# Patient Record
Sex: FEMALE | Race: WHITE | Employment: OTHER
[De-identification: names, ages, dates, MRNs, and addresses within clinical notes are randomized per-mention and may not be internally consistent; named-entity substitution may affect disease eponyms.]

---

## 2017-01-04 ENCOUNTER — OFFICE VISIT (OUTPATIENT)
Dept: FAMILY MEDICINE CLINIC | Facility: CLINIC | Age: 67
End: 2017-01-04

## 2017-01-04 VITALS
HEIGHT: 61 IN | HEART RATE: 63 BPM | RESPIRATION RATE: 18 BRPM | WEIGHT: 244.4 LBS | SYSTOLIC BLOOD PRESSURE: 127 MMHG | DIASTOLIC BLOOD PRESSURE: 69 MMHG | BODY MASS INDEX: 46.14 KG/M2

## 2017-01-04 DIAGNOSIS — R07.89 OTHER CHEST PAIN: ICD-10-CM

## 2017-01-04 DIAGNOSIS — Z00.00 PREVENTATIVE HEALTH CARE: ICD-10-CM

## 2017-01-04 DIAGNOSIS — K21.00 GASTROESOPHAGEAL REFLUX DISEASE WITH ESOPHAGITIS: Primary | ICD-10-CM

## 2017-01-04 DIAGNOSIS — M89.9 DISORDER OF BONE: ICD-10-CM

## 2017-01-04 DIAGNOSIS — E78.5 HYPERLIPIDEMIA WITH TARGET LDL LESS THAN 100: Chronic | ICD-10-CM

## 2017-01-04 PROCEDURE — 99214 OFFICE O/P EST MOD 30 MIN: CPT | Performed by: FAMILY MEDICINE

## 2017-01-04 RX ORDER — RANITIDINE 150 MG/1
150 TABLET ORAL 2 TIMES DAILY
Qty: 180 TABLET | Refills: 3 | Status: ON HOLD | OUTPATIENT
Start: 2017-01-04 | End: 2017-07-10 | Stop reason: HOSPADM

## 2017-01-04 RX ORDER — PANTOPRAZOLE SODIUM 40 MG/1
40 TABLET, DELAYED RELEASE ORAL DAILY
Qty: 90 TABLET | Refills: 3 | Status: SHIPPED | OUTPATIENT
Start: 2017-01-04 | End: 2017-03-07 | Stop reason: SDUPTHER

## 2017-01-04 RX ORDER — PHENOL 1.4 %
10 AEROSOL, SPRAY (ML) MUCOUS MEMBRANE NIGHTLY
Qty: 90 TABLET | Refills: 3 | Status: SHIPPED | OUTPATIENT
Start: 2017-01-04 | End: 2017-10-19 | Stop reason: SDUPTHER

## 2017-02-13 ENCOUNTER — HOSPITAL ENCOUNTER (OUTPATIENT)
Dept: WOMENS IMAGING | Age: 67
Discharge: HOME | End: 2017-02-13
Payer: MEDICARE

## 2017-02-17 ENCOUNTER — HOSPITAL ENCOUNTER (OUTPATIENT)
Dept: PAIN MANAGEMENT | Age: 67
Discharge: HOME | End: 2017-02-17
Payer: MEDICARE

## 2017-02-17 VITALS
SYSTOLIC BLOOD PRESSURE: 111 MMHG | OXYGEN SATURATION: 98 % | BODY MASS INDEX: 48.1 KG/M2 | TEMPERATURE: 98.1 F | HEART RATE: 69 BPM | RESPIRATION RATE: 16 BRPM | WEIGHT: 245 LBS | HEIGHT: 60 IN | DIASTOLIC BLOOD PRESSURE: 75 MMHG

## 2017-02-17 DIAGNOSIS — M47.812 OSTEOARTHRITIS OF CERVICAL SPINE, UNSPECIFIED SPINAL OSTEOARTHRITIS COMPLICATION STATUS: ICD-10-CM

## 2017-02-17 DIAGNOSIS — M54.81 BILATERAL OCCIPITAL NEURALGIA: ICD-10-CM

## 2017-02-17 DIAGNOSIS — M54.16 LUMBAR RADICULOPATHY, CHRONIC: ICD-10-CM

## 2017-02-17 DIAGNOSIS — G43.119 INTRACTABLE MIGRAINE WITH AURA WITHOUT STATUS MIGRAINOSUS: ICD-10-CM

## 2017-02-17 DIAGNOSIS — M51.37 DEGENERATION OF LUMBAR OR LUMBOSACRAL INTERVERTEBRAL DISC: ICD-10-CM

## 2017-02-17 DIAGNOSIS — M50.30 DEGENERATIVE CERVICAL DISC: ICD-10-CM

## 2017-02-17 DIAGNOSIS — Z51.81 ENCOUNTER FOR MEDICATION MONITORING: ICD-10-CM

## 2017-02-17 DIAGNOSIS — M46.1 SACROILIITIS, NOT ELSEWHERE CLASSIFIED (HCC): ICD-10-CM

## 2017-02-17 DIAGNOSIS — M47.817 LUMBOSACRAL SPONDYLOSIS WITHOUT MYELOPATHY: ICD-10-CM

## 2017-02-17 DIAGNOSIS — G89.29 ENCOUNTER FOR CHRONIC PAIN MANAGEMENT: Primary | ICD-10-CM

## 2017-02-17 DIAGNOSIS — M47.892 OTHER OSTEOARTHRITIS OF SPINE, CERVICAL REGION: ICD-10-CM

## 2017-02-17 PROCEDURE — 99214 OFFICE O/P EST MOD 30 MIN: CPT | Performed by: PAIN MEDICINE

## 2017-02-17 PROCEDURE — 99213 OFFICE O/P EST LOW 20 MIN: CPT

## 2017-02-17 RX ORDER — OXYCODONE HYDROCHLORIDE AND ACETAMINOPHEN 5; 325 MG/1; MG/1
1 TABLET ORAL EVERY 6 HOURS PRN
Qty: 100 TABLET | Refills: 0 | Status: SHIPPED | OUTPATIENT
Start: 2017-02-18 | End: 2017-03-20 | Stop reason: SDUPTHER

## 2017-02-17 ASSESSMENT — PAIN DESCRIPTION - ONSET: ONSET: ON-GOING

## 2017-02-17 ASSESSMENT — ENCOUNTER SYMPTOMS
GASTROINTESTINAL NEGATIVE: 1
SORE THROAT: 0
WHEEZING: 1
EYES NEGATIVE: 1
COUGH: 1
BACK PAIN: 1
SCALP TENDERNESS: 1
SHORTNESS OF BREATH: 1
VOMITING: 0

## 2017-02-17 ASSESSMENT — PAIN DESCRIPTION - DESCRIPTORS: DESCRIPTORS: ACHING;CONSTANT;DULL;JABBING;NAGGING;SHARP

## 2017-02-17 ASSESSMENT — PAIN DESCRIPTION - LOCATION: LOCATION: BACK;HIP;LEG

## 2017-02-17 ASSESSMENT — PAIN DESCRIPTION - ORIENTATION: ORIENTATION: RIGHT;LEFT;LOWER

## 2017-02-17 ASSESSMENT — PAIN DESCRIPTION - PROGRESSION: CLINICAL_PROGRESSION: GRADUALLY WORSENING

## 2017-02-17 ASSESSMENT — PAIN SCALES - GENERAL: PAINLEVEL_OUTOF10: 7

## 2017-02-17 ASSESSMENT — PAIN DESCRIPTION - PAIN TYPE: TYPE: CHRONIC PAIN

## 2017-02-17 ASSESSMENT — PAIN DESCRIPTION - FREQUENCY: FREQUENCY: CONTINUOUS

## 2017-02-20 ENCOUNTER — HOSPITAL ENCOUNTER (OUTPATIENT)
Dept: WOMENS IMAGING | Age: 67
Discharge: HOME | End: 2017-02-20
Payer: MEDICARE

## 2017-02-20 DIAGNOSIS — Z00.00 PREVENTATIVE HEALTH CARE: ICD-10-CM

## 2017-02-20 DIAGNOSIS — M89.9 DISORDER OF BONE: ICD-10-CM

## 2017-02-20 PROCEDURE — 77080 DXA BONE DENSITY AXIAL: CPT | Performed by: RADIOLOGY

## 2017-02-20 PROCEDURE — 77080 DXA BONE DENSITY AXIAL: CPT

## 2017-03-06 RX ORDER — UBIDECARENONE 75 MG
CAPSULE ORAL
Qty: 90 TABLET | Refills: 0 | Status: SHIPPED | OUTPATIENT
Start: 2017-03-06 | End: 2017-06-06 | Stop reason: SDUPTHER

## 2017-03-06 RX ORDER — AMLODIPINE BESYLATE 5 MG/1
TABLET ORAL
Qty: 90 TABLET | Refills: 0 | Status: SHIPPED | OUTPATIENT
Start: 2017-03-06 | End: 2017-06-19 | Stop reason: SDUPTHER

## 2017-03-06 RX ORDER — PEN NEEDLE, DIABETIC 29 G X1/2"
NEEDLE, DISPOSABLE MISCELLANEOUS
Qty: 100 EACH | Refills: 0 | OUTPATIENT
Start: 2017-03-06

## 2017-03-07 ENCOUNTER — OFFICE VISIT (OUTPATIENT)
Dept: FAMILY MEDICINE CLINIC | Facility: CLINIC | Age: 67
End: 2017-03-07

## 2017-03-07 ENCOUNTER — TELEPHONE (OUTPATIENT)
Dept: FAMILY MEDICINE CLINIC | Facility: CLINIC | Age: 67
End: 2017-03-07

## 2017-03-07 VITALS
WEIGHT: 237 LBS | HEART RATE: 69 BPM | DIASTOLIC BLOOD PRESSURE: 77 MMHG | BODY MASS INDEX: 44.75 KG/M2 | HEIGHT: 61 IN | SYSTOLIC BLOOD PRESSURE: 136 MMHG

## 2017-03-07 DIAGNOSIS — Z79.4 TYPE 2 DIABETES MELLITUS WITHOUT COMPLICATION, WITH LONG-TERM CURRENT USE OF INSULIN (HCC): Primary | ICD-10-CM

## 2017-03-07 DIAGNOSIS — I10 HTN (HYPERTENSION), BENIGN: Chronic | ICD-10-CM

## 2017-03-07 DIAGNOSIS — J44.9 CHRONIC OBSTRUCTIVE PULMONARY DISEASE, UNSPECIFIED COPD TYPE (HCC): ICD-10-CM

## 2017-03-07 DIAGNOSIS — K21.00 GASTROESOPHAGEAL REFLUX DISEASE WITH ESOPHAGITIS: ICD-10-CM

## 2017-03-07 DIAGNOSIS — E11.9 TYPE 2 DIABETES MELLITUS WITHOUT COMPLICATION, WITH LONG-TERM CURRENT USE OF INSULIN (HCC): Primary | ICD-10-CM

## 2017-03-07 PROCEDURE — 99214 OFFICE O/P EST MOD 30 MIN: CPT | Performed by: FAMILY MEDICINE

## 2017-03-07 RX ORDER — PANTOPRAZOLE SODIUM 40 MG/1
40 TABLET, DELAYED RELEASE ORAL 2 TIMES DAILY
Qty: 180 TABLET | Refills: 3 | Status: ON HOLD | OUTPATIENT
Start: 2017-03-07 | End: 2018-02-06

## 2017-03-07 ASSESSMENT — ENCOUNTER SYMPTOMS
NAUSEA: 0
SHORTNESS OF BREATH: 0
VOMITING: 0
COUGH: 0
ABDOMINAL PAIN: 0

## 2017-03-16 RX ORDER — DIPHENHYDRAMINE HCL 25 MG
CAPSULE ORAL
Qty: 30 CAPSULE | Refills: 0 | Status: ON HOLD | OUTPATIENT
Start: 2017-03-16 | End: 2018-02-01

## 2017-03-20 ENCOUNTER — HOSPITAL ENCOUNTER (OUTPATIENT)
Dept: PAIN MANAGEMENT | Age: 67
Discharge: HOME | End: 2017-03-20
Payer: MEDICARE

## 2017-03-20 DIAGNOSIS — G43.709 CHRONIC MIGRAINE WITHOUT AURA WITHOUT STATUS MIGRAINOSUS, NOT INTRACTABLE: ICD-10-CM

## 2017-03-20 DIAGNOSIS — M47.817 LUMBOSACRAL SPONDYLOSIS WITHOUT MYELOPATHY: ICD-10-CM

## 2017-03-20 DIAGNOSIS — M54.81 BILATERAL OCCIPITAL NEURALGIA: ICD-10-CM

## 2017-03-20 DIAGNOSIS — M25.562 CHRONIC PAIN OF LEFT KNEE: ICD-10-CM

## 2017-03-20 DIAGNOSIS — M51.37 DEGENERATION OF LUMBAR OR LUMBOSACRAL INTERVERTEBRAL DISC: ICD-10-CM

## 2017-03-20 DIAGNOSIS — M46.1 SACROILIITIS, NOT ELSEWHERE CLASSIFIED (HCC): ICD-10-CM

## 2017-03-20 DIAGNOSIS — G89.29 ENCOUNTER FOR CHRONIC PAIN MANAGEMENT: Primary | ICD-10-CM

## 2017-03-20 DIAGNOSIS — G89.29 CHRONIC PAIN OF LEFT KNEE: ICD-10-CM

## 2017-03-20 DIAGNOSIS — M50.30 DEGENERATIVE CERVICAL DISC: ICD-10-CM

## 2017-03-20 DIAGNOSIS — M54.16 LUMBAR RADICULOPATHY, CHRONIC: ICD-10-CM

## 2017-03-20 DIAGNOSIS — Z51.81 ENCOUNTER FOR MEDICATION MONITORING: ICD-10-CM

## 2017-03-20 DIAGNOSIS — J44.9 CHRONIC OBSTRUCTIVE PULMONARY DISEASE, UNSPECIFIED COPD TYPE (HCC): ICD-10-CM

## 2017-03-20 PROCEDURE — 99213 OFFICE O/P EST LOW 20 MIN: CPT | Performed by: NURSE PRACTITIONER

## 2017-03-20 PROCEDURE — 99213 OFFICE O/P EST LOW 20 MIN: CPT

## 2017-03-20 RX ORDER — AMOXICILLIN 500 MG/1
TABLET, FILM COATED ORAL EVERY 8 HOURS
Status: ON HOLD | COMMUNITY
End: 2017-04-10 | Stop reason: HOSPADM

## 2017-03-20 RX ORDER — OXYCODONE HYDROCHLORIDE AND ACETAMINOPHEN 5; 325 MG/1; MG/1
1 TABLET ORAL EVERY 6 HOURS PRN
Qty: 100 TABLET | Refills: 0 | Status: SHIPPED | OUTPATIENT
Start: 2017-03-20 | End: 2017-04-19 | Stop reason: SDUPTHER

## 2017-03-20 RX ORDER — TOPIRAMATE 100 MG/1
100 TABLET, FILM COATED ORAL NIGHTLY
Qty: 90 TABLET | Refills: 1 | Status: SHIPPED | OUTPATIENT
Start: 2017-03-20 | End: 2017-06-21 | Stop reason: SDUPTHER

## 2017-03-21 ENCOUNTER — OFFICE VISIT (OUTPATIENT)
Dept: PODIATRY | Age: 67
End: 2017-03-21
Payer: MEDICARE

## 2017-03-21 VITALS
DIASTOLIC BLOOD PRESSURE: 57 MMHG | HEART RATE: 77 BPM | BODY MASS INDEX: 42.33 KG/M2 | HEIGHT: 62 IN | SYSTOLIC BLOOD PRESSURE: 105 MMHG | WEIGHT: 230 LBS

## 2017-03-21 DIAGNOSIS — B35.1 ONYCHOMYCOSIS OF TOENAIL: Primary | ICD-10-CM

## 2017-03-21 DIAGNOSIS — M79.675 PAIN OF TOES OF BOTH FEET: ICD-10-CM

## 2017-03-21 DIAGNOSIS — M79.674 PAIN OF TOES OF BOTH FEET: ICD-10-CM

## 2017-03-21 DIAGNOSIS — E11.51 TYPE 2 DIABETES MELLITUS WITH PERIPHERAL VASCULAR DISEASE (HCC): ICD-10-CM

## 2017-03-21 PROCEDURE — 11721 DEBRIDE NAIL 6 OR MORE: CPT | Performed by: PODIATRIST

## 2017-03-21 PROCEDURE — 99203 OFFICE O/P NEW LOW 30 MIN: CPT | Performed by: PODIATRIST

## 2017-03-22 ASSESSMENT — ENCOUNTER SYMPTOMS
SHORTNESS OF BREATH: 0
BACK PAIN: 0
NAUSEA: 0
COLOR CHANGE: 0
DIARRHEA: 0

## 2017-04-06 ENCOUNTER — APPOINTMENT (OUTPATIENT)
Dept: GENERAL RADIOLOGY | Age: 67
DRG: 392 | End: 2017-04-06
Payer: MEDICARE

## 2017-04-06 ENCOUNTER — HOSPITAL ENCOUNTER (OUTPATIENT)
Age: 67
Setting detail: OBSERVATION
Discharge: HOME | DRG: 392 | End: 2017-04-10
Attending: EMERGENCY MEDICINE | Admitting: INTERNAL MEDICINE
Payer: MEDICARE

## 2017-04-06 ENCOUNTER — APPOINTMENT (OUTPATIENT)
Dept: CT IMAGING | Age: 67
DRG: 392 | End: 2017-04-06
Payer: MEDICARE

## 2017-04-06 DIAGNOSIS — R91.1 NODULE OF RIGHT LUNG: Primary | ICD-10-CM

## 2017-04-06 DIAGNOSIS — R10.12 LEFT UPPER QUADRANT PAIN: ICD-10-CM

## 2017-04-06 DIAGNOSIS — R19.7 DIARRHEA, UNSPECIFIED TYPE: ICD-10-CM

## 2017-04-06 DIAGNOSIS — R11.2 NAUSEA AND VOMITING, INTRACTABILITY OF VOMITING NOT SPECIFIED, UNSPECIFIED VOMITING TYPE: ICD-10-CM

## 2017-04-06 LAB
-: ABNORMAL
ABSOLUTE BANDS #: 0.54 K/UL (ref 0–1)
ABSOLUTE EOS #: 0 K/UL (ref 0–0.4)
ABSOLUTE LYMPH #: 0.97 K/UL (ref 1–4.8)
ABSOLUTE MONO #: 0.54 K/UL (ref 0.1–1.3)
ALBUMIN SERPL-MCNC: 4.7 G/DL (ref 3.5–5.2)
ALBUMIN/GLOBULIN RATIO: NORMAL (ref 1–2.5)
ALP BLD-CCNC: 69 U/L (ref 35–104)
ALT SERPL-CCNC: 17 U/L (ref 5–33)
AMORPHOUS: ABNORMAL
ANION GAP SERPL CALCULATED.3IONS-SCNC: 16 MMOL/L (ref 9–17)
AST SERPL-CCNC: 16 U/L
BACTERIA: ABNORMAL
BANDS: 5 % (ref 0–10)
BASOPHILS # BLD: 0 % (ref 0–2)
BASOPHILS ABSOLUTE: 0 K/UL (ref 0–0.2)
BILIRUB SERPL-MCNC: 0.33 MG/DL (ref 0.3–1.2)
BILIRUBIN DIRECT: 0.1 MG/DL
BILIRUBIN URINE: ABNORMAL
BILIRUBIN, INDIRECT: 0.23 MG/DL (ref 0–1)
BUN BLDV-MCNC: 17 MG/DL (ref 8–23)
BUN/CREAT BLD: ABNORMAL (ref 9–20)
CALCIUM SERPL-MCNC: 9.4 MG/DL (ref 8.6–10.4)
CASTS UA: ABNORMAL /LPF (ref 0–2)
CHLORIDE BLD-SCNC: 102 MMOL/L (ref 98–107)
CO2: 23 MMOL/L (ref 20–31)
COLOR: YELLOW
COMMENT UA: ABNORMAL
CREAT SERPL-MCNC: 0.97 MG/DL (ref 0.5–0.9)
CRYSTALS, UA: ABNORMAL /HPF
DIFFERENTIAL TYPE: ABNORMAL
EOSINOPHILS RELATIVE PERCENT: 0 % (ref 0–4)
EPITHELIAL CELLS UA: ABNORMAL /HPF (ref 0–5)
GFR AFRICAN AMERICAN: >60 ML/MIN
GFR NON-AFRICAN AMERICAN: 57 ML/MIN
GFR SERPL CREATININE-BSD FRML MDRD: ABNORMAL ML/MIN/{1.73_M2}
GFR SERPL CREATININE-BSD FRML MDRD: ABNORMAL ML/MIN/{1.73_M2}
GLOBULIN: NORMAL G/DL (ref 1.5–3.8)
GLUCOSE BLD-MCNC: 170 MG/DL (ref 70–99)
GLUCOSE URINE: NEGATIVE
HCT VFR BLD CALC: 42.7 % (ref 36–46)
HEMOGLOBIN: 13.9 G/DL (ref 12–16)
KETONES, URINE: 15
LEUKOCYTE ESTERASE, URINE: ABNORMAL
LIPASE: 39 U/L (ref 13–60)
LYMPHOCYTES # BLD: 9 % (ref 24–44)
MCH RBC QN AUTO: 29.9 PG (ref 26–34)
MCHC RBC AUTO-ENTMCNC: 32.5 G/DL (ref 31–37)
MCV RBC AUTO: 91.9 FL (ref 80–100)
MONOCYTES # BLD: 5 % (ref 1–7)
MORPHOLOGY: NORMAL
MUCUS: ABNORMAL
NITRITE, URINE: NEGATIVE
OTHER OBSERVATIONS UA: ABNORMAL
PDW BLD-RTO: 14.3 % (ref 11.5–14.9)
PH UA: 5 (ref 5–8)
PLATELET # BLD: 170 K/UL (ref 150–450)
PLATELET ESTIMATE: ABNORMAL
PMV BLD AUTO: 9.8 FL (ref 6–12)
POTASSIUM SERPL-SCNC: 3.9 MMOL/L (ref 3.7–5.3)
PROTEIN UA: 100
RBC # BLD: 4.65 M/UL (ref 4–5.2)
RBC # BLD: ABNORMAL 10*6/UL
RBC UA: ABNORMAL /HPF
RENAL EPITHELIAL, UA: ABNORMAL /HPF
SEG NEUTROPHILS: 81 % (ref 36–66)
SEGMENTED NEUTROPHILS ABSOLUTE COUNT: 8.75 K/UL (ref 1.3–9.1)
SODIUM BLD-SCNC: 141 MMOL/L (ref 135–144)
SPECIFIC GRAVITY UA: 1.03 (ref 1–1.03)
TOTAL PROTEIN: 8 G/DL (ref 6.4–8.3)
TRICHOMONAS: ABNORMAL
TROPONIN INTERP: NORMAL
TROPONIN T: <0.03 NG/ML
TURBIDITY: ABNORMAL
URINE HGB: ABNORMAL
UROBILINOGEN, URINE: NORMAL
WBC # BLD: 10.8 K/UL (ref 3.5–11)
WBC # BLD: ABNORMAL 10*3/UL
WBC UA: ABNORMAL /HPF
YEAST: ABNORMAL

## 2017-04-06 PROCEDURE — 87493 C DIFF AMPLIFIED PROBE: CPT

## 2017-04-06 PROCEDURE — 2580000003 HC RX 258: Performed by: EMERGENCY MEDICINE

## 2017-04-06 PROCEDURE — 80048 BASIC METABOLIC PNL TOTAL CA: CPT

## 2017-04-06 PROCEDURE — 99223 1ST HOSP IP/OBS HIGH 75: CPT | Performed by: INTERNAL MEDICINE

## 2017-04-06 PROCEDURE — 81001 URINALYSIS AUTO W/SCOPE: CPT

## 2017-04-06 PROCEDURE — 84484 ASSAY OF TROPONIN QUANT: CPT

## 2017-04-06 PROCEDURE — 83690 ASSAY OF LIPASE: CPT

## 2017-04-06 PROCEDURE — G0378 HOSPITAL OBSERVATION PER HR: HCPCS

## 2017-04-06 PROCEDURE — 71020 XR CHEST STANDARD TWO VW: CPT

## 2017-04-06 PROCEDURE — 85025 COMPLETE CBC W/AUTO DIFF WBC: CPT

## 2017-04-06 PROCEDURE — 6360000002 HC RX W HCPCS: Performed by: EMERGENCY MEDICINE

## 2017-04-06 PROCEDURE — 93005 ELECTROCARDIOGRAM TRACING: CPT

## 2017-04-06 PROCEDURE — 6360000002 HC RX W HCPCS: Performed by: INTERNAL MEDICINE

## 2017-04-06 PROCEDURE — 87077 CULTURE AEROBIC IDENTIFY: CPT

## 2017-04-06 PROCEDURE — 99285 EMERGENCY DEPT VISIT HI MDM: CPT

## 2017-04-06 PROCEDURE — 96374 THER/PROPH/DIAG INJ IV PUSH: CPT

## 2017-04-06 PROCEDURE — 87086 URINE CULTURE/COLONY COUNT: CPT

## 2017-04-06 PROCEDURE — 74176 CT ABD & PELVIS W/O CONTRAST: CPT

## 2017-04-06 PROCEDURE — 36415 COLL VENOUS BLD VENIPUNCTURE: CPT

## 2017-04-06 PROCEDURE — 96375 TX/PRO/DX INJ NEW DRUG ADDON: CPT

## 2017-04-06 PROCEDURE — 87186 SC STD MICRODIL/AGAR DIL: CPT

## 2017-04-06 PROCEDURE — 80076 HEPATIC FUNCTION PANEL: CPT

## 2017-04-06 RX ORDER — CLOPIDOGREL BISULFATE 75 MG/1
75 TABLET ORAL DAILY
Status: DISCONTINUED | OUTPATIENT
Start: 2017-04-07 | End: 2017-04-10 | Stop reason: HOSPADM

## 2017-04-06 RX ORDER — FERROUS SULFATE 325(65) MG
325 TABLET ORAL
Status: DISCONTINUED | OUTPATIENT
Start: 2017-04-07 | End: 2017-04-10 | Stop reason: HOSPADM

## 2017-04-06 RX ORDER — SODIUM CHLORIDE AND POTASSIUM CHLORIDE .9; .15 G/100ML; G/100ML
SOLUTION INTRAVENOUS CONTINUOUS
Status: DISCONTINUED | OUTPATIENT
Start: 2017-04-06 | End: 2017-04-10

## 2017-04-06 RX ORDER — CARVEDILOL 3.12 MG/1
3.12 TABLET ORAL 2 TIMES DAILY
Status: DISCONTINUED | OUTPATIENT
Start: 2017-04-07 | End: 2017-04-10 | Stop reason: HOSPADM

## 2017-04-06 RX ORDER — PANTOPRAZOLE SODIUM 40 MG/1
40 TABLET, DELAYED RELEASE ORAL 2 TIMES DAILY
Status: DISCONTINUED | OUTPATIENT
Start: 2017-04-07 | End: 2017-04-10 | Stop reason: HOSPADM

## 2017-04-06 RX ORDER — ATORVASTATIN CALCIUM 80 MG/1
80 TABLET, FILM COATED ORAL NIGHTLY
Status: DISCONTINUED | OUTPATIENT
Start: 2017-04-07 | End: 2017-04-10 | Stop reason: HOSPADM

## 2017-04-06 RX ORDER — TOPIRAMATE 100 MG/1
100 TABLET, FILM COATED ORAL NIGHTLY
Status: DISCONTINUED | OUTPATIENT
Start: 2017-04-07 | End: 2017-04-10 | Stop reason: HOSPADM

## 2017-04-06 RX ORDER — MECOBALAMIN 5000 MCG
10 TABLET,DISINTEGRATING ORAL NIGHTLY
Status: DISCONTINUED | OUTPATIENT
Start: 2017-04-07 | End: 2017-04-10 | Stop reason: HOSPADM

## 2017-04-06 RX ORDER — UBIDECARENONE 75 MG
100 CAPSULE ORAL DAILY
Status: DISCONTINUED | OUTPATIENT
Start: 2017-04-07 | End: 2017-04-10 | Stop reason: HOSPADM

## 2017-04-06 RX ORDER — SODIUM CHLORIDE 0.9 % (FLUSH) 0.9 %
10 SYRINGE (ML) INJECTION PRN
Status: DISCONTINUED | OUTPATIENT
Start: 2017-04-06 | End: 2017-04-10 | Stop reason: HOSPADM

## 2017-04-06 RX ORDER — ISOSORBIDE MONONITRATE 30 MG/1
30 TABLET, EXTENDED RELEASE ORAL DAILY
Status: DISCONTINUED | OUTPATIENT
Start: 2017-04-07 | End: 2017-04-10 | Stop reason: HOSPADM

## 2017-04-06 RX ORDER — AMLODIPINE BESYLATE 5 MG/1
5 TABLET ORAL DAILY
Status: DISCONTINUED | OUTPATIENT
Start: 2017-04-07 | End: 2017-04-10 | Stop reason: HOSPADM

## 2017-04-06 RX ORDER — SODIUM CHLORIDE 0.9 % (FLUSH) 0.9 %
10 SYRINGE (ML) INJECTION EVERY 12 HOURS SCHEDULED
Status: DISCONTINUED | OUTPATIENT
Start: 2017-04-06 | End: 2017-04-10 | Stop reason: HOSPADM

## 2017-04-06 RX ORDER — MAGNESIUM OXIDE 400 MG/1
400 TABLET ORAL 2 TIMES DAILY
Status: DISCONTINUED | OUTPATIENT
Start: 2017-04-07 | End: 2017-04-07 | Stop reason: CLARIF

## 2017-04-06 RX ORDER — OXYCODONE HYDROCHLORIDE AND ACETAMINOPHEN 5; 325 MG/1; MG/1
1 TABLET ORAL EVERY 6 HOURS PRN
Status: DISCONTINUED | OUTPATIENT
Start: 2017-04-06 | End: 2017-04-10 | Stop reason: HOSPADM

## 2017-04-06 RX ORDER — FUROSEMIDE 20 MG/1
20 TABLET ORAL PRN
Status: DISCONTINUED | OUTPATIENT
Start: 2017-04-06 | End: 2017-04-10 | Stop reason: HOSPADM

## 2017-04-06 RX ORDER — 0.9 % SODIUM CHLORIDE 0.9 %
1000 INTRAVENOUS SOLUTION INTRAVENOUS ONCE
Status: COMPLETED | OUTPATIENT
Start: 2017-04-06 | End: 2017-04-06

## 2017-04-06 RX ORDER — CITALOPRAM 20 MG/1
20 TABLET ORAL 2 TIMES DAILY
Status: DISCONTINUED | OUTPATIENT
Start: 2017-04-07 | End: 2017-04-10 | Stop reason: HOSPADM

## 2017-04-06 RX ORDER — FAMOTIDINE 20 MG/1
20 TABLET, FILM COATED ORAL DAILY
Status: DISCONTINUED | OUTPATIENT
Start: 2017-04-07 | End: 2017-04-10 | Stop reason: HOSPADM

## 2017-04-06 RX ORDER — LOSARTAN POTASSIUM 25 MG/1
25 TABLET ORAL DAILY
Status: DISCONTINUED | OUTPATIENT
Start: 2017-04-07 | End: 2017-04-10 | Stop reason: HOSPADM

## 2017-04-06 RX ORDER — DIPHENHYDRAMINE HCL 25 MG
25 TABLET ORAL EVERY 6 HOURS PRN
Status: DISCONTINUED | OUTPATIENT
Start: 2017-04-06 | End: 2017-04-10 | Stop reason: HOSPADM

## 2017-04-06 RX ORDER — FENTANYL CITRATE 50 UG/ML
100 INJECTION, SOLUTION INTRAMUSCULAR; INTRAVENOUS ONCE
Status: COMPLETED | OUTPATIENT
Start: 2017-04-06 | End: 2017-04-06

## 2017-04-06 RX ORDER — ALBUTEROL SULFATE 2.5 MG/3ML
2.5 SOLUTION RESPIRATORY (INHALATION) EVERY 6 HOURS PRN
Status: DISCONTINUED | OUTPATIENT
Start: 2017-04-06 | End: 2017-04-10 | Stop reason: HOSPADM

## 2017-04-06 RX ORDER — ONDANSETRON 2 MG/ML
4 INJECTION INTRAMUSCULAR; INTRAVENOUS ONCE
Status: COMPLETED | OUTPATIENT
Start: 2017-04-06 | End: 2017-04-06

## 2017-04-06 RX ORDER — DOCUSATE SODIUM 100 MG/1
100 CAPSULE, LIQUID FILLED ORAL 2 TIMES DAILY
Status: DISCONTINUED | OUTPATIENT
Start: 2017-04-07 | End: 2017-04-10 | Stop reason: HOSPADM

## 2017-04-06 RX ADMIN — PIPERACILLIN SODIUM,TAZOBACTAM SODIUM 3.38 G: 3; .375 INJECTION, POWDER, FOR SOLUTION INTRAVENOUS at 21:52

## 2017-04-06 RX ADMIN — ONDANSETRON 4 MG: 2 INJECTION INTRAMUSCULAR; INTRAVENOUS at 20:36

## 2017-04-06 RX ADMIN — FENTANYL CITRATE 100 MCG: 50 INJECTION INTRAMUSCULAR; INTRAVENOUS at 20:36

## 2017-04-06 RX ADMIN — SODIUM CHLORIDE 1000 ML: 9 INJECTION, SOLUTION INTRAVENOUS at 20:24

## 2017-04-06 RX ADMIN — SODIUM CHLORIDE AND POTASSIUM CHLORIDE: 9; 1.49 INJECTION, SOLUTION INTRAVENOUS at 23:33

## 2017-04-06 ASSESSMENT — PAIN SCALES - GENERAL
PAINLEVEL_OUTOF10: 8
PAINLEVEL_OUTOF10: 10
PAINLEVEL_OUTOF10: 8
PAINLEVEL_OUTOF10: 0

## 2017-04-06 ASSESSMENT — ENCOUNTER SYMPTOMS
NAUSEA: 1
SHORTNESS OF BREATH: 0
VOMITING: 1
ABDOMINAL PAIN: 1
PHOTOPHOBIA: 0
COUGH: 0
SORE THROAT: 0
DIARRHEA: 1
RHINORRHEA: 0
EYE PAIN: 0
SINUS PRESSURE: 1
BLOOD IN STOOL: 0

## 2017-04-06 ASSESSMENT — PAIN DESCRIPTION - LOCATION: LOCATION: ABDOMEN;CHEST

## 2017-04-06 ASSESSMENT — PAIN DESCRIPTION - PAIN TYPE: TYPE: ACUTE PAIN

## 2017-04-07 PROBLEM — K52.9 COLITIS: Status: ACTIVE | Noted: 2017-04-07

## 2017-04-07 PROBLEM — R11.2 NAUSEA & VOMITING: Status: ACTIVE | Noted: 2017-04-07

## 2017-04-07 PROBLEM — N30.00 ACUTE CYSTITIS: Status: ACTIVE | Noted: 2017-04-07

## 2017-04-07 PROBLEM — R91.1 PULMONARY NODULE: Status: ACTIVE | Noted: 2017-04-07

## 2017-04-07 PROBLEM — K57.90 DIVERTICULOSIS: Status: ACTIVE | Noted: 2017-04-07

## 2017-04-07 LAB
ABSOLUTE EOS #: 0.1 K/UL (ref 0–0.4)
ABSOLUTE LYMPH #: 1.7 K/UL (ref 1–4.8)
ABSOLUTE MONO #: 0.6 K/UL (ref 0.1–1.3)
ALBUMIN SERPL-MCNC: 3.7 G/DL (ref 3.5–5.2)
ALBUMIN/GLOBULIN RATIO: ABNORMAL (ref 1–2.5)
ALP BLD-CCNC: 49 U/L (ref 35–104)
ALT SERPL-CCNC: 12 U/L (ref 5–33)
ANION GAP SERPL CALCULATED.3IONS-SCNC: 10 MMOL/L (ref 9–17)
AST SERPL-CCNC: 13 U/L
BASOPHILS # BLD: 0 % (ref 0–2)
BASOPHILS ABSOLUTE: 0 K/UL (ref 0–0.2)
BILIRUB SERPL-MCNC: 0.25 MG/DL (ref 0.3–1.2)
BUN BLDV-MCNC: 13 MG/DL (ref 8–23)
BUN/CREAT BLD: ABNORMAL (ref 9–20)
C DIFFICILE TOXINS, PCR: NORMAL
CALCIUM SERPL-MCNC: 8.1 MG/DL (ref 8.6–10.4)
CAMPYLOBACTER PCR: NORMAL
CHLORIDE BLD-SCNC: 103 MMOL/L (ref 98–107)
CO2: 24 MMOL/L (ref 20–31)
CREAT SERPL-MCNC: 0.99 MG/DL (ref 0.5–0.9)
DIFFERENTIAL TYPE: ABNORMAL
EOSINOPHILS RELATIVE PERCENT: 2 % (ref 0–4)
GFR AFRICAN AMERICAN: >60 ML/MIN
GFR NON-AFRICAN AMERICAN: 56 ML/MIN
GFR SERPL CREATININE-BSD FRML MDRD: ABNORMAL ML/MIN/{1.73_M2}
GFR SERPL CREATININE-BSD FRML MDRD: ABNORMAL ML/MIN/{1.73_M2}
GLUCOSE BLD-MCNC: 105 MG/DL (ref 65–105)
GLUCOSE BLD-MCNC: 137 MG/DL (ref 65–105)
GLUCOSE BLD-MCNC: 72 MG/DL (ref 65–105)
GLUCOSE BLD-MCNC: 91 MG/DL (ref 70–99)
HCT VFR BLD CALC: 34.2 % (ref 36–46)
HEMOGLOBIN: 11.1 G/DL (ref 12–16)
LYMPHOCYTES # BLD: 22 % (ref 24–44)
MAGNESIUM: 1.4 MG/DL (ref 1.6–2.6)
MCH RBC QN AUTO: 30.2 PG (ref 26–34)
MCHC RBC AUTO-ENTMCNC: 32.6 G/DL (ref 31–37)
MCV RBC AUTO: 92.6 FL (ref 80–100)
MONOCYTES # BLD: 7 % (ref 1–7)
PDW BLD-RTO: 14.7 % (ref 11.5–14.9)
PLATELET # BLD: 157 K/UL (ref 150–450)
PLATELET ESTIMATE: ABNORMAL
PMV BLD AUTO: 9.6 FL (ref 6–12)
POTASSIUM SERPL-SCNC: 4.3 MMOL/L (ref 3.7–5.3)
RBC # BLD: 3.7 M/UL (ref 4–5.2)
RBC # BLD: ABNORMAL 10*6/UL
SALMONELLA PCR: NORMAL
SEG NEUTROPHILS: 69 % (ref 36–66)
SEGMENTED NEUTROPHILS ABSOLUTE COUNT: 5.1 K/UL (ref 1.3–9.1)
SHIGATOXIN GENE PCR: NORMAL
SHIGELLA SP PCR: NORMAL
SODIUM BLD-SCNC: 137 MMOL/L (ref 135–144)
SPECIMEN DESCRIPTION: NORMAL
SPECIMEN: NORMAL
TOTAL PROTEIN: 6.4 G/DL (ref 6.4–8.3)
WBC # BLD: 7.5 K/UL (ref 3.5–11)
WBC # BLD: ABNORMAL 10*3/UL

## 2017-04-07 PROCEDURE — 99222 1ST HOSP IP/OBS MODERATE 55: CPT | Performed by: INTERNAL MEDICINE

## 2017-04-07 PROCEDURE — 6370000000 HC RX 637 (ALT 250 FOR IP): Performed by: NURSE PRACTITIONER

## 2017-04-07 PROCEDURE — 94760 N-INVAS EAR/PLS OXIMETRY 1: CPT

## 2017-04-07 PROCEDURE — 99232 SBSQ HOSP IP/OBS MODERATE 35: CPT | Performed by: INTERNAL MEDICINE

## 2017-04-07 PROCEDURE — 83735 ASSAY OF MAGNESIUM: CPT

## 2017-04-07 PROCEDURE — 80053 COMPREHEN METABOLIC PANEL: CPT

## 2017-04-07 PROCEDURE — 82947 ASSAY GLUCOSE BLOOD QUANT: CPT

## 2017-04-07 PROCEDURE — 2500000003 HC RX 250 WO HCPCS: Performed by: NURSE PRACTITIONER

## 2017-04-07 PROCEDURE — 6360000002 HC RX W HCPCS: Performed by: INTERNAL MEDICINE

## 2017-04-07 PROCEDURE — 83630 LACTOFERRIN FECAL (QUAL): CPT

## 2017-04-07 PROCEDURE — 6360000002 HC RX W HCPCS: Performed by: NURSE PRACTITIONER

## 2017-04-07 PROCEDURE — 2580000003 HC RX 258: Performed by: INTERNAL MEDICINE

## 2017-04-07 PROCEDURE — 87205 SMEAR GRAM STAIN: CPT

## 2017-04-07 PROCEDURE — G0378 HOSPITAL OBSERVATION PER HR: HCPCS

## 2017-04-07 PROCEDURE — 85025 COMPLETE CBC W/AUTO DIFF WBC: CPT

## 2017-04-07 PROCEDURE — 1200000000 HC SEMI PRIVATE

## 2017-04-07 PROCEDURE — 87329 GIARDIA AG IA: CPT

## 2017-04-07 PROCEDURE — 87505 NFCT AGENT DETECTION GI: CPT

## 2017-04-07 PROCEDURE — 36415 COLL VENOUS BLD VENIPUNCTURE: CPT

## 2017-04-07 PROCEDURE — 82272 OCCULT BLD FECES 1-3 TESTS: CPT

## 2017-04-07 RX ORDER — INSULIN GLARGINE 100 [IU]/ML
50 INJECTION, SOLUTION SUBCUTANEOUS EVERY MORNING
Status: DISCONTINUED | OUTPATIENT
Start: 2017-04-07 | End: 2017-04-10 | Stop reason: HOSPADM

## 2017-04-07 RX ORDER — DEXTROSE MONOHYDRATE 25 G/50ML
12.5 INJECTION, SOLUTION INTRAVENOUS PRN
Status: DISCONTINUED | OUTPATIENT
Start: 2017-04-07 | End: 2017-04-10 | Stop reason: HOSPADM

## 2017-04-07 RX ORDER — BISACODYL 10 MG
10 SUPPOSITORY, RECTAL RECTAL DAILY PRN
Status: DISCONTINUED | OUTPATIENT
Start: 2017-04-07 | End: 2017-04-10 | Stop reason: HOSPADM

## 2017-04-07 RX ORDER — POTASSIUM CHLORIDE 7.45 MG/ML
10 INJECTION INTRAVENOUS PRN
Status: DISCONTINUED | OUTPATIENT
Start: 2017-04-07 | End: 2017-04-10 | Stop reason: HOSPADM

## 2017-04-07 RX ORDER — INSULIN GLARGINE 100 [IU]/ML
40 INJECTION, SOLUTION SUBCUTANEOUS NIGHTLY
Status: DISCONTINUED | OUTPATIENT
Start: 2017-04-07 | End: 2017-04-10 | Stop reason: HOSPADM

## 2017-04-07 RX ORDER — POTASSIUM CHLORIDE 20 MEQ/1
40 TABLET, EXTENDED RELEASE ORAL PRN
Status: DISCONTINUED | OUTPATIENT
Start: 2017-04-07 | End: 2017-04-10 | Stop reason: HOSPADM

## 2017-04-07 RX ORDER — DEXTROSE MONOHYDRATE 50 MG/ML
100 INJECTION, SOLUTION INTRAVENOUS PRN
Status: DISCONTINUED | OUTPATIENT
Start: 2017-04-07 | End: 2017-04-10 | Stop reason: HOSPADM

## 2017-04-07 RX ORDER — NICOTINE POLACRILEX 4 MG
15 LOZENGE BUCCAL PRN
Status: DISCONTINUED | OUTPATIENT
Start: 2017-04-07 | End: 2017-04-10 | Stop reason: HOSPADM

## 2017-04-07 RX ORDER — POTASSIUM CHLORIDE 20MEQ/15ML
40 LIQUID (ML) ORAL PRN
Status: DISCONTINUED | OUTPATIENT
Start: 2017-04-07 | End: 2017-04-10 | Stop reason: HOSPADM

## 2017-04-07 RX ADMIN — OXYCODONE HYDROCHLORIDE AND ACETAMINOPHEN 1 TABLET: 5; 325 TABLET ORAL at 14:31

## 2017-04-07 RX ADMIN — Medication 400 MG: at 22:11

## 2017-04-07 RX ADMIN — PANTOPRAZOLE SODIUM 40 MG: 40 TABLET, DELAYED RELEASE ORAL at 09:05

## 2017-04-07 RX ADMIN — VITAMIN B12 0.1 MG ORAL TABLET 100 MCG: 0.1 TABLET ORAL at 09:05

## 2017-04-07 RX ADMIN — Medication 325 MG: at 09:05

## 2017-04-07 RX ADMIN — MOMETASONE FUROATE AND FORMOTEROL FUMARATE DIHYDRATE 2 PUFF: 200; 5 AEROSOL RESPIRATORY (INHALATION) at 01:38

## 2017-04-07 RX ADMIN — PANTOPRAZOLE SODIUM 40 MG: 40 TABLET, DELAYED RELEASE ORAL at 17:19

## 2017-04-07 RX ADMIN — PIPERACILLIN SODIUM,TAZOBACTAM SODIUM 3.38 G: 3; .375 INJECTION, POWDER, FOR SOLUTION INTRAVENOUS at 22:10

## 2017-04-07 RX ADMIN — Medication 400 MG: at 09:05

## 2017-04-07 RX ADMIN — CLOPIDOGREL BISULFATE 75 MG: 75 TABLET ORAL at 09:05

## 2017-04-07 RX ADMIN — OXYCODONE HYDROCHLORIDE AND ACETAMINOPHEN 1 TABLET: 5; 325 TABLET ORAL at 04:10

## 2017-04-07 RX ADMIN — Medication 50 UNITS: at 09:06

## 2017-04-07 RX ADMIN — MOMETASONE FUROATE AND FORMOTEROL FUMARATE DIHYDRATE 2 PUFF: 200; 5 AEROSOL RESPIRATORY (INHALATION) at 09:05

## 2017-04-07 RX ADMIN — MOMETASONE FUROATE AND FORMOTEROL FUMARATE DIHYDRATE 2 PUFF: 200; 5 AEROSOL RESPIRATORY (INHALATION) at 22:12

## 2017-04-07 RX ADMIN — CARVEDILOL 3.12 MG: 3.12 TABLET, FILM COATED ORAL at 22:11

## 2017-04-07 RX ADMIN — ISOSORBIDE MONONITRATE 30 MG: 30 TABLET, EXTENDED RELEASE ORAL at 09:05

## 2017-04-07 RX ADMIN — Medication 10 MG: at 22:10

## 2017-04-07 RX ADMIN — CARVEDILOL 3.12 MG: 3.12 TABLET, FILM COATED ORAL at 09:05

## 2017-04-07 RX ADMIN — TOPIRAMATE 100 MG: 100 TABLET, FILM COATED ORAL at 22:10

## 2017-04-07 RX ADMIN — ENOXAPARIN SODIUM 40 MG: 40 INJECTION SUBCUTANEOUS at 09:06

## 2017-04-07 RX ADMIN — DOCUSATE SODIUM 100 MG: 100 CAPSULE, LIQUID FILLED ORAL at 00:21

## 2017-04-07 RX ADMIN — PIPERACILLIN SODIUM,TAZOBACTAM SODIUM 3.38 G: 3; .375 INJECTION, POWDER, FOR SOLUTION INTRAVENOUS at 03:59

## 2017-04-07 RX ADMIN — Medication 10 MG: at 01:17

## 2017-04-07 RX ADMIN — CITALOPRAM HYDROBROMIDE 20 MG: 20 TABLET ORAL at 00:21

## 2017-04-07 RX ADMIN — MICONAZOLE NITRATE: 1.42 POWDER TOPICAL at 00:16

## 2017-04-07 RX ADMIN — LOSARTAN POTASSIUM 25 MG: 25 TABLET ORAL at 14:36

## 2017-04-07 RX ADMIN — FAMOTIDINE 20 MG: 20 TABLET, FILM COATED ORAL at 09:05

## 2017-04-07 RX ADMIN — CITALOPRAM HYDROBROMIDE 20 MG: 20 TABLET ORAL at 22:11

## 2017-04-07 RX ADMIN — OXYCODONE HYDROCHLORIDE AND ACETAMINOPHEN 1 TABLET: 5; 325 TABLET ORAL at 22:11

## 2017-04-07 RX ADMIN — METFORMIN HYDROCHLORIDE 1000 MG: 1000 TABLET, FILM COATED ORAL at 09:05

## 2017-04-07 RX ADMIN — PIPERACILLIN SODIUM,TAZOBACTAM SODIUM 3.38 G: 3; .375 INJECTION, POWDER, FOR SOLUTION INTRAVENOUS at 14:35

## 2017-04-07 RX ADMIN — TOPIRAMATE 100 MG: 100 TABLET, FILM COATED ORAL at 01:16

## 2017-04-07 RX ADMIN — CITALOPRAM HYDROBROMIDE 20 MG: 20 TABLET ORAL at 11:26

## 2017-04-07 RX ADMIN — ATORVASTATIN CALCIUM 80 MG: 80 TABLET, FILM COATED ORAL at 22:11

## 2017-04-07 RX ADMIN — DIPHENHYDRAMINE HCL 25 MG: 25 TABLET ORAL at 00:22

## 2017-04-07 RX ADMIN — MICONAZOLE NITRATE: 1.42 POWDER TOPICAL at 09:05

## 2017-04-07 RX ADMIN — METFORMIN HYDROCHLORIDE 1000 MG: 1000 TABLET, FILM COATED ORAL at 17:19

## 2017-04-07 RX ADMIN — SODIUM CHLORIDE AND POTASSIUM CHLORIDE: 9; 1.49 INJECTION, SOLUTION INTRAVENOUS at 08:12

## 2017-04-07 RX ADMIN — AMLODIPINE BESYLATE 5 MG: 5 TABLET ORAL at 09:05

## 2017-04-07 ASSESSMENT — PAIN SCALES - GENERAL
PAINLEVEL_OUTOF10: 0
PAINLEVEL_OUTOF10: 6
PAINLEVEL_OUTOF10: 3
PAINLEVEL_OUTOF10: 9
PAINLEVEL_OUTOF10: 6
PAINLEVEL_OUTOF10: 8

## 2017-04-07 ASSESSMENT — ENCOUNTER SYMPTOMS
DIARRHEA: 1
SPUTUM PRODUCTION: 0
VOMITING: 1
DOUBLE VISION: 0
COUGH: 0
CONSTIPATION: 0
SORE THROAT: 0
ABDOMINAL PAIN: 1
BLURRED VISION: 0
BACK PAIN: 0
NAUSEA: 1
SHORTNESS OF BREATH: 0
WHEEZING: 0

## 2017-04-08 ENCOUNTER — APPOINTMENT (OUTPATIENT)
Dept: GENERAL RADIOLOGY | Age: 67
DRG: 392 | End: 2017-04-08
Payer: MEDICARE

## 2017-04-08 LAB
ABSOLUTE EOS #: 0.2 K/UL (ref 0–0.4)
ABSOLUTE LYMPH #: 2.5 K/UL (ref 1–4.8)
ABSOLUTE MONO #: 0.8 K/UL (ref 0.1–1.3)
ALBUMIN SERPL-MCNC: 3.5 G/DL (ref 3.5–5.2)
ALBUMIN/GLOBULIN RATIO: ABNORMAL (ref 1–2.5)
ALP BLD-CCNC: 52 U/L (ref 35–104)
ALT SERPL-CCNC: 12 U/L (ref 5–33)
ANION GAP SERPL CALCULATED.3IONS-SCNC: 12 MMOL/L (ref 9–17)
AST SERPL-CCNC: 12 U/L
BASOPHILS # BLD: 1 % (ref 0–2)
BASOPHILS ABSOLUTE: 0 K/UL (ref 0–0.2)
BILIRUB SERPL-MCNC: 0.26 MG/DL (ref 0.3–1.2)
BUN BLDV-MCNC: 11 MG/DL (ref 8–23)
BUN/CREAT BLD: ABNORMAL (ref 9–20)
CALCIUM SERPL-MCNC: 8.4 MG/DL (ref 8.6–10.4)
CHLORIDE BLD-SCNC: 105 MMOL/L (ref 98–107)
CO2: 22 MMOL/L (ref 20–31)
CREAT SERPL-MCNC: 0.93 MG/DL (ref 0.5–0.9)
CULTURE: ABNORMAL
CULTURE: ABNORMAL
DIFFERENTIAL TYPE: ABNORMAL
EOSINOPHILS RELATIVE PERCENT: 3 % (ref 0–4)
GFR AFRICAN AMERICAN: >60 ML/MIN
GFR NON-AFRICAN AMERICAN: >60 ML/MIN
GFR SERPL CREATININE-BSD FRML MDRD: ABNORMAL ML/MIN/{1.73_M2}
GFR SERPL CREATININE-BSD FRML MDRD: ABNORMAL ML/MIN/{1.73_M2}
GLUCOSE BLD-MCNC: 136 MG/DL (ref 65–105)
GLUCOSE BLD-MCNC: 148 MG/DL (ref 65–105)
GLUCOSE BLD-MCNC: 177 MG/DL (ref 70–99)
GLUCOSE BLD-MCNC: 188 MG/DL (ref 65–105)
HCT VFR BLD CALC: 35.1 % (ref 36–46)
HEMOGLOBIN: 11.3 G/DL (ref 12–16)
LYMPHOCYTES # BLD: 33 % (ref 24–44)
Lab: ABNORMAL
MCH RBC QN AUTO: 30.2 PG (ref 26–34)
MCHC RBC AUTO-ENTMCNC: 32.2 G/DL (ref 31–37)
MCV RBC AUTO: 93.8 FL (ref 80–100)
MONOCYTES # BLD: 10 % (ref 1–7)
ORGANISM: ABNORMAL
PDW BLD-RTO: 14.3 % (ref 11.5–14.9)
PLATELET # BLD: 153 K/UL (ref 150–450)
PLATELET ESTIMATE: ABNORMAL
PMV BLD AUTO: 9.9 FL (ref 6–12)
POTASSIUM SERPL-SCNC: 4.5 MMOL/L (ref 3.7–5.3)
RBC # BLD: 3.75 M/UL (ref 4–5.2)
RBC # BLD: ABNORMAL 10*6/UL
SEG NEUTROPHILS: 53 % (ref 36–66)
SEGMENTED NEUTROPHILS ABSOLUTE COUNT: 4.1 K/UL (ref 1.3–9.1)
SODIUM BLD-SCNC: 139 MMOL/L (ref 135–144)
SPECIMEN DESCRIPTION: ABNORMAL
SPECIMEN DESCRIPTION: ABNORMAL
STATUS: ABNORMAL
TOTAL PROTEIN: 6.3 G/DL (ref 6.4–8.3)
WBC # BLD: 7.8 K/UL (ref 3.5–11)
WBC # BLD: ABNORMAL 10*3/UL

## 2017-04-08 PROCEDURE — 2580000003 HC RX 258: Performed by: INTERNAL MEDICINE

## 2017-04-08 PROCEDURE — 74000 XR ABDOMEN LIMITED (KUB): CPT

## 2017-04-08 PROCEDURE — 6360000002 HC RX W HCPCS: Performed by: NURSE PRACTITIONER

## 2017-04-08 PROCEDURE — 80053 COMPREHEN METABOLIC PANEL: CPT

## 2017-04-08 PROCEDURE — 85025 COMPLETE CBC W/AUTO DIFF WBC: CPT

## 2017-04-08 PROCEDURE — 6360000002 HC RX W HCPCS: Performed by: INTERNAL MEDICINE

## 2017-04-08 PROCEDURE — 6370000000 HC RX 637 (ALT 250 FOR IP): Performed by: NURSE PRACTITIONER

## 2017-04-08 PROCEDURE — 36415 COLL VENOUS BLD VENIPUNCTURE: CPT

## 2017-04-08 PROCEDURE — 82947 ASSAY GLUCOSE BLOOD QUANT: CPT

## 2017-04-08 PROCEDURE — 1200000000 HC SEMI PRIVATE

## 2017-04-08 PROCEDURE — 99232 SBSQ HOSP IP/OBS MODERATE 35: CPT | Performed by: INTERNAL MEDICINE

## 2017-04-08 PROCEDURE — G0378 HOSPITAL OBSERVATION PER HR: HCPCS

## 2017-04-08 RX ORDER — ONDANSETRON 2 MG/ML
4 INJECTION INTRAMUSCULAR; INTRAVENOUS EVERY 6 HOURS PRN
Status: DISCONTINUED | OUTPATIENT
Start: 2017-04-08 | End: 2017-04-10 | Stop reason: HOSPADM

## 2017-04-08 RX ADMIN — ISOSORBIDE MONONITRATE 30 MG: 30 TABLET, EXTENDED RELEASE ORAL at 07:44

## 2017-04-08 RX ADMIN — SODIUM CHLORIDE AND POTASSIUM CHLORIDE: 9; 1.49 INJECTION, SOLUTION INTRAVENOUS at 22:35

## 2017-04-08 RX ADMIN — ATORVASTATIN CALCIUM 80 MG: 80 TABLET, FILM COATED ORAL at 20:31

## 2017-04-08 RX ADMIN — Medication 50 UNITS: at 12:47

## 2017-04-08 RX ADMIN — Medication 10 MG: at 20:30

## 2017-04-08 RX ADMIN — Medication 400 MG: at 07:44

## 2017-04-08 RX ADMIN — OXYCODONE HYDROCHLORIDE AND ACETAMINOPHEN 1 TABLET: 5; 325 TABLET ORAL at 12:47

## 2017-04-08 RX ADMIN — INSULIN LISPRO 2 UNITS: 100 INJECTION, SOLUTION INTRAVENOUS; SUBCUTANEOUS at 17:06

## 2017-04-08 RX ADMIN — MICONAZOLE NITRATE: 1.42 POWDER TOPICAL at 12:52

## 2017-04-08 RX ADMIN — INSULIN GLARGINE 40 UNITS: 100 INJECTION, SOLUTION SUBCUTANEOUS at 20:39

## 2017-04-08 RX ADMIN — MOMETASONE FUROATE AND FORMOTEROL FUMARATE DIHYDRATE 2 PUFF: 200; 5 AEROSOL RESPIRATORY (INHALATION) at 07:43

## 2017-04-08 RX ADMIN — MOMETASONE FUROATE AND FORMOTEROL FUMARATE DIHYDRATE 2 PUFF: 200; 5 AEROSOL RESPIRATORY (INHALATION) at 20:28

## 2017-04-08 RX ADMIN — ONDANSETRON 4 MG: 2 INJECTION INTRAMUSCULAR; INTRAVENOUS at 17:03

## 2017-04-08 RX ADMIN — PANTOPRAZOLE SODIUM 40 MG: 40 TABLET, DELAYED RELEASE ORAL at 07:44

## 2017-04-08 RX ADMIN — Medication 325 MG: at 07:44

## 2017-04-08 RX ADMIN — ENOXAPARIN SODIUM 40 MG: 40 INJECTION SUBCUTANEOUS at 07:43

## 2017-04-08 RX ADMIN — FAMOTIDINE 20 MG: 20 TABLET, FILM COATED ORAL at 07:44

## 2017-04-08 RX ADMIN — INSULIN LISPRO 2 UNITS: 100 INJECTION, SOLUTION INTRAVENOUS; SUBCUTANEOUS at 12:46

## 2017-04-08 RX ADMIN — PANTOPRAZOLE SODIUM 40 MG: 40 TABLET, DELAYED RELEASE ORAL at 17:03

## 2017-04-08 RX ADMIN — MICONAZOLE NITRATE: 1.42 POWDER TOPICAL at 20:45

## 2017-04-08 RX ADMIN — METFORMIN HYDROCHLORIDE 1000 MG: 1000 TABLET, FILM COATED ORAL at 17:03

## 2017-04-08 RX ADMIN — PIPERACILLIN SODIUM,TAZOBACTAM SODIUM 3.38 G: 3; .375 INJECTION, POWDER, FOR SOLUTION INTRAVENOUS at 22:37

## 2017-04-08 RX ADMIN — VITAMIN B12 0.1 MG ORAL TABLET 100 MCG: 0.1 TABLET ORAL at 11:56

## 2017-04-08 RX ADMIN — METFORMIN HYDROCHLORIDE 1000 MG: 1000 TABLET, FILM COATED ORAL at 07:44

## 2017-04-08 RX ADMIN — LOSARTAN POTASSIUM 25 MG: 25 TABLET ORAL at 08:41

## 2017-04-08 RX ADMIN — AMLODIPINE BESYLATE 5 MG: 5 TABLET ORAL at 07:44

## 2017-04-08 RX ADMIN — Medication 400 MG: at 20:31

## 2017-04-08 RX ADMIN — PIPERACILLIN SODIUM,TAZOBACTAM SODIUM 3.38 G: 3; .375 INJECTION, POWDER, FOR SOLUTION INTRAVENOUS at 15:15

## 2017-04-08 RX ADMIN — DOCUSATE SODIUM 100 MG: 100 CAPSULE, LIQUID FILLED ORAL at 07:44

## 2017-04-08 RX ADMIN — CITALOPRAM HYDROBROMIDE 20 MG: 20 TABLET ORAL at 07:44

## 2017-04-08 RX ADMIN — CLOPIDOGREL BISULFATE 75 MG: 75 TABLET ORAL at 07:44

## 2017-04-08 RX ADMIN — TOPIRAMATE 100 MG: 100 TABLET, FILM COATED ORAL at 20:32

## 2017-04-08 RX ADMIN — CARVEDILOL 3.12 MG: 3.12 TABLET, FILM COATED ORAL at 07:44

## 2017-04-08 RX ADMIN — CITALOPRAM HYDROBROMIDE 20 MG: 20 TABLET ORAL at 20:32

## 2017-04-08 RX ADMIN — PIPERACILLIN SODIUM,TAZOBACTAM SODIUM 3.38 G: 3; .375 INJECTION, POWDER, FOR SOLUTION INTRAVENOUS at 06:19

## 2017-04-08 ASSESSMENT — PAIN SCALES - GENERAL
PAINLEVEL_OUTOF10: 0
PAINLEVEL_OUTOF10: 0
PAINLEVEL_OUTOF10: 7
PAINLEVEL_OUTOF10: 8

## 2017-04-08 ASSESSMENT — PAIN DESCRIPTION - ONSET: ONSET: ON-GOING

## 2017-04-08 ASSESSMENT — PAIN DESCRIPTION - PAIN TYPE: TYPE: ACUTE PAIN

## 2017-04-08 ASSESSMENT — PAIN DESCRIPTION - ORIENTATION: ORIENTATION: UPPER

## 2017-04-08 ASSESSMENT — PAIN DESCRIPTION - DESCRIPTORS: DESCRIPTORS: ACHING;SHARP

## 2017-04-08 ASSESSMENT — PAIN DESCRIPTION - LOCATION: LOCATION: ABDOMEN;BACK

## 2017-04-09 LAB
GLUCOSE BLD-MCNC: 110 MG/DL (ref 65–105)
GLUCOSE BLD-MCNC: 129 MG/DL (ref 65–105)
GLUCOSE BLD-MCNC: 86 MG/DL (ref 65–105)
GLUCOSE BLD-MCNC: 90 MG/DL (ref 65–105)
GLUCOSE BLD-MCNC: 95 MG/DL (ref 65–105)

## 2017-04-09 PROCEDURE — G0378 HOSPITAL OBSERVATION PER HR: HCPCS

## 2017-04-09 PROCEDURE — 6360000002 HC RX W HCPCS: Performed by: INTERNAL MEDICINE

## 2017-04-09 PROCEDURE — 99232 SBSQ HOSP IP/OBS MODERATE 35: CPT | Performed by: INTERNAL MEDICINE

## 2017-04-09 PROCEDURE — 82947 ASSAY GLUCOSE BLOOD QUANT: CPT

## 2017-04-09 PROCEDURE — 6360000002 HC RX W HCPCS: Performed by: NURSE PRACTITIONER

## 2017-04-09 PROCEDURE — 6370000000 HC RX 637 (ALT 250 FOR IP): Performed by: NURSE PRACTITIONER

## 2017-04-09 PROCEDURE — 1200000000 HC SEMI PRIVATE

## 2017-04-09 PROCEDURE — 2580000003 HC RX 258: Performed by: INTERNAL MEDICINE

## 2017-04-09 PROCEDURE — 6370000000 HC RX 637 (ALT 250 FOR IP): Performed by: INTERNAL MEDICINE

## 2017-04-09 RX ORDER — POLYETHYLENE GLYCOL 3350 17 G/17G
250 POWDER, FOR SOLUTION ORAL ONCE
Status: COMPLETED | OUTPATIENT
Start: 2017-04-09 | End: 2017-04-09

## 2017-04-09 RX ADMIN — POLYETHYLENE GLYCOL 3350 250 G: 17 POWDER, FOR SOLUTION ORAL at 15:33

## 2017-04-09 RX ADMIN — Medication 400 MG: at 07:42

## 2017-04-09 RX ADMIN — CARVEDILOL 3.12 MG: 3.12 TABLET, FILM COATED ORAL at 21:23

## 2017-04-09 RX ADMIN — METFORMIN HYDROCHLORIDE 1000 MG: 1000 TABLET, FILM COATED ORAL at 07:43

## 2017-04-09 RX ADMIN — DOCUSATE SODIUM 100 MG: 100 CAPSULE, LIQUID FILLED ORAL at 07:42

## 2017-04-09 RX ADMIN — PANTOPRAZOLE SODIUM 40 MG: 40 TABLET, DELAYED RELEASE ORAL at 07:42

## 2017-04-09 RX ADMIN — POLYETHYLENE GLYCOL 3350 250 G: 17 POWDER, FOR SOLUTION ORAL at 17:37

## 2017-04-09 RX ADMIN — DOCUSATE SODIUM 100 MG: 100 CAPSULE, LIQUID FILLED ORAL at 21:23

## 2017-04-09 RX ADMIN — MOMETASONE FUROATE AND FORMOTEROL FUMARATE DIHYDRATE 2 PUFF: 200; 5 AEROSOL RESPIRATORY (INHALATION) at 07:43

## 2017-04-09 RX ADMIN — FAMOTIDINE 20 MG: 20 TABLET, FILM COATED ORAL at 07:43

## 2017-04-09 RX ADMIN — CITALOPRAM HYDROBROMIDE 20 MG: 20 TABLET ORAL at 21:23

## 2017-04-09 RX ADMIN — Medication 400 MG: at 21:23

## 2017-04-09 RX ADMIN — Medication 50 UNITS: at 08:30

## 2017-04-09 RX ADMIN — PANTOPRAZOLE SODIUM 40 MG: 40 TABLET, DELAYED RELEASE ORAL at 17:41

## 2017-04-09 RX ADMIN — MICONAZOLE NITRATE: 1.42 POWDER TOPICAL at 21:24

## 2017-04-09 RX ADMIN — CARVEDILOL 3.12 MG: 3.12 TABLET, FILM COATED ORAL at 07:42

## 2017-04-09 RX ADMIN — ONDANSETRON 4 MG: 2 INJECTION INTRAMUSCULAR; INTRAVENOUS at 21:22

## 2017-04-09 RX ADMIN — VITAMIN B12 0.1 MG ORAL TABLET 100 MCG: 0.1 TABLET ORAL at 07:41

## 2017-04-09 RX ADMIN — ENOXAPARIN SODIUM 40 MG: 40 INJECTION SUBCUTANEOUS at 07:41

## 2017-04-09 RX ADMIN — MAGESIUM CITRATE 296 ML: 1.75 LIQUID ORAL at 15:58

## 2017-04-09 RX ADMIN — CEFTRIAXONE SODIUM 1 G: 1 INJECTION, POWDER, FOR SOLUTION INTRAMUSCULAR; INTRAVENOUS at 16:03

## 2017-04-09 RX ADMIN — MICONAZOLE NITRATE: 1.42 POWDER TOPICAL at 07:43

## 2017-04-09 RX ADMIN — ISOSORBIDE MONONITRATE 30 MG: 30 TABLET, EXTENDED RELEASE ORAL at 07:43

## 2017-04-09 RX ADMIN — CITALOPRAM HYDROBROMIDE 20 MG: 20 TABLET ORAL at 07:42

## 2017-04-09 RX ADMIN — AMLODIPINE BESYLATE 5 MG: 5 TABLET ORAL at 07:43

## 2017-04-09 RX ADMIN — BISACODYL 20 MG: 5 TABLET, COATED ORAL at 15:33

## 2017-04-09 RX ADMIN — TOPIRAMATE 100 MG: 100 TABLET, FILM COATED ORAL at 21:23

## 2017-04-09 RX ADMIN — ATORVASTATIN CALCIUM 80 MG: 80 TABLET, FILM COATED ORAL at 21:23

## 2017-04-09 RX ADMIN — LOSARTAN POTASSIUM 25 MG: 25 TABLET ORAL at 07:42

## 2017-04-09 RX ADMIN — CLOPIDOGREL BISULFATE 75 MG: 75 TABLET ORAL at 07:43

## 2017-04-09 RX ADMIN — Medication 325 MG: at 07:43

## 2017-04-09 RX ADMIN — SODIUM CHLORIDE AND POTASSIUM CHLORIDE: 9; 1.49 INJECTION, SOLUTION INTRAVENOUS at 21:37

## 2017-04-09 RX ADMIN — OXYCODONE HYDROCHLORIDE AND ACETAMINOPHEN 1 TABLET: 5; 325 TABLET ORAL at 21:23

## 2017-04-09 RX ADMIN — PIPERACILLIN SODIUM,TAZOBACTAM SODIUM 3.38 G: 3; .375 INJECTION, POWDER, FOR SOLUTION INTRAVENOUS at 06:24

## 2017-04-09 RX ADMIN — Medication 10 MG: at 21:23

## 2017-04-09 RX ADMIN — MOMETASONE FUROATE AND FORMOTEROL FUMARATE DIHYDRATE 2 PUFF: 200; 5 AEROSOL RESPIRATORY (INHALATION) at 21:22

## 2017-04-09 RX ADMIN — DIPHENHYDRAMINE HCL 25 MG: 25 TABLET ORAL at 21:29

## 2017-04-09 ASSESSMENT — PAIN SCALES - GENERAL
PAINLEVEL_OUTOF10: 3
PAINLEVEL_OUTOF10: 6
PAINLEVEL_OUTOF10: 5
PAINLEVEL_OUTOF10: 6

## 2017-04-09 ASSESSMENT — PAIN DESCRIPTION - PAIN TYPE
TYPE: ACUTE PAIN

## 2017-04-09 ASSESSMENT — PAIN DESCRIPTION - LOCATION
LOCATION: ABDOMEN

## 2017-04-09 ASSESSMENT — PAIN DESCRIPTION - FREQUENCY
FREQUENCY: CONTINUOUS
FREQUENCY: CONTINUOUS

## 2017-04-09 ASSESSMENT — PAIN DESCRIPTION - ORIENTATION
ORIENTATION: LEFT;UPPER
ORIENTATION: LEFT;UPPER
ORIENTATION: LOWER

## 2017-04-09 ASSESSMENT — PAIN DESCRIPTION - DESCRIPTORS
DESCRIPTORS: ACHING

## 2017-04-09 ASSESSMENT — PAIN DESCRIPTION - ONSET
ONSET: ON-GOING
ONSET: ON-GOING

## 2017-04-10 VITALS
WEIGHT: 249.12 LBS | BODY MASS INDEX: 45.84 KG/M2 | DIASTOLIC BLOOD PRESSURE: 64 MMHG | TEMPERATURE: 97.7 F | RESPIRATION RATE: 16 BRPM | SYSTOLIC BLOOD PRESSURE: 110 MMHG | HEIGHT: 62 IN | HEART RATE: 62 BPM | OXYGEN SATURATION: 98 %

## 2017-04-10 LAB
DATE, STOOL #1: ABNORMAL
DATE, STOOL #2: ABNORMAL
DATE, STOOL #3: ABNORMAL
DIRECT EXAM: NORMAL
GLUCOSE BLD-MCNC: 107 MG/DL (ref 65–105)
GLUCOSE BLD-MCNC: 182 MG/DL (ref 65–105)
GLUCOSE BLD-MCNC: 200 MG/DL (ref 65–105)
GLUCOSE BLD-MCNC: 40 MG/DL (ref 65–105)
GLUCOSE BLD-MCNC: 81 MG/DL (ref 65–105)
GLUCOSE BLD-MCNC: 86 MG/DL (ref 65–105)
GLUCOSE BLD-MCNC: 87 MG/DL (ref 65–105)
HEMOCCULT SP1 STL QL: POSITIVE
HEMOCCULT SP2 STL QL: ABNORMAL
HEMOCCULT SP3 STL QL: ABNORMAL
LACTOFERRIN, QUAL: ABNORMAL
Lab: NORMAL
Lab: NORMAL
SPECIMEN DESCRIPTION: NORMAL
STATUS: NORMAL
STATUS: NORMAL
TIME, STOOL #1: 1815
TIME, STOOL #2: ABNORMAL
TIME, STOOL #3: ABNORMAL

## 2017-04-10 PROCEDURE — 1200000000 HC SEMI PRIVATE

## 2017-04-10 PROCEDURE — 99239 HOSP IP/OBS DSCHRG MGMT >30: CPT | Performed by: INTERNAL MEDICINE

## 2017-04-10 PROCEDURE — G0378 HOSPITAL OBSERVATION PER HR: HCPCS

## 2017-04-10 PROCEDURE — 6370000000 HC RX 637 (ALT 250 FOR IP): Performed by: NURSE PRACTITIONER

## 2017-04-10 PROCEDURE — 45385 COLONOSCOPY W/LESION REMOVAL: CPT | Performed by: INTERNAL MEDICINE

## 2017-04-10 PROCEDURE — 99152 MOD SED SAME PHYS/QHP 5/>YRS: CPT | Performed by: INTERNAL MEDICINE

## 2017-04-10 PROCEDURE — 99153 MOD SED SAME PHYS/QHP EA: CPT | Performed by: INTERNAL MEDICINE

## 2017-04-10 PROCEDURE — 3609010400 HC COLONOSCOPY POLYPECTOMY HOT BIOPSY: Performed by: INTERNAL MEDICINE

## 2017-04-10 PROCEDURE — 7100000010 HC PHASE II RECOVERY - FIRST 15 MIN: Performed by: INTERNAL MEDICINE

## 2017-04-10 PROCEDURE — 88305 TISSUE EXAM BY PATHOLOGIST: CPT

## 2017-04-10 PROCEDURE — 82947 ASSAY GLUCOSE BLOOD QUANT: CPT

## 2017-04-10 PROCEDURE — 7100000011 HC PHASE II RECOVERY - ADDTL 15 MIN: Performed by: INTERNAL MEDICINE

## 2017-04-10 PROCEDURE — 6360000002 HC RX W HCPCS: Performed by: INTERNAL MEDICINE

## 2017-04-10 PROCEDURE — 2580000003 HC RX 258: Performed by: NURSE PRACTITIONER

## 2017-04-10 RX ORDER — MEPERIDINE HYDROCHLORIDE 50 MG/ML
INJECTION INTRAMUSCULAR; INTRAVENOUS; SUBCUTANEOUS PRN
Status: DISCONTINUED | OUTPATIENT
Start: 2017-04-10 | End: 2017-04-10 | Stop reason: HOSPADM

## 2017-04-10 RX ORDER — SODIUM CHLORIDE AND POTASSIUM CHLORIDE .9; .15 G/100ML; G/100ML
SOLUTION INTRAVENOUS CONTINUOUS
Status: DISCONTINUED | OUTPATIENT
Start: 2017-04-10 | End: 2017-04-10 | Stop reason: HOSPADM

## 2017-04-10 RX ORDER — MIDAZOLAM HYDROCHLORIDE 1 MG/ML
INJECTION INTRAMUSCULAR; INTRAVENOUS PRN
Status: DISCONTINUED | OUTPATIENT
Start: 2017-04-10 | End: 2017-04-10 | Stop reason: HOSPADM

## 2017-04-10 RX ORDER — CEPHALEXIN 500 MG/1
500 CAPSULE ORAL 3 TIMES DAILY
Qty: 15 CAPSULE | Refills: 0 | Status: SHIPPED | OUTPATIENT
Start: 2017-04-10 | End: 2017-04-19 | Stop reason: ALTCHOICE

## 2017-04-10 RX ADMIN — VITAMIN B12 0.1 MG ORAL TABLET 100 MCG: 0.1 TABLET ORAL at 13:44

## 2017-04-10 RX ADMIN — CITALOPRAM HYDROBROMIDE 20 MG: 20 TABLET ORAL at 13:43

## 2017-04-10 RX ADMIN — METFORMIN HYDROCHLORIDE 1000 MG: 1000 TABLET, FILM COATED ORAL at 18:19

## 2017-04-10 RX ADMIN — INSULIN LISPRO 4 UNITS: 100 INJECTION, SOLUTION INTRAVENOUS; SUBCUTANEOUS at 17:16

## 2017-04-10 RX ADMIN — MOMETASONE FUROATE AND FORMOTEROL FUMARATE DIHYDRATE 2 PUFF: 200; 5 AEROSOL RESPIRATORY (INHALATION) at 08:36

## 2017-04-10 RX ADMIN — DEXTROSE MONOHYDRATE 12.5 G: 25 INJECTION, SOLUTION INTRAVENOUS at 07:40

## 2017-04-10 RX ADMIN — DEXTROSE MONOHYDRATE 100 ML/HR: 50 INJECTION, SOLUTION INTRAVENOUS at 07:53

## 2017-04-10 RX ADMIN — Medication 325 MG: at 13:43

## 2017-04-10 RX ADMIN — Medication 400 MG: at 13:43

## 2017-04-10 RX ADMIN — MICONAZOLE NITRATE: 1.42 POWDER TOPICAL at 13:47

## 2017-04-10 RX ADMIN — FAMOTIDINE 20 MG: 20 TABLET, FILM COATED ORAL at 13:44

## 2017-04-10 RX ADMIN — PANTOPRAZOLE SODIUM 40 MG: 40 TABLET, DELAYED RELEASE ORAL at 14:00

## 2017-04-10 RX ADMIN — ISOSORBIDE MONONITRATE 30 MG: 30 TABLET, EXTENDED RELEASE ORAL at 13:44

## 2017-04-10 ASSESSMENT — PAIN SCALES - GENERAL
PAINLEVEL_OUTOF10: 2
PAINLEVEL_OUTOF10: 3

## 2017-04-10 ASSESSMENT — PAIN DESCRIPTION - PAIN TYPE: TYPE: SURGICAL PAIN;ACUTE PAIN

## 2017-04-10 ASSESSMENT — PAIN DESCRIPTION - LOCATION: LOCATION: ABDOMEN

## 2017-04-10 ASSESSMENT — PAIN DESCRIPTION - DESCRIPTORS: DESCRIPTORS: CRAMPING

## 2017-04-10 ASSESSMENT — PAIN - FUNCTIONAL ASSESSMENT: PAIN_FUNCTIONAL_ASSESSMENT: 0-10

## 2017-04-11 ENCOUNTER — CARE COORDINATION (OUTPATIENT)
Dept: CARE COORDINATION | Age: 67
End: 2017-04-11

## 2017-04-11 LAB
EKG ATRIAL RATE: 83 BPM
EKG P AXIS: 70 DEGREES
EKG P-R INTERVAL: 136 MS
EKG Q-T INTERVAL: 298 MS
EKG QRS DURATION: 68 MS
EKG QTC CALCULATION (BAZETT): 350 MS
EKG R AXIS: -5 DEGREES
EKG T AXIS: 84 DEGREES
EKG VENTRICULAR RATE: 83 BPM
SURGICAL PATHOLOGY REPORT: NORMAL

## 2017-04-19 ENCOUNTER — OFFICE VISIT (OUTPATIENT)
Dept: FAMILY MEDICINE CLINIC | Age: 67
End: 2017-04-19
Payer: MEDICARE

## 2017-04-19 ENCOUNTER — HOSPITAL ENCOUNTER (OUTPATIENT)
Dept: PAIN MANAGEMENT | Age: 67
Discharge: HOME | End: 2017-04-19
Payer: MEDICARE

## 2017-04-19 VITALS
DIASTOLIC BLOOD PRESSURE: 67 MMHG | WEIGHT: 238 LBS | SYSTOLIC BLOOD PRESSURE: 118 MMHG | HEIGHT: 61 IN | HEART RATE: 73 BPM | BODY MASS INDEX: 44.93 KG/M2 | RESPIRATION RATE: 20 BRPM | TEMPERATURE: 97 F

## 2017-04-19 DIAGNOSIS — M54.81 BILATERAL OCCIPITAL NEURALGIA: ICD-10-CM

## 2017-04-19 DIAGNOSIS — G43.709 CHRONIC MIGRAINE WITHOUT AURA WITHOUT STATUS MIGRAINOSUS, NOT INTRACTABLE: ICD-10-CM

## 2017-04-19 DIAGNOSIS — M47.817 LUMBOSACRAL SPONDYLOSIS WITHOUT MYELOPATHY: ICD-10-CM

## 2017-04-19 DIAGNOSIS — F32.89 OTHER DEPRESSION: ICD-10-CM

## 2017-04-19 DIAGNOSIS — G89.29 ENCOUNTER FOR CHRONIC PAIN MANAGEMENT: Primary | ICD-10-CM

## 2017-04-19 DIAGNOSIS — M50.30 DEGENERATIVE CERVICAL DISC: ICD-10-CM

## 2017-04-19 DIAGNOSIS — M25.562 CHRONIC PAIN OF LEFT KNEE: ICD-10-CM

## 2017-04-19 DIAGNOSIS — M51.37 DEGENERATION OF LUMBAR OR LUMBOSACRAL INTERVERTEBRAL DISC: ICD-10-CM

## 2017-04-19 DIAGNOSIS — N39.0 URINARY TRACT INFECTION WITHOUT HEMATURIA, SITE UNSPECIFIED: ICD-10-CM

## 2017-04-19 DIAGNOSIS — M46.1 SACROILIITIS, NOT ELSEWHERE CLASSIFIED (HCC): ICD-10-CM

## 2017-04-19 DIAGNOSIS — G89.29 CHRONIC PAIN OF LEFT KNEE: ICD-10-CM

## 2017-04-19 DIAGNOSIS — M47.812 OSTEOARTHRITIS OF CERVICAL SPINE, UNSPECIFIED SPINAL OSTEOARTHRITIS COMPLICATION STATUS: ICD-10-CM

## 2017-04-19 DIAGNOSIS — E66.01 OBESITY, MORBID, BMI 40.0-49.9 (HCC): ICD-10-CM

## 2017-04-19 DIAGNOSIS — Z51.81 ENCOUNTER FOR MEDICATION MONITORING: ICD-10-CM

## 2017-04-19 DIAGNOSIS — M54.16 LUMBAR RADICULOPATHY, CHRONIC: ICD-10-CM

## 2017-04-19 DIAGNOSIS — G43.009 NONINTRACTABLE MIGRAINE, UNSPECIFIED MIGRAINE TYPE: ICD-10-CM

## 2017-04-19 DIAGNOSIS — K57.31 DIVERTICULOSIS OF LARGE INTESTINE WITH HEMORRHAGE: Primary | ICD-10-CM

## 2017-04-19 PROCEDURE — 99213 OFFICE O/P EST LOW 20 MIN: CPT | Performed by: NURSE PRACTITIONER

## 2017-04-19 PROCEDURE — 99213 OFFICE O/P EST LOW 20 MIN: CPT

## 2017-04-19 PROCEDURE — 99495 TRANSJ CARE MGMT MOD F2F 14D: CPT | Performed by: FAMILY MEDICINE

## 2017-04-19 RX ORDER — OXYCODONE HYDROCHLORIDE AND ACETAMINOPHEN 5; 325 MG/1; MG/1
1 TABLET ORAL EVERY 6 HOURS PRN
Qty: 100 TABLET | Refills: 0 | Status: SHIPPED | OUTPATIENT
Start: 2017-04-22 | End: 2017-05-22 | Stop reason: SDUPTHER

## 2017-05-22 ENCOUNTER — HOSPITAL ENCOUNTER (OUTPATIENT)
Dept: PAIN MANAGEMENT | Age: 67
Discharge: HOME | End: 2017-05-22
Payer: MEDICARE

## 2017-05-22 DIAGNOSIS — Z51.81 ENCOUNTER FOR MEDICATION MONITORING: ICD-10-CM

## 2017-05-22 DIAGNOSIS — M54.16 LUMBAR RADICULOPATHY, CHRONIC: ICD-10-CM

## 2017-05-22 DIAGNOSIS — G43.709 CHRONIC MIGRAINE WITHOUT AURA WITHOUT STATUS MIGRAINOSUS, NOT INTRACTABLE: ICD-10-CM

## 2017-05-22 DIAGNOSIS — M54.81 BILATERAL OCCIPITAL NEURALGIA: ICD-10-CM

## 2017-05-22 DIAGNOSIS — M47.817 LUMBOSACRAL SPONDYLOSIS WITHOUT MYELOPATHY: ICD-10-CM

## 2017-05-22 DIAGNOSIS — F32.89 OTHER DEPRESSION: ICD-10-CM

## 2017-05-22 DIAGNOSIS — G89.29 ENCOUNTER FOR CHRONIC PAIN MANAGEMENT: Primary | ICD-10-CM

## 2017-05-22 DIAGNOSIS — M25.562 CHRONIC PAIN OF LEFT KNEE: ICD-10-CM

## 2017-05-22 DIAGNOSIS — G89.29 CHRONIC PAIN OF LEFT KNEE: ICD-10-CM

## 2017-05-22 DIAGNOSIS — M50.30 DEGENERATIVE CERVICAL DISC: ICD-10-CM

## 2017-05-22 PROCEDURE — 99213 OFFICE O/P EST LOW 20 MIN: CPT

## 2017-05-22 PROCEDURE — G0463 HOSPITAL OUTPT CLINIC VISIT: HCPCS

## 2017-05-22 PROCEDURE — 99213 OFFICE O/P EST LOW 20 MIN: CPT | Performed by: NURSE PRACTITIONER

## 2017-05-22 RX ORDER — OXYCODONE HYDROCHLORIDE AND ACETAMINOPHEN 5; 325 MG/1; MG/1
1 TABLET ORAL EVERY 6 HOURS PRN
Qty: 100 TABLET | Refills: 0 | Status: SHIPPED | OUTPATIENT
Start: 2017-05-22 | End: 2017-06-21 | Stop reason: SDUPTHER

## 2017-05-23 ENCOUNTER — TELEPHONE (OUTPATIENT)
Dept: FAMILY MEDICINE CLINIC | Age: 67
End: 2017-05-23

## 2017-05-23 ENCOUNTER — PROCEDURE VISIT (OUTPATIENT)
Dept: PODIATRY | Age: 67
End: 2017-05-23
Payer: MEDICARE

## 2017-05-23 VITALS
HEART RATE: 66 BPM | HEIGHT: 62 IN | DIASTOLIC BLOOD PRESSURE: 71 MMHG | BODY MASS INDEX: 42.33 KG/M2 | SYSTOLIC BLOOD PRESSURE: 128 MMHG | WEIGHT: 230 LBS

## 2017-05-23 DIAGNOSIS — M79.674 PAIN OF TOES OF BOTH FEET: ICD-10-CM

## 2017-05-23 DIAGNOSIS — B35.1 ONYCHOMYCOSIS OF TOENAIL: Primary | ICD-10-CM

## 2017-05-23 DIAGNOSIS — E11.51 TYPE 2 DIABETES MELLITUS WITH PERIPHERAL VASCULAR DISEASE (HCC): ICD-10-CM

## 2017-05-23 DIAGNOSIS — M79.675 PAIN OF TOES OF BOTH FEET: ICD-10-CM

## 2017-05-23 PROCEDURE — 11721 DEBRIDE NAIL 6 OR MORE: CPT | Performed by: PODIATRIST

## 2017-05-28 ASSESSMENT — ENCOUNTER SYMPTOMS
SHORTNESS OF BREATH: 0
DIARRHEA: 0
NAUSEA: 0
BACK PAIN: 0
COLOR CHANGE: 0

## 2017-06-01 ENCOUNTER — HOSPITAL ENCOUNTER (OUTPATIENT)
Age: 67
Discharge: HOME | End: 2017-06-01
Payer: MEDICARE

## 2017-06-01 DIAGNOSIS — E78.5 HYPERLIPIDEMIA, UNSPECIFIED HYPERLIPIDEMIA TYPE: ICD-10-CM

## 2017-06-01 DIAGNOSIS — E11.42 TYPE 2 DIABETES MELLITUS WITH DIABETIC POLYNEUROPATHY, WITH LONG-TERM CURRENT USE OF INSULIN (HCC): ICD-10-CM

## 2017-06-01 DIAGNOSIS — Z79.4 TYPE 2 DIABETES MELLITUS WITHOUT COMPLICATION, WITH LONG-TERM CURRENT USE OF INSULIN (HCC): ICD-10-CM

## 2017-06-01 DIAGNOSIS — Z79.4 TYPE 2 DIABETES MELLITUS WITH DIABETIC POLYNEUROPATHY, WITH LONG-TERM CURRENT USE OF INSULIN (HCC): ICD-10-CM

## 2017-06-01 DIAGNOSIS — E11.9 TYPE 2 DIABETES MELLITUS WITHOUT COMPLICATION, WITH LONG-TERM CURRENT USE OF INSULIN (HCC): ICD-10-CM

## 2017-06-01 DIAGNOSIS — Z00.00 PREVENTATIVE HEALTH CARE: ICD-10-CM

## 2017-06-01 LAB
-: NORMAL
ALBUMIN SERPL-MCNC: 3.9 G/DL (ref 3.5–5.2)
ALBUMIN/GLOBULIN RATIO: ABNORMAL (ref 1–2.5)
ALP BLD-CCNC: 62 U/L (ref 35–104)
ALT SERPL-CCNC: 14 U/L (ref 5–33)
ANION GAP SERPL CALCULATED.3IONS-SCNC: 14 MMOL/L (ref 9–17)
AST SERPL-CCNC: 15 U/L
BILIRUB SERPL-MCNC: 0.32 MG/DL (ref 0.3–1.2)
BUN BLDV-MCNC: 10 MG/DL (ref 8–23)
BUN/CREAT BLD: ABNORMAL (ref 9–20)
CALCIUM SERPL-MCNC: 8.9 MG/DL (ref 8.6–10.4)
CHLORIDE BLD-SCNC: 105 MMOL/L (ref 98–107)
CHOLESTEROL/HDL RATIO: 4.3
CHOLESTEROL: 163 MG/DL
CO2: 21 MMOL/L (ref 20–31)
CREAT SERPL-MCNC: 0.84 MG/DL (ref 0.5–0.9)
CREATININE URINE: 149.3 MG/DL (ref 28–217)
ESTIMATED AVERAGE GLUCOSE: 137 MG/DL
FOLATE: 7.5 NG/ML
GFR AFRICAN AMERICAN: >60 ML/MIN
GFR NON-AFRICAN AMERICAN: >60 ML/MIN
GFR SERPL CREATININE-BSD FRML MDRD: ABNORMAL ML/MIN/{1.73_M2}
GFR SERPL CREATININE-BSD FRML MDRD: ABNORMAL ML/MIN/{1.73_M2}
GLUCOSE BLD-MCNC: 171 MG/DL (ref 70–99)
HBA1C MFR BLD: 6.4 % (ref 4–6)
HDLC SERPL-MCNC: 38 MG/DL
HEPATITIS C ANTIBODY: NONREACTIVE
LDL CHOLESTEROL: 81 MG/DL (ref 0–130)
MICROALBUMIN/CREAT 24H UR: 53 MG/L
MICROALBUMIN/CREAT UR-RTO: 35 MCG/MG CREAT
POTASSIUM SERPL-SCNC: 4 MMOL/L (ref 3.7–5.3)
REASON FOR REJECTION: NORMAL
SODIUM BLD-SCNC: 140 MMOL/L (ref 135–144)
TOTAL PROTEIN: 7.1 G/DL (ref 6.4–8.3)
TRIGL SERPL-MCNC: 222 MG/DL
TSH SERPL DL<=0.05 MIU/L-ACNC: 3.85 MIU/L (ref 0.3–5)
VITAMIN B-12: 357 PG/ML (ref 211–946)
VITAMIN D 25-HYDROXY: 12.2 NG/ML (ref 30–100)
VLDLC SERPL CALC-MCNC: ABNORMAL MG/DL (ref 1–30)
ZZ NTE CLEAN UP: ORDERED TEST: NORMAL
ZZ NTE WITH NAME CLEAN UP: SPECIMEN SOURCE: NORMAL

## 2017-06-01 PROCEDURE — 80053 COMPREHEN METABOLIC PANEL: CPT

## 2017-06-01 PROCEDURE — 83036 HEMOGLOBIN GLYCOSYLATED A1C: CPT

## 2017-06-01 PROCEDURE — 82043 UR ALBUMIN QUANTITATIVE: CPT

## 2017-06-01 PROCEDURE — 82607 VITAMIN B-12: CPT

## 2017-06-01 PROCEDURE — 84443 ASSAY THYROID STIM HORMONE: CPT

## 2017-06-01 PROCEDURE — 36415 COLL VENOUS BLD VENIPUNCTURE: CPT

## 2017-06-01 PROCEDURE — 80061 LIPID PANEL: CPT

## 2017-06-01 PROCEDURE — 82306 VITAMIN D 25 HYDROXY: CPT

## 2017-06-01 PROCEDURE — 82570 ASSAY OF URINE CREATININE: CPT

## 2017-06-01 PROCEDURE — 82746 ASSAY OF FOLIC ACID SERUM: CPT

## 2017-06-01 PROCEDURE — 86803 HEPATITIS C AB TEST: CPT

## 2017-06-06 DIAGNOSIS — E55.9 VITAMIN D DEFICIENCY: Primary | ICD-10-CM

## 2017-06-06 RX ORDER — ERGOCALCIFEROL (VITAMIN D2) 1250 MCG
50000 CAPSULE ORAL WEEKLY
Qty: 4 CAPSULE | Refills: 3 | Status: SHIPPED | OUTPATIENT
Start: 2017-06-06 | End: 2017-09-11 | Stop reason: ALTCHOICE

## 2017-06-06 RX ORDER — UBIDECARENONE 75 MG
CAPSULE ORAL
Qty: 90 TABLET | Refills: 0 | Status: SHIPPED | OUTPATIENT
Start: 2017-06-06 | End: 2017-06-07

## 2017-06-07 ENCOUNTER — OFFICE VISIT (OUTPATIENT)
Dept: FAMILY MEDICINE CLINIC | Age: 67
End: 2017-06-07
Payer: MEDICARE

## 2017-06-07 VITALS
SYSTOLIC BLOOD PRESSURE: 115 MMHG | DIASTOLIC BLOOD PRESSURE: 72 MMHG | HEART RATE: 61 BPM | OXYGEN SATURATION: 98 % | TEMPERATURE: 97.1 F | BODY MASS INDEX: 43.43 KG/M2 | RESPIRATION RATE: 17 BRPM | WEIGHT: 236 LBS | HEIGHT: 62 IN

## 2017-06-07 DIAGNOSIS — I10 ESSENTIAL HYPERTENSION: Primary | ICD-10-CM

## 2017-06-07 DIAGNOSIS — Z79.4 TYPE 2 DIABETES MELLITUS WITHOUT COMPLICATION, WITH LONG-TERM CURRENT USE OF INSULIN (HCC): ICD-10-CM

## 2017-06-07 DIAGNOSIS — E11.9 TYPE 2 DIABETES MELLITUS WITHOUT COMPLICATION, WITH LONG-TERM CURRENT USE OF INSULIN (HCC): ICD-10-CM

## 2017-06-07 PROCEDURE — 99214 OFFICE O/P EST MOD 30 MIN: CPT | Performed by: FAMILY MEDICINE

## 2017-06-07 ASSESSMENT — ENCOUNTER SYMPTOMS
COUGH: 0
NAUSEA: 0
VOMITING: 0
ABDOMINAL PAIN: 0
SHORTNESS OF BREATH: 0

## 2017-06-21 ENCOUNTER — HOSPITAL ENCOUNTER (OUTPATIENT)
Dept: PAIN MANAGEMENT | Age: 67
Discharge: HOME | End: 2017-06-21
Payer: COMMERCIAL

## 2017-06-21 VITALS
SYSTOLIC BLOOD PRESSURE: 121 MMHG | RESPIRATION RATE: 20 BRPM | BODY MASS INDEX: 43.43 KG/M2 | DIASTOLIC BLOOD PRESSURE: 62 MMHG | WEIGHT: 236 LBS | TEMPERATURE: 98.6 F | HEIGHT: 62 IN | HEART RATE: 68 BPM | OXYGEN SATURATION: 98 %

## 2017-06-21 DIAGNOSIS — J44.9 CHRONIC OBSTRUCTIVE PULMONARY DISEASE, UNSPECIFIED COPD TYPE (HCC): ICD-10-CM

## 2017-06-21 DIAGNOSIS — G89.29 CHRONIC PAIN OF LEFT KNEE: ICD-10-CM

## 2017-06-21 DIAGNOSIS — M54.81 BILATERAL OCCIPITAL NEURALGIA: ICD-10-CM

## 2017-06-21 DIAGNOSIS — M54.16 LUMBAR RADICULOPATHY, CHRONIC: ICD-10-CM

## 2017-06-21 DIAGNOSIS — E66.01 OBESITY, MORBID, BMI 40.0-49.9 (HCC): ICD-10-CM

## 2017-06-21 DIAGNOSIS — M47.817 LUMBOSACRAL SPONDYLOSIS WITHOUT MYELOPATHY: ICD-10-CM

## 2017-06-21 DIAGNOSIS — M25.562 CHRONIC PAIN OF LEFT KNEE: ICD-10-CM

## 2017-06-21 DIAGNOSIS — G43.709 CHRONIC MIGRAINE WITHOUT AURA WITHOUT STATUS MIGRAINOSUS, NOT INTRACTABLE: ICD-10-CM

## 2017-06-21 DIAGNOSIS — M50.30 DEGENERATIVE CERVICAL DISC: ICD-10-CM

## 2017-06-21 DIAGNOSIS — Z51.81 ENCOUNTER FOR MEDICATION MONITORING: ICD-10-CM

## 2017-06-21 DIAGNOSIS — G89.29 ENCOUNTER FOR CHRONIC PAIN MANAGEMENT: Primary | ICD-10-CM

## 2017-06-21 DIAGNOSIS — M47.812 OSTEOARTHRITIS OF CERVICAL SPINE, UNSPECIFIED SPINAL OSTEOARTHRITIS COMPLICATION STATUS: ICD-10-CM

## 2017-06-21 PROCEDURE — G0463 HOSPITAL OUTPT CLINIC VISIT: HCPCS

## 2017-06-21 PROCEDURE — 99214 OFFICE O/P EST MOD 30 MIN: CPT | Performed by: PAIN MEDICINE

## 2017-06-21 PROCEDURE — 99213 OFFICE O/P EST LOW 20 MIN: CPT

## 2017-06-21 RX ORDER — OXYCODONE HYDROCHLORIDE AND ACETAMINOPHEN 5; 325 MG/1; MG/1
1 TABLET ORAL EVERY 6 HOURS PRN
Qty: 100 TABLET | Refills: 0 | Status: SHIPPED | OUTPATIENT
Start: 2017-06-21 | End: 2017-07-26 | Stop reason: SDUPTHER

## 2017-06-21 RX ORDER — TOPIRAMATE 100 MG/1
100 TABLET, FILM COATED ORAL NIGHTLY
Qty: 90 TABLET | Refills: 1 | Status: SHIPPED | OUTPATIENT
Start: 2017-06-21 | End: 2017-11-29 | Stop reason: SDUPTHER

## 2017-06-21 ASSESSMENT — ENCOUNTER SYMPTOMS
BLURRED VISION: 0
VOMITING: 0
PHOTOPHOBIA: 0
HEARTBURN: 0
NAUSEA: 0
CONSTIPATION: 0
BACK PAIN: 1
SCALP TENDERNESS: 1
SORE THROAT: 0
WHEEZING: 1
EYES NEGATIVE: 1
COUGH: 1
GASTROINTESTINAL NEGATIVE: 1
SHORTNESS OF BREATH: 1

## 2017-06-21 ASSESSMENT — PAIN DESCRIPTION - DIRECTION: RADIATING_TOWARDS: LOW BACK AREA

## 2017-06-21 ASSESSMENT — PAIN SCALES - GENERAL: PAINLEVEL_OUTOF10: 5

## 2017-06-21 ASSESSMENT — PAIN DESCRIPTION - PROGRESSION: CLINICAL_PROGRESSION: GRADUALLY WORSENING

## 2017-06-21 ASSESSMENT — PAIN DESCRIPTION - DESCRIPTORS: DESCRIPTORS: ACHING;CONSTANT;DULL;NAGGING;SHARP

## 2017-06-21 ASSESSMENT — PAIN DESCRIPTION - PAIN TYPE: TYPE: CHRONIC PAIN

## 2017-06-21 ASSESSMENT — PAIN DESCRIPTION - FREQUENCY: FREQUENCY: CONTINUOUS

## 2017-06-21 ASSESSMENT — PAIN DESCRIPTION - ORIENTATION: ORIENTATION: RIGHT;LEFT;LOWER

## 2017-06-21 ASSESSMENT — PAIN DESCRIPTION - ONSET: ONSET: ON-GOING

## 2017-06-21 ASSESSMENT — PAIN DESCRIPTION - LOCATION: LOCATION: BACK;HIP;LEG

## 2017-06-29 RX ORDER — AMLODIPINE BESYLATE 5 MG/1
TABLET ORAL
Qty: 90 TABLET | Refills: 0 | Status: SHIPPED | OUTPATIENT
Start: 2017-06-29 | End: 2019-04-15

## 2017-07-07 ENCOUNTER — APPOINTMENT (OUTPATIENT)
Dept: GENERAL RADIOLOGY | Age: 67
DRG: 191 | End: 2017-07-07
Payer: MEDICARE

## 2017-07-07 ENCOUNTER — HOSPITAL ENCOUNTER (INPATIENT)
Age: 67
LOS: 3 days | Discharge: HOME HEALTH | DRG: 191 | End: 2017-07-10
Attending: EMERGENCY MEDICINE | Admitting: INTERNAL MEDICINE
Payer: MEDICARE

## 2017-07-07 DIAGNOSIS — J44.1 COPD EXACERBATION (HCC): Primary | ICD-10-CM

## 2017-07-07 DIAGNOSIS — R07.9 CHEST PAIN, UNSPECIFIED TYPE: ICD-10-CM

## 2017-07-07 LAB
ABSOLUTE EOS #: 0.2 K/UL (ref 0–0.4)
ABSOLUTE LYMPH #: 2 K/UL (ref 1–4.8)
ABSOLUTE MONO #: 0.6 K/UL (ref 0.1–1.2)
ANION GAP SERPL CALCULATED.3IONS-SCNC: 14 MMOL/L (ref 9–17)
BASOPHILS # BLD: 1 %
BASOPHILS ABSOLUTE: 0.1 K/UL (ref 0–0.2)
BNP INTERPRETATION: NORMAL
BUN BLDV-MCNC: 14 MG/DL (ref 8–23)
BUN/CREAT BLD: ABNORMAL (ref 9–20)
CALCIUM SERPL-MCNC: 9 MG/DL (ref 8.6–10.4)
CHLORIDE BLD-SCNC: 100 MMOL/L (ref 98–107)
CHP ED QC CHECK: YES
CO2: 23 MMOL/L (ref 20–31)
CREAT SERPL-MCNC: 0.88 MG/DL (ref 0.5–0.9)
D-DIMER QUANTITATIVE: 0.35 MG/L FEU
DIFFERENTIAL TYPE: NORMAL
EOSINOPHILS RELATIVE PERCENT: 2 %
GFR AFRICAN AMERICAN: >60 ML/MIN
GFR NON-AFRICAN AMERICAN: >60 ML/MIN
GFR SERPL CREATININE-BSD FRML MDRD: ABNORMAL ML/MIN/{1.73_M2}
GFR SERPL CREATININE-BSD FRML MDRD: ABNORMAL ML/MIN/{1.73_M2}
GLUCOSE BLD-MCNC: 151 MG/DL
GLUCOSE BLD-MCNC: 151 MG/DL (ref 65–105)
GLUCOSE BLD-MCNC: 169 MG/DL (ref 70–99)
GLUCOSE BLD-MCNC: 360 MG/DL (ref 65–105)
GLUCOSE BLD-MCNC: 464 MG/DL (ref 65–105)
GLUCOSE BLD-MCNC: 484 MG/DL (ref 65–105)
HCT VFR BLD CALC: 37.5 % (ref 36–46)
HEMOGLOBIN: 12.4 G/DL (ref 12–16)
LYMPHOCYTES # BLD: 25 %
MCH RBC QN AUTO: 30.1 PG (ref 26–34)
MCHC RBC AUTO-ENTMCNC: 33 G/DL (ref 31–37)
MCV RBC AUTO: 91.2 FL (ref 80–100)
MONOCYTES # BLD: 8 %
PDW BLD-RTO: 14.6 % (ref 12.5–15.4)
PLATELET # BLD: 193 K/UL (ref 140–450)
PLATELET ESTIMATE: NORMAL
PMV BLD AUTO: 9.7 FL (ref 6–12)
POC TROPONIN I: 0 NG/ML (ref 0–0.1)
POC TROPONIN I: 0 NG/ML (ref 0–0.1)
POC TROPONIN INTERP: NORMAL
POC TROPONIN INTERP: NORMAL
POTASSIUM SERPL-SCNC: 4.8 MMOL/L (ref 3.7–5.3)
PRO-BNP: 268 PG/ML
RBC # BLD: 4.11 M/UL (ref 4–5.2)
RBC # BLD: NORMAL 10*6/UL
SEG NEUTROPHILS: 64 %
SEGMENTED NEUTROPHILS ABSOLUTE COUNT: 5.2 K/UL (ref 1.8–7.7)
SODIUM BLD-SCNC: 137 MMOL/L (ref 135–144)
TROPONIN INTERP: NORMAL
TROPONIN T: <0.03 NG/ML
WBC # BLD: 8.1 K/UL (ref 3.5–11)
WBC # BLD: NORMAL 10*3/UL

## 2017-07-07 PROCEDURE — 85379 FIBRIN DEGRADATION QUANT: CPT

## 2017-07-07 PROCEDURE — 93005 ELECTROCARDIOGRAM TRACING: CPT

## 2017-07-07 PROCEDURE — 96375 TX/PRO/DX INJ NEW DRUG ADDON: CPT

## 2017-07-07 PROCEDURE — 83036 HEMOGLOBIN GLYCOSYLATED A1C: CPT

## 2017-07-07 PROCEDURE — 1200000000 HC SEMI PRIVATE

## 2017-07-07 PROCEDURE — G0378 HOSPITAL OBSERVATION PER HR: HCPCS

## 2017-07-07 PROCEDURE — 83880 ASSAY OF NATRIURETIC PEPTIDE: CPT

## 2017-07-07 PROCEDURE — 94640 AIRWAY INHALATION TREATMENT: CPT

## 2017-07-07 PROCEDURE — 71010 XR CHEST PORTABLE: CPT

## 2017-07-07 PROCEDURE — 6370000000 HC RX 637 (ALT 250 FOR IP): Performed by: EMERGENCY MEDICINE

## 2017-07-07 PROCEDURE — 84484 ASSAY OF TROPONIN QUANT: CPT

## 2017-07-07 PROCEDURE — 96374 THER/PROPH/DIAG INJ IV PUSH: CPT

## 2017-07-07 PROCEDURE — 85025 COMPLETE CBC W/AUTO DIFF WBC: CPT

## 2017-07-07 PROCEDURE — 6370000000 HC RX 637 (ALT 250 FOR IP): Performed by: NURSE PRACTITIONER

## 2017-07-07 PROCEDURE — 36415 COLL VENOUS BLD VENIPUNCTURE: CPT

## 2017-07-07 PROCEDURE — 80048 BASIC METABOLIC PNL TOTAL CA: CPT

## 2017-07-07 PROCEDURE — 6360000002 HC RX W HCPCS: Performed by: EMERGENCY MEDICINE

## 2017-07-07 PROCEDURE — 82947 ASSAY GLUCOSE BLOOD QUANT: CPT

## 2017-07-07 PROCEDURE — 99285 EMERGENCY DEPT VISIT HI MDM: CPT

## 2017-07-07 PROCEDURE — 83735 ASSAY OF MAGNESIUM: CPT

## 2017-07-07 RX ORDER — METHYLPREDNISOLONE SODIUM SUCCINATE 125 MG/2ML
125 INJECTION, POWDER, LYOPHILIZED, FOR SOLUTION INTRAMUSCULAR; INTRAVENOUS ONCE
Status: COMPLETED | OUTPATIENT
Start: 2017-07-07 | End: 2017-07-07

## 2017-07-07 RX ORDER — ASPIRIN 81 MG/1
324 TABLET, CHEWABLE ORAL ONCE
Status: DISCONTINUED | OUTPATIENT
Start: 2017-07-07 | End: 2017-07-10 | Stop reason: HOSPADM

## 2017-07-07 RX ORDER — INSULIN GLARGINE 100 [IU]/ML
40 INJECTION, SOLUTION SUBCUTANEOUS NIGHTLY
Status: DISCONTINUED | OUTPATIENT
Start: 2017-07-07 | End: 2017-07-10 | Stop reason: HOSPADM

## 2017-07-07 RX ORDER — DOCUSATE SODIUM 100 MG/1
100 CAPSULE, LIQUID FILLED ORAL 2 TIMES DAILY
Status: DISCONTINUED | OUTPATIENT
Start: 2017-07-07 | End: 2017-07-10 | Stop reason: HOSPADM

## 2017-07-07 RX ORDER — FUROSEMIDE 20 MG/1
20 TABLET ORAL DAILY PRN
Status: DISCONTINUED | OUTPATIENT
Start: 2017-07-07 | End: 2017-07-10 | Stop reason: HOSPADM

## 2017-07-07 RX ORDER — INSULIN GLARGINE 100 [IU]/ML
50 INJECTION, SOLUTION SUBCUTANEOUS EVERY MORNING
Status: DISCONTINUED | OUTPATIENT
Start: 2017-07-08 | End: 2017-07-10 | Stop reason: HOSPADM

## 2017-07-07 RX ORDER — CLOPIDOGREL BISULFATE 75 MG/1
75 TABLET ORAL DAILY
Status: DISCONTINUED | OUTPATIENT
Start: 2017-07-07 | End: 2017-07-10 | Stop reason: HOSPADM

## 2017-07-07 RX ORDER — ONDANSETRON 2 MG/ML
4 INJECTION INTRAMUSCULAR; INTRAVENOUS ONCE
Status: COMPLETED | OUTPATIENT
Start: 2017-07-07 | End: 2017-07-07

## 2017-07-07 RX ORDER — SODIUM CHLORIDE 0.9 % (FLUSH) 0.9 %
10 SYRINGE (ML) INJECTION PRN
Status: DISCONTINUED | OUTPATIENT
Start: 2017-07-07 | End: 2017-07-10 | Stop reason: HOSPADM

## 2017-07-07 RX ORDER — DEXTROSE MONOHYDRATE 50 MG/ML
100 INJECTION, SOLUTION INTRAVENOUS PRN
Status: DISCONTINUED | OUTPATIENT
Start: 2017-07-07 | End: 2017-07-10 | Stop reason: HOSPADM

## 2017-07-07 RX ORDER — PANTOPRAZOLE SODIUM 40 MG/1
40 TABLET, DELAYED RELEASE ORAL 2 TIMES DAILY
Status: DISCONTINUED | OUTPATIENT
Start: 2017-07-07 | End: 2017-07-10 | Stop reason: HOSPADM

## 2017-07-07 RX ORDER — IPRATROPIUM BROMIDE AND ALBUTEROL SULFATE 2.5; .5 MG/3ML; MG/3ML
1 SOLUTION RESPIRATORY (INHALATION) EVERY 4 HOURS PRN
Status: DISCONTINUED | OUTPATIENT
Start: 2017-07-07 | End: 2017-07-07

## 2017-07-07 RX ORDER — ISOSORBIDE MONONITRATE 30 MG/1
30 TABLET, EXTENDED RELEASE ORAL DAILY
Status: DISCONTINUED | OUTPATIENT
Start: 2017-07-07 | End: 2017-07-10 | Stop reason: HOSPADM

## 2017-07-07 RX ORDER — DIPHENHYDRAMINE HCL 25 MG
25 TABLET ORAL NIGHTLY PRN
Status: DISCONTINUED | OUTPATIENT
Start: 2017-07-07 | End: 2017-07-10 | Stop reason: HOSPADM

## 2017-07-07 RX ORDER — LANOLIN ALCOHOL/MO/W.PET/CERES
325 CREAM (GRAM) TOPICAL
Status: DISCONTINUED | OUTPATIENT
Start: 2017-07-08 | End: 2017-07-10 | Stop reason: HOSPADM

## 2017-07-07 RX ORDER — OXYCODONE HYDROCHLORIDE AND ACETAMINOPHEN 5; 325 MG/1; MG/1
1 TABLET ORAL EVERY 6 HOURS PRN
Status: DISCONTINUED | OUTPATIENT
Start: 2017-07-07 | End: 2017-07-08

## 2017-07-07 RX ORDER — CITALOPRAM 20 MG/1
40 TABLET ORAL DAILY
Status: DISCONTINUED | OUTPATIENT
Start: 2017-07-07 | End: 2017-07-10 | Stop reason: HOSPADM

## 2017-07-07 RX ORDER — SODIUM CHLORIDE 0.9 % (FLUSH) 0.9 %
10 SYRINGE (ML) INJECTION EVERY 12 HOURS SCHEDULED
Status: DISCONTINUED | OUTPATIENT
Start: 2017-07-07 | End: 2017-07-10 | Stop reason: HOSPADM

## 2017-07-07 RX ORDER — ALBUTEROL SULFATE 2.5 MG/3ML
2.5 SOLUTION RESPIRATORY (INHALATION)
Status: DISCONTINUED | OUTPATIENT
Start: 2017-07-07 | End: 2017-07-10 | Stop reason: HOSPADM

## 2017-07-07 RX ORDER — UREA 10 %
10 LOTION (ML) TOPICAL NIGHTLY
Status: DISCONTINUED | OUTPATIENT
Start: 2017-07-07 | End: 2017-07-10 | Stop reason: HOSPADM

## 2017-07-07 RX ORDER — LOSARTAN POTASSIUM 25 MG/1
25 TABLET ORAL DAILY
Status: DISCONTINUED | OUTPATIENT
Start: 2017-07-07 | End: 2017-07-10 | Stop reason: HOSPADM

## 2017-07-07 RX ORDER — MAGNESIUM OXIDE 400 MG/1
400 TABLET ORAL 2 TIMES DAILY
Status: DISCONTINUED | OUTPATIENT
Start: 2017-07-07 | End: 2017-07-10 | Stop reason: HOSPADM

## 2017-07-07 RX ORDER — NICOTINE POLACRILEX 4 MG
15 LOZENGE BUCCAL PRN
Status: DISCONTINUED | OUTPATIENT
Start: 2017-07-07 | End: 2017-07-10 | Stop reason: HOSPADM

## 2017-07-07 RX ORDER — DIPHENHYDRAMINE HYDROCHLORIDE 50 MG/ML
25 INJECTION INTRAMUSCULAR; INTRAVENOUS ONCE
Status: COMPLETED | OUTPATIENT
Start: 2017-07-07 | End: 2017-07-07

## 2017-07-07 RX ORDER — ATORVASTATIN CALCIUM 80 MG/1
80 TABLET, FILM COATED ORAL NIGHTLY
Status: DISCONTINUED | OUTPATIENT
Start: 2017-07-07 | End: 2017-07-10 | Stop reason: HOSPADM

## 2017-07-07 RX ORDER — AMLODIPINE BESYLATE 5 MG/1
5 TABLET ORAL DAILY
Status: DISCONTINUED | OUTPATIENT
Start: 2017-07-07 | End: 2017-07-10 | Stop reason: HOSPADM

## 2017-07-07 RX ORDER — DEXTROSE MONOHYDRATE 25 G/50ML
12.5 INJECTION, SOLUTION INTRAVENOUS PRN
Status: DISCONTINUED | OUTPATIENT
Start: 2017-07-07 | End: 2017-07-10 | Stop reason: HOSPADM

## 2017-07-07 RX ORDER — TOPIRAMATE 100 MG/1
100 TABLET, FILM COATED ORAL NIGHTLY
Status: DISCONTINUED | OUTPATIENT
Start: 2017-07-07 | End: 2017-07-10 | Stop reason: HOSPADM

## 2017-07-07 RX ORDER — FENTANYL CITRATE 50 UG/ML
50 INJECTION, SOLUTION INTRAMUSCULAR; INTRAVENOUS ONCE
Status: COMPLETED | OUTPATIENT
Start: 2017-07-07 | End: 2017-07-07

## 2017-07-07 RX ORDER — CARVEDILOL 3.12 MG/1
3.12 TABLET ORAL 2 TIMES DAILY WITH MEALS
Status: DISCONTINUED | OUTPATIENT
Start: 2017-07-07 | End: 2017-07-10 | Stop reason: HOSPADM

## 2017-07-07 RX ADMIN — PANTOPRAZOLE SODIUM 40 MG: 40 TABLET, DELAYED RELEASE ORAL at 20:09

## 2017-07-07 RX ADMIN — MAGNESIUM OXIDE 400 MG: 400 TABLET ORAL at 21:34

## 2017-07-07 RX ADMIN — INSULIN GLARGINE 40 UNITS: 100 INJECTION, SOLUTION SUBCUTANEOUS at 22:34

## 2017-07-07 RX ADMIN — INSULIN LISPRO 5 UNITS: 100 INJECTION, SOLUTION INTRAVENOUS; SUBCUTANEOUS at 20:12

## 2017-07-07 RX ADMIN — FENTANYL CITRATE 50 MCG: 50 INJECTION, SOLUTION INTRAMUSCULAR; INTRAVENOUS at 15:45

## 2017-07-07 RX ADMIN — INSULIN LISPRO 10 UNITS: 100 INJECTION, SOLUTION INTRAVENOUS; SUBCUTANEOUS at 22:34

## 2017-07-07 RX ADMIN — Medication 10 MG: at 21:34

## 2017-07-07 RX ADMIN — DIPHENHYDRAMINE HYDROCHLORIDE 25 MG: 50 INJECTION, SOLUTION INTRAMUSCULAR; INTRAVENOUS at 15:45

## 2017-07-07 RX ADMIN — MOMETASONE FUROATE AND FORMOTEROL FUMARATE DIHYDRATE 2 PUFF: 200; 5 AEROSOL RESPIRATORY (INHALATION) at 20:27

## 2017-07-07 RX ADMIN — ATORVASTATIN CALCIUM 80 MG: 80 TABLET, FILM COATED ORAL at 20:09

## 2017-07-07 RX ADMIN — TOPIRAMATE 100 MG: 100 TABLET, FILM COATED ORAL at 21:34

## 2017-07-07 RX ADMIN — OXYCODONE HYDROCHLORIDE AND ACETAMINOPHEN 1 TABLET: 5; 325 TABLET ORAL at 20:09

## 2017-07-07 RX ADMIN — ONDANSETRON 4 MG: 2 INJECTION, SOLUTION INTRAMUSCULAR; INTRAVENOUS at 15:45

## 2017-07-07 RX ADMIN — IPRATROPIUM BROMIDE AND ALBUTEROL SULFATE 1 AMPULE: .5; 3 SOLUTION RESPIRATORY (INHALATION) at 16:19

## 2017-07-07 RX ADMIN — METHYLPREDNISOLONE SODIUM SUCCINATE 125 MG: 125 INJECTION, POWDER, FOR SOLUTION INTRAMUSCULAR; INTRAVENOUS at 15:17

## 2017-07-07 RX ADMIN — DOCUSATE SODIUM 100 MG: 100 CAPSULE ORAL at 20:09

## 2017-07-07 ASSESSMENT — PAIN DESCRIPTION - LOCATION
LOCATION: CHEST
LOCATION: CHEST

## 2017-07-07 ASSESSMENT — PAIN SCALES - GENERAL
PAINLEVEL_OUTOF10: 7

## 2017-07-07 ASSESSMENT — PAIN DESCRIPTION - PROGRESSION
CLINICAL_PROGRESSION: NOT CHANGED
CLINICAL_PROGRESSION: NOT CHANGED

## 2017-07-07 ASSESSMENT — ENCOUNTER SYMPTOMS
VOMITING: 0
NAUSEA: 1
ABDOMINAL PAIN: 0
SHORTNESS OF BREATH: 1
CHEST TIGHTNESS: 1
WHEEZING: 1
COLOR CHANGE: 0

## 2017-07-07 ASSESSMENT — PAIN DESCRIPTION - PAIN TYPE
TYPE: ACUTE PAIN
TYPE: ACUTE PAIN

## 2017-07-07 ASSESSMENT — PAIN DESCRIPTION - DESCRIPTORS: DESCRIPTORS: CONSTANT

## 2017-07-07 ASSESSMENT — PAIN DESCRIPTION - ORIENTATION: ORIENTATION: MID

## 2017-07-07 ASSESSMENT — PAIN DESCRIPTION - FREQUENCY: FREQUENCY: CONTINUOUS

## 2017-07-08 LAB
-: NORMAL
AMORPHOUS: NORMAL
ANION GAP SERPL CALCULATED.3IONS-SCNC: 13 MMOL/L (ref 9–17)
BACTERIA: NORMAL
BILIRUBIN URINE: NEGATIVE
BUN BLDV-MCNC: 18 MG/DL (ref 8–23)
BUN/CREAT BLD: ABNORMAL (ref 9–20)
CALCIUM SERPL-MCNC: 8.6 MG/DL (ref 8.6–10.4)
CASTS UA: NORMAL /LPF (ref 0–8)
CHLORIDE BLD-SCNC: 100 MMOL/L (ref 98–107)
CO2: 23 MMOL/L (ref 20–31)
COLOR: YELLOW
COMMENT UA: ABNORMAL
CREAT SERPL-MCNC: 1.07 MG/DL (ref 0.5–0.9)
CRYSTALS, UA: NORMAL /HPF
EPITHELIAL CELLS UA: NORMAL /HPF (ref 0–5)
ESTIMATED AVERAGE GLUCOSE: 151 MG/DL
GFR AFRICAN AMERICAN: >60 ML/MIN
GFR NON-AFRICAN AMERICAN: 51 ML/MIN
GFR SERPL CREATININE-BSD FRML MDRD: ABNORMAL ML/MIN/{1.73_M2}
GFR SERPL CREATININE-BSD FRML MDRD: ABNORMAL ML/MIN/{1.73_M2}
GLUCOSE BLD-MCNC: 210 MG/DL (ref 65–105)
GLUCOSE BLD-MCNC: 245 MG/DL (ref 65–105)
GLUCOSE BLD-MCNC: 261 MG/DL (ref 65–105)
GLUCOSE BLD-MCNC: 263 MG/DL (ref 65–105)
GLUCOSE BLD-MCNC: 273 MG/DL (ref 70–99)
GLUCOSE BLD-MCNC: 279 MG/DL (ref 65–105)
GLUCOSE BLD-MCNC: 317 MG/DL (ref 65–105)
GLUCOSE BLD-MCNC: 334 MG/DL (ref 65–105)
GLUCOSE URINE: ABNORMAL
HBA1C MFR BLD: 6.9 % (ref 4–6)
HCT VFR BLD CALC: 34.9 % (ref 36–46)
HEMOGLOBIN: 11.7 G/DL (ref 12–16)
KETONES, URINE: NEGATIVE
LEUKOCYTE ESTERASE, URINE: ABNORMAL
MAGNESIUM: 1.8 MG/DL (ref 1.6–2.6)
MCH RBC QN AUTO: 30.6 PG (ref 26–34)
MCHC RBC AUTO-ENTMCNC: 33.5 G/DL (ref 31–37)
MCV RBC AUTO: 91.6 FL (ref 80–100)
MUCUS: NORMAL
NITRITE, URINE: NEGATIVE
OTHER OBSERVATIONS UA: NORMAL
PDW BLD-RTO: 14.6 % (ref 12.5–15.4)
PH UA: 5.5 (ref 5–8)
PLATELET # BLD: 153 K/UL (ref 140–450)
PMV BLD AUTO: 9.7 FL (ref 6–12)
POTASSIUM SERPL-SCNC: 4.8 MMOL/L (ref 3.7–5.3)
PROTEIN UA: NEGATIVE
RBC # BLD: 3.81 M/UL (ref 4–5.2)
RBC UA: NORMAL /HPF (ref 0–4)
RENAL EPITHELIAL, UA: NORMAL /HPF
SODIUM BLD-SCNC: 136 MMOL/L (ref 135–144)
SPECIFIC GRAVITY UA: 1.01 (ref 1–1.03)
TRICHOMONAS: NORMAL
TROPONIN INTERP: NORMAL
TROPONIN T: <0.03 NG/ML
TURBIDITY: CLEAR
URINE HGB: NEGATIVE
UROBILINOGEN, URINE: NORMAL
WBC # BLD: 7.4 K/UL (ref 3.5–11)
WBC UA: NORMAL /HPF (ref 0–5)
YEAST: NORMAL

## 2017-07-08 PROCEDURE — 2580000003 HC RX 258: Performed by: INTERNAL MEDICINE

## 2017-07-08 PROCEDURE — 81001 URINALYSIS AUTO W/SCOPE: CPT

## 2017-07-08 PROCEDURE — 87086 URINE CULTURE/COLONY COUNT: CPT

## 2017-07-08 PROCEDURE — 6370000000 HC RX 637 (ALT 250 FOR IP): Performed by: INTERNAL MEDICINE

## 2017-07-08 PROCEDURE — G0378 HOSPITAL OBSERVATION PER HR: HCPCS

## 2017-07-08 PROCEDURE — 6370000000 HC RX 637 (ALT 250 FOR IP): Performed by: NURSE PRACTITIONER

## 2017-07-08 PROCEDURE — 82947 ASSAY GLUCOSE BLOOD QUANT: CPT

## 2017-07-08 PROCEDURE — 94660 CPAP INITIATION&MGMT: CPT

## 2017-07-08 PROCEDURE — 6360000002 HC RX W HCPCS: Performed by: INTERNAL MEDICINE

## 2017-07-08 PROCEDURE — 2060000000 HC ICU INTERMEDIATE R&B

## 2017-07-08 PROCEDURE — 99222 1ST HOSP IP/OBS MODERATE 55: CPT | Performed by: INTERNAL MEDICINE

## 2017-07-08 PROCEDURE — 36415 COLL VENOUS BLD VENIPUNCTURE: CPT

## 2017-07-08 PROCEDURE — 94640 AIRWAY INHALATION TREATMENT: CPT

## 2017-07-08 PROCEDURE — 80048 BASIC METABOLIC PNL TOTAL CA: CPT

## 2017-07-08 PROCEDURE — 2580000003 HC RX 258: Performed by: NURSE PRACTITIONER

## 2017-07-08 PROCEDURE — 84484 ASSAY OF TROPONIN QUANT: CPT

## 2017-07-08 PROCEDURE — 85027 COMPLETE CBC AUTOMATED: CPT

## 2017-07-08 RX ORDER — METHYLPREDNISOLONE SODIUM SUCCINATE 40 MG/ML
40 INJECTION, POWDER, LYOPHILIZED, FOR SOLUTION INTRAMUSCULAR; INTRAVENOUS EVERY 12 HOURS
Status: DISCONTINUED | OUTPATIENT
Start: 2017-07-08 | End: 2017-07-10 | Stop reason: HOSPADM

## 2017-07-08 RX ORDER — POTASSIUM CHLORIDE 7.45 MG/ML
10 INJECTION INTRAVENOUS PRN
Status: DISCONTINUED | OUTPATIENT
Start: 2017-07-08 | End: 2017-07-10 | Stop reason: HOSPADM

## 2017-07-08 RX ORDER — POTASSIUM CHLORIDE 20 MEQ/1
40 TABLET, EXTENDED RELEASE ORAL PRN
Status: DISCONTINUED | OUTPATIENT
Start: 2017-07-08 | End: 2017-07-10 | Stop reason: HOSPADM

## 2017-07-08 RX ORDER — AMOXICILLIN AND CLAVULANATE POTASSIUM 875; 125 MG/1; MG/1
1 TABLET, FILM COATED ORAL EVERY 12 HOURS SCHEDULED
Status: DISCONTINUED | OUTPATIENT
Start: 2017-07-08 | End: 2017-07-10 | Stop reason: HOSPADM

## 2017-07-08 RX ORDER — DEXTROSE AND SODIUM CHLORIDE 5; .45 G/100ML; G/100ML
INJECTION, SOLUTION INTRAVENOUS CONTINUOUS
Status: DISCONTINUED | OUTPATIENT
Start: 2017-07-08 | End: 2017-07-10 | Stop reason: HOSPADM

## 2017-07-08 RX ORDER — POTASSIUM CHLORIDE 20MEQ/15ML
40 LIQUID (ML) ORAL PRN
Status: DISCONTINUED | OUTPATIENT
Start: 2017-07-08 | End: 2017-07-10 | Stop reason: HOSPADM

## 2017-07-08 RX ORDER — MAGNESIUM SULFATE 1 G/100ML
1 INJECTION INTRAVENOUS PRN
Status: DISCONTINUED | OUTPATIENT
Start: 2017-07-08 | End: 2017-07-10 | Stop reason: HOSPADM

## 2017-07-08 RX ORDER — 0.9 % SODIUM CHLORIDE 0.9 %
500 INTRAVENOUS SOLUTION INTRAVENOUS ONCE
Status: COMPLETED | OUTPATIENT
Start: 2017-07-08 | End: 2017-07-08

## 2017-07-08 RX ORDER — OXYCODONE HYDROCHLORIDE AND ACETAMINOPHEN 5; 325 MG/1; MG/1
2 TABLET ORAL EVERY 6 HOURS PRN
Status: DISCONTINUED | OUTPATIENT
Start: 2017-07-08 | End: 2017-07-10 | Stop reason: HOSPADM

## 2017-07-08 RX ADMIN — MAGNESIUM OXIDE 400 MG: 400 TABLET ORAL at 21:38

## 2017-07-08 RX ADMIN — OXYCODONE HYDROCHLORIDE AND ACETAMINOPHEN 1 TABLET: 5; 325 TABLET ORAL at 03:39

## 2017-07-08 RX ADMIN — CITALOPRAM 40 MG: 20 TABLET, FILM COATED ORAL at 10:17

## 2017-07-08 RX ADMIN — INSULIN LISPRO 6 UNITS: 100 INJECTION, SOLUTION INTRAVENOUS; SUBCUTANEOUS at 14:35

## 2017-07-08 RX ADMIN — ATORVASTATIN CALCIUM 80 MG: 80 TABLET, FILM COATED ORAL at 20:15

## 2017-07-08 RX ADMIN — MAGNESIUM OXIDE 400 MG: 400 TABLET ORAL at 10:22

## 2017-07-08 RX ADMIN — PANTOPRAZOLE SODIUM 40 MG: 40 TABLET, DELAYED RELEASE ORAL at 10:17

## 2017-07-08 RX ADMIN — METHYLPREDNISOLONE SODIUM SUCCINATE 40 MG: 40 INJECTION, POWDER, FOR SOLUTION INTRAMUSCULAR; INTRAVENOUS at 23:43

## 2017-07-08 RX ADMIN — AMLODIPINE BESYLATE 5 MG: 5 TABLET ORAL at 10:17

## 2017-07-08 RX ADMIN — CARVEDILOL 3.12 MG: 3.12 TABLET, FILM COATED ORAL at 18:19

## 2017-07-08 RX ADMIN — Medication 10 MG: at 21:33

## 2017-07-08 RX ADMIN — INSULIN LISPRO 4 UNITS: 100 INJECTION, SOLUTION INTRAVENOUS; SUBCUTANEOUS at 10:49

## 2017-07-08 RX ADMIN — OXYCODONE HYDROCHLORIDE AND ACETAMINOPHEN 2 TABLET: 5; 325 TABLET ORAL at 23:43

## 2017-07-08 RX ADMIN — Medication 10 ML: at 21:34

## 2017-07-08 RX ADMIN — TOPIRAMATE 100 MG: 100 TABLET, FILM COATED ORAL at 20:15

## 2017-07-08 RX ADMIN — OXYCODONE HYDROCHLORIDE AND ACETAMINOPHEN 1 TABLET: 5; 325 TABLET ORAL at 10:18

## 2017-07-08 RX ADMIN — DOCUSATE SODIUM 100 MG: 100 CAPSULE ORAL at 20:15

## 2017-07-08 RX ADMIN — INSULIN LISPRO 4 UNITS: 100 INJECTION, SOLUTION INTRAVENOUS; SUBCUTANEOUS at 18:21

## 2017-07-08 RX ADMIN — MOMETASONE FUROATE AND FORMOTEROL FUMARATE DIHYDRATE 2 PUFF: 200; 5 AEROSOL RESPIRATORY (INHALATION) at 09:28

## 2017-07-08 RX ADMIN — AMOXICILLIN AND CLAVULANATE POTASSIUM 1 TABLET: 875; 125 TABLET, FILM COATED ORAL at 21:32

## 2017-07-08 RX ADMIN — INSULIN GLARGINE 50 UNITS: 100 INJECTION, SOLUTION SUBCUTANEOUS at 10:50

## 2017-07-08 RX ADMIN — DOCUSATE SODIUM 100 MG: 100 CAPSULE ORAL at 10:17

## 2017-07-08 RX ADMIN — PANTOPRAZOLE SODIUM 40 MG: 40 TABLET, DELAYED RELEASE ORAL at 18:19

## 2017-07-08 RX ADMIN — ISOSORBIDE MONONITRATE 30 MG: 30 TABLET ORAL at 10:17

## 2017-07-08 RX ADMIN — Medication 10 ML: at 10:23

## 2017-07-08 RX ADMIN — INSULIN LISPRO 10 UNITS: 100 INJECTION, SOLUTION INTRAVENOUS; SUBCUTANEOUS at 18:24

## 2017-07-08 RX ADMIN — CLOPIDOGREL 75 MG: 75 TABLET, FILM COATED ORAL at 10:17

## 2017-07-08 RX ADMIN — INSULIN LISPRO 6 UNITS: 100 INJECTION, SUSPENSION SUBCUTANEOUS at 04:18

## 2017-07-08 RX ADMIN — OXYCODONE HYDROCHLORIDE AND ACETAMINOPHEN 2 TABLET: 5; 325 TABLET ORAL at 17:26

## 2017-07-08 RX ADMIN — FERROUS SULFATE TAB EC 325 MG (65 MG FE EQUIVALENT) 325 MG: 325 (65 FE) TABLET DELAYED RESPONSE at 10:17

## 2017-07-08 RX ADMIN — MOMETASONE FUROATE AND FORMOTEROL FUMARATE DIHYDRATE 2 PUFF: 200; 5 AEROSOL RESPIRATORY (INHALATION) at 20:58

## 2017-07-08 RX ADMIN — INSULIN GLARGINE 40 UNITS: 100 INJECTION, SOLUTION SUBCUTANEOUS at 20:16

## 2017-07-08 RX ADMIN — SODIUM CHLORIDE 500 ML: 9 INJECTION, SOLUTION INTRAVENOUS at 01:27

## 2017-07-08 RX ADMIN — LOSARTAN POTASSIUM 25 MG: 25 TABLET, FILM COATED ORAL at 10:17

## 2017-07-08 RX ADMIN — METHYLPREDNISOLONE SODIUM SUCCINATE 40 MG: 40 INJECTION, POWDER, FOR SOLUTION INTRAMUSCULAR; INTRAVENOUS at 13:16

## 2017-07-08 RX ADMIN — CARVEDILOL 3.12 MG: 3.12 TABLET, FILM COATED ORAL at 10:16

## 2017-07-08 RX ADMIN — INSULIN LISPRO 4 UNITS: 100 INJECTION, SOLUTION INTRAVENOUS; SUBCUTANEOUS at 20:16

## 2017-07-08 ASSESSMENT — PAIN SCALES - GENERAL
PAINLEVEL_OUTOF10: 7
PAINLEVEL_OUTOF10: 4
PAINLEVEL_OUTOF10: 7
PAINLEVEL_OUTOF10: 7
PAINLEVEL_OUTOF10: 6
PAINLEVEL_OUTOF10: 7
PAINLEVEL_OUTOF10: 7
PAINLEVEL_OUTOF10: 6

## 2017-07-08 ASSESSMENT — PAIN DESCRIPTION - ONSET
ONSET: ON-GOING
ONSET: ON-GOING

## 2017-07-08 ASSESSMENT — PAIN DESCRIPTION - DESCRIPTORS
DESCRIPTORS: ACHING
DESCRIPTORS: ACHING

## 2017-07-08 ASSESSMENT — PAIN DESCRIPTION - ORIENTATION
ORIENTATION: LOWER;LEFT
ORIENTATION: LOWER;LEFT

## 2017-07-08 ASSESSMENT — PAIN DESCRIPTION - LOCATION
LOCATION: CHEST
LOCATION: CHEST

## 2017-07-08 ASSESSMENT — PAIN DESCRIPTION - FREQUENCY
FREQUENCY: CONTINUOUS
FREQUENCY: CONTINUOUS

## 2017-07-09 LAB
ANION GAP SERPL CALCULATED.3IONS-SCNC: 13 MMOL/L (ref 9–17)
BUN BLDV-MCNC: 30 MG/DL (ref 8–23)
BUN/CREAT BLD: ABNORMAL (ref 9–20)
CALCIUM SERPL-MCNC: 8.4 MG/DL (ref 8.6–10.4)
CHLORIDE BLD-SCNC: 99 MMOL/L (ref 98–107)
CO2: 23 MMOL/L (ref 20–31)
CREAT SERPL-MCNC: 1.03 MG/DL (ref 0.5–0.9)
CULTURE: NORMAL
CULTURE: NORMAL
GFR AFRICAN AMERICAN: >60 ML/MIN
GFR NON-AFRICAN AMERICAN: 53 ML/MIN
GFR SERPL CREATININE-BSD FRML MDRD: ABNORMAL ML/MIN/{1.73_M2}
GFR SERPL CREATININE-BSD FRML MDRD: ABNORMAL ML/MIN/{1.73_M2}
GLUCOSE BLD-MCNC: 283 MG/DL (ref 65–105)
GLUCOSE BLD-MCNC: 293 MG/DL (ref 70–99)
GLUCOSE BLD-MCNC: 312 MG/DL (ref 65–105)
GLUCOSE BLD-MCNC: 330 MG/DL (ref 65–105)
GLUCOSE BLD-MCNC: 377 MG/DL (ref 65–105)
Lab: NORMAL
POTASSIUM SERPL-SCNC: 5.1 MMOL/L (ref 3.7–5.3)
SODIUM BLD-SCNC: 135 MMOL/L (ref 135–144)
SPECIMEN DESCRIPTION: NORMAL
STATUS: NORMAL

## 2017-07-09 PROCEDURE — 99232 SBSQ HOSP IP/OBS MODERATE 35: CPT | Performed by: INTERNAL MEDICINE

## 2017-07-09 PROCEDURE — G0378 HOSPITAL OBSERVATION PER HR: HCPCS

## 2017-07-09 PROCEDURE — 94660 CPAP INITIATION&MGMT: CPT

## 2017-07-09 PROCEDURE — 36415 COLL VENOUS BLD VENIPUNCTURE: CPT

## 2017-07-09 PROCEDURE — 2060000000 HC ICU INTERMEDIATE R&B

## 2017-07-09 PROCEDURE — 80048 BASIC METABOLIC PNL TOTAL CA: CPT

## 2017-07-09 PROCEDURE — 6370000000 HC RX 637 (ALT 250 FOR IP): Performed by: NURSE PRACTITIONER

## 2017-07-09 PROCEDURE — 2580000003 HC RX 258: Performed by: INTERNAL MEDICINE

## 2017-07-09 PROCEDURE — 6370000000 HC RX 637 (ALT 250 FOR IP): Performed by: INTERNAL MEDICINE

## 2017-07-09 PROCEDURE — 82947 ASSAY GLUCOSE BLOOD QUANT: CPT

## 2017-07-09 PROCEDURE — 94640 AIRWAY INHALATION TREATMENT: CPT

## 2017-07-09 PROCEDURE — 6360000002 HC RX W HCPCS: Performed by: INTERNAL MEDICINE

## 2017-07-09 RX ADMIN — INSULIN LISPRO 10 UNITS: 100 INJECTION, SOLUTION INTRAVENOUS; SUBCUTANEOUS at 17:59

## 2017-07-09 RX ADMIN — AMOXICILLIN AND CLAVULANATE POTASSIUM 1 TABLET: 875; 125 TABLET, FILM COATED ORAL at 20:41

## 2017-07-09 RX ADMIN — MOMETASONE FUROATE AND FORMOTEROL FUMARATE DIHYDRATE 2 PUFF: 200; 5 AEROSOL RESPIRATORY (INHALATION) at 21:13

## 2017-07-09 RX ADMIN — CITALOPRAM 40 MG: 20 TABLET, FILM COATED ORAL at 09:54

## 2017-07-09 RX ADMIN — OXYCODONE HYDROCHLORIDE AND ACETAMINOPHEN 2 TABLET: 5; 325 TABLET ORAL at 05:38

## 2017-07-09 RX ADMIN — METHYLPREDNISOLONE SODIUM SUCCINATE 40 MG: 40 INJECTION, POWDER, FOR SOLUTION INTRAMUSCULAR; INTRAVENOUS at 12:11

## 2017-07-09 RX ADMIN — OXYCODONE HYDROCHLORIDE AND ACETAMINOPHEN 2 TABLET: 5; 325 TABLET ORAL at 17:58

## 2017-07-09 RX ADMIN — TOPIRAMATE 100 MG: 100 TABLET, FILM COATED ORAL at 21:59

## 2017-07-09 RX ADMIN — INSULIN GLARGINE 50 UNITS: 100 INJECTION, SOLUTION SUBCUTANEOUS at 09:55

## 2017-07-09 RX ADMIN — AMLODIPINE BESYLATE 5 MG: 5 TABLET ORAL at 09:53

## 2017-07-09 RX ADMIN — LOSARTAN POTASSIUM 25 MG: 25 TABLET, FILM COATED ORAL at 09:54

## 2017-07-09 RX ADMIN — ATORVASTATIN CALCIUM 80 MG: 80 TABLET, FILM COATED ORAL at 20:41

## 2017-07-09 RX ADMIN — INSULIN LISPRO 5 UNITS: 100 INJECTION, SOLUTION INTRAVENOUS; SUBCUTANEOUS at 20:40

## 2017-07-09 RX ADMIN — Medication 10 MG: at 20:41

## 2017-07-09 RX ADMIN — AMOXICILLIN AND CLAVULANATE POTASSIUM 1 TABLET: 875; 125 TABLET, FILM COATED ORAL at 09:53

## 2017-07-09 RX ADMIN — CARVEDILOL 3.12 MG: 3.12 TABLET, FILM COATED ORAL at 09:52

## 2017-07-09 RX ADMIN — DOCUSATE SODIUM 100 MG: 100 CAPSULE ORAL at 20:41

## 2017-07-09 RX ADMIN — MAGNESIUM OXIDE 400 MG: 400 TABLET ORAL at 09:55

## 2017-07-09 RX ADMIN — FERROUS SULFATE TAB EC 325 MG (65 MG FE EQUIVALENT) 325 MG: 325 (65 FE) TABLET DELAYED RESPONSE at 09:53

## 2017-07-09 RX ADMIN — MAGNESIUM OXIDE 400 MG: 400 TABLET ORAL at 20:49

## 2017-07-09 RX ADMIN — DOCUSATE SODIUM 100 MG: 100 CAPSULE ORAL at 09:53

## 2017-07-09 RX ADMIN — Medication 10 ML: at 20:47

## 2017-07-09 RX ADMIN — INSULIN GLARGINE 40 UNITS: 100 INJECTION, SOLUTION SUBCUTANEOUS at 20:41

## 2017-07-09 RX ADMIN — INSULIN LISPRO 8 UNITS: 100 INJECTION, SOLUTION INTRAVENOUS; SUBCUTANEOUS at 17:58

## 2017-07-09 RX ADMIN — INSULIN LISPRO 6 UNITS: 100 INJECTION, SOLUTION INTRAVENOUS; SUBCUTANEOUS at 09:55

## 2017-07-09 RX ADMIN — OXYCODONE HYDROCHLORIDE AND ACETAMINOPHEN 2 TABLET: 5; 325 TABLET ORAL at 12:11

## 2017-07-09 RX ADMIN — INSULIN LISPRO 8 UNITS: 100 INJECTION, SOLUTION INTRAVENOUS; SUBCUTANEOUS at 12:54

## 2017-07-09 RX ADMIN — CARVEDILOL 3.12 MG: 3.12 TABLET, FILM COATED ORAL at 17:28

## 2017-07-09 RX ADMIN — PANTOPRAZOLE SODIUM 40 MG: 40 TABLET, DELAYED RELEASE ORAL at 17:58

## 2017-07-09 RX ADMIN — Medication 10 ML: at 09:54

## 2017-07-09 RX ADMIN — MOMETASONE FUROATE AND FORMOTEROL FUMARATE DIHYDRATE 2 PUFF: 200; 5 AEROSOL RESPIRATORY (INHALATION) at 09:17

## 2017-07-09 RX ADMIN — CLOPIDOGREL 75 MG: 75 TABLET, FILM COATED ORAL at 09:53

## 2017-07-09 RX ADMIN — PANTOPRAZOLE SODIUM 40 MG: 40 TABLET, DELAYED RELEASE ORAL at 09:53

## 2017-07-09 RX ADMIN — ISOSORBIDE MONONITRATE 30 MG: 30 TABLET ORAL at 09:53

## 2017-07-09 ASSESSMENT — PAIN DESCRIPTION - DESCRIPTORS: DESCRIPTORS: ACHING

## 2017-07-09 ASSESSMENT — PAIN SCALES - GENERAL
PAINLEVEL_OUTOF10: 6
PAINLEVEL_OUTOF10: 6
PAINLEVEL_OUTOF10: 0
PAINLEVEL_OUTOF10: 7
PAINLEVEL_OUTOF10: 6
PAINLEVEL_OUTOF10: 5
PAINLEVEL_OUTOF10: 7

## 2017-07-09 ASSESSMENT — PAIN DESCRIPTION - ORIENTATION: ORIENTATION: LOWER;LEFT

## 2017-07-09 ASSESSMENT — PAIN DESCRIPTION - PAIN TYPE: TYPE: ACUTE PAIN

## 2017-07-09 ASSESSMENT — PAIN DESCRIPTION - LOCATION: LOCATION: CHEST

## 2017-07-10 VITALS
HEIGHT: 62 IN | RESPIRATION RATE: 17 BRPM | SYSTOLIC BLOOD PRESSURE: 141 MMHG | DIASTOLIC BLOOD PRESSURE: 63 MMHG | TEMPERATURE: 98.3 F | BODY MASS INDEX: 45.64 KG/M2 | OXYGEN SATURATION: 97 % | HEART RATE: 65 BPM | WEIGHT: 248 LBS

## 2017-07-10 LAB
ANION GAP SERPL CALCULATED.3IONS-SCNC: 11 MMOL/L (ref 9–17)
BUN BLDV-MCNC: 30 MG/DL (ref 8–23)
BUN/CREAT BLD: ABNORMAL (ref 9–20)
CALCIUM SERPL-MCNC: 8.6 MG/DL (ref 8.6–10.4)
CHLORIDE BLD-SCNC: 103 MMOL/L (ref 98–107)
CO2: 24 MMOL/L (ref 20–31)
CREAT SERPL-MCNC: 0.88 MG/DL (ref 0.5–0.9)
GFR AFRICAN AMERICAN: >60 ML/MIN
GFR NON-AFRICAN AMERICAN: >60 ML/MIN
GFR SERPL CREATININE-BSD FRML MDRD: ABNORMAL ML/MIN/{1.73_M2}
GFR SERPL CREATININE-BSD FRML MDRD: ABNORMAL ML/MIN/{1.73_M2}
GLUCOSE BLD-MCNC: 198 MG/DL (ref 65–105)
GLUCOSE BLD-MCNC: 204 MG/DL (ref 70–99)
POTASSIUM SERPL-SCNC: 4.9 MMOL/L (ref 3.7–5.3)
SODIUM BLD-SCNC: 138 MMOL/L (ref 135–144)

## 2017-07-10 PROCEDURE — 94640 AIRWAY INHALATION TREATMENT: CPT

## 2017-07-10 PROCEDURE — 6360000002 HC RX W HCPCS: Performed by: INTERNAL MEDICINE

## 2017-07-10 PROCEDURE — 2580000003 HC RX 258: Performed by: INTERNAL MEDICINE

## 2017-07-10 PROCEDURE — 99239 HOSP IP/OBS DSCHRG MGMT >30: CPT | Performed by: INTERNAL MEDICINE

## 2017-07-10 PROCEDURE — 6370000000 HC RX 637 (ALT 250 FOR IP): Performed by: NURSE PRACTITIONER

## 2017-07-10 PROCEDURE — 6370000000 HC RX 637 (ALT 250 FOR IP): Performed by: INTERNAL MEDICINE

## 2017-07-10 PROCEDURE — 36415 COLL VENOUS BLD VENIPUNCTURE: CPT

## 2017-07-10 PROCEDURE — G0378 HOSPITAL OBSERVATION PER HR: HCPCS

## 2017-07-10 PROCEDURE — 80048 BASIC METABOLIC PNL TOTAL CA: CPT

## 2017-07-10 PROCEDURE — 82947 ASSAY GLUCOSE BLOOD QUANT: CPT

## 2017-07-10 RX ORDER — AMOXICILLIN AND CLAVULANATE POTASSIUM 875; 125 MG/1; MG/1
1 TABLET, FILM COATED ORAL EVERY 12 HOURS SCHEDULED
Qty: 8 TABLET | Refills: 0 | Status: SHIPPED | OUTPATIENT
Start: 2017-07-10 | End: 2017-07-14

## 2017-07-10 RX ORDER — PREDNISONE 20 MG/1
TABLET ORAL
Qty: 6 TABLET | Refills: 0 | Status: SHIPPED | OUTPATIENT
Start: 2017-07-10 | End: 2017-07-26 | Stop reason: ALTCHOICE

## 2017-07-10 RX ADMIN — AMLODIPINE BESYLATE 5 MG: 5 TABLET ORAL at 08:30

## 2017-07-10 RX ADMIN — INSULIN GLARGINE 50 UNITS: 100 INJECTION, SOLUTION SUBCUTANEOUS at 10:18

## 2017-07-10 RX ADMIN — METHYLPREDNISOLONE SODIUM SUCCINATE 40 MG: 40 INJECTION, POWDER, FOR SOLUTION INTRAMUSCULAR; INTRAVENOUS at 00:21

## 2017-07-10 RX ADMIN — DOCUSATE SODIUM 100 MG: 100 CAPSULE ORAL at 08:30

## 2017-07-10 RX ADMIN — ISOSORBIDE MONONITRATE 30 MG: 30 TABLET ORAL at 08:30

## 2017-07-10 RX ADMIN — CITALOPRAM 40 MG: 20 TABLET, FILM COATED ORAL at 08:30

## 2017-07-10 RX ADMIN — AMOXICILLIN AND CLAVULANATE POTASSIUM 1 TABLET: 875; 125 TABLET, FILM COATED ORAL at 08:30

## 2017-07-10 RX ADMIN — OXYCODONE HYDROCHLORIDE AND ACETAMINOPHEN 2 TABLET: 5; 325 TABLET ORAL at 07:44

## 2017-07-10 RX ADMIN — OXYCODONE HYDROCHLORIDE AND ACETAMINOPHEN 2 TABLET: 5; 325 TABLET ORAL at 00:21

## 2017-07-10 RX ADMIN — PANTOPRAZOLE SODIUM 40 MG: 40 TABLET, DELAYED RELEASE ORAL at 06:28

## 2017-07-10 RX ADMIN — MOMETASONE FUROATE AND FORMOTEROL FUMARATE DIHYDRATE 2 PUFF: 200; 5 AEROSOL RESPIRATORY (INHALATION) at 09:11

## 2017-07-10 RX ADMIN — CLOPIDOGREL 75 MG: 75 TABLET, FILM COATED ORAL at 08:30

## 2017-07-10 RX ADMIN — MAGNESIUM OXIDE 400 MG: 400 TABLET ORAL at 08:31

## 2017-07-10 RX ADMIN — FERROUS SULFATE TAB EC 325 MG (65 MG FE EQUIVALENT) 325 MG: 325 (65 FE) TABLET DELAYED RESPONSE at 08:30

## 2017-07-10 RX ADMIN — Medication 10 ML: at 08:31

## 2017-07-10 RX ADMIN — CARVEDILOL 3.12 MG: 3.12 TABLET, FILM COATED ORAL at 08:30

## 2017-07-10 RX ADMIN — INSULIN LISPRO 2 UNITS: 100 INJECTION, SOLUTION INTRAVENOUS; SUBCUTANEOUS at 10:18

## 2017-07-10 RX ADMIN — LOSARTAN POTASSIUM 25 MG: 25 TABLET, FILM COATED ORAL at 08:30

## 2017-07-10 RX ADMIN — OXYCODONE HYDROCHLORIDE AND ACETAMINOPHEN 2 TABLET: 5; 325 TABLET ORAL at 14:20

## 2017-07-10 ASSESSMENT — PAIN SCALES - GENERAL
PAINLEVEL_OUTOF10: 5
PAINLEVEL_OUTOF10: 7
PAINLEVEL_OUTOF10: 6
PAINLEVEL_OUTOF10: 7
PAINLEVEL_OUTOF10: 0

## 2017-07-11 LAB
EKG ATRIAL RATE: 68 BPM
EKG P AXIS: 55 DEGREES
EKG P-R INTERVAL: 128 MS
EKG Q-T INTERVAL: 408 MS
EKG QRS DURATION: 66 MS
EKG QTC CALCULATION (BAZETT): 433 MS
EKG R AXIS: -3 DEGREES
EKG T AXIS: 70 DEGREES
EKG VENTRICULAR RATE: 68 BPM

## 2017-07-12 ENCOUNTER — HOSPITAL ENCOUNTER (INPATIENT)
Age: 67
LOS: 1 days | Discharge: HOME HEALTH | DRG: 638 | End: 2017-07-13
Attending: EMERGENCY MEDICINE | Admitting: FAMILY MEDICINE
Payer: COMMERCIAL

## 2017-07-12 ENCOUNTER — APPOINTMENT (OUTPATIENT)
Dept: MRI IMAGING | Age: 67
DRG: 638 | End: 2017-07-12
Payer: COMMERCIAL

## 2017-07-12 ENCOUNTER — APPOINTMENT (OUTPATIENT)
Dept: GENERAL RADIOLOGY | Age: 67
DRG: 638 | End: 2017-07-12
Payer: COMMERCIAL

## 2017-07-12 ENCOUNTER — APPOINTMENT (OUTPATIENT)
Dept: CT IMAGING | Age: 67
DRG: 638 | End: 2017-07-12
Payer: COMMERCIAL

## 2017-07-12 DIAGNOSIS — R53.1 LEFT-SIDED WEAKNESS: ICD-10-CM

## 2017-07-12 DIAGNOSIS — R07.89 OTHER CHEST PAIN: Primary | ICD-10-CM

## 2017-07-12 DIAGNOSIS — R06.02 SOB (SHORTNESS OF BREATH): ICD-10-CM

## 2017-07-12 LAB
ABSOLUTE EOS #: 0.1 K/UL (ref 0–0.4)
ABSOLUTE LYMPH #: 1.6 K/UL (ref 1–4.8)
ABSOLUTE MONO #: 0.8 K/UL (ref 0.1–1.2)
ANION GAP SERPL CALCULATED.3IONS-SCNC: 13 MMOL/L (ref 9–17)
BASOPHILS # BLD: 1 %
BASOPHILS ABSOLUTE: 0.1 K/UL (ref 0–0.2)
BUN BLDV-MCNC: 27 MG/DL (ref 8–23)
BUN/CREAT BLD: ABNORMAL (ref 9–20)
CALCIUM SERPL-MCNC: 8.7 MG/DL (ref 8.6–10.4)
CHLORIDE BLD-SCNC: 102 MMOL/L (ref 98–107)
CO2: 24 MMOL/L (ref 20–31)
CREAT SERPL-MCNC: 0.85 MG/DL (ref 0.5–0.9)
D-DIMER QUANTITATIVE: 0.44 MG/L FEU
DIFFERENTIAL TYPE: ABNORMAL
EOSINOPHILS RELATIVE PERCENT: 1 %
GFR AFRICAN AMERICAN: >60 ML/MIN
GFR NON-AFRICAN AMERICAN: >60 ML/MIN
GFR SERPL CREATININE-BSD FRML MDRD: ABNORMAL ML/MIN/{1.73_M2}
GFR SERPL CREATININE-BSD FRML MDRD: ABNORMAL ML/MIN/{1.73_M2}
GLUCOSE BLD-MCNC: 104 MG/DL (ref 65–105)
GLUCOSE BLD-MCNC: 167 MG/DL (ref 65–105)
GLUCOSE BLD-MCNC: 37 MG/DL (ref 65–105)
GLUCOSE BLD-MCNC: 95 MG/DL (ref 70–99)
HCT VFR BLD CALC: 38.6 % (ref 36–46)
HEMOGLOBIN: 12.5 G/DL (ref 12–16)
LYMPHOCYTES # BLD: 15 %
MCH RBC QN AUTO: 29.9 PG (ref 26–34)
MCHC RBC AUTO-ENTMCNC: 32.3 G/DL (ref 31–37)
MCV RBC AUTO: 92.5 FL (ref 80–100)
MONOCYTES # BLD: 7 %
PDW BLD-RTO: 15 % (ref 12.5–15.4)
PLATELET # BLD: 202 K/UL (ref 140–450)
PLATELET ESTIMATE: ABNORMAL
PMV BLD AUTO: 9.4 FL (ref 6–12)
POC TROPONIN I: 0 NG/ML (ref 0–0.1)
POC TROPONIN I: 0 NG/ML (ref 0–0.1)
POC TROPONIN INTERP: NORMAL
POC TROPONIN INTERP: NORMAL
POTASSIUM SERPL-SCNC: 3.8 MMOL/L (ref 3.7–5.3)
RBC # BLD: 4.17 M/UL (ref 4–5.2)
RBC # BLD: ABNORMAL 10*6/UL
SEG NEUTROPHILS: 76 %
SEGMENTED NEUTROPHILS ABSOLUTE COUNT: 8 K/UL (ref 1.8–7.7)
SODIUM BLD-SCNC: 139 MMOL/L (ref 135–144)
WBC # BLD: 10.6 K/UL (ref 3.5–11)
WBC # BLD: ABNORMAL 10*3/UL

## 2017-07-12 PROCEDURE — 6370000000 HC RX 637 (ALT 250 FOR IP): Performed by: EMERGENCY MEDICINE

## 2017-07-12 PROCEDURE — 6370000000 HC RX 637 (ALT 250 FOR IP): Performed by: FAMILY MEDICINE

## 2017-07-12 PROCEDURE — 99285 EMERGENCY DEPT VISIT HI MDM: CPT

## 2017-07-12 PROCEDURE — 71020 XR CHEST STANDARD TWO VW: CPT

## 2017-07-12 PROCEDURE — 84484 ASSAY OF TROPONIN QUANT: CPT

## 2017-07-12 PROCEDURE — 70551 MRI BRAIN STEM W/O DYE: CPT

## 2017-07-12 PROCEDURE — 1200000000 HC SEMI PRIVATE

## 2017-07-12 PROCEDURE — 70450 CT HEAD/BRAIN W/O DYE: CPT

## 2017-07-12 PROCEDURE — 2580000003 HC RX 258: Performed by: FAMILY MEDICINE

## 2017-07-12 PROCEDURE — 80048 BASIC METABOLIC PNL TOTAL CA: CPT

## 2017-07-12 PROCEDURE — 82947 ASSAY GLUCOSE BLOOD QUANT: CPT

## 2017-07-12 PROCEDURE — 85025 COMPLETE CBC W/AUTO DIFF WBC: CPT

## 2017-07-12 PROCEDURE — 85379 FIBRIN DEGRADATION QUANT: CPT

## 2017-07-12 PROCEDURE — 6360000002 HC RX W HCPCS: Performed by: FAMILY MEDICINE

## 2017-07-12 PROCEDURE — 94640 AIRWAY INHALATION TREATMENT: CPT

## 2017-07-12 PROCEDURE — 99222 1ST HOSP IP/OBS MODERATE 55: CPT | Performed by: NURSE PRACTITIONER

## 2017-07-12 PROCEDURE — 93005 ELECTROCARDIOGRAM TRACING: CPT

## 2017-07-12 RX ORDER — DEXTROSE MONOHYDRATE 25 G/50ML
12.5 INJECTION, SOLUTION INTRAVENOUS PRN
Status: DISCONTINUED | OUTPATIENT
Start: 2017-07-12 | End: 2017-07-13 | Stop reason: HOSPADM

## 2017-07-12 RX ORDER — PREDNISONE 20 MG/1
40 TABLET ORAL DAILY
Status: DISCONTINUED | OUTPATIENT
Start: 2017-07-12 | End: 2017-07-13 | Stop reason: HOSPADM

## 2017-07-12 RX ORDER — TOPIRAMATE 100 MG/1
100 TABLET, FILM COATED ORAL NIGHTLY
Status: DISCONTINUED | OUTPATIENT
Start: 2017-07-12 | End: 2017-07-13 | Stop reason: HOSPADM

## 2017-07-12 RX ORDER — MAGNESIUM OXIDE 400 MG/1
400 TABLET ORAL 2 TIMES DAILY
Status: DISCONTINUED | OUTPATIENT
Start: 2017-07-12 | End: 2017-07-12

## 2017-07-12 RX ORDER — DOCUSATE SODIUM 100 MG/1
100 CAPSULE, LIQUID FILLED ORAL 2 TIMES DAILY
Status: DISCONTINUED | OUTPATIENT
Start: 2017-07-12 | End: 2017-07-13 | Stop reason: HOSPADM

## 2017-07-12 RX ORDER — POTASSIUM CHLORIDE 20MEQ/15ML
40 LIQUID (ML) ORAL PRN
Status: DISCONTINUED | OUTPATIENT
Start: 2017-07-12 | End: 2017-07-13 | Stop reason: HOSPADM

## 2017-07-12 RX ORDER — MAGNESIUM SULFATE 1 G/100ML
1 INJECTION INTRAVENOUS PRN
Status: DISCONTINUED | OUTPATIENT
Start: 2017-07-12 | End: 2017-07-13 | Stop reason: HOSPADM

## 2017-07-12 RX ORDER — AMOXICILLIN AND CLAVULANATE POTASSIUM 875; 125 MG/1; MG/1
1 TABLET, FILM COATED ORAL EVERY 12 HOURS SCHEDULED
Status: DISCONTINUED | OUTPATIENT
Start: 2017-07-12 | End: 2017-07-13 | Stop reason: HOSPADM

## 2017-07-12 RX ORDER — DEXTROSE MONOHYDRATE 50 MG/ML
100 INJECTION, SOLUTION INTRAVENOUS PRN
Status: DISCONTINUED | OUTPATIENT
Start: 2017-07-12 | End: 2017-07-13 | Stop reason: HOSPADM

## 2017-07-12 RX ORDER — SODIUM CHLORIDE 9 MG/ML
INJECTION, SOLUTION INTRAVENOUS CONTINUOUS
Status: DISCONTINUED | OUTPATIENT
Start: 2017-07-12 | End: 2017-07-13

## 2017-07-12 RX ORDER — OXYCODONE HYDROCHLORIDE AND ACETAMINOPHEN 5; 325 MG/1; MG/1
1 TABLET ORAL EVERY 6 HOURS PRN
Status: DISCONTINUED | OUTPATIENT
Start: 2017-07-12 | End: 2017-07-13 | Stop reason: HOSPADM

## 2017-07-12 RX ORDER — CARVEDILOL 3.12 MG/1
3.12 TABLET ORAL 2 TIMES DAILY WITH MEALS
Status: DISCONTINUED | OUTPATIENT
Start: 2017-07-12 | End: 2017-07-13 | Stop reason: HOSPADM

## 2017-07-12 RX ORDER — NICOTINE POLACRILEX 4 MG
15 LOZENGE BUCCAL PRN
Status: DISCONTINUED | OUTPATIENT
Start: 2017-07-12 | End: 2017-07-13 | Stop reason: HOSPADM

## 2017-07-12 RX ORDER — INSULIN GLARGINE 100 [IU]/ML
45 INJECTION, SOLUTION SUBCUTANEOUS 2 TIMES DAILY
Status: DISCONTINUED | OUTPATIENT
Start: 2017-07-12 | End: 2017-07-13

## 2017-07-12 RX ORDER — ONDANSETRON 2 MG/ML
4 INJECTION INTRAMUSCULAR; INTRAVENOUS EVERY 6 HOURS PRN
Status: DISCONTINUED | OUTPATIENT
Start: 2017-07-12 | End: 2017-07-13 | Stop reason: HOSPADM

## 2017-07-12 RX ORDER — LANOLIN ALCOHOL/MO/W.PET/CERES
325 CREAM (GRAM) TOPICAL
Status: DISCONTINUED | OUTPATIENT
Start: 2017-07-13 | End: 2017-07-13 | Stop reason: HOSPADM

## 2017-07-12 RX ORDER — FUROSEMIDE 20 MG/1
20 TABLET ORAL PRN
Status: DISCONTINUED | OUTPATIENT
Start: 2017-07-12 | End: 2017-07-13

## 2017-07-12 RX ORDER — SODIUM CHLORIDE 0.9 % (FLUSH) 0.9 %
10 SYRINGE (ML) INJECTION PRN
Status: DISCONTINUED | OUTPATIENT
Start: 2017-07-12 | End: 2017-07-13 | Stop reason: HOSPADM

## 2017-07-12 RX ORDER — ISOSORBIDE MONONITRATE 30 MG/1
30 TABLET, EXTENDED RELEASE ORAL DAILY
Status: DISCONTINUED | OUTPATIENT
Start: 2017-07-12 | End: 2017-07-13 | Stop reason: HOSPADM

## 2017-07-12 RX ORDER — POTASSIUM CHLORIDE 20 MEQ/1
40 TABLET, EXTENDED RELEASE ORAL PRN
Status: DISCONTINUED | OUTPATIENT
Start: 2017-07-12 | End: 2017-07-13 | Stop reason: HOSPADM

## 2017-07-12 RX ORDER — ALBUTEROL SULFATE 2.5 MG/3ML
2.5 SOLUTION RESPIRATORY (INHALATION) EVERY 6 HOURS PRN
Status: DISCONTINUED | OUTPATIENT
Start: 2017-07-12 | End: 2017-07-13 | Stop reason: HOSPADM

## 2017-07-12 RX ORDER — ASPIRIN 81 MG/1
324 TABLET, CHEWABLE ORAL ONCE
Status: COMPLETED | OUTPATIENT
Start: 2017-07-12 | End: 2017-07-12

## 2017-07-12 RX ORDER — ERGOCALCIFEROL 1.25 MG/1
50000 CAPSULE ORAL WEEKLY
Status: DISCONTINUED | OUTPATIENT
Start: 2017-07-12 | End: 2017-07-13 | Stop reason: HOSPADM

## 2017-07-12 RX ORDER — CLOPIDOGREL BISULFATE 75 MG/1
75 TABLET ORAL DAILY
Status: DISCONTINUED | OUTPATIENT
Start: 2017-07-12 | End: 2017-07-13 | Stop reason: HOSPADM

## 2017-07-12 RX ORDER — POTASSIUM CHLORIDE 7.45 MG/ML
10 INJECTION INTRAVENOUS PRN
Status: DISCONTINUED | OUTPATIENT
Start: 2017-07-12 | End: 2017-07-13 | Stop reason: HOSPADM

## 2017-07-12 RX ORDER — ATORVASTATIN CALCIUM 80 MG/1
80 TABLET, FILM COATED ORAL NIGHTLY
Status: DISCONTINUED | OUTPATIENT
Start: 2017-07-12 | End: 2017-07-13 | Stop reason: HOSPADM

## 2017-07-12 RX ORDER — UREA 10 %
10 LOTION (ML) TOPICAL NIGHTLY
Status: DISCONTINUED | OUTPATIENT
Start: 2017-07-12 | End: 2017-07-13 | Stop reason: HOSPADM

## 2017-07-12 RX ORDER — AMLODIPINE BESYLATE 5 MG/1
5 TABLET ORAL DAILY
Status: DISCONTINUED | OUTPATIENT
Start: 2017-07-12 | End: 2017-07-13 | Stop reason: HOSPADM

## 2017-07-12 RX ORDER — IPRATROPIUM BROMIDE AND ALBUTEROL SULFATE 2.5; .5 MG/3ML; MG/3ML
1 SOLUTION RESPIRATORY (INHALATION) EVERY 4 HOURS PRN
Status: DISCONTINUED | OUTPATIENT
Start: 2017-07-12 | End: 2017-07-13 | Stop reason: HOSPADM

## 2017-07-12 RX ORDER — CITALOPRAM 20 MG/1
20 TABLET ORAL DAILY
Status: DISCONTINUED | OUTPATIENT
Start: 2017-07-12 | End: 2017-07-13 | Stop reason: HOSPADM

## 2017-07-12 RX ORDER — LOSARTAN POTASSIUM 25 MG/1
25 TABLET ORAL DAILY
Status: DISCONTINUED | OUTPATIENT
Start: 2017-07-12 | End: 2017-07-13 | Stop reason: HOSPADM

## 2017-07-12 RX ORDER — PANTOPRAZOLE SODIUM 40 MG/1
40 TABLET, DELAYED RELEASE ORAL 2 TIMES DAILY
Status: DISCONTINUED | OUTPATIENT
Start: 2017-07-12 | End: 2017-07-13 | Stop reason: HOSPADM

## 2017-07-12 RX ORDER — SODIUM CHLORIDE 0.9 % (FLUSH) 0.9 %
10 SYRINGE (ML) INJECTION EVERY 12 HOURS SCHEDULED
Status: DISCONTINUED | OUTPATIENT
Start: 2017-07-12 | End: 2017-07-13 | Stop reason: HOSPADM

## 2017-07-12 RX ORDER — DOCUSATE SODIUM 100 MG/1
100 CAPSULE, LIQUID FILLED ORAL 2 TIMES DAILY PRN
Status: DISCONTINUED | OUTPATIENT
Start: 2017-07-12 | End: 2017-07-13 | Stop reason: HOSPADM

## 2017-07-12 RX ORDER — ACETAMINOPHEN 325 MG/1
650 TABLET ORAL EVERY 4 HOURS PRN
Status: DISCONTINUED | OUTPATIENT
Start: 2017-07-12 | End: 2017-07-13 | Stop reason: HOSPADM

## 2017-07-12 RX ADMIN — AMOXICILLIN AND CLAVULANATE POTASSIUM 1 TABLET: 875; 125 TABLET, FILM COATED ORAL at 20:02

## 2017-07-12 RX ADMIN — DOCUSATE SODIUM 100 MG: 100 CAPSULE ORAL at 20:02

## 2017-07-12 RX ADMIN — INSULIN GLARGINE 45 UNITS: 100 INJECTION, SOLUTION SUBCUTANEOUS at 21:31

## 2017-07-12 RX ADMIN — SODIUM CHLORIDE: 9 INJECTION, SOLUTION INTRAVENOUS at 19:11

## 2017-07-12 RX ADMIN — MOMETASONE FUROATE AND FORMOTEROL FUMARATE DIHYDRATE 2 PUFF: 200; 5 AEROSOL RESPIRATORY (INHALATION) at 20:41

## 2017-07-12 RX ADMIN — Medication 10 MG: at 21:31

## 2017-07-12 RX ADMIN — TOPIRAMATE 100 MG: 100 TABLET, FILM COATED ORAL at 20:02

## 2017-07-12 RX ADMIN — AMLODIPINE BESYLATE 5 MG: 5 TABLET ORAL at 19:06

## 2017-07-12 RX ADMIN — PREDNISONE 40 MG: 20 TABLET ORAL at 19:06

## 2017-07-12 RX ADMIN — CITALOPRAM 20 MG: 20 TABLET, FILM COATED ORAL at 19:06

## 2017-07-12 RX ADMIN — INSULIN LISPRO 2 UNITS: 100 INJECTION, SOLUTION INTRAVENOUS; SUBCUTANEOUS at 20:07

## 2017-07-12 RX ADMIN — ISOSORBIDE MONONITRATE 30 MG: 30 TABLET ORAL at 19:06

## 2017-07-12 RX ADMIN — METFORMIN HYDROCHLORIDE 1000 MG: 500 TABLET ORAL at 19:06

## 2017-07-12 RX ADMIN — MAGNESIUM GLUCONATE 500 MG ORAL TABLET 400 MG: 500 TABLET ORAL at 20:02

## 2017-07-12 RX ADMIN — PANTOPRAZOLE SODIUM 40 MG: 40 TABLET, DELAYED RELEASE ORAL at 19:06

## 2017-07-12 RX ADMIN — ATORVASTATIN CALCIUM 80 MG: 80 TABLET, FILM COATED ORAL at 20:02

## 2017-07-12 RX ADMIN — ENOXAPARIN SODIUM 40 MG: 40 INJECTION SUBCUTANEOUS at 19:06

## 2017-07-12 RX ADMIN — ASPIRIN 81 MG 324 MG: 81 TABLET ORAL at 12:03

## 2017-07-12 ASSESSMENT — ENCOUNTER SYMPTOMS
EYES NEGATIVE: 1
RHINORRHEA: 0
SORE THROAT: 0
DIARRHEA: 0
ABDOMINAL DISTENTION: 0
ALLERGIC/IMMUNOLOGIC NEGATIVE: 1
SHORTNESS OF BREATH: 1
NAUSEA: 0
RECTAL PAIN: 0

## 2017-07-13 VITALS
OXYGEN SATURATION: 96 % | DIASTOLIC BLOOD PRESSURE: 52 MMHG | RESPIRATION RATE: 16 BRPM | TEMPERATURE: 98.1 F | SYSTOLIC BLOOD PRESSURE: 111 MMHG | BODY MASS INDEX: 43.32 KG/M2 | WEIGHT: 235.44 LBS | HEART RATE: 62 BPM | HEIGHT: 62 IN

## 2017-07-13 PROBLEM — F41.0 PANIC ATTACK: Status: ACTIVE | Noted: 2017-07-13

## 2017-07-13 PROBLEM — R20.0 LEFT SIDED NUMBNESS: Status: ACTIVE | Noted: 2017-07-13

## 2017-07-13 LAB
ANION GAP SERPL CALCULATED.3IONS-SCNC: 12 MMOL/L (ref 9–17)
BUN BLDV-MCNC: 24 MG/DL (ref 8–23)
BUN/CREAT BLD: ABNORMAL (ref 9–20)
CALCIUM SERPL-MCNC: 8.4 MG/DL (ref 8.6–10.4)
CHLORIDE BLD-SCNC: 100 MMOL/L (ref 98–107)
CO2: 23 MMOL/L (ref 20–31)
CREAT SERPL-MCNC: 0.82 MG/DL (ref 0.5–0.9)
GFR AFRICAN AMERICAN: >60 ML/MIN
GFR NON-AFRICAN AMERICAN: >60 ML/MIN
GFR SERPL CREATININE-BSD FRML MDRD: ABNORMAL ML/MIN/{1.73_M2}
GFR SERPL CREATININE-BSD FRML MDRD: ABNORMAL ML/MIN/{1.73_M2}
GLUCOSE BLD-MCNC: 171 MG/DL (ref 65–105)
GLUCOSE BLD-MCNC: 183 MG/DL (ref 65–105)
GLUCOSE BLD-MCNC: 196 MG/DL (ref 65–105)
GLUCOSE BLD-MCNC: 230 MG/DL (ref 65–105)
GLUCOSE BLD-MCNC: 301 MG/DL (ref 65–105)
GLUCOSE BLD-MCNC: 302 MG/DL (ref 65–105)
GLUCOSE BLD-MCNC: 56 MG/DL (ref 65–105)
GLUCOSE BLD-MCNC: 71 MG/DL (ref 70–99)
GLUCOSE BLD-MCNC: 83 MG/DL (ref 65–105)
HCT VFR BLD CALC: 35.1 % (ref 36–46)
HEMOGLOBIN: 11.8 G/DL (ref 12–16)
MCH RBC QN AUTO: 30.4 PG (ref 26–34)
MCHC RBC AUTO-ENTMCNC: 33.7 G/DL (ref 31–37)
MCV RBC AUTO: 90 FL (ref 80–100)
PDW BLD-RTO: 14.5 % (ref 12.5–15.4)
PLATELET # BLD: ABNORMAL K/UL (ref 140–450)
PMV BLD AUTO: ABNORMAL FL (ref 6–12)
POTASSIUM SERPL-SCNC: 4.9 MMOL/L (ref 3.7–5.3)
RBC # BLD: 3.9 M/UL (ref 4–5.2)
SODIUM BLD-SCNC: 135 MMOL/L (ref 135–144)
WBC # BLD: 9.7 K/UL (ref 3.5–11)

## 2017-07-13 PROCEDURE — 99223 1ST HOSP IP/OBS HIGH 75: CPT | Performed by: FAMILY MEDICINE

## 2017-07-13 PROCEDURE — G8978 MOBILITY CURRENT STATUS: HCPCS

## 2017-07-13 PROCEDURE — 2580000003 HC RX 258: Performed by: FAMILY MEDICINE

## 2017-07-13 PROCEDURE — 80048 BASIC METABOLIC PNL TOTAL CA: CPT

## 2017-07-13 PROCEDURE — 97530 THERAPEUTIC ACTIVITIES: CPT

## 2017-07-13 PROCEDURE — 36415 COLL VENOUS BLD VENIPUNCTURE: CPT

## 2017-07-13 PROCEDURE — G8979 MOBILITY GOAL STATUS: HCPCS

## 2017-07-13 PROCEDURE — 85027 COMPLETE CBC AUTOMATED: CPT

## 2017-07-13 PROCEDURE — 97162 PT EVAL MOD COMPLEX 30 MIN: CPT

## 2017-07-13 PROCEDURE — 6360000002 HC RX W HCPCS: Performed by: FAMILY MEDICINE

## 2017-07-13 PROCEDURE — 82947 ASSAY GLUCOSE BLOOD QUANT: CPT

## 2017-07-13 PROCEDURE — 94640 AIRWAY INHALATION TREATMENT: CPT

## 2017-07-13 PROCEDURE — 6370000000 HC RX 637 (ALT 250 FOR IP): Performed by: FAMILY MEDICINE

## 2017-07-13 PROCEDURE — 95819 EEG AWAKE AND ASLEEP: CPT

## 2017-07-13 RX ORDER — FUROSEMIDE 20 MG/1
20 TABLET ORAL DAILY
Status: DISCONTINUED | OUTPATIENT
Start: 2017-07-14 | End: 2017-07-13 | Stop reason: HOSPADM

## 2017-07-13 RX ORDER — INSULIN GLARGINE 100 [IU]/ML
35 INJECTION, SOLUTION SUBCUTANEOUS NIGHTLY
Status: DISCONTINUED | OUTPATIENT
Start: 2017-07-13 | End: 2017-07-13 | Stop reason: HOSPADM

## 2017-07-13 RX ORDER — INSULIN GLARGINE 100 [IU]/ML
40 INJECTION, SOLUTION SUBCUTANEOUS EVERY MORNING
Status: DISCONTINUED | OUTPATIENT
Start: 2017-07-14 | End: 2017-07-13 | Stop reason: HOSPADM

## 2017-07-13 RX ADMIN — CITALOPRAM 20 MG: 20 TABLET, FILM COATED ORAL at 08:59

## 2017-07-13 RX ADMIN — LOSARTAN POTASSIUM 25 MG: 25 TABLET, FILM COATED ORAL at 08:59

## 2017-07-13 RX ADMIN — SODIUM CHLORIDE: 9 INJECTION, SOLUTION INTRAVENOUS at 05:48

## 2017-07-13 RX ADMIN — FUROSEMIDE 20 MG: 20 TABLET ORAL at 09:00

## 2017-07-13 RX ADMIN — METFORMIN HYDROCHLORIDE 1000 MG: 500 TABLET ORAL at 16:38

## 2017-07-13 RX ADMIN — MOMETASONE FUROATE AND FORMOTEROL FUMARATE DIHYDRATE 2 PUFF: 200; 5 AEROSOL RESPIRATORY (INHALATION) at 08:43

## 2017-07-13 RX ADMIN — MAGNESIUM GLUCONATE 500 MG ORAL TABLET 400 MG: 500 TABLET ORAL at 08:59

## 2017-07-13 RX ADMIN — CARVEDILOL 3.12 MG: 3.12 TABLET, FILM COATED ORAL at 16:38

## 2017-07-13 RX ADMIN — ISOSORBIDE MONONITRATE 30 MG: 30 TABLET ORAL at 08:59

## 2017-07-13 RX ADMIN — AMOXICILLIN AND CLAVULANATE POTASSIUM 1 TABLET: 875; 125 TABLET, FILM COATED ORAL at 08:59

## 2017-07-13 RX ADMIN — AMLODIPINE BESYLATE 5 MG: 5 TABLET ORAL at 08:59

## 2017-07-13 RX ADMIN — FERROUS SULFATE TAB EC 325 MG (65 MG FE EQUIVALENT) 325 MG: 325 (65 FE) TABLET DELAYED RESPONSE at 09:00

## 2017-07-13 RX ADMIN — CLOPIDOGREL 75 MG: 75 TABLET, FILM COATED ORAL at 08:59

## 2017-07-13 RX ADMIN — CARVEDILOL 3.12 MG: 3.12 TABLET, FILM COATED ORAL at 08:59

## 2017-07-13 RX ADMIN — INSULIN LISPRO 12 UNITS: 100 INJECTION, SOLUTION INTRAVENOUS; SUBCUTANEOUS at 11:06

## 2017-07-13 RX ADMIN — PANTOPRAZOLE SODIUM 40 MG: 40 TABLET, DELAYED RELEASE ORAL at 08:59

## 2017-07-13 RX ADMIN — INSULIN LISPRO 3 UNITS: 100 INJECTION, SOLUTION INTRAVENOUS; SUBCUTANEOUS at 16:38

## 2017-07-13 RX ADMIN — DOCUSATE SODIUM 100 MG: 100 CAPSULE ORAL at 08:59

## 2017-07-13 RX ADMIN — PANTOPRAZOLE SODIUM 40 MG: 40 TABLET, DELAYED RELEASE ORAL at 16:38

## 2017-07-13 RX ADMIN — METFORMIN HYDROCHLORIDE 1000 MG: 500 TABLET ORAL at 08:59

## 2017-07-13 RX ADMIN — ENOXAPARIN SODIUM 40 MG: 40 INJECTION SUBCUTANEOUS at 09:00

## 2017-07-13 RX ADMIN — INSULIN GLARGINE 45 UNITS: 100 INJECTION, SOLUTION SUBCUTANEOUS at 09:44

## 2017-07-13 RX ADMIN — PREDNISONE 40 MG: 20 TABLET ORAL at 08:59

## 2017-07-13 ASSESSMENT — ENCOUNTER SYMPTOMS
ABDOMINAL PAIN: 0
NAUSEA: 0
DIARRHEA: 0
WHEEZING: 0
VOICE CHANGE: 0
CONSTIPATION: 0
VOMITING: 0
SHORTNESS OF BREATH: 0
BLOOD IN STOOL: 0
COUGH: 0
SORE THROAT: 0
SINUS PRESSURE: 0

## 2017-07-13 ASSESSMENT — PAIN DESCRIPTION - PAIN TYPE
TYPE: ACUTE PAIN

## 2017-07-13 ASSESSMENT — PAIN DESCRIPTION - DESCRIPTORS: DESCRIPTORS: ACHING

## 2017-07-13 ASSESSMENT — PAIN DESCRIPTION - LOCATION
LOCATION: CHEST
LOCATION: ARM
LOCATION: LEG

## 2017-07-13 ASSESSMENT — PAIN SCALES - GENERAL
PAINLEVEL_OUTOF10: 3
PAINLEVEL_OUTOF10: 6
PAINLEVEL_OUTOF10: 4

## 2017-07-13 ASSESSMENT — PAIN DESCRIPTION - ORIENTATION: ORIENTATION: LEFT;UPPER

## 2017-07-14 LAB
EKG ATRIAL RATE: 69 BPM
EKG P AXIS: 18 DEGREES
EKG P-R INTERVAL: 130 MS
EKG Q-T INTERVAL: 376 MS
EKG QRS DURATION: 68 MS
EKG QTC CALCULATION (BAZETT): 402 MS
EKG R AXIS: -16 DEGREES
EKG T AXIS: 64 DEGREES
EKG VENTRICULAR RATE: 69 BPM

## 2017-07-19 ENCOUNTER — HOSPITAL ENCOUNTER (OUTPATIENT)
Dept: PAIN MANAGEMENT | Age: 67
Discharge: HOME | End: 2017-07-19
Payer: MEDICARE

## 2017-07-26 ENCOUNTER — HOSPITAL ENCOUNTER (OUTPATIENT)
Dept: PAIN MANAGEMENT | Age: 67
Discharge: HOME | End: 2017-07-26
Payer: MEDICARE

## 2017-07-26 VITALS
BODY MASS INDEX: 45.64 KG/M2 | TEMPERATURE: 97.6 F | RESPIRATION RATE: 20 BRPM | SYSTOLIC BLOOD PRESSURE: 107 MMHG | OXYGEN SATURATION: 97 % | WEIGHT: 248 LBS | HEIGHT: 62 IN | HEART RATE: 60 BPM | DIASTOLIC BLOOD PRESSURE: 51 MMHG

## 2017-07-26 DIAGNOSIS — M54.16 LUMBAR RADICULOPATHY, CHRONIC: ICD-10-CM

## 2017-07-26 DIAGNOSIS — M54.81 BILATERAL OCCIPITAL NEURALGIA: ICD-10-CM

## 2017-07-26 DIAGNOSIS — F32.89 OTHER DEPRESSION: ICD-10-CM

## 2017-07-26 DIAGNOSIS — G89.29 CHRONIC PAIN OF LEFT KNEE: ICD-10-CM

## 2017-07-26 DIAGNOSIS — G43.009 NONINTRACTABLE MIGRAINE, UNSPECIFIED MIGRAINE TYPE: ICD-10-CM

## 2017-07-26 DIAGNOSIS — M25.562 CHRONIC PAIN OF LEFT KNEE: ICD-10-CM

## 2017-07-26 DIAGNOSIS — M47.892 OTHER OSTEOARTHRITIS OF SPINE, CERVICAL REGION: ICD-10-CM

## 2017-07-26 DIAGNOSIS — G89.29 ENCOUNTER FOR CHRONIC PAIN MANAGEMENT: Primary | ICD-10-CM

## 2017-07-26 DIAGNOSIS — M50.30 DEGENERATIVE CERVICAL DISC: ICD-10-CM

## 2017-07-26 DIAGNOSIS — Z51.81 ENCOUNTER FOR MEDICATION MONITORING: ICD-10-CM

## 2017-07-26 DIAGNOSIS — M51.37 DEGENERATION OF LUMBAR OR LUMBOSACRAL INTERVERTEBRAL DISC: ICD-10-CM

## 2017-07-26 DIAGNOSIS — G43.709 CHRONIC MIGRAINE WITHOUT AURA WITHOUT STATUS MIGRAINOSUS, NOT INTRACTABLE: ICD-10-CM

## 2017-07-26 DIAGNOSIS — G43.119 INTRACTABLE MIGRAINE WITH AURA WITHOUT STATUS MIGRAINOSUS: ICD-10-CM

## 2017-07-26 DIAGNOSIS — M47.817 LUMBOSACRAL SPONDYLOSIS WITHOUT MYELOPATHY: ICD-10-CM

## 2017-07-26 PROCEDURE — 99213 OFFICE O/P EST LOW 20 MIN: CPT | Performed by: NURSE PRACTITIONER

## 2017-07-26 PROCEDURE — 99213 OFFICE O/P EST LOW 20 MIN: CPT

## 2017-07-26 RX ORDER — OXYCODONE HYDROCHLORIDE AND ACETAMINOPHEN 5; 325 MG/1; MG/1
1 TABLET ORAL EVERY 6 HOURS PRN
Qty: 100 TABLET | Refills: 0 | Status: SHIPPED | OUTPATIENT
Start: 2017-07-26 | End: 2017-08-24 | Stop reason: SDUPTHER

## 2017-07-26 ASSESSMENT — ENCOUNTER SYMPTOMS
BACK PAIN: 1
SHORTNESS OF BREATH: 1
NAUSEA: 1

## 2017-08-24 ENCOUNTER — HOSPITAL ENCOUNTER (OUTPATIENT)
Dept: PAIN MANAGEMENT | Age: 67
Discharge: HOME | End: 2017-08-24
Payer: MEDICARE

## 2017-08-24 VITALS
DIASTOLIC BLOOD PRESSURE: 59 MMHG | BODY MASS INDEX: 45.64 KG/M2 | SYSTOLIC BLOOD PRESSURE: 138 MMHG | HEIGHT: 62 IN | OXYGEN SATURATION: 96 % | RESPIRATION RATE: 20 BRPM | TEMPERATURE: 97.8 F | HEART RATE: 65 BPM | WEIGHT: 248 LBS

## 2017-08-24 DIAGNOSIS — J44.9 CHRONIC OBSTRUCTIVE PULMONARY DISEASE, UNSPECIFIED COPD TYPE (HCC): ICD-10-CM

## 2017-08-24 DIAGNOSIS — F11.90 CHRONIC, CONTINUOUS USE OF OPIOIDS: ICD-10-CM

## 2017-08-24 DIAGNOSIS — G43.009 NONINTRACTABLE MIGRAINE, UNSPECIFIED MIGRAINE TYPE: ICD-10-CM

## 2017-08-24 DIAGNOSIS — G89.29 ENCOUNTER FOR CHRONIC PAIN MANAGEMENT: Primary | ICD-10-CM

## 2017-08-24 DIAGNOSIS — M47.817 LUMBOSACRAL SPONDYLOSIS WITHOUT MYELOPATHY: ICD-10-CM

## 2017-08-24 DIAGNOSIS — G89.29 CHRONIC PAIN OF LEFT KNEE: ICD-10-CM

## 2017-08-24 DIAGNOSIS — E66.01 OBESITY, MORBID, BMI 40.0-49.9 (HCC): ICD-10-CM

## 2017-08-24 DIAGNOSIS — G43.119 INTRACTABLE MIGRAINE WITH AURA WITHOUT STATUS MIGRAINOSUS: ICD-10-CM

## 2017-08-24 DIAGNOSIS — M25.562 CHRONIC PAIN OF LEFT KNEE: ICD-10-CM

## 2017-08-24 DIAGNOSIS — M51.37 DEGENERATION OF LUMBAR OR LUMBOSACRAL INTERVERTEBRAL DISC: ICD-10-CM

## 2017-08-24 DIAGNOSIS — Z51.81 ENCOUNTER FOR MEDICATION MONITORING: ICD-10-CM

## 2017-08-24 DIAGNOSIS — G43.709 CHRONIC MIGRAINE WITHOUT AURA WITHOUT STATUS MIGRAINOSUS, NOT INTRACTABLE: ICD-10-CM

## 2017-08-24 DIAGNOSIS — M50.30 DEGENERATIVE CERVICAL DISC: ICD-10-CM

## 2017-08-24 DIAGNOSIS — M54.81 BILATERAL OCCIPITAL NEURALGIA: ICD-10-CM

## 2017-08-24 DIAGNOSIS — M54.16 LUMBAR RADICULOPATHY, CHRONIC: ICD-10-CM

## 2017-08-24 PROCEDURE — 99213 OFFICE O/P EST LOW 20 MIN: CPT

## 2017-08-24 PROCEDURE — 99213 OFFICE O/P EST LOW 20 MIN: CPT | Performed by: NURSE PRACTITIONER

## 2017-08-24 RX ORDER — OXYCODONE HYDROCHLORIDE AND ACETAMINOPHEN 5; 325 MG/1; MG/1
1 TABLET ORAL EVERY 6 HOURS PRN
Qty: 100 TABLET | Refills: 0 | Status: SHIPPED | OUTPATIENT
Start: 2017-08-25 | End: 2017-09-26 | Stop reason: SDUPTHER

## 2017-08-24 RX ORDER — RANITIDINE 150 MG/1
TABLET ORAL
Status: ON HOLD | COMMUNITY
Start: 2017-07-19 | End: 2018-02-06 | Stop reason: HOSPADM

## 2017-08-24 ASSESSMENT — ENCOUNTER SYMPTOMS
GASTROINTESTINAL NEGATIVE: 1
BACK PAIN: 1

## 2017-08-28 RX ORDER — LOSARTAN POTASSIUM 25 MG/1
TABLET ORAL
Qty: 90 TABLET | Refills: 0 | Status: SHIPPED | OUTPATIENT
Start: 2017-08-28 | End: 2017-12-01 | Stop reason: SDUPTHER

## 2017-09-05 ENCOUNTER — TELEPHONE (OUTPATIENT)
Dept: FAMILY MEDICINE CLINIC | Age: 67
End: 2017-09-05

## 2017-09-11 ENCOUNTER — OFFICE VISIT (OUTPATIENT)
Dept: FAMILY MEDICINE CLINIC | Age: 67
End: 2017-09-11
Payer: MEDICARE

## 2017-09-11 VITALS
BODY MASS INDEX: 45.8 KG/M2 | WEIGHT: 242.6 LBS | OXYGEN SATURATION: 93 % | HEART RATE: 61 BPM | SYSTOLIC BLOOD PRESSURE: 120 MMHG | HEIGHT: 61 IN | RESPIRATION RATE: 20 BRPM | DIASTOLIC BLOOD PRESSURE: 76 MMHG | TEMPERATURE: 97.5 F

## 2017-09-11 DIAGNOSIS — Z79.4 TYPE 2 DIABETES MELLITUS WITH DIABETIC POLYNEUROPATHY, WITH LONG-TERM CURRENT USE OF INSULIN (HCC): Chronic | ICD-10-CM

## 2017-09-11 DIAGNOSIS — M54.2 NECK PAIN: ICD-10-CM

## 2017-09-11 DIAGNOSIS — E11.42 TYPE 2 DIABETES MELLITUS WITH DIABETIC POLYNEUROPATHY, WITH LONG-TERM CURRENT USE OF INSULIN (HCC): Chronic | ICD-10-CM

## 2017-09-11 DIAGNOSIS — J44.9 CHRONIC OBSTRUCTIVE PULMONARY DISEASE, UNSPECIFIED COPD TYPE (HCC): ICD-10-CM

## 2017-09-11 DIAGNOSIS — G62.9 NEUROPATHY: Primary | ICD-10-CM

## 2017-09-11 DIAGNOSIS — Z23 ENCOUNTER FOR IMMUNIZATION: ICD-10-CM

## 2017-09-11 PROCEDURE — 99214 OFFICE O/P EST MOD 30 MIN: CPT | Performed by: FAMILY MEDICINE

## 2017-09-11 PROCEDURE — G0009 ADMIN PNEUMOCOCCAL VACCINE: HCPCS | Performed by: FAMILY MEDICINE

## 2017-09-11 PROCEDURE — 90732 PPSV23 VACC 2 YRS+ SUBQ/IM: CPT | Performed by: FAMILY MEDICINE

## 2017-09-11 RX ORDER — GABAPENTIN 100 MG/1
100 CAPSULE ORAL 3 TIMES DAILY
Qty: 90 CAPSULE | Refills: 3 | Status: SHIPPED | OUTPATIENT
Start: 2017-09-11 | End: 2017-10-19 | Stop reason: SINTOL

## 2017-09-26 ENCOUNTER — HOSPITAL ENCOUNTER (OUTPATIENT)
Dept: PAIN MANAGEMENT | Age: 67
Discharge: HOME | End: 2017-09-26
Payer: MEDICARE

## 2017-09-26 VITALS
OXYGEN SATURATION: 94 % | TEMPERATURE: 97.4 F | HEIGHT: 61 IN | DIASTOLIC BLOOD PRESSURE: 43 MMHG | BODY MASS INDEX: 45.69 KG/M2 | WEIGHT: 242 LBS | RESPIRATION RATE: 20 BRPM | HEART RATE: 58 BPM | SYSTOLIC BLOOD PRESSURE: 98 MMHG

## 2017-09-26 DIAGNOSIS — M47.817 LUMBOSACRAL SPONDYLOSIS WITHOUT MYELOPATHY: ICD-10-CM

## 2017-09-26 DIAGNOSIS — G43.009 NONINTRACTABLE MIGRAINE, UNSPECIFIED MIGRAINE TYPE: ICD-10-CM

## 2017-09-26 DIAGNOSIS — G89.29 CHRONIC PAIN OF LEFT KNEE: ICD-10-CM

## 2017-09-26 DIAGNOSIS — M47.812 OSTEOARTHRITIS OF CERVICAL SPINE, UNSPECIFIED SPINAL OSTEOARTHRITIS COMPLICATION STATUS: ICD-10-CM

## 2017-09-26 DIAGNOSIS — M51.37 DEGENERATION OF LUMBAR OR LUMBOSACRAL INTERVERTEBRAL DISC: ICD-10-CM

## 2017-09-26 DIAGNOSIS — M25.562 CHRONIC PAIN OF LEFT KNEE: ICD-10-CM

## 2017-09-26 DIAGNOSIS — M50.30 DEGENERATIVE CERVICAL DISC: ICD-10-CM

## 2017-09-26 DIAGNOSIS — E66.01 OBESITY, MORBID, BMI 40.0-49.9 (HCC): ICD-10-CM

## 2017-09-26 DIAGNOSIS — G43.709 CHRONIC MIGRAINE WITHOUT AURA WITHOUT STATUS MIGRAINOSUS, NOT INTRACTABLE: ICD-10-CM

## 2017-09-26 DIAGNOSIS — G89.29 ENCOUNTER FOR CHRONIC PAIN MANAGEMENT: Primary | ICD-10-CM

## 2017-09-26 DIAGNOSIS — M54.16 LUMBAR RADICULOPATHY, CHRONIC: ICD-10-CM

## 2017-09-26 DIAGNOSIS — Z51.81 ENCOUNTER FOR MEDICATION MONITORING: ICD-10-CM

## 2017-09-26 PROCEDURE — 99213 OFFICE O/P EST LOW 20 MIN: CPT

## 2017-09-26 PROCEDURE — 99213 OFFICE O/P EST LOW 20 MIN: CPT | Performed by: NURSE PRACTITIONER

## 2017-09-26 PROCEDURE — 80307 DRUG TEST PRSMV CHEM ANLYZR: CPT

## 2017-09-26 RX ORDER — OXYCODONE HYDROCHLORIDE AND ACETAMINOPHEN 5; 325 MG/1; MG/1
1 TABLET ORAL EVERY 6 HOURS PRN
Qty: 100 TABLET | Refills: 0 | Status: SHIPPED | OUTPATIENT
Start: 2017-09-26 | End: 2017-10-17 | Stop reason: SDUPTHER

## 2017-09-26 ASSESSMENT — ENCOUNTER SYMPTOMS
BACK PAIN: 1
SHORTNESS OF BREATH: 1
GASTROINTESTINAL NEGATIVE: 1
EYES NEGATIVE: 1

## 2017-10-01 LAB
6-ACETYLMORPHINE, UR: NOT DETECTED
7-AMINOCLONAZEPAM, URINE: NOT DETECTED
ALPHA-OH-ALPRAZ, URINE: NOT DETECTED
ALPRAZOLAM, URINE: NOT DETECTED
AMPHETAMINES, URINE: NOT DETECTED
BARBITURATES, URINE: NOT DETECTED
BENZOYLECGONINE, UR: NOT DETECTED
BUPRENORPHINE URINE: NOT DETECTED
CARISOPRODOL, UR: NOT DETECTED
CLONAZEPAM, URINE: NOT DETECTED
CODEINE, URINE: NOT DETECTED
CREATININE URINE: 257.8 MG/DL (ref 20–400)
DIAZEPAM, URINE: NOT DETECTED
DRUGS EXPECTED, UR: NORMAL
EER HI RES INTERP UR: NORMAL
ETHYL GLUCURONIDE UR: NOT DETECTED
FENTANYL URINE: NOT DETECTED
HYDROCODONE, URINE: NOT DETECTED
HYDROMORPHONE, URINE: NOT DETECTED
LORAZEPAM, URINE: NOT DETECTED
MARIJUANA METAB, UR: NOT DETECTED
MDA, UR: NOT DETECTED
MDEA, EVE, UR: NOT DETECTED
MDMA URINE: NOT DETECTED
MEPERIDINE METAB, UR: NOT DETECTED
METHADONE, URINE: NOT DETECTED
METHAMPHETAMINE, URINE: NOT DETECTED
METHYLPHENIDATE: NOT DETECTED
MIDAZOLAM, URINE: NOT DETECTED
MORPHINE URINE: NOT DETECTED
NORBUPRENORPHINE, URINE: NOT DETECTED
NORDIAZEPAM, URINE: NOT DETECTED
NORFENTANYL, URINE: NOT DETECTED
NORHYDROCODONE, URINE: NOT DETECTED
NOROXYCODONE, URINE: PRESENT
NOROXYMORPHONE, URINE: NOT DETECTED
OXAZEPAM, URINE: NOT DETECTED
OXYCODONE URINE: PRESENT
OXYMORPHONE, URINE: NOT DETECTED
PAIN MANAGEMENT DRUG PANEL INTERP, URINE: NORMAL
PAIN MGT DRUG PANEL, HI RES, UR: NORMAL
PCP,URINE: NOT DETECTED
PHENTERMINE, UR: NOT DETECTED
PROPOXYPHENE, URINE: NOT DETECTED
TAPENTADOL, URINE: NOT DETECTED
TAPENTADOL-O-SULFATE, URINE: NOT DETECTED
TEMAZEPAM, URINE: NOT DETECTED
TRAMADOL, URINE: NOT DETECTED
ZOLPIDEM, URINE: NOT DETECTED

## 2017-10-13 ENCOUNTER — APPOINTMENT (OUTPATIENT)
Dept: GENERAL RADIOLOGY | Age: 67
End: 2017-10-13
Payer: MEDICARE

## 2017-10-13 ENCOUNTER — APPOINTMENT (OUTPATIENT)
Dept: CT IMAGING | Age: 67
End: 2017-10-13
Payer: MEDICARE

## 2017-10-13 ENCOUNTER — HOSPITAL ENCOUNTER (OUTPATIENT)
Age: 67
Setting detail: OBSERVATION
Discharge: HOME | End: 2017-10-14
Attending: EMERGENCY MEDICINE | Admitting: EMERGENCY MEDICINE
Payer: MEDICARE

## 2017-10-13 DIAGNOSIS — R07.9 CHEST PAIN, UNSPECIFIED TYPE: Primary | ICD-10-CM

## 2017-10-13 LAB
ABSOLUTE EOS #: 0.2 K/UL (ref 0–0.4)
ABSOLUTE LYMPH #: 2.3 K/UL (ref 1–4.8)
ABSOLUTE MONO #: 0.7 K/UL (ref 0.1–1.2)
ANION GAP SERPL CALCULATED.3IONS-SCNC: 14 MMOL/L (ref 9–17)
BASOPHILS # BLD: 1 %
BASOPHILS ABSOLUTE: 0 K/UL (ref 0–0.2)
BNP INTERPRETATION: NORMAL
BUN BLDV-MCNC: 10 MG/DL (ref 8–23)
BUN/CREAT BLD: ABNORMAL (ref 9–20)
CALCIUM SERPL-MCNC: 8.7 MG/DL (ref 8.6–10.4)
CHLORIDE BLD-SCNC: 99 MMOL/L (ref 98–107)
CO2: 24 MMOL/L (ref 20–31)
CREAT SERPL-MCNC: 0.87 MG/DL (ref 0.5–0.9)
DIFFERENTIAL TYPE: NORMAL
EOSINOPHILS RELATIVE PERCENT: 2 %
GFR AFRICAN AMERICAN: >60 ML/MIN
GFR NON-AFRICAN AMERICAN: >60 ML/MIN
GFR SERPL CREATININE-BSD FRML MDRD: ABNORMAL ML/MIN/{1.73_M2}
GFR SERPL CREATININE-BSD FRML MDRD: ABNORMAL ML/MIN/{1.73_M2}
GLUCOSE BLD-MCNC: 133 MG/DL (ref 70–99)
GLUCOSE BLD-MCNC: 90 MG/DL (ref 65–105)
HCT VFR BLD CALC: 39 % (ref 36–46)
HEMOGLOBIN: 12.6 G/DL (ref 12–16)
LYMPHOCYTES # BLD: 27 %
MCH RBC QN AUTO: 29.7 PG (ref 26–34)
MCHC RBC AUTO-ENTMCNC: 32.3 G/DL (ref 31–37)
MCV RBC AUTO: 91.8 FL (ref 80–100)
MONOCYTES # BLD: 8 %
PDW BLD-RTO: 14.3 % (ref 12.5–15.4)
PLATELET # BLD: 204 K/UL (ref 140–450)
PLATELET ESTIMATE: NORMAL
PMV BLD AUTO: 10.3 FL (ref 6–12)
POC TROPONIN I: 0 NG/ML (ref 0–0.1)
POC TROPONIN I: 0 NG/ML (ref 0–0.1)
POC TROPONIN INTERP: NORMAL
POC TROPONIN INTERP: NORMAL
POTASSIUM SERPL-SCNC: 4.4 MMOL/L (ref 3.7–5.3)
PRO-BNP: 236 PG/ML
RBC # BLD: 4.25 M/UL (ref 4–5.2)
RBC # BLD: NORMAL 10*6/UL
SEG NEUTROPHILS: 62 %
SEGMENTED NEUTROPHILS ABSOLUTE COUNT: 5.4 K/UL (ref 1.8–7.7)
SODIUM BLD-SCNC: 137 MMOL/L (ref 135–144)
TSH SERPL DL<=0.05 MIU/L-ACNC: 3.27 MIU/L (ref 0.3–5)
WBC # BLD: 8.7 K/UL (ref 3.5–11)
WBC # BLD: NORMAL 10*3/UL

## 2017-10-13 PROCEDURE — 6370000000 HC RX 637 (ALT 250 FOR IP): Performed by: EMERGENCY MEDICINE

## 2017-10-13 PROCEDURE — 70450 CT HEAD/BRAIN W/O DYE: CPT

## 2017-10-13 PROCEDURE — 2580000003 HC RX 258: Performed by: EMERGENCY MEDICINE

## 2017-10-13 PROCEDURE — G0378 HOSPITAL OBSERVATION PER HR: HCPCS

## 2017-10-13 PROCEDURE — 96375 TX/PRO/DX INJ NEW DRUG ADDON: CPT

## 2017-10-13 PROCEDURE — 85025 COMPLETE CBC W/AUTO DIFF WBC: CPT

## 2017-10-13 PROCEDURE — 6360000002 HC RX W HCPCS: Performed by: EMERGENCY MEDICINE

## 2017-10-13 PROCEDURE — 96374 THER/PROPH/DIAG INJ IV PUSH: CPT

## 2017-10-13 PROCEDURE — 83880 ASSAY OF NATRIURETIC PEPTIDE: CPT

## 2017-10-13 PROCEDURE — 82947 ASSAY GLUCOSE BLOOD QUANT: CPT

## 2017-10-13 PROCEDURE — 84443 ASSAY THYROID STIM HORMONE: CPT

## 2017-10-13 PROCEDURE — 2500000003 HC RX 250 WO HCPCS: Performed by: EMERGENCY MEDICINE

## 2017-10-13 PROCEDURE — 93005 ELECTROCARDIOGRAM TRACING: CPT

## 2017-10-13 PROCEDURE — 80048 BASIC METABOLIC PNL TOTAL CA: CPT

## 2017-10-13 PROCEDURE — 84484 ASSAY OF TROPONIN QUANT: CPT

## 2017-10-13 PROCEDURE — 94664 DEMO&/EVAL PT USE INHALER: CPT

## 2017-10-13 PROCEDURE — 94640 AIRWAY INHALATION TREATMENT: CPT

## 2017-10-13 PROCEDURE — 71020 XR CHEST STANDARD TWO VW: CPT

## 2017-10-13 PROCEDURE — S0028 INJECTION, FAMOTIDINE, 20 MG: HCPCS | Performed by: EMERGENCY MEDICINE

## 2017-10-13 PROCEDURE — 99285 EMERGENCY DEPT VISIT HI MDM: CPT

## 2017-10-13 RX ORDER — PANTOPRAZOLE SODIUM 40 MG/1
40 TABLET, DELAYED RELEASE ORAL 2 TIMES DAILY
Status: DISCONTINUED | OUTPATIENT
Start: 2017-10-13 | End: 2017-10-14 | Stop reason: HOSPADM

## 2017-10-13 RX ORDER — DOCUSATE SODIUM 100 MG/1
100 CAPSULE, LIQUID FILLED ORAL 2 TIMES DAILY
Status: DISCONTINUED | OUTPATIENT
Start: 2017-10-13 | End: 2017-10-14 | Stop reason: HOSPADM

## 2017-10-13 RX ORDER — ALBUTEROL SULFATE 2.5 MG/3ML
2.5 SOLUTION RESPIRATORY (INHALATION) EVERY 6 HOURS PRN
Status: DISCONTINUED | OUTPATIENT
Start: 2017-10-13 | End: 2017-10-14 | Stop reason: HOSPADM

## 2017-10-13 RX ORDER — ATORVASTATIN CALCIUM 80 MG/1
80 TABLET, FILM COATED ORAL NIGHTLY
Status: DISCONTINUED | OUTPATIENT
Start: 2017-10-13 | End: 2017-10-14 | Stop reason: HOSPADM

## 2017-10-13 RX ORDER — FUROSEMIDE 20 MG/1
20 TABLET ORAL PRN
Status: DISCONTINUED | OUTPATIENT
Start: 2017-10-13 | End: 2017-10-14 | Stop reason: HOSPADM

## 2017-10-13 RX ORDER — SODIUM CHLORIDE 9 MG/ML
INJECTION, SOLUTION INTRAVENOUS CONTINUOUS
Status: DISCONTINUED | OUTPATIENT
Start: 2017-10-13 | End: 2017-10-13

## 2017-10-13 RX ORDER — FAMOTIDINE 20 MG/1
20 TABLET, FILM COATED ORAL DAILY
Status: DISCONTINUED | OUTPATIENT
Start: 2017-10-13 | End: 2017-10-13

## 2017-10-13 RX ORDER — DIPHENHYDRAMINE HYDROCHLORIDE 50 MG/ML
25 INJECTION INTRAMUSCULAR; INTRAVENOUS ONCE
Status: COMPLETED | OUTPATIENT
Start: 2017-10-13 | End: 2017-10-13

## 2017-10-13 RX ORDER — DIPHENHYDRAMINE HCL 25 MG
25 TABLET ORAL NIGHTLY PRN
Status: DISCONTINUED | OUTPATIENT
Start: 2017-10-13 | End: 2017-10-14 | Stop reason: HOSPADM

## 2017-10-13 RX ORDER — SODIUM CHLORIDE 0.9 % (FLUSH) 0.9 %
10 SYRINGE (ML) INJECTION EVERY 12 HOURS SCHEDULED
Status: DISCONTINUED | OUTPATIENT
Start: 2017-10-13 | End: 2017-10-14 | Stop reason: HOSPADM

## 2017-10-13 RX ORDER — LOSARTAN POTASSIUM 25 MG/1
25 TABLET ORAL DAILY
Status: DISCONTINUED | OUTPATIENT
Start: 2017-10-13 | End: 2017-10-14 | Stop reason: HOSPADM

## 2017-10-13 RX ORDER — CLOPIDOGREL BISULFATE 75 MG/1
75 TABLET ORAL ONCE
Status: DISCONTINUED | OUTPATIENT
Start: 2017-10-13 | End: 2017-10-14 | Stop reason: HOSPADM

## 2017-10-13 RX ORDER — ISOSORBIDE MONONITRATE 30 MG/1
30 TABLET, EXTENDED RELEASE ORAL DAILY
Status: DISCONTINUED | OUTPATIENT
Start: 2017-10-13 | End: 2017-10-14 | Stop reason: HOSPADM

## 2017-10-13 RX ORDER — ONDANSETRON 2 MG/ML
4 INJECTION INTRAMUSCULAR; INTRAVENOUS EVERY 8 HOURS PRN
Status: DISCONTINUED | OUTPATIENT
Start: 2017-10-13 | End: 2017-10-14 | Stop reason: HOSPADM

## 2017-10-13 RX ORDER — SODIUM CHLORIDE 0.9 % (FLUSH) 0.9 %
10 SYRINGE (ML) INJECTION PRN
Status: DISCONTINUED | OUTPATIENT
Start: 2017-10-13 | End: 2017-10-14 | Stop reason: HOSPADM

## 2017-10-13 RX ORDER — CARVEDILOL 3.12 MG/1
3.12 TABLET ORAL 2 TIMES DAILY
Status: DISCONTINUED | OUTPATIENT
Start: 2017-10-13 | End: 2017-10-14 | Stop reason: HOSPADM

## 2017-10-13 RX ORDER — GABAPENTIN 100 MG/1
100 CAPSULE ORAL 3 TIMES DAILY
Status: DISCONTINUED | OUTPATIENT
Start: 2017-10-13 | End: 2017-10-14 | Stop reason: HOSPADM

## 2017-10-13 RX ORDER — CITALOPRAM 20 MG/1
20 TABLET ORAL 2 TIMES DAILY
Status: DISCONTINUED | OUTPATIENT
Start: 2017-10-13 | End: 2017-10-14 | Stop reason: HOSPADM

## 2017-10-13 RX ORDER — TOPIRAMATE 100 MG/1
100 TABLET, FILM COATED ORAL NIGHTLY
Status: DISCONTINUED | OUTPATIENT
Start: 2017-10-13 | End: 2017-10-14 | Stop reason: HOSPADM

## 2017-10-13 RX ORDER — LANOLIN ALCOHOL/MO/W.PET/CERES
325 CREAM (GRAM) TOPICAL
Status: DISCONTINUED | OUTPATIENT
Start: 2017-10-14 | End: 2017-10-14 | Stop reason: HOSPADM

## 2017-10-13 RX ORDER — AMLODIPINE BESYLATE 5 MG/1
5 TABLET ORAL DAILY
Status: DISCONTINUED | OUTPATIENT
Start: 2017-10-13 | End: 2017-10-14 | Stop reason: HOSPADM

## 2017-10-13 RX ORDER — ONDANSETRON 2 MG/ML
4 INJECTION INTRAMUSCULAR; INTRAVENOUS ONCE
Status: COMPLETED | OUTPATIENT
Start: 2017-10-13 | End: 2017-10-13

## 2017-10-13 RX ORDER — FENTANYL CITRATE 50 UG/ML
25 INJECTION, SOLUTION INTRAMUSCULAR; INTRAVENOUS ONCE
Status: COMPLETED | OUTPATIENT
Start: 2017-10-13 | End: 2017-10-13

## 2017-10-13 RX ORDER — ACETAMINOPHEN 325 MG/1
650 TABLET ORAL EVERY 4 HOURS PRN
Status: DISCONTINUED | OUTPATIENT
Start: 2017-10-13 | End: 2017-10-14 | Stop reason: HOSPADM

## 2017-10-13 RX ORDER — UREA 10 %
10 LOTION (ML) TOPICAL NIGHTLY
Status: DISCONTINUED | OUTPATIENT
Start: 2017-10-13 | End: 2017-10-14 | Stop reason: HOSPADM

## 2017-10-13 RX ORDER — 0.9 % SODIUM CHLORIDE 0.9 %
500 INTRAVENOUS SOLUTION INTRAVENOUS ONCE
Status: COMPLETED | OUTPATIENT
Start: 2017-10-13 | End: 2017-10-13

## 2017-10-13 RX ADMIN — MOMETASONE FUROATE AND FORMOTEROL FUMARATE DIHYDRATE 2 PUFF: 100; 5 AEROSOL RESPIRATORY (INHALATION) at 20:23

## 2017-10-13 RX ADMIN — PROCHLORPERAZINE EDISYLATE 10 MG: 5 INJECTION INTRAMUSCULAR; INTRAVENOUS at 16:40

## 2017-10-13 RX ADMIN — Medication 10 MG: at 20:45

## 2017-10-13 RX ADMIN — ONDANSETRON 4 MG: 2 INJECTION, SOLUTION INTRAMUSCULAR; INTRAVENOUS at 15:51

## 2017-10-13 RX ADMIN — DIPHENHYDRAMINE HYDROCHLORIDE 25 MG: 50 INJECTION, SOLUTION INTRAMUSCULAR; INTRAVENOUS at 16:58

## 2017-10-13 RX ADMIN — DIPHENHYDRAMINE HYDROCHLORIDE 25 MG: 50 INJECTION, SOLUTION INTRAMUSCULAR; INTRAVENOUS at 16:40

## 2017-10-13 RX ADMIN — DOCUSATE SODIUM 100 MG: 100 CAPSULE, LIQUID FILLED ORAL at 20:44

## 2017-10-13 RX ADMIN — ATORVASTATIN CALCIUM 80 MG: 80 TABLET, FILM COATED ORAL at 20:44

## 2017-10-13 RX ADMIN — FAMOTIDINE 20 MG: 10 INJECTION, SOLUTION INTRAVENOUS at 16:57

## 2017-10-13 RX ADMIN — SODIUM CHLORIDE 500 ML: 9 INJECTION, SOLUTION INTRAVENOUS at 15:51

## 2017-10-13 RX ADMIN — FENTANYL CITRATE 25 MCG: 50 INJECTION, SOLUTION INTRAMUSCULAR; INTRAVENOUS at 15:51

## 2017-10-13 RX ADMIN — GABAPENTIN 100 MG: 100 CAPSULE ORAL at 20:44

## 2017-10-13 RX ADMIN — PANTOPRAZOLE SODIUM 40 MG: 40 TABLET, DELAYED RELEASE ORAL at 20:43

## 2017-10-13 RX ADMIN — SODIUM CHLORIDE, PRESERVATIVE FREE 10 ML: 5 INJECTION INTRAVENOUS at 20:47

## 2017-10-13 RX ADMIN — CITALOPRAM 20 MG: 20 TABLET, FILM COATED ORAL at 20:43

## 2017-10-13 RX ADMIN — CARVEDILOL 3.12 MG: 3.12 TABLET, FILM COATED ORAL at 20:44

## 2017-10-13 RX ADMIN — IPRATROPIUM BROMIDE 0.5 MG: 0.5 SOLUTION RESPIRATORY (INHALATION) at 20:22

## 2017-10-13 RX ADMIN — TOPIRAMATE 100 MG: 100 TABLET, FILM COATED ORAL at 20:43

## 2017-10-13 RX ADMIN — METFORMIN HYDROCHLORIDE 1000 MG: 500 TABLET ORAL at 20:43

## 2017-10-13 ASSESSMENT — PAIN DESCRIPTION - PAIN TYPE: TYPE: ACUTE PAIN

## 2017-10-13 ASSESSMENT — ENCOUNTER SYMPTOMS
TROUBLE SWALLOWING: 0
ABDOMINAL PAIN: 0
COUGH: 1
CHEST TIGHTNESS: 1
DIARRHEA: 0
CONSTIPATION: 0
SHORTNESS OF BREATH: 0
NAUSEA: 1
VOMITING: 0
BACK PAIN: 0

## 2017-10-13 ASSESSMENT — HEART SCORE
ECG: 0
ECG: 0

## 2017-10-13 ASSESSMENT — PAIN DESCRIPTION - LOCATION: LOCATION: CHEST

## 2017-10-13 ASSESSMENT — PAIN SCALES - GENERAL
PAINLEVEL_OUTOF10: 7
PAINLEVEL_OUTOF10: 8

## 2017-10-13 NOTE — ED NOTES
Bed: 09  Expected date:   Expected time:   Means of arrival:   Comments:  LS Via Tigre Weber, RN  10/13/17 [id number]

## 2017-10-13 NOTE — ED PROVIDER NOTES
[facility]     Emergency Department     Faculty Attestation    I performed a history and physical examination of the patient and discussed management with the resident. I have reviewed and agree with the residents findings including all diagnostic interpretations, and treatment plans as written. Any areas of disagreement are noted on the chart. I was personally present for the key portions of any procedures. I have documented in the chart those procedures where I was not present during the key portions. I have reviewed the emergency nurses triage note. I agree with the chief complaint, past medical history, past surgical history, allergies, medications, social and family history as documented unless otherwise noted below. Documentation of the HPI, Physical Exam and Medical Decision Making performed by bob is based on my personal performance of the HPI, PE and MDM. For Physician Assistant/ Nurse Practitioner cases/documentation I have personally evaluated this patient and have completed at least one if not all key elements of the E/M (history, physical exam, and MDM). Additional findings are as noted. Primary Care Physician: Isidra Dover MD    History: This is a 78 y.o. female who presents to the Emergency Department with complaint of Headache. The patient presents to emergency complaining of a headache as well as chest pain. Headache started earlier and is not the worst headache of her life. Patient has not taken any analgesics for her headaches. Patient also complains of chest pain was scribes is a dull pressure with some associated nausea but denies any vomiting. Patient has some shortness of breath and diaphoresis. Physical:   height is 5' 2\" (1.575 m) and weight is 248 lb (112.5 kg). Her oral temperature is 96.8 °F (36 °C). Her pulse is 70. Her respiration is 18 and oxygen saturation is 97%.   Lungs are clear auscultation bilaterally, heart

## 2017-10-13 NOTE — ED PROVIDER NOTES
Compazine [prochlorperazine maleate]; Iodides; Morphine; Reglan [metoclopramide]; Avelox [moxifloxacin hcl in nacl]; Cipro xr; and Sulfa antibiotics    Home Medications:  Prior to Admission medications    Medication Sig Start Date End Date Taking?  Authorizing Provider   oxyCODONE-acetaminophen (PERCOCET) 5-325 MG per tablet Take 1 tablet by mouth every 6 hours as needed for Pain (take one tablet three to four times a day as needed for lower back pain.) . 9/26/17   VERONIKA Yuen   gabapentin (NEURONTIN) 100 MG capsule Take 1 capsule by mouth 3 times daily 9/11/17   Tayla Lee MD   Compression Stockings MISC by Does not apply route Pressure between 20 - 25 . 9/11/17   Tayla Lee MD   losartan (COZAAR) 25 MG tablet TAKE 1 TAB BY MOUTH ONCE A DAY 8/28/17   Casey Townsend MD   ranitidine (ZANTAC) 150 MG tablet  7/19/17   Historical Provider, MD   insulin glargine (LANTUS SOLOSTAR) 100 UNIT/ML injection pen Inject 35 Units into the skin nightly 7/13/17   Alayna Reddy MD   insulin glargine (LANTUS SOLOSTAR) 100 UNIT/ML injection pen Inject 45 Units into the skin every morning 7/13/17   Alayna Reddy MD   amLODIPine (NORVASC) 5 MG tablet TAKE 1 TAB BY MOUTH ONCE A DAY 6/29/17   Casey Townsend MD   topiramate (TOPAMAX) 100 MG tablet Take 1 tablet by mouth nightly 6/21/17   Lloyd Rae MD   carvedilol (COREG) 3.125 MG tablet TAKE 1 TAB BY MOUTH TWICE A DAY 5/2/17   Casey Townsend MD   atorvastatin (LIPITOR) 80 MG tablet TAKE 1 TAB BY MOUTH ONCE A DAY 5/2/17   Casey Townsend MD   diphenhydrAMINE (BENADRYL) 25 MG capsule TAKE 1 TABLET BY MOUTH NIGHTLY AS NEEDED (INSOMNIA) 3/16/17   Casey Townsend MD   pantoprazole (PROTONIX) 40 MG tablet Take 1 tablet by mouth 2 times daily 3/7/17   Casey Townsend MD   Insulin Pen Needle 31G X 6 MM MISC 1 each by Does not apply route daily 3/6/17   Casey Townsend MD   clopidogrel (PLAVIX) 75 MG tablet TAKE 1 TAB BY MOUTH ONCE A DAY 2/1/17   Mary Gwen Baarhona MD   Melatonin 10 MG TABS Take 10 mg by mouth nightly 1/4/17   Carroll Marcus MD   isosorbide mononitrate (IMDUR) 30 MG extended release tablet TAKE 1 TABLET BY MOUTH DAILY 9/30/16   Carroll Marcus MD   citalopram (CELEXA) 40 MG tablet TAKE 1 TAB BY MOUTH ONCE A DAY 9/7/16   Carroll Marcus MD   furosemide (LASIX) 20 MG tablet Take 1 tablet by mouth as needed (leg swelling) 9/6/16   Mary Barahona MD   Elastic Bandages & Supports (ANKLE BRACE/HIGH PERFORMANCE L) [facility] [place] brace. Diagnosis: right ankle injury 6/15/16   Carroll Marcus MD   ferrous sulfate 325 (65 FE) MG tablet Take 325 mg by mouth daily (with breakfast)    Historical Provider, MD   metFORMIN (GLUCOPHAGE) 1000 MG tablet Take 1 tablet by mouth 2 times daily (with meals) 3/16/16   Carroll Marcus MD   [employer] SHORT PEN NEEDLES 31G X 8 MM MISC AS DIRECTED 2/5/16   Elizabeth Sánchez CNP   docusate sodium (COLACE) 100 MG capsule Take 1 capsule by mouth 2 times daily Please use while taking Percocet 9/10/15   Mat Felix MD   SYMBICORT 160-4.5 MCG/ACT AERO   2 puffs As needed for sob 5/28/15   Historical Provider, MD   ipratropium (ATROVENT) 0.02 % nebulizer solution  9/15/14   Historical Provider, MD   magnesium oxide (MAG-OX) 400 MG tablet Take 400 mg by mouth 2 times daily. Historical Provider, MD   insulin regular (HUMULIN R;NOVOLIN R) 100 UNIT/ML injection   Inject 10 Units into the skin See Admin Instructions Indications: 10 units daily @ 6 pm Per sliding scale    Historical Provider, MD   OXYGEN Inhale 2 L into the lungs. Historical Provider, MD   nystatin (MYCOSTATIN) powder Apply 3 times daily. 7/30/13   Haydee Blank MD   albuterol (PROVENTIL) (2.5 MG/3ML) 0.083% nebulizer solution Take 2.5 mg by nebulization every 6 hours as needed.       Historical Provider, MD       REVIEW OF SYSTEMS    (2-9 systems for level 4, 10 or more for level 5)      Review of Systems   Constitutional: Positive for activity change, fatigue and fever. Negative for chills and unexpected weight change. HENT: Negative for trouble swallowing. Eyes: Negative for visual disturbance. Respiratory: Positive for cough and chest tightness. Negative for shortness of breath. Cardiovascular: Positive for chest pain and palpitations. Negative for leg swelling. Gastrointestinal: Positive for nausea. Negative for abdominal pain, constipation, diarrhea and vomiting. Genitourinary: Negative for dysuria, flank pain and hematuria. Musculoskeletal: Positive for neck pain and neck stiffness. Negative for back pain. Neurological: Positive for light-headedness, numbness and headaches. Negative for dizziness and weakness. Psychiatric/Behavioral: Negative for confusion. PHYSICAL EXAM   (up to 7 for level 4, 8 or more for level 5)      INITIAL VITALS:   BP (!) 146/69   Pulse 64   Temp 96.8 °F (36 °C) (Oral)   Resp 19   Ht 5' 2\" (1.575 m)   Wt 248 lb (112.5 kg)   SpO2 99%   BMI 45.36 kg/m²     Physical Exam   Constitutional: She is oriented to person, place, and time. She appears well-developed and well-nourished. No distress. HENT:   Head: Normocephalic and atraumatic. Mouth/Throat: Oropharynx is clear and moist. No oropharyngeal exudate. Eyes: EOM are normal. Pupils are equal, round, and reactive to light. Neck: Normal range of motion. Neck supple. No JVD present. Tender to palpation left paraspinal cervical region and left occipital region, negative spurling test   Cardiovascular: Normal rate, regular rhythm, normal heart sounds and intact distal pulses. Exam reveals no gallop and no friction rub. No murmur heard. Pulmonary/Chest: Effort normal and breath sounds normal. No respiratory distress. She has no wheezes. She has no rales. She exhibits no tenderness. Abdominal: Soft. There is no tenderness. There is no rebound and no guarding. Musculoskeletal: Normal range of motion. She exhibits no edema.    No calf mg/dL    Sodium 137 135 - 144 mmol/L    Potassium 4.4 3.7 - 5.3 mmol/L    Chloride 99 98 - 107 mmol/L    CO2 24 20 - 31 mmol/L    Anion Gap 14 9 - 17 mmol/L    GFR Non-African American >60 >60 mL/min    GFR African American >60 >60 mL/min    GFR Comment          GFR Staging NOT REPORTED    CBC Auto Differential   Result Value Ref Range    WBC 8.7 3.5 - 11.0 k/uL    RBC 4.25 4.0 - 5.2 m/uL    Hemoglobin 12.6 12.0 - 16.0 g/dL    Hematocrit 39.0 36 - 46 %    MCV 91.8 80 - 100 fL    MCH 29.7 26 - 34 pg    MCHC 32.3 31 - 37 g/dL    RDW 14.3 12.5 - 15.4 %    Platelets 572 054 - 768 k/uL    MPV 10.3 6.0 - 12.0 fL    Differential Type NOT REPORTED     Seg Neutrophils 62 %    Lymphocytes 27 %    Monocytes 8 %    Eosinophils % 2 %    Basophils 1 %    Segs Absolute 5.40 1.8 - 7.7 k/uL    Absolute Lymph # 2.30 1.0 - 4.8 k/uL    Absolute Mono # 0.70 0.1 - 1.2 k/uL    Absolute Eos # 0.20 0.0 - 0.4 k/uL    Basophils # 0.00 0.0 - 0.2 k/uL    WBC Morphology NOT REPORTED     RBC Morphology NOT REPORTED     Platelet Estimate NOT REPORTED    TSH without Reflex   Result Value Ref Range    TSH 3.27 0.30 - 5.00 mIU/L   POCT troponin   Result Value Ref Range    POC Troponin I 0.00 0.00 - 0.10 ng/mL    POC Troponin Interp       The Troponin-I (POC) results cannot be compared to the Troponin-T results. RADIOLOGY:  XR CHEST STANDARD (2 VW)   Final Result   No evidence for acute cardiopulmonary pathology. CT Head WO Contrast   Final Result   Stable noncontrast examination of the brain without CT evidence of acute   intracranial abnormality and chronic findings as described.                EKG  EKG Interpretation    Interpreted by emergency department physician    Rhythm: normal sinus   Rate: normal  Axis: normal  Ectopy: none  Conduction: normal  ST Segments: no acute change  T Waves: no acute change  Q Waves: none    Clinical Impression: no acute changes    [place]    All EKG's are interpreted by the Emergency score 5. CT head negative. Pt still complaining of headache and lightheadedness. Will treat with compazine and benadryl. 4:46 PM  Spoke with Dr. Yazan Jensen for [facility] who states they will see the patient in ETU. Patient will be admitted for cardiology consult and possible stress test.    4:52 PM  Patient with allergic skin reaction redness to compazine. Will give benadryl and pepcid. No airway issues. 5:22 PM  Cardiac workup unremarkable. Will admit to ETU for cardiac workup. Dr. Yazan Jensen stated he will evaluate her prior to ordering lexiscan. PROCEDURES:  None    CONSULTS:  IP CONSULT TO CARDIOLOGY  IP CONSULT TO CARDIOLOGY    CRITICAL CARE:  None    FINAL IMPRESSION      1. Chest pain, unspecified type          DISPOSITION / PLAN     DISPOSITION Decision to admit    PATIENT REFERRED TO:  Cheryl Pascal MD  [address] [address]  [phone]            DISCHARGE MEDICATIONS:  New Prescriptions    No medications on file       Gricelda Aviles MD  Emergency Medicine Resident    (Please note that portions of this note were completed with a voice recognition program.  Efforts were made to edit the dictations but occasionally words are mis-transcribed. )       Gricelda Aviles MD  Resident  10/13/17 [id number]       Gricelda Aviles MD  Resident  10/13/17 0605

## 2017-10-14 VITALS
BODY MASS INDEX: 45.64 KG/M2 | OXYGEN SATURATION: 94 % | HEIGHT: 62 IN | DIASTOLIC BLOOD PRESSURE: 44 MMHG | SYSTOLIC BLOOD PRESSURE: 103 MMHG | TEMPERATURE: 98.3 F | WEIGHT: 248 LBS | HEART RATE: 70 BPM | RESPIRATION RATE: 17 BRPM

## 2017-10-14 LAB
EKG ATRIAL RATE: 59 BPM
EKG ATRIAL RATE: 63 BPM
EKG P AXIS: 63 DEGREES
EKG P AXIS: 77 DEGREES
EKG P-R INTERVAL: 112 MS
EKG P-R INTERVAL: 122 MS
EKG Q-T INTERVAL: 378 MS
EKG Q-T INTERVAL: 418 MS
EKG QRS DURATION: 68 MS
EKG QRS DURATION: 68 MS
EKG QTC CALCULATION (BAZETT): 386 MS
EKG QTC CALCULATION (BAZETT): 413 MS
EKG R AXIS: -15 DEGREES
EKG R AXIS: 7 DEGREES
EKG T AXIS: 71 DEGREES
EKG T AXIS: 78 DEGREES
EKG VENTRICULAR RATE: 59 BPM
EKG VENTRICULAR RATE: 63 BPM
GLUCOSE BLD-MCNC: 121 MG/DL (ref 65–105)

## 2017-10-14 PROCEDURE — 82947 ASSAY GLUCOSE BLOOD QUANT: CPT

## 2017-10-14 PROCEDURE — 6360000002 HC RX W HCPCS: Performed by: EMERGENCY MEDICINE

## 2017-10-14 PROCEDURE — 94640 AIRWAY INHALATION TREATMENT: CPT

## 2017-10-14 PROCEDURE — 6370000000 HC RX 637 (ALT 250 FOR IP): Performed by: EMERGENCY MEDICINE

## 2017-10-14 PROCEDURE — 2500000003 HC RX 250 WO HCPCS: Performed by: EMERGENCY MEDICINE

## 2017-10-14 PROCEDURE — G0378 HOSPITAL OBSERVATION PER HR: HCPCS

## 2017-10-14 RX ORDER — DEXTROSE MONOHYDRATE 25 G/50ML
12.5 INJECTION, SOLUTION INTRAVENOUS PRN
Status: CANCELLED | OUTPATIENT
Start: 2017-10-14

## 2017-10-14 RX ORDER — DEXTROSE MONOHYDRATE 50 MG/ML
100 INJECTION, SOLUTION INTRAVENOUS PRN
Status: CANCELLED | OUTPATIENT
Start: 2017-10-14

## 2017-10-14 RX ORDER — NICOTINE POLACRILEX 4 MG
15 LOZENGE BUCCAL PRN
Status: CANCELLED | OUTPATIENT
Start: 2017-10-14

## 2017-10-14 RX ADMIN — LOSARTAN POTASSIUM 25 MG: 25 TABLET, FILM COATED ORAL at 09:53

## 2017-10-14 RX ADMIN — MOMETASONE FUROATE AND FORMOTEROL FUMARATE DIHYDRATE 2 PUFF: 100; 5 AEROSOL RESPIRATORY (INHALATION) at 07:42

## 2017-10-14 RX ADMIN — IPRATROPIUM BROMIDE 0.5 MG: 0.5 SOLUTION RESPIRATORY (INHALATION) at 07:42

## 2017-10-14 RX ADMIN — GABAPENTIN 100 MG: 100 CAPSULE ORAL at 09:53

## 2017-10-14 RX ADMIN — ACETAMINOPHEN 650 MG: 325 TABLET ORAL at 10:01

## 2017-10-14 RX ADMIN — ISOSORBIDE MONONITRATE 30 MG: 30 TABLET ORAL at 09:53

## 2017-10-14 RX ADMIN — FERROUS SULFATE TAB EC 325 MG (65 MG FE EQUIVALENT) 325 MG: 325 (65 FE) TABLET DELAYED RESPONSE at 09:53

## 2017-10-14 RX ADMIN — CARVEDILOL 3.12 MG: 3.12 TABLET, FILM COATED ORAL at 09:53

## 2017-10-14 RX ADMIN — IPRATROPIUM BROMIDE 0.5 MG: 0.5 SOLUTION RESPIRATORY (INHALATION) at 11:20

## 2017-10-14 RX ADMIN — METFORMIN HYDROCHLORIDE 1000 MG: 500 TABLET ORAL at 09:52

## 2017-10-14 RX ADMIN — MICONAZORB AF ANTIFUNGAL POWDER: 1.42 POWDER TOPICAL at 09:52

## 2017-10-14 RX ADMIN — DOCUSATE SODIUM 100 MG: 100 CAPSULE, LIQUID FILLED ORAL at 09:53

## 2017-10-14 RX ADMIN — PANTOPRAZOLE SODIUM 40 MG: 40 TABLET, DELAYED RELEASE ORAL at 09:52

## 2017-10-14 RX ADMIN — CITALOPRAM 20 MG: 20 TABLET, FILM COATED ORAL at 09:53

## 2017-10-14 RX ADMIN — AMLODIPINE BESYLATE 5 MG: 5 TABLET ORAL at 09:54

## 2017-10-14 ASSESSMENT — PAIN SCALES - GENERAL: PAINLEVEL_OUTOF10: 7

## 2017-10-14 NOTE — SIGNIFICANT EVENT
Progress Notes  Encounter Date: 10/13/2017  Lyndon Rogers MD   Cardiology   Expand All Collapse All    []Hide copied text  Katlin Cano     Date of visit: 10/13/2017                                           YOB: 1954  Age: 58 y.o.                                                                 MRN: [id number]  _______________________      Patient Active Problem List   Diagnosis    Acute erosive gastritis    Headache    Anxiety    Chest pain    Chronic obstructive pulmonary disease (HCC)    Dizziness    Weakness    Gastroesophageal reflux disease    Hiatal hernia    Hyperlipidemia    Essential hypertension    Iron deficiency anemia    Neuropathy    Numbness    Obstructive sleep apnea syndrome in adult    Paresthesia    Stricture of esophagus    Presence of coronary angioplasty implant and graft    Rectal bleeding    Atherosclerotic heart disease of native coronary artery with angina pectoris (HCC)    SOB (shortness of breath) on exertion    Pre-op exam      _______________________       Allergies   Allergen Reactions    Hay Fever And Allergy Relief Itching      _______________________  Current Medications          Current Outpatient Prescriptions   Medication Sig Dispense Refill    albuterol (PROVENTIL HFA;VENTOLIN HFA) 90 mcg/actuation inhaler Inhale.        ALPRAZOLAM ORAL Take 0.5 mg by mouth nightly.         aspirin 81 mg Take 81 mg by mouth daily.        ATORVASTATIN CALCIUM (LIPITOR ORAL) Take 40 mg by mouth daily.         brimonidine-timolol (COMBIGAN) 0.2-0.5 % ophthalmic solution daily.        CARVEDILOL ORAL Take 25 mg by mouth 2 (two) times a day.         CLOPIDOGREL BISULFATE (PLAVIX ORAL) Take 75 mg by mouth daily.         ISOSORBIDE MONONITRATE ORAL Take 60 mg by mouth daily.         LISINOPRIL ORAL Take 10 mg by mouth daily.         nitroglycerin (NITROSTAT) 0.4 MG SL tablet 0.4 mg.        ranitidine (ZANTAC) 150 mg tablet Take 150 mg by mouth.         Asthma      CAD (coronary artery disease)      Clotting disorder (HCC)       taking plavix and aspirin     COPD (chronic obstructive pulmonary disease) (HCC)      Diverticulitis of colon      Dizziness      Fibromyalgia, primary      Glaucoma      Hemorrhoids      Hyperlipidemia      Hypertension      Kidney calculi      Myocardial infarction (HCC)      Sleep apnea      Syncope           No Data Recorded  No Data Recorded  No Data Recorded  _______________________  Surgical History         Past Surgical History:   Procedure Laterality Date    ANKLE SURGERY        CARDIAC CATHETERIZATION        CORONARY STENT PLACEMENT        TONSILLECTOMY        TOTAL HIP ARTHROPLASTY             _______________________        Family History   Problem Relation Age of Onset    Heart attack Brother 61      _______________________  Social History   Social History            Social History    Marital status:        Spouse name: N/A    Number of children: N/A    Years of education: N/A          Occupational History    Not on file.             Social History Main Topics    Smoking status: Former Smoker    Smokeless tobacco: Never Used    Alcohol use Yes         Comment: socially     Drug use: No    Sexual activity: Not on file           Other Topics Concern    Caffeine Use Yes          Social History Narrative    No narrative on file         _______________________  Review of Systems  Review of Systems   Cardiovascular: Positive for chest pain and dyspnea on exertion. Respiratory: Positive for shortness of breath. Hematologic/Lymphatic: Negative for bleeding problem. Skin: Negative for itching and rash. Musculoskeletal: Positive for arthritis, joint pain and stiffness. Gastrointestinal: Negative for bloating, constipation and diarrhea.    Neurological: Positive for dizziness and light-headedness.      CARDIOVASCULAR: Please review HPI.  _______________________  Physical ABNORMAL EKG.     Advised to go to ER, if any worsening CP - not relieved with SL NTG per pathway & he understands.     Will check pre op Nuclear stress test & 2 D ECHO as part of further pre op cardiac work up - given c/o worsening CP & SOB, significant ASCAD.     If no large areas of stress-induced myocardial ischemia on preoperative cardiac testing, patient will be considered INTERMEDIATE to HIGH cardiac risk for any perioperative cardiac complications and no absolute cardiac contraindications. On the other hand, if patient has significant large areas of stress-induced myocardial ischemia, patient will require preoperative cardiac catheterization potentially and may need to delay the procedure.   Discussed these issues with patient today who voices understanding.     Suggest continuation of ALL current cardiac meds and continued aggressive cardiac risk factor modification.     Advised to follow up closely up in the office - requesting to see Dr Olena Johnson - will arrange follow up in 2-4 weeks with Dr Awilda Weaver.     Thank you.  _______________________  [address] This Encounter   Procedures    Nuc stress Lexiscan    POCT EKG    Echo complete W/O contrast      _______________________  FOLLOW UP  Return in about 2 weeks (around 10/27/2017) for [address] Eneida Webster.     PCP: Brent Wilson MD  Referring Physician: Antoine Freire MD  [facility], [address] [place] -  [address]     [facility], [facility]  10/13/17 [address] [facility],[address] MD, [facility] [place] [facility]

## 2017-10-14 NOTE — H&P
[facility]  CDU / OBSERVATION eNCOUnter  Resident Note     Pt Name: Senait Reyes  MRN: [id number]  [place] 1950  Date of evaluation: 10/14/17  Patient's PCP is :  Giancarlo Mccracken MD    [address] [place] [address]       Chief Complaint   Patient presents with    Chest Pain    Headache     HISTORY OF PRESENT ILLNESS    Senait Reyes is a 78 y.o. female who presented to the ED with acute 7/10 midsternal intermittent sharp chest pain x 2 days that radiates to the L arm with associated diaphoresis and nausea; no SOB, no modifying factors. She reports hx of cardiac ablation secondary to irregular heart rhythm, and has a loop recorder. Has hx of HTN, hyperlipidemia, COPD on 2.5L NC at home. HS 5. Patient also c/o L posterior occipital headache, L sided neck pain, and lightheadedness, with a syncopal episode; no fall, no head injury. ED work up was unremarkable.    Dr. Glen Trevino from [facility] was consulted in the ED, states he will see the patient today for possible stress test.     Location/Symptom: midsternal chest pain  Timing/Onset: 2 days  Provocation: none  Quality: sharp  Radiation: L arm  Severity: 7/10  Timing/Duration: intermittent  Modifying Factors: none    REVIEW OF SYSTEMS       General ROS - No fevers, No malaise, +diaphoresis  Ophthalmic ROS - No discharge, No changes in vision  ENT ROS -  No sore throat, No rhinorrhea,   Respiratory ROS - no shortness of breath, no cough, no  wheezing  Cardiovascular ROS - + chest pain, no dyspnea on exertion  Gastrointestinal ROS - No abdominal pain, + nausea, no vomiting, no change in bowel habits, no black or bloody stools  Genito-Urinary ROS - No dysuria, trouble voiding, or hematuria  Musculoskeletal ROS - No myalgias, No arthalgias, +neck pain  Neurological ROS - +headache, +lightheaded, +syncopal episode, No focal weakness, no loss of sensation  Dermatological ROS - No lesions, No rash     (PQRS) Advance directives on face sheet per hospital policy. No change unless specifically mentioned in chart    PAST MEDICAL HISTORY    has a past medical history of Allergic rhinitis; Anxiety; Arthritis; Asthma; Atrial fibrillation ([facility] [address] Back pain; CAD (coronary artery disease); Caffeine use; CHF (congestive heart failure) ([address] COPD (chronic obstructive pulmonary disease) ([facility] [address] Degeneration of lumbar or lumbosacral intervertebral disc; Depression; DM (diabetes mellitus) ([facility] [address] Emphysema of lung ([facility] [address] Gastritis; GERD (gastroesophageal reflux disease); Hearing loss; Hematuria; Hiatal hernia; HTN (hypertension), benign; Hypertriglyceridemia; Incontinence of urine; Knee arthropathy; Migraine headache; Mumps; On home oxygen therapy; HIGINIO on CPAP; Otitis media; Stroke ([facility] [address] Tinnitus; Type II or unspecified type diabetes mellitus without mention of complication, not stated as uncontrolled; UTI (lower urinary tract infection); and Varicose vein. I have reviewed the past medical history with the patient and it is pertinent to this complaint. SURGICAL HISTORY      has a past surgical history that includes Cholecystectomy (2007); Carpal tunnel release (Right); Tympanoplasty; Dilation & curettage (1979); Cystocopy (9/25/2013); Cataract removal with implant (Bilateral); Tonsillectomy; Incontinence surgery; ablation of dysrhythmic focus (2000); Upper gastrointestinal endoscopy; eye surgery; Tubal ligation; other surgical history (09/10/15); Insertable Cardiac Monitor (12/2015); Tympanoplasty; Colonoscopy; Colonoscopy (04/10/2017); colsc flx w/removal lesion by hot bx forceps (N/A, 4/10/2017); and Tympanostomy tube placement (08/21/2017).   I have reviewed and agree with Surgical History entered    CURRENT MEDICATIONS       albuterol (PROVENTIL) nebulizer solution 2.5 mg Q6H PRN   amLODIPine (NORVASC) tablet 5 mg Daily   atorvastatin (LIPITOR) tablet 80 mg Nightly   carvedilol (COREG) tablet 3.125 mg BID   citalopram (CELEXA) tablet 20 mg BID   clopidogrel (PLAVIX) tablet 75 mg Once   diphenhydrAMINE (BENADRYL) tablet 25 mg Nightly PRN   docusate sodium (COLACE) capsule 100 mg BID   ferrous sulfate EC tablet 325 mg Daily with breakfast   furosemide (LASIX) tablet 20 mg PRN   gabapentin (NEURONTIN) capsule 100 mg TID   ipratropium (ATROVENT) 0.02 % nebulizer solution 0.5 mg 4x daily   isosorbide mononitrate (IMDUR) extended release tablet 30 mg Daily   losartan (COZAAR) tablet 25 mg Daily   melatonin tablet 10 mg Nightly   metFORMIN (GLUCOPHAGE) tablet 1,000 mg BID WC   miconazole (MICOTIN) 2 % powder BID   pantoprazole (PROTONIX) tablet 40 mg BID   mometasone-formoterol (DULERA) 100-5 MCG/ACT inhaler 2 puff BID   topiramate (TOPAMAX) tablet 100 mg Nightly   sodium chloride flush 0.9 % injection 10 mL 2 times per day   sodium chloride flush 0.9 % injection 10 mL PRN   acetaminophen (TYLENOL) tablet 650 mg Q4H PRN   ondansetron (ZOFRAN) injection 4 mg Q8H PRN   insulin lispro (HUMALOG) injection vial 0-12 Units TID WC   insulin lispro (HUMALOG) injection vial 0-6 Units Nightly     All medication charted and reviewed. ALLERGIES     is allergic to robitussin [guaifenesin]; codeine; compazine [prochlorperazine maleate]; iodides; morphine; reglan [metoclopramide]; avelox [moxifloxacin hcl in nacl]; cipro xr; and sulfa antibiotics. FAMILY HISTORY     indicated that her mother is . She indicated that her father is . She indicated that the status of her maternal grandmother is unknown.      family history includes Diabetes in her maternal grandmother and mother; Heart Disease in her mother; Stomach Cancer in her father. The patient denies any pertinent family history. I have reviewed and agree with the family history entered. I have reviewed the Family History and it is not significant to the case    SOCIAL HISTORY      reports that she quit smoking about 17 years ago. Her smoking use included Cigarettes. She has a 123.00 pack-year smoking history.  She has abnormality. SOFT TISSUES/SKULL: No acute abnormality of the visualized skull or soft tissues. Stable noncontrast examination of the brain without CT evidence of acute intracranial abnormality and chronic findings as described. LABS:  I have reviewed and interpreted all available lab results. Labs Reviewed   BASIC METABOLIC PANEL - Abnormal; Notable for the following:        Result Value    Glucose 133 (*)     All other components within normal limits   BRAIN NATRIURETIC PEPTIDE   CBC WITH AUTO DIFFERENTIAL   TSH WITHOUT REFLEX   POCT TROPONIN   POCT TROPONIN   POCT TROPONIN   POC GLUCOSE FINGERSTICK   POCT TROPONIN     SCREENING TOOLS:    HEART Risk Score for Chest Pain Patients   History and Physical Exam Suspicion Level  (Nausea, Vomiting, Diaphoresis, Radiation, Exertion)   Slightly Suspicious (0 pts)   Moderately Suspicious (1 pt)   Highly Suspicious (2 pts)   EKG Interpretation   Normal (0 pts)   Non-Specific Repolarization Disturbance (1 pt)   Significant ST-Depression (2 pts)   Age of Patient (in years)   = 39 (0 pts)   46-64 (1 pt)   = 65 (2 pts)   Risk Factors   No Risk Factors (0 pts)   1-2 Risk Factors (1 pt)   = 3 Risk Factors (2 pts)   Risk Factors Include:   Hypercholesterolemia   Hypertension   Diabetes Mellitus   Cigarette smoking   Positive family history   Obesity   CAD   (SLE, CKDz, HIV, Cocaine abuse)   Troponin Levels   = Normal Limit (0 pts)   1-3 Times Normal Limit (1 pt)   > 3 Times Normal Limit (2 pts)  TOTAL:5    Percent Risk for Major Adverse Cardiac Event (MACE)  0-3 pts indicates low risk for MACE   2.5% (DISCHARGE)   4-7 pts indicates moderate risk for MACE  20.3% (OBS)  8-10 pts indicates high risk for MACE  72.7% (EARLY INVASIVE TX)    CDU IMPRESSION / Lila Solano is a 78 y.o. female who presents with     1.   Acute 7/10 midsternal intermittent sharp chest pain that radiates to the L arm with associated diaphoresis and nausea   -cardiac consultation: will follow recommendations    2. Acute L posterior occipital headache, L sided neck pain likely secondary to musculoskeletal pain   -pain resolved, will follow up with PCP    3. Acute Lightheadedness, with a syncopal episode secondary to unknown etiology   -cardiology consultation: will follow recommendations    · IP CONSULT TO CARDIOLOGY  · IP CONSULT TO CARDIOLOGY  · Further workup and evaluation   · Follow up recommendations     · Continue home meds, pain control   · Monitor vitals, labs, imaging     DISPO: pending consults and clinical improvement     CONSULTS:    IP CONSULT TO CARDIOLOGY  IP CONSULT TO CARDIOLOGY    PROCEDURES:  Not indicated     PATIENT REFERRED TO:    Lynnette Weller MD  [address]  [id number] [facility] [address]  [phone]          --  Kecia Chan DO   Emergency Medicine Resident     This dictation was generated by voice recognition computer software. Although all attempts are made to edit the dictation for accuracy, there may be errors in the transcription that are not intended.

## 2017-10-14 NOTE — SIGNIFICANT EVENT
[place] Jud    Date of visit:  01/09/2017  YOB: 1950  Age:  78years old   MRN:  [id number]  ____________________________  CURRENT DIAGNOSES  1. Atherosclerotic Heart Disease Of Native Coronary Artery Without Angina Pectoris  2. Shortness of breath  3. Presence of other cardiac implants and grafts  4. Emphysema, unspecified  5. Morbid (severe) obesity due to excess calories  6. Other hyperlipidemia  7. Other transient cerebral ischemic attacks and related syndromes  8. Essential (primary) hypertension  9. Tachycardia, unspecified  10. Supraventricular tachycardia  ____________________________  ALLERGIES   Avelox, Intolerance-unknown   Ciprofloxacin, Intolerance-unknown   Codeine, Intolerance-unknown   Guaifenesin, Intolerance-unknown   Iodine, Intolerance-unknown   Morphine, Intolerance-unknown   Moxifloxacin, Intolerance-unknown   Reglan, Intolerance-unknown   Sulfa (Sulfonamide Antibiotics), Intolerance-unknown  ____________________________  MEDICATIONS  1. Glucophage 500 mg Tablet, 2 by mouth twice daily  2. Lasix 20 mg Tablet, as needed  3. Celexa 20 mg tablet, 1 by mouth twice daily  4. magnesium oxide 400 mg tablet, 1 every 12 hours  5. Coreg 3.125 mg tablet, 1 every 12 hours  6. ferrous sulfate 325 mg (65 mg iron) tablet, 1 by mouth daily  7. Vitamin B-12 1,000 mcg tablet, 1 by mouth daily  8. Cozaar 25 mg tablet, 1 by mouth daily  9. oxycodone-acetaminophen 5-325 mg tablet, as needed  10. Symbicort 160-4.5 mcg/actuation HFA aerosol inhaler, as directed  11. Lantus 100 unit/mL solution, as directed  12. albuterol sulfate HFA 90 mcg/actuation aerosol inhaler, as needed  13. clopidogrel 75 mg tablet, 1 by mouth daily  14. ipratropium bromide 0.02 % solution for inhalation, Take as Directed  15. nystatin 150 million unit oral powder, Take as Directed  16. atorvastatin 80 mg tablet, 1 by mouth daily  17. Protonix 40 mg tablet,delayed release, 1 by mouth daily  18.  topiramate 100 mg tablet, 1 by mouth daily  19. Humulin R 100 unit/mL injection solution, Take as Directed  20. Oxygen -, Take as Directed  ____________________________  CHIEF COMPLAINTS  Followup of Essential (primary) hypertension  ____________________________  HISTORY OF PRESENT ILLNESS  Dominga Butcher presented to our office on 01/09/2017 for follow-up visit regarding chest pain and recent hospitalization discharge. Since hospital discharge she is doing well with no more recurrent left breast pain and left arm pain. She stated that she is doing a lot better today. No palpitations, pedal edema, CHF, or syncopal symptoms. She has COPD/asthma/emphysema with associated exertional dyspnea.     She lost 10 pounds since last office visit.  ____________________________  PAST HISTORY    Past Medical Illnesses:  hyperlipidemia, hypertension, HIGINIO cpap Julio, obesity, COPD/Emphysema/Asthma, -- uses Home O2 -- sees Dr Dominic Desai, DM, arthritis (osteo), dyslipidemia, GERD, TIA, Seizures, SOB, Anxiety Depression, obesity, TIA;    Past Cardiac Illnesses:  CAD on cath 7/07 (small diag br disease), chest pain, Arrythmia - Palp, Rare PAC/PVC, PSVT, Repeat cath 4/08 -- normal coronaries, LVEF >55%, 60% small Diag br disease on CATH Dec 2012, palpitations, chest pain    Surgical Procedures:  tonsillectomy, tubal ligation, cholecystectomy, bladder surgery 2013    NYHA Classification:  II    Luquillo Angina Classification:  Class 0: Asymptomatic  Cardiology Procedures-Invasive:  cardiac cath (left) 2/03, EP study 9/01, cardiac cath (left) 7/07, 4/08, Loop recorder implant 12/15   Cardiology Procedures-Noninvasive:  persantine/dipyridamole cardiolite 8/04, dobutamine stress echocardiogram 1/03, persantine/dipyridamole cardiolite 9/01, echo 6/16,  9/01, myocardial perfusion imaging (Nuclear) 3/07, 3/08, venous Doppler lower extremities 2/14, 3/08, holter monitor 4/13 4/08, event monitor 8/15, carotid doppler 8/15   Cardiac Cath Results:  normal coronaries, LVEF >55% -- 2/6/03, Repeat cath 7/18/07 -- 80% first diagonal branch of LAD, mild LAD disease, LVEF 60%. Repeat CATH Dec 2012 - no change    Left Ventricular Ejection Fraction:  LVEF of 55% documented via echocardiogram on 06/06/2016  ___  FAMILY HISTORY  Father -- Stomach cancer  Mother -- Diabetes mellitus  _______________________  CARDIAC RISK FACTORS   Tobacco Abuse: used to smoke, but quit; Family History of Heart Disease: POSITIVE;  Hyperlipidemia: POSITIVE;  Hypertension: POSITIVE;  Diabetes Mellitus: POSITIVE;  Prior History of Heart Disease: POSITIVE;  Obesity:POSITIVE, BMI > 25 Out of range, BMI>30 (Obesity); Sedentary Life Style:POSITIVE;  Age:POSITIVE;  Menopausal:positive   ____________________________   SOCIAL HISTORY     Alcohol Use - denies drinking;  Smoking - used to smoke but quit and 2001 -- after smoking 31-3 PPD X 41 yrs; Diet - low cholesterol, low sodium (less than 2 grams), low fat Diet and diabetic diet; Lifestyle - single and active lifestyle; Exercise - exercise is limited due to physical disability, walking and on 24 Hr O2; Occupation - disabled; Illicit Drug Use - denies the abuse of prescription or nonprescription drugs; Job Description - disability;    _______________________  REVIEW OF SYSTEMS  GENERAL: POSITIVE for, weight loss of 10 lbs  INTEGUMENTARY: denies any change in hair or nails, rashes, or skin lesions. RESPIRATORY: POSITIVE for, history of asthma/COPD/emphysema, dyspnea with exertion,   CARDIOVASCULAR: negative for, chest pain, palpitations, edema, syncopal episodes, states \"feeling a lot better today\", was in the hsop in Dec for lt lat CP & numb lt arm - negative work up & hosp for 2 days - improved since  GASTROINTESTINAL: denies ulcer disease, hematochezia or melena. GENITOURINARY-FEMALE: negative for, hematuria  MUSCULOSKELETAL: denies any history of venous insufficiency, arthritic symptoms or back problems.   NEUROLOGICAL: no recent

## 2017-10-14 NOTE — CONSULTS
TAKE 1 TAB BY MOUTH TWICE A DAY 5/2/17   Yissel Palacios MD   atorvastatin (LIPITOR) 80 MG tablet TAKE 1 TAB BY MOUTH ONCE A DAY 5/2/17   Yissel Palacios MD   diphenhydrAMINE (BENADRYL) 25 MG capsule TAKE 1 TABLET BY MOUTH NIGHTLY AS NEEDED (INSOMNIA) 3/16/17   Yissel Palacios MD   pantoprazole (PROTONIX) 40 MG tablet Take 1 tablet by mouth 2 times daily 3/7/17   Yissel Palacios MD   Insulin Pen Needle 31G X 6 MM MISC 1 each by Does not apply route daily 3/6/17   Yissel Palacios MD   clopidogrel (PLAVIX) 75 MG tablet TAKE 1 TAB BY MOUTH ONCE A DAY 2/1/17   Yissel Palacios MD   Melatonin 10 MG TABS Take 10 mg by mouth nightly 1/4/17   Yissel Palacios MD   isosorbide mononitrate (IMDUR) 30 MG extended release tablet TAKE 1 TABLET BY MOUTH DAILY 9/30/16   Yissel Palacios MD   citalopram (CELEXA) 40 MG tablet TAKE 1 TAB BY MOUTH ONCE A DAY 9/7/16   Yissel Palacios MD   furosemide (LASIX) 20 MG tablet Take 1 tablet by mouth as needed (leg swelling) 9/6/16   Mary Ba MD   Elastic Bandages & Supports (ANKLE BRACE/HIGH PERFORMANCE L) [facility] Needs right ankle brace. Diagnosis: right ankle injury 6/15/16   Yissel Palacios MD   ferrous sulfate 325 (65 FE) MG tablet Take 325 mg by mouth daily (with breakfast)    Historical Provider, MD   metFORMIN (GLUCOPHAGE) 1000 MG tablet Take 1 tablet by mouth 2 times daily (with meals) 3/16/16   Yissel Palacios MD   [employer] SHORT PEN NEEDLES 31G X 8 MM MISC AS DIRECTED 2/5/16   Alfonzoalan Gao CNP   docusate sodium (COLACE) 100 MG capsule Take 1 capsule by mouth 2 times daily Please use while taking Percocet 9/10/15   Hector Calero MD   SYMBICORT 160-4.5 MCG/ACT AERO   2 puffs As needed for sob 5/28/15   Historical Provider, MD   ipratropium (ATROVENT) 0.02 % nebulizer solution  9/15/14   Historical Provider, MD   magnesium oxide (MAG-OX) 400 MG tablet Take 400 mg by mouth 2 times daily.     Historical Provider, MD   insulin regular (HUMULIN R;NOVOLIN R) 100 UNIT/ML injection   Inject 10 Units into the skin See Admin Instructions Indications: 10 units daily @ 6 pm Per sliding scale    Historical Provider, MD   OXYGEN Inhale 2 L into the lungs. Historical Provider, MD   nystatin (MYCOSTATIN) powder Apply 3 times daily. 7/30/13   Rambo Shaw MD   albuterol (PROVENTIL) (2.5 MG/3ML) 0.083% nebulizer solution Take 2.5 mg by nebulization every 6 hours as needed.       Historical Provider, MD        [facility] Meds:    Current Facility-Administered Medications   Medication Dose Route Frequency Provider Last Rate Last Dose    albuterol (PROVENTIL) nebulizer solution 2.5 mg  2.5 mg Nebulization Q6H PRN Yonathan Collins MD        amLODIPine (NORVASC) tablet 5 mg  5 mg Oral Daily Yonathan Collins MD        atorvastatin (LIPITOR) tablet 80 mg  80 mg Oral Nightly Yonathan Collins MD   80 mg at 10/13/17 2044    carvedilol (COREG) tablet 3.125 mg  3.125 mg Oral BID Yonathan Collins MD   3.125 mg at 10/13/17 2044    citalopram (CELEXA) tablet 20 mg  20 mg Oral BID Yonathan Collins MD   20 mg at 10/13/17 2043    clopidogrel (PLAVIX) tablet 75 mg  75 mg Oral Once Yonathan Collins MD        diphenhydrAMINE (BENADRYL) tablet 25 mg  25 mg Oral Nightly PRN Yonathan Collins MD        docusate sodium (COLACE) capsule 100 mg  100 mg Oral BID Yonathan Collins MD   100 mg at 10/13/17 2044    ferrous sulfate EC tablet 325 mg  325 mg Oral Daily with breakfast Yonathan Collins MD        furosemide (LASIX) tablet 20 mg  20 mg Oral PRN Yonathan Collins MD        gabapentin (NEURONTIN) capsule 100 mg  100 mg Oral TID Yonathan Collins MD   100 mg at 10/13/17 2044    ipratropium (ATROVENT) 0.02 % nebulizer solution 0.5 mg  0.5 mg Nebulization 4x daily Yonathan Collins MD   0.5 mg at 10/14/17 2183    isosorbide mononitrate (IMDUR) extended release tablet 30 mg  30 mg Oral Daily Yonathan Collins MD        losartan (COZAAR) tablet 25 mg  25 mg Oral Daily Yonathan Collins MD       [facility]

## 2017-10-14 NOTE — PROGRESS NOTES
[facility]  CDU / OBSERVATION eNCOUnter  Attending NOte       I performed a history and physical examination of the patient and discussed management with the resident. I reviewed the residents note and agree with the documented findings and plan of care. Any areas of disagreement are noted on the chart. I was personally present for the key portions of any procedures. I have documented in the chart those procedures where I was not present during the key portions. I have reviewed the nurses notes. I agree with the chief complaint, past medical history, past surgical history, allergies, medications, social and family history as documented unless otherwise noted below. The Family history, social history, and ROS are effectively unchanged since admission unless noted elsewhere in the chart. Plan made in conjunction with cardiology. Patient had cardiology evaluation today. No further changes in terms of management. Patient comfortable with discharge plan.     Olivia Ramos MD  Attending Emergency  Physician

## 2017-10-14 NOTE — PLAN OF CARE
Problem: RESPIRATORY  Intervention: RESPIRATORY THERAPY  BRONCHOSPASM/BRONCHOCONSTRICTION     [x]         IMPROVE AERATION/BREATH SOUNDS  [x]   ADMINISTER BRONCHODILATOR THERAPY AS APPROPRIATE  [x]   ASSESS BREATH SOUNDS  [x]   IMPLEMENT AEROSOL/MDI PROTOCOL  []   PATIENT EDUCATION AS NEEDED

## 2017-10-15 ENCOUNTER — HOSPITAL ENCOUNTER (EMERGENCY)
Age: 67
Discharge: HOME | End: 2017-10-15
Attending: EMERGENCY MEDICINE
Payer: MEDICARE

## 2017-10-15 VITALS
TEMPERATURE: 97.7 F | OXYGEN SATURATION: 97 % | SYSTOLIC BLOOD PRESSURE: 132 MMHG | DIASTOLIC BLOOD PRESSURE: 66 MMHG | RESPIRATION RATE: 16 BRPM | HEART RATE: 83 BPM

## 2017-10-15 DIAGNOSIS — I80.9 PHLEBITIS: Primary | ICD-10-CM

## 2017-10-15 PROCEDURE — 93971 EXTREMITY STUDY: CPT

## 2017-10-15 PROCEDURE — 99284 EMERGENCY DEPT VISIT MOD MDM: CPT

## 2017-10-15 RX ORDER — CEPHALEXIN 500 MG/1
500 CAPSULE ORAL 4 TIMES DAILY
Qty: 28 CAPSULE | Refills: 0 | Status: SHIPPED | OUTPATIENT
Start: 2017-10-15 | End: 2017-10-22

## 2017-10-15 ASSESSMENT — ENCOUNTER SYMPTOMS
COUGH: 0
SHORTNESS OF BREATH: 0
SORE THROAT: 0
CONSTIPATION: 0
NAUSEA: 0
ABDOMINAL PAIN: 0
DIARRHEA: 0
VOMITING: 0
CHEST TIGHTNESS: 0

## 2017-10-15 ASSESSMENT — PAIN DESCRIPTION - DESCRIPTORS: DESCRIPTORS: ACHING;THROBBING

## 2017-10-15 ASSESSMENT — PAIN DESCRIPTION - ORIENTATION: ORIENTATION: LEFT

## 2017-10-15 ASSESSMENT — PAIN DESCRIPTION - LOCATION: LOCATION: HAND

## 2017-10-15 ASSESSMENT — PAIN DESCRIPTION - PAIN TYPE: TYPE: ACUTE PAIN

## 2017-10-15 ASSESSMENT — PAIN SCALES - GENERAL: PAINLEVEL_OUTOF10: 10

## 2017-10-15 NOTE — DISCHARGE SUMMARY
CDU Discharge Summary        Patient:  Kendall Covert  YOB: 1950    MRN: [id number]   Acct: [de-identified]    Primary Care Physician: Sil Gonzalez MD    Admit date:  10/13/2017  2:50 PM  Discharge date: 10/14/2017 12:00 PM    Discharge Diagnoses:     1. Acute 7/10 midsternal intermittent sharp chest pain that radiates to the L arm with associated diaphoresis and nausea secondary to unknown etiology; resolved spontaneously  -patient evaluated by Cardiology, Dr. Nora Jj, no need for stress test at this time, patient to follow up with him in office in 4-6 weeks    2. Acute L posterior occipital headache, L sided neck pain likely secondary to musculoskeletal pain  -pain resolved, will follow up with PCP     3. Acute Lightheadedness, with a syncopal episode secondary to unknown etiology; symptoms resolved  -will follow up with cardiology outpatient    Follow-up:  Call today/tomorrow for a follow up appointment with Sil Gonzalez MD , or return to the Emergency Room with worsening symptoms    Stressed to patient the importance of following up with primary care doctor for further workup/management of symptoms. Pt verbalizes understanding and agrees with plan. Discharge Medication Changes:       Medication List      CONTINUE taking these medications    albuterol (2.5 MG/3ML) 0.083% nebulizer solution  Commonly known as:  PROVENTIL     amLODIPine 5 MG tablet  Commonly known as:  NORVASC  TAKE 1 TAB BY MOUTH ONCE A DAY     Ankle Brace/[employer] L Misc  Needs right ankle brace.  Diagnosis: right ankle injury     atorvastatin 80 MG tablet  Commonly known as:  LIPITOR  TAKE 1 TAB BY MOUTH ONCE A DAY     carvedilol 3.125 MG tablet  Commonly known as:  COREG  TAKE 1 TAB BY MOUTH TWICE A DAY     citalopram 40 MG tablet  Commonly known as:  CELEXA  TAKE 1 TAB BY MOUTH ONCE A DAY     clopidogrel 75 MG tablet  Commonly known as:  PLAVIX  TAKE 1 TAB BY MOUTH ONCE A DAY     Compression Stockings Misc  by Does not apply route Pressure between 20 - 25 . diphenhydrAMINE 25 MG capsule  Commonly known as:  BENADRYL  TAKE 1 TABLET BY MOUTH NIGHTLY AS NEEDED (INSOMNIA)     docusate sodium 100 MG capsule  Commonly known as:  COLACE  Take 1 capsule by mouth 2 times daily Please use while taking Percocet     ferrous sulfate 325 (65 Fe) MG tablet     furosemide 20 MG tablet  Commonly known as:  LASIX  Take 1 tablet by mouth as needed (leg swelling)     gabapentin 100 MG capsule  Commonly known as:  NEURONTIN  Take 1 capsule by mouth 3 times daily     * insulin glargine 100 UNIT/ML injection pen  Commonly known as:  LANTUS SOLOSTAR  Inject 35 Units into the skin nightly     * insulin glargine 100 UNIT/ML injection pen  Commonly known as:  LANTUS SOLOSTAR  Inject 45 Units into the skin every morning     insulin regular 100 UNIT/ML injection  Commonly known as:  HUMULIN R;NOVOLIN R     ipratropium 0.02 % nebulizer solution  Commonly known as:  ATROVENT     isosorbide mononitrate 30 MG extended release tablet  Commonly known as:  IMDUR  TAKE 1 TABLET BY MOUTH DAILY     losartan 25 MG tablet  Commonly known as:  COZAAR  TAKE 1 TAB BY MOUTH ONCE A DAY     magnesium oxide 400 MG tablet  Commonly known as:  MAG-OX     Melatonin 10 MG Tabs  Take 10 mg by mouth nightly     metFORMIN 1000 MG tablet  Commonly known as:  GLUCOPHAGE  Take 1 tablet by mouth 2 times daily (with meals)     nystatin 824682 UNIT/GM powder  Commonly known as:  MYCOSTATIN  Apply 3 times daily. oxyCODONE-acetaminophen 5-325 MG per tablet  Commonly known as:  PERCOCET  Take 1 tablet by mouth every 6 hours as needed for Pain (take one tablet three to four times a day as needed for lower back pain.) .      OXYGEN     pantoprazole 40 MG tablet  Commonly known as:  PROTONIX  Take 1 tablet by mouth 2 times daily     ranitidine 150 MG tablet  Commonly known as:  ZANTAC     SYMBICORT 160-4.5 MCG/ACT Aero  Generic drug:  budesonide-formoterol     topiramate 100 MG 0.0 - 0.2 k/uL    WBC Morphology NOT REPORTED     RBC Morphology NOT REPORTED     Platelet Estimate NOT REPORTED    TSH without Reflex   Result Value Ref Range    TSH 3.27 0.30 - 5.00 mIU/L   POCT troponin   Result Value Ref Range    POC Troponin I 0.00 0.00 - 0.10 ng/mL    POC Troponin Interp       The Troponin-I (POC) results cannot be compared to the Troponin-T results. POCT troponin   Result Value Ref Range    POC Troponin I 0.00 0.00 - 0.10 ng/mL    POC Troponin Interp       The Troponin-I (POC) results cannot be compared to the Troponin-T results. POC Glucose Fingerstick   Result Value Ref Range    POC Glucose 90 65 - 105 mg/dL   POC Glucose Fingerstick   Result Value Ref Range    POC Glucose 121 (H) 65 - 105 mg/dL   EKG 12 Lead   Result Value Ref Range    Ventricular Rate 63 BPM    Atrial Rate 63 BPM    P-R Interval 112 ms    QRS Duration 68 ms    Q-T Interval 378 ms    QTc Calculation (Bazett) 386 ms    P Axis 77 degrees    R Axis 7 degrees    T Axis 78 degrees   EKG 12 Lead   Result Value Ref Range    Ventricular Rate 59 BPM    Atrial Rate 59 BPM    P-R Interval 122 ms    QRS Duration 68 ms    Q-T Interval 418 ms    QTc Calculation (Bazett) 413 ms    P Axis 63 degrees    R Axis -15 degrees    T Axis 71 degrees     Xr Chest Standard (2 Vw)    Result Date: 10/13/2017  EXAMINATION: TWO VIEWS OF THE CHEST 10/13/2017 4:27 pm COMPARISON: 07/12/2017 HISTORY: ORDERING SYSTEM PROVIDED HISTORY: chest pain TECHNOLOGIST PROVIDED HISTORY: Reason for exam:->chest pain FINDINGS: Frontal and lateral views of the chest are submitted for review. The cardiac silhouette is normal in size. Lung parenchyma is clear without focal airspace consolidation, sizeable pleural effusion, or pneumothorax. Trachea is midline. Osseous structures and soft tissues are grossly intact. A loop recorder is present overlying the left anterior chest wall. No evidence for acute cardiopulmonary pathology.      Ct Head Wo

## 2017-10-15 NOTE — ED NOTES
Bed: 07  Expected date:   Expected time:   Means of arrival:   Comments:  ems     Reyes Bent, RN  10/15/17 0776

## 2017-10-15 NOTE — ED NOTES
Vascular called in, waiting for vascular, pt resting on cot respirations even and unlabored     [address] [place] [address], REUBEN  10/15/17 [id number]

## 2017-10-15 NOTE — ED NOTES
Pt arrives to the ED via [facility]  Pt was seen here on Friday and had IV start in her left hand  Pt states that she was given a medication hat caused her itch and her skin be swell  Pt states that the swelling has increased, and has been taking benadryl at home  Pt rates pain a 10/10 at this time  Pt has little movement to the  Left hand  Pt has redden [place] from the area of IV start  Pt on NC at 2 L per home O2  RR is even and on non-labored, NAD noticed   Resident at bedside for examination       [place] REUBEN Ramsey  10/15/17 0248

## 2017-10-15 NOTE — ED PROVIDER NOTES
not stated as uncontrolled; UTI (lower urinary tract infection); and Varicose vein. has a past surgical history that includes Cholecystectomy (2007); Carpal tunnel release (Right); Tympanoplasty; Dilation & curettage (1979); Cystocopy (9/25/2013); Cataract removal with implant (Bilateral); Tonsillectomy; Incontinence surgery; ablation of dysrhythmic focus (2000); Upper gastrointestinal endoscopy; eye surgery; Tubal ligation; other surgical history (09/10/15); Insertable Cardiac Monitor (12/2015); Tympanoplasty; Colonoscopy; Colonoscopy (04/10/2017); colsc flx w/removal lesion by hot bx forceps (N/A, 4/10/2017); and Tympanostomy tube placement (08/21/2017). Social History     Social History    Marital status: Single     Spouse name: N/A    Number of children: N/A    Years of education: N/A     Occupational History    disabled N/A     Social History Main Topics    Smoking status: Former Smoker     Packs/day: 3.00     Years: 41.00     Types: Cigarettes     Quit date: 1/1/2000    Smokeless tobacco: Never Used      Comment: quit in 2000    Alcohol use No    Drug use: No    Sexual activity: Not Currently     Other Topics Concern    Not on file     Social History Narrative    No narrative on file       Patient advised to stop smoking or to avoid tobacco use. Family History   Problem Relation Age of Onset    Diabetes Mother     Heart Disease Mother     Stomach Cancer Father     Diabetes Maternal Grandmother      also aunts and uncles (maternal)        Allergies:  Robitussin [guaifenesin]; Codeine; Compazine [prochlorperazine maleate]; Iodides; Morphine; Reglan [metoclopramide]; Avelox [moxifloxacin hcl in nacl]; Cipro xr; and Sulfa antibiotics    Home Medications:  Prior to Admission medications    Medication Sig Start Date End Date Taking?  Authorizing Provider   cephALEXin (KEFLEX) 500 MG capsule Take 1 capsule by mouth 4 times daily for 7 days 10/15/17 10/22/17 Yes Junito Castañeda DO noted. She is not diaphoretic. No erythema. Psychiatric: Judgment normal.   Nursing note and vitals reviewed. DIFFERENTIAL  DIAGNOSIS     PLAN (LABS / IMAGING / EKG):  Orders Placed This Encounter   Procedures    VL UPPER EXTREMITY VENOUS DUPLEX LEFT       MEDICATIONS ORDERED:  Orders Placed This Encounter   Medications    cephALEXin (KEFLEX) 500 MG capsule     Sig: Take 1 capsule by mouth 4 times daily for 7 days     Dispense:  28 capsule     Refill:  0       DDX: DVT, superficial thrombophlebitis, phlebitis      DIAGNOSTIC RESULTS / EMERGENCY DEPARTMENT COURSE / MDM     LABS:  No results found for this visit on 10/15/17. IMPRESSION: Patient is a 48-year-old female presented for complaint of left hand swelling and problems. Patient was presented to the hospital last Friday and Saturday. She received a Compazine injection on Saturday prior to discharge, IV was removed and she noticed that she had localized hand swelling shortly after that has worsened until today. Physical exam reveals a well-appearing 48-year-old female with normal stable vital signs. There is nonpitting edema to the left upper extremity extending into the wrist.  There is no erythema, warmth or discharge. Plan is to obtain a Doppler of the left upper extremity and reevaluate. RADIOLOGY:  Xr Chest Standard (2 Vw)    Result Date: 10/13/2017  EXAMINATION: TWO VIEWS OF THE CHEST 10/13/2017 4:27 pm COMPARISON: 07/12/2017 HISTORY: ORDERING SYSTEM PROVIDED HISTORY: chest pain TECHNOLOGIST PROVIDED HISTORY: Reason for exam:->chest pain FINDINGS: Frontal and lateral views of the chest are submitted for review. The cardiac silhouette is normal in size. Lung parenchyma is clear without focal airspace consolidation, sizeable pleural effusion, or pneumothorax. Trachea is midline. Osseous structures and soft tissues are grossly intact. A loop recorder is present overlying the left anterior chest wall.      No evidence for acute cardiopulmonary pathology. Ct Head Wo Contrast    Result Date: 10/13/2017  EXAMINATION: CT OF THE HEAD WITHOUT CONTRAST  10/13/2017 3:40 pm TECHNIQUE: CT of the head was performed without the administration of intravenous contrast. Dose modulation, iterative reconstruction, and/or weight based adjustment of the mA/kV was utilized to reduce the radiation dose to as low as reasonably achievable. COMPARISON: July 12, 2017 HISTORY: ORDERING SYSTEM PROVIDED HISTORY: headache, lightheaded FINDINGS: BRAIN/VENTRICLES: There is no acute intracranial hemorrhage, mass effect or midline shift. No abnormal extra-axial fluid collection. The gray-white differentiation is maintained without evidence of an acute infarct. There is mild parenchymal volume loss. Stable nonspecific areas of hypoattenuation within the periventricular and subcortical white matter, which likely represent chronic microvascular ischemic change. ORBITS: The visualized portion of the orbits demonstrate no acute abnormality. SINUSES: The visualized paranasal sinuses and mastoid air cells demonstrate no acute abnormality. SOFT TISSUES/SKULL: No acute abnormality of the visualized skull or soft tissues. Stable noncontrast examination of the brain without CT evidence of acute intracranial abnormality and chronic findings as described. EKG  None    All EKG's are interpreted by the Emergency Department Physician who either signs or Co-signs this chart in the absence of a cardiologist.    EMERGENCY DEPARTMENT COURSE:  Patient seen and evaluated by myself and attending. Patient is in no acute distress. Venous Doppler of the left upper extremity is negative we'll send the patient home with a short course of Keflex and have her follow-up with her regular doctor. Discussed results and plan with patient and patient is agreeable with plan. Patient is requesting discharge. Patient was given written and verbal instructions prior to discharge.  Did ask

## 2017-10-16 ENCOUNTER — TELEPHONE (OUTPATIENT)
Dept: FAMILY MEDICINE CLINIC | Age: 67
End: 2017-10-16

## 2017-10-17 ENCOUNTER — HOSPITAL ENCOUNTER (OUTPATIENT)
Dept: PAIN MANAGEMENT | Age: 67
Discharge: HOME | End: 2017-10-17
Payer: MEDICARE

## 2017-10-17 VITALS
OXYGEN SATURATION: 94 % | SYSTOLIC BLOOD PRESSURE: 140 MMHG | TEMPERATURE: 97.6 F | DIASTOLIC BLOOD PRESSURE: 71 MMHG | WEIGHT: 248 LBS | HEART RATE: 65 BPM | BODY MASS INDEX: 45.64 KG/M2 | HEIGHT: 62 IN | RESPIRATION RATE: 16 BRPM

## 2017-10-17 DIAGNOSIS — M47.892 OTHER OSTEOARTHRITIS OF SPINE, CERVICAL REGION: ICD-10-CM

## 2017-10-17 DIAGNOSIS — G43.709 CHRONIC MIGRAINE WITHOUT AURA WITHOUT STATUS MIGRAINOSUS, NOT INTRACTABLE: ICD-10-CM

## 2017-10-17 DIAGNOSIS — E66.01 OBESITY, MORBID, BMI 40.0-49.9 (HCC): ICD-10-CM

## 2017-10-17 DIAGNOSIS — M25.562 CHRONIC PAIN OF LEFT KNEE: ICD-10-CM

## 2017-10-17 DIAGNOSIS — G43.009 NONINTRACTABLE MIGRAINE, UNSPECIFIED MIGRAINE TYPE: ICD-10-CM

## 2017-10-17 DIAGNOSIS — M54.16 LUMBAR RADICULOPATHY, CHRONIC: ICD-10-CM

## 2017-10-17 DIAGNOSIS — M54.81 BILATERAL OCCIPITAL NEURALGIA: ICD-10-CM

## 2017-10-17 DIAGNOSIS — M46.1 SACROILIITIS, NOT ELSEWHERE CLASSIFIED (HCC): ICD-10-CM

## 2017-10-17 DIAGNOSIS — M47.817 LUMBOSACRAL SPONDYLOSIS WITHOUT MYELOPATHY: ICD-10-CM

## 2017-10-17 DIAGNOSIS — M51.37 DEGENERATION OF LUMBAR OR LUMBOSACRAL INTERVERTEBRAL DISC: ICD-10-CM

## 2017-10-17 DIAGNOSIS — M50.30 DEGENERATIVE CERVICAL DISC: ICD-10-CM

## 2017-10-17 DIAGNOSIS — G89.29 CHRONIC PAIN OF LEFT KNEE: ICD-10-CM

## 2017-10-17 DIAGNOSIS — G89.29 ENCOUNTER FOR CHRONIC PAIN MANAGEMENT: Primary | ICD-10-CM

## 2017-10-17 DIAGNOSIS — Z51.81 ENCOUNTER FOR MEDICATION MONITORING: ICD-10-CM

## 2017-10-17 DIAGNOSIS — M47.812 OSTEOARTHRITIS OF CERVICAL SPINE, UNSPECIFIED SPINAL OSTEOARTHRITIS COMPLICATION STATUS: ICD-10-CM

## 2017-10-17 PROCEDURE — 99213 OFFICE O/P EST LOW 20 MIN: CPT

## 2017-10-17 PROCEDURE — 99213 OFFICE O/P EST LOW 20 MIN: CPT | Performed by: NURSE PRACTITIONER

## 2017-10-17 RX ORDER — OXYCODONE HYDROCHLORIDE AND ACETAMINOPHEN 5; 325 MG/1; MG/1
1 TABLET ORAL EVERY 6 HOURS PRN
Qty: 100 TABLET | Refills: 0 | Status: SHIPPED | OUTPATIENT
Start: 2017-10-26 | End: 2017-11-29 | Stop reason: SDUPTHER

## 2017-10-17 ASSESSMENT — ENCOUNTER SYMPTOMS
RESPIRATORY NEGATIVE: 1
BACK PAIN: 1
NAUSEA: 1
BLURRED VISION: 1
VISUAL CHANGE: 1

## 2017-10-17 NOTE — PROGRESS NOTES
obesity. Possible side effects, risk of tolerance and or dependence and alternative treatments discussed    Obtaining appropriate analgesic effect of treatment   No signs of potential drug abuse or diversion identified    Treatment goals:  Functional status: get rid of left hand pain      Aberrancy none  Analgesia  Pain 6-8  Adverse  Effects none, BM daily:  ADL;s : home exercises, has an aide,     Patient denies any new neurological symptoms. No bowel or bladder incontinence, no weakness, and no falling. Pill count: appropriate      Review of OARRS does not show any aberrant prescription behavior. Medication is helping the patient stay active. Patient denies any side effects and reports adequate analgesia. No sign of misuse/abuse. Past Medical History:   Diagnosis Date    Allergic rhinitis     Anxiety 7/17/2013    Arthritis     Asthma     Atrial fibrillation (HCC)     Back pain     NERVE/DR. GRACIA    CAD (coronary artery disease) 3/21/2013    Caffeine use     2 coffee/day    CHF [facility]congestive heart failure) ([facility]     COPD (chronic obstructive pulmonary disease) ([facility])     emphysema    Degeneration of lumbar or lumbosacral intervertebral disc     Depression     DM (diabetes mellitus) ([facility]     Emphysema of lung ([facility])     Gastritis     GERD (gastroesophageal reflux disease)     Hearing loss     Hematuria     Hiatal hernia     HTN (hypertension), benign 6/28/2014    Hypertriglyceridemia     Incontinence of urine 9/25/2013    Knee arthropathy     Migraine headache     DR. GRACIA    Mumps     On home oxygen therapy     2 L PER [place]  HS AND PRN    HIGINIO on CPAP     Otitis media 1-26-15    Stroke ([facility])     QUESTIONABLE    Tinnitus     Type II or unspecified type diabetes mellitus without mention of complication, not stated as uncontrolled     UTI (lower urinary tract infection)     Varicose vein        Past Surgical History:   Procedure Laterality Date    ABLATION OF DYSRHYTHMIC FOCUS [address] Right     CATARACT REMOVAL WITH IMPLANT Bilateral     CHOLECYSTECTOMY  2007    COLONOSCOPY      COLONOSCOPY  04/10/2017    tubular adenomo polyp , mod. sigmoid diverticulosis, min. int. hemorrhoids    CYSTOSCOPY  9/25/2013    DILATION AND CURETTAGE  1979    EYE SURGERY      laser    INCONTINENCE SURGERY      Percutaneous nerve stimulation Dr Lokesh Amaya 2013     INSERTABLE CARDIAC MONITOR  12/2015    OTHER SURGICAL HISTORY  09/10/15    removal of interstim    WA COLSC FLX W/REMOVAL LESION BY HOT BX FORCEPS N/A 4/10/2017    COLONOSCOPY POLYPECTOMY HOT BIOPSY performed by Vicky Guillen MD at [address] [place] [address]      TYMPANOSTOMY TUBE PLACEMENT  08/21/2017    UPPER GASTROINTESTINAL ENDOSCOPY         Allergies   Allergen Reactions    Robitussin [Guaifenesin] Anaphylaxis    Codeine Swelling    Compazine [Prochlorperazine Maleate] Hives and Swelling    Iodides Itching    Morphine Other (See Comments)     hallucinations    Reglan [Metoclopramide] Nausea Only    Avelox [Moxifloxacin Hcl In Nacl] Rash     Dermatitis      Cipro Xr Rash     dermatitis    Sulfa Antibiotics Rash         Current Outpatient Prescriptions:     [START ON 10/26/2017] oxyCODONE-acetaminophen (PERCOCET) 5-325 MG per tablet, Take 1 tablet by mouth every 6 hours as needed for Pain (take one tablet three to four times a day as needed for lower back pain.) .  Earliest Fill Date: 10/26/17, Disp: 100 tablet, Rfl: 0    cephALEXin (KEFLEX) 500 MG capsule, Take 1 capsule by mouth 4 times daily for 7 days, Disp: 28 capsule, Rfl: 0    gabapentin (NEURONTIN) 100 MG capsule, Take 1 capsule by mouth 3 times daily, Disp: 90 capsule, Rfl: 3    losartan (COZAAR) 25 MG tablet, TAKE 1 TAB BY MOUTH ONCE A DAY, Disp: 90 tablet, Rfl: 0    ranitidine (ZANTAC) 150 MG tablet, , Disp: , Rfl:     insulin glargine (LANTUS SOLOSTAR) 100 UNIT/ML injection pen, Rfl: 3    Elastic Bandages & Supports (ANKLE BRACE/HIGH PERFORMANCE L) [facility], Needs right ankle brace. Diagnosis: right ankle injury, Disp: 1 each, Rfl: 0    ULTICARE SHORT PEN NEEDLES 31G X 8 MM MISC, AS DIRECTED, Disp: 100 each, Rfl: 5    docusate sodium (COLACE) 100 MG capsule, Take 1 capsule by mouth 2 times daily Please use while taking Percocet, Disp: 30 capsule, Rfl: 0    SYMBICORT 160-4.5 MCG/ACT AERO,  2 puffs As needed for sob, Disp: , Rfl:     ipratropium (ATROVENT) 0.02 % nebulizer solution, , Disp: , Rfl:     insulin regular (HUMULIN R;NOVOLIN R) 100 UNIT/ML injection,  Inject 10 Units into the skin See Admin Instructions Indications: 10 units daily @ 6 pm Per sliding scale, Disp: , Rfl:     albuterol (PROVENTIL) (2.5 MG/3ML) 0.083% nebulizer solution, Take 2.5 mg by nebulization every 6 hours as needed. , Disp: , Rfl:     Family History   Problem Relation Age of Onset    Diabetes Mother     Heart Disease Mother     Stomach Cancer Father     Diabetes Maternal Grandmother      also aunts and uncles (maternal)       Social History     Social History    Marital status: Single     Spouse name: N/A    Number of children: N/A    Years of education: N/A     Occupational History    disabled N/A     Social History Main Topics    Smoking status: Former Smoker     Packs/day: 3.00     Years: 41.00     Types: Cigarettes     Quit date: 1/1/2000    Smokeless tobacco: Never Used      Comment: quit in 2000    Alcohol use No    Drug use: No    Sexual activity: Not Currently     Other Topics Concern    Not on file     Social History Narrative    No narrative on file       Review of Systems:  Review of Systems   Constitution: Positive for weakness. HENT: Positive for hearing loss. Eyes: Positive for blurred vision. Cardiovascular: Negative. Respiratory: Negative. Oxygen    Skin: Negative. Musculoskeletal: Positive for back pain. Gastrointestinal: Positive for nausea. medication information submitted by client. 6-Acetylmorphine, Ur Not Detected   Final 09/26/2017 11:30  [address] [facility]   7-Aminoclonazepam, Urine Not Detected   Final 09/26/2017 11:30 AM [facility], Urine Not Detected   Final 09/26/2017 11:30  [address] [facility]   Alprazolam, Urine Not Detected   Final 09/26/2017 11:30 AM [facility] [address] [facility]   Amphetamines, urine Not Detected   Final 09/26/2017 11:30  [address] [facility]   Barbiturates, Ur Not Detected   Final 09/26/2017 11:30 AM [facility] [address] [facility]   [place], Ur Not Detected   Final 09/26/2017 11:30 AM [facility] [address] [facility]   Buprenorphine Urine Not Detected   Final 09/26/2017 11:30  [address] [facility]   Carisoprodol, Ur Not Detected   Final 09/26/2017 11:30 AM [facility] [address] [facility]   (NOTE)   The carisoprodol immunoassay has cross-reactivity to carisoprodol   and meprobamate.     Clonazepam, Urine Not Detected   Final 09/26/2017 11:30 AM [facility] [address] [facility]   Codeine, Urine Not Detected   Final 09/26/2017 11:30 AM [facility] [address] [facility]   MDA, Ur Not Detected   Final 09/26/2017 11:30 AM [facility] [address] [facility]   Diazepam, Urine Not Detected   Final 09/26/2017 11:30 AM [facility] [address] [facility]   Ethyl Glucuronide Ur Not Detected   Final 09/26/2017 11:30  [address] [facility]   Fentanyl, Ur Not Detected   Final 09/26/2017 11:30 AM [facility] [address] [facility]   Hydrocodone, Urine Not Detected   Final 09/26/2017 11:30  [address] [facility]   Hydromorphone, Urine Not Detected   Final 09/26/2017 11:30  [address] [facility]   Lorazepam, Urine Not Detected   Final 09/26/2017 11:30  [address] [facility]   Marijuana Metab, Ur Not Detected   Final 09/26/2017 11:30  [address] [facility]   MDEA, ALMA, Ur Not Detected   Final 09/26/2017 11:30 AM [facility] [address] [facility] as the sole means for   clinical diagnosis or patient management decisions. EER Hi Res Interp Ur See Note   Final 09/26/2017 11:30 AM [facility]   (NOTE)   Access Gaming Live TV Enhanced Report using either link below:   -Direct access: [email or web address]   -Enter Username, Password: [email or web address]   Username: [email or web address]   Password: [email or web address]   Performed by [place] [address], [address] [facility] [phone]   www. Yuliet Agudelo MD, Lab. Director   Performed at [facility]: ANA DUTTA [address] [place] [address], [address] [facility] [phone]    Testing Performed By     PrafulRobin Duncan Name Director Address Valid Date Range   [address] LAB Tigre Pagan MD [address] [facility] [address] 08/30/17 1158-Present   Lab and Collection       EXAMINATION:   CT OF THE HEAD WITHOUT CONTRAST  10/13/2017 3:40 pm       TECHNIQUE:   CT of the head was performed without the administration of intravenous   contrast. Dose modulation, iterative reconstruction, and/or weight based   adjustment of the mA/kV was utilized to reduce the radiation dose to as low   as reasonably achievable.       COMPARISON:   July 12, 2017       HISTORY:   ORDERING SYSTEM PROVIDED HISTORY: headache, lightheaded           FINDINGS:   BRAIN/VENTRICLES: There is no acute intracranial hemorrhage, mass effect or   midline shift. No abnormal extra-axial fluid collection.  The gray-white   differentiation is maintained without evidence of an acute infarct.  There is   mild parenchymal volume loss.  Stable nonspecific areas of hypoattenuation   within the periventricular and subcortical white matter, which likely   represent chronic microvascular ischemic change.       ORBITS: The visualized portion of the orbits demonstrate no acute abnormality.       SINUSES: The visualized paranasal sinuses and mastoid air cells demonstrate   no acute +---------------------+----------+-----------------------------------------+         Left     Left internal jugular, subclavian, axillary, brachial, ulnar, radial,     cephalic and basilic veins are compressible with normal doppler     responses.       Allergies     - Allergy:Iodine(Drug).       - Allergy:Sulfa(Drug).       - Allergy:Codeine(Drug).       - Allergy:Other(Drug).       Comments:Drug Allergies - CIPRO, AVELOX       Patient Status:ER. Technical Quality:Adequate visualization.       Velocities are measured in cm/s ; Diameters are measured in mm       Left UE Vein Measurements   2D Measurements   +------------------------------------+----------+---------------+----------+   ! Location                            ! Visualized! Compressibility! Thrombosis! +------------------------------------+----------+---------------+----------+   ! Prox IJV                            ! Yes       ! Yes            !None      !   +------------------------------------+----------+---------------+----------+   ! Dist IJV                            ! Yes       ! Yes            !None      !   +------------------------------------+----------+---------------+----------+   ! Prox SCV                            ! Yes       ! Yes            !None      !   +------------------------------------+----------+---------------+----------+   ! Dist SCV                            ! Yes       ! Yes            !None      !   +------------------------------------+----------+---------------+----------+   ! Prox Axillary                       ! Yes       ! Yes            !None      !   +------------------------------------+----------+---------------+----------+   ! Dist Axillary                       ! Yes       ! Yes            !None      !   +------------------------------------+----------+---------------+----------+   ! Prox Brachial                       ! Yes       ! Yes            !None      ! +------------------------------------+----------+---------------+----------+   ! Dist Brachial                       ! Yes       ! Yes            !None      !   +------------------------------------+----------+---------------+----------+   ! Prox Radial                         ! Yes       ! Yes            !None      !   +------------------------------------+----------+---------------+----------+   ! Dist Radial                         ! Yes       ! Yes            !None      !   +------------------------------------+----------+---------------+----------+   ! Prox Ulnar                          ! Yes       ! Yes            !None      !   +------------------------------------+----------+---------------+----------+   ! Dist Ulnar                          ! Yes       ! Yes            !None      !   +------------------------------------+----------+---------------+----------+   ! Basilic at UA                       ! Yes       ! Yes            !None      !   +------------------------------------+----------+---------------+----------+   ! Basilic at AF                       ! Yes       ! Yes            !None      !   +------------------------------------+----------+---------------+----------+   ! Basilic at [facility]                       ! Yes       ! Yes            !None      !   +------------------------------------+----------+---------------+----------+   ! Cephalic at UA                      ! Yes       ! Yes            !None      !   +------------------------------------+----------+---------------+----------+   ! Cephalic at AF                      ! Yes       ! Yes            !None      !   +------------------------------------+----------+---------------+----------+   ! Cephalic at LA                      ! Yes       ! Yes            !None      !   +------------------------------------+----------+---------------+----------+       Doppler Measurements   +-------------------------+-----------------------+------------------------+   ! Location                 !Signal                 ! Reflux                  !   +-------------------------+-----------------------+------------------------+   ! IJV                      !Phasic                 !                        !   +-------------------------+-----------------------+------------------------+   ! SCV                      !Phasic                 !                        !   +-------------------------+-----------------------+------------------------+   ! Axillary                 !Phasic                 !                        !   +-------------------------+-----------------------+------------------------+   ! Brachial                 !Phasic                 !                        !   +-------------------------+-----------------------+------------------------+        Conclusions        Summary        Simultaneous real time imaging utilizing B-Mode, color doppler and    spectral waveform analysis was performed on the left upper extremity.    Findings demonstrated:        Normal venous duplex with no thrombosis of the deep or superficial    systems.        Signature        ----------------------------------------------------------------    Electronically signed by Salvador Giraldo(Sonographer) on    10/15/2017 07:15 PM    ----------------------------------------------------------------        ----------------------------------------------------------------    Electronically signed by Reji Leavitt(Interpreting physician)    on 10/16/2017 12:01 AM    ----------------------------------------------------------------           Order History       Assessment:      Problem List Items Addressed This Visit     Degenerative cervical disc (Chronic)    Relevant Medications    oxyCODONE-acetaminophen (PERCOCET) 5-325 MG per tablet (Start on 10/26/2017)    Cervical spondylosis (Chronic)    Relevant Medications    oxyCODONE-acetaminophen (PERCOCET) 5-325 MG per tablet (Start on 10/26/2017)    Encounter for medication monitoring (Chronic) Lumbosacral spondylosis without myelopathy (Chronic)    Relevant Medications    oxyCODONE-acetaminophen (PERCOCET) 5-325 MG per tablet (Start on 10/26/2017)    Sacroiliitis, not elsewhere classified ([facility] [address] (Chronic)    Relevant Medications    oxyCODONE-acetaminophen (PERCOCET) 5-325 MG per tablet (Start on 10/26/2017)    Obesity, morbid, BMI 40.0-49.9 ([facility]) (Chronic)    Chronic pain of left knee    Relevant Medications    oxyCODONE-acetaminophen (PERCOCET) 5-325 MG per tablet (Start on 10/26/2017)    Degeneration of lumbar or lumbosacral intervertebral disc    Relevant Medications    oxyCODONE-acetaminophen (PERCOCET) 5-325 MG per tablet (Start on 10/26/2017)    Bilateral occipital neuralgia    Relevant Medications    oxyCODONE-acetaminophen (PERCOCET) 5-325 MG per tablet (Start on 10/26/2017)    Other Relevant Orders    [place] INJECT NERV BLCK,GREAT OCCIPTL    Encounter for chronic pain management - Primary    Osteoarthritis of cervical spine    Relevant Medications    oxyCODONE-acetaminophen (PERCOCET) 5-325 MG per tablet (Start on 10/26/2017)    Chronic migraine without aura without status migrainosus, not intractable    Relevant Medications    oxyCODONE-acetaminophen (PERCOCET) 5-325 MG per tablet (Start on 10/26/2017)    Lumbar radiculopathy, chronic      Other Visit Diagnoses     Nonintractable migraine, unspecified migraine type        Relevant Medications    oxyCODONE-acetaminophen (PERCOCET) 5-325 MG per tablet (Start on 10/26/2017)    Chronic migraine        Relevant Medications    oxyCODONE-acetaminophen (PERCOCET) 5-325 MG per tablet (Start on 10/26/2017)        Treatment Plan:  DISCUSSION: Treatment options discussed with patient and all questions answered to patient's satisfaction. Will schedule for bilateral occipital nerve block, last done 8/2016 with 75% relief. She has tenderness over left and right occipital area.     TREATMENT OPTIONS:   Return ASAP  Bilateral occipital nerve block  Continue opioid

## 2017-10-19 ENCOUNTER — HOSPITAL ENCOUNTER (OUTPATIENT)
Age: 67
Discharge: HOME | End: 2017-10-19
Payer: MEDICARE

## 2017-10-19 ENCOUNTER — OFFICE VISIT (OUTPATIENT)
Dept: FAMILY MEDICINE CLINIC | Age: 67
End: 2017-10-19
Payer: MEDICARE

## 2017-10-19 VITALS
HEIGHT: 62 IN | DIASTOLIC BLOOD PRESSURE: 72 MMHG | OXYGEN SATURATION: 95 % | HEART RATE: 75 BPM | BODY MASS INDEX: 44.57 KG/M2 | SYSTOLIC BLOOD PRESSURE: 124 MMHG | WEIGHT: 242.2 LBS

## 2017-10-19 DIAGNOSIS — M79.89 ARM SWELLING: ICD-10-CM

## 2017-10-19 DIAGNOSIS — E11.42 TYPE 2 DIABETES MELLITUS WITH DIABETIC POLYNEUROPATHY, WITH LONG-TERM CURRENT USE OF INSULIN (HCC): Chronic | ICD-10-CM

## 2017-10-19 DIAGNOSIS — Z23 NEEDS FLU SHOT: ICD-10-CM

## 2017-10-19 DIAGNOSIS — G62.9 NEUROPATHY: ICD-10-CM

## 2017-10-19 DIAGNOSIS — M47.812 OSTEOARTHRITIS OF CERVICAL SPINE, UNSPECIFIED SPINAL OSTEOARTHRITIS COMPLICATION STATUS: Chronic | ICD-10-CM

## 2017-10-19 DIAGNOSIS — G43.709 CHRONIC MIGRAINE WITHOUT AURA WITHOUT STATUS MIGRAINOSUS, NOT INTRACTABLE: ICD-10-CM

## 2017-10-19 DIAGNOSIS — E78.5 HYPERLIPIDEMIA WITH TARGET LDL LESS THAN 100: Chronic | ICD-10-CM

## 2017-10-19 DIAGNOSIS — Z79.4 TYPE 2 DIABETES MELLITUS WITH DIABETIC POLYNEUROPATHY, WITH LONG-TERM CURRENT USE OF INSULIN (HCC): Chronic | ICD-10-CM

## 2017-10-19 DIAGNOSIS — R29.898 LEFT ARM WEAKNESS: ICD-10-CM

## 2017-10-19 DIAGNOSIS — R07.89 OTHER CHEST PAIN: Primary | ICD-10-CM

## 2017-10-19 LAB
ALBUMIN SERPL-MCNC: 4.3 G/DL (ref 3.5–5.2)
ALBUMIN/GLOBULIN RATIO: NORMAL (ref 1–2.5)
ALP BLD-CCNC: 54 U/L (ref 35–104)
ALT SERPL-CCNC: 15 U/L (ref 5–33)
AST SERPL-CCNC: 14 U/L
BILIRUB SERPL-MCNC: 0.34 MG/DL (ref 0.3–1.2)
BILIRUBIN DIRECT: 0.09 MG/DL
BILIRUBIN, INDIRECT: 0.25 MG/DL (ref 0–1)
FOLATE: 8.3 NG/ML
GLOBULIN: NORMAL G/DL (ref 1.5–3.8)
TOTAL PROTEIN: 7.5 G/DL (ref 6.4–8.3)
VITAMIN B-12: 389 PG/ML (ref 211–946)
VITAMIN D 25-HYDROXY: 14.5 NG/ML (ref 30–100)

## 2017-10-19 PROCEDURE — 82306 VITAMIN D 25 HYDROXY: CPT

## 2017-10-19 PROCEDURE — 36415 COLL VENOUS BLD VENIPUNCTURE: CPT

## 2017-10-19 PROCEDURE — G0008 ADMIN INFLUENZA VIRUS VAC: HCPCS | Performed by: FAMILY MEDICINE

## 2017-10-19 PROCEDURE — 82607 VITAMIN B-12: CPT

## 2017-10-19 PROCEDURE — 82746 ASSAY OF FOLIC ACID SERUM: CPT

## 2017-10-19 PROCEDURE — 80076 HEPATIC FUNCTION PANEL: CPT

## 2017-10-19 PROCEDURE — 90662 IIV NO PRSV INCREASED AG IM: CPT | Performed by: FAMILY MEDICINE

## 2017-10-19 PROCEDURE — 99214 OFFICE O/P EST MOD 30 MIN: CPT | Performed by: FAMILY MEDICINE

## 2017-10-19 RX ORDER — PHENOL 1.4 %
10 AEROSOL, SPRAY (ML) MUCOUS MEMBRANE NIGHTLY
Qty: 90 TABLET | Refills: 3 | Status: SHIPPED | OUTPATIENT
Start: 2017-10-19 | End: 2022-07-25

## 2017-10-19 NOTE — PROGRESS NOTES
Chief Complaint   Patient presents with    Hand Pain     Left. Had an IV placed and the medication that was given she was allergic to it. Patient had a doppler completed on it 10/15         Michaelgianfranco Kauffman  here today for follow up on chronic medical problems, go over labs and/or diagnostic studies, and medication refills. Hand Pain (Left. Had an IV placed and the medication that was given she was allergic to it. Patient had a doppler completed on it 10/15)      HPI: Patient is here for hospital follow-up was admitted in [facility]. [facility] obs  unit with history of chest pain. Patient reports the pain was in her neck and then radiated to the chest, it was 8 out of 10 not getting better with anything. She has history of migraine headaches and reports it was due to that. She was treated with pain medications and her pain has resolved. She was also seen by cardiologist, no stress test was done, and appointment was made with cardiologist as an outpatient. Patient reports she has appointment in next 2 weeks. Her chest but has been resolved. For migraine headaches she was treated with Compazine, and then she had allergic reaction to that in the form of swelling of left hand and redness. She again went to the ER was treated with Benadryl and antibiotics and also  Doppler was done to rule out any clots. Patient reports she went to see pain management and they tried steroid injection which did help her. Now she is complaining of swelling of left arm near her wrist and also pain which has improved mild. She has chronic cervical spondylosis and also weakness and numbness of left arm was started on Neurontin on previous appointment reports she could not tolerate that and she was feeling some side effects. She stopped taking that. /72   Pulse 75   Ht 5' 2\" (1.575 m)   Wt 242 lb 3.2 oz (109.9 kg)   LMP  (LMP Unknown)   SpO2 95% Comment: 2.5L O2  Breastfeeding?  No   BMI 44.30 kg/m²    Body mass index is 44.3 kg/m². Wt Readings from Last 3 Encounters:   10/19/17 242 lb 3.2 oz (109.9 kg)   10/17/17 248 lb (112.5 kg)   10/13/17 248 lb (112.5 kg)        []Negative depression screening. PHQ Scores 7/15/2015   PHQ2 Score 0   PHQ9 Score 0      []1-4 = Minimal depression   []5-9 = Mild depression   []10-14 = Moderate depression   []15-19 = Moderately severe depression   []20-27 = Severe depression    Discussed testing with the patient and all questions fully answered. Admission on 10/13/2017, Discharged on 10/14/2017   Component Date Value Ref Range Status    POC Troponin I 10/13/2017 0.00  0.00 - 0.10 ng/mL Final    POC Troponin Interp 10/13/2017 The Troponin-I (POC) results cannot be compared to the Troponin-T results. Final    Comment:       According to the Food and Drug Administration, due to the lack of a standard   troponin assay, there is variability across platforms and results obtained from   different assay platforms cannot be compared to track or evaluate the same   patient.  Pro-BNP 10/13/2017 236  <300 pg/mL Final    BNP Interpretation 10/13/2017        Final    Comment: Pro-BNP Reference Range:        Rule Out:    <300        [place]:   Age <50     300-450   Age 54-65   300-900   Age >75     300-1800  Usually represents mild to moderate HF but other cardiopulmonary causes cannot   be ruled out.         Rule In:   Age <50     >65   Age 54-65   >900   Age >76    [address] [phone]      Glucose 10/13/2017 133* 70 - 99 mg/dL Final    BUN 10/13/2017 10  8 - 23 mg/dL Final    CREATININE 10/13/2017 0.87  0.50 - 0.90 mg/dL Final    Bun/Cre Ratio 10/13/2017 NOT REPORTED  9 - 20 Final    Calcium 10/13/2017 8.7  8.6 - 10.4 mg/dL Final    Sodium 10/13/2017 137  135 - 144 mmol/L Final    Potassium 10/13/2017 4.4  3.7 - 5.3 mmol/L Final    Chloride 10/13/2017 99  98 - 107 mmol/L Final    CO2 10/13/2017 24  20 - 31 mmol/L Final TAKE 1 TAB BY MOUTH ONCE A DAY 90 tablet 0    ranitidine (ZANTAC) 150 MG tablet       insulin glargine (LANTUS SOLOSTAR) 100 UNIT/ML injection pen Inject 35 Units into the skin nightly 5 Pen 3    insulin glargine (LANTUS SOLOSTAR) 100 UNIT/ML injection pen Inject 45 Units into the skin every morning 5 Pen 3    amLODIPine (NORVASC) 5 MG tablet TAKE 1 TAB BY MOUTH ONCE A DAY 90 tablet 0    topiramate (TOPAMAX) 100 MG tablet Take 1 tablet by mouth nightly 90 tablet 1    carvedilol (COREG) 3.125 MG tablet TAKE 1 TAB BY MOUTH TWICE A DAY 60 tablet 3    atorvastatin (LIPITOR) 80 MG tablet TAKE 1 TAB BY MOUTH ONCE A DAY 90 tablet 1    diphenhydrAMINE (BENADRYL) 25 MG capsule TAKE 1 TABLET BY MOUTH NIGHTLY AS NEEDED (INSOMNIA) 30 capsule 0    pantoprazole (PROTONIX) 40 MG tablet Take 1 tablet by mouth 2 times daily 180 tablet 3    Insulin Pen Needle 31G X 6 MM MISC 1 each by Does not apply route daily 100 each 3    isosorbide mononitrate (IMDUR) 30 MG extended release tablet TAKE 1 TABLET BY MOUTH DAILY 30 tablet 5    Elastic Bandages & Supports (ANKLE BRACE/HIGH PERFORMANCE L) MISC Needs right ankle brace. Diagnosis: right ankle injury 1 each 0    ferrous sulfate 325 (65 FE) MG tablet Take 325 mg by mouth daily (with breakfast)      metFORMIN (GLUCOPHAGE) 1000 MG tablet Take 1 tablet by mouth 2 times daily (with meals) 180 tablet 3    ULTICARE SHORT PEN NEEDLES 31G X 8 MM MISC AS DIRECTED 100 each 5    docusate sodium (COLACE) 100 MG capsule Take 1 capsule by mouth 2 times daily Please use while taking Percocet 30 capsule 0    SYMBICORT 160-4.5 MCG/ACT AERO   2 puffs As needed for sob      ipratropium (ATROVENT) 0.02 % nebulizer solution       magnesium oxide (MAG-OX) 400 MG tablet Take 400 mg by mouth 2 times daily.       insulin regular (HUMULIN R;NOVOLIN R) 100 UNIT/ML injection   Inject 10 Units into the skin See Admin Instructions Indications: 10 units daily @ 6 pm Per sliding scale      OXYGEN irregular heartbeat or orthopnea  Gastrointestinal ROS: no abdominal pain, change in bowel habits, or black or bloody stools  Genito-Urinary ROS: no dysuria, trouble voiding, or hematuria  Musculoskeletal ROS: positive for - joint pain, joint stiffness, joint swelling, muscle pain and pain in left arm, And swelling  negative for - gait disturbance  Neurological ROS: positive for - headaches and numbness/tingling  negative for - confusion or gait disturbance  Dermatological ROS: positive for - rash and swelling   negative for - dry skin or eczema        Physical Exam    PHYSICAL EXAM:   VITALS:   Vitals:    10/19/17 0839   BP: 124/72   Pulse: 75   SpO2: 95%     GENERAL:  Patient is a well-developed, well-nourished female, on wheelchair with oxygen tank  in no acute distress, alert and oriented x3, appropriate and pleasant conversation. HEAD: Normocephalic, atraumatic. EYES: Pupils equal, round and reactive to light and accommodation, extraocular   movements intact. ENT: Moist mucous membranes. No erythema is noted. NECK: Supple. No masses. No lymphadenopathy. CARDIOVASCULAR: Regular rate and rhythm. PULMONARY: Lungs are clear to auscultation bilaterally. ABDOMEN: Soft, nontender, nondistended. Positive bowel sounds. MUSCULOSKELETAL: Swelling of left wrist, mild tenderness present. Weakness of left arm about 4 out of 5, sensations normal  NEUROLOGIC: Cranial nerves II through XII grossly intact. No focal deficits are noted. ASSESSMENT AND PLAN      1. Other chest pain  -Chest pain has resolved, advised to keep appointment with cardiologist.  Continue same medications  - Melatonin 10 MG TABS; Take 10 mg by mouth nightly  Dispense: 90 tablet; Refill: 3    2. Arm swelling  -Encouraged patient the swelling will improve within the time. Discontinue Benadryl and continue antibiotics    4.  Left arm weakness  -She has chronic cervical spondylosis, and follows with pain management, we will do an EMG for for falls in-office gait and balance testing performed using The Timed Up and Go Test was negative for increased falls risk- no further intervention is currently indicated, No Treatment plan indicated    Lab Results   Component Value Date    LDLCHOLESTEROL 81 06/01/2017    (goal LDL reduction with dx if diabetes is 50% LDL reduction)    PHQ Scores 7/15/2015   PHQ2 Score 0   PHQ9 Score 0     Interpretation of Total Score Depression Severity: 1-4 = Minimal depression, 5-9 = Mild depression, 10-14 = Moderate depression, 15-19 = Moderately severe depression, 20-27 = Severe depression        The patient's past medical, surgical, social, and family history as well as her   current medications and allergies were reviewed as documented in today's encounter. Medications, labs, diagnostic studies, consultations and follow-up as documented in this encounter. Return in about 2 months (around 12/19/2017) for for emg, left weakness, DM. Jeanna Mendoza Patient was seen with total face to face time of 25 minutes. More than 50% of this visit was counseling and education. Future Appointments  Date Time Provider Clarence Mas   11/10/2017 9:40 AM MD VERA Flood PAINMGT None   11/21/2017 1:00 PM MD VERA Flood PAINMGT None   12/21/2017 10:00 AM [facility]     This note was completed by using the assistance of a speech-recognition program. However, inadvertent computerized transcription errors may be present. Although every effort was made to ensure accuracy, no guarantees can be provided that every mistake has been identified and corrected by editing.   Electronically signed by Julio C To MD on 10/19/2017  9:20 AM

## 2017-10-19 NOTE — PROGRESS NOTES
Visit Information    Have you changed or started any medications since your last visit including any over-the-counter medicines, vitamins, or herbal medicines? no   Have you stopped taking any of your medications? Is so, why? -  no  Are you having any side effects from any of your medications? - no    Have you seen any other physician or provider since your last visit? yes - pain man   Have you had any other diagnostic tests since your last visit? yes -    Have you been seen in the emergency room and/or had an admission in a hospital since we last saw you?  yes - [facility] admit   Have you had your routine dental cleaning in the past 6 months?  no     Do you have an active MyChart account? If no, what is the barrier?   Yes    Patient Care Team:  Martha Sorto MD as PCP - General (Family Medicine)  Celine Robins DO as Consulting Physician (Obstetrics & Gynecology)  Tay Rubio MD as Consulting Physician (Urology)  Svetlana Castillo MD as Consulting Physician (Pulmonology)  Juliet Millan MD as Consulting Physician (Internal Medicine)  Dorothy Bronson (Optometry)  Aminah Hoffmann NP as Nurse Practitioner (Certified Nurse Practitioner)  Aminah Hoffmann NP as Nurse Practitioner (Certified Nurse Practitioner)    Medical History Review  Past Medical, Family, and Social History reviewed and does contribute to the patient presenting condition    Health Maintenance   Topic Date Due    DTaP/Tdap/Td vaccine (1 - Tdap) 12/24/1969    Diabetic retinal exam  07/13/2017    Flu vaccine (1) 09/01/2017    Diabetic foot exam  05/28/2018    Diabetic microalbuminuria test  06/01/2018    Lipid screen  06/01/2018    Diabetic hemoglobin A1C test  07/07/2018    Breast cancer screen  11/18/2018    Colon cancer screen colonoscopy  04/10/2020    Zostavax vaccine  Addressed    DEXA (modify frequency per FRAX score)  Completed    Pneumococcal low/med risk  Completed    Hepatitis C screen  Completed

## 2017-11-10 ENCOUNTER — HOSPITAL ENCOUNTER (OUTPATIENT)
Dept: PAIN MANAGEMENT | Age: 67
Discharge: HOME | End: 2017-11-10
Payer: COMMERCIAL

## 2017-11-10 DIAGNOSIS — G43.709 CHRONIC MIGRAINE WITHOUT AURA WITHOUT STATUS MIGRAINOSUS, NOT INTRACTABLE: ICD-10-CM

## 2017-11-10 DIAGNOSIS — M54.81 BILATERAL OCCIPITAL NEURALGIA: Primary | ICD-10-CM

## 2017-11-17 DIAGNOSIS — E78.5 HYPERLIPIDEMIA WITH TARGET LDL LESS THAN 100: Primary | ICD-10-CM

## 2017-11-17 RX ORDER — ATORVASTATIN CALCIUM 80 MG/1
TABLET, FILM COATED ORAL
Qty: 90 TABLET | Refills: 3 | Status: SHIPPED | OUTPATIENT
Start: 2017-11-17 | End: 2018-08-29 | Stop reason: SINTOL

## 2017-11-17 NOTE — TELEPHONE ENCOUNTER
Please Approve or Refuse. Next Visit Date:  Visit date not found    Hemoglobin A1C (%)   Date Value   07/07/2017 6.9 (H)   06/01/2017 6.4 (H)   12/06/2016 6.9 (H)             ( goal A1C is < 7)   Microalb/Crt.  Ratio (mcg/mg creat)   Date Value   06/01/2017 35 (H)     LDL Cholesterol (mg/dL)   Date Value   06/01/2017 81       (goal LDL is <100)   AST (U/L)   Date Value   10/19/2017 14     ALT (U/L)   Date Value   10/19/2017 15     BUN (mg/dL)   Date Value   10/13/2017 10     BP Readings from Last 3 Encounters:   10/19/17 124/72   10/17/17 (!) 140/71   10/15/17 132/66          (goal 120/80)        Patient Active Problem List:     Chronic pain of left knee     Type 2 diabetes mellitus with diabetic polyneuropathy, with long-term current use of insulin (HCC)     Stroke (HCC)     Back pain     Gastritis     COPD (chronic obstructive pulmonary disease)     Asthma     Depression     Arthritis     Chronic migraine     Pulmonary emphysema (HCC)     PCB (post coital bleeding)     CAD (coronary artery disease)     Anxiety     Incontinence of urine     Degeneration of lumbar or lumbosacral intervertebral disc     Bilateral leg pain     Chronic, continuous use of opioids     Degenerative cervical disc     Cervical spondylosis     Occipital neuralgia     Encounter for medication monitoring     COPD exacerbation ([facility] [address]     GERD (gastroesophageal reflux disease)     HTN (hypertension), benign     CAP (community acquired pneumonia)     Hyperlipidemia with target LDL less than 100     Insomnia     Lumbosacral spondylosis without myelopathy     Sacroiliitis, not elsewhere classified ([address]     Asthma exacerbation     Diarrhea     Bronchitis     TIA (transient ischemic attack)     Left arm weakness     Left arm pain     Carpal tunnel syndrome     Stroke-like symptoms     Chest pain     Mixed stress and urge urinary incontinence     Frequency of urination     Urge incontinence of urine     Bilateral low back pain with sciatica Bilateral occipital neuralgia     Intractable migraine with aura without status migrainosus     Encounter for chronic pain management     Spondylarthrosis     Pre-procedure lab exam     Chronic obstructive pulmonary disease (HCC)     Anxiety and depression     Sleep disturbance     Chronic migraine without aura without status migrainosus, not intractable     Pulmonary embolism (HCC)     Obesity, morbid, BMI 40.0-49.9 (HCC)     Lumbar radiculopathy, chronic     Colitis     Nausea & vomiting     Acute cystitis     Diverticulosis     Pulmonary nodule     Nodule of right lung     Left upper quadrant pain     Panic attack     Left sided numbness     Hypoglycemia associated with type 2 diabetes mellitus ([address]     Neuropathy ([facility] [address]

## 2017-11-28 ENCOUNTER — TELEPHONE (OUTPATIENT)
Dept: PAIN MANAGEMENT | Age: 67
End: 2017-11-28

## 2017-11-29 ENCOUNTER — HOSPITAL ENCOUNTER (OUTPATIENT)
Dept: PAIN MANAGEMENT | Age: 67
Discharge: HOME | End: 2017-11-29
Payer: MEDICARE

## 2017-11-29 VITALS
HEIGHT: 62 IN | HEART RATE: 68 BPM | DIASTOLIC BLOOD PRESSURE: 64 MMHG | TEMPERATURE: 98 F | SYSTOLIC BLOOD PRESSURE: 123 MMHG | BODY MASS INDEX: 45.64 KG/M2 | OXYGEN SATURATION: 96 % | WEIGHT: 248 LBS | RESPIRATION RATE: 18 BRPM

## 2017-11-29 DIAGNOSIS — E66.01 OBESITY, MORBID, BMI 40.0-49.9 (HCC): ICD-10-CM

## 2017-11-29 DIAGNOSIS — Z51.81 ENCOUNTER FOR MEDICATION MONITORING: ICD-10-CM

## 2017-11-29 DIAGNOSIS — M50.30 DEGENERATIVE CERVICAL DISC: ICD-10-CM

## 2017-11-29 DIAGNOSIS — G89.29 ENCOUNTER FOR CHRONIC PAIN MANAGEMENT: ICD-10-CM

## 2017-11-29 DIAGNOSIS — M54.81 BILATERAL OCCIPITAL NEURALGIA: ICD-10-CM

## 2017-11-29 DIAGNOSIS — G43.709 CHRONIC MIGRAINE WITHOUT AURA WITHOUT STATUS MIGRAINOSUS, NOT INTRACTABLE: ICD-10-CM

## 2017-11-29 DIAGNOSIS — M54.16 LUMBAR RADICULOPATHY, CHRONIC: ICD-10-CM

## 2017-11-29 DIAGNOSIS — G89.29 CHRONIC PAIN OF LEFT KNEE: ICD-10-CM

## 2017-11-29 DIAGNOSIS — M47.817 LUMBOSACRAL SPONDYLOSIS WITHOUT MYELOPATHY: ICD-10-CM

## 2017-11-29 DIAGNOSIS — G43.119 INTRACTABLE MIGRAINE WITH AURA WITHOUT STATUS MIGRAINOSUS: ICD-10-CM

## 2017-11-29 DIAGNOSIS — M47.812 OSTEOARTHRITIS OF CERVICAL SPINE, UNSPECIFIED SPINAL OSTEOARTHRITIS COMPLICATION STATUS: ICD-10-CM

## 2017-11-29 DIAGNOSIS — M25.562 CHRONIC PAIN OF LEFT KNEE: ICD-10-CM

## 2017-11-29 DIAGNOSIS — M51.37 DEGENERATION OF LUMBAR OR LUMBOSACRAL INTERVERTEBRAL DISC: Primary | ICD-10-CM

## 2017-11-29 PROCEDURE — 99213 OFFICE O/P EST LOW 20 MIN: CPT

## 2017-11-29 PROCEDURE — 99214 OFFICE O/P EST MOD 30 MIN: CPT | Performed by: PAIN MEDICINE

## 2017-11-29 RX ORDER — OXYCODONE HYDROCHLORIDE AND ACETAMINOPHEN 5; 325 MG/1; MG/1
1 TABLET ORAL EVERY 6 HOURS PRN
Qty: 100 TABLET | Refills: 0 | Status: SHIPPED | OUTPATIENT
Start: 2017-11-29 | End: 2017-12-18 | Stop reason: SDUPTHER

## 2017-11-29 RX ORDER — TOPIRAMATE 100 MG/1
100 TABLET, FILM COATED ORAL NIGHTLY
Qty: 90 TABLET | Refills: 1 | Status: SHIPPED | OUTPATIENT
Start: 2017-11-29 | End: 2018-12-21 | Stop reason: SDUPTHER

## 2017-11-29 ASSESSMENT — ENCOUNTER SYMPTOMS
CONSTIPATION: 0
SINUS PAIN: 0
SORE THROAT: 0
BACK PAIN: 1
COUGH: 0
VOMITING: 0
SHORTNESS OF BREATH: 1
PHOTOPHOBIA: 0
BLURRED VISION: 0
SCALP TENDERNESS: 1
NAUSEA: 0
EYES NEGATIVE: 1
HEARTBURN: 0

## 2017-11-29 ASSESSMENT — PAIN DESCRIPTION - PROGRESSION: CLINICAL_PROGRESSION: GRADUALLY WORSENING

## 2017-11-29 ASSESSMENT — PAIN DESCRIPTION - LOCATION: LOCATION: BACK;KNEE;HIP

## 2017-11-29 ASSESSMENT — PAIN DESCRIPTION - ONSET: ONSET: ON-GOING

## 2017-11-29 ASSESSMENT — PAIN DESCRIPTION - ORIENTATION: ORIENTATION: RIGHT;LEFT;LOWER

## 2017-11-29 ASSESSMENT — PAIN DESCRIPTION - PAIN TYPE: TYPE: CHRONIC PAIN

## 2017-11-29 ASSESSMENT — PAIN DESCRIPTION - FREQUENCY: FREQUENCY: CONTINUOUS

## 2017-11-29 ASSESSMENT — PAIN DESCRIPTION - DIRECTION: RADIATING_TOWARDS: LEFT LEG

## 2017-11-29 ASSESSMENT — PAIN SCALES - GENERAL: PAINLEVEL_OUTOF10: 7

## 2017-11-29 NOTE — PROGRESS NOTES
yes  Lost rx/pills: no  Taking more medication than prescribed:  no  Are you receiving PAIN medications from  other doctors: no  Last Urine/Serum Drug Screen :9/26/17  Was Serum/UDS as anticipated? yes  Brought pill bottles in :yes   Was Pill count appropriate? :yes   Are currently pregnant? not applicable  Recent ER visits: No             Past Medical History      Diagnosis Date    Allergic rhinitis     Anxiety 7/17/2013    Arthritis     Asthma     Atrial fibrillation (HCC)     Back pain     NERVE/DR. GRACIA    CAD (coronary artery disease) 3/21/2013    Caffeine use     2 coffee/day    CHF (congestive heart failure) (HCC)     COPD (chronic obstructive pulmonary disease) (HCC)     emphysema    Degeneration of lumbar or lumbosacral intervertebral disc     Depression     DM (diabetes mellitus) (HCC)     Emphysema of lung (HCC)     Gastritis     GERD (gastroesophageal reflux disease)     Hearing loss     Hematuria     Hiatal hernia     HTN (hypertension), benign 6/28/2014    Hypertriglyceridemia     Incontinence of urine 9/25/2013    Knee arthropathy     Migraine headache     DR. GRACIA    Mumps     On home oxygen therapy     2 L PER NC  HS AND PRN    HIGINIO on CPAP     Otitis media 1-26-15    Stroke ([facility])     QUESTIONABLE    Tinnitus     Type II or unspecified type diabetes mellitus without mention of complication, not stated as uncontrolled     UTI (lower urinary tract infection)     Varicose vein        Surgical History  Past Surgical History:   Procedure Laterality Date    ABLATION OF DYSRHYTHMIC FOCUS  2000    CARPAL TUNNEL RELEASE Right     CATARACT REMOVAL WITH IMPLANT Bilateral     CHOLECYSTECTOMY  2007    COLONOSCOPY      COLONOSCOPY  04/10/2017    tubular adenomo polyp , mod. sigmoid diverticulosis, min. int. hemorrhoids    CYSTOSCOPY  9/25/2013    DILATION AND CURETTAGE  1979    EYE SURGERY      laser    INCONTINENCE SURGERY      Percutaneous nerve stimulation  painful. Back rotation left: Limited and painful. Tests   Straight leg raise right: negative  Straight leg raise left: negative    Other   Sensation: normal  Gait: antalgic (Uses wheelchair)   Erythema: no back redness  Scars: absent    Comments:  Kyphosis absent and scoliosis absent  Alignment of her shoulders, scapulae and iliac crests--symmetric  Paraspinal tenderness:Present  Lumbar lordosis-----------Decreased  Movements of the lumbar spine indicated above are diminished range with pain   Facet loading---  : Right side--    Pain-Moderate                                   Left side----   Pain  Moderate  Yonathan's test -------------- Right side---positive                                           Left side-----positive  Examination of her cervical spine revealed tenderness over the facet joints bilaterally. There is also tenderness over the occipital nerves bilaterally. Facet loading is positive bilaterally. Nurses Notes and Vital Signs reviewed. DATA  Labs:  Benzodiazepines   Date Value Ref Range Status   06/28/2013 NEGATIVE NEG Final     Comment:           (Positive cutoff 200 ng/mL)                 Benzodiazepine Screen, Urine   Date Value Ref Range Status   06/05/2016 NEGATIVE NEG Final     Comment:           (Positive cutoff 200 ng/mL)                      Imaging:  Radiology Images and Reports reviewed where indicated and necessary       COMPARISON: 2/17/11       FINDINGS:    Postop clips RUQ. Nonspecific bowel gas pattern. Multilevel endplate osteophyte formation. Unchanged mild anterior wedging of the T12-L2 vertebral bodies. Chronic endplate osteophyte formation and L1 inferior endplate Schmorl's node. Aortic calcification. Facet joint sclerosis at L4-5 and L5-S1. No acute fracture or dislocation.           Impression   IMPRESSION:   Unchanged study without evidence of acute fracture or dislocation.  Chronic spondylosis including mild lower lumbar spine facet joint vomiting    Acute cystitis    Diverticulosis    Pulmonary nodule    Nodule of right lung    Left upper quadrant pain    Panic attack    Left sided numbness    Hypoglycemia associated with type 2 diabetes mellitus (HCC)    Neuropathy ([facility] [address]        ASSESSMENT    Haylee Stanley is a 78 y.o. female with     1. Degeneration of lumbar or lumbosacral intervertebral disc    2. Bilateral occipital neuralgia    3. Chronic migraine without aura without status migrainosus, not intractable    4. Encounter for chronic pain management    5. Lumbosacral spondylosis without myelopathy    6. Chronic pain of left knee    7. Degenerative cervical disc    8. Lumbar radiculopathy, chronic    9. Encounter for medication monitoring    10. Osteoarthritis of cervical spine, unspecified spinal osteoarthritis complication status    11. Obesity, morbid, BMI 40.0-49.9 ([facility] [address]    12. Intractable migraine with aura without status migrainosus         PLAN    We will continue current pain medications  Current medications are being tolerated without any Adverse side effects. Orders Placed This Encounter   Medications    oxyCODONE-acetaminophen (PERCOCET) 5-325 MG per tablet     Sig: Take 1 tablet by mouth every 6 hours as needed for Pain (take one tablet three to four times a day as needed for lower back pain.) . Earliest Fill Date: 11/29/17     Dispense:  100 tablet     Refill:  0    topiramate (TOPAMAX) 100 MG tablet     Sig: Take 1 tablet by mouth nightly     Dispense:  90 tablet     Refill:  1     90 day supply     Urine drug screens have been appropriate. No aberrant activity noted. Analgesia is achieved. Activities of daily living are possible because of medications. Safe use of medications explained to patient. Controlled Substances Monitoring:     Attestation: The Prescription Monitoring Report for this patient was reviewed today.  (Ginger Oliver MD)  Documentation: Obtaining appropriate analgesic effect of treatment., No signs of potential drug abuse or diversion identified. , Possible medication side effects, risk of tolerance and/or dependence, and alternative treatments discussed. , Existing medication contract. (Hector Dacosta MD)  Counselling/Preventive measures for pain  Control:    [x]  Spine strengthening exercises are discussed with patient in detail. [x] Ill effects of being on chronic pain medications such as sleep disturbances, hormonal changes, withdrawal symptoms,  chronic opioid dependence and tolerance were discussed with patient. I had asked the patient to minimize medication use and utilize pain medications only for uncontrolled rest pain or pain with exertional activities. I advised patient not to self escalate pain medications without consulting with us. At each of patient's future visits we will try to taper pain medications, while adjusting the adjunct medications, and re-evaluating for Physical Therapy to improve spinal and joint strength. We will continue to have discussions to decrease pain medications as tolerated. We discussed the same at today's visit and have not been to implement it, as the patient's pain is not under control with current medications. We will try to treat her conservatively for the time being. Patient is encouraged to exercise. If she still continues to have symptoms and then we will plan on interventions both in cervical as well as in the lumbar region as needed. Patient is encouraged to exercise and work on weight loss. Decision Making Process : Patient's health history and referral records thoroughly reviewed before focused physical examination and discussion with patient. Over 50% of today's visit is spent on examining the patient and counseling. Level of complexity of date to be reviewed is Moderate. The chart date reviewed include the following: Imaging Reports. Summary of Care.      Time spent reviewing with patient the below reports:

## 2017-12-18 RX ORDER — OXYCODONE HYDROCHLORIDE AND ACETAMINOPHEN 5; 325 MG/1; MG/1
1 TABLET ORAL EVERY 6 HOURS PRN
Qty: 100 TABLET | Refills: 0 | Status: ON HOLD | OUTPATIENT
Start: 2017-12-29 | End: 2018-01-22 | Stop reason: HOSPADM

## 2018-01-20 ENCOUNTER — APPOINTMENT (OUTPATIENT)
Dept: GENERAL RADIOLOGY | Age: 68
DRG: 206 | End: 2018-01-20
Payer: COMMERCIAL

## 2018-01-20 ENCOUNTER — HOSPITAL ENCOUNTER (OUTPATIENT)
Age: 68
Setting detail: OBSERVATION
Discharge: HOME HEALTH | DRG: 206 | End: 2018-01-23
Attending: EMERGENCY MEDICINE | Admitting: INTERNAL MEDICINE
Payer: COMMERCIAL

## 2018-01-20 DIAGNOSIS — E11.42 TYPE 2 DIABETES MELLITUS WITH DIABETIC POLYNEUROPATHY, WITH LONG-TERM CURRENT USE OF INSULIN (HCC): ICD-10-CM

## 2018-01-20 DIAGNOSIS — Z79.4 TYPE 2 DIABETES MELLITUS WITH DIABETIC POLYNEUROPATHY, WITH LONG-TERM CURRENT USE OF INSULIN (HCC): ICD-10-CM

## 2018-01-20 DIAGNOSIS — R07.9 CHEST PAIN, UNSPECIFIED TYPE: Primary | ICD-10-CM

## 2018-01-20 LAB
ABSOLUTE EOS #: 0.05 K/UL (ref 0–0.44)
ABSOLUTE IMMATURE GRANULOCYTE: <0.03 K/UL (ref 0–0.3)
ABSOLUTE LYMPH #: 1.65 K/UL (ref 1.1–3.7)
ABSOLUTE MONO #: 0.78 K/UL (ref 0.1–1.2)
ANION GAP SERPL CALCULATED.3IONS-SCNC: 13 MMOL/L (ref 9–17)
BASOPHILS # BLD: 1 % (ref 0–2)
BASOPHILS ABSOLUTE: 0.06 K/UL (ref 0–0.2)
BUN BLDV-MCNC: 14 MG/DL (ref 8–23)
BUN/CREAT BLD: ABNORMAL (ref 9–20)
CALCIUM SERPL-MCNC: 8.6 MG/DL (ref 8.6–10.4)
CHLORIDE BLD-SCNC: 97 MMOL/L (ref 98–107)
CO2: 24 MMOL/L (ref 20–31)
CREAT SERPL-MCNC: 1.04 MG/DL (ref 0.5–0.9)
DIFFERENTIAL TYPE: ABNORMAL
EOSINOPHILS RELATIVE PERCENT: 1 % (ref 1–4)
GFR AFRICAN AMERICAN: >60 ML/MIN
GFR NON-AFRICAN AMERICAN: 53 ML/MIN
GFR SERPL CREATININE-BSD FRML MDRD: ABNORMAL ML/MIN/{1.73_M2}
GFR SERPL CREATININE-BSD FRML MDRD: ABNORMAL ML/MIN/{1.73_M2}
GLUCOSE BLD-MCNC: 150 MG/DL (ref 65–105)
GLUCOSE BLD-MCNC: 154 MG/DL (ref 70–99)
GLUCOSE BLD-MCNC: 202 MG/DL (ref 65–105)
HCT VFR BLD CALC: 39.7 % (ref 36.3–47.1)
HEMOGLOBIN: 12.6 G/DL (ref 11.9–15.1)
IMMATURE GRANULOCYTES: 0 %
LYMPHOCYTES # BLD: 28 % (ref 24–43)
MCH RBC QN AUTO: 29.9 PG (ref 25.2–33.5)
MCHC RBC AUTO-ENTMCNC: 31.7 G/DL (ref 28.4–34.8)
MCV RBC AUTO: 94.1 FL (ref 82.6–102.9)
MONOCYTES # BLD: 13 % (ref 3–12)
NRBC AUTOMATED: 0 PER 100 WBC
PDW BLD-RTO: 13.1 % (ref 11.8–14.4)
PLATELET # BLD: 166 K/UL (ref 138–453)
PLATELET ESTIMATE: ABNORMAL
PMV BLD AUTO: 12 FL (ref 8.1–13.5)
POC TROPONIN I: 0.01 NG/ML (ref 0–0.1)
POC TROPONIN INTERP: NORMAL
POTASSIUM SERPL-SCNC: 3.9 MMOL/L (ref 3.7–5.3)
RBC # BLD: 4.22 M/UL (ref 3.95–5.11)
RBC # BLD: ABNORMAL 10*6/UL
SEG NEUTROPHILS: 57 % (ref 36–65)
SEGMENTED NEUTROPHILS ABSOLUTE COUNT: 3.31 K/UL (ref 1.5–8.1)
SODIUM BLD-SCNC: 134 MMOL/L (ref 135–144)
TROPONIN INTERP: NORMAL
TROPONIN INTERP: NORMAL
TROPONIN T: <0.03 NG/ML
TROPONIN T: <0.03 NG/ML
WBC # BLD: 5.9 K/UL (ref 3.5–11.3)
WBC # BLD: ABNORMAL 10*3/UL

## 2018-01-20 PROCEDURE — G0378 HOSPITAL OBSERVATION PER HR: HCPCS

## 2018-01-20 PROCEDURE — 6370000000 HC RX 637 (ALT 250 FOR IP): Performed by: STUDENT IN AN ORGANIZED HEALTH CARE EDUCATION/TRAINING PROGRAM

## 2018-01-20 PROCEDURE — 82947 ASSAY GLUCOSE BLOOD QUANT: CPT

## 2018-01-20 PROCEDURE — 96375 TX/PRO/DX INJ NEW DRUG ADDON: CPT

## 2018-01-20 PROCEDURE — 96374 THER/PROPH/DIAG INJ IV PUSH: CPT

## 2018-01-20 PROCEDURE — 93005 ELECTROCARDIOGRAM TRACING: CPT

## 2018-01-20 PROCEDURE — 71046 X-RAY EXAM CHEST 2 VIEWS: CPT

## 2018-01-20 PROCEDURE — 6370000000 HC RX 637 (ALT 250 FOR IP): Performed by: EMERGENCY MEDICINE

## 2018-01-20 PROCEDURE — 2580000003 HC RX 258: Performed by: EMERGENCY MEDICINE

## 2018-01-20 PROCEDURE — 85025 COMPLETE CBC W/AUTO DIFF WBC: CPT

## 2018-01-20 PROCEDURE — 99285 EMERGENCY DEPT VISIT HI MDM: CPT

## 2018-01-20 PROCEDURE — 80048 BASIC METABOLIC PNL TOTAL CA: CPT

## 2018-01-20 PROCEDURE — 84484 ASSAY OF TROPONIN QUANT: CPT

## 2018-01-20 PROCEDURE — 36415 COLL VENOUS BLD VENIPUNCTURE: CPT

## 2018-01-20 PROCEDURE — 6360000002 HC RX W HCPCS: Performed by: EMERGENCY MEDICINE

## 2018-01-20 RX ORDER — AMLODIPINE BESYLATE 5 MG/1
5 TABLET ORAL DAILY
Status: DISCONTINUED | OUTPATIENT
Start: 2018-01-20 | End: 2018-01-23 | Stop reason: HOSPADM

## 2018-01-20 RX ORDER — CLOPIDOGREL BISULFATE 75 MG/1
75 TABLET ORAL DAILY
Status: DISCONTINUED | OUTPATIENT
Start: 2018-01-20 | End: 2018-01-23 | Stop reason: HOSPADM

## 2018-01-20 RX ORDER — ONDANSETRON 2 MG/ML
4 INJECTION INTRAMUSCULAR; INTRAVENOUS ONCE
Status: COMPLETED | OUTPATIENT
Start: 2018-01-20 | End: 2018-01-20

## 2018-01-20 RX ORDER — SODIUM CHLORIDE 9 MG/ML
INJECTION, SOLUTION INTRAVENOUS CONTINUOUS
Status: DISCONTINUED | OUTPATIENT
Start: 2018-01-20 | End: 2018-01-23 | Stop reason: HOSPADM

## 2018-01-20 RX ORDER — ISOSORBIDE MONONITRATE 30 MG/1
30 TABLET, EXTENDED RELEASE ORAL DAILY
Status: DISCONTINUED | OUTPATIENT
Start: 2018-01-20 | End: 2018-01-23 | Stop reason: HOSPADM

## 2018-01-20 RX ORDER — ASPIRIN 81 MG/1
324 TABLET, CHEWABLE ORAL ONCE
Status: COMPLETED | OUTPATIENT
Start: 2018-01-20 | End: 2018-01-20

## 2018-01-20 RX ORDER — ATORVASTATIN CALCIUM 80 MG/1
80 TABLET, FILM COATED ORAL DAILY
Status: DISCONTINUED | OUTPATIENT
Start: 2018-01-20 | End: 2018-01-23 | Stop reason: HOSPADM

## 2018-01-20 RX ORDER — CITALOPRAM 20 MG/1
40 TABLET ORAL DAILY
Status: DISCONTINUED | OUTPATIENT
Start: 2018-01-20 | End: 2018-01-23 | Stop reason: HOSPADM

## 2018-01-20 RX ORDER — LOSARTAN POTASSIUM 25 MG/1
25 TABLET ORAL DAILY
Status: DISCONTINUED | OUTPATIENT
Start: 2018-01-20 | End: 2018-01-23 | Stop reason: HOSPADM

## 2018-01-20 RX ORDER — FENTANYL CITRATE 50 UG/ML
25 INJECTION, SOLUTION INTRAMUSCULAR; INTRAVENOUS ONCE
Status: COMPLETED | OUTPATIENT
Start: 2018-01-20 | End: 2018-01-20

## 2018-01-20 RX ORDER — CARVEDILOL 3.12 MG/1
3.12 TABLET ORAL 2 TIMES DAILY
Status: DISCONTINUED | OUTPATIENT
Start: 2018-01-20 | End: 2018-01-23 | Stop reason: HOSPADM

## 2018-01-20 RX ORDER — DIPHENHYDRAMINE HCL 25 MG
25 TABLET ORAL ONCE
Status: COMPLETED | OUTPATIENT
Start: 2018-01-20 | End: 2018-01-20

## 2018-01-20 RX ADMIN — SODIUM CHLORIDE: 9 INJECTION, SOLUTION INTRAVENOUS at 21:48

## 2018-01-20 RX ADMIN — ASPIRIN 81 MG 324 MG: 81 TABLET ORAL at 18:44

## 2018-01-20 RX ADMIN — DIPHENHYDRAMINE HCL 25 MG: 25 TABLET ORAL at 22:40

## 2018-01-20 RX ADMIN — INSULIN LISPRO 2 UNITS: 100 INJECTION, SOLUTION INTRAVENOUS; SUBCUTANEOUS at 21:32

## 2018-01-20 RX ADMIN — CARVEDILOL 3.12 MG: 3.12 TABLET, FILM COATED ORAL at 21:48

## 2018-01-20 RX ADMIN — AMLODIPINE BESYLATE 5 MG: 5 TABLET ORAL at 21:48

## 2018-01-20 RX ADMIN — ATORVASTATIN CALCIUM 80 MG: 80 TABLET, FILM COATED ORAL at 21:46

## 2018-01-20 RX ADMIN — CITALOPRAM 20 MG: 20 TABLET, FILM COATED ORAL at 21:46

## 2018-01-20 RX ADMIN — ONDANSETRON 4 MG: 2 INJECTION INTRAMUSCULAR; INTRAVENOUS at 19:10

## 2018-01-20 RX ADMIN — METFORMIN HYDROCHLORIDE 1000 MG: 500 TABLET ORAL at 21:46

## 2018-01-20 RX ADMIN — FENTANYL CITRATE 25 MCG: 50 INJECTION, SOLUTION INTRAMUSCULAR; INTRAVENOUS at 20:00

## 2018-01-20 ASSESSMENT — ENCOUNTER SYMPTOMS
ABDOMINAL PAIN: 0
RHINORRHEA: 0
SHORTNESS OF BREATH: 0

## 2018-01-20 ASSESSMENT — PAIN SCALES - GENERAL
PAINLEVEL_OUTOF10: 6
PAINLEVEL_OUTOF10: 8

## 2018-01-20 NOTE — ED PROVIDER NOTES
Degeneration of lumbar or lumbosacral intervertebral disc; Depression; DM (diabetes mellitus) ([facility] [address] Emphysema of lung ([facility] [address] Gastritis; GERD (gastroesophageal reflux disease); Hearing loss; Hematuria; Hiatal hernia; HTN (hypertension), benign; Hypertriglyceridemia; Incontinence of urine; Knee arthropathy; Migraine headache; Mumps; On home oxygen therapy; HIGINIO on CPAP; Otitis media; Stroke ([facility] [address] Tinnitus; Type II or unspecified type diabetes mellitus without mention of complication, not stated as uncontrolled; UTI (lower urinary tract infection); and Varicose vein. has a past surgical history that includes Cholecystectomy (2007); Carpal tunnel release (Right); Tympanoplasty; Dilation & curettage (1979); Cystocopy (9/25/2013); Cataract removal with implant (Bilateral); Tonsillectomy; Incontinence surgery; ablation of dysrhythmic focus (2000); Upper gastrointestinal endoscopy; eye surgery; Tubal ligation; other surgical history (09/10/15); Insertable Cardiac Monitor (12/2015); Tympanoplasty; Colonoscopy; Colonoscopy (04/10/2017); colsc flx w/removal lesion by hot bx forceps (N/A, 4/10/2017); and Tympanostomy tube placement (08/21/2017).     Social History     Social History    Marital status: Single     Spouse name: N/A    Number of children: N/A    Years of education: N/A     Occupational History    disabled N/A     Social History Main Topics    Smoking status: Former Smoker     Packs/day: 3.00     Years: 41.00     Types: Cigarettes     Quit date: 1/1/2000    Smokeless tobacco: Never Used      Comment: quit in 2000    Alcohol use No    Drug use: No    Sexual activity: Not Currently     Other Topics Concern    Not on file     Social History Narrative    No narrative on file       Family History   Problem Relation Age of Onset    Diabetes Mother     Heart Disease Mother     Stomach Cancer Father     Diabetes Maternal Grandmother      also aunts and uncles (maternal)    Emphysema Sister orders placed or performed during the hospital encounter of 23/31/87   Basic Metabolic Panel   Result Value Ref Range    Glucose 154 (H) 70 - 99 mg/dL    BUN 14 8 - 23 mg/dL    CREATININE 1.04 (H) 0.50 - 0.90 mg/dL    Bun/Cre Ratio NOT REPORTED 9 - 20    Calcium 8.6 8.6 - 10.4 mg/dL    Sodium 134 (L) 135 - 144 mmol/L    Potassium 3.9 3.7 - 5.3 mmol/L    Chloride 97 (L) 98 - 107 mmol/L    CO2 24 20 - 31 mmol/L    Anion Gap 13 9 - 17 mmol/L    GFR Non-African American 53 (L) >60 mL/min    GFR African American >60 >60 mL/min    GFR Comment          GFR Staging NOT REPORTED    CBC Auto Differential   Result Value Ref Range    WBC 5.9 3.5 - 11.3 k/uL    RBC 4.22 3.95 - 5.11 m/uL    Hemoglobin 12.6 11.9 - 15.1 g/dL    Hematocrit 39.7 36.3 - 47.1 %    MCV 94.1 82.6 - 102.9 fL    MCH 29.9 25.2 - 33.5 pg    MCHC 31.7 28.4 - 34.8 g/dL    RDW 13.1 11.8 - 14.4 %    Platelets 405 517 - 501 k/uL    MPV 12.0 8.1 - 13.5 fL    NRBC Automated 0.0 0.0 per 100 WBC    Differential Type NOT REPORTED     Seg Neutrophils 57 36 - 65 %    Lymphocytes 28 24 - 43 %    Monocytes 13 (H) 3 - 12 %    Eosinophils % 1 1 - 4 %    Basophils 1 0 - 2 %    Immature Granulocytes 0 0 %    Segs Absolute 3.31 1.50 - 8.10 k/uL    Absolute Lymph # 1.65 1.10 - 3.70 k/uL    Absolute Mono # 0.78 0.10 - 1.20 k/uL    Absolute Eos # 0.05 0.00 - 0.44 k/uL    Basophils # 0.06 0.00 - 0.20 k/uL    Absolute Immature Granulocyte <0.03 0.00 - 0.30 k/uL    WBC Morphology NOT REPORTED     RBC Morphology NOT REPORTED     Platelet Estimate NOT REPORTED    POCT troponin   Result Value Ref Range    POC Troponin I 0.01 0.00 - 0.10 ng/mL    POC Troponin Interp       The Troponin-I (POC) results cannot be compared to the Troponin-T results.        RADIOLOGY:  Xr Chest Standard (2 Vw)    Result Date: 1/20/2018  EXAMINATION: TWO VIEWS OF THE CHEST 1/20/2018 6:28 pm COMPARISON: 10/13/2017 HISTORY: ORDERING SYSTEM PROVIDED HISTORY: chest pain TECHNOLOGIST PROVIDED HISTORY: Reason for

## 2018-01-20 NOTE — ED PROVIDER NOTES
[facility] ED     Emergency Department     Faculty Attestation    I performed a history and physical examination of the patient and discussed management with the resident. I reviewed the residents note and agree with the documented findings and plan of care. Any areas of disagreement are noted on the chart. I was personally present for the key portions of any procedures. I have documented in the chart those procedures where I was not present during the key portions. I have reviewed the emergency nurses triage note. I agree with the chief complaint, past medical history, past surgical history, allergies, medications, social and family history as documented unless otherwise noted below. For Physician Assistant/ Nurse Practitioner cases/documentation I have personally evaluated this patient and have completed at least one if not all key elements of the E/M (history, physical exam, and MDM). Additional findings are as noted. Patient brought in by EMS for low blood sugar. Patient says she started to get clammy and nauseous similar to how she felt with low blood sugar in the past.  When EMS arrived, they found her to have a blood sugar in the 30s. Patient did have several snacks and was then given a half amp of dextrose and blood sugar on arrival here is up in the 150s. Patient says she has been well and denies any recent illness. She says she did eat breakfast this morning but skipped lunch which she says she sometimes does. She says she did not have her normal sugary snacks as she usually does at night last night however. Patient takes insolent and metformin for her diabetes. On exam, patient is resting comfortably in the bed. She is alert and oriented and answering my questions appropriately. Patient does have a nasal cannula on which she says she uses 24 hours a day for her COPD. She does not report any increased shortness of breath today.   Lungs are clear to auscultation bilaterally heart sounds are normal.  We will observe patient and recheck blood sugar. We'll attempt to speak with patient's primary care physician. Patient now complaining of chest pain. EKG, chest x-ray, and labs and reassess.     EKG Interpretation    Interpreted by emergency department physician    Rhythm: normal sinus   Rate: normal  Axis: normal  Ectopy: none  Conduction: normal  ST Segments: normal  T Waves: non specific changes  Q Waves: none    Clinical Impression: non-specific EKG    Juan España MD  Attending Emergency  Physician              Moriah Ceballos MD  01/20/18 [address] Tamara Limon MD  01/20/18 2607       Moriah Ceballos MD  01/20/18 2504

## 2018-01-21 PROBLEM — M94.0 COSTOCHONDRITIS: Status: ACTIVE | Noted: 2018-01-21

## 2018-01-21 PROBLEM — N30.00 ACUTE CYSTITIS: Status: RESOLVED | Noted: 2017-04-07 | Resolved: 2018-01-21

## 2018-01-21 PROBLEM — F41.0 PANIC ATTACK: Status: RESOLVED | Noted: 2017-07-13 | Resolved: 2018-01-21

## 2018-01-21 PROBLEM — R20.0 LEFT SIDED NUMBNESS: Status: RESOLVED | Noted: 2017-07-13 | Resolved: 2018-01-21

## 2018-01-21 PROBLEM — R91.1 PULMONARY NODULE: Status: RESOLVED | Noted: 2017-04-07 | Resolved: 2018-01-21

## 2018-01-21 PROBLEM — K52.9 COLITIS: Status: RESOLVED | Noted: 2017-04-07 | Resolved: 2018-01-21

## 2018-01-21 PROBLEM — K57.90 DIVERTICULOSIS: Status: RESOLVED | Noted: 2017-04-07 | Resolved: 2018-01-21

## 2018-01-21 PROBLEM — R11.2 NAUSEA & VOMITING: Status: RESOLVED | Noted: 2017-04-07 | Resolved: 2018-01-21

## 2018-01-21 PROBLEM — R10.13 DYSPEPSIA: Status: ACTIVE | Noted: 2018-01-21

## 2018-01-21 LAB
-: ABNORMAL
AMORPHOUS: ABNORMAL
ANION GAP SERPL CALCULATED.3IONS-SCNC: 12 MMOL/L (ref 9–17)
BACTERIA: ABNORMAL
BILIRUBIN URINE: NEGATIVE
BUN BLDV-MCNC: 17 MG/DL (ref 8–23)
BUN/CREAT BLD: ABNORMAL (ref 9–20)
CALCIUM SERPL-MCNC: 8.4 MG/DL (ref 8.6–10.4)
CASTS UA: ABNORMAL /LPF (ref 0–2)
CHLORIDE BLD-SCNC: 100 MMOL/L (ref 98–107)
CHOLESTEROL/HDL RATIO: 3.1
CHOLESTEROL: 112 MG/DL
CO2: 26 MMOL/L (ref 20–31)
COLOR: ABNORMAL
COMMENT UA: ABNORMAL
CREAT SERPL-MCNC: 1.02 MG/DL (ref 0.5–0.9)
CRYSTALS, UA: ABNORMAL /HPF
D-DIMER QUANTITATIVE: 0.93 MG/L FEU
EPITHELIAL CELLS UA: ABNORMAL /HPF (ref 0–5)
GFR AFRICAN AMERICAN: >60 ML/MIN
GFR NON-AFRICAN AMERICAN: 54 ML/MIN
GFR SERPL CREATININE-BSD FRML MDRD: ABNORMAL ML/MIN/{1.73_M2}
GFR SERPL CREATININE-BSD FRML MDRD: ABNORMAL ML/MIN/{1.73_M2}
GLUCOSE BLD-MCNC: 130 MG/DL (ref 65–105)
GLUCOSE BLD-MCNC: 137 MG/DL (ref 65–105)
GLUCOSE BLD-MCNC: 141 MG/DL (ref 65–105)
GLUCOSE BLD-MCNC: 69 MG/DL (ref 70–99)
GLUCOSE BLD-MCNC: 82 MG/DL (ref 65–105)
GLUCOSE URINE: NEGATIVE
HDLC SERPL-MCNC: 36 MG/DL
KETONES, URINE: ABNORMAL
LDL CHOLESTEROL: 46 MG/DL (ref 0–130)
LEUKOCYTE ESTERASE, URINE: ABNORMAL
MUCUS: ABNORMAL
NITRITE, URINE: NEGATIVE
OTHER OBSERVATIONS UA: ABNORMAL
PH UA: 5 (ref 5–8)
POTASSIUM SERPL-SCNC: 3.7 MMOL/L (ref 3.7–5.3)
PROTEIN UA: NEGATIVE
RBC UA: ABNORMAL /HPF (ref 0–2)
RENAL EPITHELIAL, UA: ABNORMAL /HPF
SODIUM BLD-SCNC: 138 MMOL/L (ref 135–144)
SPECIFIC GRAVITY UA: 1.02 (ref 1–1.03)
TRICHOMONAS: ABNORMAL
TRIGL SERPL-MCNC: 150 MG/DL
TURBIDITY: ABNORMAL
URINE HGB: NEGATIVE
UROBILINOGEN, URINE: NORMAL
VLDLC SERPL CALC-MCNC: ABNORMAL MG/DL (ref 1–30)
WBC UA: ABNORMAL /HPF (ref 0–5)
YEAST: ABNORMAL

## 2018-01-21 PROCEDURE — 6360000002 HC RX W HCPCS: Performed by: STUDENT IN AN ORGANIZED HEALTH CARE EDUCATION/TRAINING PROGRAM

## 2018-01-21 PROCEDURE — 2060000000 HC ICU INTERMEDIATE R&B

## 2018-01-21 PROCEDURE — 80048 BASIC METABOLIC PNL TOTAL CA: CPT

## 2018-01-21 PROCEDURE — G0378 HOSPITAL OBSERVATION PER HR: HCPCS

## 2018-01-21 PROCEDURE — 93005 ELECTROCARDIOGRAM TRACING: CPT

## 2018-01-21 PROCEDURE — 36415 COLL VENOUS BLD VENIPUNCTURE: CPT

## 2018-01-21 PROCEDURE — 85379 FIBRIN DEGRADATION QUANT: CPT

## 2018-01-21 PROCEDURE — 2500000003 HC RX 250 WO HCPCS: Performed by: EMERGENCY MEDICINE

## 2018-01-21 PROCEDURE — 99223 1ST HOSP IP/OBS HIGH 75: CPT | Performed by: INTERNAL MEDICINE

## 2018-01-21 PROCEDURE — 82947 ASSAY GLUCOSE BLOOD QUANT: CPT

## 2018-01-21 PROCEDURE — 87086 URINE CULTURE/COLONY COUNT: CPT

## 2018-01-21 PROCEDURE — 94660 CPAP INITIATION&MGMT: CPT

## 2018-01-21 PROCEDURE — 6370000000 HC RX 637 (ALT 250 FOR IP): Performed by: STUDENT IN AN ORGANIZED HEALTH CARE EDUCATION/TRAINING PROGRAM

## 2018-01-21 PROCEDURE — 96365 THER/PROPH/DIAG IV INF INIT: CPT

## 2018-01-21 PROCEDURE — 83036 HEMOGLOBIN GLYCOSYLATED A1C: CPT

## 2018-01-21 PROCEDURE — 81001 URINALYSIS AUTO W/SCOPE: CPT

## 2018-01-21 PROCEDURE — 6370000000 HC RX 637 (ALT 250 FOR IP): Performed by: EMERGENCY MEDICINE

## 2018-01-21 PROCEDURE — 80061 LIPID PANEL: CPT

## 2018-01-21 PROCEDURE — 96366 THER/PROPH/DIAG IV INF ADDON: CPT

## 2018-01-21 PROCEDURE — 2580000003 HC RX 258: Performed by: EMERGENCY MEDICINE

## 2018-01-21 RX ORDER — OXYCODONE HYDROCHLORIDE AND ACETAMINOPHEN 5; 325 MG/1; MG/1
1 TABLET ORAL EVERY 4 HOURS PRN
Status: DISCONTINUED | OUTPATIENT
Start: 2018-01-21 | End: 2018-01-23 | Stop reason: HOSPADM

## 2018-01-21 RX ORDER — NITROGLYCERIN 0.4 MG/1
0.4 TABLET SUBLINGUAL EVERY 5 MIN PRN
Status: DISCONTINUED | OUTPATIENT
Start: 2018-01-21 | End: 2018-01-23 | Stop reason: HOSPADM

## 2018-01-21 RX ORDER — NITROGLYCERIN 20 MG/100ML
5 INJECTION INTRAVENOUS CONTINUOUS
Status: DISCONTINUED | OUTPATIENT
Start: 2018-01-21 | End: 2018-01-21

## 2018-01-21 RX ORDER — INSULIN GLARGINE 100 [IU]/ML
35 INJECTION, SOLUTION SUBCUTANEOUS NIGHTLY
Status: DISCONTINUED | OUTPATIENT
Start: 2018-01-21 | End: 2018-01-23 | Stop reason: HOSPADM

## 2018-01-21 RX ORDER — NICOTINE POLACRILEX 4 MG
15 LOZENGE BUCCAL PRN
Status: DISCONTINUED | OUTPATIENT
Start: 2018-01-21 | End: 2018-01-23 | Stop reason: HOSPADM

## 2018-01-21 RX ORDER — UREA 10 %
1 LOTION (ML) TOPICAL NIGHTLY PRN
Status: DISCONTINUED | OUTPATIENT
Start: 2018-01-22 | End: 2018-01-21

## 2018-01-21 RX ORDER — DEXTROSE MONOHYDRATE 25 G/50ML
12.5 INJECTION, SOLUTION INTRAVENOUS PRN
Status: DISCONTINUED | OUTPATIENT
Start: 2018-01-21 | End: 2018-01-23 | Stop reason: HOSPADM

## 2018-01-21 RX ORDER — DEXTROSE MONOHYDRATE 50 MG/ML
100 INJECTION, SOLUTION INTRAVENOUS PRN
Status: DISCONTINUED | OUTPATIENT
Start: 2018-01-21 | End: 2018-01-23 | Stop reason: HOSPADM

## 2018-01-21 RX ORDER — PROMETHAZINE HYDROCHLORIDE 25 MG/ML
6.25 INJECTION, SOLUTION INTRAMUSCULAR; INTRAVENOUS EVERY 6 HOURS PRN
Status: DISCONTINUED | OUTPATIENT
Start: 2018-01-21 | End: 2018-01-23 | Stop reason: HOSPADM

## 2018-01-21 RX ORDER — ACETAMINOPHEN 325 MG/1
650 TABLET ORAL EVERY 4 HOURS PRN
Status: DISCONTINUED | OUTPATIENT
Start: 2018-01-21 | End: 2018-01-23 | Stop reason: HOSPADM

## 2018-01-21 RX ORDER — UREA 10 %
1 LOTION (ML) TOPICAL NIGHTLY PRN
Status: DISCONTINUED | OUTPATIENT
Start: 2018-01-21 | End: 2018-01-23 | Stop reason: HOSPADM

## 2018-01-21 RX ORDER — ONDANSETRON 2 MG/ML
4 INJECTION INTRAMUSCULAR; INTRAVENOUS EVERY 6 HOURS PRN
Status: DISCONTINUED | OUTPATIENT
Start: 2018-01-21 | End: 2018-01-23 | Stop reason: HOSPADM

## 2018-01-21 RX ORDER — PANTOPRAZOLE SODIUM 40 MG/1
40 TABLET, DELAYED RELEASE ORAL
Status: DISCONTINUED | OUTPATIENT
Start: 2018-01-22 | End: 2018-01-23 | Stop reason: HOSPADM

## 2018-01-21 RX ORDER — ASPIRIN 81 MG/1
324 TABLET, CHEWABLE ORAL ONCE
Status: COMPLETED | OUTPATIENT
Start: 2018-01-21 | End: 2018-01-21

## 2018-01-21 RX ORDER — PANTOPRAZOLE SODIUM 40 MG/10ML
40 INJECTION, POWDER, LYOPHILIZED, FOR SOLUTION INTRAVENOUS DAILY
Status: DISCONTINUED | OUTPATIENT
Start: 2018-01-21 | End: 2018-01-21 | Stop reason: CLARIF

## 2018-01-21 RX ORDER — 0.9 % SODIUM CHLORIDE 0.9 %
10 VIAL (ML) INJECTION DAILY
Status: DISCONTINUED | OUTPATIENT
Start: 2018-01-21 | End: 2018-01-21 | Stop reason: CLARIF

## 2018-01-21 RX ADMIN — ATORVASTATIN CALCIUM 80 MG: 80 TABLET, FILM COATED ORAL at 09:36

## 2018-01-21 RX ADMIN — ONDANSETRON 4 MG: 2 INJECTION INTRAMUSCULAR; INTRAVENOUS at 14:42

## 2018-01-21 RX ADMIN — CLOPIDOGREL 75 MG: 75 TABLET, FILM COATED ORAL at 09:36

## 2018-01-21 RX ADMIN — Medication 1 MG: at 22:50

## 2018-01-21 RX ADMIN — CITALOPRAM 40 MG: 20 TABLET, FILM COATED ORAL at 09:36

## 2018-01-21 RX ADMIN — ASPIRIN 81 MG 324 MG: 81 TABLET ORAL at 11:55

## 2018-01-21 RX ADMIN — OXYCODONE HYDROCHLORIDE AND ACETAMINOPHEN 1 TABLET: 5; 325 TABLET ORAL at 22:48

## 2018-01-21 RX ADMIN — SODIUM CHLORIDE: 9 INJECTION, SOLUTION INTRAVENOUS at 13:14

## 2018-01-21 RX ADMIN — METFORMIN HYDROCHLORIDE 1000 MG: 500 TABLET ORAL at 09:36

## 2018-01-21 RX ADMIN — CARVEDILOL 3.12 MG: 3.12 TABLET, FILM COATED ORAL at 09:36

## 2018-01-21 RX ADMIN — INSULIN GLARGINE 35 UNITS: 100 INJECTION, SOLUTION SUBCUTANEOUS at 22:17

## 2018-01-21 RX ADMIN — ISOSORBIDE MONONITRATE 30 MG: 30 TABLET ORAL at 09:36

## 2018-01-21 RX ADMIN — PARICALCITOL 4 G: 1 CAPSULE ORAL at 22:16

## 2018-01-21 RX ADMIN — NITROGLYCERIN 5 MCG/MIN: 20 INJECTION INTRAVENOUS at 13:14

## 2018-01-21 RX ADMIN — AMLODIPINE BESYLATE 5 MG: 5 TABLET ORAL at 09:36

## 2018-01-21 RX ADMIN — OXYCODONE HYDROCHLORIDE AND ACETAMINOPHEN 1 TABLET: 5; 325 TABLET ORAL at 11:53

## 2018-01-21 RX ADMIN — LOSARTAN POTASSIUM 25 MG: 25 TABLET, FILM COATED ORAL at 09:36

## 2018-01-21 ASSESSMENT — PAIN SCALES - GENERAL
PAINLEVEL_OUTOF10: 5
PAINLEVEL_OUTOF10: 6
PAINLEVEL_OUTOF10: 7
PAINLEVEL_OUTOF10: 4
PAINLEVEL_OUTOF10: 0
PAINLEVEL_OUTOF10: 6

## 2018-01-21 NOTE — H&P
Tympanostomy tube placement (08/21/2017). Home Medications:    Prior to Admission medications    Medication Sig Start Date End Date Taking? Authorizing Provider   oxyCODONE-acetaminophen (PERCOCET) 5-325 MG per tablet Take 1 tablet by mouth every 6 hours as needed for Pain (take one tablet three to four times a day as needed for lower back pain.) .  Earliest Fill Date: 12/29/17 12/29/17   VERONIKA Baldwin   losartan (COZAAR) 25 MG tablet TAKE 1 TAB BY MOUTH ONCE A DAY 12/4/17   Stacia Mancera MD   topiramate (TOPAMAX) 100 MG tablet Take 1 tablet by mouth nightly 11/29/17   Kanika Ortiz MD   atorvastatin (LIPITOR) 80 MG tablet TAKE 1 TAB BY MOUTH ONCE A DAY 11/17/17   Kyaw Rosen MD   carvedilol (COREG) 3.125 MG tablet TAKE 1 TAB BY MOUTH TWICE A DAY 11/13/17   Stacia Mancera MD   Melatonin 10 MG TABS Take 10 mg by mouth nightly 10/19/17   Stacia Mancera MD   furosemide (LASIX) 20 MG tablet TAKE 1 TABLET BY MOUTH AS NEEDED (LEG SWELLING) 10/18/17   Stacia Mancera MD   citalopram (CELEXA) 40 MG tablet TAKE 1 TAB BY MOUTH ONCE A DAY 10/18/17   Stacia Mancera MD   clopidogrel (PLAVIX) 75 MG tablet TAKE 1 TAB BY MOUTH ONCE A DAY 10/18/17   Stacia Mancera MD   Compression Stockings MISC by Does not apply route Pressure between 20 - 25 . 9/11/17   Stacia Mancera MD   ranitidine (ZANTAC) 150 MG tablet  7/19/17   Historical Provider, MD   insulin glargine (LANTUS SOLOSTAR) 100 UNIT/ML injection pen Inject 35 Units into the skin nightly 7/13/17   Venkat Fajardo MD   insulin glargine (LANTUS SOLOSTAR) 100 UNIT/ML injection pen Inject 45 Units into the skin every morning 7/13/17   Venkat Fajardo MD   amLODIPine (NORVASC) 5 MG tablet TAKE 1 TAB BY MOUTH ONCE A DAY 6/29/17   Ginger Nettles MD   diphenhydrAMINE (BENADRYL) 25 MG capsule TAKE 1 TABLET BY MOUTH NIGHTLY AS NEEDED (INSOMNIA) 3/16/17   Ginger Nettles MD   pantoprazole (PROTONIX) 40 MG tablet Take 1 tablet by mouth 2 times daily 3/7/17   Mary She has a 123.00 pack-year smoking history. She has never used smokeless tobacco. She reports that she does not drink alcohol or use drugs. Family History: family history includes Diabetes in her maternal grandmother and mother; Emphysema in her sister; Heart Disease in her mother; Stomach Cancer in her father. REVIEW OF SYSTEMS:    · Constitutional: there has been no unanticipated weight loss. There's been No change in energy level, No change in activity level. · Eyes: No visual changes or diplopia. No scleral icterus. · ENT: No Headaches  · Cardiovascular:chest pain  · Respiratory: dyspnea  · Gastrointestinal: dyspepsia  · Genitourinary: No dysuria, trouble voiding, or hematuria. · Musculoskeletal:  No gait disturbance, No weakness or joint complaints. · Integumentary: No rash or pruritis. · Neurological: No headache, diplopia, change in muscle strength, numbness or tingling. No change in gait, balance, coordination, mood, affect, memory, mentation, behavior. · Psychiatric: No anxiety, or depression. · Endocrine: No temperature intolerance. No excessive thirst, fluid intake, or urination. No tremor. · Hematologic/Lymphatic: No abnormal bruising or bleeding, blood clots or swollen lymph nodes. · Allergic/Immunologic: No nasal congestion or hives. PHYSICAL EXAM:      /75   Pulse 70   Temp 97.7 °F (36.5 °C) (Oral)   Resp 22   Ht 5' 2\" (1.575 m)   Wt 230 lb 9.6 oz (104.6 kg)   LMP  (LMP Unknown)   SpO2 97%   BMI 42.18 kg/m²    Constitutional and General Appearance:   · On alert, cooperative, no distress and appears stated age  HEENT:  · PERRL, no cervical lymphadenopathy. No masses palpable. Normal oral mucosa  Respiratory:  · Normal excursion and expansion without use of accessory muscles  · Resp Auscultation: Good respiratory effort.  No for increased work of breathing  · On auscultation: clear to auscultation bilaterally  Cardiovascular:  · The apical impulse is not is prolonged, add Prometazine     Costochondritis   Topical Diclofenac plus tylenol 650 q6  PRN  Ddimer to rule out VTE, low risk Wells' Criteria for Pulmonary Embolism  Normal saline 100ml/hr  DVT prophylaxis with Lovenox     Type 2 DM  35 unit of Lantus and low dose sliding scale  Hypoglycemia protocol     Jose Maravilla MD PGY 2  Internal Medicine Resident    Charles   1/21/2018 at 3:57 PM    Attending Physician Statement  I have discussed the care of Sanna French, including pertinent history and exam findings with the resident. I have reviewed the key elements of all parts of the encounter with the resident. I have seen and examined the patient with the resident and the key elements of all parts of the encounter have been performed by me. Patient is a 50-year-old lady who presented with chest pain  PMH, vitals, labs, medications reviewed  She was initially admitted under observation and was placed on a nitro drip  Now she is off the nitro drip still complains of some residual pain  Chest wall is tender on exam  Cardiology has been consulted, await further recommendations  She is currently on Plavix, losartan, carvedilol, atorvastatin. Also on amlodipine. History of diabetes-currently on Lantus 35 units.   A1c is 6.6  Elevated d-dimer-we will get a CTA  UTI-start Keflex  Continue to monitor QT

## 2018-01-21 NOTE — CARE COORDINATION
Case Management Initial Discharge [address] \[facility] [place] [facility]\"",         Readmission Risk              Readmission Risk:        26.75       Age 72 or Greater:  1    Admitted from SNF or Requires Paid or Family Care:  2    Currently has CHF,COPD,ARF,CRI,or is on dialysis:  4    Takes more than 5 Prescription Medications:  4    Takes Digoxin,Insulin,Anticoagulants,Narcotics or ASA/Plavix:  [address] in Past 12 Months:  10    On Disability:  0    Patient Considers own Health:  3.75            Met with:patient to discuss discharge plans. Information verified: address, contacts, phone number, , insurance Yes  PCP: Bonny Eason MD  Date of last visit: 1-2 months ago    Insurance Provider: [place] Medicare, Medicaid    Discharge Planning  Current Residence:  Private Residence  Living Arrangements:  Alone   Home has 1 stories/no stairs to climb  Support Systems:  Children  Current Services PTA:  Durable Medical Equipment, Home Care, Oxygen Therapy Supplier: [facility]  Patient able to perform ADL's:Assisted  DME used to aid ambulation prior to admission: walker scooter/during admission same    Potential Assistance Needed:       Pharmacy: [facility]   Potential Assistance Purchasing Medications:  No  Does patient want to participate in local refill/ meds to beds program?  No    Patient agreeable to home care: No  [place] of choice provided:  n/a      Type of Home Care Services:  [address]  Patient expects to be discharged to:  Home    Prior SNF/Rehab Placement and Facility: [facility] several years ago  Agreeable to SNF/Rehab: No  [place] of choice provided: n/a   Evaluation: n/a    Expected Discharge date: Follow Up Appointment: Best Day/ Time:      Transportation provider: medical cab  Transportation arrangements needed for discharge: No    Discharge Plan: Pt lives alone in a one level apartment. Pt has home O2 supplied by [facility].  Pt has a portable tank available for transport

## 2018-01-21 NOTE — DISCHARGE SUMMARY
ext.  Skin -warm, dry      Hospital Course:   Jez Guerra originally presented to the hospital on 1/20/2018  5:06 PM with acute midsternal pressure-like chest pain most likely etiology and occasional chest pain versus musculoskeletal chest pain.     Patient presents with acute midsternal pressure-like chest pain, nonradiating, associated with shortness of breath and nausea and lightheadedness, not made worse with movement, not made better by anything, nonradiating, intermittent. Patient originally presented to Hospital due to hypoglycemia but this is since resolved. Patient has history of CAD, diabetes mellitus, coronary artery disease on aspirin and Plavix. Patient does not smoke.     Patient had a workup in emergency department consisting of EKG, troponins and chest x-ray and was admitted to observation unit for cardiology consultation. Patient concerning for unstable angina, transferred to medicine service. Labs and imaging were followed daily. At time of discharge, Jez Guerra was tolerating a PO intake well, passing flatus, urinating adequately, ambulating and had adequate analgesia on oral pain medications. She is medically stable to be discharged. Clinical course has improved. I feel the patient can be safely discharged to home with outpatient follow up. Instructions have been given for the patient to return to the ED for any worsening of the symptoms, including but not limited to increased pain, shortness of breath, weakness, or any deterioration of their current condition. Disposition: Pending    Condition: Good      Patient stable and ready for discharge home. I have discussed plan of care with patient and they are in understanding. They were instructed to read discharge paperwork. All of their questions and concerns were addressed. Patient states that they understand the plan and agree with the plan.     Time Spent: 10        Sameer Dugan MD  Emergency Medicine Resident

## 2018-01-21 NOTE — H&P
used smokeless tobacco. She reports that she does not drink alcohol or use drugs. I have reviewed and agree with all Social.  There are no concerns for substance abuse/use. PHYSICAL EXAM     INITIAL VITALS:  height is 5' 2\" (1.575 m) and weight is 230 lb 9.6 oz (104.6 kg). Her oral temperature is 97.7 °F (36.5 °C). Her blood pressure is 108/75 and her pulse is 70. Her respiration is 22 and oxygen saturation is 97%.       CONSTITUTIONAL: AOx4, no apparent distress, appears stated age    HEAD: normocephalic, atraumatic   EYES: PERRLA, EOMI    ENT: moist mucous membranes, uvula midline   NECK: supple, symmetric   BACK: symmetric   LUNGS: clear to auscultation bilaterally   CARDIOVASCULAR: regular rate and rhythm, no murmurs, rubs or gallops   ABDOMEN: soft, non-tender, non-distended with normal active bowel sounds   NEUROLOGIC:  MAEx4, no focal sensory or motor deficits   MUSCULOSKELETAL: no clubbing, cyanosis or edema   SKIN: no rash or wounds       DIFFERENTIAL DIAGNOSIS/MDM:     Emergent: ACS/NSTEMI/STEMI/angina, arrhythmia, trauma, aortic dissection,  PE, PNA, pneumothroax, esophageal rupture, tamponade, Cocaine use  Nonemergent: pneumonia, pericarditis, GERD, MSK, Endocarditis, anxiety     Evaluate for: diaphoresis, present chest pain, tachypnea, BP both arms, heart sounds, JVD, tender chest wall, wheezing      DIAGNOSTIC RESULTS     EKG: All EKG's are interpreted by the Observation Physician who either signs or Co-signs this chart in the absence of a cardiologist.    EKG Interpretation    Interpreted by observation physician    Rhythm: normal sinus   Rate: normal  Axis: normal  Ectopy: none  Conduction: normal  ST Segments: normal  T Waves: flattening in precordial leads  Q Waves: none     Clinical Impression: non-specific EKG    Luke Kerr MD        RADIOLOGY:   I directly visualized the following  images and reviewed the radiologist interpretations:    Xr Chest Standard (2 Vw)    Result Date: 1/20/2018  EXAMINATION: TWO VIEWS OF THE CHEST 1/20/2018 6:28 pm COMPARISON: 10/13/2017 HISTORY: ORDERING SYSTEM PROVIDED HISTORY: chest pain TECHNOLOGIST PROVIDED HISTORY: Reason for exam:->chest pain Left-sided chest pain and nausea FINDINGS: The lungs are without acute focal process. There is no effusion or pneumothorax. The cardiomediastinal silhouette is stable. The osseous structures are stable. No acute process. LABS:  I have reviewed and interpreted all available lab results.   Labs Reviewed   BASIC METABOLIC PANEL - Abnormal; Notable for the following:        Result Value    Glucose 154 (*)     CREATININE 1.04 (*)     Sodium 134 (*)     Chloride 97 (*)     GFR Non- 53 (*)     All other components within normal limits   CBC WITH AUTO DIFFERENTIAL - Abnormal; Notable for the following:     Monocytes 13 (*)     All other components within normal limits   BASIC METABOLIC PANEL - Abnormal; Notable for the following:     Glucose 69 (*)     CREATININE 1.02 (*)     Calcium 8.4 (*)     GFR Non- 54 (*)     All other components within normal limits   POC GLUCOSE FINGERSTICK - Abnormal; Notable for the following:     POC Glucose 150 (*)     All other components within normal limits   POC GLUCOSE FINGERSTICK - Abnormal; Notable for the following:     POC Glucose 202 (*)     All other components within normal limits   POC GLUCOSE FINGERSTICK - Abnormal; Notable for the following:     POC Glucose 141 (*)     All other components within normal limits   TROPONIN   TROPONIN   UA W/REFLEX CULTURE   HEMOGLOBIN A1C   LIPID PANEL   D-DIMER, QUANTITATIVE   POCT TROPONIN   POCT TROPONIN   POC GLUCOSE FINGERSTICK   POCT GLUCOSE   POCT GLUCOSE   POCT GLUCOSE           SCREENING TOOLS:    HEART Risk Score for Chest Pain Patients   History and Physical Exam Suspicion Level  (Nausea, Vomiting, Diaphoresis, Radiation, Exertion)   Slightly Suspicious (0 pts)   Moderately Suspicious (1 pt)   Highly Suspicious (2 pts)   EKG Interpretation   Normal (0 pts)   Non-Specific Repolarization Disturbance (1 pt)   Significant ST-Depression (2 pts)   Age of Patient (in years)   = 39 (0 pts)   46-64 (1 pt)   = 65 (2 pts)   Risk Factors   No Risk Factors (0 pts)   1-2 Risk Factors (1 pt)   = 3 Risk Factors (2 pts)   Risk Factors Include:   Hypercholesterolemia   Hypertension   Diabetes Mellitus   Cigarette smoking   Positive family history   Obesity   CAD   (SLE, CKDz, HIV, Cocaine abuse)   Troponin Levels   = Normal Limit (0 pts)   1-3 Times Normal Limit (1 pt)   > 3 Times Normal Limit (2 pts)  TOTAL: 6    Percent Risk for Major Adverse Cardiac Event (MACE)  0-3 pts indicates low risk for MACE   2.5% (DISCHARGE)   4-7 pts indicates moderate risk for MACE  20.3% (OBS)  8-10 pts indicates high risk for MACE  72.7% (EARLY INVASIVE [place])    CDU IMPRESSION       Braulio Amezquita is a 75 y.o. female who presents with    1. Acute midsternal pressure-like chest pain most likely etiology and occasional chest pain versus musculoskeletal chest pain         CDU PLAN     · Acute midsternal pressure-like chest pain: cardiology consultation, does appear to be anginal chest pain, will start nitro drip and aspirin and transfer to medicine service for further cardiology workup  · Transfer today to med/surg  IP CONSULT TO CARDIOLOGY  · Further workup and evaluation   · Follow up recommendations     · Continue home meds, pain control   · Monitor vitals, labs, imaging         CONSULTS:    IP CONSULT TO CARDIOLOGY    PROCEDURES:  Not indicated       PATIENT REFERRED TO:    Krystle Bhadrwaj MD  [address] [place] [address] [id number] [phone]            --  Kathryn Luis MD   Emergency Medicine Resident     This dictation was generated by voice recognition computer software. Although all attempts are made to edit the dictation for accuracy, there may be errors in the transcription that are not intended.

## 2018-01-22 ENCOUNTER — APPOINTMENT (OUTPATIENT)
Dept: CT IMAGING | Age: 68
DRG: 206 | End: 2018-01-22
Payer: COMMERCIAL

## 2018-01-22 LAB
ANION GAP SERPL CALCULATED.3IONS-SCNC: 15 MMOL/L (ref 9–17)
BUN BLDV-MCNC: 20 MG/DL (ref 8–23)
BUN/CREAT BLD: ABNORMAL (ref 9–20)
CALCIUM SERPL-MCNC: 8.4 MG/DL (ref 8.6–10.4)
CHLORIDE BLD-SCNC: 100 MMOL/L (ref 98–107)
CO2: 23 MMOL/L (ref 20–31)
CREAT SERPL-MCNC: 1.03 MG/DL (ref 0.5–0.9)
CULTURE: NORMAL
CULTURE: NORMAL
EKG ATRIAL RATE: 70 BPM
EKG ATRIAL RATE: 73 BPM
EKG ATRIAL RATE: 74 BPM
EKG P AXIS: 45 DEGREES
EKG P AXIS: 48 DEGREES
EKG P AXIS: 78 DEGREES
EKG P-R INTERVAL: 128 MS
EKG P-R INTERVAL: 134 MS
EKG P-R INTERVAL: 136 MS
EKG Q-T INTERVAL: 352 MS
EKG Q-T INTERVAL: 352 MS
EKG Q-T INTERVAL: 474 MS
EKG QRS DURATION: 64 MS
EKG QRS DURATION: 68 MS
EKG QRS DURATION: 68 MS
EKG QTC CALCULATION (BAZETT): 387 MS
EKG QTC CALCULATION (BAZETT): 390 MS
EKG QTC CALCULATION (BAZETT): 511 MS
EKG R AXIS: -27 DEGREES
EKG R AXIS: -5 DEGREES
EKG R AXIS: -7 DEGREES
EKG T AXIS: 66 DEGREES
EKG T AXIS: 80 DEGREES
EKG T AXIS: 86 DEGREES
EKG VENTRICULAR RATE: 70 BPM
EKG VENTRICULAR RATE: 73 BPM
EKG VENTRICULAR RATE: 74 BPM
ESTIMATED AVERAGE GLUCOSE: 143 MG/DL
GFR AFRICAN AMERICAN: >60 ML/MIN
GFR NON-AFRICAN AMERICAN: 53 ML/MIN
GFR SERPL CREATININE-BSD FRML MDRD: ABNORMAL ML/MIN/{1.73_M2}
GFR SERPL CREATININE-BSD FRML MDRD: ABNORMAL ML/MIN/{1.73_M2}
GLUCOSE BLD-MCNC: 131 MG/DL (ref 65–105)
GLUCOSE BLD-MCNC: 176 MG/DL (ref 65–105)
GLUCOSE BLD-MCNC: 178 MG/DL (ref 65–105)
GLUCOSE BLD-MCNC: 193 MG/DL (ref 70–99)
GLUCOSE BLD-MCNC: 227 MG/DL (ref 65–105)
GLUCOSE BLD-MCNC: 258 MG/DL (ref 65–105)
HBA1C MFR BLD: 6.6 % (ref 4–6)
Lab: NORMAL
POTASSIUM SERPL-SCNC: 4.3 MMOL/L (ref 3.7–5.3)
SODIUM BLD-SCNC: 138 MMOL/L (ref 135–144)
SPECIMEN DESCRIPTION: NORMAL
STATUS: NORMAL
TROPONIN INTERP: NORMAL
TROPONIN INTERP: NORMAL
TROPONIN T: <0.03 NG/ML
TROPONIN T: <0.03 NG/ML

## 2018-01-22 PROCEDURE — 96361 HYDRATE IV INFUSION ADD-ON: CPT

## 2018-01-22 PROCEDURE — G0378 HOSPITAL OBSERVATION PER HR: HCPCS

## 2018-01-22 PROCEDURE — 84484 ASSAY OF TROPONIN QUANT: CPT

## 2018-01-22 PROCEDURE — 6370000000 HC RX 637 (ALT 250 FOR IP): Performed by: EMERGENCY MEDICINE

## 2018-01-22 PROCEDURE — 93005 ELECTROCARDIOGRAM TRACING: CPT

## 2018-01-22 PROCEDURE — 2580000003 HC RX 258: Performed by: STUDENT IN AN ORGANIZED HEALTH CARE EDUCATION/TRAINING PROGRAM

## 2018-01-22 PROCEDURE — 82947 ASSAY GLUCOSE BLOOD QUANT: CPT

## 2018-01-22 PROCEDURE — 6360000002 HC RX W HCPCS: Performed by: STUDENT IN AN ORGANIZED HEALTH CARE EDUCATION/TRAINING PROGRAM

## 2018-01-22 PROCEDURE — 94762 N-INVAS EAR/PLS OXIMTRY CONT: CPT

## 2018-01-22 PROCEDURE — 36415 COLL VENOUS BLD VENIPUNCTURE: CPT

## 2018-01-22 PROCEDURE — 6370000000 HC RX 637 (ALT 250 FOR IP): Performed by: STUDENT IN AN ORGANIZED HEALTH CARE EDUCATION/TRAINING PROGRAM

## 2018-01-22 PROCEDURE — 96375 TX/PRO/DX INJ NEW DRUG ADDON: CPT

## 2018-01-22 PROCEDURE — 80048 BASIC METABOLIC PNL TOTAL CA: CPT

## 2018-01-22 PROCEDURE — 1200000000 HC SEMI PRIVATE

## 2018-01-22 PROCEDURE — 96376 TX/PRO/DX INJ SAME DRUG ADON: CPT

## 2018-01-22 PROCEDURE — 96372 THER/PROPH/DIAG INJ SC/IM: CPT

## 2018-01-22 PROCEDURE — 2060000000 HC ICU INTERMEDIATE R&B

## 2018-01-22 PROCEDURE — 94660 CPAP INITIATION&MGMT: CPT

## 2018-01-22 RX ORDER — CEPHALEXIN 500 MG/1
500 CAPSULE ORAL EVERY 8 HOURS SCHEDULED
Qty: 12 CAPSULE | Refills: 0 | Status: SHIPPED | OUTPATIENT
Start: 2018-01-22 | End: 2018-01-26

## 2018-01-22 RX ORDER — CEPHALEXIN 500 MG/1
500 CAPSULE ORAL EVERY 8 HOURS SCHEDULED
Status: DISCONTINUED | OUTPATIENT
Start: 2018-01-22 | End: 2018-01-23 | Stop reason: HOSPADM

## 2018-01-22 RX ORDER — 0.9 % SODIUM CHLORIDE 0.9 %
2000 INTRAVENOUS SOLUTION INTRAVENOUS ONCE
Status: COMPLETED | OUTPATIENT
Start: 2018-01-22 | End: 2018-01-22

## 2018-01-22 RX ORDER — DIPHENHYDRAMINE HYDROCHLORIDE 50 MG/ML
50 INJECTION INTRAMUSCULAR; INTRAVENOUS ONCE
Status: COMPLETED | OUTPATIENT
Start: 2018-01-22 | End: 2018-01-22

## 2018-01-22 RX ORDER — METHYLPREDNISOLONE SODIUM SUCCINATE 40 MG/ML
40 INJECTION, POWDER, LYOPHILIZED, FOR SOLUTION INTRAMUSCULAR; INTRAVENOUS ONCE
Status: COMPLETED | OUTPATIENT
Start: 2018-01-22 | End: 2018-01-22

## 2018-01-22 RX ADMIN — OXYCODONE HYDROCHLORIDE AND ACETAMINOPHEN 1 TABLET: 5; 325 TABLET ORAL at 08:07

## 2018-01-22 RX ADMIN — INSULIN GLARGINE 35 UNITS: 100 INJECTION, SOLUTION SUBCUTANEOUS at 20:07

## 2018-01-22 RX ADMIN — PARICALCITOL 4 G: 1 CAPSULE ORAL at 08:14

## 2018-01-22 RX ADMIN — METHYLPREDNISOLONE SODIUM SUCCINATE 40 MG: 40 INJECTION, POWDER, FOR SOLUTION INTRAMUSCULAR; INTRAVENOUS at 17:08

## 2018-01-22 RX ADMIN — PANTOPRAZOLE SODIUM 40 MG: 40 TABLET, DELAYED RELEASE ORAL at 07:04

## 2018-01-22 RX ADMIN — LOSARTAN POTASSIUM 25 MG: 25 TABLET, FILM COATED ORAL at 08:13

## 2018-01-22 RX ADMIN — ATORVASTATIN CALCIUM 80 MG: 80 TABLET, FILM COATED ORAL at 20:06

## 2018-01-22 RX ADMIN — CLOPIDOGREL 75 MG: 75 TABLET, FILM COATED ORAL at 08:13

## 2018-01-22 RX ADMIN — AMLODIPINE BESYLATE 5 MG: 5 TABLET ORAL at 08:07

## 2018-01-22 RX ADMIN — CEPHALEXIN 500 MG: 500 CAPSULE ORAL at 14:30

## 2018-01-22 RX ADMIN — INSULIN LISPRO 1 UNITS: 100 INJECTION, SOLUTION INTRAVENOUS; SUBCUTANEOUS at 20:07

## 2018-01-22 RX ADMIN — CITALOPRAM 40 MG: 20 TABLET, FILM COATED ORAL at 08:07

## 2018-01-22 RX ADMIN — NITROGLYCERIN 0.4 MG: 0.4 TABLET SUBLINGUAL at 11:05

## 2018-01-22 RX ADMIN — ISOSORBIDE MONONITRATE 30 MG: 30 TABLET ORAL at 08:13

## 2018-01-22 RX ADMIN — PROMETHAZINE HYDROCHLORIDE 6.25 MG: 25 INJECTION INTRAMUSCULAR; INTRAVENOUS at 11:22

## 2018-01-22 RX ADMIN — PARICALCITOL 4 G: 1 CAPSULE ORAL at 20:08

## 2018-01-22 RX ADMIN — SODIUM CHLORIDE 2000 ML: 9 INJECTION, SOLUTION INTRAVENOUS at 16:07

## 2018-01-22 RX ADMIN — CARVEDILOL 3.12 MG: 3.12 TABLET, FILM COATED ORAL at 20:06

## 2018-01-22 RX ADMIN — INSULIN LISPRO 1 UNITS: 100 INJECTION, SOLUTION INTRAVENOUS; SUBCUTANEOUS at 16:51

## 2018-01-22 RX ADMIN — DIPHENHYDRAMINE HYDROCHLORIDE 50 MG: 50 INJECTION, SOLUTION INTRAMUSCULAR; INTRAVENOUS at 20:01

## 2018-01-22 RX ADMIN — OXYCODONE HYDROCHLORIDE AND ACETAMINOPHEN 1 TABLET: 5; 325 TABLET ORAL at 03:10

## 2018-01-22 RX ADMIN — NITROGLYCERIN 0.4 MG: 0.4 TABLET SUBLINGUAL at 11:10

## 2018-01-22 RX ADMIN — METHYLPREDNISOLONE SODIUM SUCCINATE 40 MG: 40 INJECTION, POWDER, FOR SOLUTION INTRAMUSCULAR; INTRAVENOUS at 20:54

## 2018-01-22 RX ADMIN — INSULIN LISPRO 1 UNITS: 100 INJECTION, SOLUTION INTRAVENOUS; SUBCUTANEOUS at 12:40

## 2018-01-22 RX ADMIN — OXYCODONE HYDROCHLORIDE AND ACETAMINOPHEN 1 TABLET: 5; 325 TABLET ORAL at 12:29

## 2018-01-22 RX ADMIN — CEPHALEXIN 500 MG: 500 CAPSULE ORAL at 23:39

## 2018-01-22 RX ADMIN — NITROGLYCERIN 0.4 MG: 0.4 TABLET SUBLINGUAL at 10:56

## 2018-01-22 ASSESSMENT — PAIN SCALES - GENERAL
PAINLEVEL_OUTOF10: 6

## 2018-01-22 NOTE — CONSULTS
[facility])             [address] [facility] Consult        Date of Admission:  1/20/2018  Date of Consultation:  1/22/2018      PCP:  Allison Griffith MD      Chief Complaint:non cardiac chest pain    History of Present Illness:  Jane Hobbs is a 75 y.o. female    Who has non cardiac chest pain. Her chest is exquisitely tender to touch. She has had these symptoms for quite some time. Please see my partner Dr. Amy Daniel note from 10/2017    Mild SOB, chronic COPD on home o2    No palpitations    ekg normal and enzymes negative    PMH:   has a past medical history of Allergic rhinitis; Anxiety; Arthritis; Asthma; Atrial fibrillation ([address] Back pain; CAD (coronary artery disease); Caffeine use; CHF (congestive heart failure) ([address] COPD (chronic obstructive pulmonary disease) ([address] Degeneration of lumbar or lumbosacral intervertebral disc; Depression; DM (diabetes mellitus) ([address] Emphysema of lung ([address] Gastritis; GERD (gastroesophageal reflux disease); Hearing loss; Hematuria; Hiatal hernia; HTN (hypertension), benign; Hypertriglyceridemia; Incontinence of urine; Knee arthropathy; Migraine headache; Mumps; On home oxygen therapy; HIGINIO on CPAP; Otitis media; Stroke ([address] Tinnitus; Type II or unspecified type diabetes mellitus without mention of complication, not stated as uncontrolled; UTI (lower urinary tract infection); and Varicose vein. PSH:   has a past surgical history that includes Cholecystectomy (2007); Carpal tunnel release (Right); Tympanoplasty; Dilation & curettage (1979); Cystocopy (9/25/2013); Cataract removal with implant (Bilateral); Tonsillectomy; Incontinence surgery; ablation of dysrhythmic focus (2000); Upper gastrointestinal endoscopy; eye surgery; Tubal ligation; other surgical history (09/10/15); Insertable Cardiac Monitor (12/2015); Tympanoplasty; Colonoscopy;  Colonoscopy (04/10/2017); colsc flx w/removal lesion by hot bx forceps (N/A, 4/10/2017); and Tympanostomy tube placement (08/21/2017). Allergies: Allergies   Allergen Reactions    Robitussin [Guaifenesin] Anaphylaxis    Codeine Swelling    Compazine [Prochlorperazine Maleate] Hives and Swelling    Iodides Itching    Morphine Other (See Comments)     hallucinations    Moxifloxacin      Other reaction(s): Intolerance-unknown  Other reaction(s): Intolerance-unknown    Reglan [Metoclopramide] Nausea Only    Avelox [Moxifloxacin Hcl In Nacl] Rash     Dermatitis      Cipro Xr Rash     dermatitis    Sulfa Antibiotics Rash        Home Meds:    Prior to Admission medications    Medication Sig Start Date End Date Taking? Authorizing Provider   oxyCODONE-acetaminophen (PERCOCET) 5-325 MG per tablet Take 1 tablet by mouth every 6 hours as needed for Pain (take one tablet three to four times a day as needed for lower back pain.) .  Earliest Fill Date: 12/29/17 12/29/17   VERONIKA Miles   losartan (COZAAR) 25 MG tablet TAKE 1 TAB BY MOUTH ONCE A DAY 12/4/17   Obdulio Quiroz MD   topiramate (TOPAMAX) 100 MG tablet Take 1 tablet by mouth nightly 11/29/17   Jamel Gutierrez MD   atorvastatin (LIPITOR) 80 MG tablet TAKE 1 TAB BY MOUTH ONCE A DAY 11/17/17   Anaya Hill MD   carvedilol (COREG) 3.125 MG tablet TAKE 1 TAB BY MOUTH TWICE A DAY 11/13/17   Obdulio Quiroz MD   Melatonin 10 MG TABS Take 10 mg by mouth nightly 10/19/17   Obdulio Quiroz MD   furosemide (LASIX) 20 MG tablet TAKE 1 TABLET BY MOUTH AS NEEDED (LEG SWELLING) 10/18/17   Obdulio Quiroz MD   citalopram (CELEXA) 40 MG tablet TAKE 1 TAB BY MOUTH ONCE A DAY 10/18/17   Obdulio Quiroz MD   clopidogrel (PLAVIX) 75 MG tablet TAKE 1 TAB BY MOUTH ONCE A DAY 10/18/17   Obdulio Quiroz MD   Compression Stockings MISC by Does not apply route Pressure between 20 - 25 . 9/11/17   Obdulio Quiroz MD   ranitidine (ZANTAC) 150 MG tablet  7/19/17   Historical Provider, MD   insulin glargine (LANTUS SOLOSTAR) 100 UNIT/ML injection pen Inject 35 Units into the 01/21/18 2250    amLODIPine (NORVASC) tablet 5 mg  5 mg Oral Daily Kennedy Jacobs MD   5 mg at 01/22/18 3998    atorvastatin (LIPITOR) tablet 80 mg  80 mg Oral Daily Kennedy Jacobs MD   80 mg at 01/21/18 4467    carvedilol (COREG) tablet 3.125 mg  3.125 mg Oral BID Kennedy Jacobs MD   3.125 mg at 01/21/18 8583    citalopram (CELEXA) tablet 40 mg  40 mg Oral Daily Kennedy Jacobs MD   40 mg at 01/22/18 5794    clopidogrel (PLAVIX) tablet 75 mg  75 mg Oral Daily Kennedy Jacobs MD   75 mg at 01/22/18 0813    isosorbide mononitrate (IMDUR) extended release tablet 30 mg  30 mg Oral Daily Kennedy Jacobs MD   30 mg at 01/22/18 0813    losartan (COZAAR) tablet 25 mg  25 mg Oral Daily Kennedy Jacobs MD   25 mg at 01/22/18 0813    0.9 % sodium chloride infusion   Intravenous Continuous Kennedy Jacobs  mL/hr at 01/21/18 1314         Social History:       Social History     Social History    Marital status: Single     Spouse name: N/A    Number of children: N/A    Years of education: N/A     Occupational History    disabled N/A     Social History Main Topics    Smoking status: Former Smoker     Packs/day: 3.00     Years: 41.00     Types: Cigarettes     Quit date: 1/1/2000    Smokeless tobacco: Never Used      Comment: quit in 2000    Alcohol use No    Drug use: No    Sexual activity: Not Currently     Other Topics Concern    None     Social History Narrative    None         Family Histroy:                Family History   Problem Relation Age of Onset    Diabetes Mother     Heart Disease Mother     Stomach Cancer Father     Diabetes Maternal Grandmother      also aunts and uncles (maternal)    Emphysema Sister        Review of Systems:   · Constitutional: No Fevers/Chills, No recent weight gain weight loss, No fatigue. · Eyes: No visual changes or diplopia. · ENT: No Headaches, hearing loss or vertigo.    · Cardiovascular: Per HPI  · Respiratory: chronic sob  ·   · Gastrointestinal: No Nausea, Vomiting, Diarrhea, or Constipation  · Genitourinary: No dysuria,hematuria. · Musculoskeletal:  No gait disturbance, weakness or joint complaints. · Integumentary: No rash or pruritis. · Neurological: No headache, No Hx CVA/TIA  · Psychiatric: No anxiety, or depression. · Endocrine: No temperature intolerance. · Hematologic/Lymphatic: No abnormal bruising or bleeding     Physical Exam   Vital Signs: BP (!) 109/51   Pulse 75   Temp 98 °F (36.7 °C) (Oral)   Resp 18   Ht 5' 2\" (1.575 m)   Wt 230 lb 9.6 oz (104.6 kg)   LMP  (LMP Unknown)   SpO2 98%   BMI 42.18 kg/m²  O2 Flow Rate (L/min): 2 L/min     Admission Weight: 230 lb 9.6 oz (104.6 kg)     General appearance: Alert oriented and cooperative. In no acute distress    Skin:  Warm and Dry to touch    Head: Normocephalic, without obvious abnormality, atraumatic    Eyes: Conjunctivae unremarkable, EOM's intact. Neck: No JVD, No carotid bruit. Neck supple, trachea midline    Lungs: Clear to ausculation bilaterally, no use of accessory muscles. Chest tender to touch    Heart: s1, s2,     Abdomen: Soft, non-tender. Bowel sounds normal.    Extremities: No edema    Neurologic: Oriented to time, person and place, affect appropriate. No focal/major motor defects noted. Psychiatric:  Appropriate mood, memory and judgment      Pertinent Testing:         EKG:  normal          Labs:      CBC:   Recent Labs      01/20/18 1843   WBC  5.9   HGB  12.6   HCT  39.7   MCV  94.1   PLT  166     BMP:   Recent Labs      01/20/18 1843 01/21/18   0534   NA  134*  138   K  3.9  3.7   CL  97*  100   CO2  24  26   BUN  14  17   CREATININE  1.04*  1.02*     PT/INR: No results for input(s): PROTIME, INR in the last 72 hours. APTT: No results for input(s): APTT in the last 72 hours. MAG: No results for input(s): MG in the last 72 hours.   D Dimer:   Recent Labs      01/21/18   1559   DDIMER  0.93     Troponin T   Recent Labs      01/20/18   1843 01/20/18   2240   TROPONINT  <0.03  <0.03     ProBNP No results for input(s): BNP in the last 72 hours. Diagnosis:  Principal Problem:    Atypical chest pain  Active Problems:    Type 2 diabetes mellitus with diabetic polyneuropathy, with long-term current use of insulin (HCC)    COPD (chronic obstructive pulmonary disease)    Hypoglycemia associated with type 2 diabetes mellitus (HCC)    Costochondritis    Dyspepsia      Plan:    1. Non cardiac chest pain - possible costochondritis. This is not an ACS event. From a cardiac standpoint no further work up needs to be done, she can have routine outpatient follow up. Please call as needed.      Electronically signed by Wesley Linder MD on 1/22/2018 at 12:46 PM

## 2018-01-22 NOTE — PLAN OF CARE
Problem: Pain:  Goal: Pain level will decrease  Pain level will decrease   Outcome: Ongoing  Pt able to communicate pain as needed, uses call light appropriately. Pt satisfied with pain interventions. Problem: Falls - Risk of  Goal: Absence of falls  Outcome: Ongoing  Pt remains free of falls at this time. Bed locked in lowest position, siderails x2, call light in reach. Non-skid footwear applied. Encouraged pt to call for assistance as needed for safety. Falling star posted outside of room.

## 2018-01-22 NOTE — PROGRESS NOTES
[address] [place] [address]   Department of Internal Medicine -   Internal Medicine Residency Program  ______________________________________________________________________    Patient: Mary Glover  YOB: 1950   MRN: [id number]    Acct: [de-identified]     Admit date: 2018  Today's date: 18    Admitting Diagnosis: Atypical chest pain    Subjective:   Pt seen and Chart reviewed. No acute events overnight. Off nitro drip. States she does have chest pain. States relief with topical gel. Fever:-  Temp (24hrs), Av.9 °F (36.6 °C), Min:97.5 °F (36.4 °C), Max:98.4 °F (36.9 °C)      Blood pressure:   Systolic (60KKD), JKB:382 , Min:101 , VLT:962     Diastolic (47ANW), QPF:01, Min:50, Max:95      Urine output in the last 24 hours: In: 450 [P.O.:450]  Out: 650 [Urine:650]      Review of systems. · Constitutional: Negative for fever  · Cardiovascular: Positive for chest pain. Negative for lightheadedness/orthostatic symptoms , dyspnea on exertion, palpitations or loss of consciousness. · Respiratory: Negative for cough or wheezing, sputum production, hemoptysis,   · Gastrointestinal: Negative for nausea/vomiting, change in bowel habits, abdominal pain  · Genitourinary:Negative dysuria, trouble voiding, hematuria.   · Neurological: Negative for headache, dizziness,numbness/tingling    Objective:   Vital Sign:  BP (!) 109/51   Pulse 75   Temp 98 °F (36.7 °C) (Oral)   Resp 18   Ht 5' 2\" (1.575 m)   Wt 230 lb 9.6 oz (104.6 kg)   LMP  (LMP Unknown)   SpO2 98%   BMI 42.18 kg/m²       Physical Exam:  General appearance:   alert, well appearing, and in no distress  Mental Status: alert, oriented to person, place, and time  Neurologic:  alert, oriented, normal speech, no focal findings or movement disorder noted  Lungs:  clear to auscultation, no wheezes, rales or rhonchi, symmetric air entry  Heart[de-identified] normal rate, regular rhythm, normal S1, S2, Abdomen:  soft, nontender, have pain. Trops negative. Tenderness present. 2. Costochondritis   3. D Dimer - 0.93. Will order a CT if cardiology is not planning any active intervention. 4. HTN - Coreg 3.125 mg daily. Lisinopril 2.5 mg daily. 5. Hyperlipidemia - Lipitor 40 mg.  6. Dyspepsia - Protonix 40 mg daily. 7. DM type 2 - 35 units Lantus and ISS. HbA1C - 6.6  8.  DVT prophylaxis - Heparin gtt  9. GI prophylaxis - Protonix 40 mg daily      Virginia Glez, PGY-1, Internal Medicine Resident  [facility]

## 2018-01-22 NOTE — PLAN OF CARE
Problem: Nutrition  Goal: Optimal nutrition therapy  Outcome: Ongoing  Nutrition Problem: Inadequate oral intake  Intervention: Food and/or Nutrient Delivery: Continue current diet - Provide ONS as/if needed. Nutritional Goals: Oral intakes to meet % of estimated nutrient needs.

## 2018-01-22 NOTE — PLAN OF CARE
Problem: Falls - Risk of  Goal: Absence of falls  Outcome: Met This Shift  Pt free of falls this shift. Writer assessed and educated pt on falling precautions. Pt verbalized understanding. Pt bed locked and in lowest position with non-skid footware on. Pt room kept free of clutter with adequate lighting. Pt call light within reach and calls out appropriately. Will continue to monitor with hourly rounding.

## 2018-01-23 ENCOUNTER — APPOINTMENT (OUTPATIENT)
Dept: NUCLEAR MEDICINE | Age: 68
DRG: 206 | End: 2018-01-23
Payer: COMMERCIAL

## 2018-01-23 ENCOUNTER — TELEPHONE (OUTPATIENT)
Dept: DIABETES SERVICES | Age: 68
End: 2018-01-23

## 2018-01-23 VITALS
HEART RATE: 67 BPM | SYSTOLIC BLOOD PRESSURE: 130 MMHG | BODY MASS INDEX: 42.44 KG/M2 | TEMPERATURE: 97.6 F | DIASTOLIC BLOOD PRESSURE: 63 MMHG | OXYGEN SATURATION: 98 % | RESPIRATION RATE: 17 BRPM | HEIGHT: 62 IN | WEIGHT: 230.6 LBS

## 2018-01-23 DIAGNOSIS — Z79.4 TYPE 2 DIABETES MELLITUS WITH DIABETIC POLYNEUROPATHY, WITH LONG-TERM CURRENT USE OF INSULIN (HCC): Primary | Chronic | ICD-10-CM

## 2018-01-23 DIAGNOSIS — E11.42 TYPE 2 DIABETES MELLITUS WITH DIABETIC POLYNEUROPATHY, WITH LONG-TERM CURRENT USE OF INSULIN (HCC): Primary | Chronic | ICD-10-CM

## 2018-01-23 LAB
EKG ATRIAL RATE: 75 BPM
EKG P AXIS: 70 DEGREES
EKG P-R INTERVAL: 134 MS
EKG Q-T INTERVAL: 430 MS
EKG QRS DURATION: 70 MS
EKG QTC CALCULATION (BAZETT): 480 MS
EKG R AXIS: -6 DEGREES
EKG T AXIS: 62 DEGREES
EKG VENTRICULAR RATE: 75 BPM
GLUCOSE BLD-MCNC: 192 MG/DL (ref 65–105)
GLUCOSE BLD-MCNC: 228 MG/DL (ref 65–105)
GLUCOSE BLD-MCNC: 316 MG/DL (ref 65–105)

## 2018-01-23 PROCEDURE — 99239 HOSP IP/OBS DSCHRG MGMT >30: CPT | Performed by: INTERNAL MEDICINE

## 2018-01-23 PROCEDURE — 78582 LUNG VENTILAT&PERFUS IMAGING: CPT

## 2018-01-23 PROCEDURE — 6370000000 HC RX 637 (ALT 250 FOR IP): Performed by: STUDENT IN AN ORGANIZED HEALTH CARE EDUCATION/TRAINING PROGRAM

## 2018-01-23 PROCEDURE — 3430000000 HC RX DIAGNOSTIC RADIOPHARMACEUTICAL: Performed by: STUDENT IN AN ORGANIZED HEALTH CARE EDUCATION/TRAINING PROGRAM

## 2018-01-23 PROCEDURE — G0378 HOSPITAL OBSERVATION PER HR: HCPCS

## 2018-01-23 PROCEDURE — A9538 TC99M PYROPHOSPHATE: HCPCS | Performed by: STUDENT IN AN ORGANIZED HEALTH CARE EDUCATION/TRAINING PROGRAM

## 2018-01-23 PROCEDURE — 6370000000 HC RX 637 (ALT 250 FOR IP): Performed by: EMERGENCY MEDICINE

## 2018-01-23 PROCEDURE — 82947 ASSAY GLUCOSE BLOOD QUANT: CPT

## 2018-01-23 PROCEDURE — 94762 N-INVAS EAR/PLS OXIMTRY CONT: CPT

## 2018-01-23 PROCEDURE — A9540 TC99M MAA: HCPCS | Performed by: STUDENT IN AN ORGANIZED HEALTH CARE EDUCATION/TRAINING PROGRAM

## 2018-01-23 PROCEDURE — G0108 DIAB MANAGE TRN  PER INDIV: HCPCS

## 2018-01-23 RX ORDER — SENNA PLUS 8.6 MG/1
1 TABLET ORAL NIGHTLY
Status: DISCONTINUED | OUTPATIENT
Start: 2018-01-23 | End: 2018-01-23 | Stop reason: HOSPADM

## 2018-01-23 RX ADMIN — Medication 40 MILLICURIE: at 11:00

## 2018-01-23 RX ADMIN — CEPHALEXIN 500 MG: 500 CAPSULE ORAL at 15:45

## 2018-01-23 RX ADMIN — CITALOPRAM 40 MG: 20 TABLET, FILM COATED ORAL at 08:24

## 2018-01-23 RX ADMIN — CEPHALEXIN 500 MG: 500 CAPSULE ORAL at 06:28

## 2018-01-23 RX ADMIN — PARICALCITOL 4 G: 1 CAPSULE ORAL at 09:00

## 2018-01-23 RX ADMIN — ATORVASTATIN CALCIUM 80 MG: 80 TABLET, FILM COATED ORAL at 08:24

## 2018-01-23 RX ADMIN — Medication 6 MILLICURIE: at 11:20

## 2018-01-23 RX ADMIN — ISOSORBIDE MONONITRATE 30 MG: 30 TABLET ORAL at 08:24

## 2018-01-23 RX ADMIN — PANTOPRAZOLE SODIUM 40 MG: 40 TABLET, DELAYED RELEASE ORAL at 06:28

## 2018-01-23 RX ADMIN — AMLODIPINE BESYLATE 5 MG: 5 TABLET ORAL at 08:24

## 2018-01-23 RX ADMIN — OXYCODONE HYDROCHLORIDE AND ACETAMINOPHEN 1 TABLET: 5; 325 TABLET ORAL at 08:23

## 2018-01-23 RX ADMIN — INSULIN LISPRO 1 UNITS: 100 INJECTION, SOLUTION INTRAVENOUS; SUBCUTANEOUS at 12:25

## 2018-01-23 RX ADMIN — LOSARTAN POTASSIUM 25 MG: 25 TABLET, FILM COATED ORAL at 08:24

## 2018-01-23 RX ADMIN — INSULIN LISPRO 2 UNITS: 100 INJECTION, SOLUTION INTRAVENOUS; SUBCUTANEOUS at 08:24

## 2018-01-23 RX ADMIN — CLOPIDOGREL 75 MG: 75 TABLET, FILM COATED ORAL at 08:24

## 2018-01-23 ASSESSMENT — PAIN SCALES - GENERAL
PAINLEVEL_OUTOF10: 5
PAINLEVEL_OUTOF10: 5

## 2018-01-23 NOTE — PROGRESS NOTES
Fu with pt and discussed her returning for op diabetes education. Sent referral to PCP and will schedule pt once referral received.  Naomy Perez

## 2018-01-23 NOTE — PROGRESS NOTES
Writer gave pt discharge instructions and answered any questions she had.   Electronically signed by Randolph Burton RN on 1/23/2018 at 4:54 PM

## 2018-01-23 NOTE — PROGRESS NOTES
Patient had elevated d-dimer and there was  suspicion for pulmonary embolism by primary team. STAT  CTA was ordered by primary team but patient did not have any IV access. V/Q scan ordered to rule out any pulmonary embolism. If  V/Q scan is suggestive of pulmonary embolism we will start patient on anticoagulation.         Jeanna Peralta MD  Internal Medicine [address], [facility]  PGY-1

## 2018-01-23 NOTE — CARE COORDINATION
Discharge [facility] Case Management Department  Written by: Keke Aguayo RN    Patient Name: Gregorio Weir  Attending Provider: Stephanie Lockett MD  Admit Date: 2018  5:06 PM  MRN: [id number]  Account: [de-identified]                     : 1950  Discharge Date:  18       Disposition: home with [address]  Met with patient regarding discharge. States she will need cab ride home and brought portable oxygen from home.    Notified [facility] of discharge, will process referral.     Keke Aguayo RN

## 2018-01-23 NOTE — DISCHARGE SUMMARY
[address] [place] [address]     Department of Internal Medicine - Staff Internal Medicine Service    INPATIENT DISCHARGE SUMMARY        Patient Identification:  Marty Marx is a 75 y.o. female. :  1950  MRN: [id number]     Acct: [de-identified]   Admit Date:  2018  Discharge date and time: 2018  5:00 PM   Attending Provider: Dr Sarmad Acre                                  Admission Diagnoses:   Chest pain [R07.9]  Chest pain [R07.9]    Discharge Diagnoses: Active Problems:    Type 2 diabetes mellitus with diabetic polyneuropathy, with long-term current use of insulin (HCC)    COPD (chronic obstructive pulmonary disease)    Hypoglycemia    Costochondritis    Dyspepsia       Consults:   cardiology    Brief Inpatient course:  75year old female past medical history of CKD, DM, CAD on ASA and plavix presented to ED with chest pain. Patient states chest pain was sternal and tender with palpation and reproducible. Patient was seen by cardiology and was ruled as musculoskeletal in nature. Patient underwent V/Q scan for slightly elevated D-Dimer and was found to be low-intermediate risk. Patient continued to have sinus rhythm with no respiratory depression. Patient was discharged and recommended to follow up with PCP as outpatient and to continue over the counter medication for Costochondritis and if it doesn't improve to see PCP. Procedures:    V-Q scan:  Low - intermediate probability for acute PE.     Any Hospital Acquired Infections: none    Discharge Functional Status:  stable    Disposition: home    Patient Instructions:   Current Discharge Medication List      START taking these medications    Details   cephALEXin (KEFLEX) 500 MG capsule Take 1 capsule by mouth every 8 hours for 4 days  Qty: 12 capsule, Refills: 0         CONTINUE these medications which have NOT CHANGED    Details   losartan (COZAAR) 25 MG tablet TAKE 1 TAB BY MOUTH ONCE A DAY  Qty: 90 tablet, Refills: 5      topiramate MG extended release tablet TAKE 1 TABLET BY MOUTH DAILY  Qty: 30 tablet, Refills: 5      Elastic Bandages & Supports (ANKLE BRACE/HIGH PERFORMANCE L) MISC Needs right ankle brace. Diagnosis: right ankle injury  Qty: 1 each, Refills: 0      ferrous sulfate 325 (65 FE) MG tablet Take 325 mg by mouth daily (with breakfast)      !! ULTICARE SHORT PEN NEEDLES 31G X 8 MM MISC AS DIRECTED  Qty: 100 each, Refills: 5      docusate sodium (COLACE) 100 MG capsule Take 1 capsule by mouth 2 times daily Please use while taking Percocet  Qty: 30 capsule, Refills: 0      SYMBICORT 160-4.5 MCG/ACT AERO   2 puffs As needed for sob      ipratropium (ATROVENT) 0.02 % nebulizer solution       magnesium oxide (MAG-OX) 400 MG tablet Take 400 mg by mouth 2 times daily. insulin regular (HUMULIN R;NOVOLIN R) 100 UNIT/ML injection   Inject 10 Units into the skin See Admin Instructions Indications: 10 units daily @ 6 pm Per sliding scale      OXYGEN Inhale 2 L into the lungs. nystatin (MYCOSTATIN) powder Apply 3 times daily. Qty: 3 Bottle, Refills: 3    Associated Diagnoses: Perineal irritation in female      albuterol (PROVENTIL) (2.5 MG/3ML) 0.083% nebulizer solution Take 2.5 mg by nebulization every 6 hours as needed. !! - Potential duplicate medications found. Please discuss with provider.       STOP taking these medications       oxyCODONE-acetaminophen (PERCOCET) 5-325 MG per tablet Comments:   Reason for Stopping:         metFORMIN (GLUCOPHAGE) 1000 MG tablet Comments:   Reason for Stopping:               Activity: activity as tolerated    Diet: cardiac diet    Follow-up:    Renetta Graham MD  [address] [address]  [place] [address] [phone]    In 1 week  Follow up    [facility] [address]  [address] [place] [address] [phone]          Follow up labs: N/A    Follow up imaging: N/A    Note that over 30 minutes was spent in preparing discharge papers, discussing discharge with patient, medication review, etc.      Pierre Ospina MD         Department of Internal Medicine  [facility] [place]         2/15/2018, 4:21 PM

## 2018-01-23 NOTE — PROGRESS NOTES
[address] [place] [address]   Department of Internal Medicine -   Internal Medicine Residency Program  ______________________________________________________________________    Patient: Erick Jimenez  YOB: 1950   MRN: [id number]    Acct: [de-identified]     Admit date: 2018  Today's date: 18    Admitting Diagnosis: Atypical chest pain    Subjective:   Pt seen and Chart reviewed. No acute events overnight. Patient reports her chest pain has improved quite a bit. States that the topical gel has been helping her. Cardiology saw patient yesterday and believes this to be non cardiac chest pain. No further workup necessary from the cardiac standpoint. Fever:-  Temp (24hrs), Av °F (36.7 °C), Min:97.6 °F (36.4 °C), Max:98.2 °F (36.8 °C)    Blood pressure:   Systolic (64TAR), ASE:061 , Min:110 , CHK:306     Diastolic (60ZSM), FSP:29, Min:50, Max:62    Urine output in the last 24 hours: In: 2400 [P.O.:1100; I.V.:1300]  Out: 2250 [Urine:2250]      Review of systems. · Constitutional: Negative for fever  · Cardiovascular: Negative for lightheadedness/orthostatic symptoms, dyspnea on exertion, palpitations or loss of consciousness  · Respiratory: Negative for cough or wheezing, sputum production, hemoptysis   · Gastrointestinal: Negative for nausea/vomiting, change in bowel habits, abdominal pain  · Genitourinary:Negative dysuria, trouble voiding, hematuria.   · Neurological: Negative for headache, dizziness,numbness/tingling    Objective:   Vital Sign:  /62   Pulse 67   Temp 97.6 °F (36.4 °C) (Axillary)   Resp 15   Ht 5' 2\" (1.575 m)   Wt 230 lb 9.6 oz (104.6 kg)   LMP  (LMP Unknown)   SpO2 97%   BMI 42.18 kg/m²       Physical Exam:  General appearance:   alert, well appearing, and in no distress  Mental Status: alert, oriented to person, place, and time  Neurologic:  alert, oriented, normal speech, no focal findings or movement disorder noted  Lungs:  clear to chest pain  Active Problems:    Type 2 diabetes mellitus with diabetic polyneuropathy, with long-term current use of insulin (HCC)    COPD (chronic obstructive pulmonary disease)    Hypoglycemia associated with type 2 diabetes mellitus (HCC)    Costochondritis    Dyspepsia    1. Atypical chest pain - Cardiology does not feel this is cardiac in nature. No further cardiac workup necessary. OK to follow up with Cardiology outpatient. 2. Costochondritis - Tenderness improves with use of topical diclofenac gel. Continue PRN. 3. D dimer - 0.93. Poor IV access, VQ scan to be completed this afternoon. 4. HTN - Coreg 3.125mg daily. Lisinopril 2.5mg daily. 5. Hyperlipidemia - Lipitor 40mg daily. 6. Dyspepsia - Protinix 40mg daily. 7. DM2 - 35u Lantus and ISS. Last HbA1C 6.6.  8. DVT ppx - Heparin gtt. 9. GI ppx - Protonix 40mg daily. Caryle Loa, PGY-1, Internal Medicine Resident  [facility]    Attending Physician Statement  I have discussed the care of Braulio Amezquita, including pertinent history and exam findings with the resident. I have reviewed the key elements of all parts of the encounter with the resident. I have seen and examined the patient with the resident and the key elements of all parts of the encounter have been performed by me.

## 2018-01-24 ENCOUNTER — TELEPHONE (OUTPATIENT)
Dept: FAMILY MEDICINE CLINIC | Age: 68
End: 2018-01-24

## 2018-01-25 ENCOUNTER — TELEPHONE (OUTPATIENT)
Dept: FAMILY MEDICINE CLINIC | Age: 68
End: 2018-01-25

## 2018-01-25 ENCOUNTER — HOSPITAL ENCOUNTER (OUTPATIENT)
Dept: PAIN MANAGEMENT | Age: 68
Discharge: HOME | End: 2018-01-25
Payer: MEDICARE

## 2018-01-25 VITALS
RESPIRATION RATE: 16 BRPM | SYSTOLIC BLOOD PRESSURE: 125 MMHG | TEMPERATURE: 97.8 F | HEIGHT: 62 IN | HEART RATE: 66 BPM | DIASTOLIC BLOOD PRESSURE: 62 MMHG | BODY MASS INDEX: 42.33 KG/M2 | OXYGEN SATURATION: 94 % | WEIGHT: 230 LBS

## 2018-01-25 DIAGNOSIS — G89.29 ENCOUNTER FOR CHRONIC PAIN MANAGEMENT: ICD-10-CM

## 2018-01-25 DIAGNOSIS — M50.30 DDD (DEGENERATIVE DISC DISEASE), CERVICAL: Primary | ICD-10-CM

## 2018-01-25 DIAGNOSIS — M54.81 BILATERAL OCCIPITAL NEURALGIA: ICD-10-CM

## 2018-01-25 DIAGNOSIS — M54.16 LUMBAR RADICULOPATHY, CHRONIC: ICD-10-CM

## 2018-01-25 DIAGNOSIS — G62.9 NEUROPATHY: ICD-10-CM

## 2018-01-25 DIAGNOSIS — M46.1 SACROILIITIS, NOT ELSEWHERE CLASSIFIED (HCC): ICD-10-CM

## 2018-01-25 DIAGNOSIS — M47.812 OSTEOARTHRITIS OF CERVICAL SPINE, UNSPECIFIED SPINAL OSTEOARTHRITIS COMPLICATION STATUS: ICD-10-CM

## 2018-01-25 DIAGNOSIS — G43.709 CHRONIC MIGRAINE WITHOUT AURA WITHOUT STATUS MIGRAINOSUS, NOT INTRACTABLE: ICD-10-CM

## 2018-01-25 DIAGNOSIS — F11.90 CHRONIC, CONTINUOUS USE OF OPIOIDS: ICD-10-CM

## 2018-01-25 DIAGNOSIS — M51.36 DDD (DEGENERATIVE DISC DISEASE), LUMBAR: ICD-10-CM

## 2018-01-25 DIAGNOSIS — M50.30 DEGENERATIVE CERVICAL DISC: ICD-10-CM

## 2018-01-25 DIAGNOSIS — E66.01 OBESITY, MORBID, BMI 40.0-49.9 (HCC): ICD-10-CM

## 2018-01-25 DIAGNOSIS — R07.89 ATYPICAL CHEST PAIN: ICD-10-CM

## 2018-01-25 DIAGNOSIS — Z79.4 TYPE 2 DIABETES MELLITUS WITH DIABETIC POLYNEUROPATHY, WITH LONG-TERM CURRENT USE OF INSULIN (HCC): Primary | Chronic | ICD-10-CM

## 2018-01-25 DIAGNOSIS — Z51.81 ENCOUNTER FOR MEDICATION MONITORING: ICD-10-CM

## 2018-01-25 DIAGNOSIS — G43.009 NONINTRACTABLE MIGRAINE, UNSPECIFIED MIGRAINE TYPE: ICD-10-CM

## 2018-01-25 DIAGNOSIS — M47.817 LUMBOSACRAL SPONDYLOSIS WITHOUT MYELOPATHY: ICD-10-CM

## 2018-01-25 DIAGNOSIS — J44.9 CHRONIC OBSTRUCTIVE PULMONARY DISEASE, UNSPECIFIED COPD TYPE (HCC): ICD-10-CM

## 2018-01-25 DIAGNOSIS — G43.119 INTRACTABLE MIGRAINE WITH AURA WITHOUT STATUS MIGRAINOSUS: ICD-10-CM

## 2018-01-25 DIAGNOSIS — E11.42 TYPE 2 DIABETES MELLITUS WITH DIABETIC POLYNEUROPATHY, WITH LONG-TERM CURRENT USE OF INSULIN (HCC): Primary | Chronic | ICD-10-CM

## 2018-01-25 DIAGNOSIS — G89.29 CHRONIC PAIN OF LEFT KNEE: ICD-10-CM

## 2018-01-25 DIAGNOSIS — E16.2 HYPOGLYCEMIA: ICD-10-CM

## 2018-01-25 DIAGNOSIS — M25.562 CHRONIC PAIN OF LEFT KNEE: ICD-10-CM

## 2018-01-25 DIAGNOSIS — M51.37 DEGENERATION OF LUMBAR OR LUMBOSACRAL INTERVERTEBRAL DISC: ICD-10-CM

## 2018-01-25 PROCEDURE — 99999 PR MD CERTIFICATION HHA PATIENT: CPT | Performed by: FAMILY MEDICINE

## 2018-01-25 PROCEDURE — 99213 OFFICE O/P EST LOW 20 MIN: CPT | Performed by: NURSE PRACTITIONER

## 2018-01-25 PROCEDURE — 99213 OFFICE O/P EST LOW 20 MIN: CPT

## 2018-01-25 RX ORDER — OXYCODONE HYDROCHLORIDE AND ACETAMINOPHEN 5; 325 MG/1; MG/1
1 TABLET ORAL EVERY 6 HOURS PRN
Qty: 100 TABLET | Refills: 0 | Status: SHIPPED | OUTPATIENT
Start: 2018-01-28 | End: 2018-03-05 | Stop reason: SDUPTHER

## 2018-01-25 ASSESSMENT — ENCOUNTER SYMPTOMS
BLURRED VISION: 1
NAUSEA: 1
SHORTNESS OF BREATH: 1
DIARRHEA: 1
BACK PAIN: 1

## 2018-01-25 NOTE — PROGRESS NOTES
IMPLANT Bilateral     CHOLECYSTECTOMY  2007    COLONOSCOPY      COLONOSCOPY  04/10/2017    tubular adenomo polyp , mod. sigmoid diverticulosis, min. int. hemorrhoids    CYSTOSCOPY  9/25/2013    DILATION AND CURETTAGE  1979    EYE SURGERY      laser    INCONTINENCE SURGERY      Percutaneous nerve stimulation Dr Briana Hudson Nov 2013     INSERTABLE CARDIAC MONITOR  12/2015    OTHER SURGICAL HISTORY  09/10/15    removal of interstim    NE COLSC FLX W/REMOVAL LESION BY HOT BX FORCEPS N/A 4/10/2017    COLONOSCOPY POLYPECTOMY HOT BIOPSY performed by Lois Cordero MD at [facility]      TYMPANOPLASTY      TYMPANOPLASTY      TYMPANOSTOMY TUBE PLACEMENT  08/21/2017    UPPER GASTROINTESTINAL ENDOSCOPY         Allergies   Allergen Reactions    Robitussin [Guaifenesin] Anaphylaxis    Codeine Swelling    Compazine [Prochlorperazine Maleate] Hives and Swelling    Iodides Itching    Morphine Other (See Comments)     hallucinations    Moxifloxacin      Other reaction(s): Intolerance-unknown  Other reaction(s): Intolerance-unknown    Reglan [Metoclopramide] Nausea Only    Avelox [Moxifloxacin Hcl In Nacl] Rash     Dermatitis      Cipro Xr Rash     dermatitis    Sulfa Antibiotics Rash         Current Outpatient Prescriptions:     [START ON 1/28/2018] oxyCODONE-acetaminophen (PERCOCET) 5-325 MG per tablet, Take 1 tablet by mouth every 6 hours as needed for Pain (take one tablet three to four times a day as needed for lower back pain.) for up to 30 days.  Earliest Fill Date: 1/28/18, Disp: 100 tablet, Rfl: 0    cephALEXin (KEFLEX) 500 MG capsule, Take 1 capsule by mouth every 8 hours for 4 days, Disp: 12 capsule, Rfl: 0    losartan (COZAAR) 25 MG tablet, TAKE 1 TAB BY MOUTH ONCE A DAY, Disp: 90 tablet, Rfl: 5    topiramate (TOPAMAX) 100 MG tablet, Take 1 tablet by mouth nightly, Disp: 90 tablet, Rfl: 1    atorvastatin (LIPITOR) 80 MG tablet, TAKE 1 TAB BY MOUTH ONCE A DAY, Disp: 90 tablet, Rfl: 3    carvedilol (COREG) 3.125 MG tablet, TAKE 1 TAB BY MOUTH TWICE A DAY, Disp: 60 tablet, Rfl: 3    citalopram (CELEXA) 40 MG tablet, TAKE 1 TAB BY MOUTH ONCE A DAY, Disp: 90 tablet, Rfl: 3    clopidogrel (PLAVIX) 75 MG tablet, TAKE 1 TAB BY MOUTH ONCE A DAY, Disp: 90 tablet, Rfl: 3    ranitidine (ZANTAC) 150 MG tablet, , Disp: , Rfl:     insulin glargine (LANTUS SOLOSTAR) 100 UNIT/ML injection pen, Inject 35 Units into the skin nightly, Disp: 5 Pen, Rfl: 3    insulin glargine (LANTUS SOLOSTAR) 100 UNIT/ML injection pen, Inject 45 Units into the skin every morning, Disp: 5 Pen, Rfl: 3    amLODIPine (NORVASC) 5 MG tablet, TAKE 1 TAB BY MOUTH ONCE A DAY, Disp: 90 tablet, Rfl: 0    pantoprazole (PROTONIX) 40 MG tablet, Take 1 tablet by mouth 2 times daily, Disp: 180 tablet, Rfl: 3    isosorbide mononitrate (IMDUR) 30 MG extended release tablet, TAKE 1 TABLET BY MOUTH DAILY, Disp: 30 tablet, Rfl: 5    ferrous sulfate 325 (65 FE) MG tablet, Take 325 mg by mouth daily (with breakfast), Disp: , Rfl:     docusate sodium (COLACE) 100 MG capsule, Take 1 capsule by mouth 2 times daily Please use while taking Percocet, Disp: 30 capsule, Rfl: 0    SYMBICORT 160-4.5 MCG/ACT AERO,  2 puffs As needed for sob, Disp: , Rfl:     magnesium oxide (MAG-OX) 400 MG tablet, Take 400 mg by mouth 2 times daily. , Disp: , Rfl:     OXYGEN, Inhale 2 L into the lungs. , Disp: , Rfl:     Melatonin 10 MG TABS, Take 10 mg by mouth nightly, Disp: 90 tablet, Rfl: 3    furosemide (LASIX) 20 MG tablet, TAKE 1 TABLET BY MOUTH AS NEEDED (LEG SWELLING), Disp: 30 tablet, Rfl: 3    Compression Stockings MISC, by Does not apply route Pressure between 20 - 25 ., Disp: 1 each, Rfl: 0    diphenhydrAMINE (BENADRYL) 25 MG capsule, TAKE 1 TABLET BY MOUTH NIGHTLY AS NEEDED (INSOMNIA), Disp: 30 capsule, Rfl: 0    Insulin Pen Needle 31G X 6 MM MISC, 1 each by Does not apply route daily, Disp: 100 each, Rfl: 3    Elastic Bandages & Psychiatric/Behavioral: Positive for depression. Managing         Physical Exam:  /62   Pulse 66   Temp 97.8 °F (36.6 °C) (Oral)   Resp 16   Ht 5' 2\" (1.575 m)   Wt 230 lb (104.3 kg)   LMP  (LMP Unknown)   SpO2 94%   BMI 42.07 kg/m²     Physical Exam   Constitutional: She is well-developed, well-nourished, and in no distress. obese   HENT:   Head: Normocephalic. Neck: Normal range of motion. Neck supple. Cardiovascular:   Lower leg edeam 1+   Pulmonary/Chest: Effort normal.   Portable oxygen   Musculoskeletal:        Lumbar back: She exhibits decreased range of motion. Arms:  Neurological:   Reflex Scores:       Patellar reflexes are 1+ on the right side and 1+ on the left side. Achilles reflexes are 1+ on the right side and 1+ on the left side. Skin: Skin is warm, dry and intact. Psychiatric: Affect and judgment normal.       Record/Diagnostics Review:    As above, I did review the imaging  10/1/2017  4:02 PM - Justino Rhodes Incoming Lab Results From [facility]     Component Results     Component Value Ref Range & Units Status Collected Lab   Pain Management Drug Panel Interp, Urine Consistent   Final 09/26/2017 11:30 AM [facility]   (NOTE)   ________________________________________________________________   DRUGS EXPECTED:   OXYCODONE [9/25/17]   ________________________________________________________________   CONSISTENT with medications provided:   OXYCODONE : based on oxycodone, noroxycodone   ________________________________________________________________   INTERPRETIVE INFORMATION: Pain Mgt [place], [place] Spec/EMIT, Ur,                            Interp   Interpretation depends on accuracy and completeness of patient   medication information submitted by client.     6-Acetylmorphine, Ur Not Detected   Final 09/26/2017 11:30 AM [address] [place], [place] [address] Rd Lab   7-Aminoclonazepam, Urine Not Detected   Final 09/26/2017 11:30 AM [facility] Alpha-OH-Alpraz, Urine Not Detected   Final 09/26/2017 11:30  [facility]   Alprazolam, Urine Not Detected   Final 09/26/2017 11:30 AM [facility]   Amphetamines, urine Not Detected   Final 09/26/2017 11:30  [address] [facility]   Barbiturates, Ur Not Detected   Final 09/26/2017 11:30 AM [facility]   [place], Ur Not Detected   Final 09/26/2017 11:30 AM [facility]   Buprenorphine Urine Not Detected   Final 09/26/2017 11:30 AM [facility]   Carisoprodol, Ur Not Detected   Final 09/26/2017 11:30 AM [facility]   (NOTE)   The carisoprodol immunoassay has cross-reactivity to carisoprodol   and meprobamate.     Clonazepam, Urine Not Detected   Final 09/26/2017 11:30  [facility] Lab   Codeine, Urine Not Detected   Final 09/26/2017 11:30 AM [facility]   MDA, Ur Not Detected   Final 09/26/2017 11:30  [facility] Lab   Diazepam, Urine Not Detected   Final 09/26/2017 11:30 AM [facility]   Ethyl Glucuronide Ur Not Detected   Final 09/26/2017 11:30  [facility] Lab   Fentanyl, Ur Not Detected   Final 09/26/2017 11:30 AM [facility]   Hydrocodone, Urine Not Detected   Final 09/26/2017 11:30  [facility] Lab   Hydromorphone, Urine Not Detected   Final 09/26/2017 11:30  [facility]   Lorazepam, Urine Not Detected   Final 09/26/2017 11:30  [address] [facility]   Marijuana Metab, Ur Not Detected   Final 09/26/2017 11:30  [address] [facility]   MDEA, ALMA, Ur Not Detected   Final 09/26/2017 11:30  [address] [facility]   MDMA URINE Not Detected   Final 09/26/2017 11:30  [address] [facility]   Meperidine Metab, Ur Not Detected   Final 09/26/2017 11:30  [address] [facility]   Methadone, Urine Not Detected   Final 09/26/2017 11:30 AM [facility]

## 2018-01-31 ENCOUNTER — APPOINTMENT (OUTPATIENT)
Dept: GENERAL RADIOLOGY | Age: 68
DRG: 189 | End: 2018-01-31
Payer: COMMERCIAL

## 2018-01-31 ENCOUNTER — HOSPITAL ENCOUNTER (INPATIENT)
Age: 68
LOS: 6 days | Discharge: HOME HEALTH | DRG: 189 | End: 2018-02-06
Attending: EMERGENCY MEDICINE | Admitting: INTERNAL MEDICINE
Payer: COMMERCIAL

## 2018-01-31 DIAGNOSIS — M50.30 DDD (DEGENERATIVE DISC DISEASE), CERVICAL: ICD-10-CM

## 2018-01-31 DIAGNOSIS — K21.00 GASTROESOPHAGEAL REFLUX DISEASE WITH ESOPHAGITIS: ICD-10-CM

## 2018-01-31 DIAGNOSIS — E11.42 TYPE 2 DIABETES MELLITUS WITH DIABETIC POLYNEUROPATHY, WITH LONG-TERM CURRENT USE OF INSULIN (HCC): Chronic | ICD-10-CM

## 2018-01-31 DIAGNOSIS — M51.36 DDD (DEGENERATIVE DISC DISEASE), LUMBAR: ICD-10-CM

## 2018-01-31 DIAGNOSIS — M54.81 BILATERAL OCCIPITAL NEURALGIA: ICD-10-CM

## 2018-01-31 DIAGNOSIS — Z79.4 TYPE 2 DIABETES MELLITUS WITH DIABETIC POLYNEUROPATHY, WITH LONG-TERM CURRENT USE OF INSULIN (HCC): Chronic | ICD-10-CM

## 2018-01-31 DIAGNOSIS — J44.1 COPD EXACERBATION (HCC): Primary | ICD-10-CM

## 2018-01-31 LAB
ABSOLUTE EOS #: 0.1 K/UL (ref 0–0.4)
ABSOLUTE IMMATURE GRANULOCYTE: ABNORMAL K/UL (ref 0–0.3)
ABSOLUTE LYMPH #: 1.5 K/UL (ref 1–4.8)
ABSOLUTE MONO #: 1 K/UL (ref 0.1–1.3)
ANION GAP SERPL CALCULATED.3IONS-SCNC: 12 MMOL/L (ref 9–17)
BASOPHILS # BLD: 0 % (ref 0–2)
BASOPHILS ABSOLUTE: 0 K/UL (ref 0–0.2)
BNP INTERPRETATION: ABNORMAL
BUN BLDV-MCNC: 18 MG/DL (ref 8–23)
BUN/CREAT BLD: ABNORMAL (ref 9–20)
CALCIUM SERPL-MCNC: 8.7 MG/DL (ref 8.6–10.4)
CHLORIDE BLD-SCNC: 100 MMOL/L (ref 98–107)
CO2: 28 MMOL/L (ref 20–31)
CREAT SERPL-MCNC: 1.12 MG/DL (ref 0.5–0.9)
DIFFERENTIAL TYPE: ABNORMAL
EOSINOPHILS RELATIVE PERCENT: 1 % (ref 0–4)
GFR AFRICAN AMERICAN: 59 ML/MIN
GFR NON-AFRICAN AMERICAN: 48 ML/MIN
GFR SERPL CREATININE-BSD FRML MDRD: ABNORMAL ML/MIN/{1.73_M2}
GFR SERPL CREATININE-BSD FRML MDRD: ABNORMAL ML/MIN/{1.73_M2}
GLUCOSE BLD-MCNC: 70 MG/DL (ref 70–99)
HCT VFR BLD CALC: 35.9 % (ref 36–46)
HEMOGLOBIN: 11.7 G/DL (ref 12–16)
IMMATURE GRANULOCYTES: ABNORMAL %
LYMPHOCYTES # BLD: 12 % (ref 24–44)
MCH RBC QN AUTO: 30.3 PG (ref 26–34)
MCHC RBC AUTO-ENTMCNC: 32.6 G/DL (ref 31–37)
MCV RBC AUTO: 92.8 FL (ref 80–100)
MONOCYTES # BLD: 8 % (ref 1–7)
NRBC AUTOMATED: ABNORMAL PER 100 WBC
PDW BLD-RTO: 13.8 % (ref 11.5–14.9)
PLATELET # BLD: 236 K/UL (ref 150–450)
PLATELET ESTIMATE: ABNORMAL
PMV BLD AUTO: 8.9 FL (ref 6–12)
POTASSIUM SERPL-SCNC: 4 MMOL/L (ref 3.7–5.3)
PRO-BNP: 304 PG/ML
RBC # BLD: 3.87 M/UL (ref 4–5.2)
RBC # BLD: ABNORMAL 10*6/UL
SEG NEUTROPHILS: 79 % (ref 36–66)
SEGMENTED NEUTROPHILS ABSOLUTE COUNT: 9.9 K/UL (ref 1.3–9.1)
SODIUM BLD-SCNC: 140 MMOL/L (ref 135–144)
WBC # BLD: 12.5 K/UL (ref 3.5–11)
WBC # BLD: ABNORMAL 10*3/UL

## 2018-01-31 PROCEDURE — 83880 ASSAY OF NATRIURETIC PEPTIDE: CPT

## 2018-01-31 PROCEDURE — 94762 N-INVAS EAR/PLS OXIMTRY CONT: CPT

## 2018-01-31 PROCEDURE — 80048 BASIC METABOLIC PNL TOTAL CA: CPT

## 2018-01-31 PROCEDURE — 82800 BLOOD PH: CPT

## 2018-01-31 PROCEDURE — 94640 AIRWAY INHALATION TREATMENT: CPT

## 2018-01-31 PROCEDURE — 2060000000 HC ICU INTERMEDIATE R&B

## 2018-01-31 PROCEDURE — 85025 COMPLETE CBC W/AUTO DIFF WBC: CPT

## 2018-01-31 PROCEDURE — 6360000002 HC RX W HCPCS: Performed by: EMERGENCY MEDICINE

## 2018-01-31 PROCEDURE — 94664 DEMO&/EVAL PT USE INHALER: CPT

## 2018-01-31 PROCEDURE — 82805 BLOOD GASES W/O2 SATURATION: CPT

## 2018-01-31 PROCEDURE — 96374 THER/PROPH/DIAG INJ IV PUSH: CPT

## 2018-01-31 PROCEDURE — 99285 EMERGENCY DEPT VISIT HI MDM: CPT

## 2018-01-31 PROCEDURE — 36415 COLL VENOUS BLD VENIPUNCTURE: CPT

## 2018-01-31 PROCEDURE — 71046 X-RAY EXAM CHEST 2 VIEWS: CPT

## 2018-01-31 RX ORDER — ALBUTEROL SULFATE 2.5 MG/3ML
2.5 SOLUTION RESPIRATORY (INHALATION) EVERY 6 HOURS PRN
Status: DISCONTINUED | OUTPATIENT
Start: 2018-01-31 | End: 2018-02-01

## 2018-01-31 RX ORDER — METHYLPREDNISOLONE SODIUM SUCCINATE 125 MG/2ML
125 INJECTION, POWDER, LYOPHILIZED, FOR SOLUTION INTRAMUSCULAR; INTRAVENOUS ONCE
Status: COMPLETED | OUTPATIENT
Start: 2018-01-31 | End: 2018-01-31

## 2018-01-31 RX ORDER — KETOROLAC TROMETHAMINE 30 MG/ML
30 INJECTION, SOLUTION INTRAMUSCULAR; INTRAVENOUS ONCE
Status: COMPLETED | OUTPATIENT
Start: 2018-01-31 | End: 2018-02-01

## 2018-01-31 RX ADMIN — ALBUTEROL SULFATE 2.5 MG: 2.5 SOLUTION RESPIRATORY (INHALATION) at 22:10

## 2018-01-31 RX ADMIN — METHYLPREDNISOLONE SODIUM SUCCINATE 125 MG: 125 INJECTION, POWDER, FOR SOLUTION INTRAMUSCULAR; INTRAVENOUS at 21:28

## 2018-01-31 RX ADMIN — ALBUTEROL SULFATE 2.5 MG: 2.5 SOLUTION RESPIRATORY (INHALATION) at 23:52

## 2018-01-31 RX ADMIN — ALBUTEROL SULFATE 2.5 MG: 2.5 SOLUTION RESPIRATORY (INHALATION) at 21:21

## 2018-01-31 ASSESSMENT — ENCOUNTER SYMPTOMS
FACIAL SWELLING: 0
TROUBLE SWALLOWING: 0
BACK PAIN: 0
CHEST TIGHTNESS: 0
RHINORRHEA: 0
SORE THROAT: 0
CONSTIPATION: 0
BLOOD IN STOOL: 0
COLOR CHANGE: 0
SINUS PRESSURE: 0
ABDOMINAL PAIN: 0
SHORTNESS OF BREATH: 1
EYE PAIN: 0
NAUSEA: 0
COUGH: 1
VOMITING: 0
WHEEZING: 0
EYE DISCHARGE: 0
DIARRHEA: 0
EYE REDNESS: 0

## 2018-01-31 ASSESSMENT — PAIN SCALES - GENERAL: PAINLEVEL_OUTOF10: 8

## 2018-02-01 PROBLEM — Z99.89 OSA ON CPAP: Chronic | Status: ACTIVE | Noted: 2018-02-01

## 2018-02-01 PROBLEM — G47.33 OSA ON CPAP: Chronic | Status: ACTIVE | Noted: 2018-02-01

## 2018-02-01 PROBLEM — N17.9 AKI (ACUTE KIDNEY INJURY) (HCC): Status: ACTIVE | Noted: 2018-02-01

## 2018-02-01 LAB
ANION GAP SERPL CALCULATED.3IONS-SCNC: 15 MMOL/L (ref 9–17)
BUN BLDV-MCNC: 26 MG/DL (ref 8–23)
BUN/CREAT BLD: ABNORMAL (ref 9–20)
CALCIUM SERPL-MCNC: 8.5 MG/DL (ref 8.6–10.4)
CHLORIDE BLD-SCNC: 98 MMOL/L (ref 98–107)
CO2: 23 MMOL/L (ref 20–31)
CREAT SERPL-MCNC: 1.28 MG/DL (ref 0.5–0.9)
DIRECT EXAM: NORMAL
GFR AFRICAN AMERICAN: 50 ML/MIN
GFR NON-AFRICAN AMERICAN: 41 ML/MIN
GFR SERPL CREATININE-BSD FRML MDRD: ABNORMAL ML/MIN/{1.73_M2}
GFR SERPL CREATININE-BSD FRML MDRD: ABNORMAL ML/MIN/{1.73_M2}
GLUCOSE BLD-MCNC: 216 MG/DL (ref 65–105)
GLUCOSE BLD-MCNC: 316 MG/DL (ref 65–105)
GLUCOSE BLD-MCNC: 348 MG/DL (ref 70–99)
GLUCOSE BLD-MCNC: 354 MG/DL (ref 65–105)
GLUCOSE BLD-MCNC: 416 MG/DL (ref 65–105)
GLUCOSE BLD-MCNC: 432 MG/DL (ref 65–105)
GLUCOSE BLD-MCNC: 456 MG/DL (ref 65–105)
GLUCOSE BLD-MCNC: 485 MG/DL (ref 65–105)
GLUCOSE BLD-MCNC: 69 MG/DL (ref 65–105)
HCT VFR BLD CALC: 34 % (ref 36–46)
HEMOGLOBIN: 11 G/DL (ref 12–16)
Lab: NORMAL
MCH RBC QN AUTO: 30.8 PG (ref 26–34)
MCHC RBC AUTO-ENTMCNC: 32.3 G/DL (ref 31–37)
MCV RBC AUTO: 95.4 FL (ref 80–100)
NRBC AUTOMATED: ABNORMAL PER 100 WBC
PDW BLD-RTO: 13.9 % (ref 11.5–14.9)
PLATELET # BLD: 234 K/UL (ref 150–450)
PMV BLD AUTO: 10.2 FL (ref 6–12)
POTASSIUM SERPL-SCNC: 4.6 MMOL/L (ref 3.7–5.3)
RBC # BLD: 3.56 M/UL (ref 4–5.2)
SODIUM BLD-SCNC: 136 MMOL/L (ref 135–144)
SPECIMEN DESCRIPTION: NORMAL
SPECIMEN DESCRIPTION: NORMAL
STATUS: NORMAL
WBC # BLD: 14.9 K/UL (ref 3.5–11)

## 2018-02-01 PROCEDURE — 82947 ASSAY GLUCOSE BLOOD QUANT: CPT

## 2018-02-01 PROCEDURE — 2500000003 HC RX 250 WO HCPCS: Performed by: NURSE PRACTITIONER

## 2018-02-01 PROCEDURE — 6360000002 HC RX W HCPCS: Performed by: INTERNAL MEDICINE

## 2018-02-01 PROCEDURE — 80048 BASIC METABOLIC PNL TOTAL CA: CPT

## 2018-02-01 PROCEDURE — 6370000000 HC RX 637 (ALT 250 FOR IP): Performed by: INTERNAL MEDICINE

## 2018-02-01 PROCEDURE — 87804 INFLUENZA ASSAY W/OPTIC: CPT

## 2018-02-01 PROCEDURE — 85027 COMPLETE CBC AUTOMATED: CPT

## 2018-02-01 PROCEDURE — 99223 1ST HOSP IP/OBS HIGH 75: CPT | Performed by: INTERNAL MEDICINE

## 2018-02-01 PROCEDURE — 6370000000 HC RX 637 (ALT 250 FOR IP): Performed by: NURSE PRACTITIONER

## 2018-02-01 PROCEDURE — 6360000002 HC RX W HCPCS: Performed by: EMERGENCY MEDICINE

## 2018-02-01 PROCEDURE — 2060000000 HC ICU INTERMEDIATE R&B

## 2018-02-01 PROCEDURE — 93970 EXTREMITY STUDY: CPT

## 2018-02-01 PROCEDURE — 94640 AIRWAY INHALATION TREATMENT: CPT

## 2018-02-01 PROCEDURE — 94761 N-INVAS EAR/PLS OXIMETRY MLT: CPT

## 2018-02-01 PROCEDURE — 94664 DEMO&/EVAL PT USE INHALER: CPT

## 2018-02-01 PROCEDURE — 2580000003 HC RX 258: Performed by: INTERNAL MEDICINE

## 2018-02-01 PROCEDURE — 2580000003 HC RX 258: Performed by: NURSE PRACTITIONER

## 2018-02-01 PROCEDURE — 36415 COLL VENOUS BLD VENIPUNCTURE: CPT

## 2018-02-01 RX ORDER — INSULIN GLARGINE 100 [IU]/ML
35 INJECTION, SOLUTION SUBCUTANEOUS NIGHTLY
Status: DISCONTINUED | OUTPATIENT
Start: 2018-02-01 | End: 2018-02-06 | Stop reason: HOSPADM

## 2018-02-01 RX ORDER — ISOSORBIDE MONONITRATE 30 MG/1
30 TABLET, EXTENDED RELEASE ORAL DAILY
Status: DISCONTINUED | OUTPATIENT
Start: 2018-02-01 | End: 2018-02-06 | Stop reason: HOSPADM

## 2018-02-01 RX ORDER — NICOTINE POLACRILEX 4 MG
15 LOZENGE BUCCAL PRN
Status: DISCONTINUED | OUTPATIENT
Start: 2018-02-01 | End: 2018-02-06 | Stop reason: HOSPADM

## 2018-02-01 RX ORDER — ALBUTEROL SULFATE 2.5 MG/3ML
2.5 SOLUTION RESPIRATORY (INHALATION)
Status: DISCONTINUED | OUTPATIENT
Start: 2018-02-01 | End: 2018-02-06 | Stop reason: HOSPADM

## 2018-02-01 RX ORDER — PANTOPRAZOLE SODIUM 40 MG/1
40 TABLET, DELAYED RELEASE ORAL 2 TIMES DAILY
Status: DISCONTINUED | OUTPATIENT
Start: 2018-02-01 | End: 2018-02-06 | Stop reason: HOSPADM

## 2018-02-01 RX ORDER — TOPIRAMATE 100 MG/1
100 TABLET, FILM COATED ORAL NIGHTLY
Status: DISCONTINUED | OUTPATIENT
Start: 2018-02-01 | End: 2018-02-06 | Stop reason: HOSPADM

## 2018-02-01 RX ORDER — AMLODIPINE BESYLATE 5 MG/1
5 TABLET ORAL DAILY
Status: DISCONTINUED | OUTPATIENT
Start: 2018-02-01 | End: 2018-02-06 | Stop reason: HOSPADM

## 2018-02-01 RX ORDER — DEXTROSE MONOHYDRATE 50 MG/ML
100 INJECTION, SOLUTION INTRAVENOUS PRN
Status: DISCONTINUED | OUTPATIENT
Start: 2018-02-01 | End: 2018-02-06 | Stop reason: HOSPADM

## 2018-02-01 RX ORDER — CITALOPRAM 20 MG/1
20 TABLET ORAL 2 TIMES DAILY
Status: DISCONTINUED | OUTPATIENT
Start: 2018-02-01 | End: 2018-02-06 | Stop reason: HOSPADM

## 2018-02-01 RX ORDER — INSULIN GLARGINE 100 [IU]/ML
45 INJECTION, SOLUTION SUBCUTANEOUS EVERY MORNING
Status: DISCONTINUED | OUTPATIENT
Start: 2018-02-01 | End: 2018-02-02

## 2018-02-01 RX ORDER — DEXTROSE MONOHYDRATE 25 G/50ML
12.5 INJECTION, SOLUTION INTRAVENOUS PRN
Status: DISCONTINUED | OUTPATIENT
Start: 2018-02-01 | End: 2018-02-06 | Stop reason: HOSPADM

## 2018-02-01 RX ORDER — SODIUM CHLORIDE 0.9 % (FLUSH) 0.9 %
10 SYRINGE (ML) INJECTION EVERY 12 HOURS SCHEDULED
Status: DISCONTINUED | OUTPATIENT
Start: 2018-02-01 | End: 2018-02-05

## 2018-02-01 RX ORDER — ACETAMINOPHEN 325 MG/1
650 TABLET ORAL EVERY 4 HOURS PRN
Status: DISCONTINUED | OUTPATIENT
Start: 2018-02-01 | End: 2018-02-06 | Stop reason: HOSPADM

## 2018-02-01 RX ORDER — CLOPIDOGREL BISULFATE 75 MG/1
75 TABLET ORAL DAILY
Status: DISCONTINUED | OUTPATIENT
Start: 2018-02-01 | End: 2018-02-06 | Stop reason: HOSPADM

## 2018-02-01 RX ORDER — SODIUM CHLORIDE 0.9 % (FLUSH) 0.9 %
10 SYRINGE (ML) INJECTION PRN
Status: DISCONTINUED | OUTPATIENT
Start: 2018-02-01 | End: 2018-02-06 | Stop reason: HOSPADM

## 2018-02-01 RX ORDER — METHYLPREDNISOLONE SODIUM SUCCINATE 40 MG/ML
40 INJECTION, POWDER, LYOPHILIZED, FOR SOLUTION INTRAMUSCULAR; INTRAVENOUS EVERY 12 HOURS
Status: DISCONTINUED | OUTPATIENT
Start: 2018-02-02 | End: 2018-02-02

## 2018-02-01 RX ORDER — AZITHROMYCIN 250 MG/1
500 TABLET, FILM COATED ORAL DAILY
Status: COMPLETED | OUTPATIENT
Start: 2018-02-01 | End: 2018-02-01

## 2018-02-01 RX ORDER — IPRATROPIUM BROMIDE AND ALBUTEROL SULFATE 2.5; .5 MG/3ML; MG/3ML
1 SOLUTION RESPIRATORY (INHALATION) EVERY 4 HOURS
Status: DISCONTINUED | OUTPATIENT
Start: 2018-02-01 | End: 2018-02-06 | Stop reason: HOSPADM

## 2018-02-01 RX ORDER — PHENOL 1.4 %
10 AEROSOL, SPRAY (ML) MUCOUS MEMBRANE NIGHTLY
Status: DISCONTINUED | OUTPATIENT
Start: 2018-02-01 | End: 2018-02-01 | Stop reason: CLARIF

## 2018-02-01 RX ORDER — OXYCODONE HYDROCHLORIDE AND ACETAMINOPHEN 5; 325 MG/1; MG/1
1 TABLET ORAL EVERY 6 HOURS PRN
Status: DISCONTINUED | OUTPATIENT
Start: 2018-02-01 | End: 2018-02-06 | Stop reason: HOSPADM

## 2018-02-01 RX ORDER — CARVEDILOL 3.12 MG/1
3.12 TABLET ORAL 2 TIMES DAILY WITH MEALS
Status: DISCONTINUED | OUTPATIENT
Start: 2018-02-01 | End: 2018-02-06 | Stop reason: HOSPADM

## 2018-02-01 RX ORDER — IPRATROPIUM BROMIDE AND ALBUTEROL SULFATE 2.5; .5 MG/3ML; MG/3ML
1 SOLUTION RESPIRATORY (INHALATION)
Status: DISCONTINUED | OUTPATIENT
Start: 2018-02-01 | End: 2018-02-01

## 2018-02-01 RX ORDER — ONDANSETRON 2 MG/ML
4 INJECTION INTRAMUSCULAR; INTRAVENOUS EVERY 6 HOURS PRN
Status: DISCONTINUED | OUTPATIENT
Start: 2018-02-01 | End: 2018-02-06 | Stop reason: HOSPADM

## 2018-02-01 RX ORDER — FERROUS SULFATE 325(65) MG
325 TABLET ORAL
Status: DISCONTINUED | OUTPATIENT
Start: 2018-02-01 | End: 2018-02-06 | Stop reason: HOSPADM

## 2018-02-01 RX ORDER — METHYLPREDNISOLONE SODIUM SUCCINATE 40 MG/ML
40 INJECTION, POWDER, LYOPHILIZED, FOR SOLUTION INTRAMUSCULAR; INTRAVENOUS EVERY 6 HOURS
Status: DISCONTINUED | OUTPATIENT
Start: 2018-02-01 | End: 2018-02-01

## 2018-02-01 RX ORDER — ATORVASTATIN CALCIUM 80 MG/1
80 TABLET, FILM COATED ORAL DAILY
Status: DISCONTINUED | OUTPATIENT
Start: 2018-02-01 | End: 2018-02-06 | Stop reason: HOSPADM

## 2018-02-01 RX ORDER — NICOTINE 21 MG/24HR
1 PATCH, TRANSDERMAL 24 HOURS TRANSDERMAL DAILY PRN
Status: DISCONTINUED | OUTPATIENT
Start: 2018-02-01 | End: 2018-02-06 | Stop reason: HOSPADM

## 2018-02-01 RX ORDER — BISACODYL 10 MG
10 SUPPOSITORY, RECTAL RECTAL DAILY PRN
Status: DISCONTINUED | OUTPATIENT
Start: 2018-02-01 | End: 2018-02-06 | Stop reason: HOSPADM

## 2018-02-01 RX ORDER — SODIUM CHLORIDE 9 MG/ML
INJECTION, SOLUTION INTRAVENOUS CONTINUOUS
Status: DISCONTINUED | OUTPATIENT
Start: 2018-02-01 | End: 2018-02-01

## 2018-02-01 RX ORDER — AZITHROMYCIN 250 MG/1
250 TABLET, FILM COATED ORAL DAILY
Status: COMPLETED | OUTPATIENT
Start: 2018-02-02 | End: 2018-02-05

## 2018-02-01 RX ADMIN — INSULIN LISPRO 8 UNITS: 100 INJECTION, SOLUTION INTRAVENOUS; SUBCUTANEOUS at 09:52

## 2018-02-01 RX ADMIN — MICONAZOLE NITRATE: 20.6 POWDER TOPICAL at 21:37

## 2018-02-01 RX ADMIN — Medication 400 MG: at 21:36

## 2018-02-01 RX ADMIN — METHYLPREDNISOLONE SODIUM SUCCINATE 40 MG: 40 INJECTION, POWDER, FOR SOLUTION INTRAMUSCULAR; INTRAVENOUS at 10:09

## 2018-02-01 RX ADMIN — IPRATROPIUM BROMIDE AND ALBUTEROL SULFATE 1 AMPULE: .5; 3 SOLUTION RESPIRATORY (INHALATION) at 21:13

## 2018-02-01 RX ADMIN — OXYCODONE HYDROCHLORIDE AND ACETAMINOPHEN 1 TABLET: 5; 325 TABLET ORAL at 08:11

## 2018-02-01 RX ADMIN — CITALOPRAM HYDROBROMIDE 20 MG: 20 TABLET ORAL at 09:49

## 2018-02-01 RX ADMIN — INSULIN GLARGINE 45 UNITS: 100 INJECTION, SOLUTION SUBCUTANEOUS at 09:52

## 2018-02-01 RX ADMIN — INSULIN LISPRO 12 UNITS: 100 INJECTION, SOLUTION INTRAVENOUS; SUBCUTANEOUS at 11:20

## 2018-02-01 RX ADMIN — AZITHROMYCIN 500 MG: 250 TABLET, FILM COATED ORAL at 09:49

## 2018-02-01 RX ADMIN — AMLODIPINE BESYLATE 5 MG: 5 TABLET ORAL at 10:09

## 2018-02-01 RX ADMIN — ATORVASTATIN CALCIUM 80 MG: 80 TABLET, FILM COATED ORAL at 09:50

## 2018-02-01 RX ADMIN — INSULIN LISPRO 3 UNITS: 100 INJECTION, SOLUTION INTRAVENOUS; SUBCUTANEOUS at 21:42

## 2018-02-01 RX ADMIN — MOMETASONE FUROATE AND FORMOTEROL FUMARATE DIHYDRATE 2 PUFF: 200; 5 AEROSOL RESPIRATORY (INHALATION) at 21:46

## 2018-02-01 RX ADMIN — ENOXAPARIN SODIUM 30 MG: 30 INJECTION SUBCUTANEOUS at 09:50

## 2018-02-01 RX ADMIN — ACETAMINOPHEN 650 MG: 325 TABLET, FILM COATED ORAL at 10:09

## 2018-02-01 RX ADMIN — PANTOPRAZOLE SODIUM 40 MG: 40 TABLET, DELAYED RELEASE ORAL at 09:50

## 2018-02-01 RX ADMIN — ENOXAPARIN SODIUM 30 MG: 30 INJECTION SUBCUTANEOUS at 21:41

## 2018-02-01 RX ADMIN — SODIUM CHLORIDE: 9 INJECTION, SOLUTION INTRAVENOUS at 06:50

## 2018-02-01 RX ADMIN — CARVEDILOL 3.12 MG: 3.12 TABLET, FILM COATED ORAL at 17:44

## 2018-02-01 RX ADMIN — CLOPIDOGREL BISULFATE 75 MG: 75 TABLET ORAL at 09:49

## 2018-02-01 RX ADMIN — IPRATROPIUM BROMIDE AND ALBUTEROL SULFATE 1 AMPULE: .5; 3 SOLUTION RESPIRATORY (INHALATION) at 11:52

## 2018-02-01 RX ADMIN — INSULIN LISPRO 18 UNITS: 100 INJECTION, SOLUTION INTRAVENOUS; SUBCUTANEOUS at 17:42

## 2018-02-01 RX ADMIN — PANTOPRAZOLE SODIUM 40 MG: 40 TABLET, DELAYED RELEASE ORAL at 17:42

## 2018-02-01 RX ADMIN — INSULIN LISPRO 10 UNITS: 100 INJECTION, SOLUTION INTRAVENOUS; SUBCUTANEOUS at 13:43

## 2018-02-01 RX ADMIN — Medication 10 ML: at 21:38

## 2018-02-01 RX ADMIN — Medication 1 MG: at 21:37

## 2018-02-01 RX ADMIN — ISOSORBIDE MONONITRATE 30 MG: 30 TABLET, EXTENDED RELEASE ORAL at 09:49

## 2018-02-01 RX ADMIN — Medication 35 UNITS: at 21:42

## 2018-02-01 RX ADMIN — FERROUS SULFATE TAB 325 MG (65 MG ELEMENTAL FE) 325 MG: 325 (65 FE) TAB at 09:50

## 2018-02-01 RX ADMIN — Medication 400 MG: at 09:49

## 2018-02-01 RX ADMIN — IPRATROPIUM BROMIDE AND ALBUTEROL SULFATE 1 AMPULE: .5; 3 SOLUTION RESPIRATORY (INHALATION) at 08:48

## 2018-02-01 RX ADMIN — IPRATROPIUM BROMIDE AND ALBUTEROL SULFATE 1 AMPULE: .5; 3 SOLUTION RESPIRATORY (INHALATION) at 15:23

## 2018-02-01 RX ADMIN — METHYLPREDNISOLONE SODIUM SUCCINATE 40 MG: 40 INJECTION, POWDER, FOR SOLUTION INTRAMUSCULAR; INTRAVENOUS at 16:10

## 2018-02-01 RX ADMIN — KETOROLAC TROMETHAMINE 30 MG: 30 INJECTION, SOLUTION INTRAMUSCULAR at 00:36

## 2018-02-01 RX ADMIN — OXYCODONE HYDROCHLORIDE AND ACETAMINOPHEN 1 TABLET: 5; 325 TABLET ORAL at 16:10

## 2018-02-01 RX ADMIN — CITALOPRAM HYDROBROMIDE 20 MG: 20 TABLET ORAL at 21:36

## 2018-02-01 RX ADMIN — TOPIRAMATE 100 MG: 100 TABLET, FILM COATED ORAL at 21:41

## 2018-02-01 RX ADMIN — CARVEDILOL 3.12 MG: 3.12 TABLET, FILM COATED ORAL at 09:49

## 2018-02-01 RX ADMIN — MOMETASONE FUROATE AND FORMOTEROL FUMARATE DIHYDRATE 2 PUFF: 200; 5 AEROSOL RESPIRATORY (INHALATION) at 09:50

## 2018-02-01 ASSESSMENT — PAIN SCALES - GENERAL
PAINLEVEL_OUTOF10: 7
PAINLEVEL_OUTOF10: 7
PAINLEVEL_OUTOF10: 6
PAINLEVEL_OUTOF10: 8
PAINLEVEL_OUTOF10: 7
PAINLEVEL_OUTOF10: 9
PAINLEVEL_OUTOF10: 8

## 2018-02-01 NOTE — ED PROVIDER NOTES
American 48 (*)     GFR  59 (*)     All other components within normal limits   BRAIN NATRIURETIC PEPTIDE - Abnormal; Notable for the following:     Pro- (*)     All other components within normal limits   CBC WITH AUTO DIFFERENTIAL - Abnormal; Notable for the following:     WBC 12.5 (*)     RBC 3.87 (*)     Hemoglobin 11.7 (*)     Hematocrit 35.9 (*)     Seg Neutrophils 79 (*)     Lymphocytes 12 (*)     Monocytes 8 (*)     Segs Absolute 9.90 (*)     All other components within normal limits   PREVIOUS SPECIMEN         MEDICAL DECISION MAKING:     Patient appears to be having a COPD exacerbation sound like may be caused by a pneumonitis related to the nucleotide that she breathed in. Patient's oxygen saturation is 88% on 3 L which is her normal home oxygen. We'll give her some breathing treatments given x-ray to rule out pneumonia or fluid and some blood work and then call her pulmonologist.      EMERGENCY DEPARTMENT COURSE:   Vitals:    Vitals:    01/31/18 2208 01/31/18 2209 01/31/18 2210 01/31/18 2211   BP:    (!) 103/50   Pulse:    88   Resp:   16    Temp:       TempSrc:       SpO2: 92% 94% 93% 96%   Weight:       Height:           The patient was given the following medications while in the emergency department:  Orders Placed This Encounter   Medications    methylPREDNISolone sodium (SOLU-MEDROL) injection 125 mg    albuterol (PROVENTIL) nebulizer solution 2.5 mg       -------------------------  11:14 PM  Patient was reevaluated and she still is hypoxic on her oxygen and not feeling any better. I did speak with Dr. Alessia Aponte who is the pulmonologist on call for her doctor and she is okay with the current plan. Will speak with Nataly Collins the nurse practitioner to have the patient admitted.     CONSULTS:  IP CONSULT TO PRIMARY CARE PROVIDER  IP CONSULT TO PULMONOLOGY    PROCEDURES:  none    FINAL IMPRESSION      1. COPD exacerbation ([facility]          DISPOSITION/PLAN   DISPOSITION Decision To Admit 01/31/2018 11:11:24 PM      PATIENT REFERRED TO:  No follow-up provider specified.     DISCHARGE MEDICATIONS:  New Prescriptions    No medications on file       (Please note that portions of this note were completed with a voice recognition program.  Efforts were made to edit the dictations but occasionally words are mis-transcribed.)    Ricky Mann MD  Attending Emergency Physician                        Ricky Mann MD  01/31/18 [id number]

## 2018-02-01 NOTE — PROGRESS NOTES
· Bronchodilator assessment   [x]    Bronchodilator Assessment        Bronchodilator assessment at level  2BRONCHODILATOR ASSESSMENT SCORE  Score 1 2 3 4   Breath Sounds   []  Clear [x]  Mild Wheezing with good aeration []  Moderate I/E wheezing with adequate aeration []  Poor Aeration or diffuse wheezing   Respiratory Rate [x]  Less than 20 []  20-25 []  Greater than 25  []  Greater than 35    Dyspnea []  No SOB  [x]  SOB with minimal activity []  Speaking in partial sentences []  Acute/ At rest   Peakflow (asthma) []  80 % or greater predicted/PB  [x]  Unable []  70% or greater predicted/PB  []  Unable []  51%-70% predicted/PB  []  Unable []  Less than 50% predicted/PB  []  Unable due to distress   FEV1 % Predicted []  Greater than 69%  [x]  Unable  []  Less than 50%-69%  []  Unable  []  Less than 35%-49%  []  Unable  []  Less than 35%  []  Unable due to distress

## 2018-02-01 NOTE — CONSULTS
above,  history of atrial fibrillation, allergic rhinitis, hypertension, and morbid  obesity. SOCIAL HISTORY:  Heavy smoker, 3 packs a day from age 7 for about 28  years. She quit in 2000. FAMILY HISTORY:  Noncontributory. REVIEW OF SYSTEMS:  As history of present illness, otherwise all systems  reviewed and otherwise negative. PHYSICAL EXAMINATION:  GENERAL APPEARANCE:  Morbidly obese female, in no acute respiratory  distress. VITAL SIGNS:  Temperature 97.7, respiratory rate 16, pulse is 70, blood  pressure 125/65, and oxygen saturation on 3 liters is 91%. HEENT:  Oral cavity is notable for macroglossia and a low _____ soft  palate. LUNGS:  Diminished bilaterally with extremely poor air movement, occasional  rare scattered wheeze. CARDIAC:  S1 and S2.  ABDOMEN:  Soft. EXTREMITIES:  Show 1+ lower edema. NEUROLOGIC:  There are no focal deficits. LABORATORY DATA:  White count was 14.9 with an H and H of 11 and 34,  platelet count of 137,164. BUN is 26, creatinine 1.28, sodium is 136,  potassium 4.6, chloride 98, and bicarb 23. Sugars have been as high as 485  and generally been above 300. My impression is that the patient has acute-on-chronic respiratory failure  due to underlying COPD and sleep apnea. She needs to be aggressively  treated with aerosols every 4 hours, IV Solu-Medrol. She will need her  sugars under better control. I will check influenza because it sounds like  she got better after hospitalization at [facility] and then got worse with  the worsening cough. We will also check lower extremity venous Dopplers. Her blood sugars need to be closely monitored. We will switch her to a  diabetic diet as well. As an outpatient, I am going to recommend Pulmonary  rehab if she can get to it. We can see if transportation can be provided. I would also  continue her Symbicort.   It may be reasonable to add Spiriva as a long-term  anticholinergic rather than just Riaz Karimi    D: 02/01/2018 12:48:13       T: 02/01/2018 14:39:48     [email or web address]  Job#: [id number]     Doc#: [id number]    CC:

## 2018-02-01 NOTE — PROGRESS NOTES
Pt admitted to room 2106. Vital signs, head-to-toe assessment, as well as admission database completed. Pt oriented to room and instructed to call for assistance when getting out of bed. Blood sugar found to be 69, pt given a snack.   No signs of distress noted, will continue to monitor

## 2018-02-01 NOTE — CARE COORDINATION
[address]  DVT Prophylaxis and Vaccine Status  Work List  Mandatory for all patients      Patient must be on both Chemical prophylaxis and Mechanical prophylaxis.  If chemical/mechanical prophylaxis is not ordered, the physician must document a reason for not using prophylaxis     Chemical Prophylaxis  Is patient on chemical prophylaxis: Yes    If no chemical prophylaxis Is a order in for No Chemical VTE prophylaxis No  If no was the physician notified n/a        Mechanical Prophylaxis  Is patient on mechanical prophylaxis, intermittent pneumatic compression device: yes   If no was the physician notified n/a        Pneumonia Vaccine  Vaccine indicated:  Up-to-date  If indicated was the vaccine given: not applicable    Influenza Vaccine (applicable from October through March):  Vaccine indicated: Up-to-date  If indicated was the vaccine given: not applicable    Patient Education  Education completed on DVT prophylaxis: yes

## 2018-02-01 NOTE — PLAN OF CARE
Problem: Falls - Risk of  Goal: Absence of falls  Outcome: Ongoing  Call light within reach, bed in lowest position, and hourly rounding performed. Pt remains free from falls    Problem: Risk for Impaired Skin Integrity  Goal: Tissue integrity - skin and mucous membranes  Structural intactness and normal physiological function of skin and  mucous membranes.    Outcome: Ongoing  No further breakdown noted    Problem: Pain:  Goal: Pain level will decrease  Pain level will decrease   Outcome: Ongoing  Adequate pain control achieved this shift

## 2018-02-02 LAB
GLUCOSE BLD-MCNC: 184 MG/DL (ref 65–105)
GLUCOSE BLD-MCNC: 257 MG/DL (ref 65–105)
GLUCOSE BLD-MCNC: 321 MG/DL (ref 65–105)
GLUCOSE BLD-MCNC: 361 MG/DL (ref 65–105)

## 2018-02-02 PROCEDURE — 87070 CULTURE OTHR SPECIMN AEROBIC: CPT

## 2018-02-02 PROCEDURE — 6360000002 HC RX W HCPCS: Performed by: INTERNAL MEDICINE

## 2018-02-02 PROCEDURE — 94761 N-INVAS EAR/PLS OXIMETRY MLT: CPT

## 2018-02-02 PROCEDURE — 6370000000 HC RX 637 (ALT 250 FOR IP): Performed by: INTERNAL MEDICINE

## 2018-02-02 PROCEDURE — 99232 SBSQ HOSP IP/OBS MODERATE 35: CPT | Performed by: INTERNAL MEDICINE

## 2018-02-02 PROCEDURE — 87205 SMEAR GRAM STAIN: CPT

## 2018-02-02 PROCEDURE — 6370000000 HC RX 637 (ALT 250 FOR IP): Performed by: NURSE PRACTITIONER

## 2018-02-02 PROCEDURE — 94640 AIRWAY INHALATION TREATMENT: CPT

## 2018-02-02 PROCEDURE — 2580000003 HC RX 258: Performed by: INTERNAL MEDICINE

## 2018-02-02 PROCEDURE — 2060000000 HC ICU INTERMEDIATE R&B

## 2018-02-02 PROCEDURE — 82947 ASSAY GLUCOSE BLOOD QUANT: CPT

## 2018-02-02 RX ORDER — INSULIN GLARGINE 100 [IU]/ML
55 INJECTION, SOLUTION SUBCUTANEOUS EVERY MORNING
Status: DISCONTINUED | OUTPATIENT
Start: 2018-02-02 | End: 2018-02-06 | Stop reason: HOSPADM

## 2018-02-02 RX ORDER — METHYLPREDNISOLONE SODIUM SUCCINATE 40 MG/ML
40 INJECTION, POWDER, LYOPHILIZED, FOR SOLUTION INTRAMUSCULAR; INTRAVENOUS EVERY 8 HOURS
Status: DISCONTINUED | OUTPATIENT
Start: 2018-02-02 | End: 2018-02-05

## 2018-02-02 RX ADMIN — OXYCODONE HYDROCHLORIDE AND ACETAMINOPHEN 1 TABLET: 5; 325 TABLET ORAL at 21:56

## 2018-02-02 RX ADMIN — MICONAZOLE NITRATE: 20.6 POWDER TOPICAL at 21:40

## 2018-02-02 RX ADMIN — INSULIN LISPRO 12 UNITS: 100 INJECTION, SOLUTION INTRAVENOUS; SUBCUTANEOUS at 12:00

## 2018-02-02 RX ADMIN — Medication 1 MG: at 21:36

## 2018-02-02 RX ADMIN — Medication 10 ML: at 11:59

## 2018-02-02 RX ADMIN — MICONAZOLE NITRATE: 20.6 POWDER TOPICAL at 08:14

## 2018-02-02 RX ADMIN — METHYLPREDNISOLONE SODIUM SUCCINATE 40 MG: 40 INJECTION, POWDER, FOR SOLUTION INTRAMUSCULAR; INTRAVENOUS at 04:49

## 2018-02-02 RX ADMIN — CLOPIDOGREL BISULFATE 75 MG: 75 TABLET ORAL at 08:02

## 2018-02-02 RX ADMIN — CARVEDILOL 3.12 MG: 3.12 TABLET, FILM COATED ORAL at 16:59

## 2018-02-02 RX ADMIN — AZITHROMYCIN 250 MG: 250 TABLET, FILM COATED ORAL at 08:03

## 2018-02-02 RX ADMIN — ATORVASTATIN CALCIUM 80 MG: 80 TABLET, FILM COATED ORAL at 08:02

## 2018-02-02 RX ADMIN — TOPIRAMATE 100 MG: 100 TABLET, FILM COATED ORAL at 21:37

## 2018-02-02 RX ADMIN — METHYLPREDNISOLONE SODIUM SUCCINATE 40 MG: 40 INJECTION, POWDER, LYOPHILIZED, FOR SOLUTION INTRAMUSCULAR; INTRAVENOUS at 12:00

## 2018-02-02 RX ADMIN — IPRATROPIUM BROMIDE AND ALBUTEROL SULFATE 1 AMPULE: .5; 3 SOLUTION RESPIRATORY (INHALATION) at 00:58

## 2018-02-02 RX ADMIN — ISOSORBIDE MONONITRATE 30 MG: 30 TABLET, EXTENDED RELEASE ORAL at 08:03

## 2018-02-02 RX ADMIN — INSULIN LISPRO 15 UNITS: 100 INJECTION, SOLUTION INTRAVENOUS; SUBCUTANEOUS at 16:59

## 2018-02-02 RX ADMIN — Medication 35 UNITS: at 21:41

## 2018-02-02 RX ADMIN — Medication 10 ML: at 21:35

## 2018-02-02 RX ADMIN — ENOXAPARIN SODIUM 30 MG: 30 INJECTION SUBCUTANEOUS at 08:02

## 2018-02-02 RX ADMIN — Medication 400 MG: at 21:35

## 2018-02-02 RX ADMIN — METHYLPREDNISOLONE SODIUM SUCCINATE 40 MG: 40 INJECTION, POWDER, LYOPHILIZED, FOR SOLUTION INTRAMUSCULAR; INTRAVENOUS at 21:35

## 2018-02-02 RX ADMIN — Medication 400 MG: at 08:02

## 2018-02-02 RX ADMIN — IPRATROPIUM BROMIDE AND ALBUTEROL SULFATE 1 AMPULE: .5; 3 SOLUTION RESPIRATORY (INHALATION) at 19:49

## 2018-02-02 RX ADMIN — PANTOPRAZOLE SODIUM 40 MG: 40 TABLET, DELAYED RELEASE ORAL at 17:12

## 2018-02-02 RX ADMIN — IPRATROPIUM BROMIDE AND ALBUTEROL SULFATE 1 AMPULE: .5; 3 SOLUTION RESPIRATORY (INHALATION) at 15:35

## 2018-02-02 RX ADMIN — AMLODIPINE BESYLATE 5 MG: 5 TABLET ORAL at 08:02

## 2018-02-02 RX ADMIN — IPRATROPIUM BROMIDE AND ALBUTEROL SULFATE 1 AMPULE: .5; 3 SOLUTION RESPIRATORY (INHALATION) at 04:21

## 2018-02-02 RX ADMIN — Medication 10 ML: at 08:07

## 2018-02-02 RX ADMIN — PANTOPRAZOLE SODIUM 40 MG: 40 TABLET, DELAYED RELEASE ORAL at 08:02

## 2018-02-02 RX ADMIN — INSULIN LISPRO 5 UNITS: 100 INJECTION, SOLUTION INTRAVENOUS; SUBCUTANEOUS at 21:42

## 2018-02-02 RX ADMIN — IPRATROPIUM BROMIDE AND ALBUTEROL SULFATE 1 AMPULE: .5; 3 SOLUTION RESPIRATORY (INHALATION) at 11:14

## 2018-02-02 RX ADMIN — CITALOPRAM HYDROBROMIDE 20 MG: 20 TABLET ORAL at 08:02

## 2018-02-02 RX ADMIN — FERROUS SULFATE TAB 325 MG (65 MG ELEMENTAL FE) 325 MG: 325 (65 FE) TAB at 08:02

## 2018-02-02 RX ADMIN — Medication 55 UNITS: at 09:23

## 2018-02-02 RX ADMIN — CITALOPRAM HYDROBROMIDE 20 MG: 20 TABLET ORAL at 21:34

## 2018-02-02 RX ADMIN — CARVEDILOL 3.12 MG: 3.12 TABLET, FILM COATED ORAL at 08:03

## 2018-02-02 RX ADMIN — MOMETASONE FUROATE AND FORMOTEROL FUMARATE DIHYDRATE 2 PUFF: 200; 5 AEROSOL RESPIRATORY (INHALATION) at 21:38

## 2018-02-02 RX ADMIN — MOMETASONE FUROATE AND FORMOTEROL FUMARATE DIHYDRATE 2 PUFF: 200; 5 AEROSOL RESPIRATORY (INHALATION) at 08:03

## 2018-02-02 RX ADMIN — INSULIN LISPRO 3 UNITS: 100 INJECTION, SOLUTION INTRAVENOUS; SUBCUTANEOUS at 08:07

## 2018-02-02 RX ADMIN — ENOXAPARIN SODIUM 30 MG: 30 INJECTION SUBCUTANEOUS at 21:39

## 2018-02-02 ASSESSMENT — PAIN SCALES - GENERAL
PAINLEVEL_OUTOF10: 8
PAINLEVEL_OUTOF10: 0
PAINLEVEL_OUTOF10: 6

## 2018-02-02 NOTE — H&P
[address]     HISTORY AND PHYSICAL EXAMINATION            Patient name:  Stacy Dumont  Date of admission:  1/31/2018  8:52 PM  MRN:   [id number]  Account:  [de-identified]  YOB: 1950  PCP:    Eder García MD  Room:   [address] [place] [address]  Code Status:    Full Code    Chief Complaint:     Chief Complaint   Patient presents with    Shortness of Breath    Cough       History Obtained From:     patient    History of Present Illness: The patient is a 75 y.o. Non-/non  female who presents with Shortness of Breath and Cough   and she is admitted to the hospital for the management of  sob        HPI   1) Location/Symptom sob   2) Timing/Onset: 1 month  3) Context/Setting: hx of copd  4) Quality:worsenng since 1-2 days   5) Duration: constant  6) Modifying Factors: cough   7) Severity: moderate    She is on oxygen 24 hours a day  auto-CPAP set between 9-18 cm of  pressure with 4 liters of oxygen       Past Medical History:     Past Medical History:   Diagnosis Date    Allergic rhinitis     Anxiety 7/17/2013    Arthritis     Asthma     Atrial fibrillation (HCC)     Back pain     NERVE/DR. GRACIA    CAD (coronary artery disease) 3/21/2013    Caffeine use     2 coffee/day    CHF (congestive heart failure) (HCC)     COPD (chronic obstructive pulmonary disease) (HCC)     emphysema    Degeneration of lumbar or lumbosacral intervertebral disc     Depression     DM (diabetes mellitus) (HCC)     Emphysema of lung (HCC)     Gastritis     GERD (gastroesophageal reflux disease)     Hearing loss     Hematuria     Hiatal hernia     HTN (hypertension), benign 6/28/2014    Hypertriglyceridemia     Incontinence of urine 9/25/2013    Knee arthropathy     Migraine headache     DR. GRACIA    Mumps     On home oxygen therapy     2 L PER NC  HS AND PRN    HIGINIO on CPAP     Otitis media 1-26-15    Stroke (HCC)     QUESTIONABLE    Tinnitus     Type II or unspecified type diabetes mellitus without mention of complication, not stated as uncontrolled     UTI (lower urinary tract infection)     Varicose vein         Past Surgical History:     Past Surgical History:   Procedure Laterality Date    ABLATION OF DYSRHYTHMIC FOCUS  2000    CARPAL TUNNEL RELEASE Right     CATARACT REMOVAL WITH IMPLANT Bilateral     CHOLECYSTECTOMY  2007    COLONOSCOPY      COLONOSCOPY  04/10/2017    tubular adenomo polyp , mod. sigmoid diverticulosis, min. int. hemorrhoids    CYSTOSCOPY  9/25/2013    DILATION AND CURETTAGE  1979    EYE SURGERY      laser    INCONTINENCE SURGERY      Percutaneous nerve stimulation Dr Jeanna Maddox Nov 2013     INSERTABLE CARDIAC MONITOR  12/2015    OTHER SURGICAL HISTORY  09/10/15    removal of interstim    [place] COLSC FLX W/REMOVAL LESION BY HOT BX FORCEPS N/A 4/10/2017    COLONOSCOPY POLYPECTOMY HOT BIOPSY performed by Chi Donis MD at [address] [place] [address]  TYMPANOSTOMY TUBE PLACEMENT  08/21/2017    UPPER GASTROINTESTINAL ENDOSCOPY          Medications Prior to Admission:     Prior to Admission medications    Medication Sig Start Date End Date Taking? Authorizing Provider   oxyCODONE-acetaminophen (PERCOCET) 5-325 MG per tablet Take 1 tablet by mouth every 6 hours as needed for Pain (take one tablet three to four times a day as needed for lower back pain.) for up to 30 days.  Earliest Fill Date: 1/28/18 1/28/18 2/27/18 Yes Venetta Fleischer, APRN   losartan (COZAAR) 25 MG tablet TAKE 1 TAB BY MOUTH ONCE A DAY 12/4/17  Yes Anthony Wilson MD   topiramate (TOPAMAX) 100 MG tablet Take 1 tablet by mouth nightly 11/29/17  Yes Maru Norwood MD   atorvastatin (LIPITOR) 80 MG tablet TAKE 1 TAB BY MOUTH ONCE A DAY 11/17/17  Yes Collin Thompson MD nystatin (MYCOSTATIN) powder Apply 3 times daily. 7/30/13  Yes Jun Jones MD   insulin regular (HUMULIN R;NOVOLIN R) 100 UNIT/ML injection   Inject 10 Units into the skin See Admin Instructions Indications: 10 units daily @ 6 pm Per sliding scale    Historical Provider, MD   OXYGEN Inhale 3 L into the lungs     Historical Provider, MD        Allergies:     Robitussin [guaifenesin]; Codeine; Compazine [prochlorperazine maleate]; Iodides; Morphine; Moxifloxacin; Reglan [metoclopramide]; Avelox [moxifloxacin hcl in nacl]; Cipro xr; and Sulfa antibiotics    Social History:     Tobacco:    reports that she quit smoking about 18 years ago. Her smoking use included Cigarettes. She has a 123.00 pack-year smoking history. She has never used smokeless tobacco.  Alcohol:      reports that she does not drink alcohol. Drug Use:  reports that she does not use drugs. Family History:     Family History   Problem Relation Age of Onset    Diabetes Mother     Heart Disease Mother     Stomach Cancer Father     Diabetes Maternal Grandmother      also aunts and uncles (maternal)    Emphysema Sister        Review of Systems:     Positive and Negative as described in HPI. CONSTITUTIONAL:  negative for fevers, chills, sweats, fatigue, weight loss  HEENT:  negative for vision, hearing changes, runny nose, throat pain  RESPIRATORY:  See HPI   CARDIOVASCULAR:  negative for chest pain, palpitations.   GASTROINTESTINAL:  negative for nausea, vomiting, diarrhea, constipation, change in bowel habits, abdominal pain   GENITOURINARY:  negative for difficulty of urination, burning with urination, frequency   INTEGUMENT:  negative for rash, skin lesions, easy bruising   HEMATOLOGIC/LYMPHATIC:  negative for swelling/edema   ALLERGIC/IMMUNOLOGIC:  negative for urticaria , itching  ENDOCRINE:  negative increase in drinking, increase in urination, hot or cold intolerance  MUSCULOSKELETAL:  negative joint pains, muscle aches, swelling of testing is    Direct Exam  indicated. Status FINAL 02/01/2018    POC Glucose Fingerstick    Collection Time: 02/01/18  4:16 PM   Result Value Ref Range    POC Glucose 416 (HH) 65 - 105 mg/dL   POC Glucose Fingerstick    Collection Time: 02/01/18  6:26 PM   Result Value Ref Range    POC Glucose 354 (H) 65 - 105 mg/dL   POC Glucose Fingerstick    Collection Time: 02/01/18  9:32 PM   Result Value Ref Range    POC Glucose 216 (H) 65 - 105 mg/dL       I  Assessment :      Primary Problem  COPD exacerbation ([facility]    Active Hospital Problems    Diagnosis Date Noted    Type 2 diabetes mellitus with diabetic polyneuropathy, with long-term current use of insulin ([facility]) [E11.42, Z79.4]      Priority: High    [facility] [N17.9] 02/01/2018    HIGINIO on CPAP [G47.33, Z99.89] 02/01/2018    COPD exacerbation ([facility] [J44.1] 01/31/2018    Obesity, morbid, BMI 40.0-49.9 ([facility] [E66.01] 12/07/2016       Plan:     Patient status Admit as inpt    1. Iv steroids  2. Bronchodilation  3. Antibiotics  4. Check influenza  5. pulm consult  6. Hyperglycemia due to steroids    Consultations:   IP CONSULT TO PRIMARY CARE PROVIDER  IP CONSULT TO PULMONOLOGY  IP CONSULT TO PULMONOLOGY     Patient is admitted as inpatient status because of co-morbidities listed above, severity of signs and symptoms as outlined, requirement for current medical therapies and most importantly because of direct risk to patient if care not provided in a hospital setting.     Radha Eric MD    Copy sent to Dr. Duyen Kendall MD

## 2018-02-02 NOTE — PROGRESS NOTES
alert, oriented, in no acute distress  HEENT - normocephalic, atraumatic.  []  Mallampati  [] Crowded airway   [] Macroglossia  []  Retrognathia  [] Micrognathia  []  Normal tongue size []  Normal Bite  [] Hammad sign positive    Neck - Supple,  trachea midline   Lungs - Decreased breath sounds bilaterally; diffuse scattered wheezing; no rales or rhonchi  Cardiovascular - Heart sounds are normal.  Regular rate and rhythm   Abdomen - Soft, nontender, nondistended, no masses or organomegaly  Neurologic - There are no focal motor or sensory deficits  Skin - No bruising or bleeding  Extremities - No clubbing, cyanosis, edema    MEDS      insulin glargine  55 Units Subcutaneous QAM    sodium chloride flush  10 mL Intravenous 2 times per day    enoxaparin  30 mg Subcutaneous BID    amLODIPine  5 mg Oral Daily    atorvastatin  80 mg Oral Daily    carvedilol  3.125 mg Oral BID WC    citalopram  20 mg Oral BID    clopidogrel  75 mg Oral Daily    ferrous sulfate  325 mg Oral Daily with breakfast    topiramate  100 mg Oral Nightly    mometasone-formoterol  2 puff Inhalation BID    pantoprazole  40 mg Oral BID    miconazole   Topical BID    magnesium oxide  400 mg Oral BID    isosorbide mononitrate  30 mg Oral Daily    insulin glargine  35 Units Subcutaneous Nightly    azithromycin  250 mg Oral Daily    melatonin ER  1 mg Oral Nightly    ipratropium-albuterol  1 ampule Inhalation Q4H    insulin lispro  0-18 Units Subcutaneous TID WC    insulin lispro  0-9 Units Subcutaneous Nightly    methylPREDNISolone  40 mg Intravenous Q12H      dextrose       sodium chloride flush, acetaminophen, oxyCODONE-acetaminophen, bisacodyl, ondansetron, nicotine, albuterol, glucose, dextrose, glucagon (rDNA), dextrose    LABS   CBC   Recent Labs      02/01/18   0700   WBC  14.9*   HGB  11.0*   HCT  34.0*   MCV  95.4   PLT  234     BMP: Lab Results   Component Value Date     02/01/2018    K 4.6 02/01/2018    CL 98 working when she is using her CPAP  Electronically signed by Jahaira Ash MD on 2/2/2018 at 10:41 AM

## 2018-02-02 NOTE — H&P
[facility]      [address] [place] [address]     HISTORY AND PHYSICAL EXAMINATION            Patient name:  Brenda Arroyo  Date of admission:  1/31/2018  8:52 PM  MRN:   [id number]  Account:  [de-identified]  YOB: 1950  PCP:    Keily Atkins MD  Room:   [address] [place] [address]  Code Status:    Full Code    Chief Complaint:     Chief Complaint   Patient presents with    Shortness of Breath    Cough       History Obtained From:     patient    History of Present Illness: The patient is a 75 y.o. Non-/non  female who presents with Shortness of Breath and Cough   and she is admitted to the hospital for the management of  sob        HPI   1) Location/Symptom sob   2) Timing/Onset: 1 month  3) Context/Setting: hx of copd  4) Quality:worsenng since 1-2 days   5) Duration: constant  6) Modifying Factors: cough   7) Severity: moderate    She is on oxygen 24 hours a day  auto-CPAP set between 9-18 cm of  pressure with 4 liters of oxygen       Past Medical History:     Past Medical History:   Diagnosis Date    Allergic rhinitis     Anxiety 7/17/2013    Arthritis     Asthma     Atrial fibrillation (HCC)     Back pain     NERVE/DR. GRACIA    CAD (coronary artery disease) 3/21/2013    Caffeine use     2 coffee/day    CHF (congestive heart failure) (HCC)     COPD (chronic obstructive pulmonary disease) (HCC)     emphysema    Degeneration of lumbar or lumbosacral intervertebral disc     Depression     DM (diabetes mellitus) (HCC)     Emphysema of lung (HCC)     Gastritis     GERD (gastroesophageal reflux disease)     Hearing loss     Hematuria     Hiatal hernia     HTN (hypertension), benign 6/28/2014    Hypertriglyceridemia     Incontinence of urine 9/25/2013    Knee arthropathy     Migraine headache     DR. GRACIA    Mumps     On home oxygen joints  NEUROLOGICAL:  negative for headaches, dizziness, lightheadedness, numbness, pain, tingling extremities  BEHAVIOR/PSYCH:  negative for depression, anxiety    Physical Exam:   /60   Pulse 84   Temp 98.4 °F (36.9 °C) (Oral)   Resp 16   Ht 5' 2\" (1.575 m)   Wt 260 lb 9.3 oz (118.2 kg)   LMP  (LMP Unknown)   SpO2 97%   BMI 47.66 kg/m²   Temp (24hrs), Av.1 °F (36.7 °C), Min:97.7 °F (36.5 °C), Max:98.4 °F (36.9 °C)    Recent Labs      18   1826  18   2132  18   0709  18   1113   POCGLU  354*  216*  184*  321*       Intake/Output Summary (Last 24 hours) at 18 1419  Last data filed at 18 1243   Gross per 24 hour   Intake             1210 ml   Output              350 ml   Net              860 ml       General Appearance:  alert, well appearing, and in no acute distress  Mental status: oriented to person, place, and time with normal affect  Head:  normocephalic, atraumatic. Eye: no icterus, redness, pupils equal and reactive, extraocular eye movements intact, conjunctiva clear  Ear: normal external ear, no discharge, hearing intact  Nose:  no drainage noted  Mouth: mucous membranes moist  Neck: supple, no carotid bruits, thyroid not palpable  Lungs: Bilateral  whhezy  Cardiovascular: normal rate, regular rhythm, no murmur, gallop, rub.   Abdomen: Soft, nontender, nondistended, normal bowel sounds, no hepatomegaly or splenomegaly  Neurologic: There are no new focal motor or sensory deficits, normal muscle tone and bulk, no abnormal sensation, normal speech, cranial nerves II through XII grossly intact  Skin: No gross lesions, rashes, bruising or bleeding on exposed skin area  Extremities:  peripheral pulses palpable, no pedal edema or calf pain with palpation  Psych: normal affect         Investigations:      Laboratory Testing:  Recent Results (from the past 24 hour(s))   POC Glucose Fingerstick    Collection Time: 18  2:26 PM   Result Value Ref Range    POC IP CONSULT TO PRIMARY CARE PROVIDER  IP CONSULT TO PULMONOLOGY  IP CONSULT TO PULMONOLOGY     Patient is admitted as inpatient status because of co-morbidities listed above, severity of signs and symptoms as outlined, requirement for current medical therapies and most importantly because of direct risk to patient if care not provided in a hospital setting. Symptoms or ailment  course ;                                   are improving over time.      Timothy Montelongo MD    Copy sent to Dr. Eleazar Hilario MD

## 2018-02-02 NOTE — CARE COORDINATION
ONGOING DISCHARGE PLAN:    Spoke with patient regarding discharge plan and patient confirms that plan is still to go home w/ [facility], [address] Pt. States Jesse Oviedo will need a portable O2 tank to go home on, for she came by Ambulance, Her Son doesn't drive & there is no one to fill Her tank at home & bring here. \". Notified, Clarence Cárdenas, from [facility], for Pt. Is current w/ their services, she will follow & provide tank for pt's DC home. Pt. Remains on Iv steroids, 40 Q 12. Will continue to follow for additional discharge needs.     Electronically signed by Georgina Scales RN on 2/2/2018 at 2:30 PM

## 2018-02-02 NOTE — PLAN OF CARE
Problem: Falls - Risk of  Goal: Absence of falls  Outcome: Ongoing  Pt. Free of falls and injuries this shift. Problem: Risk for Impaired Skin Integrity  Goal: Tissue integrity - skin and mucous membranes  Structural intactness and normal physiological function of skin and  mucous membranes. Outcome: Ongoing  Skin integrity improved/maintained this shift. See head to toe assessment. Problem: Pain:  Goal: Pain level will decrease  Pain level will decrease   Outcome: Ongoing  Adequate pain control achieved this shift. See MAR.

## 2018-02-03 LAB
ANION GAP SERPL CALCULATED.3IONS-SCNC: 9 MMOL/L (ref 9–17)
BUN BLDV-MCNC: 29 MG/DL (ref 8–23)
BUN/CREAT BLD: ABNORMAL (ref 9–20)
CALCIUM SERPL-MCNC: 8.5 MG/DL (ref 8.6–10.4)
CHLORIDE BLD-SCNC: 105 MMOL/L (ref 98–107)
CO2: 26 MMOL/L (ref 20–31)
CREAT SERPL-MCNC: 0.85 MG/DL (ref 0.5–0.9)
GFR AFRICAN AMERICAN: >60 ML/MIN
GFR NON-AFRICAN AMERICAN: >60 ML/MIN
GFR SERPL CREATININE-BSD FRML MDRD: ABNORMAL ML/MIN/{1.73_M2}
GFR SERPL CREATININE-BSD FRML MDRD: ABNORMAL ML/MIN/{1.73_M2}
GLUCOSE BLD-MCNC: 156 MG/DL (ref 65–105)
GLUCOSE BLD-MCNC: 185 MG/DL (ref 70–99)
GLUCOSE BLD-MCNC: 266 MG/DL (ref 65–105)
GLUCOSE BLD-MCNC: 300 MG/DL (ref 65–105)
GLUCOSE BLD-MCNC: 406 MG/DL (ref 65–105)
HCT VFR BLD CALC: 29.9 % (ref 36–46)
HEMOGLOBIN: 10 G/DL (ref 12–16)
MCH RBC QN AUTO: 30.3 PG (ref 26–34)
MCHC RBC AUTO-ENTMCNC: 33.3 G/DL (ref 31–37)
MCV RBC AUTO: 90.9 FL (ref 80–100)
NRBC AUTOMATED: ABNORMAL PER 100 WBC
PDW BLD-RTO: 13.6 % (ref 11.5–14.9)
PLATELET # BLD: 239 K/UL (ref 150–450)
PMV BLD AUTO: 9.4 FL (ref 6–12)
POTASSIUM SERPL-SCNC: 4.6 MMOL/L (ref 3.7–5.3)
RBC # BLD: 3.3 M/UL (ref 4–5.2)
SODIUM BLD-SCNC: 140 MMOL/L (ref 135–144)
WBC # BLD: 10.7 K/UL (ref 3.5–11)

## 2018-02-03 PROCEDURE — 85027 COMPLETE CBC AUTOMATED: CPT

## 2018-02-03 PROCEDURE — 97161 PT EVAL LOW COMPLEX 20 MIN: CPT

## 2018-02-03 PROCEDURE — 36415 COLL VENOUS BLD VENIPUNCTURE: CPT

## 2018-02-03 PROCEDURE — 6360000002 HC RX W HCPCS: Performed by: INTERNAL MEDICINE

## 2018-02-03 PROCEDURE — 97116 GAIT TRAINING THERAPY: CPT

## 2018-02-03 PROCEDURE — 6370000000 HC RX 637 (ALT 250 FOR IP): Performed by: INTERNAL MEDICINE

## 2018-02-03 PROCEDURE — 94761 N-INVAS EAR/PLS OXIMETRY MLT: CPT

## 2018-02-03 PROCEDURE — 99232 SBSQ HOSP IP/OBS MODERATE 35: CPT | Performed by: INTERNAL MEDICINE

## 2018-02-03 PROCEDURE — 2060000000 HC ICU INTERMEDIATE R&B

## 2018-02-03 PROCEDURE — 97165 OT EVAL LOW COMPLEX 30 MIN: CPT

## 2018-02-03 PROCEDURE — 80048 BASIC METABOLIC PNL TOTAL CA: CPT

## 2018-02-03 PROCEDURE — 6370000000 HC RX 637 (ALT 250 FOR IP): Performed by: NURSE PRACTITIONER

## 2018-02-03 PROCEDURE — 82947 ASSAY GLUCOSE BLOOD QUANT: CPT

## 2018-02-03 PROCEDURE — 97535 SELF CARE MNGMENT TRAINING: CPT

## 2018-02-03 PROCEDURE — 94640 AIRWAY INHALATION TREATMENT: CPT

## 2018-02-03 PROCEDURE — 2580000003 HC RX 258: Performed by: INTERNAL MEDICINE

## 2018-02-03 RX ORDER — BENZONATATE 100 MG/1
200 CAPSULE ORAL EVERY 8 HOURS PRN
Status: DISCONTINUED | OUTPATIENT
Start: 2018-02-03 | End: 2018-02-06 | Stop reason: HOSPADM

## 2018-02-03 RX ADMIN — PANTOPRAZOLE SODIUM 40 MG: 40 TABLET, DELAYED RELEASE ORAL at 08:13

## 2018-02-03 RX ADMIN — ENOXAPARIN SODIUM 30 MG: 30 INJECTION SUBCUTANEOUS at 08:13

## 2018-02-03 RX ADMIN — ATORVASTATIN CALCIUM 80 MG: 80 TABLET, FILM COATED ORAL at 08:14

## 2018-02-03 RX ADMIN — Medication 10 ML: at 08:14

## 2018-02-03 RX ADMIN — CLOPIDOGREL BISULFATE 75 MG: 75 TABLET ORAL at 08:14

## 2018-02-03 RX ADMIN — FERROUS SULFATE TAB 325 MG (65 MG ELEMENTAL FE) 325 MG: 325 (65 FE) TAB at 08:14

## 2018-02-03 RX ADMIN — INSULIN LISPRO 12 UNITS: 100 INJECTION, SOLUTION INTRAVENOUS; SUBCUTANEOUS at 12:21

## 2018-02-03 RX ADMIN — METHYLPREDNISOLONE SODIUM SUCCINATE 40 MG: 40 INJECTION, POWDER, LYOPHILIZED, FOR SOLUTION INTRAMUSCULAR; INTRAVENOUS at 16:05

## 2018-02-03 RX ADMIN — AMLODIPINE BESYLATE 5 MG: 5 TABLET ORAL at 08:14

## 2018-02-03 RX ADMIN — CARVEDILOL 3.12 MG: 3.12 TABLET, FILM COATED ORAL at 08:14

## 2018-02-03 RX ADMIN — Medication 10 ML: at 20:31

## 2018-02-03 RX ADMIN — Medication 400 MG: at 20:29

## 2018-02-03 RX ADMIN — IPRATROPIUM BROMIDE AND ALBUTEROL SULFATE 1 AMPULE: .5; 3 SOLUTION RESPIRATORY (INHALATION) at 19:46

## 2018-02-03 RX ADMIN — INSULIN LISPRO 3 UNITS: 100 INJECTION, SOLUTION INTRAVENOUS; SUBCUTANEOUS at 08:16

## 2018-02-03 RX ADMIN — Medication 400 MG: at 08:14

## 2018-02-03 RX ADMIN — MICONAZOLE NITRATE: 20.6 POWDER TOPICAL at 08:19

## 2018-02-03 RX ADMIN — PANTOPRAZOLE SODIUM 40 MG: 40 TABLET, DELAYED RELEASE ORAL at 18:37

## 2018-02-03 RX ADMIN — TOPIRAMATE 100 MG: 100 TABLET, FILM COATED ORAL at 20:29

## 2018-02-03 RX ADMIN — CITALOPRAM HYDROBROMIDE 20 MG: 20 TABLET ORAL at 08:14

## 2018-02-03 RX ADMIN — OXYCODONE HYDROCHLORIDE AND ACETAMINOPHEN 1 TABLET: 5; 325 TABLET ORAL at 20:29

## 2018-02-03 RX ADMIN — MOMETASONE FUROATE AND FORMOTEROL FUMARATE DIHYDRATE 2 PUFF: 200; 5 AEROSOL RESPIRATORY (INHALATION) at 20:29

## 2018-02-03 RX ADMIN — CITALOPRAM HYDROBROMIDE 20 MG: 20 TABLET ORAL at 20:29

## 2018-02-03 RX ADMIN — Medication 10 ML: at 16:05

## 2018-02-03 RX ADMIN — IPRATROPIUM BROMIDE AND ALBUTEROL SULFATE 1 AMPULE: .5; 3 SOLUTION RESPIRATORY (INHALATION) at 11:18

## 2018-02-03 RX ADMIN — INSULIN LISPRO 9 UNITS: 100 INJECTION, SOLUTION INTRAVENOUS; SUBCUTANEOUS at 18:17

## 2018-02-03 RX ADMIN — Medication 1 MG: at 20:30

## 2018-02-03 RX ADMIN — METHYLPREDNISOLONE SODIUM SUCCINATE 40 MG: 40 INJECTION, POWDER, LYOPHILIZED, FOR SOLUTION INTRAMUSCULAR; INTRAVENOUS at 06:08

## 2018-02-03 RX ADMIN — ENOXAPARIN SODIUM 30 MG: 30 INJECTION SUBCUTANEOUS at 20:28

## 2018-02-03 RX ADMIN — CARVEDILOL 3.12 MG: 3.12 TABLET, FILM COATED ORAL at 18:38

## 2018-02-03 RX ADMIN — MOMETASONE FUROATE AND FORMOTEROL FUMARATE DIHYDRATE 2 PUFF: 200; 5 AEROSOL RESPIRATORY (INHALATION) at 08:13

## 2018-02-03 RX ADMIN — Medication 55 UNITS: at 09:47

## 2018-02-03 RX ADMIN — MICONAZOLE NITRATE: 20.6 POWDER TOPICAL at 20:31

## 2018-02-03 RX ADMIN — IPRATROPIUM BROMIDE AND ALBUTEROL SULFATE 1 AMPULE: .5; 3 SOLUTION RESPIRATORY (INHALATION) at 08:46

## 2018-02-03 RX ADMIN — ISOSORBIDE MONONITRATE 30 MG: 30 TABLET, EXTENDED RELEASE ORAL at 08:14

## 2018-02-03 RX ADMIN — Medication 35 UNITS: at 20:35

## 2018-02-03 RX ADMIN — BENZONATATE 200 MG: 100 CAPSULE ORAL at 23:33

## 2018-02-03 RX ADMIN — INSULIN LISPRO 9 UNITS: 100 INJECTION, SOLUTION INTRAVENOUS; SUBCUTANEOUS at 20:34

## 2018-02-03 RX ADMIN — AZITHROMYCIN 250 MG: 250 TABLET, FILM COATED ORAL at 08:13

## 2018-02-03 RX ADMIN — METHYLPREDNISOLONE SODIUM SUCCINATE 40 MG: 40 INJECTION, POWDER, LYOPHILIZED, FOR SOLUTION INTRAMUSCULAR; INTRAVENOUS at 20:33

## 2018-02-03 RX ADMIN — IPRATROPIUM BROMIDE AND ALBUTEROL SULFATE 1 AMPULE: .5; 3 SOLUTION RESPIRATORY (INHALATION) at 16:22

## 2018-02-03 ASSESSMENT — PAIN SCALES - GENERAL
PAINLEVEL_OUTOF10: 8
PAINLEVEL_OUTOF10: 0
PAINLEVEL_OUTOF10: 0
PAINLEVEL_OUTOF10: 8
PAINLEVEL_OUTOF10: 8

## 2018-02-03 ASSESSMENT — PAIN DESCRIPTION - LOCATION
LOCATION: HEAD
LOCATION: HEAD

## 2018-02-03 ASSESSMENT — PAIN DESCRIPTION - PAIN TYPE
TYPE: ACUTE PAIN
TYPE: ACUTE PAIN

## 2018-02-03 NOTE — PROGRESS NOTES
Sensation  Overall Sensation Status: WFL  Bed mobility  Scooting: Independent  Transfers  Sit to Stand: Modified independent  Stand to sit: Modified independent  Bed to Chair: Modified independent  Ambulation  Ambulation?: Yes  More Ambulation?: Yes  Ambulation 1  Surface: level tile  Device: Rolling Walker  Other Apparatus: O2 (2.5 lt)  Assistance: Supervision  Quality of Gait: Slow but steady gait, sao2> 90% with activity, knowledgeable in pacing skills. Distance: 35 ft     Balance  Posture: Good  Sitting - Static: Good  Sitting - Dynamic: Good  Standing - Static: Good  Standing - Dynamic: Good (RW)        Assessment   Body structures, Functions, Activity limitations: Decreased functional mobility ; Decreased endurance  Assessment: Pt with slight decreased in activity tolerance, able to ambulate short distances safely with RW. Treatment Diagnosis: Impaired fucntion. Prognosis: Good  Decision Making: Low Complexity  Patient Education: PT pOC/GOAL, increase activity  REQUIRES PT FOLLOW UP: Yes  Activity Tolerance  Activity Tolerance: Patient limited by endurance; Patient limited by fatigue     Discharge Recommendations:  Home with nursing aide, Home with Home health PT      Plan   Plan  Times per week: 5 x/wk  Current Treatment Recommendations: Balance Training, Functional Mobility Training, Transfer Training, Endurance Training, Gait Training, Home Exercise Program, Safety Education & Training  Safety Devices  Type of devices: Gait belt, Call light within reach, Left in bed    G-Code     OutComes Score                                           AM-PAC Score             Goals  Short term goals  Time Frame for Short term goals: 3 to 4 visits  Short term goal 1: Pt able to ambulate with RW dist of 70 ftx 2, SBA  Short term goal 2: Pt to tolerate activity for 20 to 30 minutes   Patient Goals   Patient goals : Breath better       Therapy Time   Individual Concurrent Group Co-treatment   Time In 1012         Time Out

## 2018-02-03 NOTE — PROGRESS NOTES
[facility]: ANA DUTTA   Occupational Therapy Evaluation  Date: 2/3/18  Patient Name: Jeffry Frank       Room: 62106-  MRN: [id number]  Account: [de-identified]   : 1950  (75 y.o.) Gender: female     Referring Practitioner: Dr. Cornell Rogel  Diagnosis: COPD exacerbation  Additional Pertinent Hx: Pt was recently at [facility]. V's from - for similar symptoms    Past Medical History:  has a past medical history of Allergic rhinitis; Anxiety; Arthritis; Asthma; Atrial fibrillation ([address] Back pain; CAD (coronary artery disease); Caffeine use; CHF (congestive heart failure) ([address] COPD (chronic obstructive pulmonary disease) ([address] Degeneration of lumbar or lumbosacral intervertebral disc; Depression; DM (diabetes mellitus) ([address] Emphysema of lung ([address] Gastritis; GERD (gastroesophageal reflux disease); Hearing loss; Hematuria; Hiatal hernia; HTN (hypertension), benign; Hypertriglyceridemia; Incontinence of urine; Knee arthropathy; Migraine headache; Mumps; On home oxygen therapy; HIGINIO on CPAP; Otitis media; Stroke ([address] Tinnitus; Type II or unspecified type diabetes mellitus without mention of complication, not stated as uncontrolled; UTI (lower urinary tract infection); and Varicose vein. Past Surgical History:   has a past surgical history that includes Cholecystectomy (); Carpal tunnel release (Right); Tympanoplasty; Dilation & curettage (); Cystocopy (2013); Cataract removal with implant (Bilateral); Tonsillectomy; Incontinence surgery; ablation of dysrhythmic focus (); Upper gastrointestinal endoscopy; eye surgery; Tubal ligation; other surgical history (09/10/15); Insertable Cardiac Monitor (2015); Tympanoplasty; Colonoscopy; Colonoscopy (04/10/2017); colsc flx w/removal lesion by hot bx forceps (N/A, 4/10/2017); and Tympanostomy tube placement (2017).     Restrictions  Restrictions/Precautions: Fall Risk, General Precautions (O2 @ 2.5L)  Implants present? : Metal

## 2018-02-03 NOTE — PROGRESS NOTES
Inhalation BID    pantoprazole  40 mg Oral BID    miconazole   Topical BID    magnesium oxide  400 mg Oral BID    isosorbide mononitrate  30 mg Oral Daily    insulin glargine  35 Units Subcutaneous Nightly    azithromycin  250 mg Oral Daily    melatonin ER  1 mg Oral Nightly    ipratropium-albuterol  1 ampule Inhalation Q4H    insulin lispro  0-18 Units Subcutaneous TID WC    insulin lispro  0-9 Units Subcutaneous Nightly     Continuous Infusions:    dextrose       PRN Meds: sodium chloride flush, acetaminophen, oxyCODONE-acetaminophen, bisacodyl, ondansetron, nicotine, albuterol, glucose, dextrose, glucagon (rDNA), dextrose    Data:     Past Medical History:   has a past medical history of Allergic rhinitis; Anxiety; Arthritis; Asthma; Atrial fibrillation ([facility] Back pain; CAD (coronary artery disease); Caffeine use; CHF (congestive heart failure) ([address] COPD (chronic obstructive pulmonary disease) ([facility] Degeneration of lumbar or lumbosacral intervertebral disc; Depression; DM (diabetes mellitus) ([facility] Emphysema of lung ([facility] Gastritis; GERD (gastroesophageal reflux disease); Hearing loss; Hematuria; Hiatal hernia; HTN (hypertension), benign; Hypertriglyceridemia; Incontinence of urine; Knee arthropathy; Migraine headache; Mumps; On home oxygen therapy; HIGINIO on CPAP; Otitis media; Stroke ([address] Tinnitus; Type II or unspecified type diabetes mellitus without mention of complication, not stated as uncontrolled; UTI (lower urinary tract infection); and Varicose vein. Social History:   reports that she quit smoking about 18 years ago. Her smoking use included Cigarettes. She has a 123.00 pack-year smoking history. She has never used smokeless tobacco. She reports that she does not drink alcohol or use drugs.      Family History:   Family History   Problem Relation Age of Onset    Diabetes Mother     Heart Disease Mother     Stomach Cancer Father     Diabetes Maternal Grandmother      also aunts and sliding scale  3. H/o Strokes   4. HTN - Controlled   5.  HIGINIO on CPAP     Regi Crowe MD  2/3/2018  10:10 AM

## 2018-02-04 LAB
ANION GAP SERPL CALCULATED.3IONS-SCNC: 10 MMOL/L (ref 9–17)
BUN BLDV-MCNC: 23 MG/DL (ref 8–23)
BUN/CREAT BLD: ABNORMAL (ref 9–20)
CALCIUM SERPL-MCNC: 8.5 MG/DL (ref 8.6–10.4)
CHLORIDE BLD-SCNC: 102 MMOL/L (ref 98–107)
CO2: 26 MMOL/L (ref 20–31)
CREAT SERPL-MCNC: 0.84 MG/DL (ref 0.5–0.9)
CULTURE: ABNORMAL
CULTURE: ABNORMAL
DIRECT EXAM: ABNORMAL
GFR AFRICAN AMERICAN: >60 ML/MIN
GFR NON-AFRICAN AMERICAN: >60 ML/MIN
GFR SERPL CREATININE-BSD FRML MDRD: ABNORMAL ML/MIN/{1.73_M2}
GFR SERPL CREATININE-BSD FRML MDRD: ABNORMAL ML/MIN/{1.73_M2}
GLUCOSE BLD-MCNC: 179 MG/DL (ref 65–105)
GLUCOSE BLD-MCNC: 184 MG/DL (ref 65–105)
GLUCOSE BLD-MCNC: 221 MG/DL (ref 70–99)
GLUCOSE BLD-MCNC: 314 MG/DL (ref 65–105)
HCT VFR BLD CALC: 31.5 % (ref 36–46)
HEMOGLOBIN: 10.4 G/DL (ref 12–16)
Lab: ABNORMAL
MCH RBC QN AUTO: 30.4 PG (ref 26–34)
MCHC RBC AUTO-ENTMCNC: 32.9 G/DL (ref 31–37)
MCV RBC AUTO: 92.3 FL (ref 80–100)
NRBC AUTOMATED: ABNORMAL PER 100 WBC
PDW BLD-RTO: 13.5 % (ref 11.5–14.9)
PLATELET # BLD: 219 K/UL (ref 150–450)
PMV BLD AUTO: 9.4 FL (ref 6–12)
POTASSIUM SERPL-SCNC: 4.8 MMOL/L (ref 3.7–5.3)
RBC # BLD: 3.42 M/UL (ref 4–5.2)
SODIUM BLD-SCNC: 138 MMOL/L (ref 135–144)
SPECIMEN DESCRIPTION: ABNORMAL
SPECIMEN DESCRIPTION: ABNORMAL
STATUS: ABNORMAL
WBC # BLD: 9.3 K/UL (ref 3.5–11)

## 2018-02-04 PROCEDURE — 2060000000 HC ICU INTERMEDIATE R&B

## 2018-02-04 PROCEDURE — 94640 AIRWAY INHALATION TREATMENT: CPT

## 2018-02-04 PROCEDURE — 36415 COLL VENOUS BLD VENIPUNCTURE: CPT

## 2018-02-04 PROCEDURE — 6360000002 HC RX W HCPCS: Performed by: INTERNAL MEDICINE

## 2018-02-04 PROCEDURE — 94761 N-INVAS EAR/PLS OXIMETRY MLT: CPT

## 2018-02-04 PROCEDURE — 6370000000 HC RX 637 (ALT 250 FOR IP): Performed by: INTERNAL MEDICINE

## 2018-02-04 PROCEDURE — 6370000000 HC RX 637 (ALT 250 FOR IP): Performed by: NURSE PRACTITIONER

## 2018-02-04 PROCEDURE — 80048 BASIC METABOLIC PNL TOTAL CA: CPT

## 2018-02-04 PROCEDURE — 99232 SBSQ HOSP IP/OBS MODERATE 35: CPT | Performed by: INTERNAL MEDICINE

## 2018-02-04 PROCEDURE — 6360000002 HC RX W HCPCS: Performed by: NURSE PRACTITIONER

## 2018-02-04 PROCEDURE — 82947 ASSAY GLUCOSE BLOOD QUANT: CPT

## 2018-02-04 PROCEDURE — 2580000003 HC RX 258: Performed by: INTERNAL MEDICINE

## 2018-02-04 PROCEDURE — 85027 COMPLETE CBC AUTOMATED: CPT

## 2018-02-04 RX ORDER — CALCIUM CARBONATE 200(500)MG
500 TABLET,CHEWABLE ORAL 3 TIMES DAILY PRN
Status: DISCONTINUED | OUTPATIENT
Start: 2018-02-04 | End: 2018-02-06 | Stop reason: HOSPADM

## 2018-02-04 RX ADMIN — AZITHROMYCIN 250 MG: 250 TABLET, FILM COATED ORAL at 07:57

## 2018-02-04 RX ADMIN — CITALOPRAM HYDROBROMIDE 20 MG: 20 TABLET ORAL at 21:08

## 2018-02-04 RX ADMIN — INSULIN LISPRO 12 UNITS: 100 INJECTION, SOLUTION INTRAVENOUS; SUBCUTANEOUS at 13:24

## 2018-02-04 RX ADMIN — IPRATROPIUM BROMIDE AND ALBUTEROL SULFATE 1 AMPULE: .5; 3 SOLUTION RESPIRATORY (INHALATION) at 06:44

## 2018-02-04 RX ADMIN — METHYLPREDNISOLONE SODIUM SUCCINATE 40 MG: 40 INJECTION, POWDER, LYOPHILIZED, FOR SOLUTION INTRAMUSCULAR; INTRAVENOUS at 21:09

## 2018-02-04 RX ADMIN — AMLODIPINE BESYLATE 5 MG: 5 TABLET ORAL at 07:57

## 2018-02-04 RX ADMIN — IPRATROPIUM BROMIDE AND ALBUTEROL SULFATE 1 AMPULE: .5; 3 SOLUTION RESPIRATORY (INHALATION) at 23:23

## 2018-02-04 RX ADMIN — BENZONATATE 200 MG: 100 CAPSULE ORAL at 21:12

## 2018-02-04 RX ADMIN — METHYLPREDNISOLONE SODIUM SUCCINATE 40 MG: 40 INJECTION, POWDER, LYOPHILIZED, FOR SOLUTION INTRAMUSCULAR; INTRAVENOUS at 05:18

## 2018-02-04 RX ADMIN — MICONAZOLE NITRATE: 20.6 POWDER TOPICAL at 07:56

## 2018-02-04 RX ADMIN — OXYCODONE HYDROCHLORIDE AND ACETAMINOPHEN 1 TABLET: 5; 325 TABLET ORAL at 07:57

## 2018-02-04 RX ADMIN — ANTACID TABLETS 500 MG: 500 TABLET, CHEWABLE ORAL at 11:21

## 2018-02-04 RX ADMIN — CARVEDILOL 3.12 MG: 3.12 TABLET, FILM COATED ORAL at 07:57

## 2018-02-04 RX ADMIN — FERROUS SULFATE TAB 325 MG (65 MG ELEMENTAL FE) 325 MG: 325 (65 FE) TAB at 07:57

## 2018-02-04 RX ADMIN — CLOPIDOGREL BISULFATE 75 MG: 75 TABLET ORAL at 07:56

## 2018-02-04 RX ADMIN — TOPIRAMATE 100 MG: 100 TABLET, FILM COATED ORAL at 21:11

## 2018-02-04 RX ADMIN — ISOSORBIDE MONONITRATE 30 MG: 30 TABLET, EXTENDED RELEASE ORAL at 07:57

## 2018-02-04 RX ADMIN — CARVEDILOL 3.12 MG: 3.12 TABLET, FILM COATED ORAL at 18:14

## 2018-02-04 RX ADMIN — IPRATROPIUM BROMIDE AND ALBUTEROL SULFATE 1 AMPULE: .5; 3 SOLUTION RESPIRATORY (INHALATION) at 12:10

## 2018-02-04 RX ADMIN — Medication 10 ML: at 05:18

## 2018-02-04 RX ADMIN — MOMETASONE FUROATE AND FORMOTEROL FUMARATE DIHYDRATE 2 PUFF: 200; 5 AEROSOL RESPIRATORY (INHALATION) at 07:56

## 2018-02-04 RX ADMIN — BENZONATATE 200 MG: 100 CAPSULE ORAL at 07:58

## 2018-02-04 RX ADMIN — METHYLPREDNISOLONE SODIUM SUCCINATE 40 MG: 40 INJECTION, POWDER, LYOPHILIZED, FOR SOLUTION INTRAMUSCULAR; INTRAVENOUS at 15:10

## 2018-02-04 RX ADMIN — Medication 400 MG: at 07:57

## 2018-02-04 RX ADMIN — ENOXAPARIN SODIUM 30 MG: 30 INJECTION SUBCUTANEOUS at 21:08

## 2018-02-04 RX ADMIN — Medication 35 UNITS: at 21:19

## 2018-02-04 RX ADMIN — Medication 55 UNITS: at 10:17

## 2018-02-04 RX ADMIN — CITALOPRAM HYDROBROMIDE 20 MG: 20 TABLET ORAL at 07:57

## 2018-02-04 RX ADMIN — ATORVASTATIN CALCIUM 80 MG: 80 TABLET, FILM COATED ORAL at 07:57

## 2018-02-04 RX ADMIN — Medication 1 MG: at 21:12

## 2018-02-04 RX ADMIN — INSULIN LISPRO 2 UNITS: 100 INJECTION, SOLUTION INTRAVENOUS; SUBCUTANEOUS at 21:19

## 2018-02-04 RX ADMIN — PANTOPRAZOLE SODIUM 40 MG: 40 TABLET, DELAYED RELEASE ORAL at 18:14

## 2018-02-04 RX ADMIN — IPRATROPIUM BROMIDE AND ALBUTEROL SULFATE 1 AMPULE: .5; 3 SOLUTION RESPIRATORY (INHALATION) at 15:51

## 2018-02-04 RX ADMIN — PANTOPRAZOLE SODIUM 40 MG: 40 TABLET, DELAYED RELEASE ORAL at 07:57

## 2018-02-04 RX ADMIN — Medication 10 ML: at 21:11

## 2018-02-04 RX ADMIN — INSULIN LISPRO 3 UNITS: 100 INJECTION, SOLUTION INTRAVENOUS; SUBCUTANEOUS at 18:13

## 2018-02-04 RX ADMIN — Medication 400 MG: at 21:08

## 2018-02-04 RX ADMIN — INSULIN LISPRO 3 UNITS: 100 INJECTION, SOLUTION INTRAVENOUS; SUBCUTANEOUS at 10:18

## 2018-02-04 RX ADMIN — MOMETASONE FUROATE AND FORMOTEROL FUMARATE DIHYDRATE 2 PUFF: 200; 5 AEROSOL RESPIRATORY (INHALATION) at 21:09

## 2018-02-04 RX ADMIN — IPRATROPIUM BROMIDE AND ALBUTEROL SULFATE 1 AMPULE: .5; 3 SOLUTION RESPIRATORY (INHALATION) at 19:33

## 2018-02-04 RX ADMIN — ENOXAPARIN SODIUM 30 MG: 30 INJECTION SUBCUTANEOUS at 07:56

## 2018-02-04 RX ADMIN — ONDANSETRON 4 MG: 2 INJECTION INTRAMUSCULAR; INTRAVENOUS at 15:10

## 2018-02-04 ASSESSMENT — PAIN SCALES - GENERAL
PAINLEVEL_OUTOF10: 6
PAINLEVEL_OUTOF10: 0
PAINLEVEL_OUTOF10: 7

## 2018-02-04 NOTE — CARE COORDINATION
ONGOING DISCHARGE PLAN:    Spoke with patient regarding discharge plan and patient confirms that plan is still home with [facility]. Pt remains on po Zithro, aerosols, and IV Solu-medrol 40mg every 8 hours. Will continue to follow for additional discharge needs.     Electronically signed by Dilip Mcleod RN on 2/4/2018 at 10:21 AM

## 2018-02-04 NOTE — PROGRESS NOTES
Patient has a congested cough and asking for medications to help break it up. Perfect serve message to Dr. Carolina Win to ask if she can have tessalon pearls as she has an allergy to mucinex.

## 2018-02-04 NOTE — PROGRESS NOTES
normal.  Regular rate and rhythm   Abdomen - Soft, nontender, nondistended, no masses or organomegaly  Neurologic - There are no focal motor or sensory deficits  Skin - No bruising or bleeding  Extremities - No clubbing, cyanosis, edema    MEDS      insulin glargine  55 Units Subcutaneous QAM    methylPREDNISolone  40 mg Intravenous Q8H    sodium chloride flush  10 mL Intravenous 2 times per day    enoxaparin  30 mg Subcutaneous BID    amLODIPine  5 mg Oral Daily    atorvastatin  80 mg Oral Daily    carvedilol  3.125 mg Oral BID WC    citalopram  20 mg Oral BID    clopidogrel  75 mg Oral Daily    ferrous sulfate  325 mg Oral Daily with breakfast    topiramate  100 mg Oral Nightly    mometasone-formoterol  2 puff Inhalation BID    pantoprazole  40 mg Oral BID    miconazole   Topical BID    magnesium oxide  400 mg Oral BID    isosorbide mononitrate  30 mg Oral Daily    insulin glargine  35 Units Subcutaneous Nightly    azithromycin  250 mg Oral Daily    melatonin ER  1 mg Oral Nightly    ipratropium-albuterol  1 ampule Inhalation Q4H    insulin lispro  0-18 Units Subcutaneous TID WC    insulin lispro  0-9 Units Subcutaneous Nightly      dextrose       calcium carbonate, benzonatate, sodium chloride flush, acetaminophen, oxyCODONE-acetaminophen, bisacodyl, ondansetron, nicotine, albuterol, glucose, dextrose, glucagon (rDNA), dextrose    LABS   CBC   Recent Labs      02/04/18   0555   WBC  9.3   HGB  10.4*   HCT  31.5*   MCV  92.3   PLT  219     BMP: Lab Results   Component Value Date     02/04/2018    K 4.8 02/04/2018     02/04/2018    CO2 26 02/04/2018    BUN 23 02/04/2018    LABALBU 4.3 10/19/2017    LABALBU 3.5 11/22/2011    CREATININE 0.84 02/04/2018    CALCIUM 8.5 02/04/2018    GFRAA >60 02/04/2018    LABGLOM >60 02/04/2018     ABGs:  Lab Results   Component Value Date    PO2ART 61.3 11/21/2011    Lab Results   Component Value Date    MODE NOT REPORTED 01/31/2018     Ionized Calcium:  No results found for: IONCA  Magnesium:    Lab Results   Component Value Date    MG 1.8 07/07/2017     Phosphorus:    Lab Results   Component Value Date    PHOS 2.8 06/06/2016        LIVER PROFILE No results for input(s): AST, ALT, LIPASE, BILIDIR, BILITOT, ALKPHOS in the last 72 hours. Invalid input(s): AMYLASE,  ALB  INR No results for input(s): INR in the last 72 hours. PTT   Lab Results   Component Value Date    APTT 55.8 (H) 06/08/2016         RADIOLOGY     (See actual reports for details)    ASSESSMENT/PLAN   Principal Problem:    COPD exacerbation ([email or web address]  Active Problems:    Type 2 diabetes mellitus with diabetic polyneuropathy, with long-term current use of insulin (HCC)    Obesity, morbid, BMI 40.0-49.9 (HCC)    YAJAIRA (acute kidney injury) ([email or web address]    HIGINIO on CPAP    Needs another day of IV Solu-Medrol  Hopefully switch to oral prednisone tomorrow  She will need a new machine as an outpatient   However, needs to keep her follow-up appointments Dr. Ramona Urbano  She really hasn't been mobilized. Hasn't even walked to the bathroom yet.   Electronically signed by Vesta Ortega MD on 2/4/2018 at 11:16 AM

## 2018-02-04 NOTE — PLAN OF CARE
Problem: Falls - Risk of  Goal: Absence of falls  Outcome: Ongoing  No falls noted this shift. Patient ambulates with standby assist without difficulty. Bed kept in low position. Safe environment maintained. Bedside table & call light in reach. Uses call light appropriately when needing assistance. Problem: Risk for Impaired Skin Integrity  Goal: Tissue integrity - skin and mucous membranes  Structural intactness and normal physiological function of skin and  mucous membranes. Outcome: Ongoing  Encouraging activity status as ordered, continuing to monitor for skin impairment risk. Skin integrity maintained. Powder for red skin folds. Problem: Pain:  Goal: Pain level will decrease  Pain level will decrease   Outcome: Ongoing  Pt medicated with pain medication prn. Assessed all pain characteristics including level, type, location, frequency, and onset. Non-pharmacologic interventions offered to pt as well. Pt states pain is tolerable at this time. Will continue to monitor    Problem: Musculor/Skeletal Functional Status  Goal: Highest potential functional level  Outcome: Ongoing  PT/ OT is on board.

## 2018-02-04 NOTE — PROGRESS NOTES
Inhalation BID    pantoprazole  40 mg Oral BID    miconazole   Topical BID    magnesium oxide  400 mg Oral BID    isosorbide mononitrate  30 mg Oral Daily    insulin glargine  35 Units Subcutaneous Nightly    azithromycin  250 mg Oral Daily    melatonin ER  1 mg Oral Nightly    ipratropium-albuterol  1 ampule Inhalation Q4H    insulin lispro  0-18 Units Subcutaneous TID WC    insulin lispro  0-9 Units Subcutaneous Nightly     Continuous Infusions:    dextrose       PRN Meds: calcium carbonate, benzonatate, sodium chloride flush, acetaminophen, oxyCODONE-acetaminophen, bisacodyl, ondansetron, nicotine, albuterol, glucose, dextrose, glucagon (rDNA), dextrose    Data:     Past Medical History:   has a past medical history of Allergic rhinitis; Anxiety; Arthritis; Asthma; Atrial fibrillation ([facility] Back pain; CAD (coronary artery disease); Caffeine use; CHF (congestive heart failure) ([facility] [facility] COPD (chronic obstructive pulmonary disease) ([facility] Degeneration of lumbar or lumbosacral intervertebral disc; Depression; DM (diabetes mellitus) ([facility] Emphysema of lung ([facility] Gastritis; GERD (gastroesophageal reflux disease); Hearing loss; Hematuria; Hiatal hernia; HTN (hypertension), benign; Hypertriglyceridemia; Incontinence of urine; Knee arthropathy; Migraine headache; Mumps; On home oxygen therapy; HIGINIO on CPAP; Otitis media; Stroke ([facility] Tinnitus; Type II or unspecified type diabetes mellitus without mention of complication, not stated as uncontrolled; UTI (lower urinary tract infection); and Varicose vein. Social History:   reports that she quit smoking about 18 years ago. Her smoking use included Cigarettes. She has a 123.00 pack-year smoking history. She has never used smokeless tobacco. She reports that she does not drink alcohol or use drugs.      Family History:   Family History   Problem Relation Age of Onset    Diabetes Mother     Heart Disease Mother     Stomach Cancer Father     Diabetes Maternal

## 2018-02-05 ENCOUNTER — TELEPHONE (OUTPATIENT)
Dept: FAMILY MEDICINE CLINIC | Age: 68
End: 2018-02-05
Payer: MEDICARE

## 2018-02-05 DIAGNOSIS — Z79.4 TYPE 2 DIABETES MELLITUS WITH DIABETIC POLYNEUROPATHY, WITH LONG-TERM CURRENT USE OF INSULIN (HCC): Chronic | ICD-10-CM

## 2018-02-05 DIAGNOSIS — J44.1 CHRONIC OBSTRUCTIVE PULMONARY DISEASE WITH ACUTE EXACERBATION (HCC): Primary | ICD-10-CM

## 2018-02-05 DIAGNOSIS — E11.42 TYPE 2 DIABETES MELLITUS WITH DIABETIC POLYNEUROPATHY, WITH LONG-TERM CURRENT USE OF INSULIN (HCC): Chronic | ICD-10-CM

## 2018-02-05 LAB
ANION GAP SERPL CALCULATED.3IONS-SCNC: 9 MMOL/L (ref 9–17)
BUN BLDV-MCNC: 25 MG/DL (ref 8–23)
BUN/CREAT BLD: ABNORMAL (ref 9–20)
CALCIUM SERPL-MCNC: 8.7 MG/DL (ref 8.6–10.4)
CHLORIDE BLD-SCNC: 99 MMOL/L (ref 98–107)
CO2: 28 MMOL/L (ref 20–31)
CREAT SERPL-MCNC: 0.84 MG/DL (ref 0.5–0.9)
GFR AFRICAN AMERICAN: >60 ML/MIN
GFR NON-AFRICAN AMERICAN: >60 ML/MIN
GFR SERPL CREATININE-BSD FRML MDRD: ABNORMAL ML/MIN/{1.73_M2}
GFR SERPL CREATININE-BSD FRML MDRD: ABNORMAL ML/MIN/{1.73_M2}
GLUCOSE BLD-MCNC: 159 MG/DL (ref 65–105)
GLUCOSE BLD-MCNC: 193 MG/DL (ref 65–105)
GLUCOSE BLD-MCNC: 204 MG/DL (ref 65–105)
GLUCOSE BLD-MCNC: 229 MG/DL (ref 65–105)
GLUCOSE BLD-MCNC: 265 MG/DL (ref 70–99)
HCT VFR BLD CALC: 32.2 % (ref 36–46)
HEMOGLOBIN: 10.8 G/DL (ref 12–16)
MCH RBC QN AUTO: 30.6 PG (ref 26–34)
MCHC RBC AUTO-ENTMCNC: 33.7 G/DL (ref 31–37)
MCV RBC AUTO: 90.8 FL (ref 80–100)
NRBC AUTOMATED: ABNORMAL PER 100 WBC
PDW BLD-RTO: 14 % (ref 11.5–14.9)
PLATELET # BLD: 226 K/UL (ref 150–450)
PMV BLD AUTO: 9.6 FL (ref 6–12)
POTASSIUM SERPL-SCNC: 5 MMOL/L (ref 3.7–5.3)
RBC # BLD: 3.54 M/UL (ref 4–5.2)
SODIUM BLD-SCNC: 136 MMOL/L (ref 135–144)
WBC # BLD: 9.9 K/UL (ref 3.5–11)

## 2018-02-05 PROCEDURE — 6370000000 HC RX 637 (ALT 250 FOR IP): Performed by: INTERNAL MEDICINE

## 2018-02-05 PROCEDURE — 2060000000 HC ICU INTERMEDIATE R&B

## 2018-02-05 PROCEDURE — 80048 BASIC METABOLIC PNL TOTAL CA: CPT

## 2018-02-05 PROCEDURE — 99232 SBSQ HOSP IP/OBS MODERATE 35: CPT | Performed by: INTERNAL MEDICINE

## 2018-02-05 PROCEDURE — 6360000002 HC RX W HCPCS: Performed by: INTERNAL MEDICINE

## 2018-02-05 PROCEDURE — 94640 AIRWAY INHALATION TREATMENT: CPT

## 2018-02-05 PROCEDURE — 97116 GAIT TRAINING THERAPY: CPT

## 2018-02-05 PROCEDURE — 94761 N-INVAS EAR/PLS OXIMETRY MLT: CPT

## 2018-02-05 PROCEDURE — 6370000000 HC RX 637 (ALT 250 FOR IP): Performed by: NURSE PRACTITIONER

## 2018-02-05 PROCEDURE — 2580000003 HC RX 258: Performed by: INTERNAL MEDICINE

## 2018-02-05 PROCEDURE — 85027 COMPLETE CBC AUTOMATED: CPT

## 2018-02-05 PROCEDURE — 82947 ASSAY GLUCOSE BLOOD QUANT: CPT

## 2018-02-05 PROCEDURE — G0179 MD RECERTIFICATION HHA PT: HCPCS | Performed by: FAMILY MEDICINE

## 2018-02-05 PROCEDURE — 97530 THERAPEUTIC ACTIVITIES: CPT

## 2018-02-05 PROCEDURE — 36415 COLL VENOUS BLD VENIPUNCTURE: CPT

## 2018-02-05 RX ORDER — PREDNISONE 20 MG/1
20 TABLET ORAL 2 TIMES DAILY
Status: DISCONTINUED | OUTPATIENT
Start: 2018-02-05 | End: 2018-02-06 | Stop reason: HOSPADM

## 2018-02-05 RX ADMIN — Medication 55 UNITS: at 11:30

## 2018-02-05 RX ADMIN — PREDNISONE 20 MG: 20 TABLET ORAL at 14:35

## 2018-02-05 RX ADMIN — INSULIN LISPRO 6 UNITS: 100 INJECTION, SOLUTION INTRAVENOUS; SUBCUTANEOUS at 11:51

## 2018-02-05 RX ADMIN — ENOXAPARIN SODIUM 30 MG: 30 INJECTION SUBCUTANEOUS at 11:26

## 2018-02-05 RX ADMIN — CITALOPRAM HYDROBROMIDE 20 MG: 20 TABLET ORAL at 11:25

## 2018-02-05 RX ADMIN — IPRATROPIUM BROMIDE AND ALBUTEROL SULFATE 1 AMPULE: .5; 3 SOLUTION RESPIRATORY (INHALATION) at 14:25

## 2018-02-05 RX ADMIN — INSULIN LISPRO 3 UNITS: 100 INJECTION, SOLUTION INTRAVENOUS; SUBCUTANEOUS at 21:25

## 2018-02-05 RX ADMIN — IPRATROPIUM BROMIDE AND ALBUTEROL SULFATE 1 AMPULE: .5; 3 SOLUTION RESPIRATORY (INHALATION) at 11:09

## 2018-02-05 RX ADMIN — FERROUS SULFATE TAB 325 MG (65 MG ELEMENTAL FE) 325 MG: 325 (65 FE) TAB at 11:25

## 2018-02-05 RX ADMIN — MOMETASONE FUROATE AND FORMOTEROL FUMARATE DIHYDRATE 2 PUFF: 200; 5 AEROSOL RESPIRATORY (INHALATION) at 11:25

## 2018-02-05 RX ADMIN — MICONAZOLE NITRATE: 20.6 POWDER TOPICAL at 11:28

## 2018-02-05 RX ADMIN — INSULIN LISPRO 3 UNITS: 100 INJECTION, SOLUTION INTRAVENOUS; SUBCUTANEOUS at 16:34

## 2018-02-05 RX ADMIN — MOMETASONE FUROATE AND FORMOTEROL FUMARATE DIHYDRATE 2 PUFF: 200; 5 AEROSOL RESPIRATORY (INHALATION) at 21:19

## 2018-02-05 RX ADMIN — Medication 400 MG: at 21:18

## 2018-02-05 RX ADMIN — BENZONATATE 200 MG: 100 CAPSULE ORAL at 21:21

## 2018-02-05 RX ADMIN — Medication 35 UNITS: at 21:23

## 2018-02-05 RX ADMIN — TOPIRAMATE 100 MG: 100 TABLET, FILM COATED ORAL at 21:19

## 2018-02-05 RX ADMIN — ISOSORBIDE MONONITRATE 30 MG: 30 TABLET, EXTENDED RELEASE ORAL at 11:25

## 2018-02-05 RX ADMIN — AZITHROMYCIN 250 MG: 250 TABLET, FILM COATED ORAL at 11:24

## 2018-02-05 RX ADMIN — PREDNISONE 20 MG: 20 TABLET ORAL at 21:18

## 2018-02-05 RX ADMIN — AMLODIPINE BESYLATE 5 MG: 5 TABLET ORAL at 11:24

## 2018-02-05 RX ADMIN — CITALOPRAM HYDROBROMIDE 20 MG: 20 TABLET ORAL at 21:18

## 2018-02-05 RX ADMIN — CARVEDILOL 3.12 MG: 3.12 TABLET, FILM COATED ORAL at 16:34

## 2018-02-05 RX ADMIN — IPRATROPIUM BROMIDE AND ALBUTEROL SULFATE 1 AMPULE: .5; 3 SOLUTION RESPIRATORY (INHALATION) at 19:19

## 2018-02-05 RX ADMIN — Medication 400 MG: at 11:25

## 2018-02-05 RX ADMIN — PANTOPRAZOLE SODIUM 40 MG: 40 TABLET, DELAYED RELEASE ORAL at 11:25

## 2018-02-05 RX ADMIN — IPRATROPIUM BROMIDE AND ALBUTEROL SULFATE 1 AMPULE: .5; 3 SOLUTION RESPIRATORY (INHALATION) at 07:41

## 2018-02-05 RX ADMIN — ENOXAPARIN SODIUM 30 MG: 30 INJECTION SUBCUTANEOUS at 21:18

## 2018-02-05 RX ADMIN — Medication 1 MG: at 21:20

## 2018-02-05 RX ADMIN — ATORVASTATIN CALCIUM 80 MG: 80 TABLET, FILM COATED ORAL at 11:25

## 2018-02-05 RX ADMIN — CARVEDILOL 3.12 MG: 3.12 TABLET, FILM COATED ORAL at 11:24

## 2018-02-05 RX ADMIN — PANTOPRAZOLE SODIUM 40 MG: 40 TABLET, DELAYED RELEASE ORAL at 16:34

## 2018-02-05 RX ADMIN — Medication 10 ML: at 05:04

## 2018-02-05 RX ADMIN — CLOPIDOGREL BISULFATE 75 MG: 75 TABLET ORAL at 11:25

## 2018-02-05 ASSESSMENT — PAIN SCALES - GENERAL: PAINLEVEL_OUTOF10: 0

## 2018-02-05 NOTE — PROGRESS NOTES
Grandmother      also aunts and uncles (maternal)    Emphysema Sister        Vitals:  BP (!) 170/98   Pulse 64   Temp 97 °F (36.1 °C) (Axillary)   Resp 18   Ht 5' 2\" (1.575 m)   Wt 261 lb 7.5 oz (118.6 kg)   LMP  (LMP Unknown)   SpO2 99%   BMI 47.82 kg/m²   Temp (24hrs), Av.8 °F (36.6 °C), Min:97 °F (36.1 °C), Max:98.6 °F (37 °C)    Recent Labs      18   0719  18   1122  18   1624  18   0646   POCGLU  179*  314*  184*  193*       I/O (24Hr): Intake/Output Summary (Last 24 hours) at 18 1101  Last data filed at 18 0551   Gross per 24 hour   Intake             1180 ml   Output              900 ml   Net              280 ml       Labs:      Lab Results   Component Value Date/Time    SPECIAL NOT REPORTED 2018 09:56 PM     Lab Results   Component Value Date/Time    CULTURE NORMAL RESPIRATORY GEOVANNY MODERATE GROWTH 2018 09:56 PM    CULTURE  2018 09:56 PM     Performed at [address] [phone]         Radiology:  Xr Chest Standard (2 Vw)    Result Date: 2018  EXAMINATION: TWO VIEWS OF THE CHEST 2018 11:06 pm COMPARISON: 2018 HISTORY: ORDERING SYSTEM PROVIDED HISTORY: shortness of breath TECHNOLOGIST PROVIDED HISTORY: Reason for exam:->shortness of breath Ordering Physician Provided Reason for Exam: pain Acuity: Unknown Type of Exam: Unknown FINDINGS: Loop recorder projects over the left chest unchanged in projection. The lungs are without acute focal process. There is no effusion or pneumothorax. The cardiomediastinal silhouette is without acute process. The osseous structures are without acute process. No acute process.      Xr Chest Standard (2 Vw)    Result Date: 2018  EXAMINATION: TWO VIEWS OF THE CHEST 2018 6:28 pm COMPARISON: 10/13/2017 HISTORY: ORDERING SYSTEM PROVIDED HISTORY: chest pain TECHNOLOGIST PROVIDED HISTORY: Reason for exam:->chest pain Left-sided chest pain and nausea FINDINGS: The lungs are without acute focal process. There is no effusion or pneumothorax. The cardiomediastinal silhouette is stable. The osseous structures are stable. No acute process. Nm Lung Vent/perfusion (vq)    Result Date: 1/23/2018  EXAMINATION: NUCLEAR MEDICINE VENTILATION PERFUSION SCAN. 1/23/2018 TECHNIQUE: 40 millicuries aerosolized Tc99m DTPA was administered via mask prior to planar imaging of the lungs in multiple projections. Then, 6 millicuries of Tc 16N MAA was administered intravenously prior to planar imaging of the lungs in similar projections. COMPARISON: Chest radiograph 1 01/20/2018 showed no acute disease. Previous ventilation profusion scan from 1/20 06/05/2016 HISTORY: ORDERING SYSTEM PROVIDED HISTORY: to rule out PE TECHNOLOGIST PROVIDED HISTORY: Ordering Physician Provided Reason for Exam: to rule out PE, leg pain , chest pressure Additional signs and symptoms: quit smoking 2000 History of chronic obstructive pulmonary disease, congestive heart failure, FINDINGS: PERFUSION: Distribution of radiotracer is mildly heterogeneous. No unmatched segmental defects identified ; matched defects seen right middle and lower lobes and several scattered subsegmental matched defects identified bilaterally. VENTILATION: Central deposition of radiotracer is compatible with COPD. CHEST RADIOGRAPH: No focal areas of consolidation or significant effusions on recent chest radiograph. Low - intermediate probability for acute PE.      Vl Dup Lower Extremity Venous Bilateral    Result Date: 2/1/2018    [facility]  Vascular Lower Extremities DVT Study Procedure   Patient Name    Leoncio Mukherjee      Date of Study             02/01/2018                  Art Face   Date of Birth   1950   Gender                    Female   Age             75 year(s)   Race                         Room Number     2106   Corporate ID #  [id number]   Patient Acct #  [de-identified]    Nightly BIPAP and Oxygen. 2. DM - Controlled, POC glucose x4 AC&HS, on sliding scale  3. HTN - Controlled  4. H/o Strokes   5. HIGINIO on CPAP     Nicolette Qureshi MD  2/5/2018  11:01 AM     I have discussed the care of Jelly Fiore , including pertinent history and exam findings,    today with the resident. I have seen and examined the patient and the key elements of all parts of the encounter have been performed by me . I agree with the assessment, plan and orders as documented by the resident.      Principal Problem:    COPD exacerbation ([address]  Active Problems:    Type 2 diabetes mellitus with diabetic polyneuropathy, with long-term current use of insulin (HCC)    Obesity, morbid, BMI 40.0-49.9 (HCC)    YAJAIRA (acute kidney injury) ([address]    HIGINIO on CPAP       Electronically signed by Dina Gaytan MD

## 2018-02-05 NOTE — FLOWSHEET NOTE
02/05/18 1723   Encounter Summary   Services provided to: Patient   Referral/Consult From: Rounding   Complexity of Encounter Low   Length of Encounter 15 minutes   Spiritual/Adventism   Type Spiritual support   Assessment Sleeping   Intervention Prayer   Outcome Did not respond

## 2018-02-05 NOTE — PROGRESS NOTES
 DILATION AND CURETTAGE  1979    EYE SURGERY      laser    INCONTINENCE SURGERY      Percutaneous nerve stimulation Dr Mejia Mis Nov 2013     INSERTABLE CARDIAC MONITOR  12/2015    OTHER SURGICAL HISTORY  09/10/15    removal of interstim    [place] COLSC FLX W/REMOVAL LESION BY HOT BX FORCEPS N/A 4/10/2017    COLONOSCOPY POLYPECTOMY HOT BIOPSY performed by Caryl Ortiz MD at [address] [place] [address]      TYMPANOSTOMY TUBE PLACEMENT  08/21/2017    UPPER GASTROINTESTINAL ENDOSCOPY         Restrictions  Restrictions/Precautions  Restrictions/Precautions: Fall Risk, General Precautions (O2 @ 2.5L)  Required Braces or Orthoses?: No  Implants present? : Metal implants (Loop recorder)  Position Activity Restriction  Other position/activity restrictions: Pt recently at [facility] from Jan 20 to 23th for similar symptoms. Subjective   Subjective  Subjective: \"I'm getting and going to bathroom on my own\". I can't walk too much at a time. Pain Screening  Patient Currently in Pain: Denies  Pain Assessment  Response to Pain Intervention: Patient Satisfied  Vital Signs  BP Location: Left Arm  Level of Consciousness: Alert  Patient Currently in Pain: Denies  Oxygen Therapy  O2 Device: Nasal cannula  O2 Flow Rate (L/min): 2.5 L/min  Patient Observation  Observations: 3 L n/c 97       Orientation     Objective   Bed mobility  Scooting: Independent  Transfers  Sit to Stand: Modified independent  Stand to sit: Modified independent  Bed to Chair: Modified independent  Ambulation  Ambulation?: Yes  More Ambulation?: Yes  Ambulation 1  Surface: level tile  Device: Rolling Walker  Other Apparatus: O2 (2.5 lt)  Assistance: Supervision  Quality of Gait: Slow but steady gait, sao2> 90% with activity, knowledgeable in pacing skills. Distance: 35 ftx2  Comments: Seated rest between ambulation, pt able to perform toilet transfers/hygine mod-I.      Balance  Posture: Good  Sitting -

## 2018-02-05 NOTE — PROGRESS NOTES
Lab Results   Component Value Date    PHOS 2.8 06/06/2016        LIVER PROFILE No results for input(s): AST, ALT, LIPASE, BILIDIR, BILITOT, ALKPHOS in the last 72 hours. Invalid input(s): AMYLASE,  ALB  INR No results for input(s): INR in the last 72 hours. PTT No results for input(s): APTT in the last 72 hours. BNP No results for input(s): BNP in the last 72 hours.     RADIOLOGY     (See actual reports for details)    ASSESSMENT/PLAN   Principal Problem:    COPD exacerbation ([facility]    Type 2 diabetes mellitus with diabetic polyneuropathy, with long-term current use of insulin (HCC)    Obesity, morbid, BMI 40.0-49.9 (HCC)    HIGINIO on CPAP  Chronic respiratory failure with hypoxia/home O2 at 3 L  Bronchospasm improved    Change to prednisone  Bronchodilator, O2  Lovenox  Hopefully home tomorrow        Electronically signed by Ely Andrade MD on 2/5/2018 at 1:23 PM

## 2018-02-06 VITALS
RESPIRATION RATE: 20 BRPM | SYSTOLIC BLOOD PRESSURE: 118 MMHG | HEIGHT: 62 IN | OXYGEN SATURATION: 97 % | TEMPERATURE: 98.8 F | WEIGHT: 261.69 LBS | DIASTOLIC BLOOD PRESSURE: 49 MMHG | HEART RATE: 68 BPM | BODY MASS INDEX: 48.16 KG/M2

## 2018-02-06 PROBLEM — J96.21 ACUTE ON CHRONIC RESPIRATORY FAILURE WITH HYPOXIA (HCC): Status: ACTIVE | Noted: 2018-02-06

## 2018-02-06 LAB
ANION GAP SERPL CALCULATED.3IONS-SCNC: 10 MMOL/L (ref 9–17)
BUN BLDV-MCNC: 27 MG/DL (ref 8–23)
BUN/CREAT BLD: ABNORMAL (ref 9–20)
CALCIUM SERPL-MCNC: 8.7 MG/DL (ref 8.6–10.4)
CHLORIDE BLD-SCNC: 101 MMOL/L (ref 98–107)
CO2: 28 MMOL/L (ref 20–31)
CREAT SERPL-MCNC: 0.79 MG/DL (ref 0.5–0.9)
GFR AFRICAN AMERICAN: >60 ML/MIN
GFR NON-AFRICAN AMERICAN: >60 ML/MIN
GFR SERPL CREATININE-BSD FRML MDRD: ABNORMAL ML/MIN/{1.73_M2}
GFR SERPL CREATININE-BSD FRML MDRD: ABNORMAL ML/MIN/{1.73_M2}
GLUCOSE BLD-MCNC: 124 MG/DL (ref 65–105)
GLUCOSE BLD-MCNC: 147 MG/DL (ref 70–99)
GLUCOSE BLD-MCNC: 257 MG/DL (ref 65–105)
HCT VFR BLD CALC: 32.7 % (ref 36–46)
HEMOGLOBIN: 11.1 G/DL (ref 12–16)
MCH RBC QN AUTO: 30.7 PG (ref 26–34)
MCHC RBC AUTO-ENTMCNC: 34 G/DL (ref 31–37)
MCV RBC AUTO: 90.3 FL (ref 80–100)
NRBC AUTOMATED: ABNORMAL PER 100 WBC
PDW BLD-RTO: 13.5 % (ref 11.5–14.9)
PLATELET # BLD: 242 K/UL (ref 150–450)
PMV BLD AUTO: 9.9 FL (ref 6–12)
POTASSIUM SERPL-SCNC: 4.8 MMOL/L (ref 3.7–5.3)
RBC # BLD: 3.63 M/UL (ref 4–5.2)
SODIUM BLD-SCNC: 139 MMOL/L (ref 135–144)
WBC # BLD: 9.4 K/UL (ref 3.5–11)

## 2018-02-06 PROCEDURE — 80048 BASIC METABOLIC PNL TOTAL CA: CPT

## 2018-02-06 PROCEDURE — 6370000000 HC RX 637 (ALT 250 FOR IP): Performed by: INTERNAL MEDICINE

## 2018-02-06 PROCEDURE — 97110 THERAPEUTIC EXERCISES: CPT

## 2018-02-06 PROCEDURE — 36415 COLL VENOUS BLD VENIPUNCTURE: CPT

## 2018-02-06 PROCEDURE — 6370000000 HC RX 637 (ALT 250 FOR IP): Performed by: NURSE PRACTITIONER

## 2018-02-06 PROCEDURE — 6360000002 HC RX W HCPCS: Performed by: INTERNAL MEDICINE

## 2018-02-06 PROCEDURE — 85027 COMPLETE CBC AUTOMATED: CPT

## 2018-02-06 PROCEDURE — 97116 GAIT TRAINING THERAPY: CPT

## 2018-02-06 PROCEDURE — 99239 HOSP IP/OBS DSCHRG MGMT >30: CPT | Performed by: INTERNAL MEDICINE

## 2018-02-06 PROCEDURE — 82947 ASSAY GLUCOSE BLOOD QUANT: CPT

## 2018-02-06 PROCEDURE — 94761 N-INVAS EAR/PLS OXIMETRY MLT: CPT

## 2018-02-06 PROCEDURE — 94640 AIRWAY INHALATION TREATMENT: CPT

## 2018-02-06 RX ORDER — PANTOPRAZOLE SODIUM 40 MG/1
40 TABLET, DELAYED RELEASE ORAL DAILY
Qty: 180 TABLET | Refills: 3 | Status: SHIPPED | OUTPATIENT
Start: 2018-02-06 | End: 2018-07-16 | Stop reason: SDUPTHER

## 2018-02-06 RX ORDER — BENZONATATE 200 MG/1
200 CAPSULE ORAL EVERY 8 HOURS PRN
Qty: 30 CAPSULE | Refills: 0 | Status: SHIPPED | OUTPATIENT
Start: 2018-02-06 | End: 2018-02-13

## 2018-02-06 RX ORDER — PREDNISONE 20 MG/1
20 TABLET ORAL 2 TIMES DAILY
Qty: 10 TABLET | Refills: 0 | Status: SHIPPED | OUTPATIENT
Start: 2018-02-06 | End: 2018-02-11

## 2018-02-06 RX ORDER — CALCIUM CARBONATE 200(500)MG
500 TABLET,CHEWABLE ORAL 3 TIMES DAILY PRN
Qty: 30 TABLET | Refills: 0 | Status: SHIPPED | OUTPATIENT
Start: 2018-02-06 | End: 2018-03-08

## 2018-02-06 RX ORDER — IPRATROPIUM BROMIDE AND ALBUTEROL SULFATE 2.5; .5 MG/3ML; MG/3ML
3 SOLUTION RESPIRATORY (INHALATION) EVERY 4 HOURS
Qty: 360 ML | Refills: 2 | Status: SHIPPED | OUTPATIENT
Start: 2018-02-06 | End: 2022-07-25

## 2018-02-06 RX ADMIN — AMLODIPINE BESYLATE 5 MG: 5 TABLET ORAL at 08:19

## 2018-02-06 RX ADMIN — MICONAZOLE NITRATE: 20.6 POWDER TOPICAL at 08:23

## 2018-02-06 RX ADMIN — PREDNISONE 20 MG: 20 TABLET ORAL at 08:19

## 2018-02-06 RX ADMIN — PREDNISONE 20 MG: 20 TABLET ORAL at 17:28

## 2018-02-06 RX ADMIN — Medication 400 MG: at 08:19

## 2018-02-06 RX ADMIN — ISOSORBIDE MONONITRATE 30 MG: 30 TABLET, EXTENDED RELEASE ORAL at 08:20

## 2018-02-06 RX ADMIN — ATORVASTATIN CALCIUM 80 MG: 80 TABLET, FILM COATED ORAL at 08:20

## 2018-02-06 RX ADMIN — ENOXAPARIN SODIUM 30 MG: 30 INJECTION SUBCUTANEOUS at 08:19

## 2018-02-06 RX ADMIN — CARVEDILOL 3.12 MG: 3.12 TABLET, FILM COATED ORAL at 08:20

## 2018-02-06 RX ADMIN — MOMETASONE FUROATE AND FORMOTEROL FUMARATE DIHYDRATE 2 PUFF: 200; 5 AEROSOL RESPIRATORY (INHALATION) at 08:20

## 2018-02-06 RX ADMIN — CITALOPRAM HYDROBROMIDE 20 MG: 20 TABLET ORAL at 08:19

## 2018-02-06 RX ADMIN — CLOPIDOGREL BISULFATE 75 MG: 75 TABLET ORAL at 08:19

## 2018-02-06 RX ADMIN — FERROUS SULFATE TAB 325 MG (65 MG ELEMENTAL FE) 325 MG: 325 (65 FE) TAB at 08:19

## 2018-02-06 RX ADMIN — PANTOPRAZOLE SODIUM 40 MG: 40 TABLET, DELAYED RELEASE ORAL at 08:19

## 2018-02-06 RX ADMIN — INSULIN LISPRO 9 UNITS: 100 INJECTION, SOLUTION INTRAVENOUS; SUBCUTANEOUS at 11:38

## 2018-02-06 RX ADMIN — IPRATROPIUM BROMIDE AND ALBUTEROL SULFATE 1 AMPULE: .5; 3 SOLUTION RESPIRATORY (INHALATION) at 11:01

## 2018-02-06 RX ADMIN — BENZONATATE 200 MG: 100 CAPSULE ORAL at 17:26

## 2018-02-06 RX ADMIN — PANTOPRAZOLE SODIUM 40 MG: 40 TABLET, DELAYED RELEASE ORAL at 17:26

## 2018-02-06 RX ADMIN — Medication 55 UNITS: at 08:31

## 2018-02-06 RX ADMIN — IPRATROPIUM BROMIDE AND ALBUTEROL SULFATE 1 AMPULE: .5; 3 SOLUTION RESPIRATORY (INHALATION) at 07:09

## 2018-02-06 RX ADMIN — CARVEDILOL 3.12 MG: 3.12 TABLET, FILM COATED ORAL at 17:26

## 2018-02-06 ASSESSMENT — ENCOUNTER SYMPTOMS
DIARRHEA: 0
VOMITING: 0
CONSTIPATION: 0
NAUSEA: 0
COUGH: 1
ABDOMINAL PAIN: 0
SHORTNESS OF BREATH: 1

## 2018-02-06 ASSESSMENT — PAIN SCALES - GENERAL: PAINLEVEL_OUTOF10: 0

## 2018-02-06 NOTE — CARE COORDINATION
ONGOING DISCHARGE PLAN:    Spoke with patient regarding discharge plan and patient confirms that plan is still to discharge to home with vns  Per Pulm  ASSESSMENT/PLAN   Principal Problem:    COPD exacerbation ([address]    Type 2 diabetes mellitus with diabetic polyneuropathy, with long-term current use of insulin ([facility])    Obesity, morbid, BMI 40.0-49.9 ([facility])    HIGINIO on CPAP  Chronic respiratory failure with hypoxia/home O2 at 3 L  Bronchospasm improved      prednisone  Bronchodilator, O2  Lovenox  Okay for discharge to home   Follow up with Dr. Lizy Reddy in 2 weeks       Will continue to follow for additional discharge needs.     Electronically signed by Sunita Tavares RN on 2/6/2018 at 2:10 PM

## 2018-02-06 NOTE — PROGRESS NOTES
Physical Therapy  Facility/Department: [facility] PROGRESSIVE CARE  Daily Treatment Note  NAME: Sue Bella  : 1950  MRN: [id number]    Date of Service: 2018    Patient Diagnosis(es):   Patient Active Problem List    Diagnosis Date Noted    Pulmonary emphysema [facility])      Priority: High    Type 2 diabetes mellitus with diabetic polyneuropathy, with long-term current use of insulin ([facility]      Priority: High    COPD (chronic obstructive pulmonary disease)      Priority: High    Nonintractable migraine, unspecified migraine type      Priority: Low    Chronic pain of left knee 2011     Priority: Low    YAJAIRA (acute kidney injury) ([facility] 2018    HIGINIO on CPAP 2018    COPD exacerbation ([address] 2018    Costochondritis 2018    Dyspepsia 2018    Neuropathy ([address] 2017    Hypoglycemia associated with type 2 diabetes mellitus (HCC)     Nodule of right lung     Lumbar radiculopathy, chronic     Obesity, morbid, BMI 40.0-49.9 ([address] 2016    Pulmonary embolism ([facility] 2016    Chronic migraine without aura without status migrainosus, not intractable     Anxiety and depression 2016    Sleep disturbance 2016    Chronic obstructive pulmonary disease ([address]     Encounter for chronic pain management     Spondylarthrosis     Bilateral low back pain with sciatica     Bilateral occipital neuralgia     Intractable migraine with aura without status migrainosus     Mixed stress and urge urinary incontinence 2015    Atypical chest pain 2015    Carpal tunnel syndrome 2015    TIA (transient ischemic attack) 2015    Bronchitis     Lumbosacral spondylosis without myelopathy 2014    Sacroiliitis, not elsewhere classified ([facility] 2014    Hyperlipidemia with target LDL less than 100 2014    Insomnia 2014    GERD (gastroesophageal reflux disease) 2014    HTN (hypertension), benign 2014    DDD (degenerative disc disease), cervical 06/27/2014    Osteoarthritis of cervical spine 06/27/2014    Occipital neuralgia 06/27/2014    Encounter for medication monitoring 06/27/2014    Chronic, continuous use of opioids 05/27/2014    Degeneration of lumbar or lumbosacral intervertebral disc 03/25/2014    Bilateral leg pain 03/25/2014    Incontinence of urine 09/25/2013    Anxiety 07/17/2013    CAD (coronary artery disease) 03/21/2013    PCB (post coital bleeding) 08/21/2012       Past Medical History:   Diagnosis Date    Allergic rhinitis     Anxiety 7/17/2013    Arthritis     Asthma     Atrial fibrillation (HCC)     Back pain     NERVE/DR. GRACIA    CAD (coronary artery disease) 3/21/2013    Caffeine use     2 coffee/day    CHF (congestive heart failure) (HCC)     COPD (chronic obstructive pulmonary disease) (HCC)     emphysema    Degeneration of lumbar or lumbosacral intervertebral disc     Depression     DM (diabetes mellitus) ([facility])     Emphysema of lung (HCC)     Gastritis     GERD (gastroesophageal reflux disease)     Hearing loss     Hematuria     Hiatal hernia     HTN (hypertension), benign 6/28/2014    Hypertriglyceridemia     Incontinence of urine 9/25/2013    Knee arthropathy     Migraine headache     DR. GRACIA    Mumps     On home oxygen therapy     2 L PER NC  HS AND PRN    HIGINIO on CPAP     Otitis media 1-26-15    Stroke ([facility])     QUESTIONABLE    Tinnitus     Type II or unspecified type diabetes mellitus without mention of complication, not stated as uncontrolled     UTI (lower urinary tract infection)     Varicose vein      Past Surgical History:   Procedure Laterality Date    ABLATION OF DYSRHYTHMIC FOCUS  2000    CARPAL TUNNEL RELEASE Right     CATARACT REMOVAL WITH IMPLANT Bilateral     CHOLECYSTECTOMY  2007    COLONOSCOPY      COLONOSCOPY  04/10/2017    tubular adenomo polyp , mod. sigmoid diverticulosis, min. int. hemorrhoids    CYSTOSCOPY  9/25/2013

## 2018-02-06 NOTE — PLAN OF CARE
Problem: Falls - Risk of  Goal: Absence of falls  Outcome: Ongoing  No falls noted this shift. Patient ambulates with per self without difficulty. Bed kept in low position. Safe environment maintained. Bedside table & call light in reach. Uses call light appropriately when needing assistance. Problem: Risk for Impaired Skin Integrity  Goal: Tissue integrity - skin and mucous membranes  Structural intactness and normal physiological function of skin and  mucous membranes. Outcome: Ongoing  Skin assessment complete. Turned and repositioned every two hours per self. Area kept free from moisture. Proper nourishment and fluids encouraged, as appropriate. Will continue to monitor for additional needs and changes in skin breakdown. Problem: Pain:  Goal: Pain level will decrease  Pain level will decrease   Outcome: Ongoing  No pain at this time. 0/10 pain scale.

## 2018-02-06 NOTE — PLAN OF CARE
Problem: Falls - Risk of  Goal: Absence of falls  Outcome: Met This Shift  No falls this shift. Up per self without difficulty. Used call light appropriately    Problem: Risk for Impaired Skin Integrity  Goal: Tissue integrity - skin and mucous membranes  Structural intactness and normal physiological function of skin and  mucous membranes. Outcome: Met This Shift  No skin breakdown noted.   Repositions self without difficulty    Problem: Pain:  Goal: Pain level will decrease  Pain level will decrease   Outcome: Met This Shift  No c/o pain this shift

## 2018-02-06 NOTE — PROGRESS NOTES
Pulmonary Progress Note  Pulmonary and Critical Care Specialists      Patient - Jelly Fiore,  Age - 75 y.o.    - 1950      Room Number - 2106/2106-01   N -  [id number]   [facility]  Date of Admission -  2018  8:52 PM        Wellington Lawrence MD  Primary Care Physician - Jeanie Douglas MD     SUBJECTIVE   Better, Some short of breath, wheezing and nonproductive cough  No chest pain, denies fever or chills    OBJECTIVE   VITALS    height is 5' 2\" (1.575 m) and weight is 261 lb 11 oz (118.7 kg). Her oral temperature is 98.8 °F (37.1 °C). Her blood pressure is 118/49 (abnormal) and her pulse is 68. Her respiration is 20 and oxygen saturation is 97%. Body mass index is 47.86 kg/m². Temperature Range: Temp: 98.8 °F (37.1 °C) Temp  Av.3 °F (36.8 °C)  Min: 97.6 °F (36.4 °C)  Max: 98.8 °F (37.1 °C)  BP Range:  Systolic (93VZL), MHC:808 , Min:118 , SJU:628     Diastolic (56LDU), AVQ:51, Min:49, Max:69    Pulse Range: Pulse  Av  Min: 61  Max: 81  Respiration Range: Resp  Av.7  Min: 16  Max: 20  Current Pulse Ox[de-identified]  SpO2: 97 %  24HR Pulse Ox Range:  SpO2  Av.7 %  Min: 93 %  Max: 100 %  Oxygen Amount and Delivery: O2 Flow Rate (L/min): 3 L/min    Wt Readings from Last 3 Encounters:   18 261 lb 11 oz (118.7 kg)   18 230 lb (104.3 kg)   18 230 lb 9.6 oz (104.6 kg)       I/O (24 Hours)    Intake/Output Summary (Last 24 hours) at 18 1351  Last data filed at 18 1015   Gross per 24 hour   Intake              790 ml   Output             2650 ml   Net            -1860 ml       EXAM     General Appearance  Awake, alert, in no acute distress  HEENT - normocephalic, atraumatic.     Neck - Supple,  trachea midline, No JVD   Lungs - coarse rhonchi, decreased sounds, no wheezing  Cardiovascular - Heart sounds are normal.  Regular rate and rhythm   Abdomen - Soft, nontender, nondistended,   Neurologic - awake and appropriateFollowing commands  Skin - No bruising or bleeding  Extremities - No clubbing, cyanosis, mild edema    MEDS      predniSONE  20 mg Oral BID    insulin glargine  55 Units Subcutaneous QAM    enoxaparin  30 mg Subcutaneous BID    amLODIPine  5 mg Oral Daily    atorvastatin  80 mg Oral Daily    carvedilol  3.125 mg Oral BID WC    citalopram  20 mg Oral BID    clopidogrel  75 mg Oral Daily    ferrous sulfate  325 mg Oral Daily with breakfast    topiramate  100 mg Oral Nightly    mometasone-formoterol  2 puff Inhalation BID    pantoprazole  40 mg Oral BID    miconazole   Topical BID    magnesium oxide  400 mg Oral BID    isosorbide mononitrate  30 mg Oral Daily    insulin glargine  35 Units Subcutaneous Nightly    melatonin ER  1 mg Oral Nightly    ipratropium-albuterol  1 ampule Inhalation Q4H    insulin lispro  0-18 Units Subcutaneous TID WC    insulin lispro  0-9 Units Subcutaneous Nightly      dextrose       calcium carbonate, benzonatate, sodium chloride flush, acetaminophen, oxyCODONE-acetaminophen, bisacodyl, ondansetron, nicotine, albuterol, glucose, dextrose, glucagon (rDNA), dextrose    LABS   CBC   Recent Labs      02/06/18   0530   WBC  9.4   HGB  11.1*   HCT  32.7*   MCV  90.3   PLT  242     BMP:   Lab Results   Component Value Date     02/06/2018    K 4.8 02/06/2018     02/06/2018    CO2 28 02/06/2018    BUN 27 02/06/2018    LABALBU 4.3 10/19/2017    LABALBU 3.5 11/22/2011    CREATININE 0.79 02/06/2018    CALCIUM 8.7 02/06/2018    GFRAA >60 02/06/2018    LABGLOM >60 02/06/2018     ABGs:  Lab Results   Component Value Date    PO2ART 61.3 11/21/2011      Lab Results   Component Value Date    MODE NOT REPORTED 01/31/2018     Ionized Calcium:  No results found for: IONCA  Magnesium:    Lab Results   Component Value Date    MG 1.8 07/07/2017      Phosphorus:    Lab Results   Component Value Date    PHOS 2.8 06/06/2016        LIVER

## 2018-02-06 NOTE — PLAN OF CARE
Problem: Falls - Risk of  Goal: Absence of falls  Outcome: Met This Shift  No falls this shift. Up per self without difficulty. Used call light appropriately    Problem: Risk for Impaired Skin Integrity  Goal: Tissue integrity - skin and mucous membranes  Structural intactness and normal physiological function of skin and  mucous membranes. Outcome: Met This Shift  No skin breakdown noted. Repositions self without difficulty    Problem: Pain:  Goal: Pain level will decrease  Pain level will decrease   Outcome: Met This Shift  No c/o pain this shift.

## 2018-02-08 ENCOUNTER — TELEPHONE (OUTPATIENT)
Dept: FAMILY MEDICINE CLINIC | Age: 68
End: 2018-02-08

## 2018-02-08 ENCOUNTER — TELEPHONE (OUTPATIENT)
Dept: FAMILY MEDICINE CLINIC | Age: 68
End: 2018-02-08
Payer: MEDICARE

## 2018-02-08 DIAGNOSIS — I10 HTN (HYPERTENSION), BENIGN: Chronic | ICD-10-CM

## 2018-02-08 DIAGNOSIS — Z79.4 TYPE 2 DIABETES MELLITUS WITH DIABETIC POLYNEUROPATHY, WITH LONG-TERM CURRENT USE OF INSULIN (HCC): Chronic | ICD-10-CM

## 2018-02-08 DIAGNOSIS — J44.9 CHRONIC OBSTRUCTIVE PULMONARY DISEASE, UNSPECIFIED COPD TYPE (HCC): Primary | ICD-10-CM

## 2018-02-08 DIAGNOSIS — E11.42 TYPE 2 DIABETES MELLITUS WITH DIABETIC POLYNEUROPATHY, WITH LONG-TERM CURRENT USE OF INSULIN (HCC): Chronic | ICD-10-CM

## 2018-02-08 DIAGNOSIS — G47.33 OSA ON CPAP: Chronic | ICD-10-CM

## 2018-02-08 DIAGNOSIS — Z99.89 OSA ON CPAP: Chronic | ICD-10-CM

## 2018-02-08 PROCEDURE — G0180 MD CERTIFICATION HHA PATIENT: HCPCS | Performed by: FAMILY MEDICINE

## 2018-02-09 NOTE — PROGRESS NOTES
.Date Patient Discharged:02/06/18      Today's Date:02/08/18      Spoke with:Pt      Do you understand the purpose of your medications?:  Yes    Do you understand the side effects of your new medications?:  Yes    Would you like to speak with a pharmacist about any of your medications or the side effects?:  No    Were you able to get your prescriptions filled?:  Yes    Did you understand your discharge instructions and what you are responsible for in managing your health after discharge?:  Yes    While you were here, was your call light answered promptly?:  Yes    Was the area around your room kept quiet at night?:  Yes    Were your room and bathroom kept clean?:Yes

## 2018-02-12 ENCOUNTER — TELEPHONE (OUTPATIENT)
Dept: FAMILY MEDICINE CLINIC | Age: 68
End: 2018-02-12
Payer: COMMERCIAL

## 2018-02-12 DIAGNOSIS — J44.1 CHRONIC OBSTRUCTIVE PULMONARY DISEASE WITH ACUTE EXACERBATION (HCC): Primary | ICD-10-CM

## 2018-02-12 DIAGNOSIS — E11.649 HYPOGLYCEMIA ASSOCIATED WITH TYPE 2 DIABETES MELLITUS (HCC): ICD-10-CM

## 2018-02-12 PROCEDURE — G0179 MD RECERTIFICATION HHA PT: HCPCS | Performed by: FAMILY MEDICINE

## 2018-02-14 ENCOUNTER — OFFICE VISIT (OUTPATIENT)
Dept: FAMILY MEDICINE CLINIC | Age: 68
End: 2018-02-14
Payer: COMMERCIAL

## 2018-02-14 ENCOUNTER — TELEPHONE (OUTPATIENT)
Dept: FAMILY MEDICINE CLINIC | Age: 68
End: 2018-02-14

## 2018-02-14 VITALS
HEART RATE: 83 BPM | OXYGEN SATURATION: 97 % | SYSTOLIC BLOOD PRESSURE: 126 MMHG | HEIGHT: 62 IN | DIASTOLIC BLOOD PRESSURE: 69 MMHG | BODY MASS INDEX: 43.76 KG/M2 | WEIGHT: 237.8 LBS

## 2018-02-14 DIAGNOSIS — J44.1 CHRONIC OBSTRUCTIVE PULMONARY DISEASE WITH ACUTE EXACERBATION (HCC): ICD-10-CM

## 2018-02-14 DIAGNOSIS — J96.21 ACUTE ON CHRONIC RESPIRATORY FAILURE WITH HYPOXIA (HCC): Primary | ICD-10-CM

## 2018-02-14 DIAGNOSIS — E55.9 VITAMIN D DEFICIENCY: ICD-10-CM

## 2018-02-14 DIAGNOSIS — Z79.4 TYPE 2 DIABETES MELLITUS WITH DIABETIC POLYNEUROPATHY, WITH LONG-TERM CURRENT USE OF INSULIN (HCC): ICD-10-CM

## 2018-02-14 DIAGNOSIS — R42 DIZZINESS: ICD-10-CM

## 2018-02-14 DIAGNOSIS — E16.2 HYPOGLYCEMIA: ICD-10-CM

## 2018-02-14 DIAGNOSIS — E11.42 TYPE 2 DIABETES MELLITUS WITH DIABETIC POLYNEUROPATHY, WITH LONG-TERM CURRENT USE OF INSULIN (HCC): ICD-10-CM

## 2018-02-14 DIAGNOSIS — K21.9 GASTROESOPHAGEAL REFLUX DISEASE WITHOUT ESOPHAGITIS: ICD-10-CM

## 2018-02-14 PROBLEM — N17.9 AKI (ACUTE KIDNEY INJURY) (HCC): Status: RESOLVED | Noted: 2018-02-01 | Resolved: 2018-02-14

## 2018-02-14 LAB — GLUCOSE BLD-MCNC: 117 MG/DL

## 2018-02-14 PROCEDURE — 82962 GLUCOSE BLOOD TEST: CPT | Performed by: FAMILY MEDICINE

## 2018-02-14 PROCEDURE — G0179 MD RECERTIFICATION HHA PT: HCPCS | Performed by: FAMILY MEDICINE

## 2018-02-14 PROCEDURE — 1111F DSCHRG MED/CURRENT MED MERGE: CPT | Performed by: FAMILY MEDICINE

## 2018-02-14 PROCEDURE — 99496 TRANSJ CARE MGMT HIGH F2F 7D: CPT | Performed by: FAMILY MEDICINE

## 2018-02-14 RX ORDER — SUCRALFATE 1 G/1
1 TABLET ORAL 4 TIMES DAILY
Qty: 120 TABLET | Refills: 3 | Status: SHIPPED | OUTPATIENT
Start: 2018-02-14 | End: 2018-11-08 | Stop reason: ALTCHOICE

## 2018-02-14 RX ORDER — CALCIUM CARBONATE-CHOLECALCIFEROL TAB 250 MG-125 UNIT 250-125 MG-UNIT
1 TAB ORAL DAILY
Qty: 60 TABLET | Refills: 3 | Status: SHIPPED | OUTPATIENT
Start: 2018-02-14 | End: 2019-05-16

## 2018-02-14 ASSESSMENT — ENCOUNTER SYMPTOMS
COUGH: 0
SINUS PRESSURE: 0
RHINORRHEA: 0
BLOOD IN STOOL: 0
ABDOMINAL PAIN: 0
DIARRHEA: 0
CONSTIPATION: 0
SORE THROAT: 0
BACK PAIN: 0
PHOTOPHOBIA: 0
WHEEZING: 0
SHORTNESS OF BREATH: 1

## 2018-02-14 ASSESSMENT — PATIENT HEALTH QUESTIONNAIRE - PHQ9
SUM OF ALL RESPONSES TO PHQ QUESTIONS 1-9: 0
2. FEELING DOWN, DEPRESSED OR HOPELESS: 0
1. LITTLE INTEREST OR PLEASURE IN DOING THINGS: 0
SUM OF ALL RESPONSES TO PHQ9 QUESTIONS 1 & 2: 0

## 2018-02-14 NOTE — PROGRESS NOTES
came back intermediate and patient responded well to steroids. Patient follows with pulmonologist Dr. Florence . Patient reports her symptoms have been stable since she has been discharge. She has home nurse coming for checks her sugars once or twice and helps her with her medications. Patient takes PPI and Zantac reports she takes both for heartburn her symptoms are not controlled with one medication. She also takes multivitamins and vitamin B12 over-the-counter which is cheap for her. Review of Systems   Constitutional: Negative for chills, diaphoresis, fatigue, fever and unexpected weight change. HENT: Negative for congestion, postnasal drip, rhinorrhea, sinus pressure and sore throat. Eyes: Negative for photophobia and visual disturbance. Respiratory: Positive for shortness of breath. Negative for cough and wheezing. Cardiovascular: Negative for chest pain and palpitations. Gastrointestinal: Negative for abdominal pain, blood in stool, constipation and diarrhea. Endocrine: Negative for polyuria. Genitourinary: Negative for decreased urine volume, dyspareunia, flank pain, frequency, hematuria and urgency. Musculoskeletal: Negative for arthralgias, back pain and neck pain. Neurological: Negative for dizziness, weakness, light-headedness, numbness and headaches. Psychiatric/Behavioral: Negative for agitation, confusion, decreased concentration, hallucinations, self-injury and sleep disturbance. The patient is not nervous/anxious and is not hyperactive.         Non face to face  following discharge, date last encounter closed (first attempt may have been earlier): 2/8/2018  8:54 AM 2/8/2018  8:54 AM    Call initiated 2 business days of discharge: Yes     Interval history/Current status: stable       Physical Exam    PHYSICAL EXAM:   VITALS:   Vitals:    02/14/18 0942   BP: 126/69   Pulse: 83   SpO2: 97%     GENERAL:  Patient is a well-developed, well-nourished female  in no Carb-Cholecalciferol (OYSCO D) 250-125 MG-UNIT TABS; Take 1 tablet by mouth daily    Other orders  -     Cancel: Tetanus-Diphth-Acell Pertussis (BOOSTRIX) 5-2.5-18.5 LF-MCG/0.5 injection;  Inject 0.5 mLs into the muscle once for 1 dose          Medical Decision Making: moderate complexity

## 2018-02-16 ENCOUNTER — TELEPHONE (OUTPATIENT)
Dept: FAMILY MEDICINE CLINIC | Age: 68
End: 2018-02-16
Payer: COMMERCIAL

## 2018-02-16 DIAGNOSIS — G62.9 NEUROPATHY: ICD-10-CM

## 2018-02-16 DIAGNOSIS — J96.21 ACUTE ON CHRONIC RESPIRATORY FAILURE WITH HYPOXIA (HCC): ICD-10-CM

## 2018-02-16 DIAGNOSIS — M51.37 DEGENERATION OF LUMBAR OR LUMBOSACRAL INTERVERTEBRAL DISC: Primary | ICD-10-CM

## 2018-02-16 PROCEDURE — G0179 MD RECERTIFICATION HHA PT: HCPCS | Performed by: FAMILY MEDICINE

## 2018-02-21 ENCOUNTER — TELEPHONE (OUTPATIENT)
Dept: FAMILY MEDICINE CLINIC | Age: 68
End: 2018-02-21
Payer: COMMERCIAL

## 2018-02-21 DIAGNOSIS — J96.21 ACUTE ON CHRONIC RESPIRATORY FAILURE WITH HYPOXIA (HCC): Primary | ICD-10-CM

## 2018-02-21 PROCEDURE — G0179 MD RECERTIFICATION HHA PT: HCPCS | Performed by: FAMILY MEDICINE

## 2018-03-05 ENCOUNTER — HOSPITAL ENCOUNTER (OUTPATIENT)
Dept: PAIN MANAGEMENT | Age: 68
Discharge: HOME | End: 2018-03-05
Payer: COMMERCIAL

## 2018-03-05 VITALS
OXYGEN SATURATION: 94 % | DIASTOLIC BLOOD PRESSURE: 53 MMHG | SYSTOLIC BLOOD PRESSURE: 111 MMHG | HEART RATE: 64 BPM | HEIGHT: 62 IN | BODY MASS INDEX: 43.61 KG/M2 | RESPIRATION RATE: 20 BRPM | WEIGHT: 237 LBS | TEMPERATURE: 97.5 F

## 2018-03-05 DIAGNOSIS — M50.30 DDD (DEGENERATIVE DISC DISEASE), CERVICAL: Primary | ICD-10-CM

## 2018-03-05 DIAGNOSIS — G62.9 NEUROPATHY: ICD-10-CM

## 2018-03-05 DIAGNOSIS — Z51.81 ENCOUNTER FOR MEDICATION MONITORING: ICD-10-CM

## 2018-03-05 DIAGNOSIS — G89.29 CHRONIC PAIN OF LEFT KNEE: ICD-10-CM

## 2018-03-05 DIAGNOSIS — F11.90 CHRONIC, CONTINUOUS USE OF OPIOIDS: ICD-10-CM

## 2018-03-05 DIAGNOSIS — M25.562 CHRONIC PAIN OF LEFT KNEE: ICD-10-CM

## 2018-03-05 DIAGNOSIS — M46.1 SACROILIITIS, NOT ELSEWHERE CLASSIFIED (HCC): ICD-10-CM

## 2018-03-05 DIAGNOSIS — G43.709 CHRONIC MIGRAINE WITHOUT AURA WITHOUT STATUS MIGRAINOSUS, NOT INTRACTABLE: ICD-10-CM

## 2018-03-05 DIAGNOSIS — M51.37 DEGENERATION OF LUMBAR OR LUMBOSACRAL INTERVERTEBRAL DISC: ICD-10-CM

## 2018-03-05 DIAGNOSIS — M47.817 LUMBOSACRAL SPONDYLOSIS WITHOUT MYELOPATHY: ICD-10-CM

## 2018-03-05 DIAGNOSIS — M54.81 BILATERAL OCCIPITAL NEURALGIA: ICD-10-CM

## 2018-03-05 DIAGNOSIS — M50.30 DEGENERATIVE CERVICAL DISC: ICD-10-CM

## 2018-03-05 DIAGNOSIS — M51.36 DDD (DEGENERATIVE DISC DISEASE), LUMBAR: ICD-10-CM

## 2018-03-05 DIAGNOSIS — G89.29 ENCOUNTER FOR CHRONIC PAIN MANAGEMENT: ICD-10-CM

## 2018-03-05 DIAGNOSIS — E66.01 OBESITY, MORBID, BMI 40.0-49.9 (HCC): ICD-10-CM

## 2018-03-05 DIAGNOSIS — M47.812 OSTEOARTHRITIS OF CERVICAL SPINE, UNSPECIFIED SPINAL OSTEOARTHRITIS COMPLICATION STATUS: ICD-10-CM

## 2018-03-05 DIAGNOSIS — G43.119 INTRACTABLE MIGRAINE WITH AURA WITHOUT STATUS MIGRAINOSUS: ICD-10-CM

## 2018-03-05 PROCEDURE — 99213 OFFICE O/P EST LOW 20 MIN: CPT

## 2018-03-05 PROCEDURE — 99213 OFFICE O/P EST LOW 20 MIN: CPT | Performed by: NURSE PRACTITIONER

## 2018-03-05 RX ORDER — OXYCODONE HYDROCHLORIDE AND ACETAMINOPHEN 5; 325 MG/1; MG/1
1 TABLET ORAL EVERY 6 HOURS PRN
Qty: 100 TABLET | Refills: 0 | Status: SHIPPED | OUTPATIENT
Start: 2018-03-05 | End: 2018-04-02 | Stop reason: SDUPTHER

## 2018-03-05 ASSESSMENT — ENCOUNTER SYMPTOMS
BACK PAIN: 1
GASTROINTESTINAL NEGATIVE: 1
COUGH: 1

## 2018-03-05 NOTE — PROGRESS NOTES
APRN)  Documentation: No signs of potential drug abuse or diversion identified., Obtaining appropriate analgesic effect of treatment. Xin Sidhu, VERONIKA)  Medication Contracts: Existing medication contract. (Xin Sidhu, VERONIKA)  Review of [place]ARRS does not show any aberrant prescription behavior. Medication is helping the patient stay active. Patient denies any side effects and reports adequate analgesia. No sign of misuse/abuse. Past Medical History:   Diagnosis Date    Allergic rhinitis     Anxiety 7/17/2013    Arthritis     Asthma     Atrial fibrillation (HCC)     Back pain     NERVE/DR. GRACIA    CAD (coronary artery disease) 3/21/2013    Caffeine use     2 coffee/day    CHF (congestive heart failure) (HCC)     COPD (chronic obstructive pulmonary disease) (HCC)     emphysema    Degeneration of lumbar or lumbosacral intervertebral disc     Depression     DM (diabetes mellitus) (HCC)     Emphysema of lung (HCC)     Gastritis     GERD (gastroesophageal reflux disease)     Hearing loss     Hematuria     Hiatal hernia     HTN (hypertension), benign 6/28/2014    Hypertriglyceridemia     Incontinence of urine 9/25/2013    Knee arthropathy     Migraine headache     DR. GRACIA    Mumps     On home oxygen therapy     2 L PER NC  HS AND PRN    HIGINIO on CPAP     Otitis media 1-26-15    Stroke ([facility])     QUESTIONABLE    Tinnitus     Type II or unspecified type diabetes mellitus without mention of complication, not stated as uncontrolled     UTI (lower urinary tract infection)     Varicose vein        Past Surgical History:   Procedure Laterality Date    ABLATION OF DYSRHYTHMIC FOCUS  2000    CARPAL TUNNEL RELEASE Right     CATARACT REMOVAL WITH IMPLANT Bilateral     CHOLECYSTECTOMY  2007    COLONOSCOPY      COLONOSCOPY  04/10/2017    tubular adenomo polyp , mod. sigmoid diverticulosis, min. int. hemorrhoids    CYSTOSCOPY  9/25/2013    DILATION AND CURETTAGE  1979    EYE SURGERY MG TABS, Take 10 mg by mouth nightly, Disp: 90 tablet, Rfl: 3    clopidogrel (PLAVIX) 75 MG tablet, TAKE 1 TAB BY MOUTH ONCE A DAY, Disp: 90 tablet, Rfl: 3    insulin glargine (LANTUS SOLOSTAR) 100 UNIT/ML injection pen, Inject 35 Units into the skin nightly, Disp: 5 Pen, Rfl: 3    insulin glargine (LANTUS SOLOSTAR) 100 UNIT/ML injection pen, Inject 45 Units into the skin every morning (Patient taking differently: Inject 45 Units into the skin every morning 45 U in the morning and 35 U in the evening), Disp: 5 Pen, Rfl: 3    amLODIPine (NORVASC) 5 MG tablet, TAKE 1 TAB BY MOUTH ONCE A DAY, Disp: 90 tablet, Rfl: 0    isosorbide mononitrate (IMDUR) 30 MG extended release tablet, TAKE 1 TABLET BY MOUTH DAILY, Disp: 30 tablet, Rfl: 5    ferrous sulfate 325 (65 FE) MG tablet, Take 325 mg by mouth daily (with breakfast), Disp: , Rfl:     magnesium oxide (MAG-OX) 400 MG tablet, Take 400 mg by mouth 2 times daily. , Disp: , Rfl:     OXYGEN, Inhale 3 L into the lungs , Disp: , Rfl:     nystatin (MYCOSTATIN) powder, Apply 3 times daily. , Disp: 3 Bottle, Rfl: 3    calcium carbonate (TUMS) 500 MG chewable tablet, Take 1 tablet by mouth 3 times daily as needed for Heartburn, Disp: 30 tablet, Rfl: 0    ipratropium-albuterol (DUONEB) 0.5-2.5 (3) MG/3ML SOLN nebulizer solution, Inhale 3 mLs into the lungs every 4 hours, Disp: 360 mL, Rfl: 2    topiramate (TOPAMAX) 100 MG tablet, Take 1 tablet by mouth nightly, Disp: 90 tablet, Rfl: 1    furosemide (LASIX) 20 MG tablet, TAKE 1 TABLET BY MOUTH AS NEEDED (LEG SWELLING), Disp: 30 tablet, Rfl: 3    citalopram (CELEXA) 40 MG tablet, TAKE 1 TAB BY MOUTH ONCE A DAY, Disp: 90 tablet, Rfl: 3    Compression Stockings MISC, by Does not apply route Pressure between 20 - 25 ., Disp: 1 each, Rfl: 0    Insulin Pen Needle 31G X 6 MM MISC, 1 each by Does not apply route daily, Disp: 100 each, Rfl: 3    ULTICARE SHORT PEN NEEDLES 31G X 8 MM MISC, AS DIRECTED, Disp: 100 each, Rfl: 5   docusate sodium (COLACE) 100 MG capsule, Take 1 capsule by mouth 2 times daily Please use while taking Percocet, Disp: 30 capsule, Rfl: 0    SYMBICORT 160-4.5 MCG/ACT AERO,  2 puffs As needed for sob, Disp: , Rfl:     Family History   Problem Relation Age of Onset    Diabetes Mother     Heart Disease Mother     Stomach Cancer Father     Diabetes Maternal Grandmother      also aunts and uncles (maternal)    Emphysema Sister        Social History     Social History    Marital status: Single     Spouse name: N/A    Number of children: N/A    Years of education: N/A     Occupational History    disabled N/A     Social History Main Topics    Smoking status: Former Smoker     Packs/day: 3.00     Years: 41.00     Types: Cigarettes     Quit date: 1/1/2000    Smokeless tobacco: Never Used      Comment: quit in 2000    Alcohol use No    Drug use: No    Sexual activity: Not Currently     Other Topics Concern    Not on file     Social History Narrative    No narrative on file       Review of Systems:  Review of Systems   Constitution: Negative. HENT: Negative. Eyes:        Glasses   Respiratory: Positive for cough. Oxygen   Endocrine:        Blood sugars under control   Skin: Negative. Musculoskeletal: Positive for back pain and joint pain. Gastrointestinal: Negative. Genitourinary: Positive for nocturia. Neurological:        Uses a walker    Psychiatric/Behavioral: Positive for depression. Manageable         Physical Exam:  BP (!) 111/53   Pulse 64   Temp 97.5 °F (36.4 °C) (Oral)   Resp 20   Ht 5' 2\" (1.575 m)   Wt 237 lb (107.5 kg)   LMP  (LMP Unknown)   SpO2 94%   BMI 43.35 kg/m²     Physical Exam     Constitutional: She is well-developed, well-nourished, and in no distress. obese   HENT:   Head: Normocephalic. Neck: Normal range of motion. Neck supple.       Pulmonary/Chest: Effort normal.   Portable oxygen   Musculoskeletal:        Lumbar back: She exhibits Relevant Medications    oxyCODONE-acetaminophen (PERCOCET) 5-325 MG per tablet    Osteoarthritis of cervical spine (Chronic)    Relevant Medications    oxyCODONE-acetaminophen (PERCOCET) 5-325 MG per tablet    Occipital neuralgia (Chronic)    Relevant Medications    oxyCODONE-acetaminophen (PERCOCET) 5-325 MG per tablet    Lumbosacral spondylosis without myelopathy (Chronic)    Relevant Medications    oxyCODONE-acetaminophen (PERCOCET) 5-325 MG per tablet    Sacroiliitis, not elsewhere classified (HCC) (Chronic)    Relevant Medications    oxyCODONE-acetaminophen (PERCOCET) 5-325 MG per tablet    Obesity, morbid, BMI 40.0-49.9 (HCC) (Chronic)    Chronic pain of left knee    Relevant Medications    oxyCODONE-acetaminophen (PERCOCET) 5-325 MG per tablet    Degeneration of lumbar or lumbosacral intervertebral disc    Relevant Medications    oxyCODONE-acetaminophen (PERCOCET) 5-325 MG per tablet    Chronic, continuous use of opioids    Intractable migraine with aura without status migrainosus    Relevant Medications    oxyCODONE-acetaminophen (PERCOCET) 5-325 MG per tablet    Encounter for chronic pain management    Chronic migraine without aura without status migrainosus, not intractable    Relevant Medications    oxyCODONE-acetaminophen (PERCOCET) 5-325 MG per tablet    Neuropathy (HCC)      Other Visit Diagnoses     Degenerative cervical disc        Relevant Medications    oxyCODONE-acetaminophen (PERCOCET) 5-325 MG per tablet    DDD (degenerative disc disease), lumbar        Relevant Medications    oxyCODONE-acetaminophen (PERCOCET) 5-325 MG per tablet    Encounter for medication monitoring                Treatment Plan:  DISCUSSION: Treatment options discussed with patient and all questions answered to patient's satisfaction.       [x] Ill effects of being on chronic pain medications such as sleep disturbances, respiratory depression, hormonal changes, withdrawal symptoms,  chronic opioid dependence and tolerance as

## 2018-03-12 DIAGNOSIS — K21.00 GASTROESOPHAGEAL REFLUX DISEASE WITH ESOPHAGITIS: ICD-10-CM

## 2018-03-12 RX ORDER — RANITIDINE 150 MG/1
TABLET ORAL
Qty: 180 TABLET | Refills: 0 | Status: SHIPPED | OUTPATIENT
Start: 2018-03-12 | End: 2018-06-08 | Stop reason: SDUPTHER

## 2018-03-12 NOTE — TELEPHONE ENCOUNTER
Please Approve or Refuse. Next Visit Date:  Visit date not found    Hemoglobin A1C (%)   Date Value   01/21/2018 6.6 (H)   07/07/2017 6.9 (H)   06/01/2017 6.4 (H)             ( goal A1C is < 7)   Microalb/Crt.  Ratio (mcg/mg creat)   Date Value   06/01/2017 35 (H)     LDL Cholesterol (mg/dL)   Date Value   01/21/2018 46       (goal LDL is <100)   AST (U/L)   Date Value   10/19/2017 14     ALT (U/L)   Date Value   10/19/2017 15     BUN (mg/dL)   Date Value   02/06/2018 27 (H)     BP Readings from Last 3 Encounters:   03/05/18 (!) 111/53   02/14/18 126/69   02/06/18 (!) 118/49          (goal 120/80)        Patient Active Problem List:     Chronic pain of left knee     Type 2 diabetes mellitus with diabetic polyneuropathy, with long-term current use of insulin (HCC)     COPD (chronic obstructive pulmonary disease)     Nonintractable migraine, unspecified migraine type     Pulmonary emphysema (HCC)     PCB (post coital bleeding)     CAD (coronary artery disease)     Anxiety     Degeneration of lumbar or lumbosacral intervertebral disc     Bilateral leg pain     Chronic, continuous use of opioids     DDD (degenerative disc disease), cervical     Osteoarthritis of cervical spine     Occipital neuralgia     GERD (gastroesophageal reflux disease)     HTN (hypertension), benign     Hyperlipidemia with target LDL less than 100     Insomnia     Lumbosacral spondylosis without myelopathy     Sacroiliitis, not elsewhere classified ([address]     TIA (transient ischemic attack)     Carpal tunnel syndrome     Mixed stress and urge urinary incontinence     Bilateral low back pain with sciatica     Intractable migraine with aura without status migrainosus     Encounter for chronic pain management     Spondylarthrosis     Chronic obstructive pulmonary disease (HCC)     Anxiety and depression     Sleep disturbance     Chronic migraine without aura without status migrainosus, not intractable     Pulmonary embolism ([address]     Obesity, morbid, BMI 40.0-49.9 (HCC)     Lumbar radiculopathy, chronic     Nodule of right lung     Hypoglycemia     Neuropathy (HCC)     Costochondritis     Dyspepsia     HIGINIO on CPAP     Acute on chronic respiratory failure with hypoxia ([facility]

## 2018-03-27 ENCOUNTER — TELEPHONE (OUTPATIENT)
Dept: FAMILY MEDICINE CLINIC | Age: 68
End: 2018-03-27

## 2018-03-27 DIAGNOSIS — Z79.4 TYPE 2 DIABETES MELLITUS WITH DIABETIC POLYNEUROPATHY, WITH LONG-TERM CURRENT USE OF INSULIN (HCC): Primary | Chronic | ICD-10-CM

## 2018-03-27 DIAGNOSIS — E11.42 TYPE 2 DIABETES MELLITUS WITH DIABETIC POLYNEUROPATHY, WITH LONG-TERM CURRENT USE OF INSULIN (HCC): Primary | Chronic | ICD-10-CM

## 2018-03-27 NOTE — TELEPHONE ENCOUNTER
New glu meter ordered. Confirm how much insulin pt is taking , she is on two different doses, lantus 45 and 35 U,  She can cut down her evening dose of insulin by 5 Units .

## 2018-03-28 ENCOUNTER — TELEPHONE (OUTPATIENT)
Dept: FAMILY MEDICINE CLINIC | Age: 68
End: 2018-03-28

## 2018-04-02 ENCOUNTER — HOSPITAL ENCOUNTER (OUTPATIENT)
Dept: PAIN MANAGEMENT | Age: 68
Discharge: HOME | End: 2018-04-02
Payer: COMMERCIAL

## 2018-04-02 DIAGNOSIS — M47.817 LUMBOSACRAL SPONDYLOSIS WITHOUT MYELOPATHY: Primary | Chronic | ICD-10-CM

## 2018-04-02 DIAGNOSIS — M54.81 BILATERAL OCCIPITAL NEURALGIA: ICD-10-CM

## 2018-04-02 DIAGNOSIS — M50.30 DDD (DEGENERATIVE DISC DISEASE), CERVICAL: Chronic | ICD-10-CM

## 2018-04-02 DIAGNOSIS — M51.37 DEGENERATION OF LUMBAR OR LUMBOSACRAL INTERVERTEBRAL DISC: ICD-10-CM

## 2018-04-02 DIAGNOSIS — G89.29 ENCOUNTER FOR CHRONIC PAIN MANAGEMENT: ICD-10-CM

## 2018-04-02 DIAGNOSIS — M51.36 DDD (DEGENERATIVE DISC DISEASE), LUMBAR: ICD-10-CM

## 2018-04-02 PROCEDURE — 99213 OFFICE O/P EST LOW 20 MIN: CPT | Performed by: NURSE PRACTITIONER

## 2018-04-02 PROCEDURE — 99213 OFFICE O/P EST LOW 20 MIN: CPT

## 2018-04-02 RX ORDER — OXYCODONE HYDROCHLORIDE AND ACETAMINOPHEN 5; 325 MG/1; MG/1
1 TABLET ORAL EVERY 6 HOURS PRN
Qty: 100 TABLET | Refills: 0 | Status: SHIPPED | OUTPATIENT
Start: 2018-04-02 | End: 2018-05-02

## 2018-04-02 ASSESSMENT — ENCOUNTER SYMPTOMS
BACK PAIN: 1
COUGH: 0
CONSTIPATION: 0
SHORTNESS OF BREATH: 0

## 2018-04-06 ENCOUNTER — TELEPHONE (OUTPATIENT)
Dept: FAMILY MEDICINE CLINIC | Age: 68
End: 2018-04-06

## 2018-04-09 RX ORDER — CARVEDILOL 3.12 MG/1
TABLET ORAL
Qty: 60 TABLET | Refills: 0 | Status: SHIPPED | OUTPATIENT
Start: 2018-04-09 | End: 2018-05-04 | Stop reason: SDUPTHER

## 2018-05-04 ENCOUNTER — HOSPITAL ENCOUNTER (OUTPATIENT)
Dept: PAIN MANAGEMENT | Age: 68
Discharge: HOME | End: 2018-05-04
Payer: MEDICARE

## 2018-05-04 VITALS
TEMPERATURE: 97.9 F | DIASTOLIC BLOOD PRESSURE: 62 MMHG | OXYGEN SATURATION: 94 % | BODY MASS INDEX: 43.61 KG/M2 | WEIGHT: 237 LBS | SYSTOLIC BLOOD PRESSURE: 120 MMHG | HEIGHT: 62 IN | HEART RATE: 67 BPM | RESPIRATION RATE: 20 BRPM

## 2018-05-04 DIAGNOSIS — M50.30 DDD (DEGENERATIVE DISC DISEASE), CERVICAL: ICD-10-CM

## 2018-05-04 DIAGNOSIS — M51.37 DEGENERATION OF LUMBAR OR LUMBOSACRAL INTERVERTEBRAL DISC: ICD-10-CM

## 2018-05-04 DIAGNOSIS — M50.30 DEGENERATIVE CERVICAL DISC: ICD-10-CM

## 2018-05-04 DIAGNOSIS — G89.29 ENCOUNTER FOR CHRONIC PAIN MANAGEMENT: ICD-10-CM

## 2018-05-04 DIAGNOSIS — M54.81 BILATERAL OCCIPITAL NEURALGIA: ICD-10-CM

## 2018-05-04 DIAGNOSIS — M46.1 SACROILIITIS, NOT ELSEWHERE CLASSIFIED (HCC): ICD-10-CM

## 2018-05-04 DIAGNOSIS — M47.812 OSTEOARTHRITIS OF CERVICAL SPINE, UNSPECIFIED SPINAL OSTEOARTHRITIS COMPLICATION STATUS: ICD-10-CM

## 2018-05-04 DIAGNOSIS — F11.90 CHRONIC, CONTINUOUS USE OF OPIOIDS: ICD-10-CM

## 2018-05-04 DIAGNOSIS — Z51.81 ENCOUNTER FOR MEDICATION MONITORING: ICD-10-CM

## 2018-05-04 DIAGNOSIS — E66.01 OBESITY, MORBID, BMI 40.0-49.9 (HCC): ICD-10-CM

## 2018-05-04 DIAGNOSIS — M54.16 LUMBAR RADICULOPATHY, CHRONIC: ICD-10-CM

## 2018-05-04 DIAGNOSIS — M51.36 DDD (DEGENERATIVE DISC DISEASE), LUMBAR: ICD-10-CM

## 2018-05-04 DIAGNOSIS — Z01.812 PRE-PROCEDURE LAB EXAM: ICD-10-CM

## 2018-05-04 DIAGNOSIS — M47.817 LUMBOSACRAL SPONDYLOSIS WITHOUT MYELOPATHY: Primary | ICD-10-CM

## 2018-05-04 DIAGNOSIS — G43.709 CHRONIC MIGRAINE WITHOUT AURA WITHOUT STATUS MIGRAINOSUS, NOT INTRACTABLE: ICD-10-CM

## 2018-05-04 PROCEDURE — 99213 OFFICE O/P EST LOW 20 MIN: CPT | Performed by: NURSE PRACTITIONER

## 2018-05-04 PROCEDURE — 99213 OFFICE O/P EST LOW 20 MIN: CPT

## 2018-05-04 RX ORDER — OXYCODONE HYDROCHLORIDE AND ACETAMINOPHEN 5; 325 MG/1; MG/1
1 TABLET ORAL EVERY 6 HOURS PRN
Qty: 100 TABLET | Refills: 0 | Status: SHIPPED | OUTPATIENT
Start: 2018-05-04 | End: 2018-06-08 | Stop reason: SDUPTHER

## 2018-05-04 ASSESSMENT — ENCOUNTER SYMPTOMS
ROS SKIN COMMENTS: FACE
BACK PAIN: 1
GASTROINTESTINAL NEGATIVE: 1

## 2018-05-08 ENCOUNTER — TELEPHONE (OUTPATIENT)
Dept: FAMILY MEDICINE CLINIC | Age: 68
End: 2018-05-08
Payer: MEDICARE

## 2018-05-08 DIAGNOSIS — Z79.4 TYPE 2 DIABETES MELLITUS WITH DIABETIC POLYNEUROPATHY, WITH LONG-TERM CURRENT USE OF INSULIN (HCC): Primary | Chronic | ICD-10-CM

## 2018-05-08 DIAGNOSIS — M47.812 OSTEOARTHRITIS OF CERVICAL SPINE, UNSPECIFIED SPINAL OSTEOARTHRITIS COMPLICATION STATUS: Chronic | ICD-10-CM

## 2018-05-08 DIAGNOSIS — M50.30 DDD (DEGENERATIVE DISC DISEASE), CERVICAL: Chronic | ICD-10-CM

## 2018-05-08 DIAGNOSIS — E11.42 TYPE 2 DIABETES MELLITUS WITH DIABETIC POLYNEUROPATHY, WITH LONG-TERM CURRENT USE OF INSULIN (HCC): Primary | Chronic | ICD-10-CM

## 2018-05-08 DIAGNOSIS — J44.1 CHRONIC OBSTRUCTIVE PULMONARY DISEASE WITH ACUTE EXACERBATION (HCC): ICD-10-CM

## 2018-05-08 PROCEDURE — G0179 MD RECERTIFICATION HHA PT: HCPCS | Performed by: FAMILY MEDICINE

## 2018-05-13 ENCOUNTER — TELEPHONE (OUTPATIENT)
Dept: FAMILY MEDICINE CLINIC | Age: 68
End: 2018-05-13

## 2018-05-21 ENCOUNTER — TELEPHONE (OUTPATIENT)
Dept: PAIN MANAGEMENT | Age: 68
End: 2018-05-21

## 2018-05-25 ENCOUNTER — HOSPITAL ENCOUNTER (OUTPATIENT)
Dept: PAIN MANAGEMENT | Age: 68
Discharge: HOME | End: 2018-05-25
Payer: MEDICARE

## 2018-05-25 DIAGNOSIS — M47.817 LUMBOSACRAL SPONDYLOSIS WITHOUT MYELOPATHY: ICD-10-CM

## 2018-05-25 DIAGNOSIS — M51.37 DEGENERATION OF LUMBAR OR LUMBOSACRAL INTERVERTEBRAL DISC: ICD-10-CM

## 2018-05-25 DIAGNOSIS — M54.16 LUMBAR RADICULOPATHY, CHRONIC: Primary | ICD-10-CM

## 2018-06-04 ENCOUNTER — HOSPITAL ENCOUNTER (EMERGENCY)
Age: 68
Discharge: HOME | End: 2018-06-04
Attending: EMERGENCY MEDICINE
Payer: MEDICARE

## 2018-06-04 ENCOUNTER — APPOINTMENT (OUTPATIENT)
Dept: GENERAL RADIOLOGY | Age: 68
End: 2018-06-04
Payer: MEDICARE

## 2018-06-04 ENCOUNTER — HOSPITAL ENCOUNTER (OUTPATIENT)
Dept: PAIN MANAGEMENT | Age: 68
Discharge: HOME | End: 2018-06-04
Payer: MEDICARE

## 2018-06-04 VITALS
RESPIRATION RATE: 20 BRPM | WEIGHT: 234 LBS | TEMPERATURE: 97.2 F | SYSTOLIC BLOOD PRESSURE: 100 MMHG | HEIGHT: 61 IN | HEART RATE: 56 BPM | BODY MASS INDEX: 44.18 KG/M2 | OXYGEN SATURATION: 95 % | DIASTOLIC BLOOD PRESSURE: 61 MMHG

## 2018-06-04 DIAGNOSIS — M54.16 LUMBAR RADICULOPATHY, CHRONIC: Primary | ICD-10-CM

## 2018-06-04 DIAGNOSIS — M54.50 CHRONIC MIDLINE LOW BACK PAIN WITHOUT SCIATICA: Primary | ICD-10-CM

## 2018-06-04 DIAGNOSIS — G89.29 CHRONIC MIDLINE LOW BACK PAIN WITHOUT SCIATICA: Primary | ICD-10-CM

## 2018-06-04 DIAGNOSIS — M51.37 DEGENERATION OF LUMBAR OR LUMBOSACRAL INTERVERTEBRAL DISC: ICD-10-CM

## 2018-06-04 DIAGNOSIS — M47.817 LUMBOSACRAL SPONDYLOSIS WITHOUT MYELOPATHY: ICD-10-CM

## 2018-06-04 PROCEDURE — 6370000000 HC RX 637 (ALT 250 FOR IP): Performed by: NURSE PRACTITIONER

## 2018-06-04 PROCEDURE — 72100 X-RAY EXAM L-S SPINE 2/3 VWS: CPT

## 2018-06-04 PROCEDURE — 99284 EMERGENCY DEPT VISIT MOD MDM: CPT

## 2018-06-04 RX ORDER — CYCLOBENZAPRINE HCL 5 MG
5 TABLET ORAL 2 TIMES DAILY PRN
Qty: 14 TABLET | Refills: 0 | Status: SHIPPED | OUTPATIENT
Start: 2018-06-04 | End: 2018-06-14

## 2018-06-04 RX ORDER — OXYCODONE HYDROCHLORIDE AND ACETAMINOPHEN 5; 325 MG/1; MG/1
1 TABLET ORAL ONCE
Status: COMPLETED | OUTPATIENT
Start: 2018-06-04 | End: 2018-06-04

## 2018-06-04 RX ORDER — CYCLOBENZAPRINE HCL 10 MG
5 TABLET ORAL ONCE
Status: COMPLETED | OUTPATIENT
Start: 2018-06-04 | End: 2018-06-04

## 2018-06-04 RX ADMIN — OXYCODONE HYDROCHLORIDE AND ACETAMINOPHEN 1 TABLET: 5; 325 TABLET ORAL at 11:38

## 2018-06-04 RX ADMIN — CYCLOBENZAPRINE HYDROCHLORIDE 5 MG: 10 TABLET, FILM COATED ORAL at 11:38

## 2018-06-04 ASSESSMENT — PAIN DESCRIPTION - ONSET
ONSET: ON-GOING
ONSET: ON-GOING

## 2018-06-04 ASSESSMENT — ENCOUNTER SYMPTOMS
DIARRHEA: 0
CONSTIPATION: 0
BACK PAIN: 1
VOMITING: 0
ABDOMINAL PAIN: 0
NAUSEA: 0

## 2018-06-04 ASSESSMENT — PAIN SCALES - GENERAL
PAINLEVEL_OUTOF10: 8
PAINLEVEL_OUTOF10: 8
PAINLEVEL_OUTOF10: 5

## 2018-06-04 ASSESSMENT — PAIN DESCRIPTION - FREQUENCY: FREQUENCY: CONTINUOUS

## 2018-06-04 ASSESSMENT — PAIN DESCRIPTION - PAIN TYPE
TYPE: CHRONIC PAIN
TYPE: CHRONIC PAIN

## 2018-06-04 ASSESSMENT — PAIN DESCRIPTION - DESCRIPTORS: DESCRIPTORS: CONSTANT;ACHING

## 2018-06-04 ASSESSMENT — PAIN DESCRIPTION - LOCATION: LOCATION: BACK

## 2018-06-08 ENCOUNTER — HOSPITAL ENCOUNTER (OUTPATIENT)
Dept: PAIN MANAGEMENT | Age: 68
Discharge: HOME | End: 2018-06-08
Payer: MEDICARE

## 2018-06-08 VITALS
WEIGHT: 234 LBS | HEIGHT: 61 IN | SYSTOLIC BLOOD PRESSURE: 126 MMHG | BODY MASS INDEX: 44.18 KG/M2 | DIASTOLIC BLOOD PRESSURE: 63 MMHG | TEMPERATURE: 97.4 F | HEART RATE: 62 BPM | OXYGEN SATURATION: 94 % | RESPIRATION RATE: 20 BRPM

## 2018-06-08 DIAGNOSIS — G89.29 ENCOUNTER FOR CHRONIC PAIN MANAGEMENT: ICD-10-CM

## 2018-06-08 DIAGNOSIS — M51.37 DEGENERATION OF LUMBAR OR LUMBOSACRAL INTERVERTEBRAL DISC: ICD-10-CM

## 2018-06-08 DIAGNOSIS — G89.29 CHRONIC PAIN OF LEFT KNEE: ICD-10-CM

## 2018-06-08 DIAGNOSIS — M47.812 OSTEOARTHRITIS OF CERVICAL SPINE, UNSPECIFIED SPINAL OSTEOARTHRITIS COMPLICATION STATUS: ICD-10-CM

## 2018-06-08 DIAGNOSIS — M46.1 SACROILIITIS, NOT ELSEWHERE CLASSIFIED (HCC): ICD-10-CM

## 2018-06-08 DIAGNOSIS — F11.90 CHRONIC, CONTINUOUS USE OF OPIOIDS: ICD-10-CM

## 2018-06-08 DIAGNOSIS — G43.709 CHRONIC MIGRAINE WITHOUT AURA WITHOUT STATUS MIGRAINOSUS, NOT INTRACTABLE: ICD-10-CM

## 2018-06-08 DIAGNOSIS — G43.119 INTRACTABLE MIGRAINE WITH AURA WITHOUT STATUS MIGRAINOSUS: ICD-10-CM

## 2018-06-08 DIAGNOSIS — M54.81 BILATERAL OCCIPITAL NEURALGIA: ICD-10-CM

## 2018-06-08 DIAGNOSIS — Z01.812 PRE-PROCEDURE LAB EXAM: ICD-10-CM

## 2018-06-08 DIAGNOSIS — M50.30 DDD (DEGENERATIVE DISC DISEASE), CERVICAL: ICD-10-CM

## 2018-06-08 DIAGNOSIS — M47.817 LUMBOSACRAL SPONDYLOSIS WITHOUT MYELOPATHY: ICD-10-CM

## 2018-06-08 DIAGNOSIS — M50.30 DEGENERATIVE CERVICAL DISC: ICD-10-CM

## 2018-06-08 DIAGNOSIS — E66.01 OBESITY, MORBID, BMI 40.0-49.9 (HCC): ICD-10-CM

## 2018-06-08 DIAGNOSIS — M54.16 LUMBAR RADICULOPATHY, CHRONIC: Primary | ICD-10-CM

## 2018-06-08 DIAGNOSIS — G62.9 NEUROPATHY: ICD-10-CM

## 2018-06-08 DIAGNOSIS — Z51.81 ENCOUNTER FOR MEDICATION MONITORING: ICD-10-CM

## 2018-06-08 DIAGNOSIS — M25.562 CHRONIC PAIN OF LEFT KNEE: ICD-10-CM

## 2018-06-08 DIAGNOSIS — M51.36 DDD (DEGENERATIVE DISC DISEASE), LUMBAR: ICD-10-CM

## 2018-06-08 PROCEDURE — 99213 OFFICE O/P EST LOW 20 MIN: CPT | Performed by: NURSE PRACTITIONER

## 2018-06-08 PROCEDURE — 80307 DRUG TEST PRSMV CHEM ANLYZR: CPT

## 2018-06-08 PROCEDURE — 99213 OFFICE O/P EST LOW 20 MIN: CPT

## 2018-06-08 RX ORDER — OXYCODONE HYDROCHLORIDE AND ACETAMINOPHEN 5; 325 MG/1; MG/1
1 TABLET ORAL SEE ADMIN INSTRUCTIONS
Qty: 100 TABLET | Refills: 0 | Status: SHIPPED | OUTPATIENT
Start: 2018-06-08 | End: 2018-07-02 | Stop reason: SDUPTHER

## 2018-06-08 ASSESSMENT — ENCOUNTER SYMPTOMS
HEARTBURN: 1
BACK PAIN: 1
SHORTNESS OF BREATH: 1

## 2018-06-13 LAB
6-ACETYLMORPHINE, UR: NOT DETECTED
7-AMINOCLONAZEPAM, URINE: NOT DETECTED
ALPHA-OH-ALPRAZ, URINE: NOT DETECTED
ALPRAZOLAM, URINE: NOT DETECTED
AMPHETAMINES, URINE: NOT DETECTED
BARBITURATES, URINE: NOT DETECTED
BENZOYLECGONINE, UR: NOT DETECTED
BUPRENORPHINE URINE: NOT DETECTED
CARISOPRODOL, UR: NOT DETECTED
CLONAZEPAM, URINE: NOT DETECTED
CODEINE, URINE: NOT DETECTED
CREATININE URINE: 197.8 MG/DL (ref 20–400)
DIAZEPAM, URINE: NOT DETECTED
DRUGS EXPECTED, UR: NORMAL
EER HI RES INTERP UR: NORMAL
ETHYL GLUCURONIDE UR: NOT DETECTED
FENTANYL URINE: NOT DETECTED
HYDROCODONE, URINE: NOT DETECTED
HYDROMORPHONE, URINE: NOT DETECTED
LORAZEPAM, URINE: NOT DETECTED
MARIJUANA METAB, UR: NOT DETECTED
MDA, UR: NOT DETECTED
MDEA, EVE, UR: NOT DETECTED
MDMA URINE: NOT DETECTED
MEPERIDINE METAB, UR: NOT DETECTED
METHADONE, URINE: NOT DETECTED
METHAMPHETAMINE, URINE: NOT DETECTED
METHYLPHENIDATE: NOT DETECTED
MIDAZOLAM, URINE: NOT DETECTED
MORPHINE URINE: NOT DETECTED
NORBUPRENORPHINE, URINE: NOT DETECTED
NORDIAZEPAM, URINE: NOT DETECTED
NORFENTANYL, URINE: NOT DETECTED
NORHYDROCODONE, URINE: NOT DETECTED
NOROXYCODONE, URINE: NOT DETECTED
NOROXYMORPHONE, URINE: NOT DETECTED
OXAZEPAM, URINE: NOT DETECTED
OXYCODONE URINE: NOT DETECTED
OXYMORPHONE, URINE: NOT DETECTED
PAIN MANAGEMENT DRUG PANEL INTERP, URINE: NORMAL
PAIN MGT DRUG PANEL, HI RES, UR: NORMAL
PCP,URINE: NOT DETECTED
PHENTERMINE, UR: NOT DETECTED
PROPOXYPHENE, URINE: NOT DETECTED
TAPENTADOL, URINE: NOT DETECTED
TAPENTADOL-O-SULFATE, URINE: NOT DETECTED
TEMAZEPAM, URINE: NOT DETECTED
TRAMADOL, URINE: NOT DETECTED
ZOLPIDEM, URINE: NOT DETECTED

## 2018-07-01 ENCOUNTER — HOSPITAL ENCOUNTER (OUTPATIENT)
Age: 68
Discharge: HOME | End: 2018-07-01
Payer: MEDICARE

## 2018-07-01 DIAGNOSIS — Z01.812 PRE-PROCEDURE LAB EXAM: ICD-10-CM

## 2018-07-01 LAB
COLLAGEN ADENOSINE-5'-DIPHOSPHATE (ADP) TIME: 64 SEC (ref 67–112)
COLLAGEN EPINEPHRINE TIME: 84 SEC (ref 85–172)
PLATELET FUNCTION INTERP: ABNORMAL

## 2018-07-01 PROCEDURE — 36415 COLL VENOUS BLD VENIPUNCTURE: CPT

## 2018-07-01 PROCEDURE — 85576 BLOOD PLATELET AGGREGATION: CPT

## 2018-07-02 ENCOUNTER — HOSPITAL ENCOUNTER (OUTPATIENT)
Dept: PAIN MANAGEMENT | Age: 68
Discharge: HOME | End: 2018-07-02
Payer: MEDICARE

## 2018-07-02 ENCOUNTER — HOSPITAL ENCOUNTER (OUTPATIENT)
Dept: GENERAL RADIOLOGY | Age: 68
Discharge: HOME | End: 2018-07-04
Payer: MEDICARE

## 2018-07-02 VITALS
RESPIRATION RATE: 16 BRPM | HEART RATE: 76 BPM | SYSTOLIC BLOOD PRESSURE: 154 MMHG | HEIGHT: 61 IN | TEMPERATURE: 97.9 F | DIASTOLIC BLOOD PRESSURE: 80 MMHG | BODY MASS INDEX: 44.18 KG/M2 | OXYGEN SATURATION: 97 % | WEIGHT: 234 LBS

## 2018-07-02 DIAGNOSIS — M51.36 DDD (DEGENERATIVE DISC DISEASE), LUMBAR: ICD-10-CM

## 2018-07-02 DIAGNOSIS — M54.16 LUMBAR RADICULOPATHY, CHRONIC: ICD-10-CM

## 2018-07-02 DIAGNOSIS — M47.817 LUMBOSACRAL SPONDYLOSIS WITHOUT MYELOPATHY: ICD-10-CM

## 2018-07-02 DIAGNOSIS — M54.16 LUMBAR RADICULOPATHY, CHRONIC: Primary | ICD-10-CM

## 2018-07-02 PROCEDURE — 62327 NJX INTERLAMINAR LMBR/SAC: CPT

## 2018-07-02 PROCEDURE — 6360000002 HC RX W HCPCS

## 2018-07-02 PROCEDURE — 62323 NJX INTERLAMINAR LMBR/SAC: CPT

## 2018-07-02 PROCEDURE — 6360000002 HC RX W HCPCS: Performed by: PAIN MEDICINE

## 2018-07-02 PROCEDURE — 3209999900 FLUORO FOR SURGICAL PROCEDURES

## 2018-07-02 PROCEDURE — 62323 NJX INTERLAMINAR LMBR/SAC: CPT | Performed by: PAIN MEDICINE

## 2018-07-02 RX ORDER — OXYCODONE HYDROCHLORIDE AND ACETAMINOPHEN 5; 325 MG/1; MG/1
1 TABLET ORAL SEE ADMIN INSTRUCTIONS
Qty: 100 TABLET | Refills: 0 | Status: SHIPPED | OUTPATIENT
Start: 2018-07-08 | End: 2018-07-30 | Stop reason: SDUPTHER

## 2018-07-02 RX ORDER — MIDAZOLAM HYDROCHLORIDE 1 MG/ML
INJECTION INTRAMUSCULAR; INTRAVENOUS
Status: COMPLETED | OUTPATIENT
Start: 2018-07-02 | End: 2018-07-02

## 2018-07-02 RX ADMIN — MIDAZOLAM 1 MG: 1 INJECTION INTRAMUSCULAR; INTRAVENOUS at 11:22

## 2018-07-02 ASSESSMENT — PAIN - FUNCTIONAL ASSESSMENT
PAIN_FUNCTIONAL_ASSESSMENT: 0-10

## 2018-07-02 ASSESSMENT — PAIN DESCRIPTION - DESCRIPTORS: DESCRIPTORS: ACHING;CONSTANT;JABBING;NAGGING;SHARP

## 2018-07-02 NOTE — PROGRESS NOTES
Discharge criteria met. Patient alert and oriented x3  Post procedure dressing dry and intact. Sensory and motor function intact as per pre-procedure. Instructions and follow up reviewed with pt at patient at discharge. Patient discharged per wheelchair with home oxygen on @  400 [id number].  Lo

## 2018-07-03 ENCOUNTER — TELEPHONE (OUTPATIENT)
Dept: PAIN MANAGEMENT | Age: 68
End: 2018-07-03

## 2018-07-13 ENCOUNTER — TELEPHONE (OUTPATIENT)
Dept: FAMILY MEDICINE CLINIC | Age: 68
End: 2018-07-13

## 2018-07-16 ENCOUNTER — OFFICE VISIT (OUTPATIENT)
Dept: FAMILY MEDICINE CLINIC | Age: 68
End: 2018-07-16
Payer: MEDICARE

## 2018-07-16 ENCOUNTER — HOSPITAL ENCOUNTER (OUTPATIENT)
Age: 68
Setting detail: SPECIMEN
Discharge: HOME | End: 2018-07-16
Payer: MEDICARE

## 2018-07-16 VITALS
TEMPERATURE: 98.9 F | HEIGHT: 62 IN | HEART RATE: 72 BPM | BODY MASS INDEX: 43.28 KG/M2 | OXYGEN SATURATION: 98 % | DIASTOLIC BLOOD PRESSURE: 74 MMHG | WEIGHT: 235.2 LBS | SYSTOLIC BLOOD PRESSURE: 132 MMHG

## 2018-07-16 DIAGNOSIS — N89.8 VAGINAL DISCHARGE: ICD-10-CM

## 2018-07-16 DIAGNOSIS — N39.0 RECURRENT UTI: ICD-10-CM

## 2018-07-16 DIAGNOSIS — E66.01 MORBID OBESITY WITH BMI OF 40.0-44.9, ADULT (HCC): ICD-10-CM

## 2018-07-16 DIAGNOSIS — G47.33 OSA ON CPAP: ICD-10-CM

## 2018-07-16 DIAGNOSIS — R82.90 ABNORMAL URINALYSIS: ICD-10-CM

## 2018-07-16 DIAGNOSIS — K21.00 GASTROESOPHAGEAL REFLUX DISEASE WITH ESOPHAGITIS: ICD-10-CM

## 2018-07-16 DIAGNOSIS — Z99.89 OSA ON CPAP: ICD-10-CM

## 2018-07-16 DIAGNOSIS — J44.9 ASTHMA WITH COPD (HCC): Primary | ICD-10-CM

## 2018-07-16 DIAGNOSIS — N30.00 ACUTE CYSTITIS WITHOUT HEMATURIA: ICD-10-CM

## 2018-07-16 PROBLEM — J44.89 ASTHMA WITH COPD: Status: ACTIVE | Noted: 2018-07-16

## 2018-07-16 LAB
BILIRUBIN, POC: NORMAL
BLOOD URINE, POC: NORMAL
CLARITY, POC: NORMAL
COLOR, POC: YELLOW
DIRECT EXAM: NORMAL
GLUCOSE URINE, POC: NEGATIVE
KETONES, POC: NORMAL
LEUKOCYTE EST, POC: NORMAL
Lab: NORMAL
NITRITE, POC: NEGATIVE
PH, POC: 5
PROTEIN, POC: NEGATIVE
SPECIFIC GRAVITY, POC: 1.02
SPECIMEN DESCRIPTION: NORMAL
STATUS: NORMAL
UROBILINOGEN, POC: NEGATIVE

## 2018-07-16 PROCEDURE — 81003 URINALYSIS AUTO W/O SCOPE: CPT | Performed by: FAMILY MEDICINE

## 2018-07-16 PROCEDURE — 99214 OFFICE O/P EST MOD 30 MIN: CPT | Performed by: FAMILY MEDICINE

## 2018-07-16 RX ORDER — PANTOPRAZOLE SODIUM 40 MG/1
40 TABLET, DELAYED RELEASE ORAL 2 TIMES DAILY
Qty: 60 TABLET | Refills: 3 | Status: SHIPPED | OUTPATIENT
Start: 2018-07-16 | End: 2018-11-06 | Stop reason: SDUPTHER

## 2018-07-16 RX ORDER — NITROFURANTOIN 25; 75 MG/1; MG/1
100 CAPSULE ORAL 2 TIMES DAILY
Qty: 10 CAPSULE | Refills: 0 | Status: ON HOLD | OUTPATIENT
Start: 2018-07-16 | End: 2018-07-27 | Stop reason: ALTCHOICE

## 2018-07-16 ASSESSMENT — ENCOUNTER SYMPTOMS
DIARRHEA: 0
COUGH: 1
NAUSEA: 0
VOMITING: 0
BACK PAIN: 1
ABDOMINAL DISTENTION: 0
SHORTNESS OF BREATH: 1
CONSTIPATION: 0
CHEST TIGHTNESS: 0
WHEEZING: 0
APNEA: 1
ABDOMINAL PAIN: 1

## 2018-07-16 NOTE — PROGRESS NOTES
Chief Complaint   Patient presents with    COPD    Back Pain    Gastroesophageal Reflux    Vaginal Pain    Vaginal Discharge         Tete Height  here today for follow up on chronic medical problems, go over labs and/or diagnostic studies, and medication refills. 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 She is on oxygen 2 L of oxygen constantly. Has chronic respiratory failure. Patient reports mild cough with light colored sputum on and off. She denies wheezing. She denies leg edema. She denies chest pain. Patient follows with Dr. Nae Medina. Patient tells me that she has emphysema and asthma per her pulmonologist. I updated the problem list    Quit smoking in 2000    Pulse ox is normal, she is on 2 L/min oxygen  at this time  SpO2 Readings from Last 4 Encounters:   07/16/18 98%   07/02/18 97%   06/08/18 94%   06/04/18 95%     Has chronic back pain. Intensity of pain is 7-8/10  She uses a walker to ambulate. Patient has chronic back pain and she goes to pain management. Has scooter. Wants another PAP smear with another GYN. She reports having an urinary infection, dysuria, sharp pains and burning in the perineal area. She thinks she might also have a bulge in the perineal area. She also reports vaginal discharge and vaginal pain, but not vaginal bleeding. Patient reports getting frequent UTIs. Urine dipstick shows positive for leukocytes, red blood cells, ketones, urobilinogen.    Results for POC orders placed in visit on 07/16/18   POCT Urinalysis No Micro (Auto)   Result Value Ref Range    Color, UA Yellow     Clarity, UA Cloudy     Glucose, UA POC Negative     Bilirubin, UA Large     Ketones, UA Large     Spec Grav, UA 1.020     Blood, UA POC Trace     pH, UA 5.0     Protein, UA POC Negative sucralfate (CARAFATE) 1 GM tablet Take 1 tablet by mouth 4 times daily 120 tablet 3    pantoprazole (PROTONIX) 40 MG tablet Take 1 tablet by mouth daily 180 tablet 3    ipratropium-albuterol (DUONEB) 0.5-2.5 (3) MG/3ML SOLN nebulizer solution Inhale 3 mLs into the lungs every 4 hours 360 mL 2    losartan (COZAAR) 25 MG tablet TAKE 1 TAB BY MOUTH ONCE A DAY 90 tablet 5    topiramate (TOPAMAX) 100 MG tablet Take 1 tablet by mouth nightly 90 tablet 1    atorvastatin (LIPITOR) 80 MG tablet TAKE 1 TAB BY MOUTH ONCE A DAY 90 tablet 3    Melatonin 10 MG TABS Take 10 mg by mouth nightly 90 tablet 3    furosemide (LASIX) 20 MG tablet TAKE 1 TABLET BY MOUTH AS NEEDED (LEG SWELLING) 30 tablet 3    citalopram (CELEXA) 40 MG tablet TAKE 1 TAB BY MOUTH ONCE A DAY 90 tablet 3    clopidogrel (PLAVIX) 75 MG tablet TAKE 1 TAB BY MOUTH ONCE A DAY 90 tablet 3    Compression Stockings MISC by Does not apply route Pressure between 20 - 25 . 1 each 0    insulin glargine (LANTUS SOLOSTAR) 100 UNIT/ML injection pen Inject 45 Units into the skin every morning (Patient taking differently: Inject 45 Units into the skin every morning 45 U in the morning and 35 U in the evening) 5 Pen 3    amLODIPine (NORVASC) 5 MG tablet TAKE 1 TAB BY MOUTH ONCE A DAY 90 tablet 0    Insulin Pen Needle 31G X 6 MM MISC 1 each by Does not apply route daily 100 each 3    isosorbide mononitrate (IMDUR) 30 MG extended release tablet TAKE 1 TABLET BY MOUTH DAILY 30 tablet 5    ferrous sulfate 325 (65 FE) MG tablet Take 325 mg by mouth daily (with breakfast)      ULTICARE SHORT PEN NEEDLES 31G X 8 MM MISC AS DIRECTED 100 each 5    docusate sodium (COLACE) 100 MG capsule Take 1 capsule by mouth 2 times daily Please use while taking Percocet 30 capsule 0    SYMBICORT 160-4.5 MCG/ACT AERO   2 puffs As needed for sob      magnesium oxide (MAG-OX) 400 MG tablet Take 400 mg by mouth 2 times daily.       OXYGEN Inhale 3 L into the lungs       nystatin (MYCOSTATIN) powder Apply 3 times daily. 3 Bottle 3     No current facility-administered medications for this visit. Social History     Social History    Marital status: Single     Spouse name: N/A    Number of children: N/A    Years of education: N/A     Occupational History    disabled N/A     Social History Main Topics    Smoking status: Former Smoker     Packs/day: 3.00     Years: 41.00     Types: Cigarettes     Quit date: 1/1/2000    Smokeless tobacco: Never Used      Comment: quit in 2000    Alcohol use No    Drug use: No    Sexual activity: Not Currently     Other Topics Concern    Not on file     Social History Narrative    No narrative on file     Counseling given: Yes                  -rest of complaints with corresponding details per ROS    The patient's past medical, surgical, social, and family history as well as her current medications and allergies were reviewed as documented in today's encounter. Review of Systems   Constitutional: Positive for fatigue. Negative for activity change, appetite change, chills, diaphoresis and fever. Respiratory: Positive for apnea (on CPAP), cough and shortness of breath. Negative for chest tightness and wheezing. On O2     Cardiovascular: Negative for chest pain, palpitations and leg swelling. Gastrointestinal: Positive for abdominal pain. Negative for abdominal distention, constipation, diarrhea, nausea and vomiting. GERD, she declines to cut down the dosage of pantoprazole   Genitourinary: Positive for dysuria, frequency, urgency and vaginal discharge. Negative for flank pain and vaginal bleeding. Urine incontinence   Musculoskeletal: Positive for back pain and gait problem. Ambulates with walker   Psychiatric/Behavioral: The patient is nervous/anxious.            -vital signs stable and within normal limits except Morbid obesity per BMI.    /74   Pulse 72   Temp 98.9 °F (37.2 °C) (Temporal)   Ht UA Cloudy     Glucose, UA POC Negative     Bilirubin, UA Large     Ketones, UA Large     Spec Grav, UA 1.020     Blood, UA POC Trace     pH, UA 5.0     Protein, UA POC Negative     Urobilinogen, UA Negative     Leukocytes, UA Large     Nitrite, UA Negative        ASSESSMENT AND PLAN      1. Asthma with COPD ([facility]  Stable  Continue current inhalersBreo Ellipta, Symbicort, oxygen 2 L/m, duo-nebs    Form for transplantation filled for patient     2. Gastroesophageal reflux disease with esophagitis  Not well controlled  Gets rebound GERD if not taking PPI BID  She is also on Carafate  She is agreeable for referral to GI     - pantoprazole (PROTONIX) 40 MG tablet; Take 1 tablet by mouth 2 times daily  Dispense: 60 tablet; Refill: 3  - Delonte Salter MD, [facility] [place]*    3. Acute cystitis without hematuria  - nitrofurantoin, macrocrystal-monohydrate, (MACROBID) 100 MG capsule; Take 1 capsule by mouth 2 times daily  Dispense: 10 capsule; Refill: 0    4. Vaginal discharge  - POCT Urinalysis No Micro (Auto)-abnormal  - Vaginitis DNA Probe; Future  - [address], [place], [address]*    5. Abnormal urinalysis  - Vaginitis DNA Probe; Future  - Urine Culture    6. Recurrent UTI  - nitrofurantoin, macrocrystal-monohydrate, (MACROBID) 100 MG capsule; Take 1 capsule by mouth 2 times daily  Dispense: 10 capsule; Refill: 0  - [address] [place], [address]*    7. HIGINIO on CPAP  Stable  Patient reports compliance with CPAP    8.  Morbid obesity with BMI of 40.0-44.9, adult ([facility]  Stable  attempt to lose weight with low carb diet, walk more, metformin and Topamax  Wt Readings from Last 3 Encounters:   07/16/18 235 lb 3.2 oz (106.7 kg)   07/02/18 234 lb (106.1 kg)   06/08/18 234 lb (106.1 kg)           Orders Placed This Encounter   Procedures    Vaginitis DNA Probe     Standing Status:   Future     Number of Occurrences:   1     Standing Expiration Date:   7/16/2019    Urine Culture     Order Reduction, DASH Eating Plan, Dietary Sodium Restriction, Increased Physical Activity, Patient In-home Blood Pressure Monitoring and No treatment change needed. Fall Risk 2/14/2018 1/4/2017 7/15/2015 2/5/2015   2 or more falls in past year? no no no no   Fall with injury in past year? no no no no     The patient does not have a history of falls. I did , complete a risk assessment for falls. A plan of care for falls in-office gait and balance testing performed using The Timed Up and Go Test was positive for increased falls risk, home safety tips provided      LDL controlled    Lab Results   Component Value Date    LDLCHOLESTEROL 46 01/21/2018    (goal LDL reduction with dx if diabetes is 50% LDL reduction)    Negative depression screening. PHQ Scores 2/14/2018 7/15/2015   PHQ2 Score 0 0   PHQ9 Score 0 0     Interpretation of Total Score Depression Severity: 1-4 = Minimal depression, 5-9 = Mild depression, 10-14 = Moderate depression, 15-19 = Moderately severe depression, 20-27 = Severe depression      The patient's past medical, surgical, social, and family history as well as her   current medications and allergies were reviewed as documented in today's encounter. Medications, labs, diagnostic studies, consultations and follow-up as documented in this encounter. Return in about 4 weeks (around 8/13/2018) for O2, COPD, WITH PCP. Patient was seen with total face to face time of  25 minutes. More than 50% of this visit was counseling and education. Future Appointments  Date Time Provider Clarence Mas   7/30/2018 10:20 AM VERONIKA De León - CNP [facility] None   8/14/2018 10:45 AM Dontae Bocanegra MD [id number]     This note was completed by using the assistance of a speech-recognition program. However, inadvertent computerized transcription errors may be present.  Although every effort was made to ensure accuracy, no guarantees can be provided that every mistake has been identified and

## 2018-07-16 NOTE — PATIENT INSTRUCTIONS
Patient Education        Chronic Obstructive Pulmonary Disease (COPD): Care Instructions  Your Care Instructions    Chronic obstructive pulmonary disease (COPD) is a general term for a group of lung diseases, including emphysema and chronic bronchitis. People with COPD have decreased airflow in and out of the lungs, which makes it hard to breathe. The airways also can get clogged with thick mucus. Cigarette smoking is a major cause of COPD. Although there is no cure for COPD, you can slow its progress. Following your treatment plan and taking care of yourself can help you feel better and live longer. Follow-up care is a key part of your treatment and safety. Be sure to make and go to all appointments, and call your doctor if you are having problems. It's also a good idea to know your test results and keep a list of the medicines you take. How can you care for yourself at home?   Staying healthy    · Do not smoke. This is the most important step you can take to prevent more damage to your lungs. If you need help quitting, talk to your doctor about stop-smoking programs and medicines. These can increase your chances of quitting for good.     · Avoid colds and flu. Get a pneumococcal vaccine shot. If you have had one before, ask your doctor whether you need a second dose. Get the flu vaccine every fall. If you must be around people with colds or the flu, wash your hands often.     · Avoid secondhand smoke, air pollution, and high altitudes. Also avoid cold, dry air and hot, humid air. Stay at home with your windows closed when air pollution is bad.    Medicines and oxygen therapy    · Take your medicines exactly as prescribed. Call your doctor if you think you are having a problem with your medicine.     · You may be taking medicines such as:  ¨ Bronchodilators. These help open your airways and make breathing easier.  Bronchodilators are either short-acting (work for 6 to 9 hours) or long-acting (work for 25   · Eat foods that contain protein so that you do not lose muscle mass.     · Talk with your doctor if you gain too much weight or if you lose weight without trying.    Mental health    · Talk to your family, friends, or a therapist about your feelings. It is normal to feel frightened, angry, hopeless, helpless, and even guilty. Talking openly about bad feelings can help you cope. If these feelings last, talk to your doctor. When should you call for help? Call 911 anytime you think you may need emergency care. For example, call if:    · You have severe trouble breathing.    Call your doctor now or seek immediate medical care if:    · You have new or worse trouble breathing.     · You cough up blood.     · You have a fever.    Watch closely for changes in your health, and be sure to contact your doctor if:    · You cough more deeply or more often, especially if you notice more mucus or a change in the color of your mucus.     · You have new or worse swelling in your legs or belly.     · You are not getting better as expected. Where can you learn more? Go to https://[employer]. org and sign in to your Windows[employer] account. Enter A206 in the BizSlate box to learn more about \"Chronic Obstructive Pulmonary Disease (COPD): Care Instructions. \"     If you do not have an account, please click on the \"Sign Up Now\" link. Current as of: December 6, 2017  Content Version: 11.6  © 8298-9672 [employer]. Care instructions adapted under license by [facility] ([facility]). If you have questions about a medical condition or this instruction, always ask your healthcare professional. [address] any warranty or liability for your use of this information.

## 2018-07-16 NOTE — PROGRESS NOTES
Addressed during office visit today, UA abnormal, continue treatment recommended during the office visit   Clemencia Wan for UTI given

## 2018-07-17 ENCOUNTER — TELEPHONE (OUTPATIENT)
Dept: FAMILY MEDICINE CLINIC | Age: 68
End: 2018-07-17

## 2018-07-17 DIAGNOSIS — M54.40 CHRONIC BILATERAL LOW BACK PAIN WITH SCIATICA, SCIATICA LATERALITY UNSPECIFIED: ICD-10-CM

## 2018-07-17 DIAGNOSIS — G89.29 CHRONIC BILATERAL LOW BACK PAIN WITH SCIATICA, SCIATICA LATERALITY UNSPECIFIED: ICD-10-CM

## 2018-07-17 DIAGNOSIS — M47.817 LUMBOSACRAL SPONDYLOSIS WITHOUT MYELOPATHY: Primary | Chronic | ICD-10-CM

## 2018-07-17 LAB
CULTURE: NORMAL
Lab: NORMAL
SPECIMEN DESCRIPTION: NORMAL
STATUS: NORMAL

## 2018-07-17 RX ORDER — TIZANIDINE 2 MG/1
2 TABLET ORAL EVERY 12 HOURS PRN
Qty: 60 TABLET | Refills: 0 | Status: SHIPPED | OUTPATIENT
Start: 2018-07-17 | End: 2019-03-07 | Stop reason: SDUPTHER

## 2018-07-17 NOTE — TELEPHONE ENCOUNTER
Patient is requesting refill on cyclobenzaprine (FLEXERIL) 5 MG tablet for muscle spasm , she said she needs something . She said she was given flexeril when she was in the hospital and worked good for her . Please advice  Next Visit Date:  Future Appointments  Date Time Provider Clarence Mas   7/30/2018 10:20 AM VERONIKA Wayne - CNP [facility] None   8/14/2018 10:45 AM Dionicio Pérez MD fp sc [address] Maintenance   Topic Date Due    DTaP/Tdap/Td vaccine (1 - Tdap) 12/24/1968    Shingles Vaccine (1 of 2 - 2 Dose Series) 12/24/1999    Diabetic retinal exam  07/13/2017    Diabetic foot exam  05/28/2018    Diabetic microalbuminuria test  06/01/2018    Flu vaccine (1) 09/01/2018    Breast cancer screen  11/18/2018    A1C test (Diabetic or Prediabetic)  01/21/2019    Lipid screen  01/21/2019    Potassium monitoring  02/06/2019    Creatinine monitoring  02/06/2019    Colon cancer screen colonoscopy  04/10/2020    DEXA (modify frequency per FRAX score)  Completed    Pneumococcal low/med risk  Completed    Hepatitis C screen  Completed       Hemoglobin A1C (%)   Date Value   01/21/2018 6.6 (H)   07/07/2017 6.9 (H)   06/01/2017 6.4 (H)             ( goal A1C is < 7)   Microalb/Crt.  Ratio (mcg/mg creat)   Date Value   06/01/2017 35 (H)     LDL Cholesterol (mg/dL)   Date Value   01/21/2018 46   06/01/2017 81       (goal LDL is <100)   AST (U/L)   Date Value   10/19/2017 14     ALT (U/L)   Date Value   10/19/2017 15     BUN (mg/dL)   Date Value   02/06/2018 27 (H)     BP Readings from Last 3 Encounters:   07/16/18 132/74   07/02/18 (!) 154/80   06/08/18 126/63          (goal 120/80)    All Future Testing planned in [facility]  Lab Frequency Next Occurrence   XR CERVICAL SPINE STANDARD Once 07/22/2018   Phase II - up with assistance     Phase I - warming device     Phase I - cardiac monitor     Phase I & II - metered glucose                 Patient Active Problem List:     Chronic pain of

## 2018-07-17 NOTE — TELEPHONE ENCOUNTER
I cannot give her Flexeril due to   Skeletal muscle relaxants are not recommended for use in patients age >= 65 years per the [address] [facility] ([place] criteria). Consider using an alternative agent like Tizanidine or Baclofen listed as a choice below    Please let the patient know to  prescription from pharmacy. Requested Prescriptions     Signed Prescriptions Disp Refills    tiZANidine (ZANAFLEX) 2 MG tablet 60 tablet 0     Sig: Take 1 tablet by mouth every 12 hours as needed (back pain) Causes sedation, do not drive while taking this medication. Short term. Authorizing Provider: [facility] [employer], [address] [address]  Phone: [phone] Fax: [phone]    [address] [place] [address] [phone]  [address] [place] [address]  Phone: [phone] Fax: [phone]      Thank you!         FYI    Future Appointments  Date Time Provider Clarence Mas   7/30/2018 10:20 AM VERONIKA De León - CNP [facility] None   8/14/2018 10:45 AM Parrish Ford MD [id number]       Controlled Substances Monitoring:

## 2018-07-27 ENCOUNTER — HOSPITAL ENCOUNTER (OUTPATIENT)
Age: 68
Setting detail: OBSERVATION
Discharge: HOME | End: 2018-07-28
Attending: EMERGENCY MEDICINE | Admitting: EMERGENCY MEDICINE
Payer: MEDICARE

## 2018-07-27 ENCOUNTER — APPOINTMENT (OUTPATIENT)
Dept: GENERAL RADIOLOGY | Age: 68
End: 2018-07-27
Payer: MEDICARE

## 2018-07-27 DIAGNOSIS — R42 DIZZINESS: ICD-10-CM

## 2018-07-27 DIAGNOSIS — R07.9 CHEST PAIN, UNSPECIFIED TYPE: Primary | ICD-10-CM

## 2018-07-27 DIAGNOSIS — Z98.890 S/P ABLATION OF ATRIAL FIBRILLATION: ICD-10-CM

## 2018-07-27 DIAGNOSIS — Z86.79 S/P ABLATION OF ATRIAL FIBRILLATION: ICD-10-CM

## 2018-07-27 LAB
ABSOLUTE EOS #: 0.11 K/UL (ref 0–0.44)
ABSOLUTE IMMATURE GRANULOCYTE: 0.03 K/UL (ref 0–0.3)
ABSOLUTE LYMPH #: 2.06 K/UL (ref 1.1–3.7)
ABSOLUTE MONO #: 0.6 K/UL (ref 0.1–1.2)
ANION GAP SERPL CALCULATED.3IONS-SCNC: 13 MMOL/L (ref 9–17)
BASOPHILS # BLD: 1 % (ref 0–2)
BASOPHILS ABSOLUTE: 0.04 K/UL (ref 0–0.2)
BNP INTERPRETATION: NORMAL
BUN BLDV-MCNC: 14 MG/DL (ref 8–23)
BUN/CREAT BLD: ABNORMAL (ref 9–20)
CALCIUM SERPL-MCNC: 8.4 MG/DL (ref 8.6–10.4)
CHLORIDE BLD-SCNC: 101 MMOL/L (ref 98–107)
CO2: 23 MMOL/L (ref 20–31)
CREAT SERPL-MCNC: 0.84 MG/DL (ref 0.5–0.9)
DIFFERENTIAL TYPE: ABNORMAL
EOSINOPHILS RELATIVE PERCENT: 1 % (ref 1–4)
GFR AFRICAN AMERICAN: >60 ML/MIN
GFR NON-AFRICAN AMERICAN: >60 ML/MIN
GFR SERPL CREATININE-BSD FRML MDRD: ABNORMAL ML/MIN/{1.73_M2}
GFR SERPL CREATININE-BSD FRML MDRD: ABNORMAL ML/MIN/{1.73_M2}
GLUCOSE BLD-MCNC: 115 MG/DL (ref 70–99)
HCT VFR BLD CALC: 37.5 % (ref 36.3–47.1)
HEMOGLOBIN: 11.8 G/DL (ref 11.9–15.1)
IMMATURE GRANULOCYTES: 0 %
LYMPHOCYTES # BLD: 26 % (ref 24–43)
MCH RBC QN AUTO: 29.9 PG (ref 25.2–33.5)
MCHC RBC AUTO-ENTMCNC: 31.5 G/DL (ref 28.4–34.8)
MCV RBC AUTO: 95.2 FL (ref 82.6–102.9)
MONOCYTES # BLD: 8 % (ref 3–12)
NRBC AUTOMATED: 0 PER 100 WBC
PDW BLD-RTO: 13.2 % (ref 11.8–14.4)
PLATELET # BLD: 178 K/UL (ref 138–453)
PLATELET ESTIMATE: ABNORMAL
PMV BLD AUTO: 11.7 FL (ref 8.1–13.5)
POC TROPONIN I: 0 NG/ML (ref 0–0.1)
POC TROPONIN I: 0 NG/ML (ref 0–0.1)
POC TROPONIN INTERP: NORMAL
POC TROPONIN INTERP: NORMAL
POTASSIUM SERPL-SCNC: 4.5 MMOL/L (ref 3.7–5.3)
PRO-BNP: 158 PG/ML
RBC # BLD: 3.94 M/UL (ref 3.95–5.11)
RBC # BLD: ABNORMAL 10*6/UL
SEG NEUTROPHILS: 64 % (ref 36–65)
SEGMENTED NEUTROPHILS ABSOLUTE COUNT: 5.02 K/UL (ref 1.5–8.1)
SODIUM BLD-SCNC: 137 MMOL/L (ref 135–144)
TROPONIN INTERP: NORMAL
TROPONIN T: <0.03 NG/ML
WBC # BLD: 7.9 K/UL (ref 3.5–11.3)
WBC # BLD: ABNORMAL 10*3/UL

## 2018-07-27 PROCEDURE — 96376 TX/PRO/DX INJ SAME DRUG ADON: CPT

## 2018-07-27 PROCEDURE — 93005 ELECTROCARDIOGRAM TRACING: CPT

## 2018-07-27 PROCEDURE — 6370000000 HC RX 637 (ALT 250 FOR IP): Performed by: EMERGENCY MEDICINE

## 2018-07-27 PROCEDURE — 96374 THER/PROPH/DIAG INJ IV PUSH: CPT

## 2018-07-27 PROCEDURE — 36415 COLL VENOUS BLD VENIPUNCTURE: CPT

## 2018-07-27 PROCEDURE — 6360000002 HC RX W HCPCS: Performed by: EMERGENCY MEDICINE

## 2018-07-27 PROCEDURE — 71045 X-RAY EXAM CHEST 1 VIEW: CPT

## 2018-07-27 PROCEDURE — G0378 HOSPITAL OBSERVATION PER HR: HCPCS

## 2018-07-27 PROCEDURE — 84484 ASSAY OF TROPONIN QUANT: CPT

## 2018-07-27 PROCEDURE — 80048 BASIC METABOLIC PNL TOTAL CA: CPT

## 2018-07-27 PROCEDURE — 2580000003 HC RX 258: Performed by: EMERGENCY MEDICINE

## 2018-07-27 PROCEDURE — 94640 AIRWAY INHALATION TREATMENT: CPT

## 2018-07-27 PROCEDURE — 99285 EMERGENCY DEPT VISIT HI MDM: CPT

## 2018-07-27 PROCEDURE — 83880 ASSAY OF NATRIURETIC PEPTIDE: CPT

## 2018-07-27 PROCEDURE — 85025 COMPLETE CBC W/AUTO DIFF WBC: CPT

## 2018-07-27 RX ORDER — SODIUM CHLORIDE 0.9 % (FLUSH) 0.9 %
10 SYRINGE (ML) INJECTION EVERY 12 HOURS SCHEDULED
Status: DISCONTINUED | OUTPATIENT
Start: 2018-07-27 | End: 2018-07-28 | Stop reason: HOSPADM

## 2018-07-27 RX ORDER — FENTANYL CITRATE 50 UG/ML
50 INJECTION, SOLUTION INTRAMUSCULAR; INTRAVENOUS ONCE
Status: COMPLETED | OUTPATIENT
Start: 2018-07-27 | End: 2018-07-27

## 2018-07-27 RX ORDER — NITROFURANTOIN 25; 75 MG/1; MG/1
100 CAPSULE ORAL 2 TIMES DAILY
Status: DISCONTINUED | OUTPATIENT
Start: 2018-07-27 | End: 2018-07-28 | Stop reason: HOSPADM

## 2018-07-27 RX ORDER — FENTANYL CITRATE 50 UG/ML
50 INJECTION, SOLUTION INTRAMUSCULAR; INTRAVENOUS EVERY 4 HOURS PRN
Status: DISCONTINUED | OUTPATIENT
Start: 2018-07-27 | End: 2018-07-28

## 2018-07-27 RX ORDER — ISOSORBIDE MONONITRATE 30 MG/1
30 TABLET, EXTENDED RELEASE ORAL DAILY
Status: DISCONTINUED | OUTPATIENT
Start: 2018-07-27 | End: 2018-07-28 | Stop reason: HOSPADM

## 2018-07-27 RX ORDER — NICOTINE POLACRILEX 4 MG
15 LOZENGE BUCCAL PRN
Status: DISCONTINUED | OUTPATIENT
Start: 2018-07-27 | End: 2018-07-28 | Stop reason: HOSPADM

## 2018-07-27 RX ORDER — CARVEDILOL 3.12 MG/1
3.12 TABLET ORAL 2 TIMES DAILY WITH MEALS
Status: DISCONTINUED | OUTPATIENT
Start: 2018-07-27 | End: 2018-07-28 | Stop reason: HOSPADM

## 2018-07-27 RX ORDER — DEXTROSE MONOHYDRATE 50 MG/ML
100 INJECTION, SOLUTION INTRAVENOUS PRN
Status: DISCONTINUED | OUTPATIENT
Start: 2018-07-27 | End: 2018-07-28 | Stop reason: HOSPADM

## 2018-07-27 RX ORDER — ATORVASTATIN CALCIUM 80 MG/1
80 TABLET, FILM COATED ORAL DAILY
Status: DISCONTINUED | OUTPATIENT
Start: 2018-07-27 | End: 2018-07-28 | Stop reason: HOSPADM

## 2018-07-27 RX ORDER — TIZANIDINE 2 MG/1
2 TABLET ORAL EVERY 12 HOURS PRN
Status: DISCONTINUED | OUTPATIENT
Start: 2018-07-27 | End: 2018-07-28 | Stop reason: HOSPADM

## 2018-07-27 RX ORDER — UREA 10 %
10 LOTION (ML) TOPICAL NIGHTLY
Status: DISCONTINUED | OUTPATIENT
Start: 2018-07-27 | End: 2018-07-28 | Stop reason: HOSPADM

## 2018-07-27 RX ORDER — PANTOPRAZOLE SODIUM 40 MG/1
40 TABLET, DELAYED RELEASE ORAL 2 TIMES DAILY
Status: DISCONTINUED | OUTPATIENT
Start: 2018-07-27 | End: 2018-07-28 | Stop reason: HOSPADM

## 2018-07-27 RX ORDER — FAMOTIDINE 20 MG/1
20 TABLET, FILM COATED ORAL DAILY
Status: DISCONTINUED | OUTPATIENT
Start: 2018-07-27 | End: 2018-07-28 | Stop reason: HOSPADM

## 2018-07-27 RX ORDER — 0.9 % SODIUM CHLORIDE 0.9 %
1000 INTRAVENOUS SOLUTION INTRAVENOUS ONCE
Status: COMPLETED | OUTPATIENT
Start: 2018-07-27 | End: 2018-07-27

## 2018-07-27 RX ORDER — DOCUSATE SODIUM 100 MG/1
100 CAPSULE, LIQUID FILLED ORAL 2 TIMES DAILY
Status: DISCONTINUED | OUTPATIENT
Start: 2018-07-27 | End: 2018-07-28 | Stop reason: HOSPADM

## 2018-07-27 RX ORDER — FENTANYL CITRATE 50 UG/ML
25 INJECTION, SOLUTION INTRAMUSCULAR; INTRAVENOUS EVERY 4 HOURS PRN
Status: DISCONTINUED | OUTPATIENT
Start: 2018-07-27 | End: 2018-07-28

## 2018-07-27 RX ORDER — AMLODIPINE BESYLATE 5 MG/1
5 TABLET ORAL DAILY
Status: DISCONTINUED | OUTPATIENT
Start: 2018-07-27 | End: 2018-07-28 | Stop reason: HOSPADM

## 2018-07-27 RX ORDER — ONDANSETRON 4 MG/1
4 TABLET, ORALLY DISINTEGRATING ORAL ONCE
Status: COMPLETED | OUTPATIENT
Start: 2018-07-27 | End: 2018-07-27

## 2018-07-27 RX ORDER — ONDANSETRON 4 MG/1
4 TABLET, ORALLY DISINTEGRATING ORAL EVERY 8 HOURS PRN
Status: DISCONTINUED | OUTPATIENT
Start: 2018-07-27 | End: 2018-07-28 | Stop reason: HOSPADM

## 2018-07-27 RX ORDER — INSULIN GLARGINE 100 [IU]/ML
45 INJECTION, SOLUTION SUBCUTANEOUS EVERY MORNING
Status: DISCONTINUED | OUTPATIENT
Start: 2018-07-28 | End: 2018-07-28 | Stop reason: HOSPADM

## 2018-07-27 RX ORDER — IPRATROPIUM BROMIDE AND ALBUTEROL SULFATE 2.5; .5 MG/3ML; MG/3ML
3 SOLUTION RESPIRATORY (INHALATION) EVERY 4 HOURS
Status: DISCONTINUED | OUTPATIENT
Start: 2018-07-27 | End: 2018-07-28 | Stop reason: HOSPADM

## 2018-07-27 RX ORDER — CLOPIDOGREL BISULFATE 75 MG/1
75 TABLET ORAL DAILY
Status: DISCONTINUED | OUTPATIENT
Start: 2018-07-27 | End: 2018-07-28 | Stop reason: HOSPADM

## 2018-07-27 RX ORDER — CITALOPRAM 20 MG/1
20 TABLET ORAL 2 TIMES DAILY
Status: DISCONTINUED | OUTPATIENT
Start: 2018-07-27 | End: 2018-07-28 | Stop reason: HOSPADM

## 2018-07-27 RX ORDER — FUROSEMIDE 20 MG/1
20 TABLET ORAL PRN
Status: DISCONTINUED | OUTPATIENT
Start: 2018-07-27 | End: 2018-07-28 | Stop reason: HOSPADM

## 2018-07-27 RX ORDER — LOSARTAN POTASSIUM 25 MG/1
25 TABLET ORAL DAILY
Status: DISCONTINUED | OUTPATIENT
Start: 2018-07-27 | End: 2018-07-28 | Stop reason: HOSPADM

## 2018-07-27 RX ORDER — CALCIUM CARBONATE/VITAMIN D3 250-3.125
1 TABLET ORAL DAILY
Status: DISCONTINUED | OUTPATIENT
Start: 2018-07-27 | End: 2018-07-28 | Stop reason: HOSPADM

## 2018-07-27 RX ORDER — ALBUTEROL SULFATE 90 UG/1
2 AEROSOL, METERED RESPIRATORY (INHALATION) EVERY 6 HOURS PRN
Status: DISCONTINUED | OUTPATIENT
Start: 2018-07-27 | End: 2018-07-28 | Stop reason: HOSPADM

## 2018-07-27 RX ORDER — ACETAMINOPHEN 325 MG/1
650 TABLET ORAL EVERY 4 HOURS PRN
Status: DISCONTINUED | OUTPATIENT
Start: 2018-07-27 | End: 2018-07-28 | Stop reason: HOSPADM

## 2018-07-27 RX ORDER — SODIUM CHLORIDE 0.9 % (FLUSH) 0.9 %
10 SYRINGE (ML) INJECTION PRN
Status: DISCONTINUED | OUTPATIENT
Start: 2018-07-27 | End: 2018-07-28 | Stop reason: HOSPADM

## 2018-07-27 RX ORDER — LANOLIN ALCOHOL/MO/W.PET/CERES
325 CREAM (GRAM) TOPICAL
Status: DISCONTINUED | OUTPATIENT
Start: 2018-07-28 | End: 2018-07-28 | Stop reason: HOSPADM

## 2018-07-27 RX ORDER — TOPIRAMATE 100 MG/1
100 TABLET, FILM COATED ORAL NIGHTLY
Status: DISCONTINUED | OUTPATIENT
Start: 2018-07-27 | End: 2018-07-28 | Stop reason: HOSPADM

## 2018-07-27 RX ORDER — DEXTROSE MONOHYDRATE 25 G/50ML
12.5 INJECTION, SOLUTION INTRAVENOUS PRN
Status: DISCONTINUED | OUTPATIENT
Start: 2018-07-27 | End: 2018-07-28 | Stop reason: HOSPADM

## 2018-07-27 RX ADMIN — DOCUSATE SODIUM 100 MG: 100 CAPSULE ORAL at 21:48

## 2018-07-27 RX ADMIN — CITALOPRAM 20 MG: 20 TABLET, FILM COATED ORAL at 21:48

## 2018-07-27 RX ADMIN — FENTANYL CITRATE 50 MCG: 50 INJECTION, SOLUTION INTRAMUSCULAR; INTRAVENOUS at 21:49

## 2018-07-27 RX ADMIN — FENTANYL CITRATE 50 MCG: 50 INJECTION, SOLUTION INTRAMUSCULAR; INTRAVENOUS at 11:58

## 2018-07-27 RX ADMIN — MAGNESIUM GLUCONATE 500 MG ORAL TABLET 400 MG: 500 TABLET ORAL at 21:48

## 2018-07-27 RX ADMIN — Medication 10 MG: at 21:48

## 2018-07-27 RX ADMIN — IPRATROPIUM BROMIDE AND ALBUTEROL SULFATE 3 ML: .5; 3 SOLUTION RESPIRATORY (INHALATION) at 17:44

## 2018-07-27 RX ADMIN — IPRATROPIUM BROMIDE AND ALBUTEROL SULFATE 3 ML: .5; 3 SOLUTION RESPIRATORY (INHALATION) at 20:04

## 2018-07-27 RX ADMIN — TOPIRAMATE 100 MG: 100 TABLET, FILM COATED ORAL at 21:48

## 2018-07-27 RX ADMIN — ONDANSETRON 4 MG: 4 TABLET, ORALLY DISINTEGRATING ORAL at 11:57

## 2018-07-27 RX ADMIN — SODIUM CHLORIDE 1000 ML: 9 INJECTION, SOLUTION INTRAVENOUS at 11:57

## 2018-07-27 RX ADMIN — FENTANYL CITRATE 50 MCG: 50 INJECTION, SOLUTION INTRAMUSCULAR; INTRAVENOUS at 14:43

## 2018-07-27 ASSESSMENT — PAIN SCALES - GENERAL
PAINLEVEL_OUTOF10: 6
PAINLEVEL_OUTOF10: 7

## 2018-07-27 ASSESSMENT — PAIN DESCRIPTION - PROGRESSION: CLINICAL_PROGRESSION: GRADUALLY WORSENING

## 2018-07-27 ASSESSMENT — PAIN DESCRIPTION - PAIN TYPE: TYPE: ACUTE PAIN

## 2018-07-27 ASSESSMENT — PAIN DESCRIPTION - ORIENTATION
ORIENTATION: LEFT
ORIENTATION: LEFT

## 2018-07-27 ASSESSMENT — PAIN DESCRIPTION - ONSET: ONSET: AWAKENED FROM SLEEP

## 2018-07-27 ASSESSMENT — PAIN DESCRIPTION - LOCATION
LOCATION: CHEST
LOCATION: CHEST

## 2018-07-27 ASSESSMENT — PAIN DESCRIPTION - DESCRIPTORS: DESCRIPTORS: DULL;HEADACHE

## 2018-07-27 NOTE — ED NOTES
Pt up to bedside commode, pt tolerated well, new set of vitals obtained, will continue to monitor pt.       Yong Luevano RN  07/27/18 [id number]

## 2018-07-27 NOTE — ED NOTES
Assumed care of pt. Pt denies chest pain at this time. Call light within reach.      Rosita Joy RN  07/27/18 9470

## 2018-07-27 NOTE — CARE COORDINATION
Case Management Initial Discharge Plan  Kanika Passer,         Readmission Risk              Risk of Unplanned Readmission:        21               Met with:patient to discuss discharge plans. Information verified: address, contacts, phone number, , insurance Yes  PCP: David Pond MD  Date of last visit: Last week    Insurance Provider: Duke Gillis    Discharge Planning    Living Arrangements:  Alone   Support Systems:  Home Care Staff, Family Members    Home has 1 stories  0 stairs to climb to get into front door, na stairs to climb to reach second floor  Location of bedroom/bathroom in home first floor    Patient able to perform ADL's:Assisted    Current Services (outpatient & in home) [facility]  DME equipment: scooter, wheeled walker, Nebulizer, Cpap  DME provider: [facility]    Pharmacy: [facility] [address] Medications:  No  Does patient want to participate in local refill/ meds to beds program?  No    Potential Assistance Needed:  N/A    Patient agreeable to home care: Yes  Freedom of choice provided:  [facility]    Prior SNF/Rehab Placement and Facility: no  Agreeable to SNF/Rehab: No  [place] of choice provided: n/a   Evaluation: no    Expected Discharge date:     Patient expects to be discharged to: Follow Up Appointment: Best Day/ Time:      Transportation provider: medical cab  Transportation arrangements needed for discharge: Yes    Discharge Plan: Home and resume [facility]. [facility] and the patient gets 2 hours of aid service 3 times per week and sees a RN once per month.           Electronically signed by Khalif De Leon RN on 18 at 4:22 PM

## 2018-07-27 NOTE — ED NOTES
Pt to ed via [facility] after waking up from sleep with left sided chest pain and lightheadedness. Pt reports 7/10 pressure like pain in her chest. Pt reports sob intermittently. Pt had 324 ASA and 1 nitro PTA causing a headache.       Regine Wright RN  07/27/18 8301

## 2018-07-28 ENCOUNTER — APPOINTMENT (OUTPATIENT)
Dept: NUCLEAR MEDICINE | Age: 68
End: 2018-07-28
Payer: MEDICARE

## 2018-07-28 VITALS
TEMPERATURE: 97.3 F | HEIGHT: 62 IN | WEIGHT: 231 LBS | HEART RATE: 62 BPM | SYSTOLIC BLOOD PRESSURE: 110 MMHG | RESPIRATION RATE: 16 BRPM | BODY MASS INDEX: 42.51 KG/M2 | OXYGEN SATURATION: 93 % | DIASTOLIC BLOOD PRESSURE: 65 MMHG

## 2018-07-28 LAB
EKG ATRIAL RATE: 57 BPM
EKG ATRIAL RATE: 66 BPM
EKG P AXIS: 64 DEGREES
EKG P AXIS: 78 DEGREES
EKG P-R INTERVAL: 128 MS
EKG P-R INTERVAL: 152 MS
EKG Q-T INTERVAL: 360 MS
EKG Q-T INTERVAL: 428 MS
EKG QRS DURATION: 66 MS
EKG QRS DURATION: 68 MS
EKG QTC CALCULATION (BAZETT): 377 MS
EKG QTC CALCULATION (BAZETT): 416 MS
EKG R AXIS: -19 DEGREES
EKG R AXIS: -24 DEGREES
EKG T AXIS: 67 DEGREES
EKG T AXIS: 84 DEGREES
EKG VENTRICULAR RATE: 57 BPM
EKG VENTRICULAR RATE: 66 BPM
GLUCOSE BLD-MCNC: 124 MG/DL (ref 65–105)
GLUCOSE BLD-MCNC: 97 MG/DL (ref 65–105)
LV EF: 70 %
LVEF MODALITY: NORMAL
TROPONIN INTERP: NORMAL
TROPONIN INTERP: NORMAL
TROPONIN T: <0.03 NG/ML
TROPONIN T: <0.03 NG/ML
TSH SERPL DL<=0.05 MIU/L-ACNC: 2.84 MIU/L (ref 0.3–5)

## 2018-07-28 PROCEDURE — 93017 CV STRESS TEST TRACING ONLY: CPT

## 2018-07-28 PROCEDURE — 2580000003 HC RX 258: Performed by: EMERGENCY MEDICINE

## 2018-07-28 PROCEDURE — 36415 COLL VENOUS BLD VENIPUNCTURE: CPT

## 2018-07-28 PROCEDURE — 96376 TX/PRO/DX INJ SAME DRUG ADON: CPT

## 2018-07-28 PROCEDURE — 6360000002 HC RX W HCPCS: Performed by: EMERGENCY MEDICINE

## 2018-07-28 PROCEDURE — 93005 ELECTROCARDIOGRAM TRACING: CPT

## 2018-07-28 PROCEDURE — G0378 HOSPITAL OBSERVATION PER HR: HCPCS

## 2018-07-28 PROCEDURE — 3430000000 HC RX DIAGNOSTIC RADIOPHARMACEUTICAL: Performed by: EMERGENCY MEDICINE

## 2018-07-28 PROCEDURE — 82947 ASSAY GLUCOSE BLOOD QUANT: CPT

## 2018-07-28 PROCEDURE — 84484 ASSAY OF TROPONIN QUANT: CPT

## 2018-07-28 PROCEDURE — 6370000000 HC RX 637 (ALT 250 FOR IP): Performed by: EMERGENCY MEDICINE

## 2018-07-28 PROCEDURE — A9500 TC99M SESTAMIBI: HCPCS | Performed by: EMERGENCY MEDICINE

## 2018-07-28 PROCEDURE — 84443 ASSAY THYROID STIM HORMONE: CPT

## 2018-07-28 PROCEDURE — 78452 HT MUSCLE IMAGE SPECT MULT: CPT

## 2018-07-28 PROCEDURE — 94640 AIRWAY INHALATION TREATMENT: CPT

## 2018-07-28 RX ORDER — AMINOPHYLLINE DIHYDRATE 25 MG/ML
100 INJECTION, SOLUTION INTRAVENOUS
Status: DISCONTINUED | OUTPATIENT
Start: 2018-07-28 | End: 2018-07-28 | Stop reason: ALTCHOICE

## 2018-07-28 RX ORDER — NITROGLYCERIN 0.4 MG/1
0.4 TABLET SUBLINGUAL EVERY 5 MIN PRN
Status: DISCONTINUED | OUTPATIENT
Start: 2018-07-28 | End: 2018-07-28 | Stop reason: ALTCHOICE

## 2018-07-28 RX ORDER — SODIUM CHLORIDE 0.9 % (FLUSH) 0.9 %
10 SYRINGE (ML) INJECTION PRN
Status: DISCONTINUED | OUTPATIENT
Start: 2018-07-28 | End: 2018-07-28 | Stop reason: ALTCHOICE

## 2018-07-28 RX ORDER — SODIUM CHLORIDE 0.9 % (FLUSH) 0.9 %
10 SYRINGE (ML) INJECTION PRN
Status: DISCONTINUED | OUTPATIENT
Start: 2018-07-28 | End: 2018-07-28 | Stop reason: HOSPADM

## 2018-07-28 RX ORDER — SODIUM CHLORIDE 9 MG/ML
INJECTION, SOLUTION INTRAVENOUS ONCE
Status: COMPLETED | OUTPATIENT
Start: 2018-07-28 | End: 2018-07-28

## 2018-07-28 RX ORDER — METOPROLOL TARTRATE 5 MG/5ML
2.5 INJECTION INTRAVENOUS PRN
Status: DISCONTINUED | OUTPATIENT
Start: 2018-07-28 | End: 2018-07-28 | Stop reason: ALTCHOICE

## 2018-07-28 RX ORDER — HYDROCODONE BITARTRATE AND ACETAMINOPHEN 5; 325 MG/1; MG/1
1 TABLET ORAL EVERY 6 HOURS PRN
Status: DISCONTINUED | OUTPATIENT
Start: 2018-07-28 | End: 2018-07-28 | Stop reason: HOSPADM

## 2018-07-28 RX ADMIN — TETRAKIS(2-METHOXYISOBUTYLISOCYANIDE)COPPER(I) TETRAFLUOROBORATE 40.6 MILLICURIE: 1 INJECTION, POWDER, LYOPHILIZED, FOR SOLUTION INTRAVENOUS at 11:45

## 2018-07-28 RX ADMIN — ISOSORBIDE MONONITRATE 30 MG: 30 TABLET ORAL at 13:41

## 2018-07-28 RX ADMIN — SODIUM CHLORIDE, PRESERVATIVE FREE 10 ML: 5 INJECTION INTRAVENOUS at 11:45

## 2018-07-28 RX ADMIN — Medication 10 ML: at 08:24

## 2018-07-28 RX ADMIN — CARVEDILOL 3.12 MG: 3.12 TABLET, FILM COATED ORAL at 13:37

## 2018-07-28 RX ADMIN — CITALOPRAM 20 MG: 20 TABLET, FILM COATED ORAL at 08:18

## 2018-07-28 RX ADMIN — SODIUM CHLORIDE: 9 INJECTION, SOLUTION INTRAVENOUS at 11:32

## 2018-07-28 RX ADMIN — TETRAKIS(2-METHOXYISOBUTYLISOCYANIDE)COPPER(I) TETRAFLUOROBORATE 15 MILLICURIE: 1 INJECTION, POWDER, LYOPHILIZED, FOR SOLUTION INTRAVENOUS at 09:59

## 2018-07-28 RX ADMIN — LOSARTAN POTASSIUM 25 MG: 25 TABLET, FILM COATED ORAL at 13:37

## 2018-07-28 RX ADMIN — Medication 10 ML: at 00:42

## 2018-07-28 RX ADMIN — NITROFURANTOIN MONOHYDRATE/MACROCRYSTALLINE 100 MG: 25; 75 CAPSULE ORAL at 00:41

## 2018-07-28 RX ADMIN — SODIUM CHLORIDE, PRESERVATIVE FREE 10 ML: 5 INJECTION INTRAVENOUS at 09:59

## 2018-07-28 RX ADMIN — REGADENOSON 0.4 MG: 0.08 INJECTION, SOLUTION INTRAVENOUS at 11:41

## 2018-07-28 RX ADMIN — Medication 10 ML: at 11:31

## 2018-07-28 RX ADMIN — IPRATROPIUM BROMIDE AND ALBUTEROL SULFATE 3 ML: .5; 3 SOLUTION RESPIRATORY (INHALATION) at 15:31

## 2018-07-28 RX ADMIN — HYDROCODONE BITARTRATE AND ACETAMINOPHEN 1 TABLET: 5; 325 TABLET ORAL at 14:45

## 2018-07-28 RX ADMIN — Medication 10 ML: at 11:45

## 2018-07-28 RX ADMIN — CALCIUM CARBONATE-CHOLECALCIFEROL TAB 250 MG-125 UNIT 250 MG: 250-125 TAB at 08:18

## 2018-07-28 RX ADMIN — PANTOPRAZOLE SODIUM 40 MG: 40 TABLET, DELAYED RELEASE ORAL at 00:41

## 2018-07-28 RX ADMIN — CLOPIDOGREL 75 MG: 75 TABLET, FILM COATED ORAL at 08:18

## 2018-07-28 RX ADMIN — MAGNESIUM GLUCONATE 500 MG ORAL TABLET 400 MG: 500 TABLET ORAL at 08:18

## 2018-07-28 RX ADMIN — FAMOTIDINE 20 MG: 20 TABLET, FILM COATED ORAL at 08:18

## 2018-07-28 RX ADMIN — PANTOPRAZOLE SODIUM 40 MG: 40 TABLET, DELAYED RELEASE ORAL at 08:18

## 2018-07-28 RX ADMIN — FENTANYL CITRATE 50 MCG: 50 INJECTION, SOLUTION INTRAMUSCULAR; INTRAVENOUS at 08:24

## 2018-07-28 RX ADMIN — INSULIN GLARGINE 45 UNITS: 100 INJECTION, SOLUTION SUBCUTANEOUS at 13:38

## 2018-07-28 RX ADMIN — AMLODIPINE BESYLATE 5 MG: 5 TABLET ORAL at 13:37

## 2018-07-28 ASSESSMENT — HEART SCORE: ECG: 1

## 2018-07-28 ASSESSMENT — ENCOUNTER SYMPTOMS
RHINORRHEA: 0
CHEST TIGHTNESS: 0
BACK PAIN: 0
BLOOD IN STOOL: 0
CONSTIPATION: 0
ABDOMINAL PAIN: 0
DIARRHEA: 0
SHORTNESS OF BREATH: 1
COUGH: 0
SORE THROAT: 0
NAUSEA: 1
VOMITING: 0

## 2018-07-28 ASSESSMENT — PAIN SCALES - GENERAL
PAINLEVEL_OUTOF10: 7
PAINLEVEL_OUTOF10: 8

## 2018-07-28 NOTE — CARE COORDINATION
Discharge [address] [facility] Case Management Department  Written by: Dahlia Piña RN    Patient Name: Ricky Goldmann  Attending Provider: Aman Agudelo MD  Admit Date: 2018 11:11 AM  MRN: [id number]  Account: [de-identified]                     : 1950  Discharge Date:       Disposition: home with [address]  DC and MAY faxed to John Israel RN

## 2018-07-28 NOTE — PROCEDURES
[address]                   [facility]                                CARDIAC STRESS TEST    PATIENT NAME: Valerie Bui                :        1950  MED REC NO:   [id number]                             ROOM:       0345  ACCOUNT NO:   [de-identified]                           ADMIT DATE: 2018  PROVIDER:     Mychal Brian    DATE OF STUDY:  2018    ORDERING PROVIDER:  Huber Hartman    PRIMARY CARE PROVIDER:  Sandee Landeros    INTERPRETING PHYSICIAN:  Lakisha Ferrera STRESS TESTING    The test was explained and consent signed. Resting heart rate 60 beats per minute. Resting blood pressure 129/51 mmHg. Infusion heart rate 100 beats per minute. Infusion blood pressure 130/50 mmHg. Protocol:  Lexiscan 0.4 mg. Resting EKG:  Normal.  Infusion heart response:  Normal.  Infusion blood pressure response:  Appropriate. Infusion EKG:  Normal.  Chest discomfort:  None. Ischemic EKG changes:  None. IMPRESSION  Negative electrocardiographic portion of the Lexiscan stress test.  Nuclear Medicine report to follow.         Kimberli Joe    D: 2018 13:52:46       T: 2018 13:53:19     [place]/TALA  Job#: [id number]     Doc#: Unknown    CC:    ()

## 2018-07-28 NOTE — ED PROVIDER NOTES
7/17/18   Kaylan Lucas MD   pantoprazole (PROTONIX) 40 MG tablet Take 1 tablet by mouth 2 times daily 7/16/18   Kaylan Lucas MD   oxyCODONE-acetaminophen (PERCOCET) 5-325 MG per tablet Take 1 tablet by mouth See Admin Instructions for 30 days. 1 tab every 6-8 hours prn.  Earliest Fill Date: 7/8/18 7/8/18 8/7/18  Delonte Clement MD   BREO ELLIPTA 119-79 MCG/INH AEPB  5/8/18   Historical Provider, MD   ranitidine (ZANTAC) 150 MG tablet TAKE 1 TAB BY MOUTH TWICE A DAY 6/8/18   Silvia Beltran MD   PROAIR  (55 Base) MCG/ACT inhaler INHALE 2 PUFFS BY INHALATION ROUTE 3 TIMES A DAY FOR 30 DAYS 5/7/18   Silvia Beltran MD   carvedilol (COREG) 3.125 MG tablet TAKE 1 TAB BY MOUTH TWICE A DAY 4/4/18   Silvia Beltran MD   Blood Glucose Monitoring Suppl HARMEET Use daily to check BS 3/27/18   Silvia Beltran MD   metFORMIN (GLUCOPHAGE) 1000 MG tablet TAKE 1 TAB BY MOUTH TWICE DAILY 3/21/18   Silvia Beltran MD   Calcium Carb-Cholecalciferol (OYSCO D) 250-125 MG-UNIT TABS Take 1 tablet by mouth daily 2/14/18   Silvia Beltran MD   sucralfate (CARAFATE) 1 GM tablet Take 1 tablet by mouth 4 times daily 2/14/18   Silvia Beltran MD   ipratropium-albuterol (DUONEB) 0.5-2.5 (3) MG/3ML SOLN nebulizer solution Inhale 3 mLs into the lungs every 4 hours 2/6/18   Belle Kenyon MD   losartan (COZAAR) 25 MG tablet TAKE 1 TAB BY MOUTH ONCE A DAY 12/4/17   Silvia Beltran MD   topiramate (TOPAMAX) 100 MG tablet Take 1 tablet by mouth nightly 11/29/17   Delonte Clement MD   atorvastatin (LIPITOR) 80 MG tablet TAKE 1 TAB BY MOUTH ONCE A DAY 11/17/17   Kaylan Lucas MD   Melatonin 10 MG TABS Take 10 mg by mouth nightly 10/19/17   Silvia Beltran MD   furosemide (LASIX) 20 MG tablet TAKE 1 TABLET BY MOUTH AS NEEDED (LEG SWELLING) 10/18/17   Silvia Beltran MD   citalopram (CELEXA) 40 MG tablet TAKE 1 TAB BY MOUTH ONCE A DAY 10/18/17   Silvia Beltran MD   clopidogrel (PLAVIX) 75 MG tablet TAKE 1 TAB BY MOUTH ONCE A DAY 10/18/17   Jose Armando Joaquin MD   Compression Stockings MISC by Does not apply route Pressure between 20 - 25 . 9/11/17   Jose Armando Joaquin MD   insulin glargine (LANTUS SOLOSTAR) 100 UNIT/ML injection pen Inject 45 Units into the skin every morning  Patient taking differently: Inject 45 Units into the skin every morning 45 U in the morning and 35 U in the evening 7/13/17   Danielle Galvez MD   amLODIPine (NORVASC) 5 MG tablet TAKE 1 TAB BY MOUTH ONCE A DAY 6/29/17   Bigg Tanner MD   Insulin Pen Needle 31G X 6 MM MISC 1 each by Does not apply route daily 3/6/17   Bigg Tanner MD   isosorbide mononitrate (IMDUR) 30 MG extended release tablet TAKE 1 TABLET BY MOUTH DAILY 9/30/16   Bigg Tanner MD   ferrous sulfate 325 (65 FE) MG tablet Take 325 mg by mouth daily (with breakfast)    Historical Provider, MD   ULTICARE SHORT PEN NEEDLES 31G X 8 MM MISC AS DIRECTED 2/5/16   VERONIKA Bhatia - CNP   docusate sodium (COLACE) 100 MG capsule Take 1 capsule by mouth 2 times daily Please use while taking Percocet 9/10/15   Fransisco Higginbotham MD   SYMBICORT 160-4.5 MCG/ACT AERO   2 puffs As needed for sob 5/28/15   Historical Provider, MD   magnesium oxide (MAG-OX) 400 MG tablet Take 400 mg by mouth 2 times daily. Historical Provider, MD   OXYGEN Inhale 3 L into the lungs     Historical Provider, MD   nystatin (MYCOSTATIN) powder Apply 3 times daily. 7/30/13   Margarita Roberson MD       REVIEW OF SYSTEMS    (2-9 systems for level 4, 10 or more for level 5)      Review of Systems   Constitutional: Negative for activity change, appetite change, chills, fatigue and fever. HENT: Negative for congestion, rhinorrhea and sore throat. Eyes: Negative for visual disturbance. Respiratory: Positive for shortness of breath. Negative for cough and chest tightness. Cardiovascular: Positive for chest pain. Negative for palpitations and leg swelling. Gastrointestinal: Positive for nausea.  Negative for abdominal pain, blood  Diet NPO, After Midnight    Vital signs per unit routine    Notify physician    Notify physician    Up as tolerated    Place intermittent pneumatic compression device    HYPOGLYCEMIA TREATMENT: blood glucose less than 50 mg/dL and patient  ALERT and TOLERATING PO    HYPOGLYCEMIA TREATMENT: blood glucose less than 70 mg/dL and patient ALERT and TOLERATING PO    HYPOGLYCEMIA TREATMENT: blood glucose less than 70 mg/dL and patient NOT ALERT or NPO    Telemetry Monitoring    Telemetry monitoring    Full Code    Inpatient consult to Cardiology    Stress Test/Cardiac Monitor - do not uncheck    POCT troponin    POCT troponin    POCT troponin    POCT Glucose    POC Glucose Fingerstick    EKG 12 Lead    EKG 12 Lead    PATIENT STATUS (FROM ED OR OR/PROCEDURAL) Observation       MEDICATIONS ORDERED:  Orders Placed This Encounter   Medications    0.9 % sodium chloride bolus    fentaNYL (SUBLIMAZE) injection 50 mcg    ondansetron (ZOFRAN-ODT) disintegrating tablet 4 mg    fentaNYL (SUBLIMAZE) injection 50 mcg    magnesium oxide (MAG-OX) tablet 400 mg    docusate sodium (COLACE) capsule 100 mg    ferrous sulfate EC tablet 325 mg    isosorbide mononitrate (IMDUR) extended release tablet 30 mg    DISCONTD: Insulin Pen Needle MISC 1 each    amLODIPine (NORVASC) tablet 5 mg    insulin glargine (LANTUS) injection vial 45 Units    furosemide (LASIX) tablet 20 mg    citalopram (CELEXA) tablet 20 mg    clopidogrel (PLAVIX) tablet 75 mg    melatonin tablet 10 mg    atorvastatin (LIPITOR) tablet 80 mg    topiramate (TOPAMAX) tablet 100 mg    losartan (COZAAR) tablet 25 mg    ipratropium-albuterol (DUONEB) nebulizer solution 3 mL    oyster shell calcium/vitamin D 250-125 MG-UNIT per tablet 250 mg    metFORMIN (GLUCOPHAGE) tablet 1,000 mg    carvedilol (COREG) tablet 3.125 mg    albuterol sulfate  (90 Base) MCG/ACT inhaler 2 puff    famotidine (PEPCID) tablet 20 mg    pantoprazole RBC Morphology NOT REPORTED     Platelet Estimate NOT REPORTED    Basic Metabolic Panel   Result Value Ref Range    Glucose 115 (H) 70 - 99 mg/dL    BUN 14 8 - 23 mg/dL    CREATININE 0.84 0.50 - 0.90 mg/dL    Bun/Cre Ratio NOT REPORTED 9 - 20    Calcium 8.4 (L) 8.6 - 10.4 mg/dL    Sodium 137 135 - 144 mmol/L    Potassium 4.5 3.7 - 5.3 mmol/L    Chloride 101 98 - 107 mmol/L    CO2 23 20 - 31 mmol/L    Anion Gap 13 9 - 17 mmol/L    GFR Non-African American >60 >60 mL/min    GFR African American >60 >60 mL/min    GFR Comment          GFR Staging NOT REPORTED    Brain Natriuretic Peptide   Result Value Ref Range    Pro- <300 pg/mL    BNP Interpretation         Troponin   Result Value Ref Range    Troponin T <0.03 <0.03 ng/mL    Troponin Interp         Troponin   Result Value Ref Range    Troponin T <0.03 <0.03 ng/mL    Troponin Interp         Troponin   Result Value Ref Range    Troponin T <0.03 <0.03 ng/mL    Troponin Interp         POCT troponin   Result Value Ref Range    POC Troponin I 0.00 0.00 - 0.10 ng/mL    POC Troponin Interp       The Troponin-I (POC) results cannot be compared to the Troponin-T results. POCT troponin   Result Value Ref Range    POC Troponin I 0.00 0.00 - 0.10 ng/mL    POC Troponin Interp       The Troponin-I (POC) results cannot be compared to the Troponin-T results.    POC Glucose Fingerstick   Result Value Ref Range    POC Glucose 97 65 - 105 mg/dL   EKG 12 Lead   Result Value Ref Range    Ventricular Rate 66 BPM    Atrial Rate 66 BPM    P-R Interval 128 ms    QRS Duration 68 ms    Q-T Interval 360 ms    QTc Calculation (Bazett) 377 ms    P Axis 64 degrees    R Axis -24 degrees    T Axis 84 degrees   EKG 12 Lead   Result Value Ref Range    Ventricular Rate 57 BPM    Atrial Rate 57 BPM    P-R Interval 152 ms    QRS Duration 66 ms    Q-T Interval 428 ms    QTc Calculation (Bazett) 416 ms    P Axis 78 degrees    R Axis -19 degrees    T Axis 67 degrees       IMPRESSION: 75 yr F with

## 2018-07-28 NOTE — H&P
medication charted and reviewed. ALLERGIES     is allergic to robitussin [guaifenesin]; codeine; compazine [prochlorperazine maleate]; iodides; morphine; moxifloxacin; reglan [metoclopramide]; avelox [moxifloxacin hcl in nacl]; cipro xr; and sulfa antibiotics. FAMILY HISTORY     indicated that her mother is . She indicated that her father is . She indicated that her sister is . She indicated that the status of her maternal grandmother is unknown.      family history includes Diabetes in her maternal grandmother and mother; Emphysema in her sister; Heart Disease in her mother; Stomach Cancer in her father. The patient denies any pertinent family history. I have reviewed and agree with the family history entered. I have reviewed the Family History and it is not significant to the case    SOCIAL HISTORY      reports that she quit smoking about 18 years ago. Her smoking use included Cigarettes. She has a 123.00 pack-year smoking history. She has never used smokeless tobacco. She reports that she does not drink alcohol or use drugs. I have reviewed and agree with all Social.  There are no concerns for substance abuse/use. PHYSICAL EXAM     INITIAL VITALS:  height is 5' 2\" (1.575 m) and weight is 231 lb (104.8 kg). Her oral temperature is 97.3 °F (36.3 °C). Her blood pressure is 110/65 and her pulse is 62. Her respiration is 16 and oxygen saturation is 93%.       CONSTITUTIONAL: AOx4, no apparent distress, appears stated age, Obese    HEAD: normocephalic, atraumatic   EYES: PERRLA, EOMI    ENT: moist mucous membranes, uvula midline   NECK: supple, symmetric   BACK: symmetric   LUNGS: clear to auscultation bilaterally   CARDIOVASCULAR: regular rate and rhythm, no murmurs, rubs or gallops, cannot reproduce chest pain with palpation to chest wall   ABDOMEN: soft, non-tender, non-distended with normal active bowel sounds   NEUROLOGIC:  MAEx4, no focal sensory or motor deficits MUSCULOSKELETAL: no clubbing, cyanosis, bilateral nonpitting edema to lower extremities    SKIN: no rash or wounds       DIFFERENTIAL DIAGNOSIS/MDM:   DDX: ACS, atrial fibrillation, COPD exacerbation    DIAGNOSTIC RESULTS     EKG: All EKG's are interpreted by the Observation Physician who either signs or Co-signs this chart in the absence of a cardiologist.    EKG Interpretation    Interpreted by observation physician    Rhythm: sinus bradycardia  Rate: bradycardia  Axis: left  Ectopy: none  Conduction: Low voltage  ST Segments: no acute change and normal  T Waves: T wave inversion in aVL  Q Waves: none    Clinical Impression: non-specific EKG    Radha Aldana DO    RADIOLOGY:   I directly visualized the following  images and reviewed the radiologist interpretations:    Xr Chest Portable    Result Date: 7/27/2018  EXAMINATION: SINGLE XRAY VIEW OF THE CHEST 7/27/2018 12:14 pm COMPARISON: Radiographs from January 31, 2018 HISTORY: ORDERING SYSTEM PROVIDED HISTORY: cp TECHNOLOGIST PROVIDED HISTORY: Reason for exam:->cp Ordering Physician Provided Reason for Exam: Chest Pain . upr Acuity: Unknown Type of Exam: Unknown FINDINGS: One image obtained. Loop recorder device present. Cardiac monitoring leads overlie the chest. Mild cardiomegaly exaggerated by technique  No pleural effusion or pneumothorax. No focal pulmonary consolidation. No free subdiaphragmatic air. 1. Mild cardiomegaly. 2. No acute disease. LABS:  I have reviewed and interpreted all available lab results.   Labs Reviewed   CBC WITH AUTO DIFFERENTIAL - Abnormal; Notable for the following:        Result Value    RBC 3.94 (*)     Hemoglobin 11.8 (*)     All other components within normal limits   BASIC METABOLIC PANEL - Abnormal; Notable for the following:     Glucose 115 (*)     Calcium 8.4 (*)     All other components within normal limits   POC GLUCOSE FINGERSTICK - Abnormal; Notable for the following:     POC Glucose 124 (*)     All other components within normal limits   BRAIN NATRIURETIC PEPTIDE   TROPONIN   TROPONIN   TROPONIN   TSH WITH REFLEX   POCT TROPONIN   POCT TROPONIN   POCT TROPONIN   POC GLUCOSE FINGERSTICK       SCREENING TOOLS:    HEART Risk Score for Chest Pain Patients   History and Physical Exam Suspicion Level  (Nausea, Vomiting, Diaphoresis, Radiation, Exertion)   Slightly Suspicious (0 pts)   Moderately Suspicious (1 pt)   Highly Suspicious (2 pts)   EKG Interpretation   Normal (0 pts)   Non-Specific Repolarization Disturbance (1 pt)   Significant ST-Depression (2 pts)   Age of Patient (in years)   = 39 (0 pts)   46-64 (1 pt)   = 65 (2 pts)   Risk Factors   No Risk Factors (0 pts)   1-2 Risk Factors (1 pt)   = 3 Risk Factors (2 pts)   Risk Factors Include:   Hypercholesterolemia   Hypertension   Diabetes Mellitus   Cigarette smoking   Positive family history   Obesity   CAD   (SLE, CKDz, HIV, Cocaine abuse)   Troponin Levels   = Normal Limit (0 pts)   1-3 Times Normal Limit (1 pt)   > 3 Times Normal Limit (2 pts)  TOTAL:6    Percent Risk for Major Adverse Cardiac Event (MACE)  0-3 pts indicates low risk for MACE   2.5% (DISCHARGE)   4-7 pts indicates moderate risk for MACE  20.3% (OBS)  8-10 pts indicates high risk for MACE  72.7% (EARLY INVASIVE TX)    Wells Criteria PE    1. Symptoms of DVT (3pts): 0  2. No alternative diagnosis better explains illness (3pts):  0  3. Tachycardia >100 (1.5pts): 0  4. Immobilization >= 3 days or surgery in last 4 weeks (1.5pts): 0  5. Hemoptysis (1.5pts): 0  6. Malignancy (1.5pts): 0    >6 pts High Probability  2 to <6 pts Moderate Probability  <2 pts Low Probability      CDU IMPRESSION / Jimena Rojas is a 75 y.o. female who presents with Acute onset of left-sided chest pain about a day and a half ago that's accompanied by dizziness, nauseousness, shortness of breath. Patient has a history of A. fib and has been experiencing palpitations    1.   Acute onset of left-sided

## 2018-07-30 ENCOUNTER — HOSPITAL ENCOUNTER (OUTPATIENT)
Dept: PAIN MANAGEMENT | Age: 68
Discharge: HOME | End: 2018-07-30
Payer: MEDICARE

## 2018-07-30 ENCOUNTER — TELEPHONE (OUTPATIENT)
Dept: FAMILY MEDICINE CLINIC | Age: 68
End: 2018-07-30

## 2018-07-30 DIAGNOSIS — Z51.81 MEDICATION MONITORING ENCOUNTER: Chronic | ICD-10-CM

## 2018-07-30 DIAGNOSIS — M51.36 DDD (DEGENERATIVE DISC DISEASE), LUMBAR: ICD-10-CM

## 2018-07-30 DIAGNOSIS — G89.29 ENCOUNTER FOR CHRONIC PAIN MANAGEMENT: ICD-10-CM

## 2018-07-30 DIAGNOSIS — M54.16 LUMBAR RADICULOPATHY, CHRONIC: ICD-10-CM

## 2018-07-30 DIAGNOSIS — M47.817 LUMBOSACRAL SPONDYLOSIS WITHOUT MYELOPATHY: Primary | Chronic | ICD-10-CM

## 2018-07-30 PROCEDURE — 99213 OFFICE O/P EST LOW 20 MIN: CPT

## 2018-07-30 PROCEDURE — 99213 OFFICE O/P EST LOW 20 MIN: CPT | Performed by: NURSE PRACTITIONER

## 2018-07-30 RX ORDER — OXYCODONE HYDROCHLORIDE AND ACETAMINOPHEN 5; 325 MG/1; MG/1
1 TABLET ORAL SEE ADMIN INSTRUCTIONS
Qty: 100 TABLET | Refills: 0 | Status: SHIPPED | OUTPATIENT
Start: 2018-08-07 | End: 2018-09-13 | Stop reason: SDUPTHER

## 2018-07-30 ASSESSMENT — ENCOUNTER SYMPTOMS
CONSTIPATION: 0
SHORTNESS OF BREATH: 0
BACK PAIN: 1
COUGH: 0

## 2018-07-30 NOTE — PROGRESS NOTES
Patient is here today to review medication contract and for follow up after LESI    Chief Complaint: back pain    PMH: Pt c/o low back pain that radiates down left leg. She has never had a lumbar surgery. She is dependant on her wheelchair to get around - can walk very short distances. She just completed home PT and is now doing the exercises on her own. She had LESI on 7/2/18 and reports moderate relief  - she is able to walk longer distances and complete more tasks at home. Back Pain   This is a chronic problem. The current episode started more than 1 year ago. The problem occurs constantly. The problem has been gradually improving since onset. The pain is present in the lumbar spine. The quality of the pain is described as aching. Radiates to: down both to the feet. The pain is at a severity of 6/10. The pain is mild (better since the LESI). The symptoms are aggravated by standing (walking). Pertinent negatives include no chest pain or fever. She has tried analgesics and bed rest (LESI) for the symptoms. The treatment provided mild relief. Patient denies any new neurological symptoms. No bowel or bladder incontinence, no weakness, and no falling. Pill count: appropriate    Morphine equivalent : 25    Attestation: The Prescription Monitoring Report for this patient was reviewed today. (VERONIKA Narayanan - CNP)  Documentation: Possible medication side effects, risk of tolerance/dependence & alternative treatments discussed., No signs of potential drug abuse or diversion identified. VERONIKA Narayanan - CNP)  Medication Contracts: Existing medication contract. VERONIKA Narayanan CNP)      Past Medical History:   Diagnosis Date    Allergic rhinitis     Anxiety 7/17/2013    Arthritis     Asthma     Atrial fibrillation (HCC)     Back pain     NERVE/DR. GRACIA    CAD (coronary artery disease) 3/21/2013    Caffeine use     2 coffee/day    CHF (congestive heart failure) mouth nightly, Disp: 90 tablet, Rfl: 1    atorvastatin (LIPITOR) 80 MG tablet, TAKE 1 TAB BY MOUTH ONCE A DAY, Disp: 90 tablet, Rfl: 3    Melatonin 10 MG TABS, Take 10 mg by mouth nightly, Disp: 90 tablet, Rfl: 3    furosemide (LASIX) 20 MG tablet, TAKE 1 TABLET BY MOUTH AS NEEDED (LEG SWELLING), Disp: 30 tablet, Rfl: 3    citalopram (CELEXA) 40 MG tablet, TAKE 1 TAB BY MOUTH ONCE A DAY, Disp: 90 tablet, Rfl: 3    clopidogrel (PLAVIX) 75 MG tablet, TAKE 1 TAB BY MOUTH ONCE A DAY, Disp: 90 tablet, Rfl: 3    Compression Stockings MISC, by Does not apply route Pressure between 20 - 25 ., Disp: 1 each, Rfl: 0    insulin glargine (LANTUS SOLOSTAR) 100 UNIT/ML injection pen, Inject 45 Units into the skin every morning (Patient taking differently: Inject 45 Units into the skin every morning 45 U in the morning and 35 U in the evening), Disp: 5 Pen, Rfl: 3    amLODIPine (NORVASC) 5 MG tablet, TAKE 1 TAB BY MOUTH ONCE A DAY, Disp: 90 tablet, Rfl: 0    Insulin Pen Needle 31G X 6 MM MISC, 1 each by Does not apply route daily, Disp: 100 each, Rfl: 3    isosorbide mononitrate (IMDUR) 30 MG extended release tablet, TAKE 1 TABLET BY MOUTH DAILY, Disp: 30 tablet, Rfl: 5    ferrous sulfate 325 (65 FE) MG tablet, Take 325 mg by mouth daily (with breakfast), Disp: , Rfl:     ULTICARE SHORT PEN NEEDLES 31G X 8 MM MISC, AS DIRECTED, Disp: 100 each, Rfl: 5    docusate sodium (COLACE) 100 MG capsule, Take 1 capsule by mouth 2 times daily Please use while taking Percocet, Disp: 30 capsule, Rfl: 0    SYMBICORT 160-4.5 MCG/ACT AERO,  2 puffs As needed for sob, Disp: , Rfl:     OXYGEN, Inhale 3 L into the lungs , Disp: , Rfl:     nystatin (MYCOSTATIN) powder, Apply 3 times daily. , Disp: 3 Bottle, Rfl: 3    magnesium oxide (MAG-OX) 400 MG tablet, Take 400 mg by mouth 2 times daily. , Disp: , Rfl:     Family History   Problem Relation Age of Onset    Diabetes Mother     Heart Disease Mother     Stomach Cancer Father    

## 2018-08-03 ENCOUNTER — TELEPHONE (OUTPATIENT)
Dept: FAMILY MEDICINE CLINIC | Age: 68
End: 2018-08-03

## 2018-08-03 DIAGNOSIS — I50.9 CONGESTIVE HEART FAILURE, UNSPECIFIED CONGESTIVE HEART FAILURE CHRONICITY, UNSPECIFIED CONGESTIVE HEART FAILURE TYPE: ICD-10-CM

## 2018-08-03 DIAGNOSIS — I48.21 PERMANENT ATRIAL FIBRILLATION (HCC): Primary | ICD-10-CM

## 2018-08-07 ENCOUNTER — HOSPITAL ENCOUNTER (OUTPATIENT)
Age: 68
Discharge: HOME | End: 2018-08-07
Payer: MEDICARE

## 2018-08-07 ENCOUNTER — OFFICE VISIT (OUTPATIENT)
Dept: FAMILY MEDICINE CLINIC | Age: 68
End: 2018-08-07
Payer: MEDICARE

## 2018-08-07 VITALS
HEIGHT: 62 IN | HEART RATE: 78 BPM | SYSTOLIC BLOOD PRESSURE: 138 MMHG | TEMPERATURE: 99 F | OXYGEN SATURATION: 92 % | BODY MASS INDEX: 43.02 KG/M2 | WEIGHT: 233.8 LBS | DIASTOLIC BLOOD PRESSURE: 74 MMHG

## 2018-08-07 DIAGNOSIS — E11.42 TYPE 2 DIABETES MELLITUS WITH DIABETIC POLYNEUROPATHY, WITH LONG-TERM CURRENT USE OF INSULIN (HCC): Primary | Chronic | ICD-10-CM

## 2018-08-07 DIAGNOSIS — Z79.4 TYPE 2 DIABETES MELLITUS WITH DIABETIC POLYNEUROPATHY, WITH LONG-TERM CURRENT USE OF INSULIN (HCC): Primary | Chronic | ICD-10-CM

## 2018-08-07 DIAGNOSIS — E11.42 TYPE 2 DIABETES MELLITUS WITH DIABETIC POLYNEUROPATHY, WITH LONG-TERM CURRENT USE OF INSULIN (HCC): Chronic | ICD-10-CM

## 2018-08-07 DIAGNOSIS — Z79.4 TYPE 2 DIABETES MELLITUS WITH DIABETIC POLYNEUROPATHY, WITH LONG-TERM CURRENT USE OF INSULIN (HCC): Chronic | ICD-10-CM

## 2018-08-07 DIAGNOSIS — Z23 NEED FOR PROPHYLACTIC VACCINATION AGAINST DIPHTHERIA-TETANUS-PERTUSSIS (DTP): ICD-10-CM

## 2018-08-07 DIAGNOSIS — Z23 NEED FOR PROPHYLACTIC VACCINATION AND INOCULATION AGAINST VARICELLA: ICD-10-CM

## 2018-08-07 DIAGNOSIS — R07.82 INTERCOSTAL PAIN: ICD-10-CM

## 2018-08-07 DIAGNOSIS — I48.21 PERMANENT ATRIAL FIBRILLATION (HCC): ICD-10-CM

## 2018-08-07 PROBLEM — J96.21 ACUTE ON CHRONIC RESPIRATORY FAILURE WITH HYPOXIA (HCC): Status: RESOLVED | Noted: 2018-02-06 | Resolved: 2018-08-07

## 2018-08-07 PROBLEM — R10.13 DYSPEPSIA: Status: RESOLVED | Noted: 2018-01-21 | Resolved: 2018-08-07

## 2018-08-07 PROBLEM — N89.8 VAGINAL DISCHARGE: Status: RESOLVED | Noted: 2018-07-16 | Resolved: 2018-08-07

## 2018-08-07 PROBLEM — M94.0 COSTOCHONDRITIS: Status: RESOLVED | Noted: 2018-01-21 | Resolved: 2018-08-07

## 2018-08-07 LAB
CREATININE URINE: 209.3 MG/DL (ref 28–217)
HBA1C MFR BLD: 6.7 %
MICROALBUMIN/CREAT 24H UR: 101 MG/L
MICROALBUMIN/CREAT UR-RTO: 48 MCG/MG CREAT

## 2018-08-07 PROCEDURE — 1111F DSCHRG MED/CURRENT MED MERGE: CPT | Performed by: FAMILY MEDICINE

## 2018-08-07 PROCEDURE — 82570 ASSAY OF URINE CREATININE: CPT

## 2018-08-07 PROCEDURE — 99495 TRANSJ CARE MGMT MOD F2F 14D: CPT | Performed by: FAMILY MEDICINE

## 2018-08-07 PROCEDURE — 83036 HEMOGLOBIN GLYCOSYLATED A1C: CPT | Performed by: FAMILY MEDICINE

## 2018-08-07 PROCEDURE — 82043 UR ALBUMIN QUANTITATIVE: CPT

## 2018-08-07 ASSESSMENT — ENCOUNTER SYMPTOMS
RHINORRHEA: 0
BLOOD IN STOOL: 0
NAUSEA: 0
DIARRHEA: 0
ABDOMINAL DISTENTION: 0
COLOR CHANGE: 0
PHOTOPHOBIA: 0
COUGH: 0
SHORTNESS OF BREATH: 0
BACK PAIN: 0
CONSTIPATION: 0
SINUS PAIN: 0
WHEEZING: 0
SINUS PRESSURE: 0

## 2018-08-07 NOTE — PROGRESS NOTES
Visit Information    Have you changed or started any medications since your last visit including any over-the-counter medicines, vitamins, or herbal medicines? no   Have you stopped taking any of your medications? Is so, why? -  no  Are you having any side effects from any of your medications? - no    Have you seen any other physician or provider since your last visit? yes -    Have you had any other diagnostic tests since your last visit? yes -    Have you been seen in the emergency room and/or had an admission in a hospital since we last saw you?  yes -    Have you had your routine dental cleaning in the past 6 months?  no     Do you have an active MyChart account? If no, what is the barrier?   Yes    Patient Care Team:  Jose Armando Joaquin MD as PCP - General (Family Medicine)  Priscilla Torres MD as Consulting Physician (Urology)  Azucena Zaidi MD as Consulting Physician (Pulmonology)  Shimon Stanley MD as Consulting Physician (Internal Medicine)  Chula Graves (Optometry)  VERONIKA Sena - CNP as Nurse Practitioner (Certified Nurse Practitioner)    Medical History Review  Past Medical, Family, and Social History reviewed and does contribute to the patient presenting condition    Health Maintenance   Topic Date Due    DTaP/Tdap/Td vaccine (1 - Tdap) 12/24/1968    Shingles Vaccine (1 of 2 - 2 Dose Series) 12/24/1999    Diabetic retinal exam  07/13/2017    Diabetic foot exam  05/28/2018    Diabetic microalbuminuria test  06/01/2018    Flu vaccine (1) 09/01/2018    Breast cancer screen  11/18/2018    A1C test (Diabetic or Prediabetic)  01/21/2019    Lipid screen  01/21/2019    Potassium monitoring  07/27/2019    Creatinine monitoring  07/27/2019    Colon cancer screen colonoscopy  04/10/2020    DEXA (modify frequency per FRAX score)  Completed    Pneumococcal low/med risk  Completed    Hepatitis C screen  Completed

## 2018-08-07 NOTE — PROGRESS NOTES
(LANTUS SOLOSTAR) 100 UNIT/ML injection pen  Inject 45 Units into the skin every morning             Insulin Pen Needle 31G X 6 MM MISC  1 each by Does not apply route daily             ipratropium-albuterol (DUONEB) 0.5-2.5 (3) MG/3ML SOLN nebulizer solution  Inhale 3 mLs into the lungs every 4 hours             isosorbide mononitrate (IMDUR) 30 MG extended release tablet  TAKE 1 TABLET BY MOUTH DAILY             losartan (COZAAR) 25 MG tablet  TAKE 1 TAB BY MOUTH ONCE A DAY             magnesium oxide (MAG-OX) 400 MG tablet  Take 400 mg by mouth 2 times daily. Melatonin 10 MG TABS  Take 10 mg by mouth nightly             metFORMIN (GLUCOPHAGE) 1000 MG tablet  TAKE 1 TAB BY MOUTH TWICE DAILY             nystatin (MYCOSTATIN) powder  Apply 3 times daily. oxyCODONE-acetaminophen (PERCOCET) 5-325 MG per tablet  Take 1 tablet by mouth See Admin Instructions for 30 days. 1 tab every 6-8 hours prn. OXYGEN  Inhale 3 L into the lungs              pantoprazole (PROTONIX) 40 MG tablet  Take 1 tablet by mouth 2 times daily             PROAIR  (90 Base) MCG/ACT inhaler  INHALE 2 PUFFS BY INHALATION ROUTE 3 TIMES A DAY FOR 30 DAYS             ranitidine (ZANTAC) 150 MG tablet  TAKE 1 TAB BY MOUTH TWICE A DAY             sucralfate (CARAFATE) 1 GM tablet  Take 1 tablet by mouth 4 times daily             SYMBICORT 160-4.5 MCG/ACT AERO    2 puffs As needed for sob             Tdap (ADACEL) 5-2-15.5 LF-MCG/0.5 injection  Inject 0.5 mLs into the muscle once for 1 dose             tiZANidine (ZANAFLEX) 2 MG tablet  Take 1 tablet by mouth every 12 hours as needed (back pain) Causes sedation, do not drive while taking this medication. Short term.              topiramate (TOPAMAX) 100 MG tablet  Take 1 tablet by mouth nightly             ULTICARE SHORT PEN NEEDLES 31G X 8 MM MISC  AS DIRECTED             zoster recombinant adjuvanted vaccine (SHINGRIX) 50 MCG SUSR injection  50 MCG IM then repeat 2-6 months. Medications marked \"taking\" at this time  Outpatient Prescriptions Marked as Taking for the 8/7/18 encounter (Office Visit) with Irish Peralta MD   Medication Sig Dispense Refill    Tdap (ADACEL) 5-2-15.5 LF-MCG/0.5 injection Inject 0.5 mLs into the muscle once for 1 dose 0.5 mL 0    zoster recombinant adjuvanted vaccine (SHINGRIX) 50 MCG SUSR injection 50 MCG IM then repeat 2-6 months. 0.5 mL 1    oxyCODONE-acetaminophen (PERCOCET) 5-325 MG per tablet Take 1 tablet by mouth See Admin Instructions for 30 days. 1 tab every 6-8 hours prn. 100 tablet 0    tiZANidine (ZANAFLEX) 2 MG tablet Take 1 tablet by mouth every 12 hours as needed (back pain) Causes sedation, do not drive while taking this medication. Short term.  60 tablet 0    pantoprazole (PROTONIX) 40 MG tablet Take 1 tablet by mouth 2 times daily 60 tablet 3    BREO ELLIPTA 200-25 MCG/INH AEPB       ranitidine (ZANTAC) 150 MG tablet TAKE 1 TAB BY MOUTH TWICE A  tablet 3    PROAIR  (90 Base) MCG/ACT inhaler INHALE 2 PUFFS BY INHALATION ROUTE 3 TIMES A DAY FOR 30 DAYS 1 Inhaler 3    carvedilol (COREG) 3.125 MG tablet TAKE 1 TAB BY MOUTH TWICE A DAY 60 tablet 3    Blood Glucose Monitoring Suppl HARMEET Use daily to check BS 1 Device 0    metFORMIN (GLUCOPHAGE) 1000 MG tablet TAKE 1 TAB BY MOUTH TWICE DAILY 180 tablet 3    Calcium Carb-Cholecalciferol (OYSCO D) 250-125 MG-UNIT TABS Take 1 tablet by mouth daily 60 tablet 3    sucralfate (CARAFATE) 1 GM tablet Take 1 tablet by mouth 4 times daily 120 tablet 3    ipratropium-albuterol (DUONEB) 0.5-2.5 (3) MG/3ML SOLN nebulizer solution Inhale 3 mLs into the lungs every 4 hours 360 mL 2    losartan (COZAAR) 25 MG tablet TAKE 1 TAB BY MOUTH ONCE A DAY 90 tablet 5    topiramate (TOPAMAX) 100 MG tablet Take 1 tablet by mouth nightly 90 tablet 1    atorvastatin (LIPITOR) 80 MG tablet TAKE 1 TAB BY MOUTH ONCE A DAY 90 tablet 3    Melatonin 10 MG TABS Take 10 mg by mouth nightly 90 tablet 3    furosemide (LASIX) 20 MG tablet TAKE 1 TABLET BY MOUTH AS NEEDED (LEG SWELLING) 30 tablet 3    citalopram (CELEXA) 40 MG tablet TAKE 1 TAB BY MOUTH ONCE A DAY 90 tablet 3    clopidogrel (PLAVIX) 75 MG tablet TAKE 1 TAB BY MOUTH ONCE A DAY 90 tablet 3    Compression Stockings MISC by Does not apply route Pressure between 20 - 25 . 1 each 0    insulin glargine (LANTUS SOLOSTAR) 100 UNIT/ML injection pen Inject 45 Units into the skin every morning (Patient taking differently: Inject 45 Units into the skin every morning 45 U in the morning and 35 U in the evening) 5 Pen 3    amLODIPine (NORVASC) 5 MG tablet TAKE 1 TAB BY MOUTH ONCE A DAY 90 tablet 0    Insulin Pen Needle 31G X 6 MM MISC 1 each by Does not apply route daily 100 each 3    isosorbide mononitrate (IMDUR) 30 MG extended release tablet TAKE 1 TABLET BY MOUTH DAILY 30 tablet 5    ferrous sulfate 325 (65 FE) MG tablet Take 325 mg by mouth daily (with breakfast)      ULTICARE SHORT PEN NEEDLES 31G X 8 MM MISC AS DIRECTED 100 each 5    docusate sodium (COLACE) 100 MG capsule Take 1 capsule by mouth 2 times daily Please use while taking Percocet 30 capsule 0    SYMBICORT 160-4.5 MCG/ACT AERO   2 puffs As needed for sob      magnesium oxide (MAG-OX) 400 MG tablet Take 400 mg by mouth 2 times daily.  OXYGEN Inhale 3 L into the lungs       nystatin (MYCOSTATIN) powder Apply 3 times daily. 3 Bottle 3        Medications patient taking as of now reconciled against medications ordered at time of hospital discharge: Yes    Chief Complaint   Patient presents with   [address] from [facility] chest pain 7/27-7/28       HPI:Patient is here for follow-up for hospital follow-up, was admitted in August unit for chest pain. Patient reports she noticed chest pain and pressure-like symptoms which was relieved by Tylenol. She has visiting nurse, and they called 911 . She had EKG and Which Was Negative.   She had IM then repeat 2-6 months.     Other orders  -     Cancel:  DIABETES FOOT EXAM          Medical Decision Making: moderate complexity

## 2018-08-17 ENCOUNTER — OFFICE VISIT (OUTPATIENT)
Dept: OBGYN CLINIC | Age: 68
End: 2018-08-17
Payer: MEDICARE

## 2018-08-17 VITALS
WEIGHT: 237 LBS | DIASTOLIC BLOOD PRESSURE: 80 MMHG | SYSTOLIC BLOOD PRESSURE: 128 MMHG | BODY MASS INDEX: 43.61 KG/M2 | HEIGHT: 62 IN | HEART RATE: 72 BPM | RESPIRATION RATE: 18 BRPM

## 2018-08-17 DIAGNOSIS — N39.0 URINARY TRACT INFECTION WITH HEMATURIA, SITE UNSPECIFIED: ICD-10-CM

## 2018-08-17 DIAGNOSIS — N95.2 ATROPHIC VAGINITIS: Primary | ICD-10-CM

## 2018-08-17 DIAGNOSIS — R31.9 URINARY TRACT INFECTION WITH HEMATURIA, SITE UNSPECIFIED: ICD-10-CM

## 2018-08-17 DIAGNOSIS — R32 URINARY INCONTINENCE, UNSPECIFIED TYPE: ICD-10-CM

## 2018-08-17 DIAGNOSIS — Z12.39 BREAST CANCER SCREENING: ICD-10-CM

## 2018-08-17 LAB
BILIRUBIN, POC: NEGATIVE
BLOOD URINE, POC: ABNORMAL
CLARITY, POC: CLEAR
COLOR, POC: YELLOW
GLUCOSE URINE, POC: NEGATIVE
KETONES, POC: NEGATIVE
LEUKOCYTE EST, POC: ABNORMAL
NITRITE, POC: NEGATIVE
PH, POC: 5
PROTEIN, POC: ABNORMAL
SPECIFIC GRAVITY, POC: 1.01
UROBILINOGEN, POC: NORMAL

## 2018-08-17 PROCEDURE — 81002 URINALYSIS NONAUTO W/O SCOPE: CPT | Performed by: SPECIALIST

## 2018-08-17 PROCEDURE — 99203 OFFICE O/P NEW LOW 30 MIN: CPT | Performed by: SPECIALIST

## 2018-08-17 RX ORDER — NITROFURANTOIN 25; 75 MG/1; MG/1
100 CAPSULE ORAL 2 TIMES DAILY
Qty: 20 CAPSULE | Refills: 0 | Status: SHIPPED | OUTPATIENT
Start: 2018-08-17 | End: 2018-08-27

## 2018-08-17 ASSESSMENT — ENCOUNTER SYMPTOMS
ABDOMINAL PAIN: 0
EYE PAIN: 0
DIARRHEA: 0
ABDOMINAL DISTENTION: 0
COUGH: 0
VOMITING: 0
CONSTIPATION: 0
APNEA: 0
NAUSEA: 0

## 2018-08-17 NOTE — PROGRESS NOTES
Negative for abdominal distention, abdominal pain, constipation, diarrhea, nausea and vomiting. Endocrine: Negative. Genitourinary: Negative for difficulty urinating, dysuria, menstrual problem and pelvic pain. Leaking urine  Vaginal pressure   Musculoskeletal: Negative for neck pain and neck stiffness. Skin: Negative. Burning on thigh   Neurological: Negative for light-headedness and numbness. Hematological: Negative. Does not bruise/bleed easily. Objective:   Physical Exam   Constitutional: She is oriented to person, place, and time. Vital signs are normal. She appears well-developed and well-nourished. HENT:   Head: Normocephalic and atraumatic. Neck: Normal range of motion. Neck supple. No thyromegaly present. Cardiovascular: Normal rate and regular rhythm. Pulmonary/Chest: Effort normal and breath sounds normal. She has no wheezes. Abdominal: Soft. Bowel sounds are normal. She exhibits no distension and no mass. There is no tenderness. There is no guarding. Genitourinary:   Genitourinary Comments: Thin vaginal mucosa compatible with atrophic vaginitis   Musculoskeletal: Normal range of motion. Neurological: She is alert and oriented to person, place, and time. Skin: Skin is dry. Psychiatric: She has a normal mood and affect. Her behavior is normal. Thought content normal.   Nursing note and vitals reviewed. Assessment:      Patient with atrophic vaginitis, otherwise normal pelvic exam.  As patient is not sexually active at this time, s he does not want treatment. Patient advised that treatment is available if she reconsiders her decision. Patient was advised to have mammogram done. Patient with urinary incontinence. Patient is considering treatment with Botox for this issue and she was advised to return to her previous physician. A urinalysis in the office today is positive for UTI. Will treat with Macrobid.       Plan:      Orders Placed This

## 2018-08-21 ENCOUNTER — TELEPHONE (OUTPATIENT)
Dept: FAMILY MEDICINE CLINIC | Age: 68
End: 2018-08-21

## 2018-08-29 ENCOUNTER — OFFICE VISIT (OUTPATIENT)
Dept: FAMILY MEDICINE CLINIC | Age: 68
End: 2018-08-29
Payer: MEDICARE

## 2018-08-29 VITALS
SYSTOLIC BLOOD PRESSURE: 111 MMHG | OXYGEN SATURATION: 93 % | HEART RATE: 72 BPM | DIASTOLIC BLOOD PRESSURE: 68 MMHG | BODY MASS INDEX: 43.87 KG/M2 | WEIGHT: 238.4 LBS | HEIGHT: 62 IN

## 2018-08-29 DIAGNOSIS — E78.5 HYPERLIPIDEMIA WITH TARGET LDL LESS THAN 100: ICD-10-CM

## 2018-08-29 DIAGNOSIS — G47.33 OBSTRUCTIVE SLEEP APNEA SYNDROME: ICD-10-CM

## 2018-08-29 DIAGNOSIS — I25.83 CORONARY ARTERY DISEASE DUE TO LIPID RICH PLAQUE: ICD-10-CM

## 2018-08-29 DIAGNOSIS — I25.10 CORONARY ARTERY DISEASE DUE TO LIPID RICH PLAQUE: ICD-10-CM

## 2018-08-29 DIAGNOSIS — E11.42 TYPE 2 DIABETES MELLITUS WITH DIABETIC POLYNEUROPATHY, WITH LONG-TERM CURRENT USE OF INSULIN (HCC): Chronic | ICD-10-CM

## 2018-08-29 DIAGNOSIS — J44.9 ASTHMA WITH COPD (HCC): ICD-10-CM

## 2018-08-29 DIAGNOSIS — E66.01 MORBID OBESITY WITH BMI OF 40.0-44.9, ADULT (HCC): ICD-10-CM

## 2018-08-29 DIAGNOSIS — Z79.4 TYPE 2 DIABETES MELLITUS WITH DIABETIC POLYNEUROPATHY, WITH LONG-TERM CURRENT USE OF INSULIN (HCC): Chronic | ICD-10-CM

## 2018-08-29 DIAGNOSIS — G62.9 NEUROPATHY: ICD-10-CM

## 2018-08-29 PROCEDURE — 99214 OFFICE O/P EST MOD 30 MIN: CPT | Performed by: FAMILY MEDICINE

## 2018-08-29 RX ORDER — NITROGLYCERIN 0.4 MG/1
TABLET SUBLINGUAL
COMMUNITY
Start: 2018-08-28 | End: 2022-07-25

## 2018-08-29 RX ORDER — GABAPENTIN 300 MG/1
300 CAPSULE ORAL 2 TIMES DAILY
Qty: 60 CAPSULE | Refills: 0 | Status: SHIPPED | OUTPATIENT
Start: 2018-08-29 | End: 2018-11-08 | Stop reason: ALTCHOICE

## 2018-08-29 ASSESSMENT — ENCOUNTER SYMPTOMS
COUGH: 0
SINUS PRESSURE: 0
NAUSEA: 0
CONSTIPATION: 0
SHORTNESS OF BREATH: 0
DIARRHEA: 0
BACK PAIN: 1
RHINORRHEA: 0
PHOTOPHOBIA: 0
WHEEZING: 0
ABDOMINAL PAIN: 0

## 2018-08-29 NOTE — PROGRESS NOTES
each 0    insulin glargine (LANTUS SOLOSTAR) 100 UNIT/ML injection pen Inject 45 Units into the skin every morning (Patient taking differently: Inject 45 Units into the skin every morning 45 U in the morning and 35 U in the evening) 5 Pen 3    amLODIPine (NORVASC) 5 MG tablet TAKE 1 TAB BY MOUTH ONCE A DAY 90 tablet 0    Insulin Pen Needle 31G X 6 MM MISC 1 each by Does not apply route daily 100 each 3    isosorbide mononitrate (IMDUR) 30 MG extended release tablet TAKE 1 TABLET BY MOUTH DAILY 30 tablet 5    ferrous sulfate 325 (65 FE) MG tablet Take 325 mg by mouth daily (with breakfast)      ULTICARE SHORT PEN NEEDLES 31G X 8 MM MISC AS DIRECTED 100 each 5    docusate sodium (COLACE) 100 MG capsule Take 1 capsule by mouth 2 times daily Please use while taking Percocet 30 capsule 0    magnesium oxide (MAG-OX) 400 MG tablet Take 400 mg by mouth 2 times daily.  OXYGEN Inhale 3 L into the lungs       nystatin (MYCOSTATIN) powder Apply 3 times daily. 3 Bottle 3    tiZANidine (ZANAFLEX) 2 MG tablet Take 1 tablet by mouth every 12 hours as needed (back pain) Causes sedation, do not drive while taking this medication. Short term. 60 tablet 0    BREO ELLIPTA 200-25 MCG/INH AEPB       Calcium Carb-Cholecalciferol (OYSCO D) 250-125 MG-UNIT TABS Take 1 tablet by mouth daily 60 tablet 3    sucralfate (CARAFATE) 1 GM tablet Take 1 tablet by mouth 4 times daily 120 tablet 3    SYMBICORT 160-4.5 MCG/ACT AERO   2 puffs As needed for sob       No current facility-administered medications for this visit.               Social History     Social History    Marital status: Single     Spouse name: N/A    Number of children: N/A    Years of education: N/A     Occupational History    disabled N/A     Social History Main Topics    Smoking status: Former Smoker     Packs/day: 3.00     Years: 41.00     Types: Cigarettes     Quit date: 1/1/2000    Smokeless tobacco: Never Used      Comment: quit in 2000    Alcohol use reactive to light and accommodation, extraocular   movements intact. ENT: Moist mucous membranes. No erythema is noted. NECK: Supple. No masses. No lymphadenopathy. CARDIOVASCULAR: Regular rate and rhythm. PULMONARY: Lungs are clear to auscultation bilaterally. ABDOMEN: Soft, nontender, nondistended. Positive bowel sounds. MUSCULOSKELETAL: Patient uses walker for walking, strength is decreased in bilateral lower extremities. Sensations normal.  NEUROLOGIC: Cranial nerves II through XII grossly intact. No focal deficits are noted. Visual inspection: thick and long nails  Deformity/amputation: absent  Skin lesions/pre-ulcerative calluses: absent  Edema: right- trace, left- trace    Sensory exam:  Monofilament sensation: normal  (minimum of 5 random plantar locations tested, avoiding callused areas - > 1 area with absence of sensation is + for neuropathy)    Plus at least one of the following:  Pulses: normal,       ASSESSMENT AND PLAN      1. Coronary artery disease due to lipid rich plaque  -Stable on current treatment as per cardiology, continue same treatment    2. Asthma with COPD ([facility] [address]  -Stable on current treatment    3. Type 2 diabetes mellitus with diabetic polyneuropathy, with long-term current use of insulin (HCC)  -Controlled, started on Neurontin discussed with patient about the side effects. Patient is already on narcotics. -  DIABETES FOOT EXAM  - gabapentin (NEURONTIN) 300 MG capsule; Take 1 capsule by mouth 2 times daily for 30 days. .  Dispense: 60 capsule; Refill: 0    4. Obstructive sleep apnea syndrome  -Stable on BiPAP    5. Neuropathy  -Started on Neurontin and follow her in 3 months.     6. Hyperlipidemia with target LDL less than 100  -Discontinued Lipitor due to side effects, continued to monitor lipid panel          Orders Placed This Encounter   Procedures     DIABETES FOOT EXAM         Medications Discontinued During This Encounter   Medication Reason    atorvastatin medications and allergies were reviewed as documented in today's encounter. Medications, labs, diagnostic studies, consultations and follow-up as documented in this encounter. Return in about 3 months (around 11/29/2018) for copd, dm, repeat a1c , . Patient was seen with total face to face time of 25 minutes. More than 50% of this visit was counseling and education. Future Appointments  Date Time Provider Clarence Mas   9/4/2018 10:40 AM VERONIKA Rodriguez - CNP [facility] None   9/6/2018 2:45 PM Ophelia Jaime MD T [facility]   11/29/2018 9:30 AM Jose Armando Joaquin MD [facility] Maria Eugenia Romero     This note was completed by using the assistance of a speech-recognition program. However, inadvertent computerized transcription errors may be present. Although every effort was made to ensure accuracy, no guarantees can be provided that every mistake has been identified and corrected by editing.   Electronically signed by Jose Armando Joaquin MD on 8/29/2018  11:50 AM

## 2018-08-29 NOTE — PROGRESS NOTES
Visit Information    Have you changed or started any medications since your last visit including any over-the-counter medicines, vitamins, or herbal medicines? no   Are you having any side effects from any of your medications? -  no  Have you stopped taking any of your medications? Is so, why? -  no    Have you seen any other physician or provider since your last visit? Yes - Records Requested  Have you had any other diagnostic tests since your last visit? No  Have you been seen in the emergency room and/or had an admission to a hospital since we last saw you? No  Have you had your routine dental cleaning in the past 6 months? no    Have you activated your [email or web address] account? If not, what are your barriers?  Yes     Patient Care Team:  Marilee Lai MD as PCP - General (Family Medicine)  Santy Felder MD as Consulting Physician (Urology)  Maximilian Molina MD as Consulting Physician (Pulmonology)  Pastor Radhames MD as Consulting Physician (Internal Medicine)  Inocencio Leavitt (Optometry)  VERONIKA Quezada CNP as Nurse Practitioner (Certified Nurse Practitioner)    Medical History Review  Past Medical, Family, and Social History reviewed and does contribute to the patient presenting condition    Health Maintenance   Topic Date Due    DTaP/Tdap/Td vaccine (1 - Tdap) 12/24/1968    Shingles Vaccine (1 of 2 - 2 Dose Series) 12/24/1999    Diabetic retinal exam  07/13/2017    Diabetic foot exam  05/28/2018    Flu vaccine (1) 09/01/2018    Breast cancer screen  11/18/2018    Lipid screen  01/21/2019    Potassium monitoring  07/27/2019    Creatinine monitoring  07/27/2019    A1C test (Diabetic or Prediabetic)  08/07/2019    Diabetic microalbuminuria test  08/07/2019    Colon cancer screen colonoscopy  04/10/2020    DEXA (modify frequency per FRAX score)  Completed    Pneumococcal low/med risk  Completed    Hepatitis C screen  Completed

## 2018-08-29 NOTE — LETTER
MEDICATION AGREEMENT     [facility]  [facility]      For certain conditions, multiple classes of medications may be used to help better manage your symptoms, and to improve your ability to function at home, work and in social settings. However, these medications do have risks, which will be discussed with you, including addiction and dependency. The following prescribed medications need frequent monitoring and will require you to partner and assist in your healthcare. Medication  Dose, instructions and quantity as indicated on current prescription bottle Diagnosis/Reason(s) for Taking Category     neurontin 300mg 60 /month                              Benefits and goals of Controlled Substance Medications: There are two potential goals for your treatment: (1) decreased pain and suffering (2) improved daily life functions. There are many possible treatments for your chronic condition(s), and, in addition to controlled substance medications, we will try alternatives such as physical therapy, yoga, massage, home daily exercise, meditation, relaxation techniques, injections, chiropractic manipulations, surgery, cognitive therapy, hypnosis and many medications that are not habit-forming. Use of controlled substance medications may be helpful, but they are unlikely to resolve all of your symptoms or restore all function. Risks of Controlled Substance Medications:    Opioid pain medications: These medications can lead to problems such as addiction/dependence, sedation, lightheadedness/dizziness, memory issues, falls, constipation, nausea, or vomiting. They may also impair the ability to drive or operate machinery. Additionally, these medications may lower testosterone levels, leading to loss of bone strength, stamina and sex drive.   They may cause problems with breathing, sleep apnea and reduced coughing, which are especially dangerous for patients with lung disease. Overdose or dangerous interactions with alcohol and other medications may occur, leading to death. Hyperalgesia may develop, in which patients receiving opioids for the treatment of pain may actually become more sensitive to certain painful stimuli, and in some cases, experience pain from ordinarily non-painful stimuli. Women between the ages of 14-53 who could become pregnant should carefully weigh the risks and benefits of opioids with their physicians, as these medications increase the risk of pregnancy complications, including miscarriage,  delivery and stillbirth. It is also possible for babies to be born addicted to opioids. Opioid dependence withdrawal symptoms may include; feelings of uneasiness, increased pain, irritability, belly pain, diarrhea, sweats and goose-flesh. Benzodiazepines and non-benzodiazepine sleep medications: These medications can lead to problems such as addiction/dependence, sedation, fatigue, lightheadedness, dizziness, incoordination, falls, depression, hallucinations, and impaired judgment, memory and concentration. The ability to drive and operate machinery may also be affected. Abnormal sleep-related behaviors have been reported, including sleep walking, driving, making telephone calls, eating, or having sex while not fully awake. These medications can suppress breathing and worsen sleep apnea, particularly when combined with alcohol or other sedating medications, potentially leading to death. Dependence withdrawal symptoms may include tremors, anxiety, hallucinations and seizures. Stimulants:  Common adverse effects include addiction/dependence, increased blood pressure and heart rate, decreased appetite, nausea, involuntary weight loss, insomnia, irritability, and headaches.   These risks may increase when these medications are combined with other stimulants, such as caffeine pills or energy drinks, certain weight loss supplements and oral decongestants. Dependence withdrawal symptoms may include depressed mood, loss of interest, suicidal thoughts, anxiety, fatigue, appetite changes and agitation. Testosterone replacement therapy:  Potential side effects include increased risk of stroke and heart attack, blood clots, increased blood pressure, increased cholesterol, enlarged prostate, sleep apnea, irritability/aggression and other mood disorders, and decreased fertility. Other:     1. I understand that I have the following responsibilities:  · I will take medications at the dose and frequency prescribed. · I will not increase or change how I take my medications without the approval of the health care provider who signs this Medication Agreement. · I will arrange for refills at the prescribed interval ONLY during regular office hours. I will not ask for refills earlier than agreed, after-hours, on holidays or on weekends. · I will obtain all refills for these medications at  ·  ____________________________________  pharmacy (phone number  ·  ________________________), with full consent for my provider and pharmacist to exchange information in writing or verbally. · I will not request any pain medications or controlled substances from other providers and will inform this provider of all other medications I am taking. · I will inform my other health care providers that I am taking these medications and of the existence of this [id number]. In the event of an emergency, I will provide the same information to the emergency department providers. · I will protect my prescriptions and medications. I understand that lost or misplaced prescriptions will not be replaced. · I will keep medications only for my own use and will not share them with others. I will keep all medications away from children. · I agree to participate in any medical, psychological or psychiatric assessments recommended by my provider. · I will actively participate in any program designed to improve function, including social, physical, psychological and daily or work activities. 2. I will not use illegal or street drugs or another person's prescription. If I have an addiction problem with drugs or alcohol and my provider asks me to enter a program to address this issue, I agree to follow through. Such programs may include:  · 12-Step program and securing a sponsor  · Individual counseling   · Inpatient or outpatient treatment  · Other:_____________________________________________________________________________________________________________________________________________    If in treatment, I will request that a copy of the programs initial evaluation and treatment recommendations be sent to this provider and will not expect refills until that is received. I will also request written monthly updates be sent to this provider to verify my continuing treatment. 3. I will consent to drug screening upon my providers request to assure I am only taking the prescribed drugs, described in this MEDICATION AGREEMENT. I understand that a drug screen is a laboratory test in which a sample of my urine, blood or saliva is checked to see what drugs I have been taking. 4. I agree that I will treat the providers and staff at this office with respect at all times. I will keep all of my scheduled appointments, but if I need to cancel my appointment, I will do so a minimum of 24 hours before it is scheduled. 5. I understand that this provider may stop prescribing the medications listed if:  · I do not show any improvement in pain, or my activity has not improved. · I develop rapid tolerance or loss of improvement, as described in my treatment plan. · I develop significant side effects from the medication.   · My behavior is inconsistent with the responsibilities outlined above,

## 2018-08-30 ENCOUNTER — TELEPHONE (OUTPATIENT)
Dept: PAIN MANAGEMENT | Age: 68
End: 2018-08-30

## 2018-09-13 ENCOUNTER — HOSPITAL ENCOUNTER (OUTPATIENT)
Dept: PAIN MANAGEMENT | Age: 68
Discharge: HOME | End: 2018-09-13
Payer: MEDICARE

## 2018-09-13 VITALS
OXYGEN SATURATION: 92 % | RESPIRATION RATE: 16 BRPM | HEIGHT: 62 IN | WEIGHT: 238 LBS | SYSTOLIC BLOOD PRESSURE: 125 MMHG | DIASTOLIC BLOOD PRESSURE: 56 MMHG | HEART RATE: 76 BPM | TEMPERATURE: 97.8 F | BODY MASS INDEX: 43.79 KG/M2

## 2018-09-13 DIAGNOSIS — M50.30 DDD (DEGENERATIVE DISC DISEASE), CERVICAL: ICD-10-CM

## 2018-09-13 DIAGNOSIS — M51.36 DDD (DEGENERATIVE DISC DISEASE), LUMBAR: ICD-10-CM

## 2018-09-13 DIAGNOSIS — M47.892 OTHER OSTEOARTHRITIS OF SPINE, CERVICAL REGION: ICD-10-CM

## 2018-09-13 DIAGNOSIS — M54.16 LUMBAR RADICULOPATHY, CHRONIC: ICD-10-CM

## 2018-09-13 DIAGNOSIS — M54.81 BILATERAL OCCIPITAL NEURALGIA: ICD-10-CM

## 2018-09-13 DIAGNOSIS — M47.812 OSTEOARTHRITIS OF CERVICAL SPINE, UNSPECIFIED SPINAL OSTEOARTHRITIS COMPLICATION STATUS: ICD-10-CM

## 2018-09-13 DIAGNOSIS — M46.1 SACROILIITIS, NOT ELSEWHERE CLASSIFIED (HCC): ICD-10-CM

## 2018-09-13 DIAGNOSIS — G43.709 CHRONIC MIGRAINE WITHOUT AURA WITHOUT STATUS MIGRAINOSUS, NOT INTRACTABLE: ICD-10-CM

## 2018-09-13 DIAGNOSIS — Z51.81 ENCOUNTER FOR MEDICATION MONITORING: ICD-10-CM

## 2018-09-13 DIAGNOSIS — M47.817 LUMBOSACRAL SPONDYLOSIS WITHOUT MYELOPATHY: Primary | ICD-10-CM

## 2018-09-13 DIAGNOSIS — G89.29 ENCOUNTER FOR CHRONIC PAIN MANAGEMENT: ICD-10-CM

## 2018-09-13 DIAGNOSIS — F11.90 CHRONIC, CONTINUOUS USE OF OPIOIDS: ICD-10-CM

## 2018-09-13 DIAGNOSIS — G89.29 CHRONIC PAIN OF LEFT KNEE: ICD-10-CM

## 2018-09-13 DIAGNOSIS — Z51.81 MEDICATION MONITORING ENCOUNTER: ICD-10-CM

## 2018-09-13 DIAGNOSIS — M51.37 DEGENERATION OF LUMBAR OR LUMBOSACRAL INTERVERTEBRAL DISC: ICD-10-CM

## 2018-09-13 DIAGNOSIS — M50.30 DEGENERATIVE CERVICAL DISC: ICD-10-CM

## 2018-09-13 DIAGNOSIS — M25.562 CHRONIC PAIN OF LEFT KNEE: ICD-10-CM

## 2018-09-13 PROCEDURE — 99213 OFFICE O/P EST LOW 20 MIN: CPT | Performed by: NURSE PRACTITIONER

## 2018-09-13 PROCEDURE — 99213 OFFICE O/P EST LOW 20 MIN: CPT

## 2018-09-13 RX ORDER — OXYCODONE HYDROCHLORIDE AND ACETAMINOPHEN 5; 325 MG/1; MG/1
1 TABLET ORAL SEE ADMIN INSTRUCTIONS
Qty: 100 TABLET | Refills: 0 | Status: SHIPPED | OUTPATIENT
Start: 2018-09-13 | End: 2018-10-09 | Stop reason: SDUPTHER

## 2018-09-13 ASSESSMENT — ENCOUNTER SYMPTOMS
BACK PAIN: 1
ROS SKIN COMMENTS: FACE
NAUSEA: 1
RESPIRATORY NEGATIVE: 1

## 2018-09-13 NOTE — PROGRESS NOTES
Review:      As above, I did review the imaging    6/13/2018  3:38 PM - Carmelo, Kellypn Incoming Lab Results From [facility]     Component Results     Component Value Ref Range & Units Status Collected Lab   Pain Management Drug Panel Interp, Urine Consistent   Final 06/08/2018  9:45 AM [facility]   (NOTE)   ________________________________________________________________   DRUGS EXPECTED:   OXYCODONE [6/3/18]   ________________________________________________________________   CONSISTENT with medications provided:   OXYCODONE : based on the absence of oxycodone and metabolites   ________________________________________________________________   INTERPRETIVE INFORMATION: Pain Mgt [place], [place] Spec/EMIT, Ur,                            Interp   Interpretation depends on accuracy and completeness of patient   medication information submitted by client. 6-Acetylmorphine, Ur Not Detected   Final 06/08/2018  9:45 AM [facility]   7-Aminoclonazepam, Urine Not Detected   Final 06/08/2018  9:45 AM [facility]   Alpha-OH-Alpraz, Urine Not Detected   Final 06/08/2018  9:45 AM [facility]   Alprazolam, Urine Not Detected   Final 06/08/2018  9:45 AM [facility]   Amphetamines, urine Not Detected   Final 06/08/2018  9:45 AM [facility]   Barbiturates, Ur Not Detected   Final 06/08/2018  9:45 AM [facility]   Benzoylecgonine, Ur Not Detected   Final 06/08/2018  9:45 AM [facility]   Buprenorphine Urine Not Detected   Final 06/08/2018  9:45 AM [facility]   Carisoprodol, Ur Not Detected   Final 06/08/2018  9:45 AM [facility]   (NOTE)   The carisoprodol immunoassay has cross-reactivity to carisoprodol   and meprobamate.     Clonazepam, Urine Not Detected   Final 06/08/2018  9:45 AM [facility]   Codeine, Urine Not Detected   Final 06/08/2018  9:45 AM [facility]   MDA, Ur Not Detected   Final 06/08/2018  9:45 AM [facility]   Diazepam, Urine Not Detected   Final 06/08/2018  9:45 AM [facility]   Ethyl Glucuronide Ur Not Detected   Final 06/08/2018  9:45 AM [facility]   Fentanyl, Ur Not Detected   Final 06/08/2018  9:45 AM [facility]   Hydrocodone, Urine Not Detected   Final 06/08/2018  9:45 AM [facility]   Hydromorphone, Urine Not Detected   Final 06/08/2018  9:45 AM [facility]   Lorazepam, Urine Not Detected   Final 06/08/2018  9:45 AM [facility]   Marijuana Metab, Ur Not Detected   Final 06/08/2018  9:45 AM [facility]   MDEA, ALMA, Ur Not Detected   Final 06/08/2018  9:45 AM [facility]   MDMA, Urine Not Detected   Final 06/08/2018  9:45 AM [facility]   Meperidine Metab, Ur Not Detected   Final 06/08/2018  9:45 AM [facility]   Methadone, Urine Not Detected   Final 06/08/2018  9:45 AM [facility]   Methamphetamine, Urine Not Detected   Final 06/08/2018  9:45 AM [facility]   Methylphenidate Not Detected   Final 06/08/2018  9:45 AM [facility]   Midazolam, Urine Not Detected   Final 06/08/2018  9:45 AM [facility]   Morphine Urine Not Detected   Final 06/08/2018  9:45 AM [facility]   Norbuprenorphine, Urine Not Detected   Final 06/08/2018  9:45 AM [facility]   Nordiazepam, Urine Not Detected   Final 06/08/2018  9:45 AM [facility]   Norfentanyl, Urine Not Detected   Final 06/08/2018  9:45 AM [facility]   NORHYDROCODONE, URINE Not Detected   Final 06/08/2018  9:45 AM [facility]   Noroxycodone, Urine Not Detected   Final 06/08/2018  9:45 AM [facility]   NOROXYMORPHONE, URINE Not Detected   Final 06/08/2018  9:45 AM [facility]   Oxazepam, Urine Not Detected   Final 06/08/2018  9:45 AM [facility]   Oxycodone Urine Not Detected   Final 06/08/2018  9:45 AM [facility]   Oxymorphone, Urine Not Detected   Final 06/08/2018  9:45 AM [facility]   PCP, Urine Not Detected   Final 06/08/2018  9:45 AM [facility]   Phentermine, Ur Not Detected   Final 06/08/2018  9:45 AM [facility]   Propoxyphene, Urine Not Detected   Final 06/08/2018  9:45 AM [facility]   Tapentadol-O-Sulfate, Urine Not Detected   Final 06/08/2018  9:45 AM [facility]   Tapentadol, Urine Not Detected   Final 06/08/2018  9:45 AM [facility]   Temazepam, Urine Not Detected   Final 06/08/2018  9:45 AM [facility]   Tramadol, Urine Not Detected   Final 06/08/2018  9:45 AM [facility]   Zolpidem, Urine Not Detected   Final 06/08/2018  9:45 AM [facility]   Drugs Expected, Ur OXYCODONE ON 6/3/18 Final 06/08/2018  9:45 AM - [address]  Lab   Creatinine, Ur 197.8  20.0 - 400.0 mg/dL Final 06/08/2018  9:45 AM [facility]   Pain Mgt Drug Panel, Hi Res, Ur See Below   Final 06/08/2018  9:45 AM [facility]   (NOTE)   Methodology: Qualitative Enzyme Immunoassay and Qualitative Liquid   Chromatography-Time of Flight-Mass Spectrometry or Tandem Mass   Spectrometry, Quantitative Spectrophotometry   The absence of expected drug(s) and/or drug metabolite(s) may   indicate non-compliance, inappropriate timing of specimen   collection relative to drug administration, poor drug absorption,   diluted/adulterated urine, or limitations of testing. The   concentration must be greater than or equal to the cutoff to be   reported as present.  If specific drug concentrations are   required, contact the laboratory within two weeks of specimen   collection to request quantification by a second analytical   technique. Interpretive questions should be directed to the   laboratory. Results based on immunoassay detection that do not match clinical   expectations should be   interpreted with caution. Confirmatory testing by mass   spectrometry for immunoassay-based results is available, if   ordered within two weeks of specimen collection. Additional   charges apply. For medical purposes only; not valid for forensic use. This test was developed and its performance characteristics   determined by [facility]. The U.S. Food and Drug   Administration has not approved or cleared this test; however, FDA   clearance or approval is not currently required for clinical use. The results are not intended to be used as the sole means for   clinical diagnosis or patient management decisions. EER Hi Res Interp Ur See Note   Final 06/08/2018  9:45 AM [facility]   (NOTE)   Access [facility] Enhanced Report using either link below:   -Direct access: [email or web address]   -Enter Username, Password: [email or web address] Username: [email or web address]   Password: [email or web address]   Performed by Justin Bruno   [place] [address], [address] [facility] [phone]   www. Brittanie Arias MD, Lab. Director   Performed at [facility]: ANA DUTTA [address] [phone]            Past Medical History:   Diagnosis Date    Allergic rhinitis     Anxiety 7/17/2013    Arthritis     Asthma     Atrial fibrillation (HCC)     Back pain     NERVE/DR. GRACIA    CAD (coronary artery disease) 3/21/2013    Caffeine use     2 coffee/day    CHF (congestive heart failure) (HCC)     Chronic kidney disease     COPD (chronic obstructive pulmonary disease) (HCC)     emphysema    Degeneration of lumbar or lumbosacral intervertebral disc     Depression     DM (diabetes mellitus) (HCC)     Emphysema of lung (HCC)     Gastritis     GERD (gastroesophageal reflux disease)     Hearing loss     Hematuria     Hiatal hernia     HTN (hypertension), benign 6/28/2014    Hypertriglyceridemia     Incontinence of urine 9/25/2013    Knee arthropathy     Lumbosacral spondylosis without myelopathy 9/29/2014    Migraine headache     DR. GRACIA    Mumps     Neuropathy     Obesity     On home oxygen therapy     2 L PER NC  HS AND PRN    HIGINIO on CPAP     Otitis media 1-26-15    Stroke (HCC)     QUESTIONABLE    Tinnitus     Type 2 diabetes mellitus without complication (HCC)     Type II or unspecified type diabetes mellitus without mention of complication, not stated as uncontrolled     UTI (lower urinary tract infection)     Varicose vein        Past Surgical History:   Procedure Laterality Date    ABLATION OF DYSRHYTHMIC FOCUS  2000    CARPAL TUNNEL RELEASE Right     CATARACT REMOVAL WITH IMPLANT Bilateral     CHOLECYSTECTOMY  2007    COLONOSCOPY      COLONOSCOPY  04/10/2017    tubular adenomo polyp , mod. sigmoid diverticulosis, min. int. hemorrhoids    CYSTOSCOPY  9/25/2013    DILATION AND CURETTAGE  1979    EYE Instructed not to drive or operate machinery if drowsy     I also discussed with the patient regarding the dangers of combining narcotic pain medication with tranquilizers, alcohol or illegal drugs or taking the medication any way other than prescribed. The dangers were discussed  including respiratory depression and death. Patient was told to tell  all  physicians regarding the medications he is getting from pain clinic. Patient is warned not to take any unprescribed medications over-the-counter medications that can depress breathing . Patient is advised to talk to the pharmacist or physicians if planning to take any over-the-counter medications before  takeing them. Patient is strongly advised to avoid tranquilizers or  relaxants, illegal drugs  or any medications that can depress breathing  Patient is also advised to tell us if there is any changes in their medications from other physicians.     TREATMENT OPTIONS:     Return in 4 weeks  Medication Agreement Requirements Met  Continue Opioid therapy  Script written for percocet  Follow up appointment made

## 2018-09-24 ENCOUNTER — APPOINTMENT (OUTPATIENT)
Dept: GENERAL RADIOLOGY | Age: 68
End: 2018-09-24
Payer: MEDICARE

## 2018-09-24 ENCOUNTER — HOSPITAL ENCOUNTER (OUTPATIENT)
Age: 68
Setting detail: OBSERVATION
Discharge: HOME | End: 2018-09-25
Attending: EMERGENCY MEDICINE | Admitting: FAMILY MEDICINE
Payer: MEDICARE

## 2018-09-24 ENCOUNTER — APPOINTMENT (OUTPATIENT)
Dept: CT IMAGING | Age: 68
End: 2018-09-24
Payer: MEDICARE

## 2018-09-24 DIAGNOSIS — R11.0 NAUSEA: ICD-10-CM

## 2018-09-24 DIAGNOSIS — R06.02 SHORTNESS OF BREATH: ICD-10-CM

## 2018-09-24 DIAGNOSIS — R94.31 EKG ABNORMALITY: ICD-10-CM

## 2018-09-24 DIAGNOSIS — R07.9 CHEST PAIN, UNSPECIFIED TYPE: Primary | ICD-10-CM

## 2018-09-24 LAB
ABSOLUTE EOS #: 0.13 K/UL (ref 0–0.44)
ABSOLUTE IMMATURE GRANULOCYTE: 0.04 K/UL (ref 0–0.3)
ABSOLUTE LYMPH #: 2.29 K/UL (ref 1.1–3.7)
ABSOLUTE MONO #: 0.81 K/UL (ref 0.1–1.2)
ANION GAP SERPL CALCULATED.3IONS-SCNC: 10 MMOL/L (ref 9–17)
BASOPHILS # BLD: 1 % (ref 0–2)
BASOPHILS ABSOLUTE: 0.06 K/UL (ref 0–0.2)
BUN BLDV-MCNC: 21 MG/DL (ref 8–23)
BUN/CREAT BLD: ABNORMAL (ref 9–20)
CALCIUM SERPL-MCNC: 9.1 MG/DL (ref 8.6–10.4)
CHLORIDE BLD-SCNC: 99 MMOL/L (ref 98–107)
CO2: 27 MMOL/L (ref 20–31)
CREAT SERPL-MCNC: 1.01 MG/DL (ref 0.5–0.9)
D-DIMER QUANTITATIVE: 0.63 MG/L FEU
DIFFERENTIAL TYPE: NORMAL
EKG ATRIAL RATE: 67 BPM
EKG P AXIS: 60 DEGREES
EKG P-R INTERVAL: 130 MS
EKG Q-T INTERVAL: 376 MS
EKG QRS DURATION: 66 MS
EKG QTC CALCULATION (BAZETT): 397 MS
EKG R AXIS: -30 DEGREES
EKG T AXIS: 99 DEGREES
EKG VENTRICULAR RATE: 67 BPM
EOSINOPHILS RELATIVE PERCENT: 2 % (ref 1–4)
GFR AFRICAN AMERICAN: >60 ML/MIN
GFR NON-AFRICAN AMERICAN: 55 ML/MIN
GFR SERPL CREATININE-BSD FRML MDRD: ABNORMAL ML/MIN/{1.73_M2}
GFR SERPL CREATININE-BSD FRML MDRD: ABNORMAL ML/MIN/{1.73_M2}
GLUCOSE BLD-MCNC: 113 MG/DL (ref 70–99)
GLUCOSE BLD-MCNC: 184 MG/DL (ref 65–105)
HCT VFR BLD CALC: 41.4 % (ref 36.3–47.1)
HEMOGLOBIN: 13 G/DL (ref 11.9–15.1)
IMMATURE GRANULOCYTES: 0 %
LYMPHOCYTES # BLD: 26 % (ref 24–43)
MCH RBC QN AUTO: 30 PG (ref 25.2–33.5)
MCHC RBC AUTO-ENTMCNC: 31.4 G/DL (ref 28.4–34.8)
MCV RBC AUTO: 95.6 FL (ref 82.6–102.9)
MONOCYTES # BLD: 9 % (ref 3–12)
NRBC AUTOMATED: 0 PER 100 WBC
PDW BLD-RTO: 13.1 % (ref 11.8–14.4)
PLATELET # BLD: 223 K/UL (ref 138–453)
PLATELET ESTIMATE: NORMAL
PMV BLD AUTO: 11.1 FL (ref 8.1–13.5)
POC TROPONIN I: 0 NG/ML (ref 0–0.1)
POC TROPONIN I: 0.01 NG/ML (ref 0–0.1)
POC TROPONIN INTERP: NORMAL
POC TROPONIN INTERP: NORMAL
POTASSIUM SERPL-SCNC: 4.5 MMOL/L (ref 3.7–5.3)
RBC # BLD: 4.33 M/UL (ref 3.95–5.11)
RBC # BLD: NORMAL 10*6/UL
SEG NEUTROPHILS: 62 % (ref 36–65)
SEGMENTED NEUTROPHILS ABSOLUTE COUNT: 5.6 K/UL (ref 1.5–8.1)
SODIUM BLD-SCNC: 136 MMOL/L (ref 135–144)
TROPONIN INTERP: NORMAL
TROPONIN T: <0.03 NG/ML
WBC # BLD: 8.9 K/UL (ref 3.5–11.3)
WBC # BLD: NORMAL 10*3/UL

## 2018-09-24 PROCEDURE — 6360000004 HC RX CONTRAST MEDICATION: Performed by: EMERGENCY MEDICINE

## 2018-09-24 PROCEDURE — 6360000002 HC RX W HCPCS: Performed by: EMERGENCY MEDICINE

## 2018-09-24 PROCEDURE — 82947 ASSAY GLUCOSE BLOOD QUANT: CPT

## 2018-09-24 PROCEDURE — 94762 N-INVAS EAR/PLS OXIMTRY CONT: CPT

## 2018-09-24 PROCEDURE — 93005 ELECTROCARDIOGRAM TRACING: CPT

## 2018-09-24 PROCEDURE — 71260 CT THORAX DX C+: CPT

## 2018-09-24 PROCEDURE — 84484 ASSAY OF TROPONIN QUANT: CPT

## 2018-09-24 PROCEDURE — 96374 THER/PROPH/DIAG INJ IV PUSH: CPT

## 2018-09-24 PROCEDURE — G0378 HOSPITAL OBSERVATION PER HR: HCPCS

## 2018-09-24 PROCEDURE — 96375 TX/PRO/DX INJ NEW DRUG ADDON: CPT

## 2018-09-24 PROCEDURE — 71046 X-RAY EXAM CHEST 2 VIEWS: CPT

## 2018-09-24 PROCEDURE — 80048 BASIC METABOLIC PNL TOTAL CA: CPT

## 2018-09-24 PROCEDURE — 85025 COMPLETE CBC W/AUTO DIFF WBC: CPT

## 2018-09-24 PROCEDURE — 85379 FIBRIN DEGRADATION QUANT: CPT

## 2018-09-24 PROCEDURE — 94640 AIRWAY INHALATION TREATMENT: CPT

## 2018-09-24 PROCEDURE — 94664 DEMO&/EVAL PT USE INHALER: CPT

## 2018-09-24 PROCEDURE — 99285 EMERGENCY DEPT VISIT HI MDM: CPT

## 2018-09-24 PROCEDURE — 6370000000 HC RX 637 (ALT 250 FOR IP): Performed by: EMERGENCY MEDICINE

## 2018-09-24 PROCEDURE — 96376 TX/PRO/DX INJ SAME DRUG ADON: CPT

## 2018-09-24 PROCEDURE — 96372 THER/PROPH/DIAG INJ SC/IM: CPT

## 2018-09-24 RX ORDER — ALBUTEROL SULFATE 90 UG/1
2 AEROSOL, METERED RESPIRATORY (INHALATION)
Status: DISCONTINUED | OUTPATIENT
Start: 2018-09-24 | End: 2018-09-24

## 2018-09-24 RX ORDER — PANTOPRAZOLE SODIUM 40 MG/1
40 TABLET, DELAYED RELEASE ORAL 2 TIMES DAILY
Status: DISCONTINUED | OUTPATIENT
Start: 2018-09-24 | End: 2018-09-25 | Stop reason: HOSPADM

## 2018-09-24 RX ORDER — ACETAMINOPHEN 325 MG/1
650 TABLET ORAL EVERY 4 HOURS PRN
Status: DISCONTINUED | OUTPATIENT
Start: 2018-09-24 | End: 2018-09-25 | Stop reason: HOSPADM

## 2018-09-24 RX ORDER — FAMOTIDINE 20 MG/1
20 TABLET, FILM COATED ORAL 2 TIMES DAILY
Status: DISCONTINUED | OUTPATIENT
Start: 2018-09-24 | End: 2018-09-25 | Stop reason: HOSPADM

## 2018-09-24 RX ORDER — ALBUTEROL SULFATE 2.5 MG/3ML
2.5 SOLUTION RESPIRATORY (INHALATION)
Status: DISCONTINUED | OUTPATIENT
Start: 2018-09-24 | End: 2018-09-25 | Stop reason: HOSPADM

## 2018-09-24 RX ORDER — POTASSIUM CHLORIDE 7.45 MG/ML
10 INJECTION INTRAVENOUS PRN
Status: DISCONTINUED | OUTPATIENT
Start: 2018-09-24 | End: 2018-09-25 | Stop reason: HOSPADM

## 2018-09-24 RX ORDER — CARVEDILOL 3.12 MG/1
3.12 TABLET ORAL 2 TIMES DAILY WITH MEALS
Status: DISCONTINUED | OUTPATIENT
Start: 2018-09-25 | End: 2018-09-25 | Stop reason: HOSPADM

## 2018-09-24 RX ORDER — INSULIN GLARGINE 100 [IU]/ML
35 INJECTION, SOLUTION SUBCUTANEOUS NIGHTLY
Status: DISCONTINUED | OUTPATIENT
Start: 2018-09-25 | End: 2018-09-25 | Stop reason: HOSPADM

## 2018-09-24 RX ORDER — METHYLPREDNISOLONE SODIUM SUCCINATE 125 MG/2ML
40 INJECTION, POWDER, LYOPHILIZED, FOR SOLUTION INTRAMUSCULAR; INTRAVENOUS ONCE
Status: COMPLETED | OUTPATIENT
Start: 2018-09-24 | End: 2018-09-24

## 2018-09-24 RX ORDER — LOSARTAN POTASSIUM 25 MG/1
25 TABLET ORAL DAILY
Status: DISCONTINUED | OUTPATIENT
Start: 2018-09-25 | End: 2018-09-25 | Stop reason: HOSPADM

## 2018-09-24 RX ORDER — CLOPIDOGREL BISULFATE 75 MG/1
75 TABLET ORAL DAILY
Status: DISCONTINUED | OUTPATIENT
Start: 2018-09-25 | End: 2018-09-25 | Stop reason: HOSPADM

## 2018-09-24 RX ORDER — ATORVASTATIN CALCIUM 40 MG/1
40 TABLET, FILM COATED ORAL NIGHTLY
Status: DISCONTINUED | OUTPATIENT
Start: 2018-09-24 | End: 2018-09-25 | Stop reason: HOSPADM

## 2018-09-24 RX ORDER — MAGNESIUM SULFATE 1 G/100ML
1 INJECTION INTRAVENOUS PRN
Status: DISCONTINUED | OUTPATIENT
Start: 2018-09-24 | End: 2018-09-25 | Stop reason: HOSPADM

## 2018-09-24 RX ORDER — SODIUM CHLORIDE 0.9 % (FLUSH) 0.9 %
10 SYRINGE (ML) INJECTION EVERY 12 HOURS SCHEDULED
Status: DISCONTINUED | OUTPATIENT
Start: 2018-09-24 | End: 2018-09-25 | Stop reason: HOSPADM

## 2018-09-24 RX ORDER — BISACODYL 10 MG
10 SUPPOSITORY, RECTAL RECTAL DAILY PRN
Status: DISCONTINUED | OUTPATIENT
Start: 2018-09-24 | End: 2018-09-25 | Stop reason: HOSPADM

## 2018-09-24 RX ORDER — ONDANSETRON 2 MG/ML
4 INJECTION INTRAMUSCULAR; INTRAVENOUS EVERY 6 HOURS PRN
Status: DISCONTINUED | OUTPATIENT
Start: 2018-09-24 | End: 2018-09-25 | Stop reason: HOSPADM

## 2018-09-24 RX ORDER — ASPIRIN 81 MG/1
324 TABLET, CHEWABLE ORAL ONCE
Status: COMPLETED | OUTPATIENT
Start: 2018-09-24 | End: 2018-09-24

## 2018-09-24 RX ORDER — IPRATROPIUM BROMIDE AND ALBUTEROL SULFATE 2.5; .5 MG/3ML; MG/3ML
1 SOLUTION RESPIRATORY (INHALATION)
Status: DISCONTINUED | OUTPATIENT
Start: 2018-09-24 | End: 2018-09-24

## 2018-09-24 RX ORDER — DOCUSATE SODIUM 100 MG/1
100 CAPSULE, LIQUID FILLED ORAL 2 TIMES DAILY
Status: DISCONTINUED | OUTPATIENT
Start: 2018-09-24 | End: 2018-09-25 | Stop reason: HOSPADM

## 2018-09-24 RX ORDER — LANOLIN ALCOHOL/MO/W.PET/CERES
325 CREAM (GRAM) TOPICAL
Status: DISCONTINUED | OUTPATIENT
Start: 2018-09-25 | End: 2018-09-25 | Stop reason: HOSPADM

## 2018-09-24 RX ORDER — DEXTROSE MONOHYDRATE 25 G/50ML
12.5 INJECTION, SOLUTION INTRAVENOUS PRN
Status: DISCONTINUED | OUTPATIENT
Start: 2018-09-24 | End: 2018-09-25 | Stop reason: HOSPADM

## 2018-09-24 RX ORDER — NITROGLYCERIN 0.4 MG/1
0.4 TABLET SUBLINGUAL EVERY 5 MIN PRN
Status: DISCONTINUED | OUTPATIENT
Start: 2018-09-24 | End: 2018-09-25 | Stop reason: HOSPADM

## 2018-09-24 RX ORDER — POTASSIUM CHLORIDE 20MEQ/15ML
40 LIQUID (ML) ORAL PRN
Status: DISCONTINUED | OUTPATIENT
Start: 2018-09-24 | End: 2018-09-25 | Stop reason: HOSPADM

## 2018-09-24 RX ORDER — NICOTINE POLACRILEX 4 MG
15 LOZENGE BUCCAL PRN
Status: DISCONTINUED | OUTPATIENT
Start: 2018-09-24 | End: 2018-09-25 | Stop reason: HOSPADM

## 2018-09-24 RX ORDER — LANOLIN ALCOHOL/MO/W.PET/CERES
1000 CREAM (GRAM) TOPICAL DAILY
Status: ON HOLD | COMMUNITY
End: 2019-06-14

## 2018-09-24 RX ORDER — DIPHENHYDRAMINE HYDROCHLORIDE 50 MG/ML
50 INJECTION INTRAMUSCULAR; INTRAVENOUS ONCE
Status: COMPLETED | OUTPATIENT
Start: 2018-09-24 | End: 2018-09-24

## 2018-09-24 RX ORDER — SODIUM CHLORIDE 0.9 % (FLUSH) 0.9 %
10 SYRINGE (ML) INJECTION PRN
Status: DISCONTINUED | OUTPATIENT
Start: 2018-09-24 | End: 2018-09-25 | Stop reason: HOSPADM

## 2018-09-24 RX ORDER — INSULIN GLARGINE 100 [IU]/ML
45 INJECTION, SOLUTION SUBCUTANEOUS EVERY MORNING
Status: DISCONTINUED | OUTPATIENT
Start: 2018-09-25 | End: 2018-09-25 | Stop reason: HOSPADM

## 2018-09-24 RX ORDER — ALBUTEROL SULFATE 2.5 MG/3ML
5 SOLUTION RESPIRATORY (INHALATION)
Status: DISCONTINUED | OUTPATIENT
Start: 2018-09-24 | End: 2018-09-24

## 2018-09-24 RX ORDER — POTASSIUM CHLORIDE 20 MEQ/1
40 TABLET, EXTENDED RELEASE ORAL PRN
Status: DISCONTINUED | OUTPATIENT
Start: 2018-09-24 | End: 2018-09-25 | Stop reason: HOSPADM

## 2018-09-24 RX ORDER — FENTANYL CITRATE 50 UG/ML
100 INJECTION, SOLUTION INTRAMUSCULAR; INTRAVENOUS ONCE
Status: COMPLETED | OUTPATIENT
Start: 2018-09-24 | End: 2018-09-24

## 2018-09-24 RX ORDER — ISOSORBIDE MONONITRATE 30 MG/1
30 TABLET, EXTENDED RELEASE ORAL DAILY
Status: DISCONTINUED | OUTPATIENT
Start: 2018-09-25 | End: 2018-09-25 | Stop reason: HOSPADM

## 2018-09-24 RX ORDER — CITALOPRAM 20 MG/1
40 TABLET ORAL DAILY
Status: DISCONTINUED | OUTPATIENT
Start: 2018-09-25 | End: 2018-09-25 | Stop reason: HOSPADM

## 2018-09-24 RX ORDER — DEXAMETHASONE SODIUM PHOSPHATE 10 MG/ML
10 INJECTION INTRAMUSCULAR; INTRAVENOUS ONCE
Status: COMPLETED | OUTPATIENT
Start: 2018-09-24 | End: 2018-09-24

## 2018-09-24 RX ORDER — FUROSEMIDE 20 MG/1
20 TABLET ORAL PRN
Status: DISCONTINUED | OUTPATIENT
Start: 2018-09-24 | End: 2018-09-25 | Stop reason: HOSPADM

## 2018-09-24 RX ORDER — DOCUSATE SODIUM 100 MG/1
100 CAPSULE, LIQUID FILLED ORAL 2 TIMES DAILY
Status: DISCONTINUED | OUTPATIENT
Start: 2018-09-24 | End: 2018-09-24 | Stop reason: SDUPTHER

## 2018-09-24 RX ORDER — AMLODIPINE BESYLATE 5 MG/1
5 TABLET ORAL NIGHTLY
Status: DISCONTINUED | OUTPATIENT
Start: 2018-09-24 | End: 2018-09-25 | Stop reason: HOSPADM

## 2018-09-24 RX ORDER — DEXTROSE MONOHYDRATE 50 MG/ML
100 INJECTION, SOLUTION INTRAVENOUS PRN
Status: DISCONTINUED | OUTPATIENT
Start: 2018-09-24 | End: 2018-09-25 | Stop reason: HOSPADM

## 2018-09-24 RX ORDER — HYDROCODONE BITARTRATE AND ACETAMINOPHEN 5; 325 MG/1; MG/1
1 TABLET ORAL EVERY 4 HOURS PRN
Status: DISCONTINUED | OUTPATIENT
Start: 2018-09-24 | End: 2018-09-25

## 2018-09-24 RX ADMIN — ALBUTEROL SULFATE 5 MG: 5 SOLUTION RESPIRATORY (INHALATION) at 16:08

## 2018-09-24 RX ADMIN — IOPAMIDOL 75 ML: 755 INJECTION, SOLUTION INTRAVENOUS at 22:16

## 2018-09-24 RX ADMIN — DIPHENHYDRAMINE HYDROCHLORIDE 50 MG: 50 INJECTION, SOLUTION INTRAMUSCULAR; INTRAVENOUS at 20:52

## 2018-09-24 RX ADMIN — METHYLPREDNISOLONE SODIUM SUCCINATE 40 MG: 125 INJECTION, POWDER, FOR SOLUTION INTRAMUSCULAR; INTRAVENOUS at 21:54

## 2018-09-24 RX ADMIN — IPRATROPIUM BROMIDE 0.5 MG: 0.5 SOLUTION RESPIRATORY (INHALATION) at 16:08

## 2018-09-24 RX ADMIN — FENTANYL CITRATE 100 MCG: 50 INJECTION INTRAMUSCULAR; INTRAVENOUS at 16:14

## 2018-09-24 RX ADMIN — METHYLPREDNISOLONE SODIUM SUCCINATE 40 MG: 125 INJECTION, POWDER, FOR SOLUTION INTRAMUSCULAR; INTRAVENOUS at 17:57

## 2018-09-24 RX ADMIN — ASPIRIN 81 MG 324 MG: 81 TABLET ORAL at 16:13

## 2018-09-24 RX ADMIN — DEXAMETHASONE SODIUM PHOSPHATE 10 MG: 10 INJECTION INTRAMUSCULAR; INTRAVENOUS at 16:14

## 2018-09-24 RX ADMIN — ENOXAPARIN SODIUM 105 MG: 120 INJECTION SUBCUTANEOUS at 20:51

## 2018-09-24 ASSESSMENT — PAIN DESCRIPTION - LOCATION
LOCATION: CHEST
LOCATION: CHEST

## 2018-09-24 ASSESSMENT — PAIN DESCRIPTION - PAIN TYPE
TYPE: ACUTE PAIN
TYPE: ACUTE PAIN

## 2018-09-24 ASSESSMENT — HEART SCORE: ECG: 1

## 2018-09-24 ASSESSMENT — PAIN DESCRIPTION - DESCRIPTORS: DESCRIPTORS: CONSTANT;HEAVINESS

## 2018-09-24 ASSESSMENT — PAIN DESCRIPTION - ORIENTATION: ORIENTATION: MID;LEFT

## 2018-09-24 ASSESSMENT — PAIN SCALES - GENERAL
PAINLEVEL_OUTOF10: 7
PAINLEVEL_OUTOF10: 8
PAINLEVEL_OUTOF10: 7

## 2018-09-24 NOTE — ED NOTES
Pt monitor alarms, writer entered room, pt observed resting with eyes closed and shallow RR noted, pt awoke with gental  touch and verbal stimuli. Pt STS she treats for sleep apnea. O2 3L NC placed. Pt quickly compensated and remained alert ox3 and cooperative. Dr Latia Yates updated.        Carol Burr, REUBEN  09/24/18 4421

## 2018-09-24 NOTE — ED NOTES
Initial troponin POCT did not result into chart, unable to locate paperwork charting the result. Troponin ordered on initial labs specimen in lab and then at his time. Dr Stella Hurd updated, lab notified.      Naz Otero RN  09/24/18 [id number]

## 2018-09-24 NOTE — ED NOTES
Pt updated on pending CTA, pt updated on pre medication to be given for CTA. Pt verbalized understanding of procedure. Brian Polanco RN  09/24/18 [id number]

## 2018-09-24 NOTE — ED PROVIDER NOTES
failure) ([facility] Chronic kidney disease; COPD (chronic obstructive pulmonary disease) ([facility] Degeneration of lumbar or lumbosacral intervertebral disc; Depression; DM (diabetes mellitus) ([facility] Emphysema of lung ([facility] Gastritis; GERD (gastroesophageal reflux disease); Hearing loss; Hematuria; Hiatal hernia; HTN (hypertension), benign; Hypertriglyceridemia; Incontinence of urine; Knee arthropathy; Lumbosacral spondylosis without myelopathy; Migraine headache; Mumps; Neuropathy; Obesity; On home oxygen therapy; HIGINIO on CPAP; Otitis media; Stroke ([facility] Tinnitus; Type 2 diabetes mellitus without complication ([facility] Type II or unspecified type diabetes mellitus without mention of complication, not stated as uncontrolled; UTI (lower urinary tract infection); and Varicose vein. has a past surgical history that includes Cholecystectomy (2007); Carpal tunnel release (Right); Tympanoplasty; Dilation & curettage (1979); Cystocopy (9/25/2013); Cataract removal with implant (Bilateral); Tonsillectomy; Incontinence surgery; ablation of dysrhythmic focus (2000); Upper gastrointestinal endoscopy (03/2016); eye surgery; Tubal ligation; other surgical history (09/10/15); Insertable Cardiac Monitor (12/2015); Tympanoplasty; Colonoscopy; Colonoscopy (04/10/2017); pr colsc flx w/removal lesion by hot bx forceps (N/A, 4/10/2017); and Tympanostomy tube placement (08/21/2017).     Social History     Social History    Marital status: Single     Spouse name: N/A    Number of children: N/A    Years of education: N/A     Occupational History    disabled N/A     Social History Main Topics    Smoking status: Former Smoker     Packs/day: 3.00     Years: 41.00     Types: Cigarettes     Quit date: 1/1/2000    Smokeless tobacco: Never Used      Comment: quit in 2000    Alcohol use No    Drug use: No    Sexual activity: Not Currently     Other Topics Concern    Not on file     Social History Narrative    No narrative on file Family History   Problem Relation Age of Onset    Diabetes Mother     Heart Disease Mother     Stomach Cancer Father     Diabetes Maternal Grandmother         also aunts and uncles (maternal)    Emphysema Sister        Allergies:  Robitussin [guaifenesin]; Codeine; Compazine [prochlorperazine maleate]; Iodides; Morphine; Moxifloxacin; Reglan [metoclopramide]; Avelox [moxifloxacin hcl in nacl]; Cipro xr; and Sulfa antibiotics    Home Medications:  Prior to Admission medications    Medication Sig Start Date End Date Taking? Authorizing Provider   vitamin B-12 (CYANOCOBALAMIN) 1000 MCG tablet Take 1,000 mcg by mouth daily   Yes Historical Provider, MD   carvedilol (COREG) 3.125 MG tablet TAKE 1 TAB BY MOUTH TWICE A DAY 9/19/18  Yes Marilee Lai MD   oxyCODONE-acetaminophen (PERCOCET) 5-325 MG per tablet Take 1 tablet by mouth See Admin Instructions for 30 days. 1 tab every 6-8 hours prn. 9/13/18 10/13/18 Yes VERONIKA Hernandez - CNP   nitroGLYCERIN (NITROSTAT) 0.4 MG SL tablet 1 under the tongue as needed for angina, may repeat q5mins for up three doses 8/28/18  Yes Historical Provider, MD   tiZANidine (ZANAFLEX) 2 MG tablet Take 1 tablet by mouth every 12 hours as needed (back pain) Causes sedation, do not drive while taking this medication. Short term.  7/17/18  Yes Tommie Anderson MD   pantoprazole (PROTONIX) 40 MG tablet Take 1 tablet by mouth 2 times daily 7/16/18  Yes Tommie Anderson MD   ranitidine (ZANTAC) 150 MG tablet TAKE 1 TAB BY MOUTH TWICE A DAY 6/8/18  Yes Marilee Lai MD   PROAIR  (06 Base) MCG/ACT inhaler INHALE 2 PUFFS BY INHALATION ROUTE 3 TIMES A DAY FOR 30 DAYS 5/7/18  Yes Marilee Lai MD   metFORMIN (GLUCOPHAGE) 1000 MG tablet TAKE 1 TAB BY MOUTH TWICE DAILY 3/21/18  Yes Marilee Lai MD   sucralfate (CARAFATE) 1 GM tablet Take 1 tablet by mouth 4 times daily 2/14/18  Yes Marilee Lai MD   ipratropium-albuterol (DUONEB) 0.5-2.5 (3) MG/3ML SOLN nebulizer solution Inhale 3 mLs into the lungs every 4 hours 2/6/18  Yes Jack Franco MD   losartan (COZAAR) 25 MG tablet TAKE 1 TAB BY MOUTH ONCE A DAY 12/4/17  Yes Dontae Bocanegra MD   topiramate (TOPAMAX) 100 MG tablet Take 1 tablet by mouth nightly 11/29/17  Yes Les Morales MD   Melatonin 10 MG TABS Take 10 mg by mouth nightly 10/19/17  Yes Dontae Bocanegra MD   furosemide (LASIX) 20 MG tablet TAKE 1 TABLET BY MOUTH AS NEEDED (LEG SWELLING) 10/18/17  Yes Dontae Bocanegra MD   citalopram (CELEXA) 40 MG tablet TAKE 1 TAB BY MOUTH ONCE A DAY 10/18/17  Yes Dontae Bocanegra MD   clopidogrel (PLAVIX) 75 MG tablet TAKE 1 TAB BY MOUTH ONCE A DAY 10/18/17  Yes Dontae Bocanegra MD   insulin glargine (LANTUS SOLOSTAR) 100 UNIT/ML injection pen Inject 45 Units into the skin every morning  Patient taking differently: Inject 45 Units into the skin every morning 45 U in the morning and 35 U in the evening 7/13/17  Yes Pa Burton MD   amLODIPine (NORVASC) 5 MG tablet TAKE 1 TAB BY MOUTH ONCE A DAY 6/29/17  Yes Flex Dennis MD   isosorbide mononitrate (IMDUR) 30 MG extended release tablet TAKE 1 TABLET BY MOUTH DAILY 9/30/16  Yes Flex Dennis MD   ferrous sulfate 325 (65 FE) MG tablet Take 325 mg by mouth daily (with breakfast)   Yes Historical Provider, MD   docusate sodium (COLACE) 100 MG capsule Take 1 capsule by mouth 2 times daily Please use while taking Percocet 9/10/15  Yes Zoie Hall MD   SYMBICORT 160-4.5 MCG/ACT AERO   2 puffs As needed for sob 5/28/15  Yes Historical Provider, MD   magnesium oxide (MAG-OX) 400 MG tablet Take 400 mg by mouth 2 times daily. Yes Historical Provider, MD   nystatin (MYCOSTATIN) powder Apply 3 times daily. 7/30/13  Yes Adamaris Man MD   gabapentin (NEURONTIN) 300 MG capsule Take 1 capsule by mouth 2 times daily for 30 days. . 8/29/18 9/28/18  Dontae Bocanegra MD   zoster recombinant adjuvanted vaccine (SHINGRIX) 50 MCG SUSR injection 50 MCG IM then repeat 2-6 months.  8/7/18 8/7/19 STATUS (FROM ED OR OR/PROCEDURAL) Observation       MEDICATIONS ORDERED:  Orders Placed This Encounter   Medications    dexamethasone (DECADRON) injection 10 mg    aspirin chewable tablet 324 mg    fentaNYL (SUBLIMAZE) injection 100 mcg    DISCONTD: albuterol (PROVENTIL) nebulizer solution 5 mg    DISCONTD: ipratropium-albuterol (DUONEB) nebulizer solution 1 ampule    DISCONTD: albuterol (PROVENTIL) nebulizer solution 5 mg    DISCONTD: albuterol sulfate  (90 Base) MCG/ACT inhaler 2 puff    DISCONTD: albuterol sulfate  (90 Base) MCG/ACT inhaler 2 puff    DISCONTD: ipratropium (ATROVENT HFA) 17 MCG/ACT inhaler 2 puff    DISCONTD: ipratropium (ATROVENT) 0.02 % nebulizer solution 0.5 mg    methylPREDNISolone sodium (SOLU-MEDROL) injection 40 mg    enoxaparin (LOVENOX) injection 105 mg    amLODIPine (NORVASC) tablet 5 mg    carvedilol (COREG) tablet 3.125 mg    citalopram (CELEXA) tablet 40 mg    clopidogrel (PLAVIX) tablet 75 mg    docusate sodium (COLACE) capsule 100 mg    ferrous sulfate EC tablet 325 mg    furosemide (LASIX) tablet 20 mg    insulin glargine (LANTUS) injection vial 45 Units    isosorbide mononitrate (IMDUR) extended release tablet 30 mg    losartan (COZAAR) tablet 25 mg    magnesium oxide (MAG-OX) tablet 400 mg    pantoprazole (PROTONIX) tablet 40 mg    famotidine (PEPCID) tablet 20 mg    sodium chloride flush 0.9 % injection 10 mL    sodium chloride flush 0.9 % injection 10 mL    OR Linked Order Group     potassium chloride (KLOR-CON M) extended release tablet 40 mEq     potassium chloride 20 MEQ/15ML (10%) oral solution 40 mEq     potassium chloride 10 mEq/100 mL IVPB (Peripheral Line)    potassium chloride 10 mEq/100 mL IVPB (Peripheral Line)    magnesium sulfate 1 g in dextrose 5% 100 mL IVPB    acetaminophen (TYLENOL) tablet 650 mg    HYDROcodone-acetaminophen (NORCO) 5-325 MG per tablet 1 tablet    magnesium hydroxide (MILK OF MAGNESIA) 400 MG/5ML suspension 30 mL    DISCONTD: docusate sodium (COLACE) capsule 100 mg    bisacodyl (DULCOLAX) suppository 10 mg    ondansetron (ZOFRAN) injection 4 mg    atorvastatin (LIPITOR) tablet 40 mg    glucose (GLUTOSE) 40 % oral gel 15 g    dextrose 50 % solution 12.5 g    glucagon (rDNA) injection 1 mg    dextrose 5 % solution    albuterol (PROVENTIL) nebulizer solution 2.5 mg    aspirin EC tablet 325 mg    enoxaparin (LOVENOX) injection 40 mg    nitroGLYCERIN (NITROSTAT) SL tablet 0.4 mg    insulin lispro (HUMALOG) injection vial 0-12 Units    insulin lispro (HUMALOG) injection vial 0-6 Units    diphenhydrAMINE (BENADRYL) injection 50 mg    methylPREDNISolone sodium (SOLU-MEDROL) injection 40 mg    iopamidol (ISOVUE-370) 76 % injection 75 mL       DDX: ACS, tension pneumothorax pulmonary embolism aortic dissection esophageal rupture.     DIAGNOSTIC RESULTS / EMERGENCY DEPARTMENT COURSE / MDM     LABS:  Results for orders placed or performed during the hospital encounter of 09/24/18   CBC Auto Differential   Result Value Ref Range    WBC 8.9 3.5 - 11.3 k/uL    RBC 4.33 3.95 - 5.11 m/uL    Hemoglobin 13.0 11.9 - 15.1 g/dL    Hematocrit 41.4 36.3 - 47.1 %    MCV 95.6 82.6 - 102.9 fL    MCH 30.0 25.2 - 33.5 pg    MCHC 31.4 28.4 - 34.8 g/dL    RDW 13.1 11.8 - 14.4 %    Platelets 342 741 - 761 k/uL    MPV 11.1 8.1 - 13.5 fL    NRBC Automated 0.0 0.0 per 100 WBC    Differential Type NOT REPORTED     Seg Neutrophils 62 36 - 65 %    Lymphocytes 26 24 - 43 %    Monocytes 9 3 - 12 %    Eosinophils % 2 1 - 4 %    Basophils 1 0 - 2 %    Immature Granulocytes 0 0 %    Segs Absolute 5.60 1.50 - 8.10 k/uL    Absolute Lymph # 2.29 1.10 - 3.70 k/uL    Absolute Mono # 0.81 0.10 - 1.20 k/uL    Absolute Eos # 0.13 0.00 - 0.44 k/uL    Basophils # 0.06 0.00 - 0.20 k/uL    Absolute Immature Granulocyte 0.04 0.00 - 0.30 k/uL    WBC Morphology NOT REPORTED     RBC Morphology NOT REPORTED     Platelet Estimate NOT REPORTED Basic Metabolic Panel   Result Value Ref Range    Glucose 113 (H) 70 - 99 mg/dL    BUN 21 8 - 23 mg/dL    CREATININE 1.01 (H) 0.50 - 0.90 mg/dL    Bun/Cre Ratio NOT REPORTED 9 - 20    Calcium 9.1 8.6 - 10.4 mg/dL    Sodium 136 135 - 144 mmol/L    Potassium 4.5 3.7 - 5.3 mmol/L    Chloride 99 98 - 107 mmol/L    CO2 27 20 - 31 mmol/L    Anion Gap 10 9 - 17 mmol/L    GFR Non-African American 55 (L) >60 mL/min    GFR African American >60 >60 mL/min    GFR Comment          GFR Staging NOT REPORTED    D-Dimer, Quantitative   Result Value Ref Range    D-Dimer, Quant 0.63 mg/L FEU   Troponin   Result Value Ref Range    Troponin T <0.03 <0.03 ng/mL    Troponin Interp         POCT troponin   Result Value Ref Range    POC Troponin I 0.01 0.00 - 0.10 ng/mL    POC Troponin Interp       The Troponin-I (POC) results cannot be compared to the Troponin-T results. POCT troponin   Result Value Ref Range    POC Troponin I 0.00 0.00 - 0.10 ng/mL    POC Troponin Interp       The Troponin-I (POC) results cannot be compared to the Troponin-T results. POC Glucose Fingerstick   Result Value Ref Range    POC Glucose 184 (H) 65 - 105 mg/dL   EKG 12 Lead   Result Value Ref Range    Ventricular Rate 67 BPM    Atrial Rate 67 BPM    P-R Interval 130 ms    QRS Duration 66 ms    Q-T Interval 376 ms    QTc Calculation (Bazett) 397 ms    P Axis 60 degrees    R Axis -30 degrees    T Axis 99 degrees       IMPRESSION: Chest pain    RADIOLOGY:  Xr Chest Standard (2 Vw)    Result Date: 9/24/2018  EXAMINATION: TWO VIEWS OF THE CHEST 9/24/2018 4:44 pm COMPARISON: 07/27/2018 HISTORY: ORDERING SYSTEM PROVIDED HISTORY: chest pain TECHNOLOGIST PROVIDED HISTORY: chest pain FINDINGS: Frontal and lateral views of the chest are submitted for review. The cardiac silhouette is normal in size. Lung parenchyma is clear without focal airspace consolidation, sizeable pleural effusion, or pneumothorax. Loop recorder device projects over the left hemithorax. Trachea is midline. Osseous structures and soft tissues are grossly intact. No evidence for acute cardiopulmonary pathology. Ct Chest Pulmonary Embolism W Contrast    Result Date: 9/24/2018  EXAMINATION: CTA OF THE CHEST 9/24/2018 10:23 pm TECHNIQUE: CTA of the chest was performed after the administration of intravenous contrast.  Multiplanar reformatted images are provided for review. MIP images are provided for review. Dose modulation, iterative reconstruction, and/or weight based adjustment of the mA/kV was utilized to reduce the radiation dose to as low as reasonably achievable. COMPARISON: 06/08/2016 HISTORY: ORDERING SYSTEM PROVIDED HISTORY: pe rule out FINDINGS: Pulmonary Arteries: No evidence of intraluminal filling defect to suggest pulmonary embolism. Main pulmonary artery is normal in caliber. Mediastinum:  Normal cardiac size. Aorta enhances normally and is normal in caliber. No significant mediastinal, hilar or axillary lymphadenopathy. Thyroid gland shows no significant abnormalities. Esophagus shows no significant abnormalities. Lungs/pleura: Lungs show no focal consolidation. A 6 mm solid noncalcified nodule in the middle lobe at the lung bases unchanged. No follow-up is recommended. No pleural effusion or pneumothorax. Central airways unremarkable. Upper Abdomen: Limited images of the upper abdomen show no significant abnormalities. Partially imaged left renal cysts. Cholecystectomy. Soft Tissues/Bones: No acute abnormality of the bones. The superficial soft tissues show no significant abnormalities. 1. No evidence for acute pulmonary embolism. 2. No evidence for pneumonia. 3. Stable 6 mm solid noncalcified middle lobe nodule. No follow-up recommended.        EKG  EKG Interpretation    Rhythm: normal sinus   Rate: normal  Axis: left  Ectopy: none  Conduction: normal  ST Segments: no acute change  T Waves: inversion in 1, avl slightly biphasic in 6   Q Waves: none    Clinical

## 2018-09-25 VITALS
BODY MASS INDEX: 43.61 KG/M2 | DIASTOLIC BLOOD PRESSURE: 66 MMHG | HEART RATE: 62 BPM | TEMPERATURE: 97.9 F | HEIGHT: 62 IN | WEIGHT: 237 LBS | OXYGEN SATURATION: 94 % | SYSTOLIC BLOOD PRESSURE: 112 MMHG | RESPIRATION RATE: 16 BRPM

## 2018-09-25 LAB
ALBUMIN SERPL-MCNC: 4 G/DL (ref 3.5–5.2)
ALBUMIN/GLOBULIN RATIO: 1.3 (ref 1–2.5)
ALP BLD-CCNC: 63 U/L (ref 35–104)
ALT SERPL-CCNC: 10 U/L (ref 5–33)
ANION GAP SERPL CALCULATED.3IONS-SCNC: 15 MMOL/L (ref 9–17)
AST SERPL-CCNC: 10 U/L
BILIRUB SERPL-MCNC: <0.1 MG/DL (ref 0.3–1.2)
BUN BLDV-MCNC: 23 MG/DL (ref 8–23)
BUN/CREAT BLD: ABNORMAL (ref 9–20)
CALCIUM SERPL-MCNC: 8.9 MG/DL (ref 8.6–10.4)
CHLORIDE BLD-SCNC: 95 MMOL/L (ref 98–107)
CHOLESTEROL/HDL RATIO: 9.2
CHOLESTEROL: 385 MG/DL
CO2: 22 MMOL/L (ref 20–31)
CREAT SERPL-MCNC: 1.24 MG/DL (ref 0.5–0.9)
ESTIMATED AVERAGE GLUCOSE: 154 MG/DL
GFR AFRICAN AMERICAN: 52 ML/MIN
GFR NON-AFRICAN AMERICAN: 43 ML/MIN
GFR SERPL CREATININE-BSD FRML MDRD: ABNORMAL ML/MIN/{1.73_M2}
GFR SERPL CREATININE-BSD FRML MDRD: ABNORMAL ML/MIN/{1.73_M2}
GLUCOSE BLD-MCNC: 249 MG/DL (ref 65–105)
GLUCOSE BLD-MCNC: 251 MG/DL (ref 65–105)
GLUCOSE BLD-MCNC: 319 MG/DL (ref 70–99)
HBA1C MFR BLD: 7 % (ref 4–6)
HCT VFR BLD CALC: 39.4 % (ref 36.3–47.1)
HDLC SERPL-MCNC: 42 MG/DL
HEMOGLOBIN: 12.6 G/DL (ref 11.9–15.1)
LDL CHOLESTEROL: 289 MG/DL (ref 0–130)
MAGNESIUM: 1.8 MG/DL (ref 1.6–2.6)
MCH RBC QN AUTO: 30.3 PG (ref 25.2–33.5)
MCHC RBC AUTO-ENTMCNC: 32 G/DL (ref 28.4–34.8)
MCV RBC AUTO: 94.7 FL (ref 82.6–102.9)
NRBC AUTOMATED: 0 PER 100 WBC
PDW BLD-RTO: 13.1 % (ref 11.8–14.4)
PLATELET # BLD: 191 K/UL (ref 138–453)
PMV BLD AUTO: 11.2 FL (ref 8.1–13.5)
POTASSIUM SERPL-SCNC: 4.6 MMOL/L (ref 3.7–5.3)
RBC # BLD: 4.16 M/UL (ref 3.95–5.11)
SODIUM BLD-SCNC: 132 MMOL/L (ref 135–144)
TOTAL PROTEIN: 7 G/DL (ref 6.4–8.3)
TRIGL SERPL-MCNC: 269 MG/DL
TROPONIN INTERP: NORMAL
TROPONIN INTERP: NORMAL
TROPONIN T: <0.03 NG/ML
TROPONIN T: <0.03 NG/ML
VLDLC SERPL CALC-MCNC: ABNORMAL MG/DL (ref 1–30)
WBC # BLD: 9.1 K/UL (ref 3.5–11.3)

## 2018-09-25 PROCEDURE — 82947 ASSAY GLUCOSE BLOOD QUANT: CPT

## 2018-09-25 PROCEDURE — 83036 HEMOGLOBIN GLYCOSYLATED A1C: CPT

## 2018-09-25 PROCEDURE — 80061 LIPID PANEL: CPT

## 2018-09-25 PROCEDURE — 99219 PR INITIAL OBSERVATION CARE/DAY 50 MINUTES: CPT | Performed by: FAMILY MEDICINE

## 2018-09-25 PROCEDURE — 94660 CPAP INITIATION&MGMT: CPT

## 2018-09-25 PROCEDURE — 84484 ASSAY OF TROPONIN QUANT: CPT

## 2018-09-25 PROCEDURE — 83735 ASSAY OF MAGNESIUM: CPT

## 2018-09-25 PROCEDURE — 6360000002 HC RX W HCPCS: Performed by: NURSE PRACTITIONER

## 2018-09-25 PROCEDURE — 80053 COMPREHEN METABOLIC PANEL: CPT

## 2018-09-25 PROCEDURE — 36415 COLL VENOUS BLD VENIPUNCTURE: CPT

## 2018-09-25 PROCEDURE — 6370000000 HC RX 637 (ALT 250 FOR IP): Performed by: FAMILY MEDICINE

## 2018-09-25 PROCEDURE — G0378 HOSPITAL OBSERVATION PER HR: HCPCS

## 2018-09-25 PROCEDURE — 2580000003 HC RX 258: Performed by: NURSE PRACTITIONER

## 2018-09-25 PROCEDURE — 96372 THER/PROPH/DIAG INJ SC/IM: CPT

## 2018-09-25 PROCEDURE — 85027 COMPLETE CBC AUTOMATED: CPT

## 2018-09-25 PROCEDURE — 94762 N-INVAS EAR/PLS OXIMTRY CONT: CPT

## 2018-09-25 PROCEDURE — 6370000000 HC RX 637 (ALT 250 FOR IP): Performed by: NURSE PRACTITIONER

## 2018-09-25 RX ORDER — NAPROXEN 250 MG/1
250 TABLET ORAL 2 TIMES DAILY WITH MEALS
Qty: 8 TABLET | Refills: 0 | COMMUNITY
Start: 2018-09-25 | End: 2019-03-07

## 2018-09-25 RX ORDER — NAPROXEN 250 MG/1
250 TABLET ORAL 2 TIMES DAILY WITH MEALS
Status: DISCONTINUED | OUTPATIENT
Start: 2018-09-25 | End: 2018-09-25 | Stop reason: HOSPADM

## 2018-09-25 RX ORDER — OXYCODONE HYDROCHLORIDE AND ACETAMINOPHEN 5; 325 MG/1; MG/1
2 TABLET ORAL EVERY 4 HOURS PRN
Status: DISCONTINUED | OUTPATIENT
Start: 2018-09-25 | End: 2018-09-25 | Stop reason: HOSPADM

## 2018-09-25 RX ORDER — OXYCODONE HYDROCHLORIDE AND ACETAMINOPHEN 5; 325 MG/1; MG/1
1 TABLET ORAL EVERY 4 HOURS PRN
Status: DISCONTINUED | OUTPATIENT
Start: 2018-09-25 | End: 2018-09-25 | Stop reason: HOSPADM

## 2018-09-25 RX ADMIN — Medication 10 ML: at 12:06

## 2018-09-25 RX ADMIN — INSULIN GLARGINE 45 UNITS: 100 INJECTION, SOLUTION SUBCUTANEOUS at 09:28

## 2018-09-25 RX ADMIN — CITALOPRAM 40 MG: 20 TABLET, FILM COATED ORAL at 09:19

## 2018-09-25 RX ADMIN — FERROUS SULFATE TAB EC 325 MG (65 MG FE EQUIVALENT) 325 MG: 325 (65 FE) TABLET DELAYED RESPONSE at 09:19

## 2018-09-25 RX ADMIN — PANTOPRAZOLE SODIUM 40 MG: 40 TABLET, DELAYED RELEASE ORAL at 00:36

## 2018-09-25 RX ADMIN — DOCUSATE SODIUM 100 MG: 100 CAPSULE, LIQUID FILLED ORAL at 00:36

## 2018-09-25 RX ADMIN — FAMOTIDINE 20 MG: 20 TABLET, FILM COATED ORAL at 09:18

## 2018-09-25 RX ADMIN — INSULIN LISPRO 4 UNITS: 100 INJECTION, SOLUTION INTRAVENOUS; SUBCUTANEOUS at 12:22

## 2018-09-25 RX ADMIN — NAPROXEN 250 MG: 250 TABLET ORAL at 12:04

## 2018-09-25 RX ADMIN — MAGNESIUM GLUCONATE 500 MG ORAL TABLET 400 MG: 500 TABLET ORAL at 00:36

## 2018-09-25 RX ADMIN — CARVEDILOL 3.12 MG: 3.12 TABLET, FILM COATED ORAL at 09:18

## 2018-09-25 RX ADMIN — INSULIN LISPRO 6 UNITS: 100 INJECTION, SOLUTION INTRAVENOUS; SUBCUTANEOUS at 09:24

## 2018-09-25 RX ADMIN — MAGNESIUM GLUCONATE 500 MG ORAL TABLET 400 MG: 500 TABLET ORAL at 09:18

## 2018-09-25 RX ADMIN — FAMOTIDINE 20 MG: 20 TABLET, FILM COATED ORAL at 00:36

## 2018-09-25 RX ADMIN — OXYCODONE HYDROCHLORIDE AND ACETAMINOPHEN 2 TABLET: 5; 325 TABLET ORAL at 09:17

## 2018-09-25 RX ADMIN — LOSARTAN POTASSIUM 25 MG: 25 TABLET, FILM COATED ORAL at 12:05

## 2018-09-25 RX ADMIN — AMLODIPINE BESYLATE 5 MG: 5 TABLET ORAL at 00:36

## 2018-09-25 RX ADMIN — ASPIRIN 325 MG: 325 TABLET, COATED ORAL at 09:18

## 2018-09-25 RX ADMIN — ISOSORBIDE MONONITRATE 30 MG: 30 TABLET ORAL at 12:05

## 2018-09-25 RX ADMIN — ENOXAPARIN SODIUM 40 MG: 40 INJECTION SUBCUTANEOUS at 12:06

## 2018-09-25 RX ADMIN — PANTOPRAZOLE SODIUM 40 MG: 40 TABLET, DELAYED RELEASE ORAL at 09:18

## 2018-09-25 RX ADMIN — OXYCODONE HYDROCHLORIDE AND ACETAMINOPHEN 2 TABLET: 5; 325 TABLET ORAL at 14:37

## 2018-09-25 RX ADMIN — OXYCODONE HYDROCHLORIDE AND ACETAMINOPHEN 2 TABLET: 5; 325 TABLET ORAL at 00:37

## 2018-09-25 RX ADMIN — CLOPIDOGREL 75 MG: 75 TABLET, FILM COATED ORAL at 12:04

## 2018-09-25 RX ADMIN — INSULIN GLARGINE 35 UNITS: 100 INJECTION, SOLUTION SUBCUTANEOUS at 01:25

## 2018-09-25 ASSESSMENT — PAIN SCALES - GENERAL
PAINLEVEL_OUTOF10: 7
PAINLEVEL_OUTOF10: 6
PAINLEVEL_OUTOF10: 9
PAINLEVEL_OUTOF10: 7

## 2018-09-25 ASSESSMENT — ENCOUNTER SYMPTOMS
VOMITING: 0
DIARRHEA: 0
SINUS PRESSURE: 0
SORE THROAT: 0
SHORTNESS OF BREATH: 0
NAUSEA: 0
COUGH: 0
WHEEZING: 0
VOICE CHANGE: 0
ABDOMINAL PAIN: 0
CONSTIPATION: 0
BLOOD IN STOOL: 0

## 2018-09-25 NOTE — H&P
(coronary artery disease) 3/21/2013    Caffeine use     2 coffee/day    CHF (congestive heart failure) (HCC)     Chronic kidney disease     COPD (chronic obstructive pulmonary disease) (HCC)     emphysema    Degeneration of lumbar or lumbosacral intervertebral disc     Depression     DM (diabetes mellitus) (HCC)     Emphysema of lung (HCC)     Gastritis     GERD (gastroesophageal reflux disease)     Hearing loss     Hematuria     Hiatal hernia     HTN (hypertension), benign 6/28/2014    Hypertriglyceridemia     Incontinence of urine 9/25/2013    Knee arthropathy     Lumbosacral spondylosis without myelopathy 9/29/2014    Migraine headache     DR. BASIL [place]     Neuropathy     Obesity     On home oxygen therapy     2 L PER NC  HS AND PRN    HIGINIO on CPAP     Otitis media 1-26-15    Stroke (HCC)     QUESTIONABLE    Tinnitus     Type 2 diabetes mellitus without complication ([facility]     Type II or unspecified type diabetes mellitus without mention of complication, not stated as uncontrolled     UTI (lower urinary tract infection)     Varicose vein         Past Surgical History:     Past Surgical History:   Procedure Laterality Date    ABLATION OF DYSRHYTHMIC FOCUS  2000    CARPAL TUNNEL RELEASE Right     CATARACT REMOVAL WITH IMPLANT Bilateral     CHOLECYSTECTOMY  2007    COLONOSCOPY      COLONOSCOPY  04/10/2017    tubular adenomo polyp , mod. sigmoid diverticulosis, min. int. hemorrhoids    CYSTOSCOPY  9/25/2013    DILATION AND CURETTAGE  1979    EYE SURGERY      laser    INCONTINENCE SURGERY      Percutaneous nerve stimulation Dr Speedy Amaya 2013     INSERTABLE CARDIAC MONITOR  12/2015    OTHER SURGICAL HISTORY  09/10/15    removal of interstim    ND COLSC FLX W/REMOVAL LESION BY HOT BX FORCEPS N/A 4/10/2017    COLONOSCOPY POLYPECTOMY HOT BIOPSY performed by Ignacio aGrcia MD at [address] [place] [address]  TYMPANOSTOMY TUBE PLACEMENT  08/21/2017    UPPER GASTROINTESTINAL ENDOSCOPY  03/2016    Dr Iqra Jensen        Medications Prior to Admission:     Prior to Admission medications    Medication Sig Start Date End Date Taking? Authorizing Provider   vitamin B-12 (CYANOCOBALAMIN) 1000 MCG tablet Take 1,000 mcg by mouth daily   Yes Historical Provider, MD   carvedilol (COREG) 3.125 MG tablet TAKE 1 TAB BY MOUTH TWICE A DAY 9/19/18  Yes Ryan Madison MD   oxyCODONE-acetaminophen (PERCOCET) 5-325 MG per tablet Take 1 tablet by mouth See Admin Instructions for 30 days. 1 tab every 6-8 hours prn. 9/13/18 10/13/18 Yes VERONIKA Salazar - CNP   nitroGLYCERIN (NITROSTAT) 0.4 MG SL tablet 1 under the tongue as needed for angina, may repeat q5mins for up three doses 8/28/18  Yes Historical Provider, MD   tiZANidine (ZANAFLEX) 2 MG tablet Take 1 tablet by mouth every 12 hours as needed (back pain) Causes sedation, do not drive while taking this medication. Short term.  7/17/18  Yes Susanna Hernadez MD   pantoprazole (PROTONIX) 40 MG tablet Take 1 tablet by mouth 2 times daily 7/16/18  Yes Susanna Hernadez MD   ranitidine (ZANTAC) 150 MG tablet TAKE 1 TAB BY MOUTH TWICE A DAY 6/8/18  Yes Ryan Madison MD   PROAIR  (73 Base) MCG/ACT inhaler INHALE 2 PUFFS BY INHALATION ROUTE 3 TIMES A DAY FOR 30 DAYS 5/7/18  Yes Ryan Madison MD   metFORMIN (GLUCOPHAGE) 1000 MG tablet TAKE 1 TAB BY MOUTH TWICE DAILY 3/21/18  Yes Ryan Madison MD   sucralfate (CARAFATE) 1 GM tablet Take 1 tablet by mouth 4 times daily 2/14/18  Yes Ryan Madison MD   ipratropium-albuterol (DUONEB) 0.5-2.5 (3) MG/3ML SOLN nebulizer solution Inhale 3 mLs into the lungs every 4 hours 2/6/18  Yes Hailey Hall MD   losartan (COZAAR) 25 MG tablet TAKE 1 TAB BY MOUTH ONCE A DAY 12/4/17  Yes Ryan Madison MD   topiramate (TOPAMAX) 100 MG tablet Take 1 tablet by mouth nightly 11/29/17  Yes Edmar Looney MD   Melatonin 10 MG TABS Take 10 mg by mouth nightly 10/19/17  Yes Dontae Bocanegra MD   furosemide (LASIX) 20 MG tablet TAKE 1 TABLET BY MOUTH AS NEEDED (LEG SWELLING) 10/18/17  Yes Dontae Bocanegra MD   citalopram (CELEXA) 40 MG tablet TAKE 1 TAB BY MOUTH ONCE A DAY 10/18/17  Yes Dontae Bocanegra MD   clopidogrel (PLAVIX) 75 MG tablet TAKE 1 TAB BY MOUTH ONCE A DAY 10/18/17  Yes Dontae Bocanegra MD   insulin glargine (LANTUS SOLOSTAR) 100 UNIT/ML injection pen Inject 45 Units into the skin every morning  Patient taking differently: Inject 45 Units into the skin every morning 45 U in the morning and 35 U in the evening 7/13/17  Yes Pa Burton MD   amLODIPine (NORVASC) 5 MG tablet TAKE 1 TAB BY MOUTH ONCE A DAY 6/29/17  Yes Flex Dennis MD   isosorbide mononitrate (IMDUR) 30 MG extended release tablet TAKE 1 TABLET BY MOUTH DAILY 9/30/16  Yes Flex Dennis MD   ferrous sulfate 325 (65 FE) MG tablet Take 325 mg by mouth daily (with breakfast)   Yes Historical Provider, MD   docusate sodium (COLACE) 100 MG capsule Take 1 capsule by mouth 2 times daily Please use while taking Percocet 9/10/15  Yes Zoie Hall MD   SYMBICORT 160-4.5 MCG/ACT AERO   2 puffs As needed for sob 5/28/15  Yes Historical Provider, MD   magnesium oxide (MAG-OX) 400 MG tablet Take 400 mg by mouth 2 times daily. Yes Historical Provider, MD   nystatin (MYCOSTATIN) powder Apply 3 times daily. 7/30/13  Yes Adamaris Man MD   gabapentin (NEURONTIN) 300 MG capsule Take 1 capsule by mouth 2 times daily for 30 days. . 8/29/18 9/28/18  Dontae Bocanegra MD   zoster recombinant adjuvanted vaccine (SHINGRIX) 50 MCG SUSR injection 50 MCG IM then repeat 2-6 months.  8/7/18 8/7/19  Dontae Bocanegra MD   BREO ELLIPTA 200-25 MCG/INH AEPB  5/8/18   Historical Provider, MD   Blood Glucose Monitoring Suppl HARMEET Use daily to check BS 3/27/18   Dontae Bocanegra MD   Calcium Carb-Cholecalciferol (OYSCO D) 250-125 MG-UNIT TABS Take 1 tablet by mouth daily 2/14/18   Dontae Bocanegra MD   Compression Stockings MISC POC Troponin I 0.01 0.00 - 0.10 ng/mL    POC Troponin Interp       The Troponin-I (POC) results cannot be compared to the Troponin-T results.    CBC Auto Differential    Collection Time: 09/24/18  4:06 PM   Result Value Ref Range    WBC 8.9 3.5 - 11.3 k/uL    RBC 4.33 3.95 - 5.11 m/uL    Hemoglobin 13.0 11.9 - 15.1 g/dL    Hematocrit 41.4 36.3 - 47.1 %    MCV 95.6 82.6 - 102.9 fL    MCH 30.0 25.2 - 33.5 pg    MCHC 31.4 28.4 - 34.8 g/dL    RDW 13.1 11.8 - 14.4 %    Platelets 672 304 - 311 k/uL    MPV 11.1 8.1 - 13.5 fL    NRBC Automated 0.0 0.0 per 100 WBC    Differential Type NOT REPORTED     Seg Neutrophils 62 36 - 65 %    Lymphocytes 26 24 - 43 %    Monocytes 9 3 - 12 %    Eosinophils % 2 1 - 4 %    Basophils 1 0 - 2 %    Immature Granulocytes 0 0 %    Segs Absolute 5.60 1.50 - 8.10 k/uL    Absolute Lymph # 2.29 1.10 - 3.70 k/uL    Absolute Mono # 0.81 0.10 - 1.20 k/uL    Absolute Eos # 0.13 0.00 - 0.44 k/uL    Basophils # 0.06 0.00 - 0.20 k/uL    Absolute Immature Granulocyte 0.04 0.00 - 0.30 k/uL    WBC Morphology NOT REPORTED     RBC Morphology NOT REPORTED     Platelet Estimate NOT REPORTED    Basic Metabolic Panel    Collection Time: 09/24/18  4:06 PM   Result Value Ref Range    Glucose 113 (H) 70 - 99 mg/dL    BUN 21 8 - 23 mg/dL    CREATININE 1.01 (H) 0.50 - 0.90 mg/dL    Bun/Cre Ratio NOT REPORTED 9 - 20    Calcium 9.1 8.6 - 10.4 mg/dL    Sodium 136 135 - 144 mmol/L    Potassium 4.5 3.7 - 5.3 mmol/L    Chloride 99 98 - 107 mmol/L    CO2 27 20 - 31 mmol/L    Anion Gap 10 9 - 17 mmol/L    GFR Non-African American 55 (L) >60 mL/min    GFR African American >60 >60 mL/min    GFR Comment          GFR Staging NOT REPORTED    D-Dimer, Quantitative    Collection Time: 09/24/18  4:06 PM   Result Value Ref Range    D-Dimer, Quant 0.63 mg/L FEU   Troponin    Collection Time: 09/24/18  4:06 PM   Result Value Ref Range    Troponin T <0.03 <0.03 ng/mL    Troponin Interp         POCT troponin    Collection Time: 09/24/18 138 - 453 k/uL    MPV 11.2 8.1 - 13.5 fL    NRBC Automated 0.0 0.0 per 100 WBC   Troponin    Collection Time: 09/25/18  4:26 AM   Result Value Ref Range    Troponin T <0.03 <0.03 ng/mL    Troponin Interp         POC Glucose Fingerstick    Collection Time: 09/25/18  7:51 AM   Result Value Ref Range    POC Glucose 251 (H) 65 - 105 mg/dL       Imaging/Diagonstics:  Echo 6/6/16 - EF 50 - 55 % , trace MR, estimated RSVP 30 mm Hg   Stress test 7/28/18   Normal NM and electrographic stress test .       Assessment :      Primary Problem  Chest pain    Active Hospital Problems    Diagnosis Date Noted    Chest pain [R07.9] 07/27/2018       Plan:     Patient status Admit as observation in the  Med/Surge    1. Chest Pain - atypical , reproducible. Acute coronary syndrome ruled out with atypical symptoms and negative electrocardiographic, negative troponins. 2. Type 2 DM - well controlled  3. Severe COPD and emphysema - on a Breo Ellipta, Symbicort, ProAir  4. Chronic respiratory failure on home oxygen  5. Morbid Obesity . 6. RML Lung Nodule - stable . No follow up needed . 7. Essential Hypertension - well controlled. Discharge planning home. Short course naproxen for a few days. Side effects discussed. Consultations:   IP CONSULT TO HOSPITALIST  IP CONSULT TO CARDIOLOGY  IP CONSULT TO CARDIOLOGY    Patient is admitted as observation status because of co-morbidities listed above, severity of signs and symptoms as outlined, requirement for current medical therapies and most importantly because of direct risk to patient if care not provided in a hospital setting.     Marichuy Cline MD  9/25/2018    Copy sent to Dr. Sunday Moy MD    (Please note that portions of this note were completed with a voice recognition program. Efforts were made to edit the dictations but occasionally words are mis-transcribed.)

## 2018-09-25 NOTE — DISCHARGE SUMMARY
[facility]      Discharge Summary     Patient ID: Bertram Keating  :  1950   MRN: [id number]     ACCOUNT:  [de-identified]   Patient Location : [facility]  Patient's PCP: Remy Trotter MD  Admit Date: 2018   Discharge Date: 2018     Length of Stay: 0  Code Status:  Prior  Admitting Physician: Yong Johnson MD  Discharge Physician: Yong Johnson MD     Active Discharge Diagnosis :     Primary Problem  Chest pain      [place] Problems    Diagnosis Date Noted    Chest pain [R07.9] 2018       Admission Condition:  fair     Discharged Condition: good    Hospital Stay:     Hospital Course:  Bertram Keating is a 75 y.o. female who was admitted for the management of   Chest pain , presented to ER with Chest Pain (Pt. states chest pain all day. Watching tv and in pain wanted to get checked out. Pt. states pain radiated to the jaw and tongue felt numb. Pt. said it got difficult to swallow. )    Patient reports intermittent  pain in chest for 2 weeks . Pain was on R and left side and worse with movement and cough without any radiation . She denies Any palpitations, dizziness, lightheadedness associated. Patient has been trying Percocet at home which was given for her hemorrhoid pain. Patient did not try nitro for chest pain. She has underlying history of COPD, chronic respiratory failure on home oxygen, Old Afib s/p ablation , type 2 diabetes mellitus. Patient is not physically active and is limited in her ADL. Initial evaluation showed stable vitals, negative troponin, hyperlipidemia. Patient was recently admitted to the hospital and had a negative stress test in 2018. Acute coronary syndrome was ruled out with negative troponins and atypical symptoms. Significant therapeutic interventions:   1. Chest Pain - atypical , reproducible.   Acute coronary syndrome ruled out with atypical symptoms and negative NEEDLES 31G X 8 MM Misc  Generic drug:  Insulin Pen Needle  AS DIRECTED     * Insulin Pen Needle 31G X 6 MM Misc  1 each by Does not apply route daily     vitamin B-12 1000 MCG tablet  Commonly known as:  CYANOCOBALAMIN     zoster recombinant adjuvanted vaccine 50 MCG Susr injection  Commonly known as:  SHINGRIX  50 MCG IM then repeat 2-6 months. * This list has 2 medication(s) that are the same as other medications prescribed for you. Read the directions carefully, and ask your doctor or other care provider to review them with you. Where to Get Your Medications      You can get these medications from any pharmacy    You don't need a prescription for these medications  · naproxen 250 MG tablet         Time Spent on discharge is  40 mins in patient examination, evaluation, counseling as well as medication reconciliation, prescriptions for required medications, discharge plan and follow up. Electronically signed by   Shyam Simmons MD  9/25/2018        Thank you Dr. Antoine Kovacs MD for the opportunity to be involved in this patient's care.

## 2018-09-25 NOTE — CARE COORDINATION
Case Management Initial Discharge Plan  Eduardo Barth,         Readmission Risk              Risk of Unplanned Readmission:        27               Met with:patient to discuss discharge plans. Information verified: address, contacts, phone number, , insurance Yes  PCP: Alan Flores MD  Date of last visit: last week    Insurance Provider: Ramona Valera     Discharge Planning  Current Residence:  Private Residence  Living Arrangements:  Alone   Home has 8 stories/none stairs to climb  Support Systems:  Family Members  Current Services PTA:  C-pap, Durable Medical Equipment, [employer] (RN every 2-3 months for assess aide 3x's/week for 2 hours) Supplier: [employer]  Patient able to perform ADL's:Assisted  DME used to aid ambulation prior to admission: walker/during admission needs walker    Potential Assistance Needed:  N/A    Pharmacy: [facility]  Potential Assistance Purchasing Medications:  No  Does patient want to participate in local refill/ meds to beds program?  No    Patient agreeable to home care: Yes  Freedom of choice provided:  yes      Type of Home Care Services:  [facility]  Patient expects to be discharged to:  Private residence    Prior SNF/Rehab Placement and Facility: yes  Agreeable to SNF/Rehab: Yes  [place] of choice provided: n/a   Evaluation: no    Expected Discharge date:  18  Follow Up Appointment: Best Day/ Time: Monday AM    Transportation provider: medical cab  Transportation arrangements needed for discharge: No    Discharge Plan: Discharge home with current home care through [employer]. PCP established.          Electronically signed by Jermaine Rowland RN on 18 at 9:01 AM

## 2018-09-26 ENCOUNTER — TELEPHONE (OUTPATIENT)
Dept: FAMILY MEDICINE CLINIC | Age: 68
End: 2018-09-26

## 2018-09-27 ENCOUNTER — HOSPITAL ENCOUNTER (OUTPATIENT)
Dept: WOMENS IMAGING | Age: 68
Discharge: HOME | End: 2018-09-29
Payer: MEDICARE

## 2018-09-27 DIAGNOSIS — Z12.39 BREAST CANCER SCREENING: ICD-10-CM

## 2018-09-27 PROCEDURE — 77067 SCR MAMMO BI INCL CAD: CPT

## 2018-09-30 ENCOUNTER — HOSPITAL ENCOUNTER (EMERGENCY)
Age: 68
Discharge: HOME | End: 2018-09-30
Attending: EMERGENCY MEDICINE
Payer: MEDICARE

## 2018-09-30 ENCOUNTER — APPOINTMENT (OUTPATIENT)
Dept: CT IMAGING | Age: 68
End: 2018-09-30
Payer: MEDICARE

## 2018-09-30 VITALS
SYSTOLIC BLOOD PRESSURE: 114 MMHG | HEART RATE: 62 BPM | BODY MASS INDEX: 43.61 KG/M2 | TEMPERATURE: 98.5 F | OXYGEN SATURATION: 94 % | DIASTOLIC BLOOD PRESSURE: 46 MMHG | HEIGHT: 62 IN | WEIGHT: 237 LBS | RESPIRATION RATE: 16 BRPM

## 2018-09-30 DIAGNOSIS — R19.7 DIARRHEA, UNSPECIFIED TYPE: ICD-10-CM

## 2018-09-30 DIAGNOSIS — R10.30 LOWER ABDOMINAL PAIN: Primary | ICD-10-CM

## 2018-09-30 DIAGNOSIS — R11.0 NAUSEA: ICD-10-CM

## 2018-09-30 LAB
ABSOLUTE EOS #: 0.2 K/UL (ref 0–0.4)
ABSOLUTE IMMATURE GRANULOCYTE: ABNORMAL K/UL (ref 0–0.3)
ABSOLUTE LYMPH #: 2 K/UL (ref 1–4.8)
ABSOLUTE MONO #: 0.7 K/UL (ref 0.1–1.3)
ALBUMIN SERPL-MCNC: 4.1 G/DL (ref 3.5–5.2)
ALBUMIN/GLOBULIN RATIO: ABNORMAL (ref 1–2.5)
ALP BLD-CCNC: 60 U/L (ref 35–104)
ALT SERPL-CCNC: 10 U/L (ref 5–33)
ANION GAP SERPL CALCULATED.3IONS-SCNC: 13 MMOL/L (ref 9–17)
AST SERPL-CCNC: 11 U/L
BASOPHILS # BLD: 1 % (ref 0–2)
BASOPHILS ABSOLUTE: 0.1 K/UL (ref 0–0.2)
BILIRUB SERPL-MCNC: 0.27 MG/DL (ref 0.3–1.2)
BUN BLDV-MCNC: 15 MG/DL (ref 8–23)
BUN/CREAT BLD: ABNORMAL (ref 9–20)
CALCIUM SERPL-MCNC: 9.3 MG/DL (ref 8.6–10.4)
CHLORIDE BLD-SCNC: 101 MMOL/L (ref 98–107)
CO2: 24 MMOL/L (ref 20–31)
CREAT SERPL-MCNC: 1.01 MG/DL (ref 0.5–0.9)
DIFFERENTIAL TYPE: ABNORMAL
EOSINOPHILS RELATIVE PERCENT: 2 % (ref 0–4)
GFR AFRICAN AMERICAN: >60 ML/MIN
GFR NON-AFRICAN AMERICAN: 55 ML/MIN
GFR SERPL CREATININE-BSD FRML MDRD: ABNORMAL ML/MIN/{1.73_M2}
GFR SERPL CREATININE-BSD FRML MDRD: ABNORMAL ML/MIN/{1.73_M2}
GLUCOSE BLD-MCNC: 76 MG/DL (ref 70–99)
HCT VFR BLD CALC: 39.6 % (ref 36–46)
HEMOGLOBIN: 12.8 G/DL (ref 12–16)
IMMATURE GRANULOCYTES: ABNORMAL %
LACTIC ACID: 1.1 MMOL/L (ref 0.5–2.2)
LIPASE: 32 U/L (ref 13–60)
LYMPHOCYTES # BLD: 26 % (ref 24–44)
MCH RBC QN AUTO: 30.3 PG (ref 26–34)
MCHC RBC AUTO-ENTMCNC: 32.4 G/DL (ref 31–37)
MCV RBC AUTO: 93.5 FL (ref 80–100)
MONOCYTES # BLD: 9 % (ref 1–7)
NRBC AUTOMATED: ABNORMAL PER 100 WBC
PDW BLD-RTO: 14.3 % (ref 11.5–14.9)
PLATELET # BLD: 238 K/UL (ref 150–450)
PLATELET ESTIMATE: ABNORMAL
PMV BLD AUTO: 9.3 FL (ref 6–12)
POTASSIUM SERPL-SCNC: 4.3 MMOL/L (ref 3.7–5.3)
RBC # BLD: 4.23 M/UL (ref 4–5.2)
RBC # BLD: ABNORMAL 10*6/UL
SEG NEUTROPHILS: 62 % (ref 36–66)
SEGMENTED NEUTROPHILS ABSOLUTE COUNT: 4.9 K/UL (ref 1.3–9.1)
SODIUM BLD-SCNC: 138 MMOL/L (ref 135–144)
TOTAL PROTEIN: 6.9 G/DL (ref 6.4–8.3)
WBC # BLD: 8 K/UL (ref 3.5–11)
WBC # BLD: ABNORMAL 10*3/UL

## 2018-09-30 PROCEDURE — 36415 COLL VENOUS BLD VENIPUNCTURE: CPT

## 2018-09-30 PROCEDURE — 83605 ASSAY OF LACTIC ACID: CPT

## 2018-09-30 PROCEDURE — 80053 COMPREHEN METABOLIC PANEL: CPT

## 2018-09-30 PROCEDURE — 85025 COMPLETE CBC W/AUTO DIFF WBC: CPT

## 2018-09-30 PROCEDURE — 83690 ASSAY OF LIPASE: CPT

## 2018-09-30 PROCEDURE — 96374 THER/PROPH/DIAG INJ IV PUSH: CPT

## 2018-09-30 PROCEDURE — 74176 CT ABD & PELVIS W/O CONTRAST: CPT

## 2018-09-30 PROCEDURE — 6370000000 HC RX 637 (ALT 250 FOR IP): Performed by: EMERGENCY MEDICINE

## 2018-09-30 PROCEDURE — 2580000003 HC RX 258: Performed by: EMERGENCY MEDICINE

## 2018-09-30 PROCEDURE — 6360000002 HC RX W HCPCS: Performed by: EMERGENCY MEDICINE

## 2018-09-30 PROCEDURE — 99284 EMERGENCY DEPT VISIT MOD MDM: CPT

## 2018-09-30 RX ORDER — DICYCLOMINE HYDROCHLORIDE 10 MG/1
20 CAPSULE ORAL ONCE
Status: COMPLETED | OUTPATIENT
Start: 2018-09-30 | End: 2018-09-30

## 2018-09-30 RX ORDER — ONDANSETRON 4 MG/1
4 TABLET, ORALLY DISINTEGRATING ORAL EVERY 8 HOURS PRN
Qty: 10 TABLET | Refills: 0 | Status: SHIPPED | OUTPATIENT
Start: 2018-09-30 | End: 2018-10-08 | Stop reason: SDUPTHER

## 2018-09-30 RX ORDER — 0.9 % SODIUM CHLORIDE 0.9 %
1000 INTRAVENOUS SOLUTION INTRAVENOUS ONCE
Status: COMPLETED | OUTPATIENT
Start: 2018-09-30 | End: 2018-09-30

## 2018-09-30 RX ORDER — ONDANSETRON 2 MG/ML
4 INJECTION INTRAMUSCULAR; INTRAVENOUS ONCE
Status: COMPLETED | OUTPATIENT
Start: 2018-09-30 | End: 2018-09-30

## 2018-09-30 RX ORDER — DICYCLOMINE HCL 20 MG
20 TABLET ORAL EVERY 6 HOURS
Qty: 15 TABLET | Refills: 0 | Status: SHIPPED | OUTPATIENT
Start: 2018-09-30 | End: 2018-10-08 | Stop reason: SDUPTHER

## 2018-09-30 RX ADMIN — ONDANSETRON 4 MG: 2 INJECTION INTRAMUSCULAR; INTRAVENOUS at 18:04

## 2018-09-30 RX ADMIN — SODIUM CHLORIDE 1000 ML: 9 INJECTION, SOLUTION INTRAVENOUS at 17:46

## 2018-09-30 RX ADMIN — DICYCLOMINE HYDROCHLORIDE 20 MG: 10 CAPSULE ORAL at 18:12

## 2018-09-30 ASSESSMENT — ENCOUNTER SYMPTOMS
DIARRHEA: 1
ABDOMINAL PAIN: 1
SHORTNESS OF BREATH: 0
EYE REDNESS: 0
EYE PAIN: 0
VOMITING: 0
COUGH: 0
NAUSEA: 1
BACK PAIN: 0
BLOOD IN STOOL: 1
RHINORRHEA: 0

## 2018-09-30 ASSESSMENT — PAIN DESCRIPTION - PAIN TYPE: TYPE: ACUTE PAIN

## 2018-09-30 ASSESSMENT — PAIN SCALES - GENERAL: PAINLEVEL_OUTOF10: 8

## 2018-09-30 NOTE — ED PROVIDER NOTES
[address]  eMERGENCY dEPARTMENT eNCOUnter      Pt Name: Phuong Baker  MRN: [id number]  [place] 1950  Date of evaluation: 9/30/2018  PCP:    Joel Wan MD      [facility]       Chief Complaint   Patient presents with    Abdominal Pain     lower     Rectal Bleeding         HISTORY OF PRESENT ILLNESS      Phuong Baker is a 75 y.o. female who presents For evaluation for abdominal pain. Patient states she's had lower abdominal pain since early this morning. Associated with some nausea and more poorly some mild diarrhea that has a little blood in it. This was improved however she still some mild intermittent lower abdominal cramping. Again all started early this morning. States she was fine. Some subjective fevers. No chest pain shortness of breath. Otherwise no other complaints      REVIEW OF SYSTEMS         Review of Systems   Constitutional: Negative for chills and fever. HENT: Negative for congestion and rhinorrhea. Eyes: Negative for pain and redness. Respiratory: Negative for cough and shortness of breath. Cardiovascular: Negative for chest pain and palpitations. Gastrointestinal: Positive for abdominal pain, blood in stool, diarrhea and nausea. Negative for vomiting. Genitourinary: Negative for dysuria, flank pain and urgency. Musculoskeletal: Negative for back pain, myalgias, neck pain and neck stiffness. Skin: Negative for rash. Neurological: Negative for light-headedness and headaches. Hematological: Does not bruise/bleed easily. PAST MEDICAL HISTORY     Past Medical History:   Diagnosis Date    Allergic rhinitis     Anxiety 7/17/2013    Arthritis     Asthma     Atrial fibrillation (HCC)     Back pain     JAIME/DR. GRACIA    CAD (coronary artery disease) 3/21/2013    Caffeine use     2 coffee/day    CHF (congestive heart failure) (HCC)     Chronic kidney disease     COPD (chronic obstructive pulmonary intra-abdominal or intrapelvic pathology. No bowel   obstruction or inflammation. Normal appendix. Punctate nephrolithiasis within the interpolar region of the right kidney. No evidence for urinary obstruction or hydronephrosis. Stable right middle lobe pulmonary nodule. No further follow-up necessary. LABS:  Labs Reviewed   CBC WITH AUTO DIFFERENTIAL - Abnormal; Notable for the following:        Result Value    Monocytes 9 (*)     All other components within normal limits   COMPREHENSIVE METABOLIC PANEL - Abnormal; Notable for the following:     CREATININE 1.01 (*)     Total Bilirubin 0.27 (*)     GFR Non- 55 (*)     All other components within normal limits   LIPASE   LACTIC ACID           EMERGENCY DEPARTMENT COURSE:   Vitals:    Vitals:    09/30/18 1710   BP: (!) 114/46   Pulse: 62   Resp: 16   Temp: 98.5 °F (36.9 °C)   TempSrc: Oral   SpO2: 94%   Weight: 237 lb (107.5 kg)   Height: 5' 2\" (1.575 m)     -------------------------  BP: (!) 114/46, Temp: 98.5 °F (36.9 °C), Pulse: 62, Resp: 16      FINAL IMPRESSION      1. Lower abdominal pain    2. Diarrhea, unspecified type    3.  Nausea          DISPOSITION/PLAN    DISPOSITION Decision To Discharge 09/30/2018 06:06:43 PM      PATIENT REFERRED TO:  Sharona Hdez MD  [address] [facility] [address]  [address] [facility] [phone]    Call in 1 day        DISCHARGE MEDICATIONS:  New Prescriptions    DICYCLOMINE (BENTYL) 20 MG TABLET    Take 1 tablet by mouth every 6 hours    ONDANSETRON (ZOFRAN ODT) 4 MG DISINTEGRATING TABLET    Take 1 tablet by mouth every 8 hours as needed for Nausea       (Please note that portions of this note were completed with a voice recognition program.  Efforts were made to edit the dictations but occasionally words are mis-transcribed.)    Sherrell Aranda MD, PACO, [address] [facility]  Attending Emergency Physician                     Sherrell Aranda MD  09/30/18 3113

## 2018-10-08 ENCOUNTER — OFFICE VISIT (OUTPATIENT)
Dept: FAMILY MEDICINE CLINIC | Age: 68
End: 2018-10-08
Payer: COMMERCIAL

## 2018-10-08 VITALS
OXYGEN SATURATION: 95 % | SYSTOLIC BLOOD PRESSURE: 126 MMHG | WEIGHT: 240.8 LBS | HEIGHT: 63 IN | DIASTOLIC BLOOD PRESSURE: 66 MMHG | HEART RATE: 62 BPM | BODY MASS INDEX: 42.66 KG/M2

## 2018-10-08 DIAGNOSIS — F32.A ANXIETY AND DEPRESSION: ICD-10-CM

## 2018-10-08 DIAGNOSIS — Z23 ENCOUNTER FOR IMMUNIZATION: ICD-10-CM

## 2018-10-08 DIAGNOSIS — Z23 NEED FOR PROPHYLACTIC VACCINATION AGAINST DIPHTHERIA-TETANUS-PERTUSSIS (DTP): ICD-10-CM

## 2018-10-08 DIAGNOSIS — R07.82 INTERCOSTAL PAIN: ICD-10-CM

## 2018-10-08 DIAGNOSIS — F41.9 ANXIETY AND DEPRESSION: ICD-10-CM

## 2018-10-08 DIAGNOSIS — J44.1 COPD EXACERBATION (HCC): Primary | ICD-10-CM

## 2018-10-08 DIAGNOSIS — K52.9 ACUTE GASTROENTERITIS: ICD-10-CM

## 2018-10-08 PROCEDURE — 90662 IIV NO PRSV INCREASED AG IM: CPT | Performed by: FAMILY MEDICINE

## 2018-10-08 PROCEDURE — 99495 TRANSJ CARE MGMT MOD F2F 14D: CPT | Performed by: FAMILY MEDICINE

## 2018-10-08 PROCEDURE — 1111F DSCHRG MED/CURRENT MED MERGE: CPT | Performed by: FAMILY MEDICINE

## 2018-10-08 PROCEDURE — G0008 ADMIN INFLUENZA VIRUS VAC: HCPCS | Performed by: FAMILY MEDICINE

## 2018-10-08 RX ORDER — ONDANSETRON 4 MG/1
4 TABLET, ORALLY DISINTEGRATING ORAL EVERY 8 HOURS PRN
Qty: 10 TABLET | Refills: 0 | Status: SHIPPED | OUTPATIENT
Start: 2018-10-08 | End: 2018-12-11 | Stop reason: ALTCHOICE

## 2018-10-08 RX ORDER — CITALOPRAM 40 MG/1
TABLET ORAL
Qty: 90 TABLET | Refills: 3 | Status: SHIPPED | OUTPATIENT
Start: 2018-10-08 | End: 2019-04-02

## 2018-10-08 RX ORDER — DICYCLOMINE HCL 20 MG
20 TABLET ORAL EVERY 6 HOURS
Qty: 15 TABLET | Refills: 0 | Status: SHIPPED | OUTPATIENT
Start: 2018-10-08 | End: 2019-05-16

## 2018-10-08 ASSESSMENT — ENCOUNTER SYMPTOMS
EYE REDNESS: 0
TROUBLE SWALLOWING: 0
WHEEZING: 1
BACK PAIN: 1
ABDOMINAL PAIN: 0
SHORTNESS OF BREATH: 1
BLOOD IN STOOL: 0
SINUS PRESSURE: 0
CONSTIPATION: 0
DIARRHEA: 0
COUGH: 1
CHEST TIGHTNESS: 0

## 2018-10-08 NOTE — PROGRESS NOTES
Recurrent UTI    Morbid obesity with BMI of 40.0-44.9, adult (HCC)    Chest pain    Congestive heart failure (HCC)    Permanent atrial fibrillation (HCC)       Allergies   Allergen Reactions    Robitussin [Guaifenesin] Anaphylaxis    Codeine Swelling    Compazine [Prochlorperazine Maleate] Hives and Swelling    Iodides Itching    Morphine Other (See Comments)     hallucinations    Moxifloxacin      Other reaction(s): Intolerance-unknown  Other reaction(s):  Intolerance-unknown    Reglan [Metoclopramide] Nausea Only    Avelox [Moxifloxacin Hcl In Nacl] Rash     Dermatitis      Cipro Xr Rash     dermatitis    Sulfa Antibiotics Rash       Medications listed as ordered at the time of discharge from hospital   Dylon Jules   [place] Medication Instructions GABO:    Printed on:10/08/18 7714   Medication Information                      amLODIPine (NORVASC) 5 MG tablet  TAKE 1 TAB BY MOUTH ONCE A DAY             Blood Glucose Monitoring Suppl HARMEET  Use daily to check BS             BREO ELLIPTA 200-25 MCG/INH AEPB               Calcium Carb-Cholecalciferol (OYSCO D) 250-125 MG-UNIT TABS  Take 1 tablet by mouth daily             carvedilol (COREG) 3.125 MG tablet  TAKE 1 TAB BY MOUTH TWICE A DAY             citalopram (CELEXA) 40 MG tablet  TAKE 1 TAB BY MOUTH ONCE A DAY             clopidogrel (PLAVIX) 75 MG tablet  TAKE 1 TAB BY MOUTH ONCE A DAY             Compression Stockings MISC  by Does not apply route Pressure between 20 - 25 .             dicyclomine (BENTYL) 20 MG tablet  Take 1 tablet by mouth every 6 hours             docusate sodium (COLACE) 100 MG capsule  Take 1 capsule by mouth 2 times daily Please use while taking Percocet             ferrous sulfate 325 (65 FE) MG tablet  Take 325 mg by mouth daily (with breakfast)             furosemide (LASIX) 20 MG tablet  TAKE 1 TABLET BY MOUTH AS NEEDED (LEG SWELLING)             gabapentin (NEURONTIN) 300 MG capsule  Take 1 capsule by mouth 2 times tiotropium (SPIRIVA RESPIMAT) 1.25 MCG/ACT AERS inhaler  Inhale 2 puffs into the lungs daily             tiZANidine (ZANAFLEX) 2 MG tablet  Take 1 tablet by mouth every 12 hours as needed (back pain) Causes sedation, do not drive while taking this medication. Short term. topiramate (TOPAMAX) 100 MG tablet  Take 1 tablet by mouth nightly             ULTICARE SHORT PEN NEEDLES 31G X 8 MM MISC  AS DIRECTED             vitamin B-12 (CYANOCOBALAMIN) 1000 MCG tablet  Take 1,000 mcg by mouth daily             zoster recombinant adjuvanted vaccine (SHINGRIX) 50 MCG SUSR injection  50 MCG IM then repeat 2-6 months. Medications marked \"taking\" at this time  Outpatient Prescriptions Marked as Taking for the 10/8/18 encounter (Office Visit) with Seth Montana MD   Medication Sig Dispense Refill    citalopram (CELEXA) 40 MG tablet TAKE 1 TAB BY MOUTH ONCE A DAY 90 tablet 3    dicyclomine (BENTYL) 20 MG tablet Take 1 tablet by mouth every 6 hours 15 tablet 0    ondansetron (ZOFRAN ODT) 4 MG disintegrating tablet Take 1 tablet by mouth every 8 hours as needed for Nausea 10 tablet 0    Tdap (ADACEL) 5-2-15.5 LF-MCG/0.5 injection Inject 0.5 mLs into the muscle once for 1 dose 0.5 mL 0    tiotropium (SPIRIVA RESPIMAT) 1.25 MCG/ACT AERS inhaler Inhale 2 puffs into the lungs daily 1 Inhaler 3    vitamin B-12 (CYANOCOBALAMIN) 1000 MCG tablet Take 1,000 mcg by mouth daily      carvedilol (COREG) 3.125 MG tablet TAKE 1 TAB BY MOUTH TWICE A DAY 60 tablet 3    oxyCODONE-acetaminophen (PERCOCET) 5-325 MG per tablet Take 1 tablet by mouth See Admin Instructions for 30 days. 1 tab every 6-8 hours prn. 100 tablet 0    nitroGLYCERIN (NITROSTAT) 0.4 MG SL tablet 1 under the tongue as needed for angina, may repeat q5mins for up three doses      zoster recombinant adjuvanted vaccine (SHINGRIX) 50 MCG SUSR injection 50 MCG IM then repeat 2-6 months.  0.5 mL 1    tiZANidine (ZANAFLEX) 2 MG tablet Take 1 work done, she was given Zofran and Bentyl, reports her symptoms has improved diarrhea resolved no blood since then. Diabetes controlled A1c is mildly increased likely due to steroids. Patient wants to get electric wheelchair, reports she is unable to walk is short of breath more easily. Inpatient course: Discharge summary reviewed- see chart. Interval history/Current status:  Stable,     Review of Systems   Constitutional: Negative for activity change, diaphoresis, fatigue and unexpected weight change. HENT: Negative. Negative for congestion, postnasal drip, sinus pressure, sneezing and trouble swallowing. Eyes: Negative for redness and visual disturbance. Respiratory: Positive for cough, shortness of breath and wheezing. Negative for chest tightness. Cardiovascular: Positive for chest pain. Negative for leg swelling. Gastrointestinal: Negative for abdominal pain, blood in stool, constipation and diarrhea. Endocrine: Negative for polydipsia, polyphagia and polyuria. Genitourinary: Negative for decreased urine volume, dysuria, frequency, urgency and vaginal pain. Musculoskeletal: Positive for arthralgias, back pain, gait problem and joint swelling. Skin: Negative for pallor. Allergic/Immunologic: Negative for food allergies and immunocompromised state. Neurological: Positive for numbness. Negative for dizziness, weakness, light-headedness and headaches. Psychiatric/Behavioral: Negative for agitation, decreased concentration, dysphoric mood and sleep disturbance. The patient is not nervous/anxious and is not hyperactive. Vitals:    10/08/18 1107   BP: 126/66   Pulse: 62   SpO2: 95%   Weight: 240 lb 12.8 oz (109.2 kg)   Height: 5' 3\" (1.6 m)     Body mass index is 42.66 kg/m².    Wt Readings from Last 3 Encounters:   10/08/18 240 lb 12.8 oz (109.2 kg)   09/30/18 237 lb (107.5 kg)   09/24/18 237 lb (107.5 kg)     BP Readings from Last 3 Encounters:   10/08/18 126/66   09/30/18

## 2018-10-09 ENCOUNTER — HOSPITAL ENCOUNTER (OUTPATIENT)
Dept: PAIN MANAGEMENT | Age: 68
Discharge: HOME | End: 2018-10-09
Payer: COMMERCIAL

## 2018-10-09 VITALS
SYSTOLIC BLOOD PRESSURE: 121 MMHG | BODY MASS INDEX: 42.52 KG/M2 | RESPIRATION RATE: 20 BRPM | WEIGHT: 240 LBS | HEART RATE: 72 BPM | DIASTOLIC BLOOD PRESSURE: 58 MMHG | TEMPERATURE: 98.1 F | OXYGEN SATURATION: 94 % | HEIGHT: 63 IN

## 2018-10-09 DIAGNOSIS — E66.01 OBESITY, MORBID, BMI 40.0-49.9 (HCC): ICD-10-CM

## 2018-10-09 DIAGNOSIS — F11.90 CHRONIC, CONTINUOUS USE OF OPIOIDS: ICD-10-CM

## 2018-10-09 DIAGNOSIS — M47.892 OTHER OSTEOARTHRITIS OF SPINE, CERVICAL REGION: ICD-10-CM

## 2018-10-09 DIAGNOSIS — M50.30 DEGENERATIVE CERVICAL DISC: ICD-10-CM

## 2018-10-09 DIAGNOSIS — M51.36 DDD (DEGENERATIVE DISC DISEASE), LUMBAR: ICD-10-CM

## 2018-10-09 DIAGNOSIS — M50.30 DDD (DEGENERATIVE DISC DISEASE), CERVICAL: ICD-10-CM

## 2018-10-09 DIAGNOSIS — Z51.81 MEDICATION MONITORING ENCOUNTER: ICD-10-CM

## 2018-10-09 DIAGNOSIS — M47.817 LUMBOSACRAL SPONDYLOSIS WITHOUT MYELOPATHY: Primary | ICD-10-CM

## 2018-10-09 DIAGNOSIS — M51.37 DEGENERATION OF LUMBAR OR LUMBOSACRAL INTERVERTEBRAL DISC: ICD-10-CM

## 2018-10-09 DIAGNOSIS — M54.81 BILATERAL OCCIPITAL NEURALGIA: ICD-10-CM

## 2018-10-09 DIAGNOSIS — M54.16 LUMBAR RADICULOPATHY, CHRONIC: ICD-10-CM

## 2018-10-09 DIAGNOSIS — Z51.81 ENCOUNTER FOR MEDICATION MONITORING: ICD-10-CM

## 2018-10-09 DIAGNOSIS — M25.562 CHRONIC PAIN OF LEFT KNEE: ICD-10-CM

## 2018-10-09 DIAGNOSIS — G89.29 CHRONIC PAIN OF LEFT KNEE: ICD-10-CM

## 2018-10-09 DIAGNOSIS — G62.9 NEUROPATHY: ICD-10-CM

## 2018-10-09 DIAGNOSIS — M46.1 SACROILIITIS, NOT ELSEWHERE CLASSIFIED (HCC): ICD-10-CM

## 2018-10-09 DIAGNOSIS — G89.29 ENCOUNTER FOR CHRONIC PAIN MANAGEMENT: ICD-10-CM

## 2018-10-09 DIAGNOSIS — M47.812 OSTEOARTHRITIS OF CERVICAL SPINE, UNSPECIFIED SPINAL OSTEOARTHRITIS COMPLICATION STATUS: ICD-10-CM

## 2018-10-09 PROCEDURE — 99213 OFFICE O/P EST LOW 20 MIN: CPT

## 2018-10-09 PROCEDURE — 99213 OFFICE O/P EST LOW 20 MIN: CPT | Performed by: NURSE PRACTITIONER

## 2018-10-09 RX ORDER — OXYCODONE HYDROCHLORIDE AND ACETAMINOPHEN 5; 325 MG/1; MG/1
1 TABLET ORAL SEE ADMIN INSTRUCTIONS
Qty: 100 TABLET | Refills: 0 | Status: SHIPPED | OUTPATIENT
Start: 2018-10-13 | End: 2018-11-08 | Stop reason: SDUPTHER

## 2018-10-09 ASSESSMENT — ENCOUNTER SYMPTOMS
BACK PAIN: 1
NAUSEA: 1

## 2018-10-09 NOTE — PROGRESS NOTES
Patient denies any side effects and reports adequate analgesia. No sign of misuse/abuse. When was the last UDS:  6-8-18          Was the UDS appropriate:yes      Record/Diagnostics Review:      As above, I did review the imaging    Record/Diagnostics Review:       As above, I did review the imaging     6/13/2018  3:38 PM - Carm[place], [facility] Incoming Lab Results From [facility]      Component Results      Component Value Ref Range & Units Status Collected Lab   Pain Management Drug Panel Interp, Urine Consistent    Final 06/08/2018  9:45 AM [facility]   (NOTE)   ________________________________________________________________   DRUGS EXPECTED:   OXYCODONE [6/3/18]   ________________________________________________________________   CONSISTENT with medications provided:   OXYCODONE : based on the absence of oxycodone and metabolites   ________________________________________________________________   INTERPRETIVE INFORMATION: Pain Mgt [place], [place] Spec/EMIT, Ur,                            Interp   Interpretation depends on accuracy and completeness of patient   medication information submitted by client. 6-Acetylmorphine, Ur Not Detected    Final 06/08/2018  9:45 AM [facility]   7-Aminoclonazepam, Urine Not Detected    Final 06/08/2018  9:45 AM [facility]   Alpha-OH-Alpraz, Urine Not Detected    Final 06/08/2018  9:45 AM [facility]   Alprazolam, Urine Not Detected    Final 06/08/2018  9:45 AM [facility]   Amphetamines, urine Not Detected    Final 06/08/2018  9:45 AM [facility]   Barbiturates, Ur Not Detected    Final 06/08/2018  9:45 AM [facility]   Benzoylecgonine, Ur Not Detected    Final 06/08/2018  9:45 AM [facility]   Buprenorphine Urine Not Detected    Final 06/08/2018  9:45 AM [facility]   Carisoprodol, Ur Not Detected    Final 06/08/2018  9:45 AM [facility]   (NOTE)   The carisoprodol immunoassay has cross-reactivity to carisoprodol   and meprobamate.     Clonazepam, Urine Not Detected    Final 06/08/2018  9:45 AM [facility]   Codeine, Urine Not Detected    Final 06/08/2018  9:45 AM [facility]   MDA, Ur Not Detected    Final 06/08/2018  9:45 AM [facility]   Diazepam, Urine Not Detected    Final 06/08/2018  9:45 AM [facility]   Ethyl Glucuronide Ur Not Detected    Final 06/08/2018  9:45 AM [facility]   Fentanyl, Ur Not Detected    Final 06/08/2018  9:45 AM [facility]   Hydrocodone, Urine Not Detected    Final 06/08/2018  9:45 AM [facility]   Hydromorphone, Urine Not Detected    Final 06/08/2018  9:45 AM [facility]   Lorazepam, Urine Not Detected    Final 06/08/2018  9:45 AM [facility]   Marijuana Metab, Ur Not Detected    Final 06/08/2018  9:45 AM [facility]   MDEA, ALMA, Ur Not Detected    Final 06/08/2018  9:45 AM [facility]   MDMA, Urine Not Detected    Final 06/08/2018  9:45 AM [facility]   Meperidine Metab, Ur Not Detected    Final 06/08/2018  9:45 AM [facility]   Methadone, Urine Not Detected    Final 06/08/2018  9:45 AM [facility]   Methamphetamine, Urine Not Detected    Final 06/08/2018  9:45 AM [facility]   Methylphenidate Not Detected    Final 06/08/2018  9:45 AM [facility]   Midazolam, Urine Not Detected    Final 06/08/2018  9:45 AM [facility]   Morphine Urine Not Detected    Final 06/08/2018  9:45 AM [facility]   Norbuprenorphine, Urine Not Detected    Final 06/08/2018  9:45 AM [facility]   Nordiazepam, Urine Not Detected    Final 06/08/2018  9:45 AM [facility]   Norfentanyl, Urine Not Detected    Final 06/08/2018  9:45 AM [facility]   NORHYDROCODONE, URINE Not Detected    Final 06/08/2018  9:45 AM [facility]   Noroxycodone, Urine Not Detected    Final 06/08/2018  9:45 AM [facility]   NOROXYMORPHONE, URINE Not Detected    Final 06/08/2018  9:45 AM [facility]   Oxazepam, Urine Not Detected    Final 06/08/2018  9:45 AM [facility]   Oxycodone Urine Not Detected    Final 06/08/2018  9:45 AM [facility]   Oxymorphone, Urine Not Detected    Final 06/08/2018  9:45 AM [facility]   PCP, Urine Not Detected    Final 06/08/2018  9:45 AM [facility]   Phentermine, Ur Not Detected    Final 06/08/2018  9:45 AM [facility]   Propoxyphene, Urine Not Detected    Final 06/08/2018  9:45 AM [facility]   Tapentadol-O-Sulfate, Urine Not Detected    Final 06/08/2018  9:45

## 2018-10-15 ENCOUNTER — TELEPHONE (OUTPATIENT)
Dept: FAMILY MEDICINE CLINIC | Age: 68
End: 2018-10-15

## 2018-10-26 ENCOUNTER — TELEPHONE (OUTPATIENT)
Dept: FAMILY MEDICINE CLINIC | Age: 68
End: 2018-10-26

## 2018-10-26 DIAGNOSIS — I50.9 CHRONIC CONGESTIVE HEART FAILURE, UNSPECIFIED HEART FAILURE TYPE (HCC): Primary | ICD-10-CM

## 2018-10-30 ENCOUNTER — OFFICE VISIT (OUTPATIENT)
Dept: GASTROENTEROLOGY | Age: 68
End: 2018-10-30
Payer: COMMERCIAL

## 2018-10-30 VITALS
SYSTOLIC BLOOD PRESSURE: 129 MMHG | WEIGHT: 246 LBS | HEART RATE: 75 BPM | DIASTOLIC BLOOD PRESSURE: 70 MMHG | BODY MASS INDEX: 43.58 KG/M2

## 2018-10-30 DIAGNOSIS — E66.9 OBESITY (BMI 30-39.9): ICD-10-CM

## 2018-10-30 DIAGNOSIS — K58.0 IRRITABLE BOWEL SYNDROME WITH DIARRHEA: ICD-10-CM

## 2018-10-30 DIAGNOSIS — K21.9 GASTROESOPHAGEAL REFLUX DISEASE WITHOUT ESOPHAGITIS: Primary | Chronic | ICD-10-CM

## 2018-10-30 PROCEDURE — 1101F PT FALLS ASSESS-DOCD LE1/YR: CPT | Performed by: INTERNAL MEDICINE

## 2018-10-30 PROCEDURE — G8598 ASA/ANTIPLAT THER USED: HCPCS | Performed by: INTERNAL MEDICINE

## 2018-10-30 PROCEDURE — G8427 DOCREV CUR MEDS BY ELIG CLIN: HCPCS | Performed by: INTERNAL MEDICINE

## 2018-10-30 PROCEDURE — G8417 CALC BMI ABV UP PARAM F/U: HCPCS | Performed by: INTERNAL MEDICINE

## 2018-10-30 PROCEDURE — G8399 PT W/DXA RESULTS DOCUMENT: HCPCS | Performed by: INTERNAL MEDICINE

## 2018-10-30 PROCEDURE — 3017F COLORECTAL CA SCREEN DOC REV: CPT | Performed by: INTERNAL MEDICINE

## 2018-10-30 PROCEDURE — 4040F PNEUMOC VAC/ADMIN/RCVD: CPT | Performed by: INTERNAL MEDICINE

## 2018-10-30 PROCEDURE — 1036F TOBACCO NON-USER: CPT | Performed by: INTERNAL MEDICINE

## 2018-10-30 PROCEDURE — 1123F ACP DISCUSS/DSCN MKR DOCD: CPT | Performed by: INTERNAL MEDICINE

## 2018-10-30 PROCEDURE — G8482 FLU IMMUNIZE ORDER/ADMIN: HCPCS | Performed by: INTERNAL MEDICINE

## 2018-10-30 PROCEDURE — 1090F PRES/ABSN URINE INCON ASSESS: CPT | Performed by: INTERNAL MEDICINE

## 2018-10-30 PROCEDURE — 99214 OFFICE O/P EST MOD 30 MIN: CPT | Performed by: INTERNAL MEDICINE

## 2018-10-30 ASSESSMENT — ENCOUNTER SYMPTOMS
COUGH: 0
SHORTNESS OF BREATH: 1
NAUSEA: 1
WHEEZING: 0
CHOKING: 0
CONSTIPATION: 1
RECTAL PAIN: 0
SINUS PRESSURE: 0
ALLERGIC/IMMUNOLOGIC NEGATIVE: 1
BLOOD IN STOOL: 0
VOICE CHANGE: 0
SORE THROAT: 0
TROUBLE SWALLOWING: 1
BACK PAIN: 1
ABDOMINAL PAIN: 1
VOMITING: 1
ANAL BLEEDING: 0
DIARRHEA: 1
ABDOMINAL DISTENTION: 1

## 2018-10-30 NOTE — PROGRESS NOTES
Subjective:      Patient ID: Doug Delgado is a 75 y.o. female. Dr. Shireen Sahu MD has requested that I see Doug Delgado for a consult for   1. Gastroesophageal reflux disease without esophagitis    2. Obesity (BMI 30-39.9)    3. Irritable bowel syndrome with diarrhea     . She is seen in my office   She was seen in the hospital last year  She had EGD and Colonoscopy in the hospital  She had T adenoma  Had sigmoid diverticulosis  She had some recent worsening of GERD and has been on PPI now and is feeling better  Patient has been complaining of some abdominal pains, off and on cramping  Also complains of abdominal bloating and gas  Has off and on nausea without any sig vomiting  Has some alternating constipation and diarrhea  Has no weight loss  Has some anxiety issues    Past Medical, Family, and Social History reviewed and does contribute to the patient presenting condition. patient\"s PMH/PSH,SH,PSYCH hx, MEDs, ALLERGIES, and ROS was all reviewed and updated ion the appropriate sections    Gastroesophageal Reflux   She complains of abdominal pain and nausea. She reports no choking, no coughing, no sore throat or no wheezing. Diarrhea    Associated symptoms include abdominal pain and vomiting. Pertinent negatives include no arthralgias, coughing, headaches or myalgias. Review of Systems   Constitutional: Negative. HENT: Positive for dental problem and trouble swallowing. Negative for postnasal drip, sinus pressure, sore throat and voice change. Eyes: Positive for visual disturbance. Respiratory: Positive for shortness of breath. Negative for cough, choking and wheezing. Gastrointestinal: Positive for abdominal distention, abdominal pain, constipation, diarrhea, nausea and vomiting. Negative for anal bleeding, blood in stool and rectal pain. Endocrine: Negative. Genitourinary: Negative. Musculoskeletal: Positive for back pain and gait problem.  Negative for

## 2018-11-02 LAB
BUN BLDV-MCNC: 23 MG/DL
CALCIUM SERPL-MCNC: 9.5 MG/DL
CHLORIDE BLD-SCNC: 99 MMOL/L
CO2: 27 MMOL/L
CREAT SERPL-MCNC: 1 MG/DL
GFR CALCULATED: 67
GLUCOSE BLD-MCNC: 137 MG/DL
POTASSIUM SERPL-SCNC: 4.5 MMOL/L
SODIUM BLD-SCNC: 140 MMOL/L

## 2018-11-05 DIAGNOSIS — I50.9 CHRONIC CONGESTIVE HEART FAILURE, UNSPECIFIED HEART FAILURE TYPE (HCC): ICD-10-CM

## 2018-11-06 ENCOUNTER — TELEPHONE (OUTPATIENT)
Dept: FAMILY MEDICINE CLINIC | Age: 68
End: 2018-11-06

## 2018-11-06 ENCOUNTER — OFFICE VISIT (OUTPATIENT)
Dept: FAMILY MEDICINE CLINIC | Age: 68
End: 2018-11-06
Payer: COMMERCIAL

## 2018-11-06 VITALS
OXYGEN SATURATION: 93 % | SYSTOLIC BLOOD PRESSURE: 131 MMHG | WEIGHT: 248.8 LBS | HEIGHT: 62 IN | HEART RATE: 72 BPM | BODY MASS INDEX: 45.78 KG/M2 | DIASTOLIC BLOOD PRESSURE: 72 MMHG

## 2018-11-06 DIAGNOSIS — E66.01 MORBID OBESITY WITH BMI OF 40.0-44.9, ADULT (HCC): ICD-10-CM

## 2018-11-06 DIAGNOSIS — M47.817 LUMBOSACRAL SPONDYLOSIS WITHOUT MYELOPATHY: Chronic | ICD-10-CM

## 2018-11-06 DIAGNOSIS — I10 ESSENTIAL HYPERTENSION: Primary | ICD-10-CM

## 2018-11-06 DIAGNOSIS — M51.36 DDD (DEGENERATIVE DISC DISEASE), LUMBAR: ICD-10-CM

## 2018-11-06 DIAGNOSIS — M47.892 OTHER OSTEOARTHRITIS OF SPINE, CERVICAL REGION: Chronic | ICD-10-CM

## 2018-11-06 DIAGNOSIS — J44.9 ASTHMA WITH COPD (HCC): ICD-10-CM

## 2018-11-06 DIAGNOSIS — K21.00 GASTROESOPHAGEAL REFLUX DISEASE WITH ESOPHAGITIS: ICD-10-CM

## 2018-11-06 DIAGNOSIS — M50.30 DDD (DEGENERATIVE DISC DISEASE), CERVICAL: Primary | Chronic | ICD-10-CM

## 2018-11-06 DIAGNOSIS — G47.33 OBSTRUCTIVE SLEEP APNEA SYNDROME: Chronic | ICD-10-CM

## 2018-11-06 PROCEDURE — G8427 DOCREV CUR MEDS BY ELIG CLIN: HCPCS | Performed by: FAMILY MEDICINE

## 2018-11-06 PROCEDURE — G8598 ASA/ANTIPLAT THER USED: HCPCS | Performed by: FAMILY MEDICINE

## 2018-11-06 PROCEDURE — G8417 CALC BMI ABV UP PARAM F/U: HCPCS | Performed by: FAMILY MEDICINE

## 2018-11-06 PROCEDURE — 1090F PRES/ABSN URINE INCON ASSESS: CPT | Performed by: FAMILY MEDICINE

## 2018-11-06 PROCEDURE — 1036F TOBACCO NON-USER: CPT | Performed by: FAMILY MEDICINE

## 2018-11-06 PROCEDURE — 3017F COLORECTAL CA SCREEN DOC REV: CPT | Performed by: FAMILY MEDICINE

## 2018-11-06 PROCEDURE — 1101F PT FALLS ASSESS-DOCD LE1/YR: CPT | Performed by: FAMILY MEDICINE

## 2018-11-06 PROCEDURE — G8399 PT W/DXA RESULTS DOCUMENT: HCPCS | Performed by: FAMILY MEDICINE

## 2018-11-06 PROCEDURE — 4040F PNEUMOC VAC/ADMIN/RCVD: CPT | Performed by: FAMILY MEDICINE

## 2018-11-06 PROCEDURE — G8926 SPIRO NO PERF OR DOC: HCPCS | Performed by: FAMILY MEDICINE

## 2018-11-06 PROCEDURE — 1123F ACP DISCUSS/DSCN MKR DOCD: CPT | Performed by: FAMILY MEDICINE

## 2018-11-06 PROCEDURE — 3023F SPIROM DOC REV: CPT | Performed by: FAMILY MEDICINE

## 2018-11-06 PROCEDURE — 99214 OFFICE O/P EST MOD 30 MIN: CPT | Performed by: FAMILY MEDICINE

## 2018-11-06 PROCEDURE — G8482 FLU IMMUNIZE ORDER/ADMIN: HCPCS | Performed by: FAMILY MEDICINE

## 2018-11-06 RX ORDER — PANTOPRAZOLE SODIUM 40 MG/1
40 TABLET, DELAYED RELEASE ORAL DAILY
Qty: 60 TABLET | Refills: 3 | Status: SHIPPED | OUTPATIENT
Start: 2018-11-06 | End: 2018-12-05 | Stop reason: SDUPTHER

## 2018-11-06 RX ORDER — LOSARTAN POTASSIUM 25 MG/1
TABLET ORAL
Qty: 90 TABLET | Refills: 5 | Status: SHIPPED | OUTPATIENT
Start: 2018-11-06 | End: 2021-01-15

## 2018-11-06 ASSESSMENT — ENCOUNTER SYMPTOMS
SINUS PRESSURE: 0
ABDOMINAL DISTENTION: 0
NAUSEA: 1
DIARRHEA: 0
COLOR CHANGE: 0
RECTAL PAIN: 0
ABDOMINAL PAIN: 1
PHOTOPHOBIA: 0
COUGH: 0
EYE REDNESS: 0
SHORTNESS OF BREATH: 0

## 2018-11-06 NOTE — PROGRESS NOTES
Chief Complaint   Patient presents with    Other     lift chair exam    Other     always hungry         Jacinda Reno  here today for follow up on chronic medical problems, go over labs and/or diagnostic studies, and medication refills. Other (lift chair exam) and Other (always hungry)      HPIL:Patient is here for evaluation for lift wheelchair. Patient reports she has difficulty in moving at home from one room to another and also difficulty in doing her daily activities. She has lumbar radiculopathy follows with pain management, who recommended left ear. Diabetes controlled, sugars are running as her 150. Patient reports she is hungry most of the time she eats fruits and salads      Patient was taking 80 mg of pantoprazole discussed with patient that stool high dose and take only 1 tablet daily and continue Zantac. Jacinda Reno was evaluated today and a order for lift chair because she requires this to successfully complete daily living tasks of eating, toileting, personal cares and ambulating. A lift chair  is necessary due to the patient's unsteady gait, , inability to  and ambulation device, ambulating only short distances by pushing a walker, and the need to sit for a short time before resuming ambulation. These tasks cannot be completed with a lesser ambulation device such as a cane, crutch, or standard walker. The need for this equipment was discussed with the patient and she understands and is in agreement. /72 (Site: Right Upper Arm, Position: Sitting)   Pulse 72   Ht 5' 2\" (1.575 m)   Wt 248 lb 12.8 oz (112.9 kg)   LMP  (LMP Unknown)   SpO2 93%   BMI 45.51 kg/m²   Body mass index is 45.51 kg/m². Wt Readings from Last 3 Encounters:   11/06/18 248 lb 12.8 oz (112.9 kg)   10/30/18 246 lb (111.6 kg)   10/09/18 240 lb (108.9 kg)        []Negative depression screening.   PHQ Scores 2/14/2018 7/15/2015   PHQ2 Score 0 0   PHQ9 Score 0 0      []1-4 = Minimal 2 times daily (with meals) for 4 days 8 tablet 0    gabapentin (NEURONTIN) 300 MG capsule Take 1 capsule by mouth 2 times daily for 30 days. . 60 capsule 0     No current facility-administered medications for this visit. Social History     Social History    Marital status: Single     Spouse name: N/A    Number of children: N/A    Years of education: N/A     Occupational History    disabled N/A     Social History Main Topics    Smoking status: Former Smoker     Packs/day: 3.00     Years: 41.00     Types: Cigarettes     Quit date: 1/1/2000    Smokeless tobacco: Never Used      Comment: quit in 2000    Alcohol use No    Drug use: No    Sexual activity: Not Currently     Other Topics Concern    Not on file     Social History Narrative    No narrative on file     Counseling given: Not Answered        Family History   Problem Relation Age of Onset    Diabetes Mother     Heart Disease Mother     Stomach Cancer Father     Diabetes Maternal Grandmother         also aunts and uncles (maternal)    Emphysema Sister              -rest of complaints with corresponding details per ROS    The patient's past medical, surgical, social, and family history as well as her current medications and allergies were reviewed as documented intoday's encounter. Review of Systems   Constitutional: Positive for activity change. Negative for appetite change, fatigue, fever and unexpected weight change. HENT: Negative for congestion, nosebleeds, postnasal drip and sinus pressure. Eyes: Negative for photophobia, redness and visual disturbance. Respiratory: Negative for cough and shortness of breath. Cardiovascular: Negative for chest pain and palpitations. Gastrointestinal: Positive for abdominal pain and nausea. Negative for abdominal distention, diarrhea and rectal pain. Genitourinary: Negative for difficulty urinating, frequency, urgency and vaginal pain.    Musculoskeletal: Negative for arthralgias, neck pain and neck stiffness. Skin: Negative for color change. Neurological: Positive for weakness and numbness. Negative for dizziness, light-headedness and headaches. Psychiatric/Behavioral: Negative for agitation, decreased concentration and sleep disturbance. The patient is not nervous/anxious. Physical Exam    PHYSICAL EXAM:   VITALS:   Vitals:    11/06/18 1017   BP: 131/72   Pulse: 72   SpO2: 93%     GENERAL:  Patient is a well-developed, well-nourished female  in no acute distress, alert and oriented x3, appropriate and pleasant conversation. HEAD: Normocephalic, atraumatic. EYES: Pupils equal, round and reactive to light and accommodation, extraocular   movements intact. ENT: Moist mucous membranes. No erythema is noted. NECK: Supple. No masses. No lymphadenopathy. CARDIOVASCULAR: Regular rate and rhythm. PULMONARY: Lungs are clear to auscultation bilaterally. ABDOMEN: Soft, nontender, nondistended. Positive bowel sounds. MUSCULOSKELETAL: Chronic lower lumbar spine tenderness, decreased strength, decreased sensations in lower extremities. Decreased strength in upper extremities. Garlon Odor NEUROLOGIC: Cranial nerves II through XII grossly intact. No focal deficits are noted. ASSESSMENT AND PLAN      1. Gastroesophageal reflux disease with esophagitis  -Discussed take only 1 tablet daily. Continue to follow with GI  - pantoprazole (PROTONIX) 40 MG tablet; Take 1 tablet by mouth daily  Dispense: 60 tablet; Refill: 3    2. Essential hypertension  -Controlled continue same medications  - losartan (COZAAR) 25 MG tablet; TAKE 1 TAB BY MOUTH ONCE A DAY  Dispense: 90 tablet; Refill: 5    3. Asthma with COPD ([facility]  -Stable    4. Morbid obesity with BMI of 40.0-44.9, adult ([facility]  -Lift chair ordered  - Lift Chair MISC; by Does not apply route Use daily at home to help with ADL's  Dispense: 1 each; Refill: 0    5.  Lumbosacral spondylosis without myelopathy  -Patient may

## 2018-11-08 ENCOUNTER — HOSPITAL ENCOUNTER (OUTPATIENT)
Dept: PAIN MANAGEMENT | Age: 68
Discharge: HOME | End: 2018-11-08
Payer: COMMERCIAL

## 2018-11-08 VITALS
HEART RATE: 65 BPM | DIASTOLIC BLOOD PRESSURE: 55 MMHG | OXYGEN SATURATION: 94 % | SYSTOLIC BLOOD PRESSURE: 117 MMHG | WEIGHT: 245 LBS | TEMPERATURE: 97.8 F | RESPIRATION RATE: 16 BRPM | HEIGHT: 62 IN | BODY MASS INDEX: 45.08 KG/M2

## 2018-11-08 DIAGNOSIS — Z51.81 ENCOUNTER FOR MEDICATION MONITORING: ICD-10-CM

## 2018-11-08 DIAGNOSIS — M54.81 BILATERAL OCCIPITAL NEURALGIA: ICD-10-CM

## 2018-11-08 DIAGNOSIS — M46.1 SACROILIITIS, NOT ELSEWHERE CLASSIFIED (HCC): ICD-10-CM

## 2018-11-08 DIAGNOSIS — E66.01 OBESITY, MORBID, BMI 40.0-49.9 (HCC): ICD-10-CM

## 2018-11-08 DIAGNOSIS — M47.812 OSTEOARTHRITIS OF CERVICAL SPINE, UNSPECIFIED SPINAL OSTEOARTHRITIS COMPLICATION STATUS: ICD-10-CM

## 2018-11-08 DIAGNOSIS — M47.892 OTHER OSTEOARTHRITIS OF SPINE, CERVICAL REGION: ICD-10-CM

## 2018-11-08 DIAGNOSIS — M51.37 DEGENERATION OF LUMBAR OR LUMBOSACRAL INTERVERTEBRAL DISC: ICD-10-CM

## 2018-11-08 DIAGNOSIS — Z51.81 MEDICATION MONITORING ENCOUNTER: ICD-10-CM

## 2018-11-08 DIAGNOSIS — M51.36 DDD (DEGENERATIVE DISC DISEASE), LUMBAR: ICD-10-CM

## 2018-11-08 DIAGNOSIS — M47.817 LUMBOSACRAL SPONDYLOSIS WITHOUT MYELOPATHY: Primary | ICD-10-CM

## 2018-11-08 DIAGNOSIS — G43.709 CHRONIC MIGRAINE WITHOUT AURA WITHOUT STATUS MIGRAINOSUS, NOT INTRACTABLE: ICD-10-CM

## 2018-11-08 DIAGNOSIS — M50.30 DEGENERATIVE CERVICAL DISC: ICD-10-CM

## 2018-11-08 DIAGNOSIS — G62.9 NEUROPATHY: ICD-10-CM

## 2018-11-08 DIAGNOSIS — M50.30 DDD (DEGENERATIVE DISC DISEASE), CERVICAL: ICD-10-CM

## 2018-11-08 DIAGNOSIS — M54.16 LUMBAR RADICULOPATHY, CHRONIC: ICD-10-CM

## 2018-11-08 DIAGNOSIS — F11.90 CHRONIC, CONTINUOUS USE OF OPIOIDS: ICD-10-CM

## 2018-11-08 DIAGNOSIS — G89.29 ENCOUNTER FOR CHRONIC PAIN MANAGEMENT: ICD-10-CM

## 2018-11-08 PROCEDURE — 99213 OFFICE O/P EST LOW 20 MIN: CPT | Performed by: NURSE PRACTITIONER

## 2018-11-08 PROCEDURE — 99213 OFFICE O/P EST LOW 20 MIN: CPT

## 2018-11-08 RX ORDER — OXYCODONE HYDROCHLORIDE AND ACETAMINOPHEN 5; 325 MG/1; MG/1
1 TABLET ORAL SEE ADMIN INSTRUCTIONS
Qty: 100 TABLET | Refills: 0 | Status: SHIPPED | OUTPATIENT
Start: 2018-11-12 | End: 2018-12-11 | Stop reason: SDUPTHER

## 2018-11-08 ASSESSMENT — ENCOUNTER SYMPTOMS
NAUSEA: 1
RESPIRATORY NEGATIVE: 1
BACK PAIN: 1

## 2018-11-08 NOTE — PROGRESS NOTES
Access [employer] Enhanced Report using either link below:   -Direct access: [email or web address]   -Enter Username, Password: [email or web address]   Username: [email or web address]   Password: [email or web address]   Performed by [facility] [address], [id number] [facility] [phone]   www. Tanja Stanley MD, Lab. Director   Performed at [address] [place] [address] [phone]                   Past Medical History:   Diagnosis Date    Allergic rhinitis     Anxiety 7/17/2013    Arthritis     Asthma     Atrial fibrillation (HCC)     Back pain     NERVE/DR. GRACIA    CAD (coronary artery disease) 3/21/2013    Caffeine use     2 coffee/day    CHF (congestive heart failure) (HCC)     Chronic kidney disease     COPD (chronic obstructive pulmonary disease) (HCC)     emphysema    Degeneration of lumbar or lumbosacral intervertebral disc     Depression     DM (diabetes mellitus) (HCC)     Emphysema of lung (HCC)     Gastritis     GERD (gastroesophageal reflux disease)     Hearing loss     Hematuria     Hiatal hernia     HTN (hypertension), benign 6/28/2014    Hypertriglyceridemia     Incontinence of urine 9/25/2013    Knee arthropathy     Lumbosacral spondylosis without myelopathy 9/29/2014    Migraine headache     DR. GRACIA    Mumps     Neuropathy     Obesity     On home oxygen therapy     2 L PER NC  HS AND PRN    HIGINIO on CPAP     Otitis media 1-26-15    Stroke (HCC)     QUESTIONABLE    Tinnitus     Type 2 diabetes mellitus without complication (HCC)     Type II or unspecified type diabetes mellitus without mention of complication, not stated as uncontrolled     UTI (lower urinary tract infection)     Varicose vein        Past Surgical History:   Procedure Laterality Date    ABLATION OF DYSRHYTHMIC FOCUS  2000    CARPAL TUNNEL RELEASE Right     CATARACT REMOVAL WITH IMPLANT Bilateral     CHOLECYSTECTOMY  2007    Relevant Medications    oxyCODONE-acetaminophen (PERCOCET) 5-325 MG per tablet (Start on 11/12/2018)    Medication monitoring encounter (Chronic)    Lumbosacral spondylosis without myelopathy - Primary (Chronic)    Relevant Medications    oxyCODONE-acetaminophen (PERCOCET) 5-325 MG per tablet (Start on 11/12/2018)    Sacroiliitis, not elsewhere classified ([address] (Chronic)    Relevant Medications    oxyCODONE-acetaminophen (PERCOCET) 5-325 MG per tablet (Start on 11/12/2018)    Obesity, morbid, BMI 40.0-49.9 (HCC) (Chronic)    DDD (degenerative disc disease), lumbar    Relevant Medications    oxyCODONE-acetaminophen (PERCOCET) 5-325 MG per tablet (Start on 11/12/2018)    Chronic, continuous use of opioids    Spondylarthrosis    Relevant Medications    oxyCODONE-acetaminophen (PERCOCET) 5-325 MG per tablet (Start on 11/12/2018)    Chronic migraine without aura without status migrainosus, not intractable    Relevant Medications    oxyCODONE-acetaminophen (PERCOCET) 5-325 MG per tablet (Start on 11/12/2018)    Lumbar radiculopathy, chronic    Neuropathy      Other Visit Diagnoses     Encounter for chronic pain management        Encounter for medication monitoring        Degeneration of lumbar or lumbosacral intervertebral disc        Relevant Medications    oxyCODONE-acetaminophen (PERCOCET) 5-325 MG per tablet (Start on 11/12/2018)    Degenerative cervical disc        Relevant Medications    oxyCODONE-acetaminophen (PERCOCET) 5-325 MG per tablet (Start on 11/12/2018)              Treatment Plan:  DISCUSSION: Treatment options discussed withpatient and all questions answered to patient's satisfaction.      Possible side effects, risk of tolerance and or dependence and alternative treatments discussed    Obtaining appropriate analgesic effect of treatment   No signs of potential drug abuse or diversion identified    [x] Ill effects of being on chronic pain medications such as sleepdisturbances, respiratory depression,

## 2018-11-16 ENCOUNTER — TELEPHONE (OUTPATIENT)
Dept: FAMILY MEDICINE CLINIC | Age: 68
End: 2018-11-16

## 2018-11-16 DIAGNOSIS — I50.9 CHRONIC CONGESTIVE HEART FAILURE, UNSPECIFIED HEART FAILURE TYPE (HCC): ICD-10-CM

## 2018-11-16 DIAGNOSIS — Z79.4 TYPE 2 DIABETES MELLITUS WITH DIABETIC POLYNEUROPATHY, WITH LONG-TERM CURRENT USE OF INSULIN (HCC): Primary | Chronic | ICD-10-CM

## 2018-11-16 DIAGNOSIS — E11.42 TYPE 2 DIABETES MELLITUS WITH DIABETIC POLYNEUROPATHY, WITH LONG-TERM CURRENT USE OF INSULIN (HCC): Primary | Chronic | ICD-10-CM

## 2018-11-16 RX ORDER — FUROSEMIDE 20 MG/1
20 TABLET ORAL DAILY PRN
Qty: 30 TABLET | Refills: 3 | Status: ON HOLD | OUTPATIENT
Start: 2018-11-16 | End: 2020-10-31

## 2018-11-16 NOTE — TELEPHONE ENCOUNTER
Nurse Ana from [address] [place] [address],José Miguel. [address] care called to verify a couple medication  Directions:  1. Nurse would like to have more specific direction for Lasix 20 mg, weight range or daily on small dose , Sig she has now is as needed . On 11/13 her weight was 247 , she gained 3 pounds from previus nurse visit (nurse didn't tell me when was a visit) today her weight was 246, hands slightly puffy , swollen ankles, Rt more then LT.1+   2. Nurse/patient  was asking for refill on Novolog , apparently patient is taking this insulin, but it was discontinued on 2/14/18 by unknow doctor . Patient is also taking Lantus 45 un am and 35 pm and metformin 1000 mg BID. Nurse is would like to confirm if patient should be on Novolog at all . Glucose is running around 170.    Patient made appt for 11/27/18 with  to review her DM medciation  Please Advice

## 2018-11-16 NOTE — TELEPHONE ENCOUNTER
Lasix  20 mg as needed for weight gain 3 lbs overnight  That means check weight every day, and if there is weight gain of 3 lbs or more overnight, take lasix 20 mg in the morning. Patient has CHF and she should have a scale in the house and check her WT every day and write it down. I'm sure that the home nurse knows what I meant.  Please give verbal order to her home nurse to educate patient about CHF monitoring as she cannot go to CHF clinic at this time as having home care

## 2018-11-19 ENCOUNTER — APPOINTMENT (OUTPATIENT)
Dept: GENERAL RADIOLOGY | Age: 68
End: 2018-11-19
Payer: COMMERCIAL

## 2018-11-19 ENCOUNTER — HOSPITAL ENCOUNTER (EMERGENCY)
Age: 68
Discharge: HOME | End: 2018-11-19
Attending: EMERGENCY MEDICINE
Payer: COMMERCIAL

## 2018-11-19 VITALS
TEMPERATURE: 98.8 F | OXYGEN SATURATION: 96 % | RESPIRATION RATE: 20 BRPM | DIASTOLIC BLOOD PRESSURE: 66 MMHG | HEART RATE: 71 BPM | BODY MASS INDEX: 45.73 KG/M2 | WEIGHT: 250 LBS | SYSTOLIC BLOOD PRESSURE: 137 MMHG

## 2018-11-19 DIAGNOSIS — J44.1 COPD EXACERBATION (HCC): Primary | ICD-10-CM

## 2018-11-19 LAB
ABSOLUTE EOS #: 0.23 K/UL (ref 0–0.44)
ABSOLUTE IMMATURE GRANULOCYTE: <0.03 K/UL (ref 0–0.3)
ABSOLUTE LYMPH #: 1.51 K/UL (ref 1.1–3.7)
ABSOLUTE MONO #: 0.83 K/UL (ref 0.1–1.2)
ANION GAP SERPL CALCULATED.3IONS-SCNC: 11 MMOL/L (ref 9–17)
BASOPHILS # BLD: 1 % (ref 0–2)
BASOPHILS ABSOLUTE: 0.06 K/UL (ref 0–0.2)
BNP INTERPRETATION: NORMAL
BUN BLDV-MCNC: 23 MG/DL (ref 8–23)
BUN/CREAT BLD: ABNORMAL (ref 9–20)
CALCIUM SERPL-MCNC: 9.3 MG/DL (ref 8.6–10.4)
CHLORIDE BLD-SCNC: 99 MMOL/L (ref 98–107)
CO2: 27 MMOL/L (ref 20–31)
CREAT SERPL-MCNC: 1.23 MG/DL (ref 0.5–0.9)
DIFFERENTIAL TYPE: ABNORMAL
EKG ATRIAL RATE: 70 BPM
EKG P AXIS: 60 DEGREES
EKG P-R INTERVAL: 142 MS
EKG Q-T INTERVAL: 350 MS
EKG QRS DURATION: 64 MS
EKG QTC CALCULATION (BAZETT): 378 MS
EKG R AXIS: -5 DEGREES
EKG T AXIS: 76 DEGREES
EKG VENTRICULAR RATE: 70 BPM
EOSINOPHILS RELATIVE PERCENT: 4 % (ref 1–4)
GFR AFRICAN AMERICAN: 53 ML/MIN
GFR NON-AFRICAN AMERICAN: 43 ML/MIN
GFR SERPL CREATININE-BSD FRML MDRD: ABNORMAL ML/MIN/{1.73_M2}
GFR SERPL CREATININE-BSD FRML MDRD: ABNORMAL ML/MIN/{1.73_M2}
GLUCOSE BLD-MCNC: 161 MG/DL (ref 70–99)
HCT VFR BLD CALC: 37.1 % (ref 36.3–47.1)
HEMOGLOBIN: 11.8 G/DL (ref 11.9–15.1)
IMMATURE GRANULOCYTES: 0 %
LYMPHOCYTES # BLD: 23 % (ref 24–43)
MCH RBC QN AUTO: 30.2 PG (ref 25.2–33.5)
MCHC RBC AUTO-ENTMCNC: 31.8 G/DL (ref 28.4–34.8)
MCV RBC AUTO: 94.9 FL (ref 82.6–102.9)
MONOCYTES # BLD: 13 % (ref 3–12)
NRBC AUTOMATED: 0 PER 100 WBC
PDW BLD-RTO: 12.7 % (ref 11.8–14.4)
PLATELET # BLD: 194 K/UL (ref 138–453)
PLATELET ESTIMATE: ABNORMAL
PMV BLD AUTO: 11.5 FL (ref 8.1–13.5)
POC TROPONIN I: 0 NG/ML (ref 0–0.1)
POC TROPONIN I: 0.01 NG/ML (ref 0–0.1)
POC TROPONIN INTERP: NORMAL
POC TROPONIN INTERP: NORMAL
POTASSIUM SERPL-SCNC: 4.3 MMOL/L (ref 3.7–5.3)
PRO-BNP: 133 PG/ML
RBC # BLD: 3.91 M/UL (ref 3.95–5.11)
RBC # BLD: ABNORMAL 10*6/UL
SEG NEUTROPHILS: 59 % (ref 36–65)
SEGMENTED NEUTROPHILS ABSOLUTE COUNT: 4 K/UL (ref 1.5–8.1)
SODIUM BLD-SCNC: 137 MMOL/L (ref 135–144)
WBC # BLD: 6.7 K/UL (ref 3.5–11.3)
WBC # BLD: ABNORMAL 10*3/UL

## 2018-11-19 PROCEDURE — 99285 EMERGENCY DEPT VISIT HI MDM: CPT

## 2018-11-19 PROCEDURE — 85025 COMPLETE CBC W/AUTO DIFF WBC: CPT

## 2018-11-19 PROCEDURE — 94664 DEMO&/EVAL PT USE INHALER: CPT

## 2018-11-19 PROCEDURE — 84484 ASSAY OF TROPONIN QUANT: CPT

## 2018-11-19 PROCEDURE — 80048 BASIC METABOLIC PNL TOTAL CA: CPT

## 2018-11-19 PROCEDURE — 6360000002 HC RX W HCPCS: Performed by: EMERGENCY MEDICINE

## 2018-11-19 PROCEDURE — 2700000000 HC OXYGEN THERAPY PER DAY

## 2018-11-19 PROCEDURE — 94762 N-INVAS EAR/PLS OXIMTRY CONT: CPT

## 2018-11-19 PROCEDURE — 93005 ELECTROCARDIOGRAM TRACING: CPT

## 2018-11-19 PROCEDURE — 94640 AIRWAY INHALATION TREATMENT: CPT

## 2018-11-19 PROCEDURE — 83880 ASSAY OF NATRIURETIC PEPTIDE: CPT

## 2018-11-19 PROCEDURE — 71045 X-RAY EXAM CHEST 1 VIEW: CPT

## 2018-11-19 PROCEDURE — 6370000000 HC RX 637 (ALT 250 FOR IP): Performed by: EMERGENCY MEDICINE

## 2018-11-19 RX ORDER — IPRATROPIUM BROMIDE AND ALBUTEROL SULFATE 2.5; .5 MG/3ML; MG/3ML
1 SOLUTION RESPIRATORY (INHALATION)
Status: DISCONTINUED | OUTPATIENT
Start: 2018-11-19 | End: 2018-11-19 | Stop reason: HOSPADM

## 2018-11-19 RX ORDER — ALBUTEROL SULFATE 90 UG/1
2 AEROSOL, METERED RESPIRATORY (INHALATION)
Status: DISCONTINUED | OUTPATIENT
Start: 2018-11-19 | End: 2018-11-19 | Stop reason: HOSPADM

## 2018-11-19 RX ORDER — PREDNISONE 10 MG/1
20 TABLET ORAL DAILY
Qty: 8 TABLET | Refills: 0 | Status: SHIPPED | OUTPATIENT
Start: 2018-11-19 | End: 2018-11-23

## 2018-11-19 RX ORDER — PREDNISONE 20 MG/1
20 TABLET ORAL ONCE
Status: COMPLETED | OUTPATIENT
Start: 2018-11-19 | End: 2018-11-19

## 2018-11-19 RX ORDER — AZITHROMYCIN 250 MG/1
500 TABLET, FILM COATED ORAL ONCE
Status: COMPLETED | OUTPATIENT
Start: 2018-11-19 | End: 2018-11-19

## 2018-11-19 RX ORDER — INSULIN ASPART 100 [IU]/ML
0-12 INJECTION, SOLUTION INTRAVENOUS; SUBCUTANEOUS
Qty: 3 ML | Refills: 0 | Status: SHIPPED | OUTPATIENT
Start: 2018-11-19 | End: 2019-03-07 | Stop reason: SDUPTHER

## 2018-11-19 RX ORDER — AZITHROMYCIN 250 MG/1
250 TABLET, FILM COATED ORAL DAILY
Qty: 4 TABLET | Refills: 0 | Status: SHIPPED | OUTPATIENT
Start: 2018-11-19 | End: 2018-11-23

## 2018-11-19 RX ORDER — ALBUTEROL SULFATE 2.5 MG/3ML
5 SOLUTION RESPIRATORY (INHALATION)
Status: DISCONTINUED | OUTPATIENT
Start: 2018-11-19 | End: 2018-11-19 | Stop reason: HOSPADM

## 2018-11-19 RX ADMIN — PREDNISONE 20 MG: 20 TABLET ORAL at 20:00

## 2018-11-19 RX ADMIN — AZITHROMYCIN 500 MG: 250 TABLET, FILM COATED ORAL at 20:00

## 2018-11-19 RX ADMIN — IPRATROPIUM BROMIDE 0.5 MG: 0.5 SOLUTION RESPIRATORY (INHALATION) at 17:40

## 2018-11-19 RX ADMIN — ALBUTEROL SULFATE 5 MG: 5 SOLUTION RESPIRATORY (INHALATION) at 17:40

## 2018-11-19 ASSESSMENT — PAIN DESCRIPTION - ONSET: ONSET: GRADUAL

## 2018-11-19 ASSESSMENT — ENCOUNTER SYMPTOMS
NAUSEA: 0
ABDOMINAL PAIN: 0
SHORTNESS OF BREATH: 1
COLOR CHANGE: 0
EYE PAIN: 0
VOMITING: 0
RHINORRHEA: 0
COUGH: 1
SORE THROAT: 0

## 2018-11-19 ASSESSMENT — PAIN DESCRIPTION - PROGRESSION: CLINICAL_PROGRESSION: GRADUALLY WORSENING

## 2018-11-19 ASSESSMENT — PAIN DESCRIPTION - ORIENTATION: ORIENTATION: LEFT;MID

## 2018-11-19 ASSESSMENT — PAIN DESCRIPTION - FREQUENCY: FREQUENCY: CONTINUOUS

## 2018-11-19 ASSESSMENT — PAIN DESCRIPTION - PAIN TYPE: TYPE: ACUTE PAIN

## 2018-11-19 ASSESSMENT — PAIN SCALES - GENERAL: PAINLEVEL_OUTOF10: 7

## 2018-11-19 ASSESSMENT — PAIN DESCRIPTION - LOCATION: LOCATION: CHEST

## 2018-11-19 ASSESSMENT — PAIN DESCRIPTION - DESCRIPTORS: DESCRIPTORS: ACHING

## 2018-11-19 NOTE — ED PROVIDER NOTES
[facility] ED  Emergency Department Encounter  EmergencyMedicine Resident     Pt Name:Mariangel Sims  MRN: [id number]  Rozinagfceline 1950  Date of evaluation: 11/19/18  PCP:  Tate Marques MD    [address] [place] [address]       Chief Complaint   Patient presents with    Shortness of Breath       HISTORY OF PRESENT ILLNESS  (Location/Symptom, Timing/Onset, Context/Setting, Quality, Duration, Modifying Factors, Severity.)      Alva Alvarez is a 75 y.o. female who presents with chief complaint of cough, congestion and shortness of breath. Symptoms have been ongoing for the past few days. States that she's had associated chills. Denies any nausea, vomiting or chest pain. States that she's had a cough productive of yellow sputum since yesterday. Has a history of COPD, states that she uses her breathing treatments as prescribed. PAST MEDICAL / SURGICAL / SOCIAL / FAMILY HISTORY      has a past medical history of Allergic rhinitis; Anxiety; Arthritis; Asthma; Atrial fibrillation ([address] Back pain; CAD (coronary artery disease); Caffeine use; CHF (congestive heart failure) ([address] Chronic kidney disease; COPD (chronic obstructive pulmonary disease) ([address] Degeneration of lumbar or lumbosacral intervertebral disc; Depression; DM (diabetes mellitus) ([address] Emphysema of lung ([address] Gastritis; GERD (gastroesophageal reflux disease); Hearing loss; Hematuria; Hiatal hernia; HTN (hypertension), benign; Hypertriglyceridemia; Incontinence of urine; Knee arthropathy; Lumbosacral spondylosis without myelopathy; Migraine headache; Mumps; Neuropathy; Obesity; On home oxygen therapy; HIGINIO on CPAP; Otitis media; Stroke ([address] Tinnitus; Type 2 diabetes mellitus without complication ([address] Type II or unspecified type diabetes mellitus without mention of complication, not stated as uncontrolled; UTI (lower urinary tract infection); and Varicose vein.      has a past surgical history that includes Cholecystectomy Nancy Warren MD   Insulin Pen Needle 31G X 6 MM MISC 1 each by Does not apply route daily 3/6/17   Dimitri Jha MD   ULTICARE SHORT PEN NEEDLES 31G X 8 MM MISC AS DIRECTED 2/5/16   Jax Winslow, APRN - CNP   docusate sodium (COLACE) 100 MG capsule Take 1 capsule by mouth 2 times daily Please use while taking Percocet 9/10/15   Kylah Guerrero MD   nystatin (MYCOSTATIN) powder Apply 3 times daily. 7/30/13   Beba Sorenson MD       REVIEW OF SYSTEMS    (2-9 systems for level 4, 10 or more for level 5)      Review of Systems   Constitutional: Positive for fever. Negative for chills. HENT: Positive for congestion. Negative for rhinorrhea and sore throat. Eyes: Negative for pain and visual disturbance. Respiratory: Positive for cough and shortness of breath. Cardiovascular: Negative for chest pain and palpitations. Gastrointestinal: Negative for abdominal pain, nausea and vomiting. Genitourinary: Negative for difficulty urinating and dysuria. Musculoskeletal: Negative for arthralgias and myalgias. Skin: Negative for color change and wound. Neurological: Negative for weakness, numbness and headaches. Psychiatric/Behavioral: Negative for behavioral problems and dysphoric mood. PHYSICAL EXAM   (up to 7 for level 4, 8 or more for level 5)      INITIAL VITALS:   /66   Pulse 71   Temp 98.8 °F (37.1 °C) (Oral)   Resp 20   Wt 250 lb (113.4 kg)   LMP  (LMP Unknown)   SpO2 96%   BMI 45.73 kg/m²     Physical Exam   Constitutional: She is oriented to person, place, and time. She appears well-developed and well-nourished. No distress. HENT:   Head: Normocephalic and atraumatic. Mouth/Throat: Oropharynx is clear and moist.   Eyes: Pupils are equal, round, and reactive to light. EOM are normal.   Neck: Normal range of motion. Cardiovascular: Normal rate and regular rhythm. Pulmonary/Chest: Effort normal. She has wheezes. She has no rales.    Diffuse expiratory wheezes throughout; no

## 2018-11-20 ENCOUNTER — TELEPHONE (OUTPATIENT)
Dept: FAMILY MEDICINE CLINIC | Age: 68
End: 2018-11-20

## 2018-11-20 NOTE — ED NOTES
Pt resting on the cot watching TV, pt states that she is continuing to have pain to the left chest. Dr. Diandra Lima advised of this pain. Dr. Diandra Lima will follow up with the patient.      Zachary Restrepo RN  11/19/18 [id number]

## 2018-12-05 ENCOUNTER — TELEPHONE (OUTPATIENT)
Dept: FAMILY MEDICINE CLINIC | Age: 68
End: 2018-12-05

## 2018-12-05 DIAGNOSIS — K21.00 GASTROESOPHAGEAL REFLUX DISEASE WITH ESOPHAGITIS: ICD-10-CM

## 2018-12-05 RX ORDER — PANTOPRAZOLE SODIUM 40 MG/1
40 TABLET, DELAYED RELEASE ORAL 2 TIMES DAILY
Qty: 60 TABLET | Refills: 3 | Status: ON HOLD | OUTPATIENT
Start: 2018-12-05 | End: 2019-03-15 | Stop reason: SDUPTHER

## 2018-12-05 NOTE — TELEPHONE ENCOUNTER
Phase I & II - metered glucose         Next Visit Date:  Future Appointments  Date Time Provider Clarence Mas   12/11/2018 10:00 AM VERONIKA Schultz - CNP [facility] None   2/6/2019 10:30 AM Tate Marques MD [id number]            Patient Active Problem List:     Chronic pain of left knee     Type 2 diabetes mellitus with diabetic polyneuropathy, with long-term current use of insulin (HCC)     Nonintractable migraine, unspecified migraine type     Coronary artery disease due to lipid rich plaque     DDD (degenerative disc disease), lumbar     Chronic, continuous use of opioids     DDD (degenerative disc disease), cervical     Osteoarthritis of cervical spine     Occipital neuralgia     Medication monitoring encounter     GERD (gastroesophageal reflux disease)     HTN (hypertension), benign     Hyperlipidemia with target LDL less than 100     Insomnia     Lumbosacral spondylosis without myelopathy     Sacroiliitis, not elsewhere classified ([address]     TIA (transient ischemic attack)     Carpal tunnel syndrome     Mixed stress and urge urinary incontinence     Bilateral low back pain with sciatica     Intractable migraine with aura without status migrainosus     Spondylarthrosis     Anxiety and depression     Chronic migraine without aura without status migrainosus, not intractable     Pulmonary embolism (HCC)     Obesity, morbid, BMI 40.0-49.9 (HCC)     Lumbar radiculopathy, chronic     Nodule of right lung     Neuropathy     Obstructive sleep apnea syndrome     Asthma with COPD ([address]     Gastroesophageal reflux disease with esophagitis     Recurrent UTI     Morbid obesity with BMI of 40.0-44.9, adult ([address]     Chest pain     Congestive heart failure ([address]     Permanent atrial fibrillation ([address]

## 2018-12-06 ENCOUNTER — TELEPHONE (OUTPATIENT)
Dept: FAMILY MEDICINE CLINIC | Age: 68
End: 2018-12-06

## 2018-12-10 DIAGNOSIS — L98.9 PERINEAL IRRITATION IN FEMALE: ICD-10-CM

## 2018-12-10 RX ORDER — NYSTATIN 100000 [USP'U]/G
POWDER TOPICAL
Qty: 3 BOTTLE | Refills: 3 | Status: SHIPPED | OUTPATIENT
Start: 2018-12-10 | End: 2020-06-25 | Stop reason: SDUPTHER

## 2018-12-11 ENCOUNTER — APPOINTMENT (OUTPATIENT)
Dept: GENERAL RADIOLOGY | Age: 68
End: 2018-12-11
Payer: COMMERCIAL

## 2018-12-11 ENCOUNTER — HOSPITAL ENCOUNTER (EMERGENCY)
Age: 68
Discharge: HOME | End: 2018-12-11
Attending: EMERGENCY MEDICINE
Payer: COMMERCIAL

## 2018-12-11 ENCOUNTER — HOSPITAL ENCOUNTER (OUTPATIENT)
Dept: PAIN MANAGEMENT | Age: 68
Discharge: HOME | End: 2018-12-11
Payer: COMMERCIAL

## 2018-12-11 VITALS
RESPIRATION RATE: 18 BRPM | TEMPERATURE: 97.6 F | HEART RATE: 70 BPM | OXYGEN SATURATION: 98 % | WEIGHT: 247 LBS | HEIGHT: 62 IN | DIASTOLIC BLOOD PRESSURE: 57 MMHG | SYSTOLIC BLOOD PRESSURE: 141 MMHG | BODY MASS INDEX: 45.45 KG/M2

## 2018-12-11 DIAGNOSIS — J44.9 COPD, MILD (HCC): ICD-10-CM

## 2018-12-11 DIAGNOSIS — G89.29 OTHER CHRONIC PAIN: ICD-10-CM

## 2018-12-11 DIAGNOSIS — R19.7 NAUSEA VOMITING AND DIARRHEA: Primary | ICD-10-CM

## 2018-12-11 DIAGNOSIS — Z79.4 TYPE 2 DIABETES MELLITUS WITH DIABETIC POLYNEUROPATHY, WITH LONG-TERM CURRENT USE OF INSULIN (HCC): Chronic | ICD-10-CM

## 2018-12-11 DIAGNOSIS — E11.42 TYPE 2 DIABETES MELLITUS WITH DIABETIC POLYNEUROPATHY, WITH LONG-TERM CURRENT USE OF INSULIN (HCC): Chronic | ICD-10-CM

## 2018-12-11 DIAGNOSIS — R11.2 NAUSEA VOMITING AND DIARRHEA: Primary | ICD-10-CM

## 2018-12-11 DIAGNOSIS — M51.36 DDD (DEGENERATIVE DISC DISEASE), LUMBAR: ICD-10-CM

## 2018-12-11 LAB
-: ABNORMAL
ABSOLUTE EOS #: 0.1 K/UL (ref 0–0.4)
ABSOLUTE IMMATURE GRANULOCYTE: ABNORMAL K/UL (ref 0–0.3)
ABSOLUTE LYMPH #: 1.4 K/UL (ref 1–4.8)
ABSOLUTE MONO #: 0.6 K/UL (ref 0.1–1.3)
ALBUMIN SERPL-MCNC: 4.2 G/DL (ref 3.5–5.2)
ALBUMIN/GLOBULIN RATIO: ABNORMAL (ref 1–2.5)
ALP BLD-CCNC: 57 U/L (ref 35–104)
ALT SERPL-CCNC: 10 U/L (ref 5–33)
AMORPHOUS: ABNORMAL
ANION GAP SERPL CALCULATED.3IONS-SCNC: 9 MMOL/L (ref 9–17)
AST SERPL-CCNC: 12 U/L
BACTERIA: ABNORMAL
BASOPHILS # BLD: 1 % (ref 0–2)
BASOPHILS ABSOLUTE: 0.1 K/UL (ref 0–0.2)
BILIRUB SERPL-MCNC: 0.16 MG/DL (ref 0.3–1.2)
BILIRUBIN URINE: NEGATIVE
BUN BLDV-MCNC: 18 MG/DL (ref 8–23)
BUN/CREAT BLD: ABNORMAL (ref 9–20)
CALCIUM SERPL-MCNC: 8.9 MG/DL (ref 8.6–10.4)
CASTS UA: ABNORMAL /LPF
CHLORIDE BLD-SCNC: 97 MMOL/L (ref 98–107)
CO2: 29 MMOL/L (ref 20–31)
COLOR: YELLOW
COMMENT UA: ABNORMAL
CREAT SERPL-MCNC: 1.12 MG/DL (ref 0.5–0.9)
CRYSTALS, UA: ABNORMAL /HPF
DIFFERENTIAL TYPE: ABNORMAL
EKG ATRIAL RATE: 58 BPM
EKG P AXIS: 59 DEGREES
EKG P-R INTERVAL: 148 MS
EKG Q-T INTERVAL: 398 MS
EKG QRS DURATION: 68 MS
EKG QTC CALCULATION (BAZETT): 390 MS
EKG R AXIS: -5 DEGREES
EKG T AXIS: 70 DEGREES
EKG VENTRICULAR RATE: 58 BPM
EOSINOPHILS RELATIVE PERCENT: 1 % (ref 0–4)
EPITHELIAL CELLS UA: ABNORMAL /HPF
GFR AFRICAN AMERICAN: 59 ML/MIN
GFR NON-AFRICAN AMERICAN: 48 ML/MIN
GFR SERPL CREATININE-BSD FRML MDRD: ABNORMAL ML/MIN/{1.73_M2}
GFR SERPL CREATININE-BSD FRML MDRD: ABNORMAL ML/MIN/{1.73_M2}
GLUCOSE BLD-MCNC: 150 MG/DL (ref 70–99)
GLUCOSE URINE: NEGATIVE
HCT VFR BLD CALC: 38.1 % (ref 36–46)
HEMOGLOBIN: 12.6 G/DL (ref 12–16)
IMMATURE GRANULOCYTES: ABNORMAL %
KETONES, URINE: NEGATIVE
LEUKOCYTE ESTERASE, URINE: ABNORMAL
LIPASE: 47 U/L (ref 13–60)
LYMPHOCYTES # BLD: 19 % (ref 24–44)
MCH RBC QN AUTO: 30.4 PG (ref 26–34)
MCHC RBC AUTO-ENTMCNC: 33 G/DL (ref 31–37)
MCV RBC AUTO: 92 FL (ref 80–100)
MONOCYTES # BLD: 8 % (ref 1–7)
MUCUS: ABNORMAL
NITRITE, URINE: NEGATIVE
NRBC AUTOMATED: ABNORMAL PER 100 WBC
OTHER OBSERVATIONS UA: ABNORMAL
PDW BLD-RTO: 13.9 % (ref 11.5–14.9)
PH UA: 5 (ref 5–8)
PLATELET # BLD: 188 K/UL (ref 150–450)
PLATELET ESTIMATE: ABNORMAL
PMV BLD AUTO: 9 FL (ref 6–12)
POTASSIUM SERPL-SCNC: 4.5 MMOL/L (ref 3.7–5.3)
PROTEIN UA: NEGATIVE
RBC # BLD: 4.15 M/UL (ref 4–5.2)
RBC # BLD: ABNORMAL 10*6/UL
RBC UA: ABNORMAL /HPF
RENAL EPITHELIAL, UA: ABNORMAL /HPF
SEG NEUTROPHILS: 71 % (ref 36–66)
SEGMENTED NEUTROPHILS ABSOLUTE COUNT: 5.3 K/UL (ref 1.3–9.1)
SODIUM BLD-SCNC: 135 MMOL/L (ref 135–144)
SPECIFIC GRAVITY UA: 1.02 (ref 1–1.03)
TOTAL PROTEIN: 7.4 G/DL (ref 6.4–8.3)
TRICHOMONAS: ABNORMAL
TROPONIN INTERP: NORMAL
TROPONIN INTERP: NORMAL
TROPONIN T: <0.03 NG/ML
TROPONIN T: <0.03 NG/ML
TURBIDITY: CLEAR
URINE HGB: NEGATIVE
UROBILINOGEN, URINE: NORMAL
WBC # BLD: 7.5 K/UL (ref 3.5–11)
WBC # BLD: ABNORMAL 10*3/UL
WBC UA: ABNORMAL /HPF
YEAST: ABNORMAL

## 2018-12-11 PROCEDURE — 93005 ELECTROCARDIOGRAM TRACING: CPT

## 2018-12-11 PROCEDURE — 83690 ASSAY OF LIPASE: CPT

## 2018-12-11 PROCEDURE — 36415 COLL VENOUS BLD VENIPUNCTURE: CPT

## 2018-12-11 PROCEDURE — 94664 DEMO&/EVAL PT USE INHALER: CPT

## 2018-12-11 PROCEDURE — 6370000000 HC RX 637 (ALT 250 FOR IP): Performed by: EMERGENCY MEDICINE

## 2018-12-11 PROCEDURE — 96374 THER/PROPH/DIAG INJ IV PUSH: CPT

## 2018-12-11 PROCEDURE — 6360000002 HC RX W HCPCS: Performed by: EMERGENCY MEDICINE

## 2018-12-11 PROCEDURE — 81001 URINALYSIS AUTO W/SCOPE: CPT

## 2018-12-11 PROCEDURE — 96375 TX/PRO/DX INJ NEW DRUG ADDON: CPT

## 2018-12-11 PROCEDURE — 99284 EMERGENCY DEPT VISIT MOD MDM: CPT

## 2018-12-11 PROCEDURE — 80053 COMPREHEN METABOLIC PANEL: CPT

## 2018-12-11 PROCEDURE — 85025 COMPLETE CBC W/AUTO DIFF WBC: CPT

## 2018-12-11 PROCEDURE — 94762 N-INVAS EAR/PLS OXIMTRY CONT: CPT

## 2018-12-11 PROCEDURE — 84484 ASSAY OF TROPONIN QUANT: CPT

## 2018-12-11 PROCEDURE — 87086 URINE CULTURE/COLONY COUNT: CPT

## 2018-12-11 PROCEDURE — 74022 RADEX COMPL AQT ABD SERIES: CPT

## 2018-12-11 PROCEDURE — 2580000003 HC RX 258: Performed by: EMERGENCY MEDICINE

## 2018-12-11 PROCEDURE — 94640 AIRWAY INHALATION TREATMENT: CPT

## 2018-12-11 RX ORDER — 0.9 % SODIUM CHLORIDE 0.9 %
1000 INTRAVENOUS SOLUTION INTRAVENOUS ONCE
Status: COMPLETED | OUTPATIENT
Start: 2018-12-11 | End: 2018-12-11

## 2018-12-11 RX ORDER — ONDANSETRON 2 MG/ML
4 INJECTION INTRAMUSCULAR; INTRAVENOUS ONCE
Status: COMPLETED | OUTPATIENT
Start: 2018-12-11 | End: 2018-12-11

## 2018-12-11 RX ORDER — IPRATROPIUM BROMIDE AND ALBUTEROL SULFATE 2.5; .5 MG/3ML; MG/3ML
1 SOLUTION RESPIRATORY (INHALATION)
Status: DISCONTINUED | OUTPATIENT
Start: 2018-12-11 | End: 2018-12-11 | Stop reason: HOSPADM

## 2018-12-11 RX ORDER — ALBUTEROL SULFATE 2.5 MG/3ML
5 SOLUTION RESPIRATORY (INHALATION)
Status: DISCONTINUED | OUTPATIENT
Start: 2018-12-11 | End: 2018-12-11 | Stop reason: HOSPADM

## 2018-12-11 RX ORDER — ALBUTEROL SULFATE 90 UG/1
2 AEROSOL, METERED RESPIRATORY (INHALATION)
Status: DISCONTINUED | OUTPATIENT
Start: 2018-12-11 | End: 2018-12-11 | Stop reason: HOSPADM

## 2018-12-11 RX ORDER — ONDANSETRON 4 MG/1
4 TABLET, ORALLY DISINTEGRATING ORAL ONCE
Status: COMPLETED | OUTPATIENT
Start: 2018-12-11 | End: 2018-12-11

## 2018-12-11 RX ORDER — ACETAMINOPHEN 500 MG
1000 TABLET ORAL ONCE
Status: COMPLETED | OUTPATIENT
Start: 2018-12-11 | End: 2018-12-11

## 2018-12-11 RX ORDER — KETOROLAC TROMETHAMINE 30 MG/ML
15 INJECTION, SOLUTION INTRAMUSCULAR; INTRAVENOUS ONCE
Status: COMPLETED | OUTPATIENT
Start: 2018-12-11 | End: 2018-12-11

## 2018-12-11 RX ORDER — ONDANSETRON 4 MG/1
4 TABLET, ORALLY DISINTEGRATING ORAL EVERY 8 HOURS PRN
Qty: 8 TABLET | Refills: 0 | Status: SHIPPED | OUTPATIENT
Start: 2018-12-11 | End: 2019-04-24 | Stop reason: ALTCHOICE

## 2018-12-11 RX ORDER — DEXAMETHASONE SODIUM PHOSPHATE 4 MG/ML
10 INJECTION, SOLUTION INTRA-ARTICULAR; INTRALESIONAL; INTRAMUSCULAR; INTRAVENOUS; SOFT TISSUE ONCE
Status: COMPLETED | OUTPATIENT
Start: 2018-12-11 | End: 2018-12-11

## 2018-12-11 RX ORDER — OXYCODONE HYDROCHLORIDE AND ACETAMINOPHEN 5; 325 MG/1; MG/1
1 TABLET ORAL SEE ADMIN INSTRUCTIONS
Qty: 100 TABLET | Refills: 0 | Status: SHIPPED | OUTPATIENT
Start: 2018-12-12 | End: 2019-01-11

## 2018-12-11 RX ADMIN — IPRATROPIUM BROMIDE AND ALBUTEROL SULFATE 1 AMPULE: .5; 3 SOLUTION RESPIRATORY (INHALATION) at 15:49

## 2018-12-11 RX ADMIN — ONDANSETRON 4 MG: 2 INJECTION INTRAMUSCULAR; INTRAVENOUS at 12:34

## 2018-12-11 RX ADMIN — DEXAMETHASONE SODIUM PHOSPHATE 10 MG: 4 INJECTION, SOLUTION INTRAMUSCULAR; INTRAVENOUS at 14:59

## 2018-12-11 RX ADMIN — ACETAMINOPHEN 1000 MG: 500 TABLET, FILM COATED ORAL at 12:47

## 2018-12-11 RX ADMIN — SODIUM CHLORIDE 1000 ML: 9 INJECTION, SOLUTION INTRAVENOUS at 12:35

## 2018-12-11 RX ADMIN — ONDANSETRON 4 MG: 4 TABLET, ORALLY DISINTEGRATING ORAL at 16:33

## 2018-12-11 RX ADMIN — KETOROLAC TROMETHAMINE 15 MG: 30 INJECTION, SOLUTION INTRAMUSCULAR at 15:00

## 2018-12-11 ASSESSMENT — ENCOUNTER SYMPTOMS
BACK PAIN: 0
SHORTNESS OF BREATH: 0
SORE THROAT: 0
VOMITING: 1
COUGH: 0
ABDOMINAL PAIN: 0
DIARRHEA: 1
NAUSEA: 1
EYE PAIN: 0

## 2018-12-11 ASSESSMENT — PAIN SCALES - GENERAL
PAINLEVEL_OUTOF10: 10
PAINLEVEL_OUTOF10: 0
PAINLEVEL_OUTOF10: 8

## 2018-12-11 ASSESSMENT — PAIN DESCRIPTION - PAIN TYPE: TYPE: CHRONIC PAIN

## 2018-12-11 NOTE — ED PROVIDER NOTES
reports below, unless otherwise noted in the medical decision-making or here. XR Acute Abd Series Chest 1 VW   Final Result   No acute airspace disease identified. No evidence for bowel obstruction or free air. LABS:  Labs Reviewed   CBC WITH AUTO DIFFERENTIAL - Abnormal; Notable for the following:        Result Value    Seg Neutrophils 71 (*)     Lymphocytes 19 (*)     Monocytes 8 (*)     All other components within normal limits   COMPREHENSIVE METABOLIC PANEL - Abnormal; Notable for the following:     Glucose 150 (*)     CREATININE 1.12 (*)     Chloride 97 (*)     Total Bilirubin 0.16 (*)     GFR Non- 48 (*)     GFR  59 (*)     All other components within normal limits   URINE RT REFLEX TO CULTURE - Abnormal; Notable for the following:     Leukocyte Esterase, Urine MOD (*)     All other components within normal limits   MICROSCOPIC URINALYSIS - Abnormal; Notable for the following:     Bacteria, UA FEW (*)     Amorphous, UA 1+ (*)     All other components within normal limits   URINE CULTURE CLEAN CATCH   TROPONIN   TROPONIN   LIPASE         EMERGENCY DEPARTMENTCOURSE:   Medical Decision Making:  N/v/d, nonspecific however due to age and medical h/o CHF, CAD, will get cards eval, zofran and fluid bolus. On exam patient faint wheezes, h/o copd will give decadron for mild copd, pt does not want breathing tx. EKG is nondx, 1st trop neg, low suspicion for acs dissection, PE. Eval nondx, pt feeling better after fluids no asx, no uti, ok to d/c home with 2nd neg trop. Patient instructed to follow-up with PCP or return to the nearest emergency department for uncontrolled fevers, vomiting, shortness of breath, chest pain, or any other concerns. Patient acknowledges plan, voices understanding of it and is in agreement with it.     Pre-hypertension/Hypertension: Patient has been informed thatthey may have pre-hypertension or Hypertension based on a blood

## 2018-12-12 ENCOUNTER — TELEPHONE (OUTPATIENT)
Dept: FAMILY MEDICINE CLINIC | Age: 68
End: 2018-12-12

## 2018-12-12 LAB
CULTURE: NORMAL
Lab: NORMAL
SPECIMEN DESCRIPTION: NORMAL
STATUS: NORMAL

## 2018-12-12 RX ORDER — MAGNESIUM OXIDE 400 MG/1
TABLET ORAL
Qty: 60 TABLET | Refills: 0 | Status: SHIPPED | OUTPATIENT
Start: 2018-12-12 | End: 2019-02-08 | Stop reason: SDUPTHER

## 2018-12-12 RX ORDER — FERROUS SULFATE 325(65) MG
TABLET ORAL
Qty: 30 TABLET | Refills: 0 | Status: SHIPPED | OUTPATIENT
Start: 2018-12-12 | End: 2019-02-08 | Stop reason: SDUPTHER

## 2018-12-12 RX ORDER — INSULIN GLARGINE 100 [IU]/ML
INJECTION, SOLUTION SUBCUTANEOUS
Qty: 30 ML | Refills: 0 | Status: SHIPPED | OUTPATIENT
Start: 2018-12-12 | End: 2019-03-04 | Stop reason: SDUPTHER

## 2018-12-12 RX ORDER — ISOPROPYL ALCOHOL 0.75 G/1
SWAB TOPICAL
Qty: 100 EACH | Refills: 0 | Status: SHIPPED | OUTPATIENT
Start: 2018-12-12 | End: 2022-07-25

## 2018-12-12 RX ORDER — PEN NEEDLE, DIABETIC 29 G X1/2"
NEEDLE, DISPOSABLE MISCELLANEOUS
Qty: 100 EACH | Refills: 0 | Status: SHIPPED | OUTPATIENT
Start: 2018-12-12 | End: 2019-08-02 | Stop reason: SDUPTHER

## 2018-12-12 RX ORDER — CHOLECALCIFEROL (VITAMIN D3) 125 MCG
CAPSULE ORAL
Qty: 30 TABLET | Refills: 0 | Status: SHIPPED | OUTPATIENT
Start: 2018-12-12 | End: 2019-02-14 | Stop reason: SDUPTHER

## 2018-12-21 RX ORDER — TOPIRAMATE 100 MG/1
100 TABLET, FILM COATED ORAL NIGHTLY
Qty: 90 TABLET | Refills: 0 | Status: SHIPPED | OUTPATIENT
Start: 2018-12-21 | End: 2019-01-14 | Stop reason: SDUPTHER

## 2019-01-07 ENCOUNTER — APPOINTMENT (OUTPATIENT)
Dept: CT IMAGING | Age: 69
End: 2019-01-07
Payer: COMMERCIAL

## 2019-01-07 ENCOUNTER — APPOINTMENT (OUTPATIENT)
Dept: GENERAL RADIOLOGY | Age: 69
End: 2019-01-07
Payer: COMMERCIAL

## 2019-01-07 ENCOUNTER — HOSPITAL ENCOUNTER (OUTPATIENT)
Age: 69
Setting detail: OBSERVATION
Discharge: HOME | End: 2019-01-09
Attending: EMERGENCY MEDICINE | Admitting: EMERGENCY MEDICINE
Payer: COMMERCIAL

## 2019-01-07 DIAGNOSIS — M51.36 DDD (DEGENERATIVE DISC DISEASE), LUMBAR: ICD-10-CM

## 2019-01-07 DIAGNOSIS — R07.9 CHEST PAIN, UNSPECIFIED TYPE: ICD-10-CM

## 2019-01-07 DIAGNOSIS — M47.817 LUMBOSACRAL SPONDYLOSIS WITHOUT MYELOPATHY: Chronic | ICD-10-CM

## 2019-01-07 DIAGNOSIS — M46.1 SACROILIITIS, NOT ELSEWHERE CLASSIFIED (HCC): Chronic | ICD-10-CM

## 2019-01-07 DIAGNOSIS — S39.012A STRAIN OF LUMBAR REGION, INITIAL ENCOUNTER: Primary | ICD-10-CM

## 2019-01-07 DIAGNOSIS — N30.00 ACUTE CYSTITIS WITHOUT HEMATURIA: ICD-10-CM

## 2019-01-07 LAB
ABSOLUTE EOS #: 0.13 K/UL (ref 0–0.44)
ABSOLUTE IMMATURE GRANULOCYTE: 0.03 K/UL (ref 0–0.3)
ABSOLUTE LYMPH #: 2.11 K/UL (ref 1.1–3.7)
ABSOLUTE MONO #: 0.7 K/UL (ref 0.1–1.2)
ANION GAP SERPL CALCULATED.3IONS-SCNC: 11 MMOL/L (ref 9–17)
BASOPHILS # BLD: 1 % (ref 0–2)
BASOPHILS ABSOLUTE: 0.05 K/UL (ref 0–0.2)
BUN BLDV-MCNC: 21 MG/DL (ref 8–23)
BUN/CREAT BLD: ABNORMAL (ref 9–20)
CALCIUM SERPL-MCNC: 8.8 MG/DL (ref 8.6–10.4)
CHLORIDE BLD-SCNC: 96 MMOL/L (ref 98–107)
CO2: 26 MMOL/L (ref 20–31)
CREAT SERPL-MCNC: 1.03 MG/DL (ref 0.5–0.9)
DIFFERENTIAL TYPE: NORMAL
EKG ATRIAL RATE: 73 BPM
EKG P AXIS: 64 DEGREES
EKG P-R INTERVAL: 136 MS
EKG Q-T INTERVAL: 376 MS
EKG QRS DURATION: 68 MS
EKG QTC CALCULATION (BAZETT): 414 MS
EKG R AXIS: -15 DEGREES
EKG T AXIS: 67 DEGREES
EKG VENTRICULAR RATE: 73 BPM
EOSINOPHILS RELATIVE PERCENT: 2 % (ref 1–4)
GFR AFRICAN AMERICAN: >60 ML/MIN
GFR NON-AFRICAN AMERICAN: 53 ML/MIN
GFR SERPL CREATININE-BSD FRML MDRD: ABNORMAL ML/MIN/{1.73_M2}
GFR SERPL CREATININE-BSD FRML MDRD: ABNORMAL ML/MIN/{1.73_M2}
GLUCOSE BLD-MCNC: 161 MG/DL (ref 70–99)
HCT VFR BLD CALC: 38.4 % (ref 36.3–47.1)
HEMOGLOBIN: 11.9 G/DL (ref 11.9–15.1)
IMMATURE GRANULOCYTES: 0 %
LYMPHOCYTES # BLD: 29 % (ref 24–43)
MCH RBC QN AUTO: 29.8 PG (ref 25.2–33.5)
MCHC RBC AUTO-ENTMCNC: 31 G/DL (ref 28.4–34.8)
MCV RBC AUTO: 96.2 FL (ref 82.6–102.9)
MONOCYTES # BLD: 10 % (ref 3–12)
NRBC AUTOMATED: 0 PER 100 WBC
PDW BLD-RTO: 13.2 % (ref 11.8–14.4)
PLATELET # BLD: 219 K/UL (ref 138–453)
PLATELET ESTIMATE: NORMAL
PMV BLD AUTO: 11 FL (ref 8.1–13.5)
POTASSIUM SERPL-SCNC: 4.2 MMOL/L (ref 3.7–5.3)
RBC # BLD: 3.99 M/UL (ref 3.95–5.11)
RBC # BLD: NORMAL 10*6/UL
SEG NEUTROPHILS: 58 % (ref 36–65)
SEGMENTED NEUTROPHILS ABSOLUTE COUNT: 4.27 K/UL (ref 1.5–8.1)
SODIUM BLD-SCNC: 133 MMOL/L (ref 135–144)
TROPONIN INTERP: NORMAL
TROPONIN INTERP: NORMAL
TROPONIN T: NORMAL NG/ML
TROPONIN T: NORMAL NG/ML
TROPONIN, HIGH SENSITIVITY: 6 NG/L (ref 0–14)
TROPONIN, HIGH SENSITIVITY: <6 NG/L (ref 0–14)
WBC # BLD: 7.3 K/UL (ref 3.5–11.3)
WBC # BLD: NORMAL 10*3/UL

## 2019-01-07 PROCEDURE — 93005 ELECTROCARDIOGRAM TRACING: CPT

## 2019-01-07 PROCEDURE — G0378 HOSPITAL OBSERVATION PER HR: HCPCS

## 2019-01-07 PROCEDURE — 85025 COMPLETE CBC W/AUTO DIFF WBC: CPT

## 2019-01-07 PROCEDURE — 71046 X-RAY EXAM CHEST 2 VIEWS: CPT

## 2019-01-07 PROCEDURE — 72131 CT LUMBAR SPINE W/O DYE: CPT

## 2019-01-07 PROCEDURE — 6370000000 HC RX 637 (ALT 250 FOR IP): Performed by: EMERGENCY MEDICINE

## 2019-01-07 PROCEDURE — 84484 ASSAY OF TROPONIN QUANT: CPT

## 2019-01-07 PROCEDURE — 99285 EMERGENCY DEPT VISIT HI MDM: CPT

## 2019-01-07 PROCEDURE — 80048 BASIC METABOLIC PNL TOTAL CA: CPT

## 2019-01-07 RX ORDER — ASPIRIN 81 MG/1
324 TABLET, CHEWABLE ORAL ONCE
Status: COMPLETED | OUTPATIENT
Start: 2019-01-07 | End: 2019-01-07

## 2019-01-07 RX ADMIN — ASPIRIN 81 MG 324 MG: 81 TABLET ORAL at 18:59

## 2019-01-07 ASSESSMENT — PAIN DESCRIPTION - LOCATION: LOCATION: BACK

## 2019-01-07 ASSESSMENT — PAIN SCALES - GENERAL: PAINLEVEL_OUTOF10: 9

## 2019-01-07 ASSESSMENT — PAIN DESCRIPTION - DESCRIPTORS: DESCRIPTORS: ACHING;SHARP;TINGLING

## 2019-01-08 ENCOUNTER — APPOINTMENT (OUTPATIENT)
Dept: MRI IMAGING | Age: 69
End: 2019-01-08
Payer: COMMERCIAL

## 2019-01-08 LAB
-: ABNORMAL
AMORPHOUS: ABNORMAL
BACTERIA: ABNORMAL
BILIRUBIN URINE: NEGATIVE
CASTS UA: ABNORMAL /LPF (ref 0–8)
COLOR: YELLOW
COMMENT UA: ABNORMAL
CRYSTALS, UA: ABNORMAL /HPF
EPITHELIAL CELLS UA: ABNORMAL /HPF (ref 0–5)
GLUCOSE BLD-MCNC: 134 MG/DL (ref 65–105)
GLUCOSE BLD-MCNC: 184 MG/DL (ref 65–105)
GLUCOSE BLD-MCNC: 216 MG/DL (ref 65–105)
GLUCOSE URINE: NEGATIVE
KETONES, URINE: NEGATIVE
LEUKOCYTE ESTERASE, URINE: ABNORMAL
MUCUS: ABNORMAL
NITRITE, URINE: NEGATIVE
OTHER OBSERVATIONS UA: ABNORMAL
PH UA: 5.5 (ref 5–8)
PROTEIN UA: NEGATIVE
RBC UA: ABNORMAL /HPF (ref 0–4)
RENAL EPITHELIAL, UA: ABNORMAL /HPF
SPECIFIC GRAVITY UA: 1.02 (ref 1–1.03)
TRICHOMONAS: ABNORMAL
TURBIDITY: ABNORMAL
URINE HGB: NEGATIVE
UROBILINOGEN, URINE: NORMAL
WBC UA: ABNORMAL /HPF (ref 0–5)
YEAST: ABNORMAL

## 2019-01-08 PROCEDURE — 87088 URINE BACTERIA CULTURE: CPT

## 2019-01-08 PROCEDURE — 97530 THERAPEUTIC ACTIVITIES: CPT

## 2019-01-08 PROCEDURE — 82947 ASSAY GLUCOSE BLOOD QUANT: CPT

## 2019-01-08 PROCEDURE — 6370000000 HC RX 637 (ALT 250 FOR IP): Performed by: STUDENT IN AN ORGANIZED HEALTH CARE EDUCATION/TRAINING PROGRAM

## 2019-01-08 PROCEDURE — 6370000000 HC RX 637 (ALT 250 FOR IP): Performed by: EMERGENCY MEDICINE

## 2019-01-08 PROCEDURE — 97166 OT EVAL MOD COMPLEX 45 MIN: CPT

## 2019-01-08 PROCEDURE — G0378 HOSPITAL OBSERVATION PER HR: HCPCS

## 2019-01-08 PROCEDURE — 97535 SELF CARE MNGMENT TRAINING: CPT

## 2019-01-08 PROCEDURE — 81001 URINALYSIS AUTO W/SCOPE: CPT

## 2019-01-08 PROCEDURE — 72148 MRI LUMBAR SPINE W/O DYE: CPT

## 2019-01-08 PROCEDURE — 94660 CPAP INITIATION&MGMT: CPT

## 2019-01-08 PROCEDURE — 97162 PT EVAL MOD COMPLEX 30 MIN: CPT

## 2019-01-08 PROCEDURE — 2580000003 HC RX 258: Performed by: EMERGENCY MEDICINE

## 2019-01-08 PROCEDURE — 87086 URINE CULTURE/COLONY COUNT: CPT

## 2019-01-08 PROCEDURE — 87186 SC STD MICRODIL/AGAR DIL: CPT

## 2019-01-08 RX ORDER — CARVEDILOL 3.12 MG/1
3.12 TABLET ORAL 2 TIMES DAILY WITH MEALS
Status: DISCONTINUED | OUTPATIENT
Start: 2019-01-08 | End: 2019-01-09 | Stop reason: HOSPADM

## 2019-01-08 RX ORDER — AMLODIPINE BESYLATE 5 MG/1
5 TABLET ORAL DAILY
Status: DISCONTINUED | OUTPATIENT
Start: 2019-01-08 | End: 2019-01-09 | Stop reason: HOSPADM

## 2019-01-08 RX ORDER — DOCUSATE SODIUM 100 MG/1
100 CAPSULE, LIQUID FILLED ORAL 2 TIMES DAILY
Status: DISCONTINUED | OUTPATIENT
Start: 2019-01-08 | End: 2019-01-09 | Stop reason: HOSPADM

## 2019-01-08 RX ORDER — ONDANSETRON 4 MG/1
4 TABLET, ORALLY DISINTEGRATING ORAL EVERY 8 HOURS PRN
Status: DISCONTINUED | OUTPATIENT
Start: 2019-01-08 | End: 2019-01-09 | Stop reason: HOSPADM

## 2019-01-08 RX ORDER — OXYCODONE HYDROCHLORIDE AND ACETAMINOPHEN 5; 325 MG/1; MG/1
1 TABLET ORAL EVERY 4 HOURS PRN
Status: DISCONTINUED | OUTPATIENT
Start: 2019-01-08 | End: 2019-01-09 | Stop reason: HOSPADM

## 2019-01-08 RX ORDER — NITROGLYCERIN 0.4 MG/1
0.4 TABLET SUBLINGUAL EVERY 5 MIN PRN
Status: DISCONTINUED | OUTPATIENT
Start: 2019-01-08 | End: 2019-01-09 | Stop reason: HOSPADM

## 2019-01-08 RX ORDER — PANTOPRAZOLE SODIUM 40 MG/1
40 TABLET, DELAYED RELEASE ORAL 2 TIMES DAILY
Status: DISCONTINUED | OUTPATIENT
Start: 2019-01-08 | End: 2019-01-09 | Stop reason: HOSPADM

## 2019-01-08 RX ORDER — CITALOPRAM 20 MG/1
40 TABLET ORAL DAILY
Status: DISCONTINUED | OUTPATIENT
Start: 2019-01-08 | End: 2019-01-08

## 2019-01-08 RX ORDER — OXYCODONE HYDROCHLORIDE AND ACETAMINOPHEN 5; 325 MG/1; MG/1
2 TABLET ORAL EVERY 4 HOURS PRN
Status: DISCONTINUED | OUTPATIENT
Start: 2019-01-08 | End: 2019-01-09 | Stop reason: HOSPADM

## 2019-01-08 RX ORDER — TIZANIDINE 2 MG/1
2 TABLET ORAL EVERY 12 HOURS PRN
Status: DISCONTINUED | OUTPATIENT
Start: 2019-01-08 | End: 2019-01-09 | Stop reason: HOSPADM

## 2019-01-08 RX ORDER — CEPHALEXIN 500 MG/1
500 CAPSULE ORAL EVERY 12 HOURS SCHEDULED
Status: DISCONTINUED | OUTPATIENT
Start: 2019-01-08 | End: 2019-01-09 | Stop reason: HOSPADM

## 2019-01-08 RX ORDER — CEPHALEXIN 500 MG/1
500 CAPSULE ORAL ONCE
Status: COMPLETED | OUTPATIENT
Start: 2019-01-08 | End: 2019-01-08

## 2019-01-08 RX ORDER — ISOSORBIDE MONONITRATE 30 MG/1
30 TABLET, EXTENDED RELEASE ORAL DAILY
Status: DISCONTINUED | OUTPATIENT
Start: 2019-01-08 | End: 2019-01-09 | Stop reason: HOSPADM

## 2019-01-08 RX ORDER — OXYCODONE HYDROCHLORIDE AND ACETAMINOPHEN 5; 325 MG/1; MG/1
1 TABLET ORAL SEE ADMIN INSTRUCTIONS
Status: DISCONTINUED | OUTPATIENT
Start: 2019-01-08 | End: 2019-01-08

## 2019-01-08 RX ORDER — ACETAMINOPHEN 325 MG/1
650 TABLET ORAL EVERY 4 HOURS PRN
Status: DISCONTINUED | OUTPATIENT
Start: 2019-01-08 | End: 2019-01-09 | Stop reason: HOSPADM

## 2019-01-08 RX ORDER — FUROSEMIDE 20 MG/1
20 TABLET ORAL DAILY PRN
Status: DISCONTINUED | OUTPATIENT
Start: 2019-01-08 | End: 2019-01-09 | Stop reason: HOSPADM

## 2019-01-08 RX ORDER — ONDANSETRON 2 MG/ML
4 INJECTION INTRAMUSCULAR; INTRAVENOUS EVERY 8 HOURS PRN
Status: DISCONTINUED | OUTPATIENT
Start: 2019-01-08 | End: 2019-01-09 | Stop reason: HOSPADM

## 2019-01-08 RX ORDER — CHOLECALCIFEROL (VITAMIN D3) 125 MCG
1000 CAPSULE ORAL DAILY
Status: DISCONTINUED | OUTPATIENT
Start: 2019-01-08 | End: 2019-01-09 | Stop reason: HOSPADM

## 2019-01-08 RX ORDER — UREA 10 %
10 LOTION (ML) TOPICAL NIGHTLY PRN
Status: DISCONTINUED | OUTPATIENT
Start: 2019-01-08 | End: 2019-01-09 | Stop reason: HOSPADM

## 2019-01-08 RX ORDER — TOPIRAMATE 100 MG/1
100 TABLET, FILM COATED ORAL NIGHTLY
Status: DISCONTINUED | OUTPATIENT
Start: 2019-01-08 | End: 2019-01-09 | Stop reason: HOSPADM

## 2019-01-08 RX ORDER — CITALOPRAM 20 MG/1
20 TABLET ORAL DAILY
Status: DISCONTINUED | OUTPATIENT
Start: 2019-01-08 | End: 2019-01-09 | Stop reason: HOSPADM

## 2019-01-08 RX ORDER — CITALOPRAM 20 MG/1
20 TABLET ORAL DAILY
Status: DISCONTINUED | OUTPATIENT
Start: 2019-01-09 | End: 2019-01-08

## 2019-01-08 RX ORDER — SODIUM CHLORIDE 0.9 % (FLUSH) 0.9 %
10 SYRINGE (ML) INJECTION EVERY 12 HOURS SCHEDULED
Status: DISCONTINUED | OUTPATIENT
Start: 2019-01-08 | End: 2019-01-09 | Stop reason: HOSPADM

## 2019-01-08 RX ORDER — DICYCLOMINE HYDROCHLORIDE 10 MG/1
20 CAPSULE ORAL EVERY 6 HOURS
Status: DISCONTINUED | OUTPATIENT
Start: 2019-01-08 | End: 2019-01-09 | Stop reason: HOSPADM

## 2019-01-08 RX ORDER — LOSARTAN POTASSIUM 25 MG/1
25 TABLET ORAL DAILY
Status: DISCONTINUED | OUTPATIENT
Start: 2019-01-08 | End: 2019-01-09 | Stop reason: HOSPADM

## 2019-01-08 RX ORDER — SODIUM CHLORIDE 0.9 % (FLUSH) 0.9 %
10 SYRINGE (ML) INJECTION PRN
Status: DISCONTINUED | OUTPATIENT
Start: 2019-01-08 | End: 2019-01-09 | Stop reason: HOSPADM

## 2019-01-08 RX ADMIN — NITROGLYCERIN 0.4 MG: 0.4 TABLET SUBLINGUAL at 00:29

## 2019-01-08 RX ADMIN — CITALOPRAM HYDROBROMIDE 20 MG: 20 TABLET ORAL at 21:57

## 2019-01-08 RX ADMIN — TOPIRAMATE 100 MG: 100 TABLET, FILM COATED ORAL at 20:32

## 2019-01-08 RX ADMIN — DICYCLOMINE HYDROCHLORIDE 20 MG: 10 CAPSULE ORAL at 20:32

## 2019-01-08 RX ADMIN — TIZANIDINE 2 MG: 2 TABLET ORAL at 10:02

## 2019-01-08 RX ADMIN — AMLODIPINE BESYLATE 5 MG: 5 TABLET ORAL at 10:01

## 2019-01-08 RX ADMIN — ISOSORBIDE MONONITRATE 30 MG: 30 TABLET ORAL at 10:02

## 2019-01-08 RX ADMIN — DOCUSATE SODIUM 100 MG: 100 TABLET, FILM COATED ORAL at 10:06

## 2019-01-08 RX ADMIN — PANTOPRAZOLE SODIUM 40 MG: 40 TABLET, DELAYED RELEASE ORAL at 20:34

## 2019-01-08 RX ADMIN — Medication 10 ML: at 20:34

## 2019-01-08 RX ADMIN — Medication 1000 MCG: at 12:40

## 2019-01-08 RX ADMIN — Medication 10 MG: at 21:57

## 2019-01-08 RX ADMIN — CEPHALEXIN 500 MG: 500 CAPSULE ORAL at 20:33

## 2019-01-08 RX ADMIN — DICYCLOMINE HYDROCHLORIDE 20 MG: 10 CAPSULE ORAL at 10:01

## 2019-01-08 RX ADMIN — OXYCODONE HYDROCHLORIDE AND ACETAMINOPHEN 2 TABLET: 5; 325 TABLET ORAL at 13:02

## 2019-01-08 RX ADMIN — CEPHALEXIN 500 MG: 500 CAPSULE ORAL at 12:39

## 2019-01-08 RX ADMIN — PANTOPRAZOLE SODIUM 40 MG: 40 TABLET, DELAYED RELEASE ORAL at 10:02

## 2019-01-08 RX ADMIN — CEPHALEXIN 500 MG: 500 CAPSULE ORAL at 06:16

## 2019-01-08 RX ADMIN — OXYCODONE HYDROCHLORIDE AND ACETAMINOPHEN 2 TABLET: 5; 325 TABLET ORAL at 20:33

## 2019-01-08 RX ADMIN — FUROSEMIDE 20 MG: 20 TABLET ORAL at 10:02

## 2019-01-08 RX ADMIN — TIZANIDINE 2 MG: 2 TABLET ORAL at 21:57

## 2019-01-08 RX ADMIN — DOCUSATE SODIUM 100 MG: 100 TABLET, FILM COATED ORAL at 20:32

## 2019-01-08 RX ADMIN — Medication 10 ML: at 10:05

## 2019-01-08 RX ADMIN — LOSARTAN POTASSIUM 25 MG: 25 TABLET, FILM COATED ORAL at 10:02

## 2019-01-08 ASSESSMENT — ENCOUNTER SYMPTOMS
WHEEZING: 0
APNEA: 0
EYE REDNESS: 0
ABDOMINAL PAIN: 0
DIARRHEA: 0
SHORTNESS OF BREATH: 1
VOMITING: 0
COUGH: 0
SINUS PAIN: 0
NAUSEA: 1
BLOOD IN STOOL: 0
RHINORRHEA: 0
EYE PAIN: 0

## 2019-01-08 ASSESSMENT — PAIN DESCRIPTION - LOCATION
LOCATION: BACK

## 2019-01-08 ASSESSMENT — PAIN DESCRIPTION - PAIN TYPE
TYPE: ACUTE PAIN
TYPE: ACUTE PAIN
TYPE: ACUTE PAIN;CHRONIC PAIN

## 2019-01-08 ASSESSMENT — PAIN SCALES - GENERAL
PAINLEVEL_OUTOF10: 8
PAINLEVEL_OUTOF10: 8
PAINLEVEL_OUTOF10: 6
PAINLEVEL_OUTOF10: 9
PAINLEVEL_OUTOF10: 9

## 2019-01-08 ASSESSMENT — PAIN DESCRIPTION - ORIENTATION: ORIENTATION: LOWER

## 2019-01-08 ASSESSMENT — PAIN DESCRIPTION - DESCRIPTORS: DESCRIPTORS: SHARP

## 2019-01-09 VITALS
HEIGHT: 62 IN | SYSTOLIC BLOOD PRESSURE: 100 MMHG | HEART RATE: 65 BPM | TEMPERATURE: 97.6 F | WEIGHT: 251.1 LBS | DIASTOLIC BLOOD PRESSURE: 56 MMHG | BODY MASS INDEX: 46.21 KG/M2 | OXYGEN SATURATION: 93 % | RESPIRATION RATE: 20 BRPM

## 2019-01-09 LAB
CULTURE: ABNORMAL
ESTIMATED AVERAGE GLUCOSE: 151 MG/DL
GLUCOSE BLD-MCNC: 153 MG/DL (ref 65–105)
GLUCOSE BLD-MCNC: 317 MG/DL (ref 65–105)
HBA1C MFR BLD: 6.9 % (ref 4–6)
Lab: ABNORMAL
ORGANISM: ABNORMAL
SPECIMEN DESCRIPTION: ABNORMAL
STATUS: ABNORMAL

## 2019-01-09 PROCEDURE — 83036 HEMOGLOBIN GLYCOSYLATED A1C: CPT

## 2019-01-09 PROCEDURE — 97110 THERAPEUTIC EXERCISES: CPT

## 2019-01-09 PROCEDURE — 94660 CPAP INITIATION&MGMT: CPT

## 2019-01-09 PROCEDURE — 6360000002 HC RX W HCPCS: Performed by: EMERGENCY MEDICINE

## 2019-01-09 PROCEDURE — 6370000000 HC RX 637 (ALT 250 FOR IP): Performed by: EMERGENCY MEDICINE

## 2019-01-09 PROCEDURE — 36415 COLL VENOUS BLD VENIPUNCTURE: CPT

## 2019-01-09 PROCEDURE — 6370000000 HC RX 637 (ALT 250 FOR IP): Performed by: STUDENT IN AN ORGANIZED HEALTH CARE EDUCATION/TRAINING PROGRAM

## 2019-01-09 PROCEDURE — 82947 ASSAY GLUCOSE BLOOD QUANT: CPT

## 2019-01-09 PROCEDURE — 2580000003 HC RX 258: Performed by: EMERGENCY MEDICINE

## 2019-01-09 PROCEDURE — 97116 GAIT TRAINING THERAPY: CPT

## 2019-01-09 PROCEDURE — G0378 HOSPITAL OBSERVATION PER HR: HCPCS

## 2019-01-09 PROCEDURE — 97530 THERAPEUTIC ACTIVITIES: CPT

## 2019-01-09 PROCEDURE — 2700000000 HC OXYGEN THERAPY PER DAY

## 2019-01-09 RX ORDER — INSULIN GLARGINE 100 [IU]/ML
35 INJECTION, SOLUTION SUBCUTANEOUS NIGHTLY
Status: DISCONTINUED | OUTPATIENT
Start: 2019-01-09 | End: 2019-01-09 | Stop reason: HOSPADM

## 2019-01-09 RX ORDER — INSULIN GLARGINE 100 [IU]/ML
45 INJECTION, SOLUTION SUBCUTANEOUS DAILY
Status: DISCONTINUED | OUTPATIENT
Start: 2019-01-09 | End: 2019-01-09 | Stop reason: HOSPADM

## 2019-01-09 RX ORDER — DEXTROSE MONOHYDRATE 25 G/50ML
12.5 INJECTION, SOLUTION INTRAVENOUS PRN
Status: DISCONTINUED | OUTPATIENT
Start: 2019-01-09 | End: 2019-01-09 | Stop reason: HOSPADM

## 2019-01-09 RX ORDER — NICOTINE POLACRILEX 4 MG
15 LOZENGE BUCCAL PRN
Status: DISCONTINUED | OUTPATIENT
Start: 2019-01-09 | End: 2019-01-09 | Stop reason: HOSPADM

## 2019-01-09 RX ORDER — DEXTROSE MONOHYDRATE 50 MG/ML
100 INJECTION, SOLUTION INTRAVENOUS PRN
Status: DISCONTINUED | OUTPATIENT
Start: 2019-01-09 | End: 2019-01-09 | Stop reason: HOSPADM

## 2019-01-09 RX ORDER — CEPHALEXIN 500 MG/1
500 CAPSULE ORAL 2 TIMES DAILY
Qty: 8 CAPSULE | Refills: 0 | Status: SHIPPED | OUTPATIENT
Start: 2019-01-09 | End: 2019-01-13

## 2019-01-09 RX ADMIN — DOCUSATE SODIUM 100 MG: 100 TABLET, FILM COATED ORAL at 09:00

## 2019-01-09 RX ADMIN — OXYCODONE HYDROCHLORIDE AND ACETAMINOPHEN 2 TABLET: 5; 325 TABLET ORAL at 12:12

## 2019-01-09 RX ADMIN — PANTOPRAZOLE SODIUM 40 MG: 40 TABLET, DELAYED RELEASE ORAL at 09:00

## 2019-01-09 RX ADMIN — INSULIN GLARGINE 45 UNITS: 100 INJECTION, SOLUTION SUBCUTANEOUS at 09:00

## 2019-01-09 RX ADMIN — TIZANIDINE 2 MG: 2 TABLET ORAL at 09:20

## 2019-01-09 RX ADMIN — CEPHALEXIN 500 MG: 500 CAPSULE ORAL at 09:00

## 2019-01-09 RX ADMIN — Medication 10 ML: at 09:36

## 2019-01-09 RX ADMIN — DICYCLOMINE HYDROCHLORIDE 20 MG: 10 CAPSULE ORAL at 09:00

## 2019-01-09 RX ADMIN — Medication 1000 MCG: at 09:20

## 2019-01-09 RX ADMIN — CITALOPRAM HYDROBROMIDE 20 MG: 20 TABLET ORAL at 09:00

## 2019-01-09 RX ADMIN — CARVEDILOL 3.12 MG: 3.12 TABLET, FILM COATED ORAL at 09:00

## 2019-01-09 RX ADMIN — AMLODIPINE BESYLATE 5 MG: 5 TABLET ORAL at 09:00

## 2019-01-09 RX ADMIN — ONDANSETRON 4 MG: 4 TABLET, ORALLY DISINTEGRATING ORAL at 16:16

## 2019-01-09 RX ADMIN — ISOSORBIDE MONONITRATE 30 MG: 30 TABLET ORAL at 09:00

## 2019-01-09 ASSESSMENT — PAIN SCALES - GENERAL: PAINLEVEL_OUTOF10: 8

## 2019-01-10 ENCOUNTER — TELEPHONE (OUTPATIENT)
Dept: FAMILY MEDICINE CLINIC | Age: 69
End: 2019-01-10

## 2019-01-14 ENCOUNTER — HOSPITAL ENCOUNTER (OUTPATIENT)
Dept: PAIN MANAGEMENT | Age: 69
Discharge: HOME | End: 2019-01-14
Payer: COMMERCIAL

## 2019-01-14 DIAGNOSIS — Z51.81 MEDICATION MONITORING ENCOUNTER: Chronic | ICD-10-CM

## 2019-01-14 DIAGNOSIS — M54.40 CHRONIC BILATERAL LOW BACK PAIN WITH SCIATICA, SCIATICA LATERALITY UNSPECIFIED: ICD-10-CM

## 2019-01-14 DIAGNOSIS — M50.30 DDD (DEGENERATIVE DISC DISEASE), CERVICAL: Chronic | ICD-10-CM

## 2019-01-14 DIAGNOSIS — M54.16 LUMBAR RADICULOPATHY, CHRONIC: ICD-10-CM

## 2019-01-14 DIAGNOSIS — G89.29 CHRONIC BILATERAL LOW BACK PAIN WITH SCIATICA, SCIATICA LATERALITY UNSPECIFIED: ICD-10-CM

## 2019-01-14 DIAGNOSIS — M51.36 DDD (DEGENERATIVE DISC DISEASE), LUMBAR: Primary | ICD-10-CM

## 2019-01-14 PROCEDURE — 99213 OFFICE O/P EST LOW 20 MIN: CPT

## 2019-01-14 PROCEDURE — 99213 OFFICE O/P EST LOW 20 MIN: CPT | Performed by: NURSE PRACTITIONER

## 2019-01-14 RX ORDER — OXYCODONE HYDROCHLORIDE AND ACETAMINOPHEN 5; 325 MG/1; MG/1
1 TABLET ORAL SEE ADMIN INSTRUCTIONS
Qty: 100 TABLET | Refills: 0 | Status: SHIPPED | OUTPATIENT
Start: 2019-01-14 | End: 2019-02-14 | Stop reason: SDUPTHER

## 2019-01-14 RX ORDER — TOPIRAMATE 100 MG/1
100 TABLET, FILM COATED ORAL NIGHTLY
Qty: 90 TABLET | Refills: 0 | Status: SHIPPED | OUTPATIENT
Start: 2019-01-14 | End: 2019-07-16 | Stop reason: SDUPTHER

## 2019-01-14 ASSESSMENT — ENCOUNTER SYMPTOMS
SHORTNESS OF BREATH: 0
COUGH: 0
BACK PAIN: 1
CONSTIPATION: 0

## 2019-02-11 RX ORDER — CARVEDILOL 3.12 MG/1
TABLET ORAL
Qty: 60 TABLET | Refills: 0 | Status: SHIPPED | OUTPATIENT
Start: 2019-02-11 | End: 2019-03-18 | Stop reason: SDUPTHER

## 2019-02-11 RX ORDER — MAGNESIUM OXIDE 400 MG/1
TABLET ORAL
Qty: 60 TABLET | Refills: 0 | Status: SHIPPED | OUTPATIENT
Start: 2019-02-11 | End: 2019-03-18 | Stop reason: SDUPTHER

## 2019-02-11 RX ORDER — FERROUS SULFATE 325(65) MG
TABLET ORAL
Qty: 30 TABLET | Refills: 0 | Status: SHIPPED | OUTPATIENT
Start: 2019-02-11 | End: 2019-03-18 | Stop reason: SDUPTHER

## 2019-02-14 ENCOUNTER — HOSPITAL ENCOUNTER (OUTPATIENT)
Dept: PAIN MANAGEMENT | Age: 69
Discharge: HOME | End: 2019-02-14
Payer: COMMERCIAL

## 2019-02-14 VITALS
TEMPERATURE: 98 F | BODY MASS INDEX: 45.27 KG/M2 | HEIGHT: 62 IN | SYSTOLIC BLOOD PRESSURE: 114 MMHG | DIASTOLIC BLOOD PRESSURE: 54 MMHG | RESPIRATION RATE: 16 BRPM | HEART RATE: 71 BPM | WEIGHT: 246 LBS

## 2019-02-14 DIAGNOSIS — G89.29 CHRONIC BILATERAL LOW BACK PAIN WITH SCIATICA, SCIATICA LATERALITY UNSPECIFIED: ICD-10-CM

## 2019-02-14 DIAGNOSIS — G89.29 CHRONIC PAIN OF LEFT KNEE: ICD-10-CM

## 2019-02-14 DIAGNOSIS — M47.817 LUMBOSACRAL SPONDYLOSIS WITHOUT MYELOPATHY: ICD-10-CM

## 2019-02-14 DIAGNOSIS — G43.709 CHRONIC MIGRAINE WITHOUT AURA WITHOUT STATUS MIGRAINOSUS, NOT INTRACTABLE: ICD-10-CM

## 2019-02-14 DIAGNOSIS — G62.9 NEUROPATHY: ICD-10-CM

## 2019-02-14 DIAGNOSIS — M54.81 BILATERAL OCCIPITAL NEURALGIA: ICD-10-CM

## 2019-02-14 DIAGNOSIS — E66.01 OBESITY, MORBID, BMI 40.0-49.9 (HCC): ICD-10-CM

## 2019-02-14 DIAGNOSIS — G89.29 ENCOUNTER FOR CHRONIC PAIN MANAGEMENT: ICD-10-CM

## 2019-02-14 DIAGNOSIS — F11.90 CHRONIC, CONTINUOUS USE OF OPIOIDS: ICD-10-CM

## 2019-02-14 DIAGNOSIS — G43.009 NONINTRACTABLE MIGRAINE, UNSPECIFIED MIGRAINE TYPE: ICD-10-CM

## 2019-02-14 DIAGNOSIS — M51.36 DDD (DEGENERATIVE DISC DISEASE), LUMBAR: Primary | ICD-10-CM

## 2019-02-14 DIAGNOSIS — Z01.812 PRE-PROCEDURE LAB EXAM: ICD-10-CM

## 2019-02-14 DIAGNOSIS — M46.1 SACROILIITIS, NOT ELSEWHERE CLASSIFIED (HCC): ICD-10-CM

## 2019-02-14 DIAGNOSIS — M54.16 LUMBAR RADICULOPATHY, CHRONIC: ICD-10-CM

## 2019-02-14 DIAGNOSIS — M47.812 OSTEOARTHRITIS OF CERVICAL SPINE, UNSPECIFIED SPINAL OSTEOARTHRITIS COMPLICATION STATUS: ICD-10-CM

## 2019-02-14 DIAGNOSIS — M50.30 DEGENERATIVE CERVICAL DISC: ICD-10-CM

## 2019-02-14 DIAGNOSIS — M51.37 DEGENERATION OF LUMBAR OR LUMBOSACRAL INTERVERTEBRAL DISC: ICD-10-CM

## 2019-02-14 DIAGNOSIS — Z51.81 MEDICATION MONITORING ENCOUNTER: ICD-10-CM

## 2019-02-14 DIAGNOSIS — Z51.81 ENCOUNTER FOR MEDICATION MONITORING: ICD-10-CM

## 2019-02-14 DIAGNOSIS — M25.562 CHRONIC PAIN OF LEFT KNEE: ICD-10-CM

## 2019-02-14 DIAGNOSIS — M54.40 CHRONIC BILATERAL LOW BACK PAIN WITH SCIATICA, SCIATICA LATERALITY UNSPECIFIED: ICD-10-CM

## 2019-02-14 DIAGNOSIS — M50.30 DDD (DEGENERATIVE DISC DISEASE), CERVICAL: ICD-10-CM

## 2019-02-14 DIAGNOSIS — M47.892 OTHER OSTEOARTHRITIS OF SPINE, CERVICAL REGION: ICD-10-CM

## 2019-02-14 PROCEDURE — 99213 OFFICE O/P EST LOW 20 MIN: CPT

## 2019-02-14 PROCEDURE — 99213 OFFICE O/P EST LOW 20 MIN: CPT | Performed by: NURSE PRACTITIONER

## 2019-02-14 RX ORDER — OXYCODONE HYDROCHLORIDE AND ACETAMINOPHEN 5; 325 MG/1; MG/1
1 TABLET ORAL SEE ADMIN INSTRUCTIONS
Qty: 100 TABLET | Refills: 0 | Status: SHIPPED | OUTPATIENT
Start: 2019-02-14 | End: 2019-03-16

## 2019-02-14 ASSESSMENT — ENCOUNTER SYMPTOMS
NAUSEA: 1
BACK PAIN: 1
HEARTBURN: 1

## 2019-03-01 ENCOUNTER — TELEPHONE (OUTPATIENT)
Dept: PAIN MANAGEMENT | Age: 69
End: 2019-03-01

## 2019-03-04 ENCOUNTER — TELEPHONE (OUTPATIENT)
Dept: PAIN MANAGEMENT | Age: 69
End: 2019-03-04

## 2019-03-04 DIAGNOSIS — E11.42 TYPE 2 DIABETES MELLITUS WITH DIABETIC POLYNEUROPATHY, WITH LONG-TERM CURRENT USE OF INSULIN (HCC): Chronic | ICD-10-CM

## 2019-03-04 DIAGNOSIS — Z79.4 TYPE 2 DIABETES MELLITUS WITH DIABETIC POLYNEUROPATHY, WITH LONG-TERM CURRENT USE OF INSULIN (HCC): Chronic | ICD-10-CM

## 2019-03-04 RX ORDER — INSULIN GLARGINE 100 [IU]/ML
INJECTION, SOLUTION SUBCUTANEOUS
Qty: 30 ML | Refills: 0 | Status: SHIPPED | OUTPATIENT
Start: 2019-03-04 | End: 2019-08-07 | Stop reason: SDUPTHER

## 2019-03-07 ENCOUNTER — HOSPITAL ENCOUNTER (OUTPATIENT)
Age: 69
Discharge: HOME | End: 2019-03-07
Payer: COMMERCIAL

## 2019-03-07 ENCOUNTER — OFFICE VISIT (OUTPATIENT)
Dept: FAMILY MEDICINE CLINIC | Age: 69
End: 2019-03-07
Payer: COMMERCIAL

## 2019-03-07 VITALS
OXYGEN SATURATION: 94 % | DIASTOLIC BLOOD PRESSURE: 63 MMHG | WEIGHT: 253.6 LBS | HEIGHT: 62 IN | HEART RATE: 74 BPM | BODY MASS INDEX: 46.67 KG/M2 | SYSTOLIC BLOOD PRESSURE: 122 MMHG

## 2019-03-07 DIAGNOSIS — Z79.4 TYPE 2 DIABETES MELLITUS WITH DIABETIC POLYNEUROPATHY, WITH LONG-TERM CURRENT USE OF INSULIN (HCC): Chronic | ICD-10-CM

## 2019-03-07 DIAGNOSIS — I10 HTN (HYPERTENSION), BENIGN: Chronic | ICD-10-CM

## 2019-03-07 DIAGNOSIS — I50.9 CHRONIC CONGESTIVE HEART FAILURE, UNSPECIFIED HEART FAILURE TYPE (HCC): ICD-10-CM

## 2019-03-07 DIAGNOSIS — E11.42 TYPE 2 DIABETES MELLITUS WITH DIABETIC POLYNEUROPATHY, WITH LONG-TERM CURRENT USE OF INSULIN (HCC): Chronic | ICD-10-CM

## 2019-03-07 DIAGNOSIS — M47.817 LUMBOSACRAL SPONDYLOSIS WITHOUT MYELOPATHY: Primary | Chronic | ICD-10-CM

## 2019-03-07 DIAGNOSIS — Z23 NEED FOR PROPHYLACTIC VACCINATION AGAINST DIPHTHERIA-TETANUS-PERTUSSIS (DTP): ICD-10-CM

## 2019-03-07 DIAGNOSIS — R31.9 HEMATURIA, UNSPECIFIED TYPE: ICD-10-CM

## 2019-03-07 LAB
-: ABNORMAL
AMORPHOUS: ABNORMAL
BACTERIA: ABNORMAL
BILIRUBIN URINE: NEGATIVE
CASTS UA: ABNORMAL /LPF
COLOR: YELLOW
COMMENT UA: ABNORMAL
CRYSTALS, UA: ABNORMAL /HPF
EPITHELIAL CELLS UA: ABNORMAL /HPF
GLUCOSE URINE: NEGATIVE
KETONES, URINE: NEGATIVE
LEUKOCYTE ESTERASE, URINE: ABNORMAL
MUCUS: ABNORMAL
NITRITE, URINE: NEGATIVE
OTHER OBSERVATIONS UA: ABNORMAL
PH UA: 5.5 (ref 5–8)
PROTEIN UA: NEGATIVE
RBC UA: ABNORMAL /HPF
RENAL EPITHELIAL, UA: ABNORMAL /HPF
SPECIFIC GRAVITY UA: 1.02 (ref 1–1.03)
TRICHOMONAS: ABNORMAL
TURBIDITY: ABNORMAL
URINE HGB: NEGATIVE
UROBILINOGEN, URINE: NORMAL
WBC UA: ABNORMAL /HPF
YEAST: ABNORMAL

## 2019-03-07 PROCEDURE — 81001 URINALYSIS AUTO W/SCOPE: CPT

## 2019-03-07 PROCEDURE — 3017F COLORECTAL CA SCREEN DOC REV: CPT | Performed by: FAMILY MEDICINE

## 2019-03-07 PROCEDURE — G8482 FLU IMMUNIZE ORDER/ADMIN: HCPCS | Performed by: FAMILY MEDICINE

## 2019-03-07 PROCEDURE — G8598 ASA/ANTIPLAT THER USED: HCPCS | Performed by: FAMILY MEDICINE

## 2019-03-07 PROCEDURE — 1036F TOBACCO NON-USER: CPT | Performed by: FAMILY MEDICINE

## 2019-03-07 PROCEDURE — 4040F PNEUMOC VAC/ADMIN/RCVD: CPT | Performed by: FAMILY MEDICINE

## 2019-03-07 PROCEDURE — 3044F HG A1C LEVEL LT 7.0%: CPT | Performed by: FAMILY MEDICINE

## 2019-03-07 PROCEDURE — 1090F PRES/ABSN URINE INCON ASSESS: CPT | Performed by: FAMILY MEDICINE

## 2019-03-07 PROCEDURE — G8399 PT W/DXA RESULTS DOCUMENT: HCPCS | Performed by: FAMILY MEDICINE

## 2019-03-07 PROCEDURE — 1123F ACP DISCUSS/DSCN MKR DOCD: CPT | Performed by: FAMILY MEDICINE

## 2019-03-07 PROCEDURE — 99214 OFFICE O/P EST MOD 30 MIN: CPT | Performed by: FAMILY MEDICINE

## 2019-03-07 PROCEDURE — G8417 CALC BMI ABV UP PARAM F/U: HCPCS | Performed by: FAMILY MEDICINE

## 2019-03-07 PROCEDURE — 1101F PT FALLS ASSESS-DOCD LE1/YR: CPT | Performed by: FAMILY MEDICINE

## 2019-03-07 PROCEDURE — G8427 DOCREV CUR MEDS BY ELIG CLIN: HCPCS | Performed by: FAMILY MEDICINE

## 2019-03-07 PROCEDURE — 2022F DILAT RTA XM EVC RTNOPTHY: CPT | Performed by: FAMILY MEDICINE

## 2019-03-07 RX ORDER — LIDOCAINE 40 MG/G
CREAM TOPICAL
Qty: 45 G | Refills: 3 | Status: SHIPPED | OUTPATIENT
Start: 2019-03-07 | End: 2019-07-16

## 2019-03-07 RX ORDER — TIZANIDINE 2 MG/1
2 TABLET ORAL EVERY 12 HOURS PRN
Qty: 60 TABLET | Refills: 0 | Status: SHIPPED | OUTPATIENT
Start: 2019-03-07 | End: 2019-03-14

## 2019-03-07 ASSESSMENT — ENCOUNTER SYMPTOMS
ABDOMINAL DISTENTION: 0
RHINORRHEA: 0
SINUS PRESSURE: 0
WHEEZING: 0
SINUS PAIN: 0
PHOTOPHOBIA: 0
NAUSEA: 0
BLOOD IN STOOL: 0
CHEST TIGHTNESS: 0
BACK PAIN: 1
SHORTNESS OF BREATH: 0
CONSTIPATION: 0

## 2019-03-07 ASSESSMENT — PATIENT HEALTH QUESTIONNAIRE - PHQ9
1. LITTLE INTEREST OR PLEASURE IN DOING THINGS: 1
SUM OF ALL RESPONSES TO PHQ QUESTIONS 1-9: 2
2. FEELING DOWN, DEPRESSED OR HOPELESS: 1
SUM OF ALL RESPONSES TO PHQ9 QUESTIONS 1 & 2: 2
SUM OF ALL RESPONSES TO PHQ QUESTIONS 1-9: 2

## 2019-03-08 DIAGNOSIS — R31.9 HEMATURIA, UNSPECIFIED TYPE: Primary | ICD-10-CM

## 2019-03-14 ENCOUNTER — HOSPITAL ENCOUNTER (OUTPATIENT)
Age: 69
Setting detail: OBSERVATION
Discharge: HOME | End: 2019-03-15
Attending: EMERGENCY MEDICINE | Admitting: EMERGENCY MEDICINE
Payer: COMMERCIAL

## 2019-03-14 ENCOUNTER — TELEPHONE (OUTPATIENT)
Dept: FAMILY MEDICINE CLINIC | Age: 69
End: 2019-03-14

## 2019-03-14 ENCOUNTER — APPOINTMENT (OUTPATIENT)
Dept: GENERAL RADIOLOGY | Age: 69
End: 2019-03-14
Payer: COMMERCIAL

## 2019-03-14 ENCOUNTER — APPOINTMENT (OUTPATIENT)
Dept: CT IMAGING | Age: 69
End: 2019-03-14
Payer: COMMERCIAL

## 2019-03-14 DIAGNOSIS — R11.0 NAUSEA: ICD-10-CM

## 2019-03-14 DIAGNOSIS — R10.9 INTRACTABLE ABDOMINAL PAIN: Primary | ICD-10-CM

## 2019-03-14 DIAGNOSIS — K21.00 GASTROESOPHAGEAL REFLUX DISEASE WITH ESOPHAGITIS: ICD-10-CM

## 2019-03-14 LAB
-: ABNORMAL
ABSOLUTE EOS #: 0.14 K/UL (ref 0–0.44)
ABSOLUTE IMMATURE GRANULOCYTE: 0.03 K/UL (ref 0–0.3)
ABSOLUTE LYMPH #: 1.62 K/UL (ref 1.1–3.7)
ABSOLUTE MONO #: 0.62 K/UL (ref 0.1–1.2)
ALBUMIN SERPL-MCNC: 4.2 G/DL (ref 3.5–5.2)
ALBUMIN/GLOBULIN RATIO: 1.4 (ref 1–2.5)
ALP BLD-CCNC: 53 U/L (ref 35–104)
ALT SERPL-CCNC: 10 U/L (ref 5–33)
AMORPHOUS: ABNORMAL
ANION GAP SERPL CALCULATED.3IONS-SCNC: 10 MMOL/L (ref 9–17)
AST SERPL-CCNC: 12 U/L
BACTERIA: ABNORMAL
BASOPHILS # BLD: 1 % (ref 0–2)
BASOPHILS ABSOLUTE: 0.05 K/UL (ref 0–0.2)
BILIRUB SERPL-MCNC: 0.21 MG/DL (ref 0.3–1.2)
BILIRUBIN DIRECT: <0.08 MG/DL
BILIRUBIN URINE: NEGATIVE
BILIRUBIN, INDIRECT: ABNORMAL MG/DL (ref 0–1)
BUN BLDV-MCNC: 19 MG/DL (ref 8–23)
BUN/CREAT BLD: ABNORMAL (ref 9–20)
CALCIUM SERPL-MCNC: 8.9 MG/DL (ref 8.6–10.4)
CASTS UA: ABNORMAL /LPF (ref 0–8)
CHLORIDE BLD-SCNC: 101 MMOL/L (ref 98–107)
CO2: 26 MMOL/L (ref 20–31)
COLOR: YELLOW
CREAT SERPL-MCNC: 0.92 MG/DL (ref 0.5–0.9)
CRYSTALS, UA: ABNORMAL /HPF
DIFFERENTIAL TYPE: ABNORMAL
EOSINOPHILS RELATIVE PERCENT: 2 % (ref 1–4)
EPITHELIAL CELLS UA: ABNORMAL /HPF (ref 0–5)
GFR AFRICAN AMERICAN: >60 ML/MIN
GFR NON-AFRICAN AMERICAN: >60 ML/MIN
GFR SERPL CREATININE-BSD FRML MDRD: ABNORMAL ML/MIN/{1.73_M2}
GFR SERPL CREATININE-BSD FRML MDRD: ABNORMAL ML/MIN/{1.73_M2}
GLOBULIN: ABNORMAL G/DL (ref 1.5–3.8)
GLUCOSE BLD-MCNC: 167 MG/DL (ref 70–99)
GLUCOSE BLD-MCNC: 233 MG/DL (ref 65–105)
GLUCOSE URINE: NEGATIVE
HCT VFR BLD CALC: 39.6 % (ref 36.3–47.1)
HEMOGLOBIN: 12.6 G/DL (ref 11.9–15.1)
IMMATURE GRANULOCYTES: 1 %
KETONES, URINE: NEGATIVE
LEUKOCYTE ESTERASE, URINE: ABNORMAL
LIPASE: 43 U/L (ref 13–60)
LYMPHOCYTES # BLD: 25 % (ref 24–43)
MCH RBC QN AUTO: 30.2 PG (ref 25.2–33.5)
MCHC RBC AUTO-ENTMCNC: 31.8 G/DL (ref 28.4–34.8)
MCV RBC AUTO: 95 FL (ref 82.6–102.9)
MONOCYTES # BLD: 10 % (ref 3–12)
MUCUS: ABNORMAL
NITRITE, URINE: NEGATIVE
NRBC AUTOMATED: 0 PER 100 WBC
OTHER OBSERVATIONS UA: ABNORMAL
PDW BLD-RTO: 13.2 % (ref 11.8–14.4)
PH UA: 7.5 (ref 5–8)
PLATELET # BLD: 180 K/UL (ref 138–453)
PLATELET ESTIMATE: ABNORMAL
PMV BLD AUTO: 11.5 FL (ref 8.1–13.5)
POTASSIUM SERPL-SCNC: 4.4 MMOL/L (ref 3.7–5.3)
PROTEIN UA: NEGATIVE
RBC # BLD: 4.17 M/UL (ref 3.95–5.11)
RBC # BLD: ABNORMAL 10*6/UL
RBC UA: ABNORMAL /HPF (ref 0–4)
RENAL EPITHELIAL, UA: ABNORMAL /HPF
SEG NEUTROPHILS: 61 % (ref 36–65)
SEGMENTED NEUTROPHILS ABSOLUTE COUNT: 3.97 K/UL (ref 1.5–8.1)
SODIUM BLD-SCNC: 137 MMOL/L (ref 135–144)
SPECIFIC GRAVITY UA: 1.01 (ref 1–1.03)
TOTAL PROTEIN: 7.2 G/DL (ref 6.4–8.3)
TRICHOMONAS: ABNORMAL
TURBIDITY: CLEAR
URINE HGB: NEGATIVE
UROBILINOGEN, URINE: NORMAL
WBC # BLD: 6.4 K/UL (ref 3.5–11.3)
WBC # BLD: ABNORMAL 10*3/UL
WBC UA: ABNORMAL /HPF (ref 0–5)
YEAST: ABNORMAL

## 2019-03-14 PROCEDURE — 87086 URINE CULTURE/COLONY COUNT: CPT

## 2019-03-14 PROCEDURE — 96375 TX/PRO/DX INJ NEW DRUG ADDON: CPT

## 2019-03-14 PROCEDURE — 74176 CT ABD & PELVIS W/O CONTRAST: CPT

## 2019-03-14 PROCEDURE — 81001 URINALYSIS AUTO W/SCOPE: CPT

## 2019-03-14 PROCEDURE — 94660 CPAP INITIATION&MGMT: CPT

## 2019-03-14 PROCEDURE — 6370000000 HC RX 637 (ALT 250 FOR IP): Performed by: STUDENT IN AN ORGANIZED HEALTH CARE EDUCATION/TRAINING PROGRAM

## 2019-03-14 PROCEDURE — 6360000002 HC RX W HCPCS: Performed by: EMERGENCY MEDICINE

## 2019-03-14 PROCEDURE — 6370000000 HC RX 637 (ALT 250 FOR IP): Performed by: EMERGENCY MEDICINE

## 2019-03-14 PROCEDURE — 94640 AIRWAY INHALATION TREATMENT: CPT

## 2019-03-14 PROCEDURE — 85025 COMPLETE CBC W/AUTO DIFF WBC: CPT

## 2019-03-14 PROCEDURE — 80048 BASIC METABOLIC PNL TOTAL CA: CPT

## 2019-03-14 PROCEDURE — 99285 EMERGENCY DEPT VISIT HI MDM: CPT

## 2019-03-14 PROCEDURE — G0378 HOSPITAL OBSERVATION PER HR: HCPCS

## 2019-03-14 PROCEDURE — 96376 TX/PRO/DX INJ SAME DRUG ADON: CPT

## 2019-03-14 PROCEDURE — 82947 ASSAY GLUCOSE BLOOD QUANT: CPT

## 2019-03-14 PROCEDURE — 2580000003 HC RX 258: Performed by: EMERGENCY MEDICINE

## 2019-03-14 PROCEDURE — 71046 X-RAY EXAM CHEST 2 VIEWS: CPT

## 2019-03-14 PROCEDURE — 6360000002 HC RX W HCPCS: Performed by: STUDENT IN AN ORGANIZED HEALTH CARE EDUCATION/TRAINING PROGRAM

## 2019-03-14 PROCEDURE — 2700000000 HC OXYGEN THERAPY PER DAY

## 2019-03-14 PROCEDURE — 83690 ASSAY OF LIPASE: CPT

## 2019-03-14 PROCEDURE — 80076 HEPATIC FUNCTION PANEL: CPT

## 2019-03-14 PROCEDURE — 96374 THER/PROPH/DIAG INJ IV PUSH: CPT

## 2019-03-14 RX ORDER — INSULIN GLARGINE 100 [IU]/ML
35 INJECTION, SOLUTION SUBCUTANEOUS NIGHTLY
Status: DISCONTINUED | OUTPATIENT
Start: 2019-03-14 | End: 2019-03-15 | Stop reason: HOSPADM

## 2019-03-14 RX ORDER — IPRATROPIUM BROMIDE AND ALBUTEROL SULFATE 2.5; .5 MG/3ML; MG/3ML
3 SOLUTION RESPIRATORY (INHALATION) EVERY 4 HOURS
Status: DISCONTINUED | OUTPATIENT
Start: 2019-03-14 | End: 2019-03-15

## 2019-03-14 RX ORDER — LOSARTAN POTASSIUM 25 MG/1
25 TABLET ORAL DAILY
Status: DISCONTINUED | OUTPATIENT
Start: 2019-03-15 | End: 2019-03-15 | Stop reason: HOSPADM

## 2019-03-14 RX ORDER — ONDANSETRON 2 MG/ML
4 INJECTION INTRAMUSCULAR; INTRAVENOUS EVERY 8 HOURS PRN
Status: DISCONTINUED | OUTPATIENT
Start: 2019-03-14 | End: 2019-03-15 | Stop reason: HOSPADM

## 2019-03-14 RX ORDER — UREA 10 %
10 LOTION (ML) TOPICAL NIGHTLY PRN
Status: DISCONTINUED | OUTPATIENT
Start: 2019-03-14 | End: 2019-03-15 | Stop reason: HOSPADM

## 2019-03-14 RX ORDER — KETOROLAC TROMETHAMINE 15 MG/ML
15 INJECTION, SOLUTION INTRAMUSCULAR; INTRAVENOUS ONCE
Status: COMPLETED | OUTPATIENT
Start: 2019-03-14 | End: 2019-03-14

## 2019-03-14 RX ORDER — INSULIN GLARGINE 100 [IU]/ML
45 INJECTION, SOLUTION SUBCUTANEOUS DAILY
Status: DISCONTINUED | OUTPATIENT
Start: 2019-03-15 | End: 2019-03-15 | Stop reason: HOSPADM

## 2019-03-14 RX ORDER — CLOPIDOGREL BISULFATE 75 MG/1
75 TABLET ORAL DAILY
Status: DISCONTINUED | OUTPATIENT
Start: 2019-03-15 | End: 2019-03-15 | Stop reason: HOSPADM

## 2019-03-14 RX ORDER — SODIUM CHLORIDE 0.9 % (FLUSH) 0.9 %
10 SYRINGE (ML) INJECTION EVERY 12 HOURS SCHEDULED
Status: DISCONTINUED | OUTPATIENT
Start: 2019-03-14 | End: 2019-03-15 | Stop reason: HOSPADM

## 2019-03-14 RX ORDER — PANTOPRAZOLE SODIUM 40 MG/1
40 TABLET, DELAYED RELEASE ORAL ONCE
Status: COMPLETED | OUTPATIENT
Start: 2019-03-14 | End: 2019-03-14

## 2019-03-14 RX ORDER — ALBUTEROL SULFATE 90 UG/1
2 AEROSOL, METERED RESPIRATORY (INHALATION)
Status: DISCONTINUED | OUTPATIENT
Start: 2019-03-14 | End: 2019-03-14

## 2019-03-14 RX ORDER — CITALOPRAM 20 MG/1
20 TABLET ORAL 2 TIMES DAILY
Status: DISCONTINUED | OUTPATIENT
Start: 2019-03-14 | End: 2019-03-15 | Stop reason: HOSPADM

## 2019-03-14 RX ORDER — OXYCODONE HYDROCHLORIDE AND ACETAMINOPHEN 5; 325 MG/1; MG/1
1 TABLET ORAL ONCE
Status: COMPLETED | OUTPATIENT
Start: 2019-03-14 | End: 2019-03-14

## 2019-03-14 RX ORDER — ACETAMINOPHEN 325 MG/1
650 TABLET ORAL EVERY 4 HOURS PRN
Status: DISCONTINUED | OUTPATIENT
Start: 2019-03-14 | End: 2019-03-15 | Stop reason: HOSPADM

## 2019-03-14 RX ORDER — TOPIRAMATE 100 MG/1
100 TABLET, FILM COATED ORAL NIGHTLY
Status: DISCONTINUED | OUTPATIENT
Start: 2019-03-14 | End: 2019-03-15 | Stop reason: HOSPADM

## 2019-03-14 RX ORDER — ONDANSETRON 2 MG/ML
4 INJECTION INTRAMUSCULAR; INTRAVENOUS ONCE
Status: COMPLETED | OUTPATIENT
Start: 2019-03-14 | End: 2019-03-14

## 2019-03-14 RX ORDER — ALBUTEROL SULFATE 90 UG/1
2 AEROSOL, METERED RESPIRATORY (INHALATION)
Status: DISCONTINUED | OUTPATIENT
Start: 2019-03-14 | End: 2019-03-15 | Stop reason: HOSPADM

## 2019-03-14 RX ORDER — CARVEDILOL 3.12 MG/1
3.12 TABLET ORAL 2 TIMES DAILY WITH MEALS
Status: DISCONTINUED | OUTPATIENT
Start: 2019-03-14 | End: 2019-03-15 | Stop reason: HOSPADM

## 2019-03-14 RX ORDER — IPRATROPIUM BROMIDE AND ALBUTEROL SULFATE 2.5; .5 MG/3ML; MG/3ML
1 SOLUTION RESPIRATORY (INHALATION)
Status: DISCONTINUED | OUTPATIENT
Start: 2019-03-14 | End: 2019-03-14

## 2019-03-14 RX ORDER — OXYCODONE HYDROCHLORIDE AND ACETAMINOPHEN 5; 325 MG/1; MG/1
1 TABLET ORAL EVERY 4 HOURS PRN
Status: DISCONTINUED | OUTPATIENT
Start: 2019-03-14 | End: 2019-03-15 | Stop reason: HOSPADM

## 2019-03-14 RX ORDER — ISOSORBIDE MONONITRATE 30 MG/1
30 TABLET, EXTENDED RELEASE ORAL DAILY
Status: DISCONTINUED | OUTPATIENT
Start: 2019-03-15 | End: 2019-03-15 | Stop reason: HOSPADM

## 2019-03-14 RX ORDER — FAMOTIDINE 20 MG/1
40 TABLET, FILM COATED ORAL EVERY EVENING
Status: DISCONTINUED | OUTPATIENT
Start: 2019-03-15 | End: 2019-03-15 | Stop reason: HOSPADM

## 2019-03-14 RX ORDER — SODIUM CHLORIDE 0.9 % (FLUSH) 0.9 %
10 SYRINGE (ML) INJECTION PRN
Status: DISCONTINUED | OUTPATIENT
Start: 2019-03-14 | End: 2019-03-15 | Stop reason: HOSPADM

## 2019-03-14 RX ORDER — ALBUTEROL SULFATE 2.5 MG/3ML
5 SOLUTION RESPIRATORY (INHALATION)
Status: DISCONTINUED | OUTPATIENT
Start: 2019-03-14 | End: 2019-03-15

## 2019-03-14 RX ORDER — CALCIUM CARBONATE 200(500)MG
500 TABLET,CHEWABLE ORAL ONCE
Status: COMPLETED | OUTPATIENT
Start: 2019-03-14 | End: 2019-03-14

## 2019-03-14 RX ORDER — PANTOPRAZOLE SODIUM 40 MG/1
40 TABLET, DELAYED RELEASE ORAL 2 TIMES DAILY
Status: DISCONTINUED | OUTPATIENT
Start: 2019-03-14 | End: 2019-03-15 | Stop reason: HOSPADM

## 2019-03-14 RX ORDER — SUCRALFATE 1 G/1
1 TABLET ORAL ONCE
Status: COMPLETED | OUTPATIENT
Start: 2019-03-14 | End: 2019-03-14

## 2019-03-14 RX ORDER — AMLODIPINE BESYLATE 5 MG/1
5 TABLET ORAL DAILY
Status: DISCONTINUED | OUTPATIENT
Start: 2019-03-15 | End: 2019-03-15 | Stop reason: HOSPADM

## 2019-03-14 RX ORDER — ACETAMINOPHEN 500 MG
1000 TABLET ORAL ONCE
Status: COMPLETED | OUTPATIENT
Start: 2019-03-14 | End: 2019-03-14

## 2019-03-14 RX ADMIN — METFORMIN HYDROCHLORIDE 1000 MG: 500 TABLET ORAL at 22:39

## 2019-03-14 RX ADMIN — CITALOPRAM 20 MG: 20 TABLET, FILM COATED ORAL at 22:39

## 2019-03-14 RX ADMIN — ANTACID TABLETS 500 MG: 500 TABLET, CHEWABLE ORAL at 17:47

## 2019-03-14 RX ADMIN — PANTOPRAZOLE SODIUM 40 MG: 40 TABLET, DELAYED RELEASE ORAL at 17:47

## 2019-03-14 RX ADMIN — ALBUTEROL SULFATE 10 MG: 5 SOLUTION RESPIRATORY (INHALATION) at 16:14

## 2019-03-14 RX ADMIN — OXYCODONE HYDROCHLORIDE AND ACETAMINOPHEN 1 TABLET: 5; 325 TABLET ORAL at 22:39

## 2019-03-14 RX ADMIN — IPRATROPIUM BROMIDE 0.5 MG: 0.5 SOLUTION RESPIRATORY (INHALATION) at 16:15

## 2019-03-14 RX ADMIN — ONDANSETRON 4 MG: 2 INJECTION INTRAMUSCULAR; INTRAVENOUS at 17:47

## 2019-03-14 RX ADMIN — ACETAMINOPHEN 1000 MG: 500 TABLET ORAL at 17:47

## 2019-03-14 RX ADMIN — SUCRALFATE 1 G: 1 TABLET ORAL at 17:47

## 2019-03-14 RX ADMIN — KETOROLAC TROMETHAMINE 15 MG: 15 INJECTION, SOLUTION INTRAMUSCULAR; INTRAVENOUS at 16:18

## 2019-03-14 RX ADMIN — ONDANSETRON 4 MG: 2 INJECTION INTRAMUSCULAR; INTRAVENOUS at 22:39

## 2019-03-14 RX ADMIN — TOPIRAMATE 100 MG: 100 TABLET, FILM COATED ORAL at 22:39

## 2019-03-14 RX ADMIN — Medication 10 MG: at 23:37

## 2019-03-14 RX ADMIN — Medication 10 ML: at 22:40

## 2019-03-14 RX ADMIN — OXYCODONE HYDROCHLORIDE AND ACETAMINOPHEN 1 TABLET: 5; 325 TABLET ORAL at 19:24

## 2019-03-14 RX ADMIN — IPRATROPIUM BROMIDE AND ALBUTEROL SULFATE 3 ML: .5; 3 SOLUTION RESPIRATORY (INHALATION) at 23:15

## 2019-03-14 RX ADMIN — PANTOPRAZOLE SODIUM 40 MG: 40 TABLET, DELAYED RELEASE ORAL at 22:39

## 2019-03-14 RX ADMIN — INSULIN GLARGINE 35 UNITS: 100 INJECTION, SOLUTION SUBCUTANEOUS at 22:42

## 2019-03-14 ASSESSMENT — PAIN SCALES - GENERAL
PAINLEVEL_OUTOF10: 6
PAINLEVEL_OUTOF10: 4
PAINLEVEL_OUTOF10: 8
PAINLEVEL_OUTOF10: 8
PAINLEVEL_OUTOF10: 9
PAINLEVEL_OUTOF10: 6

## 2019-03-14 ASSESSMENT — PAIN DESCRIPTION - LOCATION
LOCATION: ABDOMEN
LOCATION: ABDOMEN

## 2019-03-14 ASSESSMENT — PAIN DESCRIPTION - PAIN TYPE: TYPE: ACUTE PAIN;CHRONIC PAIN

## 2019-03-14 ASSESSMENT — PAIN DESCRIPTION - ORIENTATION
ORIENTATION: LOWER;MID;LEFT;RIGHT
ORIENTATION: RIGHT;UPPER

## 2019-03-15 VITALS
WEIGHT: 258 LBS | HEART RATE: 70 BPM | DIASTOLIC BLOOD PRESSURE: 66 MMHG | RESPIRATION RATE: 25 BRPM | SYSTOLIC BLOOD PRESSURE: 127 MMHG | TEMPERATURE: 98.1 F | HEIGHT: 62 IN | OXYGEN SATURATION: 95 % | BODY MASS INDEX: 47.48 KG/M2

## 2019-03-15 LAB
CULTURE: NORMAL
GLUCOSE BLD-MCNC: 176 MG/DL (ref 65–105)
GLUCOSE BLD-MCNC: 180 MG/DL (ref 65–105)
Lab: NORMAL
SPECIMEN DESCRIPTION: NORMAL

## 2019-03-15 PROCEDURE — G0378 HOSPITAL OBSERVATION PER HR: HCPCS

## 2019-03-15 PROCEDURE — 6370000000 HC RX 637 (ALT 250 FOR IP): Performed by: EMERGENCY MEDICINE

## 2019-03-15 PROCEDURE — 94640 AIRWAY INHALATION TREATMENT: CPT

## 2019-03-15 PROCEDURE — 2580000003 HC RX 258: Performed by: EMERGENCY MEDICINE

## 2019-03-15 PROCEDURE — 94660 CPAP INITIATION&MGMT: CPT

## 2019-03-15 PROCEDURE — 6370000000 HC RX 637 (ALT 250 FOR IP): Performed by: STUDENT IN AN ORGANIZED HEALTH CARE EDUCATION/TRAINING PROGRAM

## 2019-03-15 PROCEDURE — 82947 ASSAY GLUCOSE BLOOD QUANT: CPT

## 2019-03-15 RX ORDER — ALBUTEROL SULFATE 2.5 MG/3ML
2.5 SOLUTION RESPIRATORY (INHALATION)
Status: DISCONTINUED | OUTPATIENT
Start: 2019-03-15 | End: 2019-03-15 | Stop reason: HOSPADM

## 2019-03-15 RX ORDER — NICOTINE POLACRILEX 4 MG
15 LOZENGE BUCCAL PRN
Status: DISCONTINUED | OUTPATIENT
Start: 2019-03-15 | End: 2019-03-15 | Stop reason: HOSPADM

## 2019-03-15 RX ORDER — PANTOPRAZOLE SODIUM 40 MG/1
40 TABLET, DELAYED RELEASE ORAL 2 TIMES DAILY
Qty: 60 TABLET | Refills: 3 | Status: SHIPPED | OUTPATIENT
Start: 2019-03-15 | End: 2021-11-24

## 2019-03-15 RX ORDER — IPRATROPIUM BROMIDE AND ALBUTEROL SULFATE 2.5; .5 MG/3ML; MG/3ML
3 SOLUTION RESPIRATORY (INHALATION) 2 TIMES DAILY
Status: DISCONTINUED | OUTPATIENT
Start: 2019-03-15 | End: 2019-03-15 | Stop reason: HOSPADM

## 2019-03-15 RX ORDER — DEXTROSE MONOHYDRATE 25 G/50ML
12.5 INJECTION, SOLUTION INTRAVENOUS PRN
Status: DISCONTINUED | OUTPATIENT
Start: 2019-03-15 | End: 2019-03-15 | Stop reason: HOSPADM

## 2019-03-15 RX ORDER — DEXTROSE MONOHYDRATE 50 MG/ML
100 INJECTION, SOLUTION INTRAVENOUS PRN
Status: DISCONTINUED | OUTPATIENT
Start: 2019-03-15 | End: 2019-03-15 | Stop reason: HOSPADM

## 2019-03-15 RX ORDER — CEPHALEXIN 500 MG/1
500 CAPSULE ORAL 4 TIMES DAILY
Qty: 28 CAPSULE | Refills: 0 | Status: SHIPPED | OUTPATIENT
Start: 2019-03-15 | End: 2019-03-22

## 2019-03-15 RX ORDER — PHENAZOPYRIDINE HYDROCHLORIDE 100 MG/1
200 TABLET, FILM COATED ORAL
Status: DISCONTINUED | OUTPATIENT
Start: 2019-03-15 | End: 2019-03-15 | Stop reason: HOSPADM

## 2019-03-15 RX ORDER — CEPHALEXIN 500 MG/1
500 CAPSULE ORAL 4 TIMES DAILY
Status: DISCONTINUED | OUTPATIENT
Start: 2019-03-15 | End: 2019-03-15 | Stop reason: HOSPADM

## 2019-03-15 RX ADMIN — IPRATROPIUM BROMIDE AND ALBUTEROL SULFATE 3 ML: .5; 3 SOLUTION RESPIRATORY (INHALATION) at 08:42

## 2019-03-15 RX ADMIN — OXYCODONE HYDROCHLORIDE AND ACETAMINOPHEN 1 TABLET: 5; 325 TABLET ORAL at 09:17

## 2019-03-15 RX ADMIN — IPRATROPIUM BROMIDE AND ALBUTEROL SULFATE 3 ML: .5; 3 SOLUTION RESPIRATORY (INHALATION) at 03:52

## 2019-03-15 RX ADMIN — CITALOPRAM 20 MG: 20 TABLET, FILM COATED ORAL at 09:02

## 2019-03-15 RX ADMIN — INSULIN GLARGINE 45 UNITS: 100 INJECTION, SOLUTION SUBCUTANEOUS at 09:03

## 2019-03-15 RX ADMIN — Medication 10 ML: at 09:04

## 2019-03-15 RX ADMIN — CARVEDILOL 3.12 MG: 3.12 TABLET, FILM COATED ORAL at 09:02

## 2019-03-15 RX ADMIN — METFORMIN HYDROCHLORIDE 1000 MG: 500 TABLET ORAL at 09:01

## 2019-03-15 RX ADMIN — LOSARTAN POTASSIUM 25 MG: 25 TABLET, FILM COATED ORAL at 09:02

## 2019-03-15 RX ADMIN — AMLODIPINE BESYLATE 5 MG: 5 TABLET ORAL at 09:03

## 2019-03-15 RX ADMIN — MOMETASONE FUROATE AND FORMOTEROL FUMARATE DIHYDRATE 2 PUFF: 100; 5 AEROSOL RESPIRATORY (INHALATION) at 08:42

## 2019-03-15 RX ADMIN — INSULIN LISPRO 1 UNITS: 100 INJECTION, SOLUTION INTRAVENOUS; SUBCUTANEOUS at 12:32

## 2019-03-15 RX ADMIN — PHENAZOPYRIDINE HYDROCHLORIDE 200 MG: 100 TABLET ORAL at 13:30

## 2019-03-15 RX ADMIN — ISOSORBIDE MONONITRATE 30 MG: 30 TABLET ORAL at 09:03

## 2019-03-15 RX ADMIN — INSULIN LISPRO 1 UNITS: 100 INJECTION, SOLUTION INTRAVENOUS; SUBCUTANEOUS at 09:03

## 2019-03-15 RX ADMIN — CEPHALEXIN 500 MG: 500 CAPSULE ORAL at 12:31

## 2019-03-15 RX ADMIN — PANTOPRAZOLE SODIUM 40 MG: 40 TABLET, DELAYED RELEASE ORAL at 09:03

## 2019-03-15 ASSESSMENT — ENCOUNTER SYMPTOMS
COUGH: 0
ABDOMINAL PAIN: 1
BACK PAIN: 0
DIARRHEA: 0
NAUSEA: 1
SHORTNESS OF BREATH: 0
CONSTIPATION: 0
VOMITING: 1
SORE THROAT: 0
WHEEZING: 1

## 2019-03-15 ASSESSMENT — PAIN SCALES - GENERAL: PAINLEVEL_OUTOF10: 6

## 2019-03-18 ENCOUNTER — TELEPHONE (OUTPATIENT)
Dept: FAMILY MEDICINE CLINIC | Age: 69
End: 2019-03-18

## 2019-03-18 ENCOUNTER — HOSPITAL ENCOUNTER (OUTPATIENT)
Age: 69
Discharge: HOME | End: 2019-03-18
Payer: COMMERCIAL

## 2019-03-18 DIAGNOSIS — Z01.812 PRE-PROCEDURE LAB EXAM: ICD-10-CM

## 2019-03-18 LAB
COLLAGEN ADENOSINE-5'-DIPHOSPHATE (ADP) TIME: 71 SEC (ref 67–112)
COLLAGEN EPINEPHRINE TIME: 99 SEC (ref 85–172)
PLATELET FUNCTION INTERP: NORMAL

## 2019-03-18 PROCEDURE — 36415 COLL VENOUS BLD VENIPUNCTURE: CPT

## 2019-03-18 PROCEDURE — 85576 BLOOD PLATELET AGGREGATION: CPT

## 2019-03-18 RX ORDER — FERROUS SULFATE 325(65) MG
TABLET ORAL
Qty: 30 TABLET | Refills: 0 | Status: SHIPPED | OUTPATIENT
Start: 2019-03-18 | End: 2019-04-17 | Stop reason: SDUPTHER

## 2019-03-18 RX ORDER — CARVEDILOL 3.12 MG/1
TABLET ORAL
Qty: 60 TABLET | Refills: 0 | Status: SHIPPED | OUTPATIENT
Start: 2019-03-18 | End: 2019-04-17 | Stop reason: SDUPTHER

## 2019-03-18 RX ORDER — MAGNESIUM OXIDE 400 MG/1
TABLET ORAL
Qty: 60 TABLET | Refills: 0 | Status: SHIPPED | OUTPATIENT
Start: 2019-03-18 | End: 2019-04-17 | Stop reason: SDUPTHER

## 2019-03-19 ENCOUNTER — HOSPITAL ENCOUNTER (OUTPATIENT)
Dept: PAIN MANAGEMENT | Age: 69
Discharge: HOME | End: 2019-03-19
Payer: COMMERCIAL

## 2019-03-19 ENCOUNTER — HOSPITAL ENCOUNTER (OUTPATIENT)
Dept: GENERAL RADIOLOGY | Age: 69
Discharge: HOME | End: 2019-03-21
Payer: COMMERCIAL

## 2019-03-19 VITALS
RESPIRATION RATE: 16 BRPM | HEIGHT: 62 IN | TEMPERATURE: 97.8 F | SYSTOLIC BLOOD PRESSURE: 139 MMHG | HEART RATE: 88 BPM | WEIGHT: 258 LBS | DIASTOLIC BLOOD PRESSURE: 56 MMHG | BODY MASS INDEX: 47.48 KG/M2 | OXYGEN SATURATION: 96 %

## 2019-03-19 DIAGNOSIS — M50.30 DDD (DEGENERATIVE DISC DISEASE), CERVICAL: Chronic | ICD-10-CM

## 2019-03-19 DIAGNOSIS — M46.1 SACROILIITIS, NOT ELSEWHERE CLASSIFIED (HCC): Chronic | ICD-10-CM

## 2019-03-19 DIAGNOSIS — M54.16 LUMBAR RADICULOPATHY, CHRONIC: ICD-10-CM

## 2019-03-19 DIAGNOSIS — M47.817 LUMBOSACRAL SPONDYLOSIS WITHOUT MYELOPATHY: ICD-10-CM

## 2019-03-19 DIAGNOSIS — M51.36 DDD (DEGENERATIVE DISC DISEASE), LUMBAR: ICD-10-CM

## 2019-03-19 DIAGNOSIS — M54.16 LUMBAR RADICULOPATHY, CHRONIC: Primary | ICD-10-CM

## 2019-03-19 PROCEDURE — 3209999900 FLUORO FOR SURGICAL PROCEDURES

## 2019-03-19 PROCEDURE — 62323 NJX INTERLAMINAR LMBR/SAC: CPT | Performed by: PAIN MEDICINE

## 2019-03-19 PROCEDURE — 6360000002 HC RX W HCPCS: Performed by: PAIN MEDICINE

## 2019-03-19 PROCEDURE — 62327 NJX INTERLAMINAR LMBR/SAC: CPT

## 2019-03-19 RX ORDER — TRIAMCINOLONE ACETONIDE 40 MG/ML
INJECTION, SUSPENSION INTRA-ARTICULAR; INTRAMUSCULAR
Status: COMPLETED | OUTPATIENT
Start: 2019-03-19 | End: 2019-03-19

## 2019-03-19 RX ORDER — OXYCODONE HYDROCHLORIDE AND ACETAMINOPHEN 5; 325 MG/1; MG/1
1 TABLET ORAL EVERY 6 HOURS PRN
Qty: 100 TABLET | Refills: 0 | Status: SHIPPED | OUTPATIENT
Start: 2019-03-19 | End: 2019-04-15 | Stop reason: SDUPTHER

## 2019-03-19 RX ADMIN — TRIAMCINOLONE ACETONIDE 80 MG: 40 INJECTION, SUSPENSION INTRA-ARTICULAR; INTRAMUSCULAR at 09:50

## 2019-03-19 ASSESSMENT — PAIN DESCRIPTION - ORIENTATION: ORIENTATION: LEFT

## 2019-03-19 ASSESSMENT — ACTIVITIES OF DAILY LIVING (ADL): EFFECT OF PAIN ON DAILY ACTIVITIES: PAINFUL WHEN WALKING

## 2019-03-19 ASSESSMENT — PAIN DESCRIPTION - FREQUENCY: FREQUENCY: CONTINUOUS

## 2019-03-19 ASSESSMENT — PAIN DESCRIPTION - PAIN TYPE: TYPE: ACUTE PAIN

## 2019-03-19 ASSESSMENT — PAIN DESCRIPTION - LOCATION: LOCATION: ABDOMEN;BACK

## 2019-03-19 ASSESSMENT — PAIN DESCRIPTION - ONSET: ONSET: ON-GOING

## 2019-03-19 ASSESSMENT — PAIN SCALES - GENERAL: PAINLEVEL_OUTOF10: 6

## 2019-03-19 ASSESSMENT — PAIN DESCRIPTION - DESCRIPTORS: DESCRIPTORS: ACHING;CONSTANT;JABBING;NAGGING;SHARP

## 2019-03-19 ASSESSMENT — PAIN - FUNCTIONAL ASSESSMENT
PAIN_FUNCTIONAL_ASSESSMENT: 0-10
PAIN_FUNCTIONAL_ASSESSMENT: PREVENTS OR INTERFERES SOME ACTIVE ACTIVITIES AND ADLS

## 2019-03-19 ASSESSMENT — PAIN DESCRIPTION - PROGRESSION: CLINICAL_PROGRESSION: NOT CHANGED

## 2019-03-20 ENCOUNTER — TELEPHONE (OUTPATIENT)
Dept: PAIN MANAGEMENT | Age: 69
End: 2019-03-20

## 2019-03-28 ENCOUNTER — TELEPHONE (OUTPATIENT)
Dept: FAMILY MEDICINE CLINIC | Age: 69
End: 2019-03-28

## 2019-03-29 NOTE — TELEPHONE ENCOUNTER
Please inform pt about the use of symbicort , she needs to have 2 puffs BID every day. If she does not know how to use, we can arrange  pharmacist meeting with her.

## 2019-04-02 ENCOUNTER — TELEPHONE (OUTPATIENT)
Dept: FAMILY MEDICINE CLINIC | Age: 69
End: 2019-04-02

## 2019-04-02 DIAGNOSIS — F41.9 ANXIETY AND DEPRESSION: ICD-10-CM

## 2019-04-02 DIAGNOSIS — F32.A ANXIETY AND DEPRESSION: ICD-10-CM

## 2019-04-02 RX ORDER — CITALOPRAM 40 MG/1
TABLET ORAL
Qty: 90 TABLET | Refills: 3 | Status: SHIPPED | OUTPATIENT
Start: 2019-04-02 | End: 2021-01-15

## 2019-04-11 RX ORDER — TIZANIDINE 2 MG/1
2 TABLET ORAL EVERY 12 HOURS PRN
Qty: 60 TABLET | Refills: 0 | Status: SHIPPED | OUTPATIENT
Start: 2019-04-11 | End: 2019-04-14

## 2019-04-14 ENCOUNTER — HOSPITAL ENCOUNTER (EMERGENCY)
Age: 69
Discharge: HOME | End: 2019-04-14
Attending: EMERGENCY MEDICINE
Payer: COMMERCIAL

## 2019-04-14 VITALS
OXYGEN SATURATION: 92 % | BODY MASS INDEX: 45.27 KG/M2 | RESPIRATION RATE: 18 BRPM | WEIGHT: 246 LBS | SYSTOLIC BLOOD PRESSURE: 159 MMHG | HEART RATE: 74 BPM | DIASTOLIC BLOOD PRESSURE: 77 MMHG | TEMPERATURE: 98.2 F | HEIGHT: 62 IN

## 2019-04-14 DIAGNOSIS — G60.9 IDIOPATHIC PERIPHERAL NEUROPATHY: ICD-10-CM

## 2019-04-14 DIAGNOSIS — M48.061 LUMBAR FORAMINAL STENOSIS: Primary | ICD-10-CM

## 2019-04-14 PROCEDURE — 6360000002 HC RX W HCPCS: Performed by: EMERGENCY MEDICINE

## 2019-04-14 PROCEDURE — 96372 THER/PROPH/DIAG INJ SC/IM: CPT

## 2019-04-14 PROCEDURE — 99283 EMERGENCY DEPT VISIT LOW MDM: CPT

## 2019-04-14 PROCEDURE — 6370000000 HC RX 637 (ALT 250 FOR IP): Performed by: EMERGENCY MEDICINE

## 2019-04-14 RX ORDER — IBUPROFEN 800 MG/1
800 TABLET ORAL EVERY 8 HOURS PRN
Qty: 45 TABLET | Refills: 0 | Status: SHIPPED | OUTPATIENT
Start: 2019-04-14 | End: 2019-10-23 | Stop reason: SDUPTHER

## 2019-04-14 RX ORDER — CYCLOBENZAPRINE HCL 10 MG
10 TABLET ORAL ONCE
Status: COMPLETED | OUTPATIENT
Start: 2019-04-14 | End: 2019-04-14

## 2019-04-14 RX ORDER — CYCLOBENZAPRINE HCL 10 MG
10 TABLET ORAL 2 TIMES DAILY PRN
Qty: 30 TABLET | Refills: 0 | Status: SHIPPED | OUTPATIENT
Start: 2019-04-14 | End: 2019-04-24

## 2019-04-14 RX ORDER — KETOROLAC TROMETHAMINE 30 MG/ML
30 INJECTION, SOLUTION INTRAMUSCULAR; INTRAVENOUS ONCE
Status: COMPLETED | OUTPATIENT
Start: 2019-04-14 | End: 2019-04-14

## 2019-04-14 RX ORDER — OXYCODONE HYDROCHLORIDE AND ACETAMINOPHEN 5; 325 MG/1; MG/1
2 TABLET ORAL ONCE
Status: COMPLETED | OUTPATIENT
Start: 2019-04-14 | End: 2019-04-14

## 2019-04-14 RX ADMIN — CYCLOBENZAPRINE HYDROCHLORIDE 10 MG: 10 TABLET, FILM COATED ORAL at 15:49

## 2019-04-14 RX ADMIN — OXYCODONE HYDROCHLORIDE AND ACETAMINOPHEN 2 TABLET: 5; 325 TABLET ORAL at 15:49

## 2019-04-14 RX ADMIN — KETOROLAC TROMETHAMINE 30 MG: 30 INJECTION, SOLUTION INTRAMUSCULAR at 15:49

## 2019-04-14 ASSESSMENT — PAIN DESCRIPTION - DESCRIPTORS: DESCRIPTORS: CRAMPING

## 2019-04-14 ASSESSMENT — ENCOUNTER SYMPTOMS
SHORTNESS OF BREATH: 0
BLOOD IN STOOL: 0
SORE THROAT: 0
EYE DISCHARGE: 0
VOMITING: 0
CHEST TIGHTNESS: 0
EYE REDNESS: 0
COUGH: 0
NAUSEA: 0
DIARRHEA: 0
BACK PAIN: 1
ABDOMINAL PAIN: 0
RHINORRHEA: 0

## 2019-04-14 ASSESSMENT — PAIN DESCRIPTION - ONSET: ONSET: ON-GOING

## 2019-04-14 ASSESSMENT — PAIN DESCRIPTION - PAIN TYPE: TYPE: ACUTE PAIN

## 2019-04-14 ASSESSMENT — PAIN DESCRIPTION - FREQUENCY: FREQUENCY: CONTINUOUS

## 2019-04-14 ASSESSMENT — PAIN DESCRIPTION - ORIENTATION: ORIENTATION: RIGHT

## 2019-04-14 ASSESSMENT — PAIN DESCRIPTION - PROGRESSION: CLINICAL_PROGRESSION: NOT CHANGED

## 2019-04-14 ASSESSMENT — PAIN DESCRIPTION - LOCATION: LOCATION: LEG

## 2019-04-14 ASSESSMENT — PAIN SCALES - GENERAL
PAINLEVEL_OUTOF10: 10
PAINLEVEL_OUTOF10: 10

## 2019-04-14 NOTE — ED NOTES
Pt requesting to speak with resident, Dr. Didier Davalos notified.       Howard Nguyen RN  04/14/19 [id number]

## 2019-04-14 NOTE — ED PROVIDER NOTES
Hearing loss, Hematuria, Hiatal hernia, HTN (hypertension), benign, Hypertriglyceridemia, Incontinence of urine, Knee arthropathy, Lumbosacral spondylosis without myelopathy, Migraine headache, Mumps, Neuropathy, Obesity, On home oxygen therapy, HIGINIO on CPAP, Otitis media, Stroke (HCC), Tinnitus, Type 2 diabetes mellitus without complication ([facility] [address] Type II or unspecified type diabetes mellitus without mention of complication, not stated as uncontrolled, UTI (lower urinary tract infection), and Varicose vein. has a past surgical history that includes Cholecystectomy (); Carpal tunnel release (Right); Tympanoplasty; Dilation & curettage (); Cystocopy (2013); Cataract removal with implant (Bilateral); Tonsillectomy; Incontinence surgery; ablation of dysrhythmic focus (); Upper gastrointestinal endoscopy (2016); eye surgery; Tubal ligation; other surgical history (09/10/15); Insertable Cardiac Monitor (2015); Tympanoplasty; Colonoscopy; Colonoscopy (04/10/2017); pr colsc flx w/removal lesion by hot bx forceps (N/A, 4/10/2017); and Tympanostomy tube placement (2017).     Social History     Socioeconomic History    Marital status: Legally      Spouse name: Not on file    Number of children: Not on file    Years of education: Not on file    Highest education level: Not on file   Occupational History    Occupation: disabled     Employer: N/A   Social Needs    Financial resource strain: Not on file    Food insecurity:     Worry: Not on file     Inability: Not on file    Transportation needs:     Medical: Not on file     Non-medical: Not on file   Tobacco Use    Smoking status: Former Smoker     Packs/day: 3.00     Years: 41.00     Pack years: 123.00     Types: Cigarettes     Last attempt to quit: 2000     Years since quittin.2    Smokeless tobacco: Never Used    Tobacco comment: quit in    Substance and Sexual Activity    Alcohol use: No     Alcohol/week: 0.0 oz    Drug use: No    Sexual activity: Not Currently   Lifestyle    Physical activity:     Days per week: Not on file     Minutes per session: Not on file    Stress: Not on file   Relationships    Social connections:     Talks on phone: Not on file     Gets together: Not on file     Attends [employer] service: Not on file     Active member of club or organization: Not on file     Attends meetings of clubs or organizations: Not on file     Relationship status: Not on file    Intimate partner violence:     Fear of current or ex partner: Not on file     Emotionally abused: Not on file     Physically abused: Not on file     Forced sexual activity: Not on file   Other Topics Concern    Not on file   Social History Narrative    Not on file       Family History   Problem Relation Age of Onset    Diabetes Mother     Heart Disease Mother     Stomach Cancer Father     Diabetes Maternal Grandmother         also aunts and uncles (maternal)    Emphysema Sister        Allergies:  Robitussin [guaifenesin]; Codeine; Compazine [prochlorperazine maleate]; Iodides; Morphine; Moxifloxacin; Reglan [metoclopramide]; Avelox [moxifloxacin hcl in nacl]; Cipro xr; and Sulfa antibiotics    Home Medications:  Prior to Admission medications    Medication Sig Start Date End Date Taking? Authorizing Provider   ibuprofen (IBU) 800 MG tablet Take 1 tablet by mouth every 8 hours as needed for Pain 4/14/19  Yes Jatinder Gupta, DO   cyclobenzaprine (FLEXERIL) 10 MG tablet Take 1 tablet by mouth 2 times daily as needed for Muscle spasms 4/14/19 4/24/19 Yes John Cordella Sandhoff, DO   citalopram (CELEXA) 40 MG tablet TAKE 1/2  TAB BY MOUTH TWICE  A DAY 4/2/19   Roddy Grant MD   oxyCODONE-acetaminophen (PERCOCET) 5-325 MG per tablet Take 1 tablet by mouth every 6 hours as needed for Pain for up to 30 days. Intended supply: 30 days. Take lowest dose possible to manage pain max.  100/month 3/19/19 4/18/19  Lindsey Diego MD magnesium oxide (MAG-OX) 400 MG tablet TAKE 1 TAB BY MOUTH TWICE A DAY ( AM AND BEDTIME ) 3/18/19   Satya Joseph MD   ferrous sulfate 325 (65 Fe) MG tablet TAKE 1 TAB BY MOUTH IN THE MORNING 3/18/19   Satya Joseph MD   carvedilol (COREG) 3.125 MG tablet TAKE 1 TAB BY MOUTH TWICE A DAY ( AM AND BEDTIME ) 3/18/19   Satya Joseph MD   pantoprazole (PROTONIX) 40 MG tablet Take 1 tablet by mouth 2 times daily 3/15/19   Jagruti Oz, DO   lidocaine (LMX) 4 % cream Apply topically every 8 hrs as needed for pain 3/7/19   Satya Joseph MD   LANTUS SOLOSTAR 100 UNIT/ML injection pen INJECT 45 UNITS IN THE MORNING ,AND 35 UNITS NIGHTLY 3/4/19   Satya Joseph MD   topiramate (TOPAMAX) 100 MG tablet Take 1 tablet by mouth nightly 1/14/19   VERONIKA Morin - CNP   TRUE METRIX BLOOD GLUCOSE TEST strip THREE TIMES A DAY 12/13/18   Satya Joseph MD   Alcohol Swabs (B-D SINGLE USE SWABS REGULAR) PADS THREE TIMES A DAY 12/12/18   Satya Joseph MD   ULTICARE MINI PEN NEEDLES 31G X 6 MM MISC USE AS DIRECTED 12/12/18   Satya Joseph MD   ondansetron (ZOFRAN ODT) 4 MG disintegrating tablet Take 1 tablet by mouth every 8 hours as needed for Nausea 12/11/18   Luis Enrique Graf MD   nystatin (MYCOSTATIN) 664889 UNIT/GM powder Apply 3 times daily. 12/10/18   Satya Joseph MD   furosemide (LASIX) 20 MG tablet Take 1 tablet by mouth daily as needed (weight gain 3 lbs overnight) 11/16/18   Grey Fu MD   insulin aspart (NOVOLOG FLEXPEN) 100 UNIT/ML injection pen If <139 - No Insulin; 140-199-2 Units;200-249-4 Units;250-299-6 Units;300-349-8 Units;350-400=10 Units; Above 400-12 Units 11/16/18   Grey Fu MD   losartan (COZAAR) 25 MG tablet TAKE 1 TAB BY MOUTH ONCE A DAY 11/6/18   Satya Joseph MD   Lift Chair MISC by Does not apply route Use daily at home to help with ADL's 11/6/18   Satya Joseph MD   clopidogrel (PLAVIX) 75 MG tablet TAKE 1 TAB BY MOUTH ONCE A DAY 10/22/18   Satya Joseph MD dicyclomine (BENTYL) 20 MG tablet Take 1 tablet by mouth every 6 hours 10/8/18   Halima De MD   vitamin B-12 (CYANOCOBALAMIN) 1000 MCG tablet Take 1,000 mcg by mouth daily    Historical Provider, MD   nitroGLYCERIN (NITROSTAT) 0.4 MG SL tablet 1 under the tongue as needed for angina, may repeat q5mins for up three doses 8/28/18   Historical Provider, MD   zoster recombinant adjuvanted vaccine (SHINGRIX) 50 MCG SUSR injection 50 MCG IM then repeat 2-6 months.  8/7/18 8/7/19  Halima De MD   ranitidine (ZANTAC) 150 MG tablet TAKE 1 TAB BY MOUTH TWICE A DAY 6/8/18   Halima De MD   Blood Glucose Monitoring Suppl HARMEET Use daily to check BS 3/27/18   Halima De MD   metFORMIN (GLUCOPHAGE) 1000 MG tablet TAKE 1 TAB BY MOUTH TWICE DAILY 3/21/18   Halima De MD   Calcium Carb-Cholecalciferol (OYSCO D) 250-125 MG-UNIT TABS Take 1 tablet by mouth daily 2/14/18   Halima De MD   ipratropium-albuterol (DUONEB) 0.5-2.5 (3) MG/3ML SOLN nebulizer solution Inhale 3 mLs into the lungs every 4 hours 2/6/18   Jalen Grullon MD   Melatonin 10 MG TABS Take 10 mg by mouth nightly 10/19/17   Halima De MD   Compression Stockings MISC by Does not apply route Pressure between 20 - 25 . 9/11/17   Halima De MD   amLODIPine (NORVASC) 5 MG tablet TAKE 1 TAB BY MOUTH ONCE A DAY 6/29/17   Isabel Klinefelter, MD   isosorbide mononitrate (IMDUR) 30 MG extended release tablet TAKE 1 TABLET BY MOUTH DAILY 9/30/16   Isabel Klinefelter, MD   ULTICARE SHORT PEN NEEDLES 31G X 8 MM MISC AS DIRECTED 2/5/16   VERONIKA Young CNP   docusate sodium (COLACE) 100 MG capsule Take 1 capsule by mouth 2 times daily Please use while taking Percocet 9/10/15   Laura Colin MD   SYMBICORT 160-4.5 MCG/ACT AERO   2 puffs As needed for sob 5/28/15   Historical Provider, MD   OXYGEN Inhale 3 L into the lungs     Historical Provider, MD       REVIEW OF SYSTEMS    (2-9 systems for level 4, 10 or more for level 5)      Review of Systems   Constitutional: Negative for chills and fever. HENT: Negative for congestion, rhinorrhea and sore throat. Eyes: Negative for discharge and redness. Respiratory: Negative for cough, chest tightness and shortness of breath. Cardiovascular: Negative for chest pain. Gastrointestinal: Negative for abdominal pain, blood in stool, diarrhea, nausea and vomiting. Endocrine: Negative for polydipsia and polyuria. Genitourinary: Negative for dysuria and hematuria. Musculoskeletal: Positive for back pain. Negative for neck pain and neck stiffness. Right-sided right lower extremity peripheral neuropathy   Skin: Negative for rash and wound. Allergic/Immunologic: Negative for immunocompromised state. Neurological: Negative for weakness, numbness and headaches. Hematological: Negative for adenopathy. Psychiatric/Behavioral: Negative for agitation and behavioral problems. PHYSICAL EXAM   (up to 7 for level 4, 8 or more for level 5)      INITIAL VITALS:   BP (!) 159/77   Pulse 74   Temp 98.2 °F (36.8 °C) (Oral)   Resp 18   Ht 5' 2\" (1.575 m)   Wt 246 lb (111.6 kg)   LMP  (LMP Unknown)   SpO2 92%   BMI 44.99 kg/m²     Physical Exam   Constitutional: She is oriented to person, place, and time. She appears well-developed and well-nourished. No distress. Appears slightly uncomfortable due to the chronic back pain/right lower extremity pain however nontoxic, normal vitals, afebrile   HENT:   Head: Normocephalic and atraumatic. Eyes: Pupils are equal, round, and reactive to light. Conjunctivae and EOM are normal.   No nystagmus or disconjugate gaze   Neck: Normal range of motion. Neck supple. No JVD present. No tracheal deviation present. No midline cervical tenderness   Cardiovascular: Normal rate, regular rhythm, normal heart sounds and intact distal pulses. Pulmonary/Chest: Effort normal and breath sounds normal. No stridor. No respiratory distress. She has no wheezes.  She has no rales. She exhibits no tenderness. Abdominal: Soft. Bowel sounds are normal. She exhibits no distension. There is no tenderness. There is no rebound and no guarding. Abdomen soft, nondistended, nontender, non-peritoneal, no rebound tenderness or guarding, normal bowel sounds     Musculoskeletal: Normal range of motion. She exhibits no edema. Neurological: She is alert and oriented to person, place, and time. No cranial nerve deficit. No focal neurological deficits noted, moving all extremities, answering all questions appropriately, no change in sensation to bilateral upper and lower extremities, normal 5/5 motor strength to left lower extremity, decreased strength right lower extremity due to pain, positive straight leg raise, no loss of bowel or bladder, no nystagmus or disconjugate gaze     Skin: Skin is warm and dry. Capillary refill takes less than 2 seconds. No rash noted. Psychiatric: She has a normal mood and affect. DIFFERENTIAL  DIAGNOSIS     PLAN (LABS / IMAGING / EKG):  No orders of the defined types were placed in this encounter. MEDICATIONS ORDERED:  Orders Placed This Encounter   Medications    oxyCODONE-acetaminophen (PERCOCET) 5-325 MG per tablet 2 tablet    ketorolac (TORADOL) injection 30 mg    cyclobenzaprine (FLEXERIL) tablet 10 mg    ibuprofen (IBU) 800 MG tablet     Sig: Take 1 tablet by mouth every 8 hours as needed for Pain     Dispense:  45 tablet     Refill:  0    cyclobenzaprine (FLEXERIL) 10 MG tablet     Sig: Take 1 tablet by mouth 2 times daily as needed for Muscle spasms     Dispense:  30 tablet     Refill:  0       DDX: Acute on chronic lumbar back pain, bulging discs, nerve compression, peripheral neuropathy    DIAGNOSTIC RESULTS / EMERGENCY DEPARTMENT COURSE / MDM     LABS:  No results found for this visit on 04/14/19.     IMPRESSION/EMERGENCY DEPARTMENT COURSE:        ED Course as of Apr 14 2232   Sun Apr 14, 2019   3148 Plan is to treat symptomatically with analgesia, muscle relaxers, and anti-inflammatories and reevaluate. Anticipate discharge home to follow-up with her primary care physician for a repeat MRI and possible neurosurgery evaluation as an outpatient. [JM]      ED Course User Index  [JM] Reese Burdick DO       Patient had MRI done in January of this year that showed an foraminal narrowing but no other significant abnormalities. I do not feel that there is need for additional imaging at this time as patient has had no additional trauma or injury. Patient has no red flags for neck pain including no loss of bowel or bladder, no focal neurologic deficits, is afebrile, no history of any drug use. We will treat patient symptomatically here in the emergency department and discharge with prescriptions for Motrin and Flexeril and have her keep her appointment with her pain management physician tomorrow morning. Recommended to patient to obtain outpatient MRI as well as outpatient neurosurgery evaluation. Possibility of physical therapy would help patient's symptoms. Patient is symptomatically improved after analgesia and as relaxants. Patient instructed to return to the Emergency Department if symptoms worsen or new onset of symptoms occurs. If patient did not have a Primary Care physician, they were given contact information to contact [facility] to setup as a new patient, for continued care and treatment. Discussed findings of laboratory workup and imaging, if applicable. All questions and concerns were answered, and patient offered no additional complaints or concerns. Return precautions were given to patient, patient acknowledged understanding of return precautions and was able to relay signs and symptoms back that would need them to return to the Emergency Department. All prescriptions if given were discussed.  Pt is agreeable to plan and is stable for discharge home at this time.    RADIOLOGY:  none    PROCEDURES:  None    CONSULTS:  None    CRITICAL CARE:  None    FINAL IMPRESSION      1. Lumbar foraminal stenosis    2.  Idiopathic peripheral neuropathy          DISPOSITION / PLAN     DISPOSITION        PATIENT REFERRED TO:  Christiana Carmona MD  [address] [facility] [address] [phone]    Schedule an appointment as soon as possible for a visit       [facility] ED  [address] [facility] [address]  [phone]  Go to   If symptoms worsen      DISCHARGE MEDICATIONS:  Discharge Medication List as of 4/14/2019  4:17 PM      START taking these medications    Details   ibuprofen (IBU) 800 MG tablet Take 1 tablet by mouth every 8 hours as needed for Pain, Disp-45 tablet, R-0Print      cyclobenzaprine (FLEXERIL) 10 MG tablet Take 1 tablet by mouth 2 times daily as needed for Muscle spasms, Disp-30 tablet, R-0Print             Jude Harrington DO  Emergency Medicine Resident    (Please note that portions of this note were completed with a voice recognition program.  Effortswere made to edit the dictations but occasionally words are mis-transcribed.)     Jude Harrington DO  04/14/19 2060

## 2019-04-14 NOTE — ED NOTES
Pt states she still has leg pain and wants to speak to Dr. Jair Aponte.       Magdiel Luis RN  04/14/19 [id number]

## 2019-04-14 NOTE — ED NOTES
Bed: 16  Expected date: 4/14/19  Expected time: 3:07 PM  Means of arrival: [facility] [place]  Comments:  R 13-sushma Ugalde RN  04/14/19 [id number]

## 2019-04-14 NOTE — ED NOTES
Pt to ed from home via [facility] after having muscle spasms since Wednesday from right thigh to right leg. Pt reports 10/10 pain. Pt reports wearing o2 as needed at home \"when I get like this\" pt denies nausea and vomiting.  Eating and drinking normal.      Maria Elena Feliciano, REUBEN  04/14/19 4396

## 2019-04-15 ENCOUNTER — TELEPHONE (OUTPATIENT)
Dept: FAMILY MEDICINE CLINIC | Age: 69
End: 2019-04-15

## 2019-04-15 ENCOUNTER — HOSPITAL ENCOUNTER (OUTPATIENT)
Dept: PAIN MANAGEMENT | Age: 69
Discharge: HOME | End: 2019-04-15
Payer: COMMERCIAL

## 2019-04-15 ENCOUNTER — OFFICE VISIT (OUTPATIENT)
Dept: PODIATRY | Age: 69
End: 2019-04-15
Payer: COMMERCIAL

## 2019-04-15 VITALS
OXYGEN SATURATION: 93 % | RESPIRATION RATE: 16 BRPM | TEMPERATURE: 98 F | HEIGHT: 62 IN | WEIGHT: 246 LBS | BODY MASS INDEX: 45.27 KG/M2 | DIASTOLIC BLOOD PRESSURE: 53 MMHG | SYSTOLIC BLOOD PRESSURE: 98 MMHG | HEART RATE: 71 BPM

## 2019-04-15 VITALS — WEIGHT: 246 LBS | HEIGHT: 62 IN | BODY MASS INDEX: 45.27 KG/M2

## 2019-04-15 DIAGNOSIS — G62.9 NEUROPATHY: ICD-10-CM

## 2019-04-15 DIAGNOSIS — G89.29 ENCOUNTER FOR CHRONIC PAIN MANAGEMENT: ICD-10-CM

## 2019-04-15 DIAGNOSIS — M50.30 DEGENERATIVE CERVICAL DISC: ICD-10-CM

## 2019-04-15 DIAGNOSIS — M54.81 BILATERAL OCCIPITAL NEURALGIA: Chronic | ICD-10-CM

## 2019-04-15 DIAGNOSIS — Z51.81 MEDICATION MONITORING ENCOUNTER: Primary | Chronic | ICD-10-CM

## 2019-04-15 DIAGNOSIS — F11.90 CHRONIC, CONTINUOUS USE OF OPIOIDS: ICD-10-CM

## 2019-04-15 DIAGNOSIS — M47.817 LUMBOSACRAL SPONDYLOSIS WITHOUT MYELOPATHY: Chronic | ICD-10-CM

## 2019-04-15 DIAGNOSIS — M79.674 PAIN OF TOES OF BOTH FEET: ICD-10-CM

## 2019-04-15 DIAGNOSIS — M25.562 CHRONIC PAIN OF LEFT KNEE: ICD-10-CM

## 2019-04-15 DIAGNOSIS — G43.009 NONINTRACTABLE MIGRAINE, UNSPECIFIED MIGRAINE TYPE: ICD-10-CM

## 2019-04-15 DIAGNOSIS — M51.36 DDD (DEGENERATIVE DISC DISEASE), LUMBAR: ICD-10-CM

## 2019-04-15 DIAGNOSIS — M50.30 DDD (DEGENERATIVE DISC DISEASE), CERVICAL: ICD-10-CM

## 2019-04-15 DIAGNOSIS — Z51.81 ENCOUNTER FOR MEDICATION MONITORING: ICD-10-CM

## 2019-04-15 DIAGNOSIS — G89.29 CHRONIC BILATERAL LOW BACK PAIN WITH SCIATICA, SCIATICA LATERALITY UNSPECIFIED: ICD-10-CM

## 2019-04-15 DIAGNOSIS — M54.16 LUMBAR RADICULOPATHY, CHRONIC: ICD-10-CM

## 2019-04-15 DIAGNOSIS — G43.119 INTRACTABLE MIGRAINE WITH AURA WITHOUT STATUS MIGRAINOSUS: ICD-10-CM

## 2019-04-15 DIAGNOSIS — M54.40 CHRONIC BILATERAL LOW BACK PAIN WITH SCIATICA, SCIATICA LATERALITY UNSPECIFIED: ICD-10-CM

## 2019-04-15 DIAGNOSIS — M46.1 SACROILIITIS, NOT ELSEWHERE CLASSIFIED (HCC): Chronic | ICD-10-CM

## 2019-04-15 DIAGNOSIS — E11.51 TYPE 2 DIABETES MELLITUS WITH PERIPHERAL VASCULAR DISEASE (HCC): ICD-10-CM

## 2019-04-15 DIAGNOSIS — M51.37 DEGENERATION OF LUMBAR OR LUMBOSACRAL INTERVERTEBRAL DISC: ICD-10-CM

## 2019-04-15 DIAGNOSIS — B35.1 ONYCHOMYCOSIS OF TOENAIL: Primary | ICD-10-CM

## 2019-04-15 DIAGNOSIS — G43.709 CHRONIC MIGRAINE WITHOUT AURA WITHOUT STATUS MIGRAINOSUS, NOT INTRACTABLE: ICD-10-CM

## 2019-04-15 DIAGNOSIS — M79.675 PAIN OF TOES OF BOTH FEET: ICD-10-CM

## 2019-04-15 DIAGNOSIS — M47.892 OTHER OSTEOARTHRITIS OF SPINE, CERVICAL REGION: ICD-10-CM

## 2019-04-15 DIAGNOSIS — G89.29 CHRONIC PAIN OF LEFT KNEE: ICD-10-CM

## 2019-04-15 PROCEDURE — 11721 DEBRIDE NAIL 6 OR MORE: CPT | Performed by: PODIATRIST

## 2019-04-15 PROCEDURE — 99214 OFFICE O/P EST MOD 30 MIN: CPT

## 2019-04-15 PROCEDURE — 99213 OFFICE O/P EST LOW 20 MIN: CPT | Performed by: NURSE PRACTITIONER

## 2019-04-15 PROCEDURE — 99999 PR OFFICE/OUTPT VISIT,PROCEDURE ONLY: CPT | Performed by: PODIATRIST

## 2019-04-15 RX ORDER — OXYCODONE HYDROCHLORIDE AND ACETAMINOPHEN 5; 325 MG/1; MG/1
1 TABLET ORAL EVERY 6 HOURS PRN
Qty: 100 TABLET | Refills: 0 | Status: SHIPPED | OUTPATIENT
Start: 2019-04-18 | End: 2019-05-16 | Stop reason: SDUPTHER

## 2019-04-15 ASSESSMENT — ENCOUNTER SYMPTOMS
NAUSEA: 1
BACK PAIN: 1
SHORTNESS OF BREATH: 1

## 2019-04-15 NOTE — PROGRESS NOTES
Snehal 88 PROGRESS NOTE      Patient here today to review Medication Agreement and follow up after procedure    Chief Complaint: low back pain      HPI: She c/o low back pain radiating down right leg. She c/o spasms in buttock and right leg. She went to the Ed  for her back pain and cramps right leg and due to fall. Stevo Tavarez She was given prescription for flexeril and ibuprofen and she states will be seeing Neurosurgery. No history of lumbar surgery. She has been through PT in the past and it did not help. She had lumbar epidural injection 3-19-19 L2, L3 with 95% relief on left side. Relief ongoing left side. The muscle spasms started a week ago in the right leg. She sttaes fell at home yesterday, states her right leg was wobbly. She has not been sleeping well lately. She does some back exercises before pain increased. She states neck pain and headaches under control. Back Pain   This is a chronic problem. The current episode started more than 1 year ago. The problem occurs constantly. The problem has been gradually worsening since onset. The pain is present in the lumbar spine. Quality: throbbing buttock, spasms right leg. The pain is at a severity of 9/10. The pain is severe. The pain is worse during the night. The symptoms are aggravated by sitting. Associated symptoms include weakness. (Right leg) Risk factors include menopause, obesity and sedentary lifestyle. She has tried heat, analgesics and muscle relaxant for the symptoms. The treatment provided mild relief. Patient denies any new neurological symptoms. No bowel or bladder incontinence, no weakness, and no falling. Treatment goals:  Functional status: reduce pain 5      Aberrancy:   Any alcoholic beverages no      Any illegal drugs   no      Analgesia:pain 9      Adverse  Effects :nausea BM daily    ADL;s :stretching        Pill count: appropriate    Morphine equivalent dose as reported on OARRS:25  Attestation:  The urine Not Detected    Final 06/08/2018  9:45 AM [facility]   Barbiturates, Ur Not Detected    Final 06/08/2018  9:45 AM [facility]   Benzoylecgonine, Ur Not Detected    Final 06/08/2018  9:45 AM [facility]   Buprenorphine Urine Not Detected    Final 06/08/2018  9:45 AM [facility]   Carisoprodol, Ur Not Detected    Final 06/08/2018  9:45 AM [facility]   (NOTE)   The carisoprodol immunoassay has cross-reactivity to carisoprodol   and meprobamate.     Clonazepam, Urine Not Detected    Final 06/08/2018  9:45 AM [facility]   Codeine, Urine Not Detected    Final 06/08/2018  9:45 AM [facility]   MDA, Ur Not Detected    Final 06/08/2018  9:45 AM [facility]   Diazepam, Urine Not Detected    Final 06/08/2018  9:45 AM [facility]   Ethyl Glucuronide Ur Not Detected    Final 06/08/2018  9:45 AM [facility]   Fentanyl, Ur Not Detected    Final 06/08/2018  9:45 AM [facility]   Hydrocodone, Urine Not Detected    Final 06/08/2018  9:45 AM [facility]   Hydromorphone, Urine Not Detected    Final 06/08/2018  9:45 AM [facility]   Lorazepam, Urine Not Detected    Final 06/08/2018  9:45 AM [facility]   Marijuana Metab, Ur Not Detected    Final 06/08/2018  9:45 AM [facility]   MDEA, ALMA, Ur Not Detected    Final 06/08/2018  9:45 AM [facility]   MDMA, Urine Not Detected    Final 06/08/2018  9:45 AM [facility]   Meperidine Metab, Ur Not Detected    Final 06/08/2018  9:45 AM [facility]   Methadone, Urine Not Detected    Final 06/08/2018  9:45 AM [facility]   Methamphetamine, Urine Not Detected    Final 06/08/2018  9:45 AM [facility]   Methylphenidate Not Detected    Final 06/08/2018  9:45 AM [facility]   Midazolam, Urine Not Detected    Final 06/08/2018  9:45 AM [facility]   Morphine Urine Not Detected    Final 06/08/2018  9:45 AM [facility]   Norbuprenorphine, Urine Not Detected    Final 06/08/2018  9:45 AM [facility]   Nordiazepam, Urine Not Detected    Final 06/08/2018  9:45 AM [facility]   Norfentanyl, Urine Not Detected    Final 06/08/2018  9:45 AM [facility]   NORHYDROCODONE, URINE Not Detected    Final 06/08/2018  9:45 AM [facility]   Noroxycodone, Urine Not Detected    Final 06/08/2018  9:45 AM Confirmatory testing by mass   spectrometry for immunoassay-based results is available, if   ordered within two weeks of specimen collection. Additional   charges apply. For medical purposes only; not valid for forensic use. This test was developed and its performance characteristics   determined by [facility]. The U.S. Food and Drug   Administration has not approved or cleared this test; however, FDA   clearance or approval is not currently required for clinical use. The results are not intended to be used as the sole means for   clinical diagnosis or patient management decisions. EER Hi Res Interp Ur See Note    Final 06/08/2018  9:45 AM [facility]   (NOTE)   Access [facility] Enhanced Report using either link below:   -Direct access: [email or web address]   -Enter Username, Password: [email or web address]   Username: [email or web address]   Password: [email or web address]   Performed by [facility] [phone]   www. Leah Abdullahi MD, Lab. Director   Performed at [address] [place] [address] Dr Rikki Vera. [address] [phone]              Past Medical History:   Diagnosis Date    Allergic rhinitis     Anxiety 7/17/2013    Arthritis     Asthma     Atrial fibrillation (HCC)     Back pain     NERVE/DR. GRACIA    CAD (coronary artery disease) 3/21/2013    Caffeine use     2 coffee/day    CHF (congestive heart failure) (HCC)     Chronic kidney disease     COPD (chronic obstructive pulmonary disease) (HCC)     emphysema    Degeneration of lumbar or lumbosacral intervertebral disc     Depression     DM (diabetes mellitus) (HCC)     Emphysema of lung (HCC)     Gastritis     GERD (gastroesophageal reflux disease)     Hearing loss     Hematuria     Hiatal hernia     HTN (hypertension), benign 6/28/2014    Hypertriglyceridemia     Incontinence of urine 9/25/2013    Knee arthropathy     Lumbosacral spondylosis without myelopathy 9/29/2014    Migraine headache     DR. BASIL [place]     Neuropathy     Obesity     On home oxygen therapy     2 L PER NC  HS AND PRN    HIGINIO on CPAP     Otitis media 1-26-15    Stroke (HCC)     QUESTIONABLE    Tinnitus     Type 2 diabetes mellitus without complication (HCC)     Type II or unspecified type diabetes mellitus without mention of complication, not stated as uncontrolled     UTI (lower urinary tract infection)     Varicose vein        Past Surgical History:   Procedure Laterality Date    ABLATION OF DYSRHYTHMIC FOCUS  2000    CARPAL TUNNEL RELEASE Right     CATARACT REMOVAL WITH IMPLANT Bilateral     CHOLECYSTECTOMY  2007    COLONOSCOPY      COLONOSCOPY  04/10/2017    tubular adenomo polyp , mod. sigmoid diverticulosis, min. int. hemorrhoids    CYSTOSCOPY  9/25/2013    DILATION AND CURETTAGE  1979    EYE SURGERY      laser    INCONTINENCE SURGERY      Percutaneous nerve stimulation Dr Becca Amaya 2013     INSERTABLE CARDIAC MONITOR  12/04/2015    [email or web address] REVEAL LINQ MODEL #[id number] MRI CONDTIONAL OK 3T. IMMEDIATELY POST IMPLANT    OTHER SURGICAL HISTORY  09/10/15    removal of interstim    MN COLSC FLX W/REMOVAL LESION BY HOT BX FORCEPS N/A 4/10/2017    COLONOSCOPY POLYPECTOMY HOT BIOPSY performed by Jude Callaway MD at [address] [place]      TYMPANOPLASTY      TYMPANOPLASTY      TYMPANOSTOMY TUBE PLACEMENT  08/21/2017    UPPER GASTROINTESTINAL ENDOSCOPY  03/2016    Dr Digna Bird       Allergies   Allergen Reactions    Robitussin [Guaifenesin] Anaphylaxis    Codeine Swelling    Compazine [Prochlorperazine Maleate] Hives and Swelling    Iodides Itching    Morphine Other (See Comments)     hallucinations    Moxifloxacin      Other reaction(s): Intolerance-unknown  Other reaction(s):  Intolerance-unknown    Reglan [Metoclopramide] Nausea Only    Avelox [Moxifloxacin Hcl In Nacl] Rash     Dermatitis      Cipro Xr Rash     dermatitis    Sulfa Antibiotics Rash         Current Outpatient Medications:     [START ON 4/18/2019] oxyCODONE-acetaminophen (PERCOCET) 5-325 MG per tablet, Take 1 tablet by mouth every 6 hours as needed for Pain for up to 30 days. Intended supply: 30 days. Take lowest dose possible to manage pain max. 100/month, Disp: 100 tablet, Rfl: 0    citalopram (CELEXA) 40 MG tablet, TAKE 1/2  TAB BY MOUTH TWICE  A DAY, Disp: 90 tablet, Rfl: 3    magnesium oxide (MAG-OX) 400 MG tablet, TAKE 1 TAB BY MOUTH TWICE A DAY ( AM AND BEDTIME ), Disp: 60 tablet, Rfl: 0    ferrous sulfate 325 (65 Fe) MG tablet, TAKE 1 TAB BY MOUTH IN THE MORNING, Disp: 30 tablet, Rfl: 0    carvedilol (COREG) 3.125 MG tablet, TAKE 1 TAB BY MOUTH TWICE A DAY ( AM AND BEDTIME ), Disp: 60 tablet, Rfl: 0    pantoprazole (PROTONIX) 40 MG tablet, Take 1 tablet by mouth 2 times daily, Disp: 60 tablet, Rfl: 3    lidocaine (LMX) 4 % cream, Apply topically every 8 hrs as needed for pain, Disp: 45 g, Rfl: 3    LANTUS SOLOSTAR 100 UNIT/ML injection pen, INJECT 45 UNITS IN THE MORNING ,AND 35 UNITS NIGHTLY, Disp: 30 mL, Rfl: 0    topiramate (TOPAMAX) 100 MG tablet, Take 1 tablet by mouth nightly, Disp: 90 tablet, Rfl: 0    nystatin (MYCOSTATIN) 429990 UNIT/GM powder, Apply 3 times daily. , Disp: 3 Bottle, Rfl: 3    losartan (COZAAR) 25 MG tablet, TAKE 1 TAB BY MOUTH ONCE A DAY, Disp: 90 tablet, Rfl: 5    clopidogrel (PLAVIX) 75 MG tablet, TAKE 1 TAB BY MOUTH ONCE A DAY, Disp: 90 tablet, Rfl: 3    dicyclomine (BENTYL) 20 MG tablet, Take 1 tablet by mouth every 6 hours, Disp: 15 tablet, Rfl: 0    vitamin B-12 (CYANOCOBALAMIN) 1000 MCG tablet, Take 1,000 mcg by mouth daily, Disp: , Rfl:     ranitidine (ZANTAC) 150 MG tablet, TAKE 1 TAB BY MOUTH TWICE A DAY, Disp: 180 tablet, Rfl: 3    metFORMIN (GLUCOPHAGE) 1000 MG tablet, TAKE 1 TAB BY MOUTH TWICE DAILY, Disp: 180 tablet, Rfl: 3    ipratropium-albuterol (DUONEB) 0.5-2.5 (3) MG/3ML SOLN nebulizer solution, Inhale 3 mLs administration of intravenous contrast.       COMPARISON:   CT lumbar spine January 7, 2019       HISTORY:   ORDERING SYSTEM PROVIDED HISTORY: BACK PAIN, >6 WKS DESPITE CONSERVATIVE [place]       FINDINGS:   BONES/ALIGNMENT: There is normal alignment of the spine. The vertebral body   heights are maintained. The bone marrow signal appears unremarkable.       SPINAL CORD: The conus terminates normally.       SOFT TISSUES: No paraspinal mass identified.       L1-L2: There is no significant disc herniation, spinal canal stenosis or   neural foraminal narrowing.       L2-L3: There is no significant disc herniation, spinal canal stenosis or   neural foraminal narrowing.       L3-L4: Moderate neural foraminal narrowing bilaterally due to a   circumferential disc marginal osteophyte and hypertrophic facet disease.    Central thecal sac measures 7 mm in the midline.       L4-L5: Moderate narrowing of the neural foramina bilaterally due to a   circumferential disc marginal osteophyte.  Central thecal sac is patent.       L5-S1: Small posterior disc marginal osteophyte.  Mild narrowing of the   neural foramina bilaterally.  Central thecal sac is patent.           Impression   Multilevel degenerative disc disease and neural foraminal narrowing as noted   above.             Assessment:  Problem List Items Addressed This Visit     Spondylarthrosis    Relevant Medications    oxyCODONE-acetaminophen (PERCOCET) 5-325 MG per tablet (Start on 4/18/2019)    Sacroiliitis, not elsewhere classified ([facility] [address] (Chronic)    Relevant Medications    oxyCODONE-acetaminophen (PERCOCET) 5-325 MG per tablet (Start on 4/18/2019)    Occipital neuralgia (Chronic)    Relevant Medications    oxyCODONE-acetaminophen (PERCOCET) 5-325 MG per tablet (Start on 4/18/2019)    Nonintractable migraine, unspecified migraine type    Relevant Medications    oxyCODONE-acetaminophen (PERCOCET) 5-325 MG per tablet (Start on 4/18/2019)    Neuropathy    Medication monitoring encounter - Primary (Chronic)    Lumbosacral spondylosis without myelopathy (Chronic)    Relevant Medications    oxyCODONE-acetaminophen (PERCOCET) 5-325 MG per tablet (Start on 4/18/2019)    Lumbar radiculopathy, chronic    Relevant Medications    oxyCODONE-acetaminophen (PERCOCET) 5-325 MG per tablet (Start on 4/18/2019)    Intractable migraine with aura without status migrainosus    Relevant Medications    oxyCODONE-acetaminophen (PERCOCET) 5-325 MG per tablet (Start on 4/18/2019)    DDD (degenerative disc disease), lumbar    Relevant Medications    oxyCODONE-acetaminophen (PERCOCET) 5-325 MG per tablet (Start on 4/18/2019)    DDD (degenerative disc disease), cervical (Chronic)    Relevant Medications    oxyCODONE-acetaminophen (PERCOCET) 5-325 MG per tablet (Start on 4/18/2019)    Chronic, continuous use of opioids    Chronic pain of left knee    Relevant Medications    oxyCODONE-acetaminophen (PERCOCET) 5-325 MG per tablet (Start on 4/18/2019)    Chronic migraine without aura without status migrainosus, not intractable    Relevant Medications    oxyCODONE-acetaminophen (PERCOCET) 5-325 MG per tablet (Start on 4/18/2019)    Bilateral low back pain with sciatica    Relevant Medications    oxyCODONE-acetaminophen (PERCOCET) 5-325 MG per tablet (Start on 4/18/2019)      Other Visit Diagnoses     Degenerative cervical disc        Relevant Medications    oxyCODONE-acetaminophen (PERCOCET) 5-325 MG per tablet (Start on 4/18/2019)    Encounter for chronic pain management        Encounter for medication monitoring        Degeneration of lumbar or lumbosacral intervertebral disc        Relevant Medications    oxyCODONE-acetaminophen (PERCOCET) 5-325 MG per tablet (Start on 4/18/2019)            Treatment Plan:  DISCUSSION: Treatment options discussed withpatient and all questions answered to patient's satisfaction.      Possible side effects, risk of tolerance and or dependence and alternative treatments discussed    Obtaining appropriate analgesic effect of treatment   No signs of potential drug abuse or diversion identified    [x] Ill effects of being on chronic pain medications such as sleep disturbances, respiratory depression, hormonal changes, withdrawal symptoms, chronic opioid dependence and tolerance as well as risk of taking opioids with Benzodiazepines and taking opioids along with alcohol,  werediscussed with patient. I had asked the patient to minimize medication use and utilize pain medications only for uncontrolled rest pain or pain with exertional activities. I advised patient not to self-escalate painmedications without consulting with us. At each of patient's future visits we will try to taper pain medications, while adjusting the adjunct medications, and re-evaluating for Physical Therapy to improve spinal andjoint strength. We will continue to have discussions to decrease pain medications as tolerated. Counseled patient on effects their pain medication and /or their medical condition mayhave on their  ability to drive or operate machinery. Instructed not to drive or operate machinery if drowsy     I also discussed with the patient regarding the dangers of combining narcotic pain medication with tranquilizers, alcohol or illegal drugs or taking the medication any way other than prescribed. The dangers were discussed  including respiratory depression and death. Patient was told to tell  all  physicians regarding the medications he is getting from pain clinic. Patient is warned not to take any unprescribed medications over-the-countermedications that can depress breathing . Patient is advised to talk to the pharmacist or physicians if planning to take any over-the-counter medications before  takeing them.  Patient is strongly advised to avoid tranquilizers or  relaxants, illegal drugs  or any medications that can depress breathing  Patient is also advised to tell us if there is any changes in their medications from other physicians. Patient was given a verbal warning not to self escalate pain medications, risks of respiratory depression or death discussed, another issue may result in discharge from the pain clinic. Patient has been short on her pain medication before. She was told this is her final warning.     TREATMENT OPTIONS:   MED AGREEMENT UPODATE  Return in 4 weeks  Medication Agreement Requirements Met  Continue Opioid therapy  Script written for percocet  Follow up appointment made

## 2019-04-15 NOTE — TELEPHONE ENCOUNTER
[facility] ([facility]) ED Follow up Call    Reason for ED visit:  Back pain             FU appts/Provider:    Future Appointments   Date Time Provider Clarence Tg   4/15/2019  4:15 PM Kylah Singh, [address]   4/24/2019 10:00 AM Char Mejía MD fp [facility]   5/16/2019 10:20 AM Mana Lipoma, APRN - CNP [facility] None   6[facility]10/2019 10:30 [facility]       VOICEMAIL DOCUMENTATION - ERASE IF NOT USED  Hi, this message is for  Ojsie  This is Deysi Edwards from [facility] office. Just calling to see how you are doing after your recent visit to the Emergency Room. [facility] wants to make sure you were able to fill any prescriptions and that you understand your discharge instructions. Please return our call if you need to make a follow up appointment with your provider or have any further needs. Our phone number is [phone]. Have a great day.

## 2019-04-17 DIAGNOSIS — Z79.4 TYPE 2 DIABETES MELLITUS WITH DIABETIC POLYNEUROPATHY, WITH LONG-TERM CURRENT USE OF INSULIN (HCC): Primary | Chronic | ICD-10-CM

## 2019-04-17 DIAGNOSIS — E83.42 HYPOMAGNESEMIA: Primary | ICD-10-CM

## 2019-04-17 DIAGNOSIS — E11.42 TYPE 2 DIABETES MELLITUS WITH DIABETIC POLYNEUROPATHY, WITH LONG-TERM CURRENT USE OF INSULIN (HCC): Primary | Chronic | ICD-10-CM

## 2019-04-17 DIAGNOSIS — D64.9 ANEMIA, UNSPECIFIED TYPE: ICD-10-CM

## 2019-04-17 DIAGNOSIS — I10 HTN (HYPERTENSION), BENIGN: Chronic | ICD-10-CM

## 2019-04-17 RX ORDER — CARVEDILOL 3.12 MG/1
TABLET ORAL
Qty: 60 TABLET | Refills: 0 | Status: SHIPPED | OUTPATIENT
Start: 2019-04-17 | End: 2019-05-17 | Stop reason: SDUPTHER

## 2019-04-17 RX ORDER — FERROUS SULFATE 325(65) MG
TABLET ORAL
Qty: 30 TABLET | Refills: 0 | Status: SHIPPED | OUTPATIENT
Start: 2019-04-17 | End: 2019-05-17 | Stop reason: SDUPTHER

## 2019-04-17 RX ORDER — MAGNESIUM OXIDE 400 MG/1
TABLET ORAL
Qty: 60 TABLET | Refills: 0 | Status: SHIPPED | OUTPATIENT
Start: 2019-04-17 | End: 2019-05-17 | Stop reason: SDUPTHER

## 2019-04-17 NOTE — TELEPHONE ENCOUNTER
Please Approve or Refuse.   Send to Pharmacy per Pt's Request:      Next Visit Date:  4/24/2019   Last Visit Date: 3/7/2019    Hemoglobin A1C (%)   Date Value   01/09/2019 6.9 (H)   09/25/2018 7.0 (H)   08/07/2018 6.7             ( goal A1C is < 7)   BP Readings from Last 3 Encounters:   04/15/19 (!) 98/53   04/14/19 (!) 159/77   03/19/19 (!) 139/56          (goal 120/80)  BUN   Date Value Ref Range Status   03/14/2019 19 8 - 23 mg/dL Final     CREATININE   Date Value Ref Range Status   03/14/2019 0.92 (H) 0.50 - 0.90 mg/dL Final     Potassium   Date Value Ref Range Status   03/14/2019 4.4 3.7 - 5.3 mmol/L Final

## 2019-04-17 NOTE — TELEPHONE ENCOUNTER
Please Approve or Refuse.   Send to Pharmacy per Pt's Request: [facility]   Next Visit Date:  4/24/2019   Last Visit Date: 3/7/2019    Hemoglobin A1C (%)   Date Value   01/09/2019 6.9 (H)   09/25/2018 7.0 (H)   08/07/2018 6.7             ( goal A1C is < 7)   BP Readings from Last 3 Encounters:   04/15/19 (!) 98/53   04/14/19 (!) 159/77   03/19/19 (!) 139/56          (goal 120/80)  BUN   Date Value Ref Range Status   03/14/2019 19 8 - 23 mg/dL Final     CREATININE   Date Value Ref Range Status   03/14/2019 0.92 (H) 0.50 - 0.90 mg/dL Final     Potassium   Date Value Ref Range Status   03/14/2019 4.4 3.7 - 5.3 mmol/L Final

## 2019-04-18 ASSESSMENT — ENCOUNTER SYMPTOMS
NAUSEA: 0
BACK PAIN: 0
COLOR CHANGE: 0
SHORTNESS OF BREATH: 0
DIARRHEA: 0

## 2019-04-24 ENCOUNTER — OFFICE VISIT (OUTPATIENT)
Dept: FAMILY MEDICINE CLINIC | Age: 69
End: 2019-04-24
Payer: COMMERCIAL

## 2019-04-24 VITALS
DIASTOLIC BLOOD PRESSURE: 71 MMHG | BODY MASS INDEX: 46.38 KG/M2 | OXYGEN SATURATION: 93 % | WEIGHT: 252 LBS | HEIGHT: 62 IN | SYSTOLIC BLOOD PRESSURE: 103 MMHG | HEART RATE: 79 BPM

## 2019-04-24 DIAGNOSIS — E11.42 TYPE 2 DIABETES MELLITUS WITH DIABETIC POLYNEUROPATHY, WITH LONG-TERM CURRENT USE OF INSULIN (HCC): Chronic | ICD-10-CM

## 2019-04-24 DIAGNOSIS — M47.817 LUMBOSACRAL SPONDYLOSIS WITHOUT MYELOPATHY: Chronic | ICD-10-CM

## 2019-04-24 DIAGNOSIS — M51.36 DDD (DEGENERATIVE DISC DISEASE), LUMBAR: ICD-10-CM

## 2019-04-24 DIAGNOSIS — Z79.4 TYPE 2 DIABETES MELLITUS WITH DIABETIC POLYNEUROPATHY, WITH LONG-TERM CURRENT USE OF INSULIN (HCC): Chronic | ICD-10-CM

## 2019-04-24 DIAGNOSIS — Z23 NEED FOR PROPHYLACTIC VACCINATION AGAINST DIPHTHERIA-TETANUS-PERTUSSIS (DTP): ICD-10-CM

## 2019-04-24 DIAGNOSIS — N30.01 ACUTE CYSTITIS WITH HEMATURIA: Primary | ICD-10-CM

## 2019-04-24 DIAGNOSIS — R10.9 INTRACTABLE ABDOMINAL PAIN: ICD-10-CM

## 2019-04-24 PROBLEM — R07.9 CHEST PAIN: Status: RESOLVED | Noted: 2018-07-27 | Resolved: 2019-04-24

## 2019-04-24 LAB — HBA1C MFR BLD: 6.6 %

## 2019-04-24 PROCEDURE — G8427 DOCREV CUR MEDS BY ELIG CLIN: HCPCS | Performed by: FAMILY MEDICINE

## 2019-04-24 PROCEDURE — 83036 HEMOGLOBIN GLYCOSYLATED A1C: CPT | Performed by: FAMILY MEDICINE

## 2019-04-24 PROCEDURE — 1111F DSCHRG MED/CURRENT MED MERGE: CPT | Performed by: FAMILY MEDICINE

## 2019-04-24 PROCEDURE — 1090F PRES/ABSN URINE INCON ASSESS: CPT | Performed by: FAMILY MEDICINE

## 2019-04-24 PROCEDURE — 1123F ACP DISCUSS/DSCN MKR DOCD: CPT | Performed by: FAMILY MEDICINE

## 2019-04-24 PROCEDURE — 2022F DILAT RTA XM EVC RTNOPTHY: CPT | Performed by: FAMILY MEDICINE

## 2019-04-24 PROCEDURE — G8598 ASA/ANTIPLAT THER USED: HCPCS | Performed by: FAMILY MEDICINE

## 2019-04-24 PROCEDURE — G8417 CALC BMI ABV UP PARAM F/U: HCPCS | Performed by: FAMILY MEDICINE

## 2019-04-24 PROCEDURE — G8399 PT W/DXA RESULTS DOCUMENT: HCPCS | Performed by: FAMILY MEDICINE

## 2019-04-24 PROCEDURE — 4040F PNEUMOC VAC/ADMIN/RCVD: CPT | Performed by: FAMILY MEDICINE

## 2019-04-24 PROCEDURE — 1036F TOBACCO NON-USER: CPT | Performed by: FAMILY MEDICINE

## 2019-04-24 PROCEDURE — 3044F HG A1C LEVEL LT 7.0%: CPT | Performed by: FAMILY MEDICINE

## 2019-04-24 PROCEDURE — 3017F COLORECTAL CA SCREEN DOC REV: CPT | Performed by: FAMILY MEDICINE

## 2019-04-24 PROCEDURE — 99214 OFFICE O/P EST MOD 30 MIN: CPT | Performed by: FAMILY MEDICINE

## 2019-04-24 ASSESSMENT — ENCOUNTER SYMPTOMS
BACK PAIN: 1
COUGH: 0
SORE THROAT: 0
CHEST TIGHTNESS: 0
RHINORRHEA: 0
VOMITING: 0
CONSTIPATION: 0
ABDOMINAL PAIN: 0
SINUS PRESSURE: 0
PHOTOPHOBIA: 0
WHEEZING: 0
ABDOMINAL DISTENTION: 0
SHORTNESS OF BREATH: 0
BLOOD IN STOOL: 0
DIARRHEA: 0
EYE REDNESS: 0

## 2019-04-24 NOTE — PROGRESS NOTES
Visit Information    Have you changed or started any medications since your last visit including any over-the-counter medicines, vitamins, or herbal medicines? no   Are you having any side effects from any of your medications? -  no  Have you stopped taking any of your medications? Is so, why? -  no    Have you seen any other physician or provider since your last visit? No  Have you had any other diagnostic tests since your last visit? Yes - Records Obtained  Have you been seen in the emergency room and/or had an admission to a hospital since we last saw you? Yes - Records Obtained  Have you had your routine dental cleaning in the past 6 months? no    Have you activated your [employer] account? If not, what are your barriers?  Yes     Patient Care Team:  Gunjan Ramos MD as PCP - General (Family Medicine)  Antonio Sanford MD as Consulting Physician (Urology)  Rosemarie Delong MD as Consulting Physician (Pulmonology)  Mathieu Vela MD as Consulting Physician (Internal Medicine)  Myron Romero (Optometry)  Crispin Klinefelter, APRN - CNP as Nurse Practitioner (Certified Nurse Practitioner)    Medical History Review  Past Medical, Family, and Social History reviewed and does contribute to the patient presenting condition    Health Maintenance   Topic Date Due    DTaP/Tdap/Td vaccine (1 - Tdap) 12/24/1968    Shingles Vaccine (1 of 2) 12/24/1999    Diabetic retinal exam  07/13/2017    Diabetic microalbuminuria test  08/07/2019    Lipid screen  09/25/2019    A1C test (Diabetic or Prediabetic)  01/09/2020    Potassium monitoring  03/14/2020    Creatinine monitoring  03/14/2020    Colon cancer screen colonoscopy  04/10/2020    Diabetic foot exam  04/18/2020    Breast cancer screen  09/27/2020    DEXA (modify frequency per FRAX score)  Completed    Flu vaccine  Completed    Pneumococcal 65+ years Vaccine  Completed    Hepatitis C screen  Completed

## 2019-04-24 NOTE — PROGRESS NOTES
Obstructive sleep apnea syndrome    Asthma with COPD ([facility] [address]    Gastroesophageal reflux disease with esophagitis    Recurrent UTI    Morbid obesity with BMI of 40.0-44.9, adult (HCC)    Congestive heart failure (HCC)    Permanent atrial fibrillation (HCC)    Intractable abdominal pain       Allergies   Allergen Reactions    Robitussin [Guaifenesin] Anaphylaxis    Codeine Swelling    Compazine [Prochlorperazine Maleate] Hives and Swelling    Iodides Itching    Morphine Other (See Comments)     hallucinations    Moxifloxacin      Other reaction(s): Intolerance-unknown  Other reaction(s): Intolerance-unknown    Reglan [Metoclopramide] Nausea Only    Avelox [Moxifloxacin Hcl In Nacl] Rash     Dermatitis      Cipro Xr Rash     dermatitis    Sulfa Antibiotics Rash       Medications listed as ordered at the time of discharge from hospital   Jena Winn   Home Medication Instructions GABO:    Printed on:04/24/19 1885   Medication Information                      Alcohol Swabs (B-D SINGLE USE SWABS REGULAR) PADS  THREE TIMES A DAY             Blood Glucose Monitoring Suppl HARMEET  Use daily to check BS             Calcium Carb-Cholecalciferol (OYSCO D) 250-125 MG-UNIT TABS  Take 1 tablet by mouth daily             carvedilol (COREG) 3.125 MG tablet  TAKE 1 TAB BY MOUTH TWICE A DAY ( AM AND BEDTIME )             citalopram (CELEXA) 40 MG tablet  TAKE 1/2  TAB BY MOUTH TWICE  A DAY             clopidogrel (PLAVIX) 75 MG tablet  TAKE 1 TAB BY MOUTH ONCE A DAY             Compression Stockings MISC  by Does not apply route Pressure between 20 - 25 .              cyclobenzaprine (FLEXERIL) 10 MG tablet  Take 1 tablet by mouth 2 times daily as needed for Muscle spasms             dicyclomine (BENTYL) 20 MG tablet  Take 1 tablet by mouth every 6 hours             docusate sodium (COLACE) 100 MG capsule  Take 1 capsule by mouth 2 times daily Please use while taking Percocet             ferrous sulfate 325 (65 Fe) MG tablet  TAKE 1 TAB BY MOUTH IN THE MORNING             furosemide (LASIX) 20 MG tablet  Take 1 tablet by mouth daily as needed (weight gain 3 lbs overnight)             ibuprofen (IBU) 800 MG tablet  Take 1 tablet by mouth every 8 hours as needed for Pain             insulin aspart (NOVOLOG FLEXPEN) 100 UNIT/ML injection pen  If <139 - No Insulin; 140-199-2 Units;200-249-4 Units;250-299-6 Units;300-349-8 Units;350-400=10 Units; Above 400-12 Units             ipratropium-albuterol (DUONEB) 0.5-2.5 (3) MG/3ML SOLN nebulizer solution  Inhale 3 mLs into the lungs every 4 hours             isosorbide mononitrate (IMDUR) 30 MG extended release tablet  TAKE 1 TABLET BY MOUTH DAILY             LANTUS SOLOSTAR 100 UNIT/ML injection pen  INJECT 45 UNITS IN THE MORNING ,AND 35 UNITS NIGHTLY             lidocaine (LMX) 4 % cream  Apply topically every 8 hrs as needed for pain             Lift Chair MISC  by Does not apply route Use daily at home to help with ADL's             losartan (COZAAR) 25 MG tablet  TAKE 1 TAB BY MOUTH ONCE A DAY             magnesium oxide (MAG-OX) 400 MG tablet  TAKE 1 TAB BY MOUTH TWICE A DAY ( AM AND BEDTIME )             Melatonin 10 MG TABS  Take 10 mg by mouth nightly             metFORMIN (GLUCOPHAGE) 1000 MG tablet  TAKE 1 TAB BY MOUTH TWICE A DAY ( AM AND BEDTIME )             nitroGLYCERIN (NITROSTAT) 0.4 MG SL tablet  1 under the tongue as needed for angina, may repeat q5mins for up three doses             nystatin (MYCOSTATIN) 871082 UNIT/GM powder  Apply 3 times daily. oxyCODONE-acetaminophen (PERCOCET) 5-325 MG per tablet  Take 1 tablet by mouth every 6 hours as needed for Pain for up to 30 days. Intended supply: 30 days. Take lowest dose possible to manage pain max.  100/month             OXYGEN  Inhale 3 L into the lungs              pantoprazole (PROTONIX) 40 MG tablet  Take 1 tablet by mouth 2 times daily             ranitidine (ZANTAC) 150 MG tablet  TAKE 1 TAB BY MOUTH TWICE A DAY             SYMBICORT 160-4.5 MCG/ACT AERO    2 puffs As needed for sob             Tdap (ADACEL) 5-2-15.5 LF-MCG/0.5 injection  Inject 0.5 mLs into the muscle once for 1 dose             topiramate (TOPAMAX) 100 MG tablet  Take 1 tablet by mouth nightly             TRUE METRIX BLOOD GLUCOSE TEST strip  THREE TIMES A DAY             ULTICARE MINI PEN NEEDLES 31G X 6 MM MISC  USE AS DIRECTED             ULTICARE SHORT PEN NEEDLES 31G X 8 MM MISC  AS DIRECTED             vitamin B-12 (CYANOCOBALAMIN) 1000 MCG tablet  Take 1,000 mcg by mouth daily             zoster recombinant adjuvanted vaccine (SHINGRIX) 50 MCG SUSR injection  50 MCG IM then repeat 2-6 months. Medications marked \"taking\" at this time  Outpatient Medications Marked as Taking for the 4/24/19 encounter (Office Visit) with Liz Evans MD   Medication Sig Dispense Refill    Tdap (ADACEL) 5-2-15.5 LF-MCG/0.5 injection Inject 0.5 mLs into the muscle once for 1 dose 0.5 mL 0    carvedilol (COREG) 3.125 MG tablet TAKE 1 TAB BY MOUTH TWICE A DAY ( AM AND BEDTIME ) 60 tablet 0    ferrous sulfate 325 (65 Fe) MG tablet TAKE 1 TAB BY MOUTH IN THE MORNING 30 tablet 0    metFORMIN (GLUCOPHAGE) 1000 MG tablet TAKE 1 TAB BY MOUTH TWICE A DAY ( AM AND BEDTIME ) 60 tablet 0    magnesium oxide (MAG-OX) 400 MG tablet TAKE 1 TAB BY MOUTH TWICE A DAY ( AM AND BEDTIME ) 60 tablet 0    oxyCODONE-acetaminophen (PERCOCET) 5-325 MG per tablet Take 1 tablet by mouth every 6 hours as needed for Pain for up to 30 days. Intended supply: 30 days. Take lowest dose possible to manage pain max.  100/month 100 tablet 0    ibuprofen (IBU) 800 MG tablet Take 1 tablet by mouth every 8 hours as needed for Pain 45 tablet 0    cyclobenzaprine (FLEXERIL) 10 MG tablet Take 1 tablet by mouth 2 times daily as needed for Muscle spasms 30 tablet 0    citalopram (CELEXA) 40 MG tablet TAKE 1/2  TAB BY MOUTH TWICE  A DAY 90 tablet 3    pantoprazole (PROTONIX) docusate sodium (COLACE) 100 MG capsule Take 1 capsule by mouth 2 times daily Please use while taking Percocet 30 capsule 0    SYMBICORT 160-4.5 MCG/ACT AERO   2 puffs As needed for sob      OXYGEN Inhale 3 L into the lungs           Medications patient taking as of now reconciled against medications ordered at time of hospital discharge: Yes    Chief Complaint   Patient presents with    Follow-Up from Hospital     abdominal pain, nausea     Diabetes    COPD    Hypertension    Results     from prev hospital stay       HPI: Patient is here for hospital follow-up was admitted 1 month before, for abdominal pain nausea vomiting. Patient reports she had similar abdominal pain could not sleep that night she called 911. She was treated with IV fluids and IV pain medications. She was also diagnosed with UTI and was treated with Keflex. Urine culture did not grow any bacteria. Previous urine culture has grown Proteus. Patient reports her symptoms has resolved. She had another ER visit 2 weeks before for chronic low back pain is on narcotic medications follows with pain management, reports the pain was even which was radiating to leg associated with leg cramps. She was given muscle relaxant reports that's helping. She feels much better denies any side effects. Diabetes A1c has decreased to 6.6, checks her sugars twice daily and they have been averaging between 130 to 140. Inpatient course: Discharge summary reviewed- see chart. Interval history/Current status: stable     Review of Systems   Constitutional: Negative for activity change, chills, fever and unexpected weight change. HENT: Negative for postnasal drip, rhinorrhea, sinus pressure, sore throat and tinnitus. Eyes: Negative for photophobia, redness and visual disturbance. Respiratory: Negative for cough, chest tightness, shortness of breath and wheezing. Cardiovascular: Negative for chest pain, palpitations and leg swelling. Gastrointestinal: Negative for abdominal distention, abdominal pain, blood in stool, constipation, diarrhea and vomiting. Endocrine: Negative for polyphagia and polyuria. Genitourinary: Negative for difficulty urinating, dysuria, flank pain, frequency, urgency and vaginal pain. Musculoskeletal: Positive for arthralgias, back pain, gait problem, joint swelling, myalgias, neck pain and neck stiffness. Skin: Negative for rash. Neurological: Positive for weakness and numbness. Negative for dizziness and headaches. Hematological: Negative for adenopathy. Does not bruise/bleed easily. Psychiatric/Behavioral: Negative for agitation, decreased concentration, dysphoric mood, hallucinations, self-injury and sleep disturbance. The patient is not nervous/anxious. Vitals:    04/24/19 1004   BP: 103/71   Pulse: 79   SpO2: 93%   Weight: 252 lb (114.3 kg)   Height: 5' 2\" (1.575 m)     Body mass index is 46.09 kg/m². Wt Readings from Last 3 Encounters:   04/24/19 252 lb (114.3 kg)   04/15/19 246 lb (111.6 kg)   04/15/19 246 lb (111.6 kg)     BP Readings from Last 3 Encounters:   04/24/19 103/71   04/15/19 (!) 98/53   04/14/19 (!) 159/77       Physical Exam   Constitutional: She is oriented to person, place, and time. She appears well-developed and well-nourished. HENT:   Head: Normocephalic. Right Ear: External ear normal.   Left Ear: External ear normal.   Eyes: Pupils are equal, round, and reactive to light. EOM are normal.   Neck: Normal range of motion. Neck supple. Cardiovascular: Normal rate, regular rhythm and normal heart sounds. No murmur heard. Pulmonary/Chest: Effort normal and breath sounds normal. She has no wheezes. She exhibits no tenderness. Abdominal: Soft. Bowel sounds are normal. She exhibits no distension. There is no tenderness. There is no guarding. Musculoskeletal:        Right shoulder: She exhibits normal range of motion and no deformity.         Right knee: She exhibits decreased range of motion and deformity. She exhibits normal alignment. Tenderness found. Medial joint line, lateral joint line and patellar tendon tenderness noted. Left knee: She exhibits decreased range of motion, effusion, deformity, erythema and bony tenderness. She exhibits no swelling. Tenderness found. Medial joint line and lateral joint line tenderness noted. Lumbar back: She exhibits decreased range of motion, tenderness, deformity, pain and spasm. She exhibits no bony tenderness, no swelling and no edema. Lymphadenopathy:     She has no cervical adenopathy. Neurological: She is alert and oriented to person, place, and time. A sensory deficit is present. No cranial nerve deficit. She exhibits abnormal muscle tone. Gait abnormal.   Skin: Skin is warm. No rash noted. Psychiatric: She has a normal mood and affect. Her speech is normal and behavior is normal. Her mood appears not anxious. Her affect is not blunt. She is not actively hallucinating. Cognition and memory are normal. She is attentive. Nursing note and vitals reviewed. Assessment/Plan:  1. Acute cystitis with hematuria  Treated with antibiotics, symptoms has resolved, repeat urine culture negative  - [place] DISCHARGE MEDS RECONCILED W/ CURRENT OUTPATIENT MED LIST    2. Type 2 diabetes mellitus with diabetic polyneuropathy, with long-term current use of insulin (HCC)  A1c has decreased to 6.6, stable continue same medications  - POCT glycosylated hemoglobin (Hb A1C)  - [place] DISCHARGE MEDS RECONCILED W/ CURRENT OUTPATIENT MED LIST    3. DDD (degenerative disc disease), lumbar  Patient is on opiates, continue same continue muscle relaxant. Patient is doing home physical therapy  - [place] DISCHARGE MEDS RECONCILED W/ CURRENT OUTPATIENT MED LIST    4. Intractable abdominal pain  Abdominal pain has resolved  - [place] DISCHARGE MEDS RECONCILED W/ CURRENT OUTPATIENT MED LIST    5.  Lumbosacral spondylosis without myelopathy  Continue same medications  - [place] DISCHARGE MEDS RECONCILED W/ CURRENT OUTPATIENT MED LIST    6. Need for prophylactic vaccination against diphtheria-tetanus-pertussis (DTP)    - Tdap (ADACEL) 5-2-15.5 LF-MCG/0.5 injection;  Inject 0.5 mLs into the muscle once for 1 dose  Dispense: 0.5 mL; Refill: 0        Medical Decision Making: moderate complexity

## 2019-05-12 RX ORDER — CYCLOBENZAPRINE HCL 10 MG
10 TABLET ORAL 2 TIMES DAILY PRN
Qty: 30 TABLET | Refills: 0 | Status: SHIPPED | OUTPATIENT
Start: 2019-05-12 | End: 2019-05-26

## 2019-05-13 DIAGNOSIS — E11.42 TYPE 2 DIABETES MELLITUS WITH DIABETIC POLYNEUROPATHY, WITH LONG-TERM CURRENT USE OF INSULIN (HCC): Chronic | ICD-10-CM

## 2019-05-13 DIAGNOSIS — Z79.4 TYPE 2 DIABETES MELLITUS WITH DIABETIC POLYNEUROPATHY, WITH LONG-TERM CURRENT USE OF INSULIN (HCC): Chronic | ICD-10-CM

## 2019-05-14 ENCOUNTER — TELEPHONE (OUTPATIENT)
Dept: FAMILY MEDICINE CLINIC | Age: 69
End: 2019-05-14

## 2019-05-15 ENCOUNTER — TELEPHONE (OUTPATIENT)
Dept: FAMILY MEDICINE CLINIC | Age: 69
End: 2019-05-15

## 2019-05-16 ENCOUNTER — HOSPITAL ENCOUNTER (OUTPATIENT)
Dept: PAIN MANAGEMENT | Age: 69
Discharge: HOME | End: 2019-05-16
Payer: COMMERCIAL

## 2019-05-16 VITALS
OXYGEN SATURATION: 94 % | TEMPERATURE: 98.1 F | RESPIRATION RATE: 16 BRPM | DIASTOLIC BLOOD PRESSURE: 64 MMHG | BODY MASS INDEX: 46.38 KG/M2 | HEART RATE: 86 BPM | WEIGHT: 252 LBS | HEIGHT: 62 IN | SYSTOLIC BLOOD PRESSURE: 138 MMHG

## 2019-05-16 DIAGNOSIS — G89.29 CHRONIC PAIN OF LEFT KNEE: ICD-10-CM

## 2019-05-16 DIAGNOSIS — M51.37 DEGENERATION OF LUMBAR OR LUMBOSACRAL INTERVERTEBRAL DISC: ICD-10-CM

## 2019-05-16 DIAGNOSIS — M46.1 SACROILIITIS, NOT ELSEWHERE CLASSIFIED (HCC): Chronic | ICD-10-CM

## 2019-05-16 DIAGNOSIS — M54.40 CHRONIC BILATERAL LOW BACK PAIN WITH SCIATICA, SCIATICA LATERALITY UNSPECIFIED: ICD-10-CM

## 2019-05-16 DIAGNOSIS — M47.812 OSTEOARTHRITIS OF CERVICAL SPINE, UNSPECIFIED SPINAL OSTEOARTHRITIS COMPLICATION STATUS: ICD-10-CM

## 2019-05-16 DIAGNOSIS — G43.709 CHRONIC MIGRAINE WITHOUT AURA WITHOUT STATUS MIGRAINOSUS, NOT INTRACTABLE: ICD-10-CM

## 2019-05-16 DIAGNOSIS — M25.562 CHRONIC PAIN OF LEFT KNEE: ICD-10-CM

## 2019-05-16 DIAGNOSIS — G89.29 CHRONIC BILATERAL LOW BACK PAIN WITH SCIATICA, SCIATICA LATERALITY UNSPECIFIED: ICD-10-CM

## 2019-05-16 DIAGNOSIS — G43.009 NONINTRACTABLE MIGRAINE, UNSPECIFIED MIGRAINE TYPE: ICD-10-CM

## 2019-05-16 DIAGNOSIS — M47.892 OTHER OSTEOARTHRITIS OF SPINE, CERVICAL REGION: Chronic | ICD-10-CM

## 2019-05-16 DIAGNOSIS — M50.30 DEGENERATIVE CERVICAL DISC: ICD-10-CM

## 2019-05-16 DIAGNOSIS — E66.01 OBESITY, MORBID, BMI 40.0-49.9 (HCC): ICD-10-CM

## 2019-05-16 DIAGNOSIS — Z51.81 MEDICATION MONITORING ENCOUNTER: ICD-10-CM

## 2019-05-16 DIAGNOSIS — M54.16 LUMBAR RADICULOPATHY, CHRONIC: ICD-10-CM

## 2019-05-16 DIAGNOSIS — G62.9 NEUROPATHY: ICD-10-CM

## 2019-05-16 DIAGNOSIS — M51.36 DDD (DEGENERATIVE DISC DISEASE), LUMBAR: ICD-10-CM

## 2019-05-16 DIAGNOSIS — M54.81 BILATERAL OCCIPITAL NEURALGIA: Chronic | ICD-10-CM

## 2019-05-16 DIAGNOSIS — M47.817 LUMBOSACRAL SPONDYLOSIS WITHOUT MYELOPATHY: Chronic | ICD-10-CM

## 2019-05-16 DIAGNOSIS — M50.30 DDD (DEGENERATIVE DISC DISEASE), CERVICAL: Primary | Chronic | ICD-10-CM

## 2019-05-16 PROCEDURE — 99213 OFFICE O/P EST LOW 20 MIN: CPT | Performed by: NURSE PRACTITIONER

## 2019-05-16 PROCEDURE — 99213 OFFICE O/P EST LOW 20 MIN: CPT

## 2019-05-16 RX ORDER — OXYCODONE HYDROCHLORIDE AND ACETAMINOPHEN 5; 325 MG/1; MG/1
1 TABLET ORAL EVERY 6 HOURS PRN
Qty: 100 TABLET | Refills: 0 | Status: SHIPPED | OUTPATIENT
Start: 2019-05-18 | End: 2019-06-13 | Stop reason: SDUPTHER

## 2019-05-16 ASSESSMENT — ENCOUNTER SYMPTOMS
BACK PAIN: 1
GASTROINTESTINAL NEGATIVE: 1

## 2019-05-16 NOTE — PROGRESS NOTES
Snehal 88 PROGRESS NOTE      Patient here today to review Medication Agreement    Chief Complaint: low back pain      HPI: She c/o low back pain which has not changed. No history lumbar surgery. PT helped in the past. She uses cpap with oxygen at night , sleep is good. Activity wise, she does home exercises. She has an aide. No Ed visits. Back Pain   This is a chronic problem. The current episode started more than 1 year ago. The problem occurs constantly. The problem is unchanged. The pain is present in the lumbar spine. Quality: throbbing. The pain does not radiate. The pain is at a severity of 7/10. The pain is moderate. Worse during: evening. Exacerbated by: nothing. Associated symptoms include numbness. Risk factors include menopause, obesity and sedentary lifestyle. She has tried analgesics, muscle relaxant, bed rest and heat for the symptoms. The treatment provided mild relief. Patient denies any new neurological symptoms. No bowel or bladder incontinence, no weakness, and no falling. Treatment goals:reduce pain 5  Functional status:       Aberrancy:   Any alcoholic beverages   no    Any illegal drugs   no      Analgesia: pain 7      Adverse  Effects :BM daily    ADL;s :home exercises,        Pill count: appropriate # 9 percocet    Morphine equivalent dose as reported on OARRS:30  Attestation: The Prescription Monitoring Report for this patient was reviewed today. VERONIKA Hull CNP)  Chronic Pain Routine Monitoring: No signs of potential drug abuse or diversion identified: otherwise, see note documentation, Obtaining appropriate analgesic effect of treatment. (VERONIKA Hull CNP)  Chronic Pain > 80 MEDD: Obtained or confirmed a written medication contract was on file. VERONIKA Hull CNP)  Review ofOARRS does not show any aberrant prescription behavior. Medication is helping the patient stay active.  Patient denies any side effects and reports adequate analgesia. No sign of misuse/abuse.               When was the last UDS:  6-8-18          Was the UDS appropriate:yes        Record/Diagnostics Review:       As above, I did review the imaging     Record/Diagnostics Review:       As above, I did review the imaging     6/13/2018  3:38 PM - Carmelo, Kellypn Incoming Lab Results From [facility]      Component Results      Component Value Ref Range & Units Status Collected Lab   Pain Management Drug Panel Interp, Urine Consistent    Final 06/08/2018  9:45 AM [facility]   (NOTE)   ________________________________________________________________   DRUGS EXPECTED:   OXYCODONE [6/3/18]   ________________________________________________________________   CONSISTENT with medications provided:   OXYCODONE : based on the absence of oxycodone and metabolites   ________________________________________________________________   INTERPRETIVE INFORMATION: Pain Mgt [place], [place] Spec/EMIT, Ur,                            Interp   Interpretation depends on accuracy and completeness of patient   medication information submitted by client. 6-Acetylmorphine, Ur Not Detected    Final 06/08/2018  9:45 AM [facility]   7-Aminoclonazepam, Urine Not Detected    Final 06/08/2018  9:45 AM [facility]   Alpha-OH-Alpraz, Urine Not Detected    Final 06/08/2018  9:45 AM [facility]   Alprazolam, Urine Not Detected    Final 06/08/2018  9:45 AM [facility]   Amphetamines, urine Not Detected    Final 06/08/2018  9:45 AM [facility]   Barbiturates, Ur Not Detected    Final 06/08/2018  9:45 AM [facility]   Benzoylecgonine, Ur Not Detected    Final 06/08/2018  9:45 AM [facility]   Buprenorphine Urine Not Detected    Final 06/08/2018  9:45 AM [facility]   Carisoprodol, Ur Not Detected    Final 06/08/2018  9:45 AM [facility]   (NOTE)   The carisoprodol immunoassay has cross-reactivity to carisoprodol   and meprobamate.     Clonazepam, Urine Not Detected    Final 06/08/2018  9:45 AM [facility]   Codeine, Urine Not Detected    Final 06/08/2018  9:45 AM [facility]   MDA, Ur Not Detected   Final 06/08/2018  9:45 AM [facility]   Diazepam, Urine Not Detected    Final 06/08/2018  9:45 AM [facility]   Ethyl Glucuronide Ur Not Detected    Final 06/08/2018  9:45 AM [facility]   Fentanyl, Ur Not Detected    Final 06/08/2018  9:45 AM [facility]   Hydrocodone, Urine Not Detected    Final 06/08/2018  9:45 AM [facility]   Hydromorphone, Urine Not Detected    Final 06/08/2018  9:45 AM [facility]   Lorazepam, Urine Not Detected    Final 06/08/2018  9:45 AM [facility]   Marijuana Metab, Ur Not Detected    Final 06/08/2018  9:45 AM [facility]   MDEA, ALMA, Ur Not Detected    Final 06/08/2018  9:45 AM [facility]   MDMA, Urine Not Detected    Final 06/08/2018  9:45 AM [facility]   Meperidine Metab, Ur Not Detected    Final 06/08/2018  9:45 AM [facility]   Methadone, Urine Not Detected    Final 06/08/2018  9:45 AM [facility]   Methamphetamine, Urine Not Detected    Final 06/08/2018  9:45 AM [facility]   Methylphenidate Not Detected    Final 06/08/2018  9:45 AM [facility]   Midazolam, Urine Not Detected    Final 06/08/2018  9:45 AM [facility]   Morphine Urine Not Detected    Final 06/08/2018  9:45 AM [facility]   Norbuprenorphine, Urine Not Detected    Final 06/08/2018  9:45 AM [facility]   Nordiazepam, Urine Not Detected    Final 06/08/2018  9:45 AM [facility]   Norfentanyl, Urine Not Detected    Final 06/08/2018  9:45 AM [facility]   NORHYDROCODONE, URINE Not Detected    Final 06/08/2018  9:45 AM [facility]   Noroxycodone, Urine Not Detected    Final 06/08/2018  9:45 AM [facility]   NOROXYMORPHONE, URINE Not Detected    Final 06/08/2018  9:45 AM [facility]   Oxazepam, Urine Not Detected    Final 06/08/2018  9:45 AM [facility]   Oxycodone Urine Not Detected    Final 06/08/2018  9:45 AM [facility]   Oxymorphone, Urine Not Detected    Final 06/08/2018  9:45 AM [facility]   PCP, Urine Not Detected    Final 06/08/2018  9:45 AM [facility]   Phentermine, Ur Not Detected    Final 06/08/2018  9:45 AM [facility]   Propoxyphene, Urine Not Detected    Final 06/08/2018  9:45 AM [facility]   Tapentadol-O-Sulfate, Urine Not Detected    Final 06/08/2018  9:45 AM [facility]   Tapentadol, Urine Not Detected    Final 06/08/2018  9:45 AM [facility]   Temazepam, Urine Not Detected    Final 06/08/2018  9:45 AM [facility]   Tramadol, Urine Not Detected    Final 06/08/2018  9:45 AM [facility]   Zolpidem, Urine Not Detected    Final 06/08/2018  9:45 AM [facility]   Drugs Expected, Ur OXYCODONE ON 6/3/18    Final 06/08/2018  9:45 AM [facility] [address] [facility]   Creatinine, Ur 197.8  20.0 - 400.0 mg/dL Final 06/08/2018  9:45 AM [facility]   Pain Mgt Drug Panel, Hi Res, Ur See Below    Final 06/08/2018  9:45 AM [facility]   (NOTE)   Methodology: Qualitative Enzyme Immunoassay and Qualitative Liquid   Chromatography-Time of Flight-Mass Spectrometry or Tandem Mass   Spectrometry, Quantitative Spectrophotometry   The absence of expected drug(s) and/or drug metabolite(s) may   indicate non-compliance, inappropriate timing of specimen   collection relative to drug administration, poor drug absorption,   diluted/adulterated urine, or limitations of testing. The   concentration must be greater than or equal to the cutoff to be   reported as present.  If specific drug concentrations are   required, contact the laboratory within two weeks of specimen   collection to request quantification by a second analytical   technique. Interpretive questions should be directed to the   laboratory. Results based on immunoassay detection that do not match clinical   expectations should be   interpreted with caution. Confirmatory testing by mass   spectrometry for immunoassay-based results is available, if   ordered within two weeks of specimen collection. Additional   charges apply. For medical purposes only; not valid for forensic use. This test was developed and its performance characteristics   determined by [facility]. The U.S. Food and Drug   Administration has not approved or cleared this test; however, FDA   clearance or approval is not currently required for clinical use.    The results are not intended to be used as the sole means for   clinical diagnosis or patient management decisions. EER Hi Res Interp Ur See Note    Final 06/08/2018  9:45 AM [facility]   (NOTE)   Access [facility] Enhanced Report using either link below:   -Direct access: [email or web address]   -Enter Username, Password: [email or web address]   Username: [email or web address]   Password: [email or web address]   Performed by [place] [address], [address] [facility] [phone]   www. Sander Pizarro MD, Lab. Director   Performed at [facility]: ANA DUTTA [address] [place] [address] [phone]                       Past Medical History:   Diagnosis Date    Allergic rhinitis     Anxiety 7/17/2013    Arthritis     Asthma     Atrial fibrillation (HCC)     Back pain     NERVE/DR. GRACIA    CAD (coronary artery disease) 3/21/2013    Caffeine use     2 coffee/day    CHF (congestive heart failure) (HCC)     Chronic kidney disease     COPD (chronic obstructive pulmonary disease) (HCC)     emphysema    Degeneration of lumbar or lumbosacral intervertebral disc     Depression     DM (diabetes mellitus) (HCC)     Emphysema of lung (HCC)     Gastritis     GERD (gastroesophageal reflux disease)     Hearing loss     Hematuria     Hiatal hernia     HTN (hypertension), benign 6/28/2014    Hypertriglyceridemia     Incontinence of urine 9/25/2013    Knee arthropathy     Lumbosacral spondylosis without myelopathy 9/29/2014    Migraine headache     DR. GRACIA    Mumps     Neuropathy     Obesity     On home oxygen therapy     2 L PER NC  HS AND PRN    HIGINIO on CPAP     Otitis media 1-26-15    Stroke (HCC)     QUESTIONABLE    Tinnitus     Type 2 diabetes mellitus without complication (HCC)     Type II or unspecified type diabetes mellitus without mention of complication, not stated as uncontrolled     UTI (lower urinary tract infection)     Varicose vein        Past Surgical History:   Procedure Laterality Date    ABLATION OF DYSRHYTHMIC FOCUS  2000    CARPAL TUNNEL RELEASE Right     CATARACT REMOVAL WITH IMPLANT Bilateral     CHOLECYSTECTOMY  2007    COLONOSCOPY      COLONOSCOPY  04/10/2017    tubular adenomo polyp , mod. sigmoid diverticulosis, min. int. hemorrhoids    CYSTOSCOPY  9/25/2013    DILATION AND CURETTAGE  1979    EYE SURGERY      laser    INCONTINENCE SURGERY      Percutaneous nerve stimulation Dr Mayelin Amaya 2013     INSERTABLE CARDIAC MONITOR  12/04/2015    [employer] REVEAL LINQ MODEL #[id number] MRI CONDTIONAL OK 3T. IMMEDIATELY POST IMPLANT    OTHER SURGICAL HISTORY  09/10/15    removal of interstim    [place] COLSC FLX W/REMOVAL LESION BY HOT BX FORCEPS N/A 4/10/2017    COLONOSCOPY POLYPECTOMY HOT BIOPSY performed by Ana Flanagan MD at [address] [place]      TYMPANOPLASTY      TYMPANOPLASTY      TYMPANOSTOMY TUBE PLACEMENT  08/21/2017    UPPER GASTROINTESTINAL ENDOSCOPY  03/2016    Dr Anurag Mckeon       Allergies   Allergen Reactions    Robitussin [Guaifenesin] Anaphylaxis    Codeine Swelling    Compazine [Prochlorperazine Maleate] Hives and Swelling    Iodides Itching    Morphine Other (See Comments)     hallucinations    Moxifloxacin      Other reaction(s): Intolerance-unknown  Other reaction(s): Intolerance-unknown    Reglan [Metoclopramide] Nausea Only    Avelox [Moxifloxacin Hcl In Nacl] Rash     Dermatitis      Cipro Xr Rash     dermatitis    Sulfa Antibiotics Rash         Current Outpatient Medications:     [START ON 5/18/2019] oxyCODONE-acetaminophen (PERCOCET) 5-325 MG per tablet, Take 1 tablet by mouth every 6 hours as needed for Pain for up to 30 days. Intended supply: 30 days. , Disp: 100 tablet, Rfl: 0    carvedilol (COREG) 3.125 MG tablet, TAKE 1 TAB BY MOUTH TWICE A DAY ( AM AND BEDTIME ), Disp: 60 tablet, Rfl: 0    metFORMIN (GLUCOPHAGE) 1000 MG tablet, TAKE 1 TAB BY MOUTH TWICE A DAY ( AM AND BEDTIME ), Disp: 60 tablet, Rfl: 0    magnesium oxide (MAG-OX) 400 MG tablet, TAKE 1 TAB BY MOUTH Rfl: 3    Alcohol Swabs (B-D SINGLE USE SWABS REGULAR) PADS, THREE TIMES A DAY, Disp: 100 each, Rfl: 0    ULTICARE MINI PEN NEEDLES 31G X 6 MM MISC, USE AS DIRECTED, Disp: 100 each, Rfl: 0    furosemide (LASIX) 20 MG tablet, Take 1 tablet by mouth daily as needed (weight gain 3 lbs overnight), Disp: 30 tablet, Rfl: 3    Lift Chair MISC, by Does not apply route Use daily at home to help with ADL's, Disp: 1 each, Rfl: 0    nitroGLYCERIN (NITROSTAT) 0.4 MG SL tablet, 1 under the tongue as needed for angina, may repeat q5mins for up three doses, Disp: , Rfl:     zoster recombinant adjuvanted vaccine (SHINGRIX) 50 MCG SUSR injection, 50 MCG IM then repeat 2-6 months., Disp: 0.5 mL, Rfl: 1    Blood Glucose Monitoring Suppl HARMEET, Use daily to check BS, Disp: 1 Device, Rfl: 0    Melatonin 10 MG TABS, Take 10 mg by mouth nightly, Disp: 90 tablet, Rfl: 3    Compression Stockings MISC, by Does not apply route Pressure between 20 - 25 ., Disp: 1 each, Rfl: 0    ULTICARE SHORT PEN NEEDLES 31G X 8 MM MISC, AS DIRECTED, Disp: 100 each, Rfl: 5    docusate sodium (COLACE) 100 MG capsule, Take 1 capsule by mouth 2 times daily Please use while taking Percocet, Disp: 30 capsule, Rfl: 0    SYMBICORT 160-4.5 MCG/ACT AERO,  2 puffs As needed for sob, Disp: , Rfl:     OXYGEN, Inhale 3 L into the lungs , Disp: , Rfl:     Family History   Problem Relation Age of Onset    Diabetes Mother     Heart Disease Mother     Stomach Cancer Father     Diabetes Maternal Grandmother         also aunts and uncles (maternal)    Emphysema Sister        Social History     Socioeconomic History    Marital status: Legally      Spouse name: Not on file    Number of children: Not on file    Years of education: Not on file    Highest education level: Not on file   Occupational History    Occupation: disabled     Employer: N/A   Social Needs    Financial resource strain: Not on file    Food insecurity:     Worry: Not on file pain of left knee    Relevant Medications    oxyCODONE-acetaminophen (PERCOCET) 5-325 MG per tablet (Start on 5/18/2019)    Chronic migraine without aura without status migrainosus, not intractable    Relevant Medications    oxyCODONE-acetaminophen (PERCOCET) 5-325 MG per tablet (Start on 5/18/2019)    Bilateral low back pain with sciatica    Relevant Medications    oxyCODONE-acetaminophen (PERCOCET) 5-325 MG per tablet (Start on 5/18/2019)      Other Visit Diagnoses     Degeneration of lumbar or lumbosacral intervertebral disc        Relevant Medications    oxyCODONE-acetaminophen (PERCOCET) 5-325 MG per tablet (Start on 5/18/2019)    Degenerative cervical disc        Relevant Medications    oxyCODONE-acetaminophen (PERCOCET) 5-325 MG per tablet (Start on 5/18/2019)    Lumbar radiculopathy, chronic        Relevant Medications    oxyCODONE-acetaminophen (PERCOCET) 5-325 MG per tablet (Start on 5/18/2019)            Treatment Plan:  DISCUSSION: Treatment options discussed withpatient and all questions answered to patient's satisfaction. Possible side effects, risk of tolerance and or dependence and alternative treatments discussed    Obtaining appropriate analgesic effect of treatment   No signs of potential drug abuse or diversion identified    [x] Ill effects of being on chronic pain medications such as sleep disturbances, respiratory depression, hormonal changes, withdrawal symptoms, chronic opioid dependence and tolerance as well as risk of taking opioids with Benzodiazepines and taking opioids along with alcohol,  werediscussed with patient. I had asked the patient to minimize medication use and utilize pain medications only for uncontrolled rest pain or pain with exertional activities. I advised patient not to self-escalate painmedications without consulting with us.   At each of patient's future visits we will try to taper pain medications, while adjusting the adjunct medications, and re-evaluating for Physical Therapy to improve spinal andjoint strength. We will continue to have discussions to decrease pain medications as tolerated. Counseled patient on effects their pain medication and /or their medical condition mayhave on their  ability to drive or operate machinery. Instructed not to drive or operate machinery if drowsy     I also discussed with the patient regarding the dangers of combining narcotic pain medication with tranquilizers, alcohol or illegal drugs or taking the medication any way other than prescribed. The dangers were discussed  including respiratory depression and death. Patient was told to tell  all  physicians regarding the medications he is getting from pain clinic. Patient is warned not to take any unprescribed medications over-the-countermedications that can depress breathing . Patient is advised to talk to the pharmacist or physicians if planning to take any over-the-counter medications before  takeing them. Patient is strongly advised to avoid tranquilizers or  relaxants, illegal drugs  or any medications that can depress breathing  Patient is also advised to tell us if there is any changes in their medications from other physicians.         TREATMENT OPTIONS:     Return in 4 weeks  Medication Agreement Requirements Met  Continue Opioid therapy  Script written for  percocet  tFollow up appointment made

## 2019-05-17 DIAGNOSIS — Z79.4 TYPE 2 DIABETES MELLITUS WITH DIABETIC POLYNEUROPATHY, WITH LONG-TERM CURRENT USE OF INSULIN (HCC): Chronic | ICD-10-CM

## 2019-05-17 DIAGNOSIS — D64.9 ANEMIA, UNSPECIFIED TYPE: ICD-10-CM

## 2019-05-17 DIAGNOSIS — E83.42 HYPOMAGNESEMIA: ICD-10-CM

## 2019-05-17 DIAGNOSIS — I10 HTN (HYPERTENSION), BENIGN: Chronic | ICD-10-CM

## 2019-05-17 DIAGNOSIS — E11.42 TYPE 2 DIABETES MELLITUS WITH DIABETIC POLYNEUROPATHY, WITH LONG-TERM CURRENT USE OF INSULIN (HCC): Chronic | ICD-10-CM

## 2019-05-17 RX ORDER — CARVEDILOL 3.12 MG/1
TABLET ORAL
Qty: 60 TABLET | Refills: 0 | Status: SHIPPED | OUTPATIENT
Start: 2019-05-17 | End: 2019-06-20 | Stop reason: SDUPTHER

## 2019-05-17 RX ORDER — FERROUS SULFATE 325(65) MG
TABLET ORAL
Qty: 30 TABLET | Refills: 0 | Status: SHIPPED | OUTPATIENT
Start: 2019-05-17 | End: 2019-06-20 | Stop reason: SDUPTHER

## 2019-05-17 RX ORDER — MAGNESIUM OXIDE 400 MG/1
TABLET ORAL
Qty: 60 TABLET | Refills: 0 | Status: SHIPPED | OUTPATIENT
Start: 2019-05-17 | End: 2019-06-20 | Stop reason: SDUPTHER

## 2019-05-17 NOTE — TELEPHONE ENCOUNTER
Please Approve or Refuse.   Send to Pharmacy per Pt's Request:      Next Visit Date:  7/30/2019   Last Visit Date: 4/24/2019    Hemoglobin A1C (%)   Date Value   04/24/2019 6.6   01/09/2019 6.9 (H)   09/25/2018 7.0 (H)             ( goal A1C is < 7)   BP Readings from Last 3 Encounters:   05/16/19 138/64   04/24/19 103/71   04/15/19 (!) 98/53          (goal 120/80)  BUN   Date Value Ref Range Status   03/14/2019 19 8 - 23 mg/dL Final     CREATININE   Date Value Ref Range Status   03/14/2019 0.92 (H) 0.50 - 0.90 mg/dL Final     Potassium   Date Value Ref Range Status   03/14/2019 4.4 3.7 - 5.3 mmol/L Final

## 2019-06-13 ENCOUNTER — HOSPITAL ENCOUNTER (OUTPATIENT)
Dept: PAIN MANAGEMENT | Age: 69
Discharge: HOME | End: 2019-06-13
Payer: COMMERCIAL

## 2019-06-13 ENCOUNTER — APPOINTMENT (OUTPATIENT)
Dept: GENERAL RADIOLOGY | Age: 69
DRG: 313 | End: 2019-06-13
Payer: COMMERCIAL

## 2019-06-13 ENCOUNTER — APPOINTMENT (OUTPATIENT)
Dept: CT IMAGING | Age: 69
DRG: 313 | End: 2019-06-13
Payer: COMMERCIAL

## 2019-06-13 ENCOUNTER — HOSPITAL ENCOUNTER (INPATIENT)
Age: 69
LOS: 2 days | Discharge: HOME HEALTH | DRG: 313 | End: 2019-06-17
Attending: EMERGENCY MEDICINE | Admitting: EMERGENCY MEDICINE
Payer: COMMERCIAL

## 2019-06-13 VITALS
DIASTOLIC BLOOD PRESSURE: 64 MMHG | RESPIRATION RATE: 16 BRPM | WEIGHT: 252 LBS | TEMPERATURE: 98.3 F | HEIGHT: 62 IN | BODY MASS INDEX: 46.38 KG/M2 | HEART RATE: 78 BPM | OXYGEN SATURATION: 93 % | SYSTOLIC BLOOD PRESSURE: 131 MMHG

## 2019-06-13 DIAGNOSIS — G43.709 CHRONIC MIGRAINE WITHOUT AURA WITHOUT STATUS MIGRAINOSUS, NOT INTRACTABLE: ICD-10-CM

## 2019-06-13 DIAGNOSIS — M51.36 DDD (DEGENERATIVE DISC DISEASE), LUMBAR: ICD-10-CM

## 2019-06-13 DIAGNOSIS — M46.1 SACROILIITIS, NOT ELSEWHERE CLASSIFIED (HCC): Chronic | ICD-10-CM

## 2019-06-13 DIAGNOSIS — M25.562 CHRONIC PAIN OF LEFT KNEE: ICD-10-CM

## 2019-06-13 DIAGNOSIS — G89.29 CHRONIC PAIN OF LEFT KNEE: ICD-10-CM

## 2019-06-13 DIAGNOSIS — G89.29 CHRONIC BILATERAL LOW BACK PAIN WITH SCIATICA, SCIATICA LATERALITY UNSPECIFIED: ICD-10-CM

## 2019-06-13 DIAGNOSIS — M47.817 LUMBOSACRAL SPONDYLOSIS WITHOUT MYELOPATHY: ICD-10-CM

## 2019-06-13 DIAGNOSIS — M54.40 CHRONIC BILATERAL LOW BACK PAIN WITH SCIATICA, SCIATICA LATERALITY UNSPECIFIED: ICD-10-CM

## 2019-06-13 DIAGNOSIS — G43.119 INTRACTABLE MIGRAINE WITH AURA WITHOUT STATUS MIGRAINOSUS: ICD-10-CM

## 2019-06-13 DIAGNOSIS — M54.81 BILATERAL OCCIPITAL NEURALGIA: ICD-10-CM

## 2019-06-13 DIAGNOSIS — M47.9 SPONDYLARTHROSIS: ICD-10-CM

## 2019-06-13 DIAGNOSIS — R07.9 CHEST PAIN, UNSPECIFIED TYPE: Primary | ICD-10-CM

## 2019-06-13 DIAGNOSIS — Z51.81 MEDICATION MONITORING ENCOUNTER: Chronic | ICD-10-CM

## 2019-06-13 DIAGNOSIS — M50.30 DDD (DEGENERATIVE DISC DISEASE), CERVICAL: Primary | Chronic | ICD-10-CM

## 2019-06-13 DIAGNOSIS — M54.16 LUMBAR RADICULOPATHY, CHRONIC: ICD-10-CM

## 2019-06-13 DIAGNOSIS — M47.892 OTHER OSTEOARTHRITIS OF SPINE, CERVICAL REGION: Chronic | ICD-10-CM

## 2019-06-13 LAB
ABSOLUTE EOS #: 0.18 K/UL (ref 0–0.44)
ABSOLUTE IMMATURE GRANULOCYTE: 0.04 K/UL (ref 0–0.3)
ABSOLUTE LYMPH #: 2.6 K/UL (ref 1.1–3.7)
ABSOLUTE MONO #: 0.77 K/UL (ref 0.1–1.2)
ALBUMIN SERPL-MCNC: 4.3 G/DL (ref 3.5–5.2)
ALBUMIN/GLOBULIN RATIO: 1.3 (ref 1–2.5)
ALP BLD-CCNC: 65 U/L (ref 35–104)
ALT SERPL-CCNC: 19 U/L (ref 5–33)
ANION GAP SERPL CALCULATED.3IONS-SCNC: 14 MMOL/L (ref 9–17)
AST SERPL-CCNC: 15 U/L
BASOPHILS # BLD: 1 % (ref 0–2)
BASOPHILS ABSOLUTE: 0.07 K/UL (ref 0–0.2)
BILIRUB SERPL-MCNC: 0.21 MG/DL (ref 0.3–1.2)
BUN BLDV-MCNC: 22 MG/DL (ref 8–23)
BUN/CREAT BLD: ABNORMAL (ref 9–20)
CALCIUM SERPL-MCNC: 9.8 MG/DL (ref 8.6–10.4)
CHLORIDE BLD-SCNC: 97 MMOL/L (ref 98–107)
CO2: 27 MMOL/L (ref 20–31)
CREAT SERPL-MCNC: 1.11 MG/DL (ref 0.5–0.9)
D-DIMER QUANTITATIVE: 0.61 MG/L FEU
DIFFERENTIAL TYPE: ABNORMAL
EOSINOPHILS RELATIVE PERCENT: 2 % (ref 1–4)
GFR AFRICAN AMERICAN: 59 ML/MIN
GFR NON-AFRICAN AMERICAN: 49 ML/MIN
GFR SERPL CREATININE-BSD FRML MDRD: ABNORMAL ML/MIN/{1.73_M2}
GFR SERPL CREATININE-BSD FRML MDRD: ABNORMAL ML/MIN/{1.73_M2}
GLUCOSE BLD-MCNC: 174 MG/DL (ref 65–105)
GLUCOSE BLD-MCNC: 87 MG/DL (ref 70–99)
HCT VFR BLD CALC: 42.7 % (ref 36.3–47.1)
HEMOGLOBIN: 13.6 G/DL (ref 11.9–15.1)
IMMATURE GRANULOCYTES: 1 %
LIPASE: 49 U/L (ref 13–60)
LYMPHOCYTES # BLD: 30 % (ref 24–43)
MCH RBC QN AUTO: 30.1 PG (ref 25.2–33.5)
MCHC RBC AUTO-ENTMCNC: 31.9 G/DL (ref 28.4–34.8)
MCV RBC AUTO: 94.5 FL (ref 82.6–102.9)
MONOCYTES # BLD: 9 % (ref 3–12)
NRBC AUTOMATED: 0 PER 100 WBC
PDW BLD-RTO: 13.2 % (ref 11.8–14.4)
PLATELET # BLD: 238 K/UL (ref 138–453)
PLATELET ESTIMATE: ABNORMAL
PMV BLD AUTO: 11.4 FL (ref 8.1–13.5)
POTASSIUM SERPL-SCNC: 4.2 MMOL/L (ref 3.7–5.3)
RBC # BLD: 4.52 M/UL (ref 3.95–5.11)
RBC # BLD: ABNORMAL 10*6/UL
SEG NEUTROPHILS: 57 % (ref 36–65)
SEGMENTED NEUTROPHILS ABSOLUTE COUNT: 5.04 K/UL (ref 1.5–8.1)
SODIUM BLD-SCNC: 138 MMOL/L (ref 135–144)
TOTAL PROTEIN: 7.6 G/DL (ref 6.4–8.3)
TROPONIN INTERP: NORMAL
TROPONIN T: NORMAL NG/ML
TROPONIN, HIGH SENSITIVITY: 7 NG/L (ref 0–14)
WBC # BLD: 8.7 K/UL (ref 3.5–11.3)
WBC # BLD: ABNORMAL 10*3/UL

## 2019-06-13 PROCEDURE — 71046 X-RAY EXAM CHEST 2 VIEWS: CPT

## 2019-06-13 PROCEDURE — 85379 FIBRIN DEGRADATION QUANT: CPT

## 2019-06-13 PROCEDURE — 96374 THER/PROPH/DIAG INJ IV PUSH: CPT

## 2019-06-13 PROCEDURE — 99285 EMERGENCY DEPT VISIT HI MDM: CPT

## 2019-06-13 PROCEDURE — 93005 ELECTROCARDIOGRAM TRACING: CPT | Performed by: EMERGENCY MEDICINE

## 2019-06-13 PROCEDURE — 84484 ASSAY OF TROPONIN QUANT: CPT

## 2019-06-13 PROCEDURE — 36415 COLL VENOUS BLD VENIPUNCTURE: CPT

## 2019-06-13 PROCEDURE — 83690 ASSAY OF LIPASE: CPT

## 2019-06-13 PROCEDURE — 80053 COMPREHEN METABOLIC PANEL: CPT

## 2019-06-13 PROCEDURE — 85025 COMPLETE CBC W/AUTO DIFF WBC: CPT

## 2019-06-13 PROCEDURE — 6360000002 HC RX W HCPCS: Performed by: EMERGENCY MEDICINE

## 2019-06-13 PROCEDURE — 99213 OFFICE O/P EST LOW 20 MIN: CPT

## 2019-06-13 PROCEDURE — 93005 ELECTROCARDIOGRAM TRACING: CPT

## 2019-06-13 PROCEDURE — 82947 ASSAY GLUCOSE BLOOD QUANT: CPT

## 2019-06-13 PROCEDURE — 99213 OFFICE O/P EST LOW 20 MIN: CPT | Performed by: NURSE PRACTITIONER

## 2019-06-13 PROCEDURE — 96375 TX/PRO/DX INJ NEW DRUG ADDON: CPT

## 2019-06-13 PROCEDURE — 6370000000 HC RX 637 (ALT 250 FOR IP): Performed by: EMERGENCY MEDICINE

## 2019-06-13 RX ORDER — PREDNISONE 20 MG/1
50 TABLET ORAL EVERY 6 HOURS
Status: COMPLETED | OUTPATIENT
Start: 2019-06-13 | End: 2019-06-13

## 2019-06-13 RX ORDER — OXYCODONE HYDROCHLORIDE AND ACETAMINOPHEN 5; 325 MG/1; MG/1
1 TABLET ORAL SEE ADMIN INSTRUCTIONS
Qty: 100 TABLET | Refills: 0 | Status: SHIPPED | OUTPATIENT
Start: 2019-06-17 | End: 2019-07-16 | Stop reason: SDUPTHER

## 2019-06-13 RX ORDER — DIPHENHYDRAMINE HYDROCHLORIDE 50 MG/ML
25 INJECTION INTRAMUSCULAR; INTRAVENOUS ONCE
Status: COMPLETED | OUTPATIENT
Start: 2019-06-13 | End: 2019-06-13

## 2019-06-13 RX ORDER — DIPHENHYDRAMINE HCL 25 MG
50 TABLET ORAL ONCE
Status: COMPLETED | OUTPATIENT
Start: 2019-06-13 | End: 2019-06-13

## 2019-06-13 RX ORDER — PREDNISONE 20 MG/1
60 TABLET ORAL ONCE
Status: COMPLETED | OUTPATIENT
Start: 2019-06-13 | End: 2019-06-13

## 2019-06-13 RX ORDER — FENTANYL CITRATE 50 UG/ML
50 INJECTION, SOLUTION INTRAMUSCULAR; INTRAVENOUS ONCE
Status: COMPLETED | OUTPATIENT
Start: 2019-06-13 | End: 2019-06-13

## 2019-06-13 RX ADMIN — DIPHENHYDRAMINE HCL 50 MG: 25 TABLET ORAL at 20:05

## 2019-06-13 RX ADMIN — FENTANYL CITRATE 50 MCG: 50 INJECTION, SOLUTION INTRAMUSCULAR; INTRAVENOUS at 20:05

## 2019-06-13 RX ADMIN — PREDNISONE 50 MG: 20 TABLET ORAL at 20:05

## 2019-06-13 RX ADMIN — PREDNISONE 60 MG: 20 TABLET ORAL at 22:08

## 2019-06-13 RX ADMIN — DIPHENHYDRAMINE HYDROCHLORIDE 25 MG: 50 INJECTION INTRAMUSCULAR; INTRAVENOUS at 22:08

## 2019-06-13 ASSESSMENT — PAIN DESCRIPTION - ORIENTATION: ORIENTATION: RIGHT

## 2019-06-13 ASSESSMENT — PAIN DESCRIPTION - FREQUENCY: FREQUENCY: CONTINUOUS

## 2019-06-13 ASSESSMENT — ENCOUNTER SYMPTOMS
COLOR CHANGE: 0
VOMITING: 0
ABDOMINAL PAIN: 0
SHORTNESS OF BREATH: 1
COUGH: 0
WHEEZING: 0
BACK PAIN: 1
NAUSEA: 0
RHINORRHEA: 0
GASTROINTESTINAL NEGATIVE: 1

## 2019-06-13 ASSESSMENT — PAIN DESCRIPTION - LOCATION: LOCATION: CHEST

## 2019-06-13 ASSESSMENT — PAIN DESCRIPTION - PROGRESSION: CLINICAL_PROGRESSION: GRADUALLY WORSENING

## 2019-06-13 ASSESSMENT — PAIN DESCRIPTION - ONSET: ONSET: PROGRESSIVE

## 2019-06-13 ASSESSMENT — PAIN DESCRIPTION - PAIN TYPE: TYPE: ACUTE PAIN

## 2019-06-13 ASSESSMENT — PAIN DESCRIPTION - DESCRIPTORS: DESCRIPTORS: DULL

## 2019-06-13 ASSESSMENT — PAIN SCALES - GENERAL: PAINLEVEL_OUTOF10: 9

## 2019-06-13 NOTE — ED NOTES
Awaiting lab results. Pt resting on cart with call light within reach. NAD noted, RR even and NL.  Will continue to monitor     Nery Kohler RN  06/13/19 1900

## 2019-06-13 NOTE — ED PROVIDER NOTES
[address] [facility]     Emergency Department     Faculty Attestation    I performed a history and physical examination of the patient and discussed management with the resident. I reviewed the residents note and agree with the documented findings and plan of care. Any areas of disagreement are noted on the chart. I was personally present for the key portions of any procedures. I have documented in the chart those procedures where I was not present during the key portions. I have reviewed the emergency nurses triage note. I agree with the chief complaint, past medical history, past surgical history, allergies, medications, social and family history as documented unless otherwise noted below. Documentation of the HPI, Physical Exam and Medical Decision Making performed by medical students or scribes is based on my personal performance of the HPI, PE and MDM. For Physician Assistant/ Nurse Practitioner cases/documentation I have personally evaluated this patient and have completed at least one if not all key elements of the E/M (history, physical exam, and MDM). Additional findings are as noted. Vital signs:   Vitals:    06/13/19 1802   BP: 131/63   Pulse: 73   Resp: 20   Temp:    SpO2: 80      39-year-old female presents complaining of pleuritic right-sided chest discomfort that started 3 days ago. Patient describes the pain as dull and aching. She also complains of shortness of breath. The patient states that she does not normally use oxygen at home, but is using oxygen in the room. She does have a history of congestive heart failure, CAD, and COPD. She denies cough. No fever. The patient has no prior history of venous thromboembolism. No recent travel. No recent surgery. No leg swelling. No hemoptysis. No estrogen containing medications. No history of malignancy. On physical exam, she is alert and afebrile. Breath sounds with scattered wheezes bilaterally. No rales or rhonchi. Cardiac exam with a normal rate, regular rhythm. Abdomen is soft and nontender. Extremities no edema or calf tenderness.               Nicola Sher M.D,  Attending Emergency  Physician           Bhaskar Soto MD  06/13/19 [id number]

## 2019-06-13 NOTE — ED NOTES
Pt to ED vis BLS transport for right sided chest pain x3 days. Pt reporting the pain is dull and aching. Pt reporting she was at her doctor this morning and was having the pain but it was not severe enough to bring her into the ER. Pt reporting sob with hx of copd, chf. Pt placed on cardiac monitor, bp cuff, and pulse ox. Pt resting on cart with call light within reach. NAD noted, RR even and NL.  Will continue to monitor     Baylee Durbin RN  06/13/19 [id number]

## 2019-06-13 NOTE — PROGRESS NOTES
appropriate:yes        Record/Diagnostics Review:       As above, I did review the imaging     Record/Diagnostics Review:       As above, I did review the imaging     6/13/2018  3:38 PM - Carmelo, Mhpn Incoming Lab Results From [facility]      Component Results      Component Value Ref Range & Units Status Collected Lab   Pain Management Drug Panel Interp, Urine Consistent    Final 06/08/2018  9:45 AM [facility]   (NOTE)   ________________________________________________________________   DRUGS EXPECTED:   OXYCODONE [6/3/18]   ________________________________________________________________   CONSISTENT with medications provided:   OXYCODONE : based on the absence of oxycodone and metabolites   ________________________________________________________________   INTERPRETIVE INFORMATION: Pain Mgt [place], [place] Spec/EMIT, Ur,                            Interp   Interpretation depends on accuracy and completeness of patient   medication information submitted by client. 6-Acetylmorphine, Ur Not Detected    Final 06/08/2018  9:45 AM [facility]   7-Aminoclonazepam, Urine Not Detected    Final 06/08/2018  9:45 AM [facility]   Alpha-OH-Alpraz, Urine Not Detected    Final 06/08/2018  9:45 AM [facility]   Alprazolam, Urine Not Detected    Final 06/08/2018  9:45 AM [facility]   Amphetamines, urine Not Detected    Final 06/08/2018  9:45 AM [facility]   Barbiturates, Ur Not Detected    Final 06/08/2018  9:45 AM [facility]   Benzoylecgonine, Ur Not Detected    Final 06/08/2018  9:45 AM [facility]   Buprenorphine Urine Not Detected    Final 06/08/2018  9:45 AM [facility]   Carisoprodol, Ur Not Detected    Final 06/08/2018  9:45 AM [facility]   (NOTE)   The carisoprodol immunoassay has cross-reactivity to carisoprodol   and meprobamate.     Clonazepam, Urine Not Detected    Final 06/08/2018  9:45 AM [facility]   Codeine, Urine Not Detected    Final 06/08/2018  9:45 AM [facility]   MDA, Ur Not Detected    Final 06/08/2018  9:45 AM [facility]   Diazepam, Urine Not Detected    Final 06/08/2018  9:45 AM [facility]   Ethyl Glucuronide Ur Not Detected    Final 06/08/2018  9:45 AM [facility]   Fentanyl, Ur Not Detected    Final 06/08/2018  9:45 AM [facility]   Hydrocodone, Urine Not Detected    Final 06/08/2018  9:45 AM [facility]   Hydromorphone, Urine Not Detected    Final 06/08/2018  9:45 AM [facility]   Lorazepam, Urine Not Detected    Final 06/08/2018  9:45 AM [facility]   Marijuana Metab, Ur Not Detected    Final 06/08/2018  9:45 AM [facility]   MDEA, ALMA, Ur Not Detected    Final 06/08/2018  9:45 AM [facility]   MDMA, Urine Not Detected    Final 06/08/2018  9:45 AM [facility]   Meperidine Metab, Ur Not Detected    Final 06/08/2018  9:45 AM [facility]   Methadone, Urine Not Detected    Final 06/08/2018  9:45 AM [facility]   Methamphetamine, Urine Not Detected    Final 06/08/2018  9:45 AM [facility]   Methylphenidate Not Detected    Final 06/08/2018  9:45 AM [facility]   Midazolam, Urine Not Detected    Final 06/08/2018  9:45 AM [facility]   Morphine Urine Not Detected    Final 06/08/2018  9:45 AM [facility]   Norbuprenorphine, Urine Not Detected    Final 06/08/2018  9:45 AM [facility]   Nordiazepam, Urine Not Detected    Final 06/08/2018  9:45 AM [facility]   Norfentanyl, Urine Not Detected    Final 06/08/2018  9:45 AM [facility]   NORHYDROCODONE, URINE Not Detected    Final 06/08/2018  9:45 AM [facility]   Noroxycodone, Urine Not Detected    Final 06/08/2018  9:45 AM [facility]   NOROXYMORPHONE, URINE Not Detected    Final 06/08/2018  9:45 AM [facility]   Oxazepam, Urine Not Detected    Final 06/08/2018  9:45 AM [facility]   Oxycodone Urine Not Detected    Final 06/08/2018  9:45 AM [facility]   Oxymorphone, Urine Not Detected    Final 06/08/2018  9:45 AM [facility]   PCP, Urine Not Detected    Final 06/08/2018  9:45 AM [facility]   Phentermine, Ur Not Detected    Final 06/08/2018  9:45 AM [facility]   Propoxyphene, Urine Not Detected    Final 06/08/2018  9:45 AM [facility]   Tapentadol-O-Sulfate, Urine Not Detected    Final 06/08/2018  9:45 AM [facility]   Tapentadol, Urine Not Detected    Final 06/08/2018  9:45 AM [facility]   Temazepam, Urine Not Detected    Final 06/08/2018  9:45 AM [facility] Tramadol, Urine Not Detected    Final 06/08/2018  9:45 AM [facility]   Zolpidem, Urine Not Detected    Final 06/08/2018  9:45 AM [facility]   Drugs Expected, Ur OXYCODONE ON 6/3/18    Final 06/08/2018  9:45 AM [facility] [address]  Lab   Creatinine, Ur 197.8  20.0 - 400.0 mg/dL Final 06/08/2018  9:45 AM [facility]   Pain Mgt Drug Panel, Hi Res, Ur See Below    Final 06/08/2018  9:45 AM [facility]   (NOTE)   Methodology: Qualitative Enzyme Immunoassay and Qualitative Liquid   Chromatography-Time of Flight-Mass Spectrometry or Tandem Mass   Spectrometry, Quantitative Spectrophotometry   The absence of expected drug(s) and/or drug metabolite(s) may   indicate non-compliance, inappropriate timing of specimen   collection relative to drug administration, poor drug absorption,   diluted/adulterated urine, or limitations of testing. The   concentration must be greater than or equal to the cutoff to be   reported as present.  If specific drug concentrations are   required, contact the laboratory within two weeks of specimen   collection to request quantification by a second analytical   technique. Interpretive questions should be directed to the   laboratory. Results based on immunoassay detection that do not match clinical   expectations should be   interpreted with caution. Confirmatory testing by mass   spectrometry for immunoassay-based results is available, if   ordered within two weeks of specimen collection. Additional   charges apply. For medical purposes only; not valid for forensic use. This test was developed and its performance characteristics   determined by [facility]. The U.S. Food and Drug   Administration has not approved or cleared this test; however, FDA   clearance or approval is not currently required for clinical use. The results are not intended to be used as the sole means for   clinical diagnosis or patient management decisions.     EER Hi Res Interp Ur See Note    Final 06/08/2018  9:45 AM [facility]   (NOTE) Access [facility] [facility] Enhanced Report using either link below:   -Direct access: [email or web address]   -Enter Username, Password: [email or web address]   Username: [email or web address]   Password: [email or web address]   Performed by [facility] [address], [address] [facility] [phone]   www. Carmine Muñoz MD, Lab. Director   Performed at [address] [phone]                       Past Medical History:   Diagnosis Date    Allergic rhinitis     Anxiety 7/17/2013    Arthritis     Asthma     Atrial fibrillation (HCC)     Back pain     NERVE/DR. GRACIA    CAD (coronary artery disease) 3/21/2013    Caffeine use     2 coffee/day    CHF (congestive heart failure) (HCC)     Chronic kidney disease     COPD (chronic obstructive pulmonary disease) (HCC)     emphysema    Degeneration of lumbar or lumbosacral intervertebral disc     Depression     DM (diabetes mellitus) (HCC)     Emphysema of lung (HCC)     Gastritis     GERD (gastroesophageal reflux disease)     Hearing loss     Hematuria     Hiatal hernia     HTN (hypertension), benign 6/28/2014    Hypertriglyceridemia     Incontinence of urine 9/25/2013    Knee arthropathy     Lumbosacral spondylosis without myelopathy 9/29/2014    Migraine headache     DR. GRACIA    Mumps     Neuropathy     Obesity     On home oxygen therapy     2 L PER NC  HS AND PRN    HIGINIO on CPAP     Otitis media 1-26-15    Stroke (HCC)     QUESTIONABLE    Tinnitus     Type 2 diabetes mellitus without complication (HCC)     Type II or unspecified type diabetes mellitus without mention of complication, not stated as uncontrolled     UTI (lower urinary tract infection)     Varicose vein        Past Surgical History:   Procedure Laterality Date    ABLATION OF DYSRHYTHMIC FOCUS  2000    CARPAL TUNNEL RELEASE Right     CATARACT REMOVAL WITH IMPLANT Bilateral     CHOLECYSTECTOMY  2007    COLONOSCOPY      COLONOSCOPY  04/10/2017    tubular adenomo polyp , mod. sigmoid diverticulosis, min. int. hemorrhoids    CYSTOSCOPY  9/25/2013    DILATION AND CURETTAGE  1979    EYE SURGERY      laser    INCONTINENCE SURGERY      Percutaneous nerve stimulation Dr Bobbi Rose Nov 2013    [place]andy Woods INSERTABLE CARDIAC MONITOR  12/04/2015    [employer] REVEAL LINQ MODEL #[id number] MRI CONDTIONAL OK 3T. IMMEDIATELY POST IMPLANT    OTHER SURGICAL HISTORY  09/10/15    removal of interstim    ID COLSC FLX W/REMOVAL LESION BY HOT BX FORCEPS N/A 4/10/2017    COLONOSCOPY POLYPECTOMY HOT BIOPSY performed by Evan Rea MD at [address] [place]      TYMPANOPLASTY      TYMPANOPLASTY      TYMPANOSTOMY TUBE PLACEMENT  08/21/2017    UPPER GASTROINTESTINAL ENDOSCOPY  03/2016    Dr Fransisco Tracy       Allergies   Allergen Reactions    Robitussin [Guaifenesin] Anaphylaxis    Codeine Swelling    Compazine [Prochlorperazine Maleate] Hives and Swelling    Iodides Itching    Morphine Other (See Comments)     hallucinations    Moxifloxacin      Other reaction(s): Intolerance-unknown  Other reaction(s): Intolerance-unknown    Reglan [Metoclopramide] Nausea Only    Avelox [Moxifloxacin Hcl In Nacl] Rash     Dermatitis      Cipro Xr Rash     dermatitis    Sulfa Antibiotics Rash         Current Outpatient Medications:     [START ON 6/17/2019] oxyCODONE-acetaminophen (PERCOCET) 5-325 MG per tablet, Take 1 tablet by mouth See Admin Instructions for 30 days. Intended supply: 30 days.  1 tab 3-4 times a day prn, Disp: 100 tablet, Rfl: 0    carvedilol (COREG) 3.125 MG tablet, TAKE 1 TAB BY MOUTH TWICE A DAY ( AM AND BEDTIME ), Disp: 60 tablet, Rfl: 0    magnesium oxide (MAG-OX) 400 MG tablet, TAKE 1 TAB BY MOUTH TWICE A DAY ( AM AND BEDTIME ), Disp: 60 tablet, Rfl: 0    ferrous sulfate 325 (65 Fe) MG tablet, TAKE 1 TAB BY MOUTH EVERY MORNING, Disp: 30 tablet, Rfl: 0    pantoprazole (PROTONIX) 40 MG tablet, Take 1 tablet by mouth 2 times daily, Disp: 60 tablet, Rfl: 3    LANTUS SOLOSTAR 100 UNIT/ML injection pen, INJECT 45 UNITS IN THE MORNING ,AND 35 UNITS NIGHTLY, Disp: 30 mL, Rfl: 0    topiramate (TOPAMAX) 100 MG tablet, Take 1 tablet by mouth nightly, Disp: 90 tablet, Rfl: 0    nystatin (MYCOSTATIN) 248287 UNIT/GM powder, Apply 3 times daily. , Disp: 3 Bottle, Rfl: 3    clopidogrel (PLAVIX) 75 MG tablet, TAKE 1 TAB BY MOUTH ONCE A DAY, Disp: 90 tablet, Rfl: 3    ranitidine (ZANTAC) 150 MG tablet, TAKE 1 TAB BY MOUTH TWICE A DAY, Disp: 180 tablet, Rfl: 3    Melatonin 10 MG TABS, Take 10 mg by mouth nightly, Disp: 90 tablet, Rfl: 3    isosorbide mononitrate (IMDUR) 30 MG extended release tablet, TAKE 1 TABLET BY MOUTH DAILY, Disp: 30 tablet, Rfl: 5    SYMBICORT 160-4.5 MCG/ACT AERO,  2 puffs As needed for sob, Disp: , Rfl:     metFORMIN (GLUCOPHAGE) 1000 MG tablet, TAKE 1 TAB BY MOUTH TWICE A DAY ( AM AND BEDTIME ), Disp: 60 tablet, Rfl: 0    insulin aspart (NOVOLOG FLEXPEN) 100 UNIT/ML injection pen, If <139 - No Insulin; 140-199-2 Units;200-249-4 Units;250-299-6 Units;300-349-8 Units;350-400=10 Units; Above 400-12 Units, Disp: 5 pen, Rfl: 3    ibuprofen (IBU) 800 MG tablet, Take 1 tablet by mouth every 8 hours as needed for Pain, Disp: 45 tablet, Rfl: 0    citalopram (CELEXA) 40 MG tablet, TAKE 1/2  TAB BY MOUTH TWICE  A DAY, Disp: 90 tablet, Rfl: 3    lidocaine (LMX) 4 % cream, Apply topically every 8 hrs as needed for pain, Disp: 45 g, Rfl: 3    TRUE METRIX BLOOD GLUCOSE TEST strip, THREE TIMES A DAY, Disp: 1 each, Rfl: 3    Alcohol Swabs (B-D SINGLE USE SWABS REGULAR) PADS, THREE TIMES A DAY, Disp: 100 each, Rfl: 0    ULTICARE MINI PEN NEEDLES 31G X 6 MM MISC, USE AS DIRECTED, Disp: 100 each, Rfl: 0    furosemide (LASIX) 20 MG tablet, Take 1 tablet by mouth daily as needed (weight gain 3 lbs overnight), Disp: 30 tablet, Rfl: 3    losartan (COZAAR) 25 MG tablet, TAKE 1 TAB BY MOUTH ONCE A DAY, Disp: 90 tablet, Rfl: 5    Lift Chair MISC, by Does not apply route Use daily at home to help with ADL's, Disp: 1 each, Rfl: 0    vitamin B-12 (CYANOCOBALAMIN) 1000 MCG tablet, Take 1,000 mcg by mouth daily, Disp: , Rfl:     nitroGLYCERIN (NITROSTAT) 0.4 MG SL tablet, 1 under the tongue as needed for angina, may repeat q5mins for up three doses, Disp: , Rfl:     zoster recombinant adjuvanted vaccine (SHINGRIX) 50 MCG SUSR injection, 50 MCG IM then repeat 2-6 months., Disp: 0.5 mL, Rfl: 1    Blood Glucose Monitoring Suppl HARMEET, Use daily to check BS, Disp: 1 Device, Rfl: 0    ipratropium-albuterol (DUONEB) 0.5-2.5 (3) MG/3ML SOLN nebulizer solution, Inhale 3 mLs into the lungs every 4 hours, Disp: 360 mL, Rfl: 2    Compression Stockings MISC, by Does not apply route Pressure between 20 - 25 ., Disp: 1 each, Rfl: 0    ULTICARE SHORT PEN NEEDLES 31G X 8 MM MISC, AS DIRECTED, Disp: 100 each, Rfl: 5    docusate sodium (COLACE) 100 MG capsule, Take 1 capsule by mouth 2 times daily Please use while taking Percocet, Disp: 30 capsule, Rfl: 0    OXYGEN, Inhale 3 L into the lungs , Disp: , Rfl:     Family History   Problem Relation Age of Onset    Diabetes Mother     Heart Disease Mother     Stomach Cancer Father     Diabetes Maternal Grandmother         also aunts and uncles (maternal)    Emphysema Sister        Social History     Socioeconomic History    Marital status: Legally      Spouse name: Not on file    Number of children: Not on file    Years of education: Not on file    Highest education level: Not on file   Occupational History    Occupation: disabled     Employer: N/A   Social Needs    Financial resource strain: Not on file    Food insecurity:     Worry: Not on file     Inability: Not on file    Transportation needs:     Medical: Not on file     Non-medical: Not on file   Tobacco Use    Smoking status: Former Smoker     Packs/day: 3.00     Years: 41.00     Pack years: 123.00     Types: Cigarettes Last attempt to quit: 2000     Years since quittin.4    Smokeless tobacco: Never Used    Tobacco comment: quit in    Substance and Sexual Activity    Alcohol use: No     Alcohol/week: 0.0 oz    Drug use: No    Sexual activity: Not Currently   Lifestyle    Physical activity:     Days per week: Not on file     Minutes per session: Not on file    Stress: Not on file   Relationships    Social connections:     Talks on phone: Not on file     Gets together: Not on file     Attends [employer]ism service: Not on file     Active member of club or organization: Not on file     Attends meetings of clubs or organizations: Not on file     Relationship status: Not on file    Intimate partner violence:     Fear of current or ex partner: Not on file     Emotionally abused: Not on file     Physically abused: Not on file     Forced sexual activity: Not on file   Other Topics Concern    Not on file   Social History Narrative    Not on file       Review of Systems:  Review of Systems   Constitution: Negative. HENT: Positive for hearing loss. Hearing aids   Eyes:        Glasses   Endocrine:        Blood sugars 140-150 range   Hematologic/Lymphatic: Negative. Skin: Negative. Musculoskeletal: Positive for back pain and joint pain. Gastrointestinal: Negative. Genitourinary: Positive for nocturia. Neurological: Positive for headaches and numbness. Psychiatric/Behavioral: Negative. Physical Exam:  /64   Pulse 78   Temp 98.3 °F (36.8 °C)   Resp 16   Ht 5' 2\" (1.575 m)   Wt 252 lb (114.3 kg)   LMP  (LMP Unknown)   SpO2 93%   BMI 46.09 kg/m²     Physical Exam   Constitutional: She appears well-developed. Neck: Normal range of motion. Pulmonary/Chest: Effort normal.   Musculoskeletal:        Right shoulder: She exhibits decreased range of motion and tenderness. Neurological: She is alert. She has normal strength.  Gait abnormal.   Reflex Scores:       Patellar reflexes are 1+ on the right side and 1+ on the left side. Achilles reflexes are 1+ on the right side and 1+ on the left side. Skin: Skin is warm, dry and intact. Psychiatric: She has a normal mood and affect.  Her speech is normal and behavior is normal. Judgment and thought content normal. Cognition and memory are normal.         Assessment:      Problem List Items Addressed This Visit     Spondylarthrosis    Relevant Medications    oxyCODONE-acetaminophen (PERCOCET) 5-325 MG per tablet (Start on 6/17/2019)    Sacroiliitis, not elsewhere classified ([facility] [address] (Chronic)    Relevant Medications    oxyCODONE-acetaminophen (PERCOCET) 5-325 MG per tablet (Start on 6/17/2019)    Osteoarthritis of cervical spine (Chronic)    Relevant Medications    oxyCODONE-acetaminophen (PERCOCET) 5-325 MG per tablet (Start on 6/17/2019)    Occipital neuralgia (Chronic)    Medication monitoring encounter (Chronic)    Lumbosacral spondylosis without myelopathy (Chronic)    Relevant Medications    oxyCODONE-acetaminophen (PERCOCET) 5-325 MG per tablet (Start on 6/17/2019)    Intractable migraine with aura without status migrainosus    Relevant Medications    oxyCODONE-acetaminophen (PERCOCET) 5-325 MG per tablet (Start on 6/17/2019)    DDD (degenerative disc disease), lumbar    Relevant Medications    oxyCODONE-acetaminophen (PERCOCET) 5-325 MG per tablet (Start on 6/17/2019)    DDD (degenerative disc disease), cervical - Primary (Chronic)    Relevant Medications    oxyCODONE-acetaminophen (PERCOCET) 5-325 MG per tablet (Start on 6/17/2019)    Chronic pain of left knee    Relevant Medications    oxyCODONE-acetaminophen (PERCOCET) 5-325 MG per tablet (Start on 6/17/2019)    Chronic migraine without aura without status migrainosus, not intractable    Relevant Medications    oxyCODONE-acetaminophen (PERCOCET) 5-325 MG per tablet (Start on 6/17/2019)    Bilateral low back pain with sciatica    Relevant Medications    oxyCODONE-acetaminophen (PERCOCET) 5-325 MG per tablet (Start on 6/17/2019)      Other Visit Diagnoses     Lumbar radiculopathy, chronic        Relevant Medications    oxyCODONE-acetaminophen (PERCOCET) 5-325 MG per tablet (Start on 6/17/2019)            Treatment Plan:  DISCUSSION: Treatment options discussed withpatient and all questions answered to patient's satisfaction. Possible side effects, risk of tolerance and or dependence and alternative treatments discussed    Obtaining appropriate analgesic effect of treatment   No signs of potential drug abuse or diversion identified    [x] Ill effects of being on chronic pain medications such as sleep disturbances, respiratory depression, hormonal changes, withdrawal symptoms, chronic opioid dependence and tolerance as well as risk of taking opioids with Benzodiazepines and taking opioids along with alcohol,  werediscussed with patient. I had asked the patient to minimize medication use and utilize pain medications only for uncontrolled rest pain or pain with exertional activities. I advised patient not to self-escalate painmedications without consulting with us. At each of patient's future visits we will try to taper pain medications, while adjusting the adjunct medications, and re-evaluating for Physical Therapy to improve spinal andjoint strength. We will continue to have discussions to decrease pain medications as tolerated. Counseled patient on effects their pain medication and /or their medical condition mayhave on their  ability to drive or operate machinery. Instructed not to drive or operate machinery if drowsy     I also discussed with the patient regarding the dangers of combining narcotic pain medication with tranquilizers, alcohol or illegal drugs or taking the medication any way other than prescribed. The dangers were discussed  including respiratory depression and death.  Patient was told to tell  all  physicians regarding the medications he is getting from pain

## 2019-06-13 NOTE — ED NOTES
Bed: 39  Expected date:   Expected time:   Means of arrival:   Comments:  [address], RN  06/13/19 [id number]

## 2019-06-14 ENCOUNTER — APPOINTMENT (OUTPATIENT)
Dept: NUCLEAR MEDICINE | Age: 69
DRG: 313 | End: 2019-06-14
Payer: COMMERCIAL

## 2019-06-14 ENCOUNTER — APPOINTMENT (OUTPATIENT)
Dept: CT IMAGING | Age: 69
DRG: 313 | End: 2019-06-14
Payer: COMMERCIAL

## 2019-06-14 PROBLEM — R07.9 CHEST PAIN: Status: ACTIVE | Noted: 2019-06-14

## 2019-06-14 LAB
ABSOLUTE EOS #: <0.03 K/UL (ref 0–0.44)
ABSOLUTE IMMATURE GRANULOCYTE: 0.07 K/UL (ref 0–0.3)
ABSOLUTE LYMPH #: 0.73 K/UL (ref 1.1–3.7)
ABSOLUTE MONO #: 0.08 K/UL (ref 0.1–1.2)
ANION GAP SERPL CALCULATED.3IONS-SCNC: 14 MMOL/L (ref 9–17)
BASOPHILS # BLD: 0 % (ref 0–2)
BASOPHILS ABSOLUTE: <0.03 K/UL (ref 0–0.2)
BUN BLDV-MCNC: 22 MG/DL (ref 8–23)
BUN/CREAT BLD: ABNORMAL (ref 9–20)
CALCIUM SERPL-MCNC: 9.2 MG/DL (ref 8.6–10.4)
CHLORIDE BLD-SCNC: 97 MMOL/L (ref 98–107)
CO2: 22 MMOL/L (ref 20–31)
CREAT SERPL-MCNC: 1.13 MG/DL (ref 0.5–0.9)
DIFFERENTIAL TYPE: ABNORMAL
EKG ATRIAL RATE: 74 BPM
EKG ATRIAL RATE: 78 BPM
EKG P AXIS: 75 DEGREES
EKG P AXIS: 78 DEGREES
EKG P-R INTERVAL: 140 MS
EKG P-R INTERVAL: 146 MS
EKG Q-T INTERVAL: 324 MS
EKG Q-T INTERVAL: 372 MS
EKG QRS DURATION: 66 MS
EKG QRS DURATION: 70 MS
EKG QTC CALCULATION (BAZETT): 359 MS
EKG QTC CALCULATION (BAZETT): 424 MS
EKG R AXIS: -21 DEGREES
EKG R AXIS: -27 DEGREES
EKG T AXIS: 82 DEGREES
EKG T AXIS: 89 DEGREES
EKG VENTRICULAR RATE: 74 BPM
EKG VENTRICULAR RATE: 78 BPM
EOSINOPHILS RELATIVE PERCENT: 0 % (ref 1–4)
GFR AFRICAN AMERICAN: 58 ML/MIN
GFR NON-AFRICAN AMERICAN: 48 ML/MIN
GFR SERPL CREATININE-BSD FRML MDRD: ABNORMAL ML/MIN/{1.73_M2}
GFR SERPL CREATININE-BSD FRML MDRD: ABNORMAL ML/MIN/{1.73_M2}
GLUCOSE BLD-MCNC: 183 MG/DL (ref 65–105)
GLUCOSE BLD-MCNC: 184 MG/DL (ref 65–105)
GLUCOSE BLD-MCNC: 240 MG/DL (ref 70–99)
GLUCOSE BLD-MCNC: 243 MG/DL (ref 65–105)
GLUCOSE BLD-MCNC: 264 MG/DL (ref 65–105)
GLUCOSE BLD-MCNC: 282 MG/DL (ref 65–105)
HCT VFR BLD CALC: 40.2 % (ref 36.3–47.1)
HEMOGLOBIN: 12.5 G/DL (ref 11.9–15.1)
IMMATURE GRANULOCYTES: 1 %
LV EF: 76 %
LVEF MODALITY: NORMAL
LYMPHOCYTES # BLD: 12 % (ref 24–43)
MCH RBC QN AUTO: 30 PG (ref 25.2–33.5)
MCHC RBC AUTO-ENTMCNC: 31.1 G/DL (ref 28.4–34.8)
MCV RBC AUTO: 96.6 FL (ref 82.6–102.9)
MONOCYTES # BLD: 1 % (ref 3–12)
NRBC AUTOMATED: 0 PER 100 WBC
PDW BLD-RTO: 13.2 % (ref 11.8–14.4)
PLATELET # BLD: 195 K/UL (ref 138–453)
PLATELET ESTIMATE: ABNORMAL
PMV BLD AUTO: 11.5 FL (ref 8.1–13.5)
POTASSIUM SERPL-SCNC: 5.1 MMOL/L (ref 3.7–5.3)
RBC # BLD: 4.16 M/UL (ref 3.95–5.11)
RBC # BLD: ABNORMAL 10*6/UL
SEG NEUTROPHILS: 86 % (ref 36–65)
SEGMENTED NEUTROPHILS ABSOLUTE COUNT: 5.43 K/UL (ref 1.5–8.1)
SODIUM BLD-SCNC: 133 MMOL/L (ref 135–144)
TROPONIN INTERP: NORMAL
TROPONIN T: NORMAL NG/ML
TROPONIN, HIGH SENSITIVITY: 7 NG/L (ref 0–14)
TROPONIN, HIGH SENSITIVITY: 7 NG/L (ref 0–14)
TROPONIN, HIGH SENSITIVITY: 9 NG/L (ref 0–14)
TROPONIN, HIGH SENSITIVITY: <6 NG/L (ref 0–14)
WBC # BLD: 6.3 K/UL (ref 3.5–11.3)
WBC # BLD: ABNORMAL 10*3/UL

## 2019-06-14 PROCEDURE — 85025 COMPLETE CBC W/AUTO DIFF WBC: CPT

## 2019-06-14 PROCEDURE — 96372 THER/PROPH/DIAG INJ SC/IM: CPT

## 2019-06-14 PROCEDURE — 93010 ELECTROCARDIOGRAM REPORT: CPT | Performed by: INTERNAL MEDICINE

## 2019-06-14 PROCEDURE — G0378 HOSPITAL OBSERVATION PER HR: HCPCS

## 2019-06-14 PROCEDURE — 6370000000 HC RX 637 (ALT 250 FOR IP): Performed by: STUDENT IN AN ORGANIZED HEALTH CARE EDUCATION/TRAINING PROGRAM

## 2019-06-14 PROCEDURE — 84484 ASSAY OF TROPONIN QUANT: CPT

## 2019-06-14 PROCEDURE — 93017 CV STRESS TEST TRACING ONLY: CPT | Performed by: NURSE PRACTITIONER

## 2019-06-14 PROCEDURE — 6360000002 HC RX W HCPCS: Performed by: STUDENT IN AN ORGANIZED HEALTH CARE EDUCATION/TRAINING PROGRAM

## 2019-06-14 PROCEDURE — 80048 BASIC METABOLIC PNL TOTAL CA: CPT

## 2019-06-14 PROCEDURE — 6370000000 HC RX 637 (ALT 250 FOR IP): Performed by: EMERGENCY MEDICINE

## 2019-06-14 PROCEDURE — 2580000003 HC RX 258: Performed by: STUDENT IN AN ORGANIZED HEALTH CARE EDUCATION/TRAINING PROGRAM

## 2019-06-14 PROCEDURE — A9500 TC99M SESTAMIBI: HCPCS | Performed by: STUDENT IN AN ORGANIZED HEALTH CARE EDUCATION/TRAINING PROGRAM

## 2019-06-14 PROCEDURE — 36415 COLL VENOUS BLD VENIPUNCTURE: CPT

## 2019-06-14 PROCEDURE — 6360000002 HC RX W HCPCS: Performed by: EMERGENCY MEDICINE

## 2019-06-14 PROCEDURE — 96375 TX/PRO/DX INJ NEW DRUG ADDON: CPT

## 2019-06-14 PROCEDURE — 2700000000 HC OXYGEN THERAPY PER DAY

## 2019-06-14 PROCEDURE — 71260 CT THORAX DX C+: CPT

## 2019-06-14 PROCEDURE — 78452 HT MUSCLE IMAGE SPECT MULT: CPT

## 2019-06-14 PROCEDURE — 93005 ELECTROCARDIOGRAM TRACING: CPT | Performed by: EMERGENCY MEDICINE

## 2019-06-14 PROCEDURE — 2580000003 HC RX 258: Performed by: EMERGENCY MEDICINE

## 2019-06-14 PROCEDURE — 94640 AIRWAY INHALATION TREATMENT: CPT

## 2019-06-14 PROCEDURE — 82947 ASSAY GLUCOSE BLOOD QUANT: CPT

## 2019-06-14 PROCEDURE — 6360000004 HC RX CONTRAST MEDICATION: Performed by: EMERGENCY MEDICINE

## 2019-06-14 PROCEDURE — 3430000000 HC RX DIAGNOSTIC RADIOPHARMACEUTICAL: Performed by: STUDENT IN AN ORGANIZED HEALTH CARE EDUCATION/TRAINING PROGRAM

## 2019-06-14 RX ORDER — NITROGLYCERIN 0.4 MG/1
0.4 TABLET SUBLINGUAL EVERY 5 MIN PRN
Status: DISCONTINUED | OUTPATIENT
Start: 2019-06-14 | End: 2019-06-14

## 2019-06-14 RX ORDER — METOPROLOL TARTRATE 5 MG/5ML
5 INJECTION INTRAVENOUS EVERY 5 MIN PRN
Status: DISCONTINUED | OUTPATIENT
Start: 2019-06-14 | End: 2019-06-14

## 2019-06-14 RX ORDER — ATROPINE SULFATE 0.1 MG/ML
0.5 INJECTION INTRAVENOUS EVERY 5 MIN PRN
Status: DISCONTINUED | OUTPATIENT
Start: 2019-06-14 | End: 2019-06-14

## 2019-06-14 RX ORDER — CARVEDILOL 3.12 MG/1
3.12 TABLET ORAL 2 TIMES DAILY WITH MEALS
Status: DISCONTINUED | OUTPATIENT
Start: 2019-06-14 | End: 2019-06-17 | Stop reason: HOSPADM

## 2019-06-14 RX ORDER — INSULIN GLARGINE 100 [IU]/ML
30 INJECTION, SOLUTION SUBCUTANEOUS NIGHTLY
Status: DISCONTINUED | OUTPATIENT
Start: 2019-06-14 | End: 2019-06-17 | Stop reason: HOSPADM

## 2019-06-14 RX ORDER — SODIUM CHLORIDE 0.9 % (FLUSH) 0.9 %
10 SYRINGE (ML) INJECTION PRN
Status: DISCONTINUED | OUTPATIENT
Start: 2019-06-14 | End: 2019-06-17 | Stop reason: HOSPADM

## 2019-06-14 RX ORDER — SODIUM CHLORIDE 0.9 % (FLUSH) 0.9 %
10 SYRINGE (ML) INJECTION EVERY 12 HOURS SCHEDULED
Status: DISCONTINUED | OUTPATIENT
Start: 2019-06-14 | End: 2019-06-17 | Stop reason: HOSPADM

## 2019-06-14 RX ORDER — INSULIN GLARGINE 100 [IU]/ML
45 INJECTION, SOLUTION SUBCUTANEOUS EVERY MORNING
Status: DISCONTINUED | OUTPATIENT
Start: 2019-06-14 | End: 2019-06-17 | Stop reason: HOSPADM

## 2019-06-14 RX ORDER — AMINOPHYLLINE DIHYDRATE 25 MG/ML
50 INJECTION, SOLUTION INTRAVENOUS PRN
Status: DISCONTINUED | OUTPATIENT
Start: 2019-06-14 | End: 2019-06-14

## 2019-06-14 RX ORDER — FUROSEMIDE 20 MG/1
20 TABLET ORAL DAILY PRN
Status: DISCONTINUED | OUTPATIENT
Start: 2019-06-14 | End: 2019-06-17 | Stop reason: HOSPADM

## 2019-06-14 RX ORDER — TOPIRAMATE 100 MG/1
100 TABLET, FILM COATED ORAL NIGHTLY
Status: DISCONTINUED | OUTPATIENT
Start: 2019-06-14 | End: 2019-06-17 | Stop reason: HOSPADM

## 2019-06-14 RX ORDER — SODIUM CHLORIDE 9 MG/ML
500 INJECTION, SOLUTION INTRAVENOUS CONTINUOUS PRN
Status: DISCONTINUED | OUTPATIENT
Start: 2019-06-14 | End: 2019-06-14

## 2019-06-14 RX ORDER — ACETAMINOPHEN 325 MG/1
650 TABLET ORAL EVERY 4 HOURS PRN
Status: DISCONTINUED | OUTPATIENT
Start: 2019-06-14 | End: 2019-06-17 | Stop reason: HOSPADM

## 2019-06-14 RX ORDER — OXYCODONE HYDROCHLORIDE AND ACETAMINOPHEN 5; 325 MG/1; MG/1
2 TABLET ORAL EVERY 4 HOURS PRN
Status: DISCONTINUED | OUTPATIENT
Start: 2019-06-14 | End: 2019-06-17 | Stop reason: HOSPADM

## 2019-06-14 RX ORDER — NITROGLYCERIN 0.4 MG/1
0.4 TABLET SUBLINGUAL ONCE
Status: COMPLETED | OUTPATIENT
Start: 2019-06-14 | End: 2019-06-14

## 2019-06-14 RX ORDER — ALBUTEROL SULFATE 90 UG/1
2 AEROSOL, METERED RESPIRATORY (INHALATION) PRN
Status: DISCONTINUED | OUTPATIENT
Start: 2019-06-14 | End: 2019-06-14

## 2019-06-14 RX ORDER — MORPHINE SULFATE 2 MG/ML
2 INJECTION, SOLUTION INTRAMUSCULAR; INTRAVENOUS
Status: DISCONTINUED | OUTPATIENT
Start: 2019-06-14 | End: 2019-06-14

## 2019-06-14 RX ORDER — MORPHINE SULFATE 4 MG/ML
4 INJECTION, SOLUTION INTRAMUSCULAR; INTRAVENOUS
Status: DISCONTINUED | OUTPATIENT
Start: 2019-06-14 | End: 2019-06-14

## 2019-06-14 RX ORDER — DEXTROSE MONOHYDRATE 25 G/50ML
12.5 INJECTION, SOLUTION INTRAVENOUS PRN
Status: DISCONTINUED | OUTPATIENT
Start: 2019-06-14 | End: 2019-06-17 | Stop reason: HOSPADM

## 2019-06-14 RX ORDER — NICOTINE POLACRILEX 4 MG
15 LOZENGE BUCCAL PRN
Status: DISCONTINUED | OUTPATIENT
Start: 2019-06-14 | End: 2019-06-17 | Stop reason: HOSPADM

## 2019-06-14 RX ORDER — SODIUM CHLORIDE 0.9 % (FLUSH) 0.9 %
10 SYRINGE (ML) INJECTION PRN
Status: DISCONTINUED | OUTPATIENT
Start: 2019-06-14 | End: 2019-06-14

## 2019-06-14 RX ORDER — IPRATROPIUM BROMIDE AND ALBUTEROL SULFATE 2.5; .5 MG/3ML; MG/3ML
3 SOLUTION RESPIRATORY (INHALATION) EVERY 4 HOURS
Status: DISCONTINUED | OUTPATIENT
Start: 2019-06-14 | End: 2019-06-17 | Stop reason: HOSPADM

## 2019-06-14 RX ORDER — CITALOPRAM 20 MG/1
40 TABLET ORAL DAILY
Status: DISCONTINUED | OUTPATIENT
Start: 2019-06-14 | End: 2019-06-17 | Stop reason: HOSPADM

## 2019-06-14 RX ORDER — OXYCODONE HYDROCHLORIDE 5 MG/1
5 TABLET ORAL ONCE
Status: COMPLETED | OUTPATIENT
Start: 2019-06-14 | End: 2019-06-14

## 2019-06-14 RX ORDER — CLOPIDOGREL BISULFATE 75 MG/1
75 TABLET ORAL DAILY
Status: DISCONTINUED | OUTPATIENT
Start: 2019-06-14 | End: 2019-06-17 | Stop reason: HOSPADM

## 2019-06-14 RX ORDER — OXYCODONE HYDROCHLORIDE AND ACETAMINOPHEN 5; 325 MG/1; MG/1
1 TABLET ORAL EVERY 4 HOURS PRN
Status: DISCONTINUED | OUTPATIENT
Start: 2019-06-14 | End: 2019-06-17 | Stop reason: HOSPADM

## 2019-06-14 RX ORDER — SODIUM CHLORIDE 9 MG/ML
INJECTION, SOLUTION INTRAVENOUS CONTINUOUS
Status: DISCONTINUED | OUTPATIENT
Start: 2019-06-14 | End: 2019-06-17 | Stop reason: HOSPADM

## 2019-06-14 RX ORDER — LOSARTAN POTASSIUM 25 MG/1
25 TABLET ORAL DAILY
Status: DISCONTINUED | OUTPATIENT
Start: 2019-06-14 | End: 2019-06-17 | Stop reason: HOSPADM

## 2019-06-14 RX ORDER — UREA 10 %
10 LOTION (ML) TOPICAL NIGHTLY PRN
Status: DISCONTINUED | OUTPATIENT
Start: 2019-06-14 | End: 2019-06-17 | Stop reason: HOSPADM

## 2019-06-14 RX ORDER — DEXTROSE MONOHYDRATE 50 MG/ML
100 INJECTION, SOLUTION INTRAVENOUS PRN
Status: DISCONTINUED | OUTPATIENT
Start: 2019-06-14 | End: 2019-06-17 | Stop reason: HOSPADM

## 2019-06-14 RX ADMIN — IPRATROPIUM BROMIDE AND ALBUTEROL SULFATE 3 ML: .5; 3 SOLUTION RESPIRATORY (INHALATION) at 11:54

## 2019-06-14 RX ADMIN — ENOXAPARIN SODIUM 40 MG: 40 INJECTION, SOLUTION INTRAVENOUS; SUBCUTANEOUS at 12:12

## 2019-06-14 RX ADMIN — LOSARTAN POTASSIUM 25 MG: 25 TABLET, FILM COATED ORAL at 12:12

## 2019-06-14 RX ADMIN — IPRATROPIUM BROMIDE AND ALBUTEROL SULFATE 3 ML: .5; 3 SOLUTION RESPIRATORY (INHALATION) at 20:54

## 2019-06-14 RX ADMIN — TETRAKIS(2-METHOXYISOBUTYLISOCYANIDE)COPPER(I) TETRAFLUOROBORATE 36.5 MILLICURIE: 1 INJECTION, POWDER, LYOPHILIZED, FOR SOLUTION INTRAVENOUS at 10:15

## 2019-06-14 RX ADMIN — Medication 10 ML: at 12:13

## 2019-06-14 RX ADMIN — CLOPIDOGREL 75 MG: 75 TABLET, FILM COATED ORAL at 12:11

## 2019-06-14 RX ADMIN — CARVEDILOL 3.12 MG: 3.12 TABLET, FILM COATED ORAL at 12:12

## 2019-06-14 RX ADMIN — CARVEDILOL 3.12 MG: 3.12 TABLET, FILM COATED ORAL at 23:45

## 2019-06-14 RX ADMIN — TETRAKIS(2-METHOXYISOBUTYLISOCYANIDE)COPPER(I) TETRAFLUOROBORATE 16.2 MILLICURIE: 1 INJECTION, POWDER, LYOPHILIZED, FOR SOLUTION INTRAVENOUS at 10:35

## 2019-06-14 RX ADMIN — INSULIN LISPRO 1 UNITS: 100 INJECTION, SOLUTION INTRAVENOUS; SUBCUTANEOUS at 23:48

## 2019-06-14 RX ADMIN — OXYCODONE HYDROCHLORIDE AND ACETAMINOPHEN 2 TABLET: 5; 325 TABLET ORAL at 19:27

## 2019-06-14 RX ADMIN — TOPIRAMATE 100 MG: 100 TABLET, FILM COATED ORAL at 23:45

## 2019-06-14 RX ADMIN — Medication 10 MG: at 23:45

## 2019-06-14 RX ADMIN — Medication 10 ML: at 09:44

## 2019-06-14 RX ADMIN — FUROSEMIDE 20 MG: 20 TABLET ORAL at 12:11

## 2019-06-14 RX ADMIN — MOMETASONE FUROATE AND FORMOTEROL FUMARATE DIHYDRATE 2 PUFF: 200; 5 AEROSOL RESPIRATORY (INHALATION) at 08:02

## 2019-06-14 RX ADMIN — OXYCODONE HYDROCHLORIDE AND ACETAMINOPHEN 2 TABLET: 5; 325 TABLET ORAL at 13:54

## 2019-06-14 RX ADMIN — SODIUM CHLORIDE, PRESERVATIVE FREE 10 ML: 5 INJECTION INTRAVENOUS at 07:55

## 2019-06-14 RX ADMIN — CITALOPRAM 40 MG: 20 TABLET, FILM COATED ORAL at 12:12

## 2019-06-14 RX ADMIN — REGADENOSON 0.4 MG: 0.08 INJECTION, SOLUTION INTRAVENOUS at 10:15

## 2019-06-14 RX ADMIN — SODIUM CHLORIDE, PRESERVATIVE FREE 10 ML: 5 INJECTION INTRAVENOUS at 10:15

## 2019-06-14 RX ADMIN — INSULIN LISPRO 6 UNITS: 100 INJECTION, SOLUTION INTRAVENOUS; SUBCUTANEOUS at 13:57

## 2019-06-14 RX ADMIN — INSULIN GLARGINE 45 UNITS: 100 INJECTION, SOLUTION SUBCUTANEOUS at 12:11

## 2019-06-14 RX ADMIN — OXYCODONE HYDROCHLORIDE 5 MG: 5 TABLET ORAL at 01:20

## 2019-06-14 RX ADMIN — INSULIN LISPRO 2 UNITS: 100 INJECTION, SOLUTION INTRAVENOUS; SUBCUTANEOUS at 17:39

## 2019-06-14 RX ADMIN — IPRATROPIUM BROMIDE AND ALBUTEROL SULFATE 3 ML: .5; 3 SOLUTION RESPIRATORY (INHALATION) at 16:27

## 2019-06-14 RX ADMIN — INSULIN LISPRO 4 UNITS: 100 INJECTION, SOLUTION INTRAVENOUS; SUBCUTANEOUS at 01:20

## 2019-06-14 RX ADMIN — NITROGLYCERIN 0.4 MG: 0.4 TABLET SUBLINGUAL at 01:20

## 2019-06-14 RX ADMIN — IOHEXOL 75 ML: 350 INJECTION, SOLUTION INTRAVENOUS at 00:18

## 2019-06-14 RX ADMIN — IPRATROPIUM BROMIDE AND ALBUTEROL SULFATE 3 ML: .5; 3 SOLUTION RESPIRATORY (INHALATION) at 08:01

## 2019-06-14 ASSESSMENT — PAIN SCALES - GENERAL
PAINLEVEL_OUTOF10: 8
PAINLEVEL_OUTOF10: 7
PAINLEVEL_OUTOF10: 9
PAINLEVEL_OUTOF10: 6
PAINLEVEL_OUTOF10: 8

## 2019-06-14 ASSESSMENT — PAIN DESCRIPTION - LOCATION: LOCATION: CHEST

## 2019-06-14 ASSESSMENT — PAIN DESCRIPTION - ORIENTATION: ORIENTATION: RIGHT

## 2019-06-14 ASSESSMENT — PAIN DESCRIPTION - DESCRIPTORS: DESCRIPTORS: ACHING

## 2019-06-14 ASSESSMENT — PAIN DESCRIPTION - FREQUENCY: FREQUENCY: CONTINUOUS

## 2019-06-14 ASSESSMENT — HEART SCORE: ECG: 1

## 2019-06-14 ASSESSMENT — PAIN DESCRIPTION - PAIN TYPE: TYPE: ACUTE PAIN

## 2019-06-14 ASSESSMENT — PAIN DESCRIPTION - ONSET: ONSET: ON-GOING

## 2019-06-14 NOTE — CARE COORDINATION
Case Management Initial Discharge Plan  Raymond Donnelly,             Met with:patient to discuss discharge plans. Information verified: address, contacts, phone number, , insurance Yes  PCP: Phong Thrasher MD  Date of last visit: 1 month ago    Insurance Provider: Ayleen Dual     Discharge Planning    Living Arrangements:  [place]:  Children    Home: apartment, no steps, building has an elevator    Patient able to perform ADL's:Independent    Current Services (outpatient & in home) Passport  [facility] for skilled and [facility] services. DME equipment: walker, c-pap, scooter, glucometer, nebulizer, shower chair, concentrator  DME provider:     Pharmacy: [facility], medications are delivered to pt's home. Potential Assistance Purchasing Medications:  Yes  Does patient want to participate in local refill/ meds to beds program?  No    Potential Assistance Needed:  Home Care    Patient agreeable to home care: Yes, current with [facility] nursing 2x/week and [facility] 3x/week for 2hours each visit. Freedom of choice provided:  n/a    Prior SNF/Rehab Placement and Facility:   Agreeable to SNF/Rehab: No  [place] of choice provided: n/a   Evaluation: no    Expected Discharge date:  19  Patient expects to be discharged to:  home  Follow Up Appointment: Best Day/ Time: Monday AM    [facility]  Transportation arrangements needed for discharge: TBD    Readmission Risk              Risk of Unplanned Readmission:        19             Does patient have a readmission risk score greater than 14?: Yes  If yes, follow-up appointment must be made within 7 days of discharge. Discharge Plan: return to home with current services. 1700: received a call from Anamika Rico at [facility], and patient is not current with their services. Patient has Passport services,  is Mrs. Keo Wyatt.           Electronically signed by Gregor Mantilla RN on 19 at 4:26 PM

## 2019-06-14 NOTE — ED PROVIDER NOTES
[facility] ED  Emergency Department  Faculty Sign-Out Addendum     Care of Michelle Harvey was assumed from previous attending and is being seen for Chest Pain  . The patient's initial evaluation and plan have been discussed with the prior provider who initially evaluated the patient. EMERGENCY DEPARTMENT COURSE / MEDICAL DECISION MAKING:       MEDICATIONS GIVEN:  Orders Placed This Encounter   Medications    diphenhydrAMINE (BENADRYL) tablet 50 mg    predniSONE (DELTASONE) tablet 50 mg    fentaNYL (SUBLIMAZE) injection 50 mcg    predniSONE (DELTASONE) tablet 60 mg    diphenhydrAMINE (BENADRYL) injection 25 mg    iohexol (OMNIPAQUE 350) solution 75 mL       LABS / RADIOLOGY:     Labs Reviewed   CBC WITH AUTO DIFFERENTIAL - Abnormal; Notable for the following components:       Result Value    Immature Granulocytes 1 (*)     All other components within normal limits   COMPREHENSIVE METABOLIC PANEL - Abnormal; Notable for the following components:    CREATININE 1.11 (*)     Chloride 97 (*)     Total Bilirubin 0.21 (*)     GFR Non- 49 (*)     GFR  59 (*)     All other components within normal limits   POC GLUCOSE FINGERSTICK - Abnormal; Notable for the following components:    POC Glucose 174 (*)     All other components within normal limits   TROPONIN   LIPASE   D-DIMER, QUANTITATIVE       Xr Chest Standard (2 Vw)    Result Date: 6/13/2019  EXAMINATION: TWO XRAY VIEWS OF THE CHEST 6/13/2019 6:35 pm COMPARISON: March 14, 2019 HISTORY: ORDERING SYSTEM PROVIDED HISTORY: R sided CP TECHNOLOGIST PROVIDED HISTORY: R sided CP FINDINGS: Is loop recorder on the left. The lungs are without acute focal process. There is no effusion or pneumothorax. The cardiomediastinal silhouette is without acute process. The osseous structures are without acute process. No acute process.        RECENT VITALS:     Temp: 97.9 °F (36.6 °C),  Pulse: 77, Resp: 19, BP: (!) 150/64, SpO2: 97 %    This patient is a 70 y.o. Female with chest pain. D-dimer positive. Has contrast allergy and is now undergoing 4 hour prep for CT scan. Plan is admission after CT result. OUTSTANDING TASKS / RECOMMENDATIONS:    1. Allergy prep  2.  CT PE      Nir Colby MD  Attending Emergency Physician  [facility] ED       Ana Luciano MD  06/14/19 4772

## 2019-06-14 NOTE — ED NOTES
Pt resting on cart with call light within reach. Denying any complaints or requests. NAD noted, RR even and NL. Will continue to monitor.      Aditi Rodriguez RN  06/13/19 [id number]

## 2019-06-14 NOTE — ED NOTES
Pt up to bedside commode for elimination needs. Pt resting comfortably NAD noted. Call light in reach. Requesting sugar to be drawn.       Miguel A Cortes RN  06/14/19 0194

## 2019-06-14 NOTE — H&P
[facility]  CDU / OBSERVATION eNCOUnter  Resident Note     Pt Name: Rebeka Browne  MRN: [id number]  [place] 1950  Date of evaluation: 6/14/19  Patient's PCP is :  Esthela Noble MD    CHIEF COMPLAINT       Chief Complaint   Patient presents with    Chest Pain         HISTORY OF PRESENT ILLNESS    Rebeka Browne is a 70 y.o. female who presents right-sided chest pain with radiation around right breast and into her right neck. This is identified as chest pressure. This is associated with shortness of breath. This came on at rest.  Patient had negative CT of the chest without identification of PE. Troponins negative. Echocardiogram 3 months ago showing ejection fraction of 55%. Stress test last year. Patient follows with premedical cardiology. Location/Symptom: right chest  Timing/Onset: yesterday  Provocation: unclear, at rest  Quality: pressure  Radiation: right neck  Severity: moderate  Timing/Duration: constant  Modifying Factors: unclear    REVIEW OF SYSTEMS       General ROS - No fevers, No malaise   Ophthalmic ROS - No discharge, No changes in vision  ENT ROS -  No sore throat, No rhinorrhea,   Respiratory ROS - shortness of breath, no cough, no  wheezing  Cardiovascular ROS -  chest pain, dyspnea on exertion  Gastrointestinal ROS - No abdominal pain, no nausea or vomiting, no change in bowel habits, no black or bloody stools  Genito-Urinary ROS - No dysuria, trouble voiding, or hematuria  Musculoskeletal ROS - No myalgias, No arthalgias  Neurological ROS - No headache, no dizziness/lightheadedness, No focal weakness, no loss of sensation  Dermatological ROS - No lesions, No rash     (PQRS) Advance directives on face sheet per hospital policy.  No change unless specifically mentioned in chart    PAST MEDICAL HISTORY    has a past medical history of Allergic rhinitis, Anxiety, Arthritis, Asthma, Atrial fibrillation ([address] Back pain, CAD (coronary artery disease), Caffeine use, CHF (congestive heart failure) ([facility]), Chronic kidney disease, COPD (chronic obstructive pulmonary disease) ([address] Degeneration of lumbar or lumbosacral intervertebral disc, Depression, DM (diabetes mellitus) ([address] Emphysema of lung ([address] Gastritis, GERD (gastroesophageal reflux disease), Hearing loss, Hematuria, Hiatal hernia, HTN (hypertension), benign, Hypertriglyceridemia, Incontinence of urine, Knee arthropathy, Lumbosacral spondylosis without myelopathy, Migraine headache, Mumps, Neuropathy, Obesity, On home oxygen therapy, HIGINIO on CPAP, Otitis media, Stroke ([address] Tinnitus, Type 2 diabetes mellitus without complication ([address] Type II or unspecified type diabetes mellitus without mention of complication, not stated as uncontrolled, UTI (lower urinary tract infection), and Varicose vein. I have reviewed the past medical history with the patient and it is pertinent to this complaint. SURGICAL HISTORY      has a past surgical history that includes Cholecystectomy (2007); Carpal tunnel release (Right); Tympanoplasty; Dilation & curettage (1979); Cystocopy (9/25/2013); Cataract removal with implant (Bilateral); Tonsillectomy; Incontinence surgery; ablation of dysrhythmic focus (2000); Upper gastrointestinal endoscopy (03/2016); eye surgery; Tubal ligation; other surgical history (09/10/15); Insertable Cardiac Monitor (12/04/2015); Tympanoplasty; Colonoscopy; Colonoscopy (04/10/2017); pr colsc flx w/removal lesion by hot bx forceps (N/A, 4/10/2017); and Tympanostomy tube placement (08/21/2017). I have reviewed and agree with Surgical History entered and it is pertinent to this complaint.      CURRENT MEDICATIONS       carvedilol (COREG) tablet 3.125 mg BID WC   citalopram (CELEXA) tablet 40 mg Daily   clopidogrel (PLAVIX) tablet 75 mg Daily   furosemide (LASIX) tablet 20 mg Daily PRN   ipratropium-albuterol (DUONEB) nebulizer solution 3 mL Q4H   insulin glargine (LANTUS) injection vial 30 Units Nightly   insulin glargine (LANTUS) injection vial 45 Units QAM   losartan (COZAAR) tablet 25 mg Daily   mometasone-formoterol (DULERA) 200-5 MCG/ACT inhaler 2 puff BID   topiramate (TOPAMAX) tablet 100 mg Nightly   sodium chloride flush 0.9 % injection 10 mL 2 times per day   sodium chloride flush 0.9 % injection 10 mL PRN   acetaminophen (TYLENOL) tablet 650 mg Q4H PRN   enoxaparin (LOVENOX) injection 40 mg Daily   oxyCODONE-acetaminophen (PERCOCET) 5-325 MG per tablet 1 tablet Q4H PRN   Or    oxyCODONE-acetaminophen (PERCOCET) 5-325 MG per tablet 2 tablet Q4H PRN   morphine (PF) injection 2 mg Q2H PRN   Or    morphine (PF) injection 4 mg Q2H PRN   insulin lispro (HUMALOG) injection vial 0-12 Units TID WC   insulin lispro (HUMALOG) injection vial 0-6 Units Nightly   0.9 % sodium chloride infusion Continuous   sodium chloride flush 0.9 % injection 10 mL PRN   sodium chloride flush 0.9 % injection 10 mL PRN       All medication charted and reviewed. ALLERGIES     is allergic to robitussin [guaifenesin]; codeine; compazine [prochlorperazine maleate]; iodides; morphine; moxifloxacin; reglan [metoclopramide]; avelox [moxifloxacin hcl in nacl]; cipro xr; and sulfa antibiotics. FAMILY HISTORY     indicated that her mother is . She indicated that her father is . She indicated that her sister is . She indicated that the status of her maternal grandmother is unknown.     family history includes Diabetes in her maternal grandmother and mother; Emphysema in her sister; Heart Disease in her mother; Stomach Cancer in her father. The patient denies any pertinent family history. I have reviewed and agree with the family history entered. I have reviewed the Family History and it is not significant to the case    SOCIAL HISTORY      reports that she quit smoking about 19 years ago. Her smoking use included cigarettes. She has a 123.00 pack-year smoking history.  She has never used smokeless Chest Standard (2 Vw)    Result Date: 6/13/2019  EXAMINATION: TWO XRAY VIEWS OF THE CHEST 6/13/2019 6:35 pm COMPARISON: March 14, 2019 HISTORY: ORDERING SYSTEM PROVIDED HISTORY: R sided CP TECHNOLOGIST PROVIDED HISTORY: R sided CP FINDINGS: Is loop recorder on the left. The lungs are without acute focal process. There is no effusion or pneumothorax. The cardiomediastinal silhouette is without acute process. The osseous structures are without acute process. No acute process. Ct Chest Pulmonary Embolism W Contrast    Result Date: 6/14/2019  EXAMINATION: CTA OF THE CHEST 6/13/2019 11:31 pm TECHNIQUE: CTA of the chest was performed after the administration of intravenous contrast.  Multiplanar reformatted images are provided for review. MIP images are provided for review. Dose modulation, iterative reconstruction, and/or weight based adjustment of the mA/kV was utilized to reduce the radiation dose to as low as reasonably achievable. COMPARISON: None. HISTORY: ORDERING SYSTEM PROVIDED HISTORY: chest pain, elevated d dimer TECHNOLOGIST PROVIDED HISTORY: Ordering Physician Provided Reason for Exam: cp, elevated D-dimer Acuity: Acute Type of Exam: Initial FINDINGS: Pulmonary Arteries: Pulmonary arteries are adequately opacified for evaluation. No evidence of intraluminal filling defect to suggest pulmonary embolism. Main pulmonary artery is normal in caliber. Mediastinum: No evidence of mediastinal lymphadenopathy. The heart and pericardium demonstrate no acute abnormality. There is no acute abnormality of the thoracic aorta. Lungs/pleura: The lungs are without acute process. No focal consolidation or pulmonary edema. No evidence of pleural effusion or pneumothorax. Upper Abdomen: Limited images of the upper abdomen are unremarkable. Soft Tissues/Bones: No acute bone or soft tissue abnormality. No evidence of pulmonary embolism or acute pulmonary abnormality.      Nm Cardiac Stress Test Nuclear Imaging    Result Date: 6/14/2019  EXAMINATION: MYOCARDIAL PERFUSION IMAGING 6/14/2019 8:14 am TECHNIQUE: Rest dose:  16.2 mCi Tc-99m sestamibi intravenously Stress dose:  36.5 mCi Tc-99m sestamibi intravenously Under cardiology supervision, 0.4 mg Lexiscan was infused intravenously prior to injection of the stress dose. SPECT imaging was acquired following injection of the sestamibi. ECG gating was obtained following the stress acquisition. COMPARISON: Cardiac stress test dated 07/28/2018 HISTORY: ORDERING SYSTEM PROVIDED HISTORY: Shortness of breath TECHNOLOGIST PROVIDED HISTORY: Ordering Physician Provided Reason for Exam: Shortness of breath Procedure Type->Rx Ordering Physician Provided Reason for Exam: shortness of breath, chest pain, lightheaded Additional signs and symptoms: diabetic, asthma, emphysema, COPD FINDINGS: Perfusion: Image quality is good with no significant attenuation artifact. There are no fixed or reversible perfusion defects. Summed stress score:  1 Summed rest score:  1 Summed reversibility score:  0 Scores are visually adjusted for potential artifact. TID score:  1.10 (threshold value of 1.39 is used for Lexiscan stress with Tc-99m) Function: End diastolic volume:  60NH Left ventricular ejection fraction:  76% Wall motion abnormalities:  None. Perfusion:  Normal exam Function:  Normal exam Risk stratification:  Low Note on Risk Stratification: The above risk stratification is based on myocardial perfusion findings. Final risk assessment may be adjusted based on other clinical and noninvasive test findings. Risk stratification criteria are adapted from \"Noninvasive Risk Stratification\" criteria from [facility]. al, ACC/AATS/AHA/ASE/ASNC/SCAI/SCCT/STS 2017 Appropriate Use Criteria For Coronary Revascularization in Patients With Stable Ischemic Heart Disease [facility] Volume 69, Issue 17, May 2017 High risk (>3% annual death or MI) 1.  Severe resting LV dysfunction (LVEF >35%) not readily explained by non coronary causes 2. Resting perfusion abnormalities greater than 10% of the myocardium in patients without prior history or evidence of MI 3. Stress-induced perfusion abnormalities encumbering greater than or equal to 10% myocardium or stress segmental scores indicating multiple vascular territories with abnormalities 4. Stress-induced LV dilatation (TID ratio greater than 1.19 for exercise and greater than 1.39 for regadenoson) Intermediate risk (1% to 3% annual death or MI) 1. Mild/moderate resting LV dysfunction (LVEF 35% to 49%) not readily explained by non coronary causes. 2. Resting perfusion abnormalities in 5%-9.9% of the myocardium in patients without a history or prior evidence of MI 3. Stress-induced perfusion abnormality encumbering 5%-9.9% of the myocardium or stress segmental scores indicating 1 vascular territory with abnormalities but without LV dilation 4. Small wall motion abnormality involving 1-2 segments and only 1 coronary bed. Low Risk (Less than 1% annual death or MI) 1. Normal or small myocardial perfusion defect at rest or with stress encumbering less than 5% of the myocardium. LABS:  I have reviewed and interpreted all available lab results.   Labs Reviewed   CBC WITH AUTO DIFFERENTIAL - Abnormal; Notable for the following components:       Result Value    Immature Granulocytes 1 (*)     All other components within normal limits   COMPREHENSIVE METABOLIC PANEL - Abnormal; Notable for the following components:    CREATININE 1.11 (*)     Chloride 97 (*)     Total Bilirubin 0.21 (*)     GFR Non- 49 (*)     GFR  59 (*)     All other components within normal limits   CBC WITH AUTO DIFFERENTIAL - Abnormal; Notable for the following components:    Seg Neutrophils 86 (*)     Lymphocytes 12 (*)     Monocytes 1 (*)     Eosinophils % 0 (*)     Immature Granulocytes 1 (*)     Absolute Lymph # 0.73 (*)     Absolute Mono # 0.08 (*)     All other components within normal limits   BASIC METABOLIC PANEL - Abnormal; Notable for the following components:    Glucose 240 (*)     CREATININE 1.13 (*)     Sodium 133 (*)     Chloride 97 (*)     GFR Non- 48 (*)     GFR  58 (*)     All other components within normal limits   POC GLUCOSE FINGERSTICK - Abnormal; Notable for the following components:    POC Glucose 174 (*)     All other components within normal limits   POC GLUCOSE FINGERSTICK - Abnormal; Notable for the following components:    POC Glucose 243 (*)     All other components within normal limits   POC GLUCOSE FINGERSTICK - Abnormal; Notable for the following components:    POC Glucose 264 (*)     All other components within normal limits   POC GLUCOSE FINGERSTICK - Abnormal; Notable for the following components:    POC Glucose 282 (*)     All other components within normal limits   TROPONIN   LIPASE   D-DIMER, QUANTITATIVE   TROPONIN   TROPONIN   TROPONIN   TROPONIN   TROPONIN   POCT GLUCOSE   POCT GLUCOSE   POCT GLUCOSE       SCREENING TOOLS:    HEART Risk Score for Chest Pain Patients   History and Physical Exam Suspicion Level  (Nausea, Vomiting, Diaphoresis, Radiation, Exertion)   Slightly Suspicious (0 pts)   Moderately Suspicious (1 pt)   Highly Suspicious (2 pts)   EKG Interpretation   Normal (0 pts)   Non-Specific Repolarization Disturbance (1 pt)   Significant ST-Depression (2 pts)   Age of Patient (in years)   = 39 (0 pts)   46-64 (1 pt)   = 65 (2 pts)   Risk Factors   No Risk Factors (0 pts)   1-2 Risk Factors (1 pt)   = 3 Risk Factors (2 pts)   Risk Factors Include:   Hypercholesterolemia   Hypertension   Diabetes Mellitus   Cigarette smoking   Positive family history   Obesity   CAD   (SLE, CKDz, HIV, Cocaine abuse)   Troponin Levels   = Normal Limit (0 pts)   1-3 Times Normal Limit (1 pt)   > 3 Times Normal Limit (2 pts)  TOTAL: 6    Percent Risk for Major Adverse Cardiac Event (MACE)  0-3 pts indicates low risk for MACE   2.5% (DISCHARGE)   4-7 pts indicates moderate risk for MACE  20.3% (OBS)  8-10 pts indicates high risk for MACE  72.7% (EARLY INVASIVE TX)    CDU MEETA / Monisha Myers is a 70 y.o. female who presents with acute right-sided chest pressure with radiation to right neck with associated shortness of breath, etiology unclear    · Stress test today to further evaluate possible CAD as cause of CP and SOB  · IV morphine, p.o. Percocet  · Pt stating she feels more comfortable wearing 2L O2 NC due to SOB, sPO2 >96% on 2L NC  · Continue home medications and pain control  · Monitor vitals, labs, and imaging  · DISPO: pending consults and clinical improvement    CONSULTS:    IP CONSULT TO CARDIOLOGY    PROCEDURES:  Not indicated       PATIENT REFERRED TO:    MD Wiliam Cabezas  [id number] [facility] [id number]  [address] [place] [address]   Emergency Medicine Resident     This dictation was generated by voice recognition computer software. Although all attempts are made to edit the dictation for accuracy, there may be errors in the transcription that are not intended.

## 2019-06-14 NOTE — ED PROVIDER NOTES
[facility] ED  Emergency Department Encounter  Emergency Medicine Resident     Pt Name: Karey Sanchez  MRN: [id number]  Rozinagfceline 1950  Date ofevaluation: 6/13/19  PCP:  Liz Evans MD    [address] [place] [address]       Chief Complaint   Patient presents with    Chest Pain       HISTORY OF PRESENT ILLNESS  (Location/Symptom, Timing/Onset, Context/Setting, Quality, Duration, Modifying Factors, Severity.)      Karey Sanchez is a 70 y.o. female who presents with complaints of right-sided chest pain that is been ongoing since today. She says that the pain started while she was sitting at home. She describes the pain as pressure-like, constant, and radiates to the right neck. She also has associated shortness of breath. She says that she had to use her home oxygen today to alleviate the shortness of breath. She denies any weakness, numbness, tingling. She has no nausea, vomiting, abdominal pain. She denies any lower extremity swelling.   PAST MEDICAL / SURGICAL / SOCIAL / FAMILY HISTORY      has a past medical history of Allergic rhinitis, Anxiety, Arthritis, Asthma, Atrial fibrillation (HCC), Back pain, CAD (coronary artery disease), Caffeine use, CHF (congestive heart failure) ([address] Chronic kidney disease, COPD (chronic obstructive pulmonary disease) ([address] Degeneration of lumbar or lumbosacral intervertebral disc, Depression, DM (diabetes mellitus) ([address] Emphysema of lung ([address] Gastritis, GERD (gastroesophageal reflux disease), Hearing loss, Hematuria, Hiatal hernia, HTN (hypertension), benign, Hypertriglyceridemia, Incontinence of urine, Knee arthropathy, Lumbosacral spondylosis without myelopathy, Migraine headache, Mumps, Neuropathy, Obesity, On home oxygen therapy, HIGINIO on CPAP, Otitis media, Stroke ([address] Tinnitus, Type 2 diabetes mellitus without complication ([address] Type II or unspecified type diabetes mellitus without mention of complication, not stated as uncontrolled, UTI (lower urinary tract infection), and Varicose vein. has a past surgical history that includes Cholecystectomy (); Carpal tunnel release (Right); Tympanoplasty; Dilation & curettage (); Cystocopy (2013); Cataract removal with implant (Bilateral); Tonsillectomy; Incontinence surgery; ablation of dysrhythmic focus (); Upper gastrointestinal endoscopy (2016); eye surgery; Tubal ligation; other surgical history (09/10/15); Insertable Cardiac Monitor (2015); Tympanoplasty; Colonoscopy; Colonoscopy (04/10/2017); pr colsc flx w/removal lesion by hot bx forceps (N/A, 4/10/2017); and Tympanostomy tube placement (2017).     Social History     Socioeconomic History    Marital status: Legally      Spouse name: Not on file    Number of children: Not on file    Years of education: Not on file    Highest education level: Not on file   Occupational History    Occupation: disabled     Employer: N/A   Social Needs    Financial resource strain: Not on file    Food insecurity:     Worry: Not on file     Inability: Not on file    Transportation needs:     Medical: Not on file     Non-medical: Not on file   Tobacco Use    Smoking status: Former Smoker     Packs/day: 3.00     Years: 41.00     Pack years: 123.00     Types: Cigarettes     Last attempt to quit: 2000     Years since quittin.4    Smokeless tobacco: Never Used    Tobacco comment: quit in    Substance and Sexual Activity    Alcohol use: No     Alcohol/week: 0.0 oz    Drug use: No    Sexual activity: Not Currently   Lifestyle    Physical activity:     Days per week: Not on file     Minutes per session: Not on file    Stress: Not on file   Relationships    Social connections:     Talks on phone: Not on file     Gets together: Not on file     Attends Adventism service: Not on file     Active member of club or organization: Not on file     Attends meetings of clubs or organizations: Not on file Relationship status: Not on file    Intimate partner violence:     Fear of current or ex partner: Not on file     Emotionally abused: Not on file     Physically abused: Not on file     Forced sexual activity: Not on file   Other Topics Concern    Not on file   Social History Narrative    Not on file       Family History   Problem Relation Age of Onset    Diabetes Mother     Heart Disease Mother     Stomach Cancer Father     Diabetes Maternal Grandmother         also aunts and uncles (maternal)    Emphysema Sister        Allergies:  Robitussin [guaifenesin]; Codeine; Compazine [prochlorperazine maleate]; Iodides; Morphine; Moxifloxacin; Reglan [metoclopramide]; Avelox [moxifloxacin hcl in nacl]; Cipro xr; and Sulfa antibiotics    Home Medications:  Prior to Admission medications    Medication Sig Start Date End Date Taking? Authorizing Provider   oxyCODONE-acetaminophen (PERCOCET) 5-325 MG per tablet Take 1 tablet by mouth See Admin Instructions for 30 days. Intended supply: 30 days. 1 tab 3-4 times a day prn 6/17/19 7/17/19  Aide Martin APRN - CNP   carvedilol (COREG) 3.125 MG tablet TAKE 1 TAB BY MOUTH TWICE A DAY ( AM AND BEDTIME ) 5/17/19   Lety Lemus MD   magnesium oxide (MAG-OX) 400 MG tablet TAKE 1 TAB BY MOUTH TWICE A DAY ( AM AND BEDTIME ) 5/17/19   Lety Lemus MD   metFORMIN (GLUCOPHAGE) 1000 MG tablet TAKE 1 TAB BY MOUTH TWICE A DAY ( AM AND BEDTIME ) 5/17/19   Lety Lemus MD   ferrous sulfate 325 (65 Fe) MG tablet TAKE 1 TAB BY MOUTH EVERY MORNING 5/17/19   Lety Lemus MD   insulin aspart (NOVOLOG FLEXPEN) 100 UNIT/ML injection pen If <139 - No Insulin; 140-199-2 Units;200-249-4 Units;250-299-6 Units;300-349-8 Units;350-400=10 Units; Above 400-12 Units 5/13/19   Lety Lemus MD   ibuprofen (IBU) 800 MG tablet Take 1 tablet by mouth every 8 hours as needed for Pain 4/14/19   Taryn Godfrey DO   citalopram (CELEXA) 40 MG tablet TAKE 1/2  TAB BY MOUTH TWICE  A DAY 4/2/19   Anju Nato Fairbanks MD   pantoprazole (PROTONIX) 40 MG tablet Take 1 tablet by mouth 2 times daily 3/15/19   King Expose, DO   lidocaine (LMX) 4 % cream Apply topically every 8 hrs as needed for pain 3/7/19   Babs Valdes MD   LANTUS SOLOSTAR 100 UNIT/ML injection pen INJECT 45 UNITS IN THE MORNING ,AND 35 UNITS NIGHTLY 3/4/19   Babs Valdes MD   topiramate (TOPAMAX) 100 MG tablet Take 1 tablet by mouth nightly 1/14/19   Enid Comer APRN - CNP   TRUE METRIX BLOOD GLUCOSE TEST strip THREE TIMES A DAY 12/13/18   Babs Valdes MD   Alcohol Swabs (B-D SINGLE USE SWABS REGULAR) PADS THREE TIMES A DAY 12/12/18   Babs Valdes MD   ULTICARE MINI PEN NEEDLES 31G X 6 MM MISC USE AS DIRECTED 12/12/18   Babs Valdes MD   nystatin (MYCOSTATIN) 989804 UNIT/GM powder Apply 3 times daily. 12/10/18   Babs Valdes MD   furosemide (LASIX) 20 MG tablet Take 1 tablet by mouth daily as needed (weight gain 3 lbs overnight) 11/16/18   Yuliya Javed MD   losartan (COZAAR) 25 MG tablet TAKE 1 TAB BY MOUTH ONCE A DAY 11/6/18   Babs Valdes MD   Lift Chair MISC by Does not apply route Use daily at home to help with ADL's 11/6/18   Babs Valdes MD   clopidogrel (PLAVIX) 75 MG tablet TAKE 1 TAB BY MOUTH ONCE A DAY 10/22/18   Babs Valdes MD   vitamin B-12 (CYANOCOBALAMIN) 1000 MCG tablet Take 1,000 mcg by mouth daily    Historical Provider, MD   nitroGLYCERIN (NITROSTAT) 0.4 MG SL tablet 1 under the tongue as needed for angina, may repeat q5mins for up three doses 8/28/18   Historical Provider, MD   zoster recombinant adjuvanted vaccine (SHINGRIX) 50 MCG SUSR injection 50 MCG IM then repeat 2-6 months.  8/7/18 8/7/19  Babs Valdes MD   ranitidine (ZANTAC) 150 MG tablet TAKE 1 TAB BY MOUTH TWICE A DAY 6/8/18   Babs Valdes MD   Blood Glucose Monitoring Suppl HARMEET Use daily to check BS 3/27/18   Babs Valdes MD   ipratropium-albuterol (DUONEB) 0.5-2.5 (3) MG/3ML SOLN nebulizer solution Inhale 3 mLs into the lungs every 4 hours 2/6/18   Virgene Klinefelter, MD   Melatonin 10 MG TABS Take 10 mg by mouth nightly 10/19/17   Char Mejía MD   Compression Stockings MISC by Does not apply route Pressure between 20 - 25 . 9/11/17   Char Mejía MD   isosorbide mononitrate (IMDUR) 30 MG extended release tablet TAKE 1 TABLET BY MOUTH DAILY 9/30/16   Anurag Colbert MD   ULTICARE SHORT PEN NEEDLES 31G X 8 MM MISC AS DIRECTED 2/5/16   VERONIKA Perez - CNP   docusate sodium (COLACE) 100 MG capsule Take 1 capsule by mouth 2 times daily Please use while taking Percocet 9/10/15   Yair Acharya MD   SYMBICORT 160-4.5 MCG/ACT AERO   2 puffs As needed for sob 5/28/15   Historical Provider, MD   OXYGEN Inhale 3 L into the lungs     Historical Provider, MD       REVIEW OF SYSTEMS    (2-9 systems for level 4, 10 or more for level 5)      Review of Systems   Constitutional: Negative for chills and fever. HENT: Negative for congestion and rhinorrhea. Eyes: Negative for visual disturbance. Respiratory: Positive for shortness of breath. Negative for cough and wheezing. Cardiovascular: Positive for chest pain. Negative for palpitations. Gastrointestinal: Negative for abdominal pain, nausea and vomiting. Genitourinary: Negative for dysuria and hematuria. Musculoskeletal: Negative for arthralgias and myalgias. Skin: Negative for color change and rash. Neurological: Negative for dizziness, weakness, light-headedness, numbness and headaches. Psychiatric/Behavioral: Negative for agitation and confusion. PHYSICAL EXAM   (up to 7 for level 4, 8or more for level 5)      INITIAL VITALS:   BP (!) 150/64   Pulse 77   Temp 97.9 °F (36.6 °C) (Oral)   Resp 19   Ht 5' 2\" (1.575 m)   Wt 255 lb (115.7 kg)   LMP  (LMP Unknown)   SpO2 97%   BMI 46.64 kg/m²     Physical Exam   Constitutional: She is oriented to person, place, and time. She appears well-developed and well-nourished. HENT:   Head: Normocephalic and atraumatic. Eyes: Conjunctivae and EOM are normal.   Neck: Normal range of motion. Cardiovascular: Normal rate, regular rhythm and normal heart sounds. Exam reveals no gallop and no friction rub. No murmur heard. Pulmonary/Chest: Effort normal and breath sounds normal. No respiratory distress. She has no wheezes. She has no rales. Abdominal: Soft. She exhibits no distension. There is no tenderness. There is no rebound. Musculoskeletal: Normal range of motion. She exhibits no edema. Neurological: She is alert and oriented to person, place, and time. Skin: Skin is warm and dry. No rash noted. No erythema. Psychiatric: She has a normal mood and affect. Thought content normal.       DIFFERENTIAL  DIAGNOSIS     PLAN (LABS / IMAGING / EKG):  Orders Placed This Encounter   Procedures    XR CHEST STANDARD (2 VW)    CT Chest Pulmonary Embolism W Contrast    CBC WITH AUTO DIFFERENTIAL    Comprehensive Metabolic Panel    Troponin    LIPASE    D-Dimer, Quantitative    POC Glucose Fingerstick    POC Glucose Fingerstick    EKG 12 Lead    Insert peripheral IV    PATIENT STATUS (FROM ED OR OR/PROCEDURAL) Observation       MEDICATIONS ORDERED:  Orders Placed This Encounter   Medications    diphenhydrAMINE (BENADRYL) tablet 50 mg    predniSONE (DELTASONE) tablet 50 mg    fentaNYL (SUBLIMAZE) injection 50 mcg    predniSONE (DELTASONE) tablet 60 mg    diphenhydrAMINE (BENADRYL) injection 25 mg    iohexol (OMNIPAQUE 350) solution 75 mL    oxyCODONE (ROXICODONE) immediate release tablet 5 mg    nitroGLYCERIN (NITROSTAT) SL tablet 0.4 mg    insulin lispro (HUMALOG) injection vial 4 Units       DDX: ACS, PE, pneumonia, pneumothorax      Heart Score    Heart Score for chest pain patients  History:  Moderately Suspicious  ECG: Non-Specifc repolarization disturbance/LBTB/PM  Patient Age: > 65 years  Risk Factors: > 3 Risk factors or history of atherosclerotic disease*  Troponin: < 1X normal limit  Heart Score Total: (L) >60 mL/min    GFR Comment          GFR Staging NOT REPORTED    Troponin   Result Value Ref Range    Troponin, High Sensitivity 7 0 - 14 ng/L    Troponin T NOT REPORTED <0.03 ng/mL    Troponin Interp NOT REPORTED    LIPASE   Result Value Ref Range    Lipase 49 13 - 60 U/L   D-Dimer, Quantitative   Result Value Ref Range    D-Dimer, Quant 0.61 mg/L FEU   POC Glucose Fingerstick   Result Value Ref Range    POC Glucose 174 (H) 65 - 105 mg/dL   POC Glucose Fingerstick   Result Value Ref Range    POC Glucose 243 (H) 65 - 105 mg/dL       RADIOLOGY:  Xr Chest Standard (2 Vw)    Result Date: 6/13/2019  EXAMINATION: TWO XRAY VIEWS OF THE CHEST 6/13/2019 6:35 pm COMPARISON: March 14, 2019 HISTORY: ORDERING SYSTEM PROVIDED HISTORY: R sided CP TECHNOLOGIST PROVIDED HISTORY: R sided CP FINDINGS: Is loop recorder on the left. The lungs are without acute focal process. There is no effusion or pneumothorax. The cardiomediastinal silhouette is without acute process. The osseous structures are without acute process. No acute process. Ct Chest Pulmonary Embolism W Contrast    Result Date: 6/14/2019  EXAMINATION: CTA OF THE CHEST 6/13/2019 11:31 pm TECHNIQUE: CTA of the chest was performed after the administration of intravenous contrast.  Multiplanar reformatted images are provided for review. MIP images are provided for review. Dose modulation, iterative reconstruction, and/or weight based adjustment of the mA/kV was utilized to reduce the radiation dose to as low as reasonably achievable. COMPARISON: None. HISTORY: ORDERING SYSTEM PROVIDED HISTORY: chest pain, elevated d dimer TECHNOLOGIST PROVIDED HISTORY: Ordering Physician Provided Reason for Exam: cp, elevated D-dimer Acuity: Acute Type of Exam: Initial FINDINGS: Pulmonary Arteries: Pulmonary arteries are adequately opacified for evaluation. No evidence of intraluminal filling defect to suggest pulmonary embolism. Main pulmonary artery is normal in caliber. Mediastinum: No evidence of mediastinal lymphadenopathy. The heart and pericardium demonstrate no acute abnormality. There is no acute abnormality of the thoracic aorta. Lungs/pleura: The lungs are without acute process. No focal consolidation or pulmonary edema. No evidence of pleural effusion or pneumothorax. Upper Abdomen: Limited images of the upper abdomen are unremarkable. Soft Tissues/Bones: No acute bone or soft tissue abnormality. No evidence of pulmonary embolism or acute pulmonary abnormality. EKG  Rate is normal.  Rhythm is sinus. Axis is normal.  No conduction delays. No ST changes. Nonspecific T wave changes. Impression is nonspecific EKG. All EKG's are interpreted by the Emergency Department Physician who either signs or Co-signs this chart in the absence of a cardiologist.    EMERGENCY DEPARTMENT COURSE:  Patient presented to the emergency department for evaluation of chest pain. On initial evaluation, vitals were unremarkable. Lungs were clear to auscultation bilaterally. Heart was regular in rate and rhythm with no MRG. Abdomen is soft, nontender, nondistended. No lower extremity edema was noted. EKG showed nonspecific T wave changes. Cardiac work-up was obtained. Patient was noted to have an elevated d-dimer and CT PA was obtained, but patient had a dye allergy, so she required a 4-hour prep. CT showed no evidence of PE. Cardiac work-up was otherwise unremarkable. Patient was noted to have a heart score of 6. Plan for admission to the ETU for cardiology evaluation. Of note, patient was given steroids for the prep, so will likely need more intensive glucose monitoring. PROCEDURES:  None    Procedures    CONSULTS:  IP CONSULT TO CARDIOLOGY    CRITICAL CARE:  None     FINAL IMPRESSION      1.  Chest pain, unspecified type          DISPOSITION / PLAN     DISPOSITION Admitted 06/14/2019 01:21:27 AM      PATIENT REFERRED TO:  MD Wiliam Acosta [address]:  New Prescriptions    No medications on file       Indiana Lugo MD  Emergency Medicine Resident    (Please note that portions of this note were completed witha voice recognition program.  Efforts were made to edit the dictations but occasionally words are mis-transcribed.)     Indiana Lugo MD  Resident  06/14/19 [id number]

## 2019-06-14 NOTE — PROGRESS NOTES
[facility]  CDU / OBSERVATION eNCOUnter  Attending NOte       I performed a history and physical examination of the patient and discussed management with the resident. I reviewed the residents note and agree with the documented findings and plan of care. Any areas of disagreement are noted on the chart. I was personally present for the key portions of any procedures. I have documented in the chart those procedures where I was not present during the key portions. I have reviewed the nurses notes. I agree with the chief complaint, past medical history, past surgical history, allergies, medications, social and family history as documented unless otherwise noted below. The Family history, social history, and ROS are effectively unchanged since admission unless noted elsewhere in the chart. Patient with ongoing shortness of breath. Stress test negative. Patient is elderly and states she lives alone. Patient has need for continued monitoring overnight but anticipate discharge in the morning provided vital signs stay normal.  Patient comfortable now. Will reassess in the morning. The patient is diabetic. I have reviewed the patient's chart and found the most recent A1c is 6.6. This indicates reasonable control.  Will continue to follow-up with PCP        Manuel Carnes MD  Attending Emergency  Physician

## 2019-06-14 NOTE — ED PROVIDER NOTES
[facility] ED  Emergency Department  Emergency Medicine Resident Sign-out     Care of Mumtaz Rodriguez was assumed from Dr. Noemi Jean Baptiste and is being seen for Chest Pain  . The patient's initial evaluation and plan have been discussed with the prior provider who initially evaluated the patient. EMERGENCY DEPARTMENT COURSE / MEDICAL DECISION MAKING:       MEDICATIONS GIVEN:  Orders Placed This Encounter   Medications    diphenhydrAMINE (BENADRYL) tablet 50 mg    predniSONE (DELTASONE) tablet 50 mg    fentaNYL (SUBLIMAZE) injection 50 mcg    predniSONE (DELTASONE) tablet 60 mg    diphenhydrAMINE (BENADRYL) injection 25 mg    iohexol (OMNIPAQUE 350) solution 75 mL       LABS / RADIOLOGY:     Labs Reviewed   CBC WITH AUTO DIFFERENTIAL - Abnormal; Notable for the following components:       Result Value    Immature Granulocytes 1 (*)     All other components within normal limits   COMPREHENSIVE METABOLIC PANEL - Abnormal; Notable for the following components:    CREATININE 1.11 (*)     Chloride 97 (*)     Total Bilirubin 0.21 (*)     GFR Non- 49 (*)     GFR  59 (*)     All other components within normal limits   POC GLUCOSE FINGERSTICK - Abnormal; Notable for the following components:    POC Glucose 174 (*)     All other components within normal limits   TROPONIN   LIPASE   D-DIMER, QUANTITATIVE       Xr Chest Standard (2 Vw)    Result Date: 6/13/2019  EXAMINATION: TWO XRAY VIEWS OF THE CHEST 6/13/2019 6:35 pm COMPARISON: March 14, 2019 HISTORY: ORDERING SYSTEM PROVIDED HISTORY: R sided CP TECHNOLOGIST PROVIDED HISTORY: R sided CP FINDINGS: Is loop recorder on the left. The lungs are without acute focal process. There is no effusion or pneumothorax. The cardiomediastinal silhouette is without acute process. The osseous structures are without acute process. No acute process.        RECENT VITALS:     Temp: 97.9 °F (36.6 °C),  Pulse: 77, Resp: 19, BP: (!)

## 2019-06-14 NOTE — ED NOTES
Pt returned from CT, IV access lost. Writer at bedside with x3 unsuccessful attempts to establish IV access.  Medic paged     Mau Chang RN  06/13/19 [id number]

## 2019-06-14 NOTE — CARE COORDINATION
Case Management Initial Discharge Plan  Rhiannon Villa,             Met with:patient to discuss discharge plans. Information verified: address, contacts, phone number, , insurance Yes  PCP: Tho Kyle MD  Date of last visit: 1 month ago    Insurance Provider: Ayleen Dual     Discharge Planning    Living Arrangements:  [place]:  Children    Home: apartment, no steps, building has an elevator    Patient able to perform ADL's:Independent    Current Services (outpatient & in home) [facility]  [facility] for skilled and [facility] services. DME equipment: walker, c-pap, scooter, glucometer, nebulizer, shower chair, concentrator  DME provider:     Pharmacy: [facility], medications are delivered to pt's home. Potential Assistance Purchasing Medications:  Yes  Does patient want to participate in local refill/ meds to beds program?  No    Potential Assistance Needed:  Home Care    Patient agreeable to home care: Yes, current with [facility] skilled nursing 2x/week and [facility] 3x/week for 2hours each visit. Freedom of choice provided:  n/a    Prior SNF/Rehab Placement and Facility:   Agreeable to SNF/Rehab: No  [place] of choice provided: n/a   Evaluation: no    Expected Discharge date:  19  Patient expects to be discharged to:  home  Follow Up Appointment: Best Day/ Time: Monday AM    Transportation provider: joy Kay  Transportation arrangements needed for discharge: TBD    Readmission Risk              Risk of Unplanned Readmission:        19             Does patient have a readmission risk score greater than 14?: Yes  If yes, follow-up appointment must be made within 7 days of discharge. Discharge Plan: return to home with current services.           Electronically signed by Naomi Tate RN on 19 at 4:26 PM

## 2019-06-14 NOTE — PROCEDURES
[facility]                  [id number] [place] [address], [place] [address]                              CARDIAC STRESS TEST    PATIENT NAME: Kecia Camejo                :        1950  MED REC NO:   [id number]                             ROOM:       1829  ACCOUNT NO:   [de-identified]                           ADMIT DATE: 2019  PROVIDER:     Opal Ferraro    DATE OF STUDY:  2019    ORDERING PROVIDER:  Adriana Bee    PRIMARY CARE PROVIDER:  JACKIE Song    INTERPRETING PHYSICIAN:  MAY Dee    LEXISCAN STRESS TESTING    The test was explained and consent signed. INDICATIONS:  Shortness of breath. Resting heart rate 81 beats per minute. Resting blood pressure 102/57 mmHg. Infusion heart rate 106 beats per minute. Infusion blood pressure 135/63 mmHg. Protocol:  Lexiscan 0.4 mg. Resting EKG:  Abnormal; repolarization changes, normal sinus rhythm, low  voltage and non-specific T wave changes. Infusion heart response:  Normal.  Infusion blood pressure response:  Appropriate. Infusion EKG:  No changes seen. Chest discomfort:  None during infusion, chest pain with recovery /10. Ischemic EKG changes:  None. IMPRESSION  Negative electrocardiographic portion of the Lexiscan stress test.  Nuclear Medicine report to follow.           Jasson Bauer    D: 2019 13:52:02       T: 2019 13:52:43     HARLEY/TALA  Job#: [id number]     Doc#: Unknown    CC:    ()

## 2019-06-14 NOTE — DISCHARGE INSTR - COC
Immunization History:   Immunization History   Administered Date(s) Administered    Influenza Virus Vaccine 10/18/2012, 10/07/2013, 10/02/2014, 10/20/2015    Influenza Whole 09/01/2010    Influenza, High Dose (Fluzone 65 yrs and older) 11/10/2016, 10/19/2017, 10/08/2018    Pneumococcal 13-valent Conjugate ([id number]) 06/15/2016    Pneumococcal Polysaccharide ([id number]) 01/01/2009, 09/11/2017       Active Problems:  Patient Active Problem List   Diagnosis Code    Chronic pain of left knee M25.562, G89.29    Type 2 diabetes mellitus with diabetic polyneuropathy, with long-term current use of insulin ([facility]) E11.42, Z79.4    Nonintractable migraine, unspecified migraine type G43.009    Coronary artery disease due to lipid rich plaque I25.10, I25.83    DDD (degenerative disc disease), lumbar M51.36    Chronic, continuous use of opioids F11.90    DDD (degenerative disc disease), cervical M50.30    Osteoarthritis of cervical spine M47.812    Occipital neuralgia M54.81    Medication monitoring encounter Z51.81    GERD (gastroesophageal reflux disease) K21.9    HTN (hypertension), benign I10    Hyperlipidemia with target LDL less than 100 E78.5    Insomnia G47.00    Lumbosacral spondylosis without myelopathy M47.817    Sacroiliitis, not elsewhere classified ([facility] [address] M46.1    TIA (transient ischemic attack) G45.9    Carpal tunnel syndrome G56.00    Mixed stress and urge urinary incontinence N39.46    Bilateral low back pain with sciatica M54.40    Intractable migraine with aura without status migrainosus G43.119    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    Anxiety and depression F41.9, F32.9    Chronic migraine without aura without status migrainosus, not intractable G43.709    Pulmonary embolism (HCC) I26.99    Obesity, morbid, BMI 40.0-49.9 ([facility]) E66.01    Nodule of right lung R91.1    Neuropathy G62.9    Obstructive sleep apnea syndrome G47.33    Asthma with COPD ([facility]) J44.9    Gastroesophageal information only, NOT a DME order):  cane and walker  Other Treatments: skilled nursing, [facility]    Patient's personal belongings (please select all that are sent with patient):  None    RN SIGNATURE:  Electronically signed by Modesta Lobo RN on 6/17/19 at 4:56 PM    CASE MANAGEMENT/SOCIAL WORK SECTION    Inpatient Status Date: ***    Readmission Risk Assessment Score:  Readmission Risk              Risk of Unplanned Readmission:        19           Discharging to Facility/ Agency   · Name: [facility]  ·   Address:          [phone]       Fax: [phone]        ·   · :    Dialysis Facility (if applicable)   · Name:  · Address:  · Dialysis Schedule:  · Phone:  · Fax:    / signature: Electronically signed by Camille Weber RN on 6/17/19 at 3:55 PM    PHYSICIAN SECTION    Prognosis: Good    Condition at Discharge: Stable    Rehab Potential (if transferring to Rehab): Good    Recommended Labs or Other Treatments After Discharge:      Physician Certification: I certify the above information and transfer of Anup Nova  is necessary for the continuing treatment of the diagnosis listed and that she requires Home Care for greater 30 days.      Update Admission H&P: No change in H&P    PHYSICIAN SIGNATURE:  Electronically signed by Manuel Carnes MD on 6/17/19 at 3:32 PM

## 2019-06-14 NOTE — ED NOTES
Pt aware of plan of care, pt agreeable to CT scan. Pt resting on cart with call light within reach. NAD noted, RR even and NL. Will continue to monitor.      Carly Vidal RN  06/13/19 2011

## 2019-06-14 NOTE — ED NOTES
Report called to [facility], all questions answered.       Lisha Gutierrez RN  06/14/19 [id number]

## 2019-06-15 LAB
ANION GAP SERPL CALCULATED.3IONS-SCNC: 11 MMOL/L (ref 9–17)
BUN BLDV-MCNC: 26 MG/DL (ref 8–23)
BUN/CREAT BLD: ABNORMAL (ref 9–20)
CALCIUM SERPL-MCNC: 8.7 MG/DL (ref 8.6–10.4)
CHLORIDE BLD-SCNC: 98 MMOL/L (ref 98–107)
CO2: 24 MMOL/L (ref 20–31)
CREAT SERPL-MCNC: 1.4 MG/DL (ref 0.5–0.9)
GFR AFRICAN AMERICAN: 45 ML/MIN
GFR NON-AFRICAN AMERICAN: 37 ML/MIN
GFR SERPL CREATININE-BSD FRML MDRD: ABNORMAL ML/MIN/{1.73_M2}
GFR SERPL CREATININE-BSD FRML MDRD: ABNORMAL ML/MIN/{1.73_M2}
GLUCOSE BLD-MCNC: 121 MG/DL (ref 70–99)
GLUCOSE BLD-MCNC: 174 MG/DL (ref 65–105)
GLUCOSE BLD-MCNC: 198 MG/DL (ref 65–105)
GLUCOSE BLD-MCNC: 224 MG/DL (ref 65–105)
GLUCOSE BLD-MCNC: 77 MG/DL (ref 65–105)
LV EF: 55 %
LVEF MODALITY: NORMAL
POTASSIUM SERPL-SCNC: 4 MMOL/L (ref 3.7–5.3)
SODIUM BLD-SCNC: 133 MMOL/L (ref 135–144)
TROPONIN INTERP: NORMAL
TROPONIN INTERP: NORMAL
TROPONIN T: NORMAL NG/ML
TROPONIN T: NORMAL NG/ML
TROPONIN, HIGH SENSITIVITY: 7 NG/L (ref 0–14)
TROPONIN, HIGH SENSITIVITY: 9 NG/L (ref 0–14)

## 2019-06-15 PROCEDURE — 2700000000 HC OXYGEN THERAPY PER DAY

## 2019-06-15 PROCEDURE — 6360000002 HC RX W HCPCS: Performed by: EMERGENCY MEDICINE

## 2019-06-15 PROCEDURE — 84484 ASSAY OF TROPONIN QUANT: CPT

## 2019-06-15 PROCEDURE — 94660 CPAP INITIATION&MGMT: CPT

## 2019-06-15 PROCEDURE — 6370000000 HC RX 637 (ALT 250 FOR IP): Performed by: EMERGENCY MEDICINE

## 2019-06-15 PROCEDURE — 82947 ASSAY GLUCOSE BLOOD QUANT: CPT

## 2019-06-15 PROCEDURE — 93005 ELECTROCARDIOGRAM TRACING: CPT | Performed by: EMERGENCY MEDICINE

## 2019-06-15 PROCEDURE — 6370000000 HC RX 637 (ALT 250 FOR IP): Performed by: STUDENT IN AN ORGANIZED HEALTH CARE EDUCATION/TRAINING PROGRAM

## 2019-06-15 PROCEDURE — 36415 COLL VENOUS BLD VENIPUNCTURE: CPT

## 2019-06-15 PROCEDURE — 94640 AIRWAY INHALATION TREATMENT: CPT

## 2019-06-15 PROCEDURE — 94761 N-INVAS EAR/PLS OXIMETRY MLT: CPT

## 2019-06-15 PROCEDURE — 96372 THER/PROPH/DIAG INJ SC/IM: CPT

## 2019-06-15 PROCEDURE — 93306 TTE W/DOPPLER COMPLETE: CPT

## 2019-06-15 PROCEDURE — 2580000003 HC RX 258: Performed by: STUDENT IN AN ORGANIZED HEALTH CARE EDUCATION/TRAINING PROGRAM

## 2019-06-15 PROCEDURE — 93005 ELECTROCARDIOGRAM TRACING: CPT | Performed by: STUDENT IN AN ORGANIZED HEALTH CARE EDUCATION/TRAINING PROGRAM

## 2019-06-15 PROCEDURE — 1200000000 HC SEMI PRIVATE

## 2019-06-15 PROCEDURE — 6360000002 HC RX W HCPCS: Performed by: STUDENT IN AN ORGANIZED HEALTH CARE EDUCATION/TRAINING PROGRAM

## 2019-06-15 PROCEDURE — 80048 BASIC METABOLIC PNL TOTAL CA: CPT

## 2019-06-15 PROCEDURE — 2580000003 HC RX 258: Performed by: EMERGENCY MEDICINE

## 2019-06-15 RX ORDER — FENTANYL CITRATE 50 UG/ML
25 INJECTION, SOLUTION INTRAMUSCULAR; INTRAVENOUS ONCE
Status: COMPLETED | OUTPATIENT
Start: 2019-06-15 | End: 2019-06-15

## 2019-06-15 RX ORDER — ORPHENADRINE CITRATE 100 MG/1
100 TABLET, EXTENDED RELEASE ORAL ONCE
Status: COMPLETED | OUTPATIENT
Start: 2019-06-15 | End: 2019-06-15

## 2019-06-15 RX ADMIN — INSULIN GLARGINE 30 UNITS: 100 INJECTION, SOLUTION SUBCUTANEOUS at 00:34

## 2019-06-15 RX ADMIN — INSULIN LISPRO 1 UNITS: 100 INJECTION, SOLUTION INTRAVENOUS; SUBCUTANEOUS at 21:02

## 2019-06-15 RX ADMIN — IPRATROPIUM BROMIDE AND ALBUTEROL SULFATE 3 ML: .5; 3 SOLUTION RESPIRATORY (INHALATION) at 11:48

## 2019-06-15 RX ADMIN — Medication 10 ML: at 21:03

## 2019-06-15 RX ADMIN — SODIUM CHLORIDE: 9 INJECTION, SOLUTION INTRAVENOUS at 10:15

## 2019-06-15 RX ADMIN — MOMETASONE FUROATE AND FORMOTEROL FUMARATE DIHYDRATE 2 PUFF: 200; 5 AEROSOL RESPIRATORY (INHALATION) at 07:30

## 2019-06-15 RX ADMIN — ENOXAPARIN SODIUM 40 MG: 40 INJECTION, SOLUTION INTRAVENOUS; SUBCUTANEOUS at 10:26

## 2019-06-15 RX ADMIN — FENTANYL CITRATE 25 MCG: 50 INJECTION INTRAMUSCULAR; INTRAVENOUS at 17:31

## 2019-06-15 RX ADMIN — MOMETASONE FUROATE AND FORMOTEROL FUMARATE DIHYDRATE 2 PUFF: 200; 5 AEROSOL RESPIRATORY (INHALATION) at 19:29

## 2019-06-15 RX ADMIN — ORPHENADRINE CITRATE 100 MG: 100 TABLET, EXTENDED RELEASE ORAL at 22:00

## 2019-06-15 RX ADMIN — SODIUM CHLORIDE: 9 INJECTION, SOLUTION INTRAVENOUS at 00:07

## 2019-06-15 RX ADMIN — CARVEDILOL 3.12 MG: 3.12 TABLET, FILM COATED ORAL at 17:40

## 2019-06-15 RX ADMIN — INSULIN GLARGINE 30 UNITS: 100 INJECTION, SOLUTION SUBCUTANEOUS at 22:00

## 2019-06-15 RX ADMIN — INSULIN GLARGINE 45 UNITS: 100 INJECTION, SOLUTION SUBCUTANEOUS at 10:26

## 2019-06-15 RX ADMIN — INSULIN LISPRO 2 UNITS: 100 INJECTION, SOLUTION INTRAVENOUS; SUBCUTANEOUS at 17:40

## 2019-06-15 RX ADMIN — Medication 10 MG: at 21:02

## 2019-06-15 RX ADMIN — LOSARTAN POTASSIUM 25 MG: 25 TABLET, FILM COATED ORAL at 10:20

## 2019-06-15 RX ADMIN — INSULIN LISPRO 4 UNITS: 100 INJECTION, SOLUTION INTRAVENOUS; SUBCUTANEOUS at 13:13

## 2019-06-15 RX ADMIN — CITALOPRAM 20 MG: 20 TABLET, FILM COATED ORAL at 10:20

## 2019-06-15 RX ADMIN — IPRATROPIUM BROMIDE AND ALBUTEROL SULFATE 3 ML: .5; 3 SOLUTION RESPIRATORY (INHALATION) at 19:29

## 2019-06-15 RX ADMIN — CARVEDILOL 3.12 MG: 3.12 TABLET, FILM COATED ORAL at 10:21

## 2019-06-15 RX ADMIN — CLOPIDOGREL 75 MG: 75 TABLET, FILM COATED ORAL at 10:22

## 2019-06-15 RX ADMIN — TOPIRAMATE 100 MG: 100 TABLET, FILM COATED ORAL at 21:02

## 2019-06-15 ASSESSMENT — PAIN DESCRIPTION - PROGRESSION
CLINICAL_PROGRESSION: NOT CHANGED

## 2019-06-15 ASSESSMENT — PAIN SCALES - GENERAL
PAINLEVEL_OUTOF10: 8
PAINLEVEL_OUTOF10: 8

## 2019-06-15 ASSESSMENT — PAIN DESCRIPTION - ORIENTATION: ORIENTATION: LOWER

## 2019-06-15 ASSESSMENT — PAIN DESCRIPTION - PAIN TYPE
TYPE: CHRONIC PAIN
TYPE: ACUTE PAIN

## 2019-06-15 ASSESSMENT — PAIN DESCRIPTION - FREQUENCY
FREQUENCY: INTERMITTENT
FREQUENCY: CONTINUOUS

## 2019-06-15 ASSESSMENT — PAIN DESCRIPTION - DESCRIPTORS
DESCRIPTORS: SHARP
DESCRIPTORS: ACHING

## 2019-06-15 ASSESSMENT — PAIN DESCRIPTION - ONSET
ONSET: AWAKENED FROM SLEEP
ONSET: ON-GOING

## 2019-06-15 ASSESSMENT — PAIN DESCRIPTION - LOCATION
LOCATION: GENERALIZED
LOCATION: STERNUM

## 2019-06-15 NOTE — PLAN OF CARE
BRONCHOSPASM/BRONCHOCONSTRICTION     ? IMPROVE AERATION/BREATH SOUNDS  ? ADMINISTER BRONCHODILATOR THERAPY AS APPROPRIATE  ? ASSESS BREATH SOUNDS  ? IMPLEMENT AEROSOL/MDI PROTOCOL  ?    PATIENT EDUCATION AS NEEDED   NONINVASIVE VENTILATION    PROVIDE OPTIMAL VENTILATION/ACCEPTABLE SPO2   IMPLEMENT NONINVASIVE VENTILATION PROTOCOL   MAINTAIN ACCEPTABLE SPO2   ASSESS SKIN INTEGRITY/BREAKDOWN SCORE   PATIENT EDUCATION AS NEEDED   BIPAP AS NEEDED

## 2019-06-15 NOTE — PROGRESS NOTES
Echo done within the past 6 months. Recent echo done at [address] [place] [address] on 3-12-19. Please see results below or view in Care Everywhere in patient chart. Please let us know if it needs repeated. Extension [id number]. Thanks! Mild concentric hypertrophy. The estimated left ventricular ejection   fraction is 55 - 60%. Normal left ventricular ejection fraction. · Trace tricuspid valve regurgitation. Estimated RVSP: <30 mmHg.

## 2019-06-15 NOTE — PLAN OF CARE
Problem: RESPIRATORY  Intervention: Respiratory assessment  Note:   BRONCHOSPASM/BRONCHOCONSTRICTION     ? IMPROVE AERATION/BREATH SOUNDS  ? ADMINISTER BRONCHODILATOR THERAPY AS APPROPRIATE  ? ASSESS BREATH SOUNDS  ? IMPLEMENT AEROSOL/MDI PROTOCOL  ?    PATIENT EDUCATION AS NEEDED

## 2019-06-15 NOTE — PROGRESS NOTES
[facility]  CDU / OBSERVATION eNCOUnter  Attending NOte       I performed a history and physical examination of the patient and discussed management with the resident. I reviewed the residents note and agree with the documented findings and plan of care. Any areas of disagreement are noted on the chart. I was personally present for the key portions of any procedures. I have documented in the chart those procedures where I was not present during the key portions. I have reviewed the nurses notes. I agree with the chief complaint, past medical history, past surgical history, allergies, medications, social and family history as documented unless otherwise noted below. The Family history, social history, and ROS are effectively unchanged since admission unless noted elsewhere in the chart. Patient kept overnight for observation after negative testing. Patient with impaired respirations secondary to COPD and sleep apnea. Patient recently comfortable however she does live alone and had significant anxiety last night after stress testing. For reevaluation this morning. Patient with continued orthostasis type symptoms. Patient had echo ordered. Will follow through on echo results but anticipate needing further evaluation by cardiology tomorrow if not fully improved. Patient states still unsteady walking from bed to bathroom.     Mell Torres MD  Attending Emergency  Physician

## 2019-06-15 NOTE — PROGRESS NOTES
Patient c/o sudden pain to low sternum Reports pain as an 8 . Reports pain as sharp ,constant, . Dr. Juani Tyson informed. Given results of EKG and vitals.

## 2019-06-15 NOTE — PROGRESS NOTES
Pt echo returned, wnl. Prior to discharge, she complained of acute substernal chest pressure of moderate to severe intensity. Vitals taken and stable. EKG obtained showing low voltage, but is unchanged from previous EKG. Will draw stat Troponin and treat pain with IV Fentanyl. Will plan to keep overnight, make inpatient status, and continue to monitor. If Troponin increased, will address accordingly.     Judie Dooley,   4:46 PM  06/15/19

## 2019-06-15 NOTE — PROGRESS NOTES
OBS/CDU   RESIDENT NOTE      Patients PCP is:  Hu Lopez MD        SUBJECTIVE      No acute events overnight. Patient states she is feeling moderately improved since yesterday. She was able to walk to and from the bathroom without oxygen during my evaluation. She was not short of breath during conversation. PHYSICAL EXAM      General: NAD, AO X 3  Heent: EMOI, PERRL  Neck: SUPPLE, NO JVD  Cardiovascular: RRR, S1S2  Pulmonary: CTAB, NO SOB  Abdomen: SOFT, NTTP, ND, +BS  Extremities: +2/4 PULSES DISTAL, NO SWELLING  Neuro / Psych: NO NUMBNESS OR TINGLING, MENTATION AT BASELINE    PERTINENT TEST /EXAMS      I have reviewed all available laboratory results.     MEDICATIONS CURRENT     carvedilol (COREG) tablet 3.125 mg BID WC   citalopram (CELEXA) tablet 40 mg Daily   clopidogrel (PLAVIX) tablet 75 mg Daily   furosemide (LASIX) tablet 20 mg Daily PRN   ipratropium-albuterol (DUONEB) nebulizer solution 3 mL Q4H   insulin glargine (LANTUS) injection vial 30 Units Nightly   insulin glargine (LANTUS) injection vial 45 Units QAM   losartan (COZAAR) tablet 25 mg Daily   mometasone-formoterol (DULERA) 200-5 MCG/ACT inhaler 2 puff BID   topiramate (TOPAMAX) tablet 100 mg Nightly   sodium chloride flush 0.9 % injection 10 mL 2 times per day   sodium chloride flush 0.9 % injection 10 mL PRN   acetaminophen (TYLENOL) tablet 650 mg Q4H PRN   enoxaparin (LOVENOX) injection 40 mg Daily   oxyCODONE-acetaminophen (PERCOCET) 5-325 MG per tablet 1 tablet Q4H PRN   Or    oxyCODONE-acetaminophen (PERCOCET) 5-325 MG per tablet 2 tablet Q4H PRN   insulin lispro (HUMALOG) injection vial 0-12 Units TID WC   insulin lispro (HUMALOG) injection vial 0-6 Units Nightly   0.9 % sodium chloride infusion Continuous   sodium chloride flush 0.9 % injection 10 mL PRN   sodium chloride flush 0.9 % injection 10 mL PRN   glucose (GLUTOSE) 40 % oral gel 15 g PRN   dextrose 50 % IV solution PRN   glucagon (rDNA) injection 1 mg PRN   dextrose 5 % solution PRN   melatonin tablet 10 mg Nightly PRN       All medication charted and reviewed. CONSULTS      IP CONSULT TO CARDIOLOGY    ASSESSMENT/PLAN       Derick Varela is a 70 y.o. female who presents with     1.)  Acute right-sided chest pressure with radiation into right neck with associated shortness of breath, improving, etiology unclear  -Stress test yesterday for further evaluation of possible CAD was low risk  -IV morphine discontinued, p.o. Percocet as needed for pain  - repeat echocardiogram today    2.)  Acute mild kidney injury noted today on BMP, etiology secondary to poor oral intake  - Cr 1.40 today, up from 1.13 yesterday and 1.11 on admission  -IV fluids and encourage p.o. intake    · Continue home medications and pain control  · Monitor vitals, labs, and imaging  · DISPO: pending consults and clinical improvement, likely DC today    --  Danyel Lozano  Emergency Medicine Resident Physician     This dictation was generated by voice recognition computer software. Although all attempts are made to edit the dictation for accuracy, there may be errors in the transcription that are not intended.

## 2019-06-16 ENCOUNTER — APPOINTMENT (OUTPATIENT)
Dept: GENERAL RADIOLOGY | Age: 69
DRG: 313 | End: 2019-06-16
Payer: COMMERCIAL

## 2019-06-16 LAB
ALBUMIN SERPL-MCNC: 3.8 G/DL (ref 3.5–5.2)
ALBUMIN/GLOBULIN RATIO: 1.4 (ref 1–2.5)
ALP BLD-CCNC: 54 U/L (ref 35–104)
ALT SERPL-CCNC: 15 U/L (ref 5–33)
ANION GAP SERPL CALCULATED.3IONS-SCNC: 11 MMOL/L (ref 9–17)
AST SERPL-CCNC: 13 U/L
BILIRUB SERPL-MCNC: <0.1 MG/DL (ref 0.3–1.2)
BILIRUBIN DIRECT: <0.08 MG/DL
BILIRUBIN, INDIRECT: ABNORMAL MG/DL (ref 0–1)
BUN BLDV-MCNC: 26 MG/DL (ref 8–23)
BUN/CREAT BLD: ABNORMAL (ref 9–20)
CALCIUM SERPL-MCNC: 9.1 MG/DL (ref 8.6–10.4)
CHLORIDE BLD-SCNC: 102 MMOL/L (ref 98–107)
CO2: 25 MMOL/L (ref 20–31)
CREAT SERPL-MCNC: 1.09 MG/DL (ref 0.5–0.9)
GFR AFRICAN AMERICAN: >60 ML/MIN
GFR NON-AFRICAN AMERICAN: 50 ML/MIN
GFR SERPL CREATININE-BSD FRML MDRD: ABNORMAL ML/MIN/{1.73_M2}
GFR SERPL CREATININE-BSD FRML MDRD: ABNORMAL ML/MIN/{1.73_M2}
GLOBULIN: ABNORMAL G/DL (ref 1.5–3.8)
GLUCOSE BLD-MCNC: 151 MG/DL (ref 65–105)
GLUCOSE BLD-MCNC: 154 MG/DL (ref 65–105)
GLUCOSE BLD-MCNC: 168 MG/DL (ref 65–105)
GLUCOSE BLD-MCNC: 72 MG/DL (ref 70–99)
LIPASE: 42 U/L (ref 13–60)
POTASSIUM SERPL-SCNC: 4.4 MMOL/L (ref 3.7–5.3)
SODIUM BLD-SCNC: 138 MMOL/L (ref 135–144)
TOTAL PROTEIN: 6.5 G/DL (ref 6.4–8.3)
TROPONIN INTERP: NORMAL
TROPONIN T: NORMAL NG/ML
TROPONIN, HIGH SENSITIVITY: <6 NG/L (ref 0–14)

## 2019-06-16 PROCEDURE — 6370000000 HC RX 637 (ALT 250 FOR IP): Performed by: EMERGENCY MEDICINE

## 2019-06-16 PROCEDURE — 6360000002 HC RX W HCPCS: Performed by: EMERGENCY MEDICINE

## 2019-06-16 PROCEDURE — 94660 CPAP INITIATION&MGMT: CPT

## 2019-06-16 PROCEDURE — 36415 COLL VENOUS BLD VENIPUNCTURE: CPT

## 2019-06-16 PROCEDURE — 80076 HEPATIC FUNCTION PANEL: CPT

## 2019-06-16 PROCEDURE — 1200000000 HC SEMI PRIVATE

## 2019-06-16 PROCEDURE — 83690 ASSAY OF LIPASE: CPT

## 2019-06-16 PROCEDURE — 6370000000 HC RX 637 (ALT 250 FOR IP): Performed by: STUDENT IN AN ORGANIZED HEALTH CARE EDUCATION/TRAINING PROGRAM

## 2019-06-16 PROCEDURE — 94640 AIRWAY INHALATION TREATMENT: CPT

## 2019-06-16 PROCEDURE — 2580000003 HC RX 258: Performed by: EMERGENCY MEDICINE

## 2019-06-16 PROCEDURE — 82947 ASSAY GLUCOSE BLOOD QUANT: CPT

## 2019-06-16 PROCEDURE — 71046 X-RAY EXAM CHEST 2 VIEWS: CPT

## 2019-06-16 PROCEDURE — 2700000000 HC OXYGEN THERAPY PER DAY

## 2019-06-16 PROCEDURE — 80048 BASIC METABOLIC PNL TOTAL CA: CPT

## 2019-06-16 PROCEDURE — 84484 ASSAY OF TROPONIN QUANT: CPT

## 2019-06-16 RX ORDER — PANTOPRAZOLE SODIUM 40 MG/1
40 TABLET, DELAYED RELEASE ORAL
Status: DISCONTINUED | OUTPATIENT
Start: 2019-06-17 | End: 2019-06-17 | Stop reason: HOSPADM

## 2019-06-16 RX ADMIN — OXYCODONE HYDROCHLORIDE AND ACETAMINOPHEN 2 TABLET: 5; 325 TABLET ORAL at 23:26

## 2019-06-16 RX ADMIN — INSULIN LISPRO 2 UNITS: 100 INJECTION, SOLUTION INTRAVENOUS; SUBCUTANEOUS at 12:13

## 2019-06-16 RX ADMIN — IPRATROPIUM BROMIDE AND ALBUTEROL SULFATE 3 ML: .5; 3 SOLUTION RESPIRATORY (INHALATION) at 11:36

## 2019-06-16 RX ADMIN — LOSARTAN POTASSIUM 25 MG: 25 TABLET, FILM COATED ORAL at 10:10

## 2019-06-16 RX ADMIN — INSULIN GLARGINE 30 UNITS: 100 INJECTION, SOLUTION SUBCUTANEOUS at 23:21

## 2019-06-16 RX ADMIN — Medication 10 ML: at 10:12

## 2019-06-16 RX ADMIN — INSULIN LISPRO 1 UNITS: 100 INJECTION, SOLUTION INTRAVENOUS; SUBCUTANEOUS at 23:20

## 2019-06-16 RX ADMIN — CARVEDILOL 3.12 MG: 3.12 TABLET, FILM COATED ORAL at 23:14

## 2019-06-16 RX ADMIN — CARVEDILOL 3.12 MG: 3.12 TABLET, FILM COATED ORAL at 10:03

## 2019-06-16 RX ADMIN — CITALOPRAM 40 MG: 20 TABLET, FILM COATED ORAL at 10:03

## 2019-06-16 RX ADMIN — OXYCODONE HYDROCHLORIDE AND ACETAMINOPHEN 2 TABLET: 5; 325 TABLET ORAL at 11:31

## 2019-06-16 RX ADMIN — INSULIN GLARGINE 45 UNITS: 100 INJECTION, SOLUTION SUBCUTANEOUS at 12:13

## 2019-06-16 RX ADMIN — INSULIN LISPRO 2 UNITS: 100 INJECTION, SOLUTION INTRAVENOUS; SUBCUTANEOUS at 18:09

## 2019-06-16 RX ADMIN — MOMETASONE FUROATE AND FORMOTEROL FUMARATE DIHYDRATE 2 PUFF: 200; 5 AEROSOL RESPIRATORY (INHALATION) at 11:36

## 2019-06-16 RX ADMIN — TOPIRAMATE 100 MG: 100 TABLET, FILM COATED ORAL at 23:14

## 2019-06-16 RX ADMIN — Medication 10 MG: at 23:13

## 2019-06-16 RX ADMIN — IPRATROPIUM BROMIDE AND ALBUTEROL SULFATE 3 ML: .5; 3 SOLUTION RESPIRATORY (INHALATION) at 15:41

## 2019-06-16 RX ADMIN — ENOXAPARIN SODIUM 40 MG: 40 INJECTION, SOLUTION INTRAVENOUS; SUBCUTANEOUS at 10:11

## 2019-06-16 RX ADMIN — CLOPIDOGREL 75 MG: 75 TABLET, FILM COATED ORAL at 10:03

## 2019-06-16 ASSESSMENT — PAIN SCALES - GENERAL
PAINLEVEL_OUTOF10: 7
PAINLEVEL_OUTOF10: 8
PAINLEVEL_OUTOF10: 0

## 2019-06-16 NOTE — PLAN OF CARE
Problem: Infection:  Goal: Will remain free from infection  Description  Will remain free from infection  Outcome: Ongoing     Problem: Safety:  Goal: Free from accidental physical injury  Description  Free from accidental physical injury  Outcome: Ongoing  Goal: Free from intentional harm  Description  Free from intentional harm  Outcome: Ongoing     Problem: Daily Care:  Goal: Daily care needs are met  Description  Daily care needs are met  Outcome: Ongoing     Problem: Pain:  Goal: Patient's pain/discomfort is manageable  Description  Patient's pain/discomfort is manageable  Outcome: Ongoing  Goal: Pain level will decrease  Description  Pain level will decrease  Outcome: Ongoing  Goal: Control of acute pain  Description  Control of acute pain  Outcome: Ongoing  Goal: Control of chronic pain  Description  Control of chronic pain  Outcome: Ongoing     Problem: Skin Integrity:  Goal: Skin integrity will stabilize  Description  Skin integrity will stabilize  Outcome: Ongoing     Problem: Discharge Planning:  Goal: Patients continuum of care needs are met  Description  Patients continuum of care needs are met  Outcome: Ongoing     Problem: Falls - Risk of:  Goal: Will remain free from falls  Description  Will remain free from falls  Outcome: Ongoing  Goal: Absence of physical injury  Description  Absence of physical injury  Outcome: Ongoing

## 2019-06-16 NOTE — PROGRESS NOTES
OBS/CDU   RESIDENT NOTE      Patients PCP is:  Char Mejía MD        SUBJECTIVE      Surgery was planned yesterday. Just prior to discharge, patient describes acute midline chest pain with shortness of breath. EKG obtained without changes. Troponin stable, negative. Plan to keep another night for evaluation this morning. Upon evaluation this morning, patient still states she feels like there is someone punching her in the chest.      PHYSICAL EXAM      General: NAD, AO X 3  Heent: EMOI, PERRL  Neck: SUPPLE, NO JVD  Cardiovascular: RRR, S1S2  Pulmonary: CTAB, NO SOB  Abdomen: SOFT, NTTP, ND, +BS  Extremities: +2/4 PULSES DISTAL, NO SWELLING  Neuro / Psych: NO NUMBNESS OR TINGLING, MENTATION AT BASELINE    PERTINENT TEST /EXAMS      I have reviewed all available laboratory results.     MEDICATIONS CURRENT     [START ON 6/17/2019] pantoprazole (PROTONIX) tablet 40 mg QAM AC   carvedilol (COREG) tablet 3.125 mg BID WC   citalopram (CELEXA) tablet 40 mg Daily   clopidogrel (PLAVIX) tablet 75 mg Daily   furosemide (LASIX) tablet 20 mg Daily PRN   ipratropium-albuterol (DUONEB) nebulizer solution 3 mL Q4H   insulin glargine (LANTUS) injection vial 30 Units Nightly   insulin glargine (LANTUS) injection vial 45 Units QAM   losartan (COZAAR) tablet 25 mg Daily   mometasone-formoterol (DULERA) 200-5 MCG/ACT inhaler 2 puff BID   topiramate (TOPAMAX) tablet 100 mg Nightly   sodium chloride flush 0.9 % injection 10 mL 2 times per day   sodium chloride flush 0.9 % injection 10 mL PRN   acetaminophen (TYLENOL) tablet 650 mg Q4H PRN   enoxaparin (LOVENOX) injection 40 mg Daily   oxyCODONE-acetaminophen (PERCOCET) 5-325 MG per tablet 1 tablet Q4H PRN   Or    oxyCODONE-acetaminophen (PERCOCET) 5-325 MG per tablet 2 tablet Q4H PRN   insulin lispro (HUMALOG) injection vial 0-12 Units TID WC   insulin lispro (HUMALOG) injection vial 0-6 Units Nightly   0.9 % sodium chloride infusion Continuous   sodium chloride flush 0.9 % injection 10 mL PRN   sodium chloride flush 0.9 % injection 10 mL PRN   glucose (GLUTOSE) 40 % oral gel 15 g PRN   dextrose 50 % IV solution PRN   glucagon (rDNA) injection 1 mg PRN   dextrose 5 % solution PRN   melatonin tablet 10 mg Nightly PRN       All medication charted and reviewed. CONSULTS      IP CONSULT TO CARDIOLOGY    ASSESSMENT/PLAN     El Butts is a 70 y.o. female who presents with      1.)  Acute right-sided chest pressure with radiation into right neck with associated shortness of breath, improving, etiology unclear - resolved  -Stress test for further evaluation of possible CAD was low risk  - p.o. Percocet as needed for pain  - repeat echocardiogram yesterday unchanged and wnl     2.)  Acute mild kidney injury noted today on BMP, etiology secondary to poor oral intake  - Cr at baseline  -IV fluids and encourage p.o. Intake    3.)  Acute midline chest pain that feels like someone is punching her in the chest, etiology unclear  -States this is different than the chest pain that she came in with  -EKG is unchanged, troponin negative, at baseline. Stress test and echocardiogram during this admission within normal limits  -Not appear to be cardiac related chest pain  -Due to persistent discomfort of the patient and patient's multiple risk factors, will pursue further work-up today. Repeat another troponin which is negative, lipase which is within normal limits  -We will obtain chest x-ray order nuclear V/Q perfusion scan to evaluate for the possibility of pulmonary embolism. Although I believe that the diagnosis of pulmonary embolism would be low, must consider that patient has been hospitalized, is experiencing shortness of breath. Patient is not tachycardic and there is no right heart strain evident on EKG.   However, will pursue imaging for further evaluation to exclude this as a possibility  - add PO PPI for possibility of alleviating symptoms if related to reflux, although do not feel likely as the symptoms are not related to PO consumption     · Continue home medications and pain control  · Monitor vitals, labs, and imaging  · DISPO: pending consults and clinical improvement      --  Amaury Noe  Emergency Medicine Resident Physician     This dictation was generated by voice recognition computer software. Although all attempts are made to edit the dictation for accuracy, there may be errors in the transcription that are not intended.

## 2019-06-17 ENCOUNTER — APPOINTMENT (OUTPATIENT)
Dept: NUCLEAR MEDICINE | Age: 69
DRG: 313 | End: 2019-06-17
Payer: COMMERCIAL

## 2019-06-17 VITALS
BODY MASS INDEX: 47.81 KG/M2 | OXYGEN SATURATION: 94 % | HEART RATE: 65 BPM | WEIGHT: 259.8 LBS | HEIGHT: 62 IN | TEMPERATURE: 98.2 F | SYSTOLIC BLOOD PRESSURE: 112 MMHG | RESPIRATION RATE: 16 BRPM | DIASTOLIC BLOOD PRESSURE: 56 MMHG

## 2019-06-17 LAB
ANION GAP SERPL CALCULATED.3IONS-SCNC: 13 MMOL/L (ref 9–17)
BUN BLDV-MCNC: 26 MG/DL (ref 8–23)
BUN/CREAT BLD: ABNORMAL (ref 9–20)
CALCIUM SERPL-MCNC: 8.9 MG/DL (ref 8.6–10.4)
CHLORIDE BLD-SCNC: 103 MMOL/L (ref 98–107)
CO2: 25 MMOL/L (ref 20–31)
CREAT SERPL-MCNC: 1.12 MG/DL (ref 0.5–0.9)
EKG ATRIAL RATE: 72 BPM
EKG ATRIAL RATE: 75 BPM
EKG P AXIS: 50 DEGREES
EKG P AXIS: 51 DEGREES
EKG P-R INTERVAL: 126 MS
EKG P-R INTERVAL: 128 MS
EKG Q-T INTERVAL: 368 MS
EKG Q-T INTERVAL: 382 MS
EKG QRS DURATION: 66 MS
EKG QRS DURATION: 70 MS
EKG QTC CALCULATION (BAZETT): 402 MS
EKG QTC CALCULATION (BAZETT): 426 MS
EKG R AXIS: -19 DEGREES
EKG R AXIS: -22 DEGREES
EKG T AXIS: 68 DEGREES
EKG T AXIS: 73 DEGREES
EKG VENTRICULAR RATE: 72 BPM
EKG VENTRICULAR RATE: 75 BPM
GFR AFRICAN AMERICAN: 58 ML/MIN
GFR NON-AFRICAN AMERICAN: 48 ML/MIN
GFR SERPL CREATININE-BSD FRML MDRD: ABNORMAL ML/MIN/{1.73_M2}
GFR SERPL CREATININE-BSD FRML MDRD: ABNORMAL ML/MIN/{1.73_M2}
GLUCOSE BLD-MCNC: 155 MG/DL (ref 65–105)
GLUCOSE BLD-MCNC: 168 MG/DL (ref 65–105)
GLUCOSE BLD-MCNC: 64 MG/DL (ref 70–99)
GLUCOSE BLD-MCNC: 66 MG/DL (ref 65–105)
POTASSIUM SERPL-SCNC: 4 MMOL/L (ref 3.7–5.3)
SODIUM BLD-SCNC: 141 MMOL/L (ref 135–144)

## 2019-06-17 PROCEDURE — 78582 LUNG VENTILAT&PERFUS IMAGING: CPT

## 2019-06-17 PROCEDURE — 6370000000 HC RX 637 (ALT 250 FOR IP): Performed by: EMERGENCY MEDICINE

## 2019-06-17 PROCEDURE — 94660 CPAP INITIATION&MGMT: CPT

## 2019-06-17 PROCEDURE — 2700000000 HC OXYGEN THERAPY PER DAY

## 2019-06-17 PROCEDURE — 6360000002 HC RX W HCPCS: Performed by: EMERGENCY MEDICINE

## 2019-06-17 PROCEDURE — 82947 ASSAY GLUCOSE BLOOD QUANT: CPT

## 2019-06-17 PROCEDURE — 36415 COLL VENOUS BLD VENIPUNCTURE: CPT

## 2019-06-17 PROCEDURE — A9540 TC99M MAA: HCPCS | Performed by: EMERGENCY MEDICINE

## 2019-06-17 PROCEDURE — 94640 AIRWAY INHALATION TREATMENT: CPT

## 2019-06-17 PROCEDURE — A9538 TC99M PYROPHOSPHATE: HCPCS | Performed by: EMERGENCY MEDICINE

## 2019-06-17 PROCEDURE — 80048 BASIC METABOLIC PNL TOTAL CA: CPT

## 2019-06-17 PROCEDURE — 94762 N-INVAS EAR/PLS OXIMTRY CONT: CPT

## 2019-06-17 PROCEDURE — 2580000003 HC RX 258: Performed by: EMERGENCY MEDICINE

## 2019-06-17 PROCEDURE — 3430000000 HC RX DIAGNOSTIC RADIOPHARMACEUTICAL: Performed by: EMERGENCY MEDICINE

## 2019-06-17 PROCEDURE — 93010 ELECTROCARDIOGRAM REPORT: CPT | Performed by: INTERNAL MEDICINE

## 2019-06-17 RX ADMIN — Medication 6.4 MILLICURIE: at 10:35

## 2019-06-17 RX ADMIN — Medication 10 ML: at 08:59

## 2019-06-17 RX ADMIN — PANTOPRAZOLE SODIUM 40 MG: 40 TABLET, DELAYED RELEASE ORAL at 08:59

## 2019-06-17 RX ADMIN — Medication 50 MILLICURIE: at 10:17

## 2019-06-17 RX ADMIN — IPRATROPIUM BROMIDE AND ALBUTEROL SULFATE 3 ML: .5; 3 SOLUTION RESPIRATORY (INHALATION) at 11:40

## 2019-06-17 RX ADMIN — CLOPIDOGREL 75 MG: 75 TABLET, FILM COATED ORAL at 08:59

## 2019-06-17 RX ADMIN — INSULIN GLARGINE 45 UNITS: 100 INJECTION, SOLUTION SUBCUTANEOUS at 08:59

## 2019-06-17 RX ADMIN — MOMETASONE FUROATE AND FORMOTEROL FUMARATE DIHYDRATE 2 PUFF: 200; 5 AEROSOL RESPIRATORY (INHALATION) at 07:54

## 2019-06-17 RX ADMIN — ENOXAPARIN SODIUM 40 MG: 40 INJECTION, SOLUTION INTRAVENOUS; SUBCUTANEOUS at 08:59

## 2019-06-17 RX ADMIN — CARVEDILOL 3.12 MG: 3.12 TABLET, FILM COATED ORAL at 08:59

## 2019-06-17 RX ADMIN — CITALOPRAM 40 MG: 20 TABLET, FILM COATED ORAL at 08:59

## 2019-06-17 RX ADMIN — LOSARTAN POTASSIUM 25 MG: 25 TABLET, FILM COATED ORAL at 08:59

## 2019-06-17 NOTE — PROGRESS NOTES
Kirsten Whitman requires a shower chair due to being confined to difficulty maintaining balance, and is physically incapable of utilizing shower safely without seat. Current body weight is Weight: 259 lb 12.8 oz (117.8 kg).

## 2019-06-17 NOTE — CARE COORDINATION
Transitional planning-talked with patient-states she has home care-doesn't know the company. Called [employer]-not current with them. Called Miss Garcia  with [employer]-she states she has [address]. [phone], fax [phone]. Called [facility]-she is current with them. Wants script for shower chair. Perfect served obs. Doctor. 1655 faxed Order, Face to face to [phone] for shower chair. 1700 faxed AVS and home care order to [facility]. 1800 set up transportation with [employer] # [id number].

## 2019-06-17 NOTE — PROGRESS NOTES
[facility]  CDU / OBSERVATION eNCOUnter  Attending NOte       I performed a history and physical examination of the patient and discussed management with the resident. I reviewed the residents note and agree with the documented findings and plan of care. Any areas of disagreement are noted on the chart. I was personally present for the key portions of any procedures. I have documented in the chart those procedures where I was not present during the key portions. I have reviewed the nurses notes. I agree with the chief complaint, past medical history, past surgical history, allergies, medications, social and family history as documented unless otherwise noted below. The Family history, social history, and ROS are effectively unchanged since admission unless noted elsewhere in the chart. Patient with improvement today. No clear etiology of symptoms but all testing negative. Discussed at some length with patient. Patient is more comfortable with the testing has been done. I do not have a clear etiology for chest pain but all studies have been negative for ominous pathology. Patient does have outpatient follow-up. Patient has transportation and a safe home environment. Good for discharge today.     Azul Corona MD  Attending Emergency  Physician

## 2019-06-17 NOTE — PROGRESS NOTES
OBS/CDU   RESIDENT NOTE      Patients PCP is:  Halima De MD        SUBJECTIVE      No acute events overnight. She states she feels improvement in symptoms this morning. She states there is a dramatic difference from the previous 2 days. PHYSICAL EXAM      General: NAD, AO X 3  Heent: EMOI, PERRL  Neck: SUPPLE, NO JVD  Cardiovascular: RRR, S1S2  Pulmonary: CTAB, NO SOB  Abdomen: SOFT, NTTP, ND, +BS  Extremities: +2/4 PULSES DISTAL, NO SWELLING  Neuro / Psych: NO NUMBNESS OR TINGLING, MENTATION AT BASELINE    PERTINENT TEST /EXAMS      I have reviewed all available laboratory results.     MEDICATIONS CURRENT     pantoprazole (PROTONIX) tablet 40 mg QAM AC   carvedilol (COREG) tablet 3.125 mg BID WC   citalopram (CELEXA) tablet 40 mg Daily   clopidogrel (PLAVIX) tablet 75 mg Daily   furosemide (LASIX) tablet 20 mg Daily PRN   ipratropium-albuterol (DUONEB) nebulizer solution 3 mL Q4H   insulin glargine (LANTUS) injection vial 30 Units Nightly   insulin glargine (LANTUS) injection vial 45 Units QAM   losartan (COZAAR) tablet 25 mg Daily   mometasone-formoterol (DULERA) 200-5 MCG/ACT inhaler 2 puff BID   topiramate (TOPAMAX) tablet 100 mg Nightly   sodium chloride flush 0.9 % injection 10 mL 2 times per day   sodium chloride flush 0.9 % injection 10 mL PRN   acetaminophen (TYLENOL) tablet 650 mg Q4H PRN   enoxaparin (LOVENOX) injection 40 mg Daily   oxyCODONE-acetaminophen (PERCOCET) 5-325 MG per tablet 1 tablet Q4H PRN   Or    oxyCODONE-acetaminophen (PERCOCET) 5-325 MG per tablet 2 tablet Q4H PRN   insulin lispro (HUMALOG) injection vial 0-12 Units TID WC   insulin lispro (HUMALOG) injection vial 0-6 Units Nightly   0.9 % sodium chloride infusion Continuous   sodium chloride flush 0.9 % injection 10 mL PRN   sodium chloride flush 0.9 % injection 10 mL PRN   glucose (GLUTOSE) 40 % oral gel 15 g PRN   dextrose 50 % IV solution PRN   glucagon (rDNA) injection 1 mg PRN   dextrose 5 % solution PRN   melatonin tablet 10 mg Nightly PRN       All medication charted and reviewed. CONSULTS      IP CONSULT TO CARDIOLOGY    ASSESSMENT/PLAN       Bernardino Vallejo is a 70 y.o. female who presents with   1.)  Acute right-sided chest pressure with radiation into right neck with associated shortness of breath, improving, etiology unclear - resolved  -Stress test for further evaluation of possible CAD was low risk  - p.o. Percocet as needed for pain  - repeat echocardiogram yesterday unchanged and wnl     2.)  Acute mild kidney injury noted today on BMP, etiology secondary to poor oral intake  - Cr at baseline  -IV fluids and encourage p.o. Intake     3.)  Acute midline chest pain that feels like someone is punching her in the chest, etiology unclear - resolving  -Different than the chest pain that she came in with  -EKG is unchanged, troponin negative, at baseline. Stress test and echocardiogram during this admission within normal limits  -Not appear to be cardiac related chest pain  -Due to persistent discomfort of the patient and patient's multiple risk factors, will pursue further work-up. Repeat another troponin which is negative, lipase which is within normal limits, LFTs wnl  - addition of PO PPI to cover the possibility of acute reflux contributing to midline chest discomfort  - CXR repeated yesterday no acute pathology  -Nuclear V/Q perfusion scan to evaluate for the possibility of pulmonary embolism. Although I believe that the diagnosis of pulmonary embolism would be low, must consider that patient has been hospitalized, is experiencing shortness of breath. Patient is not tachycardic and there is no right heart strain evident on EKG.   However, will pursue imaging for further evaluation to exclude this as a possibility     · Continue home medications and pain control  · Monitor vitals, labs, and imaging  · DISPO: pending consults and clinical improvement and negative V/Q scan    --  Willie Mckeon  Emergency Medicine Resident Physician     This dictation was generated by voice recognition computer software. Although all attempts are made to edit the dictation for accuracy, there may be errors in the transcription that are not intended.

## 2019-06-18 ENCOUNTER — TELEPHONE (OUTPATIENT)
Dept: FAMILY MEDICINE CLINIC | Age: 69
End: 2019-06-18

## 2019-06-20 DIAGNOSIS — K21.00 GASTROESOPHAGEAL REFLUX DISEASE WITH ESOPHAGITIS: ICD-10-CM

## 2019-06-20 DIAGNOSIS — I10 HTN (HYPERTENSION), BENIGN: Chronic | ICD-10-CM

## 2019-06-20 DIAGNOSIS — E11.42 TYPE 2 DIABETES MELLITUS WITH DIABETIC POLYNEUROPATHY, WITH LONG-TERM CURRENT USE OF INSULIN (HCC): Chronic | ICD-10-CM

## 2019-06-20 DIAGNOSIS — Z79.4 TYPE 2 DIABETES MELLITUS WITH DIABETIC POLYNEUROPATHY, WITH LONG-TERM CURRENT USE OF INSULIN (HCC): Chronic | ICD-10-CM

## 2019-06-20 DIAGNOSIS — E83.42 HYPOMAGNESEMIA: ICD-10-CM

## 2019-06-20 DIAGNOSIS — D64.9 ANEMIA, UNSPECIFIED TYPE: ICD-10-CM

## 2019-06-21 RX ORDER — MAGNESIUM OXIDE 400 MG/1
TABLET ORAL
Qty: 60 TABLET | Refills: 0 | Status: SHIPPED | OUTPATIENT
Start: 2019-06-21 | End: 2019-07-24 | Stop reason: SDUPTHER

## 2019-06-21 RX ORDER — FERROUS SULFATE 325(65) MG
TABLET ORAL
Qty: 30 TABLET | Refills: 0 | Status: SHIPPED | OUTPATIENT
Start: 2019-06-21 | End: 2019-07-24 | Stop reason: SDUPTHER

## 2019-06-21 RX ORDER — CARVEDILOL 3.12 MG/1
TABLET ORAL
Qty: 60 TABLET | Refills: 0 | Status: SHIPPED | OUTPATIENT
Start: 2019-06-21 | End: 2019-07-24 | Stop reason: SDUPTHER

## 2019-06-21 RX ORDER — RANITIDINE 150 MG/1
TABLET ORAL
Qty: 180 TABLET | Refills: 0 | Status: SHIPPED | OUTPATIENT
Start: 2019-06-21 | End: 2019-08-16 | Stop reason: SDUPTHER

## 2019-06-21 NOTE — DISCHARGE SUMMARY
CDU Discharge Summary        Patient:  Jasmyn Christine  YOB: 1950    MRN: [id number]   Acct: [de-identified]    Primary Care Physician: Alyse Farley MD    Admit date:  6/13/2019  5:44 PM  Discharge date: 6/17/2019  6:34 PM     Discharge Diagnoses:     Acute right-sided chest pressure due to unknown etiology. Resolved. Improved with PO Percocet. Echo and stress wnl. Acute midline chest pain which developed during stay. Etiology unclear. Stress and echo wnl. V/Q showed no PE. Acute mild kidney injury secondary to poor oral intake. IVF and increased PO intake improved. Follow-up:  Call today/tomorrow for a follow up appointment with Alyse Farley MD , or return to the Emergency Room with worsening symptoms    Stressed to patient the importance of following up with primary care doctor for further workup/management of symptoms. Pt verbalizes understanding and agrees with plan.     Discharge Medications:  Changes to medications          [facility] Medication Instructions [place]:[id number]    Printed on:06/21/19 1312   Medication Information                      Alcohol Swabs (B-D SINGLE USE SWABS REGULAR) PADS  THREE TIMES A DAY             Blood Glucose Monitoring Suppl HARMEET  Use daily to check BS             carvedilol (COREG) 3.125 MG tablet  TAKE 1 TAB BY MOUTH TWICE A DAY ( AM AND BEDTIME )             citalopram (CELEXA) 40 MG tablet  TAKE 1/2  TAB BY MOUTH TWICE  A DAY             clopidogrel (PLAVIX) 75 MG tablet  TAKE 1 TAB BY MOUTH ONCE A DAY             Compression Stockings MISC  by Does not apply route Pressure between 20 - 25 .             docusate sodium (COLACE) 100 MG capsule  Take 1 capsule by mouth 2 times daily Please use while taking Percocet             ferrous sulfate 325 (65 Fe) MG tablet  TAKE 1 TAB BY MOUTH EVERY MORNING             furosemide (LASIX) 20 MG tablet  Take 1 tablet by mouth daily as needed (weight gain 3 lbs overnight) ibuprofen (IBU) 800 MG tablet  Take 1 tablet by mouth every 8 hours as needed for Pain             insulin aspart (NOVOLOG FLEXPEN) 100 UNIT/ML injection pen  If <139 - No Insulin; 140-199-2 Units;200-249-4 Units;250-299-6 Units;300-349-8 Units;350-400=10 Units; Above 400-12 Units             ipratropium-albuterol (DUONEB) 0.5-2.5 (3) MG/3ML SOLN nebulizer solution  Inhale 3 mLs into the lungs every 4 hours             LANTUS SOLOSTAR 100 UNIT/ML injection pen  INJECT 45 UNITS IN THE MORNING ,AND 35 UNITS NIGHTLY             lidocaine (LMX) 4 % cream  Apply topically every 8 hrs as needed for pain             Lift Chair MISC  by Does not apply route Use daily at home to help with ADL's             losartan (COZAAR) 25 MG tablet  TAKE 1 TAB BY MOUTH ONCE A DAY             magnesium oxide (MAG-OX) 400 MG tablet  TAKE 1 TAB BY MOUTH TWICE A DAY ( AM AND BEDTIME )             Melatonin 10 MG TABS  Take 10 mg by mouth nightly             metFORMIN (GLUCOPHAGE) 1000 MG tablet  TAKE 1 TAB BY MOUTH TWICE A DAY ( AM AND BEDTIME )             nitroGLYCERIN (NITROSTAT) 0.4 MG SL tablet  1 under the tongue as needed for angina, may repeat q5mins for up three doses             nystatin (MYCOSTATIN) 103814 UNIT/GM powder  Apply 3 times daily. oxyCODONE-acetaminophen (PERCOCET) 5-325 MG per tablet  Take 1 tablet by mouth See Admin Instructions for 30 days. Intended supply: 30 days.  1 tab 3-4 times a day prn             OXYGEN  Inhale 3 L into the lungs              pantoprazole (PROTONIX) 40 MG tablet  Take 1 tablet by mouth 2 times daily             ranitidine (ZANTAC) 150 MG tablet  TAKE 1 TAB BY MOUTH TWICE A DAY             SYMBICORT 160-4.5 MCG/ACT AERO    2 puffs As needed for sob             topiramate (TOPAMAX) 100 MG tablet  Take 1 tablet by mouth nightly             TRUE METRIX BLOOD GLUCOSE TEST strip  THREE TIMES A DAY             [employer] MINI PEN NEEDLES 31G X 6 MM MISC  USE AS DIRECTED zoster recombinant adjuvanted vaccine (SHINGRIX) 50 MCG SUSR injection  50 MCG IM then repeat 2-6 months.                  Diet:  No diet orders on file , Advance as tolerated     Activity:  As tolerated    Consultants: IP CONSULT TO CARDIOLOGY  IP CONSULT TO HOME CARE NEEDS    Procedures:  Not indicated     Diagnostic Test:   Results for orders placed or performed during the hospital encounter of 06/13/19   CBC WITH AUTO DIFFERENTIAL   Result Value Ref Range    WBC 8.7 3.5 - 11.3 k/uL    RBC 4.52 3.95 - 5.11 m/uL    Hemoglobin 13.6 11.9 - 15.1 g/dL    Hematocrit 42.7 36.3 - 47.1 %    MCV 94.5 82.6 - 102.9 fL    MCH 30.1 25.2 - 33.5 pg    MCHC 31.9 28.4 - 34.8 g/dL    RDW 13.2 11.8 - 14.4 %    Platelets 089 753 - 597 k/uL    MPV 11.4 8.1 - 13.5 fL    NRBC Automated 0.0 0.0 per 100 WBC    Differential Type NOT REPORTED     Seg Neutrophils 57 36 - 65 %    Lymphocytes 30 24 - 43 %    Monocytes 9 3 - 12 %    Eosinophils % 2 1 - 4 %    Basophils 1 0 - 2 %    Immature Granulocytes 1 (H) 0 %    Segs Absolute 5.04 1.50 - 8.10 k/uL    Absolute Lymph # 2.60 1.10 - 3.70 k/uL    Absolute Mono # 0.77 0.10 - 1.20 k/uL    Absolute Eos # 0.18 0.00 - 0.44 k/uL    Basophils # 0.07 0.00 - 0.20 k/uL    Absolute Immature Granulocyte 0.04 0.00 - 0.30 k/uL    WBC Morphology NOT REPORTED     RBC Morphology NOT REPORTED     Platelet Estimate NOT REPORTED    Comprehensive Metabolic Panel   Result Value Ref Range    Glucose 87 70 - 99 mg/dL    BUN 22 8 - 23 mg/dL    CREATININE 1.11 (H) 0.50 - 0.90 mg/dL    Bun/Cre Ratio NOT REPORTED 9 - 20    Calcium 9.8 8.6 - 10.4 mg/dL    Sodium 138 135 - 144 mmol/L    Potassium 4.2 3.7 - 5.3 mmol/L    Chloride 97 (L) 98 - 107 mmol/L    CO2 27 20 - 31 mmol/L    Anion Gap 14 9 - 17 mmol/L    Alkaline Phosphatase 65 35 - 104 U/L    ALT 19 5 - 33 U/L    AST 15 <32 U/L    Total Bilirubin 0.21 (L) 0.3 - 1.2 mg/dL    Total Protein 7.6 6.4 - 8.3 g/dL    Alb 4.3 3.5 - 5.2 g/dL    Albumin/Globulin Ratio 1.3 1.0 - American 48 (L) >60 mL/min    GFR  58 (L) >60 mL/min    GFR Comment          GFR Staging NOT REPORTED    Troponin   Result Value Ref Range    Troponin, High Sensitivity 7 0 - 14 ng/L    Troponin T NOT REPORTED <0.03 ng/mL    Troponin Interp NOT REPORTED    Troponin   Result Value Ref Range    Troponin, High Sensitivity 7 0 - 14 ng/L    Troponin T NOT REPORTED <0.03 ng/mL    Troponin Interp NOT REPORTED    Troponin   Result Value Ref Range    Troponin, High Sensitivity 9 0 - 14 ng/L    Troponin T NOT REPORTED <0.03 ng/mL    Troponin Interp NOT REPORTED    Troponin   Result Value Ref Range    Troponin, High Sensitivity 9 0 - 14 ng/L    Troponin T NOT REPORTED <0.03 ng/mL    Troponin Interp NOT REPORTED    Basic Metabolic Panel   Result Value Ref Range    Glucose 121 (H) 70 - 99 mg/dL    BUN 26 (H) 8 - 23 mg/dL    CREATININE 1.40 (H) 0.50 - 0.90 mg/dL    Bun/Cre Ratio NOT REPORTED 9 - 20    Calcium 8.7 8.6 - 10.4 mg/dL    Sodium 133 (L) 135 - 144 mmol/L    Potassium 4.0 3.7 - 5.3 mmol/L    Chloride 98 98 - 107 mmol/L    CO2 24 20 - 31 mmol/L    Anion Gap 11 9 - 17 mmol/L    GFR Non-African American 37 (L) >60 mL/min    GFR  45 (L) >60 mL/min    GFR Comment          GFR Staging NOT REPORTED    Troponin   Result Value Ref Range    Troponin, High Sensitivity 7 0 - 14 ng/L    Troponin T NOT REPORTED <0.03 ng/mL    Troponin Interp NOT REPORTED    Basic Metabolic Panel   Result Value Ref Range    Glucose 72 70 - 99 mg/dL    BUN 26 (H) 8 - 23 mg/dL    CREATININE 1.09 (H) 0.50 - 0.90 mg/dL    Bun/Cre Ratio NOT REPORTED 9 - 20    Calcium 9.1 8.6 - 10.4 mg/dL    Sodium 138 135 - 144 mmol/L    Potassium 4.4 3.7 - 5.3 mmol/L    Chloride 102 98 - 107 mmol/L    CO2 25 20 - 31 mmol/L    Anion Gap 11 9 - 17 mmol/L    GFR Non-African American 50 (L) >60 mL/min    GFR African American >60 >60 mL/min    GFR Comment          GFR Staging NOT REPORTED    Troponin   Result Value Ref Range    Troponin, High Sensitivity <6 0 - 14 ng/L    Troponin T NOT REPORTED <0.03 ng/mL    Troponin Interp NOT REPORTED    HEPATIC FUNCTION PANEL   Result Value Ref Range    Alb 3.8 3.5 - 5.2 g/dL    Alkaline Phosphatase 54 35 - 104 U/L    ALT 15 5 - 33 U/L    AST 13 <32 U/L    Total Bilirubin <0.10 (L) 0.3 - 1.2 mg/dL    Bilirubin, Direct <0.08 <0.31 mg/dL    Bilirubin, Indirect CANNOT BE CALCULATED 0.00 - 1.00 mg/dL    Total Protein 6.5 6.4 - 8.3 g/dL    Globulin NOT REPORTED 1.5 - 3.8 g/dL    Albumin/Globulin Ratio 1.4 1.0 - 2.5   LIPASE   Result Value Ref Range    Lipase 42 13 - 60 U/L   Basic Metabolic Panel   Result Value Ref Range    Glucose 64 (L) 70 - 99 mg/dL    BUN 26 (H) 8 - 23 mg/dL    CREATININE 1.12 (H) 0.50 - 0.90 mg/dL    Bun/Cre Ratio NOT REPORTED 9 - 20    Calcium 8.9 8.6 - 10.4 mg/dL    Sodium 141 135 - 144 mmol/L    Potassium 4.0 3.7 - 5.3 mmol/L    Chloride 103 98 - 107 mmol/L    CO2 25 20 - 31 mmol/L    Anion Gap 13 9 - 17 mmol/L    GFR Non-African American 48 (L) >60 mL/min    GFR  58 (L) >60 mL/min    GFR Comment          GFR Staging NOT REPORTED    POC Glucose Fingerstick   Result Value Ref Range    POC Glucose 174 (H) 65 - 105 mg/dL   POC Glucose Fingerstick   Result Value Ref Range    POC Glucose 243 (H) 65 - 105 mg/dL   POC Glucose Fingerstick   Result Value Ref Range    POC Glucose 264 (H) 65 - 105 mg/dL   POC Glucose Fingerstick   Result Value Ref Range    POC Glucose 282 (H) 65 - 105 mg/dL   POC Glucose Fingerstick   Result Value Ref Range    POC Glucose 183 (H) 65 - 105 mg/dL   POC Glucose Fingerstick   Result Value Ref Range    POC Glucose 184 (H) 65 - 105 mg/dL   POC Glucose Fingerstick   Result Value Ref Range    POC Glucose 77 65 - 105 mg/dL   POC Glucose Fingerstick   Result Value Ref Range    POC Glucose 224 (H) 65 - 105 mg/dL   POC Glucose Fingerstick   Result Value Ref Range    POC Glucose 174 (H) 65 - 105 mg/dL   POC Glucose Fingerstick   Result Value Ref Range    POC Glucose 198 structures demonstrate degenerative change. No acute cardiopulmonary process. Xr Chest Standard (2 Vw)    Result Date: 6/13/2019  EXAMINATION: TWO XRAY VIEWS OF THE CHEST 6/13/2019 6:35 pm COMPARISON: March 14, 2019 HISTORY: ORDERING SYSTEM PROVIDED HISTORY: R sided CP TECHNOLOGIST PROVIDED HISTORY: R sided CP FINDINGS: Is loop recorder on the left. The lungs are without acute focal process. There is no effusion or pneumothorax. The cardiomediastinal silhouette is without acute process. The osseous structures are without acute process. No acute process. Nm Lung Vent/perfusion (vq)    Result Date: 6/17/2019  EXAMINATION: NUCLEAR MEDICINE VENTILATION PERFUSION SCAN. 6/17/2019 TECHNIQUE: 50 millicuries aerosolized Tc99m DTPA was administered via mask prior to planar imaging of the lungs in multiple projections. Then, 6.4 millicuries of Tc 77B MAA was administered intravenously prior to planar imaging of the lungs in similar projections. COMPARISON: Chest radiograph June 16, 2019. HISTORY: ORDERING SYSTEM PROVIDED HISTORY: r/o pe TECHNOLOGIST PROVIDED HISTORY: Ordering Physician Provided Reason for Exam: r/o PE, chest pain Additional signs and symptoms: CHF FINDINGS: PERFUSION: Heterogeneous distribution of radiotracer is noted but without segmental defects. VENTILATION: Central deposition of radiotracer is compatible with COPD. CHEST RADIOGRAPH: No focal areas of consolidation or significant effusions on recent chest radiograph. Low probability for pulmonary embolus. Ct Chest Pulmonary Embolism W Contrast    Result Date: 6/14/2019  EXAMINATION: CTA OF THE CHEST 6/13/2019 11:31 pm TECHNIQUE: CTA of the chest was performed after the administration of intravenous contrast.  Multiplanar reformatted images are provided for review. MIP images are provided for review.  Dose modulation, iterative reconstruction, and/or weight based adjustment of the mA/kV was utilized to reduce the radiation dose to as low as reasonably achievable. COMPARISON: None. HISTORY: ORDERING SYSTEM PROVIDED HISTORY: chest pain, elevated d dimer TECHNOLOGIST PROVIDED HISTORY: Ordering Physician Provided Reason for Exam: cp, elevated D-dimer Acuity: Acute Type of Exam: Initial FINDINGS: Pulmonary Arteries: Pulmonary arteries are adequately opacified for evaluation. No evidence of intraluminal filling defect to suggest pulmonary embolism. Main pulmonary artery is normal in caliber. Mediastinum: No evidence of mediastinal lymphadenopathy. The heart and pericardium demonstrate no acute abnormality. There is no acute abnormality of the thoracic aorta. Lungs/pleura: The lungs are without acute process. No focal consolidation or pulmonary edema. No evidence of pleural effusion or pneumothorax. Upper Abdomen: Limited images of the upper abdomen are unremarkable. Soft Tissues/Bones: No acute bone or soft tissue abnormality. No evidence of pulmonary embolism or acute pulmonary abnormality. Nm Cardiac Stress Test Nuclear Imaging    Result Date: 6/14/2019  EXAMINATION: MYOCARDIAL PERFUSION IMAGING 6/14/2019 8:14 am TECHNIQUE: Rest dose:  16.2 mCi Tc-99m sestamibi intravenously Stress dose:  36.5 mCi Tc-99m sestamibi intravenously Under cardiology supervision, 0.4 mg Lexiscan was infused intravenously prior to injection of the stress dose. SPECT imaging was acquired following injection of the sestamibi. ECG gating was obtained following the stress acquisition. COMPARISON: Cardiac stress test dated 07/28/2018 HISTORY: ORDERING SYSTEM PROVIDED HISTORY: Shortness of breath TECHNOLOGIST PROVIDED HISTORY: Ordering Physician Provided Reason for Exam: Shortness of breath Procedure Type->Rx Ordering Physician Provided Reason for Exam: shortness of breath, chest pain, lightheaded Additional signs and symptoms: diabetic, asthma, emphysema, COPD FINDINGS: Perfusion: Image quality is good with no significant attenuation artifact.  There are no fixed or reversible perfusion defects. Summed stress score:  1 Summed rest score:  1 Summed reversibility score:  0 Scores are visually adjusted for potential artifact. TID score:  1.10 (threshold value of 1.39 is used for Lexiscan stress with Tc-99m) Function: End diastolic volume:  49ZZ Left ventricular ejection fraction:  76% Wall motion abnormalities:  None. Perfusion:  Normal exam Function:  Normal exam Risk stratification:  Low Note on Risk Stratification: The above risk stratification is based on myocardial perfusion findings. Final risk assessment may be adjusted based on other clinical and noninvasive test findings. Risk stratification criteria are adapted from \"Noninvasive Risk Stratification\" criteria from [facility]. al, ACC/AATS/AHA/ASE/ASNC/SCAI/SCCT/STS 2017 Appropriate Use Criteria For Coronary Revascularization in Patients With Stable Ischemic Heart Disease [facility] Volume 69, Issue 17, May 2017 High risk (>3% annual death or MI) 1. Severe resting LV dysfunction (LVEF >35%) not readily explained by non coronary causes 2. Resting perfusion abnormalities greater than 10% of the myocardium in patients without prior history or evidence of MI 3. Stress-induced perfusion abnormalities encumbering greater than or equal to 10% myocardium or stress segmental scores indicating multiple vascular territories with abnormalities 4. Stress-induced LV dilatation (TID ratio greater than 1.19 for exercise and greater than 1.39 for regadenoson) Intermediate risk (1% to 3% annual death or MI) 1. Mild/moderate resting LV dysfunction (LVEF 35% to 49%) not readily explained by non coronary causes. 2. Resting perfusion abnormalities in 5%-9.9% of the myocardium in patients without a history or prior evidence of MI 3. Stress-induced perfusion abnormality encumbering 5%-9.9% of the myocardium or stress segmental scores indicating 1 vascular territory with abnormalities but without LV dilation 4.  Small wall motion abnormality involving 1-2 segments and only 1 coronary bed. Low Risk (Less than 1% annual death or MI) 1. Normal or small myocardial perfusion defect at rest or with stress encumbering less than 5% of the myocardium. Physical Exam:    General appearance - NAD, AOx 3   Lungs -CTAB, no R/R/R  Heart - RRR, no M/R/G  Abdomen - Soft, NT/ND  Neurological:  MAEx4, No focal motor deficit, sensory loss  Extremities - Cap refil <2 sec in all ext., no edema  Skin -warm, dry      Hospital Course:  Clinical course has improved, labs and imaging reviewed. Chapis Deutsch originally presented to the hospital on 6/13/2019  5:44 PM. with acute chest pain and acue kidney injury. At that time it was determined that She required further observation and imagine and IVF. She was admitted and labs and imaging were followed daily. Imaging results as above. She is medically stable to be discharged. Disposition: Home    Patient stated that they will not drive themselves home from the hospital if they have gotten pain killers/ narcotics earlier that day and that they will arrange for transportation on their own or work with the  for a ride. Patient counseled NOT to drive while under the influence of narcotics/ pain killers. Condition: Good    Patient stable and ready for discharge home. I have discussed plan of care with patient and they are in understanding. They were instructed to read discharge paperwork. All of their questions and concerns were addressed. Time Spent: 2 day      --  Judie Dooley DO  Emergency Medicine Resident Physician    This dictation was generated by voice recognition computer software. Although all attempts are made to edit the dictation for accuracy, there may be errors in the transcription that are not intended.

## 2019-06-21 NOTE — TELEPHONE ENCOUNTER
Please Approve or Refuse.   Send to Pharmacy per Pt's Request:      Next Visit Date:  6/26/2019   Last Visit Date: 4/24/2019    Hemoglobin A1C (%)   Date Value   04/24/2019 6.6   01/09/2019 6.9 (H)   09/25/2018 7.0 (H)             ( goal A1C is < 7)   BP Readings from Last 3 Encounters:   06/17/19 (!) 112/56   06/13/19 131/64   05/16/19 138/64          (goal 120/80)  BUN   Date Value Ref Range Status   06/17/2019 26 (H) 8 - 23 mg/dL Final     CREATININE   Date Value Ref Range Status   06/17/2019 1.12 (H) 0.50 - 0.90 mg/dL Final     Potassium   Date Value Ref Range Status   06/17/2019 4.0 3.7 - 5.3 mmol/L Final

## 2019-06-26 ENCOUNTER — OFFICE VISIT (OUTPATIENT)
Dept: FAMILY MEDICINE CLINIC | Age: 69
End: 2019-06-26
Payer: COMMERCIAL

## 2019-06-26 VITALS
WEIGHT: 253.6 LBS | HEART RATE: 58 BPM | OXYGEN SATURATION: 93 % | BODY MASS INDEX: 46.67 KG/M2 | DIASTOLIC BLOOD PRESSURE: 65 MMHG | HEIGHT: 62 IN | SYSTOLIC BLOOD PRESSURE: 103 MMHG

## 2019-06-26 DIAGNOSIS — Z23 NEED FOR PROPHYLACTIC VACCINATION AND INOCULATION AGAINST VARICELLA: ICD-10-CM

## 2019-06-26 DIAGNOSIS — M47.817 LUMBOSACRAL SPONDYLOSIS WITHOUT MYELOPATHY: ICD-10-CM

## 2019-06-26 DIAGNOSIS — N18.30 STAGE 3 CHRONIC KIDNEY DISEASE (HCC): ICD-10-CM

## 2019-06-26 DIAGNOSIS — E11.42 TYPE 2 DIABETES MELLITUS WITH DIABETIC POLYNEUROPATHY, WITH LONG-TERM CURRENT USE OF INSULIN (HCC): ICD-10-CM

## 2019-06-26 DIAGNOSIS — Z79.4 TYPE 2 DIABETES MELLITUS WITH DIABETIC POLYNEUROPATHY, WITH LONG-TERM CURRENT USE OF INSULIN (HCC): ICD-10-CM

## 2019-06-26 DIAGNOSIS — F32.A ANXIETY AND DEPRESSION: ICD-10-CM

## 2019-06-26 DIAGNOSIS — R07.89 OTHER CHEST PAIN: Primary | ICD-10-CM

## 2019-06-26 DIAGNOSIS — F41.9 ANXIETY AND DEPRESSION: ICD-10-CM

## 2019-06-26 DIAGNOSIS — Z23 NEED FOR PROPHYLACTIC VACCINATION WITH TETANUS-DIPHTHERIA (TD): ICD-10-CM

## 2019-06-26 PROBLEM — N39.0 RECURRENT UTI: Status: RESOLVED | Noted: 2018-07-16 | Resolved: 2019-06-26

## 2019-06-26 PROCEDURE — 1111F DSCHRG MED/CURRENT MED MERGE: CPT | Performed by: FAMILY MEDICINE

## 2019-06-26 PROCEDURE — 99495 TRANSJ CARE MGMT MOD F2F 14D: CPT | Performed by: FAMILY MEDICINE

## 2019-06-26 RX ORDER — TETANUS AND DIPHTHERIA TOXOIDS ADSORBED 2; 2 [LF]/.5ML; [LF]/.5ML
0.5 INJECTION INTRAMUSCULAR ONCE
Qty: 0.5 ML | Refills: 0 | Status: CANCELLED | OUTPATIENT
Start: 2019-06-26 | End: 2019-06-26

## 2019-06-26 ASSESSMENT — ENCOUNTER SYMPTOMS
CONSTIPATION: 0
CHEST TIGHTNESS: 1
WHEEZING: 0
ABDOMINAL DISTENTION: 0
ABDOMINAL PAIN: 0
RHINORRHEA: 0
VOMITING: 0
COUGH: 0
PHOTOPHOBIA: 0
BACK PAIN: 1
NAUSEA: 0
BLOOD IN STOOL: 0

## 2019-06-26 NOTE — PROGRESS NOTES
Visit Information    Have you changed or started any medications since your last visit including any over-the-counter medicines, vitamins, or herbal medicines? no   Are you having any side effects from any of your medications? -  no  Have you stopped taking any of your medications? Is so, why? -  no    Have you seen any other physician or provider since your last visit? No  Have you had any other diagnostic tests since your last visit? Yes - Records Obtained  Have you been seen in the emergency room and/or had an admission to a hospital since we last saw you? Yes - Records Obtained  Have you had your routine dental cleaning in the past 6 months? no    Have you activated your [employer] account? If not, what are your barriers?  Yes     Patient Care Team:  Lacey Valdivia MD as PCP - General (Family Medicine)  Lacey Valdivia MD as PCP - [facility] Provider  Thee Guzmán MD as Consulting Physician (Urology)  Sue Dias MD as Consulting Physician (Pulmonology)  Low Salgado MD as Consulting Physician (Internal Medicine)  Colleen Andrade (Optometry)  VERONIKA Conti CNP as Nurse Practitioner (Certified Nurse Practitioner)    Medical History Review  Past Medical, Family, and Social History reviewed and does contribute to the patient presenting condition    Health Maintenance   Topic Date Due    DTaP/Tdap/Td vaccine (1 - Tdap) 12/24/1968    Shingles Vaccine (1 of 2) 12/24/1999    Annual Wellness Visit (AWV)  01/21/2019    Diabetic microalbuminuria test  08/07/2019    Lipid screen  09/25/2019    Colon cancer screen colonoscopy  04/10/2020    Diabetic foot exam  04/18/2020    A1C test (Diabetic or Prediabetic)  04/24/2020    Diabetic retinal exam  04/25/2020    Potassium monitoring  06/17/2020    Creatinine monitoring  06/17/2020    Breast cancer screen  09/27/2020    DEXA (modify frequency per FRAX score)  Completed    Flu vaccine  Completed    Pneumococcal 65+ years Vaccine  Completed  Hepatitis C screen  Completed

## 2019-06-26 NOTE — PROGRESS NOTES
Post-Discharge Transitional Care Management Services or Hospital Follow Up      Karey Sanchez   YOB: 1950    Date of Office Visit:  6/26/2019  Date of Hospital Admission: 6/13/19  Date of Hospital Discharge: 6/17/19  Readmission Risk Score(high >=14%.  Medium >=10%):Readmission Risk Score: 22      Care management risk score Rising risk (score 2-5) and Complex Care (Scores >=6): 8     Non face to face  following discharge, date last encounter closed (first attempt may have been earlier): 6/18/2019  4:15 PM 6/18/2019  4:15 PM    Call initiated 2 business days of discharge: Yes     Patient Active Problem List   Diagnosis    Chronic pain of left knee    Type 2 diabetes mellitus with diabetic polyneuropathy, with long-term current use of insulin (HCC)    Nonintractable migraine, unspecified migraine type    Coronary artery disease due to lipid rich plaque    DDD (degenerative disc disease), lumbar    Chronic, continuous use of opioids    DDD (degenerative disc disease), cervical    Osteoarthritis of cervical spine    Occipital neuralgia    Medication monitoring encounter    GERD (gastroesophageal reflux disease)    HTN (hypertension), benign    Hyperlipidemia with target LDL less than 100    Insomnia    Lumbosacral spondylosis without myelopathy    Sacroiliitis, not elsewhere classified ([facility]    Carpal tunnel syndrome    Mixed stress and urge urinary incontinence    Bilateral low back pain with sciatica    Intractable migraine with aura without status migrainosus    Spondylarthrosis    Anxiety and depression    Chronic migraine without aura without status migrainosus, not intractable    Pulmonary embolism (HCC)    Obesity, morbid, BMI 40.0-49.9 ([facility])    Nodule of right lung    Neuropathy    Obstructive sleep apnea syndrome    Asthma with COPD ([address]    Gastroesophageal reflux disease with esophagitis    Morbid obesity with BMI of 40.0-44.9, adult ([address]    Congestive heart failure (HCC)    Permanent atrial fibrillation (HCC)    Intractable abdominal pain    Chest pain    Stage 3 chronic kidney disease (HCC)       Allergies   Allergen Reactions    Robitussin [Guaifenesin] Anaphylaxis    Codeine Swelling    Compazine [Prochlorperazine Maleate] Hives and Swelling    Iodides Itching    Morphine Other (See Comments)     hallucinations    Moxifloxacin      Other reaction(s): Intolerance-unknown  Other reaction(s): Intolerance-unknown    Reglan [Metoclopramide] Nausea Only    Avelox [Moxifloxacin Hcl In Nacl] Rash     Dermatitis      Cipro Xr Rash     dermatitis    Sulfa Antibiotics Rash       Medications listed as ordered at the time of discharge from [facility]   Jena Winn   [place] Medication Instructions GABO:    Printed on:06/26/19 2008   Medication Information                      Alcohol Swabs (B-D SINGLE USE SWABS REGULAR) PADS  THREE TIMES A DAY             Blood Glucose Monitoring Suppl HARMEET  Use daily to check BS             carvedilol (COREG) 3.125 MG tablet  TAKE 1 TAB BY MOUTH TWICE A DAY ( AM AND BEDTIME )             citalopram (CELEXA) 40 MG tablet  TAKE 1/2  TAB BY MOUTH TWICE  A DAY             clopidogrel (PLAVIX) 75 MG tablet  TAKE 1 TAB BY MOUTH ONCE A DAY             Compression Stockings MISC  by Does not apply route Pressure between 20 - 25 .             docusate sodium (COLACE) 100 MG capsule  Take 1 capsule by mouth 2 times daily Please use while taking Percocet             ferrous sulfate 325 (65 Fe) MG tablet  TAKE 1 TAB BY MOUTH IN THE MORNING             furosemide (LASIX) 20 MG tablet  Take 1 tablet by mouth daily as needed (weight gain 3 lbs overnight)             ibuprofen (IBU) 800 MG tablet  Take 1 tablet by mouth every 8 hours as needed for Pain             insulin aspart (NOVOLOG FLEXPEN) 100 UNIT/ML injection pen  If <139 - No Insulin; 140-199-2 Units;200-249-4 Units;250-299-6 Units;300-349-8 Units;350-400=10 Units; Above 400-12 Units             ipratropium-albuterol (DUONEB) 0.5-2.5 (3) MG/3ML SOLN nebulizer solution  Inhale 3 mLs into the lungs every 4 hours             LANTUS SOLOSTAR 100 UNIT/ML injection pen  INJECT 45 UNITS IN THE MORNING ,AND 35 UNITS NIGHTLY             lidocaine (LMX) 4 % cream  Apply topically every 8 hrs as needed for pain             Lift Chair MISC  by Does not apply route Use daily at home to help with ADL's             losartan (COZAAR) 25 MG tablet  TAKE 1 TAB BY MOUTH ONCE A DAY             magnesium oxide (MAG-OX) 400 MG tablet  TAKE 1 TAB BY MOUTH TWICE A DAY ( AM AND BEDTIME )             Melatonin 10 MG TABS  Take 10 mg by mouth nightly             metFORMIN (GLUCOPHAGE) 1000 MG tablet  TAKE 1 TAB BY MOUTH TWICE A DAY ( AM AND BEDTIME )             nitroGLYCERIN (NITROSTAT) 0.4 MG SL tablet  1 under the tongue as needed for angina, may repeat q5mins for up three doses             nystatin (MYCOSTATIN) 414444 UNIT/GM powder  Apply 3 times daily. oxyCODONE-acetaminophen (PERCOCET) 5-325 MG per tablet  Take 1 tablet by mouth See Admin Instructions for 30 days. Intended supply: 30 days. 1 tab 3-4 times a day prn             OXYGEN  Inhale 3 L into the lungs              pantoprazole (PROTONIX) 40 MG tablet  Take 1 tablet by mouth 2 times daily             ranitidine (ZANTAC) 150 MG tablet  TAKE 1 TAB BY MOUTH TWICE A DAY ( AM AND BEDTIME )             SYMBICORT 160-4.5 MCG/ACT AERO    2 puffs As needed for sob             topiramate (TOPAMAX) 100 MG tablet  Take 1 tablet by mouth nightly             TRUE METRIX BLOOD GLUCOSE TEST strip  THREE TIMES A DAY             ULTICARE MINI PEN NEEDLES 31G X 6 MM MISC  USE AS DIRECTED             zoster recombinant adjuvanted vaccine (SHINGRIX) 50 MCG SUSR injection  50 MCG IM then repeat 2-6 months.                    Medications marked \"taking\" at this time  Outpatient Medications Marked as Taking for the 6/26/19 encounter (Office Visit) with Frances Luke Johana Gonsales MD   Medication Sig Dispense Refill    carvedilol (COREG) 3.125 MG tablet TAKE 1 TAB BY MOUTH TWICE A DAY ( AM AND BEDTIME ) 60 tablet 0    magnesium oxide (MAG-OX) 400 MG tablet TAKE 1 TAB BY MOUTH TWICE A DAY ( AM AND BEDTIME ) 60 tablet 0    metFORMIN (GLUCOPHAGE) 1000 MG tablet TAKE 1 TAB BY MOUTH TWICE A DAY ( AM AND BEDTIME ) 60 tablet 0    ferrous sulfate 325 (65 Fe) MG tablet TAKE 1 TAB BY MOUTH IN THE MORNING 30 tablet 0    ranitidine (ZANTAC) 150 MG tablet TAKE 1 TAB BY MOUTH TWICE A DAY ( AM AND BEDTIME ) 180 tablet 0    oxyCODONE-acetaminophen (PERCOCET) 5-325 MG per tablet Take 1 tablet by mouth See Admin Instructions for 30 days. Intended supply: 30 days. 1 tab 3-4 times a day prn 100 tablet 0    insulin aspart (NOVOLOG FLEXPEN) 100 UNIT/ML injection pen If <139 - No Insulin; 140-199-2 Units;200-249-4 Units;250-299-6 Units;300-349-8 Units;350-400=10 Units; Above 400-12 Units 5 pen 3    ibuprofen (IBU) 800 MG tablet Take 1 tablet by mouth every 8 hours as needed for Pain 45 tablet 0    citalopram (CELEXA) 40 MG tablet TAKE 1/2  TAB BY MOUTH TWICE  A DAY 90 tablet 3    pantoprazole (PROTONIX) 40 MG tablet Take 1 tablet by mouth 2 times daily 60 tablet 3    lidocaine (LMX) 4 % cream Apply topically every 8 hrs as needed for pain 45 g 3    LANTUS SOLOSTAR 100 UNIT/ML injection pen INJECT 45 UNITS IN THE MORNING ,AND 35 UNITS NIGHTLY 30 mL 0    topiramate (TOPAMAX) 100 MG tablet Take 1 tablet by mouth nightly 90 tablet 0    TRUE METRIX BLOOD GLUCOSE TEST strip THREE TIMES A DAY 1 each 3    Alcohol Swabs (B-D SINGLE USE SWABS REGULAR) PADS THREE TIMES A  each 0    ULTICARE MINI PEN NEEDLES 31G X 6 MM MISC USE AS DIRECTED 100 each 0    nystatin (MYCOSTATIN) 086125 UNIT/GM powder Apply 3 times daily.  3 Bottle 3    furosemide (LASIX) 20 MG tablet Take 1 tablet by mouth daily as needed (weight gain 3 lbs overnight) 30 tablet 3    losartan (COZAAR) 25 MG tablet TAKE 1 TAB BY MOUTH ONCE A DAY 90 tablet 5    Lift Chair MISC by Does not apply route Use daily at home to help with ADL's 1 each 0    clopidogrel (PLAVIX) 75 MG tablet TAKE 1 TAB BY MOUTH ONCE A DAY 90 tablet 3    nitroGLYCERIN (NITROSTAT) 0.4 MG SL tablet 1 under the tongue as needed for angina, may repeat q5mins for up three doses      zoster recombinant adjuvanted vaccine (SHINGRIX) 50 MCG SUSR injection 50 MCG IM then repeat 2-6 months. 0.5 mL 1    Blood Glucose Monitoring Suppl HARMEET Use daily to check BS 1 Device 0    ipratropium-albuterol (DUONEB) 0.5-2.5 (3) MG/3ML SOLN nebulizer solution Inhale 3 mLs into the lungs every 4 hours 360 mL 2    Melatonin 10 MG TABS Take 10 mg by mouth nightly 90 tablet 3    Compression Stockings MISC by Does not apply route Pressure between 20 - 25 . 1 each 0    docusate sodium (COLACE) 100 MG capsule Take 1 capsule by mouth 2 times daily Please use while taking Percocet 30 capsule 0    SYMBICORT 160-4.5 MCG/ACT AERO   2 puffs As needed for sob      OXYGEN Inhale 3 L into the lungs           Medications patient taking as of now reconciled against medications ordered at time of hospital discharge: Yes    Chief Complaint   Patient presents with    Follow-Up from [address] [place] [address], [facility]. \"NOT LIKE IT WAS WHEN I WAS IN THE HOSPITAL\"    Depression     PT STATES SHE HAS BEEN FEELING THIS WAY FOR A FEW WEEKS. HPI: Patient is here for follow-up on hospital admission, patient was admitted with chest pain reports she had chest pain on right side, going on 3 to 4 days then it got worse. Patient called ambulance and was taken to the ER. The patient reports chest pain radiated to the left side associated with shortness of breath. She denied any nausea vomiting, any diarrhea blood in stools. The pain was about 8 on scale. She had blood work done which was normal except mild YAJAIRA on CKD.   Patient had echocardiogram done which was at her baseline, she had a CT chest which was negative for PE. Patient also had stress test done which was normal.      Depression getting worse, patient reports she is on Celexa which is helping more recently she is stressed, she has son who comes in with her but she is feeling lonely. She also has a visiting nurse who comes 3 times in a week. She denies any bad thoughts. She wants to have counseling never had that done in the past.    Chronic kidney disease mildly worsening, does not see a nephrologist.      Inpatient course: Discharge summary reviewed- see chart. Interval history/Current status: stable    Review of Systems   Constitutional: Positive for activity change. Negative for appetite change, diaphoresis, fatigue, fever and unexpected weight change. HENT: Negative for congestion, ear pain, hearing loss, postnasal drip and rhinorrhea. Eyes: Negative for photophobia. Respiratory: Positive for chest tightness. Negative for cough and wheezing. Cardiovascular: Positive for chest pain. Negative for palpitations and leg swelling. Gastrointestinal: Negative for abdominal distention, abdominal pain, blood in stool, constipation, nausea and vomiting. Endocrine: Negative for polydipsia, polyphagia and polyuria. Genitourinary: Negative for difficulty urinating, flank pain, frequency, urgency and vaginal pain. Musculoskeletal: Positive for arthralgias, back pain, gait problem, joint swelling and myalgias. Skin: Negative for rash and wound. Neurological: Positive for weakness and numbness. Negative for dizziness, light-headedness and headaches. Hematological: Negative for adenopathy. Psychiatric/Behavioral: Positive for behavioral problems, decreased concentration, dysphoric mood and sleep disturbance. Negative for agitation and suicidal ideas. The patient is nervous/anxious.         Vitals:    06/26/19 0920   BP: 103/65   Pulse: 58   SpO2: 93%   Weight: 253 lb 9.6 oz (115 kg)   Height: 5' 2\" (1.575 m)     Body mass index is 46.38 kg/m². Wt Readings from Last 3 Encounters:   06/26/19 253 lb 9.6 oz (115 kg)   06/16/19 259 lb 12.8 oz (117.8 kg)   06/13/19 252 lb (114.3 kg)     BP Readings from Last 3 Encounters:   06/26/19 103/65   06/17/19 (!) 112/56   06/13/19 131/64       Physical Exam   Constitutional: She appears well-developed and well-nourished. HENT:   Head: Normocephalic and atraumatic. Nose: Nose normal. No mucosal edema. Right sinus exhibits no maxillary sinus tenderness and no frontal sinus tenderness. Left sinus exhibits no maxillary sinus tenderness and no frontal sinus tenderness. Mouth/Throat: Oropharynx is clear and moist and mucous membranes are normal. Mucous membranes are not cyanotic. Normal dentition. No oropharyngeal exudate or tonsillar abscesses. Cardiovascular: Normal rate, regular rhythm, S1 normal, S2 normal and normal heart sounds. No murmur heard. Pulmonary/Chest: Effort normal. She has decreased breath sounds in the right lower field and the left lower field. She has no wheezes. She has no rhonchi. She has no rales. Abdominal: Soft. Normal appearance. There is no tenderness. There is no rigidity, no rebound, no guarding, no CVA tenderness and negative Barrientos's sign. Musculoskeletal:        Lumbar back: She exhibits decreased range of motion, tenderness, edema, deformity, pain and spasm. Nursing note and vitals reviewed. Assessment/Plan:  1. Other chest pain  Chest pain has resolved, echocardiogram versus stress test is negative. She follows with cardiologist controlled  - [place] DISCHARGE MEDS RECONCILED W/ CURRENT OUTPATIENT MED LIST    2. Type 2 diabetes mellitus with diabetic polyneuropathy, with long-term current use of insulin ([address]  Controlled continue same medications  - [place] DISCHARGE MEDS RECONCILED W/ CURRENT OUTPATIENT MED LIST    3.  Lumbosacral spondylosis without myelopathy  Stable follows with pain management  - [place] DISCHARGE MEDS RECONCILED W/ CURRENT OUTPATIENT MED LIST    4. Stage 3 chronic kidney disease ([facility] [address]  Worsening referred to nephrologist  - Ambulatory referral to Nephrology  - [place] DISCHARGE MEDS RECONCILED W/ CURRENT OUTPATIENT MED LIST    5. Anxiety and depression  Poorly controlled, continue Celexa discussed about the counseling, patient is willing to start counseling appointment made. - [place] DISCHARGE MEDS RECONCILED W/ CURRENT OUTPATIENT MED LIST    6. Need for prophylactic vaccination and inoculation against varicella      7.  Need for prophylactic vaccination with tetanus-diphtheria (Td)          Medical Decision Making: high complexity

## 2019-07-10 ENCOUNTER — TELEPHONE (OUTPATIENT)
Dept: FAMILY MEDICINE CLINIC | Age: 69
End: 2019-07-10

## 2019-07-16 ENCOUNTER — HOSPITAL ENCOUNTER (OUTPATIENT)
Dept: PAIN MANAGEMENT | Age: 69
Discharge: HOME | End: 2019-07-16
Payer: COMMERCIAL

## 2019-07-16 VITALS
OXYGEN SATURATION: 93 % | HEART RATE: 89 BPM | RESPIRATION RATE: 16 BRPM | TEMPERATURE: 98 F | HEIGHT: 62 IN | SYSTOLIC BLOOD PRESSURE: 149 MMHG | DIASTOLIC BLOOD PRESSURE: 77 MMHG | BODY MASS INDEX: 46.56 KG/M2 | WEIGHT: 253 LBS

## 2019-07-16 DIAGNOSIS — Z51.81 MEDICATION MONITORING ENCOUNTER: ICD-10-CM

## 2019-07-16 DIAGNOSIS — M47.9 SPONDYLARTHROSIS: ICD-10-CM

## 2019-07-16 DIAGNOSIS — M54.16 LUMBAR RADICULOPATHY, CHRONIC: ICD-10-CM

## 2019-07-16 DIAGNOSIS — G89.29 CHRONIC BILATERAL LOW BACK PAIN WITH SCIATICA, SCIATICA LATERALITY UNSPECIFIED: Primary | ICD-10-CM

## 2019-07-16 DIAGNOSIS — M47.817 LUMBOSACRAL SPONDYLOSIS WITHOUT MYELOPATHY: ICD-10-CM

## 2019-07-16 DIAGNOSIS — M50.30 DDD (DEGENERATIVE DISC DISEASE), CERVICAL: Chronic | ICD-10-CM

## 2019-07-16 DIAGNOSIS — M51.36 DDD (DEGENERATIVE DISC DISEASE), LUMBAR: ICD-10-CM

## 2019-07-16 DIAGNOSIS — M54.40 CHRONIC BILATERAL LOW BACK PAIN WITH SCIATICA, SCIATICA LATERALITY UNSPECIFIED: Primary | ICD-10-CM

## 2019-07-16 DIAGNOSIS — M46.1 SACROILIITIS, NOT ELSEWHERE CLASSIFIED (HCC): Chronic | ICD-10-CM

## 2019-07-16 DIAGNOSIS — G43.709 CHRONIC MIGRAINE WITHOUT AURA WITHOUT STATUS MIGRAINOSUS, NOT INTRACTABLE: ICD-10-CM

## 2019-07-16 PROCEDURE — 99213 OFFICE O/P EST LOW 20 MIN: CPT | Performed by: NURSE PRACTITIONER

## 2019-07-16 PROCEDURE — 80307 DRUG TEST PRSMV CHEM ANLYZR: CPT

## 2019-07-16 PROCEDURE — 99213 OFFICE O/P EST LOW 20 MIN: CPT

## 2019-07-16 RX ORDER — TOPIRAMATE 100 MG/1
100 TABLET, FILM COATED ORAL NIGHTLY
Qty: 90 TABLET | Refills: 1 | Status: SHIPPED | OUTPATIENT
Start: 2019-07-16 | End: 2020-03-02 | Stop reason: SDUPTHER

## 2019-07-16 RX ORDER — OXYCODONE HYDROCHLORIDE AND ACETAMINOPHEN 5; 325 MG/1; MG/1
1 TABLET ORAL SEE ADMIN INSTRUCTIONS
Qty: 100 TABLET | Refills: 0 | Status: SHIPPED | OUTPATIENT
Start: 2019-07-18 | End: 2019-08-15 | Stop reason: SDUPTHER

## 2019-07-16 ASSESSMENT — ENCOUNTER SYMPTOMS
RESPIRATORY NEGATIVE: 1
BACK PAIN: 1
GASTROINTESTINAL NEGATIVE: 1

## 2019-07-16 NOTE — PROGRESS NOTES
Detected    Final 06/08/2018  9:45 AM [facility]   Codeine, Urine Not Detected    Final 06/08/2018  9:45 AM [facility]   MDA, Ur Not Detected    Final 06/08/2018  9:45 AM [facility]   Diazepam, Urine Not Detected    Final 06/08/2018  9:45 AM [facility]   Ethyl Glucuronide Ur Not Detected    Final 06/08/2018  9:45 AM [facility]   Fentanyl, Ur Not Detected    Final 06/08/2018  9:45 AM [facility]   Hydrocodone, Urine Not Detected    Final 06/08/2018  9:45 AM [facility]   Hydromorphone, Urine Not Detected    Final 06/08/2018  9:45 AM [facility]   Lorazepam, Urine Not Detected    Final 06/08/2018  9:45 AM [facility]   Marijuana Metab, Ur Not Detected    Final 06/08/2018  9:45 AM [facility]   MDEA, ALMA, Ur Not Detected    Final 06/08/2018  9:45 AM [facility]   MDMA, Urine Not Detected    Final 06/08/2018  9:45 AM [facility]   Meperidine Metab, Ur Not Detected    Final 06/08/2018  9:45 AM [facility]   Methadone, Urine Not Detected    Final 06/08/2018  9:45 AM [facility]   Methamphetamine, Urine Not Detected    Final 06/08/2018  9:45 AM [facility]   Methylphenidate Not Detected    Final 06/08/2018  9:45 AM [facility]   Midazolam, Urine Not Detected    Final 06/08/2018  9:45 AM [facility]   Morphine Urine Not Detected    Final 06/08/2018  9:45 AM [facility]   Norbuprenorphine, Urine Not Detected    Final 06/08/2018  9:45 AM [facility]   Nordiazepam, Urine Not Detected    Final 06/08/2018  9:45 AM [facility]   Norfentanyl, Urine Not Detected    Final 06/08/2018  9:45 AM [facility]   NORHYDROCODONE, URINE Not Detected    Final 06/08/2018  9:45 AM [facility]   Noroxycodone, Urine Not Detected    Final 06/08/2018  9:45 AM [facility]   NOROXYMORPHONE, URINE Not Detected    Final 06/08/2018  9:45 AM [facility]   Oxazepam, Urine Not Detected    Final 06/08/2018  9:45 AM [facility]   Oxycodone Urine Not Detected    Final 06/08/2018  9:45 AM [facility]   Oxymorphone, Urine Not Detected    Final 06/08/2018  9:45 AM [facility]   PCP, Urine Not Detected    Final 06/08/2018  9:45 AM [facility]   Phentermine, Ur Not Detected    Final 06/08/2018  9:45 AM [facility]   Propoxyphene, Urine Not Detected    Final Effort normal.   Musculoskeletal:        Lumbar back: She exhibits decreased range of motion and tenderness. Neurological: She is alert. She has normal strength. Gait abnormal.   Reflex Scores:       Patellar reflexes are 1+ on the right side and 1+ on the left side. Achilles reflexes are 1+ on the right side and 1+ on the left side. Skin:        Psychiatric: She has a normal mood and affect.  Her speech is normal and behavior is normal. Judgment and thought content normal. Cognition and memory are normal.         Assessment:    Problem List Items Addressed This Visit     Spondylarthrosis    Relevant Medications    oxyCODONE-acetaminophen (PERCOCET) 5-325 MG per tablet (Start on 7/18/2019)    Sacroiliitis, not elsewhere classified ([facility] [address] (Chronic)    Relevant Medications    oxyCODONE-acetaminophen (PERCOCET) 5-325 MG per tablet (Start on 7/18/2019)    Medication monitoring encounter (Chronic)    Lumbosacral spondylosis without myelopathy (Chronic)    Relevant Medications    oxyCODONE-acetaminophen (PERCOCET) 5-325 MG per tablet (Start on 7/18/2019)    Lumbar radiculopathy, chronic    Relevant Medications    topiramate (TOPAMAX) 100 MG tablet    oxyCODONE-acetaminophen (PERCOCET) 5-325 MG per tablet (Start on 7/18/2019)    DDD (degenerative disc disease), lumbar    Relevant Medications    oxyCODONE-acetaminophen (PERCOCET) 5-325 MG per tablet (Start on 7/18/2019)    DDD (degenerative disc disease), cervical (Chronic)    Relevant Medications    oxyCODONE-acetaminophen (PERCOCET) 5-325 MG per tablet (Start on 7/18/2019)    Chronic migraine without aura without status migrainosus, not intractable    Relevant Medications    topiramate (TOPAMAX) 100 MG tablet    oxyCODONE-acetaminophen (PERCOCET) 5-325 MG per tablet (Start on 7/18/2019)    Bilateral low back pain with sciatica - Primary    Relevant Medications    topiramate (TOPAMAX) 100 MG tablet    oxyCODONE-acetaminophen (PERCOCET) 5-325 MG per tablet (Start on

## 2019-07-16 NOTE — DISCHARGE INSTR - COC
Benign hypertension with CKD (chronic kidney disease) stage III ([facility]) I12.9, N18.3    Secondary diabetes mellitus with stage 3 chronic kidney disease (GFR 30-59) ([facility]) E13.22, N18.3    CKD (chronic kidney disease) stage 3, GFR 30-59 ml/min ([facility]) N18.3       Isolation/Infection:   Isolation          No Isolation            Nurse Assessment:  Last Vital Signs: LMP  (LMP Unknown)     Last documented pain score (0-10 scale):    Last Weight:   Wt Readings from Last 1 Encounters:   06/26/19 253 lb 9.6 oz (115 kg)     Mental Status:  {IP PT MENTAL STATUS:20030}    IV Access:  { MAY IV ACCESS:327831340}    Nursing Mobility/ADLs:  Walking   {CHP DME QDFC:263688993}  Transfer  {CHP DME ZRZY:900903990}  Bathing  {CHP DME SQPB:122792503}  Dressing  {CHP DME ZZKH:184713060}  Toileting  {CHP DME XVCU:148746011}  Feeding  {CHP DME LIPR:936781014}  Med Admin  {CHP DME XVWW:700011736}  Med Delivery   { MAY MED Delivery:730396648}    Wound Care Documentation and Therapy:        Elimination:  Continence:   · Bowel: {YES / IX:90213}  · Bladder: {YES / LP:67127}  Urinary Catheter: {Urinary Catheter:317229296}   Colostomy/Ileostomy/Ileal Conduit: {YES / LS:32035}       Date of Last BM: ***  No intake or output data in the 24 hours ending 07/16/19 1008  No intake/output data recorded.     Safety Concerns:     [address] [employer] Safety Concerns:121672591}    Impairments/Disabilities:      [address] [employer] Impairments/Disabilities:738774106}    Nutrition Therapy:  Current Nutrition Therapy:   [employer] Diet List:344257827}    Routes of Feeding: {CHP DME Other Feedings:244313611}  Liquids: {Slp liquid thickness:30846}  Daily Fluid Restriction: {CHP DME Yes amt example:411051463}  Last Modified Barium Swallow with Video (Video Swallowing Test): {Done Not Done GCYD:545159697}    Treatments at the Time of Hospital Discharge:   Respiratory Treatments: ***  Oxygen Therapy:  {Therapy; copd oxygen:92831}  Ventilator:    {MH CC Vent BVXI:545194810}    Rehab

## 2019-07-20 LAB
6-ACETYLMORPHINE, UR: NOT DETECTED
7-AMINOCLONAZEPAM, URINE: NOT DETECTED
ALPHA-OH-ALPRAZ, URINE: NOT DETECTED
ALPRAZOLAM, URINE: NOT DETECTED
AMPHETAMINES, URINE: NOT DETECTED
BARBITURATES, URINE: NOT DETECTED
BENZOYLECGONINE, UR: NOT DETECTED
BUPRENORPHINE URINE: NOT DETECTED
CARISOPRODOL, UR: NOT DETECTED
CLONAZEPAM, URINE: NOT DETECTED
CODEINE, URINE: NOT DETECTED
CREATININE URINE: 121.1 MG/DL (ref 20–400)
DIAZEPAM, URINE: NOT DETECTED
DRUGS EXPECTED, UR: NORMAL
EER HI RES INTERP UR: NORMAL
ETHYL GLUCURONIDE UR: NOT DETECTED
FENTANYL URINE: NOT DETECTED
HYDROCODONE, URINE: NOT DETECTED
HYDROMORPHONE, URINE: NOT DETECTED
LORAZEPAM, URINE: NOT DETECTED
MARIJUANA METAB, UR: NOT DETECTED
MDA, UR: NOT DETECTED
MDEA, EVE, UR: NOT DETECTED
MDMA URINE: NOT DETECTED
MEPERIDINE METAB, UR: NOT DETECTED
METHADONE, URINE: NOT DETECTED
METHAMPHETAMINE, URINE: NOT DETECTED
METHYLPHENIDATE: NOT DETECTED
MIDAZOLAM, URINE: NOT DETECTED
MORPHINE URINE: NOT DETECTED
NORBUPRENORPHINE, URINE: NOT DETECTED
NORDIAZEPAM, URINE: NOT DETECTED
NORFENTANYL, URINE: NOT DETECTED
NORHYDROCODONE, URINE: NOT DETECTED
NOROXYCODONE, URINE: NOT DETECTED
NOROXYMORPHONE, URINE: NOT DETECTED
OXAZEPAM, URINE: NOT DETECTED
OXYCODONE URINE: NOT DETECTED
OXYMORPHONE, URINE: NOT DETECTED
PAIN MANAGEMENT DRUG PANEL INTERP, URINE: NORMAL
PAIN MGT DRUG PANEL, HI RES, UR: NORMAL
PCP,URINE: NOT DETECTED
PHENTERMINE, UR: NOT DETECTED
PROPOXYPHENE, URINE: NOT DETECTED
TAPENTADOL, URINE: NOT DETECTED
TAPENTADOL-O-SULFATE, URINE: NOT DETECTED
TEMAZEPAM, URINE: NOT DETECTED
TRAMADOL, URINE: NOT DETECTED
ZOLPIDEM, URINE: NOT DETECTED

## 2019-07-22 RX ORDER — CYCLOBENZAPRINE HCL 10 MG
10 TABLET ORAL 2 TIMES DAILY PRN
Qty: 30 TABLET | Refills: 0 | OUTPATIENT
Start: 2019-07-22 | End: 2019-08-05

## 2019-07-22 NOTE — TELEPHONE ENCOUNTER
Flexeril is high risk medication for falls at her age.   I was sent baclofen to her pharmacy or tizanidine, as muscle relaxants

## 2019-07-22 NOTE — TELEPHONE ENCOUNTER
Patient states that the flexeril is the only one that works for her she does not want to change medications

## 2019-07-24 DIAGNOSIS — D64.9 ANEMIA, UNSPECIFIED TYPE: ICD-10-CM

## 2019-07-24 DIAGNOSIS — I10 HTN (HYPERTENSION), BENIGN: Chronic | ICD-10-CM

## 2019-07-24 DIAGNOSIS — Z79.4 TYPE 2 DIABETES MELLITUS WITH DIABETIC POLYNEUROPATHY, WITH LONG-TERM CURRENT USE OF INSULIN (HCC): Chronic | ICD-10-CM

## 2019-07-24 DIAGNOSIS — E11.42 TYPE 2 DIABETES MELLITUS WITH DIABETIC POLYNEUROPATHY, WITH LONG-TERM CURRENT USE OF INSULIN (HCC): Chronic | ICD-10-CM

## 2019-07-24 DIAGNOSIS — E83.42 HYPOMAGNESEMIA: ICD-10-CM

## 2019-07-24 RX ORDER — CARVEDILOL 3.12 MG/1
TABLET ORAL
Qty: 60 TABLET | Refills: 0 | Status: SHIPPED | OUTPATIENT
Start: 2019-07-24 | End: 2019-08-08 | Stop reason: SDUPTHER

## 2019-07-24 RX ORDER — MAGNESIUM OXIDE 400 MG/1
TABLET ORAL
Qty: 60 TABLET | Refills: 0 | Status: SHIPPED | OUTPATIENT
Start: 2019-07-24 | End: 2019-08-08 | Stop reason: SDUPTHER

## 2019-07-24 RX ORDER — FERROUS SULFATE 325(65) MG
TABLET ORAL
Qty: 30 TABLET | Refills: 0 | Status: SHIPPED | OUTPATIENT
Start: 2019-07-24 | End: 2019-08-08 | Stop reason: SDUPTHER

## 2019-08-02 RX ORDER — PEN NEEDLE, DIABETIC 29 G X1/2"
NEEDLE, DISPOSABLE MISCELLANEOUS
Qty: 100 EACH | Refills: 0 | Status: SHIPPED | OUTPATIENT
Start: 2019-08-02 | End: 2019-08-08 | Stop reason: SDUPTHER

## 2019-08-07 DIAGNOSIS — E11.42 TYPE 2 DIABETES MELLITUS WITH DIABETIC POLYNEUROPATHY, WITH LONG-TERM CURRENT USE OF INSULIN (HCC): Chronic | ICD-10-CM

## 2019-08-07 DIAGNOSIS — Z79.4 TYPE 2 DIABETES MELLITUS WITH DIABETIC POLYNEUROPATHY, WITH LONG-TERM CURRENT USE OF INSULIN (HCC): Chronic | ICD-10-CM

## 2019-08-07 RX ORDER — INSULIN GLARGINE 100 [IU]/ML
INJECTION, SOLUTION SUBCUTANEOUS
Qty: 30 ML | Refills: 0 | Status: SHIPPED | OUTPATIENT
Start: 2019-08-07 | End: 2019-08-08 | Stop reason: SDUPTHER

## 2019-08-15 ENCOUNTER — HOSPITAL ENCOUNTER (OUTPATIENT)
Dept: PAIN MANAGEMENT | Age: 69
Discharge: HOME | End: 2019-08-15
Payer: COMMERCIAL

## 2019-08-15 VITALS
WEIGHT: 253 LBS | HEIGHT: 62 IN | DIASTOLIC BLOOD PRESSURE: 58 MMHG | SYSTOLIC BLOOD PRESSURE: 129 MMHG | RESPIRATION RATE: 16 BRPM | HEART RATE: 71 BPM | TEMPERATURE: 98.2 F | BODY MASS INDEX: 46.56 KG/M2 | OXYGEN SATURATION: 95 %

## 2019-08-15 DIAGNOSIS — G43.009 NONINTRACTABLE MIGRAINE, UNSPECIFIED MIGRAINE TYPE: ICD-10-CM

## 2019-08-15 DIAGNOSIS — M51.36 DDD (DEGENERATIVE DISC DISEASE), LUMBAR: ICD-10-CM

## 2019-08-15 DIAGNOSIS — M47.892 OTHER OSTEOARTHRITIS OF SPINE, CERVICAL REGION: Chronic | ICD-10-CM

## 2019-08-15 DIAGNOSIS — M25.562 CHRONIC PAIN OF LEFT KNEE: ICD-10-CM

## 2019-08-15 DIAGNOSIS — M54.81 BILATERAL OCCIPITAL NEURALGIA: Chronic | ICD-10-CM

## 2019-08-15 DIAGNOSIS — M47.817 LUMBOSACRAL SPONDYLOSIS WITHOUT MYELOPATHY: Chronic | ICD-10-CM

## 2019-08-15 DIAGNOSIS — M54.16 LUMBAR RADICULOPATHY, CHRONIC: ICD-10-CM

## 2019-08-15 DIAGNOSIS — G89.29 CHRONIC BILATERAL LOW BACK PAIN WITH SCIATICA, SCIATICA LATERALITY UNSPECIFIED: ICD-10-CM

## 2019-08-15 DIAGNOSIS — M47.9 SPONDYLARTHROSIS: ICD-10-CM

## 2019-08-15 DIAGNOSIS — M46.1 SACROILIITIS, NOT ELSEWHERE CLASSIFIED (HCC): Chronic | ICD-10-CM

## 2019-08-15 DIAGNOSIS — Z01.812 PRE-PROCEDURE LAB EXAM: ICD-10-CM

## 2019-08-15 DIAGNOSIS — Z51.81 MEDICATION MONITORING ENCOUNTER: Chronic | ICD-10-CM

## 2019-08-15 DIAGNOSIS — M50.30 DDD (DEGENERATIVE DISC DISEASE), CERVICAL: Primary | Chronic | ICD-10-CM

## 2019-08-15 DIAGNOSIS — M54.40 CHRONIC BILATERAL LOW BACK PAIN WITH SCIATICA, SCIATICA LATERALITY UNSPECIFIED: ICD-10-CM

## 2019-08-15 DIAGNOSIS — G89.29 CHRONIC PAIN OF LEFT KNEE: ICD-10-CM

## 2019-08-15 PROCEDURE — 99213 OFFICE O/P EST LOW 20 MIN: CPT

## 2019-08-15 PROCEDURE — 99213 OFFICE O/P EST LOW 20 MIN: CPT | Performed by: NURSE PRACTITIONER

## 2019-08-15 RX ORDER — OXYCODONE HYDROCHLORIDE AND ACETAMINOPHEN 5; 325 MG/1; MG/1
1 TABLET ORAL SEE ADMIN INSTRUCTIONS
Qty: 100 TABLET | Refills: 0 | Status: SHIPPED | OUTPATIENT
Start: 2019-08-17 | End: 2019-09-13 | Stop reason: SDUPTHER

## 2019-08-15 ASSESSMENT — ENCOUNTER SYMPTOMS
NAUSEA: 1
BACK PAIN: 1

## 2019-08-15 NOTE — PROGRESS NOTES
Snehal Ramsey PROGRESS NOTE      Patient here today to review Medication Agreement    Chief Complaint: low back pain      HPI: She c/o low back pain which at times has increased. She had a lumbar epidural injection 3/2019 L2, L3 with 95% relief on the left side,  She does some walking in her aprtment. She states her sleep is good. No Ed visits. Back Pain   This is a chronic problem. The current episode started more than 1 year ago. The problem occurs constantly. The problem has been gradually worsening since onset. The pain is present in the lumbar spine. Radiates to: down posterior legs  The pain is at a severity of 7/10. The pain is worse during the night. Exacerbated by: certain positions. Associated symptoms include headaches, numbness and weakness. (Legs) Risk factors include sedentary lifestyle, menopause and obesity. She has tried analgesics, ice and home exercises for the symptoms. The treatment provided mild relief. Patient denies any new neurological symptoms. No bowel or bladder incontinence, no weakness, and no falling. Treatment goals:reduce pain 5  Functional status:         Aberrancy:   Any alcoholic beverages   no    Any illegal drugs   no        Analgesia: pain 7        Adverse  Effects :BM daily     ADL;s :home exercises,           Pill count: appropriate     Morphine equivalent dose as reported on [place]:26.79  Periodic Controlled Substance Monitoring: Possible medication side effects, risk of tolerance/dependence & alternative treatments discussed., No signs of potential drug abuse or diversion identified. , Assessed functional status., Obtaining appropriate analgesic effect of treatment. , Random urine drug screen sent today. Gwen Zelaya, APRN - CNP)  Review of[place]RS does not show any aberrant prescription behavior. Medication is helping the patient stay active. Patient denies any side effects and reports adequate analgesia.  No sign of misuse/abuse        Pill count: appropriate    Morphine equivalent dose as reported on [place]:22.06  Periodic Controlled Substance Monitoring: Possible medication side effects, risk of tolerance/dependence & alternative treatments discussed., No signs of potential drug abuse or diversion identified. , Assessed functional status., Obtaining appropriate analgesic effect of treatment. Samuel Poster, APRN - CNP)  Review of[place] does not show any aberrant prescription behavior. Medication is helping the patient stay active. Patient denies any side effects and reports adequate analgesia. No sign of misuse/abuse. When was thelast UDS:     7-16-19        Was the UDS appropriate:      Record/Diagnostics Review:      As above, I did review the imaging    7/20/2019  1:01 AM - Justino Rhodes Incoming Lab Results From "[facility]     Component Value Ref Range & Units Status Collected Lab   Pain Management Drug Panel Interp, Urine Consistent   Final 07/16/2019 10:00 AM [facility]   (NOTE)   ________________________________________________________________   DRUGS EXPECTED:   OXYCODONE [7/14/19]   ________________________________________________________________   CONSISTENT with medications provided:   OXYCODONE : based on the absence of oxycodone and metabolites   ________________________________________________________________   INTERPRETIVE INFORMATION: [facility] [place], [place] Spec/EMIT, Ur,                            Interp   Interpretation depends on accuracy and completeness of patient   medication information submitted by client.     6-Acetylmorphine, Ur Not Detected   Final 07/16/2019 10:00 AM [facility]   7-Aminoclonazepam, Urine Not Detected   Final 07/16/2019 10:00 AM [facility]   Alpha-OH-Alpraz, Urine Not Detected   Final 07/16/2019 10:00 AM [facility]   Alprazolam, Urine Not Detected   Final 07/16/2019 10:00 AM [facility]   Amphetamines, urine Not Detected   Final 07/16/2019 10:00 AM [facility]   Barbiturates, Ur Not Detected   Final 07/16/2019 10:00 AM [facility]   Benzoylecgonine, Ur Not Detected   [START ON 8/17/2019] oxyCODONE-acetaminophen (PERCOCET) 5-325 MG per tablet, Take 1 tablet by mouth See Admin Instructions for 30 days. Intended supply: 30 days. 1 tab 3-4 times a day prn, Disp: 100 tablet, Rfl: 0    ferrous sulfate 325 (65 Fe) MG tablet, TAKE 1 TAB BY MOUTH EVERY MORNING, Disp: 90 tablet, Rfl: 5    magnesium oxide (MAG-OX) 400 MG tablet, TAKE 1 TAB BY MOUTH TWICE A DAY ( AM AND BEDTIME ), Disp: 180 tablet, Rfl: 3    carvedilol (COREG) 3.125 MG tablet, TAKE 1 TAB BY MOUTH TWICE A DAY ( AM AND BEDTIME ), Disp: 180 tablet, Rfl: 3    metFORMIN (GLUCOPHAGE) 1000 MG tablet, TAKE 1 TAB BY MOUTH TWICE A DAY ( AM AND BEDTIME ), Disp: 180 tablet, Rfl: 3    LANTUS SOLOSTAR 100 UNIT/ML injection pen, INJECT 45 UNITS IN THE MORNING ,AND 35 UNITS NIGHTLY, Disp: 30 mL, Rfl: 5    topiramate (TOPAMAX) 100 MG tablet, Take 1 tablet by mouth nightly, Disp: 90 tablet, Rfl: 1    ranitidine (ZANTAC) 150 MG tablet, TAKE 1 TAB BY MOUTH TWICE A DAY ( AM AND BEDTIME ), Disp: 180 tablet, Rfl: 0    citalopram (CELEXA) 40 MG tablet, TAKE 1/2  TAB BY MOUTH TWICE  A DAY, Disp: 90 tablet, Rfl: 3    pantoprazole (PROTONIX) 40 MG tablet, Take 1 tablet by mouth 2 times daily, Disp: 60 tablet, Rfl: 3    losartan (COZAAR) 25 MG tablet, TAKE 1 TAB BY MOUTH ONCE A DAY, Disp: 90 tablet, Rfl: 5    clopidogrel (PLAVIX) 75 MG tablet, TAKE 1 TAB BY MOUTH ONCE A DAY, Disp: 90 tablet, Rfl: 3    OXYGEN, Inhale 3 L into the lungs , Disp: , Rfl:     ULTICARE MINI PEN NEEDLES 31G X 6 MM MISC, USE AS DIRECTED, Disp: 100 each, Rfl: 5    insulin aspart (NOVOLOG FLEXPEN) 100 UNIT/ML injection pen, If <139 - No Insulin; 140-199-2 Units;200-249-4 Units;250-299-6 Units;300-349-8 Units;350-400=10 Units; Above 400-12 Units, Disp: 5 pen, Rfl: 3    ibuprofen (IBU) 800 MG tablet, Take 1 tablet by mouth every 8 hours as needed for Pain, Disp: 45 tablet, Rfl: 0    TRUE METRIX BLOOD GLUCOSE TEST strip, THREE TIMES A DAY, Disp: 1 each, Rfl: Smoking status: Former Smoker     Packs/day: 3.00     Years: 41.00     Pack years: 123.00     Types: Cigarettes     Last attempt to quit: 2000     Years since quittin.6    Smokeless tobacco: Never Used    Tobacco comment: quit in    Substance and Sexual Activity    Alcohol use: No     Alcohol/week: 0.0 standard drinks    Drug use: No    Sexual activity: Not Currently   Lifestyle    Physical activity:     Days per week: Not on file     Minutes per session: Not on file    Stress: Not on file   Relationships    Social connections:     Talks on phone: Not on file     Gets together: Not on file     Attends Scientologist service: Not on file     Active member of club or organization: Not on file     Attends meetings of clubs or organizations: Not on file     Relationship status: Not on file    Intimate partner violence:     Fear of current or ex partner: Not on file     Emotionally abused: Not on file     Physically abused: Not on file     Forced sexual activity: Not on file   Other Topics Concern    Not on file   Social History Narrative    Not on file       Review of Systems:  Review of Systems   Constitution: Negative. HENT: Negative. Eyes:        Glasses   Cardiovascular: Negative. Respiratory:        Oxygen at night    Endocrine:        Blood sugars under control 124 yesterday   Hematologic/Lymphatic: Bruises/bleeds easily. Skin: Negative. Musculoskeletal: Positive for back pain and myalgias. Gastrointestinal: Positive for nausea. Genitourinary: Positive for nocturia. Neurological: Positive for headaches, numbness and weakness. Physical Exam:  BP (!) 129/58   Pulse 71   Temp 98.2 °F (36.8 °C) (Oral)   Resp 16   Ht 5' 2\" (1.575 m)   Wt 253 lb (114.8 kg)   LMP  (LMP Unknown)   SpO2 95%   BMI 46.27 kg/m²     Physical Exam   Constitutional: She appears well-developed. overweight   Neck: Normal range of motion. Neck supple.    Pulmonary/Chest: Effort normal. Treatment Plan:  DISCUSSION: Treatment options discussed withpatient and all questions answered to patient's satisfaction. Possible side effects, risk of tolerance and or dependence and alternative treatments discussed    Obtaining appropriate analgesic effect of treatment   No signs of potential drug abuse or diversion identified    [x] Ill effects of being on chronic pain medications such as sleep disturbances, respiratory depression, hormonal changes, withdrawal symptoms, chronic opioid dependence and tolerance as well as risk of taking opioids with Benzodiazepines and taking opioids along with alcohol,  werediscussed with patient. I had asked the patient to minimize medication use and utilize pain medications only for uncontrolled rest pain or pain with exertional activities. I advised patient not to self-escalate painmedications without consulting with us. At each of patient's future visits we will try to taper pain medications, while adjusting the adjunct medications, and re-evaluating for Physical Therapy to improve spinal andjoint strength. We will continue to have discussions to decrease pain medications as tolerated. Counseled patient on effects their pain medication and /or their medical condition mayhave on their  ability to drive or operate machinery. Instructed not to drive or operate machinery if drowsy     I also discussed with the patient regarding the dangers of combining narcotic pain medication with tranquilizers, alcohol or illegal drugs or taking the medication any way other than prescribed. The dangers were discussed  including respiratory depression and death. Patient was told to tell  all  physicians regarding the medications he is getting from pain clinic. Patient is warned not to take any unprescribed medications over-the-countermedications that can depress breathing .  Patient is advised to talk to the pharmacist or physicians if planning to take any over-the-counter

## 2019-08-15 NOTE — DISCHARGE INSTR - COC
Continuity of Care Form    Patient Name: Chante Fitzgerald   :  1950  MRN:  [id number]    Admit date:  8/15/2019  Discharge date:  ***    Code Status Order: Prior   Advance Directives:     Admitting Physician:  No admitting provider for patient encounter. PCP: Cristina Vanessa MD    Discharging Nurse: [facility] Unit/Room#: No information available for this encounter. Discharging Unit Phone Number: ***    Emergency Contact:   Extended Emergency Contact Information  Primary Emergency Contact: IsaelAden  Address: [address] Phone: [phone]  Mobile Phone: [phone]  Relation: Child    Past Surgical History:  Past Surgical History:   Procedure Laterality Date    ABLATION OF DYSRHYTHMIC FOCUS  2000    CARPAL TUNNEL RELEASE Right     CATARACT REMOVAL WITH IMPLANT Bilateral     CHOLECYSTECTOMY      COLONOSCOPY      COLONOSCOPY  04/10/2017    tubular adenomo polyp , mod. sigmoid diverticulosis, min. int. hemorrhoids    CYSTOSCOPY  2013    DILATION AND CURETTAGE  1979    EYE SURGERY      laser    INCONTINENCE SURGERY      Percutaneous nerve stimulation Dr Jane Boston 2013     INSERTABLE CARDIAC MONITOR  2015    [employer] REVEAL LINQ MODEL #[id number] MRI CONDTIONAL OK 3T.  IMMEDIATELY POST IMPLANT    OTHER SURGICAL HISTORY  09/10/15    removal of interstim    NV COLSC FLX W/REMOVAL LESION BY HOT BX FORCEPS N/A 4/10/2017    COLONOSCOPY POLYPECTOMY HOT BIOPSY performed by Daryn Cardenas MD at [address] [place] [address]      TYMPANOSTOMY TUBE PLACEMENT  2017    UPPER GASTROINTESTINAL ENDOSCOPY  2016    Dr Carolyne Longoria       Immunization History:   Immunization History   Administered Date(s) Administered    Influenza 10/18/2012, 10/07/2013    Influenza Virus Vaccine 10/02/2014, 10/20/2015    Influenza Whole 2010    Influenza, High Dose (Fluzone 65 yrs and older) 11/10/2016,

## 2019-08-16 DIAGNOSIS — K21.00 GASTROESOPHAGEAL REFLUX DISEASE WITH ESOPHAGITIS: ICD-10-CM

## 2019-08-16 RX ORDER — RANITIDINE 150 MG/1
TABLET ORAL
Qty: 180 TABLET | Refills: 0 | Status: SHIPPED | OUTPATIENT
Start: 2019-08-16 | End: 2020-01-23 | Stop reason: SDUPTHER

## 2019-09-12 ENCOUNTER — HOSPITAL ENCOUNTER (OUTPATIENT)
Age: 69
Discharge: HOME | End: 2019-09-12
Payer: COMMERCIAL

## 2019-09-12 DIAGNOSIS — Z01.812 PRE-PROCEDURE LAB EXAM: ICD-10-CM

## 2019-09-12 LAB
COLLAGEN ADENOSINE-5'-DIPHOSPHATE (ADP) TIME: 74 SEC (ref 67–112)
COLLAGEN EPINEPHRINE TIME: 115 SEC (ref 85–172)
PLATELET FUNCTION INTERP: NORMAL

## 2019-09-12 PROCEDURE — 85576 BLOOD PLATELET AGGREGATION: CPT

## 2019-09-12 PROCEDURE — 36415 COLL VENOUS BLD VENIPUNCTURE: CPT

## 2019-09-13 ENCOUNTER — HOSPITAL ENCOUNTER (OUTPATIENT)
Dept: PAIN MANAGEMENT | Age: 69
Discharge: HOME | End: 2019-09-13
Payer: COMMERCIAL

## 2019-09-13 ENCOUNTER — HOSPITAL ENCOUNTER (OUTPATIENT)
Dept: GENERAL RADIOLOGY | Age: 69
Discharge: HOME | End: 2019-09-15
Payer: COMMERCIAL

## 2019-09-13 VITALS
HEIGHT: 62 IN | SYSTOLIC BLOOD PRESSURE: 131 MMHG | WEIGHT: 253 LBS | OXYGEN SATURATION: 93 % | RESPIRATION RATE: 14 BRPM | TEMPERATURE: 97.8 F | DIASTOLIC BLOOD PRESSURE: 74 MMHG | HEART RATE: 74 BPM | BODY MASS INDEX: 46.56 KG/M2

## 2019-09-13 DIAGNOSIS — M54.16 LUMBAR RADICULOPATHY, CHRONIC: Primary | ICD-10-CM

## 2019-09-13 DIAGNOSIS — M46.1 SACROILIITIS, NOT ELSEWHERE CLASSIFIED (HCC): Chronic | ICD-10-CM

## 2019-09-13 DIAGNOSIS — M50.30 DDD (DEGENERATIVE DISC DISEASE), CERVICAL: ICD-10-CM

## 2019-09-13 DIAGNOSIS — M47.817 LUMBOSACRAL SPONDYLOSIS WITHOUT MYELOPATHY: ICD-10-CM

## 2019-09-13 DIAGNOSIS — M54.16 LUMBAR RADICULOPATHY, CHRONIC: ICD-10-CM

## 2019-09-13 PROCEDURE — 6360000002 HC RX W HCPCS: Performed by: PAIN MEDICINE

## 2019-09-13 PROCEDURE — 62323 NJX INTERLAMINAR LMBR/SAC: CPT | Performed by: PAIN MEDICINE

## 2019-09-13 PROCEDURE — 3209999900 FLUORO FOR SURGICAL PROCEDURES

## 2019-09-13 PROCEDURE — 62327 NJX INTERLAMINAR LMBR/SAC: CPT

## 2019-09-13 PROCEDURE — 6360000004 HC RX CONTRAST MEDICATION: Performed by: PAIN MEDICINE

## 2019-09-13 RX ORDER — TRIAMCINOLONE ACETONIDE 40 MG/ML
INJECTION, SUSPENSION INTRA-ARTICULAR; INTRAMUSCULAR
Status: COMPLETED | OUTPATIENT
Start: 2019-09-13 | End: 2019-09-13

## 2019-09-13 RX ORDER — OXYCODONE HYDROCHLORIDE AND ACETAMINOPHEN 5; 325 MG/1; MG/1
1 TABLET ORAL SEE ADMIN INSTRUCTIONS
Qty: 100 TABLET | Refills: 0 | Status: SHIPPED | OUTPATIENT
Start: 2019-09-16 | End: 2019-10-10 | Stop reason: SDUPTHER

## 2019-09-13 RX ORDER — NALOXONE HYDROCHLORIDE 4 MG/.1ML
1 SPRAY NASAL PRN
Qty: 1 EACH | Refills: 5 | Status: SHIPPED | OUTPATIENT
Start: 2019-09-13 | End: 2020-06-02

## 2019-09-13 RX ADMIN — TRIAMCINOLONE ACETONIDE 80 MG: 40 INJECTION, SUSPENSION INTRA-ARTICULAR; INTRAMUSCULAR at 09:58

## 2019-09-13 RX ADMIN — IOHEXOL 4 ML: 180 INJECTION INTRAVENOUS at 09:57

## 2019-09-13 ASSESSMENT — ACTIVITIES OF DAILY LIVING (ADL): EFFECT OF PAIN ON DAILY ACTIVITIES: PAINFUL WHEN WALKING

## 2019-09-13 ASSESSMENT — PAIN DESCRIPTION - ONSET: ONSET: ON-GOING

## 2019-09-13 ASSESSMENT — PAIN SCALES - GENERAL
PAINLEVEL_OUTOF10: 8
PAINLEVEL_OUTOF10: 0

## 2019-09-13 ASSESSMENT — PAIN DESCRIPTION - ORIENTATION: ORIENTATION: LEFT

## 2019-09-13 ASSESSMENT — PAIN - FUNCTIONAL ASSESSMENT: PAIN_FUNCTIONAL_ASSESSMENT: PREVENTS OR INTERFERES SOME ACTIVE ACTIVITIES AND ADLS

## 2019-09-13 ASSESSMENT — PAIN DESCRIPTION - DESCRIPTORS: DESCRIPTORS: ACHING;CONSTANT;SHARP

## 2019-09-13 ASSESSMENT — PAIN DESCRIPTION - FREQUENCY: FREQUENCY: CONTINUOUS

## 2019-09-13 ASSESSMENT — PAIN DESCRIPTION - PAIN TYPE: TYPE: CHRONIC PAIN

## 2019-09-13 ASSESSMENT — PAIN DESCRIPTION - PROGRESSION: CLINICAL_PROGRESSION: NOT CHANGED

## 2019-09-13 ASSESSMENT — PAIN DESCRIPTION - LOCATION: LOCATION: ABDOMEN;BACK

## 2019-09-13 NOTE — PROCEDURES
dysrhythmic focus (2000); Upper gastrointestinal endoscopy (03/2016); eye surgery; Tubal ligation; other surgical history (09/10/15); Insertable Cardiac Monitor (12/04/2015); Tympanoplasty; Colonoscopy; Colonoscopy (04/10/2017); pr colsc flx w/removal lesion by hot bx forceps (N/A, 4/10/2017); and Tympanostomy tube placement (08/21/2017). Pre-Sedation Documentation and Exam:   Vital signs have been reviewed (see flow sheet for vitals). Mallampati Airway Assessment:  normal    ASA Classification:  Class 3 - A patient with severe systemic disease that limits activity but is not incapacitating    Sedation/ Anesthesia Plan:   intravenous sedation   as needed. Medications Planned:   midazolam (Versed) / Fentanyl  Intravenously  as needed. Patient is an appropriate candidate for plan of sedation: yes  Patient's History and Physical examination was reviewed and there is no change. Electronically signed by Tamia Weston MD on 9/13/2019 at 10:02 AM        Preoperative Diagnosis:    1. Lumbar radiculopathy, chronic    2. DDD (degenerative disc disease), cervical    3. Lumbosacral spondylosis without myelopathy        Postoperative Diagnosis:    1. Lumbar radiculopathy, chronic    2. DDD (degenerative disc disease), cervical    3. Lumbosacral spondylosis without myelopathy        Procedure Performed:  Lumbar epidural steroid injection under fluoroscopy guidance without IV sedation. Indication for the Procedure: The patient failed conservative management  for pain in the low back radiating to lower extremities. The patient has undergone lumbar epidural steroid injections and the last procedure gave 50% relief. As the patient is not responding to conservative management and it is interfering with activities of daily living we decided to proceed with lumbar epidural steroid injection.  The procedure and risks were discussed with the patient and an informed consent was obtained  Current Pain Assessment  Pain Assessment  Pain Assessment: 0-10  Pain Level: 8  Patient's Stated Pain Goal: (to decrease pain with increased activity)  Pain Type: Chronic pain  Pain Location: Abdomen, Back  Pain Orientation: Left  Pain Radiating Towards: pain down bilat legs  Pain Descriptors: Aching, Constant, Sharp  Pain Frequency: Continuous  Pain Onset: On-going  Clinical Progression: Not changed  Effect of Pain on Daily Activities: painful when walking  Functional Pain Assessment: Prevents or interferes some active activities and ADLs  Non-Pharmaceutical Pain Intervention(s): Cold applied, Repositioned, Rest     Procedure:      Patient's vital signs including BP, EKG and SaO2 were monitored by the RN and they remained stable during the procedure. A meaningful communication was kept up with the patient throughout  the procedure. The patient is placed in prone position. Skin over the back was prepped and draped in sterile manner. Then using fluoroscopy the L2, L3 interspace was observed and the skin and deep tissues in the left paramedian area were infiltrated with 4 ml of 1% lidocaine. The #20-gauge, 3-1/2 inch Tuohy needle was inserted through the skin wheal and the epidural space was identified using loss of resistance technique with normal saline. This was confirmed with AP and lateral views using fluoroscopy after injecting about 1 ml of Omnipaque-180 and it appeared to the contrast was spreading intrathecally. Patient also had paresthesias in her right lower extremities hence the needle was removed and repositioned it L 3 4 interspace and again the epidural space was identified using loss-of-resistance technique this was confirmed by injecting 3 cc of Omnipaque 180 through the needle and observing the spread of the contrast in the epidural space. Then after negative aspiration a total of 80 mg of triamcinolone with 8 ml of normal saline was injected into the epidural space.  The needle is removed and a Band-Aid

## 2019-09-14 ENCOUNTER — HOSPITAL ENCOUNTER (OUTPATIENT)
Age: 69
Setting detail: OBSERVATION
Discharge: HOME | End: 2019-09-15
Attending: EMERGENCY MEDICINE | Admitting: EMERGENCY MEDICINE
Payer: COMMERCIAL

## 2019-09-14 DIAGNOSIS — R73.9 HYPERGLYCEMIA: Primary | ICD-10-CM

## 2019-09-14 LAB
-: ABNORMAL
ABSOLUTE EOS #: <0.03 K/UL (ref 0–0.44)
ABSOLUTE IMMATURE GRANULOCYTE: 0.12 K/UL (ref 0–0.3)
ABSOLUTE LYMPH #: 0.92 K/UL (ref 1.1–3.7)
ABSOLUTE MONO #: 0.66 K/UL (ref 0.1–1.2)
ALBUMIN SERPL-MCNC: 3.8 G/DL (ref 3.5–5.2)
ALBUMIN/GLOBULIN RATIO: 1.2 (ref 1–2.5)
ALLEN TEST: ABNORMAL
ALP BLD-CCNC: 64 U/L (ref 35–104)
ALT SERPL-CCNC: 16 U/L (ref 5–33)
AMORPHOUS: ABNORMAL
ANION GAP SERPL CALCULATED.3IONS-SCNC: 12 MMOL/L (ref 9–17)
ANION GAP: 10 MMOL/L (ref 7–16)
AST SERPL-CCNC: 9 U/L
BACTERIA: ABNORMAL
BASOPHILS # BLD: 0 % (ref 0–2)
BASOPHILS ABSOLUTE: <0.03 K/UL (ref 0–0.2)
BETA-HYDROXYBUTYRATE: 0.11 MMOL/L (ref 0.02–0.27)
BILIRUB SERPL-MCNC: <0.1 MG/DL (ref 0.3–1.2)
BILIRUBIN URINE: NEGATIVE
BUN BLDV-MCNC: 24 MG/DL (ref 8–23)
BUN/CREAT BLD: ABNORMAL (ref 9–20)
CALCIUM SERPL-MCNC: 8.8 MG/DL (ref 8.6–10.4)
CASTS UA: ABNORMAL /LPF (ref 0–8)
CHLORIDE BLD-SCNC: 102 MMOL/L (ref 98–107)
CHP ED QC CHECK: YES
CO2: 23 MMOL/L (ref 20–31)
COLOR: YELLOW
CREAT SERPL-MCNC: 1.07 MG/DL (ref 0.5–0.9)
CRYSTALS, UA: ABNORMAL /HPF
DIFFERENTIAL TYPE: ABNORMAL
EOSINOPHILS RELATIVE PERCENT: 0 % (ref 1–4)
EPITHELIAL CELLS UA: ABNORMAL /HPF (ref 0–5)
FIO2: ABNORMAL
GFR AFRICAN AMERICAN: >60 ML/MIN
GFR NON-AFRICAN AMERICAN: 49 ML/MIN
GFR NON-AFRICAN AMERICAN: 51 ML/MIN
GFR SERPL CREATININE-BSD FRML MDRD: 59 ML/MIN
GFR SERPL CREATININE-BSD FRML MDRD: ABNORMAL ML/MIN/{1.73_M2}
GLUCOSE BLD-MCNC: 299 MG/DL
GLUCOSE BLD-MCNC: 299 MG/DL (ref 65–105)
GLUCOSE BLD-MCNC: 333 MG/DL (ref 70–99)
GLUCOSE BLD-MCNC: 419 MG/DL (ref 65–105)
GLUCOSE BLD-MCNC: 495 MG/DL (ref 74–100)
GLUCOSE URINE: ABNORMAL
HCO3 VENOUS: 22.4 MMOL/L (ref 22–29)
HCT VFR BLD CALC: 41.9 % (ref 36.3–47.1)
HEMOGLOBIN: 12.8 G/DL (ref 11.9–15.1)
IMMATURE GRANULOCYTES: 1 %
KETONES, URINE: NEGATIVE
LEUKOCYTE ESTERASE, URINE: NEGATIVE
LIPASE: 59 U/L (ref 13–60)
LYMPHOCYTES # BLD: 10 % (ref 24–43)
MCH RBC QN AUTO: 29.3 PG (ref 25.2–33.5)
MCHC RBC AUTO-ENTMCNC: 30.5 G/DL (ref 28.4–34.8)
MCV RBC AUTO: 95.9 FL (ref 82.6–102.9)
MODE: ABNORMAL
MONOCYTES # BLD: 7 % (ref 3–12)
MUCUS: ABNORMAL
NEGATIVE BASE EXCESS, VEN: 2 (ref 0–2)
NITRITE, URINE: NEGATIVE
NRBC AUTOMATED: 0 PER 100 WBC
O2 DEVICE/FLOW/%: ABNORMAL
O2 SAT, VEN: 100 % (ref 60–85)
OTHER OBSERVATIONS UA: ABNORMAL
PATIENT TEMP: ABNORMAL
PCO2, VEN: 36.8 MM HG (ref 41–51)
PDW BLD-RTO: 12.8 % (ref 11.8–14.4)
PH UA: 5 (ref 5–8)
PH VENOUS: 7.39 (ref 7.32–7.43)
PLATELET # BLD: 210 K/UL (ref 138–453)
PLATELET ESTIMATE: ABNORMAL
PMV BLD AUTO: 12.8 FL (ref 8.1–13.5)
PO2, VEN: 169.9 MM HG (ref 30–50)
POC CHLORIDE: 102 MMOL/L (ref 98–107)
POC CREATININE: 1.11 MG/DL (ref 0.51–1.19)
POC HEMATOCRIT: 45 % (ref 36–46)
POC HEMOGLOBIN: 15.4 G/DL (ref 12–16)
POC IONIZED CALCIUM: 1.1 MMOL/L (ref 1.15–1.33)
POC LACTIC ACID: 5.09 MMOL/L (ref 0.56–1.39)
POC PCO2 TEMP: ABNORMAL MM HG
POC PH TEMP: ABNORMAL
POC PO2 TEMP: ABNORMAL MM HG
POC POTASSIUM: 5.1 MMOL/L (ref 3.5–4.5)
POC SODIUM: 134 MMOL/L (ref 138–146)
POSITIVE BASE EXCESS, VEN: ABNORMAL (ref 0–3)
POTASSIUM SERPL-SCNC: 4.3 MMOL/L (ref 3.7–5.3)
PROTEIN UA: NEGATIVE
RBC # BLD: 4.37 M/UL (ref 3.95–5.11)
RBC # BLD: ABNORMAL 10*6/UL
RBC UA: ABNORMAL /HPF (ref 0–4)
RENAL EPITHELIAL, UA: ABNORMAL /HPF
SAMPLE SITE: ABNORMAL
SEG NEUTROPHILS: 82 % (ref 36–65)
SEGMENTED NEUTROPHILS ABSOLUTE COUNT: 7.94 K/UL (ref 1.5–8.1)
SODIUM BLD-SCNC: 137 MMOL/L (ref 135–144)
SPECIFIC GRAVITY UA: 1.02 (ref 1–1.03)
TOTAL CO2, VENOUS: 24 MMOL/L (ref 23–30)
TOTAL PROTEIN: 6.9 G/DL (ref 6.4–8.3)
TRICHOMONAS: ABNORMAL
TROPONIN INTERP: NORMAL
TROPONIN T: NORMAL NG/ML
TROPONIN, HIGH SENSITIVITY: 8 NG/L (ref 0–14)
TURBIDITY: CLEAR
URINE HGB: NEGATIVE
UROBILINOGEN, URINE: NORMAL
WBC # BLD: 9.7 K/UL (ref 3.5–11.3)
WBC # BLD: ABNORMAL 10*3/UL
WBC UA: ABNORMAL /HPF (ref 0–5)
YEAST: ABNORMAL

## 2019-09-14 PROCEDURE — 80053 COMPREHEN METABOLIC PANEL: CPT

## 2019-09-14 PROCEDURE — 82330 ASSAY OF CALCIUM: CPT

## 2019-09-14 PROCEDURE — 96372 THER/PROPH/DIAG INJ SC/IM: CPT

## 2019-09-14 PROCEDURE — 82803 BLOOD GASES ANY COMBINATION: CPT

## 2019-09-14 PROCEDURE — 94640 AIRWAY INHALATION TREATMENT: CPT

## 2019-09-14 PROCEDURE — 82565 ASSAY OF CREATININE: CPT

## 2019-09-14 PROCEDURE — 6370000000 HC RX 637 (ALT 250 FOR IP): Performed by: STUDENT IN AN ORGANIZED HEALTH CARE EDUCATION/TRAINING PROGRAM

## 2019-09-14 PROCEDURE — 84132 ASSAY OF SERUM POTASSIUM: CPT

## 2019-09-14 PROCEDURE — 93005 ELECTROCARDIOGRAM TRACING: CPT | Performed by: STUDENT IN AN ORGANIZED HEALTH CARE EDUCATION/TRAINING PROGRAM

## 2019-09-14 PROCEDURE — 84295 ASSAY OF SERUM SODIUM: CPT

## 2019-09-14 PROCEDURE — 82435 ASSAY OF BLOOD CHLORIDE: CPT

## 2019-09-14 PROCEDURE — 83690 ASSAY OF LIPASE: CPT

## 2019-09-14 PROCEDURE — 99285 EMERGENCY DEPT VISIT HI MDM: CPT

## 2019-09-14 PROCEDURE — 82010 KETONE BODYS QUAN: CPT

## 2019-09-14 PROCEDURE — 2580000003 HC RX 258: Performed by: STUDENT IN AN ORGANIZED HEALTH CARE EDUCATION/TRAINING PROGRAM

## 2019-09-14 PROCEDURE — 96374 THER/PROPH/DIAG INJ IV PUSH: CPT

## 2019-09-14 PROCEDURE — 81001 URINALYSIS AUTO W/SCOPE: CPT

## 2019-09-14 PROCEDURE — 6360000002 HC RX W HCPCS: Performed by: STUDENT IN AN ORGANIZED HEALTH CARE EDUCATION/TRAINING PROGRAM

## 2019-09-14 PROCEDURE — 84484 ASSAY OF TROPONIN QUANT: CPT

## 2019-09-14 PROCEDURE — 85014 HEMATOCRIT: CPT

## 2019-09-14 PROCEDURE — 85025 COMPLETE CBC W/AUTO DIFF WBC: CPT

## 2019-09-14 PROCEDURE — G0378 HOSPITAL OBSERVATION PER HR: HCPCS

## 2019-09-14 PROCEDURE — 82947 ASSAY GLUCOSE BLOOD QUANT: CPT

## 2019-09-14 PROCEDURE — 96375 TX/PRO/DX INJ NEW DRUG ADDON: CPT

## 2019-09-14 PROCEDURE — 83605 ASSAY OF LACTIC ACID: CPT

## 2019-09-14 RX ORDER — ALBUTEROL SULFATE 90 UG/1
2 AEROSOL, METERED RESPIRATORY (INHALATION)
Status: DISCONTINUED | OUTPATIENT
Start: 2019-09-14 | End: 2019-09-14

## 2019-09-14 RX ORDER — KETOROLAC TROMETHAMINE 15 MG/ML
15 INJECTION, SOLUTION INTRAMUSCULAR; INTRAVENOUS ONCE
Status: COMPLETED | OUTPATIENT
Start: 2019-09-14 | End: 2019-09-14

## 2019-09-14 RX ORDER — ONDANSETRON 2 MG/ML
4 INJECTION INTRAMUSCULAR; INTRAVENOUS ONCE
Status: COMPLETED | OUTPATIENT
Start: 2019-09-14 | End: 2019-09-14

## 2019-09-14 RX ORDER — METOCLOPRAMIDE HYDROCHLORIDE 5 MG/ML
10 INJECTION INTRAMUSCULAR; INTRAVENOUS ONCE
Status: COMPLETED | OUTPATIENT
Start: 2019-09-14 | End: 2019-09-14

## 2019-09-14 RX ORDER — DIPHENHYDRAMINE HYDROCHLORIDE 50 MG/ML
25 INJECTION INTRAMUSCULAR; INTRAVENOUS ONCE
Status: COMPLETED | OUTPATIENT
Start: 2019-09-14 | End: 2019-09-14

## 2019-09-14 RX ORDER — ALBUTEROL SULFATE 2.5 MG/3ML
5 SOLUTION RESPIRATORY (INHALATION)
Status: DISCONTINUED | OUTPATIENT
Start: 2019-09-14 | End: 2019-09-14

## 2019-09-14 RX ORDER — 0.9 % SODIUM CHLORIDE 0.9 %
1000 INTRAVENOUS SOLUTION INTRAVENOUS ONCE
Status: COMPLETED | OUTPATIENT
Start: 2019-09-14 | End: 2019-09-14

## 2019-09-14 RX ADMIN — ALBUTEROL SULFATE 5 MG: 5 SOLUTION RESPIRATORY (INHALATION) at 21:31

## 2019-09-14 RX ADMIN — IPRATROPIUM BROMIDE 0.5 MG: 0.5 SOLUTION RESPIRATORY (INHALATION) at 21:31

## 2019-09-14 RX ADMIN — METOCLOPRAMIDE 10 MG: 5 INJECTION, SOLUTION INTRAMUSCULAR; INTRAVENOUS at 20:37

## 2019-09-14 RX ADMIN — SODIUM CHLORIDE 1000 ML: 9 INJECTION, SOLUTION INTRAVENOUS at 19:41

## 2019-09-14 RX ADMIN — ONDANSETRON 4 MG: 2 INJECTION INTRAMUSCULAR; INTRAVENOUS at 20:19

## 2019-09-14 RX ADMIN — INSULIN LISPRO 10 UNITS: 100 INJECTION, SOLUTION INTRAVENOUS; SUBCUTANEOUS at 20:44

## 2019-09-14 RX ADMIN — KETOROLAC TROMETHAMINE 15 MG: 15 INJECTION, SOLUTION INTRAMUSCULAR; INTRAVENOUS at 20:38

## 2019-09-14 RX ADMIN — DIPHENHYDRAMINE HYDROCHLORIDE 25 MG: 50 INJECTION, SOLUTION INTRAMUSCULAR; INTRAVENOUS at 20:38

## 2019-09-14 ASSESSMENT — ENCOUNTER SYMPTOMS
CONSTIPATION: 0
NAUSEA: 0
WHEEZING: 0
DIARRHEA: 0
ABDOMINAL PAIN: 0
TROUBLE SWALLOWING: 0
VOICE CHANGE: 0
BACK PAIN: 1
SHORTNESS OF BREATH: 0
VOMITING: 0
COUGH: 0

## 2019-09-14 ASSESSMENT — PAIN SCALES - GENERAL
PAINLEVEL_OUTOF10: 9
PAINLEVEL_OUTOF10: 9

## 2019-09-14 ASSESSMENT — PAIN DESCRIPTION - LOCATION: LOCATION: BACK;HEAD

## 2019-09-14 ASSESSMENT — PAIN DESCRIPTION - PAIN TYPE: TYPE: ACUTE PAIN

## 2019-09-14 NOTE — ED TRIAGE NOTES
Pt to ED with c/o hyperglycemia since having steroid injections into her back at the pain clinic on Friday. Pt states her blood sugars run mid-100s but the past couple of days have been running in 300-400s. Pt started hallucinating today of seeing her dead son and that is when she called EMS. EMS referred her to come to the ED but she wanted to come by private auto. Pt states blurry vision and headache. Pt BS on arrival is 419. Pt has hx of hallucinations associated with high blood glucose. Pt on full cardiac monitor. Will continue to monitor.

## 2019-09-14 NOTE — ED PROVIDER NOTES
AND BEDTIME ) 8/8/19   Ortiz Urena MD   metFORMIN (GLUCOPHAGE) 1000 MG tablet TAKE 1 TAB BY MOUTH TWICE A DAY ( AM AND BEDTIME ) 8/8/19   Ortiz Urena MD   LANTUS SOLOSTAR 100 UNIT/ML injection pen INJECT 45 UNITS IN THE MORNING ,AND 35 UNITS NIGHTLY 8/8/19   Ortiz Urena MD   ULTICARE MINI PEN NEEDLES 31G X 6 MM MISC USE AS DIRECTED 8/8/19   Ortiz Urena MD   topiramate (TOPAMAX) 100 MG tablet Take 1 tablet by mouth nightly 7/16/19   Cece Render, APRN - CNP   insulin aspart (NOVOLOG FLEXPEN) 100 UNIT/ML injection pen If <139 - No Insulin; 140-199-2 Units;200-249-4 Units;250-299-6 Units;300-349-8 Units;350-400=10 Units; Above 400-12 Units 5/13/19   Ortiz Urena MD   ibuprofen (IBU) 800 MG tablet Take 1 tablet by mouth every 8 hours as needed for Pain 4/14/19   Rosalba Nicholson DO   citalopram (CELEXA) 40 MG tablet TAKE 1/2  TAB BY MOUTH TWICE  A DAY 4/2/19   Ortiz Urena MD   pantoprazole (PROTONIX) 40 MG tablet Take 1 tablet by mouth 2 times daily 3/15/19   Minnie Oppenheim, DO   TRUE METRIX BLOOD GLUCOSE TEST strip THREE TIMES A DAY 12/13/18   Ortiz Urena MD   Alcohol Swabs (B-D SINGLE USE SWABS REGULAR) PADS THREE TIMES A DAY 12/12/18   Ortiz Urena MD   nystatin (MYCOSTATIN) 116562 UNIT/GM powder Apply 3 times daily.  12/10/18   Ortiz Urena MD   furosemide (LASIX) 20 MG tablet Take 1 tablet by mouth daily as needed (weight gain 3 lbs overnight) 11/16/18   Alysa Canas MD   losartan (COZAAR) 25 MG tablet TAKE 1 TAB BY MOUTH ONCE A DAY 11/6/18   Ortiz Urena MD   Lift Chair MISC by Does not apply route Use daily at home to help with ADL's 11/6/18   Ortiz Urena MD   clopidogrel (PLAVIX) 75 MG tablet TAKE 1 TAB BY MOUTH ONCE A DAY 10/22/18   Ortiz Urena MD   nitroGLYCERIN (NITROSTAT) 0.4 MG SL tablet 1 under the tongue as needed for angina, may repeat q5mins for up three doses 8/28/18   Historical Provider, MD   Blood Glucose Monitoring Suppl HARMEET Use daily to check BS 3/27/18 solution 0.5 mg       DDX: Hyperglycemia, DKA, UTI, migraine, dehydration    DIAGNOSTIC RESULTS / DEPARTMENT COURSE / MDM     LABS:  Results for orders placed or performed during the hospital encounter of 09/14/19   CBC Auto Differential   Result Value Ref Range    WBC 9.7 3.5 - 11.3 k/uL    RBC 4.37 3.95 - 5.11 m/uL    Hemoglobin 12.8 11.9 - 15.1 g/dL    Hematocrit 41.9 36.3 - 47.1 %    MCV 95.9 82.6 - 102.9 fL    MCH 29.3 25.2 - 33.5 pg    MCHC 30.5 28.4 - 34.8 g/dL    RDW 12.8 11.8 - 14.4 %    Platelets 304 441 - 925 k/uL    MPV 12.8 8.1 - 13.5 fL    NRBC Automated 0.0 0.0 per 100 WBC    Differential Type NOT REPORTED     Seg Neutrophils 82 (H) 36 - 65 %    Lymphocytes 10 (L) 24 - 43 %    Monocytes 7 3 - 12 %    Eosinophils % 0 (L) 1 - 4 %    Basophils 0 0 - 2 %    Immature Granulocytes 1 (H) 0 %    Segs Absolute 7.94 1.50 - 8.10 k/uL    Absolute Lymph # 0.92 (L) 1.10 - 3.70 k/uL    Absolute Mono # 0.66 0.10 - 1.20 k/uL    Absolute Eos # <0.03 0.00 - 0.44 k/uL    Basophils Absolute <0.03 0.00 - 0.20 k/uL    Absolute Immature Granulocyte 0.12 0.00 - 0.30 k/uL    WBC Morphology NOT REPORTED     RBC Morphology NOT REPORTED     Platelet Estimate NOT REPORTED    Urinalysis with Microscopic   Result Value Ref Range    Color, UA YELLOW YELLOW    Turbidity UA CLEAR CLEAR    Glucose, Ur 3+ (A) NEGATIVE    Bilirubin Urine NEGATIVE NEGATIVE    Ketones, Urine NEGATIVE NEGATIVE    Specific Gravity, UA 1.025 1.005 - 1.030    Urine Hgb NEGATIVE NEGATIVE    pH, UA 5.0 5.0 - 8.0    Protein, UA NEGATIVE NEGATIVE    Urobilinogen, Urine Normal Normal    Nitrite, Urine NEGATIVE NEGATIVE    Leukocyte Esterase, Urine NEGATIVE NEGATIVE    -          WBC, UA 2 TO 5 0 - 5 /HPF    RBC, UA None 0 - 4 /HPF    Casts UA  0 - 8 /LPF    Crystals UA NOT REPORTED None /HPF    Epithelial Cells UA 0 TO 2 0 - 5 /HPF    Renal Epithelial, Urine NOT REPORTED 0 /HPF    Bacteria, UA NOT REPORTED None    Mucus, UA NOT REPORTED None    Trichomonas, UA NOT Venous 24 23.0 - 30.0 mmol/L    Negative Base Excess, Onel 2 0.0 - 2.0    Positive Base Excess, Onel NOT REPORTED 0.0 - 3.0    O2 Sat, Onel 100 (H) 60.0 - 85.0 %    O2 Device/Flow/% NOT REPORTED     Dom Test NOT REPORTED     Sample Site NOT REPORTED     Mode NOT REPORTED     FIO2 NOT REPORTED     Pt Temp NOT REPORTED     POC pH Temp NOT REPORTED     POC pCO2 Temp NOT REPORTED mm Hg    POC pO2 Temp NOT REPORTED mm Hg   Creatinine W/GFR Point of Care   Result Value Ref Range    POC Creatinine 1.11 0.51 - 1.19 mg/dL    GFR Comment 59 (L) >60 mL/min    GFR Non- 49 (L) >60 mL/min    GFR Comment         Lactic Acid, POC   Result Value Ref Range    POC Lactic Acid 5.09 (H) 0.56 - 1.39 mmol/L   POCT Glucose   Result Value Ref Range    POC Glucose 495 (HH) 74 - 100 mg/dL   Anion Gap (Calc) POC   Result Value Ref Range    Anion Gap 10 7 - 16 mmol/L   POCT Glucose   Result Value Ref Range    Glucose 299 mg/dL    QC OK? yes    POC Glucose Fingerstick   Result Value Ref Range    POC Glucose 299 (H) 65 - 105 mg/dL       IMPRESSION: 76-year-old female presents with concern for her glucose running high as well as headache nausea vomiting. Patient additionally has headache similar to previous episodes of migraines. Will give Zofran IV fluids insulin Reglan Benadryl obtain CBC BMP EKG VBG Trop UA and ketones and reevaluate patient. RADIOLOGY:  No results found. EKG  EKG Interpretation    Interpreted by me    Rhythm: normal sinus   Rate: normal  Axis: normal  Ectopy: none  Conduction: normal  ST Segments: no acute change  T Waves:  Inversions and aVL, diffuse flattening  Q Waves: none    Clinical Impression: Nonspecific EKG    All EKG's are interpreted by the Emergency Department Physician who either signs or Co-signsthis chart in the absence of a cardiologist.    EMERGENCY DEPARTMENT COURSE:  ED Course as of Sep 14 2359   Sat Sep 14, 2019   2139 Glucose: 299 [BL]      ED Course User Index  [BL] Dylon Capps

## 2019-09-15 VITALS
RESPIRATION RATE: 16 BRPM | SYSTOLIC BLOOD PRESSURE: 140 MMHG | HEART RATE: 68 BPM | HEIGHT: 62 IN | DIASTOLIC BLOOD PRESSURE: 82 MMHG | BODY MASS INDEX: 47.29 KG/M2 | TEMPERATURE: 98.1 F | WEIGHT: 257 LBS | OXYGEN SATURATION: 92 %

## 2019-09-15 LAB
ABSOLUTE EOS #: <0.03 K/UL (ref 0–0.44)
ABSOLUTE IMMATURE GRANULOCYTE: 0.08 K/UL (ref 0–0.3)
ABSOLUTE LYMPH #: 0.85 K/UL (ref 1.1–3.7)
ABSOLUTE MONO #: 0.62 K/UL (ref 0.1–1.2)
ANION GAP SERPL CALCULATED.3IONS-SCNC: 13 MMOL/L (ref 9–17)
BASOPHILS # BLD: 0 % (ref 0–2)
BASOPHILS ABSOLUTE: <0.03 K/UL (ref 0–0.2)
BUN BLDV-MCNC: 27 MG/DL (ref 8–23)
BUN/CREAT BLD: ABNORMAL (ref 9–20)
CALCIUM SERPL-MCNC: 9.2 MG/DL (ref 8.6–10.4)
CHLORIDE BLD-SCNC: 102 MMOL/L (ref 98–107)
CO2: 21 MMOL/L (ref 20–31)
CREAT SERPL-MCNC: 1 MG/DL (ref 0.5–0.9)
DIFFERENTIAL TYPE: ABNORMAL
EOSINOPHILS RELATIVE PERCENT: 0 % (ref 1–4)
GFR AFRICAN AMERICAN: >60 ML/MIN
GFR NON-AFRICAN AMERICAN: 55 ML/MIN
GFR SERPL CREATININE-BSD FRML MDRD: ABNORMAL ML/MIN/{1.73_M2}
GFR SERPL CREATININE-BSD FRML MDRD: ABNORMAL ML/MIN/{1.73_M2}
GLUCOSE BLD-MCNC: 200 MG/DL (ref 65–105)
GLUCOSE BLD-MCNC: 204 MG/DL (ref 65–105)
GLUCOSE BLD-MCNC: 248 MG/DL (ref 65–105)
GLUCOSE BLD-MCNC: 251 MG/DL (ref 65–105)
GLUCOSE BLD-MCNC: 273 MG/DL (ref 70–99)
GLUCOSE BLD-MCNC: 386 MG/DL (ref 65–105)
HCT VFR BLD CALC: 37.9 % (ref 36.3–47.1)
HEMOGLOBIN: 11.6 G/DL (ref 11.9–15.1)
IMMATURE GRANULOCYTES: 1 %
LYMPHOCYTES # BLD: 11 % (ref 24–43)
MCH RBC QN AUTO: 30.6 PG (ref 25.2–33.5)
MCHC RBC AUTO-ENTMCNC: 30.6 G/DL (ref 28.4–34.8)
MCV RBC AUTO: 100 FL (ref 82.6–102.9)
MONOCYTES # BLD: 8 % (ref 3–12)
NRBC AUTOMATED: 0 PER 100 WBC
PDW BLD-RTO: 12.9 % (ref 11.8–14.4)
PLATELET # BLD: 178 K/UL (ref 138–453)
PLATELET ESTIMATE: ABNORMAL
PMV BLD AUTO: 12 FL (ref 8.1–13.5)
POTASSIUM SERPL-SCNC: 4.8 MMOL/L (ref 3.7–5.3)
RBC # BLD: 3.79 M/UL (ref 3.95–5.11)
RBC # BLD: ABNORMAL 10*6/UL
SEG NEUTROPHILS: 80 % (ref 36–65)
SEGMENTED NEUTROPHILS ABSOLUTE COUNT: 6.1 K/UL (ref 1.5–8.1)
SODIUM BLD-SCNC: 136 MMOL/L (ref 135–144)
WBC # BLD: 7.7 K/UL (ref 3.5–11.3)
WBC # BLD: ABNORMAL 10*3/UL

## 2019-09-15 PROCEDURE — 6370000000 HC RX 637 (ALT 250 FOR IP): Performed by: EMERGENCY MEDICINE

## 2019-09-15 PROCEDURE — 2580000003 HC RX 258: Performed by: STUDENT IN AN ORGANIZED HEALTH CARE EDUCATION/TRAINING PROGRAM

## 2019-09-15 PROCEDURE — 6370000000 HC RX 637 (ALT 250 FOR IP): Performed by: STUDENT IN AN ORGANIZED HEALTH CARE EDUCATION/TRAINING PROGRAM

## 2019-09-15 PROCEDURE — 94640 AIRWAY INHALATION TREATMENT: CPT

## 2019-09-15 PROCEDURE — G0378 HOSPITAL OBSERVATION PER HR: HCPCS

## 2019-09-15 PROCEDURE — 80048 BASIC METABOLIC PNL TOTAL CA: CPT

## 2019-09-15 PROCEDURE — 36415 COLL VENOUS BLD VENIPUNCTURE: CPT

## 2019-09-15 PROCEDURE — 82947 ASSAY GLUCOSE BLOOD QUANT: CPT

## 2019-09-15 PROCEDURE — 85025 COMPLETE CBC W/AUTO DIFF WBC: CPT

## 2019-09-15 PROCEDURE — 6370000000 HC RX 637 (ALT 250 FOR IP): Performed by: GENERAL PRACTICE

## 2019-09-15 RX ORDER — ALBUTEROL SULFATE 2.5 MG/3ML
2.5 SOLUTION RESPIRATORY (INHALATION)
Status: DISCONTINUED | OUTPATIENT
Start: 2019-09-15 | End: 2019-09-15

## 2019-09-15 RX ORDER — SODIUM CHLORIDE 0.9 % (FLUSH) 0.9 %
10 SYRINGE (ML) INJECTION PRN
Status: DISCONTINUED | OUTPATIENT
Start: 2019-09-15 | End: 2019-09-15 | Stop reason: HOSPADM

## 2019-09-15 RX ORDER — DEXTROSE MONOHYDRATE 25 G/50ML
12.5 INJECTION, SOLUTION INTRAVENOUS PRN
Status: DISCONTINUED | OUTPATIENT
Start: 2019-09-15 | End: 2019-09-15 | Stop reason: HOSPADM

## 2019-09-15 RX ORDER — FUROSEMIDE 20 MG/1
20 TABLET ORAL DAILY PRN
Status: DISCONTINUED | OUTPATIENT
Start: 2019-09-15 | End: 2019-09-15 | Stop reason: HOSPADM

## 2019-09-15 RX ORDER — IPRATROPIUM BROMIDE AND ALBUTEROL SULFATE 2.5; .5 MG/3ML; MG/3ML
1 SOLUTION RESPIRATORY (INHALATION) ONCE
Status: DISCONTINUED | OUTPATIENT
Start: 2019-09-15 | End: 2019-09-15

## 2019-09-15 RX ORDER — NICOTINE POLACRILEX 4 MG
15 LOZENGE BUCCAL PRN
Status: DISCONTINUED | OUTPATIENT
Start: 2019-09-15 | End: 2019-09-15 | Stop reason: HOSPADM

## 2019-09-15 RX ORDER — ONDANSETRON 2 MG/ML
4 INJECTION INTRAMUSCULAR; INTRAVENOUS EVERY 8 HOURS PRN
Status: DISCONTINUED | OUTPATIENT
Start: 2019-09-15 | End: 2019-09-15 | Stop reason: HOSPADM

## 2019-09-15 RX ORDER — DOCUSATE SODIUM 100 MG/1
100 CAPSULE, LIQUID FILLED ORAL 2 TIMES DAILY
Status: DISCONTINUED | OUTPATIENT
Start: 2019-09-15 | End: 2019-09-15 | Stop reason: HOSPADM

## 2019-09-15 RX ORDER — DEXTROSE MONOHYDRATE 50 MG/ML
100 INJECTION, SOLUTION INTRAVENOUS PRN
Status: DISCONTINUED | OUTPATIENT
Start: 2019-09-15 | End: 2019-09-15 | Stop reason: HOSPADM

## 2019-09-15 RX ORDER — ALBUTEROL SULFATE 2.5 MG/3ML
2.5 SOLUTION RESPIRATORY (INHALATION) EVERY 6 HOURS PRN
Status: DISCONTINUED | OUTPATIENT
Start: 2019-09-15 | End: 2019-09-15 | Stop reason: HOSPADM

## 2019-09-15 RX ORDER — IPRATROPIUM BROMIDE AND ALBUTEROL SULFATE 2.5; .5 MG/3ML; MG/3ML
3 SOLUTION RESPIRATORY (INHALATION) 3 TIMES DAILY
Status: DISCONTINUED | OUTPATIENT
Start: 2019-09-15 | End: 2019-09-15 | Stop reason: HOSPADM

## 2019-09-15 RX ORDER — HYDROCODONE BITARTRATE AND ACETAMINOPHEN 5; 325 MG/1; MG/1
2 TABLET ORAL EVERY 4 HOURS PRN
Status: DISCONTINUED | OUTPATIENT
Start: 2019-09-15 | End: 2019-09-15 | Stop reason: HOSPADM

## 2019-09-15 RX ORDER — INSULIN GLARGINE 100 [IU]/ML
45 INJECTION, SOLUTION SUBCUTANEOUS EVERY MORNING
Status: DISCONTINUED | OUTPATIENT
Start: 2019-09-15 | End: 2019-09-15 | Stop reason: HOSPADM

## 2019-09-15 RX ORDER — SODIUM CHLORIDE 0.9 % (FLUSH) 0.9 %
10 SYRINGE (ML) INJECTION EVERY 12 HOURS SCHEDULED
Status: DISCONTINUED | OUTPATIENT
Start: 2019-09-15 | End: 2019-09-15 | Stop reason: HOSPADM

## 2019-09-15 RX ORDER — LOSARTAN POTASSIUM 25 MG/1
25 TABLET ORAL DAILY
Status: DISCONTINUED | OUTPATIENT
Start: 2019-09-15 | End: 2019-09-15 | Stop reason: HOSPADM

## 2019-09-15 RX ORDER — FAMOTIDINE 20 MG/1
20 TABLET, FILM COATED ORAL DAILY
Status: DISCONTINUED | OUTPATIENT
Start: 2019-09-15 | End: 2019-09-15 | Stop reason: HOSPADM

## 2019-09-15 RX ORDER — HYDROCODONE BITARTRATE AND ACETAMINOPHEN 5; 325 MG/1; MG/1
1 TABLET ORAL EVERY 4 HOURS PRN
Status: DISCONTINUED | OUTPATIENT
Start: 2019-09-15 | End: 2019-09-15 | Stop reason: HOSPADM

## 2019-09-15 RX ORDER — IPRATROPIUM BROMIDE AND ALBUTEROL SULFATE 2.5; .5 MG/3ML; MG/3ML
3 SOLUTION RESPIRATORY (INHALATION) EVERY 4 HOURS
Status: DISCONTINUED | OUTPATIENT
Start: 2019-09-15 | End: 2019-09-15

## 2019-09-15 RX ORDER — CITALOPRAM 20 MG/1
40 TABLET ORAL DAILY
Status: DISCONTINUED | OUTPATIENT
Start: 2019-09-15 | End: 2019-09-15 | Stop reason: HOSPADM

## 2019-09-15 RX ORDER — CLOPIDOGREL BISULFATE 75 MG/1
75 TABLET ORAL DAILY
Status: DISCONTINUED | OUTPATIENT
Start: 2019-09-15 | End: 2019-09-15 | Stop reason: HOSPADM

## 2019-09-15 RX ORDER — LANOLIN ALCOHOL/MO/W.PET/CERES
325 CREAM (GRAM) TOPICAL
Status: DISCONTINUED | OUTPATIENT
Start: 2019-09-15 | End: 2019-09-15 | Stop reason: HOSPADM

## 2019-09-15 RX ORDER — UREA 10 %
10 LOTION (ML) TOPICAL NIGHTLY
Status: DISCONTINUED | OUTPATIENT
Start: 2019-09-15 | End: 2019-09-15 | Stop reason: HOSPADM

## 2019-09-15 RX ORDER — TOPIRAMATE 100 MG/1
100 TABLET, FILM COATED ORAL NIGHTLY
Status: DISCONTINUED | OUTPATIENT
Start: 2019-09-15 | End: 2019-09-15 | Stop reason: HOSPADM

## 2019-09-15 RX ORDER — PANTOPRAZOLE SODIUM 40 MG/1
40 TABLET, DELAYED RELEASE ORAL 2 TIMES DAILY
Status: DISCONTINUED | OUTPATIENT
Start: 2019-09-15 | End: 2019-09-15 | Stop reason: HOSPADM

## 2019-09-15 RX ORDER — CARVEDILOL 3.12 MG/1
3.12 TABLET ORAL 2 TIMES DAILY WITH MEALS
Status: DISCONTINUED | OUTPATIENT
Start: 2019-09-15 | End: 2019-09-15 | Stop reason: HOSPADM

## 2019-09-15 RX ORDER — INSULIN GLARGINE 100 [IU]/ML
35 INJECTION, SOLUTION SUBCUTANEOUS NIGHTLY
Status: DISCONTINUED | OUTPATIENT
Start: 2019-09-15 | End: 2019-09-15 | Stop reason: HOSPADM

## 2019-09-15 RX ORDER — ACETAMINOPHEN 325 MG/1
650 TABLET ORAL EVERY 4 HOURS PRN
Status: DISCONTINUED | OUTPATIENT
Start: 2019-09-15 | End: 2019-09-15 | Stop reason: HOSPADM

## 2019-09-15 RX ORDER — NITROGLYCERIN 0.4 MG/1
0.4 TABLET SUBLINGUAL EVERY 5 MIN PRN
Status: DISCONTINUED | OUTPATIENT
Start: 2019-09-15 | End: 2019-09-15 | Stop reason: HOSPADM

## 2019-09-15 RX ADMIN — HYDROCODONE BITARTRATE AND ACETAMINOPHEN 2 TABLET: 5; 325 TABLET ORAL at 10:34

## 2019-09-15 RX ADMIN — PANTOPRAZOLE SODIUM 40 MG: 40 TABLET, DELAYED RELEASE ORAL at 10:35

## 2019-09-15 RX ADMIN — IPRATROPIUM BROMIDE AND ALBUTEROL SULFATE 3 ML: .5; 3 SOLUTION RESPIRATORY (INHALATION) at 09:59

## 2019-09-15 RX ADMIN — CARVEDILOL 3.12 MG: 3.12 TABLET, FILM COATED ORAL at 10:35

## 2019-09-15 RX ADMIN — DOCUSATE SODIUM 100 MG: 100 CAPSULE, LIQUID FILLED ORAL at 10:35

## 2019-09-15 RX ADMIN — INSULIN LISPRO 4 UNITS: 100 INJECTION, SOLUTION INTRAVENOUS; SUBCUTANEOUS at 17:24

## 2019-09-15 RX ADMIN — INSULIN LISPRO 4 UNITS: 100 INJECTION, SOLUTION INTRAVENOUS; SUBCUTANEOUS at 10:34

## 2019-09-15 RX ADMIN — INSULIN GLARGINE 45 UNITS: 100 INJECTION, SOLUTION SUBCUTANEOUS at 14:53

## 2019-09-15 RX ADMIN — IPRATROPIUM BROMIDE AND ALBUTEROL SULFATE 3 ML: .5; 3 SOLUTION RESPIRATORY (INHALATION) at 15:22

## 2019-09-15 RX ADMIN — HYDROCODONE BITARTRATE AND ACETAMINOPHEN 2 TABLET: 5; 325 TABLET ORAL at 17:24

## 2019-09-15 RX ADMIN — CLOPIDOGREL 75 MG: 75 TABLET, FILM COATED ORAL at 10:34

## 2019-09-15 RX ADMIN — INSULIN LISPRO 10 UNITS: 100 INJECTION, SOLUTION INTRAVENOUS; SUBCUTANEOUS at 12:59

## 2019-09-15 RX ADMIN — FERROUS SULFATE TAB EC 325 MG (65 MG FE EQUIVALENT) 325 MG: 325 (65 FE) TABLET DELAYED RESPONSE at 10:35

## 2019-09-15 RX ADMIN — CITALOPRAM 40 MG: 20 TABLET, FILM COATED ORAL at 10:34

## 2019-09-15 RX ADMIN — FAMOTIDINE 20 MG: 20 TABLET, FILM COATED ORAL at 10:35

## 2019-09-15 RX ADMIN — MAGNESIUM GLUCONATE 500 MG ORAL TABLET 400 MG: 500 TABLET ORAL at 10:35

## 2019-09-15 RX ADMIN — LOSARTAN POTASSIUM 25 MG: 25 TABLET, FILM COATED ORAL at 10:35

## 2019-09-15 RX ADMIN — Medication 10 ML: at 10:35

## 2019-09-15 ASSESSMENT — PAIN SCALES - GENERAL
PAINLEVEL_OUTOF10: 8

## 2019-09-15 NOTE — DISCHARGE SUMMARY
aspart 100 UNIT/ML injection pen  Commonly known as:  NOVOLOG  If <139 - No Insulin; 140-199-2 Units;200-249-4 Units;250-299-6 Units;300-349-8 Units;350-400=10 Units; Above 400-12 Units     ipratropium-albuterol 0.5-2.5 (3) MG/3ML Soln nebulizer solution  Commonly known as:  DUONEB  Inhale 3 mLs into the lungs every 4 hours     LANTUS SOLOSTAR 100 UNIT/ML injection pen  Generic drug:  insulin glargine  INJECT 45 UNITS IN THE MORNING ,AND 35 UNITS NIGHTLY     Lift Chair Misc  by Does not apply route Use daily at home to help with ADL's     losartan 25 MG tablet  Commonly known as:  COZAAR  TAKE 1 TAB BY MOUTH ONCE A DAY     magnesium oxide 400 MG tablet  Commonly known as:  MAG-OX  TAKE 1 TAB BY MOUTH TWICE A DAY ( AM AND BEDTIME )     Melatonin 10 MG Tabs  Take 10 mg by mouth nightly     metFORMIN 1000 MG tablet  Commonly known as:  GLUCOPHAGE  TAKE 1 TAB BY MOUTH TWICE A DAY ( AM AND BEDTIME )     naloxone 4 MG/0.1ML Liqd nasal spray  1 spray by Nasal route as needed for Opioid Reversal     nitroGLYCERIN 0.4 MG SL tablet  Commonly known as:  NITROSTAT     nystatin 326403 UNIT/GM powder  Commonly known as:  MYCOSTATIN  Apply 3 times daily. oxyCODONE-acetaminophen 5-325 MG per tablet  Commonly known as:  PERCOCET  Take 1 tablet by mouth See Admin Instructions for 30 days. Intended supply: 30 days.  1 tab 3-4 times a day prn  Start taking on:  9/16/2019     OXYGEN     pantoprazole 40 MG tablet  Commonly known as:  PROTONIX  Take 1 tablet by mouth 2 times daily     ranitidine 150 MG tablet  Commonly known as:  ZANTAC  TAKE 1 TAB BY MOUTH TWICE A DAY ( AM AND BEDTIME )     SYMBICORT 160-4.5 MCG/ACT Aero  Generic drug:  budesonide-formoterol     topiramate 100 MG tablet  Commonly known as:  TOPAMAX  Take 1 tablet by mouth nightly     TRUE METRIX BLOOD GLUCOSE TEST strip  Generic drug:  blood glucose test strips  THREE TIMES A DAY     ULTICARE MINI PEN NEEDLES 31G X 6 MM Misc  Generic drug:  Insulin Pen Needle  USE AS

## 2019-09-15 NOTE — H&P
indicates moderate risk for MACE  20.3% (OBS)  8-10 pts indicates high risk for MACE  72.7% (EARLY INVASIVE [place])  CDU MEETA / Anselmo Morris is a 70 y.o. female who presents with     1) acute hyperglycemia secondary to corticosteroid induced hyperglycemia  BHB levels WNL. No acidosis on VBG. Glucose initially 500 now in the 200s. Receiving IV normal saline. Some improvement. No longer in pain. Nausea and vomiting have resolved    2) acute nausea and vomiting secondary to #1  Resolved      -  -      · Continue home medications and pain control  · Monitor vitals, labs, and imaging  · DISPO: pending consults and clinical improvement    CONSULTS:    None    PROCEDURES:  Not indicated       PATIENT REFERRED TO:    MD Julio Joe. Jodi Arvizu  [id number] [facility] [phone]  [address] [place] [address],[address] Msb, DO   Observation Resident   This dictation was generated by voice recognition computer software. Although all attempts are made to edit the dictation for accuracy, there may be errors in the transcription that are not intended.

## 2019-09-15 NOTE — ED NOTES
Pt resting in bed with eyes closed, even and unlabored respirations noted. Will continue to monitor.       Rosey Botello RN  09/14/19 [id number]

## 2019-09-15 NOTE — PROGRESS NOTES
BARBARA MITCHELL, [facility]. Hyperglycemia [R73.9] . Vitals:    09/15/19 0000   BP: 133/66   Pulse: 79   Resp: 20   Temp: 97.8 °F (36.6 °C)   SpO2: 97%   . Patients home meds are   Prior to Admission medications    Medication Sig Start Date End Date Taking? Authorizing Provider   oxyCODONE-acetaminophen (PERCOCET) 5-325 MG per tablet Take 1 tablet by mouth See Admin Instructions for 30 days. Intended supply: 30 days. 1 tab 3-4 times a day prn 9/16/19 10/16/19  Jack Robledo MD   naloxone 4 MG/0.1ML LIQD nasal spray 1 spray by Nasal route as needed for Opioid Reversal 9/13/19   Jack Robledo MD   ranitidine (ZANTAC) 150 MG tablet TAKE 1 TAB BY MOUTH TWICE A DAY ( AM AND BEDTIME ) 8/16/19   Hussain Nunes MD   ferrous sulfate 325 (65 Fe) MG tablet TAKE 1 TAB BY MOUTH EVERY MORNING 8/8/19   Hussain Nunes MD   magnesium oxide (MAG-OX) 400 MG tablet TAKE 1 TAB BY MOUTH TWICE A DAY ( AM AND BEDTIME ) 8/8/19   Hussain Nunse MD   carvedilol (COREG) 3.125 MG tablet TAKE 1 TAB BY MOUTH TWICE A DAY ( AM AND BEDTIME ) 8/8/19   Hussain Nunes MD   metFORMIN (GLUCOPHAGE) 1000 MG tablet TAKE 1 TAB BY MOUTH TWICE A DAY ( AM AND BEDTIME ) 8/8/19   MD MEENA Cortes 100 UNIT/ML injection pen INJECT 45 UNITS IN THE MORNING ,AND 35 UNITS NIGHTLY 8/8/19   Hussain Nunes MD   ULTICARE MINI PEN NEEDLES 31G X 6 MM MISC USE AS DIRECTED 8/8/19   Hussain Nunes MD   topiramate (TOPAMAX) 100 MG tablet Take 1 tablet by mouth nightly 7/16/19   VERONIKA Krishnamurthy - CNP   insulin aspart (NOVOLOG FLEXPEN) 100 UNIT/ML injection pen If <139 - No Insulin; 140-199-2 Units;200-249-4 Units;250-299-6 Units;300-349-8 Units;350-400=10 Units; Above 400-12 Units 5/13/19   Hussain Nunes MD   ibuprofen (IBU) 800 MG tablet Take 1 tablet by mouth every 8 hours as needed for Pain 4/14/19   Mame Mcmahan DO   citalopram (CELEXA) 40 MG tablet TAKE 1/2  TAB BY MOUTH TWICE  A DAY 4/2/19   Hussain Nunes MD consolidation  []  Unavailable   Cough []  Strong, productive cough []  Weak productive cough []  No cough or weak non-productive cough   BARBARA MITCHELL  9:46 AM                            FEMALE                                  MALE                            FEV1 Predicted Normal Values                        FEV1 Predicted Normal Values          Age                                     Height in Feet and Inches       Age                                     Height in Feet and Inches       4' 11\" 5' 1\" 5' 3\" 5' 5\" 5' 7\" 5' 9\" 5' 11\" 6' 1\"  4' 11\" 5' 1\" 5' 3\" 5' 5\" 5' 7\" 5' 9\" 5' 11\" 6' 1\"   42 - 45 2.49 2.66 2.84 3.03 3.22 3.42 3.62 3.83 42 - 45 2.82 3.03 3.26 3.49 3.72 3.96 4.22 4.47   46 - 49 2.40 2.57 2.76 2.94 3.14 3.33 3.54 3.75 46 - 49 2.70 2.92 3.14 3.37 3.61 3.85 4.10 4.36   50 - 53 2.31 2.48 2.66 2.85 3.04 3.24 3.45 3.66 50 - 53 2.58 2.80 3.02 3.25 3.49 3.73 3.98 4.24   54 - 57 2.21 2.38 2.57 2.75 2.95 3.14 3.35 3.56 54 - 57 2.46 2.67 2.89 3.12 3.36 3.60 3.85 4.11   58 - 61 2.10 2.28 2.46 2.65 2.84 3.04 3.24 3.45 58 - 61 2.32 2.54 2.76 2.99 3.23 3.47 3.72 3.98   62 - 65 1.99 2.17 2.35 2.54 2.73 2.93 3.13 3.34 62 - 65 2.19 2.40 2.62 2.85 3.09 3.33 3.58 3.84   66 - 69 1.88 2.05 2.23 2.42 2.61 2.81 3.02 3.23 66 - 69 2.04 2.26 2.48 2.71 2.95 3.19 3.44 3.70   70+ 1.82 1.99 2.17 2.36 2.55 2.75 2.95 3.16 70+ 1.97 2.19 2.41 2.64 2.87 3.12 3.37 3.62             Predicted Peak Expiratory Flow Rate                                       Height (in)  Female       Height (in) Male           Age 58 63 61 63 56 77 78 74 Age            20 449 553 [phone]  93 21 99 61 18 70 72 74 76   25 337 352 366 381 396 411 426 441 25 447 476 505 533 562 [phone]   30 329 344 359 374 389 404 419 434 30 437 466 494 523 552 [phone]   35 322 337 351 366 381 396 411 426 35 426 455 484 512 541 [phone]   40 314 329 344 359 374 389 404 419 40 416 445 473 502 531 [phone]   45 307 322 336 [phone]

## 2019-09-16 ENCOUNTER — TELEPHONE (OUTPATIENT)
Dept: PAIN MANAGEMENT | Age: 69
End: 2019-09-16

## 2019-09-16 ENCOUNTER — TELEPHONE (OUTPATIENT)
Dept: FAMILY MEDICINE CLINIC | Age: 69
End: 2019-09-16

## 2019-09-16 LAB
EKG ATRIAL RATE: 78 BPM
EKG P AXIS: 57 DEGREES
EKG P-R INTERVAL: 126 MS
EKG Q-T INTERVAL: 376 MS
EKG QRS DURATION: 68 MS
EKG QTC CALCULATION (BAZETT): 428 MS
EKG R AXIS: -12 DEGREES
EKG T AXIS: 78 DEGREES
EKG VENTRICULAR RATE: 78 BPM

## 2019-09-24 ENCOUNTER — OFFICE VISIT (OUTPATIENT)
Dept: FAMILY MEDICINE CLINIC | Age: 69
End: 2019-09-24
Payer: COMMERCIAL

## 2019-09-24 ENCOUNTER — TELEPHONE (OUTPATIENT)
Dept: FAMILY MEDICINE CLINIC | Age: 69
End: 2019-09-24

## 2019-09-24 VITALS
SYSTOLIC BLOOD PRESSURE: 126 MMHG | TEMPERATURE: 97.2 F | HEIGHT: 62 IN | HEART RATE: 63 BPM | WEIGHT: 254 LBS | DIASTOLIC BLOOD PRESSURE: 60 MMHG | BODY MASS INDEX: 46.74 KG/M2 | OXYGEN SATURATION: 94 %

## 2019-09-24 DIAGNOSIS — E78.2 MIXED HYPERLIPIDEMIA: ICD-10-CM

## 2019-09-24 DIAGNOSIS — N18.30 STAGE 3 CHRONIC KIDNEY DISEASE (HCC): ICD-10-CM

## 2019-09-24 DIAGNOSIS — R73.9 HYPERGLYCEMIA: ICD-10-CM

## 2019-09-24 DIAGNOSIS — M51.36 DDD (DEGENERATIVE DISC DISEASE), LUMBAR: ICD-10-CM

## 2019-09-24 DIAGNOSIS — Z79.4 TYPE 2 DIABETES MELLITUS WITH DIABETIC POLYNEUROPATHY, WITH LONG-TERM CURRENT USE OF INSULIN (HCC): Primary | ICD-10-CM

## 2019-09-24 DIAGNOSIS — M47.817 LUMBOSACRAL SPONDYLOSIS WITHOUT MYELOPATHY: Chronic | ICD-10-CM

## 2019-09-24 DIAGNOSIS — E11.42 TYPE 2 DIABETES MELLITUS WITH DIABETIC POLYNEUROPATHY, WITH LONG-TERM CURRENT USE OF INSULIN (HCC): Primary | ICD-10-CM

## 2019-09-24 DIAGNOSIS — I10 HTN (HYPERTENSION), BENIGN: Chronic | ICD-10-CM

## 2019-09-24 PROBLEM — R10.9 INTRACTABLE ABDOMINAL PAIN: Status: RESOLVED | Noted: 2019-03-14 | Resolved: 2019-09-24

## 2019-09-24 LAB — HBA1C MFR BLD: 7.8 %

## 2019-09-24 PROCEDURE — 1111F DSCHRG MED/CURRENT MED MERGE: CPT | Performed by: FAMILY MEDICINE

## 2019-09-24 PROCEDURE — 99495 TRANSJ CARE MGMT MOD F2F 14D: CPT | Performed by: FAMILY MEDICINE

## 2019-09-24 PROCEDURE — 83036 HEMOGLOBIN GLYCOSYLATED A1C: CPT | Performed by: FAMILY MEDICINE

## 2019-09-24 RX ORDER — ATORVASTATIN CALCIUM 20 MG/1
20 TABLET, FILM COATED ORAL DAILY
Qty: 30 TABLET | Refills: 3 | Status: ON HOLD | OUTPATIENT
Start: 2019-09-24 | End: 2019-11-02 | Stop reason: SDUPTHER

## 2019-09-24 ASSESSMENT — ENCOUNTER SYMPTOMS
CONSTIPATION: 0
ABDOMINAL PAIN: 0
ABDOMINAL DISTENTION: 0
SHORTNESS OF BREATH: 0
BLOOD IN STOOL: 0
NAUSEA: 0
COUGH: 0
BACK PAIN: 1
PHOTOPHOBIA: 0
COLOR CHANGE: 0
VOMITING: 0
RECTAL PAIN: 0
WHEEZING: 1
RHINORRHEA: 0
SINUS PRESSURE: 0
SINUS PAIN: 0

## 2019-09-24 ASSESSMENT — PATIENT HEALTH QUESTIONNAIRE - PHQ9
SUM OF ALL RESPONSES TO PHQ9 QUESTIONS 1 & 2: 1
1. LITTLE INTEREST OR PLEASURE IN DOING THINGS: 1
SUM OF ALL RESPONSES TO PHQ QUESTIONS 1-9: 1
SUM OF ALL RESPONSES TO PHQ QUESTIONS 1-9: 1
2. FEELING DOWN, DEPRESSED OR HOPELESS: 0

## 2019-09-24 NOTE — PROGRESS NOTES
Post-Discharge Transitional Care Management Services or Hospital Follow Up      Rhett Tucker   YOB: 1950    Date of Office Visit:  9/24/2019  Date of Hospital Admission: 9/14/19  Date of Hospital Discharge: 9/15/19  Readmission Risk Score(high >=14%.  Medium >=10%):Readmission Risk Score: 22      Care management risk score Rising risk (score 2-5) and Complex Care (Scores >=6): 9     Non face to face  following discharge, date last encounter closed (first attempt may have been earlier): 9/16/2019 10:08 AM 9/16/2019 10:08 AM    Call initiated 2 business days of discharge: Yes     Patient Active Problem List   Diagnosis    Chronic pain of left knee    Type 2 diabetes mellitus with diabetic polyneuropathy, with long-term current use of insulin (HCC)    Nonintractable migraine, unspecified migraine type    Coronary artery disease due to lipid rich plaque    DDD (degenerative disc disease), lumbar    Chronic, continuous use of opioids    DDD (degenerative disc disease), cervical    Osteoarthritis of cervical spine    Occipital neuralgia    Medication monitoring encounter    GERD (gastroesophageal reflux disease)    HTN (hypertension), benign    Mixed hyperlipidemia    Insomnia    Lumbosacral spondylosis without myelopathy    Sacroiliitis, not elsewhere classified ([address]    Carpal tunnel syndrome    Mixed stress and urge urinary incontinence    Bilateral low back pain with sciatica    Intractable migraine with aura without status migrainosus    Spondylarthrosis    Anxiety and depression    Chronic migraine without aura without status migrainosus, not intractable    Pulmonary embolism (HCC)    Obesity, morbid, BMI 40.0-49.9 ([facility])    Nodule of right lung    Neuropathy    Obstructive sleep apnea syndrome    Asthma with COPD ([address]    Gastroesophageal reflux disease with esophagitis    Morbid obesity with BMI of 40.0-44.9, adult ([address]    Congestive heart failure ([address]    citalopram (CELEXA) 40 MG tablet TAKE 1/2  TAB BY MOUTH TWICE  A DAY 90 tablet 3    pantoprazole (PROTONIX) 40 MG tablet Take 1 tablet by mouth 2 times daily 60 tablet 3    TRUE METRIX BLOOD GLUCOSE TEST strip THREE TIMES A DAY 1 each 3    Alcohol Swabs (B-D SINGLE USE SWABS REGULAR) PADS THREE TIMES A  each 0    nystatin (MYCOSTATIN) 495846 UNIT/GM powder Apply 3 times daily. 3 Bottle 3    furosemide (LASIX) 20 MG tablet Take 1 tablet by mouth daily as needed (weight gain 3 lbs overnight) 30 tablet 3    losartan (COZAAR) 25 MG tablet TAKE 1 TAB BY MOUTH ONCE A DAY 90 tablet 5    Lift Chair MISC by Does not apply route Use daily at home to help with ADL's 1 each 0    clopidogrel (PLAVIX) 75 MG tablet TAKE 1 TAB BY MOUTH ONCE A DAY 90 tablet 3    nitroGLYCERIN (NITROSTAT) 0.4 MG SL tablet 1 under the tongue as needed for angina, may repeat q5mins for up three doses      Blood Glucose Monitoring Suppl HARMEET Use daily to check BS 1 Device 0    ipratropium-albuterol (DUONEB) 0.5-2.5 (3) MG/3ML SOLN nebulizer solution Inhale 3 mLs into the lungs every 4 hours 360 mL 2    Melatonin 10 MG TABS Take 10 mg by mouth nightly 90 tablet 3    Compression Stockings MISC by Does not apply route Pressure between 20 - 25 . 1 each 0    docusate sodium (COLACE) 100 MG capsule Take 1 capsule by mouth 2 times daily Please use while taking Percocet 30 capsule 0    SYMBICORT 160-4.5 MCG/ACT AERO   2 puffs As needed for sob      OXYGEN Inhale 3 L into the lungs           Medications patient taking as of now reconciled against medications ordered at time of hospital discharge: No    Chief Complaint   Patient presents with    Follow-Up from Hospital     hyperglycemia       HPI: Patient is here for hospital follow-up, was admitted in office unit. Patient reports she had steroid shot in her back given by pain management and the next day she felt heavy in her lower extremities and numb, her sugars were running high.   She 09/24/19 254 lb (115.2 kg)   09/15/19 257 lb (116.6 kg)   09/13/19 253 lb (114.8 kg)     BP Readings from Last 3 Encounters:   09/24/19 126/60   09/15/19 (!) 140/82   09/13/19 131/74       Physical Exam   Constitutional: She appears well-developed and well-nourished. HENT:   Head: Normocephalic and atraumatic. Eyes: Pupils are equal, round, and reactive to light. EOM are normal.   Neck: Normal range of motion. Neck supple. Cardiovascular: Normal rate and regular rhythm. Pulmonary/Chest: Effort normal and breath sounds normal. No respiratory distress. She has no wheezes. She has no rales. Abdominal: Soft. Bowel sounds are normal. She exhibits no distension and no mass. There is no tenderness. Musculoskeletal:        Lumbar back: She exhibits decreased range of motion, tenderness, pain and spasm. Decreased numbness in both lower extremities, on wheelchair   Psychiatric: Thought content normal. Her mood appears anxious. Her speech is rapid and/or pressured. Her speech is not delayed and not tangential. She is slowed and actively hallucinating. She is not agitated. Thought content is not paranoid and not delusional. Cognition and memory are not impaired. She exhibits a depressed mood. She expresses no suicidal ideation. She expresses no suicidal plans and no homicidal plans. She is communicative. She exhibits normal recent memory. She is attentive. Nursing note and vitals reviewed. Assessment/Plan:  1. Type 2 diabetes mellitus with diabetic polyneuropathy, with long-term current use of insulin (HCC)  A1c has increased but still controlled, discussed increase Lantus by 2 units keep fasting blood sugars under 150.  - POCT glycosylated hemoglobin (Hb A1C)    2. HTN (hypertension), benign  Controlled continue same medications    3. Hyperglycemia  Hyperglycemia has improved increase insulin and sliding scale    4.  DDD (degenerative disc disease), lumbar  Follows with pain management-  - Lift Chair MISC; by Does not apply route  Dispense: 1 each; Refill: 0  - Misc. Devices (ADJUST BATH/SHOWER SEAT/BACK) MISC; Use daily for shower  Dispense: 1 each; Refill: 0    5. Stage 3 chronic kidney disease (HCC)  Stable continue same medications    6. Mixed hyperlipidemia  Restarted back on Lipitor, repeat lipid panel  - Lipid Panel; Future  - atorvastatin (LIPITOR) 20 MG tablet; Take 1 tablet by mouth daily  Dispense: 30 tablet; Refill: 3    7. Lumbosacral spondylosis without myelopathy  -  - Lift Chair MISC; by Does not apply route  Dispense: 1 each; Refill: 0  - Misc. Devices (ADJUST BATH/SHOWER SEAT/BACK) MISC; Use daily for shower  Dispense: 1 each;  Refill: 0        Medical Decision Making: high complexity

## 2019-10-10 ENCOUNTER — HOSPITAL ENCOUNTER (OUTPATIENT)
Dept: PAIN MANAGEMENT | Age: 69
Discharge: HOME | End: 2019-10-10
Payer: COMMERCIAL

## 2019-10-10 VITALS
RESPIRATION RATE: 18 BRPM | OXYGEN SATURATION: 95 % | HEART RATE: 73 BPM | HEIGHT: 62 IN | WEIGHT: 250 LBS | DIASTOLIC BLOOD PRESSURE: 64 MMHG | SYSTOLIC BLOOD PRESSURE: 144 MMHG | TEMPERATURE: 98.6 F | BODY MASS INDEX: 46.01 KG/M2

## 2019-10-10 DIAGNOSIS — Z51.81 MEDICATION MONITORING ENCOUNTER: ICD-10-CM

## 2019-10-10 DIAGNOSIS — M50.30 DDD (DEGENERATIVE DISC DISEASE), CERVICAL: ICD-10-CM

## 2019-10-10 DIAGNOSIS — M54.16 LUMBAR RADICULOPATHY, CHRONIC: Primary | ICD-10-CM

## 2019-10-10 DIAGNOSIS — E11.42 TYPE 2 DIABETES MELLITUS WITH DIABETIC POLYNEUROPATHY, WITH LONG-TERM CURRENT USE OF INSULIN (HCC): Chronic | ICD-10-CM

## 2019-10-10 DIAGNOSIS — M54.81 BILATERAL OCCIPITAL NEURALGIA: ICD-10-CM

## 2019-10-10 DIAGNOSIS — E66.01 MORBID OBESITY (HCC): ICD-10-CM

## 2019-10-10 DIAGNOSIS — G62.9 NEUROPATHY: ICD-10-CM

## 2019-10-10 DIAGNOSIS — M46.1 SACROILIITIS, NOT ELSEWHERE CLASSIFIED (HCC): ICD-10-CM

## 2019-10-10 DIAGNOSIS — M47.817 LUMBOSACRAL SPONDYLOSIS WITHOUT MYELOPATHY: ICD-10-CM

## 2019-10-10 DIAGNOSIS — Z79.4 TYPE 2 DIABETES MELLITUS WITH DIABETIC POLYNEUROPATHY, WITH LONG-TERM CURRENT USE OF INSULIN (HCC): Chronic | ICD-10-CM

## 2019-10-10 PROCEDURE — 99214 OFFICE O/P EST MOD 30 MIN: CPT | Performed by: PAIN MEDICINE

## 2019-10-10 PROCEDURE — 99213 OFFICE O/P EST LOW 20 MIN: CPT

## 2019-10-10 RX ORDER — GABAPENTIN 300 MG/1
300 CAPSULE ORAL 3 TIMES DAILY
Qty: 90 CAPSULE | Refills: 0 | Status: SHIPPED | OUTPATIENT
Start: 2019-10-10 | End: 2019-12-04 | Stop reason: SDUPTHER

## 2019-10-10 RX ORDER — OXYCODONE HYDROCHLORIDE AND ACETAMINOPHEN 5; 325 MG/1; MG/1
1 TABLET ORAL SEE ADMIN INSTRUCTIONS
Qty: 100 TABLET | Refills: 0 | Status: SHIPPED | OUTPATIENT
Start: 2019-10-10 | End: 2019-10-10 | Stop reason: SDUPTHER

## 2019-10-10 RX ORDER — NALOXONE HYDROCHLORIDE 4 MG/.1ML
1 SPRAY NASAL PRN
Qty: 1 EACH | Refills: 5 | Status: SHIPPED | OUTPATIENT
Start: 2019-10-10 | End: 2020-07-30 | Stop reason: SDUPTHER

## 2019-10-10 RX ORDER — OXYCODONE HYDROCHLORIDE AND ACETAMINOPHEN 5; 325 MG/1; MG/1
1 TABLET ORAL SEE ADMIN INSTRUCTIONS
Qty: 100 TABLET | Refills: 0 | Status: ON HOLD | OUTPATIENT
Start: 2019-10-16 | End: 2019-11-07 | Stop reason: SDUPTHER

## 2019-10-10 ASSESSMENT — ENCOUNTER SYMPTOMS
APNEA: 0
RESPIRATORY NEGATIVE: 1
NAUSEA: 0
ABDOMINAL PAIN: 0
RECTAL PAIN: 0
COUGH: 0
PHOTOPHOBIA: 0
CONSTIPATION: 0
EYES NEGATIVE: 1
GASTROINTESTINAL NEGATIVE: 1
SHORTNESS OF BREATH: 0
ALLERGIC/IMMUNOLOGIC NEGATIVE: 1

## 2019-10-10 ASSESSMENT — PAIN DESCRIPTION - ORIENTATION: ORIENTATION: RIGHT

## 2019-10-10 ASSESSMENT — PAIN DESCRIPTION - PROGRESSION: CLINICAL_PROGRESSION: NOT CHANGED

## 2019-10-10 ASSESSMENT — PAIN DESCRIPTION - LOCATION: LOCATION: BACK;HEAD

## 2019-10-10 ASSESSMENT — PAIN DESCRIPTION - PAIN TYPE: TYPE: CHRONIC PAIN

## 2019-10-10 ASSESSMENT — PAIN DESCRIPTION - ONSET: ONSET: ON-GOING

## 2019-10-10 ASSESSMENT — PAIN DESCRIPTION - DESCRIPTORS: DESCRIPTORS: ACHING;CONSTANT;SHARP

## 2019-10-10 ASSESSMENT — PAIN SCALES - GENERAL: PAINLEVEL_OUTOF10: 8

## 2019-10-10 ASSESSMENT — PAIN DESCRIPTION - FREQUENCY: FREQUENCY: CONTINUOUS

## 2019-10-22 RX ORDER — CLOPIDOGREL BISULFATE 75 MG/1
TABLET ORAL
Qty: 90 TABLET | Refills: 0 | Status: SHIPPED | OUTPATIENT
Start: 2019-10-22 | End: 2020-03-27 | Stop reason: SDUPTHER

## 2019-10-23 ENCOUNTER — APPOINTMENT (OUTPATIENT)
Dept: MRI IMAGING | Age: 69
End: 2019-10-23
Payer: COMMERCIAL

## 2019-10-23 ENCOUNTER — HOSPITAL ENCOUNTER (EMERGENCY)
Age: 69
Discharge: HOME | End: 2019-10-23
Attending: EMERGENCY MEDICINE
Payer: COMMERCIAL

## 2019-10-23 VITALS
DIASTOLIC BLOOD PRESSURE: 73 MMHG | OXYGEN SATURATION: 96 % | TEMPERATURE: 98 F | SYSTOLIC BLOOD PRESSURE: 127 MMHG | HEART RATE: 96 BPM | RESPIRATION RATE: 15 BRPM

## 2019-10-23 DIAGNOSIS — M54.41 ACUTE RIGHT-SIDED BACK PAIN WITH SCIATICA: Primary | ICD-10-CM

## 2019-10-23 LAB
ABSOLUTE EOS #: 0.14 K/UL (ref 0–0.44)
ABSOLUTE IMMATURE GRANULOCYTE: 0.04 K/UL (ref 0–0.3)
ABSOLUTE LYMPH #: 1.54 K/UL (ref 1.1–3.7)
ABSOLUTE MONO #: 0.56 K/UL (ref 0.1–1.2)
ALBUMIN SERPL-MCNC: 3.7 G/DL (ref 3.5–5.2)
ALBUMIN/GLOBULIN RATIO: 1.2 (ref 1–2.5)
ALP BLD-CCNC: 60 U/L (ref 35–104)
ALT SERPL-CCNC: 12 U/L (ref 5–33)
ANION GAP SERPL CALCULATED.3IONS-SCNC: 9 MMOL/L (ref 9–17)
AST SERPL-CCNC: 12 U/L
BASOPHILS # BLD: 1 % (ref 0–2)
BASOPHILS ABSOLUTE: 0.04 K/UL (ref 0–0.2)
BILIRUB SERPL-MCNC: 0.19 MG/DL (ref 0.3–1.2)
BILIRUBIN DIRECT: <0.08 MG/DL
BILIRUBIN, INDIRECT: ABNORMAL MG/DL (ref 0–1)
BUN BLDV-MCNC: 22 MG/DL (ref 8–23)
BUN/CREAT BLD: ABNORMAL (ref 9–20)
C-REACTIVE PROTEIN: 0.9 MG/L (ref 0–5)
CALCIUM SERPL-MCNC: 9.1 MG/DL (ref 8.6–10.4)
CHLORIDE BLD-SCNC: 99 MMOL/L (ref 98–107)
CO2: 28 MMOL/L (ref 20–31)
CREAT SERPL-MCNC: 0.87 MG/DL (ref 0.5–0.9)
DIFFERENTIAL TYPE: ABNORMAL
EOSINOPHILS RELATIVE PERCENT: 2 % (ref 1–4)
GFR AFRICAN AMERICAN: >60 ML/MIN
GFR NON-AFRICAN AMERICAN: >60 ML/MIN
GFR SERPL CREATININE-BSD FRML MDRD: ABNORMAL ML/MIN/{1.73_M2}
GFR SERPL CREATININE-BSD FRML MDRD: ABNORMAL ML/MIN/{1.73_M2}
GLOBULIN: ABNORMAL G/DL (ref 1.5–3.8)
GLUCOSE BLD-MCNC: 180 MG/DL (ref 70–99)
HCT VFR BLD CALC: 37.6 % (ref 36.3–47.1)
HEMOGLOBIN: 11.7 G/DL (ref 11.9–15.1)
IMMATURE GRANULOCYTES: 1 %
LACTIC ACID, WHOLE BLOOD: 1.6 MMOL/L (ref 0.7–2.1)
LACTIC ACID: NORMAL MMOL/L
LYMPHOCYTES # BLD: 23 % (ref 24–43)
MCH RBC QN AUTO: 30.2 PG (ref 25.2–33.5)
MCHC RBC AUTO-ENTMCNC: 31.1 G/DL (ref 28.4–34.8)
MCV RBC AUTO: 97.2 FL (ref 82.6–102.9)
MONOCYTES # BLD: 9 % (ref 3–12)
NRBC AUTOMATED: 0 PER 100 WBC
PDW BLD-RTO: 13.2 % (ref 11.8–14.4)
PLATELET # BLD: 181 K/UL (ref 138–453)
PLATELET ESTIMATE: ABNORMAL
PMV BLD AUTO: 11.4 FL (ref 8.1–13.5)
POTASSIUM SERPL-SCNC: 4.4 MMOL/L (ref 3.7–5.3)
RBC # BLD: 3.87 M/UL (ref 3.95–5.11)
RBC # BLD: ABNORMAL 10*6/UL
SEDIMENTATION RATE, ERYTHROCYTE: 20 MM (ref 0–20)
SEG NEUTROPHILS: 64 % (ref 36–65)
SEGMENTED NEUTROPHILS ABSOLUTE COUNT: 4.26 K/UL (ref 1.5–8.1)
SODIUM BLD-SCNC: 136 MMOL/L (ref 135–144)
TOTAL PROTEIN: 6.9 G/DL (ref 6.4–8.3)
WBC # BLD: 6.6 K/UL (ref 3.5–11.3)
WBC # BLD: ABNORMAL 10*3/UL

## 2019-10-23 PROCEDURE — 80048 BASIC METABOLIC PNL TOTAL CA: CPT

## 2019-10-23 PROCEDURE — A9576 INJ PROHANCE MULTIPACK: HCPCS | Performed by: EMERGENCY MEDICINE

## 2019-10-23 PROCEDURE — 80076 HEPATIC FUNCTION PANEL: CPT

## 2019-10-23 PROCEDURE — 83605 ASSAY OF LACTIC ACID: CPT

## 2019-10-23 PROCEDURE — 6360000004 HC RX CONTRAST MEDICATION: Performed by: EMERGENCY MEDICINE

## 2019-10-23 PROCEDURE — 86140 C-REACTIVE PROTEIN: CPT

## 2019-10-23 PROCEDURE — 85651 RBC SED RATE NONAUTOMATED: CPT

## 2019-10-23 PROCEDURE — 99284 EMERGENCY DEPT VISIT MOD MDM: CPT

## 2019-10-23 PROCEDURE — 6370000000 HC RX 637 (ALT 250 FOR IP): Performed by: EMERGENCY MEDICINE

## 2019-10-23 PROCEDURE — 72158 MRI LUMBAR SPINE W/O & W/DYE: CPT

## 2019-10-23 PROCEDURE — 85025 COMPLETE CBC W/AUTO DIFF WBC: CPT

## 2019-10-23 RX ORDER — SODIUM CHLORIDE 0.9 % (FLUSH) 0.9 %
10 SYRINGE (ML) INJECTION ONCE
Status: DISCONTINUED | OUTPATIENT
Start: 2019-10-23 | End: 2019-10-23 | Stop reason: HOSPADM

## 2019-10-23 RX ORDER — LIDOCAINE 50 MG/G
1 PATCH TOPICAL EVERY 24 HOURS
Qty: 30 PATCH | Refills: 0 | Status: SHIPPED | OUTPATIENT
Start: 2019-10-23 | End: 2019-11-22

## 2019-10-23 RX ORDER — NAPROXEN 500 MG/1
500 TABLET ORAL 2 TIMES DAILY PRN
Qty: 20 TABLET | Refills: 0 | Status: ON HOLD | OUTPATIENT
Start: 2019-10-23 | End: 2019-11-02 | Stop reason: HOSPADM

## 2019-10-23 RX ORDER — CYCLOBENZAPRINE HCL 10 MG
10 TABLET ORAL ONCE
Status: COMPLETED | OUTPATIENT
Start: 2019-10-23 | End: 2019-10-23

## 2019-10-23 RX ORDER — ACETAMINOPHEN 500 MG
1000 TABLET ORAL ONCE
Status: COMPLETED | OUTPATIENT
Start: 2019-10-23 | End: 2019-10-23

## 2019-10-23 RX ORDER — CYCLOBENZAPRINE HCL 10 MG
10 TABLET ORAL 3 TIMES DAILY PRN
Qty: 20 TABLET | Refills: 0 | Status: SHIPPED | OUTPATIENT
Start: 2019-10-23 | End: 2019-10-30

## 2019-10-23 RX ADMIN — GADOTERIDOL 20 ML: 279.3 INJECTION, SOLUTION INTRAVENOUS at 18:20

## 2019-10-23 RX ADMIN — CYCLOBENZAPRINE 10 MG: 10 TABLET, FILM COATED ORAL at 16:40

## 2019-10-23 RX ADMIN — ACETAMINOPHEN 1000 MG: 500 TABLET ORAL at 16:40

## 2019-10-23 ASSESSMENT — ENCOUNTER SYMPTOMS
ABDOMINAL DISTENTION: 0
SHORTNESS OF BREATH: 0
NAUSEA: 0
SORE THROAT: 0
EYE PAIN: 0
CHEST TIGHTNESS: 0
VOMITING: 0
BACK PAIN: 1
PHOTOPHOBIA: 0
SINUS PAIN: 0
ABDOMINAL PAIN: 0

## 2019-10-23 ASSESSMENT — PAIN DESCRIPTION - FREQUENCY: FREQUENCY: CONTINUOUS

## 2019-10-23 ASSESSMENT — PAIN SCALES - GENERAL: PAINLEVEL_OUTOF10: 10

## 2019-10-23 ASSESSMENT — PAIN DESCRIPTION - LOCATION: LOCATION: BACK;HIP

## 2019-10-23 ASSESSMENT — PAIN DESCRIPTION - PAIN TYPE: TYPE: ACUTE PAIN

## 2019-10-23 ASSESSMENT — PAIN DESCRIPTION - DESCRIPTORS: DESCRIPTORS: NUMBNESS;SHARP

## 2019-10-23 ASSESSMENT — PAIN DESCRIPTION - PROGRESSION: CLINICAL_PROGRESSION: NOT CHANGED

## 2019-10-24 ENCOUNTER — TELEPHONE (OUTPATIENT)
Dept: FAMILY MEDICINE CLINIC | Age: 69
End: 2019-10-24

## 2019-10-31 ENCOUNTER — APPOINTMENT (OUTPATIENT)
Dept: GENERAL RADIOLOGY | Age: 69
DRG: 074 | End: 2019-10-31
Payer: COMMERCIAL

## 2019-10-31 ENCOUNTER — APPOINTMENT (OUTPATIENT)
Dept: CT IMAGING | Age: 69
DRG: 074 | End: 2019-10-31
Payer: COMMERCIAL

## 2019-10-31 ENCOUNTER — HOSPITAL ENCOUNTER (INPATIENT)
Age: 69
LOS: 7 days | Discharge: HOME | DRG: 074 | End: 2019-11-07
Attending: EMERGENCY MEDICINE | Admitting: INTERNAL MEDICINE
Payer: COMMERCIAL

## 2019-10-31 DIAGNOSIS — M47.817 LUMBOSACRAL SPONDYLOSIS WITHOUT MYELOPATHY: ICD-10-CM

## 2019-10-31 DIAGNOSIS — G45.9 TIA (TRANSIENT ISCHEMIC ATTACK): Primary | ICD-10-CM

## 2019-10-31 DIAGNOSIS — M46.1 SACROILIITIS, NOT ELSEWHERE CLASSIFIED (HCC): ICD-10-CM

## 2019-10-31 DIAGNOSIS — M54.16 LUMBAR RADICULOPATHY, CHRONIC: ICD-10-CM

## 2019-10-31 DIAGNOSIS — E78.2 MIXED HYPERLIPIDEMIA: ICD-10-CM

## 2019-10-31 DIAGNOSIS — M50.30 DDD (DEGENERATIVE DISC DISEASE), CERVICAL: ICD-10-CM

## 2019-10-31 LAB
ABSOLUTE EOS #: 0.18 K/UL (ref 0–0.44)
ABSOLUTE EOS #: 0.19 K/UL (ref 0–0.44)
ABSOLUTE IMMATURE GRANULOCYTE: 0.04 K/UL (ref 0–0.3)
ABSOLUTE IMMATURE GRANULOCYTE: 0.04 K/UL (ref 0–0.3)
ABSOLUTE LYMPH #: 1.74 K/UL (ref 1.1–3.7)
ABSOLUTE LYMPH #: 1.94 K/UL (ref 1.1–3.7)
ABSOLUTE MONO #: 0.63 K/UL (ref 0.1–1.2)
ABSOLUTE MONO #: 0.65 K/UL (ref 0.1–1.2)
ALBUMIN SERPL-MCNC: 3.8 G/DL (ref 3.5–5.2)
ALBUMIN/GLOBULIN RATIO: 1.4 (ref 1–2.5)
ALP BLD-CCNC: 66 U/L (ref 35–104)
ALT SERPL-CCNC: 12 U/L (ref 5–33)
ANION GAP SERPL CALCULATED.3IONS-SCNC: 12 MMOL/L (ref 9–17)
ANION GAP SERPL CALCULATED.3IONS-SCNC: 13 MMOL/L (ref 9–17)
AST SERPL-CCNC: 13 U/L
BASOPHILS # BLD: 1 % (ref 0–2)
BASOPHILS # BLD: 1 % (ref 0–2)
BASOPHILS ABSOLUTE: 0.05 K/UL (ref 0–0.2)
BASOPHILS ABSOLUTE: 0.05 K/UL (ref 0–0.2)
BILIRUB SERPL-MCNC: <0.1 MG/DL (ref 0.3–1.2)
BUN BLDV-MCNC: 15 MG/DL (ref 8–23)
BUN BLDV-MCNC: 15 MG/DL (ref 8–23)
BUN/CREAT BLD: ABNORMAL (ref 9–20)
BUN/CREAT BLD: ABNORMAL (ref 9–20)
CALCIUM SERPL-MCNC: 8.8 MG/DL (ref 8.6–10.4)
CALCIUM SERPL-MCNC: 8.9 MG/DL (ref 8.6–10.4)
CHLORIDE BLD-SCNC: 100 MMOL/L (ref 98–107)
CHLORIDE BLD-SCNC: 100 MMOL/L (ref 98–107)
CO2: 25 MMOL/L (ref 20–31)
CO2: 27 MMOL/L (ref 20–31)
CREAT SERPL-MCNC: 1.06 MG/DL (ref 0.5–0.9)
CREAT SERPL-MCNC: 1.21 MG/DL (ref 0.5–0.9)
DIFFERENTIAL TYPE: ABNORMAL
DIFFERENTIAL TYPE: ABNORMAL
EKG ATRIAL RATE: 67 BPM
EKG P AXIS: 69 DEGREES
EKG P-R INTERVAL: 138 MS
EKG Q-T INTERVAL: 362 MS
EKG QRS DURATION: 70 MS
EKG QTC CALCULATION (BAZETT): 382 MS
EKG R AXIS: -11 DEGREES
EKG T AXIS: 81 DEGREES
EKG VENTRICULAR RATE: 67 BPM
EOSINOPHILS RELATIVE PERCENT: 3 % (ref 1–4)
EOSINOPHILS RELATIVE PERCENT: 3 % (ref 1–4)
ESTIMATED AVERAGE GLUCOSE: 203 MG/DL
GFR AFRICAN AMERICAN: 53 ML/MIN
GFR AFRICAN AMERICAN: >60 ML/MIN
GFR NON-AFRICAN AMERICAN: 44 ML/MIN
GFR NON-AFRICAN AMERICAN: 51 ML/MIN
GFR SERPL CREATININE-BSD FRML MDRD: ABNORMAL ML/MIN/{1.73_M2}
GLUCOSE BLD-MCNC: 164 MG/DL (ref 65–105)
GLUCOSE BLD-MCNC: 175 MG/DL (ref 70–99)
GLUCOSE BLD-MCNC: 220 MG/DL (ref 70–99)
HBA1C MFR BLD: 8.7 % (ref 4–6)
HCT VFR BLD CALC: 37.5 % (ref 36.3–47.1)
HCT VFR BLD CALC: 37.6 % (ref 36.3–47.1)
HEMOGLOBIN: 11.5 G/DL (ref 11.9–15.1)
HEMOGLOBIN: 11.6 G/DL (ref 11.9–15.1)
IMMATURE GRANULOCYTES: 1 %
IMMATURE GRANULOCYTES: 1 %
INR BLD: 1
LYMPHOCYTES # BLD: 24 % (ref 24–43)
LYMPHOCYTES # BLD: 30 % (ref 24–43)
MCH RBC QN AUTO: 30.4 PG (ref 25.2–33.5)
MCH RBC QN AUTO: 30.5 PG (ref 25.2–33.5)
MCHC RBC AUTO-ENTMCNC: 30.6 G/DL (ref 28.4–34.8)
MCHC RBC AUTO-ENTMCNC: 30.9 G/DL (ref 28.4–34.8)
MCV RBC AUTO: 98.2 FL (ref 82.6–102.9)
MCV RBC AUTO: 99.7 FL (ref 82.6–102.9)
MONOCYTES # BLD: 10 % (ref 3–12)
MONOCYTES # BLD: 9 % (ref 3–12)
NRBC AUTOMATED: 0 PER 100 WBC
NRBC AUTOMATED: 0 PER 100 WBC
PDW BLD-RTO: 13.3 % (ref 11.8–14.4)
PDW BLD-RTO: 13.4 % (ref 11.8–14.4)
PLATELET # BLD: 183 K/UL (ref 138–453)
PLATELET # BLD: 195 K/UL (ref 138–453)
PLATELET ESTIMATE: ABNORMAL
PLATELET ESTIMATE: ABNORMAL
PMV BLD AUTO: 11.5 FL (ref 8.1–13.5)
PMV BLD AUTO: 11.5 FL (ref 8.1–13.5)
POTASSIUM SERPL-SCNC: 4.2 MMOL/L (ref 3.7–5.3)
POTASSIUM SERPL-SCNC: 4.5 MMOL/L (ref 3.7–5.3)
PROTHROMBIN TIME: 10.5 SEC (ref 9–12)
RBC # BLD: 3.77 M/UL (ref 3.95–5.11)
RBC # BLD: 3.82 M/UL (ref 3.95–5.11)
RBC # BLD: ABNORMAL 10*6/UL
RBC # BLD: ABNORMAL 10*6/UL
SEG NEUTROPHILS: 55 % (ref 36–65)
SEG NEUTROPHILS: 62 % (ref 36–65)
SEGMENTED NEUTROPHILS ABSOLUTE COUNT: 3.59 K/UL (ref 1.5–8.1)
SEGMENTED NEUTROPHILS ABSOLUTE COUNT: 4.59 K/UL (ref 1.5–8.1)
SODIUM BLD-SCNC: 138 MMOL/L (ref 135–144)
SODIUM BLD-SCNC: 139 MMOL/L (ref 135–144)
TOTAL PROTEIN: 6.6 G/DL (ref 6.4–8.3)
TROPONIN INTERP: NORMAL
TROPONIN INTERP: NORMAL
TROPONIN T: NORMAL NG/ML
TROPONIN T: NORMAL NG/ML
TROPONIN, HIGH SENSITIVITY: 8 NG/L (ref 0–14)
TROPONIN, HIGH SENSITIVITY: 9 NG/L (ref 0–14)
WBC # BLD: 6.4 K/UL (ref 3.5–11.3)
WBC # BLD: 7.3 K/UL (ref 3.5–11.3)
WBC # BLD: ABNORMAL 10*3/UL
WBC # BLD: ABNORMAL 10*3/UL

## 2019-10-31 PROCEDURE — 2060000000 HC ICU INTERMEDIATE R&B

## 2019-10-31 PROCEDURE — 80048 BASIC METABOLIC PNL TOTAL CA: CPT

## 2019-10-31 PROCEDURE — 2580000003 HC RX 258: Performed by: INTERNAL MEDICINE

## 2019-10-31 PROCEDURE — 94640 AIRWAY INHALATION TREATMENT: CPT

## 2019-10-31 PROCEDURE — 84484 ASSAY OF TROPONIN QUANT: CPT

## 2019-10-31 PROCEDURE — 93005 ELECTROCARDIOGRAM TRACING: CPT | Performed by: PHYSICIAN ASSISTANT

## 2019-10-31 PROCEDURE — 70450 CT HEAD/BRAIN W/O DYE: CPT

## 2019-10-31 PROCEDURE — 93010 ELECTROCARDIOGRAM REPORT: CPT | Performed by: INTERNAL MEDICINE

## 2019-10-31 PROCEDURE — 71045 X-RAY EXAM CHEST 1 VIEW: CPT

## 2019-10-31 PROCEDURE — 93880 EXTRACRANIAL BILAT STUDY: CPT

## 2019-10-31 PROCEDURE — 85025 COMPLETE CBC W/AUTO DIFF WBC: CPT

## 2019-10-31 PROCEDURE — 85610 PROTHROMBIN TIME: CPT

## 2019-10-31 PROCEDURE — 6370000000 HC RX 637 (ALT 250 FOR IP): Performed by: NURSE PRACTITIONER

## 2019-10-31 PROCEDURE — 6370000000 HC RX 637 (ALT 250 FOR IP): Performed by: STUDENT IN AN ORGANIZED HEALTH CARE EDUCATION/TRAINING PROGRAM

## 2019-10-31 PROCEDURE — 82947 ASSAY GLUCOSE BLOOD QUANT: CPT

## 2019-10-31 PROCEDURE — 6370000000 HC RX 637 (ALT 250 FOR IP): Performed by: INTERNAL MEDICINE

## 2019-10-31 PROCEDURE — 80053 COMPREHEN METABOLIC PANEL: CPT

## 2019-10-31 PROCEDURE — 36415 COLL VENOUS BLD VENIPUNCTURE: CPT

## 2019-10-31 PROCEDURE — 83036 HEMOGLOBIN GLYCOSYLATED A1C: CPT

## 2019-10-31 PROCEDURE — 99285 EMERGENCY DEPT VISIT HI MDM: CPT

## 2019-10-31 RX ORDER — SODIUM CHLORIDE 9 MG/ML
INJECTION, SOLUTION INTRAVENOUS CONTINUOUS
Status: DISCONTINUED | OUTPATIENT
Start: 2019-10-31 | End: 2019-11-06

## 2019-10-31 RX ORDER — DEXTROSE MONOHYDRATE 25 G/50ML
12.5 INJECTION, SOLUTION INTRAVENOUS PRN
Status: DISCONTINUED | OUTPATIENT
Start: 2019-10-31 | End: 2019-11-07 | Stop reason: HOSPADM

## 2019-10-31 RX ORDER — ASPIRIN 81 MG/1
81 TABLET, CHEWABLE ORAL DAILY
Status: DISCONTINUED | OUTPATIENT
Start: 2019-10-31 | End: 2019-11-07 | Stop reason: HOSPADM

## 2019-10-31 RX ORDER — UREA 10 %
3 LOTION (ML) TOPICAL NIGHTLY PRN
Status: DISCONTINUED | OUTPATIENT
Start: 2019-10-31 | End: 2019-11-07 | Stop reason: HOSPADM

## 2019-10-31 RX ORDER — IPRATROPIUM BROMIDE AND ALBUTEROL SULFATE 2.5; .5 MG/3ML; MG/3ML
3 SOLUTION RESPIRATORY (INHALATION) EVERY 4 HOURS
Status: DISCONTINUED | OUTPATIENT
Start: 2019-10-31 | End: 2019-11-02

## 2019-10-31 RX ORDER — CITALOPRAM 10 MG/1
10 TABLET ORAL DAILY
Status: DISCONTINUED | OUTPATIENT
Start: 2019-10-31 | End: 2019-11-07 | Stop reason: HOSPADM

## 2019-10-31 RX ORDER — CARVEDILOL 3.12 MG/1
3.12 TABLET ORAL 2 TIMES DAILY WITH MEALS
Status: DISCONTINUED | OUTPATIENT
Start: 2019-10-31 | End: 2019-11-07 | Stop reason: HOSPADM

## 2019-10-31 RX ORDER — NICOTINE POLACRILEX 4 MG
15 LOZENGE BUCCAL PRN
Status: DISCONTINUED | OUTPATIENT
Start: 2019-10-31 | End: 2019-11-07 | Stop reason: HOSPADM

## 2019-10-31 RX ORDER — OXYCODONE HYDROCHLORIDE AND ACETAMINOPHEN 5; 325 MG/1; MG/1
1 TABLET ORAL EVERY 4 HOURS PRN
Status: DISCONTINUED | OUTPATIENT
Start: 2019-10-31 | End: 2019-11-07 | Stop reason: HOSPADM

## 2019-10-31 RX ORDER — OXYCODONE HYDROCHLORIDE AND ACETAMINOPHEN 5; 325 MG/1; MG/1
2 TABLET ORAL EVERY 4 HOURS PRN
Status: DISCONTINUED | OUTPATIENT
Start: 2019-10-31 | End: 2019-11-07 | Stop reason: HOSPADM

## 2019-10-31 RX ORDER — DOCUSATE SODIUM 100 MG/1
100 CAPSULE, LIQUID FILLED ORAL 2 TIMES DAILY
Status: DISCONTINUED | OUTPATIENT
Start: 2019-10-31 | End: 2019-11-07 | Stop reason: HOSPADM

## 2019-10-31 RX ORDER — GABAPENTIN 300 MG/1
300 CAPSULE ORAL 3 TIMES DAILY
Status: DISCONTINUED | OUTPATIENT
Start: 2019-10-31 | End: 2019-11-07 | Stop reason: HOSPADM

## 2019-10-31 RX ORDER — INSULIN GLARGINE 100 [IU]/ML
30 INJECTION, SOLUTION SUBCUTANEOUS NIGHTLY
Status: DISCONTINUED | OUTPATIENT
Start: 2019-10-31 | End: 2019-11-04

## 2019-10-31 RX ORDER — PANTOPRAZOLE SODIUM 40 MG/1
40 TABLET, DELAYED RELEASE ORAL 2 TIMES DAILY
Status: DISCONTINUED | OUTPATIENT
Start: 2019-10-31 | End: 2019-11-07 | Stop reason: HOSPADM

## 2019-10-31 RX ORDER — CLOPIDOGREL BISULFATE 75 MG/1
75 TABLET ORAL DAILY
Status: DISCONTINUED | OUTPATIENT
Start: 2019-10-31 | End: 2019-11-07 | Stop reason: HOSPADM

## 2019-10-31 RX ORDER — DEXTROSE MONOHYDRATE 50 MG/ML
100 INJECTION, SOLUTION INTRAVENOUS PRN
Status: DISCONTINUED | OUTPATIENT
Start: 2019-10-31 | End: 2019-11-07 | Stop reason: HOSPADM

## 2019-10-31 RX ORDER — TOPIRAMATE 100 MG/1
100 TABLET, FILM COATED ORAL NIGHTLY
Status: DISCONTINUED | OUTPATIENT
Start: 2019-10-31 | End: 2019-11-07 | Stop reason: HOSPADM

## 2019-10-31 RX ORDER — ATORVASTATIN CALCIUM 20 MG/1
20 TABLET, FILM COATED ORAL DAILY
Status: DISCONTINUED | OUTPATIENT
Start: 2019-10-31 | End: 2019-11-01

## 2019-10-31 RX ADMIN — DOCUSATE SODIUM 100 MG: 100 CAPSULE, LIQUID FILLED ORAL at 20:55

## 2019-10-31 RX ADMIN — CITALOPRAM 10 MG: 10 TABLET, FILM COATED ORAL at 20:55

## 2019-10-31 RX ADMIN — CLOPIDOGREL 75 MG: 75 TABLET, FILM COATED ORAL at 20:55

## 2019-10-31 RX ADMIN — ATORVASTATIN CALCIUM 20 MG: 20 TABLET, FILM COATED ORAL at 20:55

## 2019-10-31 RX ADMIN — IPRATROPIUM BROMIDE AND ALBUTEROL SULFATE 3 ML: .5; 3 SOLUTION RESPIRATORY (INHALATION) at 20:56

## 2019-10-31 RX ADMIN — Medication 3 MG: at 22:44

## 2019-10-31 RX ADMIN — INSULIN LISPRO 1 UNITS: 100 INJECTION, SOLUTION INTRAVENOUS; SUBCUTANEOUS at 21:14

## 2019-10-31 RX ADMIN — TOPIRAMATE 100 MG: 100 TABLET, FILM COATED ORAL at 20:55

## 2019-10-31 RX ADMIN — OXYCODONE HYDROCHLORIDE AND ACETAMINOPHEN 2 TABLET: 5; 325 TABLET ORAL at 20:13

## 2019-10-31 RX ADMIN — ASPIRIN 81 MG 81 MG: 81 TABLET ORAL at 17:00

## 2019-10-31 RX ADMIN — SODIUM CHLORIDE: 9 INJECTION, SOLUTION INTRAVENOUS at 21:17

## 2019-10-31 RX ADMIN — INSULIN GLARGINE 30 UNITS: 100 INJECTION, SOLUTION SUBCUTANEOUS at 20:55

## 2019-10-31 RX ADMIN — PANTOPRAZOLE SODIUM 40 MG: 40 TABLET, DELAYED RELEASE ORAL at 20:59

## 2019-10-31 RX ADMIN — GABAPENTIN 300 MG: 300 CAPSULE ORAL at 20:55

## 2019-10-31 ASSESSMENT — ENCOUNTER SYMPTOMS
EYE ITCHING: 0
WHEEZING: 0
SORE THROAT: 0
NAUSEA: 0
COUGH: 0
BACK PAIN: 1
COLOR CHANGE: 0
EYE DISCHARGE: 0
RHINORRHEA: 0
VOMITING: 0
EYE PAIN: 0

## 2019-10-31 ASSESSMENT — PAIN SCALES - GENERAL: PAINLEVEL_OUTOF10: 8

## 2019-11-01 ENCOUNTER — APPOINTMENT (OUTPATIENT)
Dept: MRI IMAGING | Age: 69
DRG: 074 | End: 2019-11-01
Payer: COMMERCIAL

## 2019-11-01 LAB
CHOLESTEROL/HDL RATIO: 5.2
CHOLESTEROL: 193 MG/DL
GLUCOSE BLD-MCNC: 129 MG/DL (ref 65–105)
GLUCOSE BLD-MCNC: 222 MG/DL (ref 65–105)
GLUCOSE BLD-MCNC: 252 MG/DL (ref 65–105)
GLUCOSE BLD-MCNC: 252 MG/DL (ref 65–105)
HDLC SERPL-MCNC: 37 MG/DL
LDL CHOLESTEROL: 98 MG/DL (ref 0–130)
TRIGL SERPL-MCNC: 291 MG/DL
VLDLC SERPL CALC-MCNC: ABNORMAL MG/DL (ref 1–30)

## 2019-11-01 PROCEDURE — 2700000000 HC OXYGEN THERAPY PER DAY

## 2019-11-01 PROCEDURE — 94640 AIRWAY INHALATION TREATMENT: CPT

## 2019-11-01 PROCEDURE — 99222 1ST HOSP IP/OBS MODERATE 55: CPT | Performed by: PSYCHIATRY & NEUROLOGY

## 2019-11-01 PROCEDURE — 6370000000 HC RX 637 (ALT 250 FOR IP): Performed by: INTERNAL MEDICINE

## 2019-11-01 PROCEDURE — 99222 1ST HOSP IP/OBS MODERATE 55: CPT | Performed by: INTERNAL MEDICINE

## 2019-11-01 PROCEDURE — 94761 N-INVAS EAR/PLS OXIMETRY MLT: CPT

## 2019-11-01 PROCEDURE — 36415 COLL VENOUS BLD VENIPUNCTURE: CPT

## 2019-11-01 PROCEDURE — 82947 ASSAY GLUCOSE BLOOD QUANT: CPT

## 2019-11-01 PROCEDURE — 6370000000 HC RX 637 (ALT 250 FOR IP): Performed by: NURSE PRACTITIONER

## 2019-11-01 PROCEDURE — 70544 MR ANGIOGRAPHY HEAD W/O DYE: CPT

## 2019-11-01 PROCEDURE — 2060000000 HC ICU INTERMEDIATE R&B

## 2019-11-01 PROCEDURE — 2580000003 HC RX 258: Performed by: INTERNAL MEDICINE

## 2019-11-01 PROCEDURE — 70551 MRI BRAIN STEM W/O DYE: CPT

## 2019-11-01 PROCEDURE — 80061 LIPID PANEL: CPT

## 2019-11-01 RX ORDER — ATORVASTATIN CALCIUM 80 MG/1
80 TABLET, FILM COATED ORAL DAILY
Status: DISCONTINUED | OUTPATIENT
Start: 2019-11-02 | End: 2019-11-07 | Stop reason: HOSPADM

## 2019-11-01 RX ORDER — ATORVASTATIN CALCIUM 40 MG/1
40 TABLET, FILM COATED ORAL DAILY
Status: DISCONTINUED | OUTPATIENT
Start: 2019-11-02 | End: 2019-11-01

## 2019-11-01 RX ADMIN — SODIUM CHLORIDE: 9 INJECTION, SOLUTION INTRAVENOUS at 17:24

## 2019-11-01 RX ADMIN — INSULIN GLARGINE 30 UNITS: 100 INJECTION, SOLUTION SUBCUTANEOUS at 21:30

## 2019-11-01 RX ADMIN — ATORVASTATIN CALCIUM 20 MG: 20 TABLET, FILM COATED ORAL at 08:14

## 2019-11-01 RX ADMIN — CITALOPRAM 10 MG: 10 TABLET, FILM COATED ORAL at 08:13

## 2019-11-01 RX ADMIN — GABAPENTIN 300 MG: 300 CAPSULE ORAL at 08:13

## 2019-11-01 RX ADMIN — DOCUSATE SODIUM 100 MG: 100 CAPSULE, LIQUID FILLED ORAL at 21:26

## 2019-11-01 RX ADMIN — IPRATROPIUM BROMIDE AND ALBUTEROL SULFATE 3 ML: .5; 3 SOLUTION RESPIRATORY (INHALATION) at 07:56

## 2019-11-01 RX ADMIN — TOPIRAMATE 100 MG: 100 TABLET, FILM COATED ORAL at 21:26

## 2019-11-01 RX ADMIN — IPRATROPIUM BROMIDE AND ALBUTEROL SULFATE 3 ML: .5; 3 SOLUTION RESPIRATORY (INHALATION) at 23:34

## 2019-11-01 RX ADMIN — OXYCODONE HYDROCHLORIDE AND ACETAMINOPHEN 2 TABLET: 5; 325 TABLET ORAL at 21:26

## 2019-11-01 RX ADMIN — OXYCODONE HYDROCHLORIDE AND ACETAMINOPHEN 2 TABLET: 5; 325 TABLET ORAL at 08:11

## 2019-11-01 RX ADMIN — IPRATROPIUM BROMIDE AND ALBUTEROL SULFATE 3 ML: .5; 3 SOLUTION RESPIRATORY (INHALATION) at 12:43

## 2019-11-01 RX ADMIN — CLOPIDOGREL 75 MG: 75 TABLET, FILM COATED ORAL at 08:13

## 2019-11-01 RX ADMIN — PANTOPRAZOLE SODIUM 40 MG: 40 TABLET, DELAYED RELEASE ORAL at 21:26

## 2019-11-01 RX ADMIN — INSULIN LISPRO 3 UNITS: 100 INJECTION, SOLUTION INTRAVENOUS; SUBCUTANEOUS at 12:25

## 2019-11-01 RX ADMIN — CARVEDILOL 3.12 MG: 3.12 TABLET, FILM COATED ORAL at 17:23

## 2019-11-01 RX ADMIN — INSULIN LISPRO 2 UNITS: 100 INJECTION, SOLUTION INTRAVENOUS; SUBCUTANEOUS at 21:26

## 2019-11-01 RX ADMIN — PANTOPRAZOLE SODIUM 40 MG: 40 TABLET, DELAYED RELEASE ORAL at 08:14

## 2019-11-01 RX ADMIN — GABAPENTIN 300 MG: 300 CAPSULE ORAL at 14:22

## 2019-11-01 RX ADMIN — ASPIRIN 81 MG 81 MG: 81 TABLET ORAL at 08:13

## 2019-11-01 RX ADMIN — Medication 3 MG: at 21:25

## 2019-11-01 RX ADMIN — GABAPENTIN 300 MG: 300 CAPSULE ORAL at 21:26

## 2019-11-01 RX ADMIN — INSULIN LISPRO 2 UNITS: 100 INJECTION, SOLUTION INTRAVENOUS; SUBCUTANEOUS at 17:25

## 2019-11-01 RX ADMIN — CARVEDILOL 3.12 MG: 3.12 TABLET, FILM COATED ORAL at 08:13

## 2019-11-01 ASSESSMENT — PAIN DESCRIPTION - PROGRESSION
CLINICAL_PROGRESSION: NOT CHANGED

## 2019-11-01 ASSESSMENT — PAIN SCALES - GENERAL
PAINLEVEL_OUTOF10: 8
PAINLEVEL_OUTOF10: 4
PAINLEVEL_OUTOF10: 7

## 2019-11-01 ASSESSMENT — PAIN DESCRIPTION - FREQUENCY: FREQUENCY: CONTINUOUS

## 2019-11-01 ASSESSMENT — PAIN DESCRIPTION - DESCRIPTORS: DESCRIPTORS: THROBBING;NUMBNESS

## 2019-11-01 ASSESSMENT — PAIN DESCRIPTION - LOCATION: LOCATION: BACK

## 2019-11-01 ASSESSMENT — PAIN DESCRIPTION - ONSET: ONSET: ON-GOING

## 2019-11-01 ASSESSMENT — PAIN DESCRIPTION - ORIENTATION: ORIENTATION: RIGHT

## 2019-11-01 ASSESSMENT — PAIN DESCRIPTION - PAIN TYPE: TYPE: CHRONIC PAIN

## 2019-11-02 LAB
GLUCOSE BLD-MCNC: 196 MG/DL (ref 65–105)
GLUCOSE BLD-MCNC: 207 MG/DL (ref 65–105)
GLUCOSE BLD-MCNC: 219 MG/DL (ref 65–105)
GLUCOSE BLD-MCNC: 253 MG/DL (ref 65–105)

## 2019-11-02 PROCEDURE — 2060000000 HC ICU INTERMEDIATE R&B

## 2019-11-02 PROCEDURE — 6370000000 HC RX 637 (ALT 250 FOR IP): Performed by: INTERNAL MEDICINE

## 2019-11-02 PROCEDURE — 6360000002 HC RX W HCPCS: Performed by: INTERNAL MEDICINE

## 2019-11-02 PROCEDURE — G0008 ADMIN INFLUENZA VIRUS VAC: HCPCS | Performed by: INTERNAL MEDICINE

## 2019-11-02 PROCEDURE — 94640 AIRWAY INHALATION TREATMENT: CPT

## 2019-11-02 PROCEDURE — 94761 N-INVAS EAR/PLS OXIMETRY MLT: CPT

## 2019-11-02 PROCEDURE — 90686 IIV4 VACC NO PRSV 0.5 ML IM: CPT | Performed by: INTERNAL MEDICINE

## 2019-11-02 PROCEDURE — 99233 SBSQ HOSP IP/OBS HIGH 50: CPT | Performed by: INTERNAL MEDICINE

## 2019-11-02 PROCEDURE — 6370000000 HC RX 637 (ALT 250 FOR IP): Performed by: NURSE PRACTITIONER

## 2019-11-02 PROCEDURE — 99232 SBSQ HOSP IP/OBS MODERATE 35: CPT | Performed by: PSYCHIATRY & NEUROLOGY

## 2019-11-02 PROCEDURE — 2700000000 HC OXYGEN THERAPY PER DAY

## 2019-11-02 PROCEDURE — 82947 ASSAY GLUCOSE BLOOD QUANT: CPT

## 2019-11-02 PROCEDURE — 6370000000 HC RX 637 (ALT 250 FOR IP): Performed by: STUDENT IN AN ORGANIZED HEALTH CARE EDUCATION/TRAINING PROGRAM

## 2019-11-02 PROCEDURE — 2580000003 HC RX 258: Performed by: INTERNAL MEDICINE

## 2019-11-02 RX ORDER — ASPIRIN 81 MG/1
81 TABLET, CHEWABLE ORAL DAILY
Qty: 30 TABLET | Refills: 0 | Status: SHIPPED | OUTPATIENT
Start: 2019-11-03 | End: 2019-12-03 | Stop reason: SDUPTHER

## 2019-11-02 RX ORDER — IPRATROPIUM BROMIDE AND ALBUTEROL SULFATE 2.5; .5 MG/3ML; MG/3ML
3 SOLUTION RESPIRATORY (INHALATION) 3 TIMES DAILY
Status: DISCONTINUED | OUTPATIENT
Start: 2019-11-03 | End: 2019-11-05

## 2019-11-02 RX ORDER — ATORVASTATIN CALCIUM 80 MG/1
80 TABLET, FILM COATED ORAL DAILY
Qty: 30 TABLET | Refills: 0 | Status: SHIPPED | OUTPATIENT
Start: 2019-11-02 | End: 2019-12-03 | Stop reason: SDUPTHER

## 2019-11-02 RX ORDER — ALBUTEROL SULFATE 2.5 MG/3ML
2.5 SOLUTION RESPIRATORY (INHALATION)
Status: DISCONTINUED | OUTPATIENT
Start: 2019-11-02 | End: 2019-11-02

## 2019-11-02 RX ADMIN — INSULIN GLARGINE 30 UNITS: 100 INJECTION, SOLUTION SUBCUTANEOUS at 20:12

## 2019-11-02 RX ADMIN — GABAPENTIN 300 MG: 300 CAPSULE ORAL at 13:31

## 2019-11-02 RX ADMIN — INSULIN LISPRO 2 UNITS: 100 INJECTION, SOLUTION INTRAVENOUS; SUBCUTANEOUS at 09:04

## 2019-11-02 RX ADMIN — INSULIN LISPRO 2 UNITS: 100 INJECTION, SOLUTION INTRAVENOUS; SUBCUTANEOUS at 18:09

## 2019-11-02 RX ADMIN — GABAPENTIN 300 MG: 300 CAPSULE ORAL at 08:29

## 2019-11-02 RX ADMIN — SODIUM CHLORIDE: 9 INJECTION, SOLUTION INTRAVENOUS at 20:27

## 2019-11-02 RX ADMIN — DOCUSATE SODIUM 100 MG: 100 CAPSULE, LIQUID FILLED ORAL at 20:26

## 2019-11-02 RX ADMIN — CITALOPRAM 10 MG: 10 TABLET, FILM COATED ORAL at 08:30

## 2019-11-02 RX ADMIN — INFLUENZA A VIRUS A/BRISBANE/02/2018 IVR-190 (H1N1) ANTIGEN (PROPIOLACTONE INACTIVATED), INFLUENZA A VIRUS A/KANSAS/14/2017 X-327 (H3N2) ANTIGEN (PROPIOLACTONE INACTIVATED), INFLUENZA B VIRUS B/MARYLAND/15/2016 ANTIGEN (PROPIOLACTONE INACTIVATED), INFLUENZA B VIRUS B/PHUKET/3073/2013 BVR-1B ANTIGEN (PROPIOLACTONE INACTIVATED) 0.5 ML: 15; 15; 15; 15 INJECTION, SUSPENSION INTRAMUSCULAR at 11:36

## 2019-11-02 RX ADMIN — IPRATROPIUM BROMIDE AND ALBUTEROL SULFATE 3 ML: .5; 3 SOLUTION RESPIRATORY (INHALATION) at 04:50

## 2019-11-02 RX ADMIN — PANTOPRAZOLE SODIUM 40 MG: 40 TABLET, DELAYED RELEASE ORAL at 20:25

## 2019-11-02 RX ADMIN — TOPIRAMATE 100 MG: 100 TABLET, FILM COATED ORAL at 20:25

## 2019-11-02 RX ADMIN — ASPIRIN 81 MG 81 MG: 81 TABLET ORAL at 08:30

## 2019-11-02 RX ADMIN — CARVEDILOL 3.12 MG: 3.12 TABLET, FILM COATED ORAL at 08:30

## 2019-11-02 RX ADMIN — ATORVASTATIN CALCIUM 80 MG: 80 TABLET, FILM COATED ORAL at 08:30

## 2019-11-02 RX ADMIN — CLOPIDOGREL 75 MG: 75 TABLET, FILM COATED ORAL at 08:30

## 2019-11-02 RX ADMIN — GABAPENTIN 300 MG: 300 CAPSULE ORAL at 20:26

## 2019-11-02 RX ADMIN — INSULIN LISPRO 1 UNITS: 100 INJECTION, SOLUTION INTRAVENOUS; SUBCUTANEOUS at 13:31

## 2019-11-02 RX ADMIN — INSULIN LISPRO 2 UNITS: 100 INJECTION, SOLUTION INTRAVENOUS; SUBCUTANEOUS at 20:12

## 2019-11-02 RX ADMIN — PANTOPRAZOLE SODIUM 40 MG: 40 TABLET, DELAYED RELEASE ORAL at 08:29

## 2019-11-02 RX ADMIN — CARVEDILOL 3.12 MG: 3.12 TABLET, FILM COATED ORAL at 17:07

## 2019-11-02 RX ADMIN — IPRATROPIUM BROMIDE AND ALBUTEROL SULFATE 3 ML: .5; 3 SOLUTION RESPIRATORY (INHALATION) at 15:58

## 2019-11-03 LAB
GLUCOSE BLD-MCNC: 173 MG/DL (ref 65–105)
GLUCOSE BLD-MCNC: 216 MG/DL (ref 65–105)
GLUCOSE BLD-MCNC: 235 MG/DL (ref 65–105)
GLUCOSE BLD-MCNC: 291 MG/DL (ref 65–105)

## 2019-11-03 PROCEDURE — 2060000000 HC ICU INTERMEDIATE R&B

## 2019-11-03 PROCEDURE — 97162 PT EVAL MOD COMPLEX 30 MIN: CPT

## 2019-11-03 PROCEDURE — 6370000000 HC RX 637 (ALT 250 FOR IP): Performed by: INTERNAL MEDICINE

## 2019-11-03 PROCEDURE — 6370000000 HC RX 637 (ALT 250 FOR IP): Performed by: STUDENT IN AN ORGANIZED HEALTH CARE EDUCATION/TRAINING PROGRAM

## 2019-11-03 PROCEDURE — 94761 N-INVAS EAR/PLS OXIMETRY MLT: CPT

## 2019-11-03 PROCEDURE — 80053 COMPREHEN METABOLIC PANEL: CPT

## 2019-11-03 PROCEDURE — 6370000000 HC RX 637 (ALT 250 FOR IP): Performed by: NURSE PRACTITIONER

## 2019-11-03 PROCEDURE — 82947 ASSAY GLUCOSE BLOOD QUANT: CPT

## 2019-11-03 PROCEDURE — 99232 SBSQ HOSP IP/OBS MODERATE 35: CPT | Performed by: INTERNAL MEDICINE

## 2019-11-03 PROCEDURE — 97535 SELF CARE MNGMENT TRAINING: CPT

## 2019-11-03 PROCEDURE — 97166 OT EVAL MOD COMPLEX 45 MIN: CPT

## 2019-11-03 PROCEDURE — 85025 COMPLETE CBC W/AUTO DIFF WBC: CPT

## 2019-11-03 PROCEDURE — 97530 THERAPEUTIC ACTIVITIES: CPT

## 2019-11-03 PROCEDURE — 97116 GAIT TRAINING THERAPY: CPT

## 2019-11-03 PROCEDURE — 99233 SBSQ HOSP IP/OBS HIGH 50: CPT | Performed by: PSYCHIATRY & NEUROLOGY

## 2019-11-03 RX ADMIN — CITALOPRAM 10 MG: 10 TABLET, FILM COATED ORAL at 08:24

## 2019-11-03 RX ADMIN — DOCUSATE SODIUM 100 MG: 100 CAPSULE, LIQUID FILLED ORAL at 20:22

## 2019-11-03 RX ADMIN — ASPIRIN 81 MG 81 MG: 81 TABLET ORAL at 08:24

## 2019-11-03 RX ADMIN — INSULIN LISPRO 2 UNITS: 100 INJECTION, SOLUTION INTRAVENOUS; SUBCUTANEOUS at 18:13

## 2019-11-03 RX ADMIN — ATORVASTATIN CALCIUM 80 MG: 80 TABLET, FILM COATED ORAL at 08:24

## 2019-11-03 RX ADMIN — INSULIN LISPRO 1 UNITS: 100 INJECTION, SOLUTION INTRAVENOUS; SUBCUTANEOUS at 10:35

## 2019-11-03 RX ADMIN — GABAPENTIN 300 MG: 300 CAPSULE ORAL at 08:23

## 2019-11-03 RX ADMIN — PANTOPRAZOLE SODIUM 40 MG: 40 TABLET, DELAYED RELEASE ORAL at 08:23

## 2019-11-03 RX ADMIN — GABAPENTIN 300 MG: 300 CAPSULE ORAL at 20:22

## 2019-11-03 RX ADMIN — INSULIN LISPRO 2 UNITS: 100 INJECTION, SOLUTION INTRAVENOUS; SUBCUTANEOUS at 20:22

## 2019-11-03 RX ADMIN — Medication 3 MG: at 20:28

## 2019-11-03 RX ADMIN — CLOPIDOGREL 75 MG: 75 TABLET, FILM COATED ORAL at 08:24

## 2019-11-03 RX ADMIN — CARVEDILOL 3.12 MG: 3.12 TABLET, FILM COATED ORAL at 08:24

## 2019-11-03 RX ADMIN — CARVEDILOL 3.12 MG: 3.12 TABLET, FILM COATED ORAL at 18:11

## 2019-11-03 RX ADMIN — TOPIRAMATE 100 MG: 100 TABLET, FILM COATED ORAL at 20:22

## 2019-11-03 RX ADMIN — INSULIN GLARGINE 30 UNITS: 100 INJECTION, SOLUTION SUBCUTANEOUS at 20:22

## 2019-11-03 RX ADMIN — DOCUSATE SODIUM 100 MG: 100 CAPSULE, LIQUID FILLED ORAL at 08:24

## 2019-11-03 RX ADMIN — INSULIN LISPRO 2 UNITS: 100 INJECTION, SOLUTION INTRAVENOUS; SUBCUTANEOUS at 13:34

## 2019-11-03 RX ADMIN — GABAPENTIN 300 MG: 300 CAPSULE ORAL at 13:39

## 2019-11-03 RX ADMIN — PANTOPRAZOLE SODIUM 40 MG: 40 TABLET, DELAYED RELEASE ORAL at 20:22

## 2019-11-03 ASSESSMENT — PAIN SCALES - GENERAL
PAINLEVEL_OUTOF10: 5
PAINLEVEL_OUTOF10: 0
PAINLEVEL_OUTOF10: 5

## 2019-11-03 ASSESSMENT — PAIN DESCRIPTION - PAIN TYPE
TYPE: CHRONIC PAIN
TYPE: CHRONIC PAIN

## 2019-11-03 ASSESSMENT — PAIN DESCRIPTION - LOCATION
LOCATION: LEG
LOCATION: LEG

## 2019-11-03 ASSESSMENT — PAIN DESCRIPTION - ORIENTATION
ORIENTATION: RIGHT
ORIENTATION: RIGHT

## 2019-11-03 ASSESSMENT — PAIN DESCRIPTION - FREQUENCY: FREQUENCY: CONTINUOUS

## 2019-11-03 ASSESSMENT — PAIN DESCRIPTION - DESCRIPTORS: DESCRIPTORS: ACHING;TENDER;DISCOMFORT;SORE

## 2019-11-04 LAB
ABSOLUTE EOS #: 0.24 K/UL (ref 0–0.44)
ABSOLUTE IMMATURE GRANULOCYTE: 0.05 K/UL (ref 0–0.3)
ABSOLUTE LYMPH #: 1.64 K/UL (ref 1.1–3.7)
ABSOLUTE MONO #: 0.75 K/UL (ref 0.1–1.2)
ALBUMIN SERPL-MCNC: 3.7 G/DL (ref 3.5–5.2)
ALBUMIN/GLOBULIN RATIO: 1.3 (ref 1–2.5)
ALP BLD-CCNC: 57 U/L (ref 35–104)
ALT SERPL-CCNC: 9 U/L (ref 5–33)
ANION GAP SERPL CALCULATED.3IONS-SCNC: 11 MMOL/L (ref 9–17)
AST SERPL-CCNC: 9 U/L
BASOPHILS # BLD: 0 % (ref 0–2)
BASOPHILS ABSOLUTE: 0.03 K/UL (ref 0–0.2)
BILIRUB SERPL-MCNC: 0.31 MG/DL (ref 0.3–1.2)
BUN BLDV-MCNC: 20 MG/DL (ref 8–23)
BUN/CREAT BLD: ABNORMAL (ref 9–20)
CALCIUM SERPL-MCNC: 9 MG/DL (ref 8.6–10.4)
CHLORIDE BLD-SCNC: 100 MMOL/L (ref 98–107)
CO2: 28 MMOL/L (ref 20–31)
CREAT SERPL-MCNC: 1.1 MG/DL (ref 0.5–0.9)
DIFFERENTIAL TYPE: ABNORMAL
EKG ATRIAL RATE: 76 BPM
EKG P AXIS: 80 DEGREES
EKG P-R INTERVAL: 148 MS
EKG Q-T INTERVAL: 346 MS
EKG QRS DURATION: 68 MS
EKG QTC CALCULATION (BAZETT): 389 MS
EKG R AXIS: -24 DEGREES
EKG T AXIS: 77 DEGREES
EKG VENTRICULAR RATE: 76 BPM
EOSINOPHILS RELATIVE PERCENT: 3 % (ref 1–4)
GFR AFRICAN AMERICAN: 60 ML/MIN
GFR NON-AFRICAN AMERICAN: 49 ML/MIN
GFR SERPL CREATININE-BSD FRML MDRD: ABNORMAL ML/MIN/{1.73_M2}
GFR SERPL CREATININE-BSD FRML MDRD: ABNORMAL ML/MIN/{1.73_M2}
GLUCOSE BLD-MCNC: 207 MG/DL (ref 65–105)
GLUCOSE BLD-MCNC: 235 MG/DL (ref 70–99)
GLUCOSE BLD-MCNC: 262 MG/DL (ref 65–105)
GLUCOSE BLD-MCNC: 267 MG/DL (ref 65–105)
GLUCOSE BLD-MCNC: 268 MG/DL (ref 65–105)
HCT VFR BLD CALC: 36.2 % (ref 36.3–47.1)
HEMOGLOBIN: 11.1 G/DL (ref 11.9–15.1)
IMMATURE GRANULOCYTES: 1 %
LYMPHOCYTES # BLD: 22 % (ref 24–43)
MCH RBC QN AUTO: 30.3 PG (ref 25.2–33.5)
MCHC RBC AUTO-ENTMCNC: 30.7 G/DL (ref 28.4–34.8)
MCV RBC AUTO: 98.9 FL (ref 82.6–102.9)
MONOCYTES # BLD: 10 % (ref 3–12)
NRBC AUTOMATED: 0 PER 100 WBC
PDW BLD-RTO: 13.1 % (ref 11.8–14.4)
PLATELET # BLD: 157 K/UL (ref 138–453)
PLATELET ESTIMATE: ABNORMAL
PMV BLD AUTO: 11.4 FL (ref 8.1–13.5)
POTASSIUM SERPL-SCNC: 4.3 MMOL/L (ref 3.7–5.3)
RBC # BLD: 3.66 M/UL (ref 3.95–5.11)
RBC # BLD: ABNORMAL 10*6/UL
SEG NEUTROPHILS: 63 % (ref 36–65)
SEGMENTED NEUTROPHILS ABSOLUTE COUNT: 4.64 K/UL (ref 1.5–8.1)
SODIUM BLD-SCNC: 139 MMOL/L (ref 135–144)
TOTAL PROTEIN: 6.5 G/DL (ref 6.4–8.3)
TROPONIN INTERP: NORMAL
TROPONIN T: NORMAL NG/ML
TROPONIN, HIGH SENSITIVITY: 7 NG/L (ref 0–14)
WBC # BLD: 7.4 K/UL (ref 3.5–11.3)
WBC # BLD: ABNORMAL 10*3/UL

## 2019-11-04 PROCEDURE — 2060000000 HC ICU INTERMEDIATE R&B

## 2019-11-04 PROCEDURE — 97110 THERAPEUTIC EXERCISES: CPT

## 2019-11-04 PROCEDURE — 97535 SELF CARE MNGMENT TRAINING: CPT

## 2019-11-04 PROCEDURE — 93005 ELECTROCARDIOGRAM TRACING: CPT | Performed by: NURSE PRACTITIONER

## 2019-11-04 PROCEDURE — 6370000000 HC RX 637 (ALT 250 FOR IP): Performed by: STUDENT IN AN ORGANIZED HEALTH CARE EDUCATION/TRAINING PROGRAM

## 2019-11-04 PROCEDURE — 6370000000 HC RX 637 (ALT 250 FOR IP): Performed by: NURSE PRACTITIONER

## 2019-11-04 PROCEDURE — 2700000000 HC OXYGEN THERAPY PER DAY

## 2019-11-04 PROCEDURE — 84484 ASSAY OF TROPONIN QUANT: CPT

## 2019-11-04 PROCEDURE — 6370000000 HC RX 637 (ALT 250 FOR IP): Performed by: INTERNAL MEDICINE

## 2019-11-04 PROCEDURE — 99232 SBSQ HOSP IP/OBS MODERATE 35: CPT | Performed by: PSYCHIATRY & NEUROLOGY

## 2019-11-04 PROCEDURE — 82947 ASSAY GLUCOSE BLOOD QUANT: CPT

## 2019-11-04 PROCEDURE — 93010 ELECTROCARDIOGRAM REPORT: CPT | Performed by: INTERNAL MEDICINE

## 2019-11-04 PROCEDURE — 6360000002 HC RX W HCPCS: Performed by: NURSE PRACTITIONER

## 2019-11-04 PROCEDURE — 85025 COMPLETE CBC W/AUTO DIFF WBC: CPT

## 2019-11-04 PROCEDURE — 36415 COLL VENOUS BLD VENIPUNCTURE: CPT

## 2019-11-04 PROCEDURE — 94761 N-INVAS EAR/PLS OXIMETRY MLT: CPT

## 2019-11-04 PROCEDURE — 80053 COMPREHEN METABOLIC PANEL: CPT

## 2019-11-04 PROCEDURE — 99232 SBSQ HOSP IP/OBS MODERATE 35: CPT | Performed by: INTERNAL MEDICINE

## 2019-11-04 PROCEDURE — 97116 GAIT TRAINING THERAPY: CPT

## 2019-11-04 RX ORDER — ONDANSETRON 2 MG/ML
4 INJECTION INTRAMUSCULAR; INTRAVENOUS EVERY 6 HOURS PRN
Status: DISCONTINUED | OUTPATIENT
Start: 2019-11-04 | End: 2019-11-07 | Stop reason: HOSPADM

## 2019-11-04 RX ORDER — ACETAMINOPHEN 325 MG/1
650 TABLET ORAL EVERY 4 HOURS PRN
Status: DISCONTINUED | OUTPATIENT
Start: 2019-11-04 | End: 2019-11-07 | Stop reason: HOSPADM

## 2019-11-04 RX ORDER — INSULIN GLARGINE 100 [IU]/ML
45 INJECTION, SOLUTION SUBCUTANEOUS NIGHTLY
Status: DISCONTINUED | OUTPATIENT
Start: 2019-11-04 | End: 2019-11-06

## 2019-11-04 RX ADMIN — ONDANSETRON 4 MG: 2 INJECTION INTRAMUSCULAR; INTRAVENOUS at 20:20

## 2019-11-04 RX ADMIN — CARVEDILOL 3.12 MG: 3.12 TABLET, FILM COATED ORAL at 08:32

## 2019-11-04 RX ADMIN — GABAPENTIN 300 MG: 300 CAPSULE ORAL at 22:07

## 2019-11-04 RX ADMIN — CLOPIDOGREL 75 MG: 75 TABLET, FILM COATED ORAL at 08:32

## 2019-11-04 RX ADMIN — TOPIRAMATE 100 MG: 100 TABLET, FILM COATED ORAL at 22:07

## 2019-11-04 RX ADMIN — GABAPENTIN 300 MG: 300 CAPSULE ORAL at 13:00

## 2019-11-04 RX ADMIN — INSULIN LISPRO 6 UNITS: 100 INJECTION, SOLUTION INTRAVENOUS; SUBCUTANEOUS at 12:38

## 2019-11-04 RX ADMIN — DOCUSATE SODIUM 100 MG: 100 CAPSULE, LIQUID FILLED ORAL at 08:32

## 2019-11-04 RX ADMIN — OXYCODONE HYDROCHLORIDE AND ACETAMINOPHEN 1 TABLET: 5; 325 TABLET ORAL at 06:35

## 2019-11-04 RX ADMIN — CITALOPRAM 10 MG: 10 TABLET, FILM COATED ORAL at 08:32

## 2019-11-04 RX ADMIN — INSULIN LISPRO 6 UNITS: 100 INJECTION, SOLUTION INTRAVENOUS; SUBCUTANEOUS at 19:21

## 2019-11-04 RX ADMIN — Medication 3 MG: at 22:07

## 2019-11-04 RX ADMIN — CARVEDILOL 3.12 MG: 3.12 TABLET, FILM COATED ORAL at 19:21

## 2019-11-04 RX ADMIN — ASPIRIN 81 MG 81 MG: 81 TABLET ORAL at 08:32

## 2019-11-04 RX ADMIN — PANTOPRAZOLE SODIUM 40 MG: 40 TABLET, DELAYED RELEASE ORAL at 08:32

## 2019-11-04 RX ADMIN — PANTOPRAZOLE SODIUM 40 MG: 40 TABLET, DELAYED RELEASE ORAL at 22:07

## 2019-11-04 RX ADMIN — ATORVASTATIN CALCIUM 80 MG: 80 TABLET, FILM COATED ORAL at 22:07

## 2019-11-04 RX ADMIN — INSULIN LISPRO 2 UNITS: 100 INJECTION, SOLUTION INTRAVENOUS; SUBCUTANEOUS at 08:36

## 2019-11-04 RX ADMIN — DOCUSATE SODIUM 100 MG: 100 CAPSULE, LIQUID FILLED ORAL at 22:07

## 2019-11-04 RX ADMIN — ACETAMINOPHEN 650 MG: 325 TABLET ORAL at 05:32

## 2019-11-04 RX ADMIN — GABAPENTIN 300 MG: 300 CAPSULE ORAL at 08:32

## 2019-11-04 ASSESSMENT — PAIN SCALES - GENERAL
PAINLEVEL_OUTOF10: 5
PAINLEVEL_OUTOF10: 7
PAINLEVEL_OUTOF10: 5

## 2019-11-04 ASSESSMENT — PAIN DESCRIPTION - DIRECTION: RADIATING_TOWARDS: BACK

## 2019-11-04 ASSESSMENT — PAIN DESCRIPTION - ONSET: ONSET: AWAKENED FROM SLEEP

## 2019-11-04 ASSESSMENT — PAIN DESCRIPTION - ORIENTATION
ORIENTATION: MID
ORIENTATION: LEFT

## 2019-11-04 ASSESSMENT — PAIN DESCRIPTION - PAIN TYPE
TYPE: ACUTE PAIN
TYPE: ACUTE PAIN

## 2019-11-04 ASSESSMENT — PAIN DESCRIPTION - LOCATION
LOCATION: CHEST
LOCATION: CHEST

## 2019-11-04 ASSESSMENT — PAIN - FUNCTIONAL ASSESSMENT: PAIN_FUNCTIONAL_ASSESSMENT: ACTIVITIES ARE NOT PREVENTED

## 2019-11-04 ASSESSMENT — PAIN DESCRIPTION - DESCRIPTORS
DESCRIPTORS: ACHING
DESCRIPTORS: HEAVINESS;PRESSURE;SHOOTING

## 2019-11-05 LAB
ABSOLUTE EOS #: 0.23 K/UL (ref 0–0.44)
ABSOLUTE IMMATURE GRANULOCYTE: 0.04 K/UL (ref 0–0.3)
ABSOLUTE LYMPH #: 1.25 K/UL (ref 1.1–3.7)
ABSOLUTE MONO #: 0.77 K/UL (ref 0.1–1.2)
BASOPHILS # BLD: 1 % (ref 0–2)
BASOPHILS ABSOLUTE: 0.05 K/UL (ref 0–0.2)
DIFFERENTIAL TYPE: ABNORMAL
EOSINOPHILS RELATIVE PERCENT: 3 % (ref 1–4)
GLUCOSE BLD-MCNC: 211 MG/DL (ref 65–105)
GLUCOSE BLD-MCNC: 232 MG/DL (ref 65–105)
GLUCOSE BLD-MCNC: 282 MG/DL (ref 65–105)
GLUCOSE BLD-MCNC: 323 MG/DL (ref 65–105)
HCT VFR BLD CALC: 40.3 % (ref 36.3–47.1)
HEMOGLOBIN: 12.1 G/DL (ref 11.9–15.1)
IMMATURE GRANULOCYTES: 1 %
LYMPHOCYTES # BLD: 15 % (ref 24–43)
MCH RBC QN AUTO: 31 PG (ref 25.2–33.5)
MCHC RBC AUTO-ENTMCNC: 30 G/DL (ref 28.4–34.8)
MCV RBC AUTO: 103.3 FL (ref 82.6–102.9)
MONOCYTES # BLD: 9 % (ref 3–12)
NRBC AUTOMATED: 0 PER 100 WBC
PDW BLD-RTO: 13.2 % (ref 11.8–14.4)
PLATELET # BLD: 178 K/UL (ref 138–453)
PLATELET ESTIMATE: ABNORMAL
PMV BLD AUTO: 11.8 FL (ref 8.1–13.5)
RBC # BLD: 3.9 M/UL (ref 3.95–5.11)
RBC # BLD: ABNORMAL 10*6/UL
SEG NEUTROPHILS: 72 % (ref 36–65)
SEGMENTED NEUTROPHILS ABSOLUTE COUNT: 5.98 K/UL (ref 1.5–8.1)
WBC # BLD: 8.3 K/UL (ref 3.5–11.3)
WBC # BLD: ABNORMAL 10*3/UL

## 2019-11-05 PROCEDURE — 85025 COMPLETE CBC W/AUTO DIFF WBC: CPT

## 2019-11-05 PROCEDURE — 97110 THERAPEUTIC EXERCISES: CPT

## 2019-11-05 PROCEDURE — 94761 N-INVAS EAR/PLS OXIMETRY MLT: CPT

## 2019-11-05 PROCEDURE — 36415 COLL VENOUS BLD VENIPUNCTURE: CPT

## 2019-11-05 PROCEDURE — 97116 GAIT TRAINING THERAPY: CPT

## 2019-11-05 PROCEDURE — 99231 SBSQ HOSP IP/OBS SF/LOW 25: CPT | Performed by: INTERNAL MEDICINE

## 2019-11-05 PROCEDURE — 6370000000 HC RX 637 (ALT 250 FOR IP): Performed by: INTERNAL MEDICINE

## 2019-11-05 PROCEDURE — 2700000000 HC OXYGEN THERAPY PER DAY

## 2019-11-05 PROCEDURE — 82947 ASSAY GLUCOSE BLOOD QUANT: CPT

## 2019-11-05 PROCEDURE — 2060000000 HC ICU INTERMEDIATE R&B

## 2019-11-05 PROCEDURE — 94660 CPAP INITIATION&MGMT: CPT

## 2019-11-05 PROCEDURE — 94640 AIRWAY INHALATION TREATMENT: CPT

## 2019-11-05 PROCEDURE — 6370000000 HC RX 637 (ALT 250 FOR IP): Performed by: STUDENT IN AN ORGANIZED HEALTH CARE EDUCATION/TRAINING PROGRAM

## 2019-11-05 RX ORDER — IPRATROPIUM BROMIDE AND ALBUTEROL SULFATE 2.5; .5 MG/3ML; MG/3ML
3 SOLUTION RESPIRATORY (INHALATION) EVERY 4 HOURS PRN
Status: DISCONTINUED | OUTPATIENT
Start: 2019-11-05 | End: 2019-11-07 | Stop reason: HOSPADM

## 2019-11-05 RX ADMIN — TOPIRAMATE 100 MG: 100 TABLET, FILM COATED ORAL at 21:23

## 2019-11-05 RX ADMIN — INSULIN GLARGINE 45 UNITS: 100 INJECTION, SOLUTION SUBCUTANEOUS at 00:31

## 2019-11-05 RX ADMIN — CARVEDILOL 3.12 MG: 3.12 TABLET, FILM COATED ORAL at 09:06

## 2019-11-05 RX ADMIN — INSULIN LISPRO 4 UNITS: 100 INJECTION, SOLUTION INTRAVENOUS; SUBCUTANEOUS at 09:12

## 2019-11-05 RX ADMIN — ASPIRIN 81 MG 81 MG: 81 TABLET ORAL at 09:06

## 2019-11-05 RX ADMIN — INSULIN LISPRO 2 UNITS: 100 INJECTION, SOLUTION INTRAVENOUS; SUBCUTANEOUS at 00:30

## 2019-11-05 RX ADMIN — DOCUSATE SODIUM 100 MG: 100 CAPSULE, LIQUID FILLED ORAL at 09:06

## 2019-11-05 RX ADMIN — INSULIN LISPRO 8 UNITS: 100 INJECTION, SOLUTION INTRAVENOUS; SUBCUTANEOUS at 18:04

## 2019-11-05 RX ADMIN — INSULIN LISPRO 6 UNITS: 100 INJECTION, SOLUTION INTRAVENOUS; SUBCUTANEOUS at 13:29

## 2019-11-05 RX ADMIN — CLOPIDOGREL 75 MG: 75 TABLET, FILM COATED ORAL at 09:06

## 2019-11-05 RX ADMIN — GABAPENTIN 300 MG: 300 CAPSULE ORAL at 13:29

## 2019-11-05 RX ADMIN — CITALOPRAM 10 MG: 10 TABLET, FILM COATED ORAL at 09:06

## 2019-11-05 RX ADMIN — ATORVASTATIN CALCIUM 80 MG: 80 TABLET, FILM COATED ORAL at 21:23

## 2019-11-05 RX ADMIN — INSULIN GLARGINE 45 UNITS: 100 INJECTION, SOLUTION SUBCUTANEOUS at 21:24

## 2019-11-05 RX ADMIN — CARVEDILOL 3.12 MG: 3.12 TABLET, FILM COATED ORAL at 18:04

## 2019-11-05 RX ADMIN — DOCUSATE SODIUM 100 MG: 100 CAPSULE, LIQUID FILLED ORAL at 21:23

## 2019-11-05 RX ADMIN — PANTOPRAZOLE SODIUM 40 MG: 40 TABLET, DELAYED RELEASE ORAL at 21:23

## 2019-11-05 RX ADMIN — PANTOPRAZOLE SODIUM 40 MG: 40 TABLET, DELAYED RELEASE ORAL at 09:06

## 2019-11-05 RX ADMIN — GABAPENTIN 300 MG: 300 CAPSULE ORAL at 09:06

## 2019-11-05 RX ADMIN — GABAPENTIN 300 MG: 300 CAPSULE ORAL at 21:23

## 2019-11-05 RX ADMIN — IPRATROPIUM BROMIDE AND ALBUTEROL SULFATE 3 ML: .5; 3 SOLUTION RESPIRATORY (INHALATION) at 09:19

## 2019-11-05 RX ADMIN — INSULIN LISPRO 2 UNITS: 100 INJECTION, SOLUTION INTRAVENOUS; SUBCUTANEOUS at 21:23

## 2019-11-05 ASSESSMENT — PAIN DESCRIPTION - FREQUENCY: FREQUENCY: INTERMITTENT

## 2019-11-05 ASSESSMENT — PAIN - FUNCTIONAL ASSESSMENT: PAIN_FUNCTIONAL_ASSESSMENT: ACTIVITIES ARE NOT PREVENTED

## 2019-11-05 ASSESSMENT — PAIN DESCRIPTION - DESCRIPTORS: DESCRIPTORS: ACHING

## 2019-11-05 ASSESSMENT — PAIN DESCRIPTION - PAIN TYPE: TYPE: ACUTE PAIN

## 2019-11-05 ASSESSMENT — PAIN SCALES - GENERAL
PAINLEVEL_OUTOF10: 8
PAINLEVEL_OUTOF10: 0

## 2019-11-05 ASSESSMENT — PAIN DESCRIPTION - ORIENTATION: ORIENTATION: RIGHT

## 2019-11-05 ASSESSMENT — PAIN DESCRIPTION - LOCATION: LOCATION: FLANK

## 2019-11-06 LAB
GLUCOSE BLD-MCNC: 133 MG/DL (ref 65–105)
GLUCOSE BLD-MCNC: 169 MG/DL (ref 65–105)
GLUCOSE BLD-MCNC: 242 MG/DL (ref 65–105)
GLUCOSE BLD-MCNC: 250 MG/DL (ref 65–105)
GLUCOSE BLD-MCNC: 261 MG/DL (ref 65–105)

## 2019-11-06 PROCEDURE — 6370000000 HC RX 637 (ALT 250 FOR IP): Performed by: INTERNAL MEDICINE

## 2019-11-06 PROCEDURE — 97535 SELF CARE MNGMENT TRAINING: CPT

## 2019-11-06 PROCEDURE — 6370000000 HC RX 637 (ALT 250 FOR IP): Performed by: NURSE PRACTITIONER

## 2019-11-06 PROCEDURE — 94660 CPAP INITIATION&MGMT: CPT

## 2019-11-06 PROCEDURE — 97116 GAIT TRAINING THERAPY: CPT

## 2019-11-06 PROCEDURE — 97530 THERAPEUTIC ACTIVITIES: CPT

## 2019-11-06 PROCEDURE — 2060000000 HC ICU INTERMEDIATE R&B

## 2019-11-06 PROCEDURE — 6370000000 HC RX 637 (ALT 250 FOR IP): Performed by: STUDENT IN AN ORGANIZED HEALTH CARE EDUCATION/TRAINING PROGRAM

## 2019-11-06 PROCEDURE — 99232 SBSQ HOSP IP/OBS MODERATE 35: CPT | Performed by: INTERNAL MEDICINE

## 2019-11-06 RX ORDER — INSULIN GLARGINE 100 [IU]/ML
55 INJECTION, SOLUTION SUBCUTANEOUS NIGHTLY
Status: DISCONTINUED | OUTPATIENT
Start: 2019-11-06 | End: 2019-11-07 | Stop reason: HOSPADM

## 2019-11-06 RX ADMIN — GABAPENTIN 300 MG: 300 CAPSULE ORAL at 14:20

## 2019-11-06 RX ADMIN — DOCUSATE SODIUM 100 MG: 100 CAPSULE, LIQUID FILLED ORAL at 09:18

## 2019-11-06 RX ADMIN — CARVEDILOL 3.12 MG: 3.12 TABLET, FILM COATED ORAL at 09:18

## 2019-11-06 RX ADMIN — TOPIRAMATE 100 MG: 100 TABLET, FILM COATED ORAL at 20:29

## 2019-11-06 RX ADMIN — INSULIN GLARGINE 55 UNITS: 100 INJECTION, SOLUTION SUBCUTANEOUS at 22:42

## 2019-11-06 RX ADMIN — INSULIN LISPRO 6 UNITS: 100 INJECTION, SOLUTION INTRAVENOUS; SUBCUTANEOUS at 14:19

## 2019-11-06 RX ADMIN — OXYCODONE HYDROCHLORIDE AND ACETAMINOPHEN 2 TABLET: 5; 325 TABLET ORAL at 22:47

## 2019-11-06 RX ADMIN — PANTOPRAZOLE SODIUM 40 MG: 40 TABLET, DELAYED RELEASE ORAL at 20:29

## 2019-11-06 RX ADMIN — CLOPIDOGREL 75 MG: 75 TABLET, FILM COATED ORAL at 09:18

## 2019-11-06 RX ADMIN — ASPIRIN 81 MG 81 MG: 81 TABLET ORAL at 09:18

## 2019-11-06 RX ADMIN — CITALOPRAM 10 MG: 10 TABLET, FILM COATED ORAL at 09:18

## 2019-11-06 RX ADMIN — PANTOPRAZOLE SODIUM 40 MG: 40 TABLET, DELAYED RELEASE ORAL at 09:18

## 2019-11-06 RX ADMIN — GABAPENTIN 300 MG: 300 CAPSULE ORAL at 09:18

## 2019-11-06 RX ADMIN — INSULIN LISPRO 1 UNITS: 100 INJECTION, SOLUTION INTRAVENOUS; SUBCUTANEOUS at 22:41

## 2019-11-06 RX ADMIN — INSULIN LISPRO 6 UNITS: 100 INJECTION, SOLUTION INTRAVENOUS; SUBCUTANEOUS at 17:15

## 2019-11-06 RX ADMIN — ATORVASTATIN CALCIUM 80 MG: 80 TABLET, FILM COATED ORAL at 20:29

## 2019-11-06 RX ADMIN — CARVEDILOL 3.12 MG: 3.12 TABLET, FILM COATED ORAL at 17:15

## 2019-11-06 RX ADMIN — DOCUSATE SODIUM 100 MG: 100 CAPSULE, LIQUID FILLED ORAL at 20:29

## 2019-11-06 RX ADMIN — ACETAMINOPHEN 650 MG: 325 TABLET ORAL at 09:19

## 2019-11-06 RX ADMIN — GABAPENTIN 300 MG: 300 CAPSULE ORAL at 20:29

## 2019-11-06 ASSESSMENT — PAIN DESCRIPTION - LOCATION: LOCATION: HIP;LEG

## 2019-11-06 ASSESSMENT — PAIN SCALES - GENERAL
PAINLEVEL_OUTOF10: 8
PAINLEVEL_OUTOF10: 9
PAINLEVEL_OUTOF10: 3

## 2019-11-06 ASSESSMENT — PAIN DESCRIPTION - PAIN TYPE: TYPE: CHRONIC PAIN

## 2019-11-07 VITALS
HEART RATE: 81 BPM | BODY MASS INDEX: 46.01 KG/M2 | RESPIRATION RATE: 15 BRPM | OXYGEN SATURATION: 98 % | HEIGHT: 62 IN | TEMPERATURE: 98.5 F | DIASTOLIC BLOOD PRESSURE: 66 MMHG | SYSTOLIC BLOOD PRESSURE: 148 MMHG | WEIGHT: 250 LBS

## 2019-11-07 LAB
GLUCOSE BLD-MCNC: 121 MG/DL (ref 65–105)
GLUCOSE BLD-MCNC: 195 MG/DL (ref 65–105)
GLUCOSE BLD-MCNC: 256 MG/DL (ref 65–105)

## 2019-11-07 PROCEDURE — 99239 HOSP IP/OBS DSCHRG MGMT >30: CPT | Performed by: INTERNAL MEDICINE

## 2019-11-07 PROCEDURE — 94660 CPAP INITIATION&MGMT: CPT

## 2019-11-07 PROCEDURE — 97535 SELF CARE MNGMENT TRAINING: CPT

## 2019-11-07 PROCEDURE — 6370000000 HC RX 637 (ALT 250 FOR IP): Performed by: INTERNAL MEDICINE

## 2019-11-07 PROCEDURE — 6370000000 HC RX 637 (ALT 250 FOR IP): Performed by: NURSE PRACTITIONER

## 2019-11-07 PROCEDURE — 94761 N-INVAS EAR/PLS OXIMETRY MLT: CPT

## 2019-11-07 PROCEDURE — 2700000000 HC OXYGEN THERAPY PER DAY

## 2019-11-07 PROCEDURE — 82947 ASSAY GLUCOSE BLOOD QUANT: CPT

## 2019-11-07 PROCEDURE — 97116 GAIT TRAINING THERAPY: CPT

## 2019-11-07 PROCEDURE — 97110 THERAPEUTIC EXERCISES: CPT

## 2019-11-07 RX ORDER — INSULIN GLARGINE 100 [IU]/ML
55 INJECTION, SOLUTION SUBCUTANEOUS NIGHTLY
Qty: 1 VIAL | Refills: 3 | DISCHARGE
Start: 2019-11-07 | End: 2019-12-03 | Stop reason: SDUPTHER

## 2019-11-07 RX ORDER — OXYCODONE HYDROCHLORIDE AND ACETAMINOPHEN 5; 325 MG/1; MG/1
1 TABLET ORAL EVERY 8 HOURS PRN
Qty: 15 TABLET | Refills: 0 | Status: SHIPPED | OUTPATIENT
Start: 2019-11-07 | End: 2019-11-12

## 2019-11-07 RX ADMIN — GABAPENTIN 300 MG: 300 CAPSULE ORAL at 09:04

## 2019-11-07 RX ADMIN — ASPIRIN 81 MG 81 MG: 81 TABLET ORAL at 09:03

## 2019-11-07 RX ADMIN — CARVEDILOL 3.12 MG: 3.12 TABLET, FILM COATED ORAL at 17:46

## 2019-11-07 RX ADMIN — INSULIN LISPRO 2 UNITS: 100 INJECTION, SOLUTION INTRAVENOUS; SUBCUTANEOUS at 17:44

## 2019-11-07 RX ADMIN — OXYCODONE HYDROCHLORIDE AND ACETAMINOPHEN 1 TABLET: 5; 325 TABLET ORAL at 08:54

## 2019-11-07 RX ADMIN — GABAPENTIN 300 MG: 300 CAPSULE ORAL at 13:05

## 2019-11-07 RX ADMIN — INSULIN LISPRO 6 UNITS: 100 INJECTION, SOLUTION INTRAVENOUS; SUBCUTANEOUS at 13:06

## 2019-11-07 RX ADMIN — PANTOPRAZOLE SODIUM 40 MG: 40 TABLET, DELAYED RELEASE ORAL at 09:03

## 2019-11-07 RX ADMIN — DOCUSATE SODIUM 100 MG: 100 CAPSULE, LIQUID FILLED ORAL at 09:04

## 2019-11-07 RX ADMIN — CLOPIDOGREL 75 MG: 75 TABLET, FILM COATED ORAL at 09:04

## 2019-11-07 RX ADMIN — OXYCODONE HYDROCHLORIDE AND ACETAMINOPHEN 1 TABLET: 5; 325 TABLET ORAL at 13:06

## 2019-11-07 RX ADMIN — CITALOPRAM 10 MG: 10 TABLET, FILM COATED ORAL at 09:03

## 2019-11-07 RX ADMIN — CARVEDILOL 3.12 MG: 3.12 TABLET, FILM COATED ORAL at 09:03

## 2019-11-07 ASSESSMENT — PAIN DESCRIPTION - LOCATION
LOCATION: HIP;LEG
LOCATION: HIP;LEG

## 2019-11-07 ASSESSMENT — PAIN DESCRIPTION - ORIENTATION
ORIENTATION: RIGHT
ORIENTATION: RIGHT

## 2019-11-07 ASSESSMENT — PAIN SCALES - GENERAL
PAINLEVEL_OUTOF10: 8
PAINLEVEL_OUTOF10: 6
PAINLEVEL_OUTOF10: 8
PAINLEVEL_OUTOF10: 6

## 2019-11-07 ASSESSMENT — PAIN - FUNCTIONAL ASSESSMENT: PAIN_FUNCTIONAL_ASSESSMENT: ACTIVITIES ARE NOT PREVENTED

## 2019-11-07 ASSESSMENT — PAIN DESCRIPTION - DIRECTION: RADIATING_TOWARDS: BACK

## 2019-11-07 ASSESSMENT — PAIN DESCRIPTION - DESCRIPTORS: DESCRIPTORS: ACHING

## 2019-11-07 ASSESSMENT — PAIN DESCRIPTION - PROGRESSION: CLINICAL_PROGRESSION: NOT CHANGED

## 2019-11-07 ASSESSMENT — PAIN DESCRIPTION - PAIN TYPE
TYPE: CHRONIC PAIN
TYPE: CHRONIC PAIN

## 2019-11-07 ASSESSMENT — PAIN DESCRIPTION - FREQUENCY: FREQUENCY: INTERMITTENT

## 2019-11-11 ENCOUNTER — TELEPHONE (OUTPATIENT)
Dept: FAMILY MEDICINE CLINIC | Age: 69
End: 2019-11-11

## 2019-11-11 DIAGNOSIS — M47.892 OTHER OSTEOARTHRITIS OF SPINE, CERVICAL REGION: Primary | Chronic | ICD-10-CM

## 2019-11-11 DIAGNOSIS — M47.817 LUMBOSACRAL SPONDYLOSIS WITHOUT MYELOPATHY: Chronic | ICD-10-CM

## 2019-11-11 RX ORDER — LIDOCAINE 40 MG/G
CREAM TOPICAL
Qty: 45 G | Refills: 3 | Status: SHIPPED | OUTPATIENT
Start: 2019-11-11 | End: 2022-07-25

## 2019-11-13 ENCOUNTER — TELEPHONE (OUTPATIENT)
Dept: PAIN MANAGEMENT | Age: 69
End: 2019-11-13

## 2019-12-03 ENCOUNTER — OFFICE VISIT (OUTPATIENT)
Dept: FAMILY MEDICINE CLINIC | Age: 69
End: 2019-12-03
Payer: COMMERCIAL

## 2019-12-03 VITALS
OXYGEN SATURATION: 95 % | HEIGHT: 62 IN | DIASTOLIC BLOOD PRESSURE: 86 MMHG | BODY MASS INDEX: 47.18 KG/M2 | WEIGHT: 256.4 LBS | HEART RATE: 78 BPM | SYSTOLIC BLOOD PRESSURE: 136 MMHG

## 2019-12-03 DIAGNOSIS — M47.817 LUMBOSACRAL SPONDYLOSIS WITHOUT MYELOPATHY: Chronic | ICD-10-CM

## 2019-12-03 DIAGNOSIS — E11.42 TYPE 2 DIABETES MELLITUS WITH DIABETIC POLYNEUROPATHY, WITH LONG-TERM CURRENT USE OF INSULIN (HCC): Chronic | ICD-10-CM

## 2019-12-03 DIAGNOSIS — E78.2 MIXED HYPERLIPIDEMIA: Chronic | ICD-10-CM

## 2019-12-03 DIAGNOSIS — N18.30 STAGE 3 CHRONIC KIDNEY DISEASE (HCC): ICD-10-CM

## 2019-12-03 DIAGNOSIS — G45.9 TIA (TRANSIENT ISCHEMIC ATTACK): Primary | ICD-10-CM

## 2019-12-03 DIAGNOSIS — Z79.4 TYPE 2 DIABETES MELLITUS WITH DIABETIC POLYNEUROPATHY, WITH LONG-TERM CURRENT USE OF INSULIN (HCC): Chronic | ICD-10-CM

## 2019-12-03 PROBLEM — R07.9 CHEST PAIN: Status: RESOLVED | Noted: 2019-06-14 | Resolved: 2019-12-03

## 2019-12-03 PROCEDURE — 99214 OFFICE O/P EST MOD 30 MIN: CPT | Performed by: FAMILY MEDICINE

## 2019-12-03 PROCEDURE — 1123F ACP DISCUSS/DSCN MKR DOCD: CPT | Performed by: FAMILY MEDICINE

## 2019-12-03 PROCEDURE — G8427 DOCREV CUR MEDS BY ELIG CLIN: HCPCS | Performed by: FAMILY MEDICINE

## 2019-12-03 PROCEDURE — G8482 FLU IMMUNIZE ORDER/ADMIN: HCPCS | Performed by: FAMILY MEDICINE

## 2019-12-03 PROCEDURE — 1090F PRES/ABSN URINE INCON ASSESS: CPT | Performed by: FAMILY MEDICINE

## 2019-12-03 PROCEDURE — 1111F DSCHRG MED/CURRENT MED MERGE: CPT | Performed by: FAMILY MEDICINE

## 2019-12-03 PROCEDURE — 2022F DILAT RTA XM EVC RTNOPTHY: CPT | Performed by: FAMILY MEDICINE

## 2019-12-03 PROCEDURE — G8417 CALC BMI ABV UP PARAM F/U: HCPCS | Performed by: FAMILY MEDICINE

## 2019-12-03 PROCEDURE — G8399 PT W/DXA RESULTS DOCUMENT: HCPCS | Performed by: FAMILY MEDICINE

## 2019-12-03 PROCEDURE — 1036F TOBACCO NON-USER: CPT | Performed by: FAMILY MEDICINE

## 2019-12-03 PROCEDURE — 4040F PNEUMOC VAC/ADMIN/RCVD: CPT | Performed by: FAMILY MEDICINE

## 2019-12-03 PROCEDURE — G8598 ASA/ANTIPLAT THER USED: HCPCS | Performed by: FAMILY MEDICINE

## 2019-12-03 PROCEDURE — 3017F COLORECTAL CA SCREEN DOC REV: CPT | Performed by: FAMILY MEDICINE

## 2019-12-03 PROCEDURE — 3052F HG A1C>EQUAL 8.0%<EQUAL 9.0%: CPT | Performed by: FAMILY MEDICINE

## 2019-12-03 RX ORDER — ATORVASTATIN CALCIUM 80 MG/1
80 TABLET, FILM COATED ORAL DAILY
Qty: 90 TABLET | Refills: 3 | Status: SHIPPED | OUTPATIENT
Start: 2019-12-03 | End: 2020-10-01

## 2019-12-03 RX ORDER — INSULIN GLARGINE 100 [IU]/ML
INJECTION, SOLUTION SUBCUTANEOUS
Qty: 5 VIAL | Refills: 3 | Status: SHIPPED | OUTPATIENT
Start: 2019-12-03 | End: 2020-02-12 | Stop reason: SDUPTHER

## 2019-12-03 RX ORDER — ASPIRIN 81 MG/1
81 TABLET, CHEWABLE ORAL DAILY
Qty: 90 TABLET | Refills: 3 | Status: SHIPPED | OUTPATIENT
Start: 2019-12-03 | End: 2021-10-05

## 2019-12-03 RX ORDER — INSULIN GLARGINE 100 [IU]/ML
55 INJECTION, SOLUTION SUBCUTANEOUS NIGHTLY
Qty: 5 VIAL | Refills: 3 | Status: SHIPPED | OUTPATIENT
Start: 2019-12-03 | End: 2019-12-03

## 2019-12-03 ASSESSMENT — ENCOUNTER SYMPTOMS
SINUS PRESSURE: 0
DIARRHEA: 0
PHOTOPHOBIA: 0
CONSTIPATION: 0
SINUS PAIN: 0
CHEST TIGHTNESS: 0
SHORTNESS OF BREATH: 0
ABDOMINAL PAIN: 0
COUGH: 0
BACK PAIN: 1
ABDOMINAL DISTENTION: 0

## 2019-12-04 ENCOUNTER — HOSPITAL ENCOUNTER (OUTPATIENT)
Dept: PAIN MANAGEMENT | Age: 69
Discharge: HOME | End: 2019-12-04
Payer: COMMERCIAL

## 2019-12-04 VITALS
OXYGEN SATURATION: 92 % | DIASTOLIC BLOOD PRESSURE: 74 MMHG | HEART RATE: 73 BPM | SYSTOLIC BLOOD PRESSURE: 140 MMHG | RESPIRATION RATE: 16 BRPM

## 2019-12-04 DIAGNOSIS — M47.817 LUMBOSACRAL SPONDYLOSIS WITHOUT MYELOPATHY: Chronic | ICD-10-CM

## 2019-12-04 DIAGNOSIS — M50.30 DDD (DEGENERATIVE DISC DISEASE), CERVICAL: ICD-10-CM

## 2019-12-04 DIAGNOSIS — M46.1 SACROILIITIS, NOT ELSEWHERE CLASSIFIED (HCC): ICD-10-CM

## 2019-12-04 DIAGNOSIS — M54.16 LUMBAR RADICULOPATHY, CHRONIC: ICD-10-CM

## 2019-12-04 DIAGNOSIS — M51.36 DDD (DEGENERATIVE DISC DISEASE), LUMBAR: Primary | ICD-10-CM

## 2019-12-04 DIAGNOSIS — Z51.81 MEDICATION MONITORING ENCOUNTER: Chronic | ICD-10-CM

## 2019-12-04 PROCEDURE — 99213 OFFICE O/P EST LOW 20 MIN: CPT

## 2019-12-04 PROCEDURE — 99213 OFFICE O/P EST LOW 20 MIN: CPT | Performed by: NURSE PRACTITIONER

## 2019-12-04 RX ORDER — OXYCODONE HYDROCHLORIDE AND ACETAMINOPHEN 5; 325 MG/1; MG/1
1 TABLET ORAL SEE ADMIN INSTRUCTIONS
Qty: 100 TABLET | Refills: 0 | Status: SHIPPED | OUTPATIENT
Start: 2019-12-04 | End: 2020-01-03 | Stop reason: SDUPTHER

## 2019-12-04 RX ORDER — GABAPENTIN 300 MG/1
300 CAPSULE ORAL 3 TIMES DAILY
Qty: 90 CAPSULE | Refills: 0 | Status: SHIPPED | OUTPATIENT
Start: 2019-12-04 | End: 2020-01-03 | Stop reason: SDUPTHER

## 2019-12-04 ASSESSMENT — ENCOUNTER SYMPTOMS
COUGH: 0
BACK PAIN: 1
CONSTIPATION: 0
SHORTNESS OF BREATH: 0

## 2020-01-03 RX ORDER — OXYCODONE HYDROCHLORIDE AND ACETAMINOPHEN 5; 325 MG/1; MG/1
1 TABLET ORAL SEE ADMIN INSTRUCTIONS
Qty: 10 TABLET | Refills: 0 | Status: SHIPPED | OUTPATIENT
Start: 2020-01-03 | End: 2020-01-06

## 2020-01-03 RX ORDER — GABAPENTIN 300 MG/1
300 CAPSULE ORAL 3 TIMES DAILY
Qty: 90 CAPSULE | Refills: 0 | Status: SHIPPED | OUTPATIENT
Start: 2020-01-03 | End: 2020-03-02 | Stop reason: SDUPTHER

## 2020-01-06 ENCOUNTER — HOSPITAL ENCOUNTER (OUTPATIENT)
Dept: PAIN MANAGEMENT | Age: 70
Discharge: HOME | End: 2020-01-06
Payer: COMMERCIAL

## 2020-01-06 VITALS
OXYGEN SATURATION: 93 % | HEART RATE: 65 BPM | SYSTOLIC BLOOD PRESSURE: 119 MMHG | RESPIRATION RATE: 16 BRPM | DIASTOLIC BLOOD PRESSURE: 60 MMHG

## 2020-01-06 PROCEDURE — 99213 OFFICE O/P EST LOW 20 MIN: CPT

## 2020-01-06 PROCEDURE — 80307 DRUG TEST PRSMV CHEM ANLYZR: CPT

## 2020-01-06 PROCEDURE — 99214 OFFICE O/P EST MOD 30 MIN: CPT | Performed by: NURSE PRACTITIONER

## 2020-01-06 RX ORDER — OXYCODONE HYDROCHLORIDE AND ACETAMINOPHEN 5; 325 MG/1; MG/1
1 TABLET ORAL SEE ADMIN INSTRUCTIONS
Qty: 100 TABLET | Refills: 0 | Status: SHIPPED | OUTPATIENT
Start: 2020-01-06 | End: 2020-01-31 | Stop reason: SDUPTHER

## 2020-01-06 ASSESSMENT — ENCOUNTER SYMPTOMS
BACK PAIN: 1
COUGH: 0
SHORTNESS OF BREATH: 0
CONSTIPATION: 0

## 2020-01-06 NOTE — PROGRESS NOTES
Patient is here today to review medication contract. Chief Complaint: back pain    PMH: Pt c/o low back pain that radiates down left leg. She has never had a lumbar surgery. She is dependant on her wheelchair to get around - can walk very short distances. She had LESI  9/13/19 with mild relief. She had a TIA on 10/31/19 and was in rehab facility for a few weeks but is now home and continues with PT in her home. She is currently trying to get an aide to come to her home - has not had one in two months. Back Pain   This is a chronic problem. The current episode started more than 1 year ago. The problem occurs constantly. The problem is unchanged. The pain is present in the lumbar spine. The quality of the pain is described as aching. Radiates to: down right leg to the foot. The pain is at a severity of 6/10. The pain is moderate. The symptoms are aggravated by position and sitting. Pertinent negatives include no chest pain or fever. She has tried analgesics and bed rest for the symptoms. The treatment provided mild relief. Patient denies any new neurological symptoms. No bowel or bladder incontinence, no weakness, and no falling. Pill count: appropriate    Morphine equivalent: 25    Periodic Controlled Substance Monitoring: Possible medication side effects, risk of tolerance/dependence & alternative treatments discussed., No signs of potential drug abuse or diversion identified. , Assessed functional status., Obtaining appropriate analgesic effect of treatment. , Random urine drug screen sent today. VERONIKA Shirley - CNP)      Past Medical History:   Diagnosis Date    Allergic rhinitis     Anxiety 7/17/2013    Arthritis     Asthma     Atrial fibrillation (HCC)     Back pain     NERVE/DR. GRACIA    Benign hypertension with CKD (chronic kidney disease) stage III (HCC)     CAD (coronary artery disease) 3/21/2013    Caffeine use     2 coffee/day    [facility] failure) [facility]     Chronic kidney disease     CKD (chronic kidney disease) stage 3, GFR 30-59 ml/min (HCC)     COPD (chronic obstructive pulmonary disease) (HCC)     emphysema    Degeneration of lumbar or lumbosacral intervertebral disc     Depression     DM (diabetes mellitus) (HCC)     Emphysema of lung (HCC)     Gastritis     GERD (gastroesophageal reflux disease)     Hearing loss     Hematuria     Hiatal hernia     HTN (hypertension), benign 6/28/2014    Hypertriglyceridemia     Incontinence of urine 9/25/2013    Knee arthropathy     Lumbosacral spondylosis without myelopathy 9/29/2014    Migraine headache     DR. BASIL [place]     Neuropathy     Obesity     On home oxygen therapy     2 L PER NC  HS AND PRN    HIGINIO on CPAP     Otitis media 1-26-15    Secondary diabetes mellitus with stage 3 chronic kidney disease (GFR 30-59) (HCC)     Stroke (HCC)     QUESTIONABLE    Tinnitus     Type 2 diabetes mellitus without complication ([facility]     Type II or unspecified type diabetes mellitus without mention of complication, not stated as uncontrolled     UTI (lower urinary tract infection)     Varicose vein        Past Surgical History:   Procedure Laterality Date    ABLATION OF DYSRHYTHMIC FOCUS  2000    CARPAL TUNNEL RELEASE Right     CATARACT REMOVAL WITH IMPLANT Bilateral     CHOLECYSTECTOMY  2007    COLONOSCOPY      COLONOSCOPY  04/10/2017    tubular adenomo polyp , mod. sigmoid diverticulosis, min. int. hemorrhoids    CYSTOSCOPY  9/25/2013    DILATION AND CURETTAGE  1979    EYE SURGERY      laser    INCONTINENCE SURGERY      Percutaneous nerve stimulation Dr Jean Carlos Starks Nov 2013     INSERTABLE CARDIAC MONITOR  12/04/2015    [employer] REVEAL LINQ MODEL #[id number] MRI CONDTIONAL OK 3T.  IMMEDIATELY POST IMPLANT    OTHER SURGICAL HISTORY  09/10/15    removal of interstim    FL COLSC FLX W/REMOVAL LESION BY HOT BX FORCEPS N/A 4/10/2017    COLONOSCOPY POLYPECTOMY HOT BIOPSY performed by Ksenia MD Lopez at [facility]      TYMPANOPLASTY      TYMPANOPLASTY      TYMPANOSTOMY TUBE PLACEMENT  08/21/2017    UPPER GASTROINTESTINAL ENDOSCOPY  03/2016    Dr Frederick [facility]       Allergies   Allergen Reactions    Robitussin [Guaifenesin] Anaphylaxis    Codeine Swelling    Compazine [Prochlorperazine Maleate] Hives and Swelling    Iodides Itching    Morphine Other (See Comments)     hallucinations    Moxifloxacin      Other reaction(s): Intolerance-unknown  Other reaction(s): Intolerance-unknown    Reglan [Metoclopramide] Nausea Only    Avelox [Moxifloxacin Hcl In Nacl] Rash     Dermatitis      Cipro Xr Rash     dermatitis    Sulfa Antibiotics Rash         Current Outpatient Medications:     oxyCODONE-acetaminophen (PERCOCET) 5-325 MG per tablet, Take 1 tablet by mouth See Admin Instructions for 3 days. Intended supply: 30 days. 1 tab 3-4 times a day prn, Disp: 10 tablet, Rfl: 0    gabapentin (NEURONTIN) 300 MG capsule, Take 1 capsule by mouth 3 times daily for 30 days. , Disp: 90 capsule, Rfl: 0    aspirin 81 MG chewable tablet, Take 1 tablet by mouth daily, Disp: 90 tablet, Rfl: 3    atorvastatin (LIPITOR) 80 MG tablet, Take 1 tablet by mouth daily, Disp: 90 tablet, Rfl: 3    insulin glargine (LANTUS) 100 UNIT/ML injection vial, Inject 40 U bid, Disp: 5 vial, Rfl: 3    lidocaine (LMX) 4 % cream, Apply topically every 8 hrs as needed for pain, Disp: 45 g, Rfl: 3    clopidogrel (PLAVIX) 75 MG tablet, TAKE 1 TAB BY MOUTH ONCE A DAY ( AM ) -THIS MEDICINE MAY BE TAKENWITH OR WITHOUT FOOD, Disp: 90 tablet, Rfl: 0    naloxone 4 MG/0.1ML LIQD nasal spray, 1 spray by Nasal route as needed for Opioid Reversal, Disp: 1 each, Rfl: 5    Lift Chair MISC, by Does not apply route, Disp: 1 each, Rfl: 0    Misc.  Devices (ADJUST BATH/SHOWER SEAT/BACK) MISC, Use daily for shower, Disp: 1 each, Rfl: 0    naloxone 4 MG/0.1ML LIQD nasal spray, 1 spray by Nasal route as needed for Opioid Reversal, Disp: 1 each, Rfl: 5    ranitidine (ZANTAC) 150 MG tablet, TAKE 1 TAB BY MOUTH TWICE A DAY ( AM AND BEDTIME ), Disp: 180 tablet, Rfl: 0    ferrous sulfate 325 (65 Fe) MG tablet, TAKE 1 TAB BY MOUTH EVERY MORNING, Disp: 90 tablet, Rfl: 5    magnesium oxide (MAG-OX) 400 MG tablet, TAKE 1 TAB BY MOUTH TWICE A DAY ( AM AND BEDTIME ), Disp: 180 tablet, Rfl: 3    carvedilol (COREG) 3.125 MG tablet, TAKE 1 TAB BY MOUTH TWICE A DAY ( AM AND BEDTIME ), Disp: 180 tablet, Rfl: 3    metFORMIN (GLUCOPHAGE) 1000 MG tablet, TAKE 1 TAB BY MOUTH TWICE A DAY ( AM AND BEDTIME ), Disp: 180 tablet, Rfl: 3    ULTICARE MINI PEN NEEDLES 31G X 6 MM MISC, USE AS DIRECTED, Disp: 100 each, Rfl: 5    topiramate (TOPAMAX) 100 MG tablet, Take 1 tablet by mouth nightly, Disp: 90 tablet, Rfl: 1    insulin aspart (NOVOLOG FLEXPEN) 100 UNIT/ML injection pen, If <139 - No Insulin; 140-199-2 Units;200-249-4 Units;250-299-6 Units;300-349-8 Units;350-400=10 Units; Above 400-12 Units, Disp: 5 pen, Rfl: 3    citalopram (CELEXA) 40 MG tablet, TAKE 1/2  TAB BY MOUTH TWICE  A DAY, Disp: 90 tablet, Rfl: 3    pantoprazole (PROTONIX) 40 MG tablet, Take 1 tablet by mouth 2 times daily, Disp: 60 tablet, Rfl: 3    TRUE METRIX BLOOD GLUCOSE TEST strip, THREE TIMES A DAY, Disp: 1 each, Rfl: 3    Alcohol Swabs (B-D SINGLE USE SWABS REGULAR) PADS, THREE TIMES A DAY, Disp: 100 each, Rfl: 0    nystatin (MYCOSTATIN) 519510 UNIT/GM powder, Apply 3 times daily. , Disp: 3 Bottle, Rfl: 3    furosemide (LASIX) 20 MG tablet, Take 1 tablet by mouth daily as needed (weight gain 3 lbs overnight), Disp: 30 tablet, Rfl: 3    losartan (COZAAR) 25 MG tablet, TAKE 1 TAB BY MOUTH ONCE A DAY, Disp: 90 tablet, Rfl: 5    Lift Chair MISC, by Does not apply route Use daily at home to help with ADL's, Disp: 1 each, Rfl: 0    nitroGLYCERIN (NITROSTAT) 0.4 MG SL tablet, 1 under the tongue as needed for angina, may repeat q5mins for up three doses, Disp: , Rfl:   Blood Glucose Monitoring Suppl HARMEET, Use daily to check BS, Disp: 1 Device, Rfl: 0    ipratropium-albuterol (DUONEB) 0.5-2.5 (3) MG/3ML SOLN nebulizer solution, Inhale 3 mLs into the lungs every 4 hours, Disp: 360 mL, Rfl: 2    Melatonin 10 MG TABS, Take 10 mg by mouth nightly, Disp: 90 tablet, Rfl: 3    Compression Stockings MISC, by Does not apply route Pressure between 20 - 25 ., Disp: 1 each, Rfl: 0    docusate sodium (COLACE) 100 MG capsule, Take 1 capsule by mouth 2 times daily Please use while taking Percocet, Disp: 30 capsule, Rfl: 0    SYMBICORT 160-4.5 MCG/ACT AERO,  2 puffs As needed for sob, Disp: , Rfl:     OXYGEN, Inhale 3 L into the lungs , Disp: , Rfl:     Family History   Problem Relation Age of Onset    Diabetes Mother     Heart Disease Mother     Stomach Cancer Father     Diabetes Maternal Grandmother         also aunts and uncles (maternal)    Emphysema Sister        Social History     Socioeconomic History    Marital status: Legally      Spouse name: Not on file    Number of children: Not on file    Years of education: Not on file    Highest education level: Not on file   Occupational History    Occupation: disabled     Employer: N/A   Social Needs    Financial resource strain: Not on file    Food insecurity:     Worry: Not on file     Inability: Not on file    Transportation needs:     Medical: Not on file     Non-medical: Not on file   Tobacco Use    Smoking status: Former Smoker     Packs/day: 3.00     Years: 41.00     Pack years: 123.00     Types: Cigarettes     Last attempt to quit: 2000     Years since quittin.0    Smokeless tobacco: Never Used    Tobacco comment: quit in    Substance and Sexual Activity    Alcohol use: No     Alcohol/week: 0.0 standard drinks    Drug use: No    Sexual activity: Not Currently   Lifestyle    Physical activity:     Days per week: Not on file     Minutes per session: Not on file    Stress: Not on file Relationships    Social connections:     Talks on phone: Not on file     Gets together: Not on file     Attends [employer] service: Not on file     Active member of club or organization: Not on file     Attends meetings of clubs or organizations: Not on file     Relationship status: Not on file    Intimate partner violence:     Fear of current or ex partner: Not on file     Emotionally abused: Not on file     Physically abused: Not on file     Forced sexual activity: Not on file   Other Topics Concern    Not on file   Social History Narrative    Not on file       Review of Systems:  Review of Systems   Constitution: Negative for chills and fever. Cardiovascular: Negative for chest pain and palpitations. Respiratory: Negative for cough and shortness of breath. Musculoskeletal: Positive for back pain. Gastrointestinal: Negative for constipation. Neurological: Negative for disturbances in coordination and loss of balance. Physical Exam:  /60   Pulse 65   Resp 16   LMP  (LMP Unknown)   SpO2 93%     Physical Exam  HENT:      Head: Normocephalic. Neck:      Musculoskeletal: Normal range of motion. Pulmonary:      Effort: Pulmonary effort is normal.   Musculoskeletal: Normal range of motion. Lumbar back: She exhibits tenderness and pain. Skin:     General: Skin is warm and dry. Neurological:      Mental Status: She is alert and oriented to person, place, and time.          Record/Diagnostics Review:    Last greg 7/2019 and was appropriate     Assessment:  Problem List Items Addressed This Visit     DDD (degenerative disc disease), cervical - Primary (Chronic)    Relevant Medications    oxyCODONE-acetaminophen (PERCOCET) 5-325 MG per tablet    Medication monitoring encounter (Chronic)    Relevant Orders    Pain Management Drug Screen    Lumbosacral spondylosis without myelopathy (Chronic)    Relevant Medications    oxyCODONE-acetaminophen (PERCOCET) 5-325 MG per tablet

## 2020-01-09 LAB
6-ACETYLMORPHINE, UR: NOT DETECTED
7-AMINOCLONAZEPAM, URINE: NOT DETECTED
ALPHA-OH-ALPRAZ, URINE: NOT DETECTED
ALPRAZOLAM, URINE: NOT DETECTED
AMPHETAMINES, URINE: NOT DETECTED
BARBITURATES, URINE: NOT DETECTED
BENZOYLECGONINE, UR: NOT DETECTED
BUPRENORPHINE URINE: NOT DETECTED
CARISOPRODOL, UR: NOT DETECTED
CLONAZEPAM, URINE: NOT DETECTED
CODEINE, URINE: NOT DETECTED
CREATININE URINE: 236.1 MG/DL (ref 20–400)
DIAZEPAM, URINE: NOT DETECTED
DRUGS EXPECTED, UR: NORMAL
EER HI RES INTERP UR: NORMAL
ETHYL GLUCURONIDE UR: NOT DETECTED
FENTANYL URINE: NOT DETECTED
HYDROCODONE, URINE: NOT DETECTED
HYDROMORPHONE, URINE: NOT DETECTED
LORAZEPAM, URINE: NOT DETECTED
MARIJUANA METAB, UR: NOT DETECTED
MDA, UR: NOT DETECTED
MDEA, EVE, UR: NOT DETECTED
MDMA URINE: NOT DETECTED
MEPERIDINE METAB, UR: NOT DETECTED
METHADONE, URINE: NOT DETECTED
METHAMPHETAMINE, URINE: NOT DETECTED
METHYLPHENIDATE: NOT DETECTED
MIDAZOLAM, URINE: NOT DETECTED
MORPHINE URINE: NOT DETECTED
NORBUPRENORPHINE, URINE: NOT DETECTED
NORDIAZEPAM, URINE: NOT DETECTED
NORFENTANYL, URINE: NOT DETECTED
NORHYDROCODONE, URINE: NOT DETECTED
NOROXYCODONE, URINE: PRESENT
NOROXYMORPHONE, URINE: NOT DETECTED
OXAZEPAM, URINE: NOT DETECTED
OXYCODONE URINE: PRESENT
OXYMORPHONE, URINE: NOT DETECTED
PAIN MANAGEMENT DRUG PANEL INTERP, URINE: NORMAL
PAIN MGT DRUG PANEL, HI RES, UR: NORMAL
PCP,URINE: NOT DETECTED
PHENTERMINE, UR: NOT DETECTED
PROPOXYPHENE, URINE: NOT DETECTED
TAPENTADOL, URINE: NOT DETECTED
TAPENTADOL-O-SULFATE, URINE: NOT DETECTED
TEMAZEPAM, URINE: NOT DETECTED
TRAMADOL, URINE: NOT DETECTED
ZOLPIDEM, URINE: NOT DETECTED

## 2020-01-24 RX ORDER — RANITIDINE 150 MG/1
TABLET ORAL
Qty: 180 TABLET | Refills: 0 | Status: SHIPPED | OUTPATIENT
Start: 2020-01-24 | End: 2020-06-30

## 2020-01-31 ENCOUNTER — HOSPITAL ENCOUNTER (OUTPATIENT)
Dept: PAIN MANAGEMENT | Age: 70
Discharge: HOME | End: 2020-01-31
Payer: COMMERCIAL

## 2020-01-31 VITALS
RESPIRATION RATE: 16 BRPM | SYSTOLIC BLOOD PRESSURE: 112 MMHG | OXYGEN SATURATION: 95 % | HEART RATE: 67 BPM | DIASTOLIC BLOOD PRESSURE: 60 MMHG

## 2020-01-31 PROCEDURE — 99213 OFFICE O/P EST LOW 20 MIN: CPT

## 2020-01-31 PROCEDURE — 99213 OFFICE O/P EST LOW 20 MIN: CPT | Performed by: NURSE PRACTITIONER

## 2020-01-31 RX ORDER — OXYCODONE HYDROCHLORIDE AND ACETAMINOPHEN 5; 325 MG/1; MG/1
1 TABLET ORAL SEE ADMIN INSTRUCTIONS
Qty: 100 TABLET | Refills: 0 | Status: SHIPPED | OUTPATIENT
Start: 2020-02-05 | End: 2020-03-02 | Stop reason: SDUPTHER

## 2020-01-31 ASSESSMENT — ENCOUNTER SYMPTOMS
SHORTNESS OF BREATH: 0
COUGH: 0
BACK PAIN: 1
CONSTIPATION: 0

## 2020-01-31 NOTE — PROGRESS NOTES
CAD (coronary artery disease) 3/21/2013    Caffeine use     2 coffee/day    CHF (congestive heart failure) ([facility])     Chronic kidney disease     CKD (chronic kidney disease) stage 3, GFR 30-59 ml/min ([facility])     COPD (chronic obstructive pulmonary disease) ([facility])     emphysema    Degeneration of lumbar or lumbosacral intervertebral disc     Depression     DM (diabetes mellitus) ([facility])     Emphysema of lung ([facility])     Gastritis     GERD (gastroesophageal reflux disease)     Hearing loss     Hematuria     Hiatal hernia     HTN (hypertension), benign 6/28/2014    Hypertriglyceridemia     Incontinence of urine 9/25/2013    Knee arthropathy     Lumbosacral spondylosis without myelopathy 9/29/2014    Migraine headache     DR. BASIL [place]     Neuropathy     Obesity     On home oxygen therapy     2 L PER NC  HS AND PRN    HIGINIO on CPAP     Otitis media 1-26-15    Secondary diabetes mellitus with stage 3 chronic kidney disease (GFR 30-59) ([facility])     Stroke ([facility])     QUESTIONABLE    Tinnitus     Type 2 diabetes mellitus without complication ([facility])     Type II or unspecified type diabetes mellitus without mention of complication, not stated as uncontrolled     UTI (lower urinary tract infection)     Varicose vein        Past Surgical History:   Procedure Laterality Date    ABLATION OF DYSRHYTHMIC FOCUS  2000    CARPAL TUNNEL RELEASE Right     CATARACT REMOVAL WITH IMPLANT Bilateral     CHOLECYSTECTOMY  2007    COLONOSCOPY      COLONOSCOPY  04/10/2017    tubular adenomo polyp , mod. sigmoid diverticulosis, min. int. hemorrhoids    CYSTOSCOPY  9/25/2013    DILATION AND CURETTAGE  1979    EYE SURGERY      laser    INCONTINENCE SURGERY      Percutaneous nerve stimulation Dr Ebonie Smith Nov 2013     INSERTABLE CARDIAC MONITOR  12/04/2015    Bridj REVEAL LINQ MODEL #[id number] MRI CONDTIONAL OK 3T.  IMMEDIATELY POST IMPLANT    OTHER SURGICAL HISTORY  09/10/15    removal of interstim    RI COLSC FLX activity:     Days per week: Not on file     Minutes per session: Not on file    Stress: Not on file   Relationships    Social connections:     Talks on phone: Not on file     Gets together: Not on file     Attends Confucianism service: Not on file     Active member of club or organization: Not on file     Attends meetings of clubs or organizations: Not on file     Relationship status: Not on file    Intimate partner violence:     Fear of current or ex partner: Not on file     Emotionally abused: Not on file     Physically abused: Not on file     Forced sexual activity: Not on file   Other Topics Concern    Not on file   Social History Narrative    Not on file       Review of Systems:  Review of Systems   Constitution: Negative for chills and fever. Cardiovascular: Negative for chest pain and palpitations. Respiratory: Negative for cough and shortness of breath. Musculoskeletal: Positive for back pain. Gastrointestinal: Negative for constipation. Neurological: Positive for numbness, paresthesias, tingling and weakness. Negative for disturbances in coordination and loss of balance. Physical Exam:  /60   Pulse 67   Resp 16   LMP  (LMP Unknown)   SpO2 95%     Physical Exam  HENT:      Head: Normocephalic. Neck:      Musculoskeletal: Normal range of motion. Pulmonary:      Effort: Pulmonary effort is normal.   Musculoskeletal: Normal range of motion. Lumbar back: She exhibits tenderness and pain. Skin:     General: Skin is warm and dry. Neurological:      Mental Status: She is alert and oriented to person, place, and time.          Record/Diagnostics Review:    Last greg 1/2020 and was appropriate     Assessment:  Problem List Items Addressed This Visit     DDD (degenerative disc disease), cervical (Chronic)    Relevant Medications    oxyCODONE-acetaminophen (PERCOCET) 5-325 MG per tablet (Start on 2/5/2020)    Osteoarthritis of cervical spine (Chronic)    Relevant Medications oxyCODONE-acetaminophen (PERCOCET) 5-325 MG per tablet (Start on 2/5/2020)    Occipital neuralgia (Chronic)    Medication monitoring encounter (Chronic)    Lumbosacral spondylosis without myelopathy (Chronic)    Relevant Medications    oxyCODONE-acetaminophen (PERCOCET) 5-325 MG per tablet (Start on 2/5/2020)    Sacroiliitis, not elsewhere classified ([facility] [address] (Chronic)    Relevant Medications    oxyCODONE-acetaminophen (PERCOCET) 5-325 MG per tablet (Start on 2/5/2020)    Nonintractable migraine, unspecified migraine type    Relevant Medications    oxyCODONE-acetaminophen (PERCOCET) 5-325 MG per tablet (Start on 2/5/2020)    DDD (degenerative disc disease), lumbar    Relevant Medications    oxyCODONE-acetaminophen (PERCOCET) 5-325 MG per tablet (Start on 2/5/2020)    Spondylarthrosis - Primary    Relevant Medications    oxyCODONE-acetaminophen (PERCOCET) 5-325 MG per tablet (Start on 2/5/2020)    Lumbar radiculopathy, chronic    Relevant Medications    oxyCODONE-acetaminophen (PERCOCET) 5-325 MG per tablet (Start on 2/5/2020)             Treatment Plan:  Patient relates current medications are helping the pain. Patient reports taking pain medications as prescribed, denies obtaining medications from different sources and denies use of illegal drugs. Patient denies side effects from medications like nausea, vomiting, constipation or drowsiness. Patient reports current activities of daily living are possible due to medications and would like to continue them. As always, we encourage daily stretching and strengthening exercises, and recommend minimizing use of pain medications unless patient cannot get through daily activities due to pain. Contract requirements met. Continue opioid therapy.  Script written for percocet  Follow up appointment made for 4 weeks

## 2020-02-02 ENCOUNTER — APPOINTMENT (OUTPATIENT)
Dept: GENERAL RADIOLOGY | Age: 70
End: 2020-02-02
Payer: COMMERCIAL

## 2020-02-02 ENCOUNTER — HOSPITAL ENCOUNTER (OUTPATIENT)
Age: 70
Setting detail: OBSERVATION
Discharge: HOME | End: 2020-02-04
Attending: EMERGENCY MEDICINE | Admitting: EMERGENCY MEDICINE
Payer: COMMERCIAL

## 2020-02-02 PROBLEM — R07.9 CHEST PAIN: Status: ACTIVE | Noted: 2020-02-02

## 2020-02-02 LAB
ABSOLUTE EOS #: 0.18 K/UL (ref 0–0.44)
ABSOLUTE IMMATURE GRANULOCYTE: 0.05 K/UL (ref 0–0.3)
ABSOLUTE LYMPH #: 2.12 K/UL (ref 1.1–3.7)
ABSOLUTE MONO #: 0.75 K/UL (ref 0.1–1.2)
ANION GAP SERPL CALCULATED.3IONS-SCNC: 13 MMOL/L (ref 9–17)
BASOPHILS # BLD: 1 % (ref 0–2)
BASOPHILS ABSOLUTE: 0.07 K/UL (ref 0–0.2)
BNP INTERPRETATION: ABNORMAL
BUN BLDV-MCNC: 15 MG/DL (ref 8–23)
BUN/CREAT BLD: ABNORMAL (ref 9–20)
CALCIUM SERPL-MCNC: 8.9 MG/DL (ref 8.6–10.4)
CHLORIDE BLD-SCNC: 100 MMOL/L (ref 98–107)
CO2: 23 MMOL/L (ref 20–31)
CREAT SERPL-MCNC: 0.95 MG/DL (ref 0.5–0.9)
DIFFERENTIAL TYPE: ABNORMAL
EOSINOPHILS RELATIVE PERCENT: 3 % (ref 1–4)
GFR AFRICAN AMERICAN: >60 ML/MIN
GFR NON-AFRICAN AMERICAN: 58 ML/MIN
GFR SERPL CREATININE-BSD FRML MDRD: ABNORMAL ML/MIN/{1.73_M2}
GFR SERPL CREATININE-BSD FRML MDRD: ABNORMAL ML/MIN/{1.73_M2}
GLUCOSE BLD-MCNC: 143 MG/DL (ref 65–105)
GLUCOSE BLD-MCNC: 232 MG/DL (ref 70–99)
HCT VFR BLD CALC: 41 % (ref 36.3–47.1)
HEMOGLOBIN: 12.8 G/DL (ref 11.9–15.1)
IMMATURE GRANULOCYTES: 1 %
LYMPHOCYTES # BLD: 29 % (ref 24–43)
MCH RBC QN AUTO: 30.5 PG (ref 25.2–33.5)
MCHC RBC AUTO-ENTMCNC: 31.2 G/DL (ref 28.4–34.8)
MCV RBC AUTO: 97.6 FL (ref 82.6–102.9)
MONOCYTES # BLD: 10 % (ref 3–12)
NRBC AUTOMATED: 0 PER 100 WBC
PDW BLD-RTO: 12.7 % (ref 11.8–14.4)
PLATELET # BLD: 200 K/UL (ref 138–453)
PLATELET ESTIMATE: ABNORMAL
PMV BLD AUTO: 12.1 FL (ref 8.1–13.5)
POTASSIUM SERPL-SCNC: 4.7 MMOL/L (ref 3.7–5.3)
PRO-BNP: 363 PG/ML
RBC # BLD: 4.2 M/UL (ref 3.95–5.11)
RBC # BLD: ABNORMAL 10*6/UL
SEG NEUTROPHILS: 56 % (ref 36–65)
SEGMENTED NEUTROPHILS ABSOLUTE COUNT: 4.1 K/UL (ref 1.5–8.1)
SODIUM BLD-SCNC: 136 MMOL/L (ref 135–144)
TROPONIN INTERP: NORMAL
TROPONIN T: NORMAL NG/ML
TROPONIN, HIGH SENSITIVITY: 7 NG/L (ref 0–14)
TROPONIN, HIGH SENSITIVITY: 7 NG/L (ref 0–14)
TROPONIN, HIGH SENSITIVITY: <6 NG/L (ref 0–14)
WBC # BLD: 7.3 K/UL (ref 3.5–11.3)
WBC # BLD: ABNORMAL 10*3/UL

## 2020-02-02 PROCEDURE — G0378 HOSPITAL OBSERVATION PER HR: HCPCS

## 2020-02-02 PROCEDURE — 6370000000 HC RX 637 (ALT 250 FOR IP): Performed by: STUDENT IN AN ORGANIZED HEALTH CARE EDUCATION/TRAINING PROGRAM

## 2020-02-02 PROCEDURE — 82947 ASSAY GLUCOSE BLOOD QUANT: CPT

## 2020-02-02 PROCEDURE — 36415 COLL VENOUS BLD VENIPUNCTURE: CPT

## 2020-02-02 PROCEDURE — 84484 ASSAY OF TROPONIN QUANT: CPT

## 2020-02-02 PROCEDURE — 93005 ELECTROCARDIOGRAM TRACING: CPT | Performed by: STUDENT IN AN ORGANIZED HEALTH CARE EDUCATION/TRAINING PROGRAM

## 2020-02-02 PROCEDURE — 2580000003 HC RX 258: Performed by: STUDENT IN AN ORGANIZED HEALTH CARE EDUCATION/TRAINING PROGRAM

## 2020-02-02 PROCEDURE — 71045 X-RAY EXAM CHEST 1 VIEW: CPT

## 2020-02-02 PROCEDURE — 94640 AIRWAY INHALATION TREATMENT: CPT

## 2020-02-02 PROCEDURE — 83880 ASSAY OF NATRIURETIC PEPTIDE: CPT

## 2020-02-02 PROCEDURE — 80048 BASIC METABOLIC PNL TOTAL CA: CPT

## 2020-02-02 PROCEDURE — 99285 EMERGENCY DEPT VISIT HI MDM: CPT

## 2020-02-02 PROCEDURE — 85025 COMPLETE CBC W/AUTO DIFF WBC: CPT

## 2020-02-02 RX ORDER — FUROSEMIDE 10 MG/ML
20 INJECTION INTRAMUSCULAR; INTRAVENOUS ONCE
Status: COMPLETED | OUTPATIENT
Start: 2020-02-02 | End: 2020-02-03

## 2020-02-02 RX ORDER — TOPIRAMATE 100 MG/1
100 TABLET, FILM COATED ORAL NIGHTLY
Status: DISCONTINUED | OUTPATIENT
Start: 2020-02-02 | End: 2020-02-04 | Stop reason: HOSPADM

## 2020-02-02 RX ORDER — ATORVASTATIN CALCIUM 80 MG/1
80 TABLET, FILM COATED ORAL DAILY
Status: DISCONTINUED | OUTPATIENT
Start: 2020-02-03 | End: 2020-02-04 | Stop reason: HOSPADM

## 2020-02-02 RX ORDER — CARVEDILOL 3.12 MG/1
3.12 TABLET ORAL 2 TIMES DAILY
Status: DISCONTINUED | OUTPATIENT
Start: 2020-02-02 | End: 2020-02-04 | Stop reason: HOSPADM

## 2020-02-02 RX ORDER — SODIUM CHLORIDE 0.9 % (FLUSH) 0.9 %
10 SYRINGE (ML) INJECTION PRN
Status: DISCONTINUED | OUTPATIENT
Start: 2020-02-02 | End: 2020-02-04 | Stop reason: HOSPADM

## 2020-02-02 RX ORDER — DEXTROSE MONOHYDRATE 25 G/50ML
12.5 INJECTION, SOLUTION INTRAVENOUS PRN
Status: DISCONTINUED | OUTPATIENT
Start: 2020-02-02 | End: 2020-02-02

## 2020-02-02 RX ORDER — CLOPIDOGREL BISULFATE 75 MG/1
75 TABLET ORAL DAILY
Status: DISCONTINUED | OUTPATIENT
Start: 2020-02-03 | End: 2020-02-04 | Stop reason: HOSPADM

## 2020-02-02 RX ORDER — UREA 10 %
10 LOTION (ML) TOPICAL NIGHTLY
Status: DISCONTINUED | OUTPATIENT
Start: 2020-02-02 | End: 2020-02-04 | Stop reason: HOSPADM

## 2020-02-02 RX ORDER — ASPIRIN 81 MG/1
81 TABLET, CHEWABLE ORAL DAILY
Status: DISCONTINUED | OUTPATIENT
Start: 2020-02-03 | End: 2020-02-04 | Stop reason: HOSPADM

## 2020-02-02 RX ORDER — NITROGLYCERIN 0.4 MG/1
0.4 TABLET SUBLINGUAL EVERY 5 MIN PRN
Status: DISCONTINUED | OUTPATIENT
Start: 2020-02-02 | End: 2020-02-04 | Stop reason: HOSPADM

## 2020-02-02 RX ORDER — ACETAMINOPHEN 325 MG/1
650 TABLET ORAL EVERY 4 HOURS PRN
Status: DISCONTINUED | OUTPATIENT
Start: 2020-02-02 | End: 2020-02-04 | Stop reason: HOSPADM

## 2020-02-02 RX ORDER — OXYCODONE HYDROCHLORIDE AND ACETAMINOPHEN 5; 325 MG/1; MG/1
1 TABLET ORAL EVERY 8 HOURS PRN
Status: DISCONTINUED | OUTPATIENT
Start: 2020-02-02 | End: 2020-02-03

## 2020-02-02 RX ORDER — LOSARTAN POTASSIUM 25 MG/1
25 TABLET ORAL DAILY
Status: DISCONTINUED | OUTPATIENT
Start: 2020-02-03 | End: 2020-02-04 | Stop reason: HOSPADM

## 2020-02-02 RX ORDER — METHYLPREDNISOLONE SODIUM SUCCINATE 125 MG/2ML
40 INJECTION, POWDER, LYOPHILIZED, FOR SOLUTION INTRAMUSCULAR; INTRAVENOUS EVERY 6 HOURS
Status: CANCELLED | OUTPATIENT
Start: 2020-02-02

## 2020-02-02 RX ORDER — ALBUTEROL SULFATE 2.5 MG/3ML
2.5 SOLUTION RESPIRATORY (INHALATION)
Status: DISCONTINUED | OUTPATIENT
Start: 2020-02-02 | End: 2020-02-04 | Stop reason: HOSPADM

## 2020-02-02 RX ORDER — PANTOPRAZOLE SODIUM 40 MG/1
40 TABLET, DELAYED RELEASE ORAL 2 TIMES DAILY
Status: DISCONTINUED | OUTPATIENT
Start: 2020-02-02 | End: 2020-02-04 | Stop reason: HOSPADM

## 2020-02-02 RX ORDER — SODIUM CHLORIDE 0.9 % (FLUSH) 0.9 %
10 SYRINGE (ML) INJECTION EVERY 12 HOURS SCHEDULED
Status: DISCONTINUED | OUTPATIENT
Start: 2020-02-02 | End: 2020-02-04 | Stop reason: HOSPADM

## 2020-02-02 RX ORDER — INSULIN GLARGINE 100 [IU]/ML
40 INJECTION, SOLUTION SUBCUTANEOUS 2 TIMES DAILY
Status: DISCONTINUED | OUTPATIENT
Start: 2020-02-02 | End: 2020-02-04 | Stop reason: HOSPADM

## 2020-02-02 RX ORDER — DEXTROSE MONOHYDRATE 50 MG/ML
100 INJECTION, SOLUTION INTRAVENOUS PRN
Status: DISCONTINUED | OUTPATIENT
Start: 2020-02-02 | End: 2020-02-02

## 2020-02-02 RX ORDER — GABAPENTIN 300 MG/1
300 CAPSULE ORAL 3 TIMES DAILY
Status: DISCONTINUED | OUTPATIENT
Start: 2020-02-02 | End: 2020-02-04 | Stop reason: HOSPADM

## 2020-02-02 RX ORDER — IPRATROPIUM BROMIDE AND ALBUTEROL SULFATE 2.5; .5 MG/3ML; MG/3ML
3 SOLUTION RESPIRATORY (INHALATION)
Status: DISCONTINUED | OUTPATIENT
Start: 2020-02-03 | End: 2020-02-04 | Stop reason: HOSPADM

## 2020-02-02 RX ORDER — IPRATROPIUM BROMIDE AND ALBUTEROL SULFATE 2.5; .5 MG/3ML; MG/3ML
3 SOLUTION RESPIRATORY (INHALATION) EVERY 4 HOURS
Status: DISCONTINUED | OUTPATIENT
Start: 2020-02-02 | End: 2020-02-02

## 2020-02-02 RX ORDER — CITALOPRAM 20 MG/1
20 TABLET ORAL DAILY
Status: DISCONTINUED | OUTPATIENT
Start: 2020-02-03 | End: 2020-02-04 | Stop reason: HOSPADM

## 2020-02-02 RX ORDER — NICOTINE POLACRILEX 4 MG
15 LOZENGE BUCCAL PRN
Status: DISCONTINUED | OUTPATIENT
Start: 2020-02-02 | End: 2020-02-02

## 2020-02-02 RX ADMIN — GABAPENTIN 300 MG: 300 CAPSULE ORAL at 23:06

## 2020-02-02 RX ADMIN — Medication 10 MG: at 23:06

## 2020-02-02 RX ADMIN — MOMETASONE FUROATE AND FORMOTEROL FUMARATE DIHYDRATE 2 PUFF: 200; 5 AEROSOL RESPIRATORY (INHALATION) at 21:26

## 2020-02-02 RX ADMIN — CARVEDILOL 3.12 MG: 3.12 TABLET, FILM COATED ORAL at 23:06

## 2020-02-02 RX ADMIN — PANTOPRAZOLE SODIUM 40 MG: 40 TABLET, DELAYED RELEASE ORAL at 23:06

## 2020-02-02 RX ADMIN — IPRATROPIUM BROMIDE AND ALBUTEROL SULFATE 3 ML: .5; 3 SOLUTION RESPIRATORY (INHALATION) at 21:26

## 2020-02-02 RX ADMIN — SODIUM CHLORIDE, PRESERVATIVE FREE 10 ML: 5 INJECTION INTRAVENOUS at 23:12

## 2020-02-02 RX ADMIN — TOPIRAMATE 100 MG: 100 TABLET, FILM COATED ORAL at 23:06

## 2020-02-02 RX ADMIN — OXYCODONE HYDROCHLORIDE AND ACETAMINOPHEN 1 TABLET: 5; 325 TABLET ORAL at 17:54

## 2020-02-02 ASSESSMENT — PAIN DESCRIPTION - LOCATION: LOCATION: CHEST

## 2020-02-02 ASSESSMENT — PAIN SCALES - GENERAL
PAINLEVEL_OUTOF10: 8
PAINLEVEL_OUTOF10: 7

## 2020-02-02 ASSESSMENT — PAIN DESCRIPTION - PAIN TYPE: TYPE: ACUTE PAIN

## 2020-02-02 ASSESSMENT — ENCOUNTER SYMPTOMS
VOMITING: 0
BACK PAIN: 1
SORE THROAT: 0
RHINORRHEA: 0
PHOTOPHOBIA: 0
COUGH: 1
SINUS PRESSURE: 0
CHEST TIGHTNESS: 0
SHORTNESS OF BREATH: 1
DIARRHEA: 1
NAUSEA: 0
ABDOMINAL PAIN: 1

## 2020-02-02 ASSESSMENT — PAIN DESCRIPTION - FREQUENCY: FREQUENCY: CONTINUOUS

## 2020-02-02 ASSESSMENT — PAIN DESCRIPTION - DESCRIPTORS: DESCRIPTORS: PRESSURE

## 2020-02-02 ASSESSMENT — PAIN DESCRIPTION - ORIENTATION: ORIENTATION: MID

## 2020-02-02 NOTE — ED NOTES
Per Dr Susanne Goodell, disregard insulin orders placed. Supposed to be floor orders.       Dominique Mo RN  02/02/20 2023

## 2020-02-02 NOTE — ED PROVIDER NOTES
and speech difficulty. Psychiatric/Behavioral: Negative for agitation, confusion and decreased concentration. Extremities: No pedal edema    PAST MEDICAL HISTORY    has a past medical history of Allergic rhinitis, Anxiety, Arthritis, Asthma, Atrial fibrillation ([facility]), Back pain, Benign hypertension with CKD (chronic kidney disease) stage III ([address] CAD (coronary artery disease), Caffeine use, CHF (congestive heart failure) ([address] Chronic kidney disease, CKD (chronic kidney disease) stage 3, GFR 30-59 ml/min ([facility]), COPD (chronic obstructive pulmonary disease) ([address] Degeneration of lumbar or lumbosacral intervertebral disc, Depression, DM (diabetes mellitus) ([address] Emphysema of lung ([address] Gastritis, GERD (gastroesophageal reflux disease), Hearing loss, Hematuria, Hiatal hernia, HTN (hypertension), benign, Hypertriglyceridemia, Incontinence of urine, Knee arthropathy, Lumbosacral spondylosis without myelopathy, Migraine headache, Mumps, Neuropathy, Obesity, On home oxygen therapy, HIGINIO on CPAP, Otitis media, Secondary diabetes mellitus with stage 3 chronic kidney disease (GFR 30-59) ([address] Stroke ([address] Tinnitus, Type 2 diabetes mellitus without complication ([address] Type II or unspecified type diabetes mellitus without mention of complication, not stated as uncontrolled, UTI (lower urinary tract infection), and Varicose vein. SURGICAL HISTORY      has a past surgical history that includes Cholecystectomy (2007); Carpal tunnel release (Right); Tympanoplasty; Dilation & curettage (1979); Cystocopy (9/25/2013); Cataract removal with implant (Bilateral); Tonsillectomy; Incontinence surgery; ablation of dysrhythmic focus (2000); Upper gastrointestinal endoscopy (03/2016); eye surgery; Tubal ligation; other surgical history (09/10/15); Insertable Cardiac Monitor (12/04/2015); Tympanoplasty; Colonoscopy;  Colonoscopy (04/10/2017); pr colsc flx w/removal lesion by hot bx forceps (N/A, 4/10/2017); and Tympanostomy tube cardiologist.    EKG Interpretation     Interpreted by emergency department physician - Justo Cornelius     Rhythm: normal sinus   Rate: normal  Axis: left  Ectopy: none  Conduction: normal  ST Segments: normal  T Waves: normal  Q Waves: none     Clinical Impression: non-specific EKG    RADIOLOGY:   I directly visualized the following  images and reviewed the radiologist interpretations:  XR CHEST PORTABLE   Final Result   Cardiomegaly without acute cardiopulmonary process. LABS:  Labs Reviewed   BRAIN NATRIURETIC PEPTIDE - Abnormal; Notable for the following components:       Result Value    Pro- (*)     All other components within normal limits   CBC WITH AUTO DIFFERENTIAL - Abnormal; Notable for the following components:    Immature Granulocytes 1 (*)     All other components within normal limits   BASIC METABOLIC PANEL W/ REFLEX TO MG FOR LOW K - Abnormal; Notable for the following components:    Glucose 232 (*)     CREATININE 0.95 (*)     GFR Non- 58 (*)     All other components within normal limits   TROPONIN   TROPONIN   TROPONIN   POCT GLUCOSE         EMERGENCY DEPARTMENT COURSE:   Vitals:    Vitals:    02/02/20 1816 02/02/20 1844 02/02/20 1858 02/02/20 1902   BP: 134/78 (!) 156/78 (!) 144/70 (!) 149/95   Pulse: 62 67 67 67   Resp: 19 22 22 27   Temp:       TempSrc:       SpO2: 96% 96% 96% 97%   Weight:       Height:             CONSULTS:  None      FINAL IMPRESSION    No diagnosis found.       DISPOSITION/PLAN   DISPOSITION Admitted 02/02/2020 07:05:40 PM    Admit to Obs  · Atypical Chest Pain  · Likely costochondritis-  Constant, reproducible on palpation  · Heart score 5 -history of chest pain, age>65, 3 risk factors (DM, HTN, TIA)  · Troponins negative  · EKG non specific  · Pulmonary Embolism  · Wells score 3  · Bilateral basal lung opacities  · Possible atelectasis secondary to chronic back pain and costochondritis  · Chronic back pain  · Patient on chronic opioid

## 2020-02-02 NOTE — ED PROVIDER NOTES
[facility]     Emergency Department     Faculty Attestation    I performed a history and physical examination of the patient and discussed management with the resident. I reviewed the residents note and agree with the documented findings and plan of care. Any areas of disagreement are noted on the chart. I was personally present for the key portions of any procedures. I have documented in the chart those procedures where I was not present during the key portions. I have reviewed the emergency nurses triage note. I agree with the chief complaint, past medical history, past surgical history, allergies, medications, social and family history as documented unless otherwise noted below. Documentation of the HPI, Physical Exam and Medical Decision Making performed by medical students or scribes is based on my personal performance of the HPI, PE and MDM. For Physician Assistant/ Nurse Practitioner cases/documentation I have personally evaluated this patient and have completed at least one if not all key elements of the E/M (history, physical exam, and MDM). Additional findings are as noted.     Vital signs:   Vitals:    02/02/20 1656   BP: (!) 143/83   Pulse: 65   Resp: 23   Temp:    SpO2: 94%      EKG Interpretation    Interpreted by emergency department physician    Rhythm: normal sinus   Rate: normal  Axis: left  Ectopy: none  Conduction: normal  ST Segments: normal  T Waves: normal  Q Waves: none    Clinical Impression: non-specific EKG    Richard Marlow M.D,  Attending Emergency  Physician           Maureen Kendall MD  02/02/20 [id number]

## 2020-02-02 NOTE — ED NOTES
Bed: 23  Expected date:   Expected time:   Means of arrival:   Comments:   [facility], RN  02/02/20 1876

## 2020-02-02 NOTE — ED NOTES
2nd trop drawn labeled and sent to lab. Pt resting with lights dimmed, blanket provided. Pt medicated for pain per orders.  Resident at bedside to speak with pt.      Blanca Apple RN  02/02/20 [id number]

## 2020-02-03 LAB
GLUCOSE BLD-MCNC: 166 MG/DL (ref 65–105)
GLUCOSE BLD-MCNC: 307 MG/DL (ref 65–105)
GLUCOSE BLD-MCNC: 321 MG/DL (ref 65–105)
GLUCOSE BLD-MCNC: 365 MG/DL (ref 65–105)
LV EF: 66 %
LVEF MODALITY: NORMAL

## 2020-02-03 PROCEDURE — 94660 CPAP INITIATION&MGMT: CPT

## 2020-02-03 PROCEDURE — 6370000000 HC RX 637 (ALT 250 FOR IP): Performed by: EMERGENCY MEDICINE

## 2020-02-03 PROCEDURE — 6360000002 HC RX W HCPCS: Performed by: STUDENT IN AN ORGANIZED HEALTH CARE EDUCATION/TRAINING PROGRAM

## 2020-02-03 PROCEDURE — 2700000000 HC OXYGEN THERAPY PER DAY

## 2020-02-03 PROCEDURE — 2580000003 HC RX 258: Performed by: STUDENT IN AN ORGANIZED HEALTH CARE EDUCATION/TRAINING PROGRAM

## 2020-02-03 PROCEDURE — 6370000000 HC RX 637 (ALT 250 FOR IP): Performed by: STUDENT IN AN ORGANIZED HEALTH CARE EDUCATION/TRAINING PROGRAM

## 2020-02-03 PROCEDURE — 94640 AIRWAY INHALATION TREATMENT: CPT

## 2020-02-03 PROCEDURE — 93970 EXTREMITY STUDY: CPT

## 2020-02-03 PROCEDURE — 97162 PT EVAL MOD COMPLEX 30 MIN: CPT

## 2020-02-03 PROCEDURE — 96372 THER/PROPH/DIAG INJ SC/IM: CPT

## 2020-02-03 PROCEDURE — 82947 ASSAY GLUCOSE BLOOD QUANT: CPT

## 2020-02-03 PROCEDURE — 97530 THERAPEUTIC ACTIVITIES: CPT

## 2020-02-03 PROCEDURE — 93306 TTE W/DOPPLER COMPLETE: CPT

## 2020-02-03 PROCEDURE — 93005 ELECTROCARDIOGRAM TRACING: CPT | Performed by: EMERGENCY MEDICINE

## 2020-02-03 PROCEDURE — 96374 THER/PROPH/DIAG INJ IV PUSH: CPT

## 2020-02-03 PROCEDURE — G0378 HOSPITAL OBSERVATION PER HR: HCPCS

## 2020-02-03 RX ORDER — OXYCODONE HYDROCHLORIDE AND ACETAMINOPHEN 5; 325 MG/1; MG/1
1 TABLET ORAL EVERY 6 HOURS PRN
Status: DISCONTINUED | OUTPATIENT
Start: 2020-02-03 | End: 2020-02-03

## 2020-02-03 RX ORDER — SODIUM CHLORIDE 9 MG/ML
500 INJECTION, SOLUTION INTRAVENOUS CONTINUOUS PRN
Status: DISCONTINUED | OUTPATIENT
Start: 2020-02-03 | End: 2020-02-04 | Stop reason: SDUPTHER

## 2020-02-03 RX ORDER — SODIUM CHLORIDE 9 MG/ML
500 INJECTION, SOLUTION INTRAVENOUS CONTINUOUS PRN
Status: DISCONTINUED | OUTPATIENT
Start: 2020-02-03 | End: 2020-02-03

## 2020-02-03 RX ORDER — ATROPINE SULFATE 0.1 MG/ML
0.5 INJECTION INTRAVENOUS EVERY 5 MIN PRN
Status: DISCONTINUED | OUTPATIENT
Start: 2020-02-03 | End: 2020-02-04 | Stop reason: SDUPTHER

## 2020-02-03 RX ORDER — METOPROLOL TARTRATE 5 MG/5ML
5 INJECTION INTRAVENOUS EVERY 5 MIN PRN
Status: DISCONTINUED | OUTPATIENT
Start: 2020-02-03 | End: 2020-02-04 | Stop reason: SDUPTHER

## 2020-02-03 RX ORDER — AMINOPHYLLINE DIHYDRATE 25 MG/ML
50 INJECTION, SOLUTION INTRAVENOUS PRN
Status: DISCONTINUED | OUTPATIENT
Start: 2020-02-03 | End: 2020-02-03

## 2020-02-03 RX ORDER — SODIUM CHLORIDE 0.9 % (FLUSH) 0.9 %
10 SYRINGE (ML) INJECTION PRN
Status: DISCONTINUED | OUTPATIENT
Start: 2020-02-03 | End: 2020-02-04 | Stop reason: SDUPTHER

## 2020-02-03 RX ORDER — PREDNISONE 20 MG/1
40 TABLET ORAL DAILY
Qty: 10 TABLET | Refills: 0 | Status: SHIPPED | OUTPATIENT
Start: 2020-02-04 | End: 2020-02-09

## 2020-02-03 RX ORDER — NITROGLYCERIN 0.4 MG/1
0.4 TABLET SUBLINGUAL EVERY 5 MIN PRN
Status: DISCONTINUED | OUTPATIENT
Start: 2020-02-03 | End: 2020-02-03

## 2020-02-03 RX ORDER — SODIUM CHLORIDE 0.9 % (FLUSH) 0.9 %
10 SYRINGE (ML) INJECTION PRN
Status: DISCONTINUED | OUTPATIENT
Start: 2020-02-03 | End: 2020-02-03

## 2020-02-03 RX ORDER — SODIUM CHLORIDE 9 MG/ML
INJECTION, SOLUTION INTRAVENOUS CONTINUOUS
Status: DISCONTINUED | OUTPATIENT
Start: 2020-02-03 | End: 2020-02-03

## 2020-02-03 RX ORDER — PREDNISONE 20 MG/1
40 TABLET ORAL DAILY
Status: DISCONTINUED | OUTPATIENT
Start: 2020-02-03 | End: 2020-02-04 | Stop reason: HOSPADM

## 2020-02-03 RX ORDER — NITROGLYCERIN 0.4 MG/1
0.4 TABLET SUBLINGUAL EVERY 5 MIN PRN
Status: DISCONTINUED | OUTPATIENT
Start: 2020-02-03 | End: 2020-02-04 | Stop reason: SDUPTHER

## 2020-02-03 RX ORDER — ATROPINE SULFATE 0.1 MG/ML
0.5 INJECTION INTRAVENOUS EVERY 5 MIN PRN
Status: DISCONTINUED | OUTPATIENT
Start: 2020-02-03 | End: 2020-02-03

## 2020-02-03 RX ORDER — ALBUTEROL SULFATE 90 UG/1
2 AEROSOL, METERED RESPIRATORY (INHALATION) PRN
Status: DISCONTINUED | OUTPATIENT
Start: 2020-02-03 | End: 2020-02-04 | Stop reason: SDUPTHER

## 2020-02-03 RX ORDER — AMINOPHYLLINE DIHYDRATE 25 MG/ML
50 INJECTION, SOLUTION INTRAVENOUS PRN
Status: DISCONTINUED | OUTPATIENT
Start: 2020-02-03 | End: 2020-02-04 | Stop reason: SDUPTHER

## 2020-02-03 RX ORDER — OXYCODONE HYDROCHLORIDE AND ACETAMINOPHEN 5; 325 MG/1; MG/1
2 TABLET ORAL EVERY 6 HOURS PRN
Status: DISCONTINUED | OUTPATIENT
Start: 2020-02-03 | End: 2020-02-04 | Stop reason: HOSPADM

## 2020-02-03 RX ORDER — METOPROLOL TARTRATE 5 MG/5ML
5 INJECTION INTRAVENOUS EVERY 5 MIN PRN
Status: DISCONTINUED | OUTPATIENT
Start: 2020-02-03 | End: 2020-02-03

## 2020-02-03 RX ORDER — ALBUTEROL SULFATE 90 UG/1
2 AEROSOL, METERED RESPIRATORY (INHALATION) PRN
Status: DISCONTINUED | OUTPATIENT
Start: 2020-02-03 | End: 2020-02-03

## 2020-02-03 RX ADMIN — IPRATROPIUM BROMIDE AND ALBUTEROL SULFATE 3 ML: .5; 3 SOLUTION RESPIRATORY (INHALATION) at 14:55

## 2020-02-03 RX ADMIN — TOPIRAMATE 100 MG: 100 TABLET, FILM COATED ORAL at 20:46

## 2020-02-03 RX ADMIN — PREDNISONE 40 MG: 20 TABLET ORAL at 10:32

## 2020-02-03 RX ADMIN — INSULIN LISPRO 6 UNITS: 100 INJECTION, SOLUTION INTRAVENOUS; SUBCUTANEOUS at 13:09

## 2020-02-03 RX ADMIN — INSULIN LISPRO 7 UNITS: 100 INJECTION, SOLUTION INTRAVENOUS; SUBCUTANEOUS at 20:57

## 2020-02-03 RX ADMIN — INSULIN GLARGINE 40 UNITS: 100 INJECTION, SOLUTION SUBCUTANEOUS at 00:24

## 2020-02-03 RX ADMIN — ATORVASTATIN CALCIUM 80 MG: 80 TABLET, FILM COATED ORAL at 10:28

## 2020-02-03 RX ADMIN — OXYCODONE HYDROCHLORIDE AND ACETAMINOPHEN 1 TABLET: 5; 325 TABLET ORAL at 17:00

## 2020-02-03 RX ADMIN — ASPIRIN 81 MG: 81 TABLET, CHEWABLE ORAL at 10:25

## 2020-02-03 RX ADMIN — PANTOPRAZOLE SODIUM 40 MG: 40 TABLET, DELAYED RELEASE ORAL at 10:25

## 2020-02-03 RX ADMIN — INSULIN LISPRO 12 UNITS: 100 INJECTION, SOLUTION INTRAVENOUS; SUBCUTANEOUS at 18:17

## 2020-02-03 RX ADMIN — INSULIN GLARGINE 40 UNITS: 100 INJECTION, SOLUTION SUBCUTANEOUS at 20:57

## 2020-02-03 RX ADMIN — CITALOPRAM 20 MG: 20 TABLET, FILM COATED ORAL at 10:27

## 2020-02-03 RX ADMIN — FUROSEMIDE 20 MG: 10 INJECTION, SOLUTION INTRAMUSCULAR; INTRAVENOUS at 13:13

## 2020-02-03 RX ADMIN — IPRATROPIUM BROMIDE AND ALBUTEROL SULFATE 3 ML: .5; 3 SOLUTION RESPIRATORY (INHALATION) at 07:46

## 2020-02-03 RX ADMIN — LOSARTAN POTASSIUM 25 MG: 25 TABLET, FILM COATED ORAL at 10:25

## 2020-02-03 RX ADMIN — CARVEDILOL 3.12 MG: 3.12 TABLET, FILM COATED ORAL at 10:26

## 2020-02-03 RX ADMIN — CARVEDILOL 3.12 MG: 3.12 TABLET, FILM COATED ORAL at 20:46

## 2020-02-03 RX ADMIN — SODIUM CHLORIDE, PRESERVATIVE FREE 10 ML: 5 INJECTION INTRAVENOUS at 10:27

## 2020-02-03 RX ADMIN — ENOXAPARIN SODIUM 40 MG: 40 INJECTION SUBCUTANEOUS at 10:26

## 2020-02-03 RX ADMIN — GABAPENTIN 300 MG: 300 CAPSULE ORAL at 20:46

## 2020-02-03 RX ADMIN — OXYCODONE HYDROCHLORIDE AND ACETAMINOPHEN 1 TABLET: 5; 325 TABLET ORAL at 06:02

## 2020-02-03 RX ADMIN — PANTOPRAZOLE SODIUM 40 MG: 40 TABLET, DELAYED RELEASE ORAL at 20:46

## 2020-02-03 RX ADMIN — MOMETASONE FUROATE AND FORMOTEROL FUMARATE DIHYDRATE 2 PUFF: 200; 5 AEROSOL RESPIRATORY (INHALATION) at 07:46

## 2020-02-03 RX ADMIN — NITROGLYCERIN 0.4 MG: 0.4 TABLET SUBLINGUAL at 17:07

## 2020-02-03 RX ADMIN — OXYCODONE HYDROCHLORIDE AND ACETAMINOPHEN 1 TABLET: 5; 325 TABLET ORAL at 00:23

## 2020-02-03 RX ADMIN — SODIUM CHLORIDE, PRESERVATIVE FREE 10 ML: 5 INJECTION INTRAVENOUS at 20:47

## 2020-02-03 RX ADMIN — INSULIN GLARGINE 40 UNITS: 100 INJECTION, SOLUTION SUBCUTANEOUS at 10:30

## 2020-02-03 RX ADMIN — Medication 10 MG: at 20:46

## 2020-02-03 RX ADMIN — NITROGLYCERIN 0.4 MG: 0.4 TABLET SUBLINGUAL at 17:16

## 2020-02-03 RX ADMIN — CLOPIDOGREL 75 MG: 75 TABLET, FILM COATED ORAL at 10:24

## 2020-02-03 RX ADMIN — GABAPENTIN 300 MG: 300 CAPSULE ORAL at 10:25

## 2020-02-03 RX ADMIN — OXYCODONE HYDROCHLORIDE AND ACETAMINOPHEN 2 TABLET: 5; 325 TABLET ORAL at 23:10

## 2020-02-03 ASSESSMENT — PAIN DESCRIPTION - PAIN TYPE: TYPE: ACUTE PAIN

## 2020-02-03 ASSESSMENT — PAIN SCALES - GENERAL
PAINLEVEL_OUTOF10: 7
PAINLEVEL_OUTOF10: 8
PAINLEVEL_OUTOF10: 8
PAINLEVEL_OUTOF10: 5
PAINLEVEL_OUTOF10: 7
PAINLEVEL_OUTOF10: 6
PAINLEVEL_OUTOF10: 5

## 2020-02-03 ASSESSMENT — PAIN DESCRIPTION - LOCATION
LOCATION: CHEST
LOCATION: CHEST

## 2020-02-03 NOTE — H&P
% injection 10 mL, 2 times per day  sodium chloride flush 0.9 % injection 10 mL, PRN  acetaminophen (TYLENOL) tablet 650 mg, Q4H PRN  enoxaparin (LOVENOX) injection 40 mg, Daily  aspirin chewable tablet 81 mg, Daily  atorvastatin (LIPITOR) tablet 80 mg, Daily  carvedilol (COREG) tablet 3.125 mg, BID  citalopram (CELEXA) tablet 20 mg, Daily  clopidogrel (PLAVIX) tablet 75 mg, Daily  gabapentin (NEURONTIN) capsule 300 mg, TID  losartan (COZAAR) tablet 25 mg, Daily  melatonin tablet 10 mg, Nightly  nitroGLYCERIN (NITROSTAT) SL tablet 0.4 mg, Q5 Min PRN  pantoprazole (PROTONIX) tablet 40 mg, BID  topiramate (TOPAMAX) tablet 100 mg, Nightly  furosemide (LASIX) injection 20 mg, Once  mometasone-formoterol (DULERA) 200-5 MCG/ACT inhaler 2 puff, BID  albuterol (PROVENTIL) nebulizer solution 2.5 mg, As Directed RT PRN  ipratropium-albuterol (DUONEB) nebulizer solution 3 mL, Q4H WA        All medication charted and reviewed. ALLERGIES     is allergic to robitussin [guaifenesin]; codeine; compazine [prochlorperazine maleate]; iodides; morphine; moxifloxacin; reglan [metoclopramide]; avelox [moxifloxacin hcl in nacl]; cipro xr; and sulfa antibiotics. FAMILY HISTORY     She indicated that her mother is . She indicated that her father is . She indicated that her sister is . She indicated that the status of her maternal grandmother is unknown.     family history includes Diabetes in her maternal grandmother and mother; Emphysema in her sister; Heart Disease in her mother; Stomach Cancer in her father. The patient denies any pertinent family history. I have reviewed and agree with the family history entered. I have reviewed the Family History and it is not significant to the case    SOCIAL HISTORY      reports that she quit smoking about 20 years ago. Her smoking use included cigarettes. She has a 123.00 pack-year smoking history.  She has never used smokeless tobacco. She reports that she does not drink alcohol or use drugs. I have reviewed and agree with all Social.  There are no concerns for substance abuse/use. PHYSICAL EXAM     INITIAL VITALS:  height is 5' 2\" (1.575 m) and weight is 255 lb (115.7 kg). Her oral temperature is 98 °F (36.7 °C). Her blood pressure is 135/68 and her pulse is 63. Her respiration is 20 and oxygen saturation is 93%. CONSTITUTIONAL: AOx4, no apparent distress, appears stated age   HEAD: normocephalic, atraumatic   EYES: PERRLA, EOMI    ENT: moist mucous membranes, uvula midline   NECK: supple, symmetric   BACK: symmetric   LUNGS: clear to auscultation bilaterally   CARDIOVASCULAR: regular rate and rhythm, no murmurs, rubs or gallops   ABDOMEN: soft, non-tender, non-distended with normal active bowel sounds   NEUROLOGIC:  MAEx4, no focal sensory or motor deficits   MUSCULOSKELETAL: no clubbing, cyanosis or edema   SKIN: no rash or wounds       DIFFERENTIAL DIAGNOSIS/MDM:     Chest Pain:  DDX: Emergent: ACS/NSTEMI/STEMI/angina, arrhythmia, trauma, aortic dissection,  PE, PNA, pneumothroax, esophageal rupture, tamponade, Cocaine use  Nonemergent: pneumonia, pericarditis, GERD, MSK, Endocarditis, anxiety  Evaluated for: diaphoresis, present chest pain, tachypnea, BP both arms, heart sounds, JVD, tender chest wall, wheezing    DIAGNOSTIC RESULTS     EKG: All EKG's are interpreted by the Observation Physician who either signs or Co-signs this chart in the absence of a cardiologist.    EKG Interpretation    Interpreted by observation physician.     Rhythm: normal sinus   Rate: normal  Axis: left  Ectopy: none  Conduction: normal  ST Segments: no acute change  T Waves: no acute change  Q Waves: none    Clinical Impression: no acute changes    Orlando Lopez MD        RADIOLOGY:   I directly visualized the following  images and reviewed the radiologist interpretations:    Xr Chest Portable    Result Date: 2/2/2020  EXAMINATION: ONE XRAY VIEW OF THE CHEST 2/2/2020 4:03 pm Normal Limit (0 pts)   [] 1-3 Times Normal Limit (1 pt)   [] > 3 Times Normal Limit (2 pts)  TOTAL: 5    Percent Risk for Major Adverse Cardiac Event (MACE)  0-3 pts indicates low risk for MACE   2.5% (DISCHARGE)   4-7 pts indicates moderate risk for MACE  20.3% (OBS)  8-10 pts indicates high risk for MACE  72.7% (EARLY INVASIVE [place])    CDU IMPRESSION / Filipe Boone is a 78 y.o. female who presents with:    1. Acute sharp midsternal chest pain without radiation due to unclear etiology, initial encounter  a. Patient admitted for cardiology evaluation, who saw pt and advised outpatient f/u for routine cardiology preventative care, treat MSK pain  b. Echo pending    · Continue home medications and pain control  · Monitor vitals, labs, and imaging  · DISPO: pending consults and clinical improvement    CONSULTS:    None    PROCEDURES:  Not indicated      PATIENT REFERRED TO:    MD Wiliam Ogden  [id number] [facility] [id number]  [address] [place] [address] Nettie Tapia M.D., PGY-1  Emergency Medicine Resident Physician     This dictation was generated by voice recognition computer software. Although all attempts are made to edit the dictation for accuracy, there may be errors in the transcription that are not intended.

## 2020-02-03 NOTE — PROGRESS NOTES
[facility] Chest pain [R07.9] . Vitals:    02/02/20 2103   BP: 135/68   Pulse: 63   Resp: 20   Temp: 98 °F (36.7 °C)   SpO2: 93%   . Patients home meds are   Prior to Admission medications    Medication Sig Start Date End Date Taking? Authorizing Provider   oxyCODONE-acetaminophen (PERCOCET) 5-325 MG per tablet Take 1 tablet by mouth See Admin Instructions for 30 days. Intended supply: 30 days. 1 tab 3-4 times a day prn 2/5/20 3/6/20 Yes VERONIKA Haines CNP   ranitidine (ZANTAC) 150 MG tablet TAKE 1 TAB BY MOUTH TWICE A DAY ( AM AND BEDTIME ) 1/24/20  Yes Bartolome Fabian MD   gabapentin (NEURONTIN) 300 MG capsule Take 1 capsule by mouth 3 times daily for 30 days.  1/3/20 2/2/20 Yes VERONIKA Horvath CNP   aspirin 81 MG chewable tablet Take 1 tablet by mouth daily 12/3/19  Yes Bartolome Fabian MD   atorvastatin (LIPITOR) 80 MG tablet Take 1 tablet by mouth daily 12/3/19  Yes Bartolome Fabian MD   insulin glargine (LANTUS) 100 UNIT/ML injection vial Inject 40 U bid 12/3/19  Yes Bartolome Fabian MD   lidocaine (LMX) 4 % cream Apply topically every 8 hrs as needed for pain 11/11/19  Yes Bartolome Fabian MD   clopidogrel (PLAVIX) 75 MG tablet TAKE 1 TAB BY MOUTH ONCE A DAY ( AM ) -THIS MEDICINE MAY BE TAKENWITH OR WITHOUT FOOD 10/22/19  Yes Bartolome Fabian MD   ferrous sulfate 325 (65 Fe) MG tablet TAKE 1 TAB BY MOUTH EVERY MORNING 8/8/19  Yes Bartolome Fabian MD   magnesium oxide (MAG-OX) 400 MG tablet TAKE 1 TAB BY MOUTH TWICE A DAY ( AM AND BEDTIME ) 8/8/19  Yes Bartolome Fabian MD   carvedilol (COREG) 3.125 MG tablet TAKE 1 TAB BY MOUTH TWICE A DAY ( AM AND BEDTIME ) 8/8/19  Yes Bartolome Fabian MD   metFORMIN (GLUCOPHAGE) 1000 MG tablet TAKE 1 TAB BY MOUTH TWICE A DAY ( AM AND BEDTIME ) 8/8/19  Yes Bartolome Fabian MD   topiramate (TOPAMAX) 100 MG tablet Take 1 tablet by mouth nightly 7/16/19  Yes Maximino Angelucci, APRN - CNP   insulin aspart (NOVOLOG FLEXPEN) 100 UNIT/ML injection pen If <139 - No Insulin; 140-199-2 Units;200-249-4 Units;250-299-6 Units;300-349-8 Units;350-400=10 Units; Above 400-12 Units 5/13/19  Yes Rolando Ash MD   citalopram (CELEXA) 40 MG tablet TAKE 1/2  TAB BY MOUTH TWICE  A DAY 4/2/19  Yes Rolando Ash MD   pantoprazole (PROTONIX) 40 MG tablet Take 1 tablet by mouth 2 times daily 3/15/19  Yes Zaire Ratliff DO   nystatin (MYCOSTATIN) 184281 UNIT/GM powder Apply 3 times daily. 12/10/18  Yes Rolando Ash MD   furosemide (LASIX) 20 MG tablet Take 1 tablet by mouth daily as needed (weight gain 3 lbs overnight) 11/16/18  Yes Howard Osuna MD   losartan (COZAAR) 25 MG tablet TAKE 1 TAB BY MOUTH ONCE A DAY 11/6/18  Yes Rolando Ash MD   nitroGLYCERIN (NITROSTAT) 0.4 MG SL tablet 1 under the tongue as needed for angina, may repeat q5mins for up three doses 8/28/18  Yes Historical Provider, MD   ipratropium-albuterol (DUONEB) 0.5-2.5 (3) MG/3ML SOLN nebulizer solution Inhale 3 mLs into the lungs every 4 hours 2/6/18  Yes Fahad Jauregui MD   Melatonin 10 MG TABS Take 10 mg by mouth nightly 10/19/17  Yes Rolando Ash MD   docusate sodium (COLACE) 100 MG capsule Take 1 capsule by mouth 2 times daily Please use while taking Percocet 9/10/15  Yes Niki Pickett MD   SYMBICORT 160-4.5 MCG/ACT AERO   2 puffs As needed for sob 5/28/15  Yes Historical Provider, MD   naloxone 4 MG/0.1ML LIQD nasal spray 1 spray by Nasal route as needed for Opioid Reversal 10/10/19   Tyree Adair MD   Lift Chair [id number] [facility] by Does not apply route 9/24/19   Rolando Ash MD   Misc.  Devices (ADJUST BATH/SHOWER SEAT/BACK) MISC Use daily for shower 9/24/19   Rolando Ash MD   naloxone 4 MG/0.1ML LIQD nasal spray 1 spray by Nasal route as needed for Opioid Reversal 9/13/19   Tyree Adair MD   ULTICARE MINI PEN NEEDLES 31G X 6 MM MISC USE AS DIRECTED 8/8/19   Rolando Ash MD   TRUE METRIX BLOOD GLUCOSE TEST strip THREE TIMES A DAY 12/13/18   Rolando Ash MD Alcohol Swabs (B-D SINGLE USE SWABS REGULAR) PADS THREE TIMES A DAY 12/12/18   Susy Bell MD   Lift Chair [address] [facility] by Does not apply route Use daily at home to help with ADL's 11/6/18   Susy Bell MD   Blood Glucose Monitoring Suppl HARMEET Use daily to check BS 3/27/18   Susy Bell MD   Compression Stockings MISC by Does not apply route Pressure between 20 - 25 . 9/11/17   Susy Bell MD   OXYGEN Inhale 3 L into the lungs     Historical Provider, MD     Assessment   Pt has a home cpap  Pt states she takes Symbicort daily and neb treatments when needed at home  Pt states she was a previous smoker of 41 years and quit in 2000    RR 16  Breath Sounds: diminished expiratory wheeze      Bronchodilator assessment at level  3  Hyperinflation assessment at level   Secretion Management assessment at level      []    Bronchodilator Assessment  BRONCHODILATOR ASSESSMENT SCORE  Score 0 1 2 3 4 5   Breath Sounds   []  Patient Baseline []  No Wheeze good aeration []  Faint, scattered wheezing, good aeration [x]  Expiratory Wheezing and or moderately diminished []  Insp/Exp wheeze and/or very diminished []  Insp/Exp and/ or marked distress   Respiratory Rate   []  Patient Baseline [x]  Less than 20 []  Less than 20 []  20-25 []  Greater than 25 []  Greater than 25   Peak flow % of Pred or PB [x]  NA   []  Greater than 90%  []  81-90% []  71-80% []  Less than or equal to 70%  or unable to perform []  Unable due to Respiratory Distress   Dyspnea re [x]  Patient Baseline []  No SOB []  No SOB []  SOB on exertion []  SOB min activity []  At rest/acute   e FEV% Predicted       [x]  NA []  Above 69%  []  Unable []  Above 60-69%  []  Unable []  Above 50-59%  []  Unable []  Above 35-49%  []  Unable []  Less than 35%  []  Unable                 []  Hyperinflation Assessment  Score 1 2 3   CXR and Breath Sounds   []  Clear []  No atelectasis  Basilar aeration []  Atelectasis or absent basilar breath sounds   Incentive Spirometry Volume  (Per IBW)   []  Greater than or equal to 15ml/Kg []  less than 15ml/Kg []  less than 15ml/Kg   Surgery within last 2 weeks []  None or general   []  Abdominal or thoracic surgery  []  Abdominal or thoracic   Chronic Pulmonary Historyre []  No []  Yes []  Yes     []  Secretion Management Assessment  Score 1 2 3   Bilateral Breath Sounds   []  Occasional Rhonchi []  Scattered Rhonchi []  Course Rhonchi and/or poor aeration   Sputum    []  Small amount of thin secretions []  Moderate amount of viscous secretions []  Copius, Viscious Yellow/ Secretions   CXR as reported by physician []  clear  []  Unavailable []  Infiltrates and/or consolidation  []  Unavailable []  Mucus Plugging and or lobar consolidation  []  Unavailable   Cough []  Strong, productive cough []  Weak productive cough []  No cough or weak non-productive cough   Calista Collins  10:23 PM                            FEMALE                                  MALE                            FEV1 Predicted Normal Values                        FEV1 Predicted Normal Values          Age                                     Height in Feet and Inches       Age                                     Height in Feet and Inches       4' 11\" 5' 1\" 5' 3\" 5' 5\" 5' 7\" 5' 9\" 5' 11\" 6' 1\"  4' 11\" 5' 1\" 5' 3\" 5' 5\" 5' 7\" 5' 9\" 5' 11\" 6' 1\"   42 - 45 2.49 2.66 2.84 3.03 3.22 3.42 3.62 3.83 42 - 45 2.82 3.03 3.26 3.49 3.72 3.96 4.22 4.47   46 - 49 2.40 2.57 2.76 2.94 3.14 3.33 3.54 3.75 46 - 49 2.70 2.92 3.14 3.37 3.61 3.85 4.10 4.36   50 - 53 2.31 2.48 2.66 2.85 3.04 3.24 3.45 3.66 50 - 53 2.58 2.80 3.02 3.25 3.49 3.73 3.98 4.24   54 - 57 2.21 2.38 2.57 2.75 2.95 3.14 3.35 3.56 54 - 57 2.46 2.67 2.89 3.12 3.36 3.60 3.85 4.11   58 - 61 2.10 2.28 2.46 2.65 2.84 3.04 3.24 3.45 58 - 61 2.32 2.54 2.76 2.99 3.23 3.47 3.72 3.98   62 - 65 1.99 2.17 2.35 2.54 2.73 2.93 3.13 3.34 62 - 65 2.19 2.40 2.62 2.85 3.09 3.33 3.58 3.84   66 - 69 1.88 2.05 2.23 2.42 2.61 2.81 3.02 3.23 66 - 69 2.04 2.26 2.48 2.71 2.95 3.19 3.44 3.70   70+ 1.82 1.99 2.17 2.36 2.55 2.75 2.95 3.16 70+ 1.97 2.19 2.41 2.64 2.87 3.12 3.37 3.62             Predicted Peak Expiratory Flow Rate                                       Height (in)  Female       Height (in) Male           Age 64 63 56 57 57 79 76 71 Age            21 344 174 816 845 084 632 955 047  04 00 32 80 91 49 91 01 76   25 337 352 366 381 396 411 426 441 25 447 476 505 533 562 591 [phone]   30 329 344 359 374 389 404 419 434 30 437 466 494 523 552 [phone] 667   35 322 337 351 366 381 396 411 426 35 426 455 484 512 541 [phone]   40 314 329 344 359 374 389 404 419 40 416 445 473 [phone] 617 647   45 307 322 336 351 366 381 396 411 45 405 434 463 491 520 549 577 606 636   50 299 314 329 344 359 374 389 404 50 395 424 452 481 510 538 567 596 625   55 292 307 321 336 351 366 381 396 55 384 413 442 470 499 528 556 585 615   60 284 299 314 329 344 359 374 389 60 374 403 431 460 489 517 546 575 605   65 277 292 306 321 336 351 366 381 65 363 392 421 449 478 507 535 564 594   70 269 284 299 314 329 344 359 374 70 353 382 410 439 468 496 525 554 583   75 261 274 289 305 319 334 348 364 75 344 372 400 429 458 487 515 544 573   80 253 266 282 296 312 327 342 356 80 335 362 390 419 448 476 [phone]

## 2020-02-03 NOTE — PROGRESS NOTES
Physical Therapy    Facility/Department: [address] MED SURG  Initial Assessment    NAME: Rodrick Maurer  : 1950  MRN: [id number]    Date of Service: 2/3/2020    Discharge Recommendations:    No further therapy required at discharge. Assessment   Body structures, Functions, Activity limitations: Decreased functional mobility ; Decreased endurance;Decreased strength  Assessment: The pt ambulated 25 ft with a RW and states that she felt like she just ran around the block. Will continue to follow in PT until DC  Prognosis: Good  Decision Making: Medium Complexity  PT Education: Goals;Plan of Care;PT Role  REQUIRES PT FOLLOW UP: Yes  Activity Tolerance  Activity Tolerance: Patient limited by fatigue       Patient Diagnosis(es): There were no encounter diagnoses.    has a past medical history of Allergic rhinitis, Anxiety, Arthritis, Asthma, Atrial fibrillation (HCC), Back pain, Benign hypertension with CKD (chronic kidney disease) stage III ([address] CAD (coronary artery disease), Caffeine use, CHF (congestive heart failure) ([address] Chronic kidney disease, CKD (chronic kidney disease) stage 3, GFR 30-59 ml/min ([facility]), COPD (chronic obstructive pulmonary disease) ([address] Degeneration of lumbar or lumbosacral intervertebral disc, Depression, DM (diabetes mellitus) ([address] Emphysema of lung ([address] Gastritis, GERD (gastroesophageal reflux disease), Hearing loss, Hematuria, Hiatal hernia, HTN (hypertension), benign, Hypertriglyceridemia, Incontinence of urine, Knee arthropathy, Lumbosacral spondylosis without myelopathy, Migraine headache, Mumps, Neuropathy, Obesity, On home oxygen therapy, HIGINIO on CPAP, Otitis media, Secondary diabetes mellitus with stage 3 chronic kidney disease (GFR 30-59) ([address] Stroke ([address] Tinnitus, Type 2 diabetes mellitus without complication ([address] Type II or unspecified type diabetes mellitus without mention of complication, not stated as uncontrolled, UTI (lower urinary tract infection), and Varicose vein. has a past surgical history that includes Cholecystectomy (2007); Carpal tunnel release (Right); Tympanoplasty; Dilation & curettage (1979); Cystocopy (9/25/2013); Cataract removal with implant (Bilateral); Tonsillectomy; Incontinence surgery; ablation of dysrhythmic focus (2000); Upper gastrointestinal endoscopy (03/2016); eye surgery; Tubal ligation; other surgical history (09/10/15); Insertable Cardiac Monitor (12/04/2015); Tympanoplasty; Colonoscopy; Colonoscopy (04/10/2017); pr colsc flx w/removal lesion by hot bx forceps (N/A, 4/10/2017); and Tympanostomy tube placement (08/21/2017). Restrictions  Position Activity Restriction  Other position/activity restrictions:  Up with assist  Vision/Hearing  Vision: Impaired  Vision Exceptions: Wears glasses at all times  Hearing: Exceptions to [facility]  Hearing Exceptions: Bilateral hearing aid     Subjective  General  Patient assessed for rehabilitation services?: Yes  Response To Previous Treatment: Not applicable  Family / Caregiver Present: No  Follows Commands: Within Functional Limits  Pain Screening  Patient Currently in Pain: Yes  Pain Assessment  Pain Assessment: 0-10  Pain Level: 6  Pain Location: Chest  Vital Signs  Patient Currently in Pain: Yes  Orientation  Orientation  Overall Orientation Status: Within Normal Limits  Social/Functional History  Social/Functional History  Lives With: Alone  Type of Home: Apartment  Home Layout: One level(8th fl apt)  Home Access: Elevator  Bathroom Shower/Tub: Shower chair with back  Bathroom Toilet: Standard  Bathroom Equipment: Grab bars in [address] [place]: Electric scooter, [address], Cane, Oxygen, Alert Button, 4 wheeled walker  Receives Help From: Family, Friend(s), Home health  ADL Assistance: Needs assistance  Homemaking Assistance: Needs assistance  Homemaking Responsibilities: Yes  Ambulation Assistance: Independent  Transfer Assistance: Independent  Active : No  Mode of Transportation: Cab  Occupation: Retired  Leisure & Hobbies: Likes to play cards  Additional Comments: aide 3x wk x 3 hrs  Cognition      Objective    AROM RLE (degrees)  RLE AROM: WNL  AROM LLE (degrees)  LLE AROM : WNL  AROM RUE (degrees)  RUE AROM : WNL  AROM LUE (degrees)  LUE AROM : WNL  Strength RLE  Strength RLE: WFL  Strength LLE  Strength LLE: WFL  Strength RUE  Strength RUE: WFL  Strength LUE  Strength LUE: WFL     Sensation  Overall Sensation Status: Impaired(Reports N/T  B hands and feet)  Bed mobility  Supine to Sit: Contact guard assistance  Sit to Supine: Contact guard assistance  Scooting: Minimal assistance  Transfers  Sit to Stand: Contact guard assistance  Stand to sit: Contact guard assistance  Ambulation  Ambulation?: Yes  Ambulation 1  Surface: level tile  Device: Rolling Walker  Assistance: Contact guard assistance  Distance: amb 25 ft with a RW x CGA       Balance  Posture: Good  Sitting - Static: Good  Sitting - Dynamic: Fair  Standing - Static: Fair  Standing - Dynamic: [address]  Times per week: 5-6x wk  Current Treatment Recommendations: Strengthening, Endurance Training, Gait Training, Functional Mobility Training, Stair training, Safety Education & Training  Safety Devices  Type of devices: Left in bed, Nurse notified, Call light within reach    G-Code     OutComes Score     AM-PAC Score  AM-PAC Inpatient Mobility Raw Score : 19 (02/03/20 1412)  AM-PAC Inpatient T-Scale Score : 45.44 (02/03/20 1412)  Mobility Inpatient CMS 0-100% Score: 41.77 (02/03/20 1412)  Mobility Inpatient CMS G-Code Modifier : CK (02/03/20 1412)     Goals  Short term goals  Time Frame for Short term goals: 10 visits  Short term goal 1: transfers with SBA  Short term goal 2: amb 125 ft with a RW x SBA  Short term goal 3: ascend/descend 4 steps x SBA  Short term goal 4: exercise program x SBA  Patient Goals   Patient goals : Return home       Therapy Time   Individual Concurrent Group Co-treatment   Time

## 2020-02-03 NOTE — ED NOTES
Pt resting on cot with NAD noted. Pt resting with eyes closed, lights dimmed. Awaiting room to be cleaned.       Blanca Apple RN  02/02/20 1950

## 2020-02-03 NOTE — CONSULTS
MOUTH TWICE A DAY ( AM AND BEDTIME ) 8/8/19  Yes Veronica Senior MD   carvedilol (COREG) 3.125 MG tablet TAKE 1 TAB BY MOUTH TWICE A DAY ( AM AND BEDTIME ) 8/8/19  Yes Veronica Senior MD   metFORMIN (GLUCOPHAGE) 1000 MG tablet TAKE 1 TAB BY MOUTH TWICE A DAY ( AM AND BEDTIME ) 8/8/19  Yes Veronica Senior MD   topiramate (TOPAMAX) 100 MG tablet Take 1 tablet by mouth nightly 7/16/19  Yes VERONIKA Bragg - CNP   insulin aspart (NOVOLOG FLEXPEN) 100 UNIT/ML injection pen If <139 - No Insulin; 140-199-2 Units;200-249-4 Units;250-299-6 Units;300-349-8 Units;350-400=10 Units; Above 400-12 Units 5/13/19  Yes Veronica Senior MD   citalopram (CELEXA) 40 MG tablet TAKE 1/2  TAB BY MOUTH TWICE  A DAY 4/2/19  Yes Veronica Senior MD   pantoprazole (PROTONIX) 40 MG tablet Take 1 tablet by mouth 2 times daily 3/15/19  Yes Janis Colin,    nystatin (MYCOSTATIN) 600217 UNIT/GM powder Apply 3 times daily.  12/10/18  Yes Veronica Senior MD   furosemide (LASIX) 20 MG tablet Take 1 tablet by mouth daily as needed (weight gain 3 lbs overnight) 11/16/18  Yes Rose Mary Henry MD   losartan (COZAAR) 25 MG tablet TAKE 1 TAB BY MOUTH ONCE A DAY 11/6/18  Yes Veronica Senior MD   nitroGLYCERIN (NITROSTAT) 0.4 MG SL tablet 1 under the tongue as needed for angina, may repeat q5mins for up three doses 8/28/18  Yes Nick Mcknight MD   ipratropium-albuterol (DUONEB) 0.5-2.5 (3) MG/3ML SOLN nebulizer solution Inhale 3 mLs into the lungs every 4 hours 2/6/18  Yes Tristen Orr MD   Melatonin 10 MG TABS Take 10 mg by mouth nightly 10/19/17  Yes Veronica Senior MD   docusate sodium (COLACE) 100 MG capsule Take 1 capsule by mouth 2 times daily Please use while taking Percocet 9/10/15  Yes Emerson Estrada MD   SYMBICORT 160-4.5 MCG/ACT AERO   2 puffs As needed for sob 5/28/15  Yes Historical Provider, MD   naloxone 4 MG/0.1ML LIQD nasal spray 1 spray by Nasal route as needed for Opioid Reversal 10/10/19   Kamala Turner MD   Lift Chair MISC by Does not apply route 9/24/19   Tasia Mendosa MD   Misc. Devices (ADJUST BATH/SHOWER SEAT/BACK) MISC Use daily for shower 9/24/19   Tasia Mendosa MD   naloxone 4 MG/0.1ML LIQD nasal spray 1 spray by Nasal route as needed for Opioid Reversal 9/13/19   Vik Ray MD   ULTICARE MINI PEN NEEDLES 31G X 6 MM MISC USE AS DIRECTED 8/8/19   Tasia Mendosa MD   TRUE METRIX BLOOD GLUCOSE TEST strip THREE TIMES A DAY 12/13/18   Tasia Mendosa MD   Alcohol Swabs (B-D SINGLE USE SWABS REGULAR) PADS THREE TIMES A DAY 12/12/18   Tasia Mendosa MD   Lift Chair [address] [facility] by Does not apply route Use daily at home to help with ADL's 11/6/18   Tasia Mendosa MD   Blood Glucose Monitoring Suppl HARMEET Use daily to check BS 3/27/18   Tasia Mendosa MD   Compression Stockings MISC by Does not apply route Pressure between 20 - 25 . 9/11/17   Tasia Mendosa MD   OXYGEN Inhale 3 L into the lungs     Historical Provider, MD       Allergies:  Robitussin [guaifenesin]; Codeine; Compazine [prochlorperazine maleate]; Iodides; Morphine; Moxifloxacin; Reglan [metoclopramide]; Avelox [moxifloxacin hcl in nacl]; Cipro xr; and Sulfa antibiotics    Social History:   reports that she quit smoking about 20 years ago. Her smoking use included cigarettes. She has a 123.00 pack-year smoking history. She has never used smokeless tobacco. She reports that she does not drink alcohol or use drugs. Family History:   Negative for early CAD    REVIEW OF SYSTEMS:    · Constitutional: there has been no unanticipated weight loss. There's been No change in energy level, No change in activity level. · Eyes: No visual changes or diplopia. No scleral icterus. · ENT: No Headaches, hearing loss or vertigo. No mouth sores or sore throat. · Cardiovascular: As HPI  · Respiratory: As HPI  · Gastrointestinal: No abdominal pain, appetite loss, blood in stools. No change in bowel or bladder habits.   · Genitourinary: No dysuria, trouble voiding, or primary cardiologist in 1 to 2 weeks    Discussed with patient and nursing. Thank you for allowing me to participate in the care of this patient, please do not hesitate to call if you have any questions. Dary Myrick DO, [facility] [place] [facility]  [facility]. [facility]  [phone]

## 2020-02-03 NOTE — DISCHARGE INSTR - COC
Continuity of Care Form    Patient Name: Jess Macedo   :  1950  MRN:  [id number]    Admit date:  2020  Discharge date:  2/3/2020    Code Status Order: Full Code   Advance Directives:   [facility] Documentation     Date/Time Healthcare Directive Type of Healthcare Directive Copy in [address] [place] [address] [address] Agent's Name Healthcare Agent's Phone Number    [phone]  No, patient does not have an advance directive for healthcare treatment -- -- -- -- --          Admitting Physician:  Romie Navarro MD  PCP: Osvaldo Moeller MD    Discharging Nurse: [facility] Unit/Room#: 3265/3386-44  Discharging Unit Phone Number: [phone]    Emergency Contact:   Extended Emergency Contact Information  Primary Emergency Contact: Aden Kirkland  Address: Rajesh Galvin [address] Phone: [phone]  Mobile Phone: [phone]  Relation: Child    Past Surgical History:  Past Surgical History:   Procedure Laterality Date    ABLATION OF DYSRHYTHMIC FOCUS      CARPAL TUNNEL RELEASE Right     CATARACT REMOVAL WITH IMPLANT Bilateral     CHOLECYSTECTOMY  2007    COLONOSCOPY      COLONOSCOPY  04/10/2017    tubular adenomo polyp , mod. sigmoid diverticulosis, min. int. hemorrhoids    CYSTOSCOPY  2013    DILATION AND CURETTAGE  1979    EYE SURGERY      laser    INCONTINENCE SURGERY      Percutaneous nerve stimulation Dr Wing Wheat 2013     INSERTABLE CARDIAC MONITOR  2015    [employer] REVEAL LINQ MODEL #[id number] MRI CONDTIONAL OK 3T.  IMMEDIATELY POST IMPLANT    OTHER SURGICAL HISTORY  09/10/15    removal of interstim    DE COLSC FLX W/REMOVAL LESION BY HOT BX FORCEPS N/A 4/10/2017    COLONOSCOPY POLYPECTOMY HOT BIOPSY performed by Kelley Villanueva MD at [address] TYMPANOPLASTY      TYMPANOPLASTY      TYMPANOSTOMY TUBE PLACEMENT  2017    UPPER GASTROINTESTINAL ENDOSCOPY  2016    Dr Chase Adler

## 2020-02-03 NOTE — PROGRESS NOTES
[facility]  Documentation Note                Admission Dx: Chest pain [R07.9]    Past Medical History:   has a past medical history of Allergic rhinitis, Anxiety, Arthritis, Asthma, Atrial fibrillation ([facility] Back pain, Benign hypertension with CKD (chronic kidney disease) stage III ([address] CAD (coronary artery disease), Caffeine use, CHF (congestive heart failure) ([address] Chronic kidney disease, CKD (chronic kidney disease) stage 3, GFR 30-59 ml/min ([facility]), COPD (chronic obstructive pulmonary disease) ([address] Degeneration of lumbar or lumbosacral intervertebral disc, Depression, DM (diabetes mellitus) ([facility] [address] Emphysema of lung ([facility] [address] Gastritis, GERD (gastroesophageal reflux disease), Hearing loss, Hematuria, Hiatal hernia, HTN (hypertension), benign, Hypertriglyceridemia, Incontinence of urine, Knee arthropathy, Lumbosacral spondylosis without myelopathy, Migraine headache, Mumps, Neuropathy, Obesity, On home oxygen therapy, HIGINIO on CPAP, Otitis media, Secondary diabetes mellitus with stage 3 chronic kidney disease (GFR 30-59) ([facility] [address] Stroke ([facility] Tinnitus, Type 2 diabetes mellitus without complication ([facility] [address] Type II or unspecified type diabetes mellitus without mention of complication, not stated as uncontrolled, UTI (lower urinary tract infection), and Varicose vein. Previous Testing:     ECHO 6/15/19: EF 55%, trivial PI/TR. STRESS 6/14/19: No fixed/reversible perfusion defects. EF 76%. CATH 2012: 60% small 1st diagonal branch disease of LAD on cardiac catheterization 2012, Normal LVEF 70%    Previous office/hospital visit:   Dr. Abdelrahman Ruiz 1/22/18:   1. Non cardiac chest pain - possible costochondritis. This is not an ACS event. From a cardiac standpoint no further work up needs to be done, she can have routine outpatient follow up. Please call as needed. Home Medications:      Prior to Admission medications    Medication Sig Start Date End Date Taking?  Authorizing Provider oxyCODONE-acetaminophen (PERCOCET) 5-325 MG per tablet Take 1 tablet by mouth See Admin Instructions for 30 days. Intended supply: 30 days. 1 tab 3-4 times a day prn 2/5/20 3/6/20 Yes VERONIKA Haines CNP   ranitidine (ZANTAC) 150 MG tablet TAKE 1 TAB BY MOUTH TWICE A DAY ( AM AND BEDTIME ) 1/24/20  Yes Chester Bee MD   gabapentin (NEURONTIN) 300 MG capsule Take 1 capsule by mouth 3 times daily for 30 days. 1/3/20 2/2/20 Yes [place] VERONIKA Cardoso CNP   aspirin 81 MG chewable tablet Take 1 tablet by mouth daily 12/3/19  Yes Chester Bee MD   atorvastatin (LIPITOR) 80 MG tablet Take 1 tablet by mouth daily 12/3/19  Yes Chester Bee MD   insulin glargine (LANTUS) 100 UNIT/ML injection vial Inject 40 U bid 12/3/19  Yes Chester Bee MD   lidocaine (LMX) 4 % cream Apply topically every 8 hrs as needed for pain 11/11/19  Yes Chester Bee MD   clopidogrel (PLAVIX) 75 MG tablet TAKE 1 TAB BY MOUTH ONCE A DAY ( AM ) -THIS MEDICINE MAY BE TAKENWITH OR WITHOUT FOOD 10/22/19  Yes Chester Bee MD   ferrous sulfate 325 (65 Fe) MG tablet TAKE 1 TAB BY MOUTH EVERY MORNING 8/8/19  Yes Chester Bee MD   magnesium oxide (MAG-OX) 400 MG tablet TAKE 1 TAB BY MOUTH TWICE A DAY ( AM AND BEDTIME ) 8/8/19  Yes Chester Bee MD   carvedilol (COREG) 3.125 MG tablet TAKE 1 TAB BY MOUTH TWICE A DAY ( AM AND BEDTIME ) 8/8/19  Yes Chester Bee MD   metFORMIN (GLUCOPHAGE) 1000 MG tablet TAKE 1 TAB BY MOUTH TWICE A DAY ( AM AND BEDTIME ) 8/8/19  Yes Chester Bee MD   topiramate (TOPAMAX) 100 MG tablet Take 1 tablet by mouth nightly 7/16/19  Yes VERONIKA Barbosa CNP   insulin aspart (NOVOLOG FLEXPEN) 100 UNIT/ML injection pen If <139 - No Insulin; 140-199-2 Units;200-249-4 Units;250-299-6 Units;300-349-8 Units;350-400=10 Units; Above 400-12 Units 5/13/19  Yes Chester Bee MD   citalopram (CELEXA) 40 MG tablet TAKE 1/2  TAB BY MOUTH TWICE  A DAY 4/2/19  Yes Chester Bee MD   pantoprazole ([address]) 40 MG tablet Take 1 tablet by mouth 2 times daily 3/15/19  Yes Ubaldo Mcgill DO   nystatin (MYCOSTATIN) 427226 UNIT/GM powder Apply 3 times daily. 12/10/18  Yes Rachel Moreland MD   furosemide (LASIX) 20 MG tablet Take 1 tablet by mouth daily as needed (weight gain 3 lbs overnight) 11/16/18  Yes Michael Dorsey MD   losartan (COZAAR) 25 MG tablet TAKE 1 TAB BY MOUTH ONCE A DAY 11/6/18  Yes Rachel Moreland MD   nitroGLYCERIN (NITROSTAT) 0.4 MG SL tablet 1 under the tongue as needed for angina, may repeat q5mins for up three doses 8/28/18  Yes Historical Provider, MD   ipratropium-albuterol (DUONEB) 0.5-2.5 (3) MG/3ML SOLN nebulizer solution Inhale 3 mLs into the lungs every 4 hours 2/6/18  Yes Cielo Gomes MD   Melatonin 10 MG TABS Take 10 mg by mouth nightly 10/19/17  Yes Rachel Moreland MD   docusate sodium (COLACE) 100 MG capsule Take 1 capsule by mouth 2 times daily Please use while taking Percocet 9/10/15  Yes Nedra Moss MD   SYMBICORT 160-4.5 MCG/ACT AERO   2 puffs As needed for sob 5/28/15  Yes Historical Provider, MD   naloxone 4 MG/0.1ML LIQD nasal spray 1 spray by Nasal route as needed for Opioid Reversal 10/10/19   Neena Baron MD   Lift Chair [id number] [facility] by Does not apply route 9/24/19   Rachel Moreland MD   Misc.  Devices (ADJUST BATH/SHOWER SEAT/BACK) MISC Use daily for shower 9/24/19   Rachel Moreland MD   naloxone 4 MG/0.1ML LIQD nasal spray 1 spray by Nasal route as needed for Opioid Reversal 9/13/19   Neena Baron MD   ULTICARE MINI PEN NEEDLES 31G X 6 MM MISC USE AS DIRECTED 8/8/19   Rachel Moreland MD   TRUE METRIX BLOOD GLUCOSE TEST strip THREE TIMES A DAY 12/13/18   Rachel Moreladn MD   Alcohol Swabs (B-D SINGLE USE SWABS REGULAR) PADS THREE TIMES A DAY 12/12/18   Rachel Moreland MD   Lift Chair [id number] [facility] by Does not apply route Use daily at home to help with ADL's 11/6/18   Rachel Moreland MD   Blood Glucose Monitoring Suppl HARMEET Use daily to check BS 3/27/18   Rachel Moreland MD

## 2020-02-03 NOTE — PLAN OF CARE
BRONCHOSPASM/BRONCHOCONSTRICTION     [x]         IMPROVE AERATION/BREATH SOUNDS  [x]   ADMINISTER BRONCHODILATOR THERAPY AS APPROPRIATE  [x]   ASSESS BREATH SOUNDS  [x]   IMPLEMENT AEROSOL/MDI PROTOCOL  [x]   PATIENT EDUCATION AS NEEDED     NONINVASIVE VENTILATION    PROVIDE OPTIMAL VENTILATION/ACCEPTABLE SPO2   IMPLEMENT NONINVASIVE VENTILATION PROTOCOL   MAINTAIN ACCEPTABLE SPO2   ASSESS SKIN INTEGRITY/BREAKDOWN SCORE   PATIENT EDUCATION AS NEEDED   BIPAP AS NEEDED

## 2020-02-03 NOTE — PROGRESS NOTES
Patient c/o chest pain 8 of 10. Vitals stable and nitro 0.4 mg given. At 17:12  Vitals per Epic. Pain still 7-8 out of 10. Another nitro given. . Patient  Vitals stable. Dr. Sandra Shea up to evaluate patient.

## 2020-02-04 ENCOUNTER — APPOINTMENT (OUTPATIENT)
Dept: NUCLEAR MEDICINE | Age: 70
End: 2020-02-04
Payer: COMMERCIAL

## 2020-02-04 VITALS
RESPIRATION RATE: 18 BRPM | HEART RATE: 66 BPM | OXYGEN SATURATION: 95 % | WEIGHT: 255 LBS | DIASTOLIC BLOOD PRESSURE: 55 MMHG | HEIGHT: 62 IN | SYSTOLIC BLOOD PRESSURE: 119 MMHG | TEMPERATURE: 97.9 F | BODY MASS INDEX: 46.93 KG/M2

## 2020-02-04 LAB
EKG ATRIAL RATE: 65 BPM
EKG ATRIAL RATE: 65 BPM
EKG P AXIS: 51 DEGREES
EKG P AXIS: 73 DEGREES
EKG P-R INTERVAL: 136 MS
EKG P-R INTERVAL: 148 MS
EKG Q-T INTERVAL: 390 MS
EKG Q-T INTERVAL: 420 MS
EKG QRS DURATION: 66 MS
EKG QRS DURATION: 68 MS
EKG QTC CALCULATION (BAZETT): 405 MS
EKG QTC CALCULATION (BAZETT): 436 MS
EKG R AXIS: -30 DEGREES
EKG R AXIS: -32 DEGREES
EKG T AXIS: 72 DEGREES
EKG T AXIS: 76 DEGREES
EKG VENTRICULAR RATE: 65 BPM
EKG VENTRICULAR RATE: 65 BPM
GLUCOSE BLD-MCNC: 161 MG/DL (ref 65–105)
GLUCOSE BLD-MCNC: 182 MG/DL (ref 65–105)
LV EF: 68 %
LVEF MODALITY: NORMAL

## 2020-02-04 PROCEDURE — 94660 CPAP INITIATION&MGMT: CPT

## 2020-02-04 PROCEDURE — 2580000003 HC RX 258: Performed by: STUDENT IN AN ORGANIZED HEALTH CARE EDUCATION/TRAINING PROGRAM

## 2020-02-04 PROCEDURE — 2580000003 HC RX 258: Performed by: EMERGENCY MEDICINE

## 2020-02-04 PROCEDURE — A9500 TC99M SESTAMIBI: HCPCS | Performed by: EMERGENCY MEDICINE

## 2020-02-04 PROCEDURE — 6370000000 HC RX 637 (ALT 250 FOR IP): Performed by: EMERGENCY MEDICINE

## 2020-02-04 PROCEDURE — 3430000000 HC RX DIAGNOSTIC RADIOPHARMACEUTICAL: Performed by: EMERGENCY MEDICINE

## 2020-02-04 PROCEDURE — 2700000000 HC OXYGEN THERAPY PER DAY

## 2020-02-04 PROCEDURE — 93010 ELECTROCARDIOGRAM REPORT: CPT | Performed by: INTERNAL MEDICINE

## 2020-02-04 PROCEDURE — 6360000002 HC RX W HCPCS: Performed by: EMERGENCY MEDICINE

## 2020-02-04 PROCEDURE — 78452 HT MUSCLE IMAGE SPECT MULT: CPT

## 2020-02-04 PROCEDURE — 93017 CV STRESS TEST TRACING ONLY: CPT

## 2020-02-04 PROCEDURE — 6370000000 HC RX 637 (ALT 250 FOR IP): Performed by: STUDENT IN AN ORGANIZED HEALTH CARE EDUCATION/TRAINING PROGRAM

## 2020-02-04 PROCEDURE — G0378 HOSPITAL OBSERVATION PER HR: HCPCS

## 2020-02-04 PROCEDURE — 94640 AIRWAY INHALATION TREATMENT: CPT

## 2020-02-04 PROCEDURE — 82947 ASSAY GLUCOSE BLOOD QUANT: CPT

## 2020-02-04 RX ORDER — METOPROLOL TARTRATE 5 MG/5ML
5 INJECTION INTRAVENOUS EVERY 5 MIN PRN
Status: DISCONTINUED | OUTPATIENT
Start: 2020-02-04 | End: 2020-02-04 | Stop reason: ALTCHOICE

## 2020-02-04 RX ORDER — SODIUM CHLORIDE 0.9 % (FLUSH) 0.9 %
10 SYRINGE (ML) INJECTION PRN
Status: DISCONTINUED | OUTPATIENT
Start: 2020-02-04 | End: 2020-02-04 | Stop reason: HOSPADM

## 2020-02-04 RX ORDER — SODIUM CHLORIDE 9 MG/ML
500 INJECTION, SOLUTION INTRAVENOUS CONTINUOUS PRN
Status: DISCONTINUED | OUTPATIENT
Start: 2020-02-04 | End: 2020-02-04 | Stop reason: ALTCHOICE

## 2020-02-04 RX ORDER — NITROGLYCERIN 0.4 MG/1
0.4 TABLET SUBLINGUAL EVERY 5 MIN PRN
Status: DISCONTINUED | OUTPATIENT
Start: 2020-02-04 | End: 2020-02-04 | Stop reason: ALTCHOICE

## 2020-02-04 RX ORDER — ALBUTEROL SULFATE 90 UG/1
2 AEROSOL, METERED RESPIRATORY (INHALATION) PRN
Status: DISCONTINUED | OUTPATIENT
Start: 2020-02-04 | End: 2020-02-04 | Stop reason: ALTCHOICE

## 2020-02-04 RX ORDER — AMINOPHYLLINE DIHYDRATE 25 MG/ML
50 INJECTION, SOLUTION INTRAVENOUS PRN
Status: DISCONTINUED | OUTPATIENT
Start: 2020-02-04 | End: 2020-02-04 | Stop reason: ALTCHOICE

## 2020-02-04 RX ORDER — ATROPINE SULFATE 0.1 MG/ML
0.5 INJECTION INTRAVENOUS EVERY 5 MIN PRN
Status: DISCONTINUED | OUTPATIENT
Start: 2020-02-04 | End: 2020-02-04 | Stop reason: ALTCHOICE

## 2020-02-04 RX ORDER — SODIUM CHLORIDE 0.9 % (FLUSH) 0.9 %
10 SYRINGE (ML) INJECTION PRN
Status: DISCONTINUED | OUTPATIENT
Start: 2020-02-04 | End: 2020-02-04 | Stop reason: ALTCHOICE

## 2020-02-04 RX ADMIN — Medication 10 ML: at 10:17

## 2020-02-04 RX ADMIN — SODIUM CHLORIDE, PRESERVATIVE FREE 10 ML: 5 INJECTION INTRAVENOUS at 12:24

## 2020-02-04 RX ADMIN — CARVEDILOL 3.12 MG: 3.12 TABLET, FILM COATED ORAL at 12:13

## 2020-02-04 RX ADMIN — TETRAKIS(2-METHOXYISOBUTYLISOCYANIDE)COPPER(I) TETRAFLUOROBORATE 40 MILLICURIE: 1 INJECTION, POWDER, LYOPHILIZED, FOR SOLUTION INTRAVENOUS at 10:17

## 2020-02-04 RX ADMIN — LOSARTAN POTASSIUM 25 MG: 25 TABLET, FILM COATED ORAL at 12:14

## 2020-02-04 RX ADMIN — INSULIN LISPRO 3 UNITS: 100 INJECTION, SOLUTION INTRAVENOUS; SUBCUTANEOUS at 12:19

## 2020-02-04 RX ADMIN — PREDNISONE 40 MG: 20 TABLET ORAL at 12:13

## 2020-02-04 RX ADMIN — MOMETASONE FUROATE AND FORMOTEROL FUMARATE DIHYDRATE 2 PUFF: 200; 5 AEROSOL RESPIRATORY (INHALATION) at 08:04

## 2020-02-04 RX ADMIN — ASPIRIN 81 MG: 81 TABLET, CHEWABLE ORAL at 12:12

## 2020-02-04 RX ADMIN — ATORVASTATIN CALCIUM 80 MG: 80 TABLET, FILM COATED ORAL at 12:13

## 2020-02-04 RX ADMIN — Medication 10 ML: at 10:11

## 2020-02-04 RX ADMIN — SODIUM CHLORIDE, PRESERVATIVE FREE 10 ML: 5 INJECTION INTRAVENOUS at 07:58

## 2020-02-04 RX ADMIN — OXYCODONE HYDROCHLORIDE AND ACETAMINOPHEN 2 TABLET: 5; 325 TABLET ORAL at 08:30

## 2020-02-04 RX ADMIN — GABAPENTIN 300 MG: 300 CAPSULE ORAL at 12:13

## 2020-02-04 RX ADMIN — SODIUM CHLORIDE, PRESERVATIVE FREE 10 ML: 5 INJECTION INTRAVENOUS at 10:17

## 2020-02-04 RX ADMIN — CLOPIDOGREL 75 MG: 75 TABLET, FILM COATED ORAL at 12:13

## 2020-02-04 RX ADMIN — IPRATROPIUM BROMIDE AND ALBUTEROL SULFATE 3 ML: .5; 3 SOLUTION RESPIRATORY (INHALATION) at 08:03

## 2020-02-04 RX ADMIN — PANTOPRAZOLE SODIUM 40 MG: 40 TABLET, DELAYED RELEASE ORAL at 12:13

## 2020-02-04 RX ADMIN — TETRAKIS(2-METHOXYISOBUTYLISOCYANIDE)COPPER(I) TETRAFLUOROBORATE 16 MILLICURIE: 1 INJECTION, POWDER, LYOPHILIZED, FOR SOLUTION INTRAVENOUS at 07:58

## 2020-02-04 RX ADMIN — CITALOPRAM 20 MG: 20 TABLET, FILM COATED ORAL at 12:13

## 2020-02-04 RX ADMIN — REGADENOSON 0.4 MG: 0.08 INJECTION, SOLUTION INTRAVENOUS at 10:17

## 2020-02-04 ASSESSMENT — PAIN SCALES - GENERAL: PAINLEVEL_OUTOF10: 7

## 2020-02-04 NOTE — PROGRESS NOTES
chewable tablet 81 mg, Daily  atorvastatin (LIPITOR) tablet 80 mg, Daily  carvedilol (COREG) tablet 3.125 mg, BID  citalopram (CELEXA) tablet 20 mg, Daily  clopidogrel (PLAVIX) tablet 75 mg, Daily  gabapentin (NEURONTIN) capsule 300 mg, TID  losartan (COZAAR) tablet 25 mg, Daily  melatonin tablet 10 mg, Nightly  nitroGLYCERIN (NITROSTAT) SL tablet 0.4 mg, Q5 Min PRN  pantoprazole (PROTONIX) tablet 40 mg, BID  topiramate (TOPAMAX) tablet 100 mg, Nightly  mometasone-formoterol (DULERA) 200-5 MCG/ACT inhaler 2 puff, BID  albuterol (PROVENTIL) nebulizer solution 2.5 mg, As Directed RT PRN  ipratropium-albuterol (DUONEB) nebulizer solution 3 mL, Q4H WA        All medication charted and reviewed. CONSULTS      IP CONSULT TO CARDIOLOGY  IP CONSULT TO HOME CARE NEEDS    ASSESSMENT/PLAN       Inna Pugh is a 78 y.o. female who presents with:    1. Acute sharp midsternal chest pain without radiation due to unclear etiology, initial encounter  a. Patient admitted for cardiology evaluation, who saw pt and advised outpatient f/u for routine cardiology preventative care, treat MSK pain  b. Echo demonstrated grade 1 left ventricular diastolic dysfunction as well as a normal ejection fraction  c. Patient continued to have anginal symptoms throughout the day, will obtain stress test today  d. DVT study negative    · Continue home medications and pain control  · Monitor vitals, labs, and imaging  · DISPO: pending consults and clinical improvement    --  Chapo Nuñez M.D., PGY-1  Emergency Medicine Resident Physician     This dictation was generated by voice recognition computer software. Although all attempts are made to edit the dictation for accuracy, there may be errors in the transcription that are not intended.

## 2020-02-04 NOTE — PROCEDURES
[address]                  [id number] [facility] [address]                              CARDIAC STRESS TEST    PATIENT NAME: Juarez Galvin                :        1950  MED REC NO:   [id number]                             ROOM:       0257  ACCOUNT NO:   [de-identified]                           ADMIT DATE: 2020  PROVIDER:     Isabel David STRESS STUDY    DATE OF STUDY:  2020  ORDERING PROVIDER:  Rudy Marie  PRIMARY CARE PROVIDER: JACKIE Brenner  INDICATION: Chest pain  CONSENT: The test was explained and consent was signed. PROTOCOL: Lexiscan,0.4 mg infused. PREINFUSION EKG: normal.  PREINFUSION HR: 64 bpm, infusion HR, 77 bpm.  HR response to Lexiscan  was normal.  PREINFUSION BP: 145/57 mmHg, infusion BP, 141/54 mmHg. BP response to  Lexiscan was appropriate. CHEST PAIN:  Chest pressure (7 of 10) with Lexiscan infusion, relieved  in recovery. LEXISCAN EKG:  Normal.  ISCHEMIC EKG CHANGES: None. IMPRESSION  Electrocardiographically negative Lexiscan stress study.   **Cardiolite report issued from the department of Nuclear Medicine**    Haylie Quiroz    D: 2020 11:31:35       T: 2020 11:32:38     GY/PGAYTAN  Job#: [id number]     Doc#: Unknown

## 2020-02-04 NOTE — PROGRESS NOTES
Physical Therapy  DATE: 2020    NAME: Jose Carlos Matos  MRN: [id number]   : 1950    Patient not seen this date for Physical Therapy due to:  [] Blood transfusion in progress  [] Hemodialysis  []  Patient Declined  [] Spine Precautions   [] Strict Bedrest  [] Surgery/ Procedure  [x] Testing (pt off the floor for stress test. Will check back in the PM as time allows)     [] Other        [] PT being discontinued at this time. Patient independent. No further needs. [] PT being discontinued at this time as the patient has been transferred to palliative care. No further needs.     Leanne Graham, PTA

## 2020-02-04 NOTE — PROGRESS NOTES
CDU Daily Progress Note  Attending Physician       Pt Name: Neha Travis  MRN: [id number]  [place] 1950  Date of evaluation: 2/4/20    I performed a history and physical examination of the patient and discussed management with the resident. I reviewed the residents note and agree with the documented findings and plan of care. Any areas of disagreement are noted on the chart. I was personally present for the key portions of any procedures. I have documented in the chart those procedures where I was not present during the key portions. I have reviewed the emergency nurses triage note. I agree with the chief complaint, past medical history, past surgical history, allergies, medications, social and family history as documented unless otherwise noted below. Documentation of the HPI, Physical Exam and Medical Decision Making performed by medical students or scribes is based on my personal performance of the HPI, PE and MDM. For Physician Assistant/ Nurse Practitioner cases/documentation I have personally evaluated this patient and have completed at least one if not all key elements of the E/M (history, physical exam, and MDM). Additional findings are as noted. The Family History, Social History and Review of Systems are unchanged from the previous day. No significant events overnight. Former smoker. The patient is diabetic. I have reviewed the patient's chart and found the most recent A1c is 8.7. This indicates reasonable control. Will continue to follow-up with PCP. Patient has Lindsey Bales completed. Awaiting nuclear medicine portion. Will await results for disposition. Patient is aware and verbalizes understanding.     Robert Viramontes MD  Attending Physician  Critical Decision Unit

## 2020-02-05 ENCOUNTER — TELEPHONE (OUTPATIENT)
Dept: FAMILY MEDICINE CLINIC | Age: 70
End: 2020-02-05

## 2020-02-05 NOTE — DISCHARGE SUMMARY
MEDICINE MAY BE TAKENWITH OR WITHOUT FOOD             Compression Stockings MISC  by Does not apply route Pressure between 20 - 25 .             docusate sodium (COLACE) 100 MG capsule  Take 1 capsule by mouth 2 times daily Please use while taking Percocet             ferrous sulfate 325 (65 Fe) MG tablet  TAKE 1 TAB BY MOUTH EVERY MORNING             furosemide (LASIX) 20 MG tablet  Take 1 tablet by mouth daily as needed (weight gain 3 lbs overnight)             gabapentin (NEURONTIN) 300 MG capsule  Take 1 capsule by mouth 3 times daily for 30 days. insulin aspart (NOVOLOG FLEXPEN) 100 UNIT/ML injection pen  If <139 - No Insulin; 140-199-2 Units;200-249-4 Units;250-299-6 Units;300-349-8 Units;350-400=10 Units; Above 400-12 Units             insulin glargine (LANTUS) 100 UNIT/ML injection vial  Inject 40 U bid             ipratropium-albuterol (DUONEB) 0.5-2.5 (3) MG/3ML SOLN nebulizer solution  Inhale 3 mLs into the lungs every 4 hours             lidocaine (LMX) 4 % cream  Apply topically every 8 hrs as needed for pain             Lift Chair MISC  by Does not apply route Use daily at home to help with ADL's             Lift Chair MISC  by Does not apply route             losartan (COZAAR) 25 MG tablet  TAKE 1 TAB BY MOUTH ONCE A DAY             magnesium oxide (MAG-OX) 400 MG tablet  TAKE 1 TAB BY MOUTH TWICE A DAY ( AM AND BEDTIME )             Melatonin 10 MG TABS  Take 10 mg by mouth nightly             metFORMIN (GLUCOPHAGE) 1000 MG tablet  TAKE 1 TAB BY MOUTH TWICE A DAY ( AM AND BEDTIME )             Misc.  Devices (ADJUST BATH/SHOWER SEAT/BACK) MISC  Use daily for shower             naloxone 4 MG/0.1ML LIQD nasal spray  1 spray by Nasal route as needed for Opioid Reversal             naloxone 4 MG/0.1ML LIQD nasal spray  1 spray by Nasal route as needed for Opioid Reversal             nitroGLYCERIN (NITROSTAT) 0.4 MG SL tablet  1 under the tongue as needed for angina, may repeat q5mins for up three doses             nystatin (MYCOSTATIN) 743538 UNIT/GM powder  Apply 3 times daily. oxyCODONE-acetaminophen (PERCOCET) 5-325 MG per tablet  Take 1 tablet by mouth See Admin Instructions for 30 days. Intended supply: 30 days.  1 tab 3-4 times a day prn             OXYGEN  Inhale 3 L into the lungs              pantoprazole (PROTONIX) 40 MG tablet  Take 1 tablet by mouth 2 times daily             predniSONE (DELTASONE) 20 MG tablet  Take 2 tablets by mouth daily for 5 days             ranitidine (ZANTAC) 150 MG tablet  TAKE 1 TAB BY MOUTH TWICE A DAY ( AM AND BEDTIME )             SYMBICORT 160-4.5 MCG/ACT AERO    2 puffs As needed for sob             topiramate (TOPAMAX) 100 MG tablet  Take 1 tablet by mouth nightly             TRUE METRIX BLOOD GLUCOSE TEST strip  THREE TIMES A DAY             [employer] MINI PEN NEEDLES 31G X 6 MM MISC  USE AS DIRECTED                 Diet:  No diet orders on file , Advance as tolerated     Activity:  As tolerated    Consultants: IP CONSULT TO CARDIOLOGY  IP CONSULT TO HOME CARE NEEDS    Procedures:  Not indicated    Diagnostic Test:   Results for orders placed or performed during the hospital encounter of 02/02/20   Troponin   Result Value Ref Range    Troponin, High Sensitivity 7 0 - 14 ng/L    Troponin T NOT REPORTED <0.03 ng/mL    Troponin Interp NOT REPORTED    Troponin   Result Value Ref Range    Troponin, High Sensitivity <6 0 - 14 ng/L    Troponin T NOT REPORTED <0.03 ng/mL    Troponin Interp NOT REPORTED    Troponin   Result Value Ref Range    Troponin, High Sensitivity 7 0 - 14 ng/L    Troponin T NOT REPORTED <0.03 ng/mL    Troponin Interp NOT REPORTED    Brain Natriuretic Peptide   Result Value Ref Range    Pro- (H) <300 pg/mL    BNP Interpretation Pro-BNP Reference Range:    CBC WITH AUTO DIFFERENTIAL   Result Value Ref Range    WBC 7.3 3.5 - 11.3 k/uL    RBC 4.20 3.95 - 5.11 m/uL    Hemoglobin 12.8 11.9 - 15.1 g/dL    Hematocrit 41.0 36.3 - 47.1 % MCV 97.6 82.6 - 102.9 fL    MCH 30.5 25.2 - 33.5 pg    MCHC 31.2 28.4 - 34.8 g/dL    RDW 12.7 11.8 - 14.4 %    Platelets 779 433 - 234 k/uL    MPV 12.1 8.1 - 13.5 fL    NRBC Automated 0.0 0.0 per 100 WBC    Differential Type NOT REPORTED     Seg Neutrophils 56 36 - 65 %    Lymphocytes 29 24 - 43 %    Monocytes 10 3 - 12 %    Eosinophils % 3 1 - 4 %    Basophils 1 0 - 2 %    Immature Granulocytes 1 (H) 0 %    Segs Absolute 4.10 1.50 - 8.10 k/uL    Absolute Lymph # 2.12 1.10 - 3.70 k/uL    Absolute Mono # 0.75 0.10 - 1.20 k/uL    Absolute Eos # 0.18 0.00 - 0.44 k/uL    Basophils Absolute 0.07 0.00 - 0.20 k/uL    Absolute Immature Granulocyte 0.05 0.00 - 0.30 k/uL    WBC Morphology NOT REPORTED     RBC Morphology NOT REPORTED     Platelet Estimate NOT REPORTED    Basic Metabolic Panel w/ Reflex to MG   Result Value Ref Range    Glucose 232 (H) 70 - 99 mg/dL    BUN 15 8 - 23 mg/dL    CREATININE 0.95 (H) 0.50 - 0.90 mg/dL    Bun/Cre Ratio NOT REPORTED 9 - 20    Calcium 8.9 8.6 - 10.4 mg/dL    Sodium 136 135 - 144 mmol/L    Potassium 4.7 3.7 - 5.3 mmol/L    Chloride 100 98 - 107 mmol/L    CO2 23 20 - 31 mmol/L    Anion Gap 13 9 - 17 mmol/L    GFR Non-African American 58 (L) >60 mL/min    GFR African American >60 >60 mL/min    GFR Comment          GFR Staging NOT REPORTED    POC Glucose Fingerstick   Result Value Ref Range    POC Glucose 143 (H) 65 - 105 mg/dL   POC Glucose Fingerstick   Result Value Ref Range    POC Glucose 166 (H) 65 - 105 mg/dL   POC Glucose Fingerstick   Result Value Ref Range    POC Glucose 307 (H) 65 - 105 mg/dL   POC Glucose Fingerstick   Result Value Ref Range    POC Glucose 321 (H) 65 - 105 mg/dL   POC Glucose Fingerstick   Result Value Ref Range    POC Glucose 365 (H) 65 - 105 mg/dL   POC Glucose Fingerstick   Result Value Ref Range    POC Glucose 182 (H) 65 - 105 mg/dL   POC Glucose Fingerstick   Result Value Ref Range    POC Glucose 161 (H) 65 - 105 mg/dL   EKG 12 Lead   Result Value Ref Range    Ventricular Rate 65 BPM    Atrial Rate 65 BPM    P-R Interval 136 ms    QRS Duration 66 ms    Q-T Interval 390 ms    QTc Calculation (Bazett) 405 ms    P Axis 51 degrees    R Axis -30 degrees    T Axis 76 degrees   EKG 12 Lead   Result Value Ref Range    Ventricular Rate 65 BPM    Atrial Rate 65 BPM    P-R Interval 148 ms    QRS Duration 68 ms    Q-T Interval 420 ms    QTc Calculation (Bazett) 436 ms    P Axis 73 degrees    R Axis -32 degrees    T Axis 72 degrees     Xr Chest Portable    Result Date: 2/2/2020  EXAMINATION: ONE XRAY VIEW OF THE CHEST 2/2/2020 4:03 pm COMPARISON: Chest January 31, 2019. HISTORY: ORDERING SYSTEM PROVIDED HISTORY: shortness of breath TECHNOLOGIST PROVIDED HISTORY: shortness of breath Reason for Exam: Upright FINDINGS: Loop recorder is noted. Cardiomegaly is unchanged. Lungs are clear. No free air. Cardiomegaly without acute cardiopulmonary process. Vl Dup Lower Extremity Venous Bilateral    Result Date: 2/3/2020    [facility]  Vascular Lower Extremities DVT Study Procedure   Patient Name  Bita Moctezuma     Date of Study         02/03/2020                Hardik Lanza   Date of Birth 1950  Gender                Female   Age           78 year(s)  Race                     Room Number   3946        Height:               62 inch, 157.48 cm   Corporate ID  [id number]    Weight:               255 pounds, 115.7 kg  #   Patient Acct  [de-identified]   BSA:       2.12 m^2   BMI:         46.64 kg/m^2  #   MR #          [id number]     Sonographer           Niecy Roe RVT, [facility]   Accession #   [id number]   Interpreting          David Magaña                            Physician   Referring                 Referring Physician   Tor Riedel, MD  Nurse  Practitioner  Procedure Type of Study:   Veins: Lower Extremities DVT Study, Venous Scan Lower Bilateral.  Indications for Study:Pain, leg. Patient Status: In Patient. Technical Quality:Limited visualization. !Yes       !Yes            ! None      ! +------------------------------------+----------+---------------+----------+ ! GSV Thigh                           ! Yes       ! Yes            ! None      ! +------------------------------------+----------+---------------+----------+ ! GSV Knee                            ! Yes       ! Yes            ! None      ! +------------------------------------+----------+---------------+----------+ ! GSV Ankle                           ! Yes       ! Yes            ! None      ! +------------------------------------+----------+---------------+----------+ ! SSV                                 ! Yes       ! Yes            ! None      ! +------------------------------------+----------+---------------+----------+ Right Doppler Measurements +---------------------------+------+------+--------------------------------+ ! Location                   ! Signal!Reflux! Reflux (msec)                   ! +---------------------------+------+------+--------------------------------+ ! Common Femoral             !Phasic!      !                                ! +---------------------------+------+------+--------------------------------+ ! Prox Femoral               !Phasic!      !                                ! +---------------------------+------+------+--------------------------------+ ! Popliteal                  !Phasic!      !                                ! +---------------------------+------+------+--------------------------------+ Left Lower Extremities DVT Study Measurements Left 2D Measurements +------------------------------------+----------+---------------+----------+ ! Location                            ! Visualized! Compressibility! Thrombosis! +------------------------------------+----------+---------------+----------+ ! Common Femoral                      !Yes       ! Yes            ! None      ! +------------------------------------+----------+---------------+----------+ ! Prox Femoral !Yes       !Yes            ! None      ! +------------------------------------+----------+---------------+----------+ ! Mid Femoral                         !Yes       ! Yes            ! None      ! +------------------------------------+----------+---------------+----------+ ! Dist Femoral                        !Yes       ! Yes            ! None      ! +------------------------------------+----------+---------------+----------+ ! Popliteal                           !Yes       ! Yes            ! None      ! +------------------------------------+----------+---------------+----------+ ! Sapheno Femoral Junction            ! Yes       ! Yes            ! None      ! +------------------------------------+----------+---------------+----------+ ! PTV                                 ! Yes       ! Yes            ! None      ! +------------------------------------+----------+---------------+----------+ ! Peroneal                            !No        !               !          ! +------------------------------------+----------+---------------+----------+ ! Gastroc                             ! Yes       ! Yes            ! None      ! +------------------------------------+----------+---------------+----------+ ! GSV Thigh                           ! Yes       ! Yes            ! None      ! +------------------------------------+----------+---------------+----------+ ! GSV Knee                            ! Yes       ! Yes            ! None      ! +------------------------------------+----------+---------------+----------+ ! GSV Ankle                           ! Yes       ! Yes            ! None      ! +------------------------------------+----------+---------------+----------+ ! SSV                                 ! Yes       ! Yes            ! None      ! +------------------------------------+----------+---------------+----------+ Left Doppler Measurements +---------------------------+------+------+--------------------------------+ ! Location !Signal!Reflux! Reflux (msec)                   ! +---------------------------+------+------+--------------------------------+ ! Common Femoral             !Phasic!      !                                ! +---------------------------+------+------+--------------------------------+ ! Prox Femoral               !Phasic!      !                                ! +---------------------------+------+------+--------------------------------+ ! Popliteal                  !Phasic!      !                                ! +---------------------------+------+------+--------------------------------+    Nm Cardiac Stress Test Nuclear Imaging    Result Date: 2/4/2020  EXAMINATION: MYOCARDIAL PERFUSION IMAGING 2/4/2020 10:11 am TECHNIQUE: For the rest study, 16 mCi of Tc 99 labeled sestamibi were injected. SPECT images were acquired. Under cardiology supervision, 0.4mg [place] was infused. After pharmacologic stress, 40 mCi of Tc 99 labeled sestamibi were injected. SPECT images with ECG gating were acquired. COMPARISON: 06/14/2019 HISTORY: ORDERING SYSTEM PROVIDED HISTORY: Angina TECHNOLOGIST PROVIDED HISTORY: Reason for Exam: Angina Procedure Type->Rx angina Reason for Exam: angina, diabetes, gerd, short of breath, chest pain, HTN, CHF AFib FINDINGS: Images interpreted utilizing [employer] and [employer]. There is normal distribution of radiotracer throughout the myocardium without evidence for a significant reversible or fixed perfusion defect. Perfusion scores are visually adjusted to account for artifact. Summed stress score:  0 Summed rest score:  0 Summed reversibility score:  0 Function: End diastolic volume:  29LC Left ventricular ejection fraction:  68% Wall motion abnormalities:  None. TID score:  1.37 (scores greater than 1.39 are considered elevated for Lexiscan stress with Tc99m)     1. No discrete perfusion abnormality to suggest myocardial ischemia/infarction. 2. No wall motion abnormality. Calculated ejection fraction of 68%. 3. Risk stratification: Low Notes concerning risk stratification: Risk stratification incorporates both clinical history and some testing results. Final risk determination is the responsibility of the ordering provider as other patient information and test results may increase or decrease the risk assessment reported for this examination. Risk stratification criteria are adapted from \"Noninvasive Risk Stratification\" criteria from Nancy Cardenas. Al, ACC/AATS/AHA/ASE/ASNC/SCAI/SCCT/STS 2017 Appropriate Use Criteria For Coronary Revascularization in Patients With Stable Ischemic Heart Disease [facility] Volume 69, Issue 17, May 2017 High risk (>3% annual death or MI) 1. Severe resting LV dysfunction (LVEF <35%) not readily explained by non coronary causes 2. Resting perfusion abnormalities greater than 10% of the myocardium in patients without prior history or evidence of MI3. Stress-induced perfusion abnormalities encumbering greater than or equal to 10% myocardium or stress segmental scores indicating multiple vascular territories with abnormalities 4. Stress-induced LV dilatation (TID ratio greater than 1.19 for exercise and greater than 1.39 for regadenoson) Intermediate risk (1% to 3% annual death or MI) 1. Mild/moderate resting LV dysfunction (LVEF 35% to 49%) not readily explained by non coronary causes. 2. Resting perfusion abnormalities in 5%-9.9% of the myocardium in patients without a history or prior evidence of MI 3. Stress-induced perfusion abnormality encumbering 5%-9.9% of the myocardium or stress segmental scores indicating 1 vascular territory with abnormalities but without LV dilation 4. Small wall motion abnormality involving 1-2 segments and only 1 coronary bed. Low Risk (Less than 1% annual death or MI) 1. Normal or small myocardial perfusion defect at rest or with stress encumbering less than 5% of the myocardium.            Physical Exam:    General appearance - NAD, AOx 3  Lungs -CTAB, no R/R/R  Heart - RRR, no M/R/G  Abdomen - Soft, NT/ND  Neurological:  MAEx4, No focal motor deficit, sensory loss  Extremities - Cap refil <2 sec in all ext., no edema  Skin -warm, dry      Hospital Course:  Clinical course has improved, labs and imaging reviewed. Jena Madsen originally presented to the hospital on 2/2/2020  3:03 PM. with acute sharp midsternal chest pain without radiation intermittent throughout the day of admission. At that time it was determined that She required further observation and cardiology evaluation. She was admitted and labs and imaging were followed daily. Imaging results as above. Cardiology saw patient and recommended MSK treatment. Due to suspicion of cardiac etiology given continued intermittent anginal symptoms throughout the day, patient received echocardiogram, stress test, DVT ultrasound to rule out blood clots which may be causing shortness of breath. These tests were negative other than echo demonstrating grade 1 left ventricular diastolic dysfunction. Patient educated on need for PCP follow-up for preventative care. Patient verbalized understanding and agreement. She is medically stable to be discharged. Disposition: Home    Patient stated that they will not drive themselves home from the hospital if they have gotten pain killers/ narcotics earlier that day and that they will arrange for transportation on their own or work with the  for a ride. Patient counseled NOT to drive while under the influence of narcotics/ pain killers. Condition: Good    Patient stable and ready for discharge home. I have discussed plan of care with patient and they are in understanding. They were instructed to read discharge paperwork. All of their questions and concerns were addressed. Time Spent: 0 day      --  Varinder Lawson M.D., PGY-1  Emergency Medicine Resident Physician     This dictation was generated by voice recognition

## 2020-02-12 ENCOUNTER — OFFICE VISIT (OUTPATIENT)
Dept: FAMILY MEDICINE CLINIC | Age: 70
End: 2020-02-12
Payer: COMMERCIAL

## 2020-02-12 ENCOUNTER — TELEPHONE (OUTPATIENT)
Dept: FAMILY MEDICINE CLINIC | Age: 70
End: 2020-02-12

## 2020-02-12 VITALS
HEART RATE: 70 BPM | WEIGHT: 256.4 LBS | OXYGEN SATURATION: 94 % | BODY MASS INDEX: 47.18 KG/M2 | HEIGHT: 62 IN | SYSTOLIC BLOOD PRESSURE: 100 MMHG | DIASTOLIC BLOOD PRESSURE: 60 MMHG

## 2020-02-12 PROBLEM — R73.9 HYPERGLYCEMIA: Status: RESOLVED | Noted: 2019-09-14 | Resolved: 2020-02-12

## 2020-02-12 PROBLEM — G45.9 TIA (TRANSIENT ISCHEMIC ATTACK): Status: RESOLVED | Noted: 2019-10-31 | Resolved: 2020-02-12

## 2020-02-12 PROBLEM — I48.0 PAROXYSMAL ATRIAL FIBRILLATION (HCC): Status: ACTIVE | Noted: 2020-02-12

## 2020-02-12 LAB — HBA1C MFR BLD: 8.4 %

## 2020-02-12 PROCEDURE — 3052F HG A1C>EQUAL 8.0%<EQUAL 9.0%: CPT | Performed by: FAMILY MEDICINE

## 2020-02-12 PROCEDURE — G8399 PT W/DXA RESULTS DOCUMENT: HCPCS | Performed by: FAMILY MEDICINE

## 2020-02-12 PROCEDURE — G8926 SPIRO NO PERF OR DOC: HCPCS | Performed by: FAMILY MEDICINE

## 2020-02-12 PROCEDURE — G8427 DOCREV CUR MEDS BY ELIG CLIN: HCPCS | Performed by: FAMILY MEDICINE

## 2020-02-12 PROCEDURE — 4040F PNEUMOC VAC/ADMIN/RCVD: CPT | Performed by: FAMILY MEDICINE

## 2020-02-12 PROCEDURE — 2022F DILAT RTA XM EVC RTNOPTHY: CPT | Performed by: FAMILY MEDICINE

## 2020-02-12 PROCEDURE — 1036F TOBACCO NON-USER: CPT | Performed by: FAMILY MEDICINE

## 2020-02-12 PROCEDURE — 3017F COLORECTAL CA SCREEN DOC REV: CPT | Performed by: FAMILY MEDICINE

## 2020-02-12 PROCEDURE — 1123F ACP DISCUSS/DSCN MKR DOCD: CPT | Performed by: FAMILY MEDICINE

## 2020-02-12 PROCEDURE — 99214 OFFICE O/P EST MOD 30 MIN: CPT | Performed by: FAMILY MEDICINE

## 2020-02-12 PROCEDURE — 1111F DSCHRG MED/CURRENT MED MERGE: CPT | Performed by: FAMILY MEDICINE

## 2020-02-12 PROCEDURE — G8417 CALC BMI ABV UP PARAM F/U: HCPCS | Performed by: FAMILY MEDICINE

## 2020-02-12 PROCEDURE — 3023F SPIROM DOC REV: CPT | Performed by: FAMILY MEDICINE

## 2020-02-12 PROCEDURE — G8482 FLU IMMUNIZE ORDER/ADMIN: HCPCS | Performed by: FAMILY MEDICINE

## 2020-02-12 PROCEDURE — 83036 HEMOGLOBIN GLYCOSYLATED A1C: CPT | Performed by: FAMILY MEDICINE

## 2020-02-12 PROCEDURE — 1090F PRES/ABSN URINE INCON ASSESS: CPT | Performed by: FAMILY MEDICINE

## 2020-02-12 RX ORDER — TOBRAMYCIN AND DEXAMETHASONE 3; 1 MG/ML; MG/ML
SUSPENSION/ DROPS OPHTHALMIC
COMMUNITY
Start: 2020-01-30 | End: 2021-02-16

## 2020-02-12 RX ORDER — INSULIN GLARGINE 100 [IU]/ML
INJECTION, SOLUTION SUBCUTANEOUS
Qty: 5 VIAL | Refills: 3 | Status: SHIPPED
Start: 2020-02-12 | End: 2020-02-12 | Stop reason: SDUPTHER

## 2020-02-12 ASSESSMENT — ENCOUNTER SYMPTOMS
BLOOD IN STOOL: 0
COUGH: 0
BACK PAIN: 1
SHORTNESS OF BREATH: 0
RHINORRHEA: 0
CHEST TIGHTNESS: 1
COLOR CHANGE: 0
CONSTIPATION: 0
WHEEZING: 0
ABDOMINAL DISTENTION: 0
SORE THROAT: 0
SINUS PRESSURE: 0
ABDOMINAL PAIN: 0
DIARRHEA: 0
VOMITING: 0

## 2020-02-12 NOTE — TELEPHONE ENCOUNTER
Can we confirm from pharmacy , what she is on, she was on lantus her , not sure if insurance has changed, need to find dosage.

## 2020-02-12 NOTE — PROGRESS NOTES
Visit Information    Have you changed or started any medications since your last visit including any over-the-counter medicines, vitamins, or herbal medicines? no   Are you having any side effects from any of your medications? -  no  Have you stopped taking any of your medications? Is so, why? -  no    Have you seen any other physician or provider since your last visit? Yes - Records Obtained  Have you had any other diagnostic tests since your last visit? Yes - Records Obtained  Have you been seen in the emergency room and/or had an admission to a hospital since we last saw you? Yes - Records Obtained  Have you had your routine dental cleaning in the past 6 months? no    Have you activated your [employer] account? If not, what are your barriers?  Yes     Patient Care Team:  Alexander Turner MD as PCP - General (Family Medicine)  Alexander Turner MD as PCP - [facility] Provider  Naun Nolasco MD as Consulting Physician (Urology)  Ryley Vaughn MD as Consulting Physician (Pulmonology)  Aris Hernandez MD as Consulting Physician (Internal Medicine)  Jerry Fothergill (Optometry)  VERONIKA Mensah - CNP as Nurse Practitioner (Certified Nurse Practitioner)    Medical History Review  Past Medical, Family, and Social History reviewed and does contribute to the patient presenting condition    Health Maintenance   Topic Date Due    DTaP/Tdap/Td vaccine (1 - Tdap) 12/24/1960    Hepatitis B vaccine (1 of 3 - Risk 3-dose series) 12/24/1968    Shingles Vaccine (1 of 2) 12/24/1999    Annual Wellness Visit (AWV)  05/29/2019    Colon cancer screen colonoscopy  04/10/2020    Diabetic foot exam  04/18/2020    Breast cancer screen  09/27/2020    A1C test (Diabetic or Prediabetic)  10/31/2020    Lipid screen  11/01/2020    Diabetic retinal exam  01/30/2021    Potassium monitoring  02/02/2021    Creatinine monitoring  02/02/2021    DEXA (modify frequency per FRAX score)  Completed    Flu vaccine  Completed   

## 2020-02-12 NOTE — PROGRESS NOTES
Chief Complaint   Patient presents with    Follow-Up from [facility] chest pain          [facility]  here today for follow up on chronic medical problems, go over labs and/or diagnostic studies, and medication refills. Follow-Up from Hospital (.  chest pain )      HPI: Patient is here for 3-month follow-up and also for ER follow-up. Patient had recurrent admissions for chest pain, with recent 1 on February/2, patient was kept under observation. She had stress test and echocardiogram done, that was normal echo showed mild diastolic heart failure with ejection fraction normal.  Cardiology was consulted and discharge. Patient reports she had chest pain in the middle of the chest, she waited 1 to 2 days if it resolves and then she called 911, she wants to make sure it was not heart related. Patient is on multiple medications and also follows with cardiologist.    Diabetes A1c has improved, she is on Lantus 40 units twice daily, reports occasionally sugars are more than 200. She is also on NovoLog but is not using that. Hypertension controlled. Asthma with COPD is on inhalers and also has nebulizer. Congestive heart failure stable. History of A. fib currently is in sinus rhythm. /60   Pulse 70   Ht 5' 2\" (1.575 m)   Wt 256 lb 6.4 oz (116.3 kg)   LMP  (LMP Unknown)   SpO2 94% Comment: resting @ RA  BMI 46.90 kg/m²    Body mass index is 46.9 kg/m². Wt Readings from Last 3 Encounters:   02/12/20 256 lb 6.4 oz (116.3 kg)   02/02/20 255 lb (115.7 kg)   12/03/19 256 lb 6.4 oz (116.3 kg)        []Negative depression screening. PHQ Scores 9/24/2019 3/7/2019 2/14/2018 7/15/2015   PHQ2 Score 1 2 0 0   PHQ9 Score 1 2 0 0      [x]1-4 = Minimal depression   []5-9 = Milddepression   []10-14 = Moderate depression   []15-19 = Moderately severe depression   []20-27 = Severe depression    Discussed testing with the patient and all questions fully answered.     Admission on 02/02/2020, Discharged on 02/04/2020   Component Date Value Ref Range Status    Ventricular Rate 02/02/2020 65  BPM Final    Atrial Rate 02/02/2020 65  BPM Final    P-R Interval 02/02/2020 136  ms Final    QRS Duration 02/02/2020 66  ms Final    Q-T Interval 02/02/2020 390  ms Final    QTc Calculation (Bazett) 02/02/2020 405  ms Final    P Axis 02/02/2020 51  degrees Final    R Axis 02/02/2020 -30  degrees Final    T Axis 02/02/2020 76  degrees Final    Troponin, High Sensitivity 02/02/2020 7  0 - 14 ng/L Final    Comment:       High Sensitivity Troponin values cannot be compared with other Troponin methodologies. Patients with high levels of Biotin oral intake (i.e >5mg/day) may have falsely decreased   Troponin levels. Samples collected within 8 hours of biotin intake may require additional   information for diagnosis.  Troponin T 02/02/2020 NOT REPORTED  <0.03 ng/mL Final    Troponin Interp 02/02/2020 NOT REPORTED   Final    Troponin, High Sensitivity 02/02/2020 <6  0 - 14 ng/L Final    Comment:       High Sensitivity Troponin values cannot be compared with other Troponin methodologies. Patients with high levels of Biotin oral intake (i.e >5mg/day) may have falsely decreased   Troponin levels. Samples collected within 8 hours of biotin intake may require additional   information for diagnosis.  Troponin T 02/02/2020 NOT REPORTED  <0.03 ng/mL Final    Troponin Interp 02/02/2020 NOT REPORTED   Final    Troponin, High Sensitivity 02/02/2020 7  0 - 14 ng/L Final    Comment:       High Sensitivity Troponin values cannot be compared with other Troponin methodologies. Patients with high levels of Biotin oral intake (i.e >5mg/day) may have falsely decreased   Troponin levels. Samples collected within 8 hours of biotin intake may require additional   information for diagnosis.       Troponin T 02/02/2020 NOT REPORTED  <0.03 ng/mL Final    Troponin Interp 02/02/2020 NOT REPORTED   Final    Pro-BNP 02/02/2020 363* <300 pg/mL Final     Pro-BNP results cannot be compared to BNP results.  BNP Interpretation 02/02/2020 Pro-BNP Reference Range:   Final    Comment:       Rule Out:    <300        [place]:   Age <50     300-450   Age 54-65   300-900   Age >75     300-1800  Usually represents mild to moderate HF but other cardiopulmonary causes cannot be ruled out.         Rule In:   Age <50     >65   Age 54-65   >900   Age >75    >5      WBC 02/02/2020 7.3  3.5 - 11.3 k/uL Final    RBC 02/02/2020 4.20  3.95 - 5.11 m/uL Final    Hemoglobin 02/02/2020 12.8  11.9 - 15.1 g/dL Final    Hematocrit 02/02/2020 41.0  36.3 - 47.1 % Final    MCV 02/02/2020 97.6  82.6 - 102.9 fL Final    MCH 02/02/2020 30.5  25.2 - 33.5 pg Final    MCHC 02/02/2020 31.2  28.4 - 34.8 g/dL Final    RDW 02/02/2020 12.7  11.8 - 14.4 % Final    Platelets 65/66/6579 200  138 - 453 k/uL Final    MPV 02/02/2020 12.1  8.1 - 13.5 fL Final    NRBC Automated 02/02/2020 0.0  0.0 per 100 WBC Final    Differential Type 02/02/2020 NOT REPORTED   Final    Seg Neutrophils 02/02/2020 56  36 - 65 % Final    Lymphocytes 02/02/2020 29  24 - 43 % Final    Monocytes 02/02/2020 10  3 - 12 % Final    Eosinophils % 02/02/2020 3  1 - 4 % Final    Basophils 02/02/2020 1  0 - 2 % Final    Immature Granulocytes 02/02/2020 1* 0 % Final    Segs Absolute 02/02/2020 4.10  1.50 - 8.10 k/uL Final    Absolute Lymph # 02/02/2020 2.12  1.10 - 3.70 k/uL Final    Absolute Mono # 02/02/2020 0.75  0.10 - 1.20 k/uL Final    Absolute Eos # 02/02/2020 0.18  0.00 - 0.44 k/uL Final    Basophils Absolute 02/02/2020 0.07  0.00 - 0.20 k/uL Final    Absolute Immature Granulocyte 02/02/2020 0.05  0.00 - 0.30 k/uL Final    WBC Morphology 02/02/2020 NOT REPORTED   Final    RBC Morphology 02/02/2020 NOT REPORTED   Final    Platelet Estimate 79/10/2801 NOT REPORTED   Final    Glucose 02/02/2020 232* 70 - 99 mg/dL Final    BUN 02/02/2020 15 8 - 23 mg/dL Final    CREATININE 02/02/2020 0.95* 0.50 - 0.90 mg/dL Final    Bun/Cre Ratio 02/02/2020 NOT REPORTED  9 - 20 Final    Calcium 02/02/2020 8.9  8.6 - 10.4 mg/dL Final    Sodium 02/02/2020 136  135 - 144 mmol/L Final    Potassium 02/02/2020 4.7  3.7 - 5.3 mmol/L Final    Chloride 02/02/2020 100  98 - 107 mmol/L Final    CO2 02/02/2020 23  20 - 31 mmol/L Final    Anion Gap 02/02/2020 13  9 - 17 mmol/L Final    GFR Non- 02/02/2020 58* >60 mL/min Final    GFR  02/02/2020 >60  >60 mL/min Final    GFR Comment 02/02/2020        Final    Comment: Average GFR for 79or more years old:   76 mL/min/1.73sq m  Chronic Kidney Disease:   <60 mL/min/1.73sq m  Kidney failure:   <15 mL/min/1.73sq m              eGFR calculated using average adult body mass.  Additional eGFR calculator available at:        [employer]            GFR Staging 02/02/2020 NOT REPORTED   Final    POC Glucose 02/02/2020 143* 65 - 105 mg/dL Final    Ventricular Rate 02/03/2020 65  BPM Final    Atrial Rate 02/03/2020 65  BPM Final    P-R Interval 02/03/2020 148  ms Final    QRS Duration 02/03/2020 68  ms Final    Q-T Interval 02/03/2020 420  ms Final    QTc Calculation (Bazett) 02/03/2020 436  ms Final    P Axis 02/03/2020 73  degrees Final    R Axis 02/03/2020 -32  degrees Final    T Axis 02/03/2020 72  degrees Final    POC Glucose 02/03/2020 166* 65 - 105 mg/dL Final    POC Glucose 02/03/2020 307* 65 - 105 mg/dL Final    POC Glucose 02/03/2020 321* 65 - 105 mg/dL Final    POC Glucose 02/03/2020 365* 65 - 105 mg/dL Final    POC Glucose 02/04/2020 182* 65 - 105 mg/dL Final    POC Glucose 02/04/2020 161* 65 - 105 mg/dL Final         Most recent labs reviewed:     Lab Results   Component Value Date    WBC 7.3 02/02/2020    HGB 12.8 02/02/2020    HCT 41.0 02/02/2020    MCV 97.6 02/02/2020     02/02/2020 @BRIEFLAB(NA,K,CL,CO2,BUN,CREATININE,GLUCOSE,CALCIUM)@     Lab Results   Component Value Date    ALT 9 11/04/2019    AST 9 11/04/2019    ALKPHOS 57 11/04/2019    BILITOT 0.31 11/04/2019       Lab Results   Component Value Date    TSH 2.84 07/28/2018       Lab Results   Component Value Date    CHOL 193 11/01/2019    CHOL 385 (H) 09/25/2018    CHOL 112 01/21/2018     Lab Results   Component Value Date    TRIG 291 (H) 11/01/2019    TRIG 269 (H) 09/25/2018    TRIG 150 (H) 01/21/2018     Lab Results   Component Value Date    HDL 37 (L) 11/01/2019    HDL 42 09/25/2018    HDL 36 (L) 01/21/2018     Lab Results   Component Value Date    LDLCHOLESTEROL 98 11/01/2019    LDLCHOLESTEROL 289 (H) 09/25/2018    LDLCHOLESTEROL 46 01/21/2018     Lab Results   Component Value Date    VLDL NOT REPORTED (H) 11/01/2019    VLDL NOT REPORTED 09/25/2018    VLDL NOT REPORTED 01/21/2018     Lab Results   Component Value Date    CHOLHDLRATIO 5.2 (H) 11/01/2019    CHOLHDLRATIO 9.2 (H) 09/25/2018    CHOLHDLRATIO 3.1 01/21/2018       Lab Results   Component Value Date    LABA1C 8.4 02/12/2020       Lab Results   Component Value Date    MMUASUES69 389 10/19/2017       Lab Results   Component Value Date    FOLATE 8.3 10/19/2017       Lab Results   Component Value Date    IRON 47 (L) 02/23/2012    TIBC 377 02/23/2012    FERRITIN 12 02/23/2012       Lab Results   Component Value Date    VITD25 14.5 (L) 10/19/2017             Current Outpatient Medications   Medication Sig Dispense Refill    tobramycin-dexamethasone (TOBRADEX) 0.3-0.1 % ophthalmic suspension       insulin glargine (LANTUS) 100 UNIT/ML injection vial Inject 42 U bid 5 vial 3    insulin aspart (NOVOLOG FLEXPEN) 100 UNIT/ML injection pen If <139 - No Insulin; 140-199-2 Units;200-249-4 Units;250-299-6 Units;300-349-8 Units;350-400=10 Units; Above 400-12 Units 5 pen 3    oxyCODONE-acetaminophen (PERCOCET) 5-325 MG per tablet Take 1 tablet by mouth See Admin Instructions for 30 frequency, hematuria, urgency and vaginal pain. Musculoskeletal: Positive for arthralgias, back pain and gait problem. Negative for neck pain and neck stiffness. Skin: Negative for color change and wound. Neurological: Positive for weakness and numbness. Negative for dizziness, speech difficulty, light-headedness and headaches. Psychiatric/Behavioral: Negative for agitation, decreased concentration, dysphoric mood and sleep disturbance. The patient is not nervous/anxious. Physical Exam  Vitals signs and nursing note reviewed. Constitutional:       Appearance: She is obese. HENT:      Head: Normocephalic and atraumatic. Nose: Nose normal.      Mouth/Throat:      Mouth: Mucous membranes are moist.   Neck:      Musculoskeletal: Normal range of motion. Cardiovascular:      Rate and Rhythm: Normal rate and regular rhythm. Heart sounds: Normal heart sounds. Pulmonary:      Effort: Pulmonary effort is normal.      Breath sounds: Normal breath sounds. No wheezing or rhonchi. Abdominal:      General: Bowel sounds are normal. There is distension. Palpations: Abdomen is soft. Tenderness: There is no abdominal tenderness. Musculoskeletal:      Right knee: She exhibits decreased range of motion. She exhibits no swelling and no deformity. No tenderness found. No LCL tenderness noted. Left knee: She exhibits decreased range of motion. No tenderness found. Lumbar back: She exhibits decreased range of motion, deformity, pain and spasm. She exhibits no tenderness, no bony tenderness and no edema. Comments: Patient is wheelchair-bound. Skin:     General: Skin is warm. Neurological:      Mental Status: She is alert and oriented to person, place, and time. Sensory: Sensory deficit present. Motor: Weakness present. Comments: Patient is on wheelchair. ASSESSMENT AND PLAN      1.  Type 2 diabetes mellitus with diabetic polyneuropathy, with RECONCILED W/ CURRENT OUTPATIENT MED LIST         Medications Discontinued During This Encounter   Medication Reason    insulin glargine (LANTUS) 100 UNIT/ML injection vial REORDER    insulin aspart (NOVOLOG FLEXPEN) 100 UNIT/ML injection pen REORDER       Mariangel received counseling on the following healthy behaviors: nutrition, exercise and medication adherence  Reviewed prior labs and health maintenance  Continue current medications, diet and exercise. Discussed use, benefit, and side effects of prescribed medications. Barriers to medication compliance addressed. Patient given educational materials - see patient instructions  Was a self-tracking handout given in paper form or via [employer]? Yes    Requested Prescriptions     Signed Prescriptions Disp Refills    insulin glargine (LANTUS) 100 UNIT/ML injection vial 5 vial 3     Sig: Inject 42 U bid    insulin aspart (NOVOLOG FLEXPEN) 100 UNIT/ML injection pen 5 pen 3     Sig: If <139 - No Insulin; 140-199-2 Units;200-249-4 Units;250-299-6 Units;300-349-8 Units;350-400=10 Units; Above 400-12 Units       All patient questions answered. Patient voiced understanding. Quality Measures    Body mass index is 46.9 kg/m². Elevated. Weight control planned discussed Healthy diet and regular exercise. BP: 100/60. Blood pressure is normal. Treatment plan consists of No treatment change needed. Fall Risk 3/7/2019 2/14/2018 1/4/2017 7/15/2015 2/5/2015   2 or more falls in past year? no no no no no   Fall with injury in past year? no no no no no     The patient does not have a history of falls. I did , complete a risk assessment for falls.  A plan of care for falls in-office gait and balance testing performed using The Timed Up and Go Test was negative for increased falls risk- no further intervention is currently indicated, No Treatment plan indicated    Lab Results   Component Value Date    LDLCHOLESTEROL 98 11/01/2019    (goal LDL reduction with dx if diabetes is 50% LDL reduction)    PHQ Scores 9/24/2019 3/7/2019 2/14/2018 7/15/2015   PHQ2 Score 1 2 0 0   PHQ9 Score 1 2 0 0     Interpretation of Total Score Depression Severity: 1-4 = Minimal depression, 5-9 = Mild depression, 10-14 = Moderate depression, 15-19 = Moderately severe depression, 20-27 = Severe depression      The patient'spast medical, surgical, social, and family history as well as her   current medications and allergies were reviewed as documented in today's encounter. Medications, labs, diagnostic studies, consultations andfollow-up as documented in this encounter. Return in about 3 months (around 5/12/2020) for dm ,htn, hld, A1C. Patient wasseen with total face to face time of 25 minutes. More than 50% of this visit was counseling and education. Future Appointments   Date Time Provider Clarence Mas   2/17/2020  2:00  Jhony Crowley MD [facility] [place] [facility]   3/2/2020 10:00 AM Ginny Meneses APRN - [id number]   5/12/2020 10:00 AM Rachel Moreland MD fp sc [facility]     This note was completed by using the assistance of a speech-recognition program. However, inadvertent computerized transcription errors may be present. Althoughevery effort was made to ensure accuracy, no guarantees can be provided that every mistake has been identified and corrected by editing.   Electronically signed by Rachel Moreland MD on 2/12/2020  10:38 AM

## 2020-03-02 ENCOUNTER — HOSPITAL ENCOUNTER (OUTPATIENT)
Dept: PAIN MANAGEMENT | Age: 70
Discharge: HOME | End: 2020-03-02
Payer: COMMERCIAL

## 2020-03-02 VITALS
RESPIRATION RATE: 16 BRPM | DIASTOLIC BLOOD PRESSURE: 71 MMHG | HEART RATE: 88 BPM | SYSTOLIC BLOOD PRESSURE: 142 MMHG | OXYGEN SATURATION: 94 %

## 2020-03-02 PROCEDURE — 99213 OFFICE O/P EST LOW 20 MIN: CPT

## 2020-03-02 PROCEDURE — 99213 OFFICE O/P EST LOW 20 MIN: CPT | Performed by: NURSE PRACTITIONER

## 2020-03-02 RX ORDER — OXYCODONE HYDROCHLORIDE AND ACETAMINOPHEN 5; 325 MG/1; MG/1
1 TABLET ORAL SEE ADMIN INSTRUCTIONS
Qty: 100 TABLET | Refills: 0 | Status: SHIPPED | OUTPATIENT
Start: 2020-03-06 | End: 2020-04-06 | Stop reason: SDUPTHER

## 2020-03-02 RX ORDER — GABAPENTIN 300 MG/1
300 CAPSULE ORAL 3 TIMES DAILY
Qty: 90 CAPSULE | Refills: 0 | Status: SHIPPED | OUTPATIENT
Start: 2020-03-02 | End: 2020-07-30 | Stop reason: SDUPTHER

## 2020-03-02 RX ORDER — TOPIRAMATE 100 MG/1
100 TABLET, FILM COATED ORAL NIGHTLY
Qty: 90 TABLET | Refills: 1 | Status: SHIPPED | OUTPATIENT
Start: 2020-03-02 | End: 2020-10-19

## 2020-03-02 ASSESSMENT — ENCOUNTER SYMPTOMS
SHORTNESS OF BREATH: 0
CONSTIPATION: 0
COUGH: 0
BACK PAIN: 1

## 2020-03-27 RX ORDER — CLOPIDOGREL BISULFATE 75 MG/1
TABLET ORAL
Qty: 90 TABLET | Refills: 1 | Status: SHIPPED | OUTPATIENT
Start: 2020-03-27 | End: 2020-10-07

## 2020-04-06 RX ORDER — OXYCODONE HYDROCHLORIDE AND ACETAMINOPHEN 5; 325 MG/1; MG/1
1 TABLET ORAL SEE ADMIN INSTRUCTIONS
Qty: 100 TABLET | Refills: 0 | Status: SHIPPED | OUTPATIENT
Start: 2020-04-06 | End: 2020-04-29 | Stop reason: SDUPTHER

## 2020-04-22 ENCOUNTER — APPOINTMENT (OUTPATIENT)
Dept: CT IMAGING | Age: 70
End: 2020-04-22
Payer: COMMERCIAL

## 2020-04-22 ENCOUNTER — HOSPITAL ENCOUNTER (OUTPATIENT)
Age: 70
Setting detail: OBSERVATION
Discharge: HOME | End: 2020-04-23
Attending: EMERGENCY MEDICINE | Admitting: EMERGENCY MEDICINE
Payer: COMMERCIAL

## 2020-04-22 LAB
-: NORMAL
ABSOLUTE EOS #: 0.18 K/UL (ref 0–0.44)
ABSOLUTE IMMATURE GRANULOCYTE: 0.04 K/UL (ref 0–0.3)
ABSOLUTE LYMPH #: 1.67 K/UL (ref 1.1–3.7)
ABSOLUTE MONO #: 0.68 K/UL (ref 0.1–1.2)
ANION GAP SERPL CALCULATED.3IONS-SCNC: 17 MMOL/L (ref 9–17)
BASOPHILS # BLD: 1 % (ref 0–2)
BASOPHILS ABSOLUTE: 0.07 K/UL (ref 0–0.2)
BNP INTERPRETATION: NORMAL
BUN BLDV-MCNC: 19 MG/DL (ref 8–23)
BUN/CREAT BLD: ABNORMAL (ref 9–20)
C-REACTIVE PROTEIN: 2.4 MG/L (ref 0–5)
CALCIUM SERPL-MCNC: 9.3 MG/DL (ref 8.6–10.4)
CHLORIDE BLD-SCNC: 93 MMOL/L (ref 98–107)
CO2: 22 MMOL/L (ref 20–31)
CREAT SERPL-MCNC: 1.03 MG/DL (ref 0.5–0.9)
D-DIMER QUANTITATIVE: 0.66 MG/L FEU
DIFFERENTIAL TYPE: ABNORMAL
EOSINOPHILS RELATIVE PERCENT: 2 % (ref 1–4)
FERRITIN: 91 UG/L (ref 13–150)
GFR AFRICAN AMERICAN: >60 ML/MIN
GFR NON-AFRICAN AMERICAN: 53 ML/MIN
GFR SERPL CREATININE-BSD FRML MDRD: ABNORMAL ML/MIN/{1.73_M2}
GFR SERPL CREATININE-BSD FRML MDRD: ABNORMAL ML/MIN/{1.73_M2}
GLUCOSE BLD-MCNC: 197 MG/DL (ref 65–105)
GLUCOSE BLD-MCNC: 226 MG/DL (ref 70–99)
GLUCOSE BLD-MCNC: 245 MG/DL (ref 65–105)
HCT VFR BLD CALC: 45.1 % (ref 36.3–47.1)
HEMOGLOBIN: 14.6 G/DL (ref 11.9–15.1)
IMMATURE GRANULOCYTES: 1 %
LACTATE DEHYDROGENASE: 134 U/L (ref 135–214)
LYMPHOCYTES # BLD: 20 % (ref 24–43)
MCH RBC QN AUTO: 30.7 PG (ref 25.2–33.5)
MCHC RBC AUTO-ENTMCNC: 32.4 G/DL (ref 28.4–34.8)
MCV RBC AUTO: 94.7 FL (ref 82.6–102.9)
MONOCYTES # BLD: 8 % (ref 3–12)
NRBC AUTOMATED: 0 PER 100 WBC
PDW BLD-RTO: 13.2 % (ref 11.8–14.4)
PLATELET # BLD: ABNORMAL K/UL (ref 138–453)
PLATELET ESTIMATE: ABNORMAL
PLATELET, FLUORESCENCE: NORMAL K/UL (ref 138–453)
PMV BLD AUTO: ABNORMAL FL (ref 8.1–13.5)
POTASSIUM SERPL-SCNC: 4.5 MMOL/L (ref 3.7–5.3)
PRO-BNP: 81 PG/ML
RBC # BLD: 4.76 M/UL (ref 3.95–5.11)
RBC # BLD: ABNORMAL 10*6/UL
REASON FOR REJECTION: NORMAL
SEG NEUTROPHILS: 68 % (ref 36–65)
SEGMENTED NEUTROPHILS ABSOLUTE COUNT: 5.64 K/UL (ref 1.5–8.1)
SODIUM BLD-SCNC: 132 MMOL/L (ref 135–144)
TROPONIN INTERP: NORMAL
TROPONIN INTERP: NORMAL
TROPONIN T: NORMAL NG/ML
TROPONIN T: NORMAL NG/ML
TROPONIN, HIGH SENSITIVITY: 6 NG/L (ref 0–14)
TROPONIN, HIGH SENSITIVITY: 7 NG/L (ref 0–14)
WBC # BLD: 8.3 K/UL (ref 3.5–11.3)
WBC # BLD: ABNORMAL 10*3/UL
ZZ NTE CLEAN UP: ORDERED TEST: NORMAL
ZZ NTE WITH NAME CLEAN UP: SPECIMEN SOURCE: NORMAL

## 2020-04-22 PROCEDURE — 6370000000 HC RX 637 (ALT 250 FOR IP): Performed by: STUDENT IN AN ORGANIZED HEALTH CARE EDUCATION/TRAINING PROGRAM

## 2020-04-22 PROCEDURE — 82728 ASSAY OF FERRITIN: CPT

## 2020-04-22 PROCEDURE — 84484 ASSAY OF TROPONIN QUANT: CPT

## 2020-04-22 PROCEDURE — 83615 LACTATE (LD) (LDH) ENZYME: CPT

## 2020-04-22 PROCEDURE — 94640 AIRWAY INHALATION TREATMENT: CPT

## 2020-04-22 PROCEDURE — 80048 BASIC METABOLIC PNL TOTAL CA: CPT

## 2020-04-22 PROCEDURE — 6370000000 HC RX 637 (ALT 250 FOR IP): Performed by: EMERGENCY MEDICINE

## 2020-04-22 PROCEDURE — 83880 ASSAY OF NATRIURETIC PEPTIDE: CPT

## 2020-04-22 PROCEDURE — G0378 HOSPITAL OBSERVATION PER HR: HCPCS

## 2020-04-22 PROCEDURE — 82947 ASSAY GLUCOSE BLOOD QUANT: CPT

## 2020-04-22 PROCEDURE — 71250 CT THORAX DX C-: CPT

## 2020-04-22 PROCEDURE — 85025 COMPLETE CBC W/AUTO DIFF WBC: CPT

## 2020-04-22 PROCEDURE — 85379 FIBRIN DEGRADATION QUANT: CPT

## 2020-04-22 PROCEDURE — 99285 EMERGENCY DEPT VISIT HI MDM: CPT

## 2020-04-22 PROCEDURE — 93005 ELECTROCARDIOGRAM TRACING: CPT | Performed by: STUDENT IN AN ORGANIZED HEALTH CARE EDUCATION/TRAINING PROGRAM

## 2020-04-22 PROCEDURE — 86140 C-REACTIVE PROTEIN: CPT

## 2020-04-22 PROCEDURE — 94660 CPAP INITIATION&MGMT: CPT

## 2020-04-22 PROCEDURE — 85055 RETICULATED PLATELET ASSAY: CPT

## 2020-04-22 RX ORDER — LOSARTAN POTASSIUM 25 MG/1
25 TABLET ORAL DAILY
Status: DISCONTINUED | OUTPATIENT
Start: 2020-04-22 | End: 2020-04-23 | Stop reason: HOSPADM

## 2020-04-22 RX ORDER — OXYCODONE HYDROCHLORIDE 5 MG/1
5 TABLET ORAL ONCE
Status: COMPLETED | OUTPATIENT
Start: 2020-04-22 | End: 2020-04-22

## 2020-04-22 RX ORDER — ONDANSETRON 4 MG/1
4 TABLET, ORALLY DISINTEGRATING ORAL ONCE
Status: COMPLETED | OUTPATIENT
Start: 2020-04-22 | End: 2020-04-22

## 2020-04-22 RX ORDER — ATORVASTATIN CALCIUM 80 MG/1
80 TABLET, FILM COATED ORAL DAILY
Status: DISCONTINUED | OUTPATIENT
Start: 2020-04-22 | End: 2020-04-23 | Stop reason: HOSPADM

## 2020-04-22 RX ORDER — GABAPENTIN 300 MG/1
300 CAPSULE ORAL 3 TIMES DAILY
Status: DISCONTINUED | OUTPATIENT
Start: 2020-04-22 | End: 2020-04-23 | Stop reason: HOSPADM

## 2020-04-22 RX ORDER — UREA 10 %
10 LOTION (ML) TOPICAL NIGHTLY
Status: DISCONTINUED | OUTPATIENT
Start: 2020-04-22 | End: 2020-04-23 | Stop reason: HOSPADM

## 2020-04-22 RX ORDER — NITROGLYCERIN 0.4 MG/1
0.4 TABLET SUBLINGUAL EVERY 5 MIN PRN
Status: DISCONTINUED | OUTPATIENT
Start: 2020-04-22 | End: 2020-04-23 | Stop reason: HOSPADM

## 2020-04-22 RX ORDER — BUDESONIDE AND FORMOTEROL FUMARATE DIHYDRATE 160; 4.5 UG/1; UG/1
2 AEROSOL RESPIRATORY (INHALATION) 2 TIMES DAILY
Status: DISCONTINUED | OUTPATIENT
Start: 2020-04-22 | End: 2020-04-23 | Stop reason: HOSPADM

## 2020-04-22 RX ORDER — LANOLIN ALCOHOL/MO/W.PET/CERES
325 CREAM (GRAM) TOPICAL
Status: DISCONTINUED | OUTPATIENT
Start: 2020-04-23 | End: 2020-04-23 | Stop reason: HOSPADM

## 2020-04-22 RX ORDER — CARVEDILOL 3.12 MG/1
3.12 TABLET ORAL 2 TIMES DAILY
Status: DISCONTINUED | OUTPATIENT
Start: 2020-04-22 | End: 2020-04-23 | Stop reason: HOSPADM

## 2020-04-22 RX ORDER — TOPIRAMATE 100 MG/1
100 TABLET, FILM COATED ORAL NIGHTLY
Status: DISCONTINUED | OUTPATIENT
Start: 2020-04-22 | End: 2020-04-23 | Stop reason: HOSPADM

## 2020-04-22 RX ORDER — PANTOPRAZOLE SODIUM 40 MG/1
40 TABLET, DELAYED RELEASE ORAL 2 TIMES DAILY
Status: DISCONTINUED | OUTPATIENT
Start: 2020-04-22 | End: 2020-04-23 | Stop reason: HOSPADM

## 2020-04-22 RX ORDER — CLOPIDOGREL BISULFATE 75 MG/1
75 TABLET ORAL DAILY
Status: DISCONTINUED | OUTPATIENT
Start: 2020-04-22 | End: 2020-04-23 | Stop reason: HOSPADM

## 2020-04-22 RX ORDER — ASPIRIN 81 MG/1
81 TABLET, CHEWABLE ORAL DAILY
Status: DISCONTINUED | OUTPATIENT
Start: 2020-04-22 | End: 2020-04-23 | Stop reason: HOSPADM

## 2020-04-22 RX ORDER — CITALOPRAM 20 MG/1
40 TABLET ORAL DAILY
Status: DISCONTINUED | OUTPATIENT
Start: 2020-04-22 | End: 2020-04-23

## 2020-04-22 RX ORDER — DOCUSATE SODIUM 100 MG/1
100 CAPSULE, LIQUID FILLED ORAL 2 TIMES DAILY
Status: DISCONTINUED | OUTPATIENT
Start: 2020-04-22 | End: 2020-04-23 | Stop reason: HOSPADM

## 2020-04-22 RX ORDER — ACETAMINOPHEN 500 MG
1000 TABLET ORAL EVERY 8 HOURS PRN
Status: DISCONTINUED | OUTPATIENT
Start: 2020-04-22 | End: 2020-04-23 | Stop reason: HOSPADM

## 2020-04-22 RX ADMIN — ONDANSETRON 4 MG: 4 TABLET, ORALLY DISINTEGRATING ORAL at 15:13

## 2020-04-22 RX ADMIN — Medication 10 MG: at 22:16

## 2020-04-22 RX ADMIN — TOPIRAMATE 100 MG: 100 TABLET, FILM COATED ORAL at 20:42

## 2020-04-22 RX ADMIN — OXYCODONE HYDROCHLORIDE 5 MG: 5 TABLET ORAL at 15:13

## 2020-04-22 RX ADMIN — PANTOPRAZOLE SODIUM 40 MG: 40 TABLET, DELAYED RELEASE ORAL at 20:42

## 2020-04-22 RX ADMIN — CARVEDILOL 3.12 MG: 3.12 TABLET, FILM COATED ORAL at 20:42

## 2020-04-22 RX ADMIN — INSULIN LISPRO 3 UNITS: 100 INJECTION, SOLUTION INTRAVENOUS; SUBCUTANEOUS at 20:43

## 2020-04-22 RX ADMIN — BUDESONIDE AND FORMOTEROL FUMARATE DIHYDRATE 2 PUFF: 160; 4.5 AEROSOL RESPIRATORY (INHALATION) at 20:16

## 2020-04-22 RX ADMIN — MAGNESIUM GLUCONATE 500 MG ORAL TABLET 200 MG: 500 TABLET ORAL at 20:42

## 2020-04-22 RX ADMIN — ACETAMINOPHEN 1000 MG: 500 TABLET ORAL at 20:45

## 2020-04-22 RX ADMIN — GABAPENTIN 300 MG: 300 CAPSULE ORAL at 22:14

## 2020-04-22 ASSESSMENT — PAIN SCALES - GENERAL
PAINLEVEL_OUTOF10: 8
PAINLEVEL_OUTOF10: 7
PAINLEVEL_OUTOF10: 5
PAINLEVEL_OUTOF10: 7

## 2020-04-22 ASSESSMENT — PAIN DESCRIPTION - PAIN TYPE
TYPE: ACUTE PAIN
TYPE: ACUTE PAIN

## 2020-04-22 ASSESSMENT — ENCOUNTER SYMPTOMS
COUGH: 1
DIARRHEA: 1
SHORTNESS OF BREATH: 1
BACK PAIN: 0
ABDOMINAL PAIN: 0
WHEEZING: 1
NAUSEA: 1
VOMITING: 0

## 2020-04-22 ASSESSMENT — PAIN DESCRIPTION - DESCRIPTORS: DESCRIPTORS: OTHER (COMMENT)

## 2020-04-22 ASSESSMENT — PAIN DESCRIPTION - LOCATION
LOCATION: HEAD
LOCATION: CHEST

## 2020-04-22 ASSESSMENT — PAIN DESCRIPTION - ORIENTATION: ORIENTATION: LEFT

## 2020-04-22 ASSESSMENT — HEART SCORE: ECG: 1

## 2020-04-22 NOTE — CARE COORDINATION
Case Management Initial Discharge Plan  Verenice Lindsey,             Met with:patient to discuss discharge plans. Information verified: address, contacts, phone number, , insurance Yes  Emergency Contact/Next of Kin name & number:   PCP: Marla Mauricio MD  Date of last visit: 3 months ago    Insurance Provider: Ayleen Dual    Discharge Planning    Living Arrangements:  [place]:  Children    Home: apartment, level entrance, building has an elevator  Patient able to perform ADL's:Independent    Current Services (outpatient & in home) [place] [facility]: M-W-F. 3 hours. DME equipment: scooter, walker, nebulizer, c-pap, o2 at night with c-pap, life alert, glucometer, shower chair,  DME provider:       Potential Assistance Needed:       Patient agreeable to home care: No  [place] of choice provided:  n/a    Prior SNF/Rehab Placement and Facility:   Agreeable to SNF/Rehab: No  [place] of choice provided: n/a   Evaluation: no    Expected Discharge date:  20  Patient expects to be discharged to:  home  Follow Up Appointment: Best Day/ Time:      Transportation provider: medical cab  Transportation arrangements needed for discharge: TBD, medical cab vs granddaughter. Readmission Risk              Risk of Unplanned Readmission:        0             Does patient have a readmission risk score greater than 14?: not calculated. If yes, follow-up appointment must be made within 7 days of discharge. Goals of Care: decreased chest pain.       Discharge Plan: return to home, no skilled needs at present time          Electronically signed by Dm Alvarez RN on 20 at 6:08 PM EDT

## 2020-04-22 NOTE — ED PROVIDER NOTES
[facility] ONC/MED SURG  Emergency Department Encounter  EmergencyMedicine Resident     Pt Name:Mariangel Morrison  MRN: [id number]  Rozinagfceline 1950  Date of evaluation: 4/22/20  PCP:  Lor Larios MD    [address] [place] [address]       Chief Complaint   Patient presents with    Chest Pain     Lt sided cp started this morning while watching TV pain 10, then took one nitro went down to 7       HISTORY OF PRESENT ILLNESS  (Location/Symptom, Timing/Onset, Context/Setting, Quality, Duration, Modifying Factors, Severity.)    This patient was evaluated in the Emergency Department for symptoms described in the history of present illness. He/she was evaluated in the context of the global COVID-19 pandemic, which necessitated consideration that the patient might be at risk for infection with the SARS-CoV-2 virus that causes COVID-19. Institutional protocols and algorithms that pertain to the evaluation of patients at risk for COVID-19 are in a state of rapid change based on information released by regulatory bodies including the CDC and federal and state organizations. These policies and algorithms were followed during the patient's care in the ED. Yarely Wilson is a 78 y.o. female with a past medical history that includes asthma, COPD, CHF, CKD, TIA, hypertension, hyperlipidemia, history of MI, who presents to the ED today with complaints of sudden onset chest pain with associated dyspnea that started approximately an hour ago. Pain was maximal at onset at a 10 out of 10 but improved after nitro sublingual is taken at home. Described as a \"hard\" pain localized to left side of chest.  Does note generalized headache after taking nitro. Does have some mild numbness in left arm/hand. Denies any peripheral edema other than mild baseline edema. Denies recent orthopnea, fever or chills at home. Does note that she has a baseline dry cough that may have been worse recently.   Also notes that she is mildly more short of [metoclopramide]; Avelox [moxifloxacin hcl in nacl]; Cipro xr; and Sulfa antibiotics    Home Medications:  Prior to Admission medications    Medication Sig Start Date End Date Taking? Authorizing Provider   oxyCODONE-acetaminophen (PERCOCET) 5-325 MG per tablet Take 1 tablet by mouth See Admin Instructions for 30 days. Intended supply: 30 days. 1 tab 3-4 times a day prn 4/6/20 5/6/20  Regi Lloyd MD   clopidogrel (PLAVIX) 75 MG tablet TAKE 1 TAB BY MOUTH ONCE A DAY ( AM ) -THIS MEDICINE MAY BE TAKENWITH OR WITHOUT FOOD 3/27/20   Vijaya Brown MD   gabapentin (NEURONTIN) 300 MG capsule Take 1 capsule by mouth 3 times daily for 30 days. 3/2/20 4/1/20  VERONIKA Tenorio - CNP   topiramate (TOPAMAX) 100 MG tablet Take 1 tablet by mouth nightly 3/2/20   VERONIKA Tenorio - CNP   tobramycin-dexamethasone (TOBRADEX) 0.3-0.1 % ophthalmic suspension  1/30/20   Historical Provider, MD   insulin aspart (NOVOLOG FLEXPEN) 100 UNIT/ML injection pen If <139 - No Insulin; 140-199-2 Units;200-249-4 Units;250-299-6 Units;300-349-8 Units;350-400=10 Units; Above 400-12 Units 2/12/20   Vijaya Brown MD   insulin glargine (LANTUS SOLOSTAR) 100 UNIT/ML injection pen Inject 42 Units into the skin 2 times daily 2/12/20   Vijaya Brown MD   ranitidine (ZANTAC) 150 MG tablet TAKE 1 TAB BY MOUTH TWICE A DAY ( AM AND BEDTIME ) 1/24/20   Vijaya Brown MD   aspirin 81 MG chewable tablet Take 1 tablet by mouth daily 12/3/19   Vijaya Brown MD   atorvastatin (LIPITOR) 80 MG tablet Take 1 tablet by mouth daily 12/3/19   Vijaya Brown MD   lidocaine (LMX) 4 % cream Apply topically every 8 hrs as needed for pain 11/11/19   Vijaya Brown MD   naloxone 4 MG/0.1ML LIQD nasal spray 1 spray by Nasal route as needed for Opioid Reversal 10/10/19   Regi Lloyd MD   Lift Chair [id number] [facility] by Does not apply route 9/24/19   Vijaya Brown MD   [facility]  Devices (ADJUST BATH/SHOWER SEAT/BACK) MISC Use daily for shower 9/24/19 Yulissa Kingston MD   naloxone 4 MG/0.1ML LIQD nasal spray 1 spray by Nasal route as needed for Opioid Reversal 9/13/19   Bouchra Boston MD   ferrous sulfate 325 (65 Fe) MG tablet TAKE 1 TAB BY MOUTH EVERY MORNING 8/8/19   Yulissa Kingston MD   magnesium oxide (MAG-OX) 400 MG tablet TAKE 1 TAB BY MOUTH TWICE A DAY ( AM AND BEDTIME ) 8/8/19   Yulissa Kingston MD   carvedilol (COREG) 3.125 MG tablet TAKE 1 TAB BY MOUTH TWICE A DAY ( AM AND BEDTIME ) 8/8/19   Yulissa Kingston MD   metFORMIN (GLUCOPHAGE) 1000 MG tablet TAKE 1 TAB BY MOUTH TWICE A DAY ( AM AND BEDTIME ) 8/8/19   Yulissa Kingston MD   ULTICARE MINI PEN NEEDLES 31G X 6 MM MISC USE AS DIRECTED 8/8/19   Yulissa Kingston MD   citalopram (CELEXA) 40 MG tablet TAKE 1/2  TAB BY MOUTH TWICE  A DAY 4/2/19   Yulissa Kingston MD   pantoprazole (PROTONIX) 40 MG tablet Take 1 tablet by mouth 2 times daily 3/15/19   DO TESSA Hutchinson METRIX BLOOD GLUCOSE TEST strip THREE TIMES A DAY 12/13/18   Yulissa Kingston MD   Alcohol Swabs (B-D SINGLE USE SWABS REGULAR) PADS THREE TIMES A DAY 12/12/18   Yulissa Kingston MD   nystatin (MYCOSTATIN) 565344 UNIT/GM powder Apply 3 times daily.  12/10/18   Yulissa Kingston MD   furosemide (LASIX) 20 MG tablet Take 1 tablet by mouth daily as needed (weight gain 3 lbs overnight) 11/16/18   Ewa Ames MD   losartan (COZAAR) 25 MG tablet TAKE 1 TAB BY MOUTH ONCE A DAY 11/6/18   Yulissa Kingston MD   Lift Chair MISC by Does not apply route Use daily at home to help with ADL's 11/6/18   Yulissa Kingston MD   nitroGLYCERIN (NITROSTAT) 0.4 MG SL tablet 1 under the tongue as needed for angina, may repeat q5mins for up three doses 8/28/18   Historical Provider, MD   Blood Glucose Monitoring Suppl HARMEET Use daily to check BS 3/27/18   Yulissa Kingston MD   ipratropium-albuterol (DUONEB) 0.5-2.5 (3) MG/3ML SOLN nebulizer solution Inhale 3 mLs into the lungs every 4 hours 2/6/18   Roger Noel MD   Melatonin 10 MG TABS Take 10 mg by mouth nightly 10/19/17   Fiorella Obrien MD   Compression Stockings MISC by Does not apply route Pressure between 20 - 25 . 9/11/17   Fiorella Obrien MD   docusate sodium (COLACE) 100 MG capsule Take 1 capsule by mouth 2 times daily Please use while taking Percocet 9/10/15   Sanna Gupta MD   SYMBICORT 160-4.5 MCG/ACT AERO   2 puffs As needed for sob 5/28/15   Historical Provider, MD   OXYGEN Inhale 3 L into the lungs     Historical Provider, MD       REVIEW OF SYSTEMS    (2-9 systems for level 4, 10 or more for level 5)      Review of Systems   Constitutional: Negative for chills and fever. HENT: Negative for congestion. Eyes: Positive for visual disturbance. Respiratory: Positive for cough, shortness of breath and wheezing (At baseline). Cardiovascular: Positive for chest pain. Negative for palpitations and leg swelling. Gastrointestinal: Positive for diarrhea (At baseline) and nausea. Negative for abdominal pain and vomiting. Genitourinary: Negative for dysuria and hematuria. Musculoskeletal: Negative for back pain. Skin: Negative for rash. Neurological: Positive for headaches. Negative for dizziness, weakness and numbness. PHYSICAL EXAM   (up to 7 for level 4, 8 or more for level 5)      INITIAL VITALS:   /62   Pulse 80   Temp 97.4 °F (36.3 °C) (Oral)   Resp 18   Ht 5' 2\" (1.575 m)   Wt 250 lb (113.4 kg)   LMP  (LMP Unknown)   SpO2 95%   BMI 45.73 kg/m²     Physical Exam  Constitutional:       General: She is not in acute distress. Appearance: She is obese. She is not ill-appearing, toxic-appearing or diaphoretic. HENT:      Head: Normocephalic and atraumatic. Nose: Nose normal.      Mouth/Throat:      Mouth: Mucous membranes are moist.      Pharynx: No oropharyngeal exudate or posterior oropharyngeal erythema. Eyes:      Extraocular Movements: Extraocular movements intact.       Conjunctiva/sclera: Conjunctivae normal.      Pupils: Pupils are equal, round, and reactive to CHEST WO CONTRAST   Preliminary Result   Slight interval increase in size of the lateral right middle lobe pulmonary   nodule. Nodule should be further evaluated with PET-CT. Three-month   follow-up CT might also be considered. Centrilobular emphysema. No focal airspace consolidation. EKG  Normal sinus rhythm, rate of 76, leftward axis, normal intervals, possible left atrial enlargement, poor R wave progression, no acute ST or T wave changes    All EKG's are interpreted by the Emergency Department Physician who either signs or Co-signs this chart in the absence of a cardiologist.    IMPRESSION/MDM/EMERGENCY DEPARTMENT COURSE:  Patient came to emergency department, HPI and physical exam were conducted. All nursing notes were reviewed. 29-year-old female presenting emergency department complaints of chest pain and shortness of breath. History of MI but no stents. Did have stress test performed a few months ago that put patient at low risk. Echo at that time showed EF greater than 55%. Patient chest pain started approximately an hour prior to arrival while patient was at rest.  Medicated with sublingual nitro taken at home. 324 mg aspirin given in route. Some associated shortness of breath. Baseline dry cough secondary to COPD. Patient has not required additional supplemental oxygen at home for the past few days. Denies any symptoms of orthopnea or peripheral edema. No history of PE, cancer, travel, hormone therapy. Given that patient presented with viral like symptoms consistent with possible CoVID infection, proper PPE was donned. Patient was wearing mask at all time when I was in the room. I was wearing mask and goggles at all times when in the room including doorway and when > 6 feet away. During direct patient exam and when I was within 6 feet of the patient I was wearing hair net, procedural mask, and goggles. Slightly hypertensive.   Borderline tachypneic CARDIOLOGY      FINAL IMPRESSION      1.  Chest pain, unspecified type          DISPOSITION / PLAN     DISPOSITION Admitted 04/22/2020 04:01:45 PM      PATIENT REFERRED TO:  Angel Posada MD  [address] [facility] [phone]          Angel Posada MD  [address] [phone]            DISCHARGE MEDICATIONS:  Current Discharge Medication List          Farhan Roe DO  Emergency Medicine Resident    (Please note that portions of thisnote were completed with a voice recognition program.  Efforts were made to edit the dictations but occasionally words are mis-transcribed.)       Farhan Roe DO  Resident  04/22/20 [id number]

## 2020-04-22 NOTE — ED PROVIDER NOTES
Pertanant Comments: This patient was accidentally clicked on. I did not see or participate in the care of this patient.       MD Supa Rodriguez  Attending Emergency Medicine Physician        Daisy Hernandez MD  04/22/20 [id number]

## 2020-04-22 NOTE — ED PROVIDER NOTES
FACULTY SIGN-OUT  ADDENDUM     Care of this patient was assumed from Dr Wellington Arias. The patient was seen for Chest Pain (Lt sided cp started this morning while watching TV pain 10, then took one nitro went down to 7)  . The patient's initial evaluation and plan have been discussed with the prior provider who initially evaluated the patient. Nursing Notes, Past Medical Hx, Past Surgical Hx, Social Hx, Allergies, and Family Hx were all reviewed. ED COURSE      The patient was given the following medications:  Orders Placed This Encounter   Medications    ondansetron (ZOFRAN-ODT) disintegrating tablet 4 mg    oxyCODONE (ROXICODONE) immediate release tablet 5 mg       RECENT VITALS:   Temp: 98.2 °F (36.8 °C), Pulse: 71, Resp: 20, BP: (!) 145/90    MEDICAL DECISION MAKING       Pt with chest pain. Plan to admit for cardiac rule out.       Daren Bain MD  Emergency Medicine Attending        Nadira Oneil MD  04/22/20 [id number]

## 2020-04-22 NOTE — PROGRESS NOTES
I was assigned to this patient in error. I did not see this patient or participate in their care.     Robert Donahue DO  4/22/2020 1:03 PM

## 2020-04-23 VITALS
DIASTOLIC BLOOD PRESSURE: 50 MMHG | WEIGHT: 250 LBS | TEMPERATURE: 98.1 F | HEART RATE: 71 BPM | SYSTOLIC BLOOD PRESSURE: 96 MMHG | OXYGEN SATURATION: 98 % | BODY MASS INDEX: 46.01 KG/M2 | RESPIRATION RATE: 16 BRPM | HEIGHT: 62 IN

## 2020-04-23 LAB
ANION GAP SERPL CALCULATED.3IONS-SCNC: 14 MMOL/L (ref 9–17)
BUN BLDV-MCNC: 23 MG/DL (ref 8–23)
BUN/CREAT BLD: ABNORMAL (ref 9–20)
CALCIUM SERPL-MCNC: 9.8 MG/DL (ref 8.6–10.4)
CHLORIDE BLD-SCNC: 100 MMOL/L (ref 98–107)
CO2: 24 MMOL/L (ref 20–31)
CREAT SERPL-MCNC: 1.16 MG/DL (ref 0.5–0.9)
EKG ATRIAL RATE: 71 BPM
EKG ATRIAL RATE: 76 BPM
EKG P AXIS: 59 DEGREES
EKG P AXIS: 71 DEGREES
EKG P-R INTERVAL: 134 MS
EKG P-R INTERVAL: 156 MS
EKG Q-T INTERVAL: 372 MS
EKG Q-T INTERVAL: 394 MS
EKG QRS DURATION: 68 MS
EKG QRS DURATION: 78 MS
EKG QTC CALCULATION (BAZETT): 418 MS
EKG QTC CALCULATION (BAZETT): 428 MS
EKG R AXIS: -31 DEGREES
EKG R AXIS: -33 DEGREES
EKG T AXIS: 73 DEGREES
EKG T AXIS: 81 DEGREES
EKG VENTRICULAR RATE: 71 BPM
EKG VENTRICULAR RATE: 76 BPM
GFR AFRICAN AMERICAN: 56 ML/MIN
GFR NON-AFRICAN AMERICAN: 46 ML/MIN
GFR SERPL CREATININE-BSD FRML MDRD: ABNORMAL ML/MIN/{1.73_M2}
GFR SERPL CREATININE-BSD FRML MDRD: ABNORMAL ML/MIN/{1.73_M2}
GLUCOSE BLD-MCNC: 201 MG/DL (ref 65–105)
GLUCOSE BLD-MCNC: 226 MG/DL (ref 65–105)
GLUCOSE BLD-MCNC: 247 MG/DL (ref 70–99)
POTASSIUM SERPL-SCNC: 4.5 MMOL/L (ref 3.7–5.3)
SODIUM BLD-SCNC: 138 MMOL/L (ref 135–144)
TROPONIN INTERP: NORMAL
TROPONIN T: NORMAL NG/ML
TROPONIN, HIGH SENSITIVITY: 7 NG/L (ref 0–14)

## 2020-04-23 PROCEDURE — G0378 HOSPITAL OBSERVATION PER HR: HCPCS

## 2020-04-23 PROCEDURE — 94660 CPAP INITIATION&MGMT: CPT

## 2020-04-23 PROCEDURE — 80048 BASIC METABOLIC PNL TOTAL CA: CPT

## 2020-04-23 PROCEDURE — 2700000000 HC OXYGEN THERAPY PER DAY

## 2020-04-23 PROCEDURE — 6370000000 HC RX 637 (ALT 250 FOR IP): Performed by: STUDENT IN AN ORGANIZED HEALTH CARE EDUCATION/TRAINING PROGRAM

## 2020-04-23 PROCEDURE — 94640 AIRWAY INHALATION TREATMENT: CPT

## 2020-04-23 PROCEDURE — 84484 ASSAY OF TROPONIN QUANT: CPT

## 2020-04-23 PROCEDURE — 82947 ASSAY GLUCOSE BLOOD QUANT: CPT

## 2020-04-23 PROCEDURE — 93005 ELECTROCARDIOGRAM TRACING: CPT | Performed by: STUDENT IN AN ORGANIZED HEALTH CARE EDUCATION/TRAINING PROGRAM

## 2020-04-23 PROCEDURE — 93010 ELECTROCARDIOGRAM REPORT: CPT | Performed by: INTERNAL MEDICINE

## 2020-04-23 PROCEDURE — 6370000000 HC RX 637 (ALT 250 FOR IP): Performed by: EMERGENCY MEDICINE

## 2020-04-23 PROCEDURE — 36415 COLL VENOUS BLD VENIPUNCTURE: CPT

## 2020-04-23 RX ORDER — DEXTROSE MONOHYDRATE 50 MG/ML
100 INJECTION, SOLUTION INTRAVENOUS PRN
Status: DISCONTINUED | OUTPATIENT
Start: 2020-04-23 | End: 2020-04-23 | Stop reason: HOSPADM

## 2020-04-23 RX ORDER — DEXTROSE MONOHYDRATE 25 G/50ML
12.5 INJECTION, SOLUTION INTRAVENOUS PRN
Status: DISCONTINUED | OUTPATIENT
Start: 2020-04-23 | End: 2020-04-23 | Stop reason: HOSPADM

## 2020-04-23 RX ORDER — NICOTINE POLACRILEX 4 MG
15 LOZENGE BUCCAL PRN
Status: DISCONTINUED | OUTPATIENT
Start: 2020-04-23 | End: 2020-04-23 | Stop reason: HOSPADM

## 2020-04-23 RX ORDER — CITALOPRAM 20 MG/1
20 TABLET ORAL DAILY
Status: DISCONTINUED | OUTPATIENT
Start: 2020-04-24 | End: 2020-04-23 | Stop reason: HOSPADM

## 2020-04-23 RX ORDER — OXYCODONE HYDROCHLORIDE AND ACETAMINOPHEN 5; 325 MG/1; MG/1
2 TABLET ORAL EVERY 6 HOURS PRN
Status: DISCONTINUED | OUTPATIENT
Start: 2020-04-23 | End: 2020-04-23 | Stop reason: HOSPADM

## 2020-04-23 RX ADMIN — MAGNESIUM GLUCONATE 500 MG ORAL TABLET 200 MG: 500 TABLET ORAL at 10:44

## 2020-04-23 RX ADMIN — OXYCODONE HYDROCHLORIDE AND ACETAMINOPHEN 2 TABLET: 5; 325 TABLET ORAL at 10:48

## 2020-04-23 RX ADMIN — ASPIRIN 81 MG: 81 TABLET, CHEWABLE ORAL at 10:45

## 2020-04-23 RX ADMIN — GABAPENTIN 300 MG: 300 CAPSULE ORAL at 10:45

## 2020-04-23 RX ADMIN — ATORVASTATIN CALCIUM 80 MG: 80 TABLET, FILM COATED ORAL at 10:45

## 2020-04-23 RX ADMIN — CITALOPRAM 40 MG: 20 TABLET, FILM COATED ORAL at 10:45

## 2020-04-23 RX ADMIN — DOCUSATE SODIUM 100 MG: 100 CAPSULE, LIQUID FILLED ORAL at 10:45

## 2020-04-23 RX ADMIN — INSULIN LISPRO 4 UNITS: 100 INJECTION, SOLUTION INTRAVENOUS; SUBCUTANEOUS at 13:00

## 2020-04-23 RX ADMIN — FERROUS SULFATE TAB EC 325 MG (65 MG FE EQUIVALENT) 325 MG: 325 (65 FE) TABLET DELAYED RESPONSE at 10:45

## 2020-04-23 RX ADMIN — BUDESONIDE AND FORMOTEROL FUMARATE DIHYDRATE 2 PUFF: 160; 4.5 AEROSOL RESPIRATORY (INHALATION) at 08:24

## 2020-04-23 RX ADMIN — CLOPIDOGREL 75 MG: 75 TABLET, FILM COATED ORAL at 10:45

## 2020-04-23 RX ADMIN — PANTOPRAZOLE SODIUM 40 MG: 40 TABLET, DELAYED RELEASE ORAL at 10:44

## 2020-04-23 ASSESSMENT — PAIN SCALES - GENERAL: PAINLEVEL_OUTOF10: 7

## 2020-04-23 NOTE — DISCHARGE INSTR - COC
(6 mo and older Fluzone, Flulaval, Fluarix, and 3 yrs and older Afluria) 11/02/2019    Pneumococcal Conjugate 13-valent ([id number]) 06/15/2016    Pneumococcal Polysaccharide ([id number]) 01/01/2009, 09/11/2017       Active Problems:  Patient Active Problem List   Diagnosis Code    Chronic pain of left knee M25.562, G89.29    Type 2 diabetes mellitus with diabetic polyneuropathy, with long-term current use of insulin ([facility]) E11.42, Z79.4    Nonintractable migraine, unspecified migraine type G43.009    Coronary artery disease due to lipid rich plaque I25.10, I25.83    DDD (degenerative disc disease), lumbar M51.36    Chronic, continuous use of opioids F11.90    DDD (degenerative disc disease), cervical M50.30    Osteoarthritis of cervical spine M47.812    Medication monitoring encounter Z51.81    GERD (gastroesophageal reflux disease) K21.9    HTN (hypertension), benign I10    Mixed hyperlipidemia E78.2    Insomnia G47.00    Lumbosacral spondylosis without myelopathy M47.817    Sacroiliitis, not elsewhere classified ([facility] M46.1    Carpal tunnel syndrome G56.00    Mixed stress and urge urinary incontinence N39.46    Bilateral low back pain with sciatica M54.40    Intractable migraine with aura without status migrainosus G43.119    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    Anxiety and depression F41.9, F32.9    Chronic migraine without aura without status migrainosus, not intractable G43.709    Pulmonary embolism ([facility]) I26.99    Obesity, morbid, BMI 40.0-49.9 ([facility]) E66.01    Nodule of right lung R91.1    Neuropathy G62.9    Obstructive sleep apnea syndrome G47.33    Asthma with COPD ([facility] J44.9    Gastroesophageal reflux disease with esophagitis K21.0    Morbid obesity with BMI of 40.0-44.9, adult ([facility]) E66.01, Z68.41    Congestive heart failure ([facility]) I50.9    Permanent atrial fibrillation I48.21    Stage 3 chronic kidney disease ([facility]) N18.3    Benign hypertension with CKD (chronic kidney disease) stage III ([facility]) I12.9, N18.3    Secondary diabetes mellitus with stage 3 chronic kidney disease (GFR 30-59) ([facility]) E13.22, N18.3    CKD (chronic kidney disease) stage 3, GFR 30-59 ml/min ([facility]) N18.3    Episode of altered cognition R41.89    Chest pain R07.9    Paroxysmal atrial fibrillation ([facility]) I48.0    Lumbar radiculopathy, chronic M54.16       Isolation/Infection:   Isolation          No Isolation        Patient Infection Status     None to display          Nurse Assessment:  Last Vital Signs: /62   Pulse 80   Temp 97.4 °F (36.3 °C) (Oral)   Resp 16   Ht 5' 2\" (1.575 m)   Wt 250 lb (113.4 kg)   LMP  (LMP Unknown)   SpO2 95%   BMI 45.73 kg/m²     Last documented pain score (0-10 scale): Pain Level: 5  Last Weight:   Wt Readings from Last 1 Encounters:   04/22/20 250 lb (113.4 kg)     Mental Status:  oriented    IV Access:  - None    Nursing Mobility/ADLs:  Walking   Independent  Transfer  Independent  Bathing  Independent  Dressing  Independent  Toileting  Independent  Feeding  Independent  Med Admin  Independent  Med Delivery   whole    Wound Care Documentation and Therapy:        Elimination:  Continence:   · Bowel: No  · Bladder: No  Urinary Catheter: None   Colostomy/Ileostomy/Ileal Conduit: No       Date of Last BM: ***  No intake or output data in the 24 hours ending 04/23/20 0807  No intake/output data recorded. Safety Concerns:     None    Impairments/Disabilities:      None    Nutrition Therapy:  Current Nutrition Therapy:   - Oral Diet:  General    Routes of Feeding: Oral  Liquids: No Restrictions  Daily Fluid Restriction: no  Last Modified Barium Swallow with Video (Video Swallowing Test): not done    Treatments at the Time of Hospital Discharge:   Respiratory Treatments: ***  Oxygen Therapy:  is not on home oxygen therapy.   Ventilator:    - No ventilator support    Rehab Therapies: Physical Therapy and Occupational Therapy  Weight Bearing Status/Restrictions: No weight bearing

## 2020-04-23 NOTE — CONSULTS
[facility]    Consult                        Today's Date: 4/23/2020  Patient Name: Bill Victoria  Date of admission: 4/22/2020 12:57 PM  Patient's age: 78 y. o., 1950  Admission Dx: Chest pain [R07.9]  Chest pain [R07.9]    Reason for Consult:  Chest pain    Requesting Physician: Amber Ch MD    CHIEF COMPLAINT:  Chest pain    History Obtained From:  patient    HISTORY OF PRESENT ILLNESS:      The patient is a 78 y.o.  female who is admitted to the hospital for Chest pain  The patient presented with chest pain, achy, persistent since yesterday, more with deep breathing and coughing, no SOB, no dizziness or syncope. Past Medical History:   has a past medical history of Allergic rhinitis, Anxiety, Arthritis, Asthma, Atrial fibrillation ([address] Back pain, Benign hypertension with CKD (chronic kidney disease) stage III ([address] CAD (coronary artery disease), Caffeine use, CHF (congestive heart failure) ([address] Chronic kidney disease, CKD (chronic kidney disease) stage 3, GFR 30-59 ml/min ([facility]), COPD (chronic obstructive pulmonary disease) ([address] Degeneration of lumbar or lumbosacral intervertebral disc, Depression, DM (diabetes mellitus) ([address] Emphysema of lung ([address] Gastritis, GERD (gastroesophageal reflux disease), Hearing loss, Hematuria, Hiatal hernia, HTN (hypertension), benign, Hypertriglyceridemia, Incontinence of urine, Knee arthropathy, Lumbosacral spondylosis without myelopathy, Migraine headache, Mumps, Neuropathy, Obesity, On home oxygen therapy, HIGINIO on CPAP, Otitis media, Secondary diabetes mellitus with stage 3 chronic kidney disease (GFR 30-59) ([address] Stroke ([address] Tinnitus, Type 2 diabetes mellitus without complication ([address] Type II or unspecified type diabetes mellitus without mention of complication, not stated as uncontrolled, UTI (lower urinary tract infection), and Varicose vein.     Past Surgical History:   has a past surgical history that includes

## 2020-04-23 NOTE — H&P
(2015); Tympanoplasty; Colonoscopy; Colonoscopy (04/10/2017); pr colsc flx w/removal lesion by hot bx forceps (N/A, 4/10/2017); and Tympanostomy tube placement (2017). I have reviewed and agree with Surgical History entered    CURRENT MEDICATIONS     aspirin chewable tablet 81 mg, Daily  atorvastatin (LIPITOR) tablet 80 mg, Daily  carvedilol (COREG) tablet 3.125 mg, BID  citalopram (CELEXA) tablet 40 mg, Daily  clopidogrel (PLAVIX) tablet 75 mg, Daily  docusate sodium (COLACE) capsule 100 mg, BID  ferrous sulfate (FE TABS 325) EC tablet 325 mg, Daily with breakfast  gabapentin (NEURONTIN) capsule 300 mg, TID  losartan (COZAAR) tablet 25 mg, Daily  magnesium oxide (MAG-OX) tablet 200 mg, BID  metFORMIN (GLUCOPHAGE) tablet 1,000 mg, Daily with breakfast  nitroGLYCERIN (NITROSTAT) SL tablet 0.4 mg, Q5 Min PRN  pantoprazole (PROTONIX) tablet 40 mg, BID  budesonide-formoterol (SYMBICORT) 160-4.5 MCG/ACT inhaler 2 puff, BID  topiramate (TOPAMAX) tablet 100 mg, Nightly  insulin lispro (HUMALOG) injection vial 0-12 Units, TID WC  insulin lispro (HUMALOG) injection vial 0-6 Units, Nightly  acetaminophen (TYLENOL) tablet 1,000 mg, Q8H PRN  melatonin tablet 10 mg, Nightly        All medication charted and reviewed. ALLERGIES     is allergic to robitussin [guaifenesin]; codeine; compazine [prochlorperazine maleate]; iodides; morphine; moxifloxacin; reglan [metoclopramide]; avelox [moxifloxacin hcl in nacl]; cipro xr; and sulfa antibiotics. FAMILY HISTORY     She indicated that her mother is . She indicated that her father is . She indicated that her sister is . She indicated that the status of her maternal grandmother is unknown.     family history includes Diabetes in her maternal grandmother and mother; Emphysema in her sister; Heart Disease in her mother; Stomach Cancer in her father. The patient denies any pertinent family history.   I have reviewed and agree with the family

## 2020-04-23 NOTE — DISCHARGE SUMMARY
tablet  Commonly known as:  CELEXA  TAKE 1/2  TAB BY MOUTH TWICE  A DAY     clopidogrel 75 MG tablet  Commonly known as:  PLAVIX  TAKE 1 TAB BY MOUTH ONCE A DAY ( AM ) -THIS MEDICINE MAY BE TAKENWITH OR WITHOUT FOOD     Compression Stockings Misc  by Does not apply route Pressure between 20 - 25 .     docusate sodium 100 MG capsule  Commonly known as:  Colace  Take 1 capsule by mouth 2 times daily Please use while taking Percocet     ferrous sulfate 325 (65 Fe) MG tablet  Commonly known as:  IRON 325  TAKE 1 TAB BY MOUTH EVERY MORNING     furosemide 20 MG tablet  Commonly known as:  LASIX  Take 1 tablet by mouth daily as needed (weight gain 3 lbs overnight)     gabapentin 300 MG capsule  Commonly known as:  Neurontin  Take 1 capsule by mouth 3 times daily for 30 days. insulin aspart 100 UNIT/ML injection pen  Commonly known as:  NovoLOG FlexPen  If <139 - No Insulin; 140-199-2 Units;200-249-4 Units;250-299-6 Units;300-349-8 Units;350-400=10 Units; Above 400-12 Units     insulin glargine 100 UNIT/ML injection pen  Commonly known as:  Lantus SoloStar  Inject 42 Units into the skin 2 times daily     ipratropium-albuterol 0.5-2.5 (3) MG/3ML Soln nebulizer solution  Commonly known as:  DUONEB  Inhale 3 mLs into the lungs every 4 hours     lidocaine 4 % cream  Commonly known as:  LMX  Apply topically every 8 hrs as needed for pain     * Lift Chair Misc  by Does not apply route Use daily at home to help with ADL's     * Lift Chair Misc  by Does not apply route     losartan 25 MG tablet  Commonly known as:  COZAAR  TAKE 1 TAB BY MOUTH ONCE A DAY     magnesium oxide 400 MG tablet  Commonly known as:  MAG-OX  TAKE 1 TAB BY MOUTH TWICE A DAY ( AM AND BEDTIME )     Melatonin 10 MG Tabs  Take 10 mg by mouth nightly     metFORMIN 1000 MG tablet  Commonly known as:  GLUCOPHAGE  TAKE 1 TAB BY MOUTH TWICE A DAY ( AM AND BEDTIME )     * naloxone 4 MG/0.1ML Liqd nasal spray  1 spray by Nasal route as needed for Opioid Reversal

## 2020-04-23 NOTE — PROGRESS NOTES
[facility]  Documentation Note                Admission Dx: Chest pain [R07.9]  Chest pain [R07.9]    Past Medical History:   has a past medical history of Allergic rhinitis, Anxiety, Arthritis, Asthma, Atrial fibrillation ([facility] Back pain, Benign hypertension with CKD (chronic kidney disease) stage III ([facility] CAD (coronary artery disease), Caffeine use, CHF (congestive heart failure) ([facility] Chronic kidney disease, CKD (chronic kidney disease) stage 3, GFR 30-59 ml/min ([facility]), COPD (chronic obstructive pulmonary disease) ([facility] Degeneration of lumbar or lumbosacral intervertebral disc, Depression, DM (diabetes mellitus) ([facility] Emphysema of lung ([facility] Gastritis, GERD (gastroesophageal reflux disease), Hearing loss, Hematuria, Hiatal hernia, HTN (hypertension), benign, Hypertriglyceridemia, Incontinence of urine, Knee arthropathy, Lumbosacral spondylosis without myelopathy, Migraine headache, Mumps, Neuropathy, Obesity, On home oxygen therapy, HIGINIO on CPAP, Otitis media, Secondary diabetes mellitus with stage 3 chronic kidney disease (GFR 30-59) ([facility] Stroke ([facility] Tinnitus, Type 2 diabetes mellitus without complication ([facility] Type II or unspecified type diabetes mellitus without mention of complication, not stated as uncontrolled, UTI (lower urinary tract infection), and Varicose vein. Previous Testing:     STRESS 2/4/2020: No ischemia/infarct. EF 68%. ECHO 2/3/2020: EF 66%, grade I DD. CATH 2012: 60% small 1st diagonal branch disease of LAD on cardiac catheterization 2012, Normal LVEF 70%    Previous office/hospital visit:   Dr. Yoli Young. Marie Fetch 2/3/2020:   -Noncardiac chest pain, chest wall pain.   Reproducible on exam.  Constant for the past 1 to 2 days.  -Negative nuclear stress test on 6/2019  -LVEF on echo 6/2019  -CKD  -DM type II  -Dyslipidemia    Plan --  -Acute MI has been ruled out with troponins  -No further work-up is planned  -Treat musculoskeletal pain  -Can follow-up with primary cardiologist in 1 to 2 weeks    Home Medications:      Prior to Admission medications    Medication Sig Start Date End Date Taking? Authorizing Provider   oxyCODONE-acetaminophen (PERCOCET) 5-325 MG per tablet Take 1 tablet by mouth See Admin Instructions for 30 days. Intended supply: 30 days. 1 tab 3-4 times a day prn 4/6/20 5/6/20  Milagros Meneses MD   clopidogrel (PLAVIX) 75 MG tablet TAKE 1 TAB BY MOUTH ONCE A DAY ( AM ) -THIS MEDICINE MAY BE TAKENWITH OR WITHOUT FOOD 3/27/20   Bedford Galeazzi, MD   gabapentin (NEURONTIN) 300 MG capsule Take 1 capsule by mouth 3 times daily for 30 days. 3/2/20 4/1/20  VERONIKA Dumont CNP   topiramate (TOPAMAX) 100 MG tablet Take 1 tablet by mouth nightly 3/2/20   VERONIKA Dumont CNP   tobramycin-dexamethasone (TOBRADEX) 0.3-0.1 % ophthalmic suspension  1/30/20   Historical Provider, MD   insulin aspart (NOVOLOG FLEXPEN) 100 UNIT/ML injection pen If <139 - No Insulin; 140-199-2 Units;200-249-4 Units;250-299-6 Units;300-349-8 Units;350-400=10 Units; Above 400-12 Units 2/12/20   Bedford Galeazzi, MD   insulin glargine (LANTUS SOLOSTAR) 100 UNIT/ML injection pen Inject 42 Units into the skin 2 times daily 2/12/20   Bedford Galeazzi, MD   ranitidine (ZANTAC) 150 MG tablet TAKE 1 TAB BY MOUTH TWICE A DAY ( AM AND BEDTIME ) 1/24/20   Bedford Galeazzi, MD   aspirin 81 MG chewable tablet Take 1 tablet by mouth daily 12/3/19   Bedford Galeazzi, MD   atorvastatin (LIPITOR) 80 MG tablet Take 1 tablet by mouth daily 12/3/19   Bedford Galeazzi, MD   lidocaine (LMX) 4 % cream Apply topically every 8 hrs as needed for pain 11/11/19   Bedford Galeazzi, MD   naloxone 4 MG/0.1ML LIQD nasal spray 1 spray by Nasal route as needed for Opioid Reversal 10/10/19   Milagros Meneses MD   Lift Chair [id number] [facility] by Does not apply route 9/24/19   Bedford Galeazzi, MD   Misc.  Devices (ADJUST BATH/SHOWER SEAT/BACK) MISC Use daily for shower 9/24/19   Bedford Galeazzi, MD   naloxone 4 MG/0.1ML LIQD nasal spray 1 spray by Nasal route as needed for Opioid Reversal 9/13/19   Nan Yates MD   ferrous sulfate 325 (65 Fe) MG tablet TAKE 1 TAB BY MOUTH EVERY MORNING 8/8/19   Lor Larios MD   magnesium oxide (MAG-OX) 400 MG tablet TAKE 1 TAB BY MOUTH TWICE A DAY ( AM AND BEDTIME ) 8/8/19   Lor Larios MD   carvedilol (COREG) 3.125 MG tablet TAKE 1 TAB BY MOUTH TWICE A DAY ( AM AND BEDTIME ) 8/8/19   Lor Larios MD   metFORMIN (GLUCOPHAGE) 1000 MG tablet TAKE 1 TAB BY MOUTH TWICE A DAY ( AM AND BEDTIME ) 8/8/19   Lor Larios MD   ULTICARE MINI PEN NEEDLES 31G X 6 MM MISC USE AS DIRECTED 8/8/19   Lor Larios MD   citalopram (CELEXA) 40 MG tablet TAKE 1/2  TAB BY MOUTH TWICE  A DAY 4/2/19   Lor Larios MD   pantoprazole (PROTONIX) 40 MG tablet Take 1 tablet by mouth 2 times daily 3/15/19   Slava Pennington,    TRUE METRIX BLOOD GLUCOSE TEST strip THREE TIMES A DAY 12/13/18   Lor Larios MD   Alcohol Swabs (B-D SINGLE USE SWABS REGULAR) PADS THREE TIMES A DAY 12/12/18   Lor Larios MD   nystatin (MYCOSTATIN) 186014 UNIT/GM powder Apply 3 times daily.  12/10/18   Lor Larios MD   furosemide (LASIX) 20 MG tablet Take 1 tablet by mouth daily as needed (weight gain 3 lbs overnight) 11/16/18   Ashok Winston MD   losartan (COZAAR) 25 MG tablet TAKE 1 TAB BY MOUTH ONCE A DAY 11/6/18   Lor Larios MD   Lift Chair MISC by Does not apply route Use daily at home to help with ADL's 11/6/18   Lor Larios MD   nitroGLYCERIN (NITROSTAT) 0.4 MG SL tablet 1 under the tongue as needed for angina, may repeat q5mins for up three doses 8/28/18   Historical Provider, MD   Blood Glucose Monitoring Suppl HARMEET Use daily to check BS 3/27/18   Lor Larios MD   ipratropium-albuterol (DUONEB) 0.5-2.5 (3) MG/3ML SOLN nebulizer solution Inhale 3 mLs into the lungs every 4 hours 2/6/18   Modesto Hammonds MD   Melatonin 10 MG TABS Take 10 mg by mouth nightly 10/19/17   Lor Larios MD   Compression

## 2020-04-24 ENCOUNTER — CARE COORDINATION (OUTPATIENT)
Dept: CARE COORDINATION | Age: 70
End: 2020-04-24

## 2020-04-24 NOTE — CARE COORDINATION
Patient contacted regarding John Damon. Care Transition Nurse/ Ambulatory Care Manager contacted the patient by telephone to perform post discharge assessment. Verified name and  with patient as identifiers. Provided introduction to self, and explanation of the CTN/ACM role, and reason for call due to risk factors for infection and/or exposure to COVID-19. Symptoms reviewed with patient who verbalized the following symptoms: no new symptoms and no worsening symptoms. Due to no new or worsening symptoms encounter was not routed to provider for escalation. Patient has following risk factors of: CHF, COPD, diabetes, chronic kidney disease. CTN/ACM reviewed discharge instructions, medical action plan and red flags such as increased shortness of breath, increasing fever and signs of decompensation with patient who verbalized understanding. Discussed exposure protocols and quarantine with CDC Guidelines What to do if you are sick with coronavirus disease .  Patient was given an opportunity for questions and concerns. The patient agrees to contact the [facility] exposure line [phone], local [facility] department [place] Department of Health: ([phone]) and PCP office for questions related to their healthcare. CTN/ACM provided contact information for future needs. Reviewed and educated patient on any new and changed medications related to discharge diagnosis     Patient/family/caregiver given information for GetWell Loop and agrees to enroll no  Patient's preferred e-mail: na   Patient's preferred phone number: na   Based on Loop alert triggers, patient will be contacted by nurse care manager for worsening symptoms. Plan for follow-up call in 5-7 days based on severity of symptoms and risk factors. From CDC: Are you at higher risk for severe illness?  Wash your hands often.  Avoid close contact (6 feet, which is about two arm lengths) with people who are sick.    Put distance

## 2020-04-29 ENCOUNTER — HOSPITAL ENCOUNTER (OUTPATIENT)
Dept: PAIN MANAGEMENT | Age: 70
Discharge: HOME | End: 2020-04-29
Payer: COMMERCIAL

## 2020-04-29 PROCEDURE — 99442 PR PHYS/QHP TELEPHONE EVALUATION 11-20 MIN: CPT | Performed by: PAIN MEDICINE

## 2020-04-29 PROCEDURE — 99213 OFFICE O/P EST LOW 20 MIN: CPT

## 2020-04-29 RX ORDER — OXYCODONE HYDROCHLORIDE AND ACETAMINOPHEN 5; 325 MG/1; MG/1
1 TABLET ORAL SEE ADMIN INSTRUCTIONS
Qty: 100 TABLET | Refills: 0 | Status: SHIPPED | OUTPATIENT
Start: 2020-05-06 | End: 2020-06-04 | Stop reason: SDUPTHER

## 2020-04-29 ASSESSMENT — ENCOUNTER SYMPTOMS
BACK PAIN: 1
SHORTNESS OF BREATH: 1
COUGH: 0
EYE REDNESS: 0
PHOTOPHOBIA: 0

## 2020-04-29 NOTE — PROGRESS NOTES
changes,none  Patient currently home-maker and unemployed. Physical therapy did help the pain. Are you under psychological counseling at present: No  Goals for treatment include:  Decrease in pain to a level 3, Enjoy daily and recreational activities, return to previous status. Patient relates current medications are helping the pain. Patient reports taking pain medications as prescribed, denies obtaining medications from different sources and denies use of illegal drugs. Patient denies side effects from medications like nausea, vomiting, constipation or drowsiness. Patient reports current activities of daily living ar possible due to medications and would like to continue them. ADVERSE MEDICATION EFFECTS:   Nausea and vomiting: no   Constipation: no-Undercontrol-: not applicable  Dizziness/drowsy/sleepy--no  Urinary Retention: no    ACTIVITY/SOCIAL/EMOTIONAL:  Sleep Pattern: 7 hours per night. generally restful sleep  Home Exercises: daily housecleaning and therapy bands  Activity:unchanged  Emotional Issues: normal.   Currently seeing a Psychiatrist or Psychologist:  No      ABERRANT BEHAVIORS SINCE LAST VISIT  Lost rx/pills:------------------------------------------ no  Taking  medication as prescribed: ----------- yes  Urine Drug Screen ---------------------------------  yes  Recent ER visits: -------------------------------------Yes  Pill count is appropriate: ---------------------------not applicable   Refills for prescriptions appropriate:---------- yes      Past Medical History:   Diagnosis Date    Allergic rhinitis     Anxiety 7/17/2013    Arthritis     Asthma     Atrial fibrillation (HCC)     Back pain     NERVE/DR. GRACIA    Benign hypertension with CKD (chronic kidney disease) stage III (HCC)     CAD (coronary artery disease) 3/21/2013    Caffeine use     2 coffee/day    CHF (congestive heart failure) (HCC)     Chronic kidney disease     CKD (chronic kidney disease) stage 3, GFR TYMPANOPLASTY      TYMPANOSTOMY TUBE PLACEMENT  2017    UPPER GASTROINTESTINAL ENDOSCOPY  2016    Dr Rhea Walker       Family History   Problem Relation Age of Onset    Diabetes Mother     Heart Disease Mother     Stomach Cancer Father     Diabetes Maternal Grandmother         also aunts and uncles (maternal)    Emphysema Sister        Social History     Socioeconomic History    Marital status: Legally      Spouse name: Not on file    Number of children: Not on file    Years of education: Not on file    Highest education level: Not on file   Occupational History    Occupation: disabled     Employer: N/A   Social Needs    Financial resource strain: Not on file    Food insecurity     Worry: Not on file     Inability: Not on file    Transportation needs     Medical: Not on file     Non-medical: Not on file   Tobacco Use    Smoking status: Former Smoker     Packs/day: 3.00     Years: 41.00     Pack years: 123.00     Types: Cigarettes     Last attempt to quit: 2000     Years since quittin.3    Smokeless tobacco: Never Used    Tobacco comment: quit in    Substance and Sexual Activity    Alcohol use: No     Alcohol/week: 0.0 standard drinks    Drug use: No    Sexual activity: Not Currently   Lifestyle    Physical activity     Days per week: Not on file     Minutes per session: Not on file    Stress: Not on file   Relationships    Social connections     Talks on phone: Not on file     Gets together: Not on file     Attends Holiness service: Not on file     Active member of club or organization: Not on file     Attends meetings of clubs or organizations: Not on file     Relationship status: Not on file    Intimate partner violence     Fear of current or ex partner: Not on file     Emotionally abused: Not on file     Physically abused: Not on file     Forced sexual activity: Not on file   Other Topics Concern    Not on file   Social History Narrative    Not on file Allergies   Allergen Reactions    Robitussin [Guaifenesin] Anaphylaxis    Codeine Swelling    Compazine [Prochlorperazine Maleate] Hives and Swelling    Iodides Itching    Morphine Other (See Comments)     hallucinations    Moxifloxacin      Other reaction(s): Intolerance-unknown  Other reaction(s): Intolerance-unknown    Reglan [Metoclopramide] Nausea Only    Avelox [Moxifloxacin Hcl In Nacl] Rash     Dermatitis      Cipro Xr Rash     dermatitis    Sulfa Antibiotics Rash       Current Outpatient Medications on File Prior to Encounter   Medication Sig Dispense Refill    clopidogrel (PLAVIX) 75 MG tablet TAKE 1 TAB BY MOUTH ONCE A DAY ( AM ) -THIS MEDICINE MAY BE TAKENWITH OR WITHOUT FOOD 90 tablet 1    gabapentin (NEURONTIN) 300 MG capsule Take 1 capsule by mouth 3 times daily for 30 days. 90 capsule 0    topiramate (TOPAMAX) 100 MG tablet Take 1 tablet by mouth nightly 90 tablet 1    tobramycin-dexamethasone (TOBRADEX) 0.3-0.1 % ophthalmic suspension       insulin aspart (NOVOLOG FLEXPEN) 100 UNIT/ML injection pen If <139 - No Insulin; 140-199-2 Units;200-249-4 Units;250-299-6 Units;300-349-8 Units;350-400=10 Units; Above 400-12 Units 5 pen 3    insulin glargine (LANTUS SOLOSTAR) 100 UNIT/ML injection pen Inject 42 Units into the skin 2 times daily 5 pen 3    ranitidine (ZANTAC) 150 MG tablet TAKE 1 TAB BY MOUTH TWICE A DAY ( AM AND BEDTIME ) 180 tablet 0    aspirin 81 MG chewable tablet Take 1 tablet by mouth daily 90 tablet 3    atorvastatin (LIPITOR) 80 MG tablet Take 1 tablet by mouth daily 90 tablet 3    lidocaine (LMX) 4 % cream Apply topically every 8 hrs as needed for pain 45 g 3    naloxone 4 MG/0.1ML LIQD nasal spray 1 spray by Nasal route as needed for Opioid Reversal 1 each 5    Lift Chair MISC by Does not apply route 1 each 0    Misc.  Devices (ADJUST BATH/SHOWER SEAT/BACK) MISC Use daily for shower 1 each 0    naloxone 4 MG/0.1ML LIQD nasal spray 1 spray by Nasal route as facility-administered medications on file prior to encounter. Review of Systems   Constitutional: Negative for activity change, appetite change, diaphoresis and unexpected weight change. HENT: Negative for congestion and hearing loss. Eyes: Negative for photophobia, redness and visual disturbance. Respiratory: Positive for shortness of breath. Negative for cough. Emphysema   Cardiovascular: Positive for chest pain and palpitations. Negative for leg swelling. Irregular heart beat   Gastrointestinal: Negative. Negative for abdominal pain and constipation. Endocrine: Negative for heat intolerance and polyuria. Genitourinary: Negative for dysuria and hematuria. Musculoskeletal: Positive for back pain. Negative for arthralgias. Skin: Negative. Neurological: Positive for weakness. Negative for tingling and numbness. Hematological: Bruises/bleeds easily. Psychiatric/Behavioral: Negative for confusion. The patient is nervous/anxious. Physical Exam  Skin:         Neurological:      Mental Status: She is alert and oriented to person, place, and time. Psychiatric:         Mood and Affect: Mood normal.        DATA:  LAB.:  4/23/2020  5:58 AM - [place], pn Incoming Lab Results From [facility]     Component Value Ref Range & Units Status Collected Lab   Glucose 247High   70 - 99 mg/dL Final 04/23/2020  5:04  [address]   BUN 23  8 - 23 mg/dL Final 04/23/2020  5:04  [address]   0.50 - 0.90 mg/dL Final 04/23/2020  5:04  [address]   Bun/Cre Ratio NOT REPORTED  9 - 20 Final 04/23/2020  5:04  [address]   Calcium 9.8  8.6 - 10.4 mg/dL Final 04/23/2020  5:04  [address]   Sodium 138  135 - 144 mmol/L Final 04/23/2020  5:04  [address]   Potassium 4.5  3.7 - 5.3 mmol/L Final 04/23/2020  5:04  [address]   Chloride 100  98 - 107

## 2020-04-30 ENCOUNTER — CARE COORDINATION (OUTPATIENT)
Dept: CARE COORDINATION | Age: 70
End: 2020-04-30

## 2020-04-30 ASSESSMENT — ENCOUNTER SYMPTOMS
ABDOMINAL PAIN: 0
CONSTIPATION: 0
GASTROINTESTINAL NEGATIVE: 1

## 2020-05-08 ENCOUNTER — CARE COORDINATION (OUTPATIENT)
Dept: CARE COORDINATION | Age: 70
End: 2020-05-08

## 2020-05-11 NOTE — ED PROVIDER NOTES
Emergency Medicine Attending Note    I have seen and examined the patient in conjunction with the Resident/MLP. Please see my key portion documented:      I agree with the assessment and plan as discussed with Dr. Zeferino Hamm. Electronically signed:  Leonie Mc M.D.            Hilda Prajapati MD  05/11/20 [id number]

## 2020-06-02 ENCOUNTER — HOSPITAL ENCOUNTER (OUTPATIENT)
Dept: PAIN MANAGEMENT | Age: 70
Discharge: HOME | End: 2020-06-02
Payer: COMMERCIAL

## 2020-06-02 PROCEDURE — 99442 PR PHYS/QHP TELEPHONE EVALUATION 11-20 MIN: CPT | Performed by: NURSE PRACTITIONER

## 2020-06-02 PROCEDURE — 99213 OFFICE O/P EST LOW 20 MIN: CPT

## 2020-06-02 ASSESSMENT — ENCOUNTER SYMPTOMS
HEARTBURN: 1
EYES NEGATIVE: 1
BACK PAIN: 1

## 2020-06-02 NOTE — PROGRESS NOTES
Urine Not Detected   Final 01/06/2020 11:40 AM [facility]   Marijuana Metab, Ur Not Detected   Final 01/06/2020 11:40 AM [facility]   MDEA, ALMA, Ur Not Detected   Final 01/06/2020 11:40 AM [facility]   MDMA, Urine Not Detected   Final 01/06/2020 11:40 AM [facility]   Meperidine Metab, Ur Not Detected   Final 01/06/2020 11:40 AM [facility]   Methadone, Urine Not Detected   Final 01/06/2020 11:40 AM [facility]   Methamphetamine, Urine Not Detected   Final 01/06/2020 11:40 AM [facility]   Methylphenidate Not Detected   Final 01/06/2020 11:40 AM [facility]   Midazolam, Urine Not Detected   Final 01/06/2020 11:40 AM [facility]   Morphine Urine Not Detected   Final 01/06/2020 11:40 AM [facility]   Norbuprenorphine, Urine Not Detected   Final 01/06/2020 11:40 AM [facility]   Nordiazepam, Urine Not Detected   Final 01/06/2020 11:40 AM [facility]   Norfentanyl, Urine Not Detected   Final 01/06/2020 11:40 AM [facility]   NORHYDROCODONE, URINE Not Detected   Final 01/06/2020 11:40 AM [facility]   Noroxycodone, Urine Present   Final 01/06/2020 11:40 AM [facility]   NOROXYMORPHONE, URINE Not Detected   Final 01/06/2020 11:40 AM [facility]   Oxazepam, Urine Not Detected   Final 01/06/2020 11:40 AM [facility]   Oxycodone Urine Present   Final 01/06/2020 11:40 AM [facility]   Oxymorphone, Urine Not Detected   Final 01/06/2020 11:40 AM [facility]   PCP, Urine Not Detected   Final 01/06/2020 11:40 AM [facility]   Phentermine, Ur Not Detected   Final 01/06/2020 11:40 AM [facility]   Propoxyphene, Urine Not Detected   Final 01/06/2020 11:40 AM [facility]   Tapentadol-O-Sulfate, Urine Not Detected   Final 01/06/2020 11:40 AM [facility]   Tapentadol, Urine Not Detected   Final 01/06/2020 11:40 AM [facility]   Temazepam, Urine Not Detected   Final 01/06/2020 11:40 AM [facility]   Tramadol, Urine Not Detected   Final 01/06/2020 11:40 AM [facility]   Zolpidem, Urine Not Detected   Final 01/06/2020 11:40 AM [facility]   Drugs Expected, Ur   Final 01/06/2020 11:40 AM - [facility]   PERCOCET ON 1/6/2020 AT AM    Creatinine, Ur 236.1  20.0 - 400.0 mg/dL Final 01/06/2020 11:40 AM [facility] Medication monitoring encounter (Chronic)    Lumbosacral spondylosis without myelopathy (Chronic)    Lumbar radiculopathy, chronic    DDD (degenerative disc disease), lumbar    DDD (degenerative disc disease), cervical (Chronic)    Bilateral low back pain with sciatica            Treatment Plan:  DISCUSSION: Treatment options discussed withpatient and all questions answered to patient's satisfaction. Possible side effects, risk of tolerance and or dependence and alternative treatments discussed    Obtaining appropriate analgesic effect of treatment   No signs of potential drug abuse or diversion identified    [x] Ill effects of being on chronic pain medications such as sleep disturbances, respiratory depression, hormonal changes, withdrawal symptoms, chronic opioid dependence and tolerance as well as risk of taking opioids with Benzodiazepines and taking opioids along with alcohol,  werediscussed with patient. I had asked the patient to minimize medication use and utilize pain medications only for uncontrolled rest pain or pain with exertional activities. I advised patient not to self-escalate painmedications without consulting with us. At each of patient's future visits we will try to taper pain medications, while adjusting the adjunct medications, and re-evaluating for Physical Therapy to improve spinal andjoint strength. We will continue to have discussions to decrease pain medications as tolerated. Counseled patient on effects their pain medication and /or their medical condition mayhave on their  ability to drive or operate machinery. Instructed not to drive or operate machinery if drowsy     I also discussed with the patient regarding the dangers of combining narcotic pain medication with tranquilizers, alcohol or illegal drugs or taking the medication any way other than prescribed. The dangers were discussed  including respiratory depression and death.  Patient was told to tell  all  physicians regarding the medications he is getting from pain clinic. Patient is warned not to take any unprescribed medications over-the-countermedications that can depress breathing . Patient is advised to talk to the pharmacist or physicians if planning to take any over-the-counter medications before  takeing them. Patient is strongly advised to avoid tranquilizers or  relaxants, illegal drugs  or any medications that can depress breathing  Patient is also advised to tell us if there is any changes in their medications from other physicians.     Location:  patient  at    home   , provider working from home  Phone call: 15 minutes    TREATMENT OPTIONS:       Medication Agreement Requirements Met  Continue Opioid therapy  Script written for percocet  Follow up appointment made

## 2020-06-04 DIAGNOSIS — M47.817 LUMBOSACRAL SPONDYLOSIS WITHOUT MYELOPATHY: Chronic | ICD-10-CM

## 2020-06-04 DIAGNOSIS — M50.30 DDD (DEGENERATIVE DISC DISEASE), CERVICAL: Chronic | ICD-10-CM

## 2020-06-04 DIAGNOSIS — M46.1 SACROILIITIS, NOT ELSEWHERE CLASSIFIED (HCC): ICD-10-CM

## 2020-06-04 DIAGNOSIS — M54.16 LUMBAR RADICULOPATHY, CHRONIC: ICD-10-CM

## 2020-06-04 RX ORDER — OXYCODONE HYDROCHLORIDE AND ACETAMINOPHEN 5; 325 MG/1; MG/1
1 TABLET ORAL SEE ADMIN INSTRUCTIONS
Qty: 100 TABLET | Refills: 0 | Status: SHIPPED | OUTPATIENT
Start: 2020-06-05 | End: 2020-06-30 | Stop reason: SDUPTHER

## 2020-06-25 ENCOUNTER — OFFICE VISIT (OUTPATIENT)
Dept: FAMILY MEDICINE CLINIC | Age: 70
End: 2020-06-25
Payer: COMMERCIAL

## 2020-06-25 VITALS
SYSTOLIC BLOOD PRESSURE: 118 MMHG | DIASTOLIC BLOOD PRESSURE: 80 MMHG | BODY MASS INDEX: 45.73 KG/M2 | OXYGEN SATURATION: 95 % | HEIGHT: 62 IN | HEART RATE: 72 BPM

## 2020-06-25 PROBLEM — K63.5 SESSILE COLONIC POLYP: Status: ACTIVE | Noted: 2020-06-25

## 2020-06-25 PROBLEM — R07.9 CHEST PAIN: Status: RESOLVED | Noted: 2020-02-02 | Resolved: 2020-06-25

## 2020-06-25 PROBLEM — R10.9 ABDOMINAL SPASMS: Status: ACTIVE | Noted: 2020-06-25

## 2020-06-25 PROCEDURE — 1036F TOBACCO NON-USER: CPT | Performed by: FAMILY MEDICINE

## 2020-06-25 PROCEDURE — G8417 CALC BMI ABV UP PARAM F/U: HCPCS | Performed by: FAMILY MEDICINE

## 2020-06-25 PROCEDURE — 1123F ACP DISCUSS/DSCN MKR DOCD: CPT | Performed by: FAMILY MEDICINE

## 2020-06-25 PROCEDURE — 3017F COLORECTAL CA SCREEN DOC REV: CPT | Performed by: FAMILY MEDICINE

## 2020-06-25 PROCEDURE — 1090F PRES/ABSN URINE INCON ASSESS: CPT | Performed by: FAMILY MEDICINE

## 2020-06-25 PROCEDURE — 2022F DILAT RTA XM EVC RTNOPTHY: CPT | Performed by: FAMILY MEDICINE

## 2020-06-25 PROCEDURE — 4040F PNEUMOC VAC/ADMIN/RCVD: CPT | Performed by: FAMILY MEDICINE

## 2020-06-25 PROCEDURE — 3052F HG A1C>EQUAL 8.0%<EQUAL 9.0%: CPT | Performed by: FAMILY MEDICINE

## 2020-06-25 PROCEDURE — G8427 DOCREV CUR MEDS BY ELIG CLIN: HCPCS | Performed by: FAMILY MEDICINE

## 2020-06-25 PROCEDURE — 99215 OFFICE O/P EST HI 40 MIN: CPT | Performed by: FAMILY MEDICINE

## 2020-06-25 PROCEDURE — G8399 PT W/DXA RESULTS DOCUMENT: HCPCS | Performed by: FAMILY MEDICINE

## 2020-06-25 RX ORDER — NYSTATIN 100000 [USP'U]/G
POWDER TOPICAL
Qty: 3 BOTTLE | Refills: 3 | Status: SHIPPED | OUTPATIENT
Start: 2020-06-25 | End: 2022-07-25

## 2020-06-25 RX ORDER — DICYCLOMINE HYDROCHLORIDE 10 MG/1
10 CAPSULE ORAL 2 TIMES DAILY PRN
Qty: 60 CAPSULE | Refills: 0 | Status: SHIPPED | OUTPATIENT
Start: 2020-06-25 | End: 2020-11-25 | Stop reason: SDUPTHER

## 2020-06-25 ASSESSMENT — PATIENT HEALTH QUESTIONNAIRE - PHQ9
1. LITTLE INTEREST OR PLEASURE IN DOING THINGS: 0
2. FEELING DOWN, DEPRESSED OR HOPELESS: 1
SUM OF ALL RESPONSES TO PHQ QUESTIONS 1-9: 1
SUM OF ALL RESPONSES TO PHQ9 QUESTIONS 1 & 2: 1
SUM OF ALL RESPONSES TO PHQ QUESTIONS 1-9: 1

## 2020-06-25 ASSESSMENT — ENCOUNTER SYMPTOMS
SHORTNESS OF BREATH: 0
CONSTIPATION: 0
ABDOMINAL DISTENTION: 0
COLOR CHANGE: 0
DIARRHEA: 0
PHOTOPHOBIA: 0
NAUSEA: 0
COUGH: 0
ABDOMINAL PAIN: 1
ANAL BLEEDING: 0
BLOOD IN STOOL: 0
BACK PAIN: 1
VOMITING: 0
RHINORRHEA: 0
RECTAL PAIN: 0

## 2020-06-25 NOTE — PROGRESS NOTES
other Troponin methodologies. Patients with high levels of Biotin oral intake (i.e >5mg/day) may have falsely decreased   Troponin levels. Samples collected within 8 hours of biotin intake may require additional   information for diagnosis.  Troponin T 04/22/2020 NOT REPORTED  <0.03 ng/mL Final    Troponin Interp 04/22/2020 NOT REPORTED   Final    Troponin, High Sensitivity 04/22/2020 6  0 - 14 ng/L Final    Comment:       High Sensitivity Troponin values cannot be compared with other Troponin methodologies. Patients with high levels of Biotin oral intake (i.e >5mg/day) may have falsely decreased   Troponin levels. Samples collected within 8 hours of biotin intake may require additional   information for diagnosis.  Troponin T 04/22/2020 NOT REPORTED  <0.03 ng/mL Final    Troponin Interp 04/22/2020 NOT REPORTED   Final    Ferritin 04/22/2020 91  13 - 150 ug/L Final    D-Dimer, Quant 04/22/2020 0.66  mg/L FEU Final    Comment:        When combined with a low clinical probability, a D dimer value of <0.50 mg/L FEU is   considered negative for DVT and PE (negative predictive value of 98%, sensitivity of 97%). If this test is not being used to help rule out DVT and PE, then the following reference   range should be utilized: 0.00 - 1.02 mg/L FEU. The [employer] D-Dimer assay is intended for use as an aid in the diagnosis of venous   thromboembolism (DVT and PE) and the results should be interpreted in conjunction with the   patient's medical history, clinical presentation, and other findings.   Elevated levels of D-dimer activity can be seen in any state of coagulation activation and   is not recommended in patients with therapeutic dose anticoagulant therapy for >24 hours,   fibrinolytic therapy within the previous 7 days, trauma or surgery within the previous 4   weeks,   disseminated malignancies, aortic aneurysm, sepsis, severe infections, pneumonia, severe   skin infections, liver cirrhosis, advance                           d age, coronary disease, diabetes, and pregnancy. A very low percentage of patients with DVT may yield D-dimer results below the cutoff of   0.5 mg/L FEU. This is known to be more prevalent in patients with distal DVT.  CRP 04/22/2020 2.4  0.0 - 5.0 mg/L Final    Platelet, Fluorescence 04/22/2020 Platelet clumps present, count appears adequate. 138 - 453 k/uL Final    Specimen Source 04/22/2020 . BLOOD   Final   Oriaaron Haus Ordered Test 04/22/2020 LD   Final    Reason for Rejection 04/22/2020 Unable to perform testing: Specimen quantity not sufficient. Final    - 04/22/2020 NOT REPORTED   Final    LD 04/22/2020 134* 135 - 214 U/L Final    Troponin, High Sensitivity 04/23/2020 7  0 - 14 ng/L Final    Comment:       High Sensitivity Troponin values cannot be compared with other Troponin methodologies. Patients with high levels of Biotin oral intake (i.e >5mg/day) may have falsely decreased   Troponin levels. Samples collected within 8 hours of biotin intake may require additional   information for diagnosis.       Troponin T 04/23/2020 NOT REPORTED  <0.03 ng/mL Final    Troponin Interp 04/23/2020 NOT REPORTED   Final    POC Glucose 04/22/2020 197* 65 - 105 mg/dL Final    POC Glucose 04/22/2020 245* 65 - 105 mg/dL Final    Critical Noted    Ventricular Rate 04/23/2020 71  BPM Final    Atrial Rate 04/23/2020 71  BPM Final    P-R Interval 04/23/2020 156  ms Final    QRS Duration 04/23/2020 78  ms Final    Q-T Interval 04/23/2020 394  ms Final    QTc Calculation (Bazett) 04/23/2020 428  ms Final    P Axis 04/23/2020 71  degrees Final    R Axis 04/23/2020 -33  degrees Final    T Axis 04/23/2020 73  degrees Final    Glucose 04/23/2020 247* 70 - 99 mg/dL Final    BUN 04/23/2020 23  8 - 23 mg/dL Final    CREATININE 04/23/2020 1.16* 0.50 - 0.90 mg/dL Final    Bun/Cre Ratio 04/23/2020 NOT REPORTED  9 - 20 Final    Calcium 04/23/2020 9.8  8.6 Counseling given: Not Answered  Comment: quit in 2000        Family History   Problem Relation Age of Onset    Diabetes Mother     Heart Disease Mother     Stomach Cancer Father     Diabetes Maternal Grandmother         also aunts and uncles (maternal)    Emphysema Sister              -rest of complaints with corresponding details per ROS    The patient's past medical, surgical, social, and family history as well as her current medications and allergies were reviewed as documented intoday's encounter. Review of Systems   Constitutional: Negative for activity change, appetite change, diaphoresis, fever and unexpected weight change. HENT: Negative for congestion, ear pain, postnasal drip and rhinorrhea. Eyes: Negative for photophobia and visual disturbance. Respiratory: Negative for cough and shortness of breath. Cardiovascular: Negative for chest pain, palpitations and leg swelling. Gastrointestinal: Positive for abdominal pain. Negative for abdominal distention, anal bleeding, blood in stool, constipation, diarrhea, nausea, rectal pain and vomiting. Endocrine: Negative for polyphagia and polyuria. Genitourinary: Negative for difficulty urinating, frequency, menstrual problem, pelvic pain and vaginal pain. Musculoskeletal: Positive for arthralgias, back pain, gait problem, joint swelling, myalgias, neck pain and neck stiffness. Skin: Negative for color change, rash and wound. Neurological: Positive for weakness and numbness. Negative for speech difficulty, light-headedness and headaches. Psychiatric/Behavioral: Negative for decreased concentration, dysphoric mood and sleep disturbance. The patient is not nervous/anxious. Physical Exam  Vitals signs and nursing note reviewed. Constitutional:       Appearance: She is obese. She is not ill-appearing, toxic-appearing or diaphoretic. HENT:      Head: Normocephalic and atraumatic.       Right Ear: Tympanic membrane continue sliding scale. - Hemoglobin A1C; Future    2. Mixed hyperlipidemia  Stable continue same medications labs reprinted    3. HTN (hypertension), benign  Controlled continue same medications    4. Lung nodule  Discussed with patient about the CT lung findings, PET scan ordered, CT chest faxed to pulmonologist.  - PET CT Skull Base To Mid Thigh; Future  - Vitamin B12 & Folate; Future  - Vitamin D 25 Hydroxy; Future    5. Permanent atrial fibrillation  Stable continue same medications    6. Abdominal spasms  We will try Bentyl as needed continue Prilosec  - dicyclomine (BENTYL) 10 MG capsule; Take 1 capsule by mouth 2 times daily as needed (abdominal spasm)  Dispense: 60 capsule; Refill: 0    7. Sessile colonic polyp  Discussed with patient you need to schedule colonoscopy    8. Obstructive sleep apnea syndrome  Continue using CPAP    9. Stage 3 chronic kidney disease (HCC)  Stable continue to monitor, avoid NSAIDs  - Vitamin B12 & Folate; Future  - Vitamin D 25 Hydroxy; Future    10. Perineal irritation in female    - nystatin (MYCOSTATIN) 733442 UNIT/GM powder; Apply 3 times daily. Dispense: 3 Bottle;  Refill: 3      Orders Placed This Encounter   Procedures    PET CT Skull Base To Mid Thigh     Standing Status:   Future     Standing Expiration Date:   6/25/2021     Order Specific Question:   Reason for exam:     Answer:   lung nodule    Hemoglobin A1C     Standing Status:   Future     Standing Expiration Date:   6/25/2021    Vitamin B12 & Folate     Standing Status:   Future     Standing Expiration Date:   6/25/2021    Vitamin D 25 Hydroxy     Standing Status:   Future     Standing Expiration Date:   6/25/2021         Medications Discontinued During This Encounter   Medication Reason    nystatin (MYCOSTATIN) 988578 UNIT/GM powder [facility] received counseling on the following healthy behaviors: nutrition, exercise and medication adherence  Reviewed prior labs and health

## 2020-06-30 ENCOUNTER — HOSPITAL ENCOUNTER (OUTPATIENT)
Age: 70
Setting detail: OBSERVATION
Discharge: HOME | End: 2020-07-02
Attending: EMERGENCY MEDICINE | Admitting: INTERNAL MEDICINE
Payer: COMMERCIAL

## 2020-06-30 ENCOUNTER — APPOINTMENT (OUTPATIENT)
Dept: GENERAL RADIOLOGY | Age: 70
End: 2020-06-30
Payer: COMMERCIAL

## 2020-06-30 ENCOUNTER — HOSPITAL ENCOUNTER (OUTPATIENT)
Dept: PAIN MANAGEMENT | Age: 70
Discharge: HOME | End: 2020-06-30
Payer: COMMERCIAL

## 2020-06-30 LAB
ABSOLUTE EOS #: 0.15 K/UL (ref 0–0.44)
ABSOLUTE IMMATURE GRANULOCYTE: <0.03 K/UL (ref 0–0.3)
ABSOLUTE LYMPH #: 2.54 K/UL (ref 1.1–3.7)
ABSOLUTE MONO #: 0.71 K/UL (ref 0.1–1.2)
ALBUMIN SERPL-MCNC: 3.8 G/DL (ref 3.5–5.2)
ALBUMIN/GLOBULIN RATIO: 1.4 (ref 1–2.5)
ALP BLD-CCNC: 67 U/L (ref 35–104)
ALT SERPL-CCNC: 23 U/L (ref 5–33)
ANION GAP SERPL CALCULATED.3IONS-SCNC: 13 MMOL/L (ref 9–17)
AST SERPL-CCNC: 27 U/L
BASOPHILS # BLD: 1 % (ref 0–2)
BASOPHILS ABSOLUTE: 0.06 K/UL (ref 0–0.2)
BILIRUB SERPL-MCNC: 0.16 MG/DL (ref 0.3–1.2)
BNP INTERPRETATION: NORMAL
BUN BLDV-MCNC: 22 MG/DL (ref 8–23)
BUN/CREAT BLD: ABNORMAL (ref 9–20)
C-REACTIVE PROTEIN: 1.6 MG/L (ref 0–5)
CALCIUM SERPL-MCNC: 9.2 MG/DL (ref 8.6–10.4)
CHLORIDE BLD-SCNC: 99 MMOL/L (ref 98–107)
CO2: 25 MMOL/L (ref 20–31)
CREAT SERPL-MCNC: 1.06 MG/DL (ref 0.5–0.9)
D-DIMER QUANTITATIVE: 0.62 MG/L FEU
DIFFERENTIAL TYPE: NORMAL
EOSINOPHILS RELATIVE PERCENT: 2 % (ref 1–4)
GFR AFRICAN AMERICAN: >60 ML/MIN
GFR NON-AFRICAN AMERICAN: 51 ML/MIN
GFR SERPL CREATININE-BSD FRML MDRD: ABNORMAL ML/MIN/{1.73_M2}
GFR SERPL CREATININE-BSD FRML MDRD: ABNORMAL ML/MIN/{1.73_M2}
GLUCOSE BLD-MCNC: 69 MG/DL (ref 70–99)
HCT VFR BLD CALC: 45.7 % (ref 36.3–47.1)
HEMOGLOBIN: 14.2 G/DL (ref 11.9–15.1)
IMMATURE GRANULOCYTES: 0 %
LACTATE DEHYDROGENASE: 172 U/L (ref 135–214)
LIPASE: 50 U/L (ref 13–60)
LYMPHOCYTES # BLD: 29 % (ref 24–43)
MCH RBC QN AUTO: 30.8 PG (ref 25.2–33.5)
MCHC RBC AUTO-ENTMCNC: 31.1 G/DL (ref 28.4–34.8)
MCV RBC AUTO: 99.1 FL (ref 82.6–102.9)
MONOCYTES # BLD: 8 % (ref 3–12)
NRBC AUTOMATED: 0 PER 100 WBC
PDW BLD-RTO: 13 % (ref 11.8–14.4)
PLATELET # BLD: 213 K/UL (ref 138–453)
PLATELET ESTIMATE: NORMAL
PMV BLD AUTO: 11.5 FL (ref 8.1–13.5)
POTASSIUM SERPL-SCNC: 4.5 MMOL/L (ref 3.7–5.3)
PRO-BNP: 132 PG/ML
RBC # BLD: 4.61 M/UL (ref 3.95–5.11)
RBC # BLD: NORMAL 10*6/UL
SEG NEUTROPHILS: 60 % (ref 36–65)
SEGMENTED NEUTROPHILS ABSOLUTE COUNT: 5.33 K/UL (ref 1.5–8.1)
SODIUM BLD-SCNC: 137 MMOL/L (ref 135–144)
TOTAL PROTEIN: 6.6 G/DL (ref 6.4–8.3)
TROPONIN INTERP: NORMAL
TROPONIN INTERP: NORMAL
TROPONIN T: NORMAL NG/ML
TROPONIN T: NORMAL NG/ML
TROPONIN, HIGH SENSITIVITY: 7 NG/L (ref 0–14)
TROPONIN, HIGH SENSITIVITY: 8 NG/L (ref 0–14)
TSH SERPL DL<=0.05 MIU/L-ACNC: 2.14 MIU/L (ref 0.3–5)
WBC # BLD: 8.8 K/UL (ref 3.5–11.3)
WBC # BLD: NORMAL 10*3/UL

## 2020-06-30 PROCEDURE — 71046 X-RAY EXAM CHEST 2 VIEWS: CPT

## 2020-06-30 PROCEDURE — 99442 PR PHYS/QHP TELEPHONE EVALUATION 11-20 MIN: CPT | Performed by: NURSE PRACTITIONER

## 2020-06-30 PROCEDURE — 80053 COMPREHEN METABOLIC PANEL: CPT

## 2020-06-30 PROCEDURE — 85379 FIBRIN DEGRADATION QUANT: CPT

## 2020-06-30 PROCEDURE — 83880 ASSAY OF NATRIURETIC PEPTIDE: CPT

## 2020-06-30 PROCEDURE — 99285 EMERGENCY DEPT VISIT HI MDM: CPT

## 2020-06-30 PROCEDURE — 93005 ELECTROCARDIOGRAM TRACING: CPT | Performed by: STUDENT IN AN ORGANIZED HEALTH CARE EDUCATION/TRAINING PROGRAM

## 2020-06-30 PROCEDURE — 86140 C-REACTIVE PROTEIN: CPT

## 2020-06-30 PROCEDURE — 6370000000 HC RX 637 (ALT 250 FOR IP): Performed by: STUDENT IN AN ORGANIZED HEALTH CARE EDUCATION/TRAINING PROGRAM

## 2020-06-30 PROCEDURE — 84443 ASSAY THYROID STIM HORMONE: CPT

## 2020-06-30 PROCEDURE — 85025 COMPLETE CBC W/AUTO DIFF WBC: CPT

## 2020-06-30 PROCEDURE — 83615 LACTATE (LD) (LDH) ENZYME: CPT

## 2020-06-30 PROCEDURE — 83690 ASSAY OF LIPASE: CPT

## 2020-06-30 PROCEDURE — 99213 OFFICE O/P EST LOW 20 MIN: CPT

## 2020-06-30 PROCEDURE — 84484 ASSAY OF TROPONIN QUANT: CPT

## 2020-06-30 RX ORDER — ACETAMINOPHEN 500 MG
1000 TABLET ORAL ONCE
Status: COMPLETED | OUTPATIENT
Start: 2020-06-30 | End: 2020-06-30

## 2020-06-30 RX ORDER — NITROGLYCERIN 0.4 MG/1
0.4 TABLET SUBLINGUAL EVERY 5 MIN PRN
Status: DISCONTINUED | OUTPATIENT
Start: 2020-06-30 | End: 2020-07-01 | Stop reason: SDUPTHER

## 2020-06-30 RX ORDER — OXYCODONE HYDROCHLORIDE AND ACETAMINOPHEN 5; 325 MG/1; MG/1
1 TABLET ORAL SEE ADMIN INSTRUCTIONS
Qty: 100 TABLET | Refills: 0 | Status: SHIPPED | OUTPATIENT
Start: 2020-07-03 | End: 2020-07-30 | Stop reason: SDUPTHER

## 2020-06-30 RX ADMIN — NITROGLYCERIN 0.4 MG: 0.4 TABLET SUBLINGUAL at 19:48

## 2020-06-30 RX ADMIN — ACETAMINOPHEN 1000 MG: 500 TABLET ORAL at 19:48

## 2020-06-30 ASSESSMENT — ENCOUNTER SYMPTOMS
NAUSEA: 0
VOMITING: 0
CHEST TIGHTNESS: 1
ABDOMINAL DISTENTION: 0
WHEEZING: 0
PHOTOPHOBIA: 0
SHORTNESS OF BREATH: 1
TROUBLE SWALLOWING: 0
DIARRHEA: 0
RHINORRHEA: 0
EYES NEGATIVE: 1
GASTROINTESTINAL NEGATIVE: 1
ABDOMINAL PAIN: 0
SHORTNESS OF BREATH: 1
BACK PAIN: 0
CONSTIPATION: 0
COUGH: 1
SORE THROAT: 0
BACK PAIN: 1

## 2020-06-30 ASSESSMENT — PAIN DESCRIPTION - LOCATION: LOCATION: CHEST

## 2020-06-30 ASSESSMENT — PAIN DESCRIPTION - FREQUENCY: FREQUENCY: CONTINUOUS

## 2020-06-30 ASSESSMENT — PAIN SCALES - GENERAL
PAINLEVEL_OUTOF10: 7
PAINLEVEL_OUTOF10: 7

## 2020-06-30 ASSESSMENT — PAIN DESCRIPTION - ORIENTATION: ORIENTATION: ANTERIOR

## 2020-06-30 ASSESSMENT — PAIN DESCRIPTION - DESCRIPTORS: DESCRIPTORS: PRESSURE

## 2020-06-30 ASSESSMENT — PAIN DESCRIPTION - PAIN TYPE: TYPE: ACUTE PAIN

## 2020-06-30 NOTE — ED NOTES
Bed: 14  Expected date: 6/30/20  Expected time: 6:59 PM  Means of arrival:   Comments:  [id number], RN  06/30/20 [id number]

## 2020-06-30 NOTE — ED PROVIDER NOTES
[facility] [facility]  Emergency Department  Faculty Attestation       I performed a history and physical examination of the patient and discussed management with the resident. I reviewed the residents note and agree with the documented findings including all diagnostic interpretations and plan of care. Any areas of disagreement are noted on the chart. I was personally present for the key portions of any procedures. I have documented in the chart those procedures where I was not present during the key portions. I have reviewed the emergency nurses triage note. I agree with the chief complaint, past medical history, past surgical history, allergies, medications, social and family history as documented unless otherwise noted below. Documentation of the HPI, Physical Exam and Medical Decision Making performed by scribgus is based on my personal performance of the HPI, PE and MDM. For Physician Assistant/ Nurse Practitioner cases/documentation I have personally evaluated this patient and have completed at least one if not all key elements of the E/M (history, physical exam, and MDM). Additional findings are as noted. Pertinent Comments     Primary Care Physician: Cristo Boykin MD    ED Triage Vitals   BP Temp Temp Source Pulse Resp SpO2 Height Weight   06/30/20 1924 06/30/20 1932 06/30/20 1932 06/30/20 1924 06/30/20 1924 06/30/20 1924 -- 06/30/20 1932   (!) 131/98 99.6 °F (37.6 °C) Oral 65 21 94 %  250 lb (113.4 kg)        History/Physical: This is a 78 y.o. female who presents to the Emergency Department with complaint of chest pain that woke her up. Crushing sensation. Denies any viral-like illness, does have a history of a possible mass in her lungs that she does have a biopsy for outpatient. On exam she is obese female in no acute distress. NC/AT. Heart sounds regular and lungs CTAB on her home O2. Abdomoen soft and nontender. No leg edema. Alert and oriented with no focal deficits. MDM/Plan: Chest pain workup. Had a stress 3 months prior. Patient is borderline febrile, but no other viral complaints. CoVID is in consideration and will test if patient requires admission. Disposition pending results and symptomatic improvement. EKG Interpretation    Interpreted by emergency department physician    Rhythm: normal sinus  and sinus arrhythmia  Rate: normal  Axis: left  Ectopy: none  Conduction: normal  ST Segments: no acute change  T Waves: non specific changes  Q Waves: none    Clinical Impression:Nonspecific EKG with normal sinus rhythm and sinus arrhythmia. Left axis deviation and low voltage QRS with T wave flattening.   Unchanged EKG on 4/23/2020      Jacob Flores        CRITICAL CARE: None     Jacob Flores MD  Attending Emergency Physician        Jacob Flores MD  07/01/20 [id number]

## 2020-06-30 NOTE — ED PROVIDER NOTES
[facility] ED  Emergency Department Encounter  Emergency Medicine Resident     Pt Name: Pierre Basurto  MRN: [id number]  [place] 1950  Date of evaluation: 6/30/20  PCP:  Stan Jordan MD    CHIEF COMPLAINT       Chief Complaint   Patient presents with    Chest Pain       HISTORY OFPRESENT ILLNESS  (Location/Symptom, Timing/Onset, Context/Setting, Quality, Duration, Modifying [facility].)      Pierre Basurto is a 77 yo female with a past medic will history of coronary artery disease due to lipid rich plaque, craniectomy and attacks, type 2 diabetes, obesity, hypertension who presents with crushing substernal chest pain of approximately 35 minutes duration which woke her from sleep. Patient states that the pain started in the right anterior chest wall, rating to the left anterior chest wall and up to the jaw of her neck. Patient describes the pain as sharp, crushing in character. Patient stated that she had associated shortness of breath, rated the pain is 9 out of 10 intensity at its worst and states that the pain is partially relieved by nitroglycerin, denies aggravating factors. Prior to arrival EMS loaded patient on 324 aspirin. Patient has a multiple visits to the hospital but denies overt sick contacts, denies fever, chills, cough. Patient also endorses a dry cough of approximately 1 month duration, does have a history of tobacco use. Patient states the cough is nonproductive, describes it as dry and intermittent. Patient does have a follow-up this Thursday with her pulmonologist for a lung biopsy for suspicious mass of her left lobe. Patient states that she has a history of congestive heart failure but no overt heart attacks or coronary artery disease requiring stent placement. Patient does have a history of transient ischemic attacks for which he takes an 81 mg aspirin daily.      PAST MEDICAL / SURGICAL / SOCIAL / FAMILY HISTORY      has a past medical history of Allergic rhinitis, Anxiety, Arthritis, Asthma, Atrial fibrillation (HCC), Back pain, Benign hypertension with CKD (chronic kidney disease) stage III ([facility] [address] CAD (coronary artery disease), Caffeine use, CHF (congestive heart failure) ([address] Chronic kidney disease, CKD (chronic kidney disease) stage 3, GFR 30-59 ml/min ([facility]), COPD (chronic obstructive pulmonary disease) ([facility] [address] Degeneration of lumbar or lumbosacral intervertebral disc, Depression, DM (diabetes mellitus) ([facility] [address] Emphysema of lung ([facility] [address] Gastritis, GERD (gastroesophageal reflux disease), Hearing loss, Hematuria, Hiatal hernia, HTN (hypertension), benign, Hypertriglyceridemia, Incontinence of urine, Knee arthropathy, Lumbosacral spondylosis without myelopathy, Migraine headache, Mumps, Neuropathy, Obesity, On home oxygen therapy, HIGINIO on CPAP, Otitis media, Secondary diabetes mellitus with stage 3 chronic kidney disease (GFR 30-59) ([facility] [address] Stroke ([facility] [address] Tinnitus, Type 2 diabetes mellitus without complication ([facility] [address] Type II or unspecified type diabetes mellitus without mention of complication, not stated as uncontrolled, UTI (lower urinary tract infection), and Varicose vein. has a past surgical history that includes Cholecystectomy (2007); Carpal tunnel release (Right); Tympanoplasty; Dilation & curettage (1979); Cystocopy (9/25/2013); Cataract removal with implant (Bilateral); Tonsillectomy; Incontinence surgery; ablation of dysrhythmic focus (2000); Upper gastrointestinal endoscopy (03/2016); eye surgery; Tubal ligation; other surgical history (09/10/15); Insertable Cardiac Monitor (12/04/2015); Tympanoplasty; Colonoscopy; Colonoscopy (04/10/2017); pr colsc flx w/removal lesion by hot bx forceps (N/A, 4/10/2017); and Tympanostomy tube placement (08/21/2017).      Social History     Socioeconomic History    Marital status: Legally      Spouse name: Not on file    Number of children: Not on file    Years of education: Not on file    Highest education level: Not on file   Occupational History    Occupation: disabled     Employer: N/A   Social Needs    Financial resource strain: Not on file    Food insecurity     Worry: Not on file     Inability: Not on file    Transportation needs     Medical: Not on file     Non-medical: Not on file   Tobacco Use    Smoking status: Former Smoker     Packs/day: 3.00     Years: 41.00     Pack years: 123.00     Types: Cigarettes     Last attempt to quit: 2000     Years since quittin.5    Smokeless tobacco: Never Used    Tobacco comment: quit in    Substance and Sexual Activity    Alcohol use: No     Alcohol/week: 0.0 standard drinks    Drug use: No    Sexual activity: Not Currently   Lifestyle    Physical activity     Days per week: Not on file     Minutes per session: Not on file    Stress: Not on file   Relationships    Social connections     Talks on phone: Not on file     Gets together: Not on file     Attends Hinduism service: Not on file     Active member of club or organization: Not on file     Attends meetings of clubs or organizations: Not on file     Relationship status: Not on file    Intimate partner violence     Fear of current or ex partner: Not on file     Emotionally abused: Not on file     Physically abused: Not on file     Forced sexual activity: Not on file   Other Topics Concern    Not on file   Social History Narrative    Not on file       Family History   Problem Relation Age of Onset    Diabetes Mother     Heart Disease Mother     Stomach Cancer Father     Diabetes Maternal Grandmother         also aunts and uncles (maternal)    Emphysema Sister         Allergies:  Robitussin [guaifenesin]; Codeine; Compazine [prochlorperazine maleate]; Iodides; Morphine; Moxifloxacin; Reglan [metoclopramide]; Avelox [moxifloxacin hcl in nacl];  Cipro xr; and Sulfa antibiotics    Home Medications:  Prior to Admission medications    Medication Sig Start Date End Date Taking? Authorizing Provider   oxyCODONE-acetaminophen (PERCOCET) 5-325 MG per tablet Take 1 tablet by mouth See Admin Instructions for 30 days. Intended supply: 30 days. 1 tab 3-4 times a day prn 7/3/20 8/2/20  VERONIKA Randall CNP   nystatin (MYCOSTATIN) 301013 UNIT/GM powder Apply 3 times daily. 6/25/20   Melina Brian MD   dicyclomine (BENTYL) 10 MG capsule Take 1 capsule by mouth 2 times daily as needed (abdominal spasm) 6/25/20   Melina Brian MD   clopidogrel (PLAVIX) 75 MG tablet TAKE 1 TAB BY MOUTH ONCE A DAY ( AM ) -THIS MEDICINE MAY BE TAKENWITH OR WITHOUT FOOD 3/27/20   Melina Brian MD   gabapentin (NEURONTIN) 300 MG capsule Take 1 capsule by mouth 3 times daily for 30 days. 3/2/20 6/30/20  VERONIKA Anton CNP   topiramate (TOPAMAX) 100 MG tablet Take 1 tablet by mouth nightly 3/2/20   VERONIKA Anton CNP   tobramycin-dexamethasone (TOBRADEX) 0.3-0.1 % ophthalmic suspension  1/30/20   Historical Provider, MD   insulin aspart (NOVOLOG FLEXPEN) 100 UNIT/ML injection pen If <139 - No Insulin; 140-199-2 Units;200-249-4 Units;250-299-6 Units;300-349-8 Units;350-400=10 Units; Above 400-12 Units 2/12/20   Melina Brian MD   insulin glargine (LANTUS SOLOSTAR) 100 UNIT/ML injection pen Inject 42 Units into the skin 2 times daily 2/12/20   Melina Brian MD   aspirin 81 MG chewable tablet Take 1 tablet by mouth daily 12/3/19   Melina Brian MD   atorvastatin (LIPITOR) 80 MG tablet Take 1 tablet by mouth daily 12/3/19   Melina Brian MD   lidocaine (LMX) 4 % cream Apply topically every 8 hrs as needed for pain 11/11/19   Melina Brian MD   naloxone 4 MG/0.1ML LIQD nasal spray 1 spray by Nasal route as needed for Opioid Reversal 10/10/19   Yeyo Kirkland MD   [address] by Does not apply route 9/24/19   Melina Brian MD   Misc.  Devices (ADJUST BATH/SHOWER SEAT/BACK) MISC Use daily for shower 9/24/19   Melina Brian MD   ferrous sulfate 325 (65 Fe) MG tablet TAKE 1 SYMBICORT 160-4.5 MCG/ACT AERO   2 puffs As needed for sob 5/28/15   Historical Provider, MD   OXYGEN Inhale 3 L into the lungs     Historical Provider, MD       REVIEW OFSYSTEMS    (2-9 systems for level 4, 10 or more for level 5)      Review of Systems   Constitutional: Negative for chills, fatigue and fever. HENT: Negative for hearing loss, rhinorrhea, sneezing, sore throat, tinnitus and trouble swallowing. Eyes: Negative for photophobia and visual disturbance. Respiratory: Positive for cough, chest tightness and shortness of breath. Negative for wheezing. Cardiovascular: Positive for chest pain. Negative for palpitations. Gastrointestinal: Negative for abdominal distention, abdominal pain, constipation, diarrhea, nausea and vomiting. Endocrine: Negative for polyuria. Genitourinary: Negative for dysuria, flank pain, frequency, vaginal bleeding and vaginal discharge. Musculoskeletal: Negative for arthralgias, back pain, gait problem and neck pain. Neurological: Negative for dizziness, tremors, seizures, syncope, facial asymmetry, numbness and headaches. Psychiatric/Behavioral: Negative for self-injury and suicidal ideas. PHYSICAL EXAM   (up to 7 for level 4, 8 or more forlevel 5)      INITIAL VITALS:   ED Triage Vitals   BP Temp Temp Source Pulse Resp SpO2 Height Weight   06/30/20 1924 06/30/20 1932 06/30/20 1932 06/30/20 1924 06/30/20 1924 06/30/20 1924 -- 06/30/20 1932   (!) 131/98 99.6 °F (37.6 °C) Oral 65 21 94 %  250 lb (113.4 kg)       Physical Exam  Constitutional:       General: She is not in acute distress. Appearance: She is obese. She is not toxic-appearing or diaphoretic. HENT:      Head: Normocephalic and atraumatic. Eyes:      Extraocular Movements: Extraocular movements intact. Neck:      Musculoskeletal: No neck rigidity or muscular tenderness. Cardiovascular:      Rate and Rhythm: Normal rate and regular rhythm. Pulses: Normal pulses.    Pulmonary: Effort: No respiratory distress. Breath sounds: Normal breath sounds. No wheezing. Abdominal:      General: There is no distension. Palpations: Abdomen is soft. Tenderness: There is no abdominal tenderness. Musculoskeletal: Normal range of motion. Right lower leg: No edema. Left lower leg: No edema. Skin:     General: Skin is dry. Neurological:      General: No focal deficit present. Mental Status: She is oriented to person, place, and time. Psychiatric:         Mood and Affect: Mood normal.         Behavior: Behavior normal.         Thought Content: Thought content normal.         Judgment: Judgment normal.         DIFFERENTIAL  DIAGNOSIS     PLAN (LABS / IMAGING / EKG):  Orders Placed This Encounter   Procedures    XR CHEST STANDARD (2 VW)    Troponin    Brain Natriuretic Peptide    TSH with Reflex    CBC WITH AUTO DIFFERENTIAL    Comprehensive Metabolic Panel    LIPASE    D-Dimer, Quantitative    Troponin    C-Reactive Protein    Lactate Dehydrogenase    COVID-19    Inpatient consult to Hospitalist    PATIENT STATUS (FROM ED OR OR/PROCEDURAL) Inpatient       MEDICATIONS ORDERED:  Orders Placed This Encounter   Medications    nitroGLYCERIN (NITROSTAT) SL tablet 0.4 mg    acetaminophen (TYLENOL) tablet 1,000 mg    meclizine (ANTIVERT) tablet 25 mg       DDX: Angina, unstable angina, anxiety, myocardial infarction, and STEMI, bronchitis, pleuritis, pancreatitis, transaminitis, choledocholithiasis, cholecystitis, COVID, pulmonary embolism    Initial MDM/Plan: 78 y.o. female who presents with acute onset shortness of breath and crushing substernal chest pain occurring at rest.  Patient denies diaphoresis, denies nausea and vomiting, has had a mild cough recently, states that the chest pain radiates from the right anterior chest wall the left anterior chest wall and neck.   Patient has denied a history of fevers or subjective chills, however does have a temperature of 99.6 meeting standards for concerns for COVID. Plan to do a cardiac work-up to assess for N STEMI with serial troponins as well as get an EKG to assess for ST elevation myocardial infarction, plan to get a d-dimer as patient states that she has had subjective shortness of breath is sedentary at baseline and has recent concerns for malignancy for which she is getting a biopsy at this Thursday of her lung. Patient does have a history of hypertension, tobacco use. Plan to treat patient's pain with nitroglycerin, Tylenol, reassess patient including temperature when she has had more time outside of the warm air. DIAGNOSTIC RESULTS / EMERGENCYDEPARTMENT COURSE / MDM     LABS:  Labs Reviewed   COMPREHENSIVE METABOLIC PANEL - Abnormal; Notable for the following components:       Result Value    Glucose 69 (*)     CREATININE 1.06 (*)     Total Bilirubin 0.16 (*)     GFR Non- 51 (*)     All other components within normal limits   TROPONIN   BRAIN NATRIURETIC PEPTIDE   TSH WITH REFLEX   CBC WITH AUTO DIFFERENTIAL   LIPASE   D-DIMER, QUANTITATIVE   TROPONIN   C-REACTIVE PROTEIN   LACTATE DEHYDROGENASE   COVID-19         RADIOLOGY:  Xr Chest Standard (2 Vw)    Result Date: 6/30/2020  EXAMINATION: TWO XRAY VIEWS OF THE CHEST 6/30/2020 8:02 pm COMPARISON: 02/02/2020 HISTORY: ORDERING SYSTEM PROVIDED HISTORY: substernal chest pain TECHNOLOGIST PROVIDED HISTORY: substernal chest pain Reason for Exam: cp Acuity: Unknown Type of Exam: Unknown 34-year-old female with substernal chest pain FINDINGS: Loop recorder device projects over the left chest wall. Cardiac monitor leads overlie the chest. Cardiac and mediastinal contours unchanged. Stable mild cardiomegaly. Trachea midline. No pneumothorax. No free air. No acute focal airspace consolidation or pleural effusions. Mild diffuse degenerative changes throughout the spine. Prior cholecystectomy. No acute focal airspace consolidation.   Stable mild cardiomegaly. EKG  Normal sinus, left axis deviation, low voltage, poor R wave progression potentially secondary to habitus or lead placement, no ST elevation or depressions, nonspecific EKG    All EKG's are interpreted by the Emergency Department Physicianwho either signs or Co-signs this chart in the absence of a cardiologist.    EMERGENCY DEPARTMENT COURSE:  ED Course as of Jul 01 0331 Tue Jun 30, 2020 2041 Patient's d-dimer noted to be 0.62, age-adjusted for patient age of 69Low risk for pulmonary embolism, will not be getting a CT pulmonary embolism at this given time. Patient has not been tachycardic, pain has been improved with Tylenol   D-Dimer, Quant: 0.62 [GP]      ED Course User Index  [GP] Michelle Trinidad MD   Patient states that since arrival to the emergency department she has had acute onset of dizziness made worse with ambulation when attempting to urinate. Patient denies any other subjective symptoms, states that she has some concerns of being able to ambulate at home. I personally walked the patient with her having acute onset dizziness with positional changes requiring assistance to ambulate. Secondary to this and multiple risk factors with an uncertain cause of dizziness, as well as a heart score of 6 secondary to a moderately suspicious history, nonspecific repolarization disturbance, and age of greater than 65 years, and greater than 3 risk factors patient is to be admitted to [facility] secondary to persistent dizziness as well as a heart score of 5. Patient currently awaiting bed placement, patient CARE handed off to Dr. Pearl Nurse:  None    CONSULTS:  IP CONSULT TO HOSPITALIST    CRITICAL CARE:  Please see attending note    FINAL IMPRESSION      1. Chest pain, unspecified type    2.  Dizziness          DISPOSITION / PLAN     DISPOSITION        PATIENT REFERRED TO:  Sadnra Stallings MD  [address] [place] [address] [place] [id number]  [phone]            DISCHARGE MEDICATIONS:  New Prescriptions    No medications on file       Bella Farley MD  Emergency Medicine Resident    (Please note that portions of this note were completed with a voice recognition program.Efforts were made to edit the dictations but occasionally words are mis-transcribed.)       Bella Farley MD  Resident  07/01/20 [id number]

## 2020-06-30 NOTE — PROGRESS NOTES
[facility] [address] PROGRESS NOTE      Patient phone call to   review Medication Agreement    Chief Complaint: low back pain    HPI: She c/o low back pain radiating down legs. , Pain has increased, No history of lumbar surgery, PT did not help her. She does home exercises. She uses oxygen at night, she sleeps well. She has sleep apnea, She states is having some testing done, states spot on lung. No Ed visits. Back Pain   This is a chronic problem. The problem occurs constantly. The problem has been gradually worsening since onset. The pain is present in the lumbar spine. Quality: sharp. Radiates to: down legs. The pain is at a severity of 8/10. The pain is worse during the night (morning). Associated symptoms include headaches. Risk factors include menopause, obesity and sedentary lifestyle. She has tried analgesics and heat for the symptoms. The treatment provided mild relief. Patient denies any new neurological symptoms. No bowel or bladder incontinence, no weakness, and no falling. Any new diagnostic workup: [x] no  [] yes [] Xray [] CT scan [] MRI [] DEXA scan     [] Other                    Treatment goals:  Functional status: make it through eeach day      Aberrancy:   Any alcoholic beverages no      Any illegal drugs   no      Analgesia:8      Adverse  Effects :none    ADL;s :home exercises        Pill count: appropriate    Morphine equivalent dose as reported on [place]:  Periodic Controlled Substance Monitoring: Possible medication side effects, risk of tolerance/dependence & alternative treatments discussed., No signs of potential drug abuse or diversion identified. , Assessed functional status., Obtaining appropriate analgesic effect of treatment. Volodymyr Mckeon, APRN - CNP)  Review of[place]RS does not show any aberrant prescription behavior. Medication is helping the patient stay active. Patient denies any side effects and reports adequate analgesia.  No sign of misuse/abuse. When was thelast UDS: 1-6-2020            Was the UDS appropriate:yes      Record/Diagnostics Review:      As above, I did review the imaging  1/9/2020  8:36 PM - Carmelo, Justino Incoming Lab Results From [facility]     Component Value Ref Range & Units Status Collected Lab   Pain Management Drug Panel Interp, Urine Consistent   Final 01/06/2020 11:40 AM [facility]   (NOTE)   ________________________________________________________________   DRUGS EXPECTED:   PERCOCET (OXYCODONE) [1/6/20]   ________________________________________________________________   CONSISTENT with medications provided:   PERCOCET (OXYCODONE) : based on oxycodone, noroxycodone   ________________________________________________________________   Drugs Not Included in this Assay:   Acetaminophen   ________________________________________________________________   INTERPRETIVE INFORMATION: Pain Mgt [place], [place] Spec/EMIT, Ur,                            Interp   Interpretation depends on accuracy and completeness of patient   medication information submitted by client. 6-Acetylmorphine, Ur Not Detected                  Past Medical History:   Diagnosis Date    Allergic rhinitis     Anxiety 7/17/2013    Arthritis     Asthma     Atrial fibrillation (HCC)     Back pain     NERVE/DR. GRACIA    Benign hypertension with CKD (chronic kidney disease) stage III (HCC)     CAD (coronary artery disease) 3/21/2013    Caffeine use     2 coffee/day    CHF (congestive heart failure) (HCC)     Chronic kidney disease     CKD (chronic kidney disease) stage 3, GFR 30-59 ml/min (HCC)     COPD (chronic obstructive pulmonary disease) (HCC)     emphysema    Degeneration of lumbar or lumbosacral intervertebral disc     Depression     DM (diabetes mellitus) (HCC)     Emphysema of lung (HCC)     Gastritis     GERD (gastroesophageal reflux disease)     Hearing loss     Hematuria     Hiatal hernia     HTN (hypertension), benign 6/28/2014    reaction(s): Intolerance-unknown  Other reaction(s): Intolerance-unknown    Reglan [Metoclopramide] Nausea Only    Avelox [Moxifloxacin Hcl In Nacl] Rash     Dermatitis      Cipro Xr Rash     dermatitis    Sulfa Antibiotics Rash         Current Outpatient Medications:     nystatin (MYCOSTATIN) 569802 UNIT/GM powder, Apply 3 times daily. , Disp: 3 Bottle, Rfl: 3    oxyCODONE-acetaminophen (PERCOCET) 5-325 MG per tablet, Take 1 tablet by mouth See Admin Instructions for 30 days. Intended supply: 30 days. 1 tab 3-4 times a day prn, Disp: 100 tablet, Rfl: 0    gabapentin (NEURONTIN) 300 MG capsule, Take 1 capsule by mouth 3 times daily for 30 days. , Disp: 90 capsule, Rfl: 0    topiramate (TOPAMAX) 100 MG tablet, Take 1 tablet by mouth nightly, Disp: 90 tablet, Rfl: 1    tobramycin-dexamethasone (TOBRADEX) 0.3-0.1 % ophthalmic suspension, , Disp: , Rfl:     insulin glargine (LANTUS SOLOSTAR) 100 UNIT/ML injection pen, Inject 42 Units into the skin 2 times daily, Disp: 5 pen, Rfl: 3    aspirin 81 MG chewable tablet, Take 1 tablet by mouth daily, Disp: 90 tablet, Rfl: 3    atorvastatin (LIPITOR) 80 MG tablet, Take 1 tablet by mouth daily, Disp: 90 tablet, Rfl: 3    lidocaine (LMX) 4 % cream, Apply topically every 8 hrs as needed for pain, Disp: 45 g, Rfl: 3    ferrous sulfate 325 (65 Fe) MG tablet, TAKE 1 TAB BY MOUTH EVERY MORNING, Disp: 90 tablet, Rfl: 5    magnesium oxide (MAG-OX) 400 MG tablet, TAKE 1 TAB BY MOUTH TWICE A DAY ( AM AND BEDTIME ), Disp: 180 tablet, Rfl: 3    carvedilol (COREG) 3.125 MG tablet, TAKE 1 TAB BY MOUTH TWICE A DAY ( AM AND BEDTIME ), Disp: 180 tablet, Rfl: 3    metFORMIN (GLUCOPHAGE) 1000 MG tablet, TAKE 1 TAB BY MOUTH TWICE A DAY ( AM AND BEDTIME ), Disp: 180 tablet, Rfl: 3    citalopram (CELEXA) 40 MG tablet, TAKE 1/2  TAB BY MOUTH TWICE  A DAY, Disp: 90 tablet, Rfl: 3    pantoprazole (PROTONIX) 40 MG tablet, Take 1 tablet by mouth 2 times daily, Disp: 60 tablet, Rfl: 3   Forced sexual activity: Not on file   Other Topics Concern    Not on file   Social History Narrative    Not on file         Review of Systems:  Review of Systems   HENT: Negative. Eyes: Negative. Cardiovascular: Negative. Respiratory: Positive for shortness of breath. Uses oxygen   Endocrine:        Blood sugar 81 this am   Hematologic/Lymphatic: Bruises/bleeds easily. Skin: Negative. Musculoskeletal: Positive for back pain and joint pain. Gastrointestinal: Negative. Genitourinary: Positive for nocturia. Neurological: Positive for headaches. Psychiatric/Behavioral: Negative. Physical Exam:  LMP  (LMP Unknown)     Physical Exam  Skin:         Neurological:      Mental Status: She is alert. Psychiatric:         Mood and Affect: Mood normal.         Thought Content: Thought content normal.           Assessment:  Problem List Items Addressed This Visit     Osteoarthritis of cervical spine - Primary (Chronic)    Lumbosacral spondylosis without myelopathy (Chronic)    Lumbar radiculopathy, chronic    DDD (degenerative disc disease), lumbar    DDD (degenerative disc disease), cervical (Chronic)    Chronic, continuous use of opioids    Chronic pain of left knee    Bilateral low back pain with sciatica                Treatment Plan:  DISCUSSION: Treatment options discussed withpatient and all questions answered to patient's satisfaction. Possible side effects, risk of tolerance and or dependence and alternative treatments discussed    Obtaining appropriate analgesic effect of treatment   No signs of potential drug abuse or diversion identified    [x] Ill effects of being on chronic pain medications such as sleep disturbances, respiratory depression, hormonal changes, withdrawal symptoms, chronic opioid dependence and tolerance as well as risk of taking opioids with Benzodiazepines and taking opioids along with alcohol,  werediscussed with patient.  I had asked the patient to

## 2020-07-01 PROBLEM — R07.9 CHEST PAIN AT REST: Status: ACTIVE | Noted: 2020-07-01

## 2020-07-01 PROBLEM — R06.02 SHORTNESS OF BREATH: Status: ACTIVE | Noted: 2020-07-01

## 2020-07-01 LAB
EKG ATRIAL RATE: 69 BPM
EKG P AXIS: 45 DEGREES
EKG P-R INTERVAL: 134 MS
EKG Q-T INTERVAL: 408 MS
EKG QRS DURATION: 64 MS
EKG QTC CALCULATION (BAZETT): 437 MS
EKG R AXIS: -36 DEGREES
EKG T AXIS: 75 DEGREES
EKG VENTRICULAR RATE: 69 BPM
GLUCOSE BLD-MCNC: 152 MG/DL (ref 65–105)
GLUCOSE BLD-MCNC: 202 MG/DL (ref 65–105)
GLUCOSE BLD-MCNC: 210 MG/DL (ref 65–105)
GLUCOSE BLD-MCNC: 268 MG/DL (ref 65–105)
GLUCOSE BLD-MCNC: 52 MG/DL (ref 65–105)
GLUCOSE BLD-MCNC: 94 MG/DL (ref 65–105)
SARS-COV-2, PCR: NORMAL
SARS-COV-2, RAPID: NORMAL
SARS-COV-2: NOT DETECTED
SOURCE: NORMAL
TROPONIN INTERP: NORMAL
TROPONIN INTERP: NORMAL
TROPONIN T: NORMAL NG/ML
TROPONIN T: NORMAL NG/ML
TROPONIN, HIGH SENSITIVITY: 8 NG/L (ref 0–14)
TROPONIN, HIGH SENSITIVITY: 8 NG/L (ref 0–14)

## 2020-07-01 PROCEDURE — 93005 ELECTROCARDIOGRAM TRACING: CPT | Performed by: FAMILY MEDICINE

## 2020-07-01 PROCEDURE — 94761 N-INVAS EAR/PLS OXIMETRY MLT: CPT

## 2020-07-01 PROCEDURE — G0378 HOSPITAL OBSERVATION PER HR: HCPCS

## 2020-07-01 PROCEDURE — 6370000000 HC RX 637 (ALT 250 FOR IP): Performed by: NURSE PRACTITIONER

## 2020-07-01 PROCEDURE — 96372 THER/PROPH/DIAG INJ SC/IM: CPT

## 2020-07-01 PROCEDURE — 94640 AIRWAY INHALATION TREATMENT: CPT

## 2020-07-01 PROCEDURE — 99219 PR INITIAL OBSERVATION CARE/DAY 50 MINUTES: CPT | Performed by: FAMILY MEDICINE

## 2020-07-01 PROCEDURE — 6360000002 HC RX W HCPCS: Performed by: NURSE PRACTITIONER

## 2020-07-01 PROCEDURE — 93010 ELECTROCARDIOGRAM REPORT: CPT | Performed by: INTERNAL MEDICINE

## 2020-07-01 PROCEDURE — 6370000000 HC RX 637 (ALT 250 FOR IP): Performed by: FAMILY MEDICINE

## 2020-07-01 PROCEDURE — 2580000003 HC RX 258: Performed by: NURSE PRACTITIONER

## 2020-07-01 PROCEDURE — 2700000000 HC OXYGEN THERAPY PER DAY

## 2020-07-01 PROCEDURE — 6370000000 HC RX 637 (ALT 250 FOR IP): Performed by: STUDENT IN AN ORGANIZED HEALTH CARE EDUCATION/TRAINING PROGRAM

## 2020-07-01 PROCEDURE — U0003 INFECTIOUS AGENT DETECTION BY NUCLEIC ACID (DNA OR RNA); SEVERE ACUTE RESPIRATORY SYNDROME CORONAVIRUS 2 (SARS-COV-2) (CORONAVIRUS DISEASE [COVID-19]), AMPLIFIED PROBE TECHNIQUE, MAKING USE OF HIGH THROUGHPUT TECHNOLOGIES AS DESCRIBED BY CMS-2020-01-R: HCPCS

## 2020-07-01 PROCEDURE — 84484 ASSAY OF TROPONIN QUANT: CPT

## 2020-07-01 PROCEDURE — 82947 ASSAY GLUCOSE BLOOD QUANT: CPT

## 2020-07-01 RX ORDER — MECLIZINE HCL 12.5 MG/1
12.5 TABLET ORAL 3 TIMES DAILY PRN
Status: DISCONTINUED | OUTPATIENT
Start: 2020-07-01 | End: 2020-07-02 | Stop reason: HOSPADM

## 2020-07-01 RX ORDER — DEXTROSE MONOHYDRATE 50 MG/ML
100 INJECTION, SOLUTION INTRAVENOUS PRN
Status: DISCONTINUED | OUTPATIENT
Start: 2020-07-01 | End: 2020-07-02 | Stop reason: HOSPADM

## 2020-07-01 RX ORDER — FUROSEMIDE 20 MG/1
20 TABLET ORAL DAILY PRN
Status: DISCONTINUED | OUTPATIENT
Start: 2020-07-01 | End: 2020-07-02 | Stop reason: HOSPADM

## 2020-07-01 RX ORDER — NICOTINE POLACRILEX 4 MG
15 LOZENGE BUCCAL PRN
Status: DISCONTINUED | OUTPATIENT
Start: 2020-07-01 | End: 2020-07-02 | Stop reason: HOSPADM

## 2020-07-01 RX ORDER — POTASSIUM CHLORIDE 20 MEQ/1
40 TABLET, EXTENDED RELEASE ORAL PRN
Status: DISCONTINUED | OUTPATIENT
Start: 2020-07-01 | End: 2020-07-02 | Stop reason: HOSPADM

## 2020-07-01 RX ORDER — POTASSIUM CHLORIDE 7.45 MG/ML
10 INJECTION INTRAVENOUS PRN
Status: DISCONTINUED | OUTPATIENT
Start: 2020-07-01 | End: 2020-07-02 | Stop reason: HOSPADM

## 2020-07-01 RX ORDER — PROMETHAZINE HYDROCHLORIDE 25 MG/1
12.5 TABLET ORAL EVERY 6 HOURS PRN
Status: DISCONTINUED | OUTPATIENT
Start: 2020-07-01 | End: 2020-07-02 | Stop reason: HOSPADM

## 2020-07-01 RX ORDER — ACETAMINOPHEN 650 MG/1
650 SUPPOSITORY RECTAL EVERY 6 HOURS PRN
Status: DISCONTINUED | OUTPATIENT
Start: 2020-07-01 | End: 2020-07-02 | Stop reason: HOSPADM

## 2020-07-01 RX ORDER — ONDANSETRON 2 MG/ML
4 INJECTION INTRAMUSCULAR; INTRAVENOUS EVERY 6 HOURS PRN
Status: DISCONTINUED | OUTPATIENT
Start: 2020-07-01 | End: 2020-07-02 | Stop reason: HOSPADM

## 2020-07-01 RX ORDER — PANTOPRAZOLE SODIUM 40 MG/1
40 TABLET, DELAYED RELEASE ORAL 2 TIMES DAILY
Status: DISCONTINUED | OUTPATIENT
Start: 2020-07-01 | End: 2020-07-02 | Stop reason: HOSPADM

## 2020-07-01 RX ORDER — INSULIN GLARGINE 100 [IU]/ML
25 INJECTION, SOLUTION SUBCUTANEOUS 2 TIMES DAILY
Status: DISCONTINUED | OUTPATIENT
Start: 2020-07-01 | End: 2020-07-02 | Stop reason: HOSPADM

## 2020-07-01 RX ORDER — CLOPIDOGREL BISULFATE 75 MG/1
75 TABLET ORAL DAILY
Status: DISCONTINUED | OUTPATIENT
Start: 2020-07-01 | End: 2020-07-02 | Stop reason: HOSPADM

## 2020-07-01 RX ORDER — BUDESONIDE AND FORMOTEROL FUMARATE DIHYDRATE 160; 4.5 UG/1; UG/1
2 AEROSOL RESPIRATORY (INHALATION) DAILY
Status: DISCONTINUED | OUTPATIENT
Start: 2020-07-01 | End: 2020-07-02 | Stop reason: HOSPADM

## 2020-07-01 RX ORDER — SODIUM CHLORIDE 0.9 % (FLUSH) 0.9 %
10 SYRINGE (ML) INJECTION EVERY 12 HOURS SCHEDULED
Status: DISCONTINUED | OUTPATIENT
Start: 2020-07-01 | End: 2020-07-02 | Stop reason: HOSPADM

## 2020-07-01 RX ORDER — MECLIZINE HCL 12.5 MG/1
25 TABLET ORAL ONCE
Status: COMPLETED | OUTPATIENT
Start: 2020-07-01 | End: 2020-07-01

## 2020-07-01 RX ORDER — NITROGLYCERIN 0.4 MG/1
0.4 TABLET SUBLINGUAL EVERY 5 MIN PRN
Status: DISCONTINUED | OUTPATIENT
Start: 2020-07-01 | End: 2020-07-02 | Stop reason: HOSPADM

## 2020-07-01 RX ORDER — CARVEDILOL 3.12 MG/1
3.12 TABLET ORAL 2 TIMES DAILY WITH MEALS
Status: DISCONTINUED | OUTPATIENT
Start: 2020-07-01 | End: 2020-07-02 | Stop reason: HOSPADM

## 2020-07-01 RX ORDER — TOPIRAMATE 100 MG/1
100 TABLET, FILM COATED ORAL NIGHTLY
Status: DISCONTINUED | OUTPATIENT
Start: 2020-07-01 | End: 2020-07-02 | Stop reason: HOSPADM

## 2020-07-01 RX ORDER — DICYCLOMINE HYDROCHLORIDE 10 MG/1
10 CAPSULE ORAL 2 TIMES DAILY PRN
Status: DISCONTINUED | OUTPATIENT
Start: 2020-07-01 | End: 2020-07-02 | Stop reason: HOSPADM

## 2020-07-01 RX ORDER — LANOLIN ALCOHOL/MO/W.PET/CERES
325 CREAM (GRAM) TOPICAL
Status: DISCONTINUED | OUTPATIENT
Start: 2020-07-01 | End: 2020-07-02 | Stop reason: HOSPADM

## 2020-07-01 RX ORDER — INSULIN GLARGINE 100 [IU]/ML
42 INJECTION, SOLUTION SUBCUTANEOUS 2 TIMES DAILY
Status: DISCONTINUED | OUTPATIENT
Start: 2020-07-01 | End: 2020-07-01

## 2020-07-01 RX ORDER — DOCUSATE SODIUM 100 MG/1
100 CAPSULE, LIQUID FILLED ORAL 2 TIMES DAILY
Status: DISCONTINUED | OUTPATIENT
Start: 2020-07-01 | End: 2020-07-02 | Stop reason: HOSPADM

## 2020-07-01 RX ORDER — DEXTROSE MONOHYDRATE 25 G/50ML
12.5 INJECTION, SOLUTION INTRAVENOUS PRN
Status: DISCONTINUED | OUTPATIENT
Start: 2020-07-01 | End: 2020-07-02 | Stop reason: HOSPADM

## 2020-07-01 RX ORDER — MAGNESIUM SULFATE 1 G/100ML
1 INJECTION INTRAVENOUS PRN
Status: DISCONTINUED | OUTPATIENT
Start: 2020-07-01 | End: 2020-07-02 | Stop reason: HOSPADM

## 2020-07-01 RX ORDER — GABAPENTIN 300 MG/1
300 CAPSULE ORAL 3 TIMES DAILY
Status: DISCONTINUED | OUTPATIENT
Start: 2020-07-01 | End: 2020-07-02 | Stop reason: HOSPADM

## 2020-07-01 RX ORDER — LOSARTAN POTASSIUM 25 MG/1
25 TABLET ORAL DAILY
Status: DISCONTINUED | OUTPATIENT
Start: 2020-07-01 | End: 2020-07-02 | Stop reason: HOSPADM

## 2020-07-01 RX ORDER — CITALOPRAM 20 MG/1
20 TABLET ORAL 2 TIMES DAILY
Status: DISCONTINUED | OUTPATIENT
Start: 2020-07-01 | End: 2020-07-02 | Stop reason: HOSPADM

## 2020-07-01 RX ORDER — SODIUM CHLORIDE 0.9 % (FLUSH) 0.9 %
10 SYRINGE (ML) INJECTION PRN
Status: DISCONTINUED | OUTPATIENT
Start: 2020-07-01 | End: 2020-07-02 | Stop reason: HOSPADM

## 2020-07-01 RX ORDER — ACETAMINOPHEN 325 MG/1
650 TABLET ORAL EVERY 6 HOURS PRN
Status: DISCONTINUED | OUTPATIENT
Start: 2020-07-01 | End: 2020-07-02 | Stop reason: HOSPADM

## 2020-07-01 RX ORDER — UREA 10 %
3 LOTION (ML) TOPICAL NIGHTLY PRN
Status: DISCONTINUED | OUTPATIENT
Start: 2020-07-01 | End: 2020-07-02 | Stop reason: HOSPADM

## 2020-07-01 RX ORDER — ATORVASTATIN CALCIUM 80 MG/1
80 TABLET, FILM COATED ORAL DAILY
Status: DISCONTINUED | OUTPATIENT
Start: 2020-07-01 | End: 2020-07-02 | Stop reason: HOSPADM

## 2020-07-01 RX ADMIN — ASPIRIN 325 MG: 325 TABLET, COATED ORAL at 09:51

## 2020-07-01 RX ADMIN — FERROUS SULFATE TAB EC 325 MG (65 MG FE EQUIVALENT) 325 MG: 325 (65 FE) TABLET DELAYED RESPONSE at 09:52

## 2020-07-01 RX ADMIN — GABAPENTIN 300 MG: 300 CAPSULE ORAL at 23:13

## 2020-07-01 RX ADMIN — ATORVASTATIN CALCIUM 80 MG: 80 TABLET, FILM COATED ORAL at 09:52

## 2020-07-01 RX ADMIN — ENOXAPARIN SODIUM 30 MG: 30 INJECTION SUBCUTANEOUS at 23:13

## 2020-07-01 RX ADMIN — MECLIZINE HYDROCHLORIDE 25 MG: 12.5 TABLET, FILM COATED ORAL at 00:20

## 2020-07-01 RX ADMIN — GABAPENTIN 300 MG: 300 CAPSULE ORAL at 09:52

## 2020-07-01 RX ADMIN — CARVEDILOL 3.12 MG: 3.12 TABLET, FILM COATED ORAL at 09:51

## 2020-07-01 RX ADMIN — CLOPIDOGREL 75 MG: 75 TABLET, FILM COATED ORAL at 09:52

## 2020-07-01 RX ADMIN — CARVEDILOL 3.12 MG: 3.12 TABLET, FILM COATED ORAL at 17:05

## 2020-07-01 RX ADMIN — LOSARTAN POTASSIUM 25 MG: 25 TABLET, FILM COATED ORAL at 09:52

## 2020-07-01 RX ADMIN — INSULIN LISPRO 4 UNITS: 100 INJECTION, SOLUTION INTRAVENOUS; SUBCUTANEOUS at 12:26

## 2020-07-01 RX ADMIN — INSULIN LISPRO 2 UNITS: 100 INJECTION, SOLUTION INTRAVENOUS; SUBCUTANEOUS at 09:41

## 2020-07-01 RX ADMIN — MAGNESIUM GLUCONATE 500 MG ORAL TABLET 400 MG: 500 TABLET ORAL at 09:52

## 2020-07-01 RX ADMIN — DOCUSATE SODIUM 100 MG: 100 CAPSULE, LIQUID FILLED ORAL at 23:12

## 2020-07-01 RX ADMIN — ACETAMINOPHEN 650 MG: 325 TABLET ORAL at 23:17

## 2020-07-01 RX ADMIN — PANTOPRAZOLE SODIUM 40 MG: 40 TABLET, DELAYED RELEASE ORAL at 09:51

## 2020-07-01 RX ADMIN — TOPIRAMATE 100 MG: 100 TABLET, FILM COATED ORAL at 23:14

## 2020-07-01 RX ADMIN — DICYCLOMINE HYDROCHLORIDE 10 MG: 10 CAPSULE ORAL at 13:26

## 2020-07-01 RX ADMIN — BUDESONIDE AND FORMOTEROL FUMARATE DIHYDRATE 2 PUFF: 160; 4.5 AEROSOL RESPIRATORY (INHALATION) at 09:30

## 2020-07-01 RX ADMIN — GABAPENTIN 300 MG: 300 CAPSULE ORAL at 13:26

## 2020-07-01 RX ADMIN — INSULIN LISPRO 3 UNITS: 100 INJECTION, SOLUTION INTRAVENOUS; SUBCUTANEOUS at 21:13

## 2020-07-01 RX ADMIN — Medication 10 ML: at 23:13

## 2020-07-01 RX ADMIN — MAGNESIUM GLUCONATE 500 MG ORAL TABLET 400 MG: 500 TABLET ORAL at 23:13

## 2020-07-01 RX ADMIN — PANTOPRAZOLE SODIUM 40 MG: 40 TABLET, DELAYED RELEASE ORAL at 23:13

## 2020-07-01 RX ADMIN — CITALOPRAM 20 MG: 20 TABLET, FILM COATED ORAL at 23:12

## 2020-07-01 RX ADMIN — Medication 10 ML: at 09:52

## 2020-07-01 RX ADMIN — ENOXAPARIN SODIUM 30 MG: 30 INJECTION SUBCUTANEOUS at 09:52

## 2020-07-01 RX ADMIN — INSULIN GLARGINE 25 UNITS: 100 INJECTION, SOLUTION SUBCUTANEOUS at 21:13

## 2020-07-01 RX ADMIN — DOCUSATE SODIUM 100 MG: 100 CAPSULE, LIQUID FILLED ORAL at 09:52

## 2020-07-01 RX ADMIN — CITALOPRAM 20 MG: 20 TABLET, FILM COATED ORAL at 09:52

## 2020-07-01 RX ADMIN — INSULIN GLARGINE 25 UNITS: 100 INJECTION, SOLUTION SUBCUTANEOUS at 10:32

## 2020-07-01 ASSESSMENT — ENCOUNTER SYMPTOMS
VOMITING: 0
BLOOD IN STOOL: 0
DIARRHEA: 0
CONSTIPATION: 0
SHORTNESS OF BREATH: 0
NAUSEA: 0
RHINORRHEA: 0
ABDOMINAL PAIN: 0
WHEEZING: 0
CHEST TIGHTNESS: 0
COUGH: 0

## 2020-07-01 ASSESSMENT — PAIN DESCRIPTION - LOCATION: LOCATION: CHEST

## 2020-07-01 ASSESSMENT — PAIN DESCRIPTION - PAIN TYPE: TYPE: ACUTE PAIN

## 2020-07-01 ASSESSMENT — PAIN DESCRIPTION - DESCRIPTORS: DESCRIPTORS: PRESSURE

## 2020-07-01 ASSESSMENT — PAIN SCALES - GENERAL
PAINLEVEL_OUTOF10: 0
PAINLEVEL_OUTOF10: 3

## 2020-07-01 ASSESSMENT — HEART SCORE: ECG: 1

## 2020-07-01 NOTE — PLAN OF CARE
Problem: Falls - Risk of:  Goal: Will remain free from falls  Description: Will remain free from falls  7/1/2020 1137 by Raul Hanley RN  Outcome: Ongoing  7/1/2020 0625 by Lloyd Prakash RN  Outcome: Ongoing  Goal: Absence of physical injury  Description: Absence of physical injury  7/1/2020 1137 by Raul Hanley RN  Outcome: Ongoing  7/1/2020 0625 by Lloyd Prakash RN  Outcome: Ongoing     Problem: Pain:  Goal: Control of acute pain  Description: Control of acute pain  7/1/2020 1137 by Raul Hanley RN  Outcome: Ongoing  7/1/2020 0625 by Lloyd Prakash RN  Outcome: Ongoing     Problem: Skin Integrity:  Goal: Will show no infection signs and symptoms  Description: Will show no infection signs and symptoms  Outcome: Ongoing  Goal: Absence of new skin breakdown  Description: Absence of new skin breakdown  Outcome: Ongoing

## 2020-07-01 NOTE — ED PROVIDER NOTES
[address] ED  Emergency Department  Emergency Medicine Resident Sign-out     Care of Suki Sharp was assumed from Dr. Lepe Napoleon is being seen for Chest Pain   . The patient's initial evaluation and plan have been discussed with the prior provider who initially evaluated the patient. EMERGENCY DEPARTMENT COURSE / MEDICAL DECISION MAKING:       MEDICATIONS GIVEN:  Orders Placed This Encounter   Medications    nitroGLYCERIN (NITROSTAT) SL tablet 0.4 mg    acetaminophen (TYLENOL) tablet 1,000 mg    meclizine (ANTIVERT) tablet 25 mg       LABS / RADIOLOGY:     Labs Reviewed   COMPREHENSIVE METABOLIC PANEL - Abnormal; Notable for the following components:       Result Value    Glucose 69 (*)     CREATININE 1.06 (*)     Total Bilirubin 0.16 (*)     GFR Non- 51 (*)     All other components within normal limits   TROPONIN   BRAIN NATRIURETIC PEPTIDE   TSH WITH REFLEX   CBC WITH AUTO DIFFERENTIAL   LIPASE   D-DIMER, QUANTITATIVE   TROPONIN   C-REACTIVE PROTEIN   LACTATE DEHYDROGENASE   COVID-19       XR CHEST STANDARD (2 VW)   Final Result   No acute focal airspace consolidation. Stable mild cardiomegaly. RECENT VITALS:     Temp: 99.6 °F (37.6 °C),  Pulse: 67, Resp: 17, BP: (!) 170/68, SpO2: 98 %    This patient is a 78 y.o. Female with chest pain, cardiac work-up was unremarkable, patient has low-grade fever, dry cough concern for COVID, patient also developed dizziness while in the emergency department, patient is admitted to [facility] for further evaluation. OUTSTANDING TASKS / RECOMMENDATIONS:      1. Await Bed placement       FINAL IMPRESSION:     No diagnosis found.     DISPOSITION:       DISPOSITION:  []  Discharge   []  Transfer -    [x]  Admission -  [facility]   []  Against Medical Advice   []  Eloped   FOLLOW-UP: Ksenia Meehan MD  [address] [facility] Pati Saucedo [facility] [phone]           DISCHARGE MEDICATIONS: New Prescriptions No medications on file          Yesi Carreon DO  Emergency Medicine Resident  [address] [facility]     Yesi Carreon [place]  Resident  07/01/20 8790

## 2020-07-01 NOTE — H&P
[facility]      HISTORY AND PHYSICAL EXAMINATION            Date:   7/1/2020  Patient name:  Ryann Groves  Date of admission:  6/30/2020  7:21 PM  MRN:   [id number]  Account:  [de-identified]  YOB: 1950  PCP:    Roddie Gottron, MD  Room:   3014/3014-01  Code Status:    Full Code    Chief Complaint:     Chief Complaint   Patient presents with    Chest Pain       History Obtained From:     patient, electronic medical record    History of Present Illness: The patient is a 78 y.o. Non-/non  female who presents with Chest Pain   and she is admitted to the hospital for the management of  Chest pain at rest.  Patient was brought to emergency room by EMS after she had acute onset substernal chest pain. Patient reported that pain was radiated to her right jaw. She denied any diaphoresis, palpitation. Patient did report mild difficulty breathing. She has underlying history of paroxysmal atrial fibrillation, obesity, COPD, CKD, chronic degenerative disc disease, gastritis. She is former smoker and has quit more than 20 years before. Patient denies alcohol use, substance abuse. She has underlying history of nonobstructive CAD. Patient had cardiac cath in year 2012 that showed 60% first diagonal branch LAD. Patient had recent cardiac stress test 4 months before. Evaluation emergency room showed temperature 99.6, stable blood pressure. Lab evaluation showed normal electrolytes, kidney function, troponin negative. Patient had normal hemoglobin, white count. Chest x-ray was negative for acute airspace consolidation. COVID-19 was suspected due to history of cough for 1 month and is currently in droplet plus isolation. Past Medical History:     Past Medical History:   Diagnosis Date    Allergic rhinitis     Anxiety 7/17/2013    Arthritis     Asthma     Atrial fibrillation (HCC)     Back pain     NERVE/DR. GRACIA    Benign hypertension with CKD (chronic kidney disease) stage III (HCC)     CAD (coronary artery disease) 3/21/2013    Caffeine use     2 coffee/day    CHF (congestive heart failure) ([facility])     Chronic kidney disease     CKD (chronic kidney disease) stage 3, GFR 30-59 ml/min (HCC)     COPD (chronic obstructive pulmonary disease) ([facility])     emphysema    Degeneration of lumbar or lumbosacral intervertebral disc     Depression     DM (diabetes mellitus) ([facility])     Emphysema of lung ([facility])     Gastritis     GERD (gastroesophageal reflux disease)     Hearing loss     Hematuria     Hiatal hernia     HTN (hypertension), benign 6/28/2014    Hypertriglyceridemia     Incontinence of urine 9/25/2013    Knee arthropathy     Lumbosacral spondylosis without myelopathy 9/29/2014    Migraine headache     DR. BASIL [place]     Neuropathy     Obesity     On home oxygen therapy     2 L PER NC  HS AND PRN    HIGINIO on CPAP     Otitis media 1-26-15    Secondary diabetes mellitus with stage 3 chronic kidney disease (GFR 30-59) ([facility])     Stroke ([facility])     QUESTIONABLE    Tinnitus     Type 2 diabetes mellitus without complication ([facility])     Type II or unspecified type diabetes mellitus without mention of complication, not stated as uncontrolled     UTI (lower urinary tract infection)     Varicose vein         Past Surgical History:     Past Surgical History:   Procedure Laterality Date    ABLATION OF DYSRHYTHMIC FOCUS  2000    CARPAL TUNNEL RELEASE Right     CATARACT REMOVAL WITH IMPLANT Bilateral     CHOLECYSTECTOMY  2007    COLONOSCOPY      COLONOSCOPY  04/10/2017    tubular adenomo polyp , mod. sigmoid diverticulosis, min. int. hemorrhoids    CYSTOSCOPY  9/25/2013    DILATION AND CURETTAGE  1979    EYE SURGERY      laser    INCONTINENCE SURGERY      Percutaneous nerve stimulation Dr Selene Da Silva Nov 2013     INSERTABLE CARDIAC MONITOR  12/04/2015    [employer] REVEAL LINQ MODEL #[id number] MRI CONDTIONAL OK 3T.  IMMEDIATELY POST IMPLANT    OTHER SURGICAL HISTORY  09/10/15    removal of interstim    NE COLSC FLX W/REMOVAL LESION BY HOT BX FORCEPS N/A 4/10/2017    COLONOSCOPY POLYPECTOMY HOT BIOPSY performed by Nithin Washburn MD at [address] [facility] [address]      TYMPANOPLASTY      TYMPANOSTOMY TUBE PLACEMENT  08/21/2017    UPPER GASTROINTESTINAL ENDOSCOPY  03/2016    Dr Emerson Boothe        Medications Prior to Admission:     Prior to Admission medications    Medication Sig Start Date End Date Taking? Authorizing Provider   oxyCODONE-acetaminophen (PERCOCET) 5-325 MG per tablet Take 1 tablet by mouth See Admin Instructions for 30 days. Intended supply: 30 days. 1 tab 3-4 times a day prn 7/3/20 8/2/20  VERONIKA Medina CNP   nystatin (MYCOSTATIN) 173488 UNIT/GM powder Apply 3 times daily. 6/25/20   Rob Bailey MD   dicyclomine (BENTYL) 10 MG capsule Take 1 capsule by mouth 2 times daily as needed (abdominal spasm) 6/25/20   Rob Bailey MD   clopidogrel (PLAVIX) 75 MG tablet TAKE 1 TAB BY MOUTH ONCE A DAY ( AM ) -THIS MEDICINE MAY BE TAKENWITH OR WITHOUT FOOD 3/27/20   Rob Bailey MD   gabapentin (NEURONTIN) 300 MG capsule Take 1 capsule by mouth 3 times daily for 30 days. 3/2/20 6/30/20  VERONIKA Campos CNP   topiramate (TOPAMAX) 100 MG tablet Take 1 tablet by mouth nightly 3/2/20   VERONIKA Campos CNP   tobramycin-dexamethasone (TOBRADEX) 0.3-0.1 % ophthalmic suspension  1/30/20   Historical Provider, MD   insulin aspart (NOVOLOG FLEXPEN) 100 UNIT/ML injection pen If <139 - No Insulin; 140-199-2 Units;200-249-4 Units;250-299-6 Units;300-349-8 Units;350-400=10 Units; Above 400-12 Units 2/12/20   Rob Bailey MD   insulin glargine (LANTUS SOLOSTAR) 100 UNIT/ML injection pen Inject 42 Units into the skin 2 times daily 2/12/20   Rob Bailey MD   aspirin 81 MG chewable tablet Take 1 tablet by mouth daily 12/3/19   Rob Bailey MD   atorvastatin (LIPITOR) 80 MG tablet Take 1 tablet by mouth daily 12/3/19   Pamella Tang MD   lidocaine (LMX) 4 % cream Apply topically every 8 hrs as needed for pain 11/11/19   Pamella Tang MD   naloxone 4 MG/0.1ML LIQD nasal spray 1 spray by Nasal route as needed for Opioid Reversal 10/10/19   Diane Hoyt MD   Lift Chair MISC by Does not apply route 9/24/19   Pamella Tang MD   Misc.  Devices (ADJUST BATH/SHOWER SEAT/BACK) MISC Use daily for shower 9/24/19   Pamella Tang MD   ferrous sulfate 325 (65 Fe) MG tablet TAKE 1 TAB BY MOUTH EVERY MORNING 8/8/19   Pamella Tang MD   magnesium oxide (MAG-OX) 400 MG tablet TAKE 1 TAB BY MOUTH TWICE A DAY ( AM AND BEDTIME ) 8/8/19   Pamella Tang MD   carvedilol (COREG) 3.125 MG tablet TAKE 1 TAB BY MOUTH TWICE A DAY ( AM AND BEDTIME ) 8/8/19   Pamella Tang MD   metFORMIN (GLUCOPHAGE) 1000 MG tablet TAKE 1 TAB BY MOUTH TWICE A DAY ( AM AND BEDTIME ) 8/8/19   Pamella Tang MD   ULTICARE MINI PEN NEEDLES 31G X 6 MM MISC USE AS DIRECTED 8/8/19   Pamella Tang MD   citalopram (CELEXA) 40 MG tablet TAKE 1/2  TAB BY MOUTH TWICE  A DAY 4/2/19   Pamella Tang MD   pantoprazole (PROTONIX) 40 MG tablet Take 1 tablet by mouth 2 times daily 3/15/19   Odell Ash DO   TRUE METRIX BLOOD GLUCOSE TEST strip THREE TIMES A DAY 12/13/18   Pamella Tang MD   Alcohol Swabs (B-D SINGLE USE SWABS REGULAR) PADS THREE TIMES A DAY 12/12/18   Pamella Tang MD   furosemide (LASIX) 20 MG tablet Take 1 tablet by mouth daily as needed (weight gain 3 lbs overnight) 11/16/18   Aguila Fisher MD   losartan (COZAAR) 25 MG tablet TAKE 1 TAB BY MOUTH ONCE A DAY 11/6/18   Pamella Tang MD   Lift Chair MISC by Does not apply route Use daily at home to help with ADL's 11/6/18   Pamella Tang MD   nitroGLYCERIN (NITROSTAT) 0.4 MG SL tablet 1 under the tongue as needed for angina, may repeat q5mins for up three doses 8/28/18   Historical Provider, MD   Blood Glucose Monitoring Suppl HARMEET Use daily to check BS 3/27/18 Rob Bailey MD   ipratropium-albuterol (DUONEB) 0.5-2.5 (3) MG/3ML SOLN nebulizer solution Inhale 3 mLs into the lungs every 4 hours 2/6/18   Mason Voss MD   Melatonin 10 MG TABS Take 10 mg by mouth nightly 10/19/17   Rob Bailey MD   Compression Stockings MISC by Does not apply route Pressure between 20 - 25 . 9/11/17   Rob Bailey MD   docusate sodium (COLACE) 100 MG capsule Take 1 capsule by mouth 2 times daily Please use while taking Percocet 9/10/15   Wing Alonso MD   SYMBICORT 160-4.5 MCG/ACT AERO   2 puffs As needed for sob 5/28/15   Historical Provider, MD   OXYGEN Inhale 3 L into the lungs     Historical Provider, MD        Allergies:     Robitussin [guaifenesin]; Codeine; Compazine [prochlorperazine maleate]; Iodides; Morphine; Moxifloxacin; Reglan [metoclopramide]; Avelox [moxifloxacin hcl in nacl]; Cipro xr; and Sulfa antibiotics    Social History:     Tobacco:    reports that she quit smoking about 20 years ago. Her smoking use included cigarettes. She has a 123.00 pack-year smoking history. She has never used smokeless tobacco.  Alcohol:      reports no history of alcohol use. Drug Use:  reports no history of drug use. Family History:     Family History   Problem Relation Age of Onset    Diabetes Mother     Heart Disease Mother     Stomach Cancer Father     Diabetes Maternal Grandmother         also aunts and uncles (maternal)    Emphysema Sister        Review of Systems:     Positive and Negative as described in HPI. Review of Systems   Constitutional: Negative for appetite change, fatigue, fever and unexpected weight change. HENT: Negative for congestion, rhinorrhea and sneezing. Eyes: Negative for visual disturbance. Respiratory: Negative for cough, chest tightness, shortness of breath and wheezing. Cardiovascular: Negative for chest pain and palpitations.    Gastrointestinal: Negative for abdominal pain, blood in stool, constipation, diarrhea, nausea and sounds. No wheezing or rales. Abdominal:      Palpations: Abdomen is soft. There is no mass. Tenderness: There is no abdominal tenderness. Lymphadenopathy:      Head:      Right side of head: No submandibular adenopathy. Left side of head: No submandibular adenopathy. Cervical: No cervical adenopathy. Skin:     General: Skin is warm. Neurological:      Mental Status: She is alert and oriented to person, place, and time. Motor: No tremor. Psychiatric:         Behavior: Behavior is cooperative.          Investigations:      Laboratory Testing:  Recent Results (from the past 24 hour(s))   EKG 12 Lead    Collection Time: 06/30/20  7:22 PM   Result Value Ref Range    Ventricular Rate 69 BPM    Atrial Rate 69 BPM    P-R Interval 134 ms    QRS Duration 64 ms    Q-T Interval 408 ms    QTc Calculation (Bazett) 437 ms    P Axis 45 degrees    R Axis -36 degrees    T Axis 75 degrees   Brain Natriuretic Peptide    Collection Time: 06/30/20  7:46 PM   Result Value Ref Range    Pro- <300 pg/mL    BNP Interpretation Pro-BNP Reference Range:    TSH with Reflex    Collection Time: 06/30/20  7:46 PM   Result Value Ref Range    TSH 2.14 0.30 - 5.00 mIU/L   CBC WITH AUTO DIFFERENTIAL    Collection Time: 06/30/20  7:46 PM   Result Value Ref Range    WBC 8.8 3.5 - 11.3 k/uL    RBC 4.61 3.95 - 5.11 m/uL    Hemoglobin 14.2 11.9 - 15.1 g/dL    Hematocrit 45.7 36.3 - 47.1 %    MCV 99.1 82.6 - 102.9 fL    MCH 30.8 25.2 - 33.5 pg    MCHC 31.1 28.4 - 34.8 g/dL    RDW 13.0 11.8 - 14.4 %    Platelets 529 760 - 432 k/uL    MPV 11.5 8.1 - 13.5 fL    NRBC Automated 0.0 0.0 per 100 WBC    Differential Type NOT REPORTED     Seg Neutrophils 60 36 - 65 %    Lymphocytes 29 24 - 43 %    Monocytes 8 3 - 12 %    Eosinophils % 2 1 - 4 %    Basophils 1 0 - 2 %    Immature Granulocytes 0 0 %    Segs Absolute 5.33 1.50 - 8.10 k/uL    Absolute Lymph # 2.54 1.10 - 3.70 k/uL    Absolute Mono # 0.71 0.10 - 1.20 k/uL    Absolute Eos # 0.15 0.00 - 0.44 k/uL    Basophils Absolute 0.06 0.00 - 0.20 k/uL    Absolute Immature Granulocyte <0.03 0.00 - 0.30 k/uL    WBC Morphology NOT REPORTED     RBC Morphology NOT REPORTED     Platelet Estimate NOT REPORTED    Comprehensive Metabolic Panel    Collection Time: 06/30/20  7:46 PM   Result Value Ref Range    Glucose 69 (L) 70 - 99 mg/dL    BUN 22 8 - 23 mg/dL    CREATININE 1.06 (H) 0.50 - 0.90 mg/dL    Bun/Cre Ratio NOT REPORTED 9 - 20    Calcium 9.2 8.6 - 10.4 mg/dL    Sodium 137 135 - 144 mmol/L    Potassium 4.5 3.7 - 5.3 mmol/L    Chloride 99 98 - 107 mmol/L    CO2 25 20 - 31 mmol/L    Anion Gap 13 9 - 17 mmol/L    Alkaline Phosphatase 67 35 - 104 U/L    ALT 23 5 - 33 U/L    AST 27 <32 U/L    Total Bilirubin 0.16 (L) 0.3 - 1.2 mg/dL    Total Protein 6.6 6.4 - 8.3 g/dL    Alb 3.8 3.5 - 5.2 g/dL    Albumin/Globulin Ratio 1.4 1.0 - 2.5    GFR Non- 51 (L) >60 mL/min    GFR African American >60 >60 mL/min    GFR Comment          GFR Staging NOT REPORTED    LIPASE    Collection Time: 06/30/20  7:46 PM   Result Value Ref Range    Lipase 50 13 - 60 U/L   D-Dimer, Quantitative    Collection Time: 06/30/20  7:46 PM   Result Value Ref Range    D-Dimer, Quant 0.62 mg/L FEU   Troponin    Collection Time: 06/30/20  7:46 PM   Result Value Ref Range    Troponin, High Sensitivity 8 0 - 14 ng/L    Troponin T NOT REPORTED <0.03 ng/mL    Troponin Interp NOT REPORTED    C-Reactive Protein    Collection Time: 06/30/20  7:46 PM   Result Value Ref Range    CRP 1.6 0.0 - 5.0 mg/L   Lactate Dehydrogenase    Collection Time: 06/30/20  7:46 PM   Result Value Ref Range     135 - 214 U/L   Troponin    Collection Time: 06/30/20 11:16 PM   Result Value Ref Range    Troponin, High Sensitivity 7 0 - 14 ng/L    Troponin T NOT REPORTED <0.03 ng/mL    Troponin Interp NOT REPORTED    POC Glucose Fingerstick    Collection Time: 07/01/20  4:39 AM   Result Value Ref Range    POC Glucose 52 (L) 65 - 105 mg/dL   POC Glucose Fingerstick    Collection Time: 07/01/20  5:09 AM   Result Value Ref Range    POC Glucose 94 65 - 105 mg/dL   Troponin    Collection Time: 07/01/20  5:14 AM   Result Value Ref Range    Troponin, High Sensitivity 8 0 - 14 ng/L    Troponin T NOT REPORTED <0.03 ng/mL    Troponin Interp NOT REPORTED    POC Glucose Fingerstick    Collection Time: 07/01/20  8:41 AM   Result Value Ref Range    POC Glucose 152 (H) 65 - 105 mg/dL   Troponin    Collection Time: 07/01/20  9:57 AM   Result Value Ref Range    Troponin, High Sensitivity 8 0 - 14 ng/L    Troponin T NOT REPORTED <0.03 ng/mL    Troponin Interp NOT REPORTED    EKG 12 Lead    Collection Time: 07/01/20 10:56 AM   Result Value Ref Range    Ventricular Rate 68 BPM    Atrial Rate 68 BPM    P-R Interval 150 ms    QRS Duration 68 ms    Q-T Interval 408 ms    QTc Calculation (Bazett) 433 ms    P Axis 74 degrees    R Axis -29 degrees    T Axis 76 degrees   POC Glucose Fingerstick    Collection Time: 07/01/20 12:18 PM   Result Value Ref Range    POC Glucose 202 (H) 65 - 105 mg/dL       Imaging/Diagonstics:    Xr Chest Standard (2 Vw)    Result Date: 6/30/2020  No acute focal airspace consolidation. Stable mild cardiomegaly. Assessment :      Primary Problem  Chest pain at rest    Active Hospital Problems    Diagnosis Date Noted    Type 2 diabetes mellitus with diabetic polyneuropathy, with long-term current use of insulin ([facility]) [E11.42, Z79.4]      Priority: High    Shortness of breath [R06.02] 07/01/2020    Chest pain at rest [R07.9] 07/01/2020    Benign hypertension with CKD (chronic kidney disease) stage III ([facility]) [I12.9, N18.3]     Stage 3 chronic kidney disease ([facility] [N18.3] 06/26/2019    Obesity, morbid, BMI 40.0-49.9 ([facility] [E66.01] 12/07/2016       Plan:     Patient status Admit as observation in the  Progressive Unit/Step down    Chest pain at rest-acute coronary syndrome ruled out with negative troponin. Stress test 4 months ago negative. Continue medical management. Follow-up with primary cardiologist Dr. Mavis Prajapati -   Chronic cough secondary to chronic bronchitis-  Suspected COVID-19-RT SARS-CoV-2 PCR pending  Essential hypertension-stable  Chronic kidney disease stage III  Essential hypertension -       Consultations:   IP CONSULT TO HOSPITALIST    Patient is admitted as observation status because of co-morbidities listed above, severity of signs and symptoms as outlined, requirement for current medical therapies and most importantly because of direct risk to patient if care not provided in a hospital setting.     Tavo Altamirano MD  7/1/2020    Copy sent to Dr. Trey Mitchell MD    (Please note that portions of this note were completed with a voice recognition program. Efforts were made to edit the dictations but occasionally words are mis-transcribed.)

## 2020-07-01 NOTE — ED PROVIDER NOTES
Faculty Sign-Out Attestation  Handoff taken on the following patient from prior Attending Physician: Akshat Michael  I was available and discussed any additional care issues that arose and coordinated the management plans with the resident(s) caring for the patient during my duty period. Any areas of disagreement with residents documentation of care or procedures are noted on the chart. I was personally present for the key portions of any/all procedures during my duty period. I have documented in the chart those procedures where I was not present during the newell portions. Dr Akshat Michael sign out, c/o cp, d dimer age adjusted -, needing trop #2. .  If s/s improve possible dc. ..   If -, pt has cough, would need rapid,     Mohinder Wood DO  Attending Physician      Mohinder Wood, DO  06/30/20 1519  --trop 2 stable, pt became dizzy on standing difficulty ambulating, will plan obs admit,      Mohinder Wood DO  07/01/20 0019

## 2020-07-01 NOTE — ED NOTES
Pt walked back to room with assistance. Pt placed back on cardiac monitor.      Gorge Gautam RN  06/30/20 2438

## 2020-07-01 NOTE — PLAN OF CARE
Problem: Falls - Risk of:  Goal: Will remain free from falls  Description: Will remain free from falls  Outcome: Ongoing  Goal: Absence of physical injury  Description: Absence of physical injury  Outcome: Ongoing     Problem: Pain:  Goal: Control of acute pain  Description: Control of acute pain  Outcome: Ongoing

## 2020-07-01 NOTE — FLOWSHEET NOTE
Patient admitted  from the ED without ruled out to COVID 19. Writer Notified Ru Mendoza NP.    Dimitri Lamas RN

## 2020-07-01 NOTE — ED NOTES
Pt to Ed with c/o mid sternal chest pain with radiation to the left arm and jaw. Pt reports not feeling well when she woke up today and states she had told her at home nurse that she wasn't feeling well and didn't feel like getting out of her night gown. Pt states she has a dr scout with her lung dr this Thursday for a possible biopsy of a spot they found on her lung. Pt reports hx of CHF and COPD, reports having sleep apnea but denies at home O2 use during the day. Pt received, 1 nitro, and 324 ASA PTA. Pt placed on full cardiac monitor EKG complete, IV placed, blood obtained and sent to lab.       Johanna Meredith RN  06/30/20 0023

## 2020-07-01 NOTE — CARE COORDINATION
Case Management Initial Discharge Plan  Lanna Felty,         R/o rocio called patient to verify information       Information verified: address, contacts, phone number, , insurance Yes    Emergency Contact/Next of Kin name & number: son Claude Riis      PCP: Emerald Gonzalez MD  Date of last visit:     Insurance Provider: Carlos prater  Called  gerson alexander  left message. received call from [facility] for her . Discharge Planning    Living Arrangements:    lives alone lynn myers    Home is an apartment  no stairs to climb to get into front door,     Patient able to perform ADL's:Assisted    Current Services (outpatient & in home) [facility] 3 hrs mwf nurse every 60days (verified spoke to [address] [place] [address]),  delivered meals    DME equipment: scooter, walker, neb, cpap, o2, apria, life alert, incontinence supplies  Pharmacy: [facility]    Receiving oral anticoagulation therapy? No          Potential Assistance Needed:   resume home care     Patient agreeable to home care: Yes, [facility]   [place] of choice provided:  yes    Prior SNF/Rehab Placement and Facility: [facility] 1yr ago  Agreeable to SNF/Rehab: plan return home  [place] of choice provided: n/a     Evaluation: no    Expected Discharge date:       Patient expects to be discharged to:   home  Follow Up Appointment: Best Day/ Time:      Transportation provider: olaf  Transportation arrangements needed for discharge: Yes    Readmission Risk              Risk of Unplanned Readmission:        30             Does patient have a readmission risk score greater than 14?: Yes  If yes, follow-up appointment must be made within 7 days of discharge.      Goals of Care: return home without shortness of breath       Discharge Plan: return home current with [facility]          Electronically signed by Teja Gibson RN on 20 at 11:54 AM EDT

## 2020-07-02 ENCOUNTER — TELEPHONE (OUTPATIENT)
Dept: FAMILY MEDICINE CLINIC | Age: 70
End: 2020-07-02

## 2020-07-02 VITALS
RESPIRATION RATE: 17 BRPM | WEIGHT: 256.62 LBS | HEIGHT: 62 IN | BODY MASS INDEX: 47.22 KG/M2 | SYSTOLIC BLOOD PRESSURE: 105 MMHG | TEMPERATURE: 97.9 F | OXYGEN SATURATION: 96 % | HEART RATE: 76 BPM | DIASTOLIC BLOOD PRESSURE: 46 MMHG

## 2020-07-02 LAB
ALBUMIN SERPL-MCNC: 3.8 G/DL (ref 3.5–5.2)
ALBUMIN/GLOBULIN RATIO: 1.4 (ref 1–2.5)
ALP BLD-CCNC: 64 U/L (ref 35–104)
ALT SERPL-CCNC: 24 U/L (ref 5–33)
ANION GAP SERPL CALCULATED.3IONS-SCNC: 8 MMOL/L (ref 9–17)
AST SERPL-CCNC: 22 U/L
BILIRUB SERPL-MCNC: 0.19 MG/DL (ref 0.3–1.2)
BUN BLDV-MCNC: 17 MG/DL (ref 8–23)
BUN/CREAT BLD: ABNORMAL (ref 9–20)
CALCIUM SERPL-MCNC: 9.1 MG/DL (ref 8.6–10.4)
CHLORIDE BLD-SCNC: 101 MMOL/L (ref 98–107)
CHOLESTEROL/HDL RATIO: 7.8
CHOLESTEROL: 264 MG/DL
CO2: 31 MMOL/L (ref 20–31)
CREAT SERPL-MCNC: 1.02 MG/DL (ref 0.5–0.9)
EKG ATRIAL RATE: 63 BPM
EKG ATRIAL RATE: 68 BPM
EKG P AXIS: 65 DEGREES
EKG P AXIS: 74 DEGREES
EKG P-R INTERVAL: 148 MS
EKG P-R INTERVAL: 150 MS
EKG Q-T INTERVAL: 408 MS
EKG Q-T INTERVAL: 408 MS
EKG QRS DURATION: 68 MS
EKG QRS DURATION: 70 MS
EKG QTC CALCULATION (BAZETT): 417 MS
EKG QTC CALCULATION (BAZETT): 433 MS
EKG R AXIS: -24 DEGREES
EKG R AXIS: -29 DEGREES
EKG T AXIS: 76 DEGREES
EKG T AXIS: 76 DEGREES
EKG VENTRICULAR RATE: 63 BPM
EKG VENTRICULAR RATE: 68 BPM
GFR AFRICAN AMERICAN: >60 ML/MIN
GFR NON-AFRICAN AMERICAN: 54 ML/MIN
GFR SERPL CREATININE-BSD FRML MDRD: ABNORMAL ML/MIN/{1.73_M2}
GFR SERPL CREATININE-BSD FRML MDRD: ABNORMAL ML/MIN/{1.73_M2}
GLUCOSE BLD-MCNC: 167 MG/DL (ref 65–105)
GLUCOSE BLD-MCNC: 193 MG/DL (ref 70–99)
GLUCOSE BLD-MCNC: 197 MG/DL (ref 65–105)
HCT VFR BLD CALC: 47.3 % (ref 36.3–47.1)
HDLC SERPL-MCNC: 34 MG/DL
HEMOGLOBIN: 14.3 G/DL (ref 11.9–15.1)
LDL CHOLESTEROL: 162 MG/DL (ref 0–130)
MAGNESIUM: 1.9 MG/DL (ref 1.6–2.6)
MCH RBC QN AUTO: 30.4 PG (ref 25.2–33.5)
MCHC RBC AUTO-ENTMCNC: 30.2 G/DL (ref 28.4–34.8)
MCV RBC AUTO: 100.6 FL (ref 82.6–102.9)
NRBC AUTOMATED: 0 PER 100 WBC
PDW BLD-RTO: 12.6 % (ref 11.8–14.4)
PLATELET # BLD: 165 K/UL (ref 138–453)
PMV BLD AUTO: 11.7 FL (ref 8.1–13.5)
POTASSIUM SERPL-SCNC: 4.7 MMOL/L (ref 3.7–5.3)
RBC # BLD: 4.7 M/UL (ref 3.95–5.11)
SODIUM BLD-SCNC: 140 MMOL/L (ref 135–144)
TOTAL PROTEIN: 6.5 G/DL (ref 6.4–8.3)
TRIGL SERPL-MCNC: 340 MG/DL
VLDLC SERPL CALC-MCNC: ABNORMAL MG/DL (ref 1–30)
WBC # BLD: 6.1 K/UL (ref 3.5–11.3)

## 2020-07-02 PROCEDURE — 6370000000 HC RX 637 (ALT 250 FOR IP): Performed by: FAMILY MEDICINE

## 2020-07-02 PROCEDURE — 80053 COMPREHEN METABOLIC PANEL: CPT

## 2020-07-02 PROCEDURE — 93005 ELECTROCARDIOGRAM TRACING: CPT | Performed by: NURSE PRACTITIONER

## 2020-07-02 PROCEDURE — G0378 HOSPITAL OBSERVATION PER HR: HCPCS

## 2020-07-02 PROCEDURE — 6360000002 HC RX W HCPCS: Performed by: NURSE PRACTITIONER

## 2020-07-02 PROCEDURE — 94640 AIRWAY INHALATION TREATMENT: CPT

## 2020-07-02 PROCEDURE — 2700000000 HC OXYGEN THERAPY PER DAY

## 2020-07-02 PROCEDURE — 6370000000 HC RX 637 (ALT 250 FOR IP): Performed by: NURSE PRACTITIONER

## 2020-07-02 PROCEDURE — 93010 ELECTROCARDIOGRAM REPORT: CPT | Performed by: INTERNAL MEDICINE

## 2020-07-02 PROCEDURE — 96372 THER/PROPH/DIAG INJ SC/IM: CPT

## 2020-07-02 PROCEDURE — 94761 N-INVAS EAR/PLS OXIMETRY MLT: CPT

## 2020-07-02 PROCEDURE — 99225 PR SBSQ OBSERVATION CARE/DAY 25 MINUTES: CPT | Performed by: FAMILY MEDICINE

## 2020-07-02 PROCEDURE — 80061 LIPID PANEL: CPT

## 2020-07-02 PROCEDURE — 82947 ASSAY GLUCOSE BLOOD QUANT: CPT

## 2020-07-02 PROCEDURE — 85027 COMPLETE CBC AUTOMATED: CPT

## 2020-07-02 PROCEDURE — 2580000003 HC RX 258: Performed by: NURSE PRACTITIONER

## 2020-07-02 PROCEDURE — 83735 ASSAY OF MAGNESIUM: CPT

## 2020-07-02 RX ADMIN — CLOPIDOGREL 75 MG: 75 TABLET, FILM COATED ORAL at 09:00

## 2020-07-02 RX ADMIN — PANTOPRAZOLE SODIUM 40 MG: 40 TABLET, DELAYED RELEASE ORAL at 09:00

## 2020-07-02 RX ADMIN — FERROUS SULFATE TAB EC 325 MG (65 MG FE EQUIVALENT) 325 MG: 325 (65 FE) TABLET DELAYED RESPONSE at 09:00

## 2020-07-02 RX ADMIN — MAGNESIUM GLUCONATE 500 MG ORAL TABLET 400 MG: 500 TABLET ORAL at 09:00

## 2020-07-02 RX ADMIN — Medication 10 ML: at 09:01

## 2020-07-02 RX ADMIN — CARVEDILOL 3.12 MG: 3.12 TABLET, FILM COATED ORAL at 09:00

## 2020-07-02 RX ADMIN — INSULIN LISPRO 2 UNITS: 100 INJECTION, SOLUTION INTRAVENOUS; SUBCUTANEOUS at 09:00

## 2020-07-02 RX ADMIN — ENOXAPARIN SODIUM 30 MG: 30 INJECTION SUBCUTANEOUS at 09:00

## 2020-07-02 RX ADMIN — Medication 3 MG: at 00:59

## 2020-07-02 RX ADMIN — GABAPENTIN 300 MG: 300 CAPSULE ORAL at 09:00

## 2020-07-02 RX ADMIN — DOCUSATE SODIUM 100 MG: 100 CAPSULE, LIQUID FILLED ORAL at 09:00

## 2020-07-02 RX ADMIN — ATORVASTATIN CALCIUM 80 MG: 80 TABLET, FILM COATED ORAL at 09:01

## 2020-07-02 RX ADMIN — INSULIN GLARGINE 25 UNITS: 100 INJECTION, SOLUTION SUBCUTANEOUS at 09:15

## 2020-07-02 RX ADMIN — LOSARTAN POTASSIUM 25 MG: 25 TABLET, FILM COATED ORAL at 09:00

## 2020-07-02 RX ADMIN — BUDESONIDE AND FORMOTEROL FUMARATE DIHYDRATE 2 PUFF: 160; 4.5 AEROSOL RESPIRATORY (INHALATION) at 09:09

## 2020-07-02 RX ADMIN — CITALOPRAM 20 MG: 20 TABLET, FILM COATED ORAL at 09:00

## 2020-07-02 RX ADMIN — ASPIRIN 325 MG: 325 TABLET, COATED ORAL at 09:03

## 2020-07-02 ASSESSMENT — ENCOUNTER SYMPTOMS
WHEEZING: 0
CHEST TIGHTNESS: 0
VOMITING: 0
NAUSEA: 0
RHINORRHEA: 0
SHORTNESS OF BREATH: 0
COUGH: 0
BLOOD IN STOOL: 0
CONSTIPATION: 0
ABDOMINAL PAIN: 0
DIARRHEA: 0

## 2020-07-02 ASSESSMENT — PAIN SCALES - GENERAL: PAINLEVEL_OUTOF10: 0

## 2020-07-02 NOTE — DISCHARGE INSTR - COC
Continuity of Care Form    Patient Name: Arpit Romeo   :  1950  MRN:  [id number]    Admit date:  2020  Discharge date:  ***    Code Status Order: Full Code   Advance Directives:   Advance Care Flowsheet Documentation     Date/Time Healthcare Directive Type of Healthcare Directive Copy in [address] Agent's Name Healthcare Agent's Phone Number    20 0602  No, patient does not have an advance directive for healthcare treatment -- -- -- -- --    20 0400  No, patient does not have an advance directive for healthcare treatment -- -- -- -- --          Admitting Physician:  Maryann Mao DO  PCP: Kiran Davis MD    Discharging Nurse: [facility] Unit/Room#: 2017/1380-75  Discharging Unit Phone Number: ***    Emergency Contact:   Extended Emergency Contact Information  Primary Emergency Contact: Aden Kirkland  Address: [place] Guerra [address] Phone: [phone]  Mobile Phone: [phone]  Relation: Child    Past Surgical History:  Past Surgical History:   Procedure Laterality Date    ABLATION OF DYSRHYTHMIC FOCUS  2000    CARPAL TUNNEL RELEASE Right     CATARACT REMOVAL WITH IMPLANT Bilateral     CHOLECYSTECTOMY  2007    COLONOSCOPY      COLONOSCOPY  04/10/2017    tubular adenomo polyp , mod. sigmoid diverticulosis, min. int. hemorrhoids    CYSTOSCOPY  2013    DILATION AND CURETTAGE  1979    EYE SURGERY      laser    INCONTINENCE SURGERY      Percutaneous nerve stimulation Dr Gertrude Don 2013     INSERTABLE CARDIAC MONITOR  2015    [employer] REVEAL LINQ MODEL #[id number] MRI CONDTIONAL OK 3T.  IMMEDIATELY POST IMPLANT    OTHER SURGICAL HISTORY  09/10/15    removal of interstim    [place] COLSC FLX W/REMOVAL LESION BY HOT BX FORCEPS N/A 4/10/2017    COLONOSCOPY POLYPECTOMY HOT BIOPSY performed by Ania Pierre MD at [address] [place] [address]      TYMPANOPLASTY      TYMPANOPLASTY      TYMPANOSTOMY TUBE PLACEMENT  08/21/2017    UPPER GASTROINTESTINAL ENDOSCOPY  03/2016    Dr Sha Ponce       Immunization History:   Immunization History   Administered Date(s) Administered    Influenza 10/18/2012, 10/07/2013    Influenza Virus Vaccine 10/02/2014, 10/20/2015    Influenza Whole 09/01/2010    Influenza, High Dose (Fluzone 65 yrs and older) 11/10/2016, 10/19/2017, 10/08/2018    Influenza, Quadv, IM, PF (6 mo and older Fluzone, Flulaval, Fluarix, and 3 yrs and older Afluria) 11/02/2019    Pneumococcal Conjugate 13-valent ([id number]) 06/15/2016    Pneumococcal Polysaccharide ([id number]) 01/01/2009, 09/11/2017       Active Problems:  Patient Active Problem List   Diagnosis Code    Chronic pain of left knee M25.562, G89.29    Type 2 diabetes mellitus with diabetic polyneuropathy, with long-term current use of insulin ([facility]) E11.42, Z79.4    Nonintractable migraine, unspecified migraine type G43.009    Coronary artery disease due to lipid rich plaque I25.10, I25.83    DDD (degenerative disc disease), lumbar M51.36    Chronic, continuous use of opioids F11.90    DDD (degenerative disc disease), cervical M50.30    Osteoarthritis of cervical spine M47.812    Medication monitoring encounter Z51.81    GERD (gastroesophageal reflux disease) K21.9    HTN (hypertension), benign I10    Mixed hyperlipidemia E78.2    Insomnia G47.00    Lumbosacral spondylosis without myelopathy M47.817    Sacroiliitis, not elsewhere classified ([facility]) M46.1    Carpal tunnel syndrome G56.00    Mixed stress and urge urinary incontinence N39.46    Bilateral low back pain with sciatica M54.40    Intractable migraine with aura without status migrainosus G43.119    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    Anxiety and depression F41.9, F32.9    Chronic migraine without aura without status migrainosus, not intractable G43.709    Pulmonary embolism ([facility]) I26.99    Obesity, morbid, BMI 40.0-49.9 ([facility]) E66.01    Lung nodule R91.1    Neuropathy G62.9    Obstructive sleep apnea syndrome G47.33    Asthma with COPD ([facility]) J44.9    Gastroesophageal reflux disease with esophagitis K21.0    Morbid obesity with BMI of 40.0-44.9, adult ([facility]) E66.01, Z68.41    Congestive heart failure ([facility]) I50.9    Stage 3 chronic kidney disease ([facility]) N18.3    Benign hypertension with CKD (chronic kidney disease) stage III ([facility]) I12.9, N18.3    Secondary diabetes mellitus with stage 3 chronic kidney disease (GFR 30-59) ([facility]) E13.22, N18.3    CKD (chronic kidney disease) stage 3, GFR 30-59 ml/min ([facility]) N18.3    Episode of altered cognition R41.89    Lumbar radiculopathy, chronic M54.16    Abdominal spasms R10.9    Sessile colonic polyp K63.5    Shortness of breath R06.02    Chest pain at rest R07.9       Isolation/Infection:   Isolation          No Isolation        Patient Infection Status     Infection Onset Added Last Indicated Last Indicated By Review Planned Expiration Resolved Resolved By    None active    Resolved    COVID-19 Rule Out 07/01/20 07/01/20 07/01/20 COVID-19 (Ordered)   07/01/20 Rule-Out Test Resulted          Nurse Assessment:  Last Vital Signs: BP (!) 105/46   Pulse 74   Temp 97.9 °F (36.6 °C) (Oral)   Resp 18   Ht 5' 2\" (1.575 m)   Wt 256 lb 9.9 oz (116.4 kg)   LMP  (LMP Unknown)   SpO2 97%   BMI 46.94 kg/m²     Last documented pain score (0-10 scale): Pain Level: 0  Last Weight:   Wt Readings from Last 1 Encounters:   07/01/20 256 lb 9.9 oz (116.4 kg)     Mental Status:  {IP PT MENTAL STATUS:25374}    IV Access:  {[facility] IV ACCESS:544133593}    Nursing Mobility/ADLs:  Walking   {CHP DME ZMOS:755305571}  Transfer  {CHP DME FPTU:605382972}  Bathing  {CHP DME QXLZ:337202515}  Dressing  {CHP DME ZOHX:106192054}  Toileting  {CHP DME QMHW:048058273}  Feeding  {CHP DME FCPM:307000615}  Med Admin  {CHP DME VCVP:617733373}  Med Delivery   { MAY MED Delivery:346105833}    Wound Care Documentation and Therapy:        Elimination:  Continence: · Bowel: {YES / YN:55134}  · Bladder: {YES / GR:62975}  Urinary Catheter: {Urinary Catheter:707410971}   Colostomy/Ileostomy/Ileal Conduit: {YES / }       Date of Last BM: ***    Intake/Output Summary (Last 24 hours) at 2020 1100  Last data filed at 2020 0630  Gross per 24 hour   Intake 600 ml   Output 800 ml   Net -200 ml     I/O last 3 completed shifts:   In: 1100 [P.O.:1100]  Out: 800 [Urine:800]    Safety Concerns:     [employer] Safety Concerns:098678780}    Impairments/Disabilities:      [employer] Impairments/Disabilities:409663768}    Nutrition Therapy:  Current Nutrition Therapy:   [employer] Diet List:749184149}    Routes of Feeding: {CHP DME Other Feedings:331500380}  Liquids: {Slp liquid thickness:36299}  Daily Fluid Restriction: {CHP DME Yes amt example:894206860}  Last Modified Barium Swallow with Video (Video Swallowing Test): {Done Not Done IXNS:542923076}    Treatments at the Time of Hospital Discharge:   Respiratory Treatments: ***  Oxygen Therapy:  {Therapy; copd oxygen:12252}  Ventilator:    { CC Vent ZCTT:307667043}    Rehab Therapies: {THERAPEUTIC INTERVENTION:4832176079}  Weight Bearing Status/Restrictions: [employer]  Weight Bearin}  Other Medical Equipment (for information only, NOT a DME order):  {EQUIPMENT:138617932}  Other Treatments: ***    Patient's personal belongings (please select all that are sent with patient):  {CHP DME Belongings:853139779}    RN SIGNATURE:  {Esignature:011045015}    CASE MANAGEMENT/SOCIAL WORK SECTION    Inpatient Status Date: 20  Readmission Risk Assessment Score:  Readmission Risk              Risk of Unplanned Readmission:        30           Discharging to Facility/ Agency   Name:   [facility], [facility] [address]       Phone: [phone]       Fax: [phone]        ·   · Address:  · Phone:  · Fax:    Dialysis Facility (if applicable)   · Name:  · Address:  · Dialysis Schedule:  · Phone:  · Fax:    / signature: Electronically signed by Aby Chang RN on 7/2/20 at 11:00 AM EDT    PHYSICIAN SECTION    Prognosis: Fair    Condition at Discharge: Stable    Rehab Potential (if transferring to Rehab): Fair    Recommended Labs or Other Treatments After Discharge: PT , OT , weight loss goal , dietary education ,     Physician Certification: I certify the above information and transfer of [place] [facility]  is necessary for the continuing treatment of the diagnosis listed and that she requires Home Care for greater 30 days.      Update Admission H&P: No change in H&P    PHYSICIAN SIGNATURE:  Electronically signed by Alycia Daniel MD on 7/2/20 at 10:35 AM EDT

## 2020-07-02 NOTE — PLAN OF CARE
Problem: Falls - Risk of:  Goal: Will remain free from falls  Description: Will remain free from falls  7/2/2020 0945 by Evelyne Garnett RN  Outcome: Ongoing     Problem: Falls - Risk of:  Goal: Absence of physical injury  Description: Absence of physical injury  7/2/2020 0945 by Evelyne Garnett RN  Outcome: Ongoing     Problem: Pain:  Goal: Control of acute pain  Description: Control of acute pain  7/2/2020 0945 by Evelyne Garnett RN  Outcome: Ongoing     Problem: Skin Integrity:  Goal: Will show no infection signs and symptoms  Description: Will show no infection signs and symptoms  7/2/2020 0945 by Evelyne Garnett RN  Outcome: Ongoing     Problem: Skin Integrity:  Goal: Absence of new skin breakdown  Description: Absence of new skin breakdown  7/2/2020 0945 by Evelyne Garnett RN  Outcome: Ongoing

## 2020-07-02 NOTE — PROGRESS NOTES
[facility]      Daily Progress Note     Admit Date: 6/30/2020  Bed/Room No.  3014/3014-01  Admitting Physician : Shekhar Lake MD  Code Status :[address] Day:  LOS: 1 day   Chief Complaint:     Chief Complaint   Patient presents with    Chest Pain     Principal Problem:    Chest pain at rest  Active Problems:    Type 2 diabetes mellitus with diabetic polyneuropathy, with long-term current use of insulin (HCC)    Obesity, morbid, BMI 40.0-49.9 ([facility]    Stage 3 chronic kidney disease ([facility]    Benign hypertension with CKD (chronic kidney disease) stage III (HCC)    Shortness of breath  Resolved Problems:    * No resolved hospital problems. *    Subjective : Interval History/Significant events :  07/02/20    Patient edison chest pain-free. She denies any fever, cough, nausea, vomiting. Patient is eating and drinking okay. Vitals - Stable afebrile  Labs -COVID test negative. Nursing notes , Consults notes reviewed. Overnight events and updates discussed with Nursing staff . Background History:         Alayna Singh is 78 y.o. female  Who was admitted to the hospital on 6/30/2020 for treatment of Chest pain at rest. Patient was brought to emergency room by EMS after she had acute onset substernal chest pain. Patient reported that pain was radiated to her right jaw. She denied any diaphoresis, palpitation. Patient did report mild difficulty breathing. She has underlying history of paroxysmal atrial fibrillation, obesity, COPD, CKD, chronic degenerative disc disease, gastritis. She is former smoker and has quit more than 20 years before. Patient denies alcohol use, substance abuse. She has underlying history of nonobstructive CAD. Patient had cardiac cath in year 2012 that showed 60% first diagonal branch LAD. Patient had recent cardiac stress test 4 months before. Evaluation emergency room showed temperature 99.6, stable blood pressure.   Lab evaluation showed normal electrolytes, kidney function, troponin negative. Patient had normal hemoglobin, white count. Chest x-ray was negative for acute airspace consolidation. COVID-19 was suspected due to history of cough for 1 month and is currently in [facility] plus isolation. PMH:  Past Medical History:   Diagnosis Date    Allergic rhinitis     Anxiety 7/17/2013    Arthritis     Asthma     Atrial fibrillation ([facility])     Back pain     NERVE/DR. GRACIA    Benign hypertension with CKD (chronic kidney disease) stage III ([facility])     CAD (coronary artery disease) 3/21/2013    Caffeine use     2 coffee/day    CHF (congestive heart failure) ([facility])     Chronic kidney disease     CKD (chronic kidney disease) stage 3, GFR 30-59 ml/min ([facility])     COPD (chronic obstructive pulmonary disease) ([facility])     emphysema    Degeneration of lumbar or lumbosacral intervertebral disc     Depression     DM (diabetes mellitus) ([facility])     Emphysema of lung ([facility])     Gastritis     GERD (gastroesophageal reflux disease)     Hearing loss     Hematuria     Hiatal hernia     HTN (hypertension), benign 6/28/2014    Hypertriglyceridemia     Incontinence of urine 9/25/2013    Knee arthropathy     Lumbosacral spondylosis without myelopathy 9/29/2014    Migraine headache     DR. GRACIA    Mumps     Neuropathy     Obesity     On home oxygen therapy     2 L PER NC  HS AND PRN    HIGINIO on CPAP     Otitis media 1-26-15    Secondary diabetes mellitus with stage 3 chronic kidney disease (GFR 30-59) ([facility])     Stroke ([facility])     QUESTIONABLE    Tinnitus     Type 2 diabetes mellitus without complication ([facility])     Type II or unspecified type diabetes mellitus without mention of complication, not stated as uncontrolled     UTI (lower urinary tract infection)     Varicose vein       Allergies:    Allergies   Allergen Reactions    Robitussin [Guaifenesin] Anaphylaxis    Codeine Swelling    Compazine [Prochlorperazine Maleate] Hives and Swelling    Iodides Itching    Morphine Other (See Comments)     hallucinations    Moxifloxacin      Other reaction(s): Intolerance-unknown  Other reaction(s): Intolerance-unknown    Reglan [Metoclopramide] Nausea Only    Avelox [Moxifloxacin Hcl In Nacl] Rash     Dermatitis      Cipro Xr Rash     dermatitis    Sulfa Antibiotics Rash      Medications :  atorvastatin, 80 mg, Oral, Daily  carvedilol, 3.125 mg, Oral, BID WC  citalopram, 20 mg, Oral, BID  clopidogrel, 75 mg, Oral, Daily  docusate sodium, 100 mg, Oral, BID  ferrous sulfate, 325 mg, Oral, Daily with breakfast  gabapentin, 300 mg, Oral, TID  losartan, 25 mg, Oral, Daily  magnesium oxide, 400 mg, Oral, BID  pantoprazole, 40 mg, Oral, BID  budesonide-formoterol, 2 puff, Inhalation, Daily  topiramate, 100 mg, Oral, Nightly  insulin lispro, 0-12 Units, Subcutaneous, TID WC  insulin lispro, 0-6 Units, Subcutaneous, Nightly  sodium chloride flush, 10 mL, Intravenous, 2 times per day  aspirin, 325 mg, Oral, Daily  enoxaparin, 30 mg, Subcutaneous, BID  insulin glargine, 25 Units, Subcutaneous, BID        Review of Systems   Review of Systems   Constitutional: Negative for appetite change, fatigue, fever and unexpected weight change. HENT: Negative for congestion, rhinorrhea and sneezing. Eyes: Negative for visual disturbance. Respiratory: Negative for cough, chest tightness, shortness of breath and wheezing. Cardiovascular: Negative for chest pain and palpitations. Gastrointestinal: Negative for abdominal pain, blood in stool, constipation, diarrhea, nausea and vomiting. Genitourinary: Negative for dysuria, enuresis, frequency and hematuria. Musculoskeletal: Negative for arthralgias and myalgias. Skin: Negative for rash. Neurological: Negative for dizziness, weakness, light-headedness and headaches. Hematological: Does not bruise/bleed easily. Psychiatric/Behavioral: Negative for dysphoric mood and sleep disturbance. Objective :      Current Vitals : Temp: 97.9 °F (36.6 °C),  Pulse: 74, Resp: 16, BP: (!) 105/46, SpO2: 98 %  Last 24 Hrs Vitals   Patient Vitals for the past 24 hrs:   BP Temp Temp src Pulse Resp SpO2   07/02/20 0451 (!) 105/46 97.9 °F (36.6 °C) Oral 74 16 98 %   07/01/20 2320 (!) 104/52 98.5 °F (36.9 °C) Oral 69 15 95 %   07/01/20 1705 125/78 -- -- 73 -- --   07/01/20 1400 (!) 139/53 -- -- 67 20 99 %   07/01/20 0932 -- -- -- -- 19 98 %   07/01/20 0900 (!) 102/46 98.2 °F (36.8 °C) Oral 69 19 96 %     Intake / output   07/01 0701 - 07/02 0700  In: 1100 [P.O.:1100]  Out: 800 [Urine:800]  Physical Exam:  Physical Exam  Vitals signs and nursing note reviewed. Constitutional:       General: She is not in acute distress. Appearance: She is not diaphoretic. HENT:      Head: Normocephalic and atraumatic. Nose:      Right Sinus: No maxillary sinus tenderness or frontal sinus tenderness. Left Sinus: No maxillary sinus tenderness or frontal sinus tenderness. Mouth/Throat:      Pharynx: No oropharyngeal exudate. Eyes:      General: No scleral icterus. Conjunctiva/sclera: Conjunctivae normal.      Pupils: Pupils are equal, round, and reactive to light. Neck:      Musculoskeletal: Full passive range of motion without pain and neck supple. Thyroid: No thyromegaly. Vascular: No JVD. Cardiovascular:      Rate and Rhythm: Normal rate and regular rhythm. Pulses:           Dorsalis pedis pulses are 2+ on the right side and 2+ on the left side. Heart sounds: Normal heart sounds. No murmur. Pulmonary:      Effort: Pulmonary effort is normal.      Breath sounds: Normal breath sounds. No wheezing or rales. Abdominal:      Palpations: Abdomen is soft. There is no mass. Tenderness: There is no abdominal tenderness. Lymphadenopathy:      Head:      Right side of head: No submandibular adenopathy. Left side of head: No submandibular adenopathy.       Cervical: No cervical adenopathy. Skin:     General: Skin is warm. Neurological:      Mental Status: She is alert and oriented to person, place, and time. Motor: No tremor. Psychiatric:         Behavior: Behavior is cooperative. Laboratory findings:    Recent Labs     06/30/20 1946 07/02/20  0456   WBC 8.8 6.1   HGB 14.2 14.3   HCT 45.7 47.3*    165     Recent Labs     06/30/20 1946 07/02/20  0456    140   K 4.5 4.7   CL 99 101   CO2 25 31   GLUCOSE 69* 193*   BUN 22 17   CREATININE 1.06* 1.02*   MG  --  1.9   CALCIUM 9.2 9.1     Recent Labs     06/30/20 1946 07/02/20  0456   PROT 6.6 6.5   LABALBU 3.8 3.8   TSH 2.14  --    AST 27 22   ALT 23 24     --    ALKPHOS 67 64   BILITOT 0.16* 0.19*   LIPASE 50  --    CHOL  --  264*   HDL  --  34*   LDLCHOLESTEROL  --  162*   CHOLHDLRATIO  --  7.8*   TRIG  --  340*   VLDL  --  NOT REPORTED*          Specific Gravity, UA   Date Value Ref Range Status   09/14/2019 1.025 1.005 - 1.030 Final     Protein, UA   Date Value Ref Range Status   09/14/2019 NEGATIVE NEGATIVE Final     RBC, UA   Date Value Ref Range Status   09/14/2019 None 0 - 4 /HPF Final     Comment:     Reference range defined for non-centrifuged specimen. Blood, UA POC   Date Value Ref Range Status   08/17/2018 trace  Final     Bacteria, UA   Date Value Ref Range Status   09/14/2019 NOT REPORTED None Final     Nitrite, Urine   Date Value Ref Range Status   09/14/2019 NEGATIVE NEGATIVE Final     WBC, UA   Date Value Ref Range Status   09/14/2019 2 TO 5 0 - 5 /HPF Final     Leukocyte Esterase, Urine   Date Value Ref Range Status   09/14/2019 NEGATIVE NEGATIVE Final       Imaging / Clinical Data :-   Xr Chest Standard (2 Vw)    Result Date: 6/30/2020  No acute focal airspace consolidation. Stable mild cardiomegaly. Clinical Course : stable  Assessment and Plan  :         Chest pain at rest-acute coronary syndrome ruled out with negative troponin. Stress test 4 months ago negative. Continue medical management. Follow-up with primary cardiologist Dr. Rosendo Duarte -   Chronic cough secondary to chronic bronchitis-  Suspected COVID-19-ruled out with negative RT SARS-CoV-2 PCR. Remove droplet plus isolation. Essential hypertension-stable  Chronic kidney disease stage III  Essential hypertension -     Discharge home  Continue to monitor vitals , Intake / output ,  Cell count , HGB , Kidney function, oxygenation  as indicated . Plan and updates discussed with patient ,  answers  explained to satisfaction.    Plan discussed with Staff Sujatha Liu RN     (Please note that portions of this note were completed with a voice recognition program. Efforts were made to edit the dictations but occasionally words are mis-transcribed.)      Pop Petit MD  7/2/2020

## 2020-07-02 NOTE — DISCHARGE SUMMARY
stable blood pressure.  Lab evaluation showed normal electrolytes, kidney function, troponin negative.  Patient had normal hemoglobin, white count.  Chest x-ray was negative for acute airspace consolidation.  COVID-19 was suspected due to history of cough for 1 month and is currently in droplet plus isolation. Significant therapeutic interventions:   Chest pain at rest-acute coronary syndrome ruled out with negative troponin.  Stress test 4 months ago negative.  Continue medical management.  Follow-up with primary cardiologist Dr. Sera Scott -   Chronic cough secondary to chronic bronchitis-  Suspected COVID-19-ruled out with negative RT SARS-CoV-2 PCR. Remove droplet plus isolation.   Essential hypertension-stable  Chronic kidney disease stage III  Essential hypertension -     Significant Diagnostic Studies:   Labs / Micro:/Radiology  Recent Labs     07/02/20  0456 06/30/20  1946   WBC 6.1 8.8   HGB 14.3 14.2   HCT 47.3* 45.7   .6 99.1    213     Labs Renal Latest Ref Rng & Units 7/2/2020 6/30/2020 4/23/2020 4/22/2020 2/2/2020   BUN 8 - 23 mg/dL 17 22 23 19 15   Cr 0.50 - 0.90 mg/dL 1.02(H) 1.06(H) 1.16(H) 1.03(H) 0.95(H)   K 3.7 - 5.3 mmol/L 4.7 4.5 4.5 4.5 4.7   Na 135 - 144 mmol/L 140 137 138 132(L) 136     Lab Results   Component Value Date    ALT 24 07/02/2020    AST 22 07/02/2020    ALKPHOS 64 07/02/2020    BILITOT 0.19 (L) 07/02/2020     Lab Results   Component Value Date    TSH 2.14 06/30/2020     Lab Results   Component Value Date    HEPCAB NONREACTIVE 06/01/2017     Lab Results   Component Value Date    COLORU YELLOW 09/14/2019    NITRU NEGATIVE 09/14/2019    GLUCOSEU 3+ 09/14/2019    GLUCOSEU NEGATIVE 03/06/2012    KETUA NEGATIVE 09/14/2019    UROBILINOGEN Normal 09/14/2019    BILIRUBINUR NEGATIVE 09/14/2019    BILIRUBINUR negative 08/17/2018    BILIRUBINUR NEGATIVE  Verified by ictotest. 03/06/2012     Lab Results   Component Value Date    LABA1C 8.4 02/12/2020     Lab Results Component Value Date     10/31/2019     Lab Results   Component Value Date    INR 1.0 10/31/2019    INR 1.0 03/07/2016    INR 1.0 08/05/2015    PROTIME 10.5 10/31/2019    PROTIME 11.0 03/07/2016    PROTIME 10.3 08/05/2015       Xr Chest Standard (2 Vw)    Result Date: 6/30/2020  No acute focal airspace consolidation. Stable mild cardiomegaly. Consultations:    Consults:     Final Specialist Recommendations/Findings:   IP CONSULT TO HOSPITALIST  IP CONSULT TO HOME CARE NEEDS      The patient was seen and examined on day of discharge and this discharge summary is in conjunction with any daily progress note from day of discharge. Discharge plan:     Disposition: home with home care    Physician Follow Up:     MD Julio Lang. Jodi Olsonisraelstephanie  [address] [facility] [id number]  [address] [facility], [address] [place] [address], [phone]  [address] [address]  [phone]    Schedule an appointment as soon as possible for a visit in 2 weeks  post hospital visit to discuss stress test     Mary Urenaallum, [address] [facility] [address] [facility]  [phone]      as scheduled. Requiring Further Evaluation/Follow Up POST HOSPITALIZATION/Incidental Findings: COVID-19 test negative. Diet: diabetic diet and Low-carb diet. Activity: As tolerated.     Instructions to Patient: Diet changes and weight loss recommended    Discharge Medications:      Medication List      CONTINUE taking these medications    Adjust Bath/Shower Seat/Back Misc  Use daily for shower     aspirin 81 MG chewable tablet  Take 1 tablet by mouth daily     atorvastatin 80 MG tablet  Commonly known as:  LIPITOR  Take 1 tablet by mouth daily     B-D SINGLE USE SWABS REGULAR Pads  THREE TIMES A DAY     blood glucose monitor kit and supplies  Use daily to check BS     carvedilol 3.125 MG tablet  Commonly known as:  COREG  TAKE 1 TAB BY MOUTH TWICE A DAY ( AM AND BEDTIME )     citalopram 40 MG tablet  Commonly known as: CELEXA  TAKE 1/2  TAB BY MOUTH TWICE  A DAY     clopidogrel 75 MG tablet  Commonly known as:  PLAVIX  TAKE 1 TAB BY MOUTH ONCE A DAY ( AM ) -THIS MEDICINE MAY BE TAKENWITH OR WITHOUT FOOD     Compression Stockings Misc  by Does not apply route Pressure between 20 - 25 .     dicyclomine 10 MG capsule  Commonly known as:  BENTYL  Take 1 capsule by mouth 2 times daily as needed (abdominal spasm)     docusate sodium 100 MG capsule  Commonly known as:  Colace  Take 1 capsule by mouth 2 times daily Please use while taking Percocet     ferrous sulfate 325 (65 Fe) MG tablet  Commonly known as:  IRON 325  TAKE 1 TAB BY MOUTH EVERY MORNING     furosemide 20 MG tablet  Commonly known as:  LASIX  Take 1 tablet by mouth daily as needed (weight gain 3 lbs overnight)     gabapentin 300 MG capsule  Commonly known as:  Neurontin  Take 1 capsule by mouth 3 times daily for 30 days. insulin aspart 100 UNIT/ML injection pen  Commonly known as:  NovoLOG FlexPen  If <139 - No Insulin; 140-199-2 Units;200-249-4 Units;250-299-6 Units;300-349-8 Units;350-400=10 Units; Above 400-12 Units     insulin glargine 100 UNIT/ML injection pen  Commonly known as:  Lantus SoloStar  Inject 42 Units into the skin 2 times daily     ipratropium-albuterol 0.5-2.5 (3) MG/3ML Soln nebulizer solution  Commonly known as:  DUONEB  Inhale 3 mLs into the lungs every 4 hours     lidocaine 4 % cream  Commonly known as:  LMX  Apply topically every 8 hrs as needed for pain     * Lift Chair Misc  by Does not apply route Use daily at home to help with ADL's     * Lift Chair Misc  by Does not apply route     losartan 25 MG tablet  Commonly known as:  COZAAR  TAKE 1 TAB BY MOUTH ONCE A DAY     magnesium oxide 400 MG tablet  Commonly known as:  MAG-OX  TAKE 1 TAB BY MOUTH TWICE A DAY ( AM AND BEDTIME )     Melatonin 10 MG Tabs  Take 10 mg by mouth nightly     metFORMIN 1000 MG tablet  Commonly known as:  GLUCOPHAGE  TAKE 1 TAB BY MOUTH TWICE A DAY ( AM AND BEDTIME ) naloxone 4 MG/0.1ML Liqd nasal spray  1 spray by Nasal route as needed for Opioid Reversal     nitroGLYCERIN 0.4 MG SL tablet  Commonly known as:  NITROSTAT     nystatin 668730 UNIT/GM powder  Commonly known as:  MYCOSTATIN  Apply 3 times daily. oxyCODONE-acetaminophen 5-325 MG per tablet  Commonly known as:  Percocet  Take 1 tablet by mouth See Admin Instructions for 30 days. Intended supply: 30 days. 1 tab 3-4 times a day prn  Start taking on:  July 3, 2020     OXYGEN     pantoprazole 40 MG tablet  Commonly known as:  PROTONIX  Take 1 tablet by mouth 2 times daily     Symbicort 160-4.5 MCG/ACT Aero  Generic drug:  budesonide-formoterol     tobramycin-dexamethasone 0.3-0.1 % ophthalmic suspension  Commonly known as:  TOBRADEX     topiramate 100 MG tablet  Commonly known as:  TOPAMAX  Take 1 tablet by mouth nightly     True Metrix Blood Glucose Test strip  Generic drug:  blood glucose test strips  THREE TIMES A DAY     UltiCare Mini Pen Needles 31G X 6 MM Misc  Generic drug:  Insulin Pen Needle  USE AS DIRECTED         * This list has 2 medication(s) that are the same as other medications prescribed for you. Read the directions carefully, and ask your doctor or other care provider to review them with you. Time Spent on discharge is  33 mins in patient examination, evaluation, counseling as well as medication reconciliation, prescriptions for required medications, discharge plan and follow up. Electronically signed by   Alycia Daniel MD  7/2/2020        Thank you Dr. Brock Alonzo MD for the opportunity to be involved in this patient's care.

## 2020-07-02 NOTE — PROGRESS NOTES
Pt had transportation set up with [employer] for 1500 by case management. She was transported out with her night gown, robe, phone, purse and undergarments. Her discharge instructions were gone through and signed with this RN and left without incident.

## 2020-07-02 NOTE — TELEPHONE ENCOUNTER
Richard Mancini with [address] [address] called to inform Dr Rob Block that the patient was admitted to [address] [place] [address] on 6-30-20.

## 2020-07-02 NOTE — CARE COORDINATION
Discharge [facility] Case Management Department  Written by: Rudi River RN    Patient Name: Martha Arce  Attending Provider: Adrienne House MD  Admit Date: 2020  7:21 PM  MRN: [id number]  Account: [de-identified]                     : 1950  Discharge Date:       Disposition: home current with [facility], called office spoke to Shanae Ross about patients return home faxed avs/orders    Rudi River RN    14:23 transportation arrangements made with [employer]

## 2020-07-02 NOTE — PLAN OF CARE
Problem: Falls - Risk of:  Goal: Will remain free from falls  Description: Will remain free from falls  Outcome: Ongoing  Goal: Absence of physical injury  Description: Absence of physical injury  Outcome: Ongoing     Problem: Pain:  Goal: Control of acute pain  Description: Control of acute pain  Outcome: Ongoing     Problem: Skin Integrity:  Goal: Will show no infection signs and symptoms  Description: Will show no infection signs and symptoms  Outcome: Ongoing  Goal: Absence of new skin breakdown  Description: Absence of new skin breakdown  Outcome: Ongoing

## 2020-07-06 ENCOUNTER — TELEPHONE (OUTPATIENT)
Dept: FAMILY MEDICINE CLINIC | Age: 70
End: 2020-07-06

## 2020-07-06 NOTE — TELEPHONE ENCOUNTER
[facility] Initial Follow Up Call    Outreach made within 2 business days of discharge: Yes    Patient: Pierre Basurto Patient : 1950   MRN: [id number]  Reason for Admission: There are no discharge diagnoses documented for the most recent discharge. Discharge Date: 20       Spoke with:   Brenda Reese    Discharge department/facility: [address] [place] [address] Interactive Patient Contact:  Was patient able to fill all prescriptions: Yes  Was patient instructed to bring all medications to the follow-up visit: Yes  Is patient taking all medications as directed in the discharge summary?  Yes  Does patient understand their discharge instructions: Yes  Does patient have questions or concerns that need addressed prior to 7-14 day follow up office visit: no    Scheduled appointment with PCP within 7-14 days    Follow Up  Future Appointments   Date Time Provider Clarence Mas   2020 11:00 AM [facility] PET/CT ROOM [facility] Julio   2020  2:00 PM VERONIKA Leo - CNP [address]   2020 11:00 AM Stan Jordan MD fp pooja Santos MA     Pt will call to schedule f/u after getting testing done thursday

## 2020-07-09 ENCOUNTER — HOSPITAL ENCOUNTER (OUTPATIENT)
Dept: NUCLEAR MEDICINE | Age: 70
Discharge: HOME | End: 2020-07-11
Payer: COMMERCIAL

## 2020-07-09 LAB — GLUCOSE BLD-MCNC: 147 MG/DL (ref 65–105)

## 2020-07-09 PROCEDURE — 3430000000 HC RX DIAGNOSTIC RADIOPHARMACEUTICAL: Performed by: FAMILY MEDICINE

## 2020-07-09 PROCEDURE — 78815 PET IMAGE W/CT SKULL-THIGH: CPT

## 2020-07-09 PROCEDURE — A9552 F18 FDG: HCPCS | Performed by: FAMILY MEDICINE

## 2020-07-09 PROCEDURE — 2580000003 HC RX 258: Performed by: FAMILY MEDICINE

## 2020-07-09 PROCEDURE — 82947 ASSAY GLUCOSE BLOOD QUANT: CPT

## 2020-07-09 RX ORDER — SODIUM CHLORIDE 0.9 % (FLUSH) 0.9 %
10 SYRINGE (ML) INJECTION PRN
Status: DISCONTINUED | OUTPATIENT
Start: 2020-07-09 | End: 2020-07-12 | Stop reason: HOSPADM

## 2020-07-09 RX ORDER — FLUDEOXYGLUCOSE F 18 200 MCI/ML
10 INJECTION, SOLUTION INTRAVENOUS
Status: COMPLETED | OUTPATIENT
Start: 2020-07-09 | End: 2020-07-09

## 2020-07-09 RX ADMIN — Medication 10 ML: at 10:38

## 2020-07-09 RX ADMIN — FLUDEOXYGLUCOSE F 18 13.11 MILLICURIE: 200 INJECTION, SOLUTION INTRAVENOUS at 10:38

## 2020-07-30 ENCOUNTER — HOSPITAL ENCOUNTER (OUTPATIENT)
Dept: PAIN MANAGEMENT | Age: 70
Discharge: HOME | End: 2020-07-30
Payer: COMMERCIAL

## 2020-07-30 PROCEDURE — 99213 OFFICE O/P EST LOW 20 MIN: CPT

## 2020-07-30 PROCEDURE — 99442 PR PHYS/QHP TELEPHONE EVALUATION 11-20 MIN: CPT | Performed by: NURSE PRACTITIONER

## 2020-07-30 RX ORDER — OXYCODONE HYDROCHLORIDE AND ACETAMINOPHEN 5; 325 MG/1; MG/1
1 TABLET ORAL SEE ADMIN INSTRUCTIONS
Qty: 100 TABLET | Refills: 0 | Status: SHIPPED | OUTPATIENT
Start: 2020-08-03 | End: 2020-09-01 | Stop reason: SDUPTHER

## 2020-07-30 RX ORDER — GABAPENTIN 300 MG/1
300 CAPSULE ORAL 3 TIMES DAILY
Qty: 90 CAPSULE | Refills: 2 | Status: SHIPPED | OUTPATIENT
Start: 2020-07-30 | End: 2022-07-25

## 2020-07-30 RX ORDER — NALOXONE HYDROCHLORIDE 4 MG/.1ML
1 SPRAY NASAL PRN
Qty: 1 EACH | Refills: 0 | Status: ON HOLD | OUTPATIENT
Start: 2020-07-30 | End: 2020-11-03 | Stop reason: HOSPADM

## 2020-07-30 RX ORDER — OFLOXACIN 3 MG/ML
SOLUTION/ DROPS OPHTHALMIC
COMMUNITY
Start: 2020-07-29 | End: 2021-02-16

## 2020-07-30 ASSESSMENT — ENCOUNTER SYMPTOMS
EYES NEGATIVE: 1
BACK PAIN: 1
RESPIRATORY NEGATIVE: 1
GASTROINTESTINAL NEGATIVE: 1

## 2020-07-30 NOTE — PROGRESS NOTES
Snehal 88 PROGRESS NOTE      Patient  Phone call to  review Medication Agreement    Chief Complaint:back pain    She c/o low back pain which radiates down legs. Her pain fluctuates. PT helped some when she did it. No History of lumbar surgery,  She does home exercises . She uses oxygen with c-pap,She sleeps well. She states had recent test to check for lung cancer and she does not have it,She had one Ed visit in hospital for 2 days for chest pain. She states had covid 19 test and was negative. Back Pain   This is a chronic problem. The problem occurs constantly. The problem has been waxing and waning since onset. The pain is present in the lumbar spine. Radiates to: legs. The pain is at a severity of 7/10. The pain is moderate. The pain is worse during the day. Exacerbated by: laying on left side. Risk factors include menopause and obesity. She has tried analgesics, home exercises and heat for the symptoms. The treatment provided mild relief. Patient denies any new neurological symptoms. No bowel or bladder incontinence, no weakness, and no falling. Any new diagnostic workup: [] no  [] yes [] Xray [x] CT scan [] MRI [] DEXA scan     [] Other                    Treatment goals:  Functional status: get better    Aberrancy:   Any alcoholic beverages    no   Any illegal drugs   no      Analgesia:7    Adverse  Effects :nione  ADL;s :home exercises      Pill count: appropriate fill date 8-3-2020  Morphine equivalent dose as reported on [place]:25  Periodic Controlled Substance Monitoring: Possible medication side effects, risk of tolerance/dependence & alternative treatments discussed., No signs of potential drug abuse or diversion identified. , Assessed functional status., Obtaining appropriate analgesic effect of treatment. Steve Dinh, APRN - CNP)  Review of[place]RS does not show any aberrant prescription behavior. Medication is helping the patient stay active.  Patient denies any side effects and reports adequate analgesia. No sign of misuse/abuse. When was thelast UDS:    1-6-2020         Was the UDS appropriate:yes      Record/Diagnostics Review:      As above, I did review the imaging    1/9/2020  8:36 PM - Carmelo, Justino Incoming Lab Results From [facility]     Component  Value  Ref Range & Units  Status  Collected  Lab    Pain Management Drug Panel Interp, Urine  Consistent   Final  01/06/2020 11:40 AM  [facility]    (NOTE)   ________________________________________________________________   DRUGS EXPECTED:   PERCOCET (OXYCODONE) [1/6/20]   ________________________________________________________________   CONSISTENT with medications provided:   PERCOCET (OXYCODONE) : based on oxycodone, noroxycodone   ________________________________________________________________   Drugs Not Included in this Assay:   Acetaminophen   ________________________________________________________________   INTERPRETIVE INFORMATION: [facility] [place], [place] Spec/EMIT, Ur,                            Interp   Interpretation depends on accuracy and completeness of patient   medication information submitted by client.     6-Acetylmorphine, Ur  Not Detected   Final  01/06/2020 11:40 AM  [facility]    7-Aminoclonazepam, Urine  Not Detected   Final  01/06/2020 11:40 AM  [facility]    Alpha-OH-Alpraz, Urine  Not Detected   Final  01/06/2020 11:40 AM  [facility]    Alprazolam, Urine  Not Detected   Final  01/06/2020 11:40 AM  [facility]    Amphetamines, urine  Not Detected   Final  01/06/2020 11:40 AM  [facility]    Barbiturates, Ur  Not Detected   Final  01/06/2020 11:40 AM  [facility]    Benzoylecgonine, Ur  Not Detected   Final  01/06/2020 11:40 AM  [facility]    Buprenorphine Urine  Not Detected   Final  01/06/2020 11:40 AM  [facility]    Carisoprodol, Ur  Not Detected   Final  01/06/2020 11:40 AM  [facility]    (NOTE)            Past Medical History:   Diagnosis Date    Allergic rhinitis     Anxiety 7/17/2013    Arthritis     Asthma     Atrial fibrillation (HCC)     Back pain NERVE/DR. GRACIA    Benign hypertension with CKD (chronic kidney disease) stage III (HCC)     CAD (coronary artery disease) 3/21/2013    Caffeine use     2 coffee/day    CHF (congestive heart failure) (HCC)     Chronic kidney disease     CKD (chronic kidney disease) stage 3, GFR 30-59 ml/min (HCC)     COPD (chronic obstructive pulmonary disease) (HCC)     emphysema    Degeneration of lumbar or lumbosacral intervertebral disc     Depression     [facility] mellitus) [facility]     Emphysema of lung (HCC)     Gastritis     GERD (gastroesophageal reflux disease)     Hearing loss     Hematuria     Hiatal hernia     HTN (hypertension), benign 6/28/2014    Hypertriglyceridemia     Incontinence of urine 9/25/2013    Knee arthropathy     Lumbosacral spondylosis without myelopathy 9/29/2014    Migraine headache     DR. GRACIA    Mumps     Neuropathy     Obesity     On home oxygen therapy     2 L PER NC  HS AND PRN    HIGINIO on CPAP     Otitis media 1-26-15    Secondary diabetes mellitus with stage 3 chronic kidney disease (GFR 30-59) ([facility])     Stroke (HCC)     QUESTIONABLE    Tinnitus     Type 2 diabetes mellitus without complication ([facility])     Type II or unspecified type diabetes mellitus without mention of complication, not stated as uncontrolled     UTI (lower urinary tract infection)     Varicose vein        Past Surgical History:   Procedure Laterality Date    ABLATION OF DYSRHYTHMIC FOCUS  2000    CARPAL TUNNEL RELEASE Right     CATARACT REMOVAL WITH IMPLANT Bilateral     CHOLECYSTECTOMY  2007    COLONOSCOPY      COLONOSCOPY  04/10/2017    tubular adenomo polyp , mod. sigmoid diverticulosis, min. int. hemorrhoids    CYSTOSCOPY  9/25/2013    DILATION AND CURETTAGE  1979    EYE SURGERY      laser    INCONTINENCE SURGERY      Percutaneous nerve stimulation Dr Johanna Amaya 2013     INSERTABLE CARDIAC MONITOR  12/04/2015    [employer] REVEAL LINQ MODEL #[id number] MRI CONDTIONAL OK 3T. IMMEDIATELY POST IMPLANT    OTHER SURGICAL HISTORY  09/10/15    removal of interstim    [place] COLSC FLX W/REMOVAL LESION BY HOT BX FORCEPS N/A 4/10/2017    COLONOSCOPY POLYPECTOMY HOT BIOPSY performed by Xiomara Quiroz MD at [address] [place] [address]      TYMPANOPLASTY      TYMPANOPLASTY      TYMPANOSTOMY TUBE PLACEMENT  08/21/2017    UPPER GASTROINTESTINAL ENDOSCOPY  03/2016    Dr Reese Leavitt       Allergies   Allergen Reactions    Robitussin [Guaifenesin] Anaphylaxis    Codeine Swelling    Compazine [Prochlorperazine Maleate] Hives and Swelling    Iodides Itching    Morphine Other (See Comments)     hallucinations    Moxifloxacin      Other reaction(s): Intolerance-unknown  Other reaction(s): Intolerance-unknown    Reglan [Metoclopramide] Nausea Only    Avelox [Moxifloxacin Hcl In Nacl] Rash     Dermatitis      Cipro Xr Rash     dermatitis    Sulfa Antibiotics Rash         Current Outpatient Medications:     ofloxacin (OCUFLOX) 0.3 % solution, , Disp: , Rfl:     oxyCODONE-acetaminophen (PERCOCET) 5-325 MG per tablet, Take 1 tablet by mouth See Admin Instructions for 30 days. Intended supply: 30 days. 1 tab 3-4 times a day prn, Disp: 100 tablet, Rfl: 0    nystatin (MYCOSTATIN) 851954 UNIT/GM powder, Apply 3 times daily. , Disp: 3 Bottle, Rfl: 3    clopidogrel (PLAVIX) 75 MG tablet, TAKE 1 TAB BY MOUTH ONCE A DAY ( AM ) -THIS MEDICINE MAY BE TAKENWITH OR WITHOUT FOOD, Disp: 90 tablet, Rfl: 1    gabapentin (NEURONTIN) 300 MG capsule, Take 1 capsule by mouth 3 times daily for 30 days. , Disp: 90 capsule, Rfl: 0    topiramate (TOPAMAX) 100 MG tablet, Take 1 tablet by mouth nightly, Disp: 90 tablet, Rfl: 1    tobramycin-dexamethasone (TOBRADEX) 0.3-0.1 % ophthalmic suspension, , Disp: , Rfl:     insulin glargine (LANTUS SOLOSTAR) 100 UNIT/ML injection pen, Inject 42 Units into the skin 2 times daily, Disp: 5 pen, Rfl: 3    aspirin 81 MG chewable tablet, Take 1 tablet by mouth daily, Disp: 90 tablet, Rfl: 3    atorvastatin (LIPITOR) 80 MG tablet, Take 1 tablet by mouth daily, Disp: 90 tablet, Rfl: 3    ferrous sulfate 325 (65 Fe) MG tablet, TAKE 1 TAB BY MOUTH EVERY MORNING, Disp: 90 tablet, Rfl: 5    magnesium oxide (MAG-OX) 400 MG tablet, TAKE 1 TAB BY MOUTH TWICE A DAY ( AM AND BEDTIME ), Disp: 180 tablet, Rfl: 3    carvedilol (COREG) 3.125 MG tablet, TAKE 1 TAB BY MOUTH TWICE A DAY ( AM AND BEDTIME ), Disp: 180 tablet, Rfl: 3    metFORMIN (GLUCOPHAGE) 1000 MG tablet, TAKE 1 TAB BY MOUTH TWICE A DAY ( AM AND BEDTIME ), Disp: 180 tablet, Rfl: 3    pantoprazole (PROTONIX) 40 MG tablet, Take 1 tablet by mouth 2 times daily, Disp: 60 tablet, Rfl: 3    losartan (COZAAR) 25 MG tablet, TAKE 1 TAB BY MOUTH ONCE A DAY, Disp: 90 tablet, Rfl: 5    SYMBICORT 160-4.5 MCG/ACT AERO,  2 puffs As needed for sob, Disp: , Rfl:     dicyclomine (BENTYL) 10 MG capsule, Take 1 capsule by mouth 2 times daily as needed (abdominal spasm), Disp: 60 capsule, Rfl: 0    insulin aspart (NOVOLOG FLEXPEN) 100 UNIT/ML injection pen, If <139 - No Insulin; 140-199-2 Units;200-249-4 Units;250-299-6 Units;300-349-8 Units;350-400=10 Units; Above 400-12 Units, Disp: 5 pen, Rfl: 3    lidocaine (LMX) 4 % cream, Apply topically every 8 hrs as needed for pain, Disp: 45 g, Rfl: 3    naloxone 4 MG/0.1ML LIQD nasal spray, 1 spray by Nasal route as needed for Opioid Reversal, Disp: 1 each, Rfl: 5    Lift Chair MISC, by Does not apply route, Disp: 1 each, Rfl: 0    Misc.  Devices (ADJUST BATH/SHOWER SEAT/BACK) MISC, Use daily for shower, Disp: 1 each, Rfl: 0    ULTICARE MINI PEN NEEDLES 31G X 6 MM MISC, USE AS DIRECTED, Disp: 100 each, Rfl: 5    citalopram (CELEXA) 40 MG tablet, TAKE 1/2  TAB BY MOUTH TWICE  A DAY, Disp: 90 tablet, Rfl: 3    TRUE METRIX BLOOD GLUCOSE TEST strip, THREE TIMES A DAY, Disp: 1 each, Rfl: 3    Alcohol Swabs (B-D SINGLE USE SWABS REGULAR) PADS, THREE TIMES A DAY, Disp: 100 each, Rfl: 0   furosemide (LASIX) 20 MG tablet, Take 1 tablet by mouth daily as needed (weight gain 3 lbs overnight), Disp: 30 tablet, Rfl: 3    Lift Chair MISC, by Does not apply route Use daily at home to help with ADL's, Disp: 1 each, Rfl: 0    nitroGLYCERIN (NITROSTAT) 0.4 MG SL tablet, 1 under the tongue as needed for angina, may repeat q5mins for up three doses, Disp: , Rfl:     Blood Glucose Monitoring Suppl [facility], Use daily to check BS, Disp: 1 Device, Rfl: 0    ipratropium-albuterol (DUONEB) 0.5-2.5 (3) MG/3ML SOLN nebulizer solution, Inhale 3 mLs into the lungs every 4 hours, Disp: 360 mL, Rfl: 2    Melatonin 10 MG TABS, Take 10 mg by mouth nightly, Disp: 90 tablet, Rfl: 3    Compression Stockings MISC, by Does not apply route Pressure between 20 - 25 ., Disp: 1 each, Rfl: 0    docusate sodium (COLACE) 100 MG capsule, Take 1 capsule by mouth 2 times daily Please use while taking Percocet, Disp: 30 capsule, Rfl: 0    OXYGEN, Inhale 3 L into the lungs , Disp: , Rfl:     Family History   Problem Relation Age of Onset    Diabetes Mother     Heart Disease Mother     Stomach Cancer Father     Diabetes Maternal Grandmother         also aunts and uncles (maternal)    Emphysema Sister        Social History     Socioeconomic History    Marital status: Legally      Spouse name: Not on file    Number of children: Not on file    Years of education: Not on file    Highest education level: Not on file   Occupational History    Occupation: disabled     Employer: N/A   Social Needs    Financial resource strain: Not on file    Food insecurity     Worry: Not on file     Inability: Not on file   [employer] [address] needs     Medical: Not on file     Non-medical: Not on file   Tobacco Use    Smoking status: Former Smoker     Packs/day: 3.00     Years: 41.00     Pack years: 123.00     Types: Cigarettes     Last attempt to quit: 2000     Years since quittin.5    Smokeless tobacco: Never Used    Tobacco comment: quit in 2000   Substance and Sexual Activity    Alcohol use: No     Alcohol/week: 0.0 standard drinks    Drug use: No    Sexual activity: Not Currently   Lifestyle    Physical activity     Days per week: Not on file     Minutes per session: Not on file    Stress: Not on file   Relationships    Social connections     Talks on phone: Not on file     Gets together: Not on file     Attends [employer] service: Not on file     Active member of club or organization: Not on file     Attends meetings of clubs or organizations: Not on file     Relationship status: Not on file    Intimate partner violence     Fear of current or ex partner: Not on file     Emotionally abused: Not on file     Physically abused: Not on file     Forced sexual activity: Not on file   Other Topics Concern    Not on file   Social History Narrative    Not on file         Review of Systems:  Review of Systems   Constitution: Negative. HENT: Positive for hearing loss. Hearing aids   Eyes: Negative. Cardiovascular: Positive for dyspnea on exertion. Respiratory: Negative. Sleep apnea, uses cpap and oxygen   Endocrine:        Diabetic   Hematologic/Lymphatic: Bruises/bleeds easily. Skin: Negative. Musculoskeletal: Positive for back pain and joint pain. Gastrointestinal: Negative. Genitourinary: Positive for nocturia. Neurological:        Walker, scooter   Psychiatric/Behavioral: Positive for depression. Comes and goes, managing         Physical Exam:  LMP  (LMP Unknown)     Physical Exam  Skin:         Neurological:      Mental Status: She is alert. Psychiatric:         Mood and Affect: Mood normal.         Thought Content:  Thought content normal.           Assessment:    Problem List Items Addressed This Visit     Sacroiliitis, not elsewhere classified ([facility] [address] (Chronic)    Osteoarthritis of cervical spine (Chronic)    Medication monitoring encounter (Chronic)    Lumbosacral spondylosis without myelopathy (Chronic)    DDD (degenerative disc disease), lumbar    DDD (degenerative disc disease), cervical - Primary (Chronic)    Chronic, continuous use of opioids            Treatment Plan:  DISCUSSION: Treatment options discussed withpatient and all questions answered to patient's satisfaction. Possible side effects, risk of tolerance and or dependence and alternative treatments discussed    Obtaining appropriate analgesic effect of treatment   No signs of potential drug abuse or diversion identified    [x] Ill effects of being on chronic pain medications such as sleep disturbances, respiratory depression, hormonal changes, withdrawal symptoms, chronic opioid dependence and tolerance as well as risk of taking opioids with Benzodiazepines and taking opioids along with alcohol,  werediscussed with patient. I had asked the patient to minimize medication use and utilize pain medications only for uncontrolled rest pain or pain with exertional activities. I advised patient not to self-escalate painmedications without consulting with us. At each of patient's future visits we will try to taper pain medications, while adjusting the adjunct medications, and re-evaluating for Physical Therapy to improve spinal andjoint strength. We will continue to have discussions to decrease pain medications as tolerated. Counseled patient on effects their pain medication and /or their medical condition mayhave on their  ability to drive or operate machinery. Instructed not to drive or operate machinery if drowsy     I also discussed with the patient regarding the dangers of combining narcotic pain medication with tranquilizers, alcohol or illegal drugs or taking the medication any way other than prescribed. The dangers were discussed  including respiratory depression and death. Patient was told to tell  all  physicians regarding the medications he is getting from pain clinic.  Patient is warned not to take any unprescribed medications over-the-countermedications that can depress breathing . Patient is advised to talk to the pharmacist or physicians if planning to take any over-the-counter medications before  takeing them. Patient is strongly advised to avoid tranquilizers or  relaxants, illegal drugs  or any medications that can depress breathing  Patient is also advised to tell us if there is any changes in their medications from other physicians. Location:  patient  at   home  ,provider working from home  Phone call: 15 minutes  1.  Will order narcan as she is on opioids and has copd and uses oxygen prn, as she never got prior script filled    TREATMENT OPTIONS:       Medication Agreement Requirements Met  Continue Opioid therapy  Script written for percocet, gabapentin, narcan  Follow up appointment made

## 2020-08-14 RX ORDER — PEN NEEDLE, DIABETIC 29 G X1/2"
NEEDLE, DISPOSABLE MISCELLANEOUS
Qty: 100 EACH | Refills: 5 | Status: SHIPPED | OUTPATIENT
Start: 2020-08-14 | End: 2022-06-22 | Stop reason: SDUPTHER

## 2020-08-24 ENCOUNTER — OFFICE VISIT (OUTPATIENT)
Dept: PODIATRY | Age: 70
End: 2020-08-24
Payer: COMMERCIAL

## 2020-08-24 VITALS — BODY MASS INDEX: 46.19 KG/M2 | WEIGHT: 251 LBS | HEIGHT: 62 IN

## 2020-08-24 PROCEDURE — 99999 PR OFFICE/OUTPT VISIT,PROCEDURE ONLY: CPT | Performed by: PODIATRIST

## 2020-08-24 PROCEDURE — 11721 DEBRIDE NAIL 6 OR MORE: CPT | Performed by: PODIATRIST

## 2020-08-24 RX ORDER — ISOSORBIDE DINITRATE 30 MG/1
30 TABLET ORAL
COMMUNITY
End: 2021-11-24

## 2020-08-24 RX ORDER — UBIDECARENONE 75 MG
50 CAPSULE ORAL DAILY
COMMUNITY
End: 2021-11-24

## 2020-08-24 ASSESSMENT — ENCOUNTER SYMPTOMS
SHORTNESS OF BREATH: 0
DIARRHEA: 0
COLOR CHANGE: 0
NAUSEA: 0
BACK PAIN: 0

## 2020-09-01 ENCOUNTER — HOSPITAL ENCOUNTER (OUTPATIENT)
Dept: PAIN MANAGEMENT | Age: 70
Discharge: HOME | End: 2020-09-01
Payer: COMMERCIAL

## 2020-09-01 PROCEDURE — 99441 PR PHYS/QHP TELEPHONE EVALUATION 5-10 MIN: CPT | Performed by: NURSE PRACTITIONER

## 2020-09-01 PROCEDURE — 99213 OFFICE O/P EST LOW 20 MIN: CPT

## 2020-09-01 RX ORDER — OXYCODONE HYDROCHLORIDE AND ACETAMINOPHEN 5; 325 MG/1; MG/1
1 TABLET ORAL SEE ADMIN INSTRUCTIONS
Qty: 100 TABLET | Refills: 0 | Status: SHIPPED | OUTPATIENT
Start: 2020-09-02 | End: 2020-10-01 | Stop reason: SDUPTHER

## 2020-09-01 ASSESSMENT — ENCOUNTER SYMPTOMS
BACK PAIN: 1
SHORTNESS OF BREATH: 0
COUGH: 0
CONSTIPATION: 0

## 2020-09-01 NOTE — PROGRESS NOTES
Patient completed a telephone visit today to review medication contract. Chief Complaint: back pain    PMH  Pt c/o low back pain that radiates down left leg. She has never had a lumbar surgery. She is dependant on her wheelchair to get around - can walk very short distances. She had LESI  9/13/19 with mild relief. She had a TIA on 10/31/19 and was in rehab facility for a few weeks but is now home and continues with PT in her home.        Back Pain   This is a chronic problem. The current episode started more than 1 year ago. The problem occurs constantly. The problem is unchanged. The pain is present in the lumbar spine. The quality of the pain is described as aching and shooting. Radiates to: down the left leg to the foot occasionally. The pain is at a severity of 6/10. The pain is moderate. The symptoms are aggravated by position and standing (walking). Pertinent negatives include no chest pain, fever, numbness, paresthesias or tingling. She has tried heat, analgesics and bed rest for the symptoms. The treatment provided mild relief. Patient denies any new neurological symptoms. No bowel or bladder incontinence, no weakness, and no falling. Pill count: appropriate due 9/2    Morphine equivalent: 25    Periodic Controlled Substance Monitoring: Possible medication side effects, risk of tolerance/dependence & alternative treatments discussed., No signs of potential drug abuse or diversion identified., Obtaining appropriate analgesic effect of treatment. Cyril Jefferson, VERONIKA - CNP)      Past Medical History:   Diagnosis Date    Allergic rhinitis     Anxiety 7/17/2013    Arthritis     Asthma     Atrial fibrillation (HCC)     Back pain     NERVE/DR. GRACIA    Benign hypertension with CKD (chronic kidney disease) stage III (HCC)     CAD (coronary artery disease) 3/21/2013    Caffeine use     2 coffee/day    CHF (congestive heart failure) (HCC)     Chronic kidney disease     CKD (chronic kidney disease) stage 3, GFR 30-59 ml/min (HCC)     COPD (chronic obstructive pulmonary disease) (HCC)     emphysema    Degeneration of lumbar or lumbosacral intervertebral disc     Depression     DM (diabetes mellitus) (HCC)     Emphysema of lung (HCC)     Gastritis     GERD (gastroesophageal reflux disease)     Hearing loss     Hematuria     Hiatal hernia     HTN (hypertension), benign 6/28/2014    Hypertriglyceridemia     Incontinence of urine 9/25/2013    Knee arthropathy     Lumbosacral spondylosis without myelopathy 9/29/2014    Migraine headache     DR. BASIL [place]     Neuropathy     Obesity     On home oxygen therapy     2 L PER NC  HS AND PRN    HIGINIO on CPAP     Otitis media 1-26-15    Secondary diabetes mellitus with stage 3 chronic kidney disease (GFR 30-59) (HCC)     Stroke (HCC)     QUESTIONABLE    Tinnitus     Type 2 diabetes mellitus without complication ([facility])     Type II or unspecified type diabetes mellitus without mention of complication, not stated as uncontrolled     UTI (lower urinary tract infection)     Varicose vein        Past Surgical History:   Procedure Laterality Date    ABLATION OF DYSRHYTHMIC FOCUS  2000    CARPAL TUNNEL RELEASE Right     CATARACT REMOVAL WITH IMPLANT Bilateral     CHOLECYSTECTOMY  2007    COLONOSCOPY      COLONOSCOPY  04/10/2017    tubular adenomo polyp , mod. sigmoid diverticulosis, min. int. hemorrhoids    CYSTOSCOPY  9/25/2013    DILATION AND CURETTAGE  1979    EYE SURGERY      laser    INCONTINENCE SURGERY      Percutaneous nerve stimulation Dr Margy Salazar Nov 2013     INSERTABLE CARDIAC MONITOR  12/04/2015    [employer] REVEAL LINQ MODEL #[id number] MRI CONDTIONAL OK 3T.  IMMEDIATELY POST IMPLANT    OTHER SURGICAL HISTORY  09/10/15    removal of interstim    SD COLSC FLX W/REMOVAL LESION BY HOT BX FORCEPS N/A 4/10/2017    COLONOSCOPY POLYPECTOMY HOT BIOPSY performed by Karely Michelle MD at [address] [place] [address] TUBAL LIGATION      TYMPANOPLASTY      TYMPANOPLASTY      TYMPANOSTOMY TUBE PLACEMENT  08/21/2017    UPPER GASTROINTESTINAL ENDOSCOPY  03/2016    Dr Ezequiel Bojorquez       Allergies   Allergen Reactions    Robitussin [Guaifenesin] Anaphylaxis    Codeine Swelling    Compazine [Prochlorperazine Maleate] Hives and Swelling    Iodides Itching    Morphine Other (See Comments)     hallucinations    Moxifloxacin      Other reaction(s): Intolerance-unknown  Other reaction(s): Intolerance-unknown    Reglan [Metoclopramide] Nausea Only    Avelox [Moxifloxacin Hcl In Nacl] Rash     Dermatitis      Cipro Xr Rash     dermatitis    Sulfa Antibiotics Rash         Current Outpatient Medications:     vitamin B-12 (CYANOCOBALAMIN) 100 MCG tablet, Take 50 mcg by mouth daily, Disp: , Rfl:     insulin lispro (HUMALOG) 100 UNIT/ML injection vial, Inject into the skin 3 times daily (before meals), Disp: , Rfl:     isosorbide dinitrate (ISORDIL) 30 MG tablet, Take 30 mg by mouth, Disp: , Rfl:     ULTICARE MINI PEN NEEDLES 31G X 6 MM MISC, USE AS DIRECTED , Disp: 100 each, Rfl: 5    metFORMIN (GLUCOPHAGE) 1000 MG tablet, TAKE 1 TAB BY MOUTH TWICE A DAY ( IN THE MORNING AND BEDTIME ) , Disp: 180 tablet, Rfl: 3    ofloxacin (OCUFLOX) 0.3 % solution, , Disp: , Rfl:     oxyCODONE-acetaminophen (PERCOCET) 5-325 MG per tablet, Take 1 tablet by mouth See Admin Instructions for 30 days. Intended supply: 30 days. 1 tab 3-4 times a day prn, Disp: 100 tablet, Rfl: 0    gabapentin (NEURONTIN) 300 MG capsule, Take 1 capsule by mouth 3 times daily for 30 days. , Disp: 90 capsule, Rfl: 2    naloxone 4 MG/0.1ML LIQD nasal spray, 1 spray by Nasal route as needed for Opioid Reversal, Disp: 1 each, Rfl: 0    nystatin (MYCOSTATIN) 963802 UNIT/GM powder, Apply 3 times daily. , Disp: 3 Bottle, Rfl: 3    dicyclomine (BENTYL) 10 MG capsule, Take 1 capsule by mouth 2 times daily as needed (abdominal spasm), Disp: 60 capsule, Rfl: 0    clopidogrel (PLAVIX) 75 MG tablet, TAKE 1 TAB BY MOUTH ONCE A DAY ( AM ) -THIS MEDICINE MAY BE TAKENWITH OR WITHOUT FOOD, Disp: 90 tablet, Rfl: 1    topiramate (TOPAMAX) 100 MG tablet, Take 1 tablet by mouth nightly, Disp: 90 tablet, Rfl: 1    tobramycin-dexamethasone (TOBRADEX) 0.3-0.1 % ophthalmic suspension, , Disp: , Rfl:     insulin aspart (NOVOLOG FLEXPEN) 100 UNIT/ML injection pen, If <139 - No Insulin; 140-199-2 Units;200-249-4 Units;250-299-6 Units;300-349-8 Units;350-400=10 Units; Above 400-12 Units, Disp: 5 pen, Rfl: 3    insulin glargine (LANTUS SOLOSTAR) 100 UNIT/ML injection pen, Inject 42 Units into the skin 2 times daily, Disp: 5 pen, Rfl: 3    aspirin 81 MG chewable tablet, Take 1 tablet by mouth daily, Disp: 90 tablet, Rfl: 3    atorvastatin (LIPITOR) 80 MG tablet, Take 1 tablet by mouth daily, Disp: 90 tablet, Rfl: 3    lidocaine (LMX) 4 % cream, Apply topically every 8 hrs as needed for pain, Disp: 45 g, Rfl: 3    Lift Chair MISC, by Does not apply route, Disp: 1 each, Rfl: 0    Misc.  Devices (ADJUST BATH/SHOWER SEAT/BACK) MISC, Use daily for shower, Disp: 1 each, Rfl: 0    ferrous sulfate 325 (65 Fe) MG tablet, TAKE 1 TAB BY MOUTH EVERY MORNING, Disp: 90 tablet, Rfl: 5    magnesium oxide (MAG-OX) 400 MG tablet, TAKE 1 TAB BY MOUTH TWICE A DAY ( AM AND BEDTIME ), Disp: 180 tablet, Rfl: 3    carvedilol (COREG) 3.125 MG tablet, TAKE 1 TAB BY MOUTH TWICE A DAY ( AM AND BEDTIME ), Disp: 180 tablet, Rfl: 3    citalopram (CELEXA) 40 MG tablet, TAKE 1/2  TAB BY MOUTH TWICE  A DAY, Disp: 90 tablet, Rfl: 3    pantoprazole (PROTONIX) 40 MG tablet, Take 1 tablet by mouth 2 times daily, Disp: 60 tablet, Rfl: 3    TRUE METRIX BLOOD GLUCOSE TEST strip, THREE TIMES A DAY, Disp: 1 each, Rfl: 3    Alcohol Swabs (B-D SINGLE USE SWABS REGULAR) PADS, THREE TIMES A DAY, Disp: 100 each, Rfl: 0    furosemide (LASIX) 20 MG tablet, Take 1 tablet by mouth daily as needed (weight gain 3 lbs overnight), Disp: 30 tablet, Rfl: 3    losartan (COZAAR) 25 MG tablet, TAKE 1 TAB BY MOUTH ONCE A DAY, Disp: 90 tablet, Rfl: 5    Lift Chair MISC, by Does not apply route Use daily at home to help with ADL's, Disp: 1 each, Rfl: 0    nitroGLYCERIN (NITROSTAT) 0.4 MG SL tablet, 1 under the tongue as needed for angina, may repeat q5mins for up three doses, Disp: , Rfl:     Blood Glucose Monitoring Suppl [facility], Use daily to check BS, Disp: 1 Device, Rfl: 0    ipratropium-albuterol (DUONEB) 0.5-2.5 (3) MG/3ML SOLN nebulizer solution, Inhale 3 mLs into the lungs every 4 hours, Disp: 360 mL, Rfl: 2    Melatonin 10 MG TABS, Take 10 mg by mouth nightly, Disp: 90 tablet, Rfl: 3    Compression Stockings MISC, by Does not apply route Pressure between 20 - 25 ., Disp: 1 each, Rfl: 0    docusate sodium (COLACE) 100 MG capsule, Take 1 capsule by mouth 2 times daily Please use while taking Percocet, Disp: 30 capsule, Rfl: 0    SYMBICORT 160-4.5 MCG/ACT AERO,  2 puffs As needed for sob, Disp: , Rfl:     OXYGEN, Inhale 3 L into the lungs , Disp: , Rfl:     Family History   Problem Relation Age of Onset    Diabetes Mother     Heart Disease Mother     Stomach Cancer Father     Diabetes Maternal Grandmother         also aunts and uncles (maternal)    Emphysema Sister        Social History     Socioeconomic History    Marital status: Legally      Spouse name: Not on file    Number of children: Not on file    Years of education: Not on file    Highest education level: Not on file   Occupational History    Occupation: disabled     Employer: N/A   Social Needs    Financial resource strain: Not on file    Food insecurity     Worry: Not on file     Inability: Not on file   [employer] needs     Medical: Not on file     Non-medical: Not on file   Tobacco Use    Smoking status: Former Smoker     Packs/day: 3.00     Years: 41.00     Pack years: 123.00     Types: Cigarettes     Last attempt to quit: 2000     Years since quittin.6 per tablet (Start on 9/2/2020)    Sacroiliitis, not elsewhere classified ([facility] [address] (Chronic)    Relevant Medications    oxyCODONE-acetaminophen (PERCOCET) 5-325 MG per tablet (Start on 9/2/2020)    Lumbar radiculopathy, chronic    Relevant Medications    oxyCODONE-acetaminophen (PERCOCET) 5-325 MG per tablet (Start on 9/2/2020)             Treatment Plan:  Patient relates current medications are helping the pain. Patient reports taking pain medications as prescribed, denies obtaining medications from different sources and denies use of illegal drugs. Patient denies side effects from medications like nausea, vomiting, constipation or drowsiness. Patient reports current activities of daily living are possible due to medications and would like to continue them. As always, we encourage daily stretching and strengthening exercises, and recommend minimizing use of pain medications unless patient cannot get through daily activities due to pain. Contract requirements met. Continue opioid therapy. Script written for percocet  Follow up appointment made for 4 weeks    Lefty Telles is a 78 y.o. female being evaluated by a Virtual Visit (telephone visit) encounter to address concerns as mentioned above. A caregiver was present when appropriate. Due to this being a TeleHealth encounter (During ANV-42 public health emergency), evaluation of the following organ systems was limited: Vitals/Constitutional/EENT/Resp/CV/GI//MS/Neuro/Skin/Heme-Lymph-Imm. Pursuant to the emergency declaration under the [facility], [address] [place] [address] authority and the "[employer] and [employer] Act, this Virtual Visit was conducted with patient's (and/or legal guardian's) consent, to reduce the patient's risk of exposure to COVID-19 and provide necessary medical care.   The patient (and/or legal guardian) has also been advised to contact this office for worsening

## 2020-09-14 ENCOUNTER — OFFICE VISIT (OUTPATIENT)
Dept: FAMILY MEDICINE CLINIC | Age: 70
End: 2020-09-14
Payer: COMMERCIAL

## 2020-09-14 VITALS
SYSTOLIC BLOOD PRESSURE: 130 MMHG | WEIGHT: 250.6 LBS | OXYGEN SATURATION: 90 % | HEART RATE: 77 BPM | DIASTOLIC BLOOD PRESSURE: 86 MMHG | HEIGHT: 62 IN | TEMPERATURE: 96.9 F | BODY MASS INDEX: 46.12 KG/M2

## 2020-09-14 PROBLEM — R06.02 SHORTNESS OF BREATH: Status: RESOLVED | Noted: 2020-07-01 | Resolved: 2020-09-14

## 2020-09-14 LAB — HBA1C MFR BLD: 10.6 %

## 2020-09-14 PROCEDURE — 83036 HEMOGLOBIN GLYCOSYLATED A1C: CPT | Performed by: FAMILY MEDICINE

## 2020-09-14 PROCEDURE — G8399 PT W/DXA RESULTS DOCUMENT: HCPCS | Performed by: FAMILY MEDICINE

## 2020-09-14 PROCEDURE — 90694 VACC AIIV4 NO PRSRV 0.5ML IM: CPT | Performed by: FAMILY MEDICINE

## 2020-09-14 PROCEDURE — 3017F COLORECTAL CA SCREEN DOC REV: CPT | Performed by: FAMILY MEDICINE

## 2020-09-14 PROCEDURE — 1036F TOBACCO NON-USER: CPT | Performed by: FAMILY MEDICINE

## 2020-09-14 PROCEDURE — G8427 DOCREV CUR MEDS BY ELIG CLIN: HCPCS | Performed by: FAMILY MEDICINE

## 2020-09-14 PROCEDURE — 2022F DILAT RTA XM EVC RTNOPTHY: CPT | Performed by: FAMILY MEDICINE

## 2020-09-14 PROCEDURE — 4040F PNEUMOC VAC/ADMIN/RCVD: CPT | Performed by: FAMILY MEDICINE

## 2020-09-14 PROCEDURE — 1090F PRES/ABSN URINE INCON ASSESS: CPT | Performed by: FAMILY MEDICINE

## 2020-09-14 PROCEDURE — 1123F ACP DISCUSS/DSCN MKR DOCD: CPT | Performed by: FAMILY MEDICINE

## 2020-09-14 PROCEDURE — G0008 ADMIN INFLUENZA VIRUS VAC: HCPCS | Performed by: FAMILY MEDICINE

## 2020-09-14 PROCEDURE — 3046F HEMOGLOBIN A1C LEVEL >9.0%: CPT | Performed by: FAMILY MEDICINE

## 2020-09-14 PROCEDURE — 99214 OFFICE O/P EST MOD 30 MIN: CPT | Performed by: FAMILY MEDICINE

## 2020-09-14 PROCEDURE — G8417 CALC BMI ABV UP PARAM F/U: HCPCS | Performed by: FAMILY MEDICINE

## 2020-09-14 RX ORDER — ICOSAPENT ETHYL 1000 MG/1
1 CAPSULE ORAL 2 TIMES DAILY
Qty: 60 CAPSULE | Refills: 3 | Status: SHIPPED | OUTPATIENT
Start: 2020-09-14 | End: 2021-11-24

## 2020-09-14 RX ORDER — INSULIN GLARGINE 100 [IU]/ML
50 INJECTION, SOLUTION SUBCUTANEOUS 2 TIMES DAILY
Qty: 5 PEN | Refills: 3 | Status: SHIPPED | OUTPATIENT
Start: 2020-09-14 | End: 2020-10-08

## 2020-09-14 ASSESSMENT — ENCOUNTER SYMPTOMS
BACK PAIN: 1
VOMITING: 0
RHINORRHEA: 0
CONSTIPATION: 0
ABDOMINAL PAIN: 0
ABDOMINAL DISTENTION: 0
NAUSEA: 0
PHOTOPHOBIA: 0
CHEST TIGHTNESS: 0
WHEEZING: 0
FACIAL SWELLING: 0
SHORTNESS OF BREATH: 0

## 2020-09-14 NOTE — PROGRESS NOTES
Chief Complaint   Patient presents with    Diabetes     up and down readings     Results    Other     jerks when laying on side wakes her up     Fatigue         [facility]  here today for follow up on chronic medical problems, go over labs and/or diagnostic studies, and medication refills. Diabetes (up and down readings ); Results; Other (jerks when laying on side wakes her up ); and Fatigue      HPI: Patient is here for follow-up on diabetes, uncontrolled A1c has increased to 10.6, patient reports her sugars are running high. Patient does not know what she is doing likely not watching her diet. Patient is not doing her short-acting insulin more often, reports she takes metformin and long-acting insulin 45 units. Patient is in and out in the hospital for chest pain, had multiple admissions and has been evaluated multiple times. Patient reports she still gets this chest pain was seen by cardiologist and had stress test ordered done on September 8 at [facility]. which is negative. Hypertension controlled. Hyperlipidemia uncontrolled last LDL is 168 and triglycerides have also increased. Patient is on 80 mg of Lipitor. Patient has chronic lumbar spondylosis follows with pain management and is also on multiple medications, and on narcotics and muscle relaxant. 9/8/20  Stress ECG: The stress ECG was negative for ischemia.   Stress: The patient reported dizziness and shortness of breath during   the stress test.  No anginal chest pain.   Stress ECG: There were no arrhythmias during stress.   Perfusion Defect: Small size, mild to moderate perfusion defect noted   involving apex on both stress and resting nuclear images suggestive of   focal artifact and doubt any infarction given no wall motion   abnormalities.  No definite large areas of reversible perfusion defects   seen suggestive of no significant stress-induced myocardial ischemia.     Stress Function Comments: Post-stress ejection fraction is 67 %.   Perfusion Comments: Based on the perfusion study data, risk of   cardiovascular events is low risk    /86   Pulse 77   Temp 96.9 °F (36.1 °C) (Temporal)   Ht 5' 2\" (1.575 m)   Wt 250 lb 9.6 oz (113.7 kg)   LMP  (LMP Unknown)   SpO2 90%   BMI 45.84 kg/m²    Body mass index is 45.84 kg/m². Wt Readings from Last 3 Encounters:   09/14/20 250 lb 9.6 oz (113.7 kg)   08/24/20 251 lb (113.9 kg)   07/01/20 256 lb 9.9 oz (116.4 kg)        []Negative depression screening. PHQ Scores 6/25/2020 9/24/2019 3/7/2019 2/14/2018 7/15/2015   PHQ2 Score 1 1 2 0 0   PHQ9 Score 1 1 2 0 0      [x]1-4 = Minimal depression   []5-9 = Milddepression   []10-14 = Moderate depression   []15-19 = Moderately severe depression   []20-27 = Severe depression    Discussed testing with the patient and all questions fully answered.     Hospital Outpatient Visit on 07/09/2020   Component Date Value Ref Range Status    POC Glucose 07/09/2020 147* 65 - 105 mg/dL Final         Most recent labs reviewed:     Lab Results   Component Value Date    WBC 6.1 07/02/2020    HGB 14.3 07/02/2020    HCT 47.3 (H) 07/02/2020    .6 07/02/2020     07/02/2020       @BRIEFLAB(NA,K,CL,CO2,BUN,CREATININE,GLUCOSE,CALCIUM)@     Lab Results   Component Value Date    ALT 24 07/02/2020    AST 22 07/02/2020    ALKPHOS 64 07/02/2020    BILITOT 0.19 (L) 07/02/2020       Lab Results   Component Value Date    TSH 2.14 06/30/2020       Lab Results   Component Value Date    CHOL 264 (H) 07/02/2020    CHOL 193 11/01/2019    CHOL 385 (H) 09/25/2018     Lab Results   Component Value Date    TRIG 340 (H) 07/02/2020    TRIG 291 (H) 11/01/2019    TRIG 269 (H) 09/25/2018     Lab Results   Component Value Date    HDL 34 (L) 07/02/2020    HDL 37 (L) 11/01/2019    HDL 42 09/25/2018     Lab Results   Component Value Date    LDLCHOLESTEROL 162 (H) 07/02/2020    LDLCHOLESTEROL 98 11/01/2019    LDLCHOLESTEROL 289 (H) 09/25/2018     Lab Results Component Value Date    VLDL NOT REPORTED (H) 07/02/2020    VLDL NOT REPORTED (H) 11/01/2019    VLDL NOT REPORTED 09/25/2018     Lab Results   Component Value Date    CHOLHDLRATIO 7.8 (H) 07/02/2020    CHOLHDLRATIO 5.2 (H) 11/01/2019    CHOLHDLRATIO 9.2 (H) 09/25/2018       Lab Results   Component Value Date    LABA1C 10.6 09/14/2020       Lab Results   Component Value Date    UMJFVOXU94 389 10/19/2017       Lab Results   Component Value Date    FOLATE 8.3 10/19/2017       Lab Results   Component Value Date    IRON 47 (L) 02/23/2012    TIBC 377 02/23/2012    FERRITIN 91 04/22/2020       Lab Results   Component Value Date    VITD25 14.5 (L) 10/19/2017             Current Outpatient Medications   Medication Sig Dispense Refill    Tdap (ADACEL) 5-2-15.5 LF-MCG/0.5 injection Inject 0.5 mLs into the muscle once for 1 dose 0.5 mL 0    zoster recombinant adjuvanted vaccine (SHINGRIX) 50 MCG/0.5ML SUSR injection 50 MCG IM then repeat 2-6 months. 0.5 mL 1    insulin glargine (LANTUS SOLOSTAR) 100 UNIT/ML injection pen Inject 50 Units into the skin 2 times daily 5 pen 3    Icosapent Ethyl (VASCEPA) 1 g CAPS capsule Take 1 capsule by mouth 2 times daily 60 capsule 3    oxyCODONE-acetaminophen (PERCOCET) 5-325 MG per tablet Take 1 tablet by mouth See Admin Instructions for 30 days. Intended supply: 30 days.  1 tab 3-4 times a day prn 100 tablet 0    vitamin B-12 (CYANOCOBALAMIN) 100 MCG tablet Take 50 mcg by mouth daily      insulin lispro (HUMALOG) 100 UNIT/ML injection vial Inject into the skin 3 times daily (before meals)      isosorbide dinitrate (ISORDIL) 30 MG tablet Take 30 mg by mouth      ULTICARE MINI PEN NEEDLES 31G X 6 MM MISC USE AS DIRECTED  100 each 5    metFORMIN (GLUCOPHAGE) 1000 MG tablet TAKE 1 TAB BY MOUTH TWICE A DAY ( IN THE MORNING AND BEDTIME )  180 tablet 3    ofloxacin (OCUFLOX) 0.3 % solution       naloxone 4 MG/0.1ML LIQD nasal spray 1 spray by Nasal route as needed for Opioid Reversal 1 each 0    nystatin (MYCOSTATIN) 641726 UNIT/GM powder Apply 3 times daily. 3 Bottle 3    dicyclomine (BENTYL) 10 MG capsule Take 1 capsule by mouth 2 times daily as needed (abdominal spasm) 60 capsule 0    clopidogrel (PLAVIX) 75 MG tablet TAKE 1 TAB BY MOUTH ONCE A DAY ( AM ) -THIS MEDICINE MAY BE TAKENWITH OR WITHOUT FOOD 90 tablet 1    topiramate (TOPAMAX) 100 MG tablet Take 1 tablet by mouth nightly 90 tablet 1    tobramycin-dexamethasone (TOBRADEX) 0.3-0.1 % ophthalmic suspension       insulin aspart (NOVOLOG FLEXPEN) 100 UNIT/ML injection pen If <139 - No Insulin; 140-199-2 Units;200-249-4 Units;250-299-6 Units;300-349-8 Units;350-400=10 Units; Above 400-12 Units 5 pen 3    aspirin 81 MG chewable tablet Take 1 tablet by mouth daily 90 tablet 3    atorvastatin (LIPITOR) 80 MG tablet Take 1 tablet by mouth daily 90 tablet 3    lidocaine (LMX) 4 % cream Apply topically every 8 hrs as needed for pain 45 g 3    Lift Chair MISC by Does not apply route 1 each 0    Misc.  Devices (ADJUST BATH/SHOWER SEAT/BACK) MISC Use daily for shower 1 each 0    ferrous sulfate 325 (65 Fe) MG tablet TAKE 1 TAB BY MOUTH EVERY MORNING 90 tablet 5    magnesium oxide (MAG-OX) 400 MG tablet TAKE 1 TAB BY MOUTH TWICE A DAY ( AM AND BEDTIME ) 180 tablet 3    carvedilol (COREG) 3.125 MG tablet TAKE 1 TAB BY MOUTH TWICE A DAY ( AM AND BEDTIME ) 180 tablet 3    citalopram (CELEXA) 40 MG tablet TAKE 1/2  TAB BY MOUTH TWICE  A DAY 90 tablet 3    pantoprazole (PROTONIX) 40 MG tablet Take 1 tablet by mouth 2 times daily 60 tablet 3    TRUE METRIX BLOOD GLUCOSE TEST strip THREE TIMES A DAY 1 each 3    Alcohol Swabs (B-D SINGLE USE SWABS REGULAR) PADS THREE TIMES A  each 0    furosemide (LASIX) 20 MG tablet Take 1 tablet by mouth daily as needed (weight gain 3 lbs overnight) 30 tablet 3    losartan (COZAAR) 25 MG tablet TAKE 1 TAB BY MOUTH ONCE A DAY 90 tablet 5    Lift Chair MISC by Does not apply route Use daily at home to help with ADL's 1 each 0    nitroGLYCERIN (NITROSTAT) 0.4 MG SL tablet 1 under the tongue as needed for angina, may repeat q5mins for up three doses      Blood Glucose Monitoring Suppl HARMEET Use daily to check BS 1 Device 0    ipratropium-albuterol (DUONEB) 0.5-2.5 (3) MG/3ML SOLN nebulizer solution Inhale 3 mLs into the lungs every 4 hours 360 mL 2    Melatonin 10 MG TABS Take 10 mg by mouth nightly 90 tablet 3    Compression Stockings MISC by Does not apply route Pressure between 20 - 25 . 1 each 0    docusate sodium (COLACE) 100 MG capsule Take 1 capsule by mouth 2 times daily Please use while taking Percocet 30 capsule 0    SYMBICORT 160-4.5 MCG/ACT AERO   2 puffs As needed for sob      OXYGEN Inhale 3 L into the lungs       gabapentin (NEURONTIN) 300 MG capsule Take 1 capsule by mouth 3 times daily for 30 days. 90 capsule 2     No current facility-administered medications for this visit.               Social History     Socioeconomic History    Marital status: Legally      Spouse name: Not on file    Number of children: Not on file    Years of education: Not on file    Highest education level: Not on file   Occupational History    Occupation: disabled     Employer: N/A   Social Needs    Financial resource strain: Not on file    Food insecurity     Worry: Not on file     Inability: Not on file    Transportation needs     Medical: Not on file     Non-medical: Not on file   Tobacco Use    Smoking status: Former Smoker     Packs/day: 3.00     Years: 41.00     Pack years: 123.00     Types: Cigarettes     Last attempt to quit: 2000     Years since quittin.7    Smokeless tobacco: Never Used    Tobacco comment: quit in    Substance and Sexual Activity    Alcohol use: No     Alcohol/week: 0.0 standard drinks    Drug use: No    Sexual activity: Not Currently   Lifestyle    Physical activity     Days per week: Not on file     Minutes per session: Not on file    Stress: Not on file   Relationships    Social connections     Talks on phone: Not on file     Gets together: Not on file     Attends Hinduism service: Not on file     Active member of club or organization: Not on file     Attends meetings of clubs or organizations: Not on file     Relationship status: Not on file    Intimate partner violence     Fear of current or ex partner: Not on file     Emotionally abused: Not on file     Physically abused: Not on file     Forced sexual activity: Not on file   Other Topics Concern    Not on file   Social History Narrative    Not on file     Counseling given: Yes  Comment: quit in 2000        Family History   Problem Relation Age of Onset    Diabetes Mother     Heart Disease Mother     Stomach Cancer Father     Diabetes Maternal Grandmother         also aunts and uncles (maternal)    Emphysema Sister              -rest of complaints with corresponding details per ROS    The patient's past medical, surgical, social, and family history as well as her current medications and allergies were reviewed as documented intoday's encounter. Review of Systems   Constitutional: Negative for activity change, appetite change, fatigue, fever and unexpected weight change. HENT: Negative for facial swelling, hearing loss, mouth sores, postnasal drip and rhinorrhea. Eyes: Negative for photophobia and visual disturbance. Respiratory: Negative for chest tightness, shortness of breath and wheezing. Cardiovascular: Negative for chest pain, palpitations and leg swelling. Gastrointestinal: Negative for abdominal distention, abdominal pain, constipation, nausea and vomiting. Endocrine: Negative for polyphagia and polyuria. Genitourinary: Negative for difficulty urinating, flank pain, frequency, urgency and vaginal pain. Musculoskeletal: Positive for arthralgias, back pain, gait problem, joint swelling and myalgias. Neurological: Positive for weakness and numbness.  Negative for dizziness and headaches. Psychiatric/Behavioral: Negative for agitation, behavioral problems, decreased concentration, dysphoric mood and sleep disturbance. The patient is not nervous/anxious. Physical Exam  Vitals signs and nursing note reviewed. Constitutional:       Appearance: She is obese. HENT:      Head: Normocephalic and atraumatic. Nose: Nose normal.      Mouth/Throat:      Mouth: Mucous membranes are moist.   Eyes:      Extraocular Movements: Extraocular movements intact. Pupils: Pupils are equal, round, and reactive to light. Neck:      Musculoskeletal: Normal range of motion and neck supple. Cardiovascular:      Rate and Rhythm: Normal rate and regular rhythm. Heart sounds: Normal heart sounds. Pulmonary:      Effort: Pulmonary effort is normal.      Breath sounds: Normal breath sounds. No wheezing. Abdominal:      General: There is no distension. Palpations: Abdomen is soft. Tenderness: There is no abdominal tenderness. There is no guarding. Musculoskeletal:      Right knee: She exhibits decreased range of motion. Tenderness found. Medial joint line tenderness noted. Left knee: She exhibits decreased range of motion. Tenderness found. Medial joint line tenderness noted. Lumbar back: She exhibits decreased range of motion, tenderness, pain and spasm. She exhibits no edema. Neurological:      Mental Status: She is alert and oriented to person, place, and time. Cranial Nerves: Cranial nerves are intact. Sensory: Sensory deficit present. Motor: Motor function is intact. Coordination: Coordination is intact. Comments: Uses scooter for walking   Psychiatric:         Attention and Perception: Attention normal.         Mood and Affect: Mood and affect normal.         Speech: Speech normal.         Thought Content: Thought content normal.         Cognition and Memory: Cognition normal.             ASSESSMENT AND PLAN      1. Type 2 diabetes mellitus with diabetic polyneuropathy, with long-term current use of insulin (HCC)  -A1c has increased uncontrolled, increase insulin to 50 units twice a day monitor blood glucose 2-3 times per day, use her sliding scale more often 3 times per day continue metformin follow-up in 4 weeks. - POCT glycosylated hemoglobin (Hb A1C)  - insulin glargine (LANTUS SOLOSTAR) 100 UNIT/ML injection pen; Inject 50 Units into the skin 2 times daily  Dispense: 5 pen; Refill: 3    2. HTN (hypertension), benign  Controlled continue same medications    3. Mixed hyperlipidemia  Uncontrolled start on with CPAP, repeat lipid panel  - Icosapent Ethyl (VASCEPA) 1 g CAPS capsule; Take 1 capsule by mouth 2 times daily  Dispense: 60 capsule; Refill: 3  - Lipid Panel; Future    4. Chest pain at rest  Chronic pain on and off recent stress test negative follow-up with cardiologist    5. Lumbosacral spondylosis without myelopathy  Discussed with patient we cannot add more medications neurology on narcotics and muscle relaxant. 6. Encounter for screening mammogram for breast cancer    - [facility] Digital Screen Bilateral [[id number]]; Future    7. Need for prophylactic vaccination against diphtheria-tetanus-pertussis (DTP)    - Tdap (ADACEL) 5-2-15.5 LF-MCG/0.5 injection; Inject 0.5 mLs into the muscle once for 1 dose  Dispense: 0.5 mL; Refill: 0    8. Need for prophylactic vaccination and inoculation against varicella    - zoster recombinant adjuvanted vaccine (SHINGRIX) 50 MCG/0.5ML SUSR injection; 50 MCG IM then repeat 2-6 months. Dispense: 0.5 mL; Refill: 1    9.  Need for vaccination    - INFLUENZA, QUADV, ADJUVANTED, 72 YRS =, IM, PF, PREFILL SYR, 0.5ML (FLUAD)      Orders Placed This Encounter   Procedures    [facility] Digital Screen Bilateral [[id number]]     Standing Status:   Future     Standing Expiration Date:   9/14/2021     Order Specific Question:   Reason for exam:     Answer:   screening    DAI Bennett, [id number] past year? no no no no no no     The patient does not have a history of falls. I did , complete a risk assessment for falls. A plan of care for falls in-office gait and balance testing performed using The Timed Up and Go Test was negative for increased falls risk- no further intervention is currently indicated, home safety tips provided, No Treatment plan indicated    Lab Results   Component Value Date    LDLCHOLESTEROL 162 (H) 07/02/2020    (goal LDL reduction with dx if diabetes is 50% LDL reduction)    PHQ Scores 6/25/2020 9/24/2019 3/7/2019 2/14/2018 7/15/2015   PHQ2 Score 1 1 2 0 0   PHQ9 Score 1 1 2 0 0     Interpretation of Total Score Depression Severity: 1-4 = Minimal depression, 5-9 = Mild depression, 10-14 = Moderate depression, 15-19 = Moderately severe depression, 20-27 = Severe depression      The patient'spast medical, surgical, social, and family history as well as her   current medications and allergies were reviewed as documented in today's encounter. Medications, labs, diagnostic studies, consultations andfollow-up as documented in this encounter. Return in about 4 weeks (around 10/12/2020) for dm ,htn, hld. Patient wasseen with total face to face time of 25 minutes. More than 50% of this visit was counseling and education. Future Appointments   Date Time Provider Clarence Mas   10/1/2020 10:00 AM Karen De La Torre, APRN - CNP [address]   10/14/2020 10:00 AM Emerald Gonzalez MD fp sc [id number]   11/2/2020 11:00 AM Lucy Dennis DPM [id number]     This note was completed by using the assistance of a speech-recognition program. However, inadvertent computerized transcription errors may be present. Althoughevery effort was made to ensure accuracy, no guarantees can be provided that every mistake has been identified and corrected by editing.   Electronically signed by Emerald Gonzalez MD on 9/14/2020  4:41 PM

## 2020-09-14 NOTE — PROGRESS NOTES
Visit Information    Have you changed or started any medications since your last visit including any over-the-counter medicines, vitamins, or herbal medicines? no   Are you having any side effects from any of your medications? -  no  Have you stopped taking any of your medications? Is so, why? -  no    Have you seen any other physician or provider since your last visit? Yes - Records Obtained  Have you had any other diagnostic tests since your last visit? Yes - Records Obtained  Have you been seen in the emergency room and/or had an admission to a hospital since we last saw you? No  Have you had your routine dental cleaning in the past 6 months? no    Have you activated your [employer] account? If not, what are your barriers?  No:      Patient Care Team:  Alex Otoole MD as PCP - General (Family Medicine)  [facility]  Tiffany Reilly MD as Consulting Physician (Urology)  Irvin Polk MD as Consulting Physician (Pulmonology)  Evert Chacon MD as Consulting Physician (Internal Medicine)  Natalya Gonsales (Optometry)  VERONIKA Ponce CNP as Nurse Practitioner (Certified Nurse Practitioner)    Medical History Review  Past Medical, Family, and Social History reviewed and does contribute to the patient presenting condition    Health Maintenance   Topic Date Due    DTaP/Tdap/Td vaccine (1 - Tdap) 12/24/1968    Shingles Vaccine (1 of 2) 12/24/1999    Annual Wellness Visit (AWV)  05/29/2019    Colon cancer screen colonoscopy  04/10/2020    Flu vaccine (1) 09/01/2020    Breast cancer screen  09/27/2020    Diabetic retinal exam  01/30/2021    A1C test (Diabetic or Prediabetic)  02/12/2021    Lipid screen  07/02/2021    Potassium monitoring  07/02/2021    Creatinine monitoring  07/02/2021    Diabetic foot exam  08/24/2021    DEXA (modify frequency per FRAX score)  Completed    Pneumococcal 65+ years Vaccine  Completed    Hepatitis C screen  Completed    Hepatitis A vaccine  Aged Out    Hib vaccine  Aged Out    Meningococcal (ACWY) vaccine  Aged Out

## 2020-10-01 ENCOUNTER — HOSPITAL ENCOUNTER (OUTPATIENT)
Dept: PAIN MANAGEMENT | Age: 70
Discharge: HOME | End: 2020-10-01
Payer: COMMERCIAL

## 2020-10-01 PROCEDURE — 99213 OFFICE O/P EST LOW 20 MIN: CPT

## 2020-10-01 PROCEDURE — 99442 PR PHYS/QHP TELEPHONE EVALUATION 11-20 MIN: CPT | Performed by: NURSE PRACTITIONER

## 2020-10-01 RX ORDER — OXYCODONE HYDROCHLORIDE AND ACETAMINOPHEN 5; 325 MG/1; MG/1
1 TABLET ORAL SEE ADMIN INSTRUCTIONS
Qty: 100 TABLET | Refills: 0 | Status: SHIPPED | OUTPATIENT
Start: 2020-10-02 | End: 2020-10-30 | Stop reason: SDUPTHER

## 2020-10-01 ASSESSMENT — ENCOUNTER SYMPTOMS
GASTROINTESTINAL NEGATIVE: 1
BACK PAIN: 1
EYES NEGATIVE: 1

## 2020-10-01 NOTE — PROGRESS NOTES
Snehal 88 PROGRESS NOTE      Patient  Phone call to  review Medication Agreement    Chief Complaint: low back pain    She c/o low back pain which radiates down her legs, Her pain has not changed, She has no history of lumbar surgery, She states PT did not help. She states did not get much relief from [facility] in , She uses oxygen at night, Her sleep varies. She does back exercises. No Ed visits. Back Pain   This is a chronic problem. The problem occurs constantly. The problem is unchanged. The pain is present in the lumbar spine. The quality of the pain is described as aching. Radiates to: down legs. The pain is at a severity of 7/10. The pain is moderate. The pain is the same all the time. Exacerbated by: walking. Associated symptoms include headaches. Risk factors include menopause and obesity. She has tried analgesics and heat for the symptoms. The treatment provided mild relief. Patient denies any new neurological symptoms. No bowel or bladder incontinence, no weakness, and no falling. Any new diagnostic workup: [x] no  [] yes [] Xray [] CT scan [] MRI [] DEXA scan     [] Other                    Treatment goals:  Functional status: reduce pain to a 3-4      Aberrancy:   Any alcoholic beverages   no    Any illegal drugs   no    Analgesia:7    Adverse  Effects :none    ADL;s :home exercises        Pill count: appropriate fill date 10-2-2020 states # 3 percocet tabs left    Morphine equivalent dose as reported on [place]AR[place]:30  Periodic Controlled Substance Monitoring: Obtaining appropriate analgesic effect of treatment. , Possible medication side effects, risk of tolerance/dependence & alternative treatments discussed., No signs of potential drug abuse or diversion identified. , Assessed functional status. Jimy Perez, APRN - CNP)  Review of[place]ARRS does not show any aberrant prescription behavior. Medication is helping the patient stay active.  Patient denies any side effects and reports adequate analgesia. No sign of misuse/abuse. When was thelast UDS:      1-6-2020       Was the UDS appropriate:yes      Record/Diagnostics Review:      As above, I did review the imaging    1/9/2020  8:36 PM - Carmelo, Justino Incoming Lab Results From [facility]     Component  Value  Ref Range & Units  Status  Collected  Lab    Pain Management Drug Panel Interp, Urine  Consistent   Final  01/06/2020 11:40 AM  [facility]    (NOTE)   ________________________________________________________________   DRUGS EXPECTED:   PERCOCET (OXYCODONE) [1/6/20]   ________________________________________________________________   CONSISTENT with medications provided:   PERCOCET (OXYCODONE) : based on oxycodone, noroxycodone   ________________________________________________________________   Drugs Not Included in this Assay:   Acetaminophen   ________________________________________________________________   INTERPRETIVE INFORMATION: [facility] [place], [place] Spec/EMIT, Ur,                            Interp   Interpretation depends on accuracy and completeness of patient   medication information submitted by client. Past Medical History:   Diagnosis Date    Allergic rhinitis     Anxiety 7/17/2013    Arthritis     Asthma     Atrial fibrillation (HCC)     Back pain     NERVE/DR. GRACIA    Benign hypertension with CKD (chronic kidney disease) stage III     CAD (coronary artery disease) 3/21/2013    Caffeine use     2 coffee/day    CHF (congestive heart failure) (HCC)     Chronic kidney disease     CKD (chronic kidney disease) stage 3, GFR 30-59 ml/min     COPD (chronic obstructive pulmonary disease) (HCC)     emphysema    Degeneration of lumbar or lumbosacral intervertebral disc     Depression     DM (diabetes mellitus) (HCC)     Emphysema of lung (HCC)     Gastritis     GERD (gastroesophageal reflux disease)     Hearing loss     Hematuria     Hiatal hernia     HTN (hypertension), benign 6/28/2014    Hypertriglyceridemia     Incontinence of urine 9/25/2013    Knee arthropathy     Lumbosacral spondylosis without myelopathy 9/29/2014    Migraine headache     DR. GRACIA    Mumps     Neuropathy     Obesity     On home oxygen therapy     2 L PER NC  HS AND PRN    HIGINIO on CPAP     Otitis media 1-26-15    Secondary diabetes mellitus with stage 3 chronic kidney disease (GFR 30-59) (HCC)     Stroke (HCC)     QUESTIONABLE    Tinnitus     Type 2 diabetes mellitus without complication (HCC)     Type II or unspecified type diabetes mellitus without mention of complication, not stated as uncontrolled     UTI (lower urinary tract infection)     Varicose vein        Past Surgical History:   Procedure Laterality Date    ABLATION OF DYSRHYTHMIC FOCUS  2000    CARPAL TUNNEL RELEASE Right     CATARACT REMOVAL WITH IMPLANT Bilateral     CHOLECYSTECTOMY  2007    COLONOSCOPY      COLONOSCOPY  04/10/2017    tubular adenomo polyp , mod. sigmoid diverticulosis, min. int. hemorrhoids    CYSTOSCOPY  9/25/2013    DILATION AND CURETTAGE  1979    EYE SURGERY      laser    INCONTINENCE SURGERY      Percutaneous nerve stimulation Dr Darya Amaya 2013     INSERTABLE CARDIAC MONITOR  12/04/2015    [facility] REVEAL LINQ MODEL #[id number] MRI CONDTIONAL OK 3T.  IMMEDIATELY POST IMPLANT    OTHER SURGICAL HISTORY  09/10/15    removal of interstim    TN COLSC FLX W/REMOVAL LESION BY HOT BX FORCEPS N/A 4/10/2017    COLONOSCOPY POLYPECTOMY HOT BIOPSY performed by Tin Almazan MD at [address] [facility]      TYMPANOPLASTY      TYMPANOPLASTY      TYMPANOSTOMY TUBE PLACEMENT  08/21/2017    UPPER GASTROINTESTINAL ENDOSCOPY  03/2016    Dr Brooke Pierre       Allergies   Allergen Reactions    Robitussin [Guaifenesin] Anaphylaxis    Codeine Swelling    Compazine [Prochlorperazine Maleate] Hives and Swelling    Iodides Itching    Morphine Other (See Comments)     hallucinations    Moxifloxacin      Other reaction(s): Intolerance-unknown  Other reaction(s): Intolerance-unknown    Reglan [Metoclopramide] Nausea Only    Avelox [Moxifloxacin Hcl In Nacl] Rash     Dermatitis      Cipro Xr Rash     dermatitis    Sulfa Antibiotics Rash         Current Outpatient Medications:     insulin glargine (LANTUS SOLOSTAR) 100 UNIT/ML injection pen, Inject 50 Units into the skin 2 times daily, Disp: 5 pen, Rfl: 3    Icosapent Ethyl (VASCEPA) 1 g CAPS capsule, Take 1 capsule by mouth 2 times daily, Disp: 60 capsule, Rfl: 3    oxyCODONE-acetaminophen (PERCOCET) 5-325 MG per tablet, Take 1 tablet by mouth See Admin Instructions for 30 days. Intended supply: 30 days. 1 tab 3-4 times a day prn, Disp: 100 tablet, Rfl: 0    vitamin B-12 (CYANOCOBALAMIN) 100 MCG tablet, Take 50 mcg by mouth daily, Disp: , Rfl:     isosorbide dinitrate (ISORDIL) 30 MG tablet, Take 30 mg by mouth, Disp: , Rfl:     metFORMIN (GLUCOPHAGE) 1000 MG tablet, TAKE 1 TAB BY MOUTH TWICE A DAY ( IN THE MORNING AND BEDTIME ) , Disp: 180 tablet, Rfl: 3    ofloxacin (OCUFLOX) 0.3 % solution, , Disp: , Rfl:     gabapentin (NEURONTIN) 300 MG capsule, Take 1 capsule by mouth 3 times daily for 30 days. , Disp: 90 capsule, Rfl: 2    nystatin (MYCOSTATIN) 238304 UNIT/GM powder, Apply 3 times daily. , Disp: 3 Bottle, Rfl: 3    clopidogrel (PLAVIX) 75 MG tablet, TAKE 1 TAB BY MOUTH ONCE A DAY ( AM ) -THIS MEDICINE MAY BE TAKENWITH OR WITHOUT FOOD, Disp: 90 tablet, Rfl: 1    topiramate (TOPAMAX) 100 MG tablet, Take 1 tablet by mouth nightly, Disp: 90 tablet, Rfl: 1    tobramycin-dexamethasone (TOBRADEX) 0.3-0.1 % ophthalmic suspension, , Disp: , Rfl:     aspirin 81 MG chewable tablet, Take 1 tablet by mouth daily, Disp: 90 tablet, Rfl: 3    ferrous sulfate 325 (65 Fe) MG tablet, TAKE 1 TAB BY MOUTH EVERY MORNING, Disp: 90 tablet, Rfl: 5    magnesium oxide (MAG-OX) 400 MG tablet, TAKE 1 TAB BY MOUTH TWICE A DAY ( AM AND BEDTIME ), Disp: 180 tablet, Rfl: 3   carvedilol (COREG) 3.125 MG tablet, TAKE 1 TAB BY MOUTH TWICE A DAY ( AM AND BEDTIME ), Disp: 180 tablet, Rfl: 3    citalopram (CELEXA) 40 MG tablet, TAKE 1/2  TAB BY MOUTH TWICE  A DAY, Disp: 90 tablet, Rfl: 3    pantoprazole (PROTONIX) 40 MG tablet, Take 1 tablet by mouth 2 times daily, Disp: 60 tablet, Rfl: 3    losartan (COZAAR) 25 MG tablet, TAKE 1 TAB BY MOUTH ONCE A DAY, Disp: 90 tablet, Rfl: 5    SYMBICORT 160-4.5 MCG/ACT AERO,  2 puffs As needed for sob, Disp: , Rfl:     zoster recombinant adjuvanted vaccine (SHINGRIX) 50 MCG/0.5ML SUSR injection, 50 MCG IM then repeat 2-6 months., Disp: 0.5 mL, Rfl: 1    ULTICARE MINI PEN NEEDLES 31G X 6 MM MISC, USE AS DIRECTED , Disp: 100 each, Rfl: 5    naloxone 4 MG/0.1ML LIQD nasal spray, 1 spray by Nasal route as needed for Opioid Reversal, Disp: 1 each, Rfl: 0    dicyclomine (BENTYL) 10 MG capsule, Take 1 capsule by mouth 2 times daily as needed (abdominal spasm), Disp: 60 capsule, Rfl: 0    insulin aspart (NOVOLOG FLEXPEN) 100 UNIT/ML injection pen, If <139 - No Insulin; 140-199-2 Units;200-249-4 Units;250-299-6 Units;300-349-8 Units;350-400=10 Units; Above 400-12 Units, Disp: 5 pen, Rfl: 3    lidocaine (LMX) 4 % cream, Apply topically every 8 hrs as needed for pain, Disp: 45 g, Rfl: 3    Lift Chair MISC, by Does not apply route, Disp: 1 each, Rfl: 0    Misc.  Devices (ADJUST BATH/SHOWER SEAT/BACK) MISC, Use daily for shower, Disp: 1 each, Rfl: 0    TRUE METRIX BLOOD GLUCOSE TEST strip, THREE TIMES A DAY, Disp: 1 each, Rfl: 3    Alcohol Swabs (B-D SINGLE USE SWABS REGULAR) PADS, THREE TIMES A DAY, Disp: 100 each, Rfl: 0    furosemide (LASIX) 20 MG tablet, Take 1 tablet by mouth daily as needed (weight gain 3 lbs overnight), Disp: 30 tablet, Rfl: 3    Lift Chair MISC, by Does not apply route Use daily at home to help with ADL's, Disp: 1 each, Rfl: 0    nitroGLYCERIN (NITROSTAT) 0.4 MG SL tablet, 1 under the tongue as needed for angina, may repeat q5mins for up three doses, Disp: , Rfl:     Blood Glucose Monitoring Suppl HARMEET, Use daily to check BS, Disp: 1 Device, Rfl: 0    ipratropium-albuterol (DUONEB) 0.5-2.5 (3) MG/3ML SOLN nebulizer solution, Inhale 3 mLs into the lungs every 4 hours, Disp: 360 mL, Rfl: 2    Melatonin 10 MG TABS, Take 10 mg by mouth nightly, Disp: 90 tablet, Rfl: 3    Compression Stockings MISC, by Does not apply route Pressure between 20 - 25 ., Disp: 1 each, Rfl: 0    docusate sodium (COLACE) 100 MG capsule, Take 1 capsule by mouth 2 times daily Please use while taking Percocet, Disp: 30 capsule, Rfl: 0    OXYGEN, Inhale 3 L into the lungs , Disp: , Rfl:     Family History   Problem Relation Age of Onset    Diabetes Mother     Heart Disease Mother     Stomach Cancer Father     Diabetes Maternal Grandmother         also aunts and uncles (maternal)    Emphysema Sister        Social History     Socioeconomic History    Marital status: Legally      Spouse name: Not on file    Number of children: Not on file    Years of education: Not on file    Highest education level: Not on file   Occupational History    Occupation: disabled     Employer: N/A   Social Needs    Financial resource strain: Not on file    Food insecurity     Worry: Not on file     Inability: Not on file    Transportation needs     Medical: Not on file     Non-medical: Not on file   Tobacco Use    Smoking status: Former Smoker     Packs/day: 3.00     Years: 41.00     Pack years: 123.00     Types: Cigarettes     Last attempt to quit: 2000     Years since quittin.7    Smokeless tobacco: Never Used    Tobacco comment: quit in    Substance and Sexual Activity    Alcohol use: No     Alcohol/week: 0.0 standard drinks    Drug use: No    Sexual activity: Not Currently   Lifestyle    Physical activity     Days per week: Not on file     Minutes per session: Not on file    Stress: Not on file   Relationships    Social connections Talks on phone: Not on file     Gets together: Not on file     Attends Church service: Not on file     Active member of club or organization: Not on file     Attends meetings of clubs or organizations: Not on file     Relationship status: Not on file    Intimate partner violence     Fear of current or ex partner: Not on file     Emotionally abused: Not on file     Physically abused: Not on file     Forced sexual activity: Not on file   Other Topics Concern    Not on file   Social History Narrative    Not on file         Review of Systems:  Review of Systems   Constitution: Negative. HENT: Negative. Eyes: Negative. Cardiovascular: Negative. Respiratory:        Oxygen at night   Endocrine:        Diabetic blood sugar 124 today   Hematologic/Lymphatic: Bruises/bleeds easily. Skin: Negative. Musculoskeletal: Positive for back pain and joint pain. Hip pain   Gastrointestinal: Negative. Genitourinary: Positive for nocturia. Neurological: Positive for headaches. Uses walker or electric scooter   Psychiatric/Behavioral: Positive for depression. Managing         Physical Exam:  LMP  (LMP Unknown)     Physical Exam  Skin:         Neurological:      Mental Status: She is alert and oriented to person, place, and time. Psychiatric:         Mood and Affect: Mood normal.         Thought Content: Thought content normal.           Assessment:    Problem List Items Addressed This Visit     Spondylarthrosis - Primary    Medication monitoring encounter (Chronic)    Lumbosacral spondylosis without myelopathy (Chronic)    DDD (degenerative disc disease), lumbar    DDD (degenerative disc disease), cervical (Chronic)          Treatment Plan:  DISCUSSION: Treatment options discussed withpatient and all questions answered to patient's satisfaction.      Possible side effects, risk of tolerance and or dependence and alternative treatments discussed    Obtaining appropriate analgesic effect of treatment   No signs of potential drug abuse or diversion identified    [x] Ill effects of being on chronic pain medications such as sleep disturbances, respiratory depression, hormonal changes, withdrawal symptoms, chronic opioid dependence and tolerance as well as risk of taking opioids with Benzodiazepines and taking opioids along with alcohol,  werediscussed with patient. I had asked the patient to minimize medication use and utilize pain medications only for uncontrolled rest pain or pain with exertional activities. I advised patient not to self-escalate painmedications without consulting with us. At each of patient's future visits we will try to taper pain medications, while adjusting the adjunct medications, and re-evaluating for Physical Therapy to improve spinal andjoint strength. We will continue to have discussions to decrease pain medications as tolerated. Counseled patient on effects their pain medication and /or their medical condition mayhave on their  ability to drive or operate machinery. Instructed not to drive or operate machinery if drowsy     I also discussed with the patient regarding the dangers of combining narcotic pain medication with tranquilizers, alcohol or illegal drugs or taking the medication any way other than prescribed. The dangers were discussed  including respiratory depression and death. Patient was told to tell  all  physicians regarding the medications he is getting from pain clinic. Patient is warned not to take any unprescribed medications over-the-countermedications that can depress breathing . Patient is advised to talk to the pharmacist or physicians if planning to take any over-the-counter medications before  takeing them. Patient is strongly advised to avoid tranquilizers or  relaxants, illegal drugs  or any medications that can depress breathing  Patient is also advised to tell us if there is any changes in their medications from other physicians.     Location: patient  at   home  ,provider working from home  Phone call: 13  minutes  TREATMENT OPTIONS:       Medication Agreement Requirements Met  Continue Opioid therapy  Script written for percocet  Follow up appointment made

## 2020-10-07 RX ORDER — CLOPIDOGREL BISULFATE 75 MG/1
TABLET ORAL
Qty: 90 TABLET | Refills: 1 | Status: SHIPPED | OUTPATIENT
Start: 2020-10-07 | End: 2020-10-17

## 2020-10-07 NOTE — TELEPHONE ENCOUNTER
Please Approve or Refuse.   Send to Pharmacy per Pt's Request:    Next Visit Date:  10/14/2020   Last Visit Date: 9/14/2020    Hemoglobin A1C (%)   Date Value   09/14/2020 10.6   02/12/2020 8.4   10/31/2019 8.7 (H)             ( goal A1C is < 7)   BP Readings from Last 3 Encounters:   09/14/20 130/86   07/02/20 (!) 105/46   06/25/20 118/80          (goal 120/80)  BUN   Date Value Ref Range Status   07/02/2020 17 8 - 23 mg/dL Final     CREATININE   Date Value Ref Range Status   07/02/2020 1.02 (H) 0.50 - 0.90 mg/dL Final     Potassium   Date Value Ref Range Status   07/02/2020 4.7 3.7 - 5.3 mmol/L Final

## 2020-10-08 ENCOUNTER — TELEPHONE (OUTPATIENT)
Dept: FAMILY MEDICINE CLINIC | Age: 70
End: 2020-10-08

## 2020-10-08 RX ORDER — INSULIN GLARGINE 100 [IU]/ML
INJECTION, SOLUTION SUBCUTANEOUS
Qty: 15 ML | Refills: 3 | Status: SHIPPED | OUTPATIENT
Start: 2020-10-08 | End: 2020-12-02

## 2020-10-08 NOTE — TELEPHONE ENCOUNTER
Patient has a bruise on her Rt leg, she doesn't know where she got it from . it is not going away and its getting bigger . There is no swelling , and leg is not warm .      Patient is asking for advice

## 2020-10-08 NOTE — TELEPHONE ENCOUNTER
Patiens can get bruises when they are on blood thinners , if patient is concerned, we can get her in in office jean with any provider.

## 2020-10-09 RX ORDER — TOPIRAMATE 100 MG/1
100 TABLET, FILM COATED ORAL NIGHTLY
Qty: 90 TABLET | Refills: 1 | OUTPATIENT
Start: 2020-10-09

## 2020-10-17 RX ORDER — CLOPIDOGREL BISULFATE 75 MG/1
TABLET ORAL
Qty: 90 TABLET | Refills: 1 | Status: SHIPPED | OUTPATIENT
Start: 2020-10-17 | End: 2021-04-14

## 2020-10-19 RX ORDER — TOPIRAMATE 100 MG/1
100 TABLET, FILM COATED ORAL NIGHTLY
Qty: 90 TABLET | Refills: 1 | Status: SHIPPED | OUTPATIENT
Start: 2020-10-19 | End: 2020-11-30 | Stop reason: SDUPTHER

## 2020-10-28 ENCOUNTER — VIRTUAL VISIT (OUTPATIENT)
Dept: FAMILY MEDICINE CLINIC | Age: 70
End: 2020-10-28
Payer: COMMERCIAL

## 2020-10-28 ENCOUNTER — TELEPHONE (OUTPATIENT)
Dept: FAMILY MEDICINE CLINIC | Age: 70
End: 2020-10-28

## 2020-10-28 PROCEDURE — 99442 PR PHYS/QHP TELEPHONE EVALUATION 11-20 MIN: CPT | Performed by: FAMILY MEDICINE

## 2020-10-28 RX ORDER — METHYLPREDNISOLONE 4 MG/1
TABLET ORAL
Qty: 1 KIT | Refills: 0 | Status: ON HOLD | OUTPATIENT
Start: 2020-10-28 | End: 2020-11-03 | Stop reason: HOSPADM

## 2020-10-28 ASSESSMENT — ENCOUNTER SYMPTOMS
BACK PAIN: 1
SHORTNESS OF BREATH: 1
CONSTIPATION: 0
PHOTOPHOBIA: 0
GASTROINTESTINAL NEGATIVE: 1
ABDOMINAL PAIN: 0
EYE REDNESS: 0
COUGH: 0

## 2020-10-28 NOTE — PROGRESS NOTES
Visit Information    Have you changed or started any medications since your last visit including any over-the-counter medicines, vitamins, or herbal medicines? no   Are you having any side effects from any of your medications? -  no  Have you stopped taking any of your medications? Is so, why? -  no    Have you seen any other physician or provider since your last visit? Yes - Records Obtained  Have you had any other diagnostic tests since your last visit? No  Have you been seen in the emergency room and/or had an admission to a hospital since we last saw you? Yes - Records Obtained  Have you had your routine dental cleaning in the past 6 months? no    Have you activated your [employer] account? If not, what are your barriers?  No:      Patient Care Team:  Ravindra Vang MD as PCP - General (Family Medicine)  Ravindra Vang MD as PCP - [facility]  Spencer Patterson MD as Consulting Physician (Urology)  Katherine Roque MD as Consulting Physician (Pulmonology)  Juice Renee MD as Consulting Physician (Internal Medicine)  Esteban Leal (Optometry)  VERONIKA Dutton - CNP as Nurse Practitioner (Certified Nurse Practitioner)    Medical History Review  Past Medical, Family, and Social History reviewed and does contribute to the patient presenting condition    Health Maintenance   Topic Date Due    DTaP/Tdap/Td vaccine (1 - Tdap) 12/24/1968    Shingles Vaccine (1 of 2) 12/24/1999    Annual Wellness Visit (AWV)  05/29/2019    Colon cancer screen colonoscopy  04/10/2020    Breast cancer screen  09/27/2020    A1C test (Diabetic or Prediabetic)  12/14/2020    Diabetic retinal exam  01/30/2021    Lipid screen  07/02/2021    Potassium monitoring  07/02/2021    Creatinine monitoring  07/02/2021    Diabetic foot exam  08/24/2021    DEXA (modify frequency per FRAX score)  Completed    Flu vaccine  Completed    Pneumococcal 65+ years Vaccine  Completed    Hepatitis C screen  Completed    Hepatitis

## 2020-10-28 NOTE — PROGRESS NOTES
 Degeneration of lumbar or lumbosacral intervertebral disc     Depression     DM (diabetes mellitus) (HCC)     Emphysema of lung (HCC)     Gastritis     GERD (gastroesophageal reflux disease)     Hearing loss     Hematuria     Hiatal hernia     HTN (hypertension), benign 6/28/2014    Hypertriglyceridemia     Incontinence of urine 9/25/2013    Knee arthropathy     Lumbosacral spondylosis without myelopathy 9/29/2014    Migraine headache     DR. 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 sigmoid diverticulosis, min. int. hemorrhoids    CYSTOSCOPY  9/25/2013    DILATION AND CURETTAGE  1979    EYE SURGERY      laser    INCONTINENCE SURGERY      Percutaneous nerve stimulation Dr Nel Chester Nov 2013     INSERTABLE CARDIAC MONITOR  12/04/2015    [employer] REVEAL LINQ MODEL #[id number] MRI CONDTIONAL OK 3T.  IMMEDIATELY POST IMPLANT    OTHER SURGICAL HISTORY  09/10/15    removal of interstim    SC COLSC FLX W/REMOVAL LESION BY HOT BX FORCEPS N/A 4/10/2017    COLONOSCOPY POLYPECTOMY HOT BIOPSY performed by Reginaldo Butt MD at [address] [facility] [address]      TYMPANOPLASTY      TYMPANOSTOMY TUBE PLACEMENT  08/21/2017    UPPER GASTROINTESTINAL ENDOSCOPY  2016    Dr Zeb Winn       Family History   Problem Relation Age of Onset    Diabetes Mother     Heart Disease Mother     Stomach Cancer Father     Diabetes Maternal Grandmother         also aunts and uncles (maternal)    Emphysema Sister        Social History     Socioeconomic History    Marital status: Legally      Spouse name: None    Number of children: None    Years of education: None    Highest education level: None   Occupational History    Occupation: disabled     Employer: N/A   Social Needs    Financial resource strain: None    Food insecurity     Worry: None     Inability: None    Transportation needs     Medical: None     Non-medical: None   Tobacco Use    Smoking status: Former Smoker     Packs/day: 3.00     Years: 41.00     Pack years: 123.00     Types: Cigarettes     Last attempt to quit: 2000     Years since quittin.8    Smokeless tobacco: Never Used    Tobacco comment: quit in    Substance and Sexual Activity    Alcohol use: No     Alcohol/week: 0.0 standard drinks    Drug use: No    Sexual activity: Not Currently   Lifestyle    Physical activity     Days per week: None     Minutes per session: None    Stress: None   Relationships    Social connections     Talks on phone: None     Gets together: None     Attends Yazidi service: None     Active member of club or organization: None     Attends meetings of clubs or organizations: None     Relationship status: None    Intimate partner violence     Fear of current or ex partner: None     Emotionally abused: None     Physically abused: None     Forced sexual activity: None   Other Topics Concern    None   Social History Narrative    None       Allergies   Allergen Reactions    Robitussin [Guaifenesin] Anaphylaxis    Codeine Swelling    Compazine [Prochlorperazine Maleate] Hives and Swelling    Iodides Itching    Morphine Other (See Comments)     hallucinations    Moxifloxacin      Other reaction(s): Intolerance-unknown  Other reaction(s): Intolerance-unknown    Reglan [Metoclopramide] Nausea Only    Avelox [Moxifloxacin Hcl In Nacl] Rash     Dermatitis      Cipro Xr Rash     dermatitis    Sulfa Antibiotics Rash       Current Outpatient Medications on File Prior to Encounter   Medication Sig Dispense Refill    methylPREDNISolone (MEDROL DOSEPACK) 4 MG tablet Take by mouth. 1 kit 0    topiramate (TOPAMAX) 100 MG tablet TAKE 1 TABLET BY MOUTH NIGHTLY  90 tablet 1    clopidogrel (PLAVIX) 75 MG tablet TAKE 1 TAB BY MOUTH ONCE A DAY ( IN THE MORNING ) -THIS MEDICINE MAY BE TAKENWITH OR WITHOUT FOOD  90 tablet 1    LANTUS SOLOSTAR 100 UNIT/ML injection pen INJECT 42 UNITS INTO THE SKIN 2 TIMES DAILY  15 mL 3    zoster recombinant adjuvanted vaccine (SHINGRIX) 50 MCG/0.5ML SUSR injection 50 MCG IM then repeat 2-6 months. (Patient not taking: Reported on 10/28/2020) 0.5 mL 1    Icosapent Ethyl (VASCEPA) 1 g CAPS capsule Take 1 capsule by mouth 2 times daily 60 capsule 3    vitamin B-12 (CYANOCOBALAMIN) 100 MCG tablet Take 50 mcg by mouth daily      isosorbide dinitrate (ISORDIL) 30 MG tablet Take 30 mg by mouth      ULTICARE MINI PEN NEEDLES 31G X 6 MM MISC USE AS DIRECTED  100 each 5    metFORMIN (GLUCOPHAGE) 1000 MG tablet TAKE 1 TAB BY MOUTH TWICE A DAY ( IN THE MORNING AND BEDTIME )  180 tablet 3    ofloxacin (OCUFLOX) 0.3 % solution       gabapentin (NEURONTIN) 300 MG capsule Take 1 capsule by mouth 3 times daily for 30 days. 90 capsule 2    naloxone 4 MG/0.1ML LIQD nasal spray 1 spray by Nasal route as needed for Opioid Reversal 1 each 0    nystatin (MYCOSTATIN) 481793 UNIT/GM powder Apply 3 times daily.  3 Bottle 3    dicyclomine (BENTYL) 10 MG capsule Take 1 capsule by mouth 2 times daily as needed (abdominal spasm) 60 capsule 0    tobramycin-dexamethasone (TOBRADEX) 0.3-0.1 % ophthalmic suspension       insulin aspart (NOVOLOG FLEXPEN) 100 UNIT/ML injection pen If <139 - No Insulin; 140-199-2 Units;200-249-4 Units;250-299-6 Units;300-349-8 Units;350-400=10 Units; Above 400-12 Units 5 pen 3    aspirin 81 MG chewable tablet Take 1 tablet by mouth daily 90 tablet 3    lidocaine (LMX) 4 % cream Apply topically every 8 hrs as needed for pain 45 g 3    Lift Chair MISC by Does not apply route 1 each 0    Misc.  Devices (ADJUST BATH/SHOWER SEAT/BACK) MISC Use daily for shower 1 each 0    ferrous sulfate 325 (65 Fe) MG tablet TAKE 1 TAB BY MOUTH EVERY MORNING 90 tablet 5    magnesium oxide (MAG-OX) 400 MG tablet TAKE 1 TAB BY MOUTH TWICE A DAY ( AM AND BEDTIME ) 180 tablet 3    carvedilol (COREG) 3.125 MG tablet TAKE 1 TAB BY MOUTH TWICE A DAY ( AM AND BEDTIME ) 180 tablet 3    citalopram (CELEXA) 40 MG tablet TAKE 1/2  TAB BY MOUTH TWICE  A DAY 90 tablet 3    pantoprazole (PROTONIX) 40 MG tablet Take 1 tablet by mouth 2 times daily 60 tablet 3    TRUE METRIX BLOOD GLUCOSE TEST strip THREE TIMES A DAY 1 each 3    Alcohol Swabs (B-D SINGLE USE SWABS REGULAR) PADS THREE TIMES A  each 0    furosemide (LASIX) 20 MG tablet Take 1 tablet by mouth daily as needed (weight gain 3 lbs overnight) 30 tablet 3    losartan (COZAAR) 25 MG tablet TAKE 1 TAB BY MOUTH ONCE A DAY 90 tablet 5    Lift Chair MISC by Does not apply route Use daily at home to help with ADL's 1 each 0    nitroGLYCERIN (NITROSTAT) 0.4 MG SL tablet 1 under the tongue as needed for angina, may repeat q5mins for up three doses      Blood Glucose Monitoring Suppl HARMEET Use daily to check BS 1 Device 0    ipratropium-albuterol (DUONEB) 0.5-2.5 (3) MG/3ML SOLN nebulizer solution Inhale 3 mLs into the lungs every 4 hours 360 mL 2    Melatonin 10 MG TABS Take 10 mg by mouth nightly 90 tablet 3    Compression Stockings MISC by Does not apply route Pressure between 20 - 25 . 1 each 0    docusate sodium (COLACE) 100 MG capsule Take 1 capsule by mouth 2 times daily Please use while taking Percocet 30 capsule 0    SYMBICORT 160-4.5 MCG/ACT AERO   2 puffs As needed for sob      OXYGEN Inhale 3 L into the lungs        No current facility-administered medications on file prior to encounter. Review of Systems   Constitutional: Negative for activity change, appetite change, diaphoresis and unexpected weight change. HENT: Negative for congestion, hearing loss, sore throat and tinnitus. Eyes: Negative for photophobia, redness and visual disturbance. Respiratory: Positive for shortness of breath. Negative for cough. Emphysema   Cardiovascular: Positive for chest pain and palpitations. Negative for leg swelling. Irregular heart beat   Gastrointestinal: Negative. Negative for abdominal pain and constipation. Endocrine: Negative for heat intolerance and polyuria. Genitourinary: Negative for dysuria and hematuria. Musculoskeletal: Positive for back pain. Negative for arthralgias. Skin: Negative. Negative for rash. Neurological: Positive for weakness. Negative for tingling and numbness. Hematological: Bruises/bleeds easily. Psychiatric/Behavioral: Negative for confusion, self-injury, sleep disturbance and suicidal ideas. The patient is nervous/anxious. Physical Exam  Skin:         Neurological:      Mental Status: She is alert and oriented to person, place, and time. Psychiatric:         Mood and Affect: Mood normal.            DATA:  LAB. :  1/9/2020  8:36 PM - [place], [facility] Incoming Lab Results From [facility]     Component  Value  Ref Range & Units  Status  Collected  Lab    Pain Management Drug Panel Interp, Urine  Consistent   Final  01/06/2020 11:40 AM  [facility]    (NOTE)   ________________________________________________________________   DRUGS EXPECTED:   PERCOCET (OXYCODONE) [1/6/20]   ________________________________________________________________   CONSISTENT with medications provided:   PERCOCET (OXYCODONE) : based on oxycodone, noroxycodone ________________________________________________________________   Drugs Not Included in this Assay:   Acetaminophen        X-Ray reports:  EXAMINATION:    MRI OF THE LUMBAR SPINE WITHOUT AND WITH CONTRAST  10/23/2019 5:48 pm         TECHNIQUE:    Multiplanar multisequence MRI of the lumbar spine was performed without and    with the administration of intravenous contrast.         COMPARISON:    MRI lumbar spine 01/08/2018         HISTORY:    ORDERING SYSTEM PROVIDED HISTORY:    TECHNOLOGIST PROVIDED HISTORY:    back pain, weakness, epidural injection 5 weeks ago         FINDINGS:    BONES/ALIGNMENT: There is normal alignment of the spine. The vertebral body    heights are maintained. The bone marrow signal appears unremarkable. Multilevel Schmorl's nodes identified involving the lower thoracic and upper    lumbar spine.         SPINAL CORD:  The conus terminates normally.         SOFT TISSUES: No abnormal enhancement is seen of the lumbar spine.  No    paraspinal mass identified.         L1-L2: Minimal degenerative loss of disc space height and signal.  There is    no significant disc protrusion, spinal canal stenosis or neural foraminal    narrowing.         L2-L3: Moderate disc space is identified with mild circumferential disc    bulge.  Minimal central canal stenosis.  Mild facet arthropathy.  No central    foramina.  No focal disc protrusion.         L3-L4: Mild disc space disease identified with circumferential disc bulge. There is mild right and moderate left neural foraminal narrowing.  Moderate    degenerate facet arthropathy.  Mild central canal stenosis.         L4-L5: Mild disc space disease identified with circumferential disc bulge. There is a right paracentral disc extrusion which extends caudally 1.2 cm and    effaces the right lateral recess affecting the traversing right L5 nerve    root.  Otherwise mild neural foraminal narrowing.  No central canal stenosis.     Moderate facet arthropathy.         L5-S1: Minimal degenerative loss of disc space height and signal.  No focal    disc protrusion.  Minimal neural foraminal narrowing.  Mild-to-moderate facet    arthropathy.              Impression    Right paracentral disc extrusion L4-5 narrowing the right lateral recess. Please correlate with possible right L5 radiculopathy.  This is new from    previous MRI.         Otherwise mild degenerate change.         No evidence of discitis osteomyelitis or epidural abscess. Clinical  impression:  1. Lumbosacral spondylosis without myelopathy    2. DDD (degenerative disc disease), lumbar    3. Lumbar radiculopathy, chronic    4. Sacroiliitis, not elsewhere classified ([address]    5. Spondylarthrosis    6. Other osteoarthritis of spine, cervical region    7. DDD (degenerative disc disease), cervical    8. Nonintractable migraine, unspecified migraine type    9. Bilateral occipital neuralgia    10. Chronic pain of left knee    11. Morbid obesity ([address]    12. Medication monitoring encounter        Plan of care: We will continue current pain medications  Current medications are being tolerated without any Adverse side effects. Orders Placed This Encounter   Medications    oxyCODONE-acetaminophen (PERCOCET) 5-325 MG per tablet     Sig: Take 1 tablet by mouth See Admin Instructions for 30 days. Intended supply: 30 days. 1 tab 3-4 times a day prn     Dispense:  100 tablet     Refill:  0     Reduce doses taken as pain becomes manageable     Urine drug screens have been appropriate. No aberrant activity noted. Analgesia is achieved. Activities of daily living are possible because of medications. Safe use of medications explained to patient.     PDMP Monitoring:    Last PDMP Mesha Jj as Reviewed [facility]):  Review User Review Instant Review Result   ARMIDA ARREOLA 10/29/2020  7:51 AM Reviewed PDMP [1]     Counselling/Preventive measures for pain  Control:    [x]  Spine strengthening exercises are discussed with patient in detail. [x] Ill effects of being on chronic pain medications such as sleep disturbances, hormonal changes, withdrawal symptoms,  chronic opioid dependence and tolerance were discussed with patient. I had asked the patient to minimize medication use and utilize pain medications only for uncontrolled rest pain or pain with exertional activities. I advised patient not to self escalate pain medications without consulting with us. At each of patient's future visits we will try to taper pain medications, while adjusting the adjunct medications, and re-evaluating for Physical Therapy to improve spinal and joint strength. We will continue to have discussions to decrease pain medications as tolerated. I also discussed with the patient regarding the dangers of combining narcotic pain medication with tranquilizers, alcohol or illegal drugs or taking the medication any other than prescribed. The dangers including the respiratory depression and death. Patient was told to tell  to all  physicians regarding the medications he is getting from pain clinic. Patient is warned not to take any unprescribed medications over-the-counter medications that can depress breathing . Patient is advised to talk to the pharmacist or physicians if planning to take any over-the-counter medications before  takeing them. Patient is strongly advised to avoid tranquilizers or  Relaxants for any medications that can depress breathing or recreational drugs. Patient is also advised to tell us if there is any changes in his medications from other physicians. We discussed the same at today's visit and have not been to implement it, as the patient's pain is not under control with current medications. Decision Making Process : Patient's health history and referral records thoroughly reviewed before focused physical examination and discussion with patient. I have spent 20 mins.   Over 50% of today's visit is spent on examining the patient and counseling and coordinating the care. Level of complexity of date to be reviewed is Moderate. The chart date reviewed include the following: Imaging Reports. Summary of Care. Time spent reviewing with patient the below reports:   Medication safety, Treatment options. Level of diagnosis and management options of this case is multiple: involving the following management options: Interventions as needed, medication management as appropriate, future visits, activity modification, heat/ice as needed, Urine drug screen as required. [x]The patient's questions were answered to the best of my abilities. This note was created using voice recognition software. There may be inaccuracies of transcription  that are inadvertently overlooked prior to the signature. There is any questions about the transcription please contact me. Return in  4 weeks  with physician / CNP  for further plan of treatment. Due to the COVID-19 pandemic and the appropriate interventions by Luis Felipe Shelton, our non-urgent pain management patients will not be seen in the office at this time for their protection and the protection of our staff. To offer continuity of care, their prescriptions will be escribed this month after a careful chart review and review of their OARRS report  Pursuant to the emergency declaration under the [address] and [facility], [facility] waiver authority and the [employer] and [employer] Act, this Virtual Visit was conducted, with patient's consent, to reduce the patient's risk of exposure to COVID-19 and provide continuity of care for an established patient. Services were provided through a video synchronous discussion virtually to substitute for in-person appointment. \"  Documentation:  I communicated with the patient and/or health care decision maker about plan of care  Details of this discussion including any medical advice provided: Total Time: minutes: 11-20 minutes    I affirm this is a Patient Initiated Episode with an Established Patient who has not had a related appointment within my department in the past 7 days or scheduled within the next 24 hours.     Electronically signed by Alex Reyna MD on 10/30/2020 at 4:58 PM

## 2020-10-28 NOTE — TELEPHONE ENCOUNTER
Patient called about back pain, its been hurting last few days , today is worse  , appt can be scheduled for tomorrow , patient said she will go to ED  Pain is sever

## 2020-10-28 NOTE — PROGRESS NOTES
Rock Posadas is a 78 y.o. female evaluated via telephone on 10/28/2020. Consent:  She and/or health care decision maker is aware that that she may receive a bill for this telephone service, depending on her insurance coverage, and has provided verbal consent to proceed: Yes      Documentation:  I communicated with the patient and/or health care decision maker about   Patient called about her back pain, reported her back pain got worse. Patient wanted to go to ER. Patient reports she called squad and they told her they cannot do anything for her. Patient has chronic lumbar stenosis radiculopathy follows with pain management is on multiple medications including narcotics also, reports since last 3 days back pain got worse with moderate lower back, denies any radiation. Patient took 2 Percocets that relieved her pain. Reports one was not helping. She is also on muscle relaxant. Patient reports her pain has improved but she was scared and wanted to go to ER. She denies any bladder or bowel incontinence. Denies any weakness. Patient usually uses her scooter for mobility. .   Details of this discussion including any medical advice provided:     1. Acute exacerbation of chronic low back pain  Discussed with patient you are on multiple medications for back pain, continue all and do some stretching. Short course of steroids ordered  - methylPREDNISolone (MEDROL DOSEPACK) 4 MG tablet; Take by mouth. Dispense: 1 kit; Refill: 0    2. Lumbar radiculopathy, chronic  Patient has appointment with pain management on Friday. - methylPREDNISolone (MEDROL DOSEPACK) 4 MG tablet; Take by mouth. Dispense: 1 kit; Refill: 0      I affirm this is a Patient Initiated Episode with a Patient who has not had a related appointment within my department in the past 7 days or scheduled within the next 24 hours.     Patient identification was verified at the start of the visit: Yes    Total Time: minutes: 11-20 minutes    Note: not billable if this call serves to triage the patient into an appointment for the relevant concern      Ronaldo Ocasio

## 2020-10-30 ENCOUNTER — HOSPITAL ENCOUNTER (OUTPATIENT)
Dept: PAIN MANAGEMENT | Age: 70
Discharge: HOME | End: 2020-10-30
Payer: COMMERCIAL

## 2020-10-30 ENCOUNTER — APPOINTMENT (OUTPATIENT)
Dept: GENERAL RADIOLOGY | Age: 70
DRG: 637 | End: 2020-10-30
Payer: COMMERCIAL

## 2020-10-30 ENCOUNTER — HOSPITAL ENCOUNTER (INPATIENT)
Age: 70
LOS: 4 days | Discharge: ANOTHER HOSPITAL | DRG: 637 | End: 2020-11-03
Attending: EMERGENCY MEDICINE
Payer: COMMERCIAL

## 2020-10-30 PROBLEM — R73.9 HYPERGLYCEMIA: Status: ACTIVE | Noted: 2020-10-30

## 2020-10-30 LAB
ABSOLUTE EOS #: <0.03 K/UL (ref 0–0.44)
ABSOLUTE IMMATURE GRANULOCYTE: 0.05 K/UL (ref 0–0.3)
ABSOLUTE LYMPH #: 1.09 K/UL (ref 1.1–3.7)
ABSOLUTE MONO #: 0.4 K/UL (ref 0.1–1.2)
ALBUMIN SERPL-MCNC: 4 G/DL (ref 3.5–5.2)
ALBUMIN/GLOBULIN RATIO: 1.4 (ref 1–2.5)
ALLEN TEST: ABNORMAL
ALP BLD-CCNC: 62 U/L (ref 35–104)
ALT SERPL-CCNC: 29 U/L (ref 5–33)
ANION GAP SERPL CALCULATED.3IONS-SCNC: 13 MMOL/L (ref 9–17)
ANION GAP: 6 MMOL/L (ref 7–16)
AST SERPL-CCNC: 21 U/L
BASOPHILS # BLD: 0 % (ref 0–2)
BASOPHILS ABSOLUTE: 0.03 K/UL (ref 0–0.2)
BETA-HYDROXYBUTYRATE: 0.11 MMOL/L (ref 0.02–0.27)
BILIRUB SERPL-MCNC: 0.2 MG/DL (ref 0.3–1.2)
BILIRUBIN URINE: NEGATIVE
BUN BLDV-MCNC: 28 MG/DL (ref 8–23)
BUN/CREAT BLD: ABNORMAL (ref 9–20)
CALCIUM SERPL-MCNC: 9 MG/DL (ref 8.6–10.4)
CHLORIDE BLD-SCNC: 94 MMOL/L (ref 98–107)
CO2: 26 MMOL/L (ref 20–31)
COLOR: YELLOW
COMMENT UA: ABNORMAL
CREAT SERPL-MCNC: 1.19 MG/DL (ref 0.5–0.9)
DIFFERENTIAL TYPE: ABNORMAL
EOSINOPHILS RELATIVE PERCENT: 0 % (ref 1–4)
FIO2: ABNORMAL
GFR AFRICAN AMERICAN: 54 ML/MIN
GFR NON-AFRICAN AMERICAN: 40 ML/MIN
GFR NON-AFRICAN AMERICAN: 45 ML/MIN
GFR SERPL CREATININE-BSD FRML MDRD: 48 ML/MIN
GFR SERPL CREATININE-BSD FRML MDRD: ABNORMAL ML/MIN/{1.73_M2}
GLUCOSE BLD-MCNC: 175 MG/DL (ref 65–105)
GLUCOSE BLD-MCNC: 414 MG/DL (ref 65–105)
GLUCOSE BLD-MCNC: 420 MG/DL (ref 70–99)
GLUCOSE BLD-MCNC: 460 MG/DL (ref 74–100)
GLUCOSE URINE: ABNORMAL
HCO3 VENOUS: 30.2 MMOL/L (ref 22–29)
HCT VFR BLD CALC: 42.6 % (ref 36.3–47.1)
HEMOGLOBIN: 13.6 G/DL (ref 11.9–15.1)
IMMATURE GRANULOCYTES: 1 %
KETONES, URINE: NEGATIVE
LACTIC ACID, SEPSIS WHOLE BLOOD: 1.7 MMOL/L (ref 0.5–1.9)
LACTIC ACID, SEPSIS WHOLE BLOOD: 2.8 MMOL/L (ref 0.5–1.9)
LACTIC ACID, SEPSIS: ABNORMAL MMOL/L (ref 0.5–1.9)
LACTIC ACID, SEPSIS: NORMAL MMOL/L (ref 0.5–1.9)
LEUKOCYTE ESTERASE, URINE: NEGATIVE
LYMPHOCYTES # BLD: 14 % (ref 24–43)
MCH RBC QN AUTO: 30.6 PG (ref 25.2–33.5)
MCHC RBC AUTO-ENTMCNC: 31.9 G/DL (ref 28.4–34.8)
MCV RBC AUTO: 95.9 FL (ref 82.6–102.9)
MODE: ABNORMAL
MONOCYTES # BLD: 5 % (ref 3–12)
NEGATIVE BASE EXCESS, VEN: ABNORMAL (ref 0–2)
NITRITE, URINE: NEGATIVE
NRBC AUTOMATED: 0 PER 100 WBC
O2 DEVICE/FLOW/%: ABNORMAL
O2 SAT, VEN: 86 % (ref 60–85)
PATIENT TEMP: ABNORMAL
PCO2, VEN: 59.4 MM HG (ref 41–51)
PDW BLD-RTO: 12.6 % (ref 11.8–14.4)
PH UA: 5 (ref 5–8)
PH VENOUS: 7.32 (ref 7.32–7.43)
PLATELET # BLD: 186 K/UL (ref 138–453)
PLATELET ESTIMATE: ABNORMAL
PMV BLD AUTO: 11.6 FL (ref 8.1–13.5)
PO2, VEN: 57.2 MM HG (ref 30–50)
POC CHLORIDE: 99 MMOL/L (ref 98–107)
POC CREATININE: 1.32 MG/DL (ref 0.51–1.19)
POC HEMATOCRIT: 47 % (ref 36–46)
POC HEMOGLOBIN: 16 G/DL (ref 12–16)
POC IONIZED CALCIUM: 1.17 MMOL/L (ref 1.15–1.33)
POC LACTIC ACID: 2.47 MMOL/L (ref 0.56–1.39)
POC PCO2 TEMP: ABNORMAL MM HG
POC PH TEMP: ABNORMAL
POC PO2 TEMP: ABNORMAL MM HG
POC POTASSIUM: 5.5 MMOL/L (ref 3.5–4.5)
POC SODIUM: 135 MMOL/L (ref 138–146)
POSITIVE BASE EXCESS, VEN: 2 (ref 0–3)
POTASSIUM SERPL-SCNC: 5 MMOL/L (ref 3.7–5.3)
PROTEIN UA: NEGATIVE
RBC # BLD: 4.44 M/UL (ref 3.95–5.11)
RBC # BLD: ABNORMAL 10*6/UL
SAMPLE SITE: ABNORMAL
SEG NEUTROPHILS: 80 % (ref 36–65)
SEGMENTED NEUTROPHILS ABSOLUTE COUNT: 6.21 K/UL (ref 1.5–8.1)
SODIUM BLD-SCNC: 133 MMOL/L (ref 135–144)
SPECIFIC GRAVITY UA: 1.03 (ref 1–1.03)
TOTAL CO2, VENOUS: 32 MMOL/L (ref 23–30)
TOTAL PROTEIN: 6.9 G/DL (ref 6.4–8.3)
TROPONIN INTERP: NORMAL
TROPONIN T: NORMAL NG/ML
TROPONIN, HIGH SENSITIVITY: 7 NG/L (ref 0–14)
TURBIDITY: CLEAR
URINE HGB: NEGATIVE
UROBILINOGEN, URINE: NORMAL
WBC # BLD: 7.8 K/UL (ref 3.5–11.3)
WBC # BLD: ABNORMAL 10*3/UL

## 2020-10-30 PROCEDURE — 2580000003 HC RX 258: Performed by: STUDENT IN AN ORGANIZED HEALTH CARE EDUCATION/TRAINING PROGRAM

## 2020-10-30 PROCEDURE — 82330 ASSAY OF CALCIUM: CPT

## 2020-10-30 PROCEDURE — 82565 ASSAY OF CREATININE: CPT

## 2020-10-30 PROCEDURE — 99442 PR PHYS/QHP TELEPHONE EVALUATION 11-20 MIN: CPT | Performed by: PAIN MEDICINE

## 2020-10-30 PROCEDURE — 85025 COMPLETE CBC W/AUTO DIFF WBC: CPT

## 2020-10-30 PROCEDURE — 82947 ASSAY GLUCOSE BLOOD QUANT: CPT

## 2020-10-30 PROCEDURE — 6370000000 HC RX 637 (ALT 250 FOR IP): Performed by: EMERGENCY MEDICINE

## 2020-10-30 PROCEDURE — 1200000000 HC SEMI PRIVATE

## 2020-10-30 PROCEDURE — 93005 ELECTROCARDIOGRAM TRACING: CPT | Performed by: STUDENT IN AN ORGANIZED HEALTH CARE EDUCATION/TRAINING PROGRAM

## 2020-10-30 PROCEDURE — 6360000002 HC RX W HCPCS

## 2020-10-30 PROCEDURE — 2500000003 HC RX 250 WO HCPCS

## 2020-10-30 PROCEDURE — 81003 URINALYSIS AUTO W/O SCOPE: CPT

## 2020-10-30 PROCEDURE — 84295 ASSAY OF SERUM SODIUM: CPT

## 2020-10-30 PROCEDURE — 82803 BLOOD GASES ANY COMBINATION: CPT

## 2020-10-30 PROCEDURE — 83605 ASSAY OF LACTIC ACID: CPT

## 2020-10-30 PROCEDURE — 84484 ASSAY OF TROPONIN QUANT: CPT

## 2020-10-30 PROCEDURE — 84132 ASSAY OF SERUM POTASSIUM: CPT

## 2020-10-30 PROCEDURE — 6370000000 HC RX 637 (ALT 250 FOR IP): Performed by: STUDENT IN AN ORGANIZED HEALTH CARE EDUCATION/TRAINING PROGRAM

## 2020-10-30 PROCEDURE — 71046 X-RAY EXAM CHEST 2 VIEWS: CPT

## 2020-10-30 PROCEDURE — 85014 HEMATOCRIT: CPT

## 2020-10-30 PROCEDURE — 80053 COMPREHEN METABOLIC PANEL: CPT

## 2020-10-30 PROCEDURE — 82010 KETONE BODYS QUAN: CPT

## 2020-10-30 PROCEDURE — 82435 ASSAY OF BLOOD CHLORIDE: CPT

## 2020-10-30 PROCEDURE — 99285 EMERGENCY DEPT VISIT HI MDM: CPT

## 2020-10-30 PROCEDURE — 99213 OFFICE O/P EST LOW 20 MIN: CPT

## 2020-10-30 RX ORDER — 0.9 % SODIUM CHLORIDE 0.9 %
1000 INTRAVENOUS SOLUTION INTRAVENOUS ONCE
Status: COMPLETED | OUTPATIENT
Start: 2020-10-30 | End: 2020-10-30

## 2020-10-30 RX ORDER — PANTOPRAZOLE SODIUM 40 MG/1
40 TABLET, DELAYED RELEASE ORAL 2 TIMES DAILY
Status: DISCONTINUED | OUTPATIENT
Start: 2020-10-30 | End: 2020-11-03 | Stop reason: HOSPADM

## 2020-10-30 RX ORDER — CLOPIDOGREL BISULFATE 75 MG/1
75 TABLET ORAL DAILY
Status: DISCONTINUED | OUTPATIENT
Start: 2020-10-31 | End: 2020-11-03 | Stop reason: HOSPADM

## 2020-10-30 RX ORDER — TOPIRAMATE 100 MG/1
100 TABLET, FILM COATED ORAL NIGHTLY
Status: DISCONTINUED | OUTPATIENT
Start: 2020-10-30 | End: 2020-11-03 | Stop reason: HOSPADM

## 2020-10-30 RX ORDER — SODIUM CHLORIDE 9 MG/ML
INJECTION, SOLUTION INTRAVENOUS CONTINUOUS
Status: DISCONTINUED | OUTPATIENT
Start: 2020-10-30 | End: 2020-11-03 | Stop reason: HOSPADM

## 2020-10-30 RX ORDER — CARVEDILOL 3.12 MG/1
3.12 TABLET ORAL 2 TIMES DAILY
Status: DISCONTINUED | OUTPATIENT
Start: 2020-10-30 | End: 2020-11-03 | Stop reason: HOSPADM

## 2020-10-30 RX ORDER — GABAPENTIN 300 MG/1
300 CAPSULE ORAL 3 TIMES DAILY
Status: DISCONTINUED | OUTPATIENT
Start: 2020-10-30 | End: 2020-11-03 | Stop reason: HOSPADM

## 2020-10-30 RX ORDER — LOSARTAN POTASSIUM 25 MG/1
25 TABLET ORAL DAILY
Status: DISCONTINUED | OUTPATIENT
Start: 2020-10-31 | End: 2020-11-03 | Stop reason: HOSPADM

## 2020-10-30 RX ORDER — ASPIRIN 81 MG/1
81 TABLET, CHEWABLE ORAL DAILY
Status: DISCONTINUED | OUTPATIENT
Start: 2020-10-31 | End: 2020-11-03 | Stop reason: HOSPADM

## 2020-10-30 RX ORDER — ACETAMINOPHEN 500 MG
1000 TABLET ORAL ONCE
Status: COMPLETED | OUTPATIENT
Start: 2020-10-30 | End: 2020-10-30

## 2020-10-30 RX ORDER — OXYCODONE HYDROCHLORIDE AND ACETAMINOPHEN 5; 325 MG/1; MG/1
1 TABLET ORAL SEE ADMIN INSTRUCTIONS
Qty: 100 TABLET | Refills: 0 | Status: ON HOLD | OUTPATIENT
Start: 2020-11-01 | End: 2020-11-03 | Stop reason: HOSPADM

## 2020-10-30 RX ORDER — OXYCODONE HYDROCHLORIDE AND ACETAMINOPHEN 5; 325 MG/1; MG/1
1 TABLET ORAL ONCE
Status: COMPLETED | OUTPATIENT
Start: 2020-10-30 | End: 2020-10-30

## 2020-10-30 RX ORDER — CITALOPRAM 20 MG/1
40 TABLET ORAL DAILY
Status: DISCONTINUED | OUTPATIENT
Start: 2020-10-31 | End: 2020-11-03 | Stop reason: HOSPADM

## 2020-10-30 RX ORDER — ACETAMINOPHEN 325 MG/1
650 TABLET ORAL EVERY 4 HOURS PRN
Status: DISCONTINUED | OUTPATIENT
Start: 2020-10-30 | End: 2020-11-03 | Stop reason: HOSPADM

## 2020-10-30 RX ADMIN — ACETAMINOPHEN 1000 MG: 500 TABLET ORAL at 18:40

## 2020-10-30 RX ADMIN — INSULIN LISPRO 2 UNITS: 100 INJECTION, SOLUTION INTRAVENOUS; SUBCUTANEOUS at 23:11

## 2020-10-30 RX ADMIN — MAGNESIUM GLUCONATE 500 MG ORAL TABLET 400 MG: 500 TABLET ORAL at 23:10

## 2020-10-30 RX ADMIN — PANTOPRAZOLE SODIUM 40 MG: 40 TABLET, DELAYED RELEASE ORAL at 23:10

## 2020-10-30 RX ADMIN — GABAPENTIN 300 MG: 300 CAPSULE ORAL at 23:10

## 2020-10-30 RX ADMIN — OXYCODONE HYDROCHLORIDE AND ACETAMINOPHEN 1 TABLET: 5; 325 TABLET ORAL at 23:09

## 2020-10-30 RX ADMIN — TOPIRAMATE 100 MG: 100 TABLET, FILM COATED ORAL at 23:10

## 2020-10-30 RX ADMIN — CARVEDILOL 3.12 MG: 3.12 TABLET, FILM COATED ORAL at 23:09

## 2020-10-30 RX ADMIN — SODIUM CHLORIDE: 9 INJECTION, SOLUTION INTRAVENOUS at 23:25

## 2020-10-30 RX ADMIN — SODIUM CHLORIDE 1000 ML: 9 INJECTION, SOLUTION INTRAVENOUS at 18:40

## 2020-10-30 SDOH — HEALTH STABILITY: MENTAL HEALTH: HOW OFTEN DO YOU HAVE A DRINK CONTAINING ALCOHOL?: NEVER

## 2020-10-30 ASSESSMENT — ENCOUNTER SYMPTOMS
ABDOMINAL PAIN: 0
VOMITING: 0
SORE THROAT: 0
SHORTNESS OF BREATH: 0

## 2020-10-30 ASSESSMENT — PAIN DESCRIPTION - FREQUENCY: FREQUENCY: INTERMITTENT

## 2020-10-30 ASSESSMENT — PAIN SCALES - GENERAL
PAINLEVEL_OUTOF10: 8
PAINLEVEL_OUTOF10: 7
PAINLEVEL_OUTOF10: 8

## 2020-10-30 ASSESSMENT — PAIN DESCRIPTION - DESCRIPTORS: DESCRIPTORS: POUNDING

## 2020-10-30 ASSESSMENT — PAIN DESCRIPTION - LOCATION: LOCATION: HEAD

## 2020-10-30 ASSESSMENT — PAIN DESCRIPTION - PAIN TYPE: TYPE: ACUTE PAIN;CHRONIC PAIN

## 2020-10-30 NOTE — ED PROVIDER NOTES
[facility] ED  Emergency Department Encounter  EmergencyMedicine Resident     Pt Name:Charlotte Verner German  MRN: [id number]  [place] 1950  Date of evaluation: 10/30/20  PCP:  Shalom Burdick MD    [address] [place] [address]       Chief Complaint   Patient presents with    Hyperglycemia     BLOOD SUGAR FLUCTUATING FOR 2 DAYS, RECENTLY PLACED ON PREDNISONE FOR BACK PAIN       HISTORY OF PRESENT ILLNESS  (Location/Symptom, Timing/Onset, Context/Setting, Quality, Duration, Modifying Factors, Severity.)      Syed Simmons is a 78 y.o. female who presents with complaint of hyperglycemia. States that she has been taking prednisone for the last 2 days secondary to back pain. Patient is a type II diabetic who takes Lantus 50 units twice daily as well as an insulin sliding scale. She is complaining of some mild blurriness of her vision check her blood sugar noted to be in the 500s at home. Has any fevers chills denies any nausea or vomiting denies any dysuria denies any chest pain or shortness of breath. She has been tolerating p.o. intake and ambulating as per her baseline.     PAST MEDICAL / SURGICAL / SOCIAL / FAMILY HISTORY      has a past medical history of Allergic rhinitis, Anxiety, Arthritis, Asthma, Atrial fibrillation (HCC), Back pain, Benign hypertension with CKD (chronic kidney disease) stage III, CAD (coronary artery disease), Caffeine use, CHF (congestive heart failure) ([address] Chronic kidney disease, CKD (chronic kidney disease) stage 3, GFR 30-59 ml/min, COPD (chronic obstructive pulmonary disease) ([address] Degeneration of lumbar or lumbosacral intervertebral disc, Depression, DM (diabetes mellitus) ([address] Emphysema of lung ([address] Gastritis, GERD (gastroesophageal reflux disease), Hearing loss, Hematuria, Hiatal hernia, HTN (hypertension), benign, Hypertriglyceridemia, Incontinence of urine, Knee arthropathy, Lumbosacral spondylosis without myelopathy, Migraine headache, Mumps, Neuropathy, Obesity, On home oxygen therapy, HIGINIO on CPAP, Otitis media, Secondary diabetes mellitus with stage 3 chronic kidney disease (GFR 30-59) ([facility]), Stroke ([facility] Tinnitus, Type 2 diabetes mellitus without complication ([facility] Type II or unspecified type diabetes mellitus without mention of complication, not stated as uncontrolled, UTI (lower urinary tract infection), and Varicose vein. has a past surgical history that includes Cholecystectomy (); Carpal tunnel release (Right); Tympanoplasty; Dilation & curettage (); Cystocopy (2013); Cataract removal with implant (Bilateral); Tonsillectomy; Incontinence surgery; ablation of dysrhythmic focus (); Upper gastrointestinal endoscopy (2016); eye surgery; Tubal ligation; other surgical history (09/10/15); Insertable Cardiac Monitor (2015); Tympanoplasty; Colonoscopy; Colonoscopy (04/10/2017); pr colsc flx w/removal lesion by hot bx forceps (N/A, 4/10/2017); and Tympanostomy tube placement (2017).       Social History     Socioeconomic History    Marital status: Legally      Spouse name: Not on file    Number of children: Not on file    Years of education: Not on file    Highest education level: Not on file   Occupational History    Occupation: disabled     Employer: N/A   Social Needs    Financial resource strain: Not on file    Food insecurity     Worry: Not on file     Inability: Not on file    Transportation needs     Medical: Not on file     Non-medical: Not on file   Tobacco Use    Smoking status: Former Smoker     Packs/day: 3.00     Years: 41.00     Pack years: 123.00     Types: Cigarettes     Last attempt to quit: 2000     Years since quittin.8    Smokeless tobacco: Never Used    Tobacco comment: quit in    Substance and Sexual Activity    Alcohol use: No     Alcohol/week: 0.0 standard drinks    Drug use: No    Sexual activity: Not Currently   Lifestyle    Physical activity     Days per week: Not on file     Minutes per session: Not on file    Stress: Not on file   Relationships    Social connections     Talks on phone: Not on file     Gets together: Not on file     Attends Voodoo service: Not on file     Active member of club or organization: Not on file     Attends meetings of clubs or organizations: Not on file     Relationship status: Not on file    Intimate partner violence     Fear of current or ex partner: Not on file     Emotionally abused: Not on file     Physically abused: Not on file     Forced sexual activity: Not on file   Other Topics Concern    Not on file   Social History Narrative    Not on file       Family History   Problem Relation Age of Onset    Diabetes Mother     Heart Disease Mother     Stomach Cancer Father     Diabetes Maternal Grandmother         also aunts and uncles (maternal)    Emphysema Sister        Allergies:  Robitussin [guaifenesin]; Codeine; Compazine [prochlorperazine maleate]; Iodides; Morphine; Moxifloxacin; Reglan [metoclopramide]; Avelox [moxifloxacin hcl in nacl]; Cipro xr; and Sulfa antibiotics    Home Medications:  Prior to Admission medications    Medication Sig Start Date End Date Taking? Authorizing Provider   oxyCODONE-acetaminophen (PERCOCET) 5-325 MG per tablet Take 1 tablet by mouth See Admin Instructions for 30 days. Intended supply: 30 days. 1 tab 3-4 times a day prn 11/1/20 12/1/20  Zunilda Celis MD   methylPREDNISolone (MEDROL DOSEPACK) 4 MG tablet Take by mouth.  10/28/20 11/3/20  Nereida Chavarria MD   topiramate (TOPAMAX) 100 MG tablet TAKE 1 TABLET BY MOUTH NIGHTLY  10/19/20   VERONIKA Marroquin - CNP   clopidogrel (PLAVIX) 75 MG tablet TAKE 1 TAB BY MOUTH ONCE A DAY ( IN THE MORNING ) -THIS MEDICINE MAY BE TAKENWITH OR WITHOUT FOOD  10/17/20   Nereida Chavarria MD   LANTUS SOLOSTAR 100 UNIT/ML injection pen INJECT 42 UNITS INTO THE SKIN 2 TIMES DAILY  10/8/20   Nereida Chavarria MD   zoster recombinant adjuvanted vaccine [facility]) 50 MCG/0.5ML SUSR injection 50 MCG IM then repeat 2-6 months. Patient not taking: Reported on 10/28/2020 9/14/20 9/14/21  Liz Chen MD   Icosapent Ethyl (VASCEPA) 1 g CAPS capsule Take 1 capsule by mouth 2 times daily 9/14/20   Liz Chen MD   vitamin B-12 (CYANOCOBALAMIN) 100 MCG tablet Take 50 mcg by mouth daily    Historical Provider, MD   isosorbide dinitrate (ISORDIL) 30 MG tablet Take 30 mg by mouth    Historical Provider, MD   ULTICARE MINI PEN NEEDLES 31G X 6 MM MISC USE AS DIRECTED  8/14/20   Liz Chen MD   metFORMIN (GLUCOPHAGE) 1000 MG tablet TAKE 1 TAB BY MOUTH TWICE A DAY ( IN THE MORNING AND BEDTIME )  8/10/20   Liz Chen MD   ofloxacin (OCUFLOX) 0.3 % solution  7/29/20   Historical Provider, MD   gabapentin (NEURONTIN) 300 MG capsule Take 1 capsule by mouth 3 times daily for 30 days. 7/30/20 10/1/20  VERONIKA Kim CNP   naloxone 4 MG/0.1ML LIQD nasal spray 1 spray by Nasal route as needed for Opioid Reversal 7/30/20   VERONIKA Kim CNP   nystatin (MYCOSTATIN) 148364 UNIT/GM powder Apply 3 times daily. 6/25/20   Liz Chen MD   dicyclomine (BENTYL) 10 MG capsule Take 1 capsule by mouth 2 times daily as needed (abdominal spasm) 6/25/20   Liz Chen MD   tobramycin-dexamethasone (TOBRADEX) 0.3-0.1 % ophthalmic suspension  1/30/20   Historical Provider, MD   insulin aspart (NOVOLOG FLEXPEN) 100 UNIT/ML injection pen If <139 - No Insulin; 140-199-2 Units;200-249-4 Units;250-299-6 Units;300-349-8 Units;350-400=10 Units; Above 400-12 Units 2/12/20   Liz Chen MD   aspirin 81 MG chewable tablet Take 1 tablet by mouth daily 12/3/19   Liz Chen MD   lidocaine (LMX) 4 % cream Apply topically every 8 hrs as needed for pain 11/11/19   Liz Chen MD   Lift Chair MISC by Does not apply route 9/24/19   Liz Chen MD   Misc.  Devices (ADJUST BATH/SHOWER SEAT/BACK) MISC Use daily for shower 9/24/19   Liz Chen MD   ferrous sulfate 325 (65 Fe) MG tablet TAKE 1 TAB BY MOUTH EVERY MORNING 8/8/19   Gregor Mehta MD   magnesium oxide (MAG-OX) 400 MG tablet TAKE 1 TAB BY MOUTH TWICE A DAY ( AM AND BEDTIME ) 8/8/19   Gregor Mehta MD   carvedilol (COREG) 3.125 MG tablet TAKE 1 TAB BY MOUTH TWICE A DAY ( AM AND BEDTIME ) 8/8/19   Gregor Mehta MD   citalopram (CELEXA) 40 MG tablet TAKE 1/2  TAB BY MOUTH TWICE  A DAY 4/2/19   Gregor Mehta MD   pantoprazole (PROTONIX) 40 MG tablet Take 1 tablet by mouth 2 times daily 3/15/19   Kalpana Billingsley DO   TRUE METRIX BLOOD GLUCOSE TEST strip THREE TIMES A DAY 12/13/18   Gregor Mehta MD   Alcohol Swabs (B-D SINGLE USE SWABS REGULAR) PADS THREE TIMES A DAY 12/12/18   Gregor Mehta MD   furosemide (LASIX) 20 MG tablet Take 1 tablet by mouth daily as needed (weight gain 3 lbs overnight) 11/16/18   Lennox Oregon, MD   losartan (COZAAR) 25 MG tablet TAKE 1 TAB BY MOUTH ONCE A DAY 11/6/18   Gregor Mehta MD   Lift Chair MISC by Does not apply route Use daily at home to help with ADL's 11/6/18   Gregor Mehta MD   nitroGLYCERIN (NITROSTAT) 0.4 MG SL tablet 1 under the tongue as needed for angina, may repeat q5mins for up three doses 8/28/18   Historical Provider, MD   Blood Glucose Monitoring Suppl HARMEET Use daily to check BS 3/27/18   Gregor Mehta MD   ipratropium-albuterol (DUONEB) 0.5-2.5 (3) MG/3ML SOLN nebulizer solution Inhale 3 mLs into the lungs every 4 hours 2/6/18   Genia Ordonez MD   Melatonin 10 MG TABS Take 10 mg by mouth nightly 10/19/17   Gregor Mehta MD   Compression Stockings MISC by Does not apply route Pressure between 20 - 25 . 9/11/17   Gregor Mehta MD   docusate sodium (COLACE) 100 MG capsule Take 1 capsule by mouth 2 times daily Please use while taking Percocet 9/10/15   Nancy Hudson MD   SYMBICORT 160-4.5 MCG/ACT AERO   2 puffs As needed for sob 5/28/15   Historical Provider, MD   OXYGEN Inhale 3 L into the lungs     Historical Provider, MD       REVIEW OF SYSTEMS    (2-9 systems for level 4, 10 or more for level 5)      Review of Systems   Constitutional: Negative for chills and fever. HENT: Negative for congestion. Eyes: Positive for visual disturbance. Respiratory: Negative for shortness of breath. Cardiovascular: Negative for chest pain. Gastrointestinal: Negative for abdominal pain and vomiting. Genitourinary: Negative for difficulty urinating. Allergic/Immunologic: Positive for immunocompromised state. Diabetes and prednisone use   Neurological: Negative for headaches. Psychiatric/Behavioral: Negative for agitation. PHYSICAL EXAM   (up to 7 for level 4, 8 or more for level 5)      INITIAL VITALS:   BP (!) 146/63   Pulse 78   Temp 99.6 °F (37.6 °C) (Oral)   Resp 15   Ht 5' 2\" (1.575 m)   Wt 260 lb (117.9 kg)   LMP  (LMP Unknown)   SpO2 91%   BMI 47.55 kg/m²     Physical Exam  Vitals signs and nursing note reviewed. Constitutional:       General: She is not in acute distress. Appearance: She is obese. She is not ill-appearing, toxic-appearing or diaphoretic. HENT:      Right Ear: External ear normal.      Left Ear: External ear normal.      Nose: Nose normal.      Mouth/Throat:      Pharynx: Oropharynx is clear. Eyes:      General: No scleral icterus. Extraocular Movements: Extraocular movements intact. Conjunctiva/sclera: Conjunctivae normal.      Pupils: Pupils are equal, round, and reactive to light. Comments: Visual fields intact   Neck:      Musculoskeletal: Normal range of motion. Cardiovascular:      Rate and Rhythm: Normal rate. Pulses: Normal pulses. Pulmonary:      Effort: Pulmonary effort is normal. No respiratory distress. Breath sounds: Normal breath sounds. Abdominal:      Palpations: Abdomen is soft. Tenderness: There is no abdominal tenderness. Musculoskeletal: Normal range of motion. Skin:     General: Skin is warm.       Capillary Refill: Capillary refill takes less than 2 seconds. Neurological:      Mental Status: She is alert and oriented to person, place, and time. Psychiatric:         Mood and Affect: Mood normal.         DIFFERENTIAL  DIAGNOSIS     PLAN (LABS / IMAGING / EKG):  Orders Placed This Encounter   Procedures    XR CHEST (2 VW)    COMPREHENSIVE METABOLIC PANEL    BETA-HYDROXYBUTERATE    Urinalysis Reflex to Culture    Lactate, Sepsis    Troponin    CBC WITH AUTO DIFFERENTIAL    Hemoglobin and hematocrit, blood    SODIUM (POC)    POTASSIUM (POC)    CHLORIDE (POC)    CALCIUM, IONIC (POC)    POC Blood Gas    POC Glucose Fingerstick    Venous Blood Gas, POC    Creatinine W/GFR Point of Care    Lactic Acid, POC    POCT Glucose    Anion Gap (Calc) POC    EKG 12 Lead       MEDICATIONS ORDERED:  Orders Placed This Encounter   Medications    0.9 % sodium chloride bolus    acetaminophen (TYLENOL) tablet 1,000 mg       Differential DX: dka, hnks, steroid induced hyperglycemia. DIAGNOSTIC RESULTS / EMERGENCY DEPARTMENT COURSE / MDM   LAB RESULTS:  Results for orders placed or performed during the hospital encounter of 10/30/20   Urinalysis Reflex to Culture    Specimen: Urine, clean catch   Result Value Ref Range    Color, UA YELLOW YELLOW    Turbidity UA CLEAR CLEAR    Glucose, Ur 3+ (A) NEGATIVE    Bilirubin Urine NEGATIVE NEGATIVE    Ketones, Urine NEGATIVE NEGATIVE    Specific Gravity, UA 1.029 1.005 - 1.030    Urine Hgb NEGATIVE NEGATIVE    pH, UA 5.0 5.0 - 8.0    Protein, UA NEGATIVE NEGATIVE    Urobilinogen, Urine Normal Normal    Nitrite, Urine NEGATIVE NEGATIVE    Leukocyte Esterase, Urine NEGATIVE NEGATIVE    Urinalysis Comments       Microscopic exam not performed based on chemical results unless requested in original order.    Troponin   Result Value Ref Range    Troponin, High Sensitivity 7 0 - 14 ng/L    Troponin T NOT REPORTED <0.03 ng/mL    Troponin Interp NOT REPORTED    CBC WITH AUTO DIFFERENTIAL   Result Value Ref Range    WBC 7.8 3.5 REPORTED     FIO2 NOT REPORTED     Pt Temp NOT REPORTED     POC pH Temp NOT REPORTED     POC pCO2 Temp NOT REPORTED mm Hg    POC pO2 Temp NOT REPORTED mm Hg   Creatinine W/GFR Point of Care   Result Value Ref Range    POC Creatinine 1.32 (H) 0.51 - 1.19 mg/dL    GFR Comment 48 (L) >60 mL/min    GFR Non-African American 40 (L) >60 mL/min    GFR Comment         Lactic Acid, POC   Result Value Ref Range    POC Lactic Acid 2.47 (H) 0.56 - 1.39 mmol/L   POCT Glucose   Result Value Ref Range    POC Glucose 460 (HH) 74 - 100 mg/dL   Anion Gap (Calc) POC   Result Value Ref Range    Anion Gap 6 (L) 7 - 16 mmol/L       IMPRESSION: Alert oriented nontoxic 8year-old female no acute distress obese complaining of mild blurred vision and elevated blood glucose levels in the setting of recent prednisone initiation. Plan will be to obtain DKA work-up and rule out infectious and other ischemic causes. Anticipated dc if unremarkable workup    RADIOLOGY:  pending      EKG  Sinus rhythm at a rate of 66 leftward axis normal intervals  poor R wave progression low voltage Q-wave noted in lead III overall nonspecific ECG    All EKG's are interpreted by the Emergency Department Physician who either signs or Co-signs this chart in the absence of a cardiologist.    EMERGENCY DEPARTMENT COURSE:  ED Course as of Oct 30 1903   Fri Oct 30, 2020   1831 Ef 66% 2/2/20    [BG]      ED Course User Index  [BG] Mayito Houston DO         PROCEDURES:  none    CONSULTS:  None    CRITICAL CARE:  Please see attending note    FINAL IMPRESSION      1. Hyperglycemia          DISPOSITION / PLAN     DISPOSITION  care transferred to dr. Junior Devi at . [facility]:  No follow-up provider specified.     DISCHARGE MEDICATIONS:  New Prescriptions    No medications on file       Mayito Houston DO  Emergency Medicine Resident    (Please note that portions of thisnote were completed with a voice recognition program.  Efforts were made to edit the dictations but occasionally words are mis-transcribed.)       Maria M Desai DO  Resident  10/30/20 1528

## 2020-10-30 NOTE — ED NOTES
Critical Lab Value [address] [facility], [place], operated by [facility], RN  10/30/20 1200

## 2020-10-30 NOTE — TELEPHONE ENCOUNTER
Please Approve or Refuse.   Send to Pharmacy per Pt's Request:      Next Visit Date:  11/4/2020   Last Visit Date: 10/28/2020    Hemoglobin A1C (%)   Date Value   09/14/2020 10.6   02/12/2020 8.4   10/31/2019 8.7 (H)             ( goal A1C is < 7)   BP Readings from Last 3 Encounters:   09/14/20 130/86   07/02/20 (!) 105/46   06/25/20 118/80          (goal 120/80)  BUN   Date Value Ref Range Status   07/02/2020 17 8 - 23 mg/dL Final     CREATININE   Date Value Ref Range Status   07/02/2020 1.02 (H) 0.50 - 0.90 mg/dL Final     Potassium   Date Value Ref Range Status   07/02/2020 4.7 3.7 - 5.3 mmol/L Final

## 2020-10-30 NOTE — ED PROVIDER NOTES
[facility] ED  Emergency Department  Emergency Medicine Resident Sign-out     Care of Tenisha Murphy was assumed from Dr. Eunice Noel and is being seen for Hyperglycemia (BLOOD SUGAR FLUCTUATING FOR 2 DAYS, RECENTLY PLACED ON PREDNISONE FOR BACK PAIN)  . The patient's initial evaluation and plan have been discussed with the prior provider who initially evaluated the patient.      EMERGENCY DEPARTMENT COURSE / MEDICAL DECISION MAKING:       MEDICATIONS GIVEN:  Orders Placed This Encounter   Medications    0.9 % sodium chloride bolus    acetaminophen (TYLENOL) tablet 1,000 mg       LABS / RADIOLOGY:     Labs Reviewed   COMPREHENSIVE METABOLIC PANEL - Abnormal; Notable for the following components:       Result Value    BUN 28 (*)     CREATININE 1.19 (*)     Sodium 133 (*)     Chloride 94 (*)     Total Bilirubin 0.20 (*)     GFR Non- 45 (*)     GFR  54 (*)     All other components within normal limits   URINE RT REFLEX TO CULTURE - Abnormal; Notable for the following components:    Glucose, Ur 3+ (*)     All other components within normal limits   CBC WITH AUTO DIFFERENTIAL - Abnormal; Notable for the following components:    Seg Neutrophils 80 (*)     Lymphocytes 14 (*)     Eosinophils % 0 (*)     Immature Granulocytes 1 (*)     Absolute Lymph # 1.09 (*)     All other components within normal limits   HGB/HCT - Abnormal; Notable for the following components:    POC Hematocrit 47 (*)     All other components within normal limits   SODIUM (POC) - Abnormal; Notable for the following components:    POC Sodium 135 (*)     All other components within normal limits   POTASSIUM (POC) - Abnormal; Notable for the following components:    POC Potassium 5.5 (*)     All other components within normal limits   POC GLUCOSE FINGERSTICK - Abnormal; Notable for the following components:    POC Glucose 414 (*)     All other components within normal limits   VENOUS BLOOD GAS, POINT OF CARE - Abnormal; Notable for the following components:    pH, Onel 7.315 (*)     pCO2, Onel 59.4 (*)     pO2, Onel 57.2 (*)     HCO3, Venous 30.2 (*)     Total CO2, Venous 32 (*)     O2 Sat, Onel 86 (*)     All other components within normal limits   CREATININE W/GFR POINT OF CARE - Abnormal; Notable for the following components:    POC Creatinine 1.32 (*)     GFR Comment 48 (*)     GFR Non- 40 (*)     All other components within normal limits   LACTIC ACID,POINT OF CARE - Abnormal; Notable for the following components:    POC Lactic Acid 2.47 (*)     All other components within normal limits   POCT GLUCOSE - Abnormal; Notable for the following components:    POC Glucose 460 (*)     All other components within normal limits   ANION GAP (CALC) POC - Abnormal; Notable for the following components:    Anion Gap 6 (*)     All other components within normal limits   BETA-HYDROXYBUTYRATE   TROPONIN   CHLORIDE (POC)   CALCIUM, IONIC (POC)   LACTATE, SEPSIS   LACTATE, SEPSIS   POC BLOOD GAS       No results found. RECENT VITALS:     Temp: 99.6 °F (37.6 °C),  Pulse: 78, Resp: 15, BP: (!) 146/63, SpO2: 91 %    This patient is a 78 y.o. Female with chief complaint hyperglycemia in the 500s despite taking insulin, blurred vision. Says she was recently prescribed prednisone for her chronic back pain and since then she has had a very difficult time with her sugars. Also has some nausea but no vomiting, generally feels unwell. Laboratory work initially had an elevated lactate. Give fluid bolus lactate returned to normal.  MI ACS work-up, beta hydroxybutyrate all normal.  Previous ejection fraction on echo was 66%. OUTSTANDING TASKS / RECOMMENDATIONS:    1. Follow-up repeat lactate, hydroxybutyrate: Labs normalized. 2.  Admit versus discharge: Patient says she lives alone, has no help no one that can check in on her. Patient still feeling nauseous, hyperglycemia.   She is concerned about her safety living alone. Patient would benefit from ED observation admission for IV fluids, glucose management, symptomatic control of nausea   FINAL IMPRESSION:     1. Hyperglycemia        DISPOSITION:         DISPOSITION:  []  Discharge   []  Transfer -    [x]  Admission -  obs   []  Against Medical Advice   []  Eloped   FOLLOW-UP: No follow-up provider specified.    DISCHARGE MEDICATIONS: New Prescriptions    No medications on file           Brianda Tinsley MD  Emergency Medicine Resident  [facility]       Brianda Tinsley MD  Resident  10/30/20 5923

## 2020-10-31 LAB
EKG ATRIAL RATE: 66 BPM
EKG P AXIS: 57 DEGREES
EKG P-R INTERVAL: 134 MS
EKG Q-T INTERVAL: 374 MS
EKG QRS DURATION: 70 MS
EKG QTC CALCULATION (BAZETT): 392 MS
EKG R AXIS: -31 DEGREES
EKG T AXIS: 79 DEGREES
EKG VENTRICULAR RATE: 66 BPM
GLUCOSE BLD-MCNC: 204 MG/DL (ref 65–105)
GLUCOSE BLD-MCNC: 208 MG/DL (ref 65–105)
GLUCOSE BLD-MCNC: 232 MG/DL (ref 65–105)
GLUCOSE BLD-MCNC: 86 MG/DL (ref 65–105)

## 2020-10-31 PROCEDURE — 6360000002 HC RX W HCPCS: Performed by: EMERGENCY MEDICINE

## 2020-10-31 PROCEDURE — 6370000000 HC RX 637 (ALT 250 FOR IP): Performed by: STUDENT IN AN ORGANIZED HEALTH CARE EDUCATION/TRAINING PROGRAM

## 2020-10-31 PROCEDURE — 6360000002 HC RX W HCPCS: Performed by: STUDENT IN AN ORGANIZED HEALTH CARE EDUCATION/TRAINING PROGRAM

## 2020-10-31 PROCEDURE — 6370000000 HC RX 637 (ALT 250 FOR IP): Performed by: EMERGENCY MEDICINE

## 2020-10-31 PROCEDURE — 1200000000 HC SEMI PRIVATE

## 2020-10-31 PROCEDURE — 82947 ASSAY GLUCOSE BLOOD QUANT: CPT

## 2020-10-31 PROCEDURE — 93010 ELECTROCARDIOGRAM REPORT: CPT | Performed by: INTERNAL MEDICINE

## 2020-10-31 RX ORDER — ONDANSETRON 2 MG/ML
4 INJECTION INTRAMUSCULAR; INTRAVENOUS EVERY 4 HOURS PRN
Status: DISCONTINUED | OUTPATIENT
Start: 2020-10-31 | End: 2020-11-03 | Stop reason: HOSPADM

## 2020-10-31 RX ORDER — CHOLECALCIFEROL (VITAMIN D3) 125 MCG
5 CAPSULE ORAL NIGHTLY PRN
Status: DISCONTINUED | OUTPATIENT
Start: 2020-10-31 | End: 2020-10-31

## 2020-10-31 RX ORDER — DEXTROSE MONOHYDRATE 25 G/50ML
12.5 INJECTION, SOLUTION INTRAVENOUS PRN
Status: DISCONTINUED | OUTPATIENT
Start: 2020-10-31 | End: 2020-11-03 | Stop reason: HOSPADM

## 2020-10-31 RX ORDER — FUROSEMIDE 20 MG/1
TABLET ORAL
Qty: 30 TABLET | Refills: 3 | Status: SHIPPED | OUTPATIENT
Start: 2020-10-31 | End: 2021-11-24

## 2020-10-31 RX ORDER — OXYCODONE HYDROCHLORIDE AND ACETAMINOPHEN 5; 325 MG/1; MG/1
2 TABLET ORAL EVERY 6 HOURS PRN
Status: DISCONTINUED | OUTPATIENT
Start: 2020-10-31 | End: 2020-11-01

## 2020-10-31 RX ORDER — SODIUM CHLORIDE 0.9 % (FLUSH) 0.9 %
10 SYRINGE (ML) INJECTION PRN
Status: DISCONTINUED | OUTPATIENT
Start: 2020-10-31 | End: 2020-11-03 | Stop reason: HOSPADM

## 2020-10-31 RX ORDER — CALCIUM CITRATE/VITAMIN D3 200MG-6.25
TABLET ORAL
Qty: 100 EACH | Refills: 3 | Status: SHIPPED | OUTPATIENT
Start: 2020-10-31 | End: 2021-01-21 | Stop reason: SDUPTHER

## 2020-10-31 RX ORDER — SODIUM CHLORIDE 0.9 % (FLUSH) 0.9 %
10 SYRINGE (ML) INJECTION EVERY 12 HOURS SCHEDULED
Status: DISCONTINUED | OUTPATIENT
Start: 2020-10-31 | End: 2020-11-03 | Stop reason: HOSPADM

## 2020-10-31 RX ORDER — DEXTROSE MONOHYDRATE 50 MG/ML
100 INJECTION, SOLUTION INTRAVENOUS PRN
Status: DISCONTINUED | OUTPATIENT
Start: 2020-10-31 | End: 2020-11-03 | Stop reason: HOSPADM

## 2020-10-31 RX ORDER — UREA 10 %
5 LOTION (ML) TOPICAL NIGHTLY PRN
Status: DISCONTINUED | OUTPATIENT
Start: 2020-10-31 | End: 2020-11-03 | Stop reason: HOSPADM

## 2020-10-31 RX ORDER — NICOTINE POLACRILEX 4 MG
15 LOZENGE BUCCAL PRN
Status: DISCONTINUED | OUTPATIENT
Start: 2020-10-31 | End: 2020-11-03 | Stop reason: HOSPADM

## 2020-10-31 RX ADMIN — OXYCODONE HYDROCHLORIDE AND ACETAMINOPHEN 2 TABLET: 5; 325 TABLET ORAL at 13:00

## 2020-10-31 RX ADMIN — CLOPIDOGREL 75 MG: 75 TABLET, FILM COATED ORAL at 09:24

## 2020-10-31 RX ADMIN — LOSARTAN POTASSIUM 25 MG: 25 TABLET, FILM COATED ORAL at 09:23

## 2020-10-31 RX ADMIN — MAGNESIUM GLUCONATE 500 MG ORAL TABLET 400 MG: 500 TABLET ORAL at 09:25

## 2020-10-31 RX ADMIN — METFORMIN HYDROCHLORIDE 1000 MG: 500 TABLET ORAL at 09:23

## 2020-10-31 RX ADMIN — INSULIN LISPRO 6 UNITS: 100 INJECTION, SOLUTION INTRAVENOUS; SUBCUTANEOUS at 17:44

## 2020-10-31 RX ADMIN — PANTOPRAZOLE SODIUM 40 MG: 40 TABLET, DELAYED RELEASE ORAL at 20:08

## 2020-10-31 RX ADMIN — GABAPENTIN 300 MG: 300 CAPSULE ORAL at 14:44

## 2020-10-31 RX ADMIN — ASPIRIN 81 MG: 81 TABLET, CHEWABLE ORAL at 09:23

## 2020-10-31 RX ADMIN — INSULIN LISPRO 6 UNITS: 100 INJECTION, SOLUTION INTRAVENOUS; SUBCUTANEOUS at 12:34

## 2020-10-31 RX ADMIN — CARVEDILOL 3.12 MG: 3.12 TABLET, FILM COATED ORAL at 20:08

## 2020-10-31 RX ADMIN — ONDANSETRON 4 MG: 2 INJECTION INTRAMUSCULAR; INTRAVENOUS at 14:49

## 2020-10-31 RX ADMIN — CARVEDILOL 3.12 MG: 3.12 TABLET, FILM COATED ORAL at 09:23

## 2020-10-31 RX ADMIN — Medication 5 MG: at 22:38

## 2020-10-31 RX ADMIN — TOPIRAMATE 100 MG: 100 TABLET, FILM COATED ORAL at 20:08

## 2020-10-31 RX ADMIN — ENOXAPARIN SODIUM 40 MG: 40 INJECTION SUBCUTANEOUS at 09:24

## 2020-10-31 RX ADMIN — PANTOPRAZOLE SODIUM 40 MG: 40 TABLET, DELAYED RELEASE ORAL at 09:23

## 2020-10-31 RX ADMIN — OXYCODONE HYDROCHLORIDE AND ACETAMINOPHEN 2 TABLET: 5; 325 TABLET ORAL at 22:38

## 2020-10-31 RX ADMIN — CITALOPRAM 40 MG: 20 TABLET, FILM COATED ORAL at 09:23

## 2020-10-31 RX ADMIN — GABAPENTIN 300 MG: 300 CAPSULE ORAL at 20:07

## 2020-10-31 RX ADMIN — ENOXAPARIN SODIUM 30 MG: 30 INJECTION SUBCUTANEOUS at 20:07

## 2020-10-31 RX ADMIN — GABAPENTIN 300 MG: 300 CAPSULE ORAL at 09:24

## 2020-10-31 RX ADMIN — MAGNESIUM GLUCONATE 500 MG ORAL TABLET 400 MG: 500 TABLET ORAL at 20:08

## 2020-10-31 RX ADMIN — INSULIN LISPRO 3 UNITS: 100 INJECTION, SOLUTION INTRAVENOUS; SUBCUTANEOUS at 20:08

## 2020-10-31 ASSESSMENT — ENCOUNTER SYMPTOMS
NAUSEA: 1
VOMITING: 0
WHEEZING: 0
COUGH: 0
CHEST TIGHTNESS: 0
SHORTNESS OF BREATH: 1
ABDOMINAL PAIN: 0
BACK PAIN: 1
VOICE CHANGE: 0

## 2020-10-31 ASSESSMENT — PAIN SCALES - GENERAL
PAINLEVEL_OUTOF10: 0
PAINLEVEL_OUTOF10: 9
PAINLEVEL_OUTOF10: 8
PAINLEVEL_OUTOF10: 3

## 2020-10-31 NOTE — ED NOTES
Report taken from [facility]. Care assumed at this time.       Shanthi Jaimes RN  10/30/20 8695

## 2020-10-31 NOTE — ED PROVIDER NOTES
[facility]     Emergency Department     Faculty Attestation    I performed a history and physical examination of the patient and discussed management with the resident. I reviewed the residents note and agree with the documented findings including all diagnostic interpretations and plan of care. Any areas of disagreement are noted on the chart. I was personally present for the key portions of any procedures. I have documented in the chart those procedures where I was not present during the key portions. I have reviewed the emergency nurses triage note. I agree with the chief complaint, past medical history, past surgical history, allergies, medications, social and family history as documented unless otherwise noted below. Documentation of the HPI, Physical Exam and Medical Decision Making performed by scribes is based on my personal performance of the HPI, PE and MDM. For Physician Assistant/ Nurse Practitioner cases/documentation I have personally evaluated this patient and have completed at least one if not all key elements of the E/M (history, physical exam, and MDM). Additional findings are as noted. This patient was evaluated in the Emergency Department for symptoms described in the history of present illness. He/she was evaluated in the context of the global COVID-19 pandemic, which necessitated consideration that the patient might be at risk for infection with the SARS-CoV-2 virus that causes COVID-19. Institutional protocols and algorithms that pertain to the evaluation of patients at risk for COVID-19 are in a state of rapid change based on information released by regulatory bodies including the CDC and federal and state organizations. These policies and algorithms were followed during the patient's care in the ED. Primary Care Physician: Simi Yao MD    History:  This is a 78 y.o. female who presents to the Emergency Department with complaint of hyperglycemia, blurred vision. Has recently been on steroids for back pain. She noticed that her blood sugar has been significantly elevated in the 400s to 500s. No new abdominal pain no vomiting    Physical:     height is 5' 2\" (1.575 m) and weight is 260 lb (117.9 kg). Her oral temperature is 99.6 °F (37.6 °C). Her blood pressure is 146/63 (abnormal) and her pulse is 78. Her respiration is 15 and oxygen saturation is 91%.    78 y.o. female no acute distress, cardiac exam regular rate and rhythm no murmurs rubs gallops, pulmonary clear bilaterally abdomen is soft, nontender.   Skin turgor slightly abnormal    Impression: Hyperglycemia    Plan: Fluids, labs, insulin, reassess    EKG Interpretation  EKG Interpretation    Interpreted by emergency department physician    Rhythm: normal sinus   Rate: normal  Axis: left  Ectopy: none  Conduction: normal  ST Segments: no acute change  T Waves: no acute change  Q Waves: none    EKG  Impression: Nonspecific EKG    Arlyne Gosselin, MD      Interpreted by me          Roxi Duval MD, Mendoza Izaguirre  Attending Emergency Physician         Arlyne Gosselin, MD  10/30/20 7715

## 2020-10-31 NOTE — PROGRESS NOTES
[facility]  CDU / OBSERVATION eNCOUnter  Attending NOte       I performed a history and physical examination of the patient and discussed management with the resident. I reviewed the residents note and agree with the documented findings and plan of care. Any areas of disagreement are noted on the chart. I was personally present for the key portions of any procedures. I have documented in the chart those procedures where I was not present during the key portions. I have reviewed the nurses notes. I agree with the chief complaint, past medical history, past surgical history, allergies, medications, social and family history as documented unless otherwise noted below. The Family history, social history, and ROS are effectively unchanged since admission unless noted elsewhere in the chart. Patient with ongoing need for glucose monitoring. Some nausea now. Concern over patient's ability to handle p.o. and continue to monitor sugars. Will hold patient overnight for further glucose monitoring. Patient has some nausea now. Will give Zofran. Patient will have glucose monitoring strips sent to bedside.   Probable discharge tomorrow once nausea resolves and patient is able to reliably handle p.o. with normal sugars    Viviane Darnell MD  Attending Emergency  Physician

## 2020-10-31 NOTE — PLAN OF CARE
Problem: Falls - Risk of:  Goal: Will remain free from falls  Description: Will remain free from falls  10/31/2020 1949 by Chris Marshall RN  Outcome: Ongoing  10/31/2020 1656 by Darion Hoyt RN  Outcome: Ongoing  Goal: Absence of physical injury  Description: Absence of physical injury  10/31/2020 1949 by Chris Marshall RN  Outcome: Ongoing  10/31/2020 1656 by Darion Hoyt RN  Outcome: Ongoing

## 2020-10-31 NOTE — H&P
[facility]  CDU / OBSERVATION ENCOUNTER  RESIDENT NOTE     Pt Name: Alan Lai  MRN: [id number]  Rozinagfceline 1950  Date of evaluation: 10/31/20  Patient's PCP is :  Rancho Cardoza MD    [address] [place] [address]       Chief Complaint   Patient presents with    Hyperglycemia     BLOOD SUGAR FLUCTUATING FOR 2 DAYS, RECENTLY PLACED ON PREDNISONE FOR BACK PAIN         HISTORY OF PRESENT ILLNESS    Alan Lai is a 78 y.o. female who presents with blurred vision and hyperglycemia x2 days. Patient reports her glucose has been in the 400-500 range for the past 2 days. She developed blurred vision yesterday, and sought medical care for that reason. She usually takes 50 units of Lantus in the morning, Metformin, and NovoLog as needed with meals. Her baseline blood glucose is 120-180 in the morning. On arrival to the ED yesterday afternoon, blood glucose was 420. She recently was prescribed steroids for chronic low back pain, believes this is the source of her high blood sugar as it has happened before. Patient received insulin overnight, and blood glucose this morning was 86 just before her transfer to the floor. Patient is currently feeling dizzy with mild nausea and states she is not usually this low. She is hungry and would like to eat in order to feel better. Blurred vision has resolved. No headaches, chest pain, new shortness of breath, abdominal pain, vomiting, difficulty urinating, other complaints at this time. Location/Symptom: Hyperglycemia, blurred vision  Timing/Onset: 2 days prior to admission  Provocation: Possible steroid use  Quality: N/A  Radiation: N/A  Severity: Mild blurred vision  Timing/Duration: Persistent x2 days  Modifying Factors: None    REVIEW OF SYSTEMS       Review of Systems   Constitutional: Negative for chills and fever. HENT: Negative for congestion and voice change. Eyes: Positive for visual disturbance.    Respiratory: Positive for shortness has a past surgical history that includes Cholecystectomy (); Carpal tunnel release (Right); Tympanoplasty; Dilation & curettage (); Cystocopy (2013); Cataract removal with implant (Bilateral); Tonsillectomy; Incontinence surgery; ablation of dysrhythmic focus (); Upper gastrointestinal endoscopy (2016); eye surgery; Tubal ligation; other surgical history (09/10/15); Insertable Cardiac Monitor (2015); Tympanoplasty; Colonoscopy; Colonoscopy (04/10/2017); pr colsc flx w/removal lesion by hot bx forceps (N/A, 4/10/2017); and Tympanostomy tube placement (2017). I have reviewed and agree with Surgical History entered and it is not pertinent to this complaint. CURRENT MEDICATIONS     aspirin chewable tablet 81 mg, Daily  carvedilol (COREG) tablet 3.125 mg, BID  citalopram (CELEXA) tablet 40 mg, Daily  clopidogrel (PLAVIX) tablet 75 mg, Daily  gabapentin (NEURONTIN) capsule 300 mg, TID  losartan (COZAAR) tablet 25 mg, Daily  magnesium oxide (MAG-OX) tablet 400 mg, BID  metFORMIN (GLUCOPHAGE) tablet 1,000 mg, Daily with breakfast  pantoprazole (PROTONIX) tablet 40 mg, BID  topiramate (TOPAMAX) tablet 100 mg, Nightly  acetaminophen (TYLENOL) tablet 650 mg, Q4H PRN  enoxaparin (LOVENOX) injection 40 mg, Daily  insulin lispro (HUMALOG) injection vial 0-18 Units, TID WC  insulin lispro (HUMALOG) injection vial 0-9 Units, Nightly  0.9 % sodium chloride infusion, Continuous        All medication charted and reviewed. ALLERGIES     is allergic to robitussin [guaifenesin]; codeine; compazine [prochlorperazine maleate]; iodides; morphine; moxifloxacin; reglan [metoclopramide]; avelox [moxifloxacin hcl in nacl]; cipro xr; and sulfa antibiotics. FAMILY HISTORY     She indicated that her mother is . She indicated that her father is . She indicated that her sister is .  She indicated that the status of her maternal grandmother is unknown.     family history includes Diabetes in her maternal grandmother and mother; Emphysema in her sister; Heart Disease in her mother; Stomach Cancer in her father. The patient denies any pertinent family history. I have reviewed and agree with the family history entered. I have reviewed the Family History and it is not significant to the case    SOCIAL HISTORY      reports that she quit smoking about 20 years ago. Her smoking use included cigarettes. She has a 123.00 pack-year smoking history. She has never used smokeless tobacco. She reports that she does not drink alcohol or use drugs. I have reviewed and agree with all Social.  There are no concerns for substance abuse/use. PHYSICAL EXAM     INITIAL VITALS:  height is 5' 2\" (1.575 m) and weight is 260 lb (117.9 kg). Her oral temperature is 99.6 °F (37.6 °C). Her blood pressure is 93/47 (abnormal) and her pulse is 62. Her respiration is 18 and oxygen saturation is 94%. Physical Exam  Constitutional:       General: She is not in acute distress. Appearance: Normal appearance. She is obese. She is not ill-appearing or diaphoretic. HENT:      Head: Normocephalic and atraumatic. Mouth/Throat:      Mouth: Mucous membranes are moist.      Pharynx: Oropharynx is clear. Eyes:      Extraocular Movements: Extraocular movements intact. Pupils: Pupils are equal, round, and reactive to light. Neck:      Musculoskeletal: Normal range of motion and neck supple. Cardiovascular:      Rate and Rhythm: Normal rate and regular rhythm. Heart sounds: Normal heart sounds. No murmur. Pulmonary:      Effort: Pulmonary effort is normal.      Breath sounds: Normal breath sounds. Comments: 2 L NC oxygen  Abdominal:      General: There is no distension. Palpations: Abdomen is soft. Tenderness: There is no abdominal tenderness. Musculoskeletal: Normal range of motion. General: No swelling. Skin:     General: Skin is warm and dry.    Neurological: General: No focal deficit present. Mental Status: She is alert and oriented to person, place, and time. Cranial Nerves: No cranial nerve deficit. Motor: No weakness. Psychiatric:         Mood and Affect: Mood normal.         Behavior: Behavior normal.             DIFFERENTIAL DIAGNOSIS/MDM:     DDx: Hyperglycemia    DIAGNOSTIC RESULTS     EKG: All EKG's are interpreted by the Observation Physician who either signs or Co-signs this chart in the absence of a cardiologist.    EKG Interpretation    Interpreted by observation physician    Rhythm: normal sinus   Rate: normal  Axis: left  Ectopy: none  Conduction: normal  ST Segments: no acute change  T Waves: no acute change  Q Waves: none    Clinical Impression: non-specific EKG    Marina Ca DO      RADIOLOGY:   I directly visualized the following  images and reviewed the radiologist interpretations:    Xr Chest (2 Vw)    Result Date: 10/30/2020  EXAMINATION: TWO XRAY VIEWS OF THE CHEST 10/30/2020 7:07 pm COMPARISON: Chest two views June 30, 2020 HISTORY: ORDERING SYSTEM PROVIDED HISTORY: Hyperglycemia eval for infectious etiology TECHNOLOGIST PROVIDED HISTORY: Hyperglycemia eval for infectious etiology FINDINGS: The heart is normal in size and configuration. The mediastinal contours are within normal limits. The lungs are well aerated. The pleural surfaces are normal and no evidence of a pleural effusion is seen. Bones and soft tissues are unremarkable. Unremarkable single upright portable AP view of the chest.       LABS:  I have reviewed and interpreted all available lab results.   Labs Reviewed   COMPREHENSIVE METABOLIC PANEL - Abnormal; Notable for the following components:       Result Value    Glucose 420 (*)     BUN 28 (*)     CREATININE 1.19 (*)     Sodium 133 (*)     Chloride 94 (*)     Total Bilirubin 0.20 (*)     GFR Non- 45 (*)     GFR  54 (*)     All other components within normal limits URINE RT REFLEX TO CULTURE - Abnormal; Notable for the following components:    Glucose, Ur 3+ (*)     All other components within normal limits   LACTATE, SEPSIS - Abnormal; Notable for the following components:    Lactic Acid, Sepsis, Whole Blood 2.8 (*)     All other components within normal limits   CBC WITH AUTO DIFFERENTIAL - Abnormal; Notable for the following components:    Seg Neutrophils 80 (*)     Lymphocytes 14 (*)     Eosinophils % 0 (*)     Immature Granulocytes 1 (*)     Absolute Lymph # 1.09 (*)     All other components within normal limits   HGB/HCT - Abnormal; Notable for the following components:    POC Hematocrit 47 (*)     All other components within normal limits   SODIUM (POC) - Abnormal; Notable for the following components:    POC Sodium 135 (*)     All other components within normal limits   POTASSIUM (POC) - Abnormal; Notable for the following components:    POC Potassium 5.5 (*)     All other components within normal limits   POC GLUCOSE FINGERSTICK - Abnormal; Notable for the following components:    POC Glucose 414 (*)     All other components within normal limits   VENOUS BLOOD GAS, POINT OF CARE - Abnormal; Notable for the following components:    pH, Onel 7.315 (*)     pCO2, Onel 59.4 (*)     pO2, Onel 57.2 (*)     HCO3, Venous 30.2 (*)     Total CO2, Venous 32 (*)     O2 Sat, Onel 86 (*)     All other components within normal limits   CREATININE W/GFR POINT OF CARE - Abnormal; Notable for the following components:    POC Creatinine 1.32 (*)     GFR Comment 48 (*)     GFR Non- 40 (*)     All other components within normal limits   LACTIC ACID,POINT OF CARE - Abnormal; Notable for the following components:    POC Lactic Acid 2.47 (*)     All other components within normal limits   POCT GLUCOSE - Abnormal; Notable for the following components:    POC Glucose 460 (*)     All other components within normal limits   ANION GAP (CALC) POC - Abnormal; Notable for the following components:    Anion Gap 6 (*)     All other components within normal limits   POC GLUCOSE FINGERSTICK - Abnormal; Notable for the following components:    POC Glucose 175 (*)     All other components within normal limits   BETA-HYDROXYBUTYRATE   LACTATE, SEPSIS   TROPONIN   CHLORIDE (POC)   CALCIUM, IONIC (POC)   POC BLOOD GAS       SCREENING TOOLS:    HEART Risk Score for Chest Pain Patients   History and Physical Exam Suspicion Level  (Nausea, Vomiting, Diaphoresis, Radiation, Exertion)   Slightly Suspicious (0 pts)   Moderately Suspicious (1 pt)   Highly Suspicious (2 pts)   EKG Interpretation   Normal (0 pts)   Non-Specific Repolarization Disturbance (1 pt)   Significant ST-Depression (2 pts)   Age of Patient (in years)   = 39 (0 pts)   46-64 (1 pt)   = 65 (2 pts)   Risk Factors   No Risk Factors (0 pts)   1-2 Risk Factors (1 pt)   = 3 Risk Factors (2 pts)   Risk Factors Include:   Hypercholesterolemia   Hypertension   Diabetes Mellitus   Cigarette smoking   Positive family history   Obesity   CAD   (SLE, CKDz, HIV, Cocaine abuse)   Troponin Levels   = Normal Limit (0 pts)   1-3 Times Normal Limit (1 pt)   > 3 Times Normal Limit (2 pts)  TOTAL:    Percent Risk for Major Adverse Cardiac Event (MACE)  0-3 pts indicates low risk for MACE   2.5% (DISCHARGE)   4-7 pts indicates moderate risk for MACE  20.3% (OBS)  8-10 pts indicates high risk for MACE  72.7% (EARLY INVASIVE TX)    CDU MEETA / Maureen Mcardle is a 78 y.o. female who presents with hyperglycemia and blurred vision x2 days. Significant past medical history includes type 2 diabetes, CAD, CHF, hypertension, CKD stage III, chronic low back pain, HIGINIO, GERD. Patient received insulin in the ED, dropping blood glucose to normal levels. 1. Symptomatic acute hyperglycemia, likely due to recent steroid use  · Blood glucose at normal level this a.m.   · Advance diet as tolerated  · Monitor glucose today  · Restart home

## 2020-10-31 NOTE — ED NOTES
Patient arrived per EMS. States Blood sugar flutcuating for 2 days from the 300's to the 500's. Does state was placed on prednisone to help with her chronic back pain 2 days ago. No respiratory distress noted.   C/O headache and blurry vision R/T elevated B S  Denies N/V/D     Cyn Washington RN  10/30/20 1781

## 2020-10-31 NOTE — FLOWSHEET NOTE
Assessment: Patient was awake and oriented when  visited. Family was not present at the time. Patient seemed to be having a rough time. When asked how she was feeling, patient responded; \"my blood sugar keeps going up. \" Patient was raised [employer].  Intervention:  maintained listening presence, offered support, prayed with patient and reassured her that she was in good hands. Outcome: Patient expressed appreciative for the spiritual support she received. Follow up visits recommended for more prayers and support. 10/31/20 1037   Encounter Summary   Services provided to: Patient   Support System Family members   Place of [address] Visiting   (10/31/2020)   Complexity of Encounter Moderate   Length of Encounter 30 minutes   Spiritual Assessment Completed Yes   Routine   Type Initial   Assessment Calm; Approachable; Hopeful   Intervention Active listening;Nurtured hope;Prayer;[place];Empowerment   Outcome Expressed gratitude   Spiritual/Latter day   Type Spiritual support

## 2020-10-31 NOTE — CARE COORDINATION
Case Management Initial Discharge Plan  Bhavana Cain,             Met with:patient to discuss discharge plans. Information verified: address, contacts, phone number, , insurance Yes    Emergency Contact/Next of Kin name & number: Wilton Richey, son [phone]    PCP: Deborah Torrez MD  Date of last visit: few days ago    Insurance Provider: Abigail Freeman    Discharge Planning    Living Arrangements:  Alone   Support Systems:  Friends/Neighbors    Home has mu;tiple stories ([address])  0 stairs to climb to get into front door, 0stairs to climb to reach second floor - elevator  Location of bedroom/bathroom in home  - one level apt    Patient able to perform ADL's:Assisted    Current Services (outpatient & in home) Marisol CARR (HHA/RN), [employer], Mobile Meals (14 per week)  DME equipment: O2, Cpap, walker, electric scooter, shower seat, tub rails  DME provider: Sameer Vazquez    Receiving oral anticoagulation therapy? Yes    If indicated:   Physician managing anticoagulation treatment: PCP  Where does patient obtain lab work for ATC treatment? N/A      Potential Assistance Needed:  Cont with HC    Patient agreeable to home care: Yes  Freedom of choice provided:  yes    Prior SNF/Rehab Placement and Facility: [address] [place] [address] Rd to SNF/Rehab: No  [place] of choice provided: n/a     Evaluation: n/a    Expected Discharge date:  20    Patient expects to be discharged to:  home  Follow Up Appointment: Best Day/ Time: Monday AM    Transportation provider: Chuy Yancey  Transportation arrangements needed for discharge: Yes     Readmission Risk              Risk of Unplanned Readmission:        27             Does patient have a readmission risk score greater than 14?: Yes  If yes, follow-up appointment must be made within 7 days of discharge.      Goals of Care:       Discharge Plan: Pt will return [facility] with [facility]  Stated has HHA 3x week, 3hrs/day  RN comes monthly  Pt has Cory and receives Cenify (14 meals/week)  Called Dionisio Sprinkles [facility] Klinefel[facility] and spoke with Flavio to advise of admisison            Electronically signed by Maia Hdez on 10/31/20 at 11:31 AM EDT

## 2020-10-31 NOTE — ED NOTES
Pt back to bed. Pt resting in bed, RR even and unlabored, A&Ox4. No needs expressed at this time. Will continue to monitor.      Radha Paul RN  10/31/20 6217

## 2020-10-31 NOTE — ED NOTES
Pt resting on stretcher, no respiratory distress noted, pt updated on plan of care, will continue to monitor, call light in reach.        Mariela Wallace RN  10/31/20 9217

## 2020-10-31 NOTE — ED NOTES
Pt resting on stretcher, no respiratory distress noted, pt updated on plan of care, will continue to monitor, call light in reach.        Williams Jenkins, RN  10/31/20 1485

## 2020-10-31 NOTE — ED NOTES
Pt calls out for assistance with IV while getting to the bedside commode. NAD noted.       Kimberly Ellsworth RN  10/31/20 3479

## 2020-11-01 PROBLEM — R06.03 ACUTE RESPIRATORY DISTRESS: Status: ACTIVE | Noted: 2020-11-01

## 2020-11-01 PROBLEM — J96.22 ACUTE ON CHRONIC RESPIRATORY FAILURE WITH HYPOXIA AND HYPERCAPNIA (HCC): Status: ACTIVE | Noted: 2018-02-06

## 2020-11-01 LAB
ACTION: NORMAL
ALLEN TEST: ABNORMAL
ALLEN TEST: POSITIVE
ALLEN TEST: POSITIVE
ANION GAP SERPL CALCULATED.3IONS-SCNC: 9 MMOL/L (ref 9–17)
BUN BLDV-MCNC: 21 MG/DL (ref 8–23)
BUN/CREAT BLD: ABNORMAL (ref 9–20)
CALCIUM SERPL-MCNC: 8.4 MG/DL (ref 8.6–10.4)
CARBOXYHEMOGLOBIN: 0.3 % (ref 0–5)
CHLORIDE BLD-SCNC: 102 MMOL/L (ref 98–107)
CO2: 26 MMOL/L (ref 20–31)
CREAT SERPL-MCNC: 1.23 MG/DL (ref 0.5–0.9)
DATE AND TIME: NORMAL
FIO2: 30
FIO2: 50
FIO2: ABNORMAL
GFR AFRICAN AMERICAN: 52 ML/MIN
GFR NON-AFRICAN AMERICAN: 43 ML/MIN
GFR SERPL CREATININE-BSD FRML MDRD: ABNORMAL ML/MIN/{1.73_M2}
GFR SERPL CREATININE-BSD FRML MDRD: ABNORMAL ML/MIN/{1.73_M2}
GLUCOSE BLD-MCNC: 120 MG/DL (ref 65–105)
GLUCOSE BLD-MCNC: 127 MG/DL (ref 65–105)
GLUCOSE BLD-MCNC: 147 MG/DL (ref 65–105)
GLUCOSE BLD-MCNC: 163 MG/DL (ref 65–105)
GLUCOSE BLD-MCNC: 172 MG/DL (ref 70–99)
GLUCOSE BLD-MCNC: 330 MG/DL (ref 65–105)
GLUCOSE BLD-MCNC: 369 MG/DL (ref 65–105)
HCO3 VENOUS: 29.2 MMOL/L (ref 24–30)
METHEMOGLOBIN: ABNORMAL % (ref 0–1.5)
MODE: ABNORMAL
NEGATIVE BASE EXCESS, ART: 1 (ref 0–2)
NEGATIVE BASE EXCESS, ART: 1 (ref 0–2)
NEGATIVE BASE EXCESS, VEN: 2.5 MMOL/L (ref 0–2)
NOTIFICATION TIME: ABNORMAL
NOTIFICATION: ABNORMAL
NOTIFY: NORMAL
O2 DEVICE/FLOW/%: ABNORMAL
O2 SAT, VEN: 65.7 % (ref 60–85)
OXYHEMOGLOBIN: ABNORMAL % (ref 95–98)
PATIENT TEMP: 37
PATIENT TEMP: ABNORMAL
PATIENT TEMP: ABNORMAL
PCO2, VEN, TEMP ADJ: ABNORMAL MMHG (ref 39–55)
PCO2, VEN: 92 (ref 39–55)
PEEP/CPAP: ABNORMAL
PH VENOUS: 7.13 (ref 7.32–7.42)
PH, VEN, TEMP ADJ: ABNORMAL (ref 7.32–7.42)
PO2, VEN, TEMP ADJ: ABNORMAL MMHG (ref 30–50)
PO2, VEN: 34.9 (ref 30–50)
POC HCO3: 28.9 MMOL/L (ref 21–28)
POC HCO3: 30.8 MMOL/L (ref 21–28)
POC O2 SATURATION: 86 % (ref 94–98)
POC O2 SATURATION: 98 % (ref 94–98)
POC PCO2 TEMP: ABNORMAL MM HG
POC PCO2 TEMP: ABNORMAL MM HG
POC PCO2: 71.4 MM HG (ref 35–48)
POC PCO2: 91 MM HG (ref 35–48)
POC PH TEMP: ABNORMAL
POC PH TEMP: ABNORMAL
POC PH: 7.14 (ref 7.35–7.45)
POC PH: 7.22 (ref 7.35–7.45)
POC PO2 TEMP: ABNORMAL MM HG
POC PO2 TEMP: ABNORMAL MM HG
POC PO2: 146.1 MM HG (ref 83–108)
POC PO2: 63.9 MM HG (ref 83–108)
POSITIVE BASE EXCESS, ART: ABNORMAL (ref 0–3)
POSITIVE BASE EXCESS, ART: ABNORMAL (ref 0–3)
POSITIVE BASE EXCESS, VEN: ABNORMAL MMOL/L (ref 0–2)
POTASSIUM SERPL-SCNC: 5 MMOL/L (ref 3.7–5.3)
PSV: ABNORMAL
PT. POSITION: ABNORMAL
READ BACK: YES
RESPIRATORY RATE: ABNORMAL
SAMPLE SITE: ABNORMAL
SET RATE: ABNORMAL
SODIUM BLD-SCNC: 137 MMOL/L (ref 135–144)
TCO2 (CALC), ART: 31 MMOL/L (ref 22–29)
TCO2 (CALC), ART: 34 MMOL/L (ref 22–29)
TEXT FOR RESPIRATORY: ABNORMAL
TOTAL HB: ABNORMAL G/DL (ref 12–16)
TOTAL RATE: ABNORMAL
VT: ABNORMAL

## 2020-11-01 PROCEDURE — 99291 CRITICAL CARE FIRST HOUR: CPT | Performed by: INTERNAL MEDICINE

## 2020-11-01 PROCEDURE — 6360000002 HC RX W HCPCS: Performed by: EMERGENCY MEDICINE

## 2020-11-01 PROCEDURE — 2580000003 HC RX 258: Performed by: STUDENT IN AN ORGANIZED HEALTH CARE EDUCATION/TRAINING PROGRAM

## 2020-11-01 PROCEDURE — 36600 WITHDRAWAL OF ARTERIAL BLOOD: CPT

## 2020-11-01 PROCEDURE — 6370000000 HC RX 637 (ALT 250 FOR IP): Performed by: EMERGENCY MEDICINE

## 2020-11-01 PROCEDURE — 94660 CPAP INITIATION&MGMT: CPT

## 2020-11-01 PROCEDURE — 2060000000 HC ICU INTERMEDIATE R&B

## 2020-11-01 PROCEDURE — 93005 ELECTROCARDIOGRAM TRACING: CPT | Performed by: INTERNAL MEDICINE

## 2020-11-01 PROCEDURE — 93005 ELECTROCARDIOGRAM TRACING: CPT | Performed by: EMERGENCY MEDICINE

## 2020-11-01 PROCEDURE — 36415 COLL VENOUS BLD VENIPUNCTURE: CPT

## 2020-11-01 PROCEDURE — 82803 BLOOD GASES ANY COMBINATION: CPT

## 2020-11-01 PROCEDURE — 82805 BLOOD GASES W/O2 SATURATION: CPT

## 2020-11-01 PROCEDURE — 80048 BASIC METABOLIC PNL TOTAL CA: CPT

## 2020-11-01 PROCEDURE — 82947 ASSAY GLUCOSE BLOOD QUANT: CPT

## 2020-11-01 PROCEDURE — 94761 N-INVAS EAR/PLS OXIMETRY MLT: CPT

## 2020-11-01 PROCEDURE — 6370000000 HC RX 637 (ALT 250 FOR IP): Performed by: STUDENT IN AN ORGANIZED HEALTH CARE EDUCATION/TRAINING PROGRAM

## 2020-11-01 RX ORDER — OXYCODONE HYDROCHLORIDE AND ACETAMINOPHEN 5; 325 MG/1; MG/1
1 TABLET ORAL EVERY 6 HOURS PRN
Status: DISCONTINUED | OUTPATIENT
Start: 2020-11-01 | End: 2020-11-03 | Stop reason: HOSPADM

## 2020-11-01 RX ORDER — NALOXONE HYDROCHLORIDE 0.4 MG/ML
INJECTION, SOLUTION INTRAMUSCULAR; INTRAVENOUS; SUBCUTANEOUS DAILY PRN
Status: COMPLETED | OUTPATIENT
Start: 2020-11-01 | End: 2020-11-01

## 2020-11-01 RX ORDER — INSULIN GLARGINE 100 [IU]/ML
30 INJECTION, SOLUTION SUBCUTANEOUS DAILY
Status: DISCONTINUED | OUTPATIENT
Start: 2020-11-01 | End: 2020-11-03 | Stop reason: HOSPADM

## 2020-11-01 RX ORDER — NALOXONE HYDROCHLORIDE 0.4 MG/ML
0.4 INJECTION, SOLUTION INTRAMUSCULAR; INTRAVENOUS; SUBCUTANEOUS PRN
Status: DISCONTINUED | OUTPATIENT
Start: 2020-11-01 | End: 2020-11-01

## 2020-11-01 RX ORDER — NALOXONE HYDROCHLORIDE 1 MG/ML
1 INJECTION INTRAMUSCULAR; INTRAVENOUS; SUBCUTANEOUS PRN
Status: DISCONTINUED | OUTPATIENT
Start: 2020-11-01 | End: 2020-11-03 | Stop reason: HOSPADM

## 2020-11-01 RX ADMIN — NALOXONE HYDROCHLORIDE 2 MG: 0.4 INJECTION, SOLUTION INTRAMUSCULAR; INTRAVENOUS; SUBCUTANEOUS at 07:50

## 2020-11-01 RX ADMIN — SODIUM CHLORIDE: 9 INJECTION, SOLUTION INTRAVENOUS at 10:36

## 2020-11-01 RX ADMIN — OXYCODONE HYDROCHLORIDE AND ACETAMINOPHEN 2 TABLET: 5; 325 TABLET ORAL at 05:58

## 2020-11-01 RX ADMIN — INSULIN LISPRO 12 UNITS: 100 INJECTION, SOLUTION INTRAVENOUS; SUBCUTANEOUS at 09:28

## 2020-11-01 RX ADMIN — SODIUM CHLORIDE: 9 INJECTION, SOLUTION INTRAVENOUS at 19:53

## 2020-11-01 ASSESSMENT — PAIN SCALES - GENERAL: PAINLEVEL_OUTOF10: 8

## 2020-11-01 NOTE — PROGRESS NOTES
OBS/CDU   RESIDENT NOTE      Patients PCP is:  Nereida Chavarria MD        SUBJECTIVE      Rapid response called this am. Patient was unresponsive with saturation of 74%. Patient transferred to medicine team for further workup and management. The patient is urinating on her own and is passing flatus. Denies fever, chills, nausea, vomiting, chest pain, shortness of breath, abdominal pain, focal weakness, numbness, tingling, urinary/bowel symptoms, vision changes, visual hallucinations, or headache. PHYSICAL EXAM      General: NAD, AO X 3  Heent: EMOI, PERRL  Neck: SUPPLE, NO JVD  Cardiovascular: RRR, S1S2  Pulmonary: CTAB, NO SOB  Abdomen: SOFT, NTTP, ND, +BS  Extremities: +2/4 PULSES DISTAL, NO SWELLING  Neuro / Psych: NO NUMBNESS OR TINGLING, MENTATION AT BASELINE    PERTINENT TEST /EXAMS      I have reviewed all available laboratory results.     MEDICATIONS CURRENT   sodium chloride flush 0.9 % injection 10 mL, 2 times per day  sodium chloride flush 0.9 % injection 10 mL, PRN  oxyCODONE-acetaminophen (PERCOCET) 5-325 MG per tablet 2 tablet, Q6H PRN  ondansetron (ZOFRAN) injection 4 mg, Q4H PRN  glucose (GLUTOSE) 40 % oral gel 15 g, PRN  dextrose 50 % IV solution, PRN  glucagon (rDNA) injection 1 mg, PRN  dextrose 5 % solution, PRN  enoxaparin (LOVENOX) injection 30 mg, BID  melatonin tablet 5 mg, Nightly PRN  aspirin chewable tablet 81 mg, Daily  carvedilol (COREG) tablet 3.125 mg, BID  citalopram (CELEXA) tablet 40 mg, Daily  clopidogrel (PLAVIX) tablet 75 mg, Daily  gabapentin (NEURONTIN) capsule 300 mg, TID  losartan (COZAAR) tablet 25 mg, Daily  magnesium oxide (MAG-OX) tablet 400 mg, BID  metFORMIN (GLUCOPHAGE) tablet 1,000 mg, Daily with breakfast  pantoprazole (PROTONIX) tablet 40 mg, BID  topiramate (TOPAMAX) tablet 100 mg, Nightly  acetaminophen (TYLENOL) tablet 650 mg, Q4H PRN  insulin lispro (HUMALOG) injection vial 0-18 Units, TID WC  insulin lispro (HUMALOG) injection vial 0-9 Units, Nightly  0.9 % sodium chloride infusion, Continuous        All medication charted and reviewed. CONSULTS      None    ASSESSMENT/PLAN       Natasha Doston is a 78 y.o. female who presents with hyperglycemia and blurred vision x2 days. Significant past medical history includes type 2 diabetes, CAD, CHF, hypertension, CKD stage III, chronic low back pain, HIGINIO, GERD. Patient received insulin in the ED, dropping blood glucose to normal levels.     1. Symptomatic acute hyperglycemia, likely due to recent steroid use  · Blood glucose at normal level this a.m. · Advance diet as tolerated  · Monitor glucose today  · Restart home medications  · Continue insulin sliding scale  · Patient with continued blurred vision and nausea today - will continue to monitor overnight for improvement of BG and PO tolerance   · Patient transferred to medicine team for further management after rapid response this am.      · Continue home medications and pain control  · Monitor vitals, labs, and imaging  · DISPO: pending clinical improvement, likely discharge home tomorrow if symptoms improve    --  Gerhard Brooks  Emergency Medicine Resident Physician     This dictation was generated by voice recognition computer software. Although all attempts are made to edit the dictation for accuracy, there may be errors in the transcription that are not intended.

## 2020-11-01 NOTE — FLOWSHEET NOTE
SPIRITUAL CARE DEPARTMENT - [facility]  PROGRESS NOTE    Shift date: 10/31/2020  Shift day: Saturday   Shift # 3    Room # 0317/0317-01   Name: Marie Dominguez            Age: 78 y.o. Gender: female          Confucianism: [address] [place] [address] of Rastafari:     Referral: Rapid Response    Admit Date & Time: 10/30/2020  5:13 PM    PATIENT/EVENT DESCRIPTION:  Marie Dominguez is a 78 y.o. female   Patient reportedly had an episode of low blood pressure, possibly low oxygen with sugar level issues. [facility] SPIRITUAL ASSESSMENT/INTERVENTION:  Shante Martin was delayed getting to the bedside for a few minutes as the room was filled with staff.  inquired of patient if she wanted family called, and she replied \"Not now. \"   Shante Martin offered a prayer which the patient received. SPIRITUAL CARE FOLLOW-UP PLAN:  Chaplains will remain available to offer spiritual and emotional support as needed. Electronically signed by Marnie Nice on 11/1/2020 at 8:18 AM.  [facility]  [phone]                 11/01/20 0740   Encounter Summary   Services provided to: Patient   Referral/Consult From: Nursing Supervisor/Manager  (RRT)   Support System Family members   [facility]   Continue Visiting   (11/01/2020)   Complexity of Encounter Moderate   Length of Encounter 30 minutes   Spiritual Assessment Completed Yes   Crisis   Type Rapid response   Assessment Approachable; Anxious; Spiritual struggle;Coping   Intervention Active listening;Nurtured hope;Prayer;Sustaining presence/ Ministry of presence   Outcome Acceptance;Comfort; Less anxious, less agitated

## 2020-11-01 NOTE — PROGRESS NOTES
CDU Daily Progress Note  Attending Physician       Pt Name: Marie Dominguez  MRN: [id number]  Rozinagfceline 1950  Date of evaluation: 11/1/20    I performed a history and physical examination of the patient and discussed management with the resident. I reviewed the residents note and agree with the documented findings and plan of care. Any areas of disagreement are noted on the chart. I was personally present for the key portions of any procedures. I have documented in the chart those procedures where I was not present during the key portions. I have reviewed the emergency nurses triage note. I agree with the chief complaint, past medical history, past surgical history, allergies, medications, social and family history as documented unless otherwise noted below. Documentation of the HPI, Physical Exam and Medical Decision Making performed by medical students or scribes is based on my personal performance of the HPI, PE and MDM. For Physician Assistant/ Nurse Practitioner cases/documentation I have personally evaluated this patient and have completed at least one if not all key elements of the E/M (history, physical exam, and MDM). Additional findings are as noted. The Family History, Social History and Review of Systems are unchanged from the previous day. No significant events overnight. Found unresponsive today by nurse, rapid response called, BS 300s, SaO2 went down to 74 she had received percocet one hour prior, she was given narcan which woke her up and SaO2 wnt to over 90. Over next few hours she was sleepy, but responded to voice, oriented to time place and person. Because she continues to be sleepy, will do venous blood gases (?CO2 retaining) and admit to medicine on step down. ECG nonspecific. Taking prednisone recently for exacerbation of chronic back pain. BS increased to 500, had some blurred vision. Developed some nausea in ED.  PMHx: a fib, DM, HTN, dyslipidemia, CVA, COPD, GERD, CKD, DDD w chronic back pain, CHF. BS today 209. Creat 1.19 BUN 28 yesterday.  Will recheck today after fluids overnight     Amber Mott MD  Attending Physician  Critical Decision Unit

## 2020-11-01 NOTE — PROGRESS NOTES
Pt's son updated via telephone of pt's respiratory status and possibility of intubation if pt's respiratory status continues to decline. Son verbalized understanding and stated no further questions.

## 2020-11-01 NOTE — PROGRESS NOTES
Arrived at rapid response, patient being assisted with breathing with bmv. Narcan admistered. ,  patient became more responsive, withdrew bmv. Placed on 4lpm nasal cannula. SPO2 94%. BIPAP at bedside.

## 2020-11-01 NOTE — H&P
[address]     Department of Internal Medicine - Staff Internal Medicine Teaching Service          ADMISSION NOTE/HISTORY AND PHYSICAL EXAMINATION    Date: 11/1/2020  Patient Name: Carrie Lomeli  Date of admission: 10/30/2020  5:13 PM  YOB: 1950  PCP: Corbin Serrano MD  History Obtained From: Patient, Electronic medical record    CHIEF COMPLAINT     Chief complaint: Blurred vision, shortness of breath, hyperglycemia    HISTORY OF PRESENTING ILLNESS     The patient is a pleasant 78 y.o. female with PMH of uncontrolled DM type II, HTN, CKD stage III, COPD, HIGINIO, obesity presents with a chief complaint of blurred vision, shortness of breath and hyperglycemia for past 2 days. Associated with urinary frequency. She reported that her glucose reason for any defined range for past 2 days. Her Lantus was recently increased from 42 to 50 units daily. Also takes Metformin and short-acting insulin on sliding scale. Has history of chronic back pain from lumbar spondylosis and sacroiliitis, follows pain management. On Percocets for pain. Was started on methylprednisone 4 mg daily for pain. Initial ER labs:  Blood glucose 420, bicarb 26, anion gap 13, beta hydroxybutyrate 0.11, creatinine 1. 19. VBG: pH 7.315, PCO2 59.4, bicarb 30.2. Chest x-ray shows no acute abnormalities. Rapid response:  Patient was admitted under OBS unit. This morning it was a rapid response for patient being desaturating with O2 sats in low 50s. Per RN, patient received 2 tablets of Percocet 2 hours prior to this episode. On examination, patient is very drowsy, disoriented. Per RN, patient was not placed on any CPAP/BiPAP overnight, also her O2 nasal cannula was off. POC glucose was in 300s. Received 1 dose of Narcan which woke her up and saturations improved to 90s. Patient placed on BiPAP and admitted to internal medicine, transfer to stepdown.     On floor:  Patient maintaining saturations on BiPAP. Mentation improved. Alert, awake and oriented x3. Appears very dehydrated. Review of Systems:  General ROS: Completed and except as mentioned above were negative   HEENT ROS: Completed and except as mentioned above were negative   Allergy and Immunology ROS:  Completed and except as mentioned above were negative  Hematological and Lymphatic ROS:  Completed and except as mentioned above were negative  Respiratory ROS:  Completed and except as mentioned above were negative  Cardiovascular ROS:  Completed and except as mentioned above were negative  Gastrointestinal ROS: Completed and except as mentioned above were negative  Genito-Urinary ROS:  Completed and except as mentioned above were negative  Musculoskeletal ROS:  Completed and except as mentioned above were negative  Neurological ROS:  Completed and except as mentioned above were negative  Skin & Dermatological ROS:  Completed and except as mentioned above were negative  Psychological ROS:  Completed and except as mentioned above were negative    PAST MEDICAL HISTORY     3 previous catheterizations, minimal coronary artery disease. 2D echo 2/2/2020 showed EF of 66%. Past Medical History:   Diagnosis Date    Allergic rhinitis     Anxiety 7/17/2013    Arthritis     Asthma     Atrial fibrillation ([facility])     Back pain     NERVE/DR. GRACIA    Benign hypertension with CKD (chronic kidney disease) stage III     CAD (coronary artery disease) 3/21/2013    Caffeine use     2 coffee/day    CHF (congestive heart failure) ([facility])     Chronic kidney disease     CKD (chronic kidney disease) stage 3, GFR 30-59 ml/min     COPD (chronic obstructive pulmonary disease) ([facility])     emphysema    Degeneration of lumbar or lumbosacral intervertebral disc     Depression     DM (diabetes mellitus) ([facility])     Emphysema of lung ([facility])     Gastritis     GERD (gastroesophageal reflux disease)     Hearing loss     Hematuria     Hiatal hernia and Sulfa antibiotics    MEDICATIONS PRIOR TO ADMISSION     Prior to Admission medications    Medication Sig Start Date End Date Taking? Authorizing Provider   furosemide (LASIX) 20 MG tablet TAKE 1 TAB BY MOUTH ONCE A DAY AS NEEDED ( WEIGHT GAIN 3 LBS OVERNIGHT )  10/31/20   Neetu Lugo MD   TRUE METRIX BLOOD GLUCOSE TEST strip THREE TIMES A DAY  10/31/20   Neetu Lugo MD   oxyCODONE-acetaminophen (PERCOCET) 5-325 MG per tablet Take 1 tablet by mouth See Admin Instructions for 30 days. Intended supply: 30 days. 1 tab 3-4 times a day prn 11/1/20 12/1/20  Anthony Shine MD   methylPREDNISolone (MEDROL DOSEPACK) 4 MG tablet Take by mouth. 10/28/20 11/3/20  Neetu Lugo MD   topiramate (TOPAMAX) 100 MG tablet TAKE 1 TABLET BY MOUTH NIGHTLY  10/19/20   VERONIKA Moran - CNP   clopidogrel (PLAVIX) 75 MG tablet TAKE 1 TAB BY MOUTH ONCE A DAY ( IN THE MORNING ) -THIS MEDICINE MAY BE TAKENWITH OR WITHOUT FOOD  10/17/20   Neetu Lugo MD   LANTUS SOLOSTAR 100 UNIT/ML injection pen INJECT 42 UNITS INTO THE SKIN 2 TIMES DAILY  10/8/20   Neetu Lugo MD   zoster recombinant adjuvanted vaccine [facility]) 50 MCG/0.5ML SUSR injection 50 MCG IM then repeat 2-6 months.   Patient not taking: Reported on 10/28/2020 9/14/20 9/14/21  Neetu Lugo MD   Icosapent Ethyl (VASCEPA) 1 g CAPS capsule Take 1 capsule by mouth 2 times daily 9/14/20   Neetu Lugo MD   vitamin B-12 (CYANOCOBALAMIN) 100 MCG tablet Take 50 mcg by mouth daily    Historical Provider, MD   isosorbide dinitrate (ISORDIL) 30 MG tablet Take 30 mg by mouth    Historical Provider, MD   ULTICARE MINI PEN NEEDLES 31G X 6 MM MISC USE AS DIRECTED  8/14/20   Neetu Lugo MD   metFORMIN (GLUCOPHAGE) 1000 MG tablet TAKE 1 TAB BY MOUTH TWICE A DAY ( IN THE MORNING AND BEDTIME )  8/10/20   Neetu Lugo MD   ofloxacin (OCUFLOX) 0.3 % solution  7/29/20   Historical Provider, MD   gabapentin (NEURONTIN) 300 MG capsule Take 1 capsule by mouth 3 times daily for 30 days. 7/30/20 10/1/20  Gatha Side, APRN - CNP   naloxone 4 MG/0.1ML LIQD nasal spray 1 spray by Nasal route as needed for Opioid Reversal 7/30/20   Gatha Side, APRN - CNP   nystatin (MYCOSTATIN) 055738 UNIT/GM powder Apply 3 times daily. 6/25/20   Ravindra Vang MD   dicyclomine (BENTYL) 10 MG capsule Take 1 capsule by mouth 2 times daily as needed (abdominal spasm) 6/25/20   Ravindra Vang MD   tobramycin-dexamethasone (TOBRADEX) 0.3-0.1 % ophthalmic suspension  1/30/20   Historical Provider, MD   insulin aspart (NOVOLOG FLEXPEN) 100 UNIT/ML injection pen If <139 - No Insulin; 140-199-2 Units;200-249-4 Units;250-299-6 Units;300-349-8 Units;350-400=10 Units; Above 400-12 Units 2/12/20   Ravindra Vang MD   aspirin 81 MG chewable tablet Take 1 tablet by mouth daily 12/3/19   Ravindra Vang MD   lidocaine (LMX) 4 % cream Apply topically every 8 hrs as needed for pain 11/11/19   Ravindra Vang MD   Lift Chair MISC by Does not apply route 9/24/19   Ravindra Vang MD   Misc.  Devices (ADJUST BATH/SHOWER SEAT/BACK) MISC Use daily for shower 9/24/19   Ravindra Vang MD   ferrous sulfate 325 (65 Fe) MG tablet TAKE 1 TAB BY MOUTH EVERY MORNING 8/8/19   Ravindra Vang MD   magnesium oxide (MAG-OX) 400 MG tablet TAKE 1 TAB BY MOUTH TWICE A DAY ( AM AND BEDTIME ) 8/8/19   Ravindra Vang MD   carvedilol (COREG) 3.125 MG tablet TAKE 1 TAB BY MOUTH TWICE A DAY ( AM AND BEDTIME ) 8/8/19   Ravindra Vang MD   citalopram (CELEXA) 40 MG tablet TAKE 1/2  TAB BY MOUTH TWICE  A DAY 4/2/19   Ravindra Vang MD   pantoprazole (PROTONIX) 40 MG tablet Take 1 tablet by mouth 2 times daily 3/15/19   Rosy Salter DO   Alcohol Swabs (B-D SINGLE USE SWABS REGULAR) PADS THREE TIMES A DAY 12/12/18   Ravindra Vang MD   losartan (COZAAR) 25 MG tablet TAKE 1 TAB BY MOUTH ONCE A DAY 11/6/18   Ravindra Vang MD   Lift Chair MISC by Does not apply route Use daily at home to help with ADL's 11/6/18   Ravindra Vang MD nitroGLYCERIN (NITROSTAT) 0.4 MG SL tablet 1 under the tongue as needed for angina, may repeat q5mins for up three doses 18   Historical Provider, MD   Blood Glucose Monitoring Suppl HARMEET Use daily to check BS 3/27/18   Jamari Garay MD   ipratropium-albuterol (DUONEB) 0.5-2.5 (3) MG/3ML SOLN nebulizer solution Inhale 3 mLs into the lungs every 4 hours 18   Yovana Ivy MD   Melatonin 10 MG TABS Take 10 mg by mouth nightly 10/19/17   Jamari Garay MD   Compression Stockings MISC by Does not apply route Pressure between 20 - 25 . 17   Jamari Garay MD   docusate sodium (COLACE) 100 MG capsule Take 1 capsule by mouth 2 times daily Please use while taking Percocet 9/10/15   Angelica Mosher MD   SYMBICORT 160-4.5 MCG/ACT AERO   2 puffs As needed for sob 5/28/15   Historical Provider, MD   OXYGEN Inhale 3 L into the lungs     Historical Provider, MD       SOCIAL HISTORY     Tobacco: History of tobacco abuse. Quit smoking. Alcohol: Occasional  Illicits: Denies    FAMILY HISTORY     Family History   Problem Relation Age of Onset    Diabetes Mother     Heart Disease Mother     Stomach Cancer Father     Diabetes Maternal Grandmother         also aunts and uncles (maternal)    Emphysema Sister        PHYSICAL EXAM     Vitals: /71   Pulse 82   Temp 96.9 °F (36.1 °C) (Temporal)   Resp 18   Ht 5' 2\" (1.575 m)   Wt 251 lb 13 oz (114.2 kg)   LMP  (LMP Unknown)   SpO2 94%   BMI 46.06 kg/m²   Tmax: Temp (24hrs), Av.1 °F (36.7 °C), Min:96.9 °F (36.1 °C), Max:98.7 °F (37.1 °C)    Last Body weight:   Wt Readings from Last 3 Encounters:   10/31/20 251 lb 13 oz (114.2 kg)   20 250 lb 9.6 oz (113.7 kg)   20 251 lb (113.9 kg)     Body Mass Index : Body mass index is 46.06 kg/m².       PHYSICAL EXAMINATION:  Constitutional: This is a well developed, well nourished, Greater than 36 - Morbid Obesity / Extreme Obesity / Grade III 78y.o. year old female who is alert, oriented, cooperative and in mild respiratory distress. Head: normocephalic and atraumatic. EENT:  PERRLA. No conjunctival injections. Septum was midline, mucosa was without erythema, exudates or cobblestoning. No thrush was noted. Tongue appears dry. Neck: Supple without thyromegaly. No elevated JVP. Trachea was midline. Respiratory: Chest was symmetrical without dullness to percussion. Breath sounds bilaterally were clear to auscultation. There were no wheezes, rhonchi or rales. Cardiovascular: Regular without murmur, clicks, gallops or rubs. Abdomen: Slightly rounded and soft without organomegaly. No rebound, rigidity or guarding was appreciated. Extremities:  No lower extremity edema, ulcerations, tenderness, varicosities or erythema. Skin:  Warm and dry.   Neurological/Psychiatric: The patient's general behavior, level of consciousness, thought content and emotional status is normal.      INVESTIGATIONS     Laboratory Testing:     Recent Results (from the past 24 hour(s))   POC Glucose Fingerstick    Collection Time: 10/31/20 12:02 PM   Result Value Ref Range    POC Glucose 232 (H) 65 - 105 mg/dL   POC Glucose Fingerstick    Collection Time: 10/31/20  4:59 PM   Result Value Ref Range    POC Glucose 204 (H) 65 - 105 mg/dL   POC Glucose Fingerstick    Collection Time: 10/31/20  8:01 PM   Result Value Ref Range    POC Glucose 208 (H) 65 - 105 mg/dL   BASIC METABOLIC PANEL    Collection Time: 11/01/20  5:51 AM   Result Value Ref Range    Glucose 172 (H) 70 - 99 mg/dL    BUN 21 8 - 23 mg/dL    CREATININE 1.23 (H) 0.50 - 0.90 mg/dL    Bun/Cre Ratio NOT REPORTED 9 - 20    Calcium 8.4 (L) 8.6 - 10.4 mg/dL    Sodium 137 135 - 144 mmol/L    Potassium 5.0 3.7 - 5.3 mmol/L    Chloride 102 98 - 107 mmol/L    CO2 26 20 - 31 mmol/L    Anion Gap 9 9 - 17 mmol/L    GFR Non-African American 43 (L) >60 mL/min    GFR  52 (L) >60 mL/min    GFR Comment          GFR Staging NOT REPORTED    POC Glucose Fingerstick    Collection Time: 11/01/20  7:38 AM   Result Value Ref Range    POC Glucose 369 (H) 65 - 105 mg/dL   POC Glucose Fingerstick    Collection Time: 11/01/20  8:53 AM   Result Value Ref Range    POC Glucose 330 (H) 65 - 105 mg/dL       Imaging:   Xr Chest (2 Vw)    Result Date: 10/30/2020  Unremarkable single upright portable AP view of the chest.       ASSESSMENT & PLAN     ASSESSMENT / PLAN:     IMPRESSION  This is a 78 y.o. female who presented with hyperglycemia and found to have acute on chronic respiratory failure. Patient admitted to inpatient status for management of DM type II with hyperglycemia and acute on chronic respiratory failure. 1. Acute on chronic hypoxic respiratory failure likely multifactorial from HIGINIO/OHS, respiratory depression from opioid usage:  · Decrease Percocet to 1 tablet every 6 as needed. · Continue intermittent BiPAP alternating with nasal cannula O2. · Repeat ABG/VBG. · RT aerosol protocol. · Expecting to improve by tomorrow a.m.  2. HIGINIO/OHS overlap syndrome:  · Patient possibly noncompliant with CPAP at home. · Patient did not receive any CPAP/BiPAP overnight, possible reason for decompensation. · Continue intermittent BiPAP monitoring with nasal cannula O2. · Follow-up pulmonology outpatient. 3. Uncontrolled diabetes mellitus type 2 with hyperglycemia:  · Not in DKA, normal AG, bicarb normal, beta hydroxybutyrate normal.  · HbA1c 9/14/2020 was 10.6. · Hold Metformin for now. · Start Lantus 30 units daily and continue high-dose ICS. · Hypoglycemia protocol. · POCT glucose checks as needed. · Hold metformin  4. Hypertension:  · Stable. · Resume Coreg and Cozaar. · Hold Lasix at this time. 5. YAJAIRA on CKD stage III likely secondary to dehydration:  · IV fluids at 125 mL/h. · Follow-up BMP tomorrow a.m. 6. COPD: Not in exacerbation. RT also protocol. 7. Chronic lower back pain from lumbar spondylosis and sacroiliitis: Pain management.         DVT ppx: Lovenox 30 Mg subcu twice daily  GI ppx: None    PT/OT/SW: PT OT consulted  Discharge Planning: Remains inpatient for today. Expected discharge tomorrow. Bobbi Pascual MD  Internal Medicine Resident, PGY-2  [address], [place]  11/1/2020, 11:02 AM

## 2020-11-01 NOTE — PROGRESS NOTES
Patient transferred to medicine team after rapid response was called this am. Narcan given and patient became more responsive although still remained drowsy. Discussed with admitting medicine team who accepted and will assume care at this time.      Electronically signed by Zaria Guthrie DO on 11/1/2020 at 9:57 AM

## 2020-11-01 NOTE — PROGRESS NOTES
CDU Discharge Summary        Patient:  Iggy Rivera  YOB: 1950    MRN: [id number]   Acct: [de-identified]    Primary Care Physician: Gabe Boxer, MD    Admit date:  10/30/2020  5:13 PM  Transfer date: 11/1/2020    Discharge Diagnoses/Diagnoses at time of Transfer:    Acute somnolence due to unknown etiology  Improved with Narcan    Follow-up:  Patient is being transferred to another service. Outpatient follow-up will be arranged by discharging service. Discharge Medications:  Changes to medications made prior to transfer: Hold all narcotics        Rylie Hall   Home Medication Instructions [facility]:[id number]    Printed on:11/01/20 9630   Medication Information                      Alcohol Swabs (B-D SINGLE USE SWABS REGULAR) PADS  THREE TIMES A DAY             aspirin 81 MG chewable tablet  Take 1 tablet by mouth daily             Blood Glucose Monitoring Suppl HARMEET  Use daily to check BS             carvedilol (COREG) 3.125 MG tablet  TAKE 1 TAB BY MOUTH TWICE A DAY ( AM AND BEDTIME )             citalopram (CELEXA) 40 MG tablet  TAKE 1/2  TAB BY MOUTH TWICE  A DAY             clopidogrel (PLAVIX) 75 MG tablet  TAKE 1 TAB BY MOUTH ONCE A DAY ( IN THE MORNING ) -THIS MEDICINE MAY BE TAKENWITH OR WITHOUT FOOD              Compression Stockings MISC  by Does not apply route Pressure between 20 - 25 .             dicyclomine (BENTYL) 10 MG capsule  Take 1 capsule by mouth 2 times daily as needed (abdominal spasm)             docusate sodium (COLACE) 100 MG capsule  Take 1 capsule by mouth 2 times daily Please use while taking Percocet             ferrous sulfate 325 (65 Fe) MG tablet  TAKE 1 TAB BY MOUTH EVERY MORNING             furosemide (LASIX) 20 MG tablet  TAKE 1 TAB BY MOUTH ONCE A DAY AS NEEDED ( WEIGHT GAIN 3 LBS OVERNIGHT )              gabapentin (NEURONTIN) 300 MG capsule  Take 1 capsule by mouth 3 times daily for 30 days.              Icosapent Ethyl (VASCEPA) 1 g CAPS capsule  Take 1 capsule by mouth 2 times daily             insulin aspart (NOVOLOG FLEXPEN) 100 UNIT/ML injection pen  If <139 - No Insulin; 140-199-2 Units;200-249-4 Units;250-299-6 Units;300-349-8 Units;350-400=10 Units; Above 400-12 Units             ipratropium-albuterol (DUONEB) 0.5-2.5 (3) MG/3ML SOLN nebulizer solution  Inhale 3 mLs into the lungs every 4 hours             isosorbide dinitrate (ISORDIL) 30 MG tablet  Take 30 mg by mouth             LANTUS SOLOSTAR 100 UNIT/ML injection pen  INJECT 42 UNITS INTO THE SKIN 2 TIMES DAILY              lidocaine (LMX) 4 % cream  Apply topically every 8 hrs as needed for pain             Lift Chair MISC  by Does not apply route Use daily at home to help with ADL's             Lift Chair MISC  by Does not apply route             losartan (COZAAR) 25 MG tablet  TAKE 1 TAB BY MOUTH ONCE A DAY             magnesium oxide (MAG-OX) 400 MG tablet  TAKE 1 TAB BY MOUTH TWICE A DAY ( AM AND BEDTIME )             Melatonin 10 MG TABS  Take 10 mg by mouth nightly             metFORMIN (GLUCOPHAGE) 1000 MG tablet  TAKE 1 TAB BY MOUTH TWICE A DAY ( IN THE MORNING AND BEDTIME )              methylPREDNISolone (MEDROL DOSEPACK) 4 MG tablet  Take by mouth. Misc. Devices (ADJUST BATH/SHOWER SEAT/BACK) MISC  Use daily for shower             naloxone 4 MG/0.1ML LIQD nasal spray  1 spray by Nasal route as needed for Opioid Reversal             nitroGLYCERIN (NITROSTAT) 0.4 MG SL tablet  1 under the tongue as needed for angina, may repeat q5mins for up three doses             nystatin (MYCOSTATIN) 527322 UNIT/GM powder  Apply 3 times daily. ofloxacin (OCUFLOX) 0.3 % solution               oxyCODONE-acetaminophen (PERCOCET) 5-325 MG per tablet  Take 1 tablet by mouth See Admin Instructions for 30 days. Intended supply: 30 days.  1 tab 3-4 times a day prn             OXYGEN  Inhale 3 L into the lungs              pantoprazole (PROTONIX) 40 MG tablet  Take 1 tablet by mouth 2 times daily             SYMBICORT 160-4.5 MCG/ACT AERO    2 puffs As needed for sob             tobramycin-dexamethasone (TOBRADEX) 0.3-0.1 % ophthalmic suspension               topiramate (TOPAMAX) 100 MG tablet  TAKE 1 TABLET BY MOUTH NIGHTLY              TRUE METRIX BLOOD GLUCOSE TEST strip  THREE TIMES A DAY              ULTICARE MINI PEN NEEDLES 31G X 6 MM MISC  USE AS DIRECTED              vitamin B-12 (CYANOCOBALAMIN) 100 MCG tablet  Take 50 mcg by mouth daily             zoster recombinant adjuvanted vaccine (SHINGRIX) 50 MCG/0.5ML SUSR injection  50 MCG IM then repeat 2-6 months. Diet:  DIET CARB CONTROL; , Advance as tolerated     Activity:  As tolerated    Consultants: IP CONSULT TO CASE MANAGEMENT    Procedures:  Not indicated     Diagnostic Test:     Xr Chest (2 Vw)    Result Date: 10/30/2020  EXAMINATION: TWO XRAY VIEWS OF THE CHEST 10/30/2020 7:07 pm COMPARISON: Chest two views June 30, 2020 HISTORY: ORDERING SYSTEM PROVIDED HISTORY: Hyperglycemia eval for infectious etiology TECHNOLOGIST PROVIDED HISTORY: Hyperglycemia eval for infectious etiology FINDINGS: The heart is normal in size and configuration. The mediastinal contours are within normal limits. The lungs are well aerated. The pleural surfaces are normal and no evidence of a pleural effusion is seen. Bones and soft tissues are unremarkable. Unremarkable single upright portable AP view of the chest.           Physical Exam:    General appearance - NAD, AOx 3  Lungs -CTA Bilat  Heart - RRR no murmurs  Abdomen - Soft NT/ND  Neurological:  No focal motor deficit, sensory loss  Extremities - Cap refil <2 sec in all ext. Skin -warm, dry      Hospital Course:  Clinical course has improved, labs and imaging reviewed. Nadia Burnham originally presented to the hospital on 10/30/2020  5:13 PM. with acute hyperglycemia. She was admitted for observation and further work-up. Labs and imaging were followed daily. Imaging results as above. Due to rapid response and continued somnolence patient was transferred to medicine service and to stepdown unit. She requires a higher level of care. Disposition: Transfer    Patient is transferred to internal medicine's service.   Accepting physician is Dr. Praveen Meraz    Condition: Stable    Time Spent: 2 day    --  Devin Thompson DO   Emergency Medicine Resident Physician, PGY-1

## 2020-11-01 NOTE — PROGRESS NOTES
Went to do am vital signs patient very  Diaphoretic blood sugar 369 , patient unresponsive  Would not wake to verbal stimuli or sternal rub . Rapid response called . Dr. Santy Levine to beside.

## 2020-11-01 NOTE — PROGRESS NOTES
Critical result taken. Venous blood gas: critical low pH 7.128. Writer perfect served internal medicine senior on-call. Patient currently on bipap 50% fiO2 saturating 100%.   Everardo Jerome RN

## 2020-11-01 NOTE — FLOWSHEET NOTE
SPIRITUAL CARE DEPARTMENT - [facility] [address]  PROGRESS NOTE    Shift date: 11/01/2020  Shift day: Sunday   Shift # 1    Room # 0317/0317-01   Name: Nadia Burnham            Age: 78 y.o. Gender: female          Judaism: [address] [place] [address] of Jehovah's witness: Unknown    Referral: Rapid Response    Admit Date & Time: 10/30/2020  5:13 PM    PATIENT/EVENT DESCRIPTION:  Nadia Burnham is a 78 y.o. female who was admitted to room 317. Rapid response was called on patient. Patient felt better after a few minutes. SPIRITUAL ASSESSMENT/INTERVENTION:  No spiritual assessment was carried out because  did not speak to patient. Family was not present at the time.  maintained presence and offered support to staff. SPIRITUAL CARE FOLLOW-UP PLAN:  Follow up visits recommended for ongoing assessment of patient's condition and for more spiritual and emotional support. Electronically signed by Fr. Janee Art on 11/1/2020 at 9:33 AM.  [facility]  [phone]       11/01/20 0932   Encounter Summary   Services provided to: Patient   Support System Family members   Continue Visiting   (11/01/2020)   Complexity of Encounter Moderate   Length of Encounter 30 minutes   Routine   Type Follow up   Assessment Calm; Approachable   Crisis   Type Rapid response

## 2020-11-02 LAB
EKG ATRIAL RATE: 77 BPM
EKG ATRIAL RATE: 94 BPM
EKG P AXIS: -19 DEGREES
EKG P AXIS: 66 DEGREES
EKG P-R INTERVAL: 132 MS
EKG P-R INTERVAL: 136 MS
EKG Q-T INTERVAL: 344 MS
EKG Q-T INTERVAL: 348 MS
EKG QRS DURATION: 66 MS
EKG QRS DURATION: 68 MS
EKG QTC CALCULATION (BAZETT): 389 MS
EKG QTC CALCULATION (BAZETT): 435 MS
EKG R AXIS: -34 DEGREES
EKG R AXIS: 115 DEGREES
EKG T AXIS: 6 DEGREES
EKG T AXIS: 67 DEGREES
EKG VENTRICULAR RATE: 77 BPM
EKG VENTRICULAR RATE: 94 BPM
GLUCOSE BLD-MCNC: 150 MG/DL (ref 65–105)
GLUCOSE BLD-MCNC: 166 MG/DL (ref 65–105)
GLUCOSE BLD-MCNC: 205 MG/DL (ref 65–105)
GLUCOSE BLD-MCNC: 229 MG/DL (ref 65–105)

## 2020-11-02 PROCEDURE — 2580000003 HC RX 258: Performed by: STUDENT IN AN ORGANIZED HEALTH CARE EDUCATION/TRAINING PROGRAM

## 2020-11-02 PROCEDURE — 2060000000 HC ICU INTERMEDIATE R&B

## 2020-11-02 PROCEDURE — 94660 CPAP INITIATION&MGMT: CPT

## 2020-11-02 PROCEDURE — 93010 ELECTROCARDIOGRAM REPORT: CPT | Performed by: INTERNAL MEDICINE

## 2020-11-02 PROCEDURE — 82947 ASSAY GLUCOSE BLOOD QUANT: CPT

## 2020-11-02 PROCEDURE — 94640 AIRWAY INHALATION TREATMENT: CPT

## 2020-11-02 PROCEDURE — 6370000000 HC RX 637 (ALT 250 FOR IP): Performed by: STUDENT IN AN ORGANIZED HEALTH CARE EDUCATION/TRAINING PROGRAM

## 2020-11-02 PROCEDURE — 94760 N-INVAS EAR/PLS OXIMETRY 1: CPT

## 2020-11-02 PROCEDURE — 6360000002 HC RX W HCPCS: Performed by: EMERGENCY MEDICINE

## 2020-11-02 PROCEDURE — 2700000000 HC OXYGEN THERAPY PER DAY

## 2020-11-02 PROCEDURE — 6370000000 HC RX 637 (ALT 250 FOR IP): Performed by: INTERNAL MEDICINE

## 2020-11-02 RX ORDER — IPRATROPIUM BROMIDE AND ALBUTEROL SULFATE 2.5; .5 MG/3ML; MG/3ML
1 SOLUTION RESPIRATORY (INHALATION) 4 TIMES DAILY
Status: DISCONTINUED | OUTPATIENT
Start: 2020-11-02 | End: 2020-11-03 | Stop reason: HOSPADM

## 2020-11-02 RX ORDER — ALBUTEROL SULFATE 2.5 MG/3ML
2.5 SOLUTION RESPIRATORY (INHALATION) EVERY 6 HOURS PRN
Status: DISCONTINUED | OUTPATIENT
Start: 2020-11-02 | End: 2020-11-03 | Stop reason: HOSPADM

## 2020-11-02 RX ORDER — ALBUTEROL SULFATE 2.5 MG/3ML
2.5 SOLUTION RESPIRATORY (INHALATION)
Status: DISCONTINUED | OUTPATIENT
Start: 2020-11-02 | End: 2020-11-02

## 2020-11-02 RX ADMIN — CARVEDILOL 3.12 MG: 3.12 TABLET, FILM COATED ORAL at 22:08

## 2020-11-02 RX ADMIN — INSULIN LISPRO 3 UNITS: 100 INJECTION, SOLUTION INTRAVENOUS; SUBCUTANEOUS at 08:49

## 2020-11-02 RX ADMIN — PANTOPRAZOLE SODIUM 40 MG: 40 TABLET, DELAYED RELEASE ORAL at 20:52

## 2020-11-02 RX ADMIN — IPRATROPIUM BROMIDE AND ALBUTEROL SULFATE 1 AMPULE: .5; 3 SOLUTION RESPIRATORY (INHALATION) at 12:27

## 2020-11-02 RX ADMIN — GABAPENTIN 300 MG: 300 CAPSULE ORAL at 14:30

## 2020-11-02 RX ADMIN — INSULIN LISPRO 3 UNITS: 100 INJECTION, SOLUTION INTRAVENOUS; SUBCUTANEOUS at 20:51

## 2020-11-02 RX ADMIN — ENOXAPARIN SODIUM 30 MG: 30 INJECTION SUBCUTANEOUS at 22:08

## 2020-11-02 RX ADMIN — MAGNESIUM GLUCONATE 500 MG ORAL TABLET 400 MG: 500 TABLET ORAL at 08:50

## 2020-11-02 RX ADMIN — Medication 10 ML: at 08:49

## 2020-11-02 RX ADMIN — SODIUM CHLORIDE: 9 INJECTION, SOLUTION INTRAVENOUS at 11:08

## 2020-11-02 RX ADMIN — SODIUM CHLORIDE: 9 INJECTION, SOLUTION INTRAVENOUS at 03:21

## 2020-11-02 RX ADMIN — INSULIN GLARGINE 30 UNITS: 100 INJECTION, SOLUTION SUBCUTANEOUS at 08:49

## 2020-11-02 RX ADMIN — IPRATROPIUM BROMIDE AND ALBUTEROL SULFATE 1 AMPULE: .5; 3 SOLUTION RESPIRATORY (INHALATION) at 17:19

## 2020-11-02 RX ADMIN — GABAPENTIN 300 MG: 300 CAPSULE ORAL at 22:08

## 2020-11-02 RX ADMIN — INSULIN LISPRO 3 UNITS: 100 INJECTION, SOLUTION INTRAVENOUS; SUBCUTANEOUS at 17:24

## 2020-11-02 RX ADMIN — CITALOPRAM 40 MG: 20 TABLET, FILM COATED ORAL at 08:50

## 2020-11-02 RX ADMIN — INSULIN LISPRO 9 UNITS: 100 INJECTION, SOLUTION INTRAVENOUS; SUBCUTANEOUS at 12:03

## 2020-11-02 RX ADMIN — SODIUM CHLORIDE: 9 INJECTION, SOLUTION INTRAVENOUS at 17:46

## 2020-11-02 RX ADMIN — MAGNESIUM GLUCONATE 500 MG ORAL TABLET 400 MG: 500 TABLET ORAL at 20:52

## 2020-11-02 RX ADMIN — LOSARTAN POTASSIUM 25 MG: 25 TABLET, FILM COATED ORAL at 08:50

## 2020-11-02 RX ADMIN — PANTOPRAZOLE SODIUM 40 MG: 40 TABLET, DELAYED RELEASE ORAL at 08:50

## 2020-11-02 RX ADMIN — TOPIRAMATE 100 MG: 100 TABLET, FILM COATED ORAL at 20:51

## 2020-11-02 RX ADMIN — CARVEDILOL 3.12 MG: 3.12 TABLET, FILM COATED ORAL at 08:50

## 2020-11-02 RX ADMIN — ASPIRIN 81 MG: 81 TABLET, CHEWABLE ORAL at 08:50

## 2020-11-02 RX ADMIN — GABAPENTIN 300 MG: 300 CAPSULE ORAL at 08:50

## 2020-11-02 RX ADMIN — Medication 5 MG: at 20:51

## 2020-11-02 RX ADMIN — CLOPIDOGREL 75 MG: 75 TABLET, FILM COATED ORAL at 08:49

## 2020-11-02 RX ADMIN — ENOXAPARIN SODIUM 30 MG: 30 INJECTION SUBCUTANEOUS at 08:50

## 2020-11-02 ASSESSMENT — PAIN SCALES - GENERAL
PAINLEVEL_OUTOF10: 0
PAINLEVEL_OUTOF10: 3
PAINLEVEL_OUTOF10: 0

## 2020-11-02 ASSESSMENT — PAIN DESCRIPTION - PAIN TYPE: TYPE: ACUTE PAIN

## 2020-11-02 ASSESSMENT — PAIN DESCRIPTION - LOCATION: LOCATION: BACK

## 2020-11-02 NOTE — PROGRESS NOTES
Occupational Therapy    Occupational Therapy Not Seen Note    DATE: 2020  Name: Danette Robison  : 1950  MRN: [id number]    Patient not available for Occupational Therapy due to: Other: pt on Bi pap, desatting with activity.  pt with decreased breath sounds per RT    Next Scheduled Treatment: 11/3/2020    Electronically signed by LINDSAY Rocha on 2020 at 4:42 PM

## 2020-11-02 NOTE — PLAN OF CARE
Patient was kept on BiPAP. However her VBG was suggestive of hypercarbia and her mentation was suggestive of CO2 narcosis. Difficult to obtain ABG as patient was a hard stick. Subsequently writer discussed the situation with RT in critical care-[place]. They came up and were able to draw the ABG. Suggestive of:    Arterial Blood Gas result:  pO2 63.9; pCO2 71.4; pH 7.216;  HCO3 28.9, %O2 Sat 86. BiPAP settings adjusted accordingly. Upon repeat evaluation patient's saturation improved. Discussed with the RN Mingo Membreno in detail. Patient's head end of the bed to be elevated at all times. Low threshold for intubation. Critical care informed of the same. Discussed the same with the patient. However given the CO2 narcosis her mentation is fluctuating. Discussed with patient's son. He is agreeable for intubation. We will keep a close eye for now. Will likely call anesthesia for intubation given patient's difficult intubation and Mallampati score.

## 2020-11-02 NOTE — PROGRESS NOTES
Physical Therapy  DATE: 2020    NAME: Maribel Rodriguez  MRN: [id number]   : 1950    Patient not seen this date for Physical Therapy due to:  [] Blood transfusion in progress  [] Hemodialysis  []  Patient Declined  [] Spine Precautions   [] Strict Bedrest  [] Surgery/ Procedure  [] Testing      [x] Other: pt on bipap- desating with mobility. PT will check back as time allows or 11/3/20. [] PT being discontinued at this time. Patient independent. No further needs. [] PT being discontinued at this time as the patient has been transferred to palliative care. No further needs.     Isamar Wang, PT

## 2020-11-02 NOTE — PLAN OF CARE
NON INVASIVE VENTILATION  PROVIDE OPTIMAL VENTILATION/ACCEPTABLE SP02  ASSESSMENT SKIN INTEGRITY  PATIENT EDUCATION AS NEEDED  BIPAP AS NEEDED     PROVIDE ADEQUATE OXYGENATION WITH ACCEPTABLE SP02/ABG'S  [x]  IDENTIFY APPROPRIATE OXYGEN THERAPY  [x]   MONITOR SP02/ABG'S AS NEEDED   [x]   PATIENT EDUCATION AS NEEDED

## 2020-11-02 NOTE — PROGRESS NOTES
[facility]  Internal Medicine Teaching Residency Program  Inpatient Daily Progress Note  ______________________________________________________________________________    Patient: Rosendo Colorado  YOB: 1950   VYZ:[id number]    Acct: [de-identified]     Room: [facility]-  Admit date: 10/30/2020  Today's date: 11/02/20  Number of days in the hospital: 3    SUBJECTIVE   Admitting Diagnosis: Acute respiratory distress  CC:  Blurred vision, shortness of breath, hyperglycemia  Pt examined at bedside. Chart & results reviewed. Patient's mentation improving. Patient on BiPAP. Last ABG shows pH 7.2, PO2 63.9, HCO3 28.9, PCO2 71.4. Suggestive of acute hypercarbia. Glucose trending down 147 this a.m.    ROS:  Constitutional:  negative for chills, fevers, sweats  Respiratory:  negative for cough, dyspnea on exertion, hemoptysis, shortness of breath, wheezing  Cardiovascular:  negative for chest pain, chest pressure/discomfort, lower extremity edema, palpitations  Gastrointestinal:  negative for abdominal pain, constipation, diarrhea, nausea, vomiting  Neurological:  negative for dizziness, headache  BRIEF HISTORY     The patient is a pleasant 78 y.o. female with PMH of uncontrolled DM type II, HTN, CKD stage III, COPD, HIGINIO, obesity presents with a chief complaint of blurred vision, shortness of breath and hyperglycemia for past 2 days. Associated with urinary frequency. She reported that her glucose reason for any defined range for past 2 days. Her Lantus was recently increased from 42 to 50 units daily. Also takes Metformin and short-acting insulin on sliding scale.     Has history of chronic back pain from lumbar spondylosis and sacroiliitis, follows pain management. On Percocets for pain.   Was started on methylprednisone 4 mg daily for pain.     Initial ER labs:  Blood glucose 420, bicarb 26, anion gap 13, beta hydroxybutyrate 0.11, creatinine without organomegaly. No rebound, rigidity or guarding was appreciated. Lymphatic: No lymphadenopathy. Musculoskeletal: Normal curvature of the spine. No gross muscle weakness. Extremities:  No lower extremity edema, ulcerations, tenderness, varicosities or erythema. Muscle size, tone and strength are normal.  No involuntary movements are noted. Skin:  Warm and dry. Good color, turgor and pigmentation. No lesions or scars.   No cyanosis or clubbing  Neurological/Psychiatric: The patient's general behavior, level of consciousness, thought content and emotional status is normal.        Medications:  Scheduled Medications:    ipratropium-albuterol  1 ampule Inhalation 4x daily    insulin glargine  30 Units Subcutaneous Daily    sodium chloride flush  10 mL Intravenous 2 times per day    enoxaparin  30 mg Subcutaneous BID    aspirin  81 mg Oral Daily    carvedilol  3.125 mg Oral BID    citalopram  40 mg Oral Daily    clopidogrel  75 mg Oral Daily    gabapentin  300 mg Oral TID    losartan  25 mg Oral Daily    magnesium oxide  400 mg Oral BID    pantoprazole  40 mg Oral BID    topiramate  100 mg Oral Nightly    insulin lispro  0-18 Units Subcutaneous TID WC    insulin lispro  0-9 Units Subcutaneous Nightly     Continuous Infusions:    dextrose      sodium chloride 125 mL/hr at 11/02/20 0321     PRN Medicationsalbuterol, 2.5 mg, Q6H PRN  naloxone, 1 mg, PRN  oxyCODONE-acetaminophen, 1 tablet, Q6H PRN  sodium chloride flush, 10 mL, PRN  ondansetron, 4 mg, Q4H PRN  glucose, 15 g, PRN  dextrose, 12.5 g, PRN  glucagon (rDNA), 1 mg, PRN  dextrose, 100 mL/hr, PRN  melatonin, 5 mg, Nightly PRN  acetaminophen, 650 mg, Q4H PRN        Diagnostic Labs:  CBC:   Recent Labs     10/30/20  1825   WBC 7.8   RBC 4.44   HGB 13.6   HCT 42.6   MCV 95.9   RDW 12.6        BMP:   Recent Labs     10/30/20  1813 10/30/20  1825 11/01/20  0551   NA  --  133* 137   K  --  5.0 5.0   CL  --  94* 102   CO2  --  26 26 BUN  --  28* 21   CREATININE 1.32* 1.19* 1.23*     BNP: No results for input(s): BNP in the last 72 hours. PT/INR: No results for input(s): PROTIME, INR in the last 72 hours. APTT: No results for input(s): APTT in the last 72 hours. CARDIAC ENZYMES: No results for input(s): CKMB, CKMBINDEX, TROPONINI in the last 72 hours. Invalid input(s): CKTOTAL;3  FASTING LIPID PANEL:  Lab Results   Component Value Date    CHOL 264 (H) 07/02/2020    HDL 34 (L) 07/02/2020    TRIG 340 (H) 07/02/2020     LIVER PROFILE:   Recent Labs     10/30/20  1825   AST 21   ALT 29   BILITOT 0.20*   ALKPHOS 62      MICROBIOLOGY:   Lab Results   Component Value Date/Time    CULTURE NO SIGNIFICANT GROWTH 03/14/2019 04:35 PM       Imaging:    Xr Chest (2 Vw)    Result Date: 10/30/2020  Unremarkable single upright portable AP view of the chest.       ASSESSMENT & PLAN     ASSESSMENT / PLAN:     This is a 78 y.o. female who presented with hyperglycemia and found to have acute on chronic respiratory failure. Patient admitted to inpatient status for management of DM type II with hyperglycemia and acute on chronic respiratory failure    1. Acute on chronic hypoxic respiratory failure likely multifactorial from HIGINIO, respiratory depression from opioid usage:  · Decrease Percocet to 1 tablet every 6 as needed(Prefer non-opiates)  · Continue intermittent BiPAP alternating with nasal cannula O2. · Repeat ABG shows  pH 7.2, PO2 63.9, HCO3 28.9, PCO2 71.4. Suggestive of acute hypercarbia. Improving from the VBG done earlier. · RT aerosol protocol. 2. HIGINIO/OHS overlap syndrome:  ? Patient possibly noncompliant with CPAP at home. ? Patient did not receive any CPAP/BiPAP overnight, possible reason for decompensation. ? Continue intermittent BiPAP monitoring with nasal cannula O2.  ? Follow-up pulmonology outpatient.   3. Uncontrolled diabetes mellitus type 2 with hyperglycemia:  · Not in DKA, normal AG, bicarb normal, beta hydroxybutyrate normal.  · HbA1c 9/14/2020 was 10.6. · Hold Metformin for now. · Start Lantus 30 units daily and continue high-dose ICS. · Hypoglycemia protocol. · POCT glucose checks as needed. · Hold metformin  4. Hypertension:  · Stable. · Resume Coreg and Cozaar. · Hold Lasix at this time. 5. YAJAIRA on CKD stage III likely secondary to dehydration:  · IV fluids at 125 mL/h. · Creatinine trending up. 6. COPD: Not in exacerbation. RT aerosol protocol. 7. Chronic lower back pain from lumbar spondylosis and sacroiliitis: Pain management preferably with non opiates. DVT ppx : Lovenox injection 30 mg subcutaneous twice daily  GI ppx: Protonix tablet 40 mg oral twice daily    PT/OT: Consulted. Discharge Planning / SW: Ongoing    Yoli Burgos MD  Internal Medicine Resident, PGY-1  [address], [place]  11/2/2020, 10:06 AM   Attending Physician Statement  I have discussed the care of Tamia Morales, including pertinent history and exam findings,  with the resident. I have seen and examined the patient and the key elements of all parts of the encounter have been performed by me. I agree with the assessment, plan and orders as documented by the resident with additions . Treatment plan Discussed with nursing staff in detail , all questions answered . Electronically signed by Keke De Souza MD on   11/2/20 at 8:38 PM EST    Please note that this chart was generated using voice recognition Dragon dictation software. Although every effort was made to ensure the accuracy of this automated transcription, some errors in transcription may have occurred.

## 2020-11-02 NOTE — PROGRESS NOTES
[facility]CHELL[facility] Hypergly[facility] [R73.9] . Vitals:    11/02/20 0555   BP:    Pulse:    Resp: 22   Temp:    SpO2:    . Patients home meds are   Prior to Admission medications    Medication Sig Start Date End Date Taking? Authorizing Provider   furosemide (LASIX) 20 MG tablet TAKE 1 TAB BY MOUTH ONCE A DAY AS NEEDED ( WEIGHT GAIN 3 LBS OVERNIGHT )  10/31/20   Ravindra Vang MD   TRUE METRIX BLOOD GLUCOSE TEST strip THREE TIMES A DAY  10/31/20   Ravindra Vang MD   oxyCODONE-acetaminophen (PERCOCET) 5-325 MG per tablet Take 1 tablet by mouth See Admin Instructions for 30 days. Intended supply: 30 days. 1 tab 3-4 times a day prn 11/1/20 12/1/20  Sanjuana Sandoval MD   methylPREDNISolone (MEDROL DOSEPACK) 4 MG tablet Take by mouth. 10/28/20 11/3/20  Ravindra Vang MD   topiramate (TOPAMAX) 100 MG tablet TAKE 1 TABLET BY MOUTH NIGHTLY  10/19/20   VERONIKA Hubbard - CNP   clopidogrel (PLAVIX) 75 MG tablet TAKE 1 TAB BY MOUTH ONCE A DAY ( IN THE MORNING ) -THIS MEDICINE MAY BE TAKENWITH OR WITHOUT FOOD  10/17/20   Ravindra Vang MD   LANTUS SOLOSTAR 100 UNIT/ML injection pen INJECT 42 UNITS INTO THE SKIN 2 TIMES DAILY  10/8/20   Ravindra Vang MD   zoster recombinant adjuvanted vaccine [facility]) 50 MCG/0.5ML SUSR injection 50 MCG IM then repeat 2-6 months.   Patient not taking: Reported on 10/28/2020 9/14/20 9/14/21  Ravindra Vang MD   Icosapent Ethyl (VASCEPA) 1 g CAPS capsule Take 1 capsule by mouth 2 times daily 9/14/20   Ravindra Vang MD   vitamin B-12 (CYANOCOBALAMIN) 100 MCG tablet Take 50 mcg by mouth daily    Historical Provider, MD   isosorbide dinitrate (ISORDIL) 30 MG tablet Take 30 mg by mouth    Historical Provider, MD   ULTICARE MINI PEN NEEDLES 31G X 6 MM MISC USE AS DIRECTED  8/14/20   Ravindra Vang MD   metFORMIN (GLUCOPHAGE) 1000 MG tablet TAKE 1 TAB BY MOUTH TWICE A DAY ( IN THE MORNING AND BEDTIME )  8/10/20   Ravindra Vang MD   ofloxacin (OCUFLOX) 0.3 % solution  7/29/20   Historical Provider, MD   gabapentin (NEURONTIN) 300 MG capsule Take 1 capsule by mouth 3 times daily for 30 days. 7/30/20 10/1/20  VERONIKA Pacheco CNP   naloxone 4 MG/0.1ML LIQD nasal spray 1 spray by Nasal route as needed for Opioid Reversal 7/30/20   VERONIKA Pacheco CNP   nystatin (MYCOSTATIN) 886278 UNIT/GM powder Apply 3 times daily. 6/25/20   Blayne Chambers MD   dicyclomine (BENTYL) 10 MG capsule Take 1 capsule by mouth 2 times daily as needed (abdominal spasm) 6/25/20   Blayne Chambers MD   tobramycin-dexamethasone (TOBRADEX) 0.3-0.1 % ophthalmic suspension  1/30/20   Historical Provider, MD   insulin aspart (NOVOLOG FLEXPEN) 100 UNIT/ML injection pen If <139 - No Insulin; 140-199-2 Units;200-249-4 Units;250-299-6 Units;300-349-8 Units;350-400=10 Units; Above 400-12 Units 2/12/20   Blayne Chambers MD   aspirin 81 MG chewable tablet Take 1 tablet by mouth daily 12/3/19   Blayne Chambers MD   lidocaine (LMX) 4 % cream Apply topically every 8 hrs as needed for pain 11/11/19   Blayne Chambers MD   Lift Chair MISC by Does not apply route 9/24/19   Blayne Chambers MD   Misc.  Devices (ADJUST BATH/SHOWER SEAT/BACK) MISC Use daily for shower 9/24/19   Blayne Chambers MD   ferrous sulfate 325 (65 Fe) MG tablet TAKE 1 TAB BY MOUTH EVERY MORNING 8/8/19   Blayne Chambers MD   magnesium oxide (MAG-OX) 400 MG tablet TAKE 1 TAB BY MOUTH TWICE A DAY ( AM AND BEDTIME ) 8/8/19   Blayne Chambers MD   carvedilol (COREG) 3.125 MG tablet TAKE 1 TAB BY MOUTH TWICE A DAY ( AM AND BEDTIME ) 8/8/19   Blayne Chambers MD   citalopram (CELEXA) 40 MG tablet TAKE 1/2  TAB BY MOUTH TWICE  A DAY 4/2/19   Blayne Chambers MD   pantoprazole (PROTONIX) 40 MG tablet Take 1 tablet by mouth 2 times daily 3/15/19   Rito , DO   Alcohol Swabs (B-D SINGLE USE SWABS REGULAR) PADS THREE TIMES A DAY 12/12/18   Blayne Chambers MD   losartan (COZAAR) 25 MG tablet TAKE 1 TAB BY MOUTH ONCE A DAY 11/6/18   Blayne Chambers MD   Lift Chair MISC by Does not apply route Use daily at home to help with ADL's 11/6/18   Ravindra Vang MD   nitroGLYCERIN (NITROSTAT) 0.4 MG SL tablet 1 under the tongue as needed for angina, may repeat q5mins for up three doses 8/28/18   Historical Provider, MD   Blood Glucose Monitoring Suppl HARMEET Use daily to check BS 3/27/18   Ravindra Vang MD   ipratropium-albuterol (DUONEB) 0.5-2.5 (3) MG/3ML SOLN nebulizer solution Inhale 3 mLs into the lungs every 4 hours 2/6/18   Spencer Joy MD   Melatonin 10 MG TABS Take 10 mg by mouth nightly 10/19/17   Ravindra Vang MD   Compression Stockings MISC by Does not apply route Pressure between 20 - 25 . 9/11/17   Ravindra Vang MD   docusate sodium (COLACE) 100 MG capsule Take 1 capsule by mouth 2 times daily Please use while taking Percocet 9/10/15   Abhilash Mustafa MD   SYMBICORT 160-4.5 MCG/ACT AERO   2 puffs As needed for sob 5/28/15   Historical Provider, MD   OXYGEN Inhale 3 L into the lungs     Historical Provider, MD   .  Recent Surgical History: None = 0     Assessment Awake and alert. Well known to service. Home regimen is 2-4 txs a day. 2 lpm home O2.    RR 19   Breath Sounds: very decreased.       Bronchodilator assessment at level  3  Hyperinflation assessment at level   Secretion Management assessment at level      []    Bronchodilator Assessment  BRONCHODILATOR ASSESSMENT SCORE  Score 0 1 2 3 4 5   Breath Sounds   []  Patient Baseline []  No Wheeze good aeration []  Faint, scattered wheezing, good aeration [x]  Expiratory Wheezing and or moderately diminished []  Insp/Exp wheeze and/or very diminished []  Insp/Exp and/ or marked distress   Respiratory Rate   []  Patient Baseline []  Less than 20 [x]  Less than 20 []  20-25 []  Greater than 25 []  Greater than 25   Peak flow % of Pred or PB [x]  NA   []  Greater than 90%  []  81-90% []  71-80% []  Less than or equal to 70%  or unable to perform []  Unable due to Respiratory Distress   Dyspnea re []  Patient Baseline []  No SOB [x]  No SOB [x]  SOB on exertion []  SOB min activity []  At rest/acute   e FEV% Predicted       [x]  NA []  Above 69%  []  Unable []  Above 60-69%  []  Unable []  Above 50-59%  []  Unable []  Above 35-49%  []  Unable []  Less than 35%  []  Unable                 []  Hyperinflation Assessment  Score 1 2 3   CXR and Breath Sounds   []  Clear []  No atelectasis  Basilar aeration []  Atelectasis or absent basilar breath sounds   Incentive Spirometry Volume  (Per IBW)   []  Greater than or equal to 15ml/Kg []  less than 15ml/Kg []  less than 15ml/Kg   Surgery within last 2 weeks []  None or general   []  Abdominal or thoracic surgery  []  Abdominal or thoracic   Chronic Pulmonary Historyre []  No []  Yes []  Yes     []  Secretion Management Assessment  Score 1 2 3   Bilateral Breath Sounds   []  Occasional Rhonchi []  Scattered Rhonchi []  Course Rhonchi and/or poor aeration   Sputum    []  Small amount of thin secretions []  Moderate amount of viscous secretions []  Copius, Viscious Yellow/ Secretions   CXR as reported by physician []  clear  []  Unavailable []  Infiltrates and/or consolidation  []  Unavailable []  Mucus Plugging and or lobar consolidation  []  Unavailable   Cough []  Strong, productive cough []  Weak productive cough []  No cough or weak non-productive cough   BARBARA MITCHELL  8:56 AM                            FEMALE                                  MALE                            FEV1 Predicted Normal Values                        FEV1 Predicted Normal Values          Age                                     Height in Feet and Inches       Age                                     Height in Feet and Inches       4' 11\" 5' 1\" 5' 3\" 5' 5\" 5' 7\" 5' 9\" 5' 11\" 6' 1\"  4' 11\" 5' 1\" 5' 3\" 5' 5\" 5' 7\" 5' 9\" 5' 11\" 6' 1\"   42 - 45 2.49 2.66 2.84 3.03 3.22 3.42 3.62 3.83 42 - 45 2.82 3.03 3.26 3.49 3.72 3.96 4.22 4.47   46 - 49 2.40 2.57 2.76 2.94 3.14 3.33 3.54 3.75 46 - 49 2.70 2.92 3.14 3.37 3. 61 3.85 4.10 4.36   50 - 53 2.31 2.48 2.66 2.85 3.04 3.24 3.45 3.66 50 - 53 2.58 2.80 3.02 3.25 3.49 3.73 3.98 4.24   54 - 57 2.21 2.38 2.57 2.75 2.95 3.14 3.35 3.56 54 - 57 2.46 2.67 2.89 3.12 3.36 3.60 3.85 4.11   58 - 61 2.10 2.28 2.46 2.65 2.84 3.04 3.24 3.45 58 - 61 2.32 2.54 2.76 2.99 3.23 3.47 3.72 3.98   62 - 65 1.99 2.17 2.35 2.54 2.73 2.93 3.13 3.34 62 - 65 2.19 2.40 2.62 2.85 3.09 3.33 3.58 3.84   66 - 69 1.88 2.05 2.23 2.42 2.61 2.81 3.02 3.23 66 - 69 2.04 2.26 2.48 2.71 2.95 3.19 3.44 3.70   70+ 1.82 1.99 2.17 2.36 2.55 2.75 2.95 3.16 70+ 1.97 2.19 2.41 2.64 2.87 3.12 3.37 3.62             Predicted Peak Expiratory Flow Rate                                       Height (in)  Female       Height (in) Male           Age 64 62 64 63 57 71 78 74 Age            21 344 357 372 387 402 417 432 446  60 62 64 66 68 70 72 74 76   25 337 352 366 381 396 411 426 441 25 447 476 [phone] [phone]   30 329 344 359 374 389 404 419 434 30 437 466 494 523 [phone] 667   35 322 337 351 366 381 396 411 426 35 426 455 484 512 541 [phone] 657   40 314 329 344 359 374 389 404 419 40 416 445 473 502 531 559 588 617 647   45 307 322 336 351 366 381 396 411 45 405 434 463 491 520 549 577 606 636   50 299 314 329 344 359 374 389 404 50 395 424 452 481 510 538 567 596 625   55 292 307 321 336 351 366 381 396 55 384 413 442 470 499 528 556 585 615   60 284 299 314 329 344 359 374 389 60 374 403 431 460 489 517 546 575 605   65 277 292 306 321 336 351 366 381 65 363 392 421 449 478 507 535 564 594   70 269 284 299 314 329 344 359 374 70 353 382 410 439 468 496 525 554 583   75 261 274 289 305 319 334 348 364 75 344 372 400 429 458 487 515 544 573   80 253 266 282 296 312 327 342 356 80 335 362 390 419 448 476 [phone]

## 2020-11-03 VITALS
TEMPERATURE: 98.9 F | HEART RATE: 66 BPM | BODY MASS INDEX: 49.49 KG/M2 | RESPIRATION RATE: 17 BRPM | DIASTOLIC BLOOD PRESSURE: 63 MMHG | WEIGHT: 268.96 LBS | SYSTOLIC BLOOD PRESSURE: 123 MMHG | OXYGEN SATURATION: 98 % | HEIGHT: 62 IN

## 2020-11-03 LAB
GLUCOSE BLD-MCNC: 179 MG/DL (ref 65–105)
GLUCOSE BLD-MCNC: 194 MG/DL (ref 65–105)
GLUCOSE BLD-MCNC: 225 MG/DL (ref 65–105)

## 2020-11-03 PROCEDURE — 94640 AIRWAY INHALATION TREATMENT: CPT

## 2020-11-03 PROCEDURE — 2580000003 HC RX 258: Performed by: STUDENT IN AN ORGANIZED HEALTH CARE EDUCATION/TRAINING PROGRAM

## 2020-11-03 PROCEDURE — 6360000002 HC RX W HCPCS: Performed by: EMERGENCY MEDICINE

## 2020-11-03 PROCEDURE — 97166 OT EVAL MOD COMPLEX 45 MIN: CPT

## 2020-11-03 PROCEDURE — 6370000000 HC RX 637 (ALT 250 FOR IP): Performed by: INTERNAL MEDICINE

## 2020-11-03 PROCEDURE — 97530 THERAPEUTIC ACTIVITIES: CPT

## 2020-11-03 PROCEDURE — 94660 CPAP INITIATION&MGMT: CPT

## 2020-11-03 PROCEDURE — 97535 SELF CARE MNGMENT TRAINING: CPT

## 2020-11-03 PROCEDURE — 6370000000 HC RX 637 (ALT 250 FOR IP): Performed by: STUDENT IN AN ORGANIZED HEALTH CARE EDUCATION/TRAINING PROGRAM

## 2020-11-03 PROCEDURE — 82947 ASSAY GLUCOSE BLOOD QUANT: CPT

## 2020-11-03 PROCEDURE — 99238 HOSP IP/OBS DSCHRG MGMT 30/<: CPT | Performed by: INTERNAL MEDICINE

## 2020-11-03 PROCEDURE — 97162 PT EVAL MOD COMPLEX 30 MIN: CPT

## 2020-11-03 RX ORDER — ALBUTEROL SULFATE 2.5 MG/3ML
2.5 SOLUTION RESPIRATORY (INHALATION) EVERY 6 HOURS PRN
Qty: 120 EACH | Refills: 3 | Status: SHIPPED | OUTPATIENT
Start: 2020-11-03

## 2020-11-03 RX ADMIN — INSULIN LISPRO 3 UNITS: 100 INJECTION, SOLUTION INTRAVENOUS; SUBCUTANEOUS at 09:15

## 2020-11-03 RX ADMIN — IPRATROPIUM BROMIDE AND ALBUTEROL SULFATE 1 AMPULE: .5; 3 SOLUTION RESPIRATORY (INHALATION) at 07:57

## 2020-11-03 RX ADMIN — INSULIN GLARGINE 30 UNITS: 100 INJECTION, SOLUTION SUBCUTANEOUS at 09:16

## 2020-11-03 RX ADMIN — ENOXAPARIN SODIUM 30 MG: 30 INJECTION SUBCUTANEOUS at 09:11

## 2020-11-03 RX ADMIN — ASPIRIN 81 MG: 81 TABLET, CHEWABLE ORAL at 09:11

## 2020-11-03 RX ADMIN — IPRATROPIUM BROMIDE AND ALBUTEROL SULFATE 1 AMPULE: .5; 3 SOLUTION RESPIRATORY (INHALATION) at 16:27

## 2020-11-03 RX ADMIN — INSULIN LISPRO 6 UNITS: 100 INJECTION, SOLUTION INTRAVENOUS; SUBCUTANEOUS at 11:58

## 2020-11-03 RX ADMIN — LOSARTAN POTASSIUM 25 MG: 25 TABLET, FILM COATED ORAL at 09:11

## 2020-11-03 RX ADMIN — CITALOPRAM 40 MG: 20 TABLET, FILM COATED ORAL at 09:11

## 2020-11-03 RX ADMIN — PANTOPRAZOLE SODIUM 40 MG: 40 TABLET, DELAYED RELEASE ORAL at 09:10

## 2020-11-03 RX ADMIN — CARVEDILOL 3.12 MG: 3.12 TABLET, FILM COATED ORAL at 09:11

## 2020-11-03 RX ADMIN — GABAPENTIN 300 MG: 300 CAPSULE ORAL at 14:00

## 2020-11-03 RX ADMIN — MAGNESIUM GLUCONATE 500 MG ORAL TABLET 400 MG: 500 TABLET ORAL at 09:11

## 2020-11-03 RX ADMIN — SODIUM CHLORIDE: 9 INJECTION, SOLUTION INTRAVENOUS at 01:58

## 2020-11-03 RX ADMIN — IPRATROPIUM BROMIDE AND ALBUTEROL SULFATE 1 AMPULE: .5; 3 SOLUTION RESPIRATORY (INHALATION) at 11:28

## 2020-11-03 RX ADMIN — CLOPIDOGREL 75 MG: 75 TABLET, FILM COATED ORAL at 09:11

## 2020-11-03 RX ADMIN — GABAPENTIN 300 MG: 300 CAPSULE ORAL at 09:11

## 2020-11-03 ASSESSMENT — PAIN DESCRIPTION - LOCATION
LOCATION: BACK;SHOULDER
LOCATION: BACK

## 2020-11-03 ASSESSMENT — PAIN SCALES - GENERAL
PAINLEVEL_OUTOF10: 0
PAINLEVEL_OUTOF10: 7
PAINLEVEL_OUTOF10: 7
PAINLEVEL_OUTOF10: 0

## 2020-11-03 ASSESSMENT — PAIN DESCRIPTION - PAIN TYPE
TYPE: CHRONIC PAIN
TYPE: CHRONIC PAIN

## 2020-11-03 ASSESSMENT — PAIN DESCRIPTION - FREQUENCY: FREQUENCY: CONTINUOUS

## 2020-11-03 ASSESSMENT — PAIN DESCRIPTION - ORIENTATION: ORIENTATION: LOWER;LEFT

## 2020-11-03 NOTE — PROGRESS NOTES
[facility]  Internal Medicine Teaching Residency Program  Inpatient Daily Progress Note  ______________________________________________________________________________    Patient: Fabiana Patrick  YOB: 1950   [id number]    Acct: [de-identified]     Room: [address] [place] [address]  Admit date: 10/30/2020  Today's date: 11/03/20  Number of days in the hospital: 4    SUBJECTIVE   Admitting Diagnosis: Acute respiratory distress  CC:  Blurred vision, shortness of breath, hyperglycemia  Pt examined at bedside. Chart & results reviewed. Patient's mentation improved. Patient on 2L NC. Glucose 179. On lantus 30 and HD ISS. Patient reports feeling much better. Can be discharged today. ROS:  Constitutional:  negative for chills, fevers, sweats  Respiratory:  negative for cough, dyspnea on exertion, hemoptysis, shortness of breath, wheezing  Cardiovascular:  negative for chest pain, chest pressure/discomfort, lower extremity edema, palpitations  Gastrointestinal:  negative for abdominal pain, constipation, diarrhea, nausea, vomiting  Neurological:  negative for dizziness, headache  BRIEF HISTORY     The patient is a pleasant 78 y.o. female with PMH of uncontrolled DM type II, HTN, CKD stage III, COPD, HIGINIO, obesity presents with a chief complaint of blurred vision, shortness of breath and hyperglycemia for past 2 days. Associated with urinary frequency. She reported that her glucose reason for any defined range for past 2 days. Her Lantus was recently increased from 42 to 50 units daily. Also takes Metformin and short-acting insulin on sliding scale.     Has history of chronic back pain from lumbar spondylosis and sacroiliitis, follows pain management. On Percocets for pain. Was started on methylprednisone 4 mg daily for pain.     Initial ER labs:  Blood glucose 420, bicarb 26, anion gap 13, beta hydroxybutyrate 0.11, creatinine 1. 19.   VBG: pH 7.315, PCO2 59.4, input(s): PROTIME, INR in the last 72 hours. APTT: No results for input(s): APTT in the last 72 hours. CARDIAC ENZYMES: No results for input(s): CKMB, CKMBINDEX, TROPONINI in the last 72 hours. Invalid input(s): CKTOTAL;3  FASTING LIPID PANEL:  Lab Results   Component Value Date    CHOL 264 (H) 07/02/2020    HDL 34 (L) 07/02/2020    TRIG 340 (H) 07/02/2020     LIVER PROFILE:   No results for input(s): AST, ALT, ALB, BILIDIR, BILITOT, ALKPHOS in the last 72 hours. MICROBIOLOGY:   Lab Results   Component Value Date/Time    CULTURE NO SIGNIFICANT GROWTH 03/14/2019 04:35 PM       Imaging:    Xr Chest (2 Vw)    Result Date: 10/30/2020  Unremarkable single upright portable AP view of the chest.       ASSESSMENT & PLAN     ASSESSMENT / PLAN:     This is a 78 y.o. female who presented with hyperglycemia and found to have acute on chronic respiratory failure. Patient admitted to inpatient status for management of DM type II with hyperglycemia and acute on chronic respiratory failure    1. Acute on chronic hypoxic respiratory failure likely multifactorial from HIGINIO, respiratory depression from opioid usage:  · Improving. On 2L NC.   · Decrease Percocet to 1 tablet every 6 as needed(Prefer non-opiates)  · Continue intermittent BiPAP alternating with nasal cannula O2. · Repeat ABG shows  pH 7.2, PO2 63.9, HCO3 28.9, PCO2 71.4. Suggestive of acute hypercarbia. Improving from the VBG done earlier. · RT aerosol protocol. 2. HIGINIO/OHS overlap syndrome:  ? Patient possibly noncompliant with CPAP at home. ? Patient did not receive any CPAP/BiPAP overnight, possible reason for decompensation. ? Continue intermittent BiPAP monitoring with nasal cannula O2.  ? Follow-up pulmonology outpatient. 3. Uncontrolled diabetes mellitus type 2 with hyperglycemia:  · Not in DKA, normal AG, bicarb normal, beta hydroxybutyrate normal.  · HbA1c 9/14/2020 was 10.6. · Hold Metformin for now.   · Start Lantus 30 units daily and continue high-dose ICS. · Hypoglycemia protocol. · POCT glucose checks as needed. · Hold metformin  4. Hypertension:  · Stable. · Resume Coreg and Cozaar. · Hold Lasix at this time. 5. YAJAIRA on CKD stage III likely secondary to dehydration:  · IV fluids at 125 mL/h. · Creatinine trending up. 6. COPD: Not in exacerbation. RT aerosol protocol. 7. Chronic lower back pain from lumbar spondylosis and sacroiliitis: Pain management preferably with non opiates. DVT ppx : Lovenox injection 30 mg subcutaneous twice daily  GI ppx: Protonix tablet 40 mg oral twice daily    PT/OT: Consulted. Discharge Planning / SW: Likely today    Tien Rehman MD  Internal Medicine Resident, PGY-1  [address], [place]  11/3/2020, 12:45 PM   Attending Physician Statement  I have discussed the care of Thompson Wood, including pertinent history and exam findings,  with the resident. I have seen and examined the patient and the key elements of all parts of the encounter have been performed by me. I agree with the assessment, plan and orders as documented by the resident with additions . Treatment plan Discussed with nursing staff in detail , all questions answered . Electronically signed by Yasmin Terrazas MD on   11/3/20 at 2:48 PM EST    Please note that this chart was generated using voice recognition Dragon dictation software. Although every effort was made to ensure the accuracy of this automated transcription, some errors in transcription may have occurred. Please note that this chart was generated using voice recognition Dragon dictation software. Although every effort was made to ensure the accuracy of this automated transcription, some errors in transcription may have occurred.

## 2020-11-03 NOTE — PROGRESS NOTES
Physical Therapy    Facility/Department: [facility] 4B STEPDOWN  Initial Assessment    NAME: Nadia Burnham  : 1950  MRN: [id number]    Date of Service: 11/3/2020    Discharge Recommendations:  Patient would benefit from continued therapy after discharge   PT Equipment Recommendations  Equipment Needed: Yes  Mobility Devices: Christelle Retort: Rolling  Other: Pt owns rollator to navigate household distances. Writer advise to use RW to provide more stability to assist with transfers and navigate household distances    Assessment   Body structures, Functions, Activity limitations: Decreased functional mobility ; Decreased strength;Decreased ROM; Decreased safe awareness;Decreased endurance;Decreased balance;Decreased coordination; Increased pain  Assessment: Pt performed bed mobility with Nafisa, STS transfers with RW 3x with Nafisa, and static sitting balance at EOB x SBA. Pt demo static standing balance with RW for 1 min each trial.  1st trial pt c/o dizziness and L leg \"gives out\" however no L knee buckling and LLE instability noted, SpO2 dropped to 87 but shortly returned to 90s range, RN notified. Last 2 standing trials SpO2 maintained in 90s range and no c/o dizziness throughout noted. Pt will continued PT services during admission to address these functional deficits and return pt to prior level of function and maximize safety. Prognosis: Good  Decision Making: Medium Complexity  PT Education: Goals;PT Role;Plan of Care;General Safety;Transfer Training;Equipment; Functional Mobility Training  REQUIRES PT FOLLOW UP: Yes  Activity Tolerance  Activity Tolerance: Patient limited by pain; Patient limited by endurance       Patient Diagnosis(es): The primary encounter diagnosis was Hyperglycemia. A diagnosis of Nausea was also pertinent to this visit.      has a past medical history of Allergic rhinitis, Anxiety, Arthritis, Asthma, Atrial fibrillation ([address] Back pain, Benign hypertension with CKD (chronic kidney disease) stage III, CAD (coronary artery disease), Caffeine use, CHF (congestive heart failure) ([facility] [address] Chronic kidney disease, CKD (chronic kidney disease) stage 3, GFR 30-59 ml/min, COPD (chronic obstructive pulmonary disease) ([address] Degeneration of lumbar or lumbosacral intervertebral disc, Depression, DM (diabetes mellitus) ([facility] [address] Emphysema of lung ([facility] [address] Gastritis, GERD (gastroesophageal reflux disease), Hearing loss, Hematuria, Hiatal hernia, HTN (hypertension), benign, Hypertriglyceridemia, Incontinence of urine, Knee arthropathy, Lumbosacral spondylosis without myelopathy, Migraine headache, Mumps, Neuropathy, Obesity, On home oxygen therapy, HIGINIO on CPAP, Otitis media, Secondary diabetes mellitus with stage 3 chronic kidney disease (GFR 30-59) ([facility] [address] Stroke ([facility] [address] Tinnitus, Type 2 diabetes mellitus without complication ([facility] [address] Type II or unspecified type diabetes mellitus without mention of complication, not stated as uncontrolled, UTI (lower urinary tract infection), and Varicose vein. has a past surgical history that includes Cholecystectomy (2007); Carpal tunnel release (Right); Tympanoplasty; Dilation & curettage (1979); Cystocopy (9/25/2013); Cataract removal with implant (Bilateral); Tonsillectomy; Incontinence surgery; ablation of dysrhythmic focus (2000); Upper gastrointestinal endoscopy (03/2016); eye surgery; Tubal ligation; other surgical history (09/10/15); Insertable Cardiac Monitor (12/04/2015); Tympanoplasty; Colonoscopy; Colonoscopy (04/10/2017); pr colsc flx w/removal lesion by hot bx forceps (N/A, 4/10/2017); and Tympanostomy tube placement (08/21/2017).     Restrictions  Restrictions/Precautions  Restrictions/Precautions: General Precautions  Required Braces or Orthoses?: No  Position Activity Restriction  Other position/activity restrictions: up with assistance, recent drops in SPO2  Vision/Hearing  Vision: Impaired  Vision Exceptions: Wears glasses at all times  Hearing: Exceptions to WFL  Hearing Exceptions: Left hearing aid     Subjective  General  Chart Reviewed: Yes  Patient assessed for rehabilitation services?: Yes  Family / Caregiver Present: No  Follows Commands: Within Functional Limits  Subjective  Subjective: RN and pt agreeable to PT eval.  Pt supine with HOB elevated ~30 deg. Pt is currently on 2L NC. Pt is cooperative and pleasant throughout. Pain Screening  Patient Currently in Pain: Yes  Pain Assessment  Pain Assessment: 0-10  Pain Level: 7  Pain Type: Chronic pain  Pain Location: Back; Shoulder(chronic LBP)  Pain Orientation: Lower; Left  Pain Frequency: Continuous  Non-Pharmaceutical Pain Intervention(s): Ambulation/Increased Activity;Repositioned  Vital Signs  Patient Currently in Pain: Yes       Orientation  Orientation  Overall Orientation Status: Within Functional Limits  Social/Functional History  Social/Functional History  Lives With: Alone  Type of Home: Apartment  Home Layout: Multi-level  Home Access: Elevator, Level entry(pt reports building is handicap accessable)  Bathroom Shower/Tub: Tub/Shower unit, Shower chair with back  Bathroom Toilet: Handicap height  Bathroom Equipment: Hand-held shower  Bathroom Accessibility: Accessible  Home Equipment: 4 wheeled walker, Electric scooter(pt reports using 4 wheeled walker in the home and the electric scooter when in the community)  Receives Help From: Personal care attendant, Home health, Family(aide comes 3x/wk for 3 hours to help with homemaking tasks. Nurse comes once a month to help with health management.   Pt's cousin lives close and is able to help PRN)  ADL Assistance: Independent  Homemaking Assistance: Needs assistance  Homemaking Responsibilities: No  Ambulation Assistance: Independent  Transfer Assistance: Independent  Active : No  Patient's  Info: cab and cousin drives  Mode of Transportation: Cab  Occupation: On disability  Leisure & Hobbies: playing cards with cousin  Additional Comments: Pt reported she has been on 2L O2 at home as needed and use Bipap at night  Cognition   Cognition  Overall Cognitive Status: WFL    Objective          AROM RLE (degrees)  RLE AROM: WFL  AROM LLE (degrees)  LLE AROM : WFL  LLE General AROM: AROM against gravity noted at hip, knee, ankle  AROM RUE (degrees)  RUE AROM : WFL  RUE General AROM: AROM against gravity noted at hip, knee, ankle  AROM LUE (degrees)  LUE AROM : WFL  Strength RLE  R Hip Flexion: 4-/5  R Knee Flexion: 4-/5  R Knee Extension: 4-/5  R Ankle Dorsiflexion: 4+/5  R Ankle Plantar flexion: 4+/5  Strength LLE  L Hip Flexion: 3+/5  L Knee Flexion: 3+/5  L Knee Extension: 3+/5  L Ankle Dorsiflexion: 4/5  L Ankle Plantar Flexion: 4/5  Strength RUE  Comment: unable to formally assessed, AROM against gravity noted at shoulder, elbow, wrist, hand  Strength LUE  Comment: unable to formally assessed, AROM against gravity noted at shoulder, elbow, wrist, hand     Sensation  Overall Sensation Status: Impaired(pt reported numbness/tingling B hands and feet)  Bed mobility  Rolling to Left: Stand by assistance  Rolling to Right: Stand by assistance  Supine to Sit: Minimal assistance  Sit to Supine: Minimal assistance  Comment: HOB elevated, use of handrails to assist  Transfers  Sit to Stand: Minimal Assistance;2 Person Assistance  Stand to sit: Minimal Assistance;2 Person Assistance  Comment: RW provided for support with STS transfers. Performed STS with RW with MinAx2 3x. Slight dizziness and SpO2 dropped from 90s to 87 but shortly increased to 90s range after cuing pt to perform deep breathing technique. Pt is currently on 2L NC. Ambulation  Ambulation?: No  Stairs/Curb  Stairs?: No     Balance  Posture: Fair  Sitting - Static: Good;-  Sitting - Dynamic: Fair;+  Standing - Static: Fair;-  Comments: Standing balance assessed with RW x Nafisa for safety. Sitting balance x SBA.   Pt reported L leg \"give out\" however no L knee buckling noted in static standing balance. Pt performed STS 3x at EOB and was able to maintain static standing balance for 1 min each trial.  First standing trial pt c/o mild lightheaded and SpO2 dropped to 87 but shortly return to 90s range after deep breathing technique performed. RN was notified. No SpO2 drop below 90s range and no c/o L knee \"give out\" or dizziness noted last 2 standing trials.         Plan   Plan  Times per week: 5-6x per week  Current Treatment Recommendations: Strengthening, Functional Mobility Training, Transfer Training, Gait Training, Balance Training, Endurance Training, Pain Management, Safety Education & Training, Patient/Caregiver Education & Training, Equipment Evaluation, Education, & procurement  Safety Devices  Type of devices: Call light within reach, Nurse notified, Left in bed, Patient at risk for falls, Gait belt  Restraints  Initially in place: No           AM-PAC Score  AM-PAC Inpatient Mobility Raw Score : 14 (11/03/20 1204)  AM-PAC Inpatient T-Scale Score : 38.1 (11/03/20 1204)  Mobility Inpatient CMS 0-100% Score: 61.29 (11/03/20 1204)  Mobility Inpatient CMS G-Code Modifier : CL (11/03/20 1204)          Goals  Short term goals  Time Frame for Short term goals: 10 visits  Short term goal 1: Pt will demo static/dynamic standing balance to good to promote independence with ADLs  Short term goal 2: Pt will tolerate at least 30 minutes of exercise program to maximize rehab potential  Short term goal 3: Pt will perform bed mobility independently  Short term goal 4: Pt will perform functional transfers x SBA  Patient Goals   Patient goals : to return home       Therapy Time   Individual Concurrent Group Co-treatment   Time In 0949         Time Out 1037         Minutes 48         Timed Code Treatment Minutes: [address], [facility]

## 2020-11-03 NOTE — PLAN OF CARE
NONINVASIVE VENTILATION    PROVIDE OPTIMAL VENTILATION/ACCEPTABLE SPO2   IMPLEMENT NONINVASIVE VENTILATION PROTOCOL   MAINTAIN ACCEPTABLE SPO2   ASSESS SKIN INTEGRITY/BREAKDOWN SCORE   PATIENT EDUCATION AS NEEDED   BIPAP AS NEEDED       BRONCHOSPASM/BRONCHOCONSTRICTION     [x]         IMPROVE AERATION/BREATH SOUNDS  [x]   ADMINISTER BRONCHODILATOR THERAPY AS APPROPRIATE  [x]   ASSESS BREATH SOUNDS  [x]   IMPLEMENT AEROSOL/MDI PROTOCOL  [x]   PATIENT EDUCATION AS NEEDED

## 2020-11-03 NOTE — PROGRESS NOTES
Occupational Therapy   Occupational Therapy Initial Assessment  Date: 11/3/2020   Patient Name: Artemio Salmon  MRN: [id number]     : 1950    Date of Service: 11/3/2020    Discharge Recommendations:     OT Equipment Recommendations  Equipment Needed: Yes  Mobility Devices: ADL Assistive Devices  ADL Assistive Devices: Toileting - Drop Arm Commode, Heavy Duty Drop Arm Commode Further therapy recommended at discharge. Assessment   Performance deficits / Impairments: Decreased functional mobility ; Decreased ADL status; Decreased strength;Decreased endurance;Decreased ROM  Assessment: Pt doffed and donned brief and performed pericare at Max A standing EOB. Max A was required so pt could maintain B hand placement on RW d/t balance, strength, and endurance limitations. The pt's decreased endurance, balance, and strength limit her safe participation in functional transfers/mobility and ADLs. OT is warranted to address these limitations, educate on safe transfer/mobility techniques and EC/WS techniques, and to assess for the need of AD. Prognosis: Good  Decision Making: Medium Complexity  Patient Education: Pt educated on OT role, purpose of OT eval, safe transfer techniques, and pursed lip breathing - good return  REQUIRES OT FOLLOW UP: Yes  Activity Tolerance  Activity Tolerance: Patient Tolerated treatment well;Patient limited by fatigue;Treatment limited secondary to medical complications (Pt demonstrated signs and symptoms of SOB. SPO2 dropped to 88% during activity but returned to 94% following pursed lip breathing)  Safety Devices  Safety Devices in place: Yes  Type of devices: Call light within reach;Nurse notified; Left in bed;Gait belt           Patient Diagnosis(es): The primary encounter diagnosis was Hyperglycemia. A diagnosis of Nausea was also pertinent to this visit.      has a past medical history of Allergic rhinitis, Anxiety, Arthritis, Asthma, Atrial fibrillation ([address] Back pain, Benign hypertension with CKD (chronic kidney disease) stage III, CAD (coronary artery disease), Caffeine use, CHF (congestive heart failure) ([facility] [address] Chronic kidney disease, CKD (chronic kidney disease) stage 3, GFR 30-59 ml/min, COPD (chronic obstructive pulmonary disease) ([address] Degeneration of lumbar or lumbosacral intervertebral disc, Depression, DM (diabetes mellitus) ([facility] [address] Emphysema of lung ([facility] [address] Gastritis, GERD (gastroesophageal reflux disease), Hearing loss, Hematuria, Hiatal hernia, HTN (hypertension), benign, Hypertriglyceridemia, Incontinence of urine, Knee arthropathy, Lumbosacral spondylosis without myelopathy, Migraine headache, Mumps, Neuropathy, Obesity, On home oxygen therapy, HIGINIO on CPAP, Otitis media, Secondary diabetes mellitus with stage 3 chronic kidney disease (GFR 30-59) ([facility] [address] Stroke ([facility] [address] Tinnitus, Type 2 diabetes mellitus without complication ([facility] [address] Type II or unspecified type diabetes mellitus without mention of complication, not stated as uncontrolled, UTI (lower urinary tract infection), and Varicose vein. has a past surgical history that includes Cholecystectomy (2007); Carpal tunnel release (Right); Tympanoplasty; Dilation & curettage (1979); Cystocopy (9/25/2013); Cataract removal with implant (Bilateral); Tonsillectomy; Incontinence surgery; ablation of dysrhythmic focus (2000); Upper gastrointestinal endoscopy (03/2016); eye surgery; Tubal ligation; other surgical history (09/10/15); Insertable Cardiac Monitor (12/04/2015); Tympanoplasty; Colonoscopy; Colonoscopy (04/10/2017); pr colsc flx w/removal lesion by hot bx forceps (N/A, 4/10/2017); and Tympanostomy tube placement (08/21/2017).            Restrictions  Restrictions/Precautions  Restrictions/Precautions: General Precautions  Required Braces or Orthoses?: No  Position Activity Restriction  Other position/activity restrictions: up with assistance, recent drops in SPO2    Subjective   General  Patient assessed for rehabilitation services?: Yes  Family / Caregiver Present: No  General Comment  Comments: RN ok'd pt for OT eval, pt pleasant and cooperative throughout session  Patient Currently in Pain: Yes  Pain Assessment  Pain Assessment: Faces  Pain Level: 7  Pain Type: Chronic pain  Pain Location: Back  Pain Orientation: Lower; Left  Pain Frequency: Continuous  Non-Pharmaceutical Pain Intervention(s): Distraction; Ambulation/Increased Activity;Repositioned  Response to Pain Intervention: Patient Satisfied  O2 Device: Nasal cannula  O2 Flow Rate (L/min): 2 L/min  Social/Functional History  Social/Functional History  Lives With: Alone  Type of Home: Apartment  Home Layout: Multi-level  Home Access: Elevator, Level entry(pt reports building is handicap accessible)  Bathroom Shower/Tub: Tub/Shower unit, Shower chair with back  Bathroom Toilet: Handicap height  Bathroom Equipment: Hand-held shower  Bathroom Accessibility: Accessible  Home Equipment: 4 wheeled walker, Electric scooter(pt reports using 4 wheeled walker in the home and the electric scooter when in the community)  Receives Help From: Personal care attendant, Home health, Family(aide comes 3x/wk for 3 hours to help with homemaking tasks. Nurse comes once a month to help with health management.   Pt's cousin lives close and is able to help PRN)  ADL Assistance: Independent  Homemaking Assistance: Needs assistance  Homemaking Responsibilities: No  Ambulation Assistance: Independent  Transfer Assistance: Independent  Active : No  Patient's  Info: cab and cousin drives  Mode of Transportation: Cab  Occupation: On disability  Leisure & Hobbies: playing cards with cousin  Additional Comments: Pt reported she has been on 2L O2 at home as needed and use Bipap at night       Objective   Vision Exceptions: Wears glasses at all times  Hearing: Exceptions to [facility]  Hearing Exceptions: Left hearing aid(pt donned hearing aide at the begining of the session)          Balance  Sitting Balance: Stand by assistance(~ 15 min cumulative)  Standing Balance: Minimal assistance  Standing Balance  Time: ~1 min x3  Activity: standing EOB using RW with 2 rest breaks during brief/clothing management and pericare for toileting. Functional Mobility  Functional Mobility Comments: Functional mobility not performed d/t fatigue  ADL  Feeding: Independent;Setup; Increased time to complete  Grooming: Modified independent ;Setup; Increased time to complete  UE Bathing: Setup; Increased time to complete;Stand by assistance  LE Bathing: Maximum assistance;Setup; Increased time to complete  UE Dressing: Stand by assistance;Setup; Increased time to complete  LE Dressing: Maximum assistance;Setup; Increased time to complete  Toileting: Maximum assistance;Setup; Increased time to complete(Pt's bed soiled upon arrival.  Pt transfered from supine in bed to standing EOB.   Therapy performed pericare and brief/clothing management while pt stood EOB d/t patient having B hands on RW d/t decreased strength and balance)  Tone RUE  RUE Tone: Normotonic  Tone LUE  LUE Tone: Normotonic  Coordination  Movements Are Fluid And Coordinated: Yes     Bed mobility  Rolling to Left: Stand by assistance  Rolling to Right: Stand by assistance  Supine to Sit: Minimal assistance(Min A at trunk)  Sit to Supine: Minimal assistance(Min A at trunk)  Transfers  Sit to stand: Minimal assistance;2 Person assistance  Stand to sit: Contact guard assistance;2 Person assistance     Cognition  Overall Cognitive Status: WFL        Sensation  Overall Sensation Status: Impaired(pt reported numbness/tingling B hands and feet)        LUE AROM (degrees)  LUE AROM : WFL  Left Hand AROM (degrees)  Left Hand AROM: WFL  RUE AROM (degrees)  RUE AROM : WFL  Right Hand AROM (degrees)  Right Hand AROM: WFL  LUE Strength  Gross LUE Strength: WFL  L Hand General: 4+/5  RUE Strength  Gross RUE Strength: WFL  R Hand General: 4+/5                   Plan   Plan  Times per week: 3-4x/wk  Current Treatment Recommendations: Strengthening, ROM, Balance Training, Functional Mobility Training, Endurance Training, Safety Education & Training, Patient/Caregiver Education & Training, Equipment Evaluation, Education, & procurement, Self-Care / ADL      AM-PAC Score        AM-PAC Inpatient Daily Activity Raw Score: 17 (11/03/20 1212)  AM-PAC Inpatient ADL T-Scale Score : 37.26 (11/03/20 1212)  ADL Inpatient CMS 0-100% Score: 50.11 (11/03/20 1212)  ADL Inpatient CMS G-Code Modifier : CK (11/03/20 1212)    Goals  Short term goals  Time Frame for Short term goals: By discharge, pt will  Short term goal 1: perform UB ADLs at supervision  Short term goal 2: perform LB ADLs at Mod A using AD PRN  Short term goal 3: perform toileting at Mod A using AD PRN  Short term goal 4: demo 5+ min static standing at [facility] for increased engagement in ADLs  Short term goal 5: tolerate 20+ min functional activities at SBA using EC/WS techniques PRN  Short term goal 6: update OTR as pt progresses       Therapy Time   Individual Concurrent Group Co-treatment   Time In 0949         Time Out 1037         Minutes 48         Timed Code Treatment Minutes: 23 Minutes     Co-eval with [id number] Darin So

## 2020-11-03 NOTE — DISCHARGE INSTR - COC
nodule R91.1    Neuropathy G62.9    Obstructive sleep apnea syndrome G47.33    Acute on chronic respiratory failure with hypoxia and hypercapnia ([facility]) J96.21, J96.22    Asthma with COPD ([facility]) J44.9    Gastroesophageal reflux disease with esophagitis K21.00    Morbid obesity with BMI of 40.0-44.9, adult ([facility]) E66.01, Z68.41    Congestive heart failure ([facility]) I50.9    Stage 3 chronic kidney disease N18.30    Benign hypertension with CKD (chronic kidney disease) stage III I12.9, N18.30    Secondary diabetes mellitus with stage 3 chronic kidney disease (GFR 30-59) ([facility]) E13.22, N18.30    CKD (chronic kidney disease) stage 3, GFR 30-59 ml/min N18.30    Episode of altered cognition R41.89    Lumbar radiculopathy, chronic M54.16    Abdominal spasms R10.9    Sessile colonic polyp K63.5    Chest pain at rest R07.9    Hyperglycemia R73.9    Acute respiratory distress R06.03    Uncontrolled type 2 diabetes mellitus with hyperglycemia ([facility]) E11.65       Isolation/Infection:   Isolation            No Isolation          Patient Infection Status       Infection Onset Added Last Indicated Last Indicated By Review Planned Expiration Resolved Resolved By    None active    Resolved    COVID-19 Rule Out 07/01/20 07/01/20 07/01/20 COVID-19 (Ordered)   07/01/20 Rule-Out Test Resulted            Nurse Assessment:  Last Vital Signs: /63   Pulse 66   Temp 98.9 °F (37.2 °C) (Temporal)   Resp 17   Ht 5' 2\" (1.575 m)   Wt 268 lb 15.4 oz (122 kg)   LMP  (LMP Unknown)   SpO2 97%   BMI 49.19 kg/m²     Last documented pain score (0-10 scale): Pain Level: 7  Last Weight:   Wt Readings from Last 1 Encounters:   11/03/20 268 lb 15.4 oz (122 kg)     Mental Status:  oriented    IV Access:  - None    Nursing Mobility/ADLs:  Walking   Assisted  Transfer  Assisted  Bathing  Assisted  Dressing  Assisted  Toileting  Assisted  Feeding  Independent  Med Admin  Independent  Med Delivery   whole    Wound Care Documentation and Therapy:        Elimination:  Continence:   · Bowel: {YES   · Bladder: Yes  Urinary Catheter: None   Colostomy/Ileostomy/Ileal Conduit: No       Date of Last BM: 11/01/2020    Intake/Output Summary (Last 24 hours) at 11/3/2020 1419  Last data filed at 11/3/2020 0920  Gross per 24 hour   Intake 4625.33 ml   Output 1850 ml   Net 2775.33 ml     I/O last 3 completed shifts: In: 3278 [P.O.:500; I.V.:3052]  Out: 1350 [Urine:1350]    Safety Concerns:     None    Impairments/Disabilities:      None    Nutrition Therapy:  Current Nutrition Therapy:   - Oral Diet:  Carb Control 3 carbs/meal (1500kcals/day)    Routes of Feeding: Oral  Liquids:  Thin Liquids  Daily Fluid Restriction: no  Last Modified Barium Swallow with Video (Video Swallowing Test): not done    Treatments at the Time of Hospital Discharge:   Respiratory Treatments:Bipap at bedtime , 4L via nc   Oxygen Therapy:  4  Ventilator:    - BiPAP   IPAP: 20 cmH20, CPAP/EPAP: 10 cmH2O {[facility] CPAP Settings:[id number]}    Rehab Therapies: Physical Therapy and Occupational Therapy  Weight Bearing Status/Restrictions: No weight bearing restirctions  Other Medical Equipment (for information only, NOT a DME order):  walker  Other Treatments: ***    Patient's personal belongings (please select all that are sent with patient):  clothing    RN SIGNATURE:  femi    CASE MANAGEMENT/SOCIAL WORK SECTION    Inpatient Status Date: 10/30/2020    Readmission Risk Assessment Score:  Readmission Risk              Risk of Unplanned Readmission:        29           Discharging to Facility/ Agency   Name:   [facility] [address], [place] [address]       Phone: [phone]       Fax: [phone]        ·   · Address:  · Phone:  · Fax:    Dialysis Facility (if applicable)   · Name:  · Address:  · Dialysis Schedule:  · Phone:  · Fax:    / signature: Electronically signed by Maria E Aguilera RN on 11/3/20 at 2:44 PM EST    PHYSICIAN SECTION    Prognosis: Fair    Condition at Discharge: Stable    Rehab Potential (if transferring to Rehab): Fair    Recommended Labs or Other Treatments After Discharge: as per PCP    Physician Certification: I certify the above information and transfer of Mora Fernandez  is necessary for the continuing treatment of the diagnosis listed and that she requires Home Care for greater 30 days.      Update Admission H&P: No change in H&P    PHYSICIAN SIGNATURE:  Electronically signed by Mei Mcduffie MD on 11/3/20 at 2:20 PM EST

## 2020-11-03 NOTE — PLAN OF CARE
[facility]OWER[facility] Hyperglycemia [R73.9] . Vitals:    11/03/20 0757   BP:    Pulse:    Resp: 18   Temp:    SpO2: 96%   . Patients home meds are   Prior to Admission medications    Medication Sig Start Date End Date Taking? Authorizing Provider   furosemide (LASIX) 20 MG tablet TAKE 1 TAB BY MOUTH ONCE A DAY AS NEEDED ( WEIGHT GAIN 3 LBS OVERNIGHT )  10/31/20   Isamar Peacock MD   TRUE METRIX BLOOD GLUCOSE TEST strip THREE TIMES A DAY  10/31/20   Isamar Peacock MD   oxyCODONE-acetaminophen (PERCOCET) 5-325 MG per tablet Take 1 tablet by mouth See Admin Instructions for 30 days. Intended supply: 30 days. 1 tab 3-4 times a day prn 11/1/20 12/1/20  Alex Reyna MD   methylPREDNISolone (MEDROL DOSEPACK) 4 MG tablet Take by mouth. 10/28/20 11/3/20  Isamar Peacock MD   topiramate (TOPAMAX) 100 MG tablet TAKE 1 TABLET BY MOUTH NIGHTLY  10/19/20   VERONIKA Hough - CNP   clopidogrel (PLAVIX) 75 MG tablet TAKE 1 TAB BY MOUTH ONCE A DAY ( IN THE MORNING ) -THIS MEDICINE MAY BE TAKENWITH OR WITHOUT FOOD  10/17/20   Isamar Peacock MD   LANTUS SOLOSTAR 100 UNIT/ML injection pen INJECT 42 UNITS INTO THE SKIN 2 TIMES DAILY  10/8/20   Isamar Peacock MD   zoster recombinant adjuvanted vaccine [facility]) 50 MCG/0.5ML SUSR injection 50 MCG IM then repeat 2-6 months.   Patient not taking: Reported on 10/28/2020 9/14/20 9/14/21  Isamar Peacock MD   Icosapent Ethyl (VASCEPA) 1 g CAPS capsule Take 1 capsule by mouth 2 times daily 9/14/20   Isamar Peacock MD   vitamin B-12 (CYANOCOBALAMIN) 100 MCG tablet Take 50 mcg by mouth daily    Historical Provider, MD   isosorbide dinitrate (ISORDIL) 30 MG tablet Take 30 mg by mouth    Historical Provider, MD   ULTICARE MINI PEN NEEDLES 31G X 6 MM MISC USE AS DIRECTED  8/14/20   Isamar Peacock MD   metFORMIN (GLUCOPHAGE) 1000 MG tablet TAKE 1 TAB BY MOUTH TWICE A DAY ( IN THE MORNING AND BEDTIME )  8/10/20   Isamar Peacock MD   ofloxacin []  71-80% []  Less than or equal to 70%  or unable to perform []  Unable due to Respiratory Distress   Dyspnea re []  Patient Baseline []  No SOB []  No SOB [x]  SOB on exertion []  SOB min activity []  At rest/acute   e FEV% Predicted       []  NA []  Above 69%  []  Unable []  Above 60-69%  []  Unable []  Above 50-59%  []  Unable []  Above 35-49%  []  Unable []  Less than 35%  []  Unable                 []  Hyperinflation Assessment  Score 1 2 3   CXR and Breath Sounds   []  Clear []  No atelectasis  Basilar aeration []  Atelectasis or absent basilar breath sounds   Incentive Spirometry Volume  (Per IBW)   []  Greater than or equal to 15ml/Kg []  less than 15ml/Kg []  less than 15ml/Kg   Surgery within last 2 weeks []  None or general   []  Abdominal or thoracic surgery  []  Abdominal or thoracic   Chronic Pulmonary Historyre []  No []  Yes []  Yes     []  Secretion Management Assessment  Score 1 2 3   Bilateral Breath Sounds   []  Occasional Rhonchi []  Scattered Rhonchi []  Course Rhonchi and/or poor aeration   Sputum    []  Small amount of thin secretions []  Moderate amount of viscous secretions []  Copius, Viscious Yellow/ Secretions   CXR as reported by physician []  clear  []  Unavailable []  Infiltrates and/or consolidation  []  Unavailable []  Mucus Plugging and or lobar consolidation  []  Unavailable   Cough []  Strong, productive cough []  Weak productive cough []  No cough or weak non-productive cough   FRED BOWER  8:12 AM                            FEMALE                                  MALE                            FEV1 Predicted Normal Values                        FEV1 Predicted Normal Values          Age                                     Height in Feet and Inches       Age                                     Height in Feet and Inches       4' 11\" 5' 1\" 5' 3\" 5' 5\" 5' 7\" 5' 9\" 5' 11\" 6' 1\"  4' 11\" 5' 1\" 5' 3\" 5' 5\" 5' 7\" 5' 9\" 5' 11\" 6' 1\"   42 - 45 2.49 2.66 2.84 3.03 3.22 3.42 3. 62 3.83 42 - 45 2.82 3.03 3.26 3.49 3.72 3.96 4.22 4.47   46 - 49 2.40 2.57 2.76 2.94 3.14 3.33 3.54 3.75 46 - 49 2.70 2.92 3.14 3.37 3.61 3.85 4.10 4.36   50 - 53 2.31 2.48 2.66 2.85 3.04 3.24 3.45 3.66 50 - 53 2.58 2.80 3.02 3.25 3.49 3.73 3.98 4.24   54 - 57 2.21 2.38 2.57 2.75 2.95 3.14 3.35 3.56 54 - 57 2.46 2.67 2.89 3.12 3.36 3.60 3.85 4.11   58 - 61 2.10 2.28 2.46 2.65 2.84 3.04 3.24 3.45 58 - 61 2.32 2.54 2.76 2.99 3.23 3.47 3.72 3.98   62 - 65 1.99 2.17 2.35 2.54 2.73 2.93 3.13 3.34 62 - 65 2.19 2.40 2.62 2.85 3.09 3.33 3.58 3.84   66 - 69 1.88 2.05 2.23 2.42 2.61 2.81 3.02 3.23 66 - 69 2.04 2.26 2.48 2.71 2.95 3.19 3.44 3.70   70+ 1.82 1.99 2.17 2.36 2.55 2.75 2.95 3.16 70+ 1.97 2.19 2.41 2.64 2.87 3.12 3.37 3.62             Predicted Peak Expiratory Flow Rate                                       Height (in)  Female       Height (in) Male           Age 64 63 56 61 58 73 78 74 Age            21 344 357 372 387 402 417 432 446  60 62 64 66 68 70 72 74 76   25 337 352 366 381 396 411 426 441 25 447 476 505 533 562 591 [phone]   30 329 344 359 374 389 404 419 434 30 437 466 494 523 [phone] 667   35 322 337 351 366 381 396 411 426 35 426 455 484 512 541 [phone]   40 314 329 344 359 374 389 404 419 40 416 445 473 502 531 559 588 617 647   45 307 322 336 351 366 381 396 411 45 405 434 463 491 520 549 577 606 636   50 299 314 329 344 359 374 389 404 50 395 424 452 481 510 538 567 596 625   55 292 307 321 336 351 366 381 396 55 384 413 442 470 499 528 556 585 615   60 284 299 314 329 344 359 374 389 60 374 403 431 460 489 517 546 575 605   65 277 292 306 321 336 351 366 381 65 363 392 421 449 478 507 535 564 594   70 269 284 299 314 329 344 359 374 70 353 382 410 439 468 496 525 554 583   75 261 274 289 305 319 334 348 364 75 344 372 400 429 458 487 515 544 573   80 253 266 282 296 312 327 342 356 80 335 362 390 419 448 476 [phone]

## 2020-11-08 NOTE — DISCHARGE SUMMARY
[address]     Department of Internal Medicine - Staff Internal Medicine Teaching Service    INPATIENT DISCHARGE SUMMARY      Patient Identification:  Betsy Louise is a 78 y.o. female. :  1950  MRN: [id number]     Acct: [de-identified]   PCP: Wily Mc MD  Admit Date:  10/30/2020  Discharge date and time: 11/3/2020  7:55 PM   Attending Provider: Dr. Beronica Kamara Problem Lists:  Principal Problem:    Acute respiratory distress  Active Problems:    Type 2 diabetes mellitus with diabetic polyneuropathy, with long-term current use of insulin (HCC)    HTN (hypertension), benign    Morbid (severe) obesity with alveolar hypoventilation (HCC)    Obstructive sleep apnea syndrome    Acute on chronic respiratory failure with hypoxia and hypercapnia (HCC)    CKD (chronic kidney disease) stage 3, GFR 30-59 ml/min    Hyperglycemia    Uncontrolled type 2 diabetes mellitus with hyperglycemia ([facility] [address]  Resolved Problems:    * No resolved hospital problems.  *      HOSPITAL STAY     Brief Inpatient course:   Betsy Louise is a 78 y.o. female who was admitted for the management of Acute respiratory distress     The patient is a pleasant 69 y.o. female with PMH of uncontrolled DM type II, HTN, CKD stage III, COPD, HIGINIO, obesity presents with a chief complaint of blurred vision, shortness of breath and hyperglycemia for past 2 days.  Associated with urinary frequency.  She reported that her glucose reason for any defined range for past 2 days.  Her Lantus was recently increased from 42 to 50 units daily.  Also takes Metformin and short-acting insulin on sliding scale.     Has history of chronic back pain from lumbar spondylosis and sacroiliitis, follows pain management.  On Percocets for pain.  Was started on methylprednisone 4 mg daily for pain.     Initial ER labs:  Blood glucose 420, bicarb 26, anion gap 13, beta hydroxybutyrate 0.11, creatinine 1. 19. VBG: pH 7.315, PCO2 59.4, bicarb 30.2. Chest x-ray shows no acute abnormalities.     Rapid response:  Patient was admitted under OBS unit.  This morning it was a rapid response for patient being desaturating with O2 sats in low 50s.  Per RN, patient received 2 tablets of Percocet 2 hours prior to this episode. On examination, patient is very drowsy, disoriented.  Per RN, patient was not placed on any CPAP/BiPAP overnight, also her O2 nasal cannula was off. POC glucose was in 300s. Received 1 dose of Narcan which woke her up and saturations improved to 90s.  Patient placed on BiPAP and admitted to internal medicine, transfer to stepdown.     On floor:  Patient maintaining saturations on BiPAP.  Mentation improved.  Alert, awake and oriented x3.  Appears very dehydrated. significant therapeutic interventions done at the hospital:     1. Acute on chronic hypoxic respiratory failure likely multifactorial from HIGINIO, respiratory depression from opioid usage:  · Improving. On 2L NC.   · Decrease Percocet to 1 tablet every 6 as needed(Prefer non-opiates)  · Continue intermittent BiPAP alternating with nasal cannula O2.  2. HIGINIO/OHS overlap syndrome:  ? Patient possibly noncompliant with CPAP at home  ? Continue intermittent BiPAP monitoring with nasal cannula O2.  ? Follow-up pulmonology outpatient  2. Uncontrolled diabetes mellitus type 2 with hyperglycemia:  · Not in DKA, normal AG, bicarb normal, beta hydroxybutyrate normal.  · HbA1c 9/14/2020 was 10.6. · Hold Metformin for now. · Start Lantus 30 units daily and continue high-dose ICS  4. Hypertension:  · Stable. · Resume Coreg and Cozaar. · Hold Lasix at this time  5. YAJAIRA on CKD stage III likely secondary to dehydration:  · IV fluids at 125 mL/h  6. COPD: Not in exacerbation.  RT aerosol protocol. 7. Chronic lower back pain from lumbar spondylosis and sacroiliitis: Pain management preferably with non opiates. Procedures/ Significant Interventions:    None    Consults:     Consults:     Final Specialist Recommendations/Findings:   IP CONSULT TO CASE MANAGEMENT  IP CONSULT TO HOME CARE NEEDS      Any Hospital Acquired Infections: none    Discharge Functional Status:  stable    DISCHARGE PLAN     Disposition: home with [facility]    Patient Instructions:   Discharge Medication List as of 11/3/2020  3:43 PM      START taking these medications    Details   albuterol (PROVENTIL) (2.5 MG/3ML) 0.083% nebulizer solution Take 3 mLs by nebulization every 6 hours as needed for Wheezing, Disp-120 each,R-3Normal         CONTINUE these medications which have NOT CHANGED    Details   furosemide (LASIX) 20 MG tablet TAKE 1 TAB BY MOUTH ONCE A DAY AS NEEDED ( WEIGHT GAIN 3 LBS OVERNIGHT ) , Disp-30 tablet,R-3Normal      TRUE METRIX BLOOD GLUCOSE TEST strip THREE TIMES A DAY , Disp-100 each,R-3Normal      topiramate (TOPAMAX) 100 MG tablet TAKE 1 TABLET BY MOUTH NIGHTLY , Disp-90 tablet,R-1Normal      clopidogrel (PLAVIX) 75 MG tablet TAKE 1 TAB BY MOUTH ONCE A DAY ( IN THE MORNING ) -THIS MEDICINE MAY BE TAKENWITH OR WITHOUT FOOD , Disp-90 tablet,R-1Normal      LANTUS SOLOSTAR 100 UNIT/ML injection pen INJECT 42 UNITS INTO THE SKIN 2 TIMES DAILY , Disp-15 mL,R-3Normal      zoster recombinant adjuvanted vaccine (SHINGRIX) 50 MCG/0.5ML SUSR injection 50 MCG IM then repeat 2-6 months., Disp-0.5 mL,R-1Normal      Icosapent Ethyl (VASCEPA) 1 g CAPS capsule Take 1 capsule by mouth 2 times daily, Disp-60 capsule,R-3Normal      vitamin B-12 (CYANOCOBALAMIN) 100 MCG tablet Take 50 mcg by mouth dailyHistorical Med      isosorbide dinitrate (ISORDIL) 30 MG tablet Take 30 mg by mouthHistorical Med      ULTICARE MINI PEN NEEDLES 31G X 6 MM MISC Disp-100 each,R-5, Normal      metFORMIN (GLUCOPHAGE) 1000 MG tablet TAKE 1 TAB BY MOUTH TWICE A DAY ( IN THE MORNING AND BEDTIME ) , Disp-180 tablet,R-3Normal      ofloxacin (OCUFLOX) 0.3 % solution Historical Med      gabapentin (NEURONTIN) 300 MG capsule Take 1 capsule by mouth 3 times daily for 30 days. , Disp-90 capsule,R-2Normal      nystatin (MYCOSTATIN) 556596 UNIT/GM powder Apply 3 times daily. , Disp-3 Bottle, R-3, Normal      dicyclomine (BENTYL) 10 MG capsule Take 1 capsule by mouth 2 times daily as needed (abdominal spasm), Disp-60 capsule, R-0Normal      tobramycin-dexamethasone (TOBRADEX) 0.3-0.1 % ophthalmic suspension Historical Med      insulin aspart (NOVOLOG FLEXPEN) 100 UNIT/ML injection pen If <139 - No Insulin; 140-199-2 Units;200-249-4 Units;250-299-6 Units;300-349-8 Units;350-400=10 Units; Above 400-12 Units, Disp-5 pen, R-3Normal      aspirin 81 MG chewable tablet Take 1 tablet by mouth daily, Disp-90 tablet, R-3Normal      lidocaine (LMX) 4 % cream Apply topically every 8 hrs as needed for pain, Disp-45 g, R-3, Normal      !! Lift Chair MISC Starting Tue 9/24/2019, Disp-1 each, R-0, Print      Misc. Devices (ADJUST BATH/SHOWER SEAT/BACK) MISC Disp-1 each, R-0, PrintUse daily for shower      ferrous sulfate 325 (65 Fe) MG tablet TAKE 1 TAB BY MOUTH EVERY MORNING, Disp-90 tablet, R-5Normal      magnesium oxide (MAG-OX) 400 MG tablet TAKE 1 TAB BY MOUTH TWICE A DAY ( AM AND BEDTIME ), Disp-180 tablet, R-3Normal      carvedilol (COREG) 3.125 MG tablet TAKE 1 TAB BY MOUTH TWICE A DAY ( AM AND BEDTIME ), Disp-180 tablet, R-3Normal      citalopram (CELEXA) 40 MG tablet TAKE 1/2  TAB BY MOUTH TWICE  A DAY, Disp-90 tablet, R-3Normal      pantoprazole (PROTONIX) 40 MG tablet Take 1 tablet by mouth 2 times daily, Disp-60 tablet, R-3Print      Alcohol Swabs (B-D SINGLE USE SWABS REGULAR) PADS Disp-100 each, R-0, Normal      losartan (COZAAR) 25 MG tablet TAKE 1 TAB BY MOUTH ONCE A DAY, Disp-90 tablet, R-5Normal      !!  Lift Chair [facility] Starting Tue 11/6/2018, Disp-1 each, R-0, PrintUse daily at home to help with ADL's      nitroGLYCERIN (NITROSTAT) 0.4 MG SL tablet 1 under the tongue as needed for angina, may

## 2020-11-12 ENCOUNTER — TELEPHONE (OUTPATIENT)
Dept: FAMILY MEDICINE CLINIC | Age: 70
End: 2020-11-12

## 2020-11-12 NOTE — TELEPHONE ENCOUNTER
Pt can check her blood sugars and send us her readings, we will adjust her medications according to that .

## 2020-11-12 NOTE — TELEPHONE ENCOUNTER
Patient called upset because she had a recent hospital stay and has not heard from anyone to check on her. I scheduled the patient to come in on 11/25/20, she is having a lot of issues with her sugar, she has a home health nurse coming everyday who is trying to help her get sugars down.

## 2020-11-17 ENCOUNTER — HOSPITAL ENCOUNTER (OUTPATIENT)
Dept: WOMENS IMAGING | Age: 70
Discharge: HOME | End: 2020-11-19
Payer: COMMERCIAL

## 2020-11-17 PROCEDURE — 77063 BREAST TOMOSYNTHESIS BI: CPT

## 2020-11-25 ENCOUNTER — OFFICE VISIT (OUTPATIENT)
Dept: FAMILY MEDICINE CLINIC | Age: 70
End: 2020-11-25
Payer: COMMERCIAL

## 2020-11-25 VITALS
DIASTOLIC BLOOD PRESSURE: 78 MMHG | TEMPERATURE: 97.2 F | HEART RATE: 82 BPM | BODY MASS INDEX: 46.93 KG/M2 | WEIGHT: 255 LBS | OXYGEN SATURATION: 93 % | HEIGHT: 62 IN | SYSTOLIC BLOOD PRESSURE: 128 MMHG

## 2020-11-25 PROCEDURE — 2022F DILAT RTA XM EVC RTNOPTHY: CPT | Performed by: FAMILY MEDICINE

## 2020-11-25 PROCEDURE — G8399 PT W/DXA RESULTS DOCUMENT: HCPCS | Performed by: FAMILY MEDICINE

## 2020-11-25 PROCEDURE — G8484 FLU IMMUNIZE NO ADMIN: HCPCS | Performed by: FAMILY MEDICINE

## 2020-11-25 PROCEDURE — 1090F PRES/ABSN URINE INCON ASSESS: CPT | Performed by: FAMILY MEDICINE

## 2020-11-25 PROCEDURE — 99215 OFFICE O/P EST HI 40 MIN: CPT | Performed by: FAMILY MEDICINE

## 2020-11-25 PROCEDURE — 1036F TOBACCO NON-USER: CPT | Performed by: FAMILY MEDICINE

## 2020-11-25 PROCEDURE — 1123F ACP DISCUSS/DSCN MKR DOCD: CPT | Performed by: FAMILY MEDICINE

## 2020-11-25 PROCEDURE — 3046F HEMOGLOBIN A1C LEVEL >9.0%: CPT | Performed by: FAMILY MEDICINE

## 2020-11-25 PROCEDURE — 1111F DSCHRG MED/CURRENT MED MERGE: CPT | Performed by: FAMILY MEDICINE

## 2020-11-25 PROCEDURE — 4040F PNEUMOC VAC/ADMIN/RCVD: CPT | Performed by: FAMILY MEDICINE

## 2020-11-25 PROCEDURE — G8417 CALC BMI ABV UP PARAM F/U: HCPCS | Performed by: FAMILY MEDICINE

## 2020-11-25 PROCEDURE — G8427 DOCREV CUR MEDS BY ELIG CLIN: HCPCS | Performed by: FAMILY MEDICINE

## 2020-11-25 PROCEDURE — 3017F COLORECTAL CA SCREEN DOC REV: CPT | Performed by: FAMILY MEDICINE

## 2020-11-25 RX ORDER — DICYCLOMINE HYDROCHLORIDE 10 MG/1
10 CAPSULE ORAL 2 TIMES DAILY PRN
Qty: 180 CAPSULE | Refills: 0 | Status: SHIPPED | OUTPATIENT
Start: 2020-11-25 | End: 2021-11-24

## 2020-11-25 ASSESSMENT — ENCOUNTER SYMPTOMS
NAUSEA: 0
RHINORRHEA: 0
SINUS PAIN: 0
COUGH: 0
ABDOMINAL PAIN: 0
SHORTNESS OF BREATH: 0
SORE THROAT: 0
COLOR CHANGE: 0
VOMITING: 0
VOICE CHANGE: 0
BLOOD IN STOOL: 0
CONSTIPATION: 0
BACK PAIN: 1
WHEEZING: 0
ABDOMINAL DISTENTION: 0
PHOTOPHOBIA: 0
CHEST TIGHTNESS: 0

## 2020-11-25 NOTE — PROGRESS NOTES
Visit Information    Have you changed or started any medications since your last visit including any over-the-counter medicines, vitamins, or herbal medicines? no   Are you having any side effects from any of your medications? -  no  Have you stopped taking any of your medications? Is so, why? -  no    Have you seen any other physician or provider since your last visit? yes  Have you had any other diagnostic tests since your last visit? Yes - Records Obtained  Have you been seen in the emergency room and/or had an admission to a hospital since we last saw you? Yes - Records Obtained  Have you had your routine dental cleaning in the past 6 months? no    Have you activated your [email or web address] account? If not, what are your barriers?  No:      Patient Care Team:  Kandy Hale MD as PCP - General (Family Medicine)  Kandy Hale MD as PCP - [facility]  Rahul Stanley MD as Consulting Physician (Urology)  Stalin Sumner MD as Consulting Physician (Pulmonology)  Marcos Wright MD as Consulting Physician (Internal Medicine)  Torsten Contreras (Optometry)  VERONIKA Swann CNP as Nurse Practitioner (Certified Nurse Practitioner)    Medical History Review  Past Medical, Family, and Social History reviewed and does contribute to the patient presenting condition    Health Maintenance   Topic Date Due    DTaP/Tdap/Td vaccine (1 - Tdap) 12/24/1968    Shingles Vaccine (1 of 2) 12/24/1999    Annual Wellness Visit (AWV)  05/29/2019    Colon cancer screen colonoscopy  04/10/2020    A1C test (Diabetic or Prediabetic)  12/14/2020    Diabetic retinal exam  01/30/2021    Lipid screen  07/02/2021    Diabetic foot exam  08/24/2021    Potassium monitoring  11/01/2021    Creatinine monitoring  11/01/2021    Breast cancer screen  11/17/2022    DEXA (modify frequency per FRAX score)  Completed    Flu vaccine  Completed    Pneumococcal 65+ years Vaccine  Completed    Hepatitis C screen  Completed    Hepatitis A vaccine  Aged Out    Hib vaccine  Aged Out    Meningococcal (ACWY) vaccine  Aged Out

## 2020-11-25 NOTE — PROGRESS NOTES
Post-Discharge Transitional Care Management Services or Hospital Follow Up      Brody Larios   YOB: 1950    Date of Office Visit:  11/25/2020  Date of Hospital Admission: 10/30/20  Date of Hospital Discharge: 11/3/20  Readmission Risk Score(high >=14%.  Medium >=10%):Readmission Risk Score: 29      Care management risk score Rising risk (score 2-5) and Complex Care (Scores >=6): 9     Non face to face  following discharge, date last encounter closed (first attempt may have been earlier): *No documented post hospital discharge outreach found in the last 14 days *No documented post hospital discharge outreach found in the last 14 days    Call initiated 2 business days of discharge: *No response recorded in the last 14 days     Patient Active Problem List   Diagnosis    Chronic pain of left knee    Type 2 diabetes mellitus with diabetic polyneuropathy, with long-term current use of insulin ([facility] [address]    Nonintractable migraine, unspecified migraine type    Coronary artery disease due to lipid rich plaque    DDD (degenerative disc disease), lumbar    Chronic, continuous use of opioids    DDD (degenerative disc disease), cervical    Osteoarthritis of cervical spine    Medication monitoring encounter    GERD (gastroesophageal reflux disease)    HTN (hypertension), benign    Mixed hyperlipidemia    Insomnia    Lumbosacral spondylosis without myelopathy    Sacroiliitis, not elsewhere classified ([address]    Carpal tunnel syndrome    Mixed stress and urge urinary incontinence    Bilateral low back pain with sciatica    Intractable migraine with aura without status migrainosus    Spondylarthrosis    Anxiety and depression    Chronic migraine without aura without status migrainosus, not intractable    Pulmonary embolism (HCC)    Morbid (severe) obesity with alveolar hypoventilation (HCC)    Lung nodule    Neuropathy    Obstructive sleep apnea syndrome    Acute on chronic respiratory failure with hypoxia and hypercapnia (HCC)    Asthma with COPD ([facility] [address]    Gastroesophageal reflux disease with esophagitis    Morbid obesity with BMI of 40.0-44.9, adult (HCC)    Congestive heart failure (HCC)    Stage 3 chronic kidney disease    Benign hypertension with CKD (chronic kidney disease) stage III    Secondary diabetes mellitus with stage 3 chronic kidney disease (GFR 30-59) (HCC)    CKD (chronic kidney disease) stage 3, GFR 30-59 ml/min    Episode of altered cognition    Lumbar radiculopathy, chronic    Abdominal spasms    Sessile colonic polyp    Chest pain at rest    Hyperglycemia    Acute respiratory distress    Uncontrolled type 2 diabetes mellitus with hyperglycemia ([facility]       Allergies   Allergen Reactions    Robitussin [Guaifenesin] Anaphylaxis    Codeine Swelling    Compazine [Prochlorperazine Maleate] Hives and Swelling    Iodides Itching    Morphine Other (See Comments)     hallucinations    Moxifloxacin      Other reaction(s): Intolerance-unknown  Other reaction(s):  Intolerance-unknown    Reglan [Metoclopramide] Nausea Only    Avelox [Moxifloxacin Hcl In Nacl] Rash     Dermatitis      Cipro Xr Rash     dermatitis    Sulfa Antibiotics Rash       Medications listed as ordered at the time of discharge from [facility] Medication Instructions GABO:    Printed on:11/25/20 1240   Medication Information                      albuterol (PROVENTIL) (2.5 MG/3ML) 0.083% nebulizer solution  Take 3 mLs by nebulization every 6 hours as needed for Wheezing             Alcohol Swabs (B-D SINGLE USE SWABS REGULAR) PADS  THREE TIMES A DAY             aspirin 81 MG chewable tablet  Take 1 tablet by mouth daily             Blood Glucose Monitoring Suppl HARMEET  Use daily to check BS             carvedilol (COREG) 3.125 MG tablet  TAKE 1 TAB BY MOUTH TWICE A DAY ( AM AND BEDTIME )             citalopram (CELEXA) 40 MG tablet  TAKE 1/2  TAB BY MOUTH TWICE  A DAY BEDTIME )              Misc. Devices (ADJUST BATH/SHOWER SEAT/BACK) MISC  Use daily for shower             nitroGLYCERIN (NITROSTAT) 0.4 MG SL tablet  1 under the tongue as needed for angina, may repeat q5mins for up three doses             nystatin (MYCOSTATIN) 949717 UNIT/GM powder  Apply 3 times daily. ofloxacin (OCUFLOX) 0.3 % solution               OXYGEN  Inhale 3 L into the lungs              pantoprazole (PROTONIX) 40 MG tablet  Take 1 tablet by mouth 2 times daily             SYMBICORT 160-4.5 MCG/ACT AERO    2 puffs As needed for sob             tobramycin-dexamethasone (TOBRADEX) 0.3-0.1 % ophthalmic suspension               topiramate (TOPAMAX) 100 MG tablet  TAKE 1 TABLET BY MOUTH NIGHTLY              TRUE METRIX BLOOD GLUCOSE TEST strip  THREE TIMES A DAY              ULTICARE MINI PEN NEEDLES 31G X 6 MM MISC  USE AS DIRECTED              vitamin B-12 (CYANOCOBALAMIN) 100 MCG tablet  Take 50 mcg by mouth daily             zoster recombinant adjuvanted vaccine (SHINGRIX) 50 MCG/0.5ML SUSR injection  50 MCG IM then repeat 2-6 months.                    Medications marked \"taking\" at this time  Outpatient Medications Marked as Taking for the 11/25/20 encounter (Office Visit) with Chris Pascal MD   Medication Sig Dispense Refill    dicyclomine (BENTYL) 10 MG capsule Take 1 capsule by mouth 2 times daily as needed (abdominal spasm) 180 capsule 0    albuterol (PROVENTIL) (2.5 MG/3ML) 0.083% nebulizer solution Take 3 mLs by nebulization every 6 hours as needed for Wheezing 120 each 3    furosemide (LASIX) 20 MG tablet TAKE 1 TAB BY MOUTH ONCE A DAY AS NEEDED ( WEIGHT GAIN 3 LBS OVERNIGHT )  30 tablet 3    TRUE METRIX BLOOD GLUCOSE TEST strip THREE TIMES A DAY  100 each 3    topiramate (TOPAMAX) 100 MG tablet TAKE 1 TABLET BY MOUTH NIGHTLY  90 tablet 1    clopidogrel (PLAVIX) 75 MG tablet TAKE 1 TAB BY MOUTH ONCE A DAY ( IN THE MORNING ) -THIS MEDICINE MAY BE TAKENWITH OR WITHOUT FOOD  90 each 0    nitroGLYCERIN (NITROSTAT) 0.4 MG SL tablet 1 under the tongue as needed for angina, may repeat q5mins for up three doses      Blood Glucose Monitoring Suppl HARMEET Use daily to check BS 1 Device 0    ipratropium-albuterol (DUONEB) 0.5-2.5 (3) MG/3ML SOLN nebulizer solution Inhale 3 mLs into the lungs every 4 hours 360 mL 2    Melatonin 10 MG TABS Take 10 mg by mouth nightly 90 tablet 3    Compression Stockings MISC by Does not apply route Pressure between 20 - 25 . 1 each 0    docusate sodium (COLACE) 100 MG capsule Take 1 capsule by mouth 2 times daily Please use while taking Percocet 30 capsule 0    SYMBICORT 160-4.5 MCG/ACT AERO   2 puffs As needed for sob      OXYGEN Inhale 3 L into the lungs           Medications patient taking as of now reconciled against medications ordered at time of hospital discharge: Yes    Chief Complaint   Patient presents with    Follow-Up from Hospital    Diabetes       HPI: Patient is here for follow-up on hospital visit. Patient was in the ER about 3 weeks before with symptoms of high sugars, reports sugars were more than 400. Patient was started on steroids for chronic low back pain due to flareup and that increased her sugars. Patient reported she did not go to sugars down and went to ER. Her A1c has increased to 10.6 from 8. Patient was treated with IV insulin, her anion gap was 13 and had mild beta hydroxybutyrate. Patient had mild YAJAIRA on admission. Patient was given IV fluids insulin was increased to 50 units twice daily and is also on NovoLog and Metformin. Patient's blood sugar log is showing blood sugars in the range of 1 50-1 80 most readings are below 150. She reports compliance with medications.       Hypertension controlled denies any chest pain has chronic chest pain on and off follows with cardiologist.      Patient has chronic lumbar radiculopathy is on Percocets for pain management was recently seen reports he recommended epidural injection but patient refused. Patient has not done colonoscopy is due contact number provided. Inpatient course: Discharge summary reviewed- see chart. Interval history/Current status: stable at her baseline, ,blood sugars are below 150. Sleep apnea patient was not using her CPAP at home reports she got tired of it and had apneic spell. Discharge summary  The patient is a pleasant 69 y.o. female with PMH of uncontrolled DM type II, HTN, CKD stage III, COPD, HIGINIO, obesity presents with a chief complaint of blurred vision, shortness of breath and hyperglycemia for past 2 days.  Associated with urinary frequency.  She reported that her glucose reason for any defined range for past 2 days.  Her Lantus was recently increased from 42 to 50 units daily.  Also takes Metformin and short-acting insulin on sliding scale. Review of Systems   Constitutional: Positive for activity change. Negative for appetite change, diaphoresis, fatigue and unexpected weight change. HENT: Negative for congestion, ear pain, hearing loss, mouth sores, postnasal drip, rhinorrhea, sinus pain, sore throat and voice change. Eyes: Positive for visual disturbance. Negative for photophobia. Respiratory: Negative for cough, chest tightness, shortness of breath and wheezing. Cardiovascular: Positive for leg swelling. Negative for chest pain and palpitations. Gastrointestinal: Negative for abdominal distention, abdominal pain, blood in stool, constipation, nausea and vomiting. Endocrine: Negative for polyuria. Genitourinary: Negative for difficulty urinating, dysuria, frequency, hematuria and urgency. Musculoskeletal: Positive for arthralgias, back pain, gait problem, joint swelling, myalgias and neck pain. Skin: Negative for color change. Neurological: Positive for weakness and numbness. Negative for dizziness, speech difficulty and headaches.    Psychiatric/Behavioral: Negative for agitation, decreased concentration, dysphoric mood, sleep disturbance and suicidal ideas. The patient is not nervous/anxious and is not hyperactive. Vitals:    11/25/20 1044   BP: 128/78   Pulse: 82   Temp: 97.2 °F (36.2 °C)   TempSrc: Temporal   SpO2: 93%   Weight: 255 lb (115.7 kg)   Height: 5' 2\" (1.575 m)     Body mass index is 46.64 kg/m². Wt Readings from Last 3 Encounters:   11/25/20 255 lb (115.7 kg)   11/03/20 268 lb 15.4 oz (122 kg)   09/14/20 250 lb 9.6 oz (113.7 kg)     BP Readings from Last 3 Encounters:   11/25/20 128/78   11/03/20 123/63   09/14/20 130/86       Physical Exam  Vitals signs and nursing note reviewed. Constitutional:       Appearance: Normal appearance. She is obese. HENT:      Head: Normocephalic and atraumatic. Nose: Nose normal.      Mouth/Throat:      Mouth: Mucous membranes are moist.   Eyes:      Pupils: Pupils are equal, round, and reactive to light. Neck:      Musculoskeletal: Normal range of motion. Cardiovascular:      Rate and Rhythm: Normal rate and regular rhythm. Heart sounds: Normal heart sounds. Pulmonary:      Effort: Pulmonary effort is normal. No tachypnea. Breath sounds: Normal breath sounds. Decreased air movement present. No wheezing, rhonchi or rales. Abdominal:      General: Bowel sounds are normal. There is no distension. Palpations: Abdomen is soft. There is no mass. Tenderness: There is no abdominal tenderness. There is no rebound. Musculoskeletal:      Lumbar back: She exhibits decreased range of motion, tenderness, pain and spasm. Comments: Patient always comes on scooter   Skin:     General: Skin is warm. Neurological:      Mental Status: She is alert and oriented to person, place, and time. Cranial Nerves: Cranial nerves are intact. Sensory: Sensory deficit present. Motor: Weakness present.    Psychiatric:         Attention and Perception: Attention and perception normal.         Speech: Speech normal. Cognition and Memory: Cognition and memory normal.         Judgment: Judgment normal.             Assessment/Plan:  1. Type 2 diabetes mellitus with diabetic polyneuropathy, with long-term current use of insulin (HCC)  Sugars have improved discussed with patient continue same dose of insulin continue metformin and use NovoLog as needed basis. Keep checking your sugars daily  - CO DISCHARGE MEDS RECONCILED W/ CURRENT OUTPATIENT MED LIST    2. Mixed hyperlipidemia  Stable continue same medications  - CO DISCHARGE MEDS RECONCILED W/ CURRENT OUTPATIENT MED LIST    3. HTN (hypertension), benign  Blood pressures running normal  - CO DISCHARGE MEDS RECONCILED W/ CURRENT OUTPATIENT MED LIST    4. DDD (degenerative disc disease), lumbar  Discussed patient no need of steroids in the future continue Percocet and follow-up with pain management  - CO DISCHARGE MEDS RECONCILED W/ CURRENT OUTPATIENT MED LIST    5. Abdominal spasms    - dicyclomine (BENTYL) 10 MG capsule; Take 1 capsule by mouth 2 times daily as needed (abdominal spasm)  Dispense: 180 capsule; Refill: 0  - CO DISCHARGE MEDS RECONCILED W/ CURRENT OUTPATIENT MED LIST    6. Lumbar radiculopathy, chronic    - CO DISCHARGE MEDS RECONCILED W/ CURRENT OUTPATIENT MED LIST    7.  Obstructive sleep apnea syndrome  Patient reported noncompliance encouraged to use CPAP daily        Medical Decision Making: high complexity

## 2020-11-29 ASSESSMENT — ENCOUNTER SYMPTOMS
EYE REDNESS: 0
ABDOMINAL PAIN: 0
SORE THROAT: 0
SHORTNESS OF BREATH: 1
COUGH: 0
PHOTOPHOBIA: 0
CONSTIPATION: 0
GASTROINTESTINAL NEGATIVE: 1
BACK PAIN: 1

## 2020-11-29 NOTE — PROGRESS NOTES
Adela Lezama is a 78 y.o. female evaluated on 11/30/2020. Modality of virtual service provided -via telephone   Consent:  Patient and/or health care decision maker is aware that that patient may receive a bill for this telephone service, depending on one's insurance coverage, and has provided verbal consent to proceed: Yes    Patient identification was verified at the start of the visit: Yes    Chief complaint: Adela Lezama is 78 y.o.,  female, with chief complaint of back pain. Patient is complaining of pain involving the low back as well as in the neck. Patient reports there not much change in her pain level since her last visit. Patient has diabetes which is not under control. She reports she is trying to exercise. Previous steroid injections because of her blood sugars 2 days to very high levels and hence no further interventions were attempted. Back Pain   This is a chronic problem. The current episode started more than 1 year ago. The problem occurs constantly. The problem is unchanged (Slightly worse than the past). The pain is present in the lumbar spine and sacro-iliac. The quality of the pain is described as aching (sharp). The pain radiates to the left thigh, right foot and right thigh. The pain is at a severity of 7/10 (5-9). The pain is severe. The pain is worse during the day. The symptoms are aggravated by bending, standing and twisting (ADLs, Walking, Lifting). Associated symptoms include chest pain and weakness. Pertinent negatives include no abdominal pain, dysuria, numbness or tingling. (Rt leg) Risk factors include obesity, lack of exercise and sedentary lifestyle. Alleviating factors:heat and rest   Lifestyle changes experienced with pain: Prevents or limits ADLs, Increases w/activity.  , Increases w/prolonged sitting/standing/walking  Mood changes,none  Patient currently unemployed.   Physical therapy doing physical therapy at present    Are you under psychological counseling at present: No  Goals for treatment include:  Decrease in pain  Enjoy daily and recreational activities, return to previous status. Patient relates current medications are helping the pain. Patient reports taking pain medications as prescribed, denies obtaining medications from different sources and denies use of illegal drugs. Patient denies side effects from medications like nausea, vomiting, constipation or drowsiness. Patient reports current activities of daily living ar possible due to medications and would like to continue them. ACTIVITY/SOCIAL/EMOTIONAL:  Sleep Pattern: 7 hours per night. generally restful sleep  Home Exercises: daily Doing physical therapy twice a day  Activity:unchanged  Emotional Issues: normal.   Currently seeing a Psychiatrist or Psychologist:  No     ADVERSE MEDICATION EFFECTS:   Nausea and vomiting: no   Constipation: no-Undercontrol-: yes  Dizziness/drowsy/sleepy--no  Urinary Retention: no    ABERRANT BEHAVIORS SINCE LAST VISIT  Lost rx/pills:------------------------------------------ no  Taking  medication as prescribed: ----------- yes  Urine Drug Screen ---------------------------------  yes  Recent ER visits: -------------------------------------No  Pill count is appropriate: ---------------------------yes   Refills for prescriptions appropriate:---------- yes      Past Medical History:   Diagnosis Date    Allergic rhinitis     Anxiety 7/17/2013    Arthritis     Asthma     Atrial fibrillation ([facility])     Back pain     NERVE/DR. GRACIA    Benign hypertension with CKD (chronic kidney disease) stage III     CAD (coronary artery disease) 3/21/2013    Caffeine use     2 coffee/day    CHF (congestive heart failure) ([facility])     Chronic kidney disease     CKD (chronic kidney disease) stage 3, GFR 30-59 ml/min     COPD (chronic obstructive pulmonary disease) ([facility])     emphysema    Degeneration of lumbar or lumbosacral intervertebral disc  Depression     DM [facility]diabetes mellitus) [facility] [address]     Emphysema of lung (HCC)     Gastritis     GERD (gastroesophageal reflux disease)     Hearing loss     Hematuria     Hiatal hernia     HTN (hypertension), benign 6/28/2014    Hypertriglyceridemia     Incontinence of urine 9/25/2013    Knee arthropathy     Lumbosacral spondylosis without myelopathy 9/29/2014    Migraine headache     DR. 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 sigmoid diverticulosis, min. int. hemorrhoids    CYSTOSCOPY  9/25/2013    DILATION AND CURETTAGE  1979    EYE SURGERY      laser    INCONTINENCE SURGERY      Percutaneous nerve stimulation Dr D[facility] Nov 2013     INSERTABLE CARDIAC MONITOR  12/04/2015    [employer] REVEAL LINQ MODEL #[id number] MRI CONDTIONAL OK 3T.  IMMEDIATELY POST IMPLANT    OTHER SURGICAL HISTORY  09/10/15    removal of interstim    [facility] COLSC FLX W/REMOVAL LESION BY HOT BX FORCEPS N/A 4/10/2017    COLONOSCOPY POLYPECTOMY HOT BIOPSY performed by Nany Scott MD at [address] [facility] [address]      TYMPANOPLASTY      TYMPANOSTOMY TUBE PLACEMENT  08/21/2017    UPPER GASTROINTESTINAL ENDOSCOPY  03/2016    Dr Marbella Henry       Family History   Problem Relation Age of Onset    Diabetes Mother     Heart Disease Mother     Stomach Cancer Father     Diabetes Maternal Grandmother         also aunts and uncles (maternal)    Emphysema Sister        Social History     Socioeconomic History    Marital status: Legally      Spouse name: None    Number of children: None    Years of education: None    Highest education level: None   Occupational History    Occupation: disabled     Employer: N/A   Social Needs    Financial resource strain: None    Food insecurity     Worry: None     Inability: None    Transportation needs     Medical: None     Non-medical: None   Tobacco Use    Smoking status: Former Smoker     Packs/day: 3.00     Years: 41.00     Pack years: 123.00     Types: Cigarettes     Last attempt to quit: 2000     Years since quittin.9    Smokeless tobacco: Never Used    Tobacco comment: quit in    Substance and Sexual Activity    Alcohol use: No     Alcohol/week: 0.0 standard drinks     Frequency: Never    Drug use: No    Sexual activity: Not Currently   Lifestyle    Physical activity     Days per week: None     Minutes per session: None    Stress: None   Relationships    Social connections     Talks on phone: None     Gets together: None     Attends Yarsanism service: None     Active member of club or organization: None     Attends meetings of clubs or organizations: None     Relationship status: None    Intimate partner violence     Fear of current or ex partner: None     Emotionally abused: None     Physically abused: None     Forced sexual activity: None   Other Topics Concern    None   Social History Narrative    None       Allergies   Allergen Reactions    Robitussin [Guaifenesin] Anaphylaxis    Codeine Swelling    Compazine [Prochlorperazine Maleate] Hives and Swelling    Iodides Itching    Morphine Other (See Comments)     hallucinations    Moxifloxacin      Other reaction(s):  Intolerance-unknown  Other reaction(s): Intolerance-unknown    Reglan [Metoclopramide] Nausea Only    Avelox [Moxifloxacin Hcl In Nacl] Rash     Dermatitis      Cipro Xr Rash     dermatitis    Sulfa Antibiotics Rash       Current Outpatient Medications on File Prior to Encounter   Medication Sig Dispense Refill    dicyclomine (BENTYL) 10 MG capsule Take 1 capsule by mouth 2 times daily as needed (abdominal spasm) 180 capsule 0    albuterol (PROVENTIL) (2.5 MG/3ML) 0.083% nebulizer solution Take 3 mLs by nebulization every 6 hours as needed for Wheezing 120 each 3    furosemide (LASIX) 20 MG tablet TAKE 1 TAB BY MOUTH ONCE A DAY AS NEEDED ( WEIGHT GAIN 3 LBS OVERNIGHT )  30 tablet 3    TRUE METRIX BLOOD GLUCOSE TEST strip THREE TIMES A DAY  100 each 3    clopidogrel (PLAVIX) 75 MG tablet TAKE 1 TAB BY MOUTH ONCE A DAY ( IN THE MORNING ) -THIS MEDICINE MAY BE TAKENWITH OR WITHOUT FOOD  90 tablet 1    LANTUS SOLOSTAR 100 UNIT/ML injection pen INJECT 42 UNITS INTO THE SKIN 2 TIMES DAILY  15 mL 3    zoster recombinant adjuvanted vaccine (SHINGRIX) 50 MCG/0.5ML SUSR injection 50 MCG IM then repeat 2-6 months. (Patient not taking: Reported on 11/25/2020) 0.5 mL 1    Icosapent Ethyl (VASCEPA) 1 g CAPS capsule Take 1 capsule by mouth 2 times daily 60 capsule 3    vitamin B-12 (CYANOCOBALAMIN) 100 MCG tablet Take 50 mcg by mouth daily      isosorbide dinitrate (ISORDIL) 30 MG tablet Take 30 mg by mouth      ULTICARE MINI PEN NEEDLES 31G X 6 MM MISC USE AS DIRECTED  100 each 5    metFORMIN (GLUCOPHAGE) 1000 MG tablet TAKE 1 TAB BY MOUTH TWICE A DAY ( IN THE MORNING AND BEDTIME )  180 tablet 3    ofloxacin (OCUFLOX) 0.3 % solution       gabapentin (NEURONTIN) 300 MG capsule Take 1 capsule by mouth 3 times daily for 30 days. 90 capsule 2    nystatin (MYCOSTATIN) 955687 UNIT/GM powder Apply 3 times daily.  3 Bottle 3    tobramycin-dexamethasone (TOBRADEX) 0.3-0.1 % ophthalmic suspension       insulin aspart (NOVOLOG FLEXPEN) 100 UNIT/ML injection pen If <139 - No Insulin; 140-199-2 Units;200-249-4 Units;250-299-6 Units;300-349-8 Units;350-400=10 Units; Above 400-12 Units 5 pen 3    aspirin 81 MG chewable tablet Take 1 tablet by mouth daily 90 tablet 3    lidocaine (LMX) 4 % cream Apply topically every 8 hrs as needed for pain 45 g 3    Lift Chair MISC by Does not apply route 1 each 0    Misc.  Devices (ADJUST BATH/SHOWER SEAT/BACK) MISC Use daily for shower 1 each 0    ferrous sulfate 325 (65 Fe) MG tablet TAKE 1 TAB BY MOUTH EVERY MORNING 90 tablet 5    magnesium oxide (MAG-OX) 400 MG tablet TAKE 1 TAB BY MOUTH TWICE A DAY ( AM AND BEDTIME ) 180 tablet 3    carvedilol (COREG) 3.125 MG tablet TAKE 1 TAB BY MOUTH TWICE A DAY ( AM AND BEDTIME ) 180 tablet 3    citalopram (CELEXA) 40 MG tablet TAKE 1/2  TAB BY MOUTH TWICE  A DAY 90 tablet 3    pantoprazole (PROTONIX) 40 MG tablet Take 1 tablet by mouth 2 times daily 60 tablet 3    Alcohol Swabs (B-D SINGLE USE SWABS REGULAR) PADS THREE TIMES A  each 0    losartan (COZAAR) 25 MG tablet TAKE 1 TAB BY MOUTH ONCE A DAY 90 tablet 5    Lift Chair MISC by Does not apply route Use daily at home to help with ADL's 1 each 0    nitroGLYCERIN (NITROSTAT) 0.4 MG SL tablet 1 under the tongue as needed for angina, may repeat q5mins for up three doses      Blood Glucose Monitoring Suppl HARMEET Use daily to check BS 1 Device 0    ipratropium-albuterol (DUONEB) 0.5-2.5 (3) MG/3ML SOLN nebulizer solution Inhale 3 mLs into the lungs every 4 hours 360 mL 2    Melatonin 10 MG TABS Take 10 mg by mouth nightly 90 tablet 3    Compression Stockings MISC by Does not apply route Pressure between 20 - 25 . 1 each 0    docusate sodium (COLACE) 100 MG capsule Take 1 capsule by mouth 2 times daily Please use while taking Percocet 30 capsule 0    SYMBICORT 160-4.5 MCG/ACT AERO   2 puffs As needed for sob      OXYGEN Inhale 3 L into the lungs        No current facility-administered medications on file prior to 10/23/2019 5:48 pm        TECHNIQUE:    Multiplanar multisequence MRI of the lumbar spine was performed without and    with the administration of intravenous contrast.        COMPARISON:    MRI lumbar spine 01/08/2018        HISTORY:    ORDERING SYSTEM PROVIDED HISTORY:    TECHNOLOGIST PROVIDED HISTORY:    back pain, weakness, epidural injection 5 weeks ago        FINDINGS:    BONES/ALIGNMENT: There is normal alignment of the spine. The vertebral body    heights are maintained. The bone marrow signal appears unremarkable. Multilevel Schmorl's nodes identified involving the lower thoracic and upper    lumbar spine. SPINAL CORD:  The conus terminates normally. SOFT TISSUES: No abnormal enhancement is seen of the lumbar spine. No    paraspinal mass identified. L1-L2: Minimal degenerative loss of disc space height and signal.  There is    no significant disc protrusion, spinal canal stenosis or neural foraminal    narrowing. L2-L3: Moderate disc space is identified with mild circumferential disc    bulge. Minimal central canal stenosis. Mild facet arthropathy. No central    foramina. No focal disc protrusion. L3-L4: Mild disc space disease identified with circumferential disc bulge. There is mild right and moderate left neural foraminal narrowing. Moderate    degenerate facet arthropathy. Mild central canal stenosis. L4-L5: Mild disc space disease identified with circumferential disc bulge. There is a right paracentral disc extrusion which extends caudally 1.2 cm and    effaces the right lateral recess affecting the traversing right L5 nerve    root. Otherwise mild neural foraminal narrowing. No central canal stenosis. Moderate facet arthropathy. L5-S1: Minimal degenerative loss of disc space height and signal.  No focal    disc protrusion. Minimal neural foraminal narrowing. Mild-to-moderate facet    arthropathy.             Impression    Right paracentral disc extrusion L4-5 narrowing the right lateral recess. Please correlate with possible right L5 radiculopathy. This is new from    previous MRI. Otherwise mild degenerate change. No evidence of discitis osteomyelitis or epidural abscess. Clinical  impression:  1. Lumbosacral spondylosis without myelopathy    2. DDD (degenerative disc disease), lumbar    3. Lumbar radiculopathy, chronic    4. Sacroiliitis, not elsewhere classified ([address]    5. Spondylarthrosis    6. DDD (degenerative disc disease), cervical    7. Bilateral occipital neuralgia    8. Nonintractable migraine, unspecified migraine type    9. Chronic pain of left knee    10. Morbid obesity ([address]    11. Medication monitoring encounter    12. Chronic bilateral low back pain with sciatica, sciatica laterality unspecified    13. Neuropathy        Plan of care: We will continue current pain medications  Current medications are being tolerated without any Adverse side effects. Orders Placed This Encounter   Medications    DISCONTD: oxyCODONE-acetaminophen (PERCOCET) 5-325 MG per tablet     Sig: Take 1 tablet by mouth See Admin Instructions for 30 days. Intended supply: 30 days. 1 tab 3-4 times a day prn     Dispense:  100 tablet     Refill:  0     Reduce doses taken as pain becomes manageable    topiramate (TOPAMAX) 100 MG tablet     Sig: Take 1 tablet by mouth nightly     Dispense:  90 tablet     Refill:  2    gabapentin (NEURONTIN) 300 MG capsule     Sig: Take 1 capsule by mouth 3 times daily for 30 days. Dispense:  90 capsule     Refill:  2    oxyCODONE-acetaminophen (PERCOCET) 5-325 MG per tablet     Sig: Take 1 tablet by mouth See Admin Instructions for 30 days. Intended supply: 30 days. 1 tab 3-4 times a day prn     Dispense:  100 tablet     Refill:  0     Reduce doses taken as pain becomes manageable     Urine drug screens have been appropriate. No aberrant activity noted. Analgesia is achieved. Activities of daily living are possible because of medications. Safe use of medications explained to patient. PDMP Monitoring:    Last PDMP Job Foots as Reviewed [facility]):  Review User Review Instant Review Result   Jeannie Morel 11/29/2020  7:02 AM Reviewed PDMP [1]     Counselling/Preventive measures for pain  Control:    [x]  Spine strengthening exercises are discussed with patient in detail. [x] Ill effects of being on chronic pain medications such as sleep disturbances, hormonal changes, withdrawal symptoms,  chronic opioid dependence and tolerance were discussed with patient. I had asked the patient to minimize medication use and utilize pain medications only for uncontrolled rest pain or pain with exertional activities. I advised patient not to self escalate pain medications without consulting with us. At each of patient's future visits we will try to taper pain medications, while adjusting the adjunct medications, and re-evaluating for Physical Therapy to improve spinal and joint strength. We will continue to have discussions to decrease pain medications as tolerated. I also discussed with the patient regarding the dangers of combining narcotic pain medication with tranquilizers, alcohol or illegal drugs or taking the medication any other than prescribed. The dangers including the respiratory depression and death. Patient was told to tell  to all  physicians regarding the medications he is getting from pain clinic. Patient is warned not to take any unprescribed medications over-the-counter medications that can depress breathing . Patient is advised to talk to the pharmacist or physicians if planning to take any over-the-counter medications before  takeing them. Patient is strongly advised to avoid tranquilizers or  Relaxants for any medications that can depress breathing or recreational drugs. Patient is also advised to tell us if there is any changes in his medications from other physicians.    We discussed the same at today's visit and have not been to implement it, as the patient's pain is not under control with current medications. Decision Making Process : Patient's health history and referral records thoroughly reviewed before focused physical examination and discussion with patient. I have spent 20 mins. Over 50% of today's visit is spent on examining the patient and counseling and coordinating the care. Level of complexity of date to be reviewed is Moderate. The chart date reviewed include the following: Imaging Reports. Summary of Care. Time spent reviewing with patient the below reports:   Medication safety, Treatment options. Level of diagnosis and management options of this case is multiple: involving the following management options: Interventions as needed, medication management as appropriate, future visits, activity modification, heat/ice as needed, Urine drug screen as required. [x]The patient's questions were answered to the best of my abilities. This note was created using voice recognition software. There may be inaccuracies of transcription  that are inadvertently overlooked prior to the signature. There is any questions about the transcription please contact me. Return in  4 weeks  with physician / CNP  for further plan of treatment. Due to the COVID-19 pandemic and the appropriate interventions by Luis Felipe Shelton, our non-urgent pain management patients will not be seen in the office at this time for their protection and the protection of our staff.  To offer continuity of care, their prescriptions will be escribed this month after a careful chart review and review of their OARRS report  Pursuant to the emergency declaration under the [employer] [facility] waiver authority and the [employer] and [employer] Act, this Virtual Visit was conducted, with patient's consent, to reduce the patient's risk of exposure to COVID-19 and provide continuity of care for an established patient. Services were provided through a video synchronous discussion virtually to substitute for in-person appointment. \"  Documentation:  I communicated with the patient and/or health care decision maker about plan of care  Details of this discussion including any medical advice provided: Total Time: minutes: 21-30 minutes    I affirm this is a Patient Initiated Episode with an Established Patient who has not had a related appointment within my department in the past 7 days or scheduled within the next 24 hours.     Electronically signed by Mayuri Nagy MD on 11/30/2020 at 8:54 PM

## 2020-11-30 ENCOUNTER — HOSPITAL ENCOUNTER (OUTPATIENT)
Dept: PAIN MANAGEMENT | Age: 70
Discharge: HOME | End: 2020-11-30
Payer: COMMERCIAL

## 2020-11-30 PROCEDURE — 99213 OFFICE O/P EST LOW 20 MIN: CPT

## 2020-11-30 PROCEDURE — 99443 PR PHYS/QHP TELEPHONE EVALUATION 21-30 MIN: CPT | Performed by: PAIN MEDICINE

## 2020-11-30 RX ORDER — OXYCODONE HYDROCHLORIDE AND ACETAMINOPHEN 5; 325 MG/1; MG/1
1 TABLET ORAL SEE ADMIN INSTRUCTIONS
Qty: 100 TABLET | Refills: 0 | Status: SHIPPED | OUTPATIENT
Start: 2020-12-01 | End: 2020-12-18 | Stop reason: SDUPTHER

## 2020-11-30 RX ORDER — TOPIRAMATE 100 MG/1
100 TABLET, FILM COATED ORAL NIGHTLY
Qty: 90 TABLET | Refills: 2 | Status: SHIPPED | OUTPATIENT
Start: 2020-11-30 | End: 2021-10-25

## 2020-11-30 RX ORDER — GABAPENTIN 300 MG/1
300 CAPSULE ORAL 3 TIMES DAILY
Qty: 90 CAPSULE | Refills: 2 | Status: SHIPPED | OUTPATIENT
Start: 2020-11-30 | End: 2021-02-16

## 2020-11-30 RX ORDER — OXYCODONE HYDROCHLORIDE AND ACETAMINOPHEN 5; 325 MG/1; MG/1
1 TABLET ORAL SEE ADMIN INSTRUCTIONS
Qty: 100 TABLET | Refills: 0 | Status: SHIPPED | OUTPATIENT
Start: 2020-11-03 | End: 2020-11-30 | Stop reason: SDUPTHER

## 2020-11-30 ASSESSMENT — ENCOUNTER SYMPTOMS: EYE PAIN: 0

## 2020-12-02 RX ORDER — INSULIN GLARGINE 100 [IU]/ML
INJECTION, SOLUTION SUBCUTANEOUS
Qty: 15 ML | Refills: 3 | Status: SHIPPED | OUTPATIENT
Start: 2020-12-02 | End: 2021-03-16

## 2020-12-18 ENCOUNTER — HOSPITAL ENCOUNTER (OUTPATIENT)
Dept: PAIN MANAGEMENT | Age: 70
Discharge: HOME | End: 2020-12-18
Payer: COMMERCIAL

## 2020-12-18 PROCEDURE — 99213 OFFICE O/P EST LOW 20 MIN: CPT

## 2020-12-18 PROCEDURE — 99442 PR PHYS/QHP TELEPHONE EVALUATION 11-20 MIN: CPT | Performed by: NURSE PRACTITIONER

## 2020-12-18 RX ORDER — OXYCODONE HYDROCHLORIDE AND ACETAMINOPHEN 5; 325 MG/1; MG/1
1 TABLET ORAL SEE ADMIN INSTRUCTIONS
Qty: 100 TABLET | Refills: 0 | Status: SHIPPED | OUTPATIENT
Start: 2020-12-30 | End: 2021-01-29 | Stop reason: SDUPTHER

## 2020-12-18 ASSESSMENT — ENCOUNTER SYMPTOMS
BACK PAIN: 1
GASTROINTESTINAL NEGATIVE: 1
EYES NEGATIVE: 1

## 2020-12-18 NOTE — PROGRESS NOTES
[address] [facility] PROGRESS NOTE      Patient  completed []  video visit   [x]   phone call: 12   Minutes :   to  review Medication Agreement    Location:  Provider:  working from    [x]    home    []   [facility] - KELLEE VILLANUEVA , patient at  home   Chief Complaint: low back pain     She c/o low back pain which has not changed,The pain radiates down her legs, No history lumbar surgery, She had home pT, she does home exercises. No Ed visits. Back Pain  This is a chronic problem. The problem occurs intermittently. The problem is unchanged. The pain is present in the lumbar spine. The quality of the pain is described as aching. Radiates to: achey. The pain is at a severity of 6/10. The pain is worse during the night. The symptoms are aggravated by bending. Associated symptoms include headaches and weakness. (Left leg) Risk factors include menopause. She has tried analgesics for the symptoms. The treatment provided mild relief.        Treatment goals:  Functional status: reduce pain, walk more      Aberrancy:   Any alcoholic beverages    no        Any illegal drugs   no      Analgesia:      6               Adverse  Effects :none      ADL;s :home exercises    Data:    When was thelast UDS:     1-6-2020        Was the UDS appropriate:yes      Record/Diagnostics Review:      As above, I did review the imaging     1/9/2020  8:36 PM - [place], [facility] Incoming Lab Results From [facility]    Component Value Ref Range & Units Status Collected Lab   Pain Management Drug Panel Interp, Urine Consistent   Final 01/06/2020 11:40 AM [facility]   (NOTE)   ________________________________________________________________   DRUGS EXPECTED:   PERCOCET (OXYCODONE) [1/6/20]   ________________________________________________________________   CONSISTENT with medications provided:   PERCOCET (OXYCODONE) : based on oxycodone, noroxycodone   ________________________________________________________________   Drugs Not Included in this Assay: Acetaminophen   ________________________________________________________________   INTERPRETIVE INFORMATION: [facility] [place], [place] Spec/EMIT, Ur,                            Interp   Interpretation depends on accuracy and completeness of patient   medication information submitted by client. 6-Acetylmorphine, Ur Not Detected   Final                     Pill count: appropriate  fill date : 12-    Morphine equivalent dose as reported on [place]AR[place]:yes  Periodic Controlled Substance Monitoring: Possible medication side effects, risk of tolerance/dependence & alternative treatments discussed., No signs of potential drug abuse or diversion identified. , Assessed functional status., Obtaining appropriate analgesic effect of treatment. Edwina England, APRN - CNP)  Review of[place]ARRS does not show any aberrant prescription behavior. Medication is helping the patient stay active. Patient denies any side effects and reports adequate analgesia. No sign of misuse/abuse. Past Medical History:   Diagnosis Date    Allergic rhinitis     Anxiety 7/17/2013    Arthritis     Asthma     Atrial fibrillation (HCC)     Back pain     NERVE/DR. GRACIA    Benign hypertension with CKD (chronic kidney disease) stage III     CAD (coronary artery disease) 3/21/2013    Caffeine use     2 coffee/day    CHF (congestive heart failure) (HCC)     Chronic kidney disease     CKD (chronic kidney disease) stage 3, GFR 30-59 ml/min     COPD (chronic obstructive pulmonary disease) (HCC)     emphysema    Degeneration of lumbar or lumbosacral intervertebral disc     Depression     DM (diabetes mellitus) (HCC)     Emphysema of lung (HCC)     Gastritis     GERD (gastroesophageal reflux disease)     Hearing loss     Hematuria     Hiatal hernia     HTN (hypertension), benign 6/28/2014    Hypertriglyceridemia     Incontinence of urine 9/25/2013    Knee arthropathy     Lumbosacral spondylosis without myelopathy 9/29/2014    Migraine headache     DR. GRACIA    Mumps     Neuropathy     Obesity     On home oxygen therapy     2 L PER NC  HS AND PRN    HIGINIO on CPAP     Otitis media 1-26-15    Secondary diabetes mellitus with stage 3 chronic kidney disease (GFR 30-59) (HCC)     Stroke (HCC)     QUESTIONABLE    Tinnitus     Type 2 diabetes mellitus without complication (HCC)     Type II or unspecified type diabetes mellitus without mention of complication, not stated as uncontrolled     UTI (lower urinary tract infection)     Varicose vein        Past Surgical History:   Procedure Laterality Date    ABLATION OF DYSRHYTHMIC FOCUS  2000    CARPAL TUNNEL RELEASE Right     CATARACT REMOVAL WITH IMPLANT Bilateral     CHOLECYSTECTOMY  2007    COLONOSCOPY      COLONOSCOPY  04/10/2017    tubular adenomo polyp , mod. sigmoid diverticulosis, min. int. hemorrhoids    CYSTOSCOPY  9/25/2013    DILATION AND CURETTAGE  1979    EYE SURGERY      laser    INCONTINENCE SURGERY      Percutaneous nerve stimulation Dr Daniel Warren Nov 2013     INSERTABLE CARDIAC MONITOR  12/04/2015    [employer] REVEAL LINQ MODEL #[id number] MRI CONDTIONAL OK 3T. IMMEDIATELY POST IMPLANT    OTHER SURGICAL HISTORY  09/10/15    removal of interstim    [place] COLSC FLX W/REMOVAL LESION BY HOT BX FORCEPS N/A 4/10/2017    COLONOSCOPY POLYPECTOMY HOT BIOPSY performed by Tone Estrada MD at [address] [place] [address]      TYMPANOPLASTY      TYMPANOPLASTY      TYMPANOSTOMY TUBE PLACEMENT  08/21/2017    UPPER GASTROINTESTINAL ENDOSCOPY  03/2016    Dr Arnie Olivera       Allergies   Allergen Reactions    Robitussin [Guaifenesin] Anaphylaxis    Codeine Swelling    Compazine [Prochlorperazine Maleate] Hives and Swelling    Iodides Itching    Morphine Other (See Comments)     hallucinations    Moxifloxacin      Other reaction(s): Intolerance-unknown  Other reaction(s):  Intolerance-unknown    Reglan [Metoclopramide] Nausea Only    Avelox [Moxifloxacin Hcl In Nacl] Rash     Dermatitis      Cipro Xr Rash     dermatitis    Sulfa Antibiotics Rash         Current Outpatient Medications:     LANTUS SOLOSTAR 100 UNIT/ML injection pen, INJECT 42 UNITS INTO THE SKIN 2 TIMES DAILY , Disp: 15 mL, Rfl: 3    topiramate (TOPAMAX) 100 MG tablet, Take 1 tablet by mouth nightly, Disp: 90 tablet, Rfl: 2    gabapentin (NEURONTIN) 300 MG capsule, Take 1 capsule by mouth 3 times daily for 30 days. , Disp: 90 capsule, Rfl: 2    oxyCODONE-acetaminophen (PERCOCET) 5-325 MG per tablet, Take 1 tablet by mouth See Admin Instructions for 30 days. Intended supply: 30 days. 1 tab 3-4 times a day prn, Disp: 100 tablet, Rfl: 0    clopidogrel (PLAVIX) 75 MG tablet, TAKE 1 TAB BY MOUTH ONCE A DAY ( IN THE MORNING ) -THIS MEDICINE MAY BE TAKENWITH OR WITHOUT FOOD , Disp: 90 tablet, Rfl: 1    Icosapent Ethyl (VASCEPA) 1 g CAPS capsule, Take 1 capsule by mouth 2 times daily, Disp: 60 capsule, Rfl: 3    vitamin B-12 (CYANOCOBALAMIN) 100 MCG tablet, Take 50 mcg by mouth daily, Disp: , Rfl:     isosorbide dinitrate (ISORDIL) 30 MG tablet, Take 30 mg by mouth, Disp: , Rfl:     metFORMIN (GLUCOPHAGE) 1000 MG tablet, TAKE 1 TAB BY MOUTH TWICE A DAY ( IN THE MORNING AND BEDTIME ) , Disp: 180 tablet, Rfl: 3    ofloxacin (OCUFLOX) 0.3 % solution, , Disp: , Rfl:     gabapentin (NEURONTIN) 300 MG capsule, Take 1 capsule by mouth 3 times daily for 30 days. , Disp: 90 capsule, Rfl: 2    nystatin (MYCOSTATIN) 388478 UNIT/GM powder, Apply 3 times daily. , Disp: 3 Bottle, Rfl: 3    tobramycin-dexamethasone (TOBRADEX) 0.3-0.1 % ophthalmic suspension, , Disp: , Rfl:     aspirin 81 MG chewable tablet, Take 1 tablet by mouth daily, Disp: 90 tablet, Rfl: 3    ferrous sulfate 325 (65 Fe) MG tablet, TAKE 1 TAB BY MOUTH EVERY MORNING, Disp: 90 tablet, Rfl: 5    carvedilol (COREG) 3.125 MG tablet, TAKE 1 TAB BY MOUTH TWICE A DAY ( AM AND BEDTIME ), Disp: 180 tablet, Rfl: 3    pantoprazole (PROTONIX) 40 MG tablet, Take 1 tablet by mouth 2 times daily, Disp: 60 tablet, Rfl: 3    losartan (COZAAR) 25 MG tablet, TAKE 1 TAB BY MOUTH ONCE A DAY, Disp: 90 tablet, Rfl: 5    OXYGEN, Inhale 3 L into the lungs , Disp: , Rfl:     dicyclomine (BENTYL) 10 MG capsule, Take 1 capsule by mouth 2 times daily as needed (abdominal spasm), Disp: 180 capsule, Rfl: 0    albuterol (PROVENTIL) (2.5 MG/3ML) 0.083% nebulizer solution, Take 3 mLs by nebulization every 6 hours as needed for Wheezing, Disp: 120 each, Rfl: 3    furosemide (LASIX) 20 MG tablet, TAKE 1 TAB BY MOUTH ONCE A DAY AS NEEDED ( WEIGHT GAIN 3 LBS OVERNIGHT ) , Disp: 30 tablet, Rfl: 3    TRUE METRIX BLOOD GLUCOSE TEST strip, THREE TIMES A DAY , Disp: 100 each, Rfl: 3    zoster recombinant adjuvanted vaccine (SHINGRIX) 50 MCG/0.5ML SUSR injection, 50 MCG IM then repeat 2-6 months. (Patient not taking: Reported on 11/25/2020), Disp: 0.5 mL, Rfl: 1    ULTICARE MINI PEN NEEDLES 31G X 6 MM MISC, USE AS DIRECTED , Disp: 100 each, Rfl: 5    insulin aspart (NOVOLOG FLEXPEN) 100 UNIT/ML injection pen, If <139 - No Insulin; 140-199-2 Units;200-249-4 Units;250-299-6 Units;300-349-8 Units;350-400=10 Units; Above 400-12 Units, Disp: 5 pen, Rfl: 3    lidocaine (LMX) 4 % cream, Apply topically every 8 hrs as needed for pain, Disp: 45 g, Rfl: 3    Lift Chair MISC, by Does not apply route, Disp: 1 each, Rfl: 0    Misc.  Devices (ADJUST BATH/SHOWER SEAT/BACK) MISC, Use daily for shower, Disp: 1 each, Rfl: 0    magnesium oxide (MAG-OX) 400 MG tablet, TAKE 1 TAB BY MOUTH TWICE A DAY ( AM AND BEDTIME ), Disp: 180 tablet, Rfl: 3    citalopram (CELEXA) 40 MG tablet, TAKE 1/2  TAB BY MOUTH TWICE  A DAY, Disp: 90 tablet, Rfl: 3    Alcohol Swabs (B-D SINGLE USE SWABS REGULAR) PADS, THREE TIMES A DAY, Disp: 100 each, Rfl: 0    Lift Chair MISC, by Does not apply route Use daily at home to help with ADL's, Disp: 1 each, Rfl: 0    nitroGLYCERIN (NITROSTAT) 0.4 MG SL tablet, 1 under the tongue as Stress: Not on file   Relationships    Social connections     Talks on phone: Not on file     Gets together: Not on file     Attends Taoist service: Not on file     Active member of club or organization: Not on file     Attends meetings of clubs or organizations: Not on file     Relationship status: Not on file    Intimate partner violence     Fear of current or ex partner: Not on file     Emotionally abused: Not on file     Physically abused: Not on file     Forced sexual activity: Not on file   Other Topics Concern    Not on file   Social History Narrative    Not on file         Review of Systems:  Review of Systems   Constitution: Negative. HENT: Negative. Eyes: Negative. Cardiovascular: Negative. Respiratory:        Uses oxygen   Endocrine:        Diabetic:   Hematologic/Lymphatic: Bruises/bleeds easily. Skin: Negative. Musculoskeletal: Positive for back pain and joint pain. Gastrointestinal: Negative. Genitourinary: Negative. Neurological: Positive for headaches and weakness. Uses walker, left leg mik         Physical Exam:  LMP  (LMP Unknown)     Physical Exam  Skin:         Neurological:      Mental Status: She is alert and oriented to person, place, and time. Psychiatric:         Mood and Affect: Mood normal.           Assessment:    Problem List Items Addressed This Visit     Spondylarthrosis - Primary    Sacroiliitis, not elsewhere classified (HCC) (Chronic)    Osteoarthritis of cervical spine (Chronic)    Lumbosacral spondylosis without myelopathy (Chronic)    Lumbar radiculopathy, chronic    DDD (degenerative disc disease), cervical (Chronic)              Treatment Plan:  DISCUSSION: Treatment options discussed withpatient and all questions answered to patient's satisfaction.      Possible side effects, risk of tolerance and or dependence and alternative treatments discussed    Obtaining appropriate analgesic effect of treatment   No signs of potential drug abuse or diversion identified    [x] Ill effects of being on chronic pain medications such as sleep disturbances, respiratory depression, hormonal changes, withdrawal symptoms, chronic opioid dependence and tolerance as well as risk of taking opioids with Benzodiazepines and taking opioids along with alcohol,  werediscussed with patient. I had asked the patient to minimize medication use and utilize pain medications only for uncontrolled rest pain or pain with exertional activities. I advised patient not to self-escalate painmedications without consulting with us. At each of patient's future visits we will try to taper pain medications, while adjusting the adjunct medications, and re-evaluating for Physical Therapy to improve spinal andjoint strength. We will continue to have discussions to decrease pain medications as tolerated. Counseled patient on effects their pain medication and /or their medical condition mayhave on their  ability to drive or operate machinery. Instructed not to drive or operate machinery if drowsy     I also discussed with the patient regarding the dangers of combining narcotic pain medication with tranquilizers, alcohol or illegal drugs or taking the medication any way other than prescribed. The dangers were discussed  including respiratory depression and death. Patient was told to tell  all  physicians regarding the medications he is getting from pain clinic. Patient is warned not to take any unprescribed medications over-the-countermedications that can depress breathing . Patient is advised to talk to the pharmacist or physicians if planning to take any over-the-counter medications before  takeing them. Patient is strongly advised to avoid tranquilizers or  relaxants, illegal drugs  or any medications that can depress breathing  Patient is also advised to tell us if there is any changes in their medications from other physicians.             TREATMENT OPTIONS:       Medication Agreement Requirements Met  Continue Opioid therapy  Script written for percocet  Follow up appointment made

## 2020-12-30 ENCOUNTER — TELEPHONE (OUTPATIENT)
Dept: PAIN MANAGEMENT | Age: 70
End: 2020-12-30

## 2020-12-30 NOTE — TELEPHONE ENCOUNTER
Patient called because she thought she had missed an appointment.  Informed her she has a Rx pending at her pharmacy and her next appointment is Jan 29, 2021

## 2021-01-05 ENCOUNTER — TELEPHONE (OUTPATIENT)
Dept: FAMILY MEDICINE CLINIC | Age: 71
End: 2021-01-05

## 2021-01-05 DIAGNOSIS — K63.5 SESSILE COLONIC POLYP: Primary | ICD-10-CM

## 2021-01-05 NOTE — TELEPHONE ENCOUNTER
patient needs new referral to Dr Mason Adkins  ( GI ) Colonoscopy. Please let her know when done?  Thanks you

## 2021-01-06 ENCOUNTER — TELEPHONE (OUTPATIENT)
Dept: GASTROENTEROLOGY | Age: 71
End: 2021-01-06

## 2021-01-06 NOTE — TELEPHONE ENCOUNTER
Thalia Spivey called to schedule colonoscopy per referral from Dr. Ravi Parrish. Last colon was done inpatient in 2017 to be repeated in three years. Patient is established in our practice with Dr. Marie Garcia is on Plavix and so office visit is now scheduled for her on 1/26/21. Appointment card mailed.

## 2021-01-15 DIAGNOSIS — F41.9 ANXIETY AND DEPRESSION: ICD-10-CM

## 2021-01-15 DIAGNOSIS — I10 HTN (HYPERTENSION), BENIGN: Chronic | ICD-10-CM

## 2021-01-15 DIAGNOSIS — I10 ESSENTIAL HYPERTENSION: ICD-10-CM

## 2021-01-15 DIAGNOSIS — E83.42 HYPOMAGNESEMIA: ICD-10-CM

## 2021-01-15 DIAGNOSIS — D64.9 ANEMIA, UNSPECIFIED TYPE: ICD-10-CM

## 2021-01-15 DIAGNOSIS — F32.A ANXIETY AND DEPRESSION: ICD-10-CM

## 2021-01-15 RX ORDER — CITALOPRAM 40 MG/1
TABLET ORAL
Qty: 90 TABLET | Refills: 3 | Status: SHIPPED | OUTPATIENT
Start: 2021-01-15 | End: 2021-11-24 | Stop reason: DRUGHIGH

## 2021-01-15 RX ORDER — CARVEDILOL 3.12 MG/1
TABLET ORAL
Qty: 180 TABLET | Refills: 3 | Status: SHIPPED | OUTPATIENT
Start: 2021-01-15 | End: 2022-01-21

## 2021-01-15 RX ORDER — LOSARTAN POTASSIUM 25 MG/1
TABLET ORAL
Qty: 90 TABLET | Refills: 5 | Status: SHIPPED | OUTPATIENT
Start: 2021-01-15 | End: 2022-02-21

## 2021-01-15 RX ORDER — FERROUS SULFATE 325(65) MG
TABLET ORAL
Qty: 90 TABLET | Refills: 5 | Status: SHIPPED | OUTPATIENT
Start: 2021-01-15 | End: 2022-02-24

## 2021-01-15 RX ORDER — MAGNESIUM OXIDE 400 MG/1
TABLET ORAL
Qty: 180 TABLET | Refills: 3 | Status: SHIPPED | OUTPATIENT
Start: 2021-01-15 | End: 2022-02-04

## 2021-01-21 RX ORDER — CALCIUM CITRATE/VITAMIN D3 200MG-6.25
TABLET ORAL
Qty: 100 EACH | Refills: 3 | Status: SHIPPED | OUTPATIENT
Start: 2021-01-21 | End: 2022-07-25

## 2021-01-21 NOTE — TELEPHONE ENCOUNTER
Patient has been checking her sugars a little more since she had a new medication added. She would like to see if she can get a new script for test strips stating she is testing 3-4 times daily.      RX: [facility]

## 2021-01-26 ENCOUNTER — OFFICE VISIT (OUTPATIENT)
Dept: GASTROENTEROLOGY | Age: 71
End: 2021-01-26
Payer: COMMERCIAL

## 2021-01-26 VITALS
BODY MASS INDEX: 47.15 KG/M2 | SYSTOLIC BLOOD PRESSURE: 143 MMHG | WEIGHT: 257.8 LBS | DIASTOLIC BLOOD PRESSURE: 74 MMHG | TEMPERATURE: 97.6 F

## 2021-01-26 DIAGNOSIS — K58.2 IRRITABLE BOWEL SYNDROME WITH BOTH CONSTIPATION AND DIARRHEA: Primary | ICD-10-CM

## 2021-01-26 DIAGNOSIS — E66.01 MORBID OBESITY (HCC): ICD-10-CM

## 2021-01-26 DIAGNOSIS — R14.0 ABDOMINAL BLOATING: ICD-10-CM

## 2021-01-26 DIAGNOSIS — K21.9 GASTROESOPHAGEAL REFLUX DISEASE, UNSPECIFIED WHETHER ESOPHAGITIS PRESENT: ICD-10-CM

## 2021-01-26 DIAGNOSIS — D12.2 ADENOMATOUS POLYP OF ASCENDING COLON: ICD-10-CM

## 2021-01-26 PROCEDURE — 99204 OFFICE O/P NEW MOD 45 MIN: CPT | Performed by: INTERNAL MEDICINE

## 2021-01-26 PROCEDURE — G8428 CUR MEDS NOT DOCUMENT: HCPCS | Performed by: INTERNAL MEDICINE

## 2021-01-26 PROCEDURE — 1036F TOBACCO NON-USER: CPT | Performed by: INTERNAL MEDICINE

## 2021-01-26 PROCEDURE — G8417 CALC BMI ABV UP PARAM F/U: HCPCS | Performed by: INTERNAL MEDICINE

## 2021-01-26 PROCEDURE — 3017F COLORECTAL CA SCREEN DOC REV: CPT | Performed by: INTERNAL MEDICINE

## 2021-01-26 PROCEDURE — 1090F PRES/ABSN URINE INCON ASSESS: CPT | Performed by: INTERNAL MEDICINE

## 2021-01-26 PROCEDURE — 1123F ACP DISCUSS/DSCN MKR DOCD: CPT | Performed by: INTERNAL MEDICINE

## 2021-01-26 PROCEDURE — G8399 PT W/DXA RESULTS DOCUMENT: HCPCS | Performed by: INTERNAL MEDICINE

## 2021-01-26 PROCEDURE — G8484 FLU IMMUNIZE NO ADMIN: HCPCS | Performed by: INTERNAL MEDICINE

## 2021-01-26 PROCEDURE — 4040F PNEUMOC VAC/ADMIN/RCVD: CPT | Performed by: INTERNAL MEDICINE

## 2021-01-26 RX ORDER — POLYETHYLENE GLYCOL 3350 17 G/17G
POWDER, FOR SOLUTION ORAL
Qty: 238 G | Refills: 0 | Status: ON HOLD | OUTPATIENT
Start: 2021-01-26 | End: 2021-03-02 | Stop reason: ALTCHOICE

## 2021-01-26 ASSESSMENT — ENCOUNTER SYMPTOMS
SORE THROAT: 0
VOICE CHANGE: 0
ABDOMINAL DISTENTION: 1
CHOKING: 0
TROUBLE SWALLOWING: 0
CONSTIPATION: 1
ABDOMINAL PAIN: 1
VOMITING: 1
NAUSEA: 1
DIARRHEA: 1
COUGH: 1

## 2021-01-26 NOTE — PROGRESS NOTES
GI OFFICE FOLLOW UP    Anali Araujo is a 69 y.o. female evaluated via on 1/26/2021. Consent:  She and/or health care decision maker is aware that that she may receive a bill for this telephone service, depending on her insurance coverage, and has provided verbal consent to proceed: YES      INTERVAL HISTORY:   MD Rustam Gamboa [address] [facility],  [facility]    Chief Complaint   Patient presents with    Gastroesophageal Reflux     2018pt is having GERD burning, abd pain and bloating    GI Problem     last colon 2017 with Dr Sheela Jordan, states she is due for a colonscopy       1. Irritable bowel syndrome with both constipation and diarrhea    2. Adenomatous polyp of ascending colon    3. Morbid obesity ([address]    4. Gastroesophageal reflux disease, unspecified whether esophagitis present    5. Abdominal bloating      This patient evaluated in my office after 2018 she has been seen previous history for acid reflux indigestion  History for colon polyps irritable bowel syndrome-like symptoms and obesity    Patient has been complaining of some abdominal pains, off and on cramping  Also complains of abdominal bloating and gas  Has off and on nausea without any sig vomiting  Has some alternating constipation and diarrhea  Has no weight loss  Has some anxiety issues    No overt rectal bleeding melanotic stools    Denies smoking alcohol abuse illicit drug usage    HISTORY OF PRESENT ILLNESS: Ms.Charlotte Abby Gan is a 69 y.o. female with a past history remarkable for , referred for evaluation of   Chief Complaint   Patient presents with    Gastroesophageal Reflux     2018pt is having GERD burning, abd pain and bloating    GI Problem     last colon 2017 with Dr Sheela Jordan, states she is due for a colonscopy   .     Past Medical,Family, and Social History reviewed and does contribute to the patient presenting condition. Patient's PMH/PSH,SH,PSYCH Hx, MEDs, ALLERGIES, and ROS were all reviewed and updated in the appropriate sections. PAST MEDICAL HISTORY:  Past Medical History:   Diagnosis Date    Allergic rhinitis     Anxiety 7/17/2013    Arthritis     Asthma     Atrial fibrillation ([facility])     Back pain     NERVE/DR. GRACIA    Benign hypertension with CKD (chronic kidney disease) stage III     CAD (coronary artery disease) 3/21/2013    Caffeine use     2 coffee/day    CHF (congestive heart failure) ([facility])     Chronic kidney disease     CKD (chronic kidney disease) stage 3, GFR 30-59 ml/min     COPD (chronic obstructive pulmonary disease) ([facility])     emphysema    Degeneration of lumbar or lumbosacral intervertebral disc     Depression     DM (diabetes mellitus) ([facility])     Emphysema of lung ([facility])     Gastritis     GERD (gastroesophageal reflux disease)     Hearing loss     Hematuria     Hiatal hernia     HTN (hypertension), benign 6/28/2014    Hypertriglyceridemia     Incontinence of urine 9/25/2013    Irritable bowel syndrome with both constipation and diarrhea 1/26/2021    Knee arthropathy     Lumbosacral spondylosis without myelopathy 9/29/2014    Migraine headache     DR. GRACIA    Mumps     Neuropathy     Obesity     On home oxygen therapy     2 L PER NC  HS AND PRN    HIGINIO on CPAP     Otitis media 1-26-15    Secondary diabetes mellitus with stage 3 chronic kidney disease (GFR 30-59) ([facility])     Stroke ([facility])     QUESTIONABLE    Tinnitus     Type 2 diabetes mellitus without complication ([facility])     Type II or unspecified type diabetes mellitus without mention of complication, not stated as uncontrolled     UTI (lower urinary tract infection)     Varicose vein        Past Surgical History:   Procedure Laterality Date    ABLATION OF DYSRHYTHMIC FOCUS  2000    CARPAL TUNNEL RELEASE Right     CATARACT REMOVAL WITH IMPLANT Bilateral     CHOLECYSTECTOMY  2007    COLONOSCOPY      COLONOSCOPY  04/10/2017    tubular adenomo polyp , mod. sigmoid diverticulosis, min. int. hemorrhoids    CYSTOSCOPY  9/25/2013    DILATION AND CURETTAGE  1979    EYE SURGERY      laser    INCONTINENCE SURGERY      Percutaneous nerve stimulation Dr Jesus Manuel Luevano Nov 2013    [facility] INSERTABLE CARDIAC MONITOR  12/04/2015    [employer] REVEAL LINQ MODEL #[id number] MRI CONDTIONAL OK 3T. IMMEDIATELY POST IMPLANT    OTHER SURGICAL HISTORY  09/10/15    removal of interstim    VT COLSC FLX W/REMOVAL LESION BY HOT BX FORCEPS N/A 4/10/2017    COLONOSCOPY POLYPECTOMY HOT BIOPSY performed by Riccardo Wyatt MD at [address] [facility] [address]      TYMPANOPLASTY      TYMPANOSTOMY TUBE PLACEMENT  08/21/2017    UPPER GASTROINTESTINAL ENDOSCOPY  03/2016    Dr Vibha Tobias:    Current Outpatient Medications:     polyethylene glycol (MIRALAX) 17 GM/SCOOP powder, Use as Directed per instructions provided by physician office. , Disp: 238 g, Rfl: 0    bisacodyl (DULCOLAX) 5 MG EC tablet, Use as directed per instructions provided by physician office, Disp: 4 tablet, Rfl: 0    blood glucose test strips (TRUE METRIX BLOOD GLUCOSE TEST) strip, THREE TIMES A DAY, Disp: 100 each, Rfl: 3    ferrous sulfate (IRON 325) 325 (65 Fe) MG tablet, TAKE 1 TAB BY MOUTH EVERY MORNING , Disp: 90 tablet, Rfl: 5    citalopram (CELEXA) 40 MG tablet, TAKE 1 2 TABLET BY MOUTH TWICE A DAY, Disp: 90 tablet, Rfl: 3    carvedilol (COREG) 3.125 MG tablet, TAKE 1 TAB BY MOUTH TWICE A DAY ( IN THE MORNING AND BEDTIME ) , Disp: 180 tablet, Rfl: 3    losartan (COZAAR) 25 MG tablet, TAKE 1 TAB BY MOUTH ONCE A DAY ( IN THE MORNING ), Disp: 90 tablet, Rfl: 5    magnesium oxide (MAG-OX) 400 MG tablet, TAKE 1 TAB BY MOUTH TWICE A DAY ( IN THE MORNING AND BEDTIME ), Disp: 180 tablet, Rfl: 3    oxyCODONE-acetaminophen (PERCOCET) 5-325 MG per tablet, Take 1 tablet by mouth See Admin Instructions for 30 days. Intended supply: 30 days. 1 tab 3-4 times a day prn, Disp: 100 tablet, Rfl: 0    LANTUS SOLOSTAR 100 UNIT/ML injection pen, INJECT 42 UNITS INTO THE SKIN 2 TIMES DAILY , Disp: 15 mL, Rfl: 3    topiramate (TOPAMAX) 100 MG tablet, Take 1 tablet by mouth nightly, Disp: 90 tablet, Rfl: 2    gabapentin (NEURONTIN) 300 MG capsule, Take 1 capsule by mouth 3 times daily for 30 days. , Disp: 90 capsule, Rfl: 2    dicyclomine (BENTYL) 10 MG capsule, Take 1 capsule by mouth 2 times daily as needed (abdominal spasm), Disp: 180 capsule, Rfl: 0    albuterol (PROVENTIL) (2.5 MG/3ML) 0.083% nebulizer solution, Take 3 mLs by nebulization every 6 hours as needed for Wheezing, Disp: 120 each, Rfl: 3    furosemide (LASIX) 20 MG tablet, TAKE 1 TAB BY MOUTH ONCE A DAY AS NEEDED ( WEIGHT GAIN 3 LBS OVERNIGHT ) , Disp: 30 tablet, Rfl: 3    clopidogrel (PLAVIX) 75 MG tablet, TAKE 1 TAB BY MOUTH ONCE A DAY ( IN THE MORNING ) -THIS MEDICINE MAY BE TAKENWITH OR WITHOUT FOOD , Disp: 90 tablet, Rfl: 1    zoster recombinant adjuvanted vaccine (SHINGRIX) 50 MCG/0.5ML SUSR injection, 50 MCG IM then repeat 2-6 months. (Patient not taking: Reported on 11/25/2020), Disp: 0.5 mL, Rfl: 1    Icosapent Ethyl (VASCEPA) 1 g CAPS capsule, Take 1 capsule by mouth 2 times daily, Disp: 60 capsule, Rfl: 3    vitamin B-12 (CYANOCOBALAMIN) 100 MCG tablet, Take 50 mcg by mouth daily, Disp: , Rfl:     isosorbide dinitrate (ISORDIL) 30 MG tablet, Take 30 mg by mouth, Disp: , Rfl:     ULTICARE MINI PEN NEEDLES 31G X 6 MM MISC, USE AS DIRECTED , Disp: 100 each, Rfl: 5    metFORMIN (GLUCOPHAGE) 1000 MG tablet, TAKE 1 TAB BY MOUTH TWICE A DAY ( IN THE MORNING AND BEDTIME ) , Disp: 180 tablet, Rfl: 3    ofloxacin (OCUFLOX) 0.3 % solution, , Disp: , Rfl:     gabapentin (NEURONTIN) 300 MG capsule, Take 1 capsule by mouth 3 times daily for 30 days. , Disp: 90 capsule, Rfl: 2    nystatin (MYCOSTATIN) 369228 UNIT/GM powder, Apply 3 times daily. , Disp: 3 Bottle, Rfl: 3    tobramycin-dexamethasone (TOBRADEX) 0.3-0.1 % ophthalmic suspension, , Disp: , Rfl:     insulin aspart (NOVOLOG FLEXPEN) 100 UNIT/ML injection pen, If <139 - No Insulin; 140-199-2 Units;200-249-4 Units;250-299-6 Units;300-349-8 Units;350-400=10 Units; Above 400-12 Units, Disp: 5 pen, Rfl: 3    aspirin 81 MG chewable tablet, Take 1 tablet by mouth daily, Disp: 90 tablet, Rfl: 3    lidocaine (LMX) 4 % cream, Apply topically every 8 hrs as needed for pain, Disp: 45 g, Rfl: 3    Lift Chair MISC, by Does not apply route, Disp: 1 each, Rfl: 0    Misc.  Devices (ADJUST BATH/SHOWER SEAT/BACK) MISC, Use daily for shower, Disp: 1 each, Rfl: 0    pantoprazole (PROTONIX) 40 MG tablet, Take 1 tablet by mouth 2 times daily, Disp: 60 tablet, Rfl: 3    Alcohol Swabs (B-D SINGLE USE SWABS REGULAR) PADS, THREE TIMES A DAY, Disp: 100 each, Rfl: 0    Lift Chair MISC, by Does not apply route Use daily at home to help with ADL's, Disp: 1 each, Rfl: 0    nitroGLYCERIN (NITROSTAT) 0.4 MG SL tablet, 1 under the tongue as needed for angina, may repeat q5mins for up three doses, Disp: , Rfl:     Blood Glucose Monitoring Suppl HARMEET, Use daily to check BS, Disp: 1 Device, Rfl: 0    ipratropium-albuterol (DUONEB) 0.5-2.5 (3) MG/3ML SOLN nebulizer solution, Inhale 3 mLs into the lungs every 4 hours, Disp: 360 mL, Rfl: 2    Melatonin 10 MG TABS, Take 10 mg by mouth nightly, Disp: 90 tablet, Rfl: 3    Compression Stockings MISC, by Does not apply route Pressure between 20 - 25 ., Disp: 1 each, Rfl: 0    docusate sodium (COLACE) 100 MG capsule, Take 1 capsule by mouth 2 times daily Please use while taking Percocet, Disp: 30 capsule, Rfl: 0    SYMBICORT 160-4.5 MCG/ACT AERO,  2 puffs As needed for sob, Disp: , Rfl:     OXYGEN, Inhale 3 L into the lungs , Disp: , Rfl:     ALLERGIES:   Allergies   Allergen Reactions    Robitussin [Guaifenesin] Anaphylaxis    Codeine Swelling    Compazine [Prochlorperazine Maleate] Hives and Swelling    Iodides Itching    Morphine Other (See Comments)     hallucinations    Moxifloxacin      Other reaction(s): Intolerance-unknown  Other reaction(s):  Intolerance-unknown    Reglan [Metoclopramide] Nausea Only    Avelox [Moxifloxacin Hcl In Nacl] Rash     Dermatitis      Cipro Xr Rash     dermatitis    Sulfa Antibiotics Rash       FAMILY HISTORY:       Problem Relation Age of Onset    Diabetes Mother     Heart Disease Mother     Stomach Cancer Father     Diabetes Maternal Grandmother         also aunts and uncles (maternal)    Emphysema Sister          SOCIAL HISTORY:   Social History     Socioeconomic History    Marital status: Legally      Spouse name: Not on file    Number of children: Not on file    Years of education: Not on file    Highest education level: Not on file   Occupational History    Occupation: disabled     Employer: N/A   Social Needs    Financial resource strain: Not on file    Food insecurity     Worry: Not on file     Inability: Not on file    Transportation needs     Medical: Not on file     Non-medical: Not on file   Tobacco Use    Smoking status: Former Smoker     Packs/day: 3.00     Years: 41.00     Pack years: 123.00     Types: Cigarettes     Quit date: 2000     Years since quittin.0    Smokeless tobacco: Never Used    Tobacco comment: quit in    Substance and Sexual Activity    Alcohol use: No     Alcohol/week: 0.0 standard drinks     Frequency: Never    Drug use: No    Sexual activity: Not Currently   Lifestyle    Physical activity     Days per week: Not on file     Minutes per session: Not on file    Stress: Not on file   Relationships    Social connections     Talks on phone: Not on file     Gets together: Not on file     Attends Hinduism service: Not on file     Active member of club or organization: Not on file     Attends meetings of clubs or organizations: Not on file     Relationship status: Not on file    Intimate partner violence     Fear of current or ex partner: Not on file     Emotionally abused: Not on file     Physically abused: Not on file     Forced sexual activity: Not on file   Other Topics Concern    Not on file   Social History Narrative    Not on file         REVIEW OF SYSTEMS:         Review of Systems   HENT: Negative for sore throat, trouble swallowing and voice change. Eyes: Positive for visual disturbance. Respiratory: Positive for cough. Negative for choking. Gastrointestinal: Positive for abdominal distention, abdominal pain, constipation, diarrhea, nausea and vomiting. GERD   Musculoskeletal: Positive for gait problem. PHYSICAL EXAMINATION: Vital signs reviewed per the nursing documentation. BP (!) 143/74   Temp 97.6 °F (36.4 °C)   Wt 257 lb 12.8 oz (116.9 kg)   LMP  (LMP Unknown)   BMI 47.15 kg/m²   Body mass index is 47.15 kg/m². Physical Exam  Nursing note reviewed. Constitutional:       Appearance: She is well-developed. Comments: On scooter  Morbid ly obese     HENT:      Head: Normocephalic and atraumatic. Eyes:      Conjunctiva/sclera: Conjunctivae normal.      Pupils: Pupils are equal, round, and reactive to light. Neck:      Musculoskeletal: Normal range of motion and neck supple. Cardiovascular:      Heart sounds: Normal heart sounds. Pulmonary:      Effort: Pulmonary effort is normal.      Breath sounds: Normal breath sounds. Abdominal:      General: Bowel sounds are normal.      Palpations: Abdomen is soft. Comments: NON TENDER, NON DISTENTED  LIVER SPLEEN AND HERNIAS ARE NOT  PALPABLE  BOWEL SOUNDS ARE POSITIVE   Obese belly    Musculoskeletal: Normal range of motion. Right lower leg: Edema present. Left lower leg: Edema present. Skin:     General: Skin is warm. Neurological:      Mental Status: She is alert and oriented to person, place, and time.    Psychiatric:         Behavior: Behavior normal.           LABORATORY DATA: Reviewed  Lab Results   Component Value Date    WBC 7.8 10/30/2020    HGB 13.6 10/30/2020    HCT 42.6 10/30/2020    MCV 95.9 10/30/2020     10/30/2020     11/01/2020    K 5.0 11/01/2020     11/01/2020    CO2 26 11/01/2020    BUN 21 11/01/2020    CREATININE 1.23 (H) 11/01/2020    LABPROT 7.0 09/06/2012    LABALBU 4.0 10/30/2020    BILITOT 0.20 (L) 10/30/2020    ALKPHOS 62 10/30/2020    AST 21 10/30/2020    ALT 29 10/30/2020    INR 1.0 10/31/2019         Lab Results   Component Value Date    RBC 4.44 10/30/2020    HGB 13.6 10/30/2020    MCV 95.9 10/30/2020    MCH 30.6 10/30/2020    MCHC 31.9 10/30/2020    RDW 12.6 10/30/2020    MPV 11.6 10/30/2020    BASOPCT 0 10/30/2020    LYMPHSABS 1.09 (L) 10/30/2020    MONOSABS 0.40 10/30/2020    NEUTROABS 6.21 10/30/2020    EOSABS <0.03 10/30/2020    BASOSABS 0.03 10/30/2020         DIAGNOSTIC TESTING:     No results found. Assessment  1. Irritable bowel syndrome with both constipation and diarrhea    2. Adenomatous polyp of ascending colon    3. Morbid obesity ([address]    4. Gastroesophageal reflux disease, unspecified whether esophagitis present    5. Abdominal bloating        Plan    Plan EGD and colonoscopy to evaluate    The Endoscopic procedure was explained to the patient in detail  The prep and NPO were explained  All the Risks, Benefits, and Alternatives were explained  Risk of Bleeding, Perforation and Cardio Respiratory risks were explained  her questions were answered  The procedure has been scheduled with the  in the office  Patient was asked to give us a call for any questions  The patient has verbalized understanding and agreement to this plan. Pt was advised in detail about some life style and dietary modifications. She was advised about avoidance of caffeine, nicotine and chocolate. Pt was also told to stay away from any kind of fast foods, soda pops.  She was also advised to avoid lots of spices, grease and fried food etc. Instructions were also given about trying to arrange the timing, quality and quantity of food. Instructions were given about using ample amount of fiber including dietary and supplemental fiber either metamucil, bennafiber or citrucell etc.  Pt was advised about drinking ample amount of water without any colors or chemicals. Stress was given about regular exercise. Pt has verbalized understanding and agreement to these modifications. Pt seems to have signs and symptoms consistent with GERD, acid indigestion and heartburns. She was discussed  in detail about some possible life style and dietary modifications. She was stressed about the maintenance  of appropriate weight and effect of obesity contributing to reflux symptoms. Routine exercise was streesed. Avoidance of Caffeine, nicotine and chocolate were explained. Pt was asked to avoid spices grease and fried food. Advices were also given about avoidance of any kind of fast foods, soda pops and high energy drinks. Pt was advised to place two small block under the head end of the bed which may help with night time reflux. Was advised not to eat any thin at least 2-3 hrs before going to bed and walk especially after dinner    Pt has verbalized understanding and agreement to this plan. Pt was given advices and instructions about weight loss. She was advised about the significance of exercise at least three times a week . Dietary advices were also given about the avoidance of fast food, fried food and lots of spices and grease. nstructions were given about using ample amount of fiber including dietary and supplemental fiber either metamucil, bennafiber or citrucell etc.  Pt was advised about drinking ample amount of water without any colors or chemicals. Stress was given about regular exercise. Pt was told to stay way from soda pops.     Pt has verbalized understanding and agrrement  The patient was instructed to start taking some OTC Probiotics products   These are available over the counter at the Pharmacy stores and [address] [employer]  He was explained about the beneficial effects they have in the GI track  They will help to establish the good bacterial sue and will help with the digestion and bowel movements  The patient has verbalized understanding and agreement to this plan    More than half of patient's clinic visit time was spent in counseling about lifestyle and dietary modifications  Patient's  questions were answered in this regard as well  The patient has verbalized understanding and agreement       I communicated with the patient and/or health care decision maker about   Details of this discussion including any medical advice provided:YES      I affirm this is a Patient Initiated Episode with an Established Patient who has not had a related appointment within my department in the past 7 days or scheduled within the next 24 hours. Total Time: minutes: 21-30 minutes    Note: not billable if this call serves to triage the patient into an appointment for the relevant concern      Thank you for allowing me to participate in the care of Ms. Abreu. For any further questions please do not hesitate to contact me. I have reviewed and agree with the ROS entered by the MA/LPN.          Kimo Perry MD, [facility]  Board Certified in Gastroenterology and [address] [place] [address] Gastroenterology  Office #: [phone]

## 2021-01-27 ENCOUNTER — TELEPHONE (OUTPATIENT)
Dept: FAMILY MEDICINE CLINIC | Age: 71
End: 2021-01-27

## 2021-01-27 NOTE — TELEPHONE ENCOUNTER
Future Appointments   Date Time Provider Clarence Tg   1/29/2021 11:00 AM Craige Lab, APRN - CNP [facility] [address]   2/16/2021  1:00 PM [facility] PAT RM 3 [facility]   2/25/2021 10:00 AM Alayna Dominguez MD fp sc [id number]   2/26/2021  2:00 PM SCHEDULE, [address]   4/26/2021  2:00 PM Simon Miller MD [address] [facility]

## 2021-01-27 NOTE — TELEPHONE ENCOUNTER
Received request for medical clearance from [facility] [place]. She is scheduled for 03/02/21 for EGD/Colon. They are requesting if she can stop Plavix and for how many days as well as if she is cleared? Please advise.      PAT 02/16/21  Next appt with you: 02/25/21  COVID test: 02/26/21

## 2021-01-29 ENCOUNTER — HOSPITAL ENCOUNTER (OUTPATIENT)
Dept: PAIN MANAGEMENT | Age: 71
Discharge: HOME | End: 2021-01-29
Payer: COMMERCIAL

## 2021-01-29 DIAGNOSIS — M47.892 OTHER OSTEOARTHRITIS OF SPINE, CERVICAL REGION: Chronic | ICD-10-CM

## 2021-01-29 DIAGNOSIS — M54.16 LUMBAR RADICULOPATHY, CHRONIC: ICD-10-CM

## 2021-01-29 DIAGNOSIS — Z51.81 MEDICATION MONITORING ENCOUNTER: Chronic | ICD-10-CM

## 2021-01-29 DIAGNOSIS — M50.30 DDD (DEGENERATIVE DISC DISEASE), CERVICAL: Chronic | ICD-10-CM

## 2021-01-29 DIAGNOSIS — M47.817 LUMBOSACRAL SPONDYLOSIS WITHOUT MYELOPATHY: Chronic | ICD-10-CM

## 2021-01-29 DIAGNOSIS — M46.1 SACROILIITIS, NOT ELSEWHERE CLASSIFIED (HCC): Primary | Chronic | ICD-10-CM

## 2021-01-29 DIAGNOSIS — M51.36 DDD (DEGENERATIVE DISC DISEASE), LUMBAR: ICD-10-CM

## 2021-01-29 DIAGNOSIS — F11.90 CHRONIC, CONTINUOUS USE OF OPIOIDS: ICD-10-CM

## 2021-01-29 PROBLEM — Z79.4 LONG TERM (CURRENT) USE OF INSULIN (HCC): Status: ACTIVE | Noted: 2019-11-07

## 2021-01-29 PROBLEM — I48.21 PERMANENT ATRIAL FIBRILLATION (HCC): Status: ACTIVE | Noted: 2019-11-07

## 2021-01-29 PROBLEM — G62.9 POLYNEUROPATHY, UNSPECIFIED: Status: ACTIVE | Noted: 2019-11-07

## 2021-01-29 PROBLEM — F32.9 MAJOR DEPRESSIVE DISORDER, SINGLE EPISODE, UNSPECIFIED: Status: ACTIVE | Noted: 2019-11-07

## 2021-01-29 PROBLEM — G89.29 OTHER CHRONIC PAIN: Status: ACTIVE | Noted: 2019-11-07

## 2021-01-29 PROCEDURE — 99213 OFFICE O/P EST LOW 20 MIN: CPT

## 2021-01-29 PROCEDURE — 99442 PR PHYS/QHP TELEPHONE EVALUATION 11-20 MIN: CPT | Performed by: NURSE PRACTITIONER

## 2021-01-29 RX ORDER — OXYCODONE HYDROCHLORIDE AND ACETAMINOPHEN 5; 325 MG/1; MG/1
1 TABLET ORAL SEE ADMIN INSTRUCTIONS
Qty: 100 TABLET | Refills: 0 | Status: SHIPPED | OUTPATIENT
Start: 2021-01-30 | End: 2021-02-24 | Stop reason: SDUPTHER

## 2021-01-29 ASSESSMENT — ENCOUNTER SYMPTOMS
GASTROINTESTINAL NEGATIVE: 1
BACK PAIN: 1

## 2021-01-29 NOTE — PROGRESS NOTES
Snehal 88 PROGRESS NOTE      Patient  completed []  video visit   [x]   phone call:   18   Minutes :   [x]    to  review Medication Agreement    []  Follow up after procedure   []  Discuss treatment options    Location:  Provider:  working from    [x]    home    []   [facility] - KELLEE VILLANUEVA , patient at home   Chief Complaint:  Back pain    She c/o low back pain radiating down her legs, Her pain is unchanged. She has no history of lumbar surgery, She states not much relief after doing PT in the past, She has sleep apnea and uses bipap, She uses oxygen prn. She does home exercises. No Ed visits. Back Pain  This is a chronic problem. The problem occurs constantly. The problem is unchanged. The pain is present in the lumbar spine. Quality: throbbing. Radiates to: legs. The pain is at a severity of 7/10. The pain is moderate. The pain is the same all the time. The symptoms are aggravated by bending (too much activity). Risk factors include menopause. She has tried heat (sitting with pillow behind back) for the symptoms.          Treatment goals:get pain under control  Functional status:       Aberrancy:   Any alcoholic beverages   no         Any illegal drugs  no       Analgesia:   7                  Adverse  Effects :none      ADL;s :home exercises    Data:    When was thelast UDS: 1-           Was the UDS appropriate: yes      Record/Diagnostics Review:      As above, I did review the imaging         1/9/2020  8:36 PM - [place], [facility] Incoming Lab Results From [facility]    Component Value Ref Range & Units Status Collected Lab   Pain Management Drug Panel Interp, Urine Consistent   Final 01/06/2020 11:40 AM [facility]   (NOTE)   ________________________________________________________________   DRUGS EXPECTED:   PERCOCET (OXYCODONE) [1/6/20]   ________________________________________________________________   CONSISTENT with medications provided:   PERCOCET (OXYCODONE) : based on oxycodone, noroxycodone   ________________________________________________________________   Drugs Not Included in this Assay:   Acetaminophen   ________________________________________________________________   INTERPRETIVE INFORMATION: Pain Mgt [place], [place] Spec/[facility], Ur,                            Interp   Interpretation depends on accuracy and completeness of patient   medication information submitted by client. EXAMINATION:   MRI OF THE LUMBAR SPINE WITHOUT AND WITH CONTRAST  10/23/2019 5:48 pm       TECHNIQUE:   Multiplanar multisequence MRI of the lumbar spine was performed without and   with the administration of intravenous contrast.       COMPARISON:   MRI lumbar spine 01/08/2018       HISTORY:   ORDERING SYSTEM PROVIDED HISTORY:   TECHNOLOGIST PROVIDED HISTORY:   back pain, weakness, epidural injection 5 weeks ago       FINDINGS:   BONES/ALIGNMENT: There is normal alignment of the spine. The vertebral body   heights are maintained. The bone marrow signal appears unremarkable. Multilevel Schmorl's nodes identified involving the lower thoracic and upper   lumbar spine.       SPINAL CORD:  The conus terminates normally.       SOFT TISSUES: No abnormal enhancement is seen of the lumbar spine.  No   paraspinal mass identified.       L1-L2: Minimal degenerative loss of disc space height and signal.  There is   no significant disc protrusion, spinal canal stenosis or neural foraminal   narrowing.       L2-L3: Moderate disc space is identified with mild circumferential disc   bulge.  Minimal central canal stenosis.  Mild facet arthropathy.  No central   foramina.  No focal disc protrusion.       L3-L4: Mild disc space disease identified with circumferential disc bulge. There is mild right and moderate left neural foraminal narrowing.  Moderate   degenerate facet arthropathy.  Mild central canal stenosis.       L4-L5: Mild disc space disease identified with circumferential disc bulge.    There is a right paracentral disc extrusion which extends caudally 1.2 cm and   effaces the right lateral recess affecting the traversing right L5 nerve   root.  Otherwise mild neural foraminal narrowing.  No central canal stenosis. Moderate facet arthropathy.       L5-S1: Minimal degenerative loss of disc space height and signal.  No focal   disc protrusion.  Minimal neural foraminal narrowing.  Mild-to-moderate facet   arthropathy.           Impression   Right paracentral disc extrusion L4-5 narrowing the right lateral recess. Please correlate with possible right L5 radiculopathy.  This is new from   previous MRI.       Otherwise mild degenerate change.       No evidence of discitis osteomyelitis or epidural abscess.               Pill count: appropriate  fill date :1-    Morphine equivalent dose as reported on [place]:25  Periodic Controlled Substance Monitoring: Possible medication side effects, risk of tolerance/dependence & alternative treatments discussed. , Assessed functional status., Obtaining appropriate analgesic effect of treatment., No signs of potential drug abuse or diversion identified. Gino Notice, APRN - CNP)  Review of[place]ARRS does not show any aberrant prescription behavior. Medication is helping the patient stay active. Patient denies any side effects and reports adequate analgesia. No sign of misuse/abuse. Past Medical History:   Diagnosis Date    Allergic rhinitis     Anxiety 7/17/2013    Arthritis     Asthma     Atrial fibrillation (HCC)     Back pain     NERVE/DR. GRACIA    Benign hypertension with CKD (chronic kidney disease) stage III     CAD (coronary artery disease) 3/21/2013    Caffeine use     2 coffee/day    CHF (congestive heart failure) (HCC)     Chronic kidney disease     CKD (chronic kidney disease) stage 3, GFR 30-59 ml/min     COPD (chronic obstructive pulmonary disease) (HCC)     emphysema    Degeneration of lumbar or lumbosacral intervertebral disc     Depression     DM [facility]diabetes mellitus) ([facility] [address]     Emphysema of lung ([facility] [address]     Gastritis     GERD (gastroesophageal reflux disease)     Hearing loss     Hematuria     Hiatal hernia     HTN (hypertension), benign 6/28/2014    Hypertriglyceridemia     Incontinence of urine 9/25/2013    Irritable bowel syndrome with both constipation and diarrhea 1/26/2021    Knee arthropathy     Lumbosacral spondylosis without myelopathy 9/29/2014    Migraine headache     DR. GRACIA    Mumps     Neuropathy     Obesity     On home oxygen therapy     2 L PER NC  HS AND PRN    HIGINIO on CPAP     Otitis media 1-26-15    Secondary diabetes mellitus with stage 3 chronic kidney disease (GFR 30-59) (HCC)     Stroke (HCC)     QUESTIONABLE    Tinnitus     Type 2 diabetes mellitus without complication (HCC)     Type II or unspecified type diabetes mellitus without mention of complication, not stated as uncontrolled     UTI (lower urinary tract infection)     Varicose vein        Past Surgical History:   Procedure Laterality Date    ABLATION OF DYSRHYTHMIC FOCUS  2000    CARPAL TUNNEL RELEASE Right     CATARACT REMOVAL WITH IMPLANT Bilateral     CHOLECYSTECTOMY  2007    COLONOSCOPY      COLONOSCOPY  04/10/2017    tubular adenomo polyp , mod. sigmoid diverticulosis, min. int. hemorrhoids    CYSTOSCOPY  9/25/2013    DILATION AND CURETTAGE  1979    EYE SURGERY      laser    INCONTINENCE SURGERY      Percutaneous nerve stimulation Dr Katie Rice Nov 2013     INSERTABLE CARDIAC MONITOR  12/04/2015    [employer] REVEAL LINQ MODEL #[id number] MRI CONDTIONAL OK 3T.  IMMEDIATELY POST IMPLANT    OTHER SURGICAL HISTORY  09/10/15    removal of interstim    HI COLSC FLX W/REMOVAL LESION BY HOT BX FORCEPS N/A 4/10/2017    COLONOSCOPY POLYPECTOMY HOT BIOPSY performed by Estrella Khalil MD at [address] [facility] [address]      TYMPANOPLASTY      TYMPANOSTOMY TUBE PLACEMENT  08/21/2017    UPPER GASTROINTESTINAL ENDOSCOPY 03/2016    Dr Alex Carrion       Allergies   Allergen Reactions    Robitussin [Guaifenesin] Anaphylaxis    Codeine Swelling    Compazine [Prochlorperazine Maleate] Hives and Swelling    Iodides Itching    Morphine Other (See Comments)     hallucinations    Moxifloxacin      Other reaction(s): Intolerance-unknown  Other reaction(s): Intolerance-unknown    Reglan [Metoclopramide] Nausea Only    Avelox [Moxifloxacin Hcl In Nacl] Rash     Dermatitis      Cipro Xr Rash     dermatitis    Sulfa Antibiotics Rash         Current Outpatient Medications:     BiPAP Machine MISC, repair/replace Bipap maintain 18/9CMH2O, Cflex=3,mask,tubing,filters,headgear,heated humidifier,all supplies PRN, Disp: , Rfl:     Psyllium (METAMUCIL PO), Take by mouth 3 times daily Takes powder, Disp: , Rfl:     ferrous sulfate (IRON 325) 325 (65 Fe) MG tablet, TAKE 1 TAB BY MOUTH EVERY MORNING , Disp: 90 tablet, Rfl: 5    citalopram (CELEXA) 40 MG tablet, TAKE 1 2 TABLET BY MOUTH TWICE A DAY, Disp: 90 tablet, Rfl: 3    carvedilol (COREG) 3.125 MG tablet, TAKE 1 TAB BY MOUTH TWICE A DAY ( IN THE MORNING AND BEDTIME ) , Disp: 180 tablet, Rfl: 3    losartan (COZAAR) 25 MG tablet, TAKE 1 TAB BY MOUTH ONCE A DAY ( IN THE MORNING ), Disp: 90 tablet, Rfl: 5    magnesium oxide (MAG-OX) 400 MG tablet, TAKE 1 TAB BY MOUTH TWICE A DAY ( IN THE MORNING AND BEDTIME ), Disp: 180 tablet, Rfl: 3    oxyCODONE-acetaminophen (PERCOCET) 5-325 MG per tablet, Take 1 tablet by mouth See Admin Instructions for 30 days. Intended supply: 30 days.  1 tab 3-4 times a day prn, Disp: 100 tablet, Rfl: 0    LANTUS SOLOSTAR 100 UNIT/ML injection pen, INJECT 42 UNITS INTO THE SKIN 2 TIMES DAILY , Disp: 15 mL, Rfl: 3    topiramate (TOPAMAX) 100 MG tablet, Take 1 tablet by mouth nightly, Disp: 90 tablet, Rfl: 2    clopidogrel (PLAVIX) 75 MG tablet, TAKE 1 TAB BY MOUTH ONCE A DAY ( IN THE MORNING ) -THIS MEDICINE MAY BE TAKENWITH OR WITHOUT FOOD , Disp: 90 tablet, Rfl: 1    Icosapent Ethyl (VASCEPA) 1 g CAPS capsule, Take 1 capsule by mouth 2 times daily, Disp: 60 capsule, Rfl: 3    metFORMIN (GLUCOPHAGE) 1000 MG tablet, TAKE 1 TAB BY MOUTH TWICE A DAY ( IN THE MORNING AND BEDTIME ) , Disp: 180 tablet, Rfl: 3    gabapentin (NEURONTIN) 300 MG capsule, Take 1 capsule by mouth 3 times daily for 30 days. , Disp: 90 capsule, Rfl: 2    nystatin (MYCOSTATIN) 387978 UNIT/GM powder, Apply 3 times daily. , Disp: 3 Bottle, Rfl: 3    aspirin 81 MG chewable tablet, Take 1 tablet by mouth daily, Disp: 90 tablet, Rfl: 3    pantoprazole (PROTONIX) 40 MG tablet, Take 1 tablet by mouth 2 times daily, Disp: 60 tablet, Rfl: 3    SYMBICORT 160-4.5 MCG/ACT AERO,  2 puffs As needed for sob, Disp: , Rfl:     polyethylene glycol (MIRALAX) 17 GM/SCOOP powder, Use as Directed per instructions provided by physician office. , Disp: 238 g, Rfl: 0    bisacodyl (DULCOLAX) 5 MG EC tablet, Use as directed per instructions provided by physician office, Disp: 4 tablet, Rfl: 0    blood glucose test strips (TRUE METRIX BLOOD GLUCOSE TEST) strip, THREE TIMES A DAY, Disp: 100 each, Rfl: 3    gabapentin (NEURONTIN) 300 MG capsule, Take 1 capsule by mouth 3 times daily for 30 days. , Disp: 90 capsule, Rfl: 2    dicyclomine (BENTYL) 10 MG capsule, Take 1 capsule by mouth 2 times daily as needed (abdominal spasm), Disp: 180 capsule, Rfl: 0    albuterol (PROVENTIL) (2.5 MG/3ML) 0.083% nebulizer solution, Take 3 mLs by nebulization every 6 hours as needed for Wheezing, Disp: 120 each, Rfl: 3    furosemide (LASIX) 20 MG tablet, TAKE 1 TAB BY MOUTH ONCE A DAY AS NEEDED ( WEIGHT GAIN 3 LBS OVERNIGHT ) , Disp: 30 tablet, Rfl: 3    zoster recombinant adjuvanted vaccine (SHINGRIX) 50 MCG/0.5ML SUSR injection, 50 MCG IM then repeat 2-6 months.  (Patient not taking: Reported on 11/25/2020), Disp: 0.5 mL, Rfl: 1    vitamin B-12 (CYANOCOBALAMIN) 100 MCG tablet, Take 50 mcg by mouth daily, Disp: , Rfl:     isosorbide for back pain and joint pain. Gastrointestinal: Negative. Genitourinary: Negative. Neurological:        Uses a walker   Psychiatric/Behavioral: Negative. Physical Exam:  LMP  (LMP Unknown)     Physical Exam  Skin:         Neurological:      Mental Status: She is alert and oriented to person, place, and time. Psychiatric:         Mood and Affect: Mood normal.         Thought Content: Thought content normal.           Assessment:  Problem List Items Addressed This Visit     Sacroiliitis, not elsewhere classified ([facility] [address] - Primary (Chronic)    Osteoarthritis of cervical spine (Chronic)    Medication monitoring encounter (Chronic)    Lumbosacral spondylosis without myelopathy (Chronic)    Lumbar radiculopathy, chronic    DDD (degenerative disc disease), lumbar    Chronic, continuous use of opioids              Treatment Plan:  DISCUSSION: Treatment options discussed withpatient and all questions answered to patient's satisfaction. Possible side effects, risk of tolerance and or dependence and alternative treatments discussed    Obtaining appropriate analgesic effect of treatment   No signs of potential drug abuse or diversion identified    [x] Ill effects of being on chronic pain medications such as sleep disturbances, respiratory depression, hormonal changes, withdrawal symptoms, chronic opioid dependence and tolerance as well as risk of taking opioids with Benzodiazepines and taking opioids along with alcohol,  werediscussed with patient. I had asked the patient to minimize medication use and utilize pain medications only for uncontrolled rest pain or pain with exertional activities. I advised patient not to self-escalate painmedications without consulting with us. At each of patient's future visits we will try to taper pain medications, while adjusting the adjunct medications, and re-evaluating for Physical Therapy to improve spinal andjoint strength.  We will continue to have discussions to decrease pain medications as tolerated. Counseled patient on effects their pain medication and /or their medical condition mayhave on their  ability to drive or operate machinery. Instructed not to drive or operate machinery if drowsy     I also discussed with the patient regarding the dangers of combining narcotic pain medication with tranquilizers, alcohol or illegal drugs or taking the medication any way other than prescribed. The dangers were discussed  including respiratory depression and death. Patient was told to tell  all  physicians regarding the medications he is getting from pain clinic. Patient is warned not to take any unprescribed medications over-the-countermedications that can depress breathing . Patient is advised to talk to the pharmacist or physicians if planning to take any over-the-counter medications before  takeing them. Patient is strongly advised to avoid tranquilizers or  relaxants, illegal drugs  or any medications that can depress breathing  Patient is also advised to tell us if there is any changes in their medications from other physicians.             TREATMENT OPTIONS:       Medication Agreement Requirements Met  Continue Opioid therapy  Script written for  percocet  Follow up appointment made

## 2021-02-04 DIAGNOSIS — E11.42 TYPE 2 DIABETES MELLITUS WITH DIABETIC POLYNEUROPATHY, WITH LONG-TERM CURRENT USE OF INSULIN (HCC): Primary | Chronic | ICD-10-CM

## 2021-02-04 DIAGNOSIS — Z79.4 TYPE 2 DIABETES MELLITUS WITH DIABETIC POLYNEUROPATHY, WITH LONG-TERM CURRENT USE OF INSULIN (HCC): Primary | Chronic | ICD-10-CM

## 2021-02-04 RX ORDER — GLUCOSAMINE HCL/CHONDROITIN SU 500-400 MG
CAPSULE ORAL
Qty: 100 STRIP | Refills: 0 | Status: SHIPPED | OUTPATIENT
Start: 2021-02-04 | End: 2021-04-21 | Stop reason: SDUPTHER

## 2021-02-04 NOTE — TELEPHONE ENCOUNTER
Can you please print a new script for patients testing strips. [facility] called and they need a new script.    Fax to [phone] Attn: Magdalene

## 2021-02-23 ENCOUNTER — TELEPHONE (OUTPATIENT)
Dept: FAMILY MEDICINE CLINIC | Age: 71
End: 2021-02-23

## 2021-02-24 ENCOUNTER — HOSPITAL ENCOUNTER (OUTPATIENT)
Dept: PAIN MANAGEMENT | Age: 71
Discharge: HOME | End: 2021-02-24
Payer: COMMERCIAL

## 2021-02-24 ENCOUNTER — TELEPHONE (OUTPATIENT)
Dept: GASTROENTEROLOGY | Age: 71
End: 2021-02-24

## 2021-02-24 DIAGNOSIS — M46.1 SACROILIITIS, NOT ELSEWHERE CLASSIFIED (HCC): Chronic | ICD-10-CM

## 2021-02-24 DIAGNOSIS — F11.90 CHRONIC, CONTINUOUS USE OF OPIOIDS: ICD-10-CM

## 2021-02-24 DIAGNOSIS — M47.892 OTHER OSTEOARTHRITIS OF SPINE, CERVICAL REGION: Chronic | ICD-10-CM

## 2021-02-24 DIAGNOSIS — Z51.81 MEDICATION MONITORING ENCOUNTER: Chronic | ICD-10-CM

## 2021-02-24 DIAGNOSIS — M50.30 DDD (DEGENERATIVE DISC DISEASE), CERVICAL: Chronic | ICD-10-CM

## 2021-02-24 DIAGNOSIS — M47.817 LUMBOSACRAL SPONDYLOSIS WITHOUT MYELOPATHY: Chronic | ICD-10-CM

## 2021-02-24 DIAGNOSIS — G62.9 NEUROPATHY: ICD-10-CM

## 2021-02-24 DIAGNOSIS — M54.16 LUMBAR RADICULOPATHY, CHRONIC: ICD-10-CM

## 2021-02-24 DIAGNOSIS — M51.36 DDD (DEGENERATIVE DISC DISEASE), LUMBAR: ICD-10-CM

## 2021-02-24 DIAGNOSIS — M47.9 SPONDYLARTHROSIS: Primary | ICD-10-CM

## 2021-02-24 DIAGNOSIS — G62.9 POLYNEUROPATHY, UNSPECIFIED: ICD-10-CM

## 2021-02-24 PROCEDURE — 99442 PR PHYS/QHP TELEPHONE EVALUATION 11-20 MIN: CPT | Performed by: NURSE PRACTITIONER

## 2021-02-24 PROCEDURE — 99213 OFFICE O/P EST LOW 20 MIN: CPT

## 2021-02-24 RX ORDER — OXYCODONE HYDROCHLORIDE AND ACETAMINOPHEN 5; 325 MG/1; MG/1
1 TABLET ORAL SEE ADMIN INSTRUCTIONS
Qty: 100 TABLET | Refills: 0 | Status: SHIPPED | OUTPATIENT
Start: 2021-03-01 | End: 2021-03-29 | Stop reason: SDUPTHER

## 2021-02-24 ASSESSMENT — ENCOUNTER SYMPTOMS
EYES NEGATIVE: 1
GASTROINTESTINAL NEGATIVE: 1
BACK PAIN: 1

## 2021-02-24 NOTE — PROGRESS NOTES
Snehal 88 PROGRESS NOTE      Patient  completed []  video visit   [x]   phone call:  13    Minutes :       [x]    to  review Medication Agreement    []  Follow up after procedure   []  Discuss treatment options      Location:  Provider:  working from    [x]    home    []   [facility] - KELLEE VILLANUEVA ,   patient at home       Chief Complaint:    She c/o low back pain radiating down her legs, She has no history lumbar surgery, She has done pT in the past which helped some. She sleeps fairly well, she uses bi-pap. She does home exercises. Her blood sugar was 98 this am.She states will be having  covid vaccine, had first one. She states is scheduled for colonosocopy next week. No Ed visits. Back Pain  This is a chronic problem. The problem occurs intermittently. The problem is unchanged. Quality: dull. Radiates to: down legs. The pain is at a severity of 7/10. The pain is moderate. The pain is the same all the time. Exacerbated by: sitting too long. Associated symptoms include headaches. She has tried analgesics and home exercises for the symptoms.            Treatment goals:  Functional status: reduce pain 5      Aberrancy:   Any alcoholic beverages  no          Any illegal drugs   no    Analgesia:        7             Adverse  Effects :no      ADL;s :home exercises      Data:    When was thelast UDS:    1-6-2020        Was the UDS appropriate:yes      Record/Diagnostics Review:      As above, I did review the imaging     1/9/2020  8:36 PM - [place], [facility] Incoming Lab Results From [place]    Component Value Ref Range & Units Status Collected Lab   Pain Management Drug Panel Interp, Urine Consistent   Final 01/06/2020 11:40 AM [facility]   (NOTE)   ________________________________________________________________   DRUGS EXPECTED:   PERCOCET (OXYCODONE) [1/6/20]   ________________________________________________________________   CONSISTENT with medications provided:   PERCOCET (OXYCODONE) : based on oxycodone, noroxycodone   ________________________________________________________________   Drugs Not Included in this Assay:   Acetaminophen   ________________________________________________________________   INTERPRETIVE INFORMATION: [facility] [place], [place]      EXAMINATION:   MRI OF THE LUMBAR SPINE WITHOUT AND WITH CONTRAST  10/23/2019 5:48 pm       TECHNIQUE:   Multiplanar multisequence MRI of the lumbar spine was performed without and   with the administration of intravenous contrast.       COMPARISON:   MRI lumbar spine 01/08/2018       HISTORY:   ORDERING SYSTEM PROVIDED HISTORY:   TECHNOLOGIST PROVIDED HISTORY:   back pain, weakness, epidural injection 5 weeks ago       FINDINGS:   BONES/ALIGNMENT: There is normal alignment of the spine. The vertebral body   heights are maintained. The bone marrow signal appears unremarkable. Multilevel Schmorl's nodes identified involving the lower thoracic and upper   lumbar spine.       SPINAL CORD:  The conus terminates normally.       SOFT TISSUES: No abnormal enhancement is seen of the lumbar spine.  No   paraspinal mass identified.       L1-L2: Minimal degenerative loss of disc space height and signal.  There is   no significant disc protrusion, spinal canal stenosis or neural foraminal   narrowing.       L2-L3: Moderate disc space is identified with mild circumferential disc   bulge.  Minimal central canal stenosis.  Mild facet arthropathy.  No central   foramina.  No focal disc protrusion.       L3-L4: Mild disc space disease identified with circumferential disc bulge. There is mild right and moderate left neural foraminal narrowing.  Moderate   degenerate facet arthropathy.  Mild central canal stenosis.       L4-L5: Mild disc space disease identified with circumferential disc bulge.    There is a right paracentral disc extrusion which extends caudally 1.2 cm and   effaces the right lateral recess affecting the traversing right L5 nerve   root.  Otherwise mild neural foraminal narrowing.  No central canal stenosis. Moderate facet arthropathy.       L5-S1: Minimal degenerative loss of disc space height and signal.  No focal   disc protrusion.  Minimal neural foraminal narrowing.  Mild-to-moderate facet   arthropathy.           Impression   Right paracentral disc extrusion L4-5 narrowing the right lateral recess. Please correlate with possible right L5 radiculopathy.  This is new from   previous MRI.       Otherwise mild degenerate change.       No evidence of discitis osteomyelitis or epidural abscess.           Order History                      Pill count: appropriate  fill date :3-1-2021    25Morphine equivalent dose as reported on [place]:  Periodic Controlled Substance Monitoring: Possible medication side effects, risk of tolerance/dependence & alternative treatments discussed., No signs of potential drug abuse or diversion identified. , Assessed functional status., Obtaining appropriate analgesic effect of treatment. Toshia Huynh, APRN - CNP)  Review of[place]ARRS does not show any aberrant prescription behavior. Medication is helping the patient stay active. Patient denies any side effects and reports adequate analgesia. No sign of misuse/abuse. Past Medical History:   Diagnosis Date    Allergic rhinitis     Anxiety 7/17/2013    Arthritis     Asthma     Atrial fibrillation (HCC)     Back pain     NERVE/DR. GRACIA    Benign hypertension with CKD (chronic kidney disease) stage III     CAD (coronary artery disease) 3/21/2013    Caffeine use     2 coffee/day    CHF (congestive heart failure) (HCC)     Chronic kidney disease     CKD (chronic kidney disease) stage 3, GFR 30-59 ml/min     COPD (chronic obstructive pulmonary disease) (HCC)     emphysema    Degeneration of lumbar or lumbosacral intervertebral disc     Depression     DM (diabetes mellitus) (HCC)     Emphysema of lung (HCC)     Gastritis     GERD (gastroesophageal reflux disease)     Hearing loss     Hematuria     Hiatal hernia     HTN (hypertension), benign 6/28/2014    Hypertriglyceridemia     Incontinence of urine 9/25/2013    Irritable bowel syndrome with both constipation and diarrhea 1/26/2021    Knee arthropathy     Lumbosacral spondylosis without myelopathy 9/29/2014    Migraine headache     DR. GRACIA    Mumps     Neuropathy     Obesity     On home oxygen therapy     2 L PER NC  HS AND PRN    HIGINIO on CPAP     Otitis media 1-26-15    Secondary diabetes mellitus with stage 3 chronic kidney disease (GFR 30-59) ([facility])     Stroke ([facility] [address]      mini stroke.  Tinnitus     Type 2 diabetes mellitus without complication ([facility])     Type II or unspecified type diabetes mellitus without mention of complication, not stated as uncontrolled     UTI (lower urinary tract infection)     Varicose vein        Past Surgical History:   Procedure Laterality Date    ABLATION OF DYSRHYTHMIC FOCUS  2000    CARPAL TUNNEL RELEASE Right     CATARACT REMOVAL WITH IMPLANT Bilateral     CHOLECYSTECTOMY  2007    COLONOSCOPY      COLONOSCOPY  04/10/2017    tubular adenomo polyp , mod. sigmoid diverticulosis, min. int. hemorrhoids    CYSTOSCOPY  9/25/2013    DILATION AND CURETTAGE  1979    EYE SURGERY      laser    INCONTINENCE SURGERY      Percutaneous nerve stimulation Dr Rasheeda Howe Nov 2013     INSERTABLE CARDIAC MONITOR  12/04/2015    [employer] REVEAL LINQ MODEL #[id number] MRI CONDTIONAL OK 3T.  IMMEDIATELY POST IMPLANT    OTHER SURGICAL HISTORY  09/10/15    removal of interstim    SC COLSC FLX W/REMOVAL LESION BY HOT BX FORCEPS N/A 4/10/2017    COLONOSCOPY POLYPECTOMY HOT BIOPSY performed by Palma Jamison MD at [address] [place] [address]      TYMPANOPLASTY      TYMPANOPLASTY      TYMPANOSTOMY TUBE PLACEMENT  08/21/2017    UPPER GASTROINTESTINAL ENDOSCOPY  03/2016    Dr Demi Deras       Allergies   Allergen Reactions    Robitussin [Guaifenesin] Anaphylaxis    Codeine Swelling  Compazine [Prochlorperazine Maleate] Hives and Swelling    Iodides Itching    Morphine Other (See Comments)     hallucinations    Moxifloxacin      Other reaction(s): Intolerance-unknown  Other reaction(s): Intolerance-unknown    Reglan [Metoclopramide] Nausea Only    Avelox [Moxifloxacin Hcl In Nacl] Rash     Dermatitis      Cipro Xr Rash     dermatitis    Sulfa Antibiotics Rash         Current Outpatient Medications:     Multiple Vitamins-Minerals (THERAPEUTIC MULTIVITAMIN-MINERALS) tablet, Take 1 tablet by mouth daily Takes Women over 50 Complete Vitamin daily ([employer] brand), Disp: , Rfl:     Psyllium (METAMUCIL PO), Take by mouth 3 times daily Takes powder, Disp: , Rfl:     oxyCODONE-acetaminophen (PERCOCET) 5-325 MG per tablet, Take 1 tablet by mouth See Admin Instructions for 30 days. Intended supply: 30 days.  1 tab 3-4 times a day prn, Disp: 100 tablet, Rfl: 0    ferrous sulfate (IRON 325) 325 (65 Fe) MG tablet, TAKE 1 TAB BY MOUTH EVERY MORNING , Disp: 90 tablet, Rfl: 5    citalopram (CELEXA) 40 MG tablet, TAKE 1 2 TABLET BY MOUTH TWICE A DAY (Patient taking differently: Take 40 mg by mouth 2 times daily TAKE 1/ 2 TABLET BY MOUTH TWICE A DAY), Disp: 90 tablet, Rfl: 3    losartan (COZAAR) 25 MG tablet, TAKE 1 TAB BY MOUTH ONCE A DAY ( IN THE MORNING ), Disp: 90 tablet, Rfl: 5    magnesium oxide (MAG-OX) 400 MG tablet, TAKE 1 TAB BY MOUTH TWICE A DAY ( IN THE MORNING AND BEDTIME ), Disp: 180 tablet, Rfl: 3    LANTUS SOLOSTAR 100 UNIT/ML injection pen, INJECT 42 UNITS INTO THE SKIN 2 TIMES DAILY , Disp: 15 mL, Rfl: 3    topiramate (TOPAMAX) 100 MG tablet, Take 1 tablet by mouth nightly, Disp: 90 tablet, Rfl: 2    clopidogrel (PLAVIX) 75 MG tablet, TAKE 1 TAB BY MOUTH ONCE A DAY ( IN THE MORNING ) -THIS MEDICINE MAY BE TAKENWITH OR WITHOUT FOOD , Disp: 90 tablet, Rfl: 1    Icosapent Ethyl (VASCEPA) 1 g CAPS capsule, Take 1 capsule by mouth 2 times daily, Disp: 60 capsule, Rfl: 3   vitamin B-12 (CYANOCOBALAMIN) 100 MCG tablet, Take 50 mcg by mouth daily, Disp: , Rfl:     isosorbide dinitrate (ISORDIL) 30 MG tablet, Take 30 mg by mouth, Disp: , Rfl:     metFORMIN (GLUCOPHAGE) 1000 MG tablet, TAKE 1 TAB BY MOUTH TWICE A DAY ( IN THE MORNING AND BEDTIME ) , Disp: 180 tablet, Rfl: 3    gabapentin (NEURONTIN) 300 MG capsule, Take 1 capsule by mouth 3 times daily for 30 days. , Disp: 90 capsule, Rfl: 2    nystatin (MYCOSTATIN) 346802 UNIT/GM powder, Apply 3 times daily. , Disp: 3 Bottle, Rfl: 3    aspirin 81 MG chewable tablet, Take 1 tablet by mouth daily, Disp: 90 tablet, Rfl: 3    pantoprazole (PROTONIX) 40 MG tablet, Take 1 tablet by mouth 2 times daily, Disp: 60 tablet, Rfl: 3    SYMBICORT 160-4.5 MCG/ACT AERO,  2 puffs As needed for sob, Disp: , Rfl:     blood glucose monitor strips, Test 2-3 times a day & as needed for symptoms of irregular blood glucose. Please fill for freestyle test strips, Disp: 100 strip, Rfl: 0    BiPAP Machine MISC, repair/replace Bipap maintain 18/9CMH2O, Cflex=3,mask,tubing,filters,headgear,heated humidifier,all supplies PRN, Disp: , Rfl:     polyethylene glycol (MIRALAX) 17 GM/SCOOP powder, Use as Directed per instructions provided by physician office. , Disp: 238 g, Rfl: 0    bisacodyl (DULCOLAX) 5 MG EC tablet, Use as directed per instructions provided by physician office, Disp: 4 tablet, Rfl: 0    blood glucose test strips (TRUE METRIX BLOOD GLUCOSE TEST) strip, THREE TIMES A DAY, Disp: 100 each, Rfl: 3    carvedilol (COREG) 3.125 MG tablet, TAKE 1 TAB BY MOUTH TWICE A DAY ( IN THE MORNING AND BEDTIME ) , Disp: 180 tablet, Rfl: 3    dicyclomine (BENTYL) 10 MG capsule, Take 1 capsule by mouth 2 times daily as needed (abdominal spasm), Disp: 180 capsule, Rfl: 0    albuterol (PROVENTIL) (2.5 MG/3ML) 0.083% nebulizer solution, Take 3 mLs by nebulization every 6 hours as needed for Wheezing, Disp: 120 each, Rfl: 3    furosemide (LASIX) 20 MG tablet, TAKE History    Marital status: Legally      Spouse name: Not on file    Number of children: Not on file    Years of education: Not on file    Highest education level: Not on file   Occupational History    Occupation: disabled     Employer: N/A   Social Needs    Financial resource strain: Not on file    Food insecurity     Worry: Not on file     Inability: Not on file   [employer] needs     Medical: Not on file     Non-medical: Not on file   Tobacco Use    Smoking status: Former Smoker     Packs/day: 3.00     Years: 41.00     Pack years: 123.00     Types: Cigarettes     Quit date: 2000     Years since quittin.1    Smokeless tobacco: Never Used    Tobacco comment: quit in    Substance and Sexual Activity    Alcohol use: No     Alcohol/week: 0.0 standard drinks     Frequency: Never    Drug use: No    Sexual activity: Not Currently   Lifestyle    Physical activity     Days per week: Not on file     Minutes per session: Not on file    Stress: Not on file   Relationships    Social connections     Talks on phone: Not on file     Gets together: Not on file     Attends Restoration service: Not on file     Active member of club or organization: Not on file     Attends meetings of clubs or organizations: Not on file     Relationship status: Not on file    Intimate partner violence     Fear of current or ex partner: Not on file     Emotionally abused: Not on file     Physically abused: Not on file     Forced sexual activity: Not on file   Other Topics Concern    Not on file   Social History Narrative    Not on file         Review of Systems:  Review of Systems   Constitution: Negative. HENT: Negative. Eyes: Negative. Cardiovascular: Negative. Respiratory:        Bibap at night   Endocrine:        Blood sugar 98   Hematologic/Lymphatic: Bruises/bleeds easily. Skin: Negative. Musculoskeletal: Positive for back pain and joint pain. Gastrointestinal: Negative. Genitourinary: Negative. Neurological: Positive for headaches. Psychiatric/Behavioral: Negative. Physical Exam:  LMP  (LMP Unknown)     Physical Exam  Skin:         Neurological:      Mental Status: She is alert and oriented to person, place, and time. Psychiatric:         Mood and Affect: Mood normal.         Thought Content: Thought content normal.           Assessment:    Problem List Items Addressed This Visit     Spondylarthrosis - Primary    Polyneuropathy, unspecified    Osteoarthritis of cervical spine (Chronic)    Neuropathy    Medication monitoring encounter (Chronic)    Relevant Orders    DRUG SCREEN, PAIN    Lumbosacral spondylosis without myelopathy (Chronic)    Lumbar radiculopathy, chronic    DDD (degenerative disc disease), lumbar    Relevant Orders    DRUG SCREEN, PAIN    Chronic, continuous use of opioids    Relevant Orders    DRUG SCREEN, PAIN              Treatment Plan:  DISCUSSION: Treatment options discussed withpatient and all questions answered to patient's satisfaction. Possible side effects, risk of tolerance and or dependence and alternative treatments discussed    Obtaining appropriate analgesic effect of treatment   No signs of potential drug abuse or diversion identified    [x] Ill effects of being on chronic pain medications such as sleep disturbances, respiratory depression, hormonal changes, withdrawal symptoms, chronic opioid dependence and tolerance as well as risk of taking opioids with Benzodiazepines and taking opioids along with alcohol,  werediscussed with patient. I had asked the patient to minimize medication use and utilize pain medications only for uncontrolled rest pain or pain with exertional activities. I advised patient not to self-escalate painmedications without consulting with us.   At each of patient's future visits we will try to taper pain medications, while adjusting the adjunct medications, and re-evaluating for Physical Therapy to improve

## 2021-02-24 NOTE — TELEPHONE ENCOUNTER
Called back to Dr. Darryl Diehl office to clarify the message that was left yesterday, spoke to Zelda Urbano has an appointment scheduled tomorrow so they will address at that time.

## 2021-02-24 NOTE — TELEPHONE ENCOUNTER
Spoke to Varghese Hunt in Dr Loistine Cogan office to check the status of the Clearance request that was hand delivered to the office on 1/26/21.    Left message with Pre-Service

## 2021-02-25 ENCOUNTER — HOSPITAL ENCOUNTER (OUTPATIENT)
Age: 71
Setting detail: SPECIMEN
Discharge: HOME | End: 2021-02-25
Payer: COMMERCIAL

## 2021-02-25 ENCOUNTER — OFFICE VISIT (OUTPATIENT)
Dept: FAMILY MEDICINE CLINIC | Age: 71
End: 2021-02-25
Payer: COMMERCIAL

## 2021-02-25 VITALS
DIASTOLIC BLOOD PRESSURE: 74 MMHG | BODY MASS INDEX: 47.59 KG/M2 | HEIGHT: 62 IN | HEART RATE: 77 BPM | RESPIRATION RATE: 14 BRPM | WEIGHT: 258.6 LBS | OXYGEN SATURATION: 98 % | SYSTOLIC BLOOD PRESSURE: 122 MMHG

## 2021-02-25 DIAGNOSIS — I48.21 PERMANENT ATRIAL FIBRILLATION (HCC): ICD-10-CM

## 2021-02-25 DIAGNOSIS — E78.2 MIXED HYPERLIPIDEMIA: Chronic | ICD-10-CM

## 2021-02-25 DIAGNOSIS — I10 HTN (HYPERTENSION), BENIGN: Chronic | ICD-10-CM

## 2021-02-25 DIAGNOSIS — E11.3293 TYPE 2 DIABETES MELLITUS WITH BOTH EYES AFFECTED BY MILD NONPROLIFERATIVE RETINOPATHY WITHOUT MACULAR EDEMA, WITH LONG-TERM CURRENT USE OF INSULIN (HCC): Primary | ICD-10-CM

## 2021-02-25 DIAGNOSIS — I25.83 CORONARY ARTERY DISEASE DUE TO LIPID RICH PLAQUE: ICD-10-CM

## 2021-02-25 DIAGNOSIS — Z79.4 TYPE 2 DIABETES MELLITUS WITH BOTH EYES AFFECTED BY MILD NONPROLIFERATIVE RETINOPATHY WITHOUT MACULAR EDEMA, WITH LONG-TERM CURRENT USE OF INSULIN (HCC): Primary | ICD-10-CM

## 2021-02-25 DIAGNOSIS — G43.709 CHRONIC MIGRAINE WITHOUT AURA WITHOUT STATUS MIGRAINOSUS, NOT INTRACTABLE: ICD-10-CM

## 2021-02-25 DIAGNOSIS — J44.9 ASTHMA WITH COPD (HCC): ICD-10-CM

## 2021-02-25 DIAGNOSIS — I25.10 CORONARY ARTERY DISEASE DUE TO LIPID RICH PLAQUE: ICD-10-CM

## 2021-02-25 DIAGNOSIS — I50.32 CHRONIC DIASTOLIC CONGESTIVE HEART FAILURE (HCC): ICD-10-CM

## 2021-02-25 PROBLEM — J96.22 ACUTE ON CHRONIC RESPIRATORY FAILURE WITH HYPOXIA AND HYPERCAPNIA (HCC): Status: RESOLVED | Noted: 2018-02-06 | Resolved: 2021-02-25

## 2021-02-25 PROBLEM — R10.9 ABDOMINAL SPASMS: Status: RESOLVED | Noted: 2020-06-25 | Resolved: 2021-02-25

## 2021-02-25 PROBLEM — J96.21 ACUTE ON CHRONIC RESPIRATORY FAILURE WITH HYPOXIA AND HYPERCAPNIA (HCC): Status: RESOLVED | Noted: 2018-02-06 | Resolved: 2021-02-25

## 2021-02-25 PROBLEM — R06.03 ACUTE RESPIRATORY DISTRESS: Status: RESOLVED | Noted: 2020-11-01 | Resolved: 2021-02-25

## 2021-02-25 PROBLEM — R73.9 HYPERGLYCEMIA: Status: RESOLVED | Noted: 2020-10-30 | Resolved: 2021-02-25

## 2021-02-25 PROBLEM — R07.9 CHEST PAIN AT REST: Status: RESOLVED | Noted: 2020-07-01 | Resolved: 2021-02-25

## 2021-02-25 LAB
CHOLESTEROL/HDL RATIO: 8.2
CHOLESTEROL: 302 MG/DL
HBA1C MFR BLD: 7.8 %
HDLC SERPL-MCNC: 37 MG/DL
LDL CHOLESTEROL: 207 MG/DL (ref 0–130)
TRIGL SERPL-MCNC: 289 MG/DL
VLDLC SERPL CALC-MCNC: ABNORMAL MG/DL (ref 1–30)

## 2021-02-25 PROCEDURE — 36415 COLL VENOUS BLD VENIPUNCTURE: CPT

## 2021-02-25 PROCEDURE — 80061 LIPID PANEL: CPT

## 2021-02-25 PROCEDURE — G8427 DOCREV CUR MEDS BY ELIG CLIN: HCPCS | Performed by: FAMILY MEDICINE

## 2021-02-25 PROCEDURE — 3017F COLORECTAL CA SCREEN DOC REV: CPT | Performed by: FAMILY MEDICINE

## 2021-02-25 PROCEDURE — G8417 CALC BMI ABV UP PARAM F/U: HCPCS | Performed by: FAMILY MEDICINE

## 2021-02-25 PROCEDURE — 3023F SPIROM DOC REV: CPT | Performed by: FAMILY MEDICINE

## 2021-02-25 PROCEDURE — G8484 FLU IMMUNIZE NO ADMIN: HCPCS | Performed by: FAMILY MEDICINE

## 2021-02-25 PROCEDURE — 2022F DILAT RTA XM EVC RTNOPTHY: CPT | Performed by: FAMILY MEDICINE

## 2021-02-25 PROCEDURE — 83036 HEMOGLOBIN GLYCOSYLATED A1C: CPT | Performed by: FAMILY MEDICINE

## 2021-02-25 PROCEDURE — G8926 SPIRO NO PERF OR DOC: HCPCS | Performed by: FAMILY MEDICINE

## 2021-02-25 PROCEDURE — G8399 PT W/DXA RESULTS DOCUMENT: HCPCS | Performed by: FAMILY MEDICINE

## 2021-02-25 PROCEDURE — 1090F PRES/ABSN URINE INCON ASSESS: CPT | Performed by: FAMILY MEDICINE

## 2021-02-25 PROCEDURE — 4040F PNEUMOC VAC/ADMIN/RCVD: CPT | Performed by: FAMILY MEDICINE

## 2021-02-25 PROCEDURE — 1036F TOBACCO NON-USER: CPT | Performed by: FAMILY MEDICINE

## 2021-02-25 PROCEDURE — 3051F HG A1C>EQUAL 7.0%<8.0%: CPT | Performed by: FAMILY MEDICINE

## 2021-02-25 PROCEDURE — 99214 OFFICE O/P EST MOD 30 MIN: CPT | Performed by: FAMILY MEDICINE

## 2021-02-25 PROCEDURE — 1123F ACP DISCUSS/DSCN MKR DOCD: CPT | Performed by: FAMILY MEDICINE

## 2021-02-25 ASSESSMENT — ENCOUNTER SYMPTOMS
CONSTIPATION: 0
SHORTNESS OF BREATH: 0
COLOR CHANGE: 0
CHEST TIGHTNESS: 0
BACK PAIN: 1
COUGH: 0
WHEEZING: 0
ABDOMINAL DISTENTION: 0
ABDOMINAL PAIN: 0
DIARRHEA: 0
PHOTOPHOBIA: 0
EYE REDNESS: 0
BLOOD IN STOOL: 0
NAUSEA: 0
VOMITING: 0

## 2021-02-25 ASSESSMENT — PATIENT HEALTH QUESTIONNAIRE - PHQ9
1. LITTLE INTEREST OR PLEASURE IN DOING THINGS: 0
SUM OF ALL RESPONSES TO PHQ QUESTIONS 1-9: 0
SUM OF ALL RESPONSES TO PHQ QUESTIONS 1-9: 0

## 2021-02-25 NOTE — PROGRESS NOTES
Chief Complaint   Patient presents with    Diabetes     3 month follow up     Pre-op Exam     clearance for colonoscopy/EGD    Health Maintenance     pt would like to recieve tdap and shingles after second dose of covid vaccine 3/11/21         Kristin Peers  here today for follow up on chronic medical problems, go over labs and/or diagnostic studies, and medication refills. Diabetes (3 month follow up ), Pre-op Exam (clearance for colonoscopy/EGD), and Health Maintenance (pt would like to recieve tdap and shingles after second dose of covid vaccine 3/11/21)      HPI: Patient is here for follow-up on diabetes hypertension. Diabetes controlled A1c has improved to 7.8, patient is on 50 units twice daily and also on short-acting insulin. Patient reports her sugars are improved to 1 20-1 40. The highest is 160. Patient denies any hypoglycemic episodes. She monitors her sugars daily. Hypertension controlled denies any chest pain shortness of breath. Hyperlipidemia uncontrolled, needs repeat lipid panel. Patient is scheduled for EGD and colonoscopy, needs clearance to stop anticoagulants. Patient is on Plavix and aspirin. Discussed stop Plavix 7 days before and continue aspirin. She can stop aspirin 1 day before due to history of A. Fib. Chronic pain due to lumbar radiculopathy follows with pain management. Patient is on multiple medications no reported medication also. Patient has seen ophthalmologist results are in care everywhere. Patient is also seen cardiologist reports he ordered CT chest which is scheduled in 2 weeks. /74   Pulse 77   Resp 14   Ht 5' 2\" (1.575 m)   Wt 258 lb 9.6 oz (117.3 kg)   LMP  (LMP Unknown)   SpO2 98%   Breastfeeding No   BMI 47.30 kg/m²    Body mass index is 47.3 kg/m².   Wt Readings from Last 3 Encounters:   02/25/21 258 lb 9.6 oz (117.3 kg)   02/16/21 250 lb (113.4 kg)   01/26/21 257 lb 12.8 oz (116.9 kg)        []Negative depression screening. PHQ Scores 2/25/2021 6/25/2020 9/24/2019 3/7/2019 2/14/2018 7/15/2015   PHQ2 Score 0 1 1 2 0 0   PHQ9 Score 0 1 1 2 0 0      []1-4 = Minimal depression   []5-9 = Milddepression   []10-14 = Moderate depression   []15-19 = Moderately severe depression   []20-27 = Severe depression    Discussed testing with the patient and all questions fully answered. No results displayed because visit has over 200 results.             Most recent labs reviewed:     Lab Results   Component Value Date    WBC 7.8 10/30/2020    HGB 13.6 10/30/2020    HCT 42.6 10/30/2020    MCV 95.9 10/30/2020     10/30/2020       @BRIEFLAB(NA,K,CL,CO2,BUN,CREATININE,GLUCOSE,CALCIUM)@     Lab Results   Component Value Date    ALT 29 10/30/2020    AST 21 10/30/2020    ALKPHOS 62 10/30/2020    BILITOT 0.20 (L) 10/30/2020       Lab Results   Component Value Date    TSH 2.14 06/30/2020       Lab Results   Component Value Date    CHOL 264 (H) 07/02/2020    CHOL 193 11/01/2019    CHOL 385 (H) 09/25/2018     Lab Results   Component Value Date    TRIG 340 (H) 07/02/2020    TRIG 291 (H) 11/01/2019    TRIG 269 (H) 09/25/2018     Lab Results   Component Value Date    HDL 34 (L) 07/02/2020    HDL 37 (L) 11/01/2019    HDL 42 09/25/2018     Lab Results   Component Value Date    LDLCHOLESTEROL 162 (H) 07/02/2020    LDLCHOLESTEROL 98 11/01/2019    LDLCHOLESTEROL 289 (H) 09/25/2018     Lab Results   Component Value Date    VLDL NOT REPORTED (H) 07/02/2020    VLDL NOT REPORTED (H) 11/01/2019    VLDL NOT REPORTED 09/25/2018     Lab Results   Component Value Date    CHOLHDLRATIO 7.8 (H) 07/02/2020    CHOLHDLRATIO 5.2 (H) 11/01/2019    CHOLHDLRATIO 9.2 (H) 09/25/2018       Lab Results   Component Value Date    LABA1C 7.8 02/25/2021       Lab Results   Component Value Date    XFOHWFSF61 389 10/19/2017       Lab Results   Component Value Date    FOLATE 8.3 10/19/2017       Lab Results   Component Value Date    IRON 47 (L) 02/23/2012    TIBC 377 TIMES DAILY  15 mL 3    topiramate (TOPAMAX) 100 MG tablet Take 1 tablet by mouth nightly 90 tablet 2    dicyclomine (BENTYL) 10 MG capsule Take 1 capsule by mouth 2 times daily as needed (abdominal spasm) 180 capsule 0    albuterol (PROVENTIL) (2.5 MG/3ML) 0.083% nebulizer solution Take 3 mLs by nebulization every 6 hours as needed for Wheezing 120 each 3    furosemide (LASIX) 20 MG tablet TAKE 1 TAB BY MOUTH ONCE A DAY AS NEEDED ( WEIGHT GAIN 3 LBS OVERNIGHT )  30 tablet 3    clopidogrel (PLAVIX) 75 MG tablet TAKE 1 TAB BY MOUTH ONCE A DAY ( IN THE MORNING ) -THIS MEDICINE MAY BE TAKENWITH OR WITHOUT FOOD  90 tablet 1    zoster recombinant adjuvanted vaccine (SHINGRIX) 50 MCG/0.5ML SUSR injection 50 MCG IM then repeat 2-6 months. 0.5 mL 1    Icosapent Ethyl (VASCEPA) 1 g CAPS capsule Take 1 capsule by mouth 2 times daily 60 capsule 3    vitamin B-12 (CYANOCOBALAMIN) 100 MCG tablet Take 50 mcg by mouth daily      isosorbide dinitrate (ISORDIL) 30 MG tablet Take 30 mg by mouth      ULTICARE MINI PEN NEEDLES 31G X 6 MM MISC USE AS DIRECTED  100 each 5    metFORMIN (GLUCOPHAGE) 1000 MG tablet TAKE 1 TAB BY MOUTH TWICE A DAY ( IN THE MORNING AND BEDTIME )  180 tablet 3    nystatin (MYCOSTATIN) 631367 UNIT/GM powder Apply 3 times daily. 3 Bottle 3    insulin aspart (NOVOLOG FLEXPEN) 100 UNIT/ML injection pen If <139 - No Insulin; 140-199-2 Units;200-249-4 Units;250-299-6 Units;300-349-8 Units;350-400=10 Units; Above 400-12 Units 5 pen 3    aspirin 81 MG chewable tablet Take 1 tablet by mouth daily 90 tablet 3    lidocaine (LMX) 4 % cream Apply topically every 8 hrs as needed for pain 45 g 3    Lift Chair MISC by Does not apply route 1 each 0    Misc.  Devices (ADJUST BATH/SHOWER SEAT/BACK) MISC Use daily for shower 1 each 0    pantoprazole (PROTONIX) 40 MG tablet Take 1 tablet by mouth 2 times daily 60 tablet 3    Alcohol Swabs (B-D SINGLE USE SWABS REGULAR) PADS THREE TIMES A  each 0    nitroGLYCERIN (NITROSTAT) 0.4 MG SL tablet 1 under the tongue as needed for angina, may repeat q5mins for up three doses      Blood Glucose Monitoring Suppl HARMEET Use daily to check BS 1 Device 0    ipratropium-albuterol (DUONEB) 0.5-2.5 (3) MG/3ML SOLN nebulizer solution Inhale 3 mLs into the lungs every 4 hours 360 mL 2    Melatonin 10 MG TABS Take 10 mg by mouth nightly 90 tablet 3    Compression Stockings MISC by Does not apply route Pressure between 20 - 25 . 1 each 0    docusate sodium (COLACE) 100 MG capsule Take 1 capsule by mouth 2 times daily Please use while taking Percocet 30 capsule 0    SYMBICORT 160-4.5 MCG/ACT AERO   2 puffs As needed for sob      OXYGEN Inhale 3 L into the lungs       gabapentin (NEURONTIN) 300 MG capsule Take 1 capsule by mouth 3 times daily for 30 days. 90 capsule 2     No current facility-administered medications for this visit.               Social History     Socioeconomic History    Marital status: Legally      Spouse name: Not on file    Number of children: Not on file    Years of education: Not on file    Highest education level: Not on file   Occupational History    Occupation: disabled     Employer: N/A   Social Needs    Financial resource strain: Not on file    Food insecurity     Worry: Not on file     Inability: Not on file    Transportation needs     Medical: Not on file     Non-medical: Not on file   Tobacco Use    Smoking status: Former Smoker     Packs/day: 3.00     Years: 41.00     Pack years: 123.00     Types: Cigarettes     Quit date: 2000     Years since quittin.1    Smokeless tobacco: Never Used    Tobacco comment: quit in    Substance and Sexual Activity    Alcohol use: No     Alcohol/week: 0.0 standard drinks     Frequency: Never    Drug use: No    Sexual activity: Not Currently   Lifestyle    Physical activity     Days per week: Not on file     Minutes per session: Not on file    Stress: Not on file   Relationships  Social connections     Talks on phone: Not on file     Gets together: Not on file     Attends Jewish service: Not on file     Active member of club or organization: Not on file     Attends meetings of clubs or organizations: Not on file     Relationship status: Not on file    Intimate partner violence     Fear of current or ex partner: Not on file     Emotionally abused: Not on file     Physically abused: Not on file     Forced sexual activity: Not on file   Other Topics Concern    Not on file   Social History Narrative    Not on file     Counseling given: Yes  Comment: quit in 2000        Family History   Problem Relation Age of Onset    Diabetes Mother     Heart Disease Mother     Stomach Cancer Father     Diabetes Maternal Grandmother         also aunts and uncles (maternal)    Emphysema Sister              -rest of complaints with corresponding details per ROS    The patient's past medical, surgical, social, and family history as well as her current medications and allergies were reviewed as documented intoday's encounter. Review of Systems   Constitutional: Negative for activity change, appetite change, diaphoresis, fatigue, fever and unexpected weight change. HENT: Negative for congestion, ear pain, nosebleeds and postnasal drip. Eyes: Positive for visual disturbance. Negative for photophobia and redness. Respiratory: Negative for cough, chest tightness, shortness of breath and wheezing. Cardiovascular: Positive for leg swelling. Negative for chest pain and palpitations. Gastrointestinal: Negative for abdominal distention, abdominal pain, blood in stool, constipation, diarrhea, nausea and vomiting. Endocrine: Negative for polyphagia and polyuria. Genitourinary: Negative for difficulty urinating, dysuria, flank pain, frequency, hematuria, urgency and vaginal pain. Musculoskeletal: Positive for arthralgias, back pain, gait problem and joint swelling.  Negative for myalgias, health maintenance  Continue current medications, diet and exercise. Discussed use, benefit, and side effects of prescribed medications. Barriers to medication compliance addressed. Patient given educational materials - see patient instructions  Was a self-tracking handout given in paper form or via [employer]? Yes    Requested Prescriptions      No prescriptions requested or ordered in this encounter       All patient questions answered. Patient voiced understanding. Quality Measures    Body mass index is 47.3 kg/m². Elevated. Weight control planned discussed daily exercise regimen and Healthy diet and regular exercise. BP: 122/74. Blood pressure is normal. Treatment plan consists of No treatment change needed. Fall Risk 6/25/2020 3/7/2019 2/14/2018 1/4/2017 7/15/2015 2/5/2015   2 or more falls in past year? no no no no no no   Fall with injury in past year? no no no no no no     The patient does not have a history of falls. I did , complete a risk assessment for falls. A plan of care for falls home safety tips provided, No Treatment plan indicated    Lab Results   Component Value Date    LDLCHOLESTEROL 162 (H) 07/02/2020    (goal LDL reduction with dx if diabetes is 50% LDL reduction)    PHQ Scores 2/25/2021 6/25/2020 9/24/2019 3/7/2019 2/14/2018 7/15/2015   PHQ2 Score 0 1 1 2 0 0   PHQ9 Score 0 1 1 2 0 0     Interpretation of Total Score Depression Severity: 1-4 = Minimal depression, 5-9 = Mild depression, 10-14 = Moderate depression, 15-19 = Moderately severe depression, 20-27 = Severe depression      The patient'spast medical, surgical, social, and family history as well as her   current medications and allergies were reviewed as documented in today's encounter. Medications, labs, diagnostic studies, consultations andfollow-up as documented in this encounter. Return in about 3 months (around 5/25/2021) for dm ,htn, hld, A1C, 30min,always chronic conditons.     Patient wasseen with total face to face time of 30 minutes. More than 50% of this visit was counseling and education. Future Appointments   Date Time Provider Clarence Mas   2/26/2021  2:00 PM SCHEDULE, [address]   3/9/2021 10:30 AM STV CT ROOM 1 [facility] CT SCAN [facility]   3/29/2021 11:00 AM Briannen Eliane, APRN - CNP [facility] [place]   4/26/2021  2:00 PM [facility]   5/25/2021 10:15 AM Thalia Hernandez MD [facility]     This note was completed by using the assistance of a speech-recognition program. However, inadvertent computerized transcription errors may be present. Althoughevery effort was made to ensure accuracy, no guarantees can be provided that every mistake has been identified and corrected by editing.   Electronically signed by Thalia Hernandez MD on 2/25/2021  11:06 AM

## 2021-02-26 ENCOUNTER — HOSPITAL ENCOUNTER (OUTPATIENT)
Dept: LAB | Age: 71
Setting detail: SPECIMEN
Discharge: HOME | End: 2021-02-26
Payer: COMMERCIAL

## 2021-02-26 ENCOUNTER — HOSPITAL ENCOUNTER (OUTPATIENT)
Age: 71
Discharge: HOME | End: 2021-02-26
Payer: COMMERCIAL

## 2021-02-26 DIAGNOSIS — F11.90 CHRONIC, CONTINUOUS USE OF OPIOIDS: ICD-10-CM

## 2021-02-26 DIAGNOSIS — Z01.818 PRE-OP TESTING: Primary | ICD-10-CM

## 2021-02-26 DIAGNOSIS — Z51.81 MEDICATION MONITORING ENCOUNTER: Chronic | ICD-10-CM

## 2021-02-26 DIAGNOSIS — M51.36 DDD (DEGENERATIVE DISC DISEASE), LUMBAR: ICD-10-CM

## 2021-02-26 PROCEDURE — 80307 DRUG TEST PRSMV CHEM ANLYZR: CPT

## 2021-02-26 PROCEDURE — U0003 INFECTIOUS AGENT DETECTION BY NUCLEIC ACID (DNA OR RNA); SEVERE ACUTE RESPIRATORY SYNDROME CORONAVIRUS 2 (SARS-COV-2) (CORONAVIRUS DISEASE [COVID-19]), AMPLIFIED PROBE TECHNIQUE, MAKING USE OF HIGH THROUGHPUT TECHNOLOGIES AS DESCRIBED BY CMS-2020-01-R: HCPCS

## 2021-02-26 PROCEDURE — U0005 INFEC AGEN DETEC AMPLI PROBE: HCPCS

## 2021-02-26 NOTE — TELEPHONE ENCOUNTER
Writer called Yaima Hernandez to advise her that we received the OK to hold her Plavix from Dr Virgie Easton, at this time pt states that she already stopped that on Wednesday 2/24/21. Writer thanked and all ended.

## 2021-02-28 DIAGNOSIS — E78.2 MIXED HYPERLIPIDEMIA: Primary | Chronic | ICD-10-CM

## 2021-02-28 LAB
SARS-COV-2: NORMAL
SARS-COV-2: NOT DETECTED
SOURCE: NORMAL

## 2021-02-28 RX ORDER — ATORVASTATIN CALCIUM 40 MG/1
40 TABLET, FILM COATED ORAL DAILY
Qty: 90 TABLET | Refills: 3 | Status: SHIPPED | OUTPATIENT
Start: 2021-02-28

## 2021-03-01 ENCOUNTER — TELEPHONE (OUTPATIENT)
Dept: PRIMARY CARE CLINIC | Age: 71
End: 2021-03-01

## 2021-03-01 ENCOUNTER — ANESTHESIA EVENT (OUTPATIENT)
Dept: ENDOSCOPY | Age: 71
End: 2021-03-01
Payer: COMMERCIAL

## 2021-03-02 ENCOUNTER — HOSPITAL ENCOUNTER (OUTPATIENT)
Age: 71
Setting detail: OUTPATIENT SURGERY
Discharge: HOME | End: 2021-03-02
Attending: INTERNAL MEDICINE | Admitting: INTERNAL MEDICINE
Payer: COMMERCIAL

## 2021-03-02 ENCOUNTER — ANESTHESIA (OUTPATIENT)
Dept: ENDOSCOPY | Age: 71
End: 2021-03-02
Payer: COMMERCIAL

## 2021-03-02 VITALS
WEIGHT: 254 LBS | BODY MASS INDEX: 46.74 KG/M2 | RESPIRATION RATE: 16 BRPM | HEIGHT: 62 IN | OXYGEN SATURATION: 96 % | DIASTOLIC BLOOD PRESSURE: 72 MMHG | SYSTOLIC BLOOD PRESSURE: 142 MMHG | HEART RATE: 78 BPM | TEMPERATURE: 97.5 F

## 2021-03-02 VITALS
RESPIRATION RATE: 16 BRPM | SYSTOLIC BLOOD PRESSURE: 100 MMHG | OXYGEN SATURATION: 100 % | DIASTOLIC BLOOD PRESSURE: 57 MMHG

## 2021-03-02 LAB
GLUCOSE BLD-MCNC: 78 MG/DL (ref 65–105)
GLUCOSE BLD-MCNC: 88 MG/DL (ref 65–105)

## 2021-03-02 PROCEDURE — 7100000011 HC PHASE II RECOVERY - ADDTL 15 MIN: Performed by: INTERNAL MEDICINE

## 2021-03-02 PROCEDURE — 2500000003 HC RX 250 WO HCPCS

## 2021-03-02 PROCEDURE — 6360000002 HC RX W HCPCS

## 2021-03-02 PROCEDURE — 2709999900 HC NON-CHARGEABLE SUPPLY: Performed by: INTERNAL MEDICINE

## 2021-03-02 PROCEDURE — 3609010300 HC COLONOSCOPY W/BIOPSY SINGLE/MULTIPLE: Performed by: INTERNAL MEDICINE

## 2021-03-02 PROCEDURE — 3700000001 HC ADD 15 MINUTES (ANESTHESIA): Performed by: INTERNAL MEDICINE

## 2021-03-02 PROCEDURE — 3700000000 HC ANESTHESIA ATTENDED CARE: Performed by: INTERNAL MEDICINE

## 2021-03-02 PROCEDURE — 7100000031 HC ASPR PHASE II RECOVERY - ADDTL 15 MIN: Performed by: INTERNAL MEDICINE

## 2021-03-02 PROCEDURE — 82947 ASSAY GLUCOSE BLOOD QUANT: CPT

## 2021-03-02 PROCEDURE — 7100000010 HC PHASE II RECOVERY - FIRST 15 MIN: Performed by: INTERNAL MEDICINE

## 2021-03-02 PROCEDURE — 2580000003 HC RX 258: Performed by: ANESTHESIOLOGY

## 2021-03-02 PROCEDURE — 43239 EGD BIOPSY SINGLE/MULTIPLE: CPT | Performed by: INTERNAL MEDICINE

## 2021-03-02 PROCEDURE — 7100000001 HC PACU RECOVERY - ADDTL 15 MIN: Performed by: INTERNAL MEDICINE

## 2021-03-02 PROCEDURE — 45380 COLONOSCOPY AND BIOPSY: CPT | Performed by: INTERNAL MEDICINE

## 2021-03-02 PROCEDURE — 88305 TISSUE EXAM BY PATHOLOGIST: CPT

## 2021-03-02 PROCEDURE — 7100000030 HC ASPR PHASE II RECOVERY - FIRST 15 MIN: Performed by: INTERNAL MEDICINE

## 2021-03-02 PROCEDURE — 3609012400 HC EGD TRANSORAL BIOPSY SINGLE/MULTIPLE: Performed by: INTERNAL MEDICINE

## 2021-03-02 PROCEDURE — 7100000000 HC PACU RECOVERY - FIRST 15 MIN: Performed by: INTERNAL MEDICINE

## 2021-03-02 RX ORDER — ONDANSETRON 2 MG/ML
4 INJECTION INTRAMUSCULAR; INTRAVENOUS
Status: DISCONTINUED | OUTPATIENT
Start: 2021-03-02 | End: 2021-03-02 | Stop reason: HOSPADM

## 2021-03-02 RX ORDER — SODIUM CHLORIDE 0.9 % (FLUSH) 0.9 %
10 SYRINGE (ML) INJECTION EVERY 12 HOURS SCHEDULED
Status: DISCONTINUED | OUTPATIENT
Start: 2021-03-02 | End: 2021-03-02 | Stop reason: HOSPADM

## 2021-03-02 RX ORDER — SODIUM CHLORIDE 0.9 % (FLUSH) 0.9 %
10 SYRINGE (ML) INJECTION PRN
Status: DISCONTINUED | OUTPATIENT
Start: 2021-03-02 | End: 2021-03-02 | Stop reason: HOSPADM

## 2021-03-02 RX ORDER — LIDOCAINE HYDROCHLORIDE 10 MG/ML
1 INJECTION, SOLUTION EPIDURAL; INFILTRATION; INTRACAUDAL; PERINEURAL
Status: DISCONTINUED | OUTPATIENT
Start: 2021-03-02 | End: 2021-03-02 | Stop reason: HOSPADM

## 2021-03-02 RX ORDER — DIPHENHYDRAMINE HYDROCHLORIDE 50 MG/ML
12.5 INJECTION INTRAMUSCULAR; INTRAVENOUS
Status: DISCONTINUED | OUTPATIENT
Start: 2021-03-02 | End: 2021-03-02 | Stop reason: HOSPADM

## 2021-03-02 RX ORDER — SODIUM CHLORIDE 9 MG/ML
INJECTION, SOLUTION INTRAVENOUS CONTINUOUS
Status: DISCONTINUED | OUTPATIENT
Start: 2021-03-02 | End: 2021-03-02 | Stop reason: HOSPADM

## 2021-03-02 RX ORDER — LABETALOL HYDROCHLORIDE 5 MG/ML
5 INJECTION, SOLUTION INTRAVENOUS EVERY 10 MIN PRN
Status: DISCONTINUED | OUTPATIENT
Start: 2021-03-02 | End: 2021-03-02 | Stop reason: HOSPADM

## 2021-03-02 RX ORDER — LIDOCAINE HYDROCHLORIDE 10 MG/ML
INJECTION, SOLUTION EPIDURAL; INFILTRATION; INTRACAUDAL; PERINEURAL PRN
Status: DISCONTINUED | OUTPATIENT
Start: 2021-03-02 | End: 2021-03-02 | Stop reason: SDUPTHER

## 2021-03-02 RX ORDER — OXYCODONE HYDROCHLORIDE AND ACETAMINOPHEN 5; 325 MG/1; MG/1
1 TABLET ORAL PRN
Status: DISCONTINUED | OUTPATIENT
Start: 2021-03-02 | End: 2021-03-02 | Stop reason: HOSPADM

## 2021-03-02 RX ORDER — OXYCODONE HYDROCHLORIDE AND ACETAMINOPHEN 5; 325 MG/1; MG/1
2 TABLET ORAL PRN
Status: DISCONTINUED | OUTPATIENT
Start: 2021-03-02 | End: 2021-03-02 | Stop reason: HOSPADM

## 2021-03-02 RX ORDER — PROPOFOL 10 MG/ML
INJECTION, EMULSION INTRAVENOUS PRN
Status: DISCONTINUED | OUTPATIENT
Start: 2021-03-02 | End: 2021-03-02 | Stop reason: SDUPTHER

## 2021-03-02 RX ADMIN — SODIUM CHLORIDE: 9 INJECTION, SOLUTION INTRAVENOUS at 10:44

## 2021-03-02 RX ADMIN — PROPOFOL 300 MG: 10 INJECTION, EMULSION INTRAVENOUS at 11:16

## 2021-03-02 RX ADMIN — LIDOCAINE HYDROCHLORIDE 50 MG: 10 INJECTION, SOLUTION EPIDURAL; INFILTRATION; INTRACAUDAL; PERINEURAL at 11:16

## 2021-03-02 ASSESSMENT — PULMONARY FUNCTION TESTS
PIF_VALUE: 1

## 2021-03-02 ASSESSMENT — ENCOUNTER SYMPTOMS
ABDOMINAL DISTENTION: 1
WHEEZING: 0
BLOOD IN STOOL: 0
RHINORRHEA: 0
SHORTNESS OF BREATH: 1
NAUSEA: 1
VOMITING: 0
SORE THROAT: 0
ABDOMINAL PAIN: 1
COUGH: 0
CONSTIPATION: 1
DIARRHEA: 1

## 2021-03-02 ASSESSMENT — PAIN - FUNCTIONAL ASSESSMENT: PAIN_FUNCTIONAL_ASSESSMENT: 0-10

## 2021-03-02 ASSESSMENT — PAIN SCALES - GENERAL: PAINLEVEL_OUTOF10: 0

## 2021-03-02 NOTE — ANESTHESIA PRE PROCEDURE
BEDTIME )  8/10/20   Michael Araya MD   gabapentin (NEURONTIN) 300 MG capsule Take 1 capsule by mouth 3 times daily for 30 days. 7/30/20 2/24/21  VERONIKA Veronica - CNP   nystatin (MYCOSTATIN) 510988 UNIT/GM powder Apply 3 times daily. 6/25/20   Michael Araya MD   insulin aspart (NOVOLOG FLEXPEN) 100 UNIT/ML injection pen If <139 - No Insulin; 140-199-2 Units;200-249-4 Units;250-299-6 Units;300-349-8 Units;350-400=10 Units; Above 400-12 Units 2/12/20   Michael Araya MD   aspirin 81 MG chewable tablet Take 1 tablet by mouth daily 12/3/19   Michael Araya MD   lidocaine (LMX) 4 % cream Apply topically every 8 hrs as needed for pain 11/11/19   Michael Araya MD   Lift Chair MISC by Does not apply route 9/24/19   Michael Araya MD   Misc.  Devices (ADJUST BATH/SHOWER SEAT/BACK) MISC Use daily for shower 9/24/19   Michael Araya MD   pantoprazole (PROTONIX) 40 MG tablet Take 1 tablet by mouth 2 times daily 3/15/19   Konstantinbron Shasta, DO   Alcohol Swabs (B-D SINGLE USE SWABS REGULAR) PADS THREE TIMES A DAY 12/12/18   Michael Araya MD   nitroGLYCERIN (NITROSTAT) 0.4 MG SL tablet 1 under the tongue as needed for angina, may repeat q5mins for up three doses 8/28/18   Historical Provider, MD   Blood Glucose Monitoring Suppl HARMEET Use daily to check BS 3/27/18   Michael Araya MD   ipratropium-albuterol (DUONEB) 0.5-2.5 (3) MG/3ML SOLN nebulizer solution Inhale 3 mLs into the lungs every 4 hours 2/6/18   Savanah Cavazos MD   Melatonin 10 MG TABS Take 10 mg by mouth nightly 10/19/17   Michael Araya MD   Compression Stockings MISC by Does not apply route Pressure between 20 - 25 . 9/11/17   Michael Araya MD   docusate sodium (COLACE) 100 MG capsule Take 1 capsule by mouth 2 times daily Please use while taking Percocet 9/10/15   Dede Armenta MD   SYMBICORT 160-4.5 MCG/ACT AERO   2 puffs As needed for sob 5/28/15   Historical Provider, MD   OXYGEN Inhale 3 L into the lungs     Historical Provider, MD       Current medications:    Current Facility-Administered Medications   Medication Dose Route Frequency Provider Last Rate Last Admin    0.9 % sodium chloride infusion   Intravenous Continuous Sierra Estrada MD        sodium chloride flush 0.9 % injection 10 mL  10 mL Intravenous 2 times per day Sierra Estrada MD        sodium chloride flush 0.9 % injection 10 mL  10 mL Intravenous PRN Sierra Estrada MD        lidocaine PF 1 % injection 1 mL  1 mL Intradermal Once PRN Sierra Estrada MD           Allergies: Allergies   Allergen Reactions    Robitussin [Guaifenesin] Anaphylaxis    Codeine Swelling    Compazine [Prochlorperazine Maleate] Hives and Swelling    Iodides Itching    Morphine Other (See Comments)     hallucinations    Moxifloxacin      Other reaction(s): Intolerance-unknown  Other reaction(s):  Intolerance-unknown    Reglan [Metoclopramide] Nausea Only    Avelox [Moxifloxacin Hcl In Nacl] Rash     Dermatitis      Cipro Xr Rash     dermatitis    Sulfa Antibiotics Rash       Problem List:    Patient Active Problem List   Diagnosis Code    Chronic pain of left knee M25.562, G89.29    Type 2 diabetes mellitus with diabetic polyneuropathy, with long-term current use of insulin ([facility]) E11.42, Z79.4    Nonintractable migraine, unspecified migraine type G43.009    Coronary artery disease due to lipid rich plaque I25.10, I25.83    DDD (degenerative disc disease), lumbar M51.36    Chronic, continuous use of opioids F11.90    DDD (degenerative disc disease), cervical M50.30    Osteoarthritis of cervical spine M47.812    Medication monitoring encounter Z51.81    GERD (gastroesophageal reflux disease) K21.9    HTN (hypertension), benign I10    Mixed hyperlipidemia E78.2    Insomnia G47.00    Lumbosacral spondylosis without myelopathy M47.817    Sacroiliitis, not elsewhere classified (HCC) M46.1    Carpal tunnel syndrome G56.00    Mixed stress and urge urinary incontinence N39.46    coffee/day    CH[facility]congestive heart failure) ([facility])     Chronic kidney disease     CKD (chronic kidney disease) stage 3, GFR 30-59 ml/min     COPD (chronic obstructive pulmonary disease) ([facility])     emphysema    Degeneration of lumbar or lumbosacral intervertebral disc     Depression     DM (diabetes mellitus) ([facility])     Emphysema of lung ([facility])     Gastritis     GERD (gastroesophageal reflux disease)     Hearing loss     Hematuria     Hiatal hernia     HTN (hypertension), benign 6/28/2014    Hypertriglyceridemia     Incontinence of urine 9/25/2013    Irritable bowel syndrome with both constipation and diarrhea 1/26/2021    Knee arthropathy     Lumbosacral spondylosis without myelopathy 9/29/2014    Migraine headache     DR. BASIL [place]     Neuropathy     Obesity     On home oxygen therapy     2 L PER NC  HS AND PRN    HIGINIO on CPAP     Otitis media 1-26-15    Secondary diabetes mellitus with stage 3 chronic kidney disease (GFR 30-59) ([facility])     Stroke ([facility] [address]      mini stroke.  Tinnitus     Type 2 diabetes mellitus without complication ([facility])     Type II or unspecified type diabetes mellitus without mention of complication, not stated as uncontrolled     UTI (lower urinary tract infection)     Varicose vein        Past Surgical History:        Procedure Laterality Date    ABLATION OF DYSRHYTHMIC FOCUS  2000    CARPAL TUNNEL RELEASE Right     CATARACT REMOVAL WITH IMPLANT Bilateral     CHOLECYSTECTOMY  2007    COLONOSCOPY      COLONOSCOPY  04/10/2017    tubular adenomo polyp , mod. sigmoid diverticulosis, min. int. hemorrhoids    CYSTOSCOPY  9/25/2013    DILATION AND CURETTAGE  1979    EYE SURGERY      laser    INCONTINENCE SURGERY      Percutaneous nerve stimulation Dr Horton Bamberger Nov 2013     INSERTABLE CARDIAC MONITOR  12/04/2015    [employer] REVEAL LINQ MODEL #[id number] MRI CONDTIONAL OK 3T.  IMMEDIATELY POST IMPLANT    OTHER SURGICAL HISTORY  09/10/15    removal of interstim    MA COLSC FLX W/REMOVAL LESION BY HOT BX FORCEPS N/A 4/10/2017    COLONOSCOPY POLYPECTOMY HOT BIOPSY performed by Riccardo Wyatt MD at [address]      TYMPANOPLASTY      TYMPANOSTOMY TUBE PLACEMENT  2017    UPPER GASTROINTESTINAL ENDOSCOPY  2016    Dr Geri Calvin History:    Social History     Tobacco Use    Smoking status: Former Smoker     Packs/day: 3.00     Years: 41.00     Pack years: 123.00     Types: Cigarettes     Quit date: 2000     Years since quittin.1    Smokeless tobacco: Never Used    Tobacco comment: quit in    Substance Use Topics    Alcohol use: No     Alcohol/week: 0.0 standard drinks     Frequency: Never                                Counseling given: Not Answered  Comment: quit in       Vital Signs (Current):   Vitals:    21 1005   BP: 121/62   Pulse: 72   Resp: 18   Temp: 96.8 °F (36 °C)   TempSrc: Infrared   SpO2: 95%   Weight: 254 lb (115.2 kg)   Height: 5' 2\" (1.575 m)                                              BP Readings from Last 3 Encounters:   21 121/62   21 122/74   21 (!) 143/74       NPO Status:                                                                                 BMI:   Wt Readings from Last 3 Encounters:   21 254 lb (115.2 kg)   21 258 lb 9.6 oz (117.3 kg)   21 250 lb (113.4 kg)     Body mass index is 46.46 kg/m².     CBC:   Lab Results   Component Value Date    WBC 7.8 10/30/2020    RBC 4.44 10/30/2020    RBC 3.37 2012    HGB 13.6 10/30/2020    HCT 42.6 10/30/2020    MCV 95.9 10/30/2020    RDW 12.6 10/30/2020     10/30/2020     2012       CMP:   Lab Results   Component Value Date     2020    K 5.0 2020     2020    CO2 26 2020    BUN 21 2020    CREATININE 1.23 2020    GFRAA 52 2020    LABGLOM 43 2020    GLUCOSE 172 2020    GLUCOSE 123 2012 PROT 6.9 10/30/2020    CALCIUM 8.4 11/01/2020    BILITOT 0.20 10/30/2020    ALKPHOS 62 10/30/2020    AST 21 10/30/2020    ALT 29 10/30/2020       POC Tests: No results for input(s): POCGLU, POCNA, POCK, POCCL, POCBUN, POCHEMO, POCHCT in the last 72 hours. Coags:   Lab Results   Component Value Date    PROTIME 10.5 10/31/2019    PROTIME 11.0 11/17/2011    INR 1.0 10/31/2019    APTT 55.8 06/08/2016       HCG (If Applicable):   Lab Results   Component Value Date    PREGTESTUR NEGATIVE 06/05/2016        ABGs:   Lab Results   Component Value Date    PO2ART 61.3 11/21/2011    GMM3WYN 26.5 11/21/2011    [id number] 89.7 11/21/2011        Type & Screen (If Applicable):  No results found for: LABABO, LABRH    Drug/Infectious Status (If Applicable):  Lab Results   Component Value Date    HEPCAB NONREACTIVE 06/01/2017       COVID-19 Screening (If Applicable):   Lab Results   Component Value Date    COVID19 Not Detected 02/26/2021         Anesthesia Evaluation  Patient summary reviewed and Nursing notes reviewed no history of anesthetic complications:   Airway: Mallampati: III  TM distance: >3 FB   Neck ROM: full  Mouth opening: > = 3 FB Dental: normal exam         Pulmonary:normal exam  breath sounds clear to auscultation  (+) COPD:  sleep apnea:  asthma:                           ROS comment: On home oxygen therapy 2 L PER NC  HS AND PRN    Cardiovascular:    (+) hypertension:, CAD:, dysrhythmias: atrial fibrillation, CHF:, hyperlipidemia      ECG reviewed  Rhythm: irregular  Rate: normal  Echocardiogram reviewed         Beta Blocker:  Dose within 24 Hrs      ROS comment: Normal left ventricle size, wall thickness and function. Calculated EF via Alcantar's method is 66 %.      Neuro/Psych:   (+) CVA:, neuromuscular disease:, headaches: migraine headaches, psychiatric history:depression/anxiety              ROS comment: DDD  Polyneuropathy GI/Hepatic/Renal:   (+) hiatal hernia, GERD:, renal disease: CRI, morbid obesity

## 2021-03-02 NOTE — ANESTHESIA POSTPROCEDURE EVALUATION
Department of Anesthesiology  Postprocedure Note    Patient: Abhijeet Arizmendi  MRN: [id number]  YOB: 1950  Date of evaluation: 3/2/2021  Time:  1:24 PM     Procedure Summary     Date: 03/02/21 Room / Location: [facility] ENDO 04 / [facility] ENDO    Anesthesia Start: 1109 Anesthesia Stop: 2220    Procedures:       EGD BIOPSY (N/A Esophagus)      COLONOSCOPY WITH BIOPSY (N/A Anus) Diagnosis: (IBS W/CONSTIPATION & DIARRHEA ADENOMATOUS POLYP OF ASCENDING COLON MORBID OBESITY GERD ABDOMINAL BLOATING)    Surgeons: Shellie Mcdonald MD Responsible Provider: Artemio Beyer MD    Anesthesia Type: general, MAC ASA Status: 3          Anesthesia Type: general, MAC    Wanda Phase I: Wanda Score: 10    Wanda Phase II: Wanda Score: 10    Last vitals: Reviewed and per EMR flowsheets.        Anesthesia Post Evaluation    Comments: POST- ANESTHESIA EVALUATION       Pt Name: Abhijeet Arizmendi  MRN: [id number]  YOB: 1950  Date of evaluation: 3/2/2021  Time:  1:24 PM      BP (!) 142/72   Pulse 78   Temp 97.5 °F (36.4 °C) (Infrared)   Resp 16   Ht 5' 2\" (1.575 m)   Wt 254 lb (115.2 kg)   LMP  (LMP Unknown)   SpO2 96%   BMI 46.46 kg/m²      Consciousness Level  Awake  Cardiopulmonary Status  Stable  Pain Adequately Treated YES  Nausea / Vomiting  NO  Adequate Hydration  YES  Anesthesia Related Complications NONE      Electronically signed by Artemio Beyer MD on 3/2/2021 at 1:24 PM

## 2021-03-02 NOTE — OP NOTE
PROCEDURE NOTE    DATE OF PROCEDURE: 3/2/2021     SURGEON: Suzanne Foster MD    ASSISTANT: None    PREOPERATIVE DIAGNOSIS: GERD  ABDOMINAL PAINS    POSTOPERATIVE DIAGNOSIS: As described below    OPERATION: Upper GI endoscopy with Biopsy    ANESTHESIA: MAC PER ANESTHESIA     ESTIMATED BLOOD LOSS: Less than 50 ml    COMPLICATIONS: None. SPECIMENS:  Was Obtained:     HISTORY: The patient is a 69y.o. year old female with history of above preop diagnosis. I recommended esophagogastroduodenoscopy with possible biopsy and I explained the risk, benefits, expected outcome, and alternatives to the procedure. Risks included but are not limited to bleeding, infection, respiratory distress, hypotension, and perforation of the esophagus, stomach, or duodenum. Patient understands and is in agreement. PROCEDURE: The patient was given IV conscious sedation. The patient's SPO2 remained above 90% throughout the procedure. The gastroscope was inserted orally and advanced under direct vision through the esophagus, through the stomach, through the pylorus, and into the descending duodenum. Findings:    Retropharyngeal area was grossly normal appearing    Esophagus: normal    Stomach:    Fundus: normal    Body: abnormal: MILD GASTRITIS    Antrum: abnormal: MILD TO MOD GASTRITIS BIOPSIES WERE TAKEN FOR H PYLORI    Duodenum:     Descending: normal    Bulb: normal    The scope was removed and the patient tolerated the procedure well. Recommendations/Plan:   1. F/U Biopsies  2. F/U In Office in 3-4 weeks  3. Discussed with the family  4.  Post sedation patient was stable with stable vital signs and stable O2 saturations    Electronically signed by Suzanne Foster MD  on 3/2/2021 at 11:22 AM
Haven Schroeder MD  on 3/2/2021 at 11:42 AM

## 2021-03-03 DIAGNOSIS — E11.42 TYPE 2 DIABETES MELLITUS WITH DIABETIC POLYNEUROPATHY, WITH LONG-TERM CURRENT USE OF INSULIN (HCC): Chronic | ICD-10-CM

## 2021-03-03 DIAGNOSIS — Z79.4 TYPE 2 DIABETES MELLITUS WITH DIABETIC POLYNEUROPATHY, WITH LONG-TERM CURRENT USE OF INSULIN (HCC): Chronic | ICD-10-CM

## 2021-03-03 LAB — SURGICAL PATHOLOGY REPORT: NORMAL

## 2021-03-03 RX ORDER — INSULIN ASPART 100 [IU]/ML
INJECTION, SOLUTION INTRAVENOUS; SUBCUTANEOUS
Qty: 15 ML | Refills: 3 | Status: SHIPPED | OUTPATIENT
Start: 2021-03-03 | End: 2022-03-30

## 2021-03-04 LAB
6-ACETYLMORPHINE, UR: NOT DETECTED
7-AMINOCLONAZEPAM, URINE: NOT DETECTED
ALPHA-OH-ALPRAZ, URINE: NOT DETECTED
ALPHA-OH-MIDAZOLAM, URINE: NOT DETECTED
ALPRAZOLAM, URINE: NOT DETECTED
AMPHETAMINES, URINE: NOT DETECTED
BARBITURATES, URINE: NOT DETECTED
BENZOYLECGONINE, UR: NOT DETECTED
BUPRENORPHINE URINE: NOT DETECTED
CARISOPRODOL, UR: NOT DETECTED
CLONAZEPAM, URINE: NOT DETECTED
CODEINE, URINE: NOT DETECTED
CREATININE URINE: 269 MG/DL (ref 20–400)
DIAZEPAM, URINE: NOT DETECTED
DRUGS EXPECTED, UR: NORMAL
EER HI RES INTERP UR: NORMAL
ETHYL GLUCURONIDE UR: NOT DETECTED
FENTANYL URINE: NOT DETECTED
GABAPENTIN: NOT DETECTED
HYDROCODONE, URINE: NOT DETECTED
HYDROMORPHONE, URINE: NOT DETECTED
LORAZEPAM, URINE: NOT DETECTED
MARIJUANA METAB, UR: NOT DETECTED
MDA, UR: NOT DETECTED
MDEA, EVE, UR: NOT DETECTED
MDMA URINE: NOT DETECTED
MEPERIDINE METAB, UR: NOT DETECTED
METHADONE, URINE: NOT DETECTED
METHAMPHETAMINE, URINE: NOT DETECTED
METHYLPHENIDATE: NOT DETECTED
MIDAZOLAM, URINE: NOT DETECTED
MORPHINE URINE: NOT DETECTED
NALOXONE URINE: NOT DETECTED
NORBUPRENORPHINE, URINE: NOT DETECTED
NORDIAZEPAM, URINE: NOT DETECTED
NORFENTANYL, URINE: NOT DETECTED
NORHYDROCODONE, URINE: NOT DETECTED
NOROXYCODONE, URINE: PRESENT
NOROXYMORPHONE, URINE: NOT DETECTED
OXAZEPAM, URINE: NOT DETECTED
OXYCODONE URINE: PRESENT
OXYMORPHONE, URINE: PRESENT
PAIN MANAGEMENT DRUG PANEL INTERP, URINE: NORMAL
PAIN MGT DRUG PANEL, HI RES, UR: NORMAL
PCP,URINE: NOT DETECTED
PHENTERMINE, UR: NOT DETECTED
PREGABALIN: NOT DETECTED
PROPOXYPHENE, URINE: NORMAL
TAPENTADOL, URINE: NOT DETECTED
TAPENTADOL-O-SULFATE, URINE: NOT DETECTED
TEMAZEPAM, URINE: NOT DETECTED
TRAMADOL, URINE: NOT DETECTED
ZOLPIDEM METABOLITE (ZCA), URINE: NOT DETECTED
ZOLPIDEM, URINE: NOT DETECTED

## 2021-03-05 RX ORDER — BLOOD SUGAR DIAGNOSTIC
STRIP MISCELLANEOUS
Qty: 100 EACH | Refills: 0 | Status: SHIPPED | OUTPATIENT
Start: 2021-03-05 | End: 2022-05-10 | Stop reason: ALTCHOICE

## 2021-03-09 ENCOUNTER — HOSPITAL ENCOUNTER (OUTPATIENT)
Dept: CT IMAGING | Age: 71
Discharge: HOME | End: 2021-03-11
Payer: COMMERCIAL

## 2021-03-09 DIAGNOSIS — R91.1 LUNG NODULE SEEN ON IMAGING STUDY: ICD-10-CM

## 2021-03-09 PROCEDURE — 71250 CT THORAX DX C-: CPT

## 2021-03-15 NOTE — DISCHARGE SUMMARY
CDU Discharge Summary        Patient:  Nicolasa Dc  YOB: 1950    MRN: [id number]   Acct: [de-identified]    Primary Care Physician: Teri Sandra MD    Admit date:  7/27/2018 11:11 AM  Discharge date: 7/28/2018  5:44 PM    Discharge Diagnoses:     Acute onset of left-sided pressure and chest pain, etiology unknown, cardiology consulted, Pain controlled with by mouth Tylenol and PO Norco    Follow-up:  Call today/tomorrow for a follow up appointment with Teri Sandra MD , cardiology or return to the Emergency Room with worsening symptoms    Stressed to patient the importance of following up with primary care doctor for further workup/management of symptoms. Pt verbalizes understanding and agrees with plan.     Discharge Medications:  Changes to medications: NONE        Mae Canavan   Home Medication Instructions [id number]    Printed on:07/29/18 0884   Medication Information                      amLODIPine (NORVASC) 5 MG tablet  TAKE 1 TAB BY MOUTH ONCE A DAY             atorvastatin (LIPITOR) 80 MG tablet  TAKE 1 TAB BY MOUTH ONCE A DAY             Blood Glucose Monitoring Suppl HARMEET  Use daily to check BS             BREO ELLIPTA 200-25 MCG/INH AEPB               Calcium Carb-Cholecalciferol (OYSCO D) 250-125 MG-UNIT TABS  Take 1 tablet by mouth daily             carvedilol (COREG) 3.125 MG tablet  TAKE 1 TAB BY MOUTH TWICE A DAY             citalopram (CELEXA) 40 MG tablet  TAKE 1 TAB BY MOUTH ONCE A DAY             clopidogrel (PLAVIX) 75 MG tablet  TAKE 1 TAB BY MOUTH ONCE A DAY             Compression Stockings MISC  by Does not apply route Pressure between 20 - 25 .             docusate sodium (COLACE) 100 MG capsule  Take 1 capsule by mouth 2 times daily Please use while taking Percocet             ferrous sulfate 325 (65 FE) MG tablet  Take 325 mg by mouth daily (with breakfast)             furosemide (LASIX) 20 MG tablet  TAKE 1 TABLET BY MOUTH AS NEEDED (LEG SWELLING) HgA1C ordered.    insulin glargine (LANTUS SOLOSTAR) 100 UNIT/ML injection pen  Inject 45 Units into the skin every morning             Insulin Pen Needle 31G X 6 MM MISC  1 each by Does not apply route daily             ipratropium-albuterol (DUONEB) 0.5-2.5 (3) MG/3ML SOLN nebulizer solution  Inhale 3 mLs into the lungs every 4 hours             isosorbide mononitrate (IMDUR) 30 MG extended release tablet  TAKE 1 TABLET BY MOUTH DAILY             losartan (COZAAR) 25 MG tablet  TAKE 1 TAB BY MOUTH ONCE A DAY             magnesium oxide (MAG-OX) 400 MG tablet  Take 400 mg by mouth 2 times daily. Melatonin 10 MG TABS  Take 10 mg by mouth nightly             metFORMIN (GLUCOPHAGE) 1000 MG tablet  TAKE 1 TAB BY MOUTH TWICE DAILY             nystatin (MYCOSTATIN) powder  Apply 3 times daily. oxyCODONE-acetaminophen (PERCOCET) 5-325 MG per tablet  Take 1 tablet by mouth See Admin Instructions for 30 days. 1 tab every 6-8 hours prn. Earliest Fill Date: 7/8/18             OXYGEN  Inhale 3 L into the lungs              pantoprazole (PROTONIX) 40 MG tablet  Take 1 tablet by mouth 2 times daily             PROAIR  (90 Base) MCG/ACT inhaler  INHALE 2 PUFFS BY INHALATION ROUTE 3 TIMES A DAY FOR 30 DAYS             ranitidine (ZANTAC) 150 MG tablet  TAKE 1 TAB BY MOUTH TWICE A DAY             sucralfate (CARAFATE) 1 GM tablet  Take 1 tablet by mouth 4 times daily             SYMBICORT 160-4.5 MCG/ACT AERO    2 puffs As needed for sob             tiZANidine (ZANAFLEX) 2 MG tablet  Take 1 tablet by mouth every 12 hours as needed (back pain) Causes sedation, do not drive while taking this medication. Short term.              topiramate (TOPAMAX) 100 MG tablet  Take 1 tablet by mouth nightly             ULTICARE SHORT PEN NEEDLES 31G X 8 MM MISC  AS DIRECTED                 Diet:    , Advance as tolerated     Activity:  As tolerated    Consultants: None    Procedures:  Cardiac stress test    Diagnostic Test: Results for orders placed or performed during the hospital encounter of 07/27/18   CBC WITH AUTO DIFFERENTIAL   Result Value Ref Range    WBC 7.9 3.5 - 11.3 k/uL    RBC 3.94 (L) 3.95 - 5.11 m/uL    Hemoglobin 11.8 (L) 11.9 - 15.1 g/dL    Hematocrit 37.5 36.3 - 47.1 %    MCV 95.2 82.6 - 102.9 fL    MCH 29.9 25.2 - 33.5 pg    MCHC 31.5 28.4 - 34.8 g/dL    RDW 13.2 11.8 - 14.4 %    Platelets 905 841 - 863 k/uL    MPV 11.7 8.1 - 13.5 fL    NRBC Automated 0.0 0.0 per 100 WBC    Differential Type NOT REPORTED     Seg Neutrophils 64 36 - 65 %    Lymphocytes 26 24 - 43 %    Monocytes 8 3 - 12 %    Eosinophils % 1 1 - 4 %    Basophils 1 0 - 2 %    Immature Granulocytes 0 0 %    Segs Absolute 5.02 1.50 - 8.10 k/uL    Absolute Lymph # 2.06 1.10 - 3.70 k/uL    Absolute Mono # 0.60 0.10 - 1.20 k/uL    Absolute Eos # 0.11 0.00 - 0.44 k/uL    Basophils # 0.04 0.00 - 0.20 k/uL    Absolute Immature Granulocyte 0.03 0.00 - 0.30 k/uL    WBC Morphology NOT REPORTED     RBC Morphology NOT REPORTED     Platelet Estimate NOT REPORTED    Basic Metabolic Panel   Result Value Ref Range    Glucose 115 (H) 70 - 99 mg/dL    BUN 14 8 - 23 mg/dL    CREATININE 0.84 0.50 - 0.90 mg/dL    Bun/Cre Ratio NOT REPORTED 9 - 20    Calcium 8.4 (L) 8.6 - 10.4 mg/dL    Sodium 137 135 - 144 mmol/L    Potassium 4.5 3.7 - 5.3 mmol/L    Chloride 101 98 - 107 mmol/L    CO2 23 20 - 31 mmol/L    Anion Gap 13 9 - 17 mmol/L    GFR Non-African American >60 >60 mL/min    GFR African American >60 >60 mL/min    GFR Comment          GFR Staging NOT REPORTED    Brain Natriuretic Peptide   Result Value Ref Range    Pro- <300 pg/mL    BNP Interpretation         Troponin   Result Value Ref Range    Troponin T <0.03 <0.03 ng/mL    Troponin Interp         Troponin   Result Value Ref Range    Troponin T <0.03 <0.03 ng/mL    Troponin Interp         Troponin   Result Value Ref Range    Troponin T <0.03 <0.03 ng/mL    Troponin Interp         TSH with Reflex   Result Value Rx    Result Date: 7/28/2018  EXAMINATION: MYOCARDIAL PERFUSION IMAGING 7/28/2018 1:03 pm TECHNIQUE: For the rest study, 15 mCi of Tc 99 labeled sestamibi were injected. SPECT images were acquired. Under cardiology supervision, 0.4mg [place] Phenes was infused. After pharmacologic stress, 40.6 mCi of Tc 99 labeled sestamibi were injected. SPECT images with ECG gating were acquired. COMPARISON: None Available. HISTORY: ORDERING SYSTEM PROVIDED HISTORY: chest pain TECHNOLOGIST PROVIDED HISTORY: Ordering Physician Provided Reason for Exam: chest pain, light headed, dizzy, HTN, DM, COPD, CAD GERD. DDD, Bilateral low back pain with sciatica FINDINGS: Images interpreted utilizing [employer] and [employer]. There is normal distribution of radiotracer throughout the myocardium without evidence for a significant reversible or fixed perfusion defect. The gated images show no wall motion abnormalities. Normal myocardial thickening. Perfusion scores are visually adjusted to account for artifact. Summed stress score:  1 Summed rest score:  0 Summed reversibility score:  1 Function: End diastolic volume:  99PU Left ventricular ejection fraction:  70% TID score:  1.09  (Scores greater than 1.39 are considered elevated for Lexiscan stress with Tc99m) Notes concerning risk stratification: Risk stratification incorporates both clinical history and some testing results. Final risk determination is the responsibility of the ordering provider as other patient information and test results may increase or decrease the risk assessment reported for this examination. Risk stratification criteria are adapted from \"Noninvasive Risk Stratification\" criteria from Nancy Cardenas. Al, ACC/AATS/AHA/ASE/ASNC/SCAI/SCCT/STS 2017 Appropriate Use Criteria For Coronary Revascularization in Patients With Stable Ischemic Heart Disease [facility] Volume 69, Issue 17, May 2017 High risk (>3% annual death or MI) 1.  Severe resting LV dysfunction (LVEF <35%) not readily explained by non coronary causes 2. Resting perfusion abnormalities greater than 10% of the myocardium in patients without prior history or evidence of MI 3. Stress-induced perfusion abnormalities encumbering greater than or equal to 10% myocardium or stress segmental scores indicating multiple vascular territories with abnormalities 4. Stress-induced LV dilatation (TID ratio greater than 1.19 for exercise and greater than 1.39 for regadenoson) Intermediate risk (1% to 3% annual death or MI) 1. Mild/moderate resting LV dysfunction (LVEF 35% to 49%) not readily explained by non coronary causes. 2. Resting perfusion abnormalities in 5%-9.9% of the myocardium in patients without a history or prior evidence of MI 3. Stress-induced perfusion abnormality encumbering 5%-9.9% of the myocardium or stress segmental scores indicating 1 vascular territory with abnormalities but without LV dilation 4. Small wall motion abnormality involving 1-2 segments and only 1 coronary bed. Low Risk (Less than 1% annual death or MI) 1. Normal or small myocardial perfusion defect at rest or with stress encumbering less than 5% of the myocardium. Normal study. Risk stratification: Low 1. Physical Exam:    General appearance - NAD, AOx 3  Lungs -CTAB, no R/R/R  Heart - RRR, no M/R/G  Abdomen - Soft, NT/ND  Neurological:  MAEx4, No focal motor deficit, sensory loss  Extremities - Cap refil <2 sec in all ext., BLLE non-pitting edema  Skin -warm, dry      Hospital Course:  Clinical course has improved, labs and imaging reviewed. Marisol Matthews originally presented to the hospital on 7/27/2018 11:11 AM. with acute onset of CP. At that time it was determined that She required further observation and cardiac stress test. She was admitted and labs and imaging were followed daily. Imaging results as above. Stress test was negative.  I will send her home with a 48 hour cardiac monitor due to her history of heart palpitations and PMHx of a-fib. She is medically stable to be discharged. Disposition: Home    Patient stated that they will not drive themselves home from the hospital if they have gotten pain killers/ narcotics earlier that day and that they will arrange for transportation on their own or work with the  for a ride. Patient counseled NOT to drive while under the influence of narcotics/ pain killers. Condition: Good    Patient stable and ready for discharge home. I have discussed plan of care with patient and they are in understanding. They were instructed to read discharge paperwork. All of their questions and concerns were addressed. Time Spent: 0 day      --  [id number],   Emergency Medicine Resident Physician    This dictation was generated by voice recognition computer software. Although all attempts are made to edit the dictation for accuracy, there may be errors in the transcription that are not intended.

## 2021-03-16 DIAGNOSIS — Z79.4 TYPE 2 DIABETES MELLITUS WITH DIABETIC POLYNEUROPATHY, WITH LONG-TERM CURRENT USE OF INSULIN (HCC): Chronic | ICD-10-CM

## 2021-03-16 DIAGNOSIS — E11.42 TYPE 2 DIABETES MELLITUS WITH DIABETIC POLYNEUROPATHY, WITH LONG-TERM CURRENT USE OF INSULIN (HCC): Chronic | ICD-10-CM

## 2021-03-16 RX ORDER — INSULIN GLARGINE 100 [IU]/ML
INJECTION, SOLUTION SUBCUTANEOUS
Qty: 15 ML | Refills: 3 | Status: SHIPPED | OUTPATIENT
Start: 2021-03-16 | End: 2021-04-26

## 2021-03-16 NOTE — TELEPHONE ENCOUNTER
Please Approve or Refuse.   Send to Pharmacy per Pt's Request:      Next Visit Date:  5/25/2021   Last Visit Date: 2/25/2021    Hemoglobin A1C (%)   Date Value   02/25/2021 7.8   09/14/2020 10.6   02/12/2020 8.4             ( goal A1C is < 7)   BP Readings from Last 3 Encounters:   03/02/21 (!) 100/57   03/02/21 (!) 142/72   02/25/21 122/74          (goal 120/80)  BUN   Date Value Ref Range Status   11/01/2020 21 8 - 23 mg/dL Final     CREATININE   Date Value Ref Range Status   11/01/2020 1.23 (H) 0.50 - 0.90 mg/dL Final     Potassium   Date Value Ref Range Status   11/01/2020 5.0 3.7 - 5.3 mmol/L Final

## 2021-03-29 ENCOUNTER — HOSPITAL ENCOUNTER (OUTPATIENT)
Dept: PAIN MANAGEMENT | Age: 71
Discharge: HOME | End: 2021-03-29
Payer: COMMERCIAL

## 2021-03-29 DIAGNOSIS — M50.30 DDD (DEGENERATIVE DISC DISEASE), CERVICAL: Chronic | ICD-10-CM

## 2021-03-29 DIAGNOSIS — F11.90 CHRONIC, CONTINUOUS USE OF OPIOIDS: ICD-10-CM

## 2021-03-29 DIAGNOSIS — M54.16 LUMBAR RADICULOPATHY, CHRONIC: ICD-10-CM

## 2021-03-29 DIAGNOSIS — M46.1 SACROILIITIS, NOT ELSEWHERE CLASSIFIED (HCC): Chronic | ICD-10-CM

## 2021-03-29 DIAGNOSIS — Z51.81 MEDICATION MONITORING ENCOUNTER: Chronic | ICD-10-CM

## 2021-03-29 DIAGNOSIS — M47.9 SPONDYLARTHROSIS: Primary | ICD-10-CM

## 2021-03-29 DIAGNOSIS — M51.36 DDD (DEGENERATIVE DISC DISEASE), LUMBAR: ICD-10-CM

## 2021-03-29 DIAGNOSIS — M47.892 OTHER OSTEOARTHRITIS OF SPINE, CERVICAL REGION: Chronic | ICD-10-CM

## 2021-03-29 DIAGNOSIS — M47.817 LUMBOSACRAL SPONDYLOSIS WITHOUT MYELOPATHY: Chronic | ICD-10-CM

## 2021-03-29 DIAGNOSIS — G62.9 POLYNEUROPATHY, UNSPECIFIED: ICD-10-CM

## 2021-03-29 PROCEDURE — 99442 PR PHYS/QHP TELEPHONE EVALUATION 11-20 MIN: CPT | Performed by: NURSE PRACTITIONER

## 2021-03-29 PROCEDURE — 99213 OFFICE O/P EST LOW 20 MIN: CPT

## 2021-03-29 RX ORDER — OFLOXACIN 3 MG/ML
SOLUTION/ DROPS OPHTHALMIC
Status: ON HOLD | COMMUNITY
Start: 2021-03-16 | End: 2021-10-05

## 2021-03-29 RX ORDER — OXYCODONE HYDROCHLORIDE AND ACETAMINOPHEN 5; 325 MG/1; MG/1
1 TABLET ORAL SEE ADMIN INSTRUCTIONS
Qty: 100 TABLET | Refills: 0 | Status: SHIPPED | OUTPATIENT
Start: 2021-03-31 | End: 2021-04-30 | Stop reason: SDUPTHER

## 2021-03-29 RX ORDER — BLOOD-GLUCOSE METER
EACH MISCELLANEOUS
COMMUNITY
Start: 2021-02-10 | End: 2022-07-25

## 2021-03-29 ASSESSMENT — ENCOUNTER SYMPTOMS
EYES NEGATIVE: 1
GASTROINTESTINAL NEGATIVE: 1
BACK PAIN: 1

## 2021-03-29 NOTE — PROGRESS NOTES
Snehal 88 PROGRESS NOTE      Patient  completed []  video visit   [x]   phone call:11         Minutes :       [x]    to  review Medication Agreement    []  Follow up after procedure   []  Discuss treatment options      Location:  Provider:  working from    [x]    home    []   [facility] - KELLEE VILLANUEVA ,   patient at home    Chief Complaint:  Low back pain    She c/o low back pain radiating down legs, Her pain has increased, She has no history of lumbar surgery, She states did not get much relief from PT, Her sleep is good, She states does home exercises, No ED visits, She states had both covid  Vaccines. Back Pain  This is a chronic problem. The problem occurs intermittently. The problem is unchanged. The pain is present in the lumbar spine. Quality: throbbing. Radiates to: down legs. The pain is at a severity of 7/10. The pain is moderate. The pain is the same all the time. The symptoms are aggravated by sitting. Associated symptoms include weakness. Risk factors: sitting with legs elevated.          Treatment goals:  Functional status: keep going    Aberrancy:   Any alcoholic beverages   no         Any illegal drugs   no      Analgesia:         7            Adverse  Effects :no      ADL;s :home exercise    Data:    When was thelast UDS:  2-6-2021    Record/Diagnostics Review:      As above, I did review the imaging     3/8/2021  7:53 AM - Carmelo, Justino Incoming Lab Results From [facility]    Component Value Ref Range & Units Status Collected Lab   Pain Management Drug Panel Interp, Urine Consistent   Final 02/26/2021  1:54 PM [facility]   (NOTE)   ________________________________________________________________   DRUGS EXPECTED:   OXYCODONE   ________________________________________________________________   CONSISTENT with medications provided:   OXYCODONE: based on oxycodone, noroxycodone, oxymorphone   ________________________________________________________________   INTERPRETIVE INFORMATION: Targeted drug profile Interp   Interpretation depends on accuracy and completeness of patient   medication information submitted by client. Pill count: appropriate    fill date :3-    Morphine equivalent dose as reported on [place]ARRS:25  Periodic Controlled Substance Monitoring: Possible medication side effects, risk of tolerance/dependence & alternative treatments discussed., No signs of potential drug abuse or diversion identified. , Assessed functional status., Obtaining appropriate analgesic effect of treatment. Lotus Cast, APRN - CNP)  Review ofOARRS does not show any aberrant prescription behavior. Medication is helping the patient stay active. Patient denies any side effects and reports adequate analgesia. No sign of misuse/abuse. Past Medical History:   Diagnosis Date    Abdominal bloating 1/26/2021    Adenomatous polyp of ascending colon 1/26/2021    Allergic rhinitis     Anxiety 7/17/2013    Arthritis     Asthma     Atrial fibrillation (HCC)     Back pain     NERVE/DR. GRACIA    Benign hypertension with CKD (chronic kidney disease) stage III     CAD (coronary artery disease) 3/21/2013    Caffeine use     2 coffee/day    CHF (congestive heart failure) (HCC)     Chronic kidney disease     CKD (chronic kidney disease) stage 3, GFR 30-59 ml/min     COPD (chronic obstructive pulmonary disease) (HCC)     emphysema    Degeneration of lumbar or lumbosacral intervertebral disc     Depression     DM (diabetes mellitus) (HCC)     Emphysema of lung (HCC)     Gastritis     GERD (gastroesophageal reflux disease)     Hearing loss     Hematuria     Hiatal hernia     HTN (hypertension), benign 6/28/2014    Hypertriglyceridemia     Incontinence of urine 9/25/2013    Irritable bowel syndrome with both constipation and diarrhea 1/26/2021    Knee arthropathy     Lumbosacral spondylosis without myelopathy 9/29/2014    Migraine headache     DR. BASIL [place]  Neuropathy     Obesity     On home oxygen therapy     2 L PER NC  HS AND PRN    HIGINIO on CPAP     Otitis media 1-26-15    Secondary diabetes mellitus with stage 3 chronic kidney disease (GFR 30-59) ([facility])     Stroke ([facility] [address]      mini stroke.  Tinnitus     Type 2 diabetes mellitus without complication ([facility])     Type II or unspecified type diabetes mellitus without mention of complication, not stated as uncontrolled     UTI (lower urinary tract infection)     Varicose vein        Past Surgical History:   Procedure Laterality Date    ABLATION OF DYSRHYTHMIC FOCUS  2000    CARPAL TUNNEL RELEASE Right     CATARACT REMOVAL WITH IMPLANT Bilateral     CHOLECYSTECTOMY  2007    COLONOSCOPY      COLONOSCOPY  04/10/2017    tubular adenomo polyp , mod. sigmoid diverticulosis, min. int. hemorrhoids    COLONOSCOPY N/A 3/2/2021    COLONOSCOPY WITH BIOPSY performed by Suzanne Foster MD at [place] [address]  9/25/2013    DILATION AND CURETTAGE  1979    EYE SURGERY      laser    INCONTINENCE SURGERY      Percutaneous nerve stimulation Dr James Amaya 2013     INSERTABLE CARDIAC MONITOR  12/04/2015    [employer] REVEAL LINQ MODEL #[id number] MRI CONDTIONAL OK 3T.  IMMEDIATELY POST IMPLANT    OTHER SURGICAL HISTORY  09/10/15    removal of interstim    NC COLSC FLX W/REMOVAL LESION BY HOT BX FORCEPS N/A 4/10/2017    COLONOSCOPY POLYPECTOMY HOT BIOPSY performed by Mahesh Goff MD at [facility]      TYMPANOPLASTY      TYMPANOPLASTY      TYMPANOSTOMY TUBE PLACEMENT  08/21/2017    UPPER GASTROINTESTINAL ENDOSCOPY  03/2016    Dr Anika Coles N/A 3/2/2021    EGD BIOPSY performed by Suzanne Foster MD at [facility] ENDO       Allergies   Allergen Reactions    Robitussin [Guaifenesin] Anaphylaxis    Codeine Swelling    Compazine [Prochlorperazine Maleate] Hives and Swelling    Iodides Itching    Morphine Other (See Comments)     hallucinations    Moxifloxacin      Other reaction(s): Intolerance-unknown  Other reaction(s): Intolerance-unknown    Reglan [Metoclopramide] Nausea Only    Avelox [Moxifloxacin Hcl In Nacl] Rash     Dermatitis      Cipro Xr Rash     dermatitis    Sulfa Antibiotics Rash         Current Outpatient Medications:     LANTUS SOLOSTAR 100 UNIT/ML injection pen, INJECT 42 UNITS INTO THE SKIN 2 TIMES DAILY , Disp: 15 mL, Rfl: 3    ONETOUCH ULTRA strip, TEST TWO (2) TO THREE (3) TIMES A DAY& AS NEEDED , Disp: 100 each, Rfl: 0    atorvastatin (LIPITOR) 40 MG tablet, Take 1 tablet by mouth daily, Disp: 90 tablet, Rfl: 3    oxyCODONE-acetaminophen (PERCOCET) 5-325 MG per tablet, Take 1 tablet by mouth See Admin Instructions for 30 days. Intended supply: 30 days.  1 tab 3-4 times a day prn, Disp: 100 tablet, Rfl: 0    ferrous sulfate (IRON 325) 325 (65 Fe) MG tablet, TAKE 1 TAB BY MOUTH EVERY MORNING , Disp: 90 tablet, Rfl: 5    citalopram (CELEXA) 40 MG tablet, TAKE 1 2 TABLET BY MOUTH TWICE A DAY (Patient taking differently: Take 20 mg by mouth 2 times daily TAKE 1/ 2 TABLET BY MOUTH TWICE A DAY), Disp: 90 tablet, Rfl: 3    carvedilol (COREG) 3.125 MG tablet, TAKE 1 TAB BY MOUTH TWICE A DAY ( IN THE MORNING AND BEDTIME ) , Disp: 180 tablet, Rfl: 3    losartan (COZAAR) 25 MG tablet, TAKE 1 TAB BY MOUTH ONCE A DAY ( IN THE MORNING ), Disp: 90 tablet, Rfl: 5    magnesium oxide (MAG-OX) 400 MG tablet, TAKE 1 TAB BY MOUTH TWICE A DAY ( IN THE MORNING AND BEDTIME ), Disp: 180 tablet, Rfl: 3    topiramate (TOPAMAX) 100 MG tablet, Take 1 tablet by mouth nightly, Disp: 90 tablet, Rfl: 2    Icosapent Ethyl (VASCEPA) 1 g CAPS capsule, Take 1 capsule by mouth 2 times daily, Disp: 60 capsule, Rfl: 3    vitamin B-12 (CYANOCOBALAMIN) 100 MCG tablet, Take 50 mcg by mouth daily, Disp: , Rfl:     isosorbide dinitrate (ISORDIL) 30 MG tablet, Take 30 mg by mouth, Disp: , Rfl:     metFORMIN (GLUCOPHAGE) 1000 MG tablet, TAKE 1 TAB BY MOUTH TWICE A MG tablet, TAKE 1 TAB BY MOUTH ONCE A DAY ( IN THE MORNING ) -THIS MEDICINE MAY BE TAKENWITH OR WITHOUT FOOD , Disp: 90 tablet, Rfl: 1    zoster recombinant adjuvanted vaccine (SHINGRIX) 50 MCG/0.5ML SUSR injection, 50 MCG IM then repeat 2-6 months., Disp: 0.5 mL, Rfl: 1    ULTICARE MINI PEN NEEDLES 31G X 6 MM MISC, USE AS DIRECTED , Disp: 100 each, Rfl: 5    nystatin (MYCOSTATIN) 214163 UNIT/GM powder, Apply 3 times daily. , Disp: 3 Bottle, Rfl: 3    lidocaine (LMX) 4 % cream, Apply topically every 8 hrs as needed for pain, Disp: 45 g, Rfl: 3    Lift Chair MISC, by Does not apply route, Disp: 1 each, Rfl: 0    Misc.  Devices (ADJUST BATH/SHOWER SEAT/BACK) MISC, Use daily for shower, Disp: 1 each, Rfl: 0    Alcohol Swabs (B-D SINGLE USE SWABS REGULAR) PADS, THREE TIMES A DAY, Disp: 100 each, Rfl: 0    nitroGLYCERIN (NITROSTAT) 0.4 MG SL tablet, 1 under the tongue as needed for angina, may repeat q5mins for up three doses, Disp: , Rfl:     Blood Glucose Monitoring Suppl [facility], Use daily to check BS, Disp: 1 Device, Rfl: 0    ipratropium-albuterol (DUONEB) 0.5-2.5 (3) MG/3ML SOLN nebulizer solution, Inhale 3 mLs into the lungs every 4 hours, Disp: 360 mL, Rfl: 2    Melatonin 10 MG TABS, Take 10 mg by mouth nightly, Disp: 90 tablet, Rfl: 3    Compression Stockings MISC, by Does not apply route Pressure between 20 - 25 ., Disp: 1 each, Rfl: 0    docusate sodium (COLACE) 100 MG capsule, Take 1 capsule by mouth 2 times daily Please use while taking Percocet, Disp: 30 capsule, Rfl: 0    OXYGEN, Inhale 3 L into the lungs , Disp: , Rfl:     Family History   Problem Relation Age of Onset    Diabetes Mother     Heart Disease Mother     Stomach Cancer Father     Diabetes Maternal Grandmother         also aunts and uncles (maternal)    Emphysema Sister        Social History     Socioeconomic History    Marital status: Legally      Spouse name: Not on file    Number of children: Not on file    Years of education: Not on file    Highest education level: Not on file   Occupational History    Occupation: disabled     Employer: N/A   Social Needs    Financial resource strain: Not on file    Food insecurity     Worry: Not on file     Inability: Not on file   [employer] needs     Medical: Not on file     Non-medical: Not on file   Tobacco Use    Smoking status: Former Smoker     Packs/day: 3.00     Years: 41.00     Pack years: 123.00     Types: Cigarettes     Quit date: 2000     Years since quittin.2    Smokeless tobacco: Never Used    Tobacco comment: quit in    Substance and Sexual Activity    Alcohol use: No     Alcohol/week: 0.0 standard drinks     Frequency: Never    Drug use: No    Sexual activity: Not Currently   Lifestyle    Physical activity     Days per week: Not on file     Minutes per session: Not on file    Stress: Not on file   Relationships    Social connections     Talks on phone: Not on file     Gets together: Not on file     Attends Yazidi service: Not on file     Active member of club or organization: Not on file     Attends meetings of clubs or organizations: Not on file     Relationship status: Not on file    Intimate partner violence     Fear of current or ex partner: Not on file     Emotionally abused: Not on file     Physically abused: Not on file     Forced sexual activity: Not on file   Other Topics Concern    Not on file   Social History Narrative    Not on file         Review of Systems:  Review of Systems   Constitution: Negative. HENT: Positive for hearing loss. Hearing aids   Eyes: Negative. Cardiovascular: Negative. Respiratory:        Uses oxygen   Endocrine:        Diabetic, blood sugar 145   Skin: Negative. Musculoskeletal: Positive for back pain and joint pain. Hips   Gastrointestinal: Negative. Genitourinary: Negative. Neurological: Positive for weakness. Psychiatric/Behavioral: Negative.           Physical Exam:  LMP  (LMP Unknown)     Physical Exam  Skin:         Neurological:      Mental Status: She is alert and oriented to person, place, and time. Psychiatric:         Mood and Affect: Mood normal.         Thought Content: Thought content normal.           Assessment:      Problem List Items Addressed This Visit     Spondylarthrosis - Primary    Sacroiliitis, not elsewhere classified (HCC) (Chronic)    Polyneuropathy, unspecified    Osteoarthritis of cervical spine (Chronic)    Medication monitoring encounter (Chronic)    Lumbosacral spondylosis without myelopathy (Chronic)    Lumbar radiculopathy, chronic    DDD (degenerative disc disease), lumbar    DDD (degenerative disc disease), cervical (Chronic)    Chronic, continuous use of opioids            Treatment Plan:  DISCUSSION: Treatment options discussed withpatient and all questions answered to patient's satisfaction. Possible side effects, risk of tolerance and or dependence and alternative treatments discussed    Obtaining appropriate analgesic effect of treatment   No signs of potential drug abuse or diversion identified    [x] Ill effects of being on chronic pain medications such as sleep disturbances, respiratory depression, hormonal changes, withdrawal symptoms, chronic opioid dependence and tolerance as well as risk of taking opioids with Benzodiazepines and taking opioids along with alcohol,  werediscussed with patient. I had asked the patient to minimize medication use and utilize pain medications only for uncontrolled rest pain or pain with exertional activities. I advised patient not to self-escalate painmedications without consulting with us. At each of patient's future visits we will try to taper pain medications, while adjusting the adjunct medications, and re-evaluating for Physical Therapy to improve spinal andjoint strength. We will continue to have discussions to decrease pain medications as tolerated.       Counseled patient on effects their pain medication and /or their medical condition mayhave on their  ability to drive or operate machinery. Instructed not to drive or operate machinery if drowsy     I also discussed with the patient regarding the dangers of combining narcotic pain medication with tranquilizers, alcohol or illegal drugs or taking the medication any way other than prescribed. The dangers were discussed  including respiratory depression and death. Patient was told to tell  all  physicians regarding the medications he is getting from pain clinic. Patient is warned not to take any unprescribed medications over-the-countermedications that can depress breathing . Patient is advised to talk to the pharmacist or physicians if planning to take any over-the-counter medications before  takeing them. Patient is strongly advised to avoid tranquilizers or  relaxants, illegal drugs  or any medications that can depress breathing  Patient is also advised to tell us if there is any changes in their medications from other physicians.             TREATMENT OPTIONS:       Medication Agreement Requirements Met  Continue Opioid therapy  Script written for  percocet  Follow up appointment made

## 2021-04-05 ENCOUNTER — HOSPITAL ENCOUNTER (OUTPATIENT)
Age: 71
Setting detail: OBSERVATION
Discharge: HOME HEALTH | End: 2021-04-06
Attending: EMERGENCY MEDICINE | Admitting: EMERGENCY MEDICINE
Payer: COMMERCIAL

## 2021-04-05 ENCOUNTER — APPOINTMENT (OUTPATIENT)
Dept: GENERAL RADIOLOGY | Age: 71
End: 2021-04-05
Payer: COMMERCIAL

## 2021-04-05 ENCOUNTER — APPOINTMENT (OUTPATIENT)
Dept: CT IMAGING | Age: 71
End: 2021-04-05
Payer: COMMERCIAL

## 2021-04-05 DIAGNOSIS — R07.9 CHEST PAIN, UNSPECIFIED TYPE: Primary | ICD-10-CM

## 2021-04-05 LAB
ABSOLUTE EOS #: 0.19 K/UL (ref 0–0.44)
ABSOLUTE IMMATURE GRANULOCYTE: 0.04 K/UL (ref 0–0.3)
ABSOLUTE LYMPH #: 2.14 K/UL (ref 1.1–3.7)
ABSOLUTE MONO #: 0.82 K/UL (ref 0.1–1.2)
ALBUMIN SERPL-MCNC: 3.7 G/DL (ref 3.5–5.2)
ALBUMIN/GLOBULIN RATIO: 1.2 (ref 1–2.5)
ALP BLD-CCNC: 59 U/L (ref 35–104)
ALT SERPL-CCNC: 29 U/L (ref 5–33)
ANION GAP SERPL CALCULATED.3IONS-SCNC: 9 MMOL/L (ref 9–17)
AST SERPL-CCNC: 31 U/L
BASOPHILS # BLD: 1 % (ref 0–2)
BASOPHILS ABSOLUTE: 0.05 K/UL (ref 0–0.2)
BILIRUB SERPL-MCNC: 0.21 MG/DL (ref 0.3–1.2)
BNP INTERPRETATION: NORMAL
BUN BLDV-MCNC: 19 MG/DL (ref 8–23)
BUN/CREAT BLD: ABNORMAL (ref 9–20)
CALCIUM SERPL-MCNC: 8.7 MG/DL (ref 8.6–10.4)
CHLORIDE BLD-SCNC: 98 MMOL/L (ref 98–107)
CO2: 26 MMOL/L (ref 20–31)
CREAT SERPL-MCNC: 1.04 MG/DL (ref 0.5–0.9)
D-DIMER QUANTITATIVE: 0.53 MG/L FEU
DIFFERENTIAL TYPE: ABNORMAL
EOSINOPHILS RELATIVE PERCENT: 2 % (ref 1–4)
GFR AFRICAN AMERICAN: >60 ML/MIN
GFR NON-AFRICAN AMERICAN: 52 ML/MIN
GFR SERPL CREATININE-BSD FRML MDRD: ABNORMAL ML/MIN/{1.73_M2}
GFR SERPL CREATININE-BSD FRML MDRD: ABNORMAL ML/MIN/{1.73_M2}
GLUCOSE BLD-MCNC: 272 MG/DL (ref 65–105)
GLUCOSE BLD-MCNC: 279 MG/DL (ref 70–99)
GLUCOSE BLD-MCNC: 307 MG/DL (ref 65–105)
GLUCOSE BLD-MCNC: 454 MG/DL (ref 65–105)
HCT VFR BLD CALC: 38.5 % (ref 36.3–47.1)
HEMOGLOBIN: 12.3 G/DL (ref 11.9–15.1)
IMMATURE GRANULOCYTES: 1 %
LYMPHOCYTES # BLD: 26 % (ref 24–43)
MCH RBC QN AUTO: 31.1 PG (ref 25.2–33.5)
MCHC RBC AUTO-ENTMCNC: 31.9 G/DL (ref 28.4–34.8)
MCV RBC AUTO: 97.5 FL (ref 82.6–102.9)
MONOCYTES # BLD: 10 % (ref 3–12)
NRBC AUTOMATED: 0 PER 100 WBC
PDW BLD-RTO: 12.8 % (ref 11.8–14.4)
PLATELET # BLD: 162 K/UL (ref 138–453)
PLATELET ESTIMATE: ABNORMAL
PMV BLD AUTO: 10.9 FL (ref 8.1–13.5)
POTASSIUM SERPL-SCNC: 4.2 MMOL/L (ref 3.7–5.3)
PRO-BNP: 188 PG/ML
RBC # BLD: 3.95 M/UL (ref 3.95–5.11)
RBC # BLD: ABNORMAL 10*6/UL
SEG NEUTROPHILS: 60 % (ref 36–65)
SEGMENTED NEUTROPHILS ABSOLUTE COUNT: 5.11 K/UL (ref 1.5–8.1)
SODIUM BLD-SCNC: 133 MMOL/L (ref 135–144)
TOTAL PROTEIN: 6.7 G/DL (ref 6.4–8.3)
TROPONIN INTERP: NORMAL
TROPONIN INTERP: NORMAL
TROPONIN T: NORMAL NG/ML
TROPONIN T: NORMAL NG/ML
TROPONIN, HIGH SENSITIVITY: 6 NG/L (ref 0–14)
TROPONIN, HIGH SENSITIVITY: 6 NG/L (ref 0–14)
WBC # BLD: 8.4 K/UL (ref 3.5–11.3)
WBC # BLD: ABNORMAL 10*3/UL

## 2021-04-05 PROCEDURE — 96375 TX/PRO/DX INJ NEW DRUG ADDON: CPT

## 2021-04-05 PROCEDURE — 85025 COMPLETE CBC W/AUTO DIFF WBC: CPT

## 2021-04-05 PROCEDURE — 6370000000 HC RX 637 (ALT 250 FOR IP): Performed by: STUDENT IN AN ORGANIZED HEALTH CARE EDUCATION/TRAINING PROGRAM

## 2021-04-05 PROCEDURE — 80053 COMPREHEN METABOLIC PANEL: CPT

## 2021-04-05 PROCEDURE — 99285 EMERGENCY DEPT VISIT HI MDM: CPT

## 2021-04-05 PROCEDURE — 83880 ASSAY OF NATRIURETIC PEPTIDE: CPT

## 2021-04-05 PROCEDURE — G0378 HOSPITAL OBSERVATION PER HR: HCPCS

## 2021-04-05 PROCEDURE — 96376 TX/PRO/DX INJ SAME DRUG ADON: CPT

## 2021-04-05 PROCEDURE — 71045 X-RAY EXAM CHEST 1 VIEW: CPT

## 2021-04-05 PROCEDURE — 96374 THER/PROPH/DIAG INJ IV PUSH: CPT

## 2021-04-05 PROCEDURE — 84484 ASSAY OF TROPONIN QUANT: CPT

## 2021-04-05 PROCEDURE — 2500000003 HC RX 250 WO HCPCS: Performed by: STUDENT IN AN ORGANIZED HEALTH CARE EDUCATION/TRAINING PROGRAM

## 2021-04-05 PROCEDURE — 2580000003 HC RX 258: Performed by: STUDENT IN AN ORGANIZED HEALTH CARE EDUCATION/TRAINING PROGRAM

## 2021-04-05 PROCEDURE — 71260 CT THORAX DX C+: CPT

## 2021-04-05 PROCEDURE — 93005 ELECTROCARDIOGRAM TRACING: CPT | Performed by: STUDENT IN AN ORGANIZED HEALTH CARE EDUCATION/TRAINING PROGRAM

## 2021-04-05 PROCEDURE — 82947 ASSAY GLUCOSE BLOOD QUANT: CPT

## 2021-04-05 PROCEDURE — 6360000002 HC RX W HCPCS: Performed by: STUDENT IN AN ORGANIZED HEALTH CARE EDUCATION/TRAINING PROGRAM

## 2021-04-05 PROCEDURE — 6360000004 HC RX CONTRAST MEDICATION: Performed by: STUDENT IN AN ORGANIZED HEALTH CARE EDUCATION/TRAINING PROGRAM

## 2021-04-05 PROCEDURE — 85379 FIBRIN DEGRADATION QUANT: CPT

## 2021-04-05 RX ORDER — METHYLPREDNISOLONE SODIUM SUCCINATE 125 MG/2ML
40 INJECTION, POWDER, LYOPHILIZED, FOR SOLUTION INTRAMUSCULAR; INTRAVENOUS ONCE
Status: COMPLETED | OUTPATIENT
Start: 2021-04-05 | End: 2021-04-05

## 2021-04-05 RX ORDER — DICYCLOMINE HYDROCHLORIDE 10 MG/1
10 CAPSULE ORAL 2 TIMES DAILY PRN
Status: DISCONTINUED | OUTPATIENT
Start: 2021-04-05 | End: 2021-04-06 | Stop reason: HOSPADM

## 2021-04-05 RX ORDER — ACETAMINOPHEN 500 MG
1000 TABLET ORAL ONCE
Status: COMPLETED | OUTPATIENT
Start: 2021-04-05 | End: 2021-04-05

## 2021-04-05 RX ORDER — CLOPIDOGREL BISULFATE 75 MG/1
75 TABLET ORAL ONCE
Status: COMPLETED | OUTPATIENT
Start: 2021-04-05 | End: 2021-04-05

## 2021-04-05 RX ORDER — LIDOCAINE HYDROCHLORIDE 20 MG/ML
15 SOLUTION OROPHARYNGEAL ONCE
Status: COMPLETED | OUTPATIENT
Start: 2021-04-05 | End: 2021-04-05

## 2021-04-05 RX ORDER — NITROGLYCERIN 0.4 MG/1
0.4 TABLET SUBLINGUAL EVERY 5 MIN PRN
Status: DISCONTINUED | OUTPATIENT
Start: 2021-04-05 | End: 2021-04-05 | Stop reason: SDUPTHER

## 2021-04-05 RX ORDER — FUROSEMIDE 20 MG/1
20 TABLET ORAL DAILY
Status: DISCONTINUED | OUTPATIENT
Start: 2021-04-06 | End: 2021-04-06 | Stop reason: HOSPADM

## 2021-04-05 RX ORDER — ATORVASTATIN CALCIUM 40 MG/1
40 TABLET, FILM COATED ORAL DAILY
Status: DISCONTINUED | OUTPATIENT
Start: 2021-04-06 | End: 2021-04-06 | Stop reason: HOSPADM

## 2021-04-05 RX ORDER — OXYCODONE HYDROCHLORIDE AND ACETAMINOPHEN 5; 325 MG/1; MG/1
1 TABLET ORAL SEE ADMIN INSTRUCTIONS
Status: DISCONTINUED | OUTPATIENT
Start: 2021-04-05 | End: 2021-04-05

## 2021-04-05 RX ORDER — PHENOL 1.4 %
10 AEROSOL, SPRAY (ML) MUCOUS MEMBRANE NIGHTLY
Status: DISCONTINUED | OUTPATIENT
Start: 2021-04-05 | End: 2021-04-05

## 2021-04-05 RX ORDER — INSULIN GLARGINE 100 [IU]/ML
50 INJECTION, SOLUTION SUBCUTANEOUS 2 TIMES DAILY
Status: DISCONTINUED | OUTPATIENT
Start: 2021-04-05 | End: 2021-04-06 | Stop reason: HOSPADM

## 2021-04-05 RX ORDER — ALBUTEROL SULFATE 2.5 MG/3ML
2.5 SOLUTION RESPIRATORY (INHALATION) EVERY 6 HOURS PRN
Status: DISCONTINUED | OUTPATIENT
Start: 2021-04-05 | End: 2021-04-06 | Stop reason: HOSPADM

## 2021-04-05 RX ORDER — ACETAMINOPHEN 325 MG/1
650 TABLET ORAL EVERY 4 HOURS PRN
Status: DISCONTINUED | OUTPATIENT
Start: 2021-04-05 | End: 2021-04-06 | Stop reason: HOSPADM

## 2021-04-05 RX ORDER — MAGNESIUM HYDROXIDE/ALUMINUM HYDROXICE/SIMETHICONE 120; 1200; 1200 MG/30ML; MG/30ML; MG/30ML
30 SUSPENSION ORAL ONCE
Status: COMPLETED | OUTPATIENT
Start: 2021-04-05 | End: 2021-04-05

## 2021-04-05 RX ORDER — CARVEDILOL 3.12 MG/1
3.12 TABLET ORAL DAILY
Status: DISCONTINUED | OUTPATIENT
Start: 2021-04-06 | End: 2021-04-06 | Stop reason: HOSPADM

## 2021-04-05 RX ORDER — OXYCODONE HYDROCHLORIDE AND ACETAMINOPHEN 5; 325 MG/1; MG/1
1 TABLET ORAL 3 TIMES DAILY PRN
Status: DISCONTINUED | OUTPATIENT
Start: 2021-04-05 | End: 2021-04-06 | Stop reason: HOSPADM

## 2021-04-05 RX ORDER — NITROGLYCERIN 0.4 MG/1
0.4 TABLET SUBLINGUAL EVERY 5 MIN PRN
Status: DISCONTINUED | OUTPATIENT
Start: 2021-04-05 | End: 2021-04-06 | Stop reason: HOSPADM

## 2021-04-05 RX ORDER — SODIUM CHLORIDE 9 MG/ML
25 INJECTION, SOLUTION INTRAVENOUS PRN
Status: DISCONTINUED | OUTPATIENT
Start: 2021-04-05 | End: 2021-04-06 | Stop reason: HOSPADM

## 2021-04-05 RX ORDER — CITALOPRAM 20 MG/1
20 TABLET ORAL 2 TIMES DAILY
Status: DISCONTINUED | OUTPATIENT
Start: 2021-04-05 | End: 2021-04-06 | Stop reason: HOSPADM

## 2021-04-05 RX ORDER — PANTOPRAZOLE SODIUM 40 MG/1
40 TABLET, DELAYED RELEASE ORAL 2 TIMES DAILY
Status: DISCONTINUED | OUTPATIENT
Start: 2021-04-05 | End: 2021-04-06 | Stop reason: HOSPADM

## 2021-04-05 RX ORDER — LANOLIN ALCOHOL/MO/W.PET/CERES
325 CREAM (GRAM) TOPICAL
Status: DISCONTINUED | OUTPATIENT
Start: 2021-04-06 | End: 2021-04-06 | Stop reason: HOSPADM

## 2021-04-05 RX ORDER — KETOROLAC TROMETHAMINE 15 MG/ML
15 INJECTION, SOLUTION INTRAMUSCULAR; INTRAVENOUS ONCE
Status: COMPLETED | OUTPATIENT
Start: 2021-04-05 | End: 2021-04-05

## 2021-04-05 RX ORDER — TOPIRAMATE 100 MG/1
100 TABLET, FILM COATED ORAL NIGHTLY
Status: DISCONTINUED | OUTPATIENT
Start: 2021-04-05 | End: 2021-04-06 | Stop reason: HOSPADM

## 2021-04-05 RX ORDER — LOSARTAN POTASSIUM 50 MG/1
50 TABLET ORAL DAILY
Status: DISCONTINUED | OUTPATIENT
Start: 2021-04-06 | End: 2021-04-06 | Stop reason: HOSPADM

## 2021-04-05 RX ORDER — ONDANSETRON 2 MG/ML
4 INJECTION INTRAMUSCULAR; INTRAVENOUS EVERY 8 HOURS PRN
Status: DISCONTINUED | OUTPATIENT
Start: 2021-04-05 | End: 2021-04-06 | Stop reason: HOSPADM

## 2021-04-05 RX ORDER — SODIUM CHLORIDE 0.9 % (FLUSH) 0.9 %
10 SYRINGE (ML) INJECTION PRN
Status: DISCONTINUED | OUTPATIENT
Start: 2021-04-05 | End: 2021-04-06 | Stop reason: HOSPADM

## 2021-04-05 RX ORDER — SODIUM CHLORIDE 0.9 % (FLUSH) 0.9 %
10 SYRINGE (ML) INJECTION EVERY 12 HOURS SCHEDULED
Status: DISCONTINUED | OUTPATIENT
Start: 2021-04-05 | End: 2021-04-06 | Stop reason: HOSPADM

## 2021-04-05 RX ORDER — DOCUSATE SODIUM 100 MG/1
100 CAPSULE, LIQUID FILLED ORAL 2 TIMES DAILY
Status: DISCONTINUED | OUTPATIENT
Start: 2021-04-05 | End: 2021-04-06 | Stop reason: HOSPADM

## 2021-04-05 RX ORDER — UBIDECARENONE 75 MG
50 CAPSULE ORAL DAILY
Status: DISCONTINUED | OUTPATIENT
Start: 2021-04-06 | End: 2021-04-06 | Stop reason: HOSPADM

## 2021-04-05 RX ORDER — ASPIRIN 81 MG/1
81 TABLET, CHEWABLE ORAL DAILY
Status: DISCONTINUED | OUTPATIENT
Start: 2021-04-06 | End: 2021-04-06 | Stop reason: HOSPADM

## 2021-04-05 RX ORDER — DIPHENHYDRAMINE HYDROCHLORIDE 50 MG/ML
50 INJECTION INTRAMUSCULAR; INTRAVENOUS ONCE
Status: COMPLETED | OUTPATIENT
Start: 2021-04-05 | End: 2021-04-05

## 2021-04-05 RX ADMIN — LIDOCAINE HYDROCHLORIDE 15 ML: 20 SOLUTION ORAL; TOPICAL at 20:03

## 2021-04-05 RX ADMIN — Medication 10 MG: at 23:14

## 2021-04-05 RX ADMIN — TOPIRAMATE 100 MG: 100 TABLET, FILM COATED ORAL at 23:13

## 2021-04-05 RX ADMIN — KETOROLAC TROMETHAMINE 15 MG: 15 INJECTION, SOLUTION INTRAMUSCULAR; INTRAVENOUS at 18:50

## 2021-04-05 RX ADMIN — MAGNESIUM GLUCONATE 500 MG ORAL TABLET 400 MG: 500 TABLET ORAL at 23:14

## 2021-04-05 RX ADMIN — METHYLPREDNISOLONE SODIUM SUCCINATE 40 MG: 125 INJECTION, POWDER, FOR SOLUTION INTRAMUSCULAR; INTRAVENOUS at 18:54

## 2021-04-05 RX ADMIN — DIPHENHYDRAMINE HYDROCHLORIDE 50 MG: 50 INJECTION, SOLUTION INTRAMUSCULAR; INTRAVENOUS at 17:57

## 2021-04-05 RX ADMIN — IOPAMIDOL 75 ML: 755 INJECTION, SOLUTION INTRAVENOUS at 19:06

## 2021-04-05 RX ADMIN — KETOROLAC TROMETHAMINE 15 MG: 15 INJECTION, SOLUTION INTRAMUSCULAR; INTRAVENOUS at 15:44

## 2021-04-05 RX ADMIN — NITROGLYCERIN 0.4 MG: 0.4 TABLET SUBLINGUAL at 15:44

## 2021-04-05 RX ADMIN — INSULIN GLARGINE 50 UNITS: 100 INJECTION, SOLUTION SUBCUTANEOUS at 23:14

## 2021-04-05 RX ADMIN — ALUMINUM HYDROXIDE, MAGNESIUM HYDROXIDE, AND SIMETHICONE 30 ML: 200; 200; 20 SUSPENSION ORAL at 20:03

## 2021-04-05 RX ADMIN — METHYLPREDNISOLONE SODIUM SUCCINATE 40 MG: 125 INJECTION, POWDER, FOR SOLUTION INTRAMUSCULAR; INTRAVENOUS at 15:35

## 2021-04-05 RX ADMIN — FAMOTIDINE 20 MG: 10 INJECTION, SOLUTION INTRAVENOUS at 20:03

## 2021-04-05 RX ADMIN — ACETAMINOPHEN 1000 MG: 500 TABLET ORAL at 15:44

## 2021-04-05 RX ADMIN — CLOPIDOGREL 75 MG: 75 TABLET, FILM COATED ORAL at 23:14

## 2021-04-05 RX ADMIN — NITROGLYCERIN 0.4 MG: 0.4 TABLET SUBLINGUAL at 18:18

## 2021-04-05 RX ADMIN — PANTOPRAZOLE SODIUM 40 MG: 40 TABLET, DELAYED RELEASE ORAL at 23:21

## 2021-04-05 RX ADMIN — CITALOPRAM 20 MG: 20 TABLET, FILM COATED ORAL at 23:14

## 2021-04-05 RX ADMIN — SODIUM CHLORIDE, PRESERVATIVE FREE 10 ML: 5 INJECTION INTRAVENOUS at 23:13

## 2021-04-05 ASSESSMENT — ENCOUNTER SYMPTOMS
SORE THROAT: 0
BACK PAIN: 0
CONSTIPATION: 0
DIARRHEA: 0
VOMITING: 0
CHEST TIGHTNESS: 0
TROUBLE SWALLOWING: 0
NAUSEA: 1
PHOTOPHOBIA: 0
ABDOMINAL DISTENTION: 0
RHINORRHEA: 0
SHORTNESS OF BREATH: 1
ABDOMINAL PAIN: 0
WHEEZING: 0

## 2021-04-05 ASSESSMENT — PAIN SCALES - GENERAL
PAINLEVEL_OUTOF10: 8
PAINLEVEL_OUTOF10: 9

## 2021-04-05 ASSESSMENT — PAIN DESCRIPTION - DESCRIPTORS: DESCRIPTORS: CONSTANT;CRUSHING

## 2021-04-05 NOTE — ED PROVIDER NOTES
[facility] [address] [facility]     Emergency Department     Faculty Attestation    I performed a history and physical examination of the patient and discussed management with the resident. I reviewed the residents note and agree with the documented findings and plan of care. Any areas of disagreement are noted on the chart. I was personally present for the key portions of any procedures. I have documented in the chart those procedures where I was not present during the key portions. I have reviewed the emergency nurses triage note. I agree with the chief complaint, past medical history, past surgical history, allergies, medications, social and family history as documented unless otherwise noted below. For Physician Assistant/ Nurse Practitioner cases/documentation I have personally evaluated this patient and have completed at least one if not all key elements of the E/M (history, physical exam, and MDM). Additional findings are as noted. This patient was evaluated in the Emergency Department for symptoms described in the history of present illness. He/she was evaluated in the context of the global COVID-19 pandemic, which necessitated consideration that the patient might be at risk for infection with the SARS-CoV-2 virus that causes COVID-19. Institutional protocols and algorithms that pertain to the evaluation of patients at risk for COVID-19 are in a state of rapid change based on information released by regulatory bodies including the CDC and federal and state organizations. These policies and algorithms were followed during the patient's care in the ED. Patient with shortness of breath that began this morning. No issues over the weekend. After the shortness of breath began developed mid upper back pain sharp but nonradiating initially no ripping tearing or migrating feeling. Now also has anterior chest pain. No radiation to the arms or the jaw. Pain has improved at this point but still present. Denies history of this in the past.  Uses CPAP at night no oxygen but did feel better with supplemental oxygen this morning. On exam patient comfortable watching TV. Chest nontender heart normal lungs clear equal radial pulses bilaterally no calf tenderness no edema abdomen is obese but soft nontender no pulsatile mass.   Will check labs UA chest x-ray plan for CT chest      EKG interpretation sinus rhythm 77 normal intervals normal axis no acute ST or T changes normal EKG    Critical Care     none    Daria Dandy, MD, [facility]  Attending Emergency  Physician             Daria Dandy, MD  04/05/21 [id number]

## 2021-04-05 NOTE — ED NOTES
Bed: 27  Expected date:   Expected time:   Means of arrival:   Comments:  FITO [id number] Bakari Osborne RN  04/05/21 [id number]

## 2021-04-05 NOTE — ED PROVIDER NOTES
[facility]  ED  Emergency Department Encounter  Emergency Medicine Resident     Pt Name: Danielle Infante  MRN: [id number]  Rozinagfceline 1950  Date of evaluation: 4/5/21  PCP:  Cassia Anton MD    [address] [place] [address]       Chief Complaint   Patient presents with    Shortness of Breath       HISTORY OFPRESENT ILLNESS  (Location/Symptom, Timing/Onset, Context/Setting, Quality, Duration, Modifying [place].)      Danielle Infante is a 69 y.o. female with history of atrial fibrillation for which she takes Plavix, as well as a history of coronary artery disease who presents with concerns for acute onset shortness of breath as well as midsternal chest pain rating to the back as well as back pain rating to the mid sternum which started approximately 45 minutes ago. Is states that she was at rest when she noticed a sharp feeling in her chest, states it was accompanied with nausea with no vomiting as well as generalized fatigue. patient states that she never had chest pain like this before in the past.  Patient puts her pain at 10 out of 10 intensity, states that she normally does not require supplemental antioxidant required at at the onset of the symptoms. Patient also endorses worsening bilateral lower extremity swelling. Patient denies  vomiting, change in bowel or bladder function.   Patient states the pain is not made worse with exertion or not made worse    PAST MEDICAL / SURGICAL / SOCIAL / FAMILY HISTORY      has a past medical history of Abdominal bloating, Adenomatous polyp of ascending colon, Allergic rhinitis, Anxiety, Arthritis, Asthma, Atrial fibrillation (HCC), Back pain, Benign hypertension with CKD (chronic kidney disease) stage III, CAD (coronary artery disease), Caffeine use, CHF (congestive heart failure) ([address] Chronic kidney disease, CKD (chronic kidney disease) stage 3, GFR 30-59 ml/min, COPD (chronic obstructive pulmonary disease) ([address] Degeneration of lumbar or lumbosacral intervertebral disc, Depression, DM (diabetes mellitus) ([facility] Emphysema of lung ([address] Gastritis, GERD (gastroesophageal reflux disease), Hearing loss, Hematuria, Hiatal hernia, HTN (hypertension), benign, Hypertriglyceridemia, Incontinence of urine, Irritable bowel syndrome with both constipation and diarrhea, Knee arthropathy, Lumbosacral spondylosis without myelopathy, Migraine headache, Mumps, Neuropathy, Obesity, On home oxygen therapy, HIGINIO on CPAP, Otitis media, Secondary diabetes mellitus with stage 3 chronic kidney disease (GFR 30-59) ([facility] Stroke ([address] Tinnitus, Type 2 diabetes mellitus without complication ([facility] Type II or unspecified type diabetes mellitus without mention of complication, not stated as uncontrolled, UTI (lower urinary tract infection), and Varicose vein. has a past surgical history that includes Cholecystectomy (2007); Carpal tunnel release (Right); Tympanoplasty; Dilation & curettage (1979); Cystocopy (9/25/2013); Cataract removal with implant (Bilateral); Tonsillectomy; Incontinence surgery; ablation of dysrhythmic focus (2000); Upper gastrointestinal endoscopy (03/2016); eye surgery; Tubal ligation; other surgical history (09/10/15); Insertable Cardiac Monitor (12/04/2015); Tympanoplasty; Colonoscopy; Colonoscopy (04/10/2017); pr colsc flx w/removal lesion by hot bx forceps (N/A, 4/10/2017); Tympanostomy tube placement (08/21/2017); Upper gastrointestinal endoscopy (N/A, 3/2/2021); and Colonoscopy (N/A, 3/2/2021).      Social History     Socioeconomic History    Marital status: Legally      Spouse name: Not on file    Number of children: Not on file    Years of education: Not on file    Highest education level: Not on file   Occupational History    Occupation: disabled     Employer: N/A   Social Needs    Financial resource strain: Not on file    Food insecurity     Worry: Not on file     Inability: Not on file    Transportation needs     Medical: Not on file     Non-medical: Not on file   Tobacco Use    Smoking status: Former Smoker     Packs/day: 3.00     Years: 41.00     Pack years: 123.00     Types: Cigarettes     Quit date: 2000     Years since quittin.2    Smokeless tobacco: Never Used    Tobacco comment: quit in    Substance and Sexual Activity    Alcohol use: No     Alcohol/week: 0.0 standard drinks     Frequency: Never    Drug use: No    Sexual activity: Not Currently   Lifestyle    Physical activity     Days per week: Not on file     Minutes per session: Not on file    Stress: Not on file   Relationships    Social connections     Talks on phone: Not on file     Gets together: Not on file     Attends [employer] service: Not on file     Active member of club or organization: Not on file     Attends meetings of clubs or organizations: Not on file     Relationship status: Not on file    Intimate partner violence     Fear of current or ex partner: Not on file     Emotionally abused: Not on file     Physically abused: Not on file     Forced sexual activity: Not on file   Other Topics Concern    Not on file   Social History Narrative    Not on file       Family History   Problem Relation Age of Onset    Diabetes Mother     Heart Disease Mother     Stomach Cancer Father     Diabetes Maternal Grandmother         also aunts and uncles (maternal)    Emphysema Sister         Allergies:  Robitussin [guaifenesin], Codeine, Compazine [prochlorperazine maleate], Iodides, Morphine, Moxifloxacin, Reglan [metoclopramide], Avelox [moxifloxacin hcl in nacl], Cipro xr, and Sulfa antibiotics    Home Medications:  Prior to Admission medications    Medication Sig Start Date End Date Taking?  Authorizing Provider   Blood Glucose Monitoring Suppl (ONE TOUCH ULTRA 2) w/Device KIT  2/10/21   Historical Provider, MD   ofloxacin (OCUFLOX) 0.3 % solution  3/16/21   Historical Provider, MD   oxyCODONE-acetaminophen (PERCOCET) 5-325 MG per tablet Take 1 tablet by mouth See Admin Instructions for 30 days. Intended supply: 30 days. 1 tab 3-4 times a day prn 3/31/21 4/30/21  VERONIKA Jack - CNP   LANTUS SOLOSTAR 100 UNIT/ML injection pen INJECT 42 UNITS INTO THE SKIN 2 TIMES DAILY  3/16/21   Yue Herzog MD   ONETOUCH ULTRA strip TEST TWO (2) TO THREE (3) TIMES A DAY& AS NEEDED  3/5/21   Yue Herzog MD   insulin aspart (NOVOLOG FLEXPEN) 100 UNIT/ML injection pen IF <139 - NO INSULIN; 140-199-2 UNITS;200-249-4 UNITS;250-299-6 UNITS;300-349-8 UNITS;350-400=10 UNITS;ABOVE 400-12 UNITS 3/3/21   Yue Herzog MD   atorvastatin (LIPITOR) 40 MG tablet Take 1 tablet by mouth daily 2/28/21   Yue Herzog MD   Multiple Vitamins-Minerals (THERAPEUTIC MULTIVITAMIN-MINERALS) tablet Take 1 tablet by mouth daily Takes Women over 50 Complete Vitamin daily ([employer] brand)    Historical Provider, MD   blood glucose monitor strips Test 2-3 times a day & as needed for symptoms of irregular blood glucose.  Please fill for freestyle test strips 2/4/21   Yue Herzog MD   BiPAP Machine MISC repair/replace Bipap maintain 18/9CMH2O, Cflex=3,mask,tubing,filters,headgear,heated humidifier,all supplies PRN 1/28/21   Nick Mcknight MD   Psyllium (METAMUCIL PO) Take by mouth 3 times daily Takes powder    Historical Provider, MD   blood glucose test strips (TRUE METRIX BLOOD GLUCOSE TEST) strip THREE TIMES A DAY 1/21/21   Yue Herzog MD   ferrous sulfate (IRON 325) 325 (65 Fe) MG tablet TAKE 1 TAB BY MOUTH EVERY MORNING  1/15/21   Yue Herzog MD   citalopram (CELEXA) 40 MG tablet TAKE 1 2 TABLET BY MOUTH TWICE A DAY  Patient taking differently: Take 20 mg by mouth 2 times daily TAKE 1/ 2 TABLET BY MOUTH TWICE A DAY 1/15/21   Yue Herzog MD   carvedilol (COREG) 3.125 MG tablet TAKE 1 TAB BY MOUTH TWICE A DAY ( IN THE MORNING AND BEDTIME )  1/15/21   Yue Herzog MD   losartan (COZAAR) 25 MG tablet TAKE 1 TAB BY MOUTH ONCE A DAY ( IN THE MORNING ) 1/15/21   [place] Lat Does not apply route 9/24/19   Adrienne Fung MD   Misc. Devices (ADJUST BATH/SHOWER SEAT/BACK) MISC Use daily for shower 9/24/19   Adrienne Fung MD   pantoprazole (PROTONIX) 40 MG tablet Take 1 tablet by mouth 2 times daily 3/15/19   Koleen Lobe, DO   Alcohol Swabs (B-D SINGLE USE SWABS REGULAR) PADS THREE TIMES A DAY 12/12/18   Adrienne Fung MD   nitroGLYCERIN (NITROSTAT) 0.4 MG SL tablet 1 under the tongue as needed for angina, may repeat q5mins for up three doses 8/28/18   Historical Provider, MD   Blood Glucose Monitoring Suppl HARMEET Use daily to check BS 3/27/18   Adrienne Fung MD   ipratropium-albuterol (DUONEB) 0.5-2.5 (3) MG/3ML SOLN nebulizer solution Inhale 3 mLs into the lungs every 4 hours 2/6/18   Reese Reynoso MD   Melatonin 10 MG TABS Take 10 mg by mouth nightly 10/19/17   Adrienne Fung MD   Compression Stockings MISC by Does not apply route Pressure between 20 - 25 . 9/11/17   Adrienne Fung MD   docusate sodium (COLACE) 100 MG capsule Take 1 capsule by mouth 2 times daily Please use while taking Percocet 9/10/15   Keshav Meehan MD   SYMBICORT 160-4.5 MCG/ACT AERO   2 puffs As needed for sob 5/28/15   Historical Provider, MD   OXYGEN Inhale 3 L into the lungs     Historical Provider, MD       REVIEW OFSYSTEMS    (2-9 systems for level 4, 10 or more for level 5)      Review of Systems   Constitutional: Positive for chills and fatigue. Negative for fever. HENT: Negative for hearing loss, rhinorrhea, sneezing, sore throat, tinnitus and trouble swallowing. Eyes: Negative for photophobia and visual disturbance. Respiratory: Positive for shortness of breath. Negative for chest tightness and wheezing. Cardiovascular: Positive for chest pain. Negative for palpitations. Gastrointestinal: Positive for nausea. Negative for abdominal distention, abdominal pain, constipation, diarrhea and vomiting. Endocrine: Negative for polyuria.    Genitourinary: Negative for dysuria, flank pain, frequency, vaginal bleeding and vaginal discharge. Musculoskeletal: Negative for arthralgias, back pain, gait problem and neck pain. Neurological: Negative for dizziness, tremors, seizures, syncope, facial asymmetry, numbness and headaches. Psychiatric/Behavioral: Negative for self-injury and suicidal ideas. PHYSICAL EXAM   (up to 7 for level 4, 8 or more forlevel 5)      INITIAL VITALS:   ED Triage Vitals   BP Temp Temp src Pulse Resp SpO2 Height Weight   -- -- -- -- -- -- -- --       Physical Exam  Constitutional:       Appearance: She is obese. She is not ill-appearing or diaphoretic. HENT:      Head: Normocephalic and atraumatic. Right Ear: External ear normal.      Left Ear: External ear normal.      Nose: No rhinorrhea. Mouth/Throat:      Mouth: Mucous membranes are moist.   Eyes:      Extraocular Movements: Extraocular movements intact. Conjunctiva/sclera: Conjunctivae normal.   Cardiovascular:      Rate and Rhythm: Normal rate and regular rhythm. Pulmonary:      Effort: Pulmonary effort is normal. No respiratory distress. Breath sounds: No decreased breath sounds. Abdominal:      General: Abdomen is flat. Musculoskeletal: Normal range of motion. General: No tenderness or signs of injury. Right lower leg: Edema (+1) present. Left lower leg: Edema (+1) present. Skin:     General: Skin is warm. Capillary Refill: Capillary refill takes less than 2 seconds. Neurological:      Mental Status: She is alert and oriented to person, place, and time. Mental status is at baseline.          DIFFERENTIAL  DIAGNOSIS     PLAN (LABS / IMAGING / EKG):  Orders Placed This Encounter   Procedures    XR CHEST PORTABLE    CT CHEST PULMONARY EMBOLISM W CONTRAST    CBC WITH AUTO DIFFERENTIAL    COMPREHENSIVE METABOLIC PANEL    Troponin    D-DIMER, QUANTITATIVE    Brain Natriuretic Peptide    Troponin    POC Glucose Fingerstick    POC Glucose Fingerstick    EKG 12 Lead    PATIENT STATUS (FROM ED OR OR/PROCEDURAL) Observation       MEDICATIONS ORDERED:  Orders Placed This Encounter   Medications    methylPREDNISolone sodium (SOLU-MEDROL) injection 40 mg    diphenhydrAMINE (BENADRYL) injection 50 mg    methylPREDNISolone sodium (SOLU-MEDROL) injection 40 mg    acetaminophen (TYLENOL) tablet 1,000 mg    nitroGLYCERIN (NITROSTAT) SL tablet 0.4 mg    ketorolac (TORADOL) injection 15 mg    ketorolac (TORADOL) injection 15 mg    iopamidol (ISOVUE-370) 76 % injection 75 mL    aluminum & magnesium hydroxide-simethicone (MAALOX) 200-200-20 MG/5ML suspension 30 mL    lidocaine viscous hcl (XYLOCAINE) 2 % solution 15 mL    famotidine (PEPCID) injection 20 mg       DDX: Angina, myocardial infarction, unstable angina, heart attack, pulmonary embolism, aortic dissection, pneumonia, pneumonitis, gastroesophageal reflux disease, costochondritis    Initial MDM/Plan/ED COURSE:    69 y.o. female who presents with new onset chest pain which she feels is different from her previous episodes of chest pain. Plate serial troponins are assess for myocardial event, plan to treat patient with Toradol in the emergency department for management of pain, patient already pretreated with aspirin, nitroglycerin prior to arrival.  Plan to get a CT PE as patient describes her pain as midsternal radiating to the back as well as back pain radiating forward with concerns for potential PE versus dissection. Plan to assess her wide mediastinum in the acute setting with a chest x-ray plan to treat to treat patient with Solu-Medrol, Benadryl to get a CT PE.    ED Course as of Apr 05 2136   Mon Apr 05, 2021   1443 20-gauge IV placed in the right forearm    [GP]   1649 This is troponin VI, patient's D-dimer is negative based on age adjustment however with concerns for potential PE versus aortic dissection plan to get a CT scan first patient is currently being pretreated.    Troponin, High Sensitivity: 6 [GP]      ED Course User Index  [GP] Georgia Freeman MD    :   Patient's work-up grossly negative for acute pathology, patient still endorsing chest pain, persevered with Maalox and Pepcid however patient does have concerns for an elevated heart score based on patient's comorbidities, age, plan to admit patient observation unit for cardiology evaluation as well as for pain management overnight. DIAGNOSTIC RESULTS / EMERGENCYDEPARTMENT COURSE / MDM     LABS:  Labs Reviewed   CBC WITH AUTO DIFFERENTIAL - Abnormal; Notable for the following components:       Result Value    Immature Granulocytes 1 (*)     All other components within normal limits   COMPREHENSIVE METABOLIC PANEL - Abnormal; Notable for the following components:    Glucose 279 (*)     CREATININE 1.04 (*)     Sodium 133 (*)     Total Bilirubin 0.21 (*)     GFR Non- 52 (*)     All other components within normal limits   POC GLUCOSE FINGERSTICK - Abnormal; Notable for the following components:    POC Glucose 272 (*)     All other components within normal limits   POC GLUCOSE FINGERSTICK - Abnormal; Notable for the following components:    POC Glucose 307 (*)     All other components within normal limits   TROPONIN   D-DIMER, QUANTITATIVE   BRAIN NATRIURETIC PEPTIDE   TROPONIN           Xr Chest Portable    Result Date: 4/5/2021  EXAMINATION: ONE XRAY VIEW OF THE CHEST 4/5/2021 3:12 pm COMPARISON: 10/30/2020 HISTORY: ORDERING SYSTEM PROVIDED HISTORY: midsternal chest pain radiating to back TECHNOLOGIST PROVIDED HISTORY: midsternal chest pain radiating to back Acuity: Unknown Type of Exam: Unknown FINDINGS: Single portable frontal view of the chest is submitted for review. The cardiac silhouette is borderline prominent and there is mild central vascular congestion. No definite focal airspace consolidation, sizeable pleural effusion or pneumothorax. Loop recorder projects overlying the left hemithorax.   Trachea is midline. Visualized osseous structures and soft tissues are grossly intact. Borderline cardiomegaly. No acute cardiopulmonary pathology. Ct Chest Pulmonary Embolism W Contrast    Result Date: 4/5/2021  EXAMINATION: CTA OF THE CHEST 4/5/2021 6:50 pm TECHNIQUE: CTA of the chest was performed after the administration of intravenous contrast.  Multiplanar reformatted images are provided for review. MIP images are provided for review. Dose modulation, iterative reconstruction, and/or weight based adjustment of the mA/kV was utilized to reduce the radiation dose to as low as reasonably achievable. COMPARISON: 03/09/2021 HISTORY: ORDERING SYSTEM PROVIDED HISTORY: concern for PE vs dissection TECHNOLOGIST PROVIDED HISTORY: concern for PE vs dissection Decision Support Exception->Emergency Medical Condition (MA) Reason for Exam: concern for PE vs dissection Acuity: Acute Type of Exam: Initial FINDINGS: Pulmonary Arteries: Pulmonary arteries are adequately opacified for evaluation. No evidence of intraluminal filling defect to suggest pulmonary embolism. Main pulmonary artery is dilated to 3.5 cm. Mediastinum: No evidence of mediastinal lymphadenopathy. The heart and pericardium demonstrate no acute abnormality. There is no acute abnormality of the thoracic aorta. Coronary artery atherosclerosis. Lungs/pleura: There is no pleural effusion or pneumothorax. There is medial atelectasis in the right upper lobe, similar to prior examination. No new pulmonary consolidation. 8 mm noncalcified right middle lobe nodule is unchanged. Upper Abdomen: No acute abnormality within the visualized upper abdomen. Small hiatal hernia. Soft Tissues/Bones: No acute bone or soft tissue abnormality. 1. No pulmonary embolus. 2. Dilation of the main pulmonary artery, concerning for pulmonary artery hypertension.  3. 8 mm right middle lobe nodule is unchanged in the interval.         EKG  Normal sinus, normal rate, normal axis, rate of 77, no ST elevation or depressions, nonspecific EKG. All EKG's are interpreted by the Emergency Department Physicianwho either signs or Co-signs this chart in the absence of a cardiologist.      PROCEDURES:  None    CONSULTS:  IP CONSULT TO CARDIOLOGY    CRITICAL CARE:  Please see attending note    FINAL IMPRESSION      1. Chest pain, unspecified type         DISPOSITION / PLAN     DISPOSITION Admitted 04/05/2021 08:41:03 PM      PATIENT REFERRED TO:  No follow-up provider specified.     DISCHARGE MEDICATIONS:  New Prescriptions    No medications on file       Thu Holloway MD  Emergency Medicine Resident    (Please note that portions of this note were completed with a voice recognition program.Efforts were made to edit the dictations but occasionally words are mis-transcribed.)        Thu Holloway MD  Resident  04/05/21 4050

## 2021-04-05 NOTE — ED TRIAGE NOTES
Pt to ED cc CP/SOB that started around 1300, states she became extremely SOB after ambulation, pt states CP is mid-sternal and radiates from her upper middle back to the front of her chest, and states that it feel likes someone is sitting on her chest. Pt also states she is N/V, denies any emesis. Pt placed on continuous cardiac monitoring, BP, Pulse ox. EKG obtained. IV established and labs drawn. Pt alert and oriented x4, talking in complete sentences, respirations even and unlabored. Pt acting age appropriate.  White board updated, will continue to plan of care

## 2021-04-05 NOTE — ED PROVIDER NOTES
[address] HANDOFF       Handoff taken on the following patient from prior Attending Physician:  Pt Name: Viri Rojas  PCP:  Kvng Eubanks MD    Attestation  I was available and discussed any additional care issues that arose and coordinated the management plans with the resident(s) caring for the patient during my duty period. Any areas of disagreement with resident's documentation of care or procedures are noted on the chart. I was personally present for the key portions of any/all procedures during my duty period. I have documented in the chart those procedures where I was not present during the key portions. CHIEF COMPLAINT       Chief Complaint   Patient presents with    Shortness of Breath         CURRENT MEDICATIONS     Previous Medications  Previous Medications    ALBUTEROL (PROVENTIL) (2.5 MG/3ML) 0.083% NEBULIZER SOLUTION    Take 3 mLs by nebulization every 6 hours as needed for Wheezing    ALCOHOL SWABS (B-D SINGLE USE SWABS REGULAR) PADS    THREE TIMES A DAY    ASPIRIN 81 MG CHEWABLE TABLET    Take 1 tablet by mouth daily    ATORVASTATIN (LIPITOR) 40 MG TABLET    Take 1 tablet by mouth daily    BIPAP MACHINE MISC    repair/replace Bipap maintain 18/9CMH2O, Cflex=3,mask,tubing,filters,headgear,heated humidifier,all supplies PRN    BLOOD GLUCOSE MONITOR STRIPS    Test 2-3 times a day & as needed for symptoms of irregular blood glucose.  Please fill for freestyle test strips    BLOOD GLUCOSE MONITORING SUPPL (ONE TOUCH ULTRA 2) W/DEVICE KIT        BLOOD GLUCOSE MONITORING SUPPL HARMEET    Use daily to check BS    BLOOD GLUCOSE TEST STRIPS (TRUE METRIX BLOOD GLUCOSE TEST) STRIP    THREE TIMES A DAY    CARVEDILOL (COREG) 3.125 MG TABLET    TAKE 1 TAB BY MOUTH TWICE A DAY ( IN THE MORNING AND BEDTIME )     CITALOPRAM (CELEXA) 40 MG TABLET    TAKE 1 2 TABLET BY MOUTH TWICE A DAY    CLOPIDOGREL (PLAVIX) 75 MG TABLET    TAKE 1 TAB BY MOUTH ONCE A DAY ( IN THE MORNING ) -THIS MEDICINE MAY BE TAKENWITH OR WITHOUT FOOD     COMPRESSION STOCKINGS MISC    by Does not apply route Pressure between 20 - 25 . DICYCLOMINE (BENTYL) 10 MG CAPSULE    Take 1 capsule by mouth 2 times daily as needed (abdominal spasm)    DOCUSATE SODIUM (COLACE) 100 MG CAPSULE    Take 1 capsule by mouth 2 times daily Please use while taking Percocet    FERROUS SULFATE (IRON 325) 325 (65 FE) MG TABLET    TAKE 1 TAB BY MOUTH EVERY MORNING     FUROSEMIDE (LASIX) 20 MG TABLET    TAKE 1 TAB BY MOUTH ONCE A DAY AS NEEDED ( WEIGHT GAIN 3 LBS OVERNIGHT )     GABAPENTIN (NEURONTIN) 300 MG CAPSULE    Take 1 capsule by mouth 3 times daily for 30 days. ICOSAPENT ETHYL (VASCEPA) 1 G CAPS CAPSULE    Take 1 capsule by mouth 2 times daily    INSULIN ASPART (NOVOLOG FLEXPEN) 100 UNIT/ML INJECTION PEN    IF <139 - NO INSULIN; 140-199-2 UNITS;200-249-4 UNITS;250-299-6 UNITS;300-349-8 UNITS;350-400=10 UNITS;ABOVE 400-12 UNITS    IPRATROPIUM-ALBUTEROL (DUONEB) 0.5-2.5 (3) MG/3ML SOLN NEBULIZER SOLUTION    Inhale 3 mLs into the lungs every 4 hours    ISOSORBIDE DINITRATE (ISORDIL) 30 MG TABLET    Take 30 mg by mouth    LANTUS SOLOSTAR 100 UNIT/ML INJECTION PEN    INJECT 42 UNITS INTO THE SKIN 2 TIMES DAILY     LIDOCAINE (LMX) 4 % CREAM    Apply topically every 8 hrs as needed for pain    LIFT CHAIR MISC    by Does not apply route    LOSARTAN (COZAAR) 25 MG TABLET    TAKE 1 TAB BY MOUTH ONCE A DAY ( IN THE MORNING )    MAGNESIUM OXIDE (MAG-OX) 400 MG TABLET    TAKE 1 TAB BY MOUTH TWICE A DAY ( IN THE MORNING AND BEDTIME )    MELATONIN 10 MG TABS    Take 10 mg by mouth nightly    METFORMIN (GLUCOPHAGE) 1000 MG TABLET    TAKE 1 TAB BY MOUTH TWICE A DAY ( IN THE MORNING AND BEDTIME )     MISC.  DEVICES (ADJUST BATH/SHOWER SEAT/BACK) MISC    Use daily for shower    MULTIPLE VITAMINS-MINERALS (THERAPEUTIC MULTIVITAMIN-MINERALS) TABLET    Take 1 tablet by mouth daily Takes Women over 50 Complete Vitamin daily ([employer] brand) NITROGLYCERIN (NITROSTAT) 0.4 MG SL TABLET    1 under the tongue as needed for angina, may repeat q5mins for up three doses    NYSTATIN (MYCOSTATIN) 553717 UNIT/GM POWDER    Apply 3 times daily. OFLOXACIN (OCUFLOX) 0.3 % SOLUTION        ONETOUCH ULTRA STRIP    TEST TWO (2) TO THREE (3) TIMES A DAY& AS NEEDED     OXYCODONE-ACETAMINOPHEN (PERCOCET) 5-325 MG PER TABLET    Take 1 tablet by mouth See Admin Instructions for 30 days. Intended supply: 30 days. 1 tab 3-4 times a day prn    OXYGEN    Inhale 3 L into the lungs     PANTOPRAZOLE (PROTONIX) 40 MG TABLET    Take 1 tablet by mouth 2 times daily    PSYLLIUM (METAMUCIL PO)    Take by mouth 3 times daily Takes powder    SYMBICORT 160-4.5 MCG/ACT AERO      2 puffs As needed for sob    TOPIRAMATE (TOPAMAX) 100 MG TABLET    Take 1 tablet by mouth nightly    ULTICARE MINI PEN NEEDLES 31G X 6 MM MISC    USE AS DIRECTED     VITAMIN B-12 (CYANOCOBALAMIN) 100 MCG TABLET    Take 50 mcg by mouth daily    ZOSTER RECOMBINANT ADJUVANTED VACCINE (SHINGRIX) 50 MCG/0.5ML SUSR INJECTION    50 MCG IM then repeat 2-6 months. Encounter Medications  Orders Placed This Encounter   Medications    methylPREDNISolone sodium (SOLU-MEDROL) injection 40 mg    diphenhydrAMINE (BENADRYL) injection 50 mg    methylPREDNISolone sodium (SOLU-MEDROL) injection 40 mg    acetaminophen (TYLENOL) tablet 1,000 mg    nitroGLYCERIN (NITROSTAT) SL tablet 0.4 mg    ketorolac (TORADOL) injection 15 mg       ALLERGIES     is allergic to robitussin [guaifenesin]; codeine; compazine [prochlorperazine maleate]; iodides; morphine; moxifloxacin; reglan [metoclopramide]; avelox [moxifloxacin hcl in nacl]; cipro xr; and sulfa antibiotics. RECENT VITALS:   Temp: 99 °F (37.2 °C),  Pulse: 72, Resp: 16, BP: 127/63    RADIOLOGY:   XR CHEST PORTABLE   Final Result   Borderline cardiomegaly. No acute cardiopulmonary pathology.          CT CHEST PULMONARY EMBOLISM W CONTRAST    (Results Pending) LABS:  Labs Reviewed   CBC WITH AUTO DIFFERENTIAL - Abnormal; Notable for the following components:       Result Value    Immature Granulocytes 1 (*)     All other components within normal limits   COMPREHENSIVE METABOLIC PANEL - Abnormal; Notable for the following components:    Glucose 279 (*)     CREATININE 1.04 (*)     Sodium 133 (*)     Total Bilirubin 0.21 (*)     GFR Non- 52 (*)     All other components within normal limits   POC GLUCOSE FINGERSTICK - Abnormal; Notable for the following components:    POC Glucose 272 (*)     All other components within normal limits   D-DIMER, QUANTITATIVE   BRAIN NATRIURETIC PEPTIDE   TROPONIN   TROPONIN           PLAN/ TASKS OUTSTANDING     This patient is a 69 y.o. Female. 48-year-old female with presentation concerning for dissection. Chest pain and a positive differential.  Allergic to contrast.  Chest x-ray unchanged, PE study ordered but needs premedicated due to a contrast allergy.   Patient will likely need to be admitted due to her chest pain    (Please note that portions of this note were completed with a voice recognition program.  Efforts were made to edit the dictations but occasionally words are mis-transcribed.)    Langford MD  Attending Emergency Physician       Keysha Valencia MD  04/05/21 0847

## 2021-04-05 NOTE — ED NOTES
Dr Liz Whitaker and Dr. Sergo Herrera at bedside to evaluate pt, Dr. Sergo Herrera attempting Dianelys Lau RN  04/05/21 1839

## 2021-04-05 NOTE — ED PROVIDER NOTES
[facility]  ED  Emergency Department  Senior Resident Attestation     Primary Care Physician  Gage Montelongo MD    I performed a history and physical examination of the patient and discussed management with the priscilla resident. I reviewed the priscilla residents note and agree with the documented findings and plan of care. Any areas of disagreement are noted on the chart. Case was then discussed with Faculty Attending Supervisor for additional medical management.     PERTINENT ATTENDING PHYSICIAN COMMENTS:    HISTORY:   Phyllis Rodriguez is a 69 y.o. female who  has a past medical history of Abdominal bloating (1/26/2021), Adenomatous polyp of ascending colon (1/26/2021), Allergic rhinitis, Anxiety (7/17/2013), Arthritis, Asthma, Atrial fibrillation ([address] Back pain, Benign hypertension with CKD (chronic kidney disease) stage III, CAD (coronary artery disease) (3/21/2013), Caffeine use, CHF (congestive heart failure) ([address] Chronic kidney disease, CKD (chronic kidney disease) stage 3, GFR 30-59 ml/min, COPD (chronic obstructive pulmonary disease) ([address] Degeneration of lumbar or lumbosacral intervertebral disc, Depression, DM (diabetes mellitus) ([address] Emphysema of lung ([address] Gastritis, GERD (gastroesophageal reflux disease), Hearing loss, Hematuria, Hiatal hernia, HTN (hypertension), benign (6/28/2014), Hypertriglyceridemia, Incontinence of urine (9/25/2013), Irritable bowel syndrome with both constipation and diarrhea (1/26/2021), Knee arthropathy, Lumbosacral spondylosis without myelopathy (9/29/2014), Migraine headache, Mumps, Neuropathy, Obesity, On home oxygen therapy, HIGINIO on CPAP, Otitis media (1-26-15), Secondary diabetes mellitus with stage 3 chronic kidney disease (GFR 30-59) ([address] Stroke ([address] Tinnitus, Type 2 diabetes mellitus without complication ([address] Type II or unspecified type diabetes mellitus without mention of complication, not stated as uncontrolled, UTI (lower urinary tract infection), and Varicose vein. and presents with complaint of sudden onset midsternal chest pain shortness of breath 45 minutes prior to arrival.  Patient with associated nausea denies any vomiting, diaphoresis. She does have diffuse abdominal pain which she states is her baseline and unchanged from prior. She denies any headache, vision changes, numbness, weakness, tingling. She denies any history of DVT/PE, recent surgery, mobilization, hormone replacement, hemoptysis, active malignancy. Patient describes chest pain as feeling like someone is \"sitting on her chest\".     PHYSICAL:   Temp: 99 °F (37.2 °C),  Pulse: 78, Resp: 16, BP: (!) 160/57, SpO2: (S) 97 %(3L)  Gen: Non-toxic, Afebrile  Neck: Supple  Cards: Regular rate and rhythm  Pulm: Lung sounds clear to auscultation, talking in full sentences  Abdomen: Soft, non-tender, non-distended, obese  Skin: warm, dry  Extremities: pulses 2+ radial / dorsalis pedis, no clubbing, cyanosis, edema    IMPRESSION:   Chest pain, shortness of breath    PLAN:   Cardiac labs, CT chest rule out PE and also evaluate aorta    CRITICAL CARE TIME:    None    Lucrecia Cazares DO  Senior Resident Physician    (Please note that portions of this note were completed with a voice recognition program.  Efforts were made to edit the dictations but occasionally words are mis-transcribed.)        Lucrecia Cazares DO  Resident  04/05/21 1032

## 2021-04-06 ENCOUNTER — APPOINTMENT (OUTPATIENT)
Dept: NUCLEAR MEDICINE | Age: 71
End: 2021-04-06
Payer: COMMERCIAL

## 2021-04-06 VITALS
DIASTOLIC BLOOD PRESSURE: 70 MMHG | SYSTOLIC BLOOD PRESSURE: 116 MMHG | TEMPERATURE: 97.3 F | OXYGEN SATURATION: 91 % | BODY MASS INDEX: 46.74 KG/M2 | HEIGHT: 62 IN | HEART RATE: 78 BPM | WEIGHT: 254 LBS | RESPIRATION RATE: 18 BRPM

## 2021-04-06 LAB
EKG ATRIAL RATE: 75 BPM
EKG ATRIAL RATE: 77 BPM
EKG P AXIS: 59 DEGREES
EKG P AXIS: 60 DEGREES
EKG P-R INTERVAL: 130 MS
EKG P-R INTERVAL: 150 MS
EKG Q-T INTERVAL: 410 MS
EKG Q-T INTERVAL: 414 MS
EKG QRS DURATION: 66 MS
EKG QRS DURATION: 70 MS
EKG QTC CALCULATION (BAZETT): 462 MS
EKG QTC CALCULATION (BAZETT): 463 MS
EKG R AXIS: -11 DEGREES
EKG R AXIS: -29 DEGREES
EKG T AXIS: 74 DEGREES
EKG T AXIS: 74 DEGREES
EKG VENTRICULAR RATE: 75 BPM
EKG VENTRICULAR RATE: 77 BPM
GLUCOSE BLD-MCNC: 299 MG/DL (ref 65–105)
LV EF: 70 %
LVEF MODALITY: NORMAL
TROPONIN INTERP: NORMAL
TROPONIN T: NORMAL NG/ML
TROPONIN, HIGH SENSITIVITY: <6 NG/L (ref 0–14)

## 2021-04-06 PROCEDURE — 3430000000 HC RX DIAGNOSTIC RADIOPHARMACEUTICAL: Performed by: INTERNAL MEDICINE

## 2021-04-06 PROCEDURE — 93005 ELECTROCARDIOGRAM TRACING: CPT | Performed by: STUDENT IN AN ORGANIZED HEALTH CARE EDUCATION/TRAINING PROGRAM

## 2021-04-06 PROCEDURE — 36415 COLL VENOUS BLD VENIPUNCTURE: CPT

## 2021-04-06 PROCEDURE — 6370000000 HC RX 637 (ALT 250 FOR IP): Performed by: STUDENT IN AN ORGANIZED HEALTH CARE EDUCATION/TRAINING PROGRAM

## 2021-04-06 PROCEDURE — 94660 CPAP INITIATION&MGMT: CPT

## 2021-04-06 PROCEDURE — G0378 HOSPITAL OBSERVATION PER HR: HCPCS

## 2021-04-06 PROCEDURE — A9500 TC99M SESTAMIBI: HCPCS | Performed by: INTERNAL MEDICINE

## 2021-04-06 PROCEDURE — 93017 CV STRESS TEST TRACING ONLY: CPT

## 2021-04-06 PROCEDURE — 6360000002 HC RX W HCPCS: Performed by: INTERNAL MEDICINE

## 2021-04-06 PROCEDURE — 78452 HT MUSCLE IMAGE SPECT MULT: CPT

## 2021-04-06 PROCEDURE — 2580000003 HC RX 258: Performed by: INTERNAL MEDICINE

## 2021-04-06 PROCEDURE — 82947 ASSAY GLUCOSE BLOOD QUANT: CPT

## 2021-04-06 PROCEDURE — 84484 ASSAY OF TROPONIN QUANT: CPT

## 2021-04-06 RX ORDER — SODIUM CHLORIDE 0.9 % (FLUSH) 0.9 %
10 SYRINGE (ML) INJECTION PRN
Status: DISCONTINUED | OUTPATIENT
Start: 2021-04-06 | End: 2021-04-06 | Stop reason: HOSPADM

## 2021-04-06 RX ORDER — AMINOPHYLLINE DIHYDRATE 25 MG/ML
50 INJECTION, SOLUTION INTRAVENOUS PRN
Status: DISCONTINUED | OUTPATIENT
Start: 2021-04-06 | End: 2021-04-06 | Stop reason: ALTCHOICE

## 2021-04-06 RX ORDER — NICOTINE POLACRILEX 4 MG
15 LOZENGE BUCCAL PRN
Status: DISCONTINUED | OUTPATIENT
Start: 2021-04-06 | End: 2021-04-06 | Stop reason: HOSPADM

## 2021-04-06 RX ORDER — SODIUM CHLORIDE 0.9 % (FLUSH) 0.9 %
10 SYRINGE (ML) INJECTION PRN
Status: DISCONTINUED | OUTPATIENT
Start: 2021-04-06 | End: 2021-04-06 | Stop reason: ALTCHOICE

## 2021-04-06 RX ORDER — ATROPINE SULFATE 0.1 MG/ML
0.5 INJECTION INTRAVENOUS EVERY 5 MIN PRN
Status: DISCONTINUED | OUTPATIENT
Start: 2021-04-06 | End: 2021-04-06 | Stop reason: ALTCHOICE

## 2021-04-06 RX ORDER — DEXTROSE MONOHYDRATE 25 G/50ML
12.5 INJECTION, SOLUTION INTRAVENOUS PRN
Status: DISCONTINUED | OUTPATIENT
Start: 2021-04-06 | End: 2021-04-06 | Stop reason: HOSPADM

## 2021-04-06 RX ORDER — SODIUM CHLORIDE 9 MG/ML
500 INJECTION, SOLUTION INTRAVENOUS CONTINUOUS PRN
Status: DISCONTINUED | OUTPATIENT
Start: 2021-04-06 | End: 2021-04-06 | Stop reason: ALTCHOICE

## 2021-04-06 RX ORDER — NITROGLYCERIN 0.4 MG/1
0.4 TABLET SUBLINGUAL EVERY 5 MIN PRN
Status: DISCONTINUED | OUTPATIENT
Start: 2021-04-06 | End: 2021-04-06 | Stop reason: ALTCHOICE

## 2021-04-06 RX ORDER — METOPROLOL TARTRATE 5 MG/5ML
5 INJECTION INTRAVENOUS EVERY 5 MIN PRN
Status: DISCONTINUED | OUTPATIENT
Start: 2021-04-06 | End: 2021-04-06 | Stop reason: ALTCHOICE

## 2021-04-06 RX ORDER — DEXTROSE MONOHYDRATE 50 MG/ML
100 INJECTION, SOLUTION INTRAVENOUS PRN
Status: DISCONTINUED | OUTPATIENT
Start: 2021-04-06 | End: 2021-04-06 | Stop reason: HOSPADM

## 2021-04-06 RX ADMIN — OXYCODONE HYDROCHLORIDE AND ACETAMINOPHEN 1 TABLET: 5; 325 TABLET ORAL at 04:16

## 2021-04-06 RX ADMIN — TETRAKIS(2-METHOXYISOBUTYLISOCYANIDE)COPPER(I) TETRAFLUOROBORATE 15 MILLICURIE: 1 INJECTION, POWDER, LYOPHILIZED, FOR SOLUTION INTRAVENOUS at 10:00

## 2021-04-06 RX ADMIN — SODIUM CHLORIDE, PRESERVATIVE FREE 10 ML: 5 INJECTION INTRAVENOUS at 11:38

## 2021-04-06 RX ADMIN — INSULIN GLARGINE 50 UNITS: 100 INJECTION, SOLUTION SUBCUTANEOUS at 13:37

## 2021-04-06 RX ADMIN — SODIUM CHLORIDE, PRESERVATIVE FREE 10 ML: 5 INJECTION INTRAVENOUS at 11:10

## 2021-04-06 RX ADMIN — REGADENOSON 0.4 MG: 0.08 INJECTION, SOLUTION INTRAVENOUS at 11:37

## 2021-04-06 RX ADMIN — SODIUM CHLORIDE, PRESERVATIVE FREE 10 ML: 5 INJECTION INTRAVENOUS at 10:00

## 2021-04-06 RX ADMIN — TETRAKIS(2-METHOXYISOBUTYLISOCYANIDE)COPPER(I) TETRAFLUOROBORATE 40 MILLICURIE: 1 INJECTION, POWDER, LYOPHILIZED, FOR SOLUTION INTRAVENOUS at 11:41

## 2021-04-06 RX ADMIN — NITROGLYCERIN 0.4 MG: 0.4 TABLET SUBLINGUAL at 08:47

## 2021-04-06 RX ADMIN — SODIUM CHLORIDE, PRESERVATIVE FREE 10 ML: 5 INJECTION INTRAVENOUS at 11:41

## 2021-04-06 ASSESSMENT — PAIN DESCRIPTION - FREQUENCY
FREQUENCY: CONTINUOUS
FREQUENCY: CONTINUOUS

## 2021-04-06 ASSESSMENT — PAIN DESCRIPTION - LOCATION
LOCATION: CHEST
LOCATION: CHEST

## 2021-04-06 ASSESSMENT — PAIN DESCRIPTION - PAIN TYPE
TYPE: ACUTE PAIN
TYPE: ACUTE PAIN

## 2021-04-06 ASSESSMENT — PAIN SCALES - GENERAL
PAINLEVEL_OUTOF10: 8
PAINLEVEL_OUTOF10: 8

## 2021-04-06 ASSESSMENT — PAIN DESCRIPTION - DESCRIPTORS: DESCRIPTORS: CONSTANT;CRUSHING

## 2021-04-06 ASSESSMENT — PAIN DESCRIPTION - ORIENTATION: ORIENTATION: MID

## 2021-04-06 NOTE — ED NOTES
Pt resting on stretcher, no respiratory distress noted, pt updated on plan of care, will continue to monitor, call light in reach.        Caesar Reyez RN  04/05/21 2014

## 2021-04-06 NOTE — ED NOTES
Report called to [facility] nurse, all questions addressed at this time      Marissa Mesa RN  04/05/21 [id number]

## 2021-04-06 NOTE — PLAN OF CARE
Problem: Falls - Risk of:  Goal: Will remain free from falls  4/6/2021 1119 by Sear May RN  Outcome: Ongoing  4/6/2021 0512 by Magdalena Reeves RN  Outcome: Ongoing  Goal: Absence of physical injury  4/6/2021 1119 by Sera May RN  Outcome: Ongoing  4/6/2021 0512 by Magdalena Reeves RN  Outcome: Ongoing     Problem: Skin Integrity:  Goal: Will show no infection signs and symptoms  Outcome: Ongoing  Goal: Absence of new skin breakdown  Outcome: Ongoing

## 2021-04-06 NOTE — ED NOTES
Pt alert and oriented x4, talking in complete sentences, respirations even and unlabored. Pt acting age appropriate.  White board updated, will continue to plan of care       Yvette Quiroz RN  04/05/21 [id number]

## 2021-04-06 NOTE — CONSULTS
Attestation signed by      Attending Physician Statement:    I have discussed the care of  Christy Bhardwaj , including pertinent history and exam findings, with the Cardiology fellow/resident. I have seen and examined the patient and the key elements of all parts of the encounter have been performed by me. I agree with the assessment, plan and orders as documented by the fellow/resident, after I modified exam findings and plan of treatments, and the final version is my approved version of the assessment. Additional Comments:   Atypical CP  Has risk factors  Had long discussion regarding options (watchful waiting, outpatient stress testing, inpatient stress testing)- patient only wants inpatient stress testing.   -will check Lexiscan stress test to rule out ischemia/further risk stratify   -If stress test is negative or low risk plan for d/c home today from cardiac standpoint and follow up with cardiology in 2 weeks. Discussed with patient and nursing. Thank you for allowing me to participate in the care of this patient, please do not hesitate to call if you have any questions. Ervin Pete DO, [facility], [address], [place] [address] [facility]  [email or web address] [employer]  [phone]     [facility] Cardiology    Consult / H&P               Today's Date: 4/6/2021  Patient Name: Christy Bhardwaj  Date of admission: 4/5/2021  2:26 PM  Patient's age: 69 y. o., 1950  Admission Dx: Chest pain [R07.9]    Reason for Consult:  Cardiac evaluation    Requesting Physician: Lizzy Flannery MD    CHIEF COMPLAINT:  CP and SOB    History Obtained From:  patient, electronic medical record    HISTORY OF PRESENT ILLNESS:      The patient is a 69 y.o.  female who is admitted to the hospital for CP and SOB. Onset was yesterday morning after beginning 3L O2. Symptoms have improved since that time. The patient's pain is intermittent.  The patient describes the pain as \"pressing\" pressure on the left side and midsternal chest region without radiation. Pain is reproduced by palpating midsternal region of chest. Patient rates pain as a 10/10 that decreased to 8/10 at this time in intensity. Associated symptoms are: fatigue. Aggravating factors are: laying supine, deep inspiration. Alleviating factors are: nitro. Patient's cardiac risk factors are: advanced age (older than 54 for men, 72 for women), diabetes mellitus, dyslipidemia, family history of premature cardiovascular disease, hypertension, obesity (BMI >= 30 kg/m2), sedentary lifestyle and smoking/ tobacco exposure. Patient's risk factors for DVT/PE: prolonged stay in bed. Previous cardiac testing: echocardiogram, electrocardiogram (ECG) and exercise thallium stress test. Patient is former smoker with 41 pack-years who quit in the 2000s, and has hx of COPD. Reports that she is uncertain if her current SOB/CP greatly resembles past COPD exacerbations.     Past Medical History:   has a past medical history of Abdominal bloating, Adenomatous polyp of ascending colon, Allergic rhinitis, Anxiety, Arthritis, Asthma, Atrial fibrillation ([address] Back pain, Benign hypertension with CKD (chronic kidney disease) stage III, CAD (coronary artery disease), Caffeine use, CHF (congestive heart failure) ([address] Chronic kidney disease, CKD (chronic kidney disease) stage 3, GFR 30-59 ml/min, COPD (chronic obstructive pulmonary disease) (HCC), Degeneration of lumbar or lumbosacral intervertebral disc, Depression, DM (diabetes mellitus) ([address] Emphysema of lung ([address] Gastritis, GERD (gastroesophageal reflux disease), Hearing loss, Hematuria, Hiatal hernia, HTN (hypertension), benign, Hypertriglyceridemia, Incontinence of urine, Irritable bowel syndrome with both constipation and diarrhea, Knee arthropathy, Lumbosacral spondylosis without myelopathy, Migraine headache, Mumps, Neuropathy, Obesity, On home oxygen therapy, HIGINIO on CPAP, Otitis media, Secondary diabetes mellitus with stage 3 chronic kidney disease (GFR 30-59) ([facility] Stroke ([facility] Tinnitus, Type 2 diabetes mellitus without complication ([facility] Type II or unspecified type diabetes mellitus without mention of complication, not stated as uncontrolled, UTI (lower urinary tract infection), and Varicose vein. Past Surgical History:   has a past surgical history that includes Cholecystectomy (2007); Carpal tunnel release (Right); Tympanoplasty; Dilation & curettage (1979); Cystocopy (9/25/2013); Cataract removal with implant (Bilateral); Tonsillectomy; Incontinence surgery; ablation of dysrhythmic focus (2000); Upper gastrointestinal endoscopy (03/2016); eye surgery; Tubal ligation; other surgical history (09/10/15); Insertable Cardiac Monitor (12/04/2015); Tympanoplasty; Colonoscopy; Colonoscopy (04/10/2017); pr colsc flx w/removal lesion by hot bx forceps (N/A, 4/10/2017); Tympanostomy tube placement (08/21/2017); Upper gastrointestinal endoscopy (N/A, 3/2/2021); and Colonoscopy (N/A, 3/2/2021). Home Medications:    Prior to Admission medications    Medication Sig Start Date End Date Taking? Authorizing Provider   ofloxacin (OCUFLOX) 0.3 % solution  3/16/21  Yes Historical Provider, MD   oxyCODONE-acetaminophen (PERCOCET) 5-325 MG per tablet Take 1 tablet by mouth See Admin Instructions for 30 days. Intended supply: 30 days.  1 tab 3-4 times a day prn 3/31/21 4/30/21 Yes VERONIKA Montes - CNP   LANTUS SOLOSTAR 100 UNIT/ML injection pen INJECT 42 UNITS INTO THE SKIN 2 TIMES DAILY  3/16/21  Yes Adrienne Fung MD   insulin aspart (NOVOLOG FLEXPEN) 100 UNIT/ML injection pen IF <139 - NO INSULIN; 140-199-2 UNITS;200-249-4 UNITS;250-299-6 UNITS;300-349-8 UNITS;350-400=10 UNITS;ABOVE 400-12 UNITS 3/3/21  Yes Adrienne Fung MD   atorvastatin (LIPITOR) 40 MG tablet Take 1 tablet by mouth daily 2/28/21  Yes Adrienne Fung MD   Multiple Vitamins-Minerals (THERAPEUTIC MULTIVITAMIN-MINERALS) tablet Take 1 tablet by mouth daily Takes Women over 50 Complete Vitamin daily ([employer] brand)   Yes Historical Provider, MD   Psyllium (METAMUCIL PO) Take by mouth 3 times daily Takes powder   Yes Historical Provider, MD   ferrous sulfate (IRON 325) 325 (65 Fe) MG tablet TAKE 1 TAB BY MOUTH EVERY MORNING  1/15/21  Yes Jorge Chiu MD   carvedilol (COREG) 3.125 MG tablet TAKE 1 TAB BY MOUTH TWICE A DAY ( IN THE MORNING AND BEDTIME )  1/15/21  Yes Jorge Chiu MD   losartan (COZAAR) 25 MG tablet TAKE 1 TAB BY MOUTH ONCE A DAY ( IN THE MORNING ) 1/15/21  Yes Jorge Chiu MD   magnesium oxide (MAG-OX) 400 MG tablet TAKE 1 TAB BY MOUTH TWICE A DAY ( IN THE MORNING AND BEDTIME ) 1/15/21  Yes Jorge Chiu MD   topiramate (TOPAMAX) 100 MG tablet Take 1 tablet by mouth nightly 11/30/20  Yes Adrián Camargo MD   dicyclomine (BENTYL) 10 MG capsule Take 1 capsule by mouth 2 times daily as needed (abdominal spasm) 11/25/20  Yes Jorge Chiu MD   albuterol (PROVENTIL) (2.5 MG/3ML) 0.083% nebulizer solution Take 3 mLs by nebulization every 6 hours as needed for Wheezing 11/3/20  Yes Lexx Holman MD   furosemide (LASIX) 20 MG tablet TAKE 1 TAB BY MOUTH ONCE A DAY AS NEEDED ( WEIGHT GAIN 3 LBS OVERNIGHT )  10/31/20  Yes Jorge Chiu MD   clopidogrel (PLAVIX) 75 MG tablet TAKE 1 TAB BY MOUTH ONCE A DAY ( IN THE MORNING ) -THIS MEDICINE MAY BE TAKENWITH OR WITHOUT FOOD  10/17/20  Yes Jorge Chiu MD   Icosapent Ethyl (VASCEPA) 1 g CAPS capsule Take 1 capsule by mouth 2 times daily 9/14/20  Yes Jorge Chiu MD   vitamin B-12 (CYANOCOBALAMIN) 100 MCG tablet Take 50 mcg by mouth daily   Yes Historical Provider, MD   isosorbide dinitrate (ISORDIL) 30 MG tablet Take 30 mg by mouth   Yes Historical Provider, MD   metFORMIN (GLUCOPHAGE) 1000 MG tablet TAKE 1 TAB BY MOUTH TWICE A DAY ( IN THE MORNING AND BEDTIME )  8/10/20  Yes Jorge Chiu MD   gabapentin (NEURONTIN) 300 MG capsule Take 1 capsule by mouth 3 times daily for 30 days.  7/30/20 4/5/21 Yes Juan Ramon Cameron Zahida, APRN - CNP   nystatin (MYCOSTATIN) 087820 UNIT/GM powder Apply 3 times daily. 6/25/20  Yes Kasie Matamoros MD   aspirin 81 MG chewable tablet Take 1 tablet by mouth daily 12/3/19  Yes Kasie Matamoros MD   lidocaine (LMX) 4 % cream Apply topically every 8 hrs as needed for pain 11/11/19  Yes Kasie Matamoros MD   pantoprazole (PROTONIX) 40 MG tablet Take 1 tablet by mouth 2 times daily 3/15/19  Yes Celine Shukla DO   nitroGLYCERIN (NITROSTAT) 0.4 MG SL tablet 1 under the tongue as needed for angina, may repeat q5mins for up three doses 8/28/18  Yes Historical Provider, MD   ipratropium-albuterol (DUONEB) 0.5-2.5 (3) MG/3ML SOLN nebulizer solution Inhale 3 mLs into the lungs every 4 hours 2/6/18  Yes Roxana Tucker MD   Melatonin 10 MG TABS Take 10 mg by mouth nightly 10/19/17  Yes Kasie Matamoros MD   docusate sodium (COLACE) 100 MG capsule Take 1 capsule by mouth 2 times daily Please use while taking Percocet 9/10/15  Yes Prema Hall MD   SYMBICORT 160-4.5 MCG/ACT AERO   2 puffs As needed for sob 5/28/15  Yes Historical Provider, MD   Blood Glucose Monitoring Suppl (ONE TOUCH ULTRA 2) w/Device KIT  2/10/21   Historical Provider, MD   ONETOUCH ULTRA strip TEST TWO (2) TO THREE (3) TIMES A DAY& AS NEEDED  3/5/21   Kasie Matamoros MD   blood glucose monitor strips Test 2-3 times a day & as needed for symptoms of irregular blood glucose.  Please fill for freestyle test strips 2/4/21   Kasie Matamoros MD   BiPAP Machine MISC repair/replace Bipap maintain 18/9CMH2O, Cflex=3,mask,tubing,filters,headgear,heated humidifier,all supplies PRN 1/28/21   Historical Provider, MD   blood glucose test strips (TRUE METRIX BLOOD GLUCOSE TEST) strip THREE TIMES A DAY 1/21/21   Kasie Matamoros MD   citalopram (CELEXA) 40 MG tablet TAKE 1 2 TABLET BY MOUTH TWICE A DAY  Patient taking differently: Take 20 mg by mouth 2 times daily  1/15/21   Kasie Matamoros MD   zoster recombinant adjuvanted vaccine [facility]) 50 MCG/0.5ML SUSR injection 50 MCG IM then repeat 2-6 months. 9/14/20 9/14/21  Scotty Adler MD   ULTICARE MINI PEN NEEDLES 31G X 6 MM MISC USE AS DIRECTED  8/14/20   Scotty Adler MD   Lift Chair MISC by Does not apply route 9/24/19   Scotty Adler MD   Mis.  Devices (ADJUST BATH/SHOWER SEAT/BACK) MISC Use daily for shower 9/24/19   Scotty Adler MD   Alcohol Swabs (B-D SINGLE USE SWABS REGULAR) PADS THREE TIMES A DAY 12/12/18   Scotty Adler MD   Blood Glucose Monitoring Suppl HARMEET Use daily to check BS 3/27/18   Scotty Adler MD   Compression Stockings MISC by Does not apply route Pressure between 20 - 25 . 9/11/17   Scotty Adler MD   OXYGEN Inhale 3 L into the lungs     Historical Provider, MD      Current Facility-Administered Medications: glucose (GLUTOSE) 40 % oral gel 15 g, 15 g, Oral, PRN  dextrose 50 % IV solution, 12.5 g, Intravenous, PRN  glucagon (rDNA) injection 1 mg, 1 mg, Intramuscular, PRN  dextrose 5 % solution, 100 mL/hr, Intravenous, PRN  insulin lispro (HUMALOG) injection vial 0-18 Units, 0-18 Units, Subcutaneous, TID WC  insulin lispro (HUMALOG) injection vial 0-9 Units, 0-9 Units, Subcutaneous, Nightly  albuterol (PROVENTIL) nebulizer solution 2.5 mg, 2.5 mg, Nebulization, Q6H PRN  aspirin chewable tablet 81 mg, 81 mg, Oral, Daily  atorvastatin (LIPITOR) tablet 40 mg, 40 mg, Oral, Daily  carvedilol (COREG) tablet 3.125 mg, 3.125 mg, Oral, Daily  citalopram (CELEXA) tablet 20 mg, 20 mg, Oral, BID  dicyclomine (BENTYL) capsule 10 mg, 10 mg, Oral, BID PRN  docusate sodium (COLACE) capsule 100 mg, 100 mg, Oral, BID  ferrous sulfate (FE TABS 325) EC tablet 325 mg, 325 mg, Oral, Daily with breakfast  furosemide (LASIX) tablet 20 mg, 20 mg, Oral, Daily  insulin glargine (LANTUS) injection vial 50 Units, 50 Units, Subcutaneous, BID  losartan (COZAAR) tablet 50 mg, 50 mg, Oral, Daily  magnesium oxide (MAG-OX) tablet 400 mg, 400 mg, Oral, BID  nitroGLYCERIN (NITROSTAT) SL tablet 0.4 mg, 0.4 mg, Sublingual, Q5 Min PRN  pantoprazole (PROTONIX) tablet 40 mg, 40 mg, Oral, BID  topiramate (TOPAMAX) tablet 100 mg, 100 mg, Oral, Nightly  vitamin B-12 (CYANOCOBALAMIN) tablet 50 mcg, 50 mcg, Oral, Daily  sodium chloride flush 0.9 % injection 10 mL, 10 mL, Intravenous, 2 times per day  sodium chloride flush 0.9 % injection 10 mL, 10 mL, Intravenous, PRN  0.9 % sodium chloride infusion, 25 mL, Intravenous, PRN  enoxaparin (LOVENOX) injection 40 mg, 40 mg, Subcutaneous, Daily  acetaminophen (TYLENOL) tablet 650 mg, 650 mg, Oral, Q4H PRN  ondansetron (ZOFRAN) injection 4 mg, 4 mg, Intravenous, Q8H PRN  melatonin tablet 10 mg, 10 mg, Oral, Nightly  oxyCODONE-acetaminophen (PERCOCET) 5-325 MG per tablet 1 tablet, 1 tablet, Oral, TID PRN    Allergies:  Robitussin [guaifenesin], Codeine, Compazine [prochlorperazine maleate], Iodides, Morphine, Moxifloxacin, Reglan [metoclopramide], Avelox [moxifloxacin hcl in nacl], Cipro xr, and Sulfa antibiotics    Social History:   reports that she quit smoking about 21 years ago. Her smoking use included cigarettes. She has a 123.00 pack-year smoking history. She has never used smokeless tobacco. She reports that she does not drink alcohol or use drugs. Family History: family history includes Diabetes in her maternal grandmother and mother; Emphysema in her sister; Heart Disease in her mother; Stomach Cancer in her father. No h/o sudden cardiac death. No for premature CAD    REVIEW OF SYSTEMS:    Constitutional: there has been no unanticipated weight loss. There's been No change in energy level, No change in activity level. Eyes: No visual changes or diplopia. No scleral icterus. ENT: No Headaches  Cardiovascular: No cardiac history  Respiratory: No previous pulmonary problems, No cough  Gastrointestinal: No abdominal pain. No change in bowel or bladder habits. Genitourinary: No dysuria, trouble voiding, or hematuria.   Musculoskeletal:  No gait disturbance, No weakness or joint complaints. Integumentary: No rash or pruritis. Neurological: No headache, diplopia, change in muscle strength, numbness or tingling. No change in gait, balance, coordination, mood, affect, memory, mentation, behavior. Psychiatric: No anxiety, or depression. Endocrine: No temperature intolerance. No excessive thirst, fluid intake, or urination. No tremor. Hematologic/Lymphatic: No abnormal bruising or bleeding, blood clots or swollen lymph nodes. Allergic/Immunologic: No nasal congestion or hives. PHYSICAL EXAM:      /70   Pulse 78   Temp 97.3 °F (36.3 °C) (Oral)   Resp 18   Ht 5' 2\" (1.575 m)   Wt 254 lb (115.2 kg)   LMP  (LMP Unknown)   SpO2 91%   BMI 46.46 kg/m²    Constitutional and General Appearance: alert, cooperative, no distress and appears stated age  HEENT: PERRL, no cervical lymphadenopathy. No masses palpable. Normal oral mucosa  Respiratory:  Normal excursion and expansion without use of accessory muscles  Resp Auscultation: Good respiratory effort. No for increased work of breathing. On auscultation: clear to auscultation bilaterally  Cardiovascular: The apical impulse is not displaced  Heart tones are crisp and normal. regular S1 and S2.  Jugular venous pulsation Normal  The carotid upstroke is normal in amplitude and contour without delay or bruit  Peripheral pulses are symmetrical and full   Abdomen:   No masses or tenderness  Bowel sounds present  Extremities:   No Cyanosis or Clubbing   Lower extremity edema: No   Skin: Warm and dry  Neurological:  Alert and oriented. Moves all extremities well  No abnormalities of mood, affect, memory, mentation, or behavior are noted    DATA:    Diagnostics:    EKG: normal EKG, normal sinus rhythm, unchanged from previous tracings. ECHO: obtained and reviewed. Stress Test: obtained and reviewed. Cardiac Angiography: not obtained.     Labs:     CBC:   Recent Labs     04/05/21  1430   WBC 8.4   HGB 12.3   HCT 38.5        BMP: Recent Labs     04/05/21  1430   *   K 4.2   CO2 26   BUN 19   CREATININE 1.04*   LABGLOM 52*   GLUCOSE 279*     BNP: No results for input(s): BNP in the last 72 hours. PT/INR: No results for input(s): PROTIME, INR in the last 72 hours. APTT:No results for input(s): APTT in the last 72 hours. CARDIAC ENZYMES:No results for input(s): CKTOTAL, CKMB, CKMBINDEX, TROPONINI in the last 72 hours.   FASTING LIPID PANEL:  Lab Results   Component Value Date    HDL 37 02/25/2021    TRIG 289 02/25/2021     LIVER PROFILE:  Recent Labs     04/05/21  1430   AST 31   ALT 29   LABALBU 3.7       IMPRESSION:    Patient Active Problem List   Diagnosis    Chronic pain of left knee    Type 2 diabetes mellitus with diabetic polyneuropathy, with long-term current use of insulin (HCC)    Nonintractable migraine, unspecified migraine type    Coronary artery disease due to lipid rich plaque    DDD (degenerative disc disease), lumbar    Chronic, continuous use of opioids    DDD (degenerative disc disease), cervical    Osteoarthritis of cervical spine    Medication monitoring encounter    GERD (gastroesophageal reflux disease)    HTN (hypertension), benign    Mixed hyperlipidemia    Insomnia    Lumbosacral spondylosis without myelopathy    Sacroiliitis, not elsewhere classified ([facility]    Carpal tunnel syndrome    Mixed stress and urge urinary incontinence    Bilateral low back pain with sciatica    Intractable migraine with aura without status migrainosus    Spondylarthrosis    Anxiety and depression    Chronic migraine without aura without status migrainosus, not intractable    Morbid (severe) obesity with alveolar hypoventilation (HCC)    Lung nodule    Neuropathy    Obstructive sleep apnea syndrome    Asthma with COPD ([facility]    Gastroesophageal reflux disease with esophagitis    Morbid obesity with BMI of 40.0-44.9, adult ([facility]    Congestive heart failure (HCC)    Stage 3 chronic kidney disease    Benign hypertension with CKD (chronic kidney disease) stage III    Secondary diabetes mellitus with stage 3 chronic kidney disease (GFR 30-59) (HCC)    CKD (chronic kidney disease) stage 3, GFR 30-59 ml/min    Episode of altered cognition    Lumbar radiculopathy, chronic    Sessile colonic polyp    Gastroesophageal reflux disease    Morbid obesity (HCC)    Adenomatous polyp of ascending colon    Irritable bowel syndrome with both constipation and diarrhea    Abdominal bloating    Long term (current) use of insulin (HCC)    Major depressive disorder, single episode, unspecified    Permanent atrial fibrillation (HCC)    Polyneuropathy, unspecified    Other chronic pain    Opioid use, unspecified, uncomplicated    Chest pain       RECOMMENDATIONS:    Atypical Chest Pain  - Proceed with NM cardiac stress test today  - Continue home meds  - EKG NSR with no significant changes from 04.05.21  - Trops WNL, CXR not indicative of acute cardiopulmonary pathology  - CT Chest PE demonstrated concern for pulmonary a. HTN & 8mm R middle lobe nodule  - Last echo (02.05.20) non-indicative, EF 66%  - Last cardiac stress test (02.02.20) = low risk stratification, calculated EF 68%  - Pain positional in nature and reproduced by deep inspiration and palpation of midsternal chest, suggesting etiology is more musculoskeletal in nature    Discussed with patient and Nurse.     Electronically signed by Narendra Diehl MD on 4/6/2021 at 12:06 PM    [facility]      [phone]

## 2021-04-06 NOTE — ED NOTES
Pt resting on stretcher, no respiratory distress noted, pt updated on plan of care, will continue to monitor, call light in reach.        Xochitl Villaseñor RN  04/05/21 2014

## 2021-04-06 NOTE — ED NOTES
Pt resting on stretcher, no respiratory distress noted, pt updated on plan of care, will continue to monitor, call light in reach.        Mela Pacheco RN  04/05/21 2014

## 2021-04-06 NOTE — ED NOTES
Pt resting on stretcher, no respiratory distress noted, pt updated on plan of care, will continue to monitor, call light in reach.        Cyril Salazar RN  04/05/21 2014

## 2021-04-06 NOTE — ED NOTES
Pt resting on cot, NAD, call light within reach, resp even and non labored. Side rails up x 2. White board updated, plan of care reviewed with patient.      Richi Carter RN  04/05/21 2023

## 2021-04-06 NOTE — CONSULTS
[facility]    Consult / H&P               Today's Date: 4/6/2021  Patient Name: Cade Omalley  Date of admission: 4/5/2021  2:26 PM  Patient's age: 69 y. o., 1950  Admission Dx: Chest pain [R07.9]    Reason for Consult:  Cardiac evaluation    Requesting Physician: Nika Staley MD    CHIEF COMPLAINT:  CP and SOB    History Obtained From:  patient, electronic medical record    HISTORY OF PRESENT ILLNESS:      The patient is a 69 y.o.  female who is admitted to the hospital for CP and SOB. Onset was yesterday morning after beginning 3L O2. Symptoms have improved since that time. The patient's pain is intermittent. The patient describes the pain as \"pressing\" pressure on the left side and midsternal chest region without radiation. Pain is reproduced by palpating midsternal region of chest. Patient rates pain as a 10/10 that decreased to 8/10 at this time in intensity. Associated symptoms are: fatigue. Aggravating factors are: laying supine, deep inspiration. Alleviating factors are: nitro. Patient's cardiac risk factors are: advanced age (older than 54 for men, 72 for women), diabetes mellitus, dyslipidemia, family history of premature cardiovascular disease, hypertension, obesity (BMI >= 30 kg/m2), sedentary lifestyle and smoking/ tobacco exposure. Patient's risk factors for DVT/PE: prolonged stay in bed. Previous cardiac testing: echocardiogram, electrocardiogram (ECG) and exercise thallium stress test. Patient is former smoker with 41 pack-years who quit in the 2000s, and has hx of COPD. Reports that she is uncertain if her current SOB/CP greatly resembles past COPD exacerbations.     Past Medical History:   has a past medical history of Abdominal bloating, Adenomatous polyp of ascending colon, Allergic rhinitis, Anxiety, Arthritis, Asthma, Atrial fibrillation ([address] Back pain, Benign hypertension with CKD (chronic kidney disease) stage III, CAD (coronary artery disease), (PERCOCET) 5-325 MG per tablet Take 1 tablet by mouth See Admin Instructions for 30 days. Intended supply: 30 days.  1 tab 3-4 times a day prn 3/31/21 4/30/21 Yes Vergil Blend, APRN - CNP   LANTUS SOLOSTAR 100 UNIT/ML injection pen INJECT 42 UNITS INTO THE SKIN 2 TIMES DAILY  3/16/21  Yes Bernadette Alatorre MD   insulin aspart (NOVOLOG FLEXPEN) 100 UNIT/ML injection pen IF <139 - NO INSULIN; 140-199-2 UNITS;200-249-4 UNITS;250-299-6 UNITS;300-349-8 UNITS;350-400=10 UNITS;ABOVE 400-12 UNITS 3/3/21  Yes Bernadette Alatorre MD   atorvastatin (LIPITOR) 40 MG tablet Take 1 tablet by mouth daily 2/28/21  Yes Bernadette Alatorre MD   Multiple Vitamins-Minerals (THERAPEUTIC MULTIVITAMIN-MINERALS) tablet Take 1 tablet by mouth daily Takes Women over 50 Complete Vitamin daily ([employer] brand)   Yes Historical Provider, MD   Psyllium (METAMUCIL PO) Take by mouth 3 times daily Takes powder   Yes Historical Provider, MD   ferrous sulfate (IRON 325) 325 (65 Fe) MG tablet TAKE 1 TAB BY MOUTH EVERY MORNING  1/15/21  Yes Bernadette Alatorre MD   carvedilol (COREG) 3.125 MG tablet TAKE 1 TAB BY MOUTH TWICE A DAY ( IN THE MORNING AND BEDTIME )  1/15/21  Yes Bernadette Alatorre MD   losartan (COZAAR) 25 MG tablet TAKE 1 TAB BY MOUTH ONCE A DAY ( IN THE MORNING ) 1/15/21  Yes Bernadette Alatorre MD   magnesium oxide (MAG-OX) 400 MG tablet TAKE 1 TAB BY MOUTH TWICE A DAY ( IN THE MORNING AND BEDTIME ) 1/15/21  Yes Bernadette Alatorre MD   topiramate (TOPAMAX) 100 MG tablet Take 1 tablet by mouth nightly 11/30/20  Yes Mauro Connors MD   dicyclomine (BENTYL) 10 MG capsule Take 1 capsule by mouth 2 times daily as needed (abdominal spasm) 11/25/20  Yes Bernadette Alatorre MD   albuterol (PROVENTIL) (2.5 MG/3ML) 0.083% nebulizer solution Take 3 mLs by nebulization every 6 hours as needed for Wheezing 11/3/20  Yes Swathi Eye, MD   furosemide (LASIX) 20 MG tablet TAKE 1 TAB BY MOUTH ONCE A DAY AS NEEDED ( WEIGHT GAIN 3 LBS OVERNIGHT )  10/31/20  Yes Bernadette Alatorre MD clopidogrel (PLAVIX) 75 MG tablet TAKE 1 TAB BY MOUTH ONCE A DAY ( IN THE MORNING ) -THIS MEDICINE MAY BE TAKENWITH OR WITHOUT FOOD  10/17/20  Yes Tamia Canas MD   Icosapent Ethyl (VASCEPA) 1 g CAPS capsule Take 1 capsule by mouth 2 times daily 9/14/20  Yes Tamia Canas MD   vitamin B-12 (CYANOCOBALAMIN) 100 MCG tablet Take 50 mcg by mouth daily   Yes Historical Provider, MD   isosorbide dinitrate (ISORDIL) 30 MG tablet Take 30 mg by mouth   Yes Historical Provider, MD   metFORMIN (GLUCOPHAGE) 1000 MG tablet TAKE 1 TAB BY MOUTH TWICE A DAY ( IN THE MORNING AND BEDTIME )  8/10/20  Yes Tamia Canas MD   gabapentin (NEURONTIN) 300 MG capsule Take 1 capsule by mouth 3 times daily for 30 days. 7/30/20 4/5/21 Yes VERONIKA Medina - CNP   nystatin (MYCOSTATIN) 083317 UNIT/GM powder Apply 3 times daily.  6/25/20  Yes Tamia Canas MD   aspirin 81 MG chewable tablet Take 1 tablet by mouth daily 12/3/19  Yes Tamia Canas MD   lidocaine (LMX) 4 % cream Apply topically every 8 hrs as needed for pain 11/11/19  Yes Tamia Canas MD   pantoprazole (PROTONIX) 40 MG tablet Take 1 tablet by mouth 2 times daily 3/15/19  Yes Garrett Dover,    nitroGLYCERIN (NITROSTAT) 0.4 MG SL tablet 1 under the tongue as needed for angina, may repeat q5mins for up three doses 8/28/18  Yes Historical Provider, MD   ipratropium-albuterol (DUONEB) 0.5-2.5 (3) MG/3ML SOLN nebulizer solution Inhale 3 mLs into the lungs every 4 hours 2/6/18  Yes Lesa Pimentel MD   Melatonin 10 MG TABS Take 10 mg by mouth nightly 10/19/17  Yes Tamia Canas MD   docusate sodium (COLACE) 100 MG capsule Take 1 capsule by mouth 2 times daily Please use while taking Percocet 9/10/15  Yes Jose Epperson MD   SYMBICORT 160-4.5 MCG/ACT AERO   2 puffs As needed for sob 5/28/15  Yes Historical Provider, MD   Blood Glucose Monitoring Suppl (ONE TOUCH ULTRA 2) w/Device KIT  2/10/21   Historical Provider, MD   ONETOUCH ULTRA strip TEST TWO (2) TO THREE (3) TIMES A DAY& AS NEEDED  3/5/21   Varsha Argueta MD   blood glucose monitor strips Test 2-3 times a day & as needed for symptoms of irregular blood glucose. Please fill for freestyle test strips 2/4/21   Varsha Argueta MD   BiPAP Machine MISC repair/replace Bipap maintain 18/9CMH2O, Cflex=3,mask,tubing,filters,headgear,heated humidifier,all supplies PRN 1/28/21   Historical Provider, MD   blood glucose test strips (TRUE METRIX BLOOD GLUCOSE TEST) strip THREE TIMES A DAY 1/21/21   Varsha Argueta MD   citalopram (CELEXA) 40 MG tablet TAKE 1 2 TABLET BY MOUTH TWICE A DAY  Patient taking differently: Take 20 mg by mouth 2 times daily  1/15/21   Varsha Argueta MD   zoster recombinant adjuvanted vaccine [facility]) 50 MCG/0.5ML SUSR injection 50 MCG IM then repeat 2-6 months. 9/14/20 9/14/21  Varsha Argueta MD   ULTICARE MINI PEN NEEDLES 31G X 6 MM MISC USE AS DIRECTED  8/14/20   Varsha Argueta MD   Lift Chair MISC by Does not apply route 9/24/19   Varsha Argueta MD   Misc.  Devices (ADJUST BATH/SHOWER SEAT/BACK) MISC Use daily for shower 9/24/19   Varsha Argueta MD   Alcohol Swabs (B-D SINGLE USE SWABS REGULAR) PADS THREE TIMES A DAY 12/12/18   Varsha Argueta MD   Blood Glucose Monitoring Suppl HARMEET Use daily to check BS 3/27/18   Varsha Argueta MD   Compression Stockings MISC by Does not apply route Pressure between 20 - 25 . 9/11/17   Varsha Argueta MD   OXYGEN Inhale 3 L into the lungs     Historical Provider, MD      Current Facility-Administered Medications: glucose (GLUTOSE) 40 % oral gel 15 g, 15 g, Oral, PRN  dextrose 50 % IV solution, 12.5 g, Intravenous, PRN  glucagon (rDNA) injection 1 mg, 1 mg, Intramuscular, PRN  dextrose 5 % solution, 100 mL/hr, Intravenous, PRN  albuterol (PROVENTIL) nebulizer solution 2.5 mg, 2.5 mg, Nebulization, Q6H PRN  aspirin chewable tablet 81 mg, 81 mg, Oral, Daily  atorvastatin (LIPITOR) tablet 40 mg, 40 mg, Oral, Daily  carvedilol (COREG) tablet 3.125 mg, 3.125 mg, Oral, Daily  citalopram (CELEXA) tablet 20 mg, 20 mg, Oral, BID  dicyclomine (BENTYL) capsule 10 mg, 10 mg, Oral, BID PRN  docusate sodium (COLACE) capsule 100 mg, 100 mg, Oral, BID  ferrous sulfate (FE TABS 325) EC tablet 325 mg, 325 mg, Oral, Daily with breakfast  furosemide (LASIX) tablet 20 mg, 20 mg, Oral, Daily  insulin glargine (LANTUS) injection vial 50 Units, 50 Units, Subcutaneous, BID  losartan (COZAAR) tablet 50 mg, 50 mg, Oral, Daily  magnesium oxide (MAG-OX) tablet 400 mg, 400 mg, Oral, BID  nitroGLYCERIN (NITROSTAT) SL tablet 0.4 mg, 0.4 mg, Sublingual, Q5 Min PRN  pantoprazole (PROTONIX) tablet 40 mg, 40 mg, Oral, BID  topiramate (TOPAMAX) tablet 100 mg, 100 mg, Oral, Nightly  vitamin B-12 (CYANOCOBALAMIN) tablet 50 mcg, 50 mcg, Oral, Daily  sodium chloride flush 0.9 % injection 10 mL, 10 mL, Intravenous, 2 times per day  sodium chloride flush 0.9 % injection 10 mL, 10 mL, Intravenous, PRN  0.9 % sodium chloride infusion, 25 mL, Intravenous, PRN  enoxaparin (LOVENOX) injection 40 mg, 40 mg, Subcutaneous, Daily  acetaminophen (TYLENOL) tablet 650 mg, 650 mg, Oral, Q4H PRN  ondansetron (ZOFRAN) injection 4 mg, 4 mg, Intravenous, Q8H PRN  melatonin tablet 10 mg, 10 mg, Oral, Nightly  oxyCODONE-acetaminophen (PERCOCET) 5-325 MG per tablet 1 tablet, 1 tablet, Oral, TID PRN    Allergies:  Robitussin [guaifenesin], Codeine, Compazine [prochlorperazine maleate], Iodides, Morphine, Moxifloxacin, Reglan [metoclopramide], Avelox [moxifloxacin hcl in nacl], Cipro xr, and Sulfa antibiotics    Social History:   reports that she quit smoking about 21 years ago. Her smoking use included cigarettes. She has a 123.00 pack-year smoking history. She has never used smokeless tobacco. She reports that she does not drink alcohol or use drugs. Family History: family history includes Diabetes in her maternal grandmother and mother; Emphysema in her sister; Heart Disease in her mother; Stomach Cancer in her father. No h/o sudden cardiac death. No for premature CAD    REVIEW OF SYSTEMS:    · Constitutional: there has been no unanticipated weight loss. There's been No change in energy level, No change in activity level. · Eyes: No visual changes or diplopia. No scleral icterus. · ENT: No Headaches  · Cardiovascular: No cardiac history  · Respiratory: No previous pulmonary problems, No cough  · Gastrointestinal: No abdominal pain. No change in bowel or bladder habits. · Genitourinary: No dysuria, trouble voiding, or hematuria. · Musculoskeletal:  No gait disturbance, No weakness or joint complaints. · Integumentary: No rash or pruritis. · Neurological: No headache, diplopia, change in muscle strength, numbness or tingling. No change in gait, balance, coordination, mood, affect, memory, mentation, behavior. · Psychiatric: No anxiety, or depression. · Endocrine: No temperature intolerance. No excessive thirst, fluid intake, or urination. No tremor. · Hematologic/Lymphatic: No abnormal bruising or bleeding, blood clots or swollen lymph nodes. · Allergic/Immunologic: No nasal congestion or hives. PHYSICAL EXAM:      BP (!) 147/70   Pulse 74   Temp 97.9 °F (36.6 °C) (Oral)   Resp 18   Ht 5' 2\" (1.575 m)   Wt 254 lb (115.2 kg)   LMP  (LMP Unknown)   SpO2 92%   BMI 46.46 kg/m²    Constitutional and General Appearance: alert, cooperative, no distress and appears stated age  HEENT: PERRL, no cervical lymphadenopathy. No masses palpable. Normal oral mucosa  Respiratory:  · Normal excursion and expansion without use of accessory muscles  · Resp Auscultation: Good respiratory effort. No for increased work of breathing.  On auscultation: clear to auscultation bilaterally  Cardiovascular:  · The apical impulse is not displaced  · Heart tones are crisp and normal. regular S1 and S2.  · Jugular venous pulsation Normal  · The carotid upstroke is normal in amplitude and contour without delay or bruit  · Peripheral pulses are symmetrical and full Abdomen:   · No masses or tenderness  · Bowel sounds present  Extremities:  ·  No Cyanosis or Clubbing  ·  Lower extremity edema: No  ·  Skin: Warm and dry  Neurological:  · Alert and oriented. · Moves all extremities well  · No abnormalities of mood, affect, memory, mentation, or behavior are noted    DATA:    Diagnostics:    EKG: normal EKG, normal sinus rhythm, unchanged from previous tracings. ECHO: obtained and reviewed. Stress Test: obtained and reviewed. Cardiac Angiography: not obtained. Labs:     CBC:   Recent Labs     04/05/21  1430   WBC 8.4   HGB 12.3   HCT 38.5        BMP:   Recent Labs     04/05/21  1430   *   K 4.2   CO2 26   BUN 19   CREATININE 1.04*   LABGLOM 52*   GLUCOSE 279*     BNP: No results for input(s): BNP in the last 72 hours. PT/INR: No results for input(s): PROTIME, INR in the last 72 hours. APTT:No results for input(s): APTT in the last 72 hours. CARDIAC ENZYMES:No results for input(s): CKTOTAL, CKMB, CKMBINDEX, TROPONINI in the last 72 hours.   FASTING LIPID PANEL:  Lab Results   Component Value Date    HDL 37 02/25/2021    TRIG 289 02/25/2021     LIVER PROFILE:  Recent Labs     04/05/21  1430   AST 31   ALT 29   LABALBU 3.7       IMPRESSION:    Patient Active Problem List   Diagnosis    Chronic pain of left knee    Type 2 diabetes mellitus with diabetic polyneuropathy, with long-term current use of insulin (HCC)    Nonintractable migraine, unspecified migraine type    Coronary artery disease due to lipid rich plaque    DDD (degenerative disc disease), lumbar    Chronic, continuous use of opioids    DDD (degenerative disc disease), cervical    Osteoarthritis of cervical spine    Medication monitoring encounter    GERD (gastroesophageal reflux disease)    HTN (hypertension), benign    Mixed hyperlipidemia    Insomnia    Lumbosacral spondylosis without myelopathy    Sacroiliitis, not elsewhere classified ([facility] [address]    Carpal tunnel syndrome    Mixed stress and urge urinary incontinence    Bilateral low back pain with sciatica    Intractable migraine with aura without status migrainosus    Spondylarthrosis    Anxiety and depression    Chronic migraine without aura without status migrainosus, not intractable    Morbid (severe) obesity with alveolar hypoventilation (HCC)    Lung nodule    Neuropathy    Obstructive sleep apnea syndrome    Asthma with COPD ([facility] [address]    Gastroesophageal reflux disease with esophagitis    Morbid obesity with BMI of 40.0-44.9, adult (HCC)    Congestive heart failure (HCC)    Stage 3 chronic kidney disease    Benign hypertension with CKD (chronic kidney disease) stage III    Secondary diabetes mellitus with stage 3 chronic kidney disease (GFR 30-59) (HCC)    CKD (chronic kidney disease) stage 3, GFR 30-59 ml/min    Episode of altered cognition    Lumbar radiculopathy, chronic    Sessile colonic polyp    Gastroesophageal reflux disease    Morbid obesity (HCC)    Adenomatous polyp of ascending colon    Irritable bowel syndrome with both constipation and diarrhea    Abdominal bloating    Long term (current) use of insulin (HCC)    Major depressive disorder, single episode, unspecified    Permanent atrial fibrillation (HCC)    Polyneuropathy, unspecified    Other chronic pain    Opioid use, unspecified, uncomplicated    Chest pain       RECOMMENDATIONS:    Atypical Chest Pain  - Proceed with NM cardiac stress test today  - Continue home meds  - EKG NSR with no significant changes from 04.05.21  - Trops WNL, CXR not indicative of acute cardiopulmonary pathology  - CT Chest PE demonstrated concern for pulmonary a.  HTN & 8mm R middle lobe nodule  - Last echo (02.05.20) non-indicative, EF 66%  - Last cardiac stress test (02.02.20) = low risk stratification, calculated EF 68%  - Pain positional in nature and reproduced by deep inspiration and palpation of midsternal chest, suggesting etiology is more musculoskeletal in nature    Discussed with patient and Nurse. Electronically signed by Camilo Bland on 4/6/2021 at 8:01 AM    [facility]      [phone]      I interviewed the patient personally, and examined by me during the visit. I have reviewed the H&P/Consult / Progress note as completed, and made appropriate changes to the patient exam and treatment plans.       I have reviewed the case with resident / fellow    Patient treatment plan was explained to patient, correction in notes was made as appropriate, and discussed final arrangement based on  my evaluation and exam.    Additional Recommendations:      MD Clarence Cornejo [facility]

## 2021-04-06 NOTE — DISCHARGE SUMMARY
CDU Discharge Summary        Patient:  Lokesh Garcia  YOB: 1950    MRN: [id number]   Acct: [de-identified]    Primary Care Physician: Jorge Chiu MD    Admit date:  2021  2:26 PM  Discharge date: 2021    Discharge Diagnoses:     Acute chest pain due to unspecified etiology  Improved with rest and analgesics    Follow-up:  Call today/tomorrow for a follow up appointment with Jorge Chiu MD , or return to the Emergency Room with worsening symptoms    Stressed to patient the importance of following up with primary care doctor for further workup/management of symptoms. Pt verbalizes understanding and agrees with plan. Discharge Medications:  Changes to medications: None        Ladarius Santana   Home Medication Instructions WZ    Printed on:21 0767   Medication Information                      albuterol (PROVENTIL) (2.5 MG/3ML) 0.083% nebulizer solution  Take 3 mLs by nebulization every 6 hours as needed for Wheezing             Alcohol Swabs (B-D SINGLE USE SWABS REGULAR) PADS  THREE TIMES A DAY             aspirin 81 MG chewable tablet  Take 1 tablet by mouth daily             atorvastatin (LIPITOR) 40 MG tablet  Take 1 tablet by mouth daily             BiPAP Machine MISC  repair/replace Bipap maintain 18/9CMH2O, Cflex=3,mask,tubing,filters,headgear,heated humidifier,all supplies PRN             blood glucose monitor strips  Test 2-3 times a day & as needed for symptoms of irregular blood glucose.  Please fill for freestyle test strips             Blood Glucose Monitoring Suppl (ONE TOUCH ULTRA 2) w/Device KIT               Blood Glucose Monitoring Suppl HARMEET  Use daily to check BS             blood glucose test strips (TRUE METRIX BLOOD GLUCOSE TEST) strip  THREE TIMES A DAY             carvedilol (COREG) 3.125 MG tablet  TAKE 1 TAB BY MOUTH TWICE A DAY ( IN THE MORNING AND BEDTIME )              citalopram (CELEXA) 40 MG tablet  TAKE 1 2 TABLET BY MOUTH TWICE A DAY             clopidogrel (PLAVIX) 75 MG tablet  TAKE 1 TAB BY MOUTH ONCE A DAY ( IN THE MORNING ) -THIS MEDICINE MAY BE TAKENWITH OR WITHOUT FOOD              Compression Stockings MISC  by Does not apply route Pressure between 20 - 25 .             dicyclomine (BENTYL) 10 MG capsule  Take 1 capsule by mouth 2 times daily as needed (abdominal spasm)             docusate sodium (COLACE) 100 MG capsule  Take 1 capsule by mouth 2 times daily Please use while taking Percocet             ferrous sulfate (IRON 325) 325 (65 Fe) MG tablet  TAKE 1 TAB BY MOUTH EVERY MORNING              furosemide (LASIX) 20 MG tablet  TAKE 1 TAB BY MOUTH ONCE A DAY AS NEEDED ( WEIGHT GAIN 3 LBS OVERNIGHT )              gabapentin (NEURONTIN) 300 MG capsule  Take 1 capsule by mouth 3 times daily for 30 days. Icosapent Ethyl (VASCEPA) 1 g CAPS capsule  Take 1 capsule by mouth 2 times daily             insulin aspart (NOVOLOG FLEXPEN) 100 UNIT/ML injection pen  IF <139 - NO INSULIN; 140-199-2 UNITS;200-249-4 UNITS;250-299-6 UNITS;300-349-8 UNITS;350-400=10 UNITS;ABOVE 400-12 UNITS             ipratropium-albuterol (DUONEB) 0.5-2.5 (3) MG/3ML SOLN nebulizer solution  Inhale 3 mLs into the lungs every 4 hours             isosorbide dinitrate (ISORDIL) 30 MG tablet  Take 30 mg by mouth             LANTUS SOLOSTAR 100 UNIT/ML injection pen  INJECT 42 UNITS INTO THE SKIN 2 TIMES DAILY              lidocaine (LMX) 4 % cream  Apply topically every 8 hrs as needed for pain             Lift Chair MISC  by Does not apply route             losartan (COZAAR) 25 MG tablet  TAKE 1 TAB BY MOUTH ONCE A DAY ( IN THE MORNING )             magnesium oxide (MAG-OX) 400 MG tablet  TAKE 1 TAB BY MOUTH TWICE A DAY ( IN THE MORNING AND BEDTIME )             Melatonin 10 MG TABS  Take 10 mg by mouth nightly             metFORMIN (GLUCOPHAGE) 1000 MG tablet  TAKE 1 TAB BY MOUTH TWICE A DAY ( IN THE MORNING AND BEDTIME )              Misc.  Devices (ADJUST BATH/SHOWER SEAT/BACK) MISC  Use daily for shower             Multiple Vitamins-Minerals (THERAPEUTIC MULTIVITAMIN-MINERALS) tablet  Take 1 tablet by mouth daily Takes Women over 50 Complete Vitamin daily ([employer] brand)             nitroGLYCERIN (NITROSTAT) 0.4 MG SL tablet  1 under the tongue as needed for angina, may repeat q5mins for up three doses             nystatin (MYCOSTATIN) 483738 UNIT/GM powder  Apply 3 times daily. ofloxacin (OCUFLOX) 0.3 % solution               ONETOUCH ULTRA strip  TEST TWO (2) TO THREE (3) TIMES A DAY& AS NEEDED              oxyCODONE-acetaminophen (PERCOCET) 5-325 MG per tablet  Take 1 tablet by mouth See Admin Instructions for 30 days. Intended supply: 30 days. 1 tab 3-4 times a day prn             OXYGEN  Inhale 3 L into the lungs              pantoprazole (PROTONIX) 40 MG tablet  Take 1 tablet by mouth 2 times daily             Psyllium (METAMUCIL PO)  Take by mouth 3 times daily Takes powder             SYMBICORT 160-4.5 MCG/ACT AERO    2 puffs As needed for sob             topiramate (TOPAMAX) 100 MG tablet  Take 1 tablet by mouth nightly             ULTICARE MINI PEN NEEDLES 31G X 6 MM MISC  USE AS DIRECTED              vitamin B-12 (CYANOCOBALAMIN) 100 MCG tablet  Take 50 mcg by mouth daily             zoster recombinant adjuvanted vaccine (SHINGRIX) 50 MCG/0.5ML SUSR injection  50 MCG IM then repeat 2-6 months.                  Diet:  Diet NPO, After Midnight  DIET GENERAL; , Advance as tolerated     Activity:  As tolerated    Consultants: IP CONSULT TO CARDIOLOGY    Procedures:  Not indicated     Diagnostic Test:   Results for orders placed or performed during the hospital encounter of 04/05/21   CBC WITH AUTO DIFFERENTIAL   Result Value Ref Range    WBC 8.4 3.5 - 11.3 k/uL    RBC 3.95 3.95 - 5.11 m/uL    Hemoglobin 12.3 11.9 - 15.1 g/dL    Hematocrit 38.5 36.3 - 47.1 %    MCV 97.5 82.6 - 102.9 fL    MCH 31.1 25.2 - 33.5 pg    MCHC 31.9 28.4 - 34.8 g/dL RDW 12.8 11.8 - 14.4 %    Platelets 944 866 - 397 k/uL    MPV 10.9 8.1 - 13.5 fL    NRBC Automated 0.0 0.0 per 100 WBC    Differential Type NOT REPORTED     Seg Neutrophils 60 36 - 65 %    Lymphocytes 26 24 - 43 %    Monocytes 10 3 - 12 %    Eosinophils % 2 1 - 4 %    Basophils 1 0 - 2 %    Immature Granulocytes 1 (H) 0 %    Segs Absolute 5.11 1.50 - 8.10 k/uL    Absolute Lymph # 2.14 1.10 - 3.70 k/uL    Absolute Mono # 0.82 0.10 - 1.20 k/uL    Absolute Eos # 0.19 0.00 - 0.44 k/uL    Basophils Absolute 0.05 0.00 - 0.20 k/uL    Absolute Immature Granulocyte 0.04 0.00 - 0.30 k/uL    WBC Morphology NOT REPORTED     RBC Morphology NOT REPORTED     Platelet Estimate NOT REPORTED    COMPREHENSIVE METABOLIC PANEL   Result Value Ref Range    Glucose 279 (H) 70 - 99 mg/dL    BUN 19 8 - 23 mg/dL    CREATININE 1.04 (H) 0.50 - 0.90 mg/dL    Bun/Cre Ratio NOT REPORTED 9 - 20    Calcium 8.7 8.6 - 10.4 mg/dL    Sodium 133 (L) 135 - 144 mmol/L    Potassium 4.2 3.7 - 5.3 mmol/L    Chloride 98 98 - 107 mmol/L    CO2 26 20 - 31 mmol/L    Anion Gap 9 9 - 17 mmol/L    Alkaline Phosphatase 59 35 - 104 U/L    ALT 29 5 - 33 U/L    AST 31 <32 U/L    Total Bilirubin 0.21 (L) 0.3 - 1.2 mg/dL    Total Protein 6.7 6.4 - 8.3 g/dL    Albumin 3.7 3.5 - 5.2 g/dL    Albumin/Globulin Ratio 1.2 1.0 - 2.5    GFR Non-African American 52 (L) >60 mL/min    GFR African American >60 >60 mL/min    GFR Comment          GFR Staging NOT REPORTED    Troponin   Result Value Ref Range    Troponin, High Sensitivity 6 0 - 14 ng/L    Troponin T NOT REPORTED <0.03 ng/mL    Troponin Interp NOT REPORTED    D-DIMER, QUANTITATIVE   Result Value Ref Range    D-Dimer, Quant 0.53 mg/L FEU   Brain Natriuretic Peptide   Result Value Ref Range    Pro- <300 pg/mL    BNP Interpretation Pro-BNP Reference Range:    Troponin   Result Value Ref Range    Troponin, High Sensitivity 6 0 - 14 ng/L    Troponin T NOT REPORTED <0.03 ng/mL    Troponin Interp NOT REPORTED Troponin   Result Value Ref Range    Troponin, High Sensitivity <6 0 - 14 ng/L    Troponin T NOT REPORTED <0.03 ng/mL    Troponin Interp NOT REPORTED    POC Glucose Fingerstick   Result Value Ref Range    POC Glucose 272 (H) 65 - 105 mg/dL   POC Glucose Fingerstick   Result Value Ref Range    POC Glucose 307 (H) 65 - 105 mg/dL   POC Glucose Fingerstick   Result Value Ref Range    POC Glucose 454 (HH) 65 - 105 mg/dL   POC Glucose Fingerstick   Result Value Ref Range    POC Glucose 299 (H) 65 - 105 mg/dL   EKG 12 Lead   Result Value Ref Range    Ventricular Rate 77 BPM    Atrial Rate 77 BPM    P-R Interval 130 ms    QRS Duration 66 ms    Q-T Interval 410 ms    QTc Calculation (Bazett) 463 ms    P Axis 59 degrees    R Axis -11 degrees    T Axis 74 degrees   EKG 12 Lead   Result Value Ref Range    Ventricular Rate 75 BPM    Atrial Rate 75 BPM    P-R Interval 150 ms    QRS Duration 70 ms    Q-T Interval 414 ms    QTc Calculation (Bazett) 462 ms    P Axis 60 degrees    R Axis -29 degrees    T Axis 74 degrees     Ct Chest Wo Contrast    Result Date: 3/9/2021  EXAMINATION: CT OF THE CHEST WITHOUT CONTRAST 3/9/2021 10:04 am TECHNIQUE: CT of the chest was performed without the administration of intravenous contrast. Multiplanar reformatted images are provided for review. Dose modulation, iterative reconstruction, and/or weight based adjustment of the mA/kV was utilized to reduce the radiation dose to as low as reasonably achievable. COMPARISON: PET-CT 07/09/2020, chest CT 04/22/2020, 06/14/2019, 06/08/2016 HISTORY: ORDERING SYSTEM PROVIDED HISTORY: Lung nodule seen on imaging study FINDINGS: Mediastinum: The heart and great vessels are normal in size. Calcified atheromatous plaque and coronary calcifications are noted. No pericardial effusion. No enlarged or suspicious-appearing lymph nodes are identified. Lungs/pleura: 0.8 cm noncalcified nodule in the right middle lobe on axial image 78 is again demonstrated. Subsegmental scarring in the medial right upper lobe again demonstrated. No new airspace disease. No effusion. The central airway is patent. Upper Abdomen: No acute abnormality identified. Status post cholecystectomy. Soft Tissues/Bones: Subcutaneous cardiac monitoring device in the medial superior left chest.  No acute abnormality identified. Stable 0.8 cm nodule in the right middle lobe, with little interval change since at least 2016 favoring a benign process. Continued attention to on follow-up should be considered based on the patient's risk factors. Xr Chest Portable    Result Date: 4/5/2021  EXAMINATION: ONE XRAY VIEW OF THE CHEST 4/5/2021 3:12 pm COMPARISON: 10/30/2020 HISTORY: ORDERING SYSTEM PROVIDED HISTORY: midsternal chest pain radiating to back TECHNOLOGIST PROVIDED HISTORY: midsternal chest pain radiating to back Acuity: Unknown Type of Exam: Unknown FINDINGS: Single portable frontal view of the chest is submitted for review. The cardiac silhouette is borderline prominent and there is mild central vascular congestion. No definite focal airspace consolidation, sizeable pleural effusion or pneumothorax. Loop recorder projects overlying the left hemithorax. Trachea is midline. Visualized osseous structures and soft tissues are grossly intact. Borderline cardiomegaly. No acute cardiopulmonary pathology. Ct Chest Pulmonary Embolism W Contrast    Result Date: 4/5/2021  EXAMINATION: CTA OF THE CHEST 4/5/2021 6:50 pm TECHNIQUE: CTA of the chest was performed after the administration of intravenous contrast.  Multiplanar reformatted images are provided for review. MIP images are provided for review. Dose modulation, iterative reconstruction, and/or weight based adjustment of the mA/kV was utilized to reduce the radiation dose to as low as reasonably achievable.  COMPARISON: 03/09/2021 HISTORY: ORDERING SYSTEM PROVIDED HISTORY: concern for PE vs dissection TECHNOLOGIST PROVIDED HISTORY: concern for PE vs dissection Decision Support Exception->Emergency Medical Condition (MA) Reason for Exam: concern for PE vs dissection Acuity: Acute Type of Exam: Initial FINDINGS: Pulmonary Arteries: Pulmonary arteries are adequately opacified for evaluation. No evidence of intraluminal filling defect to suggest pulmonary embolism. Main pulmonary artery is dilated to 3.5 cm. Mediastinum: No evidence of mediastinal lymphadenopathy. The heart and pericardium demonstrate no acute abnormality. There is no acute abnormality of the thoracic aorta. Coronary artery atherosclerosis. Lungs/pleura: There is no pleural effusion or pneumothorax. There is medial atelectasis in the right upper lobe, similar to prior examination. No new pulmonary consolidation. 8 mm noncalcified right middle lobe nodule is unchanged. Upper Abdomen: No acute abnormality within the visualized upper abdomen. Small hiatal hernia. Soft Tissues/Bones: No acute bone or soft tissue abnormality. 1. No pulmonary embolus. 2. Dilation of the main pulmonary artery, concerning for pulmonary artery hypertension. 3. 8 mm right middle lobe nodule is unchanged in the interval.     Nm Cardiac Stress Test Nuclear Imaging    Result Date: 4/6/2021  EXAMINATION: MYOCARDIAL PERFUSION IMAGING 4/6/2021 10:33 am TECHNIQUE: Rest dose:  15 mCi Tc-99m sestamibi intravenously Stress dose:  40 mCi Tc-99m sestamibi intravenously Under cardiology supervision, 0.4 mg Lexiscan was infused intravenously prior to injection of the stress dose. SPECT imaging was acquired following injection of the sestamibi. ECG gating was obtained following the stress acquisition.  COMPARISON: 02/04/2020 HISTORY: ORDERING SYSTEM PROVIDED HISTORY: Chest pain TECHNOLOGIST PROVIDED HISTORY: Reason for Exam: Chest pain Procedure Type->Rx chest pain 67-year-old female with chest pain FINDINGS: The patient achieved a maximum heart rate of 86 beats per minute, 57 % of the maximum age predicted heart rate of 149 beats per minute. Perfusion: There is no scintigraphic evidence for a reversible or fixed perfusion defect to suggest reversible ischemia or infarct. Function: The gated SPECT data demonstrates normal left ventricular size and normal wall motion. Left ventricular ejection fraction:  Greater than 70% TID score:  1.08 (threshold value of 1.39 is used for Lexiscan stress with Tc-99m). There is no stress-induced cavitary dilatation to suggest compensated triple vessel disease. End diastolic volume:  99IA Scores are visually adjusted to account for potential artifact. Summed stress score:  0 Summed rest score:  0 Summed reversibility score:  0     1. No definitive scintigraphic evidence for reversible ischemia or infarct. 2. Left ventricular ejection fraction of greater than 70%. 3.  Please see report for EKG portion of the examination which will be performed separately by physician from cardiology. Risk stratification:  Low risk Note:  Risk stratification incorporates both clinical history and test results. Final risk determination is the responsibility of the ordering provider as history and other test results may increase or decrease the risk stratification reported for this examination. Risk stratification criteria are adapted from \"Noninvasive Risk Stratification\" criteria from [employer]. Al, ACC/AATS/AHA/ASE/ASNC/SCAI/SCCT/STS 2017 Appropriate Use Criteria For Coronary Revascularization in Patients With Stable Ischemic Heart Disease [facility] Volume 69, Issue 17, May 2017 High risk (>3% annual death or MI) 1. Severe resting LV dysfunction (LVEF >35%) not readily explained by non coronary causes 2. Resting perfusion abnormalities greater than 10% of the myocardium in patients without prior history or evidence of MI 3.   Stress-induced perfusion abnormalities encumbering greater than or equal to 10% myocardium or stress segmental scores indicating multiple vascular territories with abnormalities 4. Stress-induced LV dilatation (TID ratio greater than 1.19 for exercise and greater than 1.39 for regadenoson) Intermediate risk (1% to 3% annual death or MI) 1. Mild/moderate resting LV dysfunction (LVEF 35% to 49%) not readily explained by non coronary causes. 2.  Resting perfusion abnormalities in 5%-9.9% of the myocardium in patients without a history or prior evidence of MI 3. Stress-induced perfusion abnormality encumbering 5%-9.9% of the myocardium or stress segmental scores indicating 1 vascular territory with abnormalities but without LV dilation 4. Small wall motion abnormality involving 1-2 segments and only 1 coronary bed. Low Risk (Less than 1% annual death or MI) 1. Normal or small myocardial perfusion defect at rest or with stress encumbering less than 5% of the myocardium. This examination was read in conjunction with the cardiology fellow, Dr. Ceci Shetty and Dr. Kev Schumacher           Physical Exam:    General appearance - NAD, AOx 3   Lungs -CTAB, no R/R/R  Heart - RRR, no M/R/G  Abdomen - Soft, NT/ND  Neurological:  MAEx4, No focal motor deficit, sensory loss  Extremities - Cap refil <2 sec in all ext., no edema  Skin -warm, dry      Hospital Course:  Clinical course has improved, labs and imaging reviewed. Cade Omalley originally presented to the hospital on 4/5/2021  2:26 PM. with chest pain. CTA was negative for PE or dissection. No elevation in BNP. At that time it was determined that She required further observation and evaluation by cardiology. She was admitted and labs and imaging were followed daily. Imaging results as above. Patient had stress test that was low risk. Follow-up with cardiology outpatient. She is medically stable to be discharged. I discussed signs and symptoms that would require reevaluation in the ED. The patient expressed understanding and agreement with plan. All questions answered.       Disposition: Home    Patient stated

## 2021-04-06 NOTE — PROCEDURES
[address] [facility] [address]                              CARDIAC STRESS TEST    PATIENT NAME: Liz Mendez                :        1950  MED REC NO:   [id number]                             ROOM:       7890  ACCOUNT NO:   [de-identified]                           ADMIT DATE: 2021  PROVIDER:     Stephan Franco MD    DATE OF STUDY:  2021  ORDERING PROVIDER: Amparo Dodson DO  PRIMARY CARE PROVIDER: Courtney Tavarez MD    LEXISCAN STRESS STUDY:    Indication:  Chest pain    Procedure explained and consent signed      Resting heart rate: 82 bpm  Resting blood pressure: 112/59 mmHg  Infusion heart rate: 86 bpm  Infusion blood pressure: 112/59 mmHg  Resting EKG: Normal  Stress heart response: Normal response  Stress BP response: Appropriate  Stress EKG(S): No changes seen  Chest discomfort: Pretest 7/10; 8/10 chest pain during test  Ischemic EKG changes: None      Electrocardiographically negative Lexiscan stress study. Radio-isotope  results to follow from the department of Nuclear Medicine.       Elissa Escamilla MD    D: 2021 13:35:04       T: 2021 13:41:43     /[id number]  Job#: [id number]     Doc#: Unknown

## 2021-04-06 NOTE — H&P
[address]  CDU / OBSERVATION ENCOUNTER  RESIDENT NOTE     Pt Name: Renetta Hernández  MRN: [id number]  [place] 1950  Date of evaluation: 4/6/21  Patient's PCP is :  Kalli Perdue MD    CHIEF COMPLAINT       Chief Complaint   Patient presents with    Shortness of Breath     HISTORY OF PRESENT ILLNESS   Renetta Hernández is a 69 y.o. female with history of CAD, atrial fibrillation on Plavix, hypertension, CKD, CHF, COPD, diabetes, stroke who presents with chest pain shortness of breath and chest pain. Yesterday patient was sitting on the couch when she became short of breath, put on home O2 which she wears intermittently. Shortly after she developed substernal chest pressure and generalized weakness. CT PE negative for PE or dissection but did show dilation of pulmonary artery concerning for pulmonary hypertension. Low risk stress test 2/2/2020. Echo at that time showed mild diastolic dysfunction with EF 66%. Location/Symptom: Chest pain  Timing/Onset: Last night  Provocation: None  Quality: Pressure  Radiation: None  Severity: 10/10 on arrival  Timing/Duration: Intermittent  Modifying Factors: None    REVIEW OF SYSTEMS       General ROS - No fevers, No malaise   Ophthalmic ROS - No discharge, No changes in vision  ENT ROS -  No sore throat, No rhinorrhea,   Respiratory ROS -positive for shortness of breath, no cough, no  wheezing  Cardiovascular ROS -positive for chest pain, no dyspnea on exertion  Gastrointestinal ROS - No abdominal pain, no nausea or vomiting, no change in bowel habits, no black or bloody stools  Genito-Urinary ROS - No dysuria, trouble voiding, or hematuria  Musculoskeletal ROS - No myalgias, No arthalgias  Neurological ROS - No headache, no dizziness/lightheadedness, No focal weakness, no loss of sensation  Dermatological ROS - No lesions, No rash     (PQRS) Advance directives on face sheet per hospital policy.  No change unless specifically mentioned in chart    PAST MEDICAL HISTORY    has a past medical history of Abdominal bloating, Adenomatous polyp of ascending colon, Allergic rhinitis, Anxiety, Arthritis, Asthma, Atrial fibrillation (HCC), Back pain, Benign hypertension with CKD (chronic kidney disease) stage III, CAD (coronary artery disease), Caffeine use, CHF (congestive heart failure) ([address] Chronic kidney disease, CKD (chronic kidney disease) stage 3, GFR 30-59 ml/min, COPD (chronic obstructive pulmonary disease) ([address] Degeneration of lumbar or lumbosacral intervertebral disc, Depression, DM (diabetes mellitus) ([address] Emphysema of lung ([address] Gastritis, GERD (gastroesophageal reflux disease), Hearing loss, Hematuria, Hiatal hernia, HTN (hypertension), benign, Hypertriglyceridemia, Incontinence of urine, Irritable bowel syndrome with both constipation and diarrhea, Knee arthropathy, Lumbosacral spondylosis without myelopathy, Migraine headache, Mumps, Neuropathy, Obesity, On home oxygen therapy, HIGINIO on CPAP, Otitis media, Secondary diabetes mellitus with stage 3 chronic kidney disease (GFR 30-59) ([address] Stroke ([address] Tinnitus, Type 2 diabetes mellitus without complication ([address] Type II or unspecified type diabetes mellitus without mention of complication, not stated as uncontrolled, UTI (lower urinary tract infection), and Varicose vein. I have reviewed the past medical history with the patient and it is pertinent to this complaint. SURGICAL HISTORY      has a past surgical history that includes Cholecystectomy (2007); Carpal tunnel release (Right); Tympanoplasty; Dilation & curettage (1979); Cystocopy (9/25/2013); Cataract removal with implant (Bilateral); Tonsillectomy; Incontinence surgery; ablation of dysrhythmic focus (2000); Upper gastrointestinal endoscopy (03/2016); eye surgery; Tubal ligation; other surgical history (09/10/15); Insertable Cardiac Monitor (12/04/2015); Tympanoplasty; Colonoscopy;  Colonoscopy (04/10/2017); pr colsc flx w/removal lesion by hot bx forceps (N/A, 4/10/2017); Tympanostomy tube placement (08/21/2017); Upper gastrointestinal endoscopy (N/A, 3/2/2021); and Colonoscopy (N/A, 3/2/2021). I have reviewed and agree with Surgical History entered and it is pertinent to this complaint. CURRENT MEDICATIONS     glucose (GLUTOSE) 40 % oral gel 15 g, PRN  dextrose 50 % IV solution, PRN  glucagon (rDNA) injection 1 mg, PRN  dextrose 5 % solution, PRN  insulin lispro (HUMALOG) injection vial 0-18 Units, TID WC  insulin lispro (HUMALOG) injection vial 0-9 Units, Nightly  albuterol (PROVENTIL) nebulizer solution 2.5 mg, Q6H PRN  aspirin chewable tablet 81 mg, Daily  atorvastatin (LIPITOR) tablet 40 mg, Daily  carvedilol (COREG) tablet 3.125 mg, Daily  citalopram (CELEXA) tablet 20 mg, BID  dicyclomine (BENTYL) capsule 10 mg, BID PRN  docusate sodium (COLACE) capsule 100 mg, BID  ferrous sulfate (FE TABS 325) EC tablet 325 mg, Daily with breakfast  furosemide (LASIX) tablet 20 mg, Daily  insulin glargine (LANTUS) injection vial 50 Units, BID  losartan (COZAAR) tablet 50 mg, Daily  magnesium oxide (MAG-OX) tablet 400 mg, BID  nitroGLYCERIN (NITROSTAT) SL tablet 0.4 mg, Q5 Min PRN  pantoprazole (PROTONIX) tablet 40 mg, BID  topiramate (TOPAMAX) tablet 100 mg, Nightly  vitamin B-12 (CYANOCOBALAMIN) tablet 50 mcg, Daily  sodium chloride flush 0.9 % injection 10 mL, 2 times per day  sodium chloride flush 0.9 % injection 10 mL, PRN  0.9 % sodium chloride infusion, PRN  enoxaparin (LOVENOX) injection 40 mg, Daily  acetaminophen (TYLENOL) tablet 650 mg, Q4H PRN  ondansetron (ZOFRAN) injection 4 mg, Q8H PRN  melatonin tablet 10 mg, Nightly  oxyCODONE-acetaminophen (PERCOCET) 5-325 MG per tablet 1 tablet, TID PRN        All medication charted and reviewed.     ALLERGIES     is allergic to robitussin [guaifenesin]; codeine; compazine [prochlorperazine maleate]; iodides; morphine; moxifloxacin; reglan [metoclopramide]; avelox [moxifloxacin hcl in nacl]; cipro xr; and sulfa antibiotics. FAMILY HISTORY     She indicated that her mother is . She indicated that her father is . She indicated that her sister is . She indicated that the status of her maternal grandmother is unknown.     family history includes Diabetes in her maternal grandmother and mother; Emphysema in her sister; Heart Disease in her mother; Stomach Cancer in her father. I have reviewed and agree with the family history entered. I have reviewed the Family History and it is  significant to the case    SOCIAL HISTORY      reports that she quit smoking about 21 years ago. Her smoking use included cigarettes. She has a 123.00 pack-year smoking history. She has never used smokeless tobacco. She reports that she does not drink alcohol or use drugs. I have reviewed and agree with all Social.  There are no concerns for substance abuse/use. PHYSICAL EXAM     INITIAL VITALS:  height is 5' 2\" (1.575 m) and weight is 254 lb (115.2 kg). Her oral temperature is 97.3 °F (36.3 °C). Her blood pressure is 116/70 and her pulse is 78. Her respiration is 18 and oxygen saturation is 91%.       CONSTITUTIONAL: AOx4, no apparent distress, appears stated age    HEAD: normocephalic, atraumatic   EYES: PERRLA, EOMI    ENT: moist mucous membranes, uvula midline   NECK: supple, symmetric   BACK: symmetric   LUNGS: clear to auscultation bilaterally   CARDIOVASCULAR: regular rate and rhythm, no murmurs, rubs or gallops   ABDOMEN: soft, non-tender, non-distended with normal active bowel sounds   NEUROLOGIC:  MAEx4, no focal sensory or motor deficits   MUSCULOSKELETAL: no clubbing, cyanosis or edema   SKIN: no rash or wounds     DIFFERENTIAL DIAGNOSIS/MDM:   Upper airway causes: angioedema, anaphylaxis, infections or FB of the upper airway  Pulmonary causes: COPD/Asthma/Emphysema, Pneumonia, pneumothorax, pulmonary edema, pleural effusion or pulmonary embolism  Cardiac causes: Heart failure, ACS, cardiac arrhythmias  Toxins: Cyanide, Carbon monoxide, drugs, DKA  Neurologic causes: Guillain-Barré , stroke    Chest Pain:   ACS/NSTEMI/STEMI/angina, arrhythmia, trauma, aortic dissection,  PE, PNA, pneumothroax, esophageal rupture, tamponade, Cocaine use, pneumonia, pericarditis, GERD, MSK, Endocarditis, anxiety       DIAGNOSTIC RESULTS     EKG: All EKG's are interpreted by the Observation Physician who either signs or Co-signs this chart in the absence of a cardiologist.    EKG Interpretation    Interpreted by observation physician    Rhythm: normal sinus   Rate: normal  Axis: left  Ectopy: none  Conduction: normal  ST Segments: no acute change  T Waves: no acute change  Q Waves: none    Clinical Impression: no acute changes    Lugenia , DO    RADIOLOGY:   I directly visualized the following  images and reviewed the radiologist interpretations:    Xr Chest Portable    Result Date: 4/5/2021  EXAMINATION: ONE XRAY VIEW OF THE CHEST 4/5/2021 3:12 pm COMPARISON: 10/30/2020 HISTORY: ORDERING SYSTEM PROVIDED HISTORY: midsternal chest pain radiating to back TECHNOLOGIST PROVIDED HISTORY: midsternal chest pain radiating to back Acuity: Unknown Type of Exam: Unknown FINDINGS: Single portable frontal view of the chest is submitted for review. The cardiac silhouette is borderline prominent and there is mild central vascular congestion. No definite focal airspace consolidation, sizeable pleural effusion or pneumothorax. Loop recorder projects overlying the left hemithorax. Trachea is midline. Visualized osseous structures and soft tissues are grossly intact. Borderline cardiomegaly. No acute cardiopulmonary pathology. Ct Chest Pulmonary Embolism W Contrast    Result Date: 4/5/2021  EXAMINATION: CTA OF THE CHEST 4/5/2021 6:50 pm TECHNIQUE: CTA of the chest was performed after the administration of intravenous contrast.  Multiplanar reformatted images are provided for review.   MIP images are provided for review. Dose modulation, iterative reconstruction, and/or weight based adjustment of the mA/kV was utilized to reduce the radiation dose to as low as reasonably achievable. COMPARISON: 03/09/2021 HISTORY: ORDERING SYSTEM PROVIDED HISTORY: concern for PE vs dissection TECHNOLOGIST PROVIDED HISTORY: concern for PE vs dissection Decision Support Exception->Emergency Medical Condition ([place]) Reason for Exam: concern for PE vs dissection Acuity: Acute Type of Exam: Initial FINDINGS: Pulmonary Arteries: Pulmonary arteries are adequately opacified for evaluation. No evidence of intraluminal filling defect to suggest pulmonary embolism. Main pulmonary artery is dilated to 3.5 cm. Mediastinum: No evidence of mediastinal lymphadenopathy. The heart and pericardium demonstrate no acute abnormality. There is no acute abnormality of the thoracic aorta. Coronary artery atherosclerosis. Lungs/pleura: There is no pleural effusion or pneumothorax. There is medial atelectasis in the right upper lobe, similar to prior examination. No new pulmonary consolidation. 8 mm noncalcified right middle lobe nodule is unchanged. Upper Abdomen: No acute abnormality within the visualized upper abdomen. Small hiatal hernia. Soft Tissues/Bones: No acute bone or soft tissue abnormality. 1. No pulmonary embolus. 2. Dilation of the main pulmonary artery, concerning for pulmonary artery hypertension. 3. 8 mm right middle lobe nodule is unchanged in the interval.       LABS:  I have reviewed and interpreted all available lab results.   Labs Reviewed   CBC WITH AUTO DIFFERENTIAL - Abnormal; Notable for the following components:       Result Value    Immature Granulocytes 1 (*)     All other components within normal limits   COMPREHENSIVE METABOLIC PANEL - Abnormal; Notable for the following components:    Glucose 279 (*)     CREATININE 1.04 (*)     Sodium 133 (*)     Total Bilirubin 0.21 (*)     GFR Non- 52 (*)     All other components within normal limits   POC GLUCOSE FINGERSTICK - Abnormal; Notable for the following components:    POC Glucose 272 (*)     All other components within normal limits   POC GLUCOSE FINGERSTICK - Abnormal; Notable for the following components:    POC Glucose 307 (*)     All other components within normal limits   POC GLUCOSE FINGERSTICK - Abnormal; Notable for the following components:    POC Glucose 454 (*)     All other components within normal limits   POC GLUCOSE FINGERSTICK - Abnormal; Notable for the following components:    POC Glucose 299 (*)     All other components within normal limits   TROPONIN   D-DIMER, QUANTITATIVE   BRAIN NATRIURETIC PEPTIDE   TROPONIN   TROPONIN   POCT GLUCOSE   POCT GLUCOSE   POCT GLUCOSE       SCREENING TOOLS:    Heart score for ACS = 6  1. History - 1  Slightly suspicious: 0  Moderately suspicious: 1  Highly suspicious: 2  2. EKG - 1  Normal: 0  Non-specific repolarization disturbance:1  Significant ST depression: 2  3. Age - 2  <45: 0  45-64: 1  >65: 2  4. Risk Factors - 2  None known: 0  1-2: 1  >3: 2  5. Initial Troponin - 0  Normal limit: 0  1-3 x normal limit: 1  >3 x normal limit: 2    Percent Risk for Major Adverse Cardiac Event (MACE)  0-3 pts indicates low risk for MACE   2.5% (DISCHARGE)   4-7 pts indicates moderate risk for MACE  20.3% (OBS)  8-10 pts indicates high risk for MACE  72.7% (EARLY INVASIVE [place])    CDU MEETA / Bimal Onofre is a 69 y.o. female who presents with :    Chest pain/shortness of breath  -CTA negative for PE/dissection  -Cardiology consulted, appreciate recommendations    CKD  -Creatinine 1.04 (at baseline)    · Continue home medications and pain control  · Monitor vitals, labs, and imaging  · DISPO: pending consults and clinical improvement    CONSULTS:    IP CONSULT TO CARDIOLOGY    PROCEDURES:  Not indicated       PATIENT REFERRED TO:    No follow-up provider specified.     --  Baljinder Rojas, DO   Emergency

## 2021-04-07 ENCOUNTER — TELEPHONE (OUTPATIENT)
Dept: FAMILY MEDICINE CLINIC | Age: 71
End: 2021-04-07

## 2021-04-07 NOTE — TELEPHONE ENCOUNTER
[facility] Transitions Initial Follow Up Call    Outreach made within 2 business days of discharge: Yes    Patient: Nicholas Quinn Patient : 1950   MRN: [id number]  Reason for Admission: There are no discharge diagnoses documented for the most recent discharge. Discharge Date: 21       Spoke with: [address] [place] [address],[address]    Discharge department/facility: [address] [place] [address] Interactive Patient Contact:  Was patient able to fill all prescriptions: Yes  Was patient instructed to bring all medications to the follow-up visit: Yes  Is patient taking all medications as directed in the discharge summary?  Yes  Does patient understand their discharge instructions: Yes  Does patient have questions or concerns that need addressed prior to 7-14 day follow up office visit: no    Scheduled appointment with PCP within 7-14 days  NO TO APPOINTMENT   Follow Up  Future Appointments   Date Time Provider Clarence Mas   2021 11:30 AM Cordelia Xiong, [address]   2021  2:00 PM Viviana Rm MD [address]   2021 10:00 AM Earl Marie APRN - CNP [address]   2021 10:15 AM Manohar Burns MD [facility] [address], [place]

## 2021-04-14 RX ORDER — CLOPIDOGREL BISULFATE 75 MG/1
TABLET ORAL
Qty: 90 TABLET | Refills: 1 | Status: SHIPPED | OUTPATIENT
Start: 2021-04-14 | End: 2021-10-25

## 2021-04-14 NOTE — TELEPHONE ENCOUNTER
Please Approve or Refuse.   Send to Pharmacy per Pt's Request:      Next Visit Date:  5/25/2021   Last Visit Date: 2/25/2021    Hemoglobin A1C (%)   Date Value   02/25/2021 7.8   09/14/2020 10.6   02/12/2020 8.4             ( goal A1C is < 7)   BP Readings from Last 3 Encounters:   04/06/21 116/70   03/02/21 (!) 100/57   03/02/21 (!) 142/72          (goal 120/80)  BUN   Date Value Ref Range Status   04/05/2021 19 8 - 23 mg/dL Final     CREATININE   Date Value Ref Range Status   04/05/2021 1.04 (H) 0.50 - 0.90 mg/dL Final     Potassium   Date Value Ref Range Status   04/05/2021 4.2 3.7 - 5.3 mmol/L Final

## 2021-04-16 NOTE — TELEPHONE ENCOUNTER
Please Approve or Refuse.   Send to Pharmacy per Pt's Request: Lantus no longer covered but      Next Visit Date:  5/25/2021   Last Visit Date: 2/25/2021    Hemoglobin A1C (%)   Date Value   02/25/2021 7.8   09/14/2020 10.6   02/12/2020 8.4             ( goal A1C is < 7)   BP Readings from Last 3 Encounters:   04/06/21 116/70   03/02/21 (!) 100/57   03/02/21 (!) 142/72          (goal 120/80)  BUN   Date Value Ref Range Status   04/05/2021 19 8 - 23 mg/dL Final     CREATININE   Date Value Ref Range Status   04/05/2021 1.04 (H) 0.50 - 0.90 mg/dL Final     Potassium   Date Value Ref Range Status   04/05/2021 4.2 3.7 - 5.3 mmol/L Final

## 2021-04-19 ENCOUNTER — OFFICE VISIT (OUTPATIENT)
Dept: PODIATRY | Age: 71
End: 2021-04-19
Payer: COMMERCIAL

## 2021-04-19 ENCOUNTER — TELEPHONE (OUTPATIENT)
Dept: FAMILY MEDICINE CLINIC | Age: 71
End: 2021-04-19

## 2021-04-19 VITALS — HEIGHT: 62 IN | BODY MASS INDEX: 46.38 KG/M2 | WEIGHT: 252 LBS

## 2021-04-19 DIAGNOSIS — E11.51 TYPE 2 DIABETES MELLITUS WITH PERIPHERAL VASCULAR DISEASE (HCC): ICD-10-CM

## 2021-04-19 DIAGNOSIS — B35.1 ONYCHOMYCOSIS OF TOENAIL: Primary | ICD-10-CM

## 2021-04-19 DIAGNOSIS — M79.675 PAIN OF TOES OF BOTH FEET: ICD-10-CM

## 2021-04-19 DIAGNOSIS — M79.674 PAIN OF TOES OF BOTH FEET: ICD-10-CM

## 2021-04-19 PROCEDURE — 11721 DEBRIDE NAIL 6 OR MORE: CPT | Performed by: PODIATRIST

## 2021-04-19 ASSESSMENT — ENCOUNTER SYMPTOMS
COLOR CHANGE: 0
DIARRHEA: 0
NAUSEA: 0
BACK PAIN: 0
SHORTNESS OF BREATH: 0

## 2021-04-19 NOTE — PROGRESS NOTES
SUBJECTIVE: Obdulio Calero is a 69 y.o. female who returns to the office with chief complaint of painful fungal toenails. Patient relates toe nails are thickened/difficult to trim as well as painful with ambulation and with shoe gear. Chief Complaint   Patient presents with    Nail Problem     b/l nail trim/ last seen Dr. Emily Campuzano 2/25/2021    Other     last a1c 7.8     Review of Systems   Constitutional: Negative for activity change, appetite change, chills, diaphoresis, fatigue and fever. Respiratory: Negative for shortness of breath. Cardiovascular: Negative for leg swelling. Gastrointestinal: Negative for diarrhea and nausea. Endocrine: Negative for cold intolerance, heat intolerance and polyuria. Musculoskeletal: Positive for arthralgias. Negative for back pain, gait problem, joint swelling and myalgias. Skin: Negative for color change, pallor, rash and wound. Allergic/Immunologic: Negative for environmental allergies and food allergies. Neurological: Negative for dizziness, weakness, light-headedness and numbness. Hematological: Does not bruise/bleed easily. Psychiatric/Behavioral: Negative for behavioral problems, confusion and self-injury. The patient is not nervous/anxious. OBJECTIVE: Clinical evaluation of patient reveals nails 1,2,3,4,5 of the right foot and nails 1,2,3,4,5, of the left foot to present with thickness, elongation, discoloration, brittleness, and subungual debris. There was pain with palpation and debridement of the toenails of the bilateral feet. No open lesions noted to either foot today. The right DP pulse is not palpable. The left DP pulse is palpable. The right PT pulse is not palpable. The left PT pulse is not palpable. Protective sensation is present to the right plantar foot as noted with a 5.07 [place] monofilament.    Protective sensation is present to the left plantar foot as noted with a 5.07 [place] monofilament. HbA1c: 7.8 %. Class A Findings (1 needed)   [] Non-traumatic amputation of foot or integral skeleton portion thereof. [] Q7.      Class B Findings (2 needed)   1. [x] Absent posterior tibial pulse   2. [x] Absent dorsalis pedis pulse   3. [] Advanced trophic changes; three of the following are required:   ·         [] hair growth (decrease or absence)   ·         [] nail changes (thickening)   ·         [] pigmentary changes (discoloration)   ·         [] skin texture (thin, shiny)   ·         [] skin color (rubor or redness)   [x] Q8.      Class C Findings (1 Class B, 2 Class C needed)   1. [] Claudication   2. [] Temperature changes   3. [] Edema   4. [] Paresthesia   5. [] Burning   [] Q9.     ASSESSMENT:    Diagnosis Orders   1. Onychomycosis of toenail  [place] DEBRIDEMENT OF NAILS, 6 OR MORE    HM DIABETES FOOT EXAM   2. Pain of toes of both feet  [place] DEBRIDEMENT OF NAILS, 6 OR MORE    HM DIABETES FOOT EXAM   3. Type 2 diabetes mellitus with peripheral vascular disease (HCC)  [place] DEBRIDEMENT OF NAILS, 6 OR MORE    HM DIABETES FOOT EXAM     PLAN: Toenails 1,2,3,4,5 of the right foot and 1,2,3,4,5 of the left foot were debrided in length and thickness using a nail nipper and a . Return in about 9 weeks (around 6/21/2021) for At risk diabetic foot care.    4/19/2021      Cielo Atkins DPM

## 2021-04-20 ENCOUNTER — TELEPHONE (OUTPATIENT)
Dept: PHARMACY | Age: 71
End: 2021-04-20

## 2021-04-21 DIAGNOSIS — E11.42 TYPE 2 DIABETES MELLITUS WITH DIABETIC POLYNEUROPATHY, WITH LONG-TERM CURRENT USE OF INSULIN (HCC): Chronic | ICD-10-CM

## 2021-04-21 DIAGNOSIS — Z79.4 TYPE 2 DIABETES MELLITUS WITH DIABETIC POLYNEUROPATHY, WITH LONG-TERM CURRENT USE OF INSULIN (HCC): Chronic | ICD-10-CM

## 2021-04-21 RX ORDER — GLUCOSAMINE HCL/CHONDROITIN SU 500-400 MG
CAPSULE ORAL
Qty: 100 STRIP | Refills: 0 | Status: SHIPPED | OUTPATIENT
Start: 2021-04-21 | End: 2021-06-10

## 2021-04-21 NOTE — TELEPHONE ENCOUNTER
Patient states she just started the Basaglar 2 nights ago and states her blood glucose has been in the 400-500's, would drop to the 200's overnight, drop to the 100's after eating breakfast and going to the bathroom. She states it is all over the place and has been taking the Novolog with it. She states it is making her feel sick and does not want to continue on like this with the way her blood glucose has been. She states it is not working like the Lantus did. She states she started having a headache this morning when she woke up. She states she does keep track of the results when she checks her glucose. Please advise. Please Approve or Refuse.   Send to Pharmacy per Pt's Request:     Next Visit Date:  5/25/2021   Last Visit Date: 2/25/2021    Hemoglobin A1C (%)   Date Value   02/25/2021 7.8   09/14/2020 10.6   02/12/2020 8.4             ( goal A1C is < 7)   BP Readings from Last 3 Encounters:   04/06/21 116/70   03/02/21 (!) 100/57   03/02/21 (!) 142/72          (goal 120/80)  BUN   Date Value Ref Range Status   04/05/2021 19 8 - 23 mg/dL Final     CREATININE   Date Value Ref Range Status   04/05/2021 1.04 (H) 0.50 - 0.90 mg/dL Final     Potassium   Date Value Ref Range Status   04/05/2021 4.2 3.7 - 5.3 mmol/L Final

## 2021-04-21 NOTE — TELEPHONE ENCOUNTER
Patient informed. I advised to give it a couple days to see if it works at the 50U twice daily with the sliding scale, keep track of the results, and if it's continuing to not work to call us back.

## 2021-04-21 NOTE — TELEPHONE ENCOUNTER
Pt should be on basalglar 50 U twice daily and sliding also. If she is taking basaglar twice daily and still sh ei shavinghigh BS, we will change it to lantus. It was insurance which was not covering and this 2nd patient who has issues with basaglar. Let me know .

## 2021-04-23 RX ORDER — ASPIRIN 81 MG/1
TABLET, COATED ORAL
Qty: 90 TABLET | Refills: 3 | Status: SHIPPED | OUTPATIENT
Start: 2021-04-23 | End: 2021-11-24

## 2021-04-26 ENCOUNTER — OFFICE VISIT (OUTPATIENT)
Dept: GASTROENTEROLOGY | Age: 71
End: 2021-04-26
Payer: COMMERCIAL

## 2021-04-26 VITALS
HEART RATE: 76 BPM | WEIGHT: 250 LBS | DIASTOLIC BLOOD PRESSURE: 65 MMHG | BODY MASS INDEX: 46.01 KG/M2 | SYSTOLIC BLOOD PRESSURE: 122 MMHG | HEIGHT: 62 IN

## 2021-04-26 DIAGNOSIS — E66.01 MORBID OBESITY (HCC): ICD-10-CM

## 2021-04-26 DIAGNOSIS — K63.5 SESSILE COLONIC POLYP: ICD-10-CM

## 2021-04-26 DIAGNOSIS — D12.2 ADENOMATOUS POLYP OF ASCENDING COLON: ICD-10-CM

## 2021-04-26 DIAGNOSIS — K21.9 GASTROESOPHAGEAL REFLUX DISEASE, UNSPECIFIED WHETHER ESOPHAGITIS PRESENT: ICD-10-CM

## 2021-04-26 DIAGNOSIS — R14.0 ABDOMINAL BLOATING: Primary | ICD-10-CM

## 2021-04-26 DIAGNOSIS — K58.2 IRRITABLE BOWEL SYNDROME WITH BOTH CONSTIPATION AND DIARRHEA: ICD-10-CM

## 2021-04-26 PROCEDURE — G8399 PT W/DXA RESULTS DOCUMENT: HCPCS | Performed by: INTERNAL MEDICINE

## 2021-04-26 PROCEDURE — 1090F PRES/ABSN URINE INCON ASSESS: CPT | Performed by: INTERNAL MEDICINE

## 2021-04-26 PROCEDURE — 1123F ACP DISCUSS/DSCN MKR DOCD: CPT | Performed by: INTERNAL MEDICINE

## 2021-04-26 PROCEDURE — 3017F COLORECTAL CA SCREEN DOC REV: CPT | Performed by: INTERNAL MEDICINE

## 2021-04-26 PROCEDURE — G8417 CALC BMI ABV UP PARAM F/U: HCPCS | Performed by: INTERNAL MEDICINE

## 2021-04-26 PROCEDURE — 4040F PNEUMOC VAC/ADMIN/RCVD: CPT | Performed by: INTERNAL MEDICINE

## 2021-04-26 PROCEDURE — G8427 DOCREV CUR MEDS BY ELIG CLIN: HCPCS | Performed by: INTERNAL MEDICINE

## 2021-04-26 PROCEDURE — 99213 OFFICE O/P EST LOW 20 MIN: CPT | Performed by: INTERNAL MEDICINE

## 2021-04-26 PROCEDURE — 1036F TOBACCO NON-USER: CPT | Performed by: INTERNAL MEDICINE

## 2021-04-26 ASSESSMENT — ENCOUNTER SYMPTOMS
TROUBLE SWALLOWING: 0
VOICE CHANGE: 0
ABDOMINAL PAIN: 1
CHOKING: 0
ABDOMINAL DISTENTION: 0
COUGH: 1
DIARRHEA: 0
CONSTIPATION: 0
VOMITING: 0
NAUSEA: 0
SORE THROAT: 0

## 2021-04-26 NOTE — PROGRESS NOTES
GI OFFICE FOLLOW UP    Rogelio Mackey is a 69 y.o. female evaluated via on 4/26/2021. Consent:  She and/or health care decision maker is aware that that she may receive a bill for this telephone service, depending on her insurance coverage, and has provided verbal consent to proceed: YES      INTERVAL HISTORY:   No referring provider defined for this encounter. Chief Complaint   Patient presents with    Follow-up     Patient is a colon/egd f/u. Patient denies new symptoms, notes still geting the abd cramps in her stomach. 1. Abdominal bloating    2. Irritable bowel syndrome with both constipation and diarrhea    3. Adenomatous polyp of ascending colon    4. Morbid obesity ([address]    5. Gastroesophageal reflux disease, unspecified whether esophagitis present    6. Sessile colonic polyp       The patient is here as a follow up of her recent GI procedure. The results have been sent to you separately   The findings were explained to the patient in detail and biopsies were also discussed   with her    She had a recent EGD and colonoscopy by myself  Some esophagitis and gastritis was noted  Inflammatory-like polyp was removed  Overall feeling better  Has morbid obesity  Has history for chronic irritable bowel syndrome-like symptoms has been on gabapentin  Multiple medical issues  Narcotic drug usage  Taking PPI for GERD  Denies any dysphagia denies any rectal bleeding denies any melanotic stools      No obvious nausea vomiting hematemesis  No smoking alcohol abuse illicit drug usage      HISTORY OF PRESENT ILLNESS: Ms.Charlotte Wen Hodges is a 69 y.o. female with a past history remarkable for , referred for evaluation of   Chief Complaint   Patient presents with    Follow-up     Patient is a colon/egd f/u. Patient denies new symptoms, notes still geting the abd cramps in her stomach.     .    Past Laterality Date    ABLATION OF DYSRHYTHMIC FOCUS  2000    CARPAL TUNNEL RELEASE Right     CATARACT REMOVAL WITH IMPLANT Bilateral     CHOLECYSTECTOMY  2007    COLONOSCOPY      COLONOSCOPY  04/10/2017    tubular adenomo polyp , mod. sigmoid diverticulosis, min. int. hemorrhoids    COLONOSCOPY N/A 3/2/2021    COLONOSCOPY WITH BIOPSY performed by Anneliese Juares MD at [place] [address]  9/25/2013    DILATION AND CURETTAGE  1979    EYE SURGERY      laser    INCONTINENCE SURGERY      Percutaneous nerve stimulation Dr Leighton Lozano Nov 2013     INSERTABLE CARDIAC MONITOR  12/04/2015    [employer] REVEAL LINQ MODEL #[id number] MRI CONDTIONAL OK 3T. IMMEDIATELY POST IMPLANT    OTHER SURGICAL HISTORY  09/10/15    removal of interstim    [place] COLSC FLX W/REMOVAL LESION BY HOT BX FORCEPS N/A 4/10/2017    COLONOSCOPY POLYPECTOMY HOT BIOPSY performed by Omari Chen MD at [address] TYMPANOPLASTY      TYMPANOPLASTY      TYMPANOSTOMY TUBE PLACEMENT  08/21/2017    UPPER GASTROINTESTINAL ENDOSCOPY  03/2016    Dr Carmen Torres N/A 3/2/2021    EGD BIOPSY performed by Anneliese Juares MD at [address] [place] [address] Street:    Current Outpatient Medications:     ASPIRIN LOW DOSE 81 MG EC tablet, TAKE 1 TAB BY MOUTH ONCE A DAY , Disp: 90 tablet, Rfl: 3    blood glucose monitor strips, Test 2-3 times a day & as needed for symptoms of irregular blood glucose. Please fill for freestyle test strips, Disp: 100 strip, Rfl: 0    insulin glargine (LANTUS;BASAGLAR) 100 UNIT/ML injection pen, Inject 42 units into the skin 2 times daily. , Disp: 5 pen, Rfl: 3    clopidogrel (PLAVIX) 75 MG tablet, TAKE 1 TAB BY MOUTH ONCE A DAY ( IN THE MORNING ) -THIS MEDICINE MAY BE TAKENWITH OR WITHOUT FOOD , Disp: 90 tablet, Rfl: 1    Blood Glucose Monitoring Suppl (ONE TOUCH ULTRA 2) w/Device KIT, , Disp: , Rfl:     ofloxacin (OCUFLOX) 0.3 % solution, , Disp: , Rfl:    oxyCODONE-acetaminophen (PERCOCET) 5-325 MG per tablet, Take 1 tablet by mouth See Admin Instructions for 30 days. Intended supply: 30 days.  1 tab 3-4 times a day prn, Disp: 100 tablet, Rfl: 0    LANTUS SOLOSTAR 100 UNIT/ML injection pen, INJECT 42 UNITS INTO THE SKIN 2 TIMES DAILY , Disp: 15 mL, Rfl: 3    ONETOUCH ULTRA strip, TEST TWO (2) TO THREE (3) TIMES A DAY& AS NEEDED , Disp: 100 each, Rfl: 0    insulin aspart (NOVOLOG FLEXPEN) 100 UNIT/ML injection pen, IF <139 - NO INSULIN; 140-199-2 UNITS;200-249-4 UNITS;250-299-6 UNITS;300-349-8 UNITS;350-400=10 UNITS;ABOVE 400-12 UNITS, Disp: 15 mL, Rfl: 3    atorvastatin (LIPITOR) 40 MG tablet, Take 1 tablet by mouth daily, Disp: 90 tablet, Rfl: 3    Multiple Vitamins-Minerals (THERAPEUTIC MULTIVITAMIN-MINERALS) tablet, Take 1 tablet by mouth daily Takes Women over 50 Complete Vitamin daily ([employer] brand), Disp: , Rfl:     BiPAP Machine MISC, repair/replace Bipap maintain 18/9CMH2O, Cflex=3,mask,tubing,filters,headgear,heated humidifier,all supplies PRN, Disp: , Rfl:     Psyllium (METAMUCIL PO), Take by mouth 3 times daily Takes powder, Disp: , Rfl:     blood glucose test strips (TRUE METRIX BLOOD GLUCOSE TEST) strip, THREE TIMES A DAY, Disp: 100 each, Rfl: 3    ferrous sulfate (IRON 325) 325 (65 Fe) MG tablet, TAKE 1 TAB BY MOUTH EVERY MORNING , Disp: 90 tablet, Rfl: 5    citalopram (CELEXA) 40 MG tablet, TAKE 1 2 TABLET BY MOUTH TWICE A DAY (Patient taking differently: Take 20 mg by mouth 2 times daily ), Disp: 90 tablet, Rfl: 3    carvedilol (COREG) 3.125 MG tablet, TAKE 1 TAB BY MOUTH TWICE A DAY ( IN THE MORNING AND BEDTIME ) , Disp: 180 tablet, Rfl: 3    losartan (COZAAR) 25 MG tablet, TAKE 1 TAB BY MOUTH ONCE A DAY ( IN THE MORNING ), Disp: 90 tablet, Rfl: 5    magnesium oxide (MAG-OX) 400 MG tablet, TAKE 1 TAB BY MOUTH TWICE A DAY ( IN THE MORNING AND BEDTIME ), Disp: 180 tablet, Rfl: 3    topiramate (TOPAMAX) 100 MG tablet, Take 1 tablet by mouth nightly, Disp: 90 tablet, Rfl: 2    dicyclomine (BENTYL) 10 MG capsule, Take 1 capsule by mouth 2 times daily as needed (abdominal spasm), Disp: 180 capsule, Rfl: 0    albuterol (PROVENTIL) (2.5 MG/3ML) 0.083% nebulizer solution, Take 3 mLs by nebulization every 6 hours as needed for Wheezing, Disp: 120 each, Rfl: 3    furosemide (LASIX) 20 MG tablet, TAKE 1 TAB BY MOUTH ONCE A DAY AS NEEDED ( WEIGHT GAIN 3 LBS OVERNIGHT ) , Disp: 30 tablet, Rfl: 3    zoster recombinant adjuvanted vaccine (SHINGRIX) 50 MCG/0.5ML SUSR injection, 50 MCG IM then repeat 2-6 months., Disp: 0.5 mL, Rfl: 1    Icosapent Ethyl (VASCEPA) 1 g CAPS capsule, Take 1 capsule by mouth 2 times daily, Disp: 60 capsule, Rfl: 3    vitamin B-12 (CYANOCOBALAMIN) 100 MCG tablet, Take 50 mcg by mouth daily, Disp: , Rfl:     isosorbide dinitrate (ISORDIL) 30 MG tablet, Take 30 mg by mouth, Disp: , Rfl:     ULTICARE MINI PEN NEEDLES 31G X 6 MM MISC, USE AS DIRECTED , Disp: 100 each, Rfl: 5    metFORMIN (GLUCOPHAGE) 1000 MG tablet, TAKE 1 TAB BY MOUTH TWICE A DAY ( IN THE MORNING AND BEDTIME ) , Disp: 180 tablet, Rfl: 3    nystatin (MYCOSTATIN) 053955 UNIT/GM powder, Apply 3 times daily. , Disp: 3 Bottle, Rfl: 3    aspirin 81 MG chewable tablet, Take 1 tablet by mouth daily, Disp: 90 tablet, Rfl: 3    lidocaine (LMX) 4 % cream, Apply topically every 8 hrs as needed for pain, Disp: 45 g, Rfl: 3    Lift Chair MISC, by Does not apply route, Disp: 1 each, Rfl: 0    Misc.  Devices (ADJUST BATH/SHOWER SEAT/BACK) MISC, Use daily for shower, Disp: 1 each, Rfl: 0    pantoprazole (PROTONIX) 40 MG tablet, Take 1 tablet by mouth 2 times daily, Disp: 60 tablet, Rfl: 3    Alcohol Swabs (B-D SINGLE USE SWABS REGULAR) PADS, THREE TIMES A DAY, Disp: 100 each, Rfl: 0    nitroGLYCERIN (NITROSTAT) 0.4 MG SL tablet, 1 under the tongue as needed for angina, may repeat q5mins for up three doses, Disp: , Rfl:     Blood Glucose Monitoring Suppl HARMEET, Use daily to check BS, Disp: 1 Device, Rfl: 0    ipratropium-albuterol (DUONEB) 0.5-2.5 (3) MG/3ML SOLN nebulizer solution, Inhale 3 mLs into the lungs every 4 hours, Disp: 360 mL, Rfl: 2    Melatonin 10 MG TABS, Take 10 mg by mouth nightly, Disp: 90 tablet, Rfl: 3    Compression Stockings MISC, by Does not apply route Pressure between 20 - 25 ., Disp: 1 each, Rfl: 0    docusate sodium (COLACE) 100 MG capsule, Take 1 capsule by mouth 2 times daily Please use while taking Percocet, Disp: 30 capsule, Rfl: 0    SYMBICORT 160-4.5 MCG/ACT AERO,  2 puffs As needed for sob, Disp: , Rfl:     OXYGEN, Inhale 3 L into the lungs , Disp: , Rfl:     gabapentin (NEURONTIN) 300 MG capsule, Take 1 capsule by mouth 3 times daily for 30 days. , Disp: 90 capsule, Rfl: 2    ALLERGIES:   Allergies   Allergen Reactions    Robitussin [Guaifenesin] Anaphylaxis    Codeine Swelling    Compazine [Prochlorperazine Maleate] Hives and Swelling    Iodides Itching    Morphine Other (See Comments)     hallucinations    Moxifloxacin      Other reaction(s): Intolerance-unknown  Other reaction(s):  Intolerance-unknown    Reglan [Metoclopramide] Nausea Only    Avelox [Moxifloxacin Hcl In Nacl] Rash     Dermatitis      Cipro Xr Rash     dermatitis    Sulfa Antibiotics Rash       FAMILY HISTORY:       Problem Relation Age of Onset    Diabetes Mother     Heart Disease Mother     Stomach Cancer Father     Diabetes Maternal Grandmother         also aunts and uncles (maternal)    Emphysema Sister          SOCIAL HISTORY:   Social History     Socioeconomic History    Marital status: Legally      Spouse name: Not on file    Number of children: Not on file    Years of education: Not on file    Highest education level: Not on file   Occupational History    Occupation: disabled     Employer: N/A   Social Needs    Financial resource strain: Not on file    Food insecurity     Worry: Not on file     Inability: Not on file   FrameBlast scooter    HENT:      Head: Normocephalic and atraumatic. Eyes:      Conjunctiva/sclera: Conjunctivae normal.      Pupils: Pupils are equal, round, and reactive to light. Neck:      Musculoskeletal: Normal range of motion and neck supple. Cardiovascular:      Heart sounds: Normal heart sounds. Pulmonary:      Effort: Pulmonary effort is normal.      Breath sounds: Rales present. Abdominal:      General: Bowel sounds are normal.      Palpations: Abdomen is soft. Comments: Obese belly   Non tender   Musculoskeletal: Normal range of motion. Right lower leg: Edema present. Left lower leg: Edema present. Skin:     General: Skin is warm. Neurological:      Mental Status: She is alert and oriented to person, place, and time.    Psychiatric:         Behavior: Behavior normal.           LABORATORY DATA: Reviewed  Lab Results   Component Value Date    WBC 8.4 04/05/2021    HGB 12.3 04/05/2021    HCT 38.5 04/05/2021    MCV 97.5 04/05/2021     04/05/2021     (L) 04/05/2021    K 4.2 04/05/2021    CL 98 04/05/2021    CO2 26 04/05/2021    BUN 19 04/05/2021    CREATININE 1.04 (H) 04/05/2021    LABPROT 7.0 09/06/2012    LABALBU 3.7 04/05/2021    BILITOT 0.21 (L) 04/05/2021    ALKPHOS 59 04/05/2021    AST 31 04/05/2021    ALT 29 04/05/2021    INR 1.0 10/31/2019         Lab Results   Component Value Date    RBC 3.95 04/05/2021    HGB 12.3 04/05/2021    MCV 97.5 04/05/2021    MCH 31.1 04/05/2021    MCHC 31.9 04/05/2021    RDW 12.8 04/05/2021    MPV 10.9 04/05/2021    BASOPCT 1 04/05/2021    LYMPHSABS 2.14 04/05/2021    MONOSABS 0.82 04/05/2021    NEUTROABS 5.11 04/05/2021    EOSABS 0.19 04/05/2021    BASOSABS 0.05 04/05/2021         DIAGNOSTIC TESTING:     Xr Chest Portable    Result Date: 4/5/2021  EXAMINATION: ONE XRAY VIEW OF THE CHEST 4/5/2021 3:12 pm COMPARISON: 10/30/2020 HISTORY: ORDERING SYSTEM PROVIDED HISTORY: midsternal chest pain radiating to back TECHNOLOGIST PROVIDED HISTORY: midsternal chest pain radiating to back Acuity: Unknown Type of Exam: Unknown FINDINGS: Single portable frontal view of the chest is submitted for review. The cardiac silhouette is borderline prominent and there is mild central vascular congestion. No definite focal airspace consolidation, sizeable pleural effusion or pneumothorax. Loop recorder projects overlying the left hemithorax. Trachea is midline. Visualized osseous structures and soft tissues are grossly intact. Borderline cardiomegaly. No acute cardiopulmonary pathology. Ct Chest Pulmonary Embolism W Contrast    Result Date: 4/5/2021  EXAMINATION: CTA OF THE CHEST 4/5/2021 6:50 pm TECHNIQUE: CTA of the chest was performed after the administration of intravenous contrast.  Multiplanar reformatted images are provided for review. MIP images are provided for review. Dose modulation, iterative reconstruction, and/or weight based adjustment of the mA/kV was utilized to reduce the radiation dose to as low as reasonably achievable. COMPARISON: 03/09/2021 HISTORY: ORDERING SYSTEM PROVIDED HISTORY: concern for PE vs dissection TECHNOLOGIST PROVIDED HISTORY: concern for PE vs dissection Decision Support Exception->Emergency Medical Condition (MA) Reason for Exam: concern for PE vs dissection Acuity: Acute Type of Exam: Initial FINDINGS: Pulmonary Arteries: Pulmonary arteries are adequately opacified for evaluation. No evidence of intraluminal filling defect to suggest pulmonary embolism. Main pulmonary artery is dilated to 3.5 cm. Mediastinum: No evidence of mediastinal lymphadenopathy. The heart and pericardium demonstrate no acute abnormality. There is no acute abnormality of the thoracic aorta. Coronary artery atherosclerosis. Lungs/pleura: There is no pleural effusion or pneumothorax. There is medial atelectasis in the right upper lobe, similar to prior examination. No new pulmonary consolidation.   8 mm noncalcified right middle lobe nodule is unchanged. Upper Abdomen: No acute abnormality within the visualized upper abdomen. Small hiatal hernia. Soft Tissues/Bones: No acute bone or soft tissue abnormality. 1. No pulmonary embolus. 2. Dilation of the main pulmonary artery, concerning for pulmonary artery hypertension. 3. 8 mm right middle lobe nodule is unchanged in the interval.     Nm Cardiac Stress Test Nuclear Imaging    Result Date: 4/6/2021  EXAMINATION: MYOCARDIAL PERFUSION IMAGING 4/6/2021 10:33 am TECHNIQUE: Rest dose:  15 mCi Tc-99m sestamibi intravenously Stress dose:  40 mCi Tc-99m sestamibi intravenously Under cardiology supervision, 0.4 mg Lexiscan was infused intravenously prior to injection of the stress dose. SPECT imaging was acquired following injection of the sestamibi. ECG gating was obtained following the stress acquisition. COMPARISON: 02/04/2020 HISTORY: ORDERING SYSTEM PROVIDED HISTORY: Chest pain TECHNOLOGIST PROVIDED HISTORY: Reason for Exam: Chest pain Procedure Type->Rx chest pain 54-year-old female with chest pain FINDINGS: The patient achieved a maximum heart rate of 86 beats per minute, 57 % of the maximum age predicted heart rate of 149 beats per minute. Perfusion: There is no scintigraphic evidence for a reversible or fixed perfusion defect to suggest reversible ischemia or infarct. Function: The gated SPECT data demonstrates normal left ventricular size and normal wall motion. Left ventricular ejection fraction:  Greater than 70% TID score:  1.08 (threshold value of 1.39 is used for Lexiscan stress with Tc-99m). There is no stress-induced cavitary dilatation to suggest compensated triple vessel disease. End diastolic volume:  13AS Scores are visually adjusted to account for potential artifact. Summed stress score:  0 Summed rest score:  0 Summed reversibility score:  0     1. No definitive scintigraphic evidence for reversible ischemia or infarct. 2. Left ventricular ejection fraction of greater than 70%.  3. Please see report for EKG portion of the examination which will be performed separately by physician from cardiology. Risk stratification:  Low risk Note:  Risk stratification incorporates both clinical history and test results. Final risk determination is the responsibility of the ordering provider as history and other test results may increase or decrease the risk stratification reported for this examination. Risk stratification criteria are adapted from \"Noninvasive Risk Stratification\" criteria from Nancy Cardenas. Al, ACC/AATS/AHA/ASE/ASNC/SCAI/SCCT/STS 2017 Appropriate Use Criteria For Coronary Revascularization in Patients With Stable Ischemic Heart Disease [facility] Volume 69, Issue 17, May 2017 High risk (>3% annual death or MI) 1. Severe resting LV dysfunction (LVEF >35%) not readily explained by non coronary causes 2. Resting perfusion abnormalities greater than 10% of the myocardium in patients without prior history or evidence of MI 3. Stress-induced perfusion abnormalities encumbering greater than or equal to 10% myocardium or stress segmental scores indicating multiple vascular territories with abnormalities 4. Stress-induced LV dilatation (TID ratio greater than 1.19 for exercise and greater than 1.39 for regadenoson) Intermediate risk (1% to 3% annual death or MI) 1. Mild/moderate resting LV dysfunction (LVEF 35% to 49%) not readily explained by non coronary causes. 2.  Resting perfusion abnormalities in 5%-9.9% of the myocardium in patients without a history or prior evidence of MI 3. Stress-induced perfusion abnormality encumbering 5%-9.9% of the myocardium or stress segmental scores indicating 1 vascular territory with abnormalities but without LV dilation 4. Small wall motion abnormality involving 1-2 segments and only 1 coronary bed. Low Risk (Less than 1% annual death or MI) 1. Normal or small myocardial perfusion defect at rest or with stress encumbering less than 5% of the myocardium.  This examination was read in conjunction with the cardiology fellow, Dr. Emigdio King and Dr. Padmini Lyman  1. Abdominal bloating    2. Irritable bowel syndrome with both constipation and diarrhea    3. Adenomatous polyp of ascending colon    4. Morbid obesity ([address]    5. Gastroesophageal reflux disease, unspecified whether esophagitis present    6. Sessile colonic polyp        Plan    Pt seems to have signs and symptoms consistent with GERD, acid indigestion and heartburns. She was discussed  in detail about some possible life style and dietary modifications. She was stressed about the maintenance  of appropriate weight and effect of obesity contributing to reflux symptoms. Routine exercise was streesed. Avoidance of Caffeine, nicotine and chocolate were explained. Pt was asked to avoid spices grease and fried food. Advices were also given about avoidance of any kind of fast foods, soda pops and high energy drinks. Pt was advised to place two small block under the head end of the bed which may help with night time reflux. Was advised not to eat any thin at least 2-3 hrs before going to bed and walk especially after dinner    Pt has verbalized understanding and agreement to this plan. Pt was discussed in detail about the possible side effects of proton pump inhibiter therapy. She was explained about the possibility of calcium and magnesium malabsorption and was advised to start taking calcium supplements with Vit D. Some over the counter regimens were explained to patient. Some dietary advices were also given. She has verbalized understanding and agreement to this. Pt was advised in detail about some life style and dietary modifications. She was advised about avoidance of caffeine, nicotine and chocolate. Pt was also told to stay away from any kind of fast foods, soda pops.  She was also advised to avoid lots of spices, grease and fried food etc.     Instructions were also given about trying to arrange the timing, quality and quantity of food. Instructions were given about using ample amount of fiber including dietary and supplemental fiber either metamucil, bennafiber or citrucell etc.  Pt was advised about drinking ample amount of water without any colors or chemicals. Stress was given about regular exercise. Pt has verbalized understanding and agreement to these modifications. The patient was instructed to start taking some OTC Probiotics products   These are available over the counter at the Pharmacy stores and Grocery stores  He was explained about the beneficial effects they have in the GI track  They will help to establish the good bacterial sue and will help with the digestion and bowel movements  The patient has verbalized understanding and agreement to this plan    More than half of patient's clinic visit time was spent in counseling about lifestyle and dietary modifications  Patient's  questions were answered in this regard as well  The patient has verbalized understanding and agreement     I communicated with the patient and/or health care decision maker about   Details of this discussion including any medical advice provided:YES      I affirm this is a Patient Initiated Episode with an Established Patient who has not had a related appointment within my department in the past 7 days or scheduled within the next 24 hours. Total Time: minutes: 21-30 minutes    Note: not billable if this call serves to triage the patient into an appointment for the relevant concern      Thank you for allowing me to participate in the care of Ms. Abreu. For any further questions please do not hesitate to contact me. I have reviewed and agree with the ROS entered by the MA/LPN.          Manuel Perera MD, [facility]  Board Certified in Gastroenterology and [address] [place] [address] Gastroenterology  Office #: [phone]

## 2021-04-30 ENCOUNTER — APPOINTMENT (OUTPATIENT)
Dept: GENERAL RADIOLOGY | Age: 71
End: 2021-04-30
Payer: COMMERCIAL

## 2021-04-30 ENCOUNTER — HOSPITAL ENCOUNTER (EMERGENCY)
Age: 71
Discharge: HOME | End: 2021-04-30
Attending: EMERGENCY MEDICINE
Payer: COMMERCIAL

## 2021-04-30 ENCOUNTER — HOSPITAL ENCOUNTER (OUTPATIENT)
Dept: PAIN MANAGEMENT | Age: 71
Discharge: HOME | End: 2021-04-30
Payer: COMMERCIAL

## 2021-04-30 VITALS
SYSTOLIC BLOOD PRESSURE: 120 MMHG | OXYGEN SATURATION: 96 % | RESPIRATION RATE: 22 BRPM | TEMPERATURE: 97.4 F | HEART RATE: 68 BPM | DIASTOLIC BLOOD PRESSURE: 76 MMHG

## 2021-04-30 DIAGNOSIS — M54.40 CHRONIC BILATERAL LOW BACK PAIN WITH SCIATICA, SCIATICA LATERALITY UNSPECIFIED: ICD-10-CM

## 2021-04-30 DIAGNOSIS — M47.817 LUMBOSACRAL SPONDYLOSIS WITHOUT MYELOPATHY: Chronic | ICD-10-CM

## 2021-04-30 DIAGNOSIS — M51.36 DDD (DEGENERATIVE DISC DISEASE), LUMBAR: ICD-10-CM

## 2021-04-30 DIAGNOSIS — M54.16 LUMBAR RADICULOPATHY, CHRONIC: ICD-10-CM

## 2021-04-30 DIAGNOSIS — M50.30 DDD (DEGENERATIVE DISC DISEASE), CERVICAL: Chronic | ICD-10-CM

## 2021-04-30 DIAGNOSIS — G89.29 CHRONIC BILATERAL LOW BACK PAIN WITH SCIATICA, SCIATICA LATERALITY UNSPECIFIED: ICD-10-CM

## 2021-04-30 DIAGNOSIS — G62.9 NEUROPATHY: ICD-10-CM

## 2021-04-30 DIAGNOSIS — F11.90 CHRONIC, CONTINUOUS USE OF OPIOIDS: ICD-10-CM

## 2021-04-30 DIAGNOSIS — R07.89 CHEST WALL PAIN: Primary | ICD-10-CM

## 2021-04-30 DIAGNOSIS — M47.892 OTHER OSTEOARTHRITIS OF SPINE, CERVICAL REGION: Chronic | ICD-10-CM

## 2021-04-30 DIAGNOSIS — M47.9 SPONDYLARTHROSIS: Primary | ICD-10-CM

## 2021-04-30 DIAGNOSIS — Z51.81 MEDICATION MONITORING ENCOUNTER: Chronic | ICD-10-CM

## 2021-04-30 DIAGNOSIS — M46.1 SACROILIITIS, NOT ELSEWHERE CLASSIFIED (HCC): Chronic | ICD-10-CM

## 2021-04-30 LAB
ABSOLUTE EOS #: 0.27 K/UL (ref 0–0.44)
ABSOLUTE IMMATURE GRANULOCYTE: 0.03 K/UL (ref 0–0.3)
ABSOLUTE LYMPH #: 1.71 K/UL (ref 1.1–3.7)
ABSOLUTE MONO #: 0.76 K/UL (ref 0.1–1.2)
ALBUMIN SERPL-MCNC: 3.9 G/DL (ref 3.5–5.2)
ALBUMIN/GLOBULIN RATIO: 1.2 (ref 1–2.5)
ALP BLD-CCNC: 55 U/L (ref 35–104)
ALT SERPL-CCNC: 27 U/L (ref 5–33)
ANION GAP SERPL CALCULATED.3IONS-SCNC: 9 MMOL/L (ref 9–17)
AST SERPL-CCNC: 23 U/L
BASOPHILS # BLD: 1 % (ref 0–2)
BASOPHILS ABSOLUTE: 0.06 K/UL (ref 0–0.2)
BILIRUB SERPL-MCNC: 0.16 MG/DL (ref 0.3–1.2)
BUN BLDV-MCNC: 22 MG/DL (ref 8–23)
BUN/CREAT BLD: ABNORMAL (ref 9–20)
CALCIUM SERPL-MCNC: 8.9 MG/DL (ref 8.6–10.4)
CHLORIDE BLD-SCNC: 99 MMOL/L (ref 98–107)
CO2: 25 MMOL/L (ref 20–31)
CREAT SERPL-MCNC: 1.01 MG/DL (ref 0.5–0.9)
DIFFERENTIAL TYPE: ABNORMAL
EOSINOPHILS RELATIVE PERCENT: 4 % (ref 1–4)
GFR AFRICAN AMERICAN: >60 ML/MIN
GFR NON-AFRICAN AMERICAN: 54 ML/MIN
GFR SERPL CREATININE-BSD FRML MDRD: ABNORMAL ML/MIN/{1.73_M2}
GFR SERPL CREATININE-BSD FRML MDRD: ABNORMAL ML/MIN/{1.73_M2}
GLUCOSE BLD-MCNC: 112 MG/DL (ref 70–99)
HCT VFR BLD CALC: 38.4 % (ref 36.3–47.1)
HEMOGLOBIN: 12.1 G/DL (ref 11.9–15.1)
IMMATURE GRANULOCYTES: 0 %
LIPASE: 55 U/L (ref 13–60)
LYMPHOCYTES # BLD: 25 % (ref 24–43)
MCH RBC QN AUTO: 30.8 PG (ref 25.2–33.5)
MCHC RBC AUTO-ENTMCNC: 31.5 G/DL (ref 28.4–34.8)
MCV RBC AUTO: 97.7 FL (ref 82.6–102.9)
MONOCYTES # BLD: 11 % (ref 3–12)
NRBC AUTOMATED: 0 PER 100 WBC
PDW BLD-RTO: 12.7 % (ref 11.8–14.4)
PLATELET # BLD: 168 K/UL (ref 138–453)
PLATELET ESTIMATE: ABNORMAL
PMV BLD AUTO: 11.4 FL (ref 8.1–13.5)
POTASSIUM SERPL-SCNC: 4.1 MMOL/L (ref 3.7–5.3)
RBC # BLD: 3.93 M/UL (ref 3.95–5.11)
RBC # BLD: ABNORMAL 10*6/UL
SEG NEUTROPHILS: 59 % (ref 36–65)
SEGMENTED NEUTROPHILS ABSOLUTE COUNT: 3.94 K/UL (ref 1.5–8.1)
SODIUM BLD-SCNC: 133 MMOL/L (ref 135–144)
TOTAL PROTEIN: 7.1 G/DL (ref 6.4–8.3)
TROPONIN INTERP: NORMAL
TROPONIN INTERP: NORMAL
TROPONIN T: NORMAL NG/ML
TROPONIN T: NORMAL NG/ML
TROPONIN, HIGH SENSITIVITY: 8 NG/L (ref 0–14)
TROPONIN, HIGH SENSITIVITY: 8 NG/L (ref 0–14)
WBC # BLD: 6.8 K/UL (ref 3.5–11.3)
WBC # BLD: ABNORMAL 10*3/UL

## 2021-04-30 PROCEDURE — 76937 US GUIDE VASCULAR ACCESS: CPT

## 2021-04-30 PROCEDURE — 84484 ASSAY OF TROPONIN QUANT: CPT

## 2021-04-30 PROCEDURE — 6370000000 HC RX 637 (ALT 250 FOR IP): Performed by: STUDENT IN AN ORGANIZED HEALTH CARE EDUCATION/TRAINING PROGRAM

## 2021-04-30 PROCEDURE — 80053 COMPREHEN METABOLIC PANEL: CPT

## 2021-04-30 PROCEDURE — 99442 PR PHYS/QHP TELEPHONE EVALUATION 11-20 MIN: CPT | Performed by: NURSE PRACTITIONER

## 2021-04-30 PROCEDURE — 71046 X-RAY EXAM CHEST 2 VIEWS: CPT

## 2021-04-30 PROCEDURE — 93005 ELECTROCARDIOGRAM TRACING: CPT | Performed by: STUDENT IN AN ORGANIZED HEALTH CARE EDUCATION/TRAINING PROGRAM

## 2021-04-30 PROCEDURE — 83690 ASSAY OF LIPASE: CPT

## 2021-04-30 PROCEDURE — 99213 OFFICE O/P EST LOW 20 MIN: CPT

## 2021-04-30 PROCEDURE — 99285 EMERGENCY DEPT VISIT HI MDM: CPT

## 2021-04-30 PROCEDURE — 96372 THER/PROPH/DIAG INJ SC/IM: CPT

## 2021-04-30 PROCEDURE — 6360000002 HC RX W HCPCS: Performed by: STUDENT IN AN ORGANIZED HEALTH CARE EDUCATION/TRAINING PROGRAM

## 2021-04-30 PROCEDURE — 85025 COMPLETE CBC W/AUTO DIFF WBC: CPT

## 2021-04-30 RX ORDER — IBUPROFEN 800 MG/1
800 TABLET ORAL EVERY 8 HOURS PRN
Qty: 30 TABLET | Refills: 0 | OUTPATIENT
Start: 2021-04-30 | End: 2021-05-26

## 2021-04-30 RX ORDER — NITROGLYCERIN 0.4 MG/1
0.4 TABLET SUBLINGUAL EVERY 5 MIN PRN
Status: DISCONTINUED | OUTPATIENT
Start: 2021-04-30 | End: 2021-04-30 | Stop reason: HOSPADM

## 2021-04-30 RX ORDER — CYCLOBENZAPRINE HCL 5 MG
5 TABLET ORAL 2 TIMES DAILY PRN
Qty: 10 TABLET | Refills: 0 | Status: SHIPPED | OUTPATIENT
Start: 2021-04-30 | End: 2021-05-10

## 2021-04-30 RX ORDER — OXYCODONE HYDROCHLORIDE AND ACETAMINOPHEN 5; 325 MG/1; MG/1
1 TABLET ORAL SEE ADMIN INSTRUCTIONS
Qty: 100 TABLET | Refills: 0 | Status: SHIPPED | OUTPATIENT
Start: 2021-05-01 | End: 2021-05-28 | Stop reason: SDUPTHER

## 2021-04-30 RX ORDER — ONDANSETRON 4 MG/1
4 TABLET, FILM COATED ORAL ONCE
Status: COMPLETED | OUTPATIENT
Start: 2021-04-30 | End: 2021-04-30

## 2021-04-30 RX ORDER — ORPHENADRINE CITRATE 30 MG/ML
60 INJECTION INTRAMUSCULAR; INTRAVENOUS ONCE
Status: COMPLETED | OUTPATIENT
Start: 2021-04-30 | End: 2021-04-30

## 2021-04-30 RX ORDER — LIDOCAINE 50 MG/G
1 PATCH TOPICAL DAILY
Qty: 10 PATCH | Refills: 0 | Status: SHIPPED | OUTPATIENT
Start: 2021-04-30 | End: 2021-05-10

## 2021-04-30 RX ADMIN — NITROGLYCERIN 0.4 MG: 0.4 TABLET SUBLINGUAL at 13:11

## 2021-04-30 RX ADMIN — ORPHENADRINE CITRATE 60 MG: 30 INJECTION INTRAMUSCULAR; INTRAVENOUS at 14:31

## 2021-04-30 RX ADMIN — ONDANSETRON HYDROCHLORIDE 4 MG: 4 TABLET, FILM COATED ORAL at 13:11

## 2021-04-30 ASSESSMENT — ENCOUNTER SYMPTOMS
EYES NEGATIVE: 1
GASTROINTESTINAL NEGATIVE: 1
BACK PAIN: 1

## 2021-04-30 NOTE — PROGRESS NOTES
Snehal 88 PROGRESS NOTE      Patient  completed []  video visit   [x]   phone call:    14     Minutes :       [x]    to  review Medication Agreement    []  Follow up after procedure   []  Discuss treatment options      Location:  Provider:  working from    [x]    home    []   [facility] - KELLEE VILLANUEVA ,   patient at   home    Chief Complaint:low back pain      She c/o low back pain which radiates down her legs, pain is unchanged, She has done PT in the past which did not give her much relief. She has no history of lumbar surgery, She sleeps well and uses cpap. She is walking a little more and do home exercises. She had 1 ED visit and was admitted overnight for chest pressure. Back Pain  This is a chronic problem. The problem occurs intermittently. The problem is unchanged. The pain is present in the lumbar spine. The quality of the pain is described as aching. Radiates to: legs. The pain is at a severity of 7/10. The pain is moderate. The pain is the same all the time. The symptoms are aggravated by standing. Associated symptoms include numbness. Risk factors include menopause and obesity. She has tried analgesics and heat for the symptoms.          Treatment goals:  Functional status: manage pain    Aberrancy:   Any alcoholic beverages   no         Any illegal drugs   no      Analgesia:   7                  Adverse  Effects :  no    ADL;s :home exercises      Data:    When was thelast UDS:     2-       Was the UDS appropriate:  [x] yes []   no      Record/Diagnostics Review:    3/8/2021  7:53 AM - Carmelo, [facility] Incoming Lab Results From [employer]    Component Value Ref Range & Units Status Collected Lab   Pain Management Drug Panel Interp, Urine Consistent   Final 02/26/2021  1:54 PM [facility]   (NOTE)   ________________________________________________________________   DRUGS EXPECTED:   OXYCODONE   ________________________________________________________________   CONSISTENT with medications provided:   OXYCODONE: based on oxycodone, noroxycodone, oxymorphone   ________________________________________________________________   INTERPRETIVE INFORMATION: Targeted drug profile Interp   Interpretation depends on accuracy and completeness of patient   medication information submitted by client. 6-Acetylmorphine, Ur Not Detected   Final 02/26/2021  1:54 PM [facility]   7-Aminoclonazepam, Urine Not Detected   Final 02/26/2021  1:54 PM [facility]   Alpha-OH-Alpraz, Urine Not Detected   Final 02/26/2021  1:54 PM [facility]   Alprazolam, Urine Not Detected   Final 02/26/2021  1:54 PM [facility]   Amphetamines, urine Not Detected   Final 02/26/2021  1:54 PM [facility]   Barbiturates, Ur Not Detected   Final 02/26/2021  1:54 PM [facility]   Benzoylecgonine, Ur Not Detected   Final 02/26/2021  1:54 PM [facility]   Buprenorphine Urine Not Detected   Final 02/26/2021  1:54 PM [facility]   Carisoprodol, Ur Not Detected   Final 02/26/2021  1:54 PM [facility]   (NOTE)   The carisoprodol immunoassay has cross-reactivity to carisoprodol   and meprobamate.     Clonazepam, Urine Not Detected   Final 02/26/2021  1:54 PM [facility]   Codeine, Urine Not Detected   Final 02/26/2021  1:54 PM [facility]   MDA, Ur Not Detected   Final 02/26/2021  1:54 PM [facility]   Diazepam, Urine Not Detected   Final 02/26/2021  1:54 PM [facility]   Ethyl Glucuronide Ur Not Detected   Final 02/26/2021  1:54 PM [facility]   Fentanyl, Ur Not Detected   Final 02/26/2021  1:54 PM [facility]   Hydrocodone, Urine Not Detected   Final 02/26/2021  1:54 PM [facility]   Hydromorphone, Urine Not Detected   Final 02/26/2021  1:54 PM [facility]   Lorazepam, Urine Not Detected   Final 02/26/2021  1:54 PM [facility]   Marijuana Metab, Ur Not Detected   Final 02/26/2021  1:54 PM [facility]   MDEA, ALMA, Ur Not Detected   Final 02/26/2021  1:54 PM [facility]   MDMA, Urine Not Detected   Final 02/26/2021  1:54 PM [facility]   Meperidine Metab, Ur Not Detected   Final 02/26/2021  1:54 PM [facility]   Methadone, Urine Not Detected   Final 02/26/2021  1:54 PM [facility]   Methamphetamine, Urine Not Detected   Final 02/26/2021  1:54 PM [facility]   Methylphenidate Not Detected   Final 02/26/2021  1:54 PM [facility]   Midazolam, Urine Not Detected   Final 02/26/2021  1:54 PM [facility]   Morphine Urine Not Detected   Final 02/26/2021  1:54 PM [facility]   Norbuprenorphine, Urine Not Detected   Final 02/26/2021  1:54 PM [facility]   Nordiazepam, Urine Not Detected   Final 02/26/2021  1:54 PM [facility]   Norfentanyl, Urine Not Detected   Final 02/26/2021  1:54 PM [facility]   NORHYDROCODONE, URINE Not Detected   Final 02/26/2021  1:54 PM [facility]   Noroxycodone, Urine Present   Final 02/26/2021  1:54 PM [facility]   NOROXYMORPHONE, URINE Not Detected   Final 02/26/2021  1:54 PM [facility]   Oxazepam, Urine Not Detected   Final 02/26/2021  1:54 PM [facility]   Oxycodone Urine Present   Final 02/26/2021  1:54 PM [facility]   Oxymorphone, Urine Present   Final 02/26/2021  1:54 PM [facility]   PCP, Urine Not Detected   Final 02/26/2021  1:54 PM [facility]   Phentermine, Ur Not Detected   Final 02/26/2021  1:54 PM [facility]   Tapentadol-O-Sulfate, Urine Not Detected   Final 02/26/2021  1:54 PM [facility]   Tapentadol, Urine Not Detected   Final 02/26/2021  1:54 PM [facility]   Temazepam, Urine Not Detected   Final 02/26/2021  1:54 PM [facility]   Tramadol, Urine Not Detected   Final 02/26/2021  1:54 PM [facility]   Zolpidem, Urine Not Detected   Final 02/26/2021  1:54 PM [facility]   Drugs Expected, Ur ON OXYCODONE   Final 02/26/2021  1:54  [address] [facility]   Creatinine, Ur 269.0  20.0 - 400.0 mg/dL Final 02/26/2021  1:54 PM [facility]   Pain Mgt Drug Panel, Hi Res, Ur See Below   Final 02/26/2021  1:54 PM [facility]   (NOTE)   Methodology: Qualitative Enzyme Immunoassay and Qualitative Liquid   Chromatography-Tandem Mass Spectrometry, Quantitative   Spectrophotometry   The absence of expected drug(s) and/or drug metabolite(s) may   indicate non-compliance, inappropriate timing of specimen   collection relative to drug administration, poor drug absorption,   diluted/adulterated urine, or limitations of testing.  The   concentration must be greater than or equal to the cutoff to be   reported as present.  If specific drug concentrations are   required, contact the laboratory within two weeks of specimen   collection to request quantification by a second analytical   technique. Interpretive questions should be directed to the   laboratory. Results based on immunoassay detection that do not match clinical   expectations should be   interpreted with caution. Confirmatory testing by mass   spectrometry for immunoassay-based results is available, if   ordered within two weeks of specimen collection. Additional   charges apply. For medical purposes only; not valid for forensic use. This test was developed and its performance characteristics   determined by [employer]. It has not been cleared or   approved by the [employer] and Drug Administration. This test was   performed in a CLIA certified laboratory and is intended for   clinical purposes. EER Hi Res Interp Ur See Note   Final 02/26/2021  1:54 PM [facility]   (NOTE)   Access [facility] Enhanced Report using the link below:   -Direct access: [email or web address]   Performed By: [employer] [facility]   [place], [address] [facility]   : Mateo Menjivar. Eduar Gomez MD    Naloxone Urine Not Detected   Final 02/26/2021  1:54 PM [facility]   Gabapentin Not Detected   Final 02/26/2021  1:54 PM [facility]   Pregabalin Not Detected   Final 02/26/2021  1:54 PM [facility]   Alpha-OH-Midazolam, Urine Not Detected   Final 02/26/2021  1:54 PM [facility]                     Pill count: appropriate    fill date :5-1-2021    Morphine equivalent dose as reported on OARRS:25  Periodic Controlled Substance Monitoring: Possible medication side effects, risk of tolerance/dependence & alternative treatments discussed., No signs of potential drug abuse or diversion identified. , Assessed functional status., Obtaining appropriate analgesic effect of treatment.  John Escobar, VERONIKA - CNP)  Review [facility] does not show any aberrant prescription behavior. Medication is helping the patient stay active. Patient denies any side effects and reports adequate analgesia. No sign of misuse/abuse. Past Medical History:   Diagnosis Date    Abdominal bloating 1/26/2021    Adenomatous polyp of ascending colon 1/26/2021    Allergic rhinitis     Anxiety 7/17/2013    Arthritis     Asthma     Atrial fibrillation ([facility])     Back pain     NERVE/DR. GRACIA    Benign hypertension with CKD (chronic kidney disease) stage III     CAD (coronary artery disease) 3/21/2013    Caffeine use     2 coffee/day    CHF (congestive heart failure) ([facility])     Chronic kidney disease     CKD (chronic kidney disease) stage 3, GFR 30-59 ml/min     COPD (chronic obstructive pulmonary disease) ([facility])     emphysema    Degeneration of lumbar or lumbosacral intervertebral disc     Depression     DM (diabetes mellitus) ([facility])     Emphysema of lung ([facility])     Gastritis     GERD (gastroesophageal reflux disease)     Hearing loss     Hematuria     Hiatal hernia     HTN (hypertension), benign 6/28/2014    Hypertriglyceridemia     Incontinence of urine 9/25/2013    Irritable bowel syndrome with both constipation and diarrhea 1/26/2021    Knee arthropathy     Lumbosacral spondylosis without myelopathy 9/29/2014    Migraine headache     DR. GRACIA    Mumps     Neuropathy     Obesity     On home oxygen therapy     2 L PER NC  HS AND PRN    HIGINIO on CPAP     Otitis media 1-26-15    Secondary diabetes mellitus with stage 3 chronic kidney disease (GFR 30-59) ([facility])     Stroke ([facility] [address]      mini stroke.     Tinnitus     Type 2 diabetes mellitus without complication ([facility])     Type II or unspecified type diabetes mellitus without mention of complication, not stated as uncontrolled     UTI (lower urinary tract infection)     Varicose vein        Past Surgical History:   Procedure Laterality Date    ABLATION OF DYSRHYTHMIC FOCUS [address] Right     CATARACT REMOVAL WITH IMPLANT Bilateral     CHOLECYSTECTOMY  2007    COLONOSCOPY      COLONOSCOPY  04/10/2017    tubular adenomo polyp , mod. sigmoid diverticulosis, min. int. hemorrhoids    COLONOSCOPY N/A 3/2/2021    COLONOSCOPY WITH BIOPSY performed by Dave Dong MD at [place] [address]  9/25/2013    DILATION AND CURETTAGE  1979    EYE SURGERY      laser    INCONTINENCE SURGERY      Percutaneous nerve stimulation Dr Shalom Chandler Nov 2013     INSERTABLE CARDIAC MONITOR  12/04/2015    [employer] REVEAL LINQ MODEL #[id number] MRI CONDTIONAL OK 3T. IMMEDIATELY POST IMPLANT    OTHER SURGICAL HISTORY  09/10/15    removal of interstim    CA COLSC FLX W/REMOVAL LESION BY HOT BX FORCEPS N/A 4/10/2017    COLONOSCOPY POLYPECTOMY HOT BIOPSY performed by Jillian Loaiza MD at [address] [place] [address]      TYMPANOPLASTY      TYMPANOPLASTY      TYMPANOSTOMY TUBE PLACEMENT  08/21/2017    UPPER GASTROINTESTINAL ENDOSCOPY  03/2016    Dr Sarwat Bravo N/A 3/2/2021    EGD BIOPSY performed by Dave Dong MD at [facility]       Allergies   Allergen Reactions    Robitussin [Guaifenesin] Anaphylaxis    Codeine Swelling    Compazine [Prochlorperazine Maleate] Hives and Swelling    Iodides Itching    Morphine Other (See Comments)     hallucinations    Moxifloxacin      Other reaction(s): Intolerance-unknown  Other reaction(s): Intolerance-unknown    Reglan [Metoclopramide] Nausea Only    Avelox [Moxifloxacin Hcl In Nacl] Rash     Dermatitis      Cipro Xr Rash     dermatitis    Sulfa Antibiotics Rash         Current Outpatient Medications:     [START ON 5/1/2021] oxyCODONE-acetaminophen (PERCOCET) 5-325 MG per tablet, Take 1 tablet by mouth See Admin Instructions for 30 days. Intended supply: 30 days.  1 tab 3-4 times a day prn, Disp: 100 tablet, Rfl: 0    insulin glargine (LANTUS;BASAGLAR) 100 UNIT/ML injection pen, Inject Bottle, Rfl: 3    aspirin 81 MG chewable tablet, Take 1 tablet by mouth daily, Disp: 90 tablet, Rfl: 3    pantoprazole (PROTONIX) 40 MG tablet, Take 1 tablet by mouth 2 times daily, Disp: 60 tablet, Rfl: 3    ASPIRIN LOW DOSE 81 MG EC tablet, TAKE 1 TAB BY MOUTH ONCE A DAY , Disp: 90 tablet, Rfl: 3    blood glucose monitor strips, Test 2-3 times a day & as needed for symptoms of irregular blood glucose.  Please fill for freestyle test strips, Disp: 100 strip, Rfl: 0    Blood Glucose Monitoring Suppl (ONE TOUCH ULTRA 2) w/Device KIT, , Disp: , Rfl:     ofloxacin (OCUFLOX) 0.3 % solution, , Disp: , Rfl:     ONETOUCH ULTRA strip, TEST TWO (2) TO THREE (3) TIMES A DAY& AS NEEDED , Disp: 100 each, Rfl: 0    insulin aspart (NOVOLOG FLEXPEN) 100 UNIT/ML injection pen, IF <139 - NO INSULIN; 140-199-2 UNITS;200-249-4 UNITS;250-299-6 UNITS;300-349-8 UNITS;350-400=10 UNITS;ABOVE 400-12 UNITS, Disp: 15 mL, Rfl: 3    BiPAP Machine MISC, repair/replace Bipap maintain 18/9CMH2O, Cflex=3,mask,tubing,filters,headgear,heated humidifier,all supplies PRN, Disp: , Rfl:     blood glucose test strips (TRUE METRIX BLOOD GLUCOSE TEST) strip, THREE TIMES A DAY, Disp: 100 each, Rfl: 3    dicyclomine (BENTYL) 10 MG capsule, Take 1 capsule by mouth 2 times daily as needed (abdominal spasm), Disp: 180 capsule, Rfl: 0    albuterol (PROVENTIL) (2.5 MG/3ML) 0.083% nebulizer solution, Take 3 mLs by nebulization every 6 hours as needed for Wheezing, Disp: 120 each, Rfl: 3    furosemide (LASIX) 20 MG tablet, TAKE 1 TAB BY MOUTH ONCE A DAY AS NEEDED ( WEIGHT GAIN 3 LBS OVERNIGHT ) , Disp: 30 tablet, Rfl: 3    zoster recombinant adjuvanted vaccine (SHINGRIX) 50 MCG/0.5ML SUSR injection, 50 MCG IM then repeat 2-6 months., Disp: 0.5 mL, Rfl: 1    ULTICARE MINI PEN NEEDLES 31G X 6 MM MISC, USE AS DIRECTED , Disp: 100 each, Rfl: 5    lidocaine (LMX) 4 % cream, Apply topically every 8 hrs as needed for pain, Disp: 45 g, Rfl: 3    Lift Chair MISC, by Does not apply route, Disp: 1 each, Rfl: 0    Misc.  Devices (ADJUST BATH/SHOWER SEAT/BACK) MISC, Use daily for shower, Disp: 1 each, Rfl: 0    Alcohol Swabs (B-D SINGLE USE SWABS REGULAR) PADS, THREE TIMES A DAY, Disp: 100 each, Rfl: 0    nitroGLYCERIN (NITROSTAT) 0.4 MG SL tablet, 1 under the tongue as needed for angina, may repeat q5mins for up three doses, Disp: , Rfl:     Blood Glucose Monitoring Suppl HARMEET, Use daily to check BS, Disp: 1 Device, Rfl: 0    ipratropium-albuterol (DUONEB) 0.5-2.5 (3) MG/3ML SOLN nebulizer solution, Inhale 3 mLs into the lungs every 4 hours, Disp: 360 mL, Rfl: 2    Melatonin 10 MG TABS, Take 10 mg by mouth nightly, Disp: 90 tablet, Rfl: 3    Compression Stockings MISC, by Does not apply route Pressure between 20 - 25 ., Disp: 1 each, Rfl: 0    docusate sodium (COLACE) 100 MG capsule, Take 1 capsule by mouth 2 times daily Please use while taking Percocet, Disp: 30 capsule, Rfl: 0    SYMBICORT 160-4.5 MCG/ACT AERO,  2 puffs As needed for sob, Disp: , Rfl:     OXYGEN, Inhale 3 L into the lungs , Disp: , Rfl:     Family History   Problem Relation Age of Onset    Diabetes Mother     Heart Disease Mother     Stomach Cancer Father     Diabetes Maternal Grandmother         also aunts and uncles (maternal)    Emphysema Sister        Social History     Socioeconomic History    Marital status: Legally      Spouse name: Not on file    Number of children: Not on file    Years of education: Not on file    Highest education level: Not on file   Occupational History    Occupation: disabled     Employer: N/A   Social Needs    Financial resource strain: Not on file    Food insecurity     Worry: Not on file     Inability: Not on file   [employer] needs     Medical: Not on file     Non-medical: Not on file   Tobacco Use    Smoking status: Former Smoker     Packs/day: 3.00     Years: 41.00     Pack years: 123.00     Types: Cigarettes     Quit date: 2000     Years since quittin.3    Smokeless tobacco: Never Used    Tobacco comment: quit in    Substance and Sexual Activity    Alcohol use: No     Alcohol/week: 0.0 standard drinks     Frequency: Never    Drug use: No    Sexual activity: Not Currently   Lifestyle    Physical activity     Days per week: Not on file     Minutes per session: Not on file    Stress: Not on file   Relationships    Social connections     Talks on phone: Not on file     Gets together: Not on file     Attends Church service: Not on file     Active member of club or organization: Not on file     Attends meetings of clubs or organizations: Not on file     Relationship status: Not on file    Intimate partner violence     Fear of current or ex partner: Not on file     Emotionally abused: Not on file     Physically abused: Not on file     Forced sexual activity: Not on file   Other Topics Concern    Not on file   Social History Narrative    Not on file         Review of Systems:  Review of Systems   Constitution: Negative. HENT: Positive for hearing loss. Hearing aids   Eyes: Negative. Cardiovascular: Negative. Respiratory:        Uses cpap   Endocrine:        Diabetic:   Hematologic/Lymphatic: Bruises/bleeds easily. Skin: Negative. Musculoskeletal: Positive for back pain. Gastrointestinal: Negative. Genitourinary: Negative. Neurological: Positive for numbness. Uses a walker   Psychiatric/Behavioral: Negative. Physical Exam:  LMP  (LMP Unknown)     Physical Exam  Skin:         Neurological:      Mental Status: She is alert. Psychiatric:         Mood and Affect: Mood normal.         Thought Content:  Thought content normal.           Assessment:      Problem List Items Addressed This Visit     Spondylarthrosis - Primary    Relevant Medications    oxyCODONE-acetaminophen (PERCOCET) 5-325 MG per tablet (Start on 2021)    Sacroiliitis, not elsewhere classified ([address] (Chronic)    Relevant Medications    oxyCODONE-acetaminophen (PERCOCET) 5-325 MG per tablet (Start on 5/1/2021)    Osteoarthritis of cervical spine (Chronic)    Relevant Medications    oxyCODONE-acetaminophen (PERCOCET) 5-325 MG per tablet (Start on 5/1/2021)    Neuropathy    Medication monitoring encounter (Chronic)    Lumbosacral spondylosis without myelopathy (Chronic)    Relevant Medications    oxyCODONE-acetaminophen (PERCOCET) 5-325 MG per tablet (Start on 5/1/2021)    Lumbar radiculopathy, chronic    Relevant Medications    oxyCODONE-acetaminophen (PERCOCET) 5-325 MG per tablet (Start on 5/1/2021)    DDD (degenerative disc disease), lumbar    Relevant Medications    oxyCODONE-acetaminophen (PERCOCET) 5-325 MG per tablet (Start on 5/1/2021)    DDD (degenerative disc disease), cervical (Chronic)    Relevant Medications    oxyCODONE-acetaminophen (PERCOCET) 5-325 MG per tablet (Start on 5/1/2021)    Chronic, continuous use of opioids    Bilateral low back pain with sciatica    Relevant Medications    oxyCODONE-acetaminophen (PERCOCET) 5-325 MG per tablet (Start on 5/1/2021)            Treatment Plan:  DISCUSSION: Treatment options discussed withpatient and all questions answered to patient's satisfaction. Possible side effects, risk of tolerance and or dependence and alternative treatments discussed    Obtaining appropriate analgesic effect of treatment   No signs of potential drug abuse or diversion identified    [x] Ill effects of being on chronic pain medications such as sleep disturbances, respiratory depression, hormonal changes, withdrawal symptoms, chronic opioid dependence and tolerance as well as risk of taking opioids with Benzodiazepines and taking opioids along with alcohol,  werediscussed with patient. I had asked the patient to minimize medication use and utilize pain medications only for uncontrolled rest pain or pain with exertional activities.  I advised patient not to self-escalate painmedications without consulting with us. At each of patient's future visits we will try to taper pain medications, while adjusting the adjunct medications, and re-evaluating for Physical Therapy to improve spinal andjoint strength. We will continue to have discussions to decrease pain medications as tolerated. Counseled patient on effects their pain medication and /or their medical condition mayhave on their  ability to drive or operate machinery. Instructed not to drive or operate machinery if drowsy     I also discussed with the patient regarding the dangers of combining narcotic pain medication with tranquilizers, alcohol or illegal drugs or taking the medication any way other than prescribed. The dangers were discussed  including respiratory depression and death. Patient was told to tell  all  physicians regarding the medications he is getting from pain clinic. Patient is warned not to take any unprescribed medications over-the-countermedications that can depress breathing . Patient is advised to talk to the pharmacist or physicians if planning to take any over-the-counter medications before  takeing them. Patient is strongly advised to avoid tranquilizers or  relaxants, illegal drugs  or any medications that can depress breathing  Patient is also advised to tell us if there is any changes in their medications from other physicians.             TREATMENT OPTIONS:       Medication Agreement Requirements Met  Continue Opioid therapy  Script written for  percocet  Follow up appointment made

## 2021-04-30 NOTE — ED PROVIDER NOTES
[facility]  ED  Emergency Department Encounter  Emergency Medicine Resident     Pt Name: Lokesh Garcia  MRN: [id number]  [place] 1950  Date of evaluation: 4/30/21  PCP:  Jorge Chiu MD    Chief Complaint     Chief Complaint   Patient presents with    Chest Pain    Nausea       History of present illness (HPI)  (Location/Symptom, Timing/Onset, Context/Setting, Quality, Duration, Modifying Factors, Severity.)      Lokesh Garcia is a 69 y.o. female who presented to the emergency department with concerns for anterior left-sided chest wall pain as well as epigastric nausea, patient states that chest pain is cramping, stabbing in intensity, states its transient made worse with movement and reproducible to palpation, denies diaphoresis, lightheadedness, dizziness with it, patient does have a history of COPD for which she requires 2 L nasal cannula oxygen, patient states that these cramping episodes last for period of couple seconds to a couple of minutes at a time. Patient was recently admitted here at the beginning a month with similar symptoms, had a low risk stress test at that time as well as a negative CT pulmonary embolism. Patient denies worsening swelling of bilateral lower extremities, denies change in bowel or bladder function.     Past medical / surgical/ social/ family history      Past Medical Hx:    has a past medical history of Abdominal bloating, Adenomatous polyp of ascending colon, Allergic rhinitis, Anxiety, Arthritis, Asthma, Atrial fibrillation ([address] Back pain, Benign hypertension with CKD (chronic kidney disease) stage III, CAD (coronary artery disease), Caffeine use, CHF (congestive heart failure) ([address] Chronic kidney disease, CKD (chronic kidney disease) stage 3, GFR 30-59 ml/min, COPD (chronic obstructive pulmonary disease) ([address] Degeneration of lumbar or lumbosacral intervertebral disc, Depression, DM (diabetes mellitus) ([address] Emphysema of lung ([address] Gastritis, GERD (gastroesophageal reflux disease), Hearing loss, Hematuria, Hiatal hernia, HTN (hypertension), benign, Hypertriglyceridemia, Incontinence of urine, Irritable bowel syndrome with both constipation and diarrhea, Knee arthropathy, Lumbosacral spondylosis without myelopathy, Migraine headache, Mumps, Neuropathy, Obesity, On home oxygen therapy, HIGINIO on CPAP, Otitis media, Secondary diabetes mellitus with stage 3 chronic kidney disease (GFR 30-59) ([facility]), Stroke ([facility] Tinnitus, Type 2 diabetes mellitus without complication ([facility] Type II or unspecified type diabetes mellitus without mention of complication, not stated as uncontrolled, UTI (lower urinary tract infection), and Varicose vein. Past Surgical Hx:  Patient has had no surgeries   has a past surgical history that includes Cholecystectomy (2007); Carpal tunnel release (Right); Tympanoplasty; Dilation & curettage (1979); Cystocopy (9/25/2013); Cataract removal with implant (Bilateral); Tonsillectomy; Incontinence surgery; ablation of dysrhythmic focus (2000); Upper gastrointestinal endoscopy (03/2016); eye surgery; Tubal ligation; other surgical history (09/10/15); Insertable Cardiac Monitor (12/04/2015); Tympanoplasty; Colonoscopy; Colonoscopy (04/10/2017); pr colsc flx w/removal lesion by hot bx forceps (N/A, 4/10/2017); Tympanostomy tube placement (08/21/2017); Upper gastrointestinal endoscopy (N/A, 3/2/2021); and Colonoscopy (N/A, 3/2/2021).     Social Hx:  Social History     Socioeconomic History    Marital status: Legally      Spouse name: Not on file    Number of children: Not on file    Years of education: Not on file    Highest education level: Not on file   Occupational History    Occupation: disabled     Employer: N/A   Social Needs    Financial resource strain: Not on file    Food insecurity     Worry: Not on file     Inability: Not on file    Transportation needs     Medical: Not on file     Non-medical: Not on file Tobacco Use    Smoking status: Former Smoker     Packs/day: 3.00     Years: 41.00     Pack years: 123.00     Types: Cigarettes     Quit date: 2000     Years since quittin.3    Smokeless tobacco: Never Used    Tobacco comment: quit in    Substance and Sexual Activity    Alcohol use: No     Alcohol/week: 0.0 standard drinks     Frequency: Never    Drug use: No    Sexual activity: Not Currently   Lifestyle    Physical activity     Days per week: Not on file     Minutes per session: Not on file    Stress: Not on file   Relationships    Social connections     Talks on phone: Not on file     Gets together: Not on file     Attends Buddhism service: Not on file     Active member of club or organization: Not on file     Attends meetings of clubs or organizations: Not on file     Relationship status: Not on file    Intimate partner violence     Fear of current or ex partner: Not on file     Emotionally abused: Not on file     Physically abused: Not on file     Forced sexual activity: Not on file   Other Topics Concern    Not on file   Social History Narrative    Not on file       Family Hx:  No relevant family history is reported  Family History   Problem Relation Age of Onset    Diabetes Mother     Heart Disease Mother     Stomach Cancer Father     Diabetes Maternal Grandmother         also aunts and uncles (maternal)    Emphysema Sister        Allergies:    No known medication allergies  Robitussin [guaifenesin], Codeine, Compazine [prochlorperazine maleate], Iodides, Morphine, Moxifloxacin, Reglan [metoclopramide], Avelox [moxifloxacin hcl in nacl], Cipro xr, and Sulfa antibiotics    Home Medications: The patient takes no medications  Prior to Admission medications    Medication Sig Start Date End Date Taking?  Authorizing Provider   ibuprofen (ADVIL;MOTRIN) 800 MG tablet Take 1 tablet by mouth every 8 hours as needed for Pain 21  Yes Juancarlos Molina MD   lidocaine (LIDODERM) 5 % Historical Provider, MD   Psyllium (METAMUCIL PO) Take by mouth 3 times daily Takes powder    Historical Provider, MD   blood glucose test strips (TRUE METRIX BLOOD GLUCOSE TEST) strip THREE TIMES A DAY 1/21/21   Makayla Nunez MD   ferrous sulfate (IRON 325) 325 (65 Fe) MG tablet TAKE 1 TAB BY MOUTH EVERY MORNING  1/15/21   Makayla Nunez MD   citalopram (CELEXA) 40 MG tablet TAKE 1 2 TABLET BY MOUTH TWICE A DAY  Patient taking differently: Take 20 mg by mouth 2 times daily  1/15/21   Makayla Nunez MD   carvedilol (COREG) 3.125 MG tablet TAKE 1 TAB BY MOUTH TWICE A DAY ( IN THE MORNING AND BEDTIME )  1/15/21   Makayla Nunez MD   losartan (COZAAR) 25 MG tablet TAKE 1 TAB BY MOUTH ONCE A DAY ( IN THE MORNING ) 1/15/21   Makayla Nunez MD   magnesium oxide (MAG-OX) 400 MG tablet TAKE 1 TAB BY MOUTH TWICE A DAY ( IN THE MORNING AND BEDTIME ) 1/15/21   Makayla Nunez MD   topiramate (TOPAMAX) 100 MG tablet Take 1 tablet by mouth nightly 11/30/20   Nikki Ospina MD   dicyclomine (BENTYL) 10 MG capsule Take 1 capsule by mouth 2 times daily as needed (abdominal spasm) 11/25/20   Makayla Nunez MD   albuterol (PROVENTIL) (2.5 MG/3ML) 0.083% nebulizer solution Take 3 mLs by nebulization every 6 hours as needed for Wheezing 11/3/20   Gaurav Darnell MD   furosemide (LASIX) 20 MG tablet TAKE 1 TAB BY MOUTH ONCE A DAY AS NEEDED ( WEIGHT GAIN 3 LBS OVERNIGHT )  10/31/20   Makayla Nunez MD   zoster recombinant adjuvanted vaccine [facility]) 50 MCG/0.5ML SUSR injection 50 MCG IM then repeat 2-6 months.  9/14/20 9/14/21  Makayla Nunez MD   Icosapent Ethyl (VASCEPA) 1 g CAPS capsule Take 1 capsule by mouth 2 times daily 9/14/20   Makayla Nunez MD   vitamin B-12 (CYANOCOBALAMIN) 100 MCG tablet Take 50 mcg by mouth daily    Historical Provider, MD   isosorbide dinitrate (ISORDIL) 30 MG tablet Take 30 mg by mouth    Historical Provider, MD   ULTICARE MINI PEN NEEDLES 31G X 6 MM MISC USE AS DIRECTED  8/14/20   Makayla Nunez MD metFORMIN (GLUCOPHAGE) 1000 MG tablet TAKE 1 TAB BY MOUTH TWICE A DAY ( IN THE MORNING AND BEDTIME )  8/10/20   Monica Wilson MD   gabapentin (NEURONTIN) 300 MG capsule Take 1 capsule by mouth 3 times daily for 30 days. 7/30/20 4/30/21  VERONIKA Malagon - CNP   nystatin (MYCOSTATIN) 332728 UNIT/GM powder Apply 3 times daily. 6/25/20   Monica Wilson MD   aspirin 81 MG chewable tablet Take 1 tablet by mouth daily 12/3/19   Monica Wilson MD   lidocaine (LMX) 4 % cream Apply topically every 8 hrs as needed for pain 11/11/19   Monica Wilson MD   Lift Chair MISC by Does not apply route 9/24/19   Monica Wilson MD   Misc.  Devices (ADJUST BATH/SHOWER SEAT/BACK) MISC Use daily for shower 9/24/19   Monica Wilson MD   pantoprazole (PROTONIX) 40 MG tablet Take 1 tablet by mouth 2 times daily 3/15/19   Tim Farley, DO   Alcohol Swabs (B-D SINGLE USE SWABS REGULAR) PADS THREE TIMES A DAY 12/12/18   Monica Wilson MD   nitroGLYCERIN (NITROSTAT) 0.4 MG SL tablet 1 under the tongue as needed for angina, may repeat q5mins for up three doses 8/28/18   Historical Provider, MD   Blood Glucose Monitoring Suppl HARMEET Use daily to check BS 3/27/18   Monica Wilson MD   ipratropium-albuterol (DUONEB) 0.5-2.5 (3) MG/3ML SOLN nebulizer solution Inhale 3 mLs into the lungs every 4 hours 2/6/18   Pacheco Orourke MD   Melatonin 10 MG TABS Take 10 mg by mouth nightly 10/19/17   Monica Wilson MD   Compression Stockings MISC by Does not apply route Pressure between 20 - 25 . 9/11/17   Monica Wilosn MD   docusate sodium (COLACE) 100 MG capsule Take 1 capsule by mouth 2 times daily Please use while taking Percocet 9/10/15   Steven Watters MD   SYMBICORT 160-4.5 MCG/ACT AERO   2 puffs As needed for sob 5/28/15   Nick Provider, MD   OXYGEN Inhale 3 L into the lungs     Historical Provider, MD       Review of systems  (2-9 systems for level 4, 10 or more for level 5)      CONSTITUTIONAL: Denies recent fever, chills  EYES: No visual changes. NECK: No midline neck pain  RESPIRATORY: Denies shortness of breath. Denies dyspnea. CARDIAC: +  chest pain. The pain does not radiate. GI: + abdominal pain +  nausea, Denies  vomiting. Denies Blood in the stool or black tarry stools. : Denies dysuria  MUSCULOSKELETAL: Denies focal weakness. NEUROLOGICAL: denies headache or focal weakness. SKIN:  Denies any rash. Physical exam  (up to 7 for level 4, 8 or more for level 5)      /76   Pulse 68   Temp 97.4 °F (36.3 °C)   Resp 22   LMP  (LMP Unknown)   SpO2 96%     GENERAL APPEARANCE: AxOx4, generally well-appearing. no acute distress. HEENT: Normocephalic and atraumatic. Mucus membranes moist. EOMI, clear conjunctiva, oropharynx clear. NECK: Supple without lymphadenopathy. No stiffness or restricted ROM. HEART: Normal rate and regular rhythm, normal S1/S1, no m/r/g. Tenderness to palpation of the anterior chest wall  LUNGS: clear to auscultation without wheezes or rales, good air movement  ABDOMEN: Soft, nontender, nondistended with good bowel sounds heard. BACK: No CVAT, no obvious deformity. EXTREMITIES: Without cyanosis, clubbing or edema. NEUROLOGICAL: Grossly nonfocal. Alert and oriented, moving all 4 extremities. CN not formally tested but appear grossly intact. SKIN: Warm and dry without any rash.     Plan     DIAGNOSTIC ORDERS:  Orders Placed This Encounter   Procedures    XR CHEST (2 VW)    CBC WITH AUTO DIFFERENTIAL    Troponin    COMPREHENSIVE METABOLIC PANEL    LIPASE    Troponin    Inpatient consult to IV Team       MEDICATION ORDERS:  Orders Placed This Encounter   Medications    ondansetron (ZOFRAN) tablet 4 mg    nitroGLYCERIN (NITROSTAT) SL tablet 0.4 mg    orphenadrine (NORFLEX) injection 60 mg    ibuprofen (ADVIL;MOTRIN) 800 MG tablet     Sig: Take 1 tablet by mouth every 8 hours as needed for Pain     Dispense:  30 tablet     Refill:  0    lidocaine (LIDODERM) 5 %     Sig: Place 1 patch onto the skin daily for 10 days 12 hours on, 12 hours off.      Dispense:  10 patch     Refill:  0    cyclobenzaprine (FLEXERIL) 5 MG tablet     Sig: Take 1 tablet by mouth 2 times daily as needed for Muscle spasms     Dispense:  10 tablet     Refill:  0       Differential diagnosis      Emergent: ACS/NSTEMI/STEMI/angina, arrhythmia, trauma, aortic dissection,  PE, PNA, pneumothroax, esophageal rupture, tamponade, Cocaine use, Coronary artery dissection  Nonemergent: pneumonia, pericarditis, GERD, MSK, Endocarditis, anxiety    Evaluated for: diaphoresis, present chest pain, tachypnea, BP both arms, heart sounds, JVD, tender chest wall, wheezing        Diagnostic Results     LABS:  Results for orders placed or performed during the hospital encounter of 04/30/21   CBC WITH AUTO DIFFERENTIAL   Result Value Ref Range    WBC 6.8 3.5 - 11.3 k/uL    RBC 3.93 (L) 3.95 - 5.11 m/uL    Hemoglobin 12.1 11.9 - 15.1 g/dL    Hematocrit 38.4 36.3 - 47.1 %    MCV 97.7 82.6 - 102.9 fL    MCH 30.8 25.2 - 33.5 pg    MCHC 31.5 28.4 - 34.8 g/dL    RDW 12.7 11.8 - 14.4 %    Platelets 014 838 - 467 k/uL    MPV 11.4 8.1 - 13.5 fL    NRBC Automated 0.0 0.0 per 100 WBC    Differential Type NOT REPORTED     Seg Neutrophils 59 36 - 65 %    Lymphocytes 25 24 - 43 %    Monocytes 11 3 - 12 %    Eosinophils % 4 1 - 4 %    Basophils 1 0 - 2 %    Immature Granulocytes 0 0 %    Segs Absolute 3.94 1.50 - 8.10 k/uL    Absolute Lymph # 1.71 1.10 - 3.70 k/uL    Absolute Mono # 0.76 0.10 - 1.20 k/uL    Absolute Eos # 0.27 0.00 - 0.44 k/uL    Basophils Absolute 0.06 0.00 - 0.20 k/uL    Absolute Immature Granulocyte 0.03 0.00 - 0.30 k/uL    WBC Morphology NOT REPORTED     RBC Morphology NOT REPORTED     Platelet Estimate NOT REPORTED    Troponin   Result Value Ref Range    Troponin, High Sensitivity 8 0 - 14 ng/L    Troponin T NOT REPORTED <0.03 ng/mL    Troponin Interp NOT REPORTED    COMPREHENSIVE METABOLIC PANEL   Result Value Ref Range    Glucose 112 (H) 70 - 99 mg/dL    BUN 22 8 - 23 mg/dL    CREATININE 1.01 (H) 0.50 - 0.90 mg/dL    Bun/Cre Ratio NOT REPORTED 9 - 20    Calcium 8.9 8.6 - 10.4 mg/dL    Sodium 133 (L) 135 - 144 mmol/L    Potassium 4.1 3.7 - 5.3 mmol/L    Chloride 99 98 - 107 mmol/L    CO2 25 20 - 31 mmol/L    Anion Gap 9 9 - 17 mmol/L    Alkaline Phosphatase 55 35 - 104 U/L    ALT 27 5 - 33 U/L    AST 23 <32 U/L    Total Bilirubin 0.16 (L) 0.3 - 1.2 mg/dL    Total Protein 7.1 6.4 - 8.3 g/dL    Albumin 3.9 3.5 - 5.2 g/dL    Albumin/Globulin Ratio 1.2 1.0 - 2.5    GFR Non- 54 (L) >60 mL/min    GFR African American >60 >60 mL/min    GFR Comment          GFR Staging NOT REPORTED    LIPASE   Result Value Ref Range    Lipase 55 13 - 60 U/L   Troponin   Result Value Ref Range    Troponin, High Sensitivity 8 0 - 14 ng/L    Troponin T NOT REPORTED <0.03 ng/mL    Troponin Interp NOT REPORTED        RADIOLOGY:  XR CHEST (2 VW)   Preliminary Result   No acute cardiopulmonary disease. EKG Interpretation  Normal sinus, normal axis, rate of 64, no ST elevation or depression, no T wave inversion, nonspecific EKG      Medical decision making  (MDM) / ED Course   Patient presents with concerns for anterior chest wall pain reproducible on palpation, recent negative stress test, pending an x-ray in order to assess for acute pleuritis, pneumonia, plan to get serial troponins in order to assess for signs of myocardial ischemia, chest pain does not show specific signs of myocardial ischemia or change from previous EKG, plan to get a CBC and reassess for decreased red blood cell count in the setting of potential demand ischemia, plan to get him electrolytes including potassium and calcium in order to assess for acute pathology, acute abnormality predisposing cardiac arrhythmia. Plan treat patient pain with Tylenol, ibuprofen while in the emergency department.     Patient first troponin negative, patient reevaluated, still having

## 2021-04-30 NOTE — ED NOTES
Bed: 46PED  Expected date: 4/30/21  Expected time: 12:09 PM  Means of arrival: [facility]  Comments:  [id number]  CP      Enrrique Marc RN  04/30/21 1216

## 2021-04-30 NOTE — ED PROVIDER NOTES
[facility] ED  eMERGENCY dEPARTMENT eNCOUnter   Attending Attestation     Pt Name: Kylah Em  MRN: [id number]  [place] 1950  Date of evaluation: 4/30/21       Kylah Em is a 69 y.o. female who presents with Chest Pain and Nausea      History: Patient with chest pain and nausea. Patient states that the chest pain is sharp and severe and it comes and goes. Patient has no chest pain at this time. Patient says that she had 2 episodes of chest pain since has been the emergency department and that they were severe over her left chest.  Patient says she developed some nausea and palpitations with the last episode. Exam: Heart rate and rhythm are regular. Lungs are clear to auscultation bilaterally. Abdomen is soft, nontender. Patient has tenderness over the left-sided chest.  This is reproducing her pain. EKG shows normal sinus rhythm with a rate of 64 bpm.  No T wave inversion. No ST elevation or depression. T waves are upright. Nonspecific EKG. Recent stress. Consider discharge if negative workup today. I performed a history and physical examination of the patient and discussed management with the resident. I reviewed the residents note and agree with the documented findings and plan of care. Any areas of disagreement are noted on the chart. I was personally present for the key portions of any procedures. I have documented in the chart those procedures where I was not present during the key portions. I have personally reviewed all images and agree with the resident's interpretation. I have reviewed the emergency nurses triage note. I agree with the chief complaint, past medical history, past surgical history, allergies, medications, social and family history as documented unless otherwise noted below.  Documentation of the HPI, Physical Exam and Medical Decision Making performed by medical students or scribes is based on my personal performance of the HPI, PE and MDM. For Phys Assistant/ Nurse Practitioner cases/documentation I have had a face to face evaluation of this patient and have completed at least one if not all key elements of the E/M (history, physical exam, and MDM). Additional findings are as noted. For APC cases I have personally evaluated and examined the patient in conjunction with the APC and agree with the treatment plan and disposition of the patient as recorded by the APC.     Devan Riley MD  Attending Emergency  Physician       Swapnil Senior MD  04/30/21 1400

## 2021-05-01 LAB
EKG ATRIAL RATE: 64 BPM
EKG P AXIS: 72 DEGREES
EKG P-R INTERVAL: 144 MS
EKG Q-T INTERVAL: 392 MS
EKG QRS DURATION: 62 MS
EKG QTC CALCULATION (BAZETT): 404 MS
EKG R AXIS: -22 DEGREES
EKG T AXIS: 88 DEGREES
EKG VENTRICULAR RATE: 64 BPM

## 2021-05-26 ENCOUNTER — APPOINTMENT (OUTPATIENT)
Dept: CT IMAGING | Age: 71
End: 2021-05-26
Payer: COMMERCIAL

## 2021-05-26 ENCOUNTER — HOSPITAL ENCOUNTER (EMERGENCY)
Age: 71
Discharge: HOME | End: 2021-05-26
Attending: EMERGENCY MEDICINE
Payer: COMMERCIAL

## 2021-05-26 VITALS
OXYGEN SATURATION: 93 % | HEART RATE: 90 BPM | TEMPERATURE: 98.4 F | SYSTOLIC BLOOD PRESSURE: 110 MMHG | RESPIRATION RATE: 18 BRPM | DIASTOLIC BLOOD PRESSURE: 90 MMHG

## 2021-05-26 DIAGNOSIS — R07.89 CHEST WALL PAIN: Primary | ICD-10-CM

## 2021-05-26 DIAGNOSIS — S20.212A CONTUSION OF LEFT CHEST WALL, INITIAL ENCOUNTER: ICD-10-CM

## 2021-05-26 LAB
ANION GAP SERPL CALCULATED.3IONS-SCNC: 9 MMOL/L (ref 9–17)
BUN BLDV-MCNC: 21 MG/DL (ref 8–23)
BUN/CREAT BLD: ABNORMAL (ref 9–20)
CALCIUM SERPL-MCNC: 8.9 MG/DL (ref 8.6–10.4)
CHLORIDE BLD-SCNC: 98 MMOL/L (ref 98–107)
CO2: 23 MMOL/L (ref 20–31)
CREAT SERPL-MCNC: 1.06 MG/DL (ref 0.5–0.9)
GFR AFRICAN AMERICAN: >60 ML/MIN
GFR NON-AFRICAN AMERICAN: 51 ML/MIN
GFR SERPL CREATININE-BSD FRML MDRD: ABNORMAL ML/MIN/{1.73_M2}
GFR SERPL CREATININE-BSD FRML MDRD: ABNORMAL ML/MIN/{1.73_M2}
GLUCOSE BLD-MCNC: 299 MG/DL (ref 70–99)
HCT VFR BLD CALC: 42.8 % (ref 36.3–47.1)
HEMOGLOBIN: 13.3 G/DL (ref 11.9–15.1)
MCH RBC QN AUTO: 30.8 PG (ref 25.2–33.5)
MCHC RBC AUTO-ENTMCNC: 31.1 G/DL (ref 28.4–34.8)
MCV RBC AUTO: 99.1 FL (ref 82.6–102.9)
NRBC AUTOMATED: 0 PER 100 WBC
PDW BLD-RTO: 12.6 % (ref 11.8–14.4)
PLATELET # BLD: 191 K/UL (ref 138–453)
PMV BLD AUTO: 11.5 FL (ref 8.1–13.5)
POTASSIUM SERPL-SCNC: 5.2 MMOL/L (ref 3.7–5.3)
RBC # BLD: 4.32 M/UL (ref 3.95–5.11)
SODIUM BLD-SCNC: 130 MMOL/L (ref 135–144)
WBC # BLD: 10.5 K/UL (ref 3.5–11.3)

## 2021-05-26 PROCEDURE — 6360000002 HC RX W HCPCS: Performed by: EMERGENCY MEDICINE

## 2021-05-26 PROCEDURE — 80048 BASIC METABOLIC PNL TOTAL CA: CPT

## 2021-05-26 PROCEDURE — 85027 COMPLETE CBC AUTOMATED: CPT

## 2021-05-26 PROCEDURE — 96374 THER/PROPH/DIAG INJ IV PUSH: CPT

## 2021-05-26 PROCEDURE — 99284 EMERGENCY DEPT VISIT MOD MDM: CPT

## 2021-05-26 PROCEDURE — 71250 CT THORAX DX C-: CPT

## 2021-05-26 RX ORDER — FENTANYL CITRATE 50 UG/ML
50 INJECTION, SOLUTION INTRAMUSCULAR; INTRAVENOUS ONCE
Status: COMPLETED | OUTPATIENT
Start: 2021-05-26 | End: 2021-05-26

## 2021-05-26 RX ORDER — NAPROXEN 375 MG/1
375 TABLET ORAL 2 TIMES DAILY WITH MEALS
Qty: 10 TABLET | Refills: 0 | Status: SHIPPED | OUTPATIENT
Start: 2021-05-26 | End: 2021-08-27

## 2021-05-26 RX ADMIN — FENTANYL CITRATE 50 MCG: 50 INJECTION, SOLUTION INTRAMUSCULAR; INTRAVENOUS at 15:58

## 2021-05-26 ASSESSMENT — ENCOUNTER SYMPTOMS
ABDOMINAL PAIN: 0
BACK PAIN: 0
SHORTNESS OF BREATH: 0
VOMITING: 0
NAUSEA: 0

## 2021-05-26 ASSESSMENT — PAIN DESCRIPTION - LOCATION: LOCATION: ABDOMEN

## 2021-05-26 ASSESSMENT — PAIN DESCRIPTION - ORIENTATION: ORIENTATION: LEFT

## 2021-05-26 ASSESSMENT — PAIN SCALES - GENERAL: PAINLEVEL_OUTOF10: 10

## 2021-05-26 ASSESSMENT — PAIN DESCRIPTION - FREQUENCY: FREQUENCY: CONTINUOUS

## 2021-05-26 NOTE — ED PROVIDER NOTES
[facility]  Emergency Department Encounter  Emergency Medicine Resident     Pt Name: Kassidy Culver  MRN: [id number]  [place] 1950  Date of evaluation: 5/26/21  PCP:  Edna Gibson MD    [address] [place] [address]       Chief Complaint   Patient presents with    Chest Pain       HISTORY OF PRESENT ILLNESS  (Location/Symptom, Timing/Onset, Context/Setting, Quality, Duration, Modifying Factors, Severity.)    Kassidy Culver is a 69 y.o. female who presents with left-sided chest wall pain. Patient states that she accidentally rolled out of bed and hit her chest on a plastic garbage can on the side of her bed and then rolled onto the floor. She did not hit her head or lose consciousness. She is not experiencing any neck or back pain. She is on Plavix but no other anticoagulation medications. She is chronically on 2 and half liters of oxygen at home due to emphysema which she has not needed increased. She is not having any shortness of breath, lightheadedness or dizziness, numbness or tingling, weakness, breaks in skin.     PAST MEDICAL / SURGICAL / SOCIAL / FAMILY HISTORY    has a past medical history of Abdominal bloating, Adenomatous polyp of ascending colon, Allergic rhinitis, Anxiety, Arthritis, Asthma, Atrial fibrillation (HCC), Back pain, Benign hypertension with CKD (chronic kidney disease) stage III, CAD (coronary artery disease), Caffeine use, CHF (congestive heart failure) ([address] Chronic kidney disease, CKD (chronic kidney disease) stage 3, GFR 30-59 ml/min, COPD (chronic obstructive pulmonary disease) ([address] Degeneration of lumbar or lumbosacral intervertebral disc, Depression, DM (diabetes mellitus) ([address] Emphysema of lung ([address] Gastritis, GERD (gastroesophageal reflux disease), Hearing loss, Hematuria, Hiatal hernia, HTN (hypertension), benign, Hypertriglyceridemia, Incontinence of urine, Irritable bowel syndrome with both constipation and diarrhea, Knee arthropathy, Lumbosacral spondylosis without myelopathy, Migraine headache, Mumps, Neuropathy, Obesity, On home oxygen therapy, HIGINIO on CPAP, Otitis media, Secondary diabetes mellitus with stage 3 chronic kidney disease (GFR 30-59) ([facility]), Stroke ([facility] Tinnitus, Type 2 diabetes mellitus without complication ([facility] [address] [facility] Type II or unspecified type diabetes mellitus without mention of complication, not stated as uncontrolled, UTI (lower urinary tract infection), and Varicose vein. has a past surgical history that includes Cholecystectomy (); Carpal tunnel release (Right); Tympanoplasty; Dilation & curettage (); Cystocopy (2013); Cataract removal with implant (Bilateral); Tonsillectomy; Incontinence surgery; ablation of dysrhythmic focus (); Upper gastrointestinal endoscopy (2016); eye surgery; Tubal ligation; other surgical history (09/10/15); Insertable Cardiac Monitor (2015); Tympanoplasty; Colonoscopy; Colonoscopy (04/10/2017); pr colsc flx w/removal lesion by hot bx forceps (N/A, 4/10/2017); Tympanostomy tube placement (2017); Upper gastrointestinal endoscopy (N/A, 3/2/2021); and Colonoscopy (N/A, 3/2/2021).     Social History     Socioeconomic History    Marital status: Legally      Spouse name: Not on file    Number of children: Not on file    Years of education: Not on file    Highest education level: Not on file   Occupational History    Occupation: disabled     Employer: N/A   Tobacco Use    Smoking status: Former Smoker     Packs/day: 3.00     Years: 41.00     Pack years: 123.00     Types: Cigarettes     Quit date: 2000     Years since quittin.4    Smokeless tobacco: Never Used    Tobacco comment: quit in    Vaping Use    Vaping Use: Never used   Substance and Sexual Activity    Alcohol use: No     Alcohol/week: 0.0 standard drinks    Drug use: No    Sexual activity: Not Currently   Other Topics Concern    Not on file   Social History Narrative    Not on DOSE 81 MG EC tablet TAKE 1 TAB BY MOUTH ONCE A DAY  4/23/21   Bernard Burton MD   blood glucose monitor strips Test 2-3 times a day & as needed for symptoms of irregular blood glucose. Please fill for freestyle test strips 4/21/21   Bernard Burton MD   insulin glargine (LANTUS;BASAGLAR) 100 UNIT/ML injection pen Inject 42 units into the skin 2 times daily.  4/19/21   Bernard Burton MD   clopidogrel (PLAVIX) 75 MG tablet TAKE 1 TAB BY MOUTH ONCE A DAY ( IN THE MORNING ) -THIS MEDICINE MAY BE TAKENWITH OR WITHOUT FOOD  4/14/21   Bernard Burton MD   Blood Glucose Monitoring Suppl (ONE TOUCH ULTRA 2) w/Device KIT  2/10/21   Historical Provider, MD   ofloxacin (OCUFLOX) 0.3 % solution  3/16/21   Historical Provider, MD   ONETOUCH ULTRA strip TEST TWO (2) TO THREE (3) TIMES A DAY& AS NEEDED  3/5/21   Bernard Burton MD   insulin aspart (NOVOLOG FLEXPEN) 100 UNIT/ML injection pen IF <139 - NO INSULIN; 140-199-2 UNITS;200-249-4 UNITS;250-299-6 UNITS;300-349-8 UNITS;350-400=10 UNITS;ABOVE 400-12 UNITS 3/3/21   Bernard Burton MD   atorvastatin (LIPITOR) 40 MG tablet Take 1 tablet by mouth daily 2/28/21   Bernard Burton MD   Multiple Vitamins-Minerals (THERAPEUTIC MULTIVITAMIN-MINERALS) tablet Take 1 tablet by mouth daily Takes Women over 50 Complete Vitamin daily ([employer] brand)    Historical Provider, MD   BiPAP Machine MISC repair/replace Bipap maintain 18/9CMH2O, Cflex=3,mask,tubing,filters,headgear,heated humidifier,all supplies PRN 1/28/21   Historical Provider, MD   Psyllium (METAMUCIL PO) Take by mouth 3 times daily Takes powder    Historical Provider, MD   blood glucose test strips (TRUE METRIX BLOOD GLUCOSE TEST) strip THREE TIMES A DAY 1/21/21   Bernard Burton MD   ferrous sulfate (IRON 325) 325 (65 Fe) MG tablet TAKE 1 TAB BY MOUTH EVERY MORNING  1/15/21   Bernard Burton MD   citalopram (CELEXA) 40 MG tablet TAKE 1 2 TABLET BY MOUTH TWICE A DAY  Patient taking differently: Take 20 mg by mouth 2 times daily  1/15/21 Edna Gibson MD   carvedilol (COREG) 3.125 MG tablet TAKE 1 TAB BY MOUTH TWICE A DAY ( IN THE MORNING AND BEDTIME )  1/15/21   Edna Gibson MD   losartan (COZAAR) 25 MG tablet TAKE 1 TAB BY MOUTH ONCE A DAY ( IN THE MORNING ) 1/15/21   Edna Gibson MD   magnesium oxide (MAG-OX) 400 MG tablet TAKE 1 TAB BY MOUTH TWICE A DAY ( IN THE MORNING AND BEDTIME ) 1/15/21   Edna Gibson MD   topiramate (TOPAMAX) 100 MG tablet Take 1 tablet by mouth nightly 11/30/20   Tristan Carson MD   dicyclomine (BENTYL) 10 MG capsule Take 1 capsule by mouth 2 times daily as needed (abdominal spasm) 11/25/20   Edna Gibson MD   albuterol (PROVENTIL) (2.5 MG/3ML) 0.083% nebulizer solution Take 3 mLs by nebulization every 6 hours as needed for Wheezing 11/3/20   Ramon Reich MD   furosemide (LASIX) 20 MG tablet TAKE 1 TAB BY MOUTH ONCE A DAY AS NEEDED ( WEIGHT GAIN 3 LBS OVERNIGHT )  10/31/20   Edna Gibson MD   zoster recombinant adjuvanted vaccine [facility]) 50 MCG/0.5ML SUSR injection 50 MCG IM then repeat 2-6 months. 9/14/20 9/14/21  Edna Gibson MD   Icosapent Ethyl (VASCEPA) 1 g CAPS capsule Take 1 capsule by mouth 2 times daily 9/14/20   Edna Gibson MD   vitamin B-12 (CYANOCOBALAMIN) 100 MCG tablet Take 50 mcg by mouth daily    Historical Provider, MD   isosorbide dinitrate (ISORDIL) 30 MG tablet Take 30 mg by mouth    Historical Provider, MD   ULTICARE MINI PEN NEEDLES 31G X 6 MM MISC USE AS DIRECTED  8/14/20   Edna Gibson MD   metFORMIN (GLUCOPHAGE) 1000 MG tablet TAKE 1 TAB BY MOUTH TWICE A DAY ( IN THE MORNING AND BEDTIME )  8/10/20   Edna Gibson MD   gabapentin (NEURONTIN) 300 MG capsule Take 1 capsule by mouth 3 times daily for 30 days. 7/30/20 4/30/21  Stephen Alie, APRN - CNP   nystatin (MYCOSTATIN) 056466 UNIT/GM powder Apply 3 times daily.  6/25/20   Edna Gibson MD   aspirin 81 MG chewable tablet Take 1 tablet by mouth daily 12/3/19   Edna Gibson MD   lidocaine (LMX) 4 % cream Apply topically every 8 hrs as needed for pain 11/11/19   Scotty Adler MD   Lift Chair MISC by Does not apply route 9/24/19   Scotty Adler MD   Misc. Devices (ADJUST BATH/SHOWER SEAT/BACK) MISC Use daily for shower 9/24/19   Scotty Adler MD   pantoprazole (PROTONIX) 40 MG tablet Take 1 tablet by mouth 2 times daily 3/15/19   Jeananne Socks, DO   Alcohol Swabs (B-D SINGLE USE SWABS REGULAR) PADS THREE TIMES A DAY 12/12/18   Scotty Adler MD   nitroGLYCERIN (NITROSTAT) 0.4 MG SL tablet 1 under the tongue as needed for angina, may repeat q5mins for up three doses 8/28/18   Historical Provider, MD   Blood Glucose Monitoring Suppl HAMREET Use daily to check BS 3/27/18   Scotty Adler MD   ipratropium-albuterol (DUONEB) 0.5-2.5 (3) MG/3ML SOLN nebulizer solution Inhale 3 mLs into the lungs every 4 hours 2/6/18   Ag Figueroa MD   Melatonin 10 MG TABS Take 10 mg by mouth nightly 10/19/17   Scotty Adler MD   Compression Stockings MISC by Does not apply route Pressure between 20 - 25 . 9/11/17   Scotty Adler MD   docusate sodium (COLACE) 100 MG capsule Take 1 capsule by mouth 2 times daily Please use while taking Percocet 9/10/15   Mg Bocanegra MD   SYMBICORT 160-4.5 MCG/ACT AERO   2 puffs As needed for sob 5/28/15   Historical Provider, MD   OXYGEN Inhale 3 L into the lungs     Historical Provider, MD       REVIEW OF SYSTEMS    (2-9 systems for level 4, 10 or more for level 5)    Review of Systems   HENT: Negative for nosebleeds. Eyes: Negative for visual disturbance. Respiratory: Negative for shortness of breath. Cardiovascular: Positive for chest pain. Gastrointestinal: Negative for abdominal pain, nausea and vomiting. Genitourinary: Negative for flank pain. Musculoskeletal: Positive for myalgias. Negative for back pain and neck pain. Skin: Negative for wound. Neurological: Negative for dizziness, syncope, weakness, light-headedness, numbness and headaches.    Hematological: Bruises/bleeds precautions       MEDICATIONS ORDERED:  Orders Placed This Encounter   Medications    fentaNYL (SUBLIMAZE) injection 50 mcg    naproxen (NAPROSYN) 375 MG tablet     Sig: Take 1 tablet by mouth 2 times daily (with meals) for 5 days     Dispense:  10 tablet     Refill:  0     DIAGNOSTIC RESULTS / EMERGENCYDEPARTMENT COURSE / MDM   LABS:  Labs Reviewed   BASIC METABOLIC PANEL - Abnormal; Notable for the following components:       Result Value    Glucose 299 (*)     CREATININE 1.06 (*)     Sodium 130 (*)     GFR Non- 51 (*)     All other components within normal limits   CBC       RADIOLOGY:  CT CHEST WO CONTRAST    Result Date: 5/26/2021  EXAMINATION: CT OF THE CHEST WITHOUT CONTRAST 5/26/2021 4:02 pm TECHNIQUE: CT of the chest was performed without the administration of intravenous contrast. Multiplanar reformatted images are provided for review. Dose modulation, iterative reconstruction, and/or weight based adjustment of the mA/kV was utilized to reduce the radiation dose to as low as reasonably achievable. COMPARISON: Chest CTA of 04/05/2021 HISTORY: ORDERING SYSTEM PROVIDED HISTORY: fall, L sided chest wall pain TECHNOLOGIST PROVIDED HISTORY: fall, L sided chest wall pain Decision Support Exception - unselect if not a suspected or confirmed emergency medical condition->Emergency Medical Condition (MA) FINDINGS: CT chest: Lines and tubes:  None. Lungs and Airways and Pleura:  Central airways are patent. Unchanged 8 mm pulmonary nodule within the right middle lobe. Unchanged linear atelectatic changes within the medial aspect of the right middle lobe. Subtle linear atelectatic changes within the lingula is unchanged. No lung consolidation. No pleural effusion. No pneumothorax. Lymph nodes: No pathologically enlarged mediastinal, hilar, lower cervical, or chest wall lymph nodes. Cardiovascular and Mediastinum: No acute aortic pathology.   Cardiac chamber sizes appear to measure within normal limits on this non ECG gated study. No pericardial effusion. The thyroid gland is unremarkable. The esophagus is unremarkable. Coronary arterial calcification. Dilatation of the main pulmonary artery and its branches likely suggestive of pulmonary arterial hypertension. Bones/Soft tissues: No fracture. No definite suspicious lytic or blastic foci. Unchanged tubular structure within the left upper anterior chest wall subcutaneous tissues. Visualized upper abdomen: Fatty liver. Stable study. No change since prior CT of 04/05/2021. No rib fracture or other acute intrathoracic pathology. Unchanged 8 mm pulmonary nodule within the right middle lobe. Unchanged linear atelectatic changes within the medial aspect of right middle lobe and lingula. Dilatation of the main pulmonary artery and its branches likely suggestive of pulmonary arterial hypertension. Unchanged tubular structure within the left upper anterior chest wall subcutaneous tissues. The finding may be residua of prior catheter. Clinical correlation is recommended. Fatty liver. EMERGENCY DEPARTMENT COURSE:  ED Course as of May 26 1730   Wed May 26, 2021   1624 CBC:    WBC 10.5   RBC 4.32   Hemoglobin Quant 13.3   Hematocrit 42.8   MCV 99.1   MCH 30.8   MCHC 31.1   RDW 12.6   Platelet Count 497   MPV 11.5   NRBC Automated 0.0 [AO]   1629 Creatinine baseline   BASIC METABOLIC PANEL(!):    Glucose 299(!)   BUN 21   Creatinine 1.06(!)   Bun/Cre Ratio NOT REPORTED   Calcium 8.9   Sodium 130(!)   Potassium 5.2   Chloride 98   CO2 23   Anion Gap 9   GFR Non- 51(!)   GFR  >60   GFR Comment        GFR Staging NOT REPORTED [AO]   1636 CT CHEST WO CONTRAST [AO]   6316 Reviewed results and plan for discharge with patient. She has ibuprofen at home, I will exchanges for naproxen. She has a chronic prescription for Percocet and I do not plan on giving her any other acute narcotic prescriptions.   She came via EMS and is chronically on home O2 and will need a medical cab home. Social work consulted. Discussed plan with nurse.    [AO]      ED Course User Index  [AO] Lucien Hernandez 1721, DO       MDM  Number of Diagnoses or Management Options  Chest wall pain  Contusion of left chest wall, initial encounter  Diagnosis management comments: 65-year-old female presents emergency department with left-sided chest wall pain after rolling off the bed. No head trauma, no loss of consciousness. No CT or L-spine tenderness on exam.  Full range of motion of all joints without pain. Had reproducible left-sided chest wall pain to palpation with no crepitus. Bilateral breath sounds. Basic labs obtained which were baseline for patient. CT of the chest without contrast obtained, no evidence of pneumothorax, no pulmonary contusion, no rib fractures, no sternal fracture. Patient has chronic prescription for opioids at home as well as Neurontin. Changed her ibuprofen to naproxen for a few days. Also discussed ice and heat to help with the pain. Patient came via EMS and is chronically on home O2, social work consulted for medical cab ride home. Patient stable for discharge. Amount and/or Complexity of Data Reviewed  Clinical lab tests: ordered and reviewed  Tests in the radiology section of CPT®: ordered and reviewed  Review and summarize past medical records: yes  Discuss the patient with other providers: yes  Independent visualization of images, tracings, or specimens: yes    Patient Progress  Patient progress: stable      CONSULTS:  IP CONSULT TO SOCIAL WORK    CRITICAL CARE:  Please see attending note    FINAL IMPRESSION     1. Chest wall pain    2. Contusion of left chest wall, initial encounter        DISPOSITION / PLAN   DISPOSITION        Evaluation and treatment course in the ED, and plan of care upon discharge was discussed in length with the patient.   Patient had no further questions prior to being discharged and was instructed

## 2021-05-26 NOTE — ED PROVIDER NOTES
[facility] ED     Emergency Department     Faculty Attestation        I performed a history and physical examination of the patient and discussed management with the resident. I reviewed the residents note and agree with the documented findings and plan of care. Any areas of disagreement are noted on the chart. I was personally present for the key portions of any procedures. I have documented in the chart those procedures where I was not present during the key portions. I have reviewed the emergency nurses triage note. I agree with the chief complaint, past medical history, past surgical history, allergies, medications, social and family history as documented unless otherwise noted below. For Physician Assistant/ Nurse Practitioner cases/documentation I have personally evaluated this patient and have completed at least one if not all key elements of the E/M (history, physical exam, and MDM). Additional findings are as noted. Vital Signs: BP: (!) 110/90  Pulse: 90  Resp: 18  Temp: 98.4 °F (36.9 °C) SpO2: 93 %  PCP:  Isidra Lewis MD    Pertinent Comments:         Critical Care  None    This patient was evaluated in the Emergency Department for symptoms described in the history of present illness. He/she was evaluated in the context of the global COVID-19 pandemic, which necessitated consideration that the patient might be at risk for infection with the SARS-CoV-2 virus that causes COVID-19. Institutional protocols and algorithms that pertain to the evaluation of patients at risk for COVID-19 are in a state of rapid change based on information released by regulatory bodies including the CDC and federal and state organizations. These policies and algorithms were followed during the patient's care in the ED.     (Please note that portions of this note were completed with a voice recognition program. Efforts were made to edit the dictations but occasionally words are mis-transcribed.  Whenever words are used in this note in any gender, they shall be construed as though they were used in the gender appropriate to the circumstances; and whenever words are used in this note in the singular or plural form, they shall be construed as though they were used in the form appropriate to the circumstances.)    MD Martin Quan  Attending Emergency Medicine Physician           Nithin Marin MD  05/26/21 [id number]

## 2021-05-27 ENCOUNTER — TELEPHONE (OUTPATIENT)
Dept: FAMILY MEDICINE CLINIC | Age: 71
End: 2021-05-27

## 2021-05-28 ENCOUNTER — HOSPITAL ENCOUNTER (OUTPATIENT)
Dept: PAIN MANAGEMENT | Age: 71
Discharge: HOME | End: 2021-05-28
Payer: COMMERCIAL

## 2021-05-28 DIAGNOSIS — M54.16 LUMBAR RADICULOPATHY, CHRONIC: ICD-10-CM

## 2021-05-28 DIAGNOSIS — M47.817 LUMBOSACRAL SPONDYLOSIS WITHOUT MYELOPATHY: Chronic | ICD-10-CM

## 2021-05-28 DIAGNOSIS — M46.1 SACROILIITIS, NOT ELSEWHERE CLASSIFIED (HCC): Chronic | ICD-10-CM

## 2021-05-28 DIAGNOSIS — M50.30 DDD (DEGENERATIVE DISC DISEASE), CERVICAL: Chronic | ICD-10-CM

## 2021-05-28 PROCEDURE — 99212 OFFICE O/P EST SF 10 MIN: CPT | Performed by: NURSE PRACTITIONER

## 2021-05-28 PROCEDURE — 99213 OFFICE O/P EST LOW 20 MIN: CPT

## 2021-05-28 RX ORDER — OXYCODONE HYDROCHLORIDE AND ACETAMINOPHEN 5; 325 MG/1; MG/1
1 TABLET ORAL SEE ADMIN INSTRUCTIONS
Qty: 100 TABLET | Refills: 0 | Status: SHIPPED | OUTPATIENT
Start: 2021-06-02 | End: 2021-06-30 | Stop reason: SDUPTHER

## 2021-05-28 ASSESSMENT — ENCOUNTER SYMPTOMS
BACK PAIN: 1
CONSTIPATION: 0
COUGH: 0
SHORTNESS OF BREATH: 0

## 2021-05-28 NOTE — PROGRESS NOTES
Patient completed a telephone visit today to review medication contract. Chief Complaint: back pain    PMH Pt c/o low back pain that radiates down left leg. She has never had a lumbar surgery. She is dependant on her wheelchair to get around - can walk very short distances. She had LESI  9/13/19 with mild relief. History of TIA  and was in rehab facility for a few weeks following. Recent ED visit for chest pain after a fall. She will see cardiologist today. Back Pain  This is a chronic problem. The current episode started more than 1 year ago. The problem occurs constantly. The problem is unchanged. The pain is present in the lumbar spine. The quality of the pain is described as aching. Radiates to: down both legs to the feet. The pain is at a severity of 6/10. The pain is moderate. The symptoms are aggravated by position and twisting. Pertinent negatives include no fever, numbness or tingling. She has tried analgesics and bed rest for the symptoms. The treatment provided mild relief. Patient denies any new neurological symptoms. No bowel or bladder incontinence, no weakness, and no falling. Pill count: appropriate due 6/2    Morphine equivalent: 25    Periodic Controlled Substance Monitoring: Possible medication side effects, risk of tolerance/dependence & alternative treatments discussed., No signs of potential drug abuse or diversion identified., Obtaining appropriate analgesic effect of treatment. Cortney Heaton, APRN - CNP)      Past Medical History:   Diagnosis Date    Abdominal bloating 1/26/2021    Adenomatous polyp of ascending colon 1/26/2021    Allergic rhinitis     Anxiety 7/17/2013    Arthritis     Asthma     Atrial fibrillation (HCC)     Back pain     NERVE/DR. GRACIA    Benign hypertension with CKD (chronic kidney disease) stage III     CAD (coronary artery disease) 3/21/2013    Caffeine use     2 coffee/day    CHF (congestive heart failure) (HCC)     Chronic kidney disease     CKD (chronic kidney disease) stage 3, GFR 30-59 ml/min     COPD (chronic obstructive pulmonary disease) (HCC)     emphysema    Degeneration of lumbar or lumbosacral intervertebral disc     Depression     DM [facility]diabetes mellitus) ([facility]     Emphysema of lung ([facility])     Gastritis     GERD (gastroesophageal reflux disease)     Hearing loss     Hematuria     Hiatal hernia     HTN (hypertension), benign 6/28/2014    Hypertriglyceridemia     Incontinence of urine 9/25/2013    Irritable bowel syndrome with both constipation and diarrhea 1/26/2021    Knee arthropathy     Lumbosacral spondylosis without myelopathy 9/29/2014    Migraine headache     DR. GRACIA    Mumps     Neuropathy     Obesity     On home oxygen therapy     2 L PER NC  HS AND PRN    HIGINIO on CPAP     Otitis media 1-26-15    Secondary diabetes mellitus with stage 3 chronic kidney disease (GFR 30-59) ([facility])     Stroke ([facility] [address]      mini stroke.  Tinnitus     Type 2 diabetes mellitus without complication ([facility])     Type II or unspecified type diabetes mellitus without mention of complication, not stated as uncontrolled     UTI (lower urinary tract infection)     Varicose vein        Past Surgical History:   Procedure Laterality Date    ABLATION OF DYSRHYTHMIC FOCUS  2000    CARPAL TUNNEL RELEASE Right     CATARACT REMOVAL WITH IMPLANT Bilateral     CHOLECYSTECTOMY  2007    COLONOSCOPY      COLONOSCOPY  04/10/2017    tubular adenomo polyp , mod. sigmoid diverticulosis, min. int. hemorrhoids    COLONOSCOPY N/A 3/2/2021    COLONOSCOPY WITH BIOPSY performed by Donny De La Rosa MD at [place] [address]  9/25/2013    DILATION AND CURETTAGE  1979    EYE SURGERY      laser    INCONTINENCE SURGERY      Percutaneous nerve stimulation  [facility] 2013     INSERTABLE CARDIAC MONITOR  12/04/2015    [employer] REVEAL LINQ MODEL #[id number] MRI CONDTIONAL OK 3T.  IMMEDIATELY POST IMPLANT    OTHER SURGICAL HISTORY  09/10/15 removal of interstim    FL COLSC FLX W/REMOVAL LESION BY HOT BX FORCEPS N/A 4/10/2017    COLONOSCOPY POLYPECTOMY HOT BIOPSY performed by Omari Chen MD at [facility]      TYMPANOPLASTY      TYMPANOPLASTY      TYMPANOSTOMY TUBE PLACEMENT  08/21/2017    UPPER GASTROINTESTINAL ENDOSCOPY  03/2016    Dr Carmen Torres N/A 3/2/2021    EGD BIOPSY performed by Anneliese Juares MD at [facility]       Allergies   Allergen Reactions    Robitussin [Guaifenesin] Anaphylaxis    Codeine Swelling    Compazine [Prochlorperazine Maleate] Hives and Swelling    Iodides Itching    Morphine Other (See Comments)     hallucinations    Moxifloxacin      Other reaction(s): Intolerance-unknown  Other reaction(s): Intolerance-unknown    Reglan [Metoclopramide] Nausea Only    Avelox [Moxifloxacin Hcl In Nacl] Rash     Dermatitis      Cipro Xr Rash     dermatitis    Sulfa Antibiotics Rash         Current Outpatient Medications:     naproxen (NAPROSYN) 375 MG tablet, Take 1 tablet by mouth 2 times daily (with meals) for 5 days, Disp: 10 tablet, Rfl: 0    oxyCODONE-acetaminophen (PERCOCET) 5-325 MG per tablet, Take 1 tablet by mouth See Admin Instructions for 30 days. Intended supply: 30 days. 1 tab 3-4 times a day prn, Disp: 100 tablet, Rfl: 0    ASPIRIN LOW DOSE 81 MG EC tablet, TAKE 1 TAB BY MOUTH ONCE A DAY , Disp: 90 tablet, Rfl: 3    blood glucose monitor strips, Test 2-3 times a day & as needed for symptoms of irregular blood glucose. Please fill for freestyle test strips, Disp: 100 strip, Rfl: 0    insulin glargine (LANTUS;BASAGLAR) 100 UNIT/ML injection pen, Inject 42 units into the skin 2 times daily. , Disp: 5 pen, Rfl: 3    clopidogrel (PLAVIX) 75 MG tablet, TAKE 1 TAB BY MOUTH ONCE A DAY ( IN THE MORNING ) -THIS MEDICINE MAY BE TAKENWITH OR WITHOUT FOOD , Disp: 90 tablet, Rfl: 1    Blood Glucose Monitoring Suppl (ONE TOUCH ULTRA 2) w/Device KIT, , Take 3 mLs by nebulization every 6 hours as needed for Wheezing, Disp: 120 each, Rfl: 3    furosemide (LASIX) 20 MG tablet, TAKE 1 TAB BY MOUTH ONCE A DAY AS NEEDED ( WEIGHT GAIN 3 LBS OVERNIGHT ) , Disp: 30 tablet, Rfl: 3    zoster recombinant adjuvanted vaccine (SHINGRIX) 50 MCG/0.5ML SUSR injection, 50 MCG IM then repeat 2-6 months., Disp: 0.5 mL, Rfl: 1    Icosapent Ethyl (VASCEPA) 1 g CAPS capsule, Take 1 capsule by mouth 2 times daily, Disp: 60 capsule, Rfl: 3    vitamin B-12 (CYANOCOBALAMIN) 100 MCG tablet, Take 50 mcg by mouth daily, Disp: , Rfl:     isosorbide dinitrate (ISORDIL) 30 MG tablet, Take 30 mg by mouth, Disp: , Rfl:     ULTICARE MINI PEN NEEDLES 31G X 6 MM MISC, USE AS DIRECTED , Disp: 100 each, Rfl: 5    metFORMIN (GLUCOPHAGE) 1000 MG tablet, TAKE 1 TAB BY MOUTH TWICE A DAY ( IN THE MORNING AND BEDTIME ) , Disp: 180 tablet, Rfl: 3    gabapentin (NEURONTIN) 300 MG capsule, Take 1 capsule by mouth 3 times daily for 30 days. , Disp: 90 capsule, Rfl: 2    nystatin (MYCOSTATIN) 910101 UNIT/GM powder, Apply 3 times daily. , Disp: 3 Bottle, Rfl: 3    aspirin 81 MG chewable tablet, Take 1 tablet by mouth daily, Disp: 90 tablet, Rfl: 3    lidocaine (LMX) 4 % cream, Apply topically every 8 hrs as needed for pain, Disp: 45 g, Rfl: 3    Lift Chair MISC, by Does not apply route, Disp: 1 each, Rfl: 0    Misc.  Devices (ADJUST BATH/SHOWER SEAT/BACK) MISC, Use daily for shower, Disp: 1 each, Rfl: 0    pantoprazole (PROTONIX) 40 MG tablet, Take 1 tablet by mouth 2 times daily, Disp: 60 tablet, Rfl: 3    Alcohol Swabs (B-D SINGLE USE SWABS REGULAR) PADS, THREE TIMES A DAY, Disp: 100 each, Rfl: 0    nitroGLYCERIN (NITROSTAT) 0.4 MG SL tablet, 1 under the tongue as needed for angina, may repeat q5mins for up three doses, Disp: , Rfl:     Blood Glucose Monitoring Suppl HARMEET, Use daily to check BS, Disp: 1 Device, Rfl: 0    ipratropium-albuterol (DUONEB) 0.5-2.5 (3) MG/3ML SOLN nebulizer solution, Inhale 3 mLs into the lungs every 4 hours, Disp: 360 mL, Rfl: 2    Melatonin 10 MG TABS, Take 10 mg by mouth nightly, Disp: 90 tablet, Rfl: 3    Compression Stockings MISC, by Does not apply route Pressure between 20 - 25 ., Disp: 1 each, Rfl: 0    docusate sodium (COLACE) 100 MG capsule, Take 1 capsule by mouth 2 times daily Please use while taking Percocet, Disp: 30 capsule, Rfl: 0    SYMBICORT 160-4.5 MCG/ACT AERO,  2 puffs As needed for sob, Disp: , Rfl:     OXYGEN, Inhale 3 L into the lungs , Disp: , Rfl:     Family History   Problem Relation Age of Onset    Diabetes Mother     Heart Disease Mother     Stomach Cancer Father     Diabetes Maternal Grandmother         also aunts and uncles (maternal)    Emphysema Sister        Social History     Socioeconomic History    Marital status: Legally      Spouse name: Not on file    Number of children: Not on file    Years of education: Not on file    Highest education level: Not on file   Occupational History    Occupation: disabled     Employer: N/A   Tobacco Use    Smoking status: Former Smoker     Packs/day: 3.00     Years: 41.00     Pack years: 123.00     Types: Cigarettes     Quit date: 2000     Years since quittin.4    Smokeless tobacco: Never Used    Tobacco comment: quit in    Vaping Use    Vaping Use: Never used   Substance and Sexual Activity    Alcohol use: No     Alcohol/week: 0.0 standard drinks    Drug use: No    Sexual activity: Not Currently   Other Topics Concern    Not on file   Social History Narrative    Not on file     Social Determinants of Health     Financial Resource Strain:     Difficulty of Paying Living Expenses:    Food Insecurity:     Worried About Running Out of Food in the Last Year:     Ran Out of Food in the Last Year:    Transportation Needs:     Lack of Transportation (Medical):      Lack of Transportation (Non-Medical):    Physical Activity:     Days of Exercise per Week:     Minutes of Exercise per Session:    Stress:     Feeling of Stress :    Social Connections:     Frequency of Communication with Friends and Family:     Frequency of Social Gatherings with Friends and Family:     Attends [employer] Services:     Active Member of Clubs or Organizations:     Attends Club or Organization Meetings:     Marital Status:    Intimate Partner Violence:     Fear of Current or Ex-Partner:     Emotionally Abused:     Physically Abused:     Sexually Abused:        Review of Systems:  Review of Systems   Constitutional: Negative for chills and fever. Cardiovascular: Positive for dyspnea on exertion. Negative for palpitations. Respiratory: Negative for cough and shortness of breath. Musculoskeletal: Positive for back pain. Gastrointestinal: Negative for constipation. Neurological: Negative for disturbances in coordination, loss of balance, numbness and tingling. Physical Exam:  LMP  (LMP Unknown)     Physical Exam  Neurological:      Mental Status: She is alert.    Psychiatric:         Mood and Affect: Mood normal.         Record/Diagnostics Review:    Last greg 2/2021 and was appropriate     ABERRANT BEHAVIORS SINCE LAST VISIT  Lost rx/pills:------------------------------------------ no  Taking  medication as prescribed: ----------- yes  Urine Drug Screen ---------------------------------  yes  Recent ER visits: -------------------------------------yes - for chest pain after a fall  Pill count is appropriate: ---------------------------yes   Refills for prescriptions appropriate:---------- yes    Assessment:  Problem List Items Addressed This Visit     DDD (degenerative disc disease), cervical (Chronic)    Relevant Medications    oxyCODONE-acetaminophen (PERCOCET) 5-325 MG per tablet (Start on 6/2/2021)    Lumbosacral spondylosis without myelopathy (Chronic)    Relevant Medications    oxyCODONE-acetaminophen (PERCOCET) 5-325 MG per tablet (Start on 6/2/2021) Sacroiliitis, not elsewhere classified ([facility] [address] (Chronic)    Relevant Medications    oxyCODONE-acetaminophen (PERCOCET) 5-325 MG per tablet (Start on 6/2/2021)    Lumbar radiculopathy, chronic    Relevant Medications    oxyCODONE-acetaminophen (PERCOCET) 5-325 MG per tablet (Start on 6/2/2021)             Treatment Plan:  Patient relates current medications are helping the pain. Patient reports taking pain medications as prescribed, denies obtaining medications from different sources and denies use of illegal drugs. Patient denies side effects from medications like nausea, vomiting, constipation or drowsiness. Patient reports current activities of daily living are possible due to medications and would like to continue them. As always, we encourage daily stretching and strengthening exercises, and recommend minimizing use of pain medications unless patient cannot get through daily activities due to pain. Contract requirements met. Continue opioid therapy. Script written for percocet  Pt will see cardiologist today for chest pain - recent ED visit  Follow up appointment made for 4 weeks    Diony Benton, was evaluated through a synchronous (real-time) audio-video encounter. The patient (or guardian if applicable) is aware that this is a billable service. Verbal consent to proceed has been obtained within the past 12 months. The visit was conducted pursuant to the emergency declaration under the [address] [place] [address] authority and the [employer] and [employer] General Act. Patient identification was verified, and a caregiver was present when appropriate. The patient was located in a state where the provider was credentialed to provide care. Total time spent for this encounter: 15 minutes    --VERONIKA Smith CNP on 5/28/2021 at 10:40 AM    An electronic signature was used to authenticate this note.

## 2021-06-10 DIAGNOSIS — Z79.4 TYPE 2 DIABETES MELLITUS WITH DIABETIC POLYNEUROPATHY, WITH LONG-TERM CURRENT USE OF INSULIN (HCC): Chronic | ICD-10-CM

## 2021-06-10 DIAGNOSIS — E11.42 TYPE 2 DIABETES MELLITUS WITH DIABETIC POLYNEUROPATHY, WITH LONG-TERM CURRENT USE OF INSULIN (HCC): Chronic | ICD-10-CM

## 2021-06-10 RX ORDER — BLOOD SUGAR DIAGNOSTIC
STRIP MISCELLANEOUS
Qty: 100 EACH | Refills: 0 | Status: SHIPPED | OUTPATIENT
Start: 2021-06-10 | End: 2021-08-20

## 2021-06-23 ASSESSMENT — ENCOUNTER SYMPTOMS
GASTROINTESTINAL NEGATIVE: 1
EYE PAIN: 0
ABDOMINAL PAIN: 0
SHORTNESS OF BREATH: 1
BACK PAIN: 1
PHOTOPHOBIA: 0
EYE REDNESS: 0
SORE THROAT: 0
CONSTIPATION: 0
COUGH: 0

## 2021-06-23 NOTE — PROGRESS NOTES
Natasha Dotson is a 69 y.o. female evaluated on 6/30/2021. Modality of virtual service provided -via  telephone   Consent:  Patient and/or health care decision maker is aware that that patient may receive a bill for this telephone service, depending on one's insurance coverage, and has provided verbal consent to proceed: Yes    Patient identification was verified at the start of the visit: Yes    Chief complaint: Natasha Dotson is 69 y.o.,  female, with  with chief complaint of pain involving low back. .    Patient is complaining of pain involving the low back as well as in the cervical region. Patient reports her pain is essentially unchanged since the last visit. Patient reports he is trying to exercise and work on weight loss. Overall she is content with the relief of pain she is getting at this time. Back Pain  This is a chronic problem. The current episode started more than 1 year ago. The problem occurs constantly. The problem is unchanged (Slightly worse than the past). The pain is present in the lumbar spine and sacro-iliac. The quality of the pain is described as aching (sharp). The pain radiates to the left thigh, right foot and right thigh. The pain is at a severity of 8/10 (5-9). The pain is severe. The pain is worse during the day. The symptoms are aggravated by bending, standing and twisting (ADLs, Walking, Lifting). Associated symptoms include chest pain and weakness. Pertinent negatives include no abdominal pain, dysuria, numbness or tingling. (Rt leg) Risk factors include obesity, lack of exercise and sedentary lifestyle. Alleviating factors:heat and rest   Lifestyle changes experienced with pain: Prevents or limits ADLs, Increases w/activity.  , Increases w/prolonged sitting/standing/walking  Mood changes,none  Patient currently unemployed. Physical therapy did not help the pain.     Are you under psychological counseling at present: No  Goals for treatment include:  Decrease in pain  Enjoy daily and recreational activities, return to previous status. Patient relates current medications are helping the pain. Patient reports taking pain medications as prescribed, denies obtaining medications from different sources and denies use of illegal drugs. Patient denies side effects from medications like nausea, vomiting, constipation or drowsiness. Patient reports current activities of daily living ar possible due to medications and would like to continue them. ACTIVITY/SOCIAL/EMOTIONAL:  Sleep Pattern: 8 hours per night. generally restful sleep  Home Exercises:  Some stretches   Activity:unchanged  Emotional Issues: normal.   Currently seeing a Psychiatrist or Psychologist:  No     ADVERSE MEDICATION EFFECTS:   Nausea and vomiting: no   Constipation: no-Undercontrol-: yes  Dizziness/drowsy/sleepy--no  Urinary Retention: no    ABERRANT BEHAVIORS SINCE LAST VISIT  Lost rx/pills:------------------------------------------ no  Taking  medication as prescribed: ----------- yes  Urine Drug Screen ---------------------------------  yes             Date------------------------------------------------ 1/26/2021              Results as expected ---------------------yes    Recent ER visits: -------------------------------------No  Pill count is appropriate: ---------------------------yes   Refills for prescriptions appropriate:---------- yes      Past Medical History:   Diagnosis Date    Abdominal bloating 1/26/2021    Adenomatous polyp of ascending colon 1/26/2021    Allergic rhinitis     Anxiety 7/17/2013    Arthritis     Asthma     Atrial fibrillation (HCC)     Back pain     NERVE/DR. GRACIA    Benign hypertension with CKD (chronic kidney disease) stage III (HCC)     CAD (coronary artery disease) 3/21/2013    Caffeine use     2 coffee/day    CHF (congestive heart failure) (HCC)     Chronic kidney disease     CKD (chronic kidney disease) stage 3, GFR 30-59 ml/min ([facility] [address]     COPD (chronic obstructive pulmonary disease) ([facility])     emphysema    Degeneration of lumbar or lumbosacral intervertebral disc     Depression     DM (diabetes mellitus) ([facility])     Emphysema of lung (HCC)     Gastritis     GERD (gastroesophageal reflux disease)     Hearing loss     Hematuria     Hiatal hernia     HTN (hypertension), benign 6/28/2014    Hypertriglyceridemia     Incontinence of urine 9/25/2013    Irritable bowel syndrome with both constipation and diarrhea 1/26/2021    Knee arthropathy     Lumbosacral spondylosis without myelopathy 9/29/2014    Migraine headache     DR. BASIL [place]     Neuropathy     Obesity     On home oxygen therapy     2 L PER NC  HS AND PRN    HIGINIO on CPAP     Otitis media 1-26-15    Secondary diabetes mellitus with stage 3 chronic kidney disease (GFR 30-59) ([facility])     Stroke ([facility] [address]      mini stroke.  Tinnitus     Type 2 diabetes mellitus without complication ([facility])     Type II or unspecified type diabetes mellitus without mention of complication, not stated as uncontrolled     UTI (lower urinary tract infection)     Varicose vein        Past Surgical History:   Procedure Laterality Date    ABLATION OF DYSRHYTHMIC FOCUS  2000    CARPAL TUNNEL RELEASE Right     CATARACT REMOVAL WITH IMPLANT Bilateral     CHOLECYSTECTOMY  2007    COLONOSCOPY      COLONOSCOPY  04/10/2017    tubular adenomo polyp , mod. sigmoid diverticulosis, min. int. hemorrhoids    COLONOSCOPY N/A 3/2/2021    COLONOSCOPY WITH BIOPSY performed by Petra Gaytan MD at [place] [address]  9/25/2013    DILATION AND CURETTAGE  1979    EYE SURGERY      laser    INCONTINENCE SURGERY      Percutaneous nerve stimulation Dr Vicki Chang Nov 2013     INSERTABLE CARDIAC MONITOR  12/04/2015    [employer] REVEAL LINQ MODEL #[id number] MRI CONDTIONAL OK 3T.  IMMEDIATELY POST IMPLANT    OTHER SURGICAL HISTORY  09/10/15    removal of interstim    HI COLSC FLX W/REMOVAL LESION BY HOT BX FORCEPS N/A 4/10/2017    COLONOSCOPY POLYPECTOMY HOT BIOPSY performed by Reginaldo Butt MD at [address] TYMPANOPLASTY      TYMPANOPLASTY      TYMPANOSTOMY TUBE PLACEMENT  2017    UPPER GASTROINTESTINAL ENDOSCOPY  2016    Dr Aguilar Guajardo N/A 3/2/2021    EGD BIOPSY performed by Melany Graf [facility] at [facility]       Family History   Problem Relation Age of Onset    Diabetes Mother     Heart Disease Mother     Stomach Cancer Father     Diabetes Maternal Grandmother         also aunts and uncles (maternal)    Emphysema Sister        Social History     Socioeconomic History    Marital status: Legally      Spouse name: None    Number of children: None    Years of education: None    Highest education level: None   Occupational History    Occupation: disabled     Employer: N/A   Tobacco Use    Smoking status: Former Smoker     Packs/day: 3.00     Years: 41.00     Pack years: 123.00     Types: Cigarettes     Quit date: 2000     Years since quittin.5    Smokeless tobacco: Never Used    Tobacco comment: quit in    Vaping Use    Vaping Use: Never used   Substance and Sexual Activity    Alcohol use: No     Alcohol/week: 0.0 standard drinks    Drug use: No    Sexual activity: Not Currently   Other Topics Concern    None   Social History Narrative    None     Social Determinants of Health     Financial Resource Strain:     Difficulty of Paying Living Expenses:    Food Insecurity:     Worried About Running Out of Food in the Last Year:     Ran Out of Food in the Last Year:    Transportation Needs:     Lack of Transportation (Medical):      Lack of Transportation (Non-Medical):    Physical Activity:     Days of Exercise per Week:     Minutes of Exercise per Session:    Stress:     Feeling of Stress :    Social Connections:     Frequency of Communication with Friends and Family:     Frequency of Social Gatherings with Friends and Family:     Attends Congregation Services:     Active Member of Clubs or Organizations:     Attends Club or Organization Meetings:     Marital Status:    Intimate Partner Violence:     Fear of Current or Ex-Partner:     Emotionally Abused:     Physically Abused:     Sexually Abused: Allergies   Allergen Reactions    Robitussin [Guaifenesin] Anaphylaxis    Codeine Swelling    Compazine [Prochlorperazine Maleate] Hives and Swelling    Iodides Itching    Morphine Other (See Comments)     hallucinations    Moxifloxacin      Other reaction(s): Intolerance-unknown  Other reaction(s): Intolerance-unknown    Reglan [Metoclopramide] Nausea Only    Avelox [Moxifloxacin Hcl In Nacl] Rash     Dermatitis      Cipro Xr Rash     dermatitis    Sulfa Antibiotics Rash       Current Outpatient Medications on File Prior to Encounter   Medication Sig Dispense Refill    blood glucose test strips (ONETOUCH ULTRA) strip TEST TWO (2) TO THREE (3) TIMES A DAY & AS NEEDED FOR SYMPTOMS OF IRREGULAR BLOOD GLUCOSE. PLEASE FILL FOR FREESTYLE TEST STRIPS 100 each 0    naproxen (NAPROSYN) 375 MG tablet Take 1 tablet by mouth 2 times daily (with meals) for 5 days 10 tablet 0    [DISCONTINUED] ibuprofen (ADVIL;MOTRIN) 800 MG tablet Take 1 tablet by mouth every 8 hours as needed for Pain 30 tablet 0    ASPIRIN LOW DOSE 81 MG EC tablet TAKE 1 TAB BY MOUTH ONCE A DAY  90 tablet 3    insulin glargine (LANTUS;BASAGLAR) 100 UNIT/ML injection pen Inject 42 units into the skin 2 times daily.  5 pen 3    clopidogrel (PLAVIX) 75 MG tablet TAKE 1 TAB BY MOUTH ONCE A DAY ( IN THE MORNING ) -THIS MEDICINE MAY BE TAKENWITH OR WITHOUT FOOD  90 tablet 1    Blood Glucose Monitoring Suppl (ONE TOUCH ULTRA 2) w/Device KIT       ofloxacin (OCUFLOX) 0.3 % solution       ONETOUCH ULTRA strip TEST TWO (2) TO THREE (3) TIMES A DAY& AS NEEDED  100 each 0    insulin aspart (NOVOLOG FLEXPEN) 100 UNIT/ML injection pen  vitamin B-12 (CYANOCOBALAMIN) 100 MCG tablet Take 50 mcg by mouth daily      isosorbide dinitrate (ISORDIL) 30 MG tablet Take 30 mg by mouth      ULTICARE MINI PEN NEEDLES 31G X 6 MM MISC USE AS DIRECTED  100 each 5    metFORMIN (GLUCOPHAGE) 1000 MG tablet TAKE 1 TAB BY MOUTH TWICE A DAY ( IN THE MORNING AND BEDTIME )  180 tablet 3    gabapentin (NEURONTIN) 300 MG capsule Take 1 capsule by mouth 3 times daily for 30 days. 90 capsule 2    nystatin (MYCOSTATIN) 192262 UNIT/GM powder Apply 3 times daily. 3 Bottle 3    aspirin 81 MG chewable tablet Take 1 tablet by mouth daily 90 tablet 3    lidocaine (LMX) 4 % cream Apply topically every 8 hrs as needed for pain 45 g 3    Lift Chair MISC by Does not apply route 1 each 0    Misc. Devices (ADJUST BATH/SHOWER SEAT/BACK) MISC Use daily for shower 1 each 0    pantoprazole (PROTONIX) 40 MG tablet Take 1 tablet by mouth 2 times daily 60 tablet 3    Alcohol Swabs (B-D SINGLE USE SWABS REGULAR) PADS THREE TIMES A  each 0    nitroGLYCERIN (NITROSTAT) 0.4 MG SL tablet 1 under the tongue as needed for angina, may repeat q5mins for up three doses      Blood Glucose Monitoring Suppl HARMEET Use daily to check BS 1 Device 0    ipratropium-albuterol (DUONEB) 0.5-2.5 (3) MG/3ML SOLN nebulizer solution Inhale 3 mLs into the lungs every 4 hours 360 mL 2    Melatonin 10 MG TABS Take 10 mg by mouth nightly 90 tablet 3    Compression Stockings MISC by Does not apply route Pressure between 20 - 25 . 1 each 0    docusate sodium (COLACE) 100 MG capsule Take 1 capsule by mouth 2 times daily Please use while taking Percocet 30 capsule 0    SYMBICORT 160-4.5 MCG/ACT AERO   2 puffs As needed for sob      OXYGEN Inhale 3 L into the lungs        No current facility-administered medications on file prior to encounter. Review of Systems   Constitutional: Negative for activity change, appetite change, diaphoresis and unexpected weight change.    HENT: Negative for congestion, hearing loss, sore throat and tinnitus. Eyes: Negative for photophobia, pain, redness and visual disturbance. Respiratory: Positive for shortness of breath. Negative for apnea and cough. Emphysema   Cardiovascular: Positive for chest pain and palpitations. Negative for leg swelling. Irregular heart beat   Gastrointestinal: Negative. Negative for abdominal pain and constipation. Endocrine: Negative for heat intolerance and polyuria. Genitourinary: Negative for dysuria and hematuria. Musculoskeletal: Positive for back pain. Negative for arthralgias. Skin: Negative. Negative for rash. Neurological: Positive for weakness. Negative for tingling and numbness. Hematological: Bruises/bleeds easily. Psychiatric/Behavioral: Negative for confusion, self-injury, sleep disturbance and suicidal ideas. The patient is nervous/anxious. Patient denies any change in her review of systems (health) since her last visit  Physical Exam  Skin:         Neurological:      Mental Status: She is alert and oriented to person, place, and time. Psychiatric:         Mood and Affect: Mood normal.            DATA:  LAB. :  3/8/2021  7:53 AM - Justino Rhodes Incoming Lab Results From [facility] [facility]    Component Value Ref Range & Units Status Collected Lab   Pain Management Drug Panel Interp, Urine Consistent   Final 02/26/2021  1:54 PM [facility]   (NOTE)   ________________________________________________________________   DRUGS EXPECTED:   OXYCODONE   ________________________________________________________________   CONSISTENT with medications provided:   OXYCODONE: based on oxycodone, noroxycodone, oxymorphone   ________________________________________________________________   INTERPRETIVE INFORMATION: Targeted drug profile Interp   Interpretation depends on accuracy and completeness of patient   medication information submitted by client.         X-Ray reports:  EXAMINATION:    MRI OF THE LUMBAR SPINE WITHOUT Impression    Right paracentral disc extrusion L4-5 narrowing the right lateral recess. Please correlate with possible right L5 radiculopathy. This is new from    previous MRI. Otherwise mild degenerate change. No evidence of discitis osteomyelitis or epidural abscess. CT OF THE CHEST WITHOUT CONTRAST 5/26/2021 4:02 pm       TECHNIQUE:   CT of the chest was performed without the administration of intravenous   contrast. Multiplanar reformatted images are provided for review. Dose   modulation, iterative reconstruction, and/or weight based adjustment of the   mA/kV was utilized to reduce the radiation dose to as low as reasonably   achievable.       COMPARISON:   Chest CTA of 04/05/2021       HISTORY:   ORDERING SYSTEM PROVIDED HISTORY: fall, L sided chest wall pain   TECHNOLOGIST PROVIDED HISTORY:   fall, L sided chest wall pain   Decision Support Exception - unselect if not a suspected or confirmed   emergency medical condition->Emergency Medical Condition (MA)       FINDINGS:   CT chest:       Lines and tubes:  None.       Lungs and Airways and Pleura:  Central airways are patent.  Unchanged 8 mm   pulmonary nodule within the right middle lobe.  Unchanged linear atelectatic   changes within the medial aspect of the right middle lobe.  Subtle linear   atelectatic changes within the lingula is unchanged.  No lung consolidation. No pleural effusion. No pneumothorax.       Lymph nodes: No pathologically enlarged mediastinal, hilar, lower cervical,   or chest wall lymph nodes.       Cardiovascular and Mediastinum: No acute aortic pathology.  Cardiac chamber   sizes appear to measure within normal limits on this non ECG gated study. No   pericardial effusion. The thyroid gland is unremarkable.  The esophagus is   unremarkable.  Coronary arterial calcification.  Dilatation of the main   pulmonary artery and its branches likely suggestive of pulmonary arterial   hypertension.       Bones/Soft tissues: No fracture.  No definite suspicious lytic or blastic   foci.  Unchanged tubular structure within the left upper anterior chest wall   subcutaneous tissues.       Visualized upper abdomen: Fatty liver.           Impression   Stable study.  No change since prior CT of 04/05/2021.       No rib fracture or other acute intrathoracic pathology.       Unchanged 8 mm pulmonary nodule within the right middle lobe.       Unchanged linear atelectatic changes within the medial aspect of right middle   lobe and lingula.       Dilatation of the main pulmonary artery and its branches likely suggestive of   pulmonary arterial hypertension.       Unchanged tubular structure within the left upper anterior chest wall   subcutaneous tissues. The finding may be residua of prior catheter. Clinical   correlation is recommended.       Fatty liver.           Clinical  impression:  1. Lumbosacral spondylosis without myelopathy    2. DDD (degenerative disc disease), cervical    3. Lumbar radiculopathy, chronic    4. Sacroiliitis, not elsewhere classified ([address]    5. Spondylarthrosis    6. Other osteoarthritis of spine, cervical region    7. Bilateral occipital neuralgia    8. Nonintractable migraine, unspecified migraine type    9. Chronic pain of left knee    10. Polyneuropathy, unspecified    11. Morbid obesity ([address]    12. Medication monitoring encounter        Plan of care: We will continue current pain medications  Current medications are being tolerated without any Adverse side effects. Orders Placed This Encounter   Medications    oxyCODONE-acetaminophen (PERCOCET) 5-325 MG per tablet     Sig: Take 1 tablet by mouth See Admin Instructions for 30 days. Intended supply: 30 days. 1 tab 3-4 times a day prn     Dispense:  100 tablet     Refill:  0     Reduce doses taken as pain becomes manageable     Urine drug screens have been appropriate. No aberrant activity noted. Analgesia is achieved.    Activities of daily living are and have not been to implement it, as the patient's pain is not under control with current medications. Decision Making Process : Patient's health history and referral records thoroughly reviewed before focused physical examination and discussion with patient. I have spent 25 mins. Over 50% of today's visit is spent on examining the patient and counseling and coordinating the care. Level of complexity of date to be reviewed is Moderate. The chart date reviewed include the following: Imaging Reports. Summary of Care. Time spent reviewing with patient the below reports:   Medication safety, Treatment options. Level of diagnosis and management options of this case is multiple: involving the following management options: Interventions as needed, medication management as appropriate, future visits, activity modification, heat/ice as needed, Urine drug screen as required. [x]The patient's questions were answered to the best of my abilities. This note was created using voice recognition software. There may be inaccuracies of transcription  that are inadvertently overlooked prior to the signature. There is any questions about the transcription please contact me. Return in  4 weeks  with physician / CNP  for further plan of treatment. Due to the COVID-19 pandemic and the appropriate interventions by Luis Felipe Shelton, our non-urgent pain management patients will not be seen in the office at this time for their protection and the protection of our staff.  To offer continuity of care, their prescriptions will be escribed this month after a careful chart review and review of their OARRS report  Pursuant to the emergency declaration under the [employer] and [facility] waiver authority and the [employer] and [employer] Act, this Virtual Visit was conducted, with patient's consent, to reduce the patient's risk of exposure to COVID-19 and provide continuity of care for an established patient. Services were provided through a video synchronous discussion virtually to substitute for in-person appointment. \"  Documentation:  I communicated with the patient and/or health care decision maker about plan of care  Details of this discussion including any medical advice provided: Total Time: 20 to 25 minutes    I affirm this is a Patient Initiated Episode with an Established Patient who has not had a related appointment within my department in the past 7 days or scheduled within the next 24 hours.     Electronically signed by Ilia Eller MD on 6/30/2021 at 9:44 AM

## 2021-06-28 ENCOUNTER — TELEPHONE (OUTPATIENT)
Dept: FAMILY MEDICINE CLINIC | Age: 71
End: 2021-06-28

## 2021-06-28 NOTE — TELEPHONE ENCOUNTER
----- Message from [address] sent at 6/28/2021  9:15 AM EDT -----  Subject: Appointment Request    Reason for Call: Routine Medicare AWV    QUESTIONS  Type of Appointment? Established Patient  Reason for appointment request? Available appointments did not meet   patient need  Additional Information for Provider? patient needs AWV for check up and   needed to reschedule the 6/29 dm ,htn, hld, A1C, 30min,always chronic   conditions. patient need in person with Buhmi El, please call to schedule   ---------------------------------------------------------------------------  --------------  CALL BACK INFO  What is the best way for the office to contact you? Do not leave any   message, patient will call back for answer  Preferred Call Back Phone Number? [phone]  ---------------------------------------------------------------------------  --------------  SCRIPT ANSWERS  Relationship to Patient? Self  Have your symptoms changed? No  Appointment reason? Well Care/Follow Ups  Select a Well Care/Follow Ups appointment reason? Adult Physical Exam   [Medicare Annual Wellness, AWV, PAP, Pelvic]  (If the patient has Medicare as their primary insurance coverage ask this   question) Are you requesting a Medicare Annual Wellness Visit? Yes   (Is the patient requesting a pap smear with their physical exam?)? No  (Is the patient requesting their annual physical and does not need PAP or   AWV per above?)? No  Have you been diagnosed with, awaiting test results for, or told that you   are suspected of having COVID-19 (Coronavirus)? (If patient has tested   negative or was tested as a requirement for work, school, or travel and   not based on symptoms, answer no)? No  Do you currently have flu-like symptoms including fever or chills, cough,   shortness of breath, difficulty breathing, or new loss of taste or smell? No  Have you had close contact with someone with COVID-19 in the last 14 days?    No  (Service Expert  click yes below to proceed with [employer] As Usual   Scheduling)?  Yes

## 2021-06-30 ENCOUNTER — HOSPITAL ENCOUNTER (OUTPATIENT)
Dept: PAIN MANAGEMENT | Age: 71
Discharge: HOME | End: 2021-06-30
Payer: COMMERCIAL

## 2021-06-30 DIAGNOSIS — G43.009 NONINTRACTABLE MIGRAINE, UNSPECIFIED MIGRAINE TYPE: ICD-10-CM

## 2021-06-30 DIAGNOSIS — G62.9 POLYNEUROPATHY, UNSPECIFIED: ICD-10-CM

## 2021-06-30 DIAGNOSIS — M46.1 SACROILIITIS, NOT ELSEWHERE CLASSIFIED (HCC): ICD-10-CM

## 2021-06-30 DIAGNOSIS — M50.30 DDD (DEGENERATIVE DISC DISEASE), CERVICAL: ICD-10-CM

## 2021-06-30 DIAGNOSIS — M54.81 BILATERAL OCCIPITAL NEURALGIA: ICD-10-CM

## 2021-06-30 DIAGNOSIS — G89.29 CHRONIC PAIN OF LEFT KNEE: ICD-10-CM

## 2021-06-30 DIAGNOSIS — M47.817 LUMBOSACRAL SPONDYLOSIS WITHOUT MYELOPATHY: Primary | ICD-10-CM

## 2021-06-30 DIAGNOSIS — M54.16 LUMBAR RADICULOPATHY, CHRONIC: ICD-10-CM

## 2021-06-30 DIAGNOSIS — M47.9 SPONDYLARTHROSIS: ICD-10-CM

## 2021-06-30 DIAGNOSIS — M25.562 CHRONIC PAIN OF LEFT KNEE: ICD-10-CM

## 2021-06-30 DIAGNOSIS — M47.892 OTHER OSTEOARTHRITIS OF SPINE, CERVICAL REGION: ICD-10-CM

## 2021-06-30 DIAGNOSIS — E66.01 MORBID OBESITY (HCC): ICD-10-CM

## 2021-06-30 DIAGNOSIS — Z51.81 MEDICATION MONITORING ENCOUNTER: ICD-10-CM

## 2021-06-30 PROCEDURE — 99213 OFFICE O/P EST LOW 20 MIN: CPT

## 2021-06-30 PROCEDURE — 99443 PR PHYS/QHP TELEPHONE EVALUATION 21-30 MIN: CPT | Performed by: PAIN MEDICINE

## 2021-06-30 RX ORDER — OXYCODONE HYDROCHLORIDE AND ACETAMINOPHEN 5; 325 MG/1; MG/1
1 TABLET ORAL SEE ADMIN INSTRUCTIONS
Qty: 100 TABLET | Refills: 0 | Status: SHIPPED | OUTPATIENT
Start: 2021-06-30 | End: 2021-07-30 | Stop reason: SDUPTHER

## 2021-07-01 ASSESSMENT — ENCOUNTER SYMPTOMS: APNEA: 0

## 2021-07-14 RX ORDER — INSULIN GLARGINE 100 [IU]/ML
INJECTION, SOLUTION SUBCUTANEOUS
Qty: 5 PEN | Refills: 3 | Status: SHIPPED | OUTPATIENT
Start: 2021-07-14 | End: 2021-09-27 | Stop reason: ALTCHOICE

## 2021-07-30 ENCOUNTER — HOSPITAL ENCOUNTER (OUTPATIENT)
Dept: PAIN MANAGEMENT | Age: 71
Discharge: HOME | End: 2021-07-30
Payer: COMMERCIAL

## 2021-07-30 DIAGNOSIS — F11.90 CHRONIC, CONTINUOUS USE OF OPIOIDS: ICD-10-CM

## 2021-07-30 DIAGNOSIS — M50.30 DDD (DEGENERATIVE DISC DISEASE), CERVICAL: Chronic | ICD-10-CM

## 2021-07-30 DIAGNOSIS — M47.9 SPONDYLARTHROSIS: Primary | ICD-10-CM

## 2021-07-30 DIAGNOSIS — G89.29 CHRONIC BILATERAL LOW BACK PAIN WITH SCIATICA, SCIATICA LATERALITY UNSPECIFIED: ICD-10-CM

## 2021-07-30 DIAGNOSIS — Z51.81 MEDICATION MONITORING ENCOUNTER: Chronic | ICD-10-CM

## 2021-07-30 DIAGNOSIS — M54.40 CHRONIC BILATERAL LOW BACK PAIN WITH SCIATICA, SCIATICA LATERALITY UNSPECIFIED: ICD-10-CM

## 2021-07-30 DIAGNOSIS — M54.16 LUMBAR RADICULOPATHY, CHRONIC: ICD-10-CM

## 2021-07-30 DIAGNOSIS — M47.817 LUMBOSACRAL SPONDYLOSIS WITHOUT MYELOPATHY: Chronic | ICD-10-CM

## 2021-07-30 DIAGNOSIS — G62.9 POLYNEUROPATHY, UNSPECIFIED: ICD-10-CM

## 2021-07-30 DIAGNOSIS — M51.36 DDD (DEGENERATIVE DISC DISEASE), LUMBAR: ICD-10-CM

## 2021-07-30 DIAGNOSIS — M46.1 SACROILIITIS, NOT ELSEWHERE CLASSIFIED (HCC): Chronic | ICD-10-CM

## 2021-07-30 DIAGNOSIS — E66.01 MORBID OBESITY WITH BMI OF 40.0-44.9, ADULT (HCC): ICD-10-CM

## 2021-07-30 DIAGNOSIS — G62.9 NEUROPATHY: ICD-10-CM

## 2021-07-30 DIAGNOSIS — M47.892 OTHER OSTEOARTHRITIS OF SPINE, CERVICAL REGION: Chronic | ICD-10-CM

## 2021-07-30 PROCEDURE — 99213 OFFICE O/P EST LOW 20 MIN: CPT

## 2021-07-30 PROCEDURE — 99442 PR PHYS/QHP TELEPHONE EVALUATION 11-20 MIN: CPT | Performed by: NURSE PRACTITIONER

## 2021-07-30 RX ORDER — OXYCODONE HYDROCHLORIDE AND ACETAMINOPHEN 5; 325 MG/1; MG/1
1 TABLET ORAL SEE ADMIN INSTRUCTIONS
Qty: 100 TABLET | Refills: 0 | Status: SHIPPED | OUTPATIENT
Start: 2021-07-30 | End: 2021-08-27 | Stop reason: SDUPTHER

## 2021-07-30 RX ORDER — TOBRAMYCIN AND DEXAMETHASONE 3; 1 MG/ML; MG/ML
1 SUSPENSION/ DROPS OPHTHALMIC
COMMUNITY
End: 2021-07-30

## 2021-07-30 ASSESSMENT — ENCOUNTER SYMPTOMS
BLURRED VISION: 1
GASTROINTESTINAL NEGATIVE: 1
BACK PAIN: 1

## 2021-07-30 NOTE — PROGRESS NOTES
Snehal 88 PROGRESS NOTE      Patient  completed []  video visit   [x]   phone call:    12     Minutes :       [x]    to  review Medication Agreement    []  Follow up after procedure   []  Discuss treatment options      Location:  Provider:  working from    [x]    home    []   [facility] - KELLEE VILLANUEVA ,   patient at  home       Chief Complaint: low back pain    She c/o low back pain  which has increased. The pain does not radiate. She states her back swells sometimes. No history of lumbar surgery, She states she is exercising. She is sleeping well. She is diabetic, blood sugars, she states the highest was 149. She uses cpap for sleep apnea. Back Pain  This is a chronic problem. The problem occurs constantly. The problem has been gradually worsening since onset. The pain is present in the lumbar spine. The quality of the pain is described as aching. The pain does not radiate. The pain is at a severity of 8/10. The pain is severe. The pain is worse during the night. The symptoms are aggravated by sitting. Associated symptoms include headaches. (Sometimes stinging in her legs) She has tried analgesics and bed rest for the symptoms.        Treatment goals:  Functional status: reduce pain    Aberrancy:   Any alcoholic beverages            Any illegal drugs         Analgesia:     8                Adverse  Effects :      ADL;s :home exercises      Data:    When was thelast UDS:    2-        Was the UDS appropriate:  [x] yes []   no      Record/Diagnostics Review:      As above, I did review the imaging /8/2021  7:53 AM - [place], [facility] Incoming Lab Results From [facility]    Component Value Ref Range & Units Status Collected Lab   Pain Management Drug Panel Interp, Urine Consistent   Final 02/26/2021  1:54 PM [facility]   (NOTE)   ________________________________________________________________   DRUGS EXPECTED:   OXYCODONE   ________________________________________________________________ CONSISTENT with medications provided:   OXYCODONE: based on oxycodone, noroxycodone, oxymorphone   ________________________________________________________________   INTERPRETIVE INFORMATION: Targeted drug profile Interp   Interpretation depends on accuracy and completeness of patient   medication information submitted by client. 6-Acetylmorphine, Ur Not Detected   Final           EXAMINATION:   MRI OF THE LUMBAR SPINE WITHOUT AND WITH CONTRAST  10/23/2019 5:48 pm       TECHNIQUE:   Multiplanar multisequence MRI of the lumbar spine was performed without and   with the administration of intravenous contrast.       COMPARISON:   MRI lumbar spine 01/08/2018       HISTORY:   ORDERING SYSTEM PROVIDED HISTORY:   TECHNOLOGIST PROVIDED HISTORY:   back pain, weakness, epidural injection 5 weeks ago       FINDINGS:   BONES/ALIGNMENT: There is normal alignment of the spine. The vertebral body   heights are maintained. The bone marrow signal appears unremarkable. Multilevel Schmorl's nodes identified involving the lower thoracic and upper   lumbar spine.       SPINAL CORD:  The conus terminates normally.       SOFT TISSUES: No abnormal enhancement is seen of the lumbar spine.  No   paraspinal mass identified.       L1-L2: Minimal degenerative loss of disc space height and signal.  There is   no significant disc protrusion, spinal canal stenosis or neural foraminal   narrowing.       L2-L3: Moderate disc space is identified with mild circumferential disc   bulge.  Minimal central canal stenosis.  Mild facet arthropathy.  No central   foramina.  No focal disc protrusion.       L3-L4: Mild disc space disease identified with circumferential disc bulge. There is mild right and moderate left neural foraminal narrowing.  Moderate   degenerate facet arthropathy.  Mild central canal stenosis.       L4-L5: Mild disc space disease identified with circumferential disc bulge.    There is a right paracentral disc extrusion which extends caudally 1.2 cm and   effaces the right lateral recess affecting the traversing right L5 nerve   root.  Otherwise mild neural foraminal narrowing.  No central canal stenosis. Moderate facet arthropathy.       L5-S1: Minimal degenerative loss of disc space height and signal.  No focal   disc protrusion.  Minimal neural foraminal narrowing.  Mild-to-moderate facet   arthropathy.           Impression   Right paracentral disc extrusion L4-5 narrowing the right lateral recess. Please correlate with possible right L5 radiculopathy.  This is new from   previous MRI.       Otherwise mild degenerate change.       No evidence of discitis osteomyelitis or epidural abscess.                     Pill count: appropriate    fill date :7-    Morphine equivalent dose as reported on OARRS:25  Periodic Controlled Substance Monitoring: Possible medication side effects, risk of tolerance/dependence & alternative treatments discussed., No signs of potential drug abuse or diversion identified. , Assessed functional status., Obtaining appropriate analgesic effect of treatment. Jacob Raza, APRN - CNP)  Review ofOARRS does not show any aberrant prescription behavior. Medication is helping the patient stay active. Patient denies any side effects and reports adequate analgesia. No sign of misuse/abuse. Past Medical History:   Diagnosis Date    Abdominal bloating 1/26/2021    Adenomatous polyp of ascending colon 1/26/2021    Allergic rhinitis     Anxiety 7/17/2013    Arthritis     Asthma     Atrial fibrillation (HCC)     Back pain     NERVE/DR. GRACIA    Benign hypertension with CKD (chronic kidney disease) stage III ([facility])     CAD (coronary artery disease) 3/21/2013    Caffeine use     2 coffee/day    CHF (congestive heart failure) (HCC)     Chronic kidney disease     CKD (chronic kidney disease) stage 3, GFR 30-59 ml/min ([facility])     COPD (chronic obstructive pulmonary disease) ([facility])     emphysema    Degeneration of lumbar or lumbosacral intervertebral disc     Depression     DM (diabetes mellitus) (HCC)     Emphysema of lung (HCC)     Gastritis     GERD (gastroesophageal reflux disease)     Hearing loss     Hematuria     Hiatal hernia     HTN (hypertension), benign 6/28/2014    Hypertriglyceridemia     Incontinence of urine 9/25/2013    Irritable bowel syndrome with both constipation and diarrhea 1/26/2021    Knee arthropathy     Lumbosacral spondylosis without myelopathy 9/29/2014    Migraine headache     DR. BASIL [place]     Neuropathy     Obesity     On home oxygen therapy     2 L PER NC  HS AND PRN    HIGINIO on CPAP     Otitis media 1-26-15    Secondary diabetes mellitus with stage 3 chronic kidney disease (GFR 30-59) ([facility])     Stroke ([facility] [address]      mini stroke.  Tinnitus     Type 2 diabetes mellitus without complication ([facility])     Type II or unspecified type diabetes mellitus without mention of complication, not stated as uncontrolled     UTI (lower urinary tract infection)     Varicose vein        Past Surgical History:   Procedure Laterality Date    ABLATION OF DYSRHYTHMIC FOCUS  2000    CARPAL TUNNEL RELEASE Right     CATARACT REMOVAL WITH IMPLANT Bilateral     CHOLECYSTECTOMY  2007    COLONOSCOPY      COLONOSCOPY  04/10/2017    tubular adenomo polyp , mod. sigmoid diverticulosis, min. int. hemorrhoids    COLONOSCOPY N/A 3/2/2021    COLONOSCOPY WITH BIOPSY performed by Yahir Schaffer MD at [place] [address]  9/25/2013    DILATION AND CURETTAGE  1979    EYE SURGERY      laser    INCONTINENCE SURGERY      Percutaneous nerve stimulation Dr Coleen Ding Nov 2013     INSERTABLE CARDIAC MONITOR  12/04/2015    [employer] REVEAL LINQ MODEL #[id number] MRI CONDTIONAL OK 3T.  IMMEDIATELY POST IMPLANT    OTHER SURGICAL HISTORY  09/10/15    removal of interstim    [place] COLSC FLX W/REMOVAL LESION BY HOT BX FORCEPS N/A 4/10/2017    COLONOSCOPY POLYPECTOMY HOT BIOPSY performed by Ksenia MD Lopez at [address] [place] [address]      TYMPANOPLASTY      TYMPANOPLASTY      TYMPANOSTOMY TUBE PLACEMENT  08/21/2017    UPPER GASTROINTESTINAL ENDOSCOPY  03/2016    Dr Carmen Lagunas N/A 3/2/2021    EGD BIOPSY performed by Amanda Zheng MD at [facility]       Allergies   Allergen Reactions    Robitussin [Guaifenesin] Anaphylaxis    Codeine Swelling    Compazine [Prochlorperazine Maleate] Hives and Swelling    Iodides Itching    Morphine Other (See Comments)     hallucinations    Moxifloxacin      Other reaction(s): Intolerance-unknown  Other reaction(s): Intolerance-unknown    Reglan [Metoclopramide] Nausea Only    Avelox [Moxifloxacin Hcl In Nacl] Rash     Dermatitis      Cipro Xr Rash     dermatitis    Sulfa Antibiotics Rash         Current Outpatient Medications:     insulin glargine (BASAGLAR KWIKPEN) 100 UNIT/ML injection pen, INJECT 42 UNITS INTO THE SKIN 2 TIMES DAILY. , Disp: 5 pen, Rfl: 3    oxyCODONE-acetaminophen (PERCOCET) 5-325 MG per tablet, Take 1 tablet by mouth See Admin Instructions for 30 days. Intended supply: 30 days.  1 tab 3-4 times a day prn, Disp: 100 tablet, Rfl: 0    clopidogrel (PLAVIX) 75 MG tablet, TAKE 1 TAB BY MOUTH ONCE A DAY ( IN THE MORNING ) -THIS MEDICINE MAY BE TAKENWITH OR WITHOUT FOOD , Disp: 90 tablet, Rfl: 1    atorvastatin (LIPITOR) 40 MG tablet, Take 1 tablet by mouth daily, Disp: 90 tablet, Rfl: 3    Psyllium (METAMUCIL PO), Take by mouth 3 times daily Takes powder, Disp: , Rfl:     citalopram (CELEXA) 40 MG tablet, TAKE 1 2 TABLET BY MOUTH TWICE A DAY (Patient taking differently: Take 20 mg by mouth 2 times daily ), Disp: 90 tablet, Rfl: 3    carvedilol (COREG) 3.125 MG tablet, TAKE 1 TAB BY MOUTH TWICE A DAY ( IN THE MORNING AND BEDTIME ) , Disp: 180 tablet, Rfl: 3    losartan (COZAAR) 25 MG tablet, TAKE 1 TAB BY MOUTH ONCE A DAY ( IN THE MORNING ), Disp: 90 tablet, Rfl: 5    magnesium oxide (MAG-OX) 400 MG tablet, TAKE 1 TAB BY MOUTH TWICE A DAY ( IN THE MORNING AND BEDTIME ), Disp: 180 tablet, Rfl: 3    topiramate (TOPAMAX) 100 MG tablet, Take 1 tablet by mouth nightly, Disp: 90 tablet, Rfl: 2    Icosapent Ethyl (VASCEPA) 1 g CAPS capsule, Take 1 capsule by mouth 2 times daily, Disp: 60 capsule, Rfl: 3    vitamin B-12 (CYANOCOBALAMIN) 100 MCG tablet, Take 50 mcg by mouth daily, Disp: , Rfl:     isosorbide dinitrate (ISORDIL) 30 MG tablet, Take 30 mg by mouth, Disp: , Rfl:     metFORMIN (GLUCOPHAGE) 1000 MG tablet, TAKE 1 TAB BY MOUTH TWICE A DAY ( IN THE MORNING AND BEDTIME ) , Disp: 180 tablet, Rfl: 3    gabapentin (NEURONTIN) 300 MG capsule, Take 1 capsule by mouth 3 times daily for 30 days. , Disp: 90 capsule, Rfl: 2    nystatin (MYCOSTATIN) 326913 UNIT/GM powder, Apply 3 times daily. , Disp: 3 Bottle, Rfl: 3    aspirin 81 MG chewable tablet, Take 1 tablet by mouth daily, Disp: 90 tablet, Rfl: 3    pantoprazole (PROTONIX) 40 MG tablet, Take 1 tablet by mouth 2 times daily, Disp: 60 tablet, Rfl: 3    blood glucose test strips (ONETOUCH ULTRA) strip, TEST TWO (2) TO THREE (3) TIMES A DAY & AS NEEDED FOR SYMPTOMS OF IRREGULAR BLOOD GLUCOSE.  PLEASE FILL FOR FREESTYLE TEST STRIPS, Disp: 100 each, Rfl: 0    naproxen (NAPROSYN) 375 MG tablet, Take 1 tablet by mouth 2 times daily (with meals) for 5 days, Disp: 10 tablet, Rfl: 0    ASPIRIN LOW DOSE 81 MG EC tablet, TAKE 1 TAB BY MOUTH ONCE A DAY , Disp: 90 tablet, Rfl: 3    Blood Glucose Monitoring Suppl (ONE TOUCH ULTRA 2) w/Device KIT, , Disp: , Rfl:     ofloxacin (OCUFLOX) 0.3 % solution, , Disp: , Rfl:     ONETOUCH ULTRA strip, TEST TWO (2) TO THREE (3) TIMES A DAY& AS NEEDED , Disp: 100 each, Rfl: 0    insulin aspart (NOVOLOG FLEXPEN) 100 UNIT/ML injection pen, IF <139 - NO INSULIN; 140-199-2 UNITS;200-249-4 UNITS;250-299-6 UNITS;300-349-8 UNITS;350-400=10 UNITS;ABOVE 400-12 UNITS, Disp: 15 mL, Rfl: 3    Multiple Vitamins-Minerals (THERAPEUTIC MULTIVITAMIN-MINERALS) tablet, Take 1 tablet by mouth daily Takes Women over 50 Complete Vitamin daily ([employer] brand), Disp: , Rfl:     BiPAP Machine MISC, repair/replace Bipap maintain 18/9CMH2O, Cflex=3,mask,tubing,filters,headgear,heated humidifier,all supplies PRN, Disp: , Rfl:     blood glucose test strips (TRUE METRIX BLOOD GLUCOSE TEST) strip, THREE TIMES A DAY, Disp: 100 each, Rfl: 3    ferrous sulfate (IRON 325) 325 (65 Fe) MG tablet, TAKE 1 TAB BY MOUTH EVERY MORNING , Disp: 90 tablet, Rfl: 5    dicyclomine (BENTYL) 10 MG capsule, Take 1 capsule by mouth 2 times daily as needed (abdominal spasm), Disp: 180 capsule, Rfl: 0    albuterol (PROVENTIL) (2.5 MG/3ML) 0.083% nebulizer solution, Take 3 mLs by nebulization every 6 hours as needed for Wheezing, Disp: 120 each, Rfl: 3    furosemide (LASIX) 20 MG tablet, TAKE 1 TAB BY MOUTH ONCE A DAY AS NEEDED ( WEIGHT GAIN 3 LBS OVERNIGHT ) , Disp: 30 tablet, Rfl: 3    zoster recombinant adjuvanted vaccine (SHINGRIX) 50 MCG/0.5ML SUSR injection, 50 MCG IM then repeat 2-6 months., Disp: 0.5 mL, Rfl: 1    ULTICARE MINI PEN NEEDLES 31G X 6 MM MISC, USE AS DIRECTED , Disp: 100 each, Rfl: 5    lidocaine (LMX) 4 % cream, Apply topically every 8 hrs as needed for pain, Disp: 45 g, Rfl: 3    Lift Chair MISC, by Does not apply route, Disp: 1 each, Rfl: 0    Misc.  Devices (ADJUST BATH/SHOWER SEAT/BACK) MISC, Use daily for shower, Disp: 1 each, Rfl: 0    Alcohol Swabs (B-D SINGLE USE SWABS REGULAR) PADS, THREE TIMES A DAY, Disp: 100 each, Rfl: 0    nitroGLYCERIN (NITROSTAT) 0.4 MG SL tablet, 1 under the tongue as needed for angina, may repeat q5mins for up three doses, Disp: , Rfl:     Blood Glucose Monitoring Suppl HARMEET, Use daily to check BS, Disp: 1 Device, Rfl: 0    ipratropium-albuterol (DUONEB) 0.5-2.5 (3) MG/3ML SOLN nebulizer solution, Inhale 3 mLs into the lungs every 4 hours, Disp: 360 mL, Rfl: 2    Melatonin 10 MG TABS, Take 10 mg by mouth nightly, Disp: 90 tablet, Rfl: 3    Compression Stockings MISC, by Does not apply route Pressure between 20 - 25 ., Disp: 1 each, Rfl: 0    docusate sodium (COLACE) 100 MG capsule, Take 1 capsule by mouth 2 times daily Please use while taking Percocet, Disp: 30 capsule, Rfl: 0    SYMBICORT 160-4.5 MCG/ACT AERO,  2 puffs As needed for sob, Disp: , Rfl:     OXYGEN, Inhale 3 L into the lungs , Disp: , Rfl:     Family History   Problem Relation Age of Onset    Diabetes Mother     Heart Disease Mother     Stomach Cancer Father     Diabetes Maternal Grandmother         also aunts and uncles (maternal)    Emphysema Sister        Social History     Socioeconomic History    Marital status: Legally      Spouse name: Not on file    Number of children: Not on file    Years of education: Not on file    Highest education level: Not on file   Occupational History    Occupation: disabled     Employer: N/A   Tobacco Use    Smoking status: Former Smoker     Packs/day: 3.00     Years: 41.00     Pack years: 123.00     Types: Cigarettes     Quit date: 2000     Years since quittin.5    Smokeless tobacco: Never Used    Tobacco comment: quit in    Vaping Use    Vaping Use: Never used   Substance and Sexual Activity    Alcohol use: No     Alcohol/week: 0.0 standard drinks    Drug use: No    Sexual activity: Not Currently   Other Topics Concern    Not on file   Social History Narrative    Not on file     Social Determinants of Health     Financial Resource Strain:     Difficulty of Paying Living Expenses:    Food Insecurity:     Worried About Running Out of Food in the Last Year:     Ran Out of Food in the Last Year:    Transportation Needs:     Lack of Transportation (Medical):      Lack of Transportation (Non-Medical):    Physical Activity:     Days of Exercise per Week:     Minutes of Exercise per Session:    Stress:     Feeling of Stress :    Social Connections:     Frequency of Communication with Friends and Family:     Frequency of Social Gatherings with Friends and Family:     Attends Advent Services:     Active Member of Clubs or Organizations:     Attends Club or Organization Meetings:     Marital Status:    Intimate Partner Violence:     Fear of Current or Ex-Partner:     Emotionally Abused:     Physically Abused:     Sexually Abused:          Review of Systems:  Review of Systems   Constitutional: Negative. HENT: Positive for hearing loss. Hearing aids   Eyes: Positive for blurred vision. Cardiovascular: Negative. Respiratory:        Uses cpap   Endocrine:        Diabetic   Hematologic/Lymphatic: Bruises/bleeds easily. Skin: Negative. Musculoskeletal: Positive for back pain and joint pain. Knees and elbows   Gastrointestinal: Negative. Genitourinary: Negative. Neurological: Positive for headaches. Uses walker at home   Psychiatric/Behavioral: Negative. Physical Exam:  LMP  (LMP Unknown)     Physical Exam  Skin:         Neurological:      Mental Status: She is alert and oriented to person, place, and time. Psychiatric:         Mood and Affect: Mood normal.         Thought Content:  Thought content normal.           Assessment:      Problem List Items Addressed This Visit     Spondylarthrosis - Primary    Sacroiliitis, not elsewhere classified (HCC) (Chronic)    Polyneuropathy, unspecified    Osteoarthritis of cervical spine (Chronic)    Neuropathy    Morbid obesity with BMI of 40.0-44.9, adult (HCC)    Medication monitoring encounter (Chronic)    Lumbosacral spondylosis without myelopathy (Chronic)    Lumbar radiculopathy, chronic    DDD (degenerative disc disease), lumbar    DDD (degenerative disc disease), cervical (Chronic)    Chronic, continuous use of opioids    Bilateral low back pain with sciatica            Treatment Plan:  DISCUSSION: Treatment options discussed withpatient and all questions answered to patient's satisfaction. Possible side effects, risk of tolerance and or dependence and alternative treatments discussed    Obtaining appropriate analgesic effect of treatment   No signs of potential drug abuse or diversion identified    [x] Ill effects of being on chronic pain medications such as sleep disturbances, respiratory depression, hormonal changes, withdrawal symptoms, chronic opioid dependence and tolerance as well as risk of taking opioids with Benzodiazepines and taking opioids along with alcohol,  werediscussed with patient. I had asked the patient to minimize medication use and utilize pain medications only for uncontrolled rest pain or pain with exertional activities. I advised patient not to self-escalate painmedications without consulting with us. At each of patient's future visits we will try to taper pain medications, while adjusting the adjunct medications, and re-evaluating for Physical Therapy to improve spinal andjoint strength. We will continue to have discussions to decrease pain medications as tolerated. Counseled patient on effects their pain medication and /or their medical condition mayhave on their  ability to drive or operate machinery. Instructed not to drive or operate machinery if drowsy     I also discussed with the patient regarding the dangers of combining narcotic pain medication with tranquilizers, alcohol or illegal drugs or taking the medication any way other than prescribed. The dangers were discussed  including respiratory depression and death. Patient was told to tell  all  physicians regarding the medications he is getting from pain clinic. Patient is warned not to take any unprescribed medications over-the-countermedications that can depress breathing . Patient is advised to talk to the pharmacist or physicians if planning to take any over-the-counter medications before  takeing them.  Patient is strongly advised to avoid tranquilizers or  relaxants, illegal drugs  or any medications that can depress breathing  Patient is also advised to tell us if there is any changes in their medications from other physicians.             TREATMENT OPTIONS:       Medication Agreement Requirements Met  Continue Opioid therapy  Script written for  Percocet  Follow up appointment made

## 2021-08-11 ENCOUNTER — TELEPHONE (OUTPATIENT)
Dept: FAMILY MEDICINE CLINIC | Age: 71
End: 2021-08-11

## 2021-08-11 NOTE — TELEPHONE ENCOUNTER
Samantha Miranda from [facility] [employer] [facility] states patient has a reassessment for [employer] and wants to know if you have any input/concerns for her and if she needs any particular services. Please advise.

## 2021-08-11 NOTE — TELEPHONE ENCOUNTER
Pt has not seen since 2/21 in office. They can send us report if they have any concerns. We can schedule her in office.

## 2021-08-11 NOTE — TELEPHONE ENCOUNTER
Spoke with [facility] and informed her to send over reports per Dr. Tracy Dalal. Patient is scheduled 9/13 for [facility]. She states to send them an updated medication list and office note after that appointment for the  to have record.

## 2021-08-16 ENCOUNTER — OFFICE VISIT (OUTPATIENT)
Dept: PODIATRY | Age: 71
End: 2021-08-16
Payer: MEDICARE

## 2021-08-16 VITALS — BODY MASS INDEX: 46.01 KG/M2 | HEIGHT: 62 IN | WEIGHT: 250 LBS

## 2021-08-16 DIAGNOSIS — E11.51 TYPE 2 DIABETES MELLITUS WITH PERIPHERAL VASCULAR DISEASE (HCC): ICD-10-CM

## 2021-08-16 DIAGNOSIS — M79.675 PAIN OF TOES OF BOTH FEET: ICD-10-CM

## 2021-08-16 DIAGNOSIS — M79.674 PAIN OF TOES OF BOTH FEET: ICD-10-CM

## 2021-08-16 DIAGNOSIS — B35.1 ONYCHOMYCOSIS OF TOENAIL: Primary | ICD-10-CM

## 2021-08-16 PROCEDURE — 11721 DEBRIDE NAIL 6 OR MORE: CPT | Performed by: PODIATRIST

## 2021-08-17 ASSESSMENT — ENCOUNTER SYMPTOMS
COLOR CHANGE: 0
SHORTNESS OF BREATH: 0
NAUSEA: 0
DIARRHEA: 0
BACK PAIN: 0

## 2021-08-17 NOTE — PROGRESS NOTES
SUBJECTIVE: Fabiana Patrick is a 69 y.o. female who returns to the office with chief complaint of painful fungal toenails. Patient relates toe nails are thickened/difficult to trim as well as painful with ambulation and with shoe gear. Chief Complaint   Patient presents with    Nail Problem     b/l nail trim/ last seen Dr. Mitch Aponte 4/26/2021    Diabetes     last blood sugar 135     Review of Systems   Constitutional: Negative for activity change, appetite change, chills, diaphoresis, fatigue and fever. Respiratory: Negative for shortness of breath. Cardiovascular: Negative for leg swelling. Gastrointestinal: Negative for diarrhea and nausea. Endocrine: Negative for cold intolerance, heat intolerance and polyuria. Musculoskeletal: Positive for arthralgias. Negative for back pain, gait problem, joint swelling and myalgias. Skin: Negative for color change, pallor, rash and wound. Allergic/Immunologic: Negative for environmental allergies and food allergies. Neurological: Negative for dizziness, weakness, light-headedness and numbness. Hematological: Does not bruise/bleed easily. Psychiatric/Behavioral: Negative for behavioral problems, confusion and self-injury. The patient is not nervous/anxious. OBJECTIVE: Clinical evaluation of patient reveals nails 1,2,3,4,5 of the right foot and nails 1,2,3,4,5, of the left foot to present with thickness, elongation, discoloration, brittleness, and subungual debris. There was pain with palpation and debridement of the toenails of the bilateral feet. No open lesions noted to either foot today. The right DP pulse is not palpable. The left DP pulse is palpable. The right PT pulse is not palpable. The left PT pulse is not palpable. Protective sensation is present to the right plantar foot as noted with a 5.07 Stanwood-Alejandra monofilament.    Protective sensation is present to the left plantar foot as noted with a 5.07 Stanwood-Alejandra

## 2021-08-20 DIAGNOSIS — E11.42 TYPE 2 DIABETES MELLITUS WITH DIABETIC POLYNEUROPATHY, WITH LONG-TERM CURRENT USE OF INSULIN (HCC): Chronic | ICD-10-CM

## 2021-08-20 DIAGNOSIS — Z79.4 TYPE 2 DIABETES MELLITUS WITH DIABETIC POLYNEUROPATHY, WITH LONG-TERM CURRENT USE OF INSULIN (HCC): Chronic | ICD-10-CM

## 2021-08-20 RX ORDER — BLOOD SUGAR DIAGNOSTIC
STRIP MISCELLANEOUS
Qty: 100 STRIP | Refills: 0 | Status: SHIPPED | OUTPATIENT
Start: 2021-08-20 | End: 2022-04-11

## 2021-08-20 NOTE — TELEPHONE ENCOUNTER
Please Approve or Refuse. Send to Pharmacy per Pt's Request:      Next Visit Date:  9/13/2021   Last Visit Date: 2/25/2021    Hemoglobin A1C (%)   Date Value   02/25/2021 7.8   09/14/2020 10.6   02/12/2020 8.4             ( goal A1C is < 7)   BP Readings from Last 3 Encounters:   05/26/21 (!) 110/90   04/30/21 120/76   04/26/21 122/65          (goal 120/80)  BUN   Date Value Ref Range Status   05/26/2021 21 8 - 23 mg/dL Final     CREATININE   Date Value Ref Range Status   05/26/2021 1.06 (H) 0.50 - 0.90 mg/dL Final     Potassium   Date Value Ref Range Status   05/26/2021 5.2 3.7 - 5.3 mmol/L Final     Comment:     SPECIMEN SLIGHTLY HEMOLYZED, RESULTS MAY BE ADVERSELY AFFECTED.

## 2021-08-21 DIAGNOSIS — Z79.4 TYPE 2 DIABETES MELLITUS WITH DIABETIC POLYNEUROPATHY, WITH LONG-TERM CURRENT USE OF INSULIN (HCC): Chronic | ICD-10-CM

## 2021-08-21 DIAGNOSIS — E11.42 TYPE 2 DIABETES MELLITUS WITH DIABETIC POLYNEUROPATHY, WITH LONG-TERM CURRENT USE OF INSULIN (HCC): Chronic | ICD-10-CM

## 2021-08-23 RX ORDER — BLOOD SUGAR DIAGNOSTIC
STRIP MISCELLANEOUS
Qty: 100 STRIP | Refills: 0 | Status: SHIPPED | OUTPATIENT
Start: 2021-08-23 | End: 2022-07-25

## 2021-08-27 ENCOUNTER — HOSPITAL ENCOUNTER (OUTPATIENT)
Dept: PAIN MANAGEMENT | Age: 71
Discharge: HOME | End: 2021-08-27
Payer: MEDICARE

## 2021-08-27 DIAGNOSIS — M54.16 LUMBAR RADICULOPATHY, CHRONIC: ICD-10-CM

## 2021-08-27 DIAGNOSIS — M46.1 SACROILIITIS, NOT ELSEWHERE CLASSIFIED (HCC): Chronic | ICD-10-CM

## 2021-08-27 DIAGNOSIS — Z51.81 MEDICATION MONITORING ENCOUNTER: Primary | Chronic | ICD-10-CM

## 2021-08-27 DIAGNOSIS — M50.30 DDD (DEGENERATIVE DISC DISEASE), CERVICAL: Chronic | ICD-10-CM

## 2021-08-27 DIAGNOSIS — M47.817 LUMBOSACRAL SPONDYLOSIS WITHOUT MYELOPATHY: Chronic | ICD-10-CM

## 2021-08-27 PROCEDURE — 99442 PR PHYS/QHP TELEPHONE EVALUATION 11-20 MIN: CPT | Performed by: NURSE PRACTITIONER

## 2021-08-27 PROCEDURE — 99213 OFFICE O/P EST LOW 20 MIN: CPT

## 2021-08-27 RX ORDER — OXYCODONE HYDROCHLORIDE AND ACETAMINOPHEN 5; 325 MG/1; MG/1
1 TABLET ORAL SEE ADMIN INSTRUCTIONS
Qty: 100 TABLET | Refills: 0 | Status: SHIPPED | OUTPATIENT
Start: 2021-08-28 | End: 2021-09-27 | Stop reason: SDUPTHER

## 2021-08-27 ASSESSMENT — ENCOUNTER SYMPTOMS
SHORTNESS OF BREATH: 0
COUGH: 0
BACK PAIN: 1
CONSTIPATION: 0

## 2021-08-27 NOTE — PROGRESS NOTES
(congestive heart failure) (HCC)     Chronic kidney disease     CKD (chronic kidney disease) stage 3, GFR 30-59 ml/min ([facility])     COPD (chronic obstructive pulmonary disease) ([facility])     emphysema    Degeneration of lumbar or lumbosacral intervertebral disc     Depression     DM (diabetes mellitus) ([facility])     Emphysema of lung (HCC)     Gastritis     GERD (gastroesophageal reflux disease)     Hearing loss     Hematuria     Hiatal hernia     HTN (hypertension), benign 6/28/2014    Hypertriglyceridemia     Incontinence of urine 9/25/2013    Irritable bowel syndrome with both constipation and diarrhea 1/26/2021    Knee arthropathy     Lumbosacral spondylosis without myelopathy 9/29/2014    Migraine headache     DR. BASIL [place]     Neuropathy     Obesity     On home oxygen therapy     2 L PER [place]  HS AND PRN    HIGINIO on CPAP     Otitis media 1-26-15    Secondary diabetes mellitus with stage 3 chronic kidney disease (GFR 30-59) ([facility])     Stroke ([facility] [address]      mini stroke.  Tinnitus     Type 2 diabetes mellitus without complication ([facility])     Type II or unspecified type diabetes mellitus without mention of complication, not stated as uncontrolled     UTI (lower urinary tract infection)     Varicose vein        Past Surgical History:   Procedure Laterality Date    ABLATION OF DYSRHYTHMIC FOCUS  2000    CARPAL TUNNEL RELEASE Right     CATARACT REMOVAL WITH IMPLANT Bilateral     CHOLECYSTECTOMY  2007    COLONOSCOPY      COLONOSCOPY  04/10/2017    tubular adenomo polyp , mod. sigmoid diverticulosis, min. int. hemorrhoids    COLONOSCOPY N/A 3/2/2021    COLONOSCOPY WITH BIOPSY performed by Alejo Marc MD at [place] [address]  9/25/2013    DILATION AND CURETTAGE  1979    EYE SURGERY      laser    INCONTINENCE SURGERY      Percutaneous nerve stimulation Dr Jasson Amaya 2013     INSERTABLE CARDIAC MONITOR  12/04/2015    [employer]VE[employer] MODEL #[id number] MRI CONDTIONAL OK 3T.  IMMEDIATELY POST IMPLANT    OTHER SURGICAL HISTORY  09/10/15    removal of interstim    [place] COLSC FLX W/REMOVAL LESION BY HOT BX FORCEPS N/A 4/10/2017    COLONOSCOPY POLYPECTOMY HOT BIOPSY performed by Cary Rodriguez MD at [address] [place] [address]      TYMPANOPLASTY      TYMPANOPLASTY      TYMPANOSTOMY TUBE PLACEMENT  08/21/2017    UPPER GASTROINTESTINAL ENDOSCOPY  03/2016    Dr Virgilio Mathews N/A 3/2/2021    EGD BIOPSY performed by Ruthann Peralta MD at [facility] ENDO       Allergies   Allergen Reactions    Robitussin [Guaifenesin] Anaphylaxis    Codeine Swelling    Compazine [Prochlorperazine Maleate] Hives and Swelling    Iodides Itching    Morphine Other (See Comments)     hallucinations    Moxifloxacin      Other reaction(s): Intolerance-unknown  Other reaction(s): Intolerance-unknown    Reglan [Metoclopramide] Nausea Only    Avelox [Moxifloxacin Hcl In Nacl] Rash     Dermatitis      Cipro Xr Rash     dermatitis    Sulfa Antibiotics Rash         Current Outpatient Medications:     blood glucose test strips (ONETOUCH ULTRA) strip, TEST TWO (2) TO THREE (3) TIMES A DAY & AS NEEDED FOR SYMPTOMS OF IRREGULAR BLOOD GLUCOSE, Disp: 100 strip, Rfl: 0    blood glucose test strips (ONETOUCH ULTRA) strip, TEST TWO (2) TO THREE (3) TIMES A DAY & AS NEEDED FOR SYMPTOMS OF IRREGULAR BLOOD GLUCOSE, Disp: 100 strip, Rfl: 0    oxyCODONE-acetaminophen (PERCOCET) 5-325 MG per tablet, Take 1 tablet by mouth See Admin Instructions for 30 days. Intended supply: 30 days. 1 tab 3-4 times a day prn, Disp: 100 tablet, Rfl: 0    insulin glargine (BASAGLAR KWIKPEN) 100 UNIT/ML injection pen, INJECT 42 UNITS INTO THE SKIN 2 TIMES DAILY. , Disp: 5 pen, Rfl: 3    ASPIRIN LOW DOSE 81 MG EC tablet, TAKE 1 TAB BY MOUTH ONCE A DAY , Disp: 90 tablet, Rfl: 3    clopidogrel (PLAVIX) 75 MG tablet, TAKE 1 TAB BY MOUTH ONCE A DAY ( IN THE MORNING ) -THIS MEDICINE MAY BE TAKENWITH OR WITHOUT FOOD , Disp: 90 tablet, Rfl: 1    Blood Glucose Monitoring Suppl (ONE TOUCH ULTRA 2) w/Device KIT, , Disp: , Rfl:     ofloxacin (OCUFLOX) 0.3 % solution, , Disp: , Rfl:     ONETOUCH ULTRA strip, TEST TWO (2) TO THREE (3) TIMES A DAY& AS NEEDED , Disp: 100 each, Rfl: 0    insulin aspart (NOVOLOG FLEXPEN) 100 UNIT/ML injection pen, IF <139 - NO INSULIN; 140-199-2 UNITS;200-249-4 UNITS;250-299-6 UNITS;300-349-8 UNITS;350-400=10 UNITS;ABOVE 400-12 UNITS, Disp: 15 mL, Rfl: 3    atorvastatin (LIPITOR) 40 MG tablet, Take 1 tablet by mouth daily, Disp: 90 tablet, Rfl: 3    Multiple Vitamins-Minerals (THERAPEUTIC MULTIVITAMIN-MINERALS) tablet, Take 1 tablet by mouth daily Takes Women over 50 Complete Vitamin daily ([employer] brand), Disp: , Rfl:     BiPAP Machine MISC, repair/replace Bipap maintain 18/9CMH2O, Cflex=3,mask,tubing,filters,headgear,heated humidifier,all supplies PRN, Disp: , Rfl:     Psyllium (METAMUCIL PO), Take by mouth 3 times daily Takes powder, Disp: , Rfl:     blood glucose test strips (TRUE METRIX BLOOD GLUCOSE TEST) strip, THREE TIMES A DAY, Disp: 100 each, Rfl: 3    ferrous sulfate (IRON 325) 325 (65 Fe) MG tablet, TAKE 1 TAB BY MOUTH EVERY MORNING , Disp: 90 tablet, Rfl: 5    citalopram (CELEXA) 40 MG tablet, TAKE 1 2 TABLET BY MOUTH TWICE A DAY (Patient taking differently: Take 20 mg by mouth 2 times daily ), Disp: 90 tablet, Rfl: 3    carvedilol (COREG) 3.125 MG tablet, TAKE 1 TAB BY MOUTH TWICE A DAY ( IN THE MORNING AND BEDTIME ) , Disp: 180 tablet, Rfl: 3    losartan (COZAAR) 25 MG tablet, TAKE 1 TAB BY MOUTH ONCE A DAY ( IN THE MORNING ), Disp: 90 tablet, Rfl: 5    magnesium oxide (MAG-OX) 400 MG tablet, TAKE 1 TAB BY MOUTH TWICE A DAY ( IN THE MORNING AND BEDTIME ), Disp: 180 tablet, Rfl: 3    topiramate (TOPAMAX) 100 MG tablet, Take 1 tablet by mouth nightly, Disp: 90 tablet, Rfl: 2    dicyclomine (BENTYL) 10 MG capsule, Take 1 capsule by mouth 2 times daily as needed (abdominal spasm), Disp: 180 capsule, Rfl: 0    albuterol (PROVENTIL) (2.5 MG/3ML) 0.083% nebulizer solution, Take 3 mLs by nebulization every 6 hours as needed for Wheezing, Disp: 120 each, Rfl: 3    furosemide (LASIX) 20 MG tablet, TAKE 1 TAB BY MOUTH ONCE A DAY AS NEEDED ( WEIGHT GAIN 3 LBS OVERNIGHT ) , Disp: 30 tablet, Rfl: 3    zoster recombinant adjuvanted vaccine (SHINGRIX) 50 MCG/0.5ML SUSR injection, 50 MCG IM then repeat 2-6 months., Disp: 0.5 mL, Rfl: 1    Icosapent Ethyl (VASCEPA) 1 g CAPS capsule, Take 1 capsule by mouth 2 times daily, Disp: 60 capsule, Rfl: 3    vitamin B-12 (CYANOCOBALAMIN) 100 MCG tablet, Take 50 mcg by mouth daily, Disp: , Rfl:     isosorbide dinitrate (ISORDIL) 30 MG tablet, Take 30 mg by mouth, Disp: , Rfl:     ULTICARE MINI PEN NEEDLES 31G X 6 MM MISC, USE AS DIRECTED , Disp: 100 each, Rfl: 5    metFORMIN (GLUCOPHAGE) 1000 MG tablet, TAKE 1 TAB BY MOUTH TWICE A DAY ( IN THE MORNING AND BEDTIME ) , Disp: 180 tablet, Rfl: 3    gabapentin (NEURONTIN) 300 MG capsule, Take 1 capsule by mouth 3 times daily for 30 days. , Disp: 90 capsule, Rfl: 2    nystatin (MYCOSTATIN) 293348 UNIT/GM powder, Apply 3 times daily. , Disp: 3 Bottle, Rfl: 3    aspirin 81 MG chewable tablet, Take 1 tablet by mouth daily, Disp: 90 tablet, Rfl: 3    lidocaine (LMX) 4 % cream, Apply topically every 8 hrs as needed for pain, Disp: 45 g, Rfl: 3    Lift Chair MISC, by Does not apply route, Disp: 1 each, Rfl: 0    Misc.  Devices (ADJUST BATH/SHOWER SEAT/BACK) MISC, Use daily for shower, Disp: 1 each, Rfl: 0    pantoprazole (PROTONIX) 40 MG tablet, Take 1 tablet by mouth 2 times daily, Disp: 60 tablet, Rfl: 3    Alcohol Swabs (B-D SINGLE USE SWABS REGULAR) PADS, THREE TIMES A DAY, Disp: 100 each, Rfl: 0    nitroGLYCERIN (NITROSTAT) 0.4 MG SL tablet, 1 under the tongue as needed for angina, may repeat q5mins for up three doses, Disp: , Rfl:     Blood Glucose Monitoring Suppl HARMEET, Use daily to check BS, Disp: 1 Device, Rfl: 0    ipratropium-albuterol (DUONEB) 0.5-2.5 (3) MG/3ML SOLN nebulizer solution, Inhale 3 mLs into the lungs every 4 hours, Disp: 360 mL, Rfl: 2    Melatonin 10 MG TABS, Take 10 mg by mouth nightly, Disp: 90 tablet, Rfl: 3    Compression Stockings MISC, by Does not apply route Pressure between 20 - 25 ., Disp: 1 each, Rfl: 0    docusate sodium (COLACE) 100 MG capsule, Take 1 capsule by mouth 2 times daily Please use while taking Percocet, Disp: 30 capsule, Rfl: 0    SYMBICORT 160-4.5 MCG/ACT AERO,  2 puffs As needed for sob, Disp: , Rfl:     OXYGEN, Inhale 3 L into the lungs , Disp: , Rfl:     Family History   Problem Relation Age of Onset    Diabetes Mother     Heart Disease Mother     Stomach Cancer Father     Diabetes Maternal Grandmother         also aunts and uncles (maternal)    Emphysema Sister        Social History     Socioeconomic History    Marital status: Legally      Spouse name: Not on file    Number of children: Not on file    Years of education: Not on file    Highest education level: Not on file   Occupational History    Occupation: disabled     Employer: N/A   Tobacco Use    Smoking status: Former Smoker     Packs/day: 3.00     Years: 41.00     Pack years: 123.00     Types: Cigarettes     Quit date: 2000     Years since quittin.6    Smokeless tobacco: Never Used    Tobacco comment: quit in    Vaping Use    Vaping Use: Never used   Substance and Sexual Activity    Alcohol use: No     Alcohol/week: 0.0 standard drinks    Drug use: No    Sexual activity: Not Currently   Other Topics Concern    Not on file   Social History Narrative    Not on file     Social Determinants of Health     Financial Resource Strain:     Difficulty of Paying Living Expenses:    Food Insecurity:     Worried About Running Out of Food in the Last Year:     Ran Out of Food in the Last Year:    Transportation Needs:     Lack of Transportation (Medical):      Lack of Transportation (Non-Medical):    Physical Activity:     Days of Exercise per Week:     Minutes of Exercise per Session:    Stress:     Feeling of Stress :    Social Connections:     Frequency of Communication with Friends and Family:     Frequency of Social Gatherings with Friends and Family:     Attends Ten[employer] Services:     Active Member of Clubs or Organizations:     Attends Club or Organization Meetings:     Marital Status:    Intimate Partner Violence:     Fear of Current or Ex-Partner:     Emotionally Abused:     Physically Abused:     Sexually Abused:        Review of Systems:  Review of Systems   Constitutional: Negative for chills and fever. Cardiovascular: Negative for chest pain and palpitations. Respiratory: Negative for cough and shortness of breath. Musculoskeletal: Positive for back pain. Gastrointestinal: Negative for constipation. Neurological: Negative for disturbances in coordination, loss of balance, numbness, paresthesias and tingling. Physical Exam:  LMP  (LMP Unknown)     Physical Exam  Neurological:      Mental Status: She is alert.    Psychiatric:         Mood and Affect: Mood normal.         Record/Diagnostics Review:    Last greg 2/2021 and was appropriate     ABERRANT BEHAVIORS SINCE LAST VISIT  Lost rx/pills:------------------------------------------ no  Taking  medication as prescribed: ----------- yes  Urine Drug Screen ---------------------------------Trey Garcia  Recent ER visits: -------------------------------------no  Pill count is appropriate: ---------------------------yes   Refills for prescriptions appropriate:---------- yes    Assessment:  Problem List Items Addressed This Visit     DDD (degenerative disc disease), cervical (Chronic)    Relevant Medications    oxyCODONE-acetaminophen (PERCOCET) 5-325 MG per tablet (Start on 8/28/2021)    Medication monitoring encounter - Primary (Chronic)    Lumbosacral spondylosis without myelopathy (Chronic) Relevant Medications    oxyCODONE-acetaminophen (PERCOCET) 5-325 MG per tablet (Start on 8/28/2021)    Sacroiliitis, not elsewhere classified ([address] (Chronic)    Relevant Medications    oxyCODONE-acetaminophen (PERCOCET) 5-325 MG per tablet (Start on 8/28/2021)    Lumbar radiculopathy, chronic    Relevant Medications    oxyCODONE-acetaminophen (PERCOCET) 5-325 MG per tablet (Start on 8/28/2021)             Treatment Plan:  Patient relates current medications are helping the pain. Patient reports taking pain medications as prescribed, denies obtaining medications from different sources and denies use of illegal drugs. Patient denies side effects from medications like nausea, vomiting, constipation or drowsiness. Patient reports current activities of daily living are possible due to medications and would like to continue them. As always, we encourage daily stretching and strengthening exercises, and recommend minimizing use of pain medications unless patient cannot get through daily activities due to pain. Contract requirements met. Continue opioid therapy. Script written for percocet  Follow up appointment made for 4 weeks    Tamia Morales, was evaluated through a synchronous (real-time) audio-video encounter. The patient (or guardian if applicable) is aware that this is a billable service. Verbal consent to proceed has been obtained within the past 12 months. The visit was conducted pursuant to the emergency declaration under the [facility], [address] [place] [address] authority and the [employer] General Act. Patient identification was verified, and a caregiver was present when appropriate. The patient was located in a state where the provider was credentialed to provide care.     Total time spent for this encounter: 20 minutes    --VERONIKA Sotelo CNP on 8/27/2021 at 10:48 AM    An electronic signature was used to authenticate this note.

## 2021-08-31 ENCOUNTER — TELEPHONE (OUTPATIENT)
Dept: PAIN MANAGEMENT | Age: 71
End: 2021-08-31

## 2021-08-31 NOTE — TELEPHONE ENCOUNTER
Spoke with Daren Huynh at [facility] and was informed that patient's Percocet is requiring at [place] in order to fill the full prescription.      Emailed third party rejection from pharmacy to [employer].

## 2021-09-27 ENCOUNTER — HOSPITAL ENCOUNTER (OUTPATIENT)
Dept: PAIN MANAGEMENT | Age: 71
Discharge: HOME | End: 2021-09-27
Payer: MEDICAID

## 2021-09-27 DIAGNOSIS — M47.817 LUMBOSACRAL SPONDYLOSIS WITHOUT MYELOPATHY: ICD-10-CM

## 2021-09-27 DIAGNOSIS — M51.36 DDD (DEGENERATIVE DISC DISEASE), LUMBAR: ICD-10-CM

## 2021-09-27 DIAGNOSIS — F11.90 CHRONIC, CONTINUOUS USE OF OPIOIDS: ICD-10-CM

## 2021-09-27 DIAGNOSIS — M50.30 DDD (DEGENERATIVE DISC DISEASE), CERVICAL: Primary | ICD-10-CM

## 2021-09-27 DIAGNOSIS — M47.892 OTHER OSTEOARTHRITIS OF SPINE, CERVICAL REGION: ICD-10-CM

## 2021-09-27 DIAGNOSIS — M25.562 CHRONIC PAIN OF LEFT KNEE: ICD-10-CM

## 2021-09-27 DIAGNOSIS — G62.9 NEUROPATHY: ICD-10-CM

## 2021-09-27 DIAGNOSIS — Z51.81 MEDICATION MONITORING ENCOUNTER: ICD-10-CM

## 2021-09-27 DIAGNOSIS — E11.42 TYPE 2 DIABETES MELLITUS WITH DIABETIC POLYNEUROPATHY, WITH LONG-TERM CURRENT USE OF INSULIN (HCC): Primary | ICD-10-CM

## 2021-09-27 DIAGNOSIS — M46.1 SACROILIITIS, NOT ELSEWHERE CLASSIFIED (HCC): ICD-10-CM

## 2021-09-27 DIAGNOSIS — Z79.4 TYPE 2 DIABETES MELLITUS WITH DIABETIC POLYNEUROPATHY, WITH LONG-TERM CURRENT USE OF INSULIN (HCC): Primary | ICD-10-CM

## 2021-09-27 DIAGNOSIS — M54.40 CHRONIC BILATERAL LOW BACK PAIN WITH SCIATICA, SCIATICA LATERALITY UNSPECIFIED: ICD-10-CM

## 2021-09-27 DIAGNOSIS — G89.29 CHRONIC BILATERAL LOW BACK PAIN WITH SCIATICA, SCIATICA LATERALITY UNSPECIFIED: ICD-10-CM

## 2021-09-27 DIAGNOSIS — M47.9 SPONDYLARTHROSIS: ICD-10-CM

## 2021-09-27 DIAGNOSIS — G89.29 CHRONIC PAIN OF LEFT KNEE: ICD-10-CM

## 2021-09-27 DIAGNOSIS — M54.16 LUMBAR RADICULOPATHY, CHRONIC: ICD-10-CM

## 2021-09-27 PROCEDURE — 99213 OFFICE O/P EST LOW 20 MIN: CPT

## 2021-09-27 PROCEDURE — 99442 PR PHYS/QHP TELEPHONE EVALUATION 11-20 MIN: CPT | Performed by: NURSE PRACTITIONER

## 2021-09-27 RX ORDER — INSULIN GLARGINE 100 [IU]/ML
42 INJECTION, SOLUTION SUBCUTANEOUS NIGHTLY
Qty: 5 PEN | Refills: 3 | Status: SHIPPED | OUTPATIENT
Start: 2021-09-27 | End: 2021-10-25

## 2021-09-27 RX ORDER — OXYCODONE HYDROCHLORIDE AND ACETAMINOPHEN 5; 325 MG/1; MG/1
1 TABLET ORAL SEE ADMIN INSTRUCTIONS
Qty: 100 TABLET | Refills: 0 | Status: ON HOLD | OUTPATIENT
Start: 2021-10-01 | End: 2021-10-08 | Stop reason: SDUPTHER

## 2021-09-27 ASSESSMENT — ENCOUNTER SYMPTOMS
GASTROINTESTINAL NEGATIVE: 1
BACK PAIN: 1

## 2021-09-27 NOTE — PROGRESS NOTES
[address] [facility] PROGRESS NOTE      Patient  completed []  video visit   [x]   phone call:         Minutes :       [x]    to  review Medication Agreement    []  Follow up after procedure   []  Discuss treatment options      Location:  Provider:  working from    [x]    home    []   [facility] - KELLEE VILLANUEVA ,   patient at home       Chief Complaint: low back pain    She c/o low back pain radiating down her legs. She reports her pain is unchanged. She has  no history of lumbar surgery. She reports she did PT in the past, not sure if it helped. She reports she is sleeping well and doing home exercises and some walking. Back Pain  This is a chronic problem. The current episode started more than 1 year ago. The problem occurs intermittently. The problem is unchanged. The pain is present in the lumbar spine. The quality of the pain is described as aching. Radiates to: legs. The pain is at a severity of 7/10. The pain is moderate. Worse during: evening. Exacerbated by: walking too far. Associated symptoms include headaches and weakness. She has tried analgesics and heat for the symptoms.            Treatment goals:  Functional status: reduce pain 5    Aberrancy:   Any alcoholic beverages      no      Any illegal drugs   no      Analgesia:   7             Adverse  Effects :no      ADL;s : home exercises    Data:    When was thelast UDS:     2-       Was the UDS appropriate:  [x] yes []   no      Record/Diagnostics Review:      As above, I did review the imaging /8/2021  7:53 AM - [place], [facility] Incoming Lab Results From [facility]    Component Value Ref Range & Units Status Collected Lab   Pain Management Drug Panel Interp, Urine Consistent   Final 02/26/2021  1:54 PM [facility]   (NOTE)   ________________________________________________________________   DRUGS EXPECTED:   OXYCODONE   ________________________________________________________________   CONSISTENT with medications provided:   OXYCODONE: based on oxycodone, noroxycodone, oxymorphone   ________________________________________________________________   INTERPRETIVE INFORMATION: Targeted drug profile Interp   Interpretation depends on accuracy and completeness of patient   medication information submitted by client. 6-Acetylmorphine, Ur Not Detected   Final 02/26/        EXAMINATION:   MRI OF THE LUMBAR SPINE WITHOUT AND WITH CONTRAST  10/23/2019 5:48 pm       TECHNIQUE:   Multiplanar multisequence MRI of the lumbar spine was performed without and   with the administration of intravenous contrast.       COMPARISON:   MRI lumbar spine 01/08/2018       HISTORY:   ORDERING SYSTEM PROVIDED HISTORY:   TECHNOLOGIST PROVIDED HISTORY:   back pain, weakness, epidural injection 5 weeks ago       FINDINGS:   BONES/ALIGNMENT: There is normal alignment of the spine. The vertebral body   heights are maintained. The bone marrow signal appears unremarkable. Multilevel Schmorl's nodes identified involving the lower thoracic and upper   lumbar spine.       SPINAL CORD:  The conus terminates normally.       SOFT TISSUES: No abnormal enhancement is seen of the lumbar spine.  No   paraspinal mass identified.       L1-L2: Minimal degenerative loss of disc space height and signal.  There is   no significant disc protrusion, spinal canal stenosis or neural foraminal   narrowing.       L2-L3: Moderate disc space is identified with mild circumferential disc   bulge.  Minimal central canal stenosis.  Mild facet arthropathy.  No central   foramina.  No focal disc protrusion.       L3-L4: Mild disc space disease identified with circumferential disc bulge. There is mild right and moderate left neural foraminal narrowing.  Moderate   degenerate facet arthropathy.  Mild central canal stenosis.       L4-L5: Mild disc space disease identified with circumferential disc bulge.    There is a right paracentral disc extrusion which extends caudally 1.2 cm and   effaces the right lateral recess affecting the traversing right L5 nerve   root.  Otherwise mild neural foraminal narrowing.  No central canal stenosis. Moderate facet arthropathy.       L5-S1: Minimal degenerative loss of disc space height and signal.  No focal   disc protrusion.  Minimal neural foraminal narrowing.  Mild-to-moderate facet   arthropathy.           Impression   Right paracentral disc extrusion L4-5 narrowing the right lateral recess. Please correlate with possible right L5 radiculopathy.  This is new from   previous MRI.       Otherwise mild degenerate change.       No evidence of discitis osteomyelitis or epidural abscess.                           Pill count: appropriate    fill date :10-1-2021    Morphine equivalent dose as reported on [place]ARRS: 22.50  Periodic Controlled Substance Monitoring: Possible medication side effects, risk of tolerance/dependence & alternative treatments discussed., No signs of potential drug abuse or diversion identified. , Assessed functional status., Obtaining appropriate analgesic effect of treatment. Heath Berkowitz, APRN - CNP)  Review of[place]ARRS does not show any aberrant prescription behavior. Medication is helping the patient stay active. Patient denies any side effects and reports adequate analgesia. No sign of misuse/abuse. Past Medical History:   Diagnosis Date    Abdominal bloating 1/26/2021    Adenomatous polyp of ascending colon 1/26/2021    Allergic rhinitis     Anxiety 7/17/2013    Arthritis     Asthma     Atrial fibrillation (HCC)     Back pain     NERVE/DR. GRACIA    Benign hypertension with CKD (chronic kidney disease) stage III ([facility])     CAD (coronary artery disease) 3/21/2013    Caffeine use     2 coffee/day    CHF (congestive heart failure) ([facility])     Chronic kidney disease     CKD (chronic kidney disease) stage 3, GFR 30-59 ml/min ([facility])     COPD (chronic obstructive pulmonary disease) ([facility])     emphysema    Degeneration of lumbar or lumbosacral intervertebral disc     Depression     DM (diabetes mellitus) ([facility] [address]     Emphysema of lung (HCC)     Gastritis     GERD (gastroesophageal reflux disease)     Hearing loss     Hematuria     Hiatal hernia     HTN (hypertension), benign 6/28/2014    Hypertriglyceridemia     Incontinence of urine 9/25/2013    Irritable bowel syndrome with both constipation and diarrhea 1/26/2021    Knee arthropathy     Lumbosacral spondylosis without myelopathy 9/29/2014    Migraine headache     DR. BASIL [place]     Neuropathy     Obesity     On home oxygen therapy     2 L PER NC  HS AND PRN    HIGINIO on CPAP     Otitis media 1-26-15    Secondary diabetes mellitus with stage 3 chronic kidney disease (GFR 30-59) (HCC)     Stroke ([facility] [address]      mini stroke.  Tinnitus     Type 2 diabetes mellitus without complication (HCC)     Type II or unspecified type diabetes mellitus without mention of complication, not stated as uncontrolled     UTI (lower urinary tract infection)     Varicose vein        Past Surgical History:   Procedure Laterality Date    ABLATION OF DYSRHYTHMIC FOCUS  2000    CARPAL TUNNEL RELEASE Right     CATARACT REMOVAL WITH IMPLANT Bilateral     CHOLECYSTECTOMY  2007    COLONOSCOPY      COLONOSCOPY  04/10/2017    tubular adenomo polyp , mod. sigmoid diverticulosis, min. int. hemorrhoids    COLONOSCOPY N/A 3/2/2021    COLONOSCOPY WITH BIOPSY performed by Dago Bolton MD at [place] [address]  9/25/2013    DILATION AND CURETTAGE  1979    EYE SURGERY      laser    INCONTINENCE SURGERY      Percutaneous nerve stimulation Dr Sandra Amaya 2013     INSERTABLE CARDIAC MONITOR  12/04/2015    [employer] REVEAL LINQ MODEL #[id number] MRI CONDTIONAL [place] 3T.  IMMEDIATELY POST IMPLANT    OTHER SURGICAL HISTORY  09/10/15    removal of interstim    MA COLSC FLX W/REMOVAL LESION BY HOT BX FORCEPS N/A 4/10/2017    COLONOSCOPY POLYPECTOMY HOT BIOPSY performed by Alexi Walton MD at [address] [place] [address] LIGATION      TYMPANOPLASTY      TYMPANOPLASTY      TYMPANOSTOMY TUBE PLACEMENT  08/21/2017    UPPER GASTROINTESTINAL ENDOSCOPY  03/2016    Dr Carl Nile ENDOSCOPY N/A 3/2/2021    EGD BIOPSY performed by Jas Lombardi MD at [facility]       Allergies   Allergen Reactions    Robitussin [Guaifenesin] Anaphylaxis    Codeine Swelling    Compazine [Prochlorperazine Maleate] Hives and Swelling    Iodides Itching    Morphine Other (See Comments)     hallucinations    Moxifloxacin      Other reaction(s): Intolerance-unknown  Other reaction(s): Intolerance-unknown    Reglan [Metoclopramide] Nausea Only    Avelox [Moxifloxacin Hcl In Nacl] Rash     Dermatitis      Cipro Xr Rash     dermatitis    Sulfa Antibiotics Rash         Current Outpatient Medications:     oxyCODONE-acetaminophen (PERCOCET) 5-325 MG per tablet, Take 1 tablet by mouth See Admin Instructions for 30 days. Intended supply: 30 days. 1 tab 3-4 times a day prn, Disp: 100 tablet, Rfl: 0    insulin glargine (BASAGLAR KWIKPEN) 100 UNIT/ML injection pen, INJECT 42 UNITS INTO THE SKIN 2 TIMES DAILY. , Disp: 5 pen, Rfl: 3    clopidogrel (PLAVIX) 75 MG tablet, TAKE 1 TAB BY MOUTH ONCE A DAY ( IN THE MORNING ) -THIS MEDICINE MAY BE TAKENWITH OR WITHOUT FOOD , Disp: 90 tablet, Rfl: 1    atorvastatin (LIPITOR) 40 MG tablet, Take 1 tablet by mouth daily, Disp: 90 tablet, Rfl: 3    Multiple Vitamins-Minerals (THERAPEUTIC MULTIVITAMIN-MINERALS) tablet, Take 1 tablet by mouth daily Takes Women over 50 Complete Vitamin daily ([employer] brand), Disp: , Rfl:     Psyllium (METAMUCIL PO), Take by mouth 3 times daily Takes powder, Disp: , Rfl:     citalopram (CELEXA) 40 MG tablet, TAKE 1 2 TABLET BY MOUTH TWICE A DAY (Patient taking differently: Take 20 mg by mouth 2 times daily ), Disp: 90 tablet, Rfl: 3    losartan (COZAAR) 25 MG tablet, TAKE 1 TAB BY MOUTH ONCE A DAY ( IN THE MORNING ), Disp: 90 tablet, Rfl: 5    magnesium oxide (MAG-OX) 400 MG tablet, TAKE 1 TAB BY MOUTH TWICE A DAY ( IN THE MORNING AND BEDTIME ), Disp: 180 tablet, Rfl: 3    topiramate (TOPAMAX) 100 MG tablet, Take 1 tablet by mouth nightly, Disp: 90 tablet, Rfl: 2    Icosapent Ethyl (VASCEPA) 1 g CAPS capsule, Take 1 capsule by mouth 2 times daily, Disp: 60 capsule, Rfl: 3    vitamin B-12 (CYANOCOBALAMIN) 100 MCG tablet, Take 50 mcg by mouth daily, Disp: , Rfl:     isosorbide dinitrate (ISORDIL) 30 MG tablet, Take 30 mg by mouth, Disp: , Rfl:     gabapentin (NEURONTIN) 300 MG capsule, Take 1 capsule by mouth 3 times daily for 30 days. , Disp: 90 capsule, Rfl: 2    aspirin 81 MG chewable tablet, Take 1 tablet by mouth daily, Disp: 90 tablet, Rfl: 3    pantoprazole (PROTONIX) 40 MG tablet, Take 1 tablet by mouth 2 times daily, Disp: 60 tablet, Rfl: 3    blood glucose test strips (ONETOUCH ULTRA) strip, TEST TWO (2) TO THREE (3) TIMES A DAY & AS NEEDED FOR SYMPTOMS OF IRREGULAR BLOOD GLUCOSE, Disp: 100 strip, Rfl: 0    blood glucose test strips (ONETOUCH ULTRA) strip, TEST TWO (2) TO THREE (3) TIMES A DAY & AS NEEDED FOR SYMPTOMS OF IRREGULAR BLOOD GLUCOSE, Disp: 100 strip, Rfl: 0    ASPIRIN LOW DOSE 81 MG EC tablet, TAKE 1 TAB BY MOUTH ONCE A DAY , Disp: 90 tablet, Rfl: 3    Blood Glucose Monitoring Suppl (ONE TOUCH ULTRA 2) w/Device KIT, , Disp: , Rfl:     ofloxacin (OCUFLOX) 0.3 % solution, , Disp: , Rfl:     ONETOUCH ULTRA strip, TEST TWO (2) TO THREE (3) TIMES A DAY& AS NEEDED , Disp: 100 each, Rfl: 0    insulin aspart (NOVOLOG FLEXPEN) 100 UNIT/ML injection pen, IF <139 - NO INSULIN; 140-199-2 UNITS;200-249-4 UNITS;250-299-6 UNITS;300-349-8 UNITS;350-400=10 UNITS;ABOVE 400-12 UNITS, Disp: 15 mL, Rfl: 3    BiPAP Machine MISC, repair/replace Bipap maintain 18/9CMH2O, Cflex=3,mask,tubing,filters,headgear,heated humidifier,all supplies PRN, Disp: , Rfl:     blood glucose test strips (TRUE METRIX BLOOD GLUCOSE TEST) strip, THREE TIMES A DAY, Disp: 100 each, Rfl: 3    ferrous sulfate (IRON 325) 325 (65 Fe) MG tablet, TAKE 1 TAB BY MOUTH EVERY MORNING , Disp: 90 tablet, Rfl: 5    carvedilol (COREG) 3.125 MG tablet, TAKE 1 TAB BY MOUTH TWICE A DAY ( IN THE MORNING AND BEDTIME ) , Disp: 180 tablet, Rfl: 3    dicyclomine (BENTYL) 10 MG capsule, Take 1 capsule by mouth 2 times daily as needed (abdominal spasm), Disp: 180 capsule, Rfl: 0    albuterol (PROVENTIL) (2.5 MG/3ML) 0.083% nebulizer solution, Take 3 mLs by nebulization every 6 hours as needed for Wheezing, Disp: 120 each, Rfl: 3    furosemide (LASIX) 20 MG tablet, TAKE 1 TAB BY MOUTH ONCE A DAY AS NEEDED ( WEIGHT GAIN 3 LBS OVERNIGHT ) , Disp: 30 tablet, Rfl: 3    ULTICARE MINI PEN NEEDLES 31G X 6 MM MISC, USE AS DIRECTED , Disp: 100 each, Rfl: 5    metFORMIN (GLUCOPHAGE) 1000 MG tablet, TAKE 1 TAB BY MOUTH TWICE A DAY ( IN THE MORNING AND BEDTIME ) , Disp: 180 tablet, Rfl: 3    nystatin (MYCOSTATIN) 484367 UNIT/GM powder, Apply 3 times daily. , Disp: 3 Bottle, Rfl: 3    lidocaine (LMX) 4 % cream, Apply topically every 8 hrs as needed for pain, Disp: 45 g, Rfl: 3    Lift Chair MISC, by Does not apply route, Disp: 1 each, Rfl: 0    Misc.  Devices (ADJUST BATH/SHOWER SEAT/BACK) MISC, Use daily for shower, Disp: 1 each, Rfl: 0    Alcohol Swabs (B-D SINGLE USE SWABS REGULAR) PADS, THREE TIMES A DAY, Disp: 100 each, Rfl: 0    nitroGLYCERIN (NITROSTAT) 0.4 MG SL tablet, 1 under the tongue as needed for angina, may repeat q5mins for up three doses, Disp: , Rfl:     Blood Glucose Monitoring Suppl HARMEET, Use daily to check BS, Disp: 1 Device, Rfl: 0    ipratropium-albuterol (DUONEB) 0.5-2.5 (3) MG/3ML SOLN nebulizer solution, Inhale 3 mLs into the lungs every 4 hours, Disp: 360 mL, Rfl: 2    Melatonin 10 MG TABS, Take 10 mg by mouth nightly, Disp: 90 tablet, Rfl: 3    Compression Stockings MISC, by Does not apply route Pressure between 20 - 25 ., Disp: 1 each, Rfl: 0    docusate sodium (COLACE) 100 MG capsule, Take 1 capsule by mouth 2 times daily Please use while taking Percocet, Disp: 30 capsule, Rfl: 0    SYMBICORT 160-4.5 MCG/ACT AERO,  2 puffs As needed for sob, Disp: , Rfl:     OXYGEN, Inhale 3 L into the lungs , Disp: , Rfl:     Family History   Problem Relation Age of Onset    Diabetes Mother     Heart Disease Mother     Stomach Cancer Father     Diabetes Maternal Grandmother         also aunts and uncles (maternal)    Emphysema Sister        Social History     Socioeconomic History    Marital status: Legally      Spouse name: Not on file    Number of children: Not on file    Years of education: Not on file    Highest education level: Not on file   Occupational History    Occupation: disabled     Employer: N/A   Tobacco Use    Smoking status: Former Smoker     Packs/day: 3.00     Years: 41.00     Pack years: 123.00     Types: Cigarettes     Quit date: 2000     Years since quittin.7    Smokeless tobacco: Never Used    Tobacco comment: quit in    Vaping Use    Vaping Use: Never used   Substance and Sexual Activity    Alcohol use: No     Alcohol/week: 0.0 standard drinks    Drug use: No    Sexual activity: Not Currently   Other Topics Concern    Not on file   Social History Narrative    Not on file     Social Determinants of Health     Financial Resource Strain:     Difficulty of Paying Living Expenses:    Food Insecurity:     Worried About Running Out of Food in the Last Year:     Ran Out of Food in the Last Year:    Transportation Needs:     Lack of Transportation (Medical):      Lack of Transportation (Non-Medical):    Physical Activity:     Days of Exercise per Week:     Minutes of Exercise per Session:    Stress:     Feeling of Stress :    Social Connections:     Frequency of Communication with Friends and Family:     Frequency of Social Gatherings with Friends and Family:     Attends Faith Services:     Active Member of Clubs or Organizations:     Attends Club or Organization Meetings:     Marital Status:    Intimate Partner Violence:     Fear of Current or Ex-Partner:     Emotionally Abused:     Physically Abused:     Sexually Abused:          Review of Systems:  Review of Systems   Constitutional: Negative. HENT: Negative. Eyes: Positive for visual disturbance. Cardiovascular:        Occasional chest pain   Respiratory:        Uses oxygen prn   Endocrine:        Blood sugar  120-130   Hematologic/Lymphatic: Negative. Does not bruise/bleed easily. Musculoskeletal: Positive for arthritis and back pain. Leg cramps   Gastrointestinal: Negative. Genitourinary: Positive for nocturia. Neurological: Positive for headaches and weakness. Uses a walker   Psychiatric/Behavioral: Negative. Physical Exam:  LMP  (LMP Unknown)     Physical Exam  Skin:         Neurological:      Mental Status: She is alert and oriented to person, place, and time. Psychiatric:         Mood and Affect: Mood normal.         Thought Content: Thought content normal.           Assessment:    Problem List Items Addressed This Visit     Spondylarthrosis    Sacroiliitis, not elsewhere classified (HCC) (Chronic)    Osteoarthritis of cervical spine (Chronic)    Neuropathy    Medication monitoring encounter (Chronic)    Lumbosacral spondylosis without myelopathy (Chronic)    Lumbar radiculopathy, chronic    DDD (degenerative disc disease), lumbar    DDD (degenerative disc disease), cervical - Primary (Chronic)    Chronic, continuous use of opioids    Chronic pain of left knee    Bilateral low back pain with sciatica            Treatment Plan:  DISCUSSION: Treatment options discussed withpatient and all questions answered to patient's satisfaction.      Possible side effects, risk of tolerance and or dependence and alternative treatments discussed    Obtaining appropriate analgesic effect of treatment   No signs of potential drug abuse or diversion identified    [x] Ill effects of being on chronic pain medications such as sleep disturbances, respiratory depression, hormonal changes, withdrawal symptoms, chronic opioid dependence and tolerance as well as risk of taking opioids with Benzodiazepines and taking opioids along with alcohol,  werediscussed with patient. I had asked the patient to minimize medication use and utilize pain medications only for uncontrolled rest pain or pain with exertional activities. I advised patient not to self-escalate painmedications without consulting with us. At each of patient's future visits we will try to taper pain medications, while adjusting the adjunct medications, and re-evaluating for Physical Therapy to improve spinal andjoint strength. We will continue to have discussions to decrease pain medications as tolerated. Counseled patient on effects their pain medication and /or their medical condition mayhave on their  ability to drive or operate machinery. Instructed not to drive or operate machinery if drowsy     I also discussed with the patient regarding the dangers of combining narcotic pain medication with tranquilizers, alcohol or illegal drugs or taking the medication any way other than prescribed. The dangers were discussed  including respiratory depression and death. Patient was told to tell  all  physicians regarding the medications he is getting from pain clinic. Patient is warned not to take any unprescribed medications over-the-countermedications that can depress breathing . Patient is advised to talk to the pharmacist or physicians if planning to take any over-the-counter medications before  takeing them. Patient is strongly advised to avoid tranquilizers or  relaxants, illegal drugs  or any medications that can depress breathing  Patient is also advised to tell us if there is any changes in their medications from other physicians.             TREATMENT OPTIONS:       Medication Agreement Requirements Met  Continue Opioid therapy  Script written for  percocet  Follow up appointment made

## 2021-10-01 DIAGNOSIS — E11.42 TYPE 2 DIABETES MELLITUS WITH DIABETIC POLYNEUROPATHY, WITH LONG-TERM CURRENT USE OF INSULIN (HCC): Chronic | ICD-10-CM

## 2021-10-01 DIAGNOSIS — Z79.4 TYPE 2 DIABETES MELLITUS WITH DIABETIC POLYNEUROPATHY, WITH LONG-TERM CURRENT USE OF INSULIN (HCC): Chronic | ICD-10-CM

## 2021-10-01 NOTE — TELEPHONE ENCOUNTER
Please Approve or Refuse. Send to Pharmacy per Pt's Request:      Next Visit Date:  10/19/2021   Last Visit Date: 2/25/2021    Hemoglobin A1C (%)   Date Value   02/25/2021 7.8   09/14/2020 10.6   02/12/2020 8.4             ( goal A1C is < 7)   BP Readings from Last 3 Encounters:   05/26/21 (!) 110/90   04/30/21 120/76   04/26/21 122/65          (goal 120/80)  BUN   Date Value Ref Range Status   05/26/2021 21 8 - 23 mg/dL Final     CREATININE   Date Value Ref Range Status   05/26/2021 1.06 (H) 0.50 - 0.90 mg/dL Final     Potassium   Date Value Ref Range Status   05/26/2021 5.2 3.7 - 5.3 mmol/L Final     Comment:     SPECIMEN SLIGHTLY HEMOLYZED, RESULTS MAY BE ADVERSELY AFFECTED.

## 2021-10-05 ENCOUNTER — APPOINTMENT (OUTPATIENT)
Dept: GENERAL RADIOLOGY | Age: 71
DRG: 189 | End: 2021-10-05
Payer: COMMERCIAL

## 2021-10-05 ENCOUNTER — NURSE TRIAGE (OUTPATIENT)
Dept: OTHER | Facility: CLINIC | Age: 71
End: 2021-10-05

## 2021-10-05 ENCOUNTER — HOSPITAL ENCOUNTER (INPATIENT)
Age: 71
LOS: 3 days | Discharge: NURSING, REHAB OR LONG-TERM CARE | DRG: 189 | End: 2021-10-09
Attending: EMERGENCY MEDICINE | Admitting: INTERNAL MEDICINE
Payer: COMMERCIAL

## 2021-10-05 DIAGNOSIS — M50.30 DDD (DEGENERATIVE DISC DISEASE), CERVICAL: ICD-10-CM

## 2021-10-05 DIAGNOSIS — R11.0 NAUSEA: ICD-10-CM

## 2021-10-05 DIAGNOSIS — M47.817 LUMBOSACRAL SPONDYLOSIS WITHOUT MYELOPATHY: ICD-10-CM

## 2021-10-05 DIAGNOSIS — M54.16 LUMBAR RADICULOPATHY, CHRONIC: ICD-10-CM

## 2021-10-05 DIAGNOSIS — R07.89 OTHER CHEST PAIN: Primary | ICD-10-CM

## 2021-10-05 DIAGNOSIS — M46.1 SACROILIITIS, NOT ELSEWHERE CLASSIFIED (HCC): ICD-10-CM

## 2021-10-05 LAB
ABSOLUTE EOS #: 0.27 K/UL (ref 0–0.44)
ABSOLUTE IMMATURE GRANULOCYTE: 0.03 K/UL (ref 0–0.3)
ABSOLUTE LYMPH #: 2.13 K/UL (ref 1.1–3.7)
ABSOLUTE MONO #: 0.69 K/UL (ref 0.1–1.2)
ALBUMIN SERPL-MCNC: 3.9 G/DL (ref 3.5–5.2)
ALBUMIN/GLOBULIN RATIO: 1.1 (ref 1–2.5)
ALP BLD-CCNC: 84 U/L (ref 35–104)
ALT SERPL-CCNC: 39 U/L (ref 5–33)
ANION GAP SERPL CALCULATED.3IONS-SCNC: 13 MMOL/L (ref 9–17)
AST SERPL-CCNC: 56 U/L
BASOPHILS # BLD: 1 % (ref 0–2)
BASOPHILS ABSOLUTE: 0.07 K/UL (ref 0–0.2)
BILIRUB SERPL-MCNC: 0.39 MG/DL (ref 0.3–1.2)
BNP INTERPRETATION: NORMAL
BUN BLDV-MCNC: 17 MG/DL (ref 8–23)
BUN/CREAT BLD: ABNORMAL (ref 9–20)
CALCIUM SERPL-MCNC: 9.7 MG/DL (ref 8.6–10.4)
CHLORIDE BLD-SCNC: 96 MMOL/L (ref 98–107)
CO2: 27 MMOL/L (ref 20–31)
CREAT SERPL-MCNC: 1.01 MG/DL (ref 0.5–0.9)
DIFFERENTIAL TYPE: ABNORMAL
EOSINOPHILS RELATIVE PERCENT: 4 % (ref 1–4)
GFR AFRICAN AMERICAN: >60 ML/MIN
GFR NON-AFRICAN AMERICAN: 54 ML/MIN
GFR SERPL CREATININE-BSD FRML MDRD: ABNORMAL ML/MIN/{1.73_M2}
GFR SERPL CREATININE-BSD FRML MDRD: ABNORMAL ML/MIN/{1.73_M2}
GLUCOSE BLD-MCNC: 121 MG/DL (ref 65–105)
GLUCOSE BLD-MCNC: 84 MG/DL (ref 70–99)
HCT VFR BLD CALC: 47.9 % (ref 36.3–47.1)
HEMOGLOBIN: 14.4 G/DL (ref 11.9–15.1)
IMMATURE GRANULOCYTES: 0 %
LYMPHOCYTES # BLD: 30 % (ref 24–43)
MCH RBC QN AUTO: 29.8 PG (ref 25.2–33.5)
MCHC RBC AUTO-ENTMCNC: 30.1 G/DL (ref 28.4–34.8)
MCV RBC AUTO: 99 FL (ref 82.6–102.9)
MONOCYTES # BLD: 10 % (ref 3–12)
NRBC AUTOMATED: 0 PER 100 WBC
PDW BLD-RTO: 12.7 % (ref 11.8–14.4)
PLATELET # BLD: 209 K/UL (ref 138–453)
PLATELET ESTIMATE: ABNORMAL
PMV BLD AUTO: 11.5 FL (ref 8.1–13.5)
POTASSIUM SERPL-SCNC: 4.2 MMOL/L (ref 3.7–5.3)
PRO-BNP: 118 PG/ML
RBC # BLD: 4.84 M/UL (ref 3.95–5.11)
RBC # BLD: ABNORMAL 10*6/UL
SARS-COV-2, RAPID: NOT DETECTED
SEG NEUTROPHILS: 55 % (ref 36–65)
SEGMENTED NEUTROPHILS ABSOLUTE COUNT: 4.03 K/UL (ref 1.5–8.1)
SODIUM BLD-SCNC: 136 MMOL/L (ref 135–144)
SPECIMEN DESCRIPTION: NORMAL
TOTAL PROTEIN: 7.4 G/DL (ref 6.4–8.3)
TROPONIN INTERP: NORMAL
TROPONIN T: NORMAL NG/ML
TROPONIN, HIGH SENSITIVITY: 10 NG/L (ref 0–14)
WBC # BLD: 7.2 K/UL (ref 3.5–11.3)
WBC # BLD: ABNORMAL 10*3/UL

## 2021-10-05 PROCEDURE — 6370000000 HC RX 637 (ALT 250 FOR IP): Performed by: HEALTH CARE PROVIDER

## 2021-10-05 PROCEDURE — 83880 ASSAY OF NATRIURETIC PEPTIDE: CPT

## 2021-10-05 PROCEDURE — 2580000003 HC RX 258: Performed by: HEALTH CARE PROVIDER

## 2021-10-05 PROCEDURE — 82947 ASSAY GLUCOSE BLOOD QUANT: CPT

## 2021-10-05 PROCEDURE — 93005 ELECTROCARDIOGRAM TRACING: CPT | Performed by: HEALTH CARE PROVIDER

## 2021-10-05 PROCEDURE — 84484 ASSAY OF TROPONIN QUANT: CPT

## 2021-10-05 PROCEDURE — 71046 X-RAY EXAM CHEST 2 VIEWS: CPT

## 2021-10-05 PROCEDURE — 80053 COMPREHEN METABOLIC PANEL: CPT

## 2021-10-05 PROCEDURE — 85025 COMPLETE CBC W/AUTO DIFF WBC: CPT

## 2021-10-05 PROCEDURE — 99285 EMERGENCY DEPT VISIT HI MDM: CPT

## 2021-10-05 PROCEDURE — G0378 HOSPITAL OBSERVATION PER HR: HCPCS

## 2021-10-05 PROCEDURE — 87635 SARS-COV-2 COVID-19 AMP PRB: CPT

## 2021-10-05 RX ORDER — SODIUM CHLORIDE 0.9 % (FLUSH) 0.9 %
5-40 SYRINGE (ML) INJECTION EVERY 12 HOURS SCHEDULED
Status: DISCONTINUED | OUTPATIENT
Start: 2021-10-05 | End: 2021-10-09 | Stop reason: HOSPADM

## 2021-10-05 RX ORDER — ATORVASTATIN CALCIUM 40 MG/1
40 TABLET, FILM COATED ORAL DAILY
Status: DISCONTINUED | OUTPATIENT
Start: 2021-10-06 | End: 2021-10-09 | Stop reason: HOSPADM

## 2021-10-05 RX ORDER — DEXTROSE MONOHYDRATE 50 MG/ML
100 INJECTION, SOLUTION INTRAVENOUS PRN
Status: DISCONTINUED | OUTPATIENT
Start: 2021-10-05 | End: 2021-10-09 | Stop reason: HOSPADM

## 2021-10-05 RX ORDER — PANTOPRAZOLE SODIUM 40 MG/1
40 TABLET, DELAYED RELEASE ORAL 2 TIMES DAILY
Status: DISCONTINUED | OUTPATIENT
Start: 2021-10-05 | End: 2021-10-09 | Stop reason: HOSPADM

## 2021-10-05 RX ORDER — SODIUM CHLORIDE 9 MG/ML
25 INJECTION, SOLUTION INTRAVENOUS PRN
Status: DISCONTINUED | OUTPATIENT
Start: 2021-10-05 | End: 2021-10-09 | Stop reason: HOSPADM

## 2021-10-05 RX ORDER — DEXTROSE MONOHYDRATE 25 G/50ML
12.5 INJECTION, SOLUTION INTRAVENOUS PRN
Status: DISCONTINUED | OUTPATIENT
Start: 2021-10-05 | End: 2021-10-09 | Stop reason: HOSPADM

## 2021-10-05 RX ORDER — ASPIRIN 81 MG/1
324 TABLET, CHEWABLE ORAL ONCE
Status: COMPLETED | OUTPATIENT
Start: 2021-10-05 | End: 2021-10-05

## 2021-10-05 RX ORDER — UREA 10 %
10 LOTION (ML) TOPICAL NIGHTLY
Status: DISCONTINUED | OUTPATIENT
Start: 2021-10-05 | End: 2021-10-09 | Stop reason: HOSPADM

## 2021-10-05 RX ORDER — IPRATROPIUM BROMIDE AND ALBUTEROL SULFATE 2.5; .5 MG/3ML; MG/3ML
3 SOLUTION RESPIRATORY (INHALATION) EVERY 4 HOURS
Status: DISCONTINUED | OUTPATIENT
Start: 2021-10-05 | End: 2021-10-09 | Stop reason: HOSPADM

## 2021-10-05 RX ORDER — ACETAMINOPHEN 500 MG
1000 TABLET ORAL ONCE
Status: COMPLETED | OUTPATIENT
Start: 2021-10-05 | End: 2021-10-05

## 2021-10-05 RX ORDER — CARVEDILOL 3.12 MG/1
3.12 TABLET ORAL 2 TIMES DAILY
Status: DISCONTINUED | OUTPATIENT
Start: 2021-10-05 | End: 2021-10-09 | Stop reason: HOSPADM

## 2021-10-05 RX ORDER — ONDANSETRON 4 MG/1
4 TABLET, ORALLY DISINTEGRATING ORAL EVERY 8 HOURS PRN
Status: DISCONTINUED | OUTPATIENT
Start: 2021-10-05 | End: 2021-10-09 | Stop reason: HOSPADM

## 2021-10-05 RX ORDER — LOSARTAN POTASSIUM 25 MG/1
25 TABLET ORAL DAILY
Status: DISCONTINUED | OUTPATIENT
Start: 2021-10-06 | End: 2021-10-09 | Stop reason: HOSPADM

## 2021-10-05 RX ORDER — ALBUTEROL SULFATE 2.5 MG/3ML
2.5 SOLUTION RESPIRATORY (INHALATION) EVERY 6 HOURS PRN
Status: DISCONTINUED | OUTPATIENT
Start: 2021-10-05 | End: 2021-10-09 | Stop reason: HOSPADM

## 2021-10-05 RX ORDER — ONDANSETRON 2 MG/ML
4 INJECTION INTRAMUSCULAR; INTRAVENOUS EVERY 6 HOURS PRN
Status: DISCONTINUED | OUTPATIENT
Start: 2021-10-05 | End: 2021-10-09 | Stop reason: HOSPADM

## 2021-10-05 RX ORDER — ONDANSETRON 4 MG/1
4 TABLET, ORALLY DISINTEGRATING ORAL ONCE
Status: COMPLETED | OUTPATIENT
Start: 2021-10-05 | End: 2021-10-05

## 2021-10-05 RX ORDER — SODIUM CHLORIDE 0.9 % (FLUSH) 0.9 %
5-40 SYRINGE (ML) INJECTION PRN
Status: DISCONTINUED | OUTPATIENT
Start: 2021-10-05 | End: 2021-10-09 | Stop reason: HOSPADM

## 2021-10-05 RX ORDER — TOPIRAMATE 100 MG/1
100 TABLET, FILM COATED ORAL NIGHTLY
Status: DISCONTINUED | OUTPATIENT
Start: 2021-10-05 | End: 2021-10-09 | Stop reason: HOSPADM

## 2021-10-05 RX ORDER — ACETAMINOPHEN 500 MG
1000 TABLET ORAL EVERY 8 HOURS PRN
Status: DISCONTINUED | OUTPATIENT
Start: 2021-10-05 | End: 2021-10-09 | Stop reason: HOSPADM

## 2021-10-05 RX ORDER — OXYCODONE HYDROCHLORIDE AND ACETAMINOPHEN 5; 325 MG/1; MG/1
1 TABLET ORAL 3 TIMES DAILY PRN
Status: DISCONTINUED | OUTPATIENT
Start: 2021-10-05 | End: 2021-10-09 | Stop reason: HOSPADM

## 2021-10-05 RX ORDER — NICOTINE POLACRILEX 4 MG
15 LOZENGE BUCCAL PRN
Status: DISCONTINUED | OUTPATIENT
Start: 2021-10-05 | End: 2021-10-09 | Stop reason: HOSPADM

## 2021-10-05 RX ORDER — DICYCLOMINE HYDROCHLORIDE 10 MG/1
10 CAPSULE ORAL 2 TIMES DAILY PRN
Status: DISCONTINUED | OUTPATIENT
Start: 2021-10-05 | End: 2021-10-09 | Stop reason: HOSPADM

## 2021-10-05 RX ORDER — OXYCODONE HYDROCHLORIDE 5 MG/1
5 TABLET ORAL ONCE
Status: COMPLETED | OUTPATIENT
Start: 2021-10-05 | End: 2021-10-05

## 2021-10-05 RX ORDER — GABAPENTIN 300 MG/1
300 CAPSULE ORAL 2 TIMES DAILY
Status: DISCONTINUED | OUTPATIENT
Start: 2021-10-05 | End: 2021-10-09 | Stop reason: HOSPADM

## 2021-10-05 RX ORDER — INSULIN GLARGINE 100 [IU]/ML
50 INJECTION, SOLUTION SUBCUTANEOUS 2 TIMES DAILY
Status: DISCONTINUED | OUTPATIENT
Start: 2021-10-05 | End: 2021-10-09 | Stop reason: HOSPADM

## 2021-10-05 RX ORDER — CITALOPRAM 20 MG/1
20 TABLET ORAL 2 TIMES DAILY
Status: DISCONTINUED | OUTPATIENT
Start: 2021-10-05 | End: 2021-10-09 | Stop reason: HOSPADM

## 2021-10-05 RX ORDER — BUDESONIDE AND FORMOTEROL FUMARATE DIHYDRATE 160; 4.5 UG/1; UG/1
2 AEROSOL RESPIRATORY (INHALATION) DAILY
Status: DISCONTINUED | OUTPATIENT
Start: 2021-10-06 | End: 2021-10-09 | Stop reason: HOSPADM

## 2021-10-05 RX ADMIN — Medication 10 MG: at 23:35

## 2021-10-05 RX ADMIN — PANTOPRAZOLE SODIUM 40 MG: 40 TABLET, DELAYED RELEASE ORAL at 23:35

## 2021-10-05 RX ADMIN — OXYCODONE 5 MG: 5 TABLET ORAL at 23:39

## 2021-10-05 RX ADMIN — SODIUM CHLORIDE, PRESERVATIVE FREE 10 ML: 5 INJECTION INTRAVENOUS at 23:40

## 2021-10-05 RX ADMIN — GABAPENTIN 300 MG: 300 CAPSULE ORAL at 23:35

## 2021-10-05 RX ADMIN — CITALOPRAM 20 MG: 20 TABLET, FILM COATED ORAL at 23:36

## 2021-10-05 RX ADMIN — ONDANSETRON 4 MG: 4 TABLET, ORALLY DISINTEGRATING ORAL at 19:42

## 2021-10-05 RX ADMIN — MAGNESIUM GLUCONATE 500 MG ORAL TABLET 400 MG: 500 TABLET ORAL at 23:35

## 2021-10-05 RX ADMIN — ASPIRIN 324 MG: 81 TABLET, CHEWABLE ORAL at 20:22

## 2021-10-05 RX ADMIN — CARVEDILOL 3.12 MG: 3.12 TABLET, FILM COATED ORAL at 23:35

## 2021-10-05 RX ADMIN — ONDANSETRON 4 MG: 4 TABLET, ORALLY DISINTEGRATING ORAL at 18:51

## 2021-10-05 RX ADMIN — ACETAMINOPHEN 1000 MG: 500 TABLET ORAL at 21:35

## 2021-10-05 RX ADMIN — TOPIRAMATE 100 MG: 100 TABLET, FILM COATED ORAL at 23:35

## 2021-10-05 ASSESSMENT — PAIN DESCRIPTION - ORIENTATION
ORIENTATION: MID
ORIENTATION: LEFT

## 2021-10-05 ASSESSMENT — PAIN DESCRIPTION - PAIN TYPE
TYPE: ACUTE PAIN
TYPE: ACUTE PAIN

## 2021-10-05 ASSESSMENT — PAIN SCALES - GENERAL
PAINLEVEL_OUTOF10: 9
PAINLEVEL_OUTOF10: 7

## 2021-10-05 ASSESSMENT — ENCOUNTER SYMPTOMS
NAUSEA: 1
COLOR CHANGE: 0
FACIAL SWELLING: 0
ABDOMINAL DISTENTION: 0
WHEEZING: 1
SORE THROAT: 0
CHEST TIGHTNESS: 0
VOMITING: 0
ABDOMINAL PAIN: 0
BACK PAIN: 1
SHORTNESS OF BREATH: 0
COUGH: 1
DIARRHEA: 0

## 2021-10-05 ASSESSMENT — PAIN DESCRIPTION - LOCATION
LOCATION: HEAD
LOCATION: CHEST

## 2021-10-05 NOTE — TELEPHONE ENCOUNTER
Received call from [address] [facility]. Brief description of triage: Patient calling for concerns for sleeping a lot today and stated she has been experiencing more chest pain. Triage indicates for patient to go to ED now. Patient stated that she pushes a button to call 911 EMS. Offered to stay on call until EMS arrives. Patient stated that she would call someone to come stay with her until they arrived. Care advice provided, patient verbalizes understanding; denies any other questions or concerns; instructed to call back for any new or worsening symptoms. Attention Provider: Thank you for allowing me to participate in the care of your patient. The patient was connected to triage in response to information provided to the ECC/PSC. Please do not respond through this encounter as the response is not directed to a shared pool. Reason for Disposition   [1] Chest pain (or \"angina\") comes and goes AND [2] is happening more often (increasing in frequency) or getting worse (increasing in severity)    Answer Assessment - Initial Assessment Questions  1. LOCATION: \"Where does it hurt? \"        Left side    2. RADIATION: \"Does the pain go anywhere else? \" (e.g., into neck, jaw, arms, back)      Into her left arm    3. ONSET: \"When did the chest pain begin? \" (Minutes, hours or days)       Over the past couple days    4. PATTERN \"Does the pain come and go, or has it been constant since it started? \"  \"Does it get worse with exertion? \"       Comes and goes    5. DURATION: \"How long does it last\" (e.g., seconds, minutes, hours)      Last 20 seconds    6. SEVERITY: \"How bad is the pain? \"  (e.g., Scale 1-10; mild, moderate, or severe)     - MILD (1-3): doesn't interfere with normal activities      - MODERATE (4-7): interferes with normal activities or awakens from sleep     - SEVERE (8-10): excruciating pain, unable to do any normal activities        No pain right now    7. CARDIAC RISK FACTORS: \"Do you have any history of heart problems or risk factors for heart disease? \" (e.g., angina, prior heart attack; diabetes, high blood pressure, high cholesterol, smoker, or strong family history of heart disease)      Six mini strokes, cardiac arrest    8. PULMONARY RISK FACTORS: \"Do you have any history of lung disease? \"  (e.g., blood clots in lung, asthma, emphysema, birth control pills)      Sleep apnea:  CPAP, COPD, Emphysema, Asthma    9. CAUSE: \"What do you think is causing the chest pain? \"      Unsure    10. OTHER SYMPTOMS: \"Do you have any other symptoms? \" (e.g., dizziness, nausea, vomiting, sweating, fever, difficulty breathing, cough)        Fatigue, sleeping a lot for past two days, a little bit of dizziness, no nausea or vomiting, no sweating, no fever, shortness of breath at baseline with COPD:  No changes or worsening, no cough    11. PREGNANCY: \"Is there any chance you are pregnant? \" \"When was your last menstrual period? \"        n/a    Protocols used: CHEST PAIN-ADULT-AH

## 2021-10-05 NOTE — ED PROVIDER NOTES
[facility]  Emergency Department Encounter  EmergencyMedicine Resident     Pt Name:Mariangel Alston  MRN: [id number]  Armsgalegfceline 1950  Date of evaluation: 10/5/21  PCP:  James Bella MD    This patient was evaluated in the Emergency Department for symptoms described in the history of present illness. The patient was evaluated in the context of the global COVID-19 pandemic, which necessitated consideration that the patient might be at risk for infection with the SARS-CoV-2 virus that causes COVID-19. Institutional protocols and algorithms that pertain to the evaluation of patients at risk for COVID-19 are in a state of rapid change based on information released by regulatory bodies including the CDC and federal and state organizations. These policies and algorithms were followed during the patient's care in the ED. CHIEF COMPLAINT       Chief Complaint   Patient presents with    Chest Pain     x1 wk       HISTORY OF PRESENT ILLNESS  (Location/Symptom, Timing/Onset, Context/Setting, Quality, Duration, Modifying Factors, Severity.)      Marianne Barthel is a  69 y.o. female with extensive past medical history including diabetes, CHF, COPD, CKD, and TIA on home oxygen who presents to the ED for 3 days of intermittent \"electrical\" shooting pain in her left chest that radiates to her back under a scapula. It lasts for approx 20 seconds and resolves to 0. Occurred a few times in the past 2 days, twice today, the 2nd time greater in intensity and causing pain into her left arm, which is what brought her to the department to be evaluated. Describes her left arm as being painful and weak during the episode, resolved but now tender in the deltoid region. No increase in cough in the last several weeks, is on her baseline oxygen therapy of 2 L intermittent during the day and meeting her goal of 87 to 92%.   She intermittently will get swelling in her bilateral lower extremities but not in the last few days. Completed a nuc med stress test in April of this year for chest pain, with LVEF >70% and stratified as low risk. She was seen in the ED in April for similar presentation, workup at that was unremarkable. Has a family med appt scheduled for 19 Oct, PCP Jimmy Willis is [facility] associate on our current listing. PAST MEDICAL / SURGICAL / SOCIAL / FAMILY HISTORY      has a past medical history of Abdominal bloating, Adenomatous polyp of ascending colon, Allergic rhinitis, Anxiety, Arthritis, Asthma, Atrial fibrillation (HCC), Back pain, Benign hypertension with CKD (chronic kidney disease) stage III ([address] CAD (coronary artery disease), Caffeine use, CHF (congestive heart failure) ([address] Chronic kidney disease, CKD (chronic kidney disease) stage 3, GFR 30-59 ml/min ([facility]), COPD (chronic obstructive pulmonary disease) ([address] Degeneration of lumbar or lumbosacral intervertebral disc, Depression, DM (diabetes mellitus) ([address] Emphysema of lung ([address] Gastritis, GERD (gastroesophageal reflux disease), Hearing loss, Hematuria, Hiatal hernia, HTN (hypertension), benign, Hypertriglyceridemia, Incontinence of urine, Irritable bowel syndrome with both constipation and diarrhea, Knee arthropathy, Lumbosacral spondylosis without myelopathy, Migraine headache, Mumps, Neuropathy, Obesity, On home oxygen therapy, HIGINIO on CPAP, Otitis media, Secondary diabetes mellitus with stage 3 chronic kidney disease (GFR 30-59) ([address] Stroke ([address] Tinnitus, Type 2 diabetes mellitus without complication ([address] Type II or unspecified type diabetes mellitus without mention of complication, not stated as uncontrolled, UTI (lower urinary tract infection), and Varicose vein. has a past surgical history that includes Cholecystectomy (2007); Carpal tunnel release (Right); Tympanoplasty; Dilation & curettage (1979); Cystocopy (9/25/2013); Cataract removal with implant (Bilateral); Tonsillectomy;  Incontinence surgery; ablation of dysrhythmic focus (); Upper gastrointestinal endoscopy (2016); eye surgery; Tubal ligation; other surgical history (09/10/15); Insertable Cardiac Monitor (2015); Tympanoplasty; Colonoscopy; Colonoscopy (04/10/2017); pr colsc flx w/removal lesion by hot bx forceps (N/A, 4/10/2017); Tympanostomy tube placement (2017); Upper gastrointestinal endoscopy (N/A, 3/2/2021); and Colonoscopy (N/A, 3/2/2021). Social History     Socioeconomic History    Marital status: Legally      Spouse name: Not on file    Number of children: Not on file    Years of education: Not on file    Highest education level: Not on file   Occupational History    Occupation: disabled     Employer: N/A   Tobacco Use    Smoking status: Former Smoker     Packs/day: 3.00     Years: 41.00     Pack years: 123.00     Types: Cigarettes     Quit date: 2000     Years since quittin.7    Smokeless tobacco: Never Used    Tobacco comment: quit in    Vaping Use    Vaping Use: Never used   Substance and Sexual Activity    Alcohol use: No     Alcohol/week: 0.0 standard drinks    Drug use: No    Sexual activity: Not Currently   Other Topics Concern    Not on file   Social History Narrative    Not on file     Social Determinants of Health     Financial Resource Strain:     Difficulty of Paying Living Expenses:    Food Insecurity:     Worried About [address] in the Last Year:     [address] in the Last Year:    Transportation Needs:     Lack of Transportation (Medical):      Lack of Transportation (Non-Medical):    Physical Activity:     Days of Exercise per Week:     Minutes of Exercise per Session:    Stress:     Feeling of Stress :    Social Connections:     Frequency of Communication with Friends and Family:     Frequency of Social Gatherings with Friends and Family:     Attends [employer] Services:     Active Member of Clubs or Organizations:     Attends Club or Organization Meetings:     Marital Status:    Intimate Partner Violence:     Fear of Current or Ex-Partner:     Emotionally Abused:     Physically Abused:     Sexually Abused:        Family History   Problem Relation Age of Onset    Diabetes Mother     Heart Disease Mother     Stomach Cancer Father     Diabetes Maternal Grandmother         also aunts and uncles (maternal)    Emphysema Sister        Allergies:  Robitussin [guaifenesin], Codeine, Compazine [prochlorperazine maleate], Iodides, Morphine, Moxifloxacin, Reglan [metoclopramide], Avelox [moxifloxacin hcl in nacl], Cipro xr, and Sulfa antibiotics    Home Medications:  Prior to Admission medications    Medication Sig Start Date End Date Taking? Authorizing Provider   metFORMIN (GLUCOPHAGE) 1000 MG tablet TAKE 1 TAB BY MOUTH TWICE A DAY ( IN THE MORNING AND BEDTIME )  10/1/21  Yes Yariel Fletcher MD   oxyCODONE-acetaminophen (PERCOCET) 5-325 MG per tablet Take 1 tablet by mouth See Admin Instructions for 30 days. Intended supply: 30 days.  1 tab 3-4 times a day prn 10/1/21 10/31/21 Yes VERONIKA Blake - CNP   insulin glargine (LANTUS SOLOSTAR) 100 UNIT/ML injection pen Inject 42 Units into the skin nightly  Patient taking differently: Inject 50 Units into the skin 2 times daily  9/27/21  Yes Yariel Fletcher MD   blood glucose test strips (ONETOUCH ULTRA) strip TEST TWO (2) TO THREE (3) TIMES A DAY & AS NEEDED FOR SYMPTOMS OF IRREGULAR BLOOD GLUCOSE 8/23/21  Yes Yariel Fletcher MD   blood glucose test strips (ONETOUCH ULTRA) strip TEST TWO (2) TO THREE (3) TIMES A DAY & AS NEEDED FOR SYMPTOMS OF IRREGULAR BLOOD GLUCOSE 8/20/21  Yes Yariel Fletcher MD   ASPIRIN LOW DOSE 81 MG EC tablet TAKE 1 TAB BY MOUTH ONCE A DAY  4/23/21  Yes Yariel Fletcher MD   Blood Glucose Monitoring Suppl (ONE TOUCH ULTRA 2) w/Device KIT  2/10/21  Yes Historical Provider, MD   ONETOUCH ULTRA strip TEST TWO (2) TO THREE (3) TIMES A DAY& AS NEEDED  3/5/21  Yes Yariel Fletcher MD insulin aspart (NOVOLOG FLEXPEN) 100 UNIT/ML injection pen IF <139 - NO INSULIN; 140-199-2 UNITS;200-249-4 UNITS;250-299-6 UNITS;300-349-8 UNITS;350-400=10 UNITS;ABOVE 400-12 UNITS 3/3/21  Yes Cranford Riedel, MD   atorvastatin (LIPITOR) 40 MG tablet Take 1 tablet by mouth daily 2/28/21  Yes Cranford Riedel, MD   Multiple Vitamins-Minerals (THERAPEUTIC MULTIVITAMIN-MINERALS) tablet Take 1 tablet by mouth daily Takes Women over 50 Complete Vitamin daily ([employer] brand)   Yes Historical Provider, MD   BiPAP Machine MISC repair/replace Bipap maintain 18/9CMH2O, Cflex=3,mask,tubing,filters,headgear,heated humidifier,all supplies PRN 1/28/21  Yes Historical Provider, MD   Psyllium (METAMUCIL PO) Take by mouth 3 times daily Takes powder   Yes Historical Provider, MD   blood glucose test strips (TRUE METRIX BLOOD GLUCOSE TEST) strip THREE TIMES A DAY 1/21/21  Yes Cranford Riedel, MD   ferrous sulfate (IRON 325) 325 (65 Fe) MG tablet TAKE 1 TAB BY MOUTH EVERY MORNING  1/15/21  Yes Cranford Riedel, MD   citalopram (CELEXA) 40 MG tablet TAKE 1 2 TABLET BY MOUTH TWICE A DAY  Patient taking differently: Take 20 mg by mouth 2 times daily  1/15/21  Yes Cranford Riedel, MD   carvedilol (COREG) 3.125 MG tablet TAKE 1 TAB BY MOUTH TWICE A DAY ( IN THE MORNING AND BEDTIME )  1/15/21  Yes Cranford Riedel, MD   losartan (COZAAR) 25 MG tablet TAKE 1 TAB BY MOUTH ONCE A DAY ( IN THE MORNING ) 1/15/21  Yes Cranford Riedel, MD   magnesium oxide (MAG-OX) 400 MG tablet TAKE 1 TAB BY MOUTH TWICE A DAY ( IN THE MORNING AND BEDTIME ) 1/15/21  Yes Cranford Riedel, MD   topiramate (TOPAMAX) 100 MG tablet Take 1 tablet by mouth nightly 11/30/20  Yes Lazaro Mendes MD   dicyclomine (BENTYL) 10 MG capsule Take 1 capsule by mouth 2 times daily as needed (abdominal spasm) 11/25/20  Yes Cranford Riedel, MD   albuterol (PROVENTIL) (2.5 MG/3ML) 0.083% nebulizer solution Take 3 mLs by nebulization every 6 hours as needed for Wheezing 11/3/20  Yes Mayelin Baptiste MD Icosapent Ethyl (VASCEPA) 1 g CAPS capsule Take 1 capsule by mouth 2 times daily 9/14/20  Yes Margarita Vences MD   vitamin B-12 (CYANOCOBALAMIN) 100 MCG tablet Take 50 mcg by mouth daily   Yes Historical Provider, MD MONTERORE MINI PEN NEEDLES 31G X 6 MM MISC USE AS DIRECTED  8/14/20  Yes Margarita Vences MD   gabapentin (NEURONTIN) 300 MG capsule Take 1 capsule by mouth 3 times daily for 30 days. Patient taking differently: Take 300 mg by mouth 2 times daily. 7/30/20 10/5/21 Yes VERONIKA Murillo - CNP   Lift Chair MISC by Does not apply route 9/24/19  Yes Margarita Vences MD   Misc.  Devices (ADJUST BATH/SHOWER SEAT/BACK) MISC Use daily for shower 9/24/19  Yes Margarita Vences MD   pantoprazole (PROTONIX) 40 MG tablet Take 1 tablet by mouth 2 times daily 3/15/19  Yes Hazel Salomón, DO   Alcohol Swabs (B-D SINGLE USE SWABS REGULAR) PADS THREE TIMES A DAY 12/12/18  Yes Margarita Vneces MD   nitroGLYCERIN (NITROSTAT) 0.4 MG SL tablet 1 under the tongue as needed for angina, may repeat q5mins for up three doses 8/28/18  Yes Historical Provider, MD   Blood Glucose Monitoring Suppl HARMEET Use daily to check BS 3/27/18  Yes Margarita Vences MD   ipratropium-albuterol (DUONEB) 0.5-2.5 (3) MG/3ML SOLN nebulizer solution Inhale 3 mLs into the lungs every 4 hours 2/6/18  Yes Indigo Abreu MD   Melatonin 10 MG TABS Take 10 mg by mouth nightly 10/19/17  Yes Margarita Vences MD   docusate sodium (COLACE) 100 MG capsule Take 1 capsule by mouth 2 times daily Please use while taking Percocet 9/10/15  Yes Ghassan Ornelas MD   SYMBICORT 160-4.5 MCG/ACT AERO   2 puffs As needed for sob 5/28/15  Yes Historical Provider, MD   OXYGEN Inhale 3 L into the lungs    Yes Historical Provider, MD   ibuprofen (ADVIL;MOTRIN) 800 MG tablet Take 1 tablet by mouth every 8 hours as needed for Pain 4/30/21 5/26/21  Mariel Somers MD   clopidogrel (PLAVIX) 75 MG tablet TAKE 1 TAB BY MOUTH ONCE A DAY ( IN THE MORNING ) -THIS MEDICINE MAY BE TAKENWITH OR WITHOUT FOOD  4/14/21   James Bella MD   furosemide (LASIX) 20 MG tablet TAKE 1 TAB BY MOUTH ONCE A DAY AS NEEDED ( WEIGHT GAIN 3 LBS OVERNIGHT )  10/31/20   James Bella MD   isosorbide dinitrate (ISORDIL) 30 MG tablet Take 30 mg by mouth    Historical Provider, MD   nystatin (MYCOSTATIN) 671518 UNIT/GM powder Apply 3 times daily. 6/25/20   James Bella MD   lidocaine (LMX) 4 % cream Apply topically every 8 hrs as needed for pain 11/11/19   James Bella MD   Compression Stockings MISC by Does not apply route Pressure between 20 - 25 . 9/11/17   James Bella MD       REVIEW OF SYSTEMS    (2-9 systems for level 4, 10 or more for level 5)      Review of Systems   Constitutional: Positive for fatigue. Negative for activity change, appetite change, chills, fever and unexpected weight change. HENT: Negative for congestion, facial swelling and sore throat. Respiratory: Positive for cough and wheezing. Negative for chest tightness and shortness of breath. Baseline cough and wheeze, not worse this week   Cardiovascular: Positive for chest pain. Negative for leg swelling. Gastrointestinal: Positive for nausea. Negative for abdominal distention, abdominal pain, diarrhea and vomiting. Genitourinary: Negative for dysuria. Musculoskeletal: Positive for back pain and myalgias. Chest pain radiates to left back just under scapula. Left shoulder/arm felt painful then weak, now resolved but tender in delt   Skin: Negative for color change and rash. Neurological: Negative for dizziness, numbness and headaches. Psychiatric/Behavioral: Positive for sleep disturbance. Negative for confusion. Has been sleeping in her lift chair (had to get rid of bed and couch due to bed bugs last month). Has been sleeping more in the last few days.         PHYSICAL EXAM   (up to 7 for level 4, 8 or more for level 5)      INITIAL VITALS:   /71   Pulse 74   Temp 97 °F (36.1 °C)   Resp 19   Ht 5' 2\" (1.575 m)   Wt 250 lb (113.4 kg)   LMP  (LMP Unknown)   SpO2 95%   BMI 45.73 kg/m²     Physical Exam  Vitals reviewed. Constitutional:       General: She is not in acute distress. Appearance: She is obese. She is not diaphoretic. HENT:      Head: Normocephalic and atraumatic. Mouth/Throat:      Mouth: Mucous membranes are moist.      Pharynx: Oropharynx is clear. Eyes:      Extraocular Movements: Extraocular movements intact. Pupils: Pupils are equal, round, and reactive to light. Cardiovascular:      Rate and Rhythm: Normal rate and regular rhythm. Pulses: Normal pulses. Heart sounds: Normal heart sounds. Pulmonary:      Effort: Pulmonary effort is normal.      Breath sounds: Wheezing present. Abdominal:      General: Bowel sounds are normal.      Tenderness: There is no abdominal tenderness. Musculoskeletal:         General: Tenderness present. Normal range of motion. Cervical back: Normal range of motion. Comments: Tender on the left delt, left chest wall near axilla, and over scap   Skin:     General: Skin is warm and dry. Neurological:      General: No focal deficit present. Mental Status: She is alert and oriented to person, place, and time. Cranial Nerves: No cranial nerve deficit. Sensory: No sensory deficit. Motor: No weakness. Psychiatric:         Mood and Affect: Mood normal.         Behavior: Behavior normal.         Thought Content: Thought content normal.         Judgment: Judgment normal.       INITIAL IMPRESSION / DIFFERENTIAL  DIAGNOSIS / PLAN     INITIAL IMPRESSION / DDX:   69 yof with multiple co-morbidities presents with atypical chest pain and nausea, likely MSK, but given history and risk factors will eval with basic labs, trop, bnp. CXR but without worsening resp symptoms, low concern for PE at this time. COPD and CHF symptoms stable.   Likely admit as she lives alone, obs vs med pending test results. EMERGENCY DEPARTMENT COURSE:  ED Course as of Oct 05 2319   Tue Oct 05, 2021   9899 April of 2021, nucmed test deemed as low risk, EF 70%    [SM]   1939 Pro-BNP: 118 [SM]   1939 Troponin, High Sensitivity: 10 [SM]      ED Course User Index  [SM] Denilson Natarajan MD       PLAN (LABS / Alverta Aung / EKG):  Orders Placed This Encounter   Procedures    COVID-19, Rapid    XR CHEST (2 VW)    CBC Auto Differential    Comprehensive Metabolic Panel w/ Reflex to MG    Troponin    Brain Natriuretic Peptide    ADULT DIET; Regular    Telemetry monitoring - continuous duration    Vital signs per unit routine    Notify physician    Notify physician    Up as tolerated    HYPOGLYCEMIA TREATMENT: blood glucose less than 50 mg/dL and patient  ALERT and TOLERATING PO    HYPOGLYCEMIA TREATMENT: blood glucose less than 70 mg/dL and patient ALERT and TOLERATING PO    HYPOGLYCEMIA TREATMENT: blood glucose less than 70 mg/dL and patient NOT ALERT or NPO    Full Code    Inpatient consult to Respiratory Care    BIPAP    POCT Glucose    EKG 12 Lead    Saline lock IV    PATIENT STATUS (FROM ED OR OR/PROCEDURAL) Observation       MEDICATIONS ORDERED:  Orders Placed This Encounter   Medications    ondansetron (ZOFRAN-ODT) disintegrating tablet 4 mg    ondansetron (ZOFRAN-ODT) disintegrating tablet 4 mg    aspirin chewable tablet 324 mg    acetaminophen (TYLENOL) tablet 1,000 mg    sodium chloride flush 0.9 % injection 5-40 mL    sodium chloride flush 0.9 % injection 5-40 mL    0.9 % sodium chloride infusion    enoxaparin (LOVENOX) injection 40 mg    OR Linked Order Group     ondansetron (ZOFRAN-ODT) disintegrating tablet 4 mg     ondansetron (ZOFRAN) injection 4 mg    acetaminophen (TYLENOL) tablet 1,000 mg    albuterol (PROVENTIL) nebulizer solution 2.5 mg     Order Specific Question:   Initiate RT Bronchodilator Protocol     Answer:    Yes    atorvastatin (LIPITOR) tablet 40 mg    carvedilol (COREG) tablet 3.125 mg    citalopram (CELEXA) tablet 20 mg    dicyclomine (BENTYL) capsule 10 mg    gabapentin (NEURONTIN) capsule 300 mg    insulin glargine (LANTUS) injection vial 50 Units    ipratropium-albuterol (DUONEB) nebulizer solution 3 mL     Order Specific Question:   Initiate RT Bronchodilator Protocol     Answer: Yes    losartan (COZAAR) tablet 25 mg    magnesium oxide (MAG-OX) tablet 400 mg    melatonin tablet 10 mg    oxyCODONE-acetaminophen (PERCOCET) 5-325 MG per tablet 1 tablet    pantoprazole (PROTONIX) tablet 40 mg    budesonide-formoterol (SYMBICORT) 160-4.5 MCG/ACT inhaler 2 puff    topiramate (TOPAMAX) tablet 100 mg    glucose (GLUTOSE) 40 % oral gel 15 g    dextrose 50 % IV solution    glucagon (rDNA) injection 1 mg    dextrose 5 % solution    insulin lispro (HUMALOG) injection vial 0-12 Units    insulin lispro (HUMALOG) injection vial 0-6 Units    oxyCODONE (ROXICODONE) immediate release tablet 5 mg    influenza quadrivalent split vaccine (FLUZONE;FLUARIX;FLULAVAL;AFLURIA) injection 0.5 mL       DIAGNOSTIC RESULTS / PROCEDURES / CONSULTS   LAB RESULTS:  Results for orders placed or performed during the hospital encounter of 10/05/21   COVID-19, Rapid    Specimen: Nasopharyngeal Swab   Result Value Ref Range    Specimen Description . NASOPHARYNGEAL SWAB     SARS-CoV-2, Rapid Not Detected Not Detected   CBC Auto Differential   Result Value Ref Range    WBC 7.2 3.5 - 11.3 k/uL    RBC 4.84 3.95 - 5.11 m/uL    Hemoglobin 14.4 11.9 - 15.1 g/dL    Hematocrit 47.9 (H) 36.3 - 47.1 %    MCV 99.0 82.6 - 102.9 fL    MCH 29.8 25.2 - 33.5 pg    MCHC 30.1 28.4 - 34.8 g/dL    RDW 12.7 11.8 - 14.4 %    Platelets 323 953 - 004 k/uL    MPV 11.5 8.1 - 13.5 fL    NRBC Automated 0.0 0.0 per 100 WBC    Differential Type NOT REPORTED     Seg Neutrophils 55 36 - 65 %    Lymphocytes 30 24 - 43 %    Monocytes 10 3 - 12 %    Eosinophils % 4 1 - 4 %    Basophils 1 0 - 2 %    Immature Granulocytes 0 0 % acute cardiopulmonary disease     EKG:  EKG: Rate 70, normal sinus rhythm, normal rhythm, flattening of T waves, left axis deviation, unchanged from previous tracings. All EKG's are interpreted by the Emergency Department Physician who either signs or Co-signs this chart in the absence of a cardiologist.    PROCEDURES:  Procedures     CONSULTS:  IP CONSULT TO RESPIRATORY CARE      FINAL IMPRESSION      1. Other chest pain    2. Nausea          DISPOSITION / PLAN     DISPOSITION    Admit to Obs for overnight monitoring and re-eval in am    PATIENT REFERRED TO:  No follow-up provider specified.     DISCHARGE MEDICATIONS:  Current Discharge Medication List          Sully Arzola MD  Emergency Medicine Resident    (Please note that portions of thisnote were completed with a voice recognition program.  Efforts were made to edit the dictations but occasionally words are mis-transcribed.)       Loyd Bellamy MD  Resident  10/05/21 9624

## 2021-10-05 NOTE — ED PROVIDER NOTES
[facility] ED                                      Emergency Department                                      Faculty Attestation                                         I performed a history and physical examination of the patient and discussed management with the resident. I reviewed the residents note and agree with the documented findings and plan of care. Any areas of disagreement are noted on the chart. I was personally present for the key portions of any procedures. I have documented in the chart those procedures where I was not present during the key portions. I agree with the chief complaint, past medical history, past surgical history, allergies, medications, social and family history as documented unless otherwise noted below. For mid-level providers such as nurse practitioners as well as physicians assistants:    I have personally seen and evaluated the patient. I find the patient's history and physical exam are consistent with NP/PA documentation. I agree with the care provided, treatment rendered, disposition, & follow-up plan. Additional findings are as noted  Electrical shock type chest pain to her left chest over the last 2 days. Shocking lasts for 20 seconds, the residual pain last for minutes after that. She has never felt anything like this before. Patient states she's been sleeping a lot, and in a lift chair, much more than usual.    Vital signs are acceptable, patient is on oxygen at home, is 97% on 3 L here. Heart is regular rate and rhythm. Lungs mild end expiratory wheeze bilaterally with adequate air entry. Abdomen is soft, nontender, no masses,, guarding, rebound, or peritoneal signs. Lower extremities are symmetrical bilaterally, without pitting edema.     EKG per my interpretation:  Normal sinus rhythm  Ventricular 70 bpm  Left axis  Normal intervals  T wave flattening through the precordial leads  No acute ST or T wave changes appreciated.      Manisha Zelaya DO  10/05/21 0553

## 2021-10-05 NOTE — ED TRIAGE NOTES
Pt arrives with CC L sided CP x1 wk intermittently. Pt also with concerns for increased sleeping.  Per pt last time the sleepiness happened all she remembers is waking up in the ER and then again in the Cardiac ICU

## 2021-10-05 NOTE — ED NOTES
Bed: 11  Expected date:   Expected time:   Means of arrival:   Comments:  [facility] [address], RN  10/05/21 9907

## 2021-10-06 ENCOUNTER — APPOINTMENT (OUTPATIENT)
Dept: GENERAL RADIOLOGY | Age: 71
DRG: 189 | End: 2021-10-06
Payer: COMMERCIAL

## 2021-10-06 PROBLEM — I95.9 HYPOTENSION: Status: ACTIVE | Noted: 2021-10-06

## 2021-10-06 PROBLEM — E66.813 OBESITY, CLASS III, BMI 40-49.9 (MORBID OBESITY) (HCC): Status: ACTIVE | Noted: 2018-07-16

## 2021-10-06 PROBLEM — J96.02 ACUTE RESPIRATORY FAILURE WITH HYPOXIA AND HYPERCARBIA (HCC): Status: ACTIVE | Noted: 2021-10-06

## 2021-10-06 PROBLEM — R09.02 HYPOXIA: Status: ACTIVE | Noted: 2021-10-06

## 2021-10-06 PROBLEM — J96.01 ACUTE RESPIRATORY FAILURE WITH HYPOXIA AND HYPERCARBIA (HCC): Status: ACTIVE | Noted: 2021-10-06

## 2021-10-06 PROBLEM — I51.9 LEFT VENTRICULAR DIASTOLIC DYSFUNCTION: Status: ACTIVE | Noted: 2021-10-06

## 2021-10-06 PROBLEM — R74.01 TRANSAMINASEMIA: Status: ACTIVE | Noted: 2021-10-06

## 2021-10-06 LAB
-: NORMAL
ABSOLUTE EOS #: 0.27 K/UL (ref 0–0.44)
ABSOLUTE IMMATURE GRANULOCYTE: 0.15 K/UL (ref 0–0.3)
ABSOLUTE LYMPH #: 1.79 K/UL (ref 1.1–3.7)
ABSOLUTE MONO #: 0.82 K/UL (ref 0.1–1.2)
ALBUMIN SERPL-MCNC: 3.6 G/DL (ref 3.5–5.2)
ALBUMIN/GLOBULIN RATIO: 1.1 (ref 1–2.5)
ALLEN TEST: ABNORMAL
ALLEN TEST: ABNORMAL
ALLEN TEST: NORMAL
ALP BLD-CCNC: 80 U/L (ref 35–104)
ALT SERPL-CCNC: 34 U/L (ref 5–33)
ANION GAP SERPL CALCULATED.3IONS-SCNC: 11 MMOL/L (ref 9–17)
AST SERPL-CCNC: 48 U/L
BASOPHILS # BLD: 1 % (ref 0–2)
BASOPHILS ABSOLUTE: 0.08 K/UL (ref 0–0.2)
BILIRUB SERPL-MCNC: 0.27 MG/DL (ref 0.3–1.2)
BNP INTERPRETATION: NORMAL
BUN BLDV-MCNC: 22 MG/DL (ref 8–23)
BUN/CREAT BLD: ABNORMAL (ref 9–20)
CALCIUM SERPL-MCNC: 9.5 MG/DL (ref 8.6–10.4)
CARBOXYHEMOGLOBIN: 0.9 % (ref 0–5)
CARBOXYHEMOGLOBIN: NORMAL % (ref 0–5)
CHLORIDE BLD-SCNC: 98 MMOL/L (ref 98–107)
CO2: 26 MMOL/L (ref 20–31)
CREAT SERPL-MCNC: 1.35 MG/DL (ref 0.5–0.9)
DIFFERENTIAL TYPE: ABNORMAL
EKG ATRIAL RATE: 70 BPM
EKG ATRIAL RATE: 82 BPM
EKG P AXIS: 55 DEGREES
EKG P AXIS: 68 DEGREES
EKG P-R INTERVAL: 146 MS
EKG P-R INTERVAL: 154 MS
EKG Q-T INTERVAL: 346 MS
EKG Q-T INTERVAL: 376 MS
EKG QRS DURATION: 60 MS
EKG QRS DURATION: 90 MS
EKG QTC CALCULATION (BAZETT): 373 MS
EKG QTC CALCULATION (BAZETT): 439 MS
EKG R AXIS: -38 DEGREES
EKG R AXIS: -53 DEGREES
EKG T AXIS: 76 DEGREES
EKG T AXIS: 90 DEGREES
EKG VENTRICULAR RATE: 70 BPM
EKG VENTRICULAR RATE: 82 BPM
EOSINOPHILS RELATIVE PERCENT: 3 % (ref 1–4)
FIO2: 50
FIO2: ABNORMAL
FIO2: NORMAL
GFR AFRICAN AMERICAN: 47 ML/MIN
GFR NON-AFRICAN AMERICAN: 39 ML/MIN
GFR SERPL CREATININE-BSD FRML MDRD: ABNORMAL ML/MIN/{1.73_M2}
GFR SERPL CREATININE-BSD FRML MDRD: ABNORMAL ML/MIN/{1.73_M2}
GLUCOSE BLD-MCNC: 100 MG/DL (ref 65–105)
GLUCOSE BLD-MCNC: 106 MG/DL (ref 70–99)
GLUCOSE BLD-MCNC: 118 MG/DL (ref 65–105)
GLUCOSE BLD-MCNC: 46 MG/DL (ref 65–105)
GLUCOSE BLD-MCNC: 87 MG/DL (ref 65–105)
GLUCOSE BLD-MCNC: 96 MG/DL (ref 65–105)
GLUCOSE BLD-MCNC: 97 MG/DL (ref 65–105)
HCO3 VENOUS: 29.8 MMOL/L (ref 24–30)
HCO3 VENOUS: NORMAL MMOL/L (ref 24–30)
HCT VFR BLD CALC: 44.7 % (ref 36.3–47.1)
HEMOGLOBIN: 14 G/DL (ref 11.9–15.1)
IMMATURE GRANULOCYTES: 1 %
LACTIC ACID, SEPSIS WHOLE BLOOD: 1.8 MMOL/L (ref 0.5–1.9)
LACTIC ACID, SEPSIS: NORMAL MMOL/L (ref 0.5–1.9)
LYMPHOCYTES # BLD: 17 % (ref 24–43)
MCH RBC QN AUTO: 30.8 PG (ref 25.2–33.5)
MCHC RBC AUTO-ENTMCNC: 31.3 G/DL (ref 28.4–34.8)
MCV RBC AUTO: 98.5 FL (ref 82.6–102.9)
METHEMOGLOBIN: ABNORMAL % (ref 0–1.5)
METHEMOGLOBIN: NORMAL % (ref 0–1.5)
MODE: ABNORMAL
MODE: ABNORMAL
MODE: NORMAL
MONOCYTES # BLD: 8 % (ref 3–12)
NEGATIVE BASE EXCESS, ART: ABNORMAL (ref 0–2)
NEGATIVE BASE EXCESS, VEN: 1.4 MMOL/L (ref 0–2)
NEGATIVE BASE EXCESS, VEN: NORMAL MMOL/L (ref 0–2)
NOTIFICATION TIME: ABNORMAL
NOTIFICATION TIME: NORMAL
NOTIFICATION: ABNORMAL
NOTIFICATION: NORMAL
NRBC AUTOMATED: 0 PER 100 WBC
O2 DEVICE/FLOW/%: ABNORMAL
O2 DEVICE/FLOW/%: ABNORMAL
O2 DEVICE/FLOW/%: NORMAL
O2 SAT, VEN: 53 % (ref 60–85)
O2 SAT, VEN: NORMAL % (ref 60–85)
OXYHEMOGLOBIN: ABNORMAL % (ref 95–98)
OXYHEMOGLOBIN: NORMAL % (ref 95–98)
PATIENT TEMP: 37
PATIENT TEMP: ABNORMAL
PATIENT TEMP: NORMAL
PCO2, VEN, TEMP ADJ: ABNORMAL MMHG (ref 39–55)
PCO2, VEN, TEMP ADJ: NORMAL MMHG (ref 39–55)
PCO2, VEN: 86.5 (ref 39–55)
PCO2, VEN: NORMAL (ref 39–55)
PDW BLD-RTO: 12.7 % (ref 11.8–14.4)
PEEP/CPAP: ABNORMAL
PEEP/CPAP: NORMAL
PH VENOUS: 7.16 (ref 7.32–7.42)
PH VENOUS: NORMAL (ref 7.32–7.42)
PH, VEN, TEMP ADJ: ABNORMAL (ref 7.32–7.42)
PH, VEN, TEMP ADJ: NORMAL (ref 7.32–7.42)
PLATELET # BLD: 179 K/UL (ref 138–453)
PLATELET ESTIMATE: ABNORMAL
PMV BLD AUTO: 11.9 FL (ref 8.1–13.5)
PO2, VEN, TEMP ADJ: ABNORMAL MMHG (ref 30–50)
PO2, VEN, TEMP ADJ: NORMAL MMHG (ref 30–50)
PO2, VEN: 28.3 (ref 30–50)
PO2, VEN: NORMAL (ref 30–50)
POC HCO3: 33.1 MMOL/L (ref 21–28)
POC O2 SATURATION: 98 % (ref 94–98)
POC PCO2 TEMP: ABNORMAL MM HG
POC PCO2: 77.6 MM HG (ref 35–48)
POC PH TEMP: ABNORMAL
POC PH: 7.24 (ref 7.35–7.45)
POC PO2 TEMP: ABNORMAL MM HG
POC PO2: 137.2 MM HG (ref 83–108)
POSITIVE BASE EXCESS, ART: 3 (ref 0–3)
POSITIVE BASE EXCESS, VEN: ABNORMAL MMOL/L (ref 0–2)
POSITIVE BASE EXCESS, VEN: NORMAL MMOL/L (ref 0–2)
POTASSIUM SERPL-SCNC: 4.9 MMOL/L (ref 3.7–5.3)
PRO-BNP: 145 PG/ML
PSV: ABNORMAL
PSV: NORMAL
PT. POSITION: ABNORMAL
PT. POSITION: NORMAL
RBC # BLD: 4.54 M/UL (ref 3.95–5.11)
RBC # BLD: ABNORMAL 10*6/UL
REASON FOR REJECTION: NORMAL
RESPIRATORY RATE: ABNORMAL
RESPIRATORY RATE: NORMAL
SAMPLE SITE: ABNORMAL
SAMPLE SITE: ABNORMAL
SAMPLE SITE: NORMAL
SEG NEUTROPHILS: 70 % (ref 36–65)
SEGMENTED NEUTROPHILS ABSOLUTE COUNT: 7.33 K/UL (ref 1.5–8.1)
SET RATE: ABNORMAL
SET RATE: NORMAL
SODIUM BLD-SCNC: 135 MMOL/L (ref 135–144)
TCO2 (CALC), ART: ABNORMAL MMOL/L (ref 22–29)
TEXT FOR RESPIRATORY: ABNORMAL
TEXT FOR RESPIRATORY: NORMAL
TOTAL HB: ABNORMAL G/DL (ref 12–16)
TOTAL HB: NORMAL G/DL (ref 12–16)
TOTAL PROTEIN: 7 G/DL (ref 6.4–8.3)
TOTAL RATE: ABNORMAL
TOTAL RATE: NORMAL
TROPONIN INTERP: NORMAL
TROPONIN T: NORMAL NG/ML
TROPONIN, HIGH SENSITIVITY: 10 NG/L (ref 0–14)
VT: ABNORMAL
VT: NORMAL
WBC # BLD: 10.4 K/UL (ref 3.5–11.3)
WBC # BLD: ABNORMAL 10*3/UL
ZZ NTE CLEAN UP: ORDERED TEST: NORMAL
ZZ NTE WITH NAME CLEAN UP: SPECIMEN SOURCE: NORMAL

## 2021-10-06 PROCEDURE — 85025 COMPLETE CBC W/AUTO DIFF WBC: CPT

## 2021-10-06 PROCEDURE — 82803 BLOOD GASES ANY COMBINATION: CPT

## 2021-10-06 PROCEDURE — 6370000000 HC RX 637 (ALT 250 FOR IP): Performed by: HEALTH CARE PROVIDER

## 2021-10-06 PROCEDURE — 82947 ASSAY GLUCOSE BLOOD QUANT: CPT

## 2021-10-06 PROCEDURE — 71045 X-RAY EXAM CHEST 1 VIEW: CPT

## 2021-10-06 PROCEDURE — 6360000002 HC RX W HCPCS: Performed by: HEALTH CARE PROVIDER

## 2021-10-06 PROCEDURE — 80053 COMPREHEN METABOLIC PANEL: CPT

## 2021-10-06 PROCEDURE — 82805 BLOOD GASES W/O2 SATURATION: CPT

## 2021-10-06 PROCEDURE — 99223 1ST HOSP IP/OBS HIGH 75: CPT | Performed by: INTERNAL MEDICINE

## 2021-10-06 PROCEDURE — 84484 ASSAY OF TROPONIN QUANT: CPT

## 2021-10-06 PROCEDURE — 93005 ELECTROCARDIOGRAM TRACING: CPT | Performed by: PEDIATRICS

## 2021-10-06 PROCEDURE — 2060000000 HC ICU INTERMEDIATE R&B

## 2021-10-06 PROCEDURE — 94660 CPAP INITIATION&MGMT: CPT

## 2021-10-06 PROCEDURE — 94640 AIRWAY INHALATION TREATMENT: CPT

## 2021-10-06 PROCEDURE — 36415 COLL VENOUS BLD VENIPUNCTURE: CPT

## 2021-10-06 PROCEDURE — 93010 ELECTROCARDIOGRAM REPORT: CPT | Performed by: INTERNAL MEDICINE

## 2021-10-06 PROCEDURE — 2500000003 HC RX 250 WO HCPCS: Performed by: HEALTH CARE PROVIDER

## 2021-10-06 PROCEDURE — 76937 US GUIDE VASCULAR ACCESS: CPT

## 2021-10-06 PROCEDURE — 2580000003 HC RX 258: Performed by: PEDIATRICS

## 2021-10-06 PROCEDURE — 94761 N-INVAS EAR/PLS OXIMETRY MLT: CPT

## 2021-10-06 PROCEDURE — 36600 WITHDRAWAL OF ARTERIAL BLOOD: CPT

## 2021-10-06 PROCEDURE — 83605 ASSAY OF LACTIC ACID: CPT

## 2021-10-06 PROCEDURE — 2700000000 HC OXYGEN THERAPY PER DAY

## 2021-10-06 PROCEDURE — 83880 ASSAY OF NATRIURETIC PEPTIDE: CPT

## 2021-10-06 RX ORDER — NOREPINEPHRINE BIT/0.9 % NACL 16MG/250ML
2 INFUSION BOTTLE (ML) INTRAVENOUS CONTINUOUS
Status: DISCONTINUED | OUTPATIENT
Start: 2021-10-06 | End: 2021-10-06

## 2021-10-06 RX ORDER — 0.9 % SODIUM CHLORIDE 0.9 %
1000 INTRAVENOUS SOLUTION INTRAVENOUS ONCE
Status: COMPLETED | OUTPATIENT
Start: 2021-10-06 | End: 2021-10-06

## 2021-10-06 RX ADMIN — IPRATROPIUM BROMIDE AND ALBUTEROL SULFATE 3 ML: .5; 2.5 SOLUTION RESPIRATORY (INHALATION) at 20:20

## 2021-10-06 RX ADMIN — INSULIN GLARGINE 50 UNITS: 100 INJECTION, SOLUTION SUBCUTANEOUS at 00:10

## 2021-10-06 RX ADMIN — ENOXAPARIN SODIUM 40 MG: 40 INJECTION SUBCUTANEOUS at 16:00

## 2021-10-06 RX ADMIN — ALBUTEROL SULFATE 2.5 MG: 2.5 SOLUTION RESPIRATORY (INHALATION) at 08:39

## 2021-10-06 RX ADMIN — IPRATROPIUM BROMIDE AND ALBUTEROL SULFATE 3 ML: .5; 2.5 SOLUTION RESPIRATORY (INHALATION) at 16:12

## 2021-10-06 RX ADMIN — CITALOPRAM 20 MG: 20 TABLET, FILM COATED ORAL at 20:54

## 2021-10-06 RX ADMIN — DEXTROSE MONOHYDRATE 12.5 G: 25 INJECTION, SOLUTION INTRAVENOUS at 18:06

## 2021-10-06 RX ADMIN — TOPIRAMATE 100 MG: 100 TABLET, FILM COATED ORAL at 20:54

## 2021-10-06 RX ADMIN — IPRATROPIUM BROMIDE AND ALBUTEROL SULFATE 3 ML: .5; 2.5 SOLUTION RESPIRATORY (INHALATION) at 12:50

## 2021-10-06 RX ADMIN — SODIUM CHLORIDE 1000 ML: 9 INJECTION, SOLUTION INTRAVENOUS at 08:40

## 2021-10-06 RX ADMIN — IPRATROPIUM BROMIDE AND ALBUTEROL SULFATE 3 ML: .5; 2.5 SOLUTION RESPIRATORY (INHALATION) at 08:42

## 2021-10-06 RX ADMIN — PANTOPRAZOLE SODIUM 40 MG: 40 TABLET, DELAYED RELEASE ORAL at 20:54

## 2021-10-06 RX ADMIN — DESMOPRESSIN ACETATE 40 MG: 0.2 TABLET ORAL at 20:54

## 2021-10-06 RX ADMIN — GABAPENTIN 300 MG: 300 CAPSULE ORAL at 20:54

## 2021-10-06 RX ADMIN — MAGNESIUM GLUCONATE 500 MG ORAL TABLET 400 MG: 500 TABLET ORAL at 20:54

## 2021-10-06 ASSESSMENT — ENCOUNTER SYMPTOMS
SHORTNESS OF BREATH: 1
ABDOMINAL DISTENTION: 0
BLOOD IN STOOL: 0
COUGH: 1
APNEA: 1
NAUSEA: 0
VOMITING: 0
PHOTOPHOBIA: 0
BACK PAIN: 1
WHEEZING: 0

## 2021-10-06 NOTE — CARE COORDINATION
Case Management Initial Discharge Plan  Enrico Vogt,             Met with:patient to discuss discharge plans. Information verified: address, contacts, phone number, , insurance Yes  Insurance Provider: [facility]    Emergency Contact/Next of Kin name & number: gela Kearney Paget [phone]  Who are involved in patient's support system? son    PCP: Josh Bedolla MD  Date of last visit: Oct. 2021      Discharge Planning    Living Arrangements:  Alone     Home-8th floor apartment with elevator    Patient able to perform ADL's:Independent    Current Services (outpatient & in home) [address]  DME equipment: walker, cane, w/c, CPAP  DME provider:     Is patient receiving oral anticoagulation therapy? No        Potential Assistance Needed:  Home Care    Patient agreeable to home care: Yes-[address] of choice provided:  yes    Prior SNF/Rehab Placement and Facility: N/A  Agreeable to SNF/Rehab: No  [place] of choice provided: n/a     Evaluation: n/a    Expected Discharge date:  10/09/21    Patient expects to be discharged to:   home    If home: is the family and/or caregiver wiling & able to provide support at home? yes  Who will be providing this support? son*    Follow Up Appointment: Best Day/ Time:      Transportation provider: gela  Transportation arrangements needed for discharge: No    Readmission Risk              Risk of Unplanned Readmission:  29             Does patient have a readmission risk score greater than 14?: Yes  If yes, follow-up appointment must be made within 7 days of discharge.      Goals of Care: no chest pain      Educated patient on transitional options, provided freedom of choice and are agreeable with plan      Discharge Plan: home alone, hospitals has [address] HC-faxed face sheet and left message    Pharmacy-[place]      Electronically signed by Mimi Madison RN on 10/6/21 at 5:59 PM EDT

## 2021-10-06 NOTE — CONSULTS
Attending Physician Statement:    I have discussed the care of  Doyle Shook , including pertinent history and exam findings, with the Cardiology fellow/resident. I have seen and examined the patient and the key elements of all parts of the encounter have been performed by me. I agree with the assessment, plan and orders as documented by the fellow/resident, after I modified exam findings and plan of treatments, and the final version is my approved version of the assessment. Additional Comments:   Acute hypercapnic resp failure, likely from copd exacerbation, patient is poor historian and but reported sharp left chest pain on admission, currently on bipap and drowsy. ecg and troponins negative for ACS. CXR shows left infrahilar infiltrate. Recent stress test was negative for ischemia. Will repeat echo to assess LV function. Management of copd per primary. Consider IV antibiotics. [facility] Cardiology Cardiology    Consult / H&P               Today's Date: 10/6/2021  Patient Name: Doyle Shook  Date of admission: 10/5/2021  6:18 PM  Patient's age: 69 y. o., 1950  Admission Dx: Other chest pain [R07.89]  Nausea [R11.0]  Atypical chest pain [R07.89]    Reason for Consult:  Cardiac evaluation    Requesting Physician: Shelia Hurley MD    CHIEF COMPLAINT:  Hypotension, arrhythmia     History Obtained From:  electronic medical record, patient drowsy    HISTORY OF PRESENT ILLNESS:      The patient is a 69 y.o.  female who is admitted to the hospital for chest pain, intermittent, electrical shooting on L side, radiating to her back, under scapula, lasting approximately 20 seconds, also associated with L arm pain and weakness, tenderness in deltoid region.  She also experienced increased sleepiness - As per EMR    When I went to see patient, patient was drowsy and opening eyes but going right back to sleep    Started on levophed Didn't receive any yet, for hypotension   At home on asa, statin, coreg, nitrostat,  Plavix, lasix  On plavix for TIA    PMH - DM, CHF, COPD, on 2L home oxygen, CKD, TIA  Patient BP fluctuating 60's- 100's/ 40's - 60's, on Bipap at 40 fio2   Labs show YAJAIRA  EKG sinus rhythm with sinus arrhthymias, Left axis deviation  Pro , troponin 10, 10  CXR - L left infrahilar opacity    Past cardiac history - follows at [facility] - Dr. Grey Jones  Echo 02/02/2020 - showed EF 66%, G1DD  Stress test 04/06/2021 - showed no ischemia/infarct, EF 70%  Cath 2012 - 60% small 1st diagonal branch disease of LAD on cardiac cath, EF 70%   Prior ablation of A.  Fib at [facility] in 2001    Past Medical History:   has a past medical history of Abdominal bloating, Adenomatous polyp of ascending colon, Allergic rhinitis, Anxiety, Arthritis, Asthma, Atrial fibrillation ([address] Back pain, Benign hypertension with CKD (chronic kidney disease) stage III ([address] CAD (coronary artery disease), Caffeine use, CHF (congestive heart failure) ([address] Chronic kidney disease, CKD (chronic kidney disease) stage 3, GFR 30-59 ml/min ([facility]), COPD (chronic obstructive pulmonary disease) ([address] Degeneration of lumbar or lumbosacral intervertebral disc, Depression, DM (diabetes mellitus) ([address] Emphysema of lung ([address] Gastritis, GERD (gastroesophageal reflux disease), Hearing loss, Hematuria, Hiatal hernia, HTN (hypertension), benign, Hypertriglyceridemia, Incontinence of urine, Irritable bowel syndrome with both constipation and diarrhea, Knee arthropathy, Lumbosacral spondylosis without myelopathy, Migraine headache, Mumps, Neuropathy, Obesity, On home oxygen therapy, HIGINIO on CPAP, Otitis media, Secondary diabetes mellitus with stage 3 chronic kidney disease (GFR 30-59) ([address] Stroke ([address] Tinnitus, Type 2 diabetes mellitus without complication ([address] Type II or unspecified type diabetes mellitus without mention of complication, not stated as uncontrolled, UTI (lower urinary tract infection), and Varicose vein.    Past Surgical History:   has a past surgical history that includes Cholecystectomy (2007); Carpal tunnel release (Right); Tympanoplasty; Dilation & curettage (1979); Cystocopy (9/25/2013); Cataract removal with implant (Bilateral); Tonsillectomy; Incontinence surgery; ablation of dysrhythmic focus (2000); Upper gastrointestinal endoscopy (03/2016); eye surgery; Tubal ligation; other surgical history (09/10/15); Insertable Cardiac Monitor (12/04/2015); Tympanoplasty; Colonoscopy; Colonoscopy (04/10/2017); pr colsc flx w/removal lesion by hot bx forceps (N/A, 4/10/2017); Tympanostomy tube placement (08/21/2017); Upper gastrointestinal endoscopy (N/A, 3/2/2021); and Colonoscopy (N/A, 3/2/2021). Home Medications:    Prior to Admission medications    Medication Sig Start Date End Date Taking? Authorizing Provider   metFORMIN (GLUCOPHAGE) 1000 MG tablet TAKE 1 TAB BY MOUTH TWICE A DAY ( IN THE MORNING AND BEDTIME )  10/1/21  Yes Shlomo Linder MD   oxyCODONE-acetaminophen (PERCOCET) 5-325 MG per tablet Take 1 tablet by mouth See Admin Instructions for 30 days. Intended supply: 30 days.  1 tab 3-4 times a day prn 10/1/21 10/31/21 Yes VERONIKA Haley - CNP   insulin glargine (LANTUS SOLOSTAR) 100 UNIT/ML injection pen Inject 42 Units into the skin nightly  Patient taking differently: Inject 50 Units into the skin 2 times daily  9/27/21  Yes Shlomo Linder MD   blood glucose test strips (ONETOUCH ULTRA) strip TEST TWO (2) TO THREE (3) TIMES A DAY & AS NEEDED FOR SYMPTOMS OF IRREGULAR BLOOD GLUCOSE 8/23/21  Yes Shlomo Linder MD   blood glucose test strips (ONETOUCH ULTRA) strip TEST TWO (2) TO THREE (3) TIMES A DAY & AS NEEDED FOR SYMPTOMS OF IRREGULAR BLOOD GLUCOSE 8/20/21  Yes Shlomo Linder MD   ASPIRIN LOW DOSE 81 MG EC tablet TAKE 1 TAB BY MOUTH ONCE A DAY  4/23/21  Yes Shlomo Linder MD   Blood Glucose Monitoring Suppl (ONE TOUCH ULTRA 2) w/Device KIT  2/10/21  Yes Historical Provider, MD   [facility] ULTRA strip TEST TWO (2) TO THREE (3) TIMES A DAY& AS NEEDED  3/5/21  Yes Soraya Tom MD   insulin aspart (NOVOLOG FLEXPEN) 100 UNIT/ML injection pen IF <139 - NO INSULIN; 140-199-2 UNITS;200-249-4 UNITS;250-299-6 UNITS;300-349-8 UNITS;350-400=10 UNITS;ABOVE 400-12 UNITS 3/3/21  Yes Soraya Tom MD   atorvastatin (LIPITOR) 40 MG tablet Take 1 tablet by mouth daily 2/28/21  Yes Soraya Tom MD   Multiple Vitamins-Minerals (THERAPEUTIC MULTIVITAMIN-MINERALS) tablet Take 1 tablet by mouth daily Takes Women over 50 Complete Vitamin daily ([employer] brand)   Yes Historical Provider, MD   BiPAP Machine MISC repair/replace Bipap maintain 18/9CMH2O, Cflex=3,mask,tubing,filters,headgear,heated humidifier,all supplies PRN 1/28/21  Yes Historical Provider, MD   Psyllium (METAMUCIL PO) Take by mouth 3 times daily Takes powder   Yes Historical Provider, MD   blood glucose test strips (TRUE METRIX BLOOD GLUCOSE TEST) strip THREE TIMES A DAY 1/21/21  Yes Soraya Tom MD   ferrous sulfate (IRON 325) 325 (65 Fe) MG tablet TAKE 1 TAB BY MOUTH EVERY MORNING  1/15/21  Yes Soraya Tom MD   citalopram (CELEXA) 40 MG tablet TAKE 1 2 TABLET BY MOUTH TWICE A DAY  Patient taking differently: Take 20 mg by mouth 2 times daily  1/15/21  Yes Soraya Tom MD   carvedilol (COREG) 3.125 MG tablet TAKE 1 TAB BY MOUTH TWICE A DAY ( IN THE MORNING AND BEDTIME )  1/15/21  Yes Soraya Tom MD   losartan (COZAAR) 25 MG tablet TAKE 1 TAB BY MOUTH ONCE A DAY ( IN THE MORNING ) 1/15/21  Yes Soraya Tom MD   magnesium oxide (MAG-OX) 400 MG tablet TAKE 1 TAB BY MOUTH TWICE A DAY ( IN THE MORNING AND BEDTIME ) 1/15/21  Yes Soraya Tom MD   topiramate (TOPAMAX) 100 MG tablet Take 1 tablet by mouth nightly 11/30/20  Yes Vero Sumner MD   dicyclomine (BENTYL) 10 MG capsule Take 1 capsule by mouth 2 times daily as needed (abdominal spasm) 11/25/20  Yes Soraya Tom MD   albuterol (PROVENTIL) (2.5 MG/3ML) 0.083% nebulizer solution Take 3 mLs by nebulization every 6 hours as needed for Wheezing 11/3/20  Yes Yolis Arambula MD   Icosapent Ethyl (VASCEPA) 1 g CAPS capsule Take 1 capsule by mouth 2 times daily 9/14/20  Yes Rancho Quiroz MD   vitamin B-12 (CYANOCOBALAMIN) 100 MCG tablet Take 50 mcg by mouth daily   Yes Historical Provider, MD   ULTICARE MINI PEN NEEDLES 31G X 6 MM MISC USE AS DIRECTED  8/14/20  Yes Rancho Quiroz MD   gabapentin (NEURONTIN) 300 MG capsule Take 1 capsule by mouth 3 times daily for 30 days. Patient taking differently: Take 300 mg by mouth 2 times daily. 7/30/20 10/5/21 Yes VERONIKA Del Rosario - CNP   Lift Chair MISC by Does not apply route 9/24/19  Yes Rancho Quiroz MD   Misc.  Devices (ADJUST BATH/SHOWER SEAT/BACK) MISC Use daily for shower 9/24/19  Yes Rancho Quiroz MD   pantoprazole (PROTONIX) 40 MG tablet Take 1 tablet by mouth 2 times daily 3/15/19  Yes Sandra Tavarez, DO   Alcohol Swabs (B-D SINGLE USE SWABS REGULAR) PADS THREE TIMES A DAY 12/12/18  Yes Rancho Quiroz MD   nitroGLYCERIN (NITROSTAT) 0.4 MG SL tablet 1 under the tongue as needed for angina, may repeat q5mins for up three doses 8/28/18  Yes Historical Provider, MD   Blood Glucose Monitoring Suppl HARMEET Use daily to check BS 3/27/18  Yes Rancho Quiroz MD   ipratropium-albuterol (DUONEB) 0.5-2.5 (3) MG/3ML SOLN nebulizer solution Inhale 3 mLs into the lungs every 4 hours 2/6/18  Yes Cece Sue MD   Melatonin 10 MG TABS Take 10 mg by mouth nightly 10/19/17  Yes Rancho Quiroz MD   docusate sodium (COLACE) 100 MG capsule Take 1 capsule by mouth 2 times daily Please use while taking Percocet 9/10/15  Yes Chris Moore MD   SYMBICORT 160-4.5 MCG/ACT AERO   2 puffs As needed for sob 5/28/15  Yes Historical Provider, MD   OXYGEN Inhale 3 L into the lungs    Yes Historical Provider, MD   ibuprofen (ADVIL;MOTRIN) 800 MG tablet Take 1 tablet by mouth every 8 hours as needed for Pain 4/30/21 5/26/21  Arline Arthur MD   clopidogrel (PLAVIX) 75 MG tablet TAKE 1 TAB BY MOUTH ONCE A DAY ( IN THE MORNING ) -THIS MEDICINE MAY BE TAKENWITH OR WITHOUT FOOD  4/14/21   Boone Hargrove MD   furosemide (LASIX) 20 MG tablet TAKE 1 TAB BY MOUTH ONCE A DAY AS NEEDED ( WEIGHT GAIN 3 LBS OVERNIGHT )  10/31/20   Boone Hargrove MD   isosorbide dinitrate (ISORDIL) 30 MG tablet Take 30 mg by mouth    Historical Provider, MD   nystatin (MYCOSTATIN) 123860 UNIT/GM powder Apply 3 times daily.  6/25/20   Boone Hargrove MD   lidocaine (LMX) 4 % cream Apply topically every 8 hrs as needed for pain 11/11/19   Boone Hargrove MD   Compression Stockings MISC by Does not apply route Pressure between 20 - 25 . 9/11/17   Boone Hargrove MD      Current Facility-Administered Medications: norepinephrine (LEVOPHED) 16 mg in sodium chloride 0.9 % 250 mL infusion, 2 mcg/min, IntraVENous, Continuous  sodium chloride flush 0.9 % injection 5-40 mL, 5-40 mL, IntraVENous, 2 times per day  sodium chloride flush 0.9 % injection 5-40 mL, 5-40 mL, IntraVENous, PRN  0.9 % sodium chloride infusion, 25 mL, IntraVENous, PRN  enoxaparin (LOVENOX) injection 40 mg, 40 mg, SubCUTAneous, Daily  ondansetron (ZOFRAN-ODT) disintegrating tablet 4 mg, 4 mg, Oral, Q8H PRN **OR** ondansetron (ZOFRAN) injection 4 mg, 4 mg, IntraVENous, Q6H PRN  acetaminophen (TYLENOL) tablet 1,000 mg, 1,000 mg, Oral, Q8H PRN  albuterol (PROVENTIL) nebulizer solution 2.5 mg, 2.5 mg, Nebulization, Q6H PRN  atorvastatin (LIPITOR) tablet 40 mg, 40 mg, Oral, Daily  [Held by provider] carvedilol (COREG) tablet 3.125 mg, 3.125 mg, Oral, BID  citalopram (CELEXA) tablet 20 mg, 20 mg, Oral, BID  dicyclomine (BENTYL) capsule 10 mg, 10 mg, Oral, BID PRN  gabapentin (NEURONTIN) capsule 300 mg, 300 mg, Oral, BID  insulin glargine (LANTUS) injection vial 50 Units, 50 Units, SubCUTAneous, BID  ipratropium-albuterol (DUONEB) nebulizer solution 3 mL, 3 mL, Inhalation, Q4H  [Held by provider] losartan (COZAAR) tablet 25 mg, 25 mg, Oral, Daily  magnesium oxide (MAG-OX) tablet 400 mg, 400 mg, Oral, BID  melatonin tablet 10 mg, 10 mg, Oral, Nightly  oxyCODONE-acetaminophen (PERCOCET) 5-325 MG per tablet 1 tablet, 1 tablet, Oral, TID PRN  pantoprazole (PROTONIX) tablet 40 mg, 40 mg, Oral, BID  budesonide-formoterol (SYMBICORT) 160-4.5 MCG/ACT inhaler 2 puff, 2 puff, Inhalation, Daily  topiramate (TOPAMAX) tablet 100 mg, 100 mg, Oral, Nightly  glucose (GLUTOSE) 40 % oral gel 15 g, 15 g, Oral, PRN  dextrose 50 % IV solution, 12.5 g, IntraVENous, PRN  glucagon (rDNA) injection 1 mg, 1 mg, IntraMUSCular, PRN  dextrose 5 % solution, 100 mL/hr, IntraVENous, PRN  insulin lispro (HUMALOG) injection vial 0-12 Units, 0-12 Units, SubCUTAneous, TID WC  insulin lispro (HUMALOG) injection vial 0-6 Units, 0-6 Units, SubCUTAneous, Nightly  influenza quadrivalent split vaccine (FLUZONE;FLUARIX;FLULAVAL;AFLURIA) injection 0.5 mL, 0.5 mL, IntraMUSCular, Prior to discharge    Allergies:  Robitussin [guaifenesin], Codeine, Compazine [prochlorperazine maleate], Iodides, Morphine, Moxifloxacin, Reglan [metoclopramide], Avelox [moxifloxacin hcl in nacl], Cipro xr, and Sulfa antibiotics    Social History:   reports that she quit smoking about 21 years ago. Her smoking use included cigarettes. She has a 123.00 pack-year smoking history. She has never used smokeless tobacco. She reports that she does not drink alcohol and does not use drugs. Family History: family history includes Diabetes in her maternal grandmother and mother; Emphysema in her sister; Heart Disease in her mother; Stomach Cancer in her father. No h/o sudden cardiac death. No for premature CAD    REVIEW OF SYSTEMS:    · Constitutional: there has been no unanticipated weight loss. There's been No change in energy level, No change in activity level. · Eyes: No visual changes or diplopia. No scleral icterus.   · ENT: No Headaches  · Cardiovascular: chest pain  · Respiratory: No previous pulmonary problems, No cough  · Gastrointestinal: No abdominal pain. No change in bowel or bladder habits. · Genitourinary: No dysuria, trouble voiding, or hematuria. · Musculoskeletal:  No gait disturbance, No weakness or joint complaints. · Integumentary: No rash or pruritis. · Neurological: No headache, diplopia, change in muscle strength, numbness or tingling. No change in gait, balance, coordination, mood, affect, memory, mentation, behavior. · Psychiatric: No anxiety, or depression. · Endocrine: No temperature intolerance. No excessive thirst, fluid intake, or urination. No tremor. · Hematologic/Lymphatic: No abnormal bruising or bleeding, blood clots or swollen lymph nodes. · Allergic/Immunologic: No nasal congestion or hives. PHYSICAL EXAM:      BP (!) 120/54   Pulse 79   Temp 97.2 °F (36.2 °C) (Oral)   Resp 16   Ht 5' 2\" (1.575 m)   Wt 250 lb (113.4 kg)   LMP  (LMP Unknown)   SpO2 97%   BMI 45.73 kg/m²    Constitutional and General Appearance: alert, cooperative, no distress and appears stated age  HEENT: PERRL, no cervical lymphadenopathy. No masses palpable. Normal oral mucosa  Respiratory:  · Normal excursion and expansion without use of accessory muscles  · Resp Auscultation: Good respiratory effort. No for increased work of breathing. On auscultation: clear to auscultation bilaterally  Cardiovascular:  · The apical impulse is not displaced  · Heart tones are crisp and normal. regular S1 and S2.  · Jugular venous pulsation Normal  · The carotid upstroke is normal in amplitude and contour without delay or bruit  · Peripheral pulses are symmetrical and full   Abdomen:   · No masses or tenderness  · Bowel sounds present  Extremities:  ·  No Cyanosis or Clubbing  ·  Lower extremity edema: No  ·  Skin: Warm and dry  Neurological:  · Alert and oriented.   · Moves all extremities well  · No abnormalities of mood, affect, memory, mentation, or behavior are noted    DATA:    Diagnostics:      EKG:   Sinus rhythm with sinus arrhthymias, Left atrial deviation, increased QTc compared to previous EKG    ECHO:   Echo 02/02/2020 - showed EF 66%, G1DD. Stress Test:   Stress test 04/06/2021 - showed no ischemia/infarct, EF 70%. Cardiac Angiography:   Cath 2012 - 60% small 1st diagonal branch disease of LAD on cardiac cath, EF 70% . Labs:     CBC:   Recent Labs     10/05/21  1852 10/06/21  0853   WBC 7.2 10.4   HGB 14.4 14.0   HCT 47.9* 44.7    179     BMP:   Recent Labs     10/05/21  1852 10/06/21  0847    135   K 4.2 4.9   CO2 27 26   BUN 17 22   CREATININE 1.01* 1.35*   LABGLOM 54* 39*   GLUCOSE 84 106*     BNP: No results for input(s): BNP in the last 72 hours. PT/INR: No results for input(s): PROTIME, INR in the last 72 hours. APTT:No results for input(s): APTT in the last 72 hours. CARDIAC ENZYMES:No results for input(s): CKTOTAL, CKMB, CKMBINDEX, TROPONINI in the last 72 hours. FASTING LIPID PANEL:  Lab Results   Component Value Date    HDL 37 02/25/2021    TRIG 289 02/25/2021     LIVER PROFILE:  Recent Labs     10/05/21  1852 10/06/21  0847   AST 56* 48*   ALT 39* 34*   LABALBU 3.9 3.6       IMPRESSION:    1. Atypical chest pain  2. Sinus arrhythmia? with hypotension; prior history of A. Fib s/p ablation 2001? 3. HFpEF, Echo 02/02/2020 - showed EF 66%, G1DD  4. Acute hypoxic respiratory failure secondary COPD  5. Diabetes mellitis   6.  CKD stage II, with superimposed YAJAIRA     Patient Active Problem List   Diagnosis    Chronic pain of left knee    Type 2 diabetes mellitus with diabetic polyneuropathy, with long-term current use of insulin (HCC)    Nonintractable migraine, unspecified migraine type    Coronary artery disease due to lipid rich plaque    DDD (degenerative disc disease), lumbar    Chronic, continuous use of opioids    DDD (degenerative disc disease), cervical    Osteoarthritis of cervical spine    Medication monitoring encounter    GERD (gastroesophageal reflux disease)    HTN (hypertension), benign    Mixed hyperlipidemia    Insomnia    Lumbosacral spondylosis without myelopathy    Sacroiliitis, not elsewhere classified ([facility]    Carpal tunnel syndrome    Mixed stress and urge urinary incontinence    Bilateral low back pain with sciatica    Intractable migraine with aura without status migrainosus    Spondylarthrosis    Anxiety and depression    Chronic migraine without aura without status migrainosus, not intractable    Morbid (severe) obesity with alveolar hypoventilation (HCC)    Lung nodule    Neuropathy    Obstructive sleep apnea syndrome    Asthma with COPD [facility]    Gastroesophageal reflux disease with esophagitis    Morbid obesity with BMI of 40.0-44.9, adult ([facility]    Congestive heart failure (HCC)    Stage 3 chronic kidney disease (HCC)    Benign hypertension with CKD (chronic kidney disease) stage III (HCC)    Secondary diabetes mellitus with stage 3 chronic kidney disease (GFR 30-59) (HCC)    CKD (chronic kidney disease) stage 3, GFR 30-59 ml/min    Episode of altered cognition    Lumbar radiculopathy, chronic    Sessile colonic polyp    Gastroesophageal reflux disease    Morbid obesity (HCC)    Adenomatous polyp of ascending colon    Irritable bowel syndrome with both constipation and diarrhea    Abdominal bloating    Long term (current) use of insulin (HCC)    Major depressive disorder, single episode, unspecified    Permanent atrial fibrillation (HCC)    Polyneuropathy, unspecified    Other chronic pain    Opioid use, unspecified, uncomplicated    Chest pain    Atypical chest pain       RECOMMENDATIONS:  1. Hold coreg due to hypotension  2. History of recent stress test, negative for infarct/ischemia. 3. Recommend to repeat echo   4.  Further recommendations to follow       Kerri Sepulveda MD  Cardiology Service  Internal Medicine Residency Program, PGY-1  [facility]

## 2021-10-06 NOTE — H&P
[facility]  CDU / OBSERVATION eNCOUnter  Resident Note     Pt Name: Arthur Callejas  MRN: [id number]  [place] 1950  Date of evaluation: 10/6/21  Patient's PCP is :  Carly Finn MD    [address] [place] [address]       Chief Complaint   Patient presents with    Chest Pain     x1 wk       HISTORY OF PRESENT ILLNESS    Arthur Callejas is a 69 y.o. female who presents for atypical chest pain to the emergency department. Known past medical history of type 2 diabetes with polyneuropathy on insulin  HIGINIO with significant hypoxia while sleeping. Morbid obesity with hypoventilation. Asthma with COPD. Left ventricular diastolic dysfunction  Stage III CKD  Requiring Narcan for unresponsive hypersomnolence on admissions in the past.-No Narcan provided today. Patient follows with pain management for her chronic pain and receives 100 pills/month. Narcan prescriptions at home. Patient very poor historian during HPI gathering this morning. See transfer note. REVIEW OF SYSTEMS     Patient denies chest pain, or abdominal pain or headache, and complains of \"tired\". Unable to obtain full ROS - due to patients condition. (PQRS) Advance directives on face sheet per hospital policy.  No change unless specifically mentioned in chart    PAST MEDICAL HISTORY    has a past medical history of Abdominal bloating, Adenomatous polyp of ascending colon, Allergic rhinitis, Anxiety, Arthritis, Asthma, Atrial fibrillation ([address] Back pain, Benign hypertension with CKD (chronic kidney disease) stage III ([address] CAD (coronary artery disease), Caffeine use, CHF (congestive heart failure) ([address] Chronic kidney disease, CKD (chronic kidney disease) stage 3, GFR 30-59 ml/min ([facility]), COPD (chronic obstructive pulmonary disease) ([address] Degeneration of lumbar or lumbosacral intervertebral disc, Depression, DM (diabetes mellitus) ([address] Emphysema of lung ([address] Gastritis, GERD (gastroesophageal reflux disease), Hearing loss, Hematuria, Hiatal hernia, HTN (hypertension), benign, Hypertriglyceridemia, Incontinence of urine, Irritable bowel syndrome with both constipation and diarrhea, Knee arthropathy, Lumbosacral spondylosis without myelopathy, Migraine headache, Mumps, Neuropathy, Obesity, On home oxygen therapy, HIGINIO on CPAP, Otitis media, Secondary diabetes mellitus with stage 3 chronic kidney disease (GFR 30-59) ([facility]), Stroke ([facility] Tinnitus, Type 2 diabetes mellitus without complication ([facility] Type II or unspecified type diabetes mellitus without mention of complication, not stated as uncontrolled, UTI (lower urinary tract infection), and Varicose vein. I have reviewed the past medical history with the patient and it is pertinent to this complaint. SURGICAL HISTORY      has a past surgical history that includes Cholecystectomy (2007); Carpal tunnel release (Right); Tympanoplasty; Dilation & curettage (1979); Cystocopy (9/25/2013); Cataract removal with implant (Bilateral); Tonsillectomy; Incontinence surgery; ablation of dysrhythmic focus (2000); Upper gastrointestinal endoscopy (03/2016); eye surgery; Tubal ligation; other surgical history (09/10/15); Insertable Cardiac Monitor (12/04/2015); Tympanoplasty; Colonoscopy; Colonoscopy (04/10/2017); pr colsc flx w/removal lesion by hot bx forceps (N/A, 4/10/2017); Tympanostomy tube placement (08/21/2017); Upper gastrointestinal endoscopy (N/A, 3/2/2021); and Colonoscopy (N/A, 3/2/2021). I have reviewed and agree with Surgical History entered and it is pertinent to this complaint.      CURRENT MEDICATIONS     0.9 % sodium chloride bolus, Once  sodium chloride flush 0.9 % injection 5-40 mL, 2 times per day  sodium chloride flush 0.9 % injection 5-40 mL, PRN  0.9 % sodium chloride infusion, PRN  enoxaparin (LOVENOX) injection 40 mg, Daily  ondansetron (ZOFRAN-ODT) disintegrating tablet 4 mg, Q8H PRN   Or  ondansetron (ZOFRAN) injection 4 mg, Q6H PRN  acetaminophen (TYLENOL) tablet 1,000 mg, Q8H PRN  albuterol (PROVENTIL) nebulizer solution 2.5 mg, Q6H PRN  atorvastatin (LIPITOR) tablet 40 mg, Daily  carvedilol (COREG) tablet 3.125 mg, BID  citalopram (CELEXA) tablet 20 mg, BID  dicyclomine (BENTYL) capsule 10 mg, BID PRN  gabapentin (NEURONTIN) capsule 300 mg, BID  insulin glargine (LANTUS) injection vial 50 Units, BID  ipratropium-albuterol (DUONEB) nebulizer solution 3 mL, Q4H  losartan (COZAAR) tablet 25 mg, Daily  magnesium oxide (MAG-OX) tablet 400 mg, BID  melatonin tablet 10 mg, Nightly  oxyCODONE-acetaminophen (PERCOCET) 5-325 MG per tablet 1 tablet, TID PRN  pantoprazole (PROTONIX) tablet 40 mg, BID  budesonide-formoterol (SYMBICORT) 160-4.5 MCG/ACT inhaler 2 puff, Daily  topiramate (TOPAMAX) tablet 100 mg, Nightly  glucose (GLUTOSE) 40 % oral gel 15 g, PRN  dextrose 50 % IV solution, PRN  glucagon (rDNA) injection 1 mg, PRN  dextrose 5 % solution, PRN  insulin lispro (HUMALOG) injection vial 0-12 Units, TID WC  insulin lispro (HUMALOG) injection vial 0-6 Units, Nightly  influenza quadrivalent split vaccine (FLUZONE;FLUARIX;FLULAVAL;AFLURIA) injection 0.5 mL, Prior to discharge        All medication charted and reviewed. ALLERGIES     is allergic to robitussin [guaifenesin], codeine, compazine [prochlorperazine maleate], iodides, morphine, moxifloxacin, reglan [metoclopramide], avelox [moxifloxacin hcl in nacl], cipro xr, and sulfa antibiotics. FAMILY HISTORY     She indicated that her mother is . She indicated that her father is . She indicated that her sister is . She indicated that the status of her maternal grandmother is unknown.     family history includes Diabetes in her maternal grandmother and mother; Emphysema in her sister; Heart Disease in her mother; Stomach Cancer in her father. The patient denies any pertinent family history. I have reviewed and agree with the family history entered.   I have reviewed the Family History and it is not significant to the case    SOCIAL HISTORY      reports that she quit smoking about 21 years ago. Her smoking use included cigarettes. She has a 123.00 pack-year smoking history. She has never used smokeless tobacco. She reports that she does not drink alcohol and does not use drugs. I have reviewed and agree with all Social.  There are concerns for substance abuse/use. PHYSICAL EXAM     INITIAL VITALS:  height is 5' 2\" (1.575 m) and weight is 250 lb (113.4 kg). Her oral temperature is 97 °F (36.1 °C). Her blood pressure is 85/46 (abnormal) and her pulse is 74. Her respiration is 14 and oxygen saturation is 98%. CONSTITUTIONAL:  Very pale, hypersomnolent, responsive to tactile stimuli only initially and then responsive to voice after oxygen placement. Patient much improved on BiPAP and alert and oriented -on reassessments. HEAD:  Abnormal facies, hypertelorism, long flat philtrum, normocephalic atraumatic   EYES:  Opens eyes to pain and then after oxygen would open eyes to voice. Pupils equal and reactive sclera normal.   ENT:  Dry mucous membranes   NECK: supple, symmetric   BACK:  Did not examine   LUNGS:  Lungs were clear without wheezing rhonchi or rales   CARDIOVASCULAR:  And initial encounter patient had palpable pulses with regular rate and rhythm of her heart however heard heart tones were distant, after resuscitation of fluids and oxygen her cardiovascular exam was unchanged   ABDOMEN: soft, non-tender to deep palpation, non-distended with normal active bowel sounds on initial assessment, after oxygen placed in SPO2 increased to the 90s patient complained of having to urinate with palpation of abdomen on reassessment   NEUROLOGIC:   On initial assessment with decreased tone patient had to be sternal rub to make a fist or open her eyes. With oxygen placement she became more alert and responsive. Patient able to move all extremities.      MUSCULOSKELETAL:  Initially was floppy while sleeping and then would have better tone when sternal rub and made to respond, after oxygen placement patient had much better tone and able to make a fist with both hands dorsiflex and plantar flex with good strength. No obvious deformities to upper or lower extremities. no clubbing, cyanosis or edema   SKIN: On initial evaluation patient was pale throughout, no signs of perioral cyanosis. her fingers were pale and her toes were pale and she would open her eyes to sternal rub, her conjunctiva also had pallor. after oxygen her cheeks became pink and she had better color to her hands and feet. DIFFERENTIAL DIAGNOSIS/MDM:     Chest Pain:  DDX: Emergent: ACS/NSTEMI/STEMI/angina, arrhythmia, trauma, aortic dissection,  PE, PNA, pneumothroax, esophageal rupture, tamponade, Cocaine use  Nonemergent: pneumonia, pericarditis, GERD, MSK, Endocarditis, anxiety  Evaluated for: diaphoresis, present chest pain, tachypnea, BP both arms, heart sounds, JVD, tender chest wall, wheezing  Dehydration:  DDX: evaluate for dehydration prerenal (inadequate renal perfusion) d/t volume depletion, redistribution of existing volume, inadequate CO or meds (ACEI, NSAIDs)  Shortness of Breath:  DDX: sob/wheezing/cough evaluate for COPD exacerbation (home O2, PNA, hospitalization), asthma (past intubation, hospitalization, tobacco history), Pneumothorax, anaphylaxis, anxiety, PE (FH, OCP, tobacco use, BMI, immobilization, recent surgery, cancer, past DVT/ PE), pericardial effusion, CHF, ACS/MI, atelectasis, lower airway obstruction, aspiration, sudden onset, fever, cough, leg swelling. Patient with severe HIGINIO, requiring BiPAP at night with significant hypoxia.     DIAGNOSTIC RESULTS     EKG: All EKG's are interpreted by the Observation Physician who either signs or Co-signs this chart in the absence of a cardiologist.    EKG Interpretation    Interpreted by observation physician    Rhythm: sinus arrhythmia  Rate: normal  Axis: left  Ectopy: none  Conduction: normal  ST Segments: no acute change  T Waves: no acute change  Q Waves: none and nonspecific    Clinical Impression: no acute changes, non-specific EKG and sinus arrhythmia    Cait Albright MD        RADIOLOGY:   I directly visualized the following  images and reviewed the radiologist interpretations:    XR CHEST (2 VW)    Result Date: 10/5/2021  EXAMINATION: TWO XRAY VIEWS OF THE CHEST 10/5/2021 1:39 pm COMPARISON: April 30, 2021 HISTORY: ORDERING SYSTEM PROVIDED HISTORY: left sided chest pain x3 days, intermittent TECHNOLOGIST PROVIDED HISTORY: left sided chest pain x3 days, intermittent FINDINGS: Adequate inspiration is present. No focal area of consolidation or pneumothorax is noted. Heart size and mediastinal contours are stable. A ventricular loop recorder is in place. Osseous structures are stable. No evidence of acute cardiopulmonary disease       LABS:  I have reviewed and interpreted all available lab results.   Labs Reviewed   CBC WITH AUTO DIFFERENTIAL - Abnormal; Notable for the following components:       Result Value    Hematocrit 47.9 (*)     All other components within normal limits   COMPREHENSIVE METABOLIC PANEL W/ REFLEX TO MG FOR LOW K - Abnormal; Notable for the following components:    CREATININE 1.01 (*)     Chloride 96 (*)     ALT 39 (*)     AST 56 (*)     GFR Non- 54 (*)     All other components within normal limits   POC GLUCOSE FINGERSTICK - Abnormal; Notable for the following components:    POC Glucose 121 (*)     All other components within normal limits   COVID-19, RAPID   TROPONIN   BRAIN NATRIURETIC PEPTIDE   COMPREHENSIVE METABOLIC PANEL   LACTATE, SEPSIS   LACTATE, SEPSIS   TROPONIN   BRAIN NATRIURETIC PEPTIDE   CBC WITH AUTO DIFFERENTIAL   POC GLUCOSE FINGERSTICK       SCREENING TOOLS:    HEART Risk Score for Chest Pain Patients   History and Physical Exam Suspicion Level  (Nausea, Vomiting, Diaphoresis, Radiation, Exertion)   Slightly Suspicious (0 pts)   Moderately Suspicious (1 pt)   Highly Suspicious (2 pts)   EKG Interpretation   Normal (0 pts)   Non-Specific Repolarization Disturbance (1 pt)   Significant ST-Depression (2 pts)   Age of Patient (in years)   = 39 (0 pts)   46-64 (1 pt)   = 65 (2 pts)   Risk Factors   No Risk Factors (0 pts)   1-2 Risk Factors (1 pt)   = 3 Risk Factors (2 pts)   Risk Factors Include:   Hypercholesterolemia   Hypertension   Diabetes Mellitus   Cigarette smoking   Positive family history   Obesity   CAD   (SLE, CKDz, HIV, Cocaine abuse)   Troponin Levels   = Normal Limit (0 pts)   1-3 Times Normal Limit (1 pt)   > 3 Times Normal Limit (2 pts)  TOTAL:    Percent Risk for Major Adverse Cardiac Event (MACE)  0-3 pts indicates low risk for MACE   2.5% (DISCHARGE)   4-7 pts indicates moderate risk for MACE  20.3% (OBS)  8-10 pts indicates high risk for MACE  72.7% (EARLY INVASIVE TX)    CDU IMPRESSION / Sun Mcghee is a 69 y.o. female who presents with atypical chest pain. Patient with significant past medical history of morbid obesity with hypoventilation, type 2 diabetes on insulin with polyneuropathy, HIGINIO with significant hypoxia while sleeping, chronic pain on opiates at baseline managed outpatient, asthma with COPD, stage III CKD, hypertension. Cardiac work-up negative in the emergency department. Repeat CBC, CMP, troponin BNP EKG and chest x-ray without significant changes. Anticipate VBG with CO2 retention however this has been un able to be obtained due to clotting. Patient responded immediately to oxygen after being found minimally responsive and hypersomnolent on initial encounter today. Patient has had similar episodes while inpatient in the past per EMR chart review and has been transferred services due to similar scenario. Patient's blood pressure responded to IV saline boluses. Levophed was never started or given.   Patient transferred to internal medicine service stepdown. See transfer note. CONSULTS:     IP CONSULT TO RESPIRATORY CARE  IP CONSULT TO INTERNAL MEDICINE  IP CONSULT TO HOSPITALIST  IP CONSULT TO IV TEAM  IP CONSULT TO CRITICAL CARE    PROCEDURES:  Not indicated       PATIENT REFERRED TO:    No follow-up provider specified. --  Zaynab Valadez MD   Emergency Medicine Resident     This dictation was generated by voice recognition computer software. Although all attempts are made to edit the dictation for accuracy, there may be errors in the transcription that are not intended.

## 2021-10-06 NOTE — PROGRESS NOTES
Transfer note:  From observation unit - to Step-down ([facility] service)    On evaluation of patient this morning at 8:10am  She was extremely pale, minimally responsive, and about to fall out of bed while asleep. BiPAP machine at bedside, machine off as well as oxygen off on wall. Patient would only moan in response - patient was sat up in bed by nurse and resident and sternal rub would cause her to awaken her eyes however she would immediately go back to sleep. Stat vitals revealed BP 67/47 , SPO2 65% on room air. I ordered a verbal NS bolus, and EKG while I hooked patient up to 15L via nasal canula and placed her on Bipap. Respiratory was called by [facility]. STAT labs ordered as well. EKG without acute changes. Color returned to her face with bipap and nasal canula after 5 minutes, and she would awaken to voice at this time. 8:25am - respiratory to bedside. Her BP was initially responsive to only 500cc infusion at this point - 8:30am at this time. She was provided albuterol neb. Consult to [facility] placed at 9am    Patient blood pressure dropped to 80/60's after 1000cc. Critical care called, levophed ordered   Second bolus started infusing - pressures remained stable and patient deemed stable for step-down. CBC/CMP/Trop/BNP/LacticAcid and CXR - grossly unremarkable  VBG ordered clotted, 2ng VBG clotted as well. Working on obtaining VBG at this time    Plan for transfer to [facility] now. Step-down. Needs VBG follow up - I believe patient is retaining CO2.     Mario Oconnor MD  10/06/21  Emergency medicine  Observation unit

## 2021-10-06 NOTE — PROGRESS NOTES
[address]  CDU / OBSERVATION ENCOUNTER  ATTENDING NOTE       I performed a history and physical examination of the patient and discussed management with the resident or midlevel provider. I reviewed the resident or midlevel provider's note and agree with the documented findings and plan of care. Any areas of disagreement are noted on the chart. I was personally present for the key portions of any procedures. I have documented in the chart those procedures where I was not present during the key portions. I have reviewed the nurses notes. I agree with the chief complaint, past medical history, past surgical history, allergies, medications, social and family history as documented unless otherwise noted below. The Family history, social history, and ROS are effectively unchanged since admission unless noted elsewhere in the chart. Patient was somnolent this morning and hypotensive. Patient was seen by the resident and was fluid resuscitated. Patient had been with atypical chest pain. Patient was not able to give much history herself as she was somnolent. Patient did wake to voice and answer questions appropriately but fell asleep immediately afterwards. Patient was to be on BiPAP overnight but had not been. Patient had work-up initiated which included chest x-ray. Laboratory work and chest x-ray effectively negative. Patient was not felt appropriate for the observation unit and [facility] was called. Patient to be transferred to stepdown unit. Bedside signout done with [facility] attending. Awaiting VBG. We have had 2 specimens clot. Patient has apparent CO2 retention. Medication list reviewed. No clear evidence of sedating medications as causative of current state.     Shelley Blevins MD  Attending Emergency  Physician

## 2021-10-06 NOTE — PROGRESS NOTES
Spo2 in 70's on bipap. Respiratory therapy called and will be up. Bipap increased to 100%. Tran Love and charge nurse notified. Patient alert.

## 2021-10-06 NOTE — H&P
[facility]  Office: [address], DO, Leah Burrows, DO, Yaneth Kohler, DO, David Gaines Blood, DO, Mela Copeland MD, J Carlos Ruelas MD, Kayla Abreu MD, Chris Knapp MD, Reema Sellers MD, Garret Shultz MD, Richi Vargas MD, Briana Curry, DO, Margy Correa, DO, Ray Capps MD,  Lazaro Moy, DO, Chaitanya Ojeda MD, Martha Temple MD, Bhupinder Robbins MD, Jesus Manuel Freed MD, , Caitlin Hernandez MD, Allie Kenyon MD, Priya Del Rio MD, Alferd Felty, CNP, [address], [facility], Ron Mendoza, CNP, Elaine Guajardo, CNS, Neri Viramontes, CNP, Anjelica Horn, CNP, Susie Marrero, CNP, Dee Israel, CNP, Shellie Chiu, [facility], Vipul Drummond PA-C, Mel Navarro, [facility], Christopher Mercado, CNP, Matt Mercedes, CNP, Marcial Muhammad, CNP, Eyad Tamayo, CNP, Oz Briggs, CNP, Skylar Cole, CNP, Yovana Bernard, CNP, Nura Castillo CNP         IN-PATIENT SERVICE  History and Physical  [address] [facility]          Name: Enrico Vogt  MRN: [id number]     Acct: [de-identified]  Room: [address] [place] [address]    Admit Date: 10/5/2021  PCP: Josh Bedolla MD    Chief Complaint:  Chief Complaint   Patient presents with    Chest Pain     x1 wk     The patient has been evaluated at the request of the observation service for admission     History of Present Illness:  Enrico Vogt is a 69 y.o.  female who presents with Chest Pain (x1 wk)    Patient was admitted thru ER to be Observation unit with:  Enrico Vogt is a  69 y.o. female with extensive past medical history including diabetes, CHF, COPD, CKD, and TIA on home oxygen who presents to the ED for 3 days of intermittent \"electrical\" shooting pain in her left chest that radiates to her back under a scapula.     It lasts for approx 20 seconds and resolves to 0. Occurred a few times in the past 2 days, twice today, the 2nd time greater in intensity and causing pain into her left arm, which is what brought her to the department to be evaluated. COLONOSCOPY      COLONOSCOPY  04/10/2017    tubular adenomo polyp , mod. sigmoid diverticulosis, min. int. hemorrhoids    COLONOSCOPY N/A 3/2/2021    COLONOSCOPY WITH BIOPSY performed by Romie Dorsey MD at [facility] 8  9/25/2013    DILATION AND CURETTAGE  1979    EYE SURGERY      laser    INCONTINENCE SURGERY      Percutaneous nerve stimulation Dr Renetta Farley Nov 2013     INSERTABLE CARDIAC MONITOR  12/04/2015    [employer] REVEAL LINQ MODEL #[id number] MRI CONDTIONAL OK 3T. IMMEDIATELY POST IMPLANT    OTHER SURGICAL HISTORY  09/10/15    removal of interstim    MN COLSC FLX W/REMOVAL LESION BY HOT BX FORCEPS N/A 4/10/2017    COLONOSCOPY POLYPECTOMY HOT BIOPSY performed by Jennifer Ram MD at [address] TYMPANOPLASTY      TYMPANOPLASTY      TYMPANOSTOMY TUBE PLACEMENT  08/21/2017    UPPER GASTROINTESTINAL ENDOSCOPY  03/2016    Dr Humberto Cat N/A 3/2/2021    EGD BIOPSY performed by Romie Dorsey MD at [facility]:  Prior to Admission medications    Medication Sig Start Date End Date Taking? Authorizing Provider   metFORMIN (GLUCOPHAGE) 1000 MG tablet TAKE 1 TAB BY MOUTH TWICE A DAY ( IN THE MORNING AND BEDTIME )  10/1/21  Yes Yariel Fletcher MD   oxyCODONE-acetaminophen (PERCOCET) 5-325 MG per tablet Take 1 tablet by mouth See Admin Instructions for 30 days. Intended supply: 30 days.  1 tab 3-4 times a day prn 10/1/21 10/31/21 Yes VERONIKA Blake - CNP   insulin glargine (LANTUS SOLOSTAR) 100 UNIT/ML injection pen Inject 42 Units into the skin nightly  Patient taking differently: Inject 50 Units into the skin 2 times daily  9/27/21  Yes Yariel Fletcher MD   blood glucose test strips (ONETOUCH ULTRA) strip TEST TWO (2) TO THREE (3) TIMES A DAY & AS NEEDED FOR SYMPTOMS OF IRREGULAR BLOOD GLUCOSE 8/23/21  Yes Yariel Fletcher MD   blood glucose test strips (ONETOUCH ULTRA) strip TEST TWO (2) TO THREE (3) TIMES A DAY & AS NEEDED FOR SYMPTOMS OF IRREGULAR BLOOD GLUCOSE 8/20/21  Yes Janet Carpenter MD   ASPIRIN LOW DOSE 81 MG EC tablet TAKE 1 TAB BY MOUTH ONCE A DAY  4/23/21  Yes Janet Carpenter MD   Blood Glucose Monitoring Suppl (ONE TOUCH ULTRA 2) w/Device KIT  2/10/21  Yes Historical Provider, MD   ONETOUCH ULTRA strip TEST TWO (2) TO THREE (3) TIMES A DAY& AS NEEDED  3/5/21  Yes Janet Carpenter MD   insulin aspart (NOVOLOG FLEXPEN) 100 UNIT/ML injection pen IF <139 - NO INSULIN; 140-199-2 UNITS;200-249-4 UNITS;250-299-6 UNITS;300-349-8 UNITS;350-400=10 UNITS;ABOVE 400-12 UNITS 3/3/21  Yes Janet Carpenter MD   atorvastatin (LIPITOR) 40 MG tablet Take 1 tablet by mouth daily 2/28/21  Yes Janet Carpenter MD   Multiple Vitamins-Minerals (THERAPEUTIC MULTIVITAMIN-MINERALS) tablet Take 1 tablet by mouth daily Takes Women over 50 Complete Vitamin daily ([employer] brand)   Yes Historical Provider, MD   BiPAP Machine MISC repair/replace Bipap maintain 18/9CMH2O, Cflex=3,mask,tubing,filters,headgear,heated humidifier,all supplies PRN 1/28/21  Yes Historical Provider, MD   Psyllium (METAMUCIL PO) Take by mouth 3 times daily Takes powder   Yes Historical Provider, MD   blood glucose test strips (TRUE METRIX BLOOD GLUCOSE TEST) strip THREE TIMES A DAY 1/21/21  Yes Janet Carpenter MD   ferrous sulfate (IRON 325) 325 (65 Fe) MG tablet TAKE 1 TAB BY MOUTH EVERY MORNING  1/15/21  Yes Janet Carpenter MD   citalopram (CELEXA) 40 MG tablet TAKE 1 2 TABLET BY MOUTH TWICE A DAY  Patient taking differently: Take 20 mg by mouth 2 times daily  1/15/21  Yes Janet Carpenter MD   carvedilol (COREG) 3.125 MG tablet TAKE 1 TAB BY MOUTH TWICE A DAY ( IN THE MORNING AND BEDTIME )  1/15/21  Yes Janet Carpenter MD   losartan (COZAAR) 25 MG tablet TAKE 1 TAB BY MOUTH ONCE A DAY ( IN THE MORNING ) 1/15/21  Yes Janet Carpenter MD   magnesium oxide (MAG-OX) 400 MG tablet TAKE 1 TAB BY MOUTH TWICE A DAY ( IN THE MORNING AND BEDTIME ) 1/15/21  Yes Janet Carpenter MD   topiramate (TOPAMAX) 100 MG tablet Take 1 tablet by mouth nightly 11/30/20  Yes Zachary Doss MD   dicyclomine (BENTYL) 10 MG capsule Take 1 capsule by mouth 2 times daily as needed (abdominal spasm) 11/25/20  Yes Mahesh Yeboah MD   albuterol (PROVENTIL) (2.5 MG/3ML) 0.083% nebulizer solution Take 3 mLs by nebulization every 6 hours as needed for Wheezing 11/3/20  Yes Mateusz Coleman MD   Icosapent Ethyl (VASCEPA) 1 g CAPS capsule Take 1 capsule by mouth 2 times daily 9/14/20  Yes Mahesh Yeboah MD   vitamin B-12 (CYANOCOBALAMIN) 100 MCG tablet Take 50 mcg by mouth daily   Yes Historical Provider, MD   ULTICARE MINI PEN NEEDLES 31G X 6 MM MISC USE AS DIRECTED  8/14/20  Yes Mahesh Yeboah MD   gabapentin (NEURONTIN) 300 MG capsule Take 1 capsule by mouth 3 times daily for 30 days. Patient taking differently: Take 300 mg by mouth 2 times daily. 7/30/20 10/5/21 Yes VERONIKA Pratt - CNP   Lift Chair MISC by Does not apply route 9/24/19  Yes Mahesh Yeboah MD   Misc.  Devices (ADJUST BATH/SHOWER SEAT/BACK) MISC Use daily for shower 9/24/19  Yes Mahesh Yeboah MD   pantoprazole (PROTONIX) 40 MG tablet Take 1 tablet by mouth 2 times daily 3/15/19  Yes Para Stager, DO   Alcohol Swabs (B-D SINGLE USE SWABS REGULAR) PADS THREE TIMES A DAY 12/12/18  Yes Mahesh Yeboah MD   nitroGLYCERIN (NITROSTAT) 0.4 MG SL tablet 1 under the tongue as needed for angina, may repeat q5mins for up three doses 8/28/18  Yes Historical Provider, MD   Blood Glucose Monitoring Suppl HARMEET Use daily to check BS 3/27/18  Yes Mahesh Yeboah MD   ipratropium-albuterol (DUONEB) 0.5-2.5 (3) MG/3ML SOLN nebulizer solution Inhale 3 mLs into the lungs every 4 hours 2/6/18  Yes Jayro Boswell MD   Melatonin 10 MG TABS Take 10 mg by mouth nightly 10/19/17  Yes Mahesh Yeboah MD   docusate sodium (COLACE) 100 MG capsule Take 1 capsule by mouth 2 times daily Please use while taking Percocet 9/10/15  Yes Maria Luz Cain MD   SYMBICORT 160-4.5 MCG/ACT AERO 2 puffs As needed for sob 5/28/15  Yes Historical Provider, MD   OXYGEN Inhale 3 L into the lungs    Yes Historical Provider, MD   ibuprofen (ADVIL;MOTRIN) 800 MG tablet Take 1 tablet by mouth every 8 hours as needed for Pain 4/30/21 5/26/21  Asuncion Vernon MD   clopidogrel (PLAVIX) 75 MG tablet TAKE 1 TAB BY MOUTH ONCE A DAY ( IN THE MORNING ) -THIS MEDICINE MAY BE TAKENWITH OR WITHOUT FOOD  4/14/21   Mendel Gola, MD   furosemide (LASIX) 20 MG tablet TAKE 1 TAB BY MOUTH ONCE A DAY AS NEEDED ( WEIGHT GAIN 3 LBS OVERNIGHT )  10/31/20   Mendel Gola, MD   isosorbide dinitrate (ISORDIL) 30 MG tablet Take 30 mg by mouth    Historical Provider, MD   nystatin (MYCOSTATIN) 725502 UNIT/GM powder Apply 3 times daily. 6/25/20   Mendel Gola, MD   lidocaine (LMX) 4 % cream Apply topically every 8 hrs as needed for pain 11/11/19   Mendel Gola, MD   Compression Stockings MISC by Does not apply route Pressure between 20 - 25 . 9/11/17   Mendel Gola, MD         Allergies:   Robitussin [guaifenesin], Codeine, Compazine [prochlorperazine maleate], Iodides, Morphine, Moxifloxacin, Reglan [metoclopramide], Avelox [moxifloxacin hcl in nacl], Cipro xr, and Sulfa antibiotics    Social Hx:  Tobacco:    reports that she quit smoking about 21 years ago. Her smoking use included cigarettes. She has a 123.00 pack-year smoking history. She has never used smokeless tobacco.  Alcohol:      reports no history of alcohol use. Drug Use:  reports no history of drug use. Family Hx; Family History   Problem Relation Age of Onset    Diabetes Mother     Heart Disease Mother     Stomach Cancer Father     Diabetes Maternal Grandmother         also aunts and uncles (maternal)    Emphysema Sister        ROS:  Limited, patient rather somnolent  Review of Systems   Constitutional: Positive for activity change (Diminished) and fatigue. Negative for appetite change, chills, diaphoresis and fever.    HENT: Negative for congestion and nosebleeds. Eyes: Negative for photophobia and visual disturbance. Respiratory: Positive for apnea (She is back on BiPAP), cough (Occasional white sputum) and shortness of breath (Dyspnea on exertion). Negative for wheezing. Cardiovascular: Negative for chest pain and palpitations. Gastrointestinal: Negative for abdominal distention, blood in stool, nausea and vomiting. Genitourinary: Negative for flank pain and hematuria. Musculoskeletal: Positive for back pain (Chronic back pain) and gait problem. Negative for arthralgias and myalgias. Skin: Negative for rash and wound. Neurological: Negative for dizziness and light-headedness. Physical Exam:  Vitals:  BP (!) 103/54   Pulse 78   Temp 97.2 °F (36.2 °C) (Oral)   Resp 16   Ht 5' 2\" (1.575 m)   Wt 250 lb (113.4 kg)   LMP  (LMP Unknown)   SpO2 95%   BMI 45.73 kg/m²   Temp (24hrs), Av.6 °F (36.4 °C), Min:97 °F (36.1 °C), Max:98.5 °F (36.9 °C)    Physical Exam  Vitals reviewed. Constitutional:       General: She is not in acute distress. Appearance: She is not diaphoretic. HENT:      Head: Normocephalic. Nose: Nose normal.   Eyes:      General: No scleral icterus. Conjunctiva/sclera: Conjunctivae normal.   Neck:      Trachea: No tracheal deviation. Cardiovascular:      Rate and Rhythm: Normal rate and regular rhythm. Pulmonary:      Effort: Pulmonary effort is normal. No respiratory distress. Breath sounds: Normal breath sounds. No wheezing or rales. Chest:      Chest wall: No tenderness. Abdominal:      General: Bowel sounds are normal. There is no distension. Palpations: Abdomen is soft. Tenderness: There is no abdominal tenderness. Musculoskeletal:         General: No tenderness. Cervical back: Neck supple. Skin:     General: Skin is warm and dry.    Neurological:      Comments: Somnolent  Awakens  Having difficulty with historical data  Moving extremities x4  No gross motor or sensory deficits  Following commands   equal            Data:  Laboratory Testing:  Recent Results (from the past 24 hour(s))   EKG 12 Lead    Collection Time: 10/05/21  6:22 PM   Result Value Ref Range    Ventricular Rate 70 BPM    Atrial Rate 70 BPM    P-R Interval 146 ms    QRS Duration 60 ms    Q-T Interval 346 ms    QTc Calculation (Bazett) 373 ms    P Axis 55 degrees    R Axis -53 degrees    T Axis 90 degrees   CBC Auto Differential    Collection Time: 10/05/21  6:52 PM   Result Value Ref Range    WBC 7.2 3.5 - 11.3 k/uL    RBC 4.84 3.95 - 5.11 m/uL    Hemoglobin 14.4 11.9 - 15.1 g/dL    Hematocrit 47.9 (H) 36.3 - 47.1 %    MCV 99.0 82.6 - 102.9 fL    MCH 29.8 25.2 - 33.5 pg    MCHC 30.1 28.4 - 34.8 g/dL    RDW 12.7 11.8 - 14.4 %    Platelets 415 426 - 431 k/uL    MPV 11.5 8.1 - 13.5 fL    NRBC Automated 0.0 0.0 per 100 WBC    Differential Type NOT REPORTED     Seg Neutrophils 55 36 - 65 %    Lymphocytes 30 24 - 43 %    Monocytes 10 3 - 12 %    Eosinophils % 4 1 - 4 %    Basophils 1 0 - 2 %    Immature Granulocytes 0 0 %    Segs Absolute 4.03 1.50 - 8.10 k/uL    Absolute Lymph # 2.13 1.10 - 3.70 k/uL    Absolute Mono # 0.69 0.10 - 1.20 k/uL    Absolute Eos # 0.27 0.00 - 0.44 k/uL    Basophils Absolute 0.07 0.00 - 0.20 k/uL    Absolute Immature Granulocyte 0.03 0.00 - 0.30 k/uL    WBC Morphology NOT REPORTED     RBC Morphology NOT REPORTED     Platelet Estimate NOT REPORTED    Comprehensive Metabolic Panel w/ Reflex to MG    Collection Time: 10/05/21  6:52 PM   Result Value Ref Range    Glucose 84 70 - 99 mg/dL    BUN 17 8 - 23 mg/dL    CREATININE 1.01 (H) 0.50 - 0.90 mg/dL    Bun/Cre Ratio NOT REPORTED 9 - 20    Calcium 9.7 8.6 - 10.4 mg/dL    Sodium 136 135 - 144 mmol/L    Potassium 4.2 3.7 - 5.3 mmol/L    Chloride 96 (L) 98 - 107 mmol/L    CO2 27 20 - 31 mmol/L    Anion Gap 13 9 - 17 mmol/L    Alkaline Phosphatase 84 35 - 104 U/L    ALT 39 (H) 5 - 33 U/L    AST 56 (H) <32 U/L    Total Bilirubin 0.39 0.3 - 1.2 mg/dL    Total Protein 7.4 6.4 - 8.3 g/dL    Albumin 3.9 3.5 - 5.2 g/dL    Albumin/Globulin Ratio 1.1 1.0 - 2.5    GFR Non- 54 (L) >60 mL/min    GFR African American >60 >60 mL/min    GFR Comment          GFR Staging NOT REPORTED    Troponin    Collection Time: 10/05/21  6:52 PM   Result Value Ref Range    Troponin, High Sensitivity 10 0 - 14 ng/L    Troponin T NOT REPORTED <0.03 ng/mL    Troponin Interp NOT REPORTED    Brain Natriuretic Peptide    Collection Time: 10/05/21  6:52 PM   Result Value Ref Range    Pro- <300 pg/mL    BNP Interpretation Pro-BNP Reference Range:    COVID-19, Rapid    Collection Time: 10/05/21  8:30 PM    Specimen: Nasopharyngeal Swab   Result Value Ref Range    Specimen Description . NASOPHARYNGEAL SWAB     SARS-CoV-2, Rapid Not Detected Not Detected   POC Glucose Fingerstick    Collection Time: 10/05/21 11:21 PM   Result Value Ref Range    POC Glucose 121 (H) 65 - 105 mg/dL   POC Glucose Fingerstick    Collection Time: 10/06/21  7:44 AM   Result Value Ref Range    POC Glucose 96 65 - 105 mg/dL   EKG 12 Lead    Collection Time: 10/06/21  8:26 AM   Result Value Ref Range    Ventricular Rate 82 BPM    Atrial Rate 82 BPM    P-R Interval 154 ms    QRS Duration 90 ms    Q-T Interval 376 ms    QTc Calculation (Bazett) 439 ms    P Axis 68 degrees    R Axis -38 degrees    T Axis 76 degrees   COMPREHENSIVE METABOLIC PANEL    Collection Time: 10/06/21  8:47 AM   Result Value Ref Range    Glucose 106 (H) 70 - 99 mg/dL    BUN 22 8 - 23 mg/dL    CREATININE 1.35 (H) 0.50 - 0.90 mg/dL    Bun/Cre Ratio NOT REPORTED 9 - 20    Calcium 9.5 8.6 - 10.4 mg/dL    Sodium 135 135 - 144 mmol/L    Potassium 4.9 3.7 - 5.3 mmol/L    Chloride 98 98 - 107 mmol/L    CO2 26 20 - 31 mmol/L    Anion Gap 11 9 - 17 mmol/L    Alkaline Phosphatase 80 35 - 104 U/L    ALT 34 (H) 5 - 33 U/L    AST 48 (H) <32 U/L    Total Bilirubin 0.27 (L) 0.3 - 1.2 mg/dL    Total Protein 7.0 6.4 - 8.3 g/dL    Albumin 3.6 3.5 - 5.2 g/dL    Albumin/Globulin Ratio 1.1 1.0 - 2.5    GFR Non- 39 (L) >60 mL/min    GFR  47 (L) >60 mL/min    GFR Comment          GFR Staging NOT REPORTED    Lactate, Sepsis    Collection Time: 10/06/21  8:47 AM   Result Value Ref Range    Lactic Acid, Sepsis NOT REPORTED 0.5 - 1.9 mmol/L    Lactic Acid, Sepsis, Whole Blood 1.8 0.5 - 1.9 mmol/L   Troponin    Collection Time: 10/06/21  8:47 AM   Result Value Ref Range    Troponin, High Sensitivity 10 0 - 14 ng/L    Troponin T NOT REPORTED <0.03 ng/mL    Troponin Interp NOT REPORTED    Brain Natriuretic Peptide    Collection Time: 10/06/21  8:47 AM   Result Value Ref Range    Pro- <300 pg/mL    BNP Interpretation Pro-BNP Reference Range:    CBC Auto Differential    Collection Time: 10/06/21  8:53 AM   Result Value Ref Range    WBC 10.4 3.5 - 11.3 k/uL    RBC 4.54 3.95 - 5.11 m/uL    Hemoglobin 14.0 11.9 - 15.1 g/dL    Hematocrit 44.7 36.3 - 47.1 %    MCV 98.5 82.6 - 102.9 fL    MCH 30.8 25.2 - 33.5 pg    MCHC 31.3 28.4 - 34.8 g/dL    RDW 12.7 11.8 - 14.4 %    Platelets 941 595 - 693 k/uL    MPV 11.9 8.1 - 13.5 fL    NRBC Automated 0.0 0.0 per 100 WBC    Differential Type NOT REPORTED     Seg Neutrophils 70 (H) 36 - 65 %    Lymphocytes 17 (L) 24 - 43 %    Monocytes 8 3 - 12 %    Eosinophils % 3 1 - 4 %    Basophils 1 0 - 2 %    Immature Granulocytes 1 (H) 0 %    Segs Absolute 7.33 1.50 - 8.10 k/uL    Absolute Lymph # 1.79 1.10 - 3.70 k/uL    Absolute Mono # 0.82 0.10 - 1.20 k/uL    Absolute Eos # 0.27 0.00 - 0.44 k/uL    Basophils Absolute 0.08 0.00 - 0.20 k/uL    Absolute Immature Granulocyte 0.15 0.00 - 0.30 k/uL    WBC Morphology NOT REPORTED     RBC Morphology NOT REPORTED     Platelet Estimate NOT REPORTED    SPECIMEN REJECTION    Collection Time: 10/06/21 10:03 AM   Result Value Ref Range    Specimen Source . BLOOD     Ordered Test VBG     Reason for Rejection Unable to perform testing: Specimen clotted.      - NOT REPORTED Blood Gas, Venous    Collection Time: 10/06/21 11:14 AM   Result Value Ref Range    pH, Onel  7.320 - 7.420     DISREGARD RESULTS, DELAY IN SAMPLE TESTING RN RITU W NOTIFIED    pCO2, Onel DISREGARD RESULTS, DELAY IN SAMPLE TESTING 39 - 55    pO2, Onel DISREGARD RESULTS, DELAY IN SAMPLE TESTING 30 - 50    HCO3, Venous DISREGARD RESULTS, DELAY IN SAMPLE TESTING 24 - 30 mmol/L    Positive Base Excess, Onel NOT REPORTED 0.0 - 2.0 mmol/L    Negative Base Excess, Onel DISREGARD RESULTS, DELAY IN SAMPLE TESTING 0.0 - 2.0 mmol/L    O2 Sat, Onel DISREGARD RESULTS, DELAY IN SAMPLE TESTING 60.0 - 85.0 %    Total Hb NOT REPORTED 12.0 - 16.0 g/dl    Oxyhemoglobin NOT REPORTED 95.0 - 98.0 %    Carboxyhemoglobin NOT REPORTED 0 - 5 %    Methemoglobin NOT REPORTED 0.0 - 1.5 %    Pt Temp DISREGARD RESULTS, DELAY IN SAMPLE TESTING     pH, Onel, Temp Adj NOT REPORTED 7.320 - 7.420    pCO2, Onel, Temp Adj NOT REPORTED 39 - 55 mmHg    pO2, Onel, Temp Adj NOT REPORTED 30 - 50 mmHg    O2 Device/Flow/% NOT REPORTED     Respiratory Rate NOT REPORTED     Dom Test NOT REPORTED     Sample Site NOT REPORTED     Pt. Position NOT REPORTED     Mode NOT REPORTED     Set Rate NOT REPORTED     Total Rate NOT REPORTED     VT NOT REPORTED     FIO2 INFORMATION NOT PROVIDED     Peep/Cpap NOT REPORTED     PSV NOT REPORTED     Text for Respiratory NOT REPORTED     NOTIFICATION NOT REPORTED     NOTIFICATION TIME NOT REPORTED    POC Glucose Fingerstick    Collection Time: 10/06/21 11:14 AM   Result Value Ref Range    POC Glucose 100 65 - 105 mg/dL       Imaging/Diagnostics:  XR CHEST (2 VW)    Result Date: 10/5/2021  No evidence of acute cardiopulmonary disease     XR CHEST PORTABLE    Result Date: 10/6/2021  New left infrahilar opacity, which may be related to atelectasis or small infiltrate. The pulmonary vasculature appears unremarkable.        Assessment:  Primary Problem  Acute respiratory failure with hypoxia and hypercarbia [facility])    Active Hospital Problems Diagnosis Date Noted    Type 2 diabetes mellitus with diabetic polyneuropathy, with long-term current use of insulin ([facility] [E11.42, Z79.4]      Priority: High    Hypotension [I95.9] 10/06/2021    Hypoxia [R09.02] 10/06/2021    Left ventricular diastolic dysfunction [R82.2] 10/06/2021    Transaminasemia [R74.01] 10/06/2021    Acute respiratory failure with hypoxia and hypercarbia (HCC) [J96.01, J96.02] 10/06/2021    Atypical chest pain [R07.89] 10/05/2021    Chest pain [R07.9] 04/05/2021    Morbid obesity ([facility] [E66.01] 01/26/2021    Obesity, Class III, BMI 40-49.9 (morbid obesity) ([facility] [E66.01] 07/16/2018    Asthma with COPD ([facility] [J44.9] 07/16/2018    YAJAIRA (acute kidney injury) ([facility] [N17.9] 02/01/2018    Obstructive sleep apnea syndrome [G47.33] 02/01/2018    Morbid (severe) obesity with alveolar hypoventilation (HCC) [E66.2] 12/07/2016    GERD (gastroesophageal reflux disease) [K21.9] 06/28/2014    HTN (hypertension), benign [I10] 06/28/2014       Plan:  Admit:  May need ICU   Pulmonary/critical care consult for respiratory failure  Cardiology evaluation in progress for atypical chest pain  Optimize cardio-pulmonary function  Respiratory Therapy and Bronchodilators prn   Encourage BiPAP use  Oxygen supplementation as needed  Avoid respiratory depressants  Glycemic contol - monitor and control blood sugars  Blood Pressure - Monitor and control  Observe for symptomatic hypotension  -Coreg on hold  -Losartan on hold  Reflux precautions  Risk factor management / weight loss   YAJAIRA: Check bun and creatinine  Trend LFTs  DVT prophylaxis      Patient is admitted as inpatient status because of co-morbidities listed above, severity of signs and symptoms as outlined, requirement for current medical therapies and most importantly because of direct risk to patient if care not provided in a hospital setting. Expected length of stay > 48 hours.      Consultations:   IP CONSULT TO RESPIRATORY CARE  IP CONSULT TO INTERNAL MEDICINE  IP CONSULT TO HOSPITALIST  IP CONSULT TO IV TEAM  IP CONSULT TO CRITICAL CARE  IP CONSULT TO CARDIOLOGY    Electronically signed by Luis Angel Pollack DO on 10/6/2021 at 12:25 PM

## 2021-10-06 NOTE — PROGRESS NOTES
[facility]  Documentation Note                Admission Dx: Other chest pain [R07.89]  Nausea [R11.0]  Atypical chest pain [R07.89]    Past Medical History:   has a past medical history of Abdominal bloating, Adenomatous polyp of ascending colon, Allergic rhinitis, Anxiety, Arthritis, Asthma, Atrial fibrillation (HCC), Back pain, Benign hypertension with CKD (chronic kidney disease) stage III ([facility] CAD (coronary artery disease), Caffeine use, CHF (congestive heart failure) ([address] Chronic kidney disease, CKD (chronic kidney disease) stage 3, GFR 30-59 ml/min ([facility]), COPD (chronic obstructive pulmonary disease) ([facility] Degeneration of lumbar or lumbosacral intervertebral disc, Depression, DM (diabetes mellitus) ([facility] Emphysema of lung ([facility] Gastritis, GERD (gastroesophageal reflux disease), Hearing loss, Hematuria, Hiatal hernia, HTN (hypertension), benign, Hypertriglyceridemia, Incontinence of urine, Irritable bowel syndrome with both constipation and diarrhea, Knee arthropathy, Lumbosacral spondylosis without myelopathy, Migraine headache, Mumps, Neuropathy, Obesity, On home oxygen therapy, HIGINIO on CPAP, Otitis media, Secondary diabetes mellitus with stage 3 chronic kidney disease (GFR 30-59) ([facility] Stroke ([facility] Tinnitus, Type 2 diabetes mellitus without complication ([facility] Type II or unspecified type diabetes mellitus without mention of complication, not stated as uncontrolled, UTI (lower urinary tract infection), and Varicose vein. Previous Testing:     STRESS 4/6/2021: No ischemia/infarct. EF 70%. ECHO 2/3/2020: EF 66%, grade I DD.      CATH 2012: 60% small 1st diagonal branch disease of LAD on cardiac catheterization 2012, Normal LVEF 70%    Previous office/hospital visit:   Dr. Los Yusuf 4/6/2021:   Atypical CP  Has risk factors  Had long discussion regarding options (watchful waiting, outpatient stress testing, inpatient stress testing)- patient only wants inpatient stress testing. -will check Lexiscan stress test to rule out ischemia/further risk stratify   -If stress test is negative or low risk plan for d/c home today from cardiac standpoint and follow up with cardiology in 2 weeks.      Aleksandra Fernandez, [facility]

## 2021-10-07 ENCOUNTER — APPOINTMENT (OUTPATIENT)
Dept: GENERAL RADIOLOGY | Age: 71
DRG: 189 | End: 2021-10-07
Payer: COMMERCIAL

## 2021-10-07 PROBLEM — D75.89 MACROCYTOSIS: Status: ACTIVE | Noted: 2021-10-07

## 2021-10-07 LAB
GLUCOSE BLD-MCNC: 126 MG/DL (ref 65–105)
GLUCOSE BLD-MCNC: 157 MG/DL (ref 65–105)
GLUCOSE BLD-MCNC: 192 MG/DL (ref 65–105)
GLUCOSE BLD-MCNC: 44 MG/DL (ref 65–105)
GLUCOSE BLD-MCNC: 89 MG/DL (ref 65–105)
HCT VFR BLD CALC: 49 % (ref 36.3–47.1)
HEMOGLOBIN: 13.8 G/DL (ref 11.9–15.1)
LV EF: 55 %
LVEF MODALITY: NORMAL
MCH RBC QN AUTO: 30.7 PG (ref 25.2–33.5)
MCHC RBC AUTO-ENTMCNC: 28.2 G/DL (ref 28.4–34.8)
MCV RBC AUTO: 108.9 FL (ref 82.6–102.9)
NRBC AUTOMATED: 0 PER 100 WBC
PDW BLD-RTO: 12.5 % (ref 11.8–14.4)
PLATELET # BLD: 141 K/UL (ref 138–453)
PMV BLD AUTO: 12.2 FL (ref 8.1–13.5)
RBC # BLD: 4.5 M/UL (ref 3.95–5.11)
WBC # BLD: 9 K/UL (ref 3.5–11.3)

## 2021-10-07 PROCEDURE — 85027 COMPLETE CBC AUTOMATED: CPT

## 2021-10-07 PROCEDURE — 2060000000 HC ICU INTERMEDIATE R&B

## 2021-10-07 PROCEDURE — 2580000003 HC RX 258: Performed by: HEALTH CARE PROVIDER

## 2021-10-07 PROCEDURE — 71045 X-RAY EXAM CHEST 1 VIEW: CPT

## 2021-10-07 PROCEDURE — 93306 TTE W/DOPPLER COMPLETE: CPT

## 2021-10-07 PROCEDURE — 94761 N-INVAS EAR/PLS OXIMETRY MLT: CPT

## 2021-10-07 PROCEDURE — 36415 COLL VENOUS BLD VENIPUNCTURE: CPT

## 2021-10-07 PROCEDURE — 6370000000 HC RX 637 (ALT 250 FOR IP): Performed by: HEALTH CARE PROVIDER

## 2021-10-07 PROCEDURE — 6360000002 HC RX W HCPCS: Performed by: HEALTH CARE PROVIDER

## 2021-10-07 PROCEDURE — 82947 ASSAY GLUCOSE BLOOD QUANT: CPT

## 2021-10-07 PROCEDURE — 94660 CPAP INITIATION&MGMT: CPT

## 2021-10-07 PROCEDURE — 99232 SBSQ HOSP IP/OBS MODERATE 35: CPT | Performed by: INTERNAL MEDICINE

## 2021-10-07 PROCEDURE — 94640 AIRWAY INHALATION TREATMENT: CPT

## 2021-10-07 PROCEDURE — 2700000000 HC OXYGEN THERAPY PER DAY

## 2021-10-07 RX ADMIN — CITALOPRAM 20 MG: 20 TABLET, FILM COATED ORAL at 21:39

## 2021-10-07 RX ADMIN — GABAPENTIN 300 MG: 300 CAPSULE ORAL at 08:33

## 2021-10-07 RX ADMIN — CITALOPRAM 20 MG: 20 TABLET, FILM COATED ORAL at 08:33

## 2021-10-07 RX ADMIN — IPRATROPIUM BROMIDE AND ALBUTEROL SULFATE 3 ML: .5; 2.5 SOLUTION RESPIRATORY (INHALATION) at 01:31

## 2021-10-07 RX ADMIN — SODIUM CHLORIDE, PRESERVATIVE FREE 10 ML: 5 INJECTION INTRAVENOUS at 21:44

## 2021-10-07 RX ADMIN — MAGNESIUM GLUCONATE 500 MG ORAL TABLET 400 MG: 500 TABLET ORAL at 08:33

## 2021-10-07 RX ADMIN — SODIUM CHLORIDE 25 ML: 9 INJECTION, SOLUTION INTRAVENOUS at 05:51

## 2021-10-07 RX ADMIN — BUDESONIDE AND FORMOTEROL FUMARATE DIHYDRATE 2 PUFF: 160; 4.5 AEROSOL RESPIRATORY (INHALATION) at 08:17

## 2021-10-07 RX ADMIN — INSULIN LISPRO 1 UNITS: 100 INJECTION, SOLUTION INTRAVENOUS; SUBCUTANEOUS at 21:46

## 2021-10-07 RX ADMIN — IPRATROPIUM BROMIDE AND ALBUTEROL SULFATE 3 ML: .5; 2.5 SOLUTION RESPIRATORY (INHALATION) at 08:16

## 2021-10-07 RX ADMIN — IPRATROPIUM BROMIDE AND ALBUTEROL SULFATE 3 ML: .5; 2.5 SOLUTION RESPIRATORY (INHALATION) at 15:43

## 2021-10-07 RX ADMIN — TOPIRAMATE 100 MG: 100 TABLET, FILM COATED ORAL at 21:46

## 2021-10-07 RX ADMIN — IPRATROPIUM BROMIDE AND ALBUTEROL SULFATE 3 ML: .5; 2.5 SOLUTION RESPIRATORY (INHALATION) at 04:46

## 2021-10-07 RX ADMIN — GABAPENTIN 300 MG: 300 CAPSULE ORAL at 21:39

## 2021-10-07 RX ADMIN — INSULIN GLARGINE 50 UNITS: 100 INJECTION, SOLUTION SUBCUTANEOUS at 21:47

## 2021-10-07 RX ADMIN — DESMOPRESSIN ACETATE 40 MG: 0.2 TABLET ORAL at 21:39

## 2021-10-07 RX ADMIN — ACETAMINOPHEN 1000 MG: 500 TABLET ORAL at 17:07

## 2021-10-07 RX ADMIN — ENOXAPARIN SODIUM 40 MG: 40 INJECTION SUBCUTANEOUS at 08:49

## 2021-10-07 RX ADMIN — INSULIN LISPRO 2 UNITS: 100 INJECTION, SOLUTION INTRAVENOUS; SUBCUTANEOUS at 16:42

## 2021-10-07 RX ADMIN — IPRATROPIUM BROMIDE AND ALBUTEROL SULFATE 3 ML: .5; 2.5 SOLUTION RESPIRATORY (INHALATION) at 22:12

## 2021-10-07 RX ADMIN — Medication 10 MG: at 21:40

## 2021-10-07 RX ADMIN — MAGNESIUM GLUCONATE 500 MG ORAL TABLET 400 MG: 500 TABLET ORAL at 21:40

## 2021-10-07 RX ADMIN — PANTOPRAZOLE SODIUM 40 MG: 40 TABLET, DELAYED RELEASE ORAL at 21:46

## 2021-10-07 RX ADMIN — SODIUM CHLORIDE, PRESERVATIVE FREE 10 ML: 5 INJECTION INTRAVENOUS at 08:33

## 2021-10-07 RX ADMIN — PANTOPRAZOLE SODIUM 40 MG: 40 TABLET, DELAYED RELEASE ORAL at 08:33

## 2021-10-07 ASSESSMENT — PAIN SCALES - GENERAL
PAINLEVEL_OUTOF10: 0
PAINLEVEL_OUTOF10: 0
PAINLEVEL_OUTOF10: 6
PAINLEVEL_OUTOF10: 0
PAINLEVEL_OUTOF10: 6
PAINLEVEL_OUTOF10: 0

## 2021-10-07 ASSESSMENT — PAIN DESCRIPTION - LOCATION
LOCATION: HEAD
LOCATION: HEAD

## 2021-10-07 ASSESSMENT — PAIN DESCRIPTION - PAIN TYPE
TYPE: ACUTE PAIN
TYPE: ACUTE PAIN

## 2021-10-07 ASSESSMENT — ENCOUNTER SYMPTOMS
VOMITING: 0
SHORTNESS OF BREATH: 0
COUGH: 0
NAUSEA: 0
ABDOMINAL PAIN: 0

## 2021-10-07 NOTE — PLAN OF CARE
BRONCHOSPASM/BRONCHOCONSTRICTION     [x]         IMPROVE AERATION/BREATH SOUNDS  [x]   ADMINISTER BRONCHODILATOR THERAPY AS APPROPRIATE  [x]   ASSESS BREATH SOUNDS  []   IMPLEMENT AEROSOL/MDI PROTOCOL  [x]   PATIENT EDUCATION AS NEEDED       NON INVASIVE VENTILATION  PROVIDE OPTIMAL VENTILATION/ACCEPTABLE SP02  IMPLEMENT NON INVASIVE VENTILATION PROTOCOL  ASSESSMENT SKIN INTEGRITY  PATIENT EDUCATION AS NEEDED  BIPAP AS NEEDED independent

## 2021-10-07 NOTE — PROGRESS NOTES
Writer spoke with gela Retana and provided an update about the patients plan of care.  Amie Retana has no further questions or concerns at this time

## 2021-10-07 NOTE — PROGRESS NOTES
[facility]  Progress Note                   Date:   10/7/2021  Patient name: Huber Coles  Date of admission:  10/5/2021  6:18 PM  MRN:   [id number]  YOB: 1950  PCP: Ro Vale MD    Reason for Admission: Other chest pain [R07.89]  Nausea [R11.0]  Atypical chest pain [R07.89]  Hypoxia [R09.02]    Subjective:       Clinical Changes /Abnormalities:  Patient seen and examined in bed in room after discussion with RN. Reports ongoing chest pain which is reproducible on palpation. Ongoing SOB and dizziness at times. SR on tele. ECHO completed awaiting final read.     Review of Systems    Medications:   Scheduled Meds:   sodium chloride flush  5-40 mL IntraVENous 2 times per day    enoxaparin  40 mg SubCUTAneous Daily    atorvastatin  40 mg Oral Daily    [Held by provider] carvedilol  3.125 mg Oral BID    citalopram  20 mg Oral BID    gabapentin  300 mg Oral BID    insulin glargine  50 Units SubCUTAneous BID    ipratropium-albuterol  3 mL Inhalation Q4H    [Held by provider] losartan  25 mg Oral Daily    magnesium oxide  400 mg Oral BID    melatonin  10 mg Oral Nightly    pantoprazole  40 mg Oral BID    budesonide-formoterol  2 puff Inhalation Daily    topiramate  100 mg Oral Nightly    insulin lispro  0-12 Units SubCUTAneous TID WC    insulin lispro  0-6 Units SubCUTAneous Nightly    influenza virus vaccine  0.5 mL IntraMUSCular Prior to discharge     Continuous Infusions:   sodium chloride Stopped (10/07/21 0836)    dextrose       CBC:   Recent Labs     10/05/21  1852 10/06/21  0853 10/07/21  0623   WBC 7.2 10.4 9.0   HGB 14.4 14.0 13.8    179 141     BMP:    Recent Labs     10/05/21  1852 10/06/21  0847    135   K 4.2 4.9   CL 96* 98   CO2 27 26   BUN 17 22   CREATININE 1.01* 1.35*   GLUCOSE 84 106*     Hepatic:  Recent Labs     10/05/21  1852 10/06/21  0847   AST 56* 48*   ALT 39* 34*   BILITOT 0.39 0.27*   ALKPHOS 84 80     Troponin: Recent Labs     10/05/21  1852 10/06/21  0847   TROPHS 10 10     BNP: No results for input(s): BNP in the last 72 hours. Lipids: No results for input(s): CHOL, HDL in the last 72 hours. Invalid input(s): LDLCALCU  INR: No results for input(s): INR in the last 72 hours. DATA:    Diagnostics:       EKG:   Sinus rhythm with sinus arrhthymias, Left atrial deviation, increased QTc compared to previous EKG     ECHO:   Echo 02/02/2020 - showed EF 66%, G1DD.      Stress Test:   Stress test 04/06/2021 - showed no ischemia/infarct, EF 70%.     Cardiac Angiography:   Cath 2012 - 60% small 1st diagonal branch disease of LAD on cardiac cath, EF 70% . Objective:   Vitals: BP (!) 122/57   Pulse 77   Temp 98.1 °F (36.7 °C) (Axillary)   Resp 19   Ht 5' 2\" (1.575 m)   Wt 250 lb (113.4 kg)   LMP  (LMP Unknown)   SpO2 92%   BMI 45.73 kg/m²   General appearance: alert and cooperative with exam  HEENT: Head: Normocephalic, no lesions, without obvious abnormality. Neck:no JVD, trachea midline, no adenopathy  Lungs: Clear to auscultation  Heart: Regular rate and rhythm, s1/s2 auscultated, no murmurs. SR on tele HR81  Abdomen: soft, non-tender, bowel sounds active  Extremities: no edema  Neurologic: not done        Assessment / Acute Cardiac Problems:   1. Atypical chest pain  2. Sinus arrhythmia? with hypotension; prior history of A. Fib s/p ablation 2001? 3. HFpEF, Echo 02/02/2020 - showed EF 66%, G1DD  4. Acute hypoxic respiratory failure secondary COPD  5. Diabetes mellitis   6.  CKD stage II, with superimposed YAJAIRA     Patient Active Problem List:     Chronic pain of left knee     Type 2 diabetes mellitus with diabetic polyneuropathy, with long-term current use of insulin (HCC)     Nonintractable migraine, unspecified migraine type     Coronary artery disease due to lipid rich plaque     DDD (degenerative disc disease), lumbar     Chronic, continuous use of opioids     DDD (degenerative disc disease), cervical Osteoarthritis of cervical spine     Medication monitoring encounter     GERD (gastroesophageal reflux disease)     HTN (hypertension), benign     Mixed hyperlipidemia     Insomnia     Lumbosacral spondylosis without myelopathy     Sacroiliitis, not elsewhere classified (HCC)     Carpal tunnel syndrome     Mixed stress and urge urinary incontinence     Bilateral low back pain with sciatica     Intractable migraine with aura without status migrainosus     Spondylarthrosis     Anxiety and depression     Chronic migraine without aura without status migrainosus, not intractable     Morbid (severe) obesity with alveolar hypoventilation (HCC)     Lung nodule     Neuropathy     YAJAIRA (acute kidney injury) ([address]     Obstructive sleep apnea syndrome     Asthma with COPD ([address]     Gastroesophageal reflux disease with esophagitis     Obesity, Class III, BMI 40-49.9 (morbid obesity) (HCC)     Stage 3 chronic kidney disease (HCC)     Benign hypertension with CKD (chronic kidney disease) stage III (HCC)     Secondary diabetes mellitus with stage 3 chronic kidney disease (GFR 30-59) (HCC)     CKD (chronic kidney disease) stage 3, GFR 30-59 ml/min     Episode of altered cognition     Lumbar radiculopathy, chronic     Sessile colonic polyp     Gastroesophageal reflux disease     Morbid obesity (HCC)     Adenomatous polyp of ascending colon     Irritable bowel syndrome with both constipation and diarrhea     Abdominal bloating     Long term (current) use of insulin (HCC)     Major depressive disorder, single episode, unspecified     Permanent atrial fibrillation (HCC)     Polyneuropathy, unspecified     Other chronic pain     Opioid use, unspecified, uncomplicated     Chest pain     Atypical chest pain     Hypotension     Hypoxia     Left ventricular diastolic dysfunction     Transaminasemia     Acute respiratory failure with hypoxia and hypercarbia ([address]      Plan of Treatment:   1.  Awaiting ECHO results completed this am. 2. Atypical chest pain. Troponin and ECG negative for ACS. Recent stress neg for ischemia.    3. Rest of care managed per Primary team.      Electronically signed by VERONIKA Amador NP on 10/7/2021 at [facility]. [phone]

## 2021-10-07 NOTE — PROGRESS NOTES
[facility]  Office: [address], DO, Bubba Kemp, DO, Kamilla Wang, DO, Teressa Spidixon Blood, DO, Heide Espinoza MD, Filemon Siu MD, Jac Arizmendi MD, Claudean Millard, MD, Alexia Boswell MD, Mckenna Shah MD, Sabas Reynolds MD, Kin Everett, DO, Layla Craig DO, Michelle Lazo MD,  Kely Villagran DO, Alfonzo Slater MD, Vicky Juarez MD, Cheryle Maldonado MD, Whit King MD, Pablo Oliveira MD, Brenda Strong MD, Michelle Gutierrez, [facility], [facility], CNP, Roxana Whitman, CNP, Michael River, CNS, Chanel Sims, CNP, Kylee Vega, CNP, Soto Munoz, CNP, Lizy Méndez, CNP, Gris Tilley, CNP, Shabnam Curran PA-C, Gary Rowell, [facility], Mayela Thurman, CNP, Massimo Fernandez, CNP, Ceci Fernandes, CNP, Haleigh Julio, CNP, Mary Alice Jerome, CNP, Alexandra Meneses, [facility], Tim Saint, [address]    Progress Note    10/7/2021    6:43 PM    Name:   Chester Hartmann  MRN:     [id number]     [place]:      [de-identified]   Room:   2016/2016-01  IP Day:  1  Admit Date:  10/5/2021  6:18 PM    PCP:   Margarita Vences MD  Code Status:  Full Code    Subjective:     C/C:   Chief Complaint   Patient presents with    Chest Pain     x1 wk     Interval History Status:   Doing ok with diet   Hypoglycemic last night, asymptomatic  Doing okay on nasal cannula  No chest pain    Data Base Updates:  T-max 99.3  O2 sats satisfactory    Oetpcpa58Cma     WBC9.0k/uL RBC4.50m/uL   Zqakkjdsho12.8g/dL Cuaguztkzq50. 0High %   DHR622. 9High     Follow-up chest x-ray:  Impression:  Mild bibasilar airspace disease, though overall improved aeration in the left   perihilar region and lung base.        Brief History:     The patient has been evaluated at the request of the observation service for admission      History of Present Illness:  Chester Hartmann is a 69 y.o.  female who presents with Chest Pain (x1 wk)     Patient was admitted thru ER to be Observation unit with:  Grzegorz Cervantes Lois is a  75 y. o. female with extensive past medical history including diabetes, CHF, COPD, CKD, and TIA on home oxygen who presents to the ED for 3 days of intermittent \"electrical\" shooting pain in her left chest that radiates to her back under a scapula.     It lasts for approx 20 seconds and resolves to 0.   Occurred a few times in the past 2 days, twice today, the 2nd time greater in intensity and causing pain into her left arm, which is what brought her to the department to be evaluated.  Describes her left arm as being painful and weak during the episode, resolved but now tender in the deltoid region.   No increase in cough in the last several weeks, is on her baseline oxygen therapy of 2 L intermittent during the day and meeting her goal of 87 to 92%.  She intermittently will get swelling in her bilateral lower extremities but not in the last few days.    Completed a nuc med stress test in April of this year for chest pain, with LVEF >70% and stratified as low risk. Cecilia Billings was seen in the ED in April for similar presentation, workup at that was unremarkable. \"     The patient was not wearing her BiPAP this morning  O2 sats dropped to 65%  BP as low as 67/47 noted  The patient was found to be somnolent  Hospitalist service was consulted for admission and management     Subsequent database has included:  Chest x-ray:  Impression:  New left infrahilar opacity, which may be related to atelectasis or small   infiltrate. The pulmonary vasculature appears unremarkable.      Fqwnkha471Byqb mg/dL      IKD10km/dL   CREATININE1. 35High      Results for Aristeo Maya (MRN [id number]) as of 10/6/2021 11:29    Ref.  Range 4/30/2021 12:41 5/26/2021 15:59 10/5/2021 18:52 10/6/2021 08:47   Creatinine Latest Ref Range: 0.50 - 0.90 mg/dL 1.01 (H) 1.06 (H) 1.01 (H) 1.35 (H)      EKG:  Sinus rhythm with marked sinus arrhythmia   Left axis deviation   Abnormal ECG   When compared with ECG of 05-OCT-2021 18:22,   QRS III ([facility] CAD (coronary artery disease), Caffeine use, CHF (congestive heart failure) ([facility] Chronic kidney disease, CKD (chronic kidney disease) stage 3, GFR 30-59 ml/min ([facility]), COPD (chronic obstructive pulmonary disease) ([facility] Degeneration of lumbar or lumbosacral intervertebral disc, Depression, DM (diabetes mellitus) ([facility] Emphysema of lung ([facility] Gastritis, GERD (gastroesophageal reflux disease), Hearing loss, Hematuria, Hiatal hernia, HTN (hypertension), benign, Hypertriglyceridemia, Incontinence of urine, Irritable bowel syndrome with both constipation and diarrhea, Knee arthropathy, Lumbosacral spondylosis without myelopathy, Migraine headache, Mumps, Neuropathy, Obesity, On home oxygen therapy, HIGINIO on CPAP, Otitis media, Secondary diabetes mellitus with stage 3 chronic kidney disease (GFR 30-59) ([facility] Stroke ([facility] Tinnitus, Type 2 diabetes mellitus without complication ([facility] Type II or unspecified type diabetes mellitus without mention of complication, not stated as uncontrolled, UTI (lower urinary tract infection), and Varicose vein. Social History:   reports that she quit smoking about 21 years ago. Her smoking use included cigarettes. She has a 123.00 pack-year smoking history. She has never used smokeless tobacco. She reports that she does not drink alcohol and does not use drugs. Family History:   Family History   Problem Relation Age of Onset    Diabetes Mother     Heart Disease Mother     Stomach Cancer Father     Diabetes Maternal Grandmother         also aunts and uncles (maternal)    Emphysema Sister        Review of Systems:     Review of Systems   Constitutional: Positive for appetite change (Decreased). Respiratory: Negative for cough and shortness of breath ([place]). Cardiovascular: Positive for leg swelling. Negative for chest pain and palpitations. Gastrointestinal: Negative for abdominal pain, nausea and vomiting.    Genitourinary: Negative for flank pain and hematuria. Physical Examination:        Vitals  BP (!) 114/49   Pulse 77   Temp 99 °F (37.2 °C) (Oral)   Resp 19   Ht 5' 2\" (1.575 m)   Wt 250 lb (113.4 kg)   LMP  (LMP Unknown)   SpO2 98%   BMI 45.73 kg/m²   Temp (24hrs), Av °F (37.2 °C), Min:98.1 °F (36.7 °C), Max:99.4 °F (37.4 °C)    Recent Labs     10/07/21  0749 10/07/21  0853 10/07/21  1106 10/07/21  1638   POCGLU 44* 126* 89 157*       Physical Exam  Vitals reviewed. Constitutional:       General: She is not in acute distress. Appearance: She is ill-appearing. She is not diaphoretic. HENT:      Head: Normocephalic. Nose: Nose normal.   Eyes:      General: No scleral icterus. Conjunctiva/sclera: Conjunctivae normal.   Neck:      Trachea: No tracheal deviation. Cardiovascular:      Rate and Rhythm: Normal rate and regular rhythm. Pulmonary:      Effort: Pulmonary effort is normal. No respiratory distress. Breath sounds: Rhonchi and rales present. No wheezing. Chest:      Chest wall: No tenderness. Abdominal:      General: Bowel sounds are normal. There is no distension. Palpations: Abdomen is soft. Tenderness: There is no abdominal tenderness. Musculoskeletal:         General: No tenderness. Cervical back: Neck supple. Right lower leg: Edema present. Left lower leg: Edema present. Skin:     General: Skin is warm and dry. Medications: Allergies: Allergies   Allergen Reactions    Robitussin [Guaifenesin] Anaphylaxis    Codeine Swelling    Compazine [Prochlorperazine Maleate] Hives and Swelling    Iodides Itching    Morphine Other (See Comments)     hallucinations    Moxifloxacin      Other reaction(s): Intolerance-unknown  Other reaction(s):  Intolerance-unknown    Reglan [Metoclopramide] Nausea Only    Avelox [Moxifloxacin Hcl In Nacl] Rash     Dermatitis      Cipro Xr Rash     dermatitis    Sulfa Antibiotics Rash       Current Meds:   Scheduled Meds:    sodium chloride flush  5-40 mL IntraVENous 2 times per day    enoxaparin  40 mg SubCUTAneous Daily    atorvastatin  40 mg Oral Daily    [Held by provider] carvedilol  3.125 mg Oral BID    citalopram  20 mg Oral BID    gabapentin  300 mg Oral BID    insulin glargine  50 Units SubCUTAneous BID    ipratropium-albuterol  3 mL Inhalation Q4H    [Held by provider] losartan  25 mg Oral Daily    magnesium oxide  400 mg Oral BID    melatonin  10 mg Oral Nightly    pantoprazole  40 mg Oral BID    budesonide-formoterol  2 puff Inhalation Daily    topiramate  100 mg Oral Nightly    insulin lispro  0-12 Units SubCUTAneous TID WC    insulin lispro  0-6 Units SubCUTAneous Nightly    influenza virus vaccine  0.5 mL IntraMUSCular Prior to discharge     Continuous Infusions:    sodium chloride Stopped (10/07/21 0836)    dextrose       PRN Meds: sodium chloride flush, sodium chloride, ondansetron **OR** ondansetron, acetaminophen, albuterol, dicyclomine, oxyCODONE-acetaminophen, glucose, dextrose, glucagon (rDNA), dextrose    Data:     I/O (24Hr):     Intake/Output Summary (Last 24 hours) at 10/7/2021 1843  Last data filed at 10/7/2021 0600  Gross per 24 hour   Intake 1459 ml   Output 425 ml   Net 1034 ml       Labs:  Hematology:  Recent Labs     10/05/21  1852 10/06/21  0853 10/07/21  0623   WBC 7.2 10.4 9.0   RBC 4.84 4.54 4.50   HGB 14.4 14.0 13.8   HCT 47.9* 44.7 49.0*   MCV 99.0 98.5 108.9*   MCH 29.8 30.8 30.7   MCHC 30.1 31.3 28.2*   RDW 12.7 12.7 12.5    179 141   MPV 11.5 11.9 12.2     Chemistry:  Recent Labs     10/05/21  1852 10/06/21  0847    135   K 4.2 4.9   CL 96* 98   CO2 27 26   GLUCOSE 84 106*   BUN 17 22   CREATININE 1.01* 1.35*   ANIONGAP 13 11   LABGLOM 54* 39*   GFRAA >60 47*   CALCIUM 9.7 9.5   PROBNP 118 145   TROPHS 10 10     Recent Labs     10/05/21  1852 10/05/21  2321 10/06/21  0847 10/06/21  1114 10/06/21  2034 10/06/21  2051 10/07/21  0749 10/07/21  0853 10/07/21  1106 10/07/21  1638   PROT 7.4  --  7.0  --   --   --   --   --   --   --    LABALBU 3.9  --  3.6  --   --   --   --   --   --   --    AST 56*  --  48*  --   --   --   --   --   --   --    ALT 39*  --  34*  --   --   --   --   --   --   --    ALKPHOS 84  --  80  --   --   --   --   --   --   --    BILITOT 0.39  --  0.27*  --   --   --   --   --   --   --    POCGLU  --    < >  --    < > 87 97 44* 126* 89 157*    < > = values in this interval not displayed. ABG:  Lab Results   Component Value Date    POCPH 7.239 10/06/2021    PH 7.346 11/21/2011    POCPCO2 77.6 10/06/2021    PCO2 49.8 11/21/2011    POCPO2 137.2 10/06/2021    PO2ART 61.3 11/21/2011    POCHCO3 33.1 10/06/2021    WOV2EAD 26.5 11/21/2011    NBEA NOT REPORTED 10/06/2021    PBEA 3 10/06/2021    LLF4NAP NOT REPORTED 10/06/2021    MOXL6QPW 98 10/06/2021    C0NNVFMN 89.7 11/21/2011    FIO2 INFORMATION NOT PROVIDED 10/06/2021     Lab Results   Component Value Date/Time    SPECIAL NOT REPORTED 03/14/2019 04:35 PM     Lab Results   Component Value Date/Time    CULTURE NO SIGNIFICANT GROWTH 03/14/2019 04:35 PM       Radiology:  XR CHEST (2 VW)    Result Date: 10/5/2021  No evidence of acute cardiopulmonary disease     XR CHEST PORTABLE    Result Date: 10/7/2021  Mild bibasilar airspace disease, though overall improved aeration in the left perihilar region and lung base. XR CHEST PORTABLE    Result Date: 10/6/2021  New left infrahilar opacity, which may be related to atelectasis or small infiltrate. The pulmonary vasculature appears unremarkable.        Assessment:        Primary Problem  Acute respiratory failure with hypoxia and hypercarbia [facility])    Active Hospital Problems    Diagnosis Date Noted    Type 2 diabetes mellitus with diabetic polyneuropathy, with long-term current use of insulin (HCC) [E11.42, Z79.4]      Priority: High    Macrocytosis [D75.89] 10/07/2021    Hypotension [I95.9] 10/06/2021    Hypoxia [R09.02] 10/06/2021    Left ventricular diastolic dysfunction [P07.4] 10/06/2021    Transaminasemia [R74.01] 10/06/2021    Acute respiratory failure with hypoxia and hypercarbia ([facility] [J96.01, J96.02] 10/06/2021    Atypical chest pain [R07.89] 10/05/2021    Chest pain [R07.9] 04/05/2021    Morbid obesity ([facility] [E66.01] 01/26/2021    Obesity, Class III, BMI 40-49.9 (morbid obesity) ([facility] [E66.01] 07/16/2018    Asthma with COPD ([facility] [J44.9] 07/16/2018    YAJAIRA (acute kidney injury) ([facility] [N17.9] 02/01/2018    Obstructive sleep apnea syndrome [G47.33] 02/01/2018    Morbid (severe) obesity with alveolar hypoventilation (HCC) [E66.2] 12/07/2016    GERD (gastroesophageal reflux disease) [K21.9] 06/28/2014    HTN (hypertension), benign [I10] 06/28/2014         Plan:        Pulmonary/critical care consult for respiratory failure  Cardiology evaluation in progress for atypical chest pain  Optimize cardio-pulmonary function  Respiratory Therapy and Bronchodilators prn   BiPAP   Oxygen supplementation as needed  Avoid respiratory depressants  Glycemic contol - monitor and control blood sugars  Observe for symptomatic hypoglycemia  Blood Pressure - Monitor and control  Observe for symptomatic hypotension  Reflux precautions  Risk factor management / weight loss   YAJAIRA: Check bun and creatinine  Trend LFT  Check B12/folate  PT/OT  DVT prophylaxis     IP CONSULT TO RESPIRATORY CARE  IP CONSULT TO INTERNAL MEDICINE  IP CONSULT TO HOSPITALIST  IP CONSULT TO IV TEAM  IP CONSULT TO CRITICAL CARE  IP CONSULT TO CARDIOLOGY  IP CONSULT TO [address] [place] [address]  10/7/2021  6:43 PM

## 2021-10-08 LAB
ANION GAP SERPL CALCULATED.3IONS-SCNC: 8 MMOL/L (ref 9–17)
BUN BLDV-MCNC: 19 MG/DL (ref 8–23)
BUN/CREAT BLD: ABNORMAL (ref 9–20)
CALCIUM SERPL-MCNC: 8.4 MG/DL (ref 8.6–10.4)
CHLORIDE BLD-SCNC: 101 MMOL/L (ref 98–107)
CO2: 29 MMOL/L (ref 20–31)
CREAT SERPL-MCNC: 1.12 MG/DL (ref 0.5–0.9)
GFR AFRICAN AMERICAN: 58 ML/MIN
GFR NON-AFRICAN AMERICAN: 48 ML/MIN
GFR SERPL CREATININE-BSD FRML MDRD: ABNORMAL ML/MIN/{1.73_M2}
GFR SERPL CREATININE-BSD FRML MDRD: ABNORMAL ML/MIN/{1.73_M2}
GLUCOSE BLD-MCNC: 135 MG/DL (ref 65–105)
GLUCOSE BLD-MCNC: 140 MG/DL (ref 70–99)
GLUCOSE BLD-MCNC: 255 MG/DL (ref 65–105)
GLUCOSE BLD-MCNC: 287 MG/DL (ref 65–105)
GLUCOSE BLD-MCNC: 372 MG/DL (ref 65–105)
POTASSIUM SERPL-SCNC: 5.2 MMOL/L (ref 3.7–5.3)
SARS-COV-2, RAPID: NOT DETECTED
SODIUM BLD-SCNC: 138 MMOL/L (ref 135–144)
SPECIMEN DESCRIPTION: NORMAL

## 2021-10-08 PROCEDURE — 99254 IP/OBS CNSLTJ NEW/EST MOD 60: CPT | Performed by: INTERNAL MEDICINE

## 2021-10-08 PROCEDURE — 87635 SARS-COV-2 COVID-19 AMP PRB: CPT

## 2021-10-08 PROCEDURE — 82607 VITAMIN B-12: CPT

## 2021-10-08 PROCEDURE — 2580000003 HC RX 258: Performed by: HEALTH CARE PROVIDER

## 2021-10-08 PROCEDURE — G0008 ADMIN INFLUENZA VIRUS VAC: HCPCS | Performed by: NURSE PRACTITIONER

## 2021-10-08 PROCEDURE — 6370000000 HC RX 637 (ALT 250 FOR IP): Performed by: HEALTH CARE PROVIDER

## 2021-10-08 PROCEDURE — 82746 ASSAY OF FOLIC ACID SERUM: CPT

## 2021-10-08 PROCEDURE — 99232 SBSQ HOSP IP/OBS MODERATE 35: CPT | Performed by: INTERNAL MEDICINE

## 2021-10-08 PROCEDURE — 2060000000 HC ICU INTERMEDIATE R&B

## 2021-10-08 PROCEDURE — 90686 IIV4 VACC NO PRSV 0.5 ML IM: CPT | Performed by: NURSE PRACTITIONER

## 2021-10-08 PROCEDURE — 94640 AIRWAY INHALATION TREATMENT: CPT

## 2021-10-08 PROCEDURE — 82947 ASSAY GLUCOSE BLOOD QUANT: CPT

## 2021-10-08 PROCEDURE — 2700000000 HC OXYGEN THERAPY PER DAY

## 2021-10-08 PROCEDURE — 6370000000 HC RX 637 (ALT 250 FOR IP): Performed by: INTERNAL MEDICINE

## 2021-10-08 PROCEDURE — 6360000002 HC RX W HCPCS: Performed by: HEALTH CARE PROVIDER

## 2021-10-08 PROCEDURE — 97116 GAIT TRAINING THERAPY: CPT

## 2021-10-08 PROCEDURE — 36415 COLL VENOUS BLD VENIPUNCTURE: CPT

## 2021-10-08 PROCEDURE — 94761 N-INVAS EAR/PLS OXIMETRY MLT: CPT

## 2021-10-08 PROCEDURE — 97530 THERAPEUTIC ACTIVITIES: CPT

## 2021-10-08 PROCEDURE — 80048 BASIC METABOLIC PNL TOTAL CA: CPT

## 2021-10-08 PROCEDURE — 6360000002 HC RX W HCPCS: Performed by: NURSE PRACTITIONER

## 2021-10-08 PROCEDURE — 94660 CPAP INITIATION&MGMT: CPT

## 2021-10-08 PROCEDURE — 97162 PT EVAL MOD COMPLEX 30 MIN: CPT

## 2021-10-08 RX ORDER — CLOPIDOGREL BISULFATE 75 MG/1
75 TABLET ORAL DAILY
Status: DISCONTINUED | OUTPATIENT
Start: 2021-10-08 | End: 2021-10-09 | Stop reason: HOSPADM

## 2021-10-08 RX ORDER — OXYCODONE HYDROCHLORIDE AND ACETAMINOPHEN 5; 325 MG/1; MG/1
1 TABLET ORAL EVERY 6 HOURS PRN
Qty: 15 TABLET | Refills: 0 | Status: SHIPPED | OUTPATIENT
Start: 2021-10-08 | End: 2021-10-15

## 2021-10-08 RX ORDER — UBIDECARENONE 75 MG
50 CAPSULE ORAL DAILY
Status: DISCONTINUED | OUTPATIENT
Start: 2021-10-08 | End: 2021-10-09 | Stop reason: HOSPADM

## 2021-10-08 RX ORDER — ISOSORBIDE DINITRATE 10 MG/1
30 TABLET ORAL DAILY
Status: DISCONTINUED | OUTPATIENT
Start: 2021-10-08 | End: 2021-10-09 | Stop reason: HOSPADM

## 2021-10-08 RX ORDER — FUROSEMIDE 20 MG/1
20 TABLET ORAL DAILY
Status: DISCONTINUED | OUTPATIENT
Start: 2021-10-08 | End: 2021-10-09 | Stop reason: HOSPADM

## 2021-10-08 RX ADMIN — OXYCODONE HYDROCHLORIDE AND ACETAMINOPHEN 1 TABLET: 5; 325 TABLET ORAL at 13:57

## 2021-10-08 RX ADMIN — IPRATROPIUM BROMIDE AND ALBUTEROL SULFATE 3 ML: .5; 2.5 SOLUTION RESPIRATORY (INHALATION) at 00:38

## 2021-10-08 RX ADMIN — MAGNESIUM GLUCONATE 500 MG ORAL TABLET 400 MG: 500 TABLET ORAL at 09:34

## 2021-10-08 RX ADMIN — IPRATROPIUM BROMIDE AND ALBUTEROL SULFATE 3 ML: .5; 2.5 SOLUTION RESPIRATORY (INHALATION) at 19:52

## 2021-10-08 RX ADMIN — Medication 10 MG: at 20:50

## 2021-10-08 RX ADMIN — IPRATROPIUM BROMIDE AND ALBUTEROL SULFATE 3 ML: .5; 2.5 SOLUTION RESPIRATORY (INHALATION) at 16:01

## 2021-10-08 RX ADMIN — CITALOPRAM 20 MG: 20 TABLET, FILM COATED ORAL at 09:35

## 2021-10-08 RX ADMIN — PANTOPRAZOLE SODIUM 40 MG: 40 TABLET, DELAYED RELEASE ORAL at 20:49

## 2021-10-08 RX ADMIN — PANTOPRAZOLE SODIUM 40 MG: 40 TABLET, DELAYED RELEASE ORAL at 09:35

## 2021-10-08 RX ADMIN — OXYCODONE HYDROCHLORIDE AND ACETAMINOPHEN 1 TABLET: 5; 325 TABLET ORAL at 22:04

## 2021-10-08 RX ADMIN — CLOPIDOGREL 75 MG: 75 TABLET, FILM COATED ORAL at 22:05

## 2021-10-08 RX ADMIN — INSULIN GLARGINE 50 UNITS: 100 INJECTION, SOLUTION SUBCUTANEOUS at 09:37

## 2021-10-08 RX ADMIN — IPRATROPIUM BROMIDE AND ALBUTEROL SULFATE 3 ML: .5; 2.5 SOLUTION RESPIRATORY (INHALATION) at 08:09

## 2021-10-08 RX ADMIN — INFLUENZA A VIRUS A/VICTORIA/2570/2019 IVR-215 (H1N1) ANTIGEN (PROPIOLACTONE INACTIVATED), INFLUENZA A VIRUS A/CAMBODIA/E0826360/2020 IVR-224 (H3N2) ANTIGEN (PROPIOLACTONE INACTIVATED), INFLUENZA B VIRUS B/VICTORIA/705/2018 BVR-11 ANTIGEN (PROPIOLACTONE INACTIVATED), INFLUENZA B VIRUS B/PHUKET/3073/2013 BVR-1B ANTIGEN (PROPIOLACTONE INACTIVATED) 0.5 ML: 15; 15; 15; 15 INJECTION, SUSPENSION INTRAMUSCULAR at 19:21

## 2021-10-08 RX ADMIN — IPRATROPIUM BROMIDE AND ALBUTEROL SULFATE 3 ML: .5; 2.5 SOLUTION RESPIRATORY (INHALATION) at 04:59

## 2021-10-08 RX ADMIN — SODIUM CHLORIDE, PRESERVATIVE FREE 10 ML: 5 INJECTION INTRAVENOUS at 22:05

## 2021-10-08 RX ADMIN — IPRATROPIUM BROMIDE AND ALBUTEROL SULFATE 3 ML: .5; 2.5 SOLUTION RESPIRATORY (INHALATION) at 11:13

## 2021-10-08 RX ADMIN — ISOSORBIDE DINITRATE 30 MG: 10 TABLET ORAL at 22:05

## 2021-10-08 RX ADMIN — BUDESONIDE AND FORMOTEROL FUMARATE DIHYDRATE 2 PUFF: 160; 4.5 AEROSOL RESPIRATORY (INHALATION) at 08:13

## 2021-10-08 RX ADMIN — DESMOPRESSIN ACETATE 40 MG: 0.2 TABLET ORAL at 20:50

## 2021-10-08 RX ADMIN — VITAM B12 50 MCG: 100 TAB at 13:44

## 2021-10-08 RX ADMIN — INSULIN LISPRO 3 UNITS: 100 INJECTION, SOLUTION INTRAVENOUS; SUBCUTANEOUS at 20:51

## 2021-10-08 RX ADMIN — INSULIN LISPRO 10 UNITS: 100 INJECTION, SOLUTION INTRAVENOUS; SUBCUTANEOUS at 13:10

## 2021-10-08 RX ADMIN — FUROSEMIDE 20 MG: 20 TABLET ORAL at 13:44

## 2021-10-08 RX ADMIN — TOPIRAMATE 100 MG: 100 TABLET, FILM COATED ORAL at 20:49

## 2021-10-08 RX ADMIN — CITALOPRAM 20 MG: 20 TABLET, FILM COATED ORAL at 20:50

## 2021-10-08 RX ADMIN — GABAPENTIN 300 MG: 300 CAPSULE ORAL at 20:50

## 2021-10-08 RX ADMIN — SODIUM CHLORIDE, PRESERVATIVE FREE 10 ML: 5 INJECTION INTRAVENOUS at 09:34

## 2021-10-08 RX ADMIN — MAGNESIUM GLUCONATE 500 MG ORAL TABLET 400 MG: 500 TABLET ORAL at 20:50

## 2021-10-08 RX ADMIN — INSULIN LISPRO 6 UNITS: 100 INJECTION, SOLUTION INTRAVENOUS; SUBCUTANEOUS at 16:51

## 2021-10-08 RX ADMIN — GABAPENTIN 300 MG: 300 CAPSULE ORAL at 09:34

## 2021-10-08 RX ADMIN — INSULIN GLARGINE 50 UNITS: 100 INJECTION, SOLUTION SUBCUTANEOUS at 20:52

## 2021-10-08 RX ADMIN — ENOXAPARIN SODIUM 40 MG: 40 INJECTION SUBCUTANEOUS at 09:34

## 2021-10-08 ASSESSMENT — ENCOUNTER SYMPTOMS
SHORTNESS OF BREATH: 1
BACK PAIN: 1
NAUSEA: 0
VOMITING: 0
ABDOMINAL PAIN: 0
COUGH: 0

## 2021-10-08 ASSESSMENT — PAIN DESCRIPTION - PAIN TYPE
TYPE: ACUTE PAIN

## 2021-10-08 ASSESSMENT — PAIN SCALES - GENERAL
PAINLEVEL_OUTOF10: 4
PAINLEVEL_OUTOF10: 8
PAINLEVEL_OUTOF10: 0

## 2021-10-08 ASSESSMENT — PAIN DESCRIPTION - LOCATION
LOCATION: BACK
LOCATION: BACK
LOCATION: BACK;HEAD

## 2021-10-08 NOTE — CONSULTS
Patient - Mayelin Dang   MRN -  [id number]   Acct # - [de-identified]   - 1950        Date of evaluation -  10/8/2021    REASON FOR THE CONSULTATION:  HIGINIO  COPD  HISTORY OF PRESENT ILLNESS:    Mayelin Dang is a 69y.o. year old female here for evaluation of chest pain. Chest pain of 3 days duration of the left side  radiating to scapula. She was initially admitted to observation unit and then transferred to inpatient unit. She is on chronically 2 L of oxygen follows with Dr. Rachid Thomas. Has COPD and HIGINIO has CPAP at home which she uses regularly according to her. She complained of increased cough and was found to be desaturating. At present patient is doing well she is on 4 L oxygen denies any wheezing saturations are greater than 90%. Immunization   Immunization History   Administered Date(s) Administered    COVID-19, Moderna, PF, 100mcg/0.5mL 02/10/2021, 03/10/2021    Influenza 10/18/2012, 10/07/2013    Influenza Virus Vaccine 10/02/2014, 10/20/2015    Influenza Whole 2010    Influenza, High Dose (Fluzone 65 yrs and older) 11/10/2016, 10/19/2017, 10/08/2018    Influenza, Quadv, IM, PF (6 mo and older Fluzone, Flulaval, Fluarix, and 3 yrs and older Afluria) 2019    Influenza, Quadv, adjuvanted, 65 yrs +, IM, PF (Fluad) 2020    Pneumococcal Conjugate 13-valent ([id number]) 06/15/2016    Pneumococcal Polysaccharide ([id number]) 2009, 2017              PAST MEDICAL HISTORY:       Diagnosis Date    Abdominal bloating 2021    Adenomatous polyp of ascending colon 2021    Allergic rhinitis     Anxiety 2013    Arthritis     Asthma     Atrial fibrillation (HCC)     Back pain     NERVE/DR. GRACIA    Benign hypertension with CKD (chronic kidney disease) stage III (HCC)     CAD (coronary artery disease) 3/21/2013    Caffeine use     2 coffee/day    CHF (congestive heart failure) (HCC)     Chronic kidney disease     CKD (chronic kidney disease) stage 3, GFR 30-59 ml/min ([facility])     COPD (chronic obstructive pulmonary disease) ([facility])     emphysema    Degeneration of lumbar or lumbosacral intervertebral disc     Depression     DM (diabetes mellitus) ([facility])     Emphysema of lung ([facility])     Gastritis     GERD (gastroesophageal reflux disease)     Hearing loss     Hematuria     Hiatal hernia     HTN (hypertension), benign 6/28/2014    Hypertriglyceridemia     Incontinence of urine 9/25/2013    Irritable bowel syndrome with both constipation and diarrhea 1/26/2021    Knee arthropathy     Lumbosacral spondylosis without myelopathy 9/29/2014    Migraine headache     DR. GRACIA    Mumps     Neuropathy     Obesity     On home oxygen therapy     2 L PER NC  HS AND PRN    HIGINIO on CPAP     Otitis media 1-26-15    Secondary diabetes mellitus with stage 3 chronic kidney disease (GFR 30-59) ([facility])     Stroke ([facility] [address]      mini stroke.     Tinnitus     Type 2 diabetes mellitus without complication ([facility])     Type II or unspecified type diabetes mellitus without mention of complication, not stated as uncontrolled     UTI (lower urinary tract infection)     Varicose vein          Family History:       Problem Relation Age of Onset    Diabetes Mother     Heart Disease Mother     Stomach Cancer Father     Diabetes Maternal Grandmother         also aunts and uncles (maternal)    Emphysema Sister        SURGICAL HISTORY:   Past Surgical History:   Procedure Laterality Date    ABLATION OF DYSRHYTHMIC FOCUS  2000    CARPAL TUNNEL RELEASE Right     CATARACT REMOVAL WITH IMPLANT Bilateral     CHOLECYSTECTOMY  2007    COLONOSCOPY      COLONOSCOPY  04/10/2017    tubular adenomo polyp , mod. sigmoid diverticulosis, min. int. hemorrhoids    COLONOSCOPY N/A 3/2/2021    COLONOSCOPY WITH BIOPSY performed by Chiquis Mccabe MD at [place] [address]  9/25/2013    DILATION AND CURETTAGE  1979    EYE SURGERY      laser    INCONTINENCE SURGERY      Percutaneous nerve stimulation Dr Gerardo Nath 2013    Loridarlene Davisin INSERTABLE CARDIAC MONITOR  2015    [employer] REVEAL LINQ MODEL #[id number] MRI CONDTIONAL OK 3T. IMMEDIATELY POST IMPLANT    OTHER SURGICAL HISTORY  09/10/15    removal of interstim    [place] COLSC FLX W/REMOVAL LESION BY HOT BX FORCEPS N/A 4/10/2017    COLONOSCOPY POLYPECTOMY HOT BIOPSY performed by Jeanetta Pallas, MD at [address] TYMPANOPLASTY      TYMPANOPLASTY      TYMPANOSTOMY TUBE PLACEMENT  2017    UPPER GASTROINTESTINAL ENDOSCOPY  2016    Dr Juan White N/A 3/2/2021    EGD BIOPSY performed by Abdoul Jha MD at [address] [place] [address] Rd:    Social History     Socioeconomic History    Marital status: Legally      Spouse name: None    Number of children: None    Years of education: None    Highest education level: None   Occupational History    Occupation: disabled     Employer: N/A   Tobacco Use    Smoking status: Former Smoker     Packs/day: 3.00     Years: 41.00     Pack years: 123.00     Types: Cigarettes     Quit date: 2000     Years since quittin.7    Smokeless tobacco: Never Used    Tobacco comment: quit in    Vaping Use    Vaping Use: Never used   Substance and Sexual Activity    Alcohol use: No     Alcohol/week: 0.0 standard drinks    Drug use: No    Sexual activity: Not Currently   Other Topics Concern    None   Social History Narrative    None     Social Determinants of Health     Financial Resource Strain:     Difficulty of Paying Living Expenses:    Food Insecurity:     Worried About Running Out of Food in the Last Year:     Ran Out of Food in the Last Year:    Transportation Needs:     Lack of Transportation (Medical):      Lack of Transportation (Non-Medical):    Physical Activity:     Days of Exercise per Week:     Minutes of Exercise per Session:    Stress:     Feeling of Stress :    Social Connections:     Frequency of Communication with Friends and Family:     Frequency of Social Gatherings with Friends and Family:     Attends [employer] Services:     Active Member of Clubs or Organizations:     Attends Club or Organization Meetings:     Marital Status:    Intimate Partner Violence:     Fear of Current or Ex-Partner:     Emotionally Abused:     Physically Abused:     Sexually Abused:         TOBACCO:   reports that she quit smoking about 21 years ago. Her smoking use included cigarettes. She has a 123.00 pack-year smoking history. She has never used smokeless tobacco.  ETOH:   reports no history of alcohol use. ALLERGIES:    Allergies   Allergen Reactions    Robitussin [Guaifenesin] Anaphylaxis    Codeine Swelling    Compazine [Prochlorperazine Maleate] Hives and Swelling    Iodides Itching    Morphine Other (See Comments)     hallucinations    Moxifloxacin      Other reaction(s): Intolerance-unknown  Other reaction(s): Intolerance-unknown    Reglan [Metoclopramide] Nausea Only    Avelox [Moxifloxacin Hcl In Nacl] Rash     Dermatitis      Cipro Xr Rash     dermatitis    Sulfa Antibiotics Rash       Home Meds:   Prior to Admission medications    Medication Sig Start Date End Date Taking? Authorizing Provider   metFORMIN (GLUCOPHAGE) 1000 MG tablet TAKE 1 TAB BY MOUTH TWICE A DAY ( IN THE MORNING AND BEDTIME )  10/1/21  Yes Margarita Vences MD   oxyCODONE-acetaminophen (PERCOCET) 5-325 MG per tablet Take 1 tablet by mouth See Admin Instructions for 30 days. Intended supply: 30 days.  1 tab 3-4 times a day prn 10/1/21 10/31/21 Yes VERONIKA Murillo - CNP   insulin glargine (LANTUS SOLOSTAR) 100 UNIT/ML injection pen Inject 42 Units into the skin nightly  Patient taking differently: Inject 50 Units into the skin 2 times daily  9/27/21  Yes Margarita Vences MD   blood glucose test strips (ONETOUCH ULTRA) strip TEST TWO (2) TO THREE (3) TIMES A DAY & AS NEEDED FOR SYMPTOMS OF IRREGULAR BLOOD GLUCOSE 8/23/21  Yes Mendel Gola, MD   blood glucose test strips (ONETOUCH ULTRA) strip TEST TWO (2) TO THREE (3) TIMES A DAY & AS NEEDED FOR SYMPTOMS OF IRREGULAR BLOOD GLUCOSE 8/20/21  Yes Mendel Gola, MD   ASPIRIN LOW DOSE 81 MG EC tablet TAKE 1 TAB BY MOUTH ONCE A DAY  4/23/21  Yes Mendel Gola, MD   Blood Glucose Monitoring Suppl (ONE TOUCH ULTRA 2) w/Device KIT  2/10/21  Yes Historical Provider, MD   ONETOUCH ULTRA strip TEST TWO (2) TO THREE (3) TIMES A DAY& AS NEEDED  3/5/21  Yes Mendel Gola, MD   insulin aspart (NOVOLOG FLEXPEN) 100 UNIT/ML injection pen IF <139 - NO INSULIN; 140-199-2 UNITS;200-249-4 UNITS;250-299-6 UNITS;300-349-8 UNITS;350-400=10 UNITS;ABOVE 400-12 UNITS 3/3/21  Yes Mendel Gola, MD   atorvastatin (LIPITOR) 40 MG tablet Take 1 tablet by mouth daily 2/28/21  Yes Mendel Gola, MD   Multiple Vitamins-Minerals (THERAPEUTIC MULTIVITAMIN-MINERALS) tablet Take 1 tablet by mouth daily Takes Women over 50 Complete Vitamin daily ([employer] brand)   Yes Historical Provider, MD   BiPAP Machine MISC repair/replace Bipap maintain 18/9CMH2O, Cflex=3,mask,tubing,filters,headgear,heated humidifier,all supplies PRN 1/28/21  Yes Historical Provider, MD   Psyllium (METAMUCIL PO) Take by mouth 3 times daily Takes powder   Yes Historical Provider, MD   blood glucose test strips (TRUE METRIX BLOOD GLUCOSE TEST) strip THREE TIMES A DAY 1/21/21  Yes Mendel Gola, MD   ferrous sulfate (IRON 325) 325 (65 Fe) MG tablet TAKE 1 TAB BY MOUTH EVERY MORNING  1/15/21  Yes Mendel Gola, MD   citalopram (CELEXA) 40 MG tablet TAKE 1 2 TABLET BY MOUTH TWICE A DAY  Patient taking differently: Take 20 mg by mouth 2 times daily  1/15/21  Yes Mendel Gola, MD   carvedilol (COREG) 3.125 MG tablet TAKE 1 TAB BY MOUTH TWICE A DAY ( IN THE MORNING AND BEDTIME )  1/15/21  Yes Mendel Gola, MD   losartan (COZAAR) 25 MG tablet TAKE 1 TAB BY MOUTH ONCE A DAY ( IN THE MORNING ) 1/15/21 Yes Manohar Cordero MD   magnesium oxide (MAG-OX) 400 MG tablet TAKE 1 TAB BY MOUTH TWICE A DAY ( IN THE MORNING AND BEDTIME ) 1/15/21  Yes Manohar Cordero MD   topiramate (TOPAMAX) 100 MG tablet Take 1 tablet by mouth nightly 11/30/20  Yes Montey Cogan, MD   dicyclomine (BENTYL) 10 MG capsule Take 1 capsule by mouth 2 times daily as needed (abdominal spasm) 11/25/20  Yes Manohar Cordero MD   albuterol (PROVENTIL) (2.5 MG/3ML) 0.083% nebulizer solution Take 3 mLs by nebulization every 6 hours as needed for Wheezing 11/3/20  Yes Gadiel Fischer MD   Icosapent Ethyl (VASCEPA) 1 g CAPS capsule Take 1 capsule by mouth 2 times daily 9/14/20  Yes Manohar Cordero MD   vitamin B-12 (CYANOCOBALAMIN) 100 MCG tablet Take 50 mcg by mouth daily   Yes Historical Provider, MD   ULTICARE MINI PEN NEEDLES 31G X 6 MM MISC USE AS DIRECTED  8/14/20  Yes Manohar Cordero MD   gabapentin (NEURONTIN) 300 MG capsule Take 1 capsule by mouth 3 times daily for 30 days. Patient taking differently: Take 300 mg by mouth 2 times daily. 7/30/20 10/5/21 Yes Martine Cho, VERONIKA - CNP   Lift Chair MISC by Does not apply route 9/24/19  Yes Manohar Cordero MD   Misc.  Devices (ADJUST BATH/SHOWER SEAT/BACK) MISC Use daily for shower 9/24/19  Yes Manohar Cordero MD   pantoprazole (PROTONIX) 40 MG tablet Take 1 tablet by mouth 2 times daily 3/15/19  Yes Peter Burn, DO   Alcohol Swabs (B-D SINGLE USE SWABS REGULAR) PADS THREE TIMES A DAY 12/12/18  Yes Manohar Cordero MD   nitroGLYCERIN (NITROSTAT) 0.4 MG SL tablet 1 under the tongue as needed for angina, may repeat q5mins for up three doses 8/28/18  Yes Historical Provider, MD   Blood Glucose Monitoring Suppl HARMEET Use daily to check BS 3/27/18  Yes Manohar Cordero MD   ipratropium-albuterol (DUONEB) 0.5-2.5 (3) MG/3ML SOLN nebulizer solution Inhale 3 mLs into the lungs every 4 hours 2/6/18  Yes Ney Olivares MD   Melatonin 10 MG TABS Take 10 mg by mouth nightly 10/19/17  Yes Manohar Cordero MD docusate sodium (COLACE) 100 MG capsule Take 1 capsule by mouth 2 times daily Please use while taking Percocet 9/10/15  Yes Jesusita Wills MD   SYMBICORT 160-4.5 MCG/ACT AERO   2 puffs As needed for sob 5/28/15  Yes Historical Provider, MD   OXYGEN Inhale 3 L into the lungs    Yes Historical Provider, MD   ibuprofen (ADVIL;MOTRIN) 800 MG tablet Take 1 tablet by mouth every 8 hours as needed for Pain 4/30/21 5/26/21  Lucretia Rogers MD   clopidogrel (PLAVIX) 75 MG tablet TAKE 1 TAB BY MOUTH ONCE A DAY ( IN THE MORNING ) -THIS MEDICINE MAY BE TAKENWITH OR WITHOUT FOOD  4/14/21   Kyleigh Holley MD   furosemide (LASIX) 20 MG tablet TAKE 1 TAB BY MOUTH ONCE A DAY AS NEEDED ( WEIGHT GAIN 3 LBS OVERNIGHT )  10/31/20   Kyleigh Holley MD   isosorbide dinitrate (ISORDIL) 30 MG tablet Take 30 mg by mouth    Historical Provider, MD   nystatin (MYCOSTATIN) 206649 UNIT/GM powder Apply 3 times daily.  6/25/20   Kyleigh Holley MD   lidocaine (LMX) 4 % cream Apply topically every 8 hrs as needed for pain 11/11/19   Kyleigh Holley MD   Compression Stockings MISC by Does not apply route Pressure between 20 - 25 . 9/11/17   Kyleigh Holley MD     CURRENT MEDS :  Scheduled Meds:   clopidogrel  75 mg Oral Daily    vitamin B-12  50 mcg Oral Daily    isosorbide dinitrate  30 mg Oral Daily    furosemide  20 mg Oral Daily    sodium chloride flush  5-40 mL IntraVENous 2 times per day    enoxaparin  40 mg SubCUTAneous Daily    atorvastatin  40 mg Oral Daily    [Held by provider] carvedilol  3.125 mg Oral BID    citalopram  20 mg Oral BID    gabapentin  300 mg Oral BID    insulin glargine  50 Units SubCUTAneous BID    ipratropium-albuterol  3 mL Inhalation Q4H    [Held by provider] losartan  25 mg Oral Daily    magnesium oxide  400 mg Oral BID    melatonin  10 mg Oral Nightly    pantoprazole  40 mg Oral BID    budesonide-formoterol  2 puff Inhalation Daily    topiramate  100 mg Oral Nightly    insulin lispro  0-12 Units SubCUTAneous TID WC    insulin lispro  0-6 Units SubCUTAneous Nightly    influenza virus vaccine  0.5 mL IntraMUSCular Prior to discharge       Continuous Infusions:   sodium chloride Stopped (10/07/21 0836)    dextrose             REVIEW OF SYSTEMS:  CONSTITUTIONAL:  negative for  fevers, chills, sweats, fatigue, malaise, anorexia and weight loss  EYES:  negative for  double vision, blurred vision, dry eyes, eye discharge and redness  HEENT:  negative for  hearing loss, tinnitus, ear drainage, earaches and nasal congestion  RESPIRATORY:  See hpi  CARDIOVASCULAR:  negative for  chest pain, palpitations, orthopnea, PND  GASTROINTESTINAL:  negative for nausea, vomiting, change in bowel habits, diarrhea, constipation, abdominal pain, pruritus, abdominal mass and abdominal distention      HEMATOLOGIC/LYMPHATIC:  negative for easy bruising, bleeding, lymphadenopathy, petechiae and swelling/edema  ALLERGIC/IMMUNOLOGIC:  negative for recurrent infections, urticaria and drug reactions  ENDOCRINE:  negative for heat intolerance, cold intolerance, tremor, weight changes and change in bowel habits  MUSCULOSKELETAL:  negative for  myalgias, arthralgias, pain, joint swelling, stiff joints and decreased range of motion  NEUROLOGICAL:  negative for headaches, dizziness, seizures, memory problems, speech problems, visual disturbance and coordination problems  BEHAVIOR/PSYCH:  negative for poor appetite, increased appetite, decreased sleep, increased sleep, decreased energy level, increased energy level and poor concentration   skin - no rash no dermatitis     Vitals:  BP (!) 120/53   Pulse 69   Temp 97.8 °F (36.6 °C) (Oral)   Resp 17   Ht 5' 2\" (1.575 m)   Wt 250 lb (113.4 kg)   LMP  (LMP Unknown)   SpO2 97%   BMI 45.73 kg/m²     PHYSICAL EXAM:  General Appearance:    Alert, cooperative, no distress, appears stated age   Head:    Normocephalic, without obvious abnormality, atraumatic      Eyes:    PERRL, conjunctiva/corneas clear, EOM's intact   Ears:    Normal TM's and external ear canals, both ears   Nose:   Nares normal, septum midline, mucosa normal, no drainage        or sinus tenderness   Throat:   Lips, mucosa, and tongue normal; teeth and gums normal   Neck:   Short thick neck, low hanging soft palate, large tongue, large uvula. No adenopathy, no goiter,    Back:     Symmetric, no curvature, ROM normal, no CVA tenderness   Lungs:         Prolonged expiratory phase bilaterally no wheezing no rales   Chest Wall:    No tenderness or deformity      Heart:    Regular rate and rhythm, S1 and S2 normal, no murmur, rub        or gallop no rvh                           Abdomen:                                                 Pulses:                              Skin:                  Lymph nodes:                    Neurologic:                  Soft, non-tender, bowel sounds active all four quadrants,     no masses, no organomegaly         2+ and symmetric all extremities     Skin color, texture, turgor normal, no rashes or lesions       Cervical, supraclavicular not enlarged or matted or tender      CNII-XII intact, normal strength 5/5 . Clubbing No  Lower ext edema Yes1+   [] , 2 +  [] , 3+   []  Upper ext edema No               CBC:   Recent Labs     10/05/21  1852 10/06/21  0853 10/07/21  0623   WBC 7.2 10.4 9.0   HGB 14.4 14.0 13.8    179 141     BMP:    Recent Labs     10/05/21  1852 10/06/21  0847 10/08/21  0657    135 138   K 4.2 4.9 5.2   CL 96* 98 101   CO2 27 26 29   BUN 17 22 19   CREATININE 1.01* 1.35* 1.12*   GLUCOSE 84 106* 140*     Hepatic:   Recent Labs     10/05/21  1852 10/06/21  0847   AST 56* 48*   ALT 39* 34*   BILITOT 0.39 0.27*   ALKPHOS 84 80     Amylase:   Lab Results   Component Value Date    AMYLASE 53 03/06/2016     Lipase:   Lab Results   Component Value Date    LIPASE 55 04/30/2021     Troponin: No results for input(s): TROPONINI in the last 72 hours.   BNP: No results for input(s): BNP in the last 72 hours. Lipids: No results for input(s): CHOL, HDL in the last 72 hours. Invalid input(s): LDLCALCU  ABGs:   Lab Results   Component Value Date    PO2ART 61.3 11/21/2011     INR: No results for input(s): INR in the last 72 hours. Thyroid:   Lab Results   Component Value Date    TSH 2.14 06/30/2020     Urinalysis: No results for input(s): BACTERIA, BLOODU, CLARITYU, COLORU, PHUR, PROTEINU, RBCUA, SPECGRAV, BILIRUBINUR, NITRU, WBCUA, LEUKOCYTESUR, GLUCOSEU in the last 72 hours. XR CHEST (2 VW)    Result Date: 10/5/2021  No evidence of acute cardiopulmonary disease     XR CHEST PORTABLE    Result Date: 10/7/2021  Mild bibasilar airspace disease, though overall improved aeration in the left perihilar region and lung base. XR CHEST PORTABLE    Result Date: 10/6/2021  New left infrahilar opacity, which may be related to atelectasis or small infiltrate. The pulmonary vasculature appears unremarkable.         IMPRESSION:    Patient Active Problem List   Diagnosis Code    Chronic pain of left knee M25.562, G89.29    Type 2 diabetes mellitus with diabetic polyneuropathy, with long-term current use of insulin ([facility]) E11.42, Z79.4    Nonintractable migraine, unspecified migraine type G43.009    Coronary artery disease due to lipid rich plaque I25.10, I25.83    DDD (degenerative disc disease), lumbar M51.36    Chronic, continuous use of opioids F11.90    DDD (degenerative disc disease), cervical M50.30    Osteoarthritis of cervical spine M47.812    Medication monitoring encounter Z51.81    GERD (gastroesophageal reflux disease) K21.9    HTN (hypertension), benign I10    Mixed hyperlipidemia E78.2    Insomnia G47.00    Lumbosacral spondylosis without myelopathy M47.817    Sacroiliitis, not elsewhere classified (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 A    Chronic migraine without aura without status migrainosus, not intractable G43.709    Morbid (severe) obesity with alveolar hypoventilation ([facility]) E66.2    Lung nodule R91.1    Neuropathy G62.9    YAJAIRA (acute kidney injury) ([facility] [address] N17.9    Obstructive sleep apnea syndrome G47.33    Asthma with COPD ([facility]) J44.9    Gastroesophageal reflux disease with esophagitis K21.00    Obesity, Class III, BMI 40-49.9 (morbid obesity) ([facility]) E66.01    Stage 3 chronic kidney disease ([facility]) N18.30    Benign hypertension with CKD (chronic kidney disease) stage III ([facility]) I12.9, N18.30    Secondary diabetes mellitus with stage 3 chronic kidney disease (GFR 30-59) ([facility]) E13.22, N18.30    CKD (chronic kidney disease) stage 3, GFR 30-59 ml/min N18.30    Episode of altered cognition R41.89    Lumbar radiculopathy, chronic M54.16    Sessile colonic polyp K63.5    Gastroesophageal reflux disease K21.9    Morbid obesity ([facility]) E66.01    Adenomatous polyp of ascending colon D12.2    Irritable bowel syndrome with both constipation and diarrhea K58.2    Abdominal bloating R14.0    Long term (current) use of insulin ([facility]) Z79.4    Major depressive disorder, single episode, unspecified F32.9    Permanent atrial fibrillation ([facility]) I48.21    Polyneuropathy, unspecified G62.9    Other chronic pain G89.29    Opioid use, unspecified, uncomplicated Q95.92    Chest pain R07.9    Atypical chest pain R07.89    Hypotension I95.9    Hypoxia R09.02    Left ventricular diastolic dysfunction S48.4    Transaminasemia R74.01    Acute respiratory failure with hypoxia and hypercarbia ([facility]) J96.01, J96.02    Macrocytosis D75.89     COPD without exacerbation  HIGINIO  Overlap syndrome  :                PLAN:      X-ray chest not impressive for pneumonia. WBC normal afebrile no purulent sputum.   Continue bronchodilator therapy with Symbicort and albuterol as needed  Continue supplemental oxygen  BiPAP at night  Okay to DC  will sign off        I hope this updates you on my evaluation and clinical thinking. Thank you for allowing me to participate in his care.      Sincerely,      Electronically signed by Lexx Hoffmann MD on 10/8/2021 at 11:11 AM

## 2021-10-08 NOTE — PROGRESS NOTES
[facility]  Office: [address], DO, Spencer Marina, DO, Wilber Lopez, DO, Keshav Lindo Blood, DO, David Regan MD, Stephanie White MD, Cherie Pierre MD, Dolores Velasquez MD, Allan Shahid MD, Eduarda Maddox MD, Shellie Barros MD, Drew Salguero, DO, Ar Altman, DO, Kamron More MD,  Sarahi Miller, DO, Miller Winkler MD, Juanita Wan MD, Vivi Henson MD, Franklin Carter MD, Brittney West MD, Romeo Daniels MD, Micha Longo [facility], [facility], CNP, Evelyne Kaiser, CNP, Xiao Santiago, CNS, Nettie Wu, CNP, Eduardo Minor, CNP, Kelley Genao, CNP, Arpan Martinez, CNP, Elana Pabon, CNP, Christal Chaidez PA-C, Charmel Osgood, [facility], Briana Coughlin, CNP, Dorcas Hercules, CNP, Nicolle Elkins, CNP, Bethanie Miller, CNP, Tiney Schilder, CNP, Marlyne Osgood, [facility], Harman Cross, [address] [facility]    Progress Note    10/8/2021    11:24 AM    Name:   Na Garcia  MRN:     [id number]     [place]:      [de-identified]   Room:   2016/2016-01   Day:  2  Admit Date:  10/5/2021  6:18 PM    PCP:   Soraya Tom MD  Code Status:  Full Code    Subjective:     C/C:   Chief Complaint   Patient presents with    Chest Pain     x1 wk     Interval History Status:   Still feels weak  Lying flat  Less short of breath  Doing okay with diet  Less dyspnea on exertion  Using BiPAP at night    Data Base Updates:  Has been in negative fluid balance    Gelycvq552Erld mg/dL     CYJ24ra/dL CREATININE1. 12High mg/dL   Bun/Cre RatioNOT REPORTED   Calcium8. 4Low       Brief History:     The patient has been evaluated at the request of the observation service for admission      History of Present Illness:  Na Garcia is a 69 y.o.  female who presents with Chest Pain (x1 wk)     Patient was admitted thru ER to the Observation unit with:  \"Mariangel Chandler a  75 y. o. female with extensive past medical history including diabetes, CHF, COPD, CKD, and TIA on home oxygen who kidney disease) stage 3, GFR 30-59 ml/min ([facility]), COPD (chronic obstructive pulmonary disease) ([facility]), Degeneration of lumbar or lumbosacral intervertebral disc, Depression, DM (diabetes mellitus) ([facility] Emphysema of lung ([address] Gastritis, GERD (gastroesophageal reflux disease), Hearing loss, Hematuria, Hiatal hernia, HTN (hypertension), benign, Hypertriglyceridemia, Incontinence of urine, Irritable bowel syndrome with both constipation and diarrhea, Knee arthropathy, Lumbosacral spondylosis without myelopathy, Migraine headache, Mumps, Neuropathy, Obesity, On home oxygen therapy, HIGINIO on CPAP, Otitis media, Secondary diabetes mellitus with stage 3 chronic kidney disease (GFR 30-59) ([facility] Stroke ([facility] Tinnitus, Type 2 diabetes mellitus without complication ([facility] Type II or unspecified type diabetes mellitus without mention of complication, not stated as uncontrolled, UTI (lower urinary tract infection), and Varicose vein. Social History:   reports that she quit smoking about 21 years ago. Her smoking use included cigarettes. She has a 123.00 pack-year smoking history. She has never used smokeless tobacco. She reports that she does not drink alcohol and does not use drugs. Family History:   Family History   Problem Relation Age of Onset    Diabetes Mother     Heart Disease Mother     Stomach Cancer Father     Diabetes Maternal Grandmother         also aunts and uncles (maternal)    Emphysema Sister        Review of Systems:     Review of Systems   Constitutional: Positive for activity change (Still diminished) and fatigue (Poor exercise capacity). Respiratory: Positive for shortness of breath (VENTURA). Negative for cough. Cardiovascular: Positive for leg swelling. Negative for chest pain and palpitations. Gastrointestinal: Negative for abdominal pain, nausea and vomiting. Genitourinary: Negative for flank pain and hematuria. Musculoskeletal: Positive for back pain and neck pain.         The patient has chronic arthralgias and myalgias  No new musculoskeletal complaints        Physical Examination:        Vitals  BP (!) 120/53   Pulse 69   Temp 97.8 °F (36.6 °C) (Oral)   Resp 17   Ht 5' 2\" (1.575 m)   Wt 250 lb (113.4 kg)   LMP  (LMP Unknown)   SpO2 97%   BMI 45.73 kg/m²   Temp (24hrs), Av.3 °F (36.8 °C), Min:97.3 °F (36.3 °C), Max:99.4 °F (37.4 °C)    Recent Labs     10/07/21  1106 10/07/21  1638 10/07/21  2041 10/08/21  0724   POCGLU 89 157* 192* 135*       Physical Exam  Vitals reviewed. Constitutional:       General: She is not in acute distress. Appearance: She is not diaphoretic. HENT:      Head: Normocephalic. Nose: Nose normal.   Eyes:      General: No scleral icterus. Conjunctiva/sclera: Conjunctivae normal.   Neck:      Trachea: No tracheal deviation. Cardiovascular:      Rate and Rhythm: Normal rate and regular rhythm. Pulmonary:      Effort: Pulmonary effort is normal. No respiratory distress. Breath sounds: Rhonchi and rales present. No wheezing. Chest:      Chest wall: No tenderness. Abdominal:      General: Bowel sounds are normal. There is no distension. Palpations: Abdomen is soft. Tenderness: There is no abdominal tenderness. Musculoskeletal:         General: No tenderness. Cervical back: Neck supple. Right lower leg: Edema present. Left lower leg: Edema present. Comments: 1-2/4 edema at the ankles    Skin:     General: Skin is warm and dry. Medications: Allergies: Allergies   Allergen Reactions    Robitussin [Guaifenesin] Anaphylaxis    Codeine Swelling    Compazine [Prochlorperazine Maleate] Hives and Swelling    Iodides Itching    Morphine Other (See Comments)     hallucinations    Moxifloxacin      Other reaction(s): Intolerance-unknown  Other reaction(s):  Intolerance-unknown    Reglan [Metoclopramide] Nausea Only    Avelox [Moxifloxacin Hcl In Nacl] Rash     Dermatitis      Cipro Xr Rash dermatitis    Sulfa Antibiotics Rash       Current Meds:   Scheduled Meds:    clopidogrel  75 mg Oral Daily    vitamin B-12  50 mcg Oral Daily    isosorbide dinitrate  30 mg Oral Daily    furosemide  20 mg Oral Daily    sodium chloride flush  5-40 mL IntraVENous 2 times per day    enoxaparin  40 mg SubCUTAneous Daily    atorvastatin  40 mg Oral Daily    [Held by provider] carvedilol  3.125 mg Oral BID    citalopram  20 mg Oral BID    gabapentin  300 mg Oral BID    insulin glargine  50 Units SubCUTAneous BID    ipratropium-albuterol  3 mL Inhalation Q4H    [Held by provider] losartan  25 mg Oral Daily    magnesium oxide  400 mg Oral BID    melatonin  10 mg Oral Nightly    pantoprazole  40 mg Oral BID    budesonide-formoterol  2 puff Inhalation Daily    topiramate  100 mg Oral Nightly    insulin lispro  0-12 Units SubCUTAneous TID WC    insulin lispro  0-6 Units SubCUTAneous Nightly    influenza virus vaccine  0.5 mL IntraMUSCular Prior to discharge     Continuous Infusions:    sodium chloride Stopped (10/07/21 0836)    dextrose       PRN Meds: sodium chloride flush, sodium chloride, ondansetron **OR** ondansetron, acetaminophen, albuterol, dicyclomine, oxyCODONE-acetaminophen, glucose, dextrose, glucagon (rDNA), dextrose    Data:     I/O (24Hr):     Intake/Output Summary (Last 24 hours) at 10/8/2021 1124  Last data filed at 10/8/2021 0600  Gross per 24 hour   Intake 850 ml   Output 1500 ml   Net -650 ml       Labs:  Hematology:  Recent Labs     10/05/21  1852 10/06/21  0853 10/07/21  0623   WBC 7.2 10.4 9.0   RBC 4.84 4.54 4.50   HGB 14.4 14.0 13.8   HCT 47.9* 44.7 49.0*   MCV 99.0 98.5 108.9*   MCH 29.8 30.8 30.7   MCHC 30.1 31.3 28.2*   RDW 12.7 12.7 12.5    179 141   MPV 11.5 11.9 12.2     Chemistry:  Recent Labs     10/05/21  1852 10/06/21  0847 10/08/21  0657    135 138   K 4.2 4.9 5.2   CL 96* 98 101   CO2 27 26 29   GLUCOSE 84 106* 140*   BUN 17 22 19   CREATININE 1.01* 1.35* 1.12*   ANIONGAP 13 11 8*   LABGLOM 54* 39* 48*   GFRAA >60 47* 58*   CALCIUM 9.7 9.5 8.4*   PROBNP 118 145  --    TROPHS 10 10  --      Recent Labs     10/05/21  1852 10/05/21  2321 10/06/21  0847 10/06/21  1114 10/07/21  0749 10/07/21  0853 10/07/21  1106 10/07/21  1638 10/07/21  2041 10/08/21  0724   PROT 7.4  --  7.0  --   --   --   --   --   --   --    LABALBU 3.9  --  3.6  --   --   --   --   --   --   --    AST 56*  --  48*  --   --   --   --   --   --   --    ALT 39*  --  34*  --   --   --   --   --   --   --    ALKPHOS 84  --  80  --   --   --   --   --   --   --    BILITOT 0.39  --  0.27*  --   --   --   --   --   --   --    POCGLU  --    < >  --    < > 44* 126* 89 157* 192* 135*    < > = values in this interval not displayed. ABG:  Lab Results   Component Value Date    POCPH 7.239 10/06/2021    PH 7.346 11/21/2011    POCPCO2 77.6 10/06/2021    PCO2 49.8 11/21/2011    POCPO2 137.2 10/06/2021    PO2ART 61.3 11/21/2011    POCHCO3 33.1 10/06/2021    SLY7OTI 26.5 11/21/2011    NBEA NOT REPORTED 10/06/2021    PBEA 3 10/06/2021    HTM5UBE NOT REPORTED 10/06/2021    APRF7HPN 98 10/06/2021    L3KDSSGU 89.7 11/21/2011    FIO2 INFORMATION NOT PROVIDED 10/06/2021     Lab Results   Component Value Date/Time    SPECIAL NOT REPORTED 03/14/2019 04:35 PM     Lab Results   Component Value Date/Time    CULTURE NO SIGNIFICANT GROWTH 03/14/2019 04:35 PM       Radiology:  XR CHEST (2 VW)    Result Date: 10/5/2021  No evidence of acute cardiopulmonary disease     XR CHEST PORTABLE    Result Date: 10/7/2021  Mild bibasilar airspace disease, though overall improved aeration in the left perihilar region and lung base. XR CHEST PORTABLE    Result Date: 10/6/2021  New left infrahilar opacity, which may be related to atelectasis or small infiltrate. The pulmonary vasculature appears unremarkable.        Assessment:        Primary Problem  Acute respiratory failure with hypoxia and hypercarbia [facility]    Active Hospital Problems    Diagnosis Date Noted    Type 2 diabetes mellitus with diabetic polyneuropathy, with long-term current use of insulin ([facility] [E11.42, Z79.4]      Priority: High    Macrocytosis [D75.89] 10/07/2021    Hypotension [I95.9] 10/06/2021    Hypoxia [R09.02] 10/06/2021    Left ventricular diastolic dysfunction [I65.3] 10/06/2021    Transaminasemia [R74.01] 10/06/2021    Acute respiratory failure with hypoxia and hypercarbia (HCC) [J96.01, J96.02] 10/06/2021    Atypical chest pain [R07.89] 10/05/2021    Chest pain [R07.9] 04/05/2021    Morbid obesity ([facility] [E66.01] 01/26/2021    Obesity, Class III, BMI 40-49.9 (morbid obesity) [facility] [E66.01] 07/16/2018    Asthma with COPD ([facility] [J44.9] 07/16/2018    YAJAIRA (acute kidney injury) ([facility] [N17.9] 02/01/2018    Obstructive sleep apnea syndrome [G47.33] 02/01/2018    Morbid (severe) obesity with alveolar hypoventilation (HCC) [E66.2] 12/07/2016    GERD (gastroesophageal reflux disease) [K21.9] 06/28/2014    HTN (hypertension), benign [I10] 06/28/2014         Plan:        Pulmonary f/u Dr Shanti Burrows  Cardiology f/u [facility]  Optimize cardio-pulmonary function  Respiratory Therapy and Bronchodilators prn   BiPAP   Oxygen supplementation as needed  Avoid respiratory depressants  Glycemic contol - monitor and control blood sugars  Observe for symptomatic hypoglycemia  Blood Pressure - Monitor and control  Observe for symptomatic hypotension  Reflux precautions  Risk factor management / weight loss   YAJAIRA: Check bun and creatinine  Trend LFT  Check B12/folate  PT/OT  DVT prophylaxis   Discharge planning  Patient lives alone  At this time she is uncomfortable returning home and is requesting placement  Med rec done  MAY done  Will discharge when arrangements complete and ok with other services.   Follow-up with PCP in one week, Juan Alberto Shaw MD  Notify PCP of discharge     IP CONSULT TO RESPIRATORY CARE  IP CONSULT TO INTERNAL MEDICINE  IP CONSULT TO HOSPITALIST  IP CONSULT TO IV TEAM  IP CONSULT TO CRITICAL CARE  IP CONSULT TO CARDIOLOGY  IP CONSULT TO [facility],   10/8/2021  11:24 AM

## 2021-10-08 NOTE — PLAN OF CARE
Problem: Pain:  Goal: Pain level will decrease  Description: Pain level will decrease  Outcome: Ongoing     Problem: Pain:  Goal: Control of acute pain  Description: Control of acute pain  Outcome: Ongoing     Problem: Falls - Risk of:  Goal: Will remain free from falls  Description: Will remain free from falls  Outcome: Ongoing     Problem: Falls - Risk of:  Goal: Absence of physical injury  Description: Absence of physical injury  Outcome: Ongoing    Will continue to monitor patient

## 2021-10-08 NOTE — PROGRESS NOTES
Physical Therapy    Facility/Department: [facility]  Initial Assessment    NAME: Kostas Michael  : 1950  MRN: [id number]    Date of Service: 10/8/2021  Chief Complaint   Patient presents with    Chest Pain     x1 wk    copied from chart:  Kostas Michael is a  69 y.o. female with extensive past medical history including diabetes, CHF, COPD, CKD, and TIA on home oxygen who presents to the ED for 3 days of intermittent \"electrical\" shooting pain in her left chest that radiates to her back under a scapula  Discharge Recommendations:  Patient would benefit from continued therapy after discharge   PT Equipment Recommendations  Equipment Needed: Yes (writer recommends 24 hour use of RW)  Mobility Devices: Julianne Araujoer: Rolling    Assessment   Body structures, Functions, Activity limitations: Decreased functional mobility ; Decreased posture;Decreased endurance;Decreased ROM; Decreased strength;Decreased balance  Assessment: Pt with mobility deficits requiring mod-A to perform bed mobility, CGA to perform sit<>stand and ambulate 2 feet side stepping at the EOB. Pt with decreased tolerance to standing activity this date secondary to increased BLE pain. Pt most limited secondary to decreased endurance, decreased BLE strength, decreased balance. Pt would benefit from additional therapy upon discharge. Pt would be unsafe to return to prior living arrangements based on today's therapy session. Prognosis: Good  Decision Making: Medium Complexity  PT Education: Goals;Transfer Training;PT Role;Functional Mobility Training;General Safety;Gait Training  REQUIRES PT FOLLOW UP: Yes  Activity Tolerance  Activity Tolerance: Patient limited by fatigue;Patient limited by endurance  Activity Tolerance: Pt on 4 lpm O2 via NC this date, oxygen saturation varied from 91% in sitting EOB to 88% in standing, quickly recovered upon return to sitting, second stand O2 saturation was 92%.        Patient Diagnosis(es): The primary encounter diagnosis was Other chest pain. Diagnoses of Nausea, DDD (degenerative disc disease), cervical, Lumbosacral spondylosis without myelopathy, Sacroiliitis, not elsewhere classified ([address] and Lumbar radiculopathy, chronic were also pertinent to this visit. has a past medical history of Abdominal bloating, Adenomatous polyp of ascending colon, Allergic rhinitis, Anxiety, Arthritis, Asthma, Atrial fibrillation (HCC), Back pain, Benign hypertension with CKD (chronic kidney disease) stage III ([address] CAD (coronary artery disease), Caffeine use, CHF (congestive heart failure) ([address] Chronic kidney disease, CKD (chronic kidney disease) stage 3, GFR 30-59 ml/min ([facility]), COPD (chronic obstructive pulmonary disease) ([address] Degeneration of lumbar or lumbosacral intervertebral disc, Depression, DM (diabetes mellitus) ([address] Emphysema of lung ([address] Gastritis, GERD (gastroesophageal reflux disease), Hearing loss, Hematuria, Hiatal hernia, HTN (hypertension), benign, Hypertriglyceridemia, Incontinence of urine, Irritable bowel syndrome with both constipation and diarrhea, Knee arthropathy, Lumbosacral spondylosis without myelopathy, Migraine headache, Mumps, Neuropathy, Obesity, On home oxygen therapy, HIGINIO on CPAP, Otitis media, Secondary diabetes mellitus with stage 3 chronic kidney disease (GFR 30-59) ([address] Stroke ([address] Tinnitus, Type 2 diabetes mellitus without complication ([address] Type II or unspecified type diabetes mellitus without mention of complication, not stated as uncontrolled, UTI (lower urinary tract infection), and Varicose vein. has a past surgical history that includes Cholecystectomy (2007); Carpal tunnel release (Right); Tympanoplasty; Dilation & curettage (1979); Cystocopy (9/25/2013); Cataract removal with implant (Bilateral); Tonsillectomy; Incontinence surgery; ablation of dysrhythmic focus (2000); Upper gastrointestinal endoscopy (03/2016); eye surgery;  Tubal ligation; other surgical history (09/10/15); Insertable Cardiac Monitor (12/04/2015); Tympanoplasty; Colonoscopy; Colonoscopy (04/10/2017); pr colsc flx w/removal lesion by hot bx forceps (N/A, 4/10/2017); Tympanostomy tube placement (08/21/2017); Upper gastrointestinal endoscopy (N/A, 3/2/2021); and Colonoscopy (N/A, 3/2/2021). Restrictions  Restrictions/Precautions  Restrictions/Precautions: Up as Tolerated  Required Braces or Orthoses?: No  Vision/Hearing  Vision: Impaired  Vision Exceptions: Wears glasses at all times  Hearing: Exceptions to [facility]  Hearing Exceptions: Bilateral hearing aid     Subjective  General  Patient assessed for rehabilitation services?: Yes  Response To Previous Treatment: Not applicable  Family / Caregiver Present: No  Follows Commands: Within Functional Limits  Subjective  Subjective: Pt supine in bed and agreeable to therapy this morning, RN agreeable to therapy.   Pt pleasant and cooperative throughout today's session  Pain Screening  Patient Currently in Pain: Denies  Vital Signs  Patient Currently in Pain: Denies       Social/Functional History  Social/Functional History  Lives With: Alone  Type of Home: Apartment (8th floor with elevator)  Home Layout: One level  Home Access: Level entry  Bathroom Shower/Tub: Tub/Shower unit  Bathroom Toilet: Standard  Bathroom Equipment: Grab bars in shower, Shower chair  Home Equipment: 4 wheeled walker, Electric scooter  Receives Help From: Friend(s), Personal care attendant (friends that live nearby check in on her frequently, stay nearby when she showers)  ADL Assistance: [facility] [facility]: Needs assistance  Homemaking Responsibilities: Yes (aide or friends assist with cleaning, cooking)  Ambulation Assistance: Independent  Transfer Assistance: Independent  Active : No  Patient's  Info: public transportation  Occupation: Retired  Leisure & Hobbies: poker  Cognition   Cognition  Overall Cognitive Status: WFL    Objective Plan  Times per week: 5-6  Times per day: Daily  Current Treatment Recommendations: Strengthening, Transfer Training, Endurance Training, ROM, Balance Training, Gait Training, Functional Mobility Training, Stair training, Safety Education & Training, Home Exercise Program, Equipment Evaluation, Education, & procurement, Patient/Caregiver Education & Training  Safety Devices  Type of devices: Patient at risk for falls, Left in bed, Call light within reach, Gait belt, Nurse notified  Restraints  Initially in place: No                                     AM-PAC Score     AM-PAC Inpatient Mobility without Stair Climbing Raw Score : 11 (10/08/21 1309)  AM-PAC Inpatient without Stair Climbing T-Scale Score : 35.66 (10/08/21 1309)  Mobility Inpatient CMS 0-100% Score: 67.36 (10/08/21 1309)  Mobility Inpatient without Stair CMS G-Code Modifier : CL (10/08/21 1309)       Goals  Short term goals  Time Frame for Short term goals: 14 visits  Short term goal 1: Pt will ambulate 50 feet with a RW and SBA to increase functional independence. Short term goal 2: Pt will tolerate a 30 minute therapy session to promote increased endurance. Short term goal 3: Pt will demonstrate fair+ standing balance to decrease fall risk. Short term goal 4: Pt will perform sit<>stand, stand<>sit transfers with supervision to increase functional independence. Short term goal 5: Pt will perform bed mobility with supervision to increase functional independence.   Patient Goals   Patient goals : return home       Therapy Time   Individual Concurrent Group Co-treatment   Time In 0840         Time Out 0909         Minutes 29         Timed Code Treatment Minutes: JOAQUÍN Santiago [address] [place] [address]

## 2021-10-08 NOTE — FLOWSHEET NOTE
SPIRITUAL CARE DEPARTMENT - [facility] [address]  PROGRESS NOTE    Shift date: 10/7/21  Shift day: Thursday   Shift # 2    Room # 2016/2016-01   Name: Antoine Pastor            Age: 69 y.o. Gender: female          Jehovah's witness: Unknown   Place of Cheondoism: Unknown    Referral: Routine Visit    Admit Date & Time: 10/5/2021  6:18 PM    PATIENT/EVENT DESCRIPTION:  Antoine Pastor is a 69 y.o. female in room 2016. SPIRITUAL ASSESSMENT/INTERVENTION:  Pt has acute respiratory failure with hypoxia and hypercarbia.  entered room and brought literature for Pt.  dropped off prayer cards and a Large Print Daily Bread.  also offered to prayer with the Pt. Pt accepted the offer and the  then prayed for the Pt. Pt was grateful for the spiritual care and literature. SPIRITUAL CARE FOLLOW-UP PLAN:  Chaplains will remain available to offer spiritual and emotional support as needed. Electronically signed by Ebonie Headley, on 10/7/2021 at 8:13 PM.  [facility]  [phone]       10/07/21 2011   Encounter Summary   Services provided to: Patient   Referral/Consult From: [facility] Unknown   Continue Visiting   (10/7/21)   Volunteer Visit No   Complexity of Encounter Moderate   Length of Encounter 15 minutes   Spiritual Assessment Completed Yes   Routine   Type Follow up   Assessment Calm; Hopeful;Coping; Approachable  (Joined in Prayer)   Intervention Active listening;Nurtured hope;Prayer;Sustaining presence/ Ministry of presence;Provided reading materials/devotional materials   Outcome Acceptance;Comfort;Expressed gratitude;Coping;Encouraged; Hopeful;Receptive

## 2021-10-08 NOTE — PLAN OF CARE
Problem: Pain:  Description: Pain management should include both nonpharmacologic and pharmacologic interventions.   Goal: Pain level will decrease  10/8/2021 0854 by Lennox Bastos, RN  Outcome: Met This Shift  10/8/2021 0820 by Queen Missael RN  Outcome: Ongoing     Problem: Falls - Risk of:  Goal: Will remain free from falls  10/8/2021 0854 by Lennox Bastos, RN  Outcome: Met This Shift  10/8/2021 0820 by Queen Missael RN  Outcome: Ongoing

## 2021-10-08 NOTE — PLAN OF CARE
BRONCHOSPASM/BRONCHOCONSTRICTION     [x]         IMPROVE AERATION/BREATH SOUNDS  [x]   ADMINISTER BRONCHODILATOR THERAPY AS APPROPRIATE  [x]   ASSESS BREATH SOUNDS  []   IMPLEMENT AEROSOL/MDI PROTOCOL  [x]   PATIENT EDUCATION AS NEEDED   PROVIDE ADEQUATE OXYGENATION WITH ACCEPTABLE SP02/ABG'S    [x]  IDENTIFY APPROPRIATE OXYGEN THERAPY  [x]   MONITOR SP02/ABG'S AS NEEDED   [x]   PATIENT EDUCATION AS NEEDED     [x]  NON INVASIVE VENTILATION  [x]  PROVIDE OPTIMAL VENTILATION/ACCEPTABLE SP02  []  IMPLEMENT NON INVASIVE VENTILATION PROTOCOL  [x]  ASSESSMENT SKIN INTEGRITY  [x]  PATIENT EDUCATION AS NEEDED  [x]  BIPAP AS NEEDED

## 2021-10-08 NOTE — DISCHARGE INSTR - COC
Continuity of Care Form    Patient Name: Mayelin Dang   :  1950  MRN:  [id number]    Admit date:  10/5/2021  Discharge date:  10/09/2021    Code Status Order: Full Code   Advance Directives:      Admitting Physician:  Meghna Morales DO  PCP: Shaq Lei MD    Discharging Nurse: [facility] Unit/Room#:   Discharging Unit Phone Number: [phone]    Emergency Contact:   Extended Emergency Contact Information  Primary Emergency Contact: Aden Kirkland *HIPAA  Address: [address] Phone: [phone]  Work Phone: [phone]  Mobile Phone: [phone]  Relation: Child    Past Surgical History:  Past Surgical History:   Procedure Laterality Date    ABLATION OF DYSRHYTHMIC FOCUS      CARPAL TUNNEL RELEASE Right     CATARACT REMOVAL WITH IMPLANT Bilateral     CHOLECYSTECTOMY      COLONOSCOPY      COLONOSCOPY  04/10/2017    tubular adenomo polyp , mod. sigmoid diverticulosis, min. int. hemorrhoids    COLONOSCOPY N/A 3/2/2021    COLONOSCOPY WITH BIOPSY performed by Manuel Engel MD at [place] [address]  2013    DILATION  [address]    EYE SURGERY      laser    INCONTINENCE SURGERY      Percutaneous nerve stimulation Dr Alexia Galdamez 2013     INSERTABLE CARDIAC MONITOR  2015    [employer] REVEAL LINQ MODEL #[id number] MRI CONDTIONAL OK 3T.  IMMEDIATELY POST IMPLANT    OTHER SURGICAL HISTORY  09/10/15    removal of interstim    [place] COLSC FLX W/REMOVAL LESION BY HOT BX FORCEPS N/A 4/10/2017    COLONOSCOPY POLYPECTOMY HOT BIOPSY performed by Toya Camacho MD at [address] TYMPANOPLASTY      TYMPANOPLASTY      TYMPANOSTOMY TUBE PLACEMENT  2017    UPPER GASTROINTESTINAL ENDOSCOPY  2016    Dr Ce Martin N/A 3/2/2021    EGD BIOPSY performed by Manuel Engel MD at [facility]       Immunization History:   Immunization History Administered Date(s) Administered    COVID-19, Moderna, PF, 100mcg/0.5mL 02/10/2021, 03/10/2021    Influenza 10/18/2012, 10/07/2013    Influenza Virus Vaccine 10/02/2014, 10/20/2015    Influenza Whole 09/01/2010    Influenza, High Dose (Fluzone 65 yrs and older) 11/10/2016, 10/19/2017, 10/08/2018    Influenza, Quadv, IM, PF (6 mo and older Fluzone, Flulaval, Fluarix, and 3 yrs and older Afluria) 11/02/2019    Influenza, Quadv, adjuvanted, 65 yrs +, IM, PF (Fluad) 09/14/2020    Pneumococcal Conjugate 13-valent ([id number]) 06/15/2016    Pneumococcal Polysaccharide ([id number]) 01/01/2009, 09/11/2017       Active Problems:  Patient Active Problem List   Diagnosis Code    Chronic pain of left knee M25.562, G89.29    Type 2 diabetes mellitus with diabetic polyneuropathy, with long-term current use of insulin (HCC) E11.42, Z79.4    Nonintractable migraine, unspecified migraine type G43.009    Coronary artery disease due to lipid rich plaque I25.10, I25.83    DDD (degenerative disc disease), lumbar M51.36    Chronic, continuous use of opioids F11.90    DDD (degenerative disc disease), cervical M50.30    Osteoarthritis of cervical spine M47.812    Medication monitoring encounter Z51.81    GERD (gastroesophageal reflux disease) K21.9    HTN (hypertension), benign I10    Mixed hyperlipidemia E78.2    Insomnia G47.00    Lumbosacral spondylosis without myelopathy M47.817    Sacroiliitis, not elsewhere classified (HCC) M46.1    Carpal tunnel syndrome G56.00    Mixed stress and urge urinary incontinence N39.46    Bilateral low back pain with sciatica M54.40    Intractable migraine with aura without status migrainosus G43.119    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    Anxiety and depression F41.9, F32. A    Chronic migraine without aura without status migrainosus, not intractable G43.709    Morbid (severe) obesity with alveolar hypoventilation (HCC) E66.2    Lung nodule R91.1    Neuropathy G62.9    YAJAIRA (acute kidney injury) ([facility] N17.9    Obstructive sleep apnea syndrome G47.33    Asthma with COPD ([facility] J44.9    Gastroesophageal reflux disease with esophagitis K21.00    Obesity, Class III, BMI 40-49.9 (morbid obesity) ([facility]) E66.01    Stage 3 chronic kidney disease ([facility]) N18.30    Benign hypertension with CKD (chronic kidney disease) stage III ([facility]) I12.9, N18.30    Secondary diabetes mellitus with stage 3 chronic kidney disease (GFR 30-59) ([facility]) E13.22, N18.30    CKD (chronic kidney disease) stage 3, GFR 30-59 ml/min N18.30    Episode of altered cognition R41.89    Lumbar radiculopathy, chronic M54.16    Sessile colonic polyp K63.5    Gastroesophageal reflux disease K21.9    Morbid obesity ([facility]) E66.01    Adenomatous polyp of ascending colon D12.2    Irritable bowel syndrome with both constipation and diarrhea K58.2    Abdominal bloating R14.0    Long term (current) use of insulin ([facility]) Z79.4    Major depressive disorder, single episode, unspecified F32.9    Permanent atrial fibrillation ([facility]) I48.21    Polyneuropathy, unspecified G62.9    Other chronic pain G89.29    Opioid use, unspecified, uncomplicated K07.72    Chest pain R07.9    Atypical chest pain R07.89    Hypotension I95.9    Hypoxia R09.02    Left ventricular diastolic dysfunction F24.5    Transaminasemia R74.01    Acute respiratory failure with hypoxia and hypercarbia ([facility]) J96.01, J96.02    Macrocytosis D75.89       Isolation/Infection:   Isolation            No Isolation          Patient Infection Status       Infection Onset Added Last Indicated Last Indicated By Review Planned Expiration Resolved Resolved By    None active    Resolved    COVID-19 Rule Out 02/26/21 02/27/21 02/26/21 COVID-19 (Ordered)   02/28/21 Rule-Out Test Resulted    COVID-19 Rule Out 07/01/20 07/01/20 07/01/20 COVID-19 (Ordered)   07/01/20 Rule-Out Test Resulted            Nurse Assessment:  Last Vital Signs: BP (!) 120/53   Pulse 69   Temp 97.8 °F (36.6 °C) (Oral)   Resp 17   Ht 5' 2\" (1.575 m)   Wt 250 lb (113.4 kg)   LMP  (LMP Unknown)   SpO2 97%   BMI 45.73 kg/m²     Last documented pain score (0-10 scale): Pain Level: 0  Last Weight:   Wt Readings from Last 1 Encounters:   10/05/21 250 lb (113.4 kg)     Mental Status:  oriented, alert, coherent, logical, thought processes intact and able to concentrate and follow conversation    IV Access:  - None    Nursing Mobility/ADLs:  Walking   Assisted  Transfer  Assisted  Bathing  Assisted  Dressing  Assisted  Toileting  Assisted  Feeding  Assisted  Med Admin  Independent  Med Delivery   whole    Wound Care Documentation and Therapy:        Elimination:  Continence:   · Bowel: Yes  · Bladder: No  Urinary Catheter: None   Colostomy/Ileostomy/Ileal Conduit: No       Date of Last BM: 10/07/2021    Intake/Output Summary (Last 24 hours) at 10/8/2021 1116  Last data filed at 10/8/2021 0600  Gross per 24 hour   Intake 850 ml   Output 1500 ml   Net -650 ml     I/O last 3 completed shifts: In: 850 [P.O.:830; I.V.:20]  Out: 1500 [Urine:1500]    Safety Concerns: At Risk for Falls    Impairments/Disabilities:      Vision and Hearing    Nutrition Therapy:  Current Nutrition Therapy:   - Oral Diet:  General    Routes of Feeding: Oral  Liquids: No Restrictions  Daily Fluid Restriction: no  Last Modified Barium Swallow with Video (Video Swallowing Test): not done    Treatments at the Time of Hospital Discharge:   Respiratory Treatments: budesonide formoterol (SYMBICORT) 160/4.5 MCG/ACT inhaler 2 puffs, ipratropium albuterol (DUONEB) nebulizer solution 3ML every 4 hours  Oxygen Therapy:  is on oxygen at 4 L/min per nasal cannula.   Ventilator:    - No ventilator support BIPAP 18/9 at night    Rehab Therapies: Physical Therapy and Occupational Therapy  Weight Bearing Status/Restrictions: No weight bearing restirctions  Other Medical Equipment (for information only, NOT a DME order):  N/A  Other Treatments: skilled nursing    Patient's personal belongings (please select all that are sent with patient):  hearing aide, clothes, cellphone, shoes, glasses    RN SIGNATURE:  Electronically signed by Zac Victoria RN on 10/8/21 at 4:38 PM EDT    CASE MANAGEMENT/SOCIAL WORK SECTION    Inpatient Status Date: 10-6-2021    Readmission Risk Assessment Score:  Readmission Risk              Risk of Unplanned Readmission:  31           Discharging to Facility/ Agency   · Name: [facility]  · Address:  · Phone:  · Fax: [facility] [address]  Dialysis Facility (if applicable)   · Name:  · Address:  · Dialysis Schedule:  · Phone:  · Fax:    / signature: Electronically signed by Anna King RN on 10/8/21 at 11:16 AM EDT    PHYSICIAN SECTION    Prognosis: Fair    Condition at Discharge: Stable    Rehab Potential (if transferring to Rehab): Fair    Recommended Labs or Other Treatments After Discharge:   Pulmonary f/u Dr Lucrecia Palencia  Cardiology f/u [facility]  Optimize cardio-pulmonary function  Respiratory Therapy and Bronchodilators prn   BiPAP   Oxygen supplementation as needed  Avoid respiratory depressants  Glycemic contol - monitor and control blood sugars  Observe for symptomatic hypoglycemia  Blood Pressure - Monitor and control  Observe for symptomatic hypotension  Reflux precautions  Risk factor management / weight loss   YAJAIRA: Check bun and creatinine  Trend LFT  Check B12/folate  PT/OT  DVT prophylaxis   Med rec done  MAY done  Follow-up with PCP in one week, Boone Hargrove MD    Physician Certification: I certify the above information and transfer of Reginald Alarcon  is necessary for the continuing treatment of the diagnosis listed and that she requires [facility] for less 30 days.      Update Admission H&P:   Principal Problem:    Acute respiratory failure with hypoxia and hypercarbia (HCC)  Active Problems:    Type 2 diabetes mellitus with diabetic polyneuropathy, with long-term current use of insulin (HCC)    GERD (gastroesophageal reflux disease)    HTN (hypertension), benign    Morbid (severe) obesity with alveolar hypoventilation (HCC)    YAJAIRA (acute kidney injury) ([facility]    Obstructive sleep apnea syndrome    Asthma with COPD ([facility]    Obesity, Class III, BMI 40-49.9 (morbid obesity) ([facility]    Morbid obesity (HCC)    Chest pain    Atypical chest pain    Hypotension    Hypoxia    Left ventricular diastolic dysfunction    Transaminasemia    Macrocytosis  Resolved Problems:    * No resolved hospital problems.  *     PHYSICIAN SIGNATURE:  Electronically signed by Ayaka England DO on 10/8/21 at 11:22 AM EDT

## 2021-10-08 NOTE — CARE COORDINATION
Transitional planning-talked with patient-wanting SNF-list given-chose [facility]. Referral faxed and called to Skip Wright. [facility], started precert, will need rapid COVID before going. 1450 call from Skip Wright at [facility], can come 10-9-2021 after 12PM.    16[facility]. Notified patient. [address] [place] [address] notified Skip Wright at [facility]. HENS complete and in envelope. Script for percocets in envelope. AVS needs printed and faxed.     [address] [place] called son Rafy Jimenez and informed him of patient's discharge tomorrow at 1PM to [facility]

## 2021-10-09 VITALS
DIASTOLIC BLOOD PRESSURE: 56 MMHG | HEIGHT: 62 IN | RESPIRATION RATE: 19 BRPM | TEMPERATURE: 98.1 F | WEIGHT: 250 LBS | HEART RATE: 82 BPM | SYSTOLIC BLOOD PRESSURE: 116 MMHG | OXYGEN SATURATION: 92 % | BODY MASS INDEX: 46.01 KG/M2

## 2021-10-09 LAB
ANION GAP SERPL CALCULATED.3IONS-SCNC: 11 MMOL/L (ref 9–17)
BUN BLDV-MCNC: 14 MG/DL (ref 8–23)
BUN/CREAT BLD: ABNORMAL (ref 9–20)
CALCIUM SERPL-MCNC: 8.5 MG/DL (ref 8.6–10.4)
CHLORIDE BLD-SCNC: 99 MMOL/L (ref 98–107)
CO2: 27 MMOL/L (ref 20–31)
CREAT SERPL-MCNC: 0.98 MG/DL (ref 0.5–0.9)
D-DIMER QUANTITATIVE: 0.5 MG/L FEU
FOLATE: 15.4 NG/ML
GFR AFRICAN AMERICAN: >60 ML/MIN
GFR NON-AFRICAN AMERICAN: 56 ML/MIN
GFR SERPL CREATININE-BSD FRML MDRD: ABNORMAL ML/MIN/{1.73_M2}
GFR SERPL CREATININE-BSD FRML MDRD: ABNORMAL ML/MIN/{1.73_M2}
GLUCOSE BLD-MCNC: 115 MG/DL (ref 70–99)
GLUCOSE BLD-MCNC: 307 MG/DL (ref 65–105)
POTASSIUM SERPL-SCNC: 3.9 MMOL/L (ref 3.7–5.3)
SODIUM BLD-SCNC: 137 MMOL/L (ref 135–144)
VITAMIN B-12: 386 PG/ML (ref 232–1245)

## 2021-10-09 PROCEDURE — 2580000003 HC RX 258: Performed by: HEALTH CARE PROVIDER

## 2021-10-09 PROCEDURE — 6360000002 HC RX W HCPCS: Performed by: HEALTH CARE PROVIDER

## 2021-10-09 PROCEDURE — 36415 COLL VENOUS BLD VENIPUNCTURE: CPT

## 2021-10-09 PROCEDURE — 2700000000 HC OXYGEN THERAPY PER DAY

## 2021-10-09 PROCEDURE — 97530 THERAPEUTIC ACTIVITIES: CPT

## 2021-10-09 PROCEDURE — 97535 SELF CARE MNGMENT TRAINING: CPT

## 2021-10-09 PROCEDURE — 6370000000 HC RX 637 (ALT 250 FOR IP): Performed by: INTERNAL MEDICINE

## 2021-10-09 PROCEDURE — 94761 N-INVAS EAR/PLS OXIMETRY MLT: CPT

## 2021-10-09 PROCEDURE — 94640 AIRWAY INHALATION TREATMENT: CPT

## 2021-10-09 PROCEDURE — 6370000000 HC RX 637 (ALT 250 FOR IP): Performed by: HEALTH CARE PROVIDER

## 2021-10-09 PROCEDURE — 97166 OT EVAL MOD COMPLEX 45 MIN: CPT

## 2021-10-09 PROCEDURE — 85379 FIBRIN DEGRADATION QUANT: CPT

## 2021-10-09 PROCEDURE — 80048 BASIC METABOLIC PNL TOTAL CA: CPT

## 2021-10-09 PROCEDURE — 99239 HOSP IP/OBS DSCHRG MGMT >30: CPT | Performed by: INTERNAL MEDICINE

## 2021-10-09 PROCEDURE — 82947 ASSAY GLUCOSE BLOOD QUANT: CPT

## 2021-10-09 RX ADMIN — GABAPENTIN 300 MG: 300 CAPSULE ORAL at 08:52

## 2021-10-09 RX ADMIN — ENOXAPARIN SODIUM 40 MG: 40 INJECTION SUBCUTANEOUS at 08:52

## 2021-10-09 RX ADMIN — CLOPIDOGREL 75 MG: 75 TABLET, FILM COATED ORAL at 08:52

## 2021-10-09 RX ADMIN — INSULIN GLARGINE 50 UNITS: 100 INJECTION, SOLUTION SUBCUTANEOUS at 08:53

## 2021-10-09 RX ADMIN — INSULIN LISPRO 8 UNITS: 100 INJECTION, SOLUTION INTRAVENOUS; SUBCUTANEOUS at 11:34

## 2021-10-09 RX ADMIN — ISOSORBIDE DINITRATE 30 MG: 10 TABLET ORAL at 08:51

## 2021-10-09 RX ADMIN — DESMOPRESSIN ACETATE 40 MG: 0.2 TABLET ORAL at 08:52

## 2021-10-09 RX ADMIN — IPRATROPIUM BROMIDE AND ALBUTEROL SULFATE 3 ML: .5; 2.5 SOLUTION RESPIRATORY (INHALATION) at 09:12

## 2021-10-09 RX ADMIN — IPRATROPIUM BROMIDE AND ALBUTEROL SULFATE 3 ML: .5; 2.5 SOLUTION RESPIRATORY (INHALATION) at 12:48

## 2021-10-09 RX ADMIN — CITALOPRAM 20 MG: 20 TABLET, FILM COATED ORAL at 08:52

## 2021-10-09 RX ADMIN — IPRATROPIUM BROMIDE AND ALBUTEROL SULFATE 3 ML: .5; 2.5 SOLUTION RESPIRATORY (INHALATION) at 04:49

## 2021-10-09 RX ADMIN — FUROSEMIDE 20 MG: 20 TABLET ORAL at 08:52

## 2021-10-09 RX ADMIN — IPRATROPIUM BROMIDE AND ALBUTEROL SULFATE 3 ML: .5; 2.5 SOLUTION RESPIRATORY (INHALATION) at 00:41

## 2021-10-09 RX ADMIN — SODIUM CHLORIDE, PRESERVATIVE FREE 5 ML: 5 INJECTION INTRAVENOUS at 08:56

## 2021-10-09 RX ADMIN — MAGNESIUM GLUCONATE 500 MG ORAL TABLET 400 MG: 500 TABLET ORAL at 08:51

## 2021-10-09 RX ADMIN — OXYCODONE HYDROCHLORIDE AND ACETAMINOPHEN 1 TABLET: 5; 325 TABLET ORAL at 08:25

## 2021-10-09 RX ADMIN — PANTOPRAZOLE SODIUM 40 MG: 40 TABLET, DELAYED RELEASE ORAL at 08:51

## 2021-10-09 RX ADMIN — VITAM B12 50 MCG: 100 TAB at 08:51

## 2021-10-09 RX ADMIN — BUDESONIDE AND FORMOTEROL FUMARATE DIHYDRATE 2 PUFF: 160; 4.5 AEROSOL RESPIRATORY (INHALATION) at 09:24

## 2021-10-09 ASSESSMENT — PAIN SCALES - GENERAL
PAINLEVEL_OUTOF10: 0
PAINLEVEL_OUTOF10: 8

## 2021-10-09 ASSESSMENT — ENCOUNTER SYMPTOMS
VOMITING: 0
COUGH: 0
ABDOMINAL PAIN: 0
BACK PAIN: 1
NAUSEA: 0
SHORTNESS OF BREATH: 1

## 2021-10-09 ASSESSMENT — PAIN DESCRIPTION - LOCATION
LOCATION: HEAD
LOCATION: HEAD

## 2021-10-09 ASSESSMENT — PAIN DESCRIPTION - ONSET: ONSET: AWAKENED FROM SLEEP

## 2021-10-09 ASSESSMENT — PAIN DESCRIPTION - FREQUENCY: FREQUENCY: CONTINUOUS

## 2021-10-09 ASSESSMENT — PAIN DESCRIPTION - DESCRIPTORS
DESCRIPTORS: HEADACHE
DESCRIPTORS: HEADACHE

## 2021-10-09 ASSESSMENT — PAIN DESCRIPTION - PAIN TYPE
TYPE: ACUTE PAIN
TYPE: ACUTE PAIN

## 2021-10-09 NOTE — PLAN OF CARE
Problem: Pain:  Goal: Pain level will decrease  Description: Pain level will decrease  10/9/2021 0933 by Denise Hadley RN  Outcome: Completed  10/9/2021 0520 by Jannie Piña RN  Outcome: Ongoing  Goal: Control of acute pain  Description: Control of acute pain  10/9/2021 0933 by Denise Hadley RN  Outcome: Completed  10/9/2021 0520 by Jannie Piña RN  Outcome: Ongoing  Goal: Control of chronic pain  Description: Control of chronic pain  10/9/2021 0933 by Denise Hadley RN  Outcome: Completed  10/9/2021 0520 by Jannie Piña RN  Outcome: Ongoing     Problem: Falls - Risk of:  Goal: Will remain free from falls  Description: Will remain free from falls  10/9/2021 0933 by Denise Hadley RN  Outcome: Completed  10/9/2021 0520 by Jannie Piña RN  Outcome: Ongoing  Goal: Absence of physical injury  Description: Absence of physical injury  10/9/2021 0933 by Denise Hadley RN  Outcome: Completed  10/9/2021 0520 by Jannie Piña RN  Outcome: Ongoing     Problem: Skin Integrity:  Goal: Will show no infection signs and symptoms  Description: Will show no infection signs and symptoms  10/9/2021 0933 by Denise Hadley RN  Outcome: Completed  10/9/2021 0520 by Jannie Piña RN  Outcome: Ongoing  Goal: Absence of new skin breakdown  Description: Absence of new skin breakdown  10/9/2021 0933 by Denise Hadley RN  Outcome: Completed  10/9/2021 0520 by Jannie Piña RN  Outcome: Ongoing

## 2021-10-09 NOTE — PROGRESS NOTES
Occupational Therapy   Occupational Therapy Initial Assessment  Date: 10/9/2021   Patient Name: Eunice Schuster  MRN: [id number]     : 1950    Date of Service: 10/9/2021  Chief Complaint   Patient presents with    Chest Pain     x1 wk       Discharge Recommendations: Pt unsafe to return to PLOF d/t poor func mob, ADL's, and IADL's. Patient would benefit from continued therapy after discharge     Assessment   Performance deficits / Impairments: Decreased functional mobility ; Decreased endurance;Decreased balance;Decreased ADL status; Decreased high-level IADLs;Decreased strength  Prognosis: Good  Decision Making: Medium Complexity  OT Education: Plan of Care;OT Role;Transfer Training;Precautions  Patient Education: Good return from pt  REQUIRES OT FOLLOW UP: Yes  Activity Tolerance  Activity Tolerance: Patient Tolerated treatment well;Patient limited by fatigue;Patient limited by pain  Safety Devices  Safety Devices in place: Yes  Type of devices: Call light within reach;Gait belt;Patient at risk for falls;Nurse notified; Left in bed  Restraints  Initially in place: No           Patient Diagnosis(es): The primary encounter diagnosis was Other chest pain. Diagnoses of Nausea, DDD (degenerative disc disease), cervical, Lumbosacral spondylosis without myelopathy, Sacroiliitis, not elsewhere classified ([address] and Lumbar radiculopathy, chronic were also pertinent to this visit.      has a past medical history of Abdominal bloating, Adenomatous polyp of ascending colon, Allergic rhinitis, Anxiety, Arthritis, Asthma, Atrial fibrillation (HCC), Back pain, Benign hypertension with CKD (chronic kidney disease) stage III ([address] CAD (coronary artery disease), Caffeine use, CHF (congestive heart failure) ([address] Chronic kidney disease, CKD (chronic kidney disease) stage 3, GFR 30-59 ml/min ([facility]), COPD (chronic obstructive pulmonary disease) ([address] Degeneration of lumbar or lumbosacral intervertebral disc, Depression, DM (diabetes mellitus) ([facility] Emphysema of lung ([facility] Gastritis, GERD (gastroesophageal reflux disease), Hearing loss, Hematuria, Hiatal hernia, HTN (hypertension), benign, Hypertriglyceridemia, Incontinence of urine, Irritable bowel syndrome with both constipation and diarrhea, Knee arthropathy, Lumbosacral spondylosis without myelopathy, Migraine headache, Mumps, Neuropathy, Obesity, On home oxygen therapy, HIGINIO on CPAP, Otitis media, Secondary diabetes mellitus with stage 3 chronic kidney disease (GFR 30-59) ([facility] Stroke ([facility] Tinnitus, Type 2 diabetes mellitus without complication ([facility] Type II or unspecified type diabetes mellitus without mention of complication, not stated as uncontrolled, UTI (lower urinary tract infection), and Varicose vein. has a past surgical history that includes Cholecystectomy (2007); Carpal tunnel release (Right); Tympanoplasty; Dilation & curettage (1979); Cystocopy (9/25/2013); Cataract removal with implant (Bilateral); Tonsillectomy; Incontinence surgery; ablation of dysrhythmic focus (2000); Upper gastrointestinal endoscopy (03/2016); eye surgery; Tubal ligation; other surgical history (09/10/15); Insertable Cardiac Monitor (12/04/2015); Tympanoplasty; Colonoscopy; Colonoscopy (04/10/2017); pr colsc flx w/removal lesion by hot bx forceps (N/A, 4/10/2017); Tympanostomy tube placement (08/21/2017); Upper gastrointestinal endoscopy (N/A, 3/2/2021); and Colonoscopy (N/A, 3/2/2021).          Restrictions  Restrictions/Precautions  Restrictions/Precautions: Up as Tolerated, Fall Risk, General Precautions  Required Braces or Orthoses?: No  Position Activity Restriction  Other position/activity restrictions: 4L O2 entire session    Subjective   General  Patient assessed for rehabilitation services?: Yes  Family / Caregiver Present: No  Diagnosis: L chest pain, BLE edema  Patient Currently in Pain: Yes  Pain Assessment  Pain Assessment: 0-10  Pain Level: 8  Pain Type: Acute pain  Pain Location: Head  Pain Descriptors: Headache  Pain Frequency: Continuous  Pain Onset: Awakened from sleep  Non-Pharmaceutical Pain Intervention(s): Ambulation/Increased Activity; Distraction; Emotional support; Therapeutic presence  Response to Pain Intervention: Patient Satisfied  Patient Currently in Pain: Yes  Oxygen Therapy  SpO2: 91 %  Pulse Oximeter Device Mode: Continuous  O2 Device: Nasal cannula  O2 Flow Rate (L/min): 4 L/min  Social/Functional History  Social/Functional History  Lives With: Alone  Type of Home: Apartment (8th floor with elevator)  Home Layout: One level  Home Access: Level entry  Bathroom Shower/Tub: Tub/Shower unit  Bathroom Toilet: Standard  Bathroom Equipment: Grab bars in shower, Shower chair  Home Equipment: 4 wheeled walker, Electric scooter (uses rollator at all times, sometimes uses electric scooter for community distances)  Receives Help From: Friend(s) (Friend lives in building and assists pt with supervision during some ADL's, checks on pt 2-3x daily)  ADL Assistance: Needs assistance  Homemaking Assistance: Needs assistance  Homemaking Responsibilities: Yes (Friend assists with cooking/cleaning, pt goes to grocery store and pushes cart)  Ambulation Assistance: Independent  Transfer Assistance: Independent  Active : No  Patient's  Info: public transportation  Occupation: Retired  Leisure & Hobbies: poker night with friends/family  Additional Comments: Pt on 2L Home O2 daily     Objective   Vision: Impaired  Vision Exceptions: Wears glasses at all times  Hearing: Exceptions to [facility]  Hearing Exceptions: Right hearing aid;Hard of hearing/hearing concerns    Orientation  Overall Orientation Status: Within Functional Limits     Balance  Sitting Balance: Supervision  Standing Balance: Contact guard assistance  Standing Balance  Time: 1.5 min  Activity: Pt stood bedside x2, short func mob at bedside  Functional Mobility  Functional - Mobility Device: Rolling Walker  Activity: Elbow Extension 145-0: WFL  LUE Strength  Gross LUE Strength: Ex[facility] to [facility]  L Shoulder Flex: 3-/5  L Elbow Flex: 4/5  L Elbow Ext: 4/5  L Hand General: 4+/5  RUE Strength  Gross RUE Strength: Exceptions to [facility]  R Shoulder Flex: 3-/5  R Elbow Flex: 4/5  R Elbow Ext: 4/5  R Hand General: 4+/5         Plan   Plan  Times per week: 3-4x           AM-PAC Score        AM-[facility] Inpatient Daily Activity Raw Score: 15 (10/09/21 1041)  AM-PAC Inpatient ADL T-Scale Score : 34.69 (10/09/21 1041)  ADL Inpatient CMS 0-100% Score: 56.46 (10/09/21 1041)  ADL Inpatient CMS G-Code Modifier : CK (10/09/21 1041)    Goals  Short term goals  Time Frame for Short term goals: Pt will by discharge  Short term goal 1: demo good safety awareness during func mob around room using LRD and CGA  Short term goal 2: demo ADL UB bathing/dressing activity with mod I and increased time/setup  Short term goal 3: demo ADL LB bathing/dressing activity with Min A, AD PRN, and increased time/setup     Therapy Time   Individual Concurrent Group Co-treatment   Time In 0843         Time Out 0919         Minutes 36         Timed Code Treatment Minutes: 30 Minutes       Sonny Frankel, OTR/L

## 2021-10-09 NOTE — PROGRESS NOTES
[facility]  Office: [address], DO, Derrell Mode, DO, Essenceanish Montoyao, DO, Maggie Rossi Blood, DO, Carlton Hoffman MD, Salvador Fuentes MD, Caty Clemons MD, Russel Rader MD, Betsy Cassidy MD, Lana Barcenas MD, Glo Moritz, MD, Paolo Link, DO, Uri Alvarado, DO, Kenneth Dean MD,  Teodora Boles, DO, Rojas Mcfadden MD, Tom Britt MD, Baldomero Parmar MD, Nelson Jenkins MD, Latrice Lopez MD, Blanca Espinoza MD, Eleus Talamantes [facility], [facility], CNP, Maryan Pearson, CNP, Isidro Evans, CNS, Adams Rayo, CNP, Samina Dietz, CNP, Joel Rubalcava, CNP, Dulce Flores, CNP, Edna Henry, CNP, Delbert Robb PA-C, Romel Hart, [facility], Doug Gomes, CNP, Jad Orozco, CNP, Michelle Reynoso, CNP, Colonel Perez, CNP, Orquidea Lindsay, CNP, Gideon Villavicencio, CNP, Sima [address]    Progress Note    10/9/2021    9:43 AM    Name:   Dee Viveros  MRN:     [id number]     [place]:      [de-identified]   Room:   2016/2016-01   Day:  3  Admit Date:  10/5/2021  6:18 PM    PCP:   Brooklyn Pozo MD  Code Status:  Full Code    Subjective:     C/C:   Chief Complaint   Patient presents with    Chest Pain     x1 wk     Interval History Status:   Eating breakfast  Feeling better  No chest pain  Less dyspnea on exertion    Data Base Updates:  Has been in negative fluid balance    [id number] mg/dL     LYB76ms/dL   CREATININE0. 98High mg/dL   Bun/Cre RatioNOT REPORTED     Calcium8.5Low         Brief History:     The patient has been evaluated at the request of the observation service for admission      History of Present Illness:  Dee Viveros is a 69 y.o.  female who presents with Chest Pain (x1 wk)     Patient was admitted thru ER to the Observation unit with:  \"Mariangel Perdue a  75 y. o. female with extensive past medical history including diabetes, CHF, COPD, CKD, and TIA on home oxygen who presents to the ED for 3 days of intermittent \"electrical\" shooting pain in her left chest that radiates to her back under a scapula.     It lasts for approx 20 seconds and resolves to 0.   Occurred a few times in the past 2 days, twice today, the 2nd time greater in intensity and causing pain into her left arm, which is what brought her to the department to be evaluated.  Describes her left arm as being painful and weak during the episode, resolved but now tender in the deltoid region.   No increase in cough in the last several weeks, is on her baseline oxygen therapy of 2 L intermittent during the day and meeting her goal of 87 to 92%.  She intermittently will get swelling in her bilateral lower extremities but not in the last few days.    Completed a nuc med stress test in April of this year for chest pain, with LVEF >70% and stratified as low risk. Padilla Hodges was seen in the ED in April for similar presentation, workup at that was unremarkable. \"     The patient was not wearing her BiPAP this morning  O2 sats dropped to 65%  BP as low as 67/47 noted  The patient was found to be somnolent  Hospitalist service was consulted for admission and management     Subsequent database has included:  Chest x-ray:  Impression:  New left infrahilar opacity, which may be related to atelectasis or small   infiltrate. The pulmonary vasculature appears unremarkable.      Bxxwvph201Hbaj mg/dL      ZFT43zs/dL   CREATININE1. 35High      Results for Ena Gutierrez (MRN [id number]) as of 10/6/2021 11:29    Ref. Range 4/30/2021 12:41 5/26/2021 15:59 10/5/2021 18:52 10/6/2021 08:47   Creatinine Latest Ref Range: 0.50 - 0.90 mg/dL 1.01 (H) 1.06 (H) 1.01 (H) 1.35 (H)      EKG:  Sinus rhythm with marked sinus arrhythmia   Left axis deviation   Abnormal ECG   When compared with ECG of 05-OCT-2021 18:22,   QRS duration has increased   Criteria for Septal infarct are no longer Present   QT has lengthened      KGS24Grmx U/L   [id number]      Stress test 4/2021:  1.  No definitive scintigraphic evidence for reversible ischemia or infarct. 2. Left ventricular ejection fraction of greater than 70%.     Venous blood gases revealed:  pH, Onel 7.174Low Panic   pCO2, Ven83. 8High   pO2, Ven33.7     The initial plan included:  Pulmonary/critical care consult for respiratory failure  Cardiology evaluation in progress for atypical chest pain  Optimize cardio-pulmonary function  Respiratory Therapy and Bronchodilators prn   Encourage BiPAP use  Oxygen supplementation as needed  Avoid respiratory depressants  Glycemic contol - monitor and control blood sugars  Blood Pressure - Monitor and control  Observe for symptomatic hypotension  -Coreg on hold  -Losartan on hold  Reflux precautions  Risk factor management / weight loss   YAJAIRA: Check bun and creatinine estimation trend LFTs  DVT prophylaxis     Echocardiogram:  Summary   Normal LV size and wall thickness. No obvious wall motion abnormality seen. Normal LV systolic function with LVEF >55%. Normal RV size and function. LA and RA appears normal in size. No obvious significant structural valvular abnormality noted. No significant valvular stenosis or regurgitation noted. Normal aortic root dimension. No significant pericardial effusion noted. IVC normal diameter and inspiratory variation indicating normal RA filling   pressure.      The patient responded to therapy  Their status was stabilized   DC planning was initiated  The patient was instructed to follow up with their PCP, Mey Smith MD in one week        Past Medical History:   has a past medical history of Abdominal bloating, Adenomatous polyp of ascending colon, Allergic rhinitis, Anxiety, Arthritis, Asthma, Atrial fibrillation ([address] Back pain, Benign hypertension with CKD (chronic kidney disease) stage III ([address] CAD (coronary artery disease), Caffeine use, CHF (congestive heart failure) ([address] Chronic kidney disease, CKD (chronic kidney disease) stage 3, GFR 30-59 ml/min ([facility] COPD (chronic obstructive pulmonary disease) ([facility] Degeneration of lumbar or lumbosacral intervertebral disc, Depression, DM (diabetes mellitus) ([facility] Emphysema of lung ([facility] Gastritis, GERD (gastroesophageal reflux disease), Hearing loss, Hematuria, Hiatal hernia, HTN (hypertension), benign, Hypertriglyceridemia, Incontinence of urine, Irritable bowel syndrome with both constipation and diarrhea, Knee arthropathy, Lumbosacral spondylosis without myelopathy, Migraine headache, Mumps, Neuropathy, Obesity, On home oxygen therapy, HIGINIO on CPAP, Otitis media, Secondary diabetes mellitus with stage 3 chronic kidney disease (GFR 30-59) ([facility] Stroke [facility] Tinnitus, Type 2 diabetes mellitus without complication ([facility] Type II or unspecified type diabetes mellitus without mention of complication, not stated as uncontrolled, UTI (lower urinary tract infection), and Varicose vein. Social History:   reports that she quit smoking about 21 years ago. Her smoking use included cigarettes. She has a 123.00 pack-year smoking history. She has never used smokeless tobacco. She reports that she does not drink alcohol and does not use drugs. Family History:   Family History   Problem Relation Age of Onset    Diabetes Mother     Heart Disease Mother     Stomach Cancer Father     Diabetes Maternal Grandmother         also aunts and uncles (maternal)    Emphysema Sister        Review of Systems:     Review of Systems   Constitutional: Positive for activity change (Still diminished) and fatigue (Poor exercise capacity). Respiratory: Positive for shortness of breath ([place]). Negative for cough. Cardiovascular: Positive for chest pain (No more \"shooting pain \", had dull anterior nonexertional pain last night) and leg swelling. Negative for palpitations. Gastrointestinal: Negative for abdominal pain, nausea and vomiting. Genitourinary: Negative for flank pain and hematuria.    Musculoskeletal: Positive for back pain and neck pain. The patient has chronic arthralgias and myalgias  No new musculoskeletal complaints        Physical Examination:        Vitals  /65   Pulse 83   Temp 98 °F (36.7 °C) (Oral)   Resp 18   Ht 5' 2\" (1.575 m)   Wt 250 lb (113.4 kg)   LMP  (LMP Unknown)   SpO2 91%   BMI 45.73 kg/m²   Temp (24hrs), Av.4 °F (36.9 °C), Min:97.6 °F (36.4 °C), Max:99.3 °F (37.4 °C)    Recent Labs     10/08/21  0724 10/08/21  1158 10/08/21  1641 10/08/21  2013   POCGLU 135* 372* 255* 287*       Physical Exam  Vitals reviewed. Constitutional:       General: She is not in acute distress. Appearance: She is not diaphoretic. HENT:      Head: Normocephalic. Nose: Nose normal.   Eyes:      General: No scleral icterus. Conjunctiva/sclera: Conjunctivae normal.   Neck:      Trachea: No tracheal deviation. Cardiovascular:      Rate and Rhythm: Normal rate and regular rhythm. Pulmonary:      Effort: Pulmonary effort is normal. No respiratory distress. Breath sounds: Rhonchi and rales present. No wheezing. Chest:      Chest wall: No tenderness. Abdominal:      General: Bowel sounds are normal. There is no distension. Palpations: Abdomen is soft. Tenderness: There is no abdominal tenderness. Musculoskeletal:         General: No tenderness. Cervical back: Neck supple. Right lower leg: Edema present. Left lower leg: Edema present. Comments: 1-2/4 edema at the ankles   Negative Homans  Negative Yordan   Skin:     General: Skin is warm and dry. Medications: Allergies: Allergies   Allergen Reactions    Robitussin [Guaifenesin] Anaphylaxis    Codeine Swelling    Compazine [Prochlorperazine Maleate] Hives and Swelling    Iodides Itching    Morphine Other (See Comments)     hallucinations    Moxifloxacin      Other reaction(s): Intolerance-unknown  Other reaction(s):  Intolerance-unknown    Reglan [Metoclopramide] Nausea Only    Avelox [Moxifloxacin Hcl In Nacl] Rash     Dermatitis      Cipro Xr Rash     dermatitis    Sulfa Antibiotics Rash       Current Meds:   Scheduled Meds:    clopidogrel  75 mg Oral Daily    vitamin B-12  50 mcg Oral Daily    isosorbide dinitrate  30 mg Oral Daily    furosemide  20 mg Oral Daily    sodium chloride flush  5-40 mL IntraVENous 2 times per day    enoxaparin  40 mg SubCUTAneous Daily    atorvastatin  40 mg Oral Daily    [Held by provider] carvedilol  3.125 mg Oral BID    citalopram  20 mg Oral BID    gabapentin  300 mg Oral BID    insulin glargine  50 Units SubCUTAneous BID    ipratropium-albuterol  3 mL Inhalation Q4H    [Held by provider] losartan  25 mg Oral Daily    magnesium oxide  400 mg Oral BID    melatonin  10 mg Oral Nightly    pantoprazole  40 mg Oral BID    budesonide-formoterol  2 puff Inhalation Daily    topiramate  100 mg Oral Nightly    insulin lispro  0-12 Units SubCUTAneous TID WC    insulin lispro  0-6 Units SubCUTAneous Nightly     Continuous Infusions:    sodium chloride Stopped (10/07/21 0836)    dextrose       PRN Meds: sodium chloride flush, sodium chloride, ondansetron **OR** ondansetron, acetaminophen, albuterol, dicyclomine, oxyCODONE-acetaminophen, glucose, dextrose, glucagon (rDNA), dextrose    Data:     I/O (24Hr):     Intake/Output Summary (Last 24 hours) at 10/9/2021 0943  Last data filed at 10/9/2021 0940  Gross per 24 hour   Intake 1090 ml   Output 1610 ml   Net -520 ml       Labs:  Hematology:  Recent Labs     10/07/21  0623   WBC 9.0   RBC 4.50   HGB 13.8   HCT 49.0*   .9*   MCH 30.7   MCHC 28.2*   RDW 12.5      MPV 12.2     Chemistry:  Recent Labs     10/08/21  0657 10/09/21  0517    137   K 5.2 3.9    99   CO2 29 27   GLUCOSE 140* 115*   BUN 19 14   CREATININE 1.12* 0.98*   ANIONGAP 8* 11   LABGLOM 48* 56*   GFRAA 58* >60   CALCIUM 8.4* 8.5*     Recent Labs     10/07/21  1638 10/07/21  2041 10/08/21  0724 10/08/21  1158 10/08/21  1641 10/08/21  2013   POCGLU 157* 192* 135* 372* 255* 287*     ABG:  Lab Results   Component Value Date    POCPH 7.239 10/06/2021    PH 7.346 11/21/2011    POCPCO2 77.6 10/06/2021    PCO2 49.8 11/21/2011    POCPO2 137.2 10/06/2021    PO2ART 61.3 11/21/2011    POCHCO3 33.1 10/06/2021    HCL9JWY 26.5 11/21/2011    NBEA NOT REPORTED 10/06/2021    PBEA 3 10/06/2021    HCV2RYS NOT REPORTED 10/06/2021    WBQG5HPI 98 10/06/2021    B5OXWFDU 89.7 11/21/2011    FIO2 INFORMATION NOT PROVIDED 10/06/2021     Lab Results   Component Value Date/Time    SPECIAL NOT REPORTED 03/14/2019 04:35 PM     Lab Results   Component Value Date/Time    CULTURE NO SIGNIFICANT GROWTH 03/14/2019 04:35 PM       Radiology:  XR CHEST (2 VW)    Result Date: 10/5/2021  No evidence of acute cardiopulmonary disease     XR CHEST PORTABLE    Result Date: 10/7/2021  Mild bibasilar airspace disease, though overall improved aeration in the left perihilar region and lung base. XR CHEST PORTABLE    Result Date: 10/6/2021  New left infrahilar opacity, which may be related to atelectasis or small infiltrate. The pulmonary vasculature appears unremarkable.        Assessment:        Primary Problem  Acute respiratory failure with hypoxia and hypercarbia [facility])    Active Hospital Problems    Diagnosis Date Noted    Type 2 diabetes mellitus with diabetic polyneuropathy, with long-term current use of insulin (HCC) [E11.42, Z79.4]      Priority: High    COPD (chronic obstructive pulmonary disease) [J44.9]      Priority: High    Macrocytosis [D75.89] 10/07/2021    Hypotension [I95.9] 10/06/2021    Hypoxia [R09.02] 10/06/2021    Left ventricular diastolic dysfunction [C74.8] 10/06/2021    Transaminasemia [R74.01] 10/06/2021    Acute respiratory failure with hypoxia and hypercarbia ([address] [J96.01, J96.02] 10/06/2021    Atypical chest pain [R07.89] 10/05/2021    Chest pain [R07.9] 04/05/2021    [facility] [E66.01] 01/26/2021    Obesity, Class III, BMI 40-49.9 (morbid obesity) ([facility] [E66.01] 07/16/2018    Asthma with COPD ([facility] [J44.9] 07/16/2018    YAJAIRA (acute kidney injury) ([facility] [N17.9] 02/01/2018    Obstructive sleep apnea syndrome [G47.33] 02/01/2018    Morbid (severe) obesity with alveolar hypoventilation (HCC) [E66.2] 12/07/2016    GERD (gastroesophageal reflux disease) [K21.9] 06/28/2014    HTN (hypertension), benign [I10] 06/28/2014    COPD exacerbation ([facility] [J44.1] 06/28/2014         Plan:        Pulmonary f/u Dr Brianda Moreno  Cardiology f/u [facility]  Optimize cardio-pulmonary function  Respiratory Therapy and Bronchodilators prn   BiPAP   Oxygen supplementation as needed  Avoid respiratory depressants  Glycemic contol - monitor and control blood sugars  Observe for symptomatic hypoglycemia  Blood Pressure - Monitor and control  Observe for symptomatic hypotension  Reflux precautions  Risk factor management / weight loss   YAJAIRA: Check bun and creatinine  Trend LFT  Check B12/folate  PT/OT  DVT prophylaxis   Discharge planning  Check D-dimer  Med rec done  MAY done  Will discharge when arrangements complete and ok with other services.   Has been accepted at [facility]  Follow-up with PCP in one week, Karen Lindsay MD  Notify PCP of discharge     IP CONSULT TO RESPIRATORY CARE  IP CONSULT TO INTERNAL MEDICINE  IP CONSULT TO HOSPITALIST  IP CONSULT TO IV TEAM  IP CONSULT TO CRITICAL CARE  IP CONSULT TO CARDIOLOGY  IP CONSULT TO [facility],   10/9/2021  9:43 AM

## 2021-10-09 NOTE — DISCHARGE SUMMARY
[facility]  Office: [address], DO, Kendrick Marie, DO, Mat Dale, DO, Anaclint Velazquez Blood, DO, Cara Rivas MD, Filipe Mchugh MD, Samira Lugo MD, Carmen Bejarano MD, Adry Yeung MD, Ortega Olvera MD, Teresita Lagunas MD, Arely Marcano, DO, Frank Simmonds, DO, Jg Aviles MD,  Essence Franco, DO, Tomasa Miranda MD, Norm Hoskins MD, Josie Concepcion MD, Micha Verdugo MD, Marciano Bernheim, MD, Chele Greene MD, Neda Dan, [facility], [facility] Armando, [facility], Josie Rojas, CNP, Fran New, CNS, Shalom Sykes, CNP, Hanna Piper, CNP, Brielle Lott, CNP, Mavis Verdin, CNP, Venu Pierre, CNP, Barby Schuster PA-C, Jo Lama, [facility], Noel Ibanez, CNP, Lazaro Lozano, CNP, Woodward Brittle, CNP, Diandra Nicholson, CNP, Parisa Duran, CNP, Linda Renteria, [facility], Momo Turner, [address] [place] [address]    Discharge Summary    Patient ID: Doyle Shook  :  1950   MRN: [id number]     ACCOUNT:  [de-identified]   Patient's PCP: Georgia Ferguson MD  Admit Date: 10/5/2021   Discharge Date: 10/9/2021    Discharge Physician: Talya Nicholson DO     The patient was seen and examined on day of discharge and this discharge summary is in conjunction with any daily progress note from day of discharge.     Active Discharge Diagnoses:     Primary Problem  Acute respiratory failure with hypoxia and hypercarbia [facility])      Hospital Problems  Active Hospital Problems    Diagnosis Date Noted    Type 2 diabetes mellitus with diabetic polyneuropathy, with long-term current use of insulin (HCC) [E11.42, Z79.4]      Priority: High    COPD (chronic obstructive pulmonary disease) [J44.9]      Priority: High    Macrocytosis [D75.89] 10/07/2021    Hypotension [I95.9] 10/06/2021    Hypoxia [R09.02] 10/06/2021    Left ventricular diastolic dysfunction [V29.4] 10/06/2021    Transaminasemia [R74.01] 10/06/2021    Acute respiratory failure with hypoxia and hypercarbia (HCC) [J96.01, J96.02] 10/06/2021    Atypical chest pain [R07.89] 10/05/2021    Chest pain [R07.9] 04/05/2021    Morbid obesity ([facility] [address] [E66.01] 01/26/2021    Obesity, Class III, BMI 40-49.9 (morbid obesity) ([facility] [address] [E66.01] 07/16/2018    Asthma with COPD ([facility] [address] [J44.9] 07/16/2018    YAJAIRA (acute kidney injury) ([facility] [address] [N17.9] 02/01/2018    Obstructive sleep apnea syndrome [G47.33] 02/01/2018    Morbid (severe) obesity with alveolar hypoventilation (HCC) [E66.2] 12/07/2016    GERD (gastroesophageal reflux disease) [K21.9] 06/28/2014    HTN (hypertension), benign [I10] 06/28/2014    COPD exacerbation ([facility] [address] [J44.1] 06/28/2014         Hospital Course:     Brief History  Patient was admitted thru [facility] the Observation unit with:  \"Mariangel Abreu is a  75 y. o. female with extensive past medical history including diabetes, CHF, COPD, CKD, and TIA on home oxygen who presents to the ED for 3 days of intermittent \"electrical\" shooting pain in her left chest that radiates to her back under a scapula.     It lasts for approx 20 seconds and resolves to 0.   Occurred a few times in the past 2 days, twice today, the 2nd time greater in intensity and causing pain into her left arm, which is what brought her to the department to be evaluated.  Describes her left arm as being painful and weak during the episode, resolved but now tender in the deltoid region.   No increase in cough in the last several weeks, is on her baseline oxygen therapy of 2 L intermittent during the day and meeting her goal of 87 to 92%.  She intermittently will get swelling in her bilateral lower extremities but not in the last few days.    Completed a nuc med stress test in April of this year for chest pain, with LVEF >70% and stratified as low risk.  She was seen in the ED in April for similar presentation, workup at that was unremarkable. \"     The patient was not wearing her BiPAP this morning  O2 sats dropped to 65%  BP as low as 67/47 noted  The patient was found to be somnolent  Hospitalist service was consulted for admission and management     Subsequent database has included:  Chest x-ray:  Impression:  New left infrahilar opacity, which may be related to atelectasis or small   infiltrate. The pulmonary vasculature appears unremarkable.      [id number] mg/dL      ESR63uz/dL   CREATININE1. 35High      Results for Moe Clement (MRN [id number]) as of 10/6/2021 11:29    Ref. Range 4/30/2021 12:41 5/26/2021 15:59 10/5/2021 18:52 10/6/2021 08:47   Creatinine Latest Ref Range: 0.50 - 0.90 mg/dL 1.01 (H) 1.06 (H) 1.01 (H) 1.35 (H)      EKG:  Sinus rhythm with marked sinus arrhythmia   Left axis deviation   Abnormal ECG   When compared with ECG of 05-OCT-2021 18:22,   QRS duration has increased   Criteria for Septal infarct are no longer Present   QT has lengthened      NFJ19Zuwt U/L   FTP75Nnnp      Stress test 4/2021:  1. No definitive scintigraphic evidence for reversible ischemia or infarct. 2. Left ventricular ejection fraction of greater than 70%.     Venous blood gases revealed:  pH, Onel 7.174Low Panic   pCO2, Ven83. 8High   pO2, Ven33.7      The initial plan included:  Pulmonary/critical care consult for respiratory failure  Cardiology evaluation in progress for atypical chest pain  Optimize cardio-pulmonary function  Respiratory Therapy and Bronchodilators prn   Encourage BiPAP use  Oxygen supplementation as needed  Avoid respiratory depressants  Glycemic contol - monitor and control blood sugars  Blood Pressure - Monitor and control  Observe for symptomatic hypotension  -Coreg on hold  -Losartan on hold  Reflux precautions  Risk factor management / weight loss   YAJAIRA: Check bun and creatinine estimation trend LFTs  DVT prophylaxis    Echocardiogram:  Summary   Normal LV size and wall thickness. No obvious wall motion abnormality seen. Normal LV systolic function with LVEF >55%. Normal RV size and function. LA and RA appears normal in size. No obvious significant structural valvular abnormality noted. No significant valvular stenosis or regurgitation noted. Normal aortic root dimension. No significant pericardial effusion noted. IVC normal diameter and inspiratory variation indicating normal RA filling   pressure.      The patient responded to therapy  Their status was stabilized   DC planning was initiated  The patient was instructed to follow up with their PCP, Osvaldo Alvarez MD in one week           Discharge plan:     Pulmonary f/u Dr Shruti Uribe  Cardiology f/u [facility]  Optimize cardio-pulmonary function  Respiratory Therapy and Bronchodilators prn   BiPAP   Oxygen supplementation as needed  Avoid respiratory depressants  Glycemic contol - monitor and control blood sugars  Observe for symptomatic hypoglycemia  Blood Pressure - Monitor and control  Observe for symptomatic hypotension  Reflux precautions  Risk factor management / weight loss   YAJAIRA: Check bun and creatinine  Trend LFT  Check B12/folate  PT/OT  DVT prophylaxis   Discharge planning  Check D-dimer  Med rec done  MAY done  Will discharge when arrangements complete and ok with other services. Has been [facility] at [facility]  Follow-up with PCP in one week, Osvaldo Alvarez MD  Notify PCP of discharge   DCP 37 min+    No discharge procedures on file.     Significant Diagnostic Studies:     Labs / Micro:  Labs:  Hematology:  Recent Labs     10/07/21  0623 10/09/21  1024   WBC 9.0  --    RBC 4.50  --    HGB 13.8  --    HCT 49.0*  --    .9*  --    MCH 30.7  --    MCHC 28.2*  --    RDW 12.5  --      --    MPV 12.2  --    DDIMER  --  0.50     Chemistry:  Recent Labs     10/08/21  0657 10/09/21  0517    137   K 5.2 3.9    99   CO2 29 27   GLUCOSE 140* 115*   BUN 19 14   CREATININE 1.12* 0.98*   ANIONGAP 8* 11   LABGLOM 48* 56*   GFRAA 58* >60   CALCIUM 8.4* 8.5*     Recent Labs     10/07/21  2041 10/08/21  0724 10/08/21  1158 10/08/21  1641 10/08/21  2013 10/09/21  1124   POCGLU 192* 135* 372* 255* 287* 307*         Radiology:    ECHO Complete 2D W Doppler W Color    Result Date: 10/7/2021  Transthoracic Echocardiography Report (TTE)  Patient Name Clifton Palacios     Date of Study             10/07/2021               NO CHU   Date of      1950  Gender                    Female  Birth   Age          69 year(s)  Race                         Room Number  2016        Height:                   62 inch, 157.48 cm   Corporate ID [id number]    Weight:                   250 pounds, 113.4 kg  #   Patient Acct [de-identified]   BSA:         2.1 m^2      BMI:       45.73 kg/m^2  #   MR #         [id number]     Twila Velarde   Accession #  [id number]  Interpreting Physician    Stanley Javed 60   Fellow                   Referring Nurse                           Practitioner   Interpreting             Referring Physician       Libby Renner MD  Fellow  Type of Study   TTE procedure:2D Echocardiogram, M-Mode, Doppler, Color Doppler. Procedure Date Date: 10/07/2021 Start: 10:17 AM Study Location: [facility] Technical Quality: Adequate visualization Indications:Chest pain. History / Tech. Comments: Echo done at patient bedside. Procedure explained to patient. Type 2 DM, HTN Patient Status: Inpatient Height: 62 inches Weight: 250.01 pounds BSA: 2.1 m^2 BMI: 45.73 kg/m^2 Allergies   - Iodine.   - Sulfa. - Codeine.   - Other:(Drug Allergies - CIPRO, AVELOX). CONCLUSIONS Summary Normal LV size and wall thickness. No obvious wall motion abnormality seen. Normal LV systolic function with LVEF >55%. Normal RV size and function. LA and RA appears normal in size. No obvious significant structural valvular abnormality noted.  No significant valvular stenosis or regurgitation noted. Normal aortic root dimension. No significant pericardial effusion noted. IVC normal diameter and inspiratory variation indicating normal RA filling pressure. Signature ----------------------------------------------------------------------------  Electronically signed by Kendal Walsh(Interpreting physician) on  10/07/2021 02:09 PM ---------------------------------------------------------------------------- ----------------------------------------------------------------------------  Electronically signed by Zoe Gomes(Sonographer) on 10/07/2021  11:31 AM ---------------------------------------------------------------------------- FINDINGS Left Atrium Left atrium is normal in size. Left Ventricle Left ventricle is normal in size. Global left ventricular systolic function is normal. Calculated ejection fraction 69% by Alcantar's method. Right Atrium Right atrium is normal in size. Right Ventricle Normal right ventricular size and function. TAPSE value of 1.80cm noted. Mitral Valve Normal mitral valve structure and function. No mitral regurgitation. Aortic Valve Normal aortic valve structure and function without stenosis or regurgitation. Tricuspid Valve Normal tricuspid valve structure and function. No tricuspid regurgitation. Pulmonic Valve The pulmonic valve is normal in structure. No pulmonic insufficiency. Pericardial Effusion Anterior echo free space suggestive of adipose tissue is seen. Miscellaneous E/E' average = 11.9. IVC normal diameter & inspiratory collapse indicating normal RA filling pressure .  M-mode / 2D Measurements & Calculations:   LVIDd:4.4 cm(3.7 - 5.6 cm)       Diastolic XFNFE ml  YYMHQ:3.9 cm(2.2 - 4.0 cm)       Systolic KYUEJB:7.26 ml  FFDL:7.6 cm(0.6 - 1.1 cm)        Aortic Root:2.7 cm(2.0 - 3.7 cm)  LVPWd:1.1 cm(0.6 - 1.1 cm)       LA Dimension: 3.7 cm(1.9 - 4.0 cm)  Fractional Shortenin.09 %    LA volume/Index: 42.2 ml /20m^2 Calculated LVEF (%): 76.46 %     LVOT:1.9 cm                                   RVDd:3.2 cm   Mitral:                                 Aortic   Valve Area (P1/2-Time): 3.14 cm^2       Peak Velocity: 1.59 m/s  Peak E-Wave: 0.90 m/s                   Mean Velocity: 1.06 m/s  Peak A-Wave: 0.84 m/s                   Peak Gradient: 10.11 mmHg  E/A Ratio: 1.08                         Mean Gradient: 5 mmHg  Peak Gradient: 3.27 mmHg  Mean Gradient: 2 mmHg  Deceleration Time: 238 msec             Area (continuity): 2.18 cm^2  P1/2t: 70 msec                          AV VTI: 34.2 cm   Area (continuity): 2.5 cm^2  Mean Velocity: 0.66 m/s                                           Pulmonic:                                           Peak Velocity: 1.07 m/s                                          Peak Gradient: 4.58 mmHg  Diastology / Tissue Doppler Septal Wall E' velocity:0.08 m/s Septal Wall E/E':12 Lateral Wall E' velocity:0.08 m/s Lateral Wall E/E':11.9    XR CHEST (2 VW)    Result Date: 10/5/2021  EXAMINATION: TWO XRAY VIEWS OF THE CHEST 10/5/2021 1:39 pm COMPARISON: April 30, 2021 HISTORY: ORDERING SYSTEM PROVIDED HISTORY: left sided chest pain x3 days, intermittent TECHNOLOGIST PROVIDED HISTORY: left sided chest pain x3 days, intermittent FINDINGS: Adequate inspiration is present. No focal area of consolidation or pneumothorax is noted. Heart size and mediastinal contours are stable. A ventricular loop recorder is in place. Osseous structures are stable. No evidence of acute cardiopulmonary disease     XR CHEST PORTABLE    Result Date: 10/7/2021  EXAMINATION: ONE XRAY VIEW OF THE CHEST 10/7/2021 6:07 am COMPARISON: 10/06/2021 HISTORY: ORDERING SYSTEM PROVIDED HISTORY: Respiratory failure TECHNOLOGIST PROVIDED HISTORY: Respiratory failure Reason for Exam: Upright port, resp failure FINDINGS: Cardiac device overlies the patient's left chest.  The cardiac mediastinal silhouettes appear stable.   Mild improved aeration noted within the left mid lung, with mild bibasilar airspace disease, though improved at the left lung base. No acute osseous abnormality. No pneumothorax is identified. Mild bibasilar airspace disease, though overall improved aeration in the left perihilar region and lung base. XR CHEST PORTABLE    Result Date: 10/6/2021  EXAMINATION: ONE XRAY VIEW OF THE CHEST 10/6/2021 9:00 am COMPARISON: 10/05/2021 HISTORY: ORDERING SYSTEM PROVIDED HISTORY: hypoxic, unresposive - improved with bipap- pulm congestion? TECHNOLOGIST PROVIDED HISTORY: hypoxic, unresposive - improved with bipap- pulm congestion? FINDINGS: Mild left infrahilar opacity. Pulmonary vasculature appears unremarkable. No definite pleural effusions. No pneumothorax. Heart size stable. New left infrahilar opacity, which may be related to atelectasis or small infiltrate. The pulmonary vasculature appears unremarkable. Consultations:    Consults:     Final Specialist Recommendations/Findings:   IP CONSULT TO RESPIRATORY CARE  IP CONSULT TO INTERNAL MEDICINE  IP CONSULT TO HOSPITALIST  IP CONSULT TO IV TEAM  IP CONSULT TO CRITICAL CARE  IP CONSULT TO CARDIOLOGY  IP CONSULT TO PULMONOLOGY        Discharged Condition:    Stable     Disposition: Christel Stewart    Physician Follow Up:   [place] [address] [phone]           Activity:  activity as tolerated    Diet:  cardiac diet and diabetic diet     Discharge Medications:      Medication List      CHANGE how you take these medications    citalopram 40 MG tablet  Commonly known as: CELEXA  TAKE 1 2 TABLET BY MOUTH TWICE A DAY  What changed:   · how much to take  · how to take this  · when to take this  · additional instructions     gabapentin 300 MG capsule  Commonly known as: Neurontin  Take 1 capsule by mouth 3 times daily for 30 days.   What changed: when to take this     Lantus SoloStar 100 UNIT/ML injection pen  Generic drug: insulin glargine  Inject 42 Units into the skin nightly  What changed:   · how much to take  · when to take this     oxyCODONE-acetaminophen 5-325 MG per tablet  Commonly known as: Percocet  Take 1 tablet by mouth every 6 hours as needed for Pain for up to 7 days. Intended supply: 30 days.  1 tab 3-4 times a day prn  What changed:   · when to take this  · reasons to take this        CONTINUE taking these medications    Adjust Bath/Shower Seat/Back Misc  Use daily for shower     albuterol (2.5 MG/3ML) 0.083% nebulizer solution  Commonly known as: PROVENTIL  Take 3 mLs by nebulization every 6 hours as needed for Wheezing     Aspirin Low Dose 81 MG EC tablet  Generic drug: aspirin  TAKE 1 TAB BY MOUTH ONCE A DAY     atorvastatin 40 MG tablet  Commonly known as: Lipitor  Take 1 tablet by mouth daily     B-D SINGLE USE SWABS REGULAR Pads  THREE TIMES A DAY     BiPAP Machine Misc     * blood glucose monitor kit and supplies  Use daily to check BS     * ONE TOUCH ULTRA 2 w/Device Kit     carvedilol 3.125 MG tablet  Commonly known as: COREG  TAKE 1 TAB BY MOUTH TWICE A DAY ( IN THE MORNING AND BEDTIME )     clopidogrel 75 MG tablet  Commonly known as: PLAVIX  TAKE 1 TAB BY MOUTH ONCE A DAY ( IN THE MORNING ) -THIS MEDICINE MAY BE TAKENWITH OR WITHOUT FOOD     Compression Stockings Misc  by Does not apply route Pressure between 20 - 25 .     dicyclomine 10 MG capsule  Commonly known as: BENTYL  Take 1 capsule by mouth 2 times daily as needed (abdominal spasm)     docusate sodium 100 MG capsule  Commonly known as: Colace  Take 1 capsule by mouth 2 times daily Please use while taking Percocet     ferrous sulfate 325 (65 Fe) MG tablet  Commonly known as: IRON 325  TAKE 1 TAB BY MOUTH EVERY MORNING     furosemide 20 MG tablet  Commonly known as: LASIX  TAKE 1 TAB BY MOUTH ONCE A DAY AS NEEDED ( WEIGHT GAIN 3 LBS OVERNIGHT )     ipratropium-albuterol 0.5-2.5 (3) MG/3ML Soln nebulizer solution  Commonly known as: DUONEB  Inhale 3 mLs into DIRECTED     Vascepa 1 g Caps capsule  Generic drug: Icosapent Ethyl  Take 1 capsule by mouth 2 times daily     vitamin B-12 100 MCG tablet  Commonly known as: CYANOCOBALAMIN         * This list has 6 medication(s) that are the same as other medications prescribed for you. Read the directions carefully, and ask your doctor or other care provider to review them with you. STOP taking these medications    ibuprofen 800 MG tablet  Commonly known as: ADVIL;MOTRIN           Where to Get Your Medications      You can get these medications from any pharmacy    Bring a paper prescription for each of these medications  · oxyCODONE-acetaminophen 5-325 MG per tablet         Time Spent on discharge is  37 mins in patient examination, evaluation, counseling, medication reconciliation, discharge plan and follow up. Electronically signed by   Ania Stallworth DO  10/9/2021  5:18 PM      Thank you Dr. Mahesh Yeboah MD for the opportunity to be involved in this patient's care.

## 2021-10-09 NOTE — PLAN OF CARE
BRONCHOSPASM/BRONCHOCONSTRICTION     [x]         IMPROVE AERATION/BREATH SOUNDS  [x]   ADMINISTER BRONCHODILATOR THERAPY AS APPROPRIATE  [x]   ASSESS BREATH SOUNDS  []   IMPLEMENT AEROSOL/MDI PROTOCOL  [x]   PATIENT EDUCATION AS NEEDED         NON INVASIVE VENTILATION  PROVIDE OPTIMAL VENTILATION/ACCEPTABLE SP02  IMPLEMENT NON INVASIVE VENTILATION PROTOCOL  ASSESSMENT SKIN INTEGRITY  PATIENT EDUCATION AS NEEDED  BIPAP AS NEEDED

## 2021-10-11 ENCOUNTER — TELEPHONE (OUTPATIENT)
Dept: FAMILY MEDICINE CLINIC | Age: 71
End: 2021-10-11

## 2021-10-11 NOTE — TELEPHONE ENCOUNTER
[facility] Transitions Initial Follow Up Call    Outreach made within 2 business days of discharge: Yes    Patient: Doyle Shook Patient : 1950   MRN: [id number]  Reason for Admission: There are no discharge diagnoses documented for the most recent discharge. Discharge Date: 10/9/21       Spoke with: [facility]    Discharge department/facility: [facility] Interactive Patient Contact:  Was patient able to fill all prescriptions:   Was patient instructed to bring all medications to the follow-up visit:   Is patient taking all medications as directed in the discharge summary?    Does patient understand their discharge instructions:   Does patient have questions or concerns that need addressed prior to 7-14 day follow up office visit:   Scheduled appointment with PCP within 7-14 days    Follow Up  Future Appointments   Date Time Provider Clarence Mas   10/19/2021 11:00 AM Georgia Ferguson MD  [facility]   10/26/2021  9:20 AM John Whiting APRN - CNP [id number] [facility]   2021 10:00 AM Alex Olivera DPM [facility]   12/10/2021 10:00 [facility]       Angela Carlos MA

## 2021-10-13 ENCOUNTER — HOSPITAL ENCOUNTER (OUTPATIENT)
Age: 71
Setting detail: SPECIMEN
Discharge: HOME | End: 2021-10-13
Payer: MEDICAID

## 2021-10-13 LAB
ALBUMIN SERPL-MCNC: 3.5 G/DL (ref 3.5–5.2)
ALBUMIN/GLOBULIN RATIO: 1.2 (ref 1–2.5)
ALP BLD-CCNC: 60 U/L (ref 35–104)
ALT SERPL-CCNC: 27 U/L (ref 5–33)
ANION GAP SERPL CALCULATED.3IONS-SCNC: 14 MMOL/L (ref 9–17)
AST SERPL-CCNC: 31 U/L
BILIRUB SERPL-MCNC: 0.23 MG/DL (ref 0.3–1.2)
BUN BLDV-MCNC: 23 MG/DL (ref 8–23)
BUN/CREAT BLD: ABNORMAL (ref 9–20)
CALCIUM SERPL-MCNC: 9.4 MG/DL (ref 8.6–10.4)
CHLORIDE BLD-SCNC: 99 MMOL/L (ref 98–107)
CO2: 26 MMOL/L (ref 20–31)
CREAT SERPL-MCNC: 1.18 MG/DL (ref 0.5–0.9)
GFR AFRICAN AMERICAN: 55 ML/MIN
GFR NON-AFRICAN AMERICAN: 45 ML/MIN
GFR SERPL CREATININE-BSD FRML MDRD: ABNORMAL ML/MIN/{1.73_M2}
GFR SERPL CREATININE-BSD FRML MDRD: ABNORMAL ML/MIN/{1.73_M2}
GLUCOSE BLD-MCNC: 118 MG/DL (ref 70–99)
HCT VFR BLD CALC: 38.1 % (ref 36.3–47.1)
HEMOGLOBIN: 11.7 G/DL (ref 11.9–15.1)
MCH RBC QN AUTO: 30.2 PG (ref 25.2–33.5)
MCHC RBC AUTO-ENTMCNC: 30.7 G/DL (ref 28.4–34.8)
MCV RBC AUTO: 98.4 FL (ref 82.6–102.9)
NRBC AUTOMATED: 0 PER 100 WBC
PDW BLD-RTO: 12.5 % (ref 11.8–14.4)
PLATELET # BLD: 170 K/UL (ref 138–453)
PMV BLD AUTO: 12.5 FL (ref 8.1–13.5)
POTASSIUM SERPL-SCNC: 4.4 MMOL/L (ref 3.7–5.3)
RBC # BLD: 3.87 M/UL (ref 3.95–5.11)
SODIUM BLD-SCNC: 139 MMOL/L (ref 135–144)
TOTAL PROTEIN: 6.5 G/DL (ref 6.4–8.3)
WBC # BLD: 6.7 K/UL (ref 3.5–11.3)

## 2021-10-13 PROCEDURE — P9603 ONE-WAY ALLOW PRORATED MILES: HCPCS

## 2021-10-13 PROCEDURE — 85027 COMPLETE CBC AUTOMATED: CPT

## 2021-10-13 PROCEDURE — 36415 COLL VENOUS BLD VENIPUNCTURE: CPT

## 2021-10-13 PROCEDURE — 80053 COMPREHEN METABOLIC PANEL: CPT

## 2021-10-18 ENCOUNTER — TELEPHONE (OUTPATIENT)
Dept: PAIN MANAGEMENT | Age: 71
End: 2021-10-18

## 2021-10-18 NOTE — TELEPHONE ENCOUNTER
I returned patient's call; ARLIN on her VM. Patient leaves message stating she is currently in a rehab and will not be able to keep her appointment on the 26th. On her identified VM I stated to call this office when she is released, and we will reschedule her appointment.

## 2021-10-25 DIAGNOSIS — Z79.4 TYPE 2 DIABETES MELLITUS WITH DIABETIC POLYNEUROPATHY, WITH LONG-TERM CURRENT USE OF INSULIN (HCC): ICD-10-CM

## 2021-10-25 DIAGNOSIS — E11.42 TYPE 2 DIABETES MELLITUS WITH DIABETIC POLYNEUROPATHY, WITH LONG-TERM CURRENT USE OF INSULIN (HCC): ICD-10-CM

## 2021-10-25 RX ORDER — CLOPIDOGREL BISULFATE 75 MG/1
TABLET ORAL
Qty: 90 TABLET | Refills: 1 | Status: SHIPPED | OUTPATIENT
Start: 2021-10-25 | End: 2022-05-10 | Stop reason: SDUPTHER

## 2021-10-25 RX ORDER — TOPIRAMATE 100 MG/1
100 TABLET, FILM COATED ORAL NIGHTLY
Qty: 90 TABLET | Refills: 2 | Status: SHIPPED | OUTPATIENT
Start: 2021-10-25

## 2021-10-25 RX ORDER — INSULIN GLARGINE 100 [IU]/ML
50 INJECTION, SOLUTION SUBCUTANEOUS 2 TIMES DAILY
Qty: 10 ML | Refills: 2 | Status: SHIPPED | OUTPATIENT
Start: 2021-10-25 | End: 2021-10-26 | Stop reason: SDUPTHER

## 2021-10-25 NOTE — TELEPHONE ENCOUNTER
Please Approve or Refuse.   Send to Pharmacy per Pt's Request:     Next Visit Date:  Visit date not found   Last Visit Date: 2/25/2021    Hemoglobin A1C (%)   Date Value   02/25/2021 7.8   09/14/2020 10.6   02/12/2020 8.4             ( goal A1C is < 7)   BP Readings from Last 3 Encounters:   10/09/21 (!) 116/56   05/26/21 (!) 110/90   04/30/21 120/76          (goal 120/80)  BUN   Date Value Ref Range Status   10/13/2021 23 8 - 23 mg/dL Final     CREATININE   Date Value Ref Range Status   10/13/2021 1.18 (H) 0.50 - 0.90 mg/dL Final     Potassium   Date Value Ref Range Status   10/13/2021 4.4 3.7 - 5.3 mmol/L Final

## 2021-10-26 ENCOUNTER — TELEPHONE (OUTPATIENT)
Dept: FAMILY MEDICINE CLINIC | Age: 71
End: 2021-10-26

## 2021-10-26 DIAGNOSIS — E11.42 TYPE 2 DIABETES MELLITUS WITH DIABETIC POLYNEUROPATHY, WITH LONG-TERM CURRENT USE OF INSULIN (HCC): ICD-10-CM

## 2021-10-26 DIAGNOSIS — Z79.4 TYPE 2 DIABETES MELLITUS WITH DIABETIC POLYNEUROPATHY, WITH LONG-TERM CURRENT USE OF INSULIN (HCC): ICD-10-CM

## 2021-10-26 RX ORDER — INSULIN GLARGINE 100 [IU]/ML
50 INJECTION, SOLUTION SUBCUTANEOUS 2 TIMES DAILY
Qty: 10 ML | Refills: 2 | Status: SHIPPED | OUTPATIENT
Start: 2021-10-26 | End: 2022-03-01

## 2021-10-26 NOTE — TELEPHONE ENCOUNTER
----- Message from [facility] sent at 10/25/2021  4:37 PM EDT -----  Subject: Medication Problem    QUESTIONS  Name of Medication? insulin glargine (BASAGLAR KWIKPEN) 100 UNIT/ML   injection pen  Patient-reported dosage and instructions? 10ml  What question or problem do you have with the medication? Quantity   prescribed says 10 ml the pharmacy would like this to be changed to 30 ml   to last 30 days. Preferred Pharmacy? [address] [facility], [address] [facility] phone number (if available)? [phone]  Additional Information for Provider?   ---------------------------------------------------------------------------  --------------  [address]  What is the best way for the office to contact you? OK to leave message on   voicemail  Preferred Call Back Phone Number? [phone]  ---------------------------------------------------------------------------  --------------  SCRIPT ANSWERS  Relationship to Patient? Third Party  Representative Name?  [phone]

## 2021-10-27 ENCOUNTER — TELEPHONE (OUTPATIENT)
Dept: FAMILY MEDICINE CLINIC | Age: 71
End: 2021-10-27

## 2021-10-27 DIAGNOSIS — E78.2 MIXED HYPERLIPIDEMIA: Primary | ICD-10-CM

## 2021-10-27 RX ORDER — FENOFIBRATE 160 MG/1
160 TABLET ORAL DAILY
Qty: 90 TABLET | Refills: 1 | Status: SHIPPED | OUTPATIENT
Start: 2021-10-27 | End: 2022-03-30

## 2021-11-10 ENCOUNTER — HOSPITAL ENCOUNTER (OUTPATIENT)
Dept: PAIN MANAGEMENT | Age: 71
Discharge: HOME | End: 2021-11-10
Payer: COMMERCIAL

## 2021-11-10 DIAGNOSIS — M47.817 LUMBOSACRAL SPONDYLOSIS WITHOUT MYELOPATHY: Chronic | ICD-10-CM

## 2021-11-10 DIAGNOSIS — M54.16 LUMBAR RADICULOPATHY, CHRONIC: ICD-10-CM

## 2021-11-10 DIAGNOSIS — F11.90 CHRONIC, CONTINUOUS USE OF OPIOIDS: Primary | ICD-10-CM

## 2021-11-10 DIAGNOSIS — G62.9 POLYNEUROPATHY, UNSPECIFIED: ICD-10-CM

## 2021-11-10 DIAGNOSIS — M46.1 SACROILIITIS, NOT ELSEWHERE CLASSIFIED (HCC): Chronic | ICD-10-CM

## 2021-11-10 DIAGNOSIS — M54.40 CHRONIC BILATERAL LOW BACK PAIN WITH SCIATICA, SCIATICA LATERALITY UNSPECIFIED: ICD-10-CM

## 2021-11-10 DIAGNOSIS — G89.29 CHRONIC BILATERAL LOW BACK PAIN WITH SCIATICA, SCIATICA LATERALITY UNSPECIFIED: ICD-10-CM

## 2021-11-10 DIAGNOSIS — M50.30 DDD (DEGENERATIVE DISC DISEASE), CERVICAL: Chronic | ICD-10-CM

## 2021-11-10 DIAGNOSIS — Z51.81 MEDICATION MONITORING ENCOUNTER: Chronic | ICD-10-CM

## 2021-11-10 DIAGNOSIS — M47.892 OTHER OSTEOARTHRITIS OF SPINE, CERVICAL REGION: Chronic | ICD-10-CM

## 2021-11-10 DIAGNOSIS — G62.9 NEUROPATHY: ICD-10-CM

## 2021-11-10 DIAGNOSIS — M51.36 DDD (DEGENERATIVE DISC DISEASE), LUMBAR: ICD-10-CM

## 2021-11-10 PROCEDURE — 99213 OFFICE O/P EST LOW 20 MIN: CPT

## 2021-11-10 PROCEDURE — 99442 PR PHYS/QHP TELEPHONE EVALUATION 11-20 MIN: CPT | Performed by: NURSE PRACTITIONER

## 2021-11-10 RX ORDER — OXYCODONE HYDROCHLORIDE AND ACETAMINOPHEN 5; 325 MG/1; MG/1
1 TABLET ORAL SEE ADMIN INSTRUCTIONS
Qty: 100 TABLET | Refills: 0 | Status: SHIPPED | OUTPATIENT
Start: 2021-11-11 | End: 2021-12-09 | Stop reason: SDUPTHER

## 2021-11-10 ASSESSMENT — ENCOUNTER SYMPTOMS
EYES NEGATIVE: 1
BACK PAIN: 1
GASTROINTESTINAL NEGATIVE: 1

## 2021-11-10 NOTE — PROGRESS NOTES
Snehal 88 PROGRESS NOTE      Patient  completed []  video visit   [x]   phone call:     12    Minutes :       [x]    to  review Medication Agreement    []  Follow up after procedure   []  Discuss treatment options      Location:  Provider:  working from    [x]    home    []   [facility] - KELLEE VILLANUEVA ,   patient at home       Chief Complaint: low back pain    She c/o low back pain which radiates down her legs. Her pain is unchanged. She is getting home PT which helps. She reports sleep is good. She does household tasks, she is trying to find an aide. She was hospitalized in the ICU  At [facility], for respiratory issues, she states had not. been using her device for sleep apnea. She was in a Rehab facility  after discharge from hospital.She uses oxygen prn. Back Pain  This is a chronic problem. The current episode started more than 1 year ago. The problem occurs constantly. The problem is unchanged. The pain is present in the lumbar spine. The quality of the pain is described as aching. Radiates to: legs. The pain is at a severity of 7/10. The pain is moderate. The pain is the same all the time. Exacerbated by: sitting too long. She has tried analgesics and heat (moving around) for the symptoms. The treatment provided mild relief. Treatment goals:  Functional status: to get off pain meds    Aberrancy:   Any alcoholic beverages   no       Any illegal drugs  no      Analgesia:      7               Adverse  Effects :      ADL;s :home PT    Data:    When was thelast UDS:   2-         Was the UDS appropriate:  [x] yes []   no      Record/Diagnostics Review:      As above, I did review the imaging     DRUG SCREEN, PAIN  Order: [id number]  Status: Final result    Visible to patient: Yes (not seen)    Next appt: 12/09/2021 at 10:00 AM in Pain Management (VERONIKA GUERIN CNP)    Dx: DDD (degenerative disc disease), lumb. ..    0 Result Notes     Ref Range & Units 2/26/21 1352 Resulting Agency   Pain Management Drug Panel Interp, Urine  Consistent  [facility]   Comment: (NOTE)   ________________________________________________________________   DRUGS EXPECTED:   OXYCODONE   ________________________________________________________________   CONSISTENT with medications provided:   OXYCODONE: based on oxycodone, noroxycodone, oxymorphone   ________________________________________________________________   INTERPRETIVE INFORMATION: Targeted drug profile Interp   Interpretation depends on accuracy and completeness of patient   medication information submitted by client. 6-Acetylmorphine, Ur  Not Detected                  EXAMINATION:   MRI OF THE LUMBAR SPINE WITHOUT AND WITH CONTRAST  10/23/2019 5:48 pm       TECHNIQUE:   Multiplanar multisequence MRI of the lumbar spine was performed without and   with the administration of intravenous contrast.       COMPARISON:   MRI lumbar spine 01/08/2018       HISTORY:   ORDERING SYSTEM PROVIDED HISTORY:   TECHNOLOGIST PROVIDED HISTORY:   back pain, weakness, epidural injection 5 weeks ago       FINDINGS:   BONES/ALIGNMENT: There is normal alignment of the spine. The vertebral body   heights are maintained. The bone marrow signal appears unremarkable. Multilevel Schmorl's nodes identified involving the lower thoracic and upper   lumbar spine.       SPINAL CORD:  The conus terminates normally.       SOFT TISSUES: No abnormal enhancement is seen of the lumbar spine.  No   paraspinal mass identified.       L1-L2: Minimal degenerative loss of disc space height and signal.  There is   no significant disc protrusion, spinal canal stenosis or neural foraminal   narrowing.       L2-L3: Moderate disc space is identified with mild circumferential disc   bulge.  Minimal central canal stenosis.  Mild facet arthropathy.  No central   foramina.  No focal disc protrusion.       L3-L4: Mild disc space disease identified with circumferential disc bulge.    There is mild NERVE/DR. GRACIA    Benign hypertension with CKD (chronic kidney disease) stage III (HCC)     CAD (coronary artery disease) 3/21/2013    Caffeine use     2 coffee/day    CHF (congestive heart failure) (HCC)     Chronic kidney disease     CKD (chronic kidney disease) stage 3, GFR 30-59 ml/min (HCC)     COPD (chronic obstructive pulmonary disease) ([facility])     emphysema    Degeneration of lumbar or lumbosacral intervertebral disc     Depression     DM (diabetes mellitus) ([facility])     Emphysema of lung (HCC)     Gastritis     GERD (gastroesophageal reflux disease)     Hearing loss     Hematuria     Hiatal hernia     HTN (hypertension), benign 6/28/2014    Hypertriglyceridemia     Incontinence of urine 9/25/2013    Irritable bowel syndrome with both constipation and diarrhea 1/26/2021    Knee arthropathy     Lumbosacral spondylosis without myelopathy 9/29/2014    Migraine headache     DR. GRACIA    Mumps     Neuropathy     Obesity     On home oxygen therapy     2 L PER [place]  HS AND PRN    HIGINIO on CPAP     Otitis media 1-26-15    Secondary diabetes mellitus with stage 3 chronic kidney disease (GFR 30-59) ([facility])     Stroke ([facility] [address]      mini stroke.     Tinnitus     Type 2 diabetes mellitus without complication ([facility])     Type II or unspecified type diabetes mellitus without mention of complication, not stated as uncontrolled     UTI (lower urinary tract infection)     Varicose vein        Past Surgical History:   Procedure Laterality Date    ABLATION OF DYSRHYTHMIC FOCUS  2000    CARPAL TUNNEL RELEASE Right     CATARACT REMOVAL WITH IMPLANT Bilateral     CHOLECYSTECTOMY  2007    COLONOSCOPY      COLONOSCOPY  04/10/2017    tubular adenomo polyp , mod. sigmoid diverticulosis, min. int. hemorrhoids    COLONOSCOPY N/A 3/2/2021    COLONOSCOPY WITH BIOPSY performed by Brad Coles MD at [place] [address]  9/25/2013   [phone] [facility]    EYE SURGERY      laser    INCONTINENCE SURGERY      Percutaneous nerve stimulation Dr Nakia Amin Nov 2013    Courtenay Spurling INSERTABLE CARDIAC MONITOR  12/04/2015    [employer] REVEAL LINQ MODEL #[id number] MRI CONDTIONAL OK 3T. IMMEDIATELY POST IMPLANT    OTHER SURGICAL HISTORY  09/10/15    removal of interstim    TX COLSC FLX W/REMOVAL LESION BY HOT BX FORCEPS N/A 4/10/2017    COLONOSCOPY POLYPECTOMY HOT BIOPSY performed by Theresa Gupta MD at [address] [place] [address]      TYMPANOPLASTY      TYMPANOPLASTY      TYMPANOSTOMY TUBE PLACEMENT  08/21/2017    UPPER GASTROINTESTINAL ENDOSCOPY  03/2016    Dr Hanna Major N/A 3/2/2021    EGD BIOPSY performed by Jagdeep Santoyo MD at [facility]       Allergies   Allergen Reactions    Robitussin [Guaifenesin] Anaphylaxis    Codeine Swelling    Compazine [Prochlorperazine Maleate] Hives and Swelling    Iodides Itching    Morphine Other (See Comments)     hallucinations    Moxifloxacin      Other reaction(s): Intolerance-unknown  Other reaction(s):  Intolerance-unknown    Reglan [Metoclopramide] Nausea Only    Avelox [Moxifloxacin Hcl In Nacl] Rash     Dermatitis      Cipro Xr Rash     dermatitis    Sulfa Antibiotics Rash         Current Outpatient Medications:     fenofibrate (TRIGLIDE) 160 MG tablet, Take 1 tablet by mouth daily, Disp: 90 tablet, Rfl: 1    insulin glargine (BASAGLAR KWIKPEN) 100 UNIT/ML injection pen, Inject 50 Units into the skin 2 times daily, Disp: 10 mL, Rfl: 2    topiramate (TOPAMAX) 100 MG tablet, TAKE 1 TABLET BY MOUTH NIGHTLY , Disp: 90 tablet, Rfl: 2    clopidogrel (PLAVIX) 75 MG tablet, TAKE 1 TAB BY MOUTH ONCE A DAY ( IN THE MORNING ) -THIS MEDICINE MAY BE TAKENWITH OR WITHOUT FOOD , Disp: 90 tablet, Rfl: 1    metFORMIN (GLUCOPHAGE) 1000 MG tablet, TAKE 1 TAB BY MOUTH TWICE A DAY ( IN THE MORNING AND BEDTIME ) , Disp: 180 tablet, Rfl: 3    ASPIRIN LOW DOSE 81 MG EC tablet, TAKE 1 TAB BY MOUTH ONCE A DAY , Disp: 90 tablet, Rfl: 3   atorvastatin (LIPITOR) 40 MG tablet, Take 1 tablet by mouth daily, Disp: 90 tablet, Rfl: 3    Multiple Vitamins-Minerals (THERAPEUTIC MULTIVITAMIN-MINERALS) tablet, Take 1 tablet by mouth daily Takes Women over 50 Complete Vitamin daily ([employer] brand), Disp: , Rfl:     ferrous sulfate (IRON 325) 325 (65 Fe) MG tablet, TAKE 1 TAB BY MOUTH EVERY MORNING , Disp: 90 tablet, Rfl: 5    citalopram (CELEXA) 40 MG tablet, TAKE 1 2 TABLET BY MOUTH TWICE A DAY (Patient taking differently: Take 20 mg by mouth 2 times daily ), Disp: 90 tablet, Rfl: 3    losartan (COZAAR) 25 MG tablet, TAKE 1 TAB BY MOUTH ONCE A DAY ( IN THE MORNING ), Disp: 90 tablet, Rfl: 5    magnesium oxide (MAG-OX) 400 MG tablet, TAKE 1 TAB BY MOUTH TWICE A DAY ( IN THE MORNING AND BEDTIME ), Disp: 180 tablet, Rfl: 3    Icosapent Ethyl (VASCEPA) 1 g CAPS capsule, Take 1 capsule by mouth 2 times daily, Disp: 60 capsule, Rfl: 3    vitamin B-12 (CYANOCOBALAMIN) 100 MCG tablet, Take 50 mcg by mouth daily, Disp: , Rfl:     isosorbide dinitrate (ISORDIL) 30 MG tablet, Take 30 mg by mouth, Disp: , Rfl:     gabapentin (NEURONTIN) 300 MG capsule, Take 1 capsule by mouth 3 times daily for 30 days. (Patient taking differently: Take 300 mg by mouth 2 times daily. ), Disp: 90 capsule, Rfl: 2    nystatin (MYCOSTATIN) 201911 UNIT/GM powder, Apply 3 times daily. , Disp: 3 Bottle, Rfl: 3    pantoprazole (PROTONIX) 40 MG tablet, Take 1 tablet by mouth 2 times daily, Disp: 60 tablet, Rfl: 3    Alcohol Swabs (B-D SINGLE USE SWABS REGULAR) PADS, THREE TIMES A DAY, Disp: 100 each, Rfl: 0    blood glucose test strips (ONETOUCH ULTRA) strip, TEST TWO (2) TO THREE (3) TIMES A DAY & AS NEEDED FOR SYMPTOMS OF IRREGULAR BLOOD GLUCOSE, Disp: 100 strip, Rfl: 0    blood glucose test strips (ONETOUCH ULTRA) strip, TEST TWO (2) TO THREE (3) TIMES A DAY & AS NEEDED FOR SYMPTOMS OF IRREGULAR BLOOD GLUCOSE, Disp: 100 strip, Rfl: 0    Blood Glucose Monitoring Suppl (ONE TOUCH ULTRA 2) w/Device KIT, , Disp: , Rfl:     ONETOUCH ULTRA strip, TEST TWO (2) TO THREE (3) TIMES A DAY& AS NEEDED , Disp: 100 each, Rfl: 0    insulin aspart (NOVOLOG FLEXPEN) 100 UNIT/ML injection pen, IF <139 - NO INSULIN; 140-199-2 UNITS;200-249-4 UNITS;250-299-6 UNITS;300-349-8 UNITS;350-400=10 UNITS;ABOVE 400-12 UNITS, Disp: 15 mL, Rfl: 3    BiPAP Machine MISC, repair/replace Bipap maintain 18/9CMH2O, Cflex=3,mask,tubing,filters,headgear,heated humidifier,all supplies PRN, Disp: , Rfl:     Psyllium (METAMUCIL PO), Take by mouth 3 times daily Takes powder, Disp: , Rfl:     blood glucose test strips (TRUE METRIX BLOOD GLUCOSE TEST) strip, THREE TIMES A DAY, Disp: 100 each, Rfl: 3    carvedilol (COREG) 3.125 MG tablet, TAKE 1 TAB BY MOUTH TWICE A DAY ( IN THE MORNING AND BEDTIME ) , Disp: 180 tablet, Rfl: 3    dicyclomine (BENTYL) 10 MG capsule, Take 1 capsule by mouth 2 times daily as needed (abdominal spasm), Disp: 180 capsule, Rfl: 0    albuterol (PROVENTIL) (2.5 MG/3ML) 0.083% nebulizer solution, Take 3 mLs by nebulization every 6 hours as needed for Wheezing, Disp: 120 each, Rfl: 3    furosemide (LASIX) 20 MG tablet, TAKE 1 TAB BY MOUTH ONCE A DAY AS NEEDED ( WEIGHT GAIN 3 LBS OVERNIGHT ) , Disp: 30 tablet, Rfl: 3    ULTICARE MINI PEN NEEDLES 31G X 6 MM MISC, USE AS DIRECTED , Disp: 100 each, Rfl: 5    lidocaine (LMX) 4 % cream, Apply topically every 8 hrs as needed for pain, Disp: 45 g, Rfl: 3    Lift Chair MISC, by Does not apply route, Disp: 1 each, Rfl: 0    Misc.  Devices (ADJUST BATH/SHOWER SEAT/BACK) MISC, Use daily for shower, Disp: 1 each, Rfl: 0    nitroGLYCERIN (NITROSTAT) 0.4 MG SL tablet, 1 under the tongue as needed for angina, may repeat q5mins for up three doses, Disp: , Rfl:     Blood Glucose Monitoring Suppl HARMEET, Use daily to check BS, Disp: 1 Device, Rfl: 0    ipratropium-albuterol (DUONEB) 0.5-2.5 (3) MG/3ML SOLN nebulizer solution, Inhale 3 mLs into the lungs every 4 hours, Disp: 360 mL, Rfl: 2    Melatonin 10 MG TABS, Take 10 mg by mouth nightly, Disp: 90 tablet, Rfl: 3    Compression Stockings MISC, by Does not apply route Pressure between 20 - 25 ., Disp: 1 each, Rfl: 0    docusate sodium (COLACE) 100 MG capsule, Take 1 capsule by mouth 2 times daily Please use while taking Percocet, Disp: 30 capsule, Rfl: 0    SYMBICORT 160-4.5 MCG/ACT AERO,  2 puffs As needed for sob, Disp: , Rfl:     OXYGEN, Inhale 3 L into the lungs , Disp: , Rfl:     Family History   Problem Relation Age of Onset    Diabetes Mother     Heart Disease Mother     Stomach Cancer Father     Diabetes Maternal Grandmother         also aunts and uncles (maternal)    Emphysema Sister        Social History     Socioeconomic History    Marital status: Legally      Spouse name: Not on file    Number of children: Not on file    Years of education: Not on file    Highest education level: Not on file   Occupational History    Occupation: disabled     Employer: N/A   Tobacco Use    Smoking status: Former Smoker     Packs/day: 3.00     Years: 41.00     Pack years: 123.00     Types: Cigarettes     Quit date: 2000     Years since quittin.8    Smokeless tobacco: Never Used    Tobacco comment: quit in    Vaping Use    Vaping Use: Never used   Substance and Sexual Activity    Alcohol use: No     Alcohol/week: 0.0 standard drinks    Drug use: No    Sexual activity: Not Currently   Other Topics Concern    Not on file   Social History Narrative    Not on file     Social Determinants of Health     Financial Resource Strain:     Difficulty of Paying Living Expenses: Not on file   Food Insecurity:     Worried About Running Out of Food in the Last Year: Not on file    Nadir of Food in the Last Year: Not on file   Transportation Needs:     Lack of Transportation (Medical): Not on file    Lack of Transportation (Non-Medical):  Not on file   Physical Activity:     Days of Exercise per Week: Not on file    Minutes of Exercise per Session: Not on file   Stress:     Feeling of Stress : Not on file   Social Connections:     Frequency of Communication with Friends and Family: Not on file    Frequency of Social Gatherings with Friends and Family: Not on file    Attends Rastafari Services: Not on file    Active Member of Clubs or Organizations: Not on file    Attends Club or Organization Meetings: Not on file    Marital Status: Not on file   Intimate Partner Violence:     Fear of Current or Ex-Partner: Not on file    Emotionally Abused: Not on file    Physically Abused: Not on file    Sexually Abused: Not on file   Housing Stability:     Unable to Pay for Housing in the Last Year: Not on file    Number of [place] in the Last Year: Not on file    Unstable Housing in the Last Year: Not on file         Review of Systems:  Review of Systems   Constitutional: Negative. HENT: Positive for hearing loss. Hearing aids   Eyes: Negative. Cardiovascular: Negative. Respiratory:        Sleep apnea, uses device  Uses oxygen prn   Endocrine:        Diabetic blood sugar 125-130   Hematologic/Lymphatic: Does not bruise/bleed easily. Skin: Negative. Musculoskeletal: Positive for back pain and joint pain. Hip pain   Gastrointestinal: Negative. Genitourinary: Negative. Neurological: Positive for dizziness. Uses walker   Psychiatric/Behavioral: Negative. Physical Exam:  LMP  (LMP Unknown)     Physical Exam  Skin:         Neurological:      Mental Status: She is alert and oriented to person, place, and time. Psychiatric:         Mood and Affect: Mood normal.         Thought Content:  Thought content normal.           Assessment:      Problem List Items Addressed This Visit     Polyneuropathy, unspecified    Osteoarthritis of cervical spine (Chronic)    Neuropathy    Medication monitoring encounter (Chronic)    Lumbosacral spondylosis without myelopathy (Chronic) Lumbar radiculopathy, chronic    DDD (degenerative disc disease), lumbar    DDD (degenerative disc disease), cervical (Chronic)    Chronic, continuous use of opioids - Primary    Bilateral low back pain with sciatica            Treatment Plan:  DISCUSSION: Treatment options discussed withpatient and all questions answered to patient's satisfaction. Possible side effects, risk of tolerance and or dependence and alternative treatments discussed    Obtaining appropriate analgesic effect of treatment   No signs of potential drug abuse or diversion identified    [x] Ill effects of being on chronic pain medications such as sleep disturbances, respiratory depression, hormonal changes, withdrawal symptoms, chronic opioid dependence and tolerance as well as risk of taking opioids with Benzodiazepines and taking opioids along with alcohol,  werediscussed with patient. I had asked the patient to minimize medication use and utilize pain medications only for uncontrolled rest pain or pain with exertional activities. I advised patient not to self-escalate painmedications without consulting with us. At each of patient's future visits we will try to taper pain medications, while adjusting the adjunct medications, and re-evaluating for Physical Therapy to improve spinal andjoint strength. We will continue to have discussions to decrease pain medications as tolerated. Counseled patient on effects their pain medication and /or their medical condition mayhave on their  ability to drive or operate machinery. Instructed not to drive or operate machinery if drowsy     I also discussed with the patient regarding the dangers of combining narcotic pain medication with tranquilizers, alcohol or illegal drugs or taking the medication any way other than prescribed. The dangers were discussed  including respiratory depression and death.  Patient was told to tell  all  physicians regarding the medications he is getting from pain clinic. Patient is warned not to take any unprescribed medications over-the-countermedications that can depress breathing . Patient is advised to talk to the pharmacist or physicians if planning to take any over-the-counter medications before  takeing them. Patient is strongly advised to avoid tranquilizers or  relaxants, illegal drugs  or any medications that can depress breathing  Patient is also advised to tell us if there is any changes in their medications from other physicians.             TREATMENT OPTIONS:       Medication Agreement Requirements Met  Continue Opioid therapy  Script written for  percocet  Follow up appointment made

## 2021-11-24 DIAGNOSIS — F32.A ANXIETY AND DEPRESSION: ICD-10-CM

## 2021-11-24 DIAGNOSIS — K21.00 GASTROESOPHAGEAL REFLUX DISEASE WITH ESOPHAGITIS: ICD-10-CM

## 2021-11-24 DIAGNOSIS — I25.83 CORONARY ARTERY DISEASE DUE TO LIPID RICH PLAQUE: ICD-10-CM

## 2021-11-24 DIAGNOSIS — Z79.4 TYPE 2 DIABETES MELLITUS WITH DIABETIC POLYNEUROPATHY, WITH LONG-TERM CURRENT USE OF INSULIN (HCC): ICD-10-CM

## 2021-11-24 DIAGNOSIS — K59.01 SLOW TRANSIT CONSTIPATION: ICD-10-CM

## 2021-11-24 DIAGNOSIS — I12.9 BENIGN HYPERTENSION WITH CKD (CHRONIC KIDNEY DISEASE) STAGE III (HCC): ICD-10-CM

## 2021-11-24 DIAGNOSIS — I50.9 CHRONIC CONGESTIVE HEART FAILURE, UNSPECIFIED HEART FAILURE TYPE (HCC): ICD-10-CM

## 2021-11-24 DIAGNOSIS — E78.2 MIXED HYPERLIPIDEMIA: Chronic | ICD-10-CM

## 2021-11-24 DIAGNOSIS — I25.10 CORONARY ARTERY DISEASE DUE TO LIPID RICH PLAQUE: ICD-10-CM

## 2021-11-24 DIAGNOSIS — I10 HTN (HYPERTENSION), BENIGN: Primary | ICD-10-CM

## 2021-11-24 DIAGNOSIS — E11.42 TYPE 2 DIABETES MELLITUS WITH DIABETIC POLYNEUROPATHY, WITH LONG-TERM CURRENT USE OF INSULIN (HCC): ICD-10-CM

## 2021-11-24 DIAGNOSIS — N18.30 BENIGN HYPERTENSION WITH CKD (CHRONIC KIDNEY DISEASE) STAGE III (HCC): ICD-10-CM

## 2021-11-24 DIAGNOSIS — F41.9 ANXIETY AND DEPRESSION: ICD-10-CM

## 2021-11-24 DIAGNOSIS — R10.9 ABDOMINAL SPASMS: ICD-10-CM

## 2021-11-24 RX ORDER — ASPIRIN 81 MG/1
TABLET, COATED ORAL
Qty: 30 TABLET | Refills: 0 | Status: SHIPPED | OUTPATIENT
Start: 2021-11-24 | End: 2021-12-08

## 2021-11-24 RX ORDER — FOLIC ACID/MV,IRON,MIN/LUTEIN 0.4-18-25
TABLET ORAL
Qty: 30 TABLET | Refills: 0 | Status: SHIPPED | OUTPATIENT
Start: 2021-11-24 | End: 2021-12-08

## 2021-11-24 RX ORDER — CITALOPRAM 20 MG/1
20 TABLET ORAL DAILY
Qty: 30 TABLET | Refills: 0 | Status: SHIPPED | OUTPATIENT
Start: 2021-11-24 | End: 2021-12-08

## 2021-11-24 RX ORDER — DOCUSATE SODIUM 100 MG/1
CAPSULE, LIQUID FILLED ORAL
Qty: 60 CAPSULE | Refills: 0 | Status: SHIPPED | OUTPATIENT
Start: 2021-11-24 | End: 2021-12-08

## 2021-11-24 RX ORDER — DICYCLOMINE HYDROCHLORIDE 10 MG/1
CAPSULE ORAL
Qty: 60 CAPSULE | Refills: 0 | Status: SHIPPED | OUTPATIENT
Start: 2021-11-24 | End: 2022-07-25

## 2021-11-24 RX ORDER — ICOSAPENT ETHYL 1000 MG/1
CAPSULE ORAL
Qty: 60 CAPSULE | Refills: 0 | Status: SHIPPED | OUTPATIENT
Start: 2021-11-24 | End: 2022-07-25

## 2021-11-24 RX ORDER — ISOSORBIDE DINITRATE 30 MG/1
TABLET ORAL
Qty: 30 TABLET | Refills: 0 | Status: SHIPPED | OUTPATIENT
Start: 2021-11-24 | End: 2021-12-08

## 2021-11-24 RX ORDER — PANTOPRAZOLE SODIUM 40 MG/1
TABLET, DELAYED RELEASE ORAL
Qty: 60 TABLET | Refills: 0 | Status: SHIPPED | OUTPATIENT
Start: 2021-11-24 | End: 2021-12-08

## 2021-11-24 RX ORDER — UBIDECARENONE 75 MG
CAPSULE ORAL
Qty: 30 TABLET | Refills: 0 | Status: SHIPPED | OUTPATIENT
Start: 2021-11-24 | End: 2022-07-25

## 2021-11-24 RX ORDER — FUROSEMIDE 20 MG/1
TABLET ORAL
Qty: 30 TABLET | Refills: 0 | Status: SHIPPED | OUTPATIENT
Start: 2021-11-24 | End: 2022-05-10 | Stop reason: SDUPTHER

## 2021-11-24 RX ORDER — GABAPENTIN 300 MG/1
300 CAPSULE ORAL 2 TIMES DAILY
Qty: 60 CAPSULE | Refills: 0 | OUTPATIENT
Start: 2021-11-24 | End: 2021-12-24

## 2021-11-24 NOTE — TELEPHONE ENCOUNTER
We are not giving her gabapentin  10/26/2021  2   10/26/2021  Gabapentin 300 MG Capsule    90.00  [address]   [id number]   Lag (6309)   0   Comm Ins   [place]     Current Outpatient Medications on File Prior to Visit   Medication Sig Dispense Refill    oxyCODONE-acetaminophen (PERCOCET) 5-325 MG per tablet Take 1 tablet by mouth See Admin Instructions for 30 days. Intended supply: 30 days.  1 tab 3-4 times a day prn 100 tablet 0    fenofibrate (TRIGLIDE) 160 MG tablet Take 1 tablet by mouth daily 90 tablet 1    insulin glargine (BASAGLAR KWIKPEN) 100 UNIT/ML injection pen Inject 50 Units into the skin 2 times daily 10 mL 2    topiramate (TOPAMAX) 100 MG tablet TAKE 1 TABLET BY MOUTH NIGHTLY  90 tablet 2    clopidogrel (PLAVIX) 75 MG tablet TAKE 1 TAB BY MOUTH ONCE A DAY ( IN THE MORNING ) -THIS MEDICINE MAY BE TAKENWITH OR WITHOUT FOOD  90 tablet 1    metFORMIN (GLUCOPHAGE) 1000 MG tablet TAKE 1 TAB BY MOUTH TWICE A DAY ( IN THE MORNING AND BEDTIME )  180 tablet 3    blood glucose test strips (ONETOUCH ULTRA) strip TEST TWO (2) TO THREE (3) TIMES A DAY & AS NEEDED FOR SYMPTOMS OF IRREGULAR BLOOD GLUCOSE 100 strip 0    blood glucose test strips (ONETOUCH ULTRA) strip TEST TWO (2) TO THREE (3) TIMES A DAY & AS NEEDED FOR SYMPTOMS OF IRREGULAR BLOOD GLUCOSE 100 strip 0    [DISCONTINUED] ibuprofen (ADVIL;MOTRIN) 800 MG tablet Take 1 tablet by mouth every 8 hours as needed for Pain 30 tablet 0    ASPIRIN LOW DOSE 81 MG EC tablet TAKE 1 TAB BY MOUTH ONCE A DAY  90 tablet 3    Blood Glucose Monitoring Suppl (ONE TOUCH ULTRA 2) w/Device KIT       ONETOUCH ULTRA strip TEST TWO (2) TO THREE (3) TIMES A DAY& AS NEEDED  100 each 0    insulin aspart (NOVOLOG FLEXPEN) 100 UNIT/ML injection pen IF <139 - NO INSULIN; 140-199-2 UNITS;200-249-4 UNITS;250-299-6 UNITS;300-349-8 UNITS;350-400=10 UNITS;ABOVE 400-12 UNITS 15 mL 3    atorvastatin (LIPITOR) 40 MG tablet Take 1 tablet by mouth daily 90 tablet 3    Multiple Vitamins-Minerals (THERAPEUTIC MULTIVITAMIN-MINERALS) tablet Take 1 tablet by mouth daily Takes Women over 50 Complete Vitamin daily ([employer] brand)      BiPAP Machine MISC repair/replace Bipap maintain 18/9CMH2O, Cflex=3,mask,tubing,filters,headgear,heated humidifier,all supplies PRN      Psyllium (METAMUCIL PO) Take by mouth 3 times daily Takes powder      blood glucose test strips (TRUE METRIX BLOOD GLUCOSE TEST) strip THREE TIMES A  each 3    ferrous sulfate (IRON 325) 325 (65 Fe) MG tablet TAKE 1 TAB BY MOUTH EVERY MORNING  90 tablet 5    citalopram (CELEXA) 40 MG tablet TAKE 1 2 TABLET BY MOUTH TWICE A DAY (Patient taking differently: Take 20 mg by mouth 2 times daily ) 90 tablet 3    carvedilol (COREG) 3.125 MG tablet TAKE 1 TAB BY MOUTH TWICE A DAY ( IN THE MORNING AND BEDTIME )  180 tablet 3    losartan (COZAAR) 25 MG tablet TAKE 1 TAB BY MOUTH ONCE A DAY ( IN THE MORNING ) 90 tablet 5    magnesium oxide (MAG-OX) 400 MG tablet TAKE 1 TAB BY MOUTH TWICE A DAY ( IN THE MORNING AND BEDTIME ) 180 tablet 3    dicyclomine (BENTYL) 10 MG capsule Take 1 capsule by mouth 2 times daily as needed (abdominal spasm) 180 capsule 0    albuterol (PROVENTIL) (2.5 MG/3ML) 0.083% nebulizer solution Take 3 mLs by nebulization every 6 hours as needed for Wheezing 120 each 3    furosemide (LASIX) 20 MG tablet TAKE 1 TAB BY MOUTH ONCE A DAY AS NEEDED ( WEIGHT GAIN 3 LBS OVERNIGHT )  30 tablet 3    Icosapent Ethyl (VASCEPA) 1 g CAPS capsule Take 1 capsule by mouth 2 times daily 60 capsule 3    vitamin B-12 (CYANOCOBALAMIN) 100 MCG tablet Take 50 mcg by mouth daily      isosorbide dinitrate (ISORDIL) 30 MG tablet Take 30 mg by mouth      ULTICARE MINI PEN NEEDLES 31G X 6 MM MISC USE AS DIRECTED  100 each 5    gabapentin (NEURONTIN) 300 MG capsule Take 1 capsule by mouth 3 times daily for 30 days.  (Patient taking differently: Take 300 mg by mouth 2 times daily. ) 90 capsule 2    nystatin (MYCOSTATIN) 011838 UNIT/GM powder Apply 3 times daily. 3 Bottle 3    lidocaine (LMX) 4 % cream Apply topically every 8 hrs as needed for pain 45 g 3    Lift Chair MISC by Does not apply route 1 each 0    Misc. Devices (ADJUST BATH/SHOWER SEAT/BACK) MISC Use daily for shower 1 each 0    pantoprazole (PROTONIX) 40 MG tablet Take 1 tablet by mouth 2 times daily 60 tablet 3    Alcohol Swabs (B-D SINGLE USE SWABS REGULAR) PADS THREE TIMES A  each 0    nitroGLYCERIN (NITROSTAT) 0.4 MG SL tablet 1 under the tongue as needed for angina, may repeat q5mins for up three doses      Blood Glucose Monitoring Suppl HARMEET Use daily to check BS 1 Device 0    ipratropium-albuterol (DUONEB) 0.5-2.5 (3) MG/3ML SOLN nebulizer solution Inhale 3 mLs into the lungs every 4 hours 360 mL 2    Melatonin 10 MG TABS Take 10 mg by mouth nightly 90 tablet 3    Compression Stockings MISC by Does not apply route Pressure between 20 - 25 . 1 each 0    docusate sodium (COLACE) 100 MG capsule Take 1 capsule by mouth 2 times daily Please use while taking Percocet 30 capsule 0    SYMBICORT 160-4.5 MCG/ACT AERO   2 puffs As needed for sob      OXYGEN Inhale 3 L into the lungs        No current facility-administered medications on file prior to visit.

## 2021-12-08 DIAGNOSIS — K21.00 GASTROESOPHAGEAL REFLUX DISEASE WITH ESOPHAGITIS: ICD-10-CM

## 2021-12-08 DIAGNOSIS — E11.42 TYPE 2 DIABETES MELLITUS WITH DIABETIC POLYNEUROPATHY, WITH LONG-TERM CURRENT USE OF INSULIN (HCC): ICD-10-CM

## 2021-12-08 DIAGNOSIS — K59.01 SLOW TRANSIT CONSTIPATION: ICD-10-CM

## 2021-12-08 DIAGNOSIS — F41.9 ANXIETY AND DEPRESSION: ICD-10-CM

## 2021-12-08 DIAGNOSIS — I25.83 CORONARY ARTERY DISEASE DUE TO LIPID RICH PLAQUE: ICD-10-CM

## 2021-12-08 DIAGNOSIS — I25.10 CORONARY ARTERY DISEASE DUE TO LIPID RICH PLAQUE: ICD-10-CM

## 2021-12-08 DIAGNOSIS — Z79.4 TYPE 2 DIABETES MELLITUS WITH DIABETIC POLYNEUROPATHY, WITH LONG-TERM CURRENT USE OF INSULIN (HCC): ICD-10-CM

## 2021-12-08 DIAGNOSIS — F32.A ANXIETY AND DEPRESSION: ICD-10-CM

## 2021-12-08 RX ORDER — FOLIC ACID/MV,IRON,MIN/LUTEIN 0.4-18-25
TABLET ORAL
Qty: 30 TABLET | Refills: 5 | Status: SHIPPED | OUTPATIENT
Start: 2021-12-08

## 2021-12-08 RX ORDER — ISOSORBIDE DINITRATE 30 MG/1
TABLET ORAL
Qty: 30 TABLET | Refills: 5 | Status: SHIPPED | OUTPATIENT
Start: 2021-12-08 | End: 2022-07-18

## 2021-12-08 RX ORDER — DOCUSATE SODIUM 100 MG/1
CAPSULE, LIQUID FILLED ORAL
Qty: 60 CAPSULE | Refills: 5 | Status: SHIPPED | OUTPATIENT
Start: 2021-12-08 | End: 2022-07-18

## 2021-12-08 RX ORDER — CITALOPRAM 20 MG/1
20 TABLET ORAL DAILY
Qty: 30 TABLET | Refills: 5 | Status: SHIPPED | OUTPATIENT
Start: 2021-12-08 | End: 2022-05-10

## 2021-12-08 RX ORDER — ASPIRIN 81 MG/1
TABLET ORAL
Qty: 90 TABLET | Refills: 1 | Status: SHIPPED | OUTPATIENT
Start: 2021-12-08 | End: 2022-07-18

## 2021-12-08 RX ORDER — PANTOPRAZOLE SODIUM 40 MG/1
TABLET, DELAYED RELEASE ORAL
Qty: 60 TABLET | Refills: 5 | Status: SHIPPED | OUTPATIENT
Start: 2021-12-08 | End: 2022-03-31 | Stop reason: SDUPTHER

## 2021-12-09 ENCOUNTER — HOSPITAL ENCOUNTER (OUTPATIENT)
Dept: PAIN MANAGEMENT | Age: 71
Discharge: HOME | End: 2021-12-09
Payer: COMMERCIAL

## 2021-12-09 DIAGNOSIS — G89.29 CHRONIC BILATERAL LOW BACK PAIN WITH SCIATICA, SCIATICA LATERALITY UNSPECIFIED: ICD-10-CM

## 2021-12-09 DIAGNOSIS — M50.30 DDD (DEGENERATIVE DISC DISEASE), CERVICAL: Chronic | ICD-10-CM

## 2021-12-09 DIAGNOSIS — Z79.4 TYPE 2 DIABETES MELLITUS WITH DIABETIC POLYNEUROPATHY, WITH LONG-TERM CURRENT USE OF INSULIN (HCC): Chronic | ICD-10-CM

## 2021-12-09 DIAGNOSIS — M47.892 OTHER OSTEOARTHRITIS OF SPINE, CERVICAL REGION: Chronic | ICD-10-CM

## 2021-12-09 DIAGNOSIS — M46.1 SACROILIITIS, NOT ELSEWHERE CLASSIFIED (HCC): Chronic | ICD-10-CM

## 2021-12-09 DIAGNOSIS — M54.16 LUMBAR RADICULOPATHY, CHRONIC: ICD-10-CM

## 2021-12-09 DIAGNOSIS — F11.90 CHRONIC, CONTINUOUS USE OF OPIOIDS: Primary | ICD-10-CM

## 2021-12-09 DIAGNOSIS — G62.9 POLYNEUROPATHY, UNSPECIFIED: ICD-10-CM

## 2021-12-09 DIAGNOSIS — E11.42 TYPE 2 DIABETES MELLITUS WITH DIABETIC POLYNEUROPATHY, WITH LONG-TERM CURRENT USE OF INSULIN (HCC): Chronic | ICD-10-CM

## 2021-12-09 DIAGNOSIS — E66.01 OBESITY, CLASS III, BMI 40-49.9 (MORBID OBESITY) (HCC): ICD-10-CM

## 2021-12-09 DIAGNOSIS — M51.36 DDD (DEGENERATIVE DISC DISEASE), LUMBAR: ICD-10-CM

## 2021-12-09 DIAGNOSIS — M47.817 LUMBOSACRAL SPONDYLOSIS WITHOUT MYELOPATHY: Chronic | ICD-10-CM

## 2021-12-09 DIAGNOSIS — Z51.81 MEDICATION MONITORING ENCOUNTER: Chronic | ICD-10-CM

## 2021-12-09 DIAGNOSIS — M54.40 CHRONIC BILATERAL LOW BACK PAIN WITH SCIATICA, SCIATICA LATERALITY UNSPECIFIED: ICD-10-CM

## 2021-12-09 DIAGNOSIS — M47.9 SPONDYLARTHROSIS: ICD-10-CM

## 2021-12-09 PROCEDURE — 99213 OFFICE O/P EST LOW 20 MIN: CPT

## 2021-12-09 PROCEDURE — 99442 PR PHYS/QHP TELEPHONE EVALUATION 11-20 MIN: CPT | Performed by: NURSE PRACTITIONER

## 2021-12-09 RX ORDER — OXYCODONE HYDROCHLORIDE AND ACETAMINOPHEN 5; 325 MG/1; MG/1
1 TABLET ORAL SEE ADMIN INSTRUCTIONS
Qty: 100 TABLET | Refills: 0 | Status: SHIPPED | OUTPATIENT
Start: 2021-12-11 | End: 2022-01-04 | Stop reason: SDUPTHER

## 2021-12-09 ASSESSMENT — ENCOUNTER SYMPTOMS
GASTROINTESTINAL NEGATIVE: 1
BACK PAIN: 1

## 2021-12-09 NOTE — PROGRESS NOTES
oxymorphone   ________________________________________________________________   INTERPRETIVE INFORMATION: Targeted drug profile Interp   Interpretation depends on accuracy and completeness of patient   medication information submitted by client. 6-Acetylmorphine, Ur  Not Detected  [facility]   7-Aminoclonazepam, Urine  Not Detected  [facility]          EXAMINATION:   MRI OF THE LUMBAR SPINE WITHOUT AND WITH CONTRAST  10/23/2019 5:48 pm       TECHNIQUE:   Multiplanar multisequence MRI of the lumbar spine was performed without and   with the administration of intravenous contrast.       COMPARISON:   MRI lumbar spine 01/08/2018       HISTORY:   ORDERING SYSTEM PROVIDED HISTORY:   TECHNOLOGIST PROVIDED HISTORY:   back pain, weakness, epidural injection 5 weeks ago       FINDINGS:   BONES/ALIGNMENT: There is normal alignment of the spine. The vertebral body   heights are maintained. The bone marrow signal appears unremarkable. Multilevel Schmorl's nodes identified involving the lower thoracic and upper   lumbar spine.       SPINAL CORD:  The conus terminates normally.       SOFT TISSUES: No abnormal enhancement is seen of the lumbar spine.  No   paraspinal mass identified.       L1-L2: Minimal degenerative loss of disc space height and signal.  There is   no significant disc protrusion, spinal canal stenosis or neural foraminal   narrowing.       L2-L3: Moderate disc space is identified with mild circumferential disc   bulge.  Minimal central canal stenosis.  Mild facet arthropathy.  No central   foramina.  No focal disc protrusion.       L3-L4: Mild disc space disease identified with circumferential disc bulge. There is mild right and moderate left neural foraminal narrowing.  Moderate   degenerate facet arthropathy.  Mild central canal stenosis.       L4-L5: Mild disc space disease identified with circumferential disc bulge.    There is a right paracentral disc extrusion which extends caudally 1.2 cm and   effaces the right lateral recess affecting the traversing right L5 nerve   root.  Otherwise mild neural foraminal narrowing.  No central canal stenosis. Moderate facet arthropathy.       L5-S1: Minimal degenerative loss of disc space height and signal.  No focal   disc protrusion.  Minimal neural foraminal narrowing.  Mild-to-moderate facet   arthropathy.           Impression   Right paracentral disc extrusion L4-5 narrowing the right lateral recess. Please correlate with possible right L5 radiculopathy.  This is new from   previous MRI.       Otherwise mild degenerate change.       No evidence of discitis osteomyelitis or epidural abscess.                       Pill count: appropriate    fill date :12-    Morphine equivalent dose as reported on [place]ARRS:  25  Periodic Controlled Substance Monitoring: Possible medication side effects, risk of tolerance/dependence & alternative treatments discussed., No signs of potential drug abuse or diversion identified. , Assessed functional status., Obtaining appropriate analgesic effect of treatment. VERONIKA Gaviria - CNP)  Review ofOARRS does not show any aberrant prescription behavior. Medication is helping the patient stay active. Patient denies any side effects and reports adequate analgesia. No sign of misuse/abuse. Past Medical History:   Diagnosis Date    Abdominal bloating 1/26/2021    Adenomatous polyp of ascending colon 1/26/2021    Allergic rhinitis     Anxiety 7/17/2013    Arthritis     Asthma     Atrial fibrillation (HCC)     Back pain     NERVE/DR. GRACIA    Benign hypertension with CKD (chronic kidney disease) stage III ([facility])     CAD (coronary artery disease) 3/21/2013    Caffeine use     2 coffee/day    CHF (congestive heart failure) ([facility])     Chronic kidney disease     CKD (chronic kidney disease) stage 3, GFR 30-59 ml/min ([facility])     COPD (chronic obstructive pulmonary disease) ([facility])     emphysema    Degeneration of lumbar or lumbosacral intervertebral disc     Depression     DM (diabetes mellitus) (HCC)     Emphysema of lung (HCC)     Gastritis     GERD (gastroesophageal reflux disease)     Hearing loss     Hematuria     Hiatal hernia     HTN (hypertension), benign 6/28/2014    Hypertriglyceridemia     Incontinence of urine 9/25/2013    Irritable bowel syndrome with both constipation and diarrhea 1/26/2021    Knee arthropathy     Lumbosacral spondylosis without myelopathy 9/29/2014    Migraine headache     DR. BASIL [place]     Neuropathy     Obesity     On home oxygen therapy     2 L PER NC  HS AND PRN    HIGINIO on CPAP     Otitis media 1-26-15    Secondary diabetes mellitus with stage 3 chronic kidney disease (GFR 30-59) ([facility])     Stroke ([facility] [address]      mini stroke.  Tinnitus     Type 2 diabetes mellitus without complication ([facility])     Type II or unspecified type diabetes mellitus without mention of complication, not stated as uncontrolled     UTI (lower urinary tract infection)     Varicose vein        Past Surgical History:   Procedure Laterality Date    ABLATION OF DYSRHYTHMIC FOCUS  2000    CARPAL TUNNEL RELEASE Right     CATARACT REMOVAL WITH IMPLANT Bilateral     CHOLECYSTECTOMY  2007    COLONOSCOPY      COLONOSCOPY  04/10/2017    tubular adenomo polyp , mod. sigmoid diverticulosis, min. int. hemorrhoids    COLONOSCOPY N/A 3/2/2021    COLONOSCOPY WITH BIOPSY performed by Abdoul Jha MD at [place] [address]  9/25/2013    DILATION AND CURETTAGE  1979    EYE SURGERY      laser    INCONTINENCE SURGERY      Percutaneous nerve stimulation Dr Gerardo Nath Nov 2013     INSERTABLE CARDIAC MONITOR  12/04/2015    [employer] REVEAL LINQ MODEL #[id number] MRI CONDTIONAL OK 3T.  IMMEDIATELY POST IMPLANT    OTHER SURGICAL HISTORY  09/10/15    removal of interstim    NH COLSC FLX W/REMOVAL LESION BY HOT BX FORCEPS N/A 4/10/2017    COLONOSCOPY POLYPECTOMY HOT BIOPSY performed by Jeanetta Pallas, MD at [address] [place] [address] TONSILLECTOMY      TUBAL LIGATION      TYMPANOPLASTY      TYMPANOPLASTY      TYMPANOSTOMY TUBE PLACEMENT  08/21/2017    UPPER GASTROINTESTINAL ENDOSCOPY  03/2016    Dr Rogelio Zelaya ENDOSCOPY N/A 3/2/2021    EGD BIOPSY performed by Richard Nettles MD at [facility]       Allergies   Allergen Reactions    Robitussin [Guaifenesin] Anaphylaxis    Codeine Swelling    Compazine [Prochlorperazine Maleate] Hives and Swelling    Iodides Itching    Morphine Other (See Comments)     hallucinations    Moxifloxacin      Other reaction(s): Intolerance-unknown  Other reaction(s):  Intolerance-unknown    Reglan [Metoclopramide] Nausea Only    Avelox [Moxifloxacin Hcl In Nacl] Rash     Dermatitis      Cipro Xr Rash     dermatitis    Sulfa Antibiotics Rash         Current Outpatient Medications:     citalopram (CELEXA) 20 MG tablet, TAKE 1 TABLET BY MOUTH DAILY NEEDS TO DECREASE THE CELEXA TO 20 MG DUE TO SIDE EFFECTS ON THE HEART, Disp: 30 tablet, Rfl: 5    aspirin 81 MG EC tablet, TAKE 1 TABLET BY MOUTH ONCE DAILY, Disp: 90 tablet, Rfl: 1    pantoprazole (PROTONIX) 40 MG tablet, TAKE 1 TABLET BY MOUTH TWICE A DAY, Disp: 60 tablet, Rfl: 5    docusate sodium (COLACE) 100 MG capsule, TAKE 1 CAPSULE BY MOUTH TWICE A DAY, Disp: 60 capsule, Rfl: 5    Multiple Vitamins-Minerals (CERTAVITE/ANTIOXIDANTS) TABS, TAKE 1 TABLET BY MOUTH ONCE DAILY, Disp: 30 tablet, Rfl: 5    isosorbide dinitrate (ISORDIL) 30 MG tablet, TAKE 1 TABLET BY MOUTH ONCE DAILY, Disp: 30 tablet, Rfl: 5    vitamin B-12 (CYANOCOBALAMIN) 100 MCG tablet, take half a tablet BY MOUTH ONCE DAILY, Disp: 30 tablet, Rfl: 0    furosemide (LASIX) 20 MG tablet, TAKE 1 TABLET BY MOUTH ONCE DAILY AS NEEDED, Disp: 30 tablet, Rfl: 0    Icosapent Ethyl (VASCEPA) 1 g CAPS capsule, TAKE 1 CAPSULE BY MOUTH TWICE A DAY, Disp: 60 capsule, Rfl: 0    dicyclomine (BENTYL) 10 MG capsule, TAKE 1 CAPSULE BY MOUTH TWICE A DAY AS NEEDED FOR ABDOMINAL SPASMS, Disp: 60 capsule, Rfl: 0    oxyCODONE-acetaminophen (PERCOCET) 5-325 MG per tablet, Take 1 tablet by mouth See Admin Instructions for 30 days. Intended supply: 30 days.  1 tab 3-4 times a day prn, Disp: 100 tablet, Rfl: 0    fenofibrate (TRIGLIDE) 160 MG tablet, Take 1 tablet by mouth daily, Disp: 90 tablet, Rfl: 1    insulin glargine (BASAGLAR KWIKPEN) 100 UNIT/ML injection pen, Inject 50 Units into the skin 2 times daily, Disp: 10 mL, Rfl: 2    topiramate (TOPAMAX) 100 MG tablet, TAKE 1 TABLET BY MOUTH NIGHTLY , Disp: 90 tablet, Rfl: 2    clopidogrel (PLAVIX) 75 MG tablet, TAKE 1 TAB BY MOUTH ONCE A DAY ( IN THE MORNING ) -THIS MEDICINE MAY BE TAKENWITH OR WITHOUT FOOD , Disp: 90 tablet, Rfl: 1    metFORMIN (GLUCOPHAGE) 1000 MG tablet, TAKE 1 TAB BY MOUTH TWICE A DAY ( IN THE MORNING AND BEDTIME ) , Disp: 180 tablet, Rfl: 3    blood glucose test strips (ONETOUCH ULTRA) strip, TEST TWO (2) TO THREE (3) TIMES A DAY & AS NEEDED FOR SYMPTOMS OF IRREGULAR BLOOD GLUCOSE, Disp: 100 strip, Rfl: 0    blood glucose test strips (ONETOUCH ULTRA) strip, TEST TWO (2) TO THREE (3) TIMES A DAY & AS NEEDED FOR SYMPTOMS OF IRREGULAR BLOOD GLUCOSE, Disp: 100 strip, Rfl: 0    Blood Glucose Monitoring Suppl (ONE TOUCH ULTRA 2) w/Device KIT, , Disp: , Rfl:     ONETOUCH ULTRA strip, TEST TWO (2) TO THREE (3) TIMES A DAY& AS NEEDED , Disp: 100 each, Rfl: 0    insulin aspart (NOVOLOG FLEXPEN) 100 UNIT/ML injection pen, IF <139 - NO INSULIN; 140-199-2 UNITS;200-249-4 UNITS;250-299-6 UNITS;300-349-8 UNITS;350-400=10 UNITS;ABOVE 400-12 UNITS, Disp: 15 mL, Rfl: 3    atorvastatin (LIPITOR) 40 MG tablet, Take 1 tablet by mouth daily, Disp: 90 tablet, Rfl: 3    BiPAP Machine MISC, repair/replace Bipap maintain 18/9CMH2O, Cflex=3,mask,tubing,filters,headgear,heated humidifier,all supplies PRN, Disp: , Rfl:     Psyllium (METAMUCIL PO), Take by mouth 3 times daily Takes powder, Disp: , Rfl:     blood glucose test strips (TRUE METRIX BLOOD GLUCOSE TEST) strip, THREE TIMES A DAY, Disp: 100 each, Rfl: 3    ferrous sulfate (IRON 325) 325 (65 Fe) MG tablet, TAKE 1 TAB BY MOUTH EVERY MORNING , Disp: 90 tablet, Rfl: 5    carvedilol (COREG) 3.125 MG tablet, TAKE 1 TAB BY MOUTH TWICE A DAY ( IN THE MORNING AND BEDTIME ) , Disp: 180 tablet, Rfl: 3    losartan (COZAAR) 25 MG tablet, TAKE 1 TAB BY MOUTH ONCE A DAY ( IN THE MORNING ), Disp: 90 tablet, Rfl: 5    magnesium oxide (MAG-OX) 400 MG tablet, TAKE 1 TAB BY MOUTH TWICE A DAY ( IN THE MORNING AND BEDTIME ), Disp: 180 tablet, Rfl: 3    albuterol (PROVENTIL) (2.5 MG/3ML) 0.083% nebulizer solution, Take 3 mLs by nebulization every 6 hours as needed for Wheezing, Disp: 120 each, Rfl: 3    ULTICARE MINI PEN NEEDLES 31G X 6 MM MISC, USE AS DIRECTED , Disp: 100 each, Rfl: 5    gabapentin (NEURONTIN) 300 MG capsule, Take 1 capsule by mouth 3 times daily for 30 days. (Patient taking differently: Take 300 mg by mouth 2 times daily. ), Disp: 90 capsule, Rfl: 2    nystatin (MYCOSTATIN) 962401 UNIT/GM powder, Apply 3 times daily. , Disp: 3 Bottle, Rfl: 3    lidocaine (LMX) 4 % cream, Apply topically every 8 hrs as needed for pain, Disp: 45 g, Rfl: 3    Lift Chair MISC, by Does not apply route, Disp: 1 each, Rfl: 0    Misc.  Devices (ADJUST BATH/SHOWER SEAT/BACK) MISC, Use daily for shower, Disp: 1 each, Rfl: 0    Alcohol Swabs (B-D SINGLE USE SWABS REGULAR) PADS, THREE TIMES A DAY, Disp: 100 each, Rfl: 0    nitroGLYCERIN (NITROSTAT) 0.4 MG SL tablet, 1 under the tongue as needed for angina, may repeat q5mins for up three doses, Disp: , Rfl:     Blood Glucose Monitoring Suppl HARMEET, Use daily to check BS, Disp: 1 Device, Rfl: 0    ipratropium-albuterol (DUONEB) 0.5-2.5 (3) MG/3ML SOLN nebulizer solution, Inhale 3 mLs into the lungs every 4 hours, Disp: 360 mL, Rfl: 2    Melatonin 10 MG TABS, Take 10 mg by mouth nightly, Disp: 90 tablet, Rfl: 3    Compression Stockings MISC, by Does not apply route Pressure between 20 - 25 ., Disp: 1 each, Rfl: 0    SYMBICORT 160-4.5 MCG/ACT AERO,  2 puffs As needed for sob, Disp: , Rfl:     OXYGEN, Inhale 3 L into the lungs , Disp: , Rfl:     Family History   Problem Relation Age of Onset    Diabetes Mother     Heart Disease Mother     Stomach Cancer Father     Diabetes Maternal Grandmother         also aunts and uncles (maternal)    Emphysema Sister        Social History     Socioeconomic History    Marital status: Legally      Spouse name: Not on file    Number of children: Not on file    Years of education: Not on file    Highest education level: Not on file   Occupational History    Occupation: disabled     Employer: N/A   Tobacco Use    Smoking status: Former Smoker     Packs/day: 3.00     Years: 41.00     Pack years: 123.00     Types: Cigarettes     Quit date: 2000     Years since quittin.9    Smokeless tobacco: Never Used    Tobacco comment: quit in    Vaping Use    Vaping Use: Never used   Substance and Sexual Activity    Alcohol use: No     Alcohol/week: 0.0 standard drinks    Drug use: No    Sexual activity: Not Currently   Other Topics Concern    Not on file   Social History Narrative    Not on file     Social Determinants of Health     Financial Resource Strain:     Difficulty of Paying Living Expenses: Not on file   Food Insecurity:     Worried About Running Out of Food in the Last Year: Not on file    Nadir of Food in the Last Year: Not on file   Transportation Needs:     Lack of Transportation (Medical): Not on file    Lack of Transportation (Non-Medical):  Not on file   Physical Activity:     Days of Exercise per Week: Not on file    Minutes of Exercise per Session: Not on file   Stress:     Feeling of Stress : Not on file   Social Connections:     Frequency of Communication with Friends and Family: Not on file    Frequency of Social Gatherings with Friends and Family: Not on file    Attends Rastafari Services: Not on file    Active Member of Clubs or Organizations: Not on file    Attends Club or Organization Meetings: Not on file    Marital Status: Not on file   Intimate Partner Violence:     Fear of Current or Ex-Partner: Not on file    Emotionally Abused: Not on file    Physically Abused: Not on file    Sexually Abused: Not on file   Housing Stability:     Unable to Pay for Housing in the Last Year: Not on file    Number of [place] in the Last Year: Not on file    Unstable Housing in the Last Year: Not on file         Review of Systems:  Review of Systems   Constitutional: Negative. HENT: Positive for hearing loss. Hearing aids   Cardiovascular: Positive for chest pain. Had to take NTG  A week ago   Respiratory:        Uses cpap   Endocrine:        Diabetic;   Hematologic/Lymphatic: Bruises/bleeds easily. Skin: Negative. Musculoskeletal: Positive for arthritis, back pain and joint pain. Knees   Gastrointestinal: Negative. Genitourinary: Positive for nocturia. Neurological: Positive for numbness. Uses a walker   Psychiatric/Behavioral: Negative. Physical Exam:  LMP  (LMP Unknown)     Physical Exam  Skin:         Neurological:      Mental Status: She is alert and oriented to person, place, and time. Psychiatric:         Mood and Affect: Mood normal.         Thought Content:  Thought content normal.           Assessment:      Problem List Items Addressed This Visit     Type 2 diabetes mellitus with diabetic polyneuropathy, with long-term current use of insulin ([facility]) (Chronic)    Spondylarthrosis    Polyneuropathy, unspecified    Osteoarthritis of cervical spine (Chronic)    Obesity, Class III, BMI 40-49.9 (morbid obesity) ([facility])    Medication monitoring encounter (Chronic)    Lumbosacral spondylosis without myelopathy (Chronic)    Lumbar radiculopathy, chronic    DDD (degenerative disc disease), lumbar    DDD (degenerative disc disease), cervical (Chronic)    Chronic, continuous use of opioids - Primary    Bilateral low back pain with sciatica          Treatment Plan:  DISCUSSION: Treatment options discussed withpatient and all questions answered to patient's satisfaction. Possible side effects, risk of tolerance and or dependence and alternative treatments discussed    Obtaining appropriate analgesic effect of treatment   No signs of potential drug abuse or diversion identified    [x] Ill effects of being on chronic pain medications such as sleep disturbances, respiratory depression, hormonal changes, withdrawal symptoms, chronic opioid dependence and tolerance as well as risk of taking opioids with Benzodiazepines and taking opioids along with alcohol,  werediscussed with patient. I had asked the patient to minimize medication use and utilize pain medications only for uncontrolled rest pain or pain with exertional activities. I advised patient not to self-escalate painmedications without consulting with us. At each of patient's future visits we will try to taper pain medications, while adjusting the adjunct medications, and re-evaluating for Physical Therapy to improve spinal andjoint strength. We will continue to have discussions to decrease pain medications as tolerated. Counseled patient on effects their pain medication and /or their medical condition mayhave on their  ability to drive or operate machinery. Instructed not to drive or operate machinery if drowsy     I also discussed with the patient regarding the dangers of combining narcotic pain medication with tranquilizers, alcohol or illegal drugs or taking the medication any way other than prescribed. The dangers were discussed  including respiratory depression and death. Patient was told to tell  all  physicians regarding the medications he is getting from pain clinic. Patient is warned not to take any unprescribed medications over-the-countermedications that can depress breathing . Patient is advised to talk to the pharmacist or physicians if planning to take any over-the-counter medications before  takeing them. Patient is strongly advised to avoid tranquilizers or  relaxants, illegal drugs  or any medications that can depress breathing  Patient is also advised to tell us if there is any changes in their medications from other physicians.             TREATMENT OPTIONS:       Medication Agreement Requirements Met  Continue Opioid therapy  Script written for  percocet  Follow up appointment made

## 2021-12-29 ENCOUNTER — VIRTUAL VISIT (OUTPATIENT)
Dept: FAMILY MEDICINE CLINIC | Age: 71
End: 2021-12-29
Payer: COMMERCIAL

## 2021-12-29 DIAGNOSIS — B37.2 CANDIDAL INTERTRIGO: ICD-10-CM

## 2021-12-29 DIAGNOSIS — N30.00 ACUTE CYSTITIS WITHOUT HEMATURIA: Primary | ICD-10-CM

## 2021-12-29 DIAGNOSIS — N39.3 STRESS INCONTINENCE: ICD-10-CM

## 2021-12-29 PROCEDURE — 99443 PR PHYS/QHP TELEPHONE EVALUATION 21-30 MIN: CPT | Performed by: FAMILY MEDICINE

## 2021-12-29 RX ORDER — OXYBUTYNIN CHLORIDE 5 MG/1
5 TABLET, EXTENDED RELEASE ORAL DAILY
Qty: 30 TABLET | Refills: 3 | Status: SHIPPED | OUTPATIENT
Start: 2021-12-29 | End: 2022-03-30

## 2021-12-29 RX ORDER — KETOCONAZOLE 20 MG/G
CREAM TOPICAL
Qty: 1 EACH | Refills: 0 | Status: SHIPPED | OUTPATIENT
Start: 2021-12-29 | End: 2022-07-25

## 2021-12-29 NOTE — PROGRESS NOTES
Enrico Vogt is a 66 y.o. female evaluated via telephone on 12/29/2021. Chief Complaint   Patient presents with    Urinary Tract Infection       Consent:  She and/or health care decision maker is aware that that she may receive a bill for this telephone service, depending on her insurance coverage, and has provided verbal consent to proceed: Yes      Documentation:  I communicated with the patient and/or health care decision maker about     Patient is scheduled for sick call complaining of UTI symptoms increased frequency irritation and itching and genital area. Patient reports she has stress incontinence and uses pads. Due to that she has vaginal itching and feels suprapubic pressure. She denies any fever chills. She denies any nausea vomiting or any flank pain. Patient is not taking anything for stress incontinence. Details of this discussion including any medical advice provided:     1. Acute cystitis without hematuria  We will check UA antibiotics after that. - Urinalysis Reflex to Culture; Future    2. Stress incontinence  Start on oxybutynin 5 mg. Continue using incontinence pads  - oxybutynin (DITROPAN XL) 5 MG extended release tablet; Take 1 tablet by mouth daily  Dispense: 30 tablet; Refill: 3    3. Candidal intertrigo  Ketoconazole keep genital area dry. - ketoconazole (NIZORAL) 2 % cream; Apply topically  2 times a day. Dispense: 1 each; Refill: 0          I affirm this is a Patient Initiated Episode with a Patient who has not had a related appointment within my department in the past 7 days or scheduled within the next 24 hours. Patient identification was verified at the start of the visit: Yes    Total Time: minutes: 21-30 minutes    The visit was conducted pursuant to the emergency declaration under the [facility], [address] [place] [address] authority and the [employer] General Act.   Patient identification was verified, and a caregiver was present when appropriate. The patient was located in a state where the provider was credentialed to provide care.     Note: not billable if this call serves to triage the patient into an appointment for the relevant concern      Rancho Quiroz MD

## 2021-12-31 ENCOUNTER — HOSPITAL ENCOUNTER (OUTPATIENT)
Age: 71
Discharge: HOME | End: 2021-12-31
Payer: COMMERCIAL

## 2021-12-31 DIAGNOSIS — N30.00 ACUTE CYSTITIS WITHOUT HEMATURIA: ICD-10-CM

## 2021-12-31 LAB
-: ABNORMAL
AMORPHOUS: ABNORMAL
BACTERIA: ABNORMAL
BILIRUBIN URINE: NEGATIVE
CASTS UA: ABNORMAL /LPF
COLOR: YELLOW
COMMENT UA: ABNORMAL
CRYSTALS, UA: ABNORMAL /HPF
EPITHELIAL CELLS UA: ABNORMAL /HPF
GLUCOSE URINE: ABNORMAL
KETONES, URINE: NEGATIVE
LEUKOCYTE ESTERASE, URINE: ABNORMAL
MUCUS: ABNORMAL
NITRITE, URINE: NEGATIVE
OTHER OBSERVATIONS UA: ABNORMAL
PH UA: 7 (ref 5–8)
PROTEIN UA: NEGATIVE
RBC UA: ABNORMAL /HPF
RENAL EPITHELIAL, UA: ABNORMAL /HPF
SPECIFIC GRAVITY UA: 1.03 (ref 1–1.03)
TRICHOMONAS: ABNORMAL
TURBIDITY: ABNORMAL
URINE HGB: NEGATIVE
UROBILINOGEN, URINE: NORMAL
WBC UA: ABNORMAL /HPF
YEAST: ABNORMAL

## 2021-12-31 PROCEDURE — 87186 SC STD MICRODIL/AGAR DIL: CPT

## 2021-12-31 PROCEDURE — 87077 CULTURE AEROBIC IDENTIFY: CPT

## 2021-12-31 PROCEDURE — 87086 URINE CULTURE/COLONY COUNT: CPT

## 2021-12-31 PROCEDURE — 81001 URINALYSIS AUTO W/SCOPE: CPT

## 2022-01-03 LAB
CULTURE: ABNORMAL
CULTURE: ABNORMAL
Lab: ABNORMAL
SPECIMEN DESCRIPTION: ABNORMAL

## 2022-01-03 ASSESSMENT — ENCOUNTER SYMPTOMS
CONSTIPATION: 0
EYE REDNESS: 0
ABDOMINAL PAIN: 0
PHOTOPHOBIA: 0
COUGH: 0
SHORTNESS OF BREATH: 1
APNEA: 0
BACK PAIN: 1
EYE PAIN: 0
SORE THROAT: 0
GASTROINTESTINAL NEGATIVE: 1

## 2022-01-03 NOTE — PROGRESS NOTES
Nata Winn is a 66 y.o. female evaluated on 1/4/2022. Modality of virtual service provided -via  telephone   Consent:  Patient and/or health care decision maker is aware that that patient may receive a bill for this telephone service, depending on one's insurance coverage, and has provided verbal consent to proceed: Yes    Patient identification was verified at the start of the visit: Yes    Chief complaint: Nata Winn is 66 y.o.,  female, with  with chief complaint of pain involving low back. Mundo Mckay is patient is complaining of pain involving the low back as well as in the cervical region. Patient reports her neck and the back pain fluctuates considerably from day today. Her neck feels stiff. Overall her pain is unchanged from the previous visit. Patient's back pain is somewhat worse compared to the neck pain. Her neck pain is under better control at this time. Back Pain  This is a chronic problem. The current episode started more than 1 year ago. The problem occurs constantly. The problem is unchanged (Slightly worse than the past). The pain is present in the lumbar spine and sacro-iliac. The quality of the pain is described as aching (sharp). The pain radiates to the left thigh, right foot and right thigh. The pain is at a severity of 8/10 (7-9). The pain is severe. The pain is worse during the day. The symptoms are aggravated by bending, standing and twisting (ADLs, Walking, Lifting). Associated symptoms include chest pain and weakness. Pertinent negatives include no abdominal pain, dysuria, numbness or tingling. (Both legs) Risk factors include obesity, lack of exercise and sedentary lifestyle. Alleviating factors:heat and ice   Lifestyle changes experienced with pain: Prevents or limits ADLs, Increases w/activity. , Increases w/prolonged sitting/standing/walking  Mood changes,none  Patient currently unemployed.   Physical therapy did not help the pain.    Are you under psychological counseling at present: No  Goals for treatment include:  Decrease in pain  Enjoy daily and recreational activities, return to previous status. Patient relates current medications are helping the pain. Patient reports taking pain medications as prescribed, denies obtaining medications from different sources and denies use of illegal drugs. Patient denies side effects from medications like nausea, vomiting, constipation or drowsiness. Patient reports current activities of daily living ar possible due to medications and would like to continue them. ACTIVITY/SOCIAL/EMOTIONAL:  Sleep Pattern: 8 hours per night. generally restful sleep uses CPAP mechine  Home Exercises:  walking  Activity:unchanged  Emotional Issues: normal.   Currently seeing a Psychiatrist or Psychologist:  No     ADVERSE MEDICATION EFFECTS:   Nausea and vomiting: no   Constipation: no-Undercontrol-: yes  Dizziness/drowsy/sleepy--no  Urinary Retention: no    ABERRANT BEHAVIORS SINCE LAST VISIT  Lost rx/pills:------------------------------------------ no  Taking  medication as prescribed: ----------- yes  Urine Drug Screen ---------------------------------  yes             Date------------------------------------------------2/26/21              Results as expected ---------------------yes    Recent ER visits: -------------------------------------No  Pill count is appropriate: ---------------------------yes   Refills for prescriptions appropriate:---------- no      Past Medical History:   Diagnosis Date    Abdominal bloating 1/26/2021    Adenomatous polyp of ascending colon 1/26/2021    Allergic rhinitis     Anxiety 7/17/2013    Arthritis     Asthma     Atrial fibrillation (HCC)     Back pain     NERVE/DR. GRACIA    Benign hypertension with CKD (chronic kidney disease) stage III (HCC)     CAD (coronary artery disease) 3/21/2013    Caffeine use     2 coffee/day    CHF (congestive heart failure) ([facility]     Chronic kidney disease     CKD (chronic kidney disease) stage 3, GFR 30-59 ml/min ([facility])     COPD (chronic obstructive pulmonary disease) ([facility])     emphysema    Degeneration of lumbar or lumbosacral intervertebral disc     Depression     DM (diabetes mellitus) ([facility])     Emphysema of lung ([facility])     Gastritis     GERD (gastroesophageal reflux disease)     Hearing loss     Hematuria     Hiatal hernia     HTN (hypertension), benign 6/28/2014    Hypertriglyceridemia     Incontinence of urine 9/25/2013    Irritable bowel syndrome with both constipation and diarrhea 1/26/2021    Knee arthropathy     Lumbosacral spondylosis without myelopathy 9/29/2014    Migraine headache     DR. BASIL [place]     Neuropathy     Obesity     On home oxygen therapy     2 L PER NC  HS AND PRN    HIGINIO on CPAP     Otitis media 1-26-15    Secondary diabetes mellitus with stage 3 chronic kidney disease (GFR 30-59) ([facility])     Stroke ([facility]      mini stroke.  Tinnitus     Type 2 diabetes mellitus without complication ([facility])     Type II or unspecified type diabetes mellitus without mention of complication, not stated as uncontrolled     UTI (lower urinary tract infection)     Varicose vein        Past Surgical History:   Procedure Laterality Date    ABLATION OF DYSRHYTHMIC FOCUS  2000    CARPAL TUNNEL RELEASE Right     CATARACT REMOVAL WITH IMPLANT Bilateral     CHOLECYSTECTOMY  2007    COLONOSCOPY      COLONOSCOPY  04/10/2017    tubular adenomo polyp , mod. sigmoid diverticulosis, min. int. hemorrhoids    COLONOSCOPY N/A 3/2/2021    COLONOSCOPY WITH BIOPSY performed by Yulia Weinstein MD at [place] [address]  9/25/2013    DILATION AND CURETTAGE  1979    EYE SURGERY      laser    INCONTINENCE SURGERY      Percutaneous nerve stimulation Dr Mark Amaya 2013     INSERTABLE CARDIAC MONITOR  12/04/2015    MONOCOTRONIC REVEAL LINQ MODEL #[id number] MRI CONDTIONAL OK 3T.  IMMEDIATELY POST IMPLANT    OTHER SURGICAL HISTORY  09/10/15    removal of interstim    CO COLSC FLX W/REMOVAL LESION BY HOT BX FORCEPS N/A 4/10/2017    COLONOSCOPY POLYPECTOMY HOT BIOPSY performed by Dennys Duran MD at [address] [place] [address]      TYMPANOPLASTY      TYMPANOPLASTY      TYMPANOSTOMY TUBE PLACEMENT  2017    UPPER GASTROINTESTINAL ENDOSCOPY  2016    Dr Wellington Gaspar N/A 3/2/2021    EGD BIOPSY performed by Fan Nath [facility] at [facility] History   Problem Relation Age of Onset    Diabetes Mother     Heart Disease Mother     Stomach Cancer Father     Diabetes Maternal Grandmother         also aunts and uncles (maternal)    Emphysema Sister        Social History     Socioeconomic History    Marital status: Legally      Spouse name: Not on file    Number of children: Not on file    Years of education: Not on file    Highest education level: Not on file   Occupational History    Occupation: disabled     Employer: N/A   Tobacco Use    Smoking status: Former Smoker     Packs/day: 3.00     Years: 41.00     Pack years: 123.00     Types: Cigarettes     Quit date: 2000     Years since quittin.0    Smokeless tobacco: Never Used    Tobacco comment: quit in    Vaping Use    Vaping Use: Never used   Substance and Sexual Activity    Alcohol use: No     Alcohol/week: 0.0 standard drinks    Drug use: No    Sexual activity: Not Currently   Other Topics Concern    Not on file   Social History Narrative    Not on file     Social Determinants of Health     Financial Resource Strain:     Difficulty of Paying Living Expenses: Not on file   Food Insecurity:     Worried About [address] in the Last Year: Not on file    [address] in the Last Year: Not on file   Transportation Needs:     Lack of Transportation (Medical): Not on file    Lack of Transportation (Non-Medical):  Not on file   Physical Activity:     Days of Exercise per Week: Not on file    Minutes of Exercise per Session: Not on file   Stress:     Feeling of Stress : Not on file   Social Connections:     Frequency of Communication with Friends and Family: Not on file    Frequency of Social Gatherings with Friends and Family: Not on file    Attends Mandaen Services: Not on file    Active Member of [address] [place] [address] or Organizations: Not on file    Attends Club or Organization Meetings: Not on file    Marital Status: Not on file   Intimate Partner Violence:     Fear of Current or Ex-Partner: Not on file    Emotionally Abused: Not on file    Physically Abused: Not on file    Sexually Abused: Not on file   Housing Stability:     Unable to Pay for Housing in the Last Year: Not on file    Number of [place] in the Last Year: Not on file    Unstable Housing in the Last Year: Not on file       Allergies   Allergen Reactions    Robitussin [Guaifenesin] Anaphylaxis    Codeine Swelling    Compazine [Prochlorperazine Maleate] Hives and Swelling    Iodides Itching    Morphine Other (See Comments)     hallucinations    Moxifloxacin      Other reaction(s): Intolerance-unknown  Other reaction(s): Intolerance-unknown    Reglan [Metoclopramide] Nausea Only    Avelox [Moxifloxacin Hcl In Nacl] Rash     Dermatitis      Cipro Xr Rash     dermatitis    Sulfa Antibiotics Rash       Current Outpatient Medications on File Prior to Encounter   Medication Sig Dispense Refill    ketoconazole (NIZORAL) 2 % cream Apply topically  2 times a day.  1 each 0    oxybutynin (DITROPAN XL) 5 MG extended release tablet Take 1 tablet by mouth daily 30 tablet 3    citalopram (CELEXA) 20 MG tablet TAKE 1 TABLET BY MOUTH DAILY NEEDS TO DECREASE THE CELEXA TO 20 MG DUE TO SIDE EFFECTS ON THE HEART 30 tablet 5    aspirin 81 MG EC tablet TAKE 1 TABLET BY MOUTH ONCE DAILY 90 tablet 1    pantoprazole (PROTONIX) 40 MG tablet TAKE 1 TABLET BY MOUTH TWICE A DAY 60 tablet 5    docusate sodium (COLACE) 100 MG capsule TAKE 1 CAPSULE BY MOUTH TWICE A DAY 60 capsule 5    Multiple Vitamins-Minerals (CERTAVITE/ANTIOXIDANTS) TABS TAKE 1 TABLET BY MOUTH ONCE DAILY 30 tablet 5    isosorbide dinitrate (ISORDIL) 30 MG tablet TAKE 1 TABLET BY MOUTH ONCE DAILY 30 tablet 5    vitamin B-12 (CYANOCOBALAMIN) 100 MCG tablet take half a tablet BY MOUTH ONCE DAILY 30 tablet 0    furosemide (LASIX) 20 MG tablet TAKE 1 TABLET BY MOUTH ONCE DAILY AS NEEDED 30 tablet 0    Icosapent Ethyl (VASCEPA) 1 g CAPS capsule TAKE 1 CAPSULE BY MOUTH TWICE A DAY 60 capsule 0    dicyclomine (BENTYL) 10 MG capsule TAKE 1 CAPSULE BY MOUTH TWICE A DAY AS NEEDED FOR ABDOMINAL SPASMS 60 capsule 0    fenofibrate (TRIGLIDE) 160 MG tablet Take 1 tablet by mouth daily 90 tablet 1    insulin glargine (BASAGLAR KWIKPEN) 100 UNIT/ML injection pen Inject 50 Units into the skin 2 times daily 10 mL 2    topiramate (TOPAMAX) 100 MG tablet TAKE 1 TABLET BY MOUTH NIGHTLY  90 tablet 2    clopidogrel (PLAVIX) 75 MG tablet TAKE 1 TAB BY MOUTH ONCE A DAY ( IN THE MORNING ) -THIS MEDICINE MAY BE TAKENWITH OR WITHOUT FOOD  90 tablet 1    metFORMIN (GLUCOPHAGE) 1000 MG tablet TAKE 1 TAB BY MOUTH TWICE A DAY ( IN THE MORNING AND BEDTIME )  180 tablet 3    blood glucose test strips (ONETOUCH ULTRA) strip TEST TWO (2) TO THREE (3) TIMES A DAY & AS NEEDED FOR SYMPTOMS OF IRREGULAR BLOOD GLUCOSE 100 strip 0    blood glucose test strips (ONETOUCH ULTRA) strip TEST TWO (2) TO THREE (3) TIMES A DAY & AS NEEDED FOR SYMPTOMS OF IRREGULAR BLOOD GLUCOSE 100 strip 0    [DISCONTINUED] ibuprofen (ADVIL;MOTRIN) 800 MG tablet Take 1 tablet by mouth every 8 hours as needed for Pain 30 tablet 0    Blood Glucose Monitoring Suppl (ONE TOUCH ULTRA 2) w/Device KIT       ONETOUCH ULTRA strip TEST TWO (2) TO THREE (3) TIMES A DAY& AS NEEDED  100 each 0    insulin aspart (NOVOLOG FLEXPEN) 100 UNIT/ML injection pen IF <139 - NO INSULIN; 140-199-2 UNITS;200-249-4 UNITS;250-299-6 UNITS;300-349-8 UNITS;350-400=10 UNITS;ABOVE 400-12 UNITS 15 mL 3    atorvastatin (LIPITOR) 40 MG tablet Take 1 tablet by mouth daily 90 tablet 3    BiPAP Machine MISC repair/replace Bipap maintain 18/9CMH2O, Cflex=3,mask,tubing,filters,headgear,heated humidifier,all supplies PRN      Psyllium (METAMUCIL PO) Take by mouth 3 times daily Takes powder      blood glucose test strips (TRUE METRIX BLOOD GLUCOSE TEST) strip THREE TIMES A  each 3    ferrous sulfate (IRON 325) 325 (65 Fe) MG tablet TAKE 1 TAB BY MOUTH EVERY MORNING  90 tablet 5    carvedilol (COREG) 3.125 MG tablet TAKE 1 TAB BY MOUTH TWICE A DAY ( IN THE MORNING AND BEDTIME )  180 tablet 3    losartan (COZAAR) 25 MG tablet TAKE 1 TAB BY MOUTH ONCE A DAY ( IN THE MORNING ) 90 tablet 5    magnesium oxide (MAG-OX) 400 MG tablet TAKE 1 TAB BY MOUTH TWICE A DAY ( IN THE MORNING AND BEDTIME ) 180 tablet 3    albuterol (PROVENTIL) (2.5 MG/3ML) 0.083% nebulizer solution Take 3 mLs by nebulization every 6 hours as needed for Wheezing 120 each 3    ULTICARE MINI PEN NEEDLES 31G X 6 MM MISC USE AS DIRECTED  100 each 5    gabapentin (NEURONTIN) 300 MG capsule Take 1 capsule by mouth 3 times daily for 30 days. (Patient taking differently: Take 300 mg by mouth 2 times daily. ) 90 capsule 2    nystatin (MYCOSTATIN) 523234 UNIT/GM powder Apply 3 times daily. 3 Bottle 3    lidocaine (LMX) 4 % cream Apply topically every 8 hrs as needed for pain 45 g 3    Lift Chair MISC by Does not apply route 1 each 0    Misc.  Devices (ADJUST BATH/SHOWER SEAT/BACK) MISC Use daily for shower 1 each 0    Alcohol Swabs (B-D SINGLE USE SWABS REGULAR) PADS THREE TIMES A  each 0    nitroGLYCERIN (NITROSTAT) 0.4 MG SL tablet 1 under the tongue as needed for angina, may repeat q5mins for up three doses      Blood Glucose Monitoring Suppl HARMEET Use daily to check BS 1 Device 0    ipratropium-albuterol (DUONEB) 0.5-2.5 (3) MG/3ML SOLN nebulizer circumferential disc bulge. There is a right paracentral disc extrusion which extends caudally 1.2 cm and    effaces the right lateral recess affecting the traversing right L5 nerve    root. Otherwise mild neural foraminal narrowing. No central canal stenosis. Moderate facet arthropathy. L5-S1: Minimal degenerative loss of disc space height and signal.  No focal    disc protrusion. Minimal neural foraminal narrowing. Mild-to-moderate facet    arthropathy. Impression    Right paracentral disc extrusion L4-5 narrowing the right lateral recess. Please correlate with possible right L5 radiculopathy. This is new from    previous MRI. Otherwise mild degenerate change. No evidence of discitis osteomyelitis or epidural abscess. Clinical  impression:  1. Morbid obesity with BMI of 40.0-44.9, adult ([address]    2. DDD (degenerative disc disease), cervical    3. Lumbosacral spondylosis without myelopathy    4. Lumbar radiculopathy, chronic    5. Sacroiliitis, not elsewhere classified ([address]    6. Type 2 diabetes mellitus with diabetic polyneuropathy, with long-term current use of insulin ([address]    7. Medication monitoring encounter        Plan of care: We will continue current pain medications  Current medications are being tolerated without any Adverse side effects. Orders Placed This Encounter   Medications    oxyCODONE-acetaminophen (PERCOCET) 5-325 MG per tablet     Sig: Take 1 tablet by mouth See Admin Instructions for 30 days. Intended supply: 30 days. 1 tab 3-4 times a day prn     Dispense:  100 tablet     Refill:  0     Reduce doses taken as pain becomes manageable     Urine drug screens have been appropriate. No aberrant activity noted. Analgesia is achieved. Activities of daily living are possible because of medications. Safe use of medications explained to patient.     PDMP Monitoring:    Last PDMP Renee Zaldivar as Reviewed [facility]):  Review User Review Instant Review Result Lourdes Harry 1/3/2022  8:29 AM Reviewed PDMP [1]     Counselling/Preventive measures for pain  Control:    [x]  Spine strengthening exercises are discussed with patient in detail. [x] Ill effects of being on chronic pain medications such as sleep disturbances, hormonal changes, withdrawal symptoms,  chronic opioid dependence and tolerance were discussed with patient. I had asked the patient to minimize medication use and utilize pain medications only for uncontrolled rest pain or pain with exertional activities. I advised patient not to self escalate pain medications without consulting with us. At each of patient's future visits we will try to taper pain medications, while adjusting the adjunct medications, and re-evaluating for Physical Therapy to improve spinal and joint strength. We will continue to have discussions to decrease pain medications as tolerated. I also discussed with the patient regarding the dangers of combining narcotic pain medication with tranquilizers, alcohol or illegal drugs or taking the medication any other than prescribed. The dangers including the respiratory depression and death. Patient was told to tell  to all  physicians regarding the medications he is getting from pain clinic. Patient is warned not to take any unprescribed medications over-the-counter medications that can depress breathing . Patient is advised to talk to the pharmacist or physicians if planning to take any over-the-counter medications before  takeing them. Patient is strongly advised to avoid tranquilizers or  Relaxants for any medications that can depress breathing or recreational drugs. Patient is also advised to tell us if there is any changes in his medications from other physicians. We discussed the same at today's visit and have not been to implement it, as the patient's pain is not under control with current medications.      Decision Making Process : Patient's health history and referral records thoroughly reviewed before focused physical examination and discussion with patient. Over 50% of today's visit is spent on examining the patient and counseling and coordinating the care. Level of complexity of date to be reviewed is Moderate. The chart date reviewed include the following: Imaging Reports. Summary of Care. Time spent reviewing with patient the below reports:   Medication safety, Treatment options. Level of diagnosis and management options of this case is multiple: involving the following management options: Interventions as needed, medication management as appropriate, future visits, activity modification, heat/ice as needed, Urine drug screen as required. [x]The patient's questions were answered to the best of my abilities. This note was created using voice recognition software. There may be inaccuracies of transcription  that are inadvertently overlooked prior to the signature. There is any questions about the transcription please contact me. Return in  4 weeks  with physician / CNP  for further plan of treatment. Due to the COVID-19 pandemic and the appropriate interventions by Luis Felipe Shelton, our non-urgent pain management patients will not be seen in the office at this time for their protection and the protection of our staff. To offer continuity of care, their prescriptions will be escribed this month after a careful chart review and review of their OARRS report  Pursuant to the emergency declaration under the [employer] and [facility], [facility]5 waiver authority and the [employer] and [employer] Act, this Virtual Visit was conducted, with patient's consent, to reduce the patient's risk of exposure to COVID-19 and provide continuity of care for an established patient.       Services were provided through a video synchronous discussion virtually to substitute for in-person appointment. \"  Documentation:  I communicated with the patient and/or health care decision maker about plan of care  Details of this discussion including any medical advice provided: Total Time: minutes: 21-30 minutes    I affirm this is a Patient Initiated Episode with an Established Patient who has not had a related appointment within my department in the past 7 days or scheduled within the next 24 hours.     Electronically signed by Siva Khalil MD on 1/11/2022 at 6:58 AM

## 2022-01-04 ENCOUNTER — HOSPITAL ENCOUNTER (OUTPATIENT)
Dept: PAIN MANAGEMENT | Age: 72
Discharge: HOME | End: 2022-01-04
Payer: MEDICARE

## 2022-01-04 DIAGNOSIS — M54.16 LUMBAR RADICULOPATHY, CHRONIC: ICD-10-CM

## 2022-01-04 DIAGNOSIS — M50.30 DDD (DEGENERATIVE DISC DISEASE), CERVICAL: Chronic | ICD-10-CM

## 2022-01-04 DIAGNOSIS — Z79.4 TYPE 2 DIABETES MELLITUS WITH DIABETIC POLYNEUROPATHY, WITH LONG-TERM CURRENT USE OF INSULIN (HCC): ICD-10-CM

## 2022-01-04 DIAGNOSIS — E66.01 MORBID OBESITY WITH BMI OF 40.0-44.9, ADULT (HCC): Primary | ICD-10-CM

## 2022-01-04 DIAGNOSIS — Z51.81 MEDICATION MONITORING ENCOUNTER: ICD-10-CM

## 2022-01-04 DIAGNOSIS — M46.1 SACROILIITIS, NOT ELSEWHERE CLASSIFIED (HCC): Chronic | ICD-10-CM

## 2022-01-04 DIAGNOSIS — B95.2 ENTEROCOCCUS UTI: Primary | ICD-10-CM

## 2022-01-04 DIAGNOSIS — E11.42 TYPE 2 DIABETES MELLITUS WITH DIABETIC POLYNEUROPATHY, WITH LONG-TERM CURRENT USE OF INSULIN (HCC): ICD-10-CM

## 2022-01-04 DIAGNOSIS — B37.49 CANDIDA UTI: ICD-10-CM

## 2022-01-04 DIAGNOSIS — N39.0 ENTEROCOCCUS UTI: Primary | ICD-10-CM

## 2022-01-04 DIAGNOSIS — M47.817 LUMBOSACRAL SPONDYLOSIS WITHOUT MYELOPATHY: Chronic | ICD-10-CM

## 2022-01-04 PROCEDURE — 99213 OFFICE O/P EST LOW 20 MIN: CPT

## 2022-01-04 PROCEDURE — 99214 OFFICE O/P EST MOD 30 MIN: CPT | Performed by: PAIN MEDICINE

## 2022-01-04 RX ORDER — AMPICILLIN 500 MG/1
500 CAPSULE ORAL 2 TIMES DAILY
Qty: 20 CAPSULE | Refills: 0 | Status: SHIPPED | OUTPATIENT
Start: 2022-01-04 | End: 2022-01-14

## 2022-01-04 RX ORDER — FLUCONAZOLE 150 MG/1
150 TABLET ORAL DAILY
Qty: 3 TABLET | Refills: 0 | Status: SHIPPED | OUTPATIENT
Start: 2022-01-04 | End: 2022-01-07

## 2022-01-04 RX ORDER — OXYCODONE HYDROCHLORIDE AND ACETAMINOPHEN 5; 325 MG/1; MG/1
1 TABLET ORAL SEE ADMIN INSTRUCTIONS
Qty: 100 TABLET | Refills: 0 | Status: SHIPPED | OUTPATIENT
Start: 2022-01-10 | End: 2022-02-07 | Stop reason: SDUPTHER

## 2022-01-21 DIAGNOSIS — I10 HTN (HYPERTENSION), BENIGN: Chronic | ICD-10-CM

## 2022-01-21 RX ORDER — CARVEDILOL 3.12 MG/1
TABLET ORAL
Qty: 180 TABLET | Refills: 3 | Status: SHIPPED | OUTPATIENT
Start: 2022-01-21

## 2022-01-21 NOTE — TELEPHONE ENCOUNTER
Please Approve or Refuse.   Send to Pharmacy per Pt's Request:     Next Visit Date:  3/1/2022   Last Visit Date: 12/29/2021    Hemoglobin A1C (%)   Date Value   02/25/2021 7.8   09/14/2020 10.6   02/12/2020 8.4             ( goal A1C is < 7)   BP Readings from Last 3 Encounters:   10/09/21 (!) 116/56   05/26/21 (!) 110/90   04/30/21 120/76          (goal 120/80)  BUN   Date Value Ref Range Status   10/13/2021 23 8 - 23 mg/dL Final     CREATININE   Date Value Ref Range Status   10/13/2021 1.18 (H) 0.50 - 0.90 mg/dL Final     Potassium   Date Value Ref Range Status   10/13/2021 4.4 3.7 - 5.3 mmol/L Final

## 2022-02-01 ENCOUNTER — HOSPITAL ENCOUNTER (EMERGENCY)
Age: 72
Discharge: HOME | End: 2022-02-01
Attending: EMERGENCY MEDICINE
Payer: COMMERCIAL

## 2022-02-01 ENCOUNTER — APPOINTMENT (OUTPATIENT)
Dept: GENERAL RADIOLOGY | Age: 72
End: 2022-02-01
Payer: COMMERCIAL

## 2022-02-01 VITALS
OXYGEN SATURATION: 95 % | DIASTOLIC BLOOD PRESSURE: 82 MMHG | SYSTOLIC BLOOD PRESSURE: 146 MMHG | TEMPERATURE: 98 F | HEART RATE: 80 BPM | RESPIRATION RATE: 22 BRPM

## 2022-02-01 DIAGNOSIS — J44.1 COPD EXACERBATION (HCC): ICD-10-CM

## 2022-02-01 DIAGNOSIS — R07.9 CHEST PAIN, UNSPECIFIED TYPE: Primary | ICD-10-CM

## 2022-02-01 LAB
-: ABNORMAL
-: NORMAL
ABSOLUTE EOS #: 0.19 K/UL (ref 0–0.44)
ABSOLUTE IMMATURE GRANULOCYTE: 0.03 K/UL (ref 0–0.3)
ABSOLUTE LYMPH #: 1.7 K/UL (ref 1.1–3.7)
ABSOLUTE MONO #: 0.67 K/UL (ref 0.1–1.2)
AMORPHOUS: ABNORMAL
ANION GAP SERPL CALCULATED.3IONS-SCNC: 13 MMOL/L (ref 9–17)
BACTERIA: ABNORMAL
BASOPHILS # BLD: 1 % (ref 0–2)
BASOPHILS ABSOLUTE: 0.07 K/UL (ref 0–0.2)
BILIRUBIN URINE: NEGATIVE
BNP INTERPRETATION: NORMAL
BUN BLDV-MCNC: 15 MG/DL (ref 8–23)
BUN/CREAT BLD: ABNORMAL (ref 9–20)
CALCIUM SERPL-MCNC: 9.2 MG/DL (ref 8.6–10.4)
CASTS UA: ABNORMAL /LPF (ref 0–2)
CASTS UA: ABNORMAL /LPF (ref 0–2)
CHLORIDE BLD-SCNC: 97 MMOL/L (ref 98–107)
CHP ED QC CHECK: NORMAL
CO2: 24 MMOL/L (ref 20–31)
COLOR: YELLOW
CREAT SERPL-MCNC: 0.97 MG/DL (ref 0.5–0.9)
CRYSTALS, UA: ABNORMAL /HPF
D-DIMER QUANTITATIVE: 0.6 MG/L FEU
DIFFERENTIAL TYPE: ABNORMAL
EOSINOPHILS RELATIVE PERCENT: 2 % (ref 1–4)
EPITHELIAL CELLS UA: ABNORMAL /HPF (ref 0–5)
GFR AFRICAN AMERICAN: >60 ML/MIN
GFR NON-AFRICAN AMERICAN: 56 ML/MIN
GFR SERPL CREATININE-BSD FRML MDRD: ABNORMAL ML/MIN/{1.73_M2}
GFR SERPL CREATININE-BSD FRML MDRD: ABNORMAL ML/MIN/{1.73_M2}
GLUCOSE BLD-MCNC: 343 MG/DL
GLUCOSE BLD-MCNC: 343 MG/DL (ref 65–105)
GLUCOSE BLD-MCNC: 356 MG/DL (ref 70–99)
GLUCOSE BLD-MCNC: 393 MG/DL (ref 65–105)
GLUCOSE URINE: ABNORMAL
HCT VFR BLD CALC: 45.8 % (ref 36.3–47.1)
HEMOGLOBIN: 14.6 G/DL (ref 11.9–15.1)
IMMATURE GRANULOCYTES: 0 %
KETONES, URINE: NEGATIVE
LEUKOCYTE ESTERASE, URINE: NEGATIVE
LYMPHOCYTES # BLD: 21 % (ref 24–43)
MCH RBC QN AUTO: 31.1 PG (ref 25.2–33.5)
MCHC RBC AUTO-ENTMCNC: 31.9 G/DL (ref 28.4–34.8)
MCV RBC AUTO: 97.7 FL (ref 82.6–102.9)
MONOCYTES # BLD: 8 % (ref 3–12)
MUCUS: ABNORMAL
NITRITE, URINE: NEGATIVE
NRBC AUTOMATED: 0 PER 100 WBC
OTHER OBSERVATIONS UA: ABNORMAL
PDW BLD-RTO: 13.1 % (ref 11.8–14.4)
PH UA: 5.5 (ref 5–8)
PLATELET # BLD: ABNORMAL K/UL (ref 138–453)
PLATELET ESTIMATE: ABNORMAL
PLATELET, FLUORESCENCE: 95 K/UL (ref 138–453)
PLATELET, IMMATURE FRACTION: 13.3 % (ref 1.1–10.3)
PMV BLD AUTO: ABNORMAL FL (ref 8.1–13.5)
POTASSIUM SERPL-SCNC: 4.6 MMOL/L (ref 3.7–5.3)
PRO-BNP: 130 PG/ML
PROTEIN UA: NEGATIVE
RBC # BLD: 4.69 M/UL (ref 3.95–5.11)
RBC # BLD: ABNORMAL 10*6/UL
RBC UA: ABNORMAL /HPF (ref 0–2)
REASON FOR REJECTION: NORMAL
RENAL EPITHELIAL, UA: ABNORMAL /HPF
SARS-COV-2, RAPID: NOT DETECTED
SEG NEUTROPHILS: 68 % (ref 36–65)
SEGMENTED NEUTROPHILS ABSOLUTE COUNT: 5.62 K/UL (ref 1.5–8.1)
SODIUM BLD-SCNC: 134 MMOL/L (ref 135–144)
SPECIFIC GRAVITY UA: 1.03 (ref 1–1.03)
SPECIMEN DESCRIPTION: NORMAL
TRICHOMONAS: ABNORMAL
TROPONIN INTERP: NORMAL
TROPONIN INTERP: NORMAL
TROPONIN T: NORMAL NG/ML
TROPONIN T: NORMAL NG/ML
TROPONIN, HIGH SENSITIVITY: 9 NG/L (ref 0–14)
TROPONIN, HIGH SENSITIVITY: 9 NG/L (ref 0–14)
TURBIDITY: CLEAR
URINE HGB: NEGATIVE
UROBILINOGEN, URINE: NORMAL
WBC # BLD: 8.3 K/UL (ref 3.5–11.3)
WBC # BLD: ABNORMAL 10*3/UL
WBC UA: ABNORMAL /HPF (ref 0–5)
YEAST: ABNORMAL
ZZ NTE CLEAN UP: ORDERED TEST: NORMAL
ZZ NTE WITH NAME CLEAN UP: SPECIMEN SOURCE: NORMAL

## 2022-02-01 PROCEDURE — 96375 TX/PRO/DX INJ NEW DRUG ADDON: CPT

## 2022-02-01 PROCEDURE — 87635 SARS-COV-2 COVID-19 AMP PRB: CPT

## 2022-02-01 PROCEDURE — 2580000003 HC RX 258

## 2022-02-01 PROCEDURE — 6370000000 HC RX 637 (ALT 250 FOR IP)

## 2022-02-01 PROCEDURE — 85055 RETICULATED PLATELET ASSAY: CPT

## 2022-02-01 PROCEDURE — 81001 URINALYSIS AUTO W/SCOPE: CPT

## 2022-02-01 PROCEDURE — 71045 X-RAY EXAM CHEST 1 VIEW: CPT

## 2022-02-01 PROCEDURE — 85025 COMPLETE CBC W/AUTO DIFF WBC: CPT

## 2022-02-01 PROCEDURE — 96374 THER/PROPH/DIAG INJ IV PUSH: CPT

## 2022-02-01 PROCEDURE — 94664 DEMO&/EVAL PT USE INHALER: CPT

## 2022-02-01 PROCEDURE — 93005 ELECTROCARDIOGRAM TRACING: CPT

## 2022-02-01 PROCEDURE — 6360000002 HC RX W HCPCS

## 2022-02-01 PROCEDURE — 84484 ASSAY OF TROPONIN QUANT: CPT

## 2022-02-01 PROCEDURE — 99285 EMERGENCY DEPT VISIT HI MDM: CPT

## 2022-02-01 PROCEDURE — 82947 ASSAY GLUCOSE BLOOD QUANT: CPT

## 2022-02-01 PROCEDURE — 94640 AIRWAY INHALATION TREATMENT: CPT

## 2022-02-01 PROCEDURE — 85379 FIBRIN DEGRADATION QUANT: CPT

## 2022-02-01 PROCEDURE — 83880 ASSAY OF NATRIURETIC PEPTIDE: CPT

## 2022-02-01 PROCEDURE — 2700000000 HC OXYGEN THERAPY PER DAY

## 2022-02-01 PROCEDURE — 80048 BASIC METABOLIC PNL TOTAL CA: CPT

## 2022-02-01 PROCEDURE — 96361 HYDRATE IV INFUSION ADD-ON: CPT

## 2022-02-01 PROCEDURE — 96372 THER/PROPH/DIAG INJ SC/IM: CPT

## 2022-02-01 RX ORDER — PREDNISONE 1 MG/1
TABLET ORAL
Qty: 20 TABLET | Refills: 0 | Status: SHIPPED | OUTPATIENT
Start: 2022-02-01 | End: 2022-02-11

## 2022-02-01 RX ORDER — INSULIN GLARGINE 100 [IU]/ML
50 INJECTION, SOLUTION SUBCUTANEOUS ONCE
Status: COMPLETED | OUTPATIENT
Start: 2022-02-01 | End: 2022-02-01

## 2022-02-01 RX ORDER — IBUPROFEN 600 MG/1
600 TABLET ORAL 3 TIMES DAILY PRN
Qty: 30 TABLET | Refills: 0 | Status: ON HOLD | OUTPATIENT
Start: 2022-02-01 | End: 2022-05-01 | Stop reason: HOSPADM

## 2022-02-01 RX ORDER — DIPHENHYDRAMINE HYDROCHLORIDE 50 MG/ML
25 INJECTION INTRAMUSCULAR; INTRAVENOUS ONCE
Status: DISCONTINUED | OUTPATIENT
Start: 2022-02-01 | End: 2022-02-01

## 2022-02-01 RX ORDER — ACETAMINOPHEN 500 MG
1000 TABLET ORAL ONCE
Status: DISCONTINUED | OUTPATIENT
Start: 2022-02-01 | End: 2022-02-01

## 2022-02-01 RX ORDER — FENTANYL CITRATE 50 UG/ML
50 INJECTION, SOLUTION INTRAMUSCULAR; INTRAVENOUS ONCE
Status: COMPLETED | OUTPATIENT
Start: 2022-02-01 | End: 2022-02-01

## 2022-02-01 RX ORDER — IPRATROPIUM BROMIDE AND ALBUTEROL SULFATE 2.5; .5 MG/3ML; MG/3ML
1 SOLUTION RESPIRATORY (INHALATION) EVERY 4 HOURS PRN
Status: DISCONTINUED | OUTPATIENT
Start: 2022-02-01 | End: 2022-02-01 | Stop reason: HOSPADM

## 2022-02-01 RX ORDER — PREDNISONE 20 MG/1
TABLET ORAL
Qty: 20 TABLET | Refills: 0 | Status: SHIPPED | OUTPATIENT
Start: 2022-02-01 | End: 2022-02-01 | Stop reason: SDUPTHER

## 2022-02-01 RX ORDER — KETOROLAC TROMETHAMINE 30 MG/ML
30 INJECTION, SOLUTION INTRAMUSCULAR; INTRAVENOUS ONCE
Status: COMPLETED | OUTPATIENT
Start: 2022-02-01 | End: 2022-02-01

## 2022-02-01 RX ORDER — ALBUTEROL SULFATE 2.5 MG/3ML
2.5 SOLUTION RESPIRATORY (INHALATION)
Status: DISCONTINUED | OUTPATIENT
Start: 2022-02-01 | End: 2022-02-01 | Stop reason: HOSPADM

## 2022-02-01 RX ORDER — ALBUTEROL SULFATE 2.5 MG/3ML
15 SOLUTION RESPIRATORY (INHALATION)
Status: DISCONTINUED | OUTPATIENT
Start: 2022-02-01 | End: 2022-02-01 | Stop reason: HOSPADM

## 2022-02-01 RX ORDER — ONDANSETRON 2 MG/ML
4 INJECTION INTRAMUSCULAR; INTRAVENOUS ONCE
Status: COMPLETED | OUTPATIENT
Start: 2022-02-01 | End: 2022-02-01

## 2022-02-01 RX ORDER — SODIUM CHLORIDE, SODIUM LACTATE, POTASSIUM CHLORIDE, AND CALCIUM CHLORIDE .6; .31; .03; .02 G/100ML; G/100ML; G/100ML; G/100ML
500 INJECTION, SOLUTION INTRAVENOUS ONCE
Status: COMPLETED | OUTPATIENT
Start: 2022-02-01 | End: 2022-02-01

## 2022-02-01 RX ORDER — METHYLPREDNISOLONE SODIUM SUCCINATE 125 MG/2ML
125 INJECTION, POWDER, LYOPHILIZED, FOR SOLUTION INTRAMUSCULAR; INTRAVENOUS ONCE
Status: COMPLETED | OUTPATIENT
Start: 2022-02-01 | End: 2022-02-01

## 2022-02-01 RX ORDER — ASPIRIN 81 MG/1
324 TABLET, CHEWABLE ORAL ONCE
Status: COMPLETED | OUTPATIENT
Start: 2022-02-01 | End: 2022-02-01

## 2022-02-01 RX ADMIN — ONDANSETRON 4 MG: 2 INJECTION INTRAMUSCULAR; INTRAVENOUS at 14:36

## 2022-02-01 RX ADMIN — INSULIN GLARGINE 50 UNITS: 100 INJECTION, SOLUTION SUBCUTANEOUS at 15:49

## 2022-02-01 RX ADMIN — METHYLPREDNISOLONE SODIUM SUCCINATE 125 MG: 125 INJECTION, POWDER, FOR SOLUTION INTRAMUSCULAR; INTRAVENOUS at 14:36

## 2022-02-01 RX ADMIN — IPRATROPIUM BROMIDE AND ALBUTEROL SULFATE 1 AMPULE: .5; 3 SOLUTION RESPIRATORY (INHALATION) at 14:50

## 2022-02-01 RX ADMIN — KETOROLAC TROMETHAMINE 30 MG: 30 INJECTION, SOLUTION INTRAMUSCULAR at 16:15

## 2022-02-01 RX ADMIN — SODIUM CHLORIDE, POTASSIUM CHLORIDE, SODIUM LACTATE AND CALCIUM CHLORIDE 500 ML: 600; 310; 30; 20 INJECTION, SOLUTION INTRAVENOUS at 16:15

## 2022-02-01 RX ADMIN — INSULIN LISPRO 8 UNITS: 100 INJECTION, SOLUTION INTRAVENOUS; SUBCUTANEOUS at 15:50

## 2022-02-01 RX ADMIN — FENTANYL CITRATE 50 MCG: 50 INJECTION, SOLUTION INTRAMUSCULAR; INTRAVENOUS at 17:21

## 2022-02-01 RX ADMIN — ASPIRIN 324 MG: 81 TABLET, CHEWABLE ORAL at 14:20

## 2022-02-01 ASSESSMENT — ENCOUNTER SYMPTOMS
BACK PAIN: 0
RHINORRHEA: 1
PHOTOPHOBIA: 0
COUGH: 1
SORE THROAT: 1
SHORTNESS OF BREATH: 0

## 2022-02-01 ASSESSMENT — HEART SCORE: ECG: 1

## 2022-02-01 ASSESSMENT — PAIN SCALES - GENERAL
PAINLEVEL_OUTOF10: 7
PAINLEVEL_OUTOF10: 8

## 2022-02-01 NOTE — ED PROVIDER NOTES
[address] HANDOFF       Handoff taken on the following patient from prior Attending Physician:  Pt Name: Kylah Em  PCP:  Ollie Zelaya MD    Attestation  I was available and discussed any additional care issues that arose and coordinated the management plans with the resident(s) caring for the patient during my duty period. Any areas of disagreement with resident's documentation of care or procedures are noted on the chart. I was personally present for the key portions of any/all procedures during my duty period. I have documented in the chart those procedures where I was not present during the key portions. CHIEF COMPLAINT       Chief Complaint   Patient presents with    Chest Pain     The patient reports chest pain, SOB and notes known history of COPD.          CURRENT MEDICATIONS     Previous Medications  Previous Medications    ALBUTEROL (PROVENTIL) (2.5 MG/3ML) 0.083% NEBULIZER SOLUTION    Take 3 mLs by nebulization every 6 hours as needed for Wheezing    ALCOHOL SWABS (B-D SINGLE USE SWABS REGULAR) PADS    THREE TIMES A DAY    ASPIRIN 81 MG EC TABLET    TAKE 1 TABLET BY MOUTH ONCE DAILY    ATORVASTATIN (LIPITOR) 40 MG TABLET    Take 1 tablet by mouth daily    BIPAP MACHINE MISC    repair/replace Bipap maintain 18/9CMH2O, Cflex=3,mask,tubing,filters,headgear,heated humidifier,all supplies PRN    BLOOD GLUCOSE MONITORING SUPPL (ONE TOUCH ULTRA 2) W/DEVICE KIT        BLOOD GLUCOSE MONITORING SUPPL HARMEET    Use daily to check BS    BLOOD GLUCOSE TEST STRIPS (ONETOUCH ULTRA) STRIP    TEST TWO (2) TO THREE (3) TIMES A DAY & AS NEEDED FOR SYMPTOMS OF IRREGULAR BLOOD GLUCOSE    BLOOD GLUCOSE TEST STRIPS (ONETOUCH ULTRA) STRIP    TEST TWO (2) TO THREE (3) TIMES A DAY & AS NEEDED FOR SYMPTOMS OF IRREGULAR BLOOD GLUCOSE    BLOOD GLUCOSE TEST STRIPS (TRUE METRIX BLOOD GLUCOSE TEST) STRIP    THREE TIMES A DAY    CARVEDILOL (COREG) 3.125 MG TABLET    TAKE 1 TAB BY MOUTH TWICE A DAY ( IN THE MORNING AND BEDTIME )    CITALOPRAM (CELEXA) 20 MG TABLET    TAKE 1 TABLET BY MOUTH DAILY NEEDS TO DECREASE THE CELEXA TO 20 MG DUE TO SIDE EFFECTS ON THE HEART    CLOPIDOGREL (PLAVIX) 75 MG TABLET    TAKE 1 TAB BY MOUTH ONCE A DAY ( IN THE MORNING ) -THIS MEDICINE MAY BE TAKENWITH OR WITHOUT FOOD     COMPRESSION STOCKINGS MISC    by Does not apply route Pressure between 20 - 25 . DICYCLOMINE (BENTYL) 10 MG CAPSULE    TAKE 1 CAPSULE BY MOUTH TWICE A DAY AS NEEDED FOR ABDOMINAL SPASMS    DOCUSATE SODIUM (COLACE) 100 MG CAPSULE    TAKE 1 CAPSULE BY MOUTH TWICE A DAY    FENOFIBRATE (TRIGLIDE) 160 MG TABLET    Take 1 tablet by mouth daily    FERROUS SULFATE (IRON 325) 325 (65 FE) MG TABLET    TAKE 1 TAB BY MOUTH EVERY MORNING     FUROSEMIDE (LASIX) 20 MG TABLET    TAKE 1 TABLET BY MOUTH ONCE DAILY AS NEEDED    GABAPENTIN (NEURONTIN) 300 MG CAPSULE    Take 1 capsule by mouth 3 times daily for 30 days. ICOSAPENT ETHYL (VASCEPA) 1 G CAPS CAPSULE    TAKE 1 CAPSULE BY MOUTH TWICE A DAY    INSULIN ASPART (NOVOLOG FLEXPEN) 100 UNIT/ML INJECTION PEN    IF <139 - NO INSULIN; 140-199-2 UNITS;200-249-4 UNITS;250-299-6 UNITS;300-349-8 UNITS;350-400=10 UNITS;ABOVE 400-12 UNITS    INSULIN GLARGINE (BASAGLAR KWIKPEN) 100 UNIT/ML INJECTION PEN    Inject 50 Units into the skin 2 times daily    IPRATROPIUM-ALBUTEROL (DUONEB) 0.5-2.5 (3) MG/3ML SOLN NEBULIZER SOLUTION    Inhale 3 mLs into the lungs every 4 hours    ISOSORBIDE DINITRATE (ISORDIL) 30 MG TABLET    TAKE 1 TABLET BY MOUTH ONCE DAILY    KETOCONAZOLE (NIZORAL) 2 % CREAM    Apply topically  2 times a day.     LIDOCAINE (LMX) 4 % CREAM    Apply topically every 8 hrs as needed for pain    LIFT CHAIR MISC    by Does not apply route    LOSARTAN (COZAAR) 25 MG TABLET    TAKE 1 TAB BY MOUTH ONCE A DAY ( IN THE MORNING )    MAGNESIUM OXIDE (MAG-OX) 400 MG TABLET    TAKE 1 TAB BY MOUTH TWICE A DAY ( IN THE MORNING AND BEDTIME )    MELATONIN 10 MG TABS    Take 10 mg by mouth nightly    METFORMIN (GLUCOPHAGE) 1000 MG TABLET    TAKE 1 TAB BY MOUTH TWICE A DAY ( IN THE MORNING AND BEDTIME )     MISC. DEVICES (ADJUST BATH/SHOWER SEAT/BACK) MISC    Use daily for shower    MULTIPLE VITAMINS-MINERALS (CERTAVITE/ANTIOXIDANTS) TABS    TAKE 1 TABLET BY MOUTH ONCE DAILY    NITROGLYCERIN (NITROSTAT) 0.4 MG SL TABLET    1 under the tongue as needed for angina, may repeat q5mins for up three doses    NYSTATIN (MYCOSTATIN) 509899 UNIT/GM POWDER    Apply 3 times daily. ONETOUCH ULTRA STRIP    TEST TWO (2) TO THREE (3) TIMES A DAY& AS NEEDED     OXYBUTYNIN (DITROPAN XL) 5 MG EXTENDED RELEASE TABLET    Take 1 tablet by mouth daily    OXYCODONE-ACETAMINOPHEN (PERCOCET) 5-325 MG PER TABLET    Take 1 tablet by mouth See Admin Instructions for 30 days. Intended supply: 30 days. 1 tab 3-4 times a day prn    OXYGEN    Inhale 3 L into the lungs     PANTOPRAZOLE (PROTONIX) 40 MG TABLET    TAKE 1 TABLET BY MOUTH TWICE A DAY    PSYLLIUM (METAMUCIL PO)    Take by mouth 3 times daily Takes powder    SYMBICORT 160-4.5 MCG/ACT AERO      2 puffs As needed for sob    TOPIRAMATE (TOPAMAX) 100 MG TABLET    TAKE 1 TABLET BY MOUTH NIGHTLY     ULTICARE MINI PEN NEEDLES 31G X 6 MM MISC    USE AS DIRECTED     VITAMIN B-12 (CYANOCOBALAMIN) 100 MCG TABLET    take half a tablet BY MOUTH ONCE DAILY       Encounter Medications  Orders Placed This Encounter   Medications    ondansetron (ZOFRAN) injection 4 mg    aspirin chewable tablet 324 mg    AND Linked Order Group     albuterol (PROVENTIL) nebulizer solution 2.5 mg      Order Specific Question:   Initiate RT Bronchodilator Protocol      Answer: Yes     ipratropium (ATROVENT) 0.02 % nebulizer solution 0.25 mg      Order Specific Question:   Initiate RT Bronchodilator Protocol      Answer: Yes    AND Linked Order Group     albuterol (PROVENTIL) nebulizer solution 15 mg      Order Specific Question:   Initiate RT Bronchodilator Protocol      Answer:    Yes  ipratropium (ATROVENT) 0.02 % nebulizer solution 0.25 mg      Order Specific Question:   Initiate RT Bronchodilator Protocol      Answer: Yes    methylPREDNISolone sodium (SOLU-MEDROL) injection 125 mg    ipratropium-albuterol (DUONEB) nebulizer solution 1 ampule     Order Specific Question:   Initiate RT Bronchodilator Protocol     Answer: Yes    insulin lispro (HUMALOG) injection vial 8 Units    insulin glargine (LANTUS) injection vial 50 Units    ketorolac (TORADOL) injection 30 mg    lactated ringers bolus       ALLERGIES     is allergic to robitussin [guaifenesin], codeine, compazine [prochlorperazine maleate], iodides, morphine, moxifloxacin, reglan [metoclopramide], avelox [moxifloxacin hcl in nacl], cipro xr, and sulfa antibiotics. RECENT VITALS:   Temp: 98 °F (36.7 °C),  Pulse: 78, Resp: 18, BP: (!) 143/117    RADIOLOGY:   XR CHEST PORTABLE   Final Result   Unchanged appearance of the chest without acute airspace disease identified.              LABS:  Labs Reviewed   CBC WITH AUTO DIFFERENTIAL - Abnormal; Notable for the following components:       Result Value    Seg Neutrophils 68 (*)     Lymphocytes 21 (*)     All other components within normal limits   IMMATURE PLATELET FRACTION - Abnormal; Notable for the following components:    Platelet, Immature Fraction 13.3 (*)     Platelet, Fluorescence 95 (*)     All other components within normal limits   BASIC METABOLIC PANEL W/ REFLEX TO MG FOR LOW K - Abnormal; Notable for the following components:    Glucose 356 (*)     CREATININE 0.97 (*)     Sodium 134 (*)     Chloride 97 (*)     GFR Non- 56 (*)     All other components within normal limits   POC GLUCOSE FINGERSTICK - Abnormal; Notable for the following components:    POC Glucose 343 (*)     All other components within normal limits   POCT GLUCOSE - Normal   COVID-19, RAPID   TROPONIN   BRAIN NATRIURETIC PEPTIDE   URINALYSIS WITH MICROSCOPIC   TROPONIN     Chest pain, shortness of breath, COPD history, wheezing, elevated blood sugar. Received steroids and bronchodilators. PLAN/ TASKS OUTSTANDING       Reassess, disposition    (Please note that portions of this note were completed with a voice recognition program.  Efforts were made to edit the dictations but occasionally words are mis-transcribed. )    Saint Cirri, MD,, MD, F.A.C.E.P.   Attending Emergency Physician        Saint Cirri, MD  02/01/22 [id number]

## 2022-02-01 NOTE — ED PROVIDER NOTES
[facility] ED  Emergency Department Encounter  EmergencyMedicine Resident     Pt Name:Mariangel Pedraza  MRN: [id number]  [place] 1950  Date of evaluation: 2/1/22  PCP:  Patrick Rodriguez MD    This patient was evaluated in the Emergency Department for symptoms described in the history of present illness. The patient was evaluated in the context of the global COVID-19 pandemic, which necessitated consideration that the patient might be at risk for infection with the SARS-CoV-2 virus that causes COVID-19. Institutional protocols and algorithms that pertain to the evaluation of patients at risk for COVID-19 are in a state of rapid change based on information released by regulatory bodies including the CDC and federal and state organizations. These policies and algorithms were followed during the patient's care in the ED. CHIEF COMPLAINT       Chief Complaint   Patient presents with    Chest Pain     The patient reports chest pain, SOB and notes known history of COPD. HISTORY OF PRESENT ILLNESS  (Location/Symptom, Timing/Onset, Context/Setting, Quality, Duration, Modifying Factors, Severity.)      Marco Bryant is a 66 y.o. female who presents with complaints of chest pain that started last night but then resolved and restarted this morning it 730 scribed as heaviness over midsternal region, worse with cough or inspiration. Patient notes history of chest pain in the past but no history of MI or previous stents. Patient has history of CHF and COPD and wears oxygen intermittently as needed. Notes she has not been using her inhalers more than usual but has a nonproductive cough that started yesterday and sore throat started this morning. Denies shortness of breath. Does complain of some nausea but no vomiting or diarrhea. No abdominal pain. Takes Plavix.   Patient has history of type 2 diabetes and takes insulin but notes she missed her insulin dose this morning and did have a couple bananas. Does note she has had COVID vaccine and booster. No one else sick at home. She was just recently treated for UTI couple weeks ago and completed antibiotics. No dysuria or frequency. PAST MEDICAL / SURGICAL / SOCIAL / FAMILY HISTORY      has a past medical history of Abdominal bloating, Adenomatous polyp of ascending colon, Allergic rhinitis, Anxiety, Arthritis, Asthma, Atrial fibrillation (HCC), Back pain, Benign hypertension with CKD (chronic kidney disease) stage III ([address] CAD (coronary artery disease), Caffeine use, CHF (congestive heart failure) ([address] Chronic kidney disease, CKD (chronic kidney disease) stage 3, GFR 30-59 ml/min ([facility]), COPD (chronic obstructive pulmonary disease) ([address] Degeneration of lumbar or lumbosacral intervertebral disc, Depression, DM (diabetes mellitus) ([address] Emphysema of lung ([address] Gastritis, GERD (gastroesophageal reflux disease), Hearing loss, Hematuria, Hiatal hernia, HTN (hypertension), benign, Hypertriglyceridemia, Incontinence of urine, Irritable bowel syndrome with both constipation and diarrhea, Knee arthropathy, Lumbosacral spondylosis without myelopathy, Migraine headache, Mumps, Neuropathy, Obesity, On home oxygen therapy, HIGINIO on CPAP, Otitis media, Secondary diabetes mellitus with stage 3 chronic kidney disease (GFR 30-59) ([address] Stroke ([address] Tinnitus, Type 2 diabetes mellitus without complication ([address] Type II or unspecified type diabetes mellitus without mention of complication, not stated as uncontrolled, UTI (lower urinary tract infection), and Varicose vein. has a past surgical history that includes Cholecystectomy (2007); Carpal tunnel release (Right); Tympanoplasty; Dilation & curettage (1979); Cystocopy (9/25/2013); Cataract removal with implant (Bilateral); Tonsillectomy; Incontinence surgery; ablation of dysrhythmic focus (2000); Upper gastrointestinal endoscopy (03/2016); eye surgery;  Tubal ligation; other surgical history (09/10/15); Insertable Cardiac Monitor (2015); Tympanoplasty; Colonoscopy; Colonoscopy (04/10/2017); pr colsc flx w/removal lesion by hot bx forceps (N/A, 4/10/2017); Tympanostomy tube placement (2017); Upper gastrointestinal endoscopy (N/A, 3/2/2021); and Colonoscopy (N/A, 3/2/2021). Social History     Socioeconomic History    Marital status: Legally      Spouse name: Not on file    Number of children: Not on file    Years of education: Not on file    Highest education level: Not on file   Occupational History    Occupation: disabled     Employer: N/A   Tobacco Use    Smoking status: Former Smoker     Packs/day: 3.00     Years: 41.00     Pack years: 123.00     Types: Cigarettes     Quit date: 2000     Years since quittin.1    Smokeless tobacco: Never Used    Tobacco comment: quit in    Vaping Use    Vaping Use: Never used   Substance and Sexual Activity    Alcohol use: No     Alcohol/week: 0.0 standard drinks    Drug use: No    Sexual activity: Not Currently   Other Topics Concern    Not on file   Social History Narrative    Not on file     Social Determinants of Health     Financial Resource Strain:     Difficulty of Paying Living Expenses: Not on file   Food Insecurity:     Worried About [address] in the Last Year: Not on file    [address] in the Last Year: Not on file   Transportation Needs:     Lack of Transportation (Medical): Not on file    Lack of Transportation (Non-Medical):  Not on file   Physical Activity:     Days of Exercise per Week: Not on file    Minutes of Exercise per Session: Not on file   Stress:     Feeling of Stress : Not on file   Social Connections:     Frequency of Communication with Friends and Family: Not on file    Frequency of Social Gatherings with Friends and Family: Not on file    Attends Sabianist Services: Not on file    Active Member of Clubs or Organizations: Not on file    Attends Club or Organization Meetings: Not on file    Marital Status: Not on file   Intimate Partner Violence:     Fear of Current or Ex-Partner: Not on file    Emotionally Abused: Not on file    Physically Abused: Not on file    Sexually Abused: Not on file   Housing Stability:     Unable to Pay for Housing in the Last Year: Not on file    Number of Argeliamodariela in the Last Year: Not on file    Unstable Housing in the Last Year: Not on file       Family History   Problem Relation Age of Onset    Diabetes Mother     Heart Disease Mother     Stomach Cancer Father     Diabetes Maternal Grandmother         also aunts and uncles (maternal)    Emphysema Sister        Allergies:  Robitussin [guaifenesin], Codeine, Compazine [prochlorperazine maleate], Iodides, Morphine, Moxifloxacin, Reglan [metoclopramide], Avelox [moxifloxacin hcl in nacl], Cipro xr, and Sulfa antibiotics    Home Medications:  Prior to Admission medications    Medication Sig Start Date End Date Taking? Authorizing Provider   ibuprofen (ADVIL;MOTRIN) 600 MG tablet Take 1 tablet by mouth 3 times daily as needed for Pain 2/1/22  Yes Jennifer Fuentes MD   predniSONE (DELTASONE) 5 MG tablet Take 4 tablets by mouth once daily for 5 days 2/1/22 2/11/22 Yes Jennifer Fuentes MD   carvedilol (COREG) 3.125 MG tablet TAKE 1 TAB BY MOUTH TWICE A DAY ( IN THE MORNING AND BEDTIME ) 1/21/22   Nasir Bermudez MD   oxyCODONE-acetaminophen (PERCOCET) 5-325 MG per tablet Take 1 tablet by mouth See Admin Instructions for 30 days. Intended supply: 30 days. 1 tab 3-4 times a day prn 1/10/22 2/9/22  Miriam Tao MD   ketoconazole (NIZORAL) 2 % cream Apply topically  2 times a day.  12/29/21   Nasir Bermudez MD   oxybutynin (DITROPAN XL) 5 MG extended release tablet Take 1 tablet by mouth daily 12/29/21   Nasir Bermudez MD   citalopram (CELEXA) 20 MG tablet TAKE 1 TABLET BY MOUTH DAILY NEEDS TO DECREASE THE CELEXA TO 20 MG DUE TO SIDE EFFECTS ON THE HEART 12/8/21 Brigitte Mariano MD   aspirin 81 MG EC tablet TAKE 1 TABLET BY MOUTH ONCE DAILY 12/8/21   Brigitte Mariano MD   pantoprazole (PROTONIX) 40 MG tablet TAKE 1 TABLET BY MOUTH TWICE A DAY 12/8/21   Brigitte Mariano MD   docusate sodium (COLACE) 100 MG capsule TAKE 1 CAPSULE BY MOUTH TWICE A DAY 12/8/21   Brigitte Mariano MD   Multiple Vitamins-Minerals (CERTAVITE/ANTIOXIDANTS) TABS TAKE 1 TABLET BY MOUTH ONCE DAILY 12/8/21   Brigitte Mariano MD   isosorbide dinitrate (ISORDIL) 30 MG tablet TAKE 1 TABLET BY MOUTH ONCE DAILY 12/8/21   Brigitte Mariano MD   vitamin B-12 (CYANOCOBALAMIN) 100 MCG tablet take half a tablet BY MOUTH ONCE DAILY 11/24/21   Jessy Clements MD   furosemide (LASIX) 20 MG tablet TAKE 1 TABLET BY MOUTH ONCE DAILY AS NEEDED 11/24/21   Jessy Clements MD   Icosapent Ethyl (VASCEPA) 1 g CAPS capsule TAKE 1 CAPSULE BY MOUTH TWICE A DAY 11/24/21   Anaya Hill MD   dicyclomine (BENTYL) 10 MG capsule TAKE 1 CAPSULE BY MOUTH TWICE A DAY AS NEEDED FOR ABDOMINAL SPASMS 11/24/21   Jessy Clements MD   fenofibrate (TRIGLIDE) 160 MG tablet Take 1 tablet by mouth daily 10/27/21   Brigitte Mariano MD   insulin glargine (BASAGLAR KWIKPEN) 100 UNIT/ML injection pen Inject 50 Units into the skin 2 times daily 10/26/21   Brigitte Mariano MD   topiramate (TOPAMAX) 100 MG tablet TAKE 1 TABLET BY MOUTH NIGHTLY  10/25/21   Nicholes Sandhoff, MD   clopidogrel (PLAVIX) 75 MG tablet TAKE 1 TAB BY MOUTH ONCE A DAY ( IN THE MORNING ) -THIS MEDICINE MAY BE TAKENWITH OR WITHOUT FOOD  10/25/21   Brigitte Mariano MD   metFORMIN (GLUCOPHAGE) 1000 MG tablet TAKE 1 TAB BY MOUTH TWICE A DAY ( IN THE MORNING AND BEDTIME )  10/1/21   Brigitte Mariano MD   blood glucose test strips (ONETOUCH ULTRA) strip TEST TWO (2) TO THREE (3) TIMES A DAY & AS NEEDED FOR SYMPTOMS OF IRREGULAR BLOOD GLUCOSE 8/23/21   Brigitte Mariano MD   blood glucose test strips (ONETOUCH ULTRA) strip TEST TWO (2) TO THREE (3) TIMES A DAY & AS NEEDED FOR SYMPTOMS OF IRREGULAR BLOOD GLUCOSE 8/20/21   Rebeca Sorenson MD   Blood Glucose Monitoring Suppl (ONE TOUCH ULTRA 2) w/Device KIT  2/10/21   Historical Provider, MD   ONETOUCH ULTRA strip TEST TWO (2) TO THREE (3) TIMES A DAY& AS NEEDED  3/5/21   Rebeca Sorenson MD   insulin aspart (NOVOLOG FLEXPEN) 100 UNIT/ML injection pen IF <139 - NO INSULIN; 140-199-2 UNITS;200-249-4 UNITS;250-299-6 UNITS;300-349-8 UNITS;350-400=10 UNITS;ABOVE 400-12 UNITS 3/3/21   Rebeca Sorenson MD   atorvastatin (LIPITOR) 40 MG tablet Take 1 tablet by mouth daily 2/28/21   Rebeca Sorenson MD   BiPAP Machine MISC repair/replace Bipap maintain 18/9CMH2O, Cflex=3,mask,tubing,filters,headgear,heated humidifier,all supplies PRN 1/28/21   Historical Provider, MD   Psyllium (METAMUCIL PO) Take by mouth 3 times daily Takes powder    Historical Provider, MD   blood glucose test strips (TRUE METRIX BLOOD GLUCOSE TEST) strip THREE TIMES A DAY 1/21/21   Rebeca Sorenson MD   ferrous sulfate (IRON 325) 325 (65 Fe) MG tablet TAKE 1 TAB BY MOUTH EVERY MORNING  1/15/21   Rebeca Sorenson MD   losartan (COZAAR) 25 MG tablet TAKE 1 TAB BY MOUTH ONCE A DAY ( IN THE MORNING ) 1/15/21   Rebeca Sorenson MD   magnesium oxide (MAG-OX) 400 MG tablet TAKE 1 TAB BY MOUTH TWICE A DAY ( IN THE MORNING AND BEDTIME ) 1/15/21   Rebeca Sorenson MD   albuterol (PROVENTIL) (2.5 MG/3ML) 0.083% nebulizer solution Take 3 mLs by nebulization every 6 hours as needed for Wheezing 11/3/20   Loren Rose MD   ULTICARE MINI PEN NEEDLES 31G X 6 MM MISC USE AS DIRECTED  8/14/20   Rebeca Sorenson MD   gabapentin (NEURONTIN) 300 MG capsule Take 1 capsule by mouth 3 times daily for 30 days. Patient taking differently: Take 300 mg by mouth 2 times daily. 7/30/20 12/9/21  Sam Home, APRN - CNP   nystatin (MYCOSTATIN) 828029 UNIT/GM powder Apply 3 times daily.  6/25/20   Rebeca Sorenson MD   lidocaine (LMX) 4 % cream Apply topically every 8 hrs as needed for pain 11/11/19   Rebeca Sorenson MD   Lift Chair [id number] by Does not apply route 9/24/19   Edna Gibson MD   Misc. Devices (ADJUST BATH/SHOWER SEAT/BACK) MISC Use daily for shower 9/24/19   Edna Gibson MD   Alcohol Swabs (B-D SINGLE USE SWABS REGULAR) PADS THREE TIMES A DAY 12/12/18   Edna Gibson MD   nitroGLYCERIN (NITROSTAT) 0.4 MG SL tablet 1 under the tongue as needed for angina, may repeat q5mins for up three doses 8/28/18   Historical Provider, MD   Blood Glucose Monitoring Suppl HARMEET Use daily to check BS 3/27/18   Edna Gibson MD   ipratropium-albuterol (DUONEB) 0.5-2.5 (3) MG/3ML SOLN nebulizer solution Inhale 3 mLs into the lungs every 4 hours 2/6/18   Ayah Luevano MD   Melatonin 10 MG TABS Take 10 mg by mouth nightly 10/19/17   Edna Gibson MD   Compression Stockings MISC by Does not apply route Pressure between 20 - 25 . 9/11/17   Edna Gibson MD   SYMBICORT 160-4.5 MCG/ACT AERO   2 puffs As needed for sob 5/28/15   Historical Provider, MD   OXYGEN Inhale 3 L into the lungs     Historical Provider, MD       REVIEW OF SYSTEMS    (2-9 systems for level 4, 10 or more for level 5)      Review of Systems   Constitutional: Negative for appetite change and chills. HENT: Positive for rhinorrhea and sore throat. Eyes: Negative for photophobia and visual disturbance. Respiratory: Positive for cough. Negative for shortness of breath. Cardiovascular: Positive for chest pain. Negative for leg swelling. Genitourinary: Negative for dysuria and frequency. Musculoskeletal: Negative for back pain and neck pain. Neurological: Positive for light-headedness. Negative for syncope and headaches. PHYSICAL EXAM   (up to 7 for level 4, 8 or more for level 5)      INITIAL VITALS:   BP (!) 146/82   Pulse 80   Temp 98 °F (36.7 °C)   Resp 22   LMP  (LMP Unknown)   SpO2 95%     Physical Exam  Constitutional:       General: She is not in acute distress. HENT:      Head: Normocephalic and atraumatic.       Right Ear: External ear normal. Initiate RT Bronchodilator Protocol      Answer: Yes     ipratropium (ATROVENT) 0.02 % nebulizer solution 0.25 mg      Order Specific Question:   Initiate RT Bronchodilator Protocol      Answer: Yes    methylPREDNISolone sodium (SOLU-MEDROL) injection 125 mg    ipratropium-albuterol (DUONEB) nebulizer solution 1 ampule     Order Specific Question:   Initiate RT Bronchodilator Protocol     Answer: Yes    insulin lispro (HUMALOG) injection vial 8 Units    insulin glargine (LANTUS) injection vial 50 Units    ketorolac (TORADOL) injection 30 mg    lactated ringers bolus    DISCONTD: acetaminophen (TYLENOL) tablet 1,000 mg    DISCONTD: diphenhydrAMINE (BENADRYL) injection 25 mg    fentaNYL (SUBLIMAZE) injection 50 mcg    DISCONTD: predniSONE (DELTASONE) 20 MG tablet     Sig: Take 4 tablets by mouth once daily for 5 days     Dispense:  20 tablet     Refill:  0    ibuprofen (ADVIL;MOTRIN) 600 MG tablet     Sig: Take 1 tablet by mouth 3 times daily as needed for Pain     Dispense:  30 tablet     Refill:  0    predniSONE (DELTASONE) 5 MG tablet     Sig: Take 4 tablets by mouth once daily for 5 days     Dispense:  20 tablet     Refill:  0       DDX: ACS, arrhythmia, COPD, CHF, Covid    DIAGNOSTIC RESULTS / EMERGENCY DEPARTMENT COURSE / MDM   LAB RESULTS:  Results for orders placed or performed during the hospital encounter of 02/01/22   COVID-19, Rapid    Specimen: Nasopharyngeal Swab   Result Value Ref Range    Specimen Description . NASOPHARYNGEAL SWAB     SARS-CoV-2, Rapid Not Detected Not Detected   CBC Auto Differential   Result Value Ref Range    WBC 8.3 3.5 - 11.3 k/uL    RBC 4.69 3.95 - 5.11 m/uL    Hemoglobin 14.6 11.9 - 15.1 g/dL    Hematocrit 45.8 36.3 - 47.1 %    MCV 97.7 82.6 - 102.9 fL    MCH 31.1 25.2 - 33.5 pg    MCHC 31.9 28.4 - 34.8 g/dL    RDW 13.1 11.8 - 14.4 %    Platelets See Reflexed IPF Result 138 - 453 k/uL    MPV NOT REPORTED 8.1 - 13.5 fL    NRBC Automated 0.0 0.0 per 100 WBC Differential Type NOT REPORTED     WBC Morphology NOT REPORTED     RBC Morphology NOT REPORTED     Platelet Estimate NOT REPORTED     Seg Neutrophils 68 (H) 36 - 65 %    Lymphocytes 21 (L) 24 - 43 %    Monocytes 8 3 - 12 %    Eosinophils % 2 1 - 4 %    Basophils 1 0 - 2 %    Immature Granulocytes 0 0 %    Segs Absolute 5.62 1.50 - 8.10 k/uL    Absolute Lymph # 1.70 1.10 - 3.70 k/uL    Absolute Mono # 0.67 0.10 - 1.20 k/uL    Absolute Eos # 0.19 0.00 - 0.44 k/uL    Basophils Absolute 0.07 0.00 - 0.20 k/uL    Absolute Immature Granulocyte 0.03 0.00 - 0.30 k/uL   Troponin   Result Value Ref Range    Troponin, High Sensitivity 9 0 - 14 ng/L    Troponin T NOT REPORTED <0.03 ng/mL    Troponin Interp NOT REPORTED    Urinalysis with Microscopic   Result Value Ref Range    Color, UA Yellow Yellow    Turbidity UA Clear Clear    Glucose, Ur 3+ (A) NEGATIVE    Bilirubin Urine NEGATIVE NEGATIVE    Ketones, Urine NEGATIVE NEGATIVE    Specific Gravity, UA 1.031 (H) 1.005 - 1.030    Urine Hgb NEGATIVE NEGATIVE    pH, UA 5.5 5.0 - 8.0    Protein, UA NEGATIVE NEGATIVE    Urobilinogen, Urine Normal Normal    Nitrite, Urine NEGATIVE NEGATIVE    Leukocyte Esterase, Urine NEGATIVE NEGATIVE    -          WBC, UA 10 TO 20 0 - 5 /HPF    RBC, UA None 0 - 2 /HPF    Casts UA 0 TO 2 0 - 2 /LPF    Casts UA HYALINE 0 - 2 /LPF    Crystals, UA NOT REPORTED None /HPF    Epithelial Cells UA 5 TO 10 0 - 5 /HPF    Renal Epithelial, UA NOT REPORTED 0 /HPF    Bacteria, UA NOT REPORTED None    Mucus, UA NOT REPORTED None    Trichomonas, UA NOT REPORTED None    Amorphous, UA NOT REPORTED None    Other Observations UA NOT REPORTED NOT REQ.     Yeast, UA FEW (A) None   Immature Platelet Fraction   Result Value Ref Range    Platelet, Immature Fraction 13.3 (H) 1.1 - 10.3 %    Platelet, Fluorescence 95 (L) 138 - 453 k/uL   Basic Metabolic Panel w/ Reflex to MG   Result Value Ref Range    Glucose 356 (H) 70 - 99 mg/dL    BUN 15 8 - 23 mg/dL    CREATININE 0.97 (H) 0.50 - 0.90 mg/dL    Bun/Cre Ratio NOT REPORTED 9 - 20    Calcium 9.2 8.6 - 10.4 mg/dL    Sodium 134 (L) 135 - 144 mmol/L    Potassium 4.6 3.7 - 5.3 mmol/L    Chloride 97 (L) 98 - 107 mmol/L    CO2 24 20 - 31 mmol/L    Anion Gap 13 9 - 17 mmol/L    GFR Non-African American 56 (L) >60 mL/min    GFR African American >60 >60 mL/min    GFR Comment          GFR Staging NOT REPORTED    Brain Natriuretic Peptide   Result Value Ref Range    Pro- <300 pg/mL    BNP Interpretation NOT REPORTED    Troponin   Result Value Ref Range    Troponin, High Sensitivity 9 0 - 14 ng/L    Troponin T NOT REPORTED <0.03 ng/mL    Troponin Interp NOT REPORTED    SPECIMEN REJECTION   Result Value Ref Range    Specimen Source . BLOOD     Ordered Test BMPX,BNP,TROPI     Reason for Rejection Unable to perform testing: Specimen hemolyzed. - NOT REPORTED    D-DIMER, QUANTITATIVE   Result Value Ref Range    D-Dimer, Quant 0.60 mg/L FEU   POCT glucose   Result Value Ref Range    Glucose 343 mg/dL    QC OK? y    POC Glucose Fingerstick   Result Value Ref Range    POC Glucose 343 (H) 65 - 105 mg/dL   POC Glucose Fingerstick   Result Value Ref Range    POC Glucose 393 (H) 65 - 105 mg/dL       IMPRESSION: Chest pain, COPD exacerbation    RADIOLOGY:  XR CHEST PORTABLE   Final Result   Unchanged appearance of the chest without acute airspace disease identified. EKG  NSR with sinus arrhythmia, L axis, unchanged from previous 10/6/21    Kervin Zhu MD    All EKG's are interpreted by the Emergency Department Physician who either signs or Co-signs this chart in the absence of a cardiologist.    EMERGENCY DEPARTMENT COURSE:  69-year-old female presented to emergency department, history CHF, COPD, type 2 diabetes with complaints of chest pain started last night and improved and restarted this morning at 730 described as heaviness associated with cough, sore throat.   On exam, afebrile, nontachycardic, satting low 90s on room air, lungs with diffuse expiratory wheezes, no swelling to bilateral lower extremities. Plan to get cardiac work-up, COVID. Will give duoneb, steroids, aspirin. Trop 9,9. Pt given Toradol, but still c/o CP and HA. Pt glc needed so patient was given her insulin and provided box lunch. Wheezing improved status post DuoNeb. Covid negative. EKG without ST changes, unchanged from previous. Pt given Fentanyl for continued pain due to allergies. Pain improved status post fentanyl. Patient discharged home with 5 days of prednisone, ibuprofen, instruction to follow-up with PCP within the next 1 week. Informed patient that glucose may be elevated due to steroids over the next 5 days and instructed patient to monitor carefully and utilize sliding scale appropriately. Patient verbalized understanding and agreeable to plan. PROCEDURES:  None    CONSULTS:  None    CRITICAL CARE:  None    FINAL IMPRESSION      1. Chest pain, unspecified type    2.  COPD exacerbation ([facility] [address]          DISPOSITION / PLAN     DISPOSITION Decision To Discharge 02/01/2022 06:10:26 PM      PATIENT REFERRED TO:  Fadumo Baca MD  [address] [facility]  [address] [facility] [address]  [phone]    In 1 week        DISCHARGE MEDICATIONS:  Discharge Medication List as of 2/1/2022  6:13 PM      START taking these medications    Details   predniSONE (DELTASONE) 20 MG tablet Take 4 tablets by mouth once daily for 5 days, Disp-20 tablet, R-0Print             Jovita Swanson MD  Emergency Medicine Resident    (Please note that portions of thisnote were completed with a voice recognition program.  Efforts were made to edit the dictations but occasionally words are mis-transcribed.)     Ángel Gaspar MD  Resident  02/01/22 2022

## 2022-02-01 NOTE — ED PROVIDER NOTES
[address] [facility]     Emergency Department     Faculty Attestation    I performed a history and physical examination of the patient and discussed management with the resident. I have reviewed and agree with the residents findings including all diagnostic interpretations, and treatment plans as written at the time of my review. Any areas of disagreement are noted on the chart. I was personally present for the key portions of any procedures. I have documented in the chart those procedures where I was not present during the key portions. For Physician Assistant/ Nurse Practitioner cases/documentation I have personally evaluated this patient and have completed at least one if not all key elements of the E/M (history, physical exam, and MDM). Additional findings are as noted. This patient was evaluated in the Emergency Department for symptoms described in the history of present illness. The patient was evaluated in the context of the global COVID-19 pandemic, which necessitated consideration that the patient might be at risk for infection with the SARS-CoV-2 virus that causes COVID-19. Institutional protocols and algorithms that pertain to the evaluation of patients at risk for COVID-19 are in a state of rapid change based on information released by regulatory bodies including the CDC and federal and state organizations. These policies and algorithms were followed during the patient's care in the ED. Primary Care Physician: Raisa Tobias MD    History: This is a 66 y.o. female who presents to the Emergency Department with complaint of cough chest pain shortness of breath. Known history of COPD. The patient does use oxygen as needed at home    Physical:   temperature is 98 °F (36.7 °C). Her blood pressure is 143/117 (abnormal) and her pulse is 86. Her respiration is 20 and oxygen saturation is 91%.   Wheezing auscultated throughout, heart regular rate and rhythm, abdomen is soft nontender    Impression: Shortness of breath chest pain, concern for COPD exacerbation    Plan: Albuterol, steroids, chest x-ray, EKG, CBC, BMP, troponin      EKG Interpretation    Interpreted by me  Normal sinus rhythm with sinus arrhythmia and a ventricular of 82, normal VA interval, normal QRS duration, left axis deviation, low voltage QRS, normal QT corrected  Compared EKG of October 6, 2021, no significant change was noted        (Please note that portions of this note were completed with a voice recognition program.  Efforts were made to edit the dictations but occasionally words are mis-transcribed.)    Sanjeev Elias MD, [address] [facility]  Attending Emergency Medicine Physician        Kushal Hall MD  02/01/22 1275

## 2022-02-01 NOTE — ED NOTES
Bed: 08  Expected date:   Expected time:   Means of arrival:   Comments:  rupert Ledezma [facility]  02/01/22 [id number]

## 2022-02-02 ENCOUNTER — TELEPHONE (OUTPATIENT)
Dept: FAMILY MEDICINE CLINIC | Age: 72
End: 2022-02-02

## 2022-02-02 NOTE — ED NOTES
Cab scheduled through [place] [facility].   Trip#: [id number]     Jacqui Mcneal, [facility]  02/01/22 [address], [facility]  02/01/22 [id number]

## 2022-02-02 NOTE — TELEPHONE ENCOUNTER
[facility] ([facility]) ED Follow up Call    Reason for ED visit:  [address] , this is  RHODA from Dr. Uma Valles office, just calling to see how you are doing after your recent ED visit. Did you receive discharge instructions? Yes  Do you understand the discharge instructions? Yes  Did the ED give you any new prescriptions? Yes  Were you able to fill your prescriptions? Yes      Do you have one of our red, yellow and green  Zone sheets that help you to determine when you should go to the ED? Not Applicable      Do you need or want to make a follow up appt with your PCP? NO    Do you have any further needs in the home i.e. Equipment?   Not Applicable        FU appts/Provider:    Future Appointments   Date Time Provider Clarence Mas   2/7/2022  9:40 AM VERONIKA Guthrie - CNP [facility] [id number]   2/17/2022 10:15 AM Alex Velasco DPM [facility]   2/25/2022 10:00 AM Jonny Burns MD [facility]   3/1/2022 10:30 AM Raisa Tobias MD [facility]

## 2022-02-03 LAB
EKG ATRIAL RATE: 82 BPM
EKG P AXIS: 58 DEGREES
EKG P-R INTERVAL: 124 MS
EKG Q-T INTERVAL: 350 MS
EKG QRS DURATION: 70 MS
EKG QTC CALCULATION (BAZETT): 408 MS
EKG R AXIS: -43 DEGREES
EKG T AXIS: 89 DEGREES
EKG VENTRICULAR RATE: 82 BPM

## 2022-02-03 PROCEDURE — 93010 ELECTROCARDIOGRAM REPORT: CPT | Performed by: INTERNAL MEDICINE

## 2022-02-04 RX ORDER — LANOLIN ALCOHOL/MO/W.PET/CERES
CREAM (GRAM) TOPICAL
COMMUNITY
Start: 2022-01-21 | End: 2022-02-21

## 2022-02-06 NOTE — PROGRESS NOTES
[address] PROGRESS NOTE      Patient  completed []  video visit   []   phone call:         Minutes :   [x] in person visit to pain clinic    [x]    to  review Medication Agreement    []  Follow up after procedure   []  Discuss treatment options      Location:  Provider:  working from    []    home    [x]   [facility] - KELLEE VILLANUEVA ,   patient at   [x] pain clinic     []  home         Chief Complaint:  Low back pain      She c/o low back pain radiating down her legs. She states her legs are numb and she uses a walker. She also has pain in her legs and uses a scooter when she goes out, She uses a walker in the house. She had lumbar epidural injection L3, L4 in 9-2019 which caused increased pain and her blood sugars increased to around 500. She sleeps well. She states was in the ED a few weeks ago for URI. Back Pain  This is a chronic problem. The problem occurs constantly. The problem has been gradually worsening since onset. The pain is present in the lumbar spine. The quality of the pain is described as aching. Radiates to: legs. The pain is at a severity of 8/10. The pain is severe. The pain is the same all the time. Exacerbated by: walking. Associated symptoms include chest pain, a fever, headaches, numbness and weakness. Risk factors include obesity and history of osteoporosis. She has tried analgesics (sitting in a recliner) for the symptoms. The treatment provided mild relief.              Treatment goals:  Functional status:   Keep pain under control      Aberrancy:   Any alcoholic beverages     no       Any illegal drugs   no    Analgesia:       8              Adverse  Effects :no      ADL;s :  Light housework      Data:    When was thelast UDS:  2-26-21         Was the UDS appropriate:  [x] yes []   no      Record/Diagnostics Review:      As above, I did review the imaging       DRUG SCREEN, PAIN  Order: [id number]   Status: Final result     Visible to patient: Yes (not seen)     Next appt: 02/07/2022 at 09:40 AM in Pain Management Zac Jonnie CONTRERAS, APRN - CNP)     Dx: DDD (degenerative disc disease), lumb. ..     0 Result Notes    Component Ref Range & Units 2/26/21 1354 1/6/20 1140 7/16/19 1000 8/7/18 1110 6/8/18 0945 9/26/17 1130 6/1/17 0958   Pain Management Drug Panel Interp, Urine  Consistent ARUP  Consistent CMARUP  Consistent CM   Consistent CMARUP  Consistent CM     Comment: (NOTE)   ________________________________________________________________   DRUGS EXPECTED:   OXYCODONE   ________________________________________________________________   CONSISTENT with medications provided:   OXYCODONE: based on oxycodone, noroxycodone, oxymorphone   ________________________________________________________________   INTERPRETIVE INFORMATION: Targeted drug profile Interp   Interpretation depends on accuracy and completeness of patient   medication information submitted by client. 6-Acetylmorphine, Ur  Not Detected ARUP  Not Detected ARUP  Not Detected   Not             EXAMINATION:   MRI OF THE LUMBAR SPINE WITHOUT AND WITH CONTRAST  10/23/2019 5:48 pm       TECHNIQUE:   Multiplanar multisequence MRI of the lumbar spine was performed without and   with the administration of intravenous contrast.       COMPARISON:   MRI lumbar spine 01/08/2018       HISTORY:   ORDERING SYSTEM PROVIDED HISTORY:   TECHNOLOGIST PROVIDED HISTORY:   back pain, weakness, epidural injection 5 weeks ago       FINDINGS:   BONES/ALIGNMENT: There is normal alignment of the spine. The vertebral body   heights are maintained. The bone marrow signal appears unremarkable.    Multilevel Schmorl's nodes identified involving the lower thoracic and upper   lumbar spine.       SPINAL CORD:  The conus terminates normally.       SOFT TISSUES: No abnormal enhancement is seen of the lumbar spine.  No   paraspinal mass identified.       L1-L2: Minimal degenerative loss of disc space height and signal.  There is   no significant disc protrusion, spinal canal stenosis or neural foraminal   narrowing.       L2-L3: Moderate disc space is identified with mild circumferential disc   bulge.  Minimal central canal stenosis.  Mild facet arthropathy.  No central   foramina.  No focal disc protrusion.       L3-L4: Mild disc space disease identified with circumferential disc bulge. There is mild right and moderate left neural foraminal narrowing.  Moderate   degenerate facet arthropathy.  Mild central canal stenosis.       L4-L5: Mild disc space disease identified with circumferential disc bulge. There is a right paracentral disc extrusion which extends caudally 1.2 cm and   effaces the right lateral recess affecting the traversing right L5 nerve   root.  Otherwise mild neural foraminal narrowing.  No central canal stenosis. Moderate facet arthropathy.       L5-S1: Minimal degenerative loss of disc space height and signal.  No focal   disc protrusion.  Minimal neural foraminal narrowing.  Mild-to-moderate facet   arthropathy.           Impression   Right paracentral disc extrusion L4-5 narrowing the right lateral recess. Please correlate with possible right L5 radiculopathy.  This is new from   previous MRI.       Otherwise mild degenerate change.       No evidence of discitis osteomyelitis or epidural abscess.                       Pill count: appropriate    fill date : 2-9-22    Morphine equivalent dose as reported on OARRS: 25  Periodic Controlled Substance Monitoring: Possible medication side effects, risk of tolerance/dependence & alternative treatments discussed. ,No signs of potential drug abuse or diversion identified. ,Assessed functional status. ,Obtaining appropriate analgesic effect of treatment. VERONIKA Zaragoza - CNP)  Review ofOARRS does not show any aberrant prescription behavior. Medication is helping the patient stay active. Patient denies any side effects and reports adequate analgesia.  No sign of misuse/abuse. Past Medical History:   Diagnosis Date    Abdominal bloating 1/26/2021    Adenomatous polyp of ascending colon 1/26/2021    Allergic rhinitis     Anxiety 7/17/2013    Arthritis     Asthma     Atrial fibrillation ([facility]     Back pain     NERVE/DR. GRACIA    Benign hypertension with CKD (chronic kidney disease) stage III ([facility])     CAD (coronary artery disease) 3/21/2013    Caffeine use     2 coffee/day    CHF (congestive heart failure) ([facility])     Chronic kidney disease     CKD (chronic kidney disease) stage 3, GFR 30-59 ml/min ([facility])     COPD (chronic obstructive pulmonary disease) ([facility])     emphysema    Degeneration of lumbar or lumbosacral intervertebral disc     Depression     DM (diabetes mellitus) ([facility])     Emphysema of lung ([facility])     Gastritis     GERD (gastroesophageal reflux disease)     Hearing loss     Hematuria     Hiatal hernia     HTN (hypertension), benign 6/28/2014    Hypertriglyceridemia     Incontinence of urine 9/25/2013    Irritable bowel syndrome with both constipation and diarrhea 1/26/2021    Knee arthropathy     Lumbosacral spondylosis without myelopathy 9/29/2014    Migraine headache     DR. GRACIA    Mumps     Neuropathy     Obesity     On home oxygen therapy     2 L PER NC  HS AND PRN    HIGINIO on CPAP     Otitis media 1-26-15    Secondary diabetes mellitus with stage 3 chronic kidney disease (GFR 30-59) ([facility])     Stroke ([facility] [address]      mini stroke.     Tinnitus     Type 2 diabetes mellitus without complication ([facility])     Type II or unspecified type diabetes mellitus without mention of complication, not stated as uncontrolled     UTI (lower urinary tract infection)     Varicose vein        Past Surgical History:   Procedure Laterality Date    ABLATION OF DYSRHYTHMIC FOCUS  2000    CARPAL TUNNEL RELEASE Right     CATARACT REMOVAL WITH IMPLANT Bilateral     CHOLECYSTECTOMY  2007    COLONOSCOPY      COLONOSCOPY  04/10/2017    tubular tablets by mouth once daily for 5 days, Disp: 20 tablet, Rfl: 0    carvedilol (COREG) 3.125 MG tablet, TAKE 1 TAB BY MOUTH TWICE A DAY ( IN THE MORNING AND BEDTIME ), Disp: 180 tablet, Rfl: 3    ketoconazole (NIZORAL) 2 % cream, Apply topically  2 times a day., Disp: 1 each, Rfl: 0    oxybutynin (DITROPAN XL) 5 MG extended release tablet, Take 1 tablet by mouth daily, Disp: 30 tablet, Rfl: 3    citalopram (CELEXA) 20 MG tablet, TAKE 1 TABLET BY MOUTH DAILY NEEDS TO DECREASE THE CELEXA TO 20 MG DUE TO SIDE EFFECTS ON THE HEART, Disp: 30 tablet, Rfl: 5    aspirin 81 MG EC tablet, TAKE 1 TABLET BY MOUTH ONCE DAILY, Disp: 90 tablet, Rfl: 1    pantoprazole (PROTONIX) 40 MG tablet, TAKE 1 TABLET BY MOUTH TWICE A DAY, Disp: 60 tablet, Rfl: 5    docusate sodium (COLACE) 100 MG capsule, TAKE 1 CAPSULE BY MOUTH TWICE A DAY, Disp: 60 capsule, Rfl: 5    Multiple Vitamins-Minerals (CERTAVITE/ANTIOXIDANTS) TABS, TAKE 1 TABLET BY MOUTH ONCE DAILY, Disp: 30 tablet, Rfl: 5    isosorbide dinitrate (ISORDIL) 30 MG tablet, TAKE 1 TABLET BY MOUTH ONCE DAILY, Disp: 30 tablet, Rfl: 5    vitamin B-12 (CYANOCOBALAMIN) 100 MCG tablet, take half a tablet BY MOUTH ONCE DAILY, Disp: 30 tablet, Rfl: 0    Icosapent Ethyl (VASCEPA) 1 g CAPS capsule, TAKE 1 CAPSULE BY MOUTH TWICE A DAY, Disp: 60 capsule, Rfl: 0    fenofibrate (TRIGLIDE) 160 MG tablet, Take 1 tablet by mouth daily, Disp: 90 tablet, Rfl: 1    insulin glargine (BASAGLAR KWIKPEN) 100 UNIT/ML injection pen, Inject 50 Units into the skin 2 times daily, Disp: 10 mL, Rfl: 2    topiramate (TOPAMAX) 100 MG tablet, TAKE 1 TABLET BY MOUTH NIGHTLY , Disp: 90 tablet, Rfl: 2    clopidogrel (PLAVIX) 75 MG tablet, TAKE 1 TAB BY MOUTH ONCE A DAY ( IN THE MORNING ) -THIS MEDICINE MAY BE TAKENWITH OR WITHOUT FOOD , Disp: 90 tablet, Rfl: 1    metFORMIN (GLUCOPHAGE) 1000 MG tablet, TAKE 1 TAB BY MOUTH TWICE A DAY ( IN THE MORNING AND BEDTIME ) , Disp: 180 tablet, Rfl: 3    atorvastatin (LIPITOR) 40 MG tablet, Take 1 tablet by mouth daily, Disp: 90 tablet, Rfl: 3    Psyllium (METAMUCIL PO), Take by mouth 3 times daily Takes powder, Disp: , Rfl:     ferrous sulfate (IRON 325) 325 (65 Fe) MG tablet, TAKE 1 TAB BY MOUTH EVERY MORNING , Disp: 90 tablet, Rfl: 5    losartan (COZAAR) 25 MG tablet, TAKE 1 TAB BY MOUTH ONCE A DAY ( IN THE MORNING ), Disp: 90 tablet, Rfl: 5    nystatin (MYCOSTATIN) 824580 UNIT/GM powder, Apply 3 times daily. , Disp: 3 Bottle, Rfl: 3    furosemide (LASIX) 20 MG tablet, TAKE 1 TABLET BY MOUTH ONCE DAILY AS NEEDED, Disp: 30 tablet, Rfl: 0    dicyclomine (BENTYL) 10 MG capsule, TAKE 1 CAPSULE BY MOUTH TWICE A DAY AS NEEDED FOR ABDOMINAL SPASMS, Disp: 60 capsule, Rfl: 0    blood glucose test strips (ONETOUCH ULTRA) strip, TEST TWO (2) TO THREE (3) TIMES A DAY & AS NEEDED FOR SYMPTOMS OF IRREGULAR BLOOD GLUCOSE, Disp: 100 strip, Rfl: 0    blood glucose test strips (ONETOUCH ULTRA) strip, TEST TWO (2) TO THREE (3) TIMES A DAY & AS NEEDED FOR SYMPTOMS OF IRREGULAR BLOOD GLUCOSE, Disp: 100 strip, Rfl: 0    Blood Glucose Monitoring Suppl (ONE TOUCH ULTRA 2) w/Device KIT, , Disp: , Rfl:     ONETOUCH ULTRA strip, TEST TWO (2) TO THREE (3) TIMES A DAY& AS NEEDED , Disp: 100 each, Rfl: 0    insulin aspart (NOVOLOG FLEXPEN) 100 UNIT/ML injection pen, IF <139 - NO INSULIN; 140-199-2 UNITS;200-249-4 UNITS;250-299-6 UNITS;300-349-8 UNITS;350-400=10 UNITS;ABOVE 400-12 UNITS, Disp: 15 mL, Rfl: 3    BiPAP Machine MISC, repair/replace Bipap maintain 18/9CMH2O, Cflex=3,mask,tubing,filters,headgear,heated humidifier,all supplies PRN, Disp: , Rfl:     blood glucose test strips (TRUE METRIX BLOOD GLUCOSE TEST) strip, THREE TIMES A DAY, Disp: 100 each, Rfl: 3    albuterol (PROVENTIL) (2.5 MG/3ML) 0.083% nebulizer solution, Take 3 mLs by nebulization every 6 hours as needed for Wheezing, Disp: 120 each, Rfl: 3    ULTICARE MINI PEN NEEDLES 31G X 6 MM MISC, USE AS DIRECTED , Disp: 100 each, Rfl: 5    gabapentin (NEURONTIN) 300 MG capsule, Take 1 capsule by mouth 3 times daily for 30 days. (Patient taking differently: Take 300 mg by mouth 2 times daily. ), Disp: 90 capsule, Rfl: 2    lidocaine (LMX) 4 % cream, Apply topically every 8 hrs as needed for pain, Disp: 45 g, Rfl: 3    Lift Chair MISC, by Does not apply route, Disp: 1 each, Rfl: 0    Misc.  Devices (ADJUST BATH/SHOWER SEAT/BACK) MISC, Use daily for shower, Disp: 1 each, Rfl: 0    Alcohol Swabs (B-D SINGLE USE SWABS REGULAR) PADS, THREE TIMES A DAY, Disp: 100 each, Rfl: 0    nitroGLYCERIN (NITROSTAT) 0.4 MG SL tablet, 1 under the tongue as needed for angina, may repeat q5mins for up three doses, Disp: , Rfl:     Blood Glucose Monitoring Suppl HARMEET, Use daily to check BS, Disp: 1 Device, Rfl: 0    ipratropium-albuterol (DUONEB) 0.5-2.5 (3) MG/3ML SOLN nebulizer solution, Inhale 3 mLs into the lungs every 4 hours, Disp: 360 mL, Rfl: 2    Melatonin 10 MG TABS, Take 10 mg by mouth nightly, Disp: 90 tablet, Rfl: 3    Compression Stockings MISC, by Does not apply route Pressure between 20 - 25 ., Disp: 1 each, Rfl: 0    SYMBICORT 160-4.5 MCG/ACT AERO,  2 puffs As needed for sob, Disp: , Rfl:     OXYGEN, Inhale 3 L into the lungs , Disp: , Rfl:     Family History   Problem Relation Age of Onset    Diabetes Mother     Heart Disease Mother     Stomach Cancer Father     Diabetes Maternal Grandmother         also aunts and uncles (maternal)    Emphysema Sister        Social History     Socioeconomic History    Marital status: Legally      Spouse name: Not on file    Number of children: Not on file    Years of education: Not on file    Highest education level: Not on file   Occupational History    Occupation: disabled     Employer: N/A   Tobacco Use    Smoking status: Former Smoker     Packs/day: 3.00     Years: 41.00     Pack years: 123.00     Types: Cigarettes     Quit date: 2000     Years since quittin.1    Smokeless tobacco: Never Used    Tobacco comment: quit in 2000   Vaping Use    Vaping Use: Never used   Substance and Sexual Activity    Alcohol use: No     Alcohol/week: 0.0 standard drinks    Drug use: No    Sexual activity: Not Currently   Other Topics Concern    Not on file   Social History Narrative    Not on file     Social Determinants of Health     Financial Resource Strain:     Difficulty of Paying Living Expenses: Not on file   Food Insecurity:     Worried About Running Out of Food in the Last Year: Not on file    Nadir of Food in the Last Year: Not on file   Transportation Needs:     Lack of Transportation (Medical): Not on file    Lack of Transportation (Non-Medical): Not on file   Physical Activity:     Days of Exercise per Week: Not on file    Minutes of Exercise per Session: Not on file   Stress:     Feeling of Stress : Not on file   Social Connections:     Frequency of Communication with Friends and Family: Not on file    Frequency of Social Gatherings with Friends and Family: Not on file    Attends [employer] Services: Not on file    Active Member of [address] [place] [address] or Organizations: Not on file    Attends Club or Organization Meetings: Not on file    Marital Status: Not on file   Intimate Partner Violence:     Fear of Current or Ex-Partner: Not on file    Emotionally Abused: Not on file    Physically Abused: Not on file    Sexually Abused: Not on file   Housing Stability:     Unable to Pay for Housing in the Last Year: Not on file    Number of [place] in the Last Year: Not on file    Unstable Housing in the Last Year: Not on file         Review of Systems:  Review of Systems   Constitutional: Positive for fever. Last week fever   HENT: Positive for hearing loss. Eyes:        Glasses   Cardiovascular: Positive for chest pain. Respiratory: Positive for cough and sputum production.          Uses oxygen   Endocrine:        Diabetic:  Blood 200-300 on prednisone Hematologic/Lymphatic: Bruises/bleeds easily. Skin: Negative. Musculoskeletal: Positive for back pain and joint pain. Right hip   Gastrointestinal: Negative. Genitourinary: Positive for nocturia. Neurological: Positive for headaches, loss of balance, numbness and weakness. Uses a walker at home   Psychiatric/Behavioral: Negative. Physical Exam:  /77   Pulse 81   Temp 98.9 °F (37.2 °C) (Skin)   Resp 20   LMP  (LMP Unknown)   SpO2 92%     Physical Exam  HENT:      Head: Normocephalic. Pulmonary:      Effort: Pulmonary effort is normal.   Skin:         Neurological:      Mental Status: She is alert and oriented to person, place, and time. Psychiatric:         Mood and Affect: Mood normal.         Thought Content:  Thought content normal.           Assessment:      Problem List Items Addressed This Visit     Type 2 diabetes mellitus with diabetic polyneuropathy, with long-term current use of insulin (HCC) (Chronic)    Spondylarthrosis    Relevant Medications    oxyCODONE-acetaminophen (PERCOCET) 5-325 MG per tablet (Start on 2/9/2022)    Sacroiliitis, not elsewhere classified ([facility] [address] (Chronic)    Relevant Medications    oxyCODONE-acetaminophen (PERCOCET) 5-325 MG per tablet (Start on 2/9/2022)    Osteoarthritis of cervical spine (Chronic)    Relevant Medications    oxyCODONE-acetaminophen (PERCOCET) 5-325 MG per tablet (Start on 2/9/2022)    Nonintractable migraine, unspecified migraine type    Relevant Medications    oxyCODONE-acetaminophen (PERCOCET) 5-325 MG per tablet (Start on 2/9/2022)    Neuropathy    Medication monitoring encounter (Chronic)    Lumbosacral spondylosis without myelopathy (Chronic)    Relevant Medications    oxyCODONE-acetaminophen (PERCOCET) 5-325 MG per tablet (Start on 2/9/2022)    Lumbar radiculopathy, chronic    Relevant Medications    oxyCODONE-acetaminophen (PERCOCET) 5-325 MG per tablet (Start on 2/9/2022)    DDD (degenerative disc disease), lumbar    Relevant Medications    oxyCODONE-acetaminophen (PERCOCET) 5-325 MG per tablet (Start on 2/9/2022)    DDD (degenerative disc disease), cervical (Chronic)    Relevant Medications    oxyCODONE-acetaminophen (PERCOCET) 5-325 MG per tablet (Start on 2/9/2022)    Chronic, continuous use of opioids    Bilateral low back pain with sciatica - Primary    Relevant Medications    oxyCODONE-acetaminophen (PERCOCET) 5-325 MG per tablet (Start on 2/9/2022)            Treatment Plan:  DISCUSSION: Treatment options discussed withpatient and all questions answered to patient's satisfaction. Possible side effects, risk of tolerance and or dependence and alternative treatments discussed    Obtaining appropriate analgesic effect of treatment   No signs of potential drug abuse or diversion identified    [x] Ill effects of being on chronic pain medications such as sleep disturbances, respiratory depression, hormonal changes, withdrawal symptoms, chronic opioid dependence and tolerance as well as risk of taking opioids with Benzodiazepines and taking opioids along with alcohol,  werediscussed with patient. I had asked the patient to minimize medication use and utilize pain medications only for uncontrolled rest pain or pain with exertional activities. I advised patient not to self-escalate painmedications without consulting with us. At each of patient's future visits we will try to taper pain medications, while adjusting the adjunct medications, and re-evaluating for Physical Therapy to improve spinal andjoint strength. We will continue to have discussions to decrease pain medications as tolerated. Counseled patient on effects their pain medication and /or their medical condition mayhave on their  ability to drive or operate machinery.  Instructed not to drive or operate machinery if drowsy     I also discussed with the patient regarding the dangers of combining narcotic pain medication with tranquilizers, alcohol or illegal drugs or taking the medication any way other than prescribed. The dangers were discussed  including respiratory depression and death. Patient was told to tell  all  physicians regarding the medications he is getting from pain clinic. Patient is warned not to take any unprescribed medications over-the-countermedications that can depress breathing . Patient is advised to talk to the pharmacist or physicians if planning to take any over-the-counter medications before  takeing them. Patient is strongly advised to avoid tranquilizers or  relaxants, illegal drugs  or any medications that can depress breathing  Patient is also advised to tell us if there is any changes in their medications from other physicians.     1. Medcation contract signed  2. UDT, today      TREATMENT OPTIONS:       Medication Agreement Requirements Met  Continue Opioid therapy  Script written for  percocet  Follow up appointment made

## 2022-02-07 ENCOUNTER — HOSPITAL ENCOUNTER (OUTPATIENT)
Dept: PAIN MANAGEMENT | Age: 72
Discharge: HOME | End: 2022-02-07
Payer: COMMERCIAL

## 2022-02-07 VITALS
RESPIRATION RATE: 20 BRPM | TEMPERATURE: 98.9 F | DIASTOLIC BLOOD PRESSURE: 77 MMHG | HEART RATE: 81 BPM | OXYGEN SATURATION: 92 % | SYSTOLIC BLOOD PRESSURE: 133 MMHG

## 2022-02-07 DIAGNOSIS — M47.9 SPONDYLARTHROSIS: ICD-10-CM

## 2022-02-07 DIAGNOSIS — M50.30 DDD (DEGENERATIVE DISC DISEASE), CERVICAL: Chronic | ICD-10-CM

## 2022-02-07 DIAGNOSIS — G62.9 NEUROPATHY: ICD-10-CM

## 2022-02-07 DIAGNOSIS — G89.29 CHRONIC BILATERAL LOW BACK PAIN WITH SCIATICA, SCIATICA LATERALITY UNSPECIFIED: Primary | ICD-10-CM

## 2022-02-07 DIAGNOSIS — G43.009 NONINTRACTABLE MIGRAINE, UNSPECIFIED MIGRAINE TYPE: ICD-10-CM

## 2022-02-07 DIAGNOSIS — F11.90 CHRONIC, CONTINUOUS USE OF OPIOIDS: ICD-10-CM

## 2022-02-07 DIAGNOSIS — Z51.81 MEDICATION MONITORING ENCOUNTER: Chronic | ICD-10-CM

## 2022-02-07 DIAGNOSIS — Z79.4 TYPE 2 DIABETES MELLITUS WITH DIABETIC POLYNEUROPATHY, WITH LONG-TERM CURRENT USE OF INSULIN (HCC): Chronic | ICD-10-CM

## 2022-02-07 DIAGNOSIS — M54.40 CHRONIC BILATERAL LOW BACK PAIN WITH SCIATICA, SCIATICA LATERALITY UNSPECIFIED: Primary | ICD-10-CM

## 2022-02-07 DIAGNOSIS — M47.892 OTHER OSTEOARTHRITIS OF SPINE, CERVICAL REGION: Chronic | ICD-10-CM

## 2022-02-07 DIAGNOSIS — E11.42 TYPE 2 DIABETES MELLITUS WITH DIABETIC POLYNEUROPATHY, WITH LONG-TERM CURRENT USE OF INSULIN (HCC): Chronic | ICD-10-CM

## 2022-02-07 DIAGNOSIS — M54.16 LUMBAR RADICULOPATHY, CHRONIC: ICD-10-CM

## 2022-02-07 DIAGNOSIS — M47.817 LUMBOSACRAL SPONDYLOSIS WITHOUT MYELOPATHY: Chronic | ICD-10-CM

## 2022-02-07 DIAGNOSIS — M51.36 DDD (DEGENERATIVE DISC DISEASE), LUMBAR: ICD-10-CM

## 2022-02-07 DIAGNOSIS — M46.1 SACROILIITIS, NOT ELSEWHERE CLASSIFIED (HCC): Chronic | ICD-10-CM

## 2022-02-07 PROCEDURE — 99213 OFFICE O/P EST LOW 20 MIN: CPT | Performed by: NURSE PRACTITIONER

## 2022-02-07 PROCEDURE — 80307 DRUG TEST PRSMV CHEM ANLYZR: CPT

## 2022-02-07 PROCEDURE — 99213 OFFICE O/P EST LOW 20 MIN: CPT

## 2022-02-07 RX ORDER — OXYCODONE HYDROCHLORIDE AND ACETAMINOPHEN 5; 325 MG/1; MG/1
1 TABLET ORAL SEE ADMIN INSTRUCTIONS
Qty: 100 TABLET | Refills: 0 | Status: SHIPPED | OUTPATIENT
Start: 2022-02-09 | End: 2022-03-09 | Stop reason: SDUPTHER

## 2022-02-07 ASSESSMENT — ENCOUNTER SYMPTOMS
GASTROINTESTINAL NEGATIVE: 1
COUGH: 1
SPUTUM PRODUCTION: 1
BACK PAIN: 1

## 2022-02-10 LAB
6-ACETYLMORPHINE, UR: NOT DETECTED
7-AMINOCLONAZEPAM, URINE: NOT DETECTED
ALPHA-OH-ALPRAZ, URINE: NOT DETECTED
ALPHA-OH-MIDAZOLAM, URINE: NOT DETECTED
ALPRAZOLAM, URINE: NOT DETECTED
AMPHETAMINES, URINE: NOT DETECTED
BARBITURATES, URINE: NOT DETECTED
BENZOYLECGONINE, UR: NOT DETECTED
BUPRENORPHINE URINE: NOT DETECTED
CARISOPRODOL, UR: NOT DETECTED
CLONAZEPAM, URINE: NOT DETECTED
CODEINE, URINE: NOT DETECTED
CREATININE URINE: 84.6 MG/DL (ref 20–400)
DIAZEPAM, URINE: NOT DETECTED
DRUGS EXPECTED, UR: NORMAL
EER HI RES INTERP UR: NORMAL
ETHYL GLUCURONIDE UR: NOT DETECTED
FENTANYL URINE: NOT DETECTED
GABAPENTIN: NOT DETECTED
HYDROCODONE, URINE: NOT DETECTED
HYDROMORPHONE, URINE: NOT DETECTED
LORAZEPAM, URINE: NOT DETECTED
MARIJUANA METAB, UR: NOT DETECTED
MDA, UR: NOT DETECTED
MDEA, EVE, UR: NOT DETECTED
MDMA URINE: NOT DETECTED
MEPERIDINE METAB, UR: NOT DETECTED
METHADONE, URINE: NOT DETECTED
METHAMPHETAMINE, URINE: NOT DETECTED
METHYLPHENIDATE: NOT DETECTED
MIDAZOLAM, URINE: NOT DETECTED
MORPHINE URINE: NOT DETECTED
NALOXONE URINE: NOT DETECTED
NORBUPRENORPHINE, URINE: NOT DETECTED
NORDIAZEPAM, URINE: NOT DETECTED
NORFENTANYL, URINE: NOT DETECTED
NORHYDROCODONE, URINE: NOT DETECTED
NOROXYCODONE, URINE: PRESENT
NOROXYMORPHONE, URINE: NOT DETECTED
OXAZEPAM, URINE: NOT DETECTED
OXYCODONE URINE: PRESENT
OXYMORPHONE, URINE: PRESENT
PAIN MANAGEMENT DRUG PANEL INTERP, URINE: NORMAL
PAIN MGT DRUG PANEL, HI RES, UR: NORMAL
PCP,URINE: NOT DETECTED
PHENTERMINE, UR: NOT DETECTED
PREGABALIN: NOT DETECTED
TAPENTADOL, URINE: NOT DETECTED
TAPENTADOL-O-SULFATE, URINE: NOT DETECTED
TEMAZEPAM, URINE: NOT DETECTED
TRAMADOL, URINE: NOT DETECTED
ZOLPIDEM METABOLITE (ZCA), URINE: NOT DETECTED
ZOLPIDEM, URINE: NOT DETECTED

## 2022-02-17 ENCOUNTER — OFFICE VISIT (OUTPATIENT)
Dept: PODIATRY | Age: 72
End: 2022-02-17
Payer: COMMERCIAL

## 2022-02-17 VITALS — WEIGHT: 250 LBS | HEIGHT: 62 IN | BODY MASS INDEX: 46.01 KG/M2

## 2022-02-17 DIAGNOSIS — M79.675 PAIN OF TOES OF BOTH FEET: ICD-10-CM

## 2022-02-17 DIAGNOSIS — B35.1 ONYCHOMYCOSIS OF TOENAIL: Primary | ICD-10-CM

## 2022-02-17 DIAGNOSIS — M79.674 PAIN OF TOES OF BOTH FEET: ICD-10-CM

## 2022-02-17 DIAGNOSIS — E11.51 TYPE 2 DIABETES MELLITUS WITH PERIPHERAL VASCULAR DISEASE (HCC): ICD-10-CM

## 2022-02-17 PROCEDURE — 11721 DEBRIDE NAIL 6 OR MORE: CPT | Performed by: PODIATRIST

## 2022-02-17 PROCEDURE — 99999 PR OFFICE/OUTPT VISIT,PROCEDURE ONLY: CPT | Performed by: PODIATRIST

## 2022-02-17 ASSESSMENT — ENCOUNTER SYMPTOMS
BACK PAIN: 0
NAUSEA: 0
COLOR CHANGE: 0
SHORTNESS OF BREATH: 0
DIARRHEA: 0

## 2022-02-17 NOTE — PROGRESS NOTES
SUBJECTIVE: Dea Cabrera is a 66 y.o. female who returns to the office with chief complaint of painful fungal toenails. Patient relates toe nails are thickened/difficult to trim as well as painful with ambulation and with shoe gear. Chief Complaint   Patient presents with    Nail Problem     b/l nail trim, last seen Veronica Avila MD 12/29/21    Diabetes          Review of Systems   Constitutional: Negative for activity change, appetite change, chills, diaphoresis, fatigue and fever. Respiratory: Negative for shortness of breath. Cardiovascular: Negative for leg swelling. Gastrointestinal: Negative for diarrhea and nausea. Endocrine: Negative for cold intolerance, heat intolerance and polyuria. Musculoskeletal: Positive for arthralgias. Negative for back pain, gait problem, joint swelling and myalgias. Skin: Negative for color change, pallor, rash and wound. Allergic/Immunologic: Negative for environmental allergies and food allergies. Neurological: Negative for dizziness, weakness, light-headedness and numbness. Hematological: Does not bruise/bleed easily. Psychiatric/Behavioral: Negative for behavioral problems, confusion and self-injury. The patient is not nervous/anxious. OBJECTIVE: Clinical evaluation of patient reveals nails 1,2,3,4,5 of the right foot and nails 1,2,3,4,5 of the left foot to present with thickness, elongation, discoloration, brittleness, and subungual debris. There was pain with palpation and debridement of the toenails of the bilateral feet. No open lesions noted to either foot today. The right DP pulse is not palpable. The left DP pulse is palpable. The right PT pulse is not palpable. The left PT pulse is not palpable. Protective sensation is present to the right plantar foot as noted with a 5.07 [place] monofilament. Protective sensation is present to the left plantar foot as noted with a 5.07 [place] monofilament. Glucose: 127 mg/dl. Class A Findings (1 needed)   [] Non-traumatic amputation of foot or integral skeleton portion thereof. [] Q7.      Class B Findings (2 needed)   1. [x] Absent posterior tibial pulse   2. [x] Absent dorsalis pedis pulse   3. [] Advanced trophic changes; three of the following are required:   ·         [] hair growth (decrease or absence)   ·         [] nail changes (thickening)   ·         [] pigmentary changes (discoloration)   ·         [] skin texture (thin, shiny)   ·         [] skin color (rubor or redness)   [x] Q8.      Class C Findings (1 Class B, 2 Class C needed)   1. [] Claudication   2. [] Temperature changes   3. [] Edema   4. [] Paresthesia   5. [] Burning   [] Q9.     ASSESSMENT:    Diagnosis Orders   1. Onychomycosis of toenail  ID DEBRIDEMENT OF NAILS, 6 OR MORE    [facility] DIABETES FOOT EXAM   2. Pain of toes of both feet  ID DEBRIDEMENT OF NAILS, 6 OR MORE    HM DIABETES FOOT EXAM   3. Type 2 diabetes mellitus with peripheral vascular disease (HCC)  ID DEBRIDEMENT OF NAILS, 6 OR MORE    HM DIABETES FOOT EXAM     PLAN: Toenails 1,2,3,4,5 of the right foot and 1,2,3,4,5 of the left foot were debrided in length and thickness using a nail nipper and a . Return in about 9 weeks (around 4/21/2022) for At risk diabetic foot care.    2/17/2022      Tony Perez DPM

## 2022-02-18 DIAGNOSIS — I10 ESSENTIAL HYPERTENSION: ICD-10-CM

## 2022-02-18 NOTE — TELEPHONE ENCOUNTER
Dea Cabrera is calling to request a refill on the following medication(s):    Last Visit Date (If Applicable):  49/60/3815    Next Visit Date:    3/1/2022    Medication Request:  Requested Prescriptions     Pending Prescriptions Disp Refills    magnesium oxide (MAG-OX) 400 (240 Mg) MG tablet [Pharmacy Med Name: MAGNESIUM OXIDE 400MG TABLET] 60 tablet 9     Sig: TAKE 1 TABLET BY MOUTH TWICE A DAY    losartan (COZAAR) 25 MG tablet [Pharmacy Med Name: LOSARTAN POTASSIUM 25MG TABLET] 30 tablet 9     Sig: TAKE 1 TABLET BY MOUTH ONCE DAILY

## 2022-02-21 ENCOUNTER — HOSPITAL ENCOUNTER (OUTPATIENT)
Age: 72
Discharge: HOME | End: 2022-02-21
Payer: COMMERCIAL

## 2022-02-21 LAB
CHOLESTEROL/HDL RATIO: 6
CHOLESTEROL: 223 MG/DL
HDLC SERPL-MCNC: 37 MG/DL
LDL CHOLESTEROL: 144 MG/DL (ref 0–130)
TRIGL SERPL-MCNC: 211 MG/DL
VLDLC SERPL CALC-MCNC: ABNORMAL MG/DL (ref 1–30)

## 2022-02-21 PROCEDURE — 80061 LIPID PANEL: CPT

## 2022-02-21 PROCEDURE — 36415 COLL VENOUS BLD VENIPUNCTURE: CPT

## 2022-02-21 RX ORDER — LANOLIN ALCOHOL/MO/W.PET/CERES
CREAM (GRAM) TOPICAL
Qty: 60 TABLET | Refills: 9 | Status: SHIPPED | OUTPATIENT
Start: 2022-02-21 | End: 2022-03-01 | Stop reason: SDUPTHER

## 2022-02-21 RX ORDER — LOSARTAN POTASSIUM 25 MG/1
TABLET ORAL
Qty: 30 TABLET | Refills: 9 | Status: SHIPPED | OUTPATIENT
Start: 2022-02-21

## 2022-02-22 ENCOUNTER — TELEPHONE (OUTPATIENT)
Dept: GASTROENTEROLOGY | Age: 72
End: 2022-02-22

## 2022-02-23 DIAGNOSIS — D64.9 ANEMIA, UNSPECIFIED TYPE: ICD-10-CM

## 2022-02-23 NOTE — TELEPHONE ENCOUNTER
Please Approve or Refuse.   Send to Pharmacy per Pt's Request:      Next Visit Date:  3/1/2022   Last Visit Date: 12/29/2021    Hemoglobin A1C (%)   Date Value   02/25/2021 7.8   09/14/2020 10.6   02/12/2020 8.4             ( goal A1C is < 7)   BP Readings from Last 3 Encounters:   02/07/22 133/77   02/01/22 (!) 146/82   10/09/21 (!) 116/56          (goal 120/80)  BUN   Date Value Ref Range Status   02/01/2022 15 8 - 23 mg/dL Final     CREATININE   Date Value Ref Range Status   02/01/2022 0.97 (H) 0.50 - 0.90 mg/dL Final     Potassium   Date Value Ref Range Status   02/01/2022 4.6 3.7 - 5.3 mmol/L Final

## 2022-02-24 DIAGNOSIS — D64.9 ANEMIA, UNSPECIFIED TYPE: ICD-10-CM

## 2022-02-24 RX ORDER — FERROUS SULFATE 325(65) MG
TABLET ORAL
Qty: 30 TABLET | Refills: 0 | Status: SHIPPED | OUTPATIENT
Start: 2022-02-24 | End: 2022-03-18

## 2022-02-24 RX ORDER — FERROUS SULFATE 325(65) MG
TABLET ORAL
Qty: 30 TABLET | Refills: 0 | OUTPATIENT
Start: 2022-02-24

## 2022-03-01 ENCOUNTER — OFFICE VISIT (OUTPATIENT)
Dept: FAMILY MEDICINE CLINIC | Age: 72
End: 2022-03-01
Payer: COMMERCIAL

## 2022-03-01 VITALS
TEMPERATURE: 96.8 F | BODY MASS INDEX: 45.38 KG/M2 | WEIGHT: 246.6 LBS | DIASTOLIC BLOOD PRESSURE: 70 MMHG | HEIGHT: 62 IN | HEART RATE: 62 BPM | OXYGEN SATURATION: 97 % | SYSTOLIC BLOOD PRESSURE: 134 MMHG

## 2022-03-01 DIAGNOSIS — I25.83 CORONARY ARTERY DISEASE DUE TO LIPID RICH PLAQUE: ICD-10-CM

## 2022-03-01 DIAGNOSIS — F41.9 ANXIETY AND DEPRESSION: ICD-10-CM

## 2022-03-01 DIAGNOSIS — G47.33 OBSTRUCTIVE SLEEP APNEA SYNDROME: ICD-10-CM

## 2022-03-01 DIAGNOSIS — Z23 NEED FOR PROPHYLACTIC VACCINATION AGAINST DIPHTHERIA-TETANUS-PERTUSSIS (DTP): ICD-10-CM

## 2022-03-01 DIAGNOSIS — Z23 NEED FOR PROPHYLACTIC VACCINATION AND INOCULATION AGAINST VARICELLA: ICD-10-CM

## 2022-03-01 DIAGNOSIS — F32.A ANXIETY AND DEPRESSION: ICD-10-CM

## 2022-03-01 DIAGNOSIS — J44.9 ASTHMA WITH COPD (HCC): ICD-10-CM

## 2022-03-01 DIAGNOSIS — Z79.4 TYPE 2 DIABETES MELLITUS WITH DIABETIC POLYNEUROPATHY, WITH LONG-TERM CURRENT USE OF INSULIN (HCC): Primary | ICD-10-CM

## 2022-03-01 DIAGNOSIS — I10 HTN (HYPERTENSION), BENIGN: ICD-10-CM

## 2022-03-01 DIAGNOSIS — I25.10 CORONARY ARTERY DISEASE DUE TO LIPID RICH PLAQUE: ICD-10-CM

## 2022-03-01 DIAGNOSIS — E11.42 TYPE 2 DIABETES MELLITUS WITH DIABETIC POLYNEUROPATHY, WITH LONG-TERM CURRENT USE OF INSULIN (HCC): Primary | ICD-10-CM

## 2022-03-01 DIAGNOSIS — E78.2 MIXED HYPERLIPIDEMIA: ICD-10-CM

## 2022-03-01 PROBLEM — I95.9 HYPOTENSION: Status: RESOLVED | Noted: 2021-10-06 | Resolved: 2022-03-01

## 2022-03-01 PROBLEM — K21.9 GASTROESOPHAGEAL REFLUX DISEASE: Status: RESOLVED | Noted: 2021-01-26 | Resolved: 2022-03-01

## 2022-03-01 PROBLEM — R09.02 HYPOXIA: Status: RESOLVED | Noted: 2021-10-06 | Resolved: 2022-03-01

## 2022-03-01 PROBLEM — N17.9 AKI (ACUTE KIDNEY INJURY) (HCC): Status: RESOLVED | Noted: 2018-02-01 | Resolved: 2022-03-01

## 2022-03-01 PROBLEM — K21.00 GASTROESOPHAGEAL REFLUX DISEASE WITH ESOPHAGITIS: Status: RESOLVED | Noted: 2018-07-16 | Resolved: 2022-03-01

## 2022-03-01 PROBLEM — R07.9 CHEST PAIN: Status: RESOLVED | Noted: 2021-04-05 | Resolved: 2022-03-01

## 2022-03-01 LAB — HBA1C MFR BLD: 12.5 %

## 2022-03-01 PROCEDURE — 1123F ACP DISCUSS/DSCN MKR DOCD: CPT | Performed by: FAMILY MEDICINE

## 2022-03-01 PROCEDURE — G8399 PT W/DXA RESULTS DOCUMENT: HCPCS | Performed by: FAMILY MEDICINE

## 2022-03-01 PROCEDURE — G8482 FLU IMMUNIZE ORDER/ADMIN: HCPCS | Performed by: FAMILY MEDICINE

## 2022-03-01 PROCEDURE — 1036F TOBACCO NON-USER: CPT | Performed by: FAMILY MEDICINE

## 2022-03-01 PROCEDURE — 3017F COLORECTAL CA SCREEN DOC REV: CPT | Performed by: FAMILY MEDICINE

## 2022-03-01 PROCEDURE — G8417 CALC BMI ABV UP PARAM F/U: HCPCS | Performed by: FAMILY MEDICINE

## 2022-03-01 PROCEDURE — 1090F PRES/ABSN URINE INCON ASSESS: CPT | Performed by: FAMILY MEDICINE

## 2022-03-01 PROCEDURE — 99214 OFFICE O/P EST MOD 30 MIN: CPT | Performed by: FAMILY MEDICINE

## 2022-03-01 PROCEDURE — 3046F HEMOGLOBIN A1C LEVEL >9.0%: CPT | Performed by: FAMILY MEDICINE

## 2022-03-01 PROCEDURE — 2022F DILAT RTA XM EVC RTNOPTHY: CPT | Performed by: FAMILY MEDICINE

## 2022-03-01 PROCEDURE — 83036 HEMOGLOBIN GLYCOSYLATED A1C: CPT | Performed by: FAMILY MEDICINE

## 2022-03-01 PROCEDURE — 3023F SPIROM DOC REV: CPT | Performed by: FAMILY MEDICINE

## 2022-03-01 PROCEDURE — 4040F PNEUMOC VAC/ADMIN/RCVD: CPT | Performed by: FAMILY MEDICINE

## 2022-03-01 PROCEDURE — G8427 DOCREV CUR MEDS BY ELIG CLIN: HCPCS | Performed by: FAMILY MEDICINE

## 2022-03-01 RX ORDER — INSULIN GLARGINE 100 [IU]/ML
55 INJECTION, SOLUTION SUBCUTANEOUS 2 TIMES DAILY
Qty: 10 ML | Refills: 2 | Status: SHIPPED | OUTPATIENT
Start: 2022-03-01

## 2022-03-01 RX ORDER — MAGNESIUM OXIDE 400 MG/1
TABLET ORAL
COMMUNITY
Start: 2022-02-21

## 2022-03-01 RX ORDER — BLOOD-GLUCOSE SENSOR
EACH MISCELLANEOUS
Qty: 1 EACH | Refills: 2 | Status: SHIPPED | OUTPATIENT
Start: 2022-03-01 | End: 2022-07-25

## 2022-03-01 RX ORDER — DULAGLUTIDE 0.75 MG/.5ML
0.75 INJECTION, SOLUTION SUBCUTANEOUS
Qty: 4 PEN | Refills: 5 | Status: SHIPPED | OUTPATIENT
Start: 2022-03-01 | End: 2022-07-08 | Stop reason: DRUGHIGH

## 2022-03-01 SDOH — ECONOMIC STABILITY: FOOD INSECURITY: WITHIN THE PAST 12 MONTHS, THE FOOD YOU BOUGHT JUST DIDN'T LAST AND YOU DIDN'T HAVE MONEY TO GET MORE.: NEVER TRUE

## 2022-03-01 SDOH — ECONOMIC STABILITY: FOOD INSECURITY: WITHIN THE PAST 12 MONTHS, YOU WORRIED THAT YOUR FOOD WOULD RUN OUT BEFORE YOU GOT MONEY TO BUY MORE.: NEVER TRUE

## 2022-03-01 ASSESSMENT — ENCOUNTER SYMPTOMS
ABDOMINAL DISTENTION: 0
ABDOMINAL PAIN: 0
COLOR CHANGE: 0
BACK PAIN: 1
VOMITING: 0
WHEEZING: 0
SINUS PRESSURE: 0
COUGH: 0
SHORTNESS OF BREATH: 0
CONSTIPATION: 0
CHEST TIGHTNESS: 0

## 2022-03-01 ASSESSMENT — PATIENT HEALTH QUESTIONNAIRE - PHQ9
SUM OF ALL RESPONSES TO PHQ QUESTIONS 1-9: 2
SUM OF ALL RESPONSES TO PHQ9 QUESTIONS 1 & 2: 2
2. FEELING DOWN, DEPRESSED OR HOPELESS: 1
1. LITTLE INTEREST OR PLEASURE IN DOING THINGS: 1
SUM OF ALL RESPONSES TO PHQ QUESTIONS 1-9: 2

## 2022-03-01 ASSESSMENT — SOCIAL DETERMINANTS OF HEALTH (SDOH): HOW HARD IS IT FOR YOU TO PAY FOR THE VERY BASICS LIKE FOOD, HOUSING, MEDICAL CARE, AND HEATING?: NOT HARD AT ALL

## 2022-03-01 NOTE — PROGRESS NOTES
Chief Complaint   Patient presents with    Diabetes    Fatigue    Other     CRAMPS    Discuss Medications     [address]  here today for follow up on chronic medical problems, go over labs and/or diagnostic studies, and medication refills. Diabetes, Fatigue, Other (CRAMPS), and Discuss Medications (CELEXA)      HPI: Patient is scheduled for chronic medical problems problems, not seen since last.  Patient missed multiple appointments. Diabetes uncontrolled A1c has increased to 12.1, patient reports she is compliant with her medications and also checks her blood sugars 3-4 times in a day. She also takes short-acting insulin. Patient reports her sugars are running more than 250. She had home health in the past and he stopped. Patient reports she feels fatigue and tired has less energy is not able to do her daily ADLs. Patient reports she has a friend coming home changed her sheets and also help with cleaning. Patient does not have any nursing aide to help with her ADLs. Patient wants continuous glucose monitor reports that will help her, she has to poke multiple times during the day. Hypertension controlled, denies any chest pain shortness of breath. Patient has chronic history of chest pain which is stable follows with cardiologist.  Atrial fibrillation heart rate is under control. Coronary disease stable on current treatment is compliant with all medications. Obstructive sleep apnea on CPAP. Also on home oxygen reports that is helping. Hyperlipidemia uncontrolled with high triglycerides no recent blood work. Patient is on multiple medications. Asthma with COPD, patient is on inhalers and also on home oxygen. History of anxiety and depression was taking Celexa 40 mg which was decreased to 20 mg because of chest pains, discussed with change in dose of Celexa.           /70   Pulse 62   Temp 96.8 °F (36 °C)   Ht 5' 2\" (1.575 m)   Wt 246 lb 9.6 oz (111.9 kg)   LMP  (LMP Unknown)   SpO2 97%   BMI 45.10 kg/m²    Body mass index is 45.1 kg/m². Wt Readings from Last 3 Encounters:   03/01/22 246 lb 9.6 oz (111.9 kg)   02/17/22 250 lb (113.4 kg)   10/05/21 250 lb (113.4 kg)        []Negative depression screening. PHQ Scores 3/1/2022 2/25/2021 6/25/2020 9/24/2019 3/7/2019 2/14/2018 7/15/2015   PHQ2 Score 2 0 1 1 2 0 0   PHQ9 Score 2 0 1 1 2 0 0      [x]1-4 = Minimal depression   []5-9 = Milddepression   []10-14 = Moderate depression   []15-19 = Moderately severe depression   []20-27 = Severe depression    Discussed testing with the patient and all questions fully answered. Hospital Outpatient Visit on 02/21/2022   Component Date Value Ref Range Status    Cholesterol 02/21/2022 223* <200 mg/dL Final    Comment:    Cholesterol Guidelines:      <200  Desirable   200-240  Borderline      >240  Undesirable         HDL 02/21/2022 37* >40 mg/dL Final    Comment:    HDL Guidelines:    <40     Undesirable   40-59    Borderline    >59     Desirable         LDL Cholesterol 02/21/2022 144* 0 - 130 mg/dL Final    Comment:    LDL Guidelines:     <100    Desirable   100-129   Near to/above Desirable   130-159   Borderline      >159   Undesirable     Direct (measured) LDL and calculated LDL are not interchangeable tests.  Chol/HDL Ratio 02/21/2022 6.0* <5 Final            Triglycerides 02/21/2022 211* <150 mg/dL Final    Comment:    Triglyceride Guidelines:     <150   Desirable   150-199  Borderline   200-499  High     >499   Very high   Based on AHA Guidelines for fasting triglyceride, October 2012.          VLDL 02/21/2022 NOT REPORTED* 1 - 30 mg/dL Final         Most recent labs reviewed:     Lab Results   Component Value Date    WBC 8.3 02/01/2022    HGB 14.6 02/01/2022    HCT 45.8 02/01/2022    MCV 97.7 02/01/2022    PLT See Reflexed IPF Result 02/01/2022       @BRIEFLAB(NA,K,CL,CO2,BUN,CREATININE,GLUCOSE,CALCIUM)@     Lab Results   Component Value Date ALT 27 10/13/2021    AST 31 10/13/2021    ALKPHOS 60 10/13/2021    BILITOT 0.23 (L) 10/13/2021       Lab Results   Component Value Date    TSH 2.14 06/30/2020       Lab Results   Component Value Date    CHOL 223 (H) 02/21/2022    CHOL 302 (H) 02/25/2021    CHOL 264 (H) 07/02/2020     Lab Results   Component Value Date    TRIG 211 (H) 02/21/2022    TRIG 289 (H) 02/25/2021    TRIG 340 (H) 07/02/2020     Lab Results   Component Value Date    HDL 37 (L) 02/21/2022    HDL 37 (L) 02/25/2021    HDL 34 (L) 07/02/2020     Lab Results   Component Value Date    LDLCHOLESTEROL 144 (H) 02/21/2022    LDLCHOLESTEROL 207 (H) 02/25/2021    LDLCHOLESTEROL 162 (H) 07/02/2020     Lab Results   Component Value Date    VLDL NOT REPORTED (H) 02/21/2022    VLDL NOT REPORTED (H) 02/25/2021    VLDL NOT REPORTED (H) 07/02/2020     Lab Results   Component Value Date    CHOLHDLRATIO 6.0 (H) 02/21/2022    CHOLHDLRATIO 8.2 (H) 02/25/2021    CHOLHDLRATIO 7.8 (H) 07/02/2020       Lab Results   Component Value Date    LABA1C 12.5 03/01/2022       Lab Results   Component Value Date    MXJAFGHJ72 386 10/08/2021       Lab Results   Component Value Date    FOLATE 15.4 10/08/2021       Lab Results   Component Value Date    IRON 47 (L) 02/23/2012    TIBC 377 02/23/2012    FERRITIN 91 04/22/2020       Lab Results   Component Value Date    VITD25 14.5 (L) 10/19/2017             Current Outpatient Medications   Medication Sig Dispense Refill    magnesium oxide (MAG-OX) 400 MG tablet       Tdap (ADACEL) 5-2-15.5 LF-MCG/0.5 injection Inject 0.5 mLs into the muscle once for 1 dose 0.5 mL 0    zoster recombinant adjuvanted vaccine (SHINGRIX) 50 MCG/0.5ML SUSR injection 50 MCG IM then repeat 2-6 months.  0.5 mL 1    insulin glargine (BASAGLAR KWIKPEN) 100 UNIT/ML injection pen Inject 55 Units into the skin 2 times daily 10 mL 2    Dulaglutide (TRULICITY) 2.23 VK/5.8SD SOPN Inject 0.75 mg into the skin every 7 days If pen needle is needed, please request 4 pen 5    Continuous Blood Gluc Sensor (DEXCOM G6 SENSOR) MISC Use daily for checking blood sugars 1 each 2    ferrous sulfate (IRON 325) 325 (65 Fe) MG tablet TAKE 1 TAB BY MOUTH IN THE MORNING 30 tablet 0    oxyCODONE-acetaminophen (PERCOCET) 5-325 MG per tablet Take 1 tablet by mouth See Admin Instructions for 30 days. Intended supply: 30 days. 1 tab 3-4 times a day prn 100 tablet 0    ibuprofen (ADVIL;MOTRIN) 600 MG tablet Take 1 tablet by mouth 3 times daily as needed for Pain 30 tablet 0    carvedilol (COREG) 3.125 MG tablet TAKE 1 TAB BY MOUTH TWICE A DAY ( IN THE MORNING AND BEDTIME ) 180 tablet 3    ketoconazole (NIZORAL) 2 % cream Apply topically  2 times a day.  1 each 0    oxybutynin (DITROPAN XL) 5 MG extended release tablet Take 1 tablet by mouth daily 30 tablet 3    citalopram (CELEXA) 20 MG tablet TAKE 1 TABLET BY MOUTH DAILY NEEDS TO DECREASE THE CELEXA TO 20 MG DUE TO SIDE EFFECTS ON THE HEART 30 tablet 5    aspirin 81 MG EC tablet TAKE 1 TABLET BY MOUTH ONCE DAILY 90 tablet 1    pantoprazole (PROTONIX) 40 MG tablet TAKE 1 TABLET BY MOUTH TWICE A DAY 60 tablet 5    docusate sodium (COLACE) 100 MG capsule TAKE 1 CAPSULE BY MOUTH TWICE A DAY 60 capsule 5    Multiple Vitamins-Minerals (CERTAVITE/ANTIOXIDANTS) TABS TAKE 1 TABLET BY MOUTH ONCE DAILY 30 tablet 5    isosorbide dinitrate (ISORDIL) 30 MG tablet TAKE 1 TABLET BY MOUTH ONCE DAILY 30 tablet 5    vitamin B-12 (CYANOCOBALAMIN) 100 MCG tablet take half a tablet BY MOUTH ONCE DAILY 30 tablet 0    furosemide (LASIX) 20 MG tablet TAKE 1 TABLET BY MOUTH ONCE DAILY AS NEEDED 30 tablet 0    Icosapent Ethyl (VASCEPA) 1 g CAPS capsule TAKE 1 CAPSULE BY MOUTH TWICE A DAY 60 capsule 0    dicyclomine (BENTYL) 10 MG capsule TAKE 1 CAPSULE BY MOUTH TWICE A DAY AS NEEDED FOR ABDOMINAL SPASMS 60 capsule 0    fenofibrate (TRIGLIDE) 160 MG tablet Take 1 tablet by mouth daily 90 tablet 1    topiramate (TOPAMAX) 100 MG tablet TAKE 1 TABLET BY MOUTH NIGHTLY  90 tablet 2    clopidogrel (PLAVIX) 75 MG tablet TAKE 1 TAB BY MOUTH ONCE A DAY ( IN THE MORNING ) -THIS MEDICINE MAY BE TAKENWITH OR WITHOUT FOOD  90 tablet 1    metFORMIN (GLUCOPHAGE) 1000 MG tablet TAKE 1 TAB BY MOUTH TWICE A DAY ( IN THE MORNING AND BEDTIME )  180 tablet 3    blood glucose test strips (ONETOUCH ULTRA) strip TEST TWO (2) TO THREE (3) TIMES A DAY & AS NEEDED FOR SYMPTOMS OF IRREGULAR BLOOD GLUCOSE 100 strip 0    blood glucose test strips (ONETOUCH ULTRA) strip TEST TWO (2) TO THREE (3) TIMES A DAY & AS NEEDED FOR SYMPTOMS OF IRREGULAR BLOOD GLUCOSE 100 strip 0    Blood Glucose Monitoring Suppl (ONE TOUCH ULTRA 2) w/Device KIT       ONETOUCH ULTRA strip TEST TWO (2) TO THREE (3) TIMES A DAY& AS NEEDED  100 each 0    insulin aspart (NOVOLOG FLEXPEN) 100 UNIT/ML injection pen IF <139 - NO INSULIN; 140-199-2 UNITS;200-249-4 UNITS;250-299-6 UNITS;300-349-8 UNITS;350-400=10 UNITS;ABOVE 400-12 UNITS 15 mL 3    atorvastatin (LIPITOR) 40 MG tablet Take 1 tablet by mouth daily 90 tablet 3    BiPAP Machine MISC repair/replace Bipap maintain 18/9CMH2O, Cflex=3,mask,tubing,filters,headgear,heated humidifier,all supplies PRN      Psyllium (METAMUCIL PO) Take by mouth 3 times daily Takes powder      blood glucose test strips (TRUE METRIX BLOOD GLUCOSE TEST) strip THREE TIMES A  each 3    albuterol (PROVENTIL) (2.5 MG/3ML) 0.083% nebulizer solution Take 3 mLs by nebulization every 6 hours as needed for Wheezing 120 each 3    ULTICARE MINI PEN NEEDLES 31G X 6 MM MISC USE AS DIRECTED  100 each 5    nystatin (MYCOSTATIN) 311696 UNIT/GM powder Apply 3 times daily. 3 Bottle 3    lidocaine (LMX) 4 % cream Apply topically every 8 hrs as needed for pain 45 g 3    Lift Chair MISC by Does not apply route 1 each 0    Misc.  Devices (ADJUST BATH/SHOWER SEAT/BACK) MISC Use daily for shower 1 each 0    Alcohol Swabs (B-D SINGLE USE SWABS REGULAR) PADS THREE TIMES A  each 0    nitroGLYCERIN (NITROSTAT) 0.4 MG SL tablet 1 under the tongue as needed for angina, may repeat q5mins for up three doses      Blood Glucose Monitoring Suppl HARMEET Use daily to check BS 1 Device 0    ipratropium-albuterol (DUONEB) 0.5-2.5 (3) MG/3ML SOLN nebulizer solution Inhale 3 mLs into the lungs every 4 hours 360 mL 2    Melatonin 10 MG TABS Take 10 mg by mouth nightly 90 tablet 3    Compression Stockings MISC by Does not apply route Pressure between 20 - 25 . 1 each 0    SYMBICORT 160-4.5 MCG/ACT AERO   2 puffs As needed for sob      OXYGEN Inhale 3 L into the lungs       losartan (COZAAR) 25 MG tablet TAKE 1 TABLET BY MOUTH ONCE DAILY 30 tablet 9    gabapentin (NEURONTIN) 300 MG capsule Take 1 capsule by mouth 3 times daily for 30 days. (Patient taking differently: Take 300 mg by mouth 2 times daily. ) 90 capsule 2     No current facility-administered medications for this visit.              Social History     Socioeconomic History    Marital status: Legally      Spouse name: Not on file    Number of children: Not on file    Years of education: Not on file    Highest education level: Not on file   Occupational History    Occupation: disabled     Employer: N/A   Tobacco Use    Smoking status: Former Smoker     Packs/day: 3.00     Years: 41.00     Pack years: 123.00     Types: Cigarettes     Quit date: 2000     Years since quittin.1    Smokeless tobacco: Never Used    Tobacco comment: quit in    Vaping Use    Vaping Use: Never used   Substance and Sexual Activity    Alcohol use: No     Alcohol/week: 0.0 standard drinks    Drug use: No    Sexual activity: Not Currently   Other Topics Concern    Not on file   Social History Narrative    Not on file     Social Determinants of Health     Financial Resource Strain: Low Risk     Difficulty of Paying Living Expenses: Not hard at all   Food Insecurity: No Food Insecurity    Worried About [address] in the Last Year: Never true    Ran Out of Food in the Last Year: Never true   Transportation Needs:     Lack of Transportation (Medical): Not on file    Lack of Transportation (Non-Medical): Not on file   Physical Activity:     Days of Exercise per Week: Not on file    Minutes of Exercise per Session: Not on file   Stress:     Feeling of Stress : Not on file   Social Connections:     Frequency of Communication with Friends and Family: Not on file    Frequency of Social Gatherings with Friends and Family: Not on file    Attends Restorationist Services: Not on file    Active Member of [address] [place] [address] [employer] or Organizations: Not on file    Attends Club or Organization Meetings: Not on file    Marital Status: Not on file   Intimate Partner Violence:     Fear of Current or Ex-Partner: Not on file    Emotionally Abused: Not on file    Physically Abused: Not on file    Sexually Abused: Not on file   Housing Stability:     Unable to Pay for Housing in the Last Year: Not on file    Number of [place] in the Last Year: Not on file    Unstable Housing in the Last Year: Not on file     Counseling given: Not Answered  Comment: quit in 2000        Family History   Problem Relation Age of Onset    Diabetes Mother     Heart Disease Mother     Stomach Cancer Father     Diabetes Maternal Grandmother         also aunts and uncles (maternal)    Emphysema Sister              -rest of complaints with corresponding details per ROS    The patient's past medical, surgical, social, and family history as well as her current medications and allergies were reviewed as documented intoday's encounter. Review of Systems   Constitutional: Positive for activity change and fatigue. Negative for appetite change, diaphoresis, fever and unexpected weight change. HENT: Negative for congestion, drooling and sinus pressure. Eyes: Positive for visual disturbance. Respiratory: Negative for cough, chest tightness, shortness of breath and wheezing. Cardiovascular: Negative for chest pain, palpitations and leg swelling. Gastrointestinal: Negative for abdominal distention, abdominal pain, constipation and vomiting. Endocrine: Negative for polyuria. Genitourinary: Positive for urgency. Negative for difficulty urinating, flank pain, frequency and hematuria. Musculoskeletal: Positive for arthralgias, back pain, gait problem, joint swelling and myalgias. Negative for neck pain and neck stiffness. Skin: Negative for color change and wound. Neurological: Positive for weakness and numbness. Negative for dizziness, speech difficulty and light-headedness. Psychiatric/Behavioral: Positive for dysphoric mood. Negative for agitation, decreased concentration, hallucinations, sleep disturbance and suicidal ideas. The patient is nervous/anxious. The patient is not hyperactive. Physical Exam  Vitals and nursing note reviewed. HENT:      Head: Normocephalic and atraumatic. Nose: Nose normal.      Mouth/Throat:      Mouth: Mucous membranes are dry. Eyes:      Extraocular Movements: Extraocular movements intact. Pupils: Pupils are equal, round, and reactive to light. Cardiovascular:      Rate and Rhythm: Normal rate and regular rhythm. Heart sounds: Normal heart sounds. Pulmonary:      Effort: Pulmonary effort is normal. No respiratory distress. Breath sounds: Decreased air movement present. No stridor. Decreased breath sounds present. No wheezing or rhonchi. Abdominal:      General: Bowel sounds are normal. There is no distension. Palpations: Abdomen is soft. There is no mass. Tenderness: There is no abdominal tenderness. Hernia: No hernia is present. Musculoskeletal:      Cervical back: No deformity or tenderness. Decreased range of motion. Thoracic back: Deformity and spasms present. Normal range of motion. Lumbar back: Deformity, spasms and tenderness present. Decreased range of motion. Positive right straight leg raise test. Negative left straight leg raise test.      Right lower le+ Edema present. Left lower le+ Edema present. Lymphadenopathy:      Cervical: No cervical adenopathy. Skin:     General: Skin is warm. Neurological:      Mental Status: She is alert and oriented to person, place, and time. Cranial Nerves: No cranial nerve deficit. Motor: No weakness. Gait: Gait is intact. Gait normal.   Psychiatric:         Attention and Perception: Attention and perception normal.         Mood and Affect: Mood is anxious. Mood is not depressed. Speech: She is communicative. Behavior: Behavior normal. Behavior is not agitated or slowed. ASSESSMENT AND PLAN      1. Type 2 diabetes mellitus with diabetic polyneuropathy, with long-term current use of insulin (HCC)  Uncontrolled discussed with patient be compliant with appointments, Dexcom ordered may benefit from continuous glucose monitor, increase insulin to 55 units start on Trulicity will do home health monitoring  - POCT glycosylated hemoglobin (Hb A1C)  - zoster recombinant adjuvanted vaccine (SHINGRIX) 50 MCG/0.5ML SUSR injection; 50 MCG IM then repeat 2-6 months. Dispense: 0.5 mL; Refill: 1  - CBC with Auto Differential; Future  - Microalbumin / Creatinine Urine Ratio; Future  - [address]  - insulin glargine [facility]) 100 UNIT/ML injection pen; Inject 55 Units into the skin 2 times daily  Dispense: 10 mL; Refill: 2  - Dulaglutide (TRULICITY) 7.00 UQ/7.2WI SOPN; Inject 0.75 mg into the skin every 7 days If pen needle is needed, please request  Dispense: 4 pen; Refill: 5  - Continuous Blood Gluc Sensor (DEXCOM G6 SENSOR) MISC; Use daily for checking blood sugars  Dispense: 1 each; Refill: 2    2. HTN (hypertension), benign  Controlled continue same medications monitor blood pressure at home    3.  Coronary artery disease due to lipid rich Encounter   Medication Reason    magnesium oxide (MAG-OX) 400 (240 Mg) MG tablet DUPLICATE    insulin glargine (BASAGLAR KWIKPEN) 100 UNIT/ML injection pen        Mariangel received counseling on the following healthy behaviors: nutrition, exercise and medication adherence  Reviewed prior labs and health maintenance  Continue current medications, diet and exercise. Discussed use, benefit, and side effects of prescribed medications. Barriers to medication compliance addressed. Patient given educational materials - see patient instructions  Was a self-tracking handout given in paper form or via [employer]? Yes    Requested Prescriptions     Signed Prescriptions Disp Refills    Tdap (ADACEL) 5-2-15.5 LF-MCG/0.5 injection 0.5 mL 0     Sig: Inject 0.5 mLs into the muscle once for 1 dose    zoster recombinant adjuvanted vaccine (SHINGRIX) 50 MCG/0.5ML SUSR injection 0.5 mL 1     Si MCG IM then repeat 2-6 months.  insulin glargine (BASAGLAR KWIKPEN) 100 UNIT/ML injection pen 10 mL 2     Sig: Inject 55 Units into the skin 2 times daily    Dulaglutide (TRULICITY) 3.50 CB/8.5UD SOPN 4 pen 5     Sig: Inject 0.75 mg into the skin every 7 days If pen needle is needed, please request    Continuous Blood Gluc Sensor (DEXCOM G6 SENSOR) MISC 1 each 2     Sig: Use daily for checking blood sugars       All patient questions answered. Patient voiced understanding. Quality Measures    Body mass index is 45.1 kg/m². Elevated. Weight control planned discussed daily exercise regimen and Healthy diet and regular exercise. BP: 134/70. Blood pressure is normal. Treatment plan consists of Weight Reduction, DASH Eating Plan, Dietary Sodium Restriction, Increased Physical Activity and No treatment change needed.     Fall Risk 3/1/2022 2020 3/7/2019 2018 2017 7/15/2015 2015   2 or more falls in past year? no no no no no no no   Fall with injury in past year? no no no no no no no     The patient does not have a history of falls. I did not -Patient is on wheelchair , complete a risk assessment for falls. A plan of care for falls No Treatment plan indicated    Lab Results   Component Value Date    LDLCHOLESTEROL 144 (H) 02/21/2022    (goal LDL reduction with dx if diabetes is 50% LDL reduction)    PHQ Scores 3/1/2022 2/25/2021 6/25/2020 9/24/2019 3/7/2019 2/14/2018 7/15/2015   PHQ2 Score 2 0 1 1 2 0 0   PHQ9 Score 2 0 1 1 2 0 0     Interpretation of Total Score Depression Severity: 1-4 = Minimal depression, 5-9 = Mild depression, 10-14 = Moderate depression, 15-19 = Moderately severe depression, 20-27 = Severe depression      The patient'spast medical, surgical, social, and family history as well as her   current medications and allergies were reviewed as documented in today's encounter. Medications, labs, diagnostic studies, consultations andfollow-up as documented in this encounter. Return in about 3 months (around 6/1/2022) for dm ,htn, hld. Patient wasseen with total face to face time of 35 minutes. More than 50% of this visit was counseling and education. Future Appointments   Date Time Provider Clarence Mas   3/9/2022 10:00 AM VERONIKA Campbell - CNP [address]   5[facility]2022 10:00 [facility]   5/5/2022 10:15 AM Kushal Brink DPM [facility]   6/2/2022 10:00 AM Edna Gibson MD [facility]     This note was completed by using the assistance of a speech-recognition program. However, inadvertent computerized transcription errors may be present. Althoughevery effort was made to ensure accuracy, no guarantees can be provided that every mistake has been identified and corrected by editing.   Electronically signed by Edna Gibson MD on 3/1/2022  11:06 AM

## 2022-03-01 NOTE — PROGRESS NOTES
Visit Information    Have you changed or started any medications since your last visit including any over-the-counter medicines, vitamins, or herbal medicines? no   Have you stopped taking any of your medications? Is so, why? -  no  Are you having any side effects from any of your medications? - no    Have you seen any other physician or provider since your last visit? yes -    Have you had any other diagnostic tests since your last visit?  no   Have you been seen in the emergency room and/or had an admission in a hospital since we last saw you?  yes -    Have you had your routine dental cleaning in the past 6 months?  no     Do you have an active MyChart account? If no, what is the barrier?   Yes    Patient Care Team:  Nasir Bermudez MD as PCP - General (Family Medicine)  Nasir Bermudez MD as PCP - [facility]  Cristian Westfall MD as Consulting Physician (Urology)  Tosin Peter MD as Consulting Physician (Pulmonology)  Rafael Arauz (Optometry)  VERONIKA Escamilla CNP as Nurse Practitioner (Certified Nurse Practitioner)    Medical History Review  Past Medical, Family, and Social History reviewed and does contribute to the patient presenting condition    Health Maintenance   Topic Date Due    Depression Monitoring  Never done    DTaP/Tdap/Td vaccine (1 - Tdap) Never done    Shingles Vaccine (1 of 2) Never done    Diabetic retinal exam  01/30/2021    COVID-19 Vaccine (3 - Booster for Dinah Coup series) 08/10/2021    Annual Wellness Visit (AWV)  Never done    A1C test (Diabetic or Prediabetic)  02/25/2022    Breast cancer screen  11/17/2022    Potassium monitoring  02/01/2023    Creatinine monitoring  02/01/2023    Diabetic foot exam  02/17/2023    Lipid screen  02/21/2023    Colorectal Cancer Screen  03/02/2024    DEXA (modify frequency per FRAX score)  Completed    Flu vaccine  Completed    Pneumococcal 65+ years Vaccine  Completed    Hepatitis C screen  Completed    Hepatitis A vaccine  Aged Out    Hib vaccine  Aged Out    Meningococcal (ACWY) vaccine  Aged Out

## 2022-03-04 ENCOUNTER — TELEPHONE (OUTPATIENT)
Dept: FAMILY MEDICINE CLINIC | Age: 72
End: 2022-03-04

## 2022-03-04 NOTE — TELEPHONE ENCOUNTER
Faxed over script for dexcom , along with pg notes to PME, ZOFIA berman Will scan in script, along with pg notes and confirmation. Place in pile with other confirmations.

## 2022-03-08 NOTE — PROGRESS NOTES
Physical Therapy Evaluation Entered On:  1/8/2019 11:10     Performed On:  1/8/2019 10:33  by Hellen Lange   PT Evaluation 45 min high complexity :   1    PT Therapeutic Activities Each 15 Min :   1    LangeZenon ferriscy - 1/8/2019 11:44    PT Patient History   Therapy Benefits/Risks Discussed :   Yes   Precautions :   Falls/safety     Patient Goal :   none stated; family agreeable to plan of care   PT Diagnosis :   Impaired functional mobiity associated with CVA   Patient's Prior Level of Function Per :   Family   Nazario Hellen - 1/8/2019 11:15    Chart/Pertinent Medical Hx Reviewed :   Yes   PT Orders :   PT eval and treat; stroke protocol  Pt. 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; CVA   Living Arrangements :   Lives with family   (Comment:  [Hellen Lange - 1/8/2019 12:12 ] )   Living Environment :   Two-story house   Hellen Lange - 1/8/2019 11:44    Home Stairs Grid   Number of Stairs to Enter Home :    2               Entry Stairs Railing :    No railing              Number of Stairs within Home :    16               Railing on Stairs in Home :    Left side  (Comment: part with left rail, part with right rail [Hellen Lange - 1/8/2019 12:12 ] )             Comment  (Comment: bedroom and full bath on 2nd floor [Hellen Lange - 1/8/2019 12:12 ] )         Hellen Lange - 1/8/2019 11:44          Transfers :   Assisted   (Comment: assist from  for bathroom transfers for last several days prior to admission; otherwise completing transfers independently [Hellen Lange - 1/8/2019 12:12 ] )   Locomotion :   Independent without an assistive device   Distance :   Household   PT Assistive Device :   Straight cane   Mobility Comments :   Tub shower, shower chair, standard height toilets.  Prior to admission, pt. independent with dressing, bathing.  Family assisting  Snehal [address] PROGRESS NOTE      Patient  completed []  video visit   []   phone call:         Minutes :   [x] in person visit to pain clinic    [x]    to  review Medication Agreement    []  Follow up after procedure   []  Discuss treatment options      Location:  Provider:  working from    []    home    [x]   [facility] - KELLEE VILLANUEVA ,   patient at   [x] pain clinic     []  home         Chief Complaint:  Low back pain    She c/po low back pain which  Radiates down her legs. she reports her pain has not changed. No history of lumbar surgery. , She had lumbar MRI done in 10- which read as \"Right paracentral disc extrusion L4-5 narrowing the right lateral recess. \"She states blood sugars are elevated around 300. She will be having stress test next week as she has been having chest pain. Back Pain  This is a chronic problem. The current episode started more than 1 year ago. The problem occurs constantly. The problem is unchanged. The pain is present in the lumbar spine. The quality of the pain is described as aching. Radiates to: legs. The pain is at a severity of 7/10. The pain is moderate. The pain is worse during the night. Exacerbated by: certain positions. Associated symptoms include numbness. Risk factors include menopause and obesity. She has tried analgesics (rest) for the symptoms.                      Treatment goals:  Functional status: get rid of leg pain      Aberrancy:   Any alcoholic beverages    no        Any illegal drugs no       Analgesia:        7             Adverse  Effects :nio      ADL;s : exercises      Data:    When was thelast UDS:  2-7-22          Was the UDS appropriate:  [x] yes []   no      Record/Diagnostics Review:      As above, I did review the imaging       DRUG SCREEN, PAIN  Order: [id number]   Status: Final result     Visible to patient: Yes (not seen)     Next appt: 03/09/2022 at 10:00 AM in Pain Management VERONIKA Pa CNP)     0 Result Notes    Component Ref Range & Units 2/7/22 0955 2/26/21 1354 1/6/20 1140 7/16/19 1000 8/7/18 1110 6/8/18 0945 9/26/17 1130   Pain Management Drug Panel Interp, Urine  Consistent ARUP  Consistent CMARUP  Consistent CM  Consistent CMARUP   Consistent CMARUP  Consistent CM    Comment: (NOTE)   ________________________________________________________________   DRUGS EXPECTED:   OXYCODONE [2/7/22]   PERCOCET (OXYCODONE) [2/7/22]   ________________________________________________________________   CONSISTENT with medications provided:   OXYCODONE : based on oxycodone, noroxycodone, oxymorphone   PERCOCET (OXYCODONE) : based on oxycodone, noroxycodone,   oxymorphone   ________________________________________________________________   Drugs Not Included in this Assay:   Acetaminophen   ________________________________________________________________   INTERPRETIVE INFORMATION: Targeted drug profile Interp   Interpretation depends on accuracy and completeness of patient   medication information submitted by client. 6-Acetylmorphine, Ur  Not Detected ARUP  Not Detected              EXAMINATION:   MRI OF THE LUMBAR SPINE WITHOUT AND WITH CONTRAST  10/23/2019 5:48 pm       TECHNIQUE:   Multiplanar multisequence MRI of the lumbar spine was performed without and   with the administration of intravenous contrast.       COMPARISON:   MRI lumbar spine 01/08/2018       HISTORY:   ORDERING SYSTEM PROVIDED HISTORY:   TECHNOLOGIST PROVIDED HISTORY:   back pain, weakness, epidural injection 5 weeks ago       FINDINGS:   BONES/ALIGNMENT: There is normal alignment of the spine. The vertebral body   heights are maintained. The bone marrow signal appears unremarkable.    Multilevel Schmorl's nodes identified involving the lower thoracic and upper   lumbar spine.       SPINAL CORD:  The conus terminates normally.       SOFT TISSUES: No abnormal enhancement is seen of the lumbar spine.  No   paraspinal mass identified.       L1-L2: Minimal with cooking, cleaning. Family assisting with car transfer.  Holding onto arm of family member for ambulation for appts   (Comment: Prior to diagnosis of metastatic lung CA with mets, CVA, L LE DVT and PE approximately 5-6 weeks ago, pt. independent with ADLs, IADLs; pt. recently requiring more assist for ADLs, mobility [Hellen Lange - 1/8/2019 12:12 ] )   Hellen Lange - 1/8/2019 11:44    Patient Assessment   Areas of skin breakdown :   small wound L heel region   Hellen Lange - 1/8/2019 11:15    Orientation :   Unable to assess   (Comment: appears oriented to self but minimally verbal throughout evaluation; family provided all information regarding home set up and prior level of function; pt. following approximately 50% of commands for R UE/LE motion [Hellen Lange - 1/8/2019 12:12 ] )   Orientation Comments :   Lethargic   (Comment: pt. minimally verbal; opening eyes partially only [Hellen Lange - 1/8/2019 12:12 ] )   Visual Fields :   Impaired   (Comment: pt's family reports vision was intact prior with glasses; unable to assess currently but appears impaired; pt. keeping head turned to right, not able to turn head to left with cueing [Hellen Lange - 1/8/2019 12:12 ] )   Visual Aids :   Glasses   Hearing Deficit :   No   Pain Symptoms :   No   (Comment: unable to assess [Hellen Lange - 1/8/2019 12:12 ] )   Hellen Lange - 1/8/2019 11:44    Vital Signs Grid     Systolic Blood Pressure  Diastolic Blood Pressure  Heart Rate  Oxygen Saturation    Pre-Activity :    131    75    97    100        Hellen Lange - 1/8/2019 11:44   Hellen Lange - 1/8/2019 11:44   Hellen Lange - 1/8/2019 11:44   Hellen Lange - 1/8/2019 11:44       Oxygen Therapy :   Nasal cannula   Patient's Equipment :   IV line - right UE, Telemetry monitor, Blood pressure cuff, Pulse oximetry, Other: external catheter   Hellen Lange - 1/8/2019 11:44    VTE Prevention Equipment Grid   SCDs :   Bilateral LE   Hellen Lange  1/8/2019 11:44    Patient Safety Equipment :   Bed siderails up, Bed check alarm engaged   PT Skin Integrity Factors :   Hospital bed   Hellen Lange - 1/8/2019 11:44    Musculoskeletal Assessment   PT ROM Grid     Hip, Left  Hip, Right  Knee, Left  Knee, Right    Strength* :    0/5   2+/5   0/5   3-/5     AAROM :       Within normal limits      Within normal limits     PROM :    Within normal limits      Within normal limits          Hellen Lange - 1/8/2019 11:15   Nazario, Hellen - 1/8/2019 11:15   Hellen Lange - 1/8/2019 11:15   Hellen Lange - 1/8/2019 11:15         Ankle, Left  Ankle, Right        Strength* :    0/5   2+/5           AAROM :       Within normal limits           PROM :    Minimal LOM                Hellen Lange - 1/8/2019 11:15   Hellen Lange - 1/8/2019 11:15         Comments and Special Tests :   See OT eval for UE assessment; L UE flaccid  L LE flaccid  Difficulty following commands for R LE but volitional movement observed; strength R LE greater than 2+/5  Pt. not turning to L with cueing; significant L neglect     Muscle Tone :   Decreased   (Comment: flaccid throughout L UE, L hip/knee; increased tone L ankle [Zenon Langecy - 1/8/2019 12:12 ] )   Sensation/Proprioception :   Impaired   (Comment: unable to fully assess but appears impaired entire L side [Zenon Langecy - 1/8/2019 12:12 ] )   Coordination :   Impaired   Hellen Lange - 1/8/2019 11:15    Wheelchair Mobility   Rolling to Left :   Dependent   (Comment: dep x 2 [Nazario Hellen - 1/8/2019 12:12 ] )   Rolling to Right :   Dependent   (Comment: dep x 2 [Nazario Hellen - 1/8/2019 12:12 ] )   Supine to Sitting :   Dependent   (Comment: dep x 2 [Zenon Langecy - 1/8/2019 12:12 ] )   Sitting to Supine :   Dependent   (Comment: dep x 2 [Zenon Langecy - 1/8/2019 12:12 ] )   (Comment: pt. sat edge of bed approximately 8 minutes dep x 1 [Zenon Langecy - 1/8/2019 12:12 ] )   Endurance/Activity Level :   Poor   Endurance/Activity  degenerative loss of disc space height and signal.  There is   no significant disc protrusion, spinal canal stenosis or neural foraminal   narrowing.       L2-L3: Moderate disc space is identified with mild circumferential disc   bulge.  Minimal central canal stenosis.  Mild facet arthropathy.  No central   foramina.  No focal disc protrusion.       L3-L4: Mild disc space disease identified with circumferential disc bulge. There is mild right and moderate left neural foraminal narrowing.  Moderate   degenerate facet arthropathy.  Mild central canal stenosis.       L4-L5: Mild disc space disease identified with circumferential disc bulge. There is a right paracentral disc extrusion which extends caudally 1.2 cm and   effaces the right lateral recess affecting the traversing right L5 nerve   root.  Otherwise mild neural foraminal narrowing.  No central canal stenosis. Moderate facet arthropathy.       L5-S1: Minimal degenerative loss of disc space height and signal.  No focal   disc protrusion.  Minimal neural foraminal narrowing.  Mild-to-moderate facet   arthropathy.           Impression   Right paracentral disc extrusion L4-5 narrowing the right lateral recess. Please correlate with possible right L5 radiculopathy.  This is new from   previous MRI.       Otherwise mild degenerate change.       No evidence of discitis osteomyelitis or epidural abscess.                             Pill count: appropriate    fill date :3-11-22  # 6 percocet tabs    Morphine equivalent dose as reported on [place]ARRS:25  Periodic Controlled Substance Monitoring: Possible medication side effects, risk of tolerance/dependence & alternative treatments discussed. ,No signs of potential drug abuse or diversion identified. ,Obtaining appropriate analgesic effect of treatment. ,Assessed functional status. Cathy Gee, APRN - CNP)  Review of[place]ARRS does not show any aberrant prescription behavior. Medication is helping the patient stay active. Level Comments :   pt sat edge of bed approximately 8 minutes dependent x 1   CVA Patient :   Yes   Launch Barthel Index :   Yes   Hellen Lange - 1/8/2019 11:15    Balance   KU Balance Scale Performed - Sitting :   Yes   [facility] Sitting Balance Scale :   0 = Patient performs 25% or less of sitting activity (maximum assist)   Hellen Lange - 1/8/2019 11:15    Treatments and Interventions   Treatments and Interventions Checklist :   Initial evaluation, Bed mobility, Static sitting balance activities, Functional activities   Patient/Family Education :   Yes   Hellen Lange - 1/8/2019 11:15    Education   General Patient Education Powergrid   Topics :    Plan of care              Topic details :    role of therapy              Length of Time Educating Patient :    5 mins              Individuals Taught :    Patient, Spouse, Family member, Friend, Son  (Comment: pt's , 2 sons, 1 daughter-in-law, and friend present [Hellen Lange - 1/8/2019 12:12 ] )             Patient Family Preference :    Explanation              Teaching Evaluation :    Verbalizes understanding  (Comment: family verbalizes understanding [Hellen Lange - 1/8/2019 12:12 ] )               Hellen Lange - 1/8/2019 11:15          Learning Style Preference Adult Grid   Patient :   Verbal explanation   Family :   Verbal explanation   Hellen Lange - 1/8/2019 11:15    Barriers to Learning :   Acuity of Illness, Cognitive deficit, Communication deficit   Language for Written Material :   English   Hellen Lange - 1/8/2019 11:15    Barthel Index   Feeding Barthel Index :   Cannot meet criteria   Wheelchair Transfers Barthel Index :   Cannot meet criteria   Toileting Bathel Index :   Cannot meet criteria   Personal Hygiene Barthel Index :   With help   Able to Walk Barthel Index :   No   Bathing Barthel Index :   Cannot meet criteria   Able to Propel Wheelchair Barthel Index :   Cannot meet criteria   Dressing Barthel Index :   Cannot  CHOLECYSTECTOMY  2007    COLONOSCOPY      COLONOSCOPY  04/10/2017    tubular adenomo polyp , mod. sigmoid diverticulosis, min. int. hemorrhoids    COLONOSCOPY N/A 3/2/2021    COLONOSCOPY WITH BIOPSY performed by Jonny Burns MD at [place] [address]  9/25/2013    DILATION AND CURETTAGE  1979    EYE SURGERY      laser    INCONTINENCE SURGERY      Percutaneous nerve stimulation Dr Fozia Neville Nov 2013     INSERTABLE CARDIAC MONITOR  12/04/2015    [employer] REVEAL LINQ MODEL #[id number] MRI CONDTIONAL OK 3T. IMMEDIATELY POST IMPLANT    OTHER SURGICAL HISTORY  09/10/15    removal of interstim    MS COLSC FLX W/REMOVAL LESION BY HOT BX FORCEPS N/A 4/10/2017    COLONOSCOPY POLYPECTOMY HOT BIOPSY performed by Sonny Bowles MD at [address] [place] [address]      TYMPANOPLASTY      TYMPANOPLASTY      TYMPANOSTOMY TUBE PLACEMENT  08/21/2017    UPPER GASTROINTESTINAL ENDOSCOPY  03/2016    Dr Gracy Tenorio N/A 3/2/2021    EGD BIOPSY performed by Jonny Burns MD at [facility]       Allergies   Allergen Reactions    Robitussin [Guaifenesin] Anaphylaxis    Codeine Swelling    Compazine [Prochlorperazine Maleate] Hives and Swelling    Iodides Itching    Morphine Other (See Comments)     hallucinations    Moxifloxacin      Other reaction(s): Intolerance-unknown  Other reaction(s):  Intolerance-unknown    Reglan [Metoclopramide] Nausea Only    Avelox [Moxifloxacin Hcl In Nacl] Rash     Dermatitis      Cipro Xr Rash     dermatitis    Sulfa Antibiotics Rash         Current Outpatient Medications:     magnesium oxide (MAG-OX) 400 MG tablet, , Disp: , Rfl:     insulin glargine (BASAGLAR KWIKPEN) 100 UNIT/ML injection pen, Inject 55 Units into the skin 2 times daily, Disp: 10 mL, Rfl: 2    Dulaglutide (TRULICITY) 6.87 RW/9.4UL SOPN, Inject 0.75 mg into the skin every 7 days If pen needle is needed, please request, Disp: 4 pen, Rfl: 5    ferrous sulfate (IRON 325) 325 meet criteria   Ascend/Descend Stairs Barthel Index :   Cannot meet criteria   Controlling Bladder Barthel Index :   Cannot meet criteria   Controlling Bowels Barthel Index :   Cannot meet criteria   Barthel Index Score :   5    Hellen Lange - 1/8/2019 11:15    Assessment and Goals   PT Plan/Recommendations :   Therapeutic exercise, Bed mobility, Transfer training, Gait training, Endurance training, Education of patient/family   Frequency :   Daily x 7 days per week   Location of PT Intervention :   Bedside   Home Exercise Program :   To be determined   PT Care Plan Discussed With :   Patient, Family   Equipment Needed? :   To be determined   Equipment Comments :   pt. left supine in bed with family present and RN aware   Assessment :   Pt. is 65 year old female admitted with R MCA stroke.  Pt. with recent diagnosis of lung cancer with metastasis to brain and bone.  Pt. also with recent CVA, L LE DVT, PE with IVC filter placement (5-6 weeks ago).  Pt. lives with  in 2 story home with 2 step entry and 16 stairs with 1 rail to reach second floor bedroom and full bathroom.  Prior to last 6 weeks, pt. independent with all ADLs, IADLs, transfers and mobility; recently, pt. has required increased assistance with mobility and family performing IADLs.  Pt. participated in high complexity evaluation.  Pt. demonstrates deficits in bed mobility, sitting balance, L UE/LE strength, activity tolerance/endurance, and decreased cognition.  Recommend skilled PT services to address below stated goals.  Recommend daily skilled PT at discharge.    Progressive mobility recommendation: up to chair via mechanical lift    Pt. seen from 7021-1260 for a total of 37 minutes  Evaluation: 22 minutes  Therapeutic activities: 15 minutes     Hellen Lange - 1/8/2019 12:12    Rehab Potential :   Fair   Follow-Up PT Recommendations :   To be determined   Additional PT Recommendations :   daily skilled PT   Hellen Lange - 1/8/2019 11:15     Hellen Lange - 1/8/2019 11:15    PT Goals Grid   Problem :    bed mobility   transfers   gait   rolling     Patient will... :    pt. will transfer supine to/from sitting with max assist x 1 in preparation for OOB activity   to be assessed as appropriate   to be assessed as appropriate   pt. will roll right/left with max assist x 1 for repositioning/pressure relief     Timeframe :    5-7 days         5-7 days       Hellen Lange - 1/8/2019 12:12   Hellen Lange 1/8/2019 12:12   Hellen Lange - 1/8/2019 12:12   Hellen Lange 1/8/2019 12:12               (65 Fe) MG tablet, TAKE 1 TAB BY MOUTH IN THE MORNING, Disp: 30 tablet, Rfl: 0    losartan (COZAAR) 25 MG tablet, TAKE 1 TABLET BY MOUTH ONCE DAILY, Disp: 30 tablet, Rfl: 9    oxyCODONE-acetaminophen (PERCOCET) 5-325 MG per tablet, Take 1 tablet by mouth See Admin Instructions for 30 days. Intended supply: 30 days.  1 tab 3-4 times a day prn, Disp: 100 tablet, Rfl: 0    carvedilol (COREG) 3.125 MG tablet, TAKE 1 TAB BY MOUTH TWICE A DAY ( IN THE MORNING AND BEDTIME ), Disp: 180 tablet, Rfl: 3    ketoconazole (NIZORAL) 2 % cream, Apply topically  2 times a day., Disp: 1 each, Rfl: 0    oxybutynin (DITROPAN XL) 5 MG extended release tablet, Take 1 tablet by mouth daily, Disp: 30 tablet, Rfl: 3    citalopram (CELEXA) 20 MG tablet, TAKE 1 TABLET BY MOUTH DAILY NEEDS TO DECREASE THE CELEXA TO 20 MG DUE TO SIDE EFFECTS ON THE HEART, Disp: 30 tablet, Rfl: 5    aspirin 81 MG EC tablet, TAKE 1 TABLET BY MOUTH ONCE DAILY, Disp: 90 tablet, Rfl: 1    pantoprazole (PROTONIX) 40 MG tablet, TAKE 1 TABLET BY MOUTH TWICE A DAY, Disp: 60 tablet, Rfl: 5    docusate sodium (COLACE) 100 MG capsule, TAKE 1 CAPSULE BY MOUTH TWICE A DAY, Disp: 60 capsule, Rfl: 5    Multiple Vitamins-Minerals (CERTAVITE/ANTIOXIDANTS) TABS, TAKE 1 TABLET BY MOUTH ONCE DAILY, Disp: 30 tablet, Rfl: 5    isosorbide dinitrate (ISORDIL) 30 MG tablet, TAKE 1 TABLET BY MOUTH ONCE DAILY, Disp: 30 tablet, Rfl: 5    vitamin B-12 (CYANOCOBALAMIN) 100 MCG tablet, take half a tablet BY MOUTH ONCE DAILY, Disp: 30 tablet, Rfl: 0    Icosapent Ethyl (VASCEPA) 1 g CAPS capsule, TAKE 1 CAPSULE BY MOUTH TWICE A DAY, Disp: 60 capsule, Rfl: 0    fenofibrate (TRIGLIDE) 160 MG tablet, Take 1 tablet by mouth daily, Disp: 90 tablet, Rfl: 1    topiramate (TOPAMAX) 100 MG tablet, TAKE 1 TABLET BY MOUTH NIGHTLY , Disp: 90 tablet, Rfl: 2    clopidogrel (PLAVIX) 75 MG tablet, TAKE 1 TAB BY MOUTH ONCE A DAY ( IN THE MORNING ) -THIS MEDICINE MAY BE TAKENWITH OR WITHOUT FOOD , Disp: 90 tablet, Rfl: 1    metFORMIN (GLUCOPHAGE) 1000 MG tablet, TAKE 1 TAB BY MOUTH TWICE A DAY ( IN THE MORNING AND BEDTIME ) , Disp: 180 tablet, Rfl: 3    atorvastatin (LIPITOR) 40 MG tablet, Take 1 tablet by mouth daily, Disp: 90 tablet, Rfl: 3    Psyllium (METAMUCIL PO), Take by mouth 3 times daily Takes powder, Disp: , Rfl:     gabapentin (NEURONTIN) 300 MG capsule, Take 1 capsule by mouth 3 times daily for 30 days. (Patient taking differently: Take 300 mg by mouth 2 times daily. ), Disp: 90 capsule, Rfl: 2    nystatin (MYCOSTATIN) 414345 UNIT/GM powder, Apply 3 times daily. , Disp: 3 Bottle, Rfl: 3    zoster recombinant adjuvanted vaccine (SHINGRIX) 50 MCG/0.5ML SUSR injection, 50 MCG IM then repeat 2-6 months., Disp: 0.5 mL, Rfl: 1    Continuous Blood Gluc Sensor (DEXCOM G6 SENSOR) MISC, Use daily for checking blood sugars, Disp: 1 each, Rfl: 2    ibuprofen (ADVIL;MOTRIN) 600 MG tablet, Take 1 tablet by mouth 3 times daily as needed for Pain, Disp: 30 tablet, Rfl: 0    furosemide (LASIX) 20 MG tablet, TAKE 1 TABLET BY MOUTH ONCE DAILY AS NEEDED, Disp: 30 tablet, Rfl: 0    dicyclomine (BENTYL) 10 MG capsule, TAKE 1 CAPSULE BY MOUTH TWICE A DAY AS NEEDED FOR ABDOMINAL SPASMS, Disp: 60 capsule, Rfl: 0    blood glucose test strips (ONETOUCH ULTRA) strip, TEST TWO (2) TO THREE (3) TIMES A DAY & AS NEEDED FOR SYMPTOMS OF IRREGULAR BLOOD GLUCOSE, Disp: 100 strip, Rfl: 0    blood glucose test strips (ONETOUCH ULTRA) strip, TEST TWO (2) TO THREE (3) TIMES A DAY & AS NEEDED FOR SYMPTOMS OF IRREGULAR BLOOD GLUCOSE, Disp: 100 strip, Rfl: 0    Blood Glucose Monitoring Suppl (ONE TOUCH ULTRA 2) w/Device KIT, , Disp: , Rfl:     ONETOUCH ULTRA strip, TEST TWO (2) TO THREE (3) TIMES A DAY& AS NEEDED , Disp: 100 each, Rfl: 0    insulin aspart (NOVOLOG FLEXPEN) 100 UNIT/ML injection pen, IF <139 - NO INSULIN; 140-199-2 UNITS;200-249-4 UNITS;250-299-6 UNITS;300-349-8 UNITS;350-400=10 UNITS;ABOVE 400-12 UNITS, Disp: 15 mL, Rfl: 3    BiPAP Machine MISC, repair/replace Bipap maintain 18/9CMH2O, Cflex=3,mask,tubing,filters,headgear,heated humidifier,all supplies PRN, Disp: , Rfl:     blood glucose test strips (TRUE METRIX BLOOD GLUCOSE TEST) strip, THREE TIMES A DAY, Disp: 100 each, Rfl: 3    albuterol (PROVENTIL) (2.5 MG/3ML) 0.083% nebulizer solution, Take 3 mLs by nebulization every 6 hours as needed for Wheezing, Disp: 120 each, Rfl: 3    ULTICARE MINI PEN NEEDLES 31G X 6 MM MISC, USE AS DIRECTED , Disp: 100 each, Rfl: 5    lidocaine (LMX) 4 % cream, Apply topically every 8 hrs as needed for pain, Disp: 45 g, Rfl: 3    Lift Chair MISC, by Does not apply route, Disp: 1 each, Rfl: 0    Misc.  Devices (ADJUST BATH/SHOWER SEAT/BACK) MISC, Use daily for shower, Disp: 1 each, Rfl: 0    Alcohol Swabs (B-D SINGLE USE SWABS REGULAR) PADS, THREE TIMES A DAY, Disp: 100 each, Rfl: 0    nitroGLYCERIN (NITROSTAT) 0.4 MG SL tablet, 1 under the tongue as needed for angina, may repeat q5mins for up three doses, Disp: , Rfl:     Blood Glucose Monitoring Suppl [facility], Use daily to check BS, Disp: 1 Device, Rfl: 0    ipratropium-albuterol (DUONEB) 0.5-2.5 (3) MG/3ML SOLN nebulizer solution, Inhale 3 mLs into the lungs every 4 hours, Disp: 360 mL, Rfl: 2    Melatonin 10 MG TABS, Take 10 mg by mouth nightly, Disp: 90 tablet, Rfl: 3    Compression Stockings MISC, by Does not apply route Pressure between 20 - 25 ., Disp: 1 each, Rfl: 0    SYMBICORT 160-4.5 MCG/ACT AERO,  2 puffs As needed for sob, Disp: , Rfl:     OXYGEN, Inhale 3 L into the lungs , Disp: , Rfl:     Family History   Problem Relation Age of Onset    Diabetes Mother     Heart Disease Mother     Stomach Cancer Father     Diabetes Maternal Grandmother         also aunts and uncles (maternal)    Emphysema Sister        Social History     Socioeconomic History    Marital status: Legally      Spouse name: Not on file    Number of children: Not on file    Years of education: Not on file    Highest education level: Not on file   Occupational History    Occupation: disabled     Employer: N/A   Tobacco Use    Smoking status: Former Smoker     Packs/day: 3.00     Years: 41.00     Pack years: 123.00     Types: Cigarettes     Quit date: 2000     Years since quittin.2    Smokeless tobacco: Never Used    Tobacco comment: quit in    Vaping Use    Vaping Use: Never used   Substance and Sexual Activity    Alcohol use: No     Alcohol/week: 0.0 standard drinks    Drug use: No    Sexual activity: Not Currently   Other Topics Concern    Not on file   Social History Narrative    Not on file     Social Determinants of Health     Financial Resource Strain: Low Risk     Difficulty of Paying Living Expenses: Not hard at all   Food Insecurity: No Food Insecurity    Worried About [address] [employer] in the Last Year: Never true    [facility] in the Last Year: Never true   Transportation Needs:     Lack of Transportation (Medical): Not on file    Lack of Transportation (Non-Medical):  Not on file   Physical Activity:     Days of Exercise per Week: Not on file    Minutes of Exercise per Session: Not on file   Stress:     Feeling of Stress : Not on file   Social Connections:     Frequency of Communication with Friends and Family: Not on file    Frequency of Social Gatherings with Friends and Family: Not on file    Attends Confucianism Services: Not on file    Active Member of [address] [place] [address] or Organizations: Not on file    Attends Club or Organization Meetings: Not on file    Marital Status: Not on file   Intimate Partner Violence:     Fear of Current or Ex-Partner: Not on file    Emotionally Abused: Not on file    Physically Abused: Not on file    Sexually Abused: Not on file   Housing Stability:     Unable to Pay for Housing in the Last Year: Not on file    Number of [place] in the Last Year: Not on file    Unstable Housing in the Last Year: Not on file         Review of Systems:  Review of Systems   Constitutional: Negative. HENT: Positive for ear discharge. Eyes: Positive for visual disturbance. Glasses   Cardiovascular:        Occasional chest pain   Respiratory:        Oxygen prn   Endocrine:        Blood sugars elevated   Hematologic/Lymphatic: Bruises/bleeds easily. Skin: Negative. Musculoskeletal: Positive for back pain and joint pain. Gastrointestinal: Negative. Genitourinary: Negative. Neurological: Positive for numbness. Walker   Psychiatric/Behavioral: Positive for depression. Managing         Physical Exam:  /60   Pulse 70   Temp 96.6 °F (35.9 °C) (Temporal)   Resp 20   LMP  (LMP Unknown)   SpO2 94%     Physical Exam  Constitutional:       Appearance: She is obese. HENT:      Head: Normocephalic. Pulmonary:      Effort: Pulmonary effort is normal.   Skin:            Comments: Tender lumbar paraspinal muscles   Neurological:      Mental Status: She is alert and oriented to person, place, and time. Psychiatric:         Mood and Affect: Mood normal.         Thought Content: Thought content normal.           Assessment:    Problem List Items Addressed This Visit     Sacroiliitis, not elsewhere classified ([address] - Primary (Chronic)    Osteoarthritis of cervical spine (Chronic)    Neuropathy    Morbid obesity with BMI of 40.0-44.9, adult (HCC)    Medication monitoring encounter (Chronic)    Lumbosacral spondylosis without myelopathy (Chronic)    Lumbar radiculopathy, chronic    DDD (degenerative disc disease), lumbar    DDD (degenerative disc disease), cervical (Chronic)    Bilateral low back pain with sciatica              Treatment Plan:  DISCUSSION: Treatment options discussed withpatient and all questions answered to patient's satisfaction.      Possible side effects, risk of tolerance and or dependence and alternative treatments discussed    Obtaining appropriate analgesic effect of treatment No signs of potential drug abuse or diversion identified    [x] Ill effects of being on chronic pain medications such as sleep disturbances, respiratory depression, hormonal changes, withdrawal symptoms, chronic opioid dependence and tolerance as well as risk of taking opioids with Benzodiazepines and taking opioids along with alcohol,  werediscussed with patient. I had asked the patient to minimize medication use and utilize pain medications only for uncontrolled rest pain or pain with exertional activities. I advised patient not to self-escalate painmedications without consulting with us. At each of patient's future visits we will try to taper pain medications, while adjusting the adjunct medications, and re-evaluating for Physical Therapy to improve spinal andjoint strength. We will continue to have discussions to decrease pain medications as tolerated. Counseled patient on effects their pain medication and /or their medical condition mayhave on their  ability to drive or operate machinery. Instructed not to drive or operate machinery if drowsy     I also discussed with the patient regarding the dangers of combining narcotic pain medication with tranquilizers, alcohol or illegal drugs or taking the medication any way other than prescribed. The dangers were discussed  including respiratory depression and death. Patient was told to tell  all  physicians regarding the medications he is getting from pain clinic. Patient is warned not to take any unprescribed medications over-the-countermedications that can depress breathing . Patient is advised to talk to the pharmacist or physicians if planning to take any over-the-counter medications before  takeing them. Patient is strongly advised to avoid tranquilizers or  relaxants, illegal drugs  or any medications that can depress breathing  Patient is also advised to tell us if there is any changes in their medications from other physicians.         TREATMENT OPTIONS: Medication Agreement Requirements Met  Continue Opioid therapy  Script written for  Percocet  Follow up appointment made

## 2022-03-09 ENCOUNTER — HOSPITAL ENCOUNTER (OUTPATIENT)
Dept: PAIN MANAGEMENT | Age: 72
Discharge: HOME | End: 2022-03-09
Payer: COMMERCIAL

## 2022-03-09 ENCOUNTER — HOSPITAL ENCOUNTER (OUTPATIENT)
Age: 72
Discharge: HOME | End: 2022-03-09
Payer: COMMERCIAL

## 2022-03-09 VITALS
SYSTOLIC BLOOD PRESSURE: 111 MMHG | HEART RATE: 70 BPM | RESPIRATION RATE: 20 BRPM | TEMPERATURE: 96.6 F | DIASTOLIC BLOOD PRESSURE: 60 MMHG | OXYGEN SATURATION: 94 %

## 2022-03-09 DIAGNOSIS — E66.01 MORBID OBESITY WITH BMI OF 40.0-44.9, ADULT (HCC): ICD-10-CM

## 2022-03-09 DIAGNOSIS — M54.16 LUMBAR RADICULOPATHY, CHRONIC: ICD-10-CM

## 2022-03-09 DIAGNOSIS — M50.30 DDD (DEGENERATIVE DISC DISEASE), CERVICAL: Chronic | ICD-10-CM

## 2022-03-09 DIAGNOSIS — M51.36 DDD (DEGENERATIVE DISC DISEASE), LUMBAR: ICD-10-CM

## 2022-03-09 DIAGNOSIS — M54.40 CHRONIC BILATERAL LOW BACK PAIN WITH SCIATICA, SCIATICA LATERALITY UNSPECIFIED: ICD-10-CM

## 2022-03-09 DIAGNOSIS — G89.29 CHRONIC BILATERAL LOW BACK PAIN WITH SCIATICA, SCIATICA LATERALITY UNSPECIFIED: ICD-10-CM

## 2022-03-09 DIAGNOSIS — E11.42 TYPE 2 DIABETES MELLITUS WITH DIABETIC POLYNEUROPATHY, WITH LONG-TERM CURRENT USE OF INSULIN (HCC): ICD-10-CM

## 2022-03-09 DIAGNOSIS — M47.817 LUMBOSACRAL SPONDYLOSIS WITHOUT MYELOPATHY: Chronic | ICD-10-CM

## 2022-03-09 DIAGNOSIS — Z79.4 TYPE 2 DIABETES MELLITUS WITH DIABETIC POLYNEUROPATHY, WITH LONG-TERM CURRENT USE OF INSULIN (HCC): ICD-10-CM

## 2022-03-09 DIAGNOSIS — E78.2 MIXED HYPERLIPIDEMIA: ICD-10-CM

## 2022-03-09 DIAGNOSIS — M47.892 OTHER OSTEOARTHRITIS OF SPINE, CERVICAL REGION: Chronic | ICD-10-CM

## 2022-03-09 DIAGNOSIS — G62.9 NEUROPATHY: ICD-10-CM

## 2022-03-09 DIAGNOSIS — Z51.81 MEDICATION MONITORING ENCOUNTER: Chronic | ICD-10-CM

## 2022-03-09 DIAGNOSIS — M46.1 SACROILIITIS, NOT ELSEWHERE CLASSIFIED (HCC): Primary | Chronic | ICD-10-CM

## 2022-03-09 LAB
ABSOLUTE EOS #: 0.2 K/UL (ref 0–0.4)
ABSOLUTE LYMPH #: 1.7 K/UL (ref 1–4.8)
ABSOLUTE MONO #: 0.5 K/UL (ref 0.1–1.3)
ALBUMIN SERPL-MCNC: 4.2 G/DL (ref 3.5–5.2)
ALP BLD-CCNC: 53 U/L (ref 35–104)
ALT SERPL-CCNC: 19 U/L (ref 5–33)
ANION GAP SERPL CALCULATED.3IONS-SCNC: 8 MMOL/L (ref 9–17)
AST SERPL-CCNC: 19 U/L
BASOPHILS # BLD: 1 % (ref 0–2)
BASOPHILS ABSOLUTE: 0.1 K/UL (ref 0–0.2)
BILIRUB SERPL-MCNC: 0.3 MG/DL (ref 0.3–1.2)
BUN BLDV-MCNC: 15 MG/DL (ref 8–23)
CALCIUM SERPL-MCNC: 9.2 MG/DL (ref 8.6–10.4)
CHLORIDE BLD-SCNC: 99 MMOL/L (ref 98–107)
CHOLESTEROL/HDL RATIO: 4.9
CHOLESTEROL: 211 MG/DL
CO2: 29 MMOL/L (ref 20–31)
CREAT SERPL-MCNC: 1.13 MG/DL (ref 0.5–0.9)
CREATININE URINE: 228.9 MG/DL (ref 28–217)
EOSINOPHILS RELATIVE PERCENT: 3 % (ref 0–4)
GFR AFRICAN AMERICAN: 57 ML/MIN
GFR NON-AFRICAN AMERICAN: 47 ML/MIN
GFR SERPL CREATININE-BSD FRML MDRD: ABNORMAL ML/MIN/{1.73_M2}
GLUCOSE BLD-MCNC: 222 MG/DL (ref 70–99)
HCT VFR BLD CALC: 39.5 % (ref 36–46)
HDLC SERPL-MCNC: 43 MG/DL
HEMOGLOBIN: 13.1 G/DL (ref 12–16)
LDL CHOLESTEROL: 128 MG/DL (ref 0–130)
LYMPHOCYTES # BLD: 26 % (ref 24–44)
MCH RBC QN AUTO: 31.2 PG (ref 26–34)
MCHC RBC AUTO-ENTMCNC: 33.3 G/DL (ref 31–37)
MCV RBC AUTO: 93.7 FL (ref 80–100)
MICROALBUMIN/CREAT 24H UR: 20 MG/L
MICROALBUMIN/CREAT UR-RTO: 9 MCG/MG CREAT
MONOCYTES # BLD: 8 % (ref 1–7)
PDW BLD-RTO: 13.7 % (ref 11.5–14.9)
PLATELET # BLD: 185 K/UL (ref 150–450)
PMV BLD AUTO: 9.6 FL (ref 6–12)
POTASSIUM SERPL-SCNC: 5 MMOL/L (ref 3.7–5.3)
RBC # BLD: 4.21 M/UL (ref 4–5.2)
SEG NEUTROPHILS: 62 % (ref 36–66)
SEGMENTED NEUTROPHILS ABSOLUTE COUNT: 4 K/UL (ref 1.3–9.1)
SODIUM BLD-SCNC: 136 MMOL/L (ref 135–144)
TOTAL PROTEIN: 6.8 G/DL (ref 6.4–8.3)
TRIGL SERPL-MCNC: 202 MG/DL
TSH SERPL DL<=0.05 MIU/L-ACNC: 4.04 MIU/L (ref 0.3–5)
WBC # BLD: 6.5 K/UL (ref 3.5–11)

## 2022-03-09 PROCEDURE — 85025 COMPLETE CBC W/AUTO DIFF WBC: CPT

## 2022-03-09 PROCEDURE — 82570 ASSAY OF URINE CREATININE: CPT

## 2022-03-09 PROCEDURE — 80061 LIPID PANEL: CPT

## 2022-03-09 PROCEDURE — 99213 OFFICE O/P EST LOW 20 MIN: CPT | Performed by: NURSE PRACTITIONER

## 2022-03-09 PROCEDURE — 80053 COMPREHEN METABOLIC PANEL: CPT

## 2022-03-09 PROCEDURE — 36415 COLL VENOUS BLD VENIPUNCTURE: CPT

## 2022-03-09 PROCEDURE — 84443 ASSAY THYROID STIM HORMONE: CPT

## 2022-03-09 PROCEDURE — 82043 UR ALBUMIN QUANTITATIVE: CPT

## 2022-03-09 PROCEDURE — 99213 OFFICE O/P EST LOW 20 MIN: CPT

## 2022-03-09 RX ORDER — OXYCODONE HYDROCHLORIDE AND ACETAMINOPHEN 5; 325 MG/1; MG/1
1 TABLET ORAL SEE ADMIN INSTRUCTIONS
Qty: 100 TABLET | Refills: 0 | Status: SHIPPED | OUTPATIENT
Start: 2022-03-11 | End: 2022-04-07 | Stop reason: SDUPTHER

## 2022-03-09 ASSESSMENT — ENCOUNTER SYMPTOMS
BACK PAIN: 1
GASTROINTESTINAL NEGATIVE: 1

## 2022-03-14 ENCOUNTER — HOSPITAL ENCOUNTER (OUTPATIENT)
Dept: NUCLEAR MEDICINE | Age: 72
Discharge: HOME | End: 2022-03-16
Payer: COMMERCIAL

## 2022-03-14 ENCOUNTER — HOSPITAL ENCOUNTER (OUTPATIENT)
Dept: NON INVASIVE DIAGNOSTICS | Age: 72
Discharge: HOME | End: 2022-03-14
Payer: COMMERCIAL

## 2022-03-14 DIAGNOSIS — R07.9 CHEST PAIN, UNSPECIFIED TYPE: ICD-10-CM

## 2022-03-14 LAB
LV EF: 86 %
LVEF MODALITY: NORMAL

## 2022-03-14 PROCEDURE — 93017 CV STRESS TEST TRACING ONLY: CPT

## 2022-03-14 PROCEDURE — A9500 TC99M SESTAMIBI: HCPCS | Performed by: INTERNAL MEDICINE

## 2022-03-14 PROCEDURE — 2580000003 HC RX 258: Performed by: INTERNAL MEDICINE

## 2022-03-14 PROCEDURE — 6360000002 HC RX W HCPCS: Performed by: INTERNAL MEDICINE

## 2022-03-14 PROCEDURE — 6370000000 HC RX 637 (ALT 250 FOR IP): Performed by: INTERNAL MEDICINE

## 2022-03-14 PROCEDURE — 3430000000 HC RX DIAGNOSTIC RADIOPHARMACEUTICAL: Performed by: INTERNAL MEDICINE

## 2022-03-14 PROCEDURE — 78452 HT MUSCLE IMAGE SPECT MULT: CPT

## 2022-03-14 RX ORDER — METOPROLOL TARTRATE 5 MG/5ML
5 INJECTION INTRAVENOUS EVERY 5 MIN PRN
Status: DISCONTINUED | OUTPATIENT
Start: 2022-03-14 | End: 2022-03-14 | Stop reason: ALTCHOICE

## 2022-03-14 RX ORDER — ATROPINE SULFATE 0.1 MG/ML
0.5 INJECTION INTRAVENOUS EVERY 5 MIN PRN
Status: DISCONTINUED | OUTPATIENT
Start: 2022-03-14 | End: 2022-03-14 | Stop reason: ALTCHOICE

## 2022-03-14 RX ORDER — SODIUM CHLORIDE 0.9 % (FLUSH) 0.9 %
10 SYRINGE (ML) INJECTION PRN
Status: DISCONTINUED | OUTPATIENT
Start: 2022-03-14 | End: 2022-03-17 | Stop reason: HOSPADM

## 2022-03-14 RX ORDER — SODIUM CHLORIDE 9 MG/ML
500 INJECTION, SOLUTION INTRAVENOUS CONTINUOUS PRN
Status: DISCONTINUED | OUTPATIENT
Start: 2022-03-14 | End: 2022-03-14 | Stop reason: ALTCHOICE

## 2022-03-14 RX ORDER — SODIUM CHLORIDE 0.9 % (FLUSH) 0.9 %
5-40 SYRINGE (ML) INJECTION PRN
Status: DISCONTINUED | OUTPATIENT
Start: 2022-03-14 | End: 2022-03-14 | Stop reason: ALTCHOICE

## 2022-03-14 RX ORDER — ALBUTEROL SULFATE 90 UG/1
2 AEROSOL, METERED RESPIRATORY (INHALATION) PRN
Status: DISCONTINUED | OUTPATIENT
Start: 2022-03-14 | End: 2022-03-14 | Stop reason: ALTCHOICE

## 2022-03-14 RX ORDER — AMINOPHYLLINE DIHYDRATE 25 MG/ML
50 INJECTION, SOLUTION INTRAVENOUS PRN
Status: DISCONTINUED | OUTPATIENT
Start: 2022-03-14 | End: 2022-03-14 | Stop reason: ALTCHOICE

## 2022-03-14 RX ORDER — NITROGLYCERIN 0.4 MG/1
0.4 TABLET SUBLINGUAL EVERY 5 MIN PRN
Status: DISCONTINUED | OUTPATIENT
Start: 2022-03-14 | End: 2022-03-14 | Stop reason: ALTCHOICE

## 2022-03-14 RX ADMIN — SODIUM CHLORIDE, PRESERVATIVE FREE 10 ML: 5 INJECTION INTRAVENOUS at 10:28

## 2022-03-14 RX ADMIN — SODIUM CHLORIDE, PRESERVATIVE FREE 10 ML: 5 INJECTION INTRAVENOUS at 11:00

## 2022-03-14 RX ADMIN — REGADENOSON 0.4 MG: 0.08 INJECTION, SOLUTION INTRAVENOUS at 10:55

## 2022-03-14 RX ADMIN — ALBUTEROL SULFATE 2 PUFF: 90 AEROSOL, METERED RESPIRATORY (INHALATION) at 10:43

## 2022-03-14 RX ADMIN — TETRAKIS(2-METHOXYISOBUTYLISOCYANIDE)COPPER(I) TETRAFLUOROBORATE 41 MILLICURIE: 1 INJECTION, POWDER, LYOPHILIZED, FOR SOLUTION INTRAVENOUS at 11:00

## 2022-03-14 RX ADMIN — TETRAKIS(2-METHOXYISOBUTYLISOCYANIDE)COPPER(I) TETRAFLUOROBORATE 16.6 MILLICURIE: 1 INJECTION, POWDER, LYOPHILIZED, FOR SOLUTION INTRAVENOUS at 09:15

## 2022-03-14 RX ADMIN — SODIUM CHLORIDE, PRESERVATIVE FREE 10 ML: 5 INJECTION INTRAVENOUS at 10:55

## 2022-03-14 RX ADMIN — SODIUM CHLORIDE, PRESERVATIVE FREE 10 ML: 5 INJECTION INTRAVENOUS at 09:15

## 2022-03-14 NOTE — PROCEDURES
[address], [place] [address]                              CARDIAC STRESS TEST    PATIENT NAME: Anirudh Shahid                :        1950  MED REC NO:   [id number]                             ROOM:  ACCOUNT NO:   [de-identified]                           ADMIT DATE: 2022  PROVIDER:     Rachel Abrams MD    DATE OF STUDY:  2022    ORDERING PROVIDER: Dr. Kathrin Voss: Dr. Logan Gaxiola:    Indication: Chest pain    Procedure explained and consent signed. Medications: Lexiscan, 0.4 mg  Resting Heart rate: 81 bpm  Resting blood pressure:  132/98/ mm/Hg  Infusion heart rate:  100 bpm  Infusion blood pressure:  132/98 mm/Hg  Resting EKG: Normal  Stress heart response: Normal Response  Stress BP response: Appropriate  Chest discomfort: 6/10 chest pressure during stress and resolved in  recovery  Ischemic EKG changes: None    IMPRESSION:  Electrocardiographically Negative Lexiscan stress study. Radio-isotope  results to follow from the department of Nuclear Medicine.             Patricia Abdalla MD    D: 2022 15:33:33       T: 2022 15:35:21     /[id number]  Job#: [id number]     Doc#: Unknown    CC:

## 2022-03-18 DIAGNOSIS — D64.9 ANEMIA, UNSPECIFIED TYPE: ICD-10-CM

## 2022-03-18 RX ORDER — FERROUS SULFATE 325(65) MG
TABLET ORAL
Qty: 30 TABLET | Refills: 0 | Status: SHIPPED | OUTPATIENT
Start: 2022-03-18 | End: 2022-04-20

## 2022-03-18 NOTE — TELEPHONE ENCOUNTER
.  Please Approve or Refuse.   Send to Pharmacy per Pt's Request:      Next Visit Date:  5/3/2022   Last Visit Date: 3/1/2022    Hemoglobin A1C (%)   Date Value   03/01/2022 12.5   02/25/2021 7.8   09/14/2020 10.6             ( goal A1C is < 7)   BP Readings from Last 3 Encounters:   03/09/22 111/60   03/01/22 134/70   02/07/22 133/77          (goal 120/80)  BUN   Date Value Ref Range Status   03/09/2022 15 8 - 23 mg/dL Final     CREATININE   Date Value Ref Range Status   03/09/2022 1.13 (H) 0.50 - 0.90 mg/dL Final     Potassium   Date Value Ref Range Status   03/09/2022 5.0 3.7 - 5.3 mmol/L Final

## 2022-03-30 DIAGNOSIS — E11.42 TYPE 2 DIABETES MELLITUS WITH DIABETIC POLYNEUROPATHY, WITH LONG-TERM CURRENT USE OF INSULIN (HCC): Chronic | ICD-10-CM

## 2022-03-30 DIAGNOSIS — E78.2 MIXED HYPERLIPIDEMIA: ICD-10-CM

## 2022-03-30 DIAGNOSIS — N39.3 STRESS INCONTINENCE: ICD-10-CM

## 2022-03-30 DIAGNOSIS — Z79.4 TYPE 2 DIABETES MELLITUS WITH DIABETIC POLYNEUROPATHY, WITH LONG-TERM CURRENT USE OF INSULIN (HCC): Chronic | ICD-10-CM

## 2022-03-30 RX ORDER — FENOFIBRATE 160 MG/1
160 TABLET ORAL DAILY
Qty: 90 TABLET | Refills: 1 | Status: SHIPPED | OUTPATIENT
Start: 2022-03-30

## 2022-03-30 RX ORDER — OXYBUTYNIN CHLORIDE 5 MG/1
5 TABLET, EXTENDED RELEASE ORAL DAILY
Qty: 30 TABLET | Refills: 3 | Status: SHIPPED | OUTPATIENT
Start: 2022-03-30 | End: 2022-09-26

## 2022-03-30 RX ORDER — INSULIN ASPART 100 [IU]/ML
INJECTION, SOLUTION INTRAVENOUS; SUBCUTANEOUS
Qty: 10 ML | Refills: 3 | Status: SHIPPED | OUTPATIENT
Start: 2022-03-30

## 2022-03-31 ENCOUNTER — TELEPHONE (OUTPATIENT)
Dept: FAMILY MEDICINE CLINIC | Age: 72
End: 2022-03-31

## 2022-03-31 DIAGNOSIS — K21.00 GASTROESOPHAGEAL REFLUX DISEASE WITH ESOPHAGITIS: ICD-10-CM

## 2022-03-31 RX ORDER — PANTOPRAZOLE SODIUM 40 MG/1
TABLET, DELAYED RELEASE ORAL
Qty: 60 TABLET | Refills: 5 | Status: SHIPPED | OUTPATIENT
Start: 2022-03-31 | End: 2022-05-10

## 2022-03-31 NOTE — TELEPHONE ENCOUNTER
Let patient know that an acid reflux medication was sent to the pharmacy for her I also recommended that she sleep with her head elevated instead of flat until it is under control.

## 2022-03-31 NOTE — TELEPHONE ENCOUNTER
Patient called requesting if she can get a script called in for acid reflux, stated that it is very severe and the other night she nearly chocked to death due to her acid reflux. Please advise.

## 2022-04-05 ENCOUNTER — TELEPHONE (OUTPATIENT)
Dept: FAMILY MEDICINE CLINIC | Age: 72
End: 2022-04-05

## 2022-04-05 NOTE — TELEPHONE ENCOUNTER
----- Message from [address] sent at 4/4/2022 11:19 AM EDT -----  Subject: Message to Provider    QUESTIONS  Information for Provider? She would like to also change provider within   the practice  ---------------------------------------------------------------------------  --------------  [address]  What is the best way for the office to contact you? OK to leave message on   voicemail  Preferred Call Back Phone Number? [phone]  ---------------------------------------------------------------------------  --------------  SCRIPT ANSWERS  Relationship to Patient?  Self

## 2022-04-07 ENCOUNTER — HOSPITAL ENCOUNTER (OUTPATIENT)
Dept: PAIN MANAGEMENT | Age: 72
Discharge: HOME | End: 2022-04-07
Payer: COMMERCIAL

## 2022-04-07 VITALS
SYSTOLIC BLOOD PRESSURE: 94 MMHG | HEART RATE: 86 BPM | DIASTOLIC BLOOD PRESSURE: 57 MMHG | TEMPERATURE: 96.6 F | BODY MASS INDEX: 43.98 KG/M2 | RESPIRATION RATE: 16 BRPM | OXYGEN SATURATION: 88 % | WEIGHT: 239 LBS | HEIGHT: 62 IN

## 2022-04-07 DIAGNOSIS — M50.30 DDD (DEGENERATIVE DISC DISEASE), CERVICAL: Chronic | ICD-10-CM

## 2022-04-07 DIAGNOSIS — M47.817 LUMBOSACRAL SPONDYLOSIS WITHOUT MYELOPATHY: Chronic | ICD-10-CM

## 2022-04-07 DIAGNOSIS — M54.16 LUMBAR RADICULOPATHY, CHRONIC: ICD-10-CM

## 2022-04-07 DIAGNOSIS — M46.1 SACROILIITIS, NOT ELSEWHERE CLASSIFIED (HCC): Chronic | ICD-10-CM

## 2022-04-07 PROCEDURE — 99213 OFFICE O/P EST LOW 20 MIN: CPT

## 2022-04-07 PROCEDURE — 99213 OFFICE O/P EST LOW 20 MIN: CPT | Performed by: NURSE PRACTITIONER

## 2022-04-07 RX ORDER — OXYCODONE HYDROCHLORIDE AND ACETAMINOPHEN 5; 325 MG/1; MG/1
1 TABLET ORAL SEE ADMIN INSTRUCTIONS
Qty: 100 TABLET | Refills: 0 | Status: SHIPPED | OUTPATIENT
Start: 2022-04-09 | End: 2022-05-09 | Stop reason: SDUPTHER

## 2022-04-07 ASSESSMENT — ENCOUNTER SYMPTOMS
SHORTNESS OF BREATH: 0
CONSTIPATION: 0
RESPIRATORY NEGATIVE: 1
VOMITING: 1
BACK PAIN: 1
COUGH: 0
NAUSEA: 1

## 2022-04-07 ASSESSMENT — PAIN SCALES - GENERAL: PAINLEVEL_OUTOF10: 7

## 2022-04-07 NOTE — PROGRESS NOTES
Chief Complaint   Patient presents with    Back Pain    Medication Refill         PMH     Pt c/o low back pain that radiates down legs. MRI done in 10- with multilevel degenerative changes and Right paracentral disc extrusion L4-5 narrowing the right lateral recess. She has never had a lumbar surgery and not interested in seeing NS for opinion. She is dependant on her wheelchair to get around - can walk very short distances. Working on weight loss and reports recent 20lb loss over last 2 months. Pt has had injections but refuses to repeat d/t exreme pain during injections. HPI:   Back Pain  This is a chronic problem. The problem occurs constantly. The problem is unchanged. The pain is present in the lumbar spine. The quality of the pain is described as aching, shooting and cramping. The pain radiates to the left foot and right foot. The pain is at a severity of 7/10. The pain is moderate. The symptoms are aggravated by position, standing and twisting (walking). Associated symptoms include leg pain, numbness (occ.) and paresthesias. Pertinent negatives include no chest pain or fever. Risk factors include menopause, obesity, poor posture, sedentary lifestyle and lack of exercise. She has tried analgesics and muscle relaxant for the symptoms. The treatment provided mild relief.      Medication Refill: Oxycodone    Pain score Today:  7  Adverse effects (Constipation / Nausea / Sedation / sexual Dysfunction / others) : none  Mood: good  Sleep pattern and quality: good  Activity level: good    Pill count Today: 13  Last dose taken 04/06/2022 night  OARRS report reviewed today: yes  Morphine equivalent: 25  ER/Hospitalizations/PCP visit related to pain since last visit:no   Any legal problems e.g. DUI etc.:No  Satisfied with current management: Yes    Opioid Contract: 02/07/2022  Last Urine Dug screen dated: 02/07/2022    Lab Results   Component Value Date    LABA1C 12.5 03/01/2022     Lab Results Component Value Date     10/31/2019       Past Medical History, Past Surgical History, Social History, Allergies and Medications reviewed and updated in EPIC as indicated    Family History reviewed and is noncontributory. Controlled Substance Monitoring:    Acute and Chronic Pain Monitoring:   RX Monitoring 4/7/2022   Attestation -   Acute Pain Prescriptions -   Periodic Controlled Substance Monitoring Possible medication side effects, risk of tolerance/dependence & alternative treatments discussed. ;No signs of potential drug abuse or diversion identified. ;Assessed functional status. ;Obtaining appropriate analgesic effect of treatment. Chronic Pain > 50 MEDD -   Chronic Pain > 80 MEDD -         Periodic Controlled Substance Monitoring: Possible medication side effects, risk of tolerance/dependence & alternative treatments discussed. ,No signs of potential drug abuse or diversion identified. ,Assessed functional status. ,Obtaining appropriate analgesic effect of treatment. Sarath Monroy APRN - CNP)      Past Medical History:   Diagnosis Date    Abdominal bloating 1/26/2021    Adenomatous polyp of ascending colon 1/26/2021    Allergic rhinitis     Anxiety 7/17/2013    Arthritis     Asthma     Atrial fibrillation (HCC)     Back pain     NERVE/DR. GRACIA    Benign hypertension with CKD (chronic kidney disease) stage III (HCC)     CAD (coronary artery disease) 3/21/2013    Caffeine use     2 coffee/day    CHF (congestive heart failure) (HCC)     Chronic kidney disease     CKD (chronic kidney disease) stage 3, GFR 30-59 ml/min (HCC)     COPD (chronic obstructive pulmonary disease) (HCC)     emphysema    Degeneration of lumbar or lumbosacral intervertebral disc     Depression     DM (diabetes mellitus) (HCC)     Emphysema of lung (HCC)     Gastritis     GERD (gastroesophageal reflux disease)     Hearing loss     Hematuria     Hiatal hernia     HTN (hypertension), benign 6/28/2014    Hypertriglyceridemia     Incontinence of urine 9/25/2013    Irritable bowel syndrome with both constipation and diarrhea 1/26/2021    Knee arthropathy     Lumbosacral spondylosis without myelopathy 9/29/2014    Migraine headache     DR. GRACIA    Mumps     Neuropathy     Obesity     On home oxygen therapy     2 L PER NC  HS AND PRN    HIGINIO on CPAP     Otitis media 1-26-15    Secondary diabetes mellitus with stage 3 chronic kidney disease (GFR 30-59) ([facility])     Stroke ([address]      mini stroke.  Tinnitus     Type 2 diabetes mellitus without complication (HCC)     Type II or unspecified type diabetes mellitus without mention of complication, not stated as uncontrolled     UTI (lower urinary tract infection)     Varicose vein        Past Surgical History:   Procedure Laterality Date    ABLATION OF DYSRHYTHMIC FOCUS  2000    CARPAL TUNNEL RELEASE Right     CATARACT REMOVAL WITH IMPLANT Bilateral     CHOLECYSTECTOMY  2007    COLONOSCOPY      COLONOSCOPY  04/10/2017    tubular adenomo polyp , mod. sigmoid diverticulosis, min. int. hemorrhoids    COLONOSCOPY N/A 3/2/2021    COLONOSCOPY WITH BIOPSY performed by Ramona Ornelas MD at [place] [address]  9/25/2013    DILATION AND CURETTAGE  1979    EYE SURGERY      laser    INCONTINENCE SURGERY      Percutaneous nerve stimulation Dr Cindy Early Nov 2013     INSERTABLE CARDIAC MONITOR  12/04/2015    Gencia REVEAL LINQ MODEL #[id number] MRI CONDTIONAL OK 3T.  IMMEDIATELY POST IMPLANT    OTHER SURGICAL HISTORY  09/10/15    removal of interstim    RI COLSC FLX W/REMOVAL LESION BY HOT BX FORCEPS N/A 4/10/2017    COLONOSCOPY POLYPECTOMY HOT BIOPSY performed by Lory Burks MD at [address] [place] [address]      TYMPANOSTOMY TUBE PLACEMENT  08/21/2017    UPPER GASTROINTESTINAL ENDOSCOPY  03/2016    Dr Gala Matos N/A 3/2/2021    EGD BIOPSY performed by Ramona Ornelas, [facility] at [facility]       Allergies   Allergen Reactions    Robitussin [Guaifenesin] Anaphylaxis    Codeine Swelling    Compazine [Prochlorperazine Maleate] Hives and Swelling    Iodides Itching    Morphine Other (See Comments)     hallucinations    Moxifloxacin      Other reaction(s): Intolerance-unknown  Other reaction(s): Intolerance-unknown    Reglan [Metoclopramide] Nausea Only    Avelox [Moxifloxacin Hcl In Nacl] Rash     Dermatitis      Cipro Xr Rash     dermatitis    Sulfa Antibiotics Rash         Current Outpatient Medications:     [START ON 4/9/2022] oxyCODONE-acetaminophen (PERCOCET) 5-325 MG per tablet, Take 1 tablet by mouth See Admin Instructions for 30 days. Intended supply: 30 days.  1 tab 3-4 times a day prn, Disp: 100 tablet, Rfl: 0    pantoprazole (PROTONIX) 40 MG tablet, TAKE 1 TABLET BY MOUTH TWICE A DAY, Disp: 60 tablet, Rfl: 5    oxybutynin (DITROPAN-XL) 5 MG extended release tablet, TAKE 1 TABLET BY MOUTH DAILY, Disp: 30 tablet, Rfl: 3    fenofibrate (TRIGLIDE) 160 MG tablet, TAKE 1 TABLET BY MOUTH DAILY, Disp: 90 tablet, Rfl: 1    insulin aspart (NOVOLOG FLEXPEN) 100 UNIT/ML injection pen, IF<139 NO INSULIN; 140-199-2 UN;200-249-4 UN;250-299-6 LK;681-293-7 UN;350-400=10 UN;ABOVE 400-12 UNIT(S), Disp: 10 mL, Rfl: 3    FEROSUL 325 (65 Fe) MG tablet, TAKE 1 TAB BY MOUTH IN THE MORNING, Disp: 30 tablet, Rfl: 0    magnesium oxide (MAG-OX) 400 MG tablet, , Disp: , Rfl:     zoster recombinant adjuvanted vaccine (SHINGRIX) 50 MCG/0.5ML SUSR injection, 50 MCG IM then repeat 2-6 months., Disp: 0.5 mL, Rfl: 1    insulin glargine (BASAGLAR KWIKPEN) 100 UNIT/ML injection pen, Inject 55 Units into the skin 2 times daily, Disp: 10 mL, Rfl: 2    Dulaglutide (TRULICITY) 2.86 MO/3.8CM SOPN, Inject 0.75 mg into the skin every 7 days If pen needle is needed, please request, Disp: 4 pen, Rfl: 5    Continuous Blood Gluc Sensor (DEXCOM G6 SENSOR) MISC, Use daily for checking blood sugars, Disp: 1 each, Rfl: 2    losartan (COZAAR) 25 MG tablet, TAKE 1 TABLET BY MOUTH ONCE DAILY, Disp: 30 tablet, Rfl: 9    ibuprofen (ADVIL;MOTRIN) 600 MG tablet, Take 1 tablet by mouth 3 times daily as needed for Pain, Disp: 30 tablet, Rfl: 0    carvedilol (COREG) 3.125 MG tablet, TAKE 1 TAB BY MOUTH TWICE A DAY ( IN THE MORNING AND BEDTIME ), Disp: 180 tablet, Rfl: 3    ketoconazole (NIZORAL) 2 % cream, Apply topically  2 times a day., Disp: 1 each, Rfl: 0    citalopram (CELEXA) 20 MG tablet, TAKE 1 TABLET BY MOUTH DAILY NEEDS TO DECREASE THE CELEXA TO 20 MG DUE TO SIDE EFFECTS ON THE HEART, Disp: 30 tablet, Rfl: 5    aspirin 81 MG EC tablet, TAKE 1 TABLET BY MOUTH ONCE DAILY, Disp: 90 tablet, Rfl: 1    docusate sodium (COLACE) 100 MG capsule, TAKE 1 CAPSULE BY MOUTH TWICE A DAY, Disp: 60 capsule, Rfl: 5    Multiple Vitamins-Minerals (CERTAVITE/ANTIOXIDANTS) TABS, TAKE 1 TABLET BY MOUTH ONCE DAILY, Disp: 30 tablet, Rfl: 5    isosorbide dinitrate (ISORDIL) 30 MG tablet, TAKE 1 TABLET BY MOUTH ONCE DAILY, Disp: 30 tablet, Rfl: 5    vitamin B-12 (CYANOCOBALAMIN) 100 MCG tablet, take half a tablet BY MOUTH ONCE DAILY, Disp: 30 tablet, Rfl: 0    furosemide (LASIX) 20 MG tablet, TAKE 1 TABLET BY MOUTH ONCE DAILY AS NEEDED, Disp: 30 tablet, Rfl: 0    Icosapent Ethyl (VASCEPA) 1 g CAPS capsule, TAKE 1 CAPSULE BY MOUTH TWICE A DAY, Disp: 60 capsule, Rfl: 0    dicyclomine (BENTYL) 10 MG capsule, TAKE 1 CAPSULE BY MOUTH TWICE A DAY AS NEEDED FOR ABDOMINAL SPASMS, Disp: 60 capsule, Rfl: 0    topiramate (TOPAMAX) 100 MG tablet, TAKE 1 TABLET BY MOUTH NIGHTLY , Disp: 90 tablet, Rfl: 2    clopidogrel (PLAVIX) 75 MG tablet, TAKE 1 TAB BY MOUTH ONCE A DAY ( IN THE MORNING ) -THIS MEDICINE MAY BE TAKENWITH OR WITHOUT FOOD , Disp: 90 tablet, Rfl: 1    metFORMIN (GLUCOPHAGE) 1000 MG tablet, TAKE 1 TAB BY MOUTH TWICE A DAY ( IN THE MORNING AND BEDTIME ) , Disp: 180 tablet, Rfl: 3    blood glucose test strips (ONETOUCH ULTRA) strip, TEST TWO (2) TO THREE (3) TIMES A DAY & AS NEEDED FOR SYMPTOMS OF IRREGULAR BLOOD GLUCOSE, Disp: 100 strip, Rfl: 0    blood glucose test strips (ONETOUCH ULTRA) strip, TEST TWO (2) TO THREE (3) TIMES A DAY & AS NEEDED FOR SYMPTOMS OF IRREGULAR BLOOD GLUCOSE, Disp: 100 strip, Rfl: 0    Blood Glucose Monitoring Suppl (ONE TOUCH ULTRA 2) w/Device KIT, , Disp: , Rfl:     ONETOUCH ULTRA strip, TEST TWO (2) TO THREE (3) TIMES A DAY& AS NEEDED , Disp: 100 each, Rfl: 0    atorvastatin (LIPITOR) 40 MG tablet, Take 1 tablet by mouth daily, Disp: 90 tablet, Rfl: 3    BiPAP Machine MISC, repair/replace Bipap maintain 18/9CMH2O, Cflex=3,mask,tubing,filters,headgear,heated humidifier,all supplies PRN, Disp: , Rfl:     Psyllium (METAMUCIL PO), Take by mouth 3 times daily Takes powder, Disp: , Rfl:     blood glucose test strips (TRUE METRIX BLOOD GLUCOSE TEST) strip, THREE TIMES A DAY, Disp: 100 each, Rfl: 3    albuterol (PROVENTIL) (2.5 MG/3ML) 0.083% nebulizer solution, Take 3 mLs by nebulization every 6 hours as needed for Wheezing, Disp: 120 each, Rfl: 3    ULTICARE MINI PEN NEEDLES 31G X 6 MM MISC, USE AS DIRECTED , Disp: 100 each, Rfl: 5    gabapentin (NEURONTIN) 300 MG capsule, Take 1 capsule by mouth 3 times daily for 30 days. (Patient taking differently: Take 300 mg by mouth 2 times daily. ), Disp: 90 capsule, Rfl: 2    nystatin (MYCOSTATIN) 872102 UNIT/GM powder, Apply 3 times daily. , Disp: 3 Bottle, Rfl: 3    lidocaine (LMX) 4 % cream, Apply topically every 8 hrs as needed for pain, Disp: 45 g, Rfl: 3    Lift Chair MISC, by Does not apply route, Disp: 1 each, Rfl: 0    Misc.  Devices (ADJUST BATH/SHOWER SEAT/BACK) MISC, Use daily for shower, Disp: 1 each, Rfl: 0    Alcohol Swabs (B-D SINGLE USE SWABS REGULAR) PADS, THREE TIMES A DAY, Disp: 100 each, Rfl: 0    nitroGLYCERIN (NITROSTAT) 0.4 MG SL tablet, 1 under the tongue as needed for angina, may repeat q5mins for up three doses, Disp: , Rfl:    Blood Glucose Monitoring Suppl HARMEET, Use daily to check BS, Disp: 1 Device, Rfl: 0    ipratropium-albuterol (DUONEB) 0.5-2.5 (3) MG/3ML SOLN nebulizer solution, Inhale 3 mLs into the lungs every 4 hours, Disp: 360 mL, Rfl: 2    Melatonin 10 MG TABS, Take 10 mg by mouth nightly, Disp: 90 tablet, Rfl: 3    Compression Stockings MISC, by Does not apply route Pressure between 20 - 25 ., Disp: 1 each, Rfl: 0    SYMBICORT 160-4.5 MCG/ACT AERO,  2 puffs As needed for sob, Disp: , Rfl:     OXYGEN, Inhale 3 L into the lungs , Disp: , Rfl:     Family History   Problem Relation Age of Onset    Diabetes Mother     Heart Disease Mother     Stomach Cancer Father     Diabetes Maternal Grandmother         also aunts and uncles (maternal)    Emphysema Sister        Social History     Socioeconomic History    Marital status: Legally      Spouse name: Not on file    Number of children: Not on file    Years of education: Not on file    Highest education level: Not on file   Occupational History    Occupation: disabled     Employer: N/A   Tobacco Use    Smoking status: Former Smoker     Packs/day: 3.00     Years: 41.00     Pack years: 123.00     Types: Cigarettes     Quit date: 2000     Years since quittin.2    Smokeless tobacco: Never Used    Tobacco comment: quit in    Vaping Use    Vaping Use: Never used   Substance and Sexual Activity    Alcohol use: No     Alcohol/week: 0.0 standard drinks    Drug use: No    Sexual activity: Not Currently   Other Topics Concern    Not on file   Social History Narrative    Not on file     Social Determinants of Health     Financial Resource Strain: Low Risk     Difficulty of Paying Living Expenses: Not hard at all   Food Insecurity: No Food Insecurity    Worried About Running Out of Food in the Last Year: Never true    Nadir of Food in the Last Year: Never true   Transportation Needs:     Lack of Transportation (Medical):  Not on file    Lack of Transportation (Non-Medical): Not on file   Physical Activity:     Days of Exercise per Week: Not on file    Minutes of Exercise per Session: Not on file   Stress:     Feeling of Stress : Not on file   Social Connections:     Frequency of Communication with Friends and Family: Not on file    Frequency of Social Gatherings with Friends and Family: Not on file    Attends Tenriism Services: Not on file    Active Member of [address] [place] [address] or Organizations: Not on file    Attends Club or Organization Meetings: Not on file    Marital Status: Not on file   Intimate Partner Violence:     Fear of Current or Ex-Partner: Not on file    Emotionally Abused: Not on file    Physically Abused: Not on file    Sexually Abused: Not on file   Housing Stability:     Unable to Pay for Housing in the Last Year: Not on file    Number of [place] in the Last Year: Not on file    Unstable Housing in the Last Year: Not on file       Review of Systems:  Review of Systems   Constitutional: Negative for chills and fever. HENT: Positive for ear pain. Cardiovascular: Negative. Negative for chest pain. Respiratory: Negative. Negative for cough and shortness of breath. Musculoskeletal: Positive for back pain. Gastrointestinal: Positive for nausea and vomiting. Negative for constipation. Neurological: Positive for numbness (occ.) and paresthesias. Physical Exam:  BP (!) 94/57   Pulse 86   Temp 96.6 °F (35.9 °C)   Resp 16   Ht 5' 2\" (1.575 m)   Wt 239 lb (108.4 kg)   LMP  (LMP Unknown)   SpO2 (!) 88%   BMI 43.71 kg/m²     Physical Exam  Cardiovascular:      Rate and Rhythm: Normal rate. Pulmonary:      Effort: Pulmonary effort is normal.   Musculoskeletal:      Lumbar back: Decreased range of motion. Comments: Using motorized scooter   Skin:     General: Skin is warm and dry. Neurological:      Mental Status: She is alert and oriented to person, place, and time.              Assessment:  Problem List Items Addressed This Visit     DDD (degenerative disc disease), cervical (Chronic)    Relevant Medications    oxyCODONE-acetaminophen (PERCOCET) 5-325 MG per tablet (Start on 4/9/2022)    Lumbosacral spondylosis without myelopathy (Chronic)    Relevant Medications    oxyCODONE-acetaminophen (PERCOCET) 5-325 MG per tablet (Start on 4/9/2022)    Sacroiliitis, not elsewhere classified ([address] (Chronic)    Relevant Medications    oxyCODONE-acetaminophen (PERCOCET) 5-325 MG per tablet (Start on 4/9/2022)    Lumbar radiculopathy, chronic    Relevant Medications    oxyCODONE-acetaminophen (PERCOCET) 5-325 MG per tablet (Start on 4/9/2022)             Treatment Plan:  Patient relates current medications are helping the pain. Patient reports taking pain medications as prescribed, denies obtaining medications from different sources and denies use of illegal drugs. Patient denies side effects from medications like nausea, vomiting, constipation or drowsiness. Patient reports current activities of daily living are possible due to medications and would like to continue them. As always, we encourage daily stretching and strengthening exercises, and recommend minimizing use of pain medications unless patient cannot get through daily activities due to pain. Contract requirements met. Continue opioid therapy. Script written for percocet  Follow up appointment made for 4 weeks    I have reviewed the chief complaint and history of present illness (including ROS and PFSH) and vital documentation by my staff and I agree with their documentation and have added where applicable.

## 2022-04-08 DIAGNOSIS — Z79.4 TYPE 2 DIABETES MELLITUS WITH DIABETIC POLYNEUROPATHY, WITH LONG-TERM CURRENT USE OF INSULIN (HCC): Chronic | ICD-10-CM

## 2022-04-08 DIAGNOSIS — E11.42 TYPE 2 DIABETES MELLITUS WITH DIABETIC POLYNEUROPATHY, WITH LONG-TERM CURRENT USE OF INSULIN (HCC): Chronic | ICD-10-CM

## 2022-04-11 RX ORDER — BLOOD SUGAR DIAGNOSTIC
STRIP MISCELLANEOUS
Qty: 100 STRIP | Refills: 0 | Status: SHIPPED | OUTPATIENT
Start: 2022-04-11 | End: 2022-07-25

## 2022-04-11 NOTE — TELEPHONE ENCOUNTER
Please Approve or Refuse.   Send to Pharmacy per Pt's Request:      Next Visit Date:  5/10/2022   Last Visit Date: 3/1/2022    Hemoglobin A1C (%)   Date Value   03/01/2022 12.5   02/25/2021 7.8   09/14/2020 10.6             ( goal A1C is < 7)   BP Readings from Last 3 Encounters:   04/07/22 (!) 94/57   03/09/22 111/60   03/01/22 134/70          (goal 120/80)  BUN   Date Value Ref Range Status   03/09/2022 15 8 - 23 mg/dL Final     CREATININE   Date Value Ref Range Status   03/09/2022 1.13 (H) 0.50 - 0.90 mg/dL Final     Potassium   Date Value Ref Range Status   03/09/2022 5.0 3.7 - 5.3 mmol/L Final

## 2022-04-20 DIAGNOSIS — D64.9 ANEMIA, UNSPECIFIED TYPE: ICD-10-CM

## 2022-04-20 RX ORDER — FERROUS SULFATE 325(65) MG
TABLET ORAL
Qty: 30 TABLET | Refills: 0 | Status: SHIPPED | OUTPATIENT
Start: 2022-04-20 | End: 2022-06-20

## 2022-04-25 ENCOUNTER — APPOINTMENT (OUTPATIENT)
Dept: GENERAL RADIOLOGY | Age: 72
DRG: 191 | End: 2022-04-25
Payer: COMMERCIAL

## 2022-04-25 ENCOUNTER — HOSPITAL ENCOUNTER (INPATIENT)
Age: 72
LOS: 6 days | Discharge: HOME HEALTH | DRG: 191 | End: 2022-05-01
Attending: EMERGENCY MEDICINE | Admitting: INTERNAL MEDICINE
Payer: COMMERCIAL

## 2022-04-25 DIAGNOSIS — J44.1 COPD EXACERBATION (HCC): Primary | ICD-10-CM

## 2022-04-25 LAB
ABSOLUTE EOS #: 0.17 K/UL (ref 0–0.44)
ABSOLUTE IMMATURE GRANULOCYTE: 0.04 K/UL (ref 0–0.3)
ABSOLUTE LYMPH #: 2.2 K/UL (ref 1.1–3.7)
ABSOLUTE MONO #: 0.66 K/UL (ref 0.1–1.2)
ALBUMIN SERPL-MCNC: 4.1 G/DL (ref 3.5–5.2)
ALBUMIN/GLOBULIN RATIO: 1.2 (ref 1–2.5)
ALP BLD-CCNC: 76 U/L (ref 35–104)
ALT SERPL-CCNC: 52 U/L (ref 5–33)
ANION GAP SERPL CALCULATED.3IONS-SCNC: 14 MMOL/L (ref 9–17)
AST SERPL-CCNC: 58 U/L
BASOPHILS # BLD: 1 % (ref 0–2)
BASOPHILS ABSOLUTE: 0.08 K/UL (ref 0–0.2)
BILIRUB SERPL-MCNC: 0.32 MG/DL (ref 0.3–1.2)
BILIRUBIN DIRECT: <0.08 MG/DL
BILIRUBIN, INDIRECT: ABNORMAL MG/DL (ref 0–1)
BUN BLDV-MCNC: 13 MG/DL (ref 8–23)
CALCIUM SERPL-MCNC: 9.2 MG/DL (ref 8.6–10.4)
CARBOXYHEMOGLOBIN: 2.7 % (ref 0–5)
CHLORIDE BLD-SCNC: 95 MMOL/L (ref 98–107)
CO2: 25 MMOL/L (ref 20–31)
CREAT SERPL-MCNC: 1.06 MG/DL (ref 0.5–0.9)
D-DIMER QUANTITATIVE: 0.64 MG/L FEU
EOSINOPHILS RELATIVE PERCENT: 2 % (ref 1–4)
FIO2: ABNORMAL
GFR AFRICAN AMERICAN: >60 ML/MIN
GFR NON-AFRICAN AMERICAN: 51 ML/MIN
GFR SERPL CREATININE-BSD FRML MDRD: ABNORMAL ML/MIN/{1.73_M2}
GLUCOSE BLD-MCNC: 300 MG/DL (ref 70–99)
GLUCOSE BLD-MCNC: 357 MG/DL (ref 65–105)
GLUCOSE BLD-MCNC: 501 MG/DL (ref 65–105)
HCO3 VENOUS: 25.8 MMOL/L (ref 24–30)
HCT VFR BLD CALC: 44.5 % (ref 36.3–47.1)
HEMOGLOBIN: 14.8 G/DL (ref 11.9–15.1)
IMMATURE GRANULOCYTES: 1 %
LIPASE: 52 U/L (ref 13–60)
LYMPHOCYTES # BLD: 28 % (ref 24–43)
MCH RBC QN AUTO: 31 PG (ref 25.2–33.5)
MCHC RBC AUTO-ENTMCNC: 33.3 G/DL (ref 28.4–34.8)
MCV RBC AUTO: 93.3 FL (ref 82.6–102.9)
MONOCYTES # BLD: 9 % (ref 3–12)
NEGATIVE BASE EXCESS, VEN: 0.5 MMOL/L (ref 0–2)
NRBC AUTOMATED: 0 PER 100 WBC
O2 SAT, VEN: 90.3 % (ref 60–85)
PATIENT TEMP: 37
PCO2, VEN: 51.6 (ref 39–55)
PDW BLD-RTO: 12.4 % (ref 11.8–14.4)
PH VENOUS: 7.32 (ref 7.32–7.42)
PLATELET # BLD: 208 K/UL (ref 138–453)
PMV BLD AUTO: 12.3 FL (ref 8.1–13.5)
PO2, VEN: 58.6 (ref 30–50)
POTASSIUM SERPL-SCNC: 3.8 MMOL/L (ref 3.7–5.3)
RBC # BLD: 4.77 M/UL (ref 3.95–5.11)
SEG NEUTROPHILS: 59 % (ref 36–65)
SEGMENTED NEUTROPHILS ABSOLUTE COUNT: 4.65 K/UL (ref 1.5–8.1)
SODIUM BLD-SCNC: 134 MMOL/L (ref 135–144)
TOTAL PROTEIN: 7.6 G/DL (ref 6.4–8.3)
TROPONIN, HIGH SENSITIVITY: 7 NG/L (ref 0–14)
TROPONIN, HIGH SENSITIVITY: 8 NG/L (ref 0–14)
WBC # BLD: 7.8 K/UL (ref 3.5–11.3)

## 2022-04-25 PROCEDURE — 82805 BLOOD GASES W/O2 SATURATION: CPT

## 2022-04-25 PROCEDURE — 1200000000 HC SEMI PRIVATE

## 2022-04-25 PROCEDURE — 82947 ASSAY GLUCOSE BLOOD QUANT: CPT

## 2022-04-25 PROCEDURE — 6360000002 HC RX W HCPCS: Performed by: STUDENT IN AN ORGANIZED HEALTH CARE EDUCATION/TRAINING PROGRAM

## 2022-04-25 PROCEDURE — 80048 BASIC METABOLIC PNL TOTAL CA: CPT

## 2022-04-25 PROCEDURE — 93005 ELECTROCARDIOGRAM TRACING: CPT | Performed by: STUDENT IN AN ORGANIZED HEALTH CARE EDUCATION/TRAINING PROGRAM

## 2022-04-25 PROCEDURE — 6370000000 HC RX 637 (ALT 250 FOR IP): Performed by: STUDENT IN AN ORGANIZED HEALTH CARE EDUCATION/TRAINING PROGRAM

## 2022-04-25 PROCEDURE — 80076 HEPATIC FUNCTION PANEL: CPT

## 2022-04-25 PROCEDURE — 36415 COLL VENOUS BLD VENIPUNCTURE: CPT

## 2022-04-25 PROCEDURE — 84484 ASSAY OF TROPONIN QUANT: CPT

## 2022-04-25 PROCEDURE — 94640 AIRWAY INHALATION TREATMENT: CPT

## 2022-04-25 PROCEDURE — 85025 COMPLETE CBC W/AUTO DIFF WBC: CPT

## 2022-04-25 PROCEDURE — 6360000002 HC RX W HCPCS: Performed by: NURSE PRACTITIONER

## 2022-04-25 PROCEDURE — 83690 ASSAY OF LIPASE: CPT

## 2022-04-25 PROCEDURE — 99222 1ST HOSP IP/OBS MODERATE 55: CPT | Performed by: NURSE PRACTITIONER

## 2022-04-25 PROCEDURE — 96374 THER/PROPH/DIAG INJ IV PUSH: CPT

## 2022-04-25 PROCEDURE — 99285 EMERGENCY DEPT VISIT HI MDM: CPT

## 2022-04-25 PROCEDURE — 71045 X-RAY EXAM CHEST 1 VIEW: CPT

## 2022-04-25 PROCEDURE — 85379 FIBRIN DEGRADATION QUANT: CPT

## 2022-04-25 PROCEDURE — 85027 COMPLETE CBC AUTOMATED: CPT

## 2022-04-25 RX ORDER — FENTANYL CITRATE 50 UG/ML
50 INJECTION, SOLUTION INTRAMUSCULAR; INTRAVENOUS ONCE
Status: COMPLETED | OUTPATIENT
Start: 2022-04-25 | End: 2022-04-25

## 2022-04-25 RX ORDER — TOPIRAMATE 100 MG/1
100 TABLET, FILM COATED ORAL NIGHTLY
Status: DISCONTINUED | OUTPATIENT
Start: 2022-04-25 | End: 2022-05-01 | Stop reason: HOSPADM

## 2022-04-25 RX ORDER — INSULIN LISPRO 100 [IU]/ML
0-12 INJECTION, SOLUTION INTRAVENOUS; SUBCUTANEOUS
Status: DISCONTINUED | OUTPATIENT
Start: 2022-04-26 | End: 2022-04-26

## 2022-04-25 RX ORDER — DEXTROSE MONOHYDRATE 25 G/50ML
12.5 INJECTION, SOLUTION INTRAVENOUS PRN
Status: DISCONTINUED | OUTPATIENT
Start: 2022-04-25 | End: 2022-05-01 | Stop reason: HOSPADM

## 2022-04-25 RX ORDER — LANOLIN ALCOHOL/MO/W.PET/CERES
325 CREAM (GRAM) TOPICAL
Status: DISCONTINUED | OUTPATIENT
Start: 2022-04-26 | End: 2022-05-01 | Stop reason: HOSPADM

## 2022-04-25 RX ORDER — SODIUM CHLORIDE 9 MG/ML
INJECTION, SOLUTION INTRAVENOUS PRN
Status: DISCONTINUED | OUTPATIENT
Start: 2022-04-25 | End: 2022-05-01 | Stop reason: HOSPADM

## 2022-04-25 RX ORDER — MAGNESIUM SULFATE IN WATER 40 MG/ML
2000 INJECTION, SOLUTION INTRAVENOUS ONCE
Status: COMPLETED | OUTPATIENT
Start: 2022-04-25 | End: 2022-04-25

## 2022-04-25 RX ORDER — SODIUM CHLORIDE 0.9 % (FLUSH) 0.9 %
5-40 SYRINGE (ML) INJECTION PRN
Status: DISCONTINUED | OUTPATIENT
Start: 2022-04-25 | End: 2022-05-01 | Stop reason: HOSPADM

## 2022-04-25 RX ORDER — LANOLIN ALCOHOL/MO/W.PET/CERES
400 CREAM (GRAM) TOPICAL DAILY
Status: DISCONTINUED | OUTPATIENT
Start: 2022-04-26 | End: 2022-05-01 | Stop reason: HOSPADM

## 2022-04-25 RX ORDER — IPRATROPIUM BROMIDE AND ALBUTEROL SULFATE 2.5; .5 MG/3ML; MG/3ML
1 SOLUTION RESPIRATORY (INHALATION) EVERY 4 HOURS PRN
Status: DISCONTINUED | OUTPATIENT
Start: 2022-04-25 | End: 2022-05-01 | Stop reason: HOSPADM

## 2022-04-25 RX ORDER — DEXTROSE MONOHYDRATE 50 MG/ML
100 INJECTION, SOLUTION INTRAVENOUS PRN
Status: DISCONTINUED | OUTPATIENT
Start: 2022-04-25 | End: 2022-05-01 | Stop reason: HOSPADM

## 2022-04-25 RX ORDER — UBIDECARENONE 75 MG
50 CAPSULE ORAL DAILY
Status: DISCONTINUED | OUTPATIENT
Start: 2022-04-26 | End: 2022-05-01 | Stop reason: HOSPADM

## 2022-04-25 RX ORDER — ONDANSETRON 2 MG/ML
4 INJECTION INTRAMUSCULAR; INTRAVENOUS EVERY 6 HOURS PRN
Status: DISCONTINUED | OUTPATIENT
Start: 2022-04-25 | End: 2022-05-01 | Stop reason: HOSPADM

## 2022-04-25 RX ORDER — OMEGA-3-ACID ETHYL ESTERS 1 G/1
1 CAPSULE, LIQUID FILLED ORAL DAILY
Status: DISCONTINUED | OUTPATIENT
Start: 2022-04-26 | End: 2022-05-01 | Stop reason: HOSPADM

## 2022-04-25 RX ORDER — ASPIRIN 81 MG/1
81 TABLET ORAL DAILY
Status: DISCONTINUED | OUTPATIENT
Start: 2022-04-26 | End: 2022-05-01 | Stop reason: HOSPADM

## 2022-04-25 RX ORDER — CARVEDILOL 3.12 MG/1
3.12 TABLET ORAL 2 TIMES DAILY WITH MEALS
Status: DISCONTINUED | OUTPATIENT
Start: 2022-04-26 | End: 2022-05-01 | Stop reason: HOSPADM

## 2022-04-25 RX ORDER — OXYBUTYNIN CHLORIDE 5 MG/1
5 TABLET, EXTENDED RELEASE ORAL DAILY
Status: DISCONTINUED | OUTPATIENT
Start: 2022-04-26 | End: 2022-05-01 | Stop reason: HOSPADM

## 2022-04-25 RX ORDER — INSULIN LISPRO 100 [IU]/ML
0-6 INJECTION, SOLUTION INTRAVENOUS; SUBCUTANEOUS NIGHTLY
Status: DISCONTINUED | OUTPATIENT
Start: 2022-04-25 | End: 2022-04-26

## 2022-04-25 RX ORDER — INSULIN GLARGINE 100 [IU]/ML
55 INJECTION, SOLUTION SUBCUTANEOUS 2 TIMES DAILY
Status: DISCONTINUED | OUTPATIENT
Start: 2022-04-25 | End: 2022-04-26

## 2022-04-25 RX ORDER — PREDNISONE 20 MG/1
20 TABLET ORAL DAILY
Status: DISCONTINUED | OUTPATIENT
Start: 2022-04-26 | End: 2022-04-26

## 2022-04-25 RX ORDER — METHYLPREDNISOLONE SODIUM SUCCINATE 125 MG/2ML
125 INJECTION, POWDER, LYOPHILIZED, FOR SOLUTION INTRAMUSCULAR; INTRAVENOUS ONCE
Status: COMPLETED | OUTPATIENT
Start: 2022-04-25 | End: 2022-04-25

## 2022-04-25 RX ORDER — IBUPROFEN 400 MG/1
600 TABLET ORAL EVERY 8 HOURS PRN
Status: DISCONTINUED | OUTPATIENT
Start: 2022-04-25 | End: 2022-04-27

## 2022-04-25 RX ORDER — M-VIT,TX,IRON,MINS/CALC/FOLIC 27MG-0.4MG
1 TABLET ORAL DAILY
Status: DISCONTINUED | OUTPATIENT
Start: 2022-04-26 | End: 2022-05-01 | Stop reason: HOSPADM

## 2022-04-25 RX ORDER — ISOSORBIDE DINITRATE 10 MG/1
30 TABLET ORAL DAILY
Status: DISCONTINUED | OUTPATIENT
Start: 2022-04-26 | End: 2022-05-01 | Stop reason: HOSPADM

## 2022-04-25 RX ORDER — SODIUM CHLORIDE 0.9 % (FLUSH) 0.9 %
5-40 SYRINGE (ML) INJECTION EVERY 12 HOURS SCHEDULED
Status: DISCONTINUED | OUTPATIENT
Start: 2022-04-25 | End: 2022-05-01 | Stop reason: HOSPADM

## 2022-04-25 RX ORDER — ATORVASTATIN CALCIUM 80 MG/1
40 TABLET, FILM COATED ORAL DAILY
Status: DISCONTINUED | OUTPATIENT
Start: 2022-04-26 | End: 2022-05-01 | Stop reason: HOSPADM

## 2022-04-25 RX ORDER — AZITHROMYCIN 250 MG/1
500 TABLET, FILM COATED ORAL DAILY
Status: COMPLETED | OUTPATIENT
Start: 2022-04-26 | End: 2022-04-28

## 2022-04-25 RX ORDER — CHOLECALCIFEROL (VITAMIN D3) 125 MCG
10 CAPSULE ORAL NIGHTLY PRN
Status: DISCONTINUED | OUTPATIENT
Start: 2022-04-25 | End: 2022-05-01 | Stop reason: HOSPADM

## 2022-04-25 RX ORDER — OXYCODONE HYDROCHLORIDE 5 MG/1
5 TABLET ORAL ONCE
Status: COMPLETED | OUTPATIENT
Start: 2022-04-25 | End: 2022-04-25

## 2022-04-25 RX ORDER — BUDESONIDE AND FORMOTEROL FUMARATE DIHYDRATE 160; 4.5 UG/1; UG/1
2 AEROSOL RESPIRATORY (INHALATION) DAILY
Status: DISCONTINUED | OUTPATIENT
Start: 2022-04-26 | End: 2022-05-01 | Stop reason: HOSPADM

## 2022-04-25 RX ORDER — PANTOPRAZOLE SODIUM 40 MG/1
40 TABLET, DELAYED RELEASE ORAL ONCE
Status: COMPLETED | OUTPATIENT
Start: 2022-04-25 | End: 2022-04-25

## 2022-04-25 RX ORDER — DOCUSATE SODIUM 100 MG/1
100 CAPSULE, LIQUID FILLED ORAL 2 TIMES DAILY PRN
Status: DISCONTINUED | OUTPATIENT
Start: 2022-04-25 | End: 2022-05-01 | Stop reason: HOSPADM

## 2022-04-25 RX ORDER — PANTOPRAZOLE SODIUM 40 MG/1
40 TABLET, DELAYED RELEASE ORAL 2 TIMES DAILY
Status: DISCONTINUED | OUTPATIENT
Start: 2022-04-25 | End: 2022-05-01 | Stop reason: HOSPADM

## 2022-04-25 RX ORDER — FENOFIBRATE 160 MG/1
160 TABLET ORAL DAILY
Status: DISCONTINUED | OUTPATIENT
Start: 2022-04-26 | End: 2022-05-01 | Stop reason: HOSPADM

## 2022-04-25 RX ORDER — CITALOPRAM 20 MG/1
20 TABLET ORAL DAILY
Status: DISCONTINUED | OUTPATIENT
Start: 2022-04-26 | End: 2022-05-01 | Stop reason: HOSPADM

## 2022-04-25 RX ORDER — LOSARTAN POTASSIUM 25 MG/1
25 TABLET ORAL DAILY
Status: DISCONTINUED | OUTPATIENT
Start: 2022-04-26 | End: 2022-05-01 | Stop reason: HOSPADM

## 2022-04-25 RX ORDER — NICOTINE POLACRILEX 4 MG
15 LOZENGE BUCCAL PRN
Status: DISCONTINUED | OUTPATIENT
Start: 2022-04-25 | End: 2022-05-01 | Stop reason: HOSPADM

## 2022-04-25 RX ORDER — ONDANSETRON 4 MG/1
4 TABLET, ORALLY DISINTEGRATING ORAL EVERY 8 HOURS PRN
Status: DISCONTINUED | OUTPATIENT
Start: 2022-04-25 | End: 2022-05-01 | Stop reason: HOSPADM

## 2022-04-25 RX ORDER — CLOPIDOGREL BISULFATE 75 MG/1
75 TABLET ORAL DAILY
Status: DISCONTINUED | OUTPATIENT
Start: 2022-04-26 | End: 2022-05-01 | Stop reason: HOSPADM

## 2022-04-25 RX ORDER — ALBUTEROL SULFATE 2.5 MG/3ML
2.5 SOLUTION RESPIRATORY (INHALATION)
Status: DISCONTINUED | OUTPATIENT
Start: 2022-04-25 | End: 2022-05-01 | Stop reason: HOSPADM

## 2022-04-25 RX ADMIN — IPRATROPIUM BROMIDE AND ALBUTEROL SULFATE 1 AMPULE: .5; 3 SOLUTION RESPIRATORY (INHALATION) at 13:40

## 2022-04-25 RX ADMIN — PANTOPRAZOLE SODIUM 40 MG: 40 TABLET, DELAYED RELEASE ORAL at 14:01

## 2022-04-25 RX ADMIN — ONDANSETRON 4 MG: 2 INJECTION INTRAMUSCULAR; INTRAVENOUS at 22:51

## 2022-04-25 RX ADMIN — OXYCODONE HYDROCHLORIDE 5 MG: 5 TABLET ORAL at 13:58

## 2022-04-25 RX ADMIN — FENTANYL CITRATE 50 MCG: 50 INJECTION INTRAMUSCULAR; INTRAVENOUS at 19:44

## 2022-04-25 RX ADMIN — MAGNESIUM SULFATE HEPTAHYDRATE 2000 MG: 40 INJECTION, SOLUTION INTRAVENOUS at 14:48

## 2022-04-25 RX ADMIN — FENTANYL CITRATE 50 MCG: 50 INJECTION, SOLUTION INTRAMUSCULAR; INTRAVENOUS at 14:52

## 2022-04-25 RX ADMIN — METHYLPREDNISOLONE SODIUM SUCCINATE 125 MG: 125 INJECTION, POWDER, FOR SOLUTION INTRAMUSCULAR; INTRAVENOUS at 14:33

## 2022-04-25 ASSESSMENT — ENCOUNTER SYMPTOMS
COUGH: 1
RHINORRHEA: 0
WHEEZING: 1
SORE THROAT: 0
BACK PAIN: 0
DIARRHEA: 0
SHORTNESS OF BREATH: 1
BLOOD IN STOOL: 0
CONSTIPATION: 0
NAUSEA: 0
PHOTOPHOBIA: 0
CHEST TIGHTNESS: 1
ABDOMINAL PAIN: 0
CHEST TIGHTNESS: 0
EYE REDNESS: 0
SINUS PRESSURE: 0
VOMITING: 0

## 2022-04-25 ASSESSMENT — PAIN DESCRIPTION - LOCATION: LOCATION: CHEST

## 2022-04-25 ASSESSMENT — PAIN - FUNCTIONAL ASSESSMENT: PAIN_FUNCTIONAL_ASSESSMENT: 0-10

## 2022-04-25 ASSESSMENT — PAIN SCALES - GENERAL
PAINLEVEL_OUTOF10: 9
PAINLEVEL_OUTOF10: 8

## 2022-04-25 NOTE — ED NOTES
Pt presents to the ED with C/O having difficulty breathing. Pt stated this started this AM. Pt stated that she wears O2 PRN. Pt stated that EMS did one albuterol treatment. Pt did not take home medication today. Pt has a HX of Diabetes type 2, pt takes insulin. Pt has a HX of Emphysema, HTN. Pt placed on full cardiac monitor. Pt arrived alert and oriented x 4. EKG obtained. IV access attempted. Awaiting IV. Will continue to monitor and reassess.         Luisa Baer RN  04/25/22 [id number]

## 2022-04-25 NOTE — ED NOTES
Medicated for pain in left lung, 8/10  Alert and oriented, call light with patient, side rails up for safety     Shanae Mancuso  04/25/22 1950

## 2022-04-25 NOTE — ED NOTES
Pt resting comfortably in no acute distress. Respirations even and unlabored. Call light remains within reach. No needs at this time.        Tabby Shukla RN  04/25/22 0423

## 2022-04-25 NOTE — ED PROVIDER NOTES
[address]  ED  Emergency Department Encounter  EmergencyMedicine Resident     Pt Name:Mariangel Frank  MRN: [id number]  [place] 1950  Date of evaluation: 4/25/22  PCP:  Simi Yao MD    This patient was evaluated in the Emergency Department for symptoms described in the history of present illness. The patient was evaluated in the context of the global COVID-19 pandemic, which necessitated consideration that the patient might be at risk for infection with the SARS-CoV-2 virus that causes COVID-19. Institutional protocols and algorithms that pertain to the evaluation of patients at risk for COVID-19 are in a state of rapid change based on information released by regulatory bodies including the CDC and federal and state organizations. These policies and algorithms were followed during the patient's care in the ED. CHIEF COMPLAINT       Chief Complaint   Patient presents with    Shortness of Breath       HISTORY OF PRESENT ILLNESS  (Location/Symptom, Timing/Onset, Context/Setting, Quality, Duration, Modifying Factors, Severity.)      Jose Alfredo Argueta is a 66 y.o. female who presents with shortness of breath. Patient presents with 2 to 3 days of progressive worsening shortness of breath, associated with anterior chest pain, sharp, radiating to her back, moderate to severe, associated with dry cough. Patient also reports epigastric pain, sharp, nonradiating, burning, worse with lying back, consistent with her history of GERD, has been out of her Prilosec recently. Patient denies any fevers, chills, congestion, rhinorrhea, sore throat, nausea, vomiting, diarrhea, constipation, lower extremity swelling or pain. Patient has history of COPD, has been hospitalized for exacerbations, never intubated. Patient also has history of DM, HTN, HLD, CAD. Patient denies history of DVT/PE, is not on anticoagulation medication.     PAST MEDICAL / SURGICAL / SOCIAL / FAMILY HISTORY      has a past medical history of Abdominal bloating, Adenomatous polyp of ascending colon, Allergic rhinitis, Anxiety, Arthritis, Asthma, Atrial fibrillation (HCC), Back pain, Benign hypertension with CKD (chronic kidney disease) stage III ([address] CAD (coronary artery disease), Caffeine use, CHF (congestive heart failure) ([address] Chronic kidney disease, CKD (chronic kidney disease) stage 3, GFR 30-59 ml/min ([facility]), COPD (chronic obstructive pulmonary disease) ([address] Degeneration of lumbar or lumbosacral intervertebral disc, Depression, DM (diabetes mellitus) ([address] Emphysema of lung ([address] Gastritis, GERD (gastroesophageal reflux disease), Hearing loss, Hematuria, Hiatal hernia, HTN (hypertension), benign, Hypertriglyceridemia, Incontinence of urine, Irritable bowel syndrome with both constipation and diarrhea, Knee arthropathy, Lumbosacral spondylosis without myelopathy, Migraine headache, Mumps, Neuropathy, Obesity, On home oxygen therapy, HIGINIO on CPAP, Otitis media, Secondary diabetes mellitus with stage 3 chronic kidney disease (GFR 30-59) ([address] Stroke ([address] Tinnitus, Type 2 diabetes mellitus without complication ([address] Type II or unspecified type diabetes mellitus without mention of complication, not stated as uncontrolled, UTI (lower urinary tract infection), and Varicose vein. has a past surgical history that includes Cholecystectomy (2007); Carpal tunnel release (Right); Tympanoplasty; Dilation & curettage (1979); Cystocopy (9/25/2013); Cataract removal with implant (Bilateral); Tonsillectomy; Incontinence surgery; ablation of dysrhythmic focus (2000); Upper gastrointestinal endoscopy (03/2016); eye surgery; Tubal ligation; other surgical history (09/10/15); Insertable Cardiac Monitor (12/04/2015); Tympanoplasty; Colonoscopy; Colonoscopy (04/10/2017); pr colsc flx w/removal lesion by hot bx forceps (N/A, 4/10/2017); Tympanostomy tube placement (08/21/2017);  Upper gastrointestinal endoscopy (N/A, 3/2/2021); and Colonoscopy (N/A, 3/2/2021). Social History     Socioeconomic History    Marital status: Legally      Spouse name: Not on file    Number of children: Not on file    Years of education: Not on file    Highest education level: Not on file   Occupational History    Occupation: disabled     Employer: N/A   Tobacco Use    Smoking status: Former Smoker     Packs/day: 3.00     Years: 41.00     Pack years: 123.00     Types: Cigarettes     Quit date: 2000     Years since quittin.3    Smokeless tobacco: Never Used    Tobacco comment: quit in    Vaping Use    Vaping Use: Never used   Substance and Sexual Activity    Alcohol use: No     Alcohol/week: 0.0 standard drinks    Drug use: No    Sexual activity: Not Currently   Other Topics Concern    Not on file   Social History Narrative    Not on file     Social Determinants of Health     Financial Resource Strain: Low Risk     Difficulty of Paying Living Expenses: Not hard at all   Food Insecurity: No Food Insecurity    Worried About [address] in the Last Year: Never true    [facility] in the Last Year: Never true   Transportation Needs:     Lack of Transportation (Medical): Not on file    Lack of Transportation (Non-Medical):  Not on file   Physical Activity:     Days of Exercise per Week: Not on file    Minutes of Exercise per Session: Not on file   Stress:     Feeling of Stress : Not on file   Social Connections:     Frequency of Communication with Friends and Family: Not on file    Frequency of Social Gatherings with Friends and Family: Not on file    Attends Mandaeism Services: Not on file    Active Member of Clubs or Organizations: Not on file    Attends Club or Organization Meetings: Not on file    Marital Status: Not on file   Intimate Partner Violence:     Fear of Current or Ex-Partner: Not on file    Emotionally Abused: Not on file    Physically Abused: Not on file    Sexually Abused: Not on file Housing Stability:     Unable to Pay for Housing in the Last Year: Not on file    Number of Places Lived in the Last Year: Not on file    Unstable Housing in the Last Year: Not on file       Family History   Problem Relation Age of Onset    Diabetes Mother     Heart Disease Mother     Stomach Cancer Father     Diabetes Maternal Grandmother         also aunts and uncles (maternal)    Emphysema Sister        Allergies:  Robitussin [guaifenesin], Codeine, Compazine [prochlorperazine maleate], Iodides, Morphine, Moxifloxacin, Reglan [metoclopramide], Avelox [moxifloxacin hcl in nacl], Cipro xr, and Sulfa antibiotics    Home Medications:  Prior to Admission medications    Medication Sig Start Date End Date Taking? Authorizing Provider   FEROSUL 325 (65 Fe) MG tablet TAKE 1 TAB BY MOUTH IN THE MORNING 4/20/22   Ad Esteban MD   ONETOUCH ULTRA strip TEST TWO (2) TO THREE (3) TIMES A DAY & AS NEEDED FOR SYMPTOMS OF IRREGULAR BLOOD GLUCOSE 4/11/22   Ad Esteban MD   oxyCODONE-acetaminophen (PERCOCET) 5-325 MG per tablet Take 1 tablet by mouth See Admin Instructions for 30 days. Intended supply: 30 days. 1 tab 3-4 times a day prn 4/9/22 5/9/22  VERONIKA Costa - CNP   pantoprazole (PROTONIX) 40 MG tablet TAKE 1 TABLET BY MOUTH TWICE A DAY 3/31/22   Ad Esteban MD   oxybutynin (DITROPAN-XL) 5 MG extended release tablet TAKE 1 TABLET BY MOUTH DAILY 3/30/22   Ad Esteban MD   fenofibrate (TRIGLIDE) 160 MG tablet TAKE 1 TABLET BY MOUTH DAILY 3/30/22   Ad Esteban MD   insulin aspart (NOVOLOG FLEXPEN) 100 UNIT/ML injection pen IF<139 NO INSULIN; 140-199-2 UN;200-249-4 UN;250-299-6 TZ;313-906-7 UN;350-400=10 UN;ABOVE 400-12 UNIT(S) 3/30/22   Ad Esteban MD   magnesium oxide (MAG-OX) 400 MG tablet  2/21/22   Historical Provider, MD   zoster recombinant adjuvanted vaccine (SHINGRIX) 50 MCG/0.5ML SUSR injection 50 MCG IM then repeat 2-6 months.  3/1/22 3/1/23  Ad Esteban MD   insulin glargine Becca Rojo KWIKPEN) 100 UNIT/ML injection pen Inject 55 Units into the skin 2 times daily 3/1/22   Rancho Cardoza MD   Dulaglutide (TRULICITY) 3.05 MN/5.9VH SOPN Inject 0.75 mg into the skin every 7 days If pen needle is needed, please request 3/1/22   Rancho Cardoza MD   Continuous Blood Gluc Sensor (DEXCOM G6 SENSOR) MISC Use daily for checking blood sugars 3/1/22   Rancho Cardoza MD   losartan (COZAAR) 25 MG tablet TAKE 1 TABLET BY MOUTH ONCE DAILY 2/21/22   Rancho Cardoza MD   ibuprofen (ADVIL;MOTRIN) 600 MG tablet Take 1 tablet by mouth 3 times daily as needed for Pain 2/1/22   Malachi Patel MD   carvedilol (COREG) 3.125 MG tablet TAKE 1 TAB BY MOUTH TWICE A DAY ( IN THE MORNING AND BEDTIME ) 1/21/22   Rancho Cardoza MD   ketoconazole (NIZORAL) 2 % cream Apply topically  2 times a day.  12/29/21   Rancho Cardoza MD   citalopram (CELEXA) 20 MG tablet TAKE 1 TABLET BY MOUTH DAILY NEEDS TO DECREASE THE CELEXA TO 20 MG DUE TO SIDE EFFECTS ON THE HEART 12/8/21   Rancho Cardoza MD   aspirin 81 MG EC tablet TAKE 1 TABLET BY MOUTH ONCE DAILY 12/8/21   Rancho Cardoza MD   docusate sodium (COLACE) 100 MG capsule TAKE 1 CAPSULE BY MOUTH TWICE A DAY 12/8/21   Rancho Cardoza MD   Multiple Vitamins-Minerals (CERTAVITE/ANTIOXIDANTS) TABS TAKE 1 TABLET BY MOUTH ONCE DAILY 12/8/21   Rancho Cardoza MD   isosorbide dinitrate (ISORDIL) 30 MG tablet TAKE 1 TABLET BY MOUTH ONCE DAILY 12/8/21   Rancho Cardoza MD   vitamin B-12 (CYANOCOBALAMIN) 100 MCG tablet take half a tablet BY MOUTH ONCE DAILY 11/24/21   Aleta Gonzalez MD   furosemide (LASIX) 20 MG tablet TAKE 1 TABLET BY MOUTH ONCE DAILY AS NEEDED 11/24/21   Altea Gonzalez MD   Icosapent Ethyl (VASCEPA) 1 g CAPS capsule TAKE 1 CAPSULE BY MOUTH TWICE A DAY 11/24/21   Aleta Gonzalez MD   dicyclomine (BENTYL) 10 MG capsule TAKE 1 CAPSULE BY MOUTH TWICE A DAY AS NEEDED FOR ABDOMINAL SPASMS 11/24/21   Aleta Gonzalez MD   topiramate (TOPAMAX) 100 MG tablet TAKE 1 TABLET BY MOUTH NIGHTLY  10/25/21   Nina Bustos MD   clopidogrel (PLAVIX) 75 MG tablet TAKE 1 TAB BY MOUTH ONCE A DAY ( IN THE MORNING ) -THIS MEDICINE MAY BE TAKENWITH OR WITHOUT FOOD  10/25/21   Sofie Lopez MD   metFORMIN (GLUCOPHAGE) 1000 MG tablet TAKE 1 TAB BY MOUTH TWICE A DAY ( IN THE MORNING AND BEDTIME )  10/1/21   Sofie Lopez MD   blood glucose test strips (ONETOUCH ULTRA) strip TEST TWO (2) TO THREE (3) TIMES A DAY & AS NEEDED FOR SYMPTOMS OF IRREGULAR BLOOD GLUCOSE 8/23/21   Sofie Lopez MD   Blood Glucose Monitoring Suppl (ONE TOUCH ULTRA 2) w/Device KIT  2/10/21   Historical Provider, MD   ONETOUCH ULTRA strip TEST TWO (2) TO THREE (3) TIMES A DAY& AS NEEDED  3/5/21   Sofie Lopez MD   atorvastatin (LIPITOR) 40 MG tablet Take 1 tablet by mouth daily 2/28/21   Sofie Lopez MD   BiPAP Machine MISC repair/replace Bipap maintain 18/9CMH2O, Cflex=3,mask,tubing,filters,headgear,heated humidifier,all supplies PRN 1/28/21   Historical Provider, MD   Psyllium (METAMUCIL PO) Take by mouth 3 times daily Takes powder    Historical Provider, MD   blood glucose test strips (TRUE METRIX BLOOD GLUCOSE TEST) strip THREE TIMES A DAY 1/21/21   Sofie Lopez MD   albuterol (PROVENTIL) (2.5 MG/3ML) 0.083% nebulizer solution Take 3 mLs by nebulization every 6 hours as needed for Wheezing 11/3/20   Griselda Liz MD   ULTICARE MINI PEN NEEDLES 31G X 6 MM MISC USE AS DIRECTED  8/14/20   Sofie Lopez MD   gabapentin (NEURONTIN) 300 MG capsule Take 1 capsule by mouth 3 times daily for 30 days. Patient taking differently: Take 300 mg by mouth 2 times daily. 7/30/20 3/9/22  Katie Rizzo APRN - CNP   nystatin (MYCOSTATIN) 128670 UNIT/GM powder Apply 3 times daily. 6/25/20   Sofie Lopez MD   lidocaine (LMX) 4 % cream Apply topically every 8 hrs as needed for pain 11/11/19   Sofie Lopez MD   Lift Chair MISC by Does not apply route 9/24/19   Sofie Lopez MD   Misc.  Devices (ADJUST BATH/SHOWER SEAT/BACK) MISC Use daily for shower 9/24/19   Sofie Lopez MD   Alcohol Swabs (B-D SINGLE USE SWABS REGULAR) PADS THREE TIMES A DAY 12/12/18   Sofie Lopez MD   nitroGLYCERIN (NITROSTAT) 0.4 MG SL tablet 1 under the tongue as needed for angina, may repeat q5mins for up three doses 8/28/18   Historical Provider, MD   Blood Glucose Monitoring Suppl HARMEET Use daily to check BS 3/27/18   Sofie Lopez MD   ipratropium-albuterol (DUONEB) 0.5-2.5 (3) MG/3ML SOLN nebulizer solution Inhale 3 mLs into the lungs every 4 hours 2/6/18   Baron Susan MD   Melatonin 10 MG TABS Take 10 mg by mouth nightly 10/19/17   Sofie Lopez MD   Compression Stockings MISC by Does not apply route Pressure between 20 - 25 . 9/11/17   Sofie Lopez MD   SYMBICORT 160-4.5 MCG/ACT AERO   2 puffs As needed for sob 5/28/15   Historical Provider, MD   OXYGEN Inhale 3 L into the lungs     Historical Provider, MD       REVIEW OF SYSTEMS    (2-9 systems for level 4, 10 or more for level 5)      Review of Systems   Constitutional: Negative for chills, diaphoresis, fatigue and fever. HENT: Negative for congestion, rhinorrhea and sore throat. Eyes: Negative for visual disturbance. Respiratory: Positive for cough, shortness of breath and wheezing. Negative for chest tightness. Cardiovascular: Positive for chest pain. Negative for palpitations and leg swelling. Gastrointestinal: Negative for abdominal pain, blood in stool, constipation, diarrhea, nausea and vomiting. Genitourinary: Negative for dysuria and hematuria. Musculoskeletal: Negative for neck pain and neck stiffness. Skin: Negative for pallor and rash. Neurological: Negative for dizziness, syncope, weakness, light-headedness and headaches. Psychiatric/Behavioral: Negative for confusion.        PHYSICAL EXAM   (up to 7 for level 4, 8 or more for level 5)      INITIAL VITALS:   BP (!) 148/72   Pulse 96   Temp 98 °F (36.7 °C) (Oral)   Resp 14   Ht 5' 2\" (1.575 m)   Wt 234 lb (106.1 kg)   LMP  (LMP Unknown)   SpO2 91%   BMI 42.80 kg/m²     Physical Exam  Constitutional:       General: She is not in acute distress. Appearance: She is well-developed. She is not toxic-appearing or diaphoretic. Comments: Patient is alert and oriented, not speaking in full sentences, but more than a few words, appears moderately short of breath   HENT:      Head: Normocephalic and atraumatic. Right Ear: External ear normal.      Left Ear: External ear normal.   Eyes:      General:         Right eye: No discharge. Left eye: No discharge. Extraocular Movements: Extraocular movements intact. Pupils: Pupils are equal, round, and reactive to light. Neck:      Vascular: No JVD. Trachea: No tracheal deviation. Cardiovascular:      Rate and Rhythm: Normal rate and regular rhythm. Pulses: Normal pulses. Heart sounds: Normal heart sounds. No murmur heard. No friction rub. No gallop. Pulmonary:      Effort: No respiratory distress. Breath sounds: No stridor. Wheezing present. No rhonchi or rales. Comments: Patient has inspiratory and expiratory wheezing in all lung fields, appears moderately short of breath  Chest:      Chest wall: No tenderness. Abdominal:      General: There is no distension. Palpations: Abdomen is soft. There is no mass. Tenderness: There is no abdominal tenderness. There is no right CVA tenderness, left CVA tenderness or guarding. Musculoskeletal:         General: Normal range of motion. Cervical back: Normal range of motion and neck supple. No rigidity or tenderness. Right lower leg: No edema. Left lower leg: No edema. Skin:     General: Skin is warm. Capillary Refill: Capillary refill takes less than 2 seconds. Findings: No rash. Neurological:      General: No focal deficit present. Mental Status: She is alert and oriented to person, place, and time.       Cranial Nerves: No cranial nerve deficit. Sensory: No sensory deficit. Motor: No weakness. Coordination: Coordination normal.      Gait: Gait normal.   Psychiatric:         Behavior: Behavior normal.         DIFFERENTIAL  DIAGNOSIS     PLAN (LABS / IMAGING / EKG):  Orders Placed This Encounter   Procedures    XR CHEST PORTABLE    Basic Metabolic Panel w/ Reflex to MG    Troponin    Blood Gas, Venous    D-Dimer, Quantitative    Lipase    Hepatic Function Panel    CBC    Differential    Inpatient consult to Hospitalist    Initiate ED RT Aerosol protocol    EKG 12 Lead    ADMIT TO INPATIENT       MEDICATIONS ORDERED:  Orders Placed This Encounter   Medications    albuterol (PROVENTIL) nebulizer solution 2.5 mg     Order Specific Question:   Initiate RT Bronchodilator Protocol     Answer: Yes    ipratropium-albuterol (DUONEB) nebulizer solution 1 ampule     Order Specific Question:   Initiate RT Bronchodilator Protocol     Answer:    Yes    magnesium sulfate 2000 mg in 50 mL IVPB premix    methylPREDNISolone sodium (SOLU-MEDROL) injection 125 mg    fentaNYL (SUBLIMAZE) injection 50 mcg    oxyCODONE (ROXICODONE) immediate release tablet 5 mg    pantoprazole (PROTONIX) tablet 40 mg    OR Linked Order Group     ondansetron (ZOFRAN-ODT) disintegrating tablet 4 mg     ondansetron (ZOFRAN) injection 4 mg    fentaNYL (SUBLIMAZE) injection 50 mcg       DDX: COPD exacerbation, ACS, heart failure, PE    DIAGNOSTIC RESULTS / EMERGENCY DEPARTMENT COURSE / MDM   LAB RESULTS:  Results for orders placed or performed during the hospital encounter of 59/10/61   Basic Metabolic Panel w/ Reflex to MG   Result Value Ref Range    Glucose 300 (H) 70 - 99 mg/dL    BUN 13 8 - 23 mg/dL    CREATININE 1.06 (H) 0.50 - 0.90 mg/dL    Calcium 9.2 8.6 - 10.4 mg/dL    Sodium 134 (L) 135 - 144 mmol/L    Potassium 3.8 3.7 - 5.3 mmol/L    Chloride 95 (L) 98 - 107 mmol/L    CO2 25 20 - 31 mmol/L    Anion Gap 14 9 - 17 mmol/L    GFR Non-African American 51 (L) >60 mL/min    GFR African American >60 >60 mL/min    GFR Comment         Troponin   Result Value Ref Range    Troponin, High Sensitivity 8 0 - 14 ng/L   Troponin   Result Value Ref Range    Troponin, High Sensitivity 7 0 - 14 ng/L   Blood Gas, Venous   Result Value Ref Range    pH, Onel 7.320 7.320 - 7.420    pCO2, Onel 51.6 39 - 55    pO2, Onel 58.6 (H) 30 - 50    HCO3, Venous 25.8 24 - 30 mmol/L    Negative Base Excess, Onel 0.5 0.0 - 2.0 mmol/L    O2 Sat, Onel 90.3 (H) 60.0 - 85.0 %    Carboxyhemoglobin 2.7 0 - 5 %    Pt Temp 37.0     FIO2 UNKNOWN    D-Dimer, Quantitative   Result Value Ref Range    D-Dimer, Quant 0.64 mg/L FEU   Lipase   Result Value Ref Range    Lipase 52 13 - 60 U/L   Hepatic Function Panel   Result Value Ref Range    Albumin 4.1 3.5 - 5.2 g/dL    Alkaline Phosphatase 76 35 - 104 U/L    ALT 52 (H) 5 - 33 U/L    AST 58 (H) <32 U/L    Total Bilirubin 0.32 0.3 - 1.2 mg/dL    Bilirubin, Direct <0.08 <0.31 mg/dL    Bilirubin, Indirect Can not be calculated 0.00 - 1.00 mg/dL    Total Protein 7.6 6.4 - 8.3 g/dL    Albumin/Globulin Ratio 1.2 1.0 - 2.5   CBC   Result Value Ref Range    WBC 7.8 3.5 - 11.3 k/uL    RBC 4.77 3.95 - 5.11 m/uL    Hemoglobin 14.8 11.9 - 15.1 g/dL    Hematocrit 44.5 36.3 - 47.1 %    MCV 93.3 82.6 - 102.9 fL    MCH 31.0 25.2 - 33.5 pg    MCHC 33.3 28.4 - 34.8 g/dL    RDW 12.4 11.8 - 14.4 %    Platelets 627 840 - 137 k/uL    MPV 12.3 8.1 - 13.5 fL    NRBC Automated 0.0 0.0 per 100 WBC   Differential   Result Value Ref Range    Seg Neutrophils 59 36 - 65 %    Lymphocytes 28 24 - 43 %    Monocytes 9 3 - 12 %    Eosinophils % 2 1 - 4 %    Basophils 1 0 - 2 %    Immature Granulocytes 1 (H) 0 %    Segs Absolute 4.65 1.50 - 8.10 k/uL    Absolute Lymph # 2.20 1.10 - 3.70 k/uL    Absolute Mono # 0.66 0.10 - 1.20 k/uL    Absolute Eos # 0.17 0.00 - 0.44 k/uL    Basophils Absolute 0.08 0.00 - 0.20 k/uL    Absolute Immature Granulocyte 0.04 0.00 - 0.30 k/uL       IMPRESSION: 69-year-old female with history of COPD, presenting with COPD-like symptoms, worsening shortness of breath, inspiratory expiratory wheezing on lung exam, will administer breathing treatments, steroids, magnesium, start patient on oxygen. Will obtain EKG, troponins to evaluate for ACS. Will obtain basic labs to evaluate for anemia, electrolyte abnormality, respiratory status. Will obtain chest x-ray to evaluate for cardiomegaly, pulmonary edema, pneumonia. RADIOLOGY:  XR CHEST PORTABLE    Result Date: 4/25/2022  EXAMINATION: ONE XRAY VIEW OF THE CHEST 4/25/2022 1:47 pm COMPARISON: 02/01/2022 HISTORY: ORDERING SYSTEM PROVIDED HISTORY: dyspnea TECHNOLOGIST PROVIDED HISTORY: dyspnea FINDINGS: The lungs are without acute focal process. There is no effusion or pneumothorax. The cardiomediastinal silhouette is stable. The osseous structures are stable. No acute process. EKG  EKG Interpretation    Interpreted by me    Rhythm: normal sinus   Rate: normal  Axis: Left axis  Ectopy: none  Conduction: normal  ST Segments: no acute change  T Waves: no acute change  Q Waves: none    Clinical Impression: no acute changes and normal EKG, similar to prior EKG    All EKG's are interpreted by the Emergency Department Physician who either signs or Co-signs this chart in the absence of a cardiologist.    EMERGENCY DEPARTMENT COURSE:  Patient came to emergency department, HPI and physical exam were conducted. All nursing notes were reviewed. Patient has stable VBG. EKG found no acute changes, troponin stable, no concern for ACS, dimer negative, no concern for PE. On reevaluation, patient has improvement in inspiratory wheezes, continues to have expiratory wheezes, very mildly short of breath, still requiring oxygen, desats to 80s on removal from oxygen. Will admit patient to the Intermed service for further management of patient's COPD exacerbation.       PROCEDURES:      CONSULTS:  IP CONSULT TO HOSPITALIST    CRITICAL CARE:      FINAL IMPRESSION      1. COPD exacerbation ([facility] [address]          DISPOSITION / PLAN     DISPOSITION Admitted 04/25/2022 05:12:48 PM      PATIENT REFERRED TO:  No follow-up provider specified.     DISCHARGE MEDICATIONS:  New Prescriptions    No medications on file       Ricky Valiente MD  Emergency Medicine Resident    (Please note that portions of thisnote were completed with a voice recognition program.  Efforts were made to edit the dictations but occasionally words are mis-transcribed.)        Ricky Valiente MD  Resident  04/25/22 2038

## 2022-04-25 NOTE — ED PROVIDER NOTES
[address] [facility]     Emergency Department     Faculty Note/ Attestation      Pt Name: Daniel Molina                                       MRN: [id number]  Rozinagfceline 1950  Date of evaluation: 4/25/2022  Patients PCP:    Melvin Mejia MD    Attestation  I performed a history and physical examination of the patient/ or directly observed  and discussed management with the resident. I reviewed the residents note and agree with the documented findings and plan of care. Any areas of disagreement are noted on the chart. I was personally present for the key portions of any procedures. I have documented in the chart those procedures where I was not present during the key portions. I have reviewed the emergency nurses triage note. I agree with the chief complaint, past medical history, past surgical history, allergies, medications, social and family history as documented unless otherwise noted below. For Physician Assistant/ Nurse Practitioner cases/documentation I have personally evaluated this patient and have completed at least one if not all key elements of the E/M (history, physical exam, and MDM). Additional findings are as noted. This patient was evaluated in the Emergency Department for symptoms described in the history of present illness. The patient was evaluated in the context of the global COVID-19 pandemic, which necessitated consideration that the patient might be at risk for infection with the SARS-CoV-2 virus that causes COVID-19. Institutional protocols and algorithms that pertain to the evaluation of patients at risk for COVID-19 are in a state of rapid change based on information released by regulatory bodies including the CDC and federal and state organizations. These policies and algorithms were followed during the patient's care in the ED.      Initial Screens:        Kyle Coma Scale  Eye Opening: Spontaneous  Best Verbal Response: Oriented  Best Motor Response: Obeys commands  [place] Coma Scale Score: 15    Vitals:    Vitals:    04/25/22 1338   BP: (!) 142/110   Pulse: 80   Resp: 18   Temp: 98 °F (36.7 °C)   TempSrc: Oral   SpO2: 99%   Weight: 234 lb (106.1 kg)   Height: 5' 2\" (1.575 m)       Chief Complaint      Chief Complaint   Patient presents with    Shortness of Breath          height is 5' 2\" (1.575 m) and weight is 234 lb (106.1 kg). Her oral temperature is 98 °F (36.7 °C). Her blood pressure is 142/110 (abnormal) and her pulse is 80. Her respiration is 18 and oxygen saturation is 99%. DIAGNOSTIC RESULTS       RADIOLOGY:   XR CHEST PORTABLE   Final Result   No acute process.                LABS:  Labs Reviewed   D-DIMER, QUANTITATIVE   BASIC METABOLIC PANEL W/ REFLEX TO MG FOR LOW K   CBC WITH AUTO DIFFERENTIAL   TROPONIN   TROPONIN   BLOOD GAS, VENOUS   LIPASE   HEPATIC FUNCTION PANEL         EMERGENCY DEPARTMENT COURSE:     -------------------------      BP: (!) 142/110, Temp: 98 °F (36.7 °C), Pulse: 80, Resp: 18    System Problem List     Patient Active Problem List   Diagnosis    Chronic pain of left knee    Type 2 diabetes mellitus with diabetic polyneuropathy, with long-term current use of insulin (HCC)    COPD (chronic obstructive pulmonary disease)    Nonintractable migraine, unspecified migraine type    Coronary artery disease due to lipid rich plaque    DDD (degenerative disc disease), lumbar    Chronic, continuous use of opioids    DDD (degenerative disc disease), cervical    Osteoarthritis of cervical spine    Medication monitoring encounter    GERD (gastroesophageal reflux disease)    HTN (hypertension), benign    Mixed hyperlipidemia    Insomnia    Lumbosacral spondylosis without myelopathy    Sacroiliitis, not elsewhere classified (HCC)    Carpal tunnel syndrome    Mixed stress and urge urinary incontinence    Bilateral low back pain with sciatica    Intractable migraine with aura without status migrainosus    Spondylarthrosis    Anxiety and depression    Chronic migraine without aura without status migrainosus, not intractable    Morbid (severe) obesity with alveolar hypoventilation (HCC)    Lung nodule    Neuropathy    Obstructive sleep apnea syndrome    Asthma with COPD (HCC)    Obesity, Class III, BMI 40-49.9 (morbid obesity) (HCC)    Stage 3 chronic kidney disease (HCC)    Benign hypertension with CKD (chronic kidney disease) stage III (HCC)    Secondary diabetes mellitus with stage 3 chronic kidney disease (GFR 30-59) (HCC)    CKD (chronic kidney disease) stage 3, GFR 30-59 ml/min    Lumbar radiculopathy, chronic    Sessile colonic polyp    Morbid obesity (HCC)    Adenomatous polyp of ascending colon    Irritable bowel syndrome with both constipation and diarrhea    Abdominal bloating    Long term (current) use of insulin (HCC)    Major depressive disorder, single episode, unspecified    Permanent atrial fibrillation (HCC)    Polyneuropathy, unspecified    Other chronic pain    Opioid use, unspecified, uncomplicated    Atypical chest pain    Left ventricular diastolic dysfunction    Transaminasemia    Acute respiratory failure with hypoxia and hypercarbia (HCC)    Macrocytosis    Morbid obesity with BMI of 40.0-44.9, adult (HCC)         Comments  Chronic Prob List noted          Chance Amaya MD,, MD, F.A.C.E.P.   Attending Emergency Physician         Chance Amaya MD  04/25/22 2081

## 2022-04-25 NOTE — CARE COORDINATION
Case Management Initial Discharge Plan  Azalia Crowe,             Met with:patient to discuss discharge plans. Information verified: address, contacts, phone number, , insurance Yes  Insurance Provider: [facility]    Emergency Contact/Next of Kin name & number: Kaleb Wall (son) [phone]  Who are involved in patient's support system? son    PCP: Ro Jacobs MD  Date of last visit:       Discharge Planning    Living Arrangements:    alone    Home is 8th floor apartment with elevator entry    Patient able to perform ADL's:Independent    Current Services (outpatient & in home) none  DME equipment: electric wheelchair, walker, cpap, nebulizer, O2  DME provider: Abel Nieves    Is patient receiving oral anticoagulation therapy? No    If indicated:   Physician managing anticoagulation treatment:   Where does patient obtain lab work for ATC treatment? Does patient have any issues/concerns obtaining medications? No  If yes, what are patient's concerns? Is there a preferred Pharmacy after hours or on weekends? Yes    If yes, which pharmacy? [facility]    Potential Assistance Needed:       Patient agreeable to home care: No  [place] of choice provided:  no    Prior SNF/Rehab Placement and Facility: [facility] Rank to SNF/Rehab: No  [place] of choice provided: no     Evaluation: no    Expected Discharge date:       Patient expects to be discharged to: If home: is the family and/or caregiver wiling & able to provide support at home? Who will be providing this support? Follow Up Appointment: Best Day/ Time:      Transportation provider: medical cab  Transportation arrangements needed for discharge: Yes    Readmission Risk              Risk of Unplanned Readmission:  0             Does patient have a readmission risk score greater than 14?:   If yes, follow-up appointment must be made within 7 days of discharge.      Goals of Care:       Educated patient on transitional options, provided freedom of choice and are agreeable with plan      Discharge Plan: home independently, needs cab          Electronically signed by Blanca Brandt RN on 4/25/22 at 4:59 PM EDT

## 2022-04-25 NOTE — ED NOTES
Patient tearful about being admitted  Patient given food (ok per Dr. Toy Ruiz)  Patient given phone  Patient's son requesting to talk with doctor     Ac Mcnulty RN  04/25/22 4685

## 2022-04-26 LAB
EKG ATRIAL RATE: 75 BPM
EKG P AXIS: 57 DEGREES
EKG P-R INTERVAL: 128 MS
EKG Q-T INTERVAL: 350 MS
EKG QRS DURATION: 68 MS
EKG QTC CALCULATION (BAZETT): 390 MS
EKG R AXIS: -30 DEGREES
EKG T AXIS: 88 DEGREES
EKG VENTRICULAR RATE: 75 BPM
GLUCOSE BLD-MCNC: 263 MG/DL (ref 65–105)
GLUCOSE BLD-MCNC: 290 MG/DL (ref 65–105)
GLUCOSE BLD-MCNC: 353 MG/DL (ref 65–105)
GLUCOSE BLD-MCNC: 416 MG/DL (ref 65–105)
GLUCOSE BLD-MCNC: 427 MG/DL (ref 65–105)
GLUCOSE BLD-MCNC: 487 MG/DL (ref 65–105)

## 2022-04-26 PROCEDURE — 99233 SBSQ HOSP IP/OBS HIGH 50: CPT | Performed by: INTERNAL MEDICINE

## 2022-04-26 PROCEDURE — 1200000000 HC SEMI PRIVATE

## 2022-04-26 PROCEDURE — 6360000002 HC RX W HCPCS: Performed by: INTERNAL MEDICINE

## 2022-04-26 PROCEDURE — 94761 N-INVAS EAR/PLS OXIMETRY MLT: CPT

## 2022-04-26 PROCEDURE — 94640 AIRWAY INHALATION TREATMENT: CPT

## 2022-04-26 PROCEDURE — 2580000003 HC RX 258: Performed by: NURSE PRACTITIONER

## 2022-04-26 PROCEDURE — 6370000000 HC RX 637 (ALT 250 FOR IP): Performed by: NURSE PRACTITIONER

## 2022-04-26 PROCEDURE — 6370000000 HC RX 637 (ALT 250 FOR IP): Performed by: INTERNAL MEDICINE

## 2022-04-26 PROCEDURE — 94660 CPAP INITIATION&MGMT: CPT

## 2022-04-26 PROCEDURE — 2500000003 HC RX 250 WO HCPCS: Performed by: INTERNAL MEDICINE

## 2022-04-26 PROCEDURE — 2700000000 HC OXYGEN THERAPY PER DAY

## 2022-04-26 PROCEDURE — 82947 ASSAY GLUCOSE BLOOD QUANT: CPT

## 2022-04-26 RX ORDER — INSULIN LISPRO 100 [IU]/ML
0-9 INJECTION, SOLUTION INTRAVENOUS; SUBCUTANEOUS NIGHTLY
Status: DISCONTINUED | OUTPATIENT
Start: 2022-04-26 | End: 2022-05-01 | Stop reason: HOSPADM

## 2022-04-26 RX ORDER — CYCLOBENZAPRINE HCL 10 MG
10 TABLET ORAL 3 TIMES DAILY PRN
Status: DISCONTINUED | OUTPATIENT
Start: 2022-04-26 | End: 2022-05-01 | Stop reason: HOSPADM

## 2022-04-26 RX ORDER — INSULIN LISPRO 100 [IU]/ML
15 INJECTION, SOLUTION INTRAVENOUS; SUBCUTANEOUS ONCE
Status: COMPLETED | OUTPATIENT
Start: 2022-04-26 | End: 2022-04-26

## 2022-04-26 RX ORDER — INSULIN GLARGINE 100 [IU]/ML
60 INJECTION, SOLUTION SUBCUTANEOUS 2 TIMES DAILY
Status: DISCONTINUED | OUTPATIENT
Start: 2022-04-26 | End: 2022-04-28

## 2022-04-26 RX ORDER — OXYCODONE HYDROCHLORIDE AND ACETAMINOPHEN 5; 325 MG/1; MG/1
1 TABLET ORAL EVERY 4 HOURS PRN
Status: DISCONTINUED | OUTPATIENT
Start: 2022-04-26 | End: 2022-04-28

## 2022-04-26 RX ORDER — INSULIN LISPRO 100 [IU]/ML
0-18 INJECTION, SOLUTION INTRAVENOUS; SUBCUTANEOUS
Status: DISCONTINUED | OUTPATIENT
Start: 2022-04-26 | End: 2022-05-01 | Stop reason: HOSPADM

## 2022-04-26 RX ORDER — METHYLPREDNISOLONE SODIUM SUCCINATE 125 MG/2ML
40 INJECTION, POWDER, LYOPHILIZED, FOR SOLUTION INTRAMUSCULAR; INTRAVENOUS EVERY 6 HOURS
Status: DISCONTINUED | OUTPATIENT
Start: 2022-04-26 | End: 2022-04-27

## 2022-04-26 RX ORDER — KETOROLAC TROMETHAMINE 15 MG/ML
15 INJECTION, SOLUTION INTRAMUSCULAR; INTRAVENOUS ONCE
Status: COMPLETED | OUTPATIENT
Start: 2022-04-26 | End: 2022-04-26

## 2022-04-26 RX ADMIN — CYCLOBENZAPRINE 10 MG: 10 TABLET, FILM COATED ORAL at 11:12

## 2022-04-26 RX ADMIN — ASPIRIN 81 MG: 81 TABLET, COATED ORAL at 10:03

## 2022-04-26 RX ADMIN — Medication 10 MG: at 10:01

## 2022-04-26 RX ADMIN — INSULIN GLARGINE 55 UNITS: 100 INJECTION, SOLUTION SUBCUTANEOUS at 00:13

## 2022-04-26 RX ADMIN — LOSARTAN POTASSIUM 25 MG: 25 TABLET, FILM COATED ORAL at 10:01

## 2022-04-26 RX ADMIN — METHYLPREDNISOLONE SODIUM SUCCINATE 40 MG: 125 INJECTION, POWDER, FOR SOLUTION INTRAMUSCULAR; INTRAVENOUS at 21:14

## 2022-04-26 RX ADMIN — FERROUS SULFATE TAB EC 325 MG (65 MG FE EQUIVALENT) 325 MG: 325 (65 FE) TABLET DELAYED RESPONSE at 10:08

## 2022-04-26 RX ADMIN — PANTOPRAZOLE SODIUM 40 MG: 40 TABLET, DELAYED RELEASE ORAL at 21:15

## 2022-04-26 RX ADMIN — CITALOPRAM 20 MG: 20 TABLET, FILM COATED ORAL at 10:01

## 2022-04-26 RX ADMIN — OMEGA-3-ACID ETHYL ESTERS 1 G: 1 CAPSULE, LIQUID FILLED ORAL at 09:59

## 2022-04-26 RX ADMIN — Medication 1 TABLET: at 10:01

## 2022-04-26 RX ADMIN — CARVEDILOL 3.12 MG: 3.12 TABLET, FILM COATED ORAL at 10:01

## 2022-04-26 RX ADMIN — INSULIN LISPRO 6 UNITS: 100 INJECTION, SOLUTION INTRAVENOUS; SUBCUTANEOUS at 00:16

## 2022-04-26 RX ADMIN — TOPIRAMATE 100 MG: 100 TABLET, FILM COATED ORAL at 00:47

## 2022-04-26 RX ADMIN — Medication 10 MG: at 23:06

## 2022-04-26 RX ADMIN — SODIUM CHLORIDE, PRESERVATIVE FREE 10 ML: 5 INJECTION INTRAVENOUS at 01:47

## 2022-04-26 RX ADMIN — PANTOPRAZOLE SODIUM 40 MG: 40 TABLET, DELAYED RELEASE ORAL at 00:47

## 2022-04-26 RX ADMIN — MAGNESIUM GLUCONATE 500 MG ORAL TABLET 400 MG: 500 TABLET ORAL at 09:59

## 2022-04-26 RX ADMIN — CLOPIDOGREL 75 MG: 75 TABLET, FILM COATED ORAL at 10:08

## 2022-04-26 RX ADMIN — INSULIN LISPRO 15 UNITS: 100 INJECTION, SOLUTION INTRAVENOUS; SUBCUTANEOUS at 18:52

## 2022-04-26 RX ADMIN — FENOFIBRATE 160 MG: 160 TABLET ORAL at 10:03

## 2022-04-26 RX ADMIN — SODIUM CHLORIDE, PRESERVATIVE FREE 10 ML: 5 INJECTION INTRAVENOUS at 21:15

## 2022-04-26 RX ADMIN — INSULIN GLARGINE 60 UNITS: 100 INJECTION, SOLUTION SUBCUTANEOUS at 22:06

## 2022-04-26 RX ADMIN — OXYBUTYNIN CHLORIDE 5 MG: 5 TABLET, EXTENDED RELEASE ORAL at 10:02

## 2022-04-26 RX ADMIN — AZITHROMYCIN 500 MG: 250 TABLET, FILM COATED ORAL at 10:30

## 2022-04-26 RX ADMIN — INSULIN LISPRO 9 UNITS: 100 INJECTION, SOLUTION INTRAVENOUS; SUBCUTANEOUS at 22:06

## 2022-04-26 RX ADMIN — CEFTRIAXONE SODIUM 1000 MG: 1 INJECTION, POWDER, FOR SOLUTION INTRAMUSCULAR; INTRAVENOUS at 11:13

## 2022-04-26 RX ADMIN — INSULIN LISPRO 9 UNITS: 100 INJECTION, SOLUTION INTRAVENOUS; SUBCUTANEOUS at 15:39

## 2022-04-26 RX ADMIN — OXYCODONE HYDROCHLORIDE AND ACETAMINOPHEN 1 TABLET: 5; 325 TABLET ORAL at 21:15

## 2022-04-26 RX ADMIN — PANTOPRAZOLE SODIUM 40 MG: 40 TABLET, DELAYED RELEASE ORAL at 10:30

## 2022-04-26 RX ADMIN — KETOROLAC TROMETHAMINE 15 MG: 15 INJECTION, SOLUTION INTRAMUSCULAR; INTRAVENOUS at 11:13

## 2022-04-26 RX ADMIN — BUDESONIDE AND FORMOTEROL FUMARATE DIHYDRATE 2 PUFF: 160; 4.5 AEROSOL RESPIRATORY (INHALATION) at 08:30

## 2022-04-26 RX ADMIN — Medication 10 MG: at 00:47

## 2022-04-26 RX ADMIN — INSULIN LISPRO 12 UNITS: 100 INJECTION, SOLUTION INTRAVENOUS; SUBCUTANEOUS at 08:07

## 2022-04-26 RX ADMIN — METHYLPREDNISOLONE SODIUM SUCCINATE 40 MG: 125 INJECTION, POWDER, FOR SOLUTION INTRAMUSCULAR; INTRAVENOUS at 10:30

## 2022-04-26 RX ADMIN — INSULIN LISPRO 15 UNITS: 100 INJECTION, SOLUTION INTRAVENOUS; SUBCUTANEOUS at 11:13

## 2022-04-26 RX ADMIN — METHYLPREDNISOLONE SODIUM SUCCINATE 40 MG: 125 INJECTION, POWDER, FOR SOLUTION INTRAMUSCULAR; INTRAVENOUS at 15:39

## 2022-04-26 RX ADMIN — ISOSORBIDE DINITRATE 30 MG: 10 TABLET ORAL at 10:01

## 2022-04-26 RX ADMIN — VITAM B12 50 MCG: 100 TAB at 10:09

## 2022-04-26 RX ADMIN — TOPIRAMATE 100 MG: 100 TABLET, FILM COATED ORAL at 23:06

## 2022-04-26 RX ADMIN — ATORVASTATIN CALCIUM 40 MG: 80 TABLET, FILM COATED ORAL at 10:08

## 2022-04-26 ASSESSMENT — PAIN SCALES - GENERAL
PAINLEVEL_OUTOF10: 7
PAINLEVEL_OUTOF10: 8

## 2022-04-26 NOTE — PROGRESS NOTES
[facility]  Office: [address], DO, Patricia Baumann, DO, Amy Martines, DO, Anitha Mario Blood, DO, Teodora Alarcon MD, Zaki Deleon MD, Lisa Boogie MD, Yu Hanks MD, Tiarra Shahid MD, Avelino Clement MD, Carla Ford MD, Sujatha Maldonado, DO, Terrell Holliday, DO, Juanita Dotson MD,  Evelyne Chang, DO, Neri Antonio MD, Eitan Meier MD, Tristin Fuentes MD, Navya Mccauley, DO, Harper Walker MD, Abigail Santillan MD, Mable Esteban, [facility], [facility], CNP, Aliyah Medley, CNP, Alanna Ocasio, CNP, Ruben Sellers, CNP, Gamaliel Fu, CNP, Loc Jenkins PA-C, Curtis Aldrich, DNP, Ambrocio Charles, CNP, Dimitri Alegria, CNP, Ambar Adair, CNP, Sandi Richmond, CNS, Eli Goss, DNP, Craig Jacinto, CNP, Jamari Briceno, CNP, Rona Almanzar, CNP         [address]    Progress Note    4/26/2022    4:39 PM    Name:   Jourdan Lezama  MRN:     [id number]     Acct:      [de-identified]   Room:   04/04   Day:  1  Admit Date:  4/25/2022  1:46 PM    PCP:   Ronaldo Ocasio MD  Code Status:  Full Code    Subjective:     C/C:   Chief Complaint   Patient presents with    Shortness of Breath     Interval History Status: not changed. Patient seen examined this morning. Admitted overnight secondary to left-sided chest discomfort. Found to be in acute suspicion of COPD. She reports that her shortness of breath has improved, as her wheezing has diminished significantly. Still having intermittent left-sided chest pain. She reports that the chest pain did subside overnight. Troponins were negative. Reports that onset of her symptoms was approximately 3 days ago, and she waited to come to the hospital for just a little too long. Hyperglycemia is slowly improving throughout the day today. Brief History:     Ms. Tharon Scheuermann is a 66year old female who presented for evaluation of increased SOB over the last several days.  She states she does have home oxygen for prn usage that she has been using continuously. She states she has has associated cough and chest pain that is worse when laying down. She denies any fever or chills. She does have a medical history that includes COPD, DM, HTN and CAD. She also complains of GERD and states she has been out of her home Prilosec. She states she has had a falling out with her PCP and is scheduled to see a new provider in early May. She is compliant with home medications. She is a former smoker (quit 2000), denies use of alcohol or recreational drugs. Review of Systems:     Constitutional:  negative for chills, fevers, sweats  Respiratory: Reports improvement of shortness of breath; negative for cough  Cardiovascular: Reports left-sided chest wall pain. Negative for lower extremity edema, palpitations  Gastrointestinal:  negative for abdominal pain, constipation, diarrhea, nausea, vomiting  Neurological:  negative for dizziness, headache    Medications: Allergies: Allergies   Allergen Reactions    Robitussin [Guaifenesin] Anaphylaxis    Codeine Swelling    Compazine [Prochlorperazine Maleate] Hives and Swelling    Iodides Itching    Morphine Other (See Comments)     hallucinations    Moxifloxacin      Other reaction(s): Intolerance-unknown  Other reaction(s):  Intolerance-unknown    Reglan [Metoclopramide] Nausea Only    Avelox [Moxifloxacin Hcl In Nacl] Rash     Dermatitis      Cipro Xr Rash     dermatitis    Sulfa Antibiotics Rash       Current Meds:   Scheduled Meds:    cefTRIAXone (ROCEPHIN) IV  1,000 mg IntraVENous Q24H    methylPREDNISolone  40 mg IntraVENous Q6H    insulin lispro  0-18 Units SubCUTAneous TID WC    insulin lispro  0-9 Units SubCUTAneous Nightly    insulin glargine  60 Units SubCUTAneous BID    aspirin  81 mg Oral Daily    atorvastatin  40 mg Oral Daily    carvedilol  3.125 mg Oral BID WC    citalopram  20 mg Oral Daily    clopidogrel  75 mg Oral Daily    fenofibrate  160 mg Oral Daily    ferrous sulfate  325 mg Oral Daily with breakfast    omega-3 acid ethyl esters  1 g Oral Daily    isosorbide dinitrate  30 mg Oral Daily    losartan  25 mg Oral Daily    magnesium oxide  400 mg Oral Daily    therapeutic multivitamin-minerals  1 tablet Oral Daily    oxybutynin  5 mg Oral Daily    pantoprazole  40 mg Oral BID    budesonide-formoterol  2 puff Inhalation Daily    topiramate  100 mg Oral Nightly    vitamin B-12  50 mcg Oral Daily    sodium chloride flush  5-40 mL IntraVENous 2 times per day    azithromycin  500 mg Oral Daily     Continuous Infusions:    dextrose      sodium chloride       PRN Meds: oxyCODONE-acetaminophen, cyclobenzaprine, albuterol, ipratropium-albuterol, docusate sodium, ibuprofen, melatonin, glucose, dextrose, glucagon (rDNA), dextrose, sodium chloride flush, sodium chloride, ondansetron **OR** ondansetron    Data:     Past Medical History:   has a past medical history of Abdominal bloating, Adenomatous polyp of ascending colon, Allergic rhinitis, Anxiety, Arthritis, Asthma, Atrial fibrillation ([facility]), Back pain, Benign hypertension with CKD (chronic kidney disease) stage III ([facility]), CAD (coronary artery disease), Caffeine use, CHF (congestive heart failure) ([facility]), Chronic kidney disease, CKD (chronic kidney disease) stage 3, GFR 30-59 ml/min ([facility]), COPD (chronic obstructive pulmonary disease) ([facility]), Degeneration of lumbar or lumbosacral intervertebral disc, Depression, DM (diabetes mellitus) ([facility] Emphysema of lung ([facility] Gastritis, GERD (gastroesophageal reflux disease), Hearing loss, Hematuria, Hiatal hernia, HTN (hypertension), benign, Hypertriglyceridemia, Incontinence of urine, Irritable bowel syndrome with both constipation and diarrhea, Knee arthropathy, Lumbosacral spondylosis without myelopathy, Migraine headache, Mumps, Neuropathy, Obesity, On home oxygen therapy, HIGINIO on CPAP, Otitis media, Secondary diabetes mellitus with stage 3 chronic kidney disease (GFR 30-59) ([facility] Stroke ([facility] Tinnitus, Type 2 diabetes mellitus without complication ([facility] Type II or unspecified type diabetes mellitus without mention of complication, not stated as uncontrolled, UTI (lower urinary tract infection), and Varicose vein. Social History:   reports that she quit smoking about 22 years ago. Her smoking use included cigarettes. She has a 123.00 pack-year smoking history. She has never used smokeless tobacco. She reports that she does not drink alcohol and does not use drugs. Family History:   Family History   Problem Relation Age of Onset    Diabetes Mother     Heart Disease Mother     Stomach Cancer Father     Diabetes Maternal Grandmother         also aunts and uncles (maternal)    Emphysema Sister        Vitals:  /63   Pulse 80   Temp 98 °F (36.7 °C) (Oral)   Resp 24   Ht 5' 2\" (1.575 m)   Wt 234 lb (106.1 kg)   LMP  (LMP Unknown)   SpO2 96%   BMI 42.80 kg/m²   No data recorded. Recent Labs     04/26/22  0145 04/26/22  0804 04/26/22  1258 04/26/22  1531   POCGLU 487* 416* 263* 290*       I/O (24Hr):   No intake or output data in the 24 hours ending 04/26/22 1639    Labs:  Hematology:  Recent Labs     04/25/22  1356   WBC 7.8   RBC 4.77   HGB 14.8   HCT 44.5   MCV 93.3   MCH 31.0   MCHC 33.3   RDW 12.4      MPV 12.3   DDIMER 0.64     Chemistry:  Recent Labs     04/25/22  1415 04/25/22  1915   *  --    K 3.8  --    CL 95*  --    CO2 25  --    GLUCOSE 300*  --    BUN 13  --    CREATININE 1.06*  --    ANIONGAP 14  --    LABGLOM 51*  --    GFRAA >60  --    CALCIUM 9.2  --    TROPHS 8 7     Recent Labs     04/25/22  1356 04/25/22  2107 04/25/22  2351 04/26/22  0145 04/26/22  0804 04/26/22  1258 04/26/22  1531   PROT 7.6  --   --   --   --   --   --    LABALBU 4.1  --   --   --   --   --   --    AST 58*  --   --   --   --   --   --    ALT 52*  --   --   --   --   --   --    ALKPHOS 76  --   --   --   --   --   -- BILITOT 0.32  --   --   --   --   --   --    BILIDIR <0.08  --   --   --   --   --   --    LIPASE 52  --   --   --   --   --   --    POCGLU  --  357* 501* 487* 416* 263* 290*     ABG:  Lab Results   Component Value Date    POCPH 7.239 10/06/2021    PH 7.346 11/21/2011    POCPCO2 77.6 10/06/2021    PCO2 49.8 11/21/2011    POCPO2 137.2 10/06/2021    PO2ART 61.3 11/21/2011    POCHCO3 33.1 10/06/2021    KDX2VHI 26.5 11/21/2011    NBEA NOT REPORTED 10/06/2021    PBEA 3 10/06/2021    SBH7FGA NOT REPORTED 10/06/2021    RHLA9RFS 98 10/06/2021    N3FNZNAS 89.7 11/21/2011    FIO2 UNKNOWN 04/25/2022     Lab Results   Component Value Date/Time    SPECIAL NOT REPORTED 12/31/2021 12:36 PM     Lab Results   Component Value Date/Time    CULTURE ENTEROCOCCUS FAECALIS 10 to 50,000 CFU/ML (A) 12/31/2021 12:36 PM    CULTURE PRESUMPTIVE CANDIDA ALBICANS 10 to 50,000 CFU/ML (A) 12/31/2021 12:36 PM       Radiology:  XR CHEST PORTABLE    Result Date: 4/25/2022  No acute process.        Physical Examination:        General appearance:  alert, cooperative and no distress, morbidly obese  female  Mental Status:  oriented to person, place and time and normal affect  HEENT: Supple cannula present, EOMI, sclera anti-icteric, large neck circumference  Lungs:  clear to auscultation bilaterally, and expiratory wheezing heard globally, no rhonchi, normal effort  Heart:  regular rate and rhythm, no murmur  Abdomen:  soft, nontender, nondistended, normal bowel sounds, protuberant  Extremities:  no edema, redness, tenderness in the calves  Skin:  no gross lesions, rashes, induration    Assessment:        Hospital Problems           Last Modified POA    * (Principal) COPD exacerbation ([facility] 4/25/2022 Yes    Type 2 diabetes mellitus with diabetic polyneuropathy, with long-term current use of insulin (HCC) (Chronic) 4/25/2022 Yes    Asthma with COPD ([facility] 4/26/2022 Yes    GERD (gastroesophageal reflux disease) (Chronic) 4/26/2022 Yes    Primary hypertension 4/26/2022 Yes    Mixed hyperlipidemia (Chronic) 4/26/2022 Yes    Overview Signed 9/7/2015  4:50 AM by [facility]     replace inactive diagnosis         Obesity, Class III, BMI 40-49.9 (morbid obesity) ([facility] 4/26/2022 Yes    Stage 3 chronic kidney disease ([facility] 4/26/2022 Yes    Benign hypertension with CKD (chronic kidney disease) stage III ([facility] 4/26/2022 Yes          Plan:        Acute exacerbation of COPD- stop prednisone. Start Solu-Medrol 40 mg every 6 hours. Will be able to wean tomorrow as wheezing has significantly improved. Rocephin/azithromycin for antibiotic therapy. Continue updrafts. Albuterol as needed. Check respiratory culture. Noncardiac chest pain- Toradol x1. Flexeril as needed. Type 2 diabetes mellitus with hyperglycemia-increase Lantus to 60 units twice daily. Given an extra 50 units of regular insulin today. Continue high-dose insulin sliding scale.   Transaminitis- trend  Morbid obesity with BMI of 42.80- diet/weight loss/exercise recommended  Continue all other home medications    33 Agnes Stoddard DO  4/26/2022  4:39 PM

## 2022-04-26 NOTE — ED NOTES
Pharmacy called about continued no insulin in ER  Requesting that pharmacy please send lantus and Lispro to ER ASAP  Patient nausea improved, patient eating turkey sandwich     Monia Apley, RN  04/26/22 0005

## 2022-04-26 NOTE — ED NOTES
Report given to [facility]  No change in patient status  Remains on Cpap  Continues to rest quietly       Larwance Mcburney, [facility]  04/26/22 9437

## 2022-04-26 NOTE — ACP (ADVANCE CARE PLANNING)
..Advance Care Planning     Advance Care Planning Activator (Inpatient)  Conversation Note      Date of ACP Conversation: 4/25/2022     Conversation Conducted with: Patient with Decision Making Capacity    ACP Activator: Jens Gonzalez, [address] :     Current Designated Health Care Decision Maker:   Yesenia Ceron Son : [phone]  Click here to complete Healthcare Decision Makers including section of the Healthcare Decision Maker Relationship (ie \"Primary\")  Today we documented Decision Maker(s) consistent with Legal Next of Kin hierarchy. Care Preferences    Ventilation: \"If you were in your present state of health and suddenly became very ill and were unable to breathe on your own, what would your preference be about the use of a ventilator (breathing machine) if it were available to you? \"      Would the patient desire the use of ventilator (breathing machine)?: yes    \"If your health worsens and it becomes clear that your chance of recovery is unlikely, what would your preference be about the use of a ventilator (breathing machine) if it were available to you? \"     Would the patient desire the use of ventilator (breathing machine)?: Yes      Resuscitation  \"CPR works best to restart the heart when there is a sudden event, like a heart attack, in someone who is otherwise healthy. Unfortunately, CPR does not typically restart the heart for people who have serious health conditions or who are very sick. \"    \"In the event your heart stopped as a result of an underlying serious health condition, would you want attempts to be made to restart your heart (answer \"yes\" for attempt to resuscitate) or would you prefer a natural death (answer \"no\" for do not attempt to resuscitate)? \" yes       [] Yes   [] No   Educated Patient / Florene Billing regarding differences between Advance Directives and portable DNR orders.     Length of ACP Conversation in minutes:      Conversation Outcomes:  [x] ACP discussion completed  [] Existing advance directive reviewed with patient; no changes to patient's previously recorded wishes  [] New Advance Directive completed  [] Portable Do Not Rescitate prepared for Provider review and signature  [] POLST/POST/MOLST/MOST prepared for Provider review and signature      Follow-up plan:    [] Schedule follow-up conversation to continue planning  [] Referred individual to Provider for additional questions/concerns   [] Advised patient/agent/surrogate to review completed ACP document and update if needed with changes in condition, patient preferences or care setting    [] This note routed to one or more involved healthcare providers

## 2022-04-27 PROBLEM — D72.828 NEUTROPHILIC LEUKOCYTOSIS: Status: ACTIVE | Noted: 2022-04-27

## 2022-04-27 PROBLEM — D72.9 NEUTROPHILIC LEUKOCYTOSIS: Status: ACTIVE | Noted: 2022-04-27

## 2022-04-27 LAB
ABSOLUTE EOS #: <0.03 K/UL (ref 0–0.44)
ABSOLUTE IMMATURE GRANULOCYTE: 0.15 K/UL (ref 0–0.3)
ABSOLUTE LYMPH #: 0.98 K/UL (ref 1.1–3.7)
ABSOLUTE MONO #: 0.43 K/UL (ref 0.1–1.2)
ANION GAP SERPL CALCULATED.3IONS-SCNC: 11 MMOL/L (ref 9–17)
ANION GAP SERPL CALCULATED.3IONS-SCNC: 8 MMOL/L (ref 9–17)
BASOPHILS # BLD: 0 % (ref 0–2)
BASOPHILS ABSOLUTE: <0.03 K/UL (ref 0–0.2)
BUN BLDV-MCNC: 42 MG/DL (ref 8–23)
BUN BLDV-MCNC: 52 MG/DL (ref 8–23)
CALCIUM SERPL-MCNC: 8.7 MG/DL (ref 8.6–10.4)
CALCIUM SERPL-MCNC: 9.2 MG/DL (ref 8.6–10.4)
CHLORIDE BLD-SCNC: 96 MMOL/L (ref 98–107)
CHLORIDE BLD-SCNC: 98 MMOL/L (ref 98–107)
CO2: 26 MMOL/L (ref 20–31)
CO2: 26 MMOL/L (ref 20–31)
CREAT SERPL-MCNC: 1.51 MG/DL (ref 0.5–0.9)
CREAT SERPL-MCNC: 1.72 MG/DL (ref 0.5–0.9)
EOSINOPHILS RELATIVE PERCENT: 0 % (ref 1–4)
GFR AFRICAN AMERICAN: 35 ML/MIN
GFR AFRICAN AMERICAN: 41 ML/MIN
GFR NON-AFRICAN AMERICAN: 29 ML/MIN
GFR NON-AFRICAN AMERICAN: 34 ML/MIN
GFR SERPL CREATININE-BSD FRML MDRD: ABNORMAL ML/MIN/{1.73_M2}
GFR SERPL CREATININE-BSD FRML MDRD: ABNORMAL ML/MIN/{1.73_M2}
GLUCOSE BLD-MCNC: 106 MG/DL (ref 65–105)
GLUCOSE BLD-MCNC: 134 MG/DL (ref 65–105)
GLUCOSE BLD-MCNC: 190 MG/DL (ref 70–99)
GLUCOSE BLD-MCNC: 191 MG/DL (ref 65–105)
GLUCOSE BLD-MCNC: 239 MG/DL (ref 65–105)
GLUCOSE BLD-MCNC: 286 MG/DL (ref 65–105)
GLUCOSE BLD-MCNC: 287 MG/DL (ref 65–105)
GLUCOSE BLD-MCNC: 297 MG/DL (ref 65–105)
GLUCOSE BLD-MCNC: 344 MG/DL (ref 65–105)
GLUCOSE BLD-MCNC: 348 MG/DL (ref 65–105)
GLUCOSE BLD-MCNC: 358 MG/DL (ref 70–99)
GLUCOSE BLD-MCNC: 373 MG/DL (ref 65–105)
GLUCOSE BLD-MCNC: 426 MG/DL (ref 65–105)
GLUCOSE BLD-MCNC: 449 MG/DL (ref 65–105)
GLUCOSE BLD-MCNC: 488 MG/DL (ref 65–105)
HCT VFR BLD CALC: 38.1 % (ref 36.3–47.1)
HEMOGLOBIN: 12.6 G/DL (ref 11.9–15.1)
IMMATURE GRANULOCYTES: 1 %
LYMPHOCYTES # BLD: 6 % (ref 24–43)
MCH RBC QN AUTO: 30.8 PG (ref 25.2–33.5)
MCHC RBC AUTO-ENTMCNC: 33.1 G/DL (ref 28.4–34.8)
MCV RBC AUTO: 93.2 FL (ref 82.6–102.9)
MONOCYTES # BLD: 3 % (ref 3–12)
NRBC AUTOMATED: 0 PER 100 WBC
PDW BLD-RTO: 12.5 % (ref 11.8–14.4)
PLATELET # BLD: 200 K/UL (ref 138–453)
PMV BLD AUTO: 11.7 FL (ref 8.1–13.5)
POTASSIUM SERPL-SCNC: 4.8 MMOL/L (ref 3.7–5.3)
POTASSIUM SERPL-SCNC: 4.9 MMOL/L (ref 3.7–5.3)
RBC # BLD: 4.09 M/UL (ref 3.95–5.11)
SEG NEUTROPHILS: 91 % (ref 36–65)
SEGMENTED NEUTROPHILS ABSOLUTE COUNT: 15.09 K/UL (ref 1.5–8.1)
SODIUM BLD-SCNC: 132 MMOL/L (ref 135–144)
SODIUM BLD-SCNC: 133 MMOL/L (ref 135–144)
WBC # BLD: 16.7 K/UL (ref 3.5–11.3)

## 2022-04-27 PROCEDURE — 6370000000 HC RX 637 (ALT 250 FOR IP): Performed by: INTERNAL MEDICINE

## 2022-04-27 PROCEDURE — 85025 COMPLETE CBC W/AUTO DIFF WBC: CPT

## 2022-04-27 PROCEDURE — 2500000003 HC RX 250 WO HCPCS: Performed by: INTERNAL MEDICINE

## 2022-04-27 PROCEDURE — 6370000000 HC RX 637 (ALT 250 FOR IP): Performed by: NURSE PRACTITIONER

## 2022-04-27 PROCEDURE — 94660 CPAP INITIATION&MGMT: CPT

## 2022-04-27 PROCEDURE — 82947 ASSAY GLUCOSE BLOOD QUANT: CPT

## 2022-04-27 PROCEDURE — 2060000000 HC ICU INTERMEDIATE R&B

## 2022-04-27 PROCEDURE — 80048 BASIC METABOLIC PNL TOTAL CA: CPT

## 2022-04-27 PROCEDURE — 2580000003 HC RX 258: Performed by: NURSE PRACTITIONER

## 2022-04-27 PROCEDURE — 6360000002 HC RX W HCPCS: Performed by: INTERNAL MEDICINE

## 2022-04-27 PROCEDURE — 2580000003 HC RX 258: Performed by: INTERNAL MEDICINE

## 2022-04-27 PROCEDURE — 99233 SBSQ HOSP IP/OBS HIGH 50: CPT | Performed by: INTERNAL MEDICINE

## 2022-04-27 PROCEDURE — 36415 COLL VENOUS BLD VENIPUNCTURE: CPT

## 2022-04-27 RX ORDER — PREDNISONE 20 MG/1
40 TABLET ORAL DAILY
Status: DISCONTINUED | OUTPATIENT
Start: 2022-04-28 | End: 2022-05-01 | Stop reason: HOSPADM

## 2022-04-27 RX ORDER — METHYLPREDNISOLONE SODIUM SUCCINATE 40 MG/ML
30 INJECTION, POWDER, LYOPHILIZED, FOR SOLUTION INTRAMUSCULAR; INTRAVENOUS EVERY 12 HOURS
Status: DISCONTINUED | OUTPATIENT
Start: 2022-04-27 | End: 2022-04-27

## 2022-04-27 RX ORDER — INSULIN GLARGINE 100 [IU]/ML
20 INJECTION, SOLUTION SUBCUTANEOUS ONCE
Status: COMPLETED | OUTPATIENT
Start: 2022-04-27 | End: 2022-04-27

## 2022-04-27 RX ORDER — CALCIUM CARBONATE 200(500)MG
1000 TABLET,CHEWABLE ORAL 3 TIMES DAILY PRN
Status: DISCONTINUED | OUTPATIENT
Start: 2022-04-27 | End: 2022-05-01 | Stop reason: HOSPADM

## 2022-04-27 RX ORDER — INSULIN LISPRO 100 [IU]/ML
15 INJECTION, SOLUTION INTRAVENOUS; SUBCUTANEOUS ONCE
Status: COMPLETED | OUTPATIENT
Start: 2022-04-27 | End: 2022-04-27

## 2022-04-27 RX ORDER — MIDODRINE HYDROCHLORIDE 5 MG/1
5 TABLET ORAL ONCE
Status: COMPLETED | OUTPATIENT
Start: 2022-04-28 | End: 2022-04-28

## 2022-04-27 RX ORDER — SODIUM CHLORIDE 450 MG/100ML
INJECTION, SOLUTION INTRAVENOUS CONTINUOUS
Status: DISCONTINUED | OUTPATIENT
Start: 2022-04-27 | End: 2022-05-01 | Stop reason: HOSPADM

## 2022-04-27 RX ADMIN — CARVEDILOL 3.12 MG: 3.12 TABLET, FILM COATED ORAL at 17:50

## 2022-04-27 RX ADMIN — SODIUM CHLORIDE: 4.5 INJECTION, SOLUTION INTRAVENOUS at 11:59

## 2022-04-27 RX ADMIN — INSULIN GLARGINE 60 UNITS: 100 INJECTION, SOLUTION SUBCUTANEOUS at 09:48

## 2022-04-27 RX ADMIN — SODIUM CHLORIDE 7.11 UNITS/HR: 9 INJECTION, SOLUTION INTRAVENOUS at 17:23

## 2022-04-27 RX ADMIN — OXYCODONE HYDROCHLORIDE AND ACETAMINOPHEN 1 TABLET: 5; 325 TABLET ORAL at 19:41

## 2022-04-27 RX ADMIN — ANTACID TABLETS 1000 MG: 500 TABLET, CHEWABLE ORAL at 13:36

## 2022-04-27 RX ADMIN — OXYBUTYNIN CHLORIDE 5 MG: 5 TABLET, EXTENDED RELEASE ORAL at 09:33

## 2022-04-27 RX ADMIN — CLOPIDOGREL 75 MG: 75 TABLET, FILM COATED ORAL at 09:34

## 2022-04-27 RX ADMIN — INSULIN LISPRO 15 UNITS: 100 INJECTION, SOLUTION INTRAVENOUS; SUBCUTANEOUS at 09:50

## 2022-04-27 RX ADMIN — ASPIRIN 81 MG: 81 TABLET, COATED ORAL at 09:35

## 2022-04-27 RX ADMIN — INSULIN HUMAN 20 UNITS: 100 INJECTION, SOLUTION PARENTERAL at 12:52

## 2022-04-27 RX ADMIN — LOSARTAN POTASSIUM 25 MG: 25 TABLET, FILM COATED ORAL at 09:33

## 2022-04-27 RX ADMIN — ANTACID TABLETS 1000 MG: 500 TABLET, CHEWABLE ORAL at 03:50

## 2022-04-27 RX ADMIN — OXYCODONE HYDROCHLORIDE AND ACETAMINOPHEN 1 TABLET: 5; 325 TABLET ORAL at 12:41

## 2022-04-27 RX ADMIN — AZITHROMYCIN 500 MG: 250 TABLET, FILM COATED ORAL at 09:35

## 2022-04-27 RX ADMIN — PANTOPRAZOLE SODIUM 40 MG: 40 TABLET, DELAYED RELEASE ORAL at 21:37

## 2022-04-27 RX ADMIN — ATORVASTATIN CALCIUM 40 MG: 80 TABLET, FILM COATED ORAL at 09:35

## 2022-04-27 RX ADMIN — Medication 1 TABLET: at 09:33

## 2022-04-27 RX ADMIN — SODIUM CHLORIDE 9.04 UNITS/HR: 9 INJECTION, SOLUTION INTRAVENOUS at 18:36

## 2022-04-27 RX ADMIN — INSULIN LISPRO 12 UNITS: 100 INJECTION, SOLUTION INTRAVENOUS; SUBCUTANEOUS at 09:48

## 2022-04-27 RX ADMIN — VITAM B12 50 MCG: 100 TAB at 09:37

## 2022-04-27 RX ADMIN — TOPIRAMATE 100 MG: 100 TABLET, FILM COATED ORAL at 21:37

## 2022-04-27 RX ADMIN — INSULIN GLARGINE 20 UNITS: 100 INJECTION, SOLUTION SUBCUTANEOUS at 11:48

## 2022-04-27 RX ADMIN — MAGNESIUM GLUCONATE 500 MG ORAL TABLET 400 MG: 500 TABLET ORAL at 09:40

## 2022-04-27 RX ADMIN — PANTOPRAZOLE SODIUM 40 MG: 40 TABLET, DELAYED RELEASE ORAL at 09:33

## 2022-04-27 RX ADMIN — Medication 10 MG: at 21:37

## 2022-04-27 RX ADMIN — CEFTRIAXONE SODIUM 1000 MG: 1 INJECTION, POWDER, FOR SOLUTION INTRAMUSCULAR; INTRAVENOUS at 09:36

## 2022-04-27 RX ADMIN — ISOSORBIDE DINITRATE 30 MG: 10 TABLET ORAL at 09:34

## 2022-04-27 RX ADMIN — FERROUS SULFATE TAB EC 325 MG (65 MG FE EQUIVALENT) 325 MG: 325 (65 FE) TABLET DELAYED RESPONSE at 09:34

## 2022-04-27 RX ADMIN — CARVEDILOL 3.12 MG: 3.12 TABLET, FILM COATED ORAL at 09:34

## 2022-04-27 RX ADMIN — INSULIN LISPRO 18 UNITS: 100 INJECTION, SOLUTION INTRAVENOUS; SUBCUTANEOUS at 11:48

## 2022-04-27 RX ADMIN — OMEGA-3-ACID ETHYL ESTERS 1 G: 1 CAPSULE, LIQUID FILLED ORAL at 09:34

## 2022-04-27 RX ADMIN — SODIUM CHLORIDE, PRESERVATIVE FREE 10 ML: 5 INJECTION INTRAVENOUS at 09:37

## 2022-04-27 RX ADMIN — FENOFIBRATE 160 MG: 160 TABLET ORAL at 09:34

## 2022-04-27 RX ADMIN — SODIUM CHLORIDE 11.35 UNITS/HR: 9 INJECTION, SOLUTION INTRAVENOUS at 19:27

## 2022-04-27 RX ADMIN — CITALOPRAM 20 MG: 20 TABLET, FILM COATED ORAL at 09:34

## 2022-04-27 RX ADMIN — METHYLPREDNISOLONE SODIUM SUCCINATE 40 MG: 125 INJECTION, POWDER, FOR SOLUTION INTRAMUSCULAR; INTRAVENOUS at 04:09

## 2022-04-27 ASSESSMENT — PAIN DESCRIPTION - DESCRIPTORS
DESCRIPTORS: DULL
DESCRIPTORS: POUNDING;CRUSHING

## 2022-04-27 ASSESSMENT — PAIN DESCRIPTION - ORIENTATION
ORIENTATION: LEFT
ORIENTATION: ANTERIOR

## 2022-04-27 ASSESSMENT — PAIN DESCRIPTION - LOCATION
LOCATION: CHEST
LOCATION: CHEST;HEAD

## 2022-04-27 ASSESSMENT — PAIN SCALES - GENERAL
PAINLEVEL_OUTOF10: 0
PAINLEVEL_OUTOF10: 7
PAINLEVEL_OUTOF10: 8

## 2022-04-27 NOTE — PROGRESS NOTES
[facility]  Office: [address], DO, Zeusluis a Carlson, DO, Bony Abdalla, DO, Derik Martinez Blood, DO, Peggy Mclaughlin MD, Ena Sanchez MD, Jenny Garcia MD, Giuseppe Mcghee MD, Kiet Islas MD, Heladio Rutherford MD, Josh Jules MD, Uma Hart, DO, Rebecca Concepcion, DO, Hannah Briggs MD,  Chacho Jolley, DO, Galileo Costa MD, Diandra Mc MD, Harrison Alvarado MD, Dorothy Sher DO, Larissa Durbin MD, Trudi Samuel MD, Juan Carlos Schultz, [facility], [facility], [facility], Rick Trivedi, CNP, Charley Whitmore, CNP, Kinjal Davis, CNP, Dayne Velasquez, CNP, STEVO GuallpaC, Nj Monique, [facility], Yessy Levin, CNP, Mary Lemons, CNP, Con Distance, CNP, Maris Fitzpatrick, CNS, Lieutenant Shaikh, [facility], Ursula Thorpe, [facility], [place], CNP, Salome Raza, [facility] [facility]    Progress Note    4/27/2022    10:47 AM    Name:   Toy Gonzalez  MRN:     [id number]     Acct:      [de-identified]   Room:   59/2353-42   Day:  2  Admit Date:  4/25/2022  1:46 PM    PCP:   Coty Clay MD  Code Status:  Full Code    Subjective:     C/C:   Chief Complaint   Patient presents with    Shortness of Breath     Interval History Status: not changed. Patient seen examined this morning. Ate 100% of her breakfast.  Feeling somewhat improved today. Still having intermittent left-sided chest pain. Reports that her breathing is improving. States that she was able to get the bathroom without assistance. Glycemic control has been quite an issue. Now with worsening renal function as well. Brief History:     Ms. Katie Harley is a 66year old female who presented for evaluation of increased SOB over the last several days. She states she does have home oxygen for prn usage that she has been using continuously. She states she has has associated cough and chest pain that is worse when laying down. She denies any fever or chills.  She does have a medical history that includes COPD, DM, HTN and CAD. She also complains of GERD and states she has been out of her home Prilosec. She states she has had a falling out with her PCP and is scheduled to see a new provider in early May. She is compliant with home medications. She is a former smoker (quit 2000), denies use of alcohol or recreational drugs.     -Hyperglycemia has been an issue    Review of Systems:     Constitutional:  negative for chills, fevers, sweats  Respiratory: Reports improvement of shortness of breath; negative for cough  Cardiovascular: Reports left-sided chest wall pain. Negative for lower extremity edema, palpitations  Gastrointestinal:  negative for abdominal pain, constipation, diarrhea, nausea, vomiting  Neurological:  negative for dizziness, headache    Medications: Allergies: Allergies   Allergen Reactions    Robitussin [Guaifenesin] Anaphylaxis    Codeine Swelling    Compazine [Prochlorperazine Maleate] Hives and Swelling    Iodides Itching    Morphine Other (See Comments)     hallucinations    Moxifloxacin      Other reaction(s): Intolerance-unknown  Other reaction(s):  Intolerance-unknown    Reglan [Metoclopramide] Nausea Only    Avelox [Moxifloxacin Hcl In Nacl] Rash     Dermatitis      Cipro Xr Rash     dermatitis    Sulfa Antibiotics Rash       Current Meds:   Scheduled Meds:    methylPREDNISolone  30 mg IntraVENous Q12H    insulin regular  20 Units SubCUTAneous Once    cefTRIAXone (ROCEPHIN) IV  1,000 mg IntraVENous Q24H    insulin lispro  0-18 Units SubCUTAneous TID WC    insulin lispro  0-9 Units SubCUTAneous Nightly    insulin glargine  60 Units SubCUTAneous BID    aspirin  81 mg Oral Daily    atorvastatin  40 mg Oral Daily    carvedilol  3.125 mg Oral BID WC    citalopram  20 mg Oral Daily    clopidogrel  75 mg Oral Daily    fenofibrate  160 mg Oral Daily    ferrous sulfate  325 mg Oral Daily with breakfast    omega-3 acid ethyl esters  1 g Oral Daily    isosorbide dinitrate  30 mg Oral Daily    losartan  25 mg Oral Daily    magnesium oxide  400 mg Oral Daily    therapeutic multivitamin-minerals  1 tablet Oral Daily    oxybutynin  5 mg Oral Daily    pantoprazole  40 mg Oral BID    budesonide-formoterol  2 puff Inhalation Daily    topiramate  100 mg Oral Nightly    vitamin B-12  50 mcg Oral Daily    sodium chloride flush  5-40 mL IntraVENous 2 times per day    azithromycin  500 mg Oral Daily     Continuous Infusions:    dextrose      sodium chloride       PRN Meds: calcium carbonate, oxyCODONE-acetaminophen, cyclobenzaprine, albuterol, ipratropium-albuterol, docusate sodium, ibuprofen, melatonin, glucose, dextrose, glucagon (rDNA), dextrose, sodium chloride flush, sodium chloride, ondansetron **OR** ondansetron    Data:     Past Medical History:   has a past medical history of Abdominal bloating, Adenomatous polyp of ascending colon, Allergic rhinitis, Anxiety, Arthritis, Asthma, Atrial fibrillation ([facility]), Back pain, Benign hypertension with CKD (chronic kidney disease) stage III ([facility]), CAD (coronary artery disease), Caffeine use, CHF (congestive heart failure) ([facility]), Chronic kidney disease, CKD (chronic kidney disease) stage 3, GFR 30-59 ml/min ([facility]), COPD (chronic obstructive pulmonary disease) ([facility]), Degeneration of lumbar or lumbosacral intervertebral disc, Depression, DM (diabetes mellitus) ([facility] [address] Emphysema of lung ([facility] Gastritis, GERD (gastroesophageal reflux disease), Hearing loss, Hematuria, Hiatal hernia, HTN (hypertension), benign, Hypertriglyceridemia, Incontinence of urine, Irritable bowel syndrome with both constipation and diarrhea, Knee arthropathy, Lumbosacral spondylosis without myelopathy, Migraine headache, Mumps, Neuropathy, Obesity, On home oxygen therapy, HIGINIO on CPAP, Otitis media, Secondary diabetes mellitus with stage 3 chronic kidney disease (GFR 30-59) ([address] Stroke ([facility] [facility] Tinnitus, Type 2 diabetes mellitus without complication ([facility] Type II or unspecified type diabetes mellitus without mention of complication, not stated as uncontrolled, UTI (lower urinary tract infection), and Varicose vein. Social History:   reports that she quit smoking about 22 years ago. Her smoking use included cigarettes. She has a 123.00 pack-year smoking history. She has never used smokeless tobacco. She reports that she does not drink alcohol and does not use drugs. Family History:   Family History   Problem Relation Age of Onset    Diabetes Mother     Heart Disease Mother     Stomach Cancer Father     Diabetes Maternal Grandmother         also aunts and uncles (maternal)    Emphysema Sister        Vitals:  /60   Pulse 66   Temp 97.6 °F (36.4 °C) (Oral)   Resp 18   Ht 5' 2\" (1.575 m)   Wt 234 lb (106.1 kg)   LMP  (LMP Unknown)   SpO2 93%   BMI 42.80 kg/m²   Temp (24hrs), Av.6 °F (36.4 °C), Min:97.6 °F (36.4 °C), Max:97.6 °F (36.4 °C)    Recent Labs     22  1851 22  2201 22  0747 22  1038   POCGLU 353* 427* 348* 488*       I/O (24Hr):   No intake or output data in the 24 hours ending 22 1047    Labs:  Hematology:  Recent Labs     22  1356 22  0343   WBC 7.8 16.7*   RBC 4.77 4.09   HGB 14.8 12.6   HCT 44.5 38.1   MCV 93.3 93.2   MCH 31.0 30.8   MCHC 33.3 33.1   RDW 12.4 12.5    200   MPV 12.3 11.7   DDIMER 0.64  --      Chemistry:  Recent Labs     22  1415 22  1915 22  0733   *  --  133*   K 3.8  --  4.9   CL 95*  --  96*   CO2 25  --  26   GLUCOSE 300*  --  358*   BUN 13  --  42*   CREATININE 1.06*  --  1.51*   ANIONGAP 14  --  11   LABGLOM 51*  --  34*   GFRAA >60  --  41*   CALCIUM 9.2  --  9.2   TROPHS 8 7  --      Recent Labs     22  1356 22  2107 22  1258 22  1531 22  1851 22  2201 22  0747 22  1038   PROT 7.6  --   --   --   --   --   --   --    LABALBU 4.1  --   --   --   --   --   --   --    AST 58*  -- --   --   --   --   --   --    ALT 52*  --   --   --   --   --   --   --    ALKPHOS 76  --   --   --   --   --   --   --    BILITOT 0.32  --   --   --   --   --   --   --    BILIDIR <0.08  --   --   --   --   --   --   --    LIPASE 52  --   --   --   --   --   --   --    POCGLU  --    < > 263* 290* 353* 427* 348* 488*    < > = values in this interval not displayed. ABG:  Lab Results   Component Value Date    POCPH 7.239 10/06/2021    PH 7.346 11/21/2011    POCPCO2 77.6 10/06/2021    PCO2 49.8 11/21/2011    POCPO2 137.2 10/06/2021    PO2ART 61.3 11/21/2011    POCHCO3 33.1 10/06/2021    LVU1QJQ 26.5 11/21/2011    NBEA NOT REPORTED 10/06/2021    PBEA 3 10/06/2021    TDO7XFN NOT REPORTED 10/06/2021    BGIT2CEV 98 10/06/2021    Q0JKPUTP 89.7 11/21/2011    FIO2 UNKNOWN 04/25/2022     Lab Results   Component Value Date/Time    SPECIAL NOT REPORTED 12/31/2021 12:36 PM     Lab Results   Component Value Date/Time    CULTURE ENTEROCOCCUS FAECALIS 10 to 50,000 CFU/ML (A) 12/31/2021 12:36 PM    CULTURE PRESUMPTIVE CANDIDA ALBICANS 10 to 50,000 CFU/ML (A) 12/31/2021 12:36 PM       Radiology:  XR CHEST PORTABLE    Result Date: 4/25/2022  No acute process. Physical Examination:        General appearance:  alert, cooperative and no distress, morbidly obese  female, sitting up in bed. Mental Status:  oriented to person, place and time and normal affect  HEENT: Supple cannula present, EOMI, sclera anti-icteric, large neck circumference  Lungs:  clear to auscultation bilaterally, expiratory wheezing has resolved.  No rhonchi, normal effort  Heart:  regular rate and rhythm, no murmur  Abdomen:  soft, nontender, nondistended, normal bowel sounds, protuberant  Extremities:  no edema, redness, tenderness in the calves  Skin:  no gross lesions, rashes, induration    Assessment:        Hospital Problems           Last Modified POA    * (Principal) COPD exacerbation ([facility] 4/25/2022 Yes    Type 2 diabetes mellitus with diabetic polyneuropathy, with long-term current use of insulin (HCC) (Chronic) 4/25/2022 Yes    YAJAIRA (acute kidney injury) ([facility] [facility] 4/27/2022 No    Asthma with COPD ([facility] [facility] 4/26/2022 Yes    GERD (gastroesophageal reflux disease) (Chronic) 4/26/2022 Yes    Primary hypertension 4/26/2022 Yes    Mixed hyperlipidemia (Chronic) 4/26/2022 Yes    Overview Signed 9/7/2015  4:50 AM by [facility]     replace inactive diagnosis         Obesity, Class III, BMI 40-49.9 (morbid obesity) ([facility] [address] 4/26/2022 Yes    Stage 3 chronic kidney disease ([facility] [address] 4/26/2022 Yes    Benign hypertension with CKD (chronic kidney disease) stage III ([facility] [address] 4/26/2022 Yes    Neutrophilic leukocytosis 5/09/7067 No          Plan:        Acute exacerbation of COPD-Stop solumedrol as her wheezing has significantly improved and she is quite hyperglycemic. Schedule prednisone 40 mg to start tomorrow. Will taper. Rocephin/azithromycin for antibiotic/antiinflammatory therapy. Continue updrafts. Albuterol as needed. Check respiratory culture. YAJAIRA - new today. Uncertain as to the etiology. NSAIDs given yesterday. Will stop all non-necessary nephrotoxic agents. Hold losartan. Stop ibuprofen. Check bladder scans for PVRs. Noncardiac chest pain- Toradol x1 did not really help the pain. Flexeril as needed. Avoid NSAIDs moving forward. Type 2 diabetes mellitus with hyperglycemia-continue Lantus to 60 units twice daily. Give extra dose of Lantus 20 units this morning along with 20 units of regular insulin. High dose ISS at mealtime as well. Transaminitis- trend. Repeat tomorrow. Morbid obesity with BMI of 42.80- diet/weight loss/exercise recommended  Leukocytosis-likely secondary to steroid administration.     33 Agnes Stoddard DO  4/27/2022  10:47 AM

## 2022-04-27 NOTE — PROGRESS NOTES
Pt arrived to unit as a transfer from [facility] [address]. Upon arrival pt had no complaints of pain. Assessment completed and meds given per MD orders. Insulin drip double checked with Charge RN prior to administration. Pt is tolerating well and BS is coming down slightly. Call light in reach. Will relinquish care to oncoming RN at shift change.

## 2022-04-28 LAB
ABSOLUTE EOS #: <0.03 K/UL (ref 0–0.44)
ABSOLUTE IMMATURE GRANULOCYTE: 0.14 K/UL (ref 0–0.3)
ABSOLUTE LYMPH #: 2.88 K/UL (ref 1.1–3.7)
ABSOLUTE MONO #: 0.93 K/UL (ref 0.1–1.2)
ALBUMIN SERPL-MCNC: 3.6 G/DL (ref 3.5–5.2)
ALBUMIN/GLOBULIN RATIO: 1.4 (ref 1–2.5)
ALP BLD-CCNC: 50 U/L (ref 35–104)
ALT SERPL-CCNC: 26 U/L (ref 5–33)
ANION GAP SERPL CALCULATED.3IONS-SCNC: 7 MMOL/L (ref 9–17)
AST SERPL-CCNC: 18 U/L
BASOPHILS # BLD: 0 % (ref 0–2)
BASOPHILS ABSOLUTE: 0.03 K/UL (ref 0–0.2)
BILIRUB SERPL-MCNC: 0.2 MG/DL (ref 0.3–1.2)
BILIRUBIN DIRECT: <0.08 MG/DL
BILIRUBIN, INDIRECT: ABNORMAL MG/DL (ref 0–1)
BUN BLDV-MCNC: 48 MG/DL (ref 8–23)
CALCIUM SERPL-MCNC: 8.9 MG/DL (ref 8.6–10.4)
CHLORIDE BLD-SCNC: 99 MMOL/L (ref 98–107)
CO2: 28 MMOL/L (ref 20–31)
CREAT SERPL-MCNC: 1.6 MG/DL (ref 0.5–0.9)
EOSINOPHILS RELATIVE PERCENT: 0 % (ref 1–4)
GFR AFRICAN AMERICAN: 38 ML/MIN
GFR NON-AFRICAN AMERICAN: 32 ML/MIN
GFR SERPL CREATININE-BSD FRML MDRD: ABNORMAL ML/MIN/{1.73_M2}
GLUCOSE BLD-MCNC: 113 MG/DL (ref 65–105)
GLUCOSE BLD-MCNC: 251 MG/DL (ref 65–105)
GLUCOSE BLD-MCNC: 270 MG/DL (ref 65–105)
GLUCOSE BLD-MCNC: 295 MG/DL (ref 65–105)
GLUCOSE BLD-MCNC: 70 MG/DL (ref 65–105)
GLUCOSE BLD-MCNC: 71 MG/DL (ref 65–105)
GLUCOSE BLD-MCNC: 72 MG/DL (ref 65–105)
GLUCOSE BLD-MCNC: 73 MG/DL (ref 65–105)
GLUCOSE BLD-MCNC: 77 MG/DL (ref 65–105)
GLUCOSE BLD-MCNC: 93 MG/DL (ref 65–105)
GLUCOSE BLD-MCNC: 95 MG/DL (ref 70–99)
HCT VFR BLD CALC: 38 % (ref 36.3–47.1)
HEMOGLOBIN: 12.5 G/DL (ref 11.9–15.1)
IMMATURE GRANULOCYTES: 1 %
LYMPHOCYTES # BLD: 22 % (ref 24–43)
MCH RBC QN AUTO: 31.1 PG (ref 25.2–33.5)
MCHC RBC AUTO-ENTMCNC: 32.9 G/DL (ref 28.4–34.8)
MCV RBC AUTO: 94.5 FL (ref 82.6–102.9)
MONOCYTES # BLD: 7 % (ref 3–12)
NRBC AUTOMATED: 0 PER 100 WBC
PDW BLD-RTO: 12.5 % (ref 11.8–14.4)
PLATELET # BLD: 189 K/UL (ref 138–453)
PMV BLD AUTO: 11.9 FL (ref 8.1–13.5)
POTASSIUM SERPL-SCNC: 4.1 MMOL/L (ref 3.7–5.3)
RBC # BLD: 4.02 M/UL (ref 3.95–5.11)
SEG NEUTROPHILS: 70 % (ref 36–65)
SEGMENTED NEUTROPHILS ABSOLUTE COUNT: 9.22 K/UL (ref 1.5–8.1)
SODIUM BLD-SCNC: 134 MMOL/L (ref 135–144)
TOTAL PROTEIN: 6.1 G/DL (ref 6.4–8.3)
WBC # BLD: 13.2 K/UL (ref 3.5–11.3)

## 2022-04-28 PROCEDURE — 6370000000 HC RX 637 (ALT 250 FOR IP): Performed by: INTERNAL MEDICINE

## 2022-04-28 PROCEDURE — 2700000000 HC OXYGEN THERAPY PER DAY

## 2022-04-28 PROCEDURE — 80076 HEPATIC FUNCTION PANEL: CPT

## 2022-04-28 PROCEDURE — 6360000002 HC RX W HCPCS: Performed by: INTERNAL MEDICINE

## 2022-04-28 PROCEDURE — 6370000000 HC RX 637 (ALT 250 FOR IP): Performed by: NURSE PRACTITIONER

## 2022-04-28 PROCEDURE — 36415 COLL VENOUS BLD VENIPUNCTURE: CPT

## 2022-04-28 PROCEDURE — 85025 COMPLETE CBC W/AUTO DIFF WBC: CPT

## 2022-04-28 PROCEDURE — 94640 AIRWAY INHALATION TREATMENT: CPT

## 2022-04-28 PROCEDURE — 80048 BASIC METABOLIC PNL TOTAL CA: CPT

## 2022-04-28 PROCEDURE — 94761 N-INVAS EAR/PLS OXIMETRY MLT: CPT

## 2022-04-28 PROCEDURE — 2500000003 HC RX 250 WO HCPCS: Performed by: INTERNAL MEDICINE

## 2022-04-28 PROCEDURE — 2580000003 HC RX 258: Performed by: NURSE PRACTITIONER

## 2022-04-28 PROCEDURE — 99233 SBSQ HOSP IP/OBS HIGH 50: CPT | Performed by: INTERNAL MEDICINE

## 2022-04-28 PROCEDURE — 2060000000 HC ICU INTERMEDIATE R&B

## 2022-04-28 PROCEDURE — 82947 ASSAY GLUCOSE BLOOD QUANT: CPT

## 2022-04-28 RX ORDER — OXYCODONE HYDROCHLORIDE AND ACETAMINOPHEN 5; 325 MG/1; MG/1
1 TABLET ORAL EVERY 4 HOURS PRN
Status: DISCONTINUED | OUTPATIENT
Start: 2022-04-28 | End: 2022-05-01 | Stop reason: HOSPADM

## 2022-04-28 RX ORDER — INSULIN GLARGINE 100 [IU]/ML
70 INJECTION, SOLUTION SUBCUTANEOUS 2 TIMES DAILY
Status: DISCONTINUED | OUTPATIENT
Start: 2022-04-28 | End: 2022-04-29

## 2022-04-28 RX ORDER — OXYCODONE HYDROCHLORIDE AND ACETAMINOPHEN 5; 325 MG/1; MG/1
2 TABLET ORAL EVERY 4 HOURS PRN
Status: DISCONTINUED | OUTPATIENT
Start: 2022-04-28 | End: 2022-05-01 | Stop reason: HOSPADM

## 2022-04-28 RX ADMIN — FERROUS SULFATE TAB EC 325 MG (65 MG FE EQUIVALENT) 325 MG: 325 (65 FE) TABLET DELAYED RESPONSE at 08:24

## 2022-04-28 RX ADMIN — SODIUM CHLORIDE, PRESERVATIVE FREE 10 ML: 5 INJECTION INTRAVENOUS at 09:00

## 2022-04-28 RX ADMIN — ISOSORBIDE DINITRATE 30 MG: 10 TABLET ORAL at 08:23

## 2022-04-28 RX ADMIN — BUDESONIDE AND FORMOTEROL FUMARATE DIHYDRATE 2 PUFF: 160; 4.5 AEROSOL RESPIRATORY (INHALATION) at 07:30

## 2022-04-28 RX ADMIN — OXYCODONE HYDROCHLORIDE AND ACETAMINOPHEN 1 TABLET: 5; 325 TABLET ORAL at 06:54

## 2022-04-28 RX ADMIN — OMEGA-3-ACID ETHYL ESTERS 1 G: 1 CAPSULE, LIQUID FILLED ORAL at 08:25

## 2022-04-28 RX ADMIN — INSULIN LISPRO 5 UNITS: 100 INJECTION, SOLUTION INTRAVENOUS; SUBCUTANEOUS at 20:37

## 2022-04-28 RX ADMIN — TOPIRAMATE 100 MG: 100 TABLET, FILM COATED ORAL at 20:36

## 2022-04-28 RX ADMIN — CARVEDILOL 3.12 MG: 3.12 TABLET, FILM COATED ORAL at 16:10

## 2022-04-28 RX ADMIN — CLOPIDOGREL 75 MG: 75 TABLET, FILM COATED ORAL at 08:26

## 2022-04-28 RX ADMIN — CEFTRIAXONE SODIUM 1000 MG: 1 INJECTION, POWDER, FOR SOLUTION INTRAMUSCULAR; INTRAVENOUS at 08:30

## 2022-04-28 RX ADMIN — MIDODRINE HYDROCHLORIDE 5 MG: 5 TABLET ORAL at 00:35

## 2022-04-28 RX ADMIN — AZITHROMYCIN 500 MG: 250 TABLET, FILM COATED ORAL at 08:30

## 2022-04-28 RX ADMIN — OXYCODONE HYDROCHLORIDE AND ACETAMINOPHEN 2 TABLET: 5; 325 TABLET ORAL at 20:36

## 2022-04-28 RX ADMIN — INSULIN LISPRO 9 UNITS: 100 INJECTION, SOLUTION INTRAVENOUS; SUBCUTANEOUS at 16:05

## 2022-04-28 RX ADMIN — ASPIRIN 81 MG: 81 TABLET, COATED ORAL at 08:24

## 2022-04-28 RX ADMIN — VITAM B12 50 MCG: 100 TAB at 08:25

## 2022-04-28 RX ADMIN — CARVEDILOL 3.12 MG: 3.12 TABLET, FILM COATED ORAL at 08:25

## 2022-04-28 RX ADMIN — INSULIN GLARGINE 70 UNITS: 100 INJECTION, SOLUTION SUBCUTANEOUS at 20:37

## 2022-04-28 RX ADMIN — ATORVASTATIN CALCIUM 40 MG: 80 TABLET, FILM COATED ORAL at 08:24

## 2022-04-28 RX ADMIN — FENOFIBRATE 160 MG: 160 TABLET ORAL at 08:25

## 2022-04-28 RX ADMIN — PANTOPRAZOLE SODIUM 40 MG: 40 TABLET, DELAYED RELEASE ORAL at 20:36

## 2022-04-28 RX ADMIN — OXYBUTYNIN CHLORIDE 5 MG: 5 TABLET, EXTENDED RELEASE ORAL at 08:25

## 2022-04-28 RX ADMIN — INSULIN LISPRO 9 UNITS: 100 INJECTION, SOLUTION INTRAVENOUS; SUBCUTANEOUS at 12:46

## 2022-04-28 RX ADMIN — PREDNISONE 40 MG: 20 TABLET ORAL at 08:24

## 2022-04-28 RX ADMIN — PANTOPRAZOLE SODIUM 40 MG: 40 TABLET, DELAYED RELEASE ORAL at 08:26

## 2022-04-28 RX ADMIN — INSULIN GLARGINE 60 UNITS: 100 INJECTION, SOLUTION SUBCUTANEOUS at 09:00

## 2022-04-28 RX ADMIN — SODIUM CHLORIDE, PRESERVATIVE FREE 10 ML: 5 INJECTION INTRAVENOUS at 20:37

## 2022-04-28 RX ADMIN — Medication 10 MG: at 20:36

## 2022-04-28 RX ADMIN — MAGNESIUM GLUCONATE 500 MG ORAL TABLET 400 MG: 500 TABLET ORAL at 08:25

## 2022-04-28 RX ADMIN — CITALOPRAM 20 MG: 20 TABLET, FILM COATED ORAL at 08:25

## 2022-04-28 RX ADMIN — SODIUM CHLORIDE, PRESERVATIVE FREE 10 ML: 5 INJECTION INTRAVENOUS at 08:34

## 2022-04-28 RX ADMIN — Medication 1 TABLET: at 08:25

## 2022-04-28 ASSESSMENT — PAIN SCALES - GENERAL
PAINLEVEL_OUTOF10: 7
PAINLEVEL_OUTOF10: 8
PAINLEVEL_OUTOF10: 8

## 2022-04-28 ASSESSMENT — PAIN DESCRIPTION - ONSET: ONSET: ON-GOING

## 2022-04-28 ASSESSMENT — PAIN DESCRIPTION - PAIN TYPE: TYPE: ACUTE PAIN

## 2022-04-28 ASSESSMENT — PAIN DESCRIPTION - DESCRIPTORS: DESCRIPTORS: ACHING

## 2022-04-28 ASSESSMENT — PAIN - FUNCTIONAL ASSESSMENT: PAIN_FUNCTIONAL_ASSESSMENT: ACTIVITIES ARE NOT PREVENTED

## 2022-04-28 ASSESSMENT — PAIN DESCRIPTION - FREQUENCY: FREQUENCY: INTERMITTENT

## 2022-04-28 ASSESSMENT — PAIN DESCRIPTION - LOCATION: LOCATION: HEAD

## 2022-04-28 NOTE — PROGRESS NOTES
On call NP messaged via perfect serve with a message stating     \"Pt's BPs are running soft tonight. Pt states her BP is lower at night. Her SBPs are upper 80s after multiple checks in different locations on both arms. DBP is in the upper 40s to low 50s. \"    Fluids are running at 75ml/hr.

## 2022-04-28 NOTE — PLAN OF CARE
Problem: Pain  Goal: Verbalizes/displays adequate comfort level or baseline comfort level  4/28/2022 1148 by Dae Talamantes RN  Outcome: Progressing  4/28/2022 0345 by Kushal Cha RN  Outcome: Progressing     Problem: Safety - Adult  Goal: Free from fall injury  4/28/2022 1148 by Dae Talamantes RN  Outcome: Progressing  4/28/2022 0345 by Kushal Cha RN  Outcome: Progressing     Problem: Chronic Conditions and Co-morbidities  Goal: Patient's chronic conditions and co-morbidity symptoms are monitored and maintained or improved  Outcome: Progressing     Problem: Discharge Planning  Goal: Discharge to home or other facility with appropriate resources  Outcome: Progressing     Problem: ABCDS Injury Assessment  Goal: Absence of physical injury  Outcome: Progressing

## 2022-04-28 NOTE — PROGRESS NOTES
[facility]  Office: [address], DO, Анна Browner, DO, Michelle Manifold, DO, Bon Wooten Blood, DO, Maureen Patel MD, Shaka Wan MD, Sharan Bassett MD, Curt Parrish MD, Miah Valiente MD, Tamika Ford MD, Arie Santana MD, Nato Rosas, DO, Theodora Alfonso, DO, Deirdre Herzog MD,  Noel Dan, DO, Norris Holter, MD, Stacey Greco MD, Urvashi Nathan MD, Modesta Silveira DO, Flako Mora MD, Naomi Guzman MD, Amira Arias CNP, [facility], CNP, Orville Holly CNP, Eric Mccullough, CNP, Camila Patel, CNP, Florence Enciso CNP, Je Dubon, PA-C, Jose Starkey, DNP, Krystina Haynes, CNP, Abigail Heart, CNP, Nitesh Hodges, CNP, Aayush Collins, [facility], Melba Araujo, DNP, Amy Sharpe, CNP, Ana Mackey, CNP, [facility], [facility]    Progress Note    4/28/2022    2:15 PM    Name:   Natasha Dotson  MRN:     [id number]     Acct:      [de-identified]   Room:   [address] [place] [address] Day:  3  Admit Date:  4/25/2022  1:46 PM    PCP:   John Mondragon MD  Code Status:  Full Code    Subjective:     C/C:   Chief Complaint   Patient presents with    Shortness of Breath     Interval History Status: not changed. Patient seen examined this afternoon. No acute events overnight. Transferred to a stepdown unit for insulin infusion, as her glucoses were significantly elevated. Continues to complain of left-sided chest pain, but continues to say \"but it's getting better. \"     Brief History:     Ms. Chanda Rowe is a 66year old female who presented for evaluation of increased SOB over the last several days. She states she does have home oxygen for prn usage that she has been using continuously. She states she has has associated cough and chest pain that is worse when laying down. She denies any fever or chills. She does have a medical history that includes COPD, DM, HTN and CAD.  She also complains of GERD and states she has been out of her home Prilosec. She states she has had a falling out with her PCP and is scheduled to see a new provider in early May. She is compliant with home medications. She is a former smoker (quit 2000), denies use of alcohol or recreational drugs.     -Hyperglycemia has been an issue, transferred to stepdown on 4/27 for insulin infusion.  -Insulin drip off 4/28. Re-initiation of SQ insulin. Review of Systems:     Constitutional:  negative for chills, fevers, sweats  Respiratory: Reports stable shortness of breath; negative for cough  Cardiovascular: Reports left-sided chest wall pain. Negative for lower extremity edema, palpitations  Gastrointestinal:  negative for abdominal pain, constipation, diarrhea, nausea, vomiting  Neurological:  negative for dizziness, headache    Medications: Allergies: Allergies   Allergen Reactions    Robitussin [Guaifenesin] Anaphylaxis    Codeine Swelling    Compazine [Prochlorperazine Maleate] Hives and Swelling    Iodides Itching    Morphine Other (See Comments)     hallucinations    Moxifloxacin      Other reaction(s): Intolerance-unknown  Other reaction(s):  Intolerance-unknown    Reglan [Metoclopramide] Nausea Only    Avelox [Moxifloxacin Hcl In Nacl] Rash     Dermatitis      Cipro Xr Rash     dermatitis    Sulfa Antibiotics Rash       Current Meds:   Scheduled Meds:    insulin glargine  70 Units SubCUTAneous BID    predniSONE  40 mg Oral Daily    cefTRIAXone (ROCEPHIN) IV  1,000 mg IntraVENous Q24H    insulin lispro  0-18 Units SubCUTAneous TID WC    insulin lispro  0-9 Units SubCUTAneous Nightly    aspirin  81 mg Oral Daily    atorvastatin  40 mg Oral Daily    carvedilol  3.125 mg Oral BID WC    citalopram  20 mg Oral Daily    clopidogrel  75 mg Oral Daily    fenofibrate  160 mg Oral Daily    ferrous sulfate  325 mg Oral Daily with breakfast    omega-3 acid ethyl esters  1 g Oral Daily    isosorbide dinitrate  30 mg Oral Daily    [Held by provider] losartan  25 mg Oral Daily    magnesium oxide  400 mg Oral Daily    therapeutic multivitamin-minerals  1 tablet Oral Daily    oxybutynin  5 mg Oral Daily    pantoprazole  40 mg Oral BID    budesonide-formoterol  2 puff Inhalation Daily    topiramate  100 mg Oral Nightly    vitamin B-12  50 mcg Oral Daily    sodium chloride flush  5-40 mL IntraVENous 2 times per day     Continuous Infusions:    sodium chloride 75 mL/hr at 04/27/22 1159    dextrose      sodium chloride       PRN Meds: oxyCODONE-acetaminophen **OR** oxyCODONE-acetaminophen, calcium carbonate, cyclobenzaprine, albuterol, ipratropium-albuterol, docusate sodium, melatonin, glucose, dextrose, glucagon (rDNA), dextrose, sodium chloride flush, sodium chloride, ondansetron **OR** ondansetron    Data:     Past Medical History:   has a past medical history of Abdominal bloating, Adenomatous polyp of ascending colon, Allergic rhinitis, Anxiety, Arthritis, Asthma, Atrial fibrillation ([facility] [address] Back pain, Benign hypertension with CKD (chronic kidney disease) stage III ([address] CAD (coronary artery disease), Caffeine use, CHF (congestive heart failure) ([address] Chronic kidney disease, CKD (chronic kidney disease) stage 3, GFR 30-59 ml/min ([facility] COPD (chronic obstructive pulmonary disease) ([address] Degeneration of lumbar or lumbosacral intervertebral disc, Depression, DM (diabetes mellitus) ([address] Emphysema of lung ([address] Gastritis, GERD (gastroesophageal reflux disease), Hearing loss, Hematuria, Hiatal hernia, HTN (hypertension), benign, Hypertriglyceridemia, Incontinence of urine, Irritable bowel syndrome with both constipation and diarrhea, Knee arthropathy, Lumbosacral spondylosis without myelopathy, Migraine headache, Mumps, Neuropathy, Obesity, On home oxygen therapy, HIGINIO on CPAP, Otitis media, Secondary diabetes mellitus with stage 3 chronic kidney disease (GFR 30-59) ([address] Stroke ([address] Tinnitus, Type 2 diabetes mellitus without complication ([facility] [address] Type II or unspecified type diabetes mellitus without mention of complication, not stated as uncontrolled, UTI (lower urinary tract infection), and Varicose vein. Social History:   reports that she quit smoking about 22 years ago. Her smoking use included cigarettes. She has a 123.00 pack-year smoking history. She has never used smokeless tobacco. She reports that she does not drink alcohol and does not use drugs. Family History:   Family History   Problem Relation Age of Onset    Diabetes Mother     Heart Disease Mother     Stomach Cancer Father     Diabetes Maternal Grandmother         also aunts and uncles (maternal)    Emphysema Sister        Vitals:  BP (!) 101/57   Pulse 77   Temp 99 °F (37.2 °C) (Temporal)   Resp 25   Ht 5' 2\" (1.575 m)   Wt 234 lb (106.1 kg)   LMP  (LMP Unknown)   SpO2 93%   BMI 42.80 kg/m²   Temp (24hrs), Av.2 °F (36.8 °C), Min:96.5 °F (35.8 °C), Max:99 °F (37.2 °C)    Recent Labs     22  0501 22  0612 22  0815 22  1203   POCGLU 77 113* 93 270*       I/O (24Hr):     Intake/Output Summary (Last 24 hours) at 2022 1415  Last data filed at 2022 1146  Gross per 24 hour   Intake 4684 ml   Output 1225 ml   Net 3459 ml       Labs:  Hematology:  Recent Labs     22  0343 22  0540   WBC 16.7* 13.2*   RBC 4.09 4.02   HGB 12.6 12.5   HCT 38.1 38.0   MCV 93.2 94.5   MCH 30.8 31.1   MCHC 33.1 32.9   RDW 12.5 12.5    189   MPV 11.7 11.9     Chemistry:  Recent Labs     22  1915 22  0733 22  2152 22  0540   NA  --  133* 132* 134*   K  --  4.9 4.8 4.1   CL  --  96* 98 99   CO2  --  26 26 28   GLUCOSE  --  358* 190* 95   BUN  --  42* 52* 48*   CREATININE  --  1.51* 1.72* 1.60*   ANIONGAP  --  11 8* 7*   LABGLOM  --  34* 29* 32*   GFRAA  --  41* 35* 38*   CALCIUM  --  9.2 8.7 8.9   TROPHS 7  --   --   --      Recent Labs     22  0247 22  0346 22  0501 22  0540 04/28/22  0612 04/28/22  0815 04/28/22  1203   PROT  --   --   --  6.1*  --   --   --    LABALBU  --   --   --  3.6  --   --   --    AST  --   --   --  18  --   --   --    ALT  --   --   --  26  --   --   --    ALKPHOS  --   --   --  50  --   --   --    BILITOT  --   --   --  0.20*  --   --   --    BILIDIR  --   --   --  <0.08  --   --   --    POCGLU 73 71 77  --  113* 93 270*     ABG:  Lab Results   Component Value Date    POCPH 7.239 10/06/2021    PH 7.346 11/21/2011    POCPCO2 77.6 10/06/2021    PCO2 49.8 11/21/2011    POCPO2 137.2 10/06/2021    PO2ART 61.3 11/21/2011    POCHCO3 33.1 10/06/2021    RSN9XXQ 26.5 11/21/2011    NBEA NOT REPORTED 10/06/2021    PBEA 3 10/06/2021    UTE9SRQ NOT REPORTED 10/06/2021    KHFW9OLU 98 10/06/2021    Y8ADWXMF 89.7 11/21/2011    FIO2 UNKNOWN 04/25/2022     Lab Results   Component Value Date/Time    SPECIAL NOT REPORTED 12/31/2021 12:36 PM     Lab Results   Component Value Date/Time    CULTURE ENTEROCOCCUS FAECALIS 10 to 50,000 CFU/ML (A) 12/31/2021 12:36 PM    CULTURE PRESUMPTIVE CANDIDA ALBICANS 10 to 50,000 CFU/ML (A) 12/31/2021 12:36 PM       Radiology:  XR CHEST PORTABLE    Result Date: 4/25/2022  No acute process. Physical Examination:        General appearance:  alert, cooperative and no distress, morbidly obese  female, sitting up in bed. Mental Status:  oriented to person, place and time and normal affect  HEENT: Supple cannula present, EOMI, sclera anti-icteric, large neck circumference  Lungs:  clear to auscultation bilaterally, expiratory wheezing has resolved.  No rhonchi, normal effort  Heart:  regular rate and rhythm, no murmur  Abdomen:  soft, nontender, nondistended, normal bowel sounds, protuberant  Extremities:  no edema, redness, tenderness in the calves  Skin:  no gross lesions, rashes, induration    Assessment:        Hospital Problems           Last Modified POA    * (Principal) COPD exacerbation [facility] 4/25/2022 Yes    Type 2 diabetes mellitus with diabetic polyneuropathy, with long-term current use of insulin (HCC) (Chronic) 4/25/2022 Yes    YAJAIRA (acute kidney injury) ([facility] [address] 4/27/2022 No    Asthma with COPD ([facility] [address] 4/26/2022 Yes    GERD (gastroesophageal reflux disease) (Chronic) 4/26/2022 Yes    Primary hypertension 4/26/2022 Yes    Mixed hyperlipidemia (Chronic) 4/26/2022 Yes    Overview Signed 9/7/2015  4:50 AM by [facility]     replace inactive diagnosis         Obesity, Class III, BMI 40-49.9 (morbid obesity) ([facility] [address] 4/26/2022 Yes    Stage 3 chronic kidney disease ([facility] [address] 4/26/2022 Yes    Benign hypertension with CKD (chronic kidney disease) stage III ([facility] [address] 4/26/2022 Yes    Neutrophilic leukocytosis 1/70/2272 No          Plan:        Acute exacerbation of COPD-Continue prednisone 40 mg qd at present. Will taper at discharge. Continue rocephin/azithromycin for antibiotic/antiinflammatory therapy. Last day of azithro today. Continue updrafts. Albuterol as needed. Check respiratory culture. YAJAIRA - slowly improving today. Uncertain as to the etiology. Maintain off all non-necessary nephrotoxic agents. Hold losartan at present. Check bladder scans for PVRs. Noncardiac chest pain - increased percocet to moderate/severe scale. Prn flexeril  Type 2 diabetes mellitus with hyperglycemia-insulin infusion overnight. Improvement of glycemic control. Patient takes Trulicity weekly at home. Lantus BID and a sliding scale. Hyperglycemic due to steroid admin. Increase lantus to 70 units bid to start tonight. Continue ISS. Transaminitis - resolved. Morbid obesity with BMI of 42.80- diet/weight loss/exercise recommended  Leukocytosis-likely secondary to steroid administration. Dispo: 24 more hours. YAJAIRA slowly resolving. Working on improving chest pain and glycemic control.     33 Agnes Stoddard DO  4/28/2022  2:15 PM

## 2022-04-29 LAB
ABSOLUTE EOS #: 0.15 K/UL (ref 0–0.4)
ABSOLUTE IMMATURE GRANULOCYTE: 0.15 K/UL (ref 0–0.3)
ABSOLUTE LYMPH #: 4.62 K/UL (ref 1–4.8)
ABSOLUTE MONO #: 1.19 K/UL (ref 0.1–0.8)
ANION GAP SERPL CALCULATED.3IONS-SCNC: 10 MMOL/L (ref 9–17)
BASOPHILS # BLD: 0 % (ref 0–2)
BASOPHILS ABSOLUTE: 0 K/UL (ref 0–0.2)
BUN BLDV-MCNC: 42 MG/DL (ref 8–23)
CALCIUM SERPL-MCNC: 8.8 MG/DL (ref 8.6–10.4)
CHLORIDE BLD-SCNC: 104 MMOL/L (ref 98–107)
CO2: 28 MMOL/L (ref 20–31)
CREAT SERPL-MCNC: 1.51 MG/DL (ref 0.5–0.9)
EOSINOPHILS RELATIVE PERCENT: 1 % (ref 1–4)
GFR AFRICAN AMERICAN: 41 ML/MIN
GFR NON-AFRICAN AMERICAN: 34 ML/MIN
GFR SERPL CREATININE-BSD FRML MDRD: ABNORMAL ML/MIN/{1.73_M2}
GLUCOSE BLD-MCNC: 105 MG/DL (ref 65–105)
GLUCOSE BLD-MCNC: 18 MG/DL (ref 65–105)
GLUCOSE BLD-MCNC: 21 MG/DL (ref 65–105)
GLUCOSE BLD-MCNC: 278 MG/DL (ref 65–105)
GLUCOSE BLD-MCNC: 300 MG/DL (ref 65–105)
GLUCOSE BLD-MCNC: 301 MG/DL (ref 65–105)
GLUCOSE BLD-MCNC: 31 MG/DL (ref 65–105)
GLUCOSE BLD-MCNC: 33 MG/DL (ref 70–99)
GLUCOSE BLD-MCNC: 333 MG/DL (ref 65–105)
GLUCOSE BLD-MCNC: 335 MG/DL (ref 65–105)
GLUCOSE BLD-MCNC: 59 MG/DL (ref 65–105)
GLUCOSE BLD-MCNC: 63 MG/DL (ref 65–105)
HCT VFR BLD CALC: 41.9 % (ref 36.3–47.1)
HEMOGLOBIN: 13.3 G/DL (ref 11.9–15.1)
IMMATURE GRANULOCYTES: 1 %
LYMPHOCYTES # BLD: 31 % (ref 24–44)
MCH RBC QN AUTO: 31.1 PG (ref 25.2–33.5)
MCHC RBC AUTO-ENTMCNC: 31.7 G/DL (ref 28.4–34.8)
MCV RBC AUTO: 98.1 FL (ref 82.6–102.9)
MONOCYTES # BLD: 8 % (ref 1–7)
MORPHOLOGY: NORMAL
NRBC AUTOMATED: 0 PER 100 WBC
PDW BLD-RTO: 12.6 % (ref 11.8–14.4)
PLATELET # BLD: 235 K/UL (ref 138–453)
PMV BLD AUTO: 11.4 FL (ref 8.1–13.5)
POTASSIUM SERPL-SCNC: 4.1 MMOL/L (ref 3.7–5.3)
RBC # BLD: 4.27 M/UL (ref 3.95–5.11)
SEG NEUTROPHILS: 59 % (ref 36–66)
SEGMENTED NEUTROPHILS ABSOLUTE COUNT: 8.79 K/UL (ref 1.8–7.7)
SODIUM BLD-SCNC: 142 MMOL/L (ref 135–144)
WBC # BLD: 14.9 K/UL (ref 3.5–11.3)

## 2022-04-29 PROCEDURE — 94664 DEMO&/EVAL PT USE INHALER: CPT

## 2022-04-29 PROCEDURE — 94761 N-INVAS EAR/PLS OXIMETRY MLT: CPT

## 2022-04-29 PROCEDURE — 2700000000 HC OXYGEN THERAPY PER DAY

## 2022-04-29 PROCEDURE — 76937 US GUIDE VASCULAR ACCESS: CPT

## 2022-04-29 PROCEDURE — 85025 COMPLETE CBC W/AUTO DIFF WBC: CPT

## 2022-04-29 PROCEDURE — 6360000002 HC RX W HCPCS: Performed by: INTERNAL MEDICINE

## 2022-04-29 PROCEDURE — 80048 BASIC METABOLIC PNL TOTAL CA: CPT

## 2022-04-29 PROCEDURE — 36415 COLL VENOUS BLD VENIPUNCTURE: CPT

## 2022-04-29 PROCEDURE — 2500000003 HC RX 250 WO HCPCS: Performed by: INTERNAL MEDICINE

## 2022-04-29 PROCEDURE — 99233 SBSQ HOSP IP/OBS HIGH 50: CPT | Performed by: INTERNAL MEDICINE

## 2022-04-29 PROCEDURE — 2500000003 HC RX 250 WO HCPCS: Performed by: NURSE PRACTITIONER

## 2022-04-29 PROCEDURE — 2580000003 HC RX 258: Performed by: NURSE PRACTITIONER

## 2022-04-29 PROCEDURE — 6370000000 HC RX 637 (ALT 250 FOR IP): Performed by: INTERNAL MEDICINE

## 2022-04-29 PROCEDURE — 94640 AIRWAY INHALATION TREATMENT: CPT

## 2022-04-29 PROCEDURE — 94660 CPAP INITIATION&MGMT: CPT

## 2022-04-29 PROCEDURE — 6370000000 HC RX 637 (ALT 250 FOR IP): Performed by: NURSE PRACTITIONER

## 2022-04-29 PROCEDURE — 82947 ASSAY GLUCOSE BLOOD QUANT: CPT

## 2022-04-29 PROCEDURE — 2060000000 HC ICU INTERMEDIATE R&B

## 2022-04-29 RX ORDER — DEXTROSE MONOHYDRATE 100 MG/ML
INJECTION, SOLUTION INTRAVENOUS CONTINUOUS
Status: DISCONTINUED | OUTPATIENT
Start: 2022-04-29 | End: 2022-04-29

## 2022-04-29 RX ORDER — INSULIN GLARGINE 100 [IU]/ML
50 INJECTION, SOLUTION SUBCUTANEOUS 2 TIMES DAILY
Status: DISCONTINUED | OUTPATIENT
Start: 2022-04-29 | End: 2022-05-01 | Stop reason: HOSPADM

## 2022-04-29 RX ORDER — INSULIN GLARGINE 100 [IU]/ML
60 INJECTION, SOLUTION SUBCUTANEOUS 2 TIMES DAILY
Status: DISCONTINUED | OUTPATIENT
Start: 2022-04-29 | End: 2022-04-29

## 2022-04-29 RX ORDER — INSULIN GLARGINE 100 [IU]/ML
30 INJECTION, SOLUTION SUBCUTANEOUS ONCE
Status: COMPLETED | OUTPATIENT
Start: 2022-04-29 | End: 2022-04-29

## 2022-04-29 RX ADMIN — Medication 10 MG: at 22:19

## 2022-04-29 RX ADMIN — FERROUS SULFATE TAB EC 325 MG (65 MG FE EQUIVALENT) 325 MG: 325 (65 FE) TABLET DELAYED RESPONSE at 09:39

## 2022-04-29 RX ADMIN — INSULIN LISPRO 6 UNITS: 100 INJECTION, SOLUTION INTRAVENOUS; SUBCUTANEOUS at 22:00

## 2022-04-29 RX ADMIN — TOPIRAMATE 100 MG: 100 TABLET, FILM COATED ORAL at 22:00

## 2022-04-29 RX ADMIN — VITAM B12 50 MCG: 100 TAB at 09:38

## 2022-04-29 RX ADMIN — ASPIRIN 81 MG: 81 TABLET, COATED ORAL at 09:40

## 2022-04-29 RX ADMIN — FENOFIBRATE 160 MG: 160 TABLET ORAL at 09:39

## 2022-04-29 RX ADMIN — INSULIN GLARGINE 50 UNITS: 100 INJECTION, SOLUTION SUBCUTANEOUS at 22:00

## 2022-04-29 RX ADMIN — OXYBUTYNIN CHLORIDE 5 MG: 5 TABLET, EXTENDED RELEASE ORAL at 09:39

## 2022-04-29 RX ADMIN — GLUCAGON HYDROCHLORIDE 1 MG: KIT at 06:29

## 2022-04-29 RX ADMIN — DEXTROSE MONOHYDRATE 100 ML/HR: 50 INJECTION, SOLUTION INTRAVENOUS at 06:44

## 2022-04-29 RX ADMIN — PANTOPRAZOLE SODIUM 40 MG: 40 TABLET, DELAYED RELEASE ORAL at 09:39

## 2022-04-29 RX ADMIN — CITALOPRAM 20 MG: 20 TABLET, FILM COATED ORAL at 09:39

## 2022-04-29 RX ADMIN — INSULIN GLARGINE 30 UNITS: 100 INJECTION, SOLUTION SUBCUTANEOUS at 13:12

## 2022-04-29 RX ADMIN — INSULIN LISPRO 12 UNITS: 100 INJECTION, SOLUTION INTRAVENOUS; SUBCUTANEOUS at 17:28

## 2022-04-29 RX ADMIN — OMEGA-3-ACID ETHYL ESTERS 1 G: 1 CAPSULE, LIQUID FILLED ORAL at 09:39

## 2022-04-29 RX ADMIN — ISOSORBIDE DINITRATE 30 MG: 10 TABLET ORAL at 09:39

## 2022-04-29 RX ADMIN — ATORVASTATIN CALCIUM 40 MG: 80 TABLET, FILM COATED ORAL at 09:39

## 2022-04-29 RX ADMIN — MAGNESIUM GLUCONATE 500 MG ORAL TABLET 400 MG: 500 TABLET ORAL at 09:39

## 2022-04-29 RX ADMIN — CLOPIDOGREL 75 MG: 75 TABLET, FILM COATED ORAL at 09:39

## 2022-04-29 RX ADMIN — PREDNISONE 40 MG: 20 TABLET ORAL at 09:39

## 2022-04-29 RX ADMIN — INSULIN LISPRO 9 UNITS: 100 INJECTION, SOLUTION INTRAVENOUS; SUBCUTANEOUS at 13:13

## 2022-04-29 RX ADMIN — CEFTRIAXONE SODIUM 1000 MG: 1 INJECTION, POWDER, FOR SOLUTION INTRAMUSCULAR; INTRAVENOUS at 13:19

## 2022-04-29 RX ADMIN — PANTOPRAZOLE SODIUM 40 MG: 40 TABLET, DELAYED RELEASE ORAL at 22:00

## 2022-04-29 RX ADMIN — BUDESONIDE AND FORMOTEROL FUMARATE DIHYDRATE 2 PUFF: 160; 4.5 AEROSOL RESPIRATORY (INHALATION) at 08:15

## 2022-04-29 RX ADMIN — CARVEDILOL 3.12 MG: 3.12 TABLET, FILM COATED ORAL at 09:39

## 2022-04-29 RX ADMIN — Medication 1 TABLET: at 09:39

## 2022-04-29 RX ADMIN — OXYCODONE HYDROCHLORIDE AND ACETAMINOPHEN 2 TABLET: 5; 325 TABLET ORAL at 13:18

## 2022-04-29 RX ADMIN — CARVEDILOL 3.12 MG: 3.12 TABLET, FILM COATED ORAL at 17:37

## 2022-04-29 ASSESSMENT — PAIN SCALES - GENERAL
PAINLEVEL_OUTOF10: 7
PAINLEVEL_OUTOF10: 8

## 2022-04-29 ASSESSMENT — PAIN DESCRIPTION - LOCATION: LOCATION: CHEST

## 2022-04-29 ASSESSMENT — PAIN DESCRIPTION - ORIENTATION: ORIENTATION: LEFT

## 2022-04-29 NOTE — PROGRESS NOTES
04/29/22 0815   Treatment   Treatment Type MDI   Medications Budesonide/Formoterol  (mouth rinsed)     Inhaler Education        [x] Served spacer  [x] MDI Medication education: Symbicort  [x] Good return demonstration per patient     Jose Santiago, PETAR

## 2022-04-29 NOTE — PROGRESS NOTES
[facility]  Office: [address], DO, Alvin Kras, DO, Debbi Ng, DO, Toney Josh Blood, DO, Adeola Caldwell MD, Kenyatta Boo MD, Farzad Reyez MD, Chandana Lopez MD, Tristen Alcantara MD, Moises Pineda MD, Diya Salomon MD, Catarina Villalpando, DO, Charleen Medley, DO, Masood Moralez MD,  Leo Corral, DO, Swapnil Rizzo MD, Francesco Goldberg MD, Mary Kasper MD, Chelo Cline DO, Rosemarie Carrillo MD, Miah Pascual MD, Dion Saldaña, [facility], [facility], CNP, Yung Montero, CNP, Luis Armando Section, CNP, Brad Dudley, CNP, Ibeth Soldree, CNP, Daren Hankins PA-C, Lew Molina, DNP, Josselyn Husbands, CNP, Deyanira Martin, CNP, Reddy Boyer, CNP, Elin Feliciano, CNS, Orysia Soda, DNP, Starlett Market, CNP, Tomma Betters, CNP, Rosa Fitting, [facility]         [address]    Progress Note    4/29/2022    2:45 PM    Name:   Victorino Orellana  MRN:     [id number]     Acct:      [de-identified]   Room:   [address] [place] [address] Day:  4  Admit Date:  4/25/2022  1:46 PM    PCP:   Mayte Boles MD  Code Status:  Full Code    Subjective:     C/C:   Chief Complaint   Patient presents with    Shortness of Breath     Interval History Status: not changed. Patient seen examined this afternoon. This morning, patient profoundly hypoglycemic with glucose less than 20. She reports that she did need to 100% of her meal last night. We have increased her Lantus by 10 units over the last several days due to persistent hyperglycemia. She reports that she is anxious regarding this drop in her blood glucose. She reports that her blood glucose is typically well managed, especially after she was started on Tresiba. Reports that she is continue to have intermittent chest pain with deep breathing, but that this is improving and pain medications are helping. Remains on her typical 3 to 4 L of supplemental oxygen.     Brief History:     Ms. Kendrick Ag is a 66year old female who presented for evaluation of increased SOB over the last several days. She states she does have home oxygen for prn usage that she has been using continuously. She states she has has associated cough and chest pain that is worse when laying down. She denies any fever or chills. She does have a medical history that includes COPD, DM, HTN and CAD. She also complains of GERD and states she has been out of her home Prilosec. She states she has had a falling out with her PCP and is scheduled to see a new provider in early May. She is compliant with home medications. She is a former smoker (quit 2000), denies use of alcohol or recreational drugs.     -Hyperglycemia has been an issue, transferred to stepdown on 4/27 for insulin infusion.  -Insulin drip off 4/28. Re-initiation of SQ insulin.  -Hypoglycemic overnight on 4/29. Review of Systems:     Constitutional:  negative for chills, fevers, sweats  Respiratory: Reports stable shortness of breath; negative for cough  Cardiovascular: Reports stable, persistent left-sided chest wall pain. Negative for lower extremity edema, palpitations  Gastrointestinal:  negative for abdominal pain, constipation, diarrhea, nausea, vomiting  Neurological:  negative for dizziness, headache    Medications: Allergies: Allergies   Allergen Reactions    Robitussin [Guaifenesin] Anaphylaxis    Codeine Swelling    Compazine [Prochlorperazine Maleate] Hives and Swelling    Iodides Itching    Morphine Other (See Comments)     hallucinations    Moxifloxacin      Other reaction(s): Intolerance-unknown  Other reaction(s):  Intolerance-unknown    Reglan [Metoclopramide] Nausea Only    Avelox [Moxifloxacin Hcl In Nacl] Rash     Dermatitis      Cipro Xr Rash     dermatitis    Sulfa Antibiotics Rash       Current Meds:   Scheduled Meds:    [Held by provider] insulin glargine  60 Units SubCUTAneous BID    predniSONE  40 mg Oral Daily    cefTRIAXone (ROCEPHIN) IV  1,000 mg IntraVENous Q24H    insulin lispro  0-18 Units SubCUTAneous TID     insulin lispro  0-9 Units SubCUTAneous Nightly    aspirin  81 mg Oral Daily    atorvastatin  40 mg Oral Daily    carvedilol  3.125 mg Oral BID     citalopram  20 mg Oral Daily    clopidogrel  75 mg Oral Daily    fenofibrate  160 mg Oral Daily    ferrous sulfate  325 mg Oral Daily with breakfast    omega-3 acid ethyl esters  1 g Oral Daily    isosorbide dinitrate  30 mg Oral Daily    [Held by provider] losartan  25 mg Oral Daily    magnesium oxide  400 mg Oral Daily    therapeutic multivitamin-minerals  1 tablet Oral Daily    oxybutynin  5 mg Oral Daily    pantoprazole  40 mg Oral BID    budesonide-formoterol  2 puff Inhalation Daily    topiramate  100 mg Oral Nightly    vitamin B-12  50 mcg Oral Daily    sodium chloride flush  5-40 mL IntraVENous 2 times per day     Continuous Infusions:    dextrose      sodium chloride 75 mL/hr at 04/29/22 0616    dextrose 100 mL/hr (04/29/22 0644)    sodium chloride       PRN Meds: oxyCODONE-acetaminophen **OR** oxyCODONE-acetaminophen, calcium carbonate, cyclobenzaprine, albuterol, ipratropium-albuterol, docusate sodium, melatonin, glucose, dextrose, glucagon (rDNA), dextrose, sodium chloride flush, sodium chloride, ondansetron **OR** ondansetron    Data:     Past Medical History:   has a past medical history of Abdominal bloating, Adenomatous polyp of ascending colon, Allergic rhinitis, Anxiety, Arthritis, Asthma, Atrial fibrillation ([facility]), Back pain, Benign hypertension with CKD (chronic kidney disease) stage III ([facility]), CAD (coronary artery disease), Caffeine use, CHF (congestive heart failure) ([facility]), Chronic kidney disease, CKD (chronic kidney disease) stage 3, GFR 30-59 ml/min ([facility]), COPD (chronic obstructive pulmonary disease) ([facility] [address] Degeneration of lumbar or lumbosacral intervertebral disc, Depression, DM (diabetes mellitus) ([facility] [address] Emphysema of lung ([facility] [address] Gastritis, GERD (gastroesophageal reflux disease), Hearing loss, Hematuria, Hiatal hernia, HTN (hypertension), benign, Hypertriglyceridemia, Incontinence of urine, Irritable bowel syndrome with both constipation and diarrhea, Knee arthropathy, Lumbosacral spondylosis without myelopathy, Migraine headache, Mumps, Neuropathy, Obesity, On home oxygen therapy, HIGINIO on CPAP, Otitis media, Secondary diabetes mellitus with stage 3 chronic kidney disease (GFR 30-59) ([facility]), Stroke ([facility] [address] Tinnitus, Type 2 diabetes mellitus without complication ([facility] [address] Type II or unspecified type diabetes mellitus without mention of complication, not stated as uncontrolled, UTI (lower urinary tract infection), and Varicose vein. Social History:   reports that she quit smoking about 22 years ago. Her smoking use included cigarettes. She has a 123.00 pack-year smoking history. She has never used smokeless tobacco. She reports that she does not drink alcohol and does not use drugs. Family History:   Family History   Problem Relation Age of Onset    Diabetes Mother     Heart Disease Mother     Stomach Cancer Father     Diabetes Maternal Grandmother         also aunts and uncles (maternal)    Emphysema Sister        Vitals:  /83   Pulse 71   Temp 97.7 °F (36.5 °C) (Temporal)   Resp 21   Ht 5' 2\" (1.575 m)   Wt 234 lb (106.1 kg)   LMP  (LMP Unknown)   SpO2 94%   BMI 42.80 kg/m²   Temp (24hrs), Av.1 °F (36.2 °C), Min:95.6 °F (35.3 °C), Max:97.8 °F (36.6 °C)    Recent Labs     22  0657 22  0710 22  0951 22  1203   POCGLU 63* 105 300* 278*       I/O (24Hr):     Intake/Output Summary (Last 24 hours) at 2022 1445  Last data filed at 2022 1327  Gross per 24 hour   Intake 1950 ml   Output 3050 ml   Net -1100 ml       Labs:  Hematology:  Recent Labs     22  0343 22  0540 22  0541   WBC 16.7* 13.2* 14.9*   RBC 4.09 4.02 4.27   HGB 12.6 12.5 13.3   HCT 38.1 38.0 41.9   MCV 93.2 94.5 98.1   MCH 30.8 31.1 31.1   MCHC 33.1 32.9 31.7   RDW 12.5 12.5 12.6    189 235   MPV 11.7 11.9 11.4     Chemistry:  Recent Labs     04/27/22 2152 04/28/22  0540 04/29/22  0541   * 134* 142   K 4.8 4.1 4.1   CL 98 99 104   CO2 26 28 28   GLUCOSE 190* 95 33*   BUN 52* 48* 42*   CREATININE 1.72* 1.60* 1.51*   ANIONGAP 8* 7* 10   LABGLOM 29* 32* 34*   GFRAA 35* 38* 41*   CALCIUM 8.7 8.9 8.8     Recent Labs     04/28/22  0540 04/28/22  0612 04/29/22  0640 04/29/22  0650 04/29/22  0657 04/29/22  0710 04/29/22  0951 04/29/22  1203   PROT 6.1*  --   --   --   --   --   --   --    LABALBU 3.6  --   --   --   --   --   --   --    AST 18  --   --   --   --   --   --   --    ALT 26  --   --   --   --   --   --   --    ALKPHOS 50  --   --   --   --   --   --   --    BILITOT 0.20*  --   --   --   --   --   --   --    BILIDIR <0.08  --   --   --   --   --   --   --    POCGLU  --    < > 31* 59* 63* 105 300* 278*    < > = values in this interval not displayed. ABG:  Lab Results   Component Value Date    POCPH 7.239 10/06/2021    PH 7.346 11/21/2011    POCPCO2 77.6 10/06/2021    PCO2 49.8 11/21/2011    POCPO2 137.2 10/06/2021    PO2ART 61.3 11/21/2011    POCHCO3 33.1 10/06/2021    EFZ2ECC 26.5 11/21/2011    NBEA NOT REPORTED 10/06/2021    PBEA 3 10/06/2021    MJW1RYT NOT REPORTED 10/06/2021    LTEB3JTN 98 10/06/2021    K5GQVPRW 89.7 11/21/2011    FIO2 UNKNOWN 04/25/2022     Lab Results   Component Value Date/Time    SPECIAL NOT REPORTED 12/31/2021 12:36 PM     Lab Results   Component Value Date/Time    CULTURE ENTEROCOCCUS FAECALIS 10 to 50,000 CFU/ML (A) 12/31/2021 12:36 PM    CULTURE PRESUMPTIVE CANDIDA ALBICANS 10 to 50,000 CFU/ML (A) 12/31/2021 12:36 PM       Radiology:  XR CHEST PORTABLE    Result Date: 4/25/2022  No acute process. Physical Examination:        General appearance:  alert, cooperative and no distress, morbidly obese  female, sitting up in bed.   Mental Status:  oriented to person, place and time and normal affect  HEENT: Supple cannula present, EOMI, sclera anti-icteric, large neck circumference  Lungs:  clear to auscultation bilaterally, very faint end expiratory wheezing heard occasionally in the right upper and right lower lung field, no rhonchi, normal effort  Heart:  regular rate and rhythm, no murmur  Abdomen:  soft, nontender, nondistended, normal bowel sounds, protuberant  Extremities:  no edema, redness, tenderness in the calves  Skin:  no gross lesions, rashes, induration    Assessment:        Hospital Problems           Last Modified POA    * (Principal) COPD exacerbation ([facility] [address] 4/25/2022 Yes    Type 2 diabetes mellitus with diabetic polyneuropathy, with long-term current use of insulin (HCC) (Chronic) 4/25/2022 Yes    YAJAIRA (acute kidney injury) ([facility] [address] 4/27/2022 No    Asthma with COPD ([facility] [address] 4/26/2022 Yes    GERD (gastroesophageal reflux disease) (Chronic) 4/26/2022 Yes    Primary hypertension 4/26/2022 Yes    Mixed hyperlipidemia (Chronic) 4/26/2022 Yes    Overview Signed 9/7/2015  4:50 AM by Tab [place]     replace inactive diagnosis         Obesity, Class III, BMI 40-49.9 (morbid obesity) ([facility] [address] 4/26/2022 Yes    Stage 3 chronic kidney disease ([facility] [address] 4/26/2022 Yes    Benign hypertension with CKD (chronic kidney disease) stage III ([address] 4/26/2022 Yes    Neutrophilic leukocytosis 8/90/6665 No          Plan:        Acute exacerbation of COPD-Continue prednisone 40 mg qd at present. Will taper at discharge. Completed Rocephin/azithromycin. Continue updrafts. Albuterol as needed. YAJAIRA - slowly improving today. Uncertain as to the etiology. Maintain off all non-necessary nephrotoxic agents. Hold losartan at present. Check bladder scans for PVRs. Noncardiac chest pain - increased percocet to moderate/severe scale. Prn flexeril  Type 2 diabetes mellitus with hyporglycemia- on insulin infusion yesterday, transition to her typical basal/bolus dosing. Hypoglycemic overnight.   Now hyperglycemic throughout the day today. Give 30 units of Lantus now. Resume her 50 units twice daily tonight. If glucoses are improved tomorrow, hopeful for discharge. Transaminitis - resolved. Morbid obesity with BMI of 42.80- diet/weight loss/exercise recommended  Leukocytosis-likely secondary to steroid administration. Dispo: 24 more hours. YAJAIRA slowly resolving. Working on stable glycemic control.     33 Agnes Stoddard DO  4/29/2022  2:45 PM

## 2022-04-29 NOTE — CARE COORDINATION
DAKOTAH ZIMMERMAN [facility] Quality Flow/Interdisciplinary Rounds Progress Note    Quality Flow Rounds held on April 29, 2022 at [address] Attending:  Bedside Nurse, ,  and Nursing Unit Leadership    Barriers to Discharge:     Anticipated Discharge Date:       Anticipated Discharge Disposition:    Readmission Risk              Risk of Unplanned Readmission:  27           Discussed patient goal for the day, patient clinical progression, and barriers to discharge. The following Goal(s) of the Day/Commitment(s) have been identified:      Monitoring blood sugars, anticipate discharge today or tomorrow. DC plan home, no needs except cab.       Jeancarlos Hallman RN  April 29, 2022

## 2022-04-29 NOTE — PROGRESS NOTES
2088 lab reported AM lab resulted glucose 33, re checked and it was 21, pt A&Ox4 \"just woozy\" per pt. 1mg IM glucagon given since [employer] was not stocked with IV or PO dextrose. 0636  check was 18, D5W @100mL/hr started. Pt drank 3 OJs and ate some crackers,   0650  check at 0650 up to 61.   0657 check at  61.  Continuing to monitor  0708 check now at 105

## 2022-04-29 NOTE — PLAN OF CARE
PROVIDE ADEQUATE OXYGENATION WITH ACCEPTABLE SP02/ABG'S     [x]         IDENTIFY APPROPRIATE OXYGEN THERAPY  [x]         MONITOR SP02/ABG'S AS NEEDED   [x]         PATIENT EDUCATION AS NEEDED     NON INVASIVE VENTILATION  PROVIDE OPTIMAL VENTILATION/ACCEPTABLE SP02  IMPLEMENT NON INVASIVE VENTILATION PROTOCOL  ASSESSMENT SKIN INTEGRITY  PATIENT EDUCATION AS NEEDED  CPAP AS NEEDED

## 2022-04-30 LAB
ABSOLUTE EOS #: 0.04 K/UL (ref 0–0.44)
ABSOLUTE IMMATURE GRANULOCYTE: 0.24 K/UL (ref 0–0.3)
ABSOLUTE LYMPH #: 2.56 K/UL (ref 1.1–3.7)
ABSOLUTE MONO #: 0.82 K/UL (ref 0.1–1.2)
ANION GAP SERPL CALCULATED.3IONS-SCNC: 8 MMOL/L (ref 9–17)
BASOPHILS # BLD: 0 % (ref 0–2)
BASOPHILS ABSOLUTE: 0.03 K/UL (ref 0–0.2)
BUN BLDV-MCNC: 34 MG/DL (ref 8–23)
CALCIUM SERPL-MCNC: 8.6 MG/DL (ref 8.6–10.4)
CHLORIDE BLD-SCNC: 101 MMOL/L (ref 98–107)
CO2: 28 MMOL/L (ref 20–31)
CREAT SERPL-MCNC: 1.3 MG/DL (ref 0.5–0.9)
EOSINOPHILS RELATIVE PERCENT: 0 % (ref 1–4)
GFR AFRICAN AMERICAN: 49 ML/MIN
GFR NON-AFRICAN AMERICAN: 40 ML/MIN
GFR SERPL CREATININE-BSD FRML MDRD: ABNORMAL ML/MIN/{1.73_M2}
GLUCOSE BLD-MCNC: 126 MG/DL (ref 65–105)
GLUCOSE BLD-MCNC: 150 MG/DL (ref 65–105)
GLUCOSE BLD-MCNC: 167 MG/DL (ref 70–99)
GLUCOSE BLD-MCNC: 238 MG/DL (ref 65–105)
GLUCOSE BLD-MCNC: 314 MG/DL (ref 65–105)
GLUCOSE BLD-MCNC: 344 MG/DL (ref 65–105)
GLUCOSE BLD-MCNC: 95 MG/DL (ref 65–105)
HCT VFR BLD CALC: 36.6 % (ref 36.3–47.1)
HEMOGLOBIN: 11.8 G/DL (ref 11.9–15.1)
IMMATURE GRANULOCYTES: 3 %
LYMPHOCYTES # BLD: 27 % (ref 24–43)
MCH RBC QN AUTO: 31 PG (ref 25.2–33.5)
MCHC RBC AUTO-ENTMCNC: 32.2 G/DL (ref 28.4–34.8)
MCV RBC AUTO: 96.1 FL (ref 82.6–102.9)
MONOCYTES # BLD: 9 % (ref 3–12)
NRBC AUTOMATED: 0 PER 100 WBC
PDW BLD-RTO: 12.6 % (ref 11.8–14.4)
PLATELET # BLD: 152 K/UL (ref 138–453)
PMV BLD AUTO: 12.3 FL (ref 8.1–13.5)
POTASSIUM SERPL-SCNC: 4.6 MMOL/L (ref 3.7–5.3)
RBC # BLD: 3.81 M/UL (ref 3.95–5.11)
SEG NEUTROPHILS: 62 % (ref 36–65)
SEGMENTED NEUTROPHILS ABSOLUTE COUNT: 5.94 K/UL (ref 1.5–8.1)
SODIUM BLD-SCNC: 137 MMOL/L (ref 135–144)
TROPONIN, HIGH SENSITIVITY: 8 NG/L (ref 0–14)
WBC # BLD: 9.6 K/UL (ref 3.5–11.3)

## 2022-04-30 PROCEDURE — 85025 COMPLETE CBC W/AUTO DIFF WBC: CPT

## 2022-04-30 PROCEDURE — 2060000000 HC ICU INTERMEDIATE R&B

## 2022-04-30 PROCEDURE — 94761 N-INVAS EAR/PLS OXIMETRY MLT: CPT

## 2022-04-30 PROCEDURE — 2500000003 HC RX 250 WO HCPCS: Performed by: INTERNAL MEDICINE

## 2022-04-30 PROCEDURE — 6370000000 HC RX 637 (ALT 250 FOR IP): Performed by: NURSE PRACTITIONER

## 2022-04-30 PROCEDURE — 2700000000 HC OXYGEN THERAPY PER DAY

## 2022-04-30 PROCEDURE — 6360000002 HC RX W HCPCS: Performed by: INTERNAL MEDICINE

## 2022-04-30 PROCEDURE — 84484 ASSAY OF TROPONIN QUANT: CPT

## 2022-04-30 PROCEDURE — 36415 COLL VENOUS BLD VENIPUNCTURE: CPT

## 2022-04-30 PROCEDURE — 82947 ASSAY GLUCOSE BLOOD QUANT: CPT

## 2022-04-30 PROCEDURE — 80048 BASIC METABOLIC PNL TOTAL CA: CPT

## 2022-04-30 PROCEDURE — 94660 CPAP INITIATION&MGMT: CPT

## 2022-04-30 PROCEDURE — 2580000003 HC RX 258: Performed by: NURSE PRACTITIONER

## 2022-04-30 PROCEDURE — 6370000000 HC RX 637 (ALT 250 FOR IP): Performed by: INTERNAL MEDICINE

## 2022-04-30 PROCEDURE — 94640 AIRWAY INHALATION TREATMENT: CPT

## 2022-04-30 PROCEDURE — 99232 SBSQ HOSP IP/OBS MODERATE 35: CPT | Performed by: INTERNAL MEDICINE

## 2022-04-30 PROCEDURE — 2580000003 HC RX 258: Performed by: INTERNAL MEDICINE

## 2022-04-30 RX ORDER — RANOLAZINE 500 MG/1
500 TABLET, EXTENDED RELEASE ORAL 2 TIMES DAILY
Status: DISCONTINUED | OUTPATIENT
Start: 2022-04-30 | End: 2022-05-01 | Stop reason: HOSPADM

## 2022-04-30 RX ADMIN — PANTOPRAZOLE SODIUM 40 MG: 40 TABLET, DELAYED RELEASE ORAL at 09:41

## 2022-04-30 RX ADMIN — ISOSORBIDE DINITRATE 30 MG: 10 TABLET ORAL at 09:41

## 2022-04-30 RX ADMIN — OXYBUTYNIN CHLORIDE 5 MG: 5 TABLET, EXTENDED RELEASE ORAL at 09:41

## 2022-04-30 RX ADMIN — OXYCODONE HYDROCHLORIDE AND ACETAMINOPHEN 2 TABLET: 5; 325 TABLET ORAL at 03:40

## 2022-04-30 RX ADMIN — CITALOPRAM 20 MG: 20 TABLET, FILM COATED ORAL at 09:41

## 2022-04-30 RX ADMIN — CLOPIDOGREL 75 MG: 75 TABLET, FILM COATED ORAL at 09:41

## 2022-04-30 RX ADMIN — SODIUM CHLORIDE, PRESERVATIVE FREE 10 ML: 5 INJECTION INTRAVENOUS at 09:42

## 2022-04-30 RX ADMIN — Medication 10 MG: at 20:19

## 2022-04-30 RX ADMIN — ASPIRIN 81 MG: 81 TABLET, COATED ORAL at 09:41

## 2022-04-30 RX ADMIN — OMEGA-3-ACID ETHYL ESTERS 1 G: 1 CAPSULE, LIQUID FILLED ORAL at 09:42

## 2022-04-30 RX ADMIN — PANTOPRAZOLE SODIUM 40 MG: 40 TABLET, DELAYED RELEASE ORAL at 20:15

## 2022-04-30 RX ADMIN — CARVEDILOL 3.12 MG: 3.12 TABLET, FILM COATED ORAL at 17:05

## 2022-04-30 RX ADMIN — SODIUM CHLORIDE: 4.5 INJECTION, SOLUTION INTRAVENOUS at 09:54

## 2022-04-30 RX ADMIN — INSULIN LISPRO 12 UNITS: 100 INJECTION, SOLUTION INTRAVENOUS; SUBCUTANEOUS at 17:05

## 2022-04-30 RX ADMIN — FERROUS SULFATE TAB EC 325 MG (65 MG FE EQUIVALENT) 325 MG: 325 (65 FE) TABLET DELAYED RESPONSE at 09:41

## 2022-04-30 RX ADMIN — TOPIRAMATE 100 MG: 100 TABLET, FILM COATED ORAL at 20:15

## 2022-04-30 RX ADMIN — INSULIN GLARGINE 50 UNITS: 100 INJECTION, SOLUTION SUBCUTANEOUS at 09:52

## 2022-04-30 RX ADMIN — CARVEDILOL 3.12 MG: 3.12 TABLET, FILM COATED ORAL at 09:41

## 2022-04-30 RX ADMIN — OXYCODONE HYDROCHLORIDE AND ACETAMINOPHEN 2 TABLET: 5; 325 TABLET ORAL at 14:46

## 2022-04-30 RX ADMIN — Medication 1 TABLET: at 09:41

## 2022-04-30 RX ADMIN — PREDNISONE 40 MG: 20 TABLET ORAL at 09:41

## 2022-04-30 RX ADMIN — ATORVASTATIN CALCIUM 40 MG: 80 TABLET, FILM COATED ORAL at 09:41

## 2022-04-30 RX ADMIN — FENOFIBRATE 160 MG: 160 TABLET ORAL at 09:41

## 2022-04-30 RX ADMIN — RANOLAZINE 500 MG: 500 TABLET, FILM COATED, EXTENDED RELEASE ORAL at 17:05

## 2022-04-30 RX ADMIN — BUDESONIDE AND FORMOTEROL FUMARATE DIHYDRATE 2 PUFF: 160; 4.5 AEROSOL RESPIRATORY (INHALATION) at 09:25

## 2022-04-30 RX ADMIN — VITAM B12 50 MCG: 100 TAB at 09:41

## 2022-04-30 RX ADMIN — INSULIN LISPRO 6 UNITS: 100 INJECTION, SOLUTION INTRAVENOUS; SUBCUTANEOUS at 20:14

## 2022-04-30 RX ADMIN — INSULIN GLARGINE 50 UNITS: 100 INJECTION, SOLUTION SUBCUTANEOUS at 20:14

## 2022-04-30 RX ADMIN — CEFTRIAXONE SODIUM 1000 MG: 1 INJECTION, POWDER, FOR SOLUTION INTRAMUSCULAR; INTRAVENOUS at 09:51

## 2022-04-30 ASSESSMENT — PAIN SCALES - GENERAL
PAINLEVEL_OUTOF10: 7

## 2022-04-30 ASSESSMENT — PAIN DESCRIPTION - LOCATION: LOCATION: HIP

## 2022-04-30 ASSESSMENT — PAIN DESCRIPTION - ORIENTATION: ORIENTATION: RIGHT

## 2022-04-30 NOTE — PROGRESS NOTES
[facility]  Office: [address], DO, Patricia Baumann, DO, Amy Martines, DO, Anitha Mario Blood, DO, Teodora Alarcon MD, Zaki Deleon MD, Lisa Boogie MD, Yu Hanks MD, Tiarra Shahid MD, Avelino Clement MD, Carla Ford MD, Sujatha Maldonado, DO, Terrell Holliday, DO, Juanita Dotson MD,  Evelyne Chang, DO, Neri Antonio MD, Eitan Meier MD, Tristin Fuentes MD, Navya Mccauley, DO, Harper Walker MD, Abigail Santillan MD, Mable Esteban, [facility], [facility] CenterLAZARO García, CNP, Aliyah Medley CNP, Alanna Ocasio CNP, Ruben Sellers CNP, Gamaliel Fu CNP, Loc Jenkins PAKrystleC, Curtis Aldrich, DNP, Ambrocio Charles, CNP, Dimitri Alegria, CNP, Ambar Adair, CNP, Sandi Richmond, CNS, Eli Goss DNP, Craig Jacinto, CNP, Jamari Briceno, CNP, Rona Almanzar, [address] [place] [address]    Progress Note    4/30/2022    6:06 PM    Name:   Jourdan Lezama  MRN:     [id number]     Acct:      [de-identified]   Room:   [facility] Day:  5  Admit Date:  4/25/2022  1:46 PM    PCP:   Ronaldo Ocasio MD  Code Status:  Full Code    Subjective:     C/C:   Chief Complaint   Patient presents with    Shortness of Breath     Interval History Status: not changed. Patient seen examined this afternoon. Resting in bed, sleeping upon my arrival.  Patient reports that she continues to have intermittent left-sided chest pain. States that she is afraid to go home. States that she will come back to the emergency department if she is discharged home today, she does not feel like her chest pain is adequately controlled. Vitals are stable. Glycemic control is improved. Not wheezing on exam.  Remains on her typical 3 to 4 L of supplemental oxygen. Brief History:     Ms. Tharon Scheuermann is a 66year old female who presented for evaluation of increased SOB over the last several days. She states she does have home oxygen for prn usage that she has been using continuously. She states she has has associated cough and chest pain that is worse when laying down. She denies any fever or chills. She does have a medical history that includes COPD, DM, HTN and CAD. She also complains of GERD and states she has been out of her home Prilosec. She states she has had a falling out with her PCP and is scheduled to see a new provider in early May. She is compliant with home medications. She is a former smoker (quit 2000), denies use of alcohol or recreational drugs.     -Hyperglycemia has been an issue, [facility].  -Insulin drip off 4/28. Re-initiation of SQ insulin.  -Hypoglycemic overnight on 4/29.  -Not wanting to go home. Still having chest pain. Had nuclear exam 1 month ago. No evidence of ischemia. Saw her cardiologist 1 month ago as well. Review of Systems:     Constitutional:  negative for chills, fevers, sweats  Respiratory: Reports stable shortness of breath; negative for cough  Cardiovascular: Reports stable, persistent left-sided chest wall pain, which is reproducible. Negative for lower extremity edema, palpitations  Gastrointestinal:  negative for abdominal pain, constipation, diarrhea, nausea, vomiting  Neurological:  negative for dizziness, headache    Medications: Allergies: Allergies   Allergen Reactions    Robitussin [Guaifenesin] Anaphylaxis    Codeine Swelling    Compazine [Prochlorperazine Maleate] Hives and Swelling    Iodides Itching    Morphine Other (See Comments)     hallucinations    Moxifloxacin      Other reaction(s): Intolerance-unknown  Other reaction(s):  Intolerance-unknown    Reglan [Metoclopramide] Nausea Only    Avelox [Moxifloxacin Hcl In Nacl] Rash     Dermatitis      Cipro Xr Rash     dermatitis    Sulfa Antibiotics Rash       Current Meds:   Scheduled Meds:    ranolazine  500 mg Oral BID    insulin glargine  50 Units SubCUTAneous BID    predniSONE  40 mg Oral Daily    insulin lispro  0-18 Units SubCUTAneous TID     insulin lispro  0-9 Units SubCUTAneous Nightly    aspirin  81 mg Oral Daily    atorvastatin  40 mg Oral Daily    carvedilol  3.125 mg Oral BID     citalopram  20 mg Oral Daily    clopidogrel  75 mg Oral Daily    fenofibrate  160 mg Oral Daily    ferrous sulfate  325 mg Oral Daily with breakfast    omega-3 acid ethyl esters  1 g Oral Daily    isosorbide dinitrate  30 mg Oral Daily    [Held by provider] losartan  25 mg Oral Daily    magnesium oxide  400 mg Oral Daily    therapeutic multivitamin-minerals  1 tablet Oral Daily    oxybutynin  5 mg Oral Daily    pantoprazole  40 mg Oral BID    budesonide-formoterol  2 puff Inhalation Daily    topiramate  100 mg Oral Nightly    vitamin B-12  50 mcg Oral Daily    sodium chloride flush  5-40 mL IntraVENous 2 times per day     Continuous Infusions:    sodium chloride 75 mL/hr at 04/30/22 1730    dextrose 100 mL/hr (04/29/22 0644)    sodium chloride       PRN Meds: oxyCODONE-acetaminophen **OR** oxyCODONE-acetaminophen, calcium carbonate, cyclobenzaprine, albuterol, ipratropium-albuterol, docusate sodium, melatonin, glucose, dextrose, glucagon (rDNA), dextrose, sodium chloride flush, sodium chloride, ondansetron **OR** ondansetron    Data:     Past Medical History:   has a past medical history of Abdominal bloating, Adenomatous polyp of ascending colon, Allergic rhinitis, Anxiety, Arthritis, Asthma, Atrial fibrillation ([facility]), Back pain, Benign hypertension with CKD (chronic kidney disease) stage III ([facility]), CAD (coronary artery disease), Caffeine use, CHF (congestive heart failure) ([facility]), Chronic kidney disease, CKD (chronic kidney disease) stage 3, GFR 30-59 ml/min ([facility]), COPD (chronic obstructive pulmonary disease) ([facility]), Degeneration of lumbar or lumbosacral intervertebral disc, Depression, DM (diabetes mellitus) ([facility] Emphysema of lung ([facility] [address] Gastritis, GERD (gastroesophageal reflux disease), Hearing loss, Hematuria, Hiatal hernia, HTN (hypertension), benign, Hypertriglyceridemia, Incontinence of urine, Irritable bowel syndrome with both constipation and diarrhea, Knee arthropathy, Lumbosacral spondylosis without myelopathy, Migraine headache, Mumps, Neuropathy, Obesity, On home oxygen therapy, HIGINIO on CPAP, Otitis media, Secondary diabetes mellitus with stage 3 chronic kidney disease (GFR 30-59) ([facility]), Stroke ([facility] [address] Tinnitus, Type 2 diabetes mellitus without complication ([facility] [address] Type II or unspecified type diabetes mellitus without mention of complication, not stated as uncontrolled, UTI (lower urinary tract infection), and Varicose vein. Social History:   reports that she quit smoking about 22 years ago. Her smoking use included cigarettes. She has a 123.00 pack-year smoking history. She has never used smokeless tobacco. She reports that she does not drink alcohol and does not use drugs. Family History:   Family History   Problem Relation Age of Onset    Diabetes Mother     Heart Disease Mother     Stomach Cancer Father     Diabetes Maternal Grandmother         also aunts and uncles (maternal)    Emphysema Sister        Vitals:  /65   Pulse 69   Temp 98.4 °F (36.9 °C) (Oral)   Resp 18   Ht 5' 2\" (1.575 m)   Wt 234 lb (106.1 kg)   LMP  (LMP Unknown)   SpO2 92%   BMI 42.80 kg/m²   Temp (24hrs), Av.2 °F (36.8 °C), Min:97.9 °F (36.6 °C), Max:98.5 °F (36.9 °C)    Recent Labs     22  0527 22  0730 22  1152 22  1717   POCGLU 150* 95 126* 314*       I/O (24Hr):     Intake/Output Summary (Last 24 hours) at 2022 1806  Last data filed at 2022 1730  Gross per 24 hour   Intake 2716 ml   Output 1400 ml   Net 1316 ml       Labs:  Hematology:  Recent Labs     22  0540 22  0541 22  0528   WBC 13.2* 14.9* 9.6   RBC 4.02 4.27 3.81*   HGB 12.5 13.3 11.8*   HCT 38.0 41.9 36.6   MCV 94.5 98.1 96.1   MCH 31.1 31.1 31.0   MCHC 32.9 31.7 32.2   RDW 12.5 12.6 12.6    235 152   MPV 11.9 11.4 12.3     Chemistry:  Recent Labs     04/28/22  0540 04/29/22  0541 04/30/22  0528   * 142 137   K 4.1 4.1 4.6   CL 99 104 101   CO2 28 28 28   GLUCOSE 95 33* 167*   BUN 48* 42* 34*   CREATININE 1.60* 1.51* 1.30*   ANIONGAP 7* 10 8*   LABGLOM 32* 34* 40*   GFRAA 38* 41* 49*   CALCIUM 8.9 8.8 8.6     Recent Labs     04/28/22  0540 04/28/22  0612 04/29/22  2319 04/30/22  0324 04/30/22  0527 04/30/22  0730 04/30/22  1152 04/30/22  1717   PROT 6.1*  --   --   --   --   --   --   --    LABALBU 3.6  --   --   --   --   --   --   --    AST 18  --   --   --   --   --   --   --    ALT 26  --   --   --   --   --   --   --    ALKPHOS 50  --   --   --   --   --   --   --    BILITOT 0.20*  --   --   --   --   --   --   --    BILIDIR <0.08  --   --   --   --   --   --   --    POCGLU  --    < > 301* 238* 150* 95 126* 314*    < > = values in this interval not displayed. ABG:  Lab Results   Component Value Date    POCPH 7.239 10/06/2021    PH 7.346 11/21/2011    POCPCO2 77.6 10/06/2021    PCO2 49.8 11/21/2011    POCPO2 137.2 10/06/2021    PO2ART 61.3 11/21/2011    POCHCO3 33.1 10/06/2021    BON4GQG 26.5 11/21/2011    NBEA NOT REPORTED 10/06/2021    PBEA 3 10/06/2021    SQI0BQS NOT REPORTED 10/06/2021    HTZG3GOE 98 10/06/2021    P1JMLDDJ 89.7 11/21/2011    FIO2 UNKNOWN 04/25/2022     Lab Results   Component Value Date/Time    SPECIAL NOT REPORTED 12/31/2021 12:36 PM     Lab Results   Component Value Date/Time    CULTURE ENTEROCOCCUS FAECALIS 10 to 50,000 CFU/ML (A) 12/31/2021 12:36 PM    CULTURE PRESUMPTIVE CANDIDA ALBICANS 10 to 50,000 CFU/ML (A) 12/31/2021 12:36 PM       Radiology:  XR CHEST PORTABLE    Result Date: 4/25/2022  No acute process. Physical Examination:        General appearance:  alert, cooperative and no distress, morbidly obese  female, sitting up in bed.   Mental Status:  oriented to person, place and time and normal affect  HEENT: Supple cannula present, EOMI, sclera anti-icteric, large neck circumference  Lungs:  clear to auscultation bilaterally, very faint end expiratory wheezing heard occasionally in the right upper and right lower lung field, no rhonchi, normal effort  Heart:  regular rate and rhythm, no murmur, chest pain with palpation of left anterior chest wall  Abdomen:  soft, nontender, nondistended, normal bowel sounds, protuberant  Extremities:  no edema, redness, tenderness in the calves  Skin:  no gross lesions, rashes, induration    Assessment:        Hospital Problems           Last Modified POA    * (Principal) COPD exacerbation ([facility] [address] 4/25/2022 Yes    Type 2 diabetes mellitus with diabetic polyneuropathy, with long-term current use of insulin (HCC) (Chronic) 4/25/2022 Yes    YAJAIRA (acute kidney injury) ([facility] [address] 4/27/2022 No    Asthma with COPD ([facility] [address] 4/26/2022 Yes    GERD (gastroesophageal reflux disease) (Chronic) 4/26/2022 Yes    Primary hypertension 4/26/2022 Yes    Mixed hyperlipidemia (Chronic) 4/26/2022 Yes    Overview Signed 9/7/2015  4:50 AM by [address] [facility]     replace inactive diagnosis         Obesity, Class III, BMI 40-49.9 (morbid obesity) ([facility] 4/26/2022 Yes    Stage 3 chronic kidney disease ([facility] [address] 4/26/2022 Yes    Benign hypertension with CKD (chronic kidney disease) stage III ([facility] [address] 4/26/2022 Yes    Neutrophilic leukocytosis 9/16/6269 No          Plan:        Acute exacerbation of COPD-Continue prednisone 40 mg qd at present. Will taper at discharge. Completed Rocephin/azithromycin. Continue updrafts. Albuterol as needed. YAJAIRA - slowly improving today. Uncertain as to the etiology. Maintain off all non-necessary nephrotoxic agents. Hold losartan at present. Check bladder scans for PVRs. Noncardiac chest pain - increased percocet to moderate/severe scale. Prn flexeril. Adding Ranexa as she does have known coronary artery disease of D1 which was discovered on left heart cath 10 years ago. May need cardiology consult. Change Flexeril to Robaxin. Check troponin now. Type 2 diabetes mellitus with hyporglycemia-hypoglycemia has improved. Continue current regimen of insulin. Transaminitis - resolved. Morbid obesity with BMI of 42.80- diet/weight loss/exercise recommended  Leukocytosis-likely secondary to steroid administration. Dispo: Patient is afraid to go home. States that she will return to this chest pain which has been stable for several days. Multiple discussions were had with the patient regarding the chest pain. Old records were reviewed. I do not believe that this is cardiac in nature. Adjusting muscle relaxants today. Checking troponin. Hopeful for discharge tomorrow.     33 Agnes Stoddard DO  4/30/2022  6:06 PM

## 2022-04-30 NOTE — PLAN OF CARE
Problem: Pain  Goal: Verbalizes/displays adequate comfort level or baseline comfort level  Outcome: Progressing     Problem: Chronic Conditions and Co-morbidities  Goal: Patient's chronic conditions and co-morbidity symptoms are monitored and maintained or improved  Outcome: Progressing     Problem: Discharge Planning  Goal: Discharge to home or other facility with appropriate resources  Outcome: Progressing

## 2022-04-30 NOTE — PLAN OF CARE
Problem: Pain  Goal: Verbalizes/displays adequate comfort level or baseline comfort level  4/30/2022 1056 by Hannah Escamilla RN  Outcome: Progressing  4/30/2022 0048 by Diane Santo RN  Outcome: Progressing     Problem: Safety - Adult  Goal: Free from fall injury  4/30/2022 1056 by Hannah Escamilla RN  Outcome: Progressing  4/30/2022 0048 by Diane Santo RN  Outcome: Adequate for Discharge

## 2022-05-01 VITALS
WEIGHT: 234 LBS | DIASTOLIC BLOOD PRESSURE: 53 MMHG | TEMPERATURE: 98.2 F | SYSTOLIC BLOOD PRESSURE: 121 MMHG | BODY MASS INDEX: 43.06 KG/M2 | RESPIRATION RATE: 21 BRPM | HEART RATE: 72 BPM | OXYGEN SATURATION: 94 % | HEIGHT: 62 IN

## 2022-05-01 PROBLEM — N17.9 AKI (ACUTE KIDNEY INJURY) (HCC): Status: RESOLVED | Noted: 2018-02-01 | Resolved: 2022-05-01

## 2022-05-01 PROBLEM — D72.828 NEUTROPHILIC LEUKOCYTOSIS: Status: RESOLVED | Noted: 2022-04-27 | Resolved: 2022-05-01

## 2022-05-01 PROBLEM — J44.1 COPD EXACERBATION (HCC): Status: RESOLVED | Noted: 2022-04-25 | Resolved: 2022-05-01

## 2022-05-01 PROBLEM — D72.9 NEUTROPHILIC LEUKOCYTOSIS: Status: RESOLVED | Noted: 2022-04-27 | Resolved: 2022-05-01

## 2022-05-01 LAB
ABSOLUTE EOS #: 0.04 K/UL (ref 0–0.44)
ABSOLUTE IMMATURE GRANULOCYTE: 0.36 K/UL (ref 0–0.3)
ABSOLUTE LYMPH #: 2.35 K/UL (ref 1.1–3.7)
ABSOLUTE MONO #: 0.72 K/UL (ref 0.1–1.2)
ANION GAP SERPL CALCULATED.3IONS-SCNC: 8 MMOL/L (ref 9–17)
BASOPHILS # BLD: 0 % (ref 0–2)
BASOPHILS ABSOLUTE: 0.03 K/UL (ref 0–0.2)
BUN BLDV-MCNC: 32 MG/DL (ref 8–23)
CALCIUM SERPL-MCNC: 8.6 MG/DL (ref 8.6–10.4)
CHLORIDE BLD-SCNC: 102 MMOL/L (ref 98–107)
CO2: 30 MMOL/L (ref 20–31)
CREAT SERPL-MCNC: 1.34 MG/DL (ref 0.5–0.9)
EOSINOPHILS RELATIVE PERCENT: 0 % (ref 1–4)
GFR AFRICAN AMERICAN: 47 ML/MIN
GFR NON-AFRICAN AMERICAN: 39 ML/MIN
GFR SERPL CREATININE-BSD FRML MDRD: ABNORMAL ML/MIN/{1.73_M2}
GLUCOSE BLD-MCNC: 181 MG/DL (ref 70–99)
GLUCOSE BLD-MCNC: 332 MG/DL (ref 65–105)
GLUCOSE BLD-MCNC: 99 MG/DL (ref 65–105)
HCT VFR BLD CALC: 35.8 % (ref 36.3–47.1)
HEMOGLOBIN: 11.5 G/DL (ref 11.9–15.1)
IMMATURE GRANULOCYTES: 4 %
LYMPHOCYTES # BLD: 25 % (ref 24–43)
MCH RBC QN AUTO: 30.5 PG (ref 25.2–33.5)
MCHC RBC AUTO-ENTMCNC: 32.1 G/DL (ref 28.4–34.8)
MCV RBC AUTO: 95 FL (ref 82.6–102.9)
MONOCYTES # BLD: 8 % (ref 3–12)
NRBC AUTOMATED: 0 PER 100 WBC
PDW BLD-RTO: 12.6 % (ref 11.8–14.4)
PLATELET # BLD: ABNORMAL K/UL (ref 138–453)
PLATELET, FLUORESCENCE: 117 K/UL (ref 138–453)
PLATELET, IMMATURE FRACTION: 12.3 % (ref 1.1–10.3)
POTASSIUM SERPL-SCNC: 4.7 MMOL/L (ref 3.7–5.3)
RBC # BLD: 3.77 M/UL (ref 3.95–5.11)
SEG NEUTROPHILS: 63 % (ref 36–65)
SEGMENTED NEUTROPHILS ABSOLUTE COUNT: 6.02 K/UL (ref 1.5–8.1)
SODIUM BLD-SCNC: 140 MMOL/L (ref 135–144)
WBC # BLD: 9.5 K/UL (ref 3.5–11.3)

## 2022-05-01 PROCEDURE — 85025 COMPLETE CBC W/AUTO DIFF WBC: CPT

## 2022-05-01 PROCEDURE — 36415 COLL VENOUS BLD VENIPUNCTURE: CPT

## 2022-05-01 PROCEDURE — 6370000000 HC RX 637 (ALT 250 FOR IP): Performed by: NURSE PRACTITIONER

## 2022-05-01 PROCEDURE — 85055 RETICULATED PLATELET ASSAY: CPT

## 2022-05-01 PROCEDURE — 94761 N-INVAS EAR/PLS OXIMETRY MLT: CPT

## 2022-05-01 PROCEDURE — 94640 AIRWAY INHALATION TREATMENT: CPT

## 2022-05-01 PROCEDURE — 99239 HOSP IP/OBS DSCHRG MGMT >30: CPT | Performed by: INTERNAL MEDICINE

## 2022-05-01 PROCEDURE — 2580000003 HC RX 258: Performed by: NURSE PRACTITIONER

## 2022-05-01 PROCEDURE — 80048 BASIC METABOLIC PNL TOTAL CA: CPT

## 2022-05-01 PROCEDURE — 2700000000 HC OXYGEN THERAPY PER DAY

## 2022-05-01 PROCEDURE — 6370000000 HC RX 637 (ALT 250 FOR IP): Performed by: INTERNAL MEDICINE

## 2022-05-01 PROCEDURE — 94660 CPAP INITIATION&MGMT: CPT

## 2022-05-01 PROCEDURE — 82947 ASSAY GLUCOSE BLOOD QUANT: CPT

## 2022-05-01 RX ORDER — RANOLAZINE 500 MG/1
500 TABLET, EXTENDED RELEASE ORAL 2 TIMES DAILY
Qty: 28 TABLET | Refills: 0 | Status: SHIPPED | OUTPATIENT
Start: 2022-05-01 | End: 2022-07-25

## 2022-05-01 RX ORDER — PREDNISONE 20 MG/1
20 TABLET ORAL DAILY
Qty: 5 TABLET | Refills: 0 | Status: SHIPPED | OUTPATIENT
Start: 2022-05-01 | End: 2022-05-06

## 2022-05-01 RX ORDER — CYCLOBENZAPRINE HCL 5 MG
5 TABLET ORAL 2 TIMES DAILY PRN
Qty: 20 TABLET | Refills: 0 | Status: SHIPPED | OUTPATIENT
Start: 2022-05-01 | End: 2022-05-11

## 2022-05-01 RX ADMIN — VITAM B12 50 MCG: 100 TAB at 08:13

## 2022-05-01 RX ADMIN — FENOFIBRATE 160 MG: 160 TABLET ORAL at 08:13

## 2022-05-01 RX ADMIN — ISOSORBIDE DINITRATE 30 MG: 10 TABLET ORAL at 08:13

## 2022-05-01 RX ADMIN — INSULIN GLARGINE 50 UNITS: 100 INJECTION, SOLUTION SUBCUTANEOUS at 08:14

## 2022-05-01 RX ADMIN — SODIUM CHLORIDE, PRESERVATIVE FREE 10 ML: 5 INJECTION INTRAVENOUS at 08:20

## 2022-05-01 RX ADMIN — PANTOPRAZOLE SODIUM 40 MG: 40 TABLET, DELAYED RELEASE ORAL at 08:11

## 2022-05-01 RX ADMIN — ASPIRIN 81 MG: 81 TABLET, COATED ORAL at 08:14

## 2022-05-01 RX ADMIN — MAGNESIUM GLUCONATE 500 MG ORAL TABLET 400 MG: 500 TABLET ORAL at 08:13

## 2022-05-01 RX ADMIN — CARVEDILOL 3.12 MG: 3.12 TABLET, FILM COATED ORAL at 08:13

## 2022-05-01 RX ADMIN — ATORVASTATIN CALCIUM 40 MG: 80 TABLET, FILM COATED ORAL at 08:13

## 2022-05-01 RX ADMIN — OMEGA-3-ACID ETHYL ESTERS 1 G: 1 CAPSULE, LIQUID FILLED ORAL at 08:12

## 2022-05-01 RX ADMIN — CLOPIDOGREL 75 MG: 75 TABLET, FILM COATED ORAL at 08:13

## 2022-05-01 RX ADMIN — FERROUS SULFATE TAB EC 325 MG (65 MG FE EQUIVALENT) 325 MG: 325 (65 FE) TABLET DELAYED RESPONSE at 08:13

## 2022-05-01 RX ADMIN — CITALOPRAM 20 MG: 20 TABLET, FILM COATED ORAL at 08:13

## 2022-05-01 RX ADMIN — RANOLAZINE 500 MG: 500 TABLET, FILM COATED, EXTENDED RELEASE ORAL at 08:11

## 2022-05-01 RX ADMIN — Medication 1 TABLET: at 08:11

## 2022-05-01 RX ADMIN — BUDESONIDE AND FORMOTEROL FUMARATE DIHYDRATE 2 PUFF: 160; 4.5 AEROSOL RESPIRATORY (INHALATION) at 08:47

## 2022-05-01 RX ADMIN — PREDNISONE 40 MG: 20 TABLET ORAL at 08:11

## 2022-05-01 RX ADMIN — OXYBUTYNIN CHLORIDE 5 MG: 5 TABLET, EXTENDED RELEASE ORAL at 08:11

## 2022-05-01 ASSESSMENT — PAIN DESCRIPTION - FREQUENCY: FREQUENCY: CONTINUOUS

## 2022-05-01 ASSESSMENT — PAIN - FUNCTIONAL ASSESSMENT: PAIN_FUNCTIONAL_ASSESSMENT: PREVENTS OR INTERFERES SOME ACTIVE ACTIVITIES AND ADLS

## 2022-05-01 ASSESSMENT — PAIN SCALES - GENERAL: PAINLEVEL_OUTOF10: 7

## 2022-05-01 NOTE — DISCHARGE SUMMARY
[facility]  Office: [address], DO, Nara Ariza, DO, Kalen Corbin, DO, Thee Mao, DO, Octavio Gilbert MD, Jose Marvin MD, Lorelei Murillo MD, Anthony Melissa MD, Louis Vera MD, Nancy Stephens MD, Yasmin Mart MD, Mayda Mas, DO, Markel Fernandez, DO, Colton Rodriguez MD,  Zoie Peterson DO, Cody Comer MD, Smiley Pedraza MD, Baldemar Whyte MD, Chris Briseno DO, Kendra Rios MD, Marianne Cabrera MD, Nitesh Renteria, [facility], [facility] Armando, CNP, Rosa Adamson, CNP, Miah Arellano CNP, Pantera Phillip, [facility], Napoleon Mccauley, CNP, Darnell Moctezuma PA-C, Zainab Pro, DNP, Martínez Staley, CNP, Jacklyn Frankel, CNP, Ridge Kapoor, [facility], Anabella Gupta, [facility], Hugo Phoenix, [facility], Tal Mueller, CNP, Emily Lovell, CNP, Gianfranco Perales, [facility]    Discharge Summary     Patient ID: Thompson Wood  :  1950   MRN: [id number]     ACCOUNT:  [de-identified]   Patient's PCP: Kirill Headley MD  Admit Date: 2022   Discharge Date: 2022    Length of Stay: 6  Code Status:  Full Code  Admitting Physician: James Kat DO  Discharge Physician: Araceli Stoddard DO     Active Discharge Diagnoses:     Hospital Problem Lists:  Principal Problem (Resolved):    COPD exacerbation ([facility]  Active Problems:    Type 2 diabetes mellitus with diabetic polyneuropathy, with long-term current use of insulin (HCC)    Asthma with COPD ([facility]    GERD (gastroesophageal reflux disease)    Primary hypertension    Mixed hyperlipidemia    Obesity, Class III, BMI 40-49.9 (morbid obesity) ([facility]    Stage 3 chronic kidney disease ([facility]    Benign hypertension with CKD (chronic kidney disease) stage III ([facility]  Resolved Problems:    YAJAIRA (acute kidney injury) ([facility]    Neutrophilic leukocytosis      Admission Condition:  fair     Discharged Condition: good    Hospital Stay:     Hospital Course:  Thompson Wood is a 66 y.o.  female who initially presented to [facility] for evaluation of shortness of breath on 4/25/2022. Associated symptoms include cough and left-sided chest pain. Her comorbidities include history of COPD, chronic respiratory failure with hypoxia requiring 3 to 4 L around-the-clock at home, type 2 diabetes mellitus, hypertension, and coronary disease. She has had multiple hospitalizations this year for noncardiac chest pain. Most recent nuclear stress test revealed no evidence of ischemia at stress or at rest.  She follow-up with her cardiologist as recently as 1 month ago. Upon presentation, patient discovered to be significantly wheezy. She was started on IV steroids and pulmonary updrafts. Chest pain was controlled with opiates. She subsequently developed significant hyperglycemia and was transition from a MedSurg bed to a progressive bed and started on insulin infusion. This was subsequently transitioned to her home dose. Her COPD exacerbation significantly improved after IV steroid administration. After reinitiation of her home dose of insulin, patient became hypoglycemic. She was still complain of chest pain throughout her hospital stay. Her previous records were extensively reviewed and revealed that this is likely noncardiac chest pain. Troponins remain negative. She was started on Ranexa with slight improvement of her chest pain. Ultimately, her chest pain is noncardiac in nature and her COPD exacerbation has resolved. She is appropriate for discharge today. She is establishing care with a new PCP on May 10.     Significant therapeutic interventions:   -IV steroids, pulmonary updrafts, treatment of COPD exacerbation  -Pain control for her left-sided chest pain which was noncardiac in nature  -Insulin infusion for hyperglycemia    Significant Diagnostic Studies:   Labs / Micro:  CBC:   Lab Results   Component Value Date    WBC 9.5 05/01/2022    RBC 3.77 05/01/2022    RBC 3.37 03/08/2012    HGB 11.5 05/01/2022    HCT 35.8 05/01/2022    MCV 95.0 05/01/2022    MCH 30.5 05/01/2022    MCHC 32.1 05/01/2022    RDW 12.6 05/01/2022    PLT See Reflexed IPF Result 05/01/2022     03/08/2012     CMP:    Lab Results   Component Value Date    GLUCOSE 181 05/01/2022    GLUCOSE 123 03/08/2012     05/01/2022    K 4.7 05/01/2022     05/01/2022    CO2 30 05/01/2022    BUN 32 05/01/2022    CREATININE 1.34 05/01/2022    ANIONGAP 8 05/01/2022    ALKPHOS 50 04/28/2022    ALT 26 04/28/2022    AST 18 04/28/2022    BILITOT 0.20 04/28/2022    LABALBU 3.6 04/28/2022    LABALBU 3.5 11/22/2011    ALBUMIN 1.4 04/28/2022    LABGLOM 39 05/01/2022    GFRAA 47 05/01/2022    GFR      05/01/2022    PROT 6.1 04/28/2022    CALCIUM 8.6 05/01/2022      Radiology:  XR CHEST PORTABLE    Result Date: 4/25/2022  No acute process. Consultations:    Consults:     Final Specialist Recommendations/Findings:   IP CONSULT TO IV TEAM  IP CONSULT TO HOME CARE NEEDS      The patient was seen and examined on day of discharge and this discharge summary is in conjunction with any daily progress note from day of discharge. Discharge plan:     Disposition: Home    Physician Follow Up: Your new PCP as scheduled    Go to  Go to your PCP appointment as scheduled on May 10. Requiring Further Evaluation/Follow Up POST HOSPITALIZATION/Incidental Findings:  Follow-up with your new PCP next week    Diet: cardiac diet and diabetic diet    Activity: As tolerated    Instructions to Patient: None    Discharge Medications:      Medication List      START taking these medications    cyclobenzaprine 5 MG tablet  Commonly known as: FLEXERIL  Take 1 tablet by mouth 2 times daily as needed for Muscle spasms     predniSONE 20 MG tablet  Commonly known as: DELTASONE  Take 1 tablet by mouth daily for 5 days     ranolazine 500 MG extended release tablet  Commonly known as: RANEXA  Take 1 tablet by mouth 2 times daily for 14 days        CHANGE how you take these medications    gabapentin 300 MG capsule  Commonly known as: Neurontin  Take 1 capsule by mouth 3 times daily for 30 days. What changed: when to take this        CONTINUE taking these medications    Adjust Bath/Shower Seat/Back Misc  Use daily for shower     albuterol (2.5 MG/3ML) 0.083% nebulizer solution  Commonly known as: PROVENTIL  Take 3 mLs by nebulization every 6 hours as needed for Wheezing     aspirin 81 MG EC tablet  TAKE 1 TABLET BY MOUTH ONCE DAILY     atorvastatin 40 MG tablet  Commonly known as: Lipitor  Take 1 tablet by mouth daily     B-D SINGLE USE SWABS REGULAR Pads  THREE TIMES A DAY     Basaglar KwikPen 100 UNIT/ML injection pen  Generic drug: insulin glargine  Inject 55 Units into the skin 2 times daily     BiPAP Machine Misc     * blood glucose monitor kit and supplies  Use daily to check BS     * ONE TOUCH ULTRA 2 w/Device Kit     carvedilol 3.125 MG tablet  Commonly known as: COREG  TAKE 1 TAB BY MOUTH TWICE A DAY ( IN THE MORNING AND BEDTIME )     CertaVite/Antioxidants Tabs  TAKE 1 TABLET BY MOUTH ONCE DAILY     citalopram 20 MG tablet  Commonly known as: CELEXA  TAKE 1 TABLET BY MOUTH DAILY NEEDS TO DECREASE THE CELEXA TO 20 MG DUE TO SIDE EFFECTS ON THE HEART     clopidogrel 75 MG tablet  Commonly known as: PLAVIX  TAKE 1 TAB BY MOUTH ONCE A DAY ( IN THE MORNING ) -THIS MEDICINE MAY BE TAKENWITH OR WITHOUT FOOD     Compression Stockings Misc  by Does not apply route Pressure between 20 - 25 .      Dexcom G6 Sensor Misc  Use daily for checking blood sugars     dicyclomine 10 MG capsule  Commonly known as: BENTYL  TAKE 1 CAPSULE BY MOUTH TWICE A DAY AS NEEDED FOR ABDOMINAL SPASMS     docusate sodium 100 MG capsule  Commonly known as: COLACE  TAKE 1 CAPSULE BY MOUTH TWICE A DAY     fenofibrate 160 MG tablet  Commonly known as: TRIGLIDE  TAKE 1 TABLET BY MOUTH DAILY     FeroSul 325 (65 Fe) MG tablet  Generic drug: ferrous sulfate  TAKE 1 TAB BY MOUTH IN THE MORNING     furosemide 20 MG tablet  Commonly known as: LASIX  TAKE 1 TABLET BY MOUTH ONCE DAILY AS NEEDED     Icosapent Ethyl 1 g Caps capsule  Commonly known as: VASCEPA  TAKE 1 CAPSULE BY MOUTH TWICE A DAY     ipratropium-albuterol 0.5-2.5 (3) MG/3ML Soln nebulizer solution  Commonly known as: DUONEB  Inhale 3 mLs into the lungs every 4 hours     isosorbide dinitrate 30 MG tablet  Commonly known as: ISORDIL  TAKE 1 TABLET BY MOUTH ONCE DAILY     ketoconazole 2 % cream  Commonly known as: NIZORAL  Apply topically  2 times a day. lidocaine 4 % cream  Commonly known as: LMX  Apply topically every 8 hrs as needed for pain     Lift Chair Misc  by Does not apply route     losartan 25 MG tablet  Commonly known as: COZAAR  TAKE 1 TABLET BY MOUTH ONCE DAILY     magnesium oxide 400 MG tablet  Commonly known as: MAG-OX     Melatonin 10 MG Tabs  Take 10 mg by mouth nightly     METAMUCIL PO     metFORMIN 1000 MG tablet  Commonly known as: GLUCOPHAGE  TAKE 1 TAB BY MOUTH TWICE A DAY ( IN THE MORNING AND BEDTIME )     nitroGLYCERIN 0.4 MG SL tablet  Commonly known as: NITROSTAT     NovoLOG FlexPen 100 UNIT/ML injection pen  Generic drug: insulin aspart  IF<139 NO INSULIN; 140-199-2 UN;200-249-4 UN;250-299-6 UN;300-349-8 UN;350-400=10 UN;ABOVE 400-12 UNIT(S)     nystatin 340909 UNIT/GM powder  Commonly known as: MYCOSTATIN  Apply 3 times daily. oxybutynin 5 MG extended release tablet  Commonly known as: DITROPAN-XL  TAKE 1 TABLET BY MOUTH DAILY     oxyCODONE-acetaminophen 5-325 MG per tablet  Commonly known as: Percocet  Take 1 tablet by mouth See Admin Instructions for 30 days. Intended supply: 30 days.  1 tab 3-4 times a day prn     OXYGEN     pantoprazole 40 MG tablet  Commonly known as: PROTONIX  TAKE 1 TABLET BY MOUTH TWICE A DAY     Symbicort 160-4.5 MCG/ACT Aero  Generic drug: budesonide-formoterol     topiramate 100 MG tablet  Commonly known as: TOPAMAX  TAKE 1 TABLET BY MOUTH NIGHTLY     * True Metrix Blood Glucose Test strip  Generic drug: blood glucose test strips  THREE TIMES A DAY     * OneTouch Ultra strip  Generic drug: blood glucose test strips  TEST TWO (2) TO THREE (3) TIMES A DAY& AS NEEDED     * OneTouch Ultra strip  Generic drug: blood glucose test strips  TEST TWO (2) TO THREE (3) TIMES A DAY & AS NEEDED FOR SYMPTOMS OF IRREGULAR BLOOD GLUCOSE     * OneTouch Ultra strip  Generic drug: blood glucose test strips  TEST TWO (2) TO THREE (3) TIMES A DAY & AS NEEDED FOR SYMPTOMS OF IRREGULAR BLOOD GLUCOSE     Trulicity 9.70 KD/6.9UV Sopn  Generic drug: Dulaglutide  Inject 0.75 mg into the skin every 7 days If pen needle is needed, please request     UltiCare Mini Pen Needles 31G X 6 MM Misc  Generic drug: Insulin Pen Needle  USE AS DIRECTED     vitamin B-12 100 MCG tablet  Commonly known as: CYANOCOBALAMIN  take half a tablet BY MOUTH ONCE DAILY     zoster recombinant adjuvanted vaccine 50 MCG/0.5ML Susr injection  Commonly known as: Shingrix  50 MCG IM then repeat 2-6 months. * This list has 6 medication(s) that are the same as other medications prescribed for you. Read the directions carefully, and ask your doctor or other care provider to review them with you. STOP taking these medications    ibuprofen 600 MG tablet  Commonly known as: ADVIL;MOTRIN           Where to Get Your Medications      These medications were sent to [address], [address]  [place] [address], [address]  [address]    Phone: [phone]   cyclobenzaprine 5 MG tablet  predniSONE 20 MG tablet  ranolazine 500 MG extended release tablet         No discharge procedures on file. Time Spent on discharge is  45 mins in patient examination, evaluation, counseling as well as medication reconciliation, prescriptions for required medications, discharge plan and follow up.     Electronically signed by   Araceli Stoddard DO  5/1/2022  2:04 PM      Thank you  Mayte Boles MD for the opportunity to be involved in this patient's care.

## 2022-05-01 NOTE — CARE COORDINATION
Transitional Planning    Spoke with pt, requesting hc. Agreeable to [facility] and [place] [facility]. Referrals sent to both. [facility] accepts. Pt needs transportation home via ambulette d/c oxygen use. Received call from [address] [facility]. Transport ETA 2951      Discharge [address] [facility]  Clinical Case Management Department  Written by:  Ortiz Keane RN    Patient Name: Natasha Dotson  Attending Provider: Bo Tidwell DO  Admit Date: 2022  1:46 PM  MRN: [id number]  Account: [de-identified]                     : 1950  Discharge Date:       Disposition: home    Ortiz Keane RN

## 2022-05-01 NOTE — PLAN OF CARE
Problem: Pain  Goal: Verbalizes/displays adequate comfort level or baseline comfort level  Outcome: Adequate for Discharge     Problem: Safety - Adult  Goal: Free from fall injury  Outcome: Adequate for Discharge     Problem: Chronic Conditions and Co-morbidities  Goal: Patient's chronic conditions and co-morbidity symptoms are monitored and maintained or improved  Outcome: Adequate for Discharge     Problem: Discharge Planning  Goal: Discharge to home or other facility with appropriate resources  Outcome: Adequate for Discharge

## 2022-05-01 NOTE — DISCHARGE INSTR - DIET

## 2022-05-01 NOTE — CARE COORDINATION
05/01/22 1511   IMM Letter   IMM Letter given to Patient/Family/Significant other/Guardian/POA/by: provided copy of original, declines 4 hr window, declines appeal   IMM Letter date given: 05/01/22   IMM Letter time given: 2607

## 2022-05-01 NOTE — DISCHARGE INSTR - COC
Continuity of Care Form    Patient Name: Brody Larios   :  1950  MRN:  [id number]    Admit date:  2022  Discharge date:  2022    Code Status Order: Full Code   Advance Directives:      Admitting Physician:  Aracelis Scott DO  PCP: Sharon Gonzalez MD    Discharging Nurse: [facility] Unit/Room#: 5487/0683-91  Discharging Unit Phone Number: [phone]    Emergency Contact:   Extended Emergency Contact Information  Primary Emergency Contact: Aden Kirkland *HIPAA  Address: [place] [address] Phone: [phone]  Work Phone: [phone]  Mobile Phone: [phone]  Relation: Child    Past Surgical History:  Past Surgical History:   Procedure Laterality Date    ABLATION OF DYSRHYTHMIC FOCUS      CARPAL TUNNEL RELEASE Right     CATARACT REMOVAL WITH IMPLANT Bilateral     CHOLECYSTECTOMY      COLONOSCOPY      COLONOSCOPY  04/10/2017    tubular adenomo polyp , mod. sigmoid diverticulosis, min. int. hemorrhoids    COLONOSCOPY N/A 3/2/2021    COLONOSCOPY WITH BIOPSY performed by Yovani Renae MD at [address] [place] [address]  2013    DILATION AND [address]      laser    INCONTINENCE SURGERY      Percutaneous nerve stimulation Dr Lennox Sailor 2013     INSERTABLE CARDIAC MONITOR  2015    [employer] REVEAL [address] #[id number] MRI CONDTIONAL OK 3T.  IMMEDIATELY POST IMPLANT    OTHER SURGICAL HISTORY  09/10/15    removal of interstim    OH COLSC FLX W/REMOVAL LESION BY HOT BX FORCEPS N/A 4/10/2017    COLONOSCOPY POLYPECTOMY HOT BIOPSY performed by Stuart Haines MD at [place] [address]  2017    UPPER GASTROINTESTINAL ENDOSCOPY  2016    Dr Juliet Trinidad N/A 3/2/2021    EGD BIOPSY performed by Yovani Renae MD at [facility]       Immunization History:   Immunization History   Administered Date(s) Administered    COVID-19, Rahul Greene, Primary or Immunocompromised, PF, 100mcg/0.5mL 02/10/2021, 03/10/2021    Influenza 10/18/2012, 10/07/2013    Influenza Virus Vaccine 10/02/2014, 10/20/2015    Influenza Whole 09/01/2010    Influenza, High Dose (Fluzone 65 yrs and older) 11/10/2016, 10/19/2017, 10/08/2018    Influenza, Norva Rodrick, IM, PF (6 mo and older Fluzone, Flulaval, Fluarix, and 3 yrs and older Afluria) 11/02/2019, 10/08/2021    Influenza, Quadv, adjuvanted, 65 yrs +, IM, PF (Fluad) 09/14/2020    Pneumococcal Conjugate 13-valent ([id number]) 06/15/2016    Pneumococcal Polysaccharide ([id number]) 01/01/2009, 09/11/2017       Active Problems:  Patient Active Problem List   Diagnosis Code    Chronic pain of left knee M25.562, G89.29    Type 2 diabetes mellitus with diabetic polyneuropathy, with long-term current use of insulin ([facility] E11.42, Z79.4    COPD (chronic obstructive pulmonary disease) J44.9    Nonintractable migraine, unspecified migraine type G43.009    Coronary artery disease due to lipid rich plaque I25.10, I25.83    DDD (degenerative disc disease), lumbar M51.36    Chronic, continuous use of opioids F11.90    DDD (degenerative disc disease), cervical M50.30    Osteoarthritis of cervical spine M47.812    Medication monitoring encounter Z51.81    GERD (gastroesophageal reflux disease) K21.9    Primary hypertension I10    Mixed hyperlipidemia E78.2    Insomnia G47.00    Lumbosacral spondylosis without myelopathy M47.817    Sacroiliitis, not elsewhere classified (HCC) M46.1    Carpal tunnel syndrome G56.00    Mixed stress and urge urinary incontinence N39.46    Bilateral low back pain with sciatica M54.40    Intractable migraine with aura without status migrainosus G43.119    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    Anxiety and depression F41.9, F32. A    Chronic migraine without aura without status migrainosus, not intractable G43.709    Morbid (severe) obesity with alveolar hypoventilation ([facility]) E66.2    Lung nodule R91.1 Neuropathy G62.9    Obstructive sleep apnea syndrome G47.33    Asthma with COPD ([facility]) J44.9    Obesity, Class III, BMI 40-49.9 (morbid obesity) ([facility]) E66.01    Stage 3 chronic kidney disease ([facility]) N18.30    Benign hypertension with CKD (chronic kidney disease) stage III ([facility]) I12.9, N18.30    Secondary diabetes mellitus with stage 3 chronic kidney disease (GFR 30-59) ([facility]) E13.22, N18.30    CKD (chronic kidney disease) stage 3, GFR 30-59 ml/min N18.30    Lumbar radiculopathy, chronic M54.16    Sessile colonic polyp K63.5    Morbid obesity ([facility]) E66.01    Adenomatous polyp of ascending colon D12.2    Irritable bowel syndrome with both constipation and diarrhea K58.2    Abdominal bloating R14.0    Long term (current) use of insulin ([facility]) Z79.4    Major depressive disorder, single episode, unspecified F32.9    Permanent atrial fibrillation ([facility]) I48.21    Polyneuropathy, unspecified G62.9    Other chronic pain G89.29    Opioid use, unspecified, uncomplicated Z75.15    Atypical chest pain R07.89    Left ventricular diastolic dysfunction T75.6    Transaminasemia R74.01    Acute respiratory failure with hypoxia and hypercarbia ([facility]) J96.01, J96.02    Macrocytosis D75.89    Morbid obesity with BMI of 40.0-44.9, adult ([facility]) E66.01, Z68.41       Isolation/Infection:   Isolation            No Isolation          Patient Infection Status       Infection Onset Added Last Indicated Last Indicated By Review Planned Expiration Resolved Resolved By    None active    Resolved    COVID-19 (Rule Out) 02/01/22 02/01/22 02/01/22 COVID-19, Rapid (Ordered)   02/01/22 Rule-Out Test Resulted    COVID-19 (Rule Out) 02/26/21 02/27/21 02/26/21 COVID-19 (Ordered)   02/28/21 Rule-Out Test Resulted    COVID-19 (Rule Out) 07/01/20 07/01/20 07/01/20 COVID-19 (Ordered)   07/01/20 Rule-Out Test Resulted            Nurse Assessment:  Last Vital Signs: BP (!) 112/54   Pulse 68   Temp 97.9 °F (36.6 °C) (Oral)   Resp 18   Ht 5' 2\" (1.575 m)   Wt 234 lb (106.1 kg)   LMP  (LMP Unknown)   SpO2 97%   BMI 42.80 kg/m²     Last documented pain score (0-10 scale): Pain Level: 7  Last Weight:   Wt Readings from Last 1 Encounters:   04/25/22 234 lb (106.1 kg)     Mental Status:  oriented, alert, coherent, logical, thought processes intact, and able to concentrate and follow conversation    IV Access:  - None    Nursing Mobility/ADLs:  Walking   Independent  Transfer  Assisted  Bathing  Assisted  Dressing  Assisted  [facility] 22 Delivery   whole    Wound Care Documentation and Therapy:        Elimination:  Continence: Bowel: Yes  Bladder: No  Urinary Catheter: None   Colostomy/Ileostomy/Ileal Conduit: No       Date of Last BM: 5/1/2022    Intake/Output Summary (Last 24 hours) at 5/1/2022 1418  Last data filed at 5/1/2022 1200  Gross per 24 hour   Intake 1357.36 ml   Output 2000 ml   Net -642.64 ml     I/O last 3 completed shifts: In: 3573.4 [P.O.:650; I.V.:2923.4]  Out: 2400 [Urine:2400]    Safety Concerns: At Risk for Falls    Impairments/Disabilities:      Vision and Hearing    Nutrition Therapy:  Current Nutrition Therapy:   - Oral Diet:  Carb Control 4 carbs/meal (1800kcals/day)    Routes of Feeding: Oral  Liquids: No Restrictions  Daily Fluid Restriction: no  Last Modified Barium Swallow with Video (Video Swallowing Test): not done    Treatments at the Time of Hospital Discharge:   Respiratory Treatments: NA  Oxygen Therapy:  is on oxygen at 3 L/min per nasal cannula.   Ventilator:    - CPAP   only when sleeping    Rehab Therapies: Physical Therapy and Occupational Therapy  Weight Bearing Status/Restrictions: No weight bearing restrictions  Other Medical Equipment (for information only, NOT a DME order):  walker  Other Treatments: NA    Patient's personal belongings (please select all that are sent with patient):  {[facility] DME Belongings:[id number]}    RN SIGNATURE:  Electronically signed by Tigre Hammonds Willow Estevez on 5/1/22 at 5:50 PM EDT    CASE MANAGEMENT/SOCIAL WORK SECTION    Inpatient Status Date: ***    Readmission Risk Assessment Score:  Readmission Risk              Risk of Unplanned Readmission:  30           Discharging to Facility/ Agency   Name:   [facility] [address] Details  Fax          Madan Jaramillo, [facility] [address]       Phone: [phone]       Fax: [phone]        Address:  Phone:  Fax:    Dialysis Facility (if applicable)   Name:  Address:  Dialysis Schedule:  Phone:  Fax:    / signature: Electronically signed by Ilene Portillo RN on 5/1/22 at 3:08 PM EDT    PHYSICIAN SECTION    Prognosis: Good    Condition at Discharge: Stable    Rehab Potential (if transferring to Rehab): Good    Recommended Labs or Other Treatments After Discharge: None    Physician Certification: I certify the above information and transfer of Amrit Chacon  is necessary for the continuing treatment of the diagnosis listed and that she requires Home Care for greater 30 days.      Update Admission H&P: No change in H&P    PHYSICIAN SIGNATURE:  Electronically signed by Peyton An DO on 5/1/22 at 2:18 PM EDT

## 2022-05-01 NOTE — PLAN OF CARE
Problem: Discharge Planning  Goal: Discharge to home or other facility with appropriate resources  Outcome: Completed     Problem: ABCDS Injury Assessment  Goal: Absence of physical injury  Outcome: Completed

## 2022-05-01 NOTE — PROGRESS NOTES
[facility]  Office: [address], DO, Stefani Hinson, DO, Ancelmo Villagran, DO, Lennox Mars Blood, DO, Dionicio Schuster MD, Kin Gutierrez MD, Boris Boswell MD, Abdi Gray MD, Lilo Harris MD, Matt Lyons MD, Ayo Venegas MD, Nicolle Archibald, DO, Rafita Michael, DO, Nubia Fam MD,  Seamus Dennis, DO, Burna Cockayne, MD, Roberto Brown MD, Janna Moreau MD, Rafael Brody, DO, Wyatt Bhardwaj MD, Brandon Rosario MD, Elgin Hobbs CNP, [facility] Garc[facility] CNP, Antonia Mcbride CNP, Jeanie Edwards CNP, Boby Caballero CNP, Cynthia Hines CNP, STEVO ErazoC, Daisey Meigs, [facility], Yulia Guevara, CNP, Opal Ramos, CNP, Rc Fuentes CNP, Paddy Duane, CNS, Naida Cage [facility], Clari Riddle, CNP, Amrita Coles CNP, Flor Kee, CNP         Rústephanie Dutton [place] [address]    Progress Note    5/1/2022    1:49 PM    Name:   Breonna Saha  MRN:     [id number]     Acct:      [de-identified]   Room:   [address] [place] [address] Day:  6  Admit Date:  4/25/2022  1:46 PM    PCP:   Norene Schirmer, MD  Code Status:  Full Code    Subjective:     C/C:   Chief Complaint   Patient presents with    Shortness of Breath     Interval History Status: not changed. Patient seen examined this afternoon. No acute events overnight. Patient thinks that her chest pain has improved since starting the Ranexa. Resting in bed. Still using an external catheter. Not getting out of bed much. Still remains on her usual dose of supplemental oxygen. Has an appointment with her new PCP on May 10. Brief History:     Ms. Favio Kennedy is a 66year old female who presented for evaluation of increased SOB over the last several days. She states she does have home oxygen for prn usage that she has been using continuously. She states she has has associated cough and chest pain that is worse when laying down. She denies any fever or chills.  She does have a medical history that includes COPD, DM, HTN and CAD. She also complains of GERD and states she has been out of her home Prilosec. She states she has had a falling out with her PCP and is scheduled to see a new provider in early May. She is compliant with home medications. She is a former smoker (quit 2000), denies use of alcohol or recreational drugs.     -Hyperglycemia has been an issue, transferred to stepdown on 4/27 for insulin infusion.  -Insulin drip off 4/28. Re-initiation of SQ insulin.  -Hypoglycemic overnight on 4/29.  -Not wanting to go home. Still having chest pain. Had nuclear exam 1 month ago. No evidence of ischemia. Saw her cardiologist 1 month ago as well. Review of Systems:     Constitutional:  negative for chills, fevers, sweats  Respiratory: Reports stable shortness of breath; negative for cough  Cardiovascular: Reports stable, persistent left-sided chest wall pain, which is reproducible. Negative for lower extremity edema, palpitations  Gastrointestinal:  negative for abdominal pain, constipation, diarrhea, nausea, vomiting  Neurological:  negative for dizziness, headache    Medications: Allergies: Allergies   Allergen Reactions    Robitussin [Guaifenesin] Anaphylaxis    Codeine Swelling    Compazine [Prochlorperazine Maleate] Hives and Swelling    Iodides Itching    Morphine Other (See Comments)     hallucinations    Moxifloxacin      Other reaction(s): Intolerance-unknown  Other reaction(s):  Intolerance-unknown    Reglan [Metoclopramide] Nausea Only    Avelox [Moxifloxacin Hcl In Nacl] Rash     Dermatitis      Cipro Xr Rash     dermatitis    Sulfa Antibiotics Rash       Current Meds:   Scheduled Meds:    ranolazine  500 mg Oral BID    insulin glargine  50 Units SubCUTAneous BID    predniSONE  40 mg Oral Daily    insulin lispro  0-18 Units SubCUTAneous TID     insulin lispro  0-9 Units SubCUTAneous Nightly    aspirin  81 mg Oral Daily    atorvastatin  40 mg Oral Daily    carvedilol  3.125 mg Oral BID     citalopram  20 mg Oral Daily    clopidogrel  75 mg Oral Daily    fenofibrate  160 mg Oral Daily    ferrous sulfate  325 mg Oral Daily with breakfast    omega-3 acid ethyl esters  1 g Oral Daily    isosorbide dinitrate  30 mg Oral Daily    [Held by provider] losartan  25 mg Oral Daily    magnesium oxide  400 mg Oral Daily    therapeutic multivitamin-minerals  1 tablet Oral Daily    oxybutynin  5 mg Oral Daily    pantoprazole  40 mg Oral BID    budesonide-formoterol  2 puff Inhalation Daily    topiramate  100 mg Oral Nightly    vitamin B-12  50 mcg Oral Daily    sodium chloride flush  5-40 mL IntraVENous 2 times per day     Continuous Infusions:    sodium chloride Stopped (05/01/22 1239)    dextrose 100 mL/hr (04/29/22 0646)    sodium chloride       PRN Meds: oxyCODONE-acetaminophen **OR** oxyCODONE-acetaminophen, calcium carbonate, cyclobenzaprine, albuterol, ipratropium-albuterol, docusate sodium, melatonin, glucose, dextrose, glucagon (rDNA), dextrose, sodium chloride flush, sodium chloride, ondansetron **OR** ondansetron    Data:     Past Medical History:   has a past medical history of Abdominal bloating, Adenomatous polyp of ascending colon, Allergic rhinitis, Anxiety, Arthritis, Asthma, Atrial fibrillation ([facility] [address] Back pain, Benign hypertension with CKD (chronic kidney disease) stage III ([address] CAD (coronary artery disease), Caffeine use, CHF (congestive heart failure) ([facility] [address] Chronic kidney disease, CKD (chronic kidney disease) stage 3, GFR 30-59 ml/min ([facility]), COPD (chronic obstructive pulmonary disease) ([address] Degeneration of lumbar or lumbosacral intervertebral disc, Depression, DM (diabetes mellitus) ([facility] [address] Emphysema of lung ([address] Gastritis, GERD (gastroesophageal reflux disease), Hearing loss, Hematuria, Hiatal hernia, HTN (hypertension), benign, Hypertriglyceridemia, Incontinence of urine, Irritable bowel syndrome with both constipation and diarrhea, Knee arthropathy, Lumbosacral spondylosis without myelopathy, Migraine headache, Mumps, Neuropathy, Obesity, On home oxygen therapy, HIGINIO on CPAP, Otitis media, Secondary diabetes mellitus with stage 3 chronic kidney disease (GFR 30-59) ([facility]), Stroke ([facility] [address] Tinnitus, Type 2 diabetes mellitus without complication ([facility] [address] Type II or unspecified type diabetes mellitus without mention of complication, not stated as uncontrolled, UTI (lower urinary tract infection), and Varicose vein. Social History:   reports that she quit smoking about 22 years ago. Her smoking use included cigarettes. She has a 123.00 pack-year smoking history. She has never used smokeless tobacco. She reports that she does not drink alcohol and does not use drugs. Family History:   Family History   Problem Relation Age of Onset    Diabetes Mother     Heart Disease Mother     Stomach Cancer Father     Diabetes Maternal Grandmother         also aunts and uncles (maternal)    Emphysema Sister        Vitals:  BP (!) 112/54   Pulse 68   Temp 97.9 °F (36.6 °C) (Oral)   Resp 18   Ht 5' 2\" (1.575 m)   Wt 234 lb (106.1 kg)   LMP  (LMP Unknown)   SpO2 97%   BMI 42.80 kg/m²   Temp (24hrs), Av.3 °F (36.8 °C), Min:97.9 °F (36.6 °C), Max:99 °F (37.2 °C)    Recent Labs     22  1152 22  1717 22  1940 22  1142   POCGLU 126* 314* 344* 99       I/O (24Hr):     Intake/Output Summary (Last 24 hours) at 2022 1349  Last data filed at 2022 1200  Gross per 24 hour   Intake 1357.36 ml   Output 2000 ml   Net -642.64 ml       Labs:  Hematology:  Recent Labs     22  0541 22  0528 22  0536   WBC 14.9* 9.6 9.5   RBC 4.27 3.81* 3.77*   HGB 13.3 11.8* 11.5*   HCT 41.9 36.6 35.8*   MCV 98.1 96.1 95.0   MCH 31.1 31.0 30.5   MCHC 31.7 32.2 32.1   RDW 12.6 12.6 12.6    152 See Reflexed IPF Result   MPV 11.4 12.3  --      Chemistry:  Recent Labs     22  0541 22  0528 22  1852 22  0536  137  --  140   K 4.1 4.6  --  4.7    101  --  102   CO2 28 28  --  30   GLUCOSE 33* 167*  --  181*   BUN 42* 34*  --  32*   CREATININE 1.51* 1.30*  --  1.34*   ANIONGAP 10 8*  --  8*   LABGLOM 34* 40*  --  39*   GFRAA 41* 49*  --  47*   CALCIUM 8.8 8.6  --  8.6   TROPHS  --   --  8  --      Recent Labs     04/30/22  0527 04/30/22  0730 04/30/22  1152 04/30/22  1717 04/30/22  1940 05/01/22  1142   POCGLU 150* 95 126* 314* 344* 99     ABG:  Lab Results   Component Value Date    POCPH 7.239 10/06/2021    PH 7.346 11/21/2011    POCPCO2 77.6 10/06/2021    PCO2 49.8 11/21/2011    POCPO2 137.2 10/06/2021    PO2ART 61.3 11/21/2011    POCHCO3 33.1 10/06/2021    IEF0XXE 26.5 11/21/2011    NBEA NOT REPORTED 10/06/2021    PBEA 3 10/06/2021    LBP4PLS NOT REPORTED 10/06/2021    WZJQ4YAF 98 10/06/2021    S1WORNJY 89.7 11/21/2011    FIO2 UNKNOWN 04/25/2022     Lab Results   Component Value Date/Time    SPECIAL NOT REPORTED 12/31/2021 12:36 PM     Lab Results   Component Value Date/Time    CULTURE ENTEROCOCCUS FAECALIS 10 to 50,000 CFU/ML (A) 12/31/2021 12:36 PM    CULTURE PRESUMPTIVE CANDIDA ALBICANS 10 to 50,000 CFU/ML (A) 12/31/2021 12:36 PM       Radiology:  XR CHEST PORTABLE    Result Date: 4/25/2022  No acute process. Physical Examination:        General appearance:  alert, cooperative and no distress, morbidly obese  female, sitting up in bed.   Mental Status:  oriented to person, place and time and normal affect  HEENT: Supple cannula present, EOMI, sclera anti-icteric, large neck circumference  Lungs:  clear to auscultation bilaterally, very faint end expiratory wheezing heard occasionally in the right upper and right lower lung field, no rhonchi, normal effort  Heart:  regular rate and rhythm, no murmur, chest pain with palpation of left anterior chest wall  Abdomen:  soft, nontender, nondistended, normal bowel sounds, protuberant  Extremities:  no edema, redness, tenderness in the calves  Skin:  no gross lesions, rashes, induration    Assessment:        Hospital Problems           Last Modified POA    * (Principal) COPD exacerbation ([facility] [address] 4/25/2022 Yes    Type 2 diabetes mellitus with diabetic polyneuropathy, with long-term current use of insulin (HCC) (Chronic) 4/25/2022 Yes    YAJAIRA (acute kidney injury) ([facility] [address] 4/27/2022 No    Asthma with COPD ([facility] [address] 4/26/2022 Yes    GERD (gastroesophageal reflux disease) (Chronic) 4/26/2022 Yes    Primary hypertension 4/26/2022 Yes    Mixed hyperlipidemia (Chronic) 4/26/2022 Yes    Overview Signed 9/7/2015  4:50 AM by Ángel Edouard     replace inactive diagnosis         Obesity, Class III, BMI 40-49.9 (morbid obesity) ([facility] [address] 4/26/2022 Yes    Stage 3 chronic kidney disease ([facility] [address] 4/26/2022 Yes    Benign hypertension with CKD (chronic kidney disease) stage III ([facility] [address] 4/26/2022 Yes    Neutrophilic leukocytosis 5/97/2315 No          Plan:        Acute exacerbation of COPD-Continue prednisone 40 mg qd at present. Will taper at discharge today. Completed Rocephin/azithromycin. Continue updrafts. Albuterol as needed. YAJAIRA - resolved. Back to baseline. Noncardiac chest pain - resume home percocet. Continue ranexa for 2 weeks. Establishing care with PCP on May 10. Changing Flexeril to Zanaflex. Type 2 diabetes mellitus with hyporglycemia-hypoglycemia has improved. Home insulin regimen  Transaminitis - resolved. Morbid obesity with BMI of 42.80- diet/weight loss/exercise recommended  Leukocytosis- resolved  Dispo: Okay for discharge today.     Francine Solorio DO  5/1/2022  1:49 PM

## 2022-05-02 ENCOUNTER — TELEPHONE (OUTPATIENT)
Dept: FAMILY MEDICINE CLINIC | Age: 72
End: 2022-05-02

## 2022-05-02 ENCOUNTER — TELEPHONE (OUTPATIENT)
Dept: PAIN MANAGEMENT | Age: 72
End: 2022-05-02

## 2022-05-02 LAB — GLUCOSE BLD-MCNC: 108 MG/DL (ref 65–105)

## 2022-05-02 NOTE — TELEPHONE ENCOUNTER
[facility] Transitions Initial Follow Up Call    Outreach made within 2 business days of discharge: Yes    Patient: Javeir Gaitan Patient : 1950   MRN: [id number]  Reason for Admission: There are no discharge diagnoses documented for the most recent discharge. Discharge Date: 22       Spoke with: patient     Discharge department/facility: [facility]      TCM Interactive Patient Contact:  Was patient able to fill all prescriptions: Yes  Was patient instructed to bring all medications to the follow-up visit: Yes  Is patient taking all medications as directed in the discharge summary?  Yes  Does patient understand their discharge instructions: Yes  Does patient have questions or concerns that need addressed prior to 7-14 day follow up office visit: no    Pt stated she did not wish to [facility] follow up states she has too many appt coming up   Follow Up  Future Appointments   Date Time Provider Clarence Mas   2022 11:20 AM VERONIKA White - [address]   2022 10:15 AM Marcelino Roy DPM [address]   5/10/2022  2:00 PM [facility]       Zainab Holland MA

## 2022-05-05 ENCOUNTER — OFFICE VISIT (OUTPATIENT)
Dept: PODIATRY | Age: 72
End: 2022-05-05
Payer: COMMERCIAL

## 2022-05-05 VITALS — BODY MASS INDEX: 43.06 KG/M2 | HEIGHT: 62 IN | WEIGHT: 234 LBS

## 2022-05-05 DIAGNOSIS — M79.674 PAIN OF TOES OF BOTH FEET: ICD-10-CM

## 2022-05-05 DIAGNOSIS — B35.1 ONYCHOMYCOSIS OF TOENAIL: Primary | ICD-10-CM

## 2022-05-05 DIAGNOSIS — M79.675 PAIN OF TOES OF BOTH FEET: ICD-10-CM

## 2022-05-05 DIAGNOSIS — E11.51 TYPE 2 DIABETES MELLITUS WITH PERIPHERAL VASCULAR DISEASE (HCC): ICD-10-CM

## 2022-05-05 PROCEDURE — 99999 PR OFFICE/OUTPT VISIT,PROCEDURE ONLY: CPT | Performed by: PODIATRIST

## 2022-05-05 PROCEDURE — 11721 DEBRIDE NAIL 6 OR MORE: CPT | Performed by: PODIATRIST

## 2022-05-05 RX ORDER — CIPROFLOXACIN AND DEXAMETHASONE 3; 1 MG/ML; MG/ML
SUSPENSION/ DROPS AURICULAR (OTIC)
COMMUNITY
Start: 2022-04-06 | End: 2022-05-05 | Stop reason: ALTCHOICE

## 2022-05-06 ASSESSMENT — ENCOUNTER SYMPTOMS
NAUSEA: 0
DIARRHEA: 0
BACK PAIN: 0
COLOR CHANGE: 0
SHORTNESS OF BREATH: 0

## 2022-05-06 NOTE — PROGRESS NOTES
SUBJECTIVE: Rock Posadas is a 66 y.o. female who returns to the office with chief complaint of painful fungal toenails. Patient relates toe nails are thickened/difficult to trim as well as painful with ambulation and with shoe gear. Chief Complaint   Patient presents with    Nail Problem     nail trim/last saw Liz Kirkpatrick 3/1/22    Diabetes     blood sugar 168     Review of Systems   Constitutional: Negative for activity change, appetite change, chills, diaphoresis, fatigue and fever. Respiratory: Negative for shortness of breath. Cardiovascular: Negative for leg swelling. Gastrointestinal: Negative for diarrhea and nausea. Endocrine: Negative for cold intolerance, heat intolerance and polyuria. Musculoskeletal: Positive for arthralgias. Negative for back pain, gait problem, joint swelling and myalgias. Skin: Negative for color change, pallor, rash and wound. Allergic/Immunologic: Negative for environmental allergies and food allergies. Neurological: Negative for dizziness, weakness, light-headedness and numbness. Hematological: Does not bruise/bleed easily. Psychiatric/Behavioral: Negative for behavioral problems, confusion and self-injury. The patient is not nervous/anxious. OBJECTIVE: Clinical evaluation of patient reveals nails 1,2,3,4,5 of the right foot and nails 1,2,3,4,5 of the left foot to present with thickness, elongation, discoloration, brittleness, and subungual debris. There was pain with palpation and debridement of the toenails of the bilateral feet. No open lesions noted to either foot today. The right DP pulse is not palpable. The left DP pulse is palpable. The right PT pulse is not palpable. The left PT pulse is not palpable. Protective sensation is present to the right plantar foot as noted with a 5.07 [place]-Alejandra monofilament. Protective sensation is present to the left plantar foot as noted with a 5.07 [place]-Alejandra monofilament. Glucose: 168 mg/dl. Class A Findings (1 needed)   [] Non-traumatic amputation of foot or integral skeleton portion thereof. [] Q7.      Class B Findings (2 needed)   1. [x] Absent posterior tibial pulse   2. [x] Absent dorsalis pedis pulse   3. [] Advanced trophic changes; three of the following are required:   ·         [] hair growth (decrease or absence)   ·         [] nail changes (thickening)   ·         [] pigmentary changes (discoloration)   ·         [] skin texture (thin, shiny)   ·         [] skin color (rubor or redness)   [x] Q8.      Class C Findings (1 Class B, 2 Class C needed)   1. [] Claudication   2. [] Temperature changes   3. [] Edema   4. [] Paresthesia   5. [] Burning   [] Q9.     ASSESSMENT:    Diagnosis Orders   1. Onychomycosis of toenail  NE DEBRIDEMENT OF NAILS, 6 OR MORE    HM DIABETES FOOT EXAM   2. Pain of toes of both feet  NE DEBRIDEMENT OF NAILS, 6 OR MORE    HM DIABETES FOOT EXAM   3. Type 2 diabetes mellitus with peripheral vascular disease (HCC)  NE DEBRIDEMENT OF NAILS, 6 OR MORE    HM DIABETES FOOT EXAM     PLAN: Toenails 1,2,3,4,5 of the right foot and 1,2,3,4,5 of the left foot were debrided in length and thickness using a nail nipper and a . Return in about 9 weeks (around 7/7/2022) for At risk diabetic foot care.    5/5/2022      George Herrera DPM

## 2022-05-08 NOTE — PROGRESS NOTES
[facility]  Tamela Verma [facility] 2.  [address] [place] [address]  Dept: 400 Luis Duncan (:  1950) is a 66 y.o. female. Patient is here for evaluation of the following chief complaint(s):  Chief Complaint   Patient presents with    New Patient     Was in the hospital from  for breathing problems, pain on left side     Hypertension        SUBJECTIVE/OBJECTIVE:  ROSS Simmons is a 66 y.o. female patient. Patient is an established patient of Dr. Shalom Burdick . Patient has a known history of cad, CHF, Hypertension, ckd, Diabetes Mellitus, Hyperlipidemia  , Chronic Obstructive Pulmonary Disease, oxygen dependent, low back pain, anxiety depression, obesity. GERD/ CHRONIC NAUSEA  Patient was on pantoprazole, denies relief. Wants to go back to [employer], Patient was buying OTC. Will switch back and refer to  for further evaluation. Patient aware this might be temporary. Patient also c/o chronic nausea. Took Zofran before with relief. CAD/ CHF  Syed Simmons is a 66 y.o. female with a known history of CAD/ congestive heart failure. Current symptoms include: exertional chest pressure/discomfort and peripheral edema. She denies chest pain, dyspnea, irregular heart beat, and palpitations. The symptoms are aggravated by  exertion, and relieved by rest, . Patient's current treatment includes : Coreg, isordil, NTG, Plavix, Ranexa- new, . Patient is compliant all of the time with her therapy/medications. She states she is compliant with low salt diet. Patient is established with Dr. Sully Yoder, Dr. Girma Petit. Started Flexeril, prednisone, Ranexa. Lab Results   Component Value Date    LVEF 86 2022   . Lab Results   Component Value Date/Time    PROBNP 130 2022 03:10 PM    PROBNP 145 10/06/2021 08:47 AM     HYPERTENSION/ CAD  Syed Simmons has a well controlled hypertension.  she is currently on losartan. Patient's most recent BP in the office was stable. she reports stable BP readings at home. Patient denies any adverse reaction to this therapy. she denies any CP, SOB, HA, or palpitations. Patient admits to exercising and adheres to low salt diet. No history of organ damage due to condition noted. Lab Results   Component Value Date/Time    CREATININE 1.34 (H) 05/01/2022 05:36 AM     DIABETES MELLITUS  Patient has a  unstable Diabetes Mellitus. Current therapy includes trulicity, Metformin, basaglar, novolog, . Patient is responding poorly with this therapy. Patient reports home glucose monitoring as Stable readings. Patient denieshypoglycemia episodes such as{symptoms. Patient denies diarrhea. Eye Exam: due  Foot Exam:done  Lab Results   Component Value Date/Time    LABA1C 12.5 03/01/2022 10:25 AM    LABA1C 7.8 02/25/2021 10:34 AM      CHRONIC KIDNEY DISEASE  James has a stage 3a CKD. Patient is under the care of Dr Silvia Bryant  Lab Results   Component Value Date/Time    K 4.7 05/01/2022 05:36 AM    BUN 32 (H) 05/01/2022 05:36 AM    CREATININE 1.34 (H) 05/01/2022 05:36 AM    LABGLOM 39 (L) 05/01/2022 05:36 AM    GFRAA 47 (L) 05/01/2022 05:36 AM      [address] [place] [address] is currently on atorvastatin (Lipitor) Vascepa, and fenofibrate (Tricor, Trilipix). Patient denies adverse reaction to this medication. Compliance with treatment thus far has been good. The patient is known to have coexisting coronary artery disease. The CVD Risk score (D'Agostino, et al., 2008) failed to calculate for the following reasons:     The patient has a prior MI, stroke, CHF, or peripheral vascular disease diagnosis  Lab Results   Component Value Date/Time    CHOL 211 (H) 03/09/2022 10:14 AM    HDL 43 03/09/2022 10:14 AM    LDLCHOLESTEROL 128 03/09/2022 10:14 AM    CHOLHDLRATIO 4.9 03/09/2022 10:14 AM    TRIG 202 (H) 03/09/2022 10:14 AM    VLDL NOT REPORTED (H) 02/21/2022 09:35 AM     SLEEP APNEA   Patient uses CPAP for HIGINIO. Panda León is under the care of Dr. Say Yu. Patient is compliant with CPAP use. she reports success with therapy. she denies daytime fatigue. COPD/ OXYGEN DEPENDENT  Panda León has a known history of COPD. Patient is currently on symbicort, duoneb, albuterol. Patient reports fair with this therapy. Patient denies any adverse reaction to current therapy. Patient is under the care of Say Yu. Used to smoke  Oxygen 2 LPM/ sometimes [facility]. Wears at night. LOW BACK PAIN/ POLYNEUROPATHY  Chronic, in a lot of pain most of the time. Currently on Gabapentin, Oxycodone. Patient is an established patient of  Dr Antoine Guadarrama. Patient reports poor to fair relief. WEIGHT  Patient's BMI is Body mass index is 46.31 kg/m². kg/m2. BMI is increasing. Patient understands that this condition increases the patient's risk for chronic conditions. Wt Readings from Last 3 Encounters:   05/10/22 253 lb 3.2 oz (114.9 kg)   05/05/22 234 lb (106.1 kg)   04/25/22 234 lb (106.1 kg)         DEPRESSION AND ANXIETY  Changed it, Son commited suicide, she was in a bad shape, was started on celexa was being told to stop, was very upset, will try Lexapro. Carrie Lomeli reported some ongoing issues with depression and anxiety. Current therapy includes Celexa- will change to lexapro, which is working well for her. she denies adverse reaction to current therapy. she also denies suicidal/homicidal ideation, plan or intent. PHQ-2 Over the past 2 weeks, how often have you been bothered by any of the following problems? Little interest or pleasure in doing things: Not at all  Feeling down, depressed, or hopeless: Several days  PHQ-2 Score: 1  PHQ-9 Over the past 2 weeks, how often have you been bothered by any of the following problems?   Trouble falling or staying asleep, or sleeping too much: Several days (sleeping too much)  Feeling tired or having little energy: Several days  Poor appetite or overeating: Several days (overeating)  Feeling bad about yourself - or that you are a failure or have let yourself or your family down: Not at all  Trouble concentrating on things, such as reading the newspaper or watching television: Several days  Moving or speaking so slowly that other people could have noticed. Or the opposite - being so fidgety or restless that you have been moving around a lot more than usual: Not at all  Thoughts that you would be better off dead, or of hurting yourself in some way: Not at all  If you checked off any problems, how difficult have these problems made it for you to do your work, take care of things at home, or get along with other people?: Somewhat difficult  PHQ-9 Total Score: 5  PHQ-9 Total Score: 5   No flowsheet data found. VITAMIN B [address]  is  taking Vitamin B12 supplementation. Bibi Doe reports some numbness and tingling and fatigue. Lab Results   Component Value Date    RVXEGCBD51 321 10/08/2021     Lab Results   Component Value Date/Time    FOLATE 15.4 10/08/2021 06:57 AM        Medication List          Accurate as of May 10, 2022  9:12 PM. If you have any questions, ask your nurse or doctor.             START taking these medications    escitalopram 10 MG tablet  Commonly known as: LEXAPRO  Take 1 tablet by mouth daily  Started by: VERONIKA Snowden CNP     omeprazole 40 MG delayed release capsule  Commonly known as: PRILOSEC  Take 1 capsule by mouth every morning (before breakfast)  Started by: VERONIKA Santana CNP     ondansetron 4 MG tablet  Commonly known as: ZOFRAN  Take 1 tablet by mouth 3 times daily as needed for Nausea or Vomiting  Started by: VERONIKA Santana CNP        CHANGE how you take these medications    clopidogrel 75 MG tablet  Commonly known as: PLAVIX  TAKE 1 TAB BY MOUTH ONCE A DAY ( IN THE MORNING ) -THIS MEDICINE MAY BE TAKENWITH OR WITHOUT FOOD  What changed: See the new instructions. Changed by: VERONIKA Ghosh CNP     gabapentin 300 MG capsule  Commonly known as: Neurontin  Take 1 capsule by mouth 3 times daily for 30 days. What changed: when to take this     * True Metrix Blood Glucose Test strip  Generic drug: blood glucose test strips  THREE TIMES A DAY  What changed: Another medication with the same name was removed. Continue taking this medication, and follow the directions you see here. Changed by: VERONIKA Mack CNP     * OneTouch Ultra strip  Generic drug: blood glucose test strips  TEST TWO (2) TO THREE (3) TIMES A DAY & AS NEEDED FOR SYMPTOMS OF IRREGULAR BLOOD GLUCOSE  What changed: Another medication with the same name was removed. Continue taking this medication, and follow the directions you see here. Changed by: VERONIKA Mack CNP     * OneTouch Ultra strip  Generic drug: blood glucose test strips  TEST TWO (2) TO THREE (3) TIMES A DAY & AS NEEDED FOR SYMPTOMS OF IRREGULAR BLOOD GLUCOSE  What changed: Another medication with the same name was removed. Continue taking this medication, and follow the directions you see here. Changed by: VERONIKA Ghosh CNP         * This list has 3 medication(s) that are the same as other medications prescribed for you. Read the directions carefully, and ask your doctor or other care provider to review them with you.             CONTINUE taking these medications    Adjust Bath/Shower Seat/Back Misc  Use daily for shower     albuterol (2.5 MG/3ML) 0.083% nebulizer solution  Commonly known as: PROVENTIL  Take 3 mLs by nebulization every 6 hours as needed for Wheezing     aspirin 81 MG EC tablet  TAKE 1 TABLET BY MOUTH ONCE DAILY     atorvastatin 40 MG tablet  Commonly known as: Lipitor  Take 1 tablet by mouth daily     B-D SINGLE USE SWABS REGULAR Pads  THREE TIMES A DAY     Basaglar KwikPen 100 UNIT/ML injection pen  Generic drug: insulin glargine  Inject 55 Units into the skin 2 times daily     BiPAP Machine Misc     * blood glucose monitor kit and supplies  Use daily to check BS     * ONE TOUCH ULTRA 2 w/Device Kit     carvedilol 3.125 MG tablet  Commonly known as: COREG  TAKE 1 TAB BY MOUTH TWICE A DAY ( IN THE MORNING AND BEDTIME )     CertaVite/Antioxidants Tabs  TAKE 1 TABLET BY MOUTH ONCE DAILY     Compression Stockings Misc  by Does not apply route Pressure between 20 - 25 . cyclobenzaprine 5 MG tablet  Commonly known as: FLEXERIL  Take 1 tablet by mouth 2 times daily as needed for Muscle spasms     Dexcom G6 Sensor Misc  Use daily for checking blood sugars     dicyclomine 10 MG capsule  Commonly known as: BENTYL  TAKE 1 CAPSULE BY MOUTH TWICE A DAY AS NEEDED FOR ABDOMINAL SPASMS     docusate sodium 100 MG capsule  Commonly known as: COLACE  TAKE 1 CAPSULE BY MOUTH TWICE A DAY     fenofibrate 160 MG tablet  Commonly known as: TRIGLIDE  TAKE 1 TABLET BY MOUTH DAILY     FeroSul 325 (65 Fe) MG tablet  Generic drug: ferrous sulfate  TAKE 1 TAB BY MOUTH IN THE MORNING     furosemide 20 MG tablet  Commonly known as: LASIX  TAKE 1 TABLET BY MOUTH ONCE DAILY AS NEEDED     Icosapent Ethyl 1 g Caps capsule  Commonly known as: VASCEPA  TAKE 1 CAPSULE BY MOUTH TWICE A DAY     ipratropium-albuterol 0.5-2.5 (3) MG/3ML Soln nebulizer solution  Commonly known as: DUONEB  Inhale 3 mLs into the lungs every 4 hours     isosorbide dinitrate 30 MG tablet  Commonly known as: ISORDIL  TAKE 1 TABLET BY MOUTH ONCE DAILY     ketoconazole 2 % cream  Commonly known as: NIZORAL  Apply topically  2 times a day.      lidocaine 4 % cream  Commonly known as: LMX  Apply topically every 8 hrs as needed for pain     Lift Chair Misc  by Does not apply route     losartan 25 MG tablet  Commonly known as: COZAAR  TAKE 1 TABLET BY MOUTH ONCE DAILY     magnesium oxide 400 MG tablet  Commonly known as: MAG-OX     Melatonin 10 MG Tabs  Take 10 mg by mouth nightly     METAMUCIL PO     metFORMIN 1000 MG tablet  Commonly known as: GLUCOPHAGE  TAKE 1 TAB BY MOUTH TWICE A DAY ( IN THE MORNING AND BEDTIME )     nitroGLYCERIN 0.4 MG SL tablet  Commonly known as: NITROSTAT     NovoLOG FlexPen 100 UNIT/ML injection pen  Generic drug: insulin aspart  IF<139 NO INSULIN; 140-199-2 UN;200-249-4 UN;250-299-6 UN;300-349-8 UN;350-400=10 UN;ABOVE 400-12 UNIT(S)     nystatin 141017 UNIT/GM powder  Commonly known as: MYCOSTATIN  Apply 3 times daily. oxybutynin 5 MG extended release tablet  Commonly known as: DITROPAN-XL  TAKE 1 TABLET BY MOUTH DAILY     oxyCODONE-acetaminophen 5-325 MG per tablet  Commonly known as: Percocet  Take 1 tablet by mouth See Admin Instructions for 30 days. Intended supply: 30 days. 1 tab 3-4 times a day prn     OXYGEN     predniSONE 20 MG tablet  Commonly known as: DELTASONE     ranolazine 500 MG extended release tablet  Commonly known as: RANEXA  Take 1 tablet by mouth 2 times daily for 14 days     Symbicort 160-4.5 MCG/ACT Aero  Generic drug: budesonide-formoterol     topiramate 100 MG tablet  Commonly known as: TOPAMAX  TAKE 1 TABLET BY MOUTH NIGHTLY     Trulicity 9.66 MV/7.6QL Sopn  Generic drug: Dulaglutide  Inject 0.75 mg into the skin every 7 days If pen needle is needed, please request     UltiCare Mini Pen Needles 31G X 6 MM Misc  Generic drug: Insulin Pen Needle  USE AS DIRECTED     vitamin B-12 100 MCG tablet  Commonly known as: CYANOCOBALAMIN  take half a tablet BY MOUTH ONCE DAILY     zoster recombinant adjuvanted vaccine 50 MCG/0.5ML Susr injection  Commonly known as: Shingrix  50 MCG IM then repeat 2-6 months. * This list has 2 medication(s) that are the same as other medications prescribed for you. Read the directions carefully, and ask your doctor or other care provider to review them with you.             STOP taking these medications    citalopram 20 MG tablet  Commonly known as: CELEXA  Stopped by: Jacqueline Cheung APRN - CNP     pantoprazole 40 MG tablet  Commonly known as: PROTONIX  Stopped by: Jacqueline Cheung, VERONIKA - CNP           Where to Get Your Medications      These medications were sent to [address], [address]  [facility], [address] [place]  [address]    Phone: [phone]   · escitalopram 10 MG tablet  · furosemide 20 MG tablet  · omeprazole 40 MG delayed release capsule  · ondansetron 4 MG tablet     Information about where to get these medications is not yet available    Ask your nurse or doctor about these medications  · clopidogrel 75 MG tablet           DEPRESSION SCREENING: Negative  PHQ Scores 5/10/2022 3/1/2022 2/25/2021 6/25/2020 9/24/2019 3/7/2019 2/14/2018   PHQ2 Score 1 2 0 1 1 2 0   PHQ9 Score 5 2 0 1 1 2 0     WEIGHT  Patient's BMI is Body mass index is 46.31 kg/m². kg/m2. BMI is increasing. Patient understands that this condition increases the patient's risk for chronic conditions. Wt Readings from Last 3 Encounters:   05/10/22 253 lb 3.2 oz (114.9 kg)   05/05/22 234 lb (106.1 kg)   04/25/22 234 lb (106.1 kg)       VITAL SIGNS:  Vitals:    05/10/22 1403   BP: 108/62   Pulse: 94   Temp: 98.4 °F (36.9 °C)   SpO2: 94%   Weight: 253 lb 3.2 oz (114.9 kg)   Height: 5' 2\" (1.575 m)   Estimated body mass index is 46.31 kg/m² as calculated from the following:    Height as of this encounter: 5' 2\" (1.575 m). Weight as of this encounter: 253 lb 3.2 oz (114.9 kg). Review of Systems   Constitutional: Positive for activity change, fatigue and unexpected weight change. Negative for chills and fever. HENT: Negative. Respiratory: Positive for apnea (sleep apnea), chest tightness (on oxygen), shortness of breath (on exertion) and wheezing. Cardiovascular: Positive for leg swelling. Negative for chest pain and palpitations. Gastrointestinal: Positive for abdominal distention and nausea. Negative for abdominal pain. Endocrine: Negative. Musculoskeletal: Positive for arthralgias, back pain, gait problem and joint swelling. Allergic/Immunologic: Positive for environmental allergies. Neurological: Positive for numbness. Negative for headaches. Psychiatric/Behavioral: Negative for sleep disturbance and suicidal ideas. The patient is nervous/anxious. Physical Exam    MEDICAL HISTORY      Diagnosis Date    Abdominal bloating 1/26/2021    Adenomatous polyp of ascending colon 1/26/2021    Allergic rhinitis     Anxiety 7/17/2013    Arthritis     Asthma     Atrial fibrillation ([facility])     Back pain     NERVE/DR. GRACIA    Benign hypertension with CKD (chronic kidney disease) stage III (formerly [facility])     CAD (coronary artery disease) 3/21/2013    Caffeine use     2 coffee/day    CHF (congestive heart failure) ([facility])     Chronic kidney disease     CKD (chronic kidney disease) stage 3, GFR 30-59 ml/min ([facility])     COPD (chronic obstructive pulmonary disease) ([facility])     emphysema    Degeneration of lumbar or lumbosacral intervertebral disc     Depression     DM (diabetes mellitus) ([facility])     Emphysema of lung ([facility])     Gastritis     GERD (gastroesophageal reflux disease)     Hearing loss     Hematuria     Hiatal hernia     HTN (hypertension), benign 6/28/2014    Hypertriglyceridemia     Incontinence of urine 9/25/2013    Irritable bowel syndrome with both constipation and diarrhea 1/26/2021    Knee arthropathy     Lumbosacral spondylosis without myelopathy 9/29/2014    Migraine headache     DR. GRACIA    Mumps     Neuropathy     Obesity     On home oxygen therapy     2 L PER NC  HS AND PRN    HIGINIO on CPAP     Otitis media 1-26-15    Secondary diabetes mellitus with stage 3 chronic kidney disease (GFR 30-59) (formerly [facility])     Stroke ([facility] [address]      mini stroke.     Tinnitus     Type 2 diabetes mellitus without complication (formerly [facility])     Type II or unspecified type diabetes mellitus without mention of complication, not stated as uncontrolled     UTI (lower urinary tract infection)     Varicose vein       MEDICATIONS  Prior to Visit Medications    Medication Sig Taking?  Authorizing Provider   predniSONE (DELTASONE) 20 MG tablet Take 20 mg by mouth daily Yes Historical Provider, MD   omeprazole (PRILOSEC) 40 MG delayed release capsule Take 1 capsule by mouth every morning (before breakfast) Yes VERONIKA Snowden CNP   escitalopram (LEXAPRO) 10 MG tablet Take 1 tablet by mouth daily Yes VERONIKA Snowden CNP   ondansetron (ZOFRAN) 4 MG tablet Take 1 tablet by mouth 3 times daily as needed for Nausea or Vomiting Yes VERONIKA Snowden CNP   furosemide (LASIX) 20 MG tablet TAKE 1 TABLET BY MOUTH ONCE DAILY AS NEEDED Yes VERONIKA Snowden CNP   clopidogrel (PLAVIX) 75 MG tablet TAKE 1 TAB BY MOUTH ONCE A DAY ( IN THE MORNING ) -THIS MEDICINE MAY BE TAKENWITH OR WITHOUT FOOD Yes VERONIKA Snowden CNP   cyclobenzaprine (FLEXERIL) 5 MG tablet Take 1 tablet by mouth 2 times daily as needed for Muscle spasms Yes Travon Blanchard DO   FEROSUL 325 (65 Fe) MG tablet TAKE 1 TAB BY MOUTH IN THE MORNING Yes Kandy Hale MD   insulin aspart (NOVOLOG FLEXPEN) 100 UNIT/ML injection pen IF<139 NO INSULIN; 140-199-2 UN;200-249-4 UN;250-299-6 AIDEE;327-285-4 UN;350-400=10 UN;ABOVE 400-12 UNIT(S) Yes Kandy Hale MD   Dulaglutide (TRULICITY) 3.31 JX/7.3PX SOPN Inject 0.75 mg into the skin every 7 days If pen needle is needed, please request Yes Kandy Hale MD   Continuous Blood Gluc Sensor (DEXCOM G6 SENSOR) MISC Use daily for checking blood sugars Yes Kandy Hale MD   losartan (COZAAR) 25 MG tablet TAKE 1 TABLET BY MOUTH ONCE DAILY Yes Kandy Hale MD   docusate sodium (COLACE) 100 MG capsule TAKE 1 CAPSULE BY MOUTH TWICE A DAY Yes Kandy Hale MD   dicyclomine (BENTYL) 10 MG capsule TAKE 1 CAPSULE BY MOUTH TWICE A DAY AS NEEDED FOR ABDOMINAL SPASMS Yes Arjun Joe MD   blood glucose test strips (ONETOUCH ULTRA) strip TEST TWO (2) TO THREE (3) TIMES A DAY & AS NEEDED FOR SYMPTOMS OF IRREGULAR BLOOD GLUCOSE Yes Blayne Chambers MD   Blood Glucose Monitoring Suppl (ONE TOUCH ULTRA 2) w/Device KIT  Yes Historical Provider, MD   atorvastatin (LIPITOR) 40 MG tablet Take 1 tablet by mouth daily Yes Blayne Chambers MD   BiPAP Machine MISC repair/replace Bipap maintain 18/9CMH2O, Cflex=3,mask,tubing,filters,headgear,heated humidifier,all supplies PRN Yes Historical Provider, MD   blood glucose test strips (TRUE METRIX BLOOD GLUCOSE TEST) strip THREE TIMES A DAY Yes Blayne Chambers MD   albuterol (PROVENTIL) (2.5 MG/3ML) 0.083% nebulizer solution Take 3 mLs by nebulization every 6 hours as needed for Wheezing Yes Radha Cardenas MD   gabapentin (NEURONTIN) 300 MG capsule Take 1 capsule by mouth 3 times daily for 30 days. Patient taking differently: Take 300 mg by mouth 2 times daily. Yes VERONIKA Pacheco CNP   Lift Chair MISC by Does not apply route Yes Blayne Chambers MD   Alcohol Swabs (B-D SINGLE USE SWABS REGULAR) [place] Yes Blayne Chambers MD   Blood Glucose Monitoring Suppl HARMEET Use daily to check BS Yes Blayne Chambers MD   Compression Stockings MISC by Does not apply route Pressure between 20 - 25 . Yes Blayne Chambers MD   oxyCODONE-acetaminophen (PERCOCET) 5-325 MG per tablet Take 1 tablet by mouth See Admin Instructions for 30 days. Intended supply: 30 days.  1 tab 3-4 times a day prn  VERONIKA Chavez CNP   ranolazine (RANEXA) 500 MG extended release tablet Take 1 tablet by mouth 2 times daily for 14 days  Travon Blanchard, DO   ONETOUCH ULTRA strip TEST TWO (2) TO THREE (3) TIMES A DAY & AS NEEDED FOR SYMPTOMS OF IRREGULAR BLOOD GLUCOSE  Blayne Chambers MD   oxybutynin (DITROPAN-XL) 5 MG extended release tablet TAKE 1 TABLET BY MOUTH DAILY  Blayne Chambers MD   fenofibrate (TRIGLIDE) 160 MG tablet TAKE 1 TABLET BY MOUTH DAILY  Blayne Chambers MD   magnesium oxide (MAG-OX) 400 MG tablet   Historical Provider, MD   zoster recombinant adjuvanted vaccine (SHINGRIX) 50 MCG/0.5ML SUSR injection 50 MCG IM then repeat 2-6 months. Melvin Mejia MD   insulin glargine [facility]) 100 UNIT/ML injection pen Inject 55 Units into the skin 2 times daily  Melvin Mejia MD   carvedilol (COREG) 3.125 MG tablet TAKE 1 TAB BY MOUTH TWICE A DAY ( IN THE MORNING AND BEDTIME )  Melvin Mejia MD   ketoconazole (NIZORAL) 2 % cream Apply topically  2 times a day. Melvin Mejia MD   aspirin 81 MG EC tablet TAKE 1 TABLET BY MOUTH ONCE DAILY  Melvin Mejia MD   Multiple Vitamins-Minerals (CERTAVITE/ANTIOXIDANTS) TABS TAKE 1 TABLET BY MOUTH ONCE DAILY  Melvin Mejia MD   isosorbide dinitrate (ISORDIL) 30 MG tablet TAKE 1 TABLET BY MOUTH ONCE DAILY  Melvin Mejia MD   vitamin B-12 (CYANOCOBALAMIN) 100 MCG tablet take half a tablet BY MOUTH ONCE DAILY  Duke Mcintyre MD   Icosapent Ethyl (VASCEPA) 1 g CAPS capsule TAKE 1 CAPSULE BY MOUTH TWICE A DAY  Anaya Hill MD   topiramate (TOPAMAX) 100 MG tablet TAKE 1 TABLET BY MOUTH NIGHTLY   Hortencia Paredes MD   metFORMIN (GLUCOPHAGE) 1000 MG tablet TAKE 1 TAB BY MOUTH TWICE A DAY ( IN THE MORNING AND BEDTIME )   Melvin Mejia MD   Psyllium (METAMUCIL PO) Take by mouth 3 times daily Takes powder  Historical Provider, MD Ahmet Vicente MINI PEN NEEDLES 31G X 6 MM MISC USE AS DIRECTED   Melvin Mejia MD   nystatin (MYCOSTATIN) 279652 UNIT/GM powder Apply 3 times daily. Melvin Mejia MD   lidocaine (LMX) 4 % cream Apply topically every 8 hrs as needed for pain  Melvin Mejia MD   Misc.  Devices (ADJUST BATH/SHOWER SEAT/BACK) MISC Use daily for shower  Melvin Mejia MD   nitroGLYCERIN (NITROSTAT) 0.4 MG SL tablet 1 under the tongue as needed for angina, may repeat q5mins for up three doses  Historical Provider, MD   ipratropium-albuterol (DUONEB) 0.5-2.5 (3) MG/3ML SOLN nebulizer solution Inhale 3 mLs into the lungs every 4 hours  Robert Reynoso MD   Melatonin 10 MG TABS Take 10 mg by mouth nightly  Melvin Mejia MD   SYMBICORT 160-4.5 MCG/ACT AERO   2 puffs As needed for sob  Historical Provider, MD   OXYGEN Inhale 3 L into the lungs   Historical Provider, MD     Controlled Substance Monitoring:  Acute and Chronic Pain Monitoring:   RX Monitoring 5/9/2022   Attestation -   Acute Pain Prescriptions -   Periodic Controlled Substance Monitoring Possible medication side effects, risk of tolerance/dependence & alternative treatments discussed. ;No signs of potential drug abuse or diversion identified. ;Assessed functional status. ;Obtaining appropriate analgesic effect of treatment. Chronic Pain > 50 MEDD -   Chronic Pain > 80 MEDD -     ASSESSMENT/PLAN:  1. Coronary artery disease due to lipid rich plaque  Failure to Improve  DISCUSSED and ADVISED TO:  Discussed serious causes of CP. Advised to go to the ER for worsening CP/SOB         - clopidogrel (PLAVIX) 75 MG tablet; TAKE 1 TAB BY MOUTH ONCE A DAY ( IN THE MORNING ) -THIS MEDICINE MAY BE TAKENWITH OR WITHOUT FOOD  Dispense: 90 tablet; Refill: 2    2. Chronic diastolic congestive heart failure (HCC)  Failure to Improve  Continue with the therapy. DISCUSSED and ADVISED TO:  Weigh daily and log. Keep regular activity as tolerated. Cut down on salt intake. Keep appointment with the cardiologist.  Go to the ER for CP and SOB not relieved with rest.      3. Essential hypertension  Stable  Continue current therapy. DISCUSSED AND ADVISED TO:  Cut down on your salt intake. Cut down on caffeinated drinks, sports drinks. Instructed to check BP at home regularly. Report for any chest pains, shortness of breath, headaches, and lightheadedness. Call the office if your blood pressure continue to be higher than 140/90 or 90/50.        4. Benign hypertension with CKD (chronic kidney disease) stage III (HCC)  Failure to Improve  Continue renal consult  Advised to increase fluid intake  Report decrease urine output  Decrease salt intake      5.  Type 2 diabetes mellitus with diabetic polyneuropathy, with long-term current use of insulin (HCC)  Worsening  Continue therapy. We will continue to monitor Hemoglobin A1c   DISCUSSED and ADVISED TO:  Continue to check Glucose levels at home. Report increased and low levels as discussed. Decrease carbohydrates, sugary drinks, desserts in your diet. Exercise regularly, as tolerated. Try to lose weight. 6. Mixed hyperlipidemia  Stable  Continue therapy. Advised to decrease the consumption of red meats, fried foods, trans fats, sweets, sugary beverages. Advised to increase fish, vegetables, and fruits consumption. Advised to add fiber or OTC supplements in diet. Discussed weight loss which will result in improvement of lipids levels. Advised to increase daily physical activities and add regular exercises. 7. Asthma with COPD ([facility] [address]  Failure to Improve  Current treatment plan is effective, no change in therapy. Reviewed use, techniques, schedule and side effects of all inhaled medications  Critical need for compliance with treatment plan to achieve optimal results  Very strongly urged to quit smoking to reduce pulmonary and CVD risk. 8. Lumbar radiculopathy, chronic  Failure to Improve  Continue current therapy. DISCUSSED AND ADVISED TO:  Use heat packs 15 to 20 mins every 2-3 hours. Do some back stretches as tolerated. Refer to hand out for instructions. Call for worsening, numbness, weakness. 9. Polyneuropathy due to type 2 diabetes mellitus ([facility] [address]  Failure to Improve  Continue current therapy  Encouraged to maintain glucose levels  Encouraged to wear shoes all the time  Yearly foot exam      10. Anxiety and depression  Worsening  Stop celexa   Start lexapro  DISCUSSED and ADVISED TO:  Not stopping medication suddenly. See the specialist as discussed. Report for feelings of SI, HI, and hallucinations. Go to the ER for increasing urge to hurt yourself. - escitalopram (LEXAPRO) 10 MG tablet;  Take 1 tablet by mouth daily  Dispense: 30 tablet; Refill: 1    11. Gastroesophageal reflux disease with esophagitis without hemorrhage  Worsening  Stop protonix- patient choice  Start prilosec- patient choice  DISCUSSED AND ADVISED TO:  Avoid food triggers. Stop eating large meals close to bedtime  Don't eat meals too close to bedtime  Avoid ASA, NSAID's, caffeine, peppermints, alcohol and tobacco.  Report for worsening symptoms. - omeprazole (PRILOSEC) 40 MG delayed release capsule; Take 1 capsule by mouth every morning (before breakfast)  Dispense: 30 capsule; Refill: 1  - Mauro Lindo MD, Gastroenterology, [place]    12. Chronic nausea  Failure to Improve  DISCUSSED and ADVISED TO:  Drink plenty of fluids, enough until urine is light yellow or clear like water. Choose water and other caffeine-free clear liquids. If you do not feel like eating or drinking, try taking small sips of water, sports drinks, or other rehydration drinks. Get plenty of rest.  Go to the Emergency Room if you are not able to tolerate any food or water. - ondansetron (ZOFRAN) 4 MG tablet; Take 1 tablet by mouth 3 times daily as needed for Nausea or Vomiting  Dispense: 30 tablet; Refill: 1  - Mauro Lindo MD, Gastroenterology, [place]    24. Oxygen dependent  Stable  Continue current therapy      14. Morbid obesity with alveolar hypoventilation (HCC)  Failure to Improve  BMI increasing  DISCUSSED AND ADVISED TO:  Eat a low-fat and low carbohydrates diet. Avoid fried foods especially fast food. Choose healthier options for snacks. Have 5-6 servings of fruits and vegetables per day. Cut down on eating processed food. Add 30 minutes to 1 hour aerobic exercise for 3-4 days a week. On this date 5/10/2022 I have spent 45 minutes reviewing previous notes, test results and face to face with the patient discussing the diagnosis and importance of compliance with the treatment plan as well as documenting on the day of the visit.     Return in about 4 weeks (around 6/7/2022) for  Dep/Anxiety, 30mins, DM/A1c. This note was completed by using the assistance of a speech-recognition program. However, inadvertent computerized transcription errors may be present. Although every effort was made to ensure accuracy, no guarantees can be provided that every mistake has been identified and corrected by editing.   Electronically signed by VERONIKA Phillip CNP on 5/7/22 at 11:48 PM EDT     --VERONIKA Phillip CNP

## 2022-05-09 ENCOUNTER — HOSPITAL ENCOUNTER (OUTPATIENT)
Dept: PAIN MANAGEMENT | Age: 72
Discharge: HOME | End: 2022-05-09
Payer: COMMERCIAL

## 2022-05-09 VITALS
TEMPERATURE: 98.6 F | HEART RATE: 80 BPM | OXYGEN SATURATION: 91 % | SYSTOLIC BLOOD PRESSURE: 143 MMHG | DIASTOLIC BLOOD PRESSURE: 55 MMHG

## 2022-05-09 DIAGNOSIS — M47.817 LUMBOSACRAL SPONDYLOSIS WITHOUT MYELOPATHY: Chronic | ICD-10-CM

## 2022-05-09 DIAGNOSIS — M46.1 SACROILIITIS, NOT ELSEWHERE CLASSIFIED (HCC): Chronic | ICD-10-CM

## 2022-05-09 DIAGNOSIS — M54.16 LUMBAR RADICULOPATHY, CHRONIC: ICD-10-CM

## 2022-05-09 DIAGNOSIS — M50.30 DDD (DEGENERATIVE DISC DISEASE), CERVICAL: Chronic | ICD-10-CM

## 2022-05-09 DIAGNOSIS — F11.90 CHRONIC, CONTINUOUS USE OF OPIOIDS: ICD-10-CM

## 2022-05-09 DIAGNOSIS — M51.36 DDD (DEGENERATIVE DISC DISEASE), LUMBAR: Primary | ICD-10-CM

## 2022-05-09 PROCEDURE — 99213 OFFICE O/P EST LOW 20 MIN: CPT | Performed by: NURSE PRACTITIONER

## 2022-05-09 PROCEDURE — 99213 OFFICE O/P EST LOW 20 MIN: CPT

## 2022-05-09 RX ORDER — OXYCODONE HYDROCHLORIDE AND ACETAMINOPHEN 5; 325 MG/1; MG/1
1 TABLET ORAL SEE ADMIN INSTRUCTIONS
Qty: 100 TABLET | Refills: 0 | Status: SHIPPED | OUTPATIENT
Start: 2022-05-09 | End: 2022-06-02 | Stop reason: SDUPTHER

## 2022-05-09 ASSESSMENT — ENCOUNTER SYMPTOMS
CONSTIPATION: 0
BACK PAIN: 1
SHORTNESS OF BREATH: 1
BOWEL INCONTINENCE: 0
COUGH: 0

## 2022-05-09 ASSESSMENT — PAIN SCALES - GENERAL: PAINLEVEL_OUTOF10: 7

## 2022-05-09 NOTE — PROGRESS NOTES
Chief Complaint   Patient presents with    Back Pain    Medication Refill     [facility]     Pt c/o low back pain that radiates down legs. MRI done in 10- with multilevel degenerative changes and Right paracentral disc extrusion L4-5 narrowing the right lateral recess. She has never had a lumbar surgery and not interested in seeing NS for opinion. She is dependant on her scooter to get around - can walk very short distances. Working on weight loss and reports recent 20lb loss over last 2 months. Pt has had injections but refuses to repeat d/t exreme pain during injections. Recent admission for COPD    HPI:     Back Pain  This is a chronic problem. The current episode started more than 1 year ago. The problem occurs constantly. The problem is unchanged. The pain is present in the lumbar spine. The quality of the pain is described as shooting. The pain radiates to the left thigh and right thigh. The pain is at a severity of 7/10. The pain is moderate. The pain is the same all the time. Stiffness is present in the morning. Associated symptoms include bladder incontinence and numbness (occ.). Pertinent negatives include no bowel incontinence, chest pain, fever, headaches, tingling or weakness. Risk factors include menopause, obesity, poor posture, sedentary lifestyle and lack of exercise. She has tried bed rest, muscle relaxant and analgesics (physical therapy) for the symptoms. The treatment provided mild relief. Patient denies any new neurological symptoms. No bowel or bladder incontinence, no weakness, and no falling.     Pill count: appropriate Oxycodone/1 due today but was in hospital for 6 days states took while she was in hospital     Morphine equivalent: 25    Controlled Substance Monitoring:    Acute and Chronic Pain Monitoring:   RX Monitoring 5/9/2022   Attestation -   Acute Pain Prescriptions -   Periodic Controlled Substance Monitoring Possible medication side effects, risk of tolerance/dependence & alternative treatments discussed. ;No signs of potential drug abuse or diversion identified. ;Assessed functional status. ;Obtaining appropriate analgesic effect of treatment. Chronic Pain > 50 MEDD -   Chronic Pain > 80 MEDD -         Periodic Controlled Substance Monitoring: Possible medication side effects, risk of tolerance/dependence & alternative treatments discussed. ,No signs of potential drug abuse or diversion identified. ,Assessed functional status. ,Obtaining appropriate analgesic effect of treatment. Tang Wilkerson APRN - CNP)      Past Medical History:   Diagnosis Date    Abdominal bloating 1/26/2021    Adenomatous polyp of ascending colon 1/26/2021    Allergic rhinitis     Anxiety 7/17/2013    Arthritis     Asthma     Atrial fibrillation (HCC)     Back pain     NERVE/DR. GRACIA    Benign hypertension with CKD (chronic kidney disease) stage III ([facility])     CAD (coronary artery disease) 3/21/2013    Caffeine use     2 coffee/day    CHF (congestive heart failure) ([facility])     Chronic kidney disease     CKD (chronic kidney disease) stage 3, GFR 30-59 ml/min ([facility])     COPD (chronic obstructive pulmonary disease) ([facility])     emphysema    Degeneration of lumbar or lumbosacral intervertebral disc     Depression     DM (diabetes mellitus) ([facility])     Emphysema of lung ([facility])     Gastritis     GERD (gastroesophageal reflux disease)     Hearing loss     Hematuria     Hiatal hernia     HTN (hypertension), benign 6/28/2014    Hypertriglyceridemia     Incontinence of urine 9/25/2013    Irritable bowel syndrome with both constipation and diarrhea 1/26/2021    Knee arthropathy     Lumbosacral spondylosis without myelopathy 9/29/2014    Migraine headache     DR. GRACIA    Mumps     Neuropathy     Obesity     On home oxygen therapy     2 L PER NC  HS AND PRN    HIGINIO on CPAP     Otitis media 1-26-15    Secondary diabetes mellitus with stage 3 chronic kidney disease (GFR 30-59) ([facility] [address]     [facility]      mini stroke.  Tinnitus     Type 2 diabetes mellitus without complication (HCC)     Type II or unspecified type diabetes mellitus without mention of complication, not stated as uncontrolled     UTI (lower urinary tract infection)     Varicose vein        Past Surgical History:   Procedure Laterality Date    ABLATION OF DYSRHYTHMIC FOCUS  2000    CARPAL TUNNEL RELEASE Right     CATARACT REMOVAL WITH IMPLANT Bilateral     CHOLECYSTECTOMY  2007    COLONOSCOPY      COLONOSCOPY  04/10/2017    tubular adenomo polyp , mod. sigmoid diverticulosis, min. int. hemorrhoids    COLONOSCOPY N/A 3/2/2021    COLONOSCOPY WITH BIOPSY performed by Janette Hood MD at [place] [address]  9/25/2013    DILATION AND CURETTAGE  1979    EYE SURGERY      laser    INCONTINENCE SURGERY      Percutaneous nerve stimulation Dr Toñito Lozano Nov 2013     INSERTABLE CARDIAC MONITOR  12/04/2015    [employer] REVEAL LINQ MODEL #[id number] MRI CONDTIONAL OK 3T. IMMEDIATELY POST IMPLANT    OTHER SURGICAL HISTORY  09/10/15    removal of interstim    SD COLSC FLX W/REMOVAL LESION BY HOT BX FORCEPS N/A 4/10/2017    COLONOSCOPY POLYPECTOMY HOT BIOPSY performed by Greg Butt MD at [address] [place] [address]      TYMPANOPLASTY      TYMPANOPLASTY      TYMPANOSTOMY TUBE PLACEMENT  08/21/2017    UPPER GASTROINTESTINAL ENDOSCOPY  03/2016    Dr Cammy Bonilla N/A 3/2/2021    EGD BIOPSY performed by Janette Hood MD at [facility]       Allergies   Allergen Reactions    Robitussin [Guaifenesin] Anaphylaxis    Codeine Swelling    Compazine [Prochlorperazine Maleate] Hives and Swelling    Iodides Itching    Morphine Other (See Comments)     hallucinations    Moxifloxacin      Other reaction(s): Intolerance-unknown  Other reaction(s):  Intolerance-unknown    Reglan [Metoclopramide] Nausea Only    Avelox [Moxifloxacin Hcl In Nacl] Rash     Dermatitis      Cipro Xr Rash     dermatitis    Sulfa Antibiotics Rash         Current Outpatient Medications:     oxyCODONE-acetaminophen (PERCOCET) 5-325 MG per tablet, Take 1 tablet by mouth See Admin Instructions for 30 days. Intended supply: 30 days.  1 tab 3-4 times a day prn, Disp: 100 tablet, Rfl: 0    ranolazine (RANEXA) 500 MG extended release tablet, Take 1 tablet by mouth 2 times daily for 14 days, Disp: 28 tablet, Rfl: 0    cyclobenzaprine (FLEXERIL) 5 MG tablet, Take 1 tablet by mouth 2 times daily as needed for Muscle spasms, Disp: 20 tablet, Rfl: 0    FEROSUL 325 (65 Fe) MG tablet, TAKE 1 TAB BY MOUTH IN THE MORNING, Disp: 30 tablet, Rfl: 0    ONETOUCH ULTRA strip, TEST TWO (2) TO THREE (3) TIMES A DAY & AS NEEDED FOR SYMPTOMS OF IRREGULAR BLOOD GLUCOSE, Disp: 100 strip, Rfl: 0    pantoprazole (PROTONIX) 40 MG tablet, TAKE 1 TABLET BY MOUTH TWICE A DAY, Disp: 60 tablet, Rfl: 5    oxybutynin (DITROPAN-XL) 5 MG extended release tablet, TAKE 1 TABLET BY MOUTH DAILY, Disp: 30 tablet, Rfl: 3    fenofibrate (TRIGLIDE) 160 MG tablet, TAKE 1 TABLET BY MOUTH DAILY, Disp: 90 tablet, Rfl: 1    insulin aspart (NOVOLOG FLEXPEN) 100 UNIT/ML injection pen, IF<139 NO INSULIN; 140-199-2 UN;200-249-4 UN;250-299-6 UN;300-349-8 UN;350-400=10 UN;ABOVE 400-12 UNIT(S), Disp: 10 mL, Rfl: 3    magnesium oxide (MAG-OX) 400 MG tablet, , Disp: , Rfl:     zoster recombinant adjuvanted vaccine (SHINGRIX) 50 MCG/0.5ML SUSR injection, 50 MCG IM then repeat 2-6 months., Disp: 0.5 mL, Rfl: 1    insulin glargine (BASAGLAR KWIKPEN) 100 UNIT/ML injection pen, Inject 55 Units into the skin 2 times daily, Disp: 10 mL, Rfl: 2    Dulaglutide (TRULICITY) 3.38 ZG/1.7QZ SOPN, Inject 0.75 mg into the skin every 7 days If pen needle is needed, please request, Disp: 4 pen, Rfl: 5    Continuous Blood Gluc Sensor (DEXCOM G6 SENSOR) MISC, Use daily for checking blood sugars, Disp: 1 each, Rfl: 2    losartan (COZAAR) 25 MG tablet, TAKE 1 TABLET BY MOUTH ONCE DAILY, Disp: 30 tablet, Rfl: 9    carvedilol (COREG) 3.125 MG tablet, TAKE 1 TAB BY MOUTH TWICE A DAY ( IN THE MORNING AND BEDTIME ), Disp: 180 tablet, Rfl: 3    ketoconazole (NIZORAL) 2 % cream, Apply topically  2 times a day., Disp: 1 each, Rfl: 0    citalopram (CELEXA) 20 MG tablet, TAKE 1 TABLET BY MOUTH DAILY NEEDS TO DECREASE THE CELEXA TO 20 MG DUE TO SIDE EFFECTS ON THE HEART, Disp: 30 tablet, Rfl: 5    aspirin 81 MG EC tablet, TAKE 1 TABLET BY MOUTH ONCE DAILY, Disp: 90 tablet, Rfl: 1    docusate sodium (COLACE) 100 MG capsule, TAKE 1 CAPSULE BY MOUTH TWICE A DAY, Disp: 60 capsule, Rfl: 5    Multiple Vitamins-Minerals (CERTAVITE/ANTIOXIDANTS) TABS, TAKE 1 TABLET BY MOUTH ONCE DAILY, Disp: 30 tablet, Rfl: 5    isosorbide dinitrate (ISORDIL) 30 MG tablet, TAKE 1 TABLET BY MOUTH ONCE DAILY, Disp: 30 tablet, Rfl: 5    vitamin B-12 (CYANOCOBALAMIN) 100 MCG tablet, take half a tablet BY MOUTH ONCE DAILY, Disp: 30 tablet, Rfl: 0    furosemide (LASIX) 20 MG tablet, TAKE 1 TABLET BY MOUTH ONCE DAILY AS NEEDED, Disp: 30 tablet, Rfl: 0    Icosapent Ethyl (VASCEPA) 1 g CAPS capsule, TAKE 1 CAPSULE BY MOUTH TWICE A DAY, Disp: 60 capsule, Rfl: 0    dicyclomine (BENTYL) 10 MG capsule, TAKE 1 CAPSULE BY MOUTH TWICE A DAY AS NEEDED FOR ABDOMINAL SPASMS, Disp: 60 capsule, Rfl: 0    topiramate (TOPAMAX) 100 MG tablet, TAKE 1 TABLET BY MOUTH NIGHTLY , Disp: 90 tablet, Rfl: 2    clopidogrel (PLAVIX) 75 MG tablet, TAKE 1 TAB BY MOUTH ONCE A DAY ( IN THE MORNING ) -THIS MEDICINE MAY BE TAKENWITH OR WITHOUT FOOD , Disp: 90 tablet, Rfl: 1    metFORMIN (GLUCOPHAGE) 1000 MG tablet, TAKE 1 TAB BY MOUTH TWICE A DAY ( IN THE MORNING AND BEDTIME ) , Disp: 180 tablet, Rfl: 3    blood glucose test strips (ONETOUCH ULTRA) strip, TEST TWO (2) TO THREE (3) TIMES A DAY & AS NEEDED FOR SYMPTOMS OF IRREGULAR BLOOD GLUCOSE, Disp: 100 strip, Rfl: 0    Blood Glucose Monitoring Suppl (ONE TOUCH ULTRA 2) w/Device KIT, , Disp: , Rfl:     ONETOUCH ULTRA strip, TEST TWO (2) TO THREE (3) TIMES A DAY& AS NEEDED , Disp: 100 each, Rfl: 0    atorvastatin (LIPITOR) 40 MG tablet, Take 1 tablet by mouth daily, Disp: 90 tablet, Rfl: 3    BiPAP Machine MISC, repair/replace Bipap maintain 18/9CMH2O, Cflex=3,mask,tubing,filters,headgear,heated humidifier,all supplies PRN, Disp: , Rfl:     Psyllium (METAMUCIL PO), Take by mouth 3 times daily Takes powder, Disp: , Rfl:     blood glucose test strips (TRUE METRIX BLOOD GLUCOSE TEST) strip, THREE TIMES A DAY, Disp: 100 each, Rfl: 3    albuterol (PROVENTIL) (2.5 MG/3ML) 0.083% nebulizer solution, Take 3 mLs by nebulization every 6 hours as needed for Wheezing, Disp: 120 each, Rfl: 3    ULTICARE MINI PEN NEEDLES 31G X 6 MM MISC, USE AS DIRECTED , Disp: 100 each, Rfl: 5    gabapentin (NEURONTIN) 300 MG capsule, Take 1 capsule by mouth 3 times daily for 30 days. (Patient taking differently: Take 300 mg by mouth 2 times daily. ), Disp: 90 capsule, Rfl: 2    nystatin (MYCOSTATIN) 084584 UNIT/GM powder, Apply 3 times daily. , Disp: 3 Bottle, Rfl: 3    lidocaine (LMX) 4 % cream, Apply topically every 8 hrs as needed for pain, Disp: 45 g, Rfl: 3    Lift Chair MISC, by Does not apply route, Disp: 1 each, Rfl: 0    Misc.  Devices (ADJUST BATH/SHOWER SEAT/BACK) MISC, Use daily for shower, Disp: 1 each, Rfl: 0    Alcohol Swabs (B-D SINGLE USE SWABS REGULAR) PADS, THREE TIMES A DAY, Disp: 100 each, Rfl: 0    nitroGLYCERIN (NITROSTAT) 0.4 MG SL tablet, 1 under the tongue as needed for angina, may repeat q5mins for up three doses, Disp: , Rfl:     Blood Glucose Monitoring Suppl HARMEET, Use daily to check BS, Disp: 1 Device, Rfl: 0    ipratropium-albuterol (DUONEB) 0.5-2.5 (3) MG/3ML SOLN nebulizer solution, Inhale 3 mLs into the lungs every 4 hours, Disp: 360 mL, Rfl: 2    Melatonin 10 MG TABS, Take 10 mg by mouth nightly, Disp: 90 tablet, Rfl: 3    Compression Stockings MISC, by Does not apply route Pressure between 21 - 25 ., Disp: 1 each, Rfl: 0    SYMBICORT 160-4.5 MCG/ACT AERO,  2 puffs As needed for sob, Disp: , Rfl:     OXYGEN, Inhale 3 L into the lungs , Disp: , Rfl:     Family History   Problem Relation Age of Onset    Diabetes Mother     Heart Disease Mother     Stomach Cancer Father     Diabetes Maternal Grandmother         also aunts and uncles (maternal)    Emphysema Sister        Social History     Socioeconomic History    Marital status: Legally      Spouse name: Not on file    Number of children: Not on file    Years of education: Not on file    Highest education level: Not on file   Occupational History    Occupation: disabled     Employer: N/A   Tobacco Use    Smoking status: Former Smoker     Packs/day: 3.00     Years: 41.00     Pack years: 123.00     Types: Cigarettes     Quit date: 2000     Years since quittin.3    Smokeless tobacco: Never Used    Tobacco comment: quit in    Vaping Use    Vaping Use: Never used   Substance and Sexual Activity    Alcohol use: No     Alcohol/week: 0.0 standard drinks    Drug use: No    Sexual activity: Not Currently   Other Topics Concern    Not on file   Social History Narrative    Not on file     Social Determinants of Health     Financial Resource Strain: Low Risk     Difficulty of Paying Living Expenses: Not hard at all   Food Insecurity: No Food Insecurity    Worried About [facility] in the Last Year: Never true    [facility] in the Last Year: Never true   Transportation Needs:     Lack of Transportation (Medical): Not on file    Lack of Transportation (Non-Medical):  Not on file   Physical Activity:     Days of Exercise per Week: Not on file    Minutes of Exercise per Session: Not on file   Stress:     Feeling of Stress : Not on file   Social Connections:     Frequency of Communication with Friends and Family: Not on file    Frequency of Social Gatherings with Friends and Family: Not on file   Juan Levine Attends Cheondoism Services: Not on file    Active Member of Clubs or Organizations: Not on file    Attends Club or Organization Meetings: Not on file    Marital Status: Not on file   Intimate Partner Violence:     Fear of Current or Ex-Partner: Not on file    Emotionally Abused: Not on file    Physically Abused: Not on file    Sexually Abused: Not on file   Housing Stability:     Unable to Pay for Housing in the Last Year: Not on file    Number of [place] in the Last Year: Not on file    Unstable Housing in the Last Year: Not on file       Review of Systems:  Review of Systems   Constitutional: Negative for chills and fever. Cardiovascular: Negative for chest pain. Respiratory: Positive for shortness of breath. Negative for cough. Musculoskeletal: Positive for back pain, muscle cramps, muscle weakness and myalgias. Negative for falls. Gastrointestinal: Negative for bowel incontinence and constipation. Genitourinary: Positive for bladder incontinence. Neurological: Positive for numbness (occ.). Negative for headaches, tingling and weakness. Physical Exam:  BP (!) 143/55   Pulse 80   Temp 98.6 °F (37 °C)   LMP  (LMP Unknown)   SpO2 91%     Physical Exam  Cardiovascular:      Rate and Rhythm: Normal rate. Pulmonary:      Effort: Pulmonary effort is normal.   Musculoskeletal:      Lumbar back: Decreased range of motion. Comments: In scooter   Skin:     General: Skin is warm and dry. Neurological:      Mental Status: She is alert and oriented to person, place, and time.          Record/Diagnostics Review:    Last greg 2/22  and was appropriate     Assessment:  Problem List Items Addressed This Visit     DDD (degenerative disc disease), cervical (Chronic)    Relevant Medications    oxyCODONE-acetaminophen (PERCOCET) 5-325 MG per tablet    Lumbosacral spondylosis without myelopathy (Chronic)    Relevant Medications    oxyCODONE-acetaminophen (PERCOCET) 5-325 MG per tablet Sacroiliitis, not elsewhere classified ([facility] (Chronic)    Relevant Medications    oxyCODONE-acetaminophen (PERCOCET) 5-325 MG per tablet    DDD (degenerative disc disease), lumbar - Primary    Relevant Medications    oxyCODONE-acetaminophen (PERCOCET) 5-325 MG per tablet    Chronic, continuous use of opioids    Lumbar radiculopathy, chronic    Relevant Medications    oxyCODONE-acetaminophen (PERCOCET) 5-325 MG per tablet             Treatment Plan:  Patient relates current medications are helping the pain. Patient reports taking pain medications as prescribed, denies obtaining medications from different sources and denies use of illegal drugs. Patient denies side effects from medications like nausea, vomiting, constipation or drowsiness. Patient reports current activities of daily living are possible due to medications and would like to continue them. As always, we encourage daily stretching and strengthening exercises, and recommend minimizing use of pain medications unless patient cannot get through daily activities due to pain. Contract requirements met. Continue opioid therapy. Script written for percocet  Follow up appointment made for 4 weeks    I have reviewed the chief complaint and history of present illness (including ROS and PFSH) and vital documentation by my staff and I agree with their documentation and have added where applicable.

## 2022-05-10 ENCOUNTER — OFFICE VISIT (OUTPATIENT)
Dept: FAMILY MEDICINE CLINIC | Age: 72
End: 2022-05-10
Payer: COMMERCIAL

## 2022-05-10 VITALS
DIASTOLIC BLOOD PRESSURE: 62 MMHG | HEIGHT: 62 IN | SYSTOLIC BLOOD PRESSURE: 108 MMHG | BODY MASS INDEX: 46.59 KG/M2 | WEIGHT: 253.2 LBS | TEMPERATURE: 98.4 F | OXYGEN SATURATION: 94 % | HEART RATE: 94 BPM

## 2022-05-10 DIAGNOSIS — R11.0 CHRONIC NAUSEA: ICD-10-CM

## 2022-05-10 DIAGNOSIS — I50.32 CHRONIC DIASTOLIC CONGESTIVE HEART FAILURE (HCC): ICD-10-CM

## 2022-05-10 DIAGNOSIS — E66.2 MORBID OBESITY WITH ALVEOLAR HYPOVENTILATION (HCC): ICD-10-CM

## 2022-05-10 DIAGNOSIS — M54.16 LUMBAR RADICULOPATHY, CHRONIC: ICD-10-CM

## 2022-05-10 DIAGNOSIS — K21.00 GASTROESOPHAGEAL REFLUX DISEASE WITH ESOPHAGITIS WITHOUT HEMORRHAGE: ICD-10-CM

## 2022-05-10 DIAGNOSIS — Z99.81 OXYGEN DEPENDENT: ICD-10-CM

## 2022-05-10 DIAGNOSIS — F32.A ANXIETY AND DEPRESSION: ICD-10-CM

## 2022-05-10 DIAGNOSIS — Z79.4 TYPE 2 DIABETES MELLITUS WITH DIABETIC POLYNEUROPATHY, WITH LONG-TERM CURRENT USE OF INSULIN (HCC): ICD-10-CM

## 2022-05-10 DIAGNOSIS — F41.9 ANXIETY AND DEPRESSION: ICD-10-CM

## 2022-05-10 DIAGNOSIS — I12.9 BENIGN HYPERTENSION WITH CKD (CHRONIC KIDNEY DISEASE) STAGE III (HCC): ICD-10-CM

## 2022-05-10 DIAGNOSIS — E11.42 TYPE 2 DIABETES MELLITUS WITH DIABETIC POLYNEUROPATHY, WITH LONG-TERM CURRENT USE OF INSULIN (HCC): ICD-10-CM

## 2022-05-10 DIAGNOSIS — I10 ESSENTIAL HYPERTENSION: ICD-10-CM

## 2022-05-10 DIAGNOSIS — N18.30 BENIGN HYPERTENSION WITH CKD (CHRONIC KIDNEY DISEASE) STAGE III (HCC): ICD-10-CM

## 2022-05-10 DIAGNOSIS — E11.42 POLYNEUROPATHY DUE TO TYPE 2 DIABETES MELLITUS (HCC): ICD-10-CM

## 2022-05-10 DIAGNOSIS — J44.9 ASTHMA WITH COPD (HCC): ICD-10-CM

## 2022-05-10 DIAGNOSIS — I25.10 CORONARY ARTERY DISEASE DUE TO LIPID RICH PLAQUE: Primary | ICD-10-CM

## 2022-05-10 DIAGNOSIS — E78.2 MIXED HYPERLIPIDEMIA: ICD-10-CM

## 2022-05-10 DIAGNOSIS — I25.83 CORONARY ARTERY DISEASE DUE TO LIPID RICH PLAQUE: Primary | ICD-10-CM

## 2022-05-10 PROCEDURE — 2022F DILAT RTA XM EVC RTNOPTHY: CPT | Performed by: FAMILY MEDICINE

## 2022-05-10 PROCEDURE — 99215 OFFICE O/P EST HI 40 MIN: CPT | Performed by: FAMILY MEDICINE

## 2022-05-10 PROCEDURE — 4040F PNEUMOC VAC/ADMIN/RCVD: CPT | Performed by: FAMILY MEDICINE

## 2022-05-10 PROCEDURE — G8417 CALC BMI ABV UP PARAM F/U: HCPCS | Performed by: FAMILY MEDICINE

## 2022-05-10 PROCEDURE — 1036F TOBACCO NON-USER: CPT | Performed by: FAMILY MEDICINE

## 2022-05-10 PROCEDURE — G8427 DOCREV CUR MEDS BY ELIG CLIN: HCPCS | Performed by: FAMILY MEDICINE

## 2022-05-10 PROCEDURE — 1090F PRES/ABSN URINE INCON ASSESS: CPT | Performed by: FAMILY MEDICINE

## 2022-05-10 PROCEDURE — 3046F HEMOGLOBIN A1C LEVEL >9.0%: CPT | Performed by: FAMILY MEDICINE

## 2022-05-10 PROCEDURE — 1123F ACP DISCUSS/DSCN MKR DOCD: CPT | Performed by: FAMILY MEDICINE

## 2022-05-10 PROCEDURE — 3023F SPIROM DOC REV: CPT | Performed by: FAMILY MEDICINE

## 2022-05-10 PROCEDURE — 3017F COLORECTAL CA SCREEN DOC REV: CPT | Performed by: FAMILY MEDICINE

## 2022-05-10 PROCEDURE — 1111F DSCHRG MED/CURRENT MED MERGE: CPT | Performed by: FAMILY MEDICINE

## 2022-05-10 PROCEDURE — G8399 PT W/DXA RESULTS DOCUMENT: HCPCS | Performed by: FAMILY MEDICINE

## 2022-05-10 RX ORDER — CLOPIDOGREL BISULFATE 75 MG/1
TABLET ORAL
Qty: 90 TABLET | Refills: 2
Start: 2022-05-10 | End: 2022-05-25

## 2022-05-10 RX ORDER — FUROSEMIDE 20 MG/1
TABLET ORAL
Qty: 30 TABLET | Refills: 0 | Status: SHIPPED | OUTPATIENT
Start: 2022-05-10 | End: 2022-07-25

## 2022-05-10 RX ORDER — ESCITALOPRAM OXALATE 10 MG/1
10 TABLET ORAL DAILY
Qty: 30 TABLET | Refills: 1 | Status: SHIPPED | OUTPATIENT
Start: 2022-05-10 | End: 2022-07-08 | Stop reason: SDUPTHER

## 2022-05-10 RX ORDER — ONDANSETRON 4 MG/1
4 TABLET, FILM COATED ORAL 3 TIMES DAILY PRN
Qty: 30 TABLET | Refills: 1 | Status: SHIPPED | OUTPATIENT
Start: 2022-05-10 | End: 2022-05-12 | Stop reason: SDUPTHER

## 2022-05-10 RX ORDER — OMEPRAZOLE 40 MG/1
40 CAPSULE, DELAYED RELEASE ORAL
Qty: 30 CAPSULE | Refills: 1 | Status: SHIPPED | OUTPATIENT
Start: 2022-05-10 | End: 2022-07-25

## 2022-05-10 RX ORDER — PREDNISONE 20 MG/1
20 TABLET ORAL DAILY
COMMUNITY
End: 2022-07-08 | Stop reason: ALTCHOICE

## 2022-05-10 ASSESSMENT — ENCOUNTER SYMPTOMS
BACK PAIN: 1
ABDOMINAL DISTENTION: 1
APNEA: 1
ABDOMINAL PAIN: 0
SHORTNESS OF BREATH: 1
NAUSEA: 1
CHEST TIGHTNESS: 1
WHEEZING: 1

## 2022-05-10 ASSESSMENT — PATIENT HEALTH QUESTIONNAIRE - PHQ9
SUM OF ALL RESPONSES TO PHQ QUESTIONS 1-9: 5
8. MOVING OR SPEAKING SO SLOWLY THAT OTHER PEOPLE COULD HAVE NOTICED. OR THE OPPOSITE, BEING SO FIGETY OR RESTLESS THAT YOU HAVE BEEN MOVING AROUND A LOT MORE THAN USUAL: 0
SUM OF ALL RESPONSES TO PHQ9 QUESTIONS 1 & 2: 1
10. IF YOU CHECKED OFF ANY PROBLEMS, HOW DIFFICULT HAVE THESE PROBLEMS MADE IT FOR YOU TO DO YOUR WORK, TAKE CARE OF THINGS AT HOME, OR GET ALONG WITH OTHER PEOPLE: 1
SUM OF ALL RESPONSES TO PHQ QUESTIONS 1-9: 5
5. POOR APPETITE OR OVEREATING: 1
1. LITTLE INTEREST OR PLEASURE IN DOING THINGS: 0
6. FEELING BAD ABOUT YOURSELF - OR THAT YOU ARE A FAILURE OR HAVE LET YOURSELF OR YOUR FAMILY DOWN: 0
SUM OF ALL RESPONSES TO PHQ QUESTIONS 1-9: 5
9. THOUGHTS THAT YOU WOULD BE BETTER OFF DEAD, OR OF HURTING YOURSELF: 0
SUM OF ALL RESPONSES TO PHQ QUESTIONS 1-9: 5
4. FEELING TIRED OR HAVING LITTLE ENERGY: 1
2. FEELING DOWN, DEPRESSED OR HOPELESS: 1
3. TROUBLE FALLING OR STAYING ASLEEP: 1
7. TROUBLE CONCENTRATING ON THINGS, SUCH AS READING THE NEWSPAPER OR WATCHING TELEVISION: 1

## 2022-05-10 NOTE — PROGRESS NOTES
Visit Information    Have you changed or started any medications since your last visit including any over-the-counter medicines, vitamins, or herbal medicines? no   Have you stopped taking any of your medications? Is so, why? -  no  Are you having any side effects from any of your medications? - no    Have you seen any other physician or provider since your last visit? yes    Have you had any other diagnostic tests since your last visit? yes    Have you been seen in the emergency room and/or had an admission in a hospital since we last saw you?  yes    Have you had your routine dental cleaning in the past 6 months?  no     Do you have an active MyChart account? If no, what is the barrier?   Yes    Patient Care Team:  Ronaldo Ocasio MD as PCP - General (Family Medicine)  Ronaldo Ocasio MD as PCP - [facility]  Tate Robledo MD as Consulting Physician (Urology)  Pebbles Morgan MD as Consulting Physician (Pulmonology)  Bobby Smith (Optometry)  VERONIKA Tam CNP as Nurse Practitioner (Certified Nurse Practitioner)  Cata Wright MD as Consulting Physician (Internal Medicine Cardiovascular Disease)    Medical History Review  Past Medical, Family, and Social History reviewed and does contribute to the patient presenting condition    Health Maintenance   Topic Date Due    Annual Wellness Visit (AWV)  Never done    DTaP/Tdap/Td vaccine (1 - Tdap) Never done    Diabetic retinal exam  10/29/2021    COVID-19 Vaccine (4 - Booster for Eyal Lords series) 02/03/2022    A1C test (Diabetic or Prediabetic)  06/01/2022    Breast cancer screen  11/17/2022    Depression Monitoring  03/01/2023    Lipids  03/09/2023    Potassium  05/01/2023    Creatinine  05/01/2023    Diabetic foot exam  05/06/2023    Colorectal Cancer Screen  03/02/2024    DEXA (modify frequency per FRAX score)  Completed    Flu vaccine  Completed    Pneumococcal 65+ years Vaccine  Completed    Hepatitis C screen Completed    Hepatitis A vaccine  Aged Out    Hib vaccine  Aged Out    Meningococcal (ACWY) vaccine  Aged Out

## 2022-05-10 NOTE — PATIENT INSTRUCTIONS
Patient Education        escitalopram  Pronunciation: BENJY JACKSON o phyllis  Brand: Lexapro  What is the most important information I should know about escitalopram?  You should not use this medicine you also take pimozide or citalopram (Celexa). Do not use escitalopram within 14 days before or 14 days after you have used an MAO inhibitor, such as isocarboxazid, linezolid, methylene blue injection, phenelzine,rasagiline, selegiline, or tranylcypromine. Some young people have thoughts about suicide when first taking an antidepressant. Stay alert to changes in your mood or symptoms. Report any new or worsening symptoms to your doctor. Seek medical attention right away if you have symptoms of serotonin syndrome, such as: agitation, hallucinations, fever, sweating, shivering, fast heart rate, musclestiffness, twitching, loss of coordination, nausea, vomiting, or diarrhea. Do not stop using escitalopram without first asking your doctor. What is escitalopram?  Escitalopram is a selective serotonin reuptake inhibitor SSRI antidepressant. Escitalopram is used to treat major depressive disorder in adults andadolescents at least 15years old. Escitalopram is also used to treat anxiety in adults. Escitalopram may also be used for purposes not listed in this medication guide. What should I discuss with my healthcare provider before taking escitalopram?  You should not use this medicine if you are allergic to escitalopram orcitalopram (Celexa), or if:   you also take pimozide. Do not use escitalopram within 14 days before or 14 days after you have used an MAO inhibitor. A dangerous drug interaction could occur. MAO inhibitors include isocarboxazid, linezolid, phenelzine, rasagiline, selegiline, andtranylcypromine.   Be sure your doctor knows if you also take stimulant medicine, opioid medicine, herbal products, or medicine for depression, mental illness, Parkinson's disease, migraine headaches, serious infections, or prevention of nausea and vomiting. These medicines may interact with escitalopram and cause a serious condition called serotonin syndrome. Tell your doctor if you have ever had:   liver or kidney disease;   seizures;   low levels of sodium in your blood;   heart disease, high blood pressure;   a stroke;   bleeding problems;   sexual problems;   bipolar disorder (manic depression); or   drug addiction or suicidal thoughts. Some young people have thoughts about suicide when first taking an antidepressant. Your doctor should check your progress at regular visits. Your family or other caregivers should also be alert to changes in your mood orsymptoms. Escitalopram is not approved for use by anyone younger than 15years old. Ask your doctor about taking this medicine if you are pregnant. Taking an SSRI antidepressant during late pregnancy may cause serious medical complications in the baby. However, you may have a relapse of depression if you stop taking your antidepressant. Tell your doctor right away if you become pregnant. Do not start or stop taking this medicine without your doctor's advice. If you are pregnant, your name may be listed on a pregnancy registry to trackthe effects of escitalopram on the baby. If you are breastfeeding, tell your doctor if you notice drowsiness, agitation,feeding problems, or poor weight gain in the nursing baby. How should I take escitalopram?  Follow all directions on your prescription label and read all medication guides or instruction sheets. Your doctor may occasionally change your dose. Use themedicine exactly as directed. Take the medicine at the same time each day, with or without food. Measure liquid medicine carefully. Use the dosing syringe provided, or use a medicine dose-measuringdevice (not a kitchen spoon). It may take up to 4 weeks before your symptoms improve.  Keep using themedication as directed and tell your doctor if your symptoms do not improve. Tell your doctor if you have any changes in sexual function, such as loss of interest in sex, trouble having an orgasm, or (in men)problems with erections or ejaculation. Some sexual problems can be treated. Your doctor will need to check your progress on a regular basis. A child takingescitalopram should be checked for height and weight gain. Do not stop using escitalopram suddenly, or you could have unpleasant withdrawal symptoms. Follow your doctor'sinstructions about tapering your dose. Store at room temperature away from moisture and heat. What happens if I miss a dose? Take the medicine as soon as you can, but skip the missed dose if it is almost time for your next dose. Do not take two doses at one time. What happens if I overdose? Seek emergency medical attention or call the Poison Help line at 1-211.952.5861. What should I avoid while taking escitalopram?  Ask your doctor before taking a nonsteroidal anti-inflammatory drug (NSAID) such as aspirin, ibuprofen (Advil, Motrin), naproxen (Aleve), celecoxib (Celebrex), diclofenac, indomethacin, meloxicam, and others. Using an NSAIDwith escitalopram may cause you to bruise or bleed easily. Avoid alcohol. Avoid driving or hazardous activity until you know how this medicine willaffect you. Your reactions could be impaired. What are the possible side effects of escitalopram?  Get emergency medical help if you have signs of an allergic reaction: skin rash or hives; difficulty breathing; swelling of your face, lips, tongue,or throat. Report any new or worsening symptoms to your doctor, such as: mood or behavior changes, anxiety, panic attacks, trouble sleeping, or if you feel impulsive, irritable, agitated, hostile, aggressive, restless, hyperactive (mentally or physically), more depressed, or have thoughts aboutsuicide or hurting yourself.   Call your doctor at once if you have:   blurred vision, tunnel vision, eye pain or swelling, or seeing halos around lights;   racing thoughts, unusual risk-taking behavior, feelings of extreme happiness or sadness;   pain or burning when you urinate;   (in a child taking escitalopram) slow growth or weight gain;   low levels of sodium in the body --headache, confusion, slurred speech, severe weakness, vomiting, loss of coordination, feeling unsteady; or   severe nervous system reaction --very stiff (rigid) muscles, high fever, sweating, confusion, fast or uneven heartbeats, tremors, feeling like you might pass out. Seek medical attention right away if you have symptoms of serotonin syndrome, such as: agitation, hallucinations, fever, sweating, shivering, fast heart rate, musclestiffness, twitching, loss of coordination, nausea, vomiting, or diarrhea. Common side effects may include:   painful urination;   dizziness, drowsiness, tiredness, weakness;   feeling anxious or agitated;   increased muscle movements, feeling shaky;   sleep problems (insomnia);   sweating, dry mouth, increased thirst, loss of appetite;   nausea, constipation;   yawning;   nosebleed, heavy menstrual periods; or   decreased sex drive, impotence, or difficulty having an orgasm. This is not a complete list of side effects and others may occur. Call your doctor for medical advice about side effects. You may report side effects toFDA at 8-041-KZN-8163. What other drugs will affect escitalopram?  Using escitalopram with other drugs that make you drowsy can worsen this effect. Ask your doctor before using opioid medication, a sleeping pill, amuscle relaxer, or medicine for anxiety or seizures. Tell your doctor about all your current medicines, especially a blood thinner such as warfarin, Coumadin, or Jantoven. Many drugs can affect escitalopram, and some drugs should not be used at the same time. Tell your doctor about all your current medicines and any medicine you start or stop using.  This includes prescription and over-the-counter medicines, vitamins, and herbal products. Not all possible interactions are listed here. Where can I get more information? Your pharmacist can provide more information about escitalopram.  Remember, keep this and all other medicines out of the reach of children, never share your medicines with others, and use this medication only for the indication prescribed. Every effort has been made to ensure that the information provided by [address]bi [address] is accurate, up-to-date, and complete, but no guarantee is made to that effect. Drug information contained herein may be time sensitive. [facility] information has been compiled for use by healthcare practitioners and consumers in the [place] and therefore [facility] does not warrant that uses outside of the [place] are appropriate, unless specifically indicated otherwise. [facility]'s drug information does not endorse drugs, diagnose patients or recommend therapy. [facility]'s drug information is an informational resource designed to assist licensed healthcare practitioners in caring for their patients and/or to serve consumers viewing this service as a supplement to, and not a substitute for, the expertise, skill, knowledge and judgment of healthcare practitioners. The absence of a warning for a given drug or drug combination in no way should be construed to indicate that the drug or drug combination is safe, effective or appropriate for any given patient. [facility] does not assume any responsibility for any aspect of healthcare administered with the aid of information [facility] provides. The information contained herein is not intended to cover all possible uses, directions, precautions, warnings, drug interactions, allergic reactions, or adverse effects. If you have questions about the drugs you are taking, check with yourdoctor, nurse or pharmacist.  Copyright 1300-2888 [address]. Version: 18.01. Revision date:11/8/2021.   Care instructions adapted under license by [facility] ([facility]). If you have questions about a medical condition or this instruction, always ask your healthcare professional. [address] any warranty or liability for your use of this information.

## 2022-05-12 ENCOUNTER — TELEPHONE (OUTPATIENT)
Dept: FAMILY MEDICINE CLINIC | Age: 72
End: 2022-05-12

## 2022-05-12 DIAGNOSIS — R11.0 CHRONIC NAUSEA: ICD-10-CM

## 2022-05-12 RX ORDER — ONDANSETRON 4 MG/1
4 TABLET, FILM COATED ORAL 3 TIMES DAILY PRN
Qty: 90 TABLET | Refills: 2 | Status: SHIPPED | OUTPATIENT
Start: 2022-05-12 | End: 2022-07-08 | Stop reason: ALTCHOICE

## 2022-05-12 NOTE — TELEPHONE ENCOUNTER
ondansetron (ZOFRAN) 4 MG tablet [[id number]]     Order Details  Dose: 4 mg Route: Oral Frequency: 3 TIMES DAILY PRN for Nausea, Vomiting   Dispense Quantity: 30 tablet Refills: 1          Sig: Take 1 tablet by mouth 3 times daily as needed for Nausea or Vomiting     Pt. Called stating that she will like for another script called in, stated that the medication above caused her to have vomiting issues.

## 2022-05-25 DIAGNOSIS — I25.83 CORONARY ARTERY DISEASE DUE TO LIPID RICH PLAQUE: ICD-10-CM

## 2022-05-25 DIAGNOSIS — I25.10 CORONARY ARTERY DISEASE DUE TO LIPID RICH PLAQUE: ICD-10-CM

## 2022-05-25 RX ORDER — CLOPIDOGREL BISULFATE 75 MG/1
TABLET ORAL
Qty: 90 TABLET | Refills: 1 | Status: SHIPPED | OUTPATIENT
Start: 2022-05-25

## 2022-06-02 ENCOUNTER — HOSPITAL ENCOUNTER (OUTPATIENT)
Dept: PAIN MANAGEMENT | Age: 72
Discharge: HOME | End: 2022-06-02
Payer: COMMERCIAL

## 2022-06-02 VITALS
SYSTOLIC BLOOD PRESSURE: 113 MMHG | TEMPERATURE: 97.3 F | RESPIRATION RATE: 16 BRPM | HEART RATE: 83 BPM | DIASTOLIC BLOOD PRESSURE: 68 MMHG | BODY MASS INDEX: 43.98 KG/M2 | OXYGEN SATURATION: 90 % | HEIGHT: 62 IN | WEIGHT: 239 LBS

## 2022-06-02 DIAGNOSIS — M47.817 LUMBOSACRAL SPONDYLOSIS WITHOUT MYELOPATHY: Chronic | ICD-10-CM

## 2022-06-02 DIAGNOSIS — M46.1 SACROILIITIS, NOT ELSEWHERE CLASSIFIED (HCC): Chronic | ICD-10-CM

## 2022-06-02 DIAGNOSIS — M50.30 DDD (DEGENERATIVE DISC DISEASE), CERVICAL: Chronic | ICD-10-CM

## 2022-06-02 DIAGNOSIS — M54.41 CHRONIC BILATERAL LOW BACK PAIN WITH BILATERAL SCIATICA: Primary | Chronic | ICD-10-CM

## 2022-06-02 DIAGNOSIS — M54.42 CHRONIC BILATERAL LOW BACK PAIN WITH BILATERAL SCIATICA: Primary | Chronic | ICD-10-CM

## 2022-06-02 DIAGNOSIS — G89.29 CHRONIC BILATERAL LOW BACK PAIN WITH BILATERAL SCIATICA: Primary | Chronic | ICD-10-CM

## 2022-06-02 DIAGNOSIS — M54.16 LUMBAR RADICULOPATHY, CHRONIC: ICD-10-CM

## 2022-06-02 DIAGNOSIS — E66.01 MORBID OBESITY WITH BMI OF 40.0-44.9, ADULT (HCC): ICD-10-CM

## 2022-06-02 PROBLEM — Z79.891 CHRONIC USE OF OPIATE DRUG FOR THERAPEUTIC PURPOSE: Status: ACTIVE | Noted: 2019-11-07

## 2022-06-02 PROCEDURE — 99214 OFFICE O/P EST MOD 30 MIN: CPT | Performed by: ANESTHESIOLOGY

## 2022-06-02 PROCEDURE — 99213 OFFICE O/P EST LOW 20 MIN: CPT

## 2022-06-02 RX ORDER — OXYCODONE HYDROCHLORIDE AND ACETAMINOPHEN 5; 325 MG/1; MG/1
1 TABLET ORAL EVERY 8 HOURS PRN
Qty: 90 TABLET | Refills: 0 | Status: SHIPPED | OUTPATIENT
Start: 2022-06-02 | End: 2022-07-06 | Stop reason: SDUPTHER

## 2022-06-02 ASSESSMENT — ENCOUNTER SYMPTOMS
EYES NEGATIVE: 1
RESPIRATORY NEGATIVE: 1
BACK PAIN: 1

## 2022-06-02 ASSESSMENT — PAIN SCALES - GENERAL: PAINLEVEL_OUTOF10: 7

## 2022-06-02 NOTE — PROGRESS NOTES
The patient is a 66 y. o. Non- / non  female. Chief Complaint   Patient presents with    Back Pain    Medication Refill        HPI    Medication Refill: Oxycodone    Pain score Today:  7  Adverse effects (Constipation / Nausea / Sedation / sexual Dysfunction / others) :  none  Mood: fair  Sleep pattern and quality: good  Activity level: good    Pill count Today: 21  Last dose taken 06/01/2022 night  OARRS report reviewed today: yes  ER/Hospitalizations/PCP visit related to pain since last visit:no   Any legal problems e.g. DUI etc.:No  Satisfied with current management: Yes    Opioid Contract: 02/07/2022  Last Urine Dug screen dated: 02/07/2022    Lab Results   Component Value Date    LABA1C 12.5 03/01/2022     Lab Results   Component Value Date     10/31/2019       Past Medical History, Past Surgical History, Social History, Allergies and Medications reviewed and updated in EPIC as indicated    Family History reviewed and is noncontributory. Past Medical History:   Diagnosis Date    Abdominal bloating 1/26/2021    Adenomatous polyp of ascending colon 1/26/2021    Allergic rhinitis     Anxiety 7/17/2013    Arthritis     Asthma     Atrial fibrillation (HCC)     Back pain     NERVE/DR. GRACIA    Benign hypertension with CKD (chronic kidney disease) stage III (HCC)     CAD (coronary artery disease) 3/21/2013    Caffeine use     2 coffee/day    CHF (congestive heart failure) (HCC)     Chronic kidney disease     CKD (chronic kidney disease) stage 3, GFR 30-59 ml/min (HCC)     COPD (chronic obstructive pulmonary disease) (HCC)     emphysema    Degeneration of lumbar or lumbosacral intervertebral disc     Depression     DM (diabetes mellitus) (HCC)     Emphysema of lung (HCC)     Gastritis     GERD (gastroesophageal reflux disease)     Hearing loss     Hematuria     Hiatal hernia     HTN (hypertension), benign 6/28/2014    Hypertriglyceridemia     Incontinence of urine 9/25/2013    Irritable bowel syndrome with both constipation and diarrhea 1/26/2021    Knee arthropathy     Lumbosacral spondylosis without myelopathy 9/29/2014    Migraine headache     DR. BASIL [place]     Neuropathy     Obesity     On home oxygen therapy     2 L PER NC  HS AND PRN    HIGINIO on CPAP     Otitis media 1-26-15    Secondary diabetes mellitus with stage 3 chronic kidney disease (GFR 30-59) (HCC)     Stroke ([facility] [address]      mini stroke.  Tinnitus     Type 2 diabetes mellitus without complication (HCC)     Type II or unspecified type diabetes mellitus without mention of complication, not stated as uncontrolled     UTI (lower urinary tract infection)     Varicose vein         Past Surgical History:   Procedure Laterality Date    ABLATION OF DYSRHYTHMIC FOCUS  2000    CARPAL TUNNEL RELEASE Right     CATARACT REMOVAL WITH IMPLANT Bilateral     CHOLECYSTECTOMY  2007    COLONOSCOPY      COLONOSCOPY  04/10/2017    tubular adenomo polyp , mod. sigmoid diverticulosis, min. int. hemorrhoids    COLONOSCOPY N/A 3/2/2021    COLONOSCOPY WITH BIOPSY performed by Yahir Schaffer MD at [place] [address]  9/25/2013    DILATION AND CURETTAGE  1979    EYE SURGERY      laser    INCONTINENCE SURGERY      Percutaneous nerve stimulation Dr Coleen Ding Nov 2013     INSERTABLE CARDIAC MONITOR  12/04/2015    [employer] REVEAL LINQ MODEL #[id number] MRI CONDTIONAL OK 3T.  IMMEDIATELY POST IMPLANT    OTHER SURGICAL HISTORY  09/10/15    removal of interstim    SC COLSC FLX W/REMOVAL LESION BY HOT BX FORCEPS N/A 4/10/2017    COLONOSCOPY POLYPECTOMY HOT BIOPSY performed by Judith Ruiz MD at [address] TYMPANOPLASTY      TYMPANOPLASTY      TYMPANOSTOMY TUBE PLACEMENT  08/21/2017    UPPER GASTROINTESTINAL ENDOSCOPY  03/2016    Dr Debi Trammell N/A 3/2/2021    EGD BIOPSY performed by Yahir Schaffer MD at [address] [place] [address] Socioeconomic History    Marital status: Legally      Spouse name: None    Number of children: None    Years of education: None    Highest education level: None   Occupational History    Occupation: disabled     Employer: N/A   Tobacco Use    Smoking status: Former Smoker     Packs/day: 3.00     Years: 41.00     Pack years: 123.00     Types: Cigarettes     Quit date: 2000     Years since quittin.4    Smokeless tobacco: Never Used    Tobacco comment: quit in    Vaping Use    Vaping Use: Never used   Substance and Sexual Activity    Alcohol use: No     Alcohol/week: 0.0 standard drinks    Drug use: No    Sexual activity: Not Currently   Other Topics Concern    None   Social History Narrative    None     Social Determinants of Health     Financial Resource Strain: Low Risk     Difficulty of Paying Living Expenses: Not hard at all   Food Insecurity: No Food Insecurity    Worried About Running Out of Food in the Last Year: Never true    Nadir of Food in the Last Year: Never true   Transportation Needs:     Lack of Transportation (Medical): Not on file    Lack of Transportation (Non-Medical):  Not on file   Physical Activity:     Days of Exercise per Week: Not on file    Minutes of Exercise per Session: Not on file   Stress:     Feeling of Stress : Not on file   Social Connections:     Frequency of Communication with Friends and Family: Not on file    Frequency of Social Gatherings with Friends and Family: Not on file    Attends Yazidi Services: Not on file    Active Member of Clubs or Organizations: Not on file    Attends Club or Organization Meetings: Not on file    Marital Status: Not on file   Intimate Partner Violence:     Fear of Current or Ex-Partner: Not on file    Emotionally Abused: Not on file    Physically Abused: Not on file    Sexually Abused: Not on file   Housing Stability:     Unable to Pay for Housing in the Last Year: Not on file    Number of Places Lived in the Last Year: Not on file    Unstable Housing in the Last Year: Not on file       Family History   Problem Relation Age of Onset    Diabetes Mother     Heart Disease Mother     Stomach Cancer Father     Diabetes Maternal Grandmother         also aunts and uncles (maternal)    Emphysema Sister        Allergies   Allergen Reactions    Robitussin [Guaifenesin] Anaphylaxis    Codeine Swelling    Compazine [Prochlorperazine Maleate] Hives and Swelling    Iodides Itching    Morphine Other (See Comments)     hallucinations    Moxifloxacin      Other reaction(s): Intolerance-unknown  Other reaction(s): Intolerance-unknown    Reglan [Metoclopramide] Nausea Only    Avelox [Moxifloxacin Hcl In Nacl] Rash     Dermatitis      Cipro Xr Rash     dermatitis    Sulfa Antibiotics Rash       There were no vitals filed for this visit. Current Outpatient Medications   Medication Sig Dispense Refill    clopidogrel (PLAVIX) 75 MG tablet TAKE 1 TAB BY MOUTH ONCE A DAY ( IN THE MORNING ) -THIS MEDICINE MAY BE TAKENWITH OR WITHOUT FOOD 90 tablet 1    ondansetron (ZOFRAN) 4 MG tablet Take 1 tablet by mouth 3 times daily as needed for Nausea or Vomiting 90 tablet 2    predniSONE (DELTASONE) 20 MG tablet Take 20 mg by mouth daily      omeprazole (PRILOSEC) 40 MG delayed release capsule Take 1 capsule by mouth every morning (before breakfast) 30 capsule 1    escitalopram (LEXAPRO) 10 MG tablet Take 1 tablet by mouth daily 30 tablet 1    furosemide (LASIX) 20 MG tablet TAKE 1 TABLET BY MOUTH ONCE DAILY AS NEEDED 30 tablet 0    oxyCODONE-acetaminophen (PERCOCET) 5-325 MG per tablet Take 1 tablet by mouth See Admin Instructions for 30 days. Intended supply: 30 days.  1 tab 3-4 times a day prn 100 tablet 0    ranolazine (RANEXA) 500 MG extended release tablet Take 1 tablet by mouth 2 times daily for 14 days 28 tablet 0    FEROSUL 325 (65 Fe) MG tablet TAKE 1 TAB BY MOUTH IN THE MORNING 30 tablet 0    ONETOUCH ULTRA strip TEST TWO (2) TO THREE (3) TIMES A DAY & AS NEEDED FOR SYMPTOMS OF IRREGULAR BLOOD GLUCOSE 100 strip 0    oxybutynin (DITROPAN-XL) 5 MG extended release tablet TAKE 1 TABLET BY MOUTH DAILY 30 tablet 3    fenofibrate (TRIGLIDE) 160 MG tablet TAKE 1 TABLET BY MOUTH DAILY 90 tablet 1    insulin aspart (NOVOLOG FLEXPEN) 100 UNIT/ML injection pen IF<139 NO INSULIN; 140-199-2 UN;200-249-4 UN;250-299-6 UN;300-349-8 UN;350-400=10 UN;ABOVE 400-12 UNIT(S) 10 mL 3    magnesium oxide (MAG-OX) 400 MG tablet       zoster recombinant adjuvanted vaccine (SHINGRIX) 50 MCG/0.5ML SUSR injection 50 MCG IM then repeat 2-6 months. 0.5 mL 1    insulin glargine (BASAGLAR KWIKPEN) 100 UNIT/ML injection pen Inject 55 Units into the skin 2 times daily 10 mL 2    Dulaglutide (TRULICITY) 8.08 MC/1.5EK SOPN Inject 0.75 mg into the skin every 7 days If pen needle is needed, please request 4 pen 5    Continuous Blood Gluc Sensor (DEXCOM G6 SENSOR) MISC Use daily for checking blood sugars 1 each 2    losartan (COZAAR) 25 MG tablet TAKE 1 TABLET BY MOUTH ONCE DAILY 30 tablet 9    carvedilol (COREG) 3.125 MG tablet TAKE 1 TAB BY MOUTH TWICE A DAY ( IN THE MORNING AND BEDTIME ) 180 tablet 3    ketoconazole (NIZORAL) 2 % cream Apply topically  2 times a day.  1 each 0    aspirin 81 MG EC tablet TAKE 1 TABLET BY MOUTH ONCE DAILY 90 tablet 1    docusate sodium (COLACE) 100 MG capsule TAKE 1 CAPSULE BY MOUTH TWICE A DAY 60 capsule 5    Multiple Vitamins-Minerals (CERTAVITE/ANTIOXIDANTS) TABS TAKE 1 TABLET BY MOUTH ONCE DAILY 30 tablet 5    isosorbide dinitrate (ISORDIL) 30 MG tablet TAKE 1 TABLET BY MOUTH ONCE DAILY 30 tablet 5    vitamin B-12 (CYANOCOBALAMIN) 100 MCG tablet take half a tablet BY MOUTH ONCE DAILY 30 tablet 0    Icosapent Ethyl (VASCEPA) 1 g CAPS capsule TAKE 1 CAPSULE BY MOUTH TWICE A DAY 60 capsule 0    dicyclomine (BENTYL) 10 MG capsule TAKE 1 CAPSULE BY MOUTH TWICE A DAY AS NEEDED FOR ABDOMINAL SPASMS 60 capsule 0    topiramate (TOPAMAX) 100 MG tablet TAKE 1 TABLET BY MOUTH NIGHTLY  90 tablet 2    metFORMIN (GLUCOPHAGE) 1000 MG tablet TAKE 1 TAB BY MOUTH TWICE A DAY ( IN THE MORNING AND BEDTIME )  180 tablet 3    blood glucose test strips (ONETOUCH ULTRA) strip TEST TWO (2) TO THREE (3) TIMES A DAY & AS NEEDED FOR SYMPTOMS OF IRREGULAR BLOOD GLUCOSE 100 strip 0    Blood Glucose Monitoring Suppl (ONE TOUCH ULTRA 2) w/Device KIT       atorvastatin (LIPITOR) 40 MG tablet Take 1 tablet by mouth daily 90 tablet 3    BiPAP Machine MISC repair/replace Bipap maintain 18/9CMH2O, Cflex=3,mask,tubing,filters,headgear,heated humidifier,all supplies PRN      Psyllium (METAMUCIL PO) Take by mouth 3 times daily Takes powder      blood glucose test strips (TRUE METRIX BLOOD GLUCOSE TEST) strip THREE TIMES A  each 3    albuterol (PROVENTIL) (2.5 MG/3ML) 0.083% nebulizer solution Take 3 mLs by nebulization every 6 hours as needed for Wheezing 120 each 3    ULTICARE MINI PEN NEEDLES 31G X 6 MM MISC USE AS DIRECTED  100 each 5    gabapentin (NEURONTIN) 300 MG capsule Take 1 capsule by mouth 3 times daily for 30 days. (Patient taking differently: Take 300 mg by mouth 2 times daily. ) 90 capsule 2    nystatin (MYCOSTATIN) 379378 UNIT/GM powder Apply 3 times daily. 3 Bottle 3    lidocaine (LMX) 4 % cream Apply topically every 8 hrs as needed for pain 45 g 3    Lift Chair MISC by Does not apply route 1 each 0    Misc.  Devices (ADJUST BATH/SHOWER SEAT/BACK) MISC Use daily for shower 1 each 0    Alcohol Swabs (B-D SINGLE USE SWABS REGULAR) PADS THREE TIMES A  each 0    nitroGLYCERIN (NITROSTAT) 0.4 MG SL tablet 1 under the tongue as needed for angina, may repeat q5mins for up three doses      Blood Glucose Monitoring Suppl HARMEET Use daily to check BS 1 Device 0    ipratropium-albuterol (DUONEB) 0.5-2.5 (3) MG/3ML SOLN nebulizer solution Inhale 3 mLs into the lungs every 4 hours 360 mL 2    Melatonin 10 MG TABS Take 10 mg by mouth nightly 90 tablet 3    Compression Stockings MISC by Does not apply route Pressure between 20 - 25 . 1 each 0    SYMBICORT 160-4.5 MCG/ACT AERO   2 puffs As needed for sob      OXYGEN Inhale 3 L into the lungs        No current facility-administered medications for this encounter. Review of Systems   Constitutional: Negative. HENT: Negative. Eyes: Negative. Respiratory: Negative. Cardiovascular: Negative. Musculoskeletal: Positive for back pain. Objective  Assessment & Plan  No diagnosis found. No orders of the defined types were placed in this encounter. No orders of the defined types were placed in this encounter.            Electronically signed by Anna Alvarado MA on 6/2/2022 at 9:09 AM

## 2022-06-02 NOTE — PROGRESS NOTES
The patient is a 66 y. o. Non- / non  female. Chief Complaint   Patient presents with    Back Pain    Medication Refill        Back Pain  Pertinent negatives include no fever. 60-year-old female with a past medical history significant for morbid obesity with BMI above 43, COPD with recent hospitalization  She is seen in this clinic for history of chronic low back pain  Reports radiation of pain down both legs  No previous lumbar spine surgical history  She is not interested in neurosurgery evaluation  She is mostly dependent on a scooter but is able to stand up and walk for short distances  Patient states that she had a bad experience with an injection in the past and is not interested in any interventional pain procedures  She is not interested in physical therapy  Patient is on chronic opioid therapy  Reports no side effects  Continue to report high pain score  Average pain score 1/62  Denies any illicit substance use  Medication Refill: Oxycodone  Pain score Today:  7  Adverse effects (Constipation / Nausea / Sedation / sexual Dysfunction / others) :  none  Mood: fair  Sleep pattern and quality: good  Activity level: good    Pill count Today: 21  Last dose taken 06/01/2022 night  OARRS report reviewed today: yes  ER/Hospitalizations/PCP visit related to pain since last visit:no   Any legal problems e.g. DUI etc.:No  Satisfied with current management: Yes    Opioid Contract: 02/07/2022  Last Urine Dug screen dated: 02/07/2022    Lab Results   Component Value Date    LABA1C 12.5 03/01/2022     Lab Results   Component Value Date     10/31/2019       Past Medical History, Past Surgical History, Social History, Allergies and Medications reviewed and updated in EPIC as indicated    Family History reviewed and is noncontributory.         Past Medical History:   Diagnosis Date    Abdominal bloating 1/26/2021    Adenomatous polyp of ascending colon 1/26/2021    Allergic rhinitis     Anxiety 7/17/2013    Arthritis     Asthma     Atrial fibrillation ([facility])     Back pain     NERVE/DR. GRACIA    Benign hypertension with CKD (chronic kidney disease) stage III ([facility])     CAD (coronary artery disease) 3/21/2013    Caffeine use     2 coffee/day    CHF (congestive heart failure) ([facility])     Chronic kidney disease     CKD (chronic kidney disease) stage 3, GFR 30-59 ml/min ([facility])     COPD (chronic obstructive pulmonary disease) ([facility])     emphysema    Degeneration of lumbar or lumbosacral intervertebral disc     Depression     DM (diabetes mellitus) ([facility])     Emphysema of lung ([facility])     Gastritis     GERD (gastroesophageal reflux disease)     Hearing loss     Hematuria     Hiatal hernia     HTN (hypertension), benign 6/28/2014    Hypertriglyceridemia     Incontinence of urine 9/25/2013    Irritable bowel syndrome with both constipation and diarrhea 1/26/2021    Knee arthropathy     Lumbosacral spondylosis without myelopathy 9/29/2014    Migraine headache     DR. GRACIA    Mumps     Neuropathy     Obesity     On home oxygen therapy     2 L PER NC  HS AND PRN    HIGINIO on CPAP     Otitis media 1-26-15    Secondary diabetes mellitus with stage 3 chronic kidney disease (GFR 30-59) ([facility])     Stroke ([facility]      mini stroke.     Tinnitus     Type 2 diabetes mellitus without complication ([facility])     Type II or unspecified type diabetes mellitus without mention of complication, not stated as uncontrolled     UTI (lower urinary tract infection)     Varicose vein         Past Surgical History:   Procedure Laterality Date    ABLATION OF DYSRHYTHMIC FOCUS  2000    CARPAL TUNNEL RELEASE Right     CATARACT REMOVAL WITH IMPLANT Bilateral     CHOLECYSTECTOMY  2007    COLONOSCOPY      COLONOSCOPY  04/10/2017    tubular adenomo polyp , mod. sigmoid diverticulosis, min. int. hemorrhoids    COLONOSCOPY N/A 3/2/2021    COLONOSCOPY WITH BIOPSY performed by Amanda Zheng MD at [address] [place] [address] CYSTOSCOPY  2013    DILATION AND CURETTAGE  1979    EYE SURGERY      laser    INCONTINENCE SURGERY      Percutaneous nerve stimulation Dr Coleen Ding 2013     INSERTABLE CARDIAC MONITOR  2015    [employer] REVEAL LINQ MODEL #[id number] MRI CONDTIONAL OK 3T. IMMEDIATELY POST IMPLANT    OTHER SURGICAL HISTORY  09/10/15    removal of interstim    MO COLSC FLX W/REMOVAL LESION BY HOT BX FORCEPS N/A 4/10/2017    COLONOSCOPY POLYPECTOMY HOT BIOPSY performed by Judith Ruiz MD at [facility]      TYMPANOPLASTY      TYMPANOPLASTY      TYMPANOSTOMY TUBE PLACEMENT  2017    UPPER GASTROINTESTINAL ENDOSCOPY  2016    Dr Debi Trammell N/A 3/2/2021    EGD BIOPSY performed by Yahir Schaffer MD at [address] History     Socioeconomic History    Marital status: Legally      Spouse name: None    Number of children: None    Years of education: None    Highest education level: None   Occupational History    Occupation: disabled     Employer: N/A   Tobacco Use    Smoking status: Former Smoker     Packs/day: 3.00     Years: 41.00     Pack years: 123.00     Types: Cigarettes     Quit date: 2000     Years since quittin.4    Smokeless tobacco: Never Used    Tobacco comment: quit in    Vaping Use    Vaping Use: Never used   Substance and Sexual Activity    Alcohol use: No     Alcohol/week: 0.0 standard drinks    Drug use: No    Sexual activity: Not Currently   Other Topics Concern    None   Social History Narrative    None     Social Determinants of Health     Financial Resource Strain: Low Risk     Difficulty of Paying Living Expenses: Not hard at all   Food Insecurity: No Food Insecurity    Worried About Running Out of Food in the Last Year: Never true    Nadir of Food in the Last Year: Never true   Transportation Needs:     Lack of Transportation (Medical):  Not on file    Lack of Transportation (Non-Medical): Not on file   Physical Activity:     Days of Exercise per Week: Not on file    Minutes of Exercise per Session: Not on file   Stress:     Feeling of Stress : Not on file   Social Connections:     Frequency of Communication with Friends and Family: Not on file    Frequency of Social Gatherings with Friends and Family: Not on file    Attends Congregational Services: Not on file    Active Member of [address] [place] [address] [employer] or Organizations: Not on file    Attends Club or Organization Meetings: Not on file    Marital Status: Not on file   Intimate Partner Violence:     Fear of Current or Ex-Partner: Not on file    Emotionally Abused: Not on file    Physically Abused: Not on file    Sexually Abused: Not on file   Housing Stability:     Unable to Pay for Housing in the Last Year: Not on file    Number of [place] in the Last Year: Not on file    Unstable Housing in the Last Year: Not on file       Family History   Problem Relation Age of Onset    Diabetes Mother     Heart Disease Mother     Stomach Cancer Father     Diabetes Maternal Grandmother         also aunts and uncles (maternal)    Emphysema Sister        Allergies   Allergen Reactions    Robitussin [Guaifenesin] Anaphylaxis    Codeine Swelling    Compazine [Prochlorperazine Maleate] Hives and Swelling    Iodides Itching    Morphine Other (See Comments)     hallucinations    Moxifloxacin      Other reaction(s): Intolerance-unknown  Other reaction(s): Intolerance-unknown    Reglan [Metoclopramide] Nausea Only    Avelox [Moxifloxacin Hcl In Nacl] Rash     Dermatitis      Cipro Xr Rash     dermatitis    Sulfa Antibiotics Rash       Vitals:    06/02/22 0921   BP: 113/68   Pulse: 83   Resp: 16   Temp: 97.3 °F (36.3 °C)   SpO2: 90%       Current Outpatient Medications   Medication Sig Dispense Refill    oxyCODONE-acetaminophen (PERCOCET) 5-325 MG per tablet Take 1 tablet by mouth every 8 hours as needed for Pain for up to 30 days.  Intended supply: 30 days. 90 tablet 0    clopidogrel (PLAVIX) 75 MG tablet TAKE 1 TAB BY MOUTH ONCE A DAY ( IN THE MORNING ) -THIS MEDICINE MAY BE TAKENWITH OR WITHOUT FOOD 90 tablet 1    ondansetron (ZOFRAN) 4 MG tablet Take 1 tablet by mouth 3 times daily as needed for Nausea or Vomiting 90 tablet 2    predniSONE (DELTASONE) 20 MG tablet Take 20 mg by mouth daily      omeprazole (PRILOSEC) 40 MG delayed release capsule Take 1 capsule by mouth every morning (before breakfast) 30 capsule 1    escitalopram (LEXAPRO) 10 MG tablet Take 1 tablet by mouth daily 30 tablet 1    furosemide (LASIX) 20 MG tablet TAKE 1 TABLET BY MOUTH ONCE DAILY AS NEEDED 30 tablet 0    ranolazine (RANEXA) 500 MG extended release tablet Take 1 tablet by mouth 2 times daily for 14 days 28 tablet 0    FEROSUL 325 (65 Fe) MG tablet TAKE 1 TAB BY MOUTH IN THE MORNING 30 tablet 0    ONETOUCH ULTRA strip TEST TWO (2) TO THREE (3) TIMES A DAY & AS NEEDED FOR SYMPTOMS OF IRREGULAR BLOOD GLUCOSE 100 strip 0    oxybutynin (DITROPAN-XL) 5 MG extended release tablet TAKE 1 TABLET BY MOUTH DAILY 30 tablet 3    fenofibrate (TRIGLIDE) 160 MG tablet TAKE 1 TABLET BY MOUTH DAILY 90 tablet 1    insulin aspart (NOVOLOG FLEXPEN) 100 UNIT/ML injection pen IF<139 NO INSULIN; 140-199-2 UN;200-249-4 UN;250-299-6 UN;300-349-8 UN;350-400=10 UN;ABOVE 400-12 UNIT(S) 10 mL 3    magnesium oxide (MAG-OX) 400 MG tablet       zoster recombinant adjuvanted vaccine (SHINGRIX) 50 MCG/0.5ML SUSR injection 50 MCG IM then repeat 2-6 months.  0.5 mL 1    insulin glargine (BASAGLAR KWIKPEN) 100 UNIT/ML injection pen Inject 55 Units into the skin 2 times daily 10 mL 2    Dulaglutide (TRULICITY) 9.49 AP/3.6BP SOPN Inject 0.75 mg into the skin every 7 days If pen needle is needed, please request 4 pen 5    Continuous Blood Gluc Sensor (DEXCOM G6 SENSOR) MISC Use daily for checking blood sugars 1 each 2    losartan (COZAAR) 25 MG tablet TAKE 1 TABLET BY MOUTH ONCE DAILY 30 tablet 9    carvedilol (COREG) 3.125 MG tablet TAKE 1 TAB BY MOUTH TWICE A DAY ( IN THE MORNING AND BEDTIME ) 180 tablet 3    ketoconazole (NIZORAL) 2 % cream Apply topically  2 times a day.  1 each 0    aspirin 81 MG EC tablet TAKE 1 TABLET BY MOUTH ONCE DAILY 90 tablet 1    docusate sodium (COLACE) 100 MG capsule TAKE 1 CAPSULE BY MOUTH TWICE A DAY 60 capsule 5    Multiple Vitamins-Minerals (CERTAVITE/ANTIOXIDANTS) TABS TAKE 1 TABLET BY MOUTH ONCE DAILY 30 tablet 5    isosorbide dinitrate (ISORDIL) 30 MG tablet TAKE 1 TABLET BY MOUTH ONCE DAILY 30 tablet 5    vitamin B-12 (CYANOCOBALAMIN) 100 MCG tablet take half a tablet BY MOUTH ONCE DAILY 30 tablet 0    Icosapent Ethyl (VASCEPA) 1 g CAPS capsule TAKE 1 CAPSULE BY MOUTH TWICE A DAY 60 capsule 0    dicyclomine (BENTYL) 10 MG capsule TAKE 1 CAPSULE BY MOUTH TWICE A DAY AS NEEDED FOR ABDOMINAL SPASMS 60 capsule 0    topiramate (TOPAMAX) 100 MG tablet TAKE 1 TABLET BY MOUTH NIGHTLY  90 tablet 2    metFORMIN (GLUCOPHAGE) 1000 MG tablet TAKE 1 TAB BY MOUTH TWICE A DAY ( IN THE MORNING AND BEDTIME )  180 tablet 3    blood glucose test strips (ONETOUCH ULTRA) strip TEST TWO (2) TO THREE (3) TIMES A DAY & AS NEEDED FOR SYMPTOMS OF IRREGULAR BLOOD GLUCOSE 100 strip 0    Blood Glucose Monitoring Suppl (ONE TOUCH ULTRA 2) w/Device KIT       atorvastatin (LIPITOR) 40 MG tablet Take 1 tablet by mouth daily 90 tablet 3    BiPAP Machine MISC repair/replace Bipap maintain 18/9CMH2O, Cflex=3,mask,tubing,filters,headgear,heated humidifier,all supplies PRN      Psyllium (METAMUCIL PO) Take by mouth 3 times daily Takes powder      blood glucose test strips (TRUE METRIX BLOOD GLUCOSE TEST) strip THREE TIMES A  each 3    albuterol (PROVENTIL) (2.5 MG/3ML) 0.083% nebulizer solution Take 3 mLs by nebulization every 6 hours as needed for Wheezing 120 each 3    ULTICARE MINI PEN NEEDLES 31G X 6 MM MISC USE AS DIRECTED  100 each 5    gabapentin (NEURONTIN) 300 MG capsule Take 1 capsule by mouth 3 times daily for 30 days. (Patient taking differently: Take 300 mg by mouth 2 times daily. ) 90 capsule 2    nystatin (MYCOSTATIN) 384635 UNIT/GM powder Apply 3 times daily. 3 Bottle 3    lidocaine (LMX) 4 % cream Apply topically every 8 hrs as needed for pain 45 g 3    Lift Chair MISC by Does not apply route 1 each 0    Misc. Devices (ADJUST BATH/SHOWER SEAT/BACK) MISC Use daily for shower 1 each 0    Alcohol Swabs (B-D SINGLE USE SWABS REGULAR) PADS THREE TIMES A  each 0    nitroGLYCERIN (NITROSTAT) 0.4 MG SL tablet 1 under the tongue as needed for angina, may repeat q5mins for up three doses      Blood Glucose Monitoring Suppl HARMEET Use daily to check BS 1 Device 0    ipratropium-albuterol (DUONEB) 0.5-2.5 (3) MG/3ML SOLN nebulizer solution Inhale 3 mLs into the lungs every 4 hours 360 mL 2    Melatonin 10 MG TABS Take 10 mg by mouth nightly 90 tablet 3    Compression Stockings MISC by Does not apply route Pressure between 20 - 25 . 1 each 0    SYMBICORT 160-4.5 MCG/ACT AERO   2 puffs As needed for sob      OXYGEN Inhale 3 L into the lungs        No current facility-administered medications for this encounter. Review of Systems   Constitutional: Negative. Negative for fever. HENT: Negative. Eyes: Negative. Respiratory: Negative. Cardiovascular: Negative. Musculoskeletal: Positive for back pain. Objective:  General Appearance: In no acute distress and ill-appearing. Vital signs: (most recent): Blood pressure 113/68, pulse 83, temperature 97.3 °F (36.3 °C), resp. rate 16, height 5' 2\" (1.575 m), weight 239 lb (108.4 kg), SpO2 90 %, not currently breastfeeding. Vital signs are normal.  No fever. Output: Producing urine and producing stool. Lungs:  Normal effort. She is not in respiratory distress. Heart: Normal rate. Neurological: Patient is alert and oriented to person, place and time.       Assessment & Plan 69-year-old female with a past medical history significant for morbid obesity with BMI above 43, COPD with recent hospitalization  She is seen in this clinic for history of chronic low back pain  Reports radiation of pain down both legs  No previous lumbar spine surgical history  She is not interested in neurosurgery evaluation  She is mostly dependent on a scooter but is able to stand up and walk for short distances  Patient states that she had a bad experience with an injection in the past and is not interested in any interventional pain procedures  She is not interested in physical therapy  Patient is on chronic opioid therapy  Reports no side effects  Continue to report high pain score  Average pain score 2/84  Denies any illicit substance use  Medication Refill: Oxycodone    1. Chronic bilateral low back pain with bilateral sciatica    2. Morbid obesity with BMI of 40.0-44.9, adult ([address]    3. Lumbar radiculopathy, chronic    4. Sacroiliitis, not elsewhere classified ([address]    5. Lumbosacral spondylosis without myelopathy    6. DDD (degenerative disc disease), cervical        Patient is at high risk for opioid related respiratory depression considering risk factors of age, obesity and COPD history    She is on chronic opioid therapy and was prescribed 100 tablets of Percocet a month  Explained to her that I will continue her medication at 3 times a day but will not provide the 10 extra tablets  I will give her a 90 tablets a prescription for a month    Explained to patient that participation in physical therapy is required as a multimodal approach to attempt dependence and reliance on opioid is the sole modality of treatment    We will provide her a referral for physical therapy  Patient is instructed to at least restart therapy by next month  If she do not start therapy.   Continue to wean her off opioids and will decrease the Percocet to twice a day    Unfortunately patient is very unhappy with the decision of reducing opioid tablet count from 100 tablet a month to 90 tablets  She left very unhappy    Orders Placed This Encounter   Procedures    Ambulatory referral to Physical Therapy      Orders Placed This Encounter   Medications    oxyCODONE-acetaminophen (PERCOCET) 5-325 MG per tablet     Sig: Take 1 tablet by mouth every 8 hours as needed for Pain for up to 30 days. Intended supply: 30 days. Dispense:  90 tablet     Refill:  0     Reduce doses taken as pain becomes manageable      Controlled Substance Monitoring:    Acute and Chronic Pain Monitoring:   RX Monitoring 6/2/2022   Attestation -   Acute Pain Prescriptions -   Periodic Controlled Substance Monitoring Possible medication side effects, risk of tolerance/dependence & alternative treatments discussed. ;No signs of potential drug abuse or diversion identified. ;Assessed functional status. Chronic Pain > 50 MEDD Obtained or confirmed \"Consent for Opioid Use\" on file.    Chronic Pain > 80 MEDD -               Electronically signed by Jamar Lyn MD on 6/2/2022 at 10:47 AM

## 2022-06-03 NOTE — H&P
HISTORY and [facility] [address]       NAME:  Flori Hinson  MRN: [id number]   YOB: 1950   Date: 3/2/2021   Age: 69 y.o. Gender: female     COMPLAINT AND PRESENT HISTORY:   Flori Hinson is 69 y.o.,  female, undergoing EGD, DIAGNOSTIC COLONOSCOPY for IBS W/CONSTIPATION AND DIARRHEA, ADENOMATOUS POLYP OF ASCENDING COLON, MORBID OBESITY, GERD, ABD BLOATING per Dr. Herminio Osman. Patient c/o muscle spasms, cramping, pain in ABD- generalized, notes significant ABD bloating that waxes and wanes, also notes epigastric pain/pressure. Feels like stomach could \"pop\". Bowels are alternating between constipation/diarrhea. Denies blood in stool, take iron pill- stools black at times. + nausea, no vomiting, denies hematemesis. Denies fever/chills/weight loss, + appetite loss. Denies dysphagia, voice change, pharyngitis. Hx of prior colonoscopy w/polyp, as well as EGD. Also c/o lower ABD pain to LLQ and RLQ. Hx of GERD, gastritis. Review of additional significant medical hx:   COPD, ASTHMA, HOME OXYGEN THERAPY, EMPHYSEMA, HIGINIO: Notes SOB w/exertion, denies wheezing currently, occasional dry cough. Following w/Dr. Johnathan Palumbo- uses oxygen as needed at this time (2.5 L). She does use CPAP nightly. Current medications r/t condition: ALBUTEROL AND DUONEB NEBULIZERS PRN, SYMBICORT    DM II: BS this morning was 57, drank small amount of orange juice at 730 (about 3 oz)- when she re-checked BS it was 141. She has had hypoglycemic events in the past, has also had hyperglycemia. Current medications r/t condition: 50 U LANTUS BID, METFORMIN, NOVOLOG PER SS PRIOR TO EACH MEAL. Lab Results   Component Value Date    LABA1C 7.8 02/25/2021     Lab Results   Component Value Date     10/31/2019     CAD, HTN, A-FIB, CHF: She does follow w/cardiology- Dr. Eyad Pate. Denies chest pain, palpitations, dizziness, leg swelling, headache. Hx of loop recorder- still present.  Hx of cardiac ablation in 2000. Current medications r/t condition: LIPITOR, COREG, LOSARTAN, LASIX, VASCEPA, ISORDIL, NITRO PRN  BP Readings from Last 3 Encounters:   02/25/21 122/74   01/26/21 (!) 143/74   11/25/20 128/78     Nuc stress [address]  Component Name Value Ref Range   Target  bpm   TID 1.38     Nuc Stress EF 67 %   End diastolic index (mL/m2) 33 mL/m2   End systolic index (mL/m2) 11 mL/m2   HR 76 bpm   HR 94 bpm   Percent HR 63 %   HR 85 bpm   Baseline Systolic  mmHg   Baseline Diastolic BP 95 mmHg   Stress peak diastolic BP 80 mmHg   Stress peak systolic  mmHg   Systolic  mmHg   Diastolic BP 75 mmHg   Other Result Information   This result has an attachment that is not available. Result Narrative     Stress ECG: The stress ECG was negative for ischemia.   Stress: The patient reported dizziness and shortness of breath during   the stress test.  No anginal chest pain.   Stress ECG: There were no arrhythmias during stress.   Perfusion Defect: Small size, mild to moderate perfusion defect noted   involving apex on both stress and resting nuclear images suggestive of   focal artifact and doubt any infarction given no wall motion   abnormalities.  No definite large areas of reversible perfusion defects   seen suggestive of no significant stress-induced myocardial ischemia.   Stress Function Comments: Post-stress ejection fraction is 67 %.   Perfusion Comments: Based on the perfusion study data, risk of   cardiovascular events is low risk. STROKE: Denies any deficits from strokes- states hx of mini strokes. NPO status: DRANK ABOUT 3 OZ OF ORANGE JUICE AROUND 7:30 AM (NO PULP), OTHERWISE NPO SINCE MIDNIGHT. Medications taken TODAY: NONE TAKEN TODAY- NO INSULIN SINCE LAST NIGHT. Anticoagulation status: PLAVIX AND ASA- HELD X7 DAYS. Prep fully completed: YES- NOTED SOME BLUE COLORED STOOL, THEN GOT LIGHTER.     Pertinent family hx: Denies family hx Minutes per session: None    Stress: None   Relationships    Social connections     Talks on phone: None     Gets together: None     Attends Mosque service: None     Active member of club or organization: None     Attends meetings of clubs or organizations: None     Relationship status: None    Intimate partner violence     Fear of current or ex partner: None     Emotionally abused: None     Physically abused: None     Forced sexual activity: None   Other Topics Concern    None   Social History Narrative    None       REVIEW OF SYSTEMS      Allergies   Allergen Reactions    Robitussin [Guaifenesin] Anaphylaxis    Codeine Swelling    Compazine [Prochlorperazine Maleate] Hives and Swelling    Iodides Itching    Morphine Other (See Comments)     hallucinations    Moxifloxacin      Other reaction(s): Intolerance-unknown  Other reaction(s): Intolerance-unknown    Reglan [Metoclopramide] Nausea Only    Avelox [Moxifloxacin Hcl In Nacl] Rash     Dermatitis      Cipro Xr Rash     dermatitis    Sulfa Antibiotics Rash       No current facility-administered medications on file prior to encounter. Current Outpatient Medications on File Prior to Encounter   Medication Sig Dispense Refill    polyethylene glycol (MIRALAX) 17 GM/SCOOP powder Use as Directed per instructions provided by physician office.  238 g 0    bisacodyl (DULCOLAX) 5 MG EC tablet Use as directed per instructions provided by physician office 4 tablet 0    blood glucose test strips (TRUE METRIX BLOOD GLUCOSE TEST) strip THREE TIMES A  each 3    ferrous sulfate (IRON 325) 325 (65 Fe) MG tablet TAKE 1 TAB BY MOUTH EVERY MORNING  90 tablet 5    citalopram (CELEXA) 40 MG tablet TAKE 1 2 TABLET BY MOUTH TWICE A DAY (Patient taking differently: Take 20 mg by mouth 2 times daily TAKE 1/ 2 TABLET BY MOUTH TWICE A DAY) 90 tablet 3    carvedilol (COREG) 3.125 MG tablet TAKE 1 TAB BY MOUTH TWICE A DAY ( IN THE MORNING AND BEDTIME )  180 tablet 3    losartan (COZAAR) 25 MG tablet TAKE 1 TAB BY MOUTH ONCE A DAY ( IN THE MORNING ) 90 tablet 5    magnesium oxide (MAG-OX) 400 MG tablet TAKE 1 TAB BY MOUTH TWICE A DAY ( IN THE MORNING AND BEDTIME ) 180 tablet 3    LANTUS SOLOSTAR 100 UNIT/ML injection pen INJECT 42 UNITS INTO THE SKIN 2 TIMES DAILY  15 mL 3    topiramate (TOPAMAX) 100 MG tablet Take 1 tablet by mouth nightly 90 tablet 2    dicyclomine (BENTYL) 10 MG capsule Take 1 capsule by mouth 2 times daily as needed (abdominal spasm) 180 capsule 0    albuterol (PROVENTIL) (2.5 MG/3ML) 0.083% nebulizer solution Take 3 mLs by nebulization every 6 hours as needed for Wheezing 120 each 3    furosemide (LASIX) 20 MG tablet TAKE 1 TAB BY MOUTH ONCE A DAY AS NEEDED ( WEIGHT GAIN 3 LBS OVERNIGHT )  30 tablet 3    clopidogrel (PLAVIX) 75 MG tablet TAKE 1 TAB BY MOUTH ONCE A DAY ( IN THE MORNING ) -THIS MEDICINE MAY BE TAKENWITH OR WITHOUT FOOD  90 tablet 1    zoster recombinant adjuvanted vaccine (SHINGRIX) 50 MCG/0.5ML SUSR injection 50 MCG IM then repeat 2-6 months. 0.5 mL 1    Icosapent Ethyl (VASCEPA) 1 g CAPS capsule Take 1 capsule by mouth 2 times daily 60 capsule 3    vitamin B-12 (CYANOCOBALAMIN) 100 MCG tablet Take 50 mcg by mouth daily      isosorbide dinitrate (ISORDIL) 30 MG tablet Take 30 mg by mouth      ULTICARE MINI PEN NEEDLES 31G X 6 MM MISC USE AS DIRECTED  100 each 5    metFORMIN (GLUCOPHAGE) 1000 MG tablet TAKE 1 TAB BY MOUTH TWICE A DAY ( IN THE MORNING AND BEDTIME )  180 tablet 3    gabapentin (NEURONTIN) 300 MG capsule Take 1 capsule by mouth 3 times daily for 30 days. 90 capsule 2    nystatin (MYCOSTATIN) 550083 UNIT/GM powder Apply 3 times daily. 3 Bottle 3    insulin aspart (NOVOLOG FLEXPEN) 100 UNIT/ML injection pen If <139 - No Insulin; 140-199-2 Units;200-249-4 Units;250-299-6 Units;300-349-8 Units;350-400=10 Units; Above 400-12 Units 5 pen 3    aspirin 81 MG chewable tablet Take 1 tablet by mouth daily 90 tablet 3    lidocaine (LMX) 4 % cream Apply topically every 8 hrs as needed for pain 45 g 3    Lift Chair MISC by Does not apply route 1 each 0    Misc. Devices (ADJUST BATH/SHOWER SEAT/BACK) MISC Use daily for shower 1 each 0    pantoprazole (PROTONIX) 40 MG tablet Take 1 tablet by mouth 2 times daily 60 tablet 3    Alcohol Swabs (B-D SINGLE USE SWABS REGULAR) PADS THREE TIMES A  each 0    nitroGLYCERIN (NITROSTAT) 0.4 MG SL tablet 1 under the tongue as needed for angina, may repeat q5mins for up three doses      Blood Glucose Monitoring Suppl HARMEET Use daily to check BS 1 Device 0    ipratropium-albuterol (DUONEB) 0.5-2.5 (3) MG/3ML SOLN nebulizer solution Inhale 3 mLs into the lungs every 4 hours 360 mL 2    Melatonin 10 MG TABS Take 10 mg by mouth nightly 90 tablet 3    Compression Stockings MISC by Does not apply route Pressure between 20 - 25 . 1 each 0    docusate sodium (COLACE) 100 MG capsule Take 1 capsule by mouth 2 times daily Please use while taking Percocet 30 capsule 0    SYMBICORT 160-4.5 MCG/ACT AERO   2 puffs As needed for sob      OXYGEN Inhale 3 L into the lungs        (Notation: Medications listed above are not currently reconciled at the signing of this H&P note, to be reconciled in pre-op per RN)    Review of Systems   Constitutional: Negative for chills and fever. HENT: Negative for congestion, ear pain, postnasal drip, rhinorrhea and sore throat. Respiratory: Positive for shortness of breath. Negative for cough and wheezing. Cardiovascular: Negative for chest pain, palpitations and leg swelling. Gastrointestinal: Positive for abdominal distention, abdominal pain, constipation, diarrhea and nausea. Negative for blood in stool and vomiting. Genitourinary: Negative. Neurological: Positive for dizziness (At time, denies currently). Negative for syncope. Hematological: Does not bruise/bleed easily.        GENERAL PHYSICAL EXAM     Vitals: Review current vital signs per RN flow sheet. GENERAL APPEARANCE:   Beka De La Rosa is 69 y.o.,  female, severely obese, nourished, conscious, alert. Does not appear to be in any distress or pain at this time. Physical Exam   Constitutional: She is oriented to person, place, and time. She appears well-developed and well-nourished. Non-toxic appearance. She does not have a sickly appearance. She does not appear ill. No distress. HENT:   Head: Normocephalic. Right Ear: External ear normal.   Left Ear: External ear normal.   Mouth/Throat: Oropharynx is clear and moist and mucous membranes are normal. No oropharyngeal exudate, posterior oropharyngeal edema, posterior oropharyngeal erythema or tonsillar abscesses. Eyes: Conjunctivae are normal. Right eye exhibits no discharge. Left eye exhibits no discharge. Cardiovascular: Normal rate, regular rhythm, normal heart sounds and intact distal pulses. Pulses:       Radial pulses are 2+ on the right side and 2+ on the left side. Dorsalis pedis pulses are 2+ on the right side and 2+ on the left side. Posterior tibial pulses are 2+ on the right side and 2+ on the left side. Pulmonary/Chest: Effort normal. No respiratory distress. She has decreased breath sounds in the right lower field and the left lower field. She has wheezes (Slight, expiratory LLF posterior) in the left lower field. She has no rhonchi. She has no rales. Abdominal: Soft. Bowel sounds are normal. She exhibits no distension and no mass. There is generalized abdominal tenderness. There is no rebound and no guarding. Musculoskeletal: Normal range of motion. Right lower leg: She exhibits no tenderness and no swelling. Left lower leg: She exhibits no tenderness and no swelling. Neurological: She is alert and oriented to person, place, and time. Gait (Utilizing w/c) abnormal.   Skin: Skin is warm and dry. She is not diaphoretic.    Psychiatric: She has a normal mood and affect.  Her behavior is normal.       RECENT LAB WORK     Lab Results   Component Value Date     11/01/2020    K 5.0 11/01/2020     11/01/2020    CO2 26 11/01/2020    BUN 21 11/01/2020    CREATININE 1.23 (H) 11/01/2020    GLUCOSE 172 (H) 11/01/2020    CALCIUM 8.4 (L) 11/01/2020    PROT 6.9 10/30/2020    LABALBU 4.0 10/30/2020    BILITOT 0.20 (L) 10/30/2020    ALKPHOS 62 10/30/2020    AST 21 10/30/2020    ALT 29 10/30/2020    LABGLOM 43 (L) 11/01/2020    GFRAA 52 (L) 11/01/2020    GLOB NOT REPORTED 10/23/2019     Lab Results   Component Value Date    WBC 7.8 10/30/2020    HGB 13.6 10/30/2020    HCT 42.6 10/30/2020    MCV 95.9 10/30/2020     10/30/2020     PROVISIONAL DIAGNOSES / SURGERY:      IBS W/CONSTIPATION AND DIARRHEA, ADENOMATOUS POLYP OF ASCENDING COLON, MORBID OBESITY, GERD, ABD BLOATING    EGD, DIAGNOSTIC COLONOSCOPY     Patient Active Problem List    Diagnosis Date Noted    Type 2 diabetes mellitus with diabetic polyneuropathy, with long-term current use of insulin ([facility])      Priority: High    Nonintractable migraine, unspecified migraine type      Priority: Low    Chronic pain of left knee 04/21/2011     Priority: Low    Gastroesophageal reflux disease 01/26/2021    Morbid obesity ([facility] [address] 01/26/2021    Adenomatous polyp of ascending colon 01/26/2021    Irritable bowel syndrome with both constipation and diarrhea 01/26/2021    Abdominal bloating 01/26/2021    Sessile colonic polyp 06/25/2020    Lumbar radiculopathy, chronic     Long term (current) use of insulin ([facility] [address] 11/07/2019    Major depressive disorder, single episode, unspecified 11/07/2019    Permanent atrial fibrillation ([address] 11/07/2019    Polyneuropathy, unspecified 11/07/2019    Other chronic pain 11/07/2019    Opioid use, unspecified, uncomplicated 46/66/3001    Episode of altered cognition     Benign hypertension with CKD (chronic kidney disease) stage III     Secondary diabetes mellitus with stage no

## 2022-06-20 DIAGNOSIS — D64.9 ANEMIA, UNSPECIFIED TYPE: ICD-10-CM

## 2022-06-20 RX ORDER — FERROUS SULFATE 325(65) MG
TABLET ORAL
Qty: 90 TABLET | Refills: 2 | Status: SHIPPED | OUTPATIENT
Start: 2022-06-20

## 2022-06-22 RX ORDER — PEN NEEDLE, DIABETIC 29 G X1/2"
NEEDLE, DISPOSABLE MISCELLANEOUS
Qty: 100 EACH | Refills: 5 | Status: SHIPPED | OUTPATIENT
Start: 2022-06-22 | End: 2022-07-25

## 2022-06-23 ENCOUNTER — TELEPHONE (OUTPATIENT)
Dept: GASTROENTEROLOGY | Age: 72
End: 2022-06-23

## 2022-06-28 ENCOUNTER — HOSPITAL ENCOUNTER (OUTPATIENT)
Dept: PHYSICAL THERAPY | Age: 72
Setting detail: THERAPIES SERIES
Discharge: HOME | End: 2022-06-28
Payer: COMMERCIAL

## 2022-06-28 PROCEDURE — 97162 PT EVAL MOD COMPLEX 30 MIN: CPT

## 2022-06-28 NOTE — CONSULTS
[facility]   Outpatient Physical Therapy  Physical Therapy Lumbar Evaluation    Date:  2022  Patient: Ryann Aguilar  : 1950  MRN: [id number]  Physician: Washington Levin MD  Insurance: [employer]. Auth required after initial evaluation  Medical Diagnosis:   M54.42, M54.41, G89.29 (ICD-10-CM) - Chronic bilateral low back pain with bilateral sciatica   E66.01, Z68.41 (ICD-10-CM) - Morbid obesity with BMI of 40.0-44.9, adult [facility]   M47.817 (ICD-10-CM) - Lumbosacral spondylosis without myelopathy     Rehab Codes: M54.42, M54.41, R53.1, R26.9  Onset Date:     Next 's appt: 22    Subjective:  Goes by Kiara  CC: Chronic LBP radiating into B LEs, frequent spasms in B legs  HPI: Pt reports chronic longstanding hx of back pain radiating into B LEs with insidious onset, no known mechanism of injury, gradually worsening over time. Pt has an extensive PMHx and multiple comorbidities listed below, with worsening functional mobility over time and now mostly dependent on a power wheelchair aside from short distance walking with a rollator walker. Pt is established with pain management and has been on chronic opioids for quite some time, recently tapered down to 90 tablets/month. Reports history of prior injections in her lumbar spine but is no longer interested in additional injections due to a bad experience with one injection \"hitting a nerve. \" Pt states that prior imaging (many years ago) has shown significant degenerative changes in her lower back and was recommended to undergo a neurosurgery consult but pt states that she is not interested in neurosurgical intervention at this time. Was recently referred to PT for continued conservative mgmt and pt admits that she has lost a considerable amount of strength in her legs.  Able to ambulate short distances with her rollator walker but has recently struggled with even walking short distances secondary to fatigue, weakness and recently more \"cramping in her legs. \" Reports good success previously in PT many years ago and would like to try and get a little stronger. Of note, pt also has fairly significant COPD and emphysema, using supplemental oxygen 2 LPM at home but did not come to therapy with her portable oxygen unit today. PMHx: [] Unremarkable [x] Diabetes [x] HTN  [] Pacemaker   [x] MI/Heart Problems (CAD, CHF, a-fib) [] Cancer [x] Arthritis [x] Other: GERD, mumps, R CTS, hx of bladder incontinence, chronic migraines              [x] Refer to full medical chart  In EPIC     Comorbidities:   [x] Obesity (BMI 37) [] Dialysis  [x] Other: CKD3   [x] Asthma/COPD (On supplemental home O2- 2 LPM) [] Dementia [x] Other: ICM placement   [x] Stroke [] Sleep apnea [] Other:    [x] Vascular disease [] Rheumatic disease [] Other:     Preferred Language:   [x] English           [] Other:    Prior Imaging: No recent imaging seen in chart    Previous Treatment: Hx of a prior injection in pt's lower back which she describes as a bad experience and has not been interested in any further injections since. Chronic opioids prescribed by pain mgmt. Recent home health PT a few months ago. Remote hx of outpt PT many years ago. Home Environment: Pt lives in a senior living community with an accessible 8th floor apartment with an elevator. Has a hospital bed and handicap accessible apartment. Uses a rollator walker and power wheelchair for mobility. Uses supplemental oxygen at home- 2 LPM via nasal cannula    Medications: [] Refer to full medical record [] None [x] Other: Gabapentin and Percocet TID, topical analgesics   Allergies:      [x] Refer to full medical record  [] None [] Other:    Work Status: Retired    Prior Level of Function: Chronic longstanding hx of LBP radiating into B LEs but states that prior the last year, she was more mobile with her rollator walker and less reliant on her power wheelchair.      Pain:  [x] Yes  [] No Location: L low back, B LEs Pain Rating: (0-10 scale) 5/10  Pain altered Tx:  [] Yes  [x] No  Action:    Symptoms:  [] Improving [x] Worsening [] Same  Aggravating factors: Standing/walking for extended periods  Alleviating factors:  Medication, seated rest with legs elevated      Functional Status Previous level of function Current level of function Comments   Sitting [x] Independent  [] Deficit [x] Independent  [] Deficit    Standing/walking [] Independent  [x] Deficit [] Independent  [x] Deficit Uses power w/c in community. Limited to 5-6 minutes with walker   Driving [] Independent  [x] Deficit [] Independent  [x] Deficit Relies on transportation   Housekeeping/Meal Preparation [] Independent  [x] Deficit [] Independent  [x] Deficit Friends/family assists   Lifting/Carrying [] Independent  [x] Deficit [] Independent  [x] Deficit Limited in mobility. Uses power w/c or walker   Bending/Reaching [x] Independent  [] Deficit [] Independent  [x] Deficit Painful in back, requires UE support   Stair climbing [] Independent  [] Deficit [] Independent  [] Deficit Does not negotiate stairs   Pivoting [] Independent  [x] Deficit [] Independent  [x] Deficit Limited motion   Squatting [] Independent  [x] Deficit [] Independent  [x] Deficit Unable   Other [] Independent  [] Deficit [] Independent  [] Deficit      Patient Goals/Rehab Expectations:  Decrease spasms in legs, improve strength and mobility      Objective:      OBSERVATION No Deficit Deficit Not Tested Comments   Forward Head [] [x] []    Rounded Shoulders [] [x] []    Kyphosis [] [x] []    Lordosis [x] [] []    Scoliosis [] [] [x]    Innominate Alignment [] [] [x]    NEUROLOGICAL       Reflexes [] [] [x]    Compression/Distraction [] [] [x]    Sensation [] [x] [] Numbness in L LE at night   FALL RISK?  [] [x]     Comments:                                  Allyssa Munguia Fall Risk Assessment    Risk Factor Scale  Score   History of Falls [] Yes  [x] No 25  0 0   Secondary Diagnosis [] Yes  [x] No 15  0 0   Ambulatory Aid [] Furniture  [x] Crutches/cane/walker  [] None/bedrest/wheelchair/nurse 30  15  0 15   IV/Heparin Lock [] Yes  [x] No 20  0 0   Gait/Transferring [] Impaired  [x] Weak  [] Normal/bedrest/immobile 20  10  0 10   Mental Status [] Forgets limitations  [x] Oriented to own ability 15  0 0      Total: 25     Based on the Assessment score: check the appropriate box.     []  No intervention needed   Low =   Score of 0-24    [x]  Use standard prevention interventions Moderate =  Score of 24-44   [x] Give patient handout and discuss fall prevention strategies   [x] Establish goal of education for patient/family RE: fall prevention strategies    []  Use high risk prevention interventions High = Score of 45 and higher   [] Give patient handout and discuss fall prevention strategies   [] Establish goal of education for patient/family Re: fall prevention strategies   [] Discuss lifeline / other resources            Range of Motion  Left Range of Motion  Right Strength  Left Strength  Right   Lumbar Flexion 100% 100%     Lumbar Extension 50% 50%     Lumbar Rotation 75% 75%     Lumbar Side Bend 75% 75%     Hip Flexion   3/5 3+/5   Hip Abduction   3+/5 3+/5   Hip Adduction   3+/5 3+/5   Hip Extension   3/5 3/5   Hip ER       Hip IR       Knee Flexion   3/5 3+/5   Knee Extension   3/5 3+/5   Dorsiflexion       Plantar flexion       Inversion       Eversion       *All LE MMT performed in modified (seated) positioning    LUMBAR/SIJ SPECIAL TESTS Left Right   Standing Flexion + [x]         - []           NT []  + [x]         - []           NT []    Repeated Flexion + []         - []           NT [x]  + []         - []           NT [x]    Repeated Extension + []         - []           NT [x]  + []         - []           NT [x]    SLR + []         - []           NT [x]  + []         - []           NT [x]    Slump Test + []         - [x]           NT []  + []         - [x]           NT []    Prone Instability Test + []         - []           NT [x]  + []         - []           NT [x]    Anterior Gapping + []         - []           NT [x]  + []         - []           NT [x]    Posterior Gapping + []         - []           NT [x]  + []         - []           NT [x]    Sacral Thrust + []         - []           NT [x]  + []         - []           NT [x]    Thigh Thrust + []         - []           NT [x]  + []         - []           NT [x]    Gaenslen's + []         - []           NT [x]  + []         - []           NT [x]    Other:  + []         - []           NT []  + []         - []           NT []        Palpation: Hypertonic and TTP at L lumbar paraspinals    Gait: Independent []           Modified Independent [x]            Not independent []  Comments: Uses power wheelchair for most mobility tasks. Able to ambulate short distances with rollator walker, limited to 5 minutes max. Functional Testing:   Five Time Sit to Stand (FTSTS): 32 seconds with moderate UE support. Drop in SpO2 to 88%  Timed Up and Go (TUG): 42.5 seconds with 2WW. Drop in SpO2 to 86%   6MWT: assess at future visit when pt has her supplemental oxygen      Assessment:  Problems:    [x] ? Pain:  [x] ? ROM:  [x] ? Strength:  [x] ? Function:  [] Other:    Pt is a 67 y/o F referred to PT with an extensive PMHx and a chronic longstanding hx of LBP radiating into B LEs. Pt presents to PT evaluation today in a power wheelchair with significantly impaired functional mobility. Able to ambulate short household distances with a walker but demonstrates extensive weakness in B LEs and endurance issues which is primarily her limiting factor with all ambulation tasks. Pt uses supplemental oxygen as needed at home but did not come to PT evaluation with her portable oxygen unit, requiring rest and cues for diaphragmatic breathing between assessment pieces due to dropping SpO2 into the 80s.  Good recovery in 1-2 minutes time after standing tasks, and educated pt that she should be bringing her portable oxygen for all future appts. Also encouraged pt to obtain a pulse oximeter for home use to monitor as needed. Skilled PT intervention is required to help modulate pain and spasms in B legs, improve B LE strength and functional endurance necessary to maximize independence and safety through all ADL and community activities. Initial written HEP deferred to a future follow up visit as today's visit was authorized for evaluation only per insurance parameters. STG: (to be met in 6 treatments)  1. Pt will self report worst pain no greater than 3/10 in order to better tolerate ADLs/work activities with minimal dysfunction  2. Pt will complete FTSTS in <20 seconds with minimal UE support or less maintaining SpO2 at 90% or greater to show improved efficiency with functional transitions in home and reduced fall risk  LTG: (to be met in 12 treatments)  1. Pt will demonstrate improved B LE strength to 4/5 or greater in order to demonstrate improved stability/strength necessary for unrestricted ADLs/work activities  2. Pt will complete TUG in <20 seconds with 2WW maintaining SpO2 at 90% or greater to show improved safety and efficiency ambulating in home with reduced risk for falls  3. Pt will decrease mod SUSAN to 20% impaired or less in order to demonstrate improved functional tolerances at PLOF with minimal restriction/dysfunction  4.  Pt will demonstrate independence with a long term HEP for continued progress/maintenance after completion of PT      Functional Assessment Used: Mod. SUSAN  Current Status Score: 56% impaired  Goal Status Score: 20% impaired    Evaluation Complexity:  History (Personal factors, comorbidities) [] 0 [] 1-2 [x] 3+   Exam (limitations, restrictions) [] 1-2 [x] 3 [] 4+   Clinical presentation (progression) [] Stable [x] Evolving  [] Unstable   Decision Making [] Low [x] Moderate [] High    [] Low Complexity [x] Moderate Complexity [] High Complexity     Rehab Potential:  [x] Good  [] Fair  [] Poor   Suggested Professional Referral:  [x] No  [] Yes:  Barriers to Goal Achievement[de-identified]  [x] No  [] Yes:  Domestic Concerns:  [x] No  [] Yes:    Pt. Education:  [x] Plans/Goals, Risks/Benefits discussed  [] Home exercise program  Method of Education: [x] Verbal  [x] Demo  [] Written  Comprehension of Education:  [x] Verbalizes understanding. [x] Demonstrates understanding. [] Needs Review. [] Demonstrates/verbalizes understanding of HEP/Ed previously given. Treatment Plan:  [x] Therapeutic Exercise   70358  [] Iontophoresis: 4 mg/mL Dexamethasone Sodium Phosphate  mAmin  56456   [x] Therapeutic Activity  56655 [] Vasopneumatic cold with compression  86633    [x] Gait Training   58644 [] Ultrasound   72921   [x] Neuromuscular Re-education  96911 [] Electrical Stimulation Unattended  79641   [x] Manual Therapy  39004 [] Electrical Stimulation Attended  12529   [x] Instruction in HEP  [] Lumbar/Cervical Traction  47898   [] Aquatic Therapy   98122 [] Cold/hotpack    [] Massage   32897      [] Dry Needling, 1 or 2 muscles  96746   [] Biofeedback, first 15 minutes   07666  [] Biofeedback, additional 15 minutes   11851 [] Dry Needling, 3 or more muscles  91867          Frequency: 2x/week for 12 visits    Todays Treatment:  Modalities:   Precautions: Fall risk, requires SBA for safety for all standing tasks. Monitor SpO2 with exertion (pt has COPD and emphysema) and try to maintain at 90% or above through all activities. Exercises:  Exercise Reps/ Time Weight/ Level Comments                                 Other:    Specific Instructions for next treatment: Emphasis on functional, progressive B LE strengthening and endurance as appropriate. Incorporate both static and dynamic balance interventions as appropriate. Monitor exertion response and SpO2, adjusting intensity as needed.     Treatment Charges: Mins Units   [x] Evaluation       []  Low       [x] Moderate       []  High 57 1   []  Modalities     []  Ther Exercise     []  Manual Therapy     []  Ther Activities     []  Aquatics     []  Neuromuscular     []  Gait Training     []  Dry Needling           1-2 muscles     []  Dry Needling           3 or more muscles     [] Vasocompression     []  Other       TOTAL TREATMENT TIME: 57    Time in: 2:50pm    Time Out: 3:47pm    Electronically signed by: Nicolle Roy PT        Physician Signature:________________________________Date:__________________  By signing above or cosigning this note, I have reviewed this plan of care and certify a need for medically necessary rehabilitation services.      *PLEASE SIGN ABOVE AND FAX BACK ALL PAGES*

## 2022-07-05 ENCOUNTER — TELEPHONE (OUTPATIENT)
Dept: PAIN MANAGEMENT | Age: 72
End: 2022-07-05

## 2022-07-06 DIAGNOSIS — M46.1 SACROILIITIS, NOT ELSEWHERE CLASSIFIED (HCC): Chronic | ICD-10-CM

## 2022-07-06 DIAGNOSIS — M47.817 LUMBOSACRAL SPONDYLOSIS WITHOUT MYELOPATHY: Chronic | ICD-10-CM

## 2022-07-06 DIAGNOSIS — M54.16 LUMBAR RADICULOPATHY, CHRONIC: ICD-10-CM

## 2022-07-06 DIAGNOSIS — M50.30 DDD (DEGENERATIVE DISC DISEASE), CERVICAL: Chronic | ICD-10-CM

## 2022-07-06 RX ORDER — OXYCODONE HYDROCHLORIDE AND ACETAMINOPHEN 5; 325 MG/1; MG/1
1 TABLET ORAL EVERY 8 HOURS PRN
Qty: 21 TABLET | Refills: 0 | Status: SHIPPED | OUTPATIENT
Start: 2022-07-06 | End: 2022-07-13

## 2022-07-08 ENCOUNTER — OFFICE VISIT (OUTPATIENT)
Dept: PRIMARY CARE CLINIC | Age: 72
End: 2022-07-08
Payer: COMMERCIAL

## 2022-07-08 VITALS
HEART RATE: 82 BPM | OXYGEN SATURATION: 93 % | DIASTOLIC BLOOD PRESSURE: 58 MMHG | BODY MASS INDEX: 45.54 KG/M2 | WEIGHT: 249 LBS | TEMPERATURE: 97.3 F | SYSTOLIC BLOOD PRESSURE: 111 MMHG

## 2022-07-08 DIAGNOSIS — G89.29 CHRONIC BILATERAL LOW BACK PAIN WITH BILATERAL SCIATICA: ICD-10-CM

## 2022-07-08 DIAGNOSIS — E66.01 MORBID OBESITY WITH BMI OF 40.0-44.9, ADULT (HCC): ICD-10-CM

## 2022-07-08 DIAGNOSIS — F32.A ANXIETY AND DEPRESSION: ICD-10-CM

## 2022-07-08 DIAGNOSIS — M54.42 CHRONIC BILATERAL LOW BACK PAIN WITH BILATERAL SCIATICA: ICD-10-CM

## 2022-07-08 DIAGNOSIS — I50.32 CHRONIC DIASTOLIC CONGESTIVE HEART FAILURE (HCC): ICD-10-CM

## 2022-07-08 DIAGNOSIS — Z79.4 TYPE 2 DIABETES MELLITUS WITH DIABETIC POLYNEUROPATHY, WITH LONG-TERM CURRENT USE OF INSULIN (HCC): Primary | ICD-10-CM

## 2022-07-08 DIAGNOSIS — J44.1 CHRONIC OBSTRUCTIVE PULMONARY DISEASE WITH ACUTE EXACERBATION (HCC): ICD-10-CM

## 2022-07-08 DIAGNOSIS — F41.9 ANXIETY AND DEPRESSION: ICD-10-CM

## 2022-07-08 DIAGNOSIS — E11.42 TYPE 2 DIABETES MELLITUS WITH DIABETIC POLYNEUROPATHY, WITH LONG-TERM CURRENT USE OF INSULIN (HCC): Primary | ICD-10-CM

## 2022-07-08 DIAGNOSIS — M54.41 CHRONIC BILATERAL LOW BACK PAIN WITH BILATERAL SCIATICA: ICD-10-CM

## 2022-07-08 LAB — HBA1C MFR BLD: 11.8 %

## 2022-07-08 PROCEDURE — 1123F ACP DISCUSS/DSCN MKR DOCD: CPT | Performed by: NURSE PRACTITIONER

## 2022-07-08 PROCEDURE — 1090F PRES/ABSN URINE INCON ASSESS: CPT | Performed by: NURSE PRACTITIONER

## 2022-07-08 PROCEDURE — 3017F COLORECTAL CA SCREEN DOC REV: CPT | Performed by: NURSE PRACTITIONER

## 2022-07-08 PROCEDURE — 99214 OFFICE O/P EST MOD 30 MIN: CPT | Performed by: NURSE PRACTITIONER

## 2022-07-08 PROCEDURE — 83036 HEMOGLOBIN GLYCOSYLATED A1C: CPT | Performed by: NURSE PRACTITIONER

## 2022-07-08 PROCEDURE — 2022F DILAT RTA XM EVC RTNOPTHY: CPT | Performed by: NURSE PRACTITIONER

## 2022-07-08 PROCEDURE — 1036F TOBACCO NON-USER: CPT | Performed by: NURSE PRACTITIONER

## 2022-07-08 PROCEDURE — G8399 PT W/DXA RESULTS DOCUMENT: HCPCS | Performed by: NURSE PRACTITIONER

## 2022-07-08 PROCEDURE — G8417 CALC BMI ABV UP PARAM F/U: HCPCS | Performed by: NURSE PRACTITIONER

## 2022-07-08 PROCEDURE — 3046F HEMOGLOBIN A1C LEVEL >9.0%: CPT | Performed by: NURSE PRACTITIONER

## 2022-07-08 PROCEDURE — 3023F SPIROM DOC REV: CPT | Performed by: NURSE PRACTITIONER

## 2022-07-08 PROCEDURE — G8427 DOCREV CUR MEDS BY ELIG CLIN: HCPCS | Performed by: NURSE PRACTITIONER

## 2022-07-08 RX ORDER — FLASH GLUCOSE SENSOR
1 KIT MISCELLANEOUS WEEKLY
Qty: 6 EACH | Refills: 5 | Status: SHIPPED | OUTPATIENT
Start: 2022-07-08 | End: 2022-07-22

## 2022-07-08 RX ORDER — CITALOPRAM 20 MG/1
TABLET ORAL
COMMUNITY
Start: 2022-06-20 | End: 2022-07-25

## 2022-07-08 RX ORDER — PANTOPRAZOLE SODIUM 40 MG/1
TABLET, DELAYED RELEASE ORAL
COMMUNITY
Start: 2022-06-20 | End: 2022-07-18

## 2022-07-08 RX ORDER — FLASH GLUCOSE SCANNING READER
1 EACH MISCELLANEOUS 4 TIMES DAILY
Qty: 1 EACH | Refills: 2 | Status: SHIPPED | OUTPATIENT
Start: 2022-07-08 | End: 2022-07-25

## 2022-07-08 RX ORDER — DULAGLUTIDE 1.5 MG/.5ML
1.5 INJECTION, SOLUTION SUBCUTANEOUS WEEKLY
Qty: 4 PEN | Refills: 1 | Status: SHIPPED | OUTPATIENT
Start: 2022-07-08 | End: 2022-10-31

## 2022-07-08 RX ORDER — ESCITALOPRAM OXALATE 10 MG/1
10 TABLET ORAL DAILY
Qty: 90 TABLET | Refills: 1 | Status: SHIPPED | OUTPATIENT
Start: 2022-07-08 | End: 2022-08-15

## 2022-07-08 NOTE — PROGRESS NOTES
[facility]  [id number] [facility] [address]  Dept: [phone]  Dept Fax: [phone]    Patient Care Team:  VERONIKA Khalil CNP as PCP - General (Family Medicine)  VERONIKA Pérez CNP as PCP - REHABILITATION [facility] Empaneled Provider  Gerardo Hamilton MD as Consulting Physician (Urology)  Berenice Villeda MD as Consulting Physician (Pulmonology)  Rc Louis (Optometry)  VERONIKA Calderón CNP as Nurse Practitioner (Certified Nurse Practitioner)  Bonnie Wood MD as Consulting Physician (Internal Medicine Cardiovascular Disease)  Mayda Rayo MD as Consulting Physician (Internal Medicine Cardiovascular Disease)  Jazz Yepez MD as Consulting Physician (Pain Management)  Cooper Murphy DPM as Consulting Physician (Podiatry)  Natalia Couch MD as Consulting Physician (Nephrology)    2022    Marko Yao (:  1950) raman 66 y.o. female, here for evaluation of the following medical concerns:   Chief Complaint   Patient presents with    New Patient     Est.Care    Discuss Medications     zofran, ranexa    Medication Refill     flexeril     Marko Yao is a 65-year-old female here today to establish care. Previously under care of [facility] family medicine, Dr. Linda Denney. Patient has a known history of cad, CHF, Hypertension, ckd, Diabetes Mellitus, Hyperlipidemia, Chronic Obstructive Pulmonary Disease, oxygen dependent, low back pain, anxiety depression, obesity. Patient recently seen on  by pain management with plans to wean narcotic pain medication, documented that she must engage in physical therapy regimen. Marko Yao wishes to establish care, switching from [facility] primary care. Upset that her last PCP was not going to [address] [place] [address]. States she wishes to stop Zofran, feels it made her dizzy.     Flexeril foes help back pain    Recently switched to Lexapro for depression in May. She denies knowing about the change was was taking Celaxa with Lexapro up until a few weeks ago. GERD/ CHRONIC NAUSEA  Patient was on pantoprazole, denies relief. Wants to go back to "[employer] Patient was buying OTC. Will switch back and refer to [facility] for further evaluation. Patient aware this might be temporary. Patient also c/o chronic nausea. Took Zofran before with relief.      CAD/ CHF  Wandra Aase is a 66 y.o. female with a known history of CAD/ congestive heart failure. Current symptoms include: exertional chest pressure/discomfort and peripheral edema. She denies chest pain, dyspnea, irregular heart beat, and palpitations. The symptoms are aggravated by  exertion, and relieved by rest, . Patient's current treatment includes : Coreg, isordil, NTG, Plavix, Ranexa- new, . Patient is compliant all of the time with her therapy/medications. She states she is compliant with low salt diet. Patient is established with Dr. Jad Collins, Dr. Nirmal Watt. Started Flexeril, prednisone, Ranexa. Review of Systems    Prior to Visit Medications    Medication Sig Taking? Authorizing Provider   citalopram (CELEXA) 20 MG tablet  Yes Historical Provider, MD   escitalopram (LEXAPRO) 10 MG tablet Take 1 tablet by mouth daily Yes VERONIKA Kirk CNP   Continuous Blood Gluc Sensor (FREESTYLE SHIVA 14 DAY SENSOR) MISC 1 box by Does not apply route once a week for 14 days Yes VERONIKA Kirk CNP   Continuous Blood Gluc  (FREESTYLE SHIVA 14 DAY READER) HARMEET 1 Units by Does not apply route 4 times daily Yes VERONIKA Kirk CNP   Dulaglutide (TRULICITY) 1.5 VU/9.3TB SOPN Inject 1.5 mg into the skin once a week Yes VERONIKA Kirk CNP   oxyCODONE-acetaminophen (PERCOCET) 5-325 MG per tablet Take 1 tablet by mouth every 8 hours as needed for Pain for up to 7 days. Intended supply: 30 days.  Yes Mariel Hawleypper Vriezelaar, APRN - CNP   [employer]I PEN NEEDLES 31G X 6 MM MISC USE AS DIRECTED Yes VERONIKA Snowden CNP   FEROSUL 325 (65 Fe) MG tablet TAKE 1 TAB BY MOUTH IN THE MORNING Yes VERONIKA Snowden CNP   clopidogrel (PLAVIX) 75 MG tablet TAKE 1 TAB BY MOUTH ONCE A DAY ( IN THE MORNING ) -THIS MEDICINE MAY BE TAKENWITH OR WITHOUT FOOD Yes VERONIKA Snowden CNP   omeprazole (PRILOSEC) 40 MG delayed release capsule Take 1 capsule by mouth every morning (before breakfast) Yes VERONIKA Snowden CNP   furosemide (LASIX) 20 MG tablet TAKE 1 TABLET BY MOUTH ONCE DAILY AS NEEDED Yes VERONIKA Snowden CNP   ranolazine (RANEXA) 500 MG extended release tablet Take 1 tablet by mouth 2 times daily for 14 days Yes Travon Blanchard, DO   ONETOUCH ULTRA strip TEST TWO (2) TO THREE (3) TIMES A DAY & AS NEEDED FOR SYMPTOMS OF IRREGULAR BLOOD GLUCOSE Yes Cranford Riedel, MD   oxybutynin (DITROPAN-XL) 5 MG extended release tablet TAKE 1 TABLET BY MOUTH DAILY Yes Cranford Riedel, MD   fenofibrate (TRIGLIDE) 160 MG tablet TAKE 1 TABLET BY MOUTH DAILY Yes Cranford Riedel, MD   insulin aspart (NOVOLOG FLEXPEN) 100 UNIT/ML injection pen IF<139 NO INSULIN; 140-199-2 UN;200-249-4 UN;250-299-6 UN;300-349-8 UN;350-400=10 UN;ABOVE 400-12 UNIT(S) Yes Cranford Riedel, MD   magnesium oxide (MAG-OX) 400 MG tablet  Yes Nick Mcknight MD   insulin glargine (BASAGLAR KWIKPEN) 100 UNIT/ML injection pen Inject 55 Units into the skin 2 times daily Yes Cranford Riedel, MD   Continuous Blood Gluc Sensor (DEXCOM G6 SENSOR) MISC Use daily for checking blood sugars Yes Cranford Riedel, MD   losartan (COZAAR) 25 MG tablet TAKE 1 TABLET BY MOUTH ONCE DAILY Yes Cranford Riedel, MD   carvedilol (COREG) 3.125 MG tablet TAKE 1 TAB BY MOUTH TWICE A DAY ( IN THE MORNING AND BEDTIME ) Yes Cranford Riedel, MD   ketoconazole (NIZORAL) 2 % cream Apply topically  2 times a day.  Yes Cranford Riedel, MD   aspirin 81 MG EC tablet TAKE 1 TABLET BY MOUTH ONCE DAILY Yes Nicolasa Nelson MD   docusate sodium (COLACE) 100 MG capsule TAKE 1 CAPSULE BY MOUTH TWICE A DAY Yes Nicolasa Nelson MD   Multiple Vitamins-Minerals (CERTAVITE/ANTIOXIDANTS) TABS TAKE 1 TABLET BY MOUTH ONCE DAILY Yes Nicolasa Nelson MD   isosorbide dinitrate (ISORDIL) 30 MG tablet TAKE 1 TABLET BY MOUTH ONCE DAILY Yes Nicolasa Nelson MD   vitamin B-12 (CYANOCOBALAMIN) 100 MCG tablet take half a tablet BY MOUTH ONCE DAILY Yes Estela Garcia MD   Icosapent Ethyl (VASCEPA) 1 g CAPS capsule TAKE 1 CAPSULE BY MOUTH TWICE A DAY Yes Anaya Hill MD   dicyclomine (BENTYL) 10 MG capsule TAKE 1 CAPSULE BY MOUTH TWICE A DAY AS NEEDED FOR ABDOMINAL SPASMS Yes Estela Garcia MD   topiramate (TOPAMAX) 100 MG tablet TAKE 1 TABLET BY MOUTH NIGHTLY  Yes Merlin Adolph, MD   metFORMIN (GLUCOPHAGE) 1000 MG tablet TAKE 1 TAB BY MOUTH TWICE A DAY ( IN THE MORNING AND BEDTIME )  Yes Nicolasa Nelson MD   blood glucose test strips (ONETOUCH ULTRA) strip TEST TWO (2) TO THREE (3) TIMES A DAY & AS NEEDED FOR SYMPTOMS OF IRREGULAR BLOOD GLUCOSE Yes Nicolasa Nelson MD   Blood Glucose Monitoring Suppl (ONE TOUCH ULTRA 2) w/Device KIT  Yes Historical Provider, MD   atorvastatin (LIPITOR) 40 MG tablet Take 1 tablet by mouth daily Yes Nicolasa Nelson MD   BiPAP Machine MISC repair/replace Bipap maintain 18/9CMH2O, Cflex=3,mask,tubing,filters,headgear,heated humidifier,all supplies PRN Yes Historical Provider, MD   Psyllium (METAMUCIL PO) Take by mouth 3 times daily Takes powder Yes Historical Provider, MD   blood glucose test strips (TRUE METRIX BLOOD GLUCOSE TEST) strip THREE TIMES A DAY Yes Nicolasa Nelson MD   albuterol (PROVENTIL) (2.5 MG/3ML) 0.083% nebulizer solution Take 3 mLs by nebulization every 6 hours as needed for Wheezing Yes Kelly Crowell MD   gabapentin (NEURONTIN) 300 MG capsule Take 1 capsule by mouth 3 times daily for 30 days. Patient taking differently: Take 300 mg by mouth 2 times daily. Yes Scott Baker APRN - CNP   nystatin (MYCOSTATIN) 403698 UNIT/GM powder Apply 3 times daily. Yes Carly Finn MD   lidocaine (LMX) 4 % cream Apply topically every 8 hrs as needed for pain Yes Carly Finn MD   Lift Chair MISC by Does not apply route Yes Carly Finn MD   Misc. Devices (ADJUST BATH/SHOWER SEAT/BACK) MISC Use daily for shower Yes Carly Finn MD   Alcohol Swabs (B-D SINGLE USE SWABS REGULAR) PADS THREE TIMES A DAY Yes Carly Finn MD   nitroGLYCERIN (NITROSTAT) 0.4 MG SL tablet 1 under the tongue as needed for angina, may repeat q5mins for up three doses Yes Historical Provider, MD   Blood Glucose Monitoring Suppl HARMEET Use daily to check BS Yes Carly Finn MD   ipratropium-albuterol (DUONEB) 0.5-2.5 (3) MG/3ML SOLN nebulizer solution Inhale 3 mLs into the lungs every 4 hours Yes Pj Matt MD   Melatonin 10 MG TABS Take 10 mg by mouth nightly Yes Carly Finn MD   Compression Stockings MISC by Does not apply route Pressure between 20 - 25 . Yes Carly Finn MD   SYMBICORT 160-4.5 MCG/ACT AERO   2 puffs As needed for sob Yes Historical Provider, MD   OXYGEN Inhale 3 L into the lungs  Yes Historical Provider, MD   pantoprazole (PROTONIX) 40 MG tablet   Historical Provider, MD        Allergies   Allergen Reactions    Robitussin [Guaifenesin] Anaphylaxis    Codeine Swelling    Compazine [Prochlorperazine Maleate] Hives and Swelling    Iodides Itching    Morphine Other (See Comments)     hallucinations    Moxifloxacin      Other reaction(s): Intolerance-unknown  Other reaction(s):  Intolerance-unknown    Reglan [Metoclopramide] Nausea Only    Avelox [Moxifloxacin Hcl In Nacl] Rash     Dermatitis      Cipro Xr Rash     dermatitis    Sulfa Antibiotics Rash    Zofran [Ondansetron Hcl] Nausea And Vomiting       Past Medical History:   Diagnosis Date    Abdominal bloating 1/26/2021    Adenomatous polyp of ascending colon 1/26/2021    Allergic rhinitis     Anxiety 7/17/2013    Arthritis     Asthma     Atrial fibrillation ([facility]     Back pain     NERVE/DR. GRACIA    Benign hypertension with CKD (chronic kidney disease) stage III ([facility])     CAD (coronary artery disease) 3/21/2013    Caffeine use     2 coffee/day    CHF (congestive heart failure) ([facility])     Chronic kidney disease     CKD (chronic kidney disease) stage 3, GFR 30-59 ml/min ([facility])     COPD (chronic obstructive pulmonary disease) ([facility])     emphysema    Degeneration of lumbar or lumbosacral intervertebral disc     Depression     DM (diabetes mellitus) ([facility])     Emphysema of lung ([facility])     Gastritis     GERD (gastroesophageal reflux disease)     Hearing loss     Hematuria     Hiatal hernia     HTN (hypertension), benign 6/28/2014    Hypertriglyceridemia     Incontinence of urine 9/25/2013    Irritable bowel syndrome with both constipation and diarrhea 1/26/2021    Knee arthropathy     Lumbosacral spondylosis without myelopathy 9/29/2014    Migraine headache     DR. GRACIA    Mumps     Neuropathy     Obesity     On home oxygen therapy     2 L PER NC  HS AND PRN    HIGINIO on CPAP     Otitis media 1-26-15    Secondary diabetes mellitus with stage 3 chronic kidney disease (GFR 30-59) ([facility])     Stroke ([facility] [address]      mini stroke.     Tinnitus     Type 2 diabetes mellitus without complication ([facility])     Type II or unspecified type diabetes mellitus without mention of complication, not stated as uncontrolled     UTI (lower urinary tract infection)     Varicose vein        Past Surgical History:   Procedure Laterality Date    ABLATION OF DYSRHYTHMIC FOCUS  2000    CARPAL TUNNEL RELEASE Right     CATARACT REMOVAL WITH IMPLANT Bilateral     CHOLECYSTECTOMY  2007    COLONOSCOPY      COLONOSCOPY  04/10/2017    tubular adenomo polyp , mod. sigmoid diverticulosis, min. int. hemorrhoids    COLONOSCOPY N/A 3/2/2021    COLONOSCOPY WITH BIOPSY performed by Genny Horowitz MD at [place] [address] 2013    DILATION AND CURETTAGE  1979    EYE SURGERY      laser    INCONTINENCE SURGERY      Percutaneous nerve stimulation Dr Cruz Herrera 2013     INSERTABLE CARDIAC MONITOR  2015    [employer] REVEAL LINQ MODEL #[id number] MRI CONDTIONAL OK 3T. IMMEDIATELY POST IMPLANT    OTHER SURGICAL HISTORY  09/10/15    removal of interstim    MN COLSC FLX W/REMOVAL LESION BY HOT BX FORCEPS N/A 4/10/2017    COLONOSCOPY POLYPECTOMY HOT BIOPSY performed by Toro Newell MD at [address] TYMPANOPLASTY      TYMPANOPLASTY      TYMPANOSTOMY TUBE PLACEMENT  2017    UPPER GASTROINTESTINAL ENDOSCOPY  2016    Dr Jeffry Mendoza N/A 3/2/2021    EGD BIOPSY performed by Adrienne Fernandez MD at [address] [place] [address] History     Socioeconomic History    Marital status: Legally      Spouse name: Not on file    Number of children: Not on file    Years of education: Not on file    Highest education level: Not on file   Occupational History    Occupation: disabled     Employer: N/A   Tobacco Use    Smoking status: Former Smoker     Packs/day: 3.00     Years: 41.00     Pack years: 123.00     Types: Cigarettes     Quit date: 2000     Years since quittin.5    Smokeless tobacco: Never Used    Tobacco comment: quit in    Vaping Use    Vaping Use: Never used   Substance and Sexual Activity    Alcohol use: No     Alcohol/week: 0.0 standard drinks    Drug use: No    Sexual activity: Not Currently   Other Topics Concern    Not on file   Social History Narrative    Not on file     Social Determinants of Health     Financial Resource Strain: Low Risk     Difficulty of Paying Living Expenses: Not hard at all   Food Insecurity: No Food Insecurity    Worried About [address] in the Last Year: Never true    [facility] in the Last Year: Never true   Transportation Needs:     Lack of Transportation (Medical):  Not on file  Lack of Transportation (Non-Medical): Not on file   Physical Activity:     Days of Exercise per Week: Not on file    Minutes of Exercise per Session: Not on file   Stress:     Feeling of Stress : Not on file   Social Connections:     Frequency of Communication with Friends and Family: Not on file    Frequency of Social Gatherings with Friends and Family: Not on file    Attends Restorationist Services: Not on file    Active Member of [address] [place] [address] [email or web address] or Organizations: Not on file    Attends Club or Organization Meetings: Not on file    Marital Status: Not on file   Intimate Partner Violence:     Fear of Current or Ex-Partner: Not on file    Emotionally Abused: Not on file    Physically Abused: Not on file    Sexually Abused: Not on file   Housing Stability:     Unable to Pay for Housing in the Last Year: Not on file    Number of [place] in the Last Year: Not on file    Unstable Housing in the Last Year: Not on file        Family History   Problem Relation Age of Onset    Diabetes Mother     Heart Disease Mother     Stomach Cancer Father     Diabetes Maternal Grandmother         also aunts and uncles (maternal)    Emphysema Sister        Vitals:    07/08/22 0857   BP: (!) 111/58   Site: Right Upper Arm   Position: Sitting   Pulse: 82   Temp: 97.3 °F (36.3 °C)   TempSrc: Infrared   SpO2: 93%   Weight: 249 lb (112.9 kg)     Estimated body mass index is 45.54 kg/m² as calculated from the following:    Height as of 6/2/22: 5' 2\" (1.575 m). Weight as of this encounter: 249 lb (112.9 kg). Physical Exam  Vitals and nursing note reviewed. Constitutional:       Appearance: She is obese. HENT:      Head: Normocephalic and atraumatic. Nose: Nose normal.      Mouth/Throat:      Mouth: Mucous membranes are moist.   Eyes:      Extraocular Movements: Extraocular movements intact. Pupils: Pupils are equal, round, and reactive to light.    Cardiovascular:      Rate and Rhythm: Normal rate and regular rhythm. Heart sounds: Normal heart sounds. Pulmonary:      Effort: Pulmonary effort is normal. No respiratory distress. Breath sounds: Decreased air movement present. No stridor. Decreased breath sounds present. No wheezing or rhonchi. Abdominal:      General: Bowel sounds are normal. There is no distension. Palpations: Abdomen is soft. There is no mass. Tenderness: There is no abdominal tenderness. Hernia: No hernia is present. Musculoskeletal:      Cervical back: No deformity or tenderness. Decreased range of motion. Thoracic back: Deformity and spasms present. Normal range of motion. Lumbar back: Deformity, spasms and tenderness present. Decreased range of motion. Positive right straight leg raise test. Negative left straight leg raise test.      Right lower le+ Edema present. Left lower le+ Edema present. Lymphadenopathy:      Cervical: No cervical adenopathy. Skin:     General: Skin is warm. Neurological:      Mental Status: She is alert and oriented to person, place, and time. Cranial Nerves: No cranial nerve deficit. Motor: No weakness. Gait: Gait is intact. Gait normal.      Comments: Using motorized wheelchair   Psychiatric:         Attention and Perception: Attention and perception normal.         Mood and Affect: Mood is anxious. Mood is not depressed. Speech: She is communicative. Behavior: Behavior normal. Behavior is not agitated or slowed. ASSESSMENT     Diagnosis Orders   1. Type 2 diabetes mellitus with diabetic polyneuropathy, with long-term current use of insulin ([facility])  POCT glycosylated hemoglobin (Hb A1C)    Continuous Blood Gluc Sensor (FREESTYLE SHIVA 14 DAY SENSOR) MISC    Continuous Blood Gluc  (FREESTYLE SHIVA 14 DAY READER) HARMEET    Dulaglutide (TRULICITY) 1.5 COCHRAN/1.4OF [address] [facility] Diabetes Education - [facility]   2. Morbid obesity with BMI of 40.0-44.9, adult ([address]     3.  Chronic diastolic congestive heart failure ([facility] [address]     4. Chronic bilateral low back pain with bilateral sciatica     5. Chronic obstructive pulmonary disease with acute exacerbation ([facility] [address]     6. Anxiety and depression  escitalopram (LEXAPRO) 10 MG tablet          PLAN:  Orders Placed This Encounter   Medications    escitalopram (LEXAPRO) 10 MG tablet     Sig: Take 1 tablet by mouth daily     Dispense:  90 tablet     Refill:  1    Continuous Blood Gluc Sensor (FREESTYLE KHADRA 14 DAY SENSOR) MISC     Si box by Does not apply route once a week for 14 days     Dispense:  6 each     Refill:  5    Continuous Blood Gluc  (FREESTYLE KHADRA 14 DAY READER) HARMEET     Si Units by Does not apply route 4 times daily     Dispense:  1 each     Refill:  2    Dulaglutide (TRULICITY) 1.5 KJ/5.4JW SOPN     Sig: Inject 1.5 mg into the skin once a week     Dispense:  4 pen     Refill:  1     Stop 0.75 mg dose         1. Patient presents the office today to establish care at [address] primary care clinic. Unable to recount current medications  2. Does believe she sees Dr. Feliciano Caldwell group in the upcoming month for CHF management. Encouraged to discuss Ranexa dosing with him. 3. Diabetes remains uncontrolled, patient states she was recently started on Trulicity. Discussed that this medication is known to cause nausea as it slows digestion. Heavily discussed the importance of eating small meals, roughly 5-6 small meals a day. Patient lacks knowledge on diabetic eating habits and carbohydrates. Diabetic education referral placed today. Minimal improvement in A1c, patient is interested in increasing dosing of Trulicity. 4. Asking for CGM device, order placed for khadra    FOLLOW UP AND INSTRUCTIONS:  Return in about 3 months (around 10/8/2022) for Diabetes, copd.     · Mariangel received counseling on the following healthy behaviors:nutrition and medication adherence    · Discussed use, benefit, and side effects of prescribed medications. Barriers to  medication compliance addressed. All patient questions answered. Pt voiced  understanding. · Patient given educational materials - see patient instructions    · Patient advised to contact scheduling offices for any referrals or imaging orders  placed today if they have not been contacted in 48 hours. Return in about 3 months (around 10/8/2022) for Diabetes, copd. An  electronic signature was used to authenticate this note. --VERONIKA Gordon CNP on 7/8/2022 at 10:02 AM  Visit Information    Have you changed or started any medications since your last visit including any over-the-counter medicines, vitamins, or herbal medicines? no   Are you having any side effects from any of your medications? -  yes -   Have you stopped taking any of your medications? Is so, why? -  yes -     Have you seen any other physician or provider since your last visit? Yes - Records Obtained  Have you had any other diagnostic tests since your last visit? No  Have you been seen in the emergency room and/or had an admission to a hospital since we last saw you? No  Have you had your routine dental cleaning in the past 6 months? no    Have you activated your [employer] account? If not, what are your barriers?  Yes     Patient Care Team:  VERONIKA Gordon CNP as PCP - General (Family Medicine)  VERONIKA Fang CNP as PCP - [facility] Empaneled Provider  Morelia Cooper MD as Consulting Physician (Urology)  Ofelia Hogan MD as Consulting Physician (Pulmonology)  Shyam Agudelo (Optometry)  VERONIKA Dubois CNP as Nurse Practitioner (Certified Nurse Practitioner)  Bita Harris MD as Consulting Physician (Internal Medicine Cardiovascular Disease)  Silvio Jama MD as Consulting Physician (Internal Medicine Cardiovascular Disease)  Sidney Flannery MD as Consulting Physician (Pain Management)  Gerardo Hutchison DPM as Consulting Physician (Podiatry)  Noe Liu Liz Hinton MD as Consulting Physician (Nephrology)    Medical History Review  Past Medical, Family, and Social History reviewed and does not contribute to the patient presenting condition    Health Maintenance   Topic Date Due    Annual Wellness Visit (AWV)  Never done    DTaP/Tdap/Td vaccine (1 - Tdap) Never done    Diabetic retinal exam  10/29/2021    Flu vaccine (1) 09/01/2022    A1C test (Diabetic or Prediabetic)  10/08/2022    Breast cancer screen  11/17/2022    Lipids  03/09/2023    Diabetic foot exam  05/06/2023    Depression Monitoring  05/10/2023    Colorectal Cancer Screen  03/02/2024    DEXA (modify frequency per FRAX score)  Completed    Pneumococcal 65+ years Vaccine  Completed    COVID-19 Vaccine  Completed    Hepatitis C screen  Completed    Hepatitis A vaccine  Aged Out    Hib vaccine  Aged Out    Meningococcal (ACWY) vaccine  Aged Out

## 2022-07-12 ENCOUNTER — HOSPITAL ENCOUNTER (OUTPATIENT)
Dept: PAIN MANAGEMENT | Age: 72
Discharge: HOME | End: 2022-07-12
Payer: COMMERCIAL

## 2022-07-12 VITALS
OXYGEN SATURATION: 91 % | DIASTOLIC BLOOD PRESSURE: 73 MMHG | SYSTOLIC BLOOD PRESSURE: 143 MMHG | HEART RATE: 78 BPM | TEMPERATURE: 97.7 F

## 2022-07-12 DIAGNOSIS — M51.36 DDD (DEGENERATIVE DISC DISEASE), LUMBAR: Primary | ICD-10-CM

## 2022-07-12 DIAGNOSIS — M47.817 LUMBOSACRAL SPONDYLOSIS WITHOUT MYELOPATHY: Chronic | ICD-10-CM

## 2022-07-12 DIAGNOSIS — M54.16 LUMBAR RADICULOPATHY, CHRONIC: ICD-10-CM

## 2022-07-12 DIAGNOSIS — G62.9 POLYNEUROPATHY, UNSPECIFIED: ICD-10-CM

## 2022-07-12 DIAGNOSIS — M50.30 DDD (DEGENERATIVE DISC DISEASE), CERVICAL: Chronic | ICD-10-CM

## 2022-07-12 DIAGNOSIS — Z51.81 MEDICATION MONITORING ENCOUNTER: Chronic | ICD-10-CM

## 2022-07-12 DIAGNOSIS — M46.1 SACROILIITIS, NOT ELSEWHERE CLASSIFIED (HCC): Chronic | ICD-10-CM

## 2022-07-12 PROCEDURE — 99213 OFFICE O/P EST LOW 20 MIN: CPT | Performed by: NURSE PRACTITIONER

## 2022-07-12 PROCEDURE — 99213 OFFICE O/P EST LOW 20 MIN: CPT

## 2022-07-12 RX ORDER — OXYCODONE HYDROCHLORIDE AND ACETAMINOPHEN 5; 325 MG/1; MG/1
1 TABLET ORAL EVERY 8 HOURS PRN
Qty: 90 TABLET | Refills: 0 | Status: SHIPPED | OUTPATIENT
Start: 2022-07-13 | End: 2022-08-10 | Stop reason: SDUPTHER

## 2022-07-12 ASSESSMENT — PAIN SCALES - GENERAL: PAINLEVEL_OUTOF10: 7

## 2022-07-12 ASSESSMENT — ENCOUNTER SYMPTOMS
BOWEL INCONTINENCE: 0
CONSTIPATION: 0
COUGH: 0
BACK PAIN: 1
SHORTNESS OF BREATH: 0

## 2022-07-12 NOTE — PROGRESS NOTES
Chief Complaint   Patient presents with    Back Pain    Medication Refill       [facility] Pt c/o low back pain that radiates down legs. MRI done in 10- with multilevel degenerative changes and Right paracentral disc extrusion L4-5 narrowing the right lateral recess. She has never had a lumbar surgery and not interested in seeing NS for opinion. She is dependant on her scooter to get around - can walk very short distances. Working on weight loss and reports recent 20lb loss over last 2 months. Pt has had injections but refuses to repeat d/t exreme pain during injections.     She saw Dr. Basilio Ma last month and he referred her to PT - she has had consultation and will start sessions this week. Back Pain  This is a chronic problem. The current episode started more than 1 year ago. The problem occurs intermittently. The problem is unchanged. The pain is present in the lumbar spine. The quality of the pain is described as stabbing. The pain radiates to the right thigh, right knee, right foot, left thigh, left knee and left foot. The pain is at a severity of 7/10. The pain is moderate. The pain is worse during the night. The symptoms are aggravated by sitting and position (walking). Stiffness is present at night. Associated symptoms include headaches. Pertinent negatives include no bladder incontinence, bowel incontinence, chest pain, fever, numbness, tingling or weakness. She has tried analgesics and heat for the symptoms. The treatment provided mild relief. Patient denies any new neurological symptoms. No bowel or bladder incontinence, no weakness, and no falling.     Pill count: appropriate Oxycodone #0 due tomorrow    Morphine equivalent: 22.5    Controlled Substance Monitoring:    Acute and Chronic Pain Monitoring:   RX Monitoring 7/12/2022   Attestation -   Acute Pain Prescriptions -   Periodic Controlled Substance Monitoring Possible medication side effects, risk of tolerance/dependence & alternative treatments discussed. ;No signs of potential drug abuse or diversion identified.;Obtaining appropriate analgesic effect of treatment. Chronic Pain > 50 MEDD -   Chronic Pain > 80 MEDD -         Past Medical History:   Diagnosis Date    Abdominal bloating 1/26/2021    Adenomatous polyp of ascending colon 1/26/2021    Allergic rhinitis     Anxiety 7/17/2013    Arthritis     Asthma     Atrial fibrillation ([facility])     Back pain     NERVE/DR. GRACIA    Benign hypertension with CKD (chronic kidney disease) stage III ([facility])     CAD (coronary artery disease) 3/21/2013    Caffeine use     2 coffee/day    CHF (congestive heart failure) ([facility])     Chronic kidney disease     CKD (chronic kidney disease) stage 3, GFR 30-59 ml/min ([facility])     COPD (chronic obstructive pulmonary disease) ([facility])     emphysema    Degeneration of lumbar or lumbosacral intervertebral disc     Depression     DM (diabetes mellitus) ([facility])     Emphysema of lung ([facility])     Gastritis     GERD (gastroesophageal reflux disease)     Hearing loss     Hematuria     Hiatal hernia     HTN (hypertension), benign 6/28/2014    Hypertriglyceridemia     Incontinence of urine 9/25/2013    Irritable bowel syndrome with both constipation and diarrhea 1/26/2021    Knee arthropathy     Lumbosacral spondylosis without myelopathy 9/29/2014    Migraine headache     DR. GRACIA    Mumps     Neuropathy     Obesity     On home oxygen therapy     2 L PER NC  HS AND PRN    HIGINIO on CPAP     Otitis media 1-26-15    Secondary diabetes mellitus with stage 3 chronic kidney disease (GFR 30-59) ([facility])     Stroke ([facility] [address]      mini stroke.     Tinnitus     Type 2 diabetes mellitus without complication ([facility])     Type II or unspecified type diabetes mellitus without mention of complication, not stated as uncontrolled     UTI (lower urinary tract infection)     Varicose vein        Past Surgical History:   Procedure Laterality Date    ABLATION OF DYSRHYTHMIC FOCUS  2000    CARPAL TUNNEL RELEASE Right     CATARACT REMOVAL WITH IMPLANT Bilateral     CHOLECYSTECTOMY  2007    COLONOSCOPY      COLONOSCOPY  04/10/2017    tubular adenomo polyp , mod. sigmoid diverticulosis, min. int. hemorrhoids    COLONOSCOPY N/A 3/2/2021    COLONOSCOPY WITH BIOPSY performed by Liz Dang MD at [place] [address]  9/25/2013    DILATION AND CURETTAGE  1979    EYE SURGERY      laser    INCONTINENCE SURGERY      Percutaneous nerve stimulation Dr Suzanne Krishnan Nov 2013     INSERTABLE CARDIAC MONITOR  12/04/2015    [employer] REVEAL LINQ MODEL #[id number] MRI CONDTIONAL OK 3T. IMMEDIATELY POST IMPLANT    OTHER SURGICAL HISTORY  09/10/15    removal of interstim    IA COLSC FLX W/REMOVAL LESION BY HOT BX FORCEPS N/A 4/10/2017    COLONOSCOPY POLYPECTOMY HOT BIOPSY performed by Lopez Blackburn MD at [address] [place] [address]      [facility]      TYMPANOPLASTY      TYMPANOSTOMY TUBE PLACEMENT  08/21/2017    UPPER GASTROINTESTINAL ENDOSCOPY  03/2016    Dr Enedelia Abbasi N/A 3/2/2021    EGD BIOPSY performed by Liz Dang MD at [facility]       Allergies   Allergen Reactions    Robitussin [Guaifenesin] Anaphylaxis    Codeine Swelling    Compazine [Prochlorperazine Maleate] Hives and Swelling    Iodides Itching    Morphine Other (See Comments)     hallucinations    Moxifloxacin      Other reaction(s): Intolerance-unknown  Other reaction(s):  Intolerance-unknown    Reglan [Metoclopramide] Nausea Only    Avelox [Moxifloxacin Hcl In Nacl] Rash     Dermatitis      Cipro Xr Rash     dermatitis    Sulfa Antibiotics Rash    Zofran [Ondansetron Hcl] Nausea And Vomiting         Current Outpatient Medications:     citalopram (CELEXA) 20 MG tablet, , Disp: , Rfl:     pantoprazole (PROTONIX) 40 MG tablet, , Disp: , Rfl:     escitalopram (LEXAPRO) 10 MG tablet, Take 1 tablet by mouth daily, Disp: 90 tablet, Rfl: 1    Continuous Blood Gluc Sensor (FREESTYLE SHIVA 14 DAY SENSOR) MISC, 1 box by Does not apply route once a week for 14 days, Disp: 6 each, Rfl: 5    Continuous Blood Gluc  (FREESTYLE SHIVA 14 DAY READER) HARMEET, 1 Units by Does not apply route 4 times daily, Disp: 1 each, Rfl: 2    Dulaglutide (TRULICITY) 1.5 FA/4.1UG SOPN, Inject 1.5 mg into the skin once a week, Disp: 4 pen, Rfl: 1    oxyCODONE-acetaminophen (PERCOCET) 5-325 MG per tablet, Take 1 tablet by mouth every 8 hours as needed for Pain for up to 7 days. Intended supply: 30 days. , Disp: 21 tablet, Rfl: 0    ULTICARE MINI PEN NEEDLES 31G X 6 MM MISC, USE AS DIRECTED, Disp: 100 each, Rfl: 5    FEROSUL 325 (65 Fe) MG tablet, TAKE 1 TAB BY MOUTH IN THE MORNING, Disp: 90 tablet, Rfl: 2    clopidogrel (PLAVIX) 75 MG tablet, TAKE 1 TAB BY MOUTH ONCE A DAY ( IN THE MORNING ) -THIS MEDICINE MAY BE TAKENWITH OR WITHOUT FOOD, Disp: 90 tablet, Rfl: 1    omeprazole (PRILOSEC) 40 MG delayed release capsule, Take 1 capsule by mouth every morning (before breakfast), Disp: 30 capsule, Rfl: 1    furosemide (LASIX) 20 MG tablet, TAKE 1 TABLET BY MOUTH ONCE DAILY AS NEEDED, Disp: 30 tablet, Rfl: 0    ranolazine (RANEXA) 500 MG extended release tablet, Take 1 tablet by mouth 2 times daily for 14 days, Disp: 28 tablet, Rfl: 0    ONETOUCH ULTRA strip, TEST TWO (2) TO THREE (3) TIMES A DAY & AS NEEDED FOR SYMPTOMS OF IRREGULAR BLOOD GLUCOSE, Disp: 100 strip, Rfl: 0    oxybutynin (DITROPAN-XL) 5 MG extended release tablet, TAKE 1 TABLET BY MOUTH DAILY, Disp: 30 tablet, Rfl: 3    fenofibrate (TRIGLIDE) 160 MG tablet, TAKE 1 TABLET BY MOUTH DAILY, Disp: 90 tablet, Rfl: 1    insulin aspart (NOVOLOG FLEXPEN) 100 UNIT/ML injection pen, IF<139 NO INSULIN; 140-199-2 UN;200-249-4 UN;250-299-6 UN;300-349-8 UN;350-400=10 UN;ABOVE 400-12 UNIT(S), Disp: 10 mL, Rfl: 3    magnesium oxide (MAG-OX) 400 MG tablet, , Disp: , Rfl:     insulin glargine (BASAGLAR KWIKPEN) 100 UNIT/ML injection pen, Inject 55 Units into the skin 2 times daily, Disp: 10 mL, Rfl: 2    Continuous Blood Gluc Sensor (DEXCOM G6 SENSOR) MISC, Use daily for checking blood sugars, Disp: 1 each, Rfl: 2    losartan (COZAAR) 25 MG tablet, TAKE 1 TABLET BY MOUTH ONCE DAILY, Disp: 30 tablet, Rfl: 9    carvedilol (COREG) 3.125 MG tablet, TAKE 1 TAB BY MOUTH TWICE A DAY ( IN THE MORNING AND BEDTIME ), Disp: 180 tablet, Rfl: 3    ketoconazole (NIZORAL) 2 % cream, Apply topically  2 times a day., Disp: 1 each, Rfl: 0    aspirin 81 MG EC tablet, TAKE 1 TABLET BY MOUTH ONCE DAILY, Disp: 90 tablet, Rfl: 1    docusate sodium (COLACE) 100 MG capsule, TAKE 1 CAPSULE BY MOUTH TWICE A DAY, Disp: 60 capsule, Rfl: 5    Multiple Vitamins-Minerals (CERTAVITE/ANTIOXIDANTS) TABS, TAKE 1 TABLET BY MOUTH ONCE DAILY, Disp: 30 tablet, Rfl: 5    isosorbide dinitrate (ISORDIL) 30 MG tablet, TAKE 1 TABLET BY MOUTH ONCE DAILY, Disp: 30 tablet, Rfl: 5    vitamin B-12 (CYANOCOBALAMIN) 100 MCG tablet, take half a tablet BY MOUTH ONCE DAILY, Disp: 30 tablet, Rfl: 0    Icosapent Ethyl (VASCEPA) 1 g CAPS capsule, TAKE 1 CAPSULE BY MOUTH TWICE A DAY, Disp: 60 capsule, Rfl: 0    dicyclomine (BENTYL) 10 MG capsule, TAKE 1 CAPSULE BY MOUTH TWICE A DAY AS NEEDED FOR ABDOMINAL SPASMS, Disp: 60 capsule, Rfl: 0    topiramate (TOPAMAX) 100 MG tablet, TAKE 1 TABLET BY MOUTH NIGHTLY , Disp: 90 tablet, Rfl: 2    metFORMIN (GLUCOPHAGE) 1000 MG tablet, TAKE 1 TAB BY MOUTH TWICE A DAY ( IN THE MORNING AND BEDTIME ) , Disp: 180 tablet, Rfl: 3    blood glucose test strips (ONETOUCH ULTRA) strip, TEST TWO (2) TO THREE (3) TIMES A DAY & AS NEEDED FOR SYMPTOMS OF IRREGULAR BLOOD GLUCOSE, Disp: 100 strip, Rfl: 0    Blood Glucose Monitoring Suppl (ONE TOUCH ULTRA 2) w/Device KIT, , Disp: , Rfl:     atorvastatin (LIPITOR) 40 MG tablet, Take 1 tablet by mouth daily, Disp: 90 tablet, Rfl: 3    BiPAP Machine MISC, repair/replace Bipap maintain 18/9CMH2O, Cflex=3,mask,tubing,filters,headgear,heated humidifier,all supplies PRN, Disp: , Rfl:     Psyllium (METAMUCIL PO), Take by mouth 3 times daily Takes powder, Disp: , Rfl:     blood glucose test strips (TRUE METRIX BLOOD GLUCOSE TEST) strip, THREE TIMES A DAY, Disp: 100 each, Rfl: 3    albuterol (PROVENTIL) (2.5 MG/3ML) 0.083% nebulizer solution, Take 3 mLs by nebulization every 6 hours as needed for Wheezing, Disp: 120 each, Rfl: 3    gabapentin (NEURONTIN) 300 MG capsule, Take 1 capsule by mouth 3 times daily for 30 days. (Patient taking differently: Take 300 mg by mouth 2 times daily. ), Disp: 90 capsule, Rfl: 2    nystatin (MYCOSTATIN) 090808 UNIT/GM powder, Apply 3 times daily. , Disp: 3 Bottle, Rfl: 3    lidocaine (LMX) 4 % cream, Apply topically every 8 hrs as needed for pain, Disp: 45 g, Rfl: 3    Lift Chair MISC, by Does not apply route, Disp: 1 each, Rfl: 0    Misc.  Devices (ADJUST BATH/SHOWER SEAT/BACK) MISC, Use daily for shower, Disp: 1 each, Rfl: 0    Alcohol Swabs (B-D SINGLE USE SWABS REGULAR) PADS, THREE TIMES A DAY, Disp: 100 each, Rfl: 0    nitroGLYCERIN (NITROSTAT) 0.4 MG SL tablet, 1 under the tongue as needed for angina, may repeat q5mins for up three doses, Disp: , Rfl:     Blood Glucose Monitoring Suppl HARMEET, Use daily to check BS, Disp: 1 Device, Rfl: 0    ipratropium-albuterol (DUONEB) 0.5-2.5 (3) MG/3ML SOLN nebulizer solution, Inhale 3 mLs into the lungs every 4 hours, Disp: 360 mL, Rfl: 2    Melatonin 10 MG TABS, Take 10 mg by mouth nightly, Disp: 90 tablet, Rfl: 3    Compression Stockings MISC, by Does not apply route Pressure between 20 - 25 ., Disp: 1 each, Rfl: 0    SYMBICORT 160-4.5 MCG/ACT AERO,  2 puffs As needed for sob, Disp: , Rfl:     OXYGEN, Inhale 3 L into the lungs , Disp: , Rfl:     Family History   Problem Relation Age of Onset    Diabetes Mother     Heart Disease Mother     Stomach Cancer Father     Diabetes Maternal Grandmother         also aunts and uncles (maternal)    Emphysema Sister        Social History     Socioeconomic History    Marital status: Legally      Spouse name: Not on file    Number of children: Not on file    Years of education: Not on file    Highest education level: Not on file   Occupational History    Occupation: disabled     Employer: N/A   Tobacco Use    Smoking status: Former Smoker     Packs/day: 3.00     Years: 41.00     Pack years: 123.00     Types: Cigarettes     Quit date: 2000     Years since quittin.5    Smokeless tobacco: Never Used    Tobacco comment: quit in    Vaping Use    Vaping Use: Never used   Substance and Sexual Activity    Alcohol use: No     Alcohol/week: 0.0 standard drinks    Drug use: No    Sexual activity: Not Currently   Other Topics Concern    Not on file   Social History Narrative    Not on file     Social Determinants of Health     Financial Resource Strain: Low Risk     Difficulty of Paying Living Expenses: Not hard at all   Food Insecurity: No Food Insecurity    Worried About [address] in the Last Year: Never true    [address] in the Last Year: Never true   Transportation Needs:     Lack of Transportation (Medical): Not on file    Lack of Transportation (Non-Medical):  Not on file   Physical Activity:     Days of Exercise per Week: Not on file    Minutes of Exercise per Session: Not on file   Stress:     Feeling of Stress : Not on file   Social Connections:     Frequency of Communication with Friends and Family: Not on file    Frequency of Social Gatherings with Friends and Family: Not on file    Attends Anglican Services: Not on file    Active Member of Clubs or Organizations: Not on file    Attends Club or Organization Meetings: Not on file    Marital Status: Not on file   Intimate Partner Violence:     Fear of Current or Ex-Partner: Not on file    Emotionally Abused: Not on file    Physically Abused: Not on file    Sexually Abused: Not on file   Housing Stability:     Unable to Pay for Housing in the Last Year: Not on file    Number of Places Lived in the Last Year: Not on file    Unstable Housing in the Last Year: Not on file       Review of Systems:  Review of Systems   Constitutional: Negative for chills and fever. Cardiovascular: Negative for chest pain and palpitations. Respiratory: Negative for cough and shortness of breath. Musculoskeletal: Positive for back pain. Gastrointestinal: Negative for bowel incontinence and constipation. Genitourinary: Negative for bladder incontinence. Neurological: Positive for headaches. Negative for disturbances in coordination, loss of balance, numbness, tingling and weakness. Physical Exam:  BP (!) 143/73   Pulse 78   Temp 97.7 °F (36.5 °C) (Temporal)   LMP  (LMP Unknown)   SpO2 91%     Physical Exam  HENT:      Head: Normocephalic. Pulmonary:      Effort: Pulmonary effort is normal.   Musculoskeletal:         General: Normal range of motion. Cervical back: Normal range of motion. Lumbar back: Tenderness present. Skin:     General: Skin is warm and dry. Neurological:      Mental Status: She is alert and oriented to person, place, and time.          Record/Diagnostics Review:    Last greg 2/2022 and was appropriate     Assessment:  Problem List Items Addressed This Visit     DDD (degenerative disc disease), cervical (Chronic)    Relevant Medications    oxyCODONE-acetaminophen (PERCOCET) 5-325 MG per tablet (Start on 7/13/2022)    Medication monitoring encounter (Chronic)    Lumbosacral spondylosis without myelopathy (Chronic)    Relevant Medications    oxyCODONE-acetaminophen (PERCOCET) 5-325 MG per tablet (Start on 7/13/2022)    Sacroiliitis, not elsewhere classified ([address] (Chronic)    Relevant Medications    oxyCODONE-acetaminophen (PERCOCET) 5-325 MG per tablet (Start on 7/13/2022)    DDD (degenerative disc disease), lumbar - Primary    Relevant Medications oxyCODONE-acetaminophen (PERCOCET) 5-325 MG per tablet (Start on 7/13/2022)    Lumbar radiculopathy, chronic    Relevant Medications    oxyCODONE-acetaminophen (PERCOCET) 5-325 MG per tablet (Start on 7/13/2022)    Polyneuropathy, unspecified             Treatment Plan:  Patient relates current medications are helping the pain. Patient reports taking pain medications as prescribed, denies obtaining medications from different sources and denies use of illegal drugs. Patient denies side effects from medications like nausea, vomiting, constipation or drowsiness. Patient reports current activities of daily living are possible due to medications and would like to continue them. As always, we encourage daily stretching and strengthening exercises, and recommend minimizing use of pain medications unless patient cannot get through daily activities due to pain. Contract requirements met. Continue opioid therapy. Script written for percocet  Pt will start PT this week  Follow up appointment made for 4 weeks    I have reviewed the chief complaint and history of present illness (including ROS and PFSH) and vital documentation by my staff and I agree with their documentation and have added where applicable.

## 2022-07-13 ENCOUNTER — HOSPITAL ENCOUNTER (OUTPATIENT)
Dept: PHYSICAL THERAPY | Age: 72
Setting detail: THERAPIES SERIES
Discharge: HOME | End: 2022-07-13
Payer: COMMERCIAL

## 2022-07-13 PROCEDURE — 97110 THERAPEUTIC EXERCISES: CPT

## 2022-07-13 NOTE — FLOWSHEET NOTE
[address] [facility] Outpatient Physical Therapy   [address]  [facility] [address]   Phone: [phone]   Fax: [phone]    Physical Therapy Daily Treatment Note      Date:  2022  Patient Name:  Na Garcia    :  1950  MRN: [id number]  Physician: Lilo Mejía MD                        Insurance: [employer]. Auth:  Eval + 12 visits through 22  Medical Diagnosis:   M54.42, M54.41, G89.29 (ICD-10-CM) - Chronic bilateral low back pain with bilateral sciatica   E66.01, Z68.41 (ICD-10-CM) - Morbid obesity with BMI of 40.0-44.9, adult ([facility]   M47.817 (ICD-10-CM) - Lumbosacral spondylosis without myelopathy      Rehab Codes: M54.42, M54.41, R53.1, R26.9  Onset Date:                                Next 's appt: 22  Visit# / total visits: 2/12  Cancels/No Shows: 0/0    Precautions: Fall risk, requires SBA for safety for all standing tasks. Monitor SpO2 with exertion (pt has COPD and emphysema) and try to maintain at 90% or above through all activities. Subjective:  Patient states she has pain constantly but it has been tolerable today.        Pain:  [x] Yes  [] No Location: low back Pain Rating: (0-10 scale) 1/10  Pain altered Tx:  [] No  [] Yes  Action:  Comments:    Objective:  INTERVENTIONS  INTERVENTIONS  Reps/ Time Weight/ Level Completed  Today Comments          SpO2/HR: SpO2: HR:     Beginnin% 82bpm     Durin-93% 83-101bpm     End: 92% 80bpm            EXERCISES        Seated:       STS 5x2  x 1st set BUE support, 2nd set 1 UE support          Standing:       EO NBOS  30''  x    EC NBOS 30''  x    NBOS with head turns 2x30''  x    Modified Tandem stance 30''x2  x    Balloon taps 2'  x CGA, other staff member tapping balloon   Cone retrieval with 2WW 100ft x 6cones  x CGA, challenging change of COG   Mini squats 10x  x No UE support   Marching on foam 30x  x 1UE support, CGA          Other: **Patient is talkative**    Specific Instructions for next treatment Emphasis on functional, progressive B LE strengthening and endurance as appropriate. Incorporate both static and dynamic balance interventions as appropriate. Monitor exertion response and SpO2, adjusting intensity as needed. Assessment: [x] Progressing toward goals. 7/13: Began session with static balance activities and monitored standing endurance and SpO2 prior to moving onto more challenging tasks. Patient had difficulty with the NBOS with head turns activity due to dizziness. Cues given for slow movements to decrease onset of dizziness. Continued progressing to dynamic balance activities with good tolerance. Patient had no instances of LOB throughout entire session. Discussed increasing challenges at next visit. Monitored and documented SpO2 throughout session and rest breaks given in order to improve O2 sat. Pt agreed to bring portable O2 at next session. [] No change. [] Other:    [x] Patient would continue to benefit from skilled physical therapy services in order to: help modulate pain and spasms in B legs, improve B LE strength and functional endurance necessary to maximize independence and safety through all ADL and community activities. GOALS:   STG: (to be met in 6 treatments)  1. Pt will self report worst pain no greater than 3/10 in order to better tolerate ADLs/work activities with minimal dysfunction  2. Pt will complete FTSTS in <20 seconds with minimal UE support or less maintaining SpO2 at 90% or greater to show improved efficiency with functional transitions in home and reduced fall risk  LTG: (to be met in 12 treatments)  1. Pt will demonstrate improved B LE strength to 4/5 or greater in order to demonstrate improved stability/strength necessary for unrestricted ADLs/work activities  2. Pt will complete TUG in <20 seconds with 2WW maintaining SpO2 at 90% or greater to show improved safety and efficiency ambulating in home with reduced risk for falls  3.  Pt will decrease mod SUSAN to 20% impaired or less in order to demonstrate improved functional tolerances at PLOF with minimal restriction/dysfunction  4. Pt will demonstrate independence with a long term HEP for continued progress/maintenance after completion of PT      Pt. Education:  [x] Yes  [] No  [] Reviewed Prior HEP/Ed  Method of Education: [] Verbal  [] Demo  [] Written  Comprehension of Education:  [] Verbalizes understanding. [] Demonstrates understanding. [] Needs review. [] Demonstrates/verbalizes HEP/Ed previously given. Plan: [x] Continue per plan of care.    [] Other:      Treatment Charges: Mins Units   []  Modalities     [x]  Ther Exercise 40 3   []  Manual Therapy     []  Ther Activities     []  Aquatics     []  Neuromuscular     [] Vasocompression     [] Gait Training     [] Dry needling        [] 1 or 2 muscles        [] 3 or more muscles     []  Other     Total Treatment time 40 3     Time In: 2:00           Time Out: 2:40pm    Electronically signed by:  Harry Reyes PTA

## 2022-07-15 ENCOUNTER — HOSPITAL ENCOUNTER (OUTPATIENT)
Dept: PHYSICAL THERAPY | Age: 72
Setting detail: THERAPIES SERIES
Discharge: HOME | End: 2022-07-15
Payer: COMMERCIAL

## 2022-07-15 PROCEDURE — 97110 THERAPEUTIC EXERCISES: CPT

## 2022-07-15 PROCEDURE — 97112 NEUROMUSCULAR REEDUCATION: CPT

## 2022-07-15 NOTE — FLOWSHEET NOTE
[facility]patient Physical Therapy   [address]   Phone: [phone]   Fax: [phone]    Physical Therapy Daily Treatment Note      Date:  7/15/2022  Patient Name:  Román Shahid    :  1950  MRN: [id number]  Physician: Chacho Kearney MD                        Insurance: [id number]. Auth:  Eval + 12 visits through 22  Medical Diagnosis:   M54.42, M54.41, G89.29 (ICD-10-CM) - Chronic bilateral low back pain with bilateral sciatica   E66.01, Z68.41 (ICD-10-CM) - Morbid obesity with BMI of 40.0-44.9, [facility]   M47.817 (ICD-10-CM) - Lumbosacral spondylosis without myelopathy      Rehab Codes: M54.42, M54.41, R53.1, R26.9  Onset Date:                                Next 's appt: 22  Visit# / total visits: 3  Cancels/No Shows: 0/0    Precautions: Fall risk, requires SBA for safety for all standing tasks. Monitor SpO2 with exertion (pt has COPD and emphysema) and try to maintain at 90% or above through all activities. Subjective:    Pain:  [x] Yes  [] No Location: low back Pain Rating: (0-10 scale) 0/10  Pain altered Tx:  [] No  [] Yes  Action:  Comments: 7/15: Patient reports today with no new issues. Patient denied any pain upon arrival today, only soreness in BLE.      Objective:  INTERVENTIONS  INTERVENTIONS  Reps/ Time Weight/ Level Completed  Today Comments          SpO2/HR: SpO2: HR:     Beginnin% 82bpm     Durin-92%  bpm  Allowed pt to recover to 90% or above before proceeding to next exercise   End: 92% 80bpm            EXERCISES        Seated:       STS 5x2   1st set BUE support, 2nd set 1 UE support   Seated PPT 10x2 5\" hold  x    Seated Paloff Press 15x2 Green x           Standing:       EO NBOS  30''      EC NBOS 30''x2  x    NBOS with head turns 2x30''  x    Modified Tandem stance 30''x2      Balloon taps 2'   CGA, other staff member tapping balloon   Cone retrieval with 2WW 100ft x 6cones   CGA, challenging change of COG   Mini squats 10x  x No UE support   Marching on foam 30x  x 1UE support, CGA   Forward Step Ups 10x 6\" x    3-Way Hip 15x ea  x    Other:     Specific Instructions for next treatment Emphasis on functional, progressive B LE strengthening and endurance as appropriate. Incorporate both static and dynamic balance interventions as appropriate. Monitor exertion response and SpO2, adjusting intensity as needed. Assessment:       [x] Progressing toward goals. [] No change. [] Other:    [x] Patient would continue to benefit from skilled physical therapy services in order to: help modulate pain and spasms in B legs, improve B LE strength and functional endurance necessary to maximize independence and safety through all ADL and community activities. 7/15: Continued to work on BLE strength and balance with charted exercises. Additional exercises added today for additional strengthening and challenge to balance. Cues provided throughout exercises to encourage Min UE support to further challenge balance. Patient able to maintain satisfactory vitals as recorded above throughout session today.        GOALS:   STG: (to be met in 6 treatments)  Pt will self report worst pain no greater than 3/10 in order to better tolerate ADLs/work activities with minimal dysfunction  Pt will complete FTSTS in <20 seconds with minimal UE support or less maintaining SpO2 at 90% or greater to show improved efficiency with functional transitions in home and reduced fall risk  LTG: (to be met in 12 treatments)  Pt will demonstrate improved B LE strength to 4/5 or greater in order to demonstrate improved stability/strength necessary for unrestricted ADLs/work activities  Pt will complete TUG in <20 seconds with 2WW maintaining SpO2 at 90% or greater to show improved safety and efficiency ambulating in home with reduced risk for falls  Pt will decrease mod SUSAN to 20% impaired or less in order to demonstrate improved functional tolerances at PLOF with minimal restriction/dysfunction  Pt will demonstrate independence with a long term HEP for continued progress/maintenance after completion of PT      Pt. Education:  [x] Yes  [] No  [] Reviewed Prior HEP/Ed  Method of Education: [] Verbal  [] Demo  [] Written  Comprehension of Education:  [] Verbalizes understanding. [] Demonstrates understanding. [] Needs review. [] Demonstrates/verbalizes HEP/Ed previously given. Plan: [x] Continue per plan of care.    [] Other:      Treatment Charges: Mins Units   []  Modalities     [x]  Ther Exercise 35 2   []  Manual Therapy     []  Ther Activities     []  Aquatics     [x]  Neuromuscular 8 1   [] Vasocompression     [] Gait Training     [] Dry needling        [] 1 or 2 muscles        [] 3 or more muscles     []  Other     Total Treatment time 43 3     Time In: 1:47 pm           Time Out: 2:30 pm    Electronically signed by:  Brenda Mauricio PTA

## 2022-07-18 ENCOUNTER — HOSPITAL ENCOUNTER (OUTPATIENT)
Dept: PHYSICAL THERAPY | Age: 72
Setting detail: THERAPIES SERIES
Discharge: HOME | End: 2022-07-18
Payer: COMMERCIAL

## 2022-07-18 DIAGNOSIS — I25.10 CORONARY ARTERY DISEASE DUE TO LIPID RICH PLAQUE: ICD-10-CM

## 2022-07-18 DIAGNOSIS — I25.83 CORONARY ARTERY DISEASE DUE TO LIPID RICH PLAQUE: ICD-10-CM

## 2022-07-18 DIAGNOSIS — K59.01 SLOW TRANSIT CONSTIPATION: ICD-10-CM

## 2022-07-18 RX ORDER — PANTOPRAZOLE SODIUM 40 MG/1
TABLET, DELAYED RELEASE ORAL
Qty: 60 TABLET | Refills: 4 | Status: SHIPPED | OUTPATIENT
Start: 2022-07-18

## 2022-07-18 RX ORDER — CITALOPRAM 20 MG/1
TABLET ORAL
Qty: 30 TABLET | Refills: 4 | OUTPATIENT
Start: 2022-07-18

## 2022-07-18 RX ORDER — DOCUSATE SODIUM 100 MG/1
CAPSULE, LIQUID FILLED ORAL
Qty: 60 CAPSULE | Refills: 4 | Status: SHIPPED | OUTPATIENT
Start: 2022-07-18

## 2022-07-18 RX ORDER — ASPIRIN 81 MG/1
TABLET, DELAYED RELEASE ORAL
Qty: 90 TABLET | Refills: 4 | Status: SHIPPED | OUTPATIENT
Start: 2022-07-18

## 2022-07-18 RX ORDER — ISOSORBIDE DINITRATE 30 MG/1
TABLET ORAL
Qty: 30 TABLET | Refills: 4 | Status: SHIPPED | OUTPATIENT
Start: 2022-07-18

## 2022-07-18 NOTE — FLOWSHEET NOTE
[x] [facility]) [facility]  [address]  [place]: [phone]   F: [phone]     Physical Therapy Cancel/No Show note    Date: 2022  Patient: Doyle Shook  : 1950  MRN: [id number]    Visit Count: 3/12  Cancels/No Shows to date:     For today's appointment patient:    [x]  Cancelled    [] Rescheduled appointment    [] No-show     Reason given by patient:    []  Patient ill    []  Conflicting appointment    [] No transportation      [] Conflict with work    [] No reason given    [] Weather related    [] COVID-19    [x] Other:      Comments:  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       [x] Next appointment was confirmed    Electronically signed by: TANIYA Araya  All above treatment interventions and documentation were performed by a student physical therapist (SPT) under the direct supervision of licensed provider Racheal Quinn PT, DPT

## 2022-07-20 ENCOUNTER — HOSPITAL ENCOUNTER (OUTPATIENT)
Dept: PHYSICAL THERAPY | Age: 72
Setting detail: THERAPIES SERIES
Discharge: HOME | End: 2022-07-20
Payer: COMMERCIAL

## 2022-07-20 PROCEDURE — 97110 THERAPEUTIC EXERCISES: CPT

## 2022-07-20 RX ORDER — CITALOPRAM 20 MG/1
TABLET ORAL
Qty: 30 TABLET | Refills: 5 | OUTPATIENT
Start: 2022-07-20

## 2022-07-20 NOTE — FLOWSHEET NOTE
[address] [place] [address] Outpatient Physical Therapy   [address] [place] [address] [address]   Phone: [phone]   Fax: [phone]    Physical Therapy Daily Treatment Note      Date:  2022  Patient Name:  Sabi Gan    :  1950  MRN: [id number]  Physician: Raymundo Hernandez MD                        Insurance: [employer]. Auth:  Eval + 12 visits through 22  Medical Diagnosis:   M54.42, M54.41, G89.29 (ICD-10-CM) - Chronic bilateral low back pain with bilateral sciatica   E66.01, Z68.41 (ICD-10-CM) - Morbid obesity with BMI of 40.0-44.9, [facility]   M47.817 (ICD-10-CM) - Lumbosacral spondylosis without myelopathy      Rehab Codes: M54.42, M54.41, R53.1, R26.9  Onset Date:                                Next 's appt: 22  Visit# / total visits:   Cancels/No Shows: 0/0    Precautions: Fall risk, requires SBA for safety for all standing tasks. Monitor SpO2 with exertion (pt has COPD and emphysema) and try to maintain at 90% or above through all activities. Subjective:    Pain:  [x] Yes  [] No Location: Pelvic Region, occasionally into anterior thigh Pain Rating: (0-10 scale) 8-9/10  Pain altered Tx:  [] No  [] Yes  Action:  Comments: : Patient reports today with new pain in lower pelvic region. Reported that pain comes intermittently and feels initially like a cramp, followed by sharp pain. Reported pain also increases with standing. Pain comes intermittently with any trunk/LE motion. Reported no other issues, although patient with notable discomfort when pain experienced.          Objective:  INTERVENTIONS  INTERVENTIONS  Reps/ Time Weight/ Level Completed  Today Comments          SpO2/HR: SpO2: HR:     Beginnin% 80bpm x    Durin-92%  bpm  Allowed pt to recover to 90% or above before proceeding to next exercise   End: 92% 80bpm            EXERCISES        Seated:       STS 5x2   1st set BUE support, 2nd set 1 UE support   Seated PPT 10x2 5\" hold  x Seated Paloff Press 15x2 Green x    LAQ 15x2 3\" hold  x    Marches 15x  Red x RLE only   Hip Adduction isometric with ball 20x 3\" hold  x    Hip Abduction 10x2 Red x    Hamstring Curls 20x ea Red x    PPT with alternating UE extensions 10x2  Red x    PPT with alternating UE overhead reaches 10x2  x           Standing:       EO NBOS  30''      EC NBOS 30''x2      NBOS with head turns 2x30''      Modified Tandem stance 30''x2      Balloon taps 2'   CGA, other staff member tapping balloon   Cone retrieval with 2WW 100ft x 6cones   CGA, challenging change of COG   Mini squats 10x   No UE support   Marching on foam 30x   1UE support, CGA   Forward Step Ups 10x 6\"     3-Way Hip 15x ea      Other:     Specific Instructions for next treatment Emphasis on functional, progressive B LE strengthening and endurance as appropriate. Incorporate both static and dynamic balance interventions as appropriate. Monitor exertion response and SpO2, adjusting intensity as needed. Assessment:       [x] Progressing toward goals. [] No change. [] Other:    [x] Patient would continue to benefit from skilled physical therapy services in order to: help modulate pain and spasms in B legs, improve B LE strength and functional endurance necessary to maximize independence and safety through all ADL and community activities. 7/20: All exercises performed today seated secondary to increased pelvic pain and consistent pain in standing. Patient able to tolerate exercises with adequate rest breaks with bouts of pelvic pain. Patient with most difficulty with marching/hip abduction. Encouraged patient to address pain with physician for improved tolerance to therapy.           GOALS:   STG: (to be met in 6 treatments)  Pt will self report worst pain no greater than 3/10 in order to better tolerate ADLs/work activities with minimal dysfunction  Pt will complete FTSTS in <20 seconds with minimal UE support or less maintaining SpO2 at 90% or greater to show improved efficiency with functional transitions in home and reduced fall risk  LTG: (to be met in 12 treatments)  Pt will demonstrate improved B LE strength to 4/5 or greater in order to demonstrate improved stability/strength necessary for unrestricted ADLs/work activities  Pt will complete TUG in <20 seconds with 2WW maintaining SpO2 at 90% or greater to show improved safety and efficiency ambulating in home with reduced risk for falls  Pt will decrease mod SUSAN to 20% impaired or less in order to demonstrate improved functional tolerances at PLOF with minimal restriction/dysfunction  Pt will demonstrate independence with a long term HEP for continued progress/maintenance after completion of PT      Pt. Education:  [] Yes  [x] No  [] Reviewed Prior HEP/Ed  Method of Education: [] Verbal  [] Demo  [] Written  Comprehension of Education:  [] Verbalizes understanding. [] Demonstrates understanding. [] Needs review. [] Demonstrates/verbalizes HEP/Ed previously given. Plan: [x] Continue per plan of care.    [] Other:      Treatment Charges: Mins Units   []  Modalities     [x]  Ther Exercise 38 3   []  Manual Therapy     []  Ther Activities     []  Aquatics     []  Neuromuscular     [] Vasocompression     [] Gait Training     [] Dry needling        [] 1 or 2 muscles        [] 3 or more muscles     []  Other     Total Treatment time 38 3   ** 15 min of today's treatment performed by Modesto Nguyen PTA    Time In: 3:15 pm           Time Out: 3:53 pm    Electronically signed by:  Brenda Mauricio PTA

## 2022-07-25 ENCOUNTER — APPOINTMENT (OUTPATIENT)
Dept: CT IMAGING | Age: 72
DRG: 637 | End: 2022-07-25
Payer: COMMERCIAL

## 2022-07-25 ENCOUNTER — HOSPITAL ENCOUNTER (OUTPATIENT)
Dept: PHYSICAL THERAPY | Age: 72
Setting detail: THERAPIES SERIES
Discharge: HOME | End: 2022-07-25
Payer: COMMERCIAL

## 2022-07-25 ENCOUNTER — HOSPITAL ENCOUNTER (INPATIENT)
Age: 72
LOS: 2 days | Discharge: HOME HEALTH | DRG: 637 | End: 2022-07-28
Attending: EMERGENCY MEDICINE | Admitting: INTERNAL MEDICINE
Payer: COMMERCIAL

## 2022-07-25 ENCOUNTER — TELEPHONE (OUTPATIENT)
Dept: PRIMARY CARE CLINIC | Age: 72
End: 2022-07-25

## 2022-07-25 DIAGNOSIS — R07.89 OTHER CHEST PAIN: ICD-10-CM

## 2022-07-25 DIAGNOSIS — E11.42 TYPE 2 DIABETES MELLITUS WITH DIABETIC POLYNEUROPATHY, WITH LONG-TERM CURRENT USE OF INSULIN (HCC): Chronic | ICD-10-CM

## 2022-07-25 DIAGNOSIS — E83.42 HYPOMAGNESEMIA SYNDROME: ICD-10-CM

## 2022-07-25 DIAGNOSIS — R10.9 ABDOMINAL SPASMS: ICD-10-CM

## 2022-07-25 DIAGNOSIS — J18.9 PNEUMONIA OF LEFT LOWER LOBE DUE TO INFECTIOUS ORGANISM: ICD-10-CM

## 2022-07-25 DIAGNOSIS — Z79.4 TYPE 2 DIABETES MELLITUS WITH DIABETIC POLYNEUROPATHY, WITH LONG-TERM CURRENT USE OF INSULIN (HCC): Chronic | ICD-10-CM

## 2022-07-25 DIAGNOSIS — R42 DIZZINESS: Primary | ICD-10-CM

## 2022-07-25 DIAGNOSIS — E78.5 HYPERLIPIDEMIA WITH TARGET LDL LESS THAN 100: Chronic | ICD-10-CM

## 2022-07-25 PROBLEM — R77.8 ELEVATED TROPONIN: Status: ACTIVE | Noted: 2022-07-25

## 2022-07-25 PROBLEM — R79.89 ELEVATED TROPONIN: Status: ACTIVE | Noted: 2022-07-25

## 2022-07-25 PROBLEM — R94.31 PROLONGED Q-T INTERVAL ON ECG: Status: ACTIVE | Noted: 2022-07-25

## 2022-07-25 LAB
ABSOLUTE EOS #: 0.1 K/UL (ref 0–0.4)
ABSOLUTE LYMPH #: 2.2 K/UL (ref 1–4.8)
ABSOLUTE MONO #: 0.8 K/UL (ref 0.1–1.3)
ALBUMIN SERPL-MCNC: 4.3 G/DL (ref 3.5–5.2)
ALP BLD-CCNC: 72 U/L (ref 35–104)
ALT SERPL-CCNC: 39 U/L (ref 5–33)
ANION GAP SERPL CALCULATED.3IONS-SCNC: 10 MMOL/L (ref 9–17)
AST SERPL-CCNC: 37 U/L
BASOPHILS # BLD: 1 % (ref 0–2)
BASOPHILS ABSOLUTE: 0.1 K/UL (ref 0–0.2)
BILIRUB SERPL-MCNC: 0.32 MG/DL (ref 0.3–1.2)
BILIRUBIN URINE: NEGATIVE
BUN BLDV-MCNC: 21 MG/DL (ref 8–23)
CALCIUM SERPL-MCNC: 10.1 MG/DL (ref 8.6–10.4)
CHLORIDE BLD-SCNC: 91 MMOL/L (ref 98–107)
CHP ED QC CHECK: YES
CO2: 31 MMOL/L (ref 20–31)
COLOR: YELLOW
COMMENT UA: ABNORMAL
CREAT SERPL-MCNC: 1.08 MG/DL (ref 0.5–0.9)
EOSINOPHILS RELATIVE PERCENT: 1 % (ref 0–4)
GFR AFRICAN AMERICAN: >60 ML/MIN
GFR NON-AFRICAN AMERICAN: 50 ML/MIN
GFR SERPL CREATININE-BSD FRML MDRD: ABNORMAL ML/MIN/{1.73_M2}
GLUCOSE BLD-MCNC: 241 MG/DL (ref 70–99)
GLUCOSE BLD-MCNC: 291 MG/DL
GLUCOSE BLD-MCNC: 291 MG/DL (ref 65–105)
GLUCOSE URINE: ABNORMAL
HCT VFR BLD CALC: 42.7 % (ref 36–46)
HEMOGLOBIN: 14.4 G/DL (ref 12–16)
INFLUENZA A: NOT DETECTED
INFLUENZA B: NOT DETECTED
KETONES, URINE: NEGATIVE
LEGIONELLA PNEUMOPHILIA AG, URINE: NEGATIVE
LEUKOCYTE ESTERASE, URINE: NEGATIVE
LIPASE: 64 U/L (ref 13–60)
LYMPHOCYTES # BLD: 27 % (ref 24–44)
MAGNESIUM: 1.5 MG/DL (ref 1.6–2.6)
MCH RBC QN AUTO: 30.8 PG (ref 26–34)
MCHC RBC AUTO-ENTMCNC: 33.7 G/DL (ref 31–37)
MCV RBC AUTO: 91.2 FL (ref 80–100)
MONOCYTES # BLD: 9 % (ref 1–7)
NITRITE, URINE: NEGATIVE
PDW BLD-RTO: 13.7 % (ref 11.5–14.9)
PH UA: 5 (ref 5–8)
PLATELET # BLD: 211 K/UL (ref 150–450)
PMV BLD AUTO: 9.3 FL (ref 6–12)
POTASSIUM SERPL-SCNC: 4 MMOL/L (ref 3.7–5.3)
PROTEIN UA: NEGATIVE
RBC # BLD: 4.68 M/UL (ref 4–5.2)
SARS-COV-2 RNA, RT PCR: NOT DETECTED
SEG NEUTROPHILS: 62 % (ref 36–66)
SEGMENTED NEUTROPHILS ABSOLUTE COUNT: 5.2 K/UL (ref 1.3–9.1)
SODIUM BLD-SCNC: 132 MMOL/L (ref 135–144)
SOURCE: NORMAL
SOURCE: NORMAL
SPECIFIC GRAVITY UA: 1.02 (ref 1–1.03)
SPECIMEN DESCRIPTION: NORMAL
STREP PNEUMONIAE ANTIGEN: NEGATIVE
TOTAL PROTEIN: 7.5 G/DL (ref 6.4–8.3)
TROPONIN, HIGH SENSITIVITY: 12 NG/L (ref 0–14)
TROPONIN, HIGH SENSITIVITY: 15 NG/L (ref 0–14)
TURBIDITY: CLEAR
URINE HGB: NEGATIVE
UROBILINOGEN, URINE: NORMAL
WBC # BLD: 8.3 K/UL (ref 3.5–11)

## 2022-07-25 PROCEDURE — 84484 ASSAY OF TROPONIN QUANT: CPT

## 2022-07-25 PROCEDURE — 87449 NOS EACH ORGANISM AG IA: CPT

## 2022-07-25 PROCEDURE — 96375 TX/PRO/DX INJ NEW DRUG ADDON: CPT

## 2022-07-25 PROCEDURE — 96374 THER/PROPH/DIAG INJ IV PUSH: CPT

## 2022-07-25 PROCEDURE — A4216 STERILE WATER/SALINE, 10 ML: HCPCS | Performed by: EMERGENCY MEDICINE

## 2022-07-25 PROCEDURE — 97530 THERAPEUTIC ACTIVITIES: CPT

## 2022-07-25 PROCEDURE — 96361 HYDRATE IV INFUSION ADD-ON: CPT

## 2022-07-25 PROCEDURE — 87636 SARSCOV2 & INF A&B AMP PRB: CPT

## 2022-07-25 PROCEDURE — 70450 CT HEAD/BRAIN W/O DYE: CPT

## 2022-07-25 PROCEDURE — 86738 MYCOPLASMA ANTIBODY: CPT

## 2022-07-25 PROCEDURE — 82947 ASSAY GLUCOSE BLOOD QUANT: CPT

## 2022-07-25 PROCEDURE — 99285 EMERGENCY DEPT VISIT HI MDM: CPT

## 2022-07-25 PROCEDURE — 96367 TX/PROPH/DG ADDL SEQ IV INF: CPT

## 2022-07-25 PROCEDURE — 74176 CT ABD & PELVIS W/O CONTRAST: CPT

## 2022-07-25 PROCEDURE — 87899 AGENT NOS ASSAY W/OPTIC: CPT

## 2022-07-25 PROCEDURE — 85025 COMPLETE CBC W/AUTO DIFF WBC: CPT

## 2022-07-25 PROCEDURE — 6360000002 HC RX W HCPCS: Performed by: EMERGENCY MEDICINE

## 2022-07-25 PROCEDURE — 2580000003 HC RX 258: Performed by: EMERGENCY MEDICINE

## 2022-07-25 PROCEDURE — 96365 THER/PROPH/DIAG IV INF INIT: CPT

## 2022-07-25 PROCEDURE — 80048 BASIC METABOLIC PNL TOTAL CA: CPT

## 2022-07-25 PROCEDURE — 96368 THER/DIAG CONCURRENT INF: CPT

## 2022-07-25 PROCEDURE — 83735 ASSAY OF MAGNESIUM: CPT

## 2022-07-25 PROCEDURE — 36415 COLL VENOUS BLD VENIPUNCTURE: CPT

## 2022-07-25 PROCEDURE — 81003 URINALYSIS AUTO W/O SCOPE: CPT

## 2022-07-25 PROCEDURE — 93005 ELECTROCARDIOGRAM TRACING: CPT | Performed by: EMERGENCY MEDICINE

## 2022-07-25 PROCEDURE — 83690 ASSAY OF LIPASE: CPT

## 2022-07-25 PROCEDURE — 6370000000 HC RX 637 (ALT 250 FOR IP): Performed by: NURSE PRACTITIONER

## 2022-07-25 PROCEDURE — 87086 URINE CULTURE/COLONY COUNT: CPT

## 2022-07-25 PROCEDURE — 80053 COMPREHEN METABOLIC PANEL: CPT

## 2022-07-25 PROCEDURE — G0378 HOSPITAL OBSERVATION PER HR: HCPCS

## 2022-07-25 PROCEDURE — 2500000003 HC RX 250 WO HCPCS: Performed by: EMERGENCY MEDICINE

## 2022-07-25 RX ORDER — 0.9 % SODIUM CHLORIDE 0.9 %
1000 INTRAVENOUS SOLUTION INTRAVENOUS ONCE
Status: COMPLETED | OUTPATIENT
Start: 2022-07-25 | End: 2022-07-25

## 2022-07-25 RX ORDER — ENOXAPARIN SODIUM 100 MG/ML
30 INJECTION SUBCUTANEOUS 2 TIMES DAILY
Status: DISCONTINUED | OUTPATIENT
Start: 2022-07-25 | End: 2022-07-28 | Stop reason: HOSPADM

## 2022-07-25 RX ORDER — ONDANSETRON 4 MG/1
4 TABLET, ORALLY DISINTEGRATING ORAL EVERY 8 HOURS PRN
Status: CANCELLED | OUTPATIENT
Start: 2022-07-25

## 2022-07-25 RX ORDER — ACETAMINOPHEN 325 MG/1
650 TABLET ORAL EVERY 6 HOURS PRN
Status: DISCONTINUED | OUTPATIENT
Start: 2022-07-25 | End: 2022-07-28 | Stop reason: HOSPADM

## 2022-07-25 RX ORDER — POTASSIUM CHLORIDE 20 MEQ/1
40 TABLET, EXTENDED RELEASE ORAL PRN
Status: DISCONTINUED | OUTPATIENT
Start: 2022-07-25 | End: 2022-07-28 | Stop reason: HOSPADM

## 2022-07-25 RX ORDER — POTASSIUM CHLORIDE 7.45 MG/ML
10 INJECTION INTRAVENOUS PRN
Status: DISCONTINUED | OUTPATIENT
Start: 2022-07-25 | End: 2022-07-28 | Stop reason: HOSPADM

## 2022-07-25 RX ORDER — ACETAMINOPHEN 650 MG/1
650 SUPPOSITORY RECTAL EVERY 6 HOURS PRN
Status: DISCONTINUED | OUTPATIENT
Start: 2022-07-25 | End: 2022-07-28 | Stop reason: HOSPADM

## 2022-07-25 RX ORDER — ALBUTEROL SULFATE 2.5 MG/3ML
2.5 SOLUTION RESPIRATORY (INHALATION) EVERY 4 HOURS PRN
Status: DISCONTINUED | OUTPATIENT
Start: 2022-07-25 | End: 2022-07-28 | Stop reason: HOSPADM

## 2022-07-25 RX ORDER — MAGNESIUM SULFATE 1 G/100ML
1000 INJECTION INTRAVENOUS PRN
Status: DISCONTINUED | OUTPATIENT
Start: 2022-07-25 | End: 2022-07-28 | Stop reason: HOSPADM

## 2022-07-25 RX ORDER — SODIUM CHLORIDE 0.9 % (FLUSH) 0.9 %
5-40 SYRINGE (ML) INJECTION EVERY 12 HOURS SCHEDULED
Status: DISCONTINUED | OUTPATIENT
Start: 2022-07-25 | End: 2022-07-28 | Stop reason: HOSPADM

## 2022-07-25 RX ORDER — SODIUM CHLORIDE 0.9 % (FLUSH) 0.9 %
10 SYRINGE (ML) INJECTION PRN
Status: DISCONTINUED | OUTPATIENT
Start: 2022-07-25 | End: 2022-07-28 | Stop reason: HOSPADM

## 2022-07-25 RX ORDER — ONDANSETRON 2 MG/ML
4 INJECTION INTRAMUSCULAR; INTRAVENOUS EVERY 6 HOURS PRN
Status: CANCELLED | OUTPATIENT
Start: 2022-07-25

## 2022-07-25 RX ORDER — MAGNESIUM SULFATE HEPTAHYDRATE 40 MG/ML
2000 INJECTION, SOLUTION INTRAVENOUS ONCE
Status: COMPLETED | OUTPATIENT
Start: 2022-07-25 | End: 2022-07-25

## 2022-07-25 RX ORDER — DOXYCYCLINE 100 MG/1
100 CAPSULE ORAL EVERY 12 HOURS SCHEDULED
Status: DISCONTINUED | OUTPATIENT
Start: 2022-07-26 | End: 2022-07-28 | Stop reason: HOSPADM

## 2022-07-25 RX ORDER — ONDANSETRON 2 MG/ML
8 INJECTION INTRAMUSCULAR; INTRAVENOUS ONCE
Status: DISCONTINUED | OUTPATIENT
Start: 2022-07-25 | End: 2022-07-28 | Stop reason: HOSPADM

## 2022-07-25 RX ORDER — LANOLIN ALCOHOL/MO/W.PET/CERES
3 CREAM (GRAM) TOPICAL NIGHTLY PRN
Status: DISCONTINUED | OUTPATIENT
Start: 2022-07-25 | End: 2022-07-28 | Stop reason: HOSPADM

## 2022-07-25 RX ORDER — ONDANSETRON 2 MG/ML
8 INJECTION INTRAMUSCULAR; INTRAVENOUS ONCE
Status: COMPLETED | OUTPATIENT
Start: 2022-07-25 | End: 2022-07-25

## 2022-07-25 RX ORDER — POLYETHYLENE GLYCOL 3350 17 G/17G
17 POWDER, FOR SOLUTION ORAL DAILY PRN
Status: DISCONTINUED | OUTPATIENT
Start: 2022-07-25 | End: 2022-07-28 | Stop reason: HOSPADM

## 2022-07-25 RX ADMIN — SODIUM CHLORIDE 1000 ML: 9 INJECTION, SOLUTION INTRAVENOUS at 17:41

## 2022-07-25 RX ADMIN — FAMOTIDINE 20 MG: 10 INJECTION, SOLUTION INTRAVENOUS at 17:43

## 2022-07-25 RX ADMIN — ONDANSETRON 8 MG: 2 INJECTION INTRAMUSCULAR; INTRAVENOUS at 17:43

## 2022-07-25 RX ADMIN — CEFTRIAXONE SODIUM 1000 MG: 1 INJECTION, POWDER, FOR SOLUTION INTRAMUSCULAR; INTRAVENOUS at 22:02

## 2022-07-25 RX ADMIN — Medication 3 MG: at 23:41

## 2022-07-25 RX ADMIN — MAGNESIUM SULFATE HEPTAHYDRATE 2000 MG: 40 INJECTION, SOLUTION INTRAVENOUS at 20:47

## 2022-07-25 RX ADMIN — HYDROMORPHONE HYDROCHLORIDE 0.5 MG: 1 INJECTION, SOLUTION INTRAMUSCULAR; INTRAVENOUS; SUBCUTANEOUS at 17:43

## 2022-07-25 RX ADMIN — AZITHROMYCIN MONOHYDRATE 500 MG: 500 INJECTION, POWDER, LYOPHILIZED, FOR SOLUTION INTRAVENOUS at 22:40

## 2022-07-25 ASSESSMENT — ENCOUNTER SYMPTOMS
ABDOMINAL PAIN: 1
NAUSEA: 1
STRIDOR: 0
VOMITING: 1
COUGH: 1
CONSTIPATION: 1
SHORTNESS OF BREATH: 1
WHEEZING: 0
BLOOD IN STOOL: 0

## 2022-07-25 ASSESSMENT — PAIN SCALES - GENERAL: PAINLEVEL_OUTOF10: 7

## 2022-07-25 ASSESSMENT — PAIN DESCRIPTION - LOCATION: LOCATION: BACK

## 2022-07-25 ASSESSMENT — PAIN - FUNCTIONAL ASSESSMENT: PAIN_FUNCTIONAL_ASSESSMENT: NONE - DENIES PAIN

## 2022-07-25 ASSESSMENT — PAIN DESCRIPTION - DESCRIPTORS: DESCRIPTORS: DISCOMFORT

## 2022-07-25 NOTE — TELEPHONE ENCOUNTER
Liana-Physical Therapist called in stating pt arrived at clinic with Blood Sugar reading of 400, Alert and Oriented. C/o being thirsty, having visual disturbances seeing spots. Pt hasn't eating since 10 am and has taking 3 insulin shots with no improvement. Requesting suggestions on what the pt should do. Writer discussed with MD-Cedillo in the office due to pcp out of office. MD recommended pt go to ER. Liana informed. Expressed understanding.

## 2022-07-25 NOTE — ED PROVIDER NOTES
EMERGENCY DEPARTMENT ENCOUNTER    Pt Name: Bernabe Dougherty  MRN: [id number]  [place] 1950  Date of evaluation: 7/25/22  CHIEF COMPLAINT       Chief Complaint   Patient presents with    Dizziness     Was at physical therapy today, felt dizzy. Elevated BS     HISTORY OF PRESENT ILLNESS     Abdominal Pain  Pain location:  Generalized  Pain quality: aching    Pain radiates to:  Does not radiate  Pain severity:  Moderate  Onset quality:  Gradual  Duration:  2 days  Timing:  Constant  Progression:  Unchanged  Chronicity:  New  Relieved by:  Nothing  Worsened by:  Nothing  Ineffective treatments:  None tried  Associated symptoms: chest pain, cough and nausea    Associated symptoms comment:  Chest pain last week  Dizziness past couple days, worse with standing or moving  Cough also  On home oxygen    REVIEW OF SYSTEMS     Review of Systems   Respiratory:  Positive for cough. Cardiovascular:  Positive for chest pain. Gastrointestinal:  Positive for abdominal pain and nausea. Neurological:  Positive for dizziness. All other systems reviewed and are negative. PASTMEDICAL HISTORY     Past Medical History:   Diagnosis Date    Abdominal bloating 1/26/2021    Adenomatous polyp of ascending colon 1/26/2021    Allergic rhinitis     Anxiety 7/17/2013    Arthritis     Asthma     Atrial fibrillation (HCC)     Back pain     NERVE/DR. GRACIA    Benign hypertension with CKD (chronic kidney disease) stage III (HCC)     CAD (coronary artery disease) 3/21/2013    Caffeine use     2 coffee/day    CHF (congestive heart failure) (HCC)     Chronic kidney disease     CKD (chronic kidney disease) stage 3, GFR 30-59 ml/min (HCC)     COPD (chronic obstructive pulmonary disease) (HCC)     emphysema    Degeneration of lumbar or lumbosacral intervertebral disc     Depression     DM (diabetes mellitus) ([facility] [address]     Emphysema of lung (HCC)     Gastritis     GERD (gastroesophageal reflux disease)     Hearing loss     Hematuria     Hiatal hernia     HTN (hypertension), benign 6/28/2014    Hypertriglyceridemia     Incontinence of urine 9/25/2013    Irritable bowel syndrome with both constipation and diarrhea 1/26/2021    Knee arthropathy     Lumbosacral spondylosis without myelopathy 9/29/2014    Migraine headache     DR. BASIL [place]     Neuropathy     Obesity     On home oxygen therapy     2 L PER NC  HS AND PRN    HIGINIO on CPAP     Otitis media 1-26-15    Secondary diabetes mellitus with stage 3 chronic kidney disease (GFR 30-59) ([facility])     Stroke ([facility])      mini stroke. Tinnitus     Type 2 diabetes mellitus without complication ([facility])     Type II or unspecified type diabetes mellitus without mention of complication, not stated as uncontrolled     UTI (lower urinary tract infection)     Varicose vein      Past Problem List  Patient Active Problem List   Diagnosis Code    Chronic pain of left knee M25.562, G89.29    Type 2 diabetes mellitus with diabetic polyneuropathy, with long-term current use of insulin ([facility]) E11.42, Z79.4    COPD (chronic obstructive pulmonary disease) J44.9    Nonintractable migraine, unspecified migraine type G43.009    Coronary artery disease due to lipid rich plaque I25.10, I25.83    DDD (degenerative disc disease), lumbar M51.36    Chronic, continuous use of opioids F11.90    DDD (degenerative disc disease), cervical M50.30    Osteoarthritis of cervical spine M47.812    Medication monitoring encounter Z51.81    GERD (gastroesophageal reflux disease) K21.9    Essential hypertension I10    Mixed hyperlipidemia E78.2    Insomnia G47.00    Lumbosacral spondylosis without myelopathy M47.817    Sacroiliitis, not elsewhere classified ([facility] [address] M46.1    Carpal tunnel syndrome G56.00    Mixed stress and urge urinary incontinence N39.46    Bilateral low back pain with sciatica M54.40    Intractable migraine with aura without status migrainosus G43.119    Anxiety and depression F41.9, F32. A    Chronic migraine without aura without status migrainosus, not intractable G43.709    Morbid (severe) obesity with alveolar hypoventilation ([facility]) E66.2    Chronic nausea R11.0    Lung nodule R91.1    Neuropathy G62.9    Obstructive sleep apnea syndrome G47.33    Asthma with COPD ([facility]) J44.9    Obesity, Class III, BMI 40-49.9 (morbid obesity) ([facility]) E66.01    Stage 3 chronic kidney disease ([facility]) N18.30    Benign hypertension with CKD (chronic kidney disease) stage III ([facility]) I12.9, N18.30    Secondary diabetes mellitus with stage 3 chronic kidney disease (GFR 30-59) ([facility]) E13.22, N18.30    CKD (chronic kidney disease) stage 3, GFR 30-59 ml/min N18.30    Lumbar radiculopathy, chronic M54.16    Sessile colonic polyp K63.5    Morbid obesity ([facility]) E66.01    Adenomatous polyp of ascending colon D12.2    Irritable bowel syndrome with both constipation and diarrhea K58.2    Abdominal bloating R14.0    Chronic use of opiate drug for therapeutic purpose Z79.891    Major depressive disorder, single episode, unspecified F32.9    Permanent atrial fibrillation ([facility]) I48.21    Polyneuropathy, unspecified G62.9    Other chronic pain G89.29    Atypical chest pain R07.89    Left ventricular diastolic dysfunction J85.6    Transaminasemia R74.01    Acute respiratory failure with hypoxia and hypercarbia ([facility]) J96.01, J96.02    Macrocytosis D75.89    Morbid obesity with BMI of 40.0-44.9, adult ([facility]) E66.01, Z68.41    Chronic diastolic congestive heart failure ([facility]) I50.32    Oxygen dependent Z99.81    Chronic bilateral low back pain with bilateral sciatica M54.42, M54.41, G89.29    Elevated troponin R77.8     SURGICAL HISTORY       Past Surgical History:   Procedure Laterality Date    ABLATION OF DYSRHYTHMIC FOCUS  2000    CARPAL TUNNEL RELEASE Right     CATARACT REMOVAL WITH IMPLANT Bilateral     CHOLECYSTECTOMY  2007    COLONOSCOPY      COLONOSCOPY  04/10/2017    tubular adenomo polyp , mod. sigmoid diverticulosis, min. int. hemorrhoids    COLONOSCOPY N/A 3/2/2021 COLONOSCOPY WITH BIOPSY performed by Wanda Ring MD at [facility]  9/25/2013    DILATION AND CURETTAGE  1979    EYE SURGERY      laser    INCONTINENCE SURGERY      Percutaneous nerve stimulation Dr Claudia Rick Nov 2013     INSERTABLE CARDIAC MONITOR  12/04/2015    [employer] REVEAL LINQ MODEL #[id number] MRI CONDTIONAL OK 3T.  IMMEDIATELY POST IMPLANT    OTHER SURGICAL HISTORY  09/10/15    removal of interstim    WA COLSC FLX W/REMOVAL LESION BY HOT BX FORCEPS N/A 4/10/2017    COLONOSCOPY POLYPECTOMY HOT BIOPSY performed by Alexis Ojeda MD at [address] [facility]      TYMPANOSTOMY TUBE PLACEMENT  08/21/2017    UPPER GASTROINTESTINAL ENDOSCOPY  03/2016    Dr Marbin Medellin N/A 3/2/2021    EGD BIOPSY performed by Wanda Ring MD at [facility]       Previous Medications    ALBUTEROL (PROVENTIL) (2.5 MG/3ML) 0.083% NEBULIZER SOLUTION    Take 3 mLs by nebulization every 6 hours as needed for Wheezing    ALCOHOL SWABS (B-D SINGLE USE SWABS REGULAR) PADS    THREE TIMES A DAY    ATORVASTATIN (LIPITOR) 40 MG TABLET    Take 1 tablet by mouth daily    BIPAP MACHINE MISC    repair/replace Bipap maintain 18/9CMH2O, Cflex=3,mask,tubing,filters,headgear,heated humidifier,all supplies PRN    BLOOD GLUCOSE MONITORING SUPPL (ONE TOUCH ULTRA 2) W/DEVICE KIT        BLOOD GLUCOSE MONITORING SUPPL HARMEET    Use daily to check BS    BLOOD GLUCOSE TEST STRIPS (ONETOUCH ULTRA) STRIP    TEST TWO (2) TO THREE (3) TIMES A DAY & AS NEEDED FOR SYMPTOMS OF IRREGULAR BLOOD GLUCOSE    BLOOD GLUCOSE TEST STRIPS (TRUE METRIX BLOOD GLUCOSE TEST) STRIP    THREE TIMES A DAY    CARVEDILOL (COREG) 3.125 MG TABLET    TAKE 1 TAB BY MOUTH TWICE A DAY ( IN THE MORNING AND BEDTIME )    CITALOPRAM (CELEXA) 20 MG TABLET        CLOPIDOGREL (PLAVIX) 75 MG TABLET    TAKE 1 TAB BY MOUTH ONCE A DAY ( IN THE MORNING ) -THIS MEDICINE MAY BE TAKENWITH OR TAKE 1 TAB BY MOUTH TWICE A DAY ( IN THE MORNING AND BEDTIME )     MISC. DEVICES (ADJUST BATH/SHOWER SEAT/BACK) MISC    Use daily for shower    MULTIPLE VITAMINS-MINERALS (CERTAVITE/ANTIOXIDANTS) TABS    TAKE 1 TABLET BY MOUTH ONCE DAILY    NITROGLYCERIN (NITROSTAT) 0.4 MG SL TABLET    1 under the tongue as needed for angina, may repeat q5mins for up three doses    NYSTATIN (MYCOSTATIN) 753788 UNIT/GM POWDER    Apply 3 times daily. OMEPRAZOLE (PRILOSEC) 40 MG DELAYED RELEASE CAPSULE    Take 1 capsule by mouth every morning (before breakfast)    ONETOUCH ULTRA STRIP    TEST TWO (2) TO THREE (3) TIMES A DAY & AS NEEDED FOR SYMPTOMS OF IRREGULAR BLOOD GLUCOSE    OXYBUTYNIN (DITROPAN-XL) 5 MG EXTENDED RELEASE TABLET    TAKE 1 TABLET BY MOUTH DAILY    OXYCODONE-ACETAMINOPHEN (PERCOCET) 5-325 MG PER TABLET    Take 1 tablet by mouth every 8 hours as needed for Pain for up to 30 days. Intended supply: 30 days. OXYGEN    Inhale 3 L into the lungs     PANTOPRAZOLE (PROTONIX) 40 MG TABLET    TAKE 1 TABLET BY MOUTH TWICE A DAY    PSYLLIUM (METAMUCIL PO)    Take by mouth 3 times daily Takes powder    RANOLAZINE (RANEXA) 500 MG EXTENDED RELEASE TABLET    Take 1 tablet by mouth 2 times daily for 14 days    SM ASPIRIN ADULT LOW STRENGTH 81 MG EC TABLET    TAKE 1 TABLET BY MOUTH ONCE DAILY    SYMBICORT 160-4.5 MCG/ACT AERO      2 puffs As needed for sob    TOPIRAMATE (TOPAMAX) 100 MG TABLET    TAKE 1 TABLET BY MOUTH NIGHTLY     ULTICARE MINI PEN NEEDLES 31G X 6 MM MISC    USE AS DIRECTED    VITAMIN B-12 (CYANOCOBALAMIN) 100 MCG TABLET    take half a tablet BY MOUTH ONCE DAILY     ALLERGIES     is allergic to robitussin [guaifenesin], codeine, compazine [prochlorperazine maleate], iodides, morphine, moxifloxacin, reglan [metoclopramide], avelox [moxifloxacin hcl in nacl], cipro xr, sulfa antibiotics, and zofran [ondansetron hcl]. FAMILY HISTORY     She indicated that her mother is .  She indicated that her father is . She indicated that her sister is . She indicated that the status of her maternal grandmother is unknown. SOCIAL HISTORY       Social History     Tobacco Use    Smoking status: Former     Packs/day: 3.00     Years: 41.00     Pack years: 123.00     Types: Cigarettes     Quit date: 2000     Years since quittin.5    Smokeless tobacco: Never    Tobacco comments:     quit in    Vaping Use    Vaping Use: Never used   Substance Use Topics    Alcohol use: No     Alcohol/week: 0.0 standard drinks    Drug use: No     PHYSICAL EXAM     INITIAL VITALS: BP (!) 151/75   Pulse 80   Temp 98 °F (36.7 °C) (Oral)   Resp 16   Ht 5' 2\" (1.575 m)   Wt 249 lb (112.9 kg)   LMP  (LMP Unknown)   SpO2 96%   BMI 45.54 kg/m²    Physical Exam  Constitutional:       General: She is not in acute distress. Appearance: Normal appearance. She is well-developed. She is not diaphoretic. HENT:      Head: Normocephalic and atraumatic. Right Ear: External ear normal.      Left Ear: External ear normal.      Nose: Nose normal. No congestion. Mouth/Throat:      Mouth: Mucous membranes are moist.      Pharynx: Oropharynx is clear. Eyes:      General:         Right eye: No discharge. Left eye: No discharge. Conjunctiva/sclera: Conjunctivae normal.      Pupils: Pupils are equal, round, and reactive to light. Neck:      Trachea: No tracheal deviation. Cardiovascular:      Rate and Rhythm: Normal rate and regular rhythm. Pulses: Normal pulses. Heart sounds: Normal heart sounds. Pulmonary:      Effort: Pulmonary effort is normal. No respiratory distress. Breath sounds: Normal breath sounds. No stridor. No wheezing or rales. Abdominal:      Palpations: Abdomen is soft. Tenderness: There is abdominal tenderness. There is no guarding or rebound. Comments: Tenderness epigastric and llq   Musculoskeletal:         General: No tenderness or deformity.  Normal range of motion. Cervical back: Normal range of motion and neck supple. Skin:     General: Skin is warm and dry. Capillary Refill: Capillary refill takes less than 2 seconds. Findings: No erythema or rash. Neurological:      General: No focal deficit present. Mental Status: She is alert and oriented to person, place, and time. Coordination: Coordination normal.      Comments: GCS 15  Strength in arms 5/5  Strength in legs 5/5  Sensation intact bl arms and legs  No facial droop  Speech is clear, no dysarthria or aphasia  No ataxia in arms or legs  No gaze preference or nystagums  Visual fields intact  NIHSS 0   Psychiatric:         Mood and Affect: Mood normal.         Behavior: Behavior normal.         Thought Content: Thought content normal.         Judgment: Judgment normal.       MEDICAL DECISION MAKING:   Iv mag replacement  Iv abx for pneumonia  Covid neg  Admitting to medicine  Do not suspect stroke or sepsis  Mild trop elevation, will repeat         Procedures    DIAGNOSTIC RESULTS   EKG:All EKG's are interpreted by the Emergency Department Physician who either signs or Co-signs this chart in the absence of a cardiologist.  SR with SA, normal rate and intervals, nonspecific changes unchanged compared to old ekg      RADIOLOGY:All plain film, CT, MRI, and formal ultrasound images (except ED bedside ultrasound) are read by the radiologist, see reports below, unless otherwisenoted in MDM or here. CT HEAD WO CONTRAST   Final Result   No acute intracranial abnormality. CT ABDOMEN PELVIS WO CONTRAST Additional Contrast? None   Final Result   1. No acute intra-abdominal abnormality. 2. Faint ground-glass opacity in the left lower lobe, likely infectious or   inflammatory. 3. 8 mm right middle lobe nodule is unchanged, suggestive of benignity. 4. Nonobstructing right nephrolithiasis.            LABS: All lab results were reviewed by myself, and all abnormals are listed below.   Labs Reviewed   CBC WITH AUTO DIFFERENTIAL - Abnormal; Notable for the following components:       Result Value    Monocytes 9 (*)     All other components within normal limits   COMPREHENSIVE METABOLIC PANEL - Abnormal; Notable for the following components:    Glucose 241 (*)     Creatinine 1.08 (*)     Sodium 132 (*)     Chloride 91 (*)     ALT 39 (*)     AST 37 (*)     GFR Non- 50 (*)     All other components within normal limits   LIPASE - Abnormal; Notable for the following components:    Lipase 64 (*)     All other components within normal limits   MAGNESIUM - Abnormal; Notable for the following components:    Magnesium 1.5 (*)     All other components within normal limits   TROPONIN - Abnormal; Notable for the following components:    Troponin, High Sensitivity 15 (*)     All other components within normal limits   URINALYSIS WITH REFLEX TO CULTURE - Abnormal; Notable for the following components:    Glucose, Ur LARGE (*)     All other components within normal limits   COVID-19 & INFLUENZA COMBO   CULTURE, URINE   TROPONIN       EMERGENCY DEPARTMENTCOURSE:         Vitals:    Vitals:    07/25/22 1640 07/25/22 1944   BP: (!) 140/68 (!) 151/75   Pulse: 78 80   Resp: 16 16   Temp: 98 °F (36.7 °C)    TempSrc: Oral    SpO2: 96% 96%   Weight: 249 lb (112.9 kg)    Height: 5' 2\" (1.575 m)        The patient was given the following medications while in the emergency department:  Orders Placed This Encounter   Medications    0.9 % sodium chloride bolus    ondansetron (ZOFRAN) injection 8 mg    famotidine (PEPCID) 20 mg in sodium chloride (PF) 10 mL injection    HYDROmorphone (DILAUDID) injection 0.5 mg    magnesium sulfate 2000 mg in water 50 mL IVPB    cefTRIAXone (ROCEPHIN) 1,000 mg in dextrose 5 % 50 mL IVPB mini-bag     Order Specific Question:   Antimicrobial Indications     Answer:   Pneumonia (CAP)    azithromycin (ZITHROMAX) 500 mg in D5W 250ml addavial     Order Specific Question: Antimicrobial Indications     Answer:   Pneumonia (CAP)     CONSULTS:  IP CONSULT TO INTERNAL MEDICINE  IP CONSULT TO CARDIOLOGY    FINAL IMPRESSION      1. Dizziness    2. Hypomagnesemia syndrome    3. Pneumonia of left lower lobe due to infectious organism          DISPOSITION/PLAN   DISPOSITION Admitted 07/25/2022 08:05:10 PM      PATIENT REFERRED TO:  No follow-up provider specified. DISCHARGE MEDICATIONS:  New Prescriptions    No medications on file     The care is provided during an unprecedented national emergency due to the novel coronavirus, COVID 19.   MD Dolores Morgan MD  07/25/22 [id number]

## 2022-07-25 NOTE — TELEPHONE ENCOUNTER
Please contact patient and review current medication use for DM. Should be on Trulicity 1.5 mg once weekly along with Novolog pen, Basaglar 55 units BID and Metformin 1000 mg BID. Please encourage her to call and schedule DM education as I do not see that she's been scheduled after her referral last visit.

## 2022-07-26 ENCOUNTER — TELEPHONE (OUTPATIENT)
Dept: PRIMARY CARE CLINIC | Age: 72
End: 2022-07-26

## 2022-07-26 PROBLEM — J96.11 CHRONIC RESPIRATORY FAILURE WITH HYPOXIA (HCC): Status: ACTIVE | Noted: 2022-07-26

## 2022-07-26 PROBLEM — J18.9 PNEUMONIA: Status: ACTIVE | Noted: 2022-07-26

## 2022-07-26 LAB
ANION GAP SERPL CALCULATED.3IONS-SCNC: 10 MMOL/L (ref 9–17)
BUN BLDV-MCNC: 17 MG/DL (ref 8–23)
CALCIUM SERPL-MCNC: 8.7 MG/DL (ref 8.6–10.4)
CHLORIDE BLD-SCNC: 96 MMOL/L (ref 98–107)
CO2: 27 MMOL/L (ref 20–31)
CREAT SERPL-MCNC: 0.99 MG/DL (ref 0.5–0.9)
EKG ATRIAL RATE: 83 BPM
EKG P AXIS: 56 DEGREES
EKG P-R INTERVAL: 128 MS
EKG Q-T INTERVAL: 382 MS
EKG QRS DURATION: 70 MS
EKG QTC CALCULATION (BAZETT): 448 MS
EKG R AXIS: -35 DEGREES
EKG T AXIS: 87 DEGREES
EKG VENTRICULAR RATE: 83 BPM
GFR AFRICAN AMERICAN: >60 ML/MIN
GFR NON-AFRICAN AMERICAN: 55 ML/MIN
GFR SERPL CREATININE-BSD FRML MDRD: ABNORMAL ML/MIN/{1.73_M2}
GLUCOSE BLD-MCNC: 234 MG/DL (ref 65–105)
GLUCOSE BLD-MCNC: 282 MG/DL (ref 65–105)
GLUCOSE BLD-MCNC: 299 MG/DL (ref 70–99)
GLUCOSE BLD-MCNC: 341 MG/DL (ref 65–105)
GLUCOSE BLD-MCNC: 346 MG/DL (ref 65–105)
GLUCOSE BLD-MCNC: 408 MG/DL (ref 65–105)
MAGNESIUM: 1.9 MG/DL (ref 1.6–2.6)
POTASSIUM SERPL-SCNC: 4.1 MMOL/L (ref 3.7–5.3)
SODIUM BLD-SCNC: 133 MMOL/L (ref 135–144)

## 2022-07-26 PROCEDURE — G0378 HOSPITAL OBSERVATION PER HR: HCPCS

## 2022-07-26 PROCEDURE — 94761 N-INVAS EAR/PLS OXIMETRY MLT: CPT

## 2022-07-26 PROCEDURE — 2700000000 HC OXYGEN THERAPY PER DAY

## 2022-07-26 PROCEDURE — 97166 OT EVAL MOD COMPLEX 45 MIN: CPT

## 2022-07-26 PROCEDURE — 96372 THER/PROPH/DIAG INJ SC/IM: CPT

## 2022-07-26 PROCEDURE — 97530 THERAPEUTIC ACTIVITIES: CPT

## 2022-07-26 PROCEDURE — 97162 PT EVAL MOD COMPLEX 30 MIN: CPT

## 2022-07-26 PROCEDURE — 2580000003 HC RX 258: Performed by: NURSE PRACTITIONER

## 2022-07-26 PROCEDURE — 6370000000 HC RX 637 (ALT 250 FOR IP): Performed by: INTERNAL MEDICINE

## 2022-07-26 PROCEDURE — 6360000002 HC RX W HCPCS: Performed by: NURSE PRACTITIONER

## 2022-07-26 PROCEDURE — 93010 ELECTROCARDIOGRAM REPORT: CPT | Performed by: INTERNAL MEDICINE

## 2022-07-26 PROCEDURE — 94660 CPAP INITIATION&MGMT: CPT

## 2022-07-26 PROCEDURE — 1200000000 HC SEMI PRIVATE

## 2022-07-26 PROCEDURE — 99223 1ST HOSP IP/OBS HIGH 75: CPT | Performed by: INTERNAL MEDICINE

## 2022-07-26 PROCEDURE — 93005 ELECTROCARDIOGRAM TRACING: CPT | Performed by: NURSE PRACTITIONER

## 2022-07-26 PROCEDURE — 6370000000 HC RX 637 (ALT 250 FOR IP): Performed by: NURSE PRACTITIONER

## 2022-07-26 PROCEDURE — 82947 ASSAY GLUCOSE BLOOD QUANT: CPT

## 2022-07-26 RX ORDER — SODIUM CHLORIDE 9 MG/ML
INJECTION, SOLUTION INTRAVENOUS PRN
Status: DISCONTINUED | OUTPATIENT
Start: 2022-07-26 | End: 2022-07-28 | Stop reason: HOSPADM

## 2022-07-26 RX ORDER — DEXTROSE MONOHYDRATE 100 MG/ML
INJECTION, SOLUTION INTRAVENOUS CONTINUOUS PRN
Status: DISCONTINUED | OUTPATIENT
Start: 2022-07-26 | End: 2022-07-26

## 2022-07-26 RX ORDER — INSULIN LISPRO 100 [IU]/ML
0-8 INJECTION, SOLUTION INTRAVENOUS; SUBCUTANEOUS
Status: DISCONTINUED | OUTPATIENT
Start: 2022-07-27 | End: 2022-07-28 | Stop reason: HOSPADM

## 2022-07-26 RX ORDER — INSULIN GLARGINE 100 [IU]/ML
55 INJECTION, SOLUTION SUBCUTANEOUS 2 TIMES DAILY
Status: DISCONTINUED | OUTPATIENT
Start: 2022-07-26 | End: 2022-07-28 | Stop reason: HOSPADM

## 2022-07-26 RX ORDER — INSULIN LISPRO 100 [IU]/ML
0-4 INJECTION, SOLUTION INTRAVENOUS; SUBCUTANEOUS NIGHTLY
Status: DISCONTINUED | OUTPATIENT
Start: 2022-07-26 | End: 2022-07-28 | Stop reason: HOSPADM

## 2022-07-26 RX ORDER — SODIUM CHLORIDE 9 MG/ML
INJECTION, SOLUTION INTRAVENOUS CONTINUOUS
Status: DISCONTINUED | OUTPATIENT
Start: 2022-07-26 | End: 2022-07-28 | Stop reason: HOSPADM

## 2022-07-26 RX ORDER — CARVEDILOL 6.25 MG/1
6.25 TABLET ORAL 2 TIMES DAILY
Status: DISCONTINUED | OUTPATIENT
Start: 2022-07-26 | End: 2022-07-28 | Stop reason: HOSPADM

## 2022-07-26 RX ORDER — DEXTROSE MONOHYDRATE 100 MG/ML
INJECTION, SOLUTION INTRAVENOUS CONTINUOUS PRN
Status: DISCONTINUED | OUTPATIENT
Start: 2022-07-26 | End: 2022-07-28 | Stop reason: HOSPADM

## 2022-07-26 RX ORDER — ATORVASTATIN CALCIUM 40 MG/1
40 TABLET, FILM COATED ORAL DAILY
Status: DISCONTINUED | OUTPATIENT
Start: 2022-07-26 | End: 2022-07-28 | Stop reason: HOSPADM

## 2022-07-26 RX ORDER — BENZONATATE 200 MG/1
200 CAPSULE ORAL 3 TIMES DAILY
Status: DISCONTINUED | OUTPATIENT
Start: 2022-07-26 | End: 2022-07-28 | Stop reason: HOSPADM

## 2022-07-26 RX ORDER — LOSARTAN POTASSIUM 25 MG/1
25 TABLET ORAL DAILY
Status: DISCONTINUED | OUTPATIENT
Start: 2022-07-26 | End: 2022-07-28 | Stop reason: HOSPADM

## 2022-07-26 RX ORDER — CLOPIDOGREL BISULFATE 75 MG/1
75 TABLET ORAL DAILY
Status: DISCONTINUED | OUTPATIENT
Start: 2022-07-26 | End: 2022-07-28 | Stop reason: HOSPADM

## 2022-07-26 RX ADMIN — BENZONATATE 200 MG: 200 CAPSULE ORAL at 15:28

## 2022-07-26 RX ADMIN — CEFTRIAXONE SODIUM 1000 MG: 1 INJECTION, POWDER, FOR SOLUTION INTRAMUSCULAR; INTRAVENOUS at 22:01

## 2022-07-26 RX ADMIN — SODIUM CHLORIDE: 9 INJECTION, SOLUTION INTRAVENOUS at 01:53

## 2022-07-26 RX ADMIN — DOXYCYCLINE 100 MG: 100 CAPSULE ORAL at 21:33

## 2022-07-26 RX ADMIN — CARVEDILOL 6.25 MG: 6.25 TABLET, FILM COATED ORAL at 08:40

## 2022-07-26 RX ADMIN — ENOXAPARIN SODIUM 30 MG: 100 INJECTION SUBCUTANEOUS at 00:02

## 2022-07-26 RX ADMIN — SODIUM CHLORIDE: 9 INJECTION, SOLUTION INTRAVENOUS at 21:59

## 2022-07-26 RX ADMIN — BENZONATATE 200 MG: 200 CAPSULE ORAL at 21:33

## 2022-07-26 RX ADMIN — Medication 3 MG: at 21:33

## 2022-07-26 RX ADMIN — ACETAMINOPHEN 650 MG: 325 TABLET ORAL at 01:47

## 2022-07-26 RX ADMIN — DOXYCYCLINE 100 MG: 100 CAPSULE ORAL at 08:40

## 2022-07-26 RX ADMIN — ATORVASTATIN CALCIUM 40 MG: 40 TABLET, FILM COATED ORAL at 08:40

## 2022-07-26 RX ADMIN — CLOPIDOGREL BISULFATE 75 MG: 75 TABLET ORAL at 08:40

## 2022-07-26 RX ADMIN — ENOXAPARIN SODIUM 30 MG: 100 INJECTION SUBCUTANEOUS at 08:39

## 2022-07-26 RX ADMIN — ENOXAPARIN SODIUM 30 MG: 100 INJECTION SUBCUTANEOUS at 21:33

## 2022-07-26 RX ADMIN — LOSARTAN POTASSIUM 25 MG: 25 TABLET, FILM COATED ORAL at 08:40

## 2022-07-26 RX ADMIN — CARVEDILOL 6.25 MG: 6.25 TABLET, FILM COATED ORAL at 21:47

## 2022-07-26 RX ADMIN — SODIUM CHLORIDE, PRESERVATIVE FREE 10 ML: 5 INJECTION INTRAVENOUS at 00:15

## 2022-07-26 RX ADMIN — BENZONATATE 200 MG: 200 CAPSULE ORAL at 08:40

## 2022-07-26 RX ADMIN — SODIUM CHLORIDE, PRESERVATIVE FREE 10 ML: 5 INJECTION INTRAVENOUS at 08:41

## 2022-07-26 ASSESSMENT — ENCOUNTER SYMPTOMS
NAUSEA: 1
COUGH: 1
CONSTIPATION: 1
VOMITING: 1
SHORTNESS OF BREATH: 1
STRIDOR: 0
WHEEZING: 0
BLOOD IN STOOL: 0
ABDOMINAL PAIN: 1

## 2022-07-26 ASSESSMENT — PAIN SCALES - GENERAL: PAINLEVEL_OUTOF10: 8

## 2022-07-26 ASSESSMENT — PAIN DESCRIPTION - LOCATION: LOCATION: HEAD

## 2022-07-26 ASSESSMENT — PAIN DESCRIPTION - DESCRIPTORS: DESCRIPTORS: THROBBING

## 2022-07-26 NOTE — TELEPHONE ENCOUNTER
[facility] regarding [facility].

## 2022-07-26 NOTE — DISCHARGE INSTR - COC
Continuity of Care Form    Patient Name: Monique Wilkes   :  1950  MRN:  [id number]    Admit date:  2022  Discharge date:  ***    Code Status Order: Full Code   Advance Directives:     Admitting Physician:  Alys Schlatter, MD  PCP: VERONIKA Cardona - CNP    Discharging Nurse: [facility] Unit/Room#: 2114/2114-01  Discharging Unit Phone Number: ***    Emergency Contact:   Extended Emergency Contact Information  Primary Emergency Contact: Aden Kirkland *HIPAA  Address: [place] [address] Phone: [phone]  Work Phone: [phone]  Mobile Phone: [phone]  Relation: Child    Past Surgical History:  Past Surgical History:   Procedure Laterality Date    ABLATION OF DYSRHYTHMIC FOCUS      CARPAL TUNNEL RELEASE Right     CATARACT REMOVAL WITH IMPLANT Bilateral     CHOLECYSTECTOMY      COLONOSCOPY      COLONOSCOPY  04/10/2017    tubular adenomo polyp , mod. sigmoid diverticulosis, min. int. hemorrhoids    COLONOSCOPY N/A 3/2/2021    COLONOSCOPY WITH BIOPSY performed by Liz Jarvis MD at [place] [address]  2013    DILATION AND [facility]      Percutaneous nerve stimulation Dr Kecia Rodriguez 2013     INSERTABLE CARDIAC MONITOR  2015    [employer] Marea Mimes #[id number] MRI CONDTIONAL OK 3T.  IMMEDIATELY POST IMPLANT    OTHER SURGICAL HISTORY  09/10/15    removal of interstim    MO COLSC FLX W/REMOVAL LESION BY HOT BX FORCEPS N/A 4/10/2017    COLONOSCOPY POLYPECTOMY HOT BIOPSY performed by Nohemi Peabody, MD at [place] [address]  2017    UPPER GASTROINTESTINAL ENDOSCOPY  2016    Dr Jihan Botello N/A 3/2/2021    EGD BIOPSY performed by Liz Jarvis MD at [facility]       Immunization History:   Immunization History   Administered Date(s) Administered    COVID-19, [address] [facility] [address], Primary or Immunocompromised, (age 12y+), IM, 100 mcg/0.5mL 02/10/2021, 03/10/2021, 11/03/2021    COVID-19, [place] Vaccine, Vaccine Unspecified 02/10/2021, 03/10/2021, 11/03/2021, 05/20/2022    Influenza 10/18/2012, 10/07/2013    Influenza Virus Vaccine 10/02/2014, 10/20/2015    Influenza Whole 09/01/2010    Influenza, High Dose (Fluzone 65 yrs and older) 11/10/2016, 10/19/2017, 10/08/2018    Influenza, Josselyn Diaz, IM, PF (6 mo and older Fluzone, Flulaval, Fluarix, and 3 yrs and older Afluria) 11/02/2019, 10/08/2021    Influenza, Quadv, adjuvanted, 65 yrs +, IM, PF (Fluad) 09/14/2020    Pneumococcal Conjugate 13-valent (Veovtsf65) 06/15/2016    Pneumococcal Polysaccharide (Epoogkhqt14) 01/01/2009, 09/11/2017    Zoster Recombinant (Shingrix) 05/20/2022       Active Problems:  Patient Active Problem List   Diagnosis Code    Chronic pain of left knee M25.562, G89.29    Type 2 diabetes mellitus with diabetic polyneuropathy, with long-term current use of insulin ([facility]) E11.42, Z79.4    COPD (chronic obstructive pulmonary disease) J44.9    Nonintractable migraine, unspecified migraine type G43.009    Coronary artery disease due to lipid rich plaque I25.10, I25.83    DDD (degenerative disc disease), lumbar M51.36    Chronic, continuous use of opioids F11.90    DDD (degenerative disc disease), cervical M50.30    Osteoarthritis of cervical spine M47.812    Medication monitoring encounter Z51.81    GERD (gastroesophageal reflux disease) K21.9    Essential hypertension I10    Mixed hyperlipidemia E78.2    Insomnia G47.00    Lumbosacral spondylosis without myelopathy M47.817    Sacroiliitis, not elsewhere classified (HCC) M46.1    Carpal tunnel syndrome G56.00    Mixed stress and urge urinary incontinence N39.46    Bilateral low back pain with sciatica M54.40    Intractable migraine with aura without status migrainosus G43.119    Anxiety and depression F41.9, F32. A    Chronic migraine without aura without status migrainosus, not intractable G43.709    Morbid (severe) obesity with alveolar hypoventilation ([facility]) E66.2    Chronic nausea R11.0    Lung nodule R91.1    Neuropathy G62.9    Obstructive sleep apnea syndrome G47.33    Asthma with COPD ([facility]) J44.9    Obesity, Class III, BMI 40-49.9 (morbid obesity) ([facility]) E66.01    Stage 3 chronic kidney disease ([facility]) N18.30    Benign hypertension with CKD (chronic kidney disease) stage III ([facility]) I12.9, N18.30    Secondary diabetes mellitus with stage 3 chronic kidney disease (GFR 30-59) ([facility]) E13.22, N18.30    CKD (chronic kidney disease) stage 3, GFR 30-59 ml/min N18.30    Lumbar radiculopathy, chronic M54.16    Sessile colonic polyp K63.5    Morbid obesity ([facility]) E66.01    Adenomatous polyp of ascending colon D12.2    Irritable bowel syndrome with both constipation and diarrhea K58.2    Abdominal bloating R14.0    Chronic use of opiate drug for therapeutic purpose Z79.891    Major depressive disorder, single episode, unspecified F32.9    Permanent atrial fibrillation ([facility]) I48.21    Polyneuropathy, unspecified G62.9    Other chronic pain G89.29    Atypical chest pain R07.89    Left ventricular diastolic dysfunction J68.7    Transaminasemia R74.01    Acute respiratory failure with hypoxia and hypercarbia ([facility]) J96.01, J96.02    Macrocytosis D75.89    Morbid obesity with BMI of 40.0-44.9, adult ([facility]) E66.01, Z68.41    Chronic diastolic congestive heart failure ([facility]) I50.32    Oxygen dependent Z99.81    Chronic bilateral low back pain with bilateral sciatica M54.42, M54.41, G89.29    Elevated troponin R77.8    Left lower lobe pneumonia J18.9    Prolonged Q-T interval on ECG R94.31    Chronic respiratory failure with hypoxia ([facility]) J96.11       Isolation/Infection:   Isolation            No Isolation          Patient Infection Status       Infection Onset Added Last Indicated Last Indicated By Review Planned Expiration Resolved Resolved By    None active    Resolved    COVID-19 (Rule Out) 22 COVID-19 & Influenza Combo (Ordered)   22 Rule-Out Test Resulted    COVID-19 (Rule Out) 22 COVID-19, Rapid (Ordered)   22 Rule-Out Test Resulted    COVID-19 (Rule Out) 21 COVID-19 (Ordered)   21 Rule-Out Test Resulted    COVID-19 (Rule Out) 20 COVID-19 (Ordered)   20 Rule-Out Test Resulted            Nurse Assessment:  Last Vital Signs: BP (!) 134/51   Pulse 73   Temp 97.3 °F (36.3 °C) (Oral)   Resp 18   Ht 5' 2\" (1.575 m)   Wt 243 lb 2.7 oz (110.3 kg)   LMP  (LMP Unknown)   SpO2 92%   BMI 44.48 kg/m²     Last documented pain score (0-10 scale): Pain Level: 8  Last Weight:   Wt Readings from Last 1 Encounters:   22 243 lb 2.7 oz (110.3 kg)     Mental Status:  {IP PT MENTAL STATUS:42452}    IV Access:  { MAY IV ACCESS:874637975}    Nursing Mobility/ADLs:  Walking   {P DME LVNX:171337806}  Transfer  {P DME NUHJ:369490467}  Bathing  {P DME UQCX:028299698}  Dressing  {P DME KTI}  Toileting  {P DME JIYV:912804982}  Feeding  {P DME QLC}  Med Admin  {P DME LKKA:121808248}  Med Delivery   { MAY MED Delivery:854224085}    Wound Care Documentation and Therapy:        Elimination:  Continence:    Bowel: {YES / VO:66081}  Bladder: {YES / DR:12192}  Urinary Catheter: {Urinary Catheter:269754131}   Colostomy/Ileostomy/Ileal Conduit: {YES / EN:17353}       Date of Last BM: ***    Intake/Output Summary (Last 24 hours) at 2022 1102  Last data filed at 2022 0934  Gross per 24 hour   Intake --   Output 675 ml   Net -675 ml     I/O last 3 completed shifts:  In: -   Out: 400 [Urine:400]    Safety Concerns:     [address] MAY Safety Concerns:626687458}    Impairments/Disabilities:      [address] OLVERA Impairments/Disabilities:505883910}    Nutrition Therapy:  Current Nutrition Therapy:   [address] MAY Diet List:907609601}    Routes of Feeding: {[facility] Other Feedings:090345465}  Liquids: {Slp liquid thickness:85615}  Daily Fluid Restriction: {CHP DME Yes amt example:241307998}  Last Modified Barium Swallow with Video (Video Swallowing Test): {Done Not Done RNGJ:213533573}    Treatments at the Time of Hospital Discharge:   Respiratory Treatments: ***  Oxygen Therapy:  {Therapy; copd oxygen:49483}  Ventilator:    { CC Vent XESV:037672206}    Rehab Therapies: Physical Therapy and Occupational Therapy  Weight Bearing Status/Restrictions: Other Medical Equipment (for information only, NOT a DME order): Other Treatments: skilled nursing assessment per protocol medication education     Patient's personal belongings (please select all that are sent with patient):  {CHP DME Belongings:434627503}    RN SIGNATURE:  {Esignature:100524931}    CASE MANAGEMENT/SOCIAL WORK SECTION    Inpatient Status Date: 7/25/22    Readmission Risk Assessment Score:  Readmission Risk              Risk of Unplanned Readmission:  0           Discharging to [facility] [address], [address]  P: [phone]   F: [phone]      Dialysis Facility (if applicable)   Name:  Address:  Dialysis Schedule:  Phone:  Fax:    / signature: Electronically signed by Yoav Alvarez RN on 7/26/22 at 11:03 AM EDT    PHYSICIAN SECTION    Prognosis: Good    Condition at Discharge: Stable    Rehab Potential (if transferring to Rehab): Good    Recommended Labs or Other Treatments After Discharge:     Physician Certification: I certify the above information and transfer of Wandra Aase  is necessary for the continuing treatment of the diagnosis listed and that she requires Home Care for greater 30 days.      Update Admission H&P: No change in H&P    PHYSICIAN SIGNATURE:  Electronically signed by Nigel Brantley MD on 7/27/22 at 11:01 AM EDT

## 2022-07-26 NOTE — ACP (ADVANCE CARE PLANNING)
Advance Care Planning     Advance Care Planning Activator (Inpatient)  Conversation Note      Date of ACP Conversation: 7/26/2022     Conversation Conducted with: Patient with Decision Making Capacity    ACP Activator: Aisha Esquivel RN        Health Care Decision Maker:     Current Designated Health Care Decision Maker:     Primary Decision Maker: Brielle Rom *[facility] - [phone]  Click here to complete Healthcare Decision Makers including section of the Healthcare Decision Maker Relationship (ie \"Primary\")      Care Preferences    Ventilation: \"If you were in your present state of health and suddenly became very ill and were unable to breathe on your own, what would your preference be about the use of a ventilator (breathing machine) if it were available to you? \"      Would the patient desire the use of ventilator (breathing machine)?: yes    \"If your health worsens and it becomes clear that your chance of recovery is unlikely, what would your preference be about the use of a ventilator (breathing machine) if it were available to you? \"     Would the patient desire the use of ventilator (breathing machine)?: Yes      Resuscitation  \"CPR works best to restart the heart when there is a sudden event, like a heart attack, in someone who is otherwise healthy. Unfortunately, CPR does not typically restart the heart for people who have serious health conditions or who are very sick. \"    \"In the event your heart stopped as a result of an underlying serious health condition, would you want attempts to be made to restart your heart (answer \"yes\" for attempt to resuscitate) or would you prefer a natural death (answer \"no\" for do not attempt to resuscitate)? \" yes       [] Yes   [] No   Educated Patient / Jaydon Ruiz regarding differences between Advance Directives and portable DNR orders.     Length of ACP Conversation in minutes:      Conversation Outcomes:  [x] ACP discussion completed  [] Existing advance directive reviewed with patient; no changes to patient's previously recorded wishes  [] New Advance Directive completed  [] Portable Do Not Rescitate prepared for Provider review and signature  [] POLST/POST/MOLST/MOST prepared for Provider review and signature      Follow-up plan:    [] Schedule follow-up conversation to continue planning  [] Referred individual to Provider for additional questions/concerns   [] Advised patient/agent/surrogate to review completed ACP document and update if needed with changes in condition, patient preferences or care setting    [x] This note routed to one or more involved healthcare providers

## 2022-07-26 NOTE — PROGRESS NOTES
PT arrived to unit. Bed in lowest position wheels locked and call light within reach. Pt oriented to room.

## 2022-07-26 NOTE — PROGRESS NOTES
without mention of complication, not stated as uncontrolled, UTI (lower urinary tract infection), and Varicose vein. Past Surgical History:   has a past surgical history that includes Cholecystectomy (2007); Carpal tunnel release (Right); Tympanoplasty; Dilation & curettage (1979); Cystocopy (9/25/2013); Cataract removal with implant (Bilateral); Tonsillectomy; Incontinence surgery; ablation of dysrhythmic focus (2000); Upper gastrointestinal endoscopy (03/2016); eye surgery; Tubal ligation; other surgical history (09/10/15); Insertable Cardiac Monitor (12/04/2015); Tympanoplasty; Colonoscopy; Colonoscopy (04/10/2017); pr colsc flx w/removal lesion by hot bx forceps (N/A, 4/10/2017); Tympanostomy tube placement (08/21/2017); Upper gastrointestinal endoscopy (N/A, 3/2/2021); and Colonoscopy (N/A, 3/2/2021). Restrictions  Restrictions/Precautions  Restrictions/Precautions: Fall Risk, General Precautions  Required Braces or Orthoses?: No  Implants present? : Metal implants (Loop recorder)      Vitals  Vitals  Heart Rate: 79  Heart Rate Source: Monitor  BP: 118/66  BP Location: Right lower arm  MAP (Calculated): 83.33  Resp: 17  SpO2: 94 %  O2 Device: Nasal cannula     Subjective  Subjective: \"I don't know what I can do. I've been feeling woozy\"  Comments: Okay for OT PT evaluation and treat per RN Raleigh Arboleda. Objective  Cognition  Cognition  Overall Cognitive Status: WFL   Sensation  Overall Sensation Status: WFL (denied)    Activities of Daily Living  ADL  Feeding: Setup  Grooming: Setup  UE Bathing: Supervision  LE Bathing: Minimal assistance  UE Dressing: Supervision  LE Dressing: Moderate assistance  Toileting: Minimal assistance  Additional Comments: ADL scores based on skilled observation and clinical reasoning, unless otherwise noted. Pt able to doff L sock, placing LE on bed. However, she requested assist to doff R sock and nenita B socks.  Pt informed OT that she completes foot level care on the toilet, which sits lower than the hospital bed. Pt is currently limited due to shortness of breath, generalized weakness, and limited mobility impacting her independence with self care. Gross Assessment  AROM: Within functional limits  Strength: Within functional limits  Coordination: Within functional limits  Tone: Normal  Sensation: Intact    UE Function  LUE AROM (degrees)  LUE AROM : WFL  Left Hand AROM (degrees)  Left Hand AROM: WFL  Tone LUE  LUE Tone: Normotonic  LUE Strength  L Hand General: 4-/5  LUE Strength Comment: Overall 4-/5    RUE AROM (degrees)  RUE AROM : WFL  Right Hand AROM (degrees)  Right Hand AROM: WFL  Tone RUE  RUE Tone: Normotonic  RUE Strength  R Hand General: 4-/5  RUE Strength Comment: Overall 4-/5         Fine Motor Skills/Coordination  Coordination  Movements Are Fluid And Coordinated: Yes                Mobility  Bed Mobility  Bed mobility  Supine to Sit: Minimal assistance (Minimal assist for trunk)  Sit to Supine: Unable to assess  Scooting: Stand by assistance  Bed Mobility Comments: HOB elevated with use of handrails. No complaints of lightheadedness/dizziness upon sitting EOB. Balance  Balance  Sitting Balance: Independent  Standing Balance: Contact guard assistance  Standing Balance  Time: Approx.  one minute  Activity: functional transfers, functional mobility  Comment: with RW for UE support    Transfers  Transfers  Sit to stand: Contact guard assistance  Stand to sit: Contact guard assistance  Transfer Comments: Min cues for hand placement for safety    Functional Mobility  Functional - Mobility Device: Rolling Walker  Activity: Other (From bed around to chair)  Assist Level: Contact guard assistance  Functional Mobility Comments: Assist with line mgmt    Assessment  Assessment  Performance deficits / Impairments: Decreased functional mobility , Decreased ADL status, Decreased strength, Decreased endurance, Decreased balance, Decreased high-level IADLs  Treatment Diagnosis: Impaired self-care status  Prognosis: Good  Decision Making: Medium Complexity  Discharge Recommendations: Home with assist PRN, Home with Home health OT    Activity Tolerance  Activity Tolerance: Patient Tolerated treatment well    Safety Devices  Type of Devices:  All fall risk precautions in place, Call light within reach, Gait belt, Patient at risk for falls, Left in chair, Nurse notified    Patient Education  Patient Education  Education Given To: Patient  Education Provided: Role of Therapy, Plan of Care  Education Method: Verbal  Barriers to Learning: None  Education Outcome: Verbalized understanding, Continued education needed      Functional Outcome Measures  AM-PAC Daily Activity Inpatient   How much help for putting on and taking off regular lower body clothing?: A Lot  How much help for Bathing?: A Lot  How much help for Toileting?: A Lot  How much help for putting on and taking off regular upper body clothing?: A Little  How much help for taking care of personal grooming?: A Little  How much help for eating meals?: A Little  AM-[facility] Inpatient Daily Activity Raw Score: 15  AM-PAC Inpatient ADL T-Scale Score : 34.69  ADL Inpatient CMS 0-100% Score: 56.46  ADL Inpatient CMS G-Code Modifier : CK       Goals     Short Term Goals  Time Frame for Short term goals: By discharge  Short Term Goal 1: Pt will perform BADLs mod I and Good safety  Short Term Goal 2: Pt will V/D use of appropriate adaptive equipment/adaptive strategies to increase independence with self care tasks, 100% of the time  Short Term Goal 3: Pt will perform transfers/functional mobility mod I, using least restrictive device, during self care tasks  Short Term Goal 4: Pt will tolerate standing 10+ minutes, UE support as needed, mod I during self care/functional activities  Short Term Goal 5: Pt will V/D use of EC/WS techniques that are applicable to her daily routine  Short Term Goal 6: Pt will actively participate in 15+ minutes of therapeutic exercise/functional activity to promote safety and independence with self care and mobility    Plan  Plan  Times per Week: 4-5  Current Treatment Recommendations: Strengthening, Balance training, Functional mobility training, Endurance training, Safety education & training, Patient/Caregiver education & training, Equipment evaluation, education, & procurement, Self-Care / ADL      OT Individual Minutes  OT Individual Minutes  Time In: 8171  Time Out: 1843  Minutes: 27  Time Code Minutes   Timed Code Treatment Minutes: 10 Minutes        Electronically signed by Kaylan Hanks OT on 7/26/22 at 1:27 PM EDT

## 2022-07-26 NOTE — H&P
[address]     HISTORY AND PHYSICAL EXAMINATION            Date:   7/25/2022  Patientname:  Sabi Gan  Date of admission:  7/25/2022  4:31 PM  MRN:   [id number]  Account:  [de-identified]  YOB: 1950  PCP:    VERONIKA Molina CNP  Room:   10/10  Code Status:    Prior    CHIEF COMPLAINT     Chief Complaint   Patient presents with    Dizziness     Was at physical therapy today, felt dizzy. Elevated BS       HISTORY OF PRESENT ILLNESS  (Character, Onset, Location, Duration,  Exacerbating/RelievingFactors, Radiation,   Associated Symptoms, Severity )      The patient is a 66 y.o. female, with a history of AFIB-, CKD-3, CHF, COPD, DM HTN, CAD, HIGINIO who presents with concerns of hyperglycemia with blood sugars into the 500's, left lateral chest pain. Patient states that she has been having increased blood sugars over the past few days and today the blood sugars have been elevated into the 500's and she was taking her insulin without significant decrease in glucose. Patient admits to associated left chest pain shortness of breath, subjective low grade fevers and chills. Further questioning, the patient located the chest pain to the left lateral chest wall under the axilla. The pain is worse with respiration, but baseline constant, there is no radiation, there is associated  shortness of breath and nausea. Patient admits to increase use of her home oxygen from as needed to nearly all day, and increase uses of nebulizer. Admits to dry NP cough.   .       Work up in the emergency room       Laboratories:   Metabolic panel: Sodium 793, potassium 4.0, chloride 91, CO2 31, BUN 21, creatinine 1.08, museum 1.5, glucose 241  Cardiac Markers: High sensitive troponin 15 with a repeat of 12  Liver profile: ALT 39, AST 37, lipase 64  Hematology: No acute leukocytosis or anemias  Urine-large glucose otherwise within normal limits      Imaging and Diagnostics:   EKG (as documented in ED note):          CT ABDOMEN PELVIS WO CONTRAST Additional Contrast? None    Result Date: 7/25/2022    1. No acute intra-abdominal abnormality. 2. Faint ground-glass opacity in the left lower lobe, likely infectious or inflammatory. 3. 8 mm right middle lobe nodule is unchanged, suggestive of benignity. 4. Nonobstructing right nephrolithiasis. CT HEAD WO CONTRAST    Result Date: 7/25/2022  No acute intracranial abnormality. PAST MEDICAL HISTORY   Patient  has a past medical history of Abdominal bloating, Adenomatous polyp of ascending colon, Allergic rhinitis, Anxiety, Arthritis, Asthma, Atrial fibrillation ([address] Back pain, Benign hypertension with CKD (chronic kidney disease) stage III ([address] CAD (coronary artery disease), Caffeine use, CHF (congestive heart failure) ([address] Chronic kidney disease, CKD (chronic kidney disease) stage 3, GFR 30-59 ml/min ([facility]), COPD (chronic obstructive pulmonary disease) ([address] Degeneration of lumbar or lumbosacral intervertebral disc, Depression, DM (diabetes mellitus) ([address] Emphysema of lung ([address] Gastritis, GERD (gastroesophageal reflux disease), Hearing loss, Hematuria, Hiatal hernia, HTN (hypertension), benign, Hypertriglyceridemia, Incontinence of urine, Irritable bowel syndrome with both constipation and diarrhea, Knee arthropathy, Lumbosacral spondylosis without myelopathy, Migraine headache, Mumps, Neuropathy, Obesity, On home oxygen therapy, HIGINIO on CPAP, Otitis media, Secondary diabetes mellitus with stage 3 chronic kidney disease (GFR 30-59) ([address] Stroke ([address] Tinnitus, Type 2 diabetes mellitus without complication ([address] Type II or unspecified type diabetes mellitus without mention of complication, not stated as uncontrolled, UTI (lower urinary tract infection), and Varicose vein. PAST SURGICAL HISTORY    Patient  has a past surgical history that includes Cholecystectomy (2007); Carpal tunnel release (Right);  Tympanoplasty; Dilation & curettage (1979); Cystocopy (9/25/2013); Cataract removal with implant (Bilateral); Tonsillectomy; Incontinence surgery; ablation of dysrhythmic focus (2000); Upper gastrointestinal endoscopy (03/2016); eye surgery; Tubal ligation; other surgical history (09/10/15); Insertable Cardiac Monitor (12/04/2015); Tympanoplasty; Colonoscopy; Colonoscopy (04/10/2017); pr colsc flx w/removal lesion by hot bx forceps (N/A, 4/10/2017); Tympanostomy tube placement (08/21/2017); Upper gastrointestinal endoscopy (N/A, 3/2/2021); and Colonoscopy (N/A, 3/2/2021). FAMILY HISTORY    Patient family history includes Diabetes in her maternal grandmother and mother; Emphysema in her sister; Heart Disease in her mother; Stomach Cancer in her father. SOCIAL HISTORY    Patient  reports that she quit smoking about 22 years ago. Her smoking use included cigarettes. She has a 123.00 pack-year smoking history. She has never used smokeless tobacco. She reports that she does not drink alcohol and does not use drugs. HOME MEDICATIONS        Prior to Admission medications    Medication Sig Start Date End Date Taking? Authorizing Provider    MG capsule TAKE 1 CAPSULE BY MOUTH TWICE A DAY 7/18/22   VERONIKA Higigns CNP   SM ASPIRIN ADULT LOW STRENGTH 81 MG EC tablet TAKE 1 TABLET BY MOUTH ONCE DAILY 7/18/22   VERONIKA Higgins CNP   pantoprazole (PROTONIX) 40 MG tablet TAKE 1 TABLET BY MOUTH TWICE A DAY 7/18/22   VERONIKA Higgins CNP   isosorbide dinitrate (ISORDIL) 30 MG tablet TAKE 1 TABLET BY MOUTH ONCE DAILY 7/18/22   VERONIKA Higgins CNP   oxyCODONE-acetaminophen (PERCOCET) 5-325 MG per tablet Take 1 tablet by mouth every 8 hours as needed for Pain for up to 30 days. Intended supply: 30 days.  7/13/22 8/12/22  VERONIKA Anderson CNP   citalopram (CELEXA) 20 MG tablet  6/20/22   Historical Provider, MD   escitalopram (LEXAPRO) 10 MG tablet Take 1 tablet by mouth daily 7/8/22 10/6/22  Brooke Brandon VERONIKA Solano CNP   Continuous Blood Gluc  (FREESTYLE SHIVA 14 DAY READER) HARMEET 1 Units by Does not apply route 4 times daily 7/8/22 8/7/22  VERONIKA Evans CNP   Dulaglutide (TRULICITY) 1.5 FJ/3.2IF SOPN Inject 1.5 mg into the skin once a week 7/8/22   VERONIKA Evans CNP   ULTICARE MINI PEN NEEDLES 31G X 6 MM MISC USE AS DIRECTED 6/22/22   VERONIKA Snowden CNP   FEROSUL 325 (65 Fe) MG tablet TAKE 1 TAB BY MOUTH IN THE MORNING 6/20/22   VERONIKA Snowden CNP   clopidogrel (PLAVIX) 75 MG tablet TAKE 1 TAB BY MOUTH ONCE A DAY ( IN THE MORNING ) -THIS MEDICINE MAY BE TAKENWITH OR WITHOUT FOOD 5/25/22   VERONIKA Snowden CNP   omeprazole (PRILOSEC) 40 MG delayed release capsule Take 1 capsule by mouth every morning (before breakfast) 5/10/22   VERONIKA Snowden CNP   furosemide (LASIX) 20 MG tablet TAKE 1 TABLET BY MOUTH ONCE DAILY AS NEEDED 5/10/22   VERONIKA Snowden CNP   ranolazine (RANEXA) 500 MG extended release tablet Take 1 tablet by mouth 2 times daily for 14 days 5/1/22 7/8/22  Travon Blanchard,    ONETOUCH ULTRA strip TEST TWO (2) TO THREE (3) TIMES A DAY & AS NEEDED FOR SYMPTOMS OF IRREGULAR BLOOD GLUCOSE 4/11/22   Soraya Tom MD   oxybutynin (DITROPAN-XL) 5 MG extended release tablet TAKE 1 TABLET BY MOUTH DAILY 3/30/22   Soraya Tom MD   fenofibrate (TRIGLIDE) 160 MG tablet TAKE 1 TABLET BY MOUTH DAILY 3/30/22   Soraya Tom MD   insulin aspart (NOVOLOG FLEXPEN) 100 UNIT/ML injection pen IF<139 NO INSULIN; 140-199-2 UN;200-249-4 UN;250-299-6 UN;300-349-8 UN;350-400=10 UN;ABOVE 400-12 UNIT(S) 3/30/22   Soraya Tom MD   magnesium oxide (MAG-OX) 400 MG tablet  2/21/22   Historical Provider, MD   insulin glargine (BASAGLAR KWIKPEN) 100 UNIT/ML injection pen Inject 55 Units into the skin 2 times daily 3/1/22   Soraya Tom MD   Continuous Blood Gluc Sensor (DEXCOM G6 SENSOR) MISC Use daily for checking blood sugars 3/1/22 Adenike Weaver MD   losartan (COZAAR) 25 MG tablet TAKE 1 TABLET BY MOUTH ONCE DAILY 2/21/22   Adenike Weaver MD   carvedilol (COREG) 3.125 MG tablet TAKE 1 TAB BY MOUTH TWICE A DAY ( IN THE MORNING AND BEDTIME ) 1/21/22   Adenike Weaver MD   ketoconazole (NIZORAL) 2 % cream Apply topically  2 times a day.  12/29/21   Adenike Weaver MD   Multiple Vitamins-Minerals (CERTAVITE/ANTIOXIDANTS) TABS TAKE 1 TABLET BY MOUTH ONCE DAILY 12/8/21   Adenike Weaver MD   vitamin B-12 (CYANOCOBALAMIN) 100 MCG tablet take half a tablet BY MOUTH ONCE DAILY 11/24/21   Glenda Levi MD   Icosapent Ethyl (VASCEPA) 1 g CAPS capsule TAKE 1 CAPSULE BY MOUTH TWICE A DAY 11/24/21   Anaya Hill MD   dicyclomine (BENTYL) 10 MG capsule TAKE 1 CAPSULE BY MOUTH TWICE A DAY AS NEEDED FOR ABDOMINAL SPASMS 11/24/21   Glenda Levi MD   topiramate (TOPAMAX) 100 MG tablet TAKE 1 TABLET BY MOUTH NIGHTLY  10/25/21   Ara Merida MD   metFORMIN (GLUCOPHAGE) 1000 MG tablet TAKE 1 TAB BY MOUTH TWICE A DAY ( IN THE MORNING AND BEDTIME )  10/1/21   Adenike Weaver MD   blood glucose test strips (ONETOUCH ULTRA) strip TEST TWO (2) TO THREE (3) TIMES A DAY & AS NEEDED FOR SYMPTOMS OF IRREGULAR BLOOD GLUCOSE 8/23/21   Adenike Weaver MD   Blood Glucose Monitoring Suppl (ONE TOUCH ULTRA 2) w/Device KIT  2/10/21   Historical Provider, MD   atorvastatin (LIPITOR) 40 MG tablet Take 1 tablet by mouth daily 2/28/21   Adenike Weaver MD   BiPAP Machine MISC repair/replace Bipap maintain 18/9CMH2O, Cflex=3,mask,tubing,filters,headgear,heated humidifier,all supplies PRN 1/28/21   Historical Provider, MD   Psyllium (METAMUCIL PO) Take by mouth 3 times daily Takes powder    Historical Provider, MD   blood glucose test strips (TRUE METRIX BLOOD GLUCOSE TEST) strip THREE TIMES A DAY 1/21/21   Adenike Weaver MD   albuterol (PROVENTIL) (2.5 MG/3ML) 0.083% nebulizer solution Take 3 mLs by nebulization every 6 hours as needed for Wheezing 11/3/20   Gio Peters MD Dion   gabapentin (NEURONTIN) 300 MG capsule Take 1 capsule by mouth 3 times daily for 30 days. Patient taking differently: Take 300 mg by mouth 2 times daily. 7/30/20 7/8/22  VERONIKA Bhakta - CNP   nystatin (MYCOSTATIN) 717700 UNIT/GM powder Apply 3 times daily. 6/25/20   Itz Lamb MD   lidocaine (LMX) 4 % cream Apply topically every 8 hrs as needed for pain 11/11/19   Itz Lamb MD   Lift Chair MISC by Does not apply route 9/24/19   Itz Lamb MD   [facility] Devices (ADJUST BATH/SHOWER SEAT/BACK) MISC Use daily for shower 9/24/19   Itz Lamb MD   Alcohol Swabs (B-D SINGLE USE SWABS REGULAR) PADS THREE TIMES A DAY 12/12/18   Itz Lamb MD   nitroGLYCERIN (NITROSTAT) 0.4 MG SL tablet 1 under the tongue as needed for angina, may repeat q5mins for up three doses 8/28/18   Historical Provider, MD   Blood Glucose Monitoring Suppl HARMEET Use daily to check BS 3/27/18   Itz Lamb MD   ipratropium-albuterol (DUONEB) 0.5-2.5 (3) MG/3ML SOLN nebulizer solution Inhale 3 mLs into the lungs every 4 hours 2/6/18   Juliette Ortiz MD   Melatonin 10 MG TABS Take 10 mg by mouth nightly 10/19/17   Itz Lamb MD   Compression Stockings MISC by Does not apply route Pressure between 20 - 25 . 9/11/17   Itz Lamb MD   SYMBICORT 160-4.5 MCG/ACT AERO   2 puffs As needed for sob 5/28/15   Historical Provider, MD   OXYGEN Inhale 3 L into the lungs     Historical Provider, MD       ALLERGIES      Robitussin [guaifenesin], Codeine, Compazine [prochlorperazine maleate], Iodides, Morphine, Moxifloxacin, Reglan [metoclopramide], Avelox [moxifloxacin hcl in nacl], Cipro xr, Sulfa antibiotics, and Zofran [ondansetron hcl]    REVIEW OF SYSTEMS     Review of Systems   Constitutional:  Positive for appetite change (decreased.), chills, diaphoresis and fatigue. Negative for fever. HENT:  Negative for congestion and hearing loss.     Respiratory:  Positive for cough (dry np) and shortness of breath (few days , progressive in nature , home oxygen 2 liters as needed. has been using nebulizers and oxygen more frequently). Negative for wheezing and stridor. Cardiovascular:  Positive for chest pain (left lateral chest wall, non-radiant) and leg swelling. Negative for palpitations (chronic afib- blood thinner was removed by some provider-). Gastrointestinal:  Positive for abdominal pain (lower left quadrant- spasm/ cramp), constipation, nausea (few days) and vomiting (yellow, small amount x3). Negative for blood in stool. Genitourinary:  Positive for difficulty urinating, frequency and urgency. Negative for dysuria. Brown discoloration to urine    Musculoskeletal:  Positive for arthralgias. Negative for myalgias. Skin:  Negative for rash. Neurological:  Positive for dizziness (\"fuzziness\" ongoing for about a week.), weakness (bilateral now new) and headaches (this am, not any different than previous headaches). Negative for seizures. Psychiatric/Behavioral:  The patient is not nervous/anxious. PHYSICAL EXAM      BP (!) 151/75   Pulse 80   Temp 98 °F (36.7 °C) (Oral)   Resp 16   Ht 5' 2\" (1.575 m)   Wt 249 lb (112.9 kg)   LMP  (LMP Unknown)   SpO2 96%   BMI 45.54 kg/m²  Body mass index is 45.54 kg/m². Physical Exam  Vitals and nursing note reviewed. Constitutional:       General: She is not in acute distress. Appearance: She is well-developed. She is not diaphoretic. HENT:      Head: Normocephalic and atraumatic. Right Ear: Hearing normal.      Left Ear: Hearing normal.      Nose: Nose normal. No rhinorrhea. Eyes:      General: Lids are normal.      Extraocular Movements:      Right eye: Normal extraocular motion. Left eye: Normal extraocular motion. Conjunctiva/sclera: Conjunctivae normal.      Right eye: Right conjunctiva is not injected. Left eye: Left conjunctiva is not injected. Pupils: Pupils are equal, round, and reactive to light.  Pupils are equal.      Right eye: Pupil is reactive. Left eye: Pupil is reactive. Neck:      Thyroid: No thyromegaly. Vascular: No carotid bruit. Trachea: Trachea and phonation normal. No tracheal deviation. Cardiovascular:      Rate and Rhythm: Normal rate and regular rhythm. Pulses: Normal pulses. Heart sounds: Normal heart sounds. No murmur heard. Pulmonary:      Effort: Pulmonary effort is normal. No respiratory distress. Breath sounds: No stridor. Examination of the right-lower field reveals decreased breath sounds. Examination of the left-lower field reveals decreased breath sounds. Decreased breath sounds and wheezing (expiratory scattered t/o anterior) present. Abdominal:      General: Bowel sounds are normal. There is no distension. Palpations: Abdomen is soft. There is no mass. Tenderness: There is no abdominal tenderness. There is no guarding. Musculoskeletal:         General: No tenderness. Cervical back: Neck supple. Skin:     General: Skin is warm and dry. Findings: No erythema, lesion or rash. Neurological:      Mental Status: She is alert and oriented to person, place, and time. She is not disoriented. Cranial Nerves: No cranial nerve deficit. Psychiatric:         Speech: Speech normal.         Behavior: Behavior normal. Behavior is cooperative. DIAGNOSTICS          Labs:  CBC:   Recent Labs     07/25/22  1735   WBC 8.3   HGB 14.4        BMP:    Recent Labs     07/25/22  1735   *   K 4.0   CL 91*   CO2 31   BUN 21   CREATININE 1.08*   GLUCOSE 241*     S. Calcium:  Recent Labs     07/25/22  1735   CALCIUM 10.1     S. Ionized Calcium:No results for input(s): IONCA in the last 72 hours. S. Magnesium:  Recent Labs     07/25/22  1735   MG 1.5*     S. Phosphorus:No results for input(s): PHOS in the last 72 hours. S. Glucose:No results for input(s): POCGLU in the last 72 hours.   Glycosylated hemoglobin A1C:   Lab Results Component Value Date/Time    LABA1C 11.8 07/08/2022 08:29 AM     Hepatic:   Recent Labs     07/25/22  1735   AST 37*   ALT 39*   ALKPHOS 72     CARDIAC ENZY:   Recent Labs     07/25/22  1735   TROPHS 15*     INR: No results for input(s): INR in the last 72 hours. BNP: No results for input(s): PROBNP in the last 72 hours. ABGs: No results for input(s): PH, PCO2, PO2, HCO3, O2SAT in the last 72 hours. Lipids: No results for input(s): CHOL, TRIG, HDL, LDL, LDLCALC in the last 72 hours. Pancreatic functions:  Recent Labs     07/25/22  1735   LIPASE 64*     S. LacticAcid: No results for input(s): LACTA in the last 72 hours. Thyroid functions:   Lab Results   Component Value Date/Time    TSH 4.04 03/09/2022 10:14 AM      U/A:No results for input(s): NITRITE, COLORU, WBCUA, RBCUA, MUCUS, BACTERIA, CLARITYU, SPECGRAV, LEUKOCYTESUR, BLOODU, GLUCOSEU, AMORPHOUS in the last 72 hours. Invalid input(s): Ivette Mariscals  Recent Labs     07/25/22  1735   COVID19 Not Detected     Imaging/Diagonstics:     CT ABDOMEN PELVIS WO CONTRAST Additional Contrast? None    Result Date: 7/25/2022  EXAMINATION: CT OF THE ABDOMEN AND PELVIS WITHOUT CONTRAST 7/25/2022 6:59 pm TECHNIQUE: CT of the abdomen and pelvis was performed without the administration of intravenous contrast. Multiplanar reformatted images are provided for review. Automated exposure control, iterative reconstruction, and/or weight based adjustment of the mA/kV was utilized to reduce the radiation dose to as low as reasonably achievable. COMPARISON: 03/14/2019 HISTORY: ORDERING SYSTEM PROVIDED HISTORY: abd pain TECHNOLOGIST PROVIDED HISTORY: abd pain Decision Support Exception - unselect if not a suspected or confirmed emergency medical condition->Emergency Medical Condition (MA) Reason for Exam: abd pain Additional signs and symptoms: Pt c/o abdominal pain, GERD, nausea FINDINGS: Lower Chest: Patchy, ground-glass opacity in the left lower lobe.   8 mm noncalcified nodule in the right middle lobe, unchanged from prior study. Organs: Prior cholecystectomy. Within the limitations of a noncontrast examination, no acute abnormality within the liver, spleen, pancreas, or adrenal glands. Nonobstructing right nephrolithiasis. No obstructing stone or hydronephrosis. 4.1 cm simple appearing left renal cyst.  There is also a 4.6 cm simple appearing cyst in the lower pole of the left kidney. No further imaging follow-up is required. GI/Bowel: Stomach is partially distended. The small bowel is nondilated. The appendix is normal in caliber. The colon is nondilated. Pelvis: Bladder is partially distended without vesicular stone. The uterus is present. Peritoneum/Retroperitoneum: No ascites or pneumoperitoneum. Atherosclerosis in the nondilated abdominal aorta. Bones/Soft Tissues: Multilevel degenerative changes in the lumbar spine. 1. No acute intra-abdominal abnormality. 2. Faint ground-glass opacity in the left lower lobe, likely infectious or inflammatory. 3. 8 mm right middle lobe nodule is unchanged, suggestive of benignity. 4. Nonobstructing right nephrolithiasis. CT HEAD WO CONTRAST    Result Date: 7/25/2022  EXAMINATION: CT OF THE HEAD WITHOUT CONTRAST  7/25/2022 7:01 pm TECHNIQUE: CT of the head was performed without the administration of intravenous contrast. Automated exposure control, iterative reconstruction, and/or weight based adjustment of the mA/kV was utilized to reduce the radiation dose to as low as reasonably achievable. COMPARISON: 10/31/2019 HISTORY: ORDERING SYSTEM PROVIDED HISTORY: dizziness TECHNOLOGIST PROVIDED HISTORY: dizziness Decision Support Exception - unselect if not a suspected or confirmed emergency medical condition->Emergency Medical Condition (MA) Reason for Exam: dizziness FINDINGS: BRAIN/VENTRICLES: There is no acute intracranial hemorrhage, mass effect or midline shift. No abnormal extra-axial fluid collection.   The gray-white differentiation is maintained without evidence of an acute infarct. There is no evidence of hydrocephalus. ORBITS: The visualized portion of the orbits demonstrate no acute abnormality. SINUSES: The visualized paranasal sinuses and mastoid air cells demonstrate no acute abnormality. SOFT TISSUES/SKULL:  No acute abnormality of the visualized skull or soft tissues. No acute intracranial abnormality. 10/21  Transthoracic Echocardiography Report (TTE) CONCLUSIONS     Summary  Normal LV size and wall thickness. No obvious wall motion abnormality seen. Normal LV systolic function with LVEF >55%. Normal RV size and function. LA and RA appears normal in size. No obvious significant structural valvular abnormality noted. No significant valvular stenosis or regurgitation noted. Normal aortic root dimension. No significant pericardial effusion noted. IVC normal diameter and inspiratory variation indicating normal RA filling  pressure. ASSESSMENT  and  PLAN     Principal Problem:    Elevated troponin  Active Problems:    COPD (chronic obstructive pulmonary disease)    Left lower lobe pneumonia    Prolonged Q-T interval on ECG    Coronary artery disease due to lipid rich plaque    Essential hypertension    Obstructive sleep apnea syndrome    Secondary diabetes mellitus with stage 3 chronic kidney disease (GFR 30-59) ([facility])    CKD (chronic kidney disease) stage 3, GFR 30-59 ml/min  Resolved Problems:    * No resolved hospital problems.  *        Plan:    Typical Atypical Chest Pain  -Troponin  15, repeat normal  -EKG - SR with SA, normal rate and intervals, nonspecific changes unchanged compared to old ekg-Per ER  -Previous stress test Perfusion:  No significant perfusion defects at stress or rest.  Function:  Normal  Risk stratification: LOW   -Previous echocardiogram as above  -Pain/nausea control  -EKG PRN chest pain/arrhythmia      Pneumonia   -IV rocephin and azithromycin antibiotic initiated in ED  --continue rocephin on admission  -switch azithro to doxy given the QT lengthening on ECG   -Check for atypicals   -Sputum C&S pending  -Repeat CXR if no clinical improvement   -Pneumovax before discharge if applicable    COPD   -Likely exacerbated by the pneumonia  -IV Zithromax given in ER , but changed given QTC lengthening   -Respiratory care eval and treat  -Albuterol aerosols PRN  -Sputum C&S - pending specimen  -No need for steroids at this time wheezing is very slight faint and expiratory and given elevated glucose      HIGINIO on CPAP  -RT protocol for house cpap, if patient can not obtain hers     Hypomagnesemia  -Mag level - 1.5  -  2 gm IV replacement ordered  -Recheck BMP with mag at 2200   -monitor magnesium level daily  -replacement protocol ordered     Prolong QTC  -QT/QTC - 382/448 - HR 83   -Previous QT/ QTC- 350/ 390 ( 4/22) Rate 75  - switched azithro to doxy  - hold zofran  - avoid narcotics   -avoid other prolonging medications      Diabetes Mellitus  -Continue home dose insulin  -hold oral hypoglycemics/metformin for now  -POCT ac and hs with sliding scale coverage    CKD  -Patient with history of CKD Stage 3   -Creatinine: 1.08  ---Baseline: 1.3- 1.5, and prior to April 1.0    HTN  -Continue home BP meds  -Monitor VS    Dizziness  -History of A. Fib  -NOT ON anticoagulation (side of aspirin)  -If not improved IV hydration consider CTA or MRI          IVF: Normal saline at 50  Diet: Diabetic diet  GI ppx: Diet controlled  DVT ppx: Lovenox  Medication Reconciliation: Completed  Code status: Full code  Upcoming Procedure: None  Dispo: Observation    Consultations:     IP CONSULT TO INTERNAL MEDICINE  IP CONSULT TO [address] [place] [address], DNP, AGACNP, FNP-BC   7/25/2022  8:05 PM    Robby Pelletier [address] [place] [address], [address] [place] [address].    Phone [phone]

## 2022-07-26 NOTE — ED NOTES
A/O X 3   Skin warm and dry. Respirations quiet and easy. Lungs diminished t/o posteriorly. Using oxygen at 2.5 L/ min per nc. Heart sounds regular . Monitor shows NSR  Bowel sounds hypoactive x 4 quadrants, abdomen is soft, c/o mild nausea.  Peripheral pulses palpable, has 1+ edema in her lower legs     Petra Baker RN  07/25/22 [id number]

## 2022-07-26 NOTE — PLAN OF CARE
----- Message from Matthew Carr MD sent at 8/16/2021  8:53 AM EDT -----  Please let them know the #labs look good     Patient admitted for PNA and treatment thereof.  Will continue to monitor respiratory status and administer abx as Px.

## 2022-07-26 NOTE — PROGRESS NOTES
Medication History completed:    New medications: None    Medications discontinued: citalopram, dicyclomine, furosemide, gabapentin, vascepa, duoneb, ketoconazole, lidocaine, metalonin, omeprazole, psyllium, ranolazine, symbicort, vit B    Changes to dosing: None    Stated allergies: Patient can't recall, but stated \"the list on the computer was accurate\"    Other pertinent information: Confirmed the medication list with dispense report.      Thank you,  Minor De La Vega  PharmD candidate [id number]

## 2022-07-26 NOTE — CONSULTS
[facility]      Date of Admission:  7/25/2022    Date of Consultation:  7/26/2022    PCP:  VERONIKA Cruz CNP      REASON FOR CONSULT: Chest pain. HISTORY OF PRESENT ILLNESS:  Ulysses Brown is a 66 y.o. female  history of ASCAD,   60% small D1 disease on cardiac catheterization 2012, normal LV systolic function,  negative nuclear stress test with no stress-induced myocardial ischemia or infarction, history of PVCs and PACs, history of radiofrequency ablation, essential hypertension, hyperlipidemia, diabetes mellitus, history of TIA, COPD, morbid obesity who was just seen by Dr. Christy Horn in our office in March 2022 and I reviewed that note in the EHR. Patient presented to the emergency room with complains of bili pain and aching pains all over. About a week ago she had strong chest pain for which he took nitroglycerin and no severe chest pain since then. Denies any ongoing chest pain at the time of my evaluation. Apparently told that she had pneumonia after evaluation in the emergency room. We are asked to see for chest pain. Please refer to admitting history and physical for further details. Also please refer to my detailed office visit note dated 5/28/2021 for further details along with most recent office visit note by Dr. Nima Casanova in March 2022.     PMH:   has a past medical history of Abdominal bloating, Adenomatous polyp of ascending colon, Allergic rhinitis, Anxiety, Arthritis, Asthma, Atrial fibrillation (HCC), Back pain, Benign hypertension with CKD (chronic kidney disease) stage III ([address] CAD (coronary artery disease), Caffeine use, CHF (congestive heart failure) ([address] Chronic kidney disease, CKD (chronic kidney disease) stage 3, GFR 30-59 ml/min (HCC), COPD (chronic obstructive pulmonary disease) ([address] Degeneration of lumbar or lumbosacral intervertebral disc, Depression, DM (diabetes mellitus) ([address] Emphysema of lung ([facility] Gastritis, GERD (gastroesophageal reflux disease), Hearing loss, Hematuria, Hiatal hernia, HTN (hypertension), benign, Hypertriglyceridemia, Incontinence of urine, Irritable bowel syndrome with both constipation and diarrhea, Knee arthropathy, Lumbosacral spondylosis without myelopathy, Migraine headache, Mumps, Neuropathy, Obesity, On home oxygen therapy, HIGINIO on CPAP, Otitis media, Secondary diabetes mellitus with stage 3 chronic kidney disease (GFR 30-59) ([facility] Stroke ([facility] Tinnitus, Type 2 diabetes mellitus without complication ([facility] Type II or unspecified type diabetes mellitus without mention of complication, not stated as uncontrolled, UTI (lower urinary tract infection), and Varicose vein. PSH:   has a past surgical history that includes Cholecystectomy (2007); Carpal tunnel release (Right); Tympanoplasty; Dilation & curettage (1979); Cystocopy (9/25/2013); Cataract removal with implant (Bilateral); Tonsillectomy; Incontinence surgery; ablation of dysrhythmic focus (2000); Upper gastrointestinal endoscopy (03/2016); eye surgery; Tubal ligation; other surgical history (09/10/15); Insertable Cardiac Monitor (12/04/2015); Tympanoplasty; Colonoscopy; Colonoscopy (04/10/2017); pr colsc flx w/removal lesion by hot bx forceps (N/A, 4/10/2017); Tympanostomy tube placement (08/21/2017); Upper gastrointestinal endoscopy (N/A, 3/2/2021); and Colonoscopy (N/A, 3/2/2021). Allergies: Allergies   Allergen Reactions    Robitussin [Guaifenesin] Anaphylaxis    Codeine Swelling    Compazine [Prochlorperazine Maleate] Hives and Swelling    Iodides Itching    Morphine Other (See Comments)     hallucinations    Moxifloxacin      Other reaction(s): Intolerance-unknown  Other reaction(s):  Intolerance-unknown    Reglan [Metoclopramide] Nausea Only    Avelox [Moxifloxacin Hcl In Nacl] Rash     Dermatitis      Cipro Xr Rash     dermatitis    Sulfa Antibiotics Rash    Zofran [Ondansetron Hcl] Nausea And Vomiting        Home Meds:    Prior to Admission medications    Medication Sig Start Date End Date Taking? Authorizing Provider   metFORMIN (GLUCOPHAGE) 1000 MG tablet Take 1,000 mg by mouth in the morning and 1,000 mg in the evening. Take with meals. Yes Historical Provider, MD    MG capsule TAKE 1 CAPSULE BY MOUTH TWICE A DAY 7/18/22   VERONIKA Zaidi CNP   SM ASPIRIN ADULT LOW STRENGTH 81 MG EC tablet TAKE 1 TABLET BY MOUTH ONCE DAILY 7/18/22   VERONIKA Zaidi CNP   pantoprazole (PROTONIX) 40 MG tablet TAKE 1 TABLET BY MOUTH TWICE A DAY 7/18/22   VERONIKA Zaidi CNP   isosorbide dinitrate (ISORDIL) 30 MG tablet TAKE 1 TABLET BY MOUTH ONCE DAILY 7/18/22   VERONIKA Zaidi CNP   oxyCODONE-acetaminophen (PERCOCET) 5-325 MG per tablet Take 1 tablet by mouth every 8 hours as needed for Pain for up to 30 days. Intended supply: 30 days.  7/13/22 8/12/22  VERONIKA Wray CNP   escitalopram (LEXAPRO) 10 MG tablet Take 1 tablet by mouth daily 7/8/22 10/6/22  VERONIKA Zaidi CNP   Dulaglutide (TRULICITY) 1.5 XM/6.0FF SOPN Inject 1.5 mg into the skin once a week 7/8/22   VERONIKA Zaidi CNP   FEROSUL 325 (65 Fe) MG tablet TAKE 1 TAB BY MOUTH IN THE MORNING 6/20/22   VERONIKA Snowden CNP   clopidogrel (PLAVIX) 75 MG tablet TAKE 1 TAB BY MOUTH ONCE A DAY ( IN THE MORNING ) -THIS MEDICINE MAY BE TAKENWITH OR WITHOUT FOOD 5/25/22   VERONIKA Snowden CNP   oxybutynin (DITROPAN-XL) 5 MG extended release tablet TAKE 1 TABLET BY MOUTH DAILY 3/30/22   Carly Finn MD   fenofibrate (TRIGLIDE) 160 MG tablet TAKE 1 TABLET BY MOUTH DAILY 3/30/22   Carly Finn MD   insulin aspart (NOVOLOG FLEXPEN) 100 UNIT/ML injection pen IF<139 NO INSULIN; 140-199-2 UN;200-249-4 UI;393-766-2 UN;300-349-8 UN;350-400=10 UN;ABOVE 400-12 UNIT(S) 3/30/22   Carly Finn MD   magnesium oxide (MAG-OX) 400 MG tablet  2/21/22   Historical Provider, MD   insulin glargine [facility]) 100 UNIT/ML injection pen Inject 55 Units into the skin 2 times daily 3/1/22   Lizbeth Trevizo MD   losartan (COZAAR) 25 MG tablet TAKE 1 TABLET BY MOUTH ONCE DAILY 2/21/22   Lizbeth Trevizo MD   carvedilol (COREG) 3.125 MG tablet TAKE 1 TAB BY MOUTH TWICE A DAY ( IN THE MORNING AND BEDTIME ) 1/21/22   Lizbeth Trevizo MD   Multiple Vitamins-Minerals (CERTAVITE/ANTIOXIDANTS) TABS TAKE 1 TABLET BY MOUTH ONCE DAILY 12/8/21   Lizbeth Trevizo MD   topiramate (TOPAMAX) 100 MG tablet TAKE 1 TABLET BY MOUTH NIGHTLY  10/25/21   Bernarda Dixon MD   atorvastatin (LIPITOR) 40 MG tablet Take 1 tablet by mouth daily 2/28/21   Lizbeth Trevizo MD   albuterol (PROVENTIL) (2.5 MG/3ML) 0.083% nebulizer solution Take 3 mLs by nebulization every 6 hours as needed for Wheezing 11/3/20   Nakia Pringle MD   OXYGEN Inhale 3 L into the lungs     Historical Provider, MD        [facility] Meds:    Current Facility-Administered Medications   Medication Dose Route Frequency Provider Last Rate Last Admin    0.9 % sodium chloride infusion   IntraVENous PRN Ameya Neth, APRN - CNP        dextrose bolus 10% 125 mL  125 mL IntraVENous PRN Ameya Neth, APRN - CNP        Or    dextrose bolus 10% 250 mL  250 mL IntraVENous PRN Ameya Neth, APRN - CNP        dextrose 10 % infusion   IntraVENous Continuous PRN Ameya Neth, APRN - CNP        benzonatate (TESSALON) capsule 200 mg  200 mg Oral TID Ameya Neth, APRN - CNP   200 mg at 07/26/22 0840    0.9 % sodium chloride infusion   IntraVENous Continuous Ameya Neth, APRN - CNP 50 mL/hr at 07/26/22 0153 New Bag at 07/26/22 0153    atorvastatin (LIPITOR) tablet 40 mg  40 mg Oral Daily Natividad Smith MD   40 mg at 07/26/22 0840    carvedilol (COREG) tablet 6.25 mg  6.25 mg Oral BID Natividad Smith MD   6.25 mg at 07/26/22 0840    clopidogrel (PLAVIX) tablet 75 mg  75 mg Oral Daily Natividad Smith MD   75 mg at 07/26/22 0840    losartan (COZAAR) tablet 25 mg  25 mg Oral Daily Hattie Smith MD   25 mg at 07/26/22 0840    sodium chloride flush 0.9 % injection 5-40 mL  5-40 mL IntraVENous 2 times per day Jeremy Vernon APRN - CNP   10 mL at 07/26/22 0841    sodium chloride flush 0.9 % injection 10 mL  10 mL IntraVENous PRN Jeremy Vernon, APRN - CNP        potassium chloride (KLOR-CON M) extended release tablet 40 mEq  40 mEq Oral PRN Tommye Moots, APRN - CNP        Or    potassium bicarb-citric acid (EFFER-K) effervescent tablet 40 mEq  40 mEq Oral PRN Tommye Moots, APRN - CNP        Or    potassium chloride 10 mEq/100 mL IVPB (Peripheral Line)  10 mEq IntraVENous PRN Jeremy Mendozaots, APRN - CNP        magnesium sulfate 1000 mg in dextrose 5% 100 mL IVPB  1,000 mg IntraVENous PRN Jeremy Vernon, APRN - CNP        enoxaparin Sodium (LOVENOX) injection 30 mg  30 mg SubCUTAneous BID Jeremy Vernon APRN - CNP   30 mg at 07/26/22 0839    polyethylene glycol (GLYCOLAX) packet 17 g  17 g Oral Daily PRN Jeremy Vernon, APRN - CNP        acetaminophen (TYLENOL) tablet 650 mg  650 mg Oral Q6H PRN Jeremy Vernon, APRN - CNP   650 mg at 07/26/22 0147    Or    acetaminophen (TYLENOL) suppository 650 mg  650 mg Rectal Q6H PRN Jeremy Vernon, APRN - CNP        glucose chewable tablet 16 g  4 tablet Oral PRN Jeremy Vernon, APRN - CNP        glucagon (rDNA) injection 1 mg  1 mg IntraMUSCular PRN Jeremy Vernon, APRN - CNP        albuterol (PROVENTIL) nebulizer solution 2.5 mg  2.5 mg Nebulization Q4H PRN Jeremy Vernon, APRN - CNP        cefTRIAXone (ROCEPHIN) 1,000 mg in dextrose 5 % 50 mL IVPB mini-bag  1,000 mg IntraVENous Q24H Jeremy Vernon, APRN - CNP        doxycycline monohydrate (MONODOX) capsule 100 mg  100 mg Oral 2 times per day Jeremy Vernon APRN - CNP   100 mg at 07/26/22 0840    ondansetron (ZOFRAN) injection 8 mg  8 mg IntraVENous Once Dolores Gorman MD        melatonin tablet 3 mg  3 mg Oral Nightly PRN VERONIKA Bolaños - CNP   3 mg at 07/25/22 8059 Social History:    Social History     Socioeconomic History    Marital status: Legally    Occupational History    Occupation: disabled     Employer: N/A   Tobacco Use    Smoking status: Former     Packs/day: 3.00     Years: 41.00     Pack years: 123.00     Types: Cigarettes     Quit date: 2000     Years since quittin.5    Smokeless tobacco: Never    Tobacco comments:     quit in    Vaping Use    Vaping Use: Never used   Substance and Sexual Activity    Alcohol use: No     Alcohol/week: 0.0 standard drinks    Drug use: No    Sexual activity: Not Currently     Social Determinants of Health     Financial Resource Strain: Low Risk     Difficulty of Paying Living Expenses: Not hard at all   Food Insecurity: No Food Insecurity    Worried About Running Out of Food in the Last Year: Never true    Ran Out of Food in the Last Year: Never true       Family History:    Family History   Problem Relation Age of Onset    Diabetes Mother     Heart Disease Mother     Stomach Cancer Father     Diabetes Maternal Grandmother         also aunts and uncles (maternal)    Emphysema Sister        Review of Systems:      Constitutional: no weight loss or gain, no fever or chills. Eyes: No new visual changes. ENT: No new hearing loss. Cardiovascular: see HPI. Respiratory: No cough. No hemoptysis. Gastrointestinal: +abdominal pain, no blood in stools. Genitourinary: No hematuria or increased frequency. Musculoskeletal:  No new gait disturbance. Integumentary: No rash or pruritis. Neurological: No headache. No seizures or recent CVA/TIA. Psychiatric: +depression. Hematologic/Lymphatic: No abnormal bruising or bleeding. Allergic/Immunologic: No new allergies.        Physical Exam   Vital Signs: BP (!) 134/51   Pulse 86   Temp 97.3 °F (36.3 °C) (Oral)   Resp 18   Ht 5' 2\" (1.575 m)   Wt 243 lb 2.7 oz (110.3 kg)   LMP  (LMP Unknown)   SpO2 93%   BMI 44.48 kg/m²  O2 Flow Rate (L/min): 3 L/min Admission Weight: 249 lb (112.9 kg)     General appearance: Awake, Alert, well developed, well nourished, pleasant. Cooperative. Laying in bed comfortably. Head: Normocephalic, atraumatic. Neck: no JVD, no carotid bruits, no thyromegaly. Chest: Clear bilaterally. No chest wall tenderness. No wheezing. Cardiac: Regular rate and rhythm, no S3 or S4 gallop, no murmur or rubs or clicks. Abdomen: Soft, non-tender. Extremities: no cyanosis or clubbing or leg edema. No calf tenderness. Pulses:  Bilaterally symmetrical and intact radial and femoral pulses. Neurologic:  Able to move all 4 extremities. Skin:  No rashes. Warm and dry.       EKG:     Normal sinus rhythm with sinus arrhythmia   Left axis deviation   Nonspecific T wave abnormality   Abnormal ECG       Labs:      CBC:   Recent Labs     07/25/22  1735   WBC 8.3   HGB 14.4   HCT 42.7   MCV 91.2        BMP:   Recent Labs     07/25/22  1735 07/25/22  2358   * 133*   K 4.0 4.1   CL 91* 96*   CO2 31 27   BUN 21 17   CREATININE 1.08* 0.99*       MAG:   Recent Labs     07/25/22  1735 07/25/22  2358   MG 1.5* 1.9       Recent Results (from the past 24 hour(s))   EKG 12 Lead    Collection Time: 07/25/22  5:17 PM   Result Value Ref Range    Ventricular Rate 83 BPM    Atrial Rate 83 BPM    P-R Interval 128 ms    QRS Duration 70 ms    Q-T Interval 382 ms    QTc Calculation (Bazett) 448 ms    P Axis 56 degrees    R Axis -35 degrees    T Axis 87 degrees   CBC with Auto Differential    Collection Time: 07/25/22  5:35 PM   Result Value Ref Range    WBC 8.3 3.5 - 11.0 k/uL    RBC 4.68 4.0 - 5.2 m/uL    Hemoglobin 14.4 12.0 - 16.0 g/dL    Hematocrit 42.7 36 - 46 %    MCV 91.2 80 - 100 fL    MCH 30.8 26 - 34 pg    MCHC 33.7 31 - 37 g/dL    RDW 13.7 11.5 - 14.9 %    Platelets 509 103 - 018 k/uL    MPV 9.3 6.0 - 12.0 fL    Seg Neutrophils 62 36 - 66 %    Lymphocytes 27 24 - 44 %    Monocytes 9 (H) 1 - 7 %    Eosinophils % 1 0 - 4 % Basophils 1 0 - 2 %    Segs Absolute 5.20 1.3 - 9.1 k/uL    Absolute Lymph # 2.20 1.0 - 4.8 k/uL    Absolute Mono # 0.80 0.1 - 1.3 k/uL    Absolute Eos # 0.10 0.0 - 0.4 k/uL    Basophils Absolute 0.10 0.0 - 0.2 k/uL   Comprehensive Metabolic Panel    Collection Time: 07/25/22  5:35 PM   Result Value Ref Range    Glucose 241 (H) 70 - 99 mg/dL    BUN 21 8 - 23 mg/dL    Creatinine 1.08 (H) 0.50 - 0.90 mg/dL    Calcium 10.1 8.6 - 10.4 mg/dL    Sodium 132 (L) 135 - 144 mmol/L    Potassium 4.0 3.7 - 5.3 mmol/L    Chloride 91 (L) 98 - 107 mmol/L    CO2 31 20 - 31 mmol/L    Anion Gap 10 9 - 17 mmol/L    Alkaline Phosphatase 72 35 - 104 U/L    ALT 39 (H) 5 - 33 U/L    AST 37 (H) <32 U/L    Total Bilirubin 0.32 0.3 - 1.2 mg/dL    Total Protein 7.5 6.4 - 8.3 g/dL    Albumin 4.3 3.5 - 5.2 g/dL    GFR Non- 50 (L) >60 mL/min    GFR African American >60 >60 mL/min    GFR Comment         Lipase    Collection Time: 07/25/22  5:35 PM   Result Value Ref Range    Lipase 64 (H) 13 - 60 U/L   Magnesium    Collection Time: 07/25/22  5:35 PM   Result Value Ref Range    Magnesium 1.5 (L) 1.6 - 2.6 mg/dL   Troponin    Collection Time: 07/25/22  5:35 PM   Result Value Ref Range    Troponin, High Sensitivity 15 (H) 0 - 14 ng/L   COVID-19 & Influenza Combo    Collection Time: 07/25/22  5:35 PM    Specimen: Nasopharyngeal Swab   Result Value Ref Range    Specimen Description . NASOPHARYNGEAL SWAB     Source . NASOPHARYNGEAL SWAB     SARS-CoV-2 RNA, RT PCR Not Detected Not Detected    INFLUENZA A Not Detected Not Detected    INFLUENZA B Not Detected Not Detected   Urinalysis with Reflex to Culture    Collection Time: 07/25/22  7:48 PM    Specimen: Urine, clean catch   Result Value Ref Range    Color, UA Yellow Yellow    Turbidity UA Clear Clear    Glucose, Ur LARGE (A) NEGATIVE    Bilirubin Urine NEGATIVE NEGATIVE    Ketones, Urine NEGATIVE NEGATIVE    Specific [place], UA 1.021 1.000 - 1.030    Urine Hgb NEGATIVE NEGATIVE    pH, UA 5.0 5.0 - 8.0    Protein, UA NEGATIVE NEGATIVE    Urobilinogen, Urine Normal Normal    Nitrite, Urine NEGATIVE NEGATIVE    Leukocyte Esterase, Urine NEGATIVE NEGATIVE    Urinalysis Comments       Microscopic exam not performed based on chemical results unless requested in original order. Legionella antigen, urine    Collection Time: 07/25/22  7:48 PM    Specimen: Urine, clean catch   Result Value Ref Range    Legionella Pneumophilia Ag, Urine NEGATIVE NEGATIVE   Strep Pneumoniae Antigen    Collection Time: 07/25/22  7:48 PM    Specimen: Urine, clean catch   Result Value Ref Range    Source . CLEAN CATCH URINE     Strep pneumo Ag NEGATIVE    Troponin    Collection Time: 07/25/22  9:39 PM   Result Value Ref Range    Troponin, High Sensitivity 12 0 - 14 ng/L   POCT Glucose    Collection Time: 07/25/22 11:50 PM   Result Value Ref Range    Glucose 291 mg/dL    QC OK? yes    POC Glucose Fingerstick    Collection Time: 07/25/22 11:50 PM   Result Value Ref Range    POC Glucose 291 (H) 65 - 105 mg/dL   Basic Metabolic Panel    Collection Time: 07/25/22 11:58 PM   Result Value Ref Range    Glucose 299 (H) 70 - 99 mg/dL    BUN 17 8 - 23 mg/dL    Creatinine 0.99 (H) 0.50 - 0.90 mg/dL    Calcium 8.7 8.6 - 10.4 mg/dL    Sodium 133 (L) 135 - 144 mmol/L    Potassium 4.1 3.7 - 5.3 mmol/L    Chloride 96 (L) 98 - 107 mmol/L    CO2 27 20 - 31 mmol/L    Anion Gap 10 9 - 17 mmol/L    GFR Non-African American 55 (L) >60 mL/min    GFR African American >60 >60 mL/min    GFR Comment         Magnesium    Collection Time: 07/25/22 11:58 PM   Result Value Ref Range    Magnesium 1.9 1.6 - 2.6 mg/dL   POC Glucose Fingerstick    Collection Time: 07/26/22 10:57 AM   Result Value Ref Range    POC Glucose 341 (H) 65 - 105 mg/dL         2 D ECHOCARDIOGRAM:    Ordered      IMPRESSION:      Chest pain of uncertain etiology. Ruled out for myocardial infarction with peak high sensitivity troponin only 15 and the 2nd level was 12.   Does not represent acute myocardial infarction or acute coronary syndrome. ASCAD. No stress-induced myocardial ischemia or infarction, LVEF 86%, low risk study on nuclear stress test February 2022. Abnormal EKG. Abdominal pain. Workup per admitting MD.      Normal LV systolic function. Essential hypertension. Hypercholesterolemia. Morbid obesity with BMI 44 range. Other problems as charted. REC/PLAN:    As ordered. Will continue on prior home cardiac medications including Coreg 6.25 mg b.I.d. with holding parameters, Plavix 75 mg daily, atorvastatin 40 mg daily, losartan 25 mg daily, nitroglycerin 0.4 mg sublingual p.r.n. chest pain, subcutaneous Lovenox for DVT prophylaxis, analgesics p.r.n., oxygen as needed, other supportive care as you are doing. A 2D echocardiogram was ordered to assess LVEF and rule out any wall motion abnormalities or other structural heart disease. No plans for any repeat stress test or cardiac catheterization or CT angiogram of coronary arteries in this patient with atypical noncardiac chest pain most likely and with the negative recent nuclear stress test in 2022. Suggest continued aggressive cardiac risk factor modification. Discussed with the hospitalist Dr. Twan Stubbs. Signing off. Advised patient to follow-up in our office with me in 4-6 weeks or sooner as needed. Thank you. Electronically signed by Eduardo Lima MD, [facility]      PLEASE NOTE:  This progress note was completed using a voice transcription system. Every effort was made to ensure accuracy. However, inadvertent computerized transcription errors may be present.

## 2022-07-26 NOTE — ED NOTES
Pur-wick external urinary catheter placed to low suction and draining clear, yellow urine     Belle Garcia RN  07/25/22 1499

## 2022-07-26 NOTE — ED NOTES
RESPIRATORY HERE AND PLACED PATIENT ON C-PAP OF [address] [facility], [facility]  07/26/22 5858

## 2022-07-26 NOTE — CARE COORDINATION
CASE MANAGEMENT NOTE:    Admission Date:  7/25/2022 Marianne Barthel is a 66 y.o.  female    Admitted for : Dizziness [R42]  Hypomagnesemia syndrome [E83.42]  Elevated troponin [R77.8]  Pneumonia of left lower lobe due to infectious organism [J18.9]    Met with:  Patient    PCP:     Dr. Hugh Flores:   Jovanny Patel       Is patient alert and oriented at time of discussion:  Yes    Current Residence/ Living Arrangements:  independently at home             Current Services PTA:  Yes    Does patient go to outpatient dialysis: No  If yes, location and chair time:     Is patient agreeable to VNS: Yes    Freedom of choice provided:  Yes    List of [address] provided: Yes    VNS chosen:  Yes    DME:  walker, shower chair, scooter, and other Bed     Home Oxygen: Yes    Nebulizer: Yes    CPAP/BIPAP: Yes    Supplier: [employer]    Potential Assistance Needed: No    SNF needed: No    Freedom of choice and list provided: NA    Pharmacy:  [facility]        Is patient currently receiving oral anticoagulation therapy? No    Is the Patient an [facility] with Readmission Risk Score greater than 14%? No  If yes, pt needs a follow up appointment made within 7 days. Family Members/Caregivers that pt would like involved in their care:    Yes    If yes, list name here:  Katlin Lay Provider:  Family             Discharge Plan:  7/26/22 Jovanny Patel Pt is from home in a 8 story apt building with elevator DME scooter, walker, shower chair and hospital bed VNS is agreeable will send referral to [facility] has Aids from [address] is to discharge to home with vns will follow for needs IMM done 7/26/22 @ [id number] .//tv                 Electronically signed by:  Alvin Villa RN on 7/26/2022 at 10:36 AM

## 2022-07-26 NOTE — TELEPHONE ENCOUNTER
[place] [facility] would like to know if you will follow pt for home  care after pt is released from the hospital. Contact tisha [phone]

## 2022-07-26 NOTE — H&P
[id number]     HISTORY AND PHYSICAL EXAMINATION            Date:   7/26/2022  Patientname:  Dee Viveros  Date of admission:  7/25/2022  4:31 PM  MRN:   [id number]  Account:  [de-identified]  YOB: 1950  PCP:    VERONIKA Larsen CNP  Room:   2114/2114-01  Code Status:    Full Code    CHIEF COMPLAINT     Chief Complaint   Patient presents with    Dizziness     Was at physical therapy today, felt dizzy. Elevated BS       HISTORY OF PRESENT ILLNESS  (Character, Onset, Location, Duration,  Exacerbating/RelievingFactors, Radiation,   Associated Symptoms, Severity )      The patient is a 66 y.o. female, with a history of AFIB-, CKD-3, CHF, COPD, DM HTN, CAD, HIGINIO who presents with concerns of hyperglycemia with blood sugars into the 500's, left lateral chest pain. Patient states that she has been having increased blood sugars over the past few days and today the blood sugars have been elevated into the 500's and she was taking her insulin without significant decrease in glucose. Patient admits to associated left chest pain shortness of breath, subjective low grade fevers and chills. Further questioning, the patient located the chest pain to the left lateral chest wall under the axilla. The pain is worse with respiration, but baseline constant, there is no radiation, there is associated  shortness of breath and nausea. Patient admits to increase use of her home oxygen from as needed to nearly all day, and increase uses of nebulizer. Admits to dry NP cough.   .       Work up in the emergency room       Laboratories:   Metabolic panel: Sodium 794, potassium 4.0, chloride 91, CO2 31, BUN 21, creatinine 1.08, museum 1.5, glucose 241  Cardiac Markers: High sensitive troponin 15 with a repeat of 12  Liver profile: ALT 39, AST 37, lipase 64  Hematology: No acute leukocytosis or anemias  Urine-large glucose otherwise within normal limits      Imaging and Diagnostics: Dilation & curettage (1979); Cystocopy (9/25/2013); Cataract removal with implant (Bilateral); Tonsillectomy; Incontinence surgery; ablation of dysrhythmic focus (2000); Upper gastrointestinal endoscopy (03/2016); eye surgery; Tubal ligation; other surgical history (09/10/15); Insertable Cardiac Monitor (12/04/2015); Tympanoplasty; Colonoscopy; Colonoscopy (04/10/2017); pr colsc flx w/removal lesion by hot bx forceps (N/A, 4/10/2017); Tympanostomy tube placement (08/21/2017); Upper gastrointestinal endoscopy (N/A, 3/2/2021); and Colonoscopy (N/A, 3/2/2021). FAMILY HISTORY    Patient family history includes Diabetes in her maternal grandmother and mother; Emphysema in her sister; Heart Disease in her mother; Stomach Cancer in her father. SOCIAL HISTORY    Patient  reports that she quit smoking about 22 years ago. Her smoking use included cigarettes. She has a 123.00 pack-year smoking history. She has never used smokeless tobacco. She reports that she does not drink alcohol and does not use drugs. HOME MEDICATIONS        Prior to Admission medications    Medication Sig Start Date End Date Taking? Authorizing Provider   metFORMIN (GLUCOPHAGE) 1000 MG tablet Take 1,000 mg by mouth in the morning and 1,000 mg in the evening. Take with meals. Yes Historical Provider, MD    MG capsule TAKE 1 CAPSULE BY MOUTH TWICE A DAY 7/18/22   VERONIKA Valdez CNP    ASPIRIN ADULT LOW STRENGTH 81 MG EC tablet TAKE 1 TABLET BY MOUTH ONCE DAILY 7/18/22   VERONIKA Valdez CNP   pantoprazole (PROTONIX) 40 MG tablet TAKE 1 TABLET BY MOUTH TWICE A DAY 7/18/22   VERONIKA Valdez CNP   isosorbide dinitrate (ISORDIL) 30 MG tablet TAKE 1 TABLET BY MOUTH ONCE DAILY 7/18/22   VERONIKA Valdez CNP   oxyCODONE-acetaminophen (PERCOCET) 5-325 MG per tablet Take 1 tablet by mouth every 8 hours as needed for Pain for up to 30 days. Intended supply: 30 days.  7/13/22 8/12/22  VERONIKA Montoya CNP   escitalopram (LEXAPRO) 10 MG tablet Take 1 tablet by mouth daily 7/8/22 10/6/22  VERONIKA Gordon - CNP   Dulaglutide (TRULICITY) 1.5 TR/8.6XG SOPN Inject 1.5 mg into the skin once a week 7/8/22   VERONIKA Gordon - CNP   FEROSUL 325 (65 Fe) MG tablet TAKE 1 TAB BY MOUTH IN THE MORNING 6/20/22   VERONIKA Snowden - CNP   clopidogrel (PLAVIX) 75 MG tablet TAKE 1 TAB BY MOUTH ONCE A DAY ( IN THE MORNING ) -THIS MEDICINE MAY BE TAKENWITH OR WITHOUT FOOD 5/25/22   VEORNIKA Snowden - CNP   oxybutynin (DITROPAN-XL) 5 MG extended release tablet TAKE 1 TABLET BY MOUTH DAILY 3/30/22   Boone Hargrove MD   fenofibrate (TRIGLIDE) 160 MG tablet TAKE 1 TABLET BY MOUTH DAILY 3/30/22   Boone Hargrove MD   insulin aspart (NOVOLOG FLEXPEN) 100 UNIT/ML injection pen IF<139 NO INSULIN; 140-199-2 UN;200-249-4 UN;250-299-6 GK;185-333-8 UN;350-400=10 UN;ABOVE 400-12 UNIT(S) 3/30/22   Boone Hargrove MD   magnesium oxide (MAG-OX) 400 MG tablet  2/21/22   Nick Mcknight MD   insulin glargine (BASAGLAR KWIKPEN) 100 UNIT/ML injection pen Inject 55 Units into the skin 2 times daily 3/1/22   Boone Hargrove MD   losartan (COZAAR) 25 MG tablet TAKE 1 TABLET BY MOUTH ONCE DAILY 2/21/22   Boone Hargrove MD   carvedilol (COREG) 3.125 MG tablet TAKE 1 TAB BY MOUTH TWICE A DAY ( IN THE MORNING AND BEDTIME ) 1/21/22   Boone Hargrove MD   Multiple Vitamins-Minerals (CERTAVITE/ANTIOXIDANTS) TABS TAKE 1 TABLET BY MOUTH ONCE DAILY 12/8/21   Boone Hargrove MD   topiramate (TOPAMAX) 100 MG tablet TAKE 1 TABLET BY MOUTH NIGHTLY  10/25/21   Sidney Flannery MD   atorvastatin (LIPITOR) 40 MG tablet Take 1 tablet by mouth daily 2/28/21   Boone Hargrove MD   albuterol (PROVENTIL) (2.5 MG/3ML) 0.083% nebulizer solution Take 3 mLs by nebulization every 6 hours as needed for Wheezing 11/3/20   Sandie Yoder MD   OXYGEN Inhale 3 L into the lungs     Historical Provider, MD       ALLERGIES      Robitussin [guaifenesin], Codeine, Compazine [prochlorperazine maleate], Iodides, Morphine, Moxifloxacin, Reglan [metoclopramide], Avelox [moxifloxacin hcl in nacl], Cipro xr, Sulfa antibiotics, and Zofran [ondansetron hcl]    REVIEW OF SYSTEMS     Review of Systems   Constitutional:  Positive for appetite change (decreased.), chills, diaphoresis and fatigue. Negative for fever. HENT:  Negative for congestion and hearing loss. Respiratory:  Positive for cough (dry np) and shortness of breath (few days , progressive in nature , home oxygen 2 liters as needed. has been using nebulizers and oxygen more frequently). Negative for wheezing and stridor. Cardiovascular:  Positive for chest pain (left lateral chest wall, non-radiant) and leg swelling (improves with rest and elevation). Negative for palpitations (chronic afib- blood thinner was removed by some provider-). Gastrointestinal:  Positive for abdominal pain (lower left quadrant- spasm/ cramp), constipation, nausea (few days) and vomiting (yellow, small amount x3). Negative for blood in stool. Genitourinary:  Positive for difficulty urinating, frequency and urgency. Negative for dysuria. Brown discoloration to urine    Musculoskeletal:  Positive for arthralgias. Negative for myalgias. Skin:  Negative for rash. Neurological:  Positive for dizziness (\"fuzziness\" ongoing for about a week.), weakness (bilateral now new) and headaches (this am, not any different than previous headaches). Negative for seizures. Psychiatric/Behavioral:  The patient is not nervous/anxious. PHYSICAL EXAM      BP (!) 134/51   Pulse 73   Temp 97.3 °F (36.3 °C) (Oral)   Resp 18   Ht 5' 2\" (1.575 m)   Wt 243 lb 2.7 oz (110.3 kg)   LMP  (LMP Unknown)   SpO2 92%   BMI 44.48 kg/m²  Body mass index is 44.48 kg/m². Physical Exam  Vitals and nursing note reviewed. Constitutional:       General: She is not in acute distress. Appearance: She is well-developed. She is not diaphoretic.    HENT: Head: Normocephalic and atraumatic. Right Ear: Hearing normal.      Left Ear: Hearing normal.      Nose: Nose normal. No rhinorrhea. Eyes:      General: Lids are normal.      Extraocular Movements:      Right eye: Normal extraocular motion. Left eye: Normal extraocular motion. Conjunctiva/sclera: Conjunctivae normal.      Right eye: Right conjunctiva is not injected. Left eye: Left conjunctiva is not injected. Pupils: Pupils are equal, round, and reactive to light. Pupils are equal.      Right eye: Pupil is reactive. Left eye: Pupil is reactive. Neck:      Thyroid: No thyromegaly. Vascular: No carotid bruit. Trachea: Trachea and phonation normal. No tracheal deviation. Cardiovascular:      Rate and Rhythm: Normal rate and regular rhythm. Pulses: Normal pulses. Heart sounds: Normal heart sounds. No murmur heard. Pulmonary:      Effort: Pulmonary effort is normal. No respiratory distress. Breath sounds: No stridor. Examination of the right-lower field reveals decreased breath sounds. Examination of the left-lower field reveals decreased breath sounds. Decreased breath sounds and wheezing (expiratory scattered t/o anterior) present. Abdominal:      General: Bowel sounds are normal. There is no distension. Palpations: Abdomen is soft. There is no mass. Tenderness: There is no abdominal tenderness. There is no guarding. Musculoskeletal:         General: No tenderness. Cervical back: Neck supple. Skin:     General: Skin is warm and dry. Findings: No erythema, lesion or rash. Neurological:      Mental Status: She is alert and oriented to person, place, and time. She is not disoriented. Cranial Nerves: No cranial nerve deficit. Psychiatric:         Speech: Speech normal.         Behavior: Behavior normal. Behavior is cooperative.      DIAGNOSTICS          Labs:  CBC:   Recent Labs     07/25/22  1735   WBC 8.3   HGB 14.4        BMP:    Recent Labs     07/25/22  1735 07/25/22  2350 07/25/22  2358   *  --  133*   K 4.0  --  4.1   CL 91*  --  96*   CO2 31  --  27   BUN 21  --  17   CREATININE 1.08*  --  0.99*   GLUCOSE 241* 291 299*       S. Calcium:  Recent Labs     07/25/22 2358   CALCIUM 8.7       S. Ionized Calcium:No results for input(s): IONCA in the last 72 hours. S. Magnesium:  Recent Labs     07/25/22  2358   MG 1.9       S. Phosphorus:No results for input(s): PHOS in the last 72 hours. S. Glucose:  Recent Labs     07/25/22  2350   POCGLU 291*     Glycosylated hemoglobin A1C:   Lab Results   Component Value Date/Time    LABA1C 11.8 07/08/2022 08:29 AM     Hepatic:   Recent Labs     07/25/22 1735   AST 37*   ALT 39*   ALKPHOS 72       CARDIAC ENZY:   Recent Labs     07/25/22 1735 07/25/22  2139   TROPHS 15* 12       INR: No results for input(s): INR in the last 72 hours. BNP: No results for input(s): PROBNP in the last 72 hours. ABGs: No results for input(s): PH, PCO2, PO2, HCO3, O2SAT in the last 72 hours. Lipids: No results for input(s): CHOL, TRIG, HDL, LDL, LDLCALC in the last 72 hours. Pancreatic functions:  Recent Labs     07/25/22 1735   LIPASE 64*       S. LacticAcid: No results for input(s): LACTA in the last 72 hours. Thyroid functions:   Lab Results   Component Value Date/Time    TSH 4.04 03/09/2022 10:14 AM        U/A:  Recent Labs     07/25/22  1948   COLORU Yellow   SPECGRAV 1.021   LEUKOCYTESUR NEGATIVE   GLUCOSEU LARGE*     Recent Labs     07/25/22 1735   COVID19 Not Detected       Imaging/Diagonstics:     CT ABDOMEN PELVIS WO CONTRAST Additional Contrast? None    Result Date: 7/25/2022  EXAMINATION: CT OF THE ABDOMEN AND PELVIS WITHOUT CONTRAST 7/25/2022 6:59 pm TECHNIQUE: CT of the abdomen and pelvis was performed without the administration of intravenous contrast. Multiplanar reformatted images are provided for review.  Automated exposure control, iterative reconstruction, and/or weight based adjustment of the mA/kV was utilized to reduce the radiation dose to as low as reasonably achievable. COMPARISON: 03/14/2019 HISTORY: ORDERING SYSTEM PROVIDED HISTORY: abd pain TECHNOLOGIST PROVIDED HISTORY: abd pain Decision Support Exception - unselect if not a suspected or confirmed emergency medical condition->Emergency Medical Condition (MA) Reason for Exam: abd pain Additional signs and symptoms: Pt c/o abdominal pain, GERD, nausea FINDINGS: Lower Chest: Patchy, ground-glass opacity in the left lower lobe. 8 mm noncalcified nodule in the right middle lobe, unchanged from prior study. Organs: Prior cholecystectomy. Within the limitations of a noncontrast examination, no acute abnormality within the liver, spleen, pancreas, or adrenal glands. Nonobstructing right nephrolithiasis. No obstructing stone or hydronephrosis. 4.1 cm simple appearing left renal cyst.  There is also a 4.6 cm simple appearing cyst in the lower pole of the left kidney. No further imaging follow-up is required. GI/Bowel: Stomach is partially distended. The small bowel is nondilated. The appendix is normal in caliber. The colon is nondilated. Pelvis: Bladder is partially distended without vesicular stone. The uterus is present. Peritoneum/Retroperitoneum: No ascites or pneumoperitoneum. Atherosclerosis in the nondilated abdominal aorta. Bones/Soft Tissues: Multilevel degenerative changes in the lumbar spine. 1. No acute intra-abdominal abnormality. 2. Faint ground-glass opacity in the left lower lobe, likely infectious or inflammatory. 3. 8 mm right middle lobe nodule is unchanged, suggestive of benignity. 4. Nonobstructing right nephrolithiasis.      CT HEAD WO CONTRAST    Result Date: 7/25/2022  EXAMINATION: CT OF THE HEAD WITHOUT CONTRAST  7/25/2022 7:01 pm TECHNIQUE: CT of the head was performed without the administration of intravenous contrast. Automated exposure control, iterative reconstruction, and/or due to lipid rich plaque    Essential hypertension    Morbid (severe) obesity with alveolar hypoventilation (HCC)    Obstructive sleep apnea syndrome    Secondary diabetes mellitus with stage 3 chronic kidney disease (GFR 30-59) (HCC)    CKD (chronic kidney disease) stage 3, GFR 30-59 ml/min    Permanent atrial fibrillation (HCC)  Resolved Problems:    * No resolved hospital problems.  *        Plan:    Typical Atypical Chest Pain  -Troponin  15, repeat normal  -EKG - SR with SA, normal rate and intervals, nonspecific changes unchanged compared to old ekg-Per ER  -Previous stress test Perfusion:  No significant perfusion defects at stress or rest.  Function:  Normal  Risk stratification: LOW   -Previous echocardiogram as above  -Pain/nausea control  -EKG PRN chest pain/arrhythmia      Pneumonia   -IV rocephin and azithromycin antibiotic initiated in ED  --continue rocephin on admission  -switch azithro to doxy given the QT lengthening on ECG   -Check for atypicals   -Sputum C&S pending  -Repeat CXR if no clinical improvement   -Pneumovax before discharge if applicable    COPD   -Likely exacerbated by the pneumonia  -IV Zithromax given in ER , but changed given QTC lengthening   -Respiratory care eval and treat  -Albuterol aerosols PRN  -Sputum C&S - pending specimen  -No need for steroids at this time wheezing is very slight faint and expiratory and given elevated glucose      HIGINIO on CPAP  -RT protocol for house cpap, if patient can not obtain hers     Hypomagnesemia  -Mag level - 1.5  -  2 gm IV replacement ordered  -Recheck BMP with mag at 2200   -monitor magnesium level daily  -replacement protocol ordered     Prolong QTC  -QT/QTC - 382/448 - HR 83   -Previous QT/ QTC- 350/ 390 ( 4/22) Rate 75  - switched azithro to doxy  - hold zofran  - avoid narcotics   -avoid other prolonging medications      Diabetes Mellitus  -Continue home dose insulin  -hold oral hypoglycemics/metformin for now  -POCT ac and hs with

## 2022-07-26 NOTE — PROGRESS NOTES
Physical Therapy  Facility/Department: [address] [place] [address] CARE  Physical Therapy Initial Assessment    Name: Monique Wilkes  : 1950  MRN: [id number]  Date of Service: 2022    Discharge Recommendations:  Patient would benefit from continued therapy after discharge   PT Equipment Recommendations  Equipment Needed: No  Other: pt has rollator at home      Patient Diagnosis(es): The primary encounter diagnosis was Dizziness. Diagnoses of Hypomagnesemia syndrome and Pneumonia of left lower lobe due to infectious organism were also pertinent to this visit. Past Medical History:  has a past medical history of Abdominal bloating, Adenomatous polyp of ascending colon, Allergic rhinitis, Anxiety, Arthritis, Asthma, Atrial fibrillation ([address] Back pain, Benign hypertension with CKD (chronic kidney disease) stage III ([address] CAD (coronary artery disease), Caffeine use, CHF (congestive heart failure) ([address] Chronic kidney disease, CKD (chronic kidney disease) stage 3, GFR 30-59 ml/min ([facility]), COPD (chronic obstructive pulmonary disease) ([address] Degeneration of lumbar or lumbosacral intervertebral disc, Depression, DM (diabetes mellitus) ([address] Emphysema of lung ([address] Gastritis, GERD (gastroesophageal reflux disease), Hearing loss, Hematuria, Hiatal hernia, HTN (hypertension), benign, Hypertriglyceridemia, Incontinence of urine, Irritable bowel syndrome with both constipation and diarrhea, Knee arthropathy, Lumbosacral spondylosis without myelopathy, Migraine headache, Mumps, Neuropathy, Obesity, On home oxygen therapy, HIGINIO on CPAP, Otitis media, Secondary diabetes mellitus with stage 3 chronic kidney disease (GFR 30-59) ([address] Stroke ([address] Tinnitus, Type 2 diabetes mellitus without complication ([address] Type II or unspecified type diabetes mellitus without mention of complication, not stated as uncontrolled, UTI (lower urinary tract infection), and Varicose vein.   Past Surgical History:  has a past surgical history that includes Cholecystectomy (2007); Carpal tunnel release (Right); Tympanoplasty; Dilation & curettage (1979); Cystocopy (9/25/2013); Cataract removal with implant (Bilateral); Tonsillectomy; Incontinence surgery; ablation of dysrhythmic focus (2000); Upper gastrointestinal endoscopy (03/2016); eye surgery; Tubal ligation; other surgical history (09/10/15); Insertable Cardiac Monitor (12/04/2015); Tympanoplasty; Colonoscopy; Colonoscopy (04/10/2017); pr colsc flx w/removal lesion by hot bx forceps (N/A, 4/10/2017); Tympanostomy tube placement (08/21/2017); Upper gastrointestinal endoscopy (N/A, 3/2/2021); and Colonoscopy (N/A, 3/2/2021). Assessment   Assessment: Pt is CGA to amb wtih RW. Pt reports baseline balance and endurance. Pt would benefit frmo continued PT. Treatment Diagnosis: impaired functional mobility 2* dizziness  Specific Instructions for Next Treatment: gait, HEP, endurance tasks, therex  Therapy Prognosis: Fair;Good  Decision Making: Medium Complexity  Exam: ROM, MMT, bed mobility, transfers, amb, balance  Clinical Presentation: Pt alert, cooperative, pleasant  Barriers to Learning: [place]  Requires PT Follow-Up: Yes  Activity Tolerance  Activity Tolerance: Patient limited by endurance     Plan   Plan  Plan: 5-7 times per week  Specific Instructions for Next Treatment: gait, HEP, endurance tasks, therex  Current Treatment Recommendations: Strengthening, ROM, Balance training, Functional mobility training, Transfer training, Endurance training, Gait training, Equipment evaluation, education, & procurement, Patient/Caregiver education & training, Safety education & training, Home exercise program, Positioning, Therapeutic activities  Safety Devices  Type of Devices:  All fall risk precautions in place, Call light within reach, Gait belt, Patient at risk for falls, Left in chair, Nurse notified (REUBEN Fatima)  Restraints  Restraints Initially in Place: No Restrictions  Restrictions/Precautions  Restrictions/Precautions: Fall Risk, General Precautions (\"Kiara\")  Required Braces or Orthoses?: No  Implants present? : Metal implants (Loop recorder)  Position Activity Restriction  Other position/activity restrictions: PT OT eval and treat     Subjective   Pain: Pt denied pain  General  Chart Reviewed: Yes  Patient assessed for rehabilitation services?: Yes  Additional Pertinent Hx: O2 prn  Family / Caregiver Present: No  Referring Practitioner: VERONIKA Porter CNP  Referral Date : 07/25/22  Diagnosis: dizziness  Follows Commands: Within Functional Limits  Subjective  Subjective: Pt in bed, agreeable to PT OT eval. Pt is on 3L O2. RN Fausto Burks. pt retires up in chair. Social/Functional History  Social/Functional History  Lives With: Alone  Type of Home:  (8th floor)  Home Layout: One level  Home Access: Elevator  Bathroom Shower/Tub: Tub/Shower unit, Shower chair with back  Bathroom Toilet: Standard  Bathroom Equipment: Grab bars in shower, Shower chair, Hand-held shower  Bathroom Accessibility: Not accessible  Home Equipment: Lift chair, Oxygen, Hospital bed, Electric scooter, Rollator, Alert Button, Reacher  Has the patient had two or more falls in the past year or any fall with injury in the past year?: No (Pt reported \"near falls\" due to her L leg)  ADL Assistance: [address] [place] [address]: Needs assistance (Aide assist with cleaning, laundry, making meals- 5 days/week, 3 hrs/day)  Homemaking Responsibilities: Yes  Ambulation Assistance: Independent (Rollator inside home, recently using electric scooter d/t LEs)  Transfer Assistance: Independent  Active : No  Patient's  Info: Cab for doctor appointments  Mode of Transportation: Cab  IADL Comments: sleeps in hospital bed at home  Additional Comments: Family/aide assists with grocery shopping.  Pt is receiving outpatient PT for pain management (back/leg pain).  Vision/Hearing  Vision  Vision: Impaired  Vision Exceptions: Wears glasses at all times  Hearing  Hearing: Exceptions to [facility]  Hearing Exceptions: Hard of hearing/hearing concerns;Bilateral hearing aid (Hearing aids not present)    Cognition   Orientation  Overall Orientation Status: Within Functional Limits  Orientation Level: Oriented X4  Cognition  Overall Cognitive Status: WFL     Objective   Heart Rate: 79  Heart Rate Source: Monitor  BP: 118/66  BP Location: Right lower arm  MAP (Calculated): 83.33  Resp: 18  SpO2: 95 %  O2 Device: Nasal cannula     Observation/Palpation  Observation: O2 drop to 87% on 3L O2 post amb        AROM RLE (degrees)  RLE AROM: WFL  AROM LLE (degrees)  LLE AROM : WFL  AROM RUE (degrees)  RUE General AROM: See OT  AROM LUE (degrees)  LUE General AROM: See OT  Strength RLE  Strength RLE: WFL  Comment: Grossly 3+ to 4-/5  Strength LLE  Strength LLE: WFL  Comment: Grossly 3+ to 4-/5  Strength RUE  Comment: See OT  Strength LUE  Comment: See OT     Sensation  Overall Sensation Status: WFL (denied)     Bed mobility  Rolling to Right: Minimal assistance  Supine to Sit: Minimal assistance (Minimal assist for trunk)  Sit to Supine: Unable to assess  Scooting: Stand by assistance  Bed Mobility Comments: HOB elevated with use of handrails. No complaints of lightheadedness/dizziness upon sitting EOB. Transfers  Sit to Stand: Contact guard assistance  Stand to sit: Contact guard assistance  Bed to Chair: Contact guard assistance  Stand Pivot Transfers: Contact guard assistance  Comment: CGA with RW, cues as needed. Pt uses good technique. Ambulation  Surface: level tile  Device: Rolling Walker  Other Apparatus: O2 (3L)  Assistance: Contact guard assistance  Quality of Gait: slow jon, slight flexed forward posture, no LOB  Gait Deviations: Slow Jon  Distance: 20'  Comments: slow jon, reports fatigue during amb. Reports short distance amb at home.      Balance  Posture: Fair  Sitting - Static: Good;-  Sitting - Dynamic: Good;-  Standing - Static: Fair;+  Standing - Dynamic: Fair;+  Comments: Fall risk, standing balance with RW             AM-PAC Score  AM-PAC Inpatient Mobility Raw Score : 18 (07/26/22 1617)  AM-PAC Inpatient T-Scale Score : 43.63 (07/26/22 1617)  Mobility Inpatient CMS 0-100% Score: 46.58 (07/26/22 1617)  Mobility Inpatient CMS G-Code Modifier : CK (07/26/22 1617)          Goals  Short Term Goals  Time Frame for Short term goals: 5 days  Short term goal 1: Pt to demo bed mobility MOD I. Short term goal 2: Pt to demo transfers MOD I. Short term goal 3: Pt to amb 48' MOD I. Short term goal 4: Pt to demo good technique for HEP. Short term goal 5: Pt to tolerate 30-45 minute PT tx session to improve endurance and jon. Patient Goals   Patient goals : To go home       Education  Patient Education  Education Given To: Patient  Education Provided: Role of Therapy;Plan of Care;Precautions; Fall Prevention Strategies  Education Method: Verbal;Demonstration  Barriers to Learning: Hearing  Education Outcome: Continued education needed      Therapy Time   Individual Concurrent Group Co-treatment   Time In 6415         Time Out 1139         Minutes [place] [address] [place] [address]

## 2022-07-27 ENCOUNTER — HOSPITAL ENCOUNTER (OUTPATIENT)
Dept: PHYSICAL THERAPY | Age: 72
Setting detail: THERAPIES SERIES
Discharge: HOME | End: 2022-07-27
Payer: COMMERCIAL

## 2022-07-27 ENCOUNTER — CLINICAL DOCUMENTATION (OUTPATIENT)
Dept: PHYSICAL THERAPY | Age: 72
End: 2022-07-27

## 2022-07-27 ENCOUNTER — APPOINTMENT (OUTPATIENT)
Dept: CT IMAGING | Age: 72
DRG: 637 | End: 2022-07-27
Payer: COMMERCIAL

## 2022-07-27 LAB
ABSOLUTE EOS #: 0.2 K/UL (ref 0–0.4)
ABSOLUTE LYMPH #: 1.6 K/UL (ref 1–4.8)
ABSOLUTE MONO #: 0.5 K/UL (ref 0.1–1.3)
ALBUMIN SERPL-MCNC: 3.4 G/DL (ref 3.5–5.2)
ALP BLD-CCNC: 65 U/L (ref 35–104)
ALT SERPL-CCNC: 35 U/L (ref 5–33)
ANION GAP SERPL CALCULATED.3IONS-SCNC: 8 MMOL/L (ref 9–17)
AST SERPL-CCNC: 32 U/L
BASOPHILS # BLD: 1 % (ref 0–2)
BASOPHILS ABSOLUTE: 0.1 K/UL (ref 0–0.2)
BILIRUB SERPL-MCNC: 0.18 MG/DL (ref 0.3–1.2)
BUN BLDV-MCNC: 17 MG/DL (ref 8–23)
CALCIUM SERPL-MCNC: 8.9 MG/DL (ref 8.6–10.4)
CHLORIDE BLD-SCNC: 95 MMOL/L (ref 98–107)
CO2: 27 MMOL/L (ref 20–31)
CREAT SERPL-MCNC: 0.89 MG/DL (ref 0.5–0.9)
CULTURE: NORMAL
EOSINOPHILS RELATIVE PERCENT: 4 % (ref 0–4)
GFR AFRICAN AMERICAN: >60 ML/MIN
GFR NON-AFRICAN AMERICAN: >60 ML/MIN
GFR SERPL CREATININE-BSD FRML MDRD: ABNORMAL ML/MIN/{1.73_M2}
GLUCOSE BLD-MCNC: 178 MG/DL (ref 65–105)
GLUCOSE BLD-MCNC: 254 MG/DL (ref 65–105)
GLUCOSE BLD-MCNC: 260 MG/DL (ref 70–99)
GLUCOSE BLD-MCNC: 275 MG/DL (ref 65–105)
GLUCOSE BLD-MCNC: 304 MG/DL (ref 65–105)
HCT VFR BLD CALC: 39.2 % (ref 36–46)
HEMOGLOBIN: 13 G/DL (ref 12–16)
LIPASE: 83 U/L (ref 13–60)
LYMPHOCYTES # BLD: 26 % (ref 24–44)
MCH RBC QN AUTO: 30.6 PG (ref 26–34)
MCHC RBC AUTO-ENTMCNC: 33 G/DL (ref 31–37)
MCV RBC AUTO: 92.7 FL (ref 80–100)
MONOCYTES # BLD: 9 % (ref 1–7)
MYCOPLASMA PNEUMONIAE IGM: 0.48
PDW BLD-RTO: 13.5 % (ref 11.5–14.9)
PLATELET # BLD: 166 K/UL (ref 150–450)
PMV BLD AUTO: 9.7 FL (ref 6–12)
POTASSIUM SERPL-SCNC: 4.6 MMOL/L (ref 3.7–5.3)
RBC # BLD: 4.23 M/UL (ref 4–5.2)
SEG NEUTROPHILS: 60 % (ref 36–66)
SEGMENTED NEUTROPHILS ABSOLUTE COUNT: 3.7 K/UL (ref 1.3–9.1)
SODIUM BLD-SCNC: 130 MMOL/L (ref 135–144)
SPECIMEN DESCRIPTION: NORMAL
TOTAL PROTEIN: 6.5 G/DL (ref 6.4–8.3)
WBC # BLD: 6.1 K/UL (ref 3.5–11)

## 2022-07-27 PROCEDURE — 99233 SBSQ HOSP IP/OBS HIGH 50: CPT | Performed by: INTERNAL MEDICINE

## 2022-07-27 PROCEDURE — 6370000000 HC RX 637 (ALT 250 FOR IP): Performed by: NURSE PRACTITIONER

## 2022-07-27 PROCEDURE — 80053 COMPREHEN METABOLIC PANEL: CPT

## 2022-07-27 PROCEDURE — 2700000000 HC OXYGEN THERAPY PER DAY

## 2022-07-27 PROCEDURE — 94660 CPAP INITIATION&MGMT: CPT

## 2022-07-27 PROCEDURE — 97110 THERAPEUTIC EXERCISES: CPT

## 2022-07-27 PROCEDURE — 85025 COMPLETE CBC W/AUTO DIFF WBC: CPT

## 2022-07-27 PROCEDURE — 6360000002 HC RX W HCPCS: Performed by: NURSE PRACTITIONER

## 2022-07-27 PROCEDURE — 83690 ASSAY OF LIPASE: CPT

## 2022-07-27 PROCEDURE — 94761 N-INVAS EAR/PLS OXIMETRY MLT: CPT

## 2022-07-27 PROCEDURE — 97116 GAIT TRAINING THERAPY: CPT

## 2022-07-27 PROCEDURE — 36415 COLL VENOUS BLD VENIPUNCTURE: CPT

## 2022-07-27 PROCEDURE — 2580000003 HC RX 258: Performed by: NURSE PRACTITIONER

## 2022-07-27 PROCEDURE — 74176 CT ABD & PELVIS W/O CONTRAST: CPT

## 2022-07-27 PROCEDURE — 82947 ASSAY GLUCOSE BLOOD QUANT: CPT

## 2022-07-27 PROCEDURE — 6370000000 HC RX 637 (ALT 250 FOR IP): Performed by: INTERNAL MEDICINE

## 2022-07-27 PROCEDURE — 1200000000 HC SEMI PRIVATE

## 2022-07-27 RX ORDER — BUDESONIDE AND FORMOTEROL FUMARATE DIHYDRATE 160; 4.5 UG/1; UG/1
2 AEROSOL RESPIRATORY (INHALATION) 2 TIMES DAILY
Qty: 10.2 G | Refills: 2 | Status: SHIPPED | OUTPATIENT
Start: 2022-07-27

## 2022-07-27 RX ORDER — DOXYCYCLINE 100 MG/1
100 CAPSULE ORAL EVERY 12 HOURS SCHEDULED
Qty: 11 CAPSULE | Refills: 0 | Status: SHIPPED | OUTPATIENT
Start: 2022-07-27 | End: 2022-08-02

## 2022-07-27 RX ORDER — UBIDECARENONE 75 MG
CAPSULE ORAL
Qty: 30 TABLET | Refills: 0 | Status: SHIPPED | OUTPATIENT
Start: 2022-07-27

## 2022-07-27 RX ORDER — CALCIUM CITRATE/VITAMIN D3 200MG-6.25
TABLET ORAL
Qty: 100 EACH | Refills: 3 | Status: SHIPPED | OUTPATIENT
Start: 2022-07-27

## 2022-07-27 RX ORDER — FUROSEMIDE 20 MG/1
TABLET ORAL
Qty: 30 TABLET | Refills: 0 | Status: SHIPPED | OUTPATIENT
Start: 2022-07-27 | End: 2022-10-13 | Stop reason: SDUPTHER

## 2022-07-27 RX ORDER — BLOOD SUGAR DIAGNOSTIC
STRIP MISCELLANEOUS
Qty: 100 STRIP | Refills: 0 | Status: SHIPPED | OUTPATIENT
Start: 2022-07-27

## 2022-07-27 RX ORDER — PHENOL 1.4 %
10 AEROSOL, SPRAY (ML) MUCOUS MEMBRANE NIGHTLY
Qty: 90 TABLET | Refills: 3 | Status: SHIPPED | OUTPATIENT
Start: 2022-07-27

## 2022-07-27 RX ORDER — DICYCLOMINE HYDROCHLORIDE 10 MG/1
CAPSULE ORAL
Qty: 60 CAPSULE | Refills: 0 | Status: SHIPPED | OUTPATIENT
Start: 2022-07-27

## 2022-07-27 RX ORDER — DICYCLOMINE HYDROCHLORIDE 10 MG/1
10 CAPSULE ORAL 4 TIMES DAILY PRN
Status: DISCONTINUED | OUTPATIENT
Start: 2022-07-27 | End: 2022-07-28 | Stop reason: HOSPADM

## 2022-07-27 RX ADMIN — DOXYCYCLINE 100 MG: 100 CAPSULE ORAL at 10:02

## 2022-07-27 RX ADMIN — BENZONATATE 200 MG: 200 CAPSULE ORAL at 10:03

## 2022-07-27 RX ADMIN — ENOXAPARIN SODIUM 30 MG: 100 INJECTION SUBCUTANEOUS at 22:09

## 2022-07-27 RX ADMIN — INSULIN GLARGINE 55 UNITS: 100 INJECTION, SOLUTION SUBCUTANEOUS at 22:11

## 2022-07-27 RX ADMIN — ACETAMINOPHEN 650 MG: 325 TABLET ORAL at 10:14

## 2022-07-27 RX ADMIN — DICYCLOMINE HYDROCHLORIDE 10 MG: 10 CAPSULE ORAL at 22:08

## 2022-07-27 RX ADMIN — Medication 3 MG: at 22:08

## 2022-07-27 RX ADMIN — DOXYCYCLINE 100 MG: 100 CAPSULE ORAL at 22:08

## 2022-07-27 RX ADMIN — CLOPIDOGREL BISULFATE 75 MG: 75 TABLET ORAL at 10:03

## 2022-07-27 RX ADMIN — SODIUM CHLORIDE: 9 INJECTION, SOLUTION INTRAVENOUS at 22:07

## 2022-07-27 RX ADMIN — CARVEDILOL 6.25 MG: 6.25 TABLET, FILM COATED ORAL at 22:08

## 2022-07-27 RX ADMIN — INSULIN GLARGINE 55 UNITS: 100 INJECTION, SOLUTION SUBCUTANEOUS at 00:34

## 2022-07-27 RX ADMIN — INSULIN LISPRO 4 UNITS: 100 INJECTION, SOLUTION INTRAVENOUS; SUBCUTANEOUS at 10:15

## 2022-07-27 RX ADMIN — BENZONATATE 200 MG: 200 CAPSULE ORAL at 22:08

## 2022-07-27 RX ADMIN — LOSARTAN POTASSIUM 25 MG: 25 TABLET, FILM COATED ORAL at 10:03

## 2022-07-27 RX ADMIN — CEFTRIAXONE SODIUM 1000 MG: 1 INJECTION, POWDER, FOR SOLUTION INTRAMUSCULAR; INTRAVENOUS at 22:10

## 2022-07-27 RX ADMIN — INSULIN GLARGINE 55 UNITS: 100 INJECTION, SOLUTION SUBCUTANEOUS at 10:03

## 2022-07-27 RX ADMIN — CARVEDILOL 6.25 MG: 6.25 TABLET, FILM COATED ORAL at 10:02

## 2022-07-27 RX ADMIN — ENOXAPARIN SODIUM 30 MG: 100 INJECTION SUBCUTANEOUS at 10:14

## 2022-07-27 RX ADMIN — INSULIN LISPRO 4 UNITS: 100 INJECTION, SOLUTION INTRAVENOUS; SUBCUTANEOUS at 11:43

## 2022-07-27 RX ADMIN — ATORVASTATIN CALCIUM 40 MG: 40 TABLET, FILM COATED ORAL at 11:26

## 2022-07-27 RX ADMIN — SODIUM CHLORIDE, PRESERVATIVE FREE 10 ML: 5 INJECTION INTRAVENOUS at 11:26

## 2022-07-27 ASSESSMENT — ENCOUNTER SYMPTOMS
WHEEZING: 0
ABDOMINAL PAIN: 1
CONSTIPATION: 1
STRIDOR: 0
BLOOD IN STOOL: 0
SHORTNESS OF BREATH: 1
VOMITING: 1
COUGH: 1
NAUSEA: 1

## 2022-07-27 ASSESSMENT — PAIN DESCRIPTION - LOCATION
LOCATION: ABDOMEN

## 2022-07-27 ASSESSMENT — PAIN DESCRIPTION - DESCRIPTORS
DESCRIPTORS: CRAMPING
DESCRIPTORS: SHARP
DESCRIPTORS: CRAMPING
DESCRIPTORS: CRAMPING

## 2022-07-27 ASSESSMENT — PAIN DESCRIPTION - ORIENTATION
ORIENTATION: LEFT
ORIENTATION: LEFT;LOWER
ORIENTATION: LEFT
ORIENTATION: LEFT;LOWER

## 2022-07-27 ASSESSMENT — PAIN SCALES - GENERAL
PAINLEVEL_OUTOF10: 9
PAINLEVEL_OUTOF10: 9
PAINLEVEL_OUTOF10: 4
PAINLEVEL_OUTOF10: 8
PAINLEVEL_OUTOF10: 9

## 2022-07-27 NOTE — PLAN OF CARE
Problem: Discharge Planning  Goal: Discharge to home or other facility with appropriate resources  7/27/2022 1656 by Heidy Guevara RN  Outcome: Progressing  Flowsheets (Taken 7/27/2022 6013)  Discharge to home or other facility with appropriate resources: Identify barriers to discharge with patient and caregiver     Problem: Skin/Tissue Integrity  Goal: Absence of new skin breakdown  Description: 1. Monitor for areas of redness and/or skin breakdown  2. Assess vascular access sites hourly  3. Every 4-6 hours minimum:  Change oxygen saturation probe site  4. Every 4-6 hours:  If on nasal continuous positive airway pressure, respiratory therapy assess nares and determine need for appliance change or resting period.   7/27/2022 1656 by Heidy Guevara RN  Outcome: Progressing     Problem: Safety - Adult  Goal: Free from fall injury  7/27/2022 1656 by Heidy Guevara RN  Outcome: Progressing  Flowsheets (Taken 7/27/2022 1040)  Free From Fall Injury: Instruct family/caregiver on patient safety     Problem: ABCDS Injury Assessment  Goal: Absence of physical injury  7/27/2022 1656 by Heidy Guevara RN  Outcome: Progressing  Flowsheets (Taken 7/27/2022 1040)  Absence of Physical Injury: Implement safety measures based on patient assessment     Problem: Chronic Conditions and Co-morbidities  Goal: Patient's chronic conditions and co-morbidity symptoms are monitored and maintained or improved  7/27/2022 1656 by Heidy Guevara RN  Outcome: Progressing  Flowsheets (Taken 7/27/2022 0047)  Care Plan - Patient's Chronic Conditions and Co-Morbidity Symptoms are Monitored and Maintained or Improved: Monitor and assess patient's chronic conditions and comorbid symptoms for stability, deterioration, or improvement     Problem: Pain  Goal: Verbalizes/displays adequate comfort level or baseline comfort level  Outcome: Progressing

## 2022-07-27 NOTE — DISCHARGE SUMMARY
[x] [facility] @ [facility]  [address] [facility] [address] , [address] [place] [facility] [facility]  Phone [phone]  Fax [phone]    Physical Therapy Discharge Note    Date: 2022      Patient: Mayelin Dang  : 1950  MRN: [id number]    Physician: Chi Duarte MD       Diagnosis:   M54.42, M54.41, G89.29 (ICD-10-CM) - Chronic bilateral low back pain with bilateral sciatica   E66.01, Z68.41 (ICD-10-CM) - Morbid obesity with BMI of 40.0-44.9, adult ([facility]   M47.817 (ICD-10-CM) - Lumbosacral spondylosis without myelopathy    Onset Date:     Rehab Diagnosis: Generalized weakness, gait difficulties, chronic back pain  ICD-10 Codes: M54.42, M54.41, R53.1, R26.9  Total visits attended:   Cancels/No shows: 1/0  Date of initial visit: 22                   [] Patient recovered from conditions. Treatment goals were met. [] Patient received maximum benefit. No further therapy indicated at this time. [] Patient demonstrated improvement from condition with  ** Of  ** Short term goals met. []Patient demonstrated improvement from condition with **   Of **  Long term goals met. [] Patient to continue exercise/home instructions independently. [x] Therapy interrupted due to: see below    [] Patient has 2 or more no shows/cancels, is discontinued per our policy. [] Patient has completed prescribed number of treatment sessions. [x] Other: Pt admitted to hospital for pneumonia and uncontrolled hyperglycemia. Per chart notes, pt will be receiving home health nursing and therapy upon discharge. Spoke with pt's son, Tammy Mackenzie today, stating that Medicare guidelines to require us to discharge from outpatient PT for a hospital admission, especially with pt transitioning home with home health therapy in near future.  Informed son that we can get her back into outpatient PT once finished with home health therapy if she would like, she will just require a new physician referral at that time. Verbalized understanding and pt will be discharged from outpt PT at this time. Pain level at evaluation was       5/10 and at discharge was       unknown/10    It Is My Understanding That The:  [] Patient returned to work. [] Patient demonstrated improved level of function. [] Patient returned to previous functional level. [] Patient's current functional status is unknown due to no-shows  [x] Other:     Recommendations/Comments:         Treatment Included:     [x] Therapeutic Exercise   56766  [] Iontophoresis: 4 mg/mL Dexamethasone Sodium Phosphate  mAmin  33374   [] Therapeutic Activity  39361 [] Vasopneumatic cold with compression  97206    [] Gait Training   36654 [] Ultrasound   11623   [] Neuromuscular Re-education  09414 [] Electrical Stimulation Unattended  04061   [] Manual Therapy  04611 [] Electrical Stimulation Attended  71726   [x] Instruction in HEP  [] Lumbar/Cervical Traction  03908   [] Aquatic Therapy   35335 [] Cold/hotpack    [] Massage   39161      [] Dry Needling, 1 or 2 muscles  31419   [] Biofeedback, first 15 minutes   05539  [] Biofeedback, additional 15 minutes   75660 [] Dry Needling, 3 or more muscles  95649                If you have any questions or concerns regarding this patient's care, please contact us.    Thank you for your referral.      Electronically signed by: Sue Spears PT

## 2022-07-27 NOTE — PROGRESS NOTES
Physical Therapy  Facility/Department: [facility] PROGRESSIVE CARE  Daily Treatment Note  NAME: Keyshawn Perez  : 1950  MRN: [id number]    Date of Service: 2022    Discharge Recommendations:  Patient would benefit from continued therapy after discharge   PT Equipment Recommendations  Equipment Needed: No  Other: pt has rollator at home    Patient Diagnosis(es): The primary encounter diagnosis was Dizziness. Diagnoses of Hypomagnesemia syndrome, Pneumonia of left lower lobe due to infectious organism, Type 2 diabetes mellitus with diabetic polyneuropathy, with long-term current use of insulin ([address] Other chest pain, Hyperlipidemia with target LDL less than 100, and Abdominal spasms were also pertinent to this visit. Assessment   Activity Tolerance: Patient tolerated treatment well (elevated BP)  Equipment Needed: No  Other: pt has rollator at home     Plan    Plan  Plan: 5-7 times per week  Specific Instructions for Next Treatment: gait, HEP, endurance tasks, therex  Current Treatment Recommendations: Strengthening;ROM;Balance training;Functional mobility training;Transfer training; Endurance training;Gait training;Equipment evaluation, education, & procurement;Patient/Caregiver education & training; Safety education & training;Home exercise program;Positioning; Therapeutic activities     Restrictions  Restrictions/Precautions  Restrictions/Precautions: Fall Risk, General Precautions   Required Braces or Orthoses?: No  Implants present? : Metal implants (Loop recorder)  Position Activity Restriction  Other position/activity restrictions:     Subjective    Subjective  Subjective: Pt reports having blood pressure issues earlier. REUBEN Georges and REUBEN Echeverria reports pt's BP while in bed was 124/49.   Pain: 9/10  Orientation  Overall Orientation Status: Within Functional Limits  Orientation Level: Oriented X4  Cognition  Overall Cognitive Status: WFL     Objective   Vitals  (Seated EOB) Blood Pressure 117/56 with HR 74     Bed Mobility Training  Bed Mobility Training: Yes  Rolling: Modified independent  Supine to Sit: Supervision  Sit to Supine: Supervision  Scooting: Supervision  Balance  Sitting: Intact  Sitting - Static: Good (unsupported)  Sitting - Dynamic: Good (unsupported) (1 UE support)  Standing: Impaired  Standing - Static: Good  Standing - Dynamic: Good  Transfer Training  Transfer Training: Yes  Interventions: Safety awareness training;Verbal cues  Sit to Stand: Supervision  Stand to Sit: Supervision  Stand Pivot Transfers: Supervision  Bed to Chair: Supervision  Gait Training  Gait Training: Yes  Gait  Overall Level of Assistance: Supervision  Interventions: Verbal cues  Base of Support: Widened  Speed/Hetal: Slow  Step Length: Left shortened;Right shortened  Gait Abnormalities: Decreased step clearance; Path deviations; Shuffling gait;Trunk sway increased  Distance (ft): 18 Feet  Assistive Device: Walker, rolling     PT Exercises  Exercise Treatment: BP Monitored and was WNL, RN present and confirm WNL  A/AROM Exercises: Seated bilat LE exercises [id number]  Static Sitting Balance Exercises: Dangle EOB 15min. Static Standing Balance Exercises: Standing Tolerance <2min.      Safety Devices  Type of Devices: Left in bed;Bed alarm in place;Call light within reach;Nurse notified   [facility] Mobility Inpatient   How much difficulty turning over in bed?: None  How much difficulty sitting down on / standing up from a chair with arms?: A Little  How much difficulty moving from lying on back to sitting on side of bed?: A Little  How much help from another person moving to and from a bed to a chair?: A Little  How much help from another person needed to walk in hospital room?: A Little  How much help from another person for climbing 3-5 steps with a railing?: Total  AM-PAC Inpatient Mobility Raw Score : 17  AM-PAC Inpatient T-Scale Score : 42.13  Mobility Inpatient CMS 0-100% Score: 50.57  Mobility Inpatient CMS G-Code Modifier : CK     Goals  Short Term Goals  Time Frame for Short term goals: 5 days  Short term goal 1: Pt to demo bed mobility MOD I. Short term goal 2: Pt to demo transfers MOD I. Short term goal 3: Pt to amb 48' MOD I. Short term goal 4: Pt to demo good technique for HEP. Short term goal 5: Pt to tolerate 30-45 minute PT tx session to improve endurance and jon. Patient Goals   Patient goals : To go home    Education  Patient Education  Education Given To: Patient  Education Provided: Role of Therapy;Plan of Care;Precautions; Fall Prevention Strategies  Education Method: Verbal;Demonstration  Barriers to Learning: Hearing  Education Outcome: Continued education needed    Therapy Time   Individual Concurrent Group Co-treatment   Time In 1105         Time Out 0343         Minutes [facility]. [address] [place] [address]

## 2022-07-27 NOTE — H&P
[address] [facility]     Progress note            Date:   7/27/2022  Patientname:  Chester Hartmann  Date of admission:  7/25/2022  4:31 PM  MRN:   [id number]  Account:  [de-identified]  YOB: 1950  PCP:    Ardia Gottron, APRN - CNP  Room:   2114/2114-01  Code Status:    Full Code    CHIEF COMPLAINT     Chief Complaint   Patient presents with    Dizziness     Was at physical therapy today, felt dizzy. Elevated BS       HISTORY OF PRESENT ILLNESS  (Character, Onset, Location, Duration,  Exacerbating/RelievingFactors, Radiation,   Associated Symptoms, Severity )      The patient is a 66 y.o. female, with a history of AFIB-, CKD-3, CHF, COPD, DM HTN, CAD, HIGINIO who presents with concerns of hyperglycemia with blood sugars into the 500's, left lateral chest pain. Patient states that she has been having increased blood sugars over the past few days and today the blood sugars have been elevated into the 500's and she was taking her insulin without significant decrease in glucose. Patient admits to associated left chest pain shortness of breath, subjective low grade fevers and chills. Further questioning, the patient located the chest pain to the left lateral chest wall under the axilla. The pain is worse with respiration, but baseline constant, there is no radiation, there is associated  shortness of breath and nausea. Patient admits to increase use of her home oxygen from as needed to nearly all day, and increase uses of nebulizer. Admits to dry NP cough.   .       Work up in the emergency room       Laboratories:   Metabolic panel: Sodium 608, potassium 4.0, chloride 91, CO2 31, BUN 21, creatinine 1.08, museum 1.5, glucose 241  Cardiac Markers: High sensitive troponin 15 with a repeat of 12  Liver profile: ALT 39, AST 37, lipase 64  Hematology: No acute leukocytosis or anemias  Urine-large glucose otherwise within normal limits      Imaging and Diagnostics:   EKG (as documented in ED note):          CT ABDOMEN PELVIS WO CONTRAST Additional Contrast? None    Result Date: 7/25/2022    1. No acute intra-abdominal abnormality. 2. Faint ground-glass opacity in the left lower lobe, likely infectious or inflammatory. 3. 8 mm right middle lobe nodule is unchanged, suggestive of benignity. 4. Nonobstructing right nephrolithiasis. CT HEAD WO CONTRAST    Result Date: 7/25/2022  No acute intracranial abnormality. PAST MEDICAL HISTORY   Patient  has a past medical history of Abdominal bloating, Adenomatous polyp of ascending colon, Allergic rhinitis, Anxiety, Arthritis, Asthma, Atrial fibrillation ([address] Back pain, Benign hypertension with CKD (chronic kidney disease) stage III ([address] CAD (coronary artery disease), Caffeine use, CHF (congestive heart failure) ([address] Chronic kidney disease, CKD (chronic kidney disease) stage 3, GFR 30-59 ml/min ([facility]), COPD (chronic obstructive pulmonary disease) ([address] Degeneration of lumbar or lumbosacral intervertebral disc, Depression, DM (diabetes mellitus) ([address] Emphysema of lung ([address] Gastritis, GERD (gastroesophageal reflux disease), Hearing loss, Hematuria, Hiatal hernia, HTN (hypertension), benign, Hypertriglyceridemia, Incontinence of urine, Irritable bowel syndrome with both constipation and diarrhea, Knee arthropathy, Lumbosacral spondylosis without myelopathy, Migraine headache, Mumps, Neuropathy, Obesity, On home oxygen therapy, HIGINIO on CPAP, Otitis media, Secondary diabetes mellitus with stage 3 chronic kidney disease (GFR 30-59) ([address] Stroke ([address] Tinnitus, Type 2 diabetes mellitus without complication ([address] Type II or unspecified type diabetes mellitus without mention of complication, not stated as uncontrolled, UTI (lower urinary tract infection), and Varicose vein. PAST SURGICAL HISTORY    Patient  has a past surgical history that includes Cholecystectomy (2007); Carpal tunnel release (Right);  Tympanoplasty; Dilation & curettage (1979); Cystocopy (9/25/2013); Cataract removal with implant (Bilateral); Tonsillectomy; Incontinence surgery; ablation of dysrhythmic focus (2000); Upper gastrointestinal endoscopy (03/2016); eye surgery; Tubal ligation; other surgical history (09/10/15); Insertable Cardiac Monitor (12/04/2015); Tympanoplasty; Colonoscopy; Colonoscopy (04/10/2017); pr colsc flx w/removal lesion by hot bx forceps (N/A, 4/10/2017); Tympanostomy tube placement (08/21/2017); Upper gastrointestinal endoscopy (N/A, 3/2/2021); and Colonoscopy (N/A, 3/2/2021). FAMILY HISTORY    Patient family history includes Diabetes in her maternal grandmother and mother; Emphysema in her sister; Heart Disease in her mother; Stomach Cancer in her father. SOCIAL HISTORY    Patient  reports that she quit smoking about 22 years ago. Her smoking use included cigarettes. She has a 123.00 pack-year smoking history. She has never used smokeless tobacco. She reports that she does not drink alcohol and does not use drugs. HOME MEDICATIONS        Prior to Admission medications    Medication Sig Start Date End Date Taking? Authorizing Provider   budesonide-formoterol (SYMBICORT) 160-4.5 MCG/ACT AERO Inhale 2 puffs into the lungs in the morning and 2 puffs before bedtime. As needed for sob. 7/27/22  Yes Chris Kam MD   metFORMIN (GLUCOPHAGE) 1000 MG tablet Take 1 tablet by mouth in the morning and 1 tablet in the evening. Take with meals. 7/27/22  Yes Chris Kam MD   blood glucose test strips (TRUE METRIX BLOOD GLUCOSE TEST) strip THREE TIMES A DAY 7/27/22  Yes Chris Kam MD   blood glucose test strips (ONETOUCH ULTRA) strip TEST TWO (2) TO THREE (3) TIMES A DAY & AS NEEDED FOR SYMPTOMS OF IRREGULAR BLOOD GLUCOSE 7/27/22  Yes Chris Kam MD   blood glucose test strips (ONETOUCH ULTRA) strip As needed.  7/27/22  Yes Chris Kam MD   furosemide (LASIX) 20 MG tablet TAKE 1 TABLET BY MOUTH ONCE DAILY AS NEEDED 7/27/22  Yes Zachariah Estrada MD   vitamin B-12 (CYANOCOBALAMIN) 100 MCG tablet take half a tablet BY MOUTH ONCE DAILY 7/27/22  Yes Zachariah Estrada MD   Melatonin 10 MG TABS Take 10 mg by mouth nightly 7/27/22  Yes Zachariah Estrada MD   doxycycline monohydrate (MONODOX) 100 MG capsule Take 1 capsule by mouth in the morning and 1 capsule before bedtime. Do all this for 11 doses. 7/27/22 8/2/22 Yes Zachariah Estrada MD   dicyclomine (BENTYL) 10 MG capsule TAKE 1 CAPSULE BY MOUTH TWICE A DAY AS NEEDED FOR ABDOMINAL SPASMS 7/27/22  Yes Zachariah Estrada MD    MG capsule TAKE 1 CAPSULE BY MOUTH TWICE A DAY 7/18/22   VERONIKA Vasquez CNP   SM ASPIRIN ADULT LOW STRENGTH 81 MG EC tablet TAKE 1 TABLET BY MOUTH ONCE DAILY 7/18/22   VERONIKA Vasquez CNP   pantoprazole (PROTONIX) 40 MG tablet TAKE 1 TABLET BY MOUTH TWICE A DAY 7/18/22   VERONIKA Vasquez CNP   isosorbide dinitrate (ISORDIL) 30 MG tablet TAKE 1 TABLET BY MOUTH ONCE DAILY 7/18/22   VERONIKA Vasquez CNP   oxyCODONE-acetaminophen (PERCOCET) 5-325 MG per tablet Take 1 tablet by mouth every 8 hours as needed for Pain for up to 30 days. Intended supply: 30 days.  7/13/22 8/12/22  [place] Nurse VERONIKA Cardoso CNP   escitalopram (LEXAPRO) 10 MG tablet Take 1 tablet by mouth daily 7/8/22 10/6/22  VERONIKA Vasquez CNP   Dulaglutide (TRULICITY) 1.5 WB/2.2VP SOPN Inject 1.5 mg into the skin once a week 7/8/22   VERONIKA Vasquez CNP   FEROSUL 325 (65 Fe) MG tablet TAKE 1 TAB BY MOUTH IN THE MORNING 6/20/22   VERONIKA Snowden CNP   clopidogrel (PLAVIX) 75 MG tablet TAKE 1 TAB BY MOUTH ONCE A DAY ( IN THE MORNING ) -THIS MEDICINE MAY BE TAKENWITH OR WITHOUT FOOD 5/25/22   Renella A Gauamis, APRN - CNP   oxybutynin (DITROPAN-XL) 5 MG extended release tablet TAKE 1 TABLET BY MOUTH DAILY 3/30/22   Sofya Cook MD   fenofibrate (TRIGLIDE) 160 MG tablet TAKE 1 TABLET BY MOUTH DAILY 3/30/22   Sofya Cook MD   insulin aspart ([employer] FLEXPEN) 100 UNIT/ML injection pen IF<139 NO INSULIN; 140-199-2 UN;200-249-4 UN;250-299-6 UN;300-349-8 UN;350-400=10 UN;ABOVE 400-12 UNIT(S) 3/30/22   Nicolasa Nelson MD   magnesium oxide (MAG-OX) 400 MG tablet  2/21/22   Historical Provider, MD   insulin glargine (BASAGLAR KWIKPEN) 100 UNIT/ML injection pen Inject 55 Units into the skin 2 times daily 3/1/22   Nicolasa Nelson MD   losartan (COZAAR) 25 MG tablet TAKE 1 TABLET BY MOUTH ONCE DAILY 2/21/22   Nicolasa Nelson MD   carvedilol (COREG) 3.125 MG tablet TAKE 1 TAB BY MOUTH TWICE A DAY ( IN THE MORNING AND BEDTIME ) 1/21/22   Nicolasa Nelson MD   Multiple Vitamins-Minerals (CERTAVITE/ANTIOXIDANTS) TABS TAKE 1 TABLET BY MOUTH ONCE DAILY 12/8/21   Nicolasa Nelson MD   topiramate (TOPAMAX) 100 MG tablet TAKE 1 TABLET BY MOUTH NIGHTLY  10/25/21   Merlin Adolph, MD   atorvastatin (LIPITOR) 40 MG tablet Take 1 tablet by mouth daily 2/28/21   Nicolasa Nelson MD   albuterol (PROVENTIL) (2.5 MG/3ML) 0.083% nebulizer solution Take 3 mLs by nebulization every 6 hours as needed for Wheezing 11/3/20   Kelly Crowell MD   OXYGEN Inhale 3 L into the lungs     Historical Provider, MD       ALLERGIES      Robitussin [guaifenesin], Codeine, Compazine [prochlorperazine maleate], Iodides, Morphine, Moxifloxacin, Reglan [metoclopramide], Avelox [moxifloxacin hcl in nacl], Cipro xr, Sulfa antibiotics, and Zofran [ondansetron hcl]    REVIEW OF SYSTEMS     Review of Systems   Constitutional:  Positive for appetite change (decreased.), chills, diaphoresis and fatigue. Negative for fever. HENT:  Negative for congestion and hearing loss. Respiratory:  Positive for cough (dry np) and shortness of breath (few days , progressive in nature , home oxygen 2 liters as needed. has been using nebulizers and oxygen more frequently). Negative for wheezing and stridor.     Cardiovascular:  Positive for chest pain (left lateral chest wall, non-radiant) and leg swelling (improves with rest and elevation). Negative for palpitations (chronic afib- blood thinner was removed by some provider-). Gastrointestinal:  Positive for abdominal pain (lower left quadrant- spasm/ cramp), constipation, nausea (few days) and vomiting (yellow, small amount x3). Negative for blood in stool. Genitourinary:  Positive for difficulty urinating, frequency and urgency. Negative for dysuria. Brown discoloration to urine    Musculoskeletal:  Positive for arthralgias. Negative for myalgias. Skin:  Negative for rash. Neurological:  Positive for dizziness (\"fuzziness\" ongoing for about a week.), weakness (bilateral now new) and headaches (this am, not any different than previous headaches). Negative for seizures. Psychiatric/Behavioral:  The patient is not nervous/anxious. PHYSICAL EXAM      BP (!) 124/56   Pulse 77   Temp 98 °F (36.7 °C) (Oral)   Resp 19   Ht 5' 2\" (1.575 m)   Wt 244 lb 4.3 oz (110.8 kg)   LMP  (LMP Unknown)   SpO2 91%   BMI 44.68 kg/m²  Body mass index is 44.68 kg/m². Physical Exam  Vitals and nursing note reviewed. Constitutional:       General: She is not in acute distress. Appearance: She is well-developed. She is not diaphoretic. HENT:      Head: Normocephalic and atraumatic. Right Ear: Hearing normal.      Left Ear: Hearing normal.      Nose: Nose normal. No rhinorrhea. Eyes:      General: Lids are normal.      Extraocular Movements:      Right eye: Normal extraocular motion. Left eye: Normal extraocular motion. Conjunctiva/sclera: Conjunctivae normal.      Right eye: Right conjunctiva is not injected. Left eye: Left conjunctiva is not injected. Pupils: Pupils are equal, round, and reactive to light. Pupils are equal.      Right eye: Pupil is reactive. Left eye: Pupil is reactive. Neck:      Thyroid: No thyromegaly. Vascular: No carotid bruit. Trachea: Trachea and phonation normal. No tracheal deviation.    Cardiovascular: Rate and Rhythm: Normal rate and regular rhythm. Pulses: Normal pulses. Heart sounds: Normal heart sounds. No murmur heard. Pulmonary:      Effort: Pulmonary effort is normal. No respiratory distress. Breath sounds: No stridor. Examination of the right-lower field reveals decreased breath sounds. Examination of the left-lower field reveals decreased breath sounds. Decreased breath sounds and wheezing (expiratory scattered t/o anterior) present. Abdominal:      General: Bowel sounds are normal. There is no distension. Palpations: Abdomen is soft. There is no mass. Tenderness: There is no abdominal tenderness. There is no guarding. Musculoskeletal:         General: No tenderness. Cervical back: Neck supple. Skin:     General: Skin is warm and dry. Findings: No erythema, lesion or rash. Neurological:      Mental Status: She is alert and oriented to person, place, and time. She is not disoriented. Cranial Nerves: No cranial nerve deficit. Psychiatric:         Speech: Speech normal.         Behavior: Behavior normal. Behavior is cooperative. DIAGNOSTICS          Labs:  CBC:   Recent Labs     07/25/22  1735 07/27/22  1413   WBC 8.3 6.1   HGB 14.4 13.0    166       BMP:    Recent Labs     07/25/22  1735 07/25/22  2350 07/25/22  2358 07/27/22  1413   *  --  133* 130*   K 4.0  --  4.1 4.6   CL 91*  --  96* 95*   CO2 31  --  27 27   BUN 21  --  17 17   CREATININE 1.08*  --  0.99* 0.89   GLUCOSE 241* 291 299* 260*       S. Calcium:  Recent Labs     07/27/22  1413   CALCIUM 8.9       S. Ionized Calcium:No results for input(s): IONCA in the last 72 hours. S. Magnesium:  Recent Labs     07/25/22  2358   MG 1.9       S. Phosphorus:No results for input(s): PHOS in the last 72 hours.   S. Glucose:  Recent Labs     07/27/22  0847 07/27/22  1130 07/27/22  1618   POCGLU 275* 304* 178*       Glycosylated hemoglobin A1C:   Lab Results   Component Value Date/Time LABA1C 11.8 07/08/2022 08:29 AM     Hepatic:   Recent Labs     07/25/22  1735 07/27/22  1413   AST 37* 32*   ALT 39* 35*   ALKPHOS 72 65       CARDIAC ENZY:   Recent Labs     07/25/22 1735 07/25/22  2139   TROPHS 15* 12       INR: No results for input(s): INR in the last 72 hours. BNP: No results for input(s): PROBNP in the last 72 hours. ABGs: No results for input(s): PH, PCO2, PO2, HCO3, O2SAT in the last 72 hours. Lipids: No results for input(s): CHOL, TRIG, HDL, LDL, LDLCALC in the last 72 hours. Pancreatic functions:  Recent Labs     07/25/22 1735 07/27/22  1413   LIPASE 64* 83*       S. LacticAcid: No results for input(s): LACTA in the last 72 hours. Thyroid functions:   Lab Results   Component Value Date/Time    TSH 4.04 03/09/2022 10:14 AM        U/A:  Recent Labs     07/25/22  1948   COLORU Yellow   SPECGRAV 1.021   LEUKOCYTESUR NEGATIVE   GLUCOSEU LARGE*       Recent Labs     07/25/22  1735   COVID19 Not Detected       Imaging/Diagonstics:     CT ABDOMEN PELVIS WO CONTRAST Additional Contrast? None    Result Date: 7/25/2022  EXAMINATION: CT OF THE ABDOMEN AND PELVIS WITHOUT CONTRAST 7/25/2022 6:59 pm TECHNIQUE: CT of the abdomen and pelvis was performed without the administration of intravenous contrast. Multiplanar reformatted images are provided for review. Automated exposure control, iterative reconstruction, and/or weight based adjustment of the mA/kV was utilized to reduce the radiation dose to as low as reasonably achievable. COMPARISON: 03/14/2019 HISTORY: ORDERING SYSTEM PROVIDED HISTORY: abd pain TECHNOLOGIST PROVIDED HISTORY: abd pain Decision Support Exception - unselect if not a suspected or confirmed emergency medical condition->Emergency Medical Condition (MA) Reason for Exam: abd pain Additional signs and symptoms: Pt c/o abdominal pain, GERD, nausea FINDINGS: Lower Chest: Patchy, ground-glass opacity in the left lower lobe.   8 mm noncalcified nodule in the right middle lobe, unchanged from prior study. Organs: Prior cholecystectomy. Within the limitations of a noncontrast examination, no acute abnormality within the liver, spleen, pancreas, or adrenal glands. Nonobstructing right nephrolithiasis. No obstructing stone or hydronephrosis. 4.1 cm simple appearing left renal cyst.  There is also a 4.6 cm simple appearing cyst in the lower pole of the left kidney. No further imaging follow-up is required. GI/Bowel: Stomach is partially distended. The small bowel is nondilated. The appendix is normal in caliber. The colon is nondilated. Pelvis: Bladder is partially distended without vesicular stone. The uterus is present. Peritoneum/Retroperitoneum: No ascites or pneumoperitoneum. Atherosclerosis in the nondilated abdominal aorta. Bones/Soft Tissues: Multilevel degenerative changes in the lumbar spine. 1. No acute intra-abdominal abnormality. 2. Faint ground-glass opacity in the left lower lobe, likely infectious or inflammatory. 3. 8 mm right middle lobe nodule is unchanged, suggestive of benignity. 4. Nonobstructing right nephrolithiasis. CT HEAD WO CONTRAST    Result Date: 7/25/2022  EXAMINATION: CT OF THE HEAD WITHOUT CONTRAST  7/25/2022 7:01 pm TECHNIQUE: CT of the head was performed without the administration of intravenous contrast. Automated exposure control, iterative reconstruction, and/or weight based adjustment of the mA/kV was utilized to reduce the radiation dose to as low as reasonably achievable. COMPARISON: 10/31/2019 HISTORY: ORDERING SYSTEM PROVIDED HISTORY: dizziness TECHNOLOGIST PROVIDED HISTORY: dizziness Decision Support Exception - unselect if not a suspected or confirmed emergency medical condition->Emergency Medical Condition (MA) Reason for Exam: dizziness FINDINGS: BRAIN/VENTRICLES: There is no acute intracranial hemorrhage, mass effect or midline shift. No abnormal extra-axial fluid collection.   The gray-white differentiation is maintained without evidence of an acute infarct. There is no evidence of hydrocephalus. ORBITS: The visualized portion of the orbits demonstrate no acute abnormality. SINUSES: The visualized paranasal sinuses and mastoid air cells demonstrate no acute abnormality. SOFT TISSUES/SKULL:  No acute abnormality of the visualized skull or soft tissues. No acute intracranial abnormality. 10/21  Transthoracic Echocardiography Report (TTE) CONCLUSIONS     Summary  Normal LV size and wall thickness. No obvious wall motion abnormality seen. Normal LV systolic function with LVEF >55%. Normal RV size and function. LA and RA appears normal in size. No obvious significant structural valvular abnormality noted. No significant valvular stenosis or regurgitation noted. Normal aortic root dimension. No significant pericardial effusion noted. IVC normal diameter and inspiratory variation indicating normal RA filling  pressure. ASSESSMENT  and  PLAN     Principal Problem:    Left lower lobe pneumonia  Active Problems:    Type 2 diabetes mellitus with diabetic polyneuropathy, with long-term current use of insulin (HCC)    COPD (chronic obstructive pulmonary disease)    Chronic diastolic congestive heart failure (HCC)    Elevated troponin    Prolonged Q-T interval on ECG    Chronic respiratory failure with hypoxia ([facility])    Pneumonia    Coronary artery disease due to lipid rich plaque    Essential hypertension    Morbid (severe) obesity with alveolar hypoventilation ([facility])    Obstructive sleep apnea syndrome    Secondary diabetes mellitus with stage 3 chronic kidney disease (GFR 30-59) (HCC)    CKD (chronic kidney disease) stage 3, GFR 30-59 ml/min    Permanent atrial fibrillation ([facility])  Resolved Problems:    * No resolved hospital problems.  *        Plan:    Typical Atypical Chest Pain  -Troponin  15, repeat normal  -EKG - SR with SA, normal rate and intervals, nonspecific changes unchanged compared to old ekg-Per ER  -Previous stress test Perfusion:  No significant perfusion defects at stress or rest.  Function:  Normal  Risk stratification: LOW   -Previous echocardiogram as above  -Pain/nausea control  -EKG PRN chest pain/arrhythmia      Pneumonia   -IV rocephin and azithromycin antibiotic initiated in ED  --continue rocephin on admission  -switch azithro to doxy given the QT lengthening on ECG   -Check for atypicals   -Sputum C&S pending  -Repeat CXR if no clinical improvement   -Pneumovax before discharge if applicable    COPD   -Likely exacerbated by the pneumonia  -IV Zithromax given in ER , but changed given QTC lengthening   -Respiratory care eval and treat  -Albuterol aerosols PRN  -Sputum C&S - pending specimen  -No need for steroids at this time wheezing is very slight faint and expiratory and given elevated glucose      HIGINIO on CPAP  -RT protocol for house cpap, if patient can not obtain hers     Hypomagnesemia  -Mag level - 1.5  -  2 gm IV replacement ordered  -Recheck BMP with mag at 2200   -monitor magnesium level daily  -replacement protocol ordered     Prolong QTC  -QT/QTC - 382/448 - HR 83   -Previous QT/ QTC- 350/ 390 ( 4/22) Rate 75  - switched azithro to doxy  - hold zofran  - avoid narcotics   -avoid other prolonging medications      Diabetes Mellitus  -Continue home dose insulin  -hold oral hypoglycemics/metformin for now  -POCT ac and hs with sliding scale coverage    CKD  -Patient with history of CKD Stage 3   -Creatinine: 1.08  ---Baseline: 1.3- 1.5, and prior to April 1.0    HTN  -Continue home BP meds  -Monitor VS    Dizziness  -History of A.  Fib  -NOT ON anticoagulation (side of aspirin)  -If not improved IV hydration consider CTA or MRI    July 27    Patient was admitted with shortness of breath and chest pain, discussed with cardiology, troponin elevation was not because of the cardiology care due to type II NSTEMI,  Left lower lobe pneumonia on chest x-ray, acute respiratory failure needing 2 to 4 L of oxygen,  Morbid obesity sleep apnea and obesity hypoventilation syndrome,  Uncontrolled diabetes, sugars running high, manage with insulin,  Hypomagnesemia,  Malnutrition,  Underlying CKD 3,  Multiple comorbid conditions will contribute to her poor outcome, will need inpatient status change.   Patient started complaining of abdominal pain, CT of the abdomen pelvis done, negative,  Lipase mildly elevated,  Possible acute pancreatitis,  Will continue IV fluids,  Repeat lipase tomorrow morning,        IVF: Normal saline at 50  Diet: Diabetic diet  GI ppx: Diet controlled  DVT ppx: Lovenox  Medication Reconciliation: Completed  Code status: Full code  Upcoming Procedure: None  Dispo: Observation    Consultations:     IP CONSULT TO INTERNAL MEDICINE  IP CONSULT TO CARDIOLOGY  IP CONSULT TO RESPIRATORY CARE        Electronically signed by Donna David MD

## 2022-07-27 NOTE — PLAN OF CARE
Safety maintained, no s/s or c/o distress, bed is low/locked, side rails are up x2, bed alarm remains on, call light is within reach, resp therapy given as ordered and PRN  Problem: Discharge Planning  Goal: Discharge to home or other facility with appropriate resources  7/27/2022 0339 by Shawanda Bates RN  Outcome: Progressing Towards Goal  7/26/2022 1708 by Mc Mejia RN  Outcome: Progressing Towards Goal  Flowsheets (Taken 7/26/2022 0855)  Discharge to home or other facility with appropriate resources: Identify barriers to discharge with patient and caregiver     Problem: Skin/Tissue Integrity  Goal: Absence of new skin breakdown  Description: 1. Monitor for areas of redness and/or skin breakdown  2. Assess vascular access sites hourly  3. Every 4-6 hours minimum:  Change oxygen saturation probe site  4. Every 4-6 hours:  If on nasal continuous positive airway pressure, respiratory therapy assess nares and determine need for appliance change or resting period.   7/27/2022 0339 by Shawanda Bates RN  Outcome: Progressing Towards Goal  7/26/2022 1708 by Mc Mejia RN  Outcome: Progressing Towards Goal     Problem: Safety - Adult  Goal: Free from fall injury  7/27/2022 0339 by Shawanda Bates RN  Outcome: Progressing Towards Goal  7/26/2022 1708 by Mc Mejia RN  Outcome: Progressing Towards Goal     Problem: ABCDS Injury Assessment  Goal: Absence of physical injury  7/27/2022 0339 by Shawanda Bates RN  Outcome: Progressing Towards Goal  7/26/2022 1708 by Mc Mejia RN  Outcome: Progressing Towards Goal     Problem: Chronic Conditions and Co-morbidities  Goal: Patient's chronic conditions and co-morbidity symptoms are monitored and maintained or improved  7/27/2022 0339 by Shawanda Bates RN  Outcome: Progressing Towards Goal  7/26/2022 1708 by Mc Mejia RN  Outcome: Progressing Towards Goal  Flowsheets (Taken 7/26/2022 0855)  Care Plan - Patient's Chronic Conditions and Co-Morbidity Symptoms are Monitored and Maintained or Improved: Monitor and assess patient's chronic conditions and comorbid symptoms for stability, deterioration, or improvement

## 2022-07-27 NOTE — FLOWSHEET NOTE
[] [facility]) - [facility] & Therapy  [address]  [place]: [phone]   F: [phone]     Physical Therapy Cancel/No Show note    Date: 2022  Patient: Mercedes Zhang  : 1950  MRN: [id number]    Visit Count:   Cancels/No Shows to date:     For today's appointment patient:    [x]  Cancelled    [] Rescheduled appointment    [] No-show     Reason given by patient:    []  Patient ill    []  Conflicting appointment    [] No transportation      [] Conflict with work    [] No reason given    [] Weather related    [] COVID-19    [x] Other:      Comments: Provider cancel entered today as patient is still admitted to hospital. Primary PT spoke with patient's son, and reported that she is going to do home health PT upon discharge from hospital.        [] Next appointment was confirmed    Electronically signed by: Fabiana Wahl PTA

## 2022-07-27 NOTE — PROGRESS NOTES
Occupational Therapy  Facility/Department: [facility] PROGRESSIVE CARE  Daily Treatment Note  NAME: Chester Hartmann  : 1950  MRN: [id number]    Date of Service: 2022    Discharge Recommendations:  Home with assist PRN, Home with Home health OT  OT Equipment Recommendations  Other: TBD      Patient Diagnosis(es): The primary encounter diagnosis was Dizziness. Diagnoses of Hypomagnesemia syndrome, Pneumonia of left lower lobe due to infectious organism, Type 2 diabetes mellitus with diabetic polyneuropathy, with long-term current use of insulin ([address] Other chest pain, Hyperlipidemia with target LDL less than 100, and Abdominal spasms were also pertinent to this visit. Assessment    Activity Tolerance: Patient tolerated treatment well;Treatment limited secondary to medical complications (elevated BP)  Discharge Recommendations: Home with assist PRN;Home with Home health OT  Other: TBD      Plan   Plan  Times per Week: 4-5  Current Treatment Recommendations: Strengthening;Balance training;Functional mobility training; Endurance training; Safety education & training;Patient/Caregiver education & training;Equipment evaluation, education, & procurement;Self-Care / ADL     Restrictions       Subjective   Subjective  Subjective: \"I'll try. \" Pt agreeable to OT at this time. RN Gerardo Zarate ok's tx. Orientation  Overall Orientation Status: Within Functional Limits  Cognition  Overall Cognitive Status: WFL        Objective    Vitals     Bed Mobility Training  Supine to Sit: Stand-by assistance  Sit to Supine: Stand-by assistance  Scooting: Stand-by assistance  Balance  Sitting - Static: Normal  Sitting - Dynamic: Occasional     ADL  Feeding: Setup  Grooming: Setup  UE Bathing: Supervision  LE Bathing: Moderate assistance  UE Dressing: Supervision  LE Dressing: Moderate assistance  Toileting: Minimal assistance  Additional Comments: ADL scores based on skilled observation and clinical reasoning, unless otherwise noted. Pt is currently limited due to shortness of breath, generalized weakness, and limited mobility impacting her independence with self care. OT Exercises  Exercise Treatment: QUINTANA faciliatated pt in BUE exercises for increased strength and activity tolerance. Pt completes elbow flexion/extension, shoulder flexion and horizontal abduction/adduction with 1# free weight X10 reps each before reporting dizziness. SPO2 assessed at 94% and BP assessed at 120/104. After laying back down, BP reassessed at 126/61. RN [facility] notified.      Safety Devices  Type of Devices: Left in bed;Bed alarm in place;Call light within reach;Nurse notified     Patient Education  Education Given To: Patient  Education Provided: Role of Therapy;Plan of Care;Energy Conservation  Education Method: Verbal  Barriers to Learning: None  Education Outcome: Verbalized understanding;Continued education needed    Goals  Short Term Goals  Time Frame for Short term goals: By discharge  Short Term Goal 1: Pt will perform BADLs mod I and Good safety  Short Term Goal 2: Pt will V/D use of appropriate adaptive equipment/adaptive strategies to increase independence with self care tasks, 100% of the time  Short Term Goal 3: Pt will perform transfers/functional mobility mod I, using least restrictive device, during self care tasks  Short Term Goal 4: Pt will tolerate standing 10+ minutes, UE support as needed, mod I during self care/functional activities  Short Term Goal 5: Pt will V/D use of EC/WS techniques that are applicable to her daily routine  Short Term Goal 6: Pt will actively participate in 15+ minutes of therapeutic exercise/functional activity to promote safety and independence with self care and mobility       Therapy Time   Individual Concurrent Group Co-treatment   Time In 1014         Time Out 1040         Minutes 26 07/27/22 1103   AM-PAC Daily Activity Inpatient    How much help for putting on and taking off regular lower body clothing? 2   How much help for Bathing? 2   How much help for Toileting? 3   How much help for putting on and taking off regular upper body clothing? 3   How much help for taking care of personal grooming? 3   How much help for eating meals?  3   AM-PAC Inpatient Daily Activity Raw Score 16   AM-[facility] Inpatient ADL T-Scale Score  35.96   ADL Inpatient CMS 0-100% Score 53.32   ADL Inpatient CMS G-Code Modifier  CK     MARIANO Cai/SANDRA

## 2022-07-27 NOTE — PROGRESS NOTES
CLINICAL PHARMACY NOTE: MEDS TO BEDS    Total # of Prescriptions Filled: 4   The following medications were delivered to the patient:  Dicyclomine HCL 10mg  Doxycyline Monohydrate 100mg  Furosemide 20mg  Symbicort 160-4.5mcg/act     Additional Documentation:  Delivered Medication to Patients Room     Refill(s) Too Soon + Profiled Rx(s)   - Metformin: 8/12    OTC(s) Not Covered + Profiled Rx(s)   - Melatonin   - Vitamin B12    Patient has OneTouch meter at home - stated that she did not need new Rx for test strips - profiled

## 2022-07-27 NOTE — PROGRESS NOTES
Secure message sent via [employer] to [employer], CNP, that pt's HS glucose check was 408, that her long acting insulin home medication was not reordered, and that pt is requesting bentyl for she is having abdominal cramping. CNP stated she will place orders.

## 2022-07-27 NOTE — FLOWSHEET NOTE
07/27/22 5717   Encounter Summary   Encounter Overview/Reason  Attempted Encounter   Service Provided For: Patient not available  (patient with PCT)

## 2022-07-27 NOTE — CARE COORDINATION
ONGOING DISCHARGE PLAN:    Patient is alert and oriented x4. Spoke with patient regarding discharge plan and patient confirms that plan is still to discharge to home with [facility]  Patient remains on iv atb  Possible discharge to home today        Will continue to follow for additional discharge needs.     Electronically signed by Fan Woodruff RN on 7/27/2022 at 11:46 AM

## 2022-07-28 VITALS
HEIGHT: 62 IN | RESPIRATION RATE: 20 BRPM | BODY MASS INDEX: 44.95 KG/M2 | HEART RATE: 73 BPM | SYSTOLIC BLOOD PRESSURE: 103 MMHG | TEMPERATURE: 98.1 F | DIASTOLIC BLOOD PRESSURE: 40 MMHG | WEIGHT: 244.27 LBS | OXYGEN SATURATION: 95 %

## 2022-07-28 LAB
ABSOLUTE EOS #: 0.2 K/UL (ref 0–0.4)
ABSOLUTE LYMPH #: 2 K/UL (ref 1–4.8)
ABSOLUTE MONO #: 0.6 K/UL (ref 0.1–1.3)
ALBUMIN SERPL-MCNC: 3.5 G/DL (ref 3.5–5.2)
ALP BLD-CCNC: 59 U/L (ref 35–104)
ALT SERPL-CCNC: 32 U/L (ref 5–33)
ANION GAP SERPL CALCULATED.3IONS-SCNC: 6 MMOL/L (ref 9–17)
AST SERPL-CCNC: 26 U/L
BASOPHILS # BLD: 1 % (ref 0–2)
BASOPHILS ABSOLUTE: 0 K/UL (ref 0–0.2)
BILIRUB SERPL-MCNC: <0.15 MG/DL (ref 0.3–1.2)
BUN BLDV-MCNC: 19 MG/DL (ref 8–23)
CALCIUM SERPL-MCNC: 9.3 MG/DL (ref 8.6–10.4)
CHLORIDE BLD-SCNC: 101 MMOL/L (ref 98–107)
CO2: 32 MMOL/L (ref 20–31)
CREAT SERPL-MCNC: 0.92 MG/DL (ref 0.5–0.9)
EOSINOPHILS RELATIVE PERCENT: 4 % (ref 0–4)
GFR AFRICAN AMERICAN: >60 ML/MIN
GFR NON-AFRICAN AMERICAN: >60 ML/MIN
GFR SERPL CREATININE-BSD FRML MDRD: ABNORMAL ML/MIN/{1.73_M2}
GLUCOSE BLD-MCNC: 110 MG/DL (ref 65–105)
GLUCOSE BLD-MCNC: 204 MG/DL (ref 70–99)
GLUCOSE BLD-MCNC: 238 MG/DL (ref 65–105)
HCT VFR BLD CALC: 37.1 % (ref 36–46)
HEMOGLOBIN: 12.4 G/DL (ref 12–16)
LIPASE: 72 U/L (ref 13–60)
LYMPHOCYTES # BLD: 32 % (ref 24–44)
MCH RBC QN AUTO: 30.9 PG (ref 26–34)
MCHC RBC AUTO-ENTMCNC: 33.5 G/DL (ref 31–37)
MCV RBC AUTO: 92.2 FL (ref 80–100)
MONOCYTES # BLD: 10 % (ref 1–7)
PDW BLD-RTO: 13.6 % (ref 11.5–14.9)
PLATELET # BLD: 160 K/UL (ref 150–450)
PMV BLD AUTO: 9.8 FL (ref 6–12)
POTASSIUM SERPL-SCNC: 4.5 MMOL/L (ref 3.7–5.3)
RBC # BLD: 4.03 M/UL (ref 4–5.2)
SEG NEUTROPHILS: 53 % (ref 36–66)
SEGMENTED NEUTROPHILS ABSOLUTE COUNT: 3.3 K/UL (ref 1.3–9.1)
SODIUM BLD-SCNC: 139 MMOL/L (ref 135–144)
TOTAL PROTEIN: 6.1 G/DL (ref 6.4–8.3)
WBC # BLD: 6.2 K/UL (ref 3.5–11)

## 2022-07-28 PROCEDURE — 97116 GAIT TRAINING THERAPY: CPT

## 2022-07-28 PROCEDURE — 2700000000 HC OXYGEN THERAPY PER DAY

## 2022-07-28 PROCEDURE — 2580000003 HC RX 258: Performed by: NURSE PRACTITIONER

## 2022-07-28 PROCEDURE — 94640 AIRWAY INHALATION TREATMENT: CPT

## 2022-07-28 PROCEDURE — 6370000000 HC RX 637 (ALT 250 FOR IP): Performed by: NURSE PRACTITIONER

## 2022-07-28 PROCEDURE — 83690 ASSAY OF LIPASE: CPT

## 2022-07-28 PROCEDURE — 6360000002 HC RX W HCPCS: Performed by: NURSE PRACTITIONER

## 2022-07-28 PROCEDURE — 80053 COMPREHEN METABOLIC PANEL: CPT

## 2022-07-28 PROCEDURE — 99239 HOSP IP/OBS DSCHRG MGMT >30: CPT | Performed by: INTERNAL MEDICINE

## 2022-07-28 PROCEDURE — 94660 CPAP INITIATION&MGMT: CPT

## 2022-07-28 PROCEDURE — 94761 N-INVAS EAR/PLS OXIMETRY MLT: CPT

## 2022-07-28 PROCEDURE — 85025 COMPLETE CBC W/AUTO DIFF WBC: CPT

## 2022-07-28 PROCEDURE — 82947 ASSAY GLUCOSE BLOOD QUANT: CPT

## 2022-07-28 PROCEDURE — 36415 COLL VENOUS BLD VENIPUNCTURE: CPT

## 2022-07-28 PROCEDURE — 6370000000 HC RX 637 (ALT 250 FOR IP): Performed by: INTERNAL MEDICINE

## 2022-07-28 PROCEDURE — 97110 THERAPEUTIC EXERCISES: CPT

## 2022-07-28 RX ADMIN — ATORVASTATIN CALCIUM 40 MG: 40 TABLET, FILM COATED ORAL at 08:24

## 2022-07-28 RX ADMIN — SODIUM CHLORIDE, PRESERVATIVE FREE 10 ML: 5 INJECTION INTRAVENOUS at 08:24

## 2022-07-28 RX ADMIN — BENZONATATE 200 MG: 200 CAPSULE ORAL at 12:26

## 2022-07-28 RX ADMIN — INSULIN GLARGINE 55 UNITS: 100 INJECTION, SOLUTION SUBCUTANEOUS at 08:25

## 2022-07-28 RX ADMIN — DOXYCYCLINE 100 MG: 100 CAPSULE ORAL at 08:24

## 2022-07-28 RX ADMIN — CLOPIDOGREL BISULFATE 75 MG: 75 TABLET ORAL at 08:24

## 2022-07-28 RX ADMIN — ENOXAPARIN SODIUM 30 MG: 100 INJECTION SUBCUTANEOUS at 08:24

## 2022-07-28 RX ADMIN — ALBUTEROL SULFATE 2.5 MG: 2.5 SOLUTION RESPIRATORY (INHALATION) at 08:41

## 2022-07-28 RX ADMIN — LOSARTAN POTASSIUM 25 MG: 25 TABLET, FILM COATED ORAL at 08:24

## 2022-07-28 RX ADMIN — BENZONATATE 200 MG: 200 CAPSULE ORAL at 08:24

## 2022-07-28 RX ADMIN — INSULIN LISPRO 2 UNITS: 100 INJECTION, SOLUTION INTRAVENOUS; SUBCUTANEOUS at 12:26

## 2022-07-28 RX ADMIN — CARVEDILOL 6.25 MG: 6.25 TABLET, FILM COATED ORAL at 08:24

## 2022-07-28 ASSESSMENT — PAIN DESCRIPTION - LOCATION: LOCATION: ABDOMEN

## 2022-07-28 ASSESSMENT — PAIN SCALES - GENERAL: PAINLEVEL_OUTOF10: 6

## 2022-07-28 ASSESSMENT — PAIN DESCRIPTION - ORIENTATION: ORIENTATION: LEFT

## 2022-07-28 NOTE — PROGRESS NOTES
[address]   OCCUPATIONAL THERAPY MISSED TREATMENT NOTE   INPATIENT   Date: 22  Patient Name: Carina Corbin       Room:   MRN: [id number]   Account #: [de-identified]    : 1950  (66 y.o.)  Gender: female   Referring Practitioner: Laurel Paget, APRN - CNP  Diagnosis: Left lower lobe pneumonia             REASON FOR MISSED TREATMENT:  Pt being DC'd at this time.       MELIA Victor

## 2022-07-28 NOTE — PLAN OF CARE
Problem: Discharge Planning  Goal: Discharge to home or other facility with appropriate resources  7/28/2022 1338 by Neri Davila RN  Outcome: Completed  7/28/2022 0348 by Nadiya Carrington RN  Outcome: Progressing     Problem: Skin/Tissue Integrity  Goal: Absence of new skin breakdown  Description: 1. Monitor for areas of redness and/or skin breakdown  2. Assess vascular access sites hourly  3. Every 4-6 hours minimum:  Change oxygen saturation probe site  4. Every 4-6 hours:  If on nasal continuous positive airway pressure, respiratory therapy assess nares and determine need for appliance change or resting period.   7/28/2022 1338 by Neri Davila RN  Outcome: Completed  7/28/2022 0348 by Nadiya Carrington RN  Outcome: Progressing     Problem: Safety - Adult  Goal: Free from fall injury  7/28/2022 1338 by Neri Davila RN  Outcome: Completed  7/28/2022 0348 by Nadiya Carrington RN  Outcome: Progressing     Problem: ABCDS Injury Assessment  Goal: Absence of physical injury  7/28/2022 1338 by eNri Davila RN  Outcome: Completed  7/28/2022 0348 by Nadiya Carrington RN  Outcome: Progressing     Problem: Chronic Conditions and Co-morbidities  Goal: Patient's chronic conditions and co-morbidity symptoms are monitored and maintained or improved  7/28/2022 1338 by Neri Davila RN  Outcome: Completed  7/28/2022 0348 by Nadiya Carrington RN  Outcome: Progressing     Problem: Pain  Goal: Verbalizes/displays adequate comfort level or baseline comfort level  7/28/2022 1338 by Neri Davila RN  Outcome: Completed  7/28/2022 0348 by Nadiya Carrington RN  Outcome: Progressing

## 2022-07-28 NOTE — PLAN OF CARE
Safety maintained, call light is within reach, no s/s or c/o distress, bed is low/locked, side rails are up x2, bed alarm remains on  Problem: Discharge Planning  Goal: Discharge to home or other facility with appropriate resources  7/28/2022 0348 by Grace Miles RN  Outcome: Progressing  7/27/2022 1656 by Luke Madrid RN  Outcome: Progressing  Flowsheets (Taken 7/27/2022 6174)  Discharge to home or other facility with appropriate resources: Identify barriers to discharge with patient and caregiver     Problem: Skin/Tissue Integrity  Goal: Absence of new skin breakdown  Description: 1. Monitor for areas of redness and/or skin breakdown  2. Assess vascular access sites hourly  3. Every 4-6 hours minimum:  Change oxygen saturation probe site  4. Every 4-6 hours:  If on nasal continuous positive airway pressure, respiratory therapy assess nares and determine need for appliance change or resting period.   7/28/2022 0348 by Grace Miles RN  Outcome: Progressing  7/27/2022 1656 by Luke Madrid RN  Outcome: Progressing     Problem: Safety - Adult  Goal: Free from fall injury  7/28/2022 0348 by Grace Miles RN  Outcome: Progressing  7/27/2022 1656 by Luke Madrid RN  Outcome: Progressing  Flowsheets (Taken 7/27/2022 1040)  Free From Fall Injury: Instruct family/caregiver on patient safety     Problem: ABCDS Injury Assessment  Goal: Absence of physical injury  7/28/2022 0348 by Grace Miles RN  Outcome: Progressing  7/27/2022 1656 by Luke Madrid RN  Outcome: Progressing  Flowsheets (Taken 7/27/2022 1040)  Absence of Physical Injury: Implement safety measures based on patient assessment     Problem: Chronic Conditions and Co-morbidities  Goal: Patient's chronic conditions and co-morbidity symptoms are monitored and maintained or improved  7/28/2022 0348 by Grace Miles RN  Outcome: Progressing  7/27/2022 1656 by Luke Madrid RN  Outcome: Progressing  Flowsheets (Taken 7/27/2022 8216)  Care Plan - Patient's Chronic Conditions and Co-Morbidity Symptoms are Monitored and Maintained or Improved: Monitor and assess patient's chronic conditions and comorbid symptoms for stability, deterioration, or improvement     Problem: Pain  Goal: Verbalizes/displays adequate comfort level or baseline comfort level  7/28/2022 0348 by Victor Manuel Le RN  Outcome: Progressing  7/27/2022 1656 by Isela Burns RN  Outcome: Progressing

## 2022-07-28 NOTE — PROGRESS NOTES
I.  Short term goal 2: Pt to demo transfers MOD I. Short term goal 3: Pt to amb 48' MOD I. Short term goal 4: Pt to demo good technique for HEP. Short term goal 5: Pt to tolerate 30-45 minute PT tx session to improve endurance and jon. Patient Goals   Patient goals : To go home    Education  Patient Education  Education Given To: Patient  Education Provided: Role of Therapy;Plan of Care;Precautions; Fall Prevention Strategies  Education Method: Verbal;Demonstration  Barriers to Learning: Hearing  Education Outcome: Continued education needed    Therapy Time   Individual Concurrent Group Co-treatment   Time In 1105         Time Out 1132         Minutes [address] [place] [address]

## 2022-07-28 NOTE — FLOWSHEET NOTE
Patient discharged to home per orders. IV x 2 discontinued intact. Discharge instructions reviewed written and verbal.  Medications delivered through meds to beds. Patient states all belongings are present. Awaiting transportation.

## 2022-08-08 NOTE — DISCHARGE SUMMARY
[address] Internal Medicine  Kee Velez MD; Vincenzo Johnson MD; Derrick Myers MD; MD Shalini Schmid MD; Mariana Abarca MD      [facility]  [facility]    Discharge Summary     Patient ID: Mayelin Dang  :  1950   MRN: [id number]     ACCOUNT:  [de-identified]   Patient's PCP: VERONIKA Elmore CNP  Admit Date: 2022   Discharge Date: 22    Length of Stay: 2  Code Status:  Prior  Admitting Physician: Mariana Abarca MD  Discharge Physician: Mariana Abarca MD     Active Discharge Diagnoses:     Hospital Problem Lists:  Principal Problem:    Left lower lobe pneumonia  Active Problems:    Type 2 diabetes mellitus with diabetic polyneuropathy, with long-term current use of insulin (HCC)    COPD (chronic obstructive pulmonary disease)    Chronic diastolic congestive heart failure (HCC)    Elevated troponin    Prolonged Q-T interval on ECG    Chronic respiratory failure with hypoxia ([address]    Pneumonia    Coronary artery disease due to lipid rich plaque    Essential hypertension    Morbid (severe) obesity with alveolar hypoventilation (HCC)    Obstructive sleep apnea syndrome    Secondary diabetes mellitus with stage 3 chronic kidney disease (GFR 30-59) (HCC)    CKD (chronic kidney disease) stage 3, GFR 30-59 ml/min    Permanent atrial fibrillation ([address]  Resolved Problems:    * No resolved hospital problems. *      Admission Condition:  serious     Discharged Condition: stable    Hospital Stay:     Hospital Course:  Mayelin Dang is a 66 y.o. female who was admitted for the management of   Left lower lobe pneumonia , presented to ER with Dizziness (Was at physical therapy today, felt dizzy.   Elevated BS)    The patient is a 66 y.o. female, with a history of AFIB-, CKD-3, CHF, COPD, DM HTN, CAD, HIGINIO who presents with concerns of hyperglycemia with blood sugars into the 500's, left lateral chest pain. Patient states that she has been having increased blood sugars over the past few days and today the blood sugars have been elevated into the 500's and she was taking her insulin without significant decrease in glucose. Patient admits to associated left chest pain shortness of breath, subjective low grade fevers and chills. Further questioning, the patient located the chest pain to the left lateral chest wall under the axilla. The pain is worse with respiration, but baseline constant, there is no radiation, there is associated  shortness of breath and nausea. Patient admits to increase use of her home oxygen from as needed to nearly all day, and increase uses of nebulizer.       Patient was admitted with shortness of breath and chest pain, discussed with cardiology, troponin elevation was not because of the cardiology care due to type II NSTEMI,  Left lower lobe pneumonia on chest x-ray, acute respiratory failure needing 2 to 4 L of oxygen,  Morbid obesity sleep apnea and obesity hypoventilation syndrome,  Uncontrolled diabetes, sugars running high, manage with insulin,  Hypomagnesemia,  Malnutrition,  Underlying CKD 3,  Multiple comorbid conditions will contribute to her poor outcome,  Patient started complaining of abdominal pain, CT of the abdomen pelvis done, negative,  Lipase mildly elevated,  Possible acute pancreatitis,  Will continue IV fluids,  Lipase was lower ,   Abd pain all better   On home dose oxygen     Review of system:  Denies any nausea vomiting fever chills,  Denies any headaches or blurred vision,  Denies any chest pain shortness of pain orthopnea,   Denies any cough phlegm hemoptysis,  Denies any abdominal pain diarrhea constipation,  Denies any tingling tingling numbness weakness of arms or legs,   Skin no rash,    On examination,  Alert awake oriented x3,  S1-S2 present,  CTA bilateral,  Abdomen soft nontender nondistended bowel sounds present   Extremity no edema no calf tenderness,,  Skin no rash  CNS no focal neurological deficits        Significant therapeutic interventions:     Significant Diagnostic Studies:   Labs / Micro:  CBC:   Lab Results   Component Value Date/Time    WBC 6.2 07/28/2022 05:55 AM    RBC 4.03 07/28/2022 05:55 AM    RBC 3.37 03/08/2012 06:13 AM    HGB 12.4 07/28/2022 05:55 AM    HCT 37.1 07/28/2022 05:55 AM    MCV 92.2 07/28/2022 05:55 AM    MCH 30.9 07/28/2022 05:55 AM    MCHC 33.5 07/28/2022 05:55 AM    RDW 13.6 07/28/2022 05:55 AM     07/28/2022 05:55 AM     03/08/2012 06:13 AM     BMP:    Lab Results   Component Value Date/Time    GLUCOSE 204 07/28/2022 05:55 AM    GLUCOSE 123 03/08/2012 06:13 AM     07/28/2022 05:55 AM    K 4.5 07/28/2022 05:55 AM     07/28/2022 05:55 AM    CO2 32 07/28/2022 05:55 AM    ANIONGAP 6 07/28/2022 05:55 AM    BUN 19 07/28/2022 05:55 AM    CREATININE 0.92 07/28/2022 05:55 AM    BUNCRER NOT REPORTED 02/01/2022 03:10 PM    CALCIUM 9.3 07/28/2022 05:55 AM    LABGLOM >60 07/28/2022 05:55 AM    GFRAA >60 07/28/2022 05:55 AM    GFR      07/28/2022 05:55 AM     HFP:    Lab Results   Component Value Date/Time    PROT 6.1 07/28/2022 05:55 AM     CMP:    Lab Results   Component Value Date/Time    GLUCOSE 204 07/28/2022 05:55 AM    GLUCOSE 123 03/08/2012 06:13 AM     07/28/2022 05:55 AM    K 4.5 07/28/2022 05:55 AM     07/28/2022 05:55 AM    CO2 32 07/28/2022 05:55 AM    BUN 19 07/28/2022 05:55 AM    CREATININE 0.92 07/28/2022 05:55 AM    ANIONGAP 6 07/28/2022 05:55 AM    ALKPHOS 59 07/28/2022 05:55 AM    ALT 32 07/28/2022 05:55 AM    AST 26 07/28/2022 05:55 AM    BILITOT <0.15 07/28/2022 05:55 AM    LABALBU 3.5 07/28/2022 05:55 AM    LABALBU 3.5 11/22/2011 04:32 AM    ALBUMIN 1.4 04/28/2022 05:40 AM    LABGLOM >60 07/28/2022 05:55 AM    GFRAA >60 07/28/2022 05:55 AM    GFR      07/28/2022 05:55 AM    PROT 6.1 07/28/2022 05:55 AM    CALCIUM 9.3 07/28/2022 05:55 AM     PT/INR:    Lab Results Component Value Date/Time    PROTIME 10.5 10/31/2019 08:36 PM    PROTIME 11.0 11/17/2011 11:25 PM    INR 1.0 10/31/2019 08:36 PM     PTT:   Lab Results   Component Value Date/Time    APTT 55.8 06/08/2016 11:55 AM     FLP:    Lab Results   Component Value Date/Time    CHOL 211 03/09/2022 10:14 AM    TRIG 202 03/09/2022 10:14 AM    HDL 43 03/09/2022 10:14 AM     U/A:    Lab Results   Component Value Date/Time    COLORU Yellow 07/25/2022 07:48 PM    TURBIDITY Clear 07/25/2022 07:48 PM    SPECGRAV 1.021 07/25/2022 07:48 PM    HGBUR NEGATIVE 07/25/2022 07:48 PM    PHUR 5.0 07/25/2022 07:48 PM    PROTEINU NEGATIVE 07/25/2022 07:48 PM    GLUCOSEU LARGE 07/25/2022 07:48 PM    GLUCOSEU NEGATIVE 03/06/2012 09:00 PM    KETUA NEGATIVE 07/25/2022 07:48 PM    BILIRUBINUR NEGATIVE 07/25/2022 07:48 PM    BILIRUBINUR negative 08/17/2018 10:30 AM    BILIRUBINUR NEGATIVE  Verified by ictotest. 03/06/2012 09:00 PM    UROBILINOGEN Normal 07/25/2022 07:48 PM    NITRU NEGATIVE 07/25/2022 07:48 PM    LEUKOCYTESUR NEGATIVE 07/25/2022 07:48 PM     TSH:    Lab Results   Component Value Date/Time    TSH 4.04 03/09/2022 10:14 AM          Radiology:  No results found. Consultations:    Consults:     Final Specialist Recommendations/Findings:   IP CONSULT TO INTERNAL MEDICINE  IP CONSULT TO CARDIOLOGY  IP CONSULT TO RESPIRATORY CARE      The patient was seen and examined on day of discharge and this discharge summary is in conjunction with any daily progress note from day of discharge.     Discharge plan:     Disposition: Home    Physician Follow Up:     VERONIKA Reddy - CNP  [place] [address] [facility]  [phone]    Call in 1 week(s)  hospital follow up    Clarissa Keane MD  [address] [place] [address], [address]  [address]  [phone]    Schedule an appointment as soon as possible for a visit in 4 week(s)  hospital follow up       Requiring Further Evaluation/Follow Up POST HOSPITALIZATION/Incidental Findings:     Diet: diabetic diet    Activity: As tolerated    Instructions to Patient:     Discharge Medications:      Medication List        CHANGE how you take these medications      budesonide-formoterol 160-4.5 MCG/ACT Aero  Commonly known as: Symbicort  Inhale 2 puffs into the lungs in the morning and 2 puffs before bedtime. As needed for sob. What changed:   how to take this  when to take this     metFORMIN 1000 MG tablet  Commonly known as: GLUCOPHAGE  Take 1 tablet by mouth in the morning and 1 tablet in the evening. Take with meals. What changed:   See the new instructions. Another medication with the same name was removed. Continue taking this medication, and follow the directions you see here. * True Metrix Blood Glucose Test strip  Generic drug: blood glucose test strips  THREE TIMES A DAY  What changed: Another medication with the same name was changed. Make sure you understand how and when to take each. * OneTouch Ultra strip  Generic drug: blood glucose test strips  TEST TWO (2) TO THREE (3) TIMES A DAY & AS NEEDED FOR SYMPTOMS OF IRREGULAR BLOOD GLUCOSE  What changed: Another medication with the same name was changed. Make sure you understand how and when to take each. * OneTouch Ultra strip  Generic drug: blood glucose test strips  As needed. What changed: See the new instructions. * This list has 3 medication(s) that are the same as other medications prescribed for you. Read the directions carefully, and ask your doctor or other care provider to review them with you.                 CONTINUE taking these medications      albuterol (2.5 MG/3ML) 0.083% nebulizer solution  Commonly known as: PROVENTIL  Take 3 mLs by nebulization every 6 hours as needed for Wheezing     atorvastatin 40 MG tablet  Commonly known as: Lipitor  Take 1 tablet by mouth daily     Basaglar KwikPen 100 UNIT/ML injection pen  Generic drug: insulin glargine  Inject 55 Units into the skin 2 times daily     carvedilol 3.125 MG tablet  Commonly known as: COREG  TAKE 1 TAB BY MOUTH TWICE A DAY ( IN THE MORNING AND BEDTIME )     CertaVite/Antioxidants Tabs  TAKE 1 TABLET BY MOUTH ONCE DAILY     clopidogrel 75 MG tablet  Commonly known as: PLAVIX  TAKE 1 TAB BY MOUTH ONCE A DAY ( IN THE MORNING ) -THIS MEDICINE MAY BE TAKENWITH OR WITHOUT FOOD     dicyclomine 10 MG capsule  Commonly known as: BENTYL  TAKE 1 CAPSULE BY MOUTH TWICE A DAY AS NEEDED FOR ABDOMINAL SPASMS      MG capsule  Generic drug: docusate sodium  TAKE 1 CAPSULE BY MOUTH TWICE A DAY     escitalopram 10 MG tablet  Commonly known as: LEXAPRO  Take 1 tablet by mouth daily     fenofibrate 160 MG tablet  Commonly known as: TRIGLIDE  TAKE 1 TABLET BY MOUTH DAILY     FeroSul 325 (65 Fe) MG tablet  Generic drug: ferrous sulfate  TAKE 1 TAB BY MOUTH IN THE MORNING     furosemide 20 MG tablet  Commonly known as: LASIX  TAKE 1 TABLET BY MOUTH ONCE DAILY AS NEEDED     isosorbide dinitrate 30 MG tablet  Commonly known as: ISORDIL  TAKE 1 TABLET BY MOUTH ONCE DAILY     losartan 25 MG tablet  Commonly known as: COZAAR  TAKE 1 TABLET BY MOUTH ONCE DAILY     magnesium oxide 400 MG tablet  Commonly known as: MAG-OX     Melatonin 10 MG Tabs  Take 10 mg by mouth nightly     NovoLOG FlexPen 100 UNIT/ML injection pen  Generic drug: insulin aspart  IF<139 NO INSULIN; 140-199-2 UN;200-249-4 UN;250-299-6 UN;300-349-8 UN;350-400=10 UN;ABOVE 400-12 UNIT(S)     oxybutynin 5 MG extended release tablet  Commonly known as: DITROPAN-XL  TAKE 1 TABLET BY MOUTH DAILY     oxyCODONE-acetaminophen 5-325 MG per tablet  Commonly known as: Percocet  Take 1 tablet by mouth every 8 hours as needed for Pain for up to 30 days. Intended supply: 30 days.      OXYGEN     pantoprazole 40 MG tablet  Commonly known as: PROTONIX  TAKE 1 TABLET BY MOUTH TWICE A DAY     SM Aspirin Adult Low Strength 81 MG EC tablet  Generic drug: aspirin  TAKE 1 TABLET BY MOUTH ONCE DAILY     topiramate 100 MG tablet  Commonly known as: TOPAMAX  TAKE 1 TABLET BY MOUTH NIGHTLY     Trulicity 1.5 OU/0.1LC Sopn  Generic drug: Dulaglutide  Inject 1.5 mg into the skin once a week     vitamin B-12 100 MCG tablet  Commonly known as: CYANOCOBALAMIN  take half a tablet BY MOUTH ONCE DAILY            ASK your doctor about these medications      doxycycline monohydrate 100 MG capsule  Commonly known as: MONODOX  Take 1 capsule by mouth in the morning and 1 capsule before bedtime. Do all this for 11 doses. Ask about: Should I take this medication? Where to Get Your Medications        These medications were sent to [facility] [address]  [facility], [address] [facility] [address]      Phone: [phone]   budesonide-formoterol 160-4.5 MCG/ACT Aero  dicyclomine 10 MG capsule  doxycycline monohydrate 100 MG capsule  furosemide 20 MG tablet  Melatonin 10 MG Tabs  metFORMIN 1000 MG tablet  OneTouch Ultra strip  OneTouch Ultra strip  True Metrix Blood Glucose Test strip  vitamin B-12 100 MCG tablet         No discharge procedures on file. Time Spent on discharge is  33 mins in patient examination, evaluation, counseling as well as medication reconciliation, prescriptions for required medications, discharge plan and follow up. Electronically signed by   Bib Minor MD  8/7/2022  11:13 PM      Thank you VERONIKA Saxena - CNP for the opportunity to be involved in this patient's care. Please note that this chart was generated using voice recognition Dragon dictation software. Although every effort was made to ensure the accuracy of this automated transcription, some errors in transcription may have occurred.

## 2022-08-10 ENCOUNTER — HOSPITAL ENCOUNTER (OUTPATIENT)
Dept: PAIN MANAGEMENT | Age: 72
Discharge: HOME | End: 2022-08-10
Payer: COMMERCIAL

## 2022-08-10 VITALS
OXYGEN SATURATION: 89 % | DIASTOLIC BLOOD PRESSURE: 84 MMHG | RESPIRATION RATE: 20 BRPM | TEMPERATURE: 97.3 F | HEART RATE: 81 BPM | SYSTOLIC BLOOD PRESSURE: 137 MMHG

## 2022-08-10 DIAGNOSIS — M51.36 DDD (DEGENERATIVE DISC DISEASE), LUMBAR: ICD-10-CM

## 2022-08-10 DIAGNOSIS — G89.29 CHRONIC BILATERAL LOW BACK PAIN WITH BILATERAL SCIATICA: Primary | Chronic | ICD-10-CM

## 2022-08-10 DIAGNOSIS — M54.16 LUMBAR RADICULOPATHY, CHRONIC: ICD-10-CM

## 2022-08-10 DIAGNOSIS — M47.817 LUMBOSACRAL SPONDYLOSIS WITHOUT MYELOPATHY: Chronic | ICD-10-CM

## 2022-08-10 DIAGNOSIS — M47.892 OTHER OSTEOARTHRITIS OF SPINE, CERVICAL REGION: Chronic | ICD-10-CM

## 2022-08-10 DIAGNOSIS — M54.42 CHRONIC BILATERAL LOW BACK PAIN WITH BILATERAL SCIATICA: Primary | Chronic | ICD-10-CM

## 2022-08-10 DIAGNOSIS — M46.1 SACROILIITIS, NOT ELSEWHERE CLASSIFIED (HCC): Chronic | ICD-10-CM

## 2022-08-10 DIAGNOSIS — M54.41 CHRONIC BILATERAL LOW BACK PAIN WITH BILATERAL SCIATICA: Primary | Chronic | ICD-10-CM

## 2022-08-10 DIAGNOSIS — M50.30 DDD (DEGENERATIVE DISC DISEASE), CERVICAL: Chronic | ICD-10-CM

## 2022-08-10 DIAGNOSIS — F11.90 CHRONIC, CONTINUOUS USE OF OPIOIDS: ICD-10-CM

## 2022-08-10 PROCEDURE — 99213 OFFICE O/P EST LOW 20 MIN: CPT | Performed by: NURSE PRACTITIONER

## 2022-08-10 PROCEDURE — 99213 OFFICE O/P EST LOW 20 MIN: CPT

## 2022-08-10 RX ORDER — OXYCODONE HYDROCHLORIDE AND ACETAMINOPHEN 5; 325 MG/1; MG/1
1 TABLET ORAL EVERY 8 HOURS PRN
Qty: 90 TABLET | Refills: 0 | Status: SHIPPED | OUTPATIENT
Start: 2022-08-16 | End: 2022-09-12 | Stop reason: SDUPTHER

## 2022-08-10 RX ORDER — DULAGLUTIDE 0.75 MG/.5ML
INJECTION, SOLUTION SUBCUTANEOUS
COMMUNITY
Start: 2022-07-16 | End: 2022-08-10

## 2022-08-10 ASSESSMENT — PAIN SCALES - GENERAL: PAINLEVEL_OUTOF10: 8

## 2022-08-10 ASSESSMENT — ENCOUNTER SYMPTOMS
BACK PAIN: 1
SHORTNESS OF BREATH: 0
COUGH: 0

## 2022-08-10 NOTE — PROGRESS NOTES
Chief Complaint   Patient presents with    Back Pain    Medication Refill         [facility]     Pt c/o low back pain that radiates down legs. MRI done in 10- with multilevel degenerative changes and Right paracentral disc extrusion L4-5 narrowing the right lateral recess. She has never had a lumbar surgery and not interested in seeing NS for opinion. Patient states that she had a bad experience with an injection in the past and is not interested in any interventional pain procedures  She is dependant on her scooter to get around - can walk very short distances. Working on weight loss and reports recent 20lb loss over last 2 months. Pt has had injections but refuses to repeat d/t exreme pain during injections. Recent admission for hyperglycemia and pneumonia  on home O2  She saw Dr. Eliz Flores last month and he referred her to PT - she has had 5/12 visits and needs new referral to go back      HPI:     Back Pain  This is a chronic problem. The current episode started more than 1 year ago. The problem occurs constantly. The problem is unchanged. The pain is present in the lumbar spine. The quality of the pain is described as stabbing. The pain radiates to the left knee, left thigh and left foot. The pain is at a severity of 8/10. The pain is moderate. The pain is Worse during the night. The symptoms are aggravated by lying down, sitting, standing, position and twisting. Stiffness is present At night. Associated symptoms include headaches, numbness, tingling and weakness. She has tried bed rest, home exercises, heat, ice and walking for the symptoms. The treatment provided no relief. Patient denies any new neurological symptoms. No bowel or bladder incontinence, no weakness, and no falling.     Pill count: oxycodone 18 due 8/12 prescription adjusted appropriately was in hospital  8/16    Morphine equivalent: 22.5    Controlled Substance Monitoring:    Acute and Chronic Pain Monitoring:   RX Monitoring 7/12/2022   Attestation -   Acute Pain Prescriptions -   Periodic Controlled Substance Monitoring Possible medication side effects, risk of tolerance/dependence & alternative treatments discussed. ;No signs of potential drug abuse or diversion identified.;Obtaining appropriate analgesic effect of treatment. Chronic Pain > 50 MEDD -   Chronic Pain > 80 MEDD -                 Past Medical History:   Diagnosis Date    Abdominal bloating 1/26/2021    Adenomatous polyp of ascending colon 1/26/2021    Allergic rhinitis     Anxiety 7/17/2013    Arthritis     Asthma     Atrial fibrillation ([facility])     Back pain     NERVE/DR. GRACIA    Benign hypertension with CKD (chronic kidney disease) stage III ([facility])     CAD (coronary artery disease) 3/21/2013    Caffeine use     2 coffee/day    CHF (congestive heart failure) ([facility])     Chronic kidney disease     CKD (chronic kidney disease) stage 3, GFR 30-59 ml/min ([facility])     COPD (chronic obstructive pulmonary disease) ([facility])     emphysema    Degeneration of lumbar or lumbosacral intervertebral disc     Depression     DM (diabetes mellitus) ([address]     Emphysema of lung ([facility])     Gastritis     GERD (gastroesophageal reflux disease)     Hearing loss     Hematuria     Hiatal hernia     HTN (hypertension), benign 6/28/2014    Hypertriglyceridemia     Incontinence of urine 9/25/2013    Irritable bowel syndrome with both constipation and diarrhea 1/26/2021    Knee arthropathy     Lumbosacral spondylosis without myelopathy 9/29/2014    Migraine headache     DR. GRACIA    Mumps     Neuropathy     Obesity     On home oxygen therapy     2 L PER NC  HS AND PRN    HIGINIO on CPAP     Otitis media 1-26-15    Secondary diabetes mellitus with stage 3 chronic kidney disease (GFR 30-59) ([facility])     Stroke ([facility])      mini stroke.     Tinnitus     Type 2 diabetes mellitus without complication ([facility])     Type II or unspecified type diabetes mellitus without mention of complication, not stated as uncontrolled     UTI (lower urinary tract infection)     Varicose vein        Past Surgical History:   Procedure Laterality Date    ABLATION OF DYSRHYTHMIC FOCUS  2000    CARPAL TUNNEL RELEASE Right     CATARACT REMOVAL WITH IMPLANT Bilateral     CHOLECYSTECTOMY  2007    COLONOSCOPY      COLONOSCOPY  04/10/2017    tubular adenomo polyp , mod. sigmoid diverticulosis, min. int. hemorrhoids    COLONOSCOPY N/A 3/2/2021    COLONOSCOPY WITH BIOPSY performed by Ana Khan MD at [facility]  9/25/2013    DILATION AND CURETTAGE  1979    EYE SURGERY      laser    INCONTINENCE SURGERY      Percutaneous nerve stimulation Dr Lua Southern Nov 2013     INSERTABLE CARDIAC MONITOR  12/04/2015    [employer] REVEAL LINQ MODEL #[id number] MRI CONDTIONAL OK 3T. IMMEDIATELY POST IMPLANT    OTHER SURGICAL HISTORY  09/10/15    removal of interstim    FL COLSC FLX W/REMOVAL LESION BY HOT BX FORCEPS N/A 4/10/2017    COLONOSCOPY POLYPECTOMY HOT BIOPSY performed by Phuong Olivier MD at [place] [address]  08/21/2017    UPPER GASTROINTESTINAL ENDOSCOPY  03/2016    Dr Slim Roque ENDOSCOPY N/A 3/2/2021    EGD BIOPSY performed by Ana Khan MD at [facility] ENDO       Allergies   Allergen Reactions    Robitussin [Guaifenesin] Anaphylaxis    Codeine Swelling    Compazine [Prochlorperazine Maleate] Hives and Swelling    Iodides Itching    Morphine Other (See Comments)     hallucinations    Moxifloxacin      Other reaction(s): Intolerance-unknown  Other reaction(s):  Intolerance-unknown    Reglan [Metoclopramide] Nausea Only    Avelox [Moxifloxacin Hcl In Nacl] Rash     Dermatitis      Cipro Xr Rash     dermatitis    Sulfa Antibiotics Rash    Zofran [Ondansetron Hcl] Nausea And Vomiting         Current Outpatient Medications:     [START ON 8/16/2022] oxyCODONE-acetaminophen (PERCOCET) 5-325 MG per tablet, Take 1 tablet by mouth every 8 hours as needed for Pain for up to 30 days. Intended supply: 30 days. , Disp: 90 tablet, Rfl: 0    budesonide-formoterol (SYMBICORT) 160-4.5 MCG/ACT AERO, Inhale 2 puffs into the lungs in the morning and 2 puffs before bedtime. As needed for sob., Disp: 10.2 g, Rfl: 2    metFORMIN (GLUCOPHAGE) 1000 MG tablet, Take 1 tablet by mouth in the morning and 1 tablet in the evening. Take with meals. , Disp: 180 tablet, Rfl: 3    blood glucose test strips (TRUE METRIX BLOOD GLUCOSE TEST) strip, THREE TIMES A DAY, Disp: 100 each, Rfl: 3    blood glucose test strips (ONETOUCH ULTRA) strip, TEST TWO (2) TO THREE (3) TIMES A DAY & AS NEEDED FOR SYMPTOMS OF IRREGULAR BLOOD GLUCOSE, Disp: 100 strip, Rfl: 0    blood glucose test strips (ONETOUCH ULTRA) strip, As needed. , Disp: 100 strip, Rfl: 0    furosemide (LASIX) 20 MG tablet, TAKE 1 TABLET BY MOUTH ONCE DAILY AS NEEDED, Disp: 30 tablet, Rfl: 0    vitamin B-12 (CYANOCOBALAMIN) 100 MCG tablet, take half a tablet BY MOUTH ONCE DAILY, Disp: 30 tablet, Rfl: 0    Melatonin 10 MG TABS, Take 10 mg by mouth nightly, Disp: 90 tablet, Rfl: 3    dicyclomine (BENTYL) 10 MG capsule, TAKE 1 CAPSULE BY MOUTH TWICE A DAY AS NEEDED FOR ABDOMINAL SPASMS, Disp: 60 capsule, Rfl: 0     MG capsule, TAKE 1 CAPSULE BY MOUTH TWICE A DAY, Disp: 60 capsule, Rfl: 4    SM ASPIRIN ADULT LOW STRENGTH 81 MG EC tablet, TAKE 1 TABLET BY MOUTH ONCE DAILY, Disp: 90 tablet, Rfl: 4    pantoprazole (PROTONIX) 40 MG tablet, TAKE 1 TABLET BY MOUTH TWICE A DAY, Disp: 60 tablet, Rfl: 4    isosorbide dinitrate (ISORDIL) 30 MG tablet, TAKE 1 TABLET BY MOUTH ONCE DAILY, Disp: 30 tablet, Rfl: 4    escitalopram (LEXAPRO) 10 MG tablet, Take 1 tablet by mouth daily, Disp: 90 tablet, Rfl: 1    Dulaglutide (TRULICITY) 1.5 SM/3.7KE SOPN, Inject 1.5 mg into the skin once a week, Disp: 4 pen, Rfl: 1    FEROSUL 325 (65 Fe) MG tablet, TAKE 1 TAB BY MOUTH IN THE MORNING, Disp: 90 tablet, Rfl: 2    clopidogrel (PLAVIX) 75 MG tablet, TAKE 1 TAB BY MOUTH ONCE A DAY ( IN THE MORNING ) -THIS MEDICINE MAY BE TAKENWITH OR WITHOUT FOOD, Disp: 90 tablet, Rfl: 1    oxybutynin (DITROPAN-XL) 5 MG extended release tablet, TAKE 1 TABLET BY MOUTH DAILY, Disp: 30 tablet, Rfl: 3    fenofibrate (TRIGLIDE) 160 MG tablet, TAKE 1 TABLET BY MOUTH DAILY, Disp: 90 tablet, Rfl: 1    insulin aspart (NOVOLOG FLEXPEN) 100 UNIT/ML injection pen, IF<139 NO INSULIN; 140-199-2 UN;200-249-4 UN;250-299-6 UN;300-349-8 UN;350-400=10 UN;ABOVE 400-12 UNIT(S), Disp: 10 mL, Rfl: 3    magnesium oxide (MAG-OX) 400 MG tablet, , Disp: , Rfl:     insulin glargine (BASAGLAR KWIKPEN) 100 UNIT/ML injection pen, Inject 55 Units into the skin 2 times daily, Disp: 10 mL, Rfl: 2    losartan (COZAAR) 25 MG tablet, TAKE 1 TABLET BY MOUTH ONCE DAILY, Disp: 30 tablet, Rfl: 9    carvedilol (COREG) 3.125 MG tablet, TAKE 1 TAB BY MOUTH TWICE A DAY ( IN THE MORNING AND BEDTIME ), Disp: 180 tablet, Rfl: 3    Multiple Vitamins-Minerals (CERTAVITE/ANTIOXIDANTS) TABS, TAKE 1 TABLET BY MOUTH ONCE DAILY, Disp: 30 tablet, Rfl: 5    topiramate (TOPAMAX) 100 MG tablet, TAKE 1 TABLET BY MOUTH NIGHTLY , Disp: 90 tablet, Rfl: 2    atorvastatin (LIPITOR) 40 MG tablet, Take 1 tablet by mouth daily, Disp: 90 tablet, Rfl: 3    albuterol (PROVENTIL) (2.5 MG/3ML) 0.083% nebulizer solution, Take 3 mLs by nebulization every 6 hours as needed for Wheezing, Disp: 120 each, Rfl: 3    OXYGEN, Inhale 3 L into the lungs , Disp: , Rfl:     Family History   Problem Relation Age of Onset    Diabetes Mother     Heart Disease Mother     Stomach Cancer Father     Diabetes Maternal Grandmother         also aunts and uncles (maternal)    Emphysema Sister        Social History     Socioeconomic History    Marital status: Legally      Spouse name: Not on file    Number of children: Not on file    Years of education: Not on file    Highest education level: Not on file   Occupational History    Occupation: disabled     Employer: N/A   Tobacco oxyCODONE-acetaminophen (PERCOCET) 5-325 MG per tablet (Start on 8/16/2022)    Other Relevant Orders    Ambulatory referral to Physical Therapy    DDD (degenerative disc disease), cervical (Chronic)    Relevant Medications    oxyCODONE-acetaminophen (PERCOCET) 5-325 MG per tablet (Start on 8/16/2022)    Osteoarthritis of cervical spine (Chronic)    Relevant Medications    oxyCODONE-acetaminophen (PERCOCET) 5-325 MG per tablet (Start on 8/16/2022)    Lumbosacral spondylosis without myelopathy (Chronic)    Relevant Medications    oxyCODONE-acetaminophen (PERCOCET) 5-325 MG per tablet (Start on 8/16/2022)    Sacroiliitis, not elsewhere classified ([facility] (Chronic)    Relevant Medications    oxyCODONE-acetaminophen (PERCOCET) 5-325 MG per tablet (Start on 8/16/2022)    DDD (degenerative disc disease), lumbar    Relevant Medications    oxyCODONE-acetaminophen (PERCOCET) 5-325 MG per tablet (Start on 8/16/2022)    Other Relevant Orders    Ambulatory referral to Physical Therapy    Chronic, continuous use of opioids    Lumbar radiculopathy, chronic    Relevant Medications    oxyCODONE-acetaminophen (PERCOCET) 5-325 MG per tablet (Start on 8/16/2022)    Other Relevant Orders    Ambulatory referral to Physical Therapy            Treatment Plan:  Patient relates current medications are helping the pain. Patient reports taking pain medications as prescribed, denies obtaining medications from different sources and denies use of illegal drugs. Patient denies side effects from medications like nausea, vomiting, constipation or drowsiness. Patient reports current activities of daily living are possible due to medications and would like to continue them. As always, we encourage daily stretching and strengthening exercises, and recommend minimizing use of pain medications unless patient cannot get through daily activities due to pain. Contract requirements met. Continue opioid therapy.  Script written for percocet  Referral to PT   Follow up appointment made for 4 weeks    I have reviewed the chief complaint and history of present illness (including ROS and PFSH) and vital documentation by my staff and I agree with their documentation and have added where applicable.

## 2022-08-10 NOTE — PROGRESS NOTES
Chief Complaint   Patient presents with    Back Pain    Medication Refill         PM       HPI:     Back Pain  This is a chronic problem. The current episode started more than 1 year ago. The problem occurs constantly. The problem is unchanged. The pain is present in the lumbar spine. The quality of the pain is described as stabbing. The pain radiates to the left knee, left thigh and left foot. The pain is at a severity of 8/10. The pain is moderate. The pain is Worse during the night. The symptoms are aggravated by lying down, sitting, standing, position and twisting. Stiffness is present At night. Associated symptoms include headaches, numbness, tingling and weakness. She has tried bed rest, home exercises, heat, ice and walking for the symptoms. The treatment provided no relief. Patient denies any new neurological symptoms. No bowel or bladder incontinence, no weakness, and no falling. Pill count: oxycodone 18    Morphine equivalent:            Past Medical History:   Diagnosis Date    Abdominal bloating 1/26/2021    Adenomatous polyp of ascending colon 1/26/2021    Allergic rhinitis     Anxiety 7/17/2013    Arthritis     Asthma     Atrial fibrillation ([facility])     Back pain     NERVE/DR. GRACIA    Benign hypertension with CKD (chronic kidney disease) stage III ([facility])     CAD (coronary artery disease) 3/21/2013    Caffeine use     2 coffee/day    CHF (congestive heart failure) ([facility])     Chronic kidney disease     CKD (chronic kidney disease) stage 3, GFR 30-59 ml/min ([facility])     COPD (chronic obstructive pulmonary disease) ([facility])     emphysema    Degeneration of lumbar or lumbosacral intervertebral disc     Depression     DM (diabetes mellitus) ([facility] [address]     Emphysema of lung ([facility])     Gastritis     GERD (gastroesophageal reflux disease)     Hearing loss     Hematuria     Hiatal hernia     HTN (hypertension), benign 6/28/2014    Hypertriglyceridemia     Incontinence of urine 9/25/2013    Irritable bowel syndrome with both constipation and diarrhea 1/26/2021    Knee arthropathy     Lumbosacral spondylosis without myelopathy 9/29/2014    Migraine headache     DR. GRACIA    [place]     Neuropathy     Obesity     On home oxygen therapy     2 L PER NC  HS AND PRN    HIGINIO on CPAP     Otitis media 1-26-15    Secondary diabetes mellitus with stage 3 chronic kidney disease (GFR 30-59) (H[facility])     Stroke (H[facility])      mini stroke. Tinnitus     Type 2 diabetes mellitus without complication ([facility])     Type II or unspecified type diabetes mellitus without mention of complication, not stated as uncontrolled     UTI (lower urinary tract infection)     Varicose vein        Past Surgical History:   Procedure Laterality Date    ABLATION OF DYSRHYTHMIC FOCUS  2000    CARPAL TUNNEL RELEASE Right     CATARACT REMOVAL WITH IMPLANT Bilateral     CHOLECYSTECTOMY  2007    COLONOSCOPY      COLONOSCOPY  04/10/2017    tubular adenomo polyp , mod. sigmoid diverticulosis, min. int. hemorrhoids    COLONOSCOPY N/A 3/2/2021    COLONOSCOPY WITH BIOPSY performed by Hannah Lopez MD at [address] [place] [address]  9/25/2013    DILATION AND CURETTAGE  1979    EYE SURGERY      laser    INCONTINENCE SURGERY      Percutaneous nerve stimulation Dr Rachel Amaya 2013     INSERTABLE CARDIAC MONITOR  12/04/2015    [employer] REVEAL LINQ MODEL #[id number] MRI CONDTIONAL OK 3T.  IMMEDIATELY POST IMPLANT    OTHER SURGICAL HISTORY  09/10/15    removal of interstim    SD COLSC FLX W/REMOVAL LESION BY HOT BX FORCEPS N/A 4/10/2017    COLONOSCOPY POLYPECTOMY HOT BIOPSY performed by Briana Ha MD at [place] [address]  08/21/2017    UPPER GASTROINTESTINAL ENDOSCOPY  03/2016    Dr Harleen Jung ENDOSCOPY N/A 3/2/2021    EGD BIOPSY performed by Hannah Lopez MD at [address] [place] [address] ENDO       Allergies   Allergen Reactions    Robitussin [Guaifenesin] Anaphylaxis    Codeine Swelling Compazine [Prochlorperazine Maleate] Hives and Swelling    Iodides Itching    Morphine Other (See Comments)     hallucinations    Moxifloxacin      Other reaction(s): Intolerance-unknown  Other reaction(s): Intolerance-unknown    Reglan [Metoclopramide] Nausea Only    Avelox [Moxifloxacin Hcl In Nacl] Rash     Dermatitis      Cipro Xr Rash     dermatitis    Sulfa Antibiotics Rash    Zofran [Ondansetron Hcl] Nausea And Vomiting         Current Outpatient Medications:     budesonide-formoterol (SYMBICORT) 160-4.5 MCG/ACT AERO, Inhale 2 puffs into the lungs in the morning and 2 puffs before bedtime. As needed for sob., Disp: 10.2 g, Rfl: 2    metFORMIN (GLUCOPHAGE) 1000 MG tablet, Take 1 tablet by mouth in the morning and 1 tablet in the evening. Take with meals. , Disp: 180 tablet, Rfl: 3    blood glucose test strips (TRUE METRIX BLOOD GLUCOSE TEST) strip, THREE TIMES A DAY, Disp: 100 each, Rfl: 3    blood glucose test strips (ONETOUCH ULTRA) strip, TEST TWO (2) TO THREE (3) TIMES A DAY & AS NEEDED FOR SYMPTOMS OF IRREGULAR BLOOD GLUCOSE, Disp: 100 strip, Rfl: 0    blood glucose test strips (ONETOUCH ULTRA) strip, As needed. , Disp: 100 strip, Rfl: 0    furosemide (LASIX) 20 MG tablet, TAKE 1 TABLET BY MOUTH ONCE DAILY AS NEEDED, Disp: 30 tablet, Rfl: 0    vitamin B-12 (CYANOCOBALAMIN) 100 MCG tablet, take half a tablet BY MOUTH ONCE DAILY, Disp: 30 tablet, Rfl: 0    Melatonin 10 MG TABS, Take 10 mg by mouth nightly, Disp: 90 tablet, Rfl: 3    dicyclomine (BENTYL) 10 MG capsule, TAKE 1 CAPSULE BY MOUTH TWICE A DAY AS NEEDED FOR ABDOMINAL SPASMS, Disp: 60 capsule, Rfl: 0     MG capsule, TAKE 1 CAPSULE BY MOUTH TWICE A DAY, Disp: 60 capsule, Rfl: 4    SM ASPIRIN ADULT LOW STRENGTH 81 MG EC tablet, TAKE 1 TABLET BY MOUTH ONCE DAILY, Disp: 90 tablet, Rfl: 4    pantoprazole (PROTONIX) 40 MG tablet, TAKE 1 TABLET BY MOUTH TWICE A DAY, Disp: 60 tablet, Rfl: 4    isosorbide dinitrate (ISORDIL) 30 MG tablet, TAKE 1 TABLET BY MOUTH ONCE DAILY, Disp: 30 tablet, Rfl: 4    oxyCODONE-acetaminophen (PERCOCET) 5-325 MG per tablet, Take 1 tablet by mouth every 8 hours as needed for Pain for up to 30 days. Intended supply: 30 days. , Disp: 90 tablet, Rfl: 0    escitalopram (LEXAPRO) 10 MG tablet, Take 1 tablet by mouth daily, Disp: 90 tablet, Rfl: 1    Dulaglutide (TRULICITY) 1.5 NB/8.7HB SOPN, Inject 1.5 mg into the skin once a week, Disp: 4 pen, Rfl: 1    FEROSUL 325 (65 Fe) MG tablet, TAKE 1 TAB BY MOUTH IN THE MORNING, Disp: 90 tablet, Rfl: 2    clopidogrel (PLAVIX) 75 MG tablet, TAKE 1 TAB BY MOUTH ONCE A DAY ( IN THE MORNING ) -THIS MEDICINE MAY BE TAKENWITH OR WITHOUT FOOD, Disp: 90 tablet, Rfl: 1    oxybutynin (DITROPAN-XL) 5 MG extended release tablet, TAKE 1 TABLET BY MOUTH DAILY, Disp: 30 tablet, Rfl: 3    fenofibrate (TRIGLIDE) 160 MG tablet, TAKE 1 TABLET BY MOUTH DAILY, Disp: 90 tablet, Rfl: 1    insulin aspart (NOVOLOG FLEXPEN) 100 UNIT/ML injection pen, IF<139 NO INSULIN; 140-199-2 UN;200-249-4 UN;250-299-6 UN;300-349-8 UN;350-400=10 UN;ABOVE 400-12 UNIT(S), Disp: 10 mL, Rfl: 3    magnesium oxide (MAG-OX) 400 MG tablet, , Disp: , Rfl:     insulin glargine (BASAGLAR KWIKPEN) 100 UNIT/ML injection pen, Inject 55 Units into the skin 2 times daily, Disp: 10 mL, Rfl: 2    losartan (COZAAR) 25 MG tablet, TAKE 1 TABLET BY MOUTH ONCE DAILY, Disp: 30 tablet, Rfl: 9    carvedilol (COREG) 3.125 MG tablet, TAKE 1 TAB BY MOUTH TWICE A DAY ( IN THE MORNING AND BEDTIME ), Disp: 180 tablet, Rfl: 3    Multiple Vitamins-Minerals (CERTAVITE/ANTIOXIDANTS) TABS, TAKE 1 TABLET BY MOUTH ONCE DAILY, Disp: 30 tablet, Rfl: 5    topiramate (TOPAMAX) 100 MG tablet, TAKE 1 TABLET BY MOUTH NIGHTLY , Disp: 90 tablet, Rfl: 2    atorvastatin (LIPITOR) 40 MG tablet, Take 1 tablet by mouth daily, Disp: 90 tablet, Rfl: 3    albuterol (PROVENTIL) (2.5 MG/3ML) 0.083% nebulizer solution, Take 3 mLs by nebulization every 6 hours as needed for Wheezing, Disp: 120 each, Rfl: 3    OXYGEN, Inhale 3 L into the lungs , Disp: , Rfl:     Family History   Problem Relation Age of Onset    Diabetes Mother     Heart Disease Mother     Stomach Cancer Father     Diabetes Maternal Grandmother         also aunts and uncles (maternal)    Emphysema Sister        Social History     Socioeconomic History    Marital status: Legally      Spouse name: Not on file    Number of children: Not on file    Years of education: Not on file    Highest education level: Not on file   Occupational History    Occupation: disabled     Employer: N/A   Tobacco Use    Smoking status: Former     Packs/day: 3.00     Years: 41.00     Pack years: 123.00     Types: Cigarettes     Quit date: 2000     Years since quittin.6    Smokeless tobacco: Never    Tobacco comments:     quit in    Vaping Use    Vaping Use: Never used   Substance and Sexual Activity    Alcohol use: No     Alcohol/week: 0.0 standard drinks    Drug use: No    Sexual activity: Not Currently   Other Topics Concern    Not on file   Social History Narrative    Not on file     Social Determinants of Health     Financial Resource Strain: Low Risk     Difficulty of Paying Living Expenses: Not hard at all   Food Insecurity: No Food Insecurity    Worried About Running Out of Food in the Last Year: Never true    Ran Out of Food in the Last Year: Never true   Transportation Needs: Not on file   Physical Activity: Not on file   Stress: Not on file   Social Connections: Not on file   Intimate Partner Violence: Not on file   Housing Stability: Not on file       Review of Systems:  Review of Systems   Musculoskeletal:  Positive for back pain. Neurological:  Positive for headaches, numbness, tingling and weakness.      Physical Exam:  Temp 97.3 °F (36.3 °C)   LMP  (LMP Unknown)     Physical Exam    Record/Diagnostics Review:    Last greg  and was appropriate     Assessment:  Problem List Items Addressed This Visit    None         Treatment Plan:  Patient relates current medications are helping the pain. Patient reports taking pain medications as prescribed, denies obtaining medications from different sources and denies use of illegal drugs. Patient denies side effects from medications like nausea, vomiting, constipation or drowsiness. Patient reports current activities of daily living are possible due to medications and would like to continue them. As always, we encourage daily stretching and strengthening exercises, and recommend minimizing use of pain medications unless patient cannot get through daily activities due to pain. Contract requirements met. Continue opioid therapy. Script written for  Follow up appointment made for 4 weeks    I have reviewed the chief complaint and history of present illness (including ROS and PFSH) and vital documentation by my staff and I agree with their documentation and have added where applicable.

## 2022-08-15 ENCOUNTER — HOSPITAL ENCOUNTER (OUTPATIENT)
Age: 72
Setting detail: SPECIMEN
Discharge: HOME | End: 2022-08-15

## 2022-08-15 ENCOUNTER — HOSPITAL ENCOUNTER (OUTPATIENT)
Dept: GENERAL RADIOLOGY | Age: 72
Discharge: HOME | End: 2022-08-17
Payer: COMMERCIAL

## 2022-08-15 ENCOUNTER — HOSPITAL ENCOUNTER (OUTPATIENT)
Age: 72
Discharge: HOME | End: 2022-08-17
Payer: COMMERCIAL

## 2022-08-15 ENCOUNTER — OFFICE VISIT (OUTPATIENT)
Dept: PRIMARY CARE CLINIC | Age: 72
End: 2022-08-15
Payer: COMMERCIAL

## 2022-08-15 VITALS
OXYGEN SATURATION: 96 % | TEMPERATURE: 97 F | DIASTOLIC BLOOD PRESSURE: 75 MMHG | HEART RATE: 83 BPM | BODY MASS INDEX: 45.18 KG/M2 | SYSTOLIC BLOOD PRESSURE: 130 MMHG | WEIGHT: 247 LBS

## 2022-08-15 DIAGNOSIS — R10.2 PELVIC PAIN: ICD-10-CM

## 2022-08-15 DIAGNOSIS — F32.A ANXIETY AND DEPRESSION: ICD-10-CM

## 2022-08-15 DIAGNOSIS — F41.9 ANXIETY AND DEPRESSION: ICD-10-CM

## 2022-08-15 DIAGNOSIS — J44.1 CHRONIC OBSTRUCTIVE PULMONARY DISEASE WITH ACUTE EXACERBATION (HCC): ICD-10-CM

## 2022-08-15 DIAGNOSIS — Z79.4 TYPE 2 DIABETES MELLITUS WITH DIABETIC POLYNEUROPATHY, WITH LONG-TERM CURRENT USE OF INSULIN (HCC): ICD-10-CM

## 2022-08-15 DIAGNOSIS — R35.0 URINARY FREQUENCY: ICD-10-CM

## 2022-08-15 DIAGNOSIS — Z09 HOSPITAL DISCHARGE FOLLOW-UP: Primary | ICD-10-CM

## 2022-08-15 DIAGNOSIS — E11.42 TYPE 2 DIABETES MELLITUS WITH DIABETIC POLYNEUROPATHY, WITH LONG-TERM CURRENT USE OF INSULIN (HCC): ICD-10-CM

## 2022-08-15 DIAGNOSIS — E66.01 MORBID OBESITY WITH BMI OF 40.0-44.9, ADULT (HCC): ICD-10-CM

## 2022-08-15 LAB
BILIRUBIN, POC: ABNORMAL
BLOOD URINE, POC: NEGATIVE
CLARITY, POC: ABNORMAL
COLOR, POC: YELLOW
GLUCOSE URINE, POC: ABNORMAL
KETONES, POC: NEGATIVE
LEUKOCYTE EST, POC: ABNORMAL
NITRITE, POC: NEGATIVE
PH, POC: 6
PROTEIN, POC: NEGATIVE
SPECIFIC GRAVITY, POC: 1.02
UROBILINOGEN, POC: 0.2

## 2022-08-15 PROCEDURE — G8427 DOCREV CUR MEDS BY ELIG CLIN: HCPCS | Performed by: NURSE PRACTITIONER

## 2022-08-15 PROCEDURE — G8399 PT W/DXA RESULTS DOCUMENT: HCPCS | Performed by: NURSE PRACTITIONER

## 2022-08-15 PROCEDURE — 71046 X-RAY EXAM CHEST 2 VIEWS: CPT

## 2022-08-15 PROCEDURE — 1123F ACP DISCUSS/DSCN MKR DOCD: CPT | Performed by: NURSE PRACTITIONER

## 2022-08-15 PROCEDURE — 2022F DILAT RTA XM EVC RTNOPTHY: CPT | Performed by: NURSE PRACTITIONER

## 2022-08-15 PROCEDURE — 3017F COLORECTAL CA SCREEN DOC REV: CPT | Performed by: NURSE PRACTITIONER

## 2022-08-15 PROCEDURE — 99214 OFFICE O/P EST MOD 30 MIN: CPT | Performed by: NURSE PRACTITIONER

## 2022-08-15 PROCEDURE — 1111F DSCHRG MED/CURRENT MED MERGE: CPT | Performed by: NURSE PRACTITIONER

## 2022-08-15 PROCEDURE — 3023F SPIROM DOC REV: CPT | Performed by: NURSE PRACTITIONER

## 2022-08-15 PROCEDURE — 81003 URINALYSIS AUTO W/O SCOPE: CPT | Performed by: NURSE PRACTITIONER

## 2022-08-15 PROCEDURE — 1090F PRES/ABSN URINE INCON ASSESS: CPT | Performed by: NURSE PRACTITIONER

## 2022-08-15 PROCEDURE — 1036F TOBACCO NON-USER: CPT | Performed by: NURSE PRACTITIONER

## 2022-08-15 PROCEDURE — G8417 CALC BMI ABV UP PARAM F/U: HCPCS | Performed by: NURSE PRACTITIONER

## 2022-08-15 PROCEDURE — 3046F HEMOGLOBIN A1C LEVEL >9.0%: CPT | Performed by: NURSE PRACTITIONER

## 2022-08-15 RX ORDER — ESCITALOPRAM OXALATE 20 MG/1
20 TABLET ORAL DAILY
Qty: 90 TABLET | Refills: 1 | Status: SHIPPED | OUTPATIENT
Start: 2022-08-15 | End: 2022-10-13

## 2022-08-15 NOTE — PROGRESS NOTES
Post-Discharge Transitional Care  Follow Up      Na Garcia   YOB: 1950    Date of Office Visit:  8/15/2022  Date of Hospital Admission: 7/25/22  Date of Hospital Discharge: 7/28/22  Risk of hospital readmission (high >=14%. Medium >=10%) :Readmission Risk Score: 16.7      Care management risk score Rising risk (score 2-5) and Complex Care (Scores >=6): No Risk Score On File     Non face to face  following discharge, date last encounter closed (first attempt may have been earlier): *No documented post hospital discharge outreach found in the last 14 days    Call initiated 2 business days of discharge: *No response recorded in the last 14 days    ASSESSMENT/PLAN:   Hospital discharge follow-up  -     CT DISCHARGE MEDS RECONCILED W/ CURRENT OUTPATIENT MED LIST  Type 2 diabetes mellitus with diabetic polyneuropathy, with long-term current use of insulin ([facility] [address]  Chronic obstructive pulmonary disease with acute exacerbation (HCC)  -     XR CHEST STANDARD (2 VW); Future  Morbid obesity with BMI of 40.0-44.9, adult (HCC)  Pelvic pain  -     POCT Urinalysis No Micro (Auto)  -     Culture, Urine; Future  Urinary frequency  -     POCT Urinalysis No Micro (Auto)  -     Culture, Urine; Future  Anxiety and depression  -     escitalopram (LEXAPRO) 20 MG tablet; Take 1 tablet by mouth in the morning., Disp-90 tablet, R-1Normal    Depression not controlled, will increase lexapro to 20 mg. Plan to add Wellbutrin if not improved  Plans to start Trulicity 1.5 mg dosing this week, has not yet increased dose. And, states is tolerable. Encourage scheduled diabetic education, heavily discussed monitoring portions in regards to condiments and salad dressings. Repeat chest x-ray to evaluate pneumonia resolution, breathing improved with use of Symbicort. Sees Dr. Kameron Mcgarry next month for follow-up    Medical Decision Making: moderate complexity  No follow-ups on file.     On this date 8/15/2022 I have spent 30 minutes reviewing previous notes, test results and face to face with the patient discussing the diagnosis and importance of compliance with the treatment plan as well as documenting on the day of the visit. Subjective:   HPI:  Follow up of Hospital problems/diagnosis(es): Has not yet followed up with cardiology since discharge, last seen 3/29/2022, cardiology. Currently scheduled for 9/6/2022. Metformin 1000 mg twice daily prescribed. Discharge doxycycline for pneumonia. Also sent home on Symbicort inhaler 2 puffs twice daily. Denies cough or fevers, does still have some chest tightness. Complaining of generalized bloating along with pelvic discomfort and frequency of urination    Hx of depression, admits she did have counseling services in the past.     Inpatient course: Discharge summary reviewed- see chart. Hospital Course:  Jesus Manuel Kaufman is a 66 y.o. female who was admitted for the management of   Left lower lobe pneumonia , presented to ER with Dizziness (Was at physical therapy today, felt dizzy. Elevated BS)     The patient is a 66 y.o. female, with a history of AFIB-, CKD-3, CHF, COPD, DM HTN, CAD, HIGINIO who presents with concerns of hyperglycemia with blood sugars into the 500's, left lateral chest pain. Patient states that she has been having increased blood sugars over the past few days and today the blood sugars have been elevated into the 500's and she was taking her insulin without significant decrease in glucose. Patient admits to associated left chest pain shortness of breath, subjective low grade fevers and chills. Further questioning, the patient located the chest pain to the left lateral chest wall under the axilla. The pain is worse with respiration, but baseline constant, there is no radiation, there is associated  shortness of breath and nausea. Patient admits to increase use of her home oxygen from as needed to nearly all day, and increase uses of nebulizer. Patient was admitted with shortness of breath and chest pain, discussed with cardiology, troponin elevation was not because of the cardiology care due to type II NSTEMI,  Left lower lobe pneumonia on chest x-ray, acute respiratory failure needing 2 to 4 L of oxygen,  Morbid obesity sleep apnea and obesity hypoventilation syndrome,  Uncontrolled diabetes, sugars running high, manage with insulin,  Hypomagnesemia,  Malnutrition,  Underlying CKD 3,  Multiple comorbid conditions will contribute to her poor outcome,  Patient started complaining of abdominal pain, CT of the abdomen pelvis done, negative,  Lipase mildly elevated,  Possible acute pancreatitis,  Will continue IV fluids,  Lipase was lower ,  Abd pain all better  On home dose oxygen     Interval history/Current status: stable    Patient Active Problem List   Diagnosis    Chronic pain of left knee    Type 2 diabetes mellitus with diabetic polyneuropathy, with long-term current use of insulin (HCC)    COPD (chronic obstructive pulmonary disease)    Nonintractable migraine, unspecified migraine type    Coronary artery disease due to lipid rich plaque    DDD (degenerative disc disease), lumbar    Chronic, continuous use of opioids    DDD (degenerative disc disease), cervical    Osteoarthritis of cervical spine    GERD (gastroesophageal reflux disease)    Essential hypertension    Mixed hyperlipidemia    Insomnia    Lumbosacral spondylosis without myelopathy    Sacroiliitis, not elsewhere classified ([address]    Carpal tunnel syndrome    Mixed stress and urge urinary incontinence    Intractable migraine with aura without status migrainosus    Anxiety and depression    Chronic migraine without aura without status migrainosus, not intractable    Morbid (severe) obesity with alveolar hypoventilation (HCC)    Chronic nausea    Lung nodule    Neuropathy    Obstructive sleep apnea syndrome    Asthma with COPD ([address]    Obesity, Class III, BMI 40-49.9 (morbid obesity) ([address] Stage 3 chronic kidney disease (HCC)    Benign hypertension with CKD (chronic kidney disease) stage III (HCC)    Secondary diabetes mellitus with stage 3 chronic kidney disease (GFR 30-59) (HCC)    CKD (chronic kidney disease) stage 3, GFR 30-59 ml/min    Lumbar radiculopathy, chronic    Sessile colonic polyp    Morbid obesity (HCC)    Adenomatous polyp of ascending colon    Irritable bowel syndrome with both constipation and diarrhea    Abdominal bloating    Major depressive disorder, single episode, unspecified    Permanent atrial fibrillation (HCC)    Polyneuropathy, unspecified    Atypical chest pain    Left ventricular diastolic dysfunction    Transaminasemia    Acute respiratory failure with hypoxia and hypercarbia (HCC)    Macrocytosis    Morbid obesity with BMI of 40.0-44.9, adult (HCC)    Chronic diastolic congestive heart failure (HCC)    Oxygen dependent    Chronic bilateral low back pain with bilateral sciatica    Elevated troponin    Left lower lobe pneumonia    Prolonged Q-T interval on ECG    Chronic respiratory failure with hypoxia (HCC)    Pneumonia       Medications listed as ordered at the time of discharge from hospital     Medication List            Accurate as of August 15, 2022 11:51 AM. If you have any questions, ask your nurse or doctor. CHANGE how you take these medications      escitalopram 20 MG tablet  Commonly known as: LEXAPRO  Take 1 tablet by mouth in the morning.   What changed:   medication strength  how much to take  Changed by: Florencia Fernandez, APRN - CNP            CONTINUE taking these medications      albuterol (2.5 MG/3ML) 0.083% nebulizer solution  Commonly known as: PROVENTIL  Take 3 mLs by nebulization every 6 hours as needed for Wheezing     atorvastatin 40 MG tablet  Commonly known as: Lipitor  Take 1 tablet by mouth daily     Basaglar KwikPen 100 UNIT/ML injection pen  Generic drug: insulin glargine  Inject 55 Units into the skin 2 times daily budesonide-formoterol 160-4.5 MCG/ACT Aero  Commonly known as: Symbicort  Inhale 2 puffs into the lungs in the morning and 2 puffs before bedtime. As needed for sob. carvedilol 3.125 MG tablet  Commonly known as: COREG  TAKE 1 TAB BY MOUTH TWICE A DAY ( IN THE MORNING AND BEDTIME )     CertaVite/Antioxidants Tabs  TAKE 1 TABLET BY MOUTH ONCE DAILY     clopidogrel 75 MG tablet  Commonly known as: PLAVIX  TAKE 1 TAB BY MOUTH ONCE A DAY ( IN THE MORNING ) -THIS MEDICINE MAY BE TAKENWITH OR WITHOUT FOOD     dicyclomine 10 MG capsule  Commonly known as: BENTYL  TAKE 1 CAPSULE BY MOUTH TWICE A DAY AS NEEDED FOR ABDOMINAL SPASMS      MG capsule  Generic drug: docusate sodium  TAKE 1 CAPSULE BY MOUTH TWICE A DAY     fenofibrate 160 MG tablet  Commonly known as: TRIGLIDE  TAKE 1 TABLET BY MOUTH DAILY     FeroSul 325 (65 Fe) MG tablet  Generic drug: ferrous sulfate  TAKE 1 TAB BY MOUTH IN THE MORNING     furosemide 20 MG tablet  Commonly known as: LASIX  TAKE 1 TABLET BY MOUTH ONCE DAILY AS NEEDED     isosorbide dinitrate 30 MG tablet  Commonly known as: ISORDIL  TAKE 1 TABLET BY MOUTH ONCE DAILY     losartan 25 MG tablet  Commonly known as: COZAAR  TAKE 1 TABLET BY MOUTH ONCE DAILY     magnesium oxide 400 MG tablet  Commonly known as: MAG-OX     Melatonin 10 MG Tabs  Take 10 mg by mouth nightly     metFORMIN 1000 MG tablet  Commonly known as: GLUCOPHAGE  Take 1 tablet by mouth in the morning and 1 tablet in the evening. Take with meals. NovoLOG FlexPen 100 UNIT/ML injection pen  Generic drug: insulin aspart  IF<139 NO INSULIN; 140-199-2 UN;200-249-4 UN;250-299-6 UN;300-349-8 UN;350-400=10 UN;ABOVE 400-12 UNIT(S)     oxybutynin 5 MG extended release tablet  Commonly known as: DITROPAN-XL  TAKE 1 TABLET BY MOUTH DAILY     oxyCODONE-acetaminophen 5-325 MG per tablet  Commonly known as: Percocet  Take 1 tablet by mouth every 8 hours as needed for Pain for up to 30 days. Intended supply: 30 days.   Start taking on: August 16, 2022     OXYGEN     pantoprazole 40 MG tablet  Commonly known as: PROTONIX  TAKE 1 TABLET BY MOUTH TWICE A DAY     SM Aspirin Adult Low Strength 81 MG EC tablet  Generic drug: aspirin  TAKE 1 TABLET BY MOUTH ONCE DAILY     topiramate 100 MG tablet  Commonly known as: TOPAMAX  TAKE 1 TABLET BY MOUTH NIGHTLY     * True Metrix Blood Glucose Test strip  Generic drug: blood glucose test strips  THREE TIMES A DAY     * OneTouch Ultra strip  Generic drug: blood glucose test strips  TEST TWO (2) TO THREE (3) TIMES A DAY & AS NEEDED FOR SYMPTOMS OF IRREGULAR BLOOD GLUCOSE     * OneTouch Ultra strip  Generic drug: blood glucose test strips  As needed. Trulicity 1.5 PH/5.6JL Sopn  Generic drug: Dulaglutide  Inject 1.5 mg into the skin once a week     vitamin B-12 100 MCG tablet  Commonly known as: CYANOCOBALAMIN  take half a tablet BY MOUTH ONCE DAILY           * This list has 3 medication(s) that are the same as other medications prescribed for you. Read the directions carefully, and ask your doctor or other care provider to review them with you. Where to Get Your Medications        These medications were sent to [address], [address] [phone] - f [phone]  [facility] [address], [address] [address]      Phone: [phone]   escitalopram 20 MG tablet           Medications marked \"taking\" at this time  Outpatient Medications Marked as Taking for the 8/15/22 encounter (Office Visit) with VERONIKA Larsen CNP   Medication Sig Dispense Refill    escitalopram (LEXAPRO) 20 MG tablet Take 1 tablet by mouth in the morning. 90 tablet 1    [START ON 8/16/2022] oxyCODONE-acetaminophen (PERCOCET) 5-325 MG per tablet Take 1 tablet by mouth every 8 hours as needed for Pain for up to 30 days. Intended supply: 30 days. 90 tablet 0    budesonide-formoterol (SYMBICORT) 160-4.5 MCG/ACT AERO Inhale 2 puffs into the lungs in the morning and 2 puffs before bedtime.  As needed for sob. 10.2 g 2    metFORMIN (GLUCOPHAGE) 1000 MG tablet Take 1 tablet by mouth in the morning and 1 tablet in the evening. Take with meals. 180 tablet 3    blood glucose test strips (TRUE METRIX BLOOD GLUCOSE TEST) strip THREE TIMES A  each 3    blood glucose test strips (ONETOUCH ULTRA) strip TEST TWO (2) TO THREE (3) TIMES A DAY & AS NEEDED FOR SYMPTOMS OF IRREGULAR BLOOD GLUCOSE 100 strip 0    blood glucose test strips (ONETOUCH ULTRA) strip As needed.  100 strip 0    furosemide (LASIX) 20 MG tablet TAKE 1 TABLET BY MOUTH ONCE DAILY AS NEEDED 30 tablet 0    Melatonin 10 MG TABS Take 10 mg by mouth nightly 90 tablet 3    dicyclomine (BENTYL) 10 MG capsule TAKE 1 CAPSULE BY MOUTH TWICE A DAY AS NEEDED FOR ABDOMINAL SPASMS 60 capsule 0     MG capsule TAKE 1 CAPSULE BY MOUTH TWICE A DAY 60 capsule 4    isosorbide dinitrate (ISORDIL) 30 MG tablet TAKE 1 TABLET BY MOUTH ONCE DAILY 30 tablet 4    Dulaglutide (TRULICITY) 1.5 YJ/8.5WJ SOPN Inject 1.5 mg into the skin once a week 4 pen 1    FEROSUL 325 (65 Fe) MG tablet TAKE 1 TAB BY MOUTH IN THE MORNING 90 tablet 2    clopidogrel (PLAVIX) 75 MG tablet TAKE 1 TAB BY MOUTH ONCE A DAY ( IN THE MORNING ) -THIS MEDICINE MAY BE TAKENWITH OR WITHOUT FOOD 90 tablet 1    oxybutynin (DITROPAN-XL) 5 MG extended release tablet TAKE 1 TABLET BY MOUTH DAILY 30 tablet 3    fenofibrate (TRIGLIDE) 160 MG tablet TAKE 1 TABLET BY MOUTH DAILY 90 tablet 1    insulin aspart (NOVOLOG FLEXPEN) 100 UNIT/ML injection pen IF<139 NO INSULIN; 140-199-2 UN;200-249-4 UN;250-299-6 UN;300-349-8 UN;350-400=10 UN;ABOVE 400-12 UNIT(S) 10 mL 3    magnesium oxide (MAG-OX) 400 MG tablet       insulin glargine (BASAGLAR KWIKPEN) 100 UNIT/ML injection pen Inject 55 Units into the skin 2 times daily 10 mL 2    losartan (COZAAR) 25 MG tablet TAKE 1 TABLET BY MOUTH ONCE DAILY 30 tablet 9    carvedilol (COREG) 3.125 MG tablet TAKE 1 TAB BY MOUTH TWICE A DAY ( IN THE MORNING AND BEDTIME ) 180

## 2022-08-15 NOTE — PROGRESS NOTES
Visit Information    Have you changed or started any medications since your last visit including any over-the-counter medicines, vitamins, or herbal medicines? no   Are you having any side effects from any of your medications? -  no  Have you stopped taking any of your medications? Is so, why? -  yes -     Have you seen any other physician or provider since your last visit? No  Have you had any other diagnostic tests since your last visit? Yes - Records Obtained  Have you been seen in the emergency room and/or had an admission to a hospital since we last saw you? Yes - Records Obtained  Have you had your routine dental cleaning in the past 6 months? no    Have you activated your [employer] account? If not, what are your barriers?  Yes     Patient Care Team:  VERONIKA Mclaughlin CNP as PCP - General (Family Medicine)  VERONIKA Mclaughlin CNP as PCP - [facility]  Waylon Fischer MD as Consulting Physician (Urology)  Anjana Roberson MD as Consulting Physician (Pulmonology)  John Vazquez (Optometry)  VERONIKA Mcmahan CNP as Nurse Practitioner (Certified Nurse Practitioner)  Geri Gonzalez MD as Consulting Physician (Internal Medicine Cardiovascular Disease)  Cande Macias MD as Consulting Physician (Internal Medicine Cardiovascular Disease)  Livia Jarrett MD as Consulting Physician (Pain Management)  Vianney Wells DPM as Consulting Physician (Podiatry)  Rickie Burton MD as Consulting Physician (Nephrology)    Medical History Review  Past Medical, Family, and Social History reviewed and does not contribute to the patient presenting condition    Health Maintenance   Topic Date Due    Annual Wellness Visit (AWV)  Never done    DTaP/Tdap/Td vaccine (1 - Tdap) Never done    Diabetic retinal exam  10/29/2021    Flu vaccine (1) 09/01/2022    COVID-19 Vaccine (5 - Booster for Rashmi Lied series) 09/20/2022    A1C test (Diabetic or Prediabetic)  10/08/2022    Breast cancer screen  11/17/2022    Lipids  03/09/2023    Diabetic foot exam  05/06/2023    Depression Monitoring  05/10/2023    Colorectal Cancer Screen  03/02/2024    DEXA (modify frequency per FRAX score)  Completed    Pneumococcal 65+ years Vaccine  Completed    Hepatitis C screen  Completed    Hepatitis A vaccine  Aged Out    Hib vaccine  Aged Out    Meningococcal (ACWY) vaccine  Aged Out

## 2022-08-17 LAB
CULTURE: NORMAL
SPECIMEN DESCRIPTION: NORMAL

## 2022-08-18 NOTE — DISCHARGE SUMMARY
[facility]    Discharge Summary     Patient ID: Braulio Amezquita  :  1950   MRN: [id number]     ACCOUNT:  [de-identified]   Patient's PCP: Krystle Bhardwaj MD  Admit Date: 2018   Discharge Date: 2018   Length of Stay: 6  Code Status:  Full Code  Admitting Physician: Britt Scales MD  Discharge Physician: Anitha Simons MD     Active Discharge Diagnoses:       Primary Problem  Acute on chronic respiratory failure with hypoxia [facility])      [facility]    Diagnosis Date Noted    Type 2 diabetes mellitus with diabetic polyneuropathy, with long-term current use of insulin ([facility] [address] [E11.42, Z79.4]      Priority: High    Acute on chronic respiratory failure with hypoxia ([address] [J96.21] 2018    YAJAIRA (acute kidney injury) ([address] [N17.9] 2018    HIGINIO on CPAP [G47.33, Z99.89] 2018    COPD exacerbation ([address] [J44.1] 2018    Obesity, morbid, BMI 40.0-49.9 ([address] [E66.01] 2016       Admission Condition:  poor     Discharged Condition: stable    Hospital Stay:       Hospital Course:  Braulio Amezquita is a 75 y.o. female who was admitted for the management of Acute on chronic respiratory failure with hypoxia ([address] , presented to ER with Shortness of Breath and Cough  She was given IV steroid and she has improved. CXR was negative. Today on day of discharge, patient states her breathing has improved. She is discharged on PO steroid 40 x5 days. Review of Systems   Constitutional: Negative for chills and fever. HENT: Negative for hearing loss. Respiratory: Positive for cough and shortness of breath. Cardiovascular: Negative for chest pain. Gastrointestinal: Negative for abdominal pain, constipation, diarrhea, nausea and vomiting. Genitourinary: Negative for dysuria. Neurological: Negative for headaches.      Physical Exam   Constitutional: She is oriented to person, Clinic Administered Medication Documentation    Administrations This Visit     methylPREDNISolone (DEPO-MEDROL) injection 80 mg     Admin Date  08/18/2022 Action  Given Dose  80 mg Route  Intramuscular Site  Left Gluteus Dyllan Administered By  Mesha Pratt RN    Ordering Provider: Vee Bhatti APRN CNP    NDC: 85489-326-01    Lot#: [id number]    : [employer]    Patient Supplied?: No                  Injectable Medication Documentation    Patient was given Depo-Medrol. Prior to medication administration, verified patients identity using patient s name and date of birth. Please see MAR and medication order for additional information. Patient instructed to remain in clinic for 15 minutes.      Was entire vial of medication used? Yes  Vial/Syringe: Single dose vial  Expiration Date:  10/2022  Was this medication supplied by the patient? No    Name of provider who requested the medication administration: Vee Bhatti CNP  Name of provider on site (faculty or community preceptor) at the time of performing the medication administration: Vee Bhatti CNP     Date of next administration: 1 year  Date of next office visit with provider to renew medication plan (must be seen annually): 10/2022    Mesha Pratt RN  [facility]     [address] [facility] [address]  [phone]    Schedule an appointment as soon as possible for a visit in 1 week  For COPD exacerbation       Requiring Further Evaluation/Follow Up POST HOSPITALIZATION/Incidental Findings:   None    Diet: diabetic diet    Activity: As tolerated    Instructions to Patient:   Take all your medication as prescribed. If your prescription has refills, obtain the medication refills from the pharmacy before you run out. Seek medical attention if you run out of a medication you need and do not have any refills of.       Discharge Medications:      Medication List      START taking these medications    benzonatate 200 MG capsule  Commonly known as:  TESSALON  Take 1 capsule by mouth every 8 hours as needed for Cough     calcium carbonate 500 MG chewable tablet  Commonly known as:  TUMS  Take 1 tablet by mouth 3 times daily as needed for Heartburn     insulin aspart 100 UNIT/ML injection pen  Commonly known as:  NOVOLOG FLEXPEN  Inject 0-12 Units into the skin 3 times daily (before meals)     ipratropium-albuterol 0.5-2.5 (3) MG/3ML Soln nebulizer solution  Commonly known as:  DUONEB  Inhale 3 mLs into the lungs every 4 hours     predniSONE 20 MG tablet  Commonly known as:  DELTASONE  Take 1 tablet by mouth 2 times daily for 5 days        CHANGE how you take these medications    pantoprazole 40 MG tablet  Commonly known as:  PROTONIX  Take 1 tablet by mouth daily  What changed:  when to take this        CONTINUE taking these medications    amLODIPine 5 MG tablet  Commonly known as:  NORVASC  TAKE 1 TAB BY MOUTH ONCE A DAY     atorvastatin 80 MG tablet  Commonly known as:  LIPITOR  TAKE 1 TAB BY MOUTH ONCE A DAY     carvedilol 3.125 MG tablet  Commonly known as:  COREG  TAKE 1 TAB BY MOUTH TWICE A DAY     citalopram 40 MG tablet  Commonly known as:  CELEXA  TAKE 1 TAB BY MOUTH ONCE A DAY     clopidogrel 75 MG tablet  Commonly known as: nebulizer solution  Commonly known as:  PROVENTIL     Ankle Brace/High [employer] L Misc     diphenhydrAMINE 25 MG capsule  Commonly known as:  BENADRYL     insulin regular 100 UNIT/ML injection  Commonly known as:  HUMULIN R;NOVOLIN R     ipratropium 0.02 % nebulizer solution  Commonly known as:  ATROVENT     ranitidine 150 MG tablet  Commonly known as:  ZANTAC           Where to Get Your Medications      These medications were sent to [facility] [address], [address] [facility], [place] [address]    Phone:  [phone]   · benzonatate 200 MG capsule  · calcium carbonate 500 MG chewable tablet  · insulin aspart 100 UNIT/ML injection pen  · ipratropium-albuterol 0.5-2.5 (3) MG/3ML Soln nebulizer solution  · pantoprazole 40 MG tablet  · predniSONE 20 MG tablet         Time Spent on discharge is  31 mins in patient examination, evaluation, counseling as well as medication reconciliation, prescriptions for required medications, discharge plan and follow up. Electronically signed by   Kim Constantino MD  2/6/2018  2:07 PM      Thank you Dr. Anthony Wilson MD for the opportunity to be involved in this patient's care. Attending Physician Statement  I have reviewed and edited the discharge summary of  [address] [place] [address]  ,   including pertinent history and exam findings. I have reviewed the key elements of all parts of the discharge summary . I agree with the information and plans as documented by the resident. Time spent on discharge planning ;          [] less than 30 minutes . [x]   more  than 30 minutes . Electronically signed by Peter Carrasco MD on 2/6/2018 .

## 2022-08-22 LAB
EKG ATRIAL RATE: 67 BPM
EKG P AXIS: 71 DEGREES
EKG P-R INTERVAL: 148 MS
EKG Q-T INTERVAL: 408 MS
EKG QRS DURATION: 70 MS
EKG QTC CALCULATION (BAZETT): 431 MS
EKG R AXIS: -38 DEGREES
EKG T AXIS: 86 DEGREES
EKG VENTRICULAR RATE: 67 BPM

## 2022-08-22 PROCEDURE — 93010 ELECTROCARDIOGRAM REPORT: CPT | Performed by: INTERNAL MEDICINE

## 2022-08-25 PROBLEM — R77.8 ELEVATED TROPONIN: Status: RESOLVED | Noted: 2022-07-25 | Resolved: 2022-08-25

## 2022-08-25 PROBLEM — R79.89 ELEVATED TROPONIN: Status: RESOLVED | Noted: 2022-07-25 | Resolved: 2022-08-25

## 2022-08-26 ENCOUNTER — HOSPITAL ENCOUNTER (OUTPATIENT)
Dept: GENERAL RADIOLOGY | Age: 72
Discharge: HOME | End: 2022-08-28
Payer: COMMERCIAL

## 2022-08-26 ENCOUNTER — HOSPITAL ENCOUNTER (OUTPATIENT)
Dept: NUCLEAR MEDICINE | Age: 72
Discharge: HOME | End: 2022-08-28
Payer: COMMERCIAL

## 2022-08-26 ENCOUNTER — HOSPITAL ENCOUNTER (OUTPATIENT)
Age: 72
Discharge: HOME | End: 2022-08-28
Payer: COMMERCIAL

## 2022-08-26 DIAGNOSIS — R07.9 CHEST PAIN, UNSPECIFIED TYPE: ICD-10-CM

## 2022-08-26 DIAGNOSIS — J96.11 CHRONIC HYPOXEMIC RESPIRATORY FAILURE (HCC): ICD-10-CM

## 2022-08-26 PROCEDURE — A9539 TC99M PENTETATE: HCPCS | Performed by: INTERNAL MEDICINE

## 2022-08-26 PROCEDURE — 71046 X-RAY EXAM CHEST 2 VIEWS: CPT

## 2022-08-26 PROCEDURE — 3430000000 HC RX DIAGNOSTIC RADIOPHARMACEUTICAL: Performed by: INTERNAL MEDICINE

## 2022-08-26 PROCEDURE — A9540 TC99M MAA: HCPCS | Performed by: INTERNAL MEDICINE

## 2022-08-26 PROCEDURE — 78582 LUNG VENTILAT&PERFUS IMAGING: CPT

## 2022-08-26 PROCEDURE — 2580000003 HC RX 258: Performed by: INTERNAL MEDICINE

## 2022-08-26 RX ORDER — SODIUM CHLORIDE 0.9 % (FLUSH) 0.9 %
10 SYRINGE (ML) INJECTION PRN
Status: DISCONTINUED | OUTPATIENT
Start: 2022-08-26 | End: 2022-08-29 | Stop reason: HOSPADM

## 2022-08-26 RX ORDER — KIT FOR THE PREPARATION OF TECHNETIUM TC 99M PENTETATE 20 MG/1
44 INJECTION, POWDER, LYOPHILIZED, FOR SOLUTION INTRAVENOUS; RESPIRATORY (INHALATION)
Status: COMPLETED | OUTPATIENT
Start: 2022-08-26 | End: 2022-08-26

## 2022-08-26 RX ADMIN — Medication 8.1 MILLICURIE: at 13:35

## 2022-08-26 RX ADMIN — SODIUM CHLORIDE, PRESERVATIVE FREE 10 ML: 5 INJECTION INTRAVENOUS at 13:35

## 2022-08-26 RX ADMIN — KIT FOR THE PREPARATION OF TECHNETIUM TC 99M PENTETATE 42.2 MILLICURIE: 20 INJECTION, POWDER, LYOPHILIZED, FOR SOLUTION INTRAVENOUS; RESPIRATORY (INHALATION) at 12:47

## 2022-09-12 ENCOUNTER — TELEPHONE (OUTPATIENT)
Dept: PAIN MANAGEMENT | Age: 72
End: 2022-09-12

## 2022-09-12 DIAGNOSIS — M50.30 DDD (DEGENERATIVE DISC DISEASE), CERVICAL: Chronic | ICD-10-CM

## 2022-09-12 DIAGNOSIS — M54.16 LUMBAR RADICULOPATHY, CHRONIC: ICD-10-CM

## 2022-09-12 DIAGNOSIS — M46.1 SACROILIITIS, NOT ELSEWHERE CLASSIFIED (HCC): Chronic | ICD-10-CM

## 2022-09-12 DIAGNOSIS — M47.817 LUMBOSACRAL SPONDYLOSIS WITHOUT MYELOPATHY: Chronic | ICD-10-CM

## 2022-09-12 RX ORDER — OXYCODONE HYDROCHLORIDE AND ACETAMINOPHEN 5; 325 MG/1; MG/1
1 TABLET ORAL EVERY 8 HOURS PRN
Qty: 90 TABLET | Refills: 0 | Status: SHIPPED | OUTPATIENT
Start: 2022-09-15 | End: 2022-10-06 | Stop reason: SDUPTHER

## 2022-09-24 DIAGNOSIS — N39.3 STRESS INCONTINENCE: ICD-10-CM

## 2022-09-26 RX ORDER — OXYBUTYNIN CHLORIDE 5 MG/1
5 TABLET, EXTENDED RELEASE ORAL DAILY
Qty: 30 TABLET | Refills: 3 | Status: SHIPPED | OUTPATIENT
Start: 2022-09-26

## 2022-10-06 ENCOUNTER — HOSPITAL ENCOUNTER (OUTPATIENT)
Dept: PAIN MANAGEMENT | Age: 72
Discharge: HOME | End: 2022-10-06
Payer: COMMERCIAL

## 2022-10-06 VITALS
SYSTOLIC BLOOD PRESSURE: 82 MMHG | DIASTOLIC BLOOD PRESSURE: 49 MMHG | OXYGEN SATURATION: 90 % | TEMPERATURE: 97.7 F | RESPIRATION RATE: 20 BRPM | HEART RATE: 79 BPM

## 2022-10-06 DIAGNOSIS — G89.29 CHRONIC BILATERAL LOW BACK PAIN WITH BILATERAL SCIATICA: Primary | Chronic | ICD-10-CM

## 2022-10-06 DIAGNOSIS — M50.30 DDD (DEGENERATIVE DISC DISEASE), CERVICAL: Chronic | ICD-10-CM

## 2022-10-06 DIAGNOSIS — M47.817 LUMBOSACRAL SPONDYLOSIS WITHOUT MYELOPATHY: Chronic | ICD-10-CM

## 2022-10-06 DIAGNOSIS — M54.42 CHRONIC BILATERAL LOW BACK PAIN WITH BILATERAL SCIATICA: Primary | Chronic | ICD-10-CM

## 2022-10-06 DIAGNOSIS — M54.41 CHRONIC BILATERAL LOW BACK PAIN WITH BILATERAL SCIATICA: Primary | Chronic | ICD-10-CM

## 2022-10-06 DIAGNOSIS — M46.1 SACROILIITIS, NOT ELSEWHERE CLASSIFIED (HCC): Chronic | ICD-10-CM

## 2022-10-06 DIAGNOSIS — M54.16 LUMBAR RADICULOPATHY, CHRONIC: ICD-10-CM

## 2022-10-06 PROCEDURE — 99213 OFFICE O/P EST LOW 20 MIN: CPT

## 2022-10-06 PROCEDURE — 99213 OFFICE O/P EST LOW 20 MIN: CPT | Performed by: NURSE PRACTITIONER

## 2022-10-06 RX ORDER — OXYCODONE HYDROCHLORIDE AND ACETAMINOPHEN 5; 325 MG/1; MG/1
1 TABLET ORAL EVERY 8 HOURS PRN
Qty: 90 TABLET | Refills: 0 | Status: SHIPPED | OUTPATIENT
Start: 2022-10-15 | End: 2022-11-14

## 2022-10-06 ASSESSMENT — ENCOUNTER SYMPTOMS
BACK PAIN: 1
COUGH: 0
CONSTIPATION: 0
SHORTNESS OF BREATH: 0

## 2022-10-06 ASSESSMENT — PAIN SCALES - GENERAL: PAINLEVEL_OUTOF10: 8

## 2022-10-06 NOTE — PROGRESS NOTES
Chief Complaint   Patient presents with    Medication Refill    Back Pain         [facility]  Pt c/o low back pain that radiates down legs. MRI done in 10- with multilevel degenerative changes and Right paracentral disc extrusion L4-5 narrowing the right lateral recess. She has never had a lumbar surgery and not interested in seeing NS for opinion. Patient states that she had a bad experience with an injection in the past and is not interested in any interventional pain procedures  She is dependant on her scooter to get around - can walk very short distances. Working on weight loss and reports recent 20lb loss over last 2 months. Pt has had injections but refuses to repeat d/t exreme pain during injections. Recent admission for hyperglycemia and pneumonia  on home O2  She saw Dr. Jono Mukherjee last month and he referred her to PT - she has had 5/12 visits and needs new referral to go back - referral given at last visit. She has not started sessions yet. Back Pain  This is a chronic problem. The current episode started more than 1 year ago. The problem occurs intermittently. The problem is unchanged. The pain is present in the lumbar spine and gluteal. The quality of the pain is described as aching. The pain radiates to the left knee, left thigh, right knee, right foot, right thigh and left foot. The pain is at a severity of 8/10. The pain is moderate. The pain is The same all the time. The symptoms are aggravated by bending, sitting, standing, twisting and position. Stiffness is present All day. Associated symptoms include headaches. Pertinent negatives include no chest pain, fever, numbness, tingling or weakness. She has tried bed rest, home exercises, heat, ice and walking for the symptoms. Patient denies any new neurological symptoms. No bowel or bladder incontinence, no weakness, and no falling.     Pill count: 27 oxycodone due 10/15    Morphine equivalent: 22.5    Controlled Substance Monitoring:    Acute and Chronic Pain Monitoring:   RX Monitoring 10/6/2022   Attestation -   Acute Pain Prescriptions -   Periodic Controlled Substance Monitoring Possible medication side effects, risk of tolerance/dependence & alternative treatments discussed. ;No signs of potential drug abuse or diversion identified.;Obtaining appropriate analgesic effect of treatment. Chronic Pain > 50 MEDD -   Chronic Pain > 80 MEDD -            Past Medical History:   Diagnosis Date    Abdominal bloating 1/26/2021    Adenomatous polyp of ascending colon 1/26/2021    Allergic rhinitis     Anxiety 7/17/2013    Arthritis     Asthma     Atrial fibrillation ([facility])     Back pain     NERVE/DR. GRACIA    Benign hypertension with CKD (chronic kidney disease) stage III ([facility])     CAD (coronary artery disease) 3/21/2013    Caffeine use     2 coffee/day    CHF (congestive heart failure) ([facility])     Chronic kidney disease     CKD (chronic kidney disease) stage 3, GFR 30-59 ml/min ([facility])     COPD (chronic obstructive pulmonary disease) ([facility])     emphysema    Degeneration of lumbar or lumbosacral intervertebral disc     Depression     DM (diabetes mellitus) ([facility] [address]     Emphysema of lung ([facility])     Gastritis     GERD (gastroesophageal reflux disease)     Hearing loss     Hematuria     Hiatal hernia     HTN (hypertension), benign 6/28/2014    Hypertriglyceridemia     Incontinence of urine 9/25/2013    Irritable bowel syndrome with both constipation and diarrhea 1/26/2021    Knee arthropathy     Lumbosacral spondylosis without myelopathy 9/29/2014    Migraine headache     DR. GRACIA    Mumps     Neuropathy     Obesity     On home oxygen therapy     2 L PER NC  HS AND PRN    HIGINIO on CPAP     Otitis media 1-26-15    Secondary diabetes mellitus with stage 3 chronic kidney disease (GFR 30-59) ([facility])     Stroke ([facility])      mini stroke.     Tinnitus     Type 2 diabetes mellitus without complication ([facility])     Type II or unspecified type diabetes mellitus without mention of complication, not stated as uncontrolled     UTI (lower urinary tract infection)     Varicose vein        Past Surgical History:   Procedure Laterality Date    ABLATION OF DYSRHYTHMIC FOCUS  2000    CARPAL TUNNEL RELEASE Right     CATARACT REMOVAL WITH IMPLANT Bilateral     CHOLECYSTECTOMY  2007    COLONOSCOPY      COLONOSCOPY  04/10/2017    tubular adenomo polyp , mod. sigmoid diverticulosis, min. int. hemorrhoids    COLONOSCOPY N/A 3/2/2021    COLONOSCOPY WITH BIOPSY performed by Cruz Yao MD at [facility]  9/25/2013    DILATION AND CURETTAGE  1979    EYE SURGERY      laser    INCONTINENCE SURGERY      Percutaneous nerve stimulation Dr Shellie Estevez Nov 2013     INSERTABLE CARDIAC MONITOR  12/04/2015    [employer] REVEAL LINQ MODEL #[id number] MRI CONDTIONAL OK 3T. IMMEDIATELY POST IMPLANT    OTHER SURGICAL HISTORY  09/10/15    removal of interstim    WI COLSC FLX W/REMOVAL LESION BY HOT BX FORCEPS N/A 4/10/2017    COLONOSCOPY POLYPECTOMY HOT BIOPSY performed by Hattie Potter MD at [place] [address]  08/21/2017    UPPER GASTROINTESTINAL ENDOSCOPY  03/2016    Dr Farhan Ohara ENDOSCOPY N/A 3/2/2021    EGD BIOPSY performed by Cruz Yao MD at [address] [facility] ENDO       Allergies   Allergen Reactions    Robitussin [Guaifenesin] Anaphylaxis    Codeine Swelling    Compazine [Prochlorperazine Maleate] Hives and Swelling    Iodides Itching    Morphine Other (See Comments)     hallucinations    Moxifloxacin      Other reaction(s): Intolerance-unknown  Other reaction(s):  Intolerance-unknown    Reglan [Metoclopramide] Nausea Only    Avelox [Moxifloxacin Hcl In Nacl] Rash     Dermatitis      Cipro Xr Rash     dermatitis    Sulfa Antibiotics Rash    Zofran [Ondansetron Hcl] Nausea And Vomiting         Current Outpatient Medications:     oxybutynin (DITROPAN-XL) 5 MG extended release tablet, TAKE 1 TABLET BY MOUTH DAILY, Disp: 30 tablet, Rfl: 3    oxyCODONE-acetaminophen (PERCOCET) 5-325 MG per tablet, Take 1 tablet by mouth every 8 hours as needed for Pain for up to 30 days. Intended supply: 30 days. , Disp: 90 tablet, Rfl: 0    escitalopram (LEXAPRO) 20 MG tablet, Take 1 tablet by mouth in the morning., Disp: 90 tablet, Rfl: 1    budesonide-formoterol (SYMBICORT) 160-4.5 MCG/ACT AERO, Inhale 2 puffs into the lungs in the morning and 2 puffs before bedtime. As needed for sob., Disp: 10.2 g, Rfl: 2    metFORMIN (GLUCOPHAGE) 1000 MG tablet, Take 1 tablet by mouth in the morning and 1 tablet in the evening. Take with meals. , Disp: 180 tablet, Rfl: 3    blood glucose test strips (TRUE METRIX BLOOD GLUCOSE TEST) strip, THREE TIMES A DAY, Disp: 100 each, Rfl: 3    blood glucose test strips (ONETOUCH ULTRA) strip, TEST TWO (2) TO THREE (3) TIMES A DAY & AS NEEDED FOR SYMPTOMS OF IRREGULAR BLOOD GLUCOSE, Disp: 100 strip, Rfl: 0    blood glucose test strips (ONETOUCH ULTRA) strip, As needed. , Disp: 100 strip, Rfl: 0    furosemide (LASIX) 20 MG tablet, TAKE 1 TABLET BY MOUTH ONCE DAILY AS NEEDED, Disp: 30 tablet, Rfl: 0    vitamin B-12 (CYANOCOBALAMIN) 100 MCG tablet, take half a tablet BY MOUTH ONCE DAILY (Patient not taking: Reported on 8/15/2022), Disp: 30 tablet, Rfl: 0    Melatonin 10 MG TABS, Take 10 mg by mouth nightly, Disp: 90 tablet, Rfl: 3    dicyclomine (BENTYL) 10 MG capsule, TAKE 1 CAPSULE BY MOUTH TWICE A DAY AS NEEDED FOR ABDOMINAL SPASMS, Disp: 60 capsule, Rfl: 0     MG capsule, TAKE 1 CAPSULE BY MOUTH TWICE A DAY, Disp: 60 capsule, Rfl: 4    SM ASPIRIN ADULT LOW STRENGTH 81 MG EC tablet, TAKE 1 TABLET BY MOUTH ONCE DAILY (Patient not taking: Reported on 8/15/2022), Disp: 90 tablet, Rfl: 4    pantoprazole (PROTONIX) 40 MG tablet, TAKE 1 TABLET BY MOUTH TWICE A DAY (Patient not taking: Reported on 8/15/2022), Disp: 60 tablet, Rfl: 4    isosorbide dinitrate (ISORDIL) 30 MG tablet, TAKE 1 TABLET BY MOUTH ONCE DAILY, Disp: 30 tablet, Rfl: 4    Dulaglutide (TRULICITY) 1.5 KI/3.0XU SOPN, Inject 1.5 mg into the skin once a week, Disp: 4 pen, Rfl: 1    FEROSUL 325 (65 Fe) MG tablet, TAKE 1 TAB BY MOUTH IN THE MORNING, Disp: 90 tablet, Rfl: 2    clopidogrel (PLAVIX) 75 MG tablet, TAKE 1 TAB BY MOUTH ONCE A DAY ( IN THE MORNING ) -THIS MEDICINE MAY BE TAKENWITH OR WITHOUT FOOD, Disp: 90 tablet, Rfl: 1    fenofibrate (TRIGLIDE) 160 MG tablet, TAKE 1 TABLET BY MOUTH DAILY, Disp: 90 tablet, Rfl: 1    insulin aspart (NOVOLOG FLEXPEN) 100 UNIT/ML injection pen, IF<139 NO INSULIN; 140-199-2 UN;200-249-4 UN;250-299-6 UN;300-349-8 UN;350-400=10 UN;ABOVE 400-12 UNIT(S), Disp: 10 mL, Rfl: 3    magnesium oxide (MAG-OX) 400 MG tablet, , Disp: , Rfl:     insulin glargine (BASAGLAR KWIKPEN) 100 UNIT/ML injection pen, Inject 55 Units into the skin 2 times daily, Disp: 10 mL, Rfl: 2    losartan (COZAAR) 25 MG tablet, TAKE 1 TABLET BY MOUTH ONCE DAILY, Disp: 30 tablet, Rfl: 9    carvedilol (COREG) 3.125 MG tablet, TAKE 1 TAB BY MOUTH TWICE A DAY ( IN THE MORNING AND BEDTIME ), Disp: 180 tablet, Rfl: 3    Multiple Vitamins-Minerals (CERTAVITE/ANTIOXIDANTS) TABS, TAKE 1 TABLET BY MOUTH ONCE DAILY, Disp: 30 tablet, Rfl: 5    topiramate (TOPAMAX) 100 MG tablet, TAKE 1 TABLET BY MOUTH NIGHTLY , Disp: 90 tablet, Rfl: 2    atorvastatin (LIPITOR) 40 MG tablet, Take 1 tablet by mouth daily, Disp: 90 tablet, Rfl: 3    albuterol (PROVENTIL) (2.5 MG/3ML) 0.083% nebulizer solution, Take 3 mLs by nebulization every 6 hours as needed for Wheezing, Disp: 120 each, Rfl: 3    OXYGEN, Inhale 3 L into the lungs , Disp: , Rfl:     Family History   Problem Relation Age of Onset    Diabetes Mother     Heart Disease Mother     Stomach Cancer Father     Diabetes Maternal Grandmother         also aunts and uncles (maternal)    Emphysema Sister        Social History     Socioeconomic History    Marital status: Legally      Spouse name: Not on file    Number of children: Not on file    Years of education: Not on file    Highest education level: Not on file   Occupational History    Occupation: disabled     Employer: N/A   Tobacco Use    Smoking status: Former     Packs/day: 3.00     Years: 41.00     Pack years: 123.00     Types: Cigarettes     Quit date: 2000     Years since quittin.7    Smokeless tobacco: Never    Tobacco comments:     quit in    Vaping Use    Vaping Use: Never used   Substance and Sexual Activity    Alcohol use: No     Alcohol/week: 0.0 standard drinks    Drug use: No    Sexual activity: Not Currently   Other Topics Concern    Not on file   Social History Narrative    Not on file     Social Determinants of Health     Financial Resource Strain: Low Risk     Difficulty of Paying Living Expenses: Not hard at all   Food Insecurity: No Food Insecurity    Worried About Running Out of Food in the Last Year: Never true    Ran Out of Food in the Last Year: Never true   Transportation Needs: Not on file   Physical Activity: Not on file   Stress: Not on file   Social Connections: Not on file   Intimate Partner Violence: Not on file   Housing Stability: Not on file       Review of Systems:  Review of Systems   Constitutional: Negative for chills and fever. Cardiovascular:  Negative for chest pain and palpitations. Respiratory:  Negative for cough and shortness of breath. Musculoskeletal:  Positive for back pain. Gastrointestinal:  Negative for constipation. Neurological:  Positive for headaches. Negative for disturbances in coordination, loss of balance, numbness, tingling and weakness. Physical Exam:  BP (!) 82/49   Pulse 79   Temp 97.7 °F (36.5 °C)   Resp 20   LMP  (LMP Unknown)   SpO2 90%     Physical Exam  HENT:      Head: Normocephalic. Pulmonary:      Effort: Pulmonary effort is normal.   Musculoskeletal:         General: Normal range of motion. Cervical back: Normal range of motion.       Lumbar back: Tenderness present. Skin:     General: Skin is warm and dry. Neurological:      Mental Status: She is alert and oriented to person, place, and time. Record/Diagnostics Review:    Last greg 2/2022 and was appropriate     Assessment:  Problem List Items Addressed This Visit       Chronic bilateral low back pain with bilateral sciatica - Primary (Chronic)    Relevant Medications    oxyCODONE-acetaminophen (PERCOCET) 5-325 MG per tablet (Start on 10/15/2022)    DDD (degenerative disc disease), cervical (Chronic)    Relevant Medications    oxyCODONE-acetaminophen (PERCOCET) 5-325 MG per tablet (Start on 10/15/2022)    Lumbosacral spondylosis without myelopathy (Chronic)    Relevant Medications    oxyCODONE-acetaminophen (PERCOCET) 5-325 MG per tablet (Start on 10/15/2022)    Sacroiliitis, not elsewhere classified ([address] (Chronic)    Relevant Medications    oxyCODONE-acetaminophen (PERCOCET) 5-325 MG per tablet (Start on 10/15/2022)    Lumbar radiculopathy, chronic    Relevant Medications    oxyCODONE-acetaminophen (PERCOCET) 5-325 MG per tablet (Start on 10/15/2022)          Treatment Plan:  Patient relates current medications are helping the pain. Patient reports taking pain medications as prescribed, denies obtaining medications from different sources and denies use of illegal drugs. Patient denies side effects from medications like nausea, vomiting, constipation or drowsiness. Patient reports current activities of daily living are possible due to medications and would like to continue them. As always, we encourage daily stretching and strengthening exercises, and recommend minimizing use of pain medications unless patient cannot get through daily activities due to pain. Contract requirements met. Continue opioid therapy.  Script written for percocet  Follow up appointment made for 4 weeks    I have reviewed the chief complaint and history of present illness (including ROS and PFSH) and vital documentation by my staff and I agree with their documentation and have added where applicable.

## 2022-10-13 ENCOUNTER — OFFICE VISIT (OUTPATIENT)
Dept: PRIMARY CARE CLINIC | Age: 72
End: 2022-10-13
Payer: COMMERCIAL

## 2022-10-13 ENCOUNTER — TELEPHONE (OUTPATIENT)
Dept: PRIMARY CARE CLINIC | Age: 72
End: 2022-10-13

## 2022-10-13 VITALS
BODY MASS INDEX: 44.63 KG/M2 | DIASTOLIC BLOOD PRESSURE: 74 MMHG | WEIGHT: 244 LBS | OXYGEN SATURATION: 94 % | SYSTOLIC BLOOD PRESSURE: 120 MMHG | TEMPERATURE: 97.5 F | HEART RATE: 81 BPM

## 2022-10-13 DIAGNOSIS — E66.01 MORBID OBESITY WITH BMI OF 40.0-44.9, ADULT (HCC): ICD-10-CM

## 2022-10-13 DIAGNOSIS — F41.9 ANXIETY AND DEPRESSION: ICD-10-CM

## 2022-10-13 DIAGNOSIS — F32.A ANXIETY AND DEPRESSION: ICD-10-CM

## 2022-10-13 DIAGNOSIS — Z79.4 TYPE 2 DIABETES MELLITUS WITH DIABETIC POLYNEUROPATHY, WITH LONG-TERM CURRENT USE OF INSULIN (HCC): Primary | ICD-10-CM

## 2022-10-13 DIAGNOSIS — E11.42 TYPE 2 DIABETES MELLITUS WITH DIABETIC POLYNEUROPATHY, WITH LONG-TERM CURRENT USE OF INSULIN (HCC): Primary | ICD-10-CM

## 2022-10-13 DIAGNOSIS — J44.1 CHRONIC OBSTRUCTIVE PULMONARY DISEASE WITH ACUTE EXACERBATION (HCC): ICD-10-CM

## 2022-10-13 LAB — HBA1C MFR BLD: 13.9 %

## 2022-10-13 PROCEDURE — 1090F PRES/ABSN URINE INCON ASSESS: CPT | Performed by: NURSE PRACTITIONER

## 2022-10-13 PROCEDURE — 1123F ACP DISCUSS/DSCN MKR DOCD: CPT | Performed by: NURSE PRACTITIONER

## 2022-10-13 PROCEDURE — 83036 HEMOGLOBIN GLYCOSYLATED A1C: CPT | Performed by: NURSE PRACTITIONER

## 2022-10-13 PROCEDURE — 2022F DILAT RTA XM EVC RTNOPTHY: CPT | Performed by: NURSE PRACTITIONER

## 2022-10-13 PROCEDURE — G8427 DOCREV CUR MEDS BY ELIG CLIN: HCPCS | Performed by: NURSE PRACTITIONER

## 2022-10-13 PROCEDURE — G8417 CALC BMI ABV UP PARAM F/U: HCPCS | Performed by: NURSE PRACTITIONER

## 2022-10-13 PROCEDURE — G8399 PT W/DXA RESULTS DOCUMENT: HCPCS | Performed by: NURSE PRACTITIONER

## 2022-10-13 PROCEDURE — 3023F SPIROM DOC REV: CPT | Performed by: NURSE PRACTITIONER

## 2022-10-13 PROCEDURE — G8482 FLU IMMUNIZE ORDER/ADMIN: HCPCS | Performed by: NURSE PRACTITIONER

## 2022-10-13 PROCEDURE — 3046F HEMOGLOBIN A1C LEVEL >9.0%: CPT | Performed by: NURSE PRACTITIONER

## 2022-10-13 PROCEDURE — 99214 OFFICE O/P EST MOD 30 MIN: CPT | Performed by: NURSE PRACTITIONER

## 2022-10-13 PROCEDURE — 1036F TOBACCO NON-USER: CPT | Performed by: NURSE PRACTITIONER

## 2022-10-13 PROCEDURE — 3017F COLORECTAL CA SCREEN DOC REV: CPT | Performed by: NURSE PRACTITIONER

## 2022-10-13 RX ORDER — FUROSEMIDE 20 MG/1
TABLET ORAL
Qty: 30 TABLET | Refills: 0 | Status: SHIPPED | OUTPATIENT
Start: 2022-10-13

## 2022-10-13 RX ORDER — ESCITALOPRAM OXALATE 10 MG/1
10 TABLET ORAL DAILY
Qty: 90 TABLET | Refills: 1 | Status: SHIPPED | OUTPATIENT
Start: 2022-10-13 | End: 2023-01-11

## 2022-10-13 ASSESSMENT — ENCOUNTER SYMPTOMS
BACK PAIN: 1
CHEST TIGHTNESS: 0
SHORTNESS OF BREATH: 1
ABDOMINAL PAIN: 0
NAUSEA: 1
ABDOMINAL DISTENTION: 1
WHEEZING: 1
APNEA: 1

## 2022-10-13 NOTE — PROGRESS NOTES
[facility]  [id number] [facility] [address]  Dept: [phone]  Dept Fax: [phone]    Patient Care Team:  VERONIKA Ward CNP as PCP - General (Family Medicine)  VERONIKA Ward CNP as PCP - [facility]  Marcia Callaway MD as Consulting Physician (Urology)  Dinorah Buchanan MD as Consulting Physician (Pulmonology)  Bharathi Rain (Optometry)  VERONIKA Sage CNP as Nurse Practitioner (Certified Nurse Practitioner)  Gerhard Brown MD as Consulting Physician (Internal Medicine Cardiovascular Disease)  Lisa Piña MD as Consulting Physician (Internal Medicine Cardiovascular Disease)  Royer Gonzalez MD as Consulting Physician (Pain Management)  Alayna Bhat DPM as Consulting Physician (Lucianne Knee)  Chi Rae MD as Consulting Physician (Nephrology)    10/13/2022     Lenora Horowitz (:  )EM a 66 y.o. female, here for evaluation of the following medical concerns:   Chief Complaint   Patient presents with    Diabetes     3M F/U     Lenora Horowitz is a 69-year-old female here today for routine follow-up. Establish care on 8/15/2022. Does follow with cardiology. Established with pain management for chronic bilateral low back pain. At last visit Lexapro increased 20 mg, discussed adding Wellbutrin if needed. Also on Trulicity 1.5 mg weekly. Follows with Dr. Rumaldo Lennox, pulmonology, for COPD      Since last visit tried taking 20 mg dose, but it \"made me mean\"  Taking 10 mg a day but feels depression is well controlled        . Review of Systems   Constitutional:  Positive for activity change and fatigue. Negative for chills, fever and unexpected weight change. HENT: Negative. Respiratory:  Positive for apnea (sleep apnea), shortness of breath (on exertion) and wheezing. Negative for chest tightness.     Cardiovascular: Positive for leg swelling. Negative for chest pain and palpitations. Gastrointestinal:  Positive for abdominal distention and nausea. Negative for abdominal pain. Endocrine: Negative. Genitourinary:  Negative for dysuria and hematuria. Musculoskeletal:  Positive for arthralgias, back pain, gait problem and joint swelling. Skin:  Negative for rash and wound. Allergic/Immunologic: Positive for environmental allergies. Neurological:  Positive for numbness. Negative for headaches. Psychiatric/Behavioral:  Negative for dysphoric mood, sleep disturbance and suicidal ideas. The patient is nervous/anxious. Prior to Visit Medications    Medication Sig Taking? Authorizing Provider   escitalopram (LEXAPRO) 10 MG tablet Take 1 tablet by mouth daily Yes VERONIKA Banegas CNP   furosemide (LASIX) 20 MG tablet TAKE 1 TABLET BY MOUTH ONCE DAILY AS NEEDED Yes VERONIKA Banegas CNP   oxyCODONE-acetaminophen (PERCOCET) 5-325 MG per tablet Take 1 tablet by mouth every 8 hours as needed for Pain for up to 30 days. Intended supply: 30 days. Yes VERONIKA Pryor CNP   oxybutynin (DITROPAN-XL) 5 MG extended release tablet TAKE 1 TABLET BY MOUTH DAILY Yes VERONIKA Banegas CNP   budesonide-formoterol (SYMBICORT) 160-4.5 MCG/ACT AERO Inhale 2 puffs into the lungs in the morning and 2 puffs before bedtime. As needed for sob. Yes Adriana Randall MD   metFORMIN (GLUCOPHAGE) 1000 MG tablet Take 1 tablet by mouth in the morning and 1 tablet in the evening. Take with meals. Yes Adriana Randall MD   blood glucose test strips (TRUE METRIX BLOOD GLUCOSE TEST) strip THREE TIMES A DAY Yes Adriana Randall MD   blood glucose test strips (ONETOUCH ULTRA) strip TEST TWO (2) TO THREE (3) TIMES A DAY & AS NEEDED FOR SYMPTOMS OF IRREGULAR BLOOD GLUCOSE Yes Adriana Randall MD   blood glucose test strips (ONETOUCH ULTRA) strip As needed.  Yes Adriana Randall MD   Melatonin 10 MG TABS Take 10 mg by mouth nightly Yes Zhang Albright MD   dicyclomine (BENTYL) 10 MG capsule TAKE 1 CAPSULE BY MOUTH TWICE A DAY AS NEEDED FOR ABDOMINAL SPASMS Yes Zhang Albright MD    MG capsule TAKE 1 CAPSULE BY MOUTH TWICE A DAY Yes VERONIKA Howard CNP   pantoprazole (PROTONIX) 40 MG tablet TAKE 1 TABLET BY MOUTH TWICE A DAY Yes VERONIKA Howard CNP   isosorbide dinitrate (ISORDIL) 30 MG tablet TAKE 1 TABLET BY MOUTH ONCE DAILY Yes VERONIKA Howard CNP   Dulaglutide (TRULICITY) 1.5 MH/4.7EY SOPN Inject 1.5 mg into the skin once a week Yes VERONIKA Howard CNP   FEROSUL 325 (65 Fe) MG tablet TAKE 1 TAB BY MOUTH IN THE MORNING Yes VERONIKA Snowden CNP   clopidogrel (PLAVIX) 75 MG tablet TAKE 1 TAB BY MOUTH ONCE A DAY ( IN THE MORNING ) -THIS MEDICINE MAY BE TAKENWITH OR WITHOUT FOOD Yes VERONIKA Snowden CNP   fenofibrate (TRIGLIDE) 160 MG tablet TAKE 1 TABLET BY MOUTH DAILY Yes Lianet Mustafa MD   insulin aspart (NOVOLOG FLEXPEN) 100 UNIT/ML injection pen IF<139 NO INSULIN; 140-199-2 UN;200-249-4 CS;198-905-1 UZ;466-316-0 UN;350-400=10 UN;ABOVE 400-12 UNIT(S) Yes Lianet Mustafa MD   magnesium oxide (MAG-OX) 400 MG tablet  Yes Nick Mcknight MD   insulin glargine (BASAGLAR KWIKPEN) 100 UNIT/ML injection pen Inject 55 Units into the skin 2 times daily Yes Lianet Mustafa MD   losartan (COZAAR) 25 MG tablet TAKE 1 TABLET BY MOUTH ONCE DAILY Yes Lianet Mustafa MD   carvedilol (COREG) 3.125 MG tablet TAKE 1 TAB BY MOUTH TWICE A DAY ( IN THE MORNING AND BEDTIME ) Yes Lianet Mustafa MD   Multiple Vitamins-Minerals (CERTAVITE/ANTIOXIDANTS) TABS TAKE 1 TABLET BY MOUTH ONCE DAILY Yes Lianet Mustafa MD   topiramate (TOPAMAX) 100 MG tablet TAKE 1 TABLET BY MOUTH NIGHTLY  Yes Cole Cabrrea MD   atorvastatin (LIPITOR) 40 MG tablet Take 1 tablet by mouth daily Yes Lianet Mustafa MD   albuterol (PROVENTIL) (2.5 MG/3ML) 0.083% nebulizer solution Take 3 mLs by nebulization every 6 hours as needed for Wheezing Yes Bia Barkley MD   OXYGEN Inhale 3 L into the lungs  Yes Historical Provider, MD   vitamin B-12 (CYANOCOBALAMIN) 100 MCG tablet take half a tablet BY MOUTH ONCE DAILY  MD UZMA Paz ASPIRIN ADULT LOW STRENGTH 81 MG EC tablet TAKE 1 TABLET BY MOUTH ONCE DAILY  Deepti Carlson APRN - CNP        Social History     Tobacco Use    Smoking status: Former     Packs/day: 3.00     Years: 41.00     Pack years: 123.00     Types: Cigarettes     Quit date: 2000     Years since quittin.7    Smokeless tobacco: Never    Tobacco comments:     quit in    Substance Use Topics    Alcohol use: No     Alcohol/week: 0.0 standard drinks        Vitals:    10/13/22 0920   BP: 120/74   Site: Right Upper Arm   Position: Sitting   Pulse: 81   Temp: 97.5 °F (36.4 °C)   TempSrc: Infrared   SpO2: 94%   Weight: 244 lb (110.7 kg)     Estimated body mass index is 44.63 kg/m² as calculated from the following:    Height as of 22: 5' 2\" (1.575 m). Weight as of this encounter: 244 lb (110.7 kg). DIAGNOSTIC FINDINGS:  CBC:  Lab Results   Component Value Date/Time    WBC 6.2 2022 05:55 AM    HGB 12.4 2022 05:55 AM     2022 05:55 AM     2012 06:13 AM       BMP:    Lab Results   Component Value Date/Time     2022 05:55 AM    K 4.5 2022 05:55 AM     2022 05:55 AM    CO2 32 2022 05:55 AM    BUN 19 2022 05:55 AM    CREATININE 0.92 2022 05:55 AM    GLUCOSE 204 2022 05:55 AM    GLUCOSE 123 2012 06:13 AM       HEMOGLOBIN A1C:   Lab Results   Component Value Date/Time    LABA1C 13.9 10/13/2022 09:28 AM       FASTING LIPID PANEL:  Lab Results   Component Value Date    CHOL 211 (H) 2022    HDL 43 2022    TRIG 202 (H) 2022       Physical Exam  Vitals and nursing note reviewed. Constitutional:       Appearance: She is obese. HENT:      Head: Normocephalic and atraumatic.       Nose: Nose normal.      Mouth/Throat:      Mouth: Mucous membranes are moist.   Eyes:      Extraocular Movements: Extraocular movements intact. Pupils: Pupils are equal, round, and reactive to light. Cardiovascular:      Rate and Rhythm: Normal rate and regular rhythm. Heart sounds: Normal heart sounds. Pulmonary:      Effort: Pulmonary effort is normal. No respiratory distress. Breath sounds: Decreased air movement present. No stridor. Decreased breath sounds present. No wheezing or rhonchi. Abdominal:      General: Bowel sounds are normal. There is no distension. Palpations: Abdomen is soft. There is no mass. Tenderness: There is no abdominal tenderness. Hernia: No hernia is present. Musculoskeletal:      Cervical back: No deformity or tenderness. Decreased range of motion. Thoracic back: Deformity and spasms present. Normal range of motion. Lumbar back: Deformity, spasms and tenderness present. Decreased range of motion. Positive right straight leg raise test. Negative left straight leg raise test.      Right lower le+ Edema present. Left lower le+ Edema present. Lymphadenopathy:      Cervical: No cervical adenopathy. Skin:     General: Skin is warm. Neurological:      Mental Status: She is alert and oriented to person, place, and time. Cranial Nerves: No cranial nerve deficit. Motor: No weakness. Gait: Gait is intact. Gait normal.      Comments: Using motorized wheelchair   Psychiatric:         Attention and Perception: Attention and perception normal.         Mood and Affect: Mood normal. Mood is not anxious or depressed. Speech: She is communicative. Behavior: Behavior normal. Behavior is not agitated or slowed. ASSESSMENT     Diagnosis Orders   1.  Type 2 diabetes mellitus with diabetic polyneuropathy, with long-term current use of insulin ([facility])  POCT glycosylated hemoglobin (Hb A1C)    furosemide (LASIX) 20 MG tablet External Referral To Endocrinology      2. Chronic obstructive pulmonary disease with acute exacerbation ([facility]        3. Morbid obesity with BMI of 40.0-44.9, adult ([facility]        4. Anxiety and depression  escitalopram (LEXAPRO) 10 MG tablet             PLAN:  Orders Placed This Encounter   Medications    escitalopram (LEXAPRO) 10 MG tablet     Sig: Take 1 tablet by mouth daily     Dispense:  90 tablet     Refill:  1    furosemide (LASIX) 20 MG tablet     Sig: TAKE 1 TABLET BY MOUTH ONCE DAILY AS NEEDED     Dispense:  30 tablet     Refill:  0         Patient with worsening diabetic control, at this time patient advised to take 15 units mealtime insulin, continue with basal insulin twice daily and Trulicity. Referral placed to endocrinology  Patient feels depression anxiety well controlled, will continue escitalopram 10 mg, new order sent today for current dose      FOLLOW UP AND INSTRUCTIONS:  Return in about 3 months (around 1/13/2023) for Diabetes. Mariangel received counseling on the following healthy behaviors:nutrition and medication adherence    Discussed use, benefit, and side effects of prescribed medications. Barriers to  medication compliance addressed. All patient questions answered. Pt  verbalized understanding of all instructions given. Patient given educational materials - see patient instructions      Patient advised to contact scheduling offices for any referrals or imaging orders  placed today if they have not been contacted in 48 hours. Return in about 3 months (around 1/13/2023) for Diabetes. An electronic signature was used to authenticate this note. --VERONIKA Aldrich - CNP on 10/13/2022 at 12:29 PM  Visit Information    Have you changed or started any medications since your last visit including any over-the-counter medicines, vitamins, or herbal medicines?  no   Are you having any side effects from any of your medications? -  no  Have you stopped taking any of your medications? Is so, why? -  no    Have you seen any other physician or provider since your last visit? Yes - Records Obtained  Have you had any other diagnostic tests since your last visit? No  Have you been seen in the emergency room and/or had an admission to a hospital since we last saw you? No  Have you had your routine dental cleaning in the past 6 months? no    Have you activated your [employer] account? If not, what are your barriers?  Yes     Patient Care Team:  VERONIKA Robertson CNP as PCP - General (Family Medicine)  VERONIKA Robertson CNP as PCP - [facility]  Lito Mckinney MD as Consulting Physician (Urology)  Ruben Ryder MD as Consulting Physician (Pulmonology)  Hugo Jenkins (Optometry)  VERONIKA Alexander CNP as Nurse Practitioner (Certified Nurse Practitioner)  Kendell Navarrete MD as Consulting Physician (Internal Medicine Cardiovascular Disease)  Terrell Manzano MD as Consulting Physician (Internal Medicine Cardiovascular Disease)  Jihan Loya MD as Consulting Physician (Pain Management)  Stephy Pearson DPM as Consulting Physician (Podiatry)  Evita Vanessa MD as Consulting Physician (Nephrology)    Medical History Review  Past Medical, Family, and Social History reviewed and does not contribute to the patient presenting condition    Health Maintenance   Topic Date Due    DTaP/Tdap/Td vaccine (1 - Tdap) Never done    Diabetic retinal exam  10/29/2021    Annual Wellness Visit (AWV)  Never done    COVID-19 Vaccine (5 - Booster for Collie Piper series) 09/20/2022    Breast cancer screen  11/17/2022    A1C test (Diabetic or Prediabetic)  01/13/2023    Lipids  03/09/2023    Diabetic foot exam  05/06/2023    Depression Monitoring  05/10/2023    Colorectal Cancer Screen  03/02/2024    DEXA (modify frequency per FRAX score)  Completed    Flu vaccine  Completed    Pneumococcal 65+ years Vaccine  Completed    Hepatitis C screen  Completed    Hepatitis A vaccine  Aged Out    Hib vaccine  Aged Out    Meningococcal (ACWY) vaccine  Aged Out

## 2022-10-13 NOTE — TELEPHONE ENCOUNTER
[facility] Endo referral, demographics, last visit note including most recent HgbA1c faxed to [phone].

## 2022-10-20 DIAGNOSIS — Z79.4 TYPE 2 DIABETES MELLITUS WITH DIABETIC POLYNEUROPATHY, WITH LONG-TERM CURRENT USE OF INSULIN (HCC): Chronic | ICD-10-CM

## 2022-10-20 DIAGNOSIS — E11.42 TYPE 2 DIABETES MELLITUS WITH DIABETIC POLYNEUROPATHY, WITH LONG-TERM CURRENT USE OF INSULIN (HCC): Chronic | ICD-10-CM

## 2022-10-20 NOTE — TELEPHONE ENCOUNTER
Please Approve or Refuse.   Send to Pharmacy per Pt's Request:      Next Visit Date:  Visit date not found   Last Visit Date: 5/10/2022    Hemoglobin A1C (%)   Date Value   10/13/2022 13.9   07/08/2022 11.8   03/01/2022 12.5             ( goal A1C is < 7)   BP Readings from Last 3 Encounters:   10/13/22 120/74   10/06/22 (!) 82/49   08/15/22 130/75          (goal 120/80)  BUN   Date Value Ref Range Status   07/28/2022 19 8 - 23 mg/dL Final     Creatinine   Date Value Ref Range Status   07/28/2022 0.92 (H) 0.50 - 0.90 mg/dL Final     Potassium   Date Value Ref Range Status   07/28/2022 4.5 3.7 - 5.3 mmol/L Final

## 2022-10-29 DIAGNOSIS — Z79.4 TYPE 2 DIABETES MELLITUS WITH DIABETIC POLYNEUROPATHY, WITH LONG-TERM CURRENT USE OF INSULIN (HCC): ICD-10-CM

## 2022-10-29 DIAGNOSIS — E11.42 TYPE 2 DIABETES MELLITUS WITH DIABETIC POLYNEUROPATHY, WITH LONG-TERM CURRENT USE OF INSULIN (HCC): ICD-10-CM

## 2022-10-31 RX ORDER — DULAGLUTIDE 1.5 MG/.5ML
INJECTION, SOLUTION SUBCUTANEOUS
Qty: 4 ADJUSTABLE DOSE PRE-FILLED PEN SYRINGE | Refills: 11 | Status: SHIPPED | OUTPATIENT
Start: 2022-10-31

## 2022-10-31 NOTE — TELEPHONE ENCOUNTER
Health Maintenance   Topic Date Due    DTaP/Tdap/Td vaccine (1 - Tdap) Never done    Diabetic retinal exam  10/29/2021    Annual Wellness Visit (AWV)  Never done    COVID-19 Vaccine (5 - Booster for Moderna series) 07/15/2022    Breast cancer screen  11/17/2022    A1C test (Diabetic or Prediabetic)  01/13/2023    Lipids  03/09/2023    Diabetic foot exam  05/06/2023    Depression Monitoring  05/10/2023    Colorectal Cancer Screen  03/02/2024    DEXA (modify frequency per FRAX score)  Completed    Flu vaccine  Completed    Pneumococcal 65+ years Vaccine  Completed    Hepatitis C screen  Completed    Hepatitis A vaccine  Aged Out    Hib vaccine  Aged Out    Meningococcal (ACWY) vaccine  Aged Out             (applicable per patient's age: Cancer Screenings, Depression Screening, Fall Risk Screening, Immunizations)    Hemoglobin A1C (%)   Date Value   10/13/2022 13.9   07/08/2022 11.8   03/01/2022 12.5     Microalb/Crt.  Ratio (mcg/mg creat)   Date Value   03/09/2022 9     LDL Cholesterol (mg/dL)   Date Value   03/09/2022 128     AST (U/L)   Date Value   07/28/2022 26     ALT (U/L)   Date Value   07/28/2022 32     BUN (mg/dL)   Date Value   07/28/2022 19      (goal A1C is < 7)   (goal LDL is <100) need 30-50% reduction from baseline     BP Readings from Last 3 Encounters:   10/13/22 120/74   10/06/22 (!) 82/49   08/15/22 130/75    (goal /80)      All Future Testing planned in [facility]:  Lab Frequency Next Occurrence   NM LUNG SCAN PERFUSION ONLY Once 08/26/2022   Phase II - up with assistance PRN    Phase I - warming device PRN    Phase I - cardiac monitor PRN    Phase I & II - metered glucose PRN        Next Visit Date:  Future Appointments   Date Time Provider Clarence Mas   11/10/2022 10:00 AM VERONIKA Ward - CNP [id number] [id number]   1/19/2023  9:45 AM VERONIKA Araujo - CNP [facility] [address]            Patient Active Problem List:     Chronic pain of left knee     Type 2 diabetes mellitus with diabetic polyneuropathy, with long-term current use of insulin (HCC)     COPD (chronic obstructive pulmonary disease)     Nonintractable migraine, unspecified migraine type     Coronary artery disease due to lipid rich plaque     DDD (degenerative disc disease), lumbar     Chronic, continuous use of opioids     DDD (degenerative disc disease), cervical     Osteoarthritis of cervical spine     GERD (gastroesophageal reflux disease)     Essential hypertension     Mixed hyperlipidemia     Insomnia     Lumbosacral spondylosis without myelopathy     Sacroiliitis, not elsewhere classified ([facility]     Carpal tunnel syndrome     Mixed stress and urge urinary incontinence     Intractable migraine with aura without status migrainosus     Anxiety and depression     Chronic migraine without aura without status migrainosus, not intractable     Morbid (severe) obesity with alveolar hypoventilation (HCC)     Chronic nausea     Lung nodule     Neuropathy     Obstructive sleep apnea syndrome     Asthma with COPD ([facility]     Obesity, Class III, BMI 40-49.9 (morbid obesity) ([facility]     Stage 3 chronic kidney disease (HCC)     Benign hypertension with CKD (chronic kidney disease) stage III (HCC)     Secondary diabetes mellitus with stage 3 chronic kidney disease (GFR 30-59) (HCC)     CKD (chronic kidney disease) stage 3, GFR 30-59 ml/min     Lumbar radiculopathy, chronic     Sessile colonic polyp     Morbid obesity (HCC)     Adenomatous polyp of ascending colon     Irritable bowel syndrome with both constipation and diarrhea     Abdominal bloating     Major depressive disorder, single episode, unspecified     Permanent atrial fibrillation (HCC)     Polyneuropathy, unspecified     Atypical chest pain     Left ventricular diastolic dysfunction     Transaminasemia     Acute respiratory failure with hypoxia and hypercarbia (HCC)     Macrocytosis     Morbid obesity with BMI of 40.0-44.9, adult (HCC)     Chronic diastolic congestive heart failure (HCC)     Oxygen dependent     Chronic bilateral low back pain with bilateral sciatica     Left lower lobe pneumonia     Prolonged Q-T interval on ECG     Chronic respiratory failure with hypoxia (HCC)     Pneumonia

## 2022-11-01 ENCOUNTER — HOSPITAL ENCOUNTER (OUTPATIENT)
Age: 72
Setting detail: OBSERVATION
Discharge: HOME | End: 2022-11-02
Attending: EMERGENCY MEDICINE | Admitting: EMERGENCY MEDICINE
Payer: COMMERCIAL

## 2022-11-01 ENCOUNTER — APPOINTMENT (OUTPATIENT)
Dept: GENERAL RADIOLOGY | Age: 72
End: 2022-11-01
Payer: COMMERCIAL

## 2022-11-01 DIAGNOSIS — R00.2 PALPITATIONS: Primary | ICD-10-CM

## 2022-11-01 DIAGNOSIS — R73.9 HYPERGLYCEMIA: ICD-10-CM

## 2022-11-01 PROBLEM — R07.9 CHEST PAIN: Status: ACTIVE | Noted: 2022-11-01

## 2022-11-01 LAB
ABSOLUTE EOS #: 0.29 K/UL (ref 0–0.44)
ABSOLUTE IMMATURE GRANULOCYTE: 0.04 K/UL (ref 0–0.3)
ABSOLUTE LYMPH #: 1.57 K/UL (ref 1.1–3.7)
ABSOLUTE MONO #: 0.75 K/UL (ref 0.1–1.2)
ALBUMIN SERPL-MCNC: 3.3 G/DL (ref 3.5–5.2)
ALBUMIN/GLOBULIN RATIO: 1.2 (ref 1–2.5)
ALP BLD-CCNC: 61 U/L (ref 35–104)
ALT SERPL-CCNC: 27 U/L (ref 5–33)
ANION GAP SERPL CALCULATED.3IONS-SCNC: 11 MMOL/L (ref 9–17)
AST SERPL-CCNC: 32 U/L
BASOPHILS # BLD: 1 % (ref 0–2)
BASOPHILS ABSOLUTE: 0.08 K/UL (ref 0–0.2)
BILIRUB SERPL-MCNC: 0.2 MG/DL (ref 0.3–1.2)
BUN BLDV-MCNC: 19 MG/DL (ref 8–23)
CALCIUM SERPL-MCNC: 8.4 MG/DL (ref 8.6–10.4)
CHLORIDE BLD-SCNC: 98 MMOL/L (ref 98–107)
CO2: 27 MMOL/L (ref 20–31)
CREAT SERPL-MCNC: 1.09 MG/DL (ref 0.5–0.9)
D-DIMER QUANTITATIVE: 0.52 MG/L FEU
EOSINOPHILS RELATIVE PERCENT: 4 % (ref 1–4)
FLU A ANTIGEN: NEGATIVE
FLU B ANTIGEN: NEGATIVE
GFR SERPL CREATININE-BSD FRML MDRD: 54 ML/MIN/1.73M2
GLUCOSE BLD-MCNC: 297 MG/DL (ref 65–105)
GLUCOSE BLD-MCNC: 344 MG/DL (ref 70–99)
GLUCOSE BLD-MCNC: 394 MG/DL (ref 65–105)
GLUCOSE BLD-MCNC: 427 MG/DL (ref 65–105)
HCT VFR BLD CALC: 44.5 % (ref 36.3–47.1)
HEMOGLOBIN: 14.8 G/DL (ref 11.9–15.1)
IMMATURE GRANULOCYTES: 1 %
LIPASE: 69 U/L (ref 13–60)
LYMPHOCYTES # BLD: 20 % (ref 24–43)
MAGNESIUM: 1.6 MG/DL (ref 1.6–2.6)
MCH RBC QN AUTO: 31 PG (ref 25.2–33.5)
MCHC RBC AUTO-ENTMCNC: 33.3 G/DL (ref 28.4–34.8)
MCV RBC AUTO: 93.3 FL (ref 82.6–102.9)
MONOCYTES # BLD: 9 % (ref 3–12)
NRBC AUTOMATED: 0 PER 100 WBC
PDW BLD-RTO: 12.4 % (ref 11.8–14.4)
PLATELET # BLD: ABNORMAL K/UL (ref 138–453)
PLATELET, FLUORESCENCE: 202 K/UL (ref 138–453)
PLATELET, IMMATURE FRACTION: 16.6 % (ref 1.1–10.3)
POTASSIUM SERPL-SCNC: 4 MMOL/L (ref 3.7–5.3)
PRO-BNP: 176 PG/ML
RBC # BLD: 4.77 M/UL (ref 3.95–5.11)
REASON FOR REJECTION: NORMAL
SARS-COV-2, RAPID: NOT DETECTED
SEG NEUTROPHILS: 66 % (ref 36–65)
SEGMENTED NEUTROPHILS ABSOLUTE COUNT: 5.23 K/UL (ref 1.5–8.1)
SODIUM BLD-SCNC: 136 MMOL/L (ref 135–144)
SPECIMEN DESCRIPTION: NORMAL
TOTAL PROTEIN: 6.1 G/DL (ref 6.4–8.3)
TROPONIN, HIGH SENSITIVITY: 12 NG/L (ref 0–14)
TROPONIN, HIGH SENSITIVITY: 12 NG/L (ref 0–14)
TSH SERPL DL<=0.05 MIU/L-ACNC: 1.78 UIU/ML (ref 0.3–5)
WBC # BLD: 8 K/UL (ref 3.5–11.3)
ZZ NTE CLEAN UP: ORDERED TEST: NORMAL
ZZ NTE WITH NAME CLEAN UP: SPECIMEN SOURCE: NORMAL

## 2022-11-01 PROCEDURE — 83690 ASSAY OF LIPASE: CPT

## 2022-11-01 PROCEDURE — 96375 TX/PRO/DX INJ NEW DRUG ADDON: CPT

## 2022-11-01 PROCEDURE — G0378 HOSPITAL OBSERVATION PER HR: HCPCS

## 2022-11-01 PROCEDURE — 96366 THER/PROPH/DIAG IV INF ADDON: CPT

## 2022-11-01 PROCEDURE — 87804 INFLUENZA ASSAY W/OPTIC: CPT

## 2022-11-01 PROCEDURE — 93005 ELECTROCARDIOGRAM TRACING: CPT | Performed by: STUDENT IN AN ORGANIZED HEALTH CARE EDUCATION/TRAINING PROGRAM

## 2022-11-01 PROCEDURE — 85025 COMPLETE CBC W/AUTO DIFF WBC: CPT

## 2022-11-01 PROCEDURE — 84484 ASSAY OF TROPONIN QUANT: CPT

## 2022-11-01 PROCEDURE — 96361 HYDRATE IV INFUSION ADD-ON: CPT

## 2022-11-01 PROCEDURE — 82947 ASSAY GLUCOSE BLOOD QUANT: CPT

## 2022-11-01 PROCEDURE — 99285 EMERGENCY DEPT VISIT HI MDM: CPT

## 2022-11-01 PROCEDURE — 85379 FIBRIN DEGRADATION QUANT: CPT

## 2022-11-01 PROCEDURE — 85055 RETICULATED PLATELET ASSAY: CPT

## 2022-11-01 PROCEDURE — 83880 ASSAY OF NATRIURETIC PEPTIDE: CPT

## 2022-11-01 PROCEDURE — 71045 X-RAY EXAM CHEST 1 VIEW: CPT

## 2022-11-01 PROCEDURE — 80053 COMPREHEN METABOLIC PANEL: CPT

## 2022-11-01 PROCEDURE — 87635 SARS-COV-2 COVID-19 AMP PRB: CPT

## 2022-11-01 PROCEDURE — 96365 THER/PROPH/DIAG IV INF INIT: CPT

## 2022-11-01 PROCEDURE — 6370000000 HC RX 637 (ALT 250 FOR IP): Performed by: STUDENT IN AN ORGANIZED HEALTH CARE EDUCATION/TRAINING PROGRAM

## 2022-11-01 PROCEDURE — 83735 ASSAY OF MAGNESIUM: CPT

## 2022-11-01 PROCEDURE — 2580000003 HC RX 258: Performed by: STUDENT IN AN ORGANIZED HEALTH CARE EDUCATION/TRAINING PROGRAM

## 2022-11-01 PROCEDURE — 6360000002 HC RX W HCPCS: Performed by: STUDENT IN AN ORGANIZED HEALTH CARE EDUCATION/TRAINING PROGRAM

## 2022-11-01 PROCEDURE — 84443 ASSAY THYROID STIM HORMONE: CPT

## 2022-11-01 RX ORDER — ENOXAPARIN SODIUM 100 MG/ML
40 INJECTION SUBCUTANEOUS DAILY
Status: DISCONTINUED | OUTPATIENT
Start: 2022-11-02 | End: 2022-11-02 | Stop reason: HOSPADM

## 2022-11-01 RX ORDER — FENOFIBRATE 160 MG/1
160 TABLET ORAL DAILY
Status: DISCONTINUED | OUTPATIENT
Start: 2022-11-02 | End: 2022-11-02 | Stop reason: HOSPADM

## 2022-11-01 RX ORDER — OXYCODONE HYDROCHLORIDE AND ACETAMINOPHEN 5; 325 MG/1; MG/1
1 TABLET ORAL EVERY 8 HOURS PRN
Status: DISCONTINUED | OUTPATIENT
Start: 2022-11-01 | End: 2022-11-02 | Stop reason: HOSPADM

## 2022-11-01 RX ORDER — ISOSORBIDE DINITRATE 10 MG/1
30 TABLET ORAL DAILY
Status: DISCONTINUED | OUTPATIENT
Start: 2022-11-02 | End: 2022-11-02 | Stop reason: HOSPADM

## 2022-11-01 RX ORDER — INSULIN LISPRO 100 [IU]/ML
0-4 INJECTION, SOLUTION INTRAVENOUS; SUBCUTANEOUS NIGHTLY
Status: DISCONTINUED | OUTPATIENT
Start: 2022-11-01 | End: 2022-11-02 | Stop reason: HOSPADM

## 2022-11-01 RX ORDER — POLYETHYLENE GLYCOL 3350 17 G/17G
17 POWDER, FOR SOLUTION ORAL DAILY PRN
Status: DISCONTINUED | OUTPATIENT
Start: 2022-11-01 | End: 2022-11-02 | Stop reason: HOSPADM

## 2022-11-01 RX ORDER — SODIUM CHLORIDE 0.9 % (FLUSH) 0.9 %
5-40 SYRINGE (ML) INJECTION PRN
Status: DISCONTINUED | OUTPATIENT
Start: 2022-11-01 | End: 2022-11-02 | Stop reason: HOSPADM

## 2022-11-01 RX ORDER — ATORVASTATIN CALCIUM 40 MG/1
40 TABLET, FILM COATED ORAL DAILY
Status: DISCONTINUED | OUTPATIENT
Start: 2022-11-02 | End: 2022-11-02 | Stop reason: HOSPADM

## 2022-11-01 RX ORDER — POTASSIUM CHLORIDE 20 MEQ/1
40 TABLET, EXTENDED RELEASE ORAL PRN
Status: DISCONTINUED | OUTPATIENT
Start: 2022-11-01 | End: 2022-11-02 | Stop reason: HOSPADM

## 2022-11-01 RX ORDER — INSULIN LISPRO 100 [IU]/ML
0-16 INJECTION, SOLUTION INTRAVENOUS; SUBCUTANEOUS
Status: DISCONTINUED | OUTPATIENT
Start: 2022-11-02 | End: 2022-11-02 | Stop reason: HOSPADM

## 2022-11-01 RX ORDER — ONDANSETRON 2 MG/ML
4 INJECTION INTRAMUSCULAR; INTRAVENOUS EVERY 4 HOURS PRN
Status: DISCONTINUED | OUTPATIENT
Start: 2022-11-01 | End: 2022-11-02 | Stop reason: HOSPADM

## 2022-11-01 RX ORDER — CLOPIDOGREL BISULFATE 75 MG/1
75 TABLET ORAL DAILY
Status: DISCONTINUED | OUTPATIENT
Start: 2022-11-02 | End: 2022-11-02 | Stop reason: HOSPADM

## 2022-11-01 RX ORDER — INSULIN GLARGINE 100 [IU]/ML
50 INJECTION, SOLUTION SUBCUTANEOUS 2 TIMES DAILY
Status: DISCONTINUED | OUTPATIENT
Start: 2022-11-01 | End: 2022-11-02 | Stop reason: HOSPADM

## 2022-11-01 RX ORDER — 0.9 % SODIUM CHLORIDE 0.9 %
500 INTRAVENOUS SOLUTION INTRAVENOUS ONCE
Status: COMPLETED | OUTPATIENT
Start: 2022-11-01 | End: 2022-11-01

## 2022-11-01 RX ORDER — ACETAMINOPHEN 325 MG/1
650 TABLET ORAL EVERY 6 HOURS PRN
Status: DISCONTINUED | OUTPATIENT
Start: 2022-11-01 | End: 2022-11-02 | Stop reason: HOSPADM

## 2022-11-01 RX ORDER — TOPIRAMATE 100 MG/1
100 TABLET, FILM COATED ORAL NIGHTLY
Status: DISCONTINUED | OUTPATIENT
Start: 2022-11-01 | End: 2022-11-02 | Stop reason: HOSPADM

## 2022-11-01 RX ORDER — SODIUM CHLORIDE 0.9 % (FLUSH) 0.9 %
5-40 SYRINGE (ML) INJECTION EVERY 12 HOURS SCHEDULED
Status: DISCONTINUED | OUTPATIENT
Start: 2022-11-01 | End: 2022-11-02 | Stop reason: HOSPADM

## 2022-11-01 RX ORDER — PANTOPRAZOLE SODIUM 40 MG/1
40 TABLET, DELAYED RELEASE ORAL
Status: DISCONTINUED | OUTPATIENT
Start: 2022-11-02 | End: 2022-11-02 | Stop reason: HOSPADM

## 2022-11-01 RX ORDER — SODIUM CHLORIDE 9 MG/ML
INJECTION, SOLUTION INTRAVENOUS CONTINUOUS
Status: DISCONTINUED | OUTPATIENT
Start: 2022-11-01 | End: 2022-11-02 | Stop reason: HOSPADM

## 2022-11-01 RX ORDER — ONDANSETRON 2 MG/ML
4 INJECTION INTRAMUSCULAR; INTRAVENOUS ONCE
Status: COMPLETED | OUTPATIENT
Start: 2022-11-01 | End: 2022-11-01

## 2022-11-01 RX ORDER — DEXTROSE MONOHYDRATE 100 MG/ML
INJECTION, SOLUTION INTRAVENOUS CONTINUOUS PRN
Status: DISCONTINUED | OUTPATIENT
Start: 2022-11-01 | End: 2022-11-02 | Stop reason: HOSPADM

## 2022-11-01 RX ORDER — SODIUM CHLORIDE 9 MG/ML
25 INJECTION, SOLUTION INTRAVENOUS PRN
Status: DISCONTINUED | OUTPATIENT
Start: 2022-11-01 | End: 2022-11-02 | Stop reason: HOSPADM

## 2022-11-01 RX ORDER — ALBUTEROL SULFATE 2.5 MG/3ML
2.5 SOLUTION RESPIRATORY (INHALATION) EVERY 6 HOURS PRN
Status: DISCONTINUED | OUTPATIENT
Start: 2022-11-01 | End: 2022-11-02 | Stop reason: HOSPADM

## 2022-11-01 RX ORDER — LANOLIN ALCOHOL/MO/W.PET/CERES
325 CREAM (GRAM) TOPICAL
Status: DISCONTINUED | OUTPATIENT
Start: 2022-11-02 | End: 2022-11-02 | Stop reason: HOSPADM

## 2022-11-01 RX ORDER — MAGNESIUM SULFATE IN WATER 40 MG/ML
2000 INJECTION, SOLUTION INTRAVENOUS ONCE
Status: COMPLETED | OUTPATIENT
Start: 2022-11-01 | End: 2022-11-01

## 2022-11-01 RX ORDER — BUDESONIDE AND FORMOTEROL FUMARATE DIHYDRATE 160; 4.5 UG/1; UG/1
2 AEROSOL RESPIRATORY (INHALATION) 2 TIMES DAILY
Status: DISCONTINUED | OUTPATIENT
Start: 2022-11-01 | End: 2022-11-02 | Stop reason: HOSPADM

## 2022-11-01 RX ORDER — POTASSIUM CHLORIDE 7.45 MG/ML
10 INJECTION INTRAVENOUS PRN
Status: DISCONTINUED | OUTPATIENT
Start: 2022-11-01 | End: 2022-11-02 | Stop reason: HOSPADM

## 2022-11-01 RX ORDER — ASPIRIN 81 MG/1
324 TABLET, CHEWABLE ORAL ONCE
Status: COMPLETED | OUTPATIENT
Start: 2022-11-01 | End: 2022-11-01

## 2022-11-01 RX ORDER — ESCITALOPRAM OXALATE 10 MG/1
10 TABLET ORAL DAILY
Status: DISCONTINUED | OUTPATIENT
Start: 2022-11-02 | End: 2022-11-02 | Stop reason: HOSPADM

## 2022-11-01 RX ADMIN — SODIUM CHLORIDE, PRESERVATIVE FREE 10 ML: 5 INJECTION INTRAVENOUS at 22:24

## 2022-11-01 RX ADMIN — SODIUM CHLORIDE 500 ML: 0.9 INJECTION, SOLUTION INTRAVENOUS at 15:26

## 2022-11-01 RX ADMIN — SODIUM CHLORIDE 500 ML: 0.9 INJECTION, SOLUTION INTRAVENOUS at 14:19

## 2022-11-01 RX ADMIN — INSULIN GLARGINE 50 UNITS: 100 INJECTION, SOLUTION SUBCUTANEOUS at 22:42

## 2022-11-01 RX ADMIN — ASPIRIN 81 MG 324 MG: 81 TABLET ORAL at 19:34

## 2022-11-01 RX ADMIN — INSULIN LISPRO 4 UNITS: 100 INJECTION, SOLUTION INTRAVENOUS; SUBCUTANEOUS at 22:43

## 2022-11-01 RX ADMIN — TOPIRAMATE 100 MG: 100 TABLET, FILM COATED ORAL at 22:57

## 2022-11-01 RX ADMIN — ONDANSETRON 4 MG: 2 INJECTION INTRAMUSCULAR; INTRAVENOUS at 15:26

## 2022-11-01 RX ADMIN — SODIUM CHLORIDE: 9 INJECTION, SOLUTION INTRAVENOUS at 22:28

## 2022-11-01 RX ADMIN — MAGNESIUM SULFATE HEPTAHYDRATE 2000 MG: 40 INJECTION, SOLUTION INTRAVENOUS at 17:11

## 2022-11-01 ASSESSMENT — ENCOUNTER SYMPTOMS
COUGH: 0
EYE DISCHARGE: 0
BACK PAIN: 0
VOMITING: 0
ABDOMINAL PAIN: 0
NAUSEA: 0
DIARRHEA: 1
SHORTNESS OF BREATH: 1

## 2022-11-01 ASSESSMENT — PAIN SCALES - GENERAL: PAINLEVEL_OUTOF10: 0

## 2022-11-01 ASSESSMENT — HEART SCORE: ECG: 1

## 2022-11-01 NOTE — ED PROVIDER NOTES
[address] [facility]  Emergency Department  Emergency Medicine Resident Sign-out     Care of Deb Hartman was assumed from Dr. Linda Beaulieu and is being seen for Chest Pain, Palpitations, and Hyperglycemia  . The patient's initial evaluation and plan have been discussed with the prior provider who initially evaluated the patient. EMERGENCY DEPARTMENT COURSE / MEDICAL DECISION MAKING:       MEDICATIONS GIVEN:  Orders Placed This Encounter   Medications    0.9 % sodium chloride bolus    0.9 % sodium chloride bolus    ondansetron (ZOFRAN) injection 4 mg       LABS / RADIOLOGY:     Labs Reviewed   CBC WITH AUTO DIFFERENTIAL - Abnormal; Notable for the following components:       Result Value    Seg Neutrophils 66 (*)     Lymphocytes 20 (*)     Immature Granulocytes 1 (*)     All other components within normal limits   IMMATURE PLATELET FRACTION - Abnormal; Notable for the following components:    Platelet, Immature Fraction 16.6 (*)     All other components within normal limits   POC GLUCOSE FINGERSTICK - Abnormal; Notable for the following components:    POC Glucose 427 (*)     All other components within normal limits   D-DIMER, QUANTITATIVE   SPECIMEN REJECTION   TROPONIN   BRAIN NATRIURETIC PEPTIDE   COMPREHENSIVE METABOLIC PANEL   LIPASE   MAGNESIUM   PREVIOUS SPECIMEN   TROPONIN   TSH WITH REFLEX       XR CHEST PORTABLE    Result Date: 11/1/2022  EXAMINATION: ONE XRAY VIEW OF THE CHEST 11/1/2022 2:16 pm COMPARISON: 08/26/2022 HISTORY: Acute chest pain. FINDINGS: Normal cardiomediastinal silhouette. Cardiac loop recorder. Bibasilar atelectasis. No consolidation, pleural effusion, or pneumothorax. Bibasilar atelectasis. RECENT VITALS:     Temp: 98.5 °F (36.9 °C),  Heart Rate: 70, Resp: 17, BP: 94/60, SpO2: 95 %      This patient is a 66 y.o.  Female with past medical history of COPD, atrial fibrillation not on anticoagulation who presented for complaints of palpitation and shortness of breath that was worse with exertion. Patient does have home oxygen but does not wear it around all the time. Patient was also noted to be hypotensive with systolic blood pressures in the low 90s. She also endorses multiple episodes of diarrhea at home. Patient did receive fluid bolus, CBC unremarkable, D-dimer negative based on age-adjusted criteria. Awaiting laboratory work. Labs largely unremarkable patient did have improvement in her blood pressure with IV fluids. Given elevated heart score will plan for placement in the observation unit for cardiology evaluation. Home medications were reordered however did hold at home antihypertensives given initial low systolic blood pressure upon    History: 1  EC  Patient Age: 1  *Risk factors for Atherosclerotic disease: Coronary Artery Disease  Risk Factors: 2  Troponin: 0  Heart Score Total: 6      ED Course as of 22 1544   Tue 2022   1350 EKG Interpretation    Interpreted by emergency department physician    Rhythm: normal sinus   Rate: normal  Axis: left  Ectopy: none  Conduction: normal but low voltage  ST Segments: normal  T Waves: Diffuse flattening  Q Waves: PRWP    EKG  Impression: non-specific EKG     [WK]   1458 Based on age adjusted d dimer, pt is negative [AN]      ED Course User Index  [AN] Neyda Galdamez MD  [WK] Michell Lanza DO       OUTSTANDING TASKS / RECOMMENDATIONS:    Labs  Disposition     FINAL IMPRESSION:   No diagnosis found. DISPOSITION:         DISPOSITION:  []  Discharge   []  Transfer -    [x]  Admission -     []  Against Medical Advice   []  Eloped   FOLLOW-UP: No follow-up provider specified.    DISCHARGE MEDICATIONS: New Prescriptions    No medications on file          Dylon Khan DO  Emergency Medicine Resident  [facility]       Dylon Khan [place]  Resident  22

## 2022-11-01 NOTE — ED PROVIDER NOTES
[facility] [address]  ED  Emergency Department Encounter  Emergency Medicine Resident     Pt Name: Frederick Guajardo  MRN: [id number]  [place] 1950  Date of evaluation: 11/1/22  PCP:  VERONIKA Peters CNP    CHIEF COMPLAINT       Chief Complaint   Patient presents with    Chest Pain    Palpitations    Hyperglycemia       HISTORY OFPRESENT ILLNESS  (Location/Symptom, Timing/Onset, Context/Setting, Quality, Duration, Modifying Tg Evans.)      Frederick Guajardo is a 66 y. o.yo female who history of A. fib but not on any blood thinners, history of COPD. Patient presents with chest palpitations and shortness of breath. Patient states that the shortness of breath does start while she was moving around doing daily housework. She said at that time she put herself on supplemental oxygen which she only uses when she needs it. Patient states that right now she is having the chest pain and lightheaded. Patient states that over the past couple of days she has been having persistent diarrhea. She denies any fever, chills, cough, fever.       PAST MEDICAL / SURGICAL / SOCIAL / FAMILY HISTORY      has a past medical history of Abdominal bloating, Adenomatous polyp of ascending colon, Allergic rhinitis, Anxiety, Arthritis, Asthma, Atrial fibrillation (HCC), Back pain, Benign hypertension with CKD (chronic kidney disease) stage III ([address] CAD (coronary artery disease), Caffeine use, CHF (congestive heart failure) ([address] Chronic kidney disease, CKD (chronic kidney disease) stage 3, GFR 30-59 ml/min (HCC), COPD (chronic obstructive pulmonary disease) ([address] Degeneration of lumbar or lumbosacral intervertebral disc, Depression, DM (diabetes mellitus) ([address] Emphysema of lung ([address] Gastritis, GERD (gastroesophageal reflux disease), Hearing loss, Hematuria, Hiatal hernia, HTN (hypertension), benign, Hypertriglyceridemia, Incontinence of urine, Irritable bowel syndrome with both constipation and diarrhea, Knee arthropathy, Lumbosacral spondylosis without myelopathy, Migraine headache, Mumps, Neuropathy, Obesity, On home oxygen therapy, HIGINIO on CPAP, Otitis media, 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 Tinnitus, Type 2 diabetes mellitus without complication ([facility] Type II or unspecified type diabetes mellitus without mention of complication, not stated as uncontrolled, UTI (lower urinary tract infection), and Varicose vein. has a past surgical history that includes Cholecystectomy (); Carpal tunnel release (Right); Tympanoplasty; Dilation & curettage (); Cystocopy (2013); Cataract removal with implant (Bilateral); Tonsillectomy; Incontinence surgery; ablation of dysrhythmic focus (); Upper gastrointestinal endoscopy (2016); eye surgery; Tubal ligation; other surgical history (09/10/15); Insertable Cardiac Monitor (2015); Tympanoplasty; Colonoscopy; Colonoscopy (04/10/2017); pr colsc flx w/removal lesion by hot bx forceps (N/A, 4/10/2017); Tympanostomy tube placement (2017); Upper gastrointestinal endoscopy (N/A, 3/2/2021); and Colonoscopy (N/A, 3/2/2021).      Social History     Socioeconomic History    Marital status: Legally      Spouse name: Not on file    Number of children: Not on file    Years of education: Not on file    Highest education level: Not on file   Occupational History    Occupation: disabled     Employer: N/A   Tobacco Use    Smoking status: Former     Packs/day: 3.00     Years: 41.00     Pack years: 123.00     Types: Cigarettes     Quit date: 2000     Years since quittin.8    Smokeless tobacco: Never    Tobacco comments:     quit in    Vaping Use    Vaping Use: Never used   Substance and Sexual Activity    Alcohol use: No     Alcohol/week: 0.0 standard drinks    Drug use: No    Sexual activity: Not Currently   Other Topics Concern    Not on file   Social History Narrative Not on file     Social Determinants of Health     Financial Resource Strain: Low Risk     Difficulty of Paying Living Expenses: Not hard at all   Food Insecurity: No Food Insecurity    Worried About Running Out of Food in the Last Year: Never true    Ran Out of Food in the Last Year: Never true   Transportation Needs: Not on file   Physical Activity: Not on file   Stress: Not on file   Social Connections: Not on file   Intimate Partner Violence: Not on file   Housing Stability: Not on file       Family History   Problem Relation Age of Onset    Diabetes Mother     Heart Disease Mother     Stomach Cancer Father     Diabetes Maternal Grandmother         also aunts and uncles (maternal)    Emphysema Sister         Allergies:  Robitussin [guaifenesin], Codeine, Compazine [prochlorperazine maleate], Iodides, Morphine, Moxifloxacin, Reglan [metoclopramide], Avelox [moxifloxacin hcl in nacl], Cipro xr, Sulfa antibiotics, and Zofran [ondansetron hcl]    Home Medications:  Prior to Admission medications    Medication Sig Start Date End Date Taking? Authorizing Provider   TRULICITY 1.5 GH/1.5EQ SC injection INJECT ONE AND A HALF (1 & 1/2) MG INTO THE SKIN ONCE A WEEK STOP 0.75 10/31/22   VERONIKA Pritchard - CNP   metFORMIN (GLUCOPHAGE) 1000 MG tablet Take 1 tablet by mouth 2 times daily (with meals) 10/21/22 11/20/22  VERONIKA Escobar CNP   escitalopram (LEXAPRO) 10 MG tablet Take 1 tablet by mouth daily 10/13/22 1/11/23  VERONIKA Pritchard CNP   furosemide (LASIX) 20 MG tablet TAKE 1 TABLET BY MOUTH ONCE DAILY AS NEEDED 10/13/22   VERONIKA Pritchard CNP   oxyCODONE-acetaminophen (PERCOCET) 5-325 MG per tablet Take 1 tablet by mouth every 8 hours as needed for Pain for up to 30 days. Intended supply: 30 days.  10/15/22 11/14/22  VERONIKA Saeed CNP   oxybutynin (DITROPAN-XL) 5 MG extended release tablet TAKE 1 TABLET BY MOUTH DAILY 9/26/22   VERONIKA Pritchard CNP   budesonide-formoterol (SYMBICORT) 160-4.5 MCG/ACT AERO Inhale 2 puffs into the lungs in the morning and 2 puffs before bedtime. As needed for sob. 7/27/22   Trisha Shelby MD   blood glucose test strips (TRUE METRIX BLOOD GLUCOSE TEST) strip THREE TIMES A DAY 7/27/22   Trisha Shelby MD   blood glucose test strips (ONETOUCH ULTRA) strip TEST TWO (2) TO THREE (3) TIMES A DAY & AS NEEDED FOR SYMPTOMS OF IRREGULAR BLOOD GLUCOSE 7/27/22   Trisha Shelby MD   blood glucose test strips (ONETOUCH ULTRA) strip As needed.  7/27/22   Trisha Shelby MD   vitamin B-12 (CYANOCOBALAMIN) 100 MCG tablet take half a tablet BY MOUTH ONCE DAILY 7/27/22   Trisha Shelby MD   Melatonin 10 MG TABS Take 10 mg by mouth nightly 7/27/22   Trisha Shelby MD   dicyclomine (BENTYL) 10 MG capsule TAKE 1 CAPSULE BY MOUTH TWICE A DAY AS NEEDED FOR ABDOMINAL SPASMS 7/27/22   Trisha Shelby MD    MG capsule TAKE 1 CAPSULE BY MOUTH TWICE A DAY 7/18/22   VERONIKA León CNP   SM ASPIRIN ADULT LOW STRENGTH 81 MG EC tablet TAKE 1 TABLET BY MOUTH ONCE DAILY 7/18/22   VERONIKA León CNP   pantoprazole (PROTONIX) 40 MG tablet TAKE 1 TABLET BY MOUTH TWICE A DAY 7/18/22   VERONIKA León CNP   isosorbide dinitrate (ISORDIL) 30 MG tablet TAKE 1 TABLET BY MOUTH ONCE DAILY 7/18/22   VERONIKA León CNP   FEROSUL 325 (65 Fe) MG tablet TAKE 1 TAB BY MOUTH IN THE MORNING 6/20/22   VERONIKA Snowden CNP   clopidogrel (PLAVIX) 75 MG tablet TAKE 1 TAB BY MOUTH ONCE A DAY ( IN THE MORNING ) -THIS MEDICINE MAY BE TAKENWITH OR WITHOUT FOOD 5/25/22   VERONIKA Snowden CNP   fenofibrate (TRIGLIDE) 160 MG tablet TAKE 1 TABLET BY MOUTH DAILY 3/30/22   Jair Butt MD   insulin aspart (NOVOLOG FLEXPEN) 100 UNIT/ML injection pen IF<139 NO INSULIN; 140-199-2 UN;200-249-4 VU;621-768-6 RF;071-343-3 UN;350-400=10 UN;ABOVE 400-12 UNIT(S) 3/30/22   Jair Butt MD   magnesium oxide (MAG-OX) 400 MG tablet  2/21/22   Historical Provider, MD insulin glargine (BASAGLAR KWIKPEN) 100 UNIT/ML injection pen Inject 55 Units into the skin 2 times daily 3/1/22   Malone Mohs, MD   losartan (COZAAR) 25 MG tablet TAKE 1 TABLET BY MOUTH ONCE DAILY 2/21/22   Malone Mohs, MD   carvedilol (COREG) 3.125 MG tablet TAKE 1 TAB BY MOUTH TWICE A DAY ( IN THE MORNING AND BEDTIME ) 1/21/22   Malone Mohs, MD   Multiple Vitamins-Minerals (CERTAVITE/ANTIOXIDANTS) TABS TAKE 1 TABLET BY MOUTH ONCE DAILY 12/8/21   Malone Mohs, MD   topiramate (TOPAMAX) 100 MG tablet TAKE 1 TABLET BY MOUTH NIGHTLY  10/25/21   Yuval Riley MD   atorvastatin (LIPITOR) 40 MG tablet Take 1 tablet by mouth daily 2/28/21   Malone Mohs, MD   albuterol (PROVENTIL) (2.5 MG/3ML) 0.083% nebulizer solution Take 3 mLs by nebulization every 6 hours as needed for Wheezing 11/3/20   Preston Kauffman MD   OXYGEN Inhale 3 L into the lungs     Historical Provider, MD       REVIEW OFSYSTEMS    (2-9 systems for level 4, 10 or more for level 5)      Review of Systems   Constitutional:  Negative for fatigue and fever. HENT:  Negative for congestion. Eyes:  Negative for discharge. Respiratory:  Positive for shortness of breath. Negative for cough. Cardiovascular:  Positive for chest pain. Gastrointestinal:  Positive for diarrhea. Negative for abdominal pain, nausea and vomiting. Genitourinary:  Negative for flank pain and frequency. Musculoskeletal:  Negative for back pain and gait problem. Skin:  Negative for rash and wound. Neurological:  Negative for light-headedness and headaches. Psychiatric/Behavioral:  Negative for behavioral problems and confusion. PHYSICAL EXAM   (up to 7 for level 4, 8 or more forlevel 5)      INITIAL VITALS:   ED Triage Vitals [11/01/22 1344]   BP Temp Temp Source Heart Rate Resp SpO2 Height Weight   -- 98.5 °F (36.9 °C) Oral 81 17 99 % -- --       Physical Exam  Constitutional:       Appearance: She is obese.    HENT:      Head: Normocephalic and atraumatic. Mouth/Throat:      Mouth: Mucous membranes are dry. Eyes:      Extraocular Movements: Extraocular movements intact. Pupils: Pupils are equal, round, and reactive to light. Cardiovascular:      Rate and Rhythm: Normal rate. Pulses: Normal pulses. Pulmonary:      Effort: Pulmonary effort is normal. No respiratory distress. Abdominal:      General: There is no distension. Tenderness: There is no abdominal tenderness. Musculoskeletal:         General: No swelling. Normal range of motion. Cervical back: Normal range of motion. No rigidity. Skin:     General: Skin is warm. Capillary Refill: Capillary refill takes less than 2 seconds. Coloration: Skin is not jaundiced. Neurological:      General: No focal deficit present. Mental Status: She is alert and oriented to person, place, and time. Psychiatric:         Mood and Affect: Mood normal.         Behavior: Behavior normal.       DIFFERENTIAL  DIAGNOSIS     PLAN (LABS / IMAGING / EKG):  Orders Placed This Encounter   Procedures    COVID-19, Rapid    RAPID INFLUENZA A/B ANTIGENS    XR CHEST PORTABLE    Troponin    CBC with Auto Differential    D-Dimer, Quantitative    Immature Platelet Fraction    SPECIMEN REJECTION    Brain Natriuretic Peptide    Comprehensive Metabolic Panel    Lipase    Magnesium    PREVIOUS SPECIMEN    Troponin    TSH with Reflex    Cardiac Monitor    Pulse Oximetry    POC Glucose Fingerstick    EKG 12 Lead    Saline lock IV       MEDICATIONS ORDERED:  Orders Placed This Encounter   Medications    0.9 % sodium chloride bolus    0.9 % sodium chloride bolus    ondansetron (ZOFRAN) injection 4 mg         Initial MDM/Plan: 66 y.o. female who presents with chest pain, shortness of breath. Patient has been having diarrhea for the past couple days. She does appear clinically dry, she is hypotensive. I asked patient she says she is normally hyper tensive.     Impression is ACS versus PE and thus will obtain dimer. If positive will get CT chest.  Dehydration is also likelihood given dehydration. Will give patient fluids. We will likely admit her. DIAGNOSTIC RESULTS / EMERGENCYDEPARTMENT COURSE / MDM     LABS:  Labs Reviewed   CBC WITH AUTO DIFFERENTIAL - Abnormal; Notable for the following components:       Result Value    Seg Neutrophils 66 (*)     Lymphocytes 20 (*)     Immature Granulocytes 1 (*)     All other components within normal limits   IMMATURE PLATELET FRACTION - Abnormal; Notable for the following components:    Platelet, Immature Fraction 16.6 (*)     All other components within normal limits   POC GLUCOSE FINGERSTICK - Abnormal; Notable for the following components:    POC Glucose 427 (*)     All other components within normal limits   COVID-19, RAPID   RAPID INFLUENZA A/B ANTIGENS   D-DIMER, QUANTITATIVE   SPECIMEN REJECTION   BRAIN NATRIURETIC PEPTIDE   COMPREHENSIVE METABOLIC PANEL   LIPASE   MAGNESIUM   PREVIOUS SPECIMEN   TROPONIN   TSH WITH REFLEX         RADIOLOGY:  XR CHEST PORTABLE    Result Date: 11/1/2022  EXAMINATION: ONE XRAY VIEW OF THE CHEST 11/1/2022 2:16 pm COMPARISON: 08/26/2022 HISTORY: Acute chest pain. FINDINGS: Normal cardiomediastinal silhouette. Cardiac loop recorder. Bibasilar atelectasis. No consolidation, pleural effusion, or pneumothorax. Bibasilar atelectasis.         EKG      All EKG's are interpreted by the Emergency Department Physicianwho either signs or Co-signs this chart in the absence of a cardiologist.    EMERGENCY DEPARTMENT COURSE:  ED Course as of 11/01/22 1555   Tue Nov 01, 2022   1350 EKG Interpretation    Interpreted by emergency department physician    Rhythm: normal sinus   Rate: normal  Axis: left  Ectopy: none  Conduction: normal but low voltage  ST Segments: normal  T Waves: Diffuse flattening  Q Waves: PRWP    EKG  Impression: non-specific EKG     [WK]   1458 Based on age adjusted d dimer, pt is negative [AN]      ED Course User Index  [AN] Stella Garcia MD  [WK] Manny Duque DO          PROCEDURES:  None    CONSULTS:  None    CRITICAL CARE:      FINAL IMPRESSION      1. Palpitations    2. Hyperglycemia          DISPOSITION / PLAN     DISPOSITION        PATIENT REFERRED TO:  No follow-up provider specified.     DISCHARGE MEDICATIONS:  New Prescriptions    No medications on file       Stella Garcia MD  Emergency Medicine Resident    (Please note that portions of this note were completed with a voice recognition program.Efforts were made to edit the dictations but occasionally words are mis-transcribed.)        Stella Garcia MD  Resident  11/01/22 3898

## 2022-11-01 NOTE — ED PROVIDER NOTES
[facility]     Emergency Department     Faculty Note/ Attestation      Pt Name: Lowell Stewart                                       MRN: [id number]  [place] 1950  Date of evaluation: 11/1/2022    Patients PCP:    VERONIKA León - CNP    Attestation  I performed a history and physical examination of the patient and discussed management with the resident. I reviewed the residents note and agree with the documented findings and plan of care. Any areas of disagreement are noted on the chart. I was personally present for the key portions of any procedures. I have documented in the chart those procedures where I was not present during the key portions. I have reviewed the emergency nurses triage note. I agree with the chief complaint, past medical history, past surgical history, allergies, medications, social and family history as documented unless otherwise noted below. For Physician Assistant/ Nurse Practitioner cases/documentation I have personally evaluated this patient and have completed at least one if not all key elements of the E/M (history, physical exam, and MDM). Additional findings are as noted. Initial Screens:        Momence Coma Scale  Eye Opening: Spontaneous  Best Verbal Response: Oriented  Best Motor Response: Obeys commands  Momence Coma Scale Score: 15    Vitals:    Vitals:    11/01/22 1344 11/01/22 1347 11/01/22 1445   BP:  (!) 99/57 94/60   Pulse: 81  70   Resp: 17  17   Temp: 98.5 °F (36.9 °C)     TempSrc: Oral     SpO2: 99%  95%       CHIEF COMPLAINT       Chief Complaint   Patient presents with    Chest Pain    Palpitations    Hyperglycemia       Chest pain palpitations and hypotension with copious diarrhea for [facility]      EMERGENCY DEPARTMENT COURSE:     -------------------------  BP: 94/60, Temp: 98.5 °F (36.9 °C), Heart Rate: 70, Resp: 17  Pt dry but alert oriented x 4 clear lungs no signs of infection.       Comments  Will need fluid cardiac evaluation and care. ED Course as of 11/01/22 1548   e Nov 01, 2022   1350 EKG Interpretation    Interpreted by emergency department physician    Rhythm: normal sinus   Rate: normal  Axis: left  Ectopy: none  Conduction: normal but low voltage  ST Segments: normal  T Waves: Diffuse flattening  Q Waves: PRWP    EKG  Impression: non-specific EKG     [WK]   1458 Based on age adjusted d dimer, pt is negative [AN]      ED Course User Index  [AN] Kaitlin Sweeney MD  [WK] Yvonne Diss, DO Yvonne Mott, DO,, DO, RDMS.   Attending Emergency Physician          Yvonne Mott,   11/01/22 1549

## 2022-11-01 NOTE — ED NOTES
PT to ED for left sided chest pain that started this morning while watching TV. Pt reports feeling palpitations, hx of afib. Pt also reports hyperglycemia, states blood glucose was over 500 this morning. Pt states she took 12 units on Lantus prior to EMS arrival. BS of 427 on arrival to ED. Patient alert and oriented x4, talking in complete sentences. Respirations even and unlabored. Patient placed on continuous cardiac monitoring, BP cuff, and pulse ox. EKG obtained, blood work obtained.      Arline Flores RN  11/01/22 9453

## 2022-11-01 NOTE — ED PROVIDER NOTES
[address] [facility]  Emergency Department  Faculty Sign-Out Addendum     Care of Juani Lacey was assumed from previous attending and is being seen for Chest Pain, Palpitations, and Hyperglycemia  . The patient's initial evaluation and plan have been discussed with the prior provider who initially evaluated the patient. Handoff taken on the following patient from prior Attending Physician:    Marcos Goncalves    I was available and discussed any additional care issues that arose and coordinated the management plans with the resident(s) caring for the patient during my duty period. Any areas of disagreement with residents documentation of care or procedures are noted on the chart. I was personally present for the key portions of any/all procedures during my duty period. I have documented in the chart those procedures where I was not present during the key portions.       EMERGENCY DEPARTMENT COURSE / MEDICAL DECISION MAKING:       MEDICATIONS GIVEN:  Orders Placed This Encounter   Medications    0.9 % sodium chloride bolus    0.9 % sodium chloride bolus    ondansetron (ZOFRAN) injection 4 mg       LABS / RADIOLOGY:     Labs Reviewed   CBC WITH AUTO DIFFERENTIAL - Abnormal; Notable for the following components:       Result Value    Seg Neutrophils 66 (*)     Lymphocytes 20 (*)     Immature Granulocytes 1 (*)     All other components within normal limits   IMMATURE PLATELET FRACTION - Abnormal; Notable for the following components:    Platelet, Immature Fraction 16.6 (*)     All other components within normal limits   POC GLUCOSE FINGERSTICK - Abnormal; Notable for the following components:    POC Glucose 427 (*)     All other components within normal limits   COVID-19, RAPID   RAPID INFLUENZA A/B ANTIGENS   D-DIMER, QUANTITATIVE   SPECIMEN REJECTION   BRAIN NATRIURETIC PEPTIDE   COMPREHENSIVE METABOLIC PANEL   LIPASE   MAGNESIUM   PREVIOUS SPECIMEN   TROPONIN   TSH WITH REFLEX       XR CHEST PORTABLE    Result Date: 11/1/2022  EXAMINATION: ONE XRAY VIEW OF THE CHEST 11/1/2022 2:16 pm COMPARISON: 08/26/2022 HISTORY: Acute chest pain. FINDINGS: Normal cardiomediastinal silhouette. Cardiac loop recorder. Bibasilar atelectasis. No consolidation, pleural effusion, or pneumothorax. Bibasilar atelectasis. RECENT VITALS:     Temp: 98.5 °F (36.9 °C),  Heart Rate: 70, Resp: 17, BP: 94/60, SpO2: 95 %    This patient is a 66 y.o. Female with chest pain palpitations abdominal pain. Borderline hypotensive on arrival.  Undergoing broad laboratory work-up. Likely admission. Careful fluids.     OUTSTANDING TASKS / RECOMMENDATIONS:    Follow-up on lab results  Likely admission      Arnie Cohen MD, Joselito Mcghee  Attending Emergency Physician  [facility] ED       Sandhya Mclean MD  11/01/22 9245

## 2022-11-02 VITALS
HEART RATE: 79 BPM | OXYGEN SATURATION: 96 % | TEMPERATURE: 98 F | SYSTOLIC BLOOD PRESSURE: 111 MMHG | RESPIRATION RATE: 18 BRPM | DIASTOLIC BLOOD PRESSURE: 55 MMHG

## 2022-11-02 LAB
ABSOLUTE EOS #: 0.31 K/UL (ref 0–0.44)
ABSOLUTE IMMATURE GRANULOCYTE: 0.04 K/UL (ref 0–0.3)
ABSOLUTE LYMPH #: 1.7 K/UL (ref 1.1–3.7)
ABSOLUTE MONO #: 0.64 K/UL (ref 0.1–1.2)
ANION GAP SERPL CALCULATED.3IONS-SCNC: 10 MMOL/L (ref 9–17)
BASOPHILS # BLD: 1 % (ref 0–2)
BASOPHILS ABSOLUTE: 0.07 K/UL (ref 0–0.2)
BUN BLDV-MCNC: 20 MG/DL (ref 8–23)
CALCIUM SERPL-MCNC: 8.8 MG/DL (ref 8.6–10.4)
CHLORIDE BLD-SCNC: 98 MMOL/L (ref 98–107)
CO2: 28 MMOL/L (ref 20–31)
CREAT SERPL-MCNC: 1.06 MG/DL (ref 0.5–0.9)
EKG ATRIAL RATE: 75 BPM
EKG ATRIAL RATE: 77 BPM
EKG P AXIS: 66 DEGREES
EKG P AXIS: 80 DEGREES
EKG P-R INTERVAL: 126 MS
EKG P-R INTERVAL: 150 MS
EKG Q-T INTERVAL: 354 MS
EKG Q-T INTERVAL: 396 MS
EKG QRS DURATION: 70 MS
EKG QRS DURATION: 72 MS
EKG QTC CALCULATION (BAZETT): 400 MS
EKG QTC CALCULATION (BAZETT): 442 MS
EKG R AXIS: -44 DEGREES
EKG R AXIS: -44 DEGREES
EKG T AXIS: 102 DEGREES
EKG T AXIS: 91 DEGREES
EKG VENTRICULAR RATE: 75 BPM
EKG VENTRICULAR RATE: 77 BPM
EOSINOPHILS RELATIVE PERCENT: 5 % (ref 1–4)
GFR SERPL CREATININE-BSD FRML MDRD: 56 ML/MIN/1.73M2
GLUCOSE BLD-MCNC: 263 MG/DL (ref 65–105)
GLUCOSE BLD-MCNC: 275 MG/DL (ref 70–99)
GLUCOSE BLD-MCNC: 307 MG/DL (ref 65–105)
GLUCOSE BLD-MCNC: 405 MG/DL (ref 65–105)
HCT VFR BLD CALC: 41.1 % (ref 36.3–47.1)
HEMOGLOBIN: 13.5 G/DL (ref 11.9–15.1)
IMMATURE GRANULOCYTES: 1 %
LYMPHOCYTES # BLD: 26 % (ref 24–43)
MAGNESIUM: 2 MG/DL (ref 1.6–2.6)
MCH RBC QN AUTO: 30.8 PG (ref 25.2–33.5)
MCHC RBC AUTO-ENTMCNC: 32.8 G/DL (ref 28.4–34.8)
MCV RBC AUTO: 93.6 FL (ref 82.6–102.9)
MONOCYTES # BLD: 10 % (ref 3–12)
NRBC AUTOMATED: 0 PER 100 WBC
PDW BLD-RTO: 12.5 % (ref 11.8–14.4)
PLATELET # BLD: 165 K/UL (ref 138–453)
PMV BLD AUTO: 12.1 FL (ref 8.1–13.5)
POTASSIUM SERPL-SCNC: 4.4 MMOL/L (ref 3.7–5.3)
RBC # BLD: 4.39 M/UL (ref 3.95–5.11)
SEG NEUTROPHILS: 57 % (ref 36–65)
SEGMENTED NEUTROPHILS ABSOLUTE COUNT: 3.72 K/UL (ref 1.5–8.1)
SODIUM BLD-SCNC: 136 MMOL/L (ref 135–144)
TROPONIN, HIGH SENSITIVITY: 10 NG/L (ref 0–14)
WBC # BLD: 6.5 K/UL (ref 3.5–11.3)

## 2022-11-02 PROCEDURE — 2700000000 HC OXYGEN THERAPY PER DAY

## 2022-11-02 PROCEDURE — 97162 PT EVAL MOD COMPLEX 30 MIN: CPT

## 2022-11-02 PROCEDURE — 6360000002 HC RX W HCPCS: Performed by: EMERGENCY MEDICINE

## 2022-11-02 PROCEDURE — 96361 HYDRATE IV INFUSION ADD-ON: CPT

## 2022-11-02 PROCEDURE — 97535 SELF CARE MNGMENT TRAINING: CPT

## 2022-11-02 PROCEDURE — 6370000000 HC RX 637 (ALT 250 FOR IP): Performed by: STUDENT IN AN ORGANIZED HEALTH CARE EDUCATION/TRAINING PROGRAM

## 2022-11-02 PROCEDURE — 83735 ASSAY OF MAGNESIUM: CPT

## 2022-11-02 PROCEDURE — 93005 ELECTROCARDIOGRAM TRACING: CPT | Performed by: STUDENT IN AN ORGANIZED HEALTH CARE EDUCATION/TRAINING PROGRAM

## 2022-11-02 PROCEDURE — 36415 COLL VENOUS BLD VENIPUNCTURE: CPT

## 2022-11-02 PROCEDURE — 85025 COMPLETE CBC W/AUTO DIFF WBC: CPT

## 2022-11-02 PROCEDURE — G0378 HOSPITAL OBSERVATION PER HR: HCPCS

## 2022-11-02 PROCEDURE — 93010 ELECTROCARDIOGRAM REPORT: CPT | Performed by: INTERNAL MEDICINE

## 2022-11-02 PROCEDURE — 97530 THERAPEUTIC ACTIVITIES: CPT

## 2022-11-02 PROCEDURE — 94640 AIRWAY INHALATION TREATMENT: CPT

## 2022-11-02 PROCEDURE — 94761 N-INVAS EAR/PLS OXIMETRY MLT: CPT

## 2022-11-02 PROCEDURE — 2580000003 HC RX 258: Performed by: STUDENT IN AN ORGANIZED HEALTH CARE EDUCATION/TRAINING PROGRAM

## 2022-11-02 PROCEDURE — 97166 OT EVAL MOD COMPLEX 45 MIN: CPT

## 2022-11-02 PROCEDURE — 80048 BASIC METABOLIC PNL TOTAL CA: CPT

## 2022-11-02 PROCEDURE — 82947 ASSAY GLUCOSE BLOOD QUANT: CPT

## 2022-11-02 PROCEDURE — 84484 ASSAY OF TROPONIN QUANT: CPT

## 2022-11-02 RX ORDER — CHOLECALCIFEROL (VITAMIN D3) 125 MCG
5 CAPSULE ORAL NIGHTLY PRN
Status: DISCONTINUED | OUTPATIENT
Start: 2022-11-01 | End: 2022-11-02 | Stop reason: HOSPADM

## 2022-11-02 RX ORDER — ALBUTEROL SULFATE 2.5 MG/3ML
2.5 SOLUTION RESPIRATORY (INHALATION) EVERY 6 HOURS PRN
Status: DISCONTINUED | OUTPATIENT
Start: 2022-11-02 | End: 2022-11-02 | Stop reason: HOSPADM

## 2022-11-02 RX ADMIN — ESCITALOPRAM OXALATE 10 MG: 10 TABLET ORAL at 09:05

## 2022-11-02 RX ADMIN — OXYCODONE HYDROCHLORIDE AND ACETAMINOPHEN 1 TABLET: 5; 325 TABLET ORAL at 09:09

## 2022-11-02 RX ADMIN — BUDESONIDE AND FORMOTEROL FUMARATE DIHYDRATE 2 PUFF: 160; 4.5 AEROSOL RESPIRATORY (INHALATION) at 07:50

## 2022-11-02 RX ADMIN — SODIUM CHLORIDE, PRESERVATIVE FREE 10 ML: 5 INJECTION INTRAVENOUS at 09:06

## 2022-11-02 RX ADMIN — INSULIN GLARGINE 50 UNITS: 100 INJECTION, SOLUTION SUBCUTANEOUS at 09:06

## 2022-11-02 RX ADMIN — FERROUS SULFATE TAB EC 325 MG (65 MG FE EQUIVALENT) 325 MG: 325 (65 FE) TABLET DELAYED RESPONSE at 09:05

## 2022-11-02 RX ADMIN — ALBUTEROL SULFATE 2.5 MG: 2.5 SOLUTION RESPIRATORY (INHALATION) at 14:41

## 2022-11-02 RX ADMIN — CLOPIDOGREL 75 MG: 75 TABLET, FILM COATED ORAL at 09:05

## 2022-11-02 RX ADMIN — PANTOPRAZOLE SODIUM 40 MG: 40 TABLET, DELAYED RELEASE ORAL at 09:05

## 2022-11-02 RX ADMIN — INSULIN LISPRO 16 UNITS: 100 INJECTION, SOLUTION INTRAVENOUS; SUBCUTANEOUS at 12:23

## 2022-11-02 RX ADMIN — ACETAMINOPHEN 650 MG: 325 TABLET ORAL at 09:08

## 2022-11-02 RX ADMIN — DESMOPRESSIN ACETATE 40 MG: 0.2 TABLET ORAL at 09:05

## 2022-11-02 RX ADMIN — INSULIN LISPRO 12 UNITS: 100 INJECTION, SOLUTION INTRAVENOUS; SUBCUTANEOUS at 17:47

## 2022-11-02 RX ADMIN — FENOFIBRATE 160 MG: 160 TABLET ORAL at 09:05

## 2022-11-02 RX ADMIN — OXYCODONE HYDROCHLORIDE AND ACETAMINOPHEN 1 TABLET: 5; 325 TABLET ORAL at 17:52

## 2022-11-02 RX ADMIN — ISOSORBIDE DINITRATE 30 MG: 10 TABLET ORAL at 09:05

## 2022-11-02 ASSESSMENT — PAIN DESCRIPTION - LOCATION: LOCATION: HEAD

## 2022-11-02 ASSESSMENT — PAIN SCALES - GENERAL: PAINLEVEL_OUTOF10: 9

## 2022-11-02 NOTE — PROGRESS NOTES
Occupational Therapy  Facility/Department: [address] OBSERVATION  Occupational Therapy Initial Assessment    Name: Malachi Hernandez  : 1950  MRN: [id number]  Date of Service: 2022    Discharge Recommendations:  Patient would benefit from continued therapy after discharge          Patient Diagnosis(es): The primary encounter diagnosis was Palpitations. A diagnosis of Hyperglycemia was also pertinent to this visit. Past Medical History:  has a past medical history of Abdominal bloating, Adenomatous polyp of ascending colon, Allergic rhinitis, Anxiety, Arthritis, Asthma, Atrial fibrillation ([address] Back pain, Benign hypertension with CKD (chronic kidney disease) stage III ([address] CAD (coronary artery disease), Caffeine use, CHF (congestive heart failure) ([address] Chronic kidney disease, CKD (chronic kidney disease) stage 3, GFR 30-59 ml/min ([facility]), COPD (chronic obstructive pulmonary disease) ([address] Degeneration of lumbar or lumbosacral intervertebral disc, Depression, DM (diabetes mellitus) ([address] Emphysema of lung ([address] Gastritis, GERD (gastroesophageal reflux disease), Hearing loss, Hematuria, Hiatal hernia, HTN (hypertension), benign, Hypertriglyceridemia, Incontinence of urine, Irritable bowel syndrome with both constipation and diarrhea, Knee arthropathy, Lumbosacral spondylosis without myelopathy, Migraine headache, Mumps, Neuropathy, Obesity, On home oxygen therapy, HIGINIO on CPAP, Otitis media, Secondary diabetes mellitus with stage 3 chronic kidney disease (GFR 30-59) ([address] Stroke ([address] Tinnitus, Type 2 diabetes mellitus without complication ([address] Type II or unspecified type diabetes mellitus without mention of complication, not stated as uncontrolled, UTI (lower urinary tract infection), and Varicose vein. Past Surgical History:  has a past surgical history that includes Cholecystectomy (); Carpal tunnel release (Right); Tympanoplasty; Dilation & curettage ();  Cystocopy (2013); Cataract removal with implant (Bilateral); Tonsillectomy; Incontinence surgery; ablation of dysrhythmic focus (2000); Upper gastrointestinal endoscopy (03/2016); eye surgery; Tubal ligation; other surgical history (09/10/15); Insertable Cardiac Monitor (12/04/2015); Tympanoplasty; Colonoscopy; Colonoscopy (04/10/2017); pr colsc flx w/removal lesion by hot bx forceps (N/A, 4/10/2017); Tympanostomy tube placement (08/21/2017); Upper gastrointestinal endoscopy (N/A, 3/2/2021); and Colonoscopy (N/A, 3/2/2021). Assessment   Performance deficits / Impairments: Decreased functional mobility ; Decreased safe awareness;Decreased ADL status; Decreased endurance;Decreased strength;Decreased high-level IADLs  Prognosis: Good  Decision Making: Medium Complexity  REQUIRES OT FOLLOW-UP: Yes  Activity Tolerance  Activity Tolerance: Patient limited by fatigue;Patient Tolerated treatment well        Plan   Occupational Therapy Plan  Times Per Week: 3-4x     Restrictions  Restrictions/Precautions  Restrictions/Precautions: Up as Tolerated, Fall Risk, General Precautions  Required Braces or Orthoses?: No  Position Activity Restriction  Other position/activity restrictions: on 3L O2 entire session    Subjective   General  Patient assessed for rehabilitation services?: Yes  Family / Caregiver Present: No  Diagnosis: Chest pain, COPD, palpitations  Subjective  Subjective: 9/10 pain level-chronic flank spasms and low back pain     Social/Functional History  Social/Functional History  Lives With: Alone  Type of Home: Apartment  Home Layout: One level (8th floor)  Home Access: Elevator, Level entry  Bathroom Shower/Tub: Tub/Shower unit  Bathroom Toilet: Standard  Bathroom Equipment: Grab bars in shower, Shower chair, Hand-held shower  Home Equipment: Zoe Case, rolling, Electric scooter, Hospital bed, Lift chair, Oxygen (pt uses electric scooter often)  Receives Help From: Family, Friend(s), Home health (Aide services 5 days weekly)  ADL Assistance: Independent  Homemaking Assistance: Needs assistance  Homemaking Responsibilities: Yes (However aide performs most)  Ambulation Assistance: Independent  Transfer Assistance: Independent  Active : No  Patient's  Info: Cab for doctor appointments  Mode of Transportation: Cab  Occupation: Retired  Leisure & Hobbies: Likes to play cards       Objective   SpO2: 96 %  O2 Device: Nasal cannula 3L             Safety Devices  Type of Devices: Nurse notified; Left in bed;Call light within reach; Patient at risk for falls;Gait belt  Restraints  Restraints Initially in Place: No  Bed Mobility Training  Bed Mobility Training: Yes  Supine to Sit: Modified independent  Sit to Supine: Modified independent  Scooting: Independent  Balance  Sitting: Intact (Pt sat unsupported for ~25 min total this date while on shower chair/EOB/toilet)  Standing: Intact (Pt stood bedside/at toilet this date for total of ~8 min, SUP)  Transfer Training  Transfer Training: Yes  Sit to Stand: Modified independent  Stand to Sit: Modified independent  Toilet Transfer: Supervision  Gait  Overall Level of Assistance: Contact-guard assistance (Pt would strongly benefit from cane as pt preferring to hold onto writer)  Speed/Hetal: Slow;Shuffled  Assistive Device: Other (comment) (none used, on 3L O2)     AROM: Generally decreased, functional (L shoulder limited to 90 degrees at baseline)  PROM: Within functional limits  Strength: Generally decreased, functional (L shoulder 3-/5, R shoulder and B/L hands/elbows at 4/5 grossly)  Coordination: Within functional limits  Tone: Normal  Sensation: Intact  ADL  Feeding: Independent  Grooming: Modified independent   UE Bathing: Modified independent   LE Bathing: Supervision  UE Dressing: Modified independent   LE Dressing: Supervision  Toileting: Supervision  Additional Comments: Pt able to doff underwear prior to sitting on toilet to doff socks/gown, pt transferred into shower and sat for full UB/LB bathing activity, pt dried off and sat on toilet to dress self and placed pads in new underwear, pt becomes SOB quickly despite 3L O2 on for nearly entire session     Activity Tolerance  Activity Tolerance: Patient limited by fatigue;Patient limited by endurance        Vision  Vision: Impaired  Vision Exceptions: Wears glasses at all times  Hearing  Hearing: Exceptions to [facility]  Hearing Exceptions: Bilateral hearing aid;Hard of hearing/hearing concerns  Cognition  Overall Cognitive Status: Exceptions  Safety Judgement: Decreased awareness of need for assistance;Decreased awareness of need for safety  Cognition Comment: Pt can be impulsive, asked to sit EOB and wait for writer to obtain shower supplies-instead pt removes O2 and went to/from bathroom  Orientation  Overall Orientation Status: Within Functional Limits             Education Given To: Patient  Education Provided: Role of Therapy;Plan of Care;Energy Conservation  Education Provided Comments: Safety awareness  Education Method: Verbal  Barriers to Learning: None  Education Outcome: Verbalized understanding                  AM-PAC Score        AM-PAC Inpatient Daily Activity Raw Score: 20 (11/02/22 1632)  AM-PAC Inpatient ADL T-Scale Score : 42.03 (11/02/22 1632)  ADL Inpatient CMS 0-100% Score: 38.32 (11/02/22 1632)  ADL Inpatient CMS G-Code Modifier : Vivian Stafford (11/02/22 1632)    Goals  Short Term Goals  Time Frame for Short Term Goals: Pt will by discharge  Short Term Goal 1: demo good safety awareness during func mob around room using cane and mod I  Short Term Goal 2: demo ADL UB/LB bathing/dressing activity at (I)  Short Term Goal 3: demo/identify 2 EC/WS techniques during func activity with 1 vc only  Short Term Goal 4: demo BUE strength of 5/5 grossly for use in ADL performance     Therapy Time   Individual Concurrent Group Co-treatment   Time In 1500         Time Out 1544         Minutes 44         Timed Code Treatment Minutes: 38 Minutes       Camila Mendoza, OTR/L

## 2022-11-02 NOTE — DISCHARGE SUMMARY
CDU Discharge Summary        Patient:  Le Valencia  YOB: 1950    MRN: [id number]   Acct: [de-identified]    Primary Care Physician: VERONIKA Alexander CNP    Admit date:  11/1/2022  1:39 PM  Discharge date: 11/2/2022  7:06 PM     Discharge Diagnoses:     1.)  Acute chest pain secondary to unknown etiology. Improved with hydration, analgesics and rest.    Follow-up:  Call today/tomorrow for a follow up appointment with VERONIKA Alexander CNP , or return to the Emergency Room with worsening symptoms    Stressed to patient the importance of following up with primary care doctor for further workup/management of symptoms. Pt verbalizes understanding and agrees with plan. Discharge Medication Changes:       Medication List        CONTINUE taking these medications      albuterol (2.5 MG/3ML) 0.083% nebulizer solution  Commonly known as: PROVENTIL  Take 3 mLs by nebulization every 6 hours as needed for Wheezing     atorvastatin 40 MG tablet  Commonly known as: Lipitor  Take 1 tablet by mouth daily     Basaglar KwikPen 100 UNIT/ML injection pen  Generic drug: insulin glargine  Inject 55 Units into the skin 2 times daily     budesonide-formoterol 160-4.5 MCG/ACT Aero  Commonly known as: Symbicort  Inhale 2 puffs into the lungs in the morning and 2 puffs before bedtime. As needed for sob.      carvedilol 3.125 MG tablet  Commonly known as: COREG  TAKE 1 TAB BY MOUTH TWICE A DAY ( IN THE MORNING AND BEDTIME )     CertaVite/Antioxidants Tabs  TAKE 1 TABLET BY MOUTH ONCE DAILY     clopidogrel 75 MG tablet  Commonly known as: PLAVIX  TAKE 1 TAB BY MOUTH ONCE A DAY ( IN THE MORNING ) -THIS MEDICINE MAY BE TAKENWITH OR WITHOUT FOOD     dicyclomine 10 MG capsule  Commonly known as: BENTYL  TAKE 1 CAPSULE BY MOUTH TWICE A DAY AS NEEDED FOR ABDOMINAL SPASMS      MG capsule  Generic drug: docusate sodium  TAKE 1 CAPSULE BY MOUTH TWICE A DAY     escitalopram 10 MG tablet  Commonly known as: LEXAPRO  Take 1 tablet by mouth daily     fenofibrate 160 MG tablet  Commonly known as: TRIGLIDE  TAKE 1 TABLET BY MOUTH DAILY     FeroSul 325 (65 Fe) MG tablet  Generic drug: ferrous sulfate  TAKE 1 TAB BY MOUTH IN THE MORNING     furosemide 20 MG tablet  Commonly known as: LASIX  TAKE 1 TABLET BY MOUTH ONCE DAILY AS NEEDED     isosorbide dinitrate 30 MG tablet  Commonly known as: ISORDIL  TAKE 1 TABLET BY MOUTH ONCE DAILY     losartan 25 MG tablet  Commonly known as: COZAAR  TAKE 1 TABLET BY MOUTH ONCE DAILY     magnesium oxide 400 MG tablet  Commonly known as: MAG-OX     Melatonin 10 MG Tabs  Take 10 mg by mouth nightly     metFORMIN 1000 MG tablet  Commonly known as: GLUCOPHAGE  Take 1 tablet by mouth 2 times daily (with meals)     NovoLOG FlexPen 100 UNIT/ML injection pen  Generic drug: insulin aspart  IF<139 NO INSULIN; 140-199-2 UN;200-249-4 UN;250-299-6 UN;300-349-8 UN;350-400=10 UN;ABOVE 400-12 UNIT(S)     oxybutynin 5 MG extended release tablet  Commonly known as: DITROPAN-XL  TAKE 1 TABLET BY MOUTH DAILY     oxyCODONE-acetaminophen 5-325 MG per tablet  Commonly known as: Percocet  Take 1 tablet by mouth every 8 hours as needed for Pain for up to 30 days. Intended supply: 30 days. OXYGEN     pantoprazole 40 MG tablet  Commonly known as: PROTONIX  TAKE 1 TABLET BY MOUTH TWICE A DAY     SM Aspirin Adult Low Strength 81 MG EC tablet  Generic drug: aspirin  TAKE 1 TABLET BY MOUTH ONCE DAILY     topiramate 100 MG tablet  Commonly known as: TOPAMAX  TAKE 1 TABLET BY MOUTH NIGHTLY     * True Metrix Blood Glucose Test strip  Generic drug: blood glucose test strips  THREE TIMES A DAY     * OneTouch Ultra strip  Generic drug: blood glucose test strips  TEST TWO (2) TO THREE (3) TIMES A DAY & AS NEEDED FOR SYMPTOMS OF IRREGULAR BLOOD GLUCOSE     * OneTouch Ultra strip  Generic drug: blood glucose test strips  As needed.      Trulicity 1.5 JT/9.1MD SC injection  Generic drug: dulaglutide  INJECT ONE AND A HALF (1 & 1/2) MG INTO THE SKIN ONCE A WEEK STOP 0.75     vitamin B-12 100 MCG tablet  Commonly known as: CYANOCOBALAMIN  take half a tablet BY MOUTH ONCE DAILY           * This list has 3 medication(s) that are the same as other medications prescribed for you. Read the directions carefully, and ask your doctor or other care provider to review them with you. Diet:  ADULT DIET; Regular, advance as tolerated     Activity:  As tolerated    Consultants: IP CONSULT TO CARDIOLOGY    Procedures:  Not indicated     Diagnostic Test:   Results for orders placed or performed during the hospital encounter of 11/01/22   COVID-19, Rapid    Specimen: Nasopharyngeal Swab   Result Value Ref Range    Specimen Description . NASOPHARYNGEAL SWAB     SARS-CoV-2, Rapid Not Detected Not Detected   RAPID INFLUENZA A/B ANTIGENS    Specimen: Nasopharyngeal   Result Value Ref Range    Flu A Antigen NEGATIVE NEGATIVE    Flu B Antigen NEGATIVE NEGATIVE   CBC with Auto Differential   Result Value Ref Range    WBC 8.0 3.5 - 11.3 k/uL    RBC 4.77 3.95 - 5.11 m/uL    Hemoglobin 14.8 11.9 - 15.1 g/dL    Hematocrit 44.5 36.3 - 47.1 %    MCV 93.3 82.6 - 102.9 fL    MCH 31.0 25.2 - 33.5 pg    MCHC 33.3 28.4 - 34.8 g/dL    RDW 12.4 11.8 - 14.4 %    Platelets See Reflexed IPF Result 138 - 453 k/uL    NRBC Automated 0.0 0.0 per 100 WBC    Seg Neutrophils 66 (H) 36 - 65 %    Lymphocytes 20 (L) 24 - 43 %    Monocytes 9 3 - 12 %    Eosinophils % 4 1 - 4 %    Basophils 1 0 - 2 %    Immature Granulocytes 1 (H) 0 %    Segs Absolute 5.23 1.50 - 8.10 k/uL    Absolute Lymph # 1.57 1.10 - 3.70 k/uL    Absolute Mono # 0.75 0.10 - 1.20 k/uL    Absolute Eos # 0.29 0.00 - 0.44 k/uL    Basophils Absolute 0.08 0.00 - 0.20 k/uL    Absolute Immature Granulocyte 0.04 0.00 - 0.30 k/uL   D-Dimer, Quantitative   Result Value Ref Range    D-Dimer, Quant 0.52 mg/L FEU   Immature Platelet Fraction   Result Value Ref Range    Platelet, Immature Fraction 16.6 (H) 1.1 - 10.3 % Platelet, Fluorescence 202 138 - 453 k/uL   SPECIMEN REJECTION   Result Value Ref Range    Specimen Source . BLOOD     Ordered Test BNP CP LIP MG TROPI TSHX     Reason for Rejection Unable to perform testing: Specimen hemolyzed.     Brain Natriuretic Peptide   Result Value Ref Range    Pro- <300 pg/mL   Comprehensive Metabolic Panel   Result Value Ref Range    Glucose 344 (H) 70 - 99 mg/dL    BUN 19 8 - 23 mg/dL    Creatinine 1.09 (H) 0.50 - 0.90 mg/dL    Est, Glom Filt Rate 54 (L) >60 mL/min/1.73m2    Calcium 8.4 (L) 8.6 - 10.4 mg/dL    Sodium 136 135 - 144 mmol/L    Potassium 4.0 3.7 - 5.3 mmol/L    Chloride 98 98 - 107 mmol/L    CO2 27 20 - 31 mmol/L    Anion Gap 11 9 - 17 mmol/L    Alkaline Phosphatase 61 35 - 104 U/L    ALT 27 5 - 33 U/L    AST 32 (H) <32 U/L    Total Bilirubin 0.2 (L) 0.3 - 1.2 mg/dL    Total Protein 6.1 (L) 6.4 - 8.3 g/dL    Albumin 3.3 (L) 3.5 - 5.2 g/dL    Albumin/Globulin Ratio 1.2 1.0 - 2.5   Lipase   Result Value Ref Range    Lipase 69 (H) 13 - 60 U/L   Magnesium   Result Value Ref Range    Magnesium 1.6 1.6 - 2.6 mg/dL   Troponin   Result Value Ref Range    Troponin, High Sensitivity 12 0 - 14 ng/L   TSH with Reflex   Result Value Ref Range    TSH 1.78 0.30 - 5.00 uIU/mL   Troponin   Result Value Ref Range    Troponin, High Sensitivity 12 0 - 14 ng/L   Basic Metabolic Panel w/ Reflex to MG   Result Value Ref Range    Glucose 275 (H) 70 - 99 mg/dL    BUN 20 8 - 23 mg/dL    Creatinine 1.06 (H) 0.50 - 0.90 mg/dL    Est, Glom Filt Rate 56 (L) >60 mL/min/1.73m2    Calcium 8.8 8.6 - 10.4 mg/dL    Sodium 136 135 - 144 mmol/L    Potassium 4.4 3.7 - 5.3 mmol/L    Chloride 98 98 - 107 mmol/L    CO2 28 20 - 31 mmol/L    Anion Gap 10 9 - 17 mmol/L   CBC with Auto Differential   Result Value Ref Range    WBC 6.5 3.5 - 11.3 k/uL    RBC 4.39 3.95 - 5.11 m/uL    Hemoglobin 13.5 11.9 - 15.1 g/dL    Hematocrit 41.1 36.3 - 47.1 %    MCV 93.6 82.6 - 102.9 fL    MCH 30.8 25.2 - 33.5 pg    MCHC 32.8 28.4 - 34.8 g/dL    RDW 12.5 11.8 - 14.4 %    Platelets 738 122 - 203 k/uL    MPV 12.1 8.1 - 13.5 fL    NRBC Automated 0.0 0.0 per 100 WBC    Seg Neutrophils 57 36 - 65 %    Lymphocytes 26 24 - 43 %    Monocytes 10 3 - 12 %    Eosinophils % 5 (H) 1 - 4 %    Basophils 1 0 - 2 %    Immature Granulocytes 1 (H) 0 %    Segs Absolute 3.72 1.50 - 8.10 k/uL    Absolute Lymph # 1.70 1.10 - 3.70 k/uL    Absolute Mono # 0.64 0.10 - 1.20 k/uL    Absolute Eos # 0.31 0.00 - 0.44 k/uL    Basophils Absolute 0.07 0.00 - 0.20 k/uL    Absolute Immature Granulocyte 0.04 0.00 - 0.30 k/uL   Magnesium   Result Value Ref Range    Magnesium 2.0 1.6 - 2.6 mg/dL   Troponin   Result Value Ref Range    Troponin, High Sensitivity 10 0 - 14 ng/L   POC Glucose Fingerstick   Result Value Ref Range    POC Glucose 427 (HH) 65 - 105 mg/dL   POC Glucose Fingerstick   Result Value Ref Range    POC Glucose 297 (H) 65 - 105 mg/dL   POC Glucose Fingerstick   Result Value Ref Range    POC Glucose 394 (H) 65 - 105 mg/dL   POC Glucose Fingerstick   Result Value Ref Range    POC Glucose 263 (H) 65 - 105 mg/dL   POC Glucose Fingerstick   Result Value Ref Range    POC Glucose 405 (HH) 65 - 105 mg/dL   POC Glucose Fingerstick   Result Value Ref Range    POC Glucose 307 (H) 65 - 105 mg/dL   EKG 12 Lead   Result Value Ref Range    Ventricular Rate 77 BPM    Atrial Rate 77 BPM    P-R Interval 126 ms    QRS Duration 70 ms    Q-T Interval 354 ms    QTc Calculation (Bazett) 400 ms    P Axis 66 degrees    R Axis -44 degrees    T Axis 102 degrees   EKG 12 lead   Result Value Ref Range    Ventricular Rate 75 BPM    Atrial Rate 75 BPM    P-R Interval 150 ms    QRS Duration 72 ms    Q-T Interval 396 ms    QTc Calculation (Bazett) 442 ms    P Axis 80 degrees    R Axis -44 degrees    T Axis 91 degrees     XR CHEST PORTABLE    Result Date: 11/1/2022  EXAMINATION: ONE XRAY VIEW OF THE CHEST 11/1/2022 2:16 pm COMPARISON: 08/26/2022 HISTORY: Acute chest pain.  FINDINGS: Normal cardiomediastinal silhouette. Cardiac loop recorder. Bibasilar atelectasis. No consolidation, pleural effusion, or pneumothorax. Bibasilar atelectasis. Physical Exam:    General appearance - NAD, AOx 3   Lungs -CTAB, no R/R/R  Heart - RRR, no M/R/G  Abdomen - Soft, NT/ND  Neurological:  MAEx4, No focal motor deficit, sensory loss  Extremities - Cap refil <2 sec in all ext., no edema  Skin -warm, dry      Hospital Course:  Clinical course has improved, labs and imaging reviewed. Britney Sarkar originally presented to the hospital on 11/1/2022  1:39 PM with complaints of chest pain with associated palpitations and shortness of breath on exertion. At that time it was determined that She required further observation and cardiac evaluation. Labs and imaging were followed daily. Imaging results as above. She is medically stable to be discharged. Patient educated to follow-up with her cardiology within 1 week and to also follow-up with PCP within 1 week. Patient instructed to return to the emergency department with any returning or worsening symptoms. Disposition: Home    Patient stated that they will not drive themselves home from the hospital if they have gotten pain killers/ narcotics earlier that day and that they will arrange for transportation on their own or work with the  for a ride. Patient counseled NOT to drive while under the influence of narcotics/ pain killers. Condition: Good    Patient stable and ready for discharge home. I have discussed plan of care with patient and they are in understanding. They were instructed to read discharge paperwork. All of their questions and concerns were addressed.      Time Spent: 0 day      --  My Supervising Physician for today is  Dr. Manuel Hudson  We discussed and agreed upon a treatment plan   Raegan Sahu, [address] [facility]  Emergency Medicine Attending Physician    This dictation was generated by voice recognition computer software. Although all attempts are made to edit the dictation for accuracy, there may be errors in the transcription that are not intended.

## 2022-11-02 NOTE — PROGRESS NOTES
[address]  CDU / OBSERVATION ENCOUNTER  ATTENDING NOTE       I performed a history and physical examination of the patient and discussed management with the resident or midlevel provider. I reviewed the resident or midlevel provider's note and agree with the documented findings and plan of care. Any areas of disagreement are noted on the chart. I was personally present for the key portions of any procedures. I have documented in the chart those procedures where I was not present during the key portions. I have reviewed the nurses notes. I agree with the chief complaint, past medical history, past surgical history, allergies, medications, social and family history as documented unless otherwise noted below. The Family history, social history, and ROS are effectively unchanged since admission unless noted elsewhere in the chart. Patient with pleuritic type symptoms. Patient with recent testing. Seen by attending cardiologist for chest discomfort now. Patient with reproducible discomfort. Patient kept for reevaluation. Patient's previous studies were reviewed by cardiology. No further testing appropriate or necessary in this patient today. Enzymes and EKG negative. Patient for close outpatient follow-up. Patient for reassessment after aerosols here. On exam patient had reproducible chest wall pain. Patient also had aerosols given which relieved the symptoms. Patient was rechecked and felt to be appropriate for discharge after cardiac clearance. Patient for outpatient follow-up scheduled.     Amber Leach MD  Attending Emergency  Physician

## 2022-11-02 NOTE — PROGRESS NOTES
Physical Therapy  Facility/Department: [address] OBSERVATION  Physical Therapy Initial Assessment    Name: Malachi Hernandez  : 1950  MRN: [id number]  Date of Service: 2022  Chief Complaint   Patient presents with    Chest Pain    Palpitations    Hyperglycemia      Discharge Recommendations:  Patient would benefit from continued therapy after discharge      Patient Diagnosis(es): The primary encounter diagnosis was Palpitations. A diagnosis of Hyperglycemia was also pertinent to this visit. Past Medical History:  has a past medical history of Abdominal bloating, Adenomatous polyp of ascending colon, Allergic rhinitis, Anxiety, Arthritis, Asthma, Atrial fibrillation ([address] Back pain, Benign hypertension with CKD (chronic kidney disease) stage III ([address] CAD (coronary artery disease), Caffeine use, CHF (congestive heart failure) ([address] Chronic kidney disease, CKD (chronic kidney disease) stage 3, GFR 30-59 ml/min ([facility]), COPD (chronic obstructive pulmonary disease) ([address] Degeneration of lumbar or lumbosacral intervertebral disc, Depression, DM (diabetes mellitus) ([address] Emphysema of lung ([address] Gastritis, GERD (gastroesophageal reflux disease), Hearing loss, Hematuria, Hiatal hernia, HTN (hypertension), benign, Hypertriglyceridemia, Incontinence of urine, Irritable bowel syndrome with both constipation and diarrhea, Knee arthropathy, Lumbosacral spondylosis without myelopathy, Migraine headache, Mumps, Neuropathy, Obesity, On home oxygen therapy, HIGINIO on CPAP, Otitis media, Secondary diabetes mellitus with stage 3 chronic kidney disease (GFR 30-59) ([address] Stroke ([address] Tinnitus, Type 2 diabetes mellitus without complication ([address] Type II or unspecified type diabetes mellitus without mention of complication, not stated as uncontrolled, UTI (lower urinary tract infection), and Varicose vein. Past Surgical History:  has a past surgical history that includes Cholecystectomy ();  Carpal tunnel release (Right); Tympanoplasty; Dilation & curettage (1979); Cystocopy (9/25/2013); Cataract removal with implant (Bilateral); Tonsillectomy; Incontinence surgery; ablation of dysrhythmic focus (2000); Upper gastrointestinal endoscopy (03/2016); eye surgery; Tubal ligation; other surgical history (09/10/15); Insertable Cardiac Monitor (12/04/2015); Tympanoplasty; Colonoscopy; Colonoscopy (04/10/2017); pr colsc flx w/removal lesion by hot bx forceps (N/A, 4/10/2017); Tympanostomy tube placement (08/21/2017); Upper gastrointestinal endoscopy (N/A, 3/2/2021); and Colonoscopy (N/A, 3/2/2021). Assessment   Body Structures, Functions, Activity Limitations Requiring Skilled Therapeutic Intervention: Decreased functional mobility ; Decreased strength;Decreased endurance  Assessment: The pt ambulated 25 ft with a RW x CGA. She was mildly SOB following ambulation.  She could benefit from a continuation of PT for gait and strengthening to address her deficits  Therapy Prognosis: Good  Decision Making: Medium Complexity  Requires PT Follow-Up: Yes  Activity Tolerance  Activity Tolerance: Patient limited by fatigue;Patient limited by endurance     Plan   Physcial Therapy Plan  General Plan:  (5-6x wk)  Current Treatment Recommendations: Strengthening, Functional mobility training, Transfer training, Endurance training, Stair training, Gait training, Safety education & training  Safety Devices  Type of Devices: Nurse notified, Left in bed, Call light within reach     Restrictions  Restrictions/Precautions  Restrictions/Precautions: Up as Tolerated  Required Braces or Orthoses?: No     Subjective   General  Patient assessed for rehabilitation services?: Yes  Response To Previous Treatment: Not applicable  Family / Caregiver Present: No  Follows Commands: Within Functional Limits  Subjective  Subjective: Pt c/o 6/10 chest pain     Social/Functional History  Social/Functional History  Lives With: Alone  Type of Home: [address] [place] [address]  Layout: One level (5th fl apt)  Home Access: Elevator, Level entry  Bathroom Shower/Tub: Tub/Shower unit  Bathroom Toilet: Standard  Home Equipment: [employer], rolling, Electric scooter  Receives Help From: Family, Friend(s)  ADL Assistance: Independent  Homemaking Assistance: Independent  Homemaking Responsibilities: Yes  Ambulation Assistance: Independent  Transfer Assistance: Independent  Active : No  Patient's  Info: Cab for doctor appointments  Mode of Transportation: [facility]  Occupation: Retired  Leisure & Hobbies: Likes to play cards  [address]: Impaired  Vision Exceptions: Wears glasses at all times  Hearing  Hearing: Exceptions to [employer] Exceptions: Bilateral hearing aid    Cognition   Orientation  Overall Orientation Status: Within Functional Limits  Cognition  Overall Cognitive Status: WFL     Objective   Heart Rate: 77  Heart Rate Source: Monitor  BP:   MAP (Calculated): 73.67  Resp: 18  SpO2: 96 %  O2 Device: Nasal cannula       AROM RLE (degrees)  RLE AROM: WNL  AROM LLE (degrees)  LLE AROM : WNL  AROM RUE (degrees)  RUE AROM : WNL  AROM LUE (degrees)  LUE AROM : WNL  Strength RLE  Strength RLE: WFL  Strength LLE  Strength LLE: WFL  Strength RUE  Strength RUE: WFL  Strength LUE  Strength LUE: WFL        Bed mobility  Supine to Sit: Minimal assistance  Sit to Supine: Minimal assistance  Scooting: Minimal assistance  Transfers  Sit to Stand: Minimal Assistance  Stand to Sit: Minimal Assistance  Ambulation  Surface: Level tile  Device: Rolling Walker  Assistance: Contact guard assistance  Gait Deviations: Decreased step height;Decreased step length  Distance: amb 25 ft with a RW x CGA  Comments:  The pt is on 2L of 02     Balance  Posture: Good  Sitting - Static: Good  Sitting - Dynamic: Fair  Standing - Static: Good  Standing - Dynamic: Fair   OutComes Score       AM-PAC Score  AM-PAC Inpatient Mobility Raw Score : 19 (11/02/22 1109)  AM-PAC Inpatient T-Scale Score : 45.44 (11/02/22 1109)  Mobility Inpatient CMS 0-100% Score: 41.77 (11/02/22 1109)  Mobility Inpatient CMS G-Code Modifier : CK (11/02/22 1109)      Tinneti Score     Goals  Short Term Goals  Time Frame for Short Term Goals: 10 visits  Short Term Goal 1: transfers with SBA  Short Term Goal 2: amb 150 ft with a RW x SBA  Short Term Goal 3: ascend/descend 4 steps with SBA  Short Term Goal 4: 20 min strengthening exercise program x SBA  Patient Goals   Patient Goals : Return home     Education  Patient Education  Education Given To: Patient  Education Provided: Role of Therapy;Plan of Care  Education Method: Verbal  Barriers to Learning: None  Education Outcome: Verbalized understanding      Therapy Time   Individual Concurrent Group Co-treatment   Time In 0800         Time Out 0823         Minutes 23             1 of [facility], PT

## 2022-11-02 NOTE — H&P
[facility]  CDU / OBSERVATION eNCOUnter  Resident Note     Pt Name: Melba Clement  MRN: [id number]  [place] 1950  Date of evaluation: 11/2/22  Patient's PCP is :  VERONIKA Kim - CNP    CHIEF COMPLAINT       Chief Complaint   Patient presents with    Chest Pain    Palpitations    Hyperglycemia         HISTORY OF PRESENT ILLNESS    Melba Clement is a 66 y.o. female with history of A. fib not on anticoagulation, obesity, hypertension, hyperlipidemia, TIA, COPD on intermittent home oxygen, DM on insulin who presents with palpitations, hyperglycemia, shortness of breath on exertion, and diarrhea for the past few days. Patient states she recently got her second shingles shot and the COVID booster on Friday and initially thought her symptoms could be due to that. However, they progressively got worse which is when she started to get worried. Patient states she could feel her heart fluttering in her chest and had developed chest pressure where she felt like someone was sitting on her chest which prompted her to come to the emergency department.       Location/Symptom: Chest pressure, palpitations, SOB, hyperglycemia  Timing/Onset: Acute  Provocation: Unknown  Quality: Pressure  Radiation: Nonradiating  Severity: Moderate to severe  Timing/Duration: Few days  Modifying Factors: Unknown    REVIEW OF SYSTEMS       General ROS - No fevers, No malaise   Ophthalmic ROS - No discharge, No changes in vision  ENT ROS -  No sore throat, No rhinorrhea,   Respiratory ROS - + shortness of breath, no cough, no  wheezing  Cardiovascular ROS -  + chest pain, + dyspnea on exertion, + palpitations  Gastrointestinal ROS - No abdominal pain, no nausea or vomiting, +diarrhea, no black or bloody stools  Genito-Urinary ROS - No dysuria, trouble voiding, or hematuria  Musculoskeletal ROS - No myalgias, No arthalgias  Neurological ROS - No headache, no dizziness/lightheadedness, No focal weakness, no loss of sensation  Dermatological ROS - No lesions, No rash     (PQRS) Advance directives on face sheet per hospital policy. No change unless specifically mentioned in chart    PAST MEDICAL HISTORY    has a past medical history of Abdominal bloating, Adenomatous polyp of ascending colon, Allergic rhinitis, Anxiety, Arthritis, Asthma, Atrial fibrillation (HCC), Back pain, Benign hypertension with CKD (chronic kidney disease) stage III ([address] CAD (coronary artery disease), Caffeine use, CHF (congestive heart failure) ([address] Chronic kidney disease, CKD (chronic kidney disease) stage 3, GFR 30-59 ml/min ([facility]), COPD (chronic obstructive pulmonary disease) ([facility] Degeneration of lumbar or lumbosacral intervertebral disc, Depression, DM (diabetes mellitus) ([facility] Emphysema of lung ([facility] Gastritis, GERD (gastroesophageal reflux disease), Hearing loss, Hematuria, Hiatal hernia, HTN (hypertension), benign, Hypertriglyceridemia, Incontinence of urine, Irritable bowel syndrome with both constipation and diarrhea, Knee arthropathy, Lumbosacral spondylosis without myelopathy, Migraine headache, Mumps, Neuropathy, Obesity, On home oxygen therapy, HIGINIO on CPAP, Otitis media, Secondary diabetes mellitus with stage 3 chronic kidney disease (GFR 30-59) ([facility] Stroke ([facility] Tinnitus, Type 2 diabetes mellitus without complication ([facility] Type II or unspecified type diabetes mellitus without mention of complication, not stated as uncontrolled, UTI (lower urinary tract infection), and Varicose vein. I have reviewed the past medical history with the patient and it is pertinent to this complaint. SURGICAL HISTORY      has a past surgical history that includes Cholecystectomy (2007); Carpal tunnel release (Right); Tympanoplasty; Dilation & curettage (1979); Cystocopy (9/25/2013); Cataract removal with implant (Bilateral); Tonsillectomy; Incontinence surgery; ablation of dysrhythmic focus (2000);  Upper gastrointestinal endoscopy (03/2016); eye surgery; Tubal ligation; other surgical history (09/10/15); Insertable Cardiac Monitor (12/04/2015); Tympanoplasty; Colonoscopy; Colonoscopy (04/10/2017); pr colsc flx w/removal lesion by hot bx forceps (N/A, 4/10/2017); Tympanostomy tube placement (08/21/2017); Upper gastrointestinal endoscopy (N/A, 3/2/2021); and Colonoscopy (N/A, 3/2/2021). I have reviewed and agree with Surgical History entered and it is pertinent to this complaint.      CURRENT MEDICATIONS     melatonin tablet 5 mg, Nightly PRN  albuterol (PROVENTIL) nebulizer solution 2.5 mg, Q6H PRN  0.9 % sodium chloride infusion, Continuous  sodium chloride flush 0.9 % injection 5-40 mL, 2 times per day  sodium chloride flush 0.9 % injection 5-40 mL, PRN  0.9 % sodium chloride infusion, PRN  potassium chloride (KLOR-CON M) extended release tablet 40 mEq, PRN   Or  potassium bicarb-citric acid (EFFER-K) effervescent tablet 40 mEq, PRN   Or  potassium chloride 10 mEq/100 mL IVPB (Peripheral Line), PRN  enoxaparin (LOVENOX) injection 40 mg, Daily  ondansetron (ZOFRAN) injection 4 mg, Q4H PRN  polyethylene glycol (GLYCOLAX) packet 17 g, Daily PRN  acetaminophen (TYLENOL) tablet 650 mg, Q6H PRN  albuterol (PROVENTIL) nebulizer solution 2.5 mg, Q6H PRN  atorvastatin (LIPITOR) tablet 40 mg, Daily  budesonide-formoterol (SYMBICORT) 160-4.5 MCG/ACT inhaler 2 puff, BID  clopidogrel (PLAVIX) tablet 75 mg, Daily  escitalopram (LEXAPRO) tablet 10 mg, Daily  fenofibrate (TRIGLIDE) tablet 160 mg, Daily  ferrous sulfate (FE TABS 325) EC tablet 325 mg, Daily with breakfast  insulin glargine (LANTUS) injection vial 50 Units, BID  insulin lispro (HUMALOG) injection vial 0-16 Units, TID WC  insulin lispro (HUMALOG) injection vial 0-4 Units, Nightly  isosorbide dinitrate (ISORDIL) tablet 30 mg, Daily  oxyCODONE-acetaminophen (PERCOCET) 5-325 MG per tablet 1 tablet, Q8H PRN  pantoprazole (PROTONIX) tablet 40 mg, QAM AC  topiramate (TOPAMAX) tablet 100 mg, Nightly  glucose chewable tablet 16 g, PRN  dextrose bolus 10% 125 mL, PRN   Or  dextrose bolus 10% 250 mL, PRN  glucagon (rDNA) injection 1 mg, PRN  dextrose 10 % infusion, Continuous PRN        All medication charted and reviewed. ALLERGIES     is allergic to robitussin [guaifenesin], codeine, compazine [prochlorperazine maleate], iodides, morphine, moxifloxacin, reglan [metoclopramide], avelox [moxifloxacin hcl in nacl], cipro xr, sulfa antibiotics, and zofran [ondansetron hcl]. FAMILY HISTORY     She indicated that her mother is . She indicated that her father is . She indicated that her sister is . She indicated that the status of her maternal grandmother is unknown.     family history includes Diabetes in her maternal grandmother and mother; Emphysema in her sister; Heart Disease in her mother; Stomach Cancer in her father. The patient denies any pertinent family history. I have reviewed and agree with the family history entered. I have reviewed the Family History and it is not significant to the case    SOCIAL HISTORY      reports that she quit smoking about 22 years ago. Her smoking use included cigarettes. She has a 123.00 pack-year smoking history. She has never used smokeless tobacco. She reports that she does not drink alcohol and does not use drugs. I have reviewed and agree with all Social.  There are no concerns for substance abuse/use. PHYSICAL EXAM     INITIAL VITALS:  oral temperature is 98 °F (36.7 °C). Her blood pressure is 111/55 (abnormal) and her pulse is 77. Her respiration is 18 and oxygen saturation is 96%.       CONSTITUTIONAL: AOx4, no apparent distress, appears stated age    HEAD: normocephalic, atraumatic   EYES: PERRLA, EOMI    ENT: moist mucous membranes, uvula midline   NECK: supple, symmetric, NC in place   BACK: symmetric   LUNGS: clear to auscultation bilaterally   CARDIOVASCULAR: regular rate and rhythm, no murmurs, rubs or gallops   ABDOMEN: soft, non-tender, non-distended with normal active bowel sounds   NEUROLOGIC:  MAEx4, no focal sensory or motor deficits   MUSCULOSKELETAL: no clubbing, cyanosis or edema   SKIN: no rash or wounds       DIFFERENTIAL DIAGNOSIS/MDM:     Chest Pain:  DDX: Emergent: ACS/NSTEMI/STEMI/angina, arrhythmia, trauma, aortic dissection,  PE, PNA, pneumothroax, esophageal rupture, tamponade, Cocaine use  Nonemergent: pneumonia, pericarditis, GERD, MSK, Endocarditis, anxiety  Evaluated for: diaphoresis, present chest pain, tachypnea, BP both arms, heart sounds, JVD, tender chest wall, wheezing    DIAGNOSTIC RESULTS     EKG Interpretation     Interpreted by emergency department physician     Rhythm: normal sinus   Rate: normal  Axis: left  Ectopy: none  Conduction: normal but low voltage  ST Segments: normal  T Waves: Diffuse flattening  Q Waves: PRWP     EKG  Impression: non-specific EKG    RADIOLOGY:   I directly visualized the following  images and reviewed the radiologist interpretations:    XR CHEST PORTABLE    Result Date: 11/1/2022  EXAMINATION: ONE XRAY VIEW OF THE CHEST 11/1/2022 2:16 pm COMPARISON: 08/26/2022 HISTORY: Acute chest pain. FINDINGS: Normal cardiomediastinal silhouette. Cardiac loop recorder. Bibasilar atelectasis. No consolidation, pleural effusion, or pneumothorax. Bibasilar atelectasis. LABS:  I have reviewed and interpreted all available lab results.   Labs Reviewed   CBC WITH AUTO DIFFERENTIAL - Abnormal; Notable for the following components:       Result Value    Seg Neutrophils 66 (*)     Lymphocytes 20 (*)     Immature Granulocytes 1 (*)     All other components within normal limits   IMMATURE PLATELET FRACTION - Abnormal; Notable for the following components:    Platelet, Immature Fraction 16.6 (*)     All other components within normal limits   COMPREHENSIVE METABOLIC PANEL - Abnormal; Notable for the following components:    Glucose 344 (*)     Creatinine 1.09 (*)     Est, Glom Filt Rate 54 (*)     Calcium 8.4 (*)     AST 32 (*)     Total Bilirubin 0.2 (*)     Total Protein 6.1 (*)     Albumin 3.3 (*)     All other components within normal limits   LIPASE - Abnormal; Notable for the following components:    Lipase 69 (*)     All other components within normal limits   BASIC METABOLIC PANEL W/ REFLEX TO MG FOR LOW K - Abnormal; Notable for the following components:    Glucose 275 (*)     Creatinine 1.06 (*)     Est, Glom Filt Rate 56 (*)     All other components within normal limits   CBC WITH AUTO DIFFERENTIAL - Abnormal; Notable for the following components:    Eosinophils % 5 (*)     Immature Granulocytes 1 (*)     All other components within normal limits   POC GLUCOSE FINGERSTICK - Abnormal; Notable for the following components:    POC Glucose 427 (*)     All other components within normal limits   POC GLUCOSE FINGERSTICK - Abnormal; Notable for the following components:    POC Glucose 297 (*)     All other components within normal limits   POC GLUCOSE FINGERSTICK - Abnormal; Notable for the following components:    POC Glucose 394 (*)     All other components within normal limits   POC GLUCOSE FINGERSTICK - Abnormal; Notable for the following components:    POC Glucose 263 (*)     All other components within normal limits   POC GLUCOSE FINGERSTICK - Abnormal; Notable for the following components:    POC Glucose 405 (*)     All other components within normal limits   COVID-19, RAPID   RAPID INFLUENZA A/B ANTIGENS   D-DIMER, QUANTITATIVE   SPECIMEN REJECTION   BRAIN NATRIURETIC PEPTIDE   MAGNESIUM   TROPONIN   TSH WITH REFLEX   TROPONIN   MAGNESIUM   TROPONIN   PREVIOUS SPECIMEN   POCT GLUCOSE   POCT GLUCOSE   POCT GLUCOSE   POCT GLUCOSE   POCT GLUCOSE       SCREENING TOOLS:    HEART Risk Score for Chest Pain Patients  History and Physical Exam Suspicion Level  (Nausea, Vomiting, Diaphoresis, Radiation, Exertion)  Slightly Suspicious (0 pts)  Moderately Suspicious (1 pt)  Highly Suspicious (2 pts)  EKG Interpretation  Normal (0 pts)  Non-Specific Repolarization Disturbance (1 pt)  Significant ST-Depression (2 pts)  Age of Patient (in years)  = 39 (0 pts)  46-64 (1 pt)  = 65 (2 pts)  Risk Factors  No Risk Factors (0 pts)  1-2 Risk Factors (1 pt)  = 3 Risk Factors (2 pts)  Risk Factors Include:  Hypercholesterolemia  Hypertension  Diabetes Mellitus  Cigarette smoking  Positive family history  Obesity  CAD  (SLE, CKDz, HIV, Cocaine abuse)  Troponin Levels  = Normal Limit (0 pts)  1-3 Times Normal Limit (1 pt)  > 3 Times Normal Limit (2 pts)  TOTAL: 6    Percent Risk for Major Adverse Cardiac Event (MACE)  0-3 pts indicates low risk for MACE   2.5% (DISCHARGE)   4-7 pts indicates moderate risk for MACE  20.3% (OBS)  8-10 pts indicates high risk for MACE  72.7% (EARLY INVASIVE [place])    CDU IMPRESSION / Hilda Hua is a 66 y.o. female who presents with palpitations, chest pressure, and shortness of breath with exertion    Palpitations/SOB with exertion/chest pressure  Cardiology evaluating, no need for ischemic work-up at this time  Follow-up outpatient with cardiologist  Continue home medications and pain control  Monitor vitals, labs, and imaging  DISPO: pending consults and clinical improvement    CONSULTS:    IP CONSULT TO CARDIOLOGY    PROCEDURES:  Not indicated      PATIENT REFERRED TO:    No follow-up provider specified. --  Darryle Cooper, MD   Emergency Medicine Resident     This dictation was generated by voice recognition computer software. Although all attempts are made to edit the dictation for accuracy, there may be errors in the transcription that are not intended.

## 2022-11-02 NOTE — CONSULTS
Attestation signed by      Attending Physician Statement:    I have discussed the care of  Girma Tobias , including pertinent history and exam findings, with the Cardiology fellow/resident. I have seen and examined the patient and the key elements of all parts of the encounter have been performed by me. I agree with the assessment, plan and orders as documented by the fellow/resident, after I modified exam findings and plan of treatments, and the final version is my approved version of the assessment. Additional Comments: [facility]    Consult / H&P               Today's Date: 11/2/2022  Patient Name: Girma Tobias  Date of admission: 11/1/2022  1:39 PM  Patient's age: 66 y. o., 1950  Admission Dx: Palpitations [R00.2]  Chest pain [R07.9]  Hyperglycemia [R73.9]    Reason for Consult:  Cardiac evaluation    Requesting Physician: Kirsten Daniel MD    CHIEF COMPLAINT: Palpitations, shortness of breath    History Obtained From:  patient, electronic medical record    HISTORY OF PRESENT ILLNESS:      The patient is a 66 y.o.  female with known history of history of ASCAD,   60% small D1 disease on cardiac catheterization 2012,COPD, chronic hypoxic respiratory failure, essential hypertension, hyperlipidemia , TIA, diabetes mellitus, atrial fibrillation, history of PVCs and PACs, history of ablation in the past, morbid obesity   who is admitted to the hospital for palpitations and shortness of breath. Patient states that he does have home oxygen but does not wear it all all the time, and has been having worsening shortness of breath more with exertion. Complaining of palpitations, states that she has funny sensation in her heart which is causing discomfort, it is more local tenderness, not coming from the heart per patient. States that she had multiple episodes of diarrhea at home which is when she felt dizzy and lightheaded but it has gone now.   Patient was hypotensive while in ED and her blood pressure improved with IV fluids. EKG-left axis deviation, nonspecific T wave changes, no change from before    Troponins 12>12>10  Stress test in March this year-negative  Echo in October 2021-unremarkable, EF greater than 55%  Echo 02/02/2020 - showed EF 66%, G1DD  Cath 2012 - 60% small 1st diagonal branch disease of LAD on cardiac cath, EF 70%   Prior ablation of A. Fib at [facility] in 2001    Past Medical History:   has a past medical history of Abdominal bloating, Adenomatous polyp of ascending colon, Allergic rhinitis, Anxiety, Arthritis, Asthma, Atrial fibrillation ([address] Back pain, Benign hypertension with CKD (chronic kidney disease) stage III ([address] CAD (coronary artery disease), Caffeine use, CHF (congestive heart failure) ([address] Chronic kidney disease, CKD (chronic kidney disease) stage 3, GFR 30-59 ml/min ([facility]), COPD (chronic obstructive pulmonary disease) ([address] Degeneration of lumbar or lumbosacral intervertebral disc, Depression, DM (diabetes mellitus) ([address] Emphysema of lung ([address] Gastritis, GERD (gastroesophageal reflux disease), Hearing loss, Hematuria, Hiatal hernia, HTN (hypertension), benign, Hypertriglyceridemia, Incontinence of urine, Irritable bowel syndrome with both constipation and diarrhea, Knee arthropathy, Lumbosacral spondylosis without myelopathy, Migraine headache, Mumps, Neuropathy, Obesity, On home oxygen therapy, HIGINIO on CPAP, Otitis media, Secondary diabetes mellitus with stage 3 chronic kidney disease (GFR 30-59) ([address] Stroke ([address] Tinnitus, Type 2 diabetes mellitus without complication ([address] Type II or unspecified type diabetes mellitus without mention of complication, not stated as uncontrolled, UTI (lower urinary tract infection), and Varicose vein. Past Surgical History:   has a past surgical history that includes Cholecystectomy (2007); Carpal tunnel release (Right); Tympanoplasty; Dilation & curettage (1979);  Cystocopy (9/25/2013); Cataract removal with implant (Bilateral); Tonsillectomy; Incontinence surgery; ablation of dysrhythmic focus (2000); Upper gastrointestinal endoscopy (03/2016); eye surgery; Tubal ligation; other surgical history (09/10/15); Insertable Cardiac Monitor (12/04/2015); Tympanoplasty; Colonoscopy; Colonoscopy (04/10/2017); pr colsc flx w/removal lesion by hot bx forceps (N/A, 4/10/2017); Tympanostomy tube placement (08/21/2017); Upper gastrointestinal endoscopy (N/A, 3/2/2021); and Colonoscopy (N/A, 3/2/2021). Home Medications:    Prior to Admission medications    Medication Sig Start Date End Date Taking? Authorizing Provider   TRULICITY 1.5 JX/8.5PH SC injection INJECT ONE AND A HALF (1 & 1/2) MG INTO THE SKIN ONCE A WEEK STOP 0.75 10/31/22   VERONIKA Meredith CNP   metFORMIN (GLUCOPHAGE) 1000 MG tablet Take 1 tablet by mouth 2 times daily (with meals) 10/21/22 11/20/22  VERONIKA Rivera CNP   escitalopram (LEXAPRO) 10 MG tablet Take 1 tablet by mouth daily 10/13/22 1/11/23  VERONIKA Meredith CNP   furosemide (LASIX) 20 MG tablet TAKE 1 TABLET BY MOUTH ONCE DAILY AS NEEDED 10/13/22   VERONIKA Meredith CNP   oxyCODONE-acetaminophen (PERCOCET) 5-325 MG per tablet Take 1 tablet by mouth every 8 hours as needed for Pain for up to 30 days. Intended supply: 30 days. 10/15/22 11/14/22  VERONIKA Abbasi CNP   oxybutynin (DITROPAN-XL) 5 MG extended release tablet TAKE 1 TABLET BY MOUTH DAILY 9/26/22   VERONIKA Meredith CNP   budesonide-formoterol (SYMBICORT) 160-4.5 MCG/ACT AERO Inhale 2 puffs into the lungs in the morning and 2 puffs before bedtime.  As needed for sob. 7/27/22   Gina Blandon MD   blood glucose test strips (TRUE METRIX BLOOD GLUCOSE TEST) strip THREE TIMES A DAY 7/27/22   Gina Blandon MD   blood glucose test strips (ONETOUCH ULTRA) strip TEST TWO (2) TO THREE (3) TIMES A DAY & AS NEEDED FOR SYMPTOMS OF IRREGULAR BLOOD GLUCOSE 7/27/22   Lucho Head Bob Chacko MD   blood glucose test strips (ONETOUCH ULTRA) strip As needed.  7/27/22   Jaime Livingston MD   vitamin B-12 (CYANOCOBALAMIN) 100 MCG tablet take half a tablet BY MOUTH ONCE DAILY 7/27/22   Jaime Livingston MD   Melatonin 10 MG TABS Take 10 mg by mouth nightly 7/27/22   Jaime Livingston MD   dicyclomine (BENTYL) 10 MG capsule TAKE 1 CAPSULE BY MOUTH TWICE A DAY AS NEEDED FOR ABDOMINAL SPASMS 7/27/22   Jaime Livingston MD    MG capsule TAKE 1 CAPSULE BY MOUTH TWICE A DAY 7/18/22   VERONIKA Alexander CNP   SM ASPIRIN ADULT LOW STRENGTH 81 MG EC tablet TAKE 1 TABLET BY MOUTH ONCE DAILY 7/18/22   VERONIKA Alexander CNP   pantoprazole (PROTONIX) 40 MG tablet TAKE 1 TABLET BY MOUTH TWICE A DAY 7/18/22   VERONIKA Alexander CNP   isosorbide dinitrate (ISORDIL) 30 MG tablet TAKE 1 TABLET BY MOUTH ONCE DAILY 7/18/22   VERONIKA Alexander CNP   FEROSUL 325 (65 Fe) MG tablet TAKE 1 TAB BY MOUTH IN THE MORNING 6/20/22   VERONIKA Snowden CNP   clopidogrel (PLAVIX) 75 MG tablet TAKE 1 TAB BY MOUTH ONCE A DAY ( IN THE MORNING ) -THIS MEDICINE MAY BE TAKENWITH OR WITHOUT FOOD 5/25/22   VEROINKA Snowden CNP   fenofibrate (TRIGLIDE) 160 MG tablet TAKE 1 TABLET BY MOUTH DAILY 3/30/22   Miriam Dominguez MD   insulin aspart (NOVOLOG FLEXPEN) 100 UNIT/ML injection pen IF<139 NO INSULIN; 140-199-2 UN;200-249-4 UN;250-299-6 GM;513-634-8 UN;350-400=10 UN;ABOVE 400-12 UNIT(S) 3/30/22   Miriam Dominguez MD   magnesium oxide (MAG-OX) 400 MG tablet  2/21/22   Historical Provider, MD   insulin glargine (BASAGLAR KWIKPEN) 100 UNIT/ML injection pen Inject 55 Units into the skin 2 times daily 3/1/22   Miriam Dominguez MD   losartan (COZAAR) 25 MG tablet TAKE 1 TABLET BY MOUTH ONCE DAILY 2/21/22   Miriam Dominguez MD   carvedilol (COREG) 3.125 MG tablet TAKE 1 TAB BY MOUTH TWICE A DAY ( IN THE MORNING AND BEDTIME ) 1/21/22   Miriam Dominguez MD   Multiple Vitamins-Minerals (CERTAVITE/ANTIOXIDANTS) TABS TAKE 1 TABLET BY MOUTH ONCE DAILY 12/8/21   Maxine Serra MD   topiramate (TOPAMAX) 100 MG tablet TAKE 1 TABLET BY MOUTH NIGHTLY  10/25/21   Latia Mcdaniels MD   atorvastatin (LIPITOR) 40 MG tablet Take 1 tablet by mouth daily 2/28/21   Maxine Serra MD   albuterol (PROVENTIL) (2.5 MG/3ML) 0.083% nebulizer solution Take 3 mLs by nebulization every 6 hours as needed for Wheezing 11/3/20   Shaye Jules MD   OXYGEN Inhale 3 L into the lungs     Historical MD Roxanna      Current Facility-Administered Medications: melatonin tablet 5 mg, 5 mg, Oral, Nightly PRN  0.9 % sodium chloride infusion, , IntraVENous, Continuous  sodium chloride flush 0.9 % injection 5-40 mL, 5-40 mL, IntraVENous, 2 times per day  sodium chloride flush 0.9 % injection 5-40 mL, 5-40 mL, IntraVENous, PRN  0.9 % sodium chloride infusion, 25 mL, IntraVENous, PRN  potassium chloride (KLOR-CON M) extended release tablet 40 mEq, 40 mEq, Oral, PRN **OR** potassium bicarb-citric acid (EFFER-K) effervescent tablet 40 mEq, 40 mEq, Oral, PRN **OR** potassium chloride 10 mEq/100 mL IVPB (Peripheral Line), 10 mEq, IntraVENous, PRN  enoxaparin (LOVENOX) injection 40 mg, 40 mg, SubCUTAneous, Daily  ondansetron (ZOFRAN) injection 4 mg, 4 mg, IntraVENous, Q4H PRN  polyethylene glycol (GLYCOLAX) packet 17 g, 17 g, Oral, Daily PRN  acetaminophen (TYLENOL) tablet 650 mg, 650 mg, Oral, Q6H PRN  albuterol (PROVENTIL) nebulizer solution 2.5 mg, 2.5 mg, Nebulization, Q6H PRN  atorvastatin (LIPITOR) tablet 40 mg, 40 mg, Oral, Daily  budesonide-formoterol (SYMBICORT) 160-4.5 MCG/ACT inhaler 2 puff, 2 puff, Inhalation, BID  clopidogrel (PLAVIX) tablet 75 mg, 75 mg, Oral, Daily  escitalopram (LEXAPRO) tablet 10 mg, 10 mg, Oral, Daily  fenofibrate (TRIGLIDE) tablet 160 mg, 160 mg, Oral, Daily  ferrous sulfate (FE TABS 325) EC tablet 325 mg, 325 mg, Oral, Daily with breakfast  insulin glargine (LANTUS) injection vial 50 Units, 50 Units, SubCUTAneous, BID  insulin lispro (HUMALOG) injection vial 0-16 Units, 0-16 Units, SubCUTAneous, TID WC  insulin lispro (HUMALOG) injection vial 0-4 Units, 0-4 Units, SubCUTAneous, Nightly  isosorbide dinitrate (ISORDIL) tablet 30 mg, 30 mg, Oral, Daily  oxyCODONE-acetaminophen (PERCOCET) 5-325 MG per tablet 1 tablet, 1 tablet, Oral, Q8H PRN  pantoprazole (PROTONIX) tablet 40 mg, 40 mg, Oral, QAM AC  topiramate (TOPAMAX) tablet 100 mg, 100 mg, Oral, Nightly  glucose chewable tablet 16 g, 4 tablet, Oral, PRN  dextrose bolus 10% 125 mL, 125 mL, IntraVENous, PRN **OR** dextrose bolus 10% 250 mL, 250 mL, IntraVENous, PRN  glucagon (rDNA) injection 1 mg, 1 mg, SubCUTAneous, PRN  dextrose 10 % infusion, , IntraVENous, Continuous PRN    Allergies:  Robitussin [guaifenesin], Codeine, Compazine [prochlorperazine maleate], Iodides, Morphine, Moxifloxacin, Reglan [metoclopramide], Avelox [moxifloxacin hcl in nacl], Cipro xr, Sulfa antibiotics, and Zofran [ondansetron hcl]    Social History:   reports that she quit smoking about 22 years ago. Her smoking use included cigarettes. She has a 123.00 pack-year smoking history. She has never used smokeless tobacco. She reports that she does not drink alcohol and does not use drugs. Family History: family history includes Diabetes in her maternal grandmother and mother; Emphysema in her sister; Heart Disease in her mother; Stomach Cancer in her father. No h/o sudden cardiac death. No for premature CAD    REVIEW OF SYSTEMS:    Constitutional: there has been no unanticipated weight loss. There's been No change in energy level, No change in activity level. Eyes: No visual changes or diplopia. No scleral icterus. ENT: No Headaches  Cardiovascular: Cardiac history as above  Respiratory: No previous pulmonary problems, No cough  Gastrointestinal: No abdominal pain. No change in bowel or bladder habits. Genitourinary: No dysuria, trouble voiding, or hematuria.   Musculoskeletal:  No gait disturbance, No weakness or joint complaints. Integumentary: No rash or pruritis. Neurological: No headache, diplopia, change in muscle strength, numbness or tingling. No change in gait, balance, coordination, mood, affect, memory, mentation, behavior. Psychiatric: No anxiety, or depression. Endocrine: No temperature intolerance. No excessive thirst, fluid intake, or urination. No tremor. Hematologic/Lymphatic: No abnormal bruising or bleeding, blood clots or swollen lymph nodes. Allergic/Immunologic: No nasal congestion or hives. PHYSICAL EXAM:      BP (!) 111/55   Pulse 77   Temp 98 °F (36.7 °C) (Oral)   Resp 18   LMP  (LMP Unknown)   SpO2 96%    Constitutional and General Appearance: alert, cooperative, no distress and appears stated age  HEENT: PERRL, no cervical lymphadenopathy. No masses palpable. Normal oral mucosa  Respiratory:  Normal excursion and expansion without use of accessory muscles  Resp Auscultation: Good respiratory effort. No for increased work of breathing. On auscultation: clear to auscultation bilaterally  Cardiovascular: The apical impulse is not displaced  Heart tones are crisp and normal. regular S1 and S2.  Jugular venous pulsation Normal  The carotid upstroke is normal in amplitude and contour without delay or bruit  Peripheral pulses are symmetrical and full   Abdomen:   No masses or tenderness  Bowel sounds present  Extremities:   No Cyanosis or Clubbing   Lower extremity edema: No   Skin: Warm and dry  Neurological:  Alert and oriented. Moves all extremities well  No abnormalities of mood, affect, memory, mentation, or behavior are noted      Labs:     CBC:   Recent Labs     11/01/22  1416 11/02/22  0718   WBC 8.0 6.5   HGB 14.8 13.5   HCT 44.5 41.1   PLT See Reflexed IPF Result 165     BMP:   Recent Labs     11/01/22  1542      K 4.0   CO2 27   BUN 19   CREATININE 1.09*   LABGLOM 54*   GLUCOSE 344*     BNP: No results for input(s): BNP in the last 72 hours.   PT/INR: No results for input(s): PROTIME, INR in the last 72 hours. APTT:No results for input(s): APTT in the last 72 hours. CARDIAC ENZYMES:No results for input(s): CKTOTAL, CKMB, CKMBINDEX, TROPONINI in the last 72 hours.   FASTING LIPID PANEL:  Lab Results   Component Value Date/Time    HDL 43 03/09/2022 10:14 AM    TRIG 202 03/09/2022 10:14 AM     LIVER PROFILE:  Recent Labs     11/01/22  1542   AST 32*   ALT 27   LABALBU 3.3*       IMPRESSION:    Patient Active Problem List   Diagnosis    Chronic pain of left knee    Type 2 diabetes mellitus with diabetic polyneuropathy, with long-term current use of insulin (HCC)    COPD (chronic obstructive pulmonary disease)    Nonintractable migraine, unspecified migraine type    Coronary artery disease due to lipid rich plaque    DDD (degenerative disc disease), lumbar    Chronic, continuous use of opioids    DDD (degenerative disc disease), cervical    Osteoarthritis of cervical spine    GERD (gastroesophageal reflux disease)    Essential hypertension    Mixed hyperlipidemia    Insomnia    Lumbosacral spondylosis without myelopathy    Sacroiliitis, not elsewhere classified ([facility]    Carpal tunnel syndrome    Mixed stress and urge urinary incontinence    Intractable migraine with aura without status migrainosus    Anxiety and depression    Chronic migraine without aura without status migrainosus, not intractable    Morbid (severe) obesity with alveolar hypoventilation (HCC)    Chronic nausea    Lung nodule    Neuropathy    Obstructive sleep apnea syndrome    Asthma with COPD ([address]    Obesity, Class III, BMI 40-49.9 (morbid obesity) ([facility]    Stage 3 chronic kidney disease (HCC)    Benign hypertension with CKD (chronic kidney disease) stage III (HCC)    Secondary diabetes mellitus with stage 3 chronic kidney disease (GFR 30-59) (HCC)    CKD (chronic kidney disease) stage 3, GFR 30-59 ml/min    Lumbar radiculopathy, chronic    Sessile colonic polyp    Morbid obesity (HCC)    Adenomatous polyp of as needed as needed basis oxygen  History of sleep apnea using CPAP  Normal tropes nonspecific T changes most likely secondary to COPD  History of heart cath with single-vessel small artery disease normal ejection fraction next no further cardiac work-up  Bella Wilkinson MD

## 2022-11-02 NOTE — CARE COORDINATION
Case Management Assessment  Initial Evaluation    Date/Time of Evaluation: 11/2/2022 12:55 PM  Assessment Completed by: Jimena Pedro RN    If patient is discharged prior to next notation, then this note serves as note for discharge by case management. Patient Name: Alejandro Koehler                   YOB: 1950  Diagnosis: Palpitations [R00.2]  Chest pain [R07.9]  Hyperglycemia [R73.9]                   Date / Time: 11/1/2022  1:39 PM    Patient Admission Status: Observation   Readmission Risk (Low < 19, Mod (19-27), High > 27): Readmission Risk Score: 16.7    Current PCP: VERONIKA Menezes CNP  PCP verified by CM? (P) Yes    Chart Reviewed: Yes      History Provided by: (P) Patient  Patient Orientation: (P) Alert and Oriented    Patient Cognition: (P) Alert    Hospitalization in the last 30 days (Readmission):  No    If yes, Readmission Assessment in CM Navigator will be completed. Advance Directives:      Code Status: Full Code   Patient's Primary Decision Maker is:      Primary Decision Maker: Aden Kirkland *HIPAA - Child - [phone]    Discharge Planning:    Patient lives with: (P) Alone Type of Home: (P) Apartment  Primary Care Giver: (P) Self  Patient Support Systems include: (P) Children, Family Members, OLYA/Passport   Current Financial resources:    Current community resources:    Current services prior to admission: (P) C-pap, Durable Medical Equipment, OLYA/Passport            Current DME:              Type of Home Care services:  [employer]    ADLS  Prior functional level: (P) Assistance with the following:, Cooking, Housework, Shopping  Current functional level: (P) Assistance with the following:, Cooking, Housework, Shopping    PT AM-PAC: 19 /24  OT AM-PAC:   /24    Family can provide assistance at DC: (P) Yes  Would you like Case Management to discuss the discharge plan with any other family members/significant others, and if so, who?     Plans to Return to Present Housing: (P) Yes  Other Identified Issues/Barriers to RETURNING to current housing:   Potential Assistance needed at discharge: (P) N/A            Potential DME:    Patient expects to discharge to: [address] [place] [address] for transportation at discharge:      Financial    Payor: Mendoza Roy / Plan: Wesley Brewer / Product Type: *No Product type* /     Does insurance require precert for SNF: Yes    Potential assistance Purchasing Medications: (P) No  Meds-to-Beds request: Yes      [address], [address] [facility] [address] [address] [phone]  [address] [place] [address]  Phone: [phone] Fax: [phone]      Notes:    Factors facilitating achievement of predicted outcomes: Motivated, Cooperative, Pleasant, and Sense of humor    Barriers to discharge: Additional Case Management Notes: Patient has aid services through [facility] and come Monday through Friday for 3 hours per day. Patient denies the need for additional services. The Plan for Transition of Care is related to the following treatment goals of Palpitations [R00.2]  Chest pain [R07.9]  Hyperglycemia [M85.5]    IF APPLICABLE: The Patient and/or patient representative James and her family were provided with a choice of provider and agrees with the discharge plan. Freedom of choice list with basic dialogue that supports the patient's individualized plan of care/goals and shares the quality data associated with the providers was provided to:     Patient Representative Name:       The Patient and/or Patient Representative Agree with the Discharge Plan? Nick Worrell RN  Case Management Department  Ph: [phone]     [id number] Patient needs a ride home and is arranged through [facility]. Trip number is [phone].

## 2022-11-02 NOTE — PLAN OF CARE
Problem: Chronic Conditions and Co-morbidities  Goal: Patient's chronic conditions and co-morbidity symptoms are monitored and maintained or improved  Outcome: Completed     Problem: Safety - Adult  Goal: Free from fall injury  Outcome: Completed     Problem: Pain  Goal: Verbalizes/displays adequate comfort level or baseline comfort level  Outcome: Completed

## 2022-11-07 ENCOUNTER — TELEPHONE (OUTPATIENT)
Dept: PRIMARY CARE CLINIC | Age: 72
End: 2022-11-07

## 2022-11-07 NOTE — TELEPHONE ENCOUNTER
Patient is having c/o muscle spasms on RT buttocks pain that is radiating down right leg. Patient has tried dicyclomine 10 mg  with no relief , OTC medications , hot / cold therapies, would like to know if medication can be sent to pharmacy to help with this issue; Health Maintenance   Topic Date Due    DTaP/Tdap/Td vaccine (1 - Tdap) Never done    Diabetic retinal exam  10/29/2021    Annual Wellness Visit (AWV)  Never done    Breast cancer screen  11/17/2022    A1C test (Diabetic or Prediabetic)  01/13/2023    Lipids  03/09/2023    Diabetic foot exam  05/06/2023    Depression Monitoring  05/10/2023    Colorectal Cancer Screen  03/02/2024    DEXA (modify frequency per FRAX score)  Completed    Flu vaccine  Completed    Pneumococcal 65+ years Vaccine  Completed    COVID-19 Vaccine  Completed    Hepatitis C screen  Completed    Hepatitis A vaccine  Aged Out    Hib vaccine  Aged Out    Meningococcal (ACWY) vaccine  Aged Out             (applicable per patient's age: Cancer Screenings, Depression Screening, Fall Risk Screening, Immunizations)    Hemoglobin A1C (%)   Date Value   10/13/2022 13.9   07/08/2022 11.8   03/01/2022 12.5     Microalb/Crt.  Ratio (mcg/mg creat)   Date Value   03/09/2022 9     LDL Cholesterol (mg/dL)   Date Value   03/09/2022 128     AST (U/L)   Date Value   11/01/2022 32 (H)     ALT (U/L)   Date Value   11/01/2022 27     BUN (mg/dL)   Date Value   11/02/2022 20      (goal A1C is < 7)   (goal LDL is <100) need 30-50% reduction from baseline     BP Readings from Last 3 Encounters:   11/02/22 (!) 111/55   10/13/22 120/74   10/06/22 (!) 82/49    (goal /80)      All Future Testing planned in [facility]:  Lab Frequency Next Occurrence   NM LUNG SCAN PERFUSION ONLY Once 08/26/2022   Phase II - up with assistance PRN    Phase I - warming device PRN    Phase I - cardiac monitor PRN    Phase I & II - metered glucose PRN        Next Visit Date:  Future Appointments   Date Time Provider Department Center   11/10/2022 10:00 AM VERONIKA Rao CNP [facility] [address]   1/19/2023  9:45 AM VERONIKA Recinos CNP [facility]            Patient Active Problem List:     Chronic pain of left knee     Type 2 diabetes mellitus with diabetic polyneuropathy, with long-term current use of insulin (HCC)     COPD (chronic obstructive pulmonary disease)     Nonintractable migraine, unspecified migraine type     Coronary artery disease due to lipid rich plaque     DDD (degenerative disc disease), lumbar     Chronic, continuous use of opioids     DDD (degenerative disc disease), cervical     Osteoarthritis of cervical spine     GERD (gastroesophageal reflux disease)     Essential hypertension     Mixed hyperlipidemia     Insomnia     Lumbosacral spondylosis without myelopathy     Sacroiliitis, not elsewhere classified ([facility] [address]     Carpal tunnel syndrome     Mixed stress and urge urinary incontinence     Intractable migraine with aura without status migrainosus     Anxiety and depression     Chronic migraine without aura without status migrainosus, not intractable     Morbid (severe) obesity with alveolar hypoventilation (HCC)     Chronic nausea     Lung nodule     Neuropathy     Obstructive sleep apnea syndrome     Asthma with COPD ([address]     Obesity, Class III, BMI 40-49.9 (morbid obesity) ([address]     Stage 3 chronic kidney disease (HCC)     Benign hypertension with CKD (chronic kidney disease) stage III (HCC)     Secondary diabetes mellitus with stage 3 chronic kidney disease (GFR 30-59) (HCC)     CKD (chronic kidney disease) stage 3, GFR 30-59 ml/min     Lumbar radiculopathy, chronic     Sessile colonic polyp     Morbid obesity (HCC)     Adenomatous polyp of ascending colon     Irritable bowel syndrome with both constipation and diarrhea     Abdominal bloating     Major depressive disorder, single episode, unspecified     Permanent atrial fibrillation (HCC)     Polyneuropathy, unspecified Atypical chest pain     Left ventricular diastolic dysfunction     Transaminasemia     Acute respiratory failure with hypoxia and hypercarbia (HCC)     Macrocytosis     Morbid obesity with BMI of 40.0-44.9, adult (HCC)     Chronic diastolic congestive heart failure (HCC)     Oxygen dependent     Chronic bilateral low back pain with bilateral sciatica     Left lower lobe pneumonia     Prolonged Q-T interval on ECG     Chronic respiratory failure with hypoxia (HCC)     Pneumonia     Chest pain

## 2022-11-08 ENCOUNTER — HOSPITAL ENCOUNTER (EMERGENCY)
Age: 72
Discharge: HOME | End: 2022-11-08
Attending: EMERGENCY MEDICINE
Payer: COMMERCIAL

## 2022-11-08 VITALS
HEART RATE: 62 BPM | WEIGHT: 220 LBS | RESPIRATION RATE: 16 BRPM | BODY MASS INDEX: 40.24 KG/M2 | TEMPERATURE: 97.7 F | OXYGEN SATURATION: 96 % | DIASTOLIC BLOOD PRESSURE: 83 MMHG | SYSTOLIC BLOOD PRESSURE: 143 MMHG

## 2022-11-08 DIAGNOSIS — M79.604 RIGHT LEG PAIN: Primary | ICD-10-CM

## 2022-11-08 DIAGNOSIS — R73.9 HYPERGLYCEMIA: ICD-10-CM

## 2022-11-08 LAB
CHP ED QC CHECK: NORMAL
CHP ED QC CHECK: YES
GLUCOSE BLD-MCNC: 321 MG/DL
GLUCOSE BLD-MCNC: 321 MG/DL (ref 65–105)
GLUCOSE BLD-MCNC: 453 MG/DL
GLUCOSE BLD-MCNC: 453 MG/DL (ref 65–105)

## 2022-11-08 PROCEDURE — 6360000002 HC RX W HCPCS: Performed by: STUDENT IN AN ORGANIZED HEALTH CARE EDUCATION/TRAINING PROGRAM

## 2022-11-08 PROCEDURE — 6370000000 HC RX 637 (ALT 250 FOR IP): Performed by: STUDENT IN AN ORGANIZED HEALTH CARE EDUCATION/TRAINING PROGRAM

## 2022-11-08 PROCEDURE — 82947 ASSAY GLUCOSE BLOOD QUANT: CPT

## 2022-11-08 PROCEDURE — 96372 THER/PROPH/DIAG INJ SC/IM: CPT

## 2022-11-08 PROCEDURE — 99284 EMERGENCY DEPT VISIT MOD MDM: CPT

## 2022-11-08 PROCEDURE — 93970 EXTREMITY STUDY: CPT

## 2022-11-08 RX ORDER — KETOROLAC TROMETHAMINE 15 MG/ML
15 INJECTION, SOLUTION INTRAMUSCULAR; INTRAVENOUS ONCE
Status: COMPLETED | OUTPATIENT
Start: 2022-11-08 | End: 2022-11-08

## 2022-11-08 RX ORDER — ORPHENADRINE CITRATE 30 MG/ML
60 INJECTION INTRAMUSCULAR; INTRAVENOUS ONCE
Status: COMPLETED | OUTPATIENT
Start: 2022-11-08 | End: 2022-11-08

## 2022-11-08 RX ORDER — INSULIN LISPRO 100 [IU]/ML
10 INJECTION, SOLUTION INTRAVENOUS; SUBCUTANEOUS ONCE
Status: COMPLETED | OUTPATIENT
Start: 2022-11-08 | End: 2022-11-08

## 2022-11-08 RX ORDER — LIDOCAINE 4 G/G
1 PATCH TOPICAL DAILY
Qty: 10 EACH | Refills: 0 | Status: SHIPPED | OUTPATIENT
Start: 2022-11-08 | End: 2022-11-18

## 2022-11-08 RX ORDER — CYCLOBENZAPRINE HCL 5 MG
5 TABLET ORAL 2 TIMES DAILY PRN
Qty: 10 TABLET | Refills: 0 | Status: SHIPPED | OUTPATIENT
Start: 2022-11-08 | End: 2022-11-18

## 2022-11-08 RX ORDER — LIDOCAINE 4 G/G
1 PATCH TOPICAL ONCE
Status: DISCONTINUED | OUTPATIENT
Start: 2022-11-08 | End: 2022-11-08 | Stop reason: HOSPADM

## 2022-11-08 RX ADMIN — INSULIN LISPRO 10 UNITS: 100 INJECTION, SOLUTION INTRAVENOUS; SUBCUTANEOUS at 14:38

## 2022-11-08 RX ADMIN — ORPHENADRINE CITRATE 60 MG: 30 INJECTION INTRAMUSCULAR; INTRAVENOUS at 12:40

## 2022-11-08 RX ADMIN — KETOROLAC TROMETHAMINE 15 MG: 15 INJECTION, SOLUTION INTRAMUSCULAR; INTRAVENOUS at 12:50

## 2022-11-08 ASSESSMENT — PAIN SCALES - GENERAL
PAINLEVEL_OUTOF10: 10
PAINLEVEL_OUTOF10: 9
PAINLEVEL_OUTOF10: 9

## 2022-11-08 ASSESSMENT — ENCOUNTER SYMPTOMS
VOMITING: 0
DIARRHEA: 0
PHOTOPHOBIA: 0
ABDOMINAL PAIN: 0
COUGH: 0
SHORTNESS OF BREATH: 0

## 2022-11-08 ASSESSMENT — PAIN DESCRIPTION - DESCRIPTORS: DESCRIPTORS: ACHING;DULL

## 2022-11-08 ASSESSMENT — PAIN DESCRIPTION - LOCATION
LOCATION: LEG

## 2022-11-08 ASSESSMENT — PAIN DESCRIPTION - ORIENTATION
ORIENTATION: RIGHT

## 2022-11-08 ASSESSMENT — PAIN - FUNCTIONAL ASSESSMENT: PAIN_FUNCTIONAL_ASSESSMENT: 0-10

## 2022-11-08 NOTE — ED NOTES
Writer assisted patient with contacted [employer] transportation at discharge as patient has services with them through senior transportation.       TAO De Guzman  11/08/22 0828

## 2022-11-08 NOTE — TELEPHONE ENCOUNTER
Pt called stating she contacted her pcp for help and advice in regards to her spasms, she  states she cant take it anymore and doesn't understand what her pcp cant do that the ED does  ,pcp doesn't have any sooner appts ,pt states shell go to the ED since pcp wont do anything . Pt doesn't want to see another provider at this time

## 2022-11-08 NOTE — DISCHARGE INSTRUCTIONS
Your Doppler studies did not show any DVT. You will be discharged. You will be discharged with Lidocaine patch and Flexeril.   If it anytime you do have worsening symptoms, please return to emergency room as soon as possible

## 2022-11-08 NOTE — ED NOTES
Patient to u/s dopplers via transport, stable     Jessica Dooley [facility]  11/08/22 9902

## 2022-11-08 NOTE — ED PROVIDER NOTES
[facility] ED  Emergency Department Encounter  Emergency Medicine Resident     Pt Name: Hanna Reyngaa  MRN: [id number]  Rozinagfceline 1950  Date of evaluation: 11/8/22  PCP:  VERONIKA Naranjo [id number]       Chief Complaint   Patient presents with    Leg Pain     RLE, \"spasm like pain\" that started last night. Denies injuries or falls       HISTORY OFPRESENT ILLNESS  (Location/Symptom, Timing/Onset, Context/Setting, Quality, Duration, Modifying Factors,Severity.)      Hanna Reynaga is a 66 y. o.yo female who presents with recent hospitalization, presenting with complaints of right lower extremity calf tenderness with no swelling. Patient states that she noticed the pain started 2 days ago however is progressively worsening. Patient denies any numbness or tingling distally. Patient does have a documented history of A. fib, not on any blood thinners currently. She otherwise denies any chest pain, shortness of breath, fever, chills. Patient states that she does not remember injuring her leg. She does state that she has been taking muscle spasm medication with Tylenol Motrin without any alleviation of her symptoms.   PAST MEDICAL / SURGICAL / SOCIAL / FAMILY HISTORY      has a past medical history of Abdominal bloating, Adenomatous polyp of ascending colon, Allergic rhinitis, Anxiety, Arthritis, Asthma, Atrial fibrillation ([facility]), Back pain, Benign hypertension with CKD (chronic kidney disease) stage III ([address] CAD (coronary artery disease), Caffeine use, CHF (congestive heart failure) ([address] Chronic kidney disease, CKD (chronic kidney disease) stage 3, GFR 30-59 ml/min ([facility]), COPD (chronic obstructive pulmonary disease) ([address] Degeneration of lumbar or lumbosacral intervertebral disc, Depression, DM (diabetes mellitus) ([address] Emphysema of lung ([address] Gastritis, GERD (gastroesophageal reflux disease), Hearing loss, Hematuria, Hiatal hernia, HTN (hypertension), benign, Hypertriglyceridemia, Incontinence of urine, Irritable bowel syndrome with both constipation and diarrhea, Knee arthropathy, Lumbosacral spondylosis without myelopathy, Migraine headache, Mumps, Neuropathy, Obesity, On home oxygen therapy, HIGINIO on CPAP, Otitis media, Secondary diabetes mellitus with stage 3 chronic kidney disease (GFR 30-59) ([facility] [address] Stroke ([facility] [address] Tinnitus, Type 2 diabetes mellitus without complication ([facility] [address] Type II or unspecified type diabetes mellitus without mention of complication, not stated as uncontrolled, UTI (lower urinary tract infection), and Varicose vein. has a past surgical history that includes Cholecystectomy (); Carpal tunnel release (Right); Tympanoplasty; Dilation & curettage (); Cystocopy (2013); Cataract removal with implant (Bilateral); Tonsillectomy; Incontinence surgery; ablation of dysrhythmic focus (); Upper gastrointestinal endoscopy (2016); eye surgery; Tubal ligation; other surgical history (09/10/15); Insertable Cardiac Monitor (2015); Tympanoplasty; Colonoscopy; Colonoscopy (04/10/2017); pr colsc flx w/removal lesion by hot bx forceps (N/A, 4/10/2017); Tympanostomy tube placement (2017); Upper gastrointestinal endoscopy (N/A, 3/2/2021); and Colonoscopy (N/A, 3/2/2021).      Social History     Socioeconomic History    Marital status: Legally      Spouse name: Not on file    Number of children: Not on file    Years of education: Not on file    Highest education level: Not on file   Occupational History    Occupation: disabled     Employer: N/A   Tobacco Use    Smoking status: Former     Packs/day: 3.00     Years: 41.00     Pack years: 123.00     Types: Cigarettes     Quit date: 2000     Years since quittin.8    Smokeless tobacco: Never    Tobacco comments:     quit in    Vaping Use    Vaping Use: Never used   Substance and Sexual Activity    Alcohol use: No     Alcohol/week: 0.0 standard drinks    Drug use: No    Sexual activity: Not Currently   Other Topics Concern    Not on file   Social History Narrative    Not on file     Social Determinants of Health     Financial Resource Strain: Low Risk     Difficulty of Paying Living Expenses: Not hard at all   Food Insecurity: No Food Insecurity    Worried About Running Out of Food in the Last Year: Never true    Ran Out of Food in the Last Year: Never true   Transportation Needs: Not on file   Physical Activity: Not on file   Stress: Not on file   Social Connections: Not on file   Intimate Partner Violence: Not on file   Housing Stability: Not on file       Family History   Problem Relation Age of Onset    Diabetes Mother     Heart Disease Mother     Stomach Cancer Father     Diabetes Maternal Grandmother         also aunts and uncles (maternal)    Emphysema Sister         Allergies:  Robitussin [guaifenesin], Codeine, Compazine [prochlorperazine maleate], Iodides, Morphine, Moxifloxacin, Reglan [metoclopramide], Avelox [moxifloxacin hcl in nacl], Cipro xr, Sulfa antibiotics, and Zofran [ondansetron hcl]    Home Medications:  Prior to Admission medications    Medication Sig Start Date End Date Taking?  Authorizing Provider   lidocaine 4 % external patch Place 1 patch onto the skin daily for 10 days 11/8/22 11/18/22 Yes Adrianne Douglas MD   cyclobenzaprine (FLEXERIL) 5 MG tablet Take 1 tablet by mouth 2 times daily as needed for Muscle spasms 11/8/22 11/18/22 Yes Ella Ochoa MD   TRULICITY 1.5 NP/1.2XW SC injection INJECT ONE AND A HALF (1 & 1/2) MG INTO THE SKIN ONCE A WEEK STOP 0.75 10/31/22   VERONIKA Gaviria CNP   metFORMIN (GLUCOPHAGE) 1000 MG tablet Take 1 tablet by mouth 2 times daily (with meals) 10/21/22 11/20/22  VERONIKA Carranza CNP   escitalopram (LEXAPRO) 10 MG tablet Take 1 tablet by mouth daily 10/13/22 1/11/23  VERONIKA Gaviria CNP   furosemide (LASIX) 20 MG tablet TAKE 1 TABLET BY MOUTH ONCE DAILY AS NEEDED 10/13/22   Sherron Powell VERONIKA Langley CNP   oxyCODONE-acetaminophen (PERCOCET) 5-325 MG per tablet Take 1 tablet by mouth every 8 hours as needed for Pain for up to 30 days. Intended supply: 30 days. 10/15/22 11/14/22  VERONIKA Fallon CNP   oxybutynin (DITROPAN-XL) 5 MG extended release tablet TAKE 1 TABLET BY MOUTH DAILY 9/26/22   Sallye Loop, VERONIKA Dalton CNP   budesonide-formoterol (SYMBICORT) 160-4.5 MCG/ACT AERO Inhale 2 puffs into the lungs in the morning and 2 puffs before bedtime. As needed for sob. 7/27/22   Jose Martin Hagan MD   blood glucose test strips (TRUE METRIX BLOOD GLUCOSE TEST) strip THREE TIMES A DAY 7/27/22   Jose Martin Hagan MD   blood glucose test strips (ONETOUCH ULTRA) strip TEST TWO (2) TO THREE (3) TIMES A DAY & AS NEEDED FOR SYMPTOMS OF IRREGULAR BLOOD GLUCOSE 7/27/22   Jose Martin Hagan MD   blood glucose test strips (ONETOUCH ULTRA) strip As needed.  7/27/22   Jose Martin Hagan MD   vitamin B-12 (CYANOCOBALAMIN) 100 MCG tablet take half a tablet BY MOUTH ONCE DAILY 7/27/22   Jose Martin Hagan MD   Melatonin 10 MG TABS Take 10 mg by mouth nightly 7/27/22   Jose Martin Hagan MD   dicyclomine (BENTYL) 10 MG capsule TAKE 1 CAPSULE BY MOUTH TWICE A DAY AS NEEDED FOR ABDOMINAL SPASMS 7/27/22   Jose Martin Hagan MD    MG capsule TAKE 1 CAPSULE BY MOUTH TWICE A DAY 7/18/22   Sallye Loop, APRN - CNP   SM ASPIRIN ADULT LOW STRENGTH 81 MG EC tablet TAKE 1 TABLET BY MOUTH ONCE DAILY 7/18/22   Sallye Loop, VERONIKA - CNP   pantoprazole (PROTONIX) 40 MG tablet TAKE 1 TABLET BY MOUTH TWICE A DAY 7/18/22   Sallye Loop, VERONIKA - CNP   isosorbide dinitrate (ISORDIL) 30 MG tablet TAKE 1 TABLET BY MOUTH ONCE DAILY 7/18/22   Sallye Loop, VERONIKA - CNP   FEROSUL 325 (65 Fe) MG tablet TAKE 1 TAB BY MOUTH IN THE MORNING 6/20/22   VERONIKA Snowden - CNP   clopidogrel (PLAVIX) 75 MG tablet TAKE 1 TAB BY MOUTH ONCE A DAY ( IN THE MORNING ) -THIS MEDICINE MAY BE TAKENWITH OR WITHOUT FOOD 5/25/22   Jacqueline Cheung, APRN - CNP   fenofibrate (TRIGLIDE) 160 MG tablet TAKE 1 TABLET BY MOUTH DAILY 3/30/22   Moriah May MD   insulin aspart (NOVOLOG FLEXPEN) 100 UNIT/ML injection pen IF<139 NO INSULIN; 140-199-2 UN;200-249-4 UN;250-299-6 UN;300-349-8 UN;350-400=10 UN;ABOVE 400-12 UNIT(S) 3/30/22   Moriah May MD   magnesium oxide (MAG-OX) 400 MG tablet  2/21/22   Historical Provider, MD   insulin glargine (BASAGLAR KWIKPEN) 100 UNIT/ML injection pen Inject 55 Units into the skin 2 times daily 3/1/22   Moriah May MD   losartan (COZAAR) 25 MG tablet TAKE 1 TABLET BY MOUTH ONCE DAILY 2/21/22   Moriah May MD   carvedilol (COREG) 3.125 MG tablet TAKE 1 TAB BY MOUTH TWICE A DAY ( IN THE MORNING AND BEDTIME ) 1/21/22   Moriah May MD   Multiple Vitamins-Minerals (CERTAVITE/ANTIOXIDANTS) TABS TAKE 1 TABLET BY MOUTH ONCE DAILY 12/8/21   Moriah May MD   topiramate (TOPAMAX) 100 MG tablet TAKE 1 TABLET BY MOUTH NIGHTLY  10/25/21   Skye Yepez MD   atorvastatin (LIPITOR) 40 MG tablet Take 1 tablet by mouth daily 2/28/21   Moriah May MD   albuterol (PROVENTIL) (2.5 MG/3ML) 0.083% nebulizer solution Take 3 mLs by nebulization every 6 hours as needed for Wheezing 11/3/20   Domi Jeff MD   OXYGEN Inhale 3 L into the lungs     Historical Provider, MD       REVIEW OFSYSTEMS    (2-9 systems for level 4, 10 or more for level 5)      Review of Systems   Constitutional:  Negative for fatigue and fever. HENT:  Negative for congestion. Eyes:  Negative for photophobia and visual disturbance. Respiratory:  Negative for cough and shortness of breath. Cardiovascular:  Positive for leg swelling. Gastrointestinal:  Negative for abdominal pain, diarrhea and vomiting. Genitourinary:  Negative for dysuria and enuresis. Musculoskeletal:  Positive for arthralgias. Skin:  Negative for rash and wound. Neurological:  Negative for light-headedness and headaches.    Psychiatric/Behavioral:  Negative for behavioral problems and confusion. PHYSICAL EXAM   (up to 7 for level 4, 8 or more forlevel 5)      INITIAL VITALS:   ED Triage Vitals   BP Temp Temp src Pulse Resp SpO2 Height Weight   -- -- -- -- -- -- -- --       Physical Exam  Constitutional:       Appearance: Normal appearance. She is obese. HENT:      Head: Normocephalic and atraumatic. Nose: Nose normal.      Mouth/Throat:      Mouth: Mucous membranes are moist.   Eyes:      Extraocular Movements: Extraocular movements intact. Pupils: Pupils are equal, round, and reactive to light. Cardiovascular:      Rate and Rhythm: Normal rate. Pulses: Normal pulses. Pulmonary:      Effort: Pulmonary effort is normal. No respiratory distress. Comments: Patient on her baseline supplemental O2 for COPD. She is in no acute distress  Abdominal:      General: There is no distension. Palpations: Abdomen is soft. Tenderness: There is no abdominal tenderness. Musculoskeletal:         General: Tenderness present. No deformity or signs of injury. Cervical back: Normal range of motion. Comments: tenderness over her right buttocks, tenderness radiates down to the lateral aspect of her thigh and calf. Skin:     Findings: No bruising or erythema. Neurological:      General: No focal deficit present. Mental Status: She is alert.    Psychiatric:         Mood and Affect: Mood normal.         Behavior: Behavior normal.       DIFFERENTIAL  DIAGNOSIS     PLAN (LABS / IMAGING / EKG):  Orders Placed This Encounter   Procedures    VL DUP LOWER EXTREMITY VENOUS BILATERAL    POCT Glucose    POC Glucose Fingerstick    POCT glucose       MEDICATIONS ORDERED:  Orders Placed This Encounter   Medications    orphenadrine (NORFLEX) injection 60 mg    lidocaine 4 % external patch 1 patch    ketorolac (TORADOL) injection 15 mg    insulin lispro (HUMALOG) injection vial 10 Units    lidocaine 4 % external patch     Sig: Place 1 patch onto the skin daily for 10 days     Dispense:  10 each     Refill:  0    cyclobenzaprine (FLEXERIL) 5 MG tablet     Sig: Take 1 tablet by mouth 2 times daily as needed for Muscle spasms     Dispense:  10 tablet     Refill:  0         Initial MDM/Plan: 66 y.o. female who presents with right lower extremity pain. With tenderness over her right buttocks, tenderness radiates down to the lateral aspect of her thigh and calf. Impression is sciatica however given recent hospitalization, obesity, we will get a Doppler study. There is no redness to suggest any cellulitis or infection processes. Her pulses are intact distally  We will plan for Doppler study, Toradol, lidocaine patch and Norflex  If imaging negative, will discharge patient  DIAGNOSTIC RESULTS / EMERGENCYDEPARTMENT COURSE / MDM     LABS:  Labs Reviewed   POC GLUCOSE FINGERSTICK - Abnormal; Notable for the following components:       Result Value    POC Glucose 453 (*)     All other components within normal limits   POCT GLUCOSE - Normal   POCT GLUCOSE         RADIOLOGY:  No results found. EKG      All EKG's are interpreted by the Emergency Department Physicianwho either signs or Co-signs this chart in the absence of a cardiologist.    EMERGENCY DEPARTMENT COURSE:  ED Course as of 11/08/22 1508   Tue Nov 08, 2022   1443 Doppler study done, awaiting for results. [AN]   1505 Doppler studies negative for DVT. Patient to be discharged. Elizabeth Pulido He did receive 10 units of Humalog [AN]      ED Course User Index  [AN] German Goldmann, MD          PROCEDURES:  None    CONSULTS:  None    CRITICAL CARE:      FINAL IMPRESSION      1. Right leg pain    2.  Hyperglycemia          DISPOSITION / PLAN     DISPOSITION Decision To Discharge 11/08/2022 03:03:05 PM      PATIENT REFERRED TO:  [facility]  [address] [facility]  [phone]    If symptoms worsen    Chris Donato, APRN - CNP  [address]  [phone]      As needed    DISCHARGE MEDICATIONS:  New Prescriptions    CYCLOBENZAPRINE (FLEXERIL) 5 MG TABLET    Take 1 tablet by mouth 2 times daily as needed for Muscle spasms    LIDOCAINE 4 % EXTERNAL PATCH    Place 1 patch onto the skin daily for 10 days       Brenda Denise MD  Emergency Medicine Resident    (Please note that portions of this note were completed with a voice recognition program.Efforts were made to edit the dictations but occasionally words are mis-transcribed.)        Brenda Denise MD  Resident  11/08/22 5061

## 2022-11-08 NOTE — ED NOTES
Report received from [facility], all questions answered. Pt resting in bed comfortably, NAD noted. BS recheck at 1530.  Will continue to monitor     León Fontaine RN  11/08/22 6123

## 2022-11-08 NOTE — ED PROVIDER NOTES
[facility] ED     Emergency Department     Faculty Attestation    I performed a history and physical examination of the patient and discussed management with the resident. I reviewed the residents note and agree with the documented findings and plan of care. Any areas of disagreement are noted on the chart. I was personally present for the key portions of any procedures. I have documented in the chart those procedures where I was not present during the key portions. I have reviewed the emergency nurses triage note. I agree with the chief complaint, past medical history, past surgical history, allergies, medications, social and family history as documented unless otherwise noted below. For Physician Assistant/ Nurse Practitioner cases/documentation I have personally evaluated this patient and have completed at least one if not all key elements of the E/M (history, physical exam, and MDM). Additional findings are as noted. Patient here with right calf pain referred to the ED for evaluation of blood clot. No history of clots. However was just recently in the hospital she is unsure if she received any DVT prophylaxis or not. However no chest pain no shortness of breath. History of COPD baseline oxygen at home she is on her baseline nasal cannula here resting comfortably no respiratory distress. Focal right calf tenderness no visible edema although limited by habitus. Distally strong pulses intact sensation.   Will order venous Doppler      Critical Care     none    Roverto Morrison MD, [facility]  Attending Emergency  Physician           Roverto Morrison MD  11/08/22 5389

## 2022-11-08 NOTE — ED NOTES
Ice water provided to patient for comfort  Call light in reach  Additional warm blankets applied   Will continue to update      Dina Benrabe RN  11/08/22 6624

## 2022-11-08 NOTE — ED NOTES
Patient resting on bed, updated that we are awaiting doppler study results  Reports that pain has slightly improved from IM medications given   Reports RLE calf pain remains present    VSS   Call light in reach     Alen Vallejo RN  11/08/22 5556

## 2022-11-08 NOTE — ED NOTES
Patient into ER per EMS today from home for c/o RLE leg pain/spasms that started last night  Denies falls or injuries  Reports that pain started at upper part of leg and pain radiates to R lower leg  Patient reports that pain initially started in her lower back and progressed to pain in her upper R leg regions radiating to lower R leg region she also feels \"achey\" in body   States that she took her AM Gabapentin med, Bentyl and x4 Tylenol tabs without relief of pain  +p/m/s/s/x4  Denies hx of blood clots that she is aware of    Nondistressed  GCS=15  VS stable    Call light within reach  Updated on plan of care and processes       Shereen Santos RN  11/08/22 [address] [place] [address]  11/08/22 0207

## 2022-11-09 ENCOUNTER — HOSPITAL ENCOUNTER (OUTPATIENT)
Age: 72
Setting detail: OBSERVATION
Discharge: HOME | End: 2022-11-11
Attending: EMERGENCY MEDICINE | Admitting: EMERGENCY MEDICINE
Payer: COMMERCIAL

## 2022-11-09 ENCOUNTER — APPOINTMENT (OUTPATIENT)
Dept: MRI IMAGING | Age: 72
End: 2022-11-09
Payer: COMMERCIAL

## 2022-11-09 ENCOUNTER — APPOINTMENT (OUTPATIENT)
Dept: CT IMAGING | Age: 72
End: 2022-11-09
Payer: COMMERCIAL

## 2022-11-09 DIAGNOSIS — R20.0 RUE NUMBNESS: ICD-10-CM

## 2022-11-09 DIAGNOSIS — M46.1 SACROILIITIS, NOT ELSEWHERE CLASSIFIED (HCC): Chronic | ICD-10-CM

## 2022-11-09 DIAGNOSIS — M50.30 DDD (DEGENERATIVE DISC DISEASE), CERVICAL: Chronic | ICD-10-CM

## 2022-11-09 DIAGNOSIS — M47.817 LUMBOSACRAL SPONDYLOSIS WITHOUT MYELOPATHY: Chronic | ICD-10-CM

## 2022-11-09 DIAGNOSIS — M54.16 LUMBAR RADICULOPATHY, CHRONIC: ICD-10-CM

## 2022-11-09 DIAGNOSIS — R25.2 LEG CRAMPING: Primary | ICD-10-CM

## 2022-11-09 PROBLEM — I66.22 OCCLUSION OF LEFT POSTERIOR CEREBRAL ARTERY: Status: ACTIVE | Noted: 2022-11-09

## 2022-11-09 LAB
ABSOLUTE EOS #: 0.28 K/UL (ref 0–0.44)
ABSOLUTE IMMATURE GRANULOCYTE: <0.03 K/UL (ref 0–0.3)
ABSOLUTE LYMPH #: 1.61 K/UL (ref 1.1–3.7)
ABSOLUTE MONO #: 0.51 K/UL (ref 0.1–1.2)
ANION GAP SERPL CALCULATED.3IONS-SCNC: 13 MMOL/L (ref 9–17)
ANION GAP: 10 MMOL/L (ref 7–16)
BASOPHILS # BLD: 1 % (ref 0–2)
BASOPHILS ABSOLUTE: 0.05 K/UL (ref 0–0.2)
BUN BLDV-MCNC: 18 MG/DL (ref 8–23)
CALCIUM SERPL-MCNC: 9.7 MG/DL (ref 8.6–10.4)
CHLORIDE BLD-SCNC: 98 MMOL/L (ref 98–107)
CO2: 26 MMOL/L (ref 20–31)
CREAT SERPL-MCNC: 0.99 MG/DL (ref 0.5–0.9)
EGFR, POC: >60 ML/MIN/1.73M2
EOSINOPHILS RELATIVE PERCENT: 5 % (ref 1–4)
GFR SERPL CREATININE-BSD FRML MDRD: >60 ML/MIN/1.73M2
GLUCOSE BLD-MCNC: 299 MG/DL (ref 74–100)
GLUCOSE BLD-MCNC: 311 MG/DL (ref 70–99)
GLUCOSE BLD-MCNC: 366 MG/DL (ref 65–105)
HCO3 VENOUS: 29.6 MMOL/L (ref 22–29)
HCT VFR BLD CALC: 42.6 % (ref 36.3–47.1)
HEMOGLOBIN: 14.1 G/DL (ref 11.9–15.1)
IMMATURE GRANULOCYTES: 0 %
INR BLD: 1
LYMPHOCYTES # BLD: 26 % (ref 24–43)
MCH RBC QN AUTO: 30.8 PG (ref 25.2–33.5)
MCHC RBC AUTO-ENTMCNC: 33.1 G/DL (ref 28.4–34.8)
MCV RBC AUTO: 93 FL (ref 82.6–102.9)
MONOCYTES # BLD: 8 % (ref 3–12)
MYOGLOBIN: 61 NG/ML (ref 25–58)
NRBC AUTOMATED: 0 PER 100 WBC
O2 SAT, VEN: 75 % (ref 60–85)
PARTIAL THROMBOPLASTIN TIME: 21.4 SEC (ref 20.5–30.5)
PCO2, VEN: 58.4 MM HG (ref 41–51)
PDW BLD-RTO: 12.7 % (ref 11.8–14.4)
PH VENOUS: 7.31 (ref 7.32–7.43)
PLATELET # BLD: 174 K/UL (ref 138–453)
PMV BLD AUTO: 11.6 FL (ref 8.1–13.5)
PO2, VEN: 44.6 MM HG (ref 30–50)
POC BUN: 20 MG/DL (ref 8–26)
POC CHLORIDE: 101 MMOL/L (ref 98–107)
POC CREATININE: 0.95 MG/DL (ref 0.51–1.19)
POC HEMATOCRIT: 50 % (ref 36–46)
POC HEMOGLOBIN: 16.8 G/DL (ref 12–16)
POC IONIZED CALCIUM: 1.26 MMOL/L (ref 1.15–1.33)
POC LACTIC ACID: 0.88 MMOL/L (ref 0.56–1.39)
POC POTASSIUM: 4.3 MMOL/L (ref 3.5–4.5)
POC SODIUM: 140 MMOL/L (ref 138–146)
POC TCO2: 30 MMOL/L (ref 22–30)
POSITIVE BASE EXCESS, VEN: 2 (ref 0–3)
POTASSIUM SERPL-SCNC: 4.4 MMOL/L (ref 3.7–5.3)
PROTHROMBIN TIME: 10.6 SEC (ref 9.1–12.3)
RBC # BLD: 4.58 M/UL (ref 3.95–5.11)
SARS-COV-2, RAPID: NOT DETECTED
SEG NEUTROPHILS: 60 % (ref 36–65)
SEGMENTED NEUTROPHILS ABSOLUTE COUNT: 3.79 K/UL (ref 1.5–8.1)
SODIUM BLD-SCNC: 137 MMOL/L (ref 135–144)
SPECIMEN DESCRIPTION: NORMAL
TOTAL CK: 81 U/L (ref 26–192)
TROPONIN, HIGH SENSITIVITY: 12 NG/L (ref 0–14)
WBC # BLD: 6.3 K/UL (ref 3.5–11.3)

## 2022-11-09 PROCEDURE — 70551 MRI BRAIN STEM W/O DYE: CPT

## 2022-11-09 PROCEDURE — 94640 AIRWAY INHALATION TREATMENT: CPT

## 2022-11-09 PROCEDURE — 99220 PR INITIAL OBSERVATION CARE/DAY 70 MINUTES: CPT | Performed by: PSYCHIATRY & NEUROLOGY

## 2022-11-09 PROCEDURE — 82553 CREATINE MB FRACTION: CPT

## 2022-11-09 PROCEDURE — 70544 MR ANGIOGRAPHY HEAD W/O DYE: CPT

## 2022-11-09 PROCEDURE — 82565 ASSAY OF CREATININE: CPT

## 2022-11-09 PROCEDURE — 70450 CT HEAD/BRAIN W/O DYE: CPT

## 2022-11-09 PROCEDURE — 82803 BLOOD GASES ANY COMBINATION: CPT

## 2022-11-09 PROCEDURE — 84484 ASSAY OF TROPONIN QUANT: CPT

## 2022-11-09 PROCEDURE — 82947 ASSAY GLUCOSE BLOOD QUANT: CPT

## 2022-11-09 PROCEDURE — 83874 ASSAY OF MYOGLOBIN: CPT

## 2022-11-09 PROCEDURE — 87635 SARS-COV-2 COVID-19 AMP PRB: CPT

## 2022-11-09 PROCEDURE — 6370000000 HC RX 637 (ALT 250 FOR IP): Performed by: EMERGENCY MEDICINE

## 2022-11-09 PROCEDURE — G0378 HOSPITAL OBSERVATION PER HR: HCPCS

## 2022-11-09 PROCEDURE — 6370000000 HC RX 637 (ALT 250 FOR IP): Performed by: STUDENT IN AN ORGANIZED HEALTH CARE EDUCATION/TRAINING PROGRAM

## 2022-11-09 PROCEDURE — 85730 THROMBOPLASTIN TIME PARTIAL: CPT

## 2022-11-09 PROCEDURE — 85025 COMPLETE CBC W/AUTO DIFF WBC: CPT

## 2022-11-09 PROCEDURE — 6360000002 HC RX W HCPCS: Performed by: EMERGENCY MEDICINE

## 2022-11-09 PROCEDURE — 96372 THER/PROPH/DIAG INJ SC/IM: CPT

## 2022-11-09 PROCEDURE — 85610 PROTHROMBIN TIME: CPT

## 2022-11-09 PROCEDURE — 82330 ASSAY OF CALCIUM: CPT

## 2022-11-09 PROCEDURE — 85014 HEMATOCRIT: CPT

## 2022-11-09 PROCEDURE — 99204 OFFICE O/P NEW MOD 45 MIN: CPT | Performed by: PSYCHIATRY & NEUROLOGY

## 2022-11-09 PROCEDURE — 80051 ELECTROLYTE PANEL: CPT

## 2022-11-09 PROCEDURE — 80048 BASIC METABOLIC PNL TOTAL CA: CPT

## 2022-11-09 PROCEDURE — 82550 ASSAY OF CK (CPK): CPT

## 2022-11-09 PROCEDURE — 84520 ASSAY OF UREA NITROGEN: CPT

## 2022-11-09 PROCEDURE — 99285 EMERGENCY DEPT VISIT HI MDM: CPT

## 2022-11-09 PROCEDURE — 83605 ASSAY OF LACTIC ACID: CPT

## 2022-11-09 RX ORDER — ONDANSETRON 2 MG/ML
4 INJECTION INTRAMUSCULAR; INTRAVENOUS EVERY 6 HOURS PRN
Status: DISCONTINUED | OUTPATIENT
Start: 2022-11-09 | End: 2022-11-11 | Stop reason: HOSPADM

## 2022-11-09 RX ORDER — ASPIRIN 81 MG/1
81 TABLET ORAL DAILY
Status: DISCONTINUED | OUTPATIENT
Start: 2022-11-10 | End: 2022-11-11 | Stop reason: HOSPADM

## 2022-11-09 RX ORDER — CLOPIDOGREL 300 MG/1
300 TABLET, FILM COATED ORAL ONCE
Status: COMPLETED | OUTPATIENT
Start: 2022-11-09 | End: 2022-11-09

## 2022-11-09 RX ORDER — CARVEDILOL 3.12 MG/1
3.12 TABLET ORAL 2 TIMES DAILY
Status: DISCONTINUED | OUTPATIENT
Start: 2022-11-09 | End: 2022-11-11 | Stop reason: HOSPADM

## 2022-11-09 RX ORDER — ORPHENADRINE CITRATE 30 MG/ML
60 INJECTION INTRAMUSCULAR; INTRAVENOUS ONCE
Status: COMPLETED | OUTPATIENT
Start: 2022-11-09 | End: 2022-11-09

## 2022-11-09 RX ORDER — CLOPIDOGREL BISULFATE 75 MG/1
75 TABLET ORAL DAILY
Status: DISCONTINUED | OUTPATIENT
Start: 2022-11-10 | End: 2022-11-11 | Stop reason: HOSPADM

## 2022-11-09 RX ORDER — TOPIRAMATE 100 MG/1
100 TABLET, FILM COATED ORAL NIGHTLY
Status: DISCONTINUED | OUTPATIENT
Start: 2022-11-09 | End: 2022-11-11 | Stop reason: HOSPADM

## 2022-11-09 RX ORDER — SODIUM CHLORIDE 0.9 % (FLUSH) 0.9 %
5-40 SYRINGE (ML) INJECTION PRN
Status: DISCONTINUED | OUTPATIENT
Start: 2022-11-09 | End: 2022-11-11 | Stop reason: HOSPADM

## 2022-11-09 RX ORDER — SODIUM CHLORIDE 0.9 % (FLUSH) 0.9 %
5-40 SYRINGE (ML) INJECTION EVERY 12 HOURS SCHEDULED
Status: DISCONTINUED | OUTPATIENT
Start: 2022-11-09 | End: 2022-11-11 | Stop reason: HOSPADM

## 2022-11-09 RX ORDER — INSULIN GLARGINE 100 [IU]/ML
55 INJECTION, SOLUTION SUBCUTANEOUS 2 TIMES DAILY
Status: DISCONTINUED | OUTPATIENT
Start: 2022-11-09 | End: 2022-11-11 | Stop reason: HOSPADM

## 2022-11-09 RX ORDER — BUDESONIDE AND FORMOTEROL FUMARATE DIHYDRATE 160; 4.5 UG/1; UG/1
2 AEROSOL RESPIRATORY (INHALATION) 2 TIMES DAILY
Status: DISCONTINUED | OUTPATIENT
Start: 2022-11-09 | End: 2022-11-11 | Stop reason: HOSPADM

## 2022-11-09 RX ORDER — ENOXAPARIN SODIUM 100 MG/ML
40 INJECTION SUBCUTANEOUS DAILY
Status: DISCONTINUED | OUTPATIENT
Start: 2022-11-09 | End: 2022-11-11 | Stop reason: HOSPADM

## 2022-11-09 RX ORDER — ALBUTEROL SULFATE 2.5 MG/3ML
2.5 SOLUTION RESPIRATORY (INHALATION) EVERY 6 HOURS PRN
Status: DISCONTINUED | OUTPATIENT
Start: 2022-11-09 | End: 2022-11-11 | Stop reason: HOSPADM

## 2022-11-09 RX ORDER — LOSARTAN POTASSIUM 25 MG/1
25 TABLET ORAL DAILY
Status: DISCONTINUED | OUTPATIENT
Start: 2022-11-09 | End: 2022-11-11 | Stop reason: HOSPADM

## 2022-11-09 RX ORDER — ASPIRIN 325 MG
325 TABLET ORAL ONCE
Status: COMPLETED | OUTPATIENT
Start: 2022-11-09 | End: 2022-11-09

## 2022-11-09 RX ORDER — CHOLECALCIFEROL (VITAMIN D3) 125 MCG
10 CAPSULE ORAL NIGHTLY
Status: DISCONTINUED | OUTPATIENT
Start: 2022-11-09 | End: 2022-11-11 | Stop reason: HOSPADM

## 2022-11-09 RX ORDER — ATORVASTATIN CALCIUM 40 MG/1
40 TABLET, FILM COATED ORAL DAILY
Status: DISCONTINUED | OUTPATIENT
Start: 2022-11-09 | End: 2022-11-11 | Stop reason: HOSPADM

## 2022-11-09 RX ORDER — SODIUM CHLORIDE 9 MG/ML
INJECTION, SOLUTION INTRAVENOUS PRN
Status: DISCONTINUED | OUTPATIENT
Start: 2022-11-09 | End: 2022-11-11 | Stop reason: HOSPADM

## 2022-11-09 RX ORDER — ACETAMINOPHEN 325 MG/1
650 TABLET ORAL EVERY 4 HOURS PRN
Status: DISCONTINUED | OUTPATIENT
Start: 2022-11-09 | End: 2022-11-11 | Stop reason: HOSPADM

## 2022-11-09 RX ORDER — FUROSEMIDE 20 MG/1
20 TABLET ORAL DAILY
Status: DISCONTINUED | OUTPATIENT
Start: 2022-11-09 | End: 2022-11-11 | Stop reason: HOSPADM

## 2022-11-09 RX ORDER — PANTOPRAZOLE SODIUM 40 MG/1
40 TABLET, DELAYED RELEASE ORAL
Status: DISCONTINUED | OUTPATIENT
Start: 2022-11-09 | End: 2022-11-11 | Stop reason: HOSPADM

## 2022-11-09 RX ORDER — LANOLIN ALCOHOL/MO/W.PET/CERES
325 CREAM (GRAM) TOPICAL
Status: DISCONTINUED | OUTPATIENT
Start: 2022-11-10 | End: 2022-11-11 | Stop reason: HOSPADM

## 2022-11-09 RX ORDER — ONDANSETRON 4 MG/1
4 TABLET, ORALLY DISINTEGRATING ORAL EVERY 8 HOURS PRN
Status: DISCONTINUED | OUTPATIENT
Start: 2022-11-09 | End: 2022-11-11 | Stop reason: HOSPADM

## 2022-11-09 RX ORDER — OXYBUTYNIN CHLORIDE 5 MG/1
5 TABLET, EXTENDED RELEASE ORAL DAILY
Status: DISCONTINUED | OUTPATIENT
Start: 2022-11-09 | End: 2022-11-11 | Stop reason: HOSPADM

## 2022-11-09 RX ORDER — ESCITALOPRAM OXALATE 10 MG/1
10 TABLET ORAL DAILY
Status: DISCONTINUED | OUTPATIENT
Start: 2022-11-09 | End: 2022-11-11 | Stop reason: HOSPADM

## 2022-11-09 RX ORDER — ISOSORBIDE DINITRATE 10 MG/1
30 TABLET ORAL DAILY
Status: DISCONTINUED | OUTPATIENT
Start: 2022-11-09 | End: 2022-11-11 | Stop reason: HOSPADM

## 2022-11-09 RX ORDER — CYCLOBENZAPRINE HCL 10 MG
5 TABLET ORAL 2 TIMES DAILY PRN
Status: DISCONTINUED | OUTPATIENT
Start: 2022-11-09 | End: 2022-11-10

## 2022-11-09 RX ORDER — OXYCODONE HYDROCHLORIDE AND ACETAMINOPHEN 5; 325 MG/1; MG/1
1 TABLET ORAL EVERY 8 HOURS PRN
Status: DISCONTINUED | OUTPATIENT
Start: 2022-11-09 | End: 2022-11-11 | Stop reason: HOSPADM

## 2022-11-09 RX ORDER — FENOFIBRATE 160 MG/1
160 TABLET ORAL DAILY
Status: DISCONTINUED | OUTPATIENT
Start: 2022-11-09 | End: 2022-11-11 | Stop reason: HOSPADM

## 2022-11-09 RX ADMIN — ATORVASTATIN CALCIUM 40 MG: 80 TABLET, FILM COATED ORAL at 22:01

## 2022-11-09 RX ADMIN — INSULIN GLARGINE 55 UNITS: 100 INJECTION, SOLUTION SUBCUTANEOUS at 21:53

## 2022-11-09 RX ADMIN — ORPHENADRINE CITRATE 60 MG: 30 INJECTION INTRAMUSCULAR; INTRAVENOUS at 08:40

## 2022-11-09 RX ADMIN — FENOFIBRATE 160 MG: 160 TABLET ORAL at 17:25

## 2022-11-09 RX ADMIN — ASPIRIN 325 MG: 325 TABLET ORAL at 08:19

## 2022-11-09 RX ADMIN — OXYCODONE HYDROCHLORIDE AND ACETAMINOPHEN 1 TABLET: 5; 325 TABLET ORAL at 21:54

## 2022-11-09 RX ADMIN — OXYBUTYNIN CHLORIDE 5 MG: 5 TABLET, EXTENDED RELEASE ORAL at 17:24

## 2022-11-09 RX ADMIN — LOSARTAN POTASSIUM 25 MG: 25 TABLET, FILM COATED ORAL at 17:25

## 2022-11-09 RX ADMIN — CLOPIDOGREL BISULFATE 300 MG: 300 TABLET, FILM COATED ORAL at 08:19

## 2022-11-09 RX ADMIN — ENOXAPARIN SODIUM 40 MG: 40 INJECTION SUBCUTANEOUS at 18:49

## 2022-11-09 RX ADMIN — CARVEDILOL 3.12 MG: 3.12 TABLET, FILM COATED ORAL at 19:55

## 2022-11-09 RX ADMIN — BUDESONIDE AND FORMOTEROL FUMARATE DIHYDRATE 2 PUFF: 160; 4.5 AEROSOL RESPIRATORY (INHALATION) at 20:19

## 2022-11-09 RX ADMIN — PANTOPRAZOLE SODIUM 40 MG: 40 TABLET, DELAYED RELEASE ORAL at 22:01

## 2022-11-09 RX ADMIN — ISOSORBIDE DINITRATE 30 MG: 10 TABLET ORAL at 17:22

## 2022-11-09 RX ADMIN — METFORMIN HYDROCHLORIDE 1000 MG: 500 TABLET ORAL at 17:22

## 2022-11-09 RX ADMIN — Medication 10 MG: at 19:54

## 2022-11-09 RX ADMIN — TOPIRAMATE 100 MG: 100 TABLET, FILM COATED ORAL at 21:56

## 2022-11-09 RX ADMIN — ESCITALOPRAM OXALATE 10 MG: 10 TABLET ORAL at 17:24

## 2022-11-09 ASSESSMENT — ENCOUNTER SYMPTOMS
COUGH: 0
NAUSEA: 0
ABDOMINAL DISTENTION: 0
DIARRHEA: 0
CONSTIPATION: 0
VOMITING: 0
RHINORRHEA: 0
WHEEZING: 0
SHORTNESS OF BREATH: 0
SORE THROAT: 0

## 2022-11-09 ASSESSMENT — PAIN DESCRIPTION - DESCRIPTORS: DESCRIPTORS: CRAMPING

## 2022-11-09 ASSESSMENT — PAIN DESCRIPTION - ORIENTATION
ORIENTATION: RIGHT
ORIENTATION: LOWER

## 2022-11-09 ASSESSMENT — PAIN - FUNCTIONAL ASSESSMENT: PAIN_FUNCTIONAL_ASSESSMENT: 0-10

## 2022-11-09 ASSESSMENT — PAIN SCALES - GENERAL
PAINLEVEL_OUTOF10: 8
PAINLEVEL_OUTOF10: 10
PAINLEVEL_OUTOF10: 10

## 2022-11-09 ASSESSMENT — PAIN DESCRIPTION - LOCATION
LOCATION: LEG
LOCATION: LEG

## 2022-11-09 NOTE — ED NOTES
Report given to Cyndi Michelle, REUBEN  No change in patient status  Continues to rest quietly       [place] Stevenson, [facility]  11/09/22 3979

## 2022-11-09 NOTE — ED NOTES
NAN at bedside and MRI check list reported as completed and faxed, per Neuro team pt to go to MRI unescorted. Monitor placed.       Mc Mackey RN  11/09/22 6516

## 2022-11-09 NOTE — ED NOTES
Pt remains resting up in chair per pt's request, tolerating well  Talking on phone without distress  Tray ordered       Collier REUBEN Barrios  11/09/22 0226

## 2022-11-09 NOTE — CONSULTS
[facility]   IN-PATIENT SERVICE      NEUROLOGY CONSULT  NOTE            Date:   11/9/2022  Patient name:  Raymond Cortes  Date of admission:  11/9/2022  YOB: 1950      CHIEF COMPLAINT:       Right sided paresthesia     HISTORY OF PRESENT ILLNESS:       The patient is a 66 y.o. female who presents with right upper arm tingling sensation. Last know well: 9:30 PM on 11/8/2022.    69-year-old female with past medical history of COPD, diabetes, migraine, headache, CAD, hypertension, A. fib s/p radiofrequency 2001, presented with right arm tingling sensation started 5:30 AM this morning  Patient was seen in the ER on 9/8/2022 when she presented with right calf spasming, she was evaluated in the ER, lower extremity Doppler was done which was negative and patient was discharged home. Patient has remote hx of carpel tunnel surgery on right hand, Tinel sign positive. On presentation:  BP:139/102  BSL:453  NIH 2  MRI : no acute infract   MRA head and neck :  Remote left p2 occlusion     Patient was loaded with aspirin 325 and [id number]  Prior to arrival patient was on  Antiplatelets/anticoagulants:  Patient is not sure what she is taking she was suppose to be on Plavix   Statins: Lipitor 40 mg       Smoking history: former smoker, quit 20 years ago       PAST MEDICAL HISTORY :       Past Medical History:        Diagnosis Date    Abdominal bloating 1/26/2021    Adenomatous polyp of ascending colon 1/26/2021    Allergic rhinitis     Anxiety 7/17/2013    Arthritis     Asthma     Atrial fibrillation (HCC)     Back pain     NERVE/DR. GRACIA    Benign hypertension with CKD (chronic kidney disease) stage III ([facility])     CAD (coronary artery disease) 3/21/2013    Caffeine use     2 coffee/day    CHF (congestive heart failure) ([facility])     Chronic kidney disease     CKD (chronic kidney disease) stage 3, GFR 30-59 ml/min ([facility])     COPD (chronic obstructive pulmonary disease) ([facility])     emphysema Degeneration of lumbar or lumbosacral intervertebral disc     Depression     DM (diabetes mellitus) ([address]     Emphysema of lung (HCC)     Gastritis     GERD (gastroesophageal reflux disease)     Hearing loss     Hematuria     Hiatal hernia     HTN (hypertension), benign 6/28/2014    Hypertriglyceridemia     Incontinence of urine 9/25/2013    Irritable bowel syndrome with both constipation and diarrhea 1/26/2021    Knee arthropathy     Lumbosacral spondylosis without myelopathy 9/29/2014    Migraine headache     DR. GRACIA    [place]     Neuropathy     Obesity     On home oxygen therapy     2 L PER NC  HS AND PRN    HIGINIO on CPAP     Otitis media 1-26-15    Secondary diabetes mellitus with stage 3 chronic kidney disease (GFR 30-59) (HCC)     Stroke (HCC)      mini stroke. Tinnitus     Type 2 diabetes mellitus without complication (HCC)     Type II or unspecified type diabetes mellitus without mention of complication, not stated as uncontrolled     UTI (lower urinary tract infection)     Varicose vein        Past Surgical History:        Procedure Laterality Date    ABLATION OF DYSRHYTHMIC FOCUS  2000    CARPAL TUNNEL RELEASE Right     CATARACT REMOVAL WITH IMPLANT Bilateral     CHOLECYSTECTOMY  2007    COLONOSCOPY      COLONOSCOPY  04/10/2017    tubular adenomo polyp , mod. sigmoid diverticulosis, min. int. hemorrhoids    COLONOSCOPY N/A 3/2/2021    COLONOSCOPY WITH BIOPSY performed by Boom Samuels MD at [address] [place] [address]  9/25/2013    DILATION AND CURETTAGE  1979    EYE SURGERY      laser    INCONTINENCE SURGERY      Percutaneous nerve stimulation Dr Bard Annetta Amaya 2013     INSERTABLE CARDIAC MONITOR  12/04/2015    [employer] REVEAL LINQ MODEL #[id number] MRI CONDTIONAL OK 3T.  IMMEDIATELY POST IMPLANT    OTHER SURGICAL HISTORY  09/10/15    removal of interstim    SD COLSC FLX W/REMOVAL LESION BY HOT BX FORCEPS N/A 4/10/2017    COLONOSCOPY POLYPECTOMY HOT BIOPSY performed by Katia Hallman MD at [address] [place] [address] TUBAL LIGATION      TYMPANOPLASTY      TYMPANOPLASTY      TYMPANOSTOMY TUBE PLACEMENT  2017    UPPER GASTROINTESTINAL ENDOSCOPY  2016    Dr Grey Head    UPPER GASTROINTESTINAL ENDOSCOPY N/A 3/2/2021    EGD BIOPSY performed by Evelyn Stephens MD at [facility]       Social History:   Social History     Socioeconomic History    Marital status: Legally      Spouse name: Not on file    Number of children: Not on file    Years of education: Not on file    Highest education level: Not on file   Occupational History    Occupation: disabled     Employer: N/A   Tobacco Use    Smoking status: Former     Packs/day: 3.00     Years: 41.00     Pack years: 123.00     Types: Cigarettes     Quit date: 2000     Years since quittin.8    Smokeless tobacco: Never    Tobacco comments:     quit in    Vaping Use    Vaping Use: Never used   Substance and Sexual Activity    Alcohol use: No     Alcohol/week: 0.0 standard drinks    Drug use: No    Sexual activity: Not Currently   Other Topics Concern    Not on file   Social History Narrative    Not on file     Social Determinants of Health     Financial Resource Strain: Low Risk     Difficulty of Paying Living Expenses: Not hard at all   Food Insecurity: No Food Insecurity    Worried About Running Out of Food in the Last Year: Never true    Ran Out of Food in the Last Year: Never true   Transportation Needs: Not on file   Physical Activity: Not on file   Stress: Not on file   Social Connections: Not on file   Intimate Partner Violence: Not on file   Housing Stability: Not on file       Family History:       Problem Relation Age of Onset    Diabetes Mother     Heart Disease Mother     Stomach Cancer Father     Diabetes Maternal Grandmother         also aunts and uncles (maternal)    Emphysema Sister        Allergies:  Robitussin [guaifenesin], Codeine, Compazine [prochlorperazine maleate], Iodides, Morphine, Moxifloxacin, Reglan [metoclopramide], Avelox [moxifloxacin hcl in nacl], Cipro xr, Sulfa antibiotics, and Zofran [ondansetron hcl]    Home Medications:  Prior to Admission medications    Medication Sig Start Date End Date Taking? Authorizing Provider   lidocaine 4 % external patch Place 1 patch onto the skin daily for 10 days 11/8/22 11/18/22  Adrianne Ramos MD   cyclobenzaprine (FLEXERIL) 5 MG tablet Take 1 tablet by mouth 2 times daily as needed for Muscle spasms 11/8/22 11/18/22  Adrianne Watters MD   TRULICITY 1.5 RD/7.1AZ SC injection INJECT ONE AND A HALF (1 & 1/2) MG INTO THE SKIN ONCE A WEEK STOP 0.75 10/31/22   VERONIKA Recinos CNP   metFORMIN (GLUCOPHAGE) 1000 MG tablet Take 1 tablet by mouth 2 times daily (with meals) 10/21/22 11/20/22  DenaeVERONIKA José CNP   escitalopram (LEXAPRO) 10 MG tablet Take 1 tablet by mouth daily 10/13/22 1/11/23  VERONIKA Recinos CNP   furosemide (LASIX) 20 MG tablet TAKE 1 TABLET BY MOUTH ONCE DAILY AS NEEDED 10/13/22   VERONIKA Recinos CNP   oxyCODONE-acetaminophen (PERCOCET) 5-325 MG per tablet Take 1 tablet by mouth every 8 hours as needed for Pain for up to 30 days. Intended supply: 30 days. 10/15/22 11/14/22  VERONIKA Burt CNP   oxybutynin (DITROPAN-XL) 5 MG extended release tablet TAKE 1 TABLET BY MOUTH DAILY 9/26/22   VERONIKA Recinos CNP   budesonide-formoterol (SYMBICORT) 160-4.5 MCG/ACT AERO Inhale 2 puffs into the lungs in the morning and 2 puffs before bedtime. As needed for sob. 7/27/22   Darlin Rubio MD   blood glucose test strips (TRUE METRIX BLOOD GLUCOSE TEST) strip THREE TIMES A DAY 7/27/22   Darlin Rubio MD   blood glucose test strips (ONETOUCH ULTRA) strip TEST TWO (2) TO THREE (3) TIMES A DAY & AS NEEDED FOR SYMPTOMS OF IRREGULAR BLOOD GLUCOSE 7/27/22   Darlin Rubio MD   blood glucose test strips (ONETOUCH ULTRA) strip As needed.  7/27/22   Darlin Rubio MD   vitamin B-12 (CYANOCOBALAMIN) 100 MCG tablet take half a tablet BY MOUTH ONCE DAILY 7/27/22 Svetlana Sage MD   Melatonin 10 MG TABS Take 10 mg by mouth nightly 7/27/22   Svetlana Sage MD   dicyclomine (BENTYL) 10 MG capsule TAKE 1 CAPSULE BY MOUTH TWICE A DAY AS NEEDED FOR ABDOMINAL SPASMS 7/27/22   Svetlana Sage MD    MG capsule TAKE 1 CAPSULE BY MOUTH TWICE A DAY 7/18/22   VERONIKA Parnell CNP   SM ASPIRIN ADULT LOW STRENGTH 81 MG EC tablet TAKE 1 TABLET BY MOUTH ONCE DAILY 7/18/22   VERONIKA Parnell CNP   pantoprazole (PROTONIX) 40 MG tablet TAKE 1 TABLET BY MOUTH TWICE A DAY 7/18/22   VERONIKA Parnell CNP   isosorbide dinitrate (ISORDIL) 30 MG tablet TAKE 1 TABLET BY MOUTH ONCE DAILY 7/18/22   VERONIKA Parnell CNP   FEROSUL 325 (65 Fe) MG tablet TAKE 1 TAB BY MOUTH IN THE MORNING 6/20/22   VERONIKA Snowden CNP   clopidogrel (PLAVIX) 75 MG tablet TAKE 1 TAB BY MOUTH ONCE A DAY ( IN THE MORNING ) -THIS MEDICINE MAY BE TAKENWITH OR WITHOUT FOOD 5/25/22   VERONIKA Snowden CNP   fenofibrate (TRIGLIDE) 160 MG tablet TAKE 1 TABLET BY MOUTH DAILY 3/30/22   Coty Estrada MD   insulin aspart (NOVOLOG FLEXPEN) 100 UNIT/ML injection pen IF<139 NO INSULIN; 140-199-2 UN;200-249-4 UN;250-299-6 YG;391-129-0 UN;350-400=10 UN;ABOVE 400-12 UNIT(S) 3/30/22   Coty Estrada MD   magnesium oxide (MAG-OX) 400 MG tablet  2/21/22   Nick Mcknight MD   insulin glargine (BASAGLAR KWIKPEN) 100 UNIT/ML injection pen Inject 55 Units into the skin 2 times daily 3/1/22   Coty Estrada MD   losartan (COZAAR) 25 MG tablet TAKE 1 TABLET BY MOUTH ONCE DAILY 2/21/22   Coty Estrada MD   carvedilol (COREG) 3.125 MG tablet TAKE 1 TAB BY MOUTH TWICE A DAY ( IN THE MORNING AND BEDTIME ) 1/21/22   Coty Estrada MD   Multiple Vitamins-Minerals (CERTAVITE/ANTIOXIDANTS) TABS TAKE 1 TABLET BY MOUTH ONCE DAILY 12/8/21   Coty Estrada MD   topiramate (TOPAMAX) 100 MG tablet TAKE 1 TABLET BY MOUTH NIGHTLY  10/25/21   Radha Reich MD   atorvastatin (LIPITOR) 40 MG tablet Take 1 tablet by mouth daily 2/28/21   Lianet Mustafa MD   albuterol (PROVENTIL) (2.5 MG/3ML) 0.083% nebulizer solution Take 3 mLs by nebulization every 6 hours as needed for Wheezing 11/3/20   Dominga Shafer MD   OXYGEN Inhale 3 L into the lungs     Historical Provider, MD       Current Medications:   No current facility-administered medications for this encounter. REVIEW OF SYSTEMS:       CONSTITUTIONAL: negative for fatigue and malaise   EYES: negative for double vision and photophobia    HEENT: negative for tinnitus and sore throat   RESPIRATORY: negative for cough, shortness of breath   CARDIOVASCULAR: negative for chest pain, palpitations   GASTROINTESTINAL: negative for nausea, vomiting   GENITOURINARY: negative for incontinence   MUSCULOSKELETAL: negative for neck or back pain   NEUROLOGICAL: negative for seizures   PSYCHIATRIC: negative for fatigue     Review of systems otherwise negative. PHYSICAL EXAM:       BP (!) 142/98   Pulse 77   Temp 98.2 °F (36.8 °C) (Oral)   Resp 18   Ht 5' 2\" (1.575 m)   Wt 220 lb (99.8 kg)   LMP  (LMP Unknown)   SpO2 93%   BMI 40.24 kg/m²     CONSTITUTIONAL:  Well developed, well nourished, alert and oriented x 3, in no acute distress. GCS 15, nontoxic. No dysarthria , no aphasia .    HEAD:  normocephalic, atraumatic    EYES:  PERRLA, EOMI.   ENT:  moist mucous membranes   NECK:  supple, symmetric, no midline tenderness to palpation    BACK:  without midline tenderness, step-offs or deformities    LUNGS:  Equal air entry bilaterally   CARDIOVASCULAR:  normal s1 / s2   ABDOMEN:  Soft, no rigidity   NEUROLOGIC:  Mental Status:  A & O x3,awake             Cranial Nerves:    cranial nerves II-XII are grossly intact    Motor Exam:    Drift:  absent  Tone:  normal    Motor exam is symmetrical 5 out of 5 all extremities bilaterally except right upper 4+/5    Sensory:    Touch:    Right Upper Extremity:  abnormal -    Left Upper Extremity:  normal  Right Lower Extremity: normal  Left Lower Extremity:  normal    Deep Tendon Reflexes:    Right Bicep:  2+  Left Bicep:  2+  Right Knee:  2+  Left Knee:  2+    Plantar Response:  Right:  downgoing  Left:  downgoing    Clonus:  absent  Hameed's:  absent    Coordination/Dysmetria:  Heel to Shin:  Right:  normal  Left:  normal  Finger to Nose:   Right:  normal  Left:  normal   Dysdiadochokinesia:  absent    Gait:  not assessed     INITIAL NIH STROKE SCALE:    Time Performed:  0656 AM     1a. Level of consciousness:  0 - alert; keenly responsive  1b. Level of consciousness questions:  0 - answers both questions correctly  1c. Level of consciousness questions:  0 - performs both tasks correctly  2. Best Gaze:  0 - normal  3. Visual:  0 - no visual loss  4. Facial Palsy:  0 - normal symmetric movement  5a. Motor left arm:  0 - no drift, limb holds 90 (or 45) degrees for full 10 seconds  5b. Motor right arm:  1 - drift, limb holds 90 (or 45) degrees but drifts down before full 10 seconds: does not hit bed  6a. Motor left le - no drift; leg holds 30 degree position for full 5 seconds  6b. Motor right le - no drift; leg holds 30 degree position for full 5 seconds  7. Limb Ataxia:  0 - absent  8. Sensory:  1 - mild to moderate sensory loss; patient feels pinprick is less sharp or is dull on the affected side; there is a loss of superficial pain with pinprick but patient is aware of being touched   9. Best Language:  0 - no aphasia, normal  10. Dysarthria:  0 - normal  11.   Extinction and Inattention:  0 - no abnormality    TOTAL:  2     SKIN:  no rash      Modified Ollie Score Scale:     [x] Zero: No symptoms at all   [] 1: No significant disability despite symptoms; able to carry out all usual duties and activities   [] 2: Slight disability; unable to carry out all previous activities, but able to look after own affairs without assistance   [] 3:Moderate disability; requiring some help, but able to walk without assistance   [] 4: Moderately severe disability; unable to walk and attend to bodily needs without assistance   [] 5:Severe disability; bedridden, incontinent and requiring constant nursing care and attention    LABS AND IMAGING:     CBC with Differential:    Lab Results   Component Value Date/Time    WBC 6.3 11/09/2022 07:05 AM    RBC 4.58 11/09/2022 07:05 AM    RBC 3.37 03/08/2012 06:13 AM    HGB 14.1 11/09/2022 07:05 AM    HCT 42.6 11/09/2022 07:05 AM     11/09/2022 07:05 AM     03/08/2012 06:13 AM    MCV 93.0 11/09/2022 07:05 AM    MCH 30.8 11/09/2022 07:05 AM    MCHC 33.1 11/09/2022 07:05 AM    RDW 12.7 11/09/2022 07:05 AM    LYMPHOPCT 26 11/09/2022 07:05 AM    MONOPCT 8 11/09/2022 07:05 AM    BASOPCT 1 11/09/2022 07:05 AM    MONOSABS 0.51 11/09/2022 07:05 AM    LYMPHSABS 1.61 11/09/2022 07:05 AM    EOSABS 0.28 11/09/2022 07:05 AM    BASOSABS 0.05 11/09/2022 07:05 AM    DIFFTYPE NOT REPORTED 02/01/2022 02:28 PM         BMP:    Lab Results   Component Value Date/Time     11/09/2022 07:05 AM    K 4.4 11/09/2022 07:05 AM    CL 98 11/09/2022 07:05 AM    CO2 26 11/09/2022 07:05 AM    BUN 18 11/09/2022 07:05 AM    LABALBU 3.3 11/01/2022 03:42 PM    LABALBU 3.5 11/22/2011 04:32 AM    CREATININE 0.99 11/09/2022 07:05 AM    CREATININE 0.95 11/09/2022 07:00 AM    CALCIUM 9.7 11/09/2022 07:05 AM    GFRAA >60 07/28/2022 05:55 AM    LABGLOM >60 11/09/2022 07:05 AM    GLUCOSE 311 11/09/2022 07:05 AM    GLUCOSE 123 03/08/2012 06:13 AM       Radiology Review:    1.) CT brain without contrast:     No acute intracranial abnormality.      Mild chronic microvascular disease periventricular    2.) MRA head and neck: 11/9/2022: Remote left P2 occlusion     3.) Brain MRI W/O:  NO acute stroke        ASSESSMENT AND PLAN:       Patient Active Problem List   Diagnosis    Chronic pain of left knee    Type 2 diabetes mellitus with diabetic polyneuropathy, with long-term current use of insulin (HCC)    COPD (chronic obstructive pulmonary disease)    Nonintractable migraine, unspecified migraine type    Coronary artery disease due to lipid rich plaque    DDD (degenerative disc disease), lumbar    Chronic, continuous use of opioids    DDD (degenerative disc disease), cervical    Osteoarthritis of cervical spine    GERD (gastroesophageal reflux disease)    Essential hypertension    Mixed hyperlipidemia    Insomnia    Lumbosacral spondylosis without myelopathy    Sacroiliitis, not elsewhere classified ([facility]    Carpal tunnel syndrome    Mixed stress and urge urinary incontinence    Intractable migraine with aura without status migrainosus    Anxiety and depression    Chronic migraine without aura without status migrainosus, not intractable    Morbid (severe) obesity with alveolar hypoventilation (HCC)    Chronic nausea    Lung nodule    Neuropathy    Obstructive sleep apnea syndrome    Asthma with COPD ([facility]    Obesity, Class III, BMI 40-49.9 (morbid obesity) ([facility]    Stage 3 chronic kidney disease (HCC)    Benign hypertension with CKD (chronic kidney disease) stage III (HCC)    Secondary diabetes mellitus with stage 3 chronic kidney disease (GFR 30-59) (HCC)    CKD (chronic kidney disease) stage 3, GFR 30-59 ml/min    Lumbar radiculopathy, chronic    Sessile colonic polyp    Morbid obesity (HCC)    Adenomatous polyp of ascending colon    Irritable bowel syndrome with both constipation and diarrhea    Abdominal bloating    Major depressive disorder, single episode, unspecified    Permanent atrial fibrillation (HCC)    Polyneuropathy, unspecified    Atypical chest pain    Left ventricular diastolic dysfunction    Transaminasemia    Acute respiratory failure with hypoxia and hypercarbia (HCC)    Macrocytosis    Morbid obesity with BMI of 40.0-44.9, adult (HCC)    Chronic diastolic congestive heart failure (HCC)    Oxygen dependent    Chronic bilateral low back pain with bilateral sciatica    Left lower lobe pneumonia    Prolonged Q-T interval on ECG    Chronic respiratory failure with hypoxia (HCC)    Pneumonia    Chest pain       Assessment                 66 y.o. female who presents with COPD, diabetes mellitus, migraine, hypertension, hyperlipidemia, chronic back pain presented to ER with chief complaint of right upper extremity tingling. Patient was previously seen in the ER on 11/8/2022 for right calf pain for which she underwent Doppler scan which was negative for DVT. CT head in the ER negative for any acute intracranial abnormality. Imaging   - CT Head  WO : done   - MRA head and neck   - MRI Brain WO :Done  - MRA head and neck: Done     Medications   -  Aspirin 325  mg daily x1  - Clopidogrel 300  md daily  x1  - Folic acid 1mg BID  - Atorvastatin 40 mg nightly   - Continue Plavix 75 daily       Labs  - Fasting Lipid panel  - HgbA1c lab             Out patient EMG of U/L vs B/l upper extremity      Patient can be discharged from Neurological side. Thank you for your consult.        Taras Suh MD   PGY 4 Neurology Resident  11/9/2022 at 10:51 AM

## 2022-11-09 NOTE — PROGRESS NOTES
[address] [facility] [address]     Emergency/Trauma Note    PATIENT NAME: Marcie Frye    Shift date: 11/9/2022  Shift day: Tuesday   Shift # 3    Room # 10/10   Name: Marcie Frye            Age: 66 y.o. Gender: female          Uatsdin: Yarsani   Place of Restorationism:     Trauma/Incident type: Stroke Alert  Admit Date & Time: 11/9/2022  6:35 AM  TRAUMA NAME:     ADVANCE DIRECTIVES IN CHART? No    NAME OF DECISION MAKER:     RELATIONSHIP OF DECISION MAKER TO PATIENT:     PATIENT/EVENT DESCRIPTION:  Marcie Frye is a 66 y.o. female who arrived via EMS as a Stroke Alert. Patient to be admitted to 10/10. SPIRITUAL ASSESSMENT-INTERVENTION-OUTCOME:  Suzanne Downs was ministry of presence.  prayed with patient. Patient hopeful cause for the \"tingling\" she is experiencing will be found. PATIENT BELONGINGS:  With patient    ANY BELONGINGS OF SIGNIFICANT VALUE NOTED:  None    REGISTRATION STAFF NOTIFIED? No      WHAT IS YOUR SPIRITUAL CARE PLAN FOR THIS PATIENT?:   [employer] are available 24/7 via [employer].     Electronically signed by Josesito Mariano on 11/9/2022 at 7:16 AM.  3 [facility]  [phone]

## 2022-11-09 NOTE — ED NOTES
Recd reported from [employer]. Reported pt was seen here yesterday for similar s/s \"except this morning her right arm was tingling\". Pt reported right leg pain shooting thru buttock to calf, pt requesting pain medications for this; Dr Patrice Pierce updated, awaiting orders. Pt otherwise Neurological intact. Pt reported PTA \"I was having trouble getting my words out but they did come out\". Neuro resident at bedside with pt in CT. Pt denied CP/SOB to writer. JOAQUÍN Wilson 115 2000 11-09-22.        Shweta Esteban RN  11/09/22 [id number]

## 2022-11-09 NOTE — ED NOTES
Dr Carol Beverly at bedside     Caro [place]an, [facility], [facility]  11/09/22 [id number]

## 2022-11-09 NOTE — ED PROVIDER NOTES
[facility]  ED  Emergency Department        Pt Name: Ariella Fuentes  MRN: [id number]  [place] 1950  Date of evaluation: 11/9/22    CHIEF COMPLAINT       Chief Complaint   Patient presents with    Tingling     Right arm    Other     Leg spasms since yesterday       HISTORY OF PRESENT ILLNESS  (Location/Symptom, Timing/Onset, Context/Setting, Quality, Duration, ModifyingFactors, Severity.)      Ariella Fuentes is a 66 y.o. female who presents with complaints of right calf spasming started yesterday has continued till today. Was evaluated yesterday in the ER. Had a lower extremity Doppler which was negative. And discharged home. Patient new symptom this morning describes some tingling to her right upper arm that started around 5 AM when patient last known well at 930 last night. Patient states that she woke up around 5 AM with heaviness to her right extremity and tingling. She does report she has had \"mini strokes in the past.  Which were noticed on imaging. Patient denies any symptoms.   Patient also chronically on home 02 3 L    PAST MEDICAL / SURGICAL / SOCIAL / FAMILY HISTORY      has a past medical history of Abdominal bloating, Adenomatous polyp of ascending colon, Allergic rhinitis, Anxiety, Arthritis, Asthma, Atrial fibrillation (HCC), Back pain, Benign hypertension with CKD (chronic kidney disease) stage III ([address] CAD (coronary artery disease), Caffeine use, CHF (congestive heart failure) ([address] Chronic kidney disease, CKD (chronic kidney disease) stage 3, GFR 30-59 ml/min ([facility]), COPD (chronic obstructive pulmonary disease) ([address] Degeneration of lumbar or lumbosacral intervertebral disc, Depression, DM (diabetes mellitus) ([address] Emphysema of lung ([address] Gastritis, GERD (gastroesophageal reflux disease), Hearing loss, Hematuria, Hiatal hernia, HTN (hypertension), benign, Hypertriglyceridemia, Incontinence of urine, Irritable bowel syndrome with both constipation and diarrhea, Knee arthropathy, Lumbosacral spondylosis without myelopathy, Migraine headache, Mumps, Neuropathy, Obesity, On home oxygen therapy, HIGINIO on CPAP, Otitis media, 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 has a past surgical history that includes Cholecystectomy (); Carpal tunnel release (Right); Tympanoplasty; Dilation & curettage (); Cystocopy (2013); Cataract removal with implant (Bilateral); Tonsillectomy; Incontinence surgery; ablation of dysrhythmic focus (); Upper gastrointestinal endoscopy (2016); eye surgery; Tubal ligation; other surgical history (09/10/15); Insertable Cardiac Monitor (2015); Tympanoplasty; Colonoscopy; Colonoscopy (04/10/2017); pr colsc flx w/removal lesion by hot bx forceps (N/A, 4/10/2017); Tympanostomy tube placement (2017); Upper gastrointestinal endoscopy (N/A, 3/2/2021); and Colonoscopy (N/A, 3/2/2021).        Social History     Socioeconomic History    Marital status: Legally      Spouse name: Not on file    Number of children: Not on file    Years of education: Not on file    Highest education level: Not on file   Occupational History    Occupation: disabled     Employer: N/A   Tobacco Use    Smoking status: Former     Packs/day: 3.00     Years: 41.00     Pack years: 123.00     Types: Cigarettes     Quit date: 2000     Years since quittin.8    Smokeless tobacco: Never    Tobacco comments:     quit in    Vaping Use    Vaping Use: Never used   Substance and Sexual Activity    Alcohol use: No     Alcohol/week: 0.0 standard drinks    Drug use: No    Sexual activity: Not Currently   Other Topics Concern    Not on file   Social History Narrative Not on file     Social Determinants of Health     Financial Resource Strain: Low Risk     Difficulty of Paying Living Expenses: Not hard at all   Food Insecurity: No Food Insecurity    Worried About Running Out of Food in the Last Year: Never true    Ran Out of Food in the Last Year: Never true   Transportation Needs: Not on file   Physical Activity: Not on file   Stress: Not on file   Social Connections: Not on file   Intimate Partner Violence: Not on file   Housing Stability: Not on file       Family History   Problem Relation Age of Onset    Diabetes Mother     Heart Disease Mother     Stomach Cancer Father     Diabetes Maternal Grandmother         also aunts and uncles (maternal)    Emphysema Sister        Allergies:  Robitussin [guaifenesin], Codeine, Compazine [prochlorperazine maleate], Iodides, Morphine, Moxifloxacin, Reglan [metoclopramide], Avelox [moxifloxacin hcl in nacl], Cipro xr, Sulfa antibiotics, and Zofran [ondansetron hcl]    Home Medications:  Prior to Admission medications    Medication Sig Start Date End Date Taking?  Authorizing Provider   lidocaine 4 % external patch Place 1 patch onto the skin daily for 10 days 11/8/22 11/18/22  Assumcar Ramos MD   cyclobenzaprine (FLEXERIL) 5 MG tablet Take 1 tablet by mouth 2 times daily as needed for Muscle spasms 11/8/22 11/18/22  Adrianne Watters MD   TRULICITY 1.5 VG/4.2CP SC injection INJECT ONE AND A HALF (1 & 1/2) MG INTO THE SKIN ONCE A WEEK STOP 0.75 10/31/22   VERONIKA Recinos CNP   metFORMIN (GLUCOPHAGE) 1000 MG tablet Take 1 tablet by mouth 2 times daily (with meals) 10/21/22 11/20/22  VERONIKA Craig CNP   escitalopram (LEXAPRO) 10 MG tablet Take 1 tablet by mouth daily 10/13/22 1/11/23  VERONIKA Recinos CNP   furosemide (LASIX) 20 MG tablet TAKE 1 TABLET BY MOUTH ONCE DAILY AS NEEDED 10/13/22   VERONIKA Recinos CNP   oxyCODONE-acetaminophen (PERCOCET) 5-325 MG per tablet Take 1 tablet by mouth every 8 hours as needed for Pain for up to 30 days. Intended supply: 30 days. 10/15/22 11/14/22  VERONIKA Amanda CNP   oxybutynin (DITROPAN-XL) 5 MG extended release tablet TAKE 1 TABLET BY MOUTH DAILY 9/26/22   Susana Duty, VERONIKA Dalton CNP   budesonide-formoterol (SYMBICORT) 160-4.5 MCG/ACT AERO Inhale 2 puffs into the lungs in the morning and 2 puffs before bedtime. As needed for sob. 7/27/22   Destin Srivastava MD   blood glucose test strips (TRUE METRIX BLOOD GLUCOSE TEST) strip THREE TIMES A DAY 7/27/22   Destin Srivastava MD   blood glucose test strips (ONETOUCH ULTRA) strip TEST TWO (2) TO THREE (3) TIMES A DAY & AS NEEDED FOR SYMPTOMS OF IRREGULAR BLOOD GLUCOSE 7/27/22   Destin Srivastava MD   blood glucose test strips (ONETOUCH ULTRA) strip As needed.  7/27/22   Destin Srivastava MD   vitamin B-12 (CYANOCOBALAMIN) 100 MCG tablet take half a tablet BY MOUTH ONCE DAILY 7/27/22   Destin Srivastava MD   Melatonin 10 MG TABS Take 10 mg by mouth nightly 7/27/22   Destin Srivastava MD   dicyclomine (BENTYL) 10 MG capsule TAKE 1 CAPSULE BY MOUTH TWICE A DAY AS NEEDED FOR ABDOMINAL SPASMS 7/27/22   Destin Srivastava MD    MG capsule TAKE 1 CAPSULE BY MOUTH TWICE A DAY 7/18/22   Susana DutyVERONIKA CNP   SM ASPIRIN ADULT LOW STRENGTH 81 MG EC tablet TAKE 1 TABLET BY MOUTH ONCE DAILY 7/18/22   Susana Duty, VERONIKA Dalton CNP   pantoprazole (PROTONIX) 40 MG tablet TAKE 1 TABLET BY MOUTH TWICE A DAY 7/18/22   Susana DutyVERONIKA CNP   isosorbide dinitrate (ISORDIL) 30 MG tablet TAKE 1 TABLET BY MOUTH ONCE DAILY 7/18/22   Susana Duty, VERONIKA Dalton CNP   FEROSUL 325 (65 Fe) MG tablet TAKE 1 TAB BY MOUTH IN THE MORNING 6/20/22   VERONIKA Snowden CNP   clopidogrel (PLAVIX) 75 MG tablet TAKE 1 TAB BY MOUTH ONCE A DAY ( IN THE MORNING ) -THIS MEDICINE MAY BE TAKENWITH OR WITHOUT FOOD 5/25/22   Jacqueline Cheung, APRN - CNP   fenofibrate (TRIGLIDE) 160 MG tablet TAKE 1 TABLET BY MOUTH DAILY 3/30/22   Se Eli MD   insulin aspart (NOVOLOG FLEXPEN) 100 UNIT/ML injection pen IF<139 NO INSULIN; 140-199-2 UN;200-249-4 UN;250-299-6 DI;676-947-3 UN;350-400=10 UN;ABOVE 400-12 UNIT(S) 3/30/22   Shaka Gaxiola MD   magnesium oxide (MAG-OX) 400 MG tablet  2/21/22   Historical Provider, MD   insulin glargine (BASAGLAR KWIKPEN) 100 UNIT/ML injection pen Inject 55 Units into the skin 2 times daily 3/1/22   Shaka Gaxiola MD   losartan (COZAAR) 25 MG tablet TAKE 1 TABLET BY MOUTH ONCE DAILY 2/21/22   Shaka Gaxiola MD   carvedilol (COREG) 3.125 MG tablet TAKE 1 TAB BY MOUTH TWICE A DAY ( IN THE MORNING AND BEDTIME ) 1/21/22   Shaka Gaxiola MD   Multiple Vitamins-Minerals (CERTAVITE/ANTIOXIDANTS) TABS TAKE 1 TABLET BY MOUTH ONCE DAILY 12/8/21   Shaka Gaxiola MD   topiramate (TOPAMAX) 100 MG tablet TAKE 1 TABLET BY MOUTH NIGHTLY  10/25/21   Matt Wheat MD   atorvastatin (LIPITOR) 40 MG tablet Take 1 tablet by mouth daily 2/28/21   Shaka Gaxiola MD   albuterol (PROVENTIL) (2.5 MG/3ML) 0.083% nebulizer solution Take 3 mLs by nebulization every 6 hours as needed for Wheezing 11/3/20   Sharon Green MD   OXYGEN Inhale 3 L into the lungs     Historical Provider, MD       REVIEW OF SYSTEMS    (2-9 systems for level 4, 10 or more for level 5)      Review of Systems   Constitutional:  Negative for activity change, appetite change, fatigue and fever. HENT:  Negative for congestion, rhinorrhea and sore throat. Respiratory:  Negative for cough, shortness of breath and wheezing. Cardiovascular:  Negative for chest pain, palpitations and leg swelling. Gastrointestinal:  Negative for abdominal distention, constipation, diarrhea, nausea and vomiting. Genitourinary:  Negative for decreased urine volume and dysuria. Musculoskeletal:  Positive for arthralgias. Skin:  Negative for rash. Neurological:  Positive for weakness and numbness. Negative for dizziness, light-headedness and headaches.      PHYSICAL EXAM   (up to 7 for level 4, 8 or more for level 5)     INITIAL VITALS:   BP (!) 142/98   Pulse 77   Temp 98.2 °F (36.8 °C) (Oral)   Resp 18   Ht 5' 2\" (1.575 m)   Wt 220 lb (99.8 kg)   LMP  (LMP Unknown)   SpO2 93%   BMI 40.24 kg/m²     Physical Exam  Constitutional:       General: She is not in acute distress. Appearance: Normal appearance. She is not ill-appearing. Comments: No facial droop or speech change noted   HENT:      Head: Normocephalic and atraumatic. Eyes:      General:         Right eye: No discharge. Left eye: No discharge. Extraocular Movements: Extraocular movements intact. Pupils: Pupils are equal, round, and reactive to light. Cardiovascular:      Rate and Rhythm: Normal rate. Pulmonary:      Effort: Pulmonary effort is normal. No respiratory distress. Musculoskeletal:      Right lower leg: No edema. Left lower leg: No edema. Comments: Tenderness to palpation to the right calf, on exam when lifting right leg off gurney patient's calf musc contracts and patient in significant pain, able to relax with extension and massage. Neurological:      Mental Status: She is alert and oriented to person, place, and time. Comments: Decreased sensation to the RUE, slight weakness noted on exam 4/5  Motor strength in all other extremities is 5/5       DIFFERENTIAL  DIAGNOSIS     Patient with cramps to her right leg, as noted on exam ,and pain to that leg will check ck/myoglbin, and pain control, IVF,laso now weakness and numbness to her RUE, noted on exam, she is outside the window for TPA, stroke alert non critcal called, and patient to get head CT. Will review chart from yesterday. NIH Stroke Scale  Interval: Baseline  Level of Consciousness (1a): Alert  LOC Questions (1b): Answers both correctly  LOC Commands (1c): Performs both tasks correctly  Best Gaze (2): Normal  Visual (3): No visual loss  Facial Palsy (4): Normal symmetrical movement  Motor Arm, Left (5a):  No drift  Motor Arm, Right (5b): No drift  Motor Leg, Left (6a): No drift  Motor Leg, Right (6b):  No drift  Limb Ataxia (7): Absent  Sensory (8): (!) Mild to Moderate  Best Language (9): No aphasia  Dysarthria (10): Normal  Extinction and Inattention (11): No abnormality  Total: 1    K[place] Coma Scale  Eye Opening: Spontaneous  Best Verbal Response: Oriented  Best Motor Response: Obeys commands  [place] Coma Scale Score: 15                PLAN (LABS / IMAGING / EKG):  Orders Placed This Encounter   Procedures    CT HEAD WO CONTRAST    MRI BRAIN WO CONTRAST    MRA HEAD WO CONTRAST    STROKE PANEL    ELECTROLYTES PLUS    Hemoglobin and hematocrit, blood    CALCIUM, IONIC (POC)    Ambulate patient    Inpatient consult to Stroke Team    Venous Blood Gas, POC    Creatinine W/GFR Point of Care    POCT urea (BUN)    Lactic Acid, POC    POCT Glucose       MEDICATIONS ORDERED:  Orders Placed This Encounter   Medications    aspirin tablet 325 mg    clopidogrel (PLAVIX) tablet 300 mg    orphenadrine (NORFLEX) injection 60 mg       DIAGNOSTIC RESULTS / EMERGENCY DEPARTMENT COURSE / MDM     LABS:  Results for orders placed or performed during the hospital encounter of 11/09/22   STROKE PANEL   Result Value Ref Range    Glucose 311 (H) 70 - 99 mg/dL    BUN 18 8 - 23 mg/dL    Creatinine 0.99 (H) 0.50 - 0.90 mg/dL    Est, Glom Filt Rate >60 >60 mL/min/1.73m2    Calcium 9.7 8.6 - 10.4 mg/dL    Sodium 137 135 - 144 mmol/L    Potassium 4.4 3.7 - 5.3 mmol/L    Chloride 98 98 - 107 mmol/L    CO2 26 20 - 31 mmol/L    Anion Gap 13 9 - 17 mmol/L    WBC 6.3 3.5 - 11.3 k/uL    RBC 4.58 3.95 - 5.11 m/uL    Hemoglobin 14.1 11.9 - 15.1 g/dL    Hematocrit 42.6 36.3 - 47.1 %    MCV 93.0 82.6 - 102.9 fL    MCH 30.8 25.2 - 33.5 pg    MCHC 33.1 28.4 - 34.8 g/dL    RDW 12.7 11.8 - 14.4 %    Platelets 174 580 - 353 k/uL    MPV 11.6 8.1 - 13.5 fL    NRBC Automated 0.0 0.0 per 100 WBC    Total CK 81 26 - 192 U/L    Seg Neutrophils 60 36 - 65 % Lymphocytes 26 24 - 43 %    Monocytes 8 3 - 12 %    Eosinophils % 5 (H) 1 - 4 %    Basophils 1 0 - 2 %    Immature Granulocytes 0 0 %    Segs Absolute 3.79 1.50 - 8.10 k/uL    Absolute Lymph # 1.61 1.10 - 3.70 k/uL    Absolute Mono # 0.51 0.10 - 1.20 k/uL    Absolute Eos # 0.28 0.00 - 0.44 k/uL    Basophils Absolute 0.05 0.00 - 0.20 k/uL    Absolute Immature Granulocyte <0.03 0.00 - 0.30 k/uL    Myoglobin 61 (H) 25 - 58 ng/mL    Protime 10.6 9.1 - 12.3 sec    INR 1.0     PTT 21.4 20.5 - 30.5 sec    Troponin, High Sensitivity 12 0 - 14 ng/L   ELECTROLYTES PLUS   Result Value Ref Range    POC Sodium 140 138 - 146 mmol/L    POC Potassium 4.3 3.5 - 4.5 mmol/L    POC Chloride 101 98 - 107 mmol/L    POC TCO2 30 22 - 30 mmol/L    Anion Gap 10 7 - 16 mmol/L   Hemoglobin and hematocrit, blood   Result Value Ref Range    POC Hemoglobin 16.8 (H) 12.0 - 16.0 g/dL    POC Hematocrit 50 (H) 36 - 46 %   CALCIUM, IONIC (POC)   Result Value Ref Range    POC Ionized Calcium 1.26 1.15 - 1.33 mmol/L   Venous Blood Gas, POC   Result Value Ref Range    pH, Onel 7.313 (L) 7.320 - 7.430    pCO2, Onel 58.4 (H) 41.0 - 51.0 mm Hg    pO2, Onel 44.6 30.0 - 50.0 mm Hg    HCO3, Venous 29.6 (H) 22.0 - 29.0 mmol/L    Positive Base Excess, Onel 2 0.0 - 3.0    O2 Sat, Onel 75 60.0 - 85.0 %   Creatinine W/GFR Point of Care   Result Value Ref Range    POC Creatinine 0.95 0.51 - 1.19 mg/dL    eGFR, POC >60 mL/min/1.73m2   POCT urea (BUN)   Result Value Ref Range    POC BUN 20 8 - 26 mg/dL   Lactic Acid, POC   Result Value Ref Range    POC Lactic Acid 0.88 0.56 - 1.39 mmol/L   POCT Glucose   Result Value Ref Range    POC Glucose 299 (H) 74 - 100 mg/dL       IMPRESSION: Leg cramping      RADIOLOGY:  MRI BRAIN WO CONTRAST   Final Result   No acute infarct or other acute intracranial process. 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 MRA HEAD WO CONTRAST   Final Result   No acute infarct or other acute intracranial process.       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 CT HEAD WO CONTRAST   Final Result   No acute intracranial abnormality. Mild chronic microvascular disease periventricular. EMERGENCY DEPARTMENT COURSE:  Patient seen by Neuro and plan for MRI, and patient taken to MRI at this time 9:42 AM EST  Her leg is consistent with cramping/spasm, so norflex was provided. Patient unable to ambulate due to pain in her right leg. From cramping. Unable to go home as she lives by herself patient to be placed in observation unit for further physical therapy and occupational health evaluation. Spoke with neurology, MRI did not show any acute stroke. And symptoms consistent to her right upper extremity with carpal tunnel and patient has a history of carpal tunnel on that side status post release. CONSULTS:  IP CONSULT TO STROKE TEAM        FINAL IMPRESSION      1. Leg cramping    2. RUE numbness          DISPOSITION / PLAN     DISPOSITION        PATIENT REFERRED TO:  No follow-up provider specified.     DISCHARGE MEDICATIONS:  New Prescriptions    No medications on file       Shawn Mejía DO  12:02 PM    Attending Emergency Physician  [facility]    (Please note that portions of this note were completed with a voice recognition program.  Rina Jules made to edit the dictations but occasionally words are mis-transcribed.)             Filipe Gale, DO  11/09/22 1201

## 2022-11-09 NOTE — ED NOTES
Pt requested more for \"my leg pain\", Dr Mando Sexton updated. Pt requested \"something to eat\", Dr Mando Sexton updated and per verbal order box lunch provided.       Kaitlin Bennett RN  11/09/22 2696

## 2022-11-09 NOTE — ACP (ADVANCE CARE PLANNING)
Advance Care Planning     Advance Care Planning Activator (Inpatient)  Conversation Note      Date of ACP Conversation: 11/9/2022     Conversation Conducted with: Patient with Decision Making Capacity    ACP Activator: Dom Carver, [address] Decision Maker:     Current Designated Health Care Decision Maker:     Primary Decision Maker: Aden Kirkland *\[facility] [place] [facility]\"" - Child - [phone]  Click here to complete Healthcare Decision Makers including section of the Healthcare Decision Maker Relationship (ie \"Primary\")  Today we documented Decision Maker(s) consistent with Legal Next of Kin hierarchy. Care Preferences    Ventilation: \"If you were in your present state of health and suddenly became very ill and were unable to breathe on your own, what would your preference be about the use of a ventilator (breathing machine) if it were available to you? \"      Would the patient desire the use of ventilator (breathing machine)?: yes    \"If your health worsens and it becomes clear that your chance of recovery is unlikely, what would your preference be about the use of a ventilator (breathing machine) if it were available to you? \"     Would the patient desire the use of ventilator (breathing machine)?: Yes      Resuscitation  \"CPR works best to restart the heart when there is a sudden event, like a heart attack, in someone who is otherwise healthy. Unfortunately, CPR does not typically restart the heart for people who have serious health conditions or who are very sick. \"    \"In the event your heart stopped as a result of an underlying serious health condition, would you want attempts to be made to restart your heart (answer \"yes\" for attempt to resuscitate) or would you prefer a natural death (answer \"no\" for do not attempt to resuscitate)? \" yes       [] Yes   [x] No   Educated Patient / Marquez Row regarding differences between Advance Directives and portable DNR orders.     Length of ACP Conversation in minutes:      Conversation Outcomes:  [x] ACP discussion completed  [] Existing advance directive reviewed with patient; no changes to patient's previously recorded wishes  [] New Advance Directive completed  [] Portable Do Not Rescitate prepared for Provider review and signature  [] POLST/POST/MOLST/MOST prepared for Provider review and signature      Follow-up plan:    [] Schedule follow-up conversation to continue planning  [] Referred individual to Provider for additional questions/concerns   [] Advised patient/agent/surrogate to review completed ACP document and update if needed with changes in condition, patient preferences or care setting    [] This note routed to one or more involved healthcare providers

## 2022-11-09 NOTE — ED NOTES
Pt to ED via [address] d/t Leg spasms since yesterday and Right arm tingling sensation started earlier at around 0500H when the pt woke up from sleep. Pt states she was here yesterday afternoon complaining of leg spasm but got discharged. Pt is on O2 via NC at 2LPM. Pt states she has a history of HTN, COPD. Placed pt on full cardiac monitor. Dr Chencho King at bedside for consult. Will continue plan of care.      Suhas Zavala RN  11/09/22 1131

## 2022-11-09 NOTE — CONSULTS
Department of Endovascular Neurosurgery                                                                                                                      Resident Consult Note  Stroke Alert paged @ 0873  ER Room # 10  Arrival to patient bedside @ 9422        Reason for Consult:   right sided   Requesting Physician:   ER   Endovascular Neurosurgeon:   []Dr. Nevin Howe  []Dr. Jarad Escalona  []Dr. Joel White   []    History Obtained From:  patient    CHIEF COMPLAINT:       Right sided tinling    HISTORY OF PRESENT ILLNESS:       The patient is a 66 y.o. female who presents with right upper arm tingling sensation. Last know well: 9:30 PM on 11/8/2022.    14-year-old female with past medical history of COPD, diabetes, migraine, headache, CAD, hypertension, A. fib s/p radiofrequency 2001, presented with right arm tingling sensation started 5:30 AM this morning  Patient was seen in the ER on 9/8/2022 when she presented with right calf spasming, she was evaluated in the ER, lower extremity Doppler was done which was negative and patient was discharged home. On presentation:  BP:139/102  BSL:453  NIH 2  MRI : no acute infract   MRA head and neck :  Remote left p2 occlusion     Patient was loaded with aspirin 325 and [id number]  Prior to arrival patient was on  Antiplatelets/anticoagulants:  Patient is not sure what she is taking she was suppose to be on Plavix   Statins: Lipitor 40 mg       Smoking history: former smoker, quit 20 years ago       PAST MEDICAL HISTORY :       Past Medical History:        Diagnosis Date    Abdominal bloating 1/26/2021    Adenomatous polyp of ascending colon 1/26/2021    Allergic rhinitis     Anxiety 7/17/2013    Arthritis     Asthma     Atrial fibrillation (HCC)     Back pain     NERVE/DR. GRACIA    Benign hypertension with CKD (chronic kidney disease) stage III (HCC)     CAD (coronary artery disease) 3/21/2013    Caffeine use     2 coffee/day    CHF (congestive heart failure) ([facility])     Chronic kidney disease     CKD (chronic kidney disease) stage 3, GFR 30-59 ml/min ([facility])     COPD (chronic obstructive pulmonary disease) ([facility])     emphysema    Degeneration of lumbar or lumbosacral intervertebral disc     Depression     DM (diabetes mellitus) ([facility] [address]     Emphysema of lung ([facility])     Gastritis     GERD (gastroesophageal reflux disease)     Hearing loss     Hematuria     Hiatal hernia     HTN (hypertension), benign 6/28/2014    Hypertriglyceridemia     Incontinence of urine 9/25/2013    Irritable bowel syndrome with both constipation and diarrhea 1/26/2021    Knee arthropathy     Lumbosacral spondylosis without myelopathy 9/29/2014    Migraine headache     DR. BASIL [place]     Neuropathy     Obesity     On home oxygen therapy     2 L PER NC  HS AND PRN    HIGINIO on CPAP     Otitis media 1-26-15    Secondary diabetes mellitus with stage 3 chronic kidney disease (GFR 30-59) ([facility])     Stroke ([facility])      mini stroke. Tinnitus     Type 2 diabetes mellitus without complication ([facility])     Type II or unspecified type diabetes mellitus without mention of complication, not stated as uncontrolled     UTI (lower urinary tract infection)     Varicose vein        Past Surgical History:        Procedure Laterality Date    ABLATION OF DYSRHYTHMIC FOCUS  2000    CARPAL TUNNEL RELEASE Right     CATARACT REMOVAL WITH IMPLANT Bilateral     CHOLECYSTECTOMY  2007    COLONOSCOPY      COLONOSCOPY  04/10/2017    tubular adenomo polyp , mod. sigmoid diverticulosis, min. int. hemorrhoids    COLONOSCOPY N/A 3/2/2021    COLONOSCOPY WITH BIOPSY performed by Herlinda Sheriff MD at [address] [place] [address]  9/25/2013    DILATION AND CURETTAGE  1979    EYE SURGERY      laser    INCONTINENCE SURGERY      Percutaneous nerve stimulation Dr Virgie Amaya 2013     INSERTABLE CARDIAC MONITOR  12/04/2015    [employer] REVEAL LINQ MODEL #[id number] MRI CONDTIONAL OK 3T. IMMEDIATELY POST IMPLANT    OTHER SURGICAL HISTORY  09/10/15    removal of interstim    [place] COLSC FLX W/REMOVAL LESION BY HOT BX FORCEPS N/A 4/10/2017    COLONOSCOPY POLYPECTOMY HOT BIOPSY performed by Noelle House MD at [address] [place] [address]      TYMPANOSTOMY TUBE PLACEMENT  2017    UPPER GASTROINTESTINAL ENDOSCOPY  2016    Dr Tyree Robles N/A 3/2/2021    EGD BIOPSY performed by Constance Bird MD at [address] [facility] History:   Social History     Socioeconomic History    Marital status: Legally      Spouse name: Not on file    Number of children: Not on file    Years of education: Not on file    Highest education level: Not on file   Occupational History    Occupation: disabled     Employer: N/A   Tobacco Use    Smoking status: Former     Packs/day: 3.00     Years: 41.00     Pack years: 123.00     Types: Cigarettes     Quit date: 2000     Years since quittin.8    Smokeless tobacco: Never    Tobacco comments:     quit in    Vaping Use    Vaping Use: Never used   Substance and Sexual Activity    Alcohol use: No     Alcohol/week: 0.0 standard drinks    Drug use: No    Sexual activity: Not Currently   Other Topics Concern    Not on file   Social History Narrative    Not on file     Social Determinants of Health     Financial Resource Strain: Low Risk     Difficulty of Paying Living Expenses: Not hard at all   Food Insecurity: No Food Insecurity    Worried About Running Out of Food in the Last Year: Never true    Ran Out of Food in the Last Year: Never true   Transportation Needs: Not on file   Physical Activity: Not on file   Stress: Not on file   Social Connections: Not on file   Intimate Partner Violence: Not on file   Housing Stability: Not on file       Family History:       Problem Relation Age of Onset    Diabetes Mother     Heart Disease Mother     Stomach Cancer Father     Diabetes Maternal Grandmother         also aunts and uncles (maternal)    Emphysema Sister        Allergies:  Robitussin [guaifenesin], Codeine, Compazine [prochlorperazine maleate], Iodides, Morphine, Moxifloxacin, Reglan [metoclopramide], Avelox [moxifloxacin hcl in nacl], Cipro xr, Sulfa antibiotics, and Zofran [ondansetron hcl]    Home Medications:  Prior to Admission medications    Medication Sig Start Date End Date Taking? Authorizing Provider   lidocaine 4 % external patch Place 1 patch onto the skin daily for 10 days 11/8/22 11/18/22  Adrianne Beaulieu MD   cyclobenzaprine (FLEXERIL) 5 MG tablet Take 1 tablet by mouth 2 times daily as needed for Muscle spasms 11/8/22 11/18/22  Adrianne Caldwell MD   TRULICITY 1.5 FERNANDEZ/6.0BH SC injection INJECT ONE AND A HALF (1 & 1/2) MG INTO THE SKIN ONCE A WEEK STOP 0.75 10/31/22   VERONIKA Meyer CNP   metFORMIN (GLUCOPHAGE) 1000 MG tablet Take 1 tablet by mouth 2 times daily (with meals) 10/21/22 11/20/22  VERONIKA Lim CNP   escitalopram (LEXAPRO) 10 MG tablet Take 1 tablet by mouth daily 10/13/22 1/11/23  VERONIKA Meyer CNP   furosemide (LASIX) 20 MG tablet TAKE 1 TABLET BY MOUTH ONCE DAILY AS NEEDED 10/13/22   VERONIKA Meyer CNP   oxyCODONE-acetaminophen (PERCOCET) 5-325 MG per tablet Take 1 tablet by mouth every 8 hours as needed for Pain for up to 30 days. Intended supply: 30 days. 10/15/22 11/14/22  Arva Boeck Vriezelaar, APRN - CNP   oxybutynin (DITROPAN-XL) 5 MG extended release tablet TAKE 1 TABLET BY MOUTH DAILY 9/26/22   VERONIKA Meyer CNP   budesonide-formoterol (SYMBICORT) 160-4.5 MCG/ACT AERO Inhale 2 puffs into the lungs in the morning and 2 puffs before bedtime.  As needed for sob. 7/27/22   Jeanie Solitario MD   blood glucose test strips (TRUE METRIX BLOOD GLUCOSE TEST) strip THREE TIMES A DAY 7/27/22   Jeanie Solitario MD   blood glucose test strips (ONETOUCH ULTRA) strip TEST TWO (2) TO THREE (3) TIMES A DAY & AS NEEDED FOR SYMPTOMS OF IRREGULAR BLOOD GLUCOSE 7/27/22   Caitlin Epstein MD   blood glucose test strips (ONETOUCH ULTRA) strip As needed.  7/27/22   Caitlin Epstein MD   vitamin B-12 (CYANOCOBALAMIN) 100 MCG tablet take half a tablet BY MOUTH ONCE DAILY 7/27/22   Caitlin Epstein MD   Melatonin 10 MG TABS Take 10 mg by mouth nightly 7/27/22   Caitlin Epstein MD   dicyclomine (BENTYL) 10 MG capsule TAKE 1 CAPSULE BY MOUTH TWICE A DAY AS NEEDED FOR ABDOMINAL SPASMS 7/27/22   Caitlin Epstein MD    MG capsule TAKE 1 CAPSULE BY MOUTH TWICE A DAY 7/18/22   VERONIKA Araujo CNP   SM ASPIRIN ADULT LOW STRENGTH 81 MG EC tablet TAKE 1 TABLET BY MOUTH ONCE DAILY 7/18/22   VERONIKA Araujo CNP   pantoprazole (PROTONIX) 40 MG tablet TAKE 1 TABLET BY MOUTH TWICE A DAY 7/18/22   VERONIKA Araujo CNP   isosorbide dinitrate (ISORDIL) 30 MG tablet TAKE 1 TABLET BY MOUTH ONCE DAILY 7/18/22   VERONIKA Araujo CNP   FEROSUL 325 (65 Fe) MG tablet TAKE 1 TAB BY MOUTH IN THE MORNING 6/20/22   VERNOIKA Snowden CNP   clopidogrel (PLAVIX) 75 MG tablet TAKE 1 TAB BY MOUTH ONCE A DAY ( IN THE MORNING ) -THIS MEDICINE MAY BE TAKENWITH OR WITHOUT FOOD 5/25/22   VERONIKA Snowden CNP   fenofibrate (TRIGLIDE) 160 MG tablet TAKE 1 TABLET BY MOUTH DAILY 3/30/22   Nabila Smallwood MD   insulin aspart (NOVOLOG FLEXPEN) 100 UNIT/ML injection pen IF<139 NO INSULIN; 140-199-2 UN;200-249-4 UN;250-299-6 FR;456-689-9 UN;350-400=10 UN;ABOVE 400-12 UNIT(S) 3/30/22   Nabila Smallwood MD   magnesium oxide (MAG-OX) 400 MG tablet  2/21/22   Nick Mcknight MD   insulin glargine (BASAGLAR KWIKPEN) 100 UNIT/ML injection pen Inject 55 Units into the skin 2 times daily 3/1/22   Nabila Smallwood MD   losartan (COZAAR) 25 MG tablet TAKE 1 TABLET BY MOUTH ONCE DAILY 2/21/22   Nabila Smallwood MD   carvedilol (COREG) 3.125 MG tablet TAKE 1 TAB BY MOUTH TWICE A DAY ( IN THE MORNING AND BEDTIME ) 1/21/22   Nabila Smallwood MD   Multiple Vitamins-Minerals (CERTAVITE/ANTIOXIDANTS) TABS TAKE 1 TABLET BY MOUTH ONCE DAILY 12/8/21   Se Eli MD   topiramate (TOPAMAX) 100 MG tablet TAKE 1 TABLET BY MOUTH NIGHTLY  10/25/21   Duane Ruiz MD   atorvastatin (LIPITOR) 40 MG tablet Take 1 tablet by mouth daily 2/28/21   Se Eli MD   albuterol (PROVENTIL) (2.5 MG/3ML) 0.083% nebulizer solution Take 3 mLs by nebulization every 6 hours as needed for Wheezing 11/3/20   Gurmeet Tidwell MD   OXYGEN Inhale 3 L into the lungs     Historical Provider, MD       Current Medications:   No current facility-administered medications for this encounter. REVIEW OF SYSTEMS:       CONSTITUTIONAL: negative for fatigue and malaise   EYES: negative for double vision and photophobia    HEENT: negative for tinnitus and sore throat   RESPIRATORY: negative for cough, shortness of breath   CARDIOVASCULAR: negative for chest pain, palpitations   GASTROINTESTINAL: negative for nausea, vomiting   GENITOURINARY: negative for incontinence   MUSCULOSKELETAL: negative for neck or back pain   NEUROLOGICAL: negative for seizures   PSYCHIATRIC: negative for fatigue     Review of systems otherwise negative. PHYSICAL EXAM:       BP (!) 139/109   Pulse 78   Temp 98.2 °F (36.8 °C) (Oral)   Resp 21   Ht 5' 2\" (1.575 m)   Wt 220 lb (99.8 kg)   LMP  (LMP Unknown)   SpO2 94%   BMI 40.24 kg/m²     CONSTITUTIONAL:  Well developed, well nourished, alert and oriented x 3, in no acute distress. GCS 15, nontoxic. No dysarthria , no aphasia .    HEAD:  normocephalic, atraumatic    EYES:  PERRLA, EOMI.   ENT:  moist mucous membranes   NECK:  supple, symmetric, no midline tenderness to palpation    BACK:  without midline tenderness, step-offs or deformities    LUNGS:  Equal air entry bilaterally   CARDIOVASCULAR:  normal s1 / s2   ABDOMEN:  Soft, no rigidity   NEUROLOGIC:  Mental Status:  A & O x3,awake             Cranial Nerves:    cranial nerves II-XII are grossly intact    Motor Exam:    Drift:  absent  Tone:  normal    Motor exam is symmetrical 5 out of 5 all extremities bilaterally except right upper 4+/5    Sensory:    Touch:    Right Upper Extremity:  abnormal -    Left Upper Extremity:  normal  Right Lower Extremity:  normal  Left Lower Extremity:  normal    Deep Tendon Reflexes:    Right Bicep:  2+  Left Bicep:  2+  Right Knee:  2+  Left Knee:  2+    Plantar Response:  Right:  downgoing  Left:  downgoing    Clonus:  absent  Hameed's:  absent    Coordination/Dysmetria:  Heel to Shin:  Right:  normal  Left:  normal  Finger to Nose:   Right:  normal  Left:  normal   Dysdiadochokinesia:  absent    Gait:  not assessed     INITIAL NIH STROKE SCALE:    Time Performed:  0656 AM     1a. Level of consciousness:  0 - alert; keenly responsive  1b. Level of consciousness questions:  0 - answers both questions correctly  1c. Level of consciousness questions:  0 - performs both tasks correctly  2. Best Gaze:  0 - normal  3. Visual:  0 - no visual loss  4. Facial Palsy:  0 - normal symmetric movement  5a. Motor left arm:  0 - no drift, limb holds 90 (or 45) degrees for full 10 seconds  5b. Motor right arm:  1 - drift, limb holds 90 (or 45) degrees but drifts down before full 10 seconds: does not hit bed  6a. Motor left le - no drift; leg holds 30 degree position for full 5 seconds  6b. Motor right le - no drift; leg holds 30 degree position for full 5 seconds  7. Limb Ataxia:  0 - absent  8. Sensory:  1 - mild to moderate sensory loss; patient feels pinprick is less sharp or is dull on the affected side; there is a loss of superficial pain with pinprick but patient is aware of being touched   9. Best Language:  0 - no aphasia, normal  10. Dysarthria:  0 - normal  11.   Extinction and Inattention:  0 - no abnormality    TOTAL:  2     SKIN:  no rash      Modified Guilford Score Scale:     [x] Zero: No symptoms at all   [] 1: No significant disability despite symptoms; able to carry out all usual duties and activities   [] 2: Slight disability; unable to carry out all previous activities, but able to look after own affairs without assistance   [] 3:Moderate disability; requiring some help, but able to walk without assistance   [] 4: Moderately severe disability; unable to walk and attend to bodily needs without assistance   [] 5:Severe disability; bedridden, incontinent and requiring constant nursing care and attention    LABS AND IMAGING:     CBC with Differential:    Lab Results   Component Value Date/Time    WBC 6.3 11/09/2022 07:05 AM    RBC 4.58 11/09/2022 07:05 AM    RBC 3.37 03/08/2012 06:13 AM    HGB 14.1 11/09/2022 07:05 AM    HCT 42.6 11/09/2022 07:05 AM     11/09/2022 07:05 AM     03/08/2012 06:13 AM    MCV 93.0 11/09/2022 07:05 AM    MCH 30.8 11/09/2022 07:05 AM    MCHC 33.1 11/09/2022 07:05 AM    RDW 12.7 11/09/2022 07:05 AM    LYMPHOPCT 26 11/09/2022 07:05 AM    MONOPCT 8 11/09/2022 07:05 AM    BASOPCT 1 11/09/2022 07:05 AM    MONOSABS 0.51 11/09/2022 07:05 AM    LYMPHSABS 1.61 11/09/2022 07:05 AM    EOSABS 0.28 11/09/2022 07:05 AM    BASOSABS 0.05 11/09/2022 07:05 AM    DIFFTYPE NOT REPORTED 02/01/2022 02:28 PM         BMP:    Lab Results   Component Value Date/Time     11/09/2022 07:05 AM    K 4.4 11/09/2022 07:05 AM    CL 98 11/09/2022 07:05 AM    CO2 26 11/09/2022 07:05 AM    BUN 18 11/09/2022 07:05 AM    LABALBU 3.3 11/01/2022 03:42 PM    LABALBU 3.5 11/22/2011 04:32 AM    CREATININE 0.99 11/09/2022 07:05 AM    CREATININE 0.95 11/09/2022 07:00 AM    CALCIUM 9.7 11/09/2022 07:05 AM    GFRAA >60 07/28/2022 05:55 AM    LABGLOM >60 11/09/2022 07:05 AM    GLUCOSE 311 11/09/2022 07:05 AM    GLUCOSE 123 03/08/2012 06:13 AM       Radiology Review:    1.) CT brain without contrast:     No acute intracranial abnormality.      Mild chronic microvascular disease periventricular    2.) MRA head and neck: 11/9/2022: Remote left P2 occlusion     3.) Brain MRI W/O:  NO acute stroke        ASSESSMENT AND PLAN:       Patient Active Problem List   Diagnosis    Chronic pain of left knee    Type 2 diabetes mellitus with diabetic polyneuropathy, with long-term current use of insulin (HCC)    COPD (chronic obstructive pulmonary disease)    Nonintractable migraine, unspecified migraine type    Coronary artery disease due to lipid rich plaque    DDD (degenerative disc disease), lumbar    Chronic, continuous use of opioids    DDD (degenerative disc disease), cervical    Osteoarthritis of cervical spine    GERD (gastroesophageal reflux disease)    Essential hypertension    Mixed hyperlipidemia    Insomnia    Lumbosacral spondylosis without myelopathy    Sacroiliitis, not elsewhere classified ([facility] [address]    Carpal tunnel syndrome    Mixed stress and urge urinary incontinence    Intractable migraine with aura without status migrainosus    Anxiety and depression    Chronic migraine without aura without status migrainosus, not intractable    Morbid (severe) obesity with alveolar hypoventilation ([facility])    Chronic nausea    Lung nodule    Neuropathy    Obstructive sleep apnea syndrome    Asthma with COPD ([facility])    Obesity, Class III, BMI 40-49.9 (morbid obesity) ([facility] [address]    Stage 3 chronic kidney disease (HCC)    Benign hypertension with CKD (chronic kidney disease) stage III (HCC)    Secondary diabetes mellitus with stage 3 chronic kidney disease (GFR 30-59) (HCC)    CKD (chronic kidney disease) stage 3, GFR 30-59 ml/min    Lumbar radiculopathy, chronic    Sessile colonic polyp    Morbid obesity (HCC)    Adenomatous polyp of ascending colon    Irritable bowel syndrome with both constipation and diarrhea    Abdominal bloating    Major depressive disorder, single episode, unspecified    Permanent atrial fibrillation (HCC)    Polyneuropathy, unspecified    Atypical chest pain    Left ventricular diastolic dysfunction    Transaminasemia    Acute respiratory failure with hypoxia and hypercarbia (HCC)    Macrocytosis    Morbid obesity with BMI of 40.0-44.9, adult (HCC)    Chronic diastolic congestive heart failure (HCC)    Oxygen dependent    Chronic bilateral low back pain with bilateral sciatica    Left lower lobe pneumonia    Prolonged Q-T interval on ECG    Chronic respiratory failure with hypoxia (HCC)    Pneumonia    Chest pain       Assessment                 66 y.o. female who presents with COPD, diabetes mellitus, migraine, hypertension, hyperlipidemia, chronic back pain presented to ER with chief complaint of right upper extremity tingling. Patient was previously seen in the ER on 11/8/2022 for right calf pain for which she underwent Doppler scan which was negative for DVT. CT head in the ER negative for any acute intracranial abnormality. Last Known Well (date and time): 2130 11/8/2022    2. Candidate for IV Tenecteplase therapy     Yes []     No  [x] due to the following exclusion criteria: out of time window     3. Candidate for Thrombectomy    Yes []      No [] due to the following exclusion criteria: NO LVO     - Discussed with Dr. Levy Go : done   - MRA head and neck   - MRI Brain WO :  - Carotid US B/L    Medications   -  Aspirin 325  mg daily x1  - Clopidogrel 300  md daily  x1  - Folic acid 1mg BID  - Atorvastatin 40 mg nightly     Labs  - Fasting Lipid panel  - HgbA1c lab      - PT, OT, Speech eval   - Telemetry   - Neuro checks per protocol  - We recommend -160 , is mri negative for stroke   - Blood glucose goal less than 180  - Please avoid dextrose containing solutions        Additional recommendations may follow    Please contact [facility] with any changes in patients neurologic status. Thank you for your consult.        Katy Ng MD   PGY 4 Neurology Resident  11/9/2022 at 9:50 AM

## 2022-11-09 NOTE — ED NOTES
Pt attempted to ambulate using wheelchair \"as a walker\"; pt reported she usually uses a walker at home. Pt did not tolerate ambulation well and sat self in wheelchair few feet from room. Pt reported \"my leg hurts to much I can't do it\". Dr Corey Deluca updated.        Kandy Willis RN  11/09/22 2985

## 2022-11-10 LAB
GLUCOSE BLD-MCNC: 210 MG/DL (ref 65–105)
GLUCOSE BLD-MCNC: 273 MG/DL (ref 65–105)
GLUCOSE BLD-MCNC: 367 MG/DL (ref 65–105)

## 2022-11-10 PROCEDURE — 96372 THER/PROPH/DIAG INJ SC/IM: CPT

## 2022-11-10 PROCEDURE — G0378 HOSPITAL OBSERVATION PER HR: HCPCS

## 2022-11-10 PROCEDURE — 94640 AIRWAY INHALATION TREATMENT: CPT

## 2022-11-10 PROCEDURE — 2700000000 HC OXYGEN THERAPY PER DAY

## 2022-11-10 PROCEDURE — 94660 CPAP INITIATION&MGMT: CPT

## 2022-11-10 PROCEDURE — 97162 PT EVAL MOD COMPLEX 30 MIN: CPT

## 2022-11-10 PROCEDURE — 96374 THER/PROPH/DIAG INJ IV PUSH: CPT

## 2022-11-10 PROCEDURE — 97166 OT EVAL MOD COMPLEX 45 MIN: CPT

## 2022-11-10 PROCEDURE — 6360000002 HC RX W HCPCS: Performed by: EMERGENCY MEDICINE

## 2022-11-10 PROCEDURE — 6370000000 HC RX 637 (ALT 250 FOR IP): Performed by: EMERGENCY MEDICINE

## 2022-11-10 PROCEDURE — 2580000003 HC RX 258: Performed by: EMERGENCY MEDICINE

## 2022-11-10 PROCEDURE — 97535 SELF CARE MNGMENT TRAINING: CPT

## 2022-11-10 PROCEDURE — 82947 ASSAY GLUCOSE BLOOD QUANT: CPT

## 2022-11-10 PROCEDURE — 94761 N-INVAS EAR/PLS OXIMETRY MLT: CPT

## 2022-11-10 PROCEDURE — 6360000002 HC RX W HCPCS

## 2022-11-10 PROCEDURE — 6370000000 HC RX 637 (ALT 250 FOR IP)

## 2022-11-10 PROCEDURE — 97530 THERAPEUTIC ACTIVITIES: CPT

## 2022-11-10 RX ORDER — CYCLOBENZAPRINE HCL 10 MG
10 TABLET ORAL 2 TIMES DAILY PRN
Status: DISCONTINUED | OUTPATIENT
Start: 2022-11-10 | End: 2022-11-11 | Stop reason: HOSPADM

## 2022-11-10 RX ORDER — KETOROLAC TROMETHAMINE 30 MG/ML
15 INJECTION, SOLUTION INTRAMUSCULAR; INTRAVENOUS ONCE
Status: COMPLETED | OUTPATIENT
Start: 2022-11-10 | End: 2022-11-10

## 2022-11-10 RX ORDER — INSULIN LISPRO 100 [IU]/ML
0-8 INJECTION, SOLUTION INTRAVENOUS; SUBCUTANEOUS
Status: DISCONTINUED | OUTPATIENT
Start: 2022-11-10 | End: 2022-11-11 | Stop reason: HOSPADM

## 2022-11-10 RX ORDER — INSULIN LISPRO 100 [IU]/ML
0-4 INJECTION, SOLUTION INTRAVENOUS; SUBCUTANEOUS NIGHTLY
Status: DISCONTINUED | OUTPATIENT
Start: 2022-11-10 | End: 2022-11-11 | Stop reason: HOSPADM

## 2022-11-10 RX ORDER — DEXTROSE MONOHYDRATE 100 MG/ML
INJECTION, SOLUTION INTRAVENOUS CONTINUOUS PRN
Status: DISCONTINUED | OUTPATIENT
Start: 2022-11-10 | End: 2022-11-11 | Stop reason: HOSPADM

## 2022-11-10 RX ADMIN — INSULIN GLARGINE 55 UNITS: 100 INJECTION, SOLUTION SUBCUTANEOUS at 08:07

## 2022-11-10 RX ADMIN — SODIUM CHLORIDE, PRESERVATIVE FREE 10 ML: 5 INJECTION INTRAVENOUS at 20:52

## 2022-11-10 RX ADMIN — CYCLOBENZAPRINE 10 MG: 10 TABLET, FILM COATED ORAL at 14:25

## 2022-11-10 RX ADMIN — ISOSORBIDE DINITRATE 30 MG: 10 TABLET ORAL at 08:06

## 2022-11-10 RX ADMIN — PANTOPRAZOLE SODIUM 40 MG: 40 TABLET, DELAYED RELEASE ORAL at 17:37

## 2022-11-10 RX ADMIN — KETOROLAC TROMETHAMINE 15 MG: 30 INJECTION, SOLUTION INTRAMUSCULAR at 10:23

## 2022-11-10 RX ADMIN — TOPIRAMATE 100 MG: 100 TABLET, FILM COATED ORAL at 20:51

## 2022-11-10 RX ADMIN — CYCLOBENZAPRINE 5 MG: 10 TABLET, FILM COATED ORAL at 08:03

## 2022-11-10 RX ADMIN — ESCITALOPRAM OXALATE 10 MG: 10 TABLET ORAL at 08:07

## 2022-11-10 RX ADMIN — ENOXAPARIN SODIUM 40 MG: 40 INJECTION SUBCUTANEOUS at 08:06

## 2022-11-10 RX ADMIN — FENOFIBRATE 160 MG: 160 TABLET ORAL at 08:07

## 2022-11-10 RX ADMIN — FUROSEMIDE 20 MG: 20 TABLET ORAL at 08:07

## 2022-11-10 RX ADMIN — LOSARTAN POTASSIUM 25 MG: 25 TABLET, FILM COATED ORAL at 08:07

## 2022-11-10 RX ADMIN — OXYCODONE HYDROCHLORIDE AND ACETAMINOPHEN 1 TABLET: 5; 325 TABLET ORAL at 08:03

## 2022-11-10 RX ADMIN — SODIUM CHLORIDE, PRESERVATIVE FREE 10 ML: 5 INJECTION INTRAVENOUS at 09:06

## 2022-11-10 RX ADMIN — Medication 81 MG: at 08:07

## 2022-11-10 RX ADMIN — OXYBUTYNIN CHLORIDE 5 MG: 5 TABLET, EXTENDED RELEASE ORAL at 08:07

## 2022-11-10 RX ADMIN — METFORMIN HYDROCHLORIDE 1000 MG: 500 TABLET ORAL at 17:37

## 2022-11-10 RX ADMIN — Medication 10 MG: at 20:52

## 2022-11-10 RX ADMIN — SODIUM CHLORIDE, PRESERVATIVE FREE 10 ML: 5 INJECTION INTRAVENOUS at 00:08

## 2022-11-10 RX ADMIN — BUDESONIDE AND FORMOTEROL FUMARATE DIHYDRATE 2 PUFF: 160; 4.5 AEROSOL RESPIRATORY (INHALATION) at 09:11

## 2022-11-10 RX ADMIN — OXYCODONE HYDROCHLORIDE AND ACETAMINOPHEN 1 TABLET: 5; 325 TABLET ORAL at 20:55

## 2022-11-10 RX ADMIN — INSULIN LISPRO 2 UNITS: 100 INJECTION, SOLUTION INTRAVENOUS; SUBCUTANEOUS at 17:37

## 2022-11-10 RX ADMIN — CLOPIDOGREL 75 MG: 75 TABLET, FILM COATED ORAL at 08:07

## 2022-11-10 RX ADMIN — FERROUS SULFATE TAB EC 325 MG (65 MG FE EQUIVALENT) 325 MG: 325 (65 FE) TABLET DELAYED RESPONSE at 08:07

## 2022-11-10 RX ADMIN — CARVEDILOL 3.12 MG: 3.12 TABLET, FILM COATED ORAL at 08:07

## 2022-11-10 RX ADMIN — METFORMIN HYDROCHLORIDE 1000 MG: 500 TABLET ORAL at 08:07

## 2022-11-10 RX ADMIN — PANTOPRAZOLE SODIUM 40 MG: 40 TABLET, DELAYED RELEASE ORAL at 08:07

## 2022-11-10 RX ADMIN — INSULIN GLARGINE 55 UNITS: 100 INJECTION, SOLUTION SUBCUTANEOUS at 21:00

## 2022-11-10 RX ADMIN — CYCLOBENZAPRINE 5 MG: 10 TABLET, FILM COATED ORAL at 01:19

## 2022-11-10 RX ADMIN — ATORVASTATIN CALCIUM 40 MG: 80 TABLET, FILM COATED ORAL at 08:07

## 2022-11-10 ASSESSMENT — PAIN SCALES - GENERAL
PAINLEVEL_OUTOF10: 10
PAINLEVEL_OUTOF10: 6
PAINLEVEL_OUTOF10: 7

## 2022-11-10 NOTE — PROGRESS NOTES
Occupational Therapy  Facility/Department: [address] OBSERVATION  Occupational Therapy Initial Assessment    Name: Juani Lacey  : 1950  MRN: [id number]  Date of Service: 11/10/2022    Discharge Recommendations:   Further therapy recommended at discharge. Patient Diagnosis(es): The primary encounter diagnosis was Leg cramping. A diagnosis of RUE numbness was also pertinent to this visit. Past Medical History:  has a past medical history of Abdominal bloating, Adenomatous polyp of ascending colon, Allergic rhinitis, Anxiety, Arthritis, Asthma, Atrial fibrillation ([address] Back pain, Benign hypertension with CKD (chronic kidney disease) stage III ([address] CAD (coronary artery disease), Caffeine use, CHF (congestive heart failure) ([address] Chronic kidney disease, CKD (chronic kidney disease) stage 3, GFR 30-59 ml/min ([facility]), COPD (chronic obstructive pulmonary disease) ([address] Degeneration of lumbar or lumbosacral intervertebral disc, Depression, DM (diabetes mellitus) ([address] Emphysema of lung ([address] Gastritis, GERD (gastroesophageal reflux disease), Hearing loss, Hematuria, Hiatal hernia, HTN (hypertension), benign, Hypertriglyceridemia, Incontinence of urine, Irritable bowel syndrome with both constipation and diarrhea, Knee arthropathy, Lumbosacral spondylosis without myelopathy, Migraine headache, Mumps, Neuropathy, Obesity, On home oxygen therapy, HIGINIO on CPAP, Otitis media, Secondary diabetes mellitus with stage 3 chronic kidney disease (GFR 30-59) ([address] Stroke ([address] Tinnitus, Type 2 diabetes mellitus without complication ([address] Type II or unspecified type diabetes mellitus without mention of complication, not stated as uncontrolled, UTI (lower urinary tract infection), and Varicose vein. Past Surgical History:  has a past surgical history that includes Cholecystectomy (); Carpal tunnel release (Right); Tympanoplasty; Dilation & curettage (); Cystocopy (2013);  Cataract removal with implant (Bilateral); Tonsillectomy; Incontinence surgery; ablation of dysrhythmic focus (2000); Upper gastrointestinal endoscopy (03/2016); eye surgery; Tubal ligation; other surgical history (09/10/15); Insertable Cardiac Monitor (12/04/2015); Tympanoplasty; Colonoscopy; Colonoscopy (04/10/2017); pr colsc flx w/removal lesion by hot bx forceps (N/A, 4/10/2017); Tympanostomy tube placement (08/21/2017); Upper gastrointestinal endoscopy (N/A, 3/2/2021); and Colonoscopy (N/A, 3/2/2021). Assessment   Performance deficits / Impairments: Decreased functional mobility ; Decreased ADL status; Decreased endurance;Decreased high-level IADLs;Decreased strength  Assessment: pt would benefit from further therapy at discharge in order to increase safety and independence. Prognosis: Good  Decision Making: Medium Complexity  REQUIRES OT FOLLOW-UP: Yes  Activity Tolerance  Activity Tolerance: Patient Tolerated treatment well        Plan   Occupational Therapy Plan  Times Per Week: 2-4x/wk     Restrictions  Restrictions/Precautions  Restrictions/Precautions: Fall Risk  Required Braces or Orthoses?: No  Position Activity Restriction  Other position/activity restrictions: up with assist    Subjective   General  Patient assessed for rehabilitation services?: Yes  Family / Caregiver Present: No  General Comment  Comments: RN ok'd fortherapy this morning. pt agreeable to participate in session and cooperative/pleasant throughout .  pt denied pain at rest     Social/Functional History  Social/Functional History  Lives With: Alone  Type of Home: Apartment (8th floor)  Home Layout: One level  Home Access: Elevator, Level entry  Bathroom Shower/Tub: Tub/Shower unit  Bathroom Toilet: Standard  Bathroom Equipment: Grab bars in shower, Shower chair  Home Equipment: [address], Electric scooter (pt reported using electric scooter outside apartment and rollator inside)  ADL Assistance: Independent  Homemaking Responsibilities: No (pt reported HHA completes majority of IADLs)  Ambulation Assistance: Independent  Transfer Assistance: Independent  Active : No  Patient's  Info: cab  Mode of Transportation: Cab  Occupation: On disability  Additional Comments: pt reported having HHA 5 day/wk for 3hr/day who assists with IADLs and occasionally bathing       Objective             Safety Devices  Type of Devices: Call light within reach; Left in bed;Gait belt;Nurse notified  Restraints  Restraints Initially in Place: No    Bed Mobility Training  Bed Mobility Training: Yes  Overall Level of Assistance: Stand-by assistance  Supine to Sit: Stand-by assistance  Sit to Supine: Stand-by assistance  Balance  Sitting: Intact (~20 minutes on eOB)  Standing: Intact (~3 minutes. pt completed functional mobility to window and back to bed with CGA and Rw. pt reported increased R LE pain during functional mobility)  Transfer Training  Transfer Training: Yes  Overall Level of Assistance: Contact-guard assistance (with RW)  Sit to Stand: Contact-guard assistance  Stand to Sit: Contact-guard assistance  Gait  Overall Level of Assistance: Contact-guard assistance (with RW)       AROM: Generally decreased, functional  Strength: Generally decreased, functional (R  4/5, L  4+/5)  Coordination: Within functional limits  Tone: Normal  Sensation: Intact    ADL  Feeding: Independent  Grooming: Modified independent   UE Bathing: Stand by assistance  LE Bathing: Minimal assistance  UE Dressing: Stand by assistance  LE Dressing: Minimal assistance  Toileting: Minimal assistance  Additional Comments: OT facilitated pt in changing brief and completing reina-care. pt required mod A for donning/doffing tab style brief, but able to bridge in bed for brief placement.  pt able to complete reina-care sitting up in bed with no difficulties             Vision  Vision: Impaired  Vision Exceptions: Wears glasses at all times  Hearing  Hearing: Within functional limits    Cognition  Overall Cognitive Status: WFL  Orientation  Overall Orientation Status: Within Functional Limits                    Education Given To: Patient  Education Provided: Role of Therapy;Plan of Care;Transfer Training  Education Method: Verbal  Barriers to Learning: None  Education Outcome: Verbalized understanding    LUE AROM (degrees)  LUE AROM : Exceptions  L Shoulder Flexion 0-180: 0-90  L Elbow Flexion 0-145: WFL  Left Hand AROM (degrees)  Left Hand AROM: WFL  RUE AROM (degrees)  RUE AROM : Exceptions  R Shoulder Flexion 0-180: 0-80, pt reported this is baseline  Right Hand AROM (degrees)  Right Hand AROM: WFL        Hand Dominance  Hand Dominance: Right                                                          AM-PAC Score        AM-PAC Inpatient Daily Activity Raw Score: 20 (11/10/22 1316)  AM-PAC Inpatient ADL T-Scale Score : 42.03 (11/10/22 1316)  ADL Inpatient CMS 0-100% Score: 38.32 (11/10/22 1316)  ADL Inpatient CMS G-Code Modifier : CJ (11/10/22 1316)           Goals  Short Term Goals  Time Frame for Short Term Goals: pt will, by discharge  Short Term Goal 1: complete LB ADLs and toileting tasks with CGA and set up  Short Term Goal 2: complete UB ADLs with mod I  Short Term Goal 3: increase activity tolerance to 20+ minutes in order to participate in daily tasks  Short Term Goal 4: dem SBA during functional transfers/functional mobility with LRD, as needed  Short Term Goal 5: dem ~6 minutes dynamic standing tolerance with SBA in order to complete functional tasks       Therapy Time   Individual Concurrent Group Co-treatment   Time In 0945         Time Out 1022         Minutes 37         Timed Code Treatment Minutes: [facility], OTR/L

## 2022-11-10 NOTE — ED NOTES
Report given to [place], RN with verbal understanding of the pt needs and concern. Pt will be transported to [facility] with all personal belongings together with the pt.        Migdalia Alonso RN  11/09/22 5162

## 2022-11-10 NOTE — H&P
ascending colon, Allergic rhinitis, Anxiety, Arthritis, Asthma, Atrial fibrillation (HCC), Back pain, Benign hypertension with CKD (chronic kidney disease) stage III ([address] CAD (coronary artery disease), Caffeine use, CHF (congestive heart failure) ([address] Chronic kidney disease, CKD (chronic kidney disease) stage 3, GFR 30-59 ml/min ([facility]), COPD (chronic obstructive pulmonary disease) ([address] Degeneration of lumbar or lumbosacral intervertebral disc, Depression, DM (diabetes mellitus) ([address] Emphysema of lung ([address] Gastritis, GERD (gastroesophageal reflux disease), Hearing loss, Hematuria, Hiatal hernia, HTN (hypertension), benign, Hypertriglyceridemia, Incontinence of urine, Irritable bowel syndrome with both constipation and diarrhea, Knee arthropathy, Lumbosacral spondylosis without myelopathy, Migraine headache, Mumps, Neuropathy, Obesity, On home oxygen therapy, HIGINIO on CPAP, Otitis media, Secondary diabetes mellitus with stage 3 chronic kidney disease (GFR 30-59) ([address] Stroke ([address] Tinnitus, Type 2 diabetes mellitus without complication ([address] Type II or unspecified type diabetes mellitus without mention of complication, not stated as uncontrolled, UTI (lower urinary tract infection), and Varicose vein. I have reviewed the past medical history with the patient and it is pertinent to this complaint. SURGICAL HISTORY      has a past surgical history that includes Cholecystectomy (2007); Carpal tunnel release (Right); Tympanoplasty; Dilation & curettage (1979); Cystocopy (9/25/2013); Cataract removal with implant (Bilateral); Tonsillectomy; Incontinence surgery; ablation of dysrhythmic focus (2000); Upper gastrointestinal endoscopy (03/2016); eye surgery; Tubal ligation; other surgical history (09/10/15); Insertable Cardiac Monitor (12/04/2015); Tympanoplasty; Colonoscopy; Colonoscopy (04/10/2017); pr colsc flx w/removal lesion by hot bx forceps (N/A, 4/10/2017);  Tympanostomy tube placement (08/21/2017); Upper gastrointestinal endoscopy (N/A, 3/2/2021); and Colonoscopy (N/A, 3/2/2021). I have reviewed and agree with Surgical History entered and it is pertinent to this complaint. CURRENT MEDICATIONS     cyclobenzaprine (FLEXERIL) tablet 10 mg, BID PRN  insulin lispro (HUMALOG) injection vial 0-8 Units, TID WC  insulin lispro (HUMALOG) injection vial 0-4 Units, Nightly  glucose chewable tablet 16 g, PRN  dextrose bolus 10% 125 mL, PRN   Or  dextrose bolus 10% 250 mL, PRN  glucagon (rDNA) injection 1 mg, PRN  dextrose 10 % infusion, Continuous PRN  albuterol (PROVENTIL) nebulizer solution 2.5 mg, Q6H PRN  atorvastatin (LIPITOR) tablet 40 mg, Daily  budesonide-formoterol (SYMBICORT) 160-4.5 MCG/ACT inhaler 2 puff, BID  carvedilol (COREG) tablet 3.125 mg, BID  clopidogrel (PLAVIX) tablet 75 mg, Daily  escitalopram (LEXAPRO) tablet 10 mg, Daily  fenofibrate (TRIGLIDE) tablet 160 mg, Daily  ferrous sulfate (FE TABS 325) EC tablet 325 mg, Daily with breakfast  furosemide (LASIX) tablet 20 mg, Daily  insulin glargine (LANTUS) injection vial 55 Units, BID  isosorbide dinitrate (ISORDIL) tablet 30 mg, Daily  losartan (COZAAR) tablet 25 mg, Daily  melatonin tablet 10 mg, Nightly  metFORMIN (GLUCOPHAGE) tablet 1,000 mg, BID WC  oxybutynin (DITROPAN-XL) extended release tablet 5 mg, Daily  oxyCODONE-acetaminophen (PERCOCET) 5-325 MG per tablet 1 tablet, Q8H PRN  pantoprazole (PROTONIX) tablet 40 mg, BID AC  aspirin EC tablet 81 mg, Daily  topiramate (TOPAMAX) tablet 100 mg, Nightly  sodium chloride flush 0.9 % injection 5-40 mL, 2 times per day  sodium chloride flush 0.9 % injection 5-40 mL, PRN  0.9 % sodium chloride infusion, PRN  enoxaparin (LOVENOX) injection 40 mg, Daily  acetaminophen (TYLENOL) tablet 650 mg, Q4H PRN  ondansetron (ZOFRAN-ODT) disintegrating tablet 4 mg, Q8H PRN   Or  ondansetron (ZOFRAN) injection 4 mg, Q6H PRN        All medication charted and reviewed.     ALLERGIES     is allergic to robitussin [guaifenesin], codeine, compazine [prochlorperazine maleate], iodides, morphine, moxifloxacin, reglan [metoclopramide], avelox [moxifloxacin hcl in nacl], cipro xr, sulfa antibiotics, and zofran [ondansetron hcl]. FAMILY HISTORY     She indicated that her mother is . She indicated that her father is . She indicated that her sister is . She indicated that the status of her maternal grandmother is unknown.     family history includes Diabetes in her maternal grandmother and mother; Emphysema in her sister; Heart Disease in her mother; Stomach Cancer in her father. The patient denies any pertinent family history. I have reviewed and agree with the family history entered. I have reviewed the Family History and it is not significant to the case    SOCIAL HISTORY      reports that she quit smoking about 22 years ago. Her smoking use included cigarettes. She has a 123.00 pack-year smoking history. She has never used smokeless tobacco. She reports that she does not drink alcohol and does not use drugs. I have reviewed and agree with all Social.  There are no concerns for substance abuse/use. PHYSICAL EXAM     INITIAL VITALS:  height is 5' 2\" (1.575 m) and weight is 220 lb (99.8 kg). Her temperature is 97 °F (36.1 °C). Her blood pressure is 122/68 and her pulse is 73. Her respiration is 18 and oxygen saturation is 93%.       CONSTITUTIONAL: AOx4, no apparent distress, appears stated age    HEAD: normocephalic, atraumatic   EYES: PERRLA, EOMI    ENT: moist mucous membranes, uvula midline   NECK: supple, symmetric   BACK: symmetric   LUNGS: clear to auscultation bilaterally   CARDIOVASCULAR: regular rate and rhythm, no murmurs, rubs or gallops   ABDOMEN: soft, non-tender, non-distended with normal active bowel sounds   NEUROLOGIC:  No focal weakness, no paresthesias   MUSCULOSKELETAL: Right lateral thigh and hip tender to palpation, antalgic gait   SKIN: no rash or wounds       DIFFERENTIAL DIAGNOSIS/MDM:     Muscle strain, bursitis, meralgia paresthetica, fracture were considered. DIAGNOSTIC RESULTS     EKG: All EKG's are interpreted by the Observation Physician who either signs or Co-signs this chart in the absence of a cardiologist.    EKG Interpretation    Interpreted by observation physician    Rhythm: normal sinus   Rate: normal  Axis: left  Ectopy: none  Conduction: normal  ST Segments: no acute change  T Waves: non specific changes  Q Waves: none    Clinical Impression: non-specific EKG    Priya Altman MD      RADIOLOGY:   I directly visualized the following  images and reviewed the radiologist interpretations:    CT HEAD WO CONTRAST    Result Date: 11/9/2022  EXAMINATION: CT OF THE HEAD WITHOUT CONTRAST  11/9/2022 7:05 am TECHNIQUE: CT of the head was performed without the administration of intravenous contrast. Automated exposure control, iterative reconstruction, and/or weight based adjustment of the mA/kV was utilized to reduce the radiation dose to as low as reasonably achievable. COMPARISON: 07/25/2022 HISTORY: ORDERING SYSTEM PROVIDED HISTORY: right arm weakness TECHNOLOGIST PROVIDED HISTORY: right arm weakness Decision Support Exception - unselect if not a suspected or confirmed emergency medical condition->Emergency Medical Condition (MA) Reason for Exam: right arm weakness, tingling FINDINGS: BRAIN/VENTRICLES: There is no acute intracranial hemorrhage, mass effect or midline shift. No abnormal extra-axial fluid collection. The gray-white differentiation is maintained without evidence of an acute infarct. There is no evidence of hydrocephalus. Mild involutional changes are identified within the brain. Mild chronic microvascular disease is identified within periventricular white ORBITS: The visualized portion of the orbits demonstrate no acute abnormality. SINUSES: The visualized paranasal sinuses and mastoid air cells demonstrate no acute abnormality.  SOFT TISSUES/SKULL:  No acute abnormality of the visualized skull or soft tissues. No acute intracranial abnormality. Mild chronic microvascular disease periventricular. MRA HEAD WO CONTRAST    Result Date: 11/9/2022  EXAMINATION: MRI OF THE BRAIN WITHOUT CONTRAST AND MRA HEAD WITHOUT CONTRAST 11/9/2022 9:31 am TECHNIQUE: Multiplanar multisequence MRI of the brain was performed without the administration of intravenous contrast. MRA of the head was performed utilizing time-of-flight imaging with MIP images. No intravenous contrast was administered. COMPARISON: Head CT 11/09/2022 HISTORY: ORDERING SYSTEM PROVIDED HISTORY: stroke alert, right sided paresthesia/weakness FINDINGS: MRI BRAIN: INTRACRANIAL STRUCTURES/VENTRICLES: No evidence of an acute infarct or other acute parenchymal process. No evidence of acute intracranial hemorrhage. There is no evidence of an intracranial mass or extraaxial fluid collection. No significant mass effect or midline shift. Scattered patchy foci of T2/FLAIR hyperintensity are present within supratentorial white matter which is a nonspecific finding but likely represents mild chronic microvascular ischemia. Tiny scattered remote lacunar infarcts throughout the bilateral basal ganglia. There is mild generalized volume loss. Ventricular enlargement concordant with the degree of parenchymal volume loss. The sellar/suprasellar regions appear unremarkable. The normal signal voids within the major dural venous sinuses appear maintained. ORBITS: The visualized portion of the orbits demonstrate no acute abnormality. Bilateral pseudophakia. SINUSES: The visualized paranasal sinuses and mastoid air cells are well aerated. BONES/SOFT TISSUES: The bone marrow signal intensity appears normal. The soft tissues demonstrate no acute abnormality. MRA HEAD: ANTERIOR CIRCULATION: No significant stenosis of the intracranial internal carotid, anterior cerebral, or middle cerebral arteries.  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 The intracranial vertebral arteries, basilar artery and right proximal PCA are patent without high-grade stenosis or occlusion. ANEURYSM: No intracranial aneurysm is seen. No acute infarct or other acute intracranial process. 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 MRI BRAIN WO CONTRAST    Result Date: 11/9/2022  EXAMINATION: MRI OF THE BRAIN WITHOUT CONTRAST AND MRA HEAD WITHOUT CONTRAST 11/9/2022 9:31 am TECHNIQUE: Multiplanar multisequence MRI of the brain was performed without the administration of intravenous contrast. MRA of the head was performed utilizing time-of-flight imaging with MIP images. No intravenous contrast was administered. COMPARISON: Head CT 11/09/2022 HISTORY: ORDERING SYSTEM PROVIDED HISTORY: stroke alert, right sided paresthesia/weakness FINDINGS: MRI BRAIN: INTRACRANIAL STRUCTURES/VENTRICLES: No evidence of an acute infarct or other acute parenchymal process. No evidence of acute intracranial hemorrhage. There is no evidence of an intracranial mass or extraaxial fluid collection. No significant mass effect or midline shift. Scattered patchy foci of T2/FLAIR hyperintensity are present within supratentorial white matter which is a nonspecific finding but likely represents mild chronic microvascular ischemia. Tiny scattered remote lacunar infarcts throughout the bilateral basal ganglia. There is mild generalized volume loss. Ventricular enlargement concordant with the degree of parenchymal volume loss. The sellar/suprasellar regions appear unremarkable. The normal signal voids within the major dural venous sinuses appear maintained. ORBITS: The visualized portion of the orbits demonstrate no acute abnormality. Bilateral pseudophakia. SINUSES: The visualized paranasal sinuses and mastoid air cells are well aerated. BONES/SOFT TISSUES: The bone marrow signal intensity appears normal. The soft tissues demonstrate no acute abnormality.  MRA HEAD: ANTERIOR CIRCULATION: No significant stenosis of the intracranial internal carotid, anterior cerebral, or middle cerebral arteries. 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 The intracranial vertebral arteries, basilar artery and right proximal PCA are patent without high-grade stenosis or occlusion. ANEURYSM: No intracranial aneurysm is seen. No acute infarct or other acute intracranial process. 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 LABS:  I have reviewed and interpreted all available lab results.   Labs Reviewed   STROKE PANEL - Abnormal; Notable for the following components:       Result Value    Glucose 311 (*)     Creatinine 0.99 (*)     Eosinophils % 5 (*)     Myoglobin 61 (*)     All other components within normal limits   HGB/HCT - Abnormal; Notable for the following components:    POC Hemoglobin 16.8 (*)     POC Hematocrit 50 (*)     All other components within normal limits   VENOUS BLOOD GAS, POINT OF CARE - Abnormal; Notable for the following components:    pH, Onel 7.313 (*)     pCO2, Onel 58.4 (*)     HCO3, Venous 29.6 (*)     All other components within normal limits   POCT GLUCOSE - Abnormal; Notable for the following components:    POC Glucose 299 (*)     All other components within normal limits   POC GLUCOSE FINGERSTICK - Abnormal; Notable for the following components:    POC Glucose 366 (*)     All other components within normal limits   POC GLUCOSE FINGERSTICK - Abnormal; Notable for the following components:    POC Glucose 273 (*)     All other components within normal limits   POC GLUCOSE FINGERSTICK - Abnormal; Notable for the following components:    POC Glucose 367 (*)     All other components within normal limits   COVID-19, RAPID   ELECTROLYTES PLUS   CALCIUM, IONIC (POC)   CREATININE W/GFR POINT OF CARE   UREA N (POC)   LACTIC ACID,POINT OF CARE         CDU IMPRESSION / Aisha Collazo is a 66 y.o. female who presents right lower leg pain and difficulty ambulating. Neurology consulted - appreciate their input  MRI negative for acute infarct, remote occlusion in mid left P2 segment  CT head showed no acute abnormality  Neurology signed off, no further neurological work-up required  Continue clopidogrel and statins    Pain management  Flexeril 10 mg 2 times daily, Percocet 3 times daily  Patient only able to ambulate 8 feet with PT today  Will need home healthcare and attending to do homecare and Prema order    Continue home medications and pain control  Monitor vitals, labs, and imaging    DISPO: pending improvement in ambulation and Prema/home healthcare planning      CONSULTS:    IP CONSULT TO STROKE TEAM    PROCEDURES:  Not indicated       PATIENT REFERRED TO:    No follow-up provider specified. --  Johan Iyer MD   Emergency Medicine Resident    This dictation was generated by voice recognition computer software. Although all attempts are made to edit the dictation for accuracy, there may be errors in the transcription that are not intended.

## 2022-11-10 NOTE — PROGRESS NOTES
[facility]  CDU / OBSERVATION ENCOUNTER  ATTENDING NOTE       I performed a history and physical examination of the patient and discussed management with the resident or midlevel provider. I reviewed the resident or midlevel provider's note and agree with the documented findings and plan of care. Any areas of disagreement are noted on the chart. I was personally present for the key portions of any procedures. I have documented in the chart those procedures where I was not present during the key portions. I have reviewed the nurses notes. I agree with the chief complaint, past medical history, past surgical history, allergies, medications, social and family history as documented unless otherwise noted below. The Family history, social history, and ROS are effectively unchanged since admission unless noted elsewhere in the chart. Patient still having some leg discomfort on the right side. Awaiting PT OT evaluation. Patient overall says medication has been helping. She is doing well tolerating oral intake.     Henry Flannery  Attending Emergency  Physician

## 2022-11-10 NOTE — PROGRESS NOTES
Physical Therapy  Facility/Department: [facility] 3C OBSERVATION  Physical Therapy Initial Assessment    Name: Malachi Hernandez  : 1950  MRN: [id number]  Date of Service: 11/10/2022  History copied and pasted from ED note:   Malachi Hernandez is a 66 y.o. female who presents with complaints of right calf spasming started yesterday has continued till today. Was evaluated yesterday in the ER. Had a lower extremity Doppler which was negative. And discharged home. Patient new symptom this morning describes some tingling to her right upper arm that started around 5 AM when patient last known well at 930 last night. Patient states that she woke up around 5 AM with heaviness to her right extremity and tingling. She does report she has had \"mini strokes in the past.  Which were noticed on imaging. Discharge Recommendations:  Further therapy recommended at discharge. PT Equipment Recommendations  Equipment Needed: No      Patient Diagnosis(es): The primary encounter diagnosis was Leg cramping. A diagnosis of RUE numbness was also pertinent to this visit.   Past Medical History:  has a past medical history of Abdominal bloating, Adenomatous polyp of ascending colon, Allergic rhinitis, Anxiety, Arthritis, Asthma, Atrial fibrillation ([address] Back pain, Benign hypertension with CKD (chronic kidney disease) stage III ([address] CAD (coronary artery disease), Caffeine use, CHF (congestive heart failure) ([address] Chronic kidney disease, CKD (chronic kidney disease) stage 3, GFR 30-59 ml/min ([facility]), COPD (chronic obstructive pulmonary disease) ([address] Degeneration of lumbar or lumbosacral intervertebral disc, Depression, DM (diabetes mellitus) ([address] Emphysema of lung ([address] Gastritis, GERD (gastroesophageal reflux disease), Hearing loss, Hematuria, Hiatal hernia, HTN (hypertension), benign, Hypertriglyceridemia, Incontinence of urine, Irritable bowel syndrome with both constipation and diarrhea, Knee arthropathy, Lumbosacral spondylosis without myelopathy, Migraine headache, Mumps, Neuropathy, Obesity, On home oxygen therapy, HIGINIO on CPAP, Otitis media, Secondary diabetes mellitus with stage 3 chronic kidney disease (GFR 30-59) ([facility]), Stroke ([facility] Tinnitus, Type 2 diabetes mellitus without complication ([facility] Type II or unspecified type diabetes mellitus without mention of complication, not stated as uncontrolled, UTI (lower urinary tract infection), and Varicose vein. Past Surgical History:  has a past surgical history that includes Cholecystectomy (2007); Carpal tunnel release (Right); Tympanoplasty; Dilation & curettage (1979); Cystocopy (9/25/2013); Cataract removal with implant (Bilateral); Tonsillectomy; Incontinence surgery; ablation of dysrhythmic focus (2000); Upper gastrointestinal endoscopy (03/2016); eye surgery; Tubal ligation; other surgical history (09/10/15); Insertable Cardiac Monitor (12/04/2015); Tympanoplasty; Colonoscopy; Colonoscopy (04/10/2017); pr colsc flx w/removal lesion by hot bx forceps (N/A, 4/10/2017); Tympanostomy tube placement (08/21/2017); Upper gastrointestinal endoscopy (N/A, 3/2/2021); and Colonoscopy (N/A, 3/2/2021). Assessment   Pt cooperative, willing to get OOB but painful with movement lenore RLE. Limited distance able to ambulate d/t pain. Body Structures, Functions, Activity Limitations Requiring Skilled Therapeutic Intervention: Decreased functional mobility ; Decreased strength;Decreased balance;Decreased posture  Therapy Prognosis: Good  Decision Making: Medium Complexity  Requires PT Follow-Up: Yes  Activity Tolerance  Activity Tolerance: Patient limited by pain     Plan   Physcial Therapy Plan  General Plan:  (5-6 visits weekly)  Current Treatment Recommendations: Strengthening, ROM, Balance training, Functional mobility training, Transfer training, Endurance training, Gait training, Neuromuscular re-education, Pain management, Home exercise program, Safety education & training, Patient/Caregiver education & training, Positioning, Therapeutic activities  Safety Devices  Type of Devices: Call light within reach, Gait belt, Patient at risk for falls, Left in bed, Nurse notified  Restraints  Restraints Initially in Place: No     Restrictions  Restrictions/Precautions  Restrictions/Precautions: Fall Risk  Required Braces or Orthoses?: No  Position Activity Restriction  Other position/activity restrictions: up with assist     Subjective   General  Patient assessed for rehabilitation services?: Yes  Response To Previous Treatment: Not applicable  Family / Caregiver Present: No  Follows Commands: Within Functional Limits  Subjective  Subjective: 5/10 RLE pain at rest; starts at hip, \"spreads to foot\"; 9/10 with mobility         Social/Functional History  Social/Functional History  Lives With: Alone  Type of Home: Apartment  Home Layout: One level  Home Access: Level entry  [employer]: Shower chair  Home Equipment: Wanda Franks, [address] bed (electric scooter)  ADL Assistance: Independent  Ambulation Assistance: Independent  Transfer Assistance: Independent  Active : No (grandkids get her groceries for her, per pt)  Patient's  Info: cab  Mode of Transportation: Cab  Vision/Hearing  Vision  Vision: Within Functional Limits  Hearing  Hearing: Within functional limits    Cognition   Orientation  Overall Orientation Status: Within Functional Limits  Cognition  Overall Cognitive Status: WFL     Objective   Heart Rate: 73  BP: 122/68  MAP (Calculated): 86  Resp: 18  SpO2: 93 %  O2 Device: None (Room air)     Observation/Palpation  Posture: Fair        AROM RLE (degrees)  RLE AROM: Exceptions--PROM WFL--limited AROM d/t pain; ankle dorsiflexion to neutral actively and passively  AROM LLE (degrees)  LLE AROM : WFL  AROM RUE (degrees)  RUE AROM : WFL  AROM LUE (degrees)  LUE AROM : WFL  Strength RLE  Strength RLE: Exception--hip flexion grossly 3/5 and limited by pain; knee flexion 4/5; knee extension 3/5 and limited by pain; ankle grossly 3+/5  Strength LLE  Strength LLE: WFL  Strength RUE  Strength RUE: Exception--grossly 3+/5  Strength LUE  Strength LUE: WFL           Bed mobility  Rolling to Left: Modified independent  Rolling to Right: Modified independent  Supine to Sit: Modified independent  Sit to Supine: Modified independent  Scooting: Modified independent  Bed Mobility Comments: pt used bed rails, which she has at home (hospital bed)  Transfers  Sit to Stand: Supervision  Stand to Sit: Supervision  Stand Pivot Transfers: Supervision  Ambulation  Surface: Level tile  Device: Rolling Walker  Assistance: Supervision  Quality of Gait: flexed posture, does not reduce wt bearing RLE in spite of pain  Distance: 8'x1  More Ambulation?: No  Stairs/Curb  Stairs?: No     Balance  Posture: Fair  Sitting - Static: Good  Sitting - Dynamic: Good  Standing - Static: Good  Standing - Dynamic: Fair  A/AROM Exercises: AROM LLE, AAROM RLE    AM-PAC Score     AM-PAC Inpatient Mobility without Stair Climbing Raw Score : 15 (11/10/22 1305)  AM-PAC Inpatient without Stair Climbing T-Scale Score : 43.03 (11/10/22 1305)  Mobility Inpatient CMS 0-100% Score: 47.43 (11/10/22 1305)  Mobility Inpatient without Stair CMS G-Code Modifier : CK (11/10/22 1305)       Goals  Short Term Goals  Time Frame for Short Term Goals: 12 visits  Short Term Goal 1: bed mob mod independent  Short Term Goal 2: transfers independent  Short Term Goal 3: independent gait with appropriate device x 50'  Short Term Goal 4: independent with HEP for strength/stretching/balance  Patient Goals   Patient Goals : decrease pain, improve walking       Education  Patient Education  Education Given To: Patient  Education Provided: Role of Therapy;Plan of Care;Home Exercise Program;Transfer Training; Fall Prevention Strategies  Education Method: Verbal  Barriers to Learning: None  Education Outcome: Continued education needed      Therapy Time   Individual Concurrent Group Co-treatment   Time In 1110         Time Out 1141         Minutes 31         Timed Code Treatment Minutes: 13 Minutes       Carin River PT

## 2022-11-10 NOTE — CARE COORDINATION
11/10/22 1216   Service Assessment   Patient Orientation Alert and Oriented   Cognition Alert   History Provided By Patient   Primary Caregiver Self  (has aide services 15 hours per week)   Support Systems Family Members   PCP Verified by CM Yes   Last Visit to PCP Within last 3 months   Can patient return to prior living arrangement Yes   Ability to make needs known: Fair   Social/Functional History   Lives With Alone   Type of [facility] chair   Home Equipment Cane;Walker, rolling; Wheelchair-electric;Hospital bed;Oxygen   Active  No   Mode of Transportation Cab   Discharge Planning   Type of Residence Apartment   Living Arrangements Alone   Current Services Prior To Admission Durable Medical Equipment; Oxygen Therapy; Other (Comment)  (aide services)   Current DME Prior to [facility]; Hospital Bed;Oxygen Therapy (Comment); Shower Chair;Walker; Wheelchair; Other (Comment)  (scooter)   Potential Assistance Needed Transportation   DME Ordered? No   Potential Assistance Purchasing Medications No   Type of Home Care Services None   Patient expects to be discharged to: Apartment   Condition of Participation: Discharge Planning   The Plan for Transition of Care is related to the following treatment goals: increased comfort level   Home, has aide services, needs transportation    [phone] spoke with patient after PT/OT, she is agreeable to home therapy. Provided patient with home care list for review and freedom of choice.   Patient would like referral to [address] [place] [address], referral faxed

## 2022-11-11 VITALS
SYSTOLIC BLOOD PRESSURE: 113 MMHG | TEMPERATURE: 98.2 F | HEART RATE: 85 BPM | RESPIRATION RATE: 17 BRPM | OXYGEN SATURATION: 94 % | WEIGHT: 220 LBS | HEIGHT: 62 IN | DIASTOLIC BLOOD PRESSURE: 62 MMHG | BODY MASS INDEX: 40.48 KG/M2

## 2022-11-11 LAB
GLUCOSE BLD-MCNC: 255 MG/DL (ref 65–105)
GLUCOSE BLD-MCNC: 256 MG/DL (ref 65–105)
GLUCOSE BLD-MCNC: 264 MG/DL (ref 65–105)

## 2022-11-11 PROCEDURE — 94761 N-INVAS EAR/PLS OXIMETRY MLT: CPT

## 2022-11-11 PROCEDURE — 6370000000 HC RX 637 (ALT 250 FOR IP): Performed by: EMERGENCY MEDICINE

## 2022-11-11 PROCEDURE — G0378 HOSPITAL OBSERVATION PER HR: HCPCS

## 2022-11-11 PROCEDURE — 94640 AIRWAY INHALATION TREATMENT: CPT

## 2022-11-11 PROCEDURE — 94660 CPAP INITIATION&MGMT: CPT

## 2022-11-11 PROCEDURE — 6370000000 HC RX 637 (ALT 250 FOR IP)

## 2022-11-11 PROCEDURE — 2580000003 HC RX 258: Performed by: EMERGENCY MEDICINE

## 2022-11-11 PROCEDURE — 82947 ASSAY GLUCOSE BLOOD QUANT: CPT

## 2022-11-11 RX ORDER — CYCLOBENZAPRINE HCL 10 MG
10 TABLET ORAL 2 TIMES DAILY PRN
Qty: 20 TABLET | Refills: 0 | Status: SHIPPED | OUTPATIENT
Start: 2022-11-11 | End: 2022-11-28 | Stop reason: SDUPTHER

## 2022-11-11 RX ORDER — OXYCODONE HYDROCHLORIDE AND ACETAMINOPHEN 5; 325 MG/1; MG/1
1 TABLET ORAL EVERY 8 HOURS PRN
Qty: 15 TABLET | Refills: 0 | Status: SHIPPED | OUTPATIENT
Start: 2022-11-11 | End: 2022-11-17 | Stop reason: DRUGHIGH

## 2022-11-11 RX ADMIN — INSULIN LISPRO 4 UNITS: 100 INJECTION, SOLUTION INTRAVENOUS; SUBCUTANEOUS at 09:13

## 2022-11-11 RX ADMIN — CLOPIDOGREL 75 MG: 75 TABLET, FILM COATED ORAL at 08:32

## 2022-11-11 RX ADMIN — CYCLOBENZAPRINE 10 MG: 10 TABLET, FILM COATED ORAL at 08:38

## 2022-11-11 RX ADMIN — ATORVASTATIN CALCIUM 40 MG: 80 TABLET, FILM COATED ORAL at 08:33

## 2022-11-11 RX ADMIN — INSULIN GLARGINE 55 UNITS: 100 INJECTION, SOLUTION SUBCUTANEOUS at 09:13

## 2022-11-11 RX ADMIN — ISOSORBIDE DINITRATE 30 MG: 10 TABLET ORAL at 08:32

## 2022-11-11 RX ADMIN — Medication 81 MG: at 08:33

## 2022-11-11 RX ADMIN — FENOFIBRATE 160 MG: 160 TABLET ORAL at 08:32

## 2022-11-11 RX ADMIN — BUDESONIDE AND FORMOTEROL FUMARATE DIHYDRATE 2 PUFF: 160; 4.5 AEROSOL RESPIRATORY (INHALATION) at 08:34

## 2022-11-11 RX ADMIN — FERROUS SULFATE TAB EC 325 MG (65 MG FE EQUIVALENT) 325 MG: 325 (65 FE) TABLET DELAYED RESPONSE at 08:32

## 2022-11-11 RX ADMIN — PANTOPRAZOLE SODIUM 40 MG: 40 TABLET, DELAYED RELEASE ORAL at 08:32

## 2022-11-11 RX ADMIN — OXYBUTYNIN CHLORIDE 5 MG: 5 TABLET, EXTENDED RELEASE ORAL at 08:38

## 2022-11-11 RX ADMIN — METFORMIN HYDROCHLORIDE 1000 MG: 500 TABLET ORAL at 08:32

## 2022-11-11 RX ADMIN — LOSARTAN POTASSIUM 25 MG: 25 TABLET, FILM COATED ORAL at 08:33

## 2022-11-11 RX ADMIN — CARVEDILOL 3.12 MG: 3.12 TABLET, FILM COATED ORAL at 08:32

## 2022-11-11 RX ADMIN — ESCITALOPRAM OXALATE 10 MG: 10 TABLET ORAL at 08:32

## 2022-11-11 RX ADMIN — SODIUM CHLORIDE, PRESERVATIVE FREE 10 ML: 5 INJECTION INTRAVENOUS at 08:39

## 2022-11-11 RX ADMIN — OXYCODONE HYDROCHLORIDE AND ACETAMINOPHEN 1 TABLET: 5; 325 TABLET ORAL at 08:38

## 2022-11-11 ASSESSMENT — PAIN DESCRIPTION - ORIENTATION: ORIENTATION: RIGHT

## 2022-11-11 ASSESSMENT — PAIN SCALES - GENERAL: PAINLEVEL_OUTOF10: 10

## 2022-11-11 ASSESSMENT — PAIN DESCRIPTION - LOCATION: LOCATION: LEG

## 2022-11-11 NOTE — CARE COORDINATION
Discharge [address] [facility] Case Management Department  Written by: Paola Madison RN    Patient Name: Dustin Sullivan  Attending Provider: Jaelyn Pena MD  Admit Date: 2022  6:35 AM  MRN: [id number]  Account: [de-identified]                     : 1950  Discharge Date: 22  Vishal Murphy with [facility] living called accepted    Disposition: home with [place] [facility] called office will retrieve [place] paperwork from [address] [place] [address]     14:53 transportation arrangements made through insurance company trip id # [phone] [facility]

## 2022-11-11 NOTE — DISCHARGE SUMMARY
CDU Discharge Summary        Patient:  Diaen Chaidez  YOB: 1950    MRN: [id number]   Acct: [de-identified]    Primary Care Physician: VERONIKA Rivera CNP    Admit date:  11/9/2022  6:35 AM  Discharge date: No discharge date for patient encounter. 11/11/2022    Discharge Diagnoses:     Acute right leg pain due to leg cramping  Improved with muscle relaxers PT OT    Follow-up:  Call today/tomorrow for a follow up appointment with VERONIKA Rivera CNP , or return to the Emergency Room with worsening symptoms    Stressed to patient the importance of following up with primary care doctor for further workup/management of symptoms. Pt verbalizes understanding and agrees with plan. Discharge Medications:  Changes to medications patient was given Flexeril as this helped with her pain. Medication List        CHANGE how you take these medications      * cyclobenzaprine 5 MG tablet  Commonly known as: FLEXERIL  Take 1 tablet by mouth 2 times daily as needed for Muscle spasms  What changed: Another medication with the same name was added. Make sure you understand how and when to take each. * cyclobenzaprine 10 MG tablet  Commonly known as: FLEXERIL  Take 1 tablet by mouth 2 times daily as needed for Muscle spasms  What changed: You were already taking a medication with the same name, and this prescription was added. Make sure you understand how and when to take each. True Metrix Blood Glucose Test strip  Generic drug: blood glucose test strips  THREE TIMES A DAY  What changed: Another medication with the same name was removed. Continue taking this medication, and follow the directions you see here. * This list has 2 medication(s) that are the same as other medications prescribed for you. Read the directions carefully, and ask your doctor or other care provider to review them with you.                 CONTINUE taking these medications      albuterol (2.5 MG/3ML) 0.083% nebulizer solution  Commonly known as: PROVENTIL  Take 3 mLs by nebulization every 6 hours as needed for Wheezing     atorvastatin 40 MG tablet  Commonly known as: Lipitor  Take 1 tablet by mouth daily     Basaglar KwikPen 100 UNIT/ML injection pen  Generic drug: insulin glargine  Inject 55 Units into the skin 2 times daily     budesonide-formoterol 160-4.5 MCG/ACT Aero  Commonly known as: Symbicort  Inhale 2 puffs into the lungs in the morning and 2 puffs before bedtime. As needed for sob.      carvedilol 3.125 MG tablet  Commonly known as: COREG  TAKE 1 TAB BY MOUTH TWICE A DAY ( IN THE MORNING AND BEDTIME )     CertaVite/Antioxidants Tabs  TAKE 1 TABLET BY MOUTH ONCE DAILY     clopidogrel 75 MG tablet  Commonly known as: PLAVIX  TAKE 1 TAB BY MOUTH ONCE A DAY ( IN THE MORNING ) -THIS MEDICINE MAY BE TAKENWITH OR WITHOUT FOOD     dicyclomine 10 MG capsule  Commonly known as: BENTYL  TAKE 1 CAPSULE BY MOUTH TWICE A DAY AS NEEDED FOR ABDOMINAL SPASMS      MG capsule  Generic drug: docusate sodium  TAKE 1 CAPSULE BY MOUTH TWICE A DAY     escitalopram 10 MG tablet  Commonly known as: LEXAPRO  Take 1 tablet by mouth daily     fenofibrate 160 MG tablet  Commonly known as: TRIGLIDE  TAKE 1 TABLET BY MOUTH DAILY     FeroSul 325 (65 Fe) MG tablet  Generic drug: ferrous sulfate  TAKE 1 TAB BY MOUTH IN THE MORNING     furosemide 20 MG tablet  Commonly known as: LASIX  TAKE 1 TABLET BY MOUTH ONCE DAILY AS NEEDED     isosorbide dinitrate 30 MG tablet  Commonly known as: ISORDIL  TAKE 1 TABLET BY MOUTH ONCE DAILY     lidocaine 4 % external patch  Place 1 patch onto the skin daily for 10 days     losartan 25 MG tablet  Commonly known as: COZAAR  TAKE 1 TABLET BY MOUTH ONCE DAILY     magnesium oxide 400 MG tablet  Commonly known as: MAG-OX     Melatonin 10 MG Tabs  Take 10 mg by mouth nightly     metFORMIN 1000 MG tablet  Commonly known as: GLUCOPHAGE  Take 1 tablet by mouth 2 times daily (with meals)     NovoLOG FlexPen 100 UNIT/ML injection pen  Generic drug: insulin aspart  IF<139 NO INSULIN; 140-199-2 UN;200-249-4 UN;250-299-6 UN;300-349-8 UN;350-400=10 UN;ABOVE 400-12 UNIT(S)     oxybutynin 5 MG extended release tablet  Commonly known as: DITROPAN-XL  TAKE 1 TABLET BY MOUTH DAILY     oxyCODONE-acetaminophen 5-325 MG per tablet  Commonly known as: Percocet  Take 1 tablet by mouth every 8 hours as needed for Pain for up to 5 days. Intended supply: 30 days. OXYGEN     pantoprazole 40 MG tablet  Commonly known as: PROTONIX  TAKE 1 TABLET BY MOUTH TWICE A DAY     SM Aspirin Adult Low Strength 81 MG EC tablet  Generic drug: aspirin  TAKE 1 TABLET BY MOUTH ONCE DAILY     topiramate 100 MG tablet  Commonly known as: TOPAMAX  TAKE 1 TABLET BY MOUTH NIGHTLY     Trulicity 1.5 AC/7.9FD SC injection  Generic drug: dulaglutide  INJECT ONE AND A HALF (1 & 1/2) MG INTO THE SKIN ONCE A WEEK STOP 0.75     vitamin B-12 100 MCG tablet  Commonly known as: CYANOCOBALAMIN  take half a tablet BY MOUTH ONCE DAILY               Where to Get Your Medications        You can get these medications from any pharmacy    Bring a paper prescription for each of these medications  cyclobenzaprine 10 MG tablet  oxyCODONE-acetaminophen 5-325 MG per tablet         Diet:  ADULT DIET; Regular; 4 carb choices (60 gm/meal) , Advance as tolerated     Activity:  As tolerated    Consultants: IP CONSULT TO STROKE TEAM  IP CONSULT TO HOME CARE NEEDS    Procedures:  Not indicated     Diagnostic Test:   Results for orders placed or performed during the hospital encounter of 11/09/22   COVID-19, Rapid    Specimen: Nasopharyngeal Swab   Result Value Ref Range    Specimen Description . NASOPHARYNGEAL SWAB     SARS-CoV-2, Rapid Not Detected Not Detected   STROKE PANEL   Result Value Ref Range    Glucose 311 (H) 70 - 99 mg/dL    BUN 18 8 - 23 mg/dL    Creatinine 0.99 (H) 0.50 - 0.90 mg/dL    Est, Glom Filt Rate >60 >60 mL/min/1.73m2    Calcium 9.7 8.6 - 10.4 mg/dL Sodium 137 135 - 144 mmol/L    Potassium 4.4 3.7 - 5.3 mmol/L    Chloride 98 98 - 107 mmol/L    CO2 26 20 - 31 mmol/L    Anion Gap 13 9 - 17 mmol/L    WBC 6.3 3.5 - 11.3 k/uL    RBC 4.58 3.95 - 5.11 m/uL    Hemoglobin 14.1 11.9 - 15.1 g/dL    Hematocrit 42.6 36.3 - 47.1 %    MCV 93.0 82.6 - 102.9 fL    MCH 30.8 25.2 - 33.5 pg    MCHC 33.1 28.4 - 34.8 g/dL    RDW 12.7 11.8 - 14.4 %    Platelets 360 557 - 698 k/uL    MPV 11.6 8.1 - 13.5 fL    NRBC Automated 0.0 0.0 per 100 WBC    Total CK 81 26 - 192 U/L    Seg Neutrophils 60 36 - 65 %    Lymphocytes 26 24 - 43 %    Monocytes 8 3 - 12 %    Eosinophils % 5 (H) 1 - 4 %    Basophils 1 0 - 2 %    Immature Granulocytes 0 0 %    Segs Absolute 3.79 1.50 - 8.10 k/uL    Absolute Lymph # 1.61 1.10 - 3.70 k/uL    Absolute Mono # 0.51 0.10 - 1.20 k/uL    Absolute Eos # 0.28 0.00 - 0.44 k/uL    Basophils Absolute 0.05 0.00 - 0.20 k/uL    Absolute Immature Granulocyte <0.03 0.00 - 0.30 k/uL    Myoglobin 61 (H) 25 - 58 ng/mL    Protime 10.6 9.1 - 12.3 sec    INR 1.0     PTT 21.4 20.5 - 30.5 sec    Troponin, High Sensitivity 12 0 - 14 ng/L   ELECTROLYTES PLUS   Result Value Ref Range    POC Sodium 140 138 - 146 mmol/L    POC Potassium 4.3 3.5 - 4.5 mmol/L    POC Chloride 101 98 - 107 mmol/L    POC TCO2 30 22 - 30 mmol/L    Anion Gap 10 7 - 16 mmol/L   Hemoglobin and hematocrit, blood   Result Value Ref Range    POC Hemoglobin 16.8 (H) 12.0 - 16.0 g/dL    POC Hematocrit 50 (H) 36 - 46 %   CALCIUM, IONIC (POC)   Result Value Ref Range    POC Ionized Calcium 1.26 1.15 - 1.33 mmol/L   Venous Blood Gas, POC   Result Value Ref Range    pH, Onel 7.313 (L) 7.320 - 7.430    pCO2, Onel 58.4 (H) 41.0 - 51.0 mm Hg    pO2, Onel 44.6 30.0 - 50.0 mm Hg    HCO3, Venous 29.6 (H) 22.0 - 29.0 mmol/L    Positive Base Excess, Onel 2 0.0 - 3.0    O2 Sat, Onel 75 60.0 - 85.0 %   Creatinine W/GFR Point of Care   Result Value Ref Range    POC Creatinine 0.95 0.51 - 1.19 mg/dL    eGFR, POC >60 mL/min/1.73m2   POCT urea (BUN)   Result Value Ref Range    POC BUN 20 8 - 26 mg/dL   Lactic Acid, POC   Result Value Ref Range    POC Lactic Acid 0.88 0.56 - 1.39 mmol/L   POCT Glucose   Result Value Ref Range    POC Glucose 299 (H) 74 - 100 mg/dL   POC Glucose Fingerstick   Result Value Ref Range    POC Glucose 366 (H) 65 - 105 mg/dL   POC Glucose Fingerstick   Result Value Ref Range    POC Glucose 273 (H) 65 - 105 mg/dL   POC Glucose Fingerstick   Result Value Ref Range    POC Glucose 367 (H) 65 - 105 mg/dL   POC Glucose Fingerstick   Result Value Ref Range    POC Glucose 210 (H) 65 - 105 mg/dL   POC Glucose Fingerstick   Result Value Ref Range    POC Glucose 264 (H) 65 - 105 mg/dL   POC Glucose Fingerstick   Result Value Ref Range    POC Glucose 255 (H) 65 - 105 mg/dL   POC Glucose Fingerstick   Result Value Ref Range    POC Glucose 256 (H) 65 - 105 mg/dL     CT HEAD WO CONTRAST    Result Date: 11/9/2022  EXAMINATION: CT OF THE HEAD WITHOUT CONTRAST  11/9/2022 7:05 am TECHNIQUE: CT of the head was performed without the administration of intravenous contrast. Automated exposure control, iterative reconstruction, and/or weight based adjustment of the mA/kV was utilized to reduce the radiation dose to as low as reasonably achievable. COMPARISON: 07/25/2022 HISTORY: ORDERING SYSTEM PROVIDED HISTORY: right arm weakness TECHNOLOGIST PROVIDED HISTORY: right arm weakness Decision Support Exception - unselect if not a suspected or confirmed emergency medical condition->Emergency Medical Condition (MA) Reason for Exam: right arm weakness, tingling FINDINGS: BRAIN/VENTRICLES: There is no acute intracranial hemorrhage, mass effect or midline shift. No abnormal extra-axial fluid collection. The gray-white differentiation is maintained without evidence of an acute infarct. There is no evidence of hydrocephalus. Mild involutional changes are identified within the brain.  Mild chronic microvascular disease is identified within periventricular white ORBITS: The visualized portion of the orbits demonstrate no acute abnormality. SINUSES: The visualized paranasal sinuses and mastoid air cells demonstrate no acute abnormality. SOFT TISSUES/SKULL:  No acute abnormality of the visualized skull or soft tissues. No acute intracranial abnormality. Mild chronic microvascular disease periventricular. MRA HEAD WO CONTRAST    Result Date: 11/9/2022  EXAMINATION: MRI OF THE BRAIN WITHOUT CONTRAST AND MRA HEAD WITHOUT CONTRAST 11/9/2022 9:31 am TECHNIQUE: Multiplanar multisequence MRI of the brain was performed without the administration of intravenous contrast. MRA of the head was performed utilizing time-of-flight imaging with MIP images. No intravenous contrast was administered. COMPARISON: Head CT 11/09/2022 HISTORY: ORDERING SYSTEM PROVIDED HISTORY: stroke alert, right sided paresthesia/weakness FINDINGS: MRI BRAIN: INTRACRANIAL STRUCTURES/VENTRICLES: No evidence of an acute infarct or other acute parenchymal process. No evidence of acute intracranial hemorrhage. There is no evidence of an intracranial mass or extraaxial fluid collection. No significant mass effect or midline shift. Scattered patchy foci of T2/FLAIR hyperintensity are present within supratentorial white matter which is a nonspecific finding but likely represents mild chronic microvascular ischemia. Tiny scattered remote lacunar infarcts throughout the bilateral basal ganglia. There is mild generalized volume loss. Ventricular enlargement concordant with the degree of parenchymal volume loss. The sellar/suprasellar regions appear unremarkable. The normal signal voids within the major dural venous sinuses appear maintained. ORBITS: The visualized portion of the orbits demonstrate no acute abnormality. Bilateral pseudophakia. SINUSES: The visualized paranasal sinuses and mastoid air cells are well aerated.  BONES/SOFT TISSUES: The bone marrow signal intensity appears normal. The soft tissues demonstrate no acute abnormality. MRA HEAD: ANTERIOR CIRCULATION: No significant stenosis of the intracranial internal carotid, anterior cerebral, or middle cerebral arteries. 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 The intracranial vertebral arteries, basilar artery and right proximal PCA are patent without high-grade stenosis or occlusion. ANEURYSM: No intracranial aneurysm is seen. No acute infarct or other acute intracranial process. 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 XR CHEST PORTABLE    Result Date: 11/1/2022  EXAMINATION: ONE XRAY VIEW OF THE CHEST 11/1/2022 2:16 pm COMPARISON: 08/26/2022 HISTORY: Acute chest pain. FINDINGS: Normal cardiomediastinal silhouette. Cardiac loop recorder. Bibasilar atelectasis. No consolidation, pleural effusion, or pneumothorax. Bibasilar atelectasis. VL DUP LOWER EXTREMITY VENOUS BILATERAL    Result Date: 11/8/2022    [facility]  Vascular Lower Extremities DVT Study Procedure   Patient Name  Roseline Renee     Date of Study         11/08/2022                [place]   Date of Birth 1950  Gender                Female   Age           66 year(s)  Race                     Room Number   32   Corporate ID  [id number]  #   Patient Acct  [de-identified]  #   MR #          [id number]     PAULA Chong, SHEILA   Accession #   [id number]  Interpreting          David Sotomayor                            Physician   Referring                 Referring Physician   Bhaskar Meyers  Nurse  Practitioner  Procedure Type of Study:   Veins: Lower Extremities DVT Study, Venous Scan Lower Bilateral.  Indications for Study:Calf tenderness. Patient Status:ER. Conclusions   Summary   Bilateral:  No evidence of deep or superficial venous thrombosis.    Signature   ----------------------------------------------------------------  Electronically signed by Bev Bobby RVT,  SHEILA(Sonographer) on 11/08/2022 02:46 PM  ----------------------------------------------------------------   ----------------------------------------------------------------  Electronically signed by Cinthya SotomayorInterpreting physician)  on 11/08/2022 05:37 PM  ----------------------------------------------------------------  Findings:   Right Impression:                    Left Impression:  The common femoral, femoral,         The common femoral, femoral,  popliteal and tibial veins           popliteal and tibial veins  demonstrate normal compressibility   demonstrate normal compressibility  and augmentation. and augmentation. Normal compressibility of the great  Normal compressibility of the great  saphenous vein. saphenous vein. Normal compressibility of the small  Normal compressibility of the small  saphenous vein. saphenous vein. Risk Factors History +------------------------------------------------------+----------+--------+ ! Diagnosis                                             ! Date      ! Comments! +------------------------------------------------------+----------+--------+ ! COPD                                                  !          !        ! +------------------------------------------------------+----------+--------+ ! Stroke                                                !          !        ! +------------------------------------------------------+----------+--------+ ! CAD                                                   !          !        ! +------------------------------------------------------+----------+--------+ ! CHF                                                   !          !        ! +------------------------------------------------------+----------+--------+ ! Previous Scan                                         !02/03/2020! wnl     ! +------------------------------------------------------+----------+--------+   - The patient's risk factor(s) include: chronic lung disease, diabetes     mellitus, dyslipidemia, obesity and arterial hypertension. Allergies   - Allergy:Iodine(Drug). - Allergy:Sulfa(Drug). - Allergy:Codeine(Drug). - Allergy:Other(Drug). Comments:Drug Allergies - CIPRO, AVELOX Velocities are measured in cm/s ; Diameters are measured in cm Right Lower Extremities DVT Study Measurements Right 2D Measurements +------------------------------------+----------+---------------+----------+ ! Location                            ! Visualized! Compressibility! Thrombosis! +------------------------------------+----------+---------------+----------+ ! Common Femoral                      !Yes       ! Yes            ! None      ! +------------------------------------+----------+---------------+----------+ ! Prox Femoral                        !Yes       ! Yes            ! None      ! +------------------------------------+----------+---------------+----------+ ! Mid Femoral                         !Yes       ! Yes            ! None      ! +------------------------------------+----------+---------------+----------+ ! Dist Femoral                        !Yes       ! Yes            ! None      ! +------------------------------------+----------+---------------+----------+ ! Popliteal                           !Yes       ! Yes            ! None      ! +------------------------------------+----------+---------------+----------+ ! Sapheno Femoral Junction            ! Yes       ! Yes            ! None      ! +------------------------------------+----------+---------------+----------+ ! PTV                                 ! Yes       ! Yes            ! None      ! +------------------------------------+----------+---------------+----------+ ! Peroneal                            !Partial   !Yes            ! None      ! +------------------------------------+----------+---------------+----------+ ! Gastroc                             ! Yes       ! Yes            ! None      ! +------------------------------------+----------+---------------+----------+ ! GSV Thigh                           ! Yes       ! Yes            ! None      ! +------------------------------------+----------+---------------+----------+ ! GSV Knee                            ! Yes       ! Yes            ! None      ! +------------------------------------+----------+---------------+----------+ ! GSV Ankle                           ! Yes       ! Yes            ! None      ! +------------------------------------+----------+---------------+----------+ ! SSV                                 ! Yes       ! Yes            ! None      ! +------------------------------------+----------+---------------+----------+ Right Doppler Measurements +---------------------------+------+------+--------------------------------+ ! Location                   ! Signal!Reflux! Reflux (msec)                   ! +---------------------------+------+------+--------------------------------+ ! Common Femoral             !Phasic!      !                                ! +---------------------------+------+------+--------------------------------+ ! Prox Femoral               !Phasic!      !                                ! +---------------------------+------+------+--------------------------------+ ! Popliteal                  !Phasic!      !                                ! +---------------------------+------+------+--------------------------------+ Left Lower Extremities DVT Study Measurements Left 2D Measurements +------------------------------------+----------+---------------+----------+ ! Location                            ! Visualized! Compressibility! Thrombosis! +------------------------------------+----------+---------------+----------+ ! Common Femoral                      !Yes       ! Yes            ! None      ! +------------------------------------+----------+---------------+----------+ ! Prox Femoral                        !Yes       ! Yes            ! None      ! +------------------------------------+----------+---------------+----------+ ! Mid Femoral                         !Yes       ! Yes            ! None      ! +------------------------------------+----------+---------------+----------+ ! Deep Femoral                        !Yes       ! Yes            ! None      ! +------------------------------------+----------+---------------+----------+ ! Popliteal                           !Yes       ! Yes            ! None      ! +------------------------------------+----------+---------------+----------+ ! Sapheno Femoral Junction            ! Yes       ! Yes            ! None      ! +------------------------------------+----------+---------------+----------+ ! PTV                                 ! Yes       ! Yes            ! None      ! +------------------------------------+----------+---------------+----------+ ! Peroneal                            !No        !               !          ! +------------------------------------+----------+---------------+----------+ ! Gastroc                             ! Yes       ! Yes            ! None      ! +------------------------------------+----------+---------------+----------+ ! GSV Thigh                           ! Yes       ! Yes            ! None      ! +------------------------------------+----------+---------------+----------+ ! GSV Knee                            ! Yes       ! Yes            ! None      ! +------------------------------------+----------+---------------+----------+ ! GSV Ankle                           ! Yes       ! Yes            ! None      ! +------------------------------------+----------+---------------+----------+ ! SSV                                 ! Yes       ! Yes            ! None      ! +------------------------------------+----------+---------------+----------+ Left Doppler Measurements +---------------------------+------+------+--------------------------------+ ! Location                   ! Signal!Reflux! Reflux (msec) ! +---------------------------+------+------+--------------------------------+ ! Common Femoral             !Phasic!      !                                ! +---------------------------+------+------+--------------------------------+ ! Prox Femoral               !Phasic!      !                                ! +---------------------------+------+------+--------------------------------+ ! Popliteal                  !Phasic!      !                                ! +---------------------------+------+------+--------------------------------+    MRI BRAIN WO CONTRAST    Result Date: 11/9/2022  EXAMINATION: MRI OF THE BRAIN WITHOUT CONTRAST AND MRA HEAD WITHOUT CONTRAST 11/9/2022 9:31 am TECHNIQUE: Multiplanar multisequence MRI of the brain was performed without the administration of intravenous contrast. MRA of the head was performed utilizing time-of-flight imaging with MIP images. No intravenous contrast was administered. COMPARISON: Head CT 11/09/2022 HISTORY: ORDERING SYSTEM PROVIDED HISTORY: stroke alert, right sided paresthesia/weakness FINDINGS: MRI BRAIN: INTRACRANIAL STRUCTURES/VENTRICLES: No evidence of an acute infarct or other acute parenchymal process. No evidence of acute intracranial hemorrhage. There is no evidence of an intracranial mass or extraaxial fluid collection. No significant mass effect or midline shift. Scattered patchy foci of T2/FLAIR hyperintensity are present within supratentorial white matter which is a nonspecific finding but likely represents mild chronic microvascular ischemia. Tiny scattered remote lacunar infarcts throughout the bilateral basal ganglia. There is mild generalized volume loss. Ventricular enlargement concordant with the degree of parenchymal volume loss. The sellar/suprasellar regions appear unremarkable. The normal signal voids within the major dural venous sinuses appear maintained. ORBITS: The visualized portion of the orbits demonstrate no acute abnormality. Bilateral pseudophakia. SINUSES: The visualized paranasal sinuses and mastoid air cells are well aerated. BONES/SOFT TISSUES: The bone marrow signal intensity appears normal. The soft tissues demonstrate no acute abnormality. MRA HEAD: ANTERIOR CIRCULATION: No significant stenosis of the intracranial internal carotid, anterior cerebral, or middle cerebral arteries. 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 The intracranial vertebral arteries, basilar artery and right proximal PCA are patent without high-grade stenosis or occlusion. ANEURYSM: No intracranial aneurysm is seen. No acute infarct or other acute intracranial process. 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 Physical Exam:    General appearance - NAD, AOx 3   Lungs -CTAB, no R/R/R  Heart - RRR, no M/R/G  Abdomen - Soft, NT/ND  Neurological:  MAEx4, No focal motor deficit, sensory loss  Extremities - Cap refil <2 sec in all ext., no edema  Skin -warm, dry      Hospital Course:  Clinical course has improved, labs and imaging reviewed. Daniel Zepeda originally presented to the hospital on 11/9/2022  6:35 AM. with leg pain and difficulty ambulating. At that time it was determined that She required further observation and pain medications and PT OT evaluation. Patient was given pain medications and evaluated by PT OT. PT OT establish that patient will be safe to go home with home health. With these recommendations patient was discharged home with home health. She was admitted and labs and imaging were followed daily. Imaging results as above. She is medically stable to be discharged. Disposition: Home    Patient stated that they will not drive themselves home from the hospital if they have gotten pain killers/ narcotics earlier that day and that they will arrange for transportation on their own or work with the  for a ride.  Patient counseled NOT to drive while under the influence of narcotics/ pain killers. Condition: Good    Patient stable and ready for discharge home. I have discussed plan of care with patient and they are in understanding. They were instructed to read discharge paperwork. All of their questions and concerns were addressed. Time Spent: 0 day      --  Daiana Abarca DO  Emergency Medicine Resident Physician    This dictation was generated by voice recognition computer software. Although all attempts are made to edit the dictation for accuracy, there may be errors in the transcription that are not intended.

## 2022-11-11 NOTE — PROGRESS NOTES
[address]  CDU / OBSERVATION ENCOUNTER  ATTENDING NOTE       I performed a history and physical examination of the patient and discussed management with the resident or midlevel provider. I reviewed the resident or midlevel provider's note and agree with the documented findings and plan of care. Any areas of disagreement are noted on the chart. I was personally present for the key portions of any procedures. I have documented in the chart those procedures where I was not present during the key portions. I have reviewed the nurses notes. I agree with the chief complaint, past medical history, past surgical history, allergies, medications, social and family history as documented unless otherwise noted below. The Family history, social history, and ROS are effectively unchanged since admission unless noted elsewhere in the chart. Patient admitted to the ETU for intractable leg pain and cramping. Patient was seen by neurology but they have since signed off. Patient is being evaluated by PT OT. Patient says she continues to have the cramps in her legs but has no new complaints or worsening of her complaints. Plan is for patient to be discharged home with arrangements for home care. A MAY has been signed for home care. We will plan to discharge home today.     Shola Calvin MD  Attending Emergency  Physician

## 2022-11-11 NOTE — DISCHARGE INSTRUCTIONS
Take your medication as indicated and prescribed. For pain use acetaminophen (Tylenol) or ibuprofen (Motrin / Advil), unless prescribed medications that have acetaminophen or ibuprofen (or similar medications) in it. You can take over the counter acetaminophen tablets (1 - 2 tablets of the 500-mg strength every 6 hours) or ibuprofen tablets (2 tablets every 4 hours). Use an ice pack or bag filled with ice and apply to the injured area 3 - 4 times a day for 15 - 20 minutes each time. If the injury is older than 3 days, then use a heating pad to help relax the muscles. PLEASE RETURN TO THE EMERGENCY DEPARTMENT IMMEDIATELY for worsening of pain, decrease sensation to arms or legs, inability to move arms or legs, shortness of breath, severe chest pain, excessive nausea or vomiting, notice any bruising to your abdomen or have increase in abdominal pain, or if you develop any concerning symptoms such as: high fever not relieved by acetaminophen (Tylenol) and/or ibuprofen (Motrin / Advil), chills, feeling of your heart fluttering or racing, persistent nausea and/or vomiting, vomiting up blood, blood in your stool, loss of consciousness, numbness, weakness or tingling in the arms or legs or change in color of the extremities, changes in mental status, persistent headache, blurry vision, loss of bladder / bowel control, unable to follow up with your physician, or other any other care or concern.

## 2022-11-11 NOTE — DISCHARGE INSTR - COC
Continuity of Care Form    Patient Name: Dustin Sullivan   :  1950  MRN:  [id number]    Admit date:  2022  Discharge date:  22    Code Status Order: Full Code   Advance Directives:     Admitting Physician:  Jaelyn Pena MD  PCP: VERONIKA Taylor CNP    Discharging Nurse: [facility] Unit/Room#: 4450/4550-15  Discharging Unit Phone Number: [phone]    Emergency Contact:   Extended Emergency Contact Information  Primary Emergency Contact: Aden Kirkland *HIPAA  Address: Epifanio Sack of [address] [place] [address] Phone: [phone]  Work Phone: [phone]  Mobile Phone: [phone]  Relation: Child    Past Surgical History:  Past Surgical History:   Procedure Laterality Date    ABLATION OF DYSRHYTHMIC FOCUS      CARPAL TUNNEL RELEASE Right     CATARACT REMOVAL WITH IMPLANT Bilateral     CHOLECYSTECTOMY      COLONOSCOPY      COLONOSCOPY  04/10/2017    tubular adenomo polyp , mod. sigmoid diverticulosis, min. int. hemorrhoids    COLONOSCOPY N/A 3/2/2021    COLONOSCOPY WITH BIOPSY performed by Kiki Briceno MD at [address] [place] [address]  2013    DILATION AND [address]      laser    INCONTINENCE SURGERY      Percutaneous nerve stimulation Dr Rosa Mcneal 2013     INSERTABLE CARDIAC MONITOR  2015    [employer] REVEAL [facility] #[id number] MRI CONDTIONAL OK 3T.  IMMEDIATELY POST IMPLANT    OTHER SURGICAL HISTORY  09/10/15    removal of interstim    MI COLSC FLX W/REMOVAL LESION BY HOT BX FORCEPS N/A 4/10/2017    COLONOSCOPY POLYPECTOMY HOT BIOPSY performed by Stu Cruz MD at [place] [address]  2017    UPPER GASTROINTESTINAL ENDOSCOPY  2016    Dr Claudeen Sow N/A 3/2/2021    EGD BIOPSY performed by Kiki Briceno MD at [address] [place] [address]       Immunization History:   Immunization History   Administered Date(s) Administered    COVID-19, MODERNA BLUE border, Primary or Immunocompromised, (age 12y+), IM, 100 mcg/0.5mL 02/10/2021, 03/10/2021, 11/03/2021    COVID-19, MODERNA Bivalent BOOSTER, (age 12y+), IM, 48 mcg/0.5 mL 10/28/2022    COVID-19, [place] Vaccine, Vaccine Unspecified 02/10/2021, 03/10/2021, 11/03/2021, 05/20/2022    Influenza 10/18/2012, 10/07/2013    Influenza Virus Vaccine 10/02/2014, 10/20/2015    Influenza Whole 09/01/2010    Influenza, AFLURIA (age 1 yrs+), FLUZONE, (age 10 mo+), MDV, 0.5mL 10/04/2022    Influenza, FLUAD, (age 72 y+), Adjuvanted, 0.5mL 09/14/2020    Influenza, FLUARIX, FLULAVAL, FLUZONE (age 10 mo+) AND AFLURIA, (age 1 y+), PF, 0.5mL 11/02/2019, 10/08/2021    Influenza, High Dose (Fluzone 65 yrs and older) 11/10/2016, 10/19/2017, 10/08/2018    Pneumococcal Conjugate 13-valent (Apvnsfr43) 06/15/2016    Pneumococcal Polysaccharide ([id number]) 01/01/2009, 09/11/2017    Zoster Recombinant (Shingrix) 05/20/2022       Active Problems:  Patient Active Problem List   Diagnosis Code    Chronic pain of left knee M25.562, G89.29    Type 2 diabetes mellitus with diabetic polyneuropathy, with long-term current use of insulin ([facility]) E11.42, Z79.4    COPD (chronic obstructive pulmonary disease) J44.9    Nonintractable migraine, unspecified migraine type G43.009    Coronary artery disease due to lipid rich plaque I25.10, I25.83    DDD (degenerative disc disease), lumbar M51.36    Chronic, continuous use of opioids F11.90    DDD (degenerative disc disease), cervical M50.30    Osteoarthritis of cervical spine M47.812    GERD (gastroesophageal reflux disease) K21.9    Essential hypertension I10    Mixed hyperlipidemia E78.2    Insomnia G47.00    Lumbosacral spondylosis without myelopathy M47.817    Sacroiliitis, not elsewhere classified [facility] M46.1    Carpal tunnel syndrome G56.00    Mixed stress and urge urinary incontinence N39.46    Intractable migraine with aura without status migrainosus G43.119    Anxiety and depression F41.9, F32. A    Chronic migraine without aura without status migrainosus, not intractable G43.709    Morbid (severe) obesity with alveolar hypoventilation ([facility]) E66.2    Chronic nausea R11.0    Lung nodule R91.1    Neuropathy G62.9    Obstructive sleep apnea syndrome G47.33    Asthma with COPD ([facility]) J44.9    Obesity, Class III, BMI 40-49.9 (morbid obesity) ([facility]) E66.01    Stage 3 chronic kidney disease ([facility]) N18.30    Benign hypertension with CKD (chronic kidney disease) stage III ([facility]) I12.9, N18.30    Secondary diabetes mellitus with stage 3 chronic kidney disease (GFR 30-59) ([facility]) E13.22, N18.30    CKD (chronic kidney disease) stage 3, GFR 30-59 ml/min N18.30    Lumbar radiculopathy, chronic M54.16    Sessile colonic polyp K63.5    Morbid obesity ([facility]) E66.01    Adenomatous polyp of ascending colon D12.2    Irritable bowel syndrome with both constipation and diarrhea K58.2    Abdominal bloating R14.0    Major depressive disorder, single episode, unspecified F32.9    Permanent atrial fibrillation ([facility]) I48.21    Polyneuropathy, unspecified G62.9    Atypical chest pain R07.89    Left ventricular diastolic dysfunction J93.8    Transaminasemia R74.01    Acute respiratory failure with hypoxia and hypercarbia ([facility]) J96.01, J96.02    Macrocytosis D75.89    Morbid obesity with BMI of 40.0-44.9, adult ([facility]) E66.01, Z68.41    Chronic diastolic congestive heart failure ([facility]) I50.32    Oxygen dependent Z99.81    Chronic bilateral low back pain with bilateral sciatica M54.42, M54.41, G89.29    Left lower lobe pneumonia J18.9    Prolonged Q-T interval on ECG R94.31    Chronic respiratory failure with hypoxia ([facility]) J96.11    Pneumonia J18.9    Chest pain R07.9    Leg cramping R25.2    Leg cramp R25.2    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    RUE numbness R20.0       Isolation/Infection:   Isolation            No Isolation          Patient Infection Status       Infection Onset Added Last Indicated Last Indicated By Review Planned Expiration Resolved Resolved By    None active    Resolved    COVID-19 (Rule Out) 11/01/22 11/01/22 11/01/22 COVID-19, Rapid (Ordered)   11/01/22 Rule-Out Test Resulted    COVID-19 (Rule Out) 07/25/22 07/25/22 07/25/22 COVID-19 & Influenza Combo (Ordered)   07/25/22 Rule-Out Test Resulted    COVID-19 (Rule Out) 02/01/22 02/01/22 02/01/22 COVID-19, Rapid (Ordered)   02/01/22 Rule-Out Test Resulted    COVID-19 (Rule Out) 02/26/21 02/27/21 02/26/21 COVID-19 (Ordered)   02/28/21 Rule-Out Test Resulted    COVID-19 (Rule Out) 07/01/20 07/01/20 07/01/20 COVID-19 (Ordered)   07/01/20 Rule-Out Test Resulted            Nurse Assessment:  Last Vital Signs: /62   Pulse 85   Temp 98.2 °F (36.8 °C)   Resp 17   Ht 5' 2\" (1.575 m)   Wt 220 lb (99.8 kg)   LMP  (LMP Unknown)   SpO2 94%   BMI 40.24 kg/m²     Last documented pain score (0-10 scale): Pain Level: 10  Last Weight:   Wt Readings from Last 1 Encounters:   11/09/22 220 lb (99.8 kg)     Mental Status:  oriented and alert    IV Access:  - None    Nursing Mobility/ADLs:  Walking   Assisted  Transfer  Assisted  Bathing  Assisted  Dressing  Assisted  Toileting  Assisted  Feeding  Independent  Med Admin  Independent  Med Delivery   none    Wound Care Documentation and Therapy:        Elimination:  Continence: Bowel: Yes  Bladder: No  Urinary Catheter: None   Colostomy/Ileostomy/Ileal Conduit: No       Date of Last BM: 11/10/22  No intake or output data in the 24 hours ending 11/11/22 0847  No intake/output data recorded. Safety Concerns:      At Risk for Falls    Impairments/Disabilities:      None    Nutrition Therapy:  Current Nutrition Therapy:   - Oral Diet:  General    Routes of Feeding: Oral  Liquids: No Restrictions  Daily Fluid Restriction: no  Last Modified Barium Swallow with Video (Video Swallowing Test): not done    Treatments at the Time of Hospital Discharge:   Respiratory Treatments: daily inhaler, nightly CPaP  Oxygen Therapy:  is on oxygen at 3 L/min per nasal cannula. Ventilator:    - No ventilator support    Rehab Therapies: Physical Therapy and Occupational Therapy  Weight Bearing Status/Restrictions: No weight bearing restrictions  Other Medical Equipment (for information only, NOT a DME order):  na  Other Treatments: na    Patient's personal belongings (please select all that are sent with patient):  None    RN SIGNATURE:  Electronically signed by Monica Richey RN on 11/11/22 at 11:01 AM EST    CASE MANAGEMENT/SOCIAL WORK SECTION        Readmission Risk Assessment Score:  Readmission Risk              Risk of Unplanned Readmission:  0           Discharging to Facility/ Agency   Name: [address] [place] [address]   Address:  [facility]:[phone]  Fax:    Dialysis Facility (if applicable)   Name:  Address:  Dialysis Schedule:  Phone:  Fax:    / signature: Electronically signed by Adeola Gutiérrez RN on 11/11/22 at 9:16 AM EST    PHYSICIAN SECTION    Prognosis: Fair    Condition at Discharge: Stable    Rehab Potential (if transferring to Rehab): Fair    Recommended Labs or Other Treatments After Discharge: none    Physician Certification: I certify the above information and transfer of Armen Tom  is necessary for the continuing treatment of the diagnosis listed and that she requires Home Care for greater 30 days.      Update Admission H&P: No change in H&P    PHYSICIAN SIGNATURE:  Electronically signed by Shanel Martinez MD on 11/11/22 at 8:59 AM EST

## 2022-11-11 NOTE — PROGRESS NOTES
OBS/CDU   RESIDENT NOTE      Patients PCP is:  VERONIKA Stanton CNP        SUBJECTIVE      Patient states that she is having improvement with her right leg. No acute events overnight. Has been able to tolerate a full diet without nausea or vomiting. The patient is urinating on his own and is passing flatus. Denies fever, chills, nausea, vomiting, chest pain, shortness of breath, abdominal pain, focal weakness, numbness, tingling, urinary/bowel symptoms, vision changes, visual hallucinations, or headache. PHYSICAL EXAM      General: NAD, AO X 3  Heent: EMOI, PERRL  Neck: SUPPLE, NO JVD  Cardiovascular: RRR, S1S2  Pulmonary: CTAB, NO SOB  Abdomen: SOFT, NTTP, ND, +BS  Extremities: +2/4 PULSES DISTAL, NO SWELLING  Neuro / Psych: NO NUMBNESS OR TINGLING, MENTATION AT BASELINE    PERTINENT TEST /EXAMS      I have reviewed all available laboratory results.     MEDICATIONS CURRENT   cyclobenzaprine (FLEXERIL) tablet 10 mg, BID PRN  insulin lispro (HUMALOG) injection vial 0-8 Units, TID WC  insulin lispro (HUMALOG) injection vial 0-4 Units, Nightly  glucose chewable tablet 16 g, PRN  dextrose bolus 10% 125 mL, PRN   Or  dextrose bolus 10% 250 mL, PRN  glucagon (rDNA) injection 1 mg, PRN  dextrose 10 % infusion, Continuous PRN  albuterol (PROVENTIL) nebulizer solution 2.5 mg, Q6H PRN  atorvastatin (LIPITOR) tablet 40 mg, Daily  budesonide-formoterol (SYMBICORT) 160-4.5 MCG/ACT inhaler 2 puff, BID  carvedilol (COREG) tablet 3.125 mg, BID  clopidogrel (PLAVIX) tablet 75 mg, Daily  escitalopram (LEXAPRO) tablet 10 mg, Daily  fenofibrate (TRIGLIDE) tablet 160 mg, Daily  ferrous sulfate (FE TABS 325) EC tablet 325 mg, Daily with breakfast  furosemide (LASIX) tablet 20 mg, Daily  insulin glargine (LANTUS) injection vial 55 Units, BID  isosorbide dinitrate (ISORDIL) tablet 30 mg, Daily  losartan (COZAAR) tablet 25 mg, Daily  melatonin tablet 10 mg, Nightly  metFORMIN (GLUCOPHAGE) tablet 1,000 mg, BID WC  oxybutynin (DITROPAN-XL) extended release tablet 5 mg, Daily  oxyCODONE-acetaminophen (PERCOCET) 5-325 MG per tablet 1 tablet, Q8H PRN  pantoprazole (PROTONIX) tablet 40 mg, BID AC  aspirin EC tablet 81 mg, Daily  topiramate (TOPAMAX) tablet 100 mg, Nightly  sodium chloride flush 0.9 % injection 5-40 mL, 2 times per day  sodium chloride flush 0.9 % injection 5-40 mL, PRN  0.9 % sodium chloride infusion, PRN  enoxaparin (LOVENOX) injection 40 mg, Daily  acetaminophen (TYLENOL) tablet 650 mg, Q4H PRN  ondansetron (ZOFRAN-ODT) disintegrating tablet 4 mg, Q8H PRN   Or  ondansetron (ZOFRAN) injection 4 mg, Q6H PRN        All medication charted and reviewed. CONSULTS      IP CONSULT TO STROKE TEAM    ASSESSMENT/PLAN       Kevin Gutierrez is a 66 y.o. female who presents with right leg pain with difficult ambulation. 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 Although all attempts are made to edit the dictation for accuracy, there may be errors in the transcription that are not intended.

## 2022-11-11 NOTE — PROGRESS NOTES
CLINICAL PHARMACY NOTE: MEDS TO BEDS    Total # of Prescriptions Filled: 1   The following medications were delivered to the patient:  FLEXERIL 10    Additional Documentation:     PT WILL TAKE PERCOCET RX WITH THEM

## 2022-11-15 ENCOUNTER — TELEPHONE (OUTPATIENT)
Dept: PAIN MANAGEMENT | Age: 72
End: 2022-11-15

## 2022-11-15 NOTE — TELEPHONE ENCOUNTER
Pt calls the office letting us know she was just discharged from the hospital, scheduled her for next available appt. 12/6. Pt did get a 5 day script from another provider and was asking if it was ok to fill this.

## 2022-11-16 DIAGNOSIS — E11.42 TYPE 2 DIABETES MELLITUS WITH DIABETIC POLYNEUROPATHY, WITH LONG-TERM CURRENT USE OF INSULIN (HCC): Chronic | ICD-10-CM

## 2022-11-16 DIAGNOSIS — E78.2 MIXED HYPERLIPIDEMIA: ICD-10-CM

## 2022-11-16 DIAGNOSIS — Z79.4 TYPE 2 DIABETES MELLITUS WITH DIABETIC POLYNEUROPATHY, WITH LONG-TERM CURRENT USE OF INSULIN (HCC): Chronic | ICD-10-CM

## 2022-11-17 ENCOUNTER — TELEPHONE (OUTPATIENT)
Dept: PRIMARY CARE CLINIC | Age: 72
End: 2022-11-17

## 2022-11-17 DIAGNOSIS — M50.30 DDD (DEGENERATIVE DISC DISEASE), CERVICAL: Chronic | ICD-10-CM

## 2022-11-17 DIAGNOSIS — M46.1 SACROILIITIS, NOT ELSEWHERE CLASSIFIED (HCC): Chronic | ICD-10-CM

## 2022-11-17 DIAGNOSIS — M54.16 LUMBAR RADICULOPATHY, CHRONIC: ICD-10-CM

## 2022-11-17 DIAGNOSIS — M47.817 LUMBOSACRAL SPONDYLOSIS WITHOUT MYELOPATHY: Chronic | ICD-10-CM

## 2022-11-17 RX ORDER — OXYCODONE HYDROCHLORIDE AND ACETAMINOPHEN 5; 325 MG/1; MG/1
1 TABLET ORAL EVERY 8 HOURS PRN
Qty: 90 TABLET | Refills: 0 | Status: SHIPPED | OUTPATIENT
Start: 2022-11-17 | End: 2022-12-17

## 2022-11-17 RX ORDER — FENOFIBRATE 160 MG/1
160 TABLET ORAL DAILY
Qty: 90 TABLET | Refills: 1 | Status: SHIPPED | OUTPATIENT
Start: 2022-11-17

## 2022-11-17 NOTE — TELEPHONE ENCOUNTER
[facility] with [place] [facility] called in stating pt had her evaluation done today and will be seen 2 x a week for 2 weeks.

## 2022-11-28 ENCOUNTER — TELEPHONE (OUTPATIENT)
Dept: PRIMARY CARE CLINIC | Age: 72
End: 2022-11-28

## 2022-11-28 RX ORDER — CYCLOBENZAPRINE HCL 10 MG
10 TABLET ORAL 2 TIMES DAILY PRN
Qty: 20 TABLET | Refills: 0 | Status: SHIPPED | OUTPATIENT
Start: 2022-11-28 | End: 2022-12-08

## 2022-11-28 NOTE — TELEPHONE ENCOUNTER
Notify [facility] that I will provide a short-term refill, however routine use this medication is highly advised in the elderly as there increased risk of respiratory depression.   She must schedule hospital follow-up missed appointment last week and we should discuss alternative options, such as physical therapy

## 2022-11-28 NOTE — TELEPHONE ENCOUNTER
Patient called requesting a refill of cyclobenzaprine it was given to her in the hospital early November and she is out. It's the only thing that helps with the pain.

## 2022-12-05 ENCOUNTER — TELEPHONE (OUTPATIENT)
Dept: PRIMARY CARE CLINIC | Age: 72
End: 2022-12-05

## 2022-12-05 DIAGNOSIS — J44.1 COPD EXACERBATION (HCC): Primary | ICD-10-CM

## 2022-12-05 NOTE — TELEPHONE ENCOUNTER
Please verify what pulse ox readings have been running over the last week, with COPD it is not uncommon for patients that are on 88 to 90% but wishes to know patient's trend. If she having any increased confusion, any other cold symptoms?

## 2022-12-05 NOTE — TELEPHONE ENCOUNTER
Patient homehealth called into office reporting a fall this weekend using [facility] did arrive Patient home and assessed her.  Also reports pulse 100-103 pulse ox 87-90 while using 2.5 liters of oxygen     Call back [phone]

## 2022-12-06 RX ORDER — AZITHROMYCIN 250 MG/1
250 TABLET, FILM COATED ORAL SEE ADMIN INSTRUCTIONS
Qty: 6 TABLET | Refills: 0 | Status: SHIPPED | OUTPATIENT
Start: 2022-12-06 | End: 2022-12-11

## 2022-12-06 NOTE — TELEPHONE ENCOUNTER
I spoke to the patient, she did not have the exact readings but she states she has been using her oxygen more often. Pt states she is coughing a lot- clear drainage. I tried to call the home care nurse back but the phone rings busy.

## 2022-12-07 NOTE — TELEPHONE ENCOUNTER
Please notify the patient that I am sending an oral antibiotic, azithromycin, for COPD exacerbation.   She should report to walk-in clinic or ER if she is having any worsening breathing symptoms if no same-day appointments available

## 2022-12-09 ENCOUNTER — TELEPHONE (OUTPATIENT)
Dept: OTHER | Facility: CLINIC | Age: 72
End: 2022-12-09

## 2022-12-09 ENCOUNTER — HOSPITAL ENCOUNTER (EMERGENCY)
Age: 72
Discharge: HOME | End: 2022-12-09
Attending: EMERGENCY MEDICINE
Payer: COMMERCIAL

## 2022-12-09 ENCOUNTER — APPOINTMENT (OUTPATIENT)
Dept: CT IMAGING | Age: 72
End: 2022-12-09
Payer: COMMERCIAL

## 2022-12-09 VITALS
HEART RATE: 83 BPM | RESPIRATION RATE: 19 BRPM | BODY MASS INDEX: 40.48 KG/M2 | WEIGHT: 220 LBS | HEIGHT: 62 IN | DIASTOLIC BLOOD PRESSURE: 59 MMHG | SYSTOLIC BLOOD PRESSURE: 126 MMHG | OXYGEN SATURATION: 94 % | TEMPERATURE: 97.9 F

## 2022-12-09 DIAGNOSIS — N30.01 ACUTE CYSTITIS WITH HEMATURIA: Primary | ICD-10-CM

## 2022-12-09 DIAGNOSIS — R31.9 HEMATURIA, UNSPECIFIED TYPE: ICD-10-CM

## 2022-12-09 DIAGNOSIS — N20.0 KIDNEY STONE: ICD-10-CM

## 2022-12-09 LAB
ABSOLUTE EOS #: 0.1 K/UL (ref 0–0.4)
ABSOLUTE LYMPH #: 1.6 K/UL (ref 1–4.8)
ABSOLUTE MONO #: 0.5 K/UL (ref 0.1–1.3)
ALBUMIN SERPL-MCNC: 4 G/DL (ref 3.5–5.2)
ALP BLD-CCNC: 67 U/L (ref 35–104)
ALT SERPL-CCNC: 23 U/L (ref 5–33)
ANION GAP SERPL CALCULATED.3IONS-SCNC: 8 MMOL/L (ref 9–17)
AST SERPL-CCNC: 33 U/L
BACTERIA: ABNORMAL
BASOPHILS # BLD: 1 % (ref 0–2)
BASOPHILS ABSOLUTE: 0.1 K/UL (ref 0–0.2)
BILIRUB SERPL-MCNC: 0.2 MG/DL (ref 0.3–1.2)
BILIRUBIN URINE: NEGATIVE
BUN BLDV-MCNC: 18 MG/DL (ref 8–23)
CALCIUM SERPL-MCNC: 8.6 MG/DL (ref 8.6–10.4)
CASTS UA: ABNORMAL /LPF
CHLORIDE BLD-SCNC: 96 MMOL/L (ref 98–107)
CO2: 29 MMOL/L (ref 20–31)
COLOR: ABNORMAL
CREAT SERPL-MCNC: 1.24 MG/DL (ref 0.5–0.9)
EOSINOPHILS RELATIVE PERCENT: 3 % (ref 0–4)
EPITHELIAL CELLS UA: ABNORMAL /HPF
GFR SERPL CREATININE-BSD FRML MDRD: 46 ML/MIN/1.73M2
GLUCOSE BLD-MCNC: 424 MG/DL (ref 70–99)
GLUCOSE URINE: ABNORMAL
HCT VFR BLD CALC: 41.2 % (ref 36–46)
HEMOGLOBIN: 13.5 G/DL (ref 12–16)
KETONES, URINE: NEGATIVE
LACTIC ACID: 2 MMOL/L (ref 0.5–2.2)
LEUKOCYTE ESTERASE, URINE: ABNORMAL
LIPASE: 94 U/L (ref 13–60)
LYMPHOCYTES # BLD: 30 % (ref 24–44)
MCH RBC QN AUTO: 30 PG (ref 26–34)
MCHC RBC AUTO-ENTMCNC: 32.7 G/DL (ref 31–37)
MCV RBC AUTO: 91.8 FL (ref 80–100)
MONOCYTES # BLD: 9 % (ref 1–7)
NITRITE, URINE: NEGATIVE
PDW BLD-RTO: 13.6 % (ref 11.5–14.9)
PH UA: 6 (ref 5–8)
PLATELET # BLD: 205 K/UL (ref 150–450)
PMV BLD AUTO: 9.7 FL (ref 6–12)
POTASSIUM SERPL-SCNC: 4.3 MMOL/L (ref 3.7–5.3)
PROTEIN UA: ABNORMAL
RBC # BLD: 4.49 M/UL (ref 4–5.2)
RBC UA: ABNORMAL /HPF
SEG NEUTROPHILS: 57 % (ref 36–66)
SEGMENTED NEUTROPHILS ABSOLUTE COUNT: 3 K/UL (ref 1.3–9.1)
SODIUM BLD-SCNC: 133 MMOL/L (ref 135–144)
SPECIFIC GRAVITY UA: 1.03 (ref 1–1.03)
TOTAL PROTEIN: 7.1 G/DL (ref 6.4–8.3)
TURBIDITY: CLEAR
URINE HGB: ABNORMAL
UROBILINOGEN, URINE: NORMAL
WBC # BLD: 5.3 K/UL (ref 3.5–11)
WBC UA: ABNORMAL /HPF

## 2022-12-09 PROCEDURE — 2580000003 HC RX 258: Performed by: STUDENT IN AN ORGANIZED HEALTH CARE EDUCATION/TRAINING PROGRAM

## 2022-12-09 PROCEDURE — 96374 THER/PROPH/DIAG INJ IV PUSH: CPT

## 2022-12-09 PROCEDURE — 83690 ASSAY OF LIPASE: CPT

## 2022-12-09 PROCEDURE — 74176 CT ABD & PELVIS W/O CONTRAST: CPT

## 2022-12-09 PROCEDURE — 6370000000 HC RX 637 (ALT 250 FOR IP): Performed by: STUDENT IN AN ORGANIZED HEALTH CARE EDUCATION/TRAINING PROGRAM

## 2022-12-09 PROCEDURE — 85025 COMPLETE CBC W/AUTO DIFF WBC: CPT

## 2022-12-09 PROCEDURE — 6370000000 HC RX 637 (ALT 250 FOR IP): Performed by: EMERGENCY MEDICINE

## 2022-12-09 PROCEDURE — 80053 COMPREHEN METABOLIC PANEL: CPT

## 2022-12-09 PROCEDURE — 6360000002 HC RX W HCPCS: Performed by: STUDENT IN AN ORGANIZED HEALTH CARE EDUCATION/TRAINING PROGRAM

## 2022-12-09 PROCEDURE — 36415 COLL VENOUS BLD VENIPUNCTURE: CPT

## 2022-12-09 PROCEDURE — 81001 URINALYSIS AUTO W/SCOPE: CPT

## 2022-12-09 PROCEDURE — 87086 URINE CULTURE/COLONY COUNT: CPT

## 2022-12-09 PROCEDURE — 99284 EMERGENCY DEPT VISIT MOD MDM: CPT

## 2022-12-09 PROCEDURE — 83605 ASSAY OF LACTIC ACID: CPT

## 2022-12-09 RX ORDER — SODIUM CHLORIDE, SODIUM LACTATE, POTASSIUM CHLORIDE, AND CALCIUM CHLORIDE .6; .31; .03; .02 G/100ML; G/100ML; G/100ML; G/100ML
1000 INJECTION, SOLUTION INTRAVENOUS ONCE
Status: COMPLETED | OUTPATIENT
Start: 2022-12-09 | End: 2022-12-09

## 2022-12-09 RX ORDER — FENTANYL CITRATE 0.05 MG/ML
75 INJECTION, SOLUTION INTRAMUSCULAR; INTRAVENOUS ONCE
Status: COMPLETED | OUTPATIENT
Start: 2022-12-09 | End: 2022-12-09

## 2022-12-09 RX ORDER — SODIUM CHLORIDE, SODIUM LACTATE, POTASSIUM CHLORIDE, AND CALCIUM CHLORIDE .6; .31; .03; .02 G/100ML; G/100ML; G/100ML; G/100ML
1000 INJECTION, SOLUTION INTRAVENOUS ONCE
Status: DISCONTINUED | OUTPATIENT
Start: 2022-12-09 | End: 2022-12-10 | Stop reason: HOSPADM

## 2022-12-09 RX ORDER — CEPHALEXIN 250 MG/1
500 CAPSULE ORAL ONCE
Status: COMPLETED | OUTPATIENT
Start: 2022-12-09 | End: 2022-12-09

## 2022-12-09 RX ORDER — ACETAMINOPHEN 500 MG
1000 TABLET ORAL ONCE
Status: COMPLETED | OUTPATIENT
Start: 2022-12-09 | End: 2022-12-09

## 2022-12-09 RX ORDER — CEPHALEXIN 500 MG/1
500 CAPSULE ORAL 4 TIMES DAILY
Qty: 20 CAPSULE | Refills: 0 | Status: SHIPPED | OUTPATIENT
Start: 2022-12-09 | End: 2022-12-14

## 2022-12-09 RX ADMIN — CEPHALEXIN 500 MG: 250 CAPSULE ORAL at 21:19

## 2022-12-09 RX ADMIN — FENTANYL CITRATE 75 MCG: 0.05 INJECTION, SOLUTION INTRAMUSCULAR; INTRAVENOUS at 18:46

## 2022-12-09 RX ADMIN — ACETAMINOPHEN 1000 MG: 500 TABLET, FILM COATED ORAL at 18:30

## 2022-12-09 RX ADMIN — SODIUM CHLORIDE, POTASSIUM CHLORIDE, SODIUM LACTATE AND CALCIUM CHLORIDE 1000 ML: 600; 310; 30; 20 INJECTION, SOLUTION INTRAVENOUS at 18:45

## 2022-12-09 ASSESSMENT — PAIN DESCRIPTION - LOCATION
LOCATION: ABDOMEN;BACK
LOCATION: ABDOMEN

## 2022-12-09 ASSESSMENT — PAIN SCALES - GENERAL
PAINLEVEL_OUTOF10: 3

## 2022-12-09 ASSESSMENT — PAIN DESCRIPTION - DESCRIPTORS: DESCRIPTORS: CRAMPING

## 2022-12-09 ASSESSMENT — PAIN - FUNCTIONAL ASSESSMENT: PAIN_FUNCTIONAL_ASSESSMENT: 0-10

## 2022-12-09 ASSESSMENT — PAIN DESCRIPTION - FREQUENCY: FREQUENCY: INTERMITTENT

## 2022-12-09 NOTE — ED PROVIDER NOTES
[address]  Emergency Department Encounter  EmergencyMedicine Resident     Pt Name:Mariangel Tobar  MRN: [id number]  [place] 1950  Date of evaluation: 12/9/22  PCP:  VERONIKA Puckett CNP    This patient was evaluated in the Emergency Department for symptoms described in the history of present illness. The patient was evaluated in the context of the global COVID-19 pandemic, which necessitated consideration that the patient might be at risk for infection with the SARS-CoV-2 virus that causes COVID-19. Institutional protocols and algorithms that pertain to the evaluation of patients at risk for COVID-19 are in a state of rapid change based on information released by regulatory bodies including the CDC and federal and state organizations. These policies and algorithms were followed during the patient's care in the ED. CHIEF COMPLAINT       Chief Complaint   Patient presents with    Hematuria     Pt reports that she \"woke up this morning and after going to the bathroom saw blood in the toilet\"    Abdominal Pain     Pt reports \"cramping\"       HISTORY OF PRESENT ILLNESS  (Location/Symptom, Timing/Onset, Context/Setting, Quality, Duration, Modifying Factors, Severity.)      Marita Elias is a 66 y.o. female who presents with hematuria. Patient states that she woke up this morning, use the restroom and saw blood. Patient states that she has since had abdominal pain, suprapubic, states that she has had cramping symptoms. Patient has not had any blood in her bowel movements, she is tolerating p.o. intake. She is not currently feeling nauseous.   Patient has history of type 2 diabetes,    PAST MEDICAL / SURGICAL / SOCIAL / FAMILY HISTORY      has a past medical history of Abdominal bloating, Adenomatous polyp of ascending colon, Allergic rhinitis, Anxiety, Arthritis, Asthma, Atrial fibrillation ([address] Back pain, Benign hypertension with CKD (chronic kidney disease) stage III ([address] CAD (coronary artery disease), Caffeine use, CHF (congestive heart failure) ([facility] Chronic kidney disease, CKD (chronic kidney disease) stage 3, GFR 30-59 ml/min ([facility]), COPD (chronic obstructive pulmonary disease) ([facility] Degeneration of lumbar or lumbosacral intervertebral disc, Depression, DM (diabetes mellitus) ([facility] Emphysema of lung ([facility] Gastritis, GERD (gastroesophageal reflux disease), Hearing loss, Hematuria, Hiatal hernia, HTN (hypertension), benign, Hypertriglyceridemia, Incontinence of urine, Irritable bowel syndrome with both constipation and diarrhea, Knee arthropathy, Lumbosacral spondylosis without myelopathy, Migraine headache, Mumps, Neuropathy, Obesity, On home oxygen therapy, HIGINIO on CPAP, Otitis media, Secondary diabetes mellitus with stage 3 chronic kidney disease (GFR 30-59) ([facility] Stroke ([facility] Tinnitus, Type 2 diabetes mellitus without complication ([facility] Type II or unspecified type diabetes mellitus without mention of complication, not stated as uncontrolled, UTI (lower urinary tract infection), and Varicose vein. has a past surgical history that includes Cholecystectomy (2007); Carpal tunnel release (Right); Tympanoplasty; Dilation & curettage (1979); Cystocopy (9/25/2013); Cataract removal with implant (Bilateral); Tonsillectomy; Incontinence surgery; ablation of dysrhythmic focus (2000); Upper gastrointestinal endoscopy (03/2016); eye surgery; Tubal ligation; other surgical history (09/10/15); Insertable Cardiac Monitor (12/04/2015); Tympanoplasty; Colonoscopy; Colonoscopy (04/10/2017); pr colsc flx w/removal lesion by hot bx forceps (N/A, 4/10/2017); Tympanostomy tube placement (08/21/2017); Upper gastrointestinal endoscopy (N/A, 3/2/2021); and Colonoscopy (N/A, 3/2/2021).       Social History     Socioeconomic History    Marital status: Legally      Spouse name: Not on file    Number of children: Not on file    Years of education: Not on file    Highest education level: Not on file   Occupational History    Occupation: disabled     Employer: N/A   Tobacco Use    Smoking status: Former     Packs/day: 3.00     Years: 41.00     Pack years: 123.00     Types: Cigarettes     Quit date: 2000     Years since quittin.9    Smokeless tobacco: Never    Tobacco comments:     quit in    Vaping Use    Vaping Use: Never used   Substance and Sexual Activity    Alcohol use: No     Alcohol/week: 0.0 standard drinks    Drug use: No    Sexual activity: Not Currently   Other Topics Concern    Not on file   Social History Narrative    Not on file     Social Determinants of Health     Financial Resource Strain: Low Risk     Difficulty of Paying Living Expenses: Not hard at all   Food Insecurity: No Food Insecurity    Worried About Running Out of Food in the Last Year: Never true    Ran Out of Food in the Last Year: Never true   Transportation Needs: Not on file   Physical Activity: Not on file   Stress: Not on file   Social Connections: Not on file   Intimate Partner Violence: Not on file   Housing Stability: Not on file       Family History   Problem Relation Age of Onset    Diabetes Mother     Heart Disease Mother     Stomach Cancer Father     Diabetes Maternal Grandmother         also aunts and uncles (maternal)    Emphysema Sister        Allergies:  Robitussin [guaifenesin], Codeine, Compazine [prochlorperazine maleate], Iodides, Morphine, Moxifloxacin, Reglan [metoclopramide], Avelox [moxifloxacin hcl in nacl], Cipro xr, Sulfa antibiotics, and Zofran [ondansetron hcl]    Home Medications:  Prior to Admission medications    Medication Sig Start Date End Date Taking?  Authorizing Provider   cephALEXin (KEFLEX) 500 MG capsule Take 1 capsule by mouth 4 times daily for 5 days 22 Yes Cristine Arias MD   azithromycin (ZITHROMAX) 250 MG tablet Take 1 tablet by mouth See Admin Instructions for 5 days 500mg on day 1 followed by 250mg on days 2 - 5 22  Noemy Huynh VERONIKA Pimentel CNP   metFORMIN (GLUCOPHAGE) 1000 MG tablet TAKE 1 TABLET BY MOUTH 2 TIMES DAILY (WITH MEALS) 11/17/22 12/17/22  VERONIKA Parnell CNP   fenofibrate (TRIGLIDE) 160 MG tablet TAKE 1 TABLET BY MOUTH DAILY 11/17/22   VERONIKA Parnell CNP   oxyCODONE-acetaminophen (PERCOCET) 5-325 MG per tablet Take 1 tablet by mouth every 8 hours as needed for Pain for up to 30 days. Intended supply: 30 days. 11/17/22 12/17/22  VERONIKA Tejeda CNP   TRULICITY 1.5 SR/2.7AG SC injection INJECT ONE AND A HALF (1 & 1/2) MG INTO THE SKIN ONCE A WEEK STOP 0.75 10/31/22   VERONIKA Parnell CNP   escitalopram (LEXAPRO) 10 MG tablet Take 1 tablet by mouth daily 10/13/22 1/11/23  VERONIKA Parnell CNP   furosemide (LASIX) 20 MG tablet TAKE 1 TABLET BY MOUTH ONCE DAILY AS NEEDED 10/13/22   VERONIKA Parnell CNP   oxybutynin (DITROPAN-XL) 5 MG extended release tablet TAKE 1 TABLET BY MOUTH DAILY 9/26/22   VERONIKA Parnell CNP   budesonide-formoterol (SYMBICORT) 160-4.5 MCG/ACT AERO Inhale 2 puffs into the lungs in the morning and 2 puffs before bedtime.  As needed for sob. 7/27/22   Svetlana Sage MD   blood glucose test strips (TRUE METRIX BLOOD GLUCOSE TEST) strip THREE TIMES A DAY 7/27/22   Svetlana Sage MD   vitamin B-12 (CYANOCOBALAMIN) 100 MCG tablet take half a tablet BY MOUTH ONCE DAILY 7/27/22   Svetlana Sage MD   Melatonin 10 MG TABS Take 10 mg by mouth nightly 7/27/22   Svetlana Sage MD   dicyclomine (BENTYL) 10 MG capsule TAKE 1 CAPSULE BY MOUTH TWICE A DAY AS NEEDED FOR ABDOMINAL SPASMS 7/27/22   Svetlana Sage MD    MG capsule TAKE 1 CAPSULE BY MOUTH TWICE A DAY 7/18/22   VERONIKA Parnell CNP   SM ASPIRIN ADULT LOW STRENGTH 81 MG EC tablet TAKE 1 TABLET BY MOUTH ONCE DAILY 7/18/22   VERONIKA Parnell CNP   pantoprazole (PROTONIX) 40 MG tablet TAKE 1 TABLET BY MOUTH TWICE A DAY 7/18/22   VERONIKA Parnlel CNP   isosorbide dinitrate (ISORDIL) 30 MG tablet TAKE 1 TABLET BY MOUTH ONCE DAILY 7/18/22   Eran Veliz APRN - CNP   FEROSUL 325 (65 Fe) MG tablet TAKE 1 TAB BY MOUTH IN THE MORNING 6/20/22   VERONIKA Snowden - CNP   clopidogrel (PLAVIX) 75 MG tablet TAKE 1 TAB BY MOUTH ONCE A DAY ( IN THE MORNING ) -THIS MEDICINE MAY BE TAKENWITH OR WITHOUT FOOD 5/25/22   VERONIKA Snowden CNP   insulin aspart (NOVOLOG FLEXPEN) 100 UNIT/ML injection pen IF<139 NO INSULIN; 140-199-2 UN;200-249-4 UN;250-299-6 NN;479-312-0 UN;350-400=10 UN;ABOVE 400-12 UNIT(S) 3/30/22   Kimmy Gu MD   magnesium oxide (MAG-OX) 400 MG tablet  2/21/22   Historical Provider, MD   insulin glargine (BASAGLAR KWIKPEN) 100 UNIT/ML injection pen Inject 55 Units into the skin 2 times daily 3/1/22   Kimmy Gu MD   losartan (COZAAR) 25 MG tablet TAKE 1 TABLET BY MOUTH ONCE DAILY 2/21/22   Kimmy Gu MD   carvedilol (COREG) 3.125 MG tablet TAKE 1 TAB BY MOUTH TWICE A DAY ( IN THE MORNING AND BEDTIME ) 1/21/22   Kimmy Gu MD   Multiple Vitamins-Minerals (CERTAVITE/ANTIOXIDANTS) TABS TAKE 1 TABLET BY MOUTH ONCE DAILY 12/8/21   Kimmy Gu MD   topiramate (TOPAMAX) 100 MG tablet TAKE 1 TABLET BY MOUTH NIGHTLY  10/25/21   Gema Bricsoe MD   atorvastatin (LIPITOR) 40 MG tablet Take 1 tablet by mouth daily 2/28/21   Kimmy Gu MD   albuterol (PROVENTIL) (2.5 MG/3ML) 0.083% nebulizer solution Take 3 mLs by nebulization every 6 hours as needed for Wheezing 11/3/20   Espinoza Alonso MD   OXYGEN Inhale 3 L into the lungs     Historical Provider, MD       REVIEW OF SYSTEMS    (2-9 systems for level 4, 10 or more for level 5)      Review of Systems   Constitutional:  Negative for chills, fatigue and fever. HENT:  Negative for congestion and trouble swallowing. Eyes:  Negative for visual disturbance. Respiratory:  Negative for cough, chest tightness and shortness of breath. Gastrointestinal:  Positive for abdominal pain.  Negative for diarrhea, nausea and vomiting. Endocrine: Negative for polyuria. Genitourinary:  Positive for dysuria and hematuria. Negative for flank pain and urgency. Musculoskeletal:  Negative for back pain and myalgias. Skin:  Negative for color change. Allergic/Immunologic: Negative for immunocompromised state. Neurological:  Negative for dizziness, syncope, weakness, light-headedness and headaches. PHYSICAL EXAM   (up to 7 for level 4, 8 or more for level 5)      INITIAL VITALS:   BP (!) 126/59   Pulse 83   Temp 97.9 °F (36.6 °C) (Oral)   Resp 19   Ht 5' 2\" (1.575 m)   Wt 220 lb (99.8 kg)   LMP  (LMP Unknown)   SpO2 94%   BMI 40.24 kg/m²     Physical Exam  Constitutional:       General: She is not in acute distress. Appearance: Normal appearance. She is not ill-appearing or toxic-appearing. HENT:      Head: Normocephalic and atraumatic. Nose: No congestion. Mouth/Throat:      Mouth: Mucous membranes are moist.   Eyes:      Conjunctiva/sclera: Conjunctivae normal.   Cardiovascular:      Rate and Rhythm: Normal rate and regular rhythm. Pulses: Normal pulses. Heart sounds: Normal heart sounds. No murmur heard. No gallop. Pulmonary:      Effort: Pulmonary effort is normal. No respiratory distress. Breath sounds: Normal breath sounds. No stridor. No wheezing or rhonchi. Chest:      Chest wall: No tenderness. Abdominal:      General: Abdomen is flat. There is no distension. Palpations: There is no mass. Tenderness: There is abdominal tenderness in the suprapubic area. There is no right CVA tenderness, left CVA tenderness, guarding or rebound. Musculoskeletal:         General: No swelling, tenderness or deformity. Skin:     General: Skin is warm. Coloration: Skin is not jaundiced. Findings: No bruising. Neurological:      General: No focal deficit present. Mental Status: She is alert.        DIFFERENTIAL  DIAGNOSIS     PLAN (Angela Ramirez / IMAGING / EKG):  Orders Placed This Encounter   Procedures    Culture, Urine    CT ABDOMEN PELVIS WO CONTRAST Additional Contrast? None    CBC with Auto Differential    Comprehensive Metabolic Panel w/ Reflex to MG    Lipase    Urinalysis with Microscopic    Lactic Acid       MEDICATIONS ORDERED:  Orders Placed This Encounter   Medications    lactated ringers bolus    acetaminophen (TYLENOL) tablet 1,000 mg    fentaNYL (SUBLIMAZE) injection 75 mcg    DISCONTD: lactated ringers bolus    cephALEXin (KEFLEX) capsule 500 mg     Order Specific Question:   Antimicrobial Indications     Answer:   Urinary Tract Infection    cephALEXin (KEFLEX) 500 MG capsule     Sig: Take 1 capsule by mouth 4 times daily for 5 days     Dispense:  20 capsule     Refill:  0           DIAGNOSTIC RESULTS / EMERGENCY DEPARTMENT COURSE / MDM   LAB RESULTS:  Results for orders placed or performed during the hospital encounter of 12/09/22   CBC with Auto Differential   Result Value Ref Range    WBC 5.3 3.5 - 11.0 k/uL    RBC 4.49 4.0 - 5.2 m/uL    Hemoglobin 13.5 12.0 - 16.0 g/dL    Hematocrit 41.2 36 - 46 %    MCV 91.8 80 - 100 fL    MCH 30.0 26 - 34 pg    MCHC 32.7 31 - 37 g/dL    RDW 13.6 11.5 - 14.9 %    Platelets 191 691 - 188 k/uL    MPV 9.7 6.0 - 12.0 fL    Seg Neutrophils 57 36 - 66 %    Lymphocytes 30 24 - 44 %    Monocytes 9 (H) 1 - 7 %    Eosinophils % 3 0 - 4 %    Basophils 1 0 - 2 %    Segs Absolute 3.00 1.3 - 9.1 k/uL    Absolute Lymph # 1.60 1.0 - 4.8 k/uL    Absolute Mono # 0.50 0.1 - 1.3 k/uL    Absolute Eos # 0.10 0.0 - 0.4 k/uL    Basophils Absolute 0.10 0.0 - 0.2 k/uL   Comprehensive Metabolic Panel w/ Reflex to MG   Result Value Ref Range    Glucose 424 (HH) 70 - 99 mg/dL    BUN 18 8 - 23 mg/dL    Creatinine 1.24 (H) 0.50 - 0.90 mg/dL    Est, Glom Filt Rate 46 (L) >60 mL/min/1.73m2    Calcium 8.6 8.6 - 10.4 mg/dL    Sodium 133 (L) 135 - 144 mmol/L    Potassium 4.3 3.7 - 5.3 mmol/L    Chloride 96 (L) 98 - 107 mmol/L    CO2 29 20 - 31 mmol/L    Anion Gap 8 (L) 9 - 17 mmol/L    Alkaline Phosphatase 67 35 - 104 U/L    ALT 23 5 - 33 U/L    AST 33 (H) <32 U/L    Total Bilirubin 0.2 (L) 0.3 - 1.2 mg/dL    Total Protein 7.1 6.4 - 8.3 g/dL    Albumin 4.0 3.5 - 5.2 g/dL   Lipase   Result Value Ref Range    Lipase 94 (H) 13 - 60 U/L   Urinalysis with Microscopic   Result Value Ref Range    Color, UA Orange (A) Yellow    Turbidity UA Clear Clear    Glucose, Ur LARGE (A) NEGATIVE    Bilirubin Urine NEGATIVE NEGATIVE    Ketones, Urine NEGATIVE NEGATIVE    Specific Gravity, UA 1.033 (H) 1.000 - 1.030    Urine Hgb LARGE (A) NEGATIVE    pH, UA 6.0 5.0 - 8.0    Protein, UA TRACE (A) NEGATIVE    Urobilinogen, Urine Normal Normal    Nitrite, Urine NEGATIVE NEGATIVE    Leukocyte Esterase, Urine SMALL (A) NEGATIVE    WBC, UA 6 TO 9 /HPF    RBC, UA TOO NUMEROUS TO COUNT /HPF    Casts UA 0 TO 2 /LPF    Epithelial Cells UA 3 to 5 /HPF    Bacteria, UA FEW (A) None   Lactic Acid   Result Value Ref Range    Lactic Acid 2.0 0.5 - 2.2 mmol/L         RADIOLOGY:  CT ABDOMEN PELVIS WO CONTRAST Additional Contrast? None    Result Date: 12/9/2022  No acute finding within the abdomen or pelvis. Two stable tiny punctate nonobstructing right renal calculi. Stable 8 mm right middle lobe pulmonary nodule. Cholecystectomy. EMERGENCY DEPARTMENT COURSE:  ED Course as of 12/10/22 0057   Fri Dec 09, 2022   1806 Abdominal pain,blood in urine this AM  [KK]   2123 Complaining of urinary symptoms. Given 2 L of fluid, will plan to discharge with Keflex and outpatient follow-up. Patient likely passed a kidney stone given presence of nonobstructing calculi on CT, 1 episode of hematuria this morning with pain. [KK]      ED Course User Index  301 Warren Crespo DO           Assessment/Plan: 27-year-old female, symptoms consistent with passded kidney stone.   Hemodynamically stable afebrile, low suspicion for pyelonephritis however patient describes symptoms consistent with UTI so we will discharge with Keflex. CT consistent with nonobstructing calculi. Not actively having any CVA tenderness on exam today. Hemodynamically stable appropriate for discharge and outpatient follow-up. ER return precautions given to the patient, follow-up instructions given the patient. FINAL IMPRESSION      1. Acute cystitis with hematuria    2. Kidney stone    3.  Hematuria, unspecified type          DISPOSITION / PLAN     DISPOSITION Decision To Discharge 12/09/2022 09:04:33 PM      PATIENT REFERRED TO:  Ailin Menjivar, APRN - CNP  [address] [facility] [address] [facility]    In 3 days      Jun Whitley MD  [address] [facility] [address] [place] [address]    In 1 week      [facility] ED  [facility] [address]  [address] [facility]  [phone]    If symptoms worsen    DISCHARGE MEDICATIONS:  Discharge Medication List as of 12/9/2022  9:06 PM        START taking these medications    Details   cephALEXin (KEFLEX) 500 MG capsule Take 1 capsule by mouth 4 times daily for 5 days, Disp-20 capsule, R-0Normal             Kamron Caldwell DO  Emergency Medicine Resident    (Please note that portions of thisnote were completed with a voice recognition program.  Efforts were made to edit the dictations but occasionally words are mis-transcribed.)       Gabbie Oh DO  Resident  12/10/22 8720

## 2022-12-10 LAB
CULTURE: NORMAL
SPECIMEN DESCRIPTION: NORMAL

## 2022-12-10 ASSESSMENT — ENCOUNTER SYMPTOMS
TROUBLE SWALLOWING: 0
COUGH: 0
CHEST TIGHTNESS: 0
BACK PAIN: 0
DIARRHEA: 0
VOMITING: 0
COLOR CHANGE: 0
NAUSEA: 0
ABDOMINAL PAIN: 1
SHORTNESS OF BREATH: 0

## 2022-12-10 NOTE — ED PROVIDER NOTES
[address]  eMERGENCY dEPARTMENT eNCOUnter   Attending Attestation     Pt Name: Yasir Alvarez  MRN: [id number]  [place] 1950  Date of evaluation: 12/9/22    History, EXAM, MDM:    Yasir Alvarez is a 66 y.o. female who presents with Hematuria (Pt reports that she \"woke up this morning and after going to the bathroom saw blood in the toilet\") and Abdominal Pain (Pt reports \"cramping\")  Pt presents with hematuria, lower abdominal pain, dysuria. Initial hypotension, improved after IVF. She was hypoxic on presentation but she states that she wears 2.5L O2 chronically. CT scan shows kidney stones, but no obstructing stones. UA shows some mild bacteriuria. Starting on a course of antibiotics. D/w pt the results, treatment plan, warning precautions for prompt ED return and importance of close OP FU, she verbalizes understanding and agrees with the treatment plan. Vitals:   Vitals:    12/09/22 1750 12/09/22 1850 12/09/22 1903   BP: (!) 90/52 126/87    Pulse: 92  79   Resp: 16  14   Temp: 97.9 °F (36.6 °C)     TempSrc: Oral     SpO2: 90% 94% 92%   Weight: 220 lb (99.8 kg)     Height: 5' 2\" (1.575 m)       I performed a history and physical examination of the patient and discussed management with the resident. I reviewed the residents note and agree with the documented findings and plan of care. Any areas of disagreement are noted on the chart. I was personally present for the key portions of any procedures. I have documented in the chart those procedures where I was not present during the key portions. I have personally reviewed all images and agree with the resident's interpretation. I have reviewed the emergency nurses triage note. I agree with the chief complaint, past medical history, past surgical history, allergies, medications, social and family history as documented unless otherwise noted below.  Documentation of the HPI, Physical Exam and Medical Decision Making performed by medical students or scribes is based on my personal performance of the HPI, PE and MDM. For Phys Assistant/ Nurse Practitioner cases/documentation I have had a face to face evaluation of this patient and have completed at least one if not all key elements of the E/M (history, physical exam, and MDM). Additional findings are as noted.     Dulce Pantoja MD  Attending Emergency  Physician                Dulce Pantoja MD  12/10/22 0572

## 2022-12-10 NOTE — TELEPHONE ENCOUNTER
Writer contacted Dr. Renee Bryant to inform of 30 day readmission risk. Amer's attempt to contact Dr. Renee Bryant was unsuccessful.      Call Back: If you need to call back to inform of disposition you can contact me at [phone]

## 2022-12-13 ENCOUNTER — HOSPITAL ENCOUNTER (OUTPATIENT)
Dept: PAIN MANAGEMENT | Age: 72
Discharge: HOME | End: 2022-12-13
Payer: COMMERCIAL

## 2022-12-13 VITALS
DIASTOLIC BLOOD PRESSURE: 76 MMHG | HEIGHT: 62 IN | HEART RATE: 111 BPM | BODY MASS INDEX: 40.48 KG/M2 | WEIGHT: 220 LBS | RESPIRATION RATE: 18 BRPM | SYSTOLIC BLOOD PRESSURE: 121 MMHG

## 2022-12-13 DIAGNOSIS — M50.30 DDD (DEGENERATIVE DISC DISEASE), CERVICAL: Chronic | ICD-10-CM

## 2022-12-13 DIAGNOSIS — Z79.891 CHRONIC USE OF OPIATE FOR THERAPEUTIC PURPOSE: ICD-10-CM

## 2022-12-13 DIAGNOSIS — M47.817 LUMBOSACRAL SPONDYLOSIS WITHOUT MYELOPATHY: Primary | Chronic | ICD-10-CM

## 2022-12-13 DIAGNOSIS — E66.01 OBESITY, CLASS III, BMI 40-49.9 (MORBID OBESITY) (HCC): ICD-10-CM

## 2022-12-13 PROCEDURE — 99213 OFFICE O/P EST LOW 20 MIN: CPT

## 2022-12-13 PROCEDURE — 99214 OFFICE O/P EST MOD 30 MIN: CPT | Performed by: STUDENT IN AN ORGANIZED HEALTH CARE EDUCATION/TRAINING PROGRAM

## 2022-12-13 RX ORDER — OXYCODONE HYDROCHLORIDE AND ACETAMINOPHEN 5; 325 MG/1; MG/1
1 TABLET ORAL EVERY 8 HOURS PRN
Qty: 90 TABLET | Refills: 0 | Status: SHIPPED | OUTPATIENT
Start: 2022-12-18 | End: 2023-01-17

## 2022-12-13 NOTE — PROGRESS NOTES
Chronic Pain Clinic Note     Encounter Date: 12/13/2022     SUBJECTIVE:  Chief Complaint   Patient presents with    Back Pain       History of Present Illness:   Ritesh Santoyo is a 66 y.o. female who presents with back pain    Medication Refill: percocet 15    Current Complaints of Pain:   Location: L back, gluteal   Radiation: b/l legs to feet  Severity: moderate   Pain Numerical Score - 8  Average: 8     Highest: 10  Lowest: 5  Character/Quality: Complains of pain that is aching  Timing: Intermittent  Associated symptoms: headaches  Numbness: no  Weakness: no  Exacerbating factors: bending, sitting, standing, twisting, position  Alleviating factors: nothing in particular  Length of time pain has been present: Started years ago  Inciting event/injury: no  Bowel/Bladder incontinence: no  Falls: no  Physical Therapy: yes with very little relief     History of Interventions:   Surgery: No previous lumbar/cervical surgeries  Injections: None    Imaging:    No recent imaging    Past Medical History:   Diagnosis Date    Abdominal bloating 1/26/2021    Adenomatous polyp of ascending colon 1/26/2021    Allergic rhinitis     Anxiety 7/17/2013    Arthritis     Asthma     Atrial fibrillation (HCC)     Back pain     NERVE/DR. GRACIA    Benign hypertension with CKD (chronic kidney disease) stage III (HCC)     CAD (coronary artery disease) 3/21/2013    Caffeine use     2 coffee/day    CHF (congestive heart failure) (HCC)     Chronic kidney disease     CKD (chronic kidney disease) stage 3, GFR 30-59 ml/min (HCC)     COPD (chronic obstructive pulmonary disease) (HCC)     emphysema    Degeneration of lumbar or lumbosacral intervertebral disc     Depression     DM (diabetes mellitus) ([facility] [address]     Emphysema of lung (HCC)     Gastritis     GERD (gastroesophageal reflux disease)     Hearing loss     Hematuria     Hiatal hernia     HTN (hypertension), benign 6/28/2014    Hypertriglyceridemia     Incontinence of urine 9/25/2013 Irritable bowel syndrome with both constipation and diarrhea 1/26/2021    Knee arthropathy     Lumbosacral spondylosis without myelopathy 9/29/2014    Migraine headache     DR. GRACIA    [place]     Neuropathy     Obesity     On home oxygen therapy     2 L PER NC  HS AND PRN    HIGINIO on CPAP     Otitis media 1-26-15    Secondary diabetes mellitus with stage 3 chronic kidney disease (GFR 30-59) (HCC)     Stroke (HCC)      mini stroke. Tinnitus     Type 2 diabetes mellitus without complication (HCC)     Type II or unspecified type diabetes mellitus without mention of complication, not stated as uncontrolled     UTI (lower urinary tract infection)     Varicose vein        Past Surgical History:   Procedure Laterality Date    ABLATION OF DYSRHYTHMIC FOCUS  2000    CARPAL TUNNEL RELEASE Right     CATARACT REMOVAL WITH IMPLANT Bilateral     CHOLECYSTECTOMY  2007    COLONOSCOPY      COLONOSCOPY  04/10/2017    tubular adenomo polyp , mod. sigmoid diverticulosis, min. int. hemorrhoids    COLONOSCOPY N/A 3/2/2021    COLONOSCOPY WITH BIOPSY performed by Rudy Ballard MD at [address] [place] [address]  9/25/2013    DILATION AND CURETTAGE  1979    EYE SURGERY      laser    INCONTINENCE SURGERY      Percutaneous nerve stimulation Dr Alejandro Carvalho Nov 2013     INSERTABLE CARDIAC MONITOR  12/04/2015    [employer] REVEAL LINQ MODEL #[id number] MRI CONDTIONAL OK 3T.  IMMEDIATELY POST IMPLANT    OTHER SURGICAL HISTORY  09/10/15    removal of interstim    MI COLSC FLX W/REMOVAL LESION BY HOT BX FORCEPS N/A 4/10/2017    COLONOSCOPY POLYPECTOMY HOT BIOPSY performed by Dirk Alarcon MD at [place] [id number]  08/21/2017    UPPER GASTROINTESTINAL ENDOSCOPY  03/2016    Dr Farhan Hartmann N/A 3/2/2021    EGD BIOPSY performed by Rudy Ballard MD at [place] [id number]       Family History   Problem Relation Age of Onset    Diabetes Mother     Heart Disease Mother     Stomach Cancer Father     Diabetes Maternal Grandmother         also aunts and uncles (maternal)    Emphysema Sister        Social History     Socioeconomic History    Marital status: Legally      Spouse name: Not on file    Number of children: Not on file    Years of education: Not on file    Highest education level: Not on file   Occupational History    Occupation: disabled     Employer: N/A   Tobacco Use    Smoking status: Former     Packs/day: 3.00     Years: 41.00     Pack years: 123.00     Types: Cigarettes     Quit date: 2000     Years since quittin.9    Smokeless tobacco: Never    Tobacco comments:     quit in    Vaping Use    Vaping Use: Never used   Substance and Sexual Activity    Alcohol use: No     Alcohol/week: 0.0 standard drinks    Drug use: No    Sexual activity: Not Currently   Other Topics Concern    Not on file   Social History Narrative    Not on file     Social Determinants of Health     Financial Resource Strain: Low Risk     Difficulty of Paying Living Expenses: Not hard at all   Food Insecurity: No Food Insecurity    Worried About Running Out of Food in the Last Year: Never true    Ran Out of Food in the Last Year: Never true   Transportation Needs: Not on file   Physical Activity: Not on file   Stress: Not on file   Social Connections: Not on file   Intimate Partner Violence: Not on file   Housing Stability: Not on file       Medications & Allergies:   Current Outpatient Medications   Medication Instructions    albuterol (PROVENTIL) 2.5 mg, Nebulization, EVERY 6 HOURS PRN    atorvastatin (LIPITOR) 40 mg, Oral, DAILY    Basaglar KwikPen 55 Units, SubCUTAneous, 2 TIMES DAILY    blood glucose test strips (TRUE METRIX BLOOD GLUCOSE TEST) strip THREE TIMES A DAY     budesonide-formoterol (SYMBICORT) 160-4.5 MCG/ACT AERO 2 puffs, Inhalation, 2 TIMES DAILY, As needed for sob    carvedilol (COREG) 3.125 MG tablet TAKE 1 TAB BY MOUTH TWICE A DAY ( IN THE MORNING AND BEDTIME )    cephALEXin (KEFLEX) 500 mg, Oral, 4 TIMES DAILY    clopidogrel (PLAVIX) 75 MG tablet TAKE 1 TAB BY MOUTH ONCE A DAY ( IN THE MORNING ) -THIS MEDICINE MAY BE TAKENWITH OR WITHOUT FOOD    dicyclomine (BENTYL) 10 MG capsule TAKE 1 CAPSULE BY MOUTH TWICE A DAY AS NEEDED FOR ABDOMINAL SPASMS     MG capsule TAKE 1 CAPSULE BY MOUTH TWICE A DAY    escitalopram (LEXAPRO) 10 mg, Oral, DAILY    fenofibrate (TRIGLIDE) 160 mg, Oral, DAILY    FEROSUL 325 (65 Fe) MG tablet TAKE 1 TAB BY MOUTH IN THE MORNING    furosemide (LASIX) 20 MG tablet TAKE 1 TABLET BY MOUTH ONCE DAILY AS NEEDED    insulin aspart (NOVOLOG FLEXPEN) 100 UNIT/ML injection pen IF<139 NO INSULIN; 140-199-2 UN;200-249-4 UN;250-299-6 UN;300-349-8 UN;350-400=10 UN;ABOVE 400-12 UNIT(S)    isosorbide dinitrate (ISORDIL) 30 MG tablet TAKE 1 TABLET BY MOUTH ONCE DAILY    losartan (COZAAR) 25 MG tablet TAKE 1 TABLET BY MOUTH ONCE DAILY    magnesium oxide (MAG-OX) 400 MG tablet No dose, route, or frequency recorded. Melatonin 10 mg, Oral, NIGHTLY    metFORMIN (GLUCOPHAGE) 1,000 mg, Oral, 2 TIMES DAILY WITH MEALS    Multiple Vitamins-Minerals (CERTAVITE/ANTIOXIDANTS) TABS TAKE 1 TABLET BY MOUTH ONCE DAILY    oxybutynin (DITROPAN-XL) 5 mg, Oral, DAILY    [START ON 12/18/2022] oxyCODONE-acetaminophen (PERCOCET) 5-325 MG per tablet 1 tablet, Oral, EVERY 8 HOURS PRN, Intended supply: 30 days.     OXYGEN 3 L, Inhalation    pantoprazole (PROTONIX) 40 MG tablet TAKE 1 TABLET BY MOUTH TWICE A DAY    SM ASPIRIN ADULT LOW STRENGTH 81 MG EC tablet TAKE 1 TABLET BY MOUTH ONCE DAILY    topiramate (TOPAMAX) 100 mg, Oral, NIGHTLY    TRULICITY 1.5 EX/2.0MI SC injection INJECT ONE AND A HALF (1 & 1/2) MG INTO THE SKIN ONCE A WEEK STOP 0.75    vitamin B-12 (CYANOCOBALAMIN) 100 MCG tablet take half a tablet BY MOUTH ONCE DAILY       Allergies   Allergen Reactions    Robitussin [Guaifenesin] Anaphylaxis    Codeine Swelling    Compazine [Prochlorperazine Maleate] Hives and Swelling    Iodides Itching    Morphine Other (See Comments)     hallucinations    Moxifloxacin      Other reaction(s): Intolerance-unknown  Other reaction(s): Intolerance-unknown    Reglan [Metoclopramide] Nausea Only    Avelox [Moxifloxacin Hcl In Nacl] Rash     Dermatitis      Cipro Xr Rash     dermatitis    Sulfa Antibiotics Rash    Zofran [Ondansetron Hcl] Nausea And Vomiting       Review of Systems:   Constitutional: negative for weight changes or fevers  Cardiovascular: negative for chest pain, palpitations, irregular heart beat  Respiratory: negative for dyspnea, cough, wheezing  Gastrointestinal: negative for constipation, diarrhea, nausea  Genitourinary: negative for bowel/bladder incontinence   Musculoskeletal: positive for low back pain  Neurological: negative for radicular leg pain, leg weakness or numbness/tingling  Behavioral/Psych: negative for anxiety/depression   Hematological: negative for abnormal bleeding, anticoagulation use or antiplatelet use  All other systems reviewed and are negative    OBJECTIVE:    Vitals:    12/13/22 1002   BP: 121/76   Pulse: (!) 111   Resp: 18       PHYSICAL EXAM    GENERAL: No acute distress, pleasant, well-appearing  HEENT: Normocephalic, atraumatic, Pupils equal and round  CARDIOVASCULAR: Well perfused, No peripheral cyanosis  PULMONARY: Good chest wall excursion, breathing unlabored  PSYCH: Appropriate affect and insight, non-pressured speech  SKIN: No rashes or lesions  MUSCULOSKELETAL:  Inspection: The back and extremities are symmetric and aligned. Muscle bulk is normal in appearance.   Palpation: There is tenderness to palpation along the lumbar paraspinal musculature bilaterally  Lumbar range of motion is full  NEUROMUSCULAR:  Patient ambulates with Rollator  Gait is antalgic  Sensation to light touch is intact in lower extremities  Strength is full in lower extremities  No ankle clonus    DIAGNOSIS:    ICD-10-CM    1. Lumbosacral spondylosis without myelopathy  M47.817 oxyCODONE-acetaminophen (PERCOCET) 5-325 MG per tablet      2. DDD (degenerative disc disease), cervical  M50.30 oxyCODONE-acetaminophen (PERCOCET) 5-325 MG per tablet      3. Obesity, Class III, BMI 40-49.9 (morbid obesity) ([facility])  E66.01       4. Chronic use of opiate for therapeutic purpose  Z79.891            ASSESSMENT:    Krystina Baker is a 66 y. o.female who presents with chronic low back pain and opioid management visit. To review, patient has history of COPD and obesity. The patient's history and physical examination are consistent with lumbosacral spondylosis. Patient was recently hospitalized for kidney stones and she does have a consultation with urology in the near future. She wants to restart her physical therapy after her most recent hospitalization. Neurologically, it appears the patient has full strength and normal sensation. There is no evidence of radiculopathy or myelopathy on examination. There are no red flags in the patient's history. The patient continues to take opioid medications to improve pain, function and quality of life. The patient denies any side effects from the medications including constipation or respiratory depression. The patient reports adequate analgesia with the medication. There is no evidence of aberrant behavior. PLAN:  Medications:     For nonopioid therapy, the following medications were prescribed:    -Continue medication prescribed by primary care physician    Opioid therapy:  -Continue Percocet 5/325 mg 3 times daily as needed, refilled  -Pain Treatment agreement: Up to date  -Urine Drug Screen: 2/7/2022, reviewed and appropriate  -OARRS reviewed and appropriate    Interventions:  -Offered interventional spine procedure, patient deferred    Imaging:  -No new imaging    Behavioral Therapies:  -Continue daily stretching and home exercise program    Referrals:  -Patient waiting to start back up with therapy after hospitalization    Follow-up Plan:  -1 month    Patient was offered intervention where appropriate. Multi-modal Pain Therapy: The patient was explicitly considered for multimodal and interdisciplinary therapy. Non-opioid and non-pharmacological opportunities to enhance analgesia and quality of life have been and will continue to be pursued. Opioid Therapy: Education provided to patient regarding short term and long term implications of opioid medication use. Repeat opioid risk stratification today, discussion regarding functional achievements, safe storage, and optimization of non-opioid interventional, behavioral, and pharmaceutical modalities. Will continue attempt to wean off medication as appropriate. Jamari Boo DO  Interventional Pain Management/PM&R   [facility]    30 minutes was utilized for this patient encounter including face-to-face time with patient, reviewing previous imaging, injections, and medical history while reviewing plan of care with patient which included injection therapy, physical therapy, and medication management. Orders Placed This Encounter    oxyCODONE-acetaminophen (PERCOCET) 5-325 MG per tablet     Sig: Take 1 tablet by mouth every 8 hours as needed for Pain for up to 30 days. Intended supply: 30 days.      Dispense:  90 tablet     Refill:  0     Reduce doses taken as pain becomes manageable

## 2022-12-20 DIAGNOSIS — K59.01 SLOW TRANSIT CONSTIPATION: ICD-10-CM

## 2022-12-20 DIAGNOSIS — I25.83 CORONARY ARTERY DISEASE DUE TO LIPID RICH PLAQUE: ICD-10-CM

## 2022-12-20 DIAGNOSIS — I25.10 CORONARY ARTERY DISEASE DUE TO LIPID RICH PLAQUE: ICD-10-CM

## 2022-12-21 RX ORDER — CLOPIDOGREL BISULFATE 75 MG/1
TABLET ORAL
Qty: 90 TABLET | Refills: 1 | Status: SHIPPED | OUTPATIENT
Start: 2022-12-21

## 2022-12-21 RX ORDER — DOCUSATE SODIUM 100 MG/1
CAPSULE, LIQUID FILLED ORAL
Qty: 60 CAPSULE | Refills: 2 | Status: SHIPPED | OUTPATIENT
Start: 2022-12-21

## 2022-12-21 RX ORDER — PANTOPRAZOLE SODIUM 40 MG/1
TABLET, DELAYED RELEASE ORAL
Qty: 60 TABLET | Refills: 2 | Status: SHIPPED | OUTPATIENT
Start: 2022-12-21

## 2022-12-21 RX ORDER — ISOSORBIDE DINITRATE 30 MG/1
TABLET ORAL
Qty: 30 TABLET | Refills: 2 | Status: SHIPPED | OUTPATIENT
Start: 2022-12-21

## 2022-12-28 ENCOUNTER — HOSPITAL ENCOUNTER (EMERGENCY)
Age: 72
Discharge: HOME | End: 2022-12-28
Attending: EMERGENCY MEDICINE
Payer: COMMERCIAL

## 2022-12-28 ENCOUNTER — APPOINTMENT (OUTPATIENT)
Dept: CT IMAGING | Age: 72
End: 2022-12-28
Payer: COMMERCIAL

## 2022-12-28 VITALS
WEIGHT: 220 LBS | HEART RATE: 75 BPM | OXYGEN SATURATION: 93 % | DIASTOLIC BLOOD PRESSURE: 65 MMHG | HEIGHT: 63 IN | RESPIRATION RATE: 16 BRPM | BODY MASS INDEX: 38.98 KG/M2 | TEMPERATURE: 97 F | SYSTOLIC BLOOD PRESSURE: 120 MMHG

## 2022-12-28 DIAGNOSIS — R10.9 FLANK PAIN: Primary | ICD-10-CM

## 2022-12-28 DIAGNOSIS — R11.2 NAUSEA AND VOMITING, UNSPECIFIED VOMITING TYPE: ICD-10-CM

## 2022-12-28 LAB
ABSOLUTE EOS #: 0.1 K/UL (ref 0–0.4)
ABSOLUTE LYMPH #: 1.9 K/UL (ref 1–4.8)
ABSOLUTE MONO #: 0.5 K/UL (ref 0.1–1.3)
ALBUMIN SERPL-MCNC: 4 G/DL (ref 3.5–5.2)
ALP BLD-CCNC: 73 U/L (ref 35–104)
ALT SERPL-CCNC: 37 U/L (ref 5–33)
ANION GAP SERPL CALCULATED.3IONS-SCNC: 14 MMOL/L (ref 9–17)
AST SERPL-CCNC: 48 U/L
BASOPHILS # BLD: 1 % (ref 0–2)
BASOPHILS ABSOLUTE: 0.1 K/UL (ref 0–0.2)
BILIRUB SERPL-MCNC: 0.4 MG/DL (ref 0.3–1.2)
BUN BLDV-MCNC: 21 MG/DL (ref 8–23)
CALCIUM SERPL-MCNC: 9.4 MG/DL (ref 8.6–10.4)
CHLORIDE BLD-SCNC: 94 MMOL/L (ref 98–107)
CO2: 25 MMOL/L (ref 20–31)
CREAT SERPL-MCNC: 1.22 MG/DL (ref 0.5–0.9)
EOSINOPHILS RELATIVE PERCENT: 1 % (ref 0–4)
GFR SERPL CREATININE-BSD FRML MDRD: 47 ML/MIN/1.73M2
GLUCOSE BLD-MCNC: 341 MG/DL (ref 70–99)
HCT VFR BLD CALC: 45.2 % (ref 36–46)
HEMOGLOBIN: 15.4 G/DL (ref 12–16)
LIPASE: 67 U/L (ref 13–60)
LYMPHOCYTES # BLD: 25 % (ref 24–44)
MCH RBC QN AUTO: 30.7 PG (ref 26–34)
MCHC RBC AUTO-ENTMCNC: 34.1 G/DL (ref 31–37)
MCV RBC AUTO: 90 FL (ref 80–100)
MONOCYTES # BLD: 7 % (ref 1–7)
PDW BLD-RTO: 13.5 % (ref 11.5–14.9)
PLATELET # BLD: 187 K/UL (ref 150–450)
PMV BLD AUTO: 9.8 FL (ref 6–12)
POTASSIUM SERPL-SCNC: 4.7 MMOL/L (ref 3.7–5.3)
RBC # BLD: 5.02 M/UL (ref 4–5.2)
SEG NEUTROPHILS: 66 % (ref 36–66)
SEGMENTED NEUTROPHILS ABSOLUTE COUNT: 5 K/UL (ref 1.3–9.1)
SODIUM BLD-SCNC: 133 MMOL/L (ref 135–144)
THYROXINE, FREE: 1.04 NG/DL (ref 0.93–1.7)
TOTAL PROTEIN: 7.7 G/DL (ref 6.4–8.3)
TSH SERPL DL<=0.05 MIU/L-ACNC: 2.39 UIU/ML (ref 0.3–5)
WBC # BLD: 7.6 K/UL (ref 3.5–11)

## 2022-12-28 PROCEDURE — 99284 EMERGENCY DEPT VISIT MOD MDM: CPT

## 2022-12-28 PROCEDURE — 83690 ASSAY OF LIPASE: CPT

## 2022-12-28 PROCEDURE — 74176 CT ABD & PELVIS W/O CONTRAST: CPT

## 2022-12-28 PROCEDURE — 84443 ASSAY THYROID STIM HORMONE: CPT

## 2022-12-28 PROCEDURE — 84439 ASSAY OF FREE THYROXINE: CPT

## 2022-12-28 PROCEDURE — 85025 COMPLETE CBC W/AUTO DIFF WBC: CPT

## 2022-12-28 PROCEDURE — 36415 COLL VENOUS BLD VENIPUNCTURE: CPT

## 2022-12-28 PROCEDURE — 6370000000 HC RX 637 (ALT 250 FOR IP): Performed by: EMERGENCY MEDICINE

## 2022-12-28 PROCEDURE — 6360000002 HC RX W HCPCS: Performed by: EMERGENCY MEDICINE

## 2022-12-28 PROCEDURE — 96374 THER/PROPH/DIAG INJ IV PUSH: CPT

## 2022-12-28 PROCEDURE — 6360000002 HC RX W HCPCS

## 2022-12-28 PROCEDURE — 80053 COMPREHEN METABOLIC PANEL: CPT

## 2022-12-28 PROCEDURE — 2580000003 HC RX 258: Performed by: EMERGENCY MEDICINE

## 2022-12-28 RX ORDER — FENTANYL CITRATE 0.05 MG/ML
50 INJECTION, SOLUTION INTRAMUSCULAR; INTRAVENOUS ONCE
Status: DISCONTINUED | OUTPATIENT
Start: 2022-12-28 | End: 2022-12-28

## 2022-12-28 RX ORDER — OXYCODONE HYDROCHLORIDE AND ACETAMINOPHEN 5; 325 MG/1; MG/1
1 TABLET ORAL ONCE
Status: COMPLETED | OUTPATIENT
Start: 2022-12-28 | End: 2022-12-28

## 2022-12-28 RX ORDER — METOCLOPRAMIDE 10 MG/1
10 TABLET ORAL 4 TIMES DAILY
Qty: 20 TABLET | Refills: 0 | Status: SHIPPED | OUTPATIENT
Start: 2022-12-28

## 2022-12-28 RX ORDER — ESCITALOPRAM OXALATE 20 MG/1
20 TABLET ORAL DAILY
COMMUNITY

## 2022-12-28 RX ORDER — DIPHENHYDRAMINE HYDROCHLORIDE 50 MG/ML
INJECTION INTRAMUSCULAR; INTRAVENOUS
Status: COMPLETED
Start: 2022-12-28 | End: 2022-12-28

## 2022-12-28 RX ORDER — ONDANSETRON 2 MG/ML
4 INJECTION INTRAMUSCULAR; INTRAVENOUS ONCE
Status: COMPLETED | OUTPATIENT
Start: 2022-12-28 | End: 2022-12-28

## 2022-12-28 RX ORDER — 0.9 % SODIUM CHLORIDE 0.9 %
1000 INTRAVENOUS SOLUTION INTRAVENOUS ONCE
Status: COMPLETED | OUTPATIENT
Start: 2022-12-28 | End: 2022-12-28

## 2022-12-28 RX ORDER — CYCLOBENZAPRINE HCL 10 MG
10 TABLET ORAL 3 TIMES DAILY PRN
Qty: 21 TABLET | Refills: 0 | Status: SHIPPED | OUTPATIENT
Start: 2022-12-28 | End: 2023-01-07

## 2022-12-28 RX ADMIN — DIPHENHYDRAMINE HYDROCHLORIDE 25 MG: 50 INJECTION, SOLUTION INTRAMUSCULAR; INTRAVENOUS at 14:38

## 2022-12-28 RX ADMIN — ONDANSETRON 4 MG: 2 INJECTION INTRAMUSCULAR; INTRAVENOUS at 14:29

## 2022-12-28 RX ADMIN — OXYCODONE HYDROCHLORIDE AND ACETAMINOPHEN 1 TABLET: 5; 325 TABLET ORAL at 14:29

## 2022-12-28 RX ADMIN — SODIUM CHLORIDE 1000 ML: 9 INJECTION, SOLUTION INTRAVENOUS at 14:28

## 2022-12-28 ASSESSMENT — ENCOUNTER SYMPTOMS
EYE PAIN: 0
SORE THROAT: 0
ABDOMINAL PAIN: 1
COLOR CHANGE: 0
VOMITING: 1
BACK PAIN: 1
CONSTIPATION: 0
TROUBLE SWALLOWING: 0
FACIAL SWELLING: 0
DIARRHEA: 0
BLOOD IN STOOL: 0
WHEEZING: 0
CHEST TIGHTNESS: 0
SINUS PRESSURE: 0
RHINORRHEA: 0
EYE DISCHARGE: 0
NAUSEA: 1
SHORTNESS OF BREATH: 0
COUGH: 0
EYE REDNESS: 0

## 2022-12-28 ASSESSMENT — PAIN SCALES - GENERAL
PAINLEVEL_OUTOF10: 9
PAINLEVEL_OUTOF10: 9

## 2022-12-28 ASSESSMENT — LIFESTYLE VARIABLES
HOW OFTEN DO YOU HAVE A DRINK CONTAINING ALCOHOL: NEVER
HOW MANY STANDARD DRINKS CONTAINING ALCOHOL DO YOU HAVE ON A TYPICAL DAY: PATIENT DOES NOT DRINK

## 2022-12-28 ASSESSMENT — PAIN - FUNCTIONAL ASSESSMENT: PAIN_FUNCTIONAL_ASSESSMENT: 0-10

## 2022-12-28 NOTE — PROGRESS NOTES
Medication History completed:    New medications: none    Medications discontinued: none    Medications flagged for review:  Dicyclomine - not taking    Changes to dosing:   Magnesium oxide changed to 400 mg twice daily  Escitalopram changed to 20 mg daily    Stated allergies: As listed    Other pertinent information: Medications confirmed with Xiomara Miguel. The patient last filled Percocet on 12/19/22 for 30 days.      Thank you,  Dino Stinson, PharmD, BCPS  [phone]

## 2022-12-28 NOTE — ED NOTES
Mode of arrival (squad #, walk in, police, etc) : medic 13        Chief complaint(s): Flank Pain        Arrival Note (brief scenario, treatment PTA, etc). : Patient reports she was seen two weeks ago for kidney stones and sent home to follow up with urologist. Patient reports she doesn't know if she passed them. Reports ongoing flank pain and nausea. Patient rating pain 9/10. Patient alert and oriented x4 and respirations even non labored. C= \"Have you ever felt that you should Cut down on your drinking? \"  No  A= \"Have people Annoyed you by criticizing your drinking? \"  No  G= \"Have you ever felt bad or Guilty about your drinking? \"  No  E= \"Have you ever had a drink as an Eye-opener first thing in the morning to steady your nerves or to help a hangover? \"  No      Deferred []      Reason for deferring: N/A    *If yes to two or more: probable alcohol abuse. Kanchan Charlton RN  12/28/22 1137

## 2022-12-28 NOTE — ED PROVIDER NOTES
[facility]  eMERGENCY dEPARTMENT eNCOUnter      Pt Name: Checo Mariscal  MRN: [id number]  [place] 1950  Date of evaluation: 12/28/22      CHIEF COMPLAINT       Chief Complaint   Patient presents with    Flank Pain         HISTORY OF PRESENT ILLNESS    Checo Mariscal is a 67 y.o. female who presents complaining of flank pain. Patient states that for the last couple weeks now she has been having severe flank pain nausea and vomiting. Patient states that she was in the seen in the ER seem to have infection and some kidney stones. Patient states she has not been able to get follow-up with her doctors. Patient states the vomiting is nonstop the pain is severe and unrelenting. Patient states nothing makes it better or worse. Patient has had no diarrhea. Patient states the hematuria has stopped. Patient denies any fevers. REVIEW OF SYSTEMS       Review of Systems   Constitutional:  Negative for activity change, appetite change, chills, diaphoresis and fever. HENT:  Negative for congestion, ear pain, facial swelling, nosebleeds, rhinorrhea, sinus pressure, sore throat and trouble swallowing. Eyes:  Negative for pain, discharge and redness. Respiratory:  Negative for cough, chest tightness, shortness of breath and wheezing. Cardiovascular:  Negative for chest pain, palpitations and leg swelling. Gastrointestinal:  Positive for abdominal pain, nausea and vomiting. Negative for blood in stool, constipation and diarrhea. Genitourinary:  Positive for flank pain. Negative for difficulty urinating, dysuria, frequency, genital sores and hematuria. Musculoskeletal:  Positive for back pain. Negative for arthralgias, gait problem, joint swelling, myalgias and neck pain. Skin:  Negative for color change, pallor, rash and wound. Neurological:  Negative for dizziness, tremors, seizures, syncope, speech difficulty, weakness, numbness and headaches.    Psychiatric/Behavioral:  Negative for confusion, decreased concentration, hallucinations, self-injury, sleep disturbance and suicidal ideas. PAST MEDICAL HISTORY     Past Medical History:   Diagnosis Date    Abdominal bloating 1/26/2021    Adenomatous polyp of ascending colon 1/26/2021    Allergic rhinitis     Anxiety 7/17/2013    Arthritis     Asthma     Atrial fibrillation ([facility])     Back pain     NERVE/DR. GRACIA    Benign hypertension with CKD (chronic kidney disease) stage III ([facility])     CAD (coronary artery disease) 3/21/2013    Caffeine use     2 coffee/day    CHF (congestive heart failure) ([facility])     Chronic kidney disease     CKD (chronic kidney disease) stage 3, GFR 30-59 ml/min ([facility])     COPD (chronic obstructive pulmonary disease) ([facility])     emphysema    Degeneration of lumbar or lumbosacral intervertebral disc     Depression     DM (diabetes mellitus) ([address]     Emphysema of lung ([facility])     Gastritis     GERD (gastroesophageal reflux disease)     Hearing loss     Hematuria     Hiatal hernia     HTN (hypertension), benign 6/28/2014    Hypertriglyceridemia     Incontinence of urine 9/25/2013    Irritable bowel syndrome with both constipation and diarrhea 1/26/2021    Knee arthropathy     Lumbosacral spondylosis without myelopathy 9/29/2014    Migraine headache     DR. GRACIA    Mumps     Neuropathy     Obesity     On home oxygen therapy     2 L PER NC  HS AND PRN    HIGINIO on CPAP     Otitis media 1-26-15    Secondary diabetes mellitus with stage 3 chronic kidney disease (GFR 30-59) ([facility])     Stroke ([facility])      mini stroke.     Tinnitus     Type 2 diabetes mellitus without complication ([facility])     Type II or unspecified type diabetes mellitus without mention of complication, not stated as uncontrolled     UTI (lower urinary tract infection)     Varicose vein        SURGICAL HISTORY       Past Surgical History:   Procedure Laterality Date    ABLATION OF DYSRHYTHMIC FOCUS  2000    CARPAL TUNNEL RELEASE Right     CATARACT REMOVAL WITH IMPLANT Bilateral CHOLECYSTECTOMY  2007    COLONOSCOPY      COLONOSCOPY  04/10/2017    tubular adenomo polyp , mod. sigmoid diverticulosis, min. int. hemorrhoids    COLONOSCOPY N/A 3/2/2021    COLONOSCOPY WITH BIOPSY performed by Charleen Holman MD at [facility]  9/25/2013    DILATION AND CURETTAGE  1979    EYE SURGERY      laser    INCONTINENCE SURGERY      Percutaneous nerve stimulation Dr Frankie Runner Nov 2013     INSERTABLE CARDIAC MONITOR  12/04/2015    [employer] REVEAL LINQ MODEL #[id number] MRI CONDTIONAL OK 3T. IMMEDIATELY POST IMPLANT    OTHER SURGICAL HISTORY  09/10/15    removal of interstim    IA COLSC FLX W/REMOVAL LESION BY HOT BX FORCEPS N/A 4/10/2017    COLONOSCOPY POLYPECTOMY HOT BIOPSY performed by Chyna Anton MD at [facility]      TYMPANOSTOMY TUBE PLACEMENT  08/21/2017    UPPER GASTROINTESTINAL ENDOSCOPY  03/2016    Dr Darryn John N/A 3/2/2021    EGD BIOPSY performed by Charleen Holman MD at [facility]       Previous Medications    ALBUTEROL (PROVENTIL) (2.5 MG/3ML) 0.083% NEBULIZER SOLUTION    Take 3 mLs by nebulization every 6 hours as needed for Wheezing    ATORVASTATIN (LIPITOR) 40 MG TABLET    Take 1 tablet by mouth daily    BLOOD GLUCOSE TEST STRIPS (TRUE METRIX BLOOD GLUCOSE TEST) STRIP    THREE TIMES A DAY    BUDESONIDE-FORMOTEROL (SYMBICORT) 160-4.5 MCG/ACT AERO    Inhale 2 puffs into the lungs in the morning and 2 puffs before bedtime. As needed for sob.     CARVEDILOL (COREG) 3.125 MG TABLET    TAKE 1 TAB BY MOUTH TWICE A DAY ( IN THE MORNING AND BEDTIME )    CLOPIDOGREL (PLAVIX) 75 MG TABLET    TAKE 1 TAB BY MOUTH ONCE A DAY ( IN THE MORNING ) -THIS MEDICINE MAY BE TAKENWITH OR WITHOUT FOOD    DICYCLOMINE (BENTYL) 10 MG CAPSULE    TAKE 1 CAPSULE BY MOUTH TWICE A DAY AS NEEDED FOR ABDOMINAL SPASMS    DOCUSATE SODIUM (COLACE) 100 MG CAPSULE    TAKE 1 CAPSULE BY MOUTH TWICE A DAY    ESCITALOPRAM (LEXAPRO) 20 MG TABLET    Take 20 mg by mouth daily    FENOFIBRATE (TRIGLIDE) 160 MG TABLET    TAKE 1 TABLET BY MOUTH DAILY    FEROSUL 325 (65 FE) MG TABLET    TAKE 1 TAB BY MOUTH IN THE MORNING    FUROSEMIDE (LASIX) 20 MG TABLET    TAKE 1 TABLET BY MOUTH ONCE DAILY AS NEEDED    INSULIN ASPART (NOVOLOG FLEXPEN) 100 UNIT/ML INJECTION PEN    IF<139 NO INSULIN; 140-199-2 UN;200-249-4 UN;250-299-6 UN;300-349-8 UN;350-400=10 UN;ABOVE 400-12 UNIT(S)    INSULIN GLARGINE (BASAGLAR KWIKPEN) 100 UNIT/ML INJECTION PEN    Inject 55 Units into the skin 2 times daily    ISOSORBIDE DINITRATE (ISORDIL) 30 MG TABLET    TAKE 1 TABLET BY MOUTH ONCE DAILY    LOSARTAN (COZAAR) 25 MG TABLET    TAKE 1 TABLET BY MOUTH ONCE DAILY    MAGNESIUM OXIDE (MAG-OX) 400 MG TABLET    Take 400 mg by mouth 2 times daily    MELATONIN 10 MG TABS    Take 10 mg by mouth nightly    METFORMIN (GLUCOPHAGE) 1000 MG TABLET    TAKE 1 TABLET BY MOUTH 2 TIMES DAILY (WITH MEALS)    MULTIPLE VITAMINS-MINERALS (CERTAVITE/ANTIOXIDANTS) TABS    TAKE 1 TABLET BY MOUTH ONCE DAILY    OXYBUTYNIN (DITROPAN-XL) 5 MG EXTENDED RELEASE TABLET    TAKE 1 TABLET BY MOUTH DAILY    OXYCODONE-ACETAMINOPHEN (PERCOCET) 5-325 MG PER TABLET    Take 1 tablet by mouth every 8 hours as needed for Pain for up to 30 days. Intended supply: 30 days.     OXYGEN    Inhale 3 L into the lungs     PANTOPRAZOLE (PROTONIX) 40 MG TABLET    TAKE 1 TABLET BY MOUTH TWICE A DAY    SM ASPIRIN ADULT LOW STRENGTH 81 MG EC TABLET    TAKE 1 TABLET BY MOUTH ONCE DAILY    TOPIRAMATE (TOPAMAX) 100 MG TABLET    TAKE 1 TABLET BY MOUTH NIGHTLY     TRULICITY 1.5 GN/3.5AT SC INJECTION    INJECT ONE AND A HALF (1 & 1/2) MG INTO THE SKIN ONCE A WEEK STOP 0.75    VITAMIN B-12 (CYANOCOBALAMIN) 100 MCG TABLET    take half a tablet BY MOUTH ONCE DAILY       ALLERGIES     is allergic to robitussin [guaifenesin], codeine, compazine [prochlorperazine maleate], iodides, morphine, moxifloxacin, reglan [metoclopramide], avelox [moxifloxacin hcl in nacl], cipro xr, and sulfa antibiotics. FAMILY HISTORY     She indicated that her mother is . She indicated that her father is . She indicated that her sister is . She indicated that the status of her maternal grandmother is unknown. SOCIAL HISTORY      reports that she quit smoking about 23 years ago. Her smoking use included cigarettes. She has a 123.00 pack-year smoking history. She has never used smokeless tobacco. She reports that she does not drink alcohol and does not use drugs. PHYSICAL EXAM     INITIAL VITALS: /65   Pulse 75   Temp 97 °F (36.1 °C) (Oral)   Resp 16   Ht 5' 3\" (1.6 m)   Wt 220 lb (99.8 kg)   LMP  (LMP Unknown)   SpO2 93%   BMI 38.97 kg/m²      Physical Exam  Vitals and nursing note reviewed. Constitutional:       General: She is not in acute distress. Appearance: She is well-developed. She is not diaphoretic. HENT:      Head: Normocephalic and atraumatic. Eyes:      General: No scleral icterus. Right eye: No discharge. Left eye: No discharge. Conjunctiva/sclera: Conjunctivae normal.      Pupils: Pupils are equal, round, and reactive to light. Cardiovascular:      Rate and Rhythm: Normal rate and regular rhythm. Heart sounds: Normal heart sounds. No murmur heard. No friction rub. No gallop. Pulmonary:      Effort: Pulmonary effort is normal. No respiratory distress. Breath sounds: Normal breath sounds. No wheezing or rales. Chest:      Chest wall: No tenderness. Abdominal:      General: Bowel sounds are normal. There is no distension. Palpations: Abdomen is soft. There is no mass. Tenderness: There is no abdominal tenderness. There is no guarding or rebound. Musculoskeletal:         General: Tenderness (Bilateral flank) present. Normal range of motion. Skin:     General: Skin is warm and dry. Coloration: Skin is not pale. Findings: No erythema or rash. Neurological:      Mental Status: She is alert and oriented to person, place, and time. Cranial Nerves: No cranial nerve deficit. Sensory: No sensory deficit. Motor: No abnormal muscle tone. Coordination: Coordination normal.      Deep Tendon Reflexes: Reflexes normal.   Psychiatric:         Behavior: Behavior normal.         Thought Content: Thought content normal.         Judgment: Judgment normal.       MEDICAL DECISION MAKING:     Summary of Patient Presentation:        1)  Number and Complexity of Problems  Problem List This Visit:  Flank pain, vomiting    Differential Diagnosis:  Kidney stone, Shahab, lumbar pain    Diagnoses Considered but Do Not Suspect:  None    Pertinent Comorbid Conditions: Morbid obesity, chronic back issues    2)  Data Reviewed  My EKG interpretation:  None    Decision Rules/Scores utilized:  None    Tests considered but not ordered and why:  None    External Documents Reviewed:  None    Imaging that is independently reviewed and interpreted by me as:  None    See more data below for the lab and radiology tests and orders. 3)  Treatment and Disposition    MIPS:  None    Social determinants of health impacting treatment or disposition:  None    Shared Decision Making:  None    Code Status Discussion:  None    \"ED Course\" Notes From Epic Narrator:  ED Course as of 12/28/22 1604   Wed Dec 28, 2022   1444 I was called down to look at the patient after she received her Zofran it looks like she was having allergic reaction in the arm with welts and itching. No shortness of breath. I gave her some Benadryl. [JL]   3265 Lab results were reviewed [JL]   8024 Results she is feeling little bit better but did not necessarily feel comfortable going home. I did discuss the case with Dr. Anthony Mclean for the admitting team and he does not feel this patient meets criteria for hospitalization and we already are short on beds.   I talked to the patient she is okay going home. [JL]      ED Course User Index  [JL] Annette Carranza MD         CRITICAL CARE:   None    PROCEDURES:  None      DATA FOR LAB AND RADIOLOGY TESTS ORDERED BELOW ARE REVIEWED BY THE ED CLINICIAN:    RADIOLOGY: All x-rays, CT, MRI, and formal ultrasound images (except ED bedside ultrasound) are read by the radiologist, see reports below, unless otherwise noted in MDM or here. Reports below are reviewed by myself. CT ABDOMEN PELVIS WO CONTRAST Additional Contrast? None   Final Result   1. No CT evidence of an acute intra-abdominal or intrapelvic process. 2. Punctate nonobstructing calculi mid inferior right kidney. 3.  No findings to suggest acute appendicitis; no ureter calculus or   hydronephrosis. 4. Mild diverticulosis coli without CT evidence of acute diverticulitis. 5. Small hiatal hernia. LABS: Lab orders shown below, the results are reviewed by myself, and all abnormals are listed below.   Labs Reviewed   COMPREHENSIVE METABOLIC PANEL - Abnormal; Notable for the following components:       Result Value    Glucose 341 (*)     Creatinine 1.22 (*)     Est, Glom Filt Rate 47 (*)     Sodium 133 (*)     Chloride 94 (*)     ALT 37 (*)     AST 48 (*)     All other components within normal limits   LIPASE - Abnormal; Notable for the following components:    Lipase 67 (*)     All other components within normal limits   CBC WITH AUTO DIFFERENTIAL   TSH   T4, FREE   URINALYSIS WITH REFLEX TO CULTURE       Vitals Reviewed:    Vitals:    12/28/22 1346 12/28/22 1553   BP: 120/65    Pulse: 75    Resp: 16    Temp: 97 °F (36.1 °C)    TempSrc: Oral    SpO2: 93%    Weight: 218 lb (98.9 kg) 220 lb (99.8 kg)   Height: 5' 3\" (1.6 m) 5' 3\" (1.6 m)     MEDICATIONS GIVEN TO PATIENT THIS ENCOUNTER:  Orders Placed This Encounter   Medications    0.9 % sodium chloride bolus    ondansetron (ZOFRAN) injection 4 mg    DISCONTD: fentaNYL (SUBLIMAZE) injection 50 mcg    oxyCODONE-acetaminophen (PERCOCET) 5-325 MG per tablet 1 tablet    diphenhydrAMINE (BENADRYL) 50 MG/ML injection     Debora Constantino: cabinet override    metoclopramide (REGLAN) 10 MG tablet     Sig: Take 1 tablet by mouth 4 times daily WARNING:  May cause drowsiness. May impair ability to operate vehicles or machinery. Do not use in combination with alcohol. Dispense:  20 tablet     Refill:  0    cyclobenzaprine (FLEXERIL) 10 MG tablet     Sig: Take 1 tablet by mouth 3 times daily as needed for Muscle spasms     Dispense:  21 tablet     Refill:  0     DISCHARGE PRESCRIPTIONS:  New Prescriptions    CYCLOBENZAPRINE (FLEXERIL) 10 MG TABLET    Take 1 tablet by mouth 3 times daily as needed for Muscle spasms    METOCLOPRAMIDE (REGLAN) 10 MG TABLET    Take 1 tablet by mouth 4 times daily WARNING:  May cause drowsiness. May impair ability to operate vehicles or machinery. Do not use in combination with alcohol. PHYSICIAN CONSULTS ORDERED THIS ENCOUNTER:  IP CONSULT TO PRIMARY CARE PROVIDER    FINAL IMPRESSION      1. Flank pain    2. Nausea and vomiting, unspecified vomiting type          DISPOSITION/PLAN   DISPOSITION Decision To Discharge 12/28/2022 03:54:51 PM      PATIENT REFERRED TO:  VERONIKA Abbasi CNP  [address] [facility]    In 1 week      [facility] ED  [facility] [address]  [phone]    If symptoms worsen    Howard Velasquez MD  [address]  [address] [facility]  [phone]    In 1 week      DISCHARGE MEDICATIONS:  New Prescriptions    CYCLOBENZAPRINE (FLEXERIL) 10 MG TABLET    Take 1 tablet by mouth 3 times daily as needed for Muscle spasms    METOCLOPRAMIDE (REGLAN) 10 MG TABLET    Take 1 tablet by mouth 4 times daily WARNING:  May cause drowsiness. May impair ability to operate vehicles or machinery. Do not use in combination with alcohol.        (Please note that portions of this note were completed with a voice recognition program.  Efforts were made to edit the dictations but occasionally words are mis-transcribed.)    Lupe Carpenter MD  Attending Keagan Sagastume MD  12/28/22 3936

## 2023-01-09 ENCOUNTER — HOSPITAL ENCOUNTER (OUTPATIENT)
Age: 73
Setting detail: SPECIMEN
Discharge: HOME | End: 2023-01-09

## 2023-01-09 ENCOUNTER — OFFICE VISIT (OUTPATIENT)
Dept: PRIMARY CARE CLINIC | Age: 73
End: 2023-01-09

## 2023-01-09 VITALS
WEIGHT: 220 LBS | HEART RATE: 76 BPM | BODY MASS INDEX: 38.98 KG/M2 | DIASTOLIC BLOOD PRESSURE: 78 MMHG | OXYGEN SATURATION: 93 % | HEIGHT: 63 IN | SYSTOLIC BLOOD PRESSURE: 135 MMHG

## 2023-01-09 DIAGNOSIS — R31.9 HEMATURIA, UNSPECIFIED TYPE: Primary | ICD-10-CM

## 2023-01-09 DIAGNOSIS — B37.31 CANDIDAL VAGINITIS: ICD-10-CM

## 2023-01-09 DIAGNOSIS — Z09 HOSPITAL DISCHARGE FOLLOW-UP: ICD-10-CM

## 2023-01-09 LAB
BILIRUBIN, POC: NORMAL
BLOOD URINE, POC: NORMAL
CLARITY, POC: CLEAR
COLOR, POC: YELLOW
GLUCOSE URINE, POC: 1000
KETONES, POC: NORMAL
LEUKOCYTE EST, POC: NORMAL
NITRITE, POC: NORMAL
PH, POC: 5
PROTEIN, POC: NORMAL
SPECIFIC GRAVITY, POC: 100
UROBILINOGEN, POC: 0.2

## 2023-01-09 PROCEDURE — 1111F DSCHRG MED/CURRENT MED MERGE: CPT | Performed by: NURSE PRACTITIONER

## 2023-01-09 PROCEDURE — 3075F SYST BP GE 130 - 139MM HG: CPT | Performed by: NURSE PRACTITIONER

## 2023-01-09 PROCEDURE — 81003 URINALYSIS AUTO W/O SCOPE: CPT | Performed by: NURSE PRACTITIONER

## 2023-01-09 PROCEDURE — 99213 OFFICE O/P EST LOW 20 MIN: CPT | Performed by: NURSE PRACTITIONER

## 2023-01-09 PROCEDURE — 3078F DIAST BP <80 MM HG: CPT | Performed by: NURSE PRACTITIONER

## 2023-01-09 PROCEDURE — 1123F ACP DISCUSS/DSCN MKR DOCD: CPT | Performed by: NURSE PRACTITIONER

## 2023-01-09 RX ORDER — FLUCONAZOLE 150 MG/1
150 TABLET ORAL ONCE
Qty: 1 TABLET | Refills: 0 | Status: SHIPPED | OUTPATIENT
Start: 2023-01-09 | End: 2023-01-09

## 2023-01-09 RX ORDER — PEN NEEDLE, DIABETIC 29 G X1/2"
NEEDLE, DISPOSABLE MISCELLANEOUS
COMMUNITY
Start: 2022-11-16

## 2023-01-09 ASSESSMENT — PATIENT HEALTH QUESTIONNAIRE - PHQ9
SUM OF ALL RESPONSES TO PHQ QUESTIONS 1-9: 0
3. TROUBLE FALLING OR STAYING ASLEEP: 0
10. IF YOU CHECKED OFF ANY PROBLEMS, HOW DIFFICULT HAVE THESE PROBLEMS MADE IT FOR YOU TO DO YOUR WORK, TAKE CARE OF THINGS AT HOME, OR GET ALONG WITH OTHER PEOPLE: 0
2. FEELING DOWN, DEPRESSED OR HOPELESS: 0
5. POOR APPETITE OR OVEREATING: 0
9. THOUGHTS THAT YOU WOULD BE BETTER OFF DEAD, OR OF HURTING YOURSELF: 0
8. MOVING OR SPEAKING SO SLOWLY THAT OTHER PEOPLE COULD HAVE NOTICED. OR THE OPPOSITE, BEING SO FIGETY OR RESTLESS THAT YOU HAVE BEEN MOVING AROUND A LOT MORE THAN USUAL: 0
6. FEELING BAD ABOUT YOURSELF - OR THAT YOU ARE A FAILURE OR HAVE LET YOURSELF OR YOUR FAMILY DOWN: 0
4. FEELING TIRED OR HAVING LITTLE ENERGY: 0
7. TROUBLE CONCENTRATING ON THINGS, SUCH AS READING THE NEWSPAPER OR WATCHING TELEVISION: 0
SUM OF ALL RESPONSES TO PHQ QUESTIONS 1-9: 0

## 2023-01-09 ASSESSMENT — ENCOUNTER SYMPTOMS
ABDOMINAL PAIN: 0
PHOTOPHOBIA: 0
DIARRHEA: 0
COLOR CHANGE: 0
SORE THROAT: 0
SHORTNESS OF BREATH: 0
VOMITING: 0
NAUSEA: 0
SINUS PAIN: 0
BACK PAIN: 0
CHEST TIGHTNESS: 0
COUGH: 0
SINUS PRESSURE: 0

## 2023-01-09 NOTE — PROGRESS NOTES
[facility]  [id number] [address]  Dept: Juliane Mix is a 67 y.o. female who presents today for her  medical conditions/complaintsas noted below. Kostas Michael is c/o of Follow-Up from Hospital (Pt has been having nausea vomiting and vaginal bleeding poss from kidney stones pt states ) and Vaginitis (Pt has been having itching and redness with some discharge )    HPI:     HPI    Patient is here for hospital follow up. Has been evaluated in the emergency department twice since the end of December for nephrolithiasis and hematuria. Patient has long history of urinary incontinence, and states when she has passed the kidney stones, she is noticing a small amount of blood tinged urine to the incontinence pad. Overall, patient states she is feeling better and has passed 3/4 kidney stones. She does have concerns for a yeast infection. Patient states, 'I mentioned this when I was in the emergency department, but no one did anything about it'. She describes symptoms of itching and tenderness with erythema to vagina/external labia. She does tell me her blood sugars have also be poorly controlled lately, but would like to further discuss this issue with her routine provider. Patient is not sexually active and denies any pelvic pain or abnormal vaginal discharge. Past Medical History:   Diagnosis Date    Abdominal bloating 1/26/2021    Adenomatous polyp of ascending colon 1/26/2021    Allergic rhinitis     Anxiety 7/17/2013    Arthritis     Asthma     Atrial fibrillation (HCC)     Back pain     NERVE/DR. GRACIA    Benign hypertension with CKD (chronic kidney disease) stage III (HCC)     CAD (coronary artery disease) 3/21/2013    Caffeine use     2 coffee/day    CHF (congestive heart failure) (HCC)     Chronic kidney disease     CKD (chronic kidney disease) stage 3, GFR 30-59 ml/min (HCC)     COPD (chronic obstructive pulmonary disease) (HCC)     emphysema    Degeneration of lumbar or lumbosacral intervertebral disc     Depression     DM (diabetes mellitus) ([address]     Emphysema of lung (HCC)     Gastritis     GERD (gastroesophageal reflux disease)     Hearing loss     Hematuria     Hiatal hernia     HTN (hypertension), benign 6/28/2014    Hypertriglyceridemia     Incontinence of urine 9/25/2013    Irritable bowel syndrome with both constipation and diarrhea 1/26/2021    Knee arthropathy     Lumbosacral spondylosis without myelopathy 9/29/2014    Migraine headache     DR. GRACIA    [place]     Neuropathy     Obesity     On home oxygen therapy     2 L PER NC  HS AND PRN    HIGINIO on CPAP     Otitis media 1-26-15    Secondary diabetes mellitus with stage 3 chronic kidney disease (GFR 30-59) ([facility])     Stroke (HCC)      mini stroke. Tinnitus     Type 2 diabetes mellitus without complication ([facility])     Type II or unspecified type diabetes mellitus without mention of complication, not stated as uncontrolled     UTI (lower urinary tract infection)     Varicose vein       Past Surgical History:   Procedure Laterality Date    ABLATION OF DYSRHYTHMIC FOCUS  2000    CARPAL TUNNEL RELEASE Right     CATARACT REMOVAL WITH IMPLANT Bilateral     CHOLECYSTECTOMY  2007    COLONOSCOPY      COLONOSCOPY  04/10/2017    tubular adenomo polyp , mod. sigmoid diverticulosis, min. int. hemorrhoids    COLONOSCOPY N/A 3/2/2021    COLONOSCOPY WITH BIOPSY performed by Liz Dang MD at [address] [place] [address]  9/25/2013    DILATION AND CURETTAGE  1979    EYE SURGERY      laser    INCONTINENCE SURGERY      Percutaneous nerve stimulation Dr Suzanne Krishnan Nov 2013     INSERTABLE CARDIAC MONITOR  12/04/2015    docplannerTRONIC REVEAL LINQ MODEL #[id number] MRI CONDTIONAL OK 3T.  IMMEDIATELY POST IMPLANT    OTHER SURGICAL HISTORY  09/10/15    removal of interstim    SD COLSC FLX W/REMOVAL LESION BY HOT BX FORCEPS N/A 4/10/2017    COLONOSCOPY POLYPECTOMY HOT BIOPSY performed by Panda Villa MD at [address] [facility]      TYMPANOSTOMY TUBE PLACEMENT  2017    UPPER GASTROINTESTINAL ENDOSCOPY  2016    Dr Janae Greenberg N/A 3/2/2021    EGD BIOPSY performed by Graham Ferrell MD at [facility]     Family History   Problem Relation Age of Onset    Diabetes Mother     Heart Disease Mother     Stomach Cancer Father     Diabetes Maternal Grandmother         also aunts and uncles (maternal)    Emphysema Sister      Social History     Tobacco Use    Smoking status: Former     Packs/day: 3.00     Years: 41.00     Pack years: 123.00     Types: Cigarettes     Quit date: 2000     Years since quittin.0    Smokeless tobacco: Never    Tobacco comments:     quit in    Substance Use Topics    Alcohol use: No     Alcohol/week: 0.0 standard drinks      Current Outpatient Medications   Medication Sig Dispense Refill    fluconazole (DIFLUCAN) 150 MG tablet Take 1 tablet by mouth once for 1 dose 1 tablet 0    [employer] MINI PEN NEEDLES 31G X 6 MM MISC       metoclopramide (REGLAN) 10 MG tablet Take 1 tablet by mouth 4 times daily WARNING:  May cause drowsiness. May impair ability to operate vehicles or machinery. Do not use in combination with alcohol.  20 tablet 0    escitalopram (LEXAPRO) 20 MG tablet Take 20 mg by mouth daily      docusate sodium (COLACE) 100 MG capsule TAKE 1 CAPSULE BY MOUTH TWICE A DAY 60 capsule 2    isosorbide dinitrate (ISORDIL) 30 MG tablet TAKE 1 TABLET BY MOUTH ONCE DAILY 30 tablet 2    pantoprazole (PROTONIX) 40 MG tablet TAKE 1 TABLET BY MOUTH TWICE A DAY 60 tablet 2    clopidogrel (PLAVIX) 75 MG tablet TAKE 1 TAB BY MOUTH ONCE A DAY ( IN THE MORNING ) -THIS MEDICINE MAY BE TAKENWITH OR WITHOUT FOOD 90 tablet 1    oxyCODONE-acetaminophen (PERCOCET) 5-325 MG per tablet Take 1 tablet by mouth every 8 hours as needed for Pain for up to 30 days. Intended supply: 30 days. 90 tablet 0    metFORMIN (GLUCOPHAGE) 1000 MG tablet TAKE 1 TABLET BY MOUTH 2 TIMES DAILY (WITH MEALS) 60 tablet 1    fenofibrate (TRIGLIDE) 160 MG tablet TAKE 1 TABLET BY MOUTH DAILY 90 tablet 1    TRULICITY 1.5 ZX/6.5RS SC injection INJECT ONE AND A HALF (1 & 1/2) MG INTO THE SKIN ONCE A WEEK STOP 0.75 4 Adjustable Dose Pre-filled Pen Syringe 11    furosemide (LASIX) 20 MG tablet TAKE 1 TABLET BY MOUTH ONCE DAILY AS NEEDED 30 tablet 0    oxybutynin (DITROPAN-XL) 5 MG extended release tablet TAKE 1 TABLET BY MOUTH DAILY 30 tablet 3    budesonide-formoterol (SYMBICORT) 160-4.5 MCG/ACT AERO Inhale 2 puffs into the lungs in the morning and 2 puffs before bedtime.  As needed for sob. 10.2 g 2    blood glucose test strips (TRUE METRIX BLOOD GLUCOSE TEST) strip THREE TIMES A  each 3    vitamin B-12 (CYANOCOBALAMIN) 100 MCG tablet take half a tablet BY MOUTH ONCE DAILY 30 tablet 0    Melatonin 10 MG TABS Take 10 mg by mouth nightly 90 tablet 3    dicyclomine (BENTYL) 10 MG capsule TAKE 1 CAPSULE BY MOUTH TWICE A DAY AS NEEDED FOR ABDOMINAL SPASMS (Patient not taking: Reported on 12/28/2022) 60 capsule 0    SM ASPIRIN ADULT LOW STRENGTH 81 MG EC tablet TAKE 1 TABLET BY MOUTH ONCE DAILY 90 tablet 4    FEROSUL 325 (65 Fe) MG tablet TAKE 1 TAB BY MOUTH IN THE MORNING 90 tablet 2    insulin aspart (NOVOLOG FLEXPEN) 100 UNIT/ML injection pen IF<139 NO INSULIN; 140-199-2 UN;200-249-4 UN;250-299-6 UN;300-349-8 UN;350-400=10 UN;ABOVE 400-12 UNIT(S) 10 mL 3    magnesium oxide (MAG-OX) 400 MG tablet Take 400 mg by mouth 2 times daily      insulin glargine (BASAGLAR KWIKPEN) 100 UNIT/ML injection pen Inject 55 Units into the skin 2 times daily 10 mL 2    losartan (COZAAR) 25 MG tablet TAKE 1 TABLET BY MOUTH ONCE DAILY 30 tablet 9    carvedilol (COREG) 3.125 MG tablet TAKE 1 TAB BY MOUTH TWICE A DAY ( IN THE MORNING AND BEDTIME ) 180 tablet 3    Multiple Vitamins-Minerals (CERTAVITE/ANTIOXIDANTS) TABS TAKE 1 TABLET BY MOUTH ONCE DAILY 30 tablet 5    topiramate (TOPAMAX) 100 MG tablet TAKE 1 TABLET BY MOUTH NIGHTLY  90 tablet 2    atorvastatin (LIPITOR) 40 MG tablet Take 1 tablet by mouth daily 90 tablet 3    albuterol (PROVENTIL) (2.5 MG/3ML) 0.083% nebulizer solution Take 3 mLs by nebulization every 6 hours as needed for Wheezing 120 each 3    OXYGEN Inhale 3 L into the lungs        No current facility-administered medications for this visit. Allergies   Allergen Reactions    Robitussin [Guaifenesin] Anaphylaxis    Codeine Swelling    Compazine [Prochlorperazine Maleate] Hives and Swelling    Iodides Itching    Morphine Other (See Comments)     hallucinations    Moxifloxacin      Other reaction(s): Intolerance-unknown  Other reaction(s): Intolerance-unknown    Reglan [Metoclopramide] Nausea Only    Avelox [Moxifloxacin Hcl In Nacl] Rash     Dermatitis      Cipro Xr Rash     dermatitis    Sulfa Antibiotics Rash       Health Maintenance   Topic Date Due    DTaP/Tdap/Td vaccine (1 - Tdap) Never done    Diabetic Alb to Cr ratio (uACR) test  07/15/2016    Diabetic retinal exam  10/29/2021    Annual Wellness Visit (AWV)  Never done    Breast cancer screen  11/17/2022    A1C test (Diabetic or Prediabetic)  01/13/2023    Lipids  03/09/2023    Diabetic foot exam  05/06/2023    Depression Monitoring  05/10/2023    GFR test (Diabetes, CKD 3-4, OR last GFR 15-59)  12/28/2023    Colorectal Cancer Screen  03/02/2024    DEXA (modify frequency per FRAX score)  Completed    Flu vaccine  Completed    Shingles vaccine  Completed    Pneumococcal 65+ years Vaccine  Completed    COVID-19 Vaccine  Completed    Hepatitis C screen  Completed    Hepatitis A vaccine  Aged Out    Hib vaccine  Aged Out    Meningococcal (ACWY) vaccine  Aged Out       Review of Systems   Constitutional:  Negative for activity change, appetite change and fever. HENT:  Negative for sinus pressure, sinus pain and sore throat.     Eyes:  Negative for photophobia and visual disturbance. Respiratory:  Negative for cough, chest tightness and shortness of breath. Cardiovascular:  Negative for chest pain. Gastrointestinal:  Negative for abdominal pain, diarrhea, nausea and vomiting. Endocrine: Negative for polydipsia and polyuria. Genitourinary:  Positive for hematuria. Negative for difficulty urinating. Vaginal itching     Musculoskeletal:  Negative for back pain and neck pain. Skin:  Negative for color change. Neurological:  Negative for dizziness, light-headedness and headaches. Psychiatric/Behavioral:  Negative for behavioral problems. Objective:     Physical Exam  Vitals and nursing note reviewed. Constitutional:       General: She is not in acute distress. Appearance: Normal appearance. She is obese. HENT:      Head: Normocephalic. Right Ear: External ear normal.      Left Ear: External ear normal.      Nose: Nose normal. No congestion or rhinorrhea. Mouth/Throat:      Mouth: Mucous membranes are moist.      Pharynx: Oropharynx is clear. Eyes:      Conjunctiva/sclera: Conjunctivae normal.      Pupils: Pupils are equal, round, and reactive to light. Cardiovascular:      Rate and Rhythm: Normal rate and regular rhythm. Pulses: Normal pulses. Heart sounds: Normal heart sounds. No murmur heard. Pulmonary:      Effort: Pulmonary effort is normal. No respiratory distress. Breath sounds: Normal breath sounds. No wheezing. Abdominal:      Palpations: Abdomen is soft. Tenderness: There is no abdominal tenderness. Musculoskeletal:         General: No swelling or signs of injury. Normal range of motion. Cervical back: Normal range of motion. Skin:     General: Skin is warm and dry. Capillary Refill: Capillary refill takes less than 2 seconds. Neurological:      General: No focal deficit present. Mental Status: She is alert and oriented to person, place, and time.  Mental status is at baseline. Motor: No weakness. Gait: Gait normal.   Psychiatric:         Behavior: Behavior normal.       Assessment:      No results found for this visit on 01/09/23. Siobhan Guo was seen today for follow-up from hospital and vaginitis. Diagnoses and all orders for this visit:    Hematuria, unspecified type  -     POCT Urinalysis No Micro (Auto)    Candidal vaginitis  -     Vaginitis DNA Probe; Future  -     fluconazole (DIFLUCAN) 150 MG tablet; Take 1 tablet by mouth once for 1 dose     As patient mentions, suspect small amount of hematuria is related to passing kidney stones. Will check UA accordingly. Vaginitis swab obtained and will send over treatment for candidiasis. Discussed with patient with blood sugars being out of control, this is likely not helping the issue with yeas infections. Offered to discuss blood sugar management, however, patient declined and prefers to wait until her next appointment with routine provider at the end of the month. Return if symptoms worsen or fail to improve. Plan of care reviewed with patient. Questions and concerns addressed to patient satisfaction. Follow up as directed.      Electronically signed by VERONIKA Person CNP, PACon 1/9/2023 at 2:16 PM

## 2023-01-10 ENCOUNTER — TELEPHONE (OUTPATIENT)
Dept: PRIMARY CARE CLINIC | Age: 73
End: 2023-01-10

## 2023-01-10 DIAGNOSIS — N76.0 BACTERIAL VAGINOSIS: Primary | ICD-10-CM

## 2023-01-10 DIAGNOSIS — B96.89 BACTERIAL VAGINOSIS: Primary | ICD-10-CM

## 2023-01-10 LAB
CANDIDA SPECIES, DNA PROBE: NEGATIVE
GARDNERELLA VAGINALIS, DNA PROBE: POSITIVE
SOURCE: ABNORMAL
TRICHOMONAS VAGINALIS DNA: NEGATIVE

## 2023-01-10 RX ORDER — METRONIDAZOLE 500 MG/1
500 TABLET ORAL 2 TIMES DAILY
Qty: 14 TABLET | Refills: 0 | Status: SHIPPED | OUTPATIENT
Start: 2023-01-10 | End: 2023-01-17

## 2023-01-10 NOTE — TELEPHONE ENCOUNTER
Please  let the patient know her vaginal swab was positive for bacterial vaginosis. I have sent over treatment. I encourage the patient to ensure she is staying clean and dry and avoiding any scented soaps/washes.

## 2023-01-12 ENCOUNTER — HOSPITAL ENCOUNTER (OUTPATIENT)
Dept: PAIN MANAGEMENT | Age: 73
Discharge: HOME | End: 2023-01-12
Payer: COMMERCIAL

## 2023-01-12 VITALS
HEART RATE: 86 BPM | TEMPERATURE: 97.3 F | OXYGEN SATURATION: 89 % | RESPIRATION RATE: 20 BRPM | SYSTOLIC BLOOD PRESSURE: 125 MMHG | DIASTOLIC BLOOD PRESSURE: 74 MMHG

## 2023-01-12 DIAGNOSIS — M50.30 DDD (DEGENERATIVE DISC DISEASE), CERVICAL: Chronic | ICD-10-CM

## 2023-01-12 DIAGNOSIS — M54.42 CHRONIC BILATERAL LOW BACK PAIN WITH BILATERAL SCIATICA: Chronic | ICD-10-CM

## 2023-01-12 DIAGNOSIS — M54.16 LUMBAR RADICULOPATHY, CHRONIC: Primary | ICD-10-CM

## 2023-01-12 DIAGNOSIS — G89.29 CHRONIC BILATERAL LOW BACK PAIN WITH BILATERAL SCIATICA: Chronic | ICD-10-CM

## 2023-01-12 DIAGNOSIS — Z79.891 CHRONIC USE OF OPIATE FOR THERAPEUTIC PURPOSE: ICD-10-CM

## 2023-01-12 DIAGNOSIS — M51.36 DDD (DEGENERATIVE DISC DISEASE), LUMBAR: ICD-10-CM

## 2023-01-12 DIAGNOSIS — M47.817 LUMBOSACRAL SPONDYLOSIS WITHOUT MYELOPATHY: Chronic | ICD-10-CM

## 2023-01-12 DIAGNOSIS — M46.1 SACROILIITIS, NOT ELSEWHERE CLASSIFIED (HCC): Chronic | ICD-10-CM

## 2023-01-12 DIAGNOSIS — M54.41 CHRONIC BILATERAL LOW BACK PAIN WITH BILATERAL SCIATICA: Chronic | ICD-10-CM

## 2023-01-12 PROCEDURE — 99213 OFFICE O/P EST LOW 20 MIN: CPT

## 2023-01-12 PROCEDURE — 99213 OFFICE O/P EST LOW 20 MIN: CPT | Performed by: NURSE PRACTITIONER

## 2023-01-12 RX ORDER — OXYCODONE HYDROCHLORIDE AND ACETAMINOPHEN 5; 325 MG/1; MG/1
1 TABLET ORAL EVERY 8 HOURS PRN
Qty: 90 TABLET | Refills: 0 | Status: SHIPPED | OUTPATIENT
Start: 2023-01-18 | End: 2023-02-17

## 2023-01-12 ASSESSMENT — ENCOUNTER SYMPTOMS
SHORTNESS OF BREATH: 0
BACK PAIN: 1
CONSTIPATION: 0
COUGH: 0

## 2023-01-12 ASSESSMENT — PAIN SCALES - GENERAL: PAINLEVEL_OUTOF10: 7

## 2023-01-12 NOTE — PROGRESS NOTES
Chief Complaint   Patient presents with    Back Pain         PMH  Pt c/o low back pain that radiates down legs. MRI done in 10- with multilevel degenerative changes and Right paracentral disc extrusion L4-5 narrowing the right lateral recess. She has never had a lumbar surgery and not interested in seeing NS for opinion. Patient states that she had a bad experience with an injection in the past and is not interested in any interventional pain procedures  She is dependant on her scooter to get around - can walk very short distances. Working on weight loss and reports recent 20lb loss over last 2 months. Pt has had injections but refuses to repeat d/t exreme pain during injections. Recent admission for hyperglycemia and pneumonia  on home O2  She saw Dr. Estela Nuñez and he referred her to PT - she has had 5/12 visits and needed new referral to go back - referral given at last visit. She has not started sessions yet. She was in the ER recently for kidney stones. Back Pain  This is a chronic problem. The current episode started more than 1 year ago. The problem occurs constantly. The problem is unchanged. The pain is present in the lumbar spine and gluteal. The quality of the pain is described as aching and stabbing. The pain radiates to the left knee, left thigh, right foot, right knee, right thigh and left foot. The pain is at a severity of 7/10. The pain is moderate. The pain is Worse during the night. The symptoms are aggravated by bending, lying down, position, twisting and sitting. Associated symptoms include headaches. Pertinent negatives include no chest pain, fever, numbness, tingling or weakness. She has tried bed rest, home exercises, heat, ice, walking, muscle relaxant and NSAIDs for the symptoms. Patient denies any new neurological symptoms. No bowel or bladder incontinence, no weakness, and no falling.     Pill count: Percocet 21 - due 1/18    Morphine equivalent: 22.5    Controlled Substance Monitoring:    Acute and Chronic Pain Monitoring:   RX Monitoring 1/12/2023   Attestation -   Acute Pain Prescriptions -   Periodic Controlled Substance Monitoring Possible medication side effects, risk of tolerance/dependence & alternative treatments discussed. ;No signs of potential drug abuse or diversion identified.;Obtaining appropriate analgesic effect of treatment. Chronic Pain > 50 MEDD -   Chronic Pain > 80 MEDD -            Past Medical History:   Diagnosis Date    Abdominal bloating 1/26/2021    Adenomatous polyp of ascending colon 1/26/2021    Allergic rhinitis     Anxiety 7/17/2013    Arthritis     Asthma     Atrial fibrillation ([facility])     Back pain     NERVE/DR. GRACIA    Benign hypertension with CKD (chronic kidney disease) stage III ([facility])     CAD (coronary artery disease) 3/21/2013    Caffeine use     2 coffee/day    CHF (congestive heart failure) ([facility])     Chronic kidney disease     CKD (chronic kidney disease) stage 3, GFR 30-59 ml/min ([facility])     COPD (chronic obstructive pulmonary disease) ([facility])     emphysema    Degeneration of lumbar or lumbosacral intervertebral disc     Depression     DM (diabetes mellitus) ([facility]     Emphysema of lung ([facility])     Gastritis     GERD (gastroesophageal reflux disease)     Hearing loss     Hematuria     Hiatal hernia     HTN (hypertension), benign 6/28/2014    Hypertriglyceridemia     Incontinence of urine 9/25/2013    Irritable bowel syndrome with both constipation and diarrhea 1/26/2021    Knee arthropathy     Lumbosacral spondylosis without myelopathy 9/29/2014    Migraine headache     DR. GRACIA    Mumps     Neuropathy     Obesity     On home oxygen therapy     2 L PER NC  HS AND PRN    HIGINIO on CPAP     Otitis media 1-26-15    Secondary diabetes mellitus with stage 3 chronic kidney disease (GFR 30-59) ([facility])     Stroke ([facility])      mini stroke.     Tinnitus     Type 2 diabetes mellitus without complication ([facility])     Type II or unspecified type diabetes mellitus without mention of complication, not stated as uncontrolled     UTI (lower urinary tract infection)     Varicose vein        Past Surgical History:   Procedure Laterality Date    ABLATION OF DYSRHYTHMIC FOCUS  2000    CARPAL TUNNEL RELEASE Right     CATARACT REMOVAL WITH IMPLANT Bilateral     CHOLECYSTECTOMY  2007    COLONOSCOPY      COLONOSCOPY  04/10/2017    tubular adenomo polyp , mod. sigmoid diverticulosis, min. int. hemorrhoids    COLONOSCOPY N/A 3/2/2021    COLONOSCOPY WITH BIOPSY performed by Aure Marvin MD at [address] [place] [address]  9/25/2013    DILATION AND CURETTAGE  1979    EYE SURGERY      laser    INCONTINENCE SURGERY      Percutaneous nerve stimulation Dr Cierra Woodruff Nov 2013     INSERTABLE CARDIAC MONITOR  12/04/2015    [employer] REVEAL LINQ MODEL #[id number] MRI CONDTIONAL OK 3T. IMMEDIATELY POST IMPLANT    OTHER SURGICAL HISTORY  09/10/15    removal of interstim    ME COLSC FLX W/REMOVAL LESION BY HOT BX FORCEPS N/A 4/10/2017    COLONOSCOPY POLYPECTOMY HOT BIOPSY performed by Félix Duran MD at [place] [address]  08/21/2017    UPPER GASTROINTESTINAL ENDOSCOPY  03/2016    Dr Tia Villa ENDOSCOPY N/A 3/2/2021    EGD BIOPSY performed by Aure Marvin MD at [facility]       Allergies   Allergen Reactions    Robitussin [Guaifenesin] Anaphylaxis    Codeine Swelling    Compazine [Prochlorperazine Maleate] Hives and Swelling    Iodides Itching    Morphine Other (See Comments)     hallucinations    Moxifloxacin      Other reaction(s): Intolerance-unknown  Other reaction(s):  Intolerance-unknown    Reglan [Metoclopramide] Nausea Only    Avelox [Moxifloxacin Hcl In Nacl] Rash     Dermatitis      Cipro Xr Rash     dermatitis    Sulfa Antibiotics Rash         Current Outpatient Medications:     metroNIDAZOLE (FLAGYL) 500 MG tablet, Take 1 tablet by mouth 2 times daily for 7 days, Disp: 14 tablet, Rfl: 0    ULTICARE MINI PEN NEEDLES 31G X 6 MM MISC, , Disp: , Rfl:     metoclopramide (REGLAN) 10 MG tablet, Take 1 tablet by mouth 4 times daily WARNING:  May cause drowsiness. May impair ability to operate vehicles or machinery. Do not use in combination with alcohol., Disp: 20 tablet, Rfl: 0    escitalopram (LEXAPRO) 20 MG tablet, Take 20 mg by mouth daily, Disp: , Rfl:     docusate sodium (COLACE) 100 MG capsule, TAKE 1 CAPSULE BY MOUTH TWICE A DAY, Disp: 60 capsule, Rfl: 2    isosorbide dinitrate (ISORDIL) 30 MG tablet, TAKE 1 TABLET BY MOUTH ONCE DAILY, Disp: 30 tablet, Rfl: 2    pantoprazole (PROTONIX) 40 MG tablet, TAKE 1 TABLET BY MOUTH TWICE A DAY, Disp: 60 tablet, Rfl: 2    clopidogrel (PLAVIX) 75 MG tablet, TAKE 1 TAB BY MOUTH ONCE A DAY ( IN THE MORNING ) -THIS MEDICINE MAY BE TAKENWITH OR WITHOUT FOOD, Disp: 90 tablet, Rfl: 1    oxyCODONE-acetaminophen (PERCOCET) 5-325 MG per tablet, Take 1 tablet by mouth every 8 hours as needed for Pain for up to 30 days. Intended supply: 30 days. , Disp: 90 tablet, Rfl: 0    metFORMIN (GLUCOPHAGE) 1000 MG tablet, TAKE 1 TABLET BY MOUTH 2 TIMES DAILY (WITH MEALS), Disp: 60 tablet, Rfl: 1    fenofibrate (TRIGLIDE) 160 MG tablet, TAKE 1 TABLET BY MOUTH DAILY, Disp: 90 tablet, Rfl: 1    TRULICITY 1.5 VF/1.9TA SC injection, INJECT ONE AND A HALF (1 & 1/2) MG INTO THE SKIN ONCE A WEEK STOP 0.75, Disp: 4 Adjustable Dose Pre-filled Pen Syringe, Rfl: 11    furosemide (LASIX) 20 MG tablet, TAKE 1 TABLET BY MOUTH ONCE DAILY AS NEEDED, Disp: 30 tablet, Rfl: 0    oxybutynin (DITROPAN-XL) 5 MG extended release tablet, TAKE 1 TABLET BY MOUTH DAILY, Disp: 30 tablet, Rfl: 3    budesonide-formoterol (SYMBICORT) 160-4.5 MCG/ACT AERO, Inhale 2 puffs into the lungs in the morning and 2 puffs before bedtime.  As needed for sob., Disp: 10.2 g, Rfl: 2    blood glucose test strips (TRUE METRIX BLOOD GLUCOSE TEST) strip, THREE TIMES A DAY, Disp: 100 each, Rfl: 3 vitamin B-12 (CYANOCOBALAMIN) 100 MCG tablet, take half a tablet BY MOUTH ONCE DAILY, Disp: 30 tablet, Rfl: 0    Melatonin 10 MG TABS, Take 10 mg by mouth nightly, Disp: 90 tablet, Rfl: 3    dicyclomine (BENTYL) 10 MG capsule, TAKE 1 CAPSULE BY MOUTH TWICE A DAY AS NEEDED FOR ABDOMINAL SPASMS (Patient not taking: Reported on 12/28/2022), Disp: 60 capsule, Rfl: 0    SM ASPIRIN ADULT LOW STRENGTH 81 MG EC tablet, TAKE 1 TABLET BY MOUTH ONCE DAILY, Disp: 90 tablet, Rfl: 4    FEROSUL 325 (65 Fe) MG tablet, TAKE 1 TAB BY MOUTH IN THE MORNING, Disp: 90 tablet, Rfl: 2    insulin aspart (NOVOLOG FLEXPEN) 100 UNIT/ML injection pen, IF<139 NO INSULIN; 140-199-2 UN;200-249-4 UN;250-299-6 UN;300-349-8 UN;350-400=10 UN;ABOVE 400-12 UNIT(S), Disp: 10 mL, Rfl: 3    magnesium oxide (MAG-OX) 400 MG tablet, Take 400 mg by mouth 2 times daily, Disp: , Rfl:     insulin glargine (BASAGLAR KWIKPEN) 100 UNIT/ML injection pen, Inject 55 Units into the skin 2 times daily, Disp: 10 mL, Rfl: 2    losartan (COZAAR) 25 MG tablet, TAKE 1 TABLET BY MOUTH ONCE DAILY, Disp: 30 tablet, Rfl: 9    carvedilol (COREG) 3.125 MG tablet, TAKE 1 TAB BY MOUTH TWICE A DAY ( IN THE MORNING AND BEDTIME ), Disp: 180 tablet, Rfl: 3    Multiple Vitamins-Minerals (CERTAVITE/ANTIOXIDANTS) TABS, TAKE 1 TABLET BY MOUTH ONCE DAILY, Disp: 30 tablet, Rfl: 5    topiramate (TOPAMAX) 100 MG tablet, TAKE 1 TABLET BY MOUTH NIGHTLY , Disp: 90 tablet, Rfl: 2    atorvastatin (LIPITOR) 40 MG tablet, Take 1 tablet by mouth daily, Disp: 90 tablet, Rfl: 3    albuterol (PROVENTIL) (2.5 MG/3ML) 0.083% nebulizer solution, Take 3 mLs by nebulization every 6 hours as needed for Wheezing, Disp: 120 each, Rfl: 3    OXYGEN, Inhale 3 L into the lungs , Disp: , Rfl:     Family History   Problem Relation Age of Onset    Diabetes Mother     Heart Disease Mother     Stomach Cancer Father     Diabetes Maternal Grandmother         also aunts and uncles (maternal)    Emphysema Sister        Social History     Socioeconomic History    Marital status: Legally      Spouse name: Not on file    Number of children: Not on file    Years of education: Not on file    Highest education level: Not on file   Occupational History    Occupation: disabled     Employer: N/A   Tobacco Use    Smoking status: Former     Packs/day: 3.00     Years: 41.00     Pack years: 123.00     Types: Cigarettes     Quit date: 2000     Years since quittin.0    Smokeless tobacco: Never    Tobacco comments:     quit in    Vaping Use    Vaping Use: Never used   Substance and Sexual Activity    Alcohol use: No     Alcohol/week: 0.0 standard drinks    Drug use: No    Sexual activity: Not Currently   Other Topics Concern    Not on file   Social History Narrative    Not on file     Social Determinants of Health     Financial Resource Strain: Low Risk     Difficulty of Paying Living Expenses: Not hard at all   Food Insecurity: No Food Insecurity    Worried About Running Out of Food in the Last Year: Never true    Ran Out of Food in the Last Year: Never true   Transportation Needs: Not on file   Physical Activity: Not on file   Stress: Not on file   Social Connections: Not on file   Intimate Partner Violence: Not on file   Housing Stability: Not on file       Review of Systems:  Review of Systems   Constitutional: Negative for chills and fever. Cardiovascular:  Negative for chest pain and palpitations. Respiratory:  Negative for cough and shortness of breath. Musculoskeletal:  Positive for back pain. Gastrointestinal:  Negative for constipation. Neurological:  Positive for headaches. Negative for disturbances in coordination, loss of balance, numbness, tingling and weakness. Physical Exam:  /74   Pulse 86   Temp 97.3 °F (36.3 °C)   Resp 20   LMP  (LMP Unknown)   SpO2 (!) 89%     Physical Exam  HENT:      Head: Normocephalic.    Pulmonary:      Effort: Pulmonary effort is normal.   Musculoskeletal: General: Normal range of motion. Cervical back: Normal range of motion. Lumbar back: Tenderness present. Skin:     General: Skin is warm and dry. Neurological:      Mental Status: She is alert and oriented to person, place, and time. Record/Diagnostics Review:    Last greg 2/2022 and was appropriate     Assessment:  Problem List Items Addressed This Visit       Chronic bilateral low back pain with bilateral sciatica (Chronic)    Relevant Medications    oxyCODONE-acetaminophen (PERCOCET) 5-325 MG per tablet (Start on 1/18/2023)    Chronic use of opiate for therapeutic purpose    DDD (degenerative disc disease), cervical (Chronic)    Relevant Medications    oxyCODONE-acetaminophen (PERCOCET) 5-325 MG per tablet (Start on 1/18/2023)    Lumbosacral spondylosis without myelopathy (Chronic)    Relevant Medications    oxyCODONE-acetaminophen (PERCOCET) 5-325 MG per tablet (Start on 1/18/2023)    Sacroiliitis, not elsewhere classified ([facility] [address] (Chronic)    Relevant Medications    oxyCODONE-acetaminophen (PERCOCET) 5-325 MG per tablet (Start on 1/18/2023)    DDD (degenerative disc disease), lumbar    Relevant Medications    oxyCODONE-acetaminophen (PERCOCET) 5-325 MG per tablet (Start on 1/18/2023)    Lumbar radiculopathy, chronic - Primary    Relevant Medications    oxyCODONE-acetaminophen (PERCOCET) 5-325 MG per tablet (Start on 1/18/2023)          Treatment Plan:  Patient relates current medications are helping the pain. Patient reports taking pain medications as prescribed, denies obtaining medications from different sources and denies use of illegal drugs. Medication risk and benefits have been discussed. Patient denies side effects from medications like nausea, vomiting, constipation or drowsiness. Patient reports current activities of daily living are possible due to medications and would like to continue them.      As always, we encourage daily stretching and strengthening exercises, and recommend minimizing use of pain medications unless patient cannot get through daily activities due to pain. Due to the high risk nature of this patient's pain medication close monitoring is required. Continue current medication management, pt has been stable and compliant. Script written for percocet  She plans to start PT soon  Follow up appointment made for 4 weeks    I have reviewed the chief complaint and history of present illness (including ROS and PFSH) and vital documentation by my staff and I agree with their documentation and have added where applicable.

## 2023-01-20 DIAGNOSIS — I10 ESSENTIAL HYPERTENSION: ICD-10-CM

## 2023-01-20 DIAGNOSIS — N39.3 STRESS INCONTINENCE: ICD-10-CM

## 2023-01-20 DIAGNOSIS — Z79.4 TYPE 2 DIABETES MELLITUS WITH DIABETIC POLYNEUROPATHY, WITH LONG-TERM CURRENT USE OF INSULIN (HCC): Chronic | ICD-10-CM

## 2023-01-20 DIAGNOSIS — I10 HTN (HYPERTENSION), BENIGN: Chronic | ICD-10-CM

## 2023-01-20 DIAGNOSIS — E11.42 TYPE 2 DIABETES MELLITUS WITH DIABETIC POLYNEUROPATHY, WITH LONG-TERM CURRENT USE OF INSULIN (HCC): Chronic | ICD-10-CM

## 2023-01-20 RX ORDER — OXYBUTYNIN CHLORIDE 5 MG/1
5 TABLET, EXTENDED RELEASE ORAL DAILY
Qty: 30 TABLET | Refills: 2 | Status: SHIPPED | OUTPATIENT
Start: 2023-01-20

## 2023-01-20 RX ORDER — CARVEDILOL 3.12 MG/1
TABLET ORAL
Qty: 180 TABLET | Refills: 3 | Status: SHIPPED | OUTPATIENT
Start: 2023-01-20

## 2023-01-20 RX ORDER — LOSARTAN POTASSIUM 25 MG/1
TABLET ORAL
Qty: 30 TABLET | Refills: 9 | Status: SHIPPED | OUTPATIENT
Start: 2023-01-20

## 2023-01-20 NOTE — TELEPHONE ENCOUNTER
Please Approve or Refuse.   Send to Pharmacy per Pt's Request:      Next Visit Date:  Visit date not found   Last Visit Date: 5/10/2022    Hemoglobin A1C (%)   Date Value   10/13/2022 13.9   07/08/2022 11.8   03/01/2022 12.5             ( goal A1C is < 7)   BP Readings from Last 3 Encounters:   01/12/23 125/74   01/09/23 135/78   12/28/22 120/65          (goal 120/80)  BUN   Date Value Ref Range Status   12/28/2022 21 8 - 23 mg/dL Final     Creatinine   Date Value Ref Range Status   12/28/2022 1.22 (H) 0.50 - 0.90 mg/dL Final     Potassium   Date Value Ref Range Status   12/28/2022 4.7 3.7 - 5.3 mmol/L Final

## 2023-01-25 ENCOUNTER — TELEPHONE (OUTPATIENT)
Dept: PAIN MANAGEMENT | Age: 73
End: 2023-01-25

## 2023-01-25 NOTE — TELEPHONE ENCOUNTER
Patient calls [facility] and states she went to the corner store in her scooter yesterday and believes her medication fell out of the basket. When she returned home the medication bottle was gone. Patient states she went back along the same route she took; no pills. I did advise per the medication contract she signed, this office cannot replace lost or stolen medications. I did offer the patient to have something called in for withdrawal; patient declines. She asks for a \"few\" pills to get her through; told her the medication cannot be refilled until 2/17. I again offered something for withdrawal; patient declines. I told her to call if she changes her mind. Patient verbalizes understanding.

## 2023-02-03 RX ORDER — BLOOD SUGAR DIAGNOSTIC
STRIP MISCELLANEOUS
Qty: 100 EACH | Refills: 0 | OUTPATIENT
Start: 2023-02-03

## 2023-02-03 RX ORDER — BLOOD SUGAR DIAGNOSTIC
STRIP MISCELLANEOUS
Qty: 100 STRIP | Refills: 0 | Status: SHIPPED | OUTPATIENT
Start: 2023-02-03

## 2023-02-06 ENCOUNTER — OFFICE VISIT (OUTPATIENT)
Dept: PRIMARY CARE CLINIC | Age: 73
End: 2023-02-06
Payer: COMMERCIAL

## 2023-02-06 VITALS
WEIGHT: 229.2 LBS | OXYGEN SATURATION: 92 % | DIASTOLIC BLOOD PRESSURE: 80 MMHG | SYSTOLIC BLOOD PRESSURE: 119 MMHG | BODY MASS INDEX: 40.6 KG/M2 | HEART RATE: 89 BPM

## 2023-02-06 DIAGNOSIS — M54.41 CHRONIC BILATERAL LOW BACK PAIN WITH BILATERAL SCIATICA: ICD-10-CM

## 2023-02-06 DIAGNOSIS — E66.01 MORBID OBESITY WITH BMI OF 40.0-44.9, ADULT (HCC): ICD-10-CM

## 2023-02-06 DIAGNOSIS — F32.A ANXIETY AND DEPRESSION: ICD-10-CM

## 2023-02-06 DIAGNOSIS — Z79.4 TYPE 2 DIABETES MELLITUS WITH DIABETIC POLYNEUROPATHY, WITH LONG-TERM CURRENT USE OF INSULIN (HCC): Primary | ICD-10-CM

## 2023-02-06 DIAGNOSIS — J44.1 CHRONIC OBSTRUCTIVE PULMONARY DISEASE WITH ACUTE EXACERBATION (HCC): ICD-10-CM

## 2023-02-06 DIAGNOSIS — E11.42 TYPE 2 DIABETES MELLITUS WITH DIABETIC POLYNEUROPATHY, WITH LONG-TERM CURRENT USE OF INSULIN (HCC): Primary | ICD-10-CM

## 2023-02-06 DIAGNOSIS — F41.9 ANXIETY AND DEPRESSION: ICD-10-CM

## 2023-02-06 DIAGNOSIS — M54.42 CHRONIC BILATERAL LOW BACK PAIN WITH BILATERAL SCIATICA: ICD-10-CM

## 2023-02-06 DIAGNOSIS — B37.31 VAGINAL YEAST INFECTION: ICD-10-CM

## 2023-02-06 DIAGNOSIS — G89.29 CHRONIC BILATERAL LOW BACK PAIN WITH BILATERAL SCIATICA: ICD-10-CM

## 2023-02-06 LAB — HBA1C MFR BLD: 14 %

## 2023-02-06 PROCEDURE — 3046F HEMOGLOBIN A1C LEVEL >9.0%: CPT | Performed by: NURSE PRACTITIONER

## 2023-02-06 PROCEDURE — 3023F SPIROM DOC REV: CPT | Performed by: NURSE PRACTITIONER

## 2023-02-06 PROCEDURE — G8399 PT W/DXA RESULTS DOCUMENT: HCPCS | Performed by: NURSE PRACTITIONER

## 2023-02-06 PROCEDURE — 1123F ACP DISCUSS/DSCN MKR DOCD: CPT | Performed by: NURSE PRACTITIONER

## 2023-02-06 PROCEDURE — G8417 CALC BMI ABV UP PARAM F/U: HCPCS | Performed by: NURSE PRACTITIONER

## 2023-02-06 PROCEDURE — 1090F PRES/ABSN URINE INCON ASSESS: CPT | Performed by: NURSE PRACTITIONER

## 2023-02-06 PROCEDURE — 99214 OFFICE O/P EST MOD 30 MIN: CPT | Performed by: NURSE PRACTITIONER

## 2023-02-06 PROCEDURE — 1036F TOBACCO NON-USER: CPT | Performed by: NURSE PRACTITIONER

## 2023-02-06 PROCEDURE — 2022F DILAT RTA XM EVC RTNOPTHY: CPT | Performed by: NURSE PRACTITIONER

## 2023-02-06 PROCEDURE — 3074F SYST BP LT 130 MM HG: CPT | Performed by: NURSE PRACTITIONER

## 2023-02-06 PROCEDURE — 3017F COLORECTAL CA SCREEN DOC REV: CPT | Performed by: NURSE PRACTITIONER

## 2023-02-06 PROCEDURE — 83036 HEMOGLOBIN GLYCOSYLATED A1C: CPT | Performed by: NURSE PRACTITIONER

## 2023-02-06 PROCEDURE — G8482 FLU IMMUNIZE ORDER/ADMIN: HCPCS | Performed by: NURSE PRACTITIONER

## 2023-02-06 PROCEDURE — 3079F DIAST BP 80-89 MM HG: CPT | Performed by: NURSE PRACTITIONER

## 2023-02-06 PROCEDURE — G8427 DOCREV CUR MEDS BY ELIG CLIN: HCPCS | Performed by: NURSE PRACTITIONER

## 2023-02-06 RX ORDER — FLUCONAZOLE 150 MG/1
150 TABLET ORAL
Qty: 3 TABLET | Refills: 0 | Status: SHIPPED | OUTPATIENT
Start: 2023-02-06 | End: 2023-02-13

## 2023-02-06 RX ORDER — TIZANIDINE 2 MG/1
2 TABLET ORAL EVERY 12 HOURS PRN
Qty: 20 TABLET | Refills: 0 | Status: SHIPPED | OUTPATIENT
Start: 2023-02-06

## 2023-02-06 RX ORDER — BUPROPION HYDROCHLORIDE 150 MG/1
150 TABLET ORAL EVERY MORNING
Qty: 30 TABLET | Refills: 2 | Status: SHIPPED | OUTPATIENT
Start: 2023-02-06

## 2023-02-06 SDOH — ECONOMIC STABILITY: INCOME INSECURITY: HOW HARD IS IT FOR YOU TO PAY FOR THE VERY BASICS LIKE FOOD, HOUSING, MEDICAL CARE, AND HEATING?: NOT HARD AT ALL

## 2023-02-06 SDOH — ECONOMIC STABILITY: HOUSING INSECURITY
IN THE LAST 12 MONTHS, WAS THERE A TIME WHEN YOU DID NOT HAVE A STEADY PLACE TO SLEEP OR SLEPT IN A SHELTER (INCLUDING NOW)?: NO

## 2023-02-06 SDOH — ECONOMIC STABILITY: FOOD INSECURITY: WITHIN THE PAST 12 MONTHS, THE FOOD YOU BOUGHT JUST DIDN'T LAST AND YOU DIDN'T HAVE MONEY TO GET MORE.: NEVER TRUE

## 2023-02-06 SDOH — ECONOMIC STABILITY: FOOD INSECURITY: WITHIN THE PAST 12 MONTHS, YOU WORRIED THAT YOUR FOOD WOULD RUN OUT BEFORE YOU GOT MONEY TO BUY MORE.: NEVER TRUE

## 2023-02-06 ASSESSMENT — ENCOUNTER SYMPTOMS
BACK PAIN: 1
ABDOMINAL PAIN: 0
CHEST TIGHTNESS: 0
SHORTNESS OF BREATH: 1
VOMITING: 0
NAUSEA: 0
WHEEZING: 0
APNEA: 1

## 2023-02-06 NOTE — PROGRESS NOTES
[address] [facility]  [id number] [address]  Dept: [phone]  Dept Fax: [phone]    Valerie Mendoza (:  1950) is a 67 y.o. female,Established patient, here for evaluation of the following chief complaint(s):  Diabetes (3 month follow up)    Valerie Mendoza is a 65-year-old female here today for routine follow-up for multiple conditions such as chronic obstructive lung disease, type 2 diabetes, and chronic back pain. Patient established with pain management on 2023. Is receiving Percocet through pain management. Last A1c at 13.9 for type 2 diabetes. ASSESSMENT/PLAN:  1. Type 2 diabetes mellitus with diabetic polyneuropathy, with long-term current use of insulin (HCC)  -     POCT glycosylated hemoglobin (Hb A1C)  -     SITagliptin (JANUVIA) 50 MG tablet; Take 1 tablet by mouth daily, Disp-30 tablet, R-2Normal  2. Chronic obstructive pulmonary disease with acute exacerbation ([facility]  3. Morbid obesity with BMI of 40.0-44.9, adult ([facility] [address]  4. Chronic bilateral low back pain with bilateral sciatica  -     tiZANidine (ZANAFLEX) 2 MG tablet; Take 1 tablet by mouth every 12 hours as needed (spasms), Disp-20 tablet, R-0Normal  5. Anxiety and depression  -     buPROPion (WELLBUTRIN XL) 150 MG extended release tablet; Take 1 tablet by mouth every morning, Disp-30 tablet, R-2Normal  6. Vaginal yeast infection  -     fluconazole (DIFLUCAN) 150 MG tablet; Take 1 tablet by mouth every 72 hours for 7 days, Disp-3 tablet, R-0Normal    Worsening depression, currently on Lexapro 20 mg. We will add Wellbutrin today for improved control depression symptoms      Return for Diabetes. Subjective   SUBJECTIVE/OBJECTIVE:  FBS running 200-300 last few weeks  Taking all mediations including mealtime insulin, eating 2 meals a day. Admtis she does eat meat with meals, also having \"lots of pasta\".  Will snack on fruits  Denies N/V/D    Asking for a muscle relaxer, flexeril. Worked after she was given in the ER. Lots of leg cramps on both sides. Admits she lost most recent percocet script, had a hole in coat pocket. Did call and speak to pain management, they are aware. Has not started physical therapy. Rm Alvarez admits she struggling more with her depression, crying more often. The anniversary of her son's suicide was last week. Denies SI. Admits to struggling, lacking motivation in general.  Not cooking and eating more TV dinners. Suspects she has another vaginal infection, complaining of itching and discomfort as well as some increased vaginal discharge      Review of Systems   Constitutional:  Positive for activity change and fatigue. Negative for chills, fever and unexpected weight change. HENT: Negative. Respiratory:  Positive for apnea (sleep apnea) and shortness of breath (on exertion). Negative for chest tightness and wheezing. Cardiovascular:  Positive for leg swelling. Negative for chest pain and palpitations. Gastrointestinal:  Negative for abdominal pain, nausea and vomiting. Endocrine: Positive for polyuria. Genitourinary:  Positive for vaginal discharge. Negative for dysuria, hematuria and vaginal bleeding. Musculoskeletal:  Positive for arthralgias, back pain, gait problem and joint swelling. Skin:  Negative for rash and wound. Allergic/Immunologic: Positive for environmental allergies. Neurological:  Positive for numbness. Negative for headaches. Psychiatric/Behavioral:  Positive for dysphoric mood. Negative for sleep disturbance and suicidal ideas. The patient is not nervous/anxious.          Objective     DIAGNOSTIC FINDINGS:  CBC:  Lab Results   Component Value Date/Time    WBC 7.6 12/28/2022 02:09 PM    HGB 15.4 12/28/2022 02:09 PM     12/28/2022 02:09 PM     03/08/2012 06:13 AM       BMP:    Lab Results   Component Value Date/Time     12/28/2022 02:09 PM    K 4.7 12/28/2022 02:09 PM    CL 94 12/28/2022 02:09 PM    CO2 25 12/28/2022 02:09 PM    BUN 21 12/28/2022 02:09 PM    CREATININE 1.22 12/28/2022 02:09 PM    GLUCOSE 341 12/28/2022 02:09 PM    GLUCOSE 123 03/08/2012 06:13 AM       HEMOGLOBIN A1C:   Lab Results   Component Value Date/Time    LABA1C 14.0 02/06/2023 10:05 AM       FASTING LIPID PANEL:  Lab Results   Component Value Date    CHOL 211 (H) 03/09/2022    HDL 43 03/09/2022    TRIG 202 (H) 03/09/2022       Physical Exam  Vitals and nursing note reviewed. Constitutional:       General: She is not in acute distress. Appearance: Normal appearance. She is obese. HENT:      Head: Normocephalic. Right Ear: External ear normal.      Left Ear: External ear normal.      Nose: Nose normal. No congestion or rhinorrhea. Mouth/Throat:      Mouth: Mucous membranes are moist.      Pharynx: Oropharynx is clear. Eyes:      General: No scleral icterus. Conjunctiva/sclera: Conjunctivae normal.      Pupils: Pupils are equal, round, and reactive to light. Cardiovascular:      Rate and Rhythm: Normal rate and regular rhythm. Pulses: Normal pulses. Heart sounds: Normal heart sounds. No murmur heard. Pulmonary:      Effort: Pulmonary effort is normal. No respiratory distress. Breath sounds: Normal breath sounds. No wheezing. Abdominal:      Palpations: Abdomen is soft. There is no mass. Tenderness: There is no abdominal tenderness. Musculoskeletal:         General: No swelling or signs of injury. Normal range of motion. Cervical back: Normal range of motion. Skin:     General: Skin is warm and dry. Capillary Refill: Capillary refill takes less than 2 seconds. Neurological:      General: No focal deficit present. Mental Status: She is alert and oriented to person, place, and time. Mental status is at baseline. Motor: No weakness.       Gait: Gait normal.   Psychiatric:         Behavior: Behavior normal.          An electronic signature was used to authenticate this note.     --Erin Mendoza, APRN - CNP

## 2023-02-14 ENCOUNTER — HOSPITAL ENCOUNTER (OUTPATIENT)
Dept: PAIN MANAGEMENT | Age: 73
Discharge: HOME | End: 2023-02-14
Payer: MEDICARE

## 2023-02-14 VITALS
DIASTOLIC BLOOD PRESSURE: 80 MMHG | HEART RATE: 84 BPM | TEMPERATURE: 97.3 F | RESPIRATION RATE: 20 BRPM | SYSTOLIC BLOOD PRESSURE: 156 MMHG | OXYGEN SATURATION: 91 %

## 2023-02-14 DIAGNOSIS — M54.16 LUMBAR RADICULOPATHY, CHRONIC: ICD-10-CM

## 2023-02-14 DIAGNOSIS — G89.29 CHRONIC BILATERAL LOW BACK PAIN WITH BILATERAL SCIATICA: Primary | Chronic | ICD-10-CM

## 2023-02-14 DIAGNOSIS — M46.1 SACROILIITIS, NOT ELSEWHERE CLASSIFIED (HCC): Chronic | ICD-10-CM

## 2023-02-14 DIAGNOSIS — M54.42 CHRONIC BILATERAL LOW BACK PAIN WITH BILATERAL SCIATICA: Primary | Chronic | ICD-10-CM

## 2023-02-14 DIAGNOSIS — M50.30 DDD (DEGENERATIVE DISC DISEASE), CERVICAL: Chronic | ICD-10-CM

## 2023-02-14 DIAGNOSIS — M54.41 CHRONIC BILATERAL LOW BACK PAIN WITH BILATERAL SCIATICA: Primary | Chronic | ICD-10-CM

## 2023-02-14 DIAGNOSIS — M47.817 LUMBOSACRAL SPONDYLOSIS WITHOUT MYELOPATHY: Chronic | ICD-10-CM

## 2023-02-14 PROCEDURE — 99213 OFFICE O/P EST LOW 20 MIN: CPT

## 2023-02-14 PROCEDURE — 99214 OFFICE O/P EST MOD 30 MIN: CPT | Performed by: NURSE PRACTITIONER

## 2023-02-14 RX ORDER — OXYCODONE HYDROCHLORIDE AND ACETAMINOPHEN 5; 325 MG/1; MG/1
1 TABLET ORAL EVERY 8 HOURS PRN
Qty: 90 TABLET | Refills: 0 | Status: SHIPPED | OUTPATIENT
Start: 2023-02-18 | End: 2023-03-20

## 2023-02-14 ASSESSMENT — ENCOUNTER SYMPTOMS
SHORTNESS OF BREATH: 0
BOWEL INCONTINENCE: 0
BACK PAIN: 1
COUGH: 0
CONSTIPATION: 0

## 2023-02-14 NOTE — PROGRESS NOTES
Chief Complaint   Patient presents with    Back Pain     Medication Refill        [facility]     Pt c/o low back pain that radiates down legs. MRI done in 10- with multilevel degenerative changes and Right paracentral disc extrusion L4-5 narrowing the right lateral recess. She has never had a lumbar surgery and not interested in seeing NS for opinion. Patient states that she had a bad experience with an injection in the past and is not interested in any interventional pain procedures  She was to start PT this past summer but was admitted to hospital - agrees to start once pain back under control    Pt believes she lost pills d/t hole in her pocket after picking them up from pharmacy        HPI:     Back Pain  This is a chronic problem. The current episode started more than 1 year ago. The problem occurs constantly. The problem is unchanged. The pain is present in the lumbar spine. The quality of the pain is described as cramping, aching, burning and stabbing. The pain radiates to the right thigh, right knee and right foot. The pain is at a severity of 10/10. The pain is severe. The pain is The same all the time. The symptoms are aggravated by bending, coughing, lying down, position, sitting, standing and twisting. Stiffness is present All day. Associated symptoms include headaches, leg pain and weakness. Pertinent negatives include no bladder incontinence, bowel incontinence, chest pain, fever, numbness or tingling. Risk factors include menopause, obesity, poor posture, sedentary lifestyle and lack of exercise. She has tried analgesics, bed rest and muscle relaxant for the symptoms. The treatment provided mild relief. Patient denies any new neurological symptoms. No bowel or bladder incontinence, no weakness, and no falling.     Pill count: OxyCodone- patient stated she lost her bottle of pills 2/18    Morphine equivalent: 22.5    Controlled Substance Monitoring:    Acute and Chronic Pain Monitoring: RX Monitoring 2/14/2023   Attestation -   Acute Pain Prescriptions -   Periodic Controlled Substance Monitoring Possible medication side effects, risk of tolerance/dependence & alternative treatments discussed. ;No signs of potential drug abuse or diversion identified. ;Assessed functional status. ;Obtaining appropriate analgesic effect of treatment. Chronic Pain > 50 MEDD -   Chronic Pain > 80 MEDD -          Periodic Controlled Substance Monitoring: Possible medication side effects, risk of tolerance/dependence & alternative treatments discussed., No signs of potential drug abuse or diversion identified. , Assessed functional status., Obtaining appropriate analgesic effect of treatment. Vincent Louis, APRN - CNP)      Past Medical History:   Diagnosis Date    Abdominal bloating 1/26/2021    Adenomatous polyp of ascending colon 1/26/2021    Allergic rhinitis     Anxiety 7/17/2013    Arthritis     Asthma     Atrial fibrillation (HCC)     Back pain     NERVE/DR. GRACIA    Benign hypertension with CKD (chronic kidney disease) stage III (HCC)     CAD (coronary artery disease) 3/21/2013    Caffeine use     2 coffee/day    CHF (congestive heart failure) (HCC)     Chronic kidney disease     CKD (chronic kidney disease) stage 3, GFR 30-59 ml/min (HCC)     COPD (chronic obstructive pulmonary disease) (HCC)     emphysema    Degeneration of lumbar or lumbosacral intervertebral disc     Depression     DM (diabetes mellitus) ([facility] [address]     Emphysema of lung (HCC)     Gastritis     GERD (gastroesophageal reflux disease)     Hearing loss     Hematuria     Hiatal hernia     HTN (hypertension), benign 6/28/2014    Hypertriglyceridemia     Incontinence of urine 9/25/2013    Irritable bowel syndrome with both constipation and diarrhea 1/26/2021    Knee arthropathy     Lumbosacral spondylosis without myelopathy 9/29/2014    Migraine headache     DR. GRACIA    Mumps     Neuropathy     Obesity     On home oxygen therapy     2 L PER NC  HS AND PRN    HIGINIO on CPAP     Otitis media 1-26-15    Secondary diabetes mellitus with stage 3 chronic kidney disease (GFR 30-59) (HCC)     Stroke (HCC)      mini stroke. Tinnitus     Type 2 diabetes mellitus without complication (HCC)     Type II or unspecified type diabetes mellitus without mention of complication, not stated as uncontrolled     UTI (lower urinary tract infection)     Varicose vein        Past Surgical History:   Procedure Laterality Date    ABLATION OF DYSRHYTHMIC FOCUS  2000    CARPAL TUNNEL RELEASE Right     CATARACT REMOVAL WITH IMPLANT Bilateral     CHOLECYSTECTOMY  2007    COLONOSCOPY      COLONOSCOPY  04/10/2017    tubular adenomo polyp , mod. sigmoid diverticulosis, min. int. hemorrhoids    COLONOSCOPY N/A 3/2/2021    COLONOSCOPY WITH BIOPSY performed by Chiquis Mccabe MD at [address] [place] [address]  9/25/2013    DILATION AND CURETTAGE  1979    EYE SURGERY      laser    INCONTINENCE SURGERY      Percutaneous nerve stimulation Dr Waleska Amaya 2013     INSERTABLE CARDIAC MONITOR  12/04/2015    [employer] REVEAL LINQ MODEL #[id number] MRI CONDTIONAL OK 3T. IMMEDIATELY POST IMPLANT    OTHER SURGICAL HISTORY  09/10/15    removal of interstim    [place] COLSC FLX W/REMOVAL LESION BY HOT BX FORCEPS N/A 4/10/2017    COLONOSCOPY POLYPECTOMY HOT BIOPSY performed by Gonzales Kramer MD at [place] [address]  08/21/2017    UPPER GASTROINTESTINAL ENDOSCOPY  03/2016    Dr Raúl Tan ENDOSCOPY N/A 3/2/2021    EGD BIOPSY performed by Chiquis Mccabe MD at [facility]       Allergies   Allergen Reactions    Robitussin [Guaifenesin] Anaphylaxis    Codeine Swelling    Compazine [Prochlorperazine Maleate] Hives and Swelling    Iodides Itching    Morphine Other (See Comments)     hallucinations    Moxifloxacin      Other reaction(s): Intolerance-unknown  Other reaction(s):  Intolerance-unknown    Reglan [Metoclopramide] Nausea Only    Avelox [Moxifloxacin Hcl In Nacl] Rash     Dermatitis      Cipro Xr Rash     dermatitis    Sulfa Antibiotics Rash         Current Outpatient Medications:     [START ON 2/18/2023] oxyCODONE-acetaminophen (PERCOCET) 5-325 MG per tablet, Take 1 tablet by mouth every 8 hours as needed for Pain for up to 30 days. Intended supply: 30 days. , Disp: 90 tablet, Rfl: 0    tiZANidine (ZANAFLEX) 2 MG tablet, Take 1 tablet by mouth every 12 hours as needed (spasms), Disp: 20 tablet, Rfl: 0    buPROPion (WELLBUTRIN XL) 150 MG extended release tablet, Take 1 tablet by mouth every morning, Disp: 30 tablet, Rfl: 2    SITagliptin (JANUVIA) 50 MG tablet, Take 1 tablet by mouth daily, Disp: 30 tablet, Rfl: 2    blood glucose test strips (ONETOUCH ULTRA) strip, TEST TWO (2) TO THREE (3) TIMES A DAY& AS NEEDED, Disp: 100 strip, Rfl: 0    carvedilol (COREG) 3.125 MG tablet, TAKE 1 TAB BY MOUTH TWICE A DAY ( IN THE MORNING AND BEDTIME ), Disp: 180 tablet, Rfl: 3    metFORMIN (GLUCOPHAGE) 1000 MG tablet, TAKE 1 TABLET BY MOUTH 2 TIMES DAILY (WITH MEALS), Disp: 60 tablet, Rfl: 2    oxybutynin (DITROPAN-XL) 5 MG extended release tablet, TAKE 1 TABLET BY MOUTH DAILY, Disp: 30 tablet, Rfl: 2    losartan (COZAAR) 25 MG tablet, TAKE 1 TABLET BY MOUTH ONCE DAILY, Disp: 30 tablet, Rfl: 9    ULTICARE MINI PEN NEEDLES 31G X 6 MM MISC, , Disp: , Rfl:     metoclopramide (REGLAN) 10 MG tablet, Take 1 tablet by mouth 4 times daily WARNING:  May cause drowsiness. May impair ability to operate vehicles or machinery.   Do not use in combination with alcohol., Disp: 20 tablet, Rfl: 0    escitalopram (LEXAPRO) 20 MG tablet, Take 20 mg by mouth daily, Disp: , Rfl:     docusate sodium (COLACE) 100 MG capsule, TAKE 1 CAPSULE BY MOUTH TWICE A DAY, Disp: 60 capsule, Rfl: 2    isosorbide dinitrate (ISORDIL) 30 MG tablet, TAKE 1 TABLET BY MOUTH ONCE DAILY, Disp: 30 tablet, Rfl: 2    pantoprazole (PROTONIX) 40 MG tablet, TAKE 1 TABLET BY MOUTH TWICE A DAY, Disp: 60 tablet, Rfl: 2    clopidogrel (PLAVIX) 75 MG tablet, TAKE 1 TAB BY MOUTH ONCE A DAY ( IN THE MORNING ) -THIS MEDICINE MAY BE TAKENWITH OR WITHOUT FOOD, Disp: 90 tablet, Rfl: 1    fenofibrate (TRIGLIDE) 160 MG tablet, TAKE 1 TABLET BY MOUTH DAILY, Disp: 90 tablet, Rfl: 1    TRULICITY 1.5 QG/8.9VN SC injection, INJECT ONE AND A HALF (1 & 1/2) MG INTO THE SKIN ONCE A WEEK STOP 0.75, Disp: 4 Adjustable Dose Pre-filled Pen Syringe, Rfl: 11    furosemide (LASIX) 20 MG tablet, TAKE 1 TABLET BY MOUTH ONCE DAILY AS NEEDED, Disp: 30 tablet, Rfl: 0    budesonide-formoterol (SYMBICORT) 160-4.5 MCG/ACT AERO, Inhale 2 puffs into the lungs in the morning and 2 puffs before bedtime.  As needed for sob., Disp: 10.2 g, Rfl: 2    vitamin B-12 (CYANOCOBALAMIN) 100 MCG tablet, take half a tablet BY MOUTH ONCE DAILY, Disp: 30 tablet, Rfl: 0    Melatonin 10 MG TABS, Take 10 mg by mouth nightly, Disp: 90 tablet, Rfl: 3    dicyclomine (BENTYL) 10 MG capsule, TAKE 1 CAPSULE BY MOUTH TWICE A DAY AS NEEDED FOR ABDOMINAL SPASMS, Disp: 60 capsule, Rfl: 0    SM ASPIRIN ADULT LOW STRENGTH 81 MG EC tablet, TAKE 1 TABLET BY MOUTH ONCE DAILY, Disp: 90 tablet, Rfl: 4    FEROSUL 325 (65 Fe) MG tablet, TAKE 1 TAB BY MOUTH IN THE MORNING, Disp: 90 tablet, Rfl: 2    insulin aspart (NOVOLOG FLEXPEN) 100 UNIT/ML injection pen, IF<139 NO INSULIN; 140-199-2 UN;200-249-4 UN;250-299-6 UN;300-349-8 UN;350-400=10 UN;ABOVE 400-12 UNIT(S), Disp: 10 mL, Rfl: 3    magnesium oxide (MAG-OX) 400 MG tablet, Take 400 mg by mouth 2 times daily, Disp: , Rfl:     insulin glargine (BASAGLAR KWIKPEN) 100 UNIT/ML injection pen, Inject 55 Units into the skin 2 times daily, Disp: 10 mL, Rfl: 2    Multiple Vitamins-Minerals (CERTAVITE/ANTIOXIDANTS) TABS, TAKE 1 TABLET BY MOUTH ONCE DAILY, Disp: 30 tablet, Rfl: 5    atorvastatin (LIPITOR) 40 MG tablet, Take 1 tablet by mouth daily, Disp: 90 tablet, Rfl: 3    albuterol (PROVENTIL) (2.5 MG/3ML) 0.083% nebulizer solution, Take 3 mLs by nebulization every 6 hours as needed for Wheezing, Disp: 120 each, Rfl: 3    OXYGEN, Inhale 3 L into the lungs , Disp: , Rfl:     Family History   Problem Relation Age of Onset    Diabetes Mother     Heart Disease Mother     Stomach Cancer Father     Diabetes Maternal Grandmother         also aunts and uncles (maternal)    Emphysema Sister        Social History     Socioeconomic History    Marital status: Legally      Spouse name: Not on file    Number of children: Not on file    Years of education: Not on file    Highest education level: Not on file   Occupational History    Occupation: disabled     Employer: N/A   Tobacco Use    Smoking status: Former     Packs/day: 3.00     Years: 41.00     Pack years: 123.00     Types: Cigarettes     Quit date: 2000     Years since quittin.1    Smokeless tobacco: Never    Tobacco comments:     quit in    Vaping Use    Vaping Use: Never used   Substance and Sexual Activity    Alcohol use: No     Alcohol/week: 0.0 standard drinks    Drug use: No    Sexual activity: Not Currently   Other Topics Concern    Not on file   Social History Narrative    Not on file     Social Determinants of Health     Financial Resource Strain: Low Risk     Difficulty of Paying Living Expenses: Not hard at all   Food Insecurity: No Food Insecurity    Worried About Running Out of Food in the Last Year: Never true    [address] in the Last Year: Never true   Transportation Needs: Unknown    Lack of Transportation (Medical): Not on file    Lack of Transportation (Non-Medical):  No   Physical Activity: Not on file   Stress: Not on file   Social Connections: Not on file   Intimate Partner Violence: Not on file   Housing Stability: Unknown    Unable to Pay for Housing in the Last Year: Not on file    Number of Places Lived in the Last Year: Not on file    Unstable Housing in the Last Year: No       Review of Systems:  Review of Systems   Constitutional: Negative for chills and fever. Cardiovascular:  Negative for chest pain. Respiratory:  Negative for cough and shortness of breath. Musculoskeletal:  Positive for back pain. Gastrointestinal:  Negative for bowel incontinence and constipation. Genitourinary:  Negative for bladder incontinence. Neurological:  Positive for headaches and weakness. Negative for numbness and tingling. Physical Exam:  BP (!) 156/80   Pulse 84   Temp 97.3 °F (36.3 °C)   Resp 20   LMP  (LMP Unknown)   SpO2 91%     Physical Exam  Cardiovascular:      Rate and Rhythm: Normal rate. Pulmonary:      Effort: Pulmonary effort is normal.   Musculoskeletal:         General: Normal range of motion. Comments: Gait is not assessed, pt in scooter for visit    Skin:     General: Skin is warm and dry. Neurological:      Mental Status: She is alert and oriented to person, place, and time.        Record/Diagnostics Review:    Last greg 2/22  and was appropriate     Assessment:  Problem List Items Addressed This Visit       Chronic bilateral low back pain with bilateral sciatica - Primary (Chronic)    Relevant Medications    oxyCODONE-acetaminophen (PERCOCET) 5-325 MG per tablet (Start on 2/18/2023)    Other Relevant Orders    Ambulatory referral to Physical Therapy    DDD (degenerative disc disease), cervical (Chronic)    Relevant Medications    oxyCODONE-acetaminophen (PERCOCET) 5-325 MG per tablet (Start on 2/18/2023)    Lumbosacral spondylosis without myelopathy (Chronic)    Relevant Medications    oxyCODONE-acetaminophen (PERCOCET) 5-325 MG per tablet (Start on 2/18/2023)    Other Relevant Orders    Ambulatory referral to Physical Therapy    Sacroiliitis, not elsewhere classified ([address] (Chronic)    Relevant Medications    oxyCODONE-acetaminophen (PERCOCET) 5-325 MG per tablet (Start on 2/18/2023)    Other Relevant Orders    Ambulatory referral to Physical Therapy    Lumbar radiculopathy, chronic    Relevant Medications oxyCODONE-acetaminophen (PERCOCET) 5-325 MG per tablet (Start on 2/18/2023)    Other Relevant Orders    Ambulatory referral to Physical Therapy          Treatment Plan:  Patient relates current medications are helping the pain. Patient reports taking pain medications as prescribed, denies obtaining medications from different sources and denies use of illegal drugs. Medication risk and benefits have been discussed. Patient denies side effects from medications like nausea, vomiting, constipation or drowsiness. Patient reports current activities of daily living are possible due to medications and would like to continue them. As always, we encourage daily stretching and strengthening exercises, and recommend minimizing use of pain medications unless patient cannot get through daily activities due to pain. Due to the high risk nature of this patient's pain medication close monitoring is required. Continue current medication management, pt has been stable and compliant. Script written for percocet  Referral to physical therapy to work on core strengthening and back strengthening   UDS next OV - pt out of meds for 3 weeks - lost them  Follow up appointment made for 4 weeks    I have reviewed the chief complaint and history of present illness (including ROS and PFSH) and vital documentation by my staff and I agree with their documentation and have added where applicable.

## 2023-02-24 RX ORDER — ESCITALOPRAM OXALATE 20 MG/1
TABLET ORAL
Qty: 90 TABLET | Refills: 1 | Status: SHIPPED | OUTPATIENT
Start: 2023-02-24

## 2023-03-14 ENCOUNTER — HOSPITAL ENCOUNTER (OUTPATIENT)
Dept: PAIN MANAGEMENT | Age: 73
Discharge: HOME | End: 2023-03-14
Payer: MEDICARE

## 2023-03-14 VITALS — BODY MASS INDEX: 42.14 KG/M2 | TEMPERATURE: 97.3 F | WEIGHT: 229 LBS | HEIGHT: 62 IN

## 2023-03-14 DIAGNOSIS — M50.30 DDD (DEGENERATIVE DISC DISEASE), CERVICAL: Chronic | ICD-10-CM

## 2023-03-14 DIAGNOSIS — G89.29 CHRONIC BILATERAL LOW BACK PAIN WITH BILATERAL SCIATICA: Primary | Chronic | ICD-10-CM

## 2023-03-14 DIAGNOSIS — M46.1 SACROILIITIS, NOT ELSEWHERE CLASSIFIED (HCC): Chronic | ICD-10-CM

## 2023-03-14 DIAGNOSIS — Z79.891 CHRONIC USE OF OPIATE FOR THERAPEUTIC PURPOSE: ICD-10-CM

## 2023-03-14 DIAGNOSIS — M54.42 CHRONIC BILATERAL LOW BACK PAIN WITH BILATERAL SCIATICA: Primary | Chronic | ICD-10-CM

## 2023-03-14 DIAGNOSIS — M54.16 LUMBAR RADICULOPATHY, CHRONIC: ICD-10-CM

## 2023-03-14 DIAGNOSIS — M47.817 LUMBOSACRAL SPONDYLOSIS WITHOUT MYELOPATHY: Chronic | ICD-10-CM

## 2023-03-14 DIAGNOSIS — M54.41 CHRONIC BILATERAL LOW BACK PAIN WITH BILATERAL SCIATICA: Primary | Chronic | ICD-10-CM

## 2023-03-14 PROCEDURE — 99214 OFFICE O/P EST MOD 30 MIN: CPT | Performed by: NURSE PRACTITIONER

## 2023-03-14 PROCEDURE — G0481 DRUG TEST DEF 8-14 CLASSES: HCPCS

## 2023-03-14 PROCEDURE — 99213 OFFICE O/P EST LOW 20 MIN: CPT

## 2023-03-14 PROCEDURE — 80307 DRUG TEST PRSMV CHEM ANLYZR: CPT

## 2023-03-14 RX ORDER — TOPIRAMATE 100 MG/1
100 TABLET, FILM COATED ORAL NIGHTLY
Status: DISCONTINUED | OUTPATIENT
Start: 2023-03-14 | End: 2023-03-15 | Stop reason: HOSPADM

## 2023-03-14 RX ORDER — OXYCODONE HYDROCHLORIDE AND ACETAMINOPHEN 5; 325 MG/1; MG/1
1 TABLET ORAL EVERY 8 HOURS PRN
Qty: 90 TABLET | Refills: 0 | Status: SHIPPED | OUTPATIENT
Start: 2023-03-20 | End: 2023-04-19

## 2023-03-14 RX ORDER — TOPIRAMATE 100 MG/1
100 TABLET, FILM COATED ORAL NIGHTLY
Qty: 90 TABLET | Refills: 2 | Status: SHIPPED | OUTPATIENT
Start: 2023-03-14

## 2023-03-14 ASSESSMENT — ENCOUNTER SYMPTOMS
BACK PAIN: 1
SHORTNESS OF BREATH: 0
COUGH: 0
BOWEL INCONTINENCE: 0
CONSTIPATION: 0

## 2023-03-14 ASSESSMENT — PAIN SCALES - GENERAL: PAINLEVEL_OUTOF10: 7

## 2023-03-14 NOTE — PROGRESS NOTES
Chief Complaint   Patient presents with    Back Pain     Med refill         PMH     Pt c/o low back pain that radiates down legs. MRI done in 10- with multilevel degenerative changes and Right paracentral disc extrusion L4-5 narrowing the right lateral recess. She has never had a lumbar surgery and not interested in seeing NS for opinion. Patient states that she had a bad experience with an injection in the past and is not interested in any interventional pain procedures    HPI:     Back Pain  This is a chronic problem. The current episode started more than 1 year ago. The problem occurs constantly. The problem is unchanged. The pain is present in the lumbar spine. The pain radiates to the left foot, left thigh, left knee, right foot, right knee and right thigh. The pain is at a severity of 7/10. The pain is The same all the time. The symptoms are aggravated by bending, sitting and standing. Associated symptoms include headaches. Pertinent negatives include no bladder incontinence, bowel incontinence, chest pain, fever, numbness, tingling or weakness. She has tried ice and heat for the symptoms. Patient denies any new neurological symptoms. No bowel or bladder incontinence, no weakness, and no falling. Pill count: appropriate / Oxycodone - 15 3/20    Morphine equivalent: 22.5    Controlled Substance Monitoring:    Acute and Chronic Pain Monitoring:   RX Monitoring 3/14/2023   Attestation -   Acute Pain Prescriptions -   Periodic Controlled Substance Monitoring Possible medication side effects, risk of tolerance/dependence & alternative treatments discussed. ;No signs of potential drug abuse or diversion identified. ;Assessed functional status. ;Obtaining appropriate analgesic effect of treatment.    Chronic Pain > 50 MEDD -   Chronic Pain > 80 MEDD -          Periodic Controlled Substance Monitoring: Possible medication side effects, risk of tolerance/dependence & alternative treatments discussed., No signs of potential drug abuse or diversion identified. , Assessed functional status., Obtaining appropriate analgesic effect of treatment. Nahomy Delgado, APRN - CNP)      Past Medical History:   Diagnosis Date    Abdominal bloating 1/26/2021    Adenomatous polyp of ascending colon 1/26/2021    Allergic rhinitis     Anxiety 7/17/2013    Arthritis     Asthma     Atrial fibrillation ([facility])     Back pain     NERVE/DR. GRACIA    Benign hypertension with CKD (chronic kidney disease) stage III ([facility])     CAD (coronary artery disease) 3/21/2013    Caffeine use     2 coffee/day    CHF (congestive heart failure) ([facility])     Chronic kidney disease     CKD (chronic kidney disease) stage 3, GFR 30-59 ml/min ([facility])     COPD (chronic obstructive pulmonary disease) ([facility])     emphysema    Degeneration of lumbar or lumbosacral intervertebral disc     Depression     DM (diabetes mellitus) ([facility] [address]     Emphysema of lung ([facility])     Gastritis     GERD (gastroesophageal reflux disease)     Hearing loss     Hematuria     Hiatal hernia     HTN (hypertension), benign 6/28/2014    Hypertriglyceridemia     Incontinence of urine 9/25/2013    Irritable bowel syndrome with both constipation and diarrhea 1/26/2021    Knee arthropathy     Lumbosacral spondylosis without myelopathy 9/29/2014    Migraine headache     DR. GRACIA    Mumps     Neuropathy     Obesity     On home oxygen therapy     2 L PER NC  HS AND PRN    HIGINIO on CPAP     Otitis media 1-26-15    Secondary diabetes mellitus with stage 3 chronic kidney disease (GFR 30-59) ([facility])     Stroke ([facility])      mini stroke.     Tinnitus     Type 2 diabetes mellitus without complication ([facility])     Type II or unspecified type diabetes mellitus without mention of complication, not stated as uncontrolled     UTI (lower urinary tract infection)     Varicose vein        Past Surgical History:   Procedure Laterality Date    ABLATION OF DYSRHYTHMIC FOCUS  2000    CARPAL TUNNEL RELEASE Right     CATARACT REMOVAL WITH IMPLANT Bilateral     CHOLECYSTECTOMY  2007    COLONOSCOPY      COLONOSCOPY  04/10/2017    tubular adenomo polyp , mod. sigmoid diverticulosis, min. int. hemorrhoids    COLONOSCOPY N/A 3/2/2021    COLONOSCOPY WITH BIOPSY performed by Evelyn Stephens MD at [address] [place] [address]  9/25/2013    DILATION AND CURETTAGE  1979    EYE SURGERY      laser    INCONTINENCE SURGERY      Percutaneous nerve stimulation Dr Claribel Amaya 2013     INSERTABLE CARDIAC MONITOR  12/04/2015    [employer] REVEAL LINQ MODEL #[id number] MRI CONDTIONAL OK 3T. IMMEDIATELY POST IMPLANT    OTHER SURGICAL HISTORY  09/10/15    removal of interstim    DC COLSC FLX W/REMOVAL LESION BY HOT BX FORCEPS N/A 4/10/2017    COLONOSCOPY POLYPECTOMY HOT BIOPSY performed by Joyce Damon MD at [place] [id number]  08/21/2017    UPPER GASTROINTESTINAL ENDOSCOPY  03/2016    [facility]A 3/2/2021    EGD BIOPSY performed by Evelyn Stephens MD at [facility]       Allergies   Allergen Reactions    Robitussin [Guaifenesin] Anaphylaxis    Codeine Swelling    Compazine [Prochlorperazine Maleate] Hives and Swelling    Iodides Itching    Morphine Other (See Comments)     hallucinations    Moxifloxacin      Other reaction(s): Intolerance-unknown  Other reaction(s): Intolerance-unknown    Reglan [Metoclopramide] Nausea Only    Avelox [Moxifloxacin Hcl In Nacl] Rash     Dermatitis      Cipro Xr Rash     dermatitis    Sulfa Antibiotics Rash         Current Outpatient Medications:     escitalopram (LEXAPRO) 20 MG tablet, TAKE 1 TABLET BY MOUTH IN THE MORNING., Disp: 90 tablet, Rfl: 1    oxyCODONE-acetaminophen (PERCOCET) 5-325 MG per tablet, Take 1 tablet by mouth every 8 hours as needed for Pain for up to 30 days. Intended supply: 30 days. , Disp: 90 tablet, Rfl: 0    tiZANidine (ZANAFLEX) 2 MG tablet, Take 1 tablet by mouth every 12 hours as needed (spasms), Disp: 20 tablet, Rfl: 0    buPROPion (WELLBUTRIN XL) 150 MG extended release tablet, Take 1 tablet by mouth every morning, Disp: 30 tablet, Rfl: 2    SITagliptin (JANUVIA) 50 MG tablet, Take 1 tablet by mouth daily, Disp: 30 tablet, Rfl: 2    blood glucose test strips (ONETOUCH ULTRA) strip, TEST TWO (2) TO THREE (3) TIMES A DAY& AS NEEDED, Disp: 100 strip, Rfl: 0    carvedilol (COREG) 3.125 MG tablet, TAKE 1 TAB BY MOUTH TWICE A DAY ( IN THE MORNING AND BEDTIME ), Disp: 180 tablet, Rfl: 3    metFORMIN (GLUCOPHAGE) 1000 MG tablet, TAKE 1 TABLET BY MOUTH 2 TIMES DAILY (WITH MEALS), Disp: 60 tablet, Rfl: 2    oxybutynin (DITROPAN-XL) 5 MG extended release tablet, TAKE 1 TABLET BY MOUTH DAILY, Disp: 30 tablet, Rfl: 2    losartan (COZAAR) 25 MG tablet, TAKE 1 TABLET BY MOUTH ONCE DAILY, Disp: 30 tablet, Rfl: 9    ULTICARE MINI PEN NEEDLES 31G X 6 MM MISC, , Disp: , Rfl:     metoclopramide (REGLAN) 10 MG tablet, Take 1 tablet by mouth 4 times daily WARNING:  May cause drowsiness. May impair ability to operate vehicles or machinery.   Do not use in combination with alcohol., Disp: 20 tablet, Rfl: 0    docusate sodium (COLACE) 100 MG capsule, TAKE 1 CAPSULE BY MOUTH TWICE A DAY, Disp: 60 capsule, Rfl: 2    isosorbide dinitrate (ISORDIL) 30 MG tablet, TAKE 1 TABLET BY MOUTH ONCE DAILY, Disp: 30 tablet, Rfl: 2    pantoprazole (PROTONIX) 40 MG tablet, TAKE 1 TABLET BY MOUTH TWICE A DAY, Disp: 60 tablet, Rfl: 2    clopidogrel (PLAVIX) 75 MG tablet, TAKE 1 TAB BY MOUTH ONCE A DAY ( IN THE MORNING ) -THIS MEDICINE MAY BE TAKENWITH OR WITHOUT FOOD, Disp: 90 tablet, Rfl: 1    fenofibrate (TRIGLIDE) 160 MG tablet, TAKE 1 TABLET BY MOUTH DAILY, Disp: 90 tablet, Rfl: 1    TRULICITY 1.5 CA/9.0CW SC injection, INJECT ONE AND A HALF (1 & 1/2) MG INTO THE SKIN ONCE A WEEK STOP 0.75, Disp: 4 Adjustable Dose Pre-filled Pen Syringe, Rfl: 11    furosemide (LASIX) 20 MG tablet, TAKE 1 TABLET BY MOUTH ONCE DAILY AS NEEDED, Disp: 30 tablet, Rfl: 0    budesonide-formoterol (SYMBICORT) 160-4.5 MCG/ACT AERO, Inhale 2 puffs into the lungs in the morning and 2 puffs before bedtime.  As needed for sob., Disp: 10.2 g, Rfl: 2    vitamin B-12 (CYANOCOBALAMIN) 100 MCG tablet, take half a tablet BY MOUTH ONCE DAILY, Disp: 30 tablet, Rfl: 0    Melatonin 10 MG TABS, Take 10 mg by mouth nightly, Disp: 90 tablet, Rfl: 3    dicyclomine (BENTYL) 10 MG capsule, TAKE 1 CAPSULE BY MOUTH TWICE A DAY AS NEEDED FOR ABDOMINAL SPASMS, Disp: 60 capsule, Rfl: 0    SM ASPIRIN ADULT LOW STRENGTH 81 MG EC tablet, TAKE 1 TABLET BY MOUTH ONCE DAILY, Disp: 90 tablet, Rfl: 4    FEROSUL 325 (65 Fe) MG tablet, TAKE 1 TAB BY MOUTH IN THE MORNING, Disp: 90 tablet, Rfl: 2    insulin aspart (NOVOLOG FLEXPEN) 100 UNIT/ML injection pen, IF<139 NO INSULIN; 140-199-2 UN;200-249-4 UN;250-299-6 UN;300-349-8 UN;350-400=10 UN;ABOVE 400-12 UNIT(S), Disp: 10 mL, Rfl: 3    magnesium oxide (MAG-OX) 400 MG tablet, Take 400 mg by mouth 2 times daily, Disp: , Rfl:     insulin glargine (BASAGLAR KWIKPEN) 100 UNIT/ML injection pen, Inject 55 Units into the skin 2 times daily, Disp: 10 mL, Rfl: 2    Multiple Vitamins-Minerals (CERTAVITE/ANTIOXIDANTS) TABS, TAKE 1 TABLET BY MOUTH ONCE DAILY, Disp: 30 tablet, Rfl: 5    atorvastatin (LIPITOR) 40 MG tablet, Take 1 tablet by mouth daily, Disp: 90 tablet, Rfl: 3    albuterol (PROVENTIL) (2.5 MG/3ML) 0.083% nebulizer solution, Take 3 mLs by nebulization every 6 hours as needed for Wheezing, Disp: 120 each, Rfl: 3    OXYGEN, Inhale 3 L into the lungs , Disp: , Rfl:     Family History   Problem Relation Age of Onset    Diabetes Mother     Heart Disease Mother     Stomach Cancer Father     Diabetes Maternal Grandmother         also aunts and uncles (maternal)    Emphysema Sister        Social History     Socioeconomic History    Marital status: Legally      Spouse name: Not on file    Number of children: Not on file    Years of education: Not on file    Highest education level: Not on file   Occupational History    Occupation: disabled     Employer: N/A   Tobacco Use    Smoking status: Former     Packs/day: 3.00     Years: 41.00     Pack years: 123.00     Types: Cigarettes     Quit date: 2000     Years since quittin.2    Smokeless tobacco: Never    Tobacco comments:     quit in    Vaping Use    Vaping Use: Never used   Substance and Sexual Activity    Alcohol use: No     Alcohol/week: 0.0 standard drinks    Drug use: No    Sexual activity: Not Currently   Other Topics Concern    Not on file   Social History Narrative    Not on file     Social Determinants of Health     Financial Resource Strain: Low Risk     Difficulty of Paying Living Expenses: Not hard at all   Food Insecurity: No Food Insecurity    Worried About [address] in the Last Year: Never true    [address] [employer] in the Last Year: Never true   Transportation Needs: Unknown    Lack of Transportation (Medical): Not on file    Lack of Transportation (Non-Medical): No   Physical Activity: Not on file   Stress: Not on file   Social Connections: Not on file   Intimate Partner Violence: Not on file   Housing Stability: Unknown    Unable to Pay for Housing in the Last Year: Not on file    Number of Places Lived in the Last Year: Not on file    Unstable Housing in the Last Year: No       Review of Systems:  Review of Systems   Constitutional: Negative for chills and fever. Cardiovascular:  Negative for chest pain. Respiratory:  Negative for cough and shortness of breath. Musculoskeletal:  Positive for back pain. Gastrointestinal:  Negative for bowel incontinence and constipation. Genitourinary:  Negative for bladder incontinence. Neurological:  Positive for headaches. Negative for numbness, tingling and weakness.      Physical Exam:  Temp 97.3 °F (36.3 °C)   Ht 5' 2\" (1.575 m)   Wt 229 lb (103.9 kg)   LMP  (LMP Unknown)   BMI 41.88 kg/m²     Physical Exam  Cardiovascular:      Rate and Rhythm: Normal rate. Pulmonary:      Effort: Pulmonary effort is normal.   Musculoskeletal:         General: Normal range of motion. Comments: Using scooter    Skin:     General: Skin is warm and dry. Neurological:      Mental Status: She is alert and oriented to person, place, and time. Record/Diagnostics Review:    Last melania 2/22 and was appropriate     Assessment:  Problem List Items Addressed This Visit       Chronic bilateral low back pain with bilateral sciatica - Primary (Chronic)    Chronic use of opiate for therapeutic purpose    DDD (degenerative disc disease), cervical (Chronic)    Lumbosacral spondylosis without myelopathy (Chronic)          Treatment Plan:  Patient relates current medications are helping the pain. Patient reports taking pain medications as prescribed, denies obtaining medications from different sources and denies use of illegal drugs. Medication risk and benefits have been discussed. Patient denies side effects from medications like nausea, vomiting, constipation or drowsiness. Patient reports current activities of daily living are possible due to medications and would like to continue them. As always, we encourage daily stretching and strengthening exercises, and recommend minimizing use of pain medications unless patient cannot get through daily activities due to pain. Due to the high risk nature of this patient's pain medication close monitoring is required. Continue current medication management, pt has been stable and compliant. Script written for percocet  MELANIA obtained today for monitoring purposes. Last dose of medication was today  Follow up appointment made for 4 weeks    I have reviewed the chief complaint and history of present illness (including ROS and PFSH) and vital documentation by my staff and I agree with their documentation and have added where applicable.

## 2023-03-18 LAB
6-ACETYLMORPHINE, UR: NOT DETECTED
7-AMINOCLONAZEPAM, URINE: NOT DETECTED
ALPHA-OH-ALPRAZ, URINE: NOT DETECTED
ALPHA-OH-MIDAZOLAM, URINE: NOT DETECTED
ALPRAZOLAM, URINE: NOT DETECTED
AMPHETAMINES, URINE: NOT DETECTED
BARBITURATES, URINE: NOT DETECTED
BENZOYLECGONINE, UR: NOT DETECTED
BUPRENORPHINE URINE: NOT DETECTED
CARISOPRODOL, UR: NOT DETECTED
CLONAZEPAM, URINE: NOT DETECTED
CODEINE, URINE: NOT DETECTED
CREATININE URINE: 82.2 MG/DL (ref 20–400)
DIAZEPAM, URINE: NOT DETECTED
DRUGS EXPECTED, UR: NORMAL
EER HI RES INTERP UR: NORMAL
ETHYL GLUCURONIDE UR: NOT DETECTED
FENTANYL URINE: NOT DETECTED
GABAPENTIN: NOT DETECTED
HYDROCODONE, URINE: NOT DETECTED
HYDROMORPHONE, URINE: NOT DETECTED
LORAZEPAM, URINE: NOT DETECTED
MARIJUANA METAB, UR: NOT DETECTED
MDA, UR: NOT DETECTED
MDEA, EVE, UR: NOT DETECTED
MDMA URINE: NOT DETECTED
MEPERIDINE METAB, UR: NOT DETECTED
METHADONE, URINE: NOT DETECTED
METHAMPHETAMINE, URINE: NOT DETECTED
METHYLPHENIDATE: NOT DETECTED
MIDAZOLAM, URINE: NOT DETECTED
MORPHINE URINE: NOT DETECTED
NALOXONE URINE: NOT DETECTED
NORBUPRENORPHINE, URINE: NOT DETECTED
NORDIAZEPAM, URINE: NOT DETECTED
NORFENTANYL, URINE: NOT DETECTED
NORHYDROCODONE, URINE: NOT DETECTED
NOROXYCODONE, URINE: PRESENT
NOROXYMORPHONE, URINE: NOT DETECTED
OXAZEPAM, URINE: NOT DETECTED
OXYCODONE URINE: PRESENT
OXYMORPHONE, URINE: NOT DETECTED
PAIN MANAGEMENT DRUG PANEL INTERP, URINE: NORMAL
PAIN MGT DRUG PANEL, HI RES, UR: NORMAL
PCP,URINE: NOT DETECTED
PHENTERMINE, UR: NOT DETECTED
PREGABALIN: NOT DETECTED
TAPENTADOL, URINE: NOT DETECTED
TAPENTADOL-O-SULFATE, URINE: NOT DETECTED
TEMAZEPAM, URINE: NOT DETECTED
TRAMADOL, URINE: NOT DETECTED
ZOLPIDEM METABOLITE (ZCA), URINE: NOT DETECTED
ZOLPIDEM, URINE: NOT DETECTED

## 2023-03-24 DIAGNOSIS — I25.10 CORONARY ARTERY DISEASE DUE TO LIPID RICH PLAQUE: ICD-10-CM

## 2023-03-24 DIAGNOSIS — I25.83 CORONARY ARTERY DISEASE DUE TO LIPID RICH PLAQUE: ICD-10-CM

## 2023-03-24 RX ORDER — ISOSORBIDE DINITRATE 30 MG/1
TABLET ORAL
Qty: 30 TABLET | Refills: 11 | Status: SHIPPED | OUTPATIENT
Start: 2023-03-24

## 2023-03-24 RX ORDER — PANTOPRAZOLE SODIUM 40 MG/1
TABLET, DELAYED RELEASE ORAL
Qty: 60 TABLET | Refills: 11 | Status: SHIPPED | OUTPATIENT
Start: 2023-03-24

## 2023-03-24 NOTE — TELEPHONE ENCOUNTER
Please Approve or Refuse.   Send to Pharmacy per Pt's Request:      Next Visit Date:  05/08/2023  Last Visit Date: 02/06/2023    Hemoglobin A1C (%)   Date Value   02/06/2023 14.0   10/13/2022 13.9   07/08/2022 11.8             ( goal A1C is < 7)   BP Readings from Last 3 Encounters:   02/14/23 (!) 156/80   02/06/23 119/80   01/12/23 125/74          (goal 120/80)  BUN   Date Value Ref Range Status   12/28/2022 21 8 - 23 mg/dL Final     Creatinine   Date Value Ref Range Status   12/28/2022 1.22 (H) 0.50 - 0.90 mg/dL Final     Potassium   Date Value Ref Range Status   12/28/2022 4.7 3.7 - 5.3 mmol/L Final

## 2023-03-27 DIAGNOSIS — D64.9 ANEMIA, UNSPECIFIED TYPE: ICD-10-CM

## 2023-03-27 DIAGNOSIS — E78.2 MIXED HYPERLIPIDEMIA: Chronic | ICD-10-CM

## 2023-03-27 RX ORDER — PANTOPRAZOLE SODIUM 40 MG/1
TABLET, DELAYED RELEASE ORAL
Qty: 60 TABLET | Refills: 2 | OUTPATIENT
Start: 2023-03-27

## 2023-03-27 RX ORDER — FERROUS SULFATE 325(65) MG
TABLET ORAL
Qty: 90 TABLET | Refills: 2 | Status: SHIPPED | OUTPATIENT
Start: 2023-03-27

## 2023-03-27 NOTE — TELEPHONE ENCOUNTER
Please Approve or Refuse.   Send to Pharmacy per Pt's Request: [facility]      Next Visit Date:  Visit date not found   Last Visit Date: 5/10/2022    Hemoglobin A1C (%)   Date Value   02/06/2023 14.0   10/13/2022 13.9   07/08/2022 11.8             ( goal A1C is < 7)   BP Readings from Last 3 Encounters:   02/14/23 (!) 156/80   02/06/23 119/80   01/12/23 125/74          (goal 120/80)  BUN   Date Value Ref Range Status   12/28/2022 21 8 - 23 mg/dL Final     Creatinine   Date Value Ref Range Status   12/28/2022 1.22 (H) 0.50 - 0.90 mg/dL Final     Potassium   Date Value Ref Range Status   12/28/2022 4.7 3.7 - 5.3 mmol/L Final

## 2023-03-28 RX ORDER — ATORVASTATIN CALCIUM 40 MG/1
TABLET, FILM COATED ORAL
Qty: 30 TABLET | Refills: 6 | Status: SHIPPED | OUTPATIENT
Start: 2023-03-28

## 2023-03-31 ENCOUNTER — TELEPHONE (OUTPATIENT)
Dept: PHARMACY | Facility: CLINIC | Age: 73
End: 2023-03-31

## 2023-03-31 NOTE — TELEPHONE ENCOUNTER
No      Systolic Blood Pressure: 084 mmHg      Is BP treated: Yes      HDL Cholesterol: 43 mg/dL      Total Cholesterol: 211 mg/dL     PLAN  Per insurer report, [employer]-Partial Subsidy - 15% co-insurance, regardless of the days' supply dispensed - in cases where the plan's co-pays are less (ie $0), the lesser applies    Outcomes portal down at time of encounter    The following are interventions that have been identified:   Spoke with pharmacy, atorvastatin received and was placed in patients pill packs    No further outreach at this time    Last Visit: 23  Next Visit: 23    For Pharmacy Admin Tracking Only    Program: [address] in place:  No  Recommendation Provided To: Pharmacy: 1  Intervention Detail: Adherence Monitorin  Intervention Accepted By: Pharmacy: 1  Gap Closed?: Yes   Time Spent (min): Subha Gallardo MA.    [facility] free: [phone]

## 2023-04-17 ENCOUNTER — HOSPITAL ENCOUNTER (OUTPATIENT)
Dept: PAIN MANAGEMENT | Age: 73
Discharge: HOME | End: 2023-04-17
Payer: MEDICARE

## 2023-04-17 VITALS
WEIGHT: 230 LBS | HEIGHT: 62 IN | OXYGEN SATURATION: 90 % | BODY MASS INDEX: 42.33 KG/M2 | DIASTOLIC BLOOD PRESSURE: 70 MMHG | TEMPERATURE: 97.3 F | SYSTOLIC BLOOD PRESSURE: 122 MMHG | HEART RATE: 89 BPM | RESPIRATION RATE: 20 BRPM

## 2023-04-17 DIAGNOSIS — M47.817 LUMBOSACRAL SPONDYLOSIS WITHOUT MYELOPATHY: Chronic | ICD-10-CM

## 2023-04-17 DIAGNOSIS — M50.30 DDD (DEGENERATIVE DISC DISEASE), CERVICAL: Chronic | ICD-10-CM

## 2023-04-17 DIAGNOSIS — M54.16 LUMBAR RADICULOPATHY, CHRONIC: ICD-10-CM

## 2023-04-17 DIAGNOSIS — G89.29 CHRONIC BILATERAL LOW BACK PAIN WITH BILATERAL SCIATICA: Chronic | ICD-10-CM

## 2023-04-17 DIAGNOSIS — M46.1 SACROILIITIS, NOT ELSEWHERE CLASSIFIED (HCC): Chronic | ICD-10-CM

## 2023-04-17 DIAGNOSIS — M51.36 DDD (DEGENERATIVE DISC DISEASE), LUMBAR: Primary | ICD-10-CM

## 2023-04-17 DIAGNOSIS — M54.41 CHRONIC BILATERAL LOW BACK PAIN WITH BILATERAL SCIATICA: Chronic | ICD-10-CM

## 2023-04-17 DIAGNOSIS — Z79.891 CHRONIC USE OF OPIATE FOR THERAPEUTIC PURPOSE: ICD-10-CM

## 2023-04-17 DIAGNOSIS — M54.42 CHRONIC BILATERAL LOW BACK PAIN WITH BILATERAL SCIATICA: Chronic | ICD-10-CM

## 2023-04-17 PROCEDURE — 99213 OFFICE O/P EST LOW 20 MIN: CPT | Performed by: NURSE PRACTITIONER

## 2023-04-17 PROCEDURE — 99213 OFFICE O/P EST LOW 20 MIN: CPT

## 2023-04-17 RX ORDER — OXYCODONE HYDROCHLORIDE AND ACETAMINOPHEN 5; 325 MG/1; MG/1
1 TABLET ORAL EVERY 8 HOURS PRN
Qty: 90 TABLET | Refills: 0 | Status: SHIPPED | OUTPATIENT
Start: 2023-04-19 | End: 2023-05-19

## 2023-04-17 ASSESSMENT — ENCOUNTER SYMPTOMS
BOWEL INCONTINENCE: 0
SHORTNESS OF BREATH: 0
COUGH: 0
BACK PAIN: 1
CONSTIPATION: 0

## 2023-04-17 ASSESSMENT — PAIN SCALES - GENERAL: PAINLEVEL_OUTOF10: 8

## 2023-04-17 ASSESSMENT — PAIN DESCRIPTION - LOCATION: LOCATION: BACK

## 2023-04-17 NOTE — PROGRESS NOTES
magnesium oxide (MAG-OX) 400 MG tablet, Take 1 tablet by mouth 2 times daily, Disp: , Rfl:     insulin glargine (BASAGLAR KWIKPEN) 100 UNIT/ML injection pen, Inject 55 Units into the skin 2 times daily, Disp: 10 mL, Rfl: 2    Multiple Vitamins-Minerals (CERTAVITE/ANTIOXIDANTS) TABS, TAKE 1 TABLET BY MOUTH ONCE DAILY, Disp: 30 tablet, Rfl: 5    albuterol (PROVENTIL) (2.5 MG/3ML) 0.083% nebulizer solution, Take 3 mLs by nebulization every 6 hours as needed for Wheezing, Disp: 120 each, Rfl: 3    OXYGEN, Inhale 3 L into the lungs , Disp: , Rfl:     Family History   Problem Relation Age of Onset    Diabetes Mother     Heart Disease Mother     Stomach Cancer Father     Diabetes Maternal Grandmother         also aunts and uncles (maternal)    Emphysema Sister        Social History     Socioeconomic History    Marital status: Legally      Spouse name: Not on file    Number of children: Not on file    Years of education: Not on file    Highest education level: Not on file   Occupational History    Occupation: disabled     Employer: N/A   Tobacco Use    Smoking status: Former     Packs/day: 3.00     Years: 41.00     Pack years: 123.00     Types: Cigarettes     Quit date: 2000     Years since quittin.3    Smokeless tobacco: Never    Tobacco comments:     quit in    Vaping Use    Vaping Use: Never used   Substance and Sexual Activity    Alcohol use: No     Alcohol/week: 0.0 standard drinks    Drug use: No    Sexual activity: Not Currently   Other Topics Concern    Not on file   Social History Narrative    Not on file     Social Determinants of Health     Financial Resource Strain: Low Risk     Difficulty of Paying Living Expenses: Not hard at all   Food Insecurity: No Food Insecurity    Worried About Running Out of Food in the Last Year: Never true    Ran Out of Food in the Last Year: Never true   Transportation Needs: Unknown    Lack of Transportation (Medical):  Not on file    Lack of Transportation
risk nature of this patient's pain medication close monitoring is required. Continue current medication management, pt has been stable and compliant. Script written for percocet  Follow up appointment made for 4 weeks    I have reviewed the chief complaint and history of present illness (including ROS and PFSH) and vital documentation by my staff and I agree with their documentation and have added where applicable.

## 2023-04-21 ENCOUNTER — APPOINTMENT (OUTPATIENT)
Dept: ULTRASOUND IMAGING | Age: 73
End: 2023-04-21
Payer: MEDICARE

## 2023-04-21 ENCOUNTER — TELEPHONE (OUTPATIENT)
Dept: PRIMARY CARE CLINIC | Age: 73
End: 2023-04-21

## 2023-04-21 ENCOUNTER — APPOINTMENT (OUTPATIENT)
Dept: GENERAL RADIOLOGY | Age: 73
End: 2023-04-21
Payer: MEDICARE

## 2023-04-21 ENCOUNTER — HOSPITAL ENCOUNTER (EMERGENCY)
Age: 73
Discharge: HOME | End: 2023-04-21
Attending: EMERGENCY MEDICINE
Payer: MEDICARE

## 2023-04-21 VITALS
SYSTOLIC BLOOD PRESSURE: 136 MMHG | OXYGEN SATURATION: 93 % | TEMPERATURE: 98.1 F | WEIGHT: 250 LBS | DIASTOLIC BLOOD PRESSURE: 76 MMHG | HEART RATE: 78 BPM | HEIGHT: 62 IN | BODY MASS INDEX: 46.01 KG/M2 | RESPIRATION RATE: 18 BRPM

## 2023-04-21 DIAGNOSIS — N39.0 URINARY TRACT INFECTION WITH HEMATURIA, SITE UNSPECIFIED: Primary | ICD-10-CM

## 2023-04-21 DIAGNOSIS — B96.89 BACTERIAL VAGINOSIS: ICD-10-CM

## 2023-04-21 DIAGNOSIS — R39.9 UTI SYMPTOMS: Primary | ICD-10-CM

## 2023-04-21 DIAGNOSIS — F32.A ANXIETY AND DEPRESSION: ICD-10-CM

## 2023-04-21 DIAGNOSIS — N76.0 BACTERIAL VAGINOSIS: ICD-10-CM

## 2023-04-21 DIAGNOSIS — E11.42 TYPE 2 DIABETES MELLITUS WITH DIABETIC POLYNEUROPATHY, WITH LONG-TERM CURRENT USE OF INSULIN (HCC): ICD-10-CM

## 2023-04-21 DIAGNOSIS — F41.9 ANXIETY AND DEPRESSION: ICD-10-CM

## 2023-04-21 DIAGNOSIS — Z79.4 TYPE 2 DIABETES MELLITUS WITH DIABETIC POLYNEUROPATHY, WITH LONG-TERM CURRENT USE OF INSULIN (HCC): ICD-10-CM

## 2023-04-21 DIAGNOSIS — R31.9 URINARY TRACT INFECTION WITH HEMATURIA, SITE UNSPECIFIED: Primary | ICD-10-CM

## 2023-04-21 LAB
ABSOLUTE EOS #: 0.13 K/UL (ref 0–0.44)
ABSOLUTE IMMATURE GRANULOCYTE: 0.04 K/UL (ref 0–0.3)
ABSOLUTE LYMPH #: 1.75 K/UL (ref 1.1–3.7)
ABSOLUTE MONO #: 0.71 K/UL (ref 0.1–1.2)
ALBUMIN SERPL-MCNC: 3.9 G/DL (ref 3.5–5.2)
ALBUMIN/GLOBULIN RATIO: 1.2 (ref 1–2.5)
ALP SERPL-CCNC: 74 U/L (ref 35–104)
ALT SERPL-CCNC: 32 U/L (ref 5–33)
ANION GAP SERPL CALCULATED.3IONS-SCNC: 17 MMOL/L (ref 9–17)
AST SERPL-CCNC: 43 U/L
BACTERIA: ABNORMAL
BASOPHILS # BLD: 1 % (ref 0–2)
BASOPHILS ABSOLUTE: 0.08 K/UL (ref 0–0.2)
BILIRUB SERPL-MCNC: 0.5 MG/DL (ref 0.3–1.2)
BILIRUBIN URINE: NEGATIVE
BNP SERPL-MCNC: 104 PG/ML
BUN SERPL-MCNC: 14 MG/DL (ref 8–23)
CALCIUM SERPL-MCNC: 9.6 MG/DL (ref 8.6–10.4)
CANDIDA SPECIES, DNA PROBE: NEGATIVE
CASTS UA: ABNORMAL /LPF (ref 0–8)
CHLORIDE SERPL-SCNC: 92 MMOL/L (ref 98–107)
CO2 SERPL-SCNC: 21 MMOL/L (ref 20–31)
COLOR: YELLOW
CREAT SERPL-MCNC: 0.91 MG/DL (ref 0.5–0.9)
EOSINOPHILS RELATIVE PERCENT: 2 % (ref 1–4)
EPITHELIAL CELLS UA: ABNORMAL /HPF (ref 0–5)
GARDNERELLA VAGINALIS, DNA PROBE: POSITIVE
GFR SERPL CREATININE-BSD FRML MDRD: >60 ML/MIN/1.73M2
GLUCOSE BLD-MCNC: 372 MG/DL (ref 65–105)
GLUCOSE SERPL-MCNC: 391 MG/DL (ref 70–99)
GLUCOSE UR STRIP.AUTO-MCNC: ABNORMAL MG/DL
HCT VFR BLD AUTO: 45.2 % (ref 36.3–47.1)
HGB BLD-MCNC: 15.1 G/DL (ref 11.9–15.1)
IMMATURE GRANULOCYTES: 1 %
KETONES UR STRIP.AUTO-MCNC: ABNORMAL MG/DL
LEUKOCYTE ESTERASE UR QL STRIP.AUTO: ABNORMAL
LYMPHOCYTES # BLD: 23 % (ref 24–43)
MCH RBC QN AUTO: 30.6 PG (ref 25.2–33.5)
MCHC RBC AUTO-ENTMCNC: 33.4 G/DL (ref 28.4–34.8)
MCV RBC AUTO: 91.7 FL (ref 82.6–102.9)
MONOCYTES # BLD: 9 % (ref 3–12)
NITRITE UR QL STRIP.AUTO: NEGATIVE
NRBC AUTOMATED: 0 PER 100 WBC
PDW BLD-RTO: 12.4 % (ref 11.8–14.4)
PLATELET # BLD AUTO: 199 K/UL (ref 138–453)
PMV BLD AUTO: 12.2 FL (ref 8.1–13.5)
POTASSIUM SERPL-SCNC: 4.3 MMOL/L (ref 3.7–5.3)
PROT SERPL-MCNC: 7.2 G/DL (ref 6.4–8.3)
PROT UR STRIP.AUTO-MCNC: 5.5 MG/DL (ref 5–8)
PROT UR STRIP.AUTO-MCNC: NEGATIVE MG/DL
RBC # BLD: 4.93 M/UL (ref 3.95–5.11)
RBC CLUMPS #/AREA URNS AUTO: ABNORMAL /HPF (ref 0–4)
SEG NEUTROPHILS: 64 % (ref 36–65)
SEGMENTED NEUTROPHILS ABSOLUTE COUNT: 4.96 K/UL (ref 1.5–8.1)
SODIUM SERPL-SCNC: 130 MMOL/L (ref 135–144)
SOURCE: ABNORMAL
SPECIFIC GRAVITY UA: 1.03 (ref 1–1.03)
TRICHOMONAS VAGINALIS DNA: NEGATIVE
TROPONIN I SERPL DL<=0.01 NG/ML-MCNC: 11 NG/L (ref 0–14)
TROPONIN I SERPL DL<=0.01 NG/ML-MCNC: 11 NG/L (ref 0–14)
TURBIDITY: ABNORMAL
URINE HGB: ABNORMAL
UROBILINOGEN, URINE: NORMAL
WBC # BLD AUTO: 7.7 K/UL (ref 3.5–11.3)
WBC UA: ABNORMAL /HPF (ref 0–5)

## 2023-04-21 PROCEDURE — 6370000000 HC RX 637 (ALT 250 FOR IP)

## 2023-04-21 PROCEDURE — 71046 X-RAY EXAM CHEST 2 VIEWS: CPT

## 2023-04-21 PROCEDURE — 84484 ASSAY OF TROPONIN QUANT: CPT

## 2023-04-21 PROCEDURE — 83880 ASSAY OF NATRIURETIC PEPTIDE: CPT

## 2023-04-21 PROCEDURE — 76830 TRANSVAGINAL US NON-OB: CPT

## 2023-04-21 PROCEDURE — 87660 TRICHOMONAS VAGIN DIR PROBE: CPT

## 2023-04-21 PROCEDURE — 99285 EMERGENCY DEPT VISIT HI MDM: CPT

## 2023-04-21 PROCEDURE — 85025 COMPLETE CBC W/AUTO DIFF WBC: CPT

## 2023-04-21 PROCEDURE — 87591 N.GONORRHOEAE DNA AMP PROB: CPT

## 2023-04-21 PROCEDURE — 87510 GARDNER VAG DNA DIR PROBE: CPT

## 2023-04-21 PROCEDURE — 87086 URINE CULTURE/COLONY COUNT: CPT

## 2023-04-21 PROCEDURE — 87491 CHLMYD TRACH DNA AMP PROBE: CPT

## 2023-04-21 PROCEDURE — 80053 COMPREHEN METABOLIC PANEL: CPT

## 2023-04-21 PROCEDURE — 96372 THER/PROPH/DIAG INJ SC/IM: CPT

## 2023-04-21 PROCEDURE — 93005 ELECTROCARDIOGRAM TRACING: CPT

## 2023-04-21 PROCEDURE — 82947 ASSAY GLUCOSE BLOOD QUANT: CPT

## 2023-04-21 PROCEDURE — 87480 CANDIDA DNA DIR PROBE: CPT

## 2023-04-21 PROCEDURE — 81001 URINALYSIS AUTO W/SCOPE: CPT

## 2023-04-21 PROCEDURE — 6370000000 HC RX 637 (ALT 250 FOR IP): Performed by: STUDENT IN AN ORGANIZED HEALTH CARE EDUCATION/TRAINING PROGRAM

## 2023-04-21 RX ORDER — CEPHALEXIN 500 MG/1
500 CAPSULE ORAL ONCE
Status: COMPLETED | OUTPATIENT
Start: 2023-04-21 | End: 2023-04-21

## 2023-04-21 RX ORDER — BUPROPION HYDROCHLORIDE 150 MG/1
150 TABLET ORAL EVERY MORNING
Qty: 30 TABLET | Refills: 2 | Status: SHIPPED | OUTPATIENT
Start: 2023-04-21

## 2023-04-21 RX ORDER — ONDANSETRON 4 MG/1
4 TABLET, FILM COATED ORAL EVERY 8 HOURS PRN
Qty: 10 TABLET | Refills: 0 | Status: SHIPPED | OUTPATIENT
Start: 2023-04-21

## 2023-04-21 RX ORDER — SITAGLIPTIN 50 MG/1
TABLET, FILM COATED ORAL
Qty: 30 TABLET | Refills: 2 | Status: SHIPPED | OUTPATIENT
Start: 2023-04-21

## 2023-04-21 RX ORDER — CEPHALEXIN 500 MG/1
500 CAPSULE ORAL 2 TIMES DAILY
Qty: 14 CAPSULE | Refills: 0 | Status: SHIPPED | OUTPATIENT
Start: 2023-04-21 | End: 2023-04-28

## 2023-04-21 RX ORDER — INSULIN LISPRO 100 [IU]/ML
6 INJECTION, SOLUTION INTRAVENOUS; SUBCUTANEOUS ONCE
Status: COMPLETED | OUTPATIENT
Start: 2023-04-21 | End: 2023-04-21

## 2023-04-21 RX ORDER — METRONIDAZOLE 500 MG/1
500 TABLET ORAL ONCE
Status: COMPLETED | OUTPATIENT
Start: 2023-04-21 | End: 2023-04-21

## 2023-04-21 RX ORDER — DIPHENHYDRAMINE HYDROCHLORIDE 50 MG/ML
25 INJECTION INTRAMUSCULAR; INTRAVENOUS ONCE
Status: DISCONTINUED | OUTPATIENT
Start: 2023-04-21 | End: 2023-04-21

## 2023-04-21 RX ORDER — METRONIDAZOLE 500 MG/1
500 TABLET ORAL 2 TIMES DAILY
Qty: 14 TABLET | Refills: 0 | Status: SHIPPED | OUTPATIENT
Start: 2023-04-21 | End: 2023-04-28

## 2023-04-21 RX ORDER — CEPHALEXIN 250 MG/1
250 CAPSULE ORAL 4 TIMES DAILY
Qty: 28 CAPSULE | Refills: 0 | Status: SHIPPED | OUTPATIENT
Start: 2023-04-21 | End: 2023-04-21

## 2023-04-21 RX ADMIN — METRONIDAZOLE 500 MG: 500 TABLET ORAL at 19:42

## 2023-04-21 RX ADMIN — CEPHALEXIN 500 MG: 500 CAPSULE ORAL at 19:42

## 2023-04-21 RX ADMIN — INSULIN LISPRO 6 UNITS: 100 INJECTION, SOLUTION INTRAVENOUS; SUBCUTANEOUS at 16:59

## 2023-04-21 ASSESSMENT — PAIN SCALES - GENERAL: PAINLEVEL_OUTOF10: 8

## 2023-04-21 ASSESSMENT — PAIN DESCRIPTION - DESCRIPTORS: DESCRIPTORS: CRAMPING

## 2023-04-21 ASSESSMENT — PAIN - FUNCTIONAL ASSESSMENT: PAIN_FUNCTIONAL_ASSESSMENT: 0-10

## 2023-04-21 ASSESSMENT — ENCOUNTER SYMPTOMS
SHORTNESS OF BREATH: 1
NAUSEA: 0
CHEST TIGHTNESS: 1
VOMITING: 0
ABDOMINAL PAIN: 1

## 2023-04-21 ASSESSMENT — PAIN DESCRIPTION - PAIN TYPE: TYPE: ACUTE PAIN

## 2023-04-21 ASSESSMENT — PAIN DESCRIPTION - FREQUENCY: FREQUENCY: CONTINUOUS

## 2023-04-21 ASSESSMENT — PAIN DESCRIPTION - LOCATION: LOCATION: ABDOMEN

## 2023-04-21 NOTE — ED TRIAGE NOTES
Pt to ED via ambulance c/o abdominal pain, vaginal bleeding, and shortness of breath. Pt states the vag bleeding and abdominal pain started 2 days ago. Pt asked her pcp for antibiotics for a uti. Pt states she has incontinence of the bladder, and she has noticed the blood in the toilet after she urinates. Pt states the abdominal pain is lower and is an 8/10. Pt states she has sob and sometimes wears 2 L o2 at home. Pt put on 2L nasal cannula on arrival.     Pt alert and oriented x4. Pt placed on full cardiac monitor. Pt in gown, call light in reach. Labs sent, ekg done. Vitals stable. Will continue to monitor.

## 2023-04-21 NOTE — DISCHARGE INSTRUCTIONS
Please take your medication as prescribed. Please call and schedule appointment with the OB/GYN clinic as soon as possible. Your ultrasound not show any masses or signs vaccination of the vaginal bleeding. You have severe worsening of your vaginal bleeding with lightheadedness please emergency part. Otherwise please also follow-up with her primary care provider as soon as possible. Do not drink alcohol to medications. Return emergency chest pain or shortness breath onset weakness slurred speech difficulty swallowing, severe abdominal pain, one-sided numbness or tingling or any other concern.

## 2023-04-21 NOTE — ED PROVIDER NOTES
Metabolic Panel   Result Value Ref Range    Glucose 391 (H) 70 - 99 mg/dL    BUN 14 8 - 23 mg/dL    Creatinine 0.91 (H) 0.50 - 0.90 mg/dL    Est, Glom Filt Rate >60 >60 mL/min/1.73m2    Calcium 9.6 8.6 - 10.4 mg/dL    Sodium 130 (L) 135 - 144 mmol/L    Potassium 4.3 3.7 - 5.3 mmol/L    Chloride 92 (L) 98 - 107 mmol/L    CO2 21 20 - 31 mmol/L    Anion Gap 17 9 - 17 mmol/L    Alkaline Phosphatase 74 35 - 104 U/L    ALT 32 5 - 33 U/L    AST 43 (H) <32 U/L    Total Bilirubin 0.5 0.3 - 1.2 mg/dL    Total Protein 7.2 6.4 - 8.3 g/dL    Albumin 3.9 3.5 - 5.2 g/dL    Albumin/Globulin Ratio 1.2 1.0 - 2.5   Urinalysis with Microscopic   Result Value Ref Range    Color, UA Yellow Yellow    Turbidity UA Cloudy (A) Clear    Glucose, Ur 3+ (A) NEGATIVE    Bilirubin Urine NEGATIVE NEGATIVE    Ketones, Urine SMALL (A) NEGATIVE    Specific Gravity, UA 1.033 (H) 1.005 - 1.030    Urine Hgb TRACE (A) NEGATIVE    pH, UA 5.5 5.0 - 8.0    Protein, UA NEGATIVE NEGATIVE    Urobilinogen, Urine Normal Normal    Nitrite, Urine NEGATIVE NEGATIVE    Leukocyte Esterase, Urine SMALL (A) NEGATIVE    WBC, UA 5 TO 10 0 - 5 /HPF    RBC, UA 0 TO 2 0 - 4 /HPF    Casts UA  0 - 8 /LPF     2 TO 5 HYALINE Reference range defined for non-centrifuged specimen. Epithelial Cells UA 5 TO 10 0 - 5 /HPF    Bacteria, UA FEW (A) None   Troponin   Result Value Ref Range    Troponin, High Sensitivity 11 0 - 14 ng/L   POC Glucose Fingerstick   Result Value Ref Range    POC Glucose 372 (H) 65 - 105 mg/dL       XR CHEST (2 VW)    Result Date: 4/21/2023  EXAMINATION: TWO XRAY VIEWS OF THE CHEST 4/21/2023 4:48 pm COMPARISON: 11/01/2022 HISTORY: Acute chest pain and shortness of breath. FINDINGS: Evaluation is suboptimal secondary to body habitus and portable technique. Borderline cardiomegaly. No acute airspace disease, pleural effusion, or pneumothorax. Cardiac loop recorder. No definite acute abnormality.      US NON OB TRANSVAGINAL    Result Date:
Specific Gravity, UA 1.033 (*)     Urine Hgb TRACE (*)     Leukocyte Esterase, Urine SMALL (*)     Bacteria, UA FEW (*)     All other components within normal limits   POC GLUCOSE FINGERSTICK - Abnormal; Notable for the following components:    POC Glucose 372 (*)     All other components within normal limits   CULTURE, URINE   C.TRACHOMATIS N.GONORRHOEAE DNA   BRAIN NATRIURETIC PEPTIDE   TROPONIN   TROPONIN       XR CHEST (2 VW)    Result Date: 4/21/2023  EXAMINATION: TWO XRAY VIEWS OF THE CHEST 4/21/2023 4:48 pm COMPARISON: 11/01/2022 HISTORY: Acute chest pain and shortness of breath. FINDINGS: Evaluation is suboptimal secondary to body habitus and portable technique. Borderline cardiomegaly. No acute airspace disease, pleural effusion, or pneumothorax. Cardiac loop recorder. No definite acute abnormality. RECENT VITALS:     Temp: 98.1 °F (36.7 °C),  Heart Rate: 78, Resp: 18, BP: 136/76, SpO2: 93 %    This patient is a 67 y.o. Female with vaginal bleeding. Post menopausal. Abdom pain. Labs, TVUS, reassess. Likely needs close follow up with GYN.     OUTSTANDING TASKS / RECOMMENDATIONS:    Labs, rad results      Mehran Ring MD, Angelica Zarate  Attending Emergency Physician  [facility]       Heladio Browne MD  04/21/23 7291
pelvis, reassessment      EKG Interpretation    Interpreted by me    Rhythm: normal sinus   Rate: normal  Axis: Left  Ectopy: none  Conduction: normal  ST Segments: no acute change  T Waves: no acute change  Q Waves: none    Clinical Impression: None specific EKG      Critical Care  None          Tamera Mackenzie MD    Attending Emergency Medicine Physician            Juregn Chaudhari MD  04/21/23 [address] Radha Villafuerte MD  04/21/23 5065
Packs/day: 3.00     Years: 41.00     Pack years: 123.00     Types: Cigarettes     Quit date: 2000     Years since quittin.3    Smokeless tobacco: Never    Tobacco comments:     quit in    Vaping Use    Vaping Use: Never used   Substance and Sexual Activity    Alcohol use: No     Alcohol/week: 0.0 standard drinks    Drug use: No    Sexual activity: Not Currently   Other Topics Concern    Not on file   Social History Narrative    Not on file     Social Determinants of Health     Financial Resource Strain: Low Risk     Difficulty of Paying Living Expenses: Not hard at all   Food Insecurity: No Food Insecurity    Worried About [address] in the Last Year: Never true    [address] [email or web address] in the Last Year: Never true   Transportation Needs: Unknown    Lack of Transportation (Medical): Not on file    Lack of Transportation (Non-Medical): No   Physical Activity: Not on file   Stress: Not on file   Social Connections: Not on file   Intimate Partner Violence: Not on file   Housing Stability: Unknown    Unable to Pay for Housing in the Last Year: Not on file    Number of Places Lived in the Last Year: Not on file    Unstable Housing in the Last Year: No       Family History   Problem Relation Age of Onset    Diabetes Mother     Heart Disease Mother     Stomach Cancer Father     Diabetes Maternal Grandmother         also aunts and uncles (maternal)    Emphysema Sister        Allergies:  Robitussin [guaifenesin], Codeine, Compazine [prochlorperazine maleate], Iodides, Morphine, Moxifloxacin, Reglan [metoclopramide], Avelox [moxifloxacin hcl in nacl], Cipro xr, and Sulfa antibiotics    Home Medications:  Prior to Admission medications    Medication Sig Start Date End Date Taking?  Authorizing Provider   escitalopram (LEXAPRO) 20 MG tablet TAKE 1 TABLET BY MOUTH IN THE MORNING. 23   Tanisha Haas APRN - CNP   buPROPion (WELLBUTRIN XL) 150 MG extended release tablet TAKE 1 TABLET BY MOUTH EVERY

## 2023-04-22 LAB
EKG ATRIAL RATE: 78 BPM
EKG P AXIS: 55 DEGREES
EKG P-R INTERVAL: 134 MS
EKG Q-T INTERVAL: 356 MS
EKG QRS DURATION: 70 MS
EKG QTC CALCULATION (BAZETT): 405 MS
EKG R AXIS: -40 DEGREES
EKG T AXIS: 84 DEGREES
EKG VENTRICULAR RATE: 78 BPM
MICROORGANISM SPEC CULT: NORMAL
SPECIMEN DESCRIPTION: NORMAL

## 2023-04-22 PROCEDURE — 93010 ELECTROCARDIOGRAM REPORT: CPT | Performed by: INTERNAL MEDICINE

## 2023-04-22 NOTE — ED NOTES
..Patient in need of transportation home. SW contacted [facility]. ETA unknown.  # [phone]     Sherri , [facility]  04/21/23 2027

## 2023-04-24 DIAGNOSIS — E78.2 MIXED HYPERLIPIDEMIA: ICD-10-CM

## 2023-04-24 LAB
C TRACH DNA SPEC QL PROBE+SIG AMP: NEGATIVE
N GONORRHOEA DNA SPEC QL PROBE+SIG AMP: NEGATIVE
SPECIMEN DESCRIPTION: NORMAL

## 2023-04-25 RX ORDER — FENOFIBRATE 160 MG/1
160 TABLET ORAL DAILY
Qty: 90 TABLET | Refills: 1 | Status: SHIPPED | OUTPATIENT
Start: 2023-04-25

## 2023-05-08 ENCOUNTER — OFFICE VISIT (OUTPATIENT)
Dept: PRIMARY CARE CLINIC | Age: 73
End: 2023-05-08
Payer: MEDICARE

## 2023-05-08 VITALS
WEIGHT: 228.2 LBS | HEART RATE: 78 BPM | DIASTOLIC BLOOD PRESSURE: 73 MMHG | OXYGEN SATURATION: 93 % | SYSTOLIC BLOOD PRESSURE: 120 MMHG | BODY MASS INDEX: 41.99 KG/M2 | HEIGHT: 62 IN

## 2023-05-08 DIAGNOSIS — N18.30 STAGE 3 CHRONIC KIDNEY DISEASE, UNSPECIFIED WHETHER STAGE 3A OR 3B CKD (HCC): ICD-10-CM

## 2023-05-08 DIAGNOSIS — Z00.00 INITIAL MEDICARE ANNUAL WELLNESS VISIT: ICD-10-CM

## 2023-05-08 DIAGNOSIS — R10.9 ABDOMINAL PAIN, VOMITING, AND DIARRHEA: ICD-10-CM

## 2023-05-08 DIAGNOSIS — Z79.4 TYPE 2 DIABETES MELLITUS WITH DIABETIC POLYNEUROPATHY, WITH LONG-TERM CURRENT USE OF INSULIN (HCC): Primary | ICD-10-CM

## 2023-05-08 DIAGNOSIS — Z71.89 ACP (ADVANCE CARE PLANNING): ICD-10-CM

## 2023-05-08 DIAGNOSIS — R11.10 ABDOMINAL PAIN, VOMITING, AND DIARRHEA: ICD-10-CM

## 2023-05-08 DIAGNOSIS — E66.01 MORBID OBESITY WITH BMI OF 40.0-44.9, ADULT (HCC): ICD-10-CM

## 2023-05-08 DIAGNOSIS — F11.20 OPIOID DEPENDENCE WITH CURRENT USE (HCC): ICD-10-CM

## 2023-05-08 DIAGNOSIS — Z12.31 ENCOUNTER FOR SCREENING MAMMOGRAM FOR BREAST CANCER: ICD-10-CM

## 2023-05-08 DIAGNOSIS — E11.42 TYPE 2 DIABETES MELLITUS WITH DIABETIC POLYNEUROPATHY, WITH LONG-TERM CURRENT USE OF INSULIN (HCC): Primary | ICD-10-CM

## 2023-05-08 DIAGNOSIS — I48.21 PERMANENT ATRIAL FIBRILLATION (HCC): ICD-10-CM

## 2023-05-08 DIAGNOSIS — R19.7 ABDOMINAL PAIN, VOMITING, AND DIARRHEA: ICD-10-CM

## 2023-05-08 DIAGNOSIS — I50.32 CHRONIC DIASTOLIC (CONGESTIVE) HEART FAILURE (HCC): ICD-10-CM

## 2023-05-08 DIAGNOSIS — Z78.0 POST-MENOPAUSAL: ICD-10-CM

## 2023-05-08 DIAGNOSIS — E87.1 HYPONATREMIA: ICD-10-CM

## 2023-05-08 LAB
BILIRUBIN, POC: NEGATIVE
BLOOD URINE, POC: NEGATIVE
CLARITY, POC: CLEAR
COLOR, POC: YELLOW
CREATININE URINE POCT: 200
GLUCOSE URINE, POC: POSITIVE
HBA1C MFR BLD: 14 %
KETONES, POC: NEGATIVE
LEUKOCYTE EST, POC: ABNORMAL
MICROALBUMIN/CREAT 24H UR: 80 MG/G{CREAT}
MICROALBUMIN/CREAT UR-RTO: <30
NITRITE, POC: NEGATIVE
PH, POC: 5
PROTEIN, POC: ABNORMAL
SPECIFIC GRAVITY, POC: 1.02
UROBILINOGEN, POC: NEGATIVE

## 2023-05-08 PROCEDURE — 3074F SYST BP LT 130 MM HG: CPT | Performed by: NURSE PRACTITIONER

## 2023-05-08 PROCEDURE — G8399 PT W/DXA RESULTS DOCUMENT: HCPCS | Performed by: NURSE PRACTITIONER

## 2023-05-08 PROCEDURE — 3017F COLORECTAL CA SCREEN DOC REV: CPT | Performed by: NURSE PRACTITIONER

## 2023-05-08 PROCEDURE — 1123F ACP DISCUSS/DSCN MKR DOCD: CPT | Performed by: NURSE PRACTITIONER

## 2023-05-08 PROCEDURE — G8417 CALC BMI ABV UP PARAM F/U: HCPCS | Performed by: NURSE PRACTITIONER

## 2023-05-08 PROCEDURE — 82044 UR ALBUMIN SEMIQUANTITATIVE: CPT | Performed by: NURSE PRACTITIONER

## 2023-05-08 PROCEDURE — 1090F PRES/ABSN URINE INCON ASSESS: CPT | Performed by: NURSE PRACTITIONER

## 2023-05-08 PROCEDURE — 3078F DIAST BP <80 MM HG: CPT | Performed by: NURSE PRACTITIONER

## 2023-05-08 PROCEDURE — G0438 PPPS, INITIAL VISIT: HCPCS | Performed by: NURSE PRACTITIONER

## 2023-05-08 PROCEDURE — 3046F HEMOGLOBIN A1C LEVEL >9.0%: CPT | Performed by: NURSE PRACTITIONER

## 2023-05-08 PROCEDURE — 99497 ADVNCD CARE PLAN 30 MIN: CPT | Performed by: NURSE PRACTITIONER

## 2023-05-08 PROCEDURE — 83036 HEMOGLOBIN GLYCOSYLATED A1C: CPT | Performed by: NURSE PRACTITIONER

## 2023-05-08 PROCEDURE — 81003 URINALYSIS AUTO W/O SCOPE: CPT | Performed by: NURSE PRACTITIONER

## 2023-05-08 PROCEDURE — 1036F TOBACCO NON-USER: CPT | Performed by: NURSE PRACTITIONER

## 2023-05-08 PROCEDURE — 2022F DILAT RTA XM EVC RTNOPTHY: CPT | Performed by: NURSE PRACTITIONER

## 2023-05-08 PROCEDURE — 99214 OFFICE O/P EST MOD 30 MIN: CPT | Performed by: NURSE PRACTITIONER

## 2023-05-08 PROCEDURE — G8427 DOCREV CUR MEDS BY ELIG CLIN: HCPCS | Performed by: NURSE PRACTITIONER

## 2023-05-08 RX ORDER — INSULIN ASPART 100 [IU]/ML
INJECTION, SOLUTION INTRAVENOUS; SUBCUTANEOUS
Qty: 10 ML | Refills: 3 | Status: SHIPPED
Start: 2023-05-08

## 2023-05-08 ASSESSMENT — PATIENT HEALTH QUESTIONNAIRE - PHQ9
SUM OF ALL RESPONSES TO PHQ QUESTIONS 1-9: 3
8. MOVING OR SPEAKING SO SLOWLY THAT OTHER PEOPLE COULD HAVE NOTICED. OR THE OPPOSITE, BEING SO FIGETY OR RESTLESS THAT YOU HAVE BEEN MOVING AROUND A LOT MORE THAN USUAL: 0
3. TROUBLE FALLING OR STAYING ASLEEP: 1
7. TROUBLE CONCENTRATING ON THINGS, SUCH AS READING THE NEWSPAPER OR WATCHING TELEVISION: 0
SUM OF ALL RESPONSES TO PHQ QUESTIONS 1-9: 3
4. FEELING TIRED OR HAVING LITTLE ENERGY: 1
6. FEELING BAD ABOUT YOURSELF - OR THAT YOU ARE A FAILURE OR HAVE LET YOURSELF OR YOUR FAMILY DOWN: 0
SUM OF ALL RESPONSES TO PHQ9 QUESTIONS 1 & 2: 1
SUM OF ALL RESPONSES TO PHQ QUESTIONS 1-9: 3
1. LITTLE INTEREST OR PLEASURE IN DOING THINGS: 0
2. FEELING DOWN, DEPRESSED OR HOPELESS: 1
5. POOR APPETITE OR OVEREATING: 0
SUM OF ALL RESPONSES TO PHQ QUESTIONS 1-9: 3
10. IF YOU CHECKED OFF ANY PROBLEMS, HOW DIFFICULT HAVE THESE PROBLEMS MADE IT FOR YOU TO DO YOUR WORK, TAKE CARE OF THINGS AT HOME, OR GET ALONG WITH OTHER PEOPLE: 1
9. THOUGHTS THAT YOU WOULD BE BETTER OFF DEAD, OR OF HURTING YOURSELF: 0

## 2023-05-08 ASSESSMENT — LIFESTYLE VARIABLES
HOW MANY STANDARD DRINKS CONTAINING ALCOHOL DO YOU HAVE ON A TYPICAL DAY: PATIENT DOES NOT DRINK
HOW OFTEN DO YOU HAVE A DRINK CONTAINING ALCOHOL: NEVER

## 2023-05-08 NOTE — PROGRESS NOTES
Poor    Interventions:  Patient advised to follow-up in this office for further evaluation and treatment    General HRA Questions:  Select all that apply: (!) New or Increased Pain    Pain Interventions: In Pain management  Advised to see FUN as she's having lower abd pains      Social and Emotional Support:  Do you get the social and emotional support that you need?: (!) No  Interventions:  Patient advised to follow up in the office for further evaluation and treatment    Weight and Activity:  Physical Activity: Inactive    Days of Exercise per Week: 0 days    Minutes of Exercise per Session: 0 min     On average, how many days per week do you engage in moderate to strenuous exercise (like a brisk walk)?: 0 days  Have you lost any weight without trying in the past 3 months?: (!) Yes  Body mass index is 41.74 kg/m². (!) Abnormal    Inactivity Interventions:  Patient established with pain management. Unintentional Weight Loss Interventions:  Weight consistent, stable from February office visit  Obesity Interventions:  See AVS for additional education material      Dentist Screen:  Have you seen the dentist within the past year?: (!) No    Intervention:  Advised to schedule with their dentist    Hearing Screen:  Do you or your family notice any trouble with your hearing that hasn't been managed with hearing aids?: (!) Yes    Interventions:  Patient comments: wears hearing aids    Vision Screen:  Do you have difficulty driving, watching TV, or doing any of your daily activities because of your eyesight?: No  Have you had an eye exam within the past year?: (!) No  No results found.     Interventions:   Patient encouraged to make appointment with their eye specialist     ADL's:   Patient reports needing help with:  Select all that apply: Matty Been, Housekeeping, Transportation, Shopping (Patient has a home health aide.)  Interventions:  Pt has an aid 5 days a week    Advanced Directives:  Do you have a Living Will?:

## 2023-05-09 ENCOUNTER — OFFICE VISIT (OUTPATIENT)
Dept: PODIATRY | Age: 73
End: 2023-05-09
Payer: MEDICARE

## 2023-05-09 VITALS — BODY MASS INDEX: 41.96 KG/M2 | WEIGHT: 228 LBS | HEIGHT: 62 IN

## 2023-05-09 DIAGNOSIS — M79.674 PAIN OF TOES OF BOTH FEET: ICD-10-CM

## 2023-05-09 DIAGNOSIS — E11.51 TYPE 2 DIABETES MELLITUS WITH PERIPHERAL VASCULAR DISEASE (HCC): ICD-10-CM

## 2023-05-09 DIAGNOSIS — M79.675 PAIN OF TOES OF BOTH FEET: ICD-10-CM

## 2023-05-09 DIAGNOSIS — B35.1 ONYCHOMYCOSIS OF TOENAIL: Primary | ICD-10-CM

## 2023-05-09 PROCEDURE — 11721 DEBRIDE NAIL 6 OR MORE: CPT | Performed by: PODIATRIST

## 2023-05-09 PROCEDURE — 99999 PR OFFICE/OUTPT VISIT,PROCEDURE ONLY: CPT | Performed by: PODIATRIST

## 2023-05-09 ASSESSMENT — ENCOUNTER SYMPTOMS
SHORTNESS OF BREATH: 0
DIARRHEA: 0
BACK PAIN: 0
COLOR CHANGE: 0
NAUSEA: 0

## 2023-05-09 NOTE — PROGRESS NOTES
SUBJECTIVE: Sally Silverio is a 67 y.o. female who returns to the office with chief complaint of painful fungal toenails. Patient relates toe nails are thickened/difficult to trim as well as painful with ambulation and with shoe gear. Chief Complaint   Patient presents with    Nail Problem     B/l nail trim, last seen Freeman Dudley VERONIKA CNP 5/8/23    Diabetes     Last blood sugar 200    Toe Pain     Right fourth toe, hit toe on bed a few weeks ago, has gotten better      Review of Systems   Constitutional:  Negative for activity change, appetite change, chills, diaphoresis, fatigue and fever. Respiratory:  Negative for shortness of breath. Cardiovascular:  Negative for leg swelling. Gastrointestinal:  Negative for diarrhea and nausea. Endocrine: Negative for cold intolerance, heat intolerance and polyuria. Musculoskeletal:  Positive for arthralgias. Negative for back pain, gait problem, joint swelling and myalgias. Skin:  Negative for color change, pallor, rash and wound. Allergic/Immunologic: Negative for environmental allergies and food allergies. Neurological:  Negative for dizziness, weakness, light-headedness and numbness. Hematological:  Does not bruise/bleed easily. Psychiatric/Behavioral:  Negative for behavioral problems, confusion and self-injury. The patient is not nervous/anxious. OBJECTIVE: Clinical evaluation of patient reveals nails 1,2,3,4,5 of the right foot and nails 1,2,3,4,5 of the left foot to present with thickness, elongation, discoloration, brittleness, and subungual debris. There was pain with palpation and debridement of the toenails of the bilateral feet. No open lesions noted to either foot today. The right DP pulse is not palpable. The left DP pulse is palpable. The right PT pulse is not palpable. The left PT pulse is not palpable. Protective sensation is present to the right plantar foot as noted with a 5.07 [place] monofilament.

## 2023-05-16 ENCOUNTER — HOSPITAL ENCOUNTER (OUTPATIENT)
Dept: PAIN MANAGEMENT | Age: 73
Discharge: HOME | End: 2023-05-16
Payer: MEDICARE

## 2023-05-16 VITALS
BODY MASS INDEX: 41.96 KG/M2 | RESPIRATION RATE: 20 BRPM | SYSTOLIC BLOOD PRESSURE: 136 MMHG | DIASTOLIC BLOOD PRESSURE: 78 MMHG | OXYGEN SATURATION: 91 % | HEART RATE: 87 BPM | WEIGHT: 228 LBS | TEMPERATURE: 97.3 F | HEIGHT: 62 IN

## 2023-05-16 DIAGNOSIS — M54.41 CHRONIC BILATERAL LOW BACK PAIN WITH BILATERAL SCIATICA: Primary | Chronic | ICD-10-CM

## 2023-05-16 DIAGNOSIS — M51.36 DDD (DEGENERATIVE DISC DISEASE), LUMBAR: ICD-10-CM

## 2023-05-16 DIAGNOSIS — M54.42 CHRONIC BILATERAL LOW BACK PAIN WITH BILATERAL SCIATICA: Primary | Chronic | ICD-10-CM

## 2023-05-16 DIAGNOSIS — M47.892 OTHER OSTEOARTHRITIS OF SPINE, CERVICAL REGION: Chronic | ICD-10-CM

## 2023-05-16 DIAGNOSIS — M47.817 LUMBOSACRAL SPONDYLOSIS WITHOUT MYELOPATHY: Chronic | ICD-10-CM

## 2023-05-16 DIAGNOSIS — M50.30 DDD (DEGENERATIVE DISC DISEASE), CERVICAL: Chronic | ICD-10-CM

## 2023-05-16 DIAGNOSIS — Z79.891 CHRONIC USE OF OPIATE FOR THERAPEUTIC PURPOSE: ICD-10-CM

## 2023-05-16 DIAGNOSIS — L98.9 PERINEAL IRRITATION IN FEMALE: ICD-10-CM

## 2023-05-16 DIAGNOSIS — M46.1 SACROILIITIS, NOT ELSEWHERE CLASSIFIED (HCC): Chronic | ICD-10-CM

## 2023-05-16 DIAGNOSIS — M54.16 LUMBAR RADICULOPATHY, CHRONIC: ICD-10-CM

## 2023-05-16 DIAGNOSIS — G89.29 CHRONIC BILATERAL LOW BACK PAIN WITH BILATERAL SCIATICA: Primary | Chronic | ICD-10-CM

## 2023-05-16 PROCEDURE — 99213 OFFICE O/P EST LOW 20 MIN: CPT | Performed by: NURSE PRACTITIONER

## 2023-05-16 PROCEDURE — 99213 OFFICE O/P EST LOW 20 MIN: CPT

## 2023-05-16 RX ORDER — OXYCODONE HYDROCHLORIDE AND ACETAMINOPHEN 5; 325 MG/1; MG/1
1 TABLET ORAL EVERY 8 HOURS PRN
Qty: 90 TABLET | Refills: 0 | Status: SHIPPED | OUTPATIENT
Start: 2023-05-19 | End: 2023-06-18

## 2023-05-16 RX ORDER — TOPIRAMATE 100 MG/1
100 TABLET, FILM COATED ORAL NIGHTLY
Qty: 90 TABLET | Refills: 2 | Status: SHIPPED | OUTPATIENT
Start: 2023-05-16

## 2023-05-16 ASSESSMENT — ENCOUNTER SYMPTOMS
CONSTIPATION: 0
BACK PAIN: 1
COUGH: 0
BOWEL INCONTINENCE: 0
SHORTNESS OF BREATH: 0

## 2023-05-16 NOTE — TELEPHONE ENCOUNTER
Osmany Dempsey is calling to request a refill on the following medication(s):    Medication Request:  Requested Prescriptions     Pending Prescriptions Disp Refills    nystatin [id number] UNIT/GM powder [Pharmacy Med Name: Yayacarmella Paco [id number] POWDER]  3     Sig: APPLY 3 TIMES DAILY.        Last Visit Date (If Applicable):  4/1/7595    Next Visit Date:    8/8/2023

## 2023-05-18 RX ORDER — NYSTATIN 100000 [USP'U]/G
POWDER TOPICAL
Qty: 30 G | Refills: 3 | Status: SHIPPED | OUTPATIENT
Start: 2023-05-18

## 2023-05-19 RX ORDER — BLOOD SUGAR DIAGNOSTIC
STRIP MISCELLANEOUS
Qty: 100 STRIP | Refills: 0 | Status: SHIPPED | OUTPATIENT
Start: 2023-05-19

## 2023-05-19 NOTE — TELEPHONE ENCOUNTER
Please Approve or Refuse.   Send to Pharmacy per Pt's Request: maría     Next Visit Date:  sent mychart    Last Visit Date: 5/10/2022    Hemoglobin A1C (%)   Date Value   05/08/2023 14.0   02/06/2023 14.0   10/13/2022 13.9             ( goal A1C is < 7)   BP Readings from Last 3 Encounters:   05/16/23 136/78   05/08/23 120/73   04/21/23 136/76          (goal 120/80)  BUN   Date Value Ref Range Status   04/21/2023 14 8 - 23 mg/dL Final     Creatinine   Date Value Ref Range Status   04/21/2023 0.91 (H) 0.50 - 0.90 mg/dL Final     Potassium   Date Value Ref Range Status   04/21/2023 4.3 3.7 - 5.3 mmol/L Final

## 2023-06-05 ENCOUNTER — OFFICE VISIT (OUTPATIENT)
Dept: OBGYN CLINIC | Age: 73
End: 2023-06-05
Payer: MEDICARE

## 2023-06-05 VITALS
HEART RATE: 83 BPM | WEIGHT: 228 LBS | SYSTOLIC BLOOD PRESSURE: 124 MMHG | BODY MASS INDEX: 41.96 KG/M2 | HEIGHT: 62 IN | DIASTOLIC BLOOD PRESSURE: 78 MMHG

## 2023-06-05 DIAGNOSIS — N76.3 CHRONIC VULVITIS: ICD-10-CM

## 2023-06-05 DIAGNOSIS — Z01.419 WELL WOMAN EXAM WITH ROUTINE GYNECOLOGICAL EXAM: Primary | ICD-10-CM

## 2023-06-05 PROCEDURE — 1090F PRES/ABSN URINE INCON ASSESS: CPT | Performed by: SPECIALIST

## 2023-06-05 PROCEDURE — 99387 INIT PM E/M NEW PAT 65+ YRS: CPT | Performed by: SPECIALIST

## 2023-06-05 PROCEDURE — G8427 DOCREV CUR MEDS BY ELIG CLIN: HCPCS | Performed by: SPECIALIST

## 2023-06-05 PROCEDURE — G8417 CALC BMI ABV UP PARAM F/U: HCPCS | Performed by: SPECIALIST

## 2023-06-05 PROCEDURE — 3074F SYST BP LT 130 MM HG: CPT | Performed by: SPECIALIST

## 2023-06-05 PROCEDURE — 1123F ACP DISCUSS/DSCN MKR DOCD: CPT | Performed by: SPECIALIST

## 2023-06-05 PROCEDURE — 3078F DIAST BP <80 MM HG: CPT | Performed by: SPECIALIST

## 2023-06-05 PROCEDURE — 3017F COLORECTAL CA SCREEN DOC REV: CPT | Performed by: SPECIALIST

## 2023-06-05 PROCEDURE — G8399 PT W/DXA RESULTS DOCUMENT: HCPCS | Performed by: SPECIALIST

## 2023-06-05 PROCEDURE — 1036F TOBACCO NON-USER: CPT | Performed by: SPECIALIST

## 2023-06-05 RX ORDER — METRONIDAZOLE 500 MG/1
500 TABLET ORAL 2 TIMES DAILY
Qty: 14 TABLET | Refills: 0 | Status: SHIPPED | OUTPATIENT
Start: 2023-06-05 | End: 2023-06-12

## 2023-06-05 RX ORDER — CLOTRIMAZOLE AND BETAMETHASONE DIPROPIONATE 10; .64 MG/G; MG/G
CREAM TOPICAL
Qty: 45 G | Refills: 1 | Status: SHIPPED | OUTPATIENT
Start: 2023-06-05

## 2023-06-05 RX ORDER — FLUCONAZOLE 100 MG/1
100 TABLET ORAL DAILY
Qty: 7 TABLET | Refills: 0 | Status: SHIPPED | OUTPATIENT
Start: 2023-06-05 | End: 2023-06-12

## 2023-06-05 ASSESSMENT — ENCOUNTER SYMPTOMS
APNEA: 0
COUGH: 0
CONSTIPATION: 0
VOMITING: 0
ABDOMINAL PAIN: 0
ABDOMINAL DISTENTION: 0
DIARRHEA: 0
NAUSEA: 0
EYE PAIN: 0

## 2023-06-05 NOTE — PROGRESS NOTES
Subjective:      Patient ID: Le Valencia is a 67 y.o. female. Chief Complaint   Patient presents with    Annual Exam     /78 (Site: Left Upper Arm, Position: Sitting, Cuff Size: Large Adult)   Pulse 83   Ht 5' 2\" (1.575 m)   Wt 228 lb (103.4 kg)   LMP  (LMP Unknown)   BMI 41.70 kg/m²   No LMP recorded (lmp unknown). Patient is postmenopausal.    [id number]    Past Medical History:   Diagnosis Date    Abdominal bloating 1/26/2021    Adenomatous polyp of ascending colon 1/26/2021    Allergic rhinitis     Anxiety 7/17/2013    Arthritis     Asthma     Atrial fibrillation ([facility])     Back pain     NERVE/DR. GRACIA    Benign hypertension with CKD (chronic kidney disease) stage III ([facility])     CAD (coronary artery disease) 3/21/2013    Caffeine use     2 coffee/day    CHF (congestive heart failure) ([facility])     Chronic kidney disease     CKD (chronic kidney disease) stage 3, GFR 30-59 ml/min ([facility])     COPD (chronic obstructive pulmonary disease) ([facility])     emphysema    Degeneration of lumbar or lumbosacral intervertebral disc     Depression     DM (diabetes mellitus) ([facility] [address]     Emphysema of lung ([facility])     Gastritis     GERD (gastroesophageal reflux disease)     Hearing loss     Hematuria     Hiatal hernia     HTN (hypertension), benign 6/28/2014    Hypertriglyceridemia     Incontinence of urine 9/25/2013    Irritable bowel syndrome with both constipation and diarrhea 1/26/2021    Knee arthropathy     Lumbosacral spondylosis without myelopathy 9/29/2014    Migraine headache     DR. GRACIA    Mumps     Neuropathy     Obesity     On home oxygen therapy     2 L PER NC  HS AND PRN    HIGINIO on CPAP     Otitis media 1-26-15    Secondary diabetes mellitus with stage 3 chronic kidney disease (GFR 30-59) ([facility])     Stroke ([facility])      mini stroke.     Tinnitus     Type 2 diabetes mellitus without complication ([facility])     Type II or unspecified type diabetes mellitus without mention of complication, not stated as uncontrolled     UTI

## 2023-06-19 DIAGNOSIS — I25.83 CORONARY ARTERY DISEASE DUE TO LIPID RICH PLAQUE: ICD-10-CM

## 2023-06-19 DIAGNOSIS — Z79.4 TYPE 2 DIABETES MELLITUS WITH DIABETIC POLYNEUROPATHY, WITH LONG-TERM CURRENT USE OF INSULIN (HCC): ICD-10-CM

## 2023-06-19 DIAGNOSIS — I25.10 CORONARY ARTERY DISEASE DUE TO LIPID RICH PLAQUE: ICD-10-CM

## 2023-06-19 DIAGNOSIS — N39.3 STRESS INCONTINENCE: ICD-10-CM

## 2023-06-19 DIAGNOSIS — E11.42 TYPE 2 DIABETES MELLITUS WITH DIABETIC POLYNEUROPATHY, WITH LONG-TERM CURRENT USE OF INSULIN (HCC): ICD-10-CM

## 2023-06-19 RX ORDER — GABAPENTIN 300 MG/1
CAPSULE ORAL
Qty: 90 CAPSULE | Refills: 2 | OUTPATIENT
Start: 2023-06-19

## 2023-06-19 NOTE — TELEPHONE ENCOUNTER
Please Approve or Refuse.   Send to Pharmacy per Pt's Request:      Next Visit Date:  Visit date not found   Last Visit Date: 5/10/2022    Hemoglobin A1C (%)   Date Value   05/08/2023 14.0   02/06/2023 14.0   10/13/2022 13.9             ( goal A1C is < 7)   BP Readings from Last 3 Encounters:   06/05/23 124/78   05/16/23 136/78   05/08/23 120/73          (goal 120/80)  BUN   Date Value Ref Range Status   04/21/2023 14 8 - 23 mg/dL Final     Creatinine   Date Value Ref Range Status   04/21/2023 0.91 (H) 0.50 - 0.90 mg/dL Final     Potassium   Date Value Ref Range Status   04/21/2023 4.3 3.7 - 5.3 mmol/L Final

## 2023-06-20 RX ORDER — OXYBUTYNIN CHLORIDE 5 MG/1
5 TABLET, EXTENDED RELEASE ORAL DAILY
Qty: 30 TABLET | Refills: 1 | Status: SHIPPED | OUTPATIENT
Start: 2023-06-20

## 2023-06-20 RX ORDER — CLOPIDOGREL BISULFATE 75 MG/1
TABLET ORAL
Qty: 90 TABLET | Refills: 1 | Status: SHIPPED | OUTPATIENT
Start: 2023-06-20

## 2023-06-27 ENCOUNTER — TELEPHONE (OUTPATIENT)
Dept: PRIMARY CARE CLINIC | Age: 73
End: 2023-06-27

## 2023-07-11 ENCOUNTER — APPOINTMENT (OUTPATIENT)
Dept: GENERAL RADIOLOGY | Age: 73
End: 2023-07-11
Payer: MEDICARE

## 2023-07-11 ENCOUNTER — HOSPITAL ENCOUNTER (OUTPATIENT)
Age: 73
Setting detail: OBSERVATION
Discharge: HOME | End: 2023-07-12
Attending: EMERGENCY MEDICINE | Admitting: EMERGENCY MEDICINE
Payer: MEDICARE

## 2023-07-11 DIAGNOSIS — I20.9 ANGINA PECTORIS (HCC): Primary | ICD-10-CM

## 2023-07-11 LAB
ANION GAP SERPL CALCULATED.3IONS-SCNC: 15 MMOL/L (ref 9–17)
BASOPHILS # BLD: 0.06 K/UL (ref 0–0.2)
BASOPHILS NFR BLD: 1 % (ref 0–2)
BNP SERPL-MCNC: 178 PG/ML
BUN SERPL-MCNC: 17 MG/DL (ref 8–23)
CALCIUM SERPL-MCNC: 9.1 MG/DL (ref 8.6–10.4)
CHLORIDE SERPL-SCNC: 92 MMOL/L (ref 98–107)
CO2 SERPL-SCNC: 23 MMOL/L (ref 20–31)
CREAT SERPL-MCNC: 0.9 MG/DL (ref 0.5–0.9)
EOSINOPHIL # BLD: 0.14 K/UL (ref 0–0.44)
EOSINOPHILS RELATIVE PERCENT: 2 % (ref 1–4)
ERYTHROCYTE [DISTWIDTH] IN BLOOD BY AUTOMATED COUNT: 12.5 % (ref 11.8–14.4)
GFR SERPL CREATININE-BSD FRML MDRD: >60 ML/MIN/1.73M2
GLUCOSE BLD-MCNC: 357 MG/DL (ref 65–105)
GLUCOSE BLD-MCNC: 408 MG/DL (ref 65–105)
GLUCOSE SERPL-MCNC: 378 MG/DL (ref 70–99)
HCT VFR BLD AUTO: 42.2 % (ref 36.3–47.1)
HGB BLD-MCNC: 14.2 G/DL (ref 11.9–15.1)
IMM GRANULOCYTES # BLD AUTO: 0.04 K/UL (ref 0–0.3)
IMM GRANULOCYTES NFR BLD: 1 %
LYMPHOCYTES # BLD: 28 % (ref 24–43)
LYMPHOCYTES NFR BLD: 2.2 K/UL (ref 1.1–3.7)
MAGNESIUM SERPL-MCNC: 1.6 MG/DL (ref 1.6–2.6)
MCH RBC QN AUTO: 30.4 PG (ref 25.2–33.5)
MCHC RBC AUTO-ENTMCNC: 33.6 G/DL (ref 28.4–34.8)
MCV RBC AUTO: 90.4 FL (ref 82.6–102.9)
MONOCYTES NFR BLD: 0.63 K/UL (ref 0.1–1.2)
MONOCYTES NFR BLD: 8 % (ref 3–12)
NEUTROPHILS NFR BLD: 61 % (ref 36–65)
NEUTS SEG NFR BLD: 4.77 K/UL (ref 1.5–8.1)
NRBC BLD-RTO: 0 PER 100 WBC
PLATELET # BLD AUTO: ABNORMAL K/UL (ref 138–453)
PLATELET, FLUORESCENCE: 181 K/UL (ref 138–453)
PLATELETS.RETICULATED NFR BLD AUTO: 10.6 % (ref 1.1–10.3)
POTASSIUM SERPL-SCNC: 4.3 MMOL/L (ref 3.7–5.3)
RBC # BLD AUTO: 4.67 M/UL (ref 3.95–5.11)
SARS-COV-2 RDRP RESP QL NAA+PROBE: NOT DETECTED
SODIUM SERPL-SCNC: 130 MMOL/L (ref 135–144)
SPECIMEN DESCRIPTION: NORMAL
TROPONIN I SERPL HS-MCNC: 10 NG/L (ref 0–14)
TROPONIN I SERPL HS-MCNC: 11 NG/L (ref 0–14)
WBC OTHER # BLD: 7.8 K/UL (ref 3.5–11.3)

## 2023-07-11 PROCEDURE — 6370000000 HC RX 637 (ALT 250 FOR IP)

## 2023-07-11 PROCEDURE — 99285 EMERGENCY DEPT VISIT HI MDM: CPT

## 2023-07-11 PROCEDURE — 87635 SARS-COV-2 COVID-19 AMP PRB: CPT

## 2023-07-11 PROCEDURE — 96372 THER/PROPH/DIAG INJ SC/IM: CPT

## 2023-07-11 PROCEDURE — 94640 AIRWAY INHALATION TREATMENT: CPT

## 2023-07-11 PROCEDURE — 71045 X-RAY EXAM CHEST 1 VIEW: CPT

## 2023-07-11 PROCEDURE — G0378 HOSPITAL OBSERVATION PER HR: HCPCS

## 2023-07-11 PROCEDURE — 84484 ASSAY OF TROPONIN QUANT: CPT

## 2023-07-11 PROCEDURE — 96374 THER/PROPH/DIAG INJ IV PUSH: CPT

## 2023-07-11 PROCEDURE — 80048 BASIC METABOLIC PNL TOTAL CA: CPT

## 2023-07-11 PROCEDURE — 93005 ELECTROCARDIOGRAM TRACING: CPT | Performed by: STUDENT IN AN ORGANIZED HEALTH CARE EDUCATION/TRAINING PROGRAM

## 2023-07-11 PROCEDURE — 83880 ASSAY OF NATRIURETIC PEPTIDE: CPT

## 2023-07-11 PROCEDURE — 85055 RETICULATED PLATELET ASSAY: CPT

## 2023-07-11 PROCEDURE — 2580000003 HC RX 258: Performed by: STUDENT IN AN ORGANIZED HEALTH CARE EDUCATION/TRAINING PROGRAM

## 2023-07-11 PROCEDURE — 83735 ASSAY OF MAGNESIUM: CPT

## 2023-07-11 PROCEDURE — 94660 CPAP INITIATION&MGMT: CPT

## 2023-07-11 PROCEDURE — 6360000002 HC RX W HCPCS: Performed by: STUDENT IN AN ORGANIZED HEALTH CARE EDUCATION/TRAINING PROGRAM

## 2023-07-11 PROCEDURE — 6370000000 HC RX 637 (ALT 250 FOR IP): Performed by: STUDENT IN AN ORGANIZED HEALTH CARE EDUCATION/TRAINING PROGRAM

## 2023-07-11 PROCEDURE — 85027 COMPLETE CBC AUTOMATED: CPT

## 2023-07-11 PROCEDURE — 82947 ASSAY GLUCOSE BLOOD QUANT: CPT

## 2023-07-11 RX ORDER — FUROSEMIDE 20 MG/1
20 TABLET ORAL DAILY
Status: DISCONTINUED | OUTPATIENT
Start: 2023-07-11 | End: 2023-07-12 | Stop reason: HOSPADM

## 2023-07-11 RX ORDER — FENTANYL CITRATE 50 UG/ML
25 INJECTION, SOLUTION INTRAMUSCULAR; INTRAVENOUS ONCE
Status: DISCONTINUED | OUTPATIENT
Start: 2023-07-11 | End: 2023-07-11

## 2023-07-11 RX ORDER — ALBUTEROL SULFATE 2.5 MG/3ML
2.5 SOLUTION RESPIRATORY (INHALATION) EVERY 6 HOURS PRN
Status: DISCONTINUED | OUTPATIENT
Start: 2023-07-11 | End: 2023-07-12 | Stop reason: HOSPADM

## 2023-07-11 RX ORDER — SODIUM CHLORIDE 0.9 % (FLUSH) 0.9 %
5-40 SYRINGE (ML) INJECTION PRN
Status: DISCONTINUED | OUTPATIENT
Start: 2023-07-11 | End: 2023-07-12 | Stop reason: HOSPADM

## 2023-07-11 RX ORDER — ATORVASTATIN CALCIUM 40 MG/1
40 TABLET, FILM COATED ORAL DAILY
Status: DISCONTINUED | OUTPATIENT
Start: 2023-07-11 | End: 2023-07-12 | Stop reason: HOSPADM

## 2023-07-11 RX ORDER — ENOXAPARIN SODIUM 100 MG/ML
30 INJECTION SUBCUTANEOUS 2 TIMES DAILY
Status: DISCONTINUED | OUTPATIENT
Start: 2023-07-11 | End: 2023-07-12 | Stop reason: HOSPADM

## 2023-07-11 RX ORDER — SODIUM CHLORIDE 0.9 % (FLUSH) 0.9 %
5-40 SYRINGE (ML) INJECTION EVERY 12 HOURS SCHEDULED
Status: DISCONTINUED | OUTPATIENT
Start: 2023-07-11 | End: 2023-07-12 | Stop reason: HOSPADM

## 2023-07-11 RX ORDER — CLOPIDOGREL BISULFATE 75 MG/1
75 TABLET ORAL DAILY
Status: DISCONTINUED | OUTPATIENT
Start: 2023-07-11 | End: 2023-07-12 | Stop reason: HOSPADM

## 2023-07-11 RX ORDER — BUDESONIDE AND FORMOTEROL FUMARATE DIHYDRATE 160; 4.5 UG/1; UG/1
2 AEROSOL RESPIRATORY (INHALATION) 2 TIMES DAILY
Status: DISCONTINUED | OUTPATIENT
Start: 2023-07-11 | End: 2023-07-12 | Stop reason: HOSPADM

## 2023-07-11 RX ORDER — INSULIN LISPRO 100 [IU]/ML
0-4 INJECTION, SOLUTION INTRAVENOUS; SUBCUTANEOUS NIGHTLY
Status: DISCONTINUED | OUTPATIENT
Start: 2023-07-11 | End: 2023-07-12 | Stop reason: HOSPADM

## 2023-07-11 RX ORDER — ACETAMINOPHEN 650 MG/1
650 SUPPOSITORY RECTAL EVERY 6 HOURS PRN
Status: DISCONTINUED | OUTPATIENT
Start: 2023-07-11 | End: 2023-07-12 | Stop reason: HOSPADM

## 2023-07-11 RX ORDER — SODIUM CHLORIDE 9 MG/ML
INJECTION, SOLUTION INTRAVENOUS PRN
Status: DISCONTINUED | OUTPATIENT
Start: 2023-07-11 | End: 2023-07-12 | Stop reason: HOSPADM

## 2023-07-11 RX ORDER — POTASSIUM CHLORIDE 20 MEQ/1
40 TABLET, EXTENDED RELEASE ORAL PRN
Status: DISCONTINUED | OUTPATIENT
Start: 2023-07-11 | End: 2023-07-12 | Stop reason: HOSPADM

## 2023-07-11 RX ORDER — INSULIN LISPRO 100 [IU]/ML
0-16 INJECTION, SOLUTION INTRAVENOUS; SUBCUTANEOUS
Status: DISCONTINUED | OUTPATIENT
Start: 2023-07-12 | End: 2023-07-12 | Stop reason: HOSPADM

## 2023-07-11 RX ORDER — KETOROLAC TROMETHAMINE 15 MG/ML
15 INJECTION, SOLUTION INTRAMUSCULAR; INTRAVENOUS ONCE
Status: COMPLETED | OUTPATIENT
Start: 2023-07-11 | End: 2023-07-11

## 2023-07-11 RX ORDER — TOPIRAMATE 100 MG/1
100 TABLET, FILM COATED ORAL NIGHTLY
Status: DISCONTINUED | OUTPATIENT
Start: 2023-07-11 | End: 2023-07-12 | Stop reason: HOSPADM

## 2023-07-11 RX ORDER — ONDANSETRON 2 MG/ML
4 INJECTION INTRAMUSCULAR; INTRAVENOUS EVERY 4 HOURS PRN
Status: DISCONTINUED | OUTPATIENT
Start: 2023-07-11 | End: 2023-07-12 | Stop reason: HOSPADM

## 2023-07-11 RX ORDER — POLYETHYLENE GLYCOL 3350 17 G/17G
17 POWDER, FOR SOLUTION ORAL DAILY PRN
Status: DISCONTINUED | OUTPATIENT
Start: 2023-07-11 | End: 2023-07-12 | Stop reason: HOSPADM

## 2023-07-11 RX ORDER — CARVEDILOL 3.12 MG/1
3.12 TABLET ORAL 2 TIMES DAILY WITH MEALS
Status: DISCONTINUED | OUTPATIENT
Start: 2023-07-11 | End: 2023-07-12 | Stop reason: HOSPADM

## 2023-07-11 RX ORDER — CHOLECALCIFEROL (VITAMIN D3) 125 MCG
10 CAPSULE ORAL NIGHTLY
Status: DISCONTINUED | OUTPATIENT
Start: 2023-07-11 | End: 2023-07-12 | Stop reason: HOSPADM

## 2023-07-11 RX ORDER — ESCITALOPRAM OXALATE 10 MG/1
20 TABLET ORAL EVERY MORNING
Status: DISCONTINUED | OUTPATIENT
Start: 2023-07-12 | End: 2023-07-12 | Stop reason: HOSPADM

## 2023-07-11 RX ORDER — NITROGLYCERIN 0.4 MG/1
0.4 TABLET SUBLINGUAL ONCE
Status: COMPLETED | OUTPATIENT
Start: 2023-07-11 | End: 2023-07-11

## 2023-07-11 RX ORDER — ISOSORBIDE DINITRATE 10 MG/1
30 TABLET ORAL DAILY
Status: DISCONTINUED | OUTPATIENT
Start: 2023-07-11 | End: 2023-07-12 | Stop reason: HOSPADM

## 2023-07-11 RX ORDER — BUPROPION HYDROCHLORIDE 150 MG/1
150 TABLET ORAL EVERY MORNING
Status: DISCONTINUED | OUTPATIENT
Start: 2023-07-12 | End: 2023-07-12 | Stop reason: HOSPADM

## 2023-07-11 RX ORDER — ACETAMINOPHEN 325 MG/1
650 TABLET ORAL EVERY 6 HOURS PRN
Status: DISCONTINUED | OUTPATIENT
Start: 2023-07-11 | End: 2023-07-12 | Stop reason: HOSPADM

## 2023-07-11 RX ORDER — DEXTROSE MONOHYDRATE 100 MG/ML
INJECTION, SOLUTION INTRAVENOUS CONTINUOUS PRN
Status: DISCONTINUED | OUTPATIENT
Start: 2023-07-11 | End: 2023-07-12 | Stop reason: HOSPADM

## 2023-07-11 RX ORDER — ACETAMINOPHEN 500 MG
1000 TABLET ORAL ONCE
Status: COMPLETED | OUTPATIENT
Start: 2023-07-11 | End: 2023-07-11

## 2023-07-11 RX ORDER — POTASSIUM CHLORIDE 7.45 MG/ML
10 INJECTION INTRAVENOUS PRN
Status: DISCONTINUED | OUTPATIENT
Start: 2023-07-11 | End: 2023-07-12 | Stop reason: HOSPADM

## 2023-07-11 RX ADMIN — DESMOPRESSIN ACETATE 40 MG: 0.2 TABLET ORAL at 21:00

## 2023-07-11 RX ADMIN — Medication 10 MG: at 21:00

## 2023-07-11 RX ADMIN — ENOXAPARIN SODIUM 30 MG: 30 INJECTION SUBCUTANEOUS at 21:00

## 2023-07-11 RX ADMIN — SODIUM CHLORIDE, PRESERVATIVE FREE 10 ML: 5 INJECTION INTRAVENOUS at 20:50

## 2023-07-11 RX ADMIN — CARVEDILOL 3.12 MG: 3.12 TABLET, FILM COATED ORAL at 20:59

## 2023-07-11 RX ADMIN — INSULIN LISPRO 4 UNITS: 100 INJECTION, SOLUTION INTRAVENOUS; SUBCUTANEOUS at 21:34

## 2023-07-11 RX ADMIN — ISOSORBIDE DINITRATE 30 MG: 10 TABLET ORAL at 21:00

## 2023-07-11 RX ADMIN — BUDESONIDE AND FORMOTEROL FUMARATE DIHYDRATE 2 PUFF: 160; 4.5 AEROSOL RESPIRATORY (INHALATION) at 21:34

## 2023-07-11 RX ADMIN — ACETAMINOPHEN 1000 MG: 500 TABLET ORAL at 19:32

## 2023-07-11 RX ADMIN — TOPIRAMATE 100 MG: 100 TABLET, FILM COATED ORAL at 21:00

## 2023-07-11 RX ADMIN — NITROGLYCERIN 0.4 MG: 0.4 TABLET SUBLINGUAL at 16:19

## 2023-07-11 RX ADMIN — KETOROLAC TROMETHAMINE 15 MG: 15 INJECTION, SOLUTION INTRAMUSCULAR; INTRAVENOUS at 18:23

## 2023-07-11 RX ADMIN — CLOPIDOGREL BISULFATE 75 MG: 75 TABLET, FILM COATED ORAL at 21:00

## 2023-07-11 ASSESSMENT — ENCOUNTER SYMPTOMS
NAUSEA: 1
VOMITING: 0
SHORTNESS OF BREATH: 1
DIARRHEA: 0
COLOR CHANGE: 0

## 2023-07-11 ASSESSMENT — PAIN - FUNCTIONAL ASSESSMENT: PAIN_FUNCTIONAL_ASSESSMENT: 0-10

## 2023-07-11 ASSESSMENT — PAIN DESCRIPTION - LOCATION
LOCATION: CHEST

## 2023-07-11 ASSESSMENT — HEART SCORE: ECG: 1

## 2023-07-11 ASSESSMENT — PAIN DESCRIPTION - FREQUENCY: FREQUENCY: CONTINUOUS

## 2023-07-11 ASSESSMENT — PAIN DESCRIPTION - PAIN TYPE: TYPE: ACUTE PAIN

## 2023-07-11 ASSESSMENT — PAIN SCALES - GENERAL
PAINLEVEL_OUTOF10: 6
PAINLEVEL_OUTOF10: 8
PAINLEVEL_OUTOF10: 8
PAINLEVEL_OUTOF10: 7

## 2023-07-11 ASSESSMENT — PAIN DESCRIPTION - ORIENTATION: ORIENTATION: MID

## 2023-07-11 NOTE — ED PROVIDER NOTES
[address] O[address]ERVATION  Emergency Department Encounter  Emergency Medicine Resident     Pt Name:Mariangel Lincoln  MRN: [id number]  [address] [facility] 1950  Date of evaluation: 7/11/23  PCP:  VERONIKA Romero CNP  Note Started: 4:18 PM EDT      CHIEF COMPLAINT       Chief Complaint   Patient presents with    Chest Pain       HISTORY OF PRESENT ILLNESS  (Location/Symptom, Timing/Onset, Context/Setting, Quality, Duration, Modifying Factors, Severity.)      Richard Dance is a 67 y.o. female who presents with chest pain shortness of breath that started today while she was gossiping with her friends. Past medical history significant for type 2 diabetes, GERD, hypertension, hyperlipidemia, CAD, CKD, A-fib but not on anticoagulation as well as CHF. Patient states that she was sitting in the air conditioning when she was talking with her friends developed a sharp chest pain that became a pressure-like sensation went down her left arm. States she has associated nausea and shortness of breath. Denies any leg swelling fever chills hemoptysis, recent surgeries immobilization or active cancer diagnoses. Denies any hormonal use.      PAST MEDICAL / SURGICAL / SOCIAL / FAMILY HISTORY      has a past medical history of Abdominal bloating, Adenomatous polyp of ascending colon, Allergic rhinitis, Anxiety, Arthritis, Asthma, Atrial fibrillation (HCC), Back pain, Benign hypertension with CKD (chronic kidney disease) stage III ([address]), CAD (coronary artery disease), Caffeine use, CHF (congestive heart failure) ([address]), Chronic kidney disease, CKD (chronic kidney disease) stage 3, GFR 30-59 ml/min ([facility]), COPD (chronic obstructive pulmonary disease) ([address]), Degeneration of lumbar or lumbosacral intervertebral disc, Depression, DM (diabetes mellitus) ([address]), Emphysema of lung ([address]), Gastritis, GERD (gastroesophageal reflux disease), Hearing loss, Hematuria, Hiatal hernia, HTN (hypertension), benign, Hypertriglyceridemia,

## 2023-07-11 NOTE — ED NOTES
Patient resting in bed  RR even and non-labored, chest rise and fall noted  Bed locked and in lowest position  Call light within reach  Side rails up x2  Will continue to monitor      Lokesh Christianson RN  07/11/23 1842

## 2023-07-11 NOTE — ED NOTES
Patient presents to the ED via EMS for chest pain and dizziness. She reports that her symptoms started 3 days ago. She reports a history TIA and MI in the past. She was given 325 mg of Aspirin via squad and also Nitroglycerin with no relief. She reports nausea and dizziness as well, but denies shortness of breath. Patient is alert and oriented X4. Patient is connected to telemetry. EKG completed. Will continue to monitor.       Jaylon Gray RN  07/11/23 1977

## 2023-07-12 ENCOUNTER — APPOINTMENT (OUTPATIENT)
Dept: NUCLEAR MEDICINE | Age: 73
End: 2023-07-12
Payer: MEDICARE

## 2023-07-12 VITALS
RESPIRATION RATE: 16 BRPM | SYSTOLIC BLOOD PRESSURE: 119 MMHG | WEIGHT: 228 LBS | DIASTOLIC BLOOD PRESSURE: 65 MMHG | HEIGHT: 62 IN | TEMPERATURE: 97.3 F | HEART RATE: 75 BPM | BODY MASS INDEX: 41.96 KG/M2 | OXYGEN SATURATION: 98 %

## 2023-07-12 LAB
ANION GAP SERPL CALCULATED.3IONS-SCNC: 13 MMOL/L (ref 9–17)
BASOPHILS # BLD: 0.06 K/UL (ref 0–0.2)
BASOPHILS NFR BLD: 1 % (ref 0–2)
BUN SERPL-MCNC: 20 MG/DL (ref 8–23)
CALCIUM SERPL-MCNC: 9.1 MG/DL (ref 8.6–10.4)
CHLORIDE SERPL-SCNC: 95 MMOL/L (ref 98–107)
CO2 SERPL-SCNC: 25 MMOL/L (ref 20–31)
CREAT SERPL-MCNC: 1.1 MG/DL (ref 0.5–0.9)
EOSINOPHIL # BLD: 0.14 K/UL (ref 0–0.44)
EOSINOPHILS RELATIVE PERCENT: 3 % (ref 1–4)
ERYTHROCYTE [DISTWIDTH] IN BLOOD BY AUTOMATED COUNT: 12.4 % (ref 11.8–14.4)
GFR SERPL CREATININE-BSD FRML MDRD: 53 ML/MIN/1.73M2
GLUCOSE BLD-MCNC: 384 MG/DL (ref 65–105)
GLUCOSE BLD-MCNC: 498 MG/DL (ref 65–105)
GLUCOSE SERPL-MCNC: 438 MG/DL (ref 70–99)
HCT VFR BLD AUTO: 40 % (ref 36.3–47.1)
HGB BLD-MCNC: 13.1 G/DL (ref 11.9–15.1)
IMM GRANULOCYTES # BLD AUTO: <0.03 K/UL (ref 0–0.3)
IMM GRANULOCYTES NFR BLD: 0 %
LV EF: 70 %
LVEF MODALITY: NORMAL
LYMPHOCYTES # BLD: 32 % (ref 24–43)
LYMPHOCYTES NFR BLD: 1.7 K/UL (ref 1.1–3.7)
MCH RBC QN AUTO: 30.6 PG (ref 25.2–33.5)
MCHC RBC AUTO-ENTMCNC: 32.8 G/DL (ref 28.4–34.8)
MCV RBC AUTO: 93.5 FL (ref 82.6–102.9)
MONOCYTES NFR BLD: 0.58 K/UL (ref 0.1–1.2)
MONOCYTES NFR BLD: 11 % (ref 3–12)
NEUTROPHILS NFR BLD: 53 % (ref 36–65)
NEUTS SEG NFR BLD: 2.82 K/UL (ref 1.5–8.1)
NRBC BLD-RTO: 0 PER 100 WBC
PLATELET # BLD AUTO: 157 K/UL (ref 138–453)
PMV BLD AUTO: 11.6 FL (ref 8.1–13.5)
POTASSIUM SERPL-SCNC: 4.3 MMOL/L (ref 3.7–5.3)
RBC # BLD AUTO: 4.28 M/UL (ref 3.95–5.11)
SODIUM SERPL-SCNC: 133 MMOL/L (ref 135–144)
TROPONIN I SERPL HS-MCNC: 11 NG/L (ref 0–14)
WBC OTHER # BLD: 5.3 K/UL (ref 3.5–11.3)

## 2023-07-12 PROCEDURE — G0378 HOSPITAL OBSERVATION PER HR: HCPCS

## 2023-07-12 PROCEDURE — 6370000000 HC RX 637 (ALT 250 FOR IP)

## 2023-07-12 PROCEDURE — 93016 CV STRESS TEST SUPVJ ONLY: CPT | Performed by: INTERNAL MEDICINE

## 2023-07-12 PROCEDURE — A9500 TC99M SESTAMIBI: HCPCS | Performed by: EMERGENCY MEDICINE

## 2023-07-12 PROCEDURE — 84484 ASSAY OF TROPONIN QUANT: CPT

## 2023-07-12 PROCEDURE — 93017 CV STRESS TEST TRACING ONLY: CPT

## 2023-07-12 PROCEDURE — 6370000000 HC RX 637 (ALT 250 FOR IP): Performed by: STUDENT IN AN ORGANIZED HEALTH CARE EDUCATION/TRAINING PROGRAM

## 2023-07-12 PROCEDURE — 36415 COLL VENOUS BLD VENIPUNCTURE: CPT

## 2023-07-12 PROCEDURE — 80048 BASIC METABOLIC PNL TOTAL CA: CPT

## 2023-07-12 PROCEDURE — 6360000002 HC RX W HCPCS: Performed by: EMERGENCY MEDICINE

## 2023-07-12 PROCEDURE — 82947 ASSAY GLUCOSE BLOOD QUANT: CPT

## 2023-07-12 PROCEDURE — 99223 1ST HOSP IP/OBS HIGH 75: CPT | Performed by: INTERNAL MEDICINE

## 2023-07-12 PROCEDURE — 85027 COMPLETE CBC AUTOMATED: CPT

## 2023-07-12 PROCEDURE — 2580000003 HC RX 258: Performed by: EMERGENCY MEDICINE

## 2023-07-12 PROCEDURE — 78452 HT MUSCLE IMAGE SPECT MULT: CPT

## 2023-07-12 PROCEDURE — 93005 ELECTROCARDIOGRAM TRACING: CPT | Performed by: STUDENT IN AN ORGANIZED HEALTH CARE EDUCATION/TRAINING PROGRAM

## 2023-07-12 PROCEDURE — 3430000000 HC RX DIAGNOSTIC RADIOPHARMACEUTICAL: Performed by: EMERGENCY MEDICINE

## 2023-07-12 RX ORDER — SODIUM CHLORIDE 9 MG/ML
500 INJECTION, SOLUTION INTRAVENOUS CONTINUOUS PRN
Status: DISCONTINUED | OUTPATIENT
Start: 2023-07-12 | End: 2023-07-12 | Stop reason: ALTCHOICE

## 2023-07-12 RX ORDER — TETRAKIS(2-METHOXYISOBUTYLISOCYANIDE)COPPER(I) TETRAFLUOROBORATE 1 MG/ML
15.5 INJECTION, POWDER, LYOPHILIZED, FOR SOLUTION INTRAVENOUS
Status: COMPLETED | OUTPATIENT
Start: 2023-07-12 | End: 2023-07-12

## 2023-07-12 RX ORDER — SODIUM CHLORIDE 0.9 % (FLUSH) 0.9 %
10 SYRINGE (ML) INJECTION PRN
Status: DISCONTINUED | OUTPATIENT
Start: 2023-07-12 | End: 2023-07-12 | Stop reason: HOSPADM

## 2023-07-12 RX ORDER — ATROPINE SULFATE 0.1 MG/ML
0.5 INJECTION INTRAVENOUS EVERY 5 MIN PRN
Status: DISCONTINUED | OUTPATIENT
Start: 2023-07-12 | End: 2023-07-12 | Stop reason: ALTCHOICE

## 2023-07-12 RX ORDER — SODIUM CHLORIDE 0.9 % (FLUSH) 0.9 %
5-40 SYRINGE (ML) INJECTION PRN
Status: ACTIVE | OUTPATIENT
Start: 2023-07-12 | End: 2023-07-12

## 2023-07-12 RX ORDER — TETRAKIS(2-METHOXYISOBUTYLISOCYANIDE)COPPER(I) TETRAFLUOROBORATE 1 MG/ML
53.7 INJECTION, POWDER, LYOPHILIZED, FOR SOLUTION INTRAVENOUS
Status: COMPLETED | OUTPATIENT
Start: 2023-07-12 | End: 2023-07-12

## 2023-07-12 RX ORDER — AMINOPHYLLINE DIHYDRATE 25 MG/ML
50 INJECTION, SOLUTION INTRAVENOUS PRN
Status: DISCONTINUED | OUTPATIENT
Start: 2023-07-12 | End: 2023-07-12 | Stop reason: ALTCHOICE

## 2023-07-12 RX ORDER — NITROGLYCERIN 0.4 MG/1
0.4 TABLET SUBLINGUAL EVERY 5 MIN PRN
Status: DISCONTINUED | OUTPATIENT
Start: 2023-07-12 | End: 2023-07-12 | Stop reason: ALTCHOICE

## 2023-07-12 RX ORDER — ALBUTEROL SULFATE 90 UG/1
2 AEROSOL, METERED RESPIRATORY (INHALATION) PRN
Status: DISCONTINUED | OUTPATIENT
Start: 2023-07-12 | End: 2023-07-12 | Stop reason: ALTCHOICE

## 2023-07-12 RX ORDER — INSULIN LISPRO 100 [IU]/ML
10 INJECTION, SOLUTION INTRAVENOUS; SUBCUTANEOUS ONCE
Status: COMPLETED | OUTPATIENT
Start: 2023-07-12 | End: 2023-07-12

## 2023-07-12 RX ORDER — REGADENOSON 0.08 MG/ML
0.4 INJECTION, SOLUTION INTRAVENOUS
Status: COMPLETED | OUTPATIENT
Start: 2023-07-12 | End: 2023-07-12

## 2023-07-12 RX ORDER — METOPROLOL TARTRATE 5 MG/5ML
5 INJECTION INTRAVENOUS EVERY 5 MIN PRN
Status: DISCONTINUED | OUTPATIENT
Start: 2023-07-12 | End: 2023-07-12 | Stop reason: ALTCHOICE

## 2023-07-12 RX ADMIN — ESCITALOPRAM OXALATE 20 MG: 10 TABLET ORAL at 14:09

## 2023-07-12 RX ADMIN — CLOPIDOGREL BISULFATE 75 MG: 75 TABLET, FILM COATED ORAL at 14:10

## 2023-07-12 RX ADMIN — ISOSORBIDE DINITRATE 30 MG: 10 TABLET ORAL at 14:09

## 2023-07-12 RX ADMIN — SODIUM CHLORIDE, PRESERVATIVE FREE 10 ML: 5 INJECTION INTRAVENOUS at 11:06

## 2023-07-12 RX ADMIN — SODIUM CHLORIDE, PRESERVATIVE FREE 10 ML: 5 INJECTION INTRAVENOUS at 12:20

## 2023-07-12 RX ADMIN — REGADENOSON 0.4 MG: 0.08 INJECTION, SOLUTION INTRAVENOUS at 11:06

## 2023-07-12 RX ADMIN — INSULIN LISPRO 16 UNITS: 100 INJECTION, SOLUTION INTRAVENOUS; SUBCUTANEOUS at 16:52

## 2023-07-12 RX ADMIN — TETRAKIS(2-METHOXYISOBUTYLISOCYANIDE)COPPER(I) TETRAFLUOROBORATE 15.5 MILLICURIE: 1 INJECTION, POWDER, LYOPHILIZED, FOR SOLUTION INTRAVENOUS at 11:06

## 2023-07-12 RX ADMIN — ACETAMINOPHEN 650 MG: 325 TABLET ORAL at 15:22

## 2023-07-12 RX ADMIN — INSULIN LISPRO 10 UNITS: 100 INJECTION, SOLUTION INTRAVENOUS; SUBCUTANEOUS at 07:32

## 2023-07-12 RX ADMIN — TETRAKIS(2-METHOXYISOBUTYLISOCYANIDE)COPPER(I) TETRAFLUOROBORATE 53.7 MILLICURIE: 1 INJECTION, POWDER, LYOPHILIZED, FOR SOLUTION INTRAVENOUS at 12:20

## 2023-07-12 RX ADMIN — BUPROPION HYDROCHLORIDE 150 MG: 150 TABLET, EXTENDED RELEASE ORAL at 14:10

## 2023-07-12 NOTE — CONSULTS
23  1646 23  0526   WBC 7.8 5.3   HGB 14.2 13.1   HCT 42.2 40.0   PLT See Reflexed IPF Result 157     BMP:   Recent Labs     23  1646   *   K 4.3   CO2 23   BUN 17   CREATININE 0.9   LABGLOM >60   GLUCOSE 378*     TSH: No results for input(s): TSH in the last 72 hours. Lipid: @LABALLVALUES(CHOL:1,HDL:1,CHOLHDLRAT:1,LDL:1,LDLHDLRAT:1,TRI)@  A1C: Invalid input(s): A1C  BNP: No results for input(s): BNP in the last 72 hours. PT/INR: No results for input(s): PROTIME, INR in the last 72 hours. APTT:No results for input(s): APTT in the last 72 hours. Troponins: 10, 11           ASSESSMENT:  Atypical Chest pain- troponin levels do not meet criteria. Pain was non-exertional and non radiating. Most recent EKG did not show ST changes. However, patient has a history of CAD and multiple risk factors associated with worsening CAD. Furthermore, in females and diabetic patients, pain does no always present in the typical fashion. Therefore, a high degree of suspicion is appropriate in this patient. Uncontrolled DM with Hyperglycemia- multiple blood glucose levels in 400s  CKD IIB  HLD  HTN  CAD      RECOMMENDATIONS:  Stress-test. If deemed low risk after test, may discharge home from a cardiac standpoint. NPO till stress test.      Please wait for final attestation from rounding attending. Gasper Nava. PGY1 FM Resident   [facility], [place], [place]        Attending Physician Statement:    I have discussed the care of Earl Chavis with Javier Otero MD, the Cardiology fellow and resident, including pertinent history and exam findings. I have seen and examined the patient and the key elements of all parts of the encounter have been performed by me. I agree with the assessment, plan and orders as documented by the fellow/resident, after I modified exam findings and plan of treatments, and the final version is my approved version of the assessment.      Additional

## 2023-07-12 NOTE — PLAN OF CARE
Problem: Chronic Conditions and Co-morbidities  Goal: Patient's chronic conditions and co-morbidity symptoms are monitored and maintained or improved  Outcome: Progressing     Problem: Pain  Goal: Verbalizes/displays adequate comfort level or baseline comfort level  Outcome: Progressing     Problem: Safety - Adult  Goal: Free from fall injury  Outcome: Progressing     Problem: Respiratory - Adult  Goal: Achieves optimal ventilation and oxygenation  7/12/2023 0000 by Marylene Screws, RCP  Outcome: Progressing  7/11/2023 2129 by Marylene Screws, RCP  Outcome: Progressing
Problem: Respiratory - Adult  Goal: Achieves optimal ventilation and oxygenation  7/12/2023 0000 by Chevis Aidee, RCP  Outcome: Progressing   NONINVASIVE VENTILATION    PROVIDE OPTIMAL VENTILATION/ACCEPTABLE SPO2   IMPLEMENT NONINVASIVE VENTILATION PROTOCOL   MAINTAIN ACCEPTABLE SPO2   ASSESS SKIN INTEGRITY/BREAKDOWN SCORE   PATIENT EDUCATION AS NEEDED   BIPAP AS NEEDED
Problem: Respiratory - Adult  Goal: Achieves optimal ventilation and oxygenation  Outcome: Progressing   BRONCHOSPASM/BRONCHOCONSTRICTION     [x]         IMPROVE AERATION/BREATH SOUNDS  [x]   ADMINISTER BRONCHODILATOR THERAPY AS APPROPRIATE  [x]   ASSESS BREATH SOUNDS  []   IMPLEMENT AEROSOL/MDI PROTOCOL  [x]   PATIENT EDUCATION AS NEEDED   PROVIDE ADEQUATE OXYGENATION WITH ACCEPTABLE SP02/ABG'S    [x]  IDENTIFY APPROPRIATE OXYGEN THERAPY  [x]   MONITOR SP02/ABG'S AS NEEDED   [x]   PATIENT EDUCATION AS NEEDED
No pertinent past medical history

## 2023-07-12 NOTE — H&P
[address]  CDU / OBSERVATION ENCOUNTER  ATTENDING NOTE     Pt Name: Uzma Kearney  MRN: [id number]  [address] [facility] 1950  Date of evaluation: 7/12/23  Patient's PCP is :  VERONIKA Matias - CNP    CHIEF COMPLAINT       Chief Complaint   Patient presents with    Chest Pain         HISTORY OF PRESENT ILLNESS    Uzma Kearney is a 67 y.o. female who presents complaints of chest pain. Patient with discomfort consistent with angina. Patient with chest pressure. Patient has had multiple cardiac risk factors and fairly typical pain. Patient has done well over the past year. Has PCP for follow-up and has not had any medication changes. Patient has not had any recent cardiac testing. Location/Symptom: Chest discomfort  Timing/Onset: Over the past 24 hours  Provocation: Unclear  Quality: Anginal-like  Radiation: None  Severity: Moderate initially, still present in a mild fashion  Timing/Duration: Hours  Modifying Factors: Unclear    History was obtained in part through review of the ED chart. When possible, a direct discussion was had with ED nurses, residents, and attendings  REVIEW OF SYSTEMS        General ROS - No fevers, No malaise   Ophthalmic ROS - No discharge, No changes in vision  ENT ROS -  No sore throat, No rhinorrhea,   Respiratory ROS - shortness of breath, no cough, no  wheezing  Cardiovascular ROS - chest pain, no dyspnea on exertion  Gastrointestinal ROS - No abdominal pain, no nausea or vomiting, no change in bowel habits, no black or bloody stools  Genito-Urinary ROS - No dysuria, trouble voiding, or hematuria  Musculoskeletal ROS - No myalgias, No arthalgias  Neurological ROS - No headache, no dizziness/lightheadedness, No focal weakness, no loss of sensation  Dermatological ROS - No lesions, No rash     (PQRS) Advance directives on face sheet per hospital policy.  No change unless specifically mentioned in chart    PAST MEDICAL HISTORY    has a past medical

## 2023-07-12 NOTE — ED NOTES
Report given to [facility], all questions answered.      Milton Tucker RN  07/11/23 2014

## 2023-07-12 NOTE — DISCHARGE SUMMARY
greater than or equal to 10% myocardium or stress segmental scores indicating multiple vascular territories with abnormalities 4. Stress-induced LV dilatation (TID ratio greater than 1.19 for exercise and greater than 1.39 for regadenoson) Intermediate risk (1% to 3% annual death or MI) 1. Mild/moderate resting LV dysfunction (LVEF 35% to 49%) not readily explained by non coronary causes. 2.  Resting perfusion abnormalities in 5%-9.9% of the myocardium in patients without a history or prior evidence of MI 3. Stress-induced perfusion abnormality encumbering 5%-9.9% of the myocardium or stress segmental scores indicating 1 vascular territory with abnormalities but without LV dilation 4. Small wall motion abnormality involving 1-2 segments and only 1 coronary bed. Low Risk (Less than 1% annual death or MI) 1. Normal or small myocardial perfusion defect at rest or with stress encumbering less than 5% of the myocardium. Physical Exam:    General appearance - NAD, AOx 3   Lungs -CTAB, no R/R/R  Heart - RRR, no M/R/G  Abdomen - Soft, NT/ND  Neurological:  MAEx4, No focal motor deficit, sensory loss  Extremities - Cap refil <2 sec in all ext., no edema  Skin -warm, dry      Hospital Course:  Clinical course has improved, labs and imaging reviewed. Pramod Mancera originally presented to the hospital on 7/11/2023  4:02 PM with complaints of chest pain described as pressure with an extensive cardiac history. At that time it was determined that She required further observation and evaluation by cardiology including a stress test. Labs and imaging were followed daily. Imaging results as above. She is medically stable to be discharged. Patient educated to follow-up with her cardiologist in 2 weeks, continue medication as prescribed.   Patient educated to follow-up with PCP within 1 week and to return to the emergency department with any returning or worsening symptoms      Disposition: Home    Patient

## 2023-07-13 ENCOUNTER — CARE COORDINATION (OUTPATIENT)
Dept: CASE MANAGEMENT | Age: 73
End: 2023-07-13

## 2023-07-13 DIAGNOSIS — I20.9 ANGINA PECTORIS (HCC): Primary | ICD-10-CM

## 2023-07-13 LAB
EKG ATRIAL RATE: 66 BPM
EKG ATRIAL RATE: 75 BPM
EKG P AXIS: 53 DEGREES
EKG P AXIS: 66 DEGREES
EKG P-R INTERVAL: 132 MS
EKG P-R INTERVAL: 140 MS
EKG Q-T INTERVAL: 362 MS
EKG Q-T INTERVAL: 422 MS
EKG QRS DURATION: 66 MS
EKG QRS DURATION: 74 MS
EKG QTC CALCULATION (BAZETT): 404 MS
EKG QTC CALCULATION (BAZETT): 442 MS
EKG R AXIS: -43 DEGREES
EKG R AXIS: -50 DEGREES
EKG T AXIS: 81 DEGREES
EKG T AXIS: 98 DEGREES
EKG VENTRICULAR RATE: 66 BPM
EKG VENTRICULAR RATE: 75 BPM

## 2023-07-13 PROCEDURE — 1111F DSCHRG MED/CURRENT MED MERGE: CPT | Performed by: NURSE PRACTITIONER

## 2023-07-13 PROCEDURE — 93010 ELECTROCARDIOGRAM REPORT: CPT | Performed by: INTERNAL MEDICINE

## 2023-07-13 NOTE — CARE COORDINATION
and risk factors.   Plan for next call:  follow up on chest pain, if PCP /cardio appointment was made    Pam Pretty RN

## 2023-07-13 NOTE — PROCEDURES
[address] [place] [address]                              CARDIAC STRESS TEST    PATIENT NAME: Ashley Martin                :        1950  MED REC NO:   [id number]                             ROOM:       5219  ACCOUNT NO:   [de-identified]                           ADMIT DATE: 2023  PROVIDER:     Juan Pearson    DATE OF STUDY:  2023  Ordering Provider: Kathleen Bocanegra MD    Interpreting Physician: MD Jose Ochoa Stress Study    Indication: Angina  Procedure Explained and Consent Signed  Medication: 0.4 mg    Resting Heart Rate:  74  BPM    Resting Blood Pressure:   104/58     mm/Hg    Infusion Blood Pressure:  111/58    mm/Hg    Resting EKG: NSR, PAC's, Non-specific ST-T changes, abnormal    Stress Heart Rate Response: Normal  Stress BP Response: Appropriate  Stress EKG: No changes seen    Chest Discomfort: No pain during testing, CP during recovery, 9/10. Ischemic EKG Changes: None    IMPRESSION: Negative    Electronically   Negative           Lexiscan Stress Study. Radio-isotope  results to follow from the Department of Nuclear Medicine.       [facility]    D: 2023 14:28:41       T: 2023 14:29:32     CLAUDIA/ALEX  Job#: [id number]     Doc#: Unknown    CC:

## 2023-07-14 DIAGNOSIS — Z79.4 TYPE 2 DIABETES MELLITUS WITH DIABETIC POLYNEUROPATHY, WITH LONG-TERM CURRENT USE OF INSULIN (HCC): ICD-10-CM

## 2023-07-14 DIAGNOSIS — F41.9 ANXIETY AND DEPRESSION: ICD-10-CM

## 2023-07-14 DIAGNOSIS — F32.A ANXIETY AND DEPRESSION: ICD-10-CM

## 2023-07-14 DIAGNOSIS — E11.42 TYPE 2 DIABETES MELLITUS WITH DIABETIC POLYNEUROPATHY, WITH LONG-TERM CURRENT USE OF INSULIN (HCC): ICD-10-CM

## 2023-07-14 RX ORDER — SITAGLIPTIN 50 MG/1
TABLET, FILM COATED ORAL
Qty: 30 TABLET | Refills: 2 | Status: ON HOLD | OUTPATIENT
Start: 2023-07-14 | End: 2023-09-09 | Stop reason: HOSPADM

## 2023-07-14 RX ORDER — BUPROPION HYDROCHLORIDE 150 MG/1
150 TABLET ORAL EVERY MORNING
Qty: 30 TABLET | Refills: 2 | OUTPATIENT
Start: 2023-07-14

## 2023-07-17 ENCOUNTER — HOSPITAL ENCOUNTER (OUTPATIENT)
Dept: PAIN MANAGEMENT | Age: 73
Discharge: HOME | End: 2023-07-17
Payer: MEDICARE

## 2023-07-17 VITALS
OXYGEN SATURATION: 87 % | WEIGHT: 228 LBS | SYSTOLIC BLOOD PRESSURE: 103 MMHG | TEMPERATURE: 97.3 F | DIASTOLIC BLOOD PRESSURE: 56 MMHG | BODY MASS INDEX: 41.96 KG/M2 | HEART RATE: 80 BPM | HEIGHT: 62 IN | RESPIRATION RATE: 16 BRPM

## 2023-07-17 DIAGNOSIS — M51.36 DDD (DEGENERATIVE DISC DISEASE), LUMBAR: ICD-10-CM

## 2023-07-17 DIAGNOSIS — M47.817 LUMBOSACRAL SPONDYLOSIS WITHOUT MYELOPATHY: Chronic | ICD-10-CM

## 2023-07-17 DIAGNOSIS — M46.1 SACROILIITIS, NOT ELSEWHERE CLASSIFIED (HCC): Chronic | ICD-10-CM

## 2023-07-17 DIAGNOSIS — M50.30 DDD (DEGENERATIVE DISC DISEASE), CERVICAL: Chronic | ICD-10-CM

## 2023-07-17 DIAGNOSIS — M54.16 LUMBAR RADICULOPATHY, CHRONIC: Primary | ICD-10-CM

## 2023-07-17 PROCEDURE — 99213 OFFICE O/P EST LOW 20 MIN: CPT

## 2023-07-17 PROCEDURE — 99213 OFFICE O/P EST LOW 20 MIN: CPT | Performed by: NURSE PRACTITIONER

## 2023-07-17 RX ORDER — OXYCODONE HYDROCHLORIDE AND ACETAMINOPHEN 5; 325 MG/1; MG/1
1 TABLET ORAL EVERY 8 HOURS PRN
Qty: 90 TABLET | Refills: 0 | Status: SHIPPED | OUTPATIENT
Start: 2023-07-19 | End: 2023-08-18

## 2023-07-17 ASSESSMENT — ENCOUNTER SYMPTOMS
CONSTIPATION: 0
COUGH: 0
BACK PAIN: 1
SHORTNESS OF BREATH: 0
BOWEL INCONTINENCE: 0

## 2023-07-19 ENCOUNTER — TELEPHONE (OUTPATIENT)
Dept: PRIMARY CARE CLINIC | Age: 73
End: 2023-07-19

## 2023-07-19 ENCOUNTER — CARE COORDINATION (OUTPATIENT)
Dept: CASE MANAGEMENT | Age: 73
End: 2023-07-19

## 2023-07-19 NOTE — TELEPHONE ENCOUNTER
Pt home care nurse called stating she has an appointment tomorrow and is reaching out to notify provider of some things in case patient forgets to discuss at appointment. States client is in need of some DME supplies and they will fax over info to assist with ordering these. They will need office note an script for incontinent supplies  such as pads and chux. Also states patient needs walker with a seat on it because the one client has is outdated, a reacher and a pulse ox. Nurse also states to discuss medication that client is taking to make sure it does no interfere with other medications. Propanolol and Prilosec .  Please advise at 7/20/23 appointment

## 2023-07-19 NOTE — CARE COORDINATION
Care Transitions Outreach Attempt #1    Call within 2 business days of discharge: Yes   Attempted to reach patient for transitions of care follow up. Unable to reach patient. HIPAA compliant message left on  requesting a return call. Patient: Ashley Martin Patient : 1950 MRN: [id number]    Last Discharge [address] [facility] [facility]       Date Complaint Diagnosis Description Type Department Provider    23 Chest Pain Angina pectoris [facility]) ED to Hosp-Admission (Discharged) (ADMITTED) [facility] 3C Herlinda Poole MD; [address] .. Was this an external facility discharge?  No Discharge Facility: [facility]    Noted following upcoming appointments from discharge chart review:   [facility] follow up appointment(s):   Future Appointments   Date Time Provider [address]   2023 11:00 AM VERONIKA Covarrubias CNP [facility]   2023  9:45 AM VERONIKA Covarrubias CNP [facility] Toni Pals   8/15/2023  9:00 AM Job Lepe DPM [facility]   8/15/2023 10:20 AM VERONIKA Nova CNP [facility] [address] LPN  Care Transition Nurse  [phone]

## 2023-07-20 ENCOUNTER — OFFICE VISIT (OUTPATIENT)
Dept: PRIMARY CARE CLINIC | Age: 73
End: 2023-07-20
Payer: MEDICARE

## 2023-07-20 VITALS
SYSTOLIC BLOOD PRESSURE: 132 MMHG | BODY MASS INDEX: 42.07 KG/M2 | DIASTOLIC BLOOD PRESSURE: 76 MMHG | HEART RATE: 76 BPM | OXYGEN SATURATION: 93 % | WEIGHT: 230 LBS

## 2023-07-20 DIAGNOSIS — Z79.4 TYPE 2 DIABETES MELLITUS WITH DIABETIC POLYNEUROPATHY, WITH LONG-TERM CURRENT USE OF INSULIN (HCC): Chronic | ICD-10-CM

## 2023-07-20 DIAGNOSIS — I50.32 CHRONIC DIASTOLIC (CONGESTIVE) HEART FAILURE (HCC): Primary | ICD-10-CM

## 2023-07-20 DIAGNOSIS — R32 URINARY INCONTINENCE IN FEMALE: ICD-10-CM

## 2023-07-20 DIAGNOSIS — E11.42 TYPE 2 DIABETES MELLITUS WITH DIABETIC POLYNEUROPATHY, WITH LONG-TERM CURRENT USE OF INSULIN (HCC): Chronic | ICD-10-CM

## 2023-07-20 DIAGNOSIS — D50.9 IRON DEFICIENCY ANEMIA, UNSPECIFIED IRON DEFICIENCY ANEMIA TYPE: ICD-10-CM

## 2023-07-20 DIAGNOSIS — R31.0 GROSS HEMATURIA: ICD-10-CM

## 2023-07-20 DIAGNOSIS — J44.1 CHRONIC OBSTRUCTIVE PULMONARY DISEASE WITH ACUTE EXACERBATION (HCC): ICD-10-CM

## 2023-07-20 DIAGNOSIS — I48.21 PERMANENT ATRIAL FIBRILLATION (HCC): ICD-10-CM

## 2023-07-20 PROCEDURE — 1123F ACP DISCUSS/DSCN MKR DOCD: CPT | Performed by: NURSE PRACTITIONER

## 2023-07-20 PROCEDURE — 0509F URINE INCON PLAN DOCD: CPT | Performed by: NURSE PRACTITIONER

## 2023-07-20 PROCEDURE — 2022F DILAT RTA XM EVC RTNOPTHY: CPT | Performed by: NURSE PRACTITIONER

## 2023-07-20 PROCEDURE — 3078F DIAST BP <80 MM HG: CPT | Performed by: NURSE PRACTITIONER

## 2023-07-20 PROCEDURE — 1036F TOBACCO NON-USER: CPT | Performed by: NURSE PRACTITIONER

## 2023-07-20 PROCEDURE — 3075F SYST BP GE 130 - 139MM HG: CPT | Performed by: NURSE PRACTITIONER

## 2023-07-20 PROCEDURE — 99214 OFFICE O/P EST MOD 30 MIN: CPT | Performed by: NURSE PRACTITIONER

## 2023-07-20 PROCEDURE — 1090F PRES/ABSN URINE INCON ASSESS: CPT | Performed by: NURSE PRACTITIONER

## 2023-07-20 PROCEDURE — 3046F HEMOGLOBIN A1C LEVEL >9.0%: CPT | Performed by: NURSE PRACTITIONER

## 2023-07-20 PROCEDURE — G8427 DOCREV CUR MEDS BY ELIG CLIN: HCPCS | Performed by: NURSE PRACTITIONER

## 2023-07-20 PROCEDURE — 3023F SPIROM DOC REV: CPT | Performed by: NURSE PRACTITIONER

## 2023-07-20 PROCEDURE — G8417 CALC BMI ABV UP PARAM F/U: HCPCS | Performed by: NURSE PRACTITIONER

## 2023-07-20 PROCEDURE — G8399 PT W/DXA RESULTS DOCUMENT: HCPCS | Performed by: NURSE PRACTITIONER

## 2023-07-20 PROCEDURE — 3017F COLORECTAL CA SCREEN DOC REV: CPT | Performed by: NURSE PRACTITIONER

## 2023-07-20 RX ORDER — FUROSEMIDE 20 MG/1
TABLET ORAL
Qty: 30 TABLET | Refills: 2 | Status: SHIPPED | OUTPATIENT
Start: 2023-07-20

## 2023-07-20 ASSESSMENT — ENCOUNTER SYMPTOMS
SHORTNESS OF BREATH: 1
VOMITING: 0
APNEA: 1
CHEST TIGHTNESS: 0
BLOOD IN STOOL: 0
NAUSEA: 0
TROUBLE SWALLOWING: 0
WHEEZING: 0
BACK PAIN: 1
ABDOMINAL PAIN: 0

## 2023-07-20 NOTE — PROGRESS NOTES
[address] [facility] PRIMARY CARE  [address] [address] [facility] [address]  Dept: [phone]  Dept Fax: [phone]    Yasmin Jarvis (:  1950) is a 67 y.o. female,Established patient, here for evaluation of the following chief complaint(s):  Follow-Up from Hospital (Pt was admitted from 23-23. DX: Angina pectoris )         ASSESSMENT/PLAN:  1. Chronic diastolic (congestive) heart failure (HCC)  -     DME Order for Isidro Monrealn as OP  -     DME Order for Pulse Ox as OP  -     DME Order for (Specify) as OP  2. Permanent atrial fibrillation ([address])  -     DME Order for Isidro Denise as OP  -     DME Order for Pulse Ox as OP  -     DME Order for (Specify) as OP  3. Chronic obstructive pulmonary disease with acute exacerbation ([address])  -     DME Order for Isidro Denise as OP  -     DME Order for Pulse Ox as OP  -     DME Order for (Specify) as OP  4. Urinary incontinence in female  -     Incontinence Supply Disposable MISC; Disp-60 each, R-3, PrintDisposable justin pad to change 2 times a day  -     Incontinence Supplies MISC; Disp-360 each, R-5, PrintDisposable incontinence liner pad, to change every 4 hours as needed for urinary incontinence  -     Nemo Cunha MD, Urology, [facility]  5. Type 2 diabetes mellitus with diabetic polyneuropathy, with long-term current use of insulin (HCC)  -     Incontinence Supply Disposable MISC; Disp-60 each, R-3, PrintDisposable justin pad to change 2 times a day  -     Incontinence Supplies MISC; Disp-360 each, R-5, PrintDisposable incontinence liner pad, to change every 4 hours as needed for urinary incontinence  -     Hemoglobin A1C; Future  -     Lipid Panel; Future  -     furosemide (LASIX) 20 MG tablet; TAKE 1 TABLET BY MOUTH ONCE DAILY AS NEEDED, Disp-30 tablet, R-2Normal  6. Gross hematuria  -     Nemo Cunha MD, Urology, [facility]  7.  Iron deficiency anemia, unspecified iron deficiency anemia type  -

## 2023-07-21 ENCOUNTER — CARE COORDINATION (OUTPATIENT)
Dept: CASE MANAGEMENT | Age: 73
End: 2023-07-21

## 2023-07-21 NOTE — CARE COORDINATION
[facility] Care Transitions Follow Up Call      Patient: Brijesh Koehler  Patient : 1950   MRN: [id number]  Reason for Admission: Angina pectoris  Discharge Date: 23 RARS: Readmission Risk Score: 16.7      Needs to be reviewed by the provider   Additional needs identified to be addressed with provider: No  none             Method of communication with provider: none. 1st attempt to reach patient for Care Transitions. [facility] requesting return call. Contact information provided.   [phone]      Follow Up  Future Appointments   Date Time Provider [address]   8/15/2023  9:00 AM MADISON Guaman [facility]   8/15/2023 10:20 AM VERONIKA Lopez  [facility] [place]   2023  9:20 AM MD Kurtis Chávez [facility]RO [facility]   2023 11:45 AM VERONIKA Castillo CNP [facility]          Care Transitions Subsequent and Final Call    Subsequent and Final Calls  Care Transitions Interventions  Other Interventions:             Gonzalo Hassan RN

## 2023-07-24 ENCOUNTER — CARE COORDINATION (OUTPATIENT)
Dept: CASE MANAGEMENT | Age: 73
End: 2023-07-24

## 2023-07-24 NOTE — CARE COORDINATION
Care Transitions Outreach Attempt #2      Attempt #2 to reach patient for transitions of care follow up. Unable to reach patient. Left message to identified VM requesting call back. CTN sign off if no return call received. Noted pt completed PCP HFU on , has order for pulse ox and walker, referral placed for Uronology, has appt on 23 scheduled. Patient: Maura Tinsley Patient : 1950 MRN: [id number]    Last Discharge [address] [facility],[address] [facility]       Date Complaint Diagnosis Description Type Department Provider    23 Chest Pain Angina pectoris [facility]) ED to Hosp-Admission (Discharged) (ADMITTED) [facility] 3C Abimbola Ramos MD; [address] .. Noted following upcoming appointments from discharge chart review:   [facility] follow up appointment(s):   Future Appointments   Date Time Provider [address]  [facility]   8/15/2023  9:00 [address] [facility] [address]   8/15/2023 10:20 AM Modesta Epley, APRN - CNP [facility] [address] [facility]   2023  9:20 AM MD Kurtis Soni [facility]RO [facility]   2023 11:45 AM VERONIKA Bautista CNP [facility] Jan Diamond RN BSN [facility]  Care Transitions Nurse  [phone]   [email or web address]

## 2023-08-09 DIAGNOSIS — E11.42 TYPE 2 DIABETES MELLITUS WITH DIABETIC POLYNEUROPATHY, WITH LONG-TERM CURRENT USE OF INSULIN (HCC): ICD-10-CM

## 2023-08-09 DIAGNOSIS — Z79.4 TYPE 2 DIABETES MELLITUS WITH DIABETIC POLYNEUROPATHY, WITH LONG-TERM CURRENT USE OF INSULIN (HCC): ICD-10-CM

## 2023-08-09 DIAGNOSIS — N39.3 STRESS INCONTINENCE: ICD-10-CM

## 2023-08-09 RX ORDER — OXYBUTYNIN CHLORIDE 5 MG/1
5 TABLET, EXTENDED RELEASE ORAL DAILY
Qty: 30 TABLET | Refills: 2 | Status: SHIPPED | OUTPATIENT
Start: 2023-08-09

## 2023-08-09 RX ORDER — ESCITALOPRAM OXALATE 20 MG/1
TABLET ORAL
Qty: 90 TABLET | Refills: 1 | Status: SHIPPED | OUTPATIENT
Start: 2023-08-09

## 2023-08-17 ENCOUNTER — HOSPITAL ENCOUNTER (OUTPATIENT)
Dept: PAIN MANAGEMENT | Age: 73
Discharge: HOME | End: 2023-08-17
Payer: MEDICARE

## 2023-08-17 VITALS
SYSTOLIC BLOOD PRESSURE: 157 MMHG | DIASTOLIC BLOOD PRESSURE: 91 MMHG | HEART RATE: 80 BPM | WEIGHT: 230 LBS | OXYGEN SATURATION: 90 % | BODY MASS INDEX: 42.33 KG/M2 | HEIGHT: 62 IN

## 2023-08-17 DIAGNOSIS — M54.16 LUMBAR RADICULOPATHY, CHRONIC: ICD-10-CM

## 2023-08-17 DIAGNOSIS — M50.30 DDD (DEGENERATIVE DISC DISEASE), CERVICAL: Chronic | ICD-10-CM

## 2023-08-17 DIAGNOSIS — Z79.891 CHRONIC USE OF OPIATE FOR THERAPEUTIC PURPOSE: Primary | ICD-10-CM

## 2023-08-17 DIAGNOSIS — M47.817 LUMBOSACRAL SPONDYLOSIS WITHOUT MYELOPATHY: Chronic | ICD-10-CM

## 2023-08-17 DIAGNOSIS — M46.1 SACROILIITIS, NOT ELSEWHERE CLASSIFIED (HCC): Chronic | ICD-10-CM

## 2023-08-17 PROCEDURE — 99213 OFFICE O/P EST LOW 20 MIN: CPT | Performed by: NURSE PRACTITIONER

## 2023-08-17 PROCEDURE — 99213 OFFICE O/P EST LOW 20 MIN: CPT

## 2023-08-17 RX ORDER — OXYCODONE HYDROCHLORIDE AND ACETAMINOPHEN 5; 325 MG/1; MG/1
1 TABLET ORAL EVERY 8 HOURS PRN
Qty: 90 TABLET | Refills: 0 | Status: SHIPPED | OUTPATIENT
Start: 2023-08-18 | End: 2023-09-17

## 2023-08-17 ASSESSMENT — PAIN SCALES - GENERAL: PAINLEVEL_OUTOF10: 8

## 2023-08-17 ASSESSMENT — ENCOUNTER SYMPTOMS
BACK PAIN: 1
BOWEL INCONTINENCE: 0
COUGH: 0
CONSTIPATION: 0
SHORTNESS OF BREATH: 0

## 2023-08-17 ASSESSMENT — PAIN DESCRIPTION - LOCATION: LOCATION: BACK

## 2023-08-17 ASSESSMENT — PAIN DESCRIPTION - FREQUENCY: FREQUENCY: INTERMITTENT

## 2023-08-17 ASSESSMENT — PAIN DESCRIPTION - PAIN TYPE: TYPE: CHRONIC PAIN

## 2023-08-17 ASSESSMENT — PAIN DESCRIPTION - DESCRIPTORS: DESCRIPTORS: STABBING

## 2023-09-05 ENCOUNTER — HOSPITAL ENCOUNTER (INPATIENT)
Age: 73
LOS: 4 days | Discharge: HOME | End: 2023-09-09
Attending: EMERGENCY MEDICINE
Payer: MEDICARE

## 2023-09-05 ENCOUNTER — APPOINTMENT (OUTPATIENT)
Dept: GENERAL RADIOLOGY | Age: 73
End: 2023-09-05
Payer: MEDICARE

## 2023-09-05 DIAGNOSIS — R73.9 HYPERGLYCEMIA: ICD-10-CM

## 2023-09-05 DIAGNOSIS — T82.330A: Primary | ICD-10-CM

## 2023-09-05 DIAGNOSIS — J44.1 COPD EXACERBATION (HCC): ICD-10-CM

## 2023-09-05 DIAGNOSIS — R07.89 CHEST PRESSURE: ICD-10-CM

## 2023-09-05 LAB
ALBUMIN SERPL-MCNC: 3.9 G/DL (ref 3.5–5.2)
ALBUMIN/GLOB SERPL: 1.2 {RATIO} (ref 1–2.5)
ALP SERPL-CCNC: 88 U/L (ref 35–104)
ALT SERPL-CCNC: 26 U/L (ref 5–33)
ANION GAP SERPL CALCULATED.3IONS-SCNC: 14 MMOL/L (ref 9–17)
ANION GAP SERPL CALCULATED.3IONS-SCNC: 16 MMOL/L (ref 9–17)
AST SERPL-CCNC: 30 U/L
B-OH-BUTYR SERPL-MCNC: 0.35 MMOL/L (ref 0.02–0.27)
BASOPHILS # BLD: 0.09 K/UL (ref 0–0.2)
BASOPHILS NFR BLD: 1 % (ref 0–2)
BILIRUB SERPL-MCNC: 0.4 MG/DL (ref 0.3–1.2)
BILIRUB UR QL STRIP: NEGATIVE
BNP SERPL-MCNC: 118 PG/ML
BODY TEMPERATURE: 37
BUN SERPL-MCNC: 18 MG/DL (ref 8–23)
BUN SERPL-MCNC: 19 MG/DL (ref 8–23)
CALCIUM SERPL-MCNC: 8.7 MG/DL (ref 8.6–10.4)
CALCIUM SERPL-MCNC: 9.3 MG/DL (ref 8.6–10.4)
CHLORIDE SERPL-SCNC: 89 MMOL/L (ref 98–107)
CHLORIDE SERPL-SCNC: 93 MMOL/L (ref 98–107)
CLARITY UR: CLEAR
CO2 SERPL-SCNC: 23 MMOL/L (ref 20–31)
CO2 SERPL-SCNC: 25 MMOL/L (ref 20–31)
COHGB MFR BLD: 2.2 % (ref 0–5)
COLOR UR: YELLOW
COMMENT: ABNORMAL
CREAT SERPL-MCNC: 1.1 MG/DL (ref 0.5–0.9)
CREAT SERPL-MCNC: 1.1 MG/DL (ref 0.5–0.9)
EOSINOPHIL # BLD: 0.12 K/UL (ref 0–0.44)
EOSINOPHILS RELATIVE PERCENT: 1 % (ref 1–4)
ERYTHROCYTE [DISTWIDTH] IN BLOOD BY AUTOMATED COUNT: 12.4 % (ref 11.8–14.4)
FIO2 ON VENT: ABNORMAL %
GFR SERPL CREATININE-BSD FRML MDRD: 53 ML/MIN/1.73M2
GFR SERPL CREATININE-BSD FRML MDRD: 53 ML/MIN/1.73M2
GLUCOSE BLD-MCNC: 526 MG/DL (ref 65–105)
GLUCOSE SERPL-MCNC: 377 MG/DL (ref 70–99)
GLUCOSE SERPL-MCNC: 579 MG/DL (ref 70–99)
GLUCOSE UR STRIP-MCNC: ABNORMAL MG/DL
HCO3 VENOUS: 26.6 MMOL/L (ref 24–30)
HCT VFR BLD AUTO: 44.1 % (ref 36.3–47.1)
HGB BLD-MCNC: 14.6 G/DL (ref 11.9–15.1)
HGB UR QL STRIP.AUTO: NEGATIVE
IMM GRANULOCYTES # BLD AUTO: 0.05 K/UL (ref 0–0.3)
IMM GRANULOCYTES NFR BLD: 1 %
KETONES UR STRIP-MCNC: NEGATIVE MG/DL
LACTIC ACID, WHOLE BLOOD: 3 MMOL/L (ref 0.7–2.1)
LACTIC ACID, WHOLE BLOOD: 4.1 MMOL/L (ref 0.7–2.1)
LEUKOCYTE ESTERASE UR QL STRIP: NEGATIVE
LYMPHOCYTES NFR BLD: 1.97 K/UL (ref 1.1–3.7)
LYMPHOCYTES RELATIVE PERCENT: 21 % (ref 24–43)
MCH RBC QN AUTO: 30.4 PG (ref 25.2–33.5)
MCHC RBC AUTO-ENTMCNC: 33.1 G/DL (ref 28.4–34.8)
MCV RBC AUTO: 91.7 FL (ref 82.6–102.9)
MONOCYTES NFR BLD: 0.83 K/UL (ref 0.1–1.2)
MONOCYTES NFR BLD: 9 % (ref 3–12)
NEGATIVE BASE EXCESS, VEN: 0.8 MMOL/L (ref 0–2)
NEUTROPHILS NFR BLD: 67 % (ref 36–65)
NEUTS SEG NFR BLD: 6.18 K/UL (ref 1.5–8.1)
NITRITE UR QL STRIP: NEGATIVE
NRBC BLD-RTO: 0 PER 100 WBC
O2 SAT, VEN: 73.7 % (ref 60–85)
OSMOLALITY SERPL: 312 MOSM/KG (ref 275–295)
PCO2, VEN: 58.8 MM HG (ref 39–55)
PH UR STRIP: 5 [PH] (ref 5–8)
PH VENOUS: 7.28 (ref 7.32–7.42)
PLATELET # BLD AUTO: ABNORMAL K/UL (ref 138–453)
PLATELET, FLUORESCENCE: ABNORMAL K/UL (ref 138–453)
PLATELETS.RETICULATED NFR BLD AUTO: 11.8 % (ref 1.1–10.3)
PO2, VEN: 44.9 MM HG (ref 30–50)
POTASSIUM SERPL-SCNC: 3.5 MMOL/L (ref 3.7–5.3)
POTASSIUM SERPL-SCNC: 4.5 MMOL/L (ref 3.7–5.3)
PROT SERPL-MCNC: 7.2 G/DL (ref 6.4–8.3)
PROT UR STRIP-MCNC: NEGATIVE MG/DL
RBC # BLD AUTO: 4.81 M/UL (ref 3.95–5.11)
REASON FOR REJECTION: NORMAL
SODIUM SERPL-SCNC: 128 MMOL/L (ref 135–144)
SODIUM SERPL-SCNC: 132 MMOL/L (ref 135–144)
SP GR UR STRIP: 1.04 (ref 1–1.03)
SPECIMEN SOURCE: NORMAL
TROPONIN I SERPL HS-MCNC: 12 NG/L (ref 0–14)
TROPONIN I SERPL HS-MCNC: 13 NG/L (ref 0–14)
UROBILINOGEN UR STRIP-ACNC: NORMAL EU/DL (ref 0–1)
WBC OTHER # BLD: 9.2 K/UL (ref 3.5–11.3)
ZZ NTE CLEAN UP: ORDERED TEST: NORMAL

## 2023-09-05 PROCEDURE — 71045 X-RAY EXAM CHEST 1 VIEW: CPT

## 2023-09-05 PROCEDURE — 81003 URINALYSIS AUTO W/O SCOPE: CPT

## 2023-09-05 PROCEDURE — 2700000000 HC OXYGEN THERAPY PER DAY

## 2023-09-05 PROCEDURE — 6370000000 HC RX 637 (ALT 250 FOR IP)

## 2023-09-05 PROCEDURE — 6370000000 HC RX 637 (ALT 250 FOR IP): Performed by: EMERGENCY MEDICINE

## 2023-09-05 PROCEDURE — 94761 N-INVAS EAR/PLS OXIMETRY MLT: CPT

## 2023-09-05 PROCEDURE — 2580000003 HC RX 258: Performed by: EMERGENCY MEDICINE

## 2023-09-05 PROCEDURE — 83605 ASSAY OF LACTIC ACID: CPT

## 2023-09-05 PROCEDURE — 1200000000 HC SEMI PRIVATE

## 2023-09-05 PROCEDURE — 2580000003 HC RX 258

## 2023-09-05 PROCEDURE — 94640 AIRWAY INHALATION TREATMENT: CPT

## 2023-09-05 PROCEDURE — 6360000002 HC RX W HCPCS

## 2023-09-05 PROCEDURE — 87040 BLOOD CULTURE FOR BACTERIA: CPT

## 2023-09-05 PROCEDURE — 85055 RETICULATED PLATELET ASSAY: CPT

## 2023-09-05 PROCEDURE — 83930 ASSAY OF BLOOD OSMOLALITY: CPT

## 2023-09-05 PROCEDURE — 84484 ASSAY OF TROPONIN QUANT: CPT

## 2023-09-05 PROCEDURE — 82947 ASSAY GLUCOSE BLOOD QUANT: CPT

## 2023-09-05 PROCEDURE — 85025 COMPLETE CBC W/AUTO DIFF WBC: CPT

## 2023-09-05 PROCEDURE — 99285 EMERGENCY DEPT VISIT HI MDM: CPT

## 2023-09-05 PROCEDURE — 82010 KETONE BODYS QUAN: CPT

## 2023-09-05 PROCEDURE — 80048 BASIC METABOLIC PNL TOTAL CA: CPT

## 2023-09-05 PROCEDURE — 36415 COLL VENOUS BLD VENIPUNCTURE: CPT

## 2023-09-05 PROCEDURE — 83880 ASSAY OF NATRIURETIC PEPTIDE: CPT

## 2023-09-05 PROCEDURE — 82805 BLOOD GASES W/O2 SATURATION: CPT

## 2023-09-05 PROCEDURE — 93005 ELECTROCARDIOGRAM TRACING: CPT

## 2023-09-05 PROCEDURE — 80053 COMPREHEN METABOLIC PANEL: CPT

## 2023-09-05 PROCEDURE — 96372 THER/PROPH/DIAG INJ SC/IM: CPT

## 2023-09-05 PROCEDURE — 93005 ELECTROCARDIOGRAM TRACING: CPT | Performed by: EMERGENCY MEDICINE

## 2023-09-05 RX ORDER — IPRATROPIUM BROMIDE AND ALBUTEROL SULFATE 2.5; .5 MG/3ML; MG/3ML
1 SOLUTION RESPIRATORY (INHALATION)
Status: DISCONTINUED | OUTPATIENT
Start: 2023-09-06 | End: 2023-09-06

## 2023-09-05 RX ORDER — IPRATROPIUM BROMIDE AND ALBUTEROL SULFATE 2.5; .5 MG/3ML; MG/3ML
1 SOLUTION RESPIRATORY (INHALATION) EVERY 4 HOURS PRN
Status: DISCONTINUED | OUTPATIENT
Start: 2023-09-05 | End: 2023-09-09 | Stop reason: HOSPADM

## 2023-09-05 RX ORDER — LOSARTAN POTASSIUM 25 MG/1
25 TABLET ORAL DAILY
Status: DISCONTINUED | OUTPATIENT
Start: 2023-09-06 | End: 2023-09-08

## 2023-09-05 RX ORDER — DEXTROSE MONOHYDRATE 100 MG/ML
INJECTION, SOLUTION INTRAVENOUS CONTINUOUS PRN
Status: DISCONTINUED | OUTPATIENT
Start: 2023-09-05 | End: 2023-09-09 | Stop reason: HOSPADM

## 2023-09-05 RX ORDER — ONDANSETRON 2 MG/ML
4 INJECTION INTRAMUSCULAR; INTRAVENOUS EVERY 6 HOURS PRN
Status: DISCONTINUED | OUTPATIENT
Start: 2023-09-05 | End: 2023-09-09 | Stop reason: HOSPADM

## 2023-09-05 RX ORDER — SODIUM CHLORIDE 0.9 % (FLUSH) 0.9 %
5-40 SYRINGE (ML) INJECTION PRN
Status: DISCONTINUED | OUTPATIENT
Start: 2023-09-05 | End: 2023-09-09 | Stop reason: HOSPADM

## 2023-09-05 RX ORDER — OXYCODONE HYDROCHLORIDE 5 MG/1
5 TABLET ORAL ONCE
Status: COMPLETED | OUTPATIENT
Start: 2023-09-05 | End: 2023-09-05

## 2023-09-05 RX ORDER — INSULIN GLARGINE 100 [IU]/ML
55 INJECTION, SOLUTION SUBCUTANEOUS 2 TIMES DAILY
Status: DISCONTINUED | OUTPATIENT
Start: 2023-09-05 | End: 2023-09-06

## 2023-09-05 RX ORDER — ACETAMINOPHEN 325 MG/1
650 TABLET ORAL ONCE
Status: COMPLETED | OUTPATIENT
Start: 2023-09-05 | End: 2023-09-05

## 2023-09-05 RX ORDER — INSULIN LISPRO 100 [IU]/ML
0-8 INJECTION, SOLUTION INTRAVENOUS; SUBCUTANEOUS
Status: DISCONTINUED | OUTPATIENT
Start: 2023-09-06 | End: 2023-09-06

## 2023-09-05 RX ORDER — ISOSORBIDE DINITRATE 10 MG/1
30 TABLET ORAL DAILY
Status: DISCONTINUED | OUTPATIENT
Start: 2023-09-06 | End: 2023-09-09 | Stop reason: HOSPADM

## 2023-09-05 RX ORDER — ESCITALOPRAM OXALATE 10 MG/1
20 TABLET ORAL EVERY MORNING
Status: DISCONTINUED | OUTPATIENT
Start: 2023-09-06 | End: 2023-09-09 | Stop reason: HOSPADM

## 2023-09-05 RX ORDER — ALBUTEROL SULFATE 2.5 MG/3ML
2.5 SOLUTION RESPIRATORY (INHALATION)
Status: DISCONTINUED | OUTPATIENT
Start: 2023-09-05 | End: 2023-09-09 | Stop reason: HOSPADM

## 2023-09-05 RX ORDER — BENZONATATE 100 MG/1
100 CAPSULE ORAL 3 TIMES DAILY PRN
Status: DISCONTINUED | OUTPATIENT
Start: 2023-09-05 | End: 2023-09-09 | Stop reason: HOSPADM

## 2023-09-05 RX ORDER — ACETAMINOPHEN 325 MG/1
650 TABLET ORAL EVERY 6 HOURS PRN
Status: DISCONTINUED | OUTPATIENT
Start: 2023-09-05 | End: 2023-09-09 | Stop reason: HOSPADM

## 2023-09-05 RX ORDER — LANOLIN ALCOHOL/MO/W.PET/CERES
325 CREAM (GRAM) TOPICAL
Status: DISCONTINUED | OUTPATIENT
Start: 2023-09-06 | End: 2023-09-07

## 2023-09-05 RX ORDER — SODIUM CHLORIDE 9 MG/ML
INJECTION, SOLUTION INTRAVENOUS CONTINUOUS
Status: DISCONTINUED | OUTPATIENT
Start: 2023-09-05 | End: 2023-09-06

## 2023-09-05 RX ORDER — 0.9 % SODIUM CHLORIDE 0.9 %
1000 INTRAVENOUS SOLUTION INTRAVENOUS ONCE
Status: COMPLETED | OUTPATIENT
Start: 2023-09-05 | End: 2023-09-05

## 2023-09-05 RX ORDER — ASPIRIN 81 MG/1
81 TABLET ORAL DAILY
Status: DISCONTINUED | OUTPATIENT
Start: 2023-09-06 | End: 2023-09-09 | Stop reason: HOSPADM

## 2023-09-05 RX ORDER — DOCUSATE SODIUM 100 MG/1
100 CAPSULE, LIQUID FILLED ORAL 2 TIMES DAILY
Status: DISCONTINUED | OUTPATIENT
Start: 2023-09-05 | End: 2023-09-09 | Stop reason: HOSPADM

## 2023-09-05 RX ORDER — ENOXAPARIN SODIUM 100 MG/ML
30 INJECTION SUBCUTANEOUS 2 TIMES DAILY
Status: DISCONTINUED | OUTPATIENT
Start: 2023-09-05 | End: 2023-09-09 | Stop reason: HOSPADM

## 2023-09-05 RX ORDER — CARVEDILOL 3.12 MG/1
3.12 TABLET ORAL 2 TIMES DAILY
Status: DISCONTINUED | OUTPATIENT
Start: 2023-09-05 | End: 2023-09-08

## 2023-09-05 RX ORDER — ACETAMINOPHEN 650 MG/1
650 SUPPOSITORY RECTAL EVERY 6 HOURS PRN
Status: DISCONTINUED | OUTPATIENT
Start: 2023-09-05 | End: 2023-09-09 | Stop reason: HOSPADM

## 2023-09-05 RX ORDER — FENOFIBRATE 160 MG/1
160 TABLET ORAL DAILY
Status: DISCONTINUED | OUTPATIENT
Start: 2023-09-06 | End: 2023-09-09 | Stop reason: HOSPADM

## 2023-09-05 RX ORDER — INSULIN LISPRO 100 [IU]/ML
10 INJECTION, SOLUTION INTRAVENOUS; SUBCUTANEOUS ONCE
Status: COMPLETED | OUTPATIENT
Start: 2023-09-05 | End: 2023-09-05

## 2023-09-05 RX ORDER — PANTOPRAZOLE SODIUM 40 MG/1
40 TABLET, DELAYED RELEASE ORAL 2 TIMES DAILY
Status: DISCONTINUED | OUTPATIENT
Start: 2023-09-05 | End: 2023-09-09 | Stop reason: HOSPADM

## 2023-09-05 RX ORDER — POLYETHYLENE GLYCOL 3350 17 G/17G
17 POWDER, FOR SOLUTION ORAL DAILY PRN
Status: DISCONTINUED | OUTPATIENT
Start: 2023-09-05 | End: 2023-09-09 | Stop reason: HOSPADM

## 2023-09-05 RX ORDER — ONDANSETRON 4 MG/1
4 TABLET, ORALLY DISINTEGRATING ORAL EVERY 8 HOURS PRN
Status: DISCONTINUED | OUTPATIENT
Start: 2023-09-05 | End: 2023-09-09 | Stop reason: HOSPADM

## 2023-09-05 RX ORDER — SODIUM CHLORIDE 0.9 % (FLUSH) 0.9 %
5-40 SYRINGE (ML) INJECTION EVERY 12 HOURS SCHEDULED
Status: DISCONTINUED | OUTPATIENT
Start: 2023-09-05 | End: 2023-09-09 | Stop reason: HOSPADM

## 2023-09-05 RX ORDER — OXYBUTYNIN CHLORIDE 5 MG/1
5 TABLET, EXTENDED RELEASE ORAL DAILY
Status: DISCONTINUED | OUTPATIENT
Start: 2023-09-06 | End: 2023-09-09 | Stop reason: HOSPADM

## 2023-09-05 RX ORDER — SODIUM CHLORIDE 9 MG/ML
INJECTION, SOLUTION INTRAVENOUS PRN
Status: DISCONTINUED | OUTPATIENT
Start: 2023-09-05 | End: 2023-09-09 | Stop reason: HOSPADM

## 2023-09-05 RX ORDER — INSULIN LISPRO 100 [IU]/ML
0-4 INJECTION, SOLUTION INTRAVENOUS; SUBCUTANEOUS NIGHTLY
Status: DISCONTINUED | OUTPATIENT
Start: 2023-09-05 | End: 2023-09-06

## 2023-09-05 RX ADMIN — AZITHROMYCIN MONOHYDRATE 500 MG: 500 INJECTION, POWDER, LYOPHILIZED, FOR SOLUTION INTRAVENOUS at 21:50

## 2023-09-05 RX ADMIN — SODIUM CHLORIDE 1000 ML: 9 INJECTION, SOLUTION INTRAVENOUS at 20:38

## 2023-09-05 RX ADMIN — IPRATROPIUM BROMIDE AND ALBUTEROL SULFATE 1 DOSE: 2.5; .5 SOLUTION RESPIRATORY (INHALATION) at 16:31

## 2023-09-05 RX ADMIN — SODIUM CHLORIDE 1000 ML: 9 INJECTION, SOLUTION INTRAVENOUS at 17:23

## 2023-09-05 RX ADMIN — ACETAMINOPHEN 650 MG: 325 TABLET ORAL at 18:56

## 2023-09-05 RX ADMIN — INSULIN LISPRO 10 UNITS: 100 INJECTION, SOLUTION INTRAVENOUS; SUBCUTANEOUS at 17:23

## 2023-09-05 RX ADMIN — OXYCODONE HYDROCHLORIDE 5 MG: 5 TABLET ORAL at 18:56

## 2023-09-05 ASSESSMENT — ENCOUNTER SYMPTOMS
DIARRHEA: 0
SHORTNESS OF BREATH: 1
COUGH: 0
ABDOMINAL PAIN: 1
VOMITING: 0
BLOOD IN STOOL: 0
NAUSEA: 0

## 2023-09-05 ASSESSMENT — PAIN - FUNCTIONAL ASSESSMENT: PAIN_FUNCTIONAL_ASSESSMENT: NONE - DENIES PAIN

## 2023-09-05 NOTE — ED NOTES
The following labs were labeled with appropriate pt sticker and tubed to lab:     [] Blue     [] Lavender   [] on ice  [x] Green/yellow  [] Green/black [] on ice  [] Jaspreet Leech  [] on ice  [] Yellow  [] Red  [] Type/ Screen  [] ABG  [] VBG    [] COVID-19 swab    [] Rapid  [] PCR  [] Flu swab  [] Peds Viral Panel     [] Urine Sample  [] Fecal Sample  [] Pelvic Cultures  [x] Blood Cultures  [] X 2  [] STREP Cultures       Cornelious Kussmaul, RN  09/05/23 9139

## 2023-09-05 NOTE — ED NOTES
Patient presents to the ED by EMS for shortness of breath for the last 2 days. She notes a history of COPD, emphysema, and asthma. She reports that she is on 3 liters of home oxygen. She denies chest pain at this time. She reports abdominal pain but denies diarrhea. She reports concerns for a possible vaginal yeast infection for the last 2 days. She reports itching and burning with urination. Patient is alert and oriented X4. Vitals obtained. Plan of care ongoing. Patient expresses no other needs at this time.  Call light within reach      Jaylon Gray RN  09/05/23 4440

## 2023-09-06 ENCOUNTER — APPOINTMENT (OUTPATIENT)
Dept: GENERAL RADIOLOGY | Age: 73
End: 2023-09-06
Payer: MEDICARE

## 2023-09-06 PROBLEM — E11.00 TYPE 2 DIABETES MELLITUS WITH HYPEROSMOLAR NONKETOTIC HYPERGLYCEMIA (HCC): Status: ACTIVE | Noted: 2023-09-06

## 2023-09-06 LAB
ALBUMIN SERPL-MCNC: 3.6 G/DL (ref 3.5–5.2)
ALBUMIN/GLOB SERPL: 1.3 {RATIO} (ref 1–2.5)
ALP SERPL-CCNC: 69 U/L (ref 35–104)
ALT SERPL-CCNC: 18 U/L (ref 5–33)
ANION GAP SERPL CALCULATED.3IONS-SCNC: 10 MMOL/L (ref 9–17)
ANION GAP SERPL CALCULATED.3IONS-SCNC: 11 MMOL/L (ref 9–17)
ANION GAP SERPL CALCULATED.3IONS-SCNC: 12 MMOL/L (ref 9–17)
AST SERPL-CCNC: 16 U/L
BASOPHILS # BLD: 0 K/UL (ref 0–0.2)
BASOPHILS # BLD: 0.07 K/UL (ref 0–0.2)
BASOPHILS NFR BLD: 0 % (ref 0–2)
BASOPHILS NFR BLD: 1 % (ref 0–2)
BILIRUB DIRECT SERPL-MCNC: 0.1 MG/DL
BILIRUB INDIRECT SERPL-MCNC: 0.2 MG/DL (ref 0–1)
BILIRUB SERPL-MCNC: 0.3 MG/DL (ref 0.3–1.2)
BNP SERPL-MCNC: 295 PG/ML
BUN SERPL-MCNC: 16 MG/DL (ref 8–23)
BUN SERPL-MCNC: 18 MG/DL (ref 8–23)
BUN SERPL-MCNC: 19 MG/DL (ref 8–23)
CALCIUM SERPL-MCNC: 8.4 MG/DL (ref 8.6–10.4)
CALCIUM SERPL-MCNC: 8.5 MG/DL (ref 8.6–10.4)
CALCIUM SERPL-MCNC: 8.6 MG/DL (ref 8.6–10.4)
CHLORIDE SERPL-SCNC: 100 MMOL/L (ref 98–107)
CHLORIDE SERPL-SCNC: 97 MMOL/L (ref 98–107)
CHLORIDE SERPL-SCNC: 98 MMOL/L (ref 98–107)
CO2 SERPL-SCNC: 22 MMOL/L (ref 20–31)
CO2 SERPL-SCNC: 25 MMOL/L (ref 20–31)
CO2 SERPL-SCNC: 26 MMOL/L (ref 20–31)
CREAT SERPL-MCNC: 0.9 MG/DL (ref 0.5–0.9)
EKG ATRIAL RATE: 87 BPM
EKG ATRIAL RATE: 89 BPM
EKG P AXIS: 60 DEGREES
EKG P AXIS: 70 DEGREES
EKG P-R INTERVAL: 124 MS
EKG P-R INTERVAL: 134 MS
EKG Q-T INTERVAL: 376 MS
EKG Q-T INTERVAL: 416 MS
EKG QRS DURATION: 66 MS
EKG QRS DURATION: 76 MS
EKG QTC CALCULATION (BAZETT): 452 MS
EKG QTC CALCULATION (BAZETT): 506 MS
EKG R AXIS: -39 DEGREES
EKG R AXIS: -43 DEGREES
EKG T AXIS: 104 DEGREES
EKG T AXIS: 84 DEGREES
EKG VENTRICULAR RATE: 87 BPM
EKG VENTRICULAR RATE: 89 BPM
EOSINOPHIL # BLD: 0 K/UL (ref 0–0.4)
EOSINOPHIL # BLD: 0.17 K/UL (ref 0–0.44)
EOSINOPHILS RELATIVE PERCENT: 0 % (ref 1–4)
EOSINOPHILS RELATIVE PERCENT: 2 % (ref 1–4)
ERYTHROCYTE [DISTWIDTH] IN BLOOD BY AUTOMATED COUNT: 12.4 % (ref 11.8–14.4)
ERYTHROCYTE [DISTWIDTH] IN BLOOD BY AUTOMATED COUNT: 12.4 % (ref 11.8–14.4)
GFR SERPL CREATININE-BSD FRML MDRD: >60 ML/MIN/1.73M2
GLUCOSE BLD-MCNC: 142 MG/DL (ref 65–105)
GLUCOSE BLD-MCNC: 146 MG/DL (ref 65–105)
GLUCOSE BLD-MCNC: 188 MG/DL (ref 65–105)
GLUCOSE BLD-MCNC: 215 MG/DL
GLUCOSE BLD-MCNC: 215 MG/DL (ref 65–105)
GLUCOSE BLD-MCNC: 252 MG/DL (ref 65–105)
GLUCOSE BLD-MCNC: 288 MG/DL (ref 65–105)
GLUCOSE BLD-MCNC: 295 MG/DL (ref 65–105)
GLUCOSE BLD-MCNC: 327 MG/DL (ref 65–105)
GLUCOSE BLD-MCNC: 340 MG/DL (ref 65–105)
GLUCOSE BLD-MCNC: 402 MG/DL (ref 65–105)
GLUCOSE BLD-MCNC: 434 MG/DL (ref 65–105)
GLUCOSE SERPL-MCNC: 171 MG/DL (ref 70–99)
GLUCOSE SERPL-MCNC: 389 MG/DL (ref 70–99)
GLUCOSE SERPL-MCNC: 438 MG/DL (ref 70–99)
HCT VFR BLD AUTO: 39.1 % (ref 36.3–47.1)
HCT VFR BLD AUTO: 39.8 % (ref 36.3–47.1)
HGB BLD-MCNC: 12.6 G/DL (ref 11.9–15.1)
HGB BLD-MCNC: 13.3 G/DL (ref 11.9–15.1)
IMM GRANULOCYTES # BLD AUTO: 0.03 K/UL (ref 0–0.3)
IMM GRANULOCYTES # BLD AUTO: 0.07 K/UL (ref 0–0.3)
IMM GRANULOCYTES NFR BLD: 0 %
IMM GRANULOCYTES NFR BLD: 1 %
LYMPHOCYTES NFR BLD: 0.6 K/UL (ref 1–4.8)
LYMPHOCYTES NFR BLD: 2.25 K/UL (ref 1.1–3.7)
LYMPHOCYTES RELATIVE PERCENT: 29 % (ref 24–43)
LYMPHOCYTES RELATIVE PERCENT: 9 % (ref 24–44)
MAGNESIUM SERPL-MCNC: 1.5 MG/DL (ref 1.6–2.6)
MCH RBC QN AUTO: 30.2 PG (ref 25.2–33.5)
MCH RBC QN AUTO: 30.6 PG (ref 25.2–33.5)
MCHC RBC AUTO-ENTMCNC: 32.2 G/DL (ref 28.4–34.8)
MCHC RBC AUTO-ENTMCNC: 33.4 G/DL (ref 28.4–34.8)
MCV RBC AUTO: 91.7 FL (ref 82.6–102.9)
MCV RBC AUTO: 93.8 FL (ref 82.6–102.9)
MONOCYTES NFR BLD: 0.07 K/UL (ref 0.1–0.8)
MONOCYTES NFR BLD: 0.67 K/UL (ref 0.1–1.2)
MONOCYTES NFR BLD: 1 % (ref 1–7)
MONOCYTES NFR BLD: 9 % (ref 3–12)
MORPHOLOGY: NORMAL
MYOGLOBIN SERPL-MCNC: 34 NG/ML (ref 25–58)
MYOGLOBIN SERPL-MCNC: 82 NG/ML (ref 25–58)
NEUTROPHILS NFR BLD: 59 % (ref 36–65)
NEUTROPHILS NFR BLD: 89 % (ref 36–66)
NEUTS SEG NFR BLD: 4.53 K/UL (ref 1.5–8.1)
NEUTS SEG NFR BLD: 5.96 K/UL (ref 1.8–7.7)
NRBC BLD-RTO: 0 PER 100 WBC
NRBC BLD-RTO: 0 PER 100 WBC
PHOSPHATE SERPL-MCNC: 2.2 MG/DL (ref 2.6–4.5)
PHOSPHATE SERPL-MCNC: 2.8 MG/DL (ref 2.6–4.5)
PLATELET # BLD AUTO: 173 K/UL (ref 138–453)
PLATELET # BLD AUTO: 183 K/UL (ref 138–453)
PMV BLD AUTO: 11.7 FL (ref 8.1–13.5)
PMV BLD AUTO: 12 FL (ref 8.1–13.5)
POTASSIUM SERPL-SCNC: 3.6 MMOL/L (ref 3.7–5.3)
POTASSIUM SERPL-SCNC: 4.9 MMOL/L (ref 3.7–5.3)
POTASSIUM SERPL-SCNC: 5 MMOL/L (ref 3.7–5.3)
PROCALCITONIN SERPL-MCNC: 0.07 NG/ML
PROT SERPL-MCNC: 6.3 G/DL (ref 6.4–8.3)
RBC # BLD AUTO: 4.17 M/UL (ref 3.95–5.11)
RBC # BLD AUTO: 4.34 M/UL (ref 3.95–5.11)
SODIUM SERPL-SCNC: 131 MMOL/L (ref 135–144)
SODIUM SERPL-SCNC: 133 MMOL/L (ref 135–144)
SODIUM SERPL-SCNC: 137 MMOL/L (ref 135–144)
TROPONIN I SERPL HS-MCNC: 14 NG/L (ref 0–14)
TROPONIN I SERPL HS-MCNC: 9 NG/L (ref 0–14)
WBC OTHER # BLD: 6.7 K/UL (ref 3.5–11.3)
WBC OTHER # BLD: 7.7 K/UL (ref 3.5–11.3)

## 2023-09-06 PROCEDURE — 83036 HEMOGLOBIN GLYCOSYLATED A1C: CPT

## 2023-09-06 PROCEDURE — 94640 AIRWAY INHALATION TREATMENT: CPT

## 2023-09-06 PROCEDURE — 97530 THERAPEUTIC ACTIVITIES: CPT

## 2023-09-06 PROCEDURE — 93010 ELECTROCARDIOGRAM REPORT: CPT | Performed by: INTERNAL MEDICINE

## 2023-09-06 PROCEDURE — 94761 N-INVAS EAR/PLS OXIMETRY MLT: CPT

## 2023-09-06 PROCEDURE — 99223 1ST HOSP IP/OBS HIGH 75: CPT | Performed by: INTERNAL MEDICINE

## 2023-09-06 PROCEDURE — 1200000000 HC SEMI PRIVATE

## 2023-09-06 PROCEDURE — 6370000000 HC RX 637 (ALT 250 FOR IP): Performed by: NURSE PRACTITIONER

## 2023-09-06 PROCEDURE — 97162 PT EVAL MOD COMPLEX 30 MIN: CPT

## 2023-09-06 PROCEDURE — 82248 BILIRUBIN DIRECT: CPT

## 2023-09-06 PROCEDURE — 2580000003 HC RX 258: Performed by: INTERNAL MEDICINE

## 2023-09-06 PROCEDURE — 71045 X-RAY EXAM CHEST 1 VIEW: CPT

## 2023-09-06 PROCEDURE — 6370000000 HC RX 637 (ALT 250 FOR IP): Performed by: INTERNAL MEDICINE

## 2023-09-06 PROCEDURE — 6360000002 HC RX W HCPCS: Performed by: NURSE PRACTITIONER

## 2023-09-06 PROCEDURE — 82947 ASSAY GLUCOSE BLOOD QUANT: CPT

## 2023-09-06 PROCEDURE — 94760 N-INVAS EAR/PLS OXIMETRY 1: CPT

## 2023-09-06 PROCEDURE — 36415 COLL VENOUS BLD VENIPUNCTURE: CPT

## 2023-09-06 PROCEDURE — 2700000000 HC OXYGEN THERAPY PER DAY

## 2023-09-06 PROCEDURE — 84100 ASSAY OF PHOSPHORUS: CPT

## 2023-09-06 PROCEDURE — 83880 ASSAY OF NATRIURETIC PEPTIDE: CPT

## 2023-09-06 PROCEDURE — 84484 ASSAY OF TROPONIN QUANT: CPT

## 2023-09-06 PROCEDURE — 2580000003 HC RX 258

## 2023-09-06 PROCEDURE — 2580000003 HC RX 258: Performed by: NURSE PRACTITIONER

## 2023-09-06 PROCEDURE — 94660 CPAP INITIATION&MGMT: CPT

## 2023-09-06 PROCEDURE — 80048 BASIC METABOLIC PNL TOTAL CA: CPT

## 2023-09-06 PROCEDURE — 83735 ASSAY OF MAGNESIUM: CPT

## 2023-09-06 PROCEDURE — G0108 DIAB MANAGE TRN  PER INDIV: HCPCS

## 2023-09-06 PROCEDURE — 80053 COMPREHEN METABOLIC PANEL: CPT

## 2023-09-06 PROCEDURE — 85025 COMPLETE CBC W/AUTO DIFF WBC: CPT

## 2023-09-06 PROCEDURE — 83874 ASSAY OF MYOGLOBIN: CPT

## 2023-09-06 PROCEDURE — 84145 PROCALCITONIN (PCT): CPT

## 2023-09-06 RX ORDER — DEXTROSE AND SODIUM CHLORIDE 5; .45 G/100ML; G/100ML
INJECTION, SOLUTION INTRAVENOUS
Status: COMPLETED
Start: 2023-09-06 | End: 2023-09-06

## 2023-09-06 RX ORDER — ATORVASTATIN CALCIUM 40 MG/1
40 TABLET, FILM COATED ORAL NIGHTLY
Status: DISCONTINUED | OUTPATIENT
Start: 2023-09-06 | End: 2023-09-09 | Stop reason: HOSPADM

## 2023-09-06 RX ORDER — NYSTATIN 100000 U/G
CREAM TOPICAL 3 TIMES DAILY
Status: DISCONTINUED | OUTPATIENT
Start: 2023-09-06 | End: 2023-09-09 | Stop reason: HOSPADM

## 2023-09-06 RX ORDER — GUAIFENESIN 600 MG/1
600 TABLET, EXTENDED RELEASE ORAL 2 TIMES DAILY
Status: DISCONTINUED | OUTPATIENT
Start: 2023-09-06 | End: 2023-09-09 | Stop reason: HOSPADM

## 2023-09-06 RX ORDER — IPRATROPIUM BROMIDE AND ALBUTEROL SULFATE 2.5; .5 MG/3ML; MG/3ML
1 SOLUTION RESPIRATORY (INHALATION)
Status: DISCONTINUED | OUTPATIENT
Start: 2023-09-06 | End: 2023-09-09 | Stop reason: HOSPADM

## 2023-09-06 RX ORDER — INSULIN LISPRO 100 [IU]/ML
0-4 INJECTION, SOLUTION INTRAVENOUS; SUBCUTANEOUS NIGHTLY
Status: DISCONTINUED | OUTPATIENT
Start: 2023-09-06 | End: 2023-09-08 | Stop reason: SDUPTHER

## 2023-09-06 RX ORDER — BUDESONIDE AND FORMOTEROL FUMARATE DIHYDRATE 160; 4.5 UG/1; UG/1
2 AEROSOL RESPIRATORY (INHALATION) 2 TIMES DAILY
Status: DISCONTINUED | OUTPATIENT
Start: 2023-09-06 | End: 2023-09-09 | Stop reason: HOSPADM

## 2023-09-06 RX ORDER — OXYCODONE HYDROCHLORIDE AND ACETAMINOPHEN 5; 325 MG/1; MG/1
1 TABLET ORAL EVERY 8 HOURS PRN
Status: DISCONTINUED | OUTPATIENT
Start: 2023-09-06 | End: 2023-09-09 | Stop reason: HOSPADM

## 2023-09-06 RX ORDER — FLUCONAZOLE 100 MG/1
100 TABLET ORAL DAILY
Status: DISCONTINUED | OUTPATIENT
Start: 2023-09-06 | End: 2023-09-07

## 2023-09-06 RX ORDER — MAGNESIUM HYDROXIDE/ALUMINUM HYDROXICE/SIMETHICONE 120; 1200; 1200 MG/30ML; MG/30ML; MG/30ML
30 SUSPENSION ORAL EVERY 6 HOURS PRN
Status: DISCONTINUED | OUTPATIENT
Start: 2023-09-06 | End: 2023-09-09 | Stop reason: HOSPADM

## 2023-09-06 RX ORDER — MAGNESIUM SULFATE HEPTAHYDRATE 40 MG/ML
4000 INJECTION, SOLUTION INTRAVENOUS ONCE
Status: COMPLETED | OUTPATIENT
Start: 2023-09-06 | End: 2023-09-07

## 2023-09-06 RX ORDER — DEXTROSE AND SODIUM CHLORIDE 5; .45 G/100ML; G/100ML
INJECTION, SOLUTION INTRAVENOUS CONTINUOUS PRN
Status: DISCONTINUED | OUTPATIENT
Start: 2023-09-06 | End: 2023-09-09 | Stop reason: HOSPADM

## 2023-09-06 RX ORDER — PREDNISONE 20 MG/1
40 TABLET ORAL DAILY
Status: DISCONTINUED | OUTPATIENT
Start: 2023-09-06 | End: 2023-09-09 | Stop reason: HOSPADM

## 2023-09-06 RX ORDER — POTASSIUM CHLORIDE 7.45 MG/ML
10 INJECTION INTRAVENOUS PRN
Status: DISCONTINUED | OUTPATIENT
Start: 2023-09-06 | End: 2023-09-09 | Stop reason: HOSPADM

## 2023-09-06 RX ORDER — UREA 10 %
3 LOTION (ML) TOPICAL ONCE
Status: COMPLETED | OUTPATIENT
Start: 2023-09-06 | End: 2023-09-06

## 2023-09-06 RX ORDER — SODIUM CHLORIDE 450 MG/100ML
INJECTION, SOLUTION INTRAVENOUS CONTINUOUS
Status: DISCONTINUED | OUTPATIENT
Start: 2023-09-06 | End: 2023-09-06

## 2023-09-06 RX ORDER — MAGNESIUM SULFATE IN WATER 40 MG/ML
2000 INJECTION, SOLUTION INTRAVENOUS PRN
Status: DISCONTINUED | OUTPATIENT
Start: 2023-09-06 | End: 2023-09-09 | Stop reason: HOSPADM

## 2023-09-06 RX ORDER — TOPIRAMATE 100 MG/1
100 TABLET, FILM COATED ORAL NIGHTLY
Status: DISCONTINUED | OUTPATIENT
Start: 2023-09-06 | End: 2023-09-09 | Stop reason: HOSPADM

## 2023-09-06 RX ORDER — INSULIN GLARGINE 100 [IU]/ML
55 INJECTION, SOLUTION SUBCUTANEOUS 2 TIMES DAILY
Status: DISCONTINUED | OUTPATIENT
Start: 2023-09-06 | End: 2023-09-07

## 2023-09-06 RX ORDER — INSULIN LISPRO 100 [IU]/ML
0-16 INJECTION, SOLUTION INTRAVENOUS; SUBCUTANEOUS
Status: DISCONTINUED | OUTPATIENT
Start: 2023-09-06 | End: 2023-09-08

## 2023-09-06 RX ORDER — TIZANIDINE 2 MG/1
2 TABLET ORAL EVERY 12 HOURS PRN
Status: DISCONTINUED | OUTPATIENT
Start: 2023-09-06 | End: 2023-09-09 | Stop reason: HOSPADM

## 2023-09-06 RX ADMIN — PREDNISONE 40 MG: 20 TABLET ORAL at 09:33

## 2023-09-06 RX ADMIN — DEXTROSE AND SODIUM CHLORIDE: 5; 450 INJECTION, SOLUTION INTRAVENOUS at 04:04

## 2023-09-06 RX ADMIN — ESCITALOPRAM 20 MG: 10 TABLET, FILM COATED ORAL at 09:33

## 2023-09-06 RX ADMIN — INSULIN LISPRO 4 UNITS: 100 INJECTION, SOLUTION INTRAVENOUS; SUBCUTANEOUS at 00:46

## 2023-09-06 RX ADMIN — NYSTATIN: 100000 CREAM TOPICAL at 09:34

## 2023-09-06 RX ADMIN — ENOXAPARIN SODIUM 30 MG: 100 INJECTION SUBCUTANEOUS at 21:49

## 2023-09-06 RX ADMIN — PANTOPRAZOLE SODIUM 40 MG: 40 TABLET, DELAYED RELEASE ORAL at 00:48

## 2023-09-06 RX ADMIN — FLUCONAZOLE 100 MG: 100 TABLET ORAL at 09:33

## 2023-09-06 RX ADMIN — OXYCODONE HYDROCHLORIDE AND ACETAMINOPHEN 1 TABLET: 5; 325 TABLET ORAL at 20:39

## 2023-09-06 RX ADMIN — NYSTATIN: 100000 CREAM TOPICAL at 21:49

## 2023-09-06 RX ADMIN — CARVEDILOL 3.12 MG: 3.12 TABLET, FILM COATED ORAL at 00:48

## 2023-09-06 RX ADMIN — Medication 3 MG: at 23:32

## 2023-09-06 RX ADMIN — SODIUM CHLORIDE, PRESERVATIVE FREE 10 ML: 5 INJECTION INTRAVENOUS at 13:09

## 2023-09-06 RX ADMIN — GUAIFENESIN 600 MG: 600 TABLET, EXTENDED RELEASE ORAL at 09:33

## 2023-09-06 RX ADMIN — DOCUSATE SODIUM 100 MG: 100 CAPSULE, LIQUID FILLED ORAL at 21:49

## 2023-09-06 RX ADMIN — SODIUM CHLORIDE: 9 INJECTION, SOLUTION INTRAVENOUS at 00:58

## 2023-09-06 RX ADMIN — CARVEDILOL 3.12 MG: 3.12 TABLET, FILM COATED ORAL at 09:33

## 2023-09-06 RX ADMIN — DOCUSATE SODIUM 100 MG: 100 CAPSULE, LIQUID FILLED ORAL at 00:48

## 2023-09-06 RX ADMIN — GUAIFENESIN 600 MG: 600 TABLET, EXTENDED RELEASE ORAL at 21:49

## 2023-09-06 RX ADMIN — INSULIN LISPRO 12 UNITS: 100 INJECTION, SOLUTION INTRAVENOUS; SUBCUTANEOUS at 13:07

## 2023-09-06 RX ADMIN — LOSARTAN POTASSIUM 25 MG: 50 TABLET, FILM COATED ORAL at 13:12

## 2023-09-06 RX ADMIN — ENOXAPARIN SODIUM 30 MG: 100 INJECTION SUBCUTANEOUS at 09:37

## 2023-09-06 RX ADMIN — INSULIN GLARGINE 55 UNITS: 100 INJECTION, SOLUTION SUBCUTANEOUS at 21:49

## 2023-09-06 RX ADMIN — SODIUM CHLORIDE 2.28 UNITS/HR: 9 INJECTION, SOLUTION INTRAVENOUS at 02:50

## 2023-09-06 RX ADMIN — IPRATROPIUM BROMIDE AND ALBUTEROL SULFATE 1 DOSE: 2.5; .5 SOLUTION RESPIRATORY (INHALATION) at 07:49

## 2023-09-06 RX ADMIN — DOCUSATE SODIUM 100 MG: 100 CAPSULE, LIQUID FILLED ORAL at 09:33

## 2023-09-06 RX ADMIN — SODIUM CHLORIDE, PRESERVATIVE FREE 10 ML: 5 INJECTION INTRAVENOUS at 00:49

## 2023-09-06 RX ADMIN — DESMOPRESSIN ACETATE 40 MG: 0.2 TABLET ORAL at 23:33

## 2023-09-06 RX ADMIN — INSULIN LISPRO 8 UNITS: 100 INJECTION, SOLUTION INTRAVENOUS; SUBCUTANEOUS at 17:36

## 2023-09-06 RX ADMIN — OXYBUTYNIN CHLORIDE 5 MG: 5 TABLET, EXTENDED RELEASE ORAL at 09:33

## 2023-09-06 RX ADMIN — DEXTROSE AND SODIUM CHLORIDE: 5; .45 INJECTION, SOLUTION INTRAVENOUS at 04:04

## 2023-09-06 RX ADMIN — INSULIN GLARGINE 55 UNITS: 100 INJECTION, SOLUTION SUBCUTANEOUS at 00:40

## 2023-09-06 RX ADMIN — INSULIN LISPRO 4 UNITS: 100 INJECTION, SOLUTION INTRAVENOUS; SUBCUTANEOUS at 21:50

## 2023-09-06 RX ADMIN — SODIUM CHLORIDE, PRESERVATIVE FREE 10 ML: 5 INJECTION INTRAVENOUS at 21:50

## 2023-09-06 RX ADMIN — INSULIN GLARGINE 55 UNITS: 100 INJECTION, SOLUTION SUBCUTANEOUS at 09:36

## 2023-09-06 RX ADMIN — TOPIRAMATE 100 MG: 100 TABLET, FILM COATED ORAL at 23:33

## 2023-09-06 RX ADMIN — PANTOPRAZOLE SODIUM 40 MG: 40 TABLET, DELAYED RELEASE ORAL at 09:33

## 2023-09-06 RX ADMIN — IPRATROPIUM BROMIDE AND ALBUTEROL SULFATE 1 DOSE: 2.5; .5 SOLUTION RESPIRATORY (INHALATION) at 22:15

## 2023-09-06 RX ADMIN — SODIUM CHLORIDE 1.55 UNITS/HR: 9 INJECTION, SOLUTION INTRAVENOUS at 04:06

## 2023-09-06 RX ADMIN — BUDESONIDE AND FORMOTEROL FUMARATE DIHYDRATE 2 PUFF: 160; 4.5 AEROSOL RESPIRATORY (INHALATION) at 22:20

## 2023-09-06 RX ADMIN — ISOSORBIDE DINITRATE 30 MG: 10 TABLET ORAL at 09:33

## 2023-09-06 RX ADMIN — ASPIRIN 81 MG: 81 TABLET, COATED ORAL at 09:33

## 2023-09-06 RX ADMIN — ENOXAPARIN SODIUM 30 MG: 100 INJECTION SUBCUTANEOUS at 00:48

## 2023-09-06 RX ADMIN — CARVEDILOL 3.12 MG: 3.12 TABLET, FILM COATED ORAL at 22:10

## 2023-09-06 ASSESSMENT — PAIN SCALES - GENERAL
PAINLEVEL_OUTOF10: 9
PAINLEVEL_OUTOF10: 6

## 2023-09-06 NOTE — CARE COORDINATION
Case Management Assessment  Initial Evaluation    Date/Time of Evaluation: 9/6/2023 11:51 AM  Assessment Completed by: Jean Marcano RN    If patient is discharged prior to next notation, then this note serves as note for discharge by case management. Patient Name: Huyen Gunderson                   YOB: 1950  Diagnosis: Chest pressure [R07.89]  Hyperglycemia [R73.9]  COPD exacerbation ([address]) [J44.1]                   Date / Time: 9/5/2023  2:18 PM    Patient Admission Status: Inpatient   Readmission Risk (Low < 19, Mod (19-27), High > 27): Readmission Risk Score: 15.5    Current PCP: VERONIKA Wyatt CNP  PCP verified by CM? Yes    Chart Reviewed: Yes      History Provided by: Patient  Patient Orientation: Alert and Oriented    Patient Cognition: Alert    Hospitalization in the last 30 days (Readmission):  No    If yes, Readmission Assessment in CM Navigator will be completed. Advance Directives:      Code Status: Full Code   Patient's Primary Decision Maker is:      Primary Decision Maker (Active): David Waller - Other Relative - [phone]    Discharge Planning:    Patient lives with: Alone Type of Home: Apartment  Primary Care Giver: Self  Patient Support Systems include: Family Members   Current Financial resources:    Current community resources:    Current services prior to admission: Durable Medical Equipment, Oxygen Therapy            [facility], Oxygen Therapy (Comment), Shower Chair, Walker            Type of Home Care services:  None    ADLS  Prior functional level: Independent in ADLs/IADLs  Current functional level: Independent in ADLs/IADLs    PT AM-PAC:   /24  OT AM-PAC:   /24    Family can provide assistance at DC: Would you like Case Management to discuss the discharge plan with any other family members/significant others, and if so, who?     Plans to Return to Present Housing: Yes  Other Identified Issues/Barriers to

## 2023-09-06 NOTE — PROGRESS NOTES
Physical Therapy  Facility/Department: [facility] OBSERVATION UNIT  Physical Therapy Initial Assessment    Name: Uzma Kearney  : 1950  MRN: [id number]  Date of Service: 2023    Discharge Recommendations:  Patient would benefit from continued therapy after discharge          Patient Diagnosis(es): The primary encounter diagnosis was COPD exacerbation ([address] Diagnoses of Chest pressure and Hyperglycemia were also pertinent to this visit. Past Medical History:  has a past medical history of Abdominal bloating, Adenomatous polyp of ascending colon, Allergic rhinitis, Anxiety, Arthritis, Asthma, Atrial fibrillation ([address]), Back pain, Benign hypertension with CKD (chronic kidney disease) stage III ([address]), CAD (coronary artery disease), Caffeine use, CHF (congestive heart failure) ([address]), Chronic kidney disease, CKD (chronic kidney disease) stage 3, GFR 30-59 ml/min ([facility]), COPD (chronic obstructive pulmonary disease) ([address]), Degeneration of lumbar or lumbosacral intervertebral disc, Depression, DM (diabetes mellitus) ([address]), Emphysema of lung ([address]), Gastritis, GERD (gastroesophageal reflux disease), Hearing loss, Hematuria, Hiatal hernia, HTN (hypertension), benign, Hypertriglyceridemia, Incontinence of urine, Irritable bowel syndrome with both constipation and diarrhea, Knee arthropathy, Lumbosacral spondylosis without myelopathy, Migraine headache, Mumps, Neuropathy, Obesity, On home oxygen therapy, HIGINIO on CPAP, Otitis media, Secondary diabetes mellitus with stage 3 chronic kidney disease (GFR 30-59) ([address]), Stroke ([address]), Tinnitus, Type 2 diabetes mellitus without complication ([address]), Type II or unspecified type diabetes mellitus without mention of complication, not stated as uncontrolled, UTI (lower urinary tract infection), and Varicose vein. Past Surgical History:  has a past surgical history that includes Cholecystectomy (); Carpal tunnel release (Right);  Tympanoplasty; Dilation & curettage (); (2nd fl apt)  Home Access: Elevator, Level entry  Bathroom Shower/Tub: Tub/Shower unit  Bathroom Toilet: Standard  Bathroom Equipment: Grab bars in shower, Tub transfer bench  Home Equipment: [address] bed, Oxygen, Walker, rolling  ADL Assistance: [address] Independent  Homemaking Responsibilities: Yes  Ambulation Assistance: Independent  Transfer Assistance: Independent  Active : No  Patient's  Info: cab  Mode of Transportation: Cab  Occupation: Retired  Leisure & Hobbies: visit cousin, play card games  Vision/Hearing  Vision  Vision: Impaired  Vision Exceptions: Wears glasses at all times  Hearing  Hearing: Exceptions to [facility]  Hearing Exceptions: Bilateral hearing aid    Cognition   Orientation  Overall Orientation Status: Within Functional Limits  Orientation Level: Oriented X4  Cognition  Overall Cognitive Status: WFL     Objective   Pulse: 82  BP: 125/68  MAP (Calculated): 87  Respirations: 18  SpO2: 96 %  O2 Device: Nasal cannula        Gross Assessment  AROM: Within functional limits  PROM: Within functional limits  Strength:  Within functional limits  Coordination: Within functional limits  Tone: Normal  Sensation: Impaired (reports numbness in B hands and feet)     AROM RLE (degrees)  RLE AROM: WFL  AROM LLE (degrees)  LLE AROM : WFL  AROM RUE (degrees)  RUE AROM : WFL  AROM LUE (degrees)  LUE AROM : WFL  Strength RLE  Strength RLE: WFL  Strength LLE  Strength LLE: WFL  Strength RUE  Strength RUE: WFL  Strength LUE  Strength LUE: WFL           Bed mobility  Supine to Sit: Contact guard assistance  Sit to Supine: Contact guard assistance  Scooting: Contact guard assistance  Transfers  Sit to Stand: Contact guard assistance  Stand to Sit: Contact guard assistance  Ambulation  Surface: Level tile  Device: Rolling Walker  Assistance: Contact guard assistance  Distance: amb 10 ft with a RW x CGA  Comments: Pt unsteady, SOB with a minimal increase in

## 2023-09-06 NOTE — PROGRESS NOTES
[address]  Speech Language Pathology    Date: 9/6/2023  Patient Name: Chester Howell  YOB: 1950   AGE: 67 y.o. MRN: [id number]        Patient Not Available for Speech Therapy     Due to:  [] Testing  [] Hemodialysis  [] Cancelled by RN  [] Surgery   [] Intubation/Sedation/Pain Medication  [] Medical instability  [x] Other: Spoke with RN, pt. On regular diet with thin liquid and tolerating without difficulty. No need for bedside swallow study at this time. Next scheduled treatment: re-consult if necessary  Completed by: Bisi Suh, SLP, M.A.  CCC-SLP

## 2023-09-06 NOTE — ED NOTES
Pt resting on stretcher. A&Ox4. RR even and unlabored. No distress noted. Pt denies any needs at this time. Call light within reach.         Jaydon Marques RN  09/06/23 5277

## 2023-09-06 NOTE — H&P
Morbid obesity with BMI of 40.0-44.9, adult ([address]) 9/6/2023 Yes       Plan:     Patient status inpatient in the Progressive Unit/Step down    Acute on chronic hypoxic/hypercapnic respiratory failure with COPD exacerbation/chronic HIGINIO. Possible component of CHF with chronic diastolic heart failure. Continue pulmonary hygiene, bronchodilators, steroids. Resume CPAP per home regimen. Continue to monitor for fluid overload. Hyperosmolar hyperglycemia state with type 2 diabetes-patient with persistent hyperglycemia. Her A1c's consistently 12-14 past year now with acute hyperglycemia likely multifactorial with acute infection. Discussed with patient importance of blood sugar control to reduce risk of microvascular and macrovascular complications associated with diabetes. Intertriginous candidiasis with vaginitis-likely related to blood sugar control. Encourage blood sugar control. Start Diflucan/nystatin cream.  Continue keep area dry clean and intact  Atypical chest pain with dyspnea on exertion/near syncope with activity. Likely multifactorial with COPD/CHF/HHS with severe hyperglycemia/dehydration contributing to symptoms. continue cardiac regimen. Status post recent stress test in July negative for inducible ischemia. Consult cardiology to further evaluate. Continue medical optimization as above  Chronic lumbago/osteoarthritis with chronic weakness and debility. pending therapy evaluation to evaluate discharge needs. Continue supportive care  Chronic nausea with epigastric abdominal pain. Suspect component of gastroparesis. Continue GI prophylaxis. As needed Maalox. Consider gastric emptying study if persistent symptoms  PA-fib. Continue rate control. Not currently on anticoagulation, clarify with cardiology. Remains in sinus mechanism for now. Continue telemetry monitoring  Chronic headaches likely worsened by acute illness.   Continue supportive therapy  Dysuria with chronic urge incontinence likely antagonized by hyperglycemia. Check bladder scan to evaluate retention. Consider Dempsey if appropriate. Continue pure wick for now. UA unremarkable for infection  Acute on chronic vision changes. Will need outpatient ophthalmology evaluation discussed with patient. Likely worsened by acute hyperglycemia  Acute metabolic encephalopathy likely multifactorial with multiple metabolic derangements. Medically optimized with consideration for further work-up if appropriate    Importance of diabetes control discussed with patient with concern for prolonged uncontrolled diabetes, high risk for developing microvascular and macrovascular complications discussed with patient, risk of renal failure requiring dialysis, PAD with amputation, diabetic foot ulcers, diabetic retinopathy with blindness discussed. Patient verbalized understanding. Discussed with ED nursing. Anticipate likely discharge in 3 to 4 days contingent on clinical progress    Prior records reviewed independently by myself. Discussed with ED nursing status post initiation of insulin drip, subsequently weaned off overnight with improved blood sugar control. Continued on Lantus    Consultations:   IP CONSULT TO HOSPITALIST  IP CONSULT TO DIABETES EDUCATOR  IP CONSULT TO CARDIOLOGY     Patient is admitted as inpatient status because of co-morbidities listed above, severity of signs and symptoms as outlined, requirement for current medical therapies and most importantly because of direct risk to patient if care not provided in a hospital setting. Expected length of stay > 48 hours.     Isamar Darnell MD  9/6/2023  2:18 AM    Copy sent to Dr. Ney Leonard, APRN - CNP

## 2023-09-06 NOTE — ED NOTES
The following labs were labeled with appropriate pt sticker and tubed to lab:     [x] Blue     [x] Lavender   [] on ice  [] Green/yellow  [] Green/black [] on ice  [] Thana Hefty  [] on ice  [x] Yellow  [] Red  [] Type/ Screen  [] ABG  [] VBG    [] COVID-19 swab    [] Rapid  [] PCR  [] Flu swab  [] Peds Viral Panel     [] Urine Sample  [] Fecal Sample  [] Pelvic Cultures  [] Blood Cultures  [] X 2  [] STREP Cultures       Isabel Ly RN  09/06/23 9358

## 2023-09-06 NOTE — PROGRESS NOTES
Inpatient Diabetes Education    Minerva Quispe was seen for evaluation and education on Type 2 uncontrolled    Lab Results   Component Value Date    LABA1C 14.0 05/08/2023    LABA1C 14.0 02/06/2023    LABA1C 13.9 10/13/2022     Mariangel reports that she has had diabetes for about 35 years. She denies ever having been to formal Diabetes Education classes. She states that much of her knowledge has come from family members who also have / have had diabetes - her mother, grandmother, many of her aunts and cousins. Jose Vasques states that she has some support - she states that a  comes to her housing complex on M-W-F and she has a contact at the [facility] on [address] [facility]. Jose Vasques states that her glucometer and supplies and her diabetes medications are prescribed by her PCP. I noted in Epic that her last appointment was In July of '23. She reports that she checks her BG at home about bid. I gave her a log sheet to use to document her BG and take to her PCP appointments. She reports that her home checks are over 200 and in the 300s. I discussed with her a recent HgbA1c check that was at 14%. She states that she would like to work toward improved BG control. I suspect that Jose Vasques is not consistent with her diabetes self-care. Jose Vasques reports that her home diabetes medications include - metformin (gets pill packs from her pharmacy) and long and rapid acting insulin. She is also describes what sounds like a weekly GLP1 injection. Her breakfast tray arrived and I used this as a teaching opportunity to discuss healthy eating and diabetes.     Verbally reviewed following information with patient  Survival skills Diagnosis, A1C, Blood glucose targets, hypo and hyperglycemia, importance of home blood glucose monitoring, heathy eating  plate method for CHO control portions, be active as recommended by health care providers, take medications oral and or insulin as directed      Education Folder provided with following support information was left at bedside:   _x__  Handout - What is diabetes? cornerstones4 care 2019  _x__  Handout - Eating Healthy for Life at every Meal / Plate method and grocery list  from www. Alexia Lindsay  _x__ Handout - How to Check Your Blood Sugar from www. Alexia Lindsay  _x__ Handout - Be safe with Needles teaching sheet - / wwwFaith Lindsay    _lena__ Handout - How to Use an Insulin Pen - handout with QR code - Health wise Current as of Sept 22 2021  _x__ Emergency Insert sheet for your Vehicle - ADA Diabetes Emergencies   _x__ Diabetes ID card - ADA Low and High Blood Sugar and treatments  _x__ [address] [facility] Diabetes Education brochure/ contact card    Patient verbalizes understanding  of survival skills education but could benefit from reinforcement. RECOMMENDATIONS FOR OUTPATIENT PLAN:    Diabetes Education / HCP follow -up:   ___ Would recommend follow -up education at outpatient diabetes education at [address]. An ordered is needed for this service and can be placed via EHR. Discharge Navigator --- Med Reconciliation -- New orders for discharge tab -- search diabetic ed - REF20 -  [facility]  - review and sign - I sent an inbasket message to PCP asking for a referral    _X__ Follow -up with HCP / PCP within one week.     ALLEN NUNEZ RN

## 2023-09-06 NOTE — ED NOTES
NP notified via Perfect serve of pt's request for the hospital's bipap and clarification of inpatient fluid orders. Orders received.      Mary Lopez RN  09/06/23 [id number]

## 2023-09-06 NOTE — ED NOTES
ED to inpatient nurses report    Chief Complaint   Patient presents with    Shortness of Breath      Present to ED from home  LOC: alert and orientated to name, place, date  Vital signs   Vitals:    09/06/23 0515 09/06/23 0545 09/06/23 0621 09/06/23 0630   BP: 112/79 105/63 (!) 124/57 117/66   Pulse: 72 72 73 76   Resp: 19 17 21 17   Temp:       TempSrc:       SpO2: 94% 96% 92% 92%   Weight:       Height:          Oxygen Baseline home CPAP and PRN O2    Current needs required 3L O2 NC or bipap while asleep  LDAs:   Peripheral IV 09/05/23 Left Antecubital (Active)   Site Assessment Clean, dry & intact 09/06/23 0200   Line Status Blood return noted; Infusing 09/06/23 0200   Line Care Connections checked and tightened 09/06/23 0200   Phlebitis Assessment No symptoms 09/06/23 0200   Infiltration Assessment 0 09/06/23 0200   Dressing Status New dressing applied 09/06/23 0200   Dressing Type Transparent 09/06/23 0200       Peripheral IV 09/06/23 Right; Anterior Forearm (Active)   Site Assessment Clean, dry & intact 09/06/23 0431   Line Status Brisk blood return;Normal saline locked; Flushed;Specimen collected 09/06/23 0431   Phlebitis Assessment No symptoms 09/06/23 0431   Infiltration Assessment 0 09/06/23 0431   Dressing Type Transparent 09/06/23 0431   Dressing Intervention New 09/06/23 0431     Mobility: Requires assistance * 1  Pending ED orders: None  Present condition: Stable  Code Status: Full  Consults:  [x]  Hospitalist  Completed  [x] yes [] no  []  Medicine  Completed  [] yes [] No  []  Cardiology  Completed  [] yes [] No  []  GI   Completed  [] yes [] No  []  Neurology  Completed  [] yes [] No  []  Nephrology Completed  [] yes [] No  []  Vascular  Completed  [] yes [] No   []  Surgery  Completed  [] yes [] No   []  Urology  Completed  [] yes [] No   []  Plastics  Completed  [] yes [] No   []  ENT  Completed  [] yes [] No   []  Other   Completed  [] yes [] No  Pertinent event(s)   Pertinent event(s) This is a 67

## 2023-09-06 NOTE — ED NOTES
Dr. Royce Shukla notified of pt's FSBS. Physician updated orders.      Home Hampton RN  09/06/23 [id number]

## 2023-09-06 NOTE — PROGRESS NOTES
Placed patient on hospital owned NIV because she is unable to bring her machine from home. Patient thinks she is on a CPAP of 10. Placed on CPAP 10 and patient says it feels right.

## 2023-09-06 NOTE — PROGRESS NOTES
--    ALT 26  --   --   --   --   --   --   --    ALKPHOS 88  --   --   --   --   --   --   --    BILITOT 0.4  --   --   --   --   --   --   --    POCGLU  --    < > 288* 215* 188* 146* 142* 252*    < > = values in this interval not displayed. ABG:  Lab Results   Component Value Date/Time    POCPH 7.239 10/06/2021 02:02 PM    PH 7.346 11/21/2011 08:35 AM    POCPCO2 77.6 10/06/2021 02:02 PM    PCO2 49.8 11/21/2011 08:35 AM    POCPO2 137.2 10/06/2021 02:02 PM    PO2ART 61.3 11/21/2011 08:35 AM    POCHCO3 33.1 10/06/2021 02:02 PM    LMJ2ALK 26.5 11/21/2011 08:35 AM    NBEA NOT REPORTED 10/06/2021 02:02 PM    PBEA 3 10/06/2021 02:02 PM    ZNA0FOT NOT REPORTED 10/06/2021 02:02 PM    SOOX1AHB 98 10/06/2021 02:02 PM    B9ZDXYBM 89.7 11/21/2011 08:35 AM    FIO2 INFORMATION NOT PROVIDED 09/05/2023 10:42 PM     Lab Results   Component Value Date/Time    SPECIAL L AC 09/05/2023 05:13 PM     Lab Results   Component Value Date/Time    CULTURE NO GROWTH 12 HOURS 09/05/2023 05:13 PM       Radiology:  XR CHEST PORTABLE    Result Date: 9/6/2023  No acute process. XR CHEST PORTABLE    Result Date: 9/5/2023  Stable appearance of the chest with no acute findings. Physical Examination:        General appearance:  alert, cooperative and no distress  Mental Status:  oriented to person, place and time and normal affect  Lungs:  exp wheezes, reduced inspirtory reserve, 4-5 word sentences, no rhonchi, intermittent coughs  Heart:  regular rate and rhythm, no murmur  Abdomen:  soft, nontender, nondistended, normal bowel sounds,   Extremities:  trace  edema, redness, tenderness in the calves  Skin:  no gross lesions, rashes, induration    Assessment:      #COPD exacerbation  -Shes in no distresss at rest but little activity shes winded. She is fluid overloaded due to noncompliance of lasix but her lungs are clear. She is complient with AC thus  PE less likley.    -denies infectious symptoms  -She has chronic a fib but rate controlled. -steriods, standing inhalers, Abx for antinflammation    #Chest pain   -MSK in oriigin from coughing. Cough suppresent. Muclytic.   -no signs of ischemia.   -Cardio on board. #Hyperglycemia with DM2  -given d5 fluids, glucose uncontrolled resume insulin, goal 140-180.   -latus 55 BID. Finger sticks. Stop D5.      VTE ppx    Hospital Problems             Last Modified POA    * (Principal) COPD exacerbation ([address]) 9/5/2023 Yes    Chronic diastolic congestive heart failure ([address]) 9/6/2023 Yes    Chronic bilateral low back pain with bilateral sciatica (Chronic) 9/6/2023 Yes    GERD (gastroesophageal reflux disease) (Chronic) 9/6/2023 Yes    Chronic nausea 9/6/2023 Yes    Neuropathy 9/6/2023 Yes    Obstructive sleep apnea syndrome (Chronic) 9/6/2023 Yes    Asthma with COPD ([address]) 9/6/2023 Yes    CKD (chronic kidney disease) stage 3, GFR 30-59 ml/min 9/6/2023 Yes    Permanent atrial fibrillation ([address]) 9/6/2023 Yes    Atypical chest pain 9/6/2023 Yes    Acute respiratory failure with hypoxia and hypercarbia ([address]) 9/6/2023 Yes    Morbid obesity with BMI of 40.0-44.9, adult ([address]) 9/6/2023 Yes    Type 2 diabetes mellitus with hyperosmolar nonketotic hyperglycemia ([address]) 9/6/2023 Yes   #      #Acute

## 2023-09-07 ENCOUNTER — APPOINTMENT (OUTPATIENT)
Age: 73
End: 2023-09-07
Attending: INTERNAL MEDICINE
Payer: MEDICARE

## 2023-09-07 PROBLEM — T82.330A: Status: ACTIVE | Noted: 2023-09-07

## 2023-09-07 PROBLEM — E11.65 UNCONTROLLED TYPE 2 DIABETES MELLITUS WITH HYPERGLYCEMIA (HCC): Status: ACTIVE | Noted: 2023-09-07

## 2023-09-07 LAB
ANION GAP SERPL CALCULATED.3IONS-SCNC: 11 MMOL/L (ref 9–17)
ANION GAP SERPL CALCULATED.3IONS-SCNC: 8 MMOL/L (ref 9–17)
ANION GAP SERPL CALCULATED.3IONS-SCNC: 9 MMOL/L (ref 9–17)
BUN SERPL-MCNC: 16 MG/DL (ref 8–23)
BUN SERPL-MCNC: 18 MG/DL (ref 8–23)
BUN SERPL-MCNC: 19 MG/DL (ref 8–23)
CALCIUM SERPL-MCNC: 8.5 MG/DL (ref 8.6–10.4)
CALCIUM SERPL-MCNC: 8.7 MG/DL (ref 8.6–10.4)
CALCIUM SERPL-MCNC: 8.9 MG/DL (ref 8.6–10.4)
CHLORIDE SERPL-SCNC: 96 MMOL/L (ref 98–107)
CHLORIDE SERPL-SCNC: 98 MMOL/L (ref 98–107)
CHLORIDE SERPL-SCNC: 99 MMOL/L (ref 98–107)
CO2 SERPL-SCNC: 23 MMOL/L (ref 20–31)
CO2 SERPL-SCNC: 26 MMOL/L (ref 20–31)
CO2 SERPL-SCNC: 28 MMOL/L (ref 20–31)
CREAT SERPL-MCNC: 0.9 MG/DL (ref 0.5–0.9)
CREAT SERPL-MCNC: 0.9 MG/DL (ref 0.5–0.9)
CREAT SERPL-MCNC: 1 MG/DL (ref 0.5–0.9)
ECHO AO ROOT DIAM: 3.3 CM
ECHO AO ROOT INDEX: 1.6 CM/M2
ECHO AV AREA PEAK VELOCITY: 2.5 CM2
ECHO AV AREA VTI: 3.5 CM2
ECHO AV AREA/BSA PEAK VELOCITY: 1.2 CM2/M2
ECHO AV AREA/BSA VTI: 1.7 CM2/M2
ECHO AV MEAN GRADIENT: 2 MMHG
ECHO AV MEAN VELOCITY: 0.7 M/S
ECHO AV PEAK GRADIENT: 4 MMHG
ECHO AV PEAK VELOCITY: 1 M/S
ECHO AV VELOCITY RATIO: 0.8
ECHO AV VTI: 22.2 CM
ECHO BSA: 2.17 M2
ECHO IVC PROX: 2.2 CM
ECHO LA AREA 2C: 14.9 CM2
ECHO LA AREA 4C: 14.6 CM2
ECHO LA DIAMETER INDEX: 1.94 CM/M2
ECHO LA DIAMETER: 4 CM
ECHO LA MAJOR AXIS: 5 CM
ECHO LA MINOR AXIS: 4.6 CM
ECHO LA TO AORTIC ROOT RATIO: 1.21
ECHO LA VOL 2C: 39 ML (ref 22–52)
ECHO LA VOL 4C: 33 ML (ref 22–52)
ECHO LA VOL BP: 38 ML (ref 22–52)
ECHO LA VOL/BSA BIPLANE: 18 ML/M2 (ref 16–34)
ECHO LA VOLUME INDEX A2C: 19 ML/M2 (ref 16–34)
ECHO LA VOLUME INDEX A4C: 16 ML/M2 (ref 16–34)
ECHO LV E' LATERAL VELOCITY: 8 CM/S
ECHO LV E' SEPTAL VELOCITY: 8 CM/S
ECHO LV EDV A2C: 52 ML
ECHO LV EDV A4C: 59 ML
ECHO LV EDV INDEX A4C: 29 ML/M2
ECHO LV EDV NDEX A2C: 25 ML/M2
ECHO LV EJECTION FRACTION A2C: 60 %
ECHO LV EJECTION FRACTION A4C: 60 %
ECHO LV EJECTION FRACTION BIPLANE: 60 % (ref 55–100)
ECHO LV ESV A2C: 21 ML
ECHO LV ESV A4C: 24 ML
ECHO LV ESV INDEX A2C: 10 ML/M2
ECHO LV ESV INDEX A4C: 12 ML/M2
ECHO LV INTERNAL DIMENSION DIASTOLE INDEX: 2.52 CM/M2
ECHO LV INTERNAL DIMENSION DIASTOLIC: 5.2 CM (ref 3.9–5.3)
ECHO LV IVSD: 0.9 CM (ref 0.6–0.9)
ECHO LV MASS 2D: 181.4 G (ref 67–162)
ECHO LV MASS INDEX 2D: 88.1 G/M2 (ref 43–95)
ECHO LV POSTERIOR WALL DIASTOLIC: 1 CM (ref 0.6–0.9)
ECHO LV RELATIVE WALL THICKNESS RATIO: 0.38
ECHO LVOT AREA: 3.1 CM2
ECHO LVOT AV VTI INDEX: 1.1
ECHO LVOT DIAM: 2 CM
ECHO LVOT MEAN GRADIENT: 1 MMHG
ECHO LVOT PEAK GRADIENT: 3 MMHG
ECHO LVOT PEAK VELOCITY: 0.8 M/S
ECHO LVOT STROKE VOLUME INDEX: 37.2 ML/M2
ECHO LVOT SV: 76.6 ML
ECHO LVOT VTI: 24.4 CM
ECHO MV A VELOCITY: 0.6 M/S
ECHO MV AREA VTI: 2.2 CM2
ECHO MV E DECELERATION TIME (DT): 181 MS
ECHO MV E VELOCITY: 0.85 M/S
ECHO MV E/A RATIO: 1.42
ECHO MV E/E' LATERAL: 10.63
ECHO MV E/E' RATIO (AVERAGED): 10.63
ECHO MV E/E' SEPTAL: 10.63
ECHO MV LVOT VTI INDEX: 1.45
ECHO MV MAX VELOCITY: 1.1 M/S
ECHO MV MEAN GRADIENT: 2 MMHG
ECHO MV MEAN VELOCITY: 0.6 M/S
ECHO MV PEAK GRADIENT: 4 MMHG
ECHO MV VTI: 35.4 CM
ECHO PV MAX VELOCITY: 1 M/S
ECHO PV PEAK GRADIENT: 4 MMHG
ECHO RV BASAL DIMENSION: 2.8 CM
ECHO RV INTERNAL DIMENSION: 2.4 CM
ECHO RV MID DIMENSION: 2.3 CM
ECHO RV TAPSE: 1.9 CM (ref 1.7–?)
ECHO TV REGURGITANT MAX VELOCITY: 2.06 M/S
ECHO TV REGURGITANT PEAK GRADIENT: 17 MMHG
GFR SERPL CREATININE-BSD FRML MDRD: 59 ML/MIN/1.73M2
GFR SERPL CREATININE-BSD FRML MDRD: >60 ML/MIN/1.73M2
GFR SERPL CREATININE-BSD FRML MDRD: >60 ML/MIN/1.73M2
GLUCOSE BLD-MCNC: 201 MG/DL (ref 65–105)
GLUCOSE BLD-MCNC: 280 MG/DL (ref 65–105)
GLUCOSE BLD-MCNC: 315 MG/DL (ref 65–105)
GLUCOSE BLD-MCNC: 318 MG/DL (ref 65–105)
GLUCOSE SERPL-MCNC: 280 MG/DL (ref 70–99)
GLUCOSE SERPL-MCNC: 291 MG/DL (ref 70–99)
GLUCOSE SERPL-MCNC: 330 MG/DL (ref 70–99)
MYOGLOBIN SERPL-MCNC: 33 NG/ML (ref 25–58)
PHOSPHATE SERPL-MCNC: 2 MG/DL (ref 2.6–4.5)
POTASSIUM SERPL-SCNC: 4.4 MMOL/L (ref 3.7–5.3)
POTASSIUM SERPL-SCNC: 4.9 MMOL/L (ref 3.7–5.3)
POTASSIUM SERPL-SCNC: 4.9 MMOL/L (ref 3.7–5.3)
REASON FOR REJECTION: NORMAL
SODIUM SERPL-SCNC: 132 MMOL/L (ref 135–144)
SODIUM SERPL-SCNC: 133 MMOL/L (ref 135–144)
SODIUM SERPL-SCNC: 133 MMOL/L (ref 135–144)
SPECIMEN SOURCE: NORMAL
TROPONIN I SERPL HS-MCNC: 10 NG/L (ref 0–14)
ZZ NTE CLEAN UP: ORDERED TEST: NORMAL

## 2023-09-07 PROCEDURE — 6370000000 HC RX 637 (ALT 250 FOR IP): Performed by: INTERNAL MEDICINE

## 2023-09-07 PROCEDURE — 97530 THERAPEUTIC ACTIVITIES: CPT

## 2023-09-07 PROCEDURE — 93306 TTE W/DOPPLER COMPLETE: CPT | Performed by: INTERNAL MEDICINE

## 2023-09-07 PROCEDURE — 6360000002 HC RX W HCPCS: Performed by: STUDENT IN AN ORGANIZED HEALTH CARE EDUCATION/TRAINING PROGRAM

## 2023-09-07 PROCEDURE — 6370000000 HC RX 637 (ALT 250 FOR IP): Performed by: NURSE PRACTITIONER

## 2023-09-07 PROCEDURE — 84484 ASSAY OF TROPONIN QUANT: CPT

## 2023-09-07 PROCEDURE — 94660 CPAP INITIATION&MGMT: CPT

## 2023-09-07 PROCEDURE — 99232 SBSQ HOSP IP/OBS MODERATE 35: CPT | Performed by: STUDENT IN AN ORGANIZED HEALTH CARE EDUCATION/TRAINING PROGRAM

## 2023-09-07 PROCEDURE — 99233 SBSQ HOSP IP/OBS HIGH 50: CPT | Performed by: NURSE PRACTITIONER

## 2023-09-07 PROCEDURE — 2580000003 HC RX 258: Performed by: STUDENT IN AN ORGANIZED HEALTH CARE EDUCATION/TRAINING PROGRAM

## 2023-09-07 PROCEDURE — 97166 OT EVAL MOD COMPLEX 45 MIN: CPT

## 2023-09-07 PROCEDURE — 6370000000 HC RX 637 (ALT 250 FOR IP): Performed by: STUDENT IN AN ORGANIZED HEALTH CARE EDUCATION/TRAINING PROGRAM

## 2023-09-07 PROCEDURE — 2580000003 HC RX 258: Performed by: NURSE PRACTITIONER

## 2023-09-07 PROCEDURE — 84100 ASSAY OF PHOSPHORUS: CPT

## 2023-09-07 PROCEDURE — 1200000000 HC SEMI PRIVATE

## 2023-09-07 PROCEDURE — 2500000003 HC RX 250 WO HCPCS: Performed by: INTERNAL MEDICINE

## 2023-09-07 PROCEDURE — 6360000002 HC RX W HCPCS: Performed by: NURSE PRACTITIONER

## 2023-09-07 PROCEDURE — 80048 BASIC METABOLIC PNL TOTAL CA: CPT

## 2023-09-07 PROCEDURE — 2700000000 HC OXYGEN THERAPY PER DAY

## 2023-09-07 PROCEDURE — 6360000002 HC RX W HCPCS: Performed by: INTERNAL MEDICINE

## 2023-09-07 PROCEDURE — 83874 ASSAY OF MYOGLOBIN: CPT

## 2023-09-07 PROCEDURE — 82947 ASSAY GLUCOSE BLOOD QUANT: CPT

## 2023-09-07 PROCEDURE — 36415 COLL VENOUS BLD VENIPUNCTURE: CPT

## 2023-09-07 PROCEDURE — 94760 N-INVAS EAR/PLS OXIMETRY 1: CPT

## 2023-09-07 PROCEDURE — 94640 AIRWAY INHALATION TREATMENT: CPT

## 2023-09-07 PROCEDURE — 97535 SELF CARE MNGMENT TRAINING: CPT

## 2023-09-07 PROCEDURE — 93306 TTE W/DOPPLER COMPLETE: CPT

## 2023-09-07 PROCEDURE — 2580000003 HC RX 258: Performed by: INTERNAL MEDICINE

## 2023-09-07 RX ORDER — UREA 10 %
3 LOTION (ML) TOPICAL ONCE
Status: COMPLETED | OUTPATIENT
Start: 2023-09-07 | End: 2023-09-07

## 2023-09-07 RX ORDER — CLOPIDOGREL BISULFATE 75 MG/1
75 TABLET ORAL DAILY
Status: DISCONTINUED | OUTPATIENT
Start: 2023-09-07 | End: 2023-09-09 | Stop reason: HOSPADM

## 2023-09-07 RX ORDER — INSULIN GLARGINE 100 [IU]/ML
60 INJECTION, SOLUTION SUBCUTANEOUS 2 TIMES DAILY
Status: DISCONTINUED | OUTPATIENT
Start: 2023-09-07 | End: 2023-09-08

## 2023-09-07 RX ADMIN — SODIUM PHOSPHATE, MONOBASIC, MONOHYDRATE AND SODIUM PHOSPHATE, DIBASIC, ANHYDROUS 15 MMOL: 276; 142 INJECTION, SOLUTION INTRAVENOUS at 05:12

## 2023-09-07 RX ADMIN — GUAIFENESIN 600 MG: 600 TABLET, EXTENDED RELEASE ORAL at 20:35

## 2023-09-07 RX ADMIN — FENOFIBRATE 160 MG: 160 TABLET ORAL at 09:52

## 2023-09-07 RX ADMIN — ANTI-FUNGAL POWDER MICONAZOLE NITRATE TALC FREE: 1.42 POWDER TOPICAL at 09:55

## 2023-09-07 RX ADMIN — INSULIN GLARGINE 60 UNITS: 100 INJECTION, SOLUTION SUBCUTANEOUS at 20:38

## 2023-09-07 RX ADMIN — ENOXAPARIN SODIUM 30 MG: 100 INJECTION SUBCUTANEOUS at 09:53

## 2023-09-07 RX ADMIN — DOCUSATE SODIUM 100 MG: 100 CAPSULE, LIQUID FILLED ORAL at 09:52

## 2023-09-07 RX ADMIN — CARVEDILOL 3.12 MG: 3.12 TABLET, FILM COATED ORAL at 09:58

## 2023-09-07 RX ADMIN — METFORMIN HYDROCHLORIDE 1000 MG: 500 TABLET ORAL at 17:33

## 2023-09-07 RX ADMIN — ASPIRIN 81 MG: 81 TABLET, COATED ORAL at 09:53

## 2023-09-07 RX ADMIN — NYSTATIN: 100000 CREAM TOPICAL at 09:56

## 2023-09-07 RX ADMIN — BUDESONIDE AND FORMOTEROL FUMARATE DIHYDRATE 2 PUFF: 160; 4.5 AEROSOL RESPIRATORY (INHALATION) at 20:11

## 2023-09-07 RX ADMIN — SODIUM CHLORIDE, PRESERVATIVE FREE 10 ML: 5 INJECTION INTRAVENOUS at 09:56

## 2023-09-07 RX ADMIN — ENOXAPARIN SODIUM 30 MG: 100 INJECTION SUBCUTANEOUS at 20:37

## 2023-09-07 RX ADMIN — ESCITALOPRAM 20 MG: 10 TABLET, FILM COATED ORAL at 09:52

## 2023-09-07 RX ADMIN — Medication 3 MG: at 22:17

## 2023-09-07 RX ADMIN — NYSTATIN: 100000 CREAM TOPICAL at 20:41

## 2023-09-07 RX ADMIN — PREDNISONE 40 MG: 20 TABLET ORAL at 09:53

## 2023-09-07 RX ADMIN — NYSTATIN: 100000 CREAM TOPICAL at 14:45

## 2023-09-07 RX ADMIN — OXYCODONE HYDROCHLORIDE AND ACETAMINOPHEN 1 TABLET: 5; 325 TABLET ORAL at 12:30

## 2023-09-07 RX ADMIN — CARVEDILOL 3.12 MG: 3.12 TABLET, FILM COATED ORAL at 20:35

## 2023-09-07 RX ADMIN — GUAIFENESIN 600 MG: 600 TABLET, EXTENDED RELEASE ORAL at 09:54

## 2023-09-07 RX ADMIN — OXYBUTYNIN CHLORIDE 5 MG: 5 TABLET, EXTENDED RELEASE ORAL at 09:52

## 2023-09-07 RX ADMIN — DOCUSATE SODIUM 100 MG: 100 CAPSULE, LIQUID FILLED ORAL at 20:35

## 2023-09-07 RX ADMIN — ANTI-FUNGAL POWDER MICONAZOLE NITRATE TALC FREE: 1.42 POWDER TOPICAL at 00:04

## 2023-09-07 RX ADMIN — INSULIN GLARGINE 55 UNITS: 100 INJECTION, SOLUTION SUBCUTANEOUS at 09:54

## 2023-09-07 RX ADMIN — PANTOPRAZOLE SODIUM 40 MG: 40 TABLET, DELAYED RELEASE ORAL at 20:35

## 2023-09-07 RX ADMIN — AZITHROMYCIN MONOHYDRATE 500 MG: 500 INJECTION, POWDER, LYOPHILIZED, FOR SOLUTION INTRAVENOUS at 14:39

## 2023-09-07 RX ADMIN — INSULIN LISPRO 12 UNITS: 100 INJECTION, SOLUTION INTRAVENOUS; SUBCUTANEOUS at 17:33

## 2023-09-07 RX ADMIN — INSULIN LISPRO 12 UNITS: 100 INJECTION, SOLUTION INTRAVENOUS; SUBCUTANEOUS at 12:26

## 2023-09-07 RX ADMIN — ANTI-FUNGAL POWDER MICONAZOLE NITRATE TALC FREE: 1.42 POWDER TOPICAL at 20:41

## 2023-09-07 RX ADMIN — SODIUM CHLORIDE, PRESERVATIVE FREE 10 ML: 5 INJECTION INTRAVENOUS at 20:38

## 2023-09-07 RX ADMIN — IPRATROPIUM BROMIDE AND ALBUTEROL SULFATE 1 DOSE: 2.5; .5 SOLUTION RESPIRATORY (INHALATION) at 20:11

## 2023-09-07 RX ADMIN — TOPIRAMATE 100 MG: 100 TABLET, FILM COATED ORAL at 22:17

## 2023-09-07 RX ADMIN — PANTOPRAZOLE SODIUM 40 MG: 40 TABLET, DELAYED RELEASE ORAL at 09:52

## 2023-09-07 RX ADMIN — CLOPIDOGREL BISULFATE 75 MG: 75 TABLET, FILM COATED ORAL at 17:33

## 2023-09-07 RX ADMIN — MAGNESIUM SULFATE HEPTAHYDRATE 4000 MG: 40 INJECTION, SOLUTION INTRAVENOUS at 00:07

## 2023-09-07 RX ADMIN — INSULIN LISPRO 4 UNITS: 100 INJECTION, SOLUTION INTRAVENOUS; SUBCUTANEOUS at 09:54

## 2023-09-07 RX ADMIN — DESMOPRESSIN ACETATE 40 MG: 0.2 TABLET ORAL at 20:35

## 2023-09-07 RX ADMIN — FLUCONAZOLE 100 MG: 100 TABLET ORAL at 09:53

## 2023-09-07 ASSESSMENT — PAIN SCALES - GENERAL
PAINLEVEL_OUTOF10: 2
PAINLEVEL_OUTOF10: 6

## 2023-09-07 ASSESSMENT — PAIN DESCRIPTION - LOCATION: LOCATION: BACK

## 2023-09-07 NOTE — PROGRESS NOTES
Physical Therapy  Facility/Department: [facility]  Physical Therapy Daily Treatment Note    Name: Russel Banks  : 1950  MRN: [id number]  Date of Service: 2023    Discharge Recommendations:  Patient would benefit from continued therapy after discharge   PT Equipment Recommendations  Equipment Needed: No (Pt reports owning a RW)      Patient Diagnosis(es): The primary encounter diagnosis was Leakage of aortic graft, initial encounter ([address]). Diagnoses of COPD exacerbation ([address]), Chest pressure, and Hyperglycemia were also pertinent to this visit. Past Medical History:  has a past medical history of Abdominal bloating, Adenomatous polyp of ascending colon, Allergic rhinitis, Anxiety, Arthritis, Asthma, Atrial fibrillation ([address]), Back pain, Benign hypertension with CKD (chronic kidney disease) stage III ([address]), CAD (coronary artery disease), Caffeine use, CHF (congestive heart failure) ([address]), Chronic kidney disease, CKD (chronic kidney disease) stage 3, GFR 30-59 ml/min ([facility]), COPD (chronic obstructive pulmonary disease) ([address]), Degeneration of lumbar or lumbosacral intervertebral disc, Depression, DM (diabetes mellitus) ([address]), Emphysema of lung ([address]), Gastritis, GERD (gastroesophageal reflux disease), Hearing loss, Hematuria, Hiatal hernia, HTN (hypertension), benign, Hypertriglyceridemia, Incontinence of urine, Irritable bowel syndrome with both constipation and diarrhea, Knee arthropathy, Lumbosacral spondylosis without myelopathy, Migraine headache, Mumps, Neuropathy, Obesity, On home oxygen therapy, HIGINIO on CPAP, Otitis media, Secondary diabetes mellitus with stage 3 chronic kidney disease (GFR 30-59) ([address]), Stroke ([address]), Tinnitus, Type 2 diabetes mellitus without complication ([address]), Type II or unspecified type diabetes mellitus without mention of complication, not stated as uncontrolled, UTI (lower urinary tract infection), and Varicose vein.   Past Surgical History:  has a past surgical

## 2023-09-07 NOTE — PROGRESS NOTES
edema  Neurologic: not done        Assessment / Acute Cardiac Problems:       Patient Active Problem List:     Chronic pain of left knee     Type 2 diabetes mellitus with diabetic polyneuropathy, with long-term current use of insulin (HCC)     COPD (chronic obstructive pulmonary disease)     Nonintractable migraine, unspecified migraine type     Coronary artery disease due to lipid rich plaque     DDD (degenerative disc disease), lumbar     Chronic, continuous use of opioids     DDD (degenerative disc disease), cervical     Osteoarthritis of cervical spine     GERD (gastroesophageal reflux disease)     Essential hypertension     Mixed hyperlipidemia     Insomnia     Lumbosacral spondylosis without myelopathy     Sacroiliitis, not elsewhere classified ([address])     Carpal tunnel syndrome     Mixed stress and urge urinary incontinence     Intractable migraine with aura without status migrainosus     Anxiety and depression     Chronic migraine without aura without status migrainosus, not intractable     Morbid (severe) obesity with alveolar hypoventilation ([facility])     Chronic nausea     Lung nodule     Neuropathy     Obstructive sleep apnea syndrome     Asthma with COPD ([facility])     Obesity, Class III, BMI 40-49.9 (morbid obesity) ([address])     Stage 3 chronic kidney disease (HCC)     Benign hypertension with CKD (chronic kidney disease) stage III (HCC)     Secondary diabetes mellitus with stage 3 chronic kidney disease (GFR 30-59) (HCC)     CKD (chronic kidney disease) stage 3, GFR 30-59 ml/min     Lumbar radiculopathy, chronic     Sessile colonic polyp     Morbid obesity (HCC)     Adenomatous polyp of ascending colon     Irritable bowel syndrome with both constipation and diarrhea     Abdominal bloating     Major depressive disorder, single episode, unspecified     Permanent atrial fibrillation (HCC)     Polyneuropathy, unspecified     Atypical chest pain     Left ventricular diastolic dysfunction     Transaminasemia     Acute respiratory failure with hypoxia and hypercarbia (HCC)     Macrocytosis     Morbid obesity with BMI of 40.0-44.9, adult (HCC)     Chronic diastolic congestive heart failure (HCC)     Oxygen dependent     Chronic bilateral low back pain with bilateral sciatica     Left lower lobe pneumonia     Prolonged Q-T interval on ECG     Chronic respiratory failure with hypoxia (HCC)     Pneumonia     Chest pain     Leg cramping     Leg cramp     Occlusion of left posterior cerebral artery     RUE numbness     Chronic use of opiate for therapeutic purpose     Opioid dependence with current use (HCC)     Chronic vulvitis     Angina pectoris (HCC)     COPD exacerbation (HCC)     Type 2 diabetes mellitus with hyperosmolar nonketotic hyperglycemia ([address]        Plan of Treatment:   Chest pain, musculoskeletal. Improved with cough suppressant and muclytic. Continue IMDUR. Echo ordered, results pending. OK to follow-up with as OP  Sinus Rhythm with Sinus Arrhythmia. Continue BB  BP stable. Continue BB and Losartan. Continue ASA and Lipitor. OK to d/c from CV standpoint w/ follow-up with Primary Cardiologist and OP echo.      Electronically signed by VERONIKA Gomran CNP on 9/7/2023 at 10:41 AM  [facility].  [phone]

## 2023-09-07 NOTE — PLAN OF CARE
Problem: Chronic Conditions and Co-morbidities  Goal: Patient's chronic conditions and co-morbidity symptoms are monitored and maintained or improved  Outcome: Progressing     Problem: Discharge Planning  Goal: Discharge to home or other facility with appropriate resources  Outcome: Progressing     Problem: Skin/Tissue Integrity  Goal: Absence of new skin breakdown  Description: 1. Monitor for areas of redness and/or skin breakdown  2. Assess vascular access sites hourly  3. Every 4-6 hours minimum:  Change oxygen saturation probe site  4. Every 4-6 hours:  If on nasal continuous positive airway pressure, respiratory therapy assess nares and determine need for appliance change or resting period.   Outcome: Progressing     Problem: Safety - Adult  Goal: Free from fall injury  Outcome: Progressing     Problem: Pain  Goal: Verbalizes/displays adequate comfort level or baseline comfort level  Outcome: Progressing     Problem: Respiratory - Adult  Goal: Achieves optimal ventilation and oxygenation  9/7/2023 [id number] by Rene Salazar RN  Outcome: Progressing  9/7/2023 0921 by Geo An RCP  Outcome: Progressing

## 2023-09-07 NOTE — PROGRESS NOTES
[facility]    [address]    Progress Note    9/7/2023    4:20 PM    Name:   Rebeca Molina  MRN:     [id number]     Acct:      [de-identified]   Room:   [address] [place] [address]  IP Day:  2  Admit Date:  9/5/2023  2:18 PM    PCP:   VERONIKA Sampson CNP  Code Status:  Full Code    Subjective:     C/C:   Chief Complaint   Patient presents with    Shortness of Breath     Interval History Status: improved. Seen at bedside  States that her chest chest/shortness of breath has improved, no longer feels dizzy , headaches  Currently requiring 3 L nasal cannula to maintain saturation  Cardiology on board, pending echo    Brief History:     79-year-old female with known history of essential hypertension, A-fib s/p ablation, diabetes mellitus, COPD, multiple strokes who came in with chest pressure and worsening shortness of breath for last 3 days, has no been on oxygen before but been wearing oxygen at home for last few days, states that she also had episodes of dizziness and headache ,stated that her sugars were high when she checked at home in 500s, admitted for COPD exacerbation, cardiology consulted for evaluation of chest pain. Review of Systems:     Constitutional:  negative for chills, fevers, sweats  Respiratory: Positive for shortness of breath, no wheezing  Cardiovascular:  negative for chest pain, chest pressure/discomfort, lower extremity edema, palpitations  Gastrointestinal:  negative for abdominal pain, constipation, diarrhea, nausea, vomiting  Neurological:  negative for dizziness, headache    Medications: Allergies: Allergies   Allergen Reactions    Robitussin [Guaifenesin] Anaphylaxis    Codeine Swelling    Compazine [Prochlorperazine Maleate] Hives and Swelling    Fentanyl     Iodides Itching    Morphine Other (See Comments)     hallucinations    Moxifloxacin      Other reaction(s):  Intolerance-unknown  Other reaction(s): 09/06/23  1254 09/06/23  1716 09/06/23 2000 09/06/23 2008 09/06/23  2121 09/07/23  0747 09/07/23  1139   PROT 7.2  --   --   --   --  6.3*  --   --   --    LABALBU 3.9  --   --   --   --  3.6  --   --   --    AST 30  --   --   --   --  16  --   --   --    ALT 26  --   --   --   --  18  --   --   --    ALKPHOS 88  --   --   --   --  69  --   --   --    BILITOT 0.4  --   --   --   --  0.3  --   --   --    BILIDIR  --   --   --   --   --  0.1  --   --   --    POCGLU  --    < > 340* 295* 434*  --  402* 201* 315*    < > = values in this interval not displayed. ABG:  Lab Results   Component Value Date/Time    POCPH 7.239 10/06/2021 02:02 PM    PH 7.346 11/21/2011 08:35 AM    POCPCO2 77.6 10/06/2021 02:02 PM    PCO2 49.8 11/21/2011 08:35 AM    POCPO2 137.2 10/06/2021 02:02 PM    PO2ART 61.3 11/21/2011 08:35 AM    POCHCO3 33.1 10/06/2021 02:02 PM    NOF5CRF 26.5 11/21/2011 08:35 AM    NBEA NOT REPORTED 10/06/2021 02:02 PM    PBEA 3 10/06/2021 02:02 PM    NXF2QOS NOT REPORTED 10/06/2021 02:02 PM    EEIT2ACQ 98 10/06/2021 02:02 PM    C1XZKYKT 89.7 11/21/2011 08:35 AM    FIO2 INFORMATION NOT PROVIDED 09/05/2023 10:42 PM     Lab Results   Component Value Date/Time    SPECIAL L AC 09/05/2023 05:13 PM     Lab Results   Component Value Date/Time    CULTURE NO GROWTH 1 DAY 09/05/2023 05:13 PM       Radiology:  XR CHEST PORTABLE    Result Date: 9/6/2023  No acute process. XR CHEST PORTABLE    Result Date: 9/5/2023  Stable appearance of the chest with no acute findings.        Physical Examination:        General appearance:  alert, cooperative and no distress  Mental Status:  oriented to person, place and time and normal affect  Lungs: Reproducible chest pain, clear to auscultation bilaterally, normal effort  Heart:  regular rate and rhythm, no murmur  Abdomen:  soft, nontender, nondistended, normal bowel sounds, no masses, hepatomegaly, splenomegaly  Extremities:  no edema, redness, tenderness in the calves  Skin:  no

## 2023-09-07 NOTE — PROGRESS NOTES
Comprehensive Nutrition Assessment    Type and Reason for Visit:  Positive Nutrition Screen (wt loss/poor po)    Nutrition Recommendations/Plan:   Continue current diet, CCHO-Medium with SERGEY  Provided CCHO Diet handout from [facility]. Pt would benefit from outpatient nutrition counseling. Monitor PO intake     Malnutrition Assessment:  Malnutrition Status:  No malnutrition (09/07/23 1504)    Context:  Acute Illness     Findings of the 6 clinical characteristics of malnutrition:  Energy Intake:  Mild decrease in energy intake (Comment)  Weight Loss:  No significant weight loss     Body Fat Loss:  No significant body fat loss     Muscle Mass Loss:  No significant muscle mass loss    Fluid Accumulation:  Mild     Strength:  Not Performed    Nutrition Assessment:    66 yo F adm COPD exacerbation. PMH significant for COPD, T2DM, HFpEF, GERD, TIA. Pt reports fair appetite, > 50% PO intake observed at lunch at visit. Pt reports her UBW was 265 lb x 3 mos ago, though stated UBW is not consistent with charted wts. Pt reports wt loss is d/t reduced appetite and increased exercise. Per chart, pt with 3.2% wt loss x 12 mos (not significant), additional wt fluctuations noted suspect d/t fluid. Pt voiced interest in learning about CCHO Diet, handouts provided from [facility] and encouraged pt to pursue outpatient nutrition counseling. No BM noted, with active bowel sounds. Generalized edema noted. Nutrition Related Findings:    labs/meds reviewed Wound Type: None       Current Nutrition Intake & Therapies:    Average Meal Intake: 51-75%  Average Supplements Intake: None Ordered  ADULT DIET; Regular; 4 carb choices (60 gm/meal); No Added Salt (3-4 gm)    Anthropometric Measures:  Height: 5' 1.81\" (157 cm)  Ideal Body Weight (IBW): 109 lbs (50 kg)    Admission Body Weight: 239 lb (108.4 kg) (9/7)  Current Body Weight: 239 lb (108.4 kg) (9/7), 219.3 % IBW.     Current BMI (kg/m2): 44  Usual Body Weight: 247 lb (112 kg) (8/15/22)  %

## 2023-09-07 NOTE — PROGRESS NOTES
reach; Left in bed;Bed alarm in place  Restraints  Restraints Initially in Place: No  Balance  Sitting: Without support (Pt sat unsupported EOB ~45 min with SBA. Pt donned socks while sitting EOB with SBA. Demo'd 0 LOB.)  Standing: Without support (Pt stood ~2 min with CGA and use of RW for support. Demo'd 0 LOB.)  Transfer Training  Transfer Training: Yes  Overall Level of Assistance: Contact-guard assistance; Adaptive equipment  Interventions: Safety awareness training;Verbal cues (Verbal and safety cues for hand placement from bed to RW.)  Sit to Stand: Contact-guard assistance; Adaptive equipment  Stand to Sit: Contact-guard assistance; Adaptive equipment; Additional time  Gait  Overall Level of Assistance: Contact-guard assistance; Adaptive equipment; Additional time (Pt completed functional mobility from EOB to chair with CGA and use of RW.)  Interventions: Safety awareness training;Verbal cues (Verbal cues required for safety awareness prior to sitting in chair.)  Assistive Device: Walker, rolling;Gait belt     AROM: Generally decreased, functional (Internal rotation generally decreased, ~60 degrees, still functional for ADLs)  Strength: Generally decreased, functional (Grossly 3+/5; Internal rotation of shoulder 3-/5)  Coordination: Within functional limits  Tone: Normal  Sensation: Intact  ADL  Feeding: Independent  Grooming: Modified independent ; Increased time to complete  UE Bathing: Contact guard assistance; Increased time to complete  LE Bathing: Minimal assistance; Increased time to complete;Verbal cueing  UE Dressing: Contact guard assistance; Increased time to complete  LE Dressing: Minimal assistance; Increased time to complete;Verbal cueing  LE Dressing Skilled Clinical Factors: Pt donned socks while sitting EOB with Min A d/t OT/S needing to thread R sock and pt fully pulling it up and donning L sock with SBA d/t need for safety. Pt required encouragement to don socks on her own.   Toileting: Minimal Emilee OT/S

## 2023-09-07 NOTE — PLAN OF CARE
Problem: Respiratory - Adult  Goal: Achieves optimal ventilation and oxygenation  Outcome: Progressing   BRONCHOSPASM/BRONCHOCONSTRICTION     [x]         IMPROVE AERATION/BREATH SOUNDS  [x]   ADMINISTER BRONCHODILATOR THERAPY AS APPROPRIATE  [x]   ASSESS BREATH SOUNDS  []   IMPLEMENT AEROSOL/MDI PROTOCOL  [x]   PATIENT EDUCATION AS NEEDED   NONINVASIVE VENTILATION    PROVIDE OPTIMAL VENTILATION/ACCEPTABLE SPO2   IMPLEMENT NONINVASIVE VENTILATION PROTOCOL   MAINTAIN ACCEPTABLE SPO2   ASSESS SKIN INTEGRITY/BREAKDOWN SCORE   PATIENT EDUCATION AS NEEDED   BIPAP AS NEEDED

## 2023-09-08 PROBLEM — R07.89 CHEST PRESSURE: Status: ACTIVE | Noted: 2023-09-08

## 2023-09-08 LAB
ANION GAP SERPL CALCULATED.3IONS-SCNC: 9 MMOL/L (ref 9–17)
BASOPHILS # BLD: 0.04 K/UL (ref 0–0.2)
BASOPHILS NFR BLD: 1 % (ref 0–2)
BUN SERPL-MCNC: 20 MG/DL (ref 8–23)
CALCIUM SERPL-MCNC: 8.9 MG/DL (ref 8.6–10.4)
CHLORIDE SERPL-SCNC: 103 MMOL/L (ref 98–107)
CO2 SERPL-SCNC: 26 MMOL/L (ref 20–31)
CREAT SERPL-MCNC: 1 MG/DL (ref 0.5–0.9)
EOSINOPHIL # BLD: 0.04 K/UL (ref 0–0.44)
EOSINOPHILS RELATIVE PERCENT: 1 % (ref 1–4)
ERYTHROCYTE [DISTWIDTH] IN BLOOD BY AUTOMATED COUNT: 12.6 % (ref 11.8–14.4)
EST. AVERAGE GLUCOSE BLD GHB EST-MCNC: 421 MG/DL
GFR SERPL CREATININE-BSD FRML MDRD: 59 ML/MIN/1.73M2
GLUCOSE BLD-MCNC: 127 MG/DL (ref 65–105)
GLUCOSE BLD-MCNC: 178 MG/DL (ref 65–105)
GLUCOSE BLD-MCNC: 227 MG/DL (ref 65–105)
GLUCOSE BLD-MCNC: 246 MG/DL (ref 65–105)
GLUCOSE SERPL-MCNC: 169 MG/DL (ref 70–99)
HBA1C MFR BLD: 16.3 % (ref 4–6)
HCT VFR BLD AUTO: 39.7 % (ref 36.3–47.1)
HGB BLD-MCNC: 12.1 G/DL (ref 11.9–15.1)
IMM GRANULOCYTES # BLD AUTO: 0.07 K/UL (ref 0–0.3)
IMM GRANULOCYTES NFR BLD: 1 %
LYMPHOCYTES NFR BLD: 2.01 K/UL (ref 1.1–3.7)
LYMPHOCYTES RELATIVE PERCENT: 28 % (ref 24–43)
MCH RBC QN AUTO: 30.5 PG (ref 25.2–33.5)
MCHC RBC AUTO-ENTMCNC: 30.5 G/DL (ref 28.4–34.8)
MCV RBC AUTO: 100 FL (ref 82.6–102.9)
MONOCYTES NFR BLD: 0.65 K/UL (ref 0.1–1.2)
MONOCYTES NFR BLD: 9 % (ref 3–12)
NEUTROPHILS NFR BLD: 62 % (ref 36–65)
NEUTS SEG NFR BLD: 4.47 K/UL (ref 1.5–8.1)
NRBC BLD-RTO: 0 PER 100 WBC
PLATELET # BLD AUTO: 177 K/UL (ref 138–453)
PMV BLD AUTO: 12.2 FL (ref 8.1–13.5)
POTASSIUM SERPL-SCNC: 4.4 MMOL/L (ref 3.7–5.3)
RBC # BLD AUTO: 3.97 M/UL (ref 3.95–5.11)
SODIUM SERPL-SCNC: 138 MMOL/L (ref 135–144)
WBC OTHER # BLD: 7.3 K/UL (ref 3.5–11.3)

## 2023-09-08 PROCEDURE — 99232 SBSQ HOSP IP/OBS MODERATE 35: CPT | Performed by: STUDENT IN AN ORGANIZED HEALTH CARE EDUCATION/TRAINING PROGRAM

## 2023-09-08 PROCEDURE — 6370000000 HC RX 637 (ALT 250 FOR IP): Performed by: INTERNAL MEDICINE

## 2023-09-08 PROCEDURE — 97110 THERAPEUTIC EXERCISES: CPT

## 2023-09-08 PROCEDURE — 6370000000 HC RX 637 (ALT 250 FOR IP): Performed by: STUDENT IN AN ORGANIZED HEALTH CARE EDUCATION/TRAINING PROGRAM

## 2023-09-08 PROCEDURE — 1200000000 HC SEMI PRIVATE

## 2023-09-08 PROCEDURE — 2700000000 HC OXYGEN THERAPY PER DAY

## 2023-09-08 PROCEDURE — 2580000003 HC RX 258: Performed by: NURSE PRACTITIONER

## 2023-09-08 PROCEDURE — 94640 AIRWAY INHALATION TREATMENT: CPT

## 2023-09-08 PROCEDURE — 85025 COMPLETE CBC W/AUTO DIFF WBC: CPT

## 2023-09-08 PROCEDURE — 6360000002 HC RX W HCPCS: Performed by: NURSE PRACTITIONER

## 2023-09-08 PROCEDURE — 36415 COLL VENOUS BLD VENIPUNCTURE: CPT

## 2023-09-08 PROCEDURE — 97530 THERAPEUTIC ACTIVITIES: CPT

## 2023-09-08 PROCEDURE — 94660 CPAP INITIATION&MGMT: CPT

## 2023-09-08 PROCEDURE — 6370000000 HC RX 637 (ALT 250 FOR IP): Performed by: NURSE PRACTITIONER

## 2023-09-08 PROCEDURE — 94761 N-INVAS EAR/PLS OXIMETRY MLT: CPT

## 2023-09-08 PROCEDURE — 80048 BASIC METABOLIC PNL TOTAL CA: CPT

## 2023-09-08 PROCEDURE — 99223 1ST HOSP IP/OBS HIGH 75: CPT | Performed by: INTERNAL MEDICINE

## 2023-09-08 PROCEDURE — 82947 ASSAY GLUCOSE BLOOD QUANT: CPT

## 2023-09-08 PROCEDURE — 99233 SBSQ HOSP IP/OBS HIGH 50: CPT | Performed by: NURSE PRACTITIONER

## 2023-09-08 RX ORDER — AZITHROMYCIN 250 MG/1
500 TABLET, FILM COATED ORAL ONCE
Status: COMPLETED | OUTPATIENT
Start: 2023-09-08 | End: 2023-09-08

## 2023-09-08 RX ORDER — INSULIN LISPRO 100 [IU]/ML
0-4 INJECTION, SOLUTION INTRAVENOUS; SUBCUTANEOUS NIGHTLY
Status: DISCONTINUED | OUTPATIENT
Start: 2023-09-08 | End: 2023-09-09 | Stop reason: HOSPADM

## 2023-09-08 RX ORDER — AZITHROMYCIN 250 MG/1
250 TABLET, FILM COATED ORAL DAILY
Status: DISCONTINUED | OUTPATIENT
Start: 2023-09-09 | End: 2023-09-09 | Stop reason: HOSPADM

## 2023-09-08 RX ORDER — INSULIN GLARGINE 100 [IU]/ML
50 INJECTION, SOLUTION SUBCUTANEOUS 2 TIMES DAILY
Status: DISCONTINUED | OUTPATIENT
Start: 2023-09-08 | End: 2023-09-09 | Stop reason: HOSPADM

## 2023-09-08 RX ORDER — INSULIN LISPRO 100 [IU]/ML
0-4 INJECTION, SOLUTION INTRAVENOUS; SUBCUTANEOUS
Status: DISCONTINUED | OUTPATIENT
Start: 2023-09-08 | End: 2023-09-09 | Stop reason: HOSPADM

## 2023-09-08 RX ORDER — CHOLECALCIFEROL (VITAMIN D3) 125 MCG
5 CAPSULE ORAL ONCE
Status: COMPLETED | OUTPATIENT
Start: 2023-09-08 | End: 2023-09-08

## 2023-09-08 RX ADMIN — ASPIRIN 81 MG: 81 TABLET, COATED ORAL at 10:21

## 2023-09-08 RX ADMIN — CLOPIDOGREL BISULFATE 75 MG: 75 TABLET, FILM COATED ORAL at 10:21

## 2023-09-08 RX ADMIN — BUDESONIDE AND FORMOTEROL FUMARATE DIHYDRATE 2 PUFF: 160; 4.5 AEROSOL RESPIRATORY (INHALATION) at 21:54

## 2023-09-08 RX ADMIN — NYSTATIN: 100000 CREAM TOPICAL at 10:24

## 2023-09-08 RX ADMIN — ENOXAPARIN SODIUM 30 MG: 100 INJECTION SUBCUTANEOUS at 10:31

## 2023-09-08 RX ADMIN — ESCITALOPRAM 20 MG: 10 TABLET, FILM COATED ORAL at 10:21

## 2023-09-08 RX ADMIN — ANTI-FUNGAL POWDER MICONAZOLE NITRATE TALC FREE: 1.42 POWDER TOPICAL at 10:29

## 2023-09-08 RX ADMIN — BUDESONIDE AND FORMOTEROL FUMARATE DIHYDRATE 2 PUFF: 160; 4.5 AEROSOL RESPIRATORY (INHALATION) at 08:52

## 2023-09-08 RX ADMIN — NYSTATIN: 100000 CREAM TOPICAL at 15:11

## 2023-09-08 RX ADMIN — GUAIFENESIN 600 MG: 600 TABLET, EXTENDED RELEASE ORAL at 10:21

## 2023-09-08 RX ADMIN — OXYCODONE HYDROCHLORIDE AND ACETAMINOPHEN 1 TABLET: 5; 325 TABLET ORAL at 19:52

## 2023-09-08 RX ADMIN — GUAIFENESIN 600 MG: 600 TABLET, EXTENDED RELEASE ORAL at 21:29

## 2023-09-08 RX ADMIN — ENOXAPARIN SODIUM 30 MG: 100 INJECTION SUBCUTANEOUS at 21:28

## 2023-09-08 RX ADMIN — IPRATROPIUM BROMIDE AND ALBUTEROL SULFATE 1 DOSE: 2.5; .5 SOLUTION RESPIRATORY (INHALATION) at 08:51

## 2023-09-08 RX ADMIN — FENOFIBRATE 160 MG: 160 TABLET ORAL at 10:21

## 2023-09-08 RX ADMIN — Medication 5 MG: at 22:56

## 2023-09-08 RX ADMIN — PANTOPRAZOLE SODIUM 40 MG: 40 TABLET, DELAYED RELEASE ORAL at 10:21

## 2023-09-08 RX ADMIN — NYSTATIN: 100000 CREAM TOPICAL at 21:31

## 2023-09-08 RX ADMIN — ANTI-FUNGAL POWDER MICONAZOLE NITRATE TALC FREE: 1.42 POWDER TOPICAL at 21:30

## 2023-09-08 RX ADMIN — PREDNISONE 40 MG: 20 TABLET ORAL at 10:21

## 2023-09-08 RX ADMIN — TOPIRAMATE 100 MG: 100 TABLET, FILM COATED ORAL at 21:29

## 2023-09-08 RX ADMIN — DOCUSATE SODIUM 100 MG: 100 CAPSULE, LIQUID FILLED ORAL at 21:29

## 2023-09-08 RX ADMIN — INSULIN LISPRO 1 UNITS: 100 INJECTION, SOLUTION INTRAVENOUS; SUBCUTANEOUS at 17:32

## 2023-09-08 RX ADMIN — INSULIN GLARGINE 50 UNITS: 100 INJECTION, SOLUTION SUBCUTANEOUS at 21:26

## 2023-09-08 RX ADMIN — OXYBUTYNIN CHLORIDE 5 MG: 5 TABLET, EXTENDED RELEASE ORAL at 10:21

## 2023-09-08 RX ADMIN — DOCUSATE SODIUM 100 MG: 100 CAPSULE, LIQUID FILLED ORAL at 10:29

## 2023-09-08 RX ADMIN — METFORMIN HYDROCHLORIDE 1000 MG: 500 TABLET ORAL at 10:21

## 2023-09-08 RX ADMIN — DESMOPRESSIN ACETATE 40 MG: 0.2 TABLET ORAL at 21:29

## 2023-09-08 RX ADMIN — METFORMIN HYDROCHLORIDE 1000 MG: 500 TABLET ORAL at 17:32

## 2023-09-08 RX ADMIN — AZITHROMYCIN 500 MG: 250 TABLET, FILM COATED ORAL at 15:11

## 2023-09-08 RX ADMIN — PANTOPRAZOLE SODIUM 40 MG: 40 TABLET, DELAYED RELEASE ORAL at 21:29

## 2023-09-08 RX ADMIN — IPRATROPIUM BROMIDE AND ALBUTEROL SULFATE 1 DOSE: 2.5; .5 SOLUTION RESPIRATORY (INHALATION) at 21:54

## 2023-09-08 RX ADMIN — SODIUM CHLORIDE, PRESERVATIVE FREE 10 ML: 5 INJECTION INTRAVENOUS at 10:25

## 2023-09-08 ASSESSMENT — PAIN SCALES - GENERAL: PAINLEVEL_OUTOF10: 9

## 2023-09-08 ASSESSMENT — PAIN DESCRIPTION - DESCRIPTORS: DESCRIPTORS: ACHING

## 2023-09-08 ASSESSMENT — PAIN - FUNCTIONAL ASSESSMENT: PAIN_FUNCTIONAL_ASSESSMENT: PREVENTS OR INTERFERES SOME ACTIVE ACTIVITIES AND ADLS

## 2023-09-08 ASSESSMENT — PAIN DESCRIPTION - LOCATION: LOCATION: BACK

## 2023-09-08 ASSESSMENT — PAIN DESCRIPTION - ORIENTATION: ORIENTATION: POSTERIOR

## 2023-09-08 NOTE — PROGRESS NOTES
[facility]   Progress Note                   Date:   9/8/2023  Patient name: Emily Washington  Date of admission:  9/5/2023  2:18 PM  MRN:   [id number]  YOB: 1950  PCP: VERONIKA Nettles CNP    Reason for Admission: Chest pressure [R07.89]  Hyperglycemia [R73.9]  COPD exacerbation ([address]) [J44.1]    Subjective:       Clinical Changes / Abnormalities: Patient seen and examined in room alone. No acute CV events overnight. Labs, tele, vitals reviewed.    BP on lower side this am       Medications:   Scheduled Meds:   metFORMIN  1,000 mg Oral BID WC    azithromycin  500 mg IntraVENous Q24H    insulin glargine  60 Units SubCUTAneous BID    clopidogrel  75 mg Oral Daily    predniSONE  40 mg Oral Daily    guaiFENesin  600 mg Oral BID    nystatin   Topical TID    atorvastatin  40 mg Oral Nightly    insulin lispro  0-16 Units SubCUTAneous TID WC    insulin lispro  0-4 Units SubCUTAneous Nightly    ipratropium 0.5 mg-albuterol 2.5 mg  1 Dose Inhalation BID RT    budesonide-formoterol  2 puff Inhalation BID    topiramate  100 mg Oral Nightly    miconazole   Topical BID    carvedilol  3.125 mg Oral BID    docusate sodium  100 mg Oral BID    escitalopram  20 mg Oral QAM    fenofibrate  160 mg Oral Daily    isosorbide dinitrate  30 mg Oral Daily    [Held by provider] losartan  25 mg Oral Daily    oxybutynin  5 mg Oral Daily    pantoprazole  40 mg Oral BID    aspirin  81 mg Oral Daily    sodium chloride flush  5-40 mL IntraVENous 2 times per day    enoxaparin  30 mg SubCUTAneous BID     Continuous Infusions:   dextrose 5 % and 0.45 % NaCl Stopped (09/06/23 0930)    sodium chloride      dextrose       CBC:   Recent Labs     09/06/23  0501 09/06/23 2008 09/08/23  0703   WBC 7.7 6.7 7.3   HGB 13.3 12.6 12.1    173 177       BMP:    Recent Labs     09/07/23  1147 09/07/23  1532 09/08/23  0703   * 133* 138   K 4.9 4.9 4.4   CL 96* 99 103   CO2 28 23 26   BUN 16 19 20   CREATININE Abdominal bloating     Major depressive disorder, single episode, unspecified     Permanent atrial fibrillation (HCC)     Polyneuropathy, unspecified     Atypical chest pain     Left ventricular diastolic dysfunction     Transaminasemia     Acute respiratory failure with hypoxia and hypercarbia (HCC)     Macrocytosis     Morbid obesity with BMI of 40.0-44.9, adult (HCC)     Chronic diastolic congestive heart failure (HCC)     Oxygen dependent     Chronic bilateral low back pain with bilateral sciatica     Left lower lobe pneumonia     Prolonged Q-T interval on ECG     Chronic respiratory failure with hypoxia (HCC)     Pneumonia     Chest pain     Leg cramping     Leg cramp     Occlusion of left posterior cerebral artery     RUE numbness     Chronic use of opiate for therapeutic purpose     Opioid dependence with current use (HCC)     Chronic vulvitis     Angina pectoris (HCC)     COPD exacerbation (HCC)     Type 2 diabetes mellitus with hyperosmolar nonketotic hyperglycemia ([address])        Plan of Treatment:   Chest pain, musculoskeletal. Improved with cough suppressant and muclytic. Continue IMDUR. Echo reviewed. Preserved EF   Sinus Rhythm with Sinus Arrhythmia. Continue BB  BP low. Will d/c ARB. Switch coreg to lopressor. Increase fluids. Continue ASA and Lipitor. OK to d/c from CV standpoint w/ follow-up with Primary Cardiologist and OP echo.      Electronically signed by VERONIKA Jennings CNP on 9/8/2023 at 10:31 AM  [address] [facility].  [phone]

## 2023-09-08 NOTE — CARE COORDINATION
Transitional Planning:  Call to [facility]. [phone]. They confirm patient has aide services. 17:10  Spoke with pt. Plan remains home with [facility]. Pt states she has a portable O2 tank here for transport. Gets her O2 from [place].

## 2023-09-08 NOTE — PROGRESS NOTES
280* 127* 178* 246*    < > = values in this interval not displayed. ABG:  Lab Results   Component Value Date/Time    POCPH 7.239 10/06/2021 02:02 PM    PH 7.346 11/21/2011 08:35 AM    POCPCO2 77.6 10/06/2021 02:02 PM    PCO2 49.8 11/21/2011 08:35 AM    POCPO2 137.2 10/06/2021 02:02 PM    PO2ART 61.3 11/21/2011 08:35 AM    POCHCO3 33.1 10/06/2021 02:02 PM    LSB4AMW 26.5 11/21/2011 08:35 AM    NBEA NOT REPORTED 10/06/2021 02:02 PM    PBEA 3 10/06/2021 02:02 PM    ZVQ8PFC NOT REPORTED 10/06/2021 02:02 PM    TVRU0CND 98 10/06/2021 02:02 PM    G3UHLHGD 89.7 11/21/2011 08:35 AM    FIO2 INFORMATION NOT PROVIDED 09/05/2023 10:42 PM     Lab Results   Component Value Date/Time    SPECIAL L AC 09/05/2023 05:13 PM     Lab Results   Component Value Date/Time    CULTURE NO GROWTH 3 DAYS 09/05/2023 05:13 PM       Radiology:  XR CHEST PORTABLE    Result Date: 9/6/2023  No acute process. XR CHEST PORTABLE    Result Date: 9/5/2023  Stable appearance of the chest with no acute findings.        Physical Examination:        General appearance:  alert, cooperative and no distress  Mental Status:  oriented to person, place and time and normal affect  Lungs: Reproducible chest pain, clear to auscultation bilaterally, normal effort  Heart:  regular rate and rhythm, no murmur  Abdomen:  soft, nontender, nondistended, normal bowel sounds, no masses, hepatomegaly, splenomegaly  Extremities:  no edema, redness, tenderness in the calves  Skin:  no gross lesions, rashes, induration    Assessment:        Hospital Problems             Last Modified POA    * (Principal) COPD exacerbation ([address]) 9/5/2023 Yes    Leakage of aortic graft ([address]) 9/7/2023 Yes    Chest pressure 9/8/2023 Yes    Chronic diastolic congestive heart failure ([address]) 9/6/2023 Yes    Chronic bilateral low back pain with bilateral sciatica (Chronic) 9/6/2023 Yes    GERD (gastroesophageal reflux disease) (Chronic) 9/6/2023 Yes    Chronic nausea 9/6/2023 Yes    Neuropathy 9/6/2023 Yes    Obstructive sleep apnea syndrome (Chronic) 9/6/2023 Yes    Asthma with COPD ([address]) 9/6/2023 Yes    CKD (chronic kidney disease) stage 3, GFR 30-59 ml/min 9/6/2023 Yes    Permanent atrial fibrillation ([address]) 9/6/2023 Yes    Atypical chest pain 9/6/2023 Yes    Acute respiratory failure with hypoxia and hypercarbia ([address]) 9/6/2023 Yes    Morbid obesity with BMI of 40.0-44.9, adult ([address]) 9/6/2023 Yes    Type 2 diabetes mellitus with hyperosmolar nonketotic hyperglycemia ([address]) 9/6/2023 Yes    Uncontrolled type 2 diabetes mellitus with hyperglycemia ([address]) 9/7/2023 Yes     Plan:        COPD exacerbation-continue steroids, antibiotics, bronchodilators, RT aerosol protocol, follow cultures  Acute on chronic hypoxic respiratory failure-wean off oxygen as tolerated  Diabetes mellitus type 2-continue Lantus 50 twice daily with low-dose sliding scale, continue metformin  Chest pain-likely musculoskeletal in origin, improved with cough suppressant and mucolytic's, evaluated by cardiology as well this admission, echo showed preserved ejection fraction, no wall motion abnormalities, per cardiology service okay to follow-up with cardiology as outpatient with her primary cardiologist , continue aspirin, Lipitor, beta-blocker,  Essential hypertension/atrial fibrillation-continue beta-blocker, discontinue losartan because of soft blood pressure  History of stroke-continue aspirin, Lipitor, Plavix  History of HIGINIO-continue CPAP nightly and daytime naps  Candidal intertrigo-continue antifungal powder/cream    Cherie Hilario MD  9/8/2023  7:03 PM

## 2023-09-08 NOTE — PLAN OF CARE
Problem: Chronic Conditions and Co-morbidities  Goal: Patient's chronic conditions and co-morbidity symptoms are monitored and maintained or improved  9/8/2023 0414 by Juan Antonio Knox RN  Outcome: Progressing  9/7/2023 1855 by Darius Sanches RN  Outcome: Progressing     Problem: Discharge Planning  Goal: Discharge to home or other facility with appropriate resources  9/8/2023 0414 by Juan Antonio Knox RN  Outcome: Progressing  9/7/2023 1855 by Darius Sanches RN  Outcome: Progressing     Problem: Skin/Tissue Integrity  Goal: Absence of new skin breakdown  Description: 1. Monitor for areas of redness and/or skin breakdown  2. Assess vascular access sites hourly  3. Every 4-6 hours minimum:  Change oxygen saturation probe site  4. Every 4-6 hours:  If on nasal continuous positive airway pressure, respiratory therapy assess nares and determine need for appliance change or resting period.   9/8/2023 0414 by Juan Antonio Knox RN  Outcome: Progressing  9/7/2023 1855 by Darius Sanches RN  Outcome: Progressing     Problem: Safety - Adult  Goal: Free from fall injury  9/8/2023 0414 by Juan Antonio Knox RN  Outcome: Progressing  Flowsheets (Taken 9/7/2023 1951)  Free From Fall Injury: Instruct family/caregiver on patient safety  9/7/2023 1855 by Darius Sanches RN  Outcome: Progressing     Problem: Pain  Goal: Verbalizes/displays adequate comfort level or baseline comfort level  9/8/2023 0414 by Juan Antonio Knox RN  Outcome: Progressing  9/7/2023 1855 by Darius Sanches RN  Outcome: Progressing     Problem: Respiratory - Adult  Goal: Achieves optimal ventilation and oxygenation  9/8/2023 0414 by Juan Antonio Knox RN  Outcome: Progressing  9/7/2023 1855 by Darius Sanches RN  Outcome: Progressing

## 2023-09-08 NOTE — PROGRESS NOTES
Physical Therapy  Facility/Department: [facility]  Daily Treatment Note  NAME: Elida Lugo  : 1950  MRN: [id number]    Date of Service: 2023    Discharge Recommendations:  Patient would benefit from continued therapy after discharge   PT Equipment Recommendations  Equipment Needed: No    Patient Diagnosis(es): The primary encounter diagnosis was Leakage of aortic graft, initial encounter ([address]). Diagnoses of COPD exacerbation ([address]), Chest pressure, and Hyperglycemia were also pertinent to this visit. Assessment   Assessment: pt cristel therapy well, pt amb 6 ft RW CGA of 1 however defers further amb d/t fatigue. pt cristel therapy well with c/o feeling dizzy during therapy. pt paces herself well when she is feeling dizzy. pt is not safe to amb without assist @ this time as she is a falls risk. pt encouraged to cristel OOB to sit in recliner more often. Activity Tolerance: Patient limited by fatigue;Patient limited by endurance  Equipment Needed: No     Plan    Physical Therapy Plan  Current Treatment Recommendations: Strengthening; Functional mobility training; Endurance training;Stair training;Gait training;Pain management; Safety education & training     Restrictions  Restrictions/Precautions  Restrictions/Precautions: Up as Tolerated  Required Braces or Orthoses?: No     Subjective    Subjective  Subjective: pt agreeable to participate with therapy @ this time  Pain: pt with reports of having a headache, not rated  Orientation  Overall Orientation Status: Within Normal Limits  Orientation Level: Oriented X4  Cognition  Overall Cognitive Status: WNL     Objective   Comment: Upon arrival pt in bed with [facility] elevated  Bed Mobility Training  Bed Mobility Training: Yes  Overall Level of Assistance: Stand-by assistance  Supine to Sit: Stand-by assistance (HOB elevated, pt has electric bed @ home)  Scooting: Independent (to scoot to EOB to place feet onto floor)  Balance  Sitting: Without support (EOB

## 2023-09-08 NOTE — PLAN OF CARE
Problem: Chronic Conditions and Co-morbidities  Goal: Patient's chronic conditions and co-morbidity symptoms are monitored and maintained or improved  Outcome: Progressing     Problem: Discharge Planning  Goal: Discharge to home or other facility with appropriate resources  Outcome: Progressing     Problem: Skin/Tissue Integrity  Goal: Absence of new skin breakdown  Description: 1. Monitor for areas of redness and/or skin breakdown  2. Assess vascular access sites hourly  3. Every 4-6 hours minimum:  Change oxygen saturation probe site  4. Every 4-6 hours:  If on nasal continuous positive airway pressure, respiratory therapy assess nares and determine need for appliance change or resting period.   Outcome: Progressing     Problem: Safety - Adult  Goal: Free from fall injury  Outcome: Progressing     Problem: Pain  Goal: Verbalizes/displays adequate comfort level or baseline comfort level  Outcome: Progressing     Problem: Respiratory - Adult  Goal: Achieves optimal ventilation and oxygenation  Outcome: Progressing

## 2023-09-09 VITALS
BODY MASS INDEX: 43.98 KG/M2 | HEIGHT: 62 IN | DIASTOLIC BLOOD PRESSURE: 68 MMHG | HEART RATE: 70 BPM | TEMPERATURE: 97.7 F | RESPIRATION RATE: 22 BRPM | OXYGEN SATURATION: 95 % | SYSTOLIC BLOOD PRESSURE: 117 MMHG | WEIGHT: 239 LBS

## 2023-09-09 LAB
ANION GAP SERPL CALCULATED.3IONS-SCNC: 10 MMOL/L (ref 9–17)
BASOPHILS # BLD: 0.05 K/UL (ref 0–0.2)
BASOPHILS NFR BLD: 1 % (ref 0–2)
BUN SERPL-MCNC: 27 MG/DL (ref 8–23)
CALCIUM SERPL-MCNC: 9.1 MG/DL (ref 8.6–10.4)
CHLORIDE SERPL-SCNC: 100 MMOL/L (ref 98–107)
CO2 SERPL-SCNC: 25 MMOL/L (ref 20–31)
CREAT SERPL-MCNC: 1 MG/DL (ref 0.5–0.9)
EOSINOPHIL # BLD: <0.03 K/UL (ref 0–0.44)
EOSINOPHILS RELATIVE PERCENT: 0 % (ref 1–4)
ERYTHROCYTE [DISTWIDTH] IN BLOOD BY AUTOMATED COUNT: 12.7 % (ref 11.8–14.4)
GFR SERPL CREATININE-BSD FRML MDRD: 59 ML/MIN/1.73M2
GLUCOSE BLD-MCNC: 207 MG/DL (ref 65–105)
GLUCOSE BLD-MCNC: 232 MG/DL (ref 65–105)
GLUCOSE SERPL-MCNC: 289 MG/DL (ref 70–99)
HCT VFR BLD AUTO: 40 % (ref 36.3–47.1)
HGB BLD-MCNC: 12.2 G/DL (ref 11.9–15.1)
IMM GRANULOCYTES # BLD AUTO: 0.1 K/UL (ref 0–0.3)
IMM GRANULOCYTES NFR BLD: 1 %
LYMPHOCYTES NFR BLD: 2.01 K/UL (ref 1.1–3.7)
LYMPHOCYTES RELATIVE PERCENT: 28 % (ref 24–43)
MCH RBC QN AUTO: 30.1 PG (ref 25.2–33.5)
MCHC RBC AUTO-ENTMCNC: 30.5 G/DL (ref 28.4–34.8)
MCV RBC AUTO: 98.8 FL (ref 82.6–102.9)
MONOCYTES NFR BLD: 0.65 K/UL (ref 0.1–1.2)
MONOCYTES NFR BLD: 9 % (ref 3–12)
NEUTROPHILS NFR BLD: 61 % (ref 36–65)
NEUTS SEG NFR BLD: 4.29 K/UL (ref 1.5–8.1)
NRBC BLD-RTO: 0 PER 100 WBC
PLATELET # BLD AUTO: 143 K/UL (ref 138–453)
PMV BLD AUTO: 12.3 FL (ref 8.1–13.5)
POTASSIUM SERPL-SCNC: 4.4 MMOL/L (ref 3.7–5.3)
RBC # BLD AUTO: 4.05 M/UL (ref 3.95–5.11)
SODIUM SERPL-SCNC: 135 MMOL/L (ref 135–144)
WBC OTHER # BLD: 7.1 K/UL (ref 3.5–11.3)

## 2023-09-09 PROCEDURE — 94761 N-INVAS EAR/PLS OXIMETRY MLT: CPT

## 2023-09-09 PROCEDURE — 6370000000 HC RX 637 (ALT 250 FOR IP): Performed by: NURSE PRACTITIONER

## 2023-09-09 PROCEDURE — 82947 ASSAY GLUCOSE BLOOD QUANT: CPT

## 2023-09-09 PROCEDURE — 36415 COLL VENOUS BLD VENIPUNCTURE: CPT

## 2023-09-09 PROCEDURE — 6360000002 HC RX W HCPCS: Performed by: NURSE PRACTITIONER

## 2023-09-09 PROCEDURE — 80048 BASIC METABOLIC PNL TOTAL CA: CPT

## 2023-09-09 PROCEDURE — 6370000000 HC RX 637 (ALT 250 FOR IP): Performed by: INTERNAL MEDICINE

## 2023-09-09 PROCEDURE — 2700000000 HC OXYGEN THERAPY PER DAY

## 2023-09-09 PROCEDURE — 85025 COMPLETE CBC W/AUTO DIFF WBC: CPT

## 2023-09-09 PROCEDURE — 99239 HOSP IP/OBS DSCHRG MGMT >30: CPT | Performed by: STUDENT IN AN ORGANIZED HEALTH CARE EDUCATION/TRAINING PROGRAM

## 2023-09-09 PROCEDURE — 94640 AIRWAY INHALATION TREATMENT: CPT

## 2023-09-09 PROCEDURE — 6370000000 HC RX 637 (ALT 250 FOR IP): Performed by: STUDENT IN AN ORGANIZED HEALTH CARE EDUCATION/TRAINING PROGRAM

## 2023-09-09 RX ORDER — AZITHROMYCIN 250 MG/1
250 TABLET, FILM COATED ORAL DAILY
Qty: 3 TABLET | Refills: 0 | Status: SHIPPED | OUTPATIENT
Start: 2023-09-10 | End: 2023-09-13

## 2023-09-09 RX ORDER — BENZONATATE 100 MG/1
100 CAPSULE ORAL 3 TIMES DAILY PRN
Qty: 21 CAPSULE | Refills: 0 | Status: SHIPPED | OUTPATIENT
Start: 2023-09-09 | End: 2023-09-16

## 2023-09-09 RX ORDER — ALBUTEROL SULFATE 90 UG/1
2 AEROSOL, METERED RESPIRATORY (INHALATION) 4 TIMES DAILY PRN
Qty: 18 G | Refills: 5 | Status: SHIPPED | OUTPATIENT
Start: 2023-09-09

## 2023-09-09 RX ORDER — GUAIFENESIN 600 MG/1
600 TABLET, EXTENDED RELEASE ORAL 2 TIMES DAILY
Qty: 20 TABLET | Refills: 0 | Status: SHIPPED | OUTPATIENT
Start: 2023-09-09 | End: 2023-09-19

## 2023-09-09 RX ORDER — TIOTROPIUM BROMIDE 18 UG/1
18 CAPSULE ORAL; RESPIRATORY (INHALATION) DAILY
Qty: 90 CAPSULE | Refills: 1 | Status: SHIPPED | OUTPATIENT
Start: 2023-09-09 | End: 2023-09-09 | Stop reason: HOSPADM

## 2023-09-09 RX ORDER — PREDNISONE 20 MG/1
40 TABLET ORAL DAILY
Qty: 4 TABLET | Refills: 0 | Status: SHIPPED | OUTPATIENT
Start: 2023-09-10 | End: 2023-09-12

## 2023-09-09 RX ORDER — UMECLIDINIUM 62.5 UG/1
1 AEROSOL, POWDER ORAL DAILY
Qty: 30 EACH | Refills: 0 | Status: SHIPPED | OUTPATIENT
Start: 2023-09-09 | End: 2023-10-09

## 2023-09-09 RX ADMIN — PREDNISONE 40 MG: 20 TABLET ORAL at 09:40

## 2023-09-09 RX ADMIN — ASPIRIN 81 MG: 81 TABLET, COATED ORAL at 09:39

## 2023-09-09 RX ADMIN — CLOPIDOGREL BISULFATE 75 MG: 75 TABLET, FILM COATED ORAL at 09:39

## 2023-09-09 RX ADMIN — GUAIFENESIN 600 MG: 600 TABLET, EXTENDED RELEASE ORAL at 10:18

## 2023-09-09 RX ADMIN — IPRATROPIUM BROMIDE AND ALBUTEROL SULFATE 1 DOSE: 2.5; .5 SOLUTION RESPIRATORY (INHALATION) at 08:18

## 2023-09-09 RX ADMIN — ENOXAPARIN SODIUM 30 MG: 100 INJECTION SUBCUTANEOUS at 09:40

## 2023-09-09 RX ADMIN — BUDESONIDE AND FORMOTEROL FUMARATE DIHYDRATE 2 PUFF: 160; 4.5 AEROSOL RESPIRATORY (INHALATION) at 08:18

## 2023-09-09 RX ADMIN — FENOFIBRATE 160 MG: 160 TABLET ORAL at 09:39

## 2023-09-09 RX ADMIN — OXYBUTYNIN CHLORIDE 5 MG: 5 TABLET, EXTENDED RELEASE ORAL at 09:40

## 2023-09-09 RX ADMIN — NYSTATIN: 100000 CREAM TOPICAL at 09:40

## 2023-09-09 RX ADMIN — ESCITALOPRAM 20 MG: 10 TABLET, FILM COATED ORAL at 09:39

## 2023-09-09 RX ADMIN — INSULIN LISPRO 1 UNITS: 100 INJECTION, SOLUTION INTRAVENOUS; SUBCUTANEOUS at 09:39

## 2023-09-09 RX ADMIN — ANTI-FUNGAL POWDER MICONAZOLE NITRATE TALC FREE: 1.42 POWDER TOPICAL at 09:41

## 2023-09-09 RX ADMIN — METOPROLOL TARTRATE 12.5 MG: 25 TABLET ORAL at 09:40

## 2023-09-09 RX ADMIN — PANTOPRAZOLE SODIUM 40 MG: 40 TABLET, DELAYED RELEASE ORAL at 09:40

## 2023-09-09 RX ADMIN — DOCUSATE SODIUM 100 MG: 100 CAPSULE, LIQUID FILLED ORAL at 09:39

## 2023-09-09 RX ADMIN — AZITHROMYCIN 250 MG: 250 TABLET, FILM COATED ORAL at 09:39

## 2023-09-09 RX ADMIN — METFORMIN HYDROCHLORIDE 1000 MG: 500 TABLET ORAL at 09:40

## 2023-09-09 RX ADMIN — INSULIN GLARGINE 50 UNITS: 100 INJECTION, SOLUTION SUBCUTANEOUS at 09:39

## 2023-09-09 NOTE — CARE COORDINATION
Discharge [address] Case Management Department  Written by: Sindi Bennett RN    Patient Name: Russel Banks  Attending Provider: Jocy Tracy MD  Admit Date: 2023  2:18 PM  MRN: [id number]  Account: [de-identified]                     : 1950  Discharge Date: 23      Disposition: home    Met with patient to discuss transitional planning. Plan is to return home. Patient has home health aide services through [facility] home care. Patient has home O2, walker, scooter. Patient's portable O2 tank is at bedside. Patient reports she will call for cab ride home. Patient denies needs. Patient agreeable to plan.      Sindi Bennett RN

## 2023-09-09 NOTE — DISCHARGE INSTRUCTIONS
Please follow-up with your PCP and heart doctor as outpatient to discuss any medication change done in the hospital and further optimization. We have started you on Lopressor, stop taking Cozaar(losartan) and carvedilol. Continue taking antibiotics, steroids, Mucinex and cough medicine as prescribed, will also give you new inhalers on discharge, come back to ER for worsening symptoms.   Discussed with your PCP regarding starting you on blood thinners for irregular heart rate, discussed with your cardiologist to repeat echocardiogram.

## 2023-09-09 NOTE — DISCHARGE INSTR - COC
Continuity of Care Form    Patient Name: Pj Gaston   :  1950  MRN:  [id number]    Admit date:  2023  Discharge date:  ***    Code Status Order: Full Code   Advance Directives:     Admitting Physician:  No admitting provider for patient encounter. PCP: Hawk Miguel APRN - CNP    Discharging Nurse: [facility] Unit/Room#: 5828/1046-19  Discharging Unit Phone Number: ***    Emergency Contact:   Extended Emergency Contact Information  Primary Emergency Contact: Aden Kirkland *HIPAA  Address: [place] [address] [address] Phone: [phone]  Work Phone: [phone]  Mobile Phone: [phone]  Relation: Child  Secondary Emergency Contact: David Waller  Mobile Phone: [phone]  Relation: Other Relative   needed? No    Past Surgical History:  Past Surgical History:   Procedure Laterality Date    ABLATION OF DYSRHYTHMIC FOCUS      CARPAL TUNNEL RELEASE Right     CATARACT REMOVAL WITH IMPLANT Bilateral     CHOLECYSTECTOMY      COLONOSCOPY      COLONOSCOPY  04/10/2017    tubular adenomo polyp , mod. sigmoid diverticulosis, min. int. hemorrhoids    COLONOSCOPY N/A 3/2/2021    COLONOSCOPY WITH BIOPSY performed by Sandee Hewitt MD at [address] [place] [address]  2013    DILATION AND CURETTAGE  1979    EYE SURGERY      laser    INCONTINENCE SURGERY      Percutaneous nerve stimulation Dr Gautam      INSERTABLE CARDIAC MONITOR  2015    [employer] REVEAL LINQ MODEL #[id number] MRI CONDTIONAL OK 3T.  IMMEDIATELY POST IMPLANT    OTHER SURGICAL HISTORY  09/10/15    removal of interstim    UT COLSC FLX W/REMOVAL LESION BY HOT BX FORCEPS N/A 4/10/2017    COLONOSCOPY POLYPECTOMY HOT BIOPSY performed by Deirdre Valentine MD at [address] [place] [address]       TYMPANOSTOMY TUBE PLACEMENT  2017    UPPER GASTROINTESTINAL ENDOSCOPY  2016    Dr Steve Chester N/A 3/2/2021    EGD JLON:[id number]}    Routes of Feeding: {CHP DME Other Feedings:904320290}  Liquids: {Slp liquid thickness:18520}  Daily Fluid Restriction: {CHP DME Yes amt example:247047411}  Last Modified Barium Swallow with Video (Video Swallowing Test): {Done Not Done MAUR:541848911}    Treatments at the Time of Hospital Discharge:   Respiratory Treatments: ***  Oxygen Therapy:  {Therapy; copd oxygen:01680}  Ventilator:    {[facility] Vent IBIE:486816069}    Rehab Therapies: {THERAPEUTIC INTERVENTION:3089272381}  Weight Bearing Status/Restrictions: { CC Weight Bearin}  Other Medical Equipment (for information only, NOT a DME order):  {EQUIPMENT:009305431}  Other Treatments: ***    Patient's personal belongings (please select all that are sent with patient):  {CHP DME Belongings:893089730}    RN SIGNATURE:  {Esignature:890318599}    CASE MANAGEMENT/SOCIAL WORK SECTION    Inpatient Status Date: ***    Readmission Risk Assessment Score:  Readmission Risk              Risk of Unplanned Readmission:  40           Discharging to Facility/ Agency   Name:   Address:  Phone:  Fax:    Dialysis Facility (if applicable)   Name:  Address:  Dialysis Schedule:  Phone:  Fax:    / signature: {Esignature:371120535}    PHYSICIAN SECTION    Prognosis: Fair    Condition at Discharge: Stable    Rehab Potential (if transferring to Rehab): Fair    Recommended Labs or Other Treatments After Discharge: Educate on changing medications, follow-up with PCP and specialists as given    Physician Certification: I certify the above information and transfer of Doretha Lott  is necessary for the continuing treatment of the diagnosis listed and that she requires Home Care for greater 30 days.      Update Admission H&P: No change in H&P    PHYSICIAN SIGNATURE:  Electronically signed by Emory Salinas MD on 23 at 12:58 PM EDT

## 2023-09-09 NOTE — PROGRESS NOTES
CLINICAL PHARMACY NOTE: MEDS TO BEDS    Total # of Prescriptions Filled: 7   The following medications were delivered to the patient:  Azithromycin 250mg  Metoprolol tartrate 25mg  Incruse ellipta 62. 5mcg  Benzonatate 100mg  Prednisone 20mg  Albuterol inhaler  Mucus relief 600mg    Additional Documentation: delivered to patient in room 445 9/9 at 12pm. No co-pay.

## 2023-09-09 NOTE — RT PROTOCOL NOTE
Frequency of every 4 hours PRN for wheezing or increased work of breathing using Per Protocol order mode. 4-6 - enter or revise RT Bronchodilator order(s) to two equivalent RT bronchodilator orders with one order with BID Frequency and one order with Frequency of every 4 hours PRN wheezing or increased work of breathing using Per Protocol order mode. 7-10 - enter or revise RT Bronchodilator order(s) to two equivalent RT bronchodilator orders with one order with TID Frequency and one order with Frequency of every 4 hours PRN wheezing or increased work of breathing using Per Protocol order mode. 11-13 - enter or revise RT Bronchodilator order(s) to one equivalent RT bronchodilator order with QID Frequency and an Albuterol order with Frequency of every 4 hours PRN wheezing or increased work of breathing using Per Protocol order mode. Greater than 13 - enter or revise RT Bronchodilator order(s) to one equivalent RT bronchodilator order with every 4 hours Frequency and an Albuterol order with Frequency of every 2 hours PRN wheezing or increased work of breathing using Per Protocol order mode. RT to enter RT Home Evaluation for COPD & MDI Assessment order using Per Protocol order mode.     Electronically signed by Yolanda Briggs RCP on 9/9/2023 at 10:09 AM

## 2023-09-09 NOTE — DISCHARGE SUMMARY
[email or web address]@    [facility] [address] [place] [address]    Discharge Summary     Patient ID: Clementina Boone  :  1950   MRN: [id number]     ACCOUNT:  [de-identified]   Patient's PCP: VERONIKA Booth CNP  Admit Date: 2023   Discharge Date: 2023   Length of Stay: 4  Code Status:  Full Code  Admitting Physician: No admitting provider for patient encounter. Discharge Physician: Chris Oseguera MD     Active Discharge Diagnoses:     Hospital Problem Lists:  Principal Problem:    COPD exacerbation [facility])  Active Problems:    Leakage of aortic graft (HCC)    Chest pressure    Chronic diastolic congestive heart failure (HCC)    Chronic bilateral low back pain with bilateral sciatica    GERD (gastroesophageal reflux disease)    Chronic nausea    Neuropathy    Obstructive sleep apnea syndrome    Asthma with COPD ([facility])    CKD (chronic kidney disease) stage 3, GFR 30-59 ml/min    Permanent atrial fibrillation ([facility])    Atypical chest pain    Acute respiratory failure with hypoxia and hypercarbia ([facility])    Morbid obesity with BMI of 40.0-44.9, adult ([facility])    Type 2 diabetes mellitus with hyperosmolar nonketotic hyperglycemia ([facility])    Uncontrolled type 2 diabetes mellitus with hyperglycemia ([facility])  Resolved Problems:    * No resolved hospital problems. *      Admission Condition:  stable     Discharged Condition: stable    Hospital Stay:     Hospital Course:    76-year-old female with known history of essential hypertension, A-fib s/p ablation, diabetes mellitus, COPD, multiple strokes who came in with chest pressure and worsening shortness of breath , hyperglycemia on labs, admitted for COPD exacerbation, cardiology consulted for evaluation of chest pain and history of A-fib,  Patient was treated with steroids, antibiotics, bronchodilators, improved significantly.   Blood sugars improved as well  Cardiology evaluated the patient, chest pain likely due to Incruse Ellipta 62.5 MCG/ACT inhaler  Generic drug: umeclidinium bromide  Inhale 1 puff into the lungs daily     metoprolol tartrate 25 MG tablet  Commonly known as: LOPRESSOR  Take 0.5 tablets by mouth 2 times daily     predniSONE 20 MG tablet  Commonly known as: DELTASONE  Take 2 tablets by mouth daily for 2 doses  Start taking on: September 10, 2023           * This list has 1 medication(s) that are the same as other medications prescribed for you. Read the directions carefully, and ask your doctor or other care provider to review them with you. CONTINUE taking these medications      Basaglar KwikPen 100 UNIT/ML injection pen  Generic drug: insulin glargine  Inject 55 Units into the skin 2 times daily     budesonide-formoterol 160-4.5 MCG/ACT Aero  Commonly known as: Symbicort  Inhale 2 puffs into the lungs in the morning and 2 puffs before bedtime. As needed for sob. CertaVite/Antioxidants Tabs  TAKE 1 TABLET BY MOUTH ONCE DAILY     clotrimazole-betamethasone 1-0.05 % cream  Commonly known as: Lotrisone  Apply topically 2 times daily. dicyclomine 10 MG capsule  Commonly known as: BENTYL  TAKE 1 CAPSULE BY MOUTH TWICE A DAY AS NEEDED FOR ABDOMINAL SPASMS     escitalopram 20 MG tablet  Commonly known as: LEXAPRO  TAKE 1 TABLET BY MOUTH IN THE MORNING.      fenofibrate 160 MG tablet  Commonly known as: TRIGLIDE  TAKE 1 TABLET BY MOUTH DAILY     furosemide 20 MG tablet  Commonly known as: LASIX  TAKE 1 TABLET BY MOUTH ONCE DAILY AS NEEDED     Incontinence Supplies Misc  Disposable incontinence liner pad, to change every 4 hours as needed for urinary incontinence     Incontinence Supply Disposable Misc  Disposable justin pad to change 2 times a day     isosorbide dinitrate 30 MG tablet  Commonly known as: ISORDIL  TAKE 1 TABLET BY MOUTH ONCE DAILY     metFORMIN 1000 MG tablet  Commonly known as: GLUCOPHAGE  TAKE 1 TABLET BY MOUTH 2 TIMES DAILY (WITH MEALS)     metoclopramide 10 MG

## 2023-09-09 NOTE — PLAN OF CARE
Problem: Chronic Conditions and Co-morbidities  Goal: Patient's chronic conditions and co-morbidity symptoms are monitored and maintained or improved  9/9/2023 0403 by Armando Dubois RN  Outcome: Progressing  9/8/2023 1820 by Erin Vera RN  Outcome: Progressing     Problem: Discharge Planning  Goal: Discharge to home or other facility with appropriate resources  9/9/2023 0403 by Armando Dubois RN  Outcome: Progressing  9/8/2023 1820 by Erin Vera RN  Outcome: Progressing     Problem: Skin/Tissue Integrity  Goal: Absence of new skin breakdown  Description: 1. Monitor for areas of redness and/or skin breakdown  2. Assess vascular access sites hourly  3. Every 4-6 hours minimum:  Change oxygen saturation probe site  4. Every 4-6 hours:  If on nasal continuous positive airway pressure, respiratory therapy assess nares and determine need for appliance change or resting period.   9/9/2023 0403 by Armando Dubois RN  Outcome: Progressing  9/8/2023 1820 by Erin Vera RN  Outcome: Progressing     Problem: Safety - Adult  Goal: Free from fall injury  9/9/2023 0403 by Armando Dubois RN  Outcome: Epifanio Lipa (Taken 9/8/2023 1925)  Free From Fall Injury: Instruct family/caregiver on patient safety  9/8/2023 1820 by Erin Vera RN  Outcome: Progressing     Problem: Pain  Goal: Verbalizes/displays adequate comfort level or baseline comfort level  9/9/2023 0403 by Armando Dubois RN  Outcome: Progressing  9/8/2023 1820 by Erin Vera RN  Outcome: Progressing     Problem: Respiratory - Adult  Goal: Achieves optimal ventilation and oxygenation  9/9/2023 0403 by Armando Dubois RN  Outcome: Progressing  9/8/2023 1820 by Erin Vera RN  Outcome: Progressing     Problem: ABCDS Injury Assessment  Goal: Absence of physical injury  Outcome: Progressing

## 2023-09-09 NOTE — PLAN OF CARE
Problem: Respiratory - Adult  Goal: Achieves optimal ventilation and oxygenation  9/9/2023 1008 by Roseline Babinski, RCP  Outcome: Progressing   PROVIDE ADEQUATE OXYGENATION WITH ACCEPTABLE SP02/ABG'S    [x]  IDENTIFY APPROPRIATE OXYGEN THERAPY  [x]   MONITOR SP02/ABG'S AS NEEDED   [x]   PATIENT EDUCATION AS NEEDED   BRONCHOSPASM/BRONCHOCONSTRICTION     [x]         IMPROVE AERATION/BREATH SOUNDS  [x]   ADMINISTER BRONCHODILATOR THERAPY AS APPROPRIATE  [x]   ASSESS BREATH SOUNDS  [x]   IMPLEMENT AEROSOL/MDI PROTOCOL  [x]   PATIENT EDUCATION AS NEEDED

## 2023-09-09 NOTE — PLAN OF CARE
Problem: Chronic Conditions and Co-morbidities  Goal: Patient's chronic conditions and co-morbidity symptoms are monitored and maintained or improved  9/9/2023 1429 by Maria Foster RN  Outcome: Adequate for Discharge  9/9/2023 0403 by Siobhan Wu RN  Outcome: Progressing     Problem: Discharge Planning  Goal: Discharge to home or other facility with appropriate resources  9/9/2023 1429 by Maria Foster RN  Outcome: Adequate for Discharge  9/9/2023 0403 by Siobhan Wu RN  Outcome: Progressing     Problem: Skin/Tissue Integrity  Goal: Absence of new skin breakdown  Description: 1. Monitor for areas of redness and/or skin breakdown  2. Assess vascular access sites hourly  3. Every 4-6 hours minimum:  Change oxygen saturation probe site  4. Every 4-6 hours:  If on nasal continuous positive airway pressure, respiratory therapy assess nares and determine need for appliance change or resting period.   9/9/2023 1429 by Maria Foster RN  Outcome: Adequate for Discharge  9/9/2023 0403 by Siobhan Wu RN  Outcome: Progressing     Problem: Safety - Adult  Goal: Free from fall injury  9/9/2023 1429 by Maria Foster RN  Outcome: Adequate for Discharge  9/9/2023 0403 by Siobhan Wu RN  Outcome: Progressing  Flowsheets (Taken 9/8/2023 1925)  Free From Fall Injury: Instruct family/caregiver on patient safety     Problem: Pain  Goal: Verbalizes/displays adequate comfort level or baseline comfort level  9/9/2023 1429 by Maria Foster RN  Outcome: Adequate for Discharge  9/9/2023 0403 by Siobhan Wu RN  Outcome: Progressing     Problem: Respiratory - Adult  Goal: Achieves optimal ventilation and oxygenation  9/9/2023 1429 by Maria Foster RN  Outcome: Adequate for Discharge  9/9/2023 1008 by Spencer Fatima RCP  Outcome: Progressing  9/9/2023 0403 by Siobhan Wu RN  Outcome: Progressing     Problem: ABCDS Injury

## 2023-09-10 LAB
MICROORGANISM SPEC CULT: NORMAL
MICROORGANISM SPEC CULT: NORMAL
SERVICE CMNT-IMP: NORMAL
SERVICE CMNT-IMP: NORMAL
SPECIMEN DESCRIPTION: NORMAL
SPECIMEN DESCRIPTION: NORMAL

## 2023-09-11 ENCOUNTER — CARE COORDINATION (OUTPATIENT)
Dept: CASE MANAGEMENT | Age: 73
End: 2023-09-11

## 2023-09-11 ENCOUNTER — TELEPHONE (OUTPATIENT)
Dept: PRIMARY CARE CLINIC | Age: 73
End: 2023-09-11

## 2023-09-11 NOTE — TELEPHONE ENCOUNTER
Care Transitions Initial Follow Up Call    Outreach made within 2 business days of discharge: Yes    Patient: Moises García Patient : 1950   MRN: [id number]  Reason for Admission: There are no discharge diagnoses documented for the most recent discharge. Discharge Date: 23       Spoke with: Yaz Garza     Discharge department/facility:[facility] Interactive Patient Contact:  Was patient able to fill all prescriptions: Yes  Was patient instructed to bring all medications to the follow-up visit: Yes  Is patient taking all medications as directed in the discharge summary?  Yes  Does patient understand their discharge instructions: Yes  Does patient have questions or concerns that need addressed prior to 7-14 day follow up office visit: yes -     Scheduled appointment with PCP within 7-14 days  Follow Up  Future Appointments   Date Time Provider [address] [place] [address]   2023  2:00 PM VERONIKA Viveros  [facility]   2023 11:45 AM VERONIKA Rosado CNP [facility]   2023  1:00 PM Amy Hernandez, [address], [place]

## 2023-09-11 NOTE — CARE COORDINATION
[facility] Care Transitions Initial Follow Up Call    Call within 2 business days of discharge: Yes      Patient: Ani Bueno Patient : 1950   MRN: [id number]  Reason for Admission: COPD exacerbation  Discharge Date: 23 RARS: Readmission Risk Score: 17.7      Last Discharge [address] [facility] [facility]       Date Complaint Diagnosis Description Type Department Provider    23 Shortness of Breath Leakage of aortic graft, initial encounter ([address]) . .. ED to Hosp-Admission (Discharged) (ADMITTED) 0765 Priyanka Gonzales MD; Heather. .. Was this an external facility discharge? No Discharge Facility: [facility]    Challenges to be reviewed by the provider   Additional needs identified to be addressed with provider: Yes  7 day hospital follow up appointment               Method of communication with provider: staff message. 1st attempt to reach patient for Care Transitions. [facility] requesting return call. Contact information provided.   [phone]      Care Transitions 24 Hour Call    Do you have all of your prescriptions and are they filled?: Yes  Care Transitions Interventions                                     Follow Up  Future Appointments   Date Time Provider [address]   2023  2:00 PM VERONIKA Tomas CNP [facility] Susanne Bang   2023 11:45 AM VERONIKA Fowler CNP [facility]   2023  1:00 PM Gio Bocanegra MD [address], RN

## 2023-09-12 ENCOUNTER — CARE COORDINATION (OUTPATIENT)
Dept: CASE MANAGEMENT | Age: 73
End: 2023-09-12

## 2023-09-12 DIAGNOSIS — E11.42 TYPE 2 DIABETES MELLITUS WITH DIABETIC POLYNEUROPATHY, WITH LONG-TERM CURRENT USE OF INSULIN (HCC): ICD-10-CM

## 2023-09-12 DIAGNOSIS — Z79.4 TYPE 2 DIABETES MELLITUS WITH DIABETIC POLYNEUROPATHY, WITH LONG-TERM CURRENT USE OF INSULIN (HCC): ICD-10-CM

## 2023-09-12 NOTE — CARE COORDINATION
Care Transitions Outreach Attempt #2    Call within 2 business days of discharge: Yes   Attempt #2 to reach patient for transitions of care follow up. Unable to reach patient. Left VM to pt and HIPAA contact/daughter. CTN sign off if no return call received. Noted PCP office contacted pt yesterday, has HFU scheduled. Patient: Palmira Gar Patient : 1950 MRN: [id number]    Last Discharge [address] [facility]       Date Complaint Diagnosis Description Type Department Provider    23 Shortness of Breath Leakage of aortic graft, initial encounter ([facility]) . .. ED to Hosp-Admission (Discharged) (ADMITTED) 8513 Priyanka Atkins MD; Heather. .. Was this an external facility discharge? No Discharge Facility: [facility]    Noted following upcoming appointments from discharge chart review:   [facility] follow up appointment(s):   Future Appointments   Date Time Provider [address]  [facility]   2023 12:20 PM Sobia Perez, APRN - [address]   2023  1:00 PM Maikel Dalal MD [facility]   2023 11:00 AM Karyn Lezama APRN - CNP [facility]  Franky Wade, RN BSN [facility]  Care Transitions Nurse  [phone]   [email or web address]

## 2023-09-13 RX ORDER — SITAGLIPTIN 50 MG/1
TABLET, FILM COATED ORAL
Qty: 30 TABLET | Refills: 2 | Status: SHIPPED | OUTPATIENT
Start: 2023-09-13

## 2023-09-19 ENCOUNTER — HOSPITAL ENCOUNTER (OUTPATIENT)
Dept: PAIN MANAGEMENT | Age: 73
Discharge: HOME | End: 2023-09-19
Payer: MEDICARE

## 2023-09-19 VITALS — WEIGHT: 230 LBS | TEMPERATURE: 97.3 F | RESPIRATION RATE: 20 BRPM | HEIGHT: 62 IN | BODY MASS INDEX: 42.33 KG/M2

## 2023-09-19 DIAGNOSIS — M50.30 DDD (DEGENERATIVE DISC DISEASE), CERVICAL: Chronic | ICD-10-CM

## 2023-09-19 DIAGNOSIS — M46.1 SACROILIITIS, NOT ELSEWHERE CLASSIFIED (HCC): Chronic | ICD-10-CM

## 2023-09-19 DIAGNOSIS — M47.817 LUMBOSACRAL SPONDYLOSIS WITHOUT MYELOPATHY: Chronic | ICD-10-CM

## 2023-09-19 DIAGNOSIS — Z79.891 CHRONIC USE OF OPIATE FOR THERAPEUTIC PURPOSE: Primary | ICD-10-CM

## 2023-09-19 DIAGNOSIS — M54.16 LUMBAR RADICULOPATHY, CHRONIC: ICD-10-CM

## 2023-09-19 PROCEDURE — 99213 OFFICE O/P EST LOW 20 MIN: CPT | Performed by: NURSE PRACTITIONER

## 2023-09-19 PROCEDURE — 99213 OFFICE O/P EST LOW 20 MIN: CPT

## 2023-09-19 RX ORDER — OXYCODONE HYDROCHLORIDE AND ACETAMINOPHEN 5; 325 MG/1; MG/1
1 TABLET ORAL EVERY 8 HOURS PRN
Qty: 90 TABLET | Refills: 0 | Status: SHIPPED | OUTPATIENT
Start: 2023-09-21 | End: 2023-10-21

## 2023-09-19 ASSESSMENT — ENCOUNTER SYMPTOMS
BACK PAIN: 1
COUGH: 0
CONSTIPATION: 0
SHORTNESS OF BREATH: 0
BOWEL INCONTINENCE: 0

## 2023-09-19 NOTE — PROGRESS NOTES
disease), cervical (Chronic)    Relevant Medications    oxyCODONE-acetaminophen (PERCOCET) 5-325 MG per tablet (Start on 9/21/2023)    Lumbosacral spondylosis without myelopathy (Chronic)    Relevant Medications    oxyCODONE-acetaminophen (PERCOCET) 5-325 MG per tablet (Start on 9/21/2023)    Sacroiliitis, not elsewhere classified ([address]) (Chronic)    Relevant Medications    oxyCODONE-acetaminophen (PERCOCET) 5-325 MG per tablet (Start on 9/21/2023)    Lumbar radiculopathy, chronic    Relevant Medications    oxyCODONE-acetaminophen (PERCOCET) 5-325 MG per tablet (Start on 9/21/2023)          Treatment Plan:  Patient relates current medications are helping the pain. Patient reports taking pain medications as prescribed, denies obtaining medications from different sources and denies use of illegal drugs. Medication risk and benefits have been discussed. Patient denies side effects from medications like nausea, vomiting, constipation or drowsiness. Patient reports current activities of daily living are possible due to medications and would like to continue them. As always, we encourage daily stretching and strengthening exercises, and recommend minimizing use of pain medications unless patient cannot get through daily activities due to pain. Due to the high risk nature of this patient's pain medication close monitoring is required. Continue current medication management, pt has been stable and compliant. Script written for percocet  Follow up appointment made for 4 weeks    I have reviewed the chief complaint and history of present illness (including ROS and PFSH) and vital documentation by my staff and I agree with their documentation and have added where applicable.

## 2023-09-22 ENCOUNTER — TELEPHONE (OUTPATIENT)
Dept: DIABETES SERVICES | Age: 73
End: 2023-09-22

## 2023-09-22 ENCOUNTER — TELEPHONE (OUTPATIENT)
Dept: PRIMARY CARE CLINIC | Age: 73
End: 2023-09-22

## 2023-09-22 DIAGNOSIS — E11.42 TYPE 2 DIABETES MELLITUS WITH DIABETIC POLYNEUROPATHY, WITH LONG-TERM CURRENT USE OF INSULIN (HCC): Primary | Chronic | ICD-10-CM

## 2023-09-22 DIAGNOSIS — Z79.4 TYPE 2 DIABETES MELLITUS WITH DIABETIC POLYNEUROPATHY, WITH LONG-TERM CURRENT USE OF INSULIN (HCC): ICD-10-CM

## 2023-09-22 DIAGNOSIS — Z79.4 TYPE 2 DIABETES MELLITUS WITH DIABETIC POLYNEUROPATHY, WITH LONG-TERM CURRENT USE OF INSULIN (HCC): Primary | Chronic | ICD-10-CM

## 2023-09-22 DIAGNOSIS — E11.42 TYPE 2 DIABETES MELLITUS WITH DIABETIC POLYNEUROPATHY, WITH LONG-TERM CURRENT USE OF INSULIN (HCC): ICD-10-CM

## 2023-09-22 RX ORDER — INSULIN GLARGINE 100 [IU]/ML
INJECTION, SOLUTION SUBCUTANEOUS
Refills: 2 | OUTPATIENT
Start: 2023-09-22

## 2023-09-22 NOTE — TELEPHONE ENCOUNTER
Patient was in hospital 9-5/9-9 and in discharge she was told to stop taking carvedilol and juniva and losartan. Patient states she is unsure if she should stop taking them. She has been taking them even thought she was instructed to stop because they are in a bubble pack Her sugars have been in the 200-500 range  she does have a hospital follow up 9/26/23.  Patient wants to know should she stop taking her meds or just continue as shes been until her appt

## 2023-09-22 NOTE — TELEPHONE ENCOUNTER
Pt called into dm ed - requested outpatient appt - can you please place a referral   -- REF 20 - outpatient dmed   She had been given an appt for 9/27/23   Thanks  Ceci Dave RN

## 2023-09-25 ENCOUNTER — OFFICE VISIT (OUTPATIENT)
Age: 73
End: 2023-09-25
Payer: MEDICARE

## 2023-09-25 VITALS
WEIGHT: 230 LBS | DIASTOLIC BLOOD PRESSURE: 94 MMHG | SYSTOLIC BLOOD PRESSURE: 117 MMHG | BODY MASS INDEX: 42.33 KG/M2 | HEIGHT: 62 IN | HEART RATE: 89 BPM

## 2023-09-25 DIAGNOSIS — N20.0 RIGHT KIDNEY STONE: Primary | ICD-10-CM

## 2023-09-25 DIAGNOSIS — N39.41 URGE INCONTINENCE: ICD-10-CM

## 2023-09-25 DIAGNOSIS — N39.41 URGE INCONTINENCE: Primary | ICD-10-CM

## 2023-09-25 DIAGNOSIS — R31.29 MICROHEMATURIA: ICD-10-CM

## 2023-09-25 DIAGNOSIS — N20.0 RIGHT KIDNEY STONE: ICD-10-CM

## 2023-09-25 DIAGNOSIS — R31.0 GROSS HEMATURIA: ICD-10-CM

## 2023-09-25 DIAGNOSIS — R35.1 NOCTURIA: ICD-10-CM

## 2023-09-25 PROBLEM — N28.1 RENAL CYST, LEFT: Status: ACTIVE | Noted: 2023-09-25

## 2023-09-25 LAB
BILIRUBIN, POC: NORMAL
BLOOD URINE, POC: NORMAL
CLARITY, POC: CLEAR
COLOR, POC: YELLOW
GLUCOSE URINE, POC: NORMAL
KETONES, POC: NORMAL
LEUKOCYTE EST, POC: NORMAL
NITRITE, POC: NORMAL
PH, POC: NORMAL
POST VOID RESIDUAL (PVR): NORMAL ML
PROTEIN, POC: NORMAL
SPECIFIC GRAVITY, POC: NORMAL
UROBILINOGEN, POC: NORMAL

## 2023-09-25 PROCEDURE — 1090F PRES/ABSN URINE INCON ASSESS: CPT | Performed by: SPECIALIST

## 2023-09-25 PROCEDURE — 81003 URINALYSIS AUTO W/O SCOPE: CPT | Performed by: SPECIALIST

## 2023-09-25 PROCEDURE — G8428 CUR MEDS NOT DOCUMENT: HCPCS | Performed by: SPECIALIST

## 2023-09-25 PROCEDURE — 3074F SYST BP LT 130 MM HG: CPT | Performed by: SPECIALIST

## 2023-09-25 PROCEDURE — 1036F TOBACCO NON-USER: CPT | Performed by: SPECIALIST

## 2023-09-25 PROCEDURE — G8417 CALC BMI ABV UP PARAM F/U: HCPCS | Performed by: SPECIALIST

## 2023-09-25 PROCEDURE — G8399 PT W/DXA RESULTS DOCUMENT: HCPCS | Performed by: SPECIALIST

## 2023-09-25 PROCEDURE — 3017F COLORECTAL CA SCREEN DOC REV: CPT | Performed by: SPECIALIST

## 2023-09-25 PROCEDURE — 1123F ACP DISCUSS/DSCN MKR DOCD: CPT | Performed by: SPECIALIST

## 2023-09-25 PROCEDURE — 51798 US URINE CAPACITY MEASURE: CPT | Performed by: SPECIALIST

## 2023-09-25 PROCEDURE — 0509F URINE INCON PLAN DOCD: CPT | Performed by: SPECIALIST

## 2023-09-25 PROCEDURE — 3080F DIAST BP >= 90 MM HG: CPT | Performed by: SPECIALIST

## 2023-09-25 PROCEDURE — 1111F DSCHRG MED/CURRENT MED MERGE: CPT | Performed by: SPECIALIST

## 2023-09-25 PROCEDURE — 99204 OFFICE O/P NEW MOD 45 MIN: CPT | Performed by: SPECIALIST

## 2023-09-25 RX ORDER — METHYLPREDNISOLONE 32 MG/1
32 TABLET ORAL PRN
Qty: 2 TABLET | Refills: 0 | Status: SHIPPED | OUTPATIENT
Start: 2023-09-25 | End: 2023-09-27

## 2023-09-25 NOTE — PROGRESS NOTES
[facility] [address] Urology Consultation / New Patient Visit    Patient:  Armando Salas  YOB: 1950  Date: 9/25/2023    HISTORY OF PRESENT ILLNESS:   The patient is a 67 y.o. female who presents today for evaluation of the following problems:   Gross Hematuria:  Patient is here today for gross hematuria which occurred several week(s) ago. Since last office visit the patient's gross hematuria is: gone. Microhematuria? Yes  Previous imaging results:  12/28/22 CT abdomen and pelvis without contrast (stone protocol)   Lower urinary tract symptoms: frequency, hesitancy, and nocturia, 4 times per night   Today's AUA Symptom Score (QOL): 12 (6)  (Patient's old records have been requested, reviewed and summarized in today's note: 7/20/23 office note of VERONIKA Chaney CNP)    Summary of old records:   Urge urinary incontinence: 6 ppd, s/p Interstim 11/15/13 (Jamee Hemp);  Interstim removal 9/10/14; samples of Gemtesa (vibegron) 75 mg po qd 7/25/23  Nocturia x2; VD: FHG=216 mL, TYH=6984 mL, 8 voids/d, KCA=052 mL/void, Nx2, NPi=52%, NUP=94 mL/hr; Discussed Botox-patient reluctant  Left lower pole renal cyst on 6/6/16 renal U/S  Gross hematuria: 12/28/22 CT wo: punctate R KS, needs CT urogram (premedicate with Medrol, Benadryl) and cysto    Procedures Today: Postvoid Residual:  Post-void Residual by bladder scanner: 41 mL (9/25/23)     Urinalysis today:  Results for POC orders placed in visit on 09/25/23   POCT Urinalysis No Micro (Auto)   Result Value Ref Range    Color, UA yellow     Clarity, UA clear     Glucose, UA  mg     Bilirubin, UA      Ketones, UA      Spec Grav, UA      Blood, UA POC trace     pH, UA      Protein, UA POC trace     Urobilinogen, UA      Leukocytes, UA neg     Nitrite, UA neg        Last BUN and creatinine:  Lab Results   Component Value Date    BUN 27 (H) 09/09/2023     Lab Results   Component Value Date    CREATININE 1.0 (H)

## 2023-09-26 ENCOUNTER — OFFICE VISIT (OUTPATIENT)
Dept: PRIMARY CARE CLINIC | Age: 73
End: 2023-09-26
Payer: MEDICARE

## 2023-09-26 VITALS
BODY MASS INDEX: 42.87 KG/M2 | HEART RATE: 77 BPM | SYSTOLIC BLOOD PRESSURE: 136 MMHG | DIASTOLIC BLOOD PRESSURE: 72 MMHG | WEIGHT: 234.4 LBS | OXYGEN SATURATION: 92 %

## 2023-09-26 DIAGNOSIS — I25.83 CORONARY ARTERY DISEASE DUE TO LIPID RICH PLAQUE: ICD-10-CM

## 2023-09-26 DIAGNOSIS — I50.32 CHRONIC DIASTOLIC CONGESTIVE HEART FAILURE (HCC): ICD-10-CM

## 2023-09-26 DIAGNOSIS — I25.10 CORONARY ARTERY DISEASE DUE TO LIPID RICH PLAQUE: ICD-10-CM

## 2023-09-26 DIAGNOSIS — Z09 HOSPITAL DISCHARGE FOLLOW-UP: ICD-10-CM

## 2023-09-26 DIAGNOSIS — Z23 NEED FOR VACCINATION: ICD-10-CM

## 2023-09-26 DIAGNOSIS — J44.1 CHRONIC OBSTRUCTIVE PULMONARY DISEASE WITH ACUTE EXACERBATION (HCC): Primary | ICD-10-CM

## 2023-09-26 PROCEDURE — 1036F TOBACCO NON-USER: CPT | Performed by: NURSE PRACTITIONER

## 2023-09-26 PROCEDURE — G8399 PT W/DXA RESULTS DOCUMENT: HCPCS | Performed by: NURSE PRACTITIONER

## 2023-09-26 PROCEDURE — 3075F SYST BP GE 130 - 139MM HG: CPT | Performed by: NURSE PRACTITIONER

## 2023-09-26 PROCEDURE — G8417 CALC BMI ABV UP PARAM F/U: HCPCS | Performed by: NURSE PRACTITIONER

## 2023-09-26 PROCEDURE — G0008 ADMIN INFLUENZA VIRUS VAC: HCPCS | Performed by: NURSE PRACTITIONER

## 2023-09-26 PROCEDURE — 1111F DSCHRG MED/CURRENT MED MERGE: CPT | Performed by: NURSE PRACTITIONER

## 2023-09-26 PROCEDURE — 3078F DIAST BP <80 MM HG: CPT | Performed by: NURSE PRACTITIONER

## 2023-09-26 PROCEDURE — 1090F PRES/ABSN URINE INCON ASSESS: CPT | Performed by: NURSE PRACTITIONER

## 2023-09-26 PROCEDURE — 90694 VACC AIIV4 NO PRSRV 0.5ML IM: CPT | Performed by: NURSE PRACTITIONER

## 2023-09-26 PROCEDURE — 3017F COLORECTAL CA SCREEN DOC REV: CPT | Performed by: NURSE PRACTITIONER

## 2023-09-26 PROCEDURE — 3023F SPIROM DOC REV: CPT | Performed by: NURSE PRACTITIONER

## 2023-09-26 PROCEDURE — G8427 DOCREV CUR MEDS BY ELIG CLIN: HCPCS | Performed by: NURSE PRACTITIONER

## 2023-09-26 PROCEDURE — 99214 OFFICE O/P EST MOD 30 MIN: CPT | Performed by: NURSE PRACTITIONER

## 2023-09-26 PROCEDURE — 1123F ACP DISCUSS/DSCN MKR DOCD: CPT | Performed by: NURSE PRACTITIONER

## 2023-09-26 RX ORDER — BUDESONIDE AND FORMOTEROL FUMARATE DIHYDRATE 160; 4.5 UG/1; UG/1
2 AEROSOL RESPIRATORY (INHALATION) 2 TIMES DAILY
Qty: 10.2 G | Refills: 2 | Status: SHIPPED | OUTPATIENT
Start: 2023-09-26

## 2023-09-26 NOTE — PATIENT INSTRUCTIONS
Spiritual Care Partner Volunteer visited patient in Neuro on January 31, 2020.     Documented by:     JUN Rosado, [facility], Staff [address]    [address] Paging Service  [phone] Stop Cozaar(losartan) and carvedilol  Should be on Metoprolol instead.     Discuss blood thinner with cardiology, questionable history of atrial fibrillation

## 2023-09-26 NOTE — PROGRESS NOTES
Post-Discharge Transitional Care  Follow Up      Allen Puentes   YOB: 1950    Date of Office Visit:  9/26/2023  Date of Hospital Admission: 9/5/23  Date of Hospital Discharge: 9/9/23  Risk of hospital readmission (high >=14%. Medium >=10%) :Readmission Risk Score: 17.7      Care management risk score Rising risk (score 2-5) and Complex Care (Scores >=6): No Risk Score On File     Non face to face  following discharge, date last encounter closed (first attempt may have been earlier): 09/12/2023    Call initiated 2 business days of discharge: Yes    ASSESSMENT/PLAN:   Chronic obstructive pulmonary disease with acute exacerbation (HCC)  -     budesonide-formoterol (SYMBICORT) 160-4.5 MCG/ACT AERO; Inhale 2 puffs into the lungs 2 times daily As needed for sob, Disp-10.2 g, R-2Normal  Hospital discharge follow-up  -     TX DISCHARGE MEDS RECONCILED W/ CURRENT OUTPATIENT MED LIST  Need for vaccination  -     Influenza, FLUAD, (age 72 y+), IM, Preservative Free, 0.5 mL  Chronic diastolic congestive heart failure ([address])  Coronary artery disease due to lipid rich plaque    To see [facility] on 10/17 at 1:30, discuss possible NOAC with them. Continue Symbicort along with Incruse inhaler for COPD management. Influenza vaccine provided today. Patient did schedule with diabetic in education and is to be seen in the next few weeks. Medical Decision Making: low complexity  Return in about 3 months (around 12/26/2023) for Diabetes, HTN. Subjective:   HPI:  Follow up of Hospital problems/diagnosis(es): Jeffery Luque states she is doing well since discharge, no further issues with shortness of breath or chest tightness. She saw her urologist yesterday and is scheduled for a CT scan. She was told to hold her metformin for 2 days after her scan. She was unsure which medications were stopped during her hospital stay as all her medications are in bubble packs.   She did schedule a

## 2023-09-28 ENCOUNTER — TELEPHONE (OUTPATIENT)
Age: 73
End: 2023-09-28

## 2023-09-28 NOTE — TELEPHONE ENCOUNTER
[facility] called, noticed that patient has an allergy to iodides. Scheduled for tomorrow at 11am. They would like to know if you want to do their allergy prep for this, states it is a 13 hour prep. They were to fax me their protocol but nothing so far received. Their phone number is [phone].

## 2023-09-29 ENCOUNTER — HOSPITAL ENCOUNTER (OUTPATIENT)
Dept: CT IMAGING | Age: 73
End: 2023-09-29
Attending: SPECIALIST
Payer: MEDICARE

## 2023-09-29 ENCOUNTER — HOSPITAL ENCOUNTER (OUTPATIENT)
Age: 73
Discharge: HOME | End: 2023-09-29
Attending: SPECIALIST
Payer: MEDICARE

## 2023-09-29 DIAGNOSIS — R31.0 GROSS HEMATURIA: ICD-10-CM

## 2023-09-29 DIAGNOSIS — N20.0 RIGHT KIDNEY STONE: ICD-10-CM

## 2023-09-29 DIAGNOSIS — N39.41 URGE INCONTINENCE: ICD-10-CM

## 2023-09-29 DIAGNOSIS — R31.29 MICROHEMATURIA: ICD-10-CM

## 2023-09-29 LAB
BUN SERPL-MCNC: 22 MG/DL (ref 8–23)
CREAT SERPL-MCNC: 0.9 MG/DL (ref 0.5–0.9)
GFR SERPL CREATININE-BSD FRML MDRD: >60 ML/MIN/1.73M2

## 2023-09-29 PROCEDURE — 84520 ASSAY OF UREA NITROGEN: CPT

## 2023-09-29 PROCEDURE — 82565 ASSAY OF CREATININE: CPT

## 2023-09-29 PROCEDURE — 36415 COLL VENOUS BLD VENIPUNCTURE: CPT

## 2023-09-29 PROCEDURE — 74178 CT ABD&PLV WO CNTR FLWD CNTR: CPT | Performed by: SPECIALIST

## 2023-09-29 PROCEDURE — 6360000004 HC RX CONTRAST MEDICATION: Performed by: SPECIALIST

## 2023-09-29 RX ADMIN — IOPAMIDOL 130 ML: 755 INJECTION, SOLUTION INTRAVENOUS at 11:49

## 2023-10-06 DIAGNOSIS — Z79.4 TYPE 2 DIABETES MELLITUS WITH DIABETIC POLYNEUROPATHY, WITH LONG-TERM CURRENT USE OF INSULIN (HCC): ICD-10-CM

## 2023-10-06 DIAGNOSIS — E11.42 TYPE 2 DIABETES MELLITUS WITH DIABETIC POLYNEUROPATHY, WITH LONG-TERM CURRENT USE OF INSULIN (HCC): ICD-10-CM

## 2023-10-06 RX ORDER — INSULIN GLARGINE 100 [IU]/ML
INJECTION, SOLUTION SUBCUTANEOUS
Qty: 15 ADJUSTABLE DOSE PRE-FILLED PEN SYRINGE | Refills: 3 | Status: SHIPPED | OUTPATIENT
Start: 2023-10-06 | End: 2023-11-05

## 2023-10-06 RX ORDER — FLURBIPROFEN SODIUM 0.3 MG/ML
SOLUTION/ DROPS OPHTHALMIC
Qty: 100 EACH | Refills: 5 | Status: SHIPPED | OUTPATIENT
Start: 2023-10-06

## 2023-10-09 NOTE — PROGRESS NOTES
Chief Complaint   Patient presents with    Back Pain     Med refill        PMH  Pt c/o low back pain that radiates down legs. MRI 10- with multilevel degenerative changes and Right paracentral disc extrusion L4-5 narrowing the right lateral recess. She has never had a lumbar surgery and not interested in seeing NS for opinion. Patient states that she had a bad experience with an injection in the past and is not interested in any interventional pain procedures      Recent hospitalization for chest pain. She did have stress test and continues to follow with cardiology. Back Pain  This is a chronic problem. The current episode started more than 1 year ago. The problem occurs intermittently. The problem has been waxing and waning since onset. The pain is present in the lumbar spine. Quality: dull. The pain radiates to the left foot and right foot. The pain is at a severity of 6/10. The symptoms are aggravated by bending, standing and twisting. Associated symptoms include bladder incontinence, numbness and tingling. Pertinent negatives include no bowel incontinence, chest pain, fever or weakness. Risk factors include menopause, obesity, poor posture and lack of exercise. She has tried bed rest, heat, ice, NSAIDs and analgesics for the symptoms. Patient denies any new neurological symptoms. No bowel or bladder incontinence, no weakness, and no falling. Pill count: 9 percocet, due 7/19   Topamax      Morphine equivalent: 22.5    Periodic Controlled Substance Monitoring: Possible medication side effects, risk of tolerance/dependence & alternative treatments discussed., No signs of potential drug abuse or diversion identified., Obtaining appropriate analgesic effect of treatment.  Yaa Barron, VERONIKA - CNP)      Past Medical History:   Diagnosis Date    Abdominal bloating 1/26/2021    Adenomatous polyp of ascending colon 1/26/2021    Allergic rhinitis     Anxiety 7/17/2013    Arthritis
Left cephalic 20gauge/yes
of Food in the Last Year: Never true    [address] in the Last Year: Never true   Transportation Needs: Unknown    Lack of Transportation (Medical): Not on file    Lack of Transportation (Non-Medical): No   Physical Activity: Inactive    Days of Exercise per Week: 0 days    Minutes of Exercise per Session: 0 min   Stress: Not on file   Social Connections: Not on file   Intimate Partner Violence: Not on file   Housing Stability: Unknown    Unable to Pay for Housing in the Last Year: Not on file    Number of Places Lived in the Last Year: Not on file    Unstable Housing in the Last Year: No       Review of Systems:  Review of Systems   Cardiovascular:  Negative for chest pain. Musculoskeletal:  Positive for back pain. Gastrointestinal:  Negative for bowel incontinence. Genitourinary:  Positive for bladder incontinence. Neurological:  Positive for numbness and tingling. Negative for weakness. Physical Exam:  LMP  (LMP Unknown)     Physical Exam    Record/Diagnostics Review:    Last greg  and was appropriate     Assessment:  Problem List Items Addressed This Visit    None         Treatment Plan:  Patient relates current medications are helping the pain. Patient reports taking pain medications as prescribed, denies obtaining medications from different sources and denies use of illegal drugs. Medication risk and benefits have been discussed. Patient denies side effects from medications like nausea, vomiting, constipation or drowsiness. Patient reports current activities of daily living are possible due to medications and would like to continue them. As always, we encourage daily stretching and strengthening exercises, and recommend minimizing use of pain medications unless patient cannot get through daily activities due to pain. Due to the high risk nature of this patient's pain medication close monitoring is required.    Continue current medication management, pt has been stable and

## 2023-10-16 ENCOUNTER — HOSPITAL ENCOUNTER (OUTPATIENT)
Dept: DIABETES SERVICES | Age: 73
Setting detail: THERAPIES SERIES
Discharge: HOME | End: 2023-10-16
Payer: MEDICARE

## 2023-10-16 PROCEDURE — G0108 DIAB MANAGE TRN  PER INDIV: HCPCS

## 2023-10-16 SDOH — ECONOMIC STABILITY: FOOD INSECURITY: ADDITIONAL INFORMATION: NO

## 2023-10-16 ASSESSMENT — PROBLEM AREAS IN DIABETES QUESTIONNAIRE (PAID)
WORRYING ABOUT THE FUTURE AND THE POSSIBILITY OF SERIOUS COMPLICATIONS: 2
PAID-5 TOTAL SCORE: 8
FEELING THAT DIABETES IS TAKING UP TOO MUCH OF YOUR MENTAL AND PHYSICAL ENERGY EVERY DAY: 1
FEELING DEPRESSED WHEN YOU THINK ABOUT LIVING WITH DIABETES: 0
COPING WITH COMPLICATIONS OF DIABETES: 4
FEELING SCARED WHEN YOU THINK ABOUT LIVING WITH DIABETES: 1

## 2023-10-16 NOTE — PROGRESS NOTES
Book How to Thrive: A guide for Your Journey with Diabetes ADA booklet -pages 4, 14-19, 22-23        View: Understanding Type 2 Diabetes from Animated Diabetes Patient https://youtu. be/[email or web address]    [] Healthy Eating and Meal planning  How to Thrive: A guide for Your journey with Diabetes - ADA booklet - pages 20- 21 & 42-43  Handouts: Smarter snacking, Lowdown on low - carb diets, Supermarket savvy, Ready, set, start counting, Reading a nutrition fact label, 7 ways to size up your servings    []   Medications and Prevention of Complications      Book How to Thrive: A guide for Your Journey with Diabetes - ADA booklet -  pages 6-7, 12-13, 24-27 & 28 -35  Handouts: Know your numbers, Vaccinations, Type 2 diabetes and the role of GLP- 1, How to care for your teeth and gums, Caring for your feet, How to pick the right shoes  Individualized Diabetes report card     []  Diet Follow Up- CHO counting and  planning for sick days, holiday, vacations   How to Thrive: A guide for Your journey with Diabetes - ADA booklet  - pages 43- 37  Diabetes Food hub www. diabetesfoodhub.org   Handouts: Sick day rules, Dining out guide, Diabetes and alcohol, Traveling with diabetes, Diabetes disaster preparedness plan, Holidays and special occasions                                                            []  Self care and goal review -  3 month follow -    Handouts: AADE7 Self care behaviors work sheets and personal goal setting worksheet review, DSME support options on line     []Self-Management  Gestational - RN class -Resource materials sent out : care booklet - \" Gestational Diabetes Mellitus ( GDM) toolkit form [place] gestational diabetes postpartum care learning collaborative 2019. \"Simple Guidelines for meal planning with gestational diabetes. SMBG sheets to fax back to M weekly. BD  healthy injection site selection and rotation with 6 mm insulin syringe and 4 mm pen needle.  Gestational diabetes handout from [facility]

## 2023-10-17 ENCOUNTER — HOSPITAL ENCOUNTER (OUTPATIENT)
Dept: PAIN MANAGEMENT | Age: 73
Discharge: HOME | End: 2023-10-17
Payer: MEDICARE

## 2023-10-17 DIAGNOSIS — M47.817 LUMBOSACRAL SPONDYLOSIS WITHOUT MYELOPATHY: Chronic | ICD-10-CM

## 2023-10-17 DIAGNOSIS — M51.36 DDD (DEGENERATIVE DISC DISEASE), LUMBAR: ICD-10-CM

## 2023-10-17 DIAGNOSIS — M54.16 LUMBAR RADICULOPATHY, CHRONIC: ICD-10-CM

## 2023-10-17 DIAGNOSIS — M46.1 SACROILIITIS, NOT ELSEWHERE CLASSIFIED (HCC): Chronic | ICD-10-CM

## 2023-10-17 DIAGNOSIS — M54.41 CHRONIC BILATERAL LOW BACK PAIN WITH BILATERAL SCIATICA: Primary | Chronic | ICD-10-CM

## 2023-10-17 DIAGNOSIS — G89.29 CHRONIC BILATERAL LOW BACK PAIN WITH BILATERAL SCIATICA: Primary | Chronic | ICD-10-CM

## 2023-10-17 DIAGNOSIS — M50.30 DDD (DEGENERATIVE DISC DISEASE), CERVICAL: Chronic | ICD-10-CM

## 2023-10-17 DIAGNOSIS — M54.42 CHRONIC BILATERAL LOW BACK PAIN WITH BILATERAL SCIATICA: Primary | Chronic | ICD-10-CM

## 2023-10-17 DIAGNOSIS — Z79.891 CHRONIC USE OF OPIATE FOR THERAPEUTIC PURPOSE: ICD-10-CM

## 2023-10-17 PROCEDURE — G0481 DRUG TEST DEF 8-14 CLASSES: HCPCS

## 2023-10-17 PROCEDURE — 99213 OFFICE O/P EST LOW 20 MIN: CPT

## 2023-10-17 PROCEDURE — 80307 DRUG TEST PRSMV CHEM ANLYZR: CPT

## 2023-10-17 PROCEDURE — 99214 OFFICE O/P EST MOD 30 MIN: CPT | Performed by: NURSE PRACTITIONER

## 2023-10-17 RX ORDER — OXYCODONE HYDROCHLORIDE AND ACETAMINOPHEN 5; 325 MG/1; MG/1
1 TABLET ORAL EVERY 8 HOURS PRN
Qty: 90 TABLET | Refills: 0 | Status: SHIPPED | OUTPATIENT
Start: 2023-10-21 | End: 2023-11-20

## 2023-10-17 ASSESSMENT — ENCOUNTER SYMPTOMS
BOWEL INCONTINENCE: 0
BACK PAIN: 1

## 2023-10-17 NOTE — PROGRESS NOTES
TAKENWITH OR WITHOUT FOOD, Disp: 90 tablet, Rfl: 1    insulin aspart (NOVOLOG FLEXPEN) 100 UNIT/ML injection pen, IF<139 NO INSULIN; 140-199-2 UN;200-249-4 JE;774-798-2 UN;300-349-8 UN;350-400=10 UN;ABOVE 400-12 UNIT(S), Disp: 5 Adjustable Dose Pre-filled Pen Syringe, Rfl: 11    clotrimazole-betamethasone (LOTRISONE) 1-0.05 % cream, Apply topically 2 times daily. , Disp: 45 g, Rfl: 1    ONETOUCH ULTRA strip, TEST TWO (2) TO THREE (3) TIMES A DAY& AS NEEDED, Disp: 100 strip, Rfl: 0    nystatin 688509 UNIT/GM powder, APPLY 3 TIMES DAILY. , Disp: 30 g, Rfl: 3    topiramate (TOPAMAX) 100 MG tablet, Take 1 tablet by mouth nightly, Disp: 90 tablet, Rfl: 2    fenofibrate (TRIGLIDE) 160 MG tablet, TAKE 1 TABLET BY MOUTH DAILY, Disp: 90 tablet, Rfl: 1    ondansetron (ZOFRAN) 4 MG tablet, Take 1 tablet by mouth every 8 hours as needed for Nausea, Disp: 10 tablet, Rfl: 0    atorvastatin (LIPITOR) 40 MG tablet, TAKE 1 TABLET (40 MG TOTAL) BY MOUTH DAILY. , Disp: 30 tablet, Rfl: 6    FEROSUL 325 (65 Fe) MG tablet, TAKE 1 TAB BY MOUTH IN THE MORNING, Disp: 90 tablet, Rfl: 2    isosorbide dinitrate (ISORDIL) 30 MG tablet, TAKE 1 TABLET BY MOUTH ONCE DAILY, Disp: 30 tablet, Rfl: 11    pantoprazole (PROTONIX) 40 MG tablet, TAKE 1 TABLET BY MOUTH TWICE A DAY, Disp: 60 tablet, Rfl: 11    tiZANidine (ZANAFLEX) 2 MG tablet, Take 1 tablet by mouth every 12 hours as needed (spasms), Disp: 20 tablet, Rfl: 0    metoclopramide (REGLAN) 10 MG tablet, Take 1 tablet by mouth 4 times daily WARNING:  May cause drowsiness. May impair ability to operate vehicles or machinery.   Do not use in combination with alcohol., Disp: 20 tablet, Rfl: 0    docusate sodium (COLACE) 100 MG capsule, TAKE 1 CAPSULE BY MOUTH TWICE A DAY, Disp: 60 capsule, Rfl: 2    TRULICITY 1.5 XJ/3.6EG SC injection, INJECT ONE AND A HALF (1 & 1/2) MG INTO THE SKIN ONCE A WEEK STOP 0.75, Disp: 4 Adjustable Dose Pre-filled Pen Syringe, Rfl: 11    vitamin B-12 (CYANOCOBALAMIN) 100 MCG

## 2023-10-20 LAB
6-ACETYLMORPHINE, UR: NOT DETECTED
7-AMINOCLONAZEPAM, URINE: NOT DETECTED
ALPHA-OH-ALPRAZ, URINE: NOT DETECTED
ALPHA-OH-MIDAZOLAM, URINE: NOT DETECTED
ALPRAZOLAM, URINE: NOT DETECTED
AMPHETAMINES, URINE: NOT DETECTED
BARBITURATES, URINE: NOT DETECTED
BENZOYLECGONINE, UR: NOT DETECTED
BUPRENORPHINE URINE: NOT DETECTED
CARISOPRODOL, UR: NOT DETECTED
CLONAZEPAM, URINE: NOT DETECTED
CODEINE, URINE: NOT DETECTED
CREATININE URINE: 46.6 MG/DL (ref 20–400)
DIAZEPAM, URINE: NOT DETECTED
DRUGS EXPECTED, UR: NORMAL
EER HI RES INTERP UR: NORMAL
ETHYL GLUCURONIDE UR: NOT DETECTED
FENTANYL URINE: NOT DETECTED
GABAPENTIN: NOT DETECTED
HYDROCODONE, OPI6M: NOT DETECTED
HYDROMORPHONE, OPI3M: NOT DETECTED
LORAZEPAM, URINE: NOT DETECTED
MARIJUANA METAB, UR: NOT DETECTED
MDA, UR: NOT DETECTED
MDEA, EVE, UR: NOT DETECTED
MDMA URINE: NOT DETECTED
MEPERIDINE METAB, UR: NOT DETECTED
METHADONE, URINE: NOT DETECTED
METHAMPHETAMINE, URINE: NOT DETECTED
METHYLPHENIDATE: NOT DETECTED
MIDAZOLAM, URINE: NOT DETECTED
MORPHINE, OPI1M: NOT DETECTED
NALOXONE URINE: NOT DETECTED
NORBUPRENORPHINE, URINE: NOT DETECTED
NORDIAZEPAM, URINE: NOT DETECTED
NORFENTANYL, URINE: NOT DETECTED
NORHYDROCODONE, URINE: NOT DETECTED
NOROXYCODONE, URINE: NOT DETECTED
NOROXYMORPHONE, URINE: NOT DETECTED
OXAZEPAM, URINE: NOT DETECTED
OXYCODONE URINE: NOT DETECTED
OXYMORPHONE, URINE: NOT DETECTED
PAIN MANAGEMENT DRUG PANEL INTERP, URINE: NORMAL
PAIN MGT DRUG PANEL, HI RES, UR: NORMAL
PCP,URINE: NOT DETECTED
PHENTERMINE, UR: NOT DETECTED
PREGABALIN: NOT DETECTED
TAPENTADOL, URINE: NOT DETECTED
TAPENTADOL-O-SULFATE, URINE: NOT DETECTED
TEMAZEPAM, URINE: NOT DETECTED
TRAMADOL, URINE: NOT DETECTED
ZOLPIDEM METABOLITE (ZCA), URINE: NOT DETECTED
ZOLPIDEM, URINE: NOT DETECTED

## 2023-10-23 ENCOUNTER — APPOINTMENT (OUTPATIENT)
Dept: DIABETES SERVICES | Age: 73
End: 2023-10-23
Payer: MEDICARE

## 2023-10-27 ENCOUNTER — HOSPITAL ENCOUNTER (OUTPATIENT)
Dept: DIABETES SERVICES | Age: 73
Setting detail: THERAPIES SERIES
Discharge: HOME | End: 2023-10-27
Payer: MEDICARE

## 2023-10-27 ENCOUNTER — TELEPHONE (OUTPATIENT)
Dept: PRIMARY CARE CLINIC | Age: 73
End: 2023-10-27

## 2023-10-27 DIAGNOSIS — E11.42 TYPE 2 DIABETES MELLITUS WITH DIABETIC POLYNEUROPATHY, WITH LONG-TERM CURRENT USE OF INSULIN (HCC): Primary | ICD-10-CM

## 2023-10-27 DIAGNOSIS — Z79.4 TYPE 2 DIABETES MELLITUS WITH DIABETIC POLYNEUROPATHY, WITH LONG-TERM CURRENT USE OF INSULIN (HCC): Primary | ICD-10-CM

## 2023-10-27 PROCEDURE — G0108 DIAB MANAGE TRN  PER INDIV: HCPCS

## 2023-10-27 NOTE — PROGRESS NOTES
Diabetes Self- Management Education Program Assessment -   Also see Diabetic Screening  Patient, Yunier Pascual,  here for diabetes self-management education  visit/ assessment. Today's visit was in an individual setting. MEDICAL HISTORY:  Past Medical History:   Diagnosis Date    Abdominal bloating 01/26/2021    Adenomatous polyp of ascending colon 01/26/2021    Allergic rhinitis     Anxiety 07/17/2013    Arthritis     Asthma     Atrial fibrillation ([facility])     Back pain     NERVE/DR. GRACIA    Benign hypertension with CKD (chronic kidney disease) stage III ([facility])     CAD (coronary artery disease) 03/21/2013    Caffeine use     2 coffee/day    CHF (congestive heart failure) ([facility])     Chronic kidney disease     CKD (chronic kidney disease) stage 3, GFR 30-59 ml/min ([facility])     COPD (chronic obstructive pulmonary disease) ([facility])     emphysema    Degeneration of lumbar or lumbosacral intervertebral disc     Depression     DM (diabetes mellitus) ([address])     Emphysema of lung ([facility])     Gastritis     GERD (gastroesophageal reflux disease)     Hearing loss     Hematuria     Hiatal hernia     HTN (hypertension), benign 06/28/2014    Hypertriglyceridemia     Incontinence of urine 09/25/2013    Irritable bowel syndrome with both constipation and diarrhea 01/26/2021    Kidney stone     Knee arthropathy     Lumbosacral spondylosis without myelopathy 09/29/2014    Migraine headache     DR. GRACIA    Mumps     Neuropathy     Obesity     On home oxygen therapy     2 L PER NC  HS AND PRN    HIGINIO on CPAP     Otitis media 01/26/2015    Secondary diabetes mellitus with stage 3 chronic kidney disease (GFR 30-59) ([facility])     Stroke ([facility])      mini stroke.     Tinnitus     Type 2 diabetes mellitus without complication ([facility])     Type II or unspecified type diabetes mellitus without mention of complication, not stated as uncontrolled     UTI (lower urinary tract infection)     Varicose vein      Family History   Problem Relation Age of

## 2023-10-27 NOTE — TELEPHONE ENCOUNTER
Janina Colon from diabetes education stating she is working with patient and she is interested in a freestyle khadra 2 . She uses [employer] .  Patient is requesting is requesting for a khadra 2 reader  and khadra 2 sensor

## 2023-10-30 DIAGNOSIS — N39.3 STRESS INCONTINENCE: ICD-10-CM

## 2023-10-30 DIAGNOSIS — I10 ESSENTIAL HYPERTENSION: ICD-10-CM

## 2023-10-30 DIAGNOSIS — E78.2 MIXED HYPERLIPIDEMIA: ICD-10-CM

## 2023-10-30 DIAGNOSIS — Z79.4 TYPE 2 DIABETES MELLITUS WITH DIABETIC POLYNEUROPATHY, WITH LONG-TERM CURRENT USE OF INSULIN (HCC): ICD-10-CM

## 2023-10-30 DIAGNOSIS — E11.42 TYPE 2 DIABETES MELLITUS WITH DIABETIC POLYNEUROPATHY, WITH LONG-TERM CURRENT USE OF INSULIN (HCC): ICD-10-CM

## 2023-10-30 RX ORDER — FENOFIBRATE 160 MG/1
160 TABLET ORAL DAILY
Qty: 90 TABLET | Refills: 2 | OUTPATIENT
Start: 2023-10-30

## 2023-10-30 RX ORDER — OXYBUTYNIN CHLORIDE 5 MG/1
5 TABLET, EXTENDED RELEASE ORAL DAILY
Qty: 30 TABLET | Refills: 2 | OUTPATIENT
Start: 2023-10-30

## 2023-10-30 RX ORDER — LOSARTAN POTASSIUM 25 MG/1
TABLET ORAL
Qty: 30 TABLET | Refills: 2 | OUTPATIENT
Start: 2023-10-30

## 2023-10-30 RX ORDER — TOPIRAMATE 100 MG/1
100 TABLET, FILM COATED ORAL NIGHTLY
Qty: 90 TABLET | Refills: 2 | OUTPATIENT
Start: 2023-10-30

## 2023-11-01 ENCOUNTER — TELEPHONE (OUTPATIENT)
Dept: PRIMARY CARE CLINIC | Age: 73
End: 2023-11-01

## 2023-11-01 NOTE — TELEPHONE ENCOUNTER
Pt called into the office today. Has been sick for 2 days with a sometimes productive cough, clear phlegm, and a fever that broke last night. Has had diarrhea as well for 2 days. She is asking what she can do from home since she does not have transportation to make it in for an appt. I did offer evisit but she could not due to internet issues. She did say she would go to the ED if necessary but felt that this was not an emergency. Waiting 48+ hours for a cab she had concern with because she did not want her symptoms to worsen.

## 2023-11-01 NOTE — TELEPHONE ENCOUNTER
She can continue to monitor at home but right now if the fever has improved. Unfortunately I do not have anything to offer her to keep her symptoms from worsening as viruses have to run their course. If she is able to get to one of the [address] [place] [address] walk-in clinics today they should be able to test her for [facility]. Given the clear sputum, I have a low suspicion for pneumonia.

## 2023-11-08 DIAGNOSIS — E11.42 TYPE 2 DIABETES MELLITUS WITH DIABETIC POLYNEUROPATHY, WITH LONG-TERM CURRENT USE OF INSULIN (HCC): ICD-10-CM

## 2023-11-08 DIAGNOSIS — Z79.4 TYPE 2 DIABETES MELLITUS WITH DIABETIC POLYNEUROPATHY, WITH LONG-TERM CURRENT USE OF INSULIN (HCC): ICD-10-CM

## 2023-11-08 DIAGNOSIS — E78.2 MIXED HYPERLIPIDEMIA: ICD-10-CM

## 2023-11-08 DIAGNOSIS — I10 ESSENTIAL HYPERTENSION: ICD-10-CM

## 2023-11-08 DIAGNOSIS — N39.3 STRESS INCONTINENCE: ICD-10-CM

## 2023-11-08 RX ORDER — OXYBUTYNIN CHLORIDE 5 MG/1
5 TABLET, EXTENDED RELEASE ORAL DAILY
Qty: 30 TABLET | Refills: 4 | OUTPATIENT
Start: 2023-11-08

## 2023-11-08 RX ORDER — TOPIRAMATE 100 MG/1
100 TABLET, FILM COATED ORAL NIGHTLY
Qty: 90 TABLET | Refills: 2 | OUTPATIENT
Start: 2023-11-08

## 2023-11-08 RX ORDER — FENOFIBRATE 160 MG/1
160 TABLET ORAL DAILY
Qty: 90 TABLET | Refills: 4 | OUTPATIENT
Start: 2023-11-08

## 2023-11-08 RX ORDER — LOSARTAN POTASSIUM 25 MG/1
TABLET ORAL
Qty: 30 TABLET | Refills: 4 | OUTPATIENT
Start: 2023-11-08

## 2023-11-17 DIAGNOSIS — I10 ESSENTIAL HYPERTENSION: ICD-10-CM

## 2023-11-17 DIAGNOSIS — E11.42 TYPE 2 DIABETES MELLITUS WITH DIABETIC POLYNEUROPATHY, WITH LONG-TERM CURRENT USE OF INSULIN (HCC): ICD-10-CM

## 2023-11-17 DIAGNOSIS — Z79.4 TYPE 2 DIABETES MELLITUS WITH DIABETIC POLYNEUROPATHY, WITH LONG-TERM CURRENT USE OF INSULIN (HCC): ICD-10-CM

## 2023-11-17 DIAGNOSIS — E78.2 MIXED HYPERLIPIDEMIA: ICD-10-CM

## 2023-11-17 DIAGNOSIS — N39.3 STRESS INCONTINENCE: ICD-10-CM

## 2023-11-17 RX ORDER — TOPIRAMATE 100 MG/1
100 TABLET, FILM COATED ORAL NIGHTLY
Qty: 90 TABLET | Refills: 2 | OUTPATIENT
Start: 2023-11-17

## 2023-11-17 RX ORDER — LOSARTAN POTASSIUM 25 MG/1
TABLET ORAL
Qty: 30 TABLET | Refills: 2 | OUTPATIENT
Start: 2023-11-17

## 2023-11-17 RX ORDER — OXYBUTYNIN CHLORIDE 5 MG/1
5 TABLET, EXTENDED RELEASE ORAL DAILY
Qty: 30 TABLET | Refills: 2 | OUTPATIENT
Start: 2023-11-17

## 2023-11-17 RX ORDER — FENOFIBRATE 160 MG/1
160 TABLET ORAL DAILY
Qty: 90 TABLET | Refills: 2 | OUTPATIENT
Start: 2023-11-17

## 2023-11-20 ENCOUNTER — HOSPITAL ENCOUNTER (OUTPATIENT)
Dept: PAIN MANAGEMENT | Age: 73
Discharge: HOME | End: 2023-11-20
Payer: MEDICARE

## 2023-11-20 VITALS — DIASTOLIC BLOOD PRESSURE: 83 MMHG | HEART RATE: 84 BPM | OXYGEN SATURATION: 90 % | SYSTOLIC BLOOD PRESSURE: 134 MMHG

## 2023-11-20 DIAGNOSIS — M46.1 SACROILIITIS, NOT ELSEWHERE CLASSIFIED (HCC): Primary | Chronic | ICD-10-CM

## 2023-11-20 DIAGNOSIS — M47.817 LUMBOSACRAL SPONDYLOSIS WITHOUT MYELOPATHY: Chronic | ICD-10-CM

## 2023-11-20 DIAGNOSIS — M50.30 DDD (DEGENERATIVE DISC DISEASE), CERVICAL: Chronic | ICD-10-CM

## 2023-11-20 DIAGNOSIS — Z79.891 CHRONIC USE OF OPIATE FOR THERAPEUTIC PURPOSE: ICD-10-CM

## 2023-11-20 DIAGNOSIS — M54.16 LUMBAR RADICULOPATHY, CHRONIC: ICD-10-CM

## 2023-11-20 PROCEDURE — 99213 OFFICE O/P EST LOW 20 MIN: CPT | Performed by: NURSE PRACTITIONER

## 2023-11-20 PROCEDURE — 99213 OFFICE O/P EST LOW 20 MIN: CPT

## 2023-11-20 RX ORDER — OXYCODONE HYDROCHLORIDE AND ACETAMINOPHEN 5; 325 MG/1; MG/1
1 TABLET ORAL EVERY 8 HOURS PRN
Qty: 90 TABLET | Refills: 0 | Status: SHIPPED | OUTPATIENT
Start: 2023-11-20 | End: 2023-12-20

## 2023-11-20 ASSESSMENT — ENCOUNTER SYMPTOMS
COUGH: 0
BACK PAIN: 1
CONSTIPATION: 0
SHORTNESS OF BREATH: 0
BOWEL INCONTINENCE: 0

## 2023-11-20 ASSESSMENT — PAIN DESCRIPTION - PAIN TYPE: TYPE: CHRONIC PAIN

## 2023-11-20 ASSESSMENT — PAIN DESCRIPTION - DESCRIPTORS: DESCRIPTORS: ACHING

## 2023-11-20 ASSESSMENT — PAIN SCALES - GENERAL: PAINLEVEL_OUTOF10: 7

## 2023-11-20 ASSESSMENT — PAIN DESCRIPTION - LOCATION: LOCATION: BACK

## 2023-11-20 ASSESSMENT — PAIN DESCRIPTION - FREQUENCY: FREQUENCY: INTERMITTENT

## 2023-11-20 NOTE — PROGRESS NOTES
3/2/2021    COLONOSCOPY WITH BIOPSY performed by Geoff Velasquez MD at [address] [place] [address]  9/25/2013    DILATION AND CURETTAGE  1979    EYE SURGERY      laser    INCONTINENCE SURGERY      Percutaneous nerve stimulation Dr Sorto Both Nov 2013     INSERTABLE CARDIAC MONITOR  12/04/2015    [employer] REVEAL LINQ MODEL #[id number] MRI CONDTIONAL OK 3T. IMMEDIATELY POST IMPLANT    OTHER SURGICAL HISTORY  09/10/15    removal of interstim    MI COLSC FLX W/REMOVAL LESION BY HOT BX FORCEPS N/A 4/10/2017    COLONOSCOPY POLYPECTOMY HOT BIOPSY performed by Frank Matt MD at [facility]  08/21/2017    UPPER GASTROINTESTINAL ENDOSCOPY  03/2016    Dr Tatiana Leyden ENDOSCOPY N/A 3/2/2021    EGD BIOPSY performed by Geoff Velasquez MD at [facility]       Allergies   Allergen Reactions    Robitussin [Guaifenesin] Anaphylaxis    Codeine Swelling    Compazine [Prochlorperazine Maleate] Hives and Swelling    Fentanyl     Iodides Itching    Morphine Other (See Comments)     hallucinations    Moxifloxacin      Other reaction(s): Intolerance-unknown  Other reaction(s): Intolerance-unknown    Reglan [Metoclopramide] Nausea Only    Avelox [Moxifloxacin Hcl In Nacl] Rash     Dermatitis      Cipro Xr Rash     dermatitis    Sulfa Antibiotics Rash         Current Outpatient Medications:     Continuous Blood Gluc  (FREESTYLE KHADRA 2 READER) HARMEET, Apply  with khadra sensor daily and as needed, Disp: 1 each, Rfl: 0    Continuous Blood Gluc Sensor (FREESTYLE KHADRA 2 SENSOR) MISC, Apply to arm once every 2 weeks. , Disp: 6 each, Rfl: 5    oxyCODONE-acetaminophen (PERCOCET) 5-325 MG per tablet, Take 1 tablet by mouth every 8 hours as needed for Pain for up to 30 days. Intended supply: 30 days. , Disp: 90 tablet, Rfl: 0    ULTICARE MINI PEN NEEDLES 31G X 6 MM MISC, USE AS DIRECTED, Disp: 100 each, Rfl: 5    LANTUS SOLOSTAR 100

## 2023-11-28 DIAGNOSIS — I10 HTN (HYPERTENSION), BENIGN: Chronic | ICD-10-CM

## 2023-11-28 DIAGNOSIS — E78.2 MIXED HYPERLIPIDEMIA: ICD-10-CM

## 2023-11-28 DIAGNOSIS — E11.42 TYPE 2 DIABETES MELLITUS WITH DIABETIC POLYNEUROPATHY, WITH LONG-TERM CURRENT USE OF INSULIN (HCC): ICD-10-CM

## 2023-11-28 DIAGNOSIS — I10 ESSENTIAL HYPERTENSION: ICD-10-CM

## 2023-11-28 DIAGNOSIS — N39.3 STRESS INCONTINENCE: ICD-10-CM

## 2023-11-28 DIAGNOSIS — Z79.4 TYPE 2 DIABETES MELLITUS WITH DIABETIC POLYNEUROPATHY, WITH LONG-TERM CURRENT USE OF INSULIN (HCC): ICD-10-CM

## 2023-11-28 RX ORDER — LOSARTAN POTASSIUM 25 MG/1
TABLET ORAL
Qty: 30 TABLET | Refills: 2 | OUTPATIENT
Start: 2023-11-28

## 2023-11-28 RX ORDER — TOPIRAMATE 100 MG/1
100 TABLET, FILM COATED ORAL NIGHTLY
Qty: 90 TABLET | Refills: 2 | OUTPATIENT
Start: 2023-11-28

## 2023-11-28 RX ORDER — OXYBUTYNIN CHLORIDE 5 MG/1
5 TABLET, EXTENDED RELEASE ORAL DAILY
Qty: 30 TABLET | Refills: 2 | OUTPATIENT
Start: 2023-11-28

## 2023-11-28 RX ORDER — CARVEDILOL 3.12 MG/1
TABLET ORAL
Qty: 180 TABLET | Refills: 2 | OUTPATIENT
Start: 2023-11-28

## 2023-11-28 RX ORDER — FENOFIBRATE 160 MG/1
160 TABLET ORAL DAILY
Qty: 90 TABLET | Refills: 2 | OUTPATIENT
Start: 2023-11-28

## 2023-12-19 ENCOUNTER — HOSPITAL ENCOUNTER (EMERGENCY)
Age: 73
Discharge: HOME | End: 2023-12-19
Attending: EMERGENCY MEDICINE
Payer: MEDICARE

## 2023-12-19 VITALS
HEIGHT: 62 IN | SYSTOLIC BLOOD PRESSURE: 147 MMHG | WEIGHT: 234 LBS | BODY MASS INDEX: 43.06 KG/M2 | OXYGEN SATURATION: 94 % | TEMPERATURE: 98.7 F | RESPIRATION RATE: 18 BRPM | DIASTOLIC BLOOD PRESSURE: 71 MMHG | HEART RATE: 83 BPM

## 2023-12-19 DIAGNOSIS — B37.2 CANDIDIASIS OF SKIN: ICD-10-CM

## 2023-12-19 DIAGNOSIS — R73.9 HYPERGLYCEMIA: ICD-10-CM

## 2023-12-19 DIAGNOSIS — K08.89 PAIN, DENTAL: Primary | ICD-10-CM

## 2023-12-19 LAB
ALBUMIN SERPL-MCNC: 3.7 G/DL (ref 3.5–5.2)
ALP SERPL-CCNC: 84 U/L (ref 35–104)
ALT SERPL-CCNC: 14 U/L (ref 5–33)
ANION GAP SERPL CALCULATED.3IONS-SCNC: 13 MMOL/L (ref 9–17)
AST SERPL-CCNC: 21 U/L
BASOPHILS # BLD: 0.1 K/UL (ref 0–0.2)
BASOPHILS NFR BLD: 1 % (ref 0–2)
BILIRUB SERPL-MCNC: 0.4 MG/DL (ref 0.3–1.2)
BILIRUB UR QL STRIP: NEGATIVE
BUN SERPL-MCNC: 22 MG/DL (ref 8–23)
CALCIUM SERPL-MCNC: 8.8 MG/DL (ref 8.6–10.4)
CHLORIDE SERPL-SCNC: 91 MMOL/L (ref 98–107)
CLARITY UR: CLEAR
CO2 SERPL-SCNC: 23 MMOL/L (ref 20–31)
COLOR UR: YELLOW
COMMENT: ABNORMAL
CREAT SERPL-MCNC: 1 MG/DL (ref 0.5–0.9)
EOSINOPHIL # BLD: 0.1 K/UL (ref 0–0.4)
EOSINOPHILS RELATIVE PERCENT: 1 % (ref 0–4)
ERYTHROCYTE [DISTWIDTH] IN BLOOD BY AUTOMATED COUNT: 13.6 % (ref 11.5–14.9)
GFR SERPL CREATININE-BSD FRML MDRD: 59 ML/MIN/1.73M2
GLUCOSE BLD-MCNC: 388 MG/DL (ref 65–105)
GLUCOSE BLD-MCNC: 475 MG/DL (ref 65–105)
GLUCOSE BLD-MCNC: 558 MG/DL (ref 65–105)
GLUCOSE SERPL-MCNC: 629 MG/DL (ref 70–99)
GLUCOSE UR STRIP-MCNC: ABNORMAL MG/DL
HCT VFR BLD AUTO: 44 % (ref 36–46)
HGB BLD-MCNC: 14.2 G/DL (ref 12–16)
HGB UR QL STRIP.AUTO: NEGATIVE
KETONES UR STRIP-MCNC: ABNORMAL MG/DL
LEUKOCYTE ESTERASE UR QL STRIP: NEGATIVE
LIPASE SERPL-CCNC: 83 U/L (ref 13–60)
LYMPHOCYTES NFR BLD: 1.2 K/UL (ref 1–4.8)
LYMPHOCYTES RELATIVE PERCENT: 17 % (ref 24–44)
MCH RBC QN AUTO: 31.2 PG (ref 26–34)
MCHC RBC AUTO-ENTMCNC: 32.3 G/DL (ref 31–37)
MCV RBC AUTO: 96.6 FL (ref 80–100)
MONOCYTES NFR BLD: 0.7 K/UL (ref 0.1–1.3)
MONOCYTES NFR BLD: 9 % (ref 1–7)
NEUTROPHILS NFR BLD: 72 % (ref 36–66)
NEUTS SEG NFR BLD: 5 K/UL (ref 1.3–9.1)
NITRITE UR QL STRIP: NEGATIVE
PH UR STRIP: 5 [PH] (ref 5–8)
PLATELET # BLD AUTO: 146 K/UL (ref 150–450)
PMV BLD AUTO: 10.3 FL (ref 6–12)
POTASSIUM SERPL-SCNC: 4.9 MMOL/L (ref 3.7–5.3)
PROT SERPL-MCNC: 6.9 G/DL (ref 6.4–8.3)
PROT UR STRIP-MCNC: NEGATIVE MG/DL
RBC # BLD AUTO: 4.56 M/UL (ref 4–5.2)
SODIUM SERPL-SCNC: 127 MMOL/L (ref 135–144)
SP GR UR STRIP: 1.03 (ref 1–1.03)
UROBILINOGEN UR STRIP-ACNC: NORMAL EU/DL (ref 0–1)
WBC OTHER # BLD: 7 K/UL (ref 3.5–11)

## 2023-12-19 PROCEDURE — 2580000003 HC RX 258: Performed by: PHYSICIAN ASSISTANT

## 2023-12-19 PROCEDURE — 82947 ASSAY GLUCOSE BLOOD QUANT: CPT

## 2023-12-19 PROCEDURE — 83690 ASSAY OF LIPASE: CPT

## 2023-12-19 PROCEDURE — 85025 COMPLETE CBC W/AUTO DIFF WBC: CPT

## 2023-12-19 PROCEDURE — 96374 THER/PROPH/DIAG INJ IV PUSH: CPT

## 2023-12-19 PROCEDURE — 99284 EMERGENCY DEPT VISIT MOD MDM: CPT

## 2023-12-19 PROCEDURE — 80053 COMPREHEN METABOLIC PANEL: CPT

## 2023-12-19 PROCEDURE — 81003 URINALYSIS AUTO W/O SCOPE: CPT

## 2023-12-19 PROCEDURE — 36415 COLL VENOUS BLD VENIPUNCTURE: CPT

## 2023-12-19 PROCEDURE — 6370000000 HC RX 637 (ALT 250 FOR IP): Performed by: PHYSICIAN ASSISTANT

## 2023-12-19 RX ORDER — NYSTATIN 100000 [USP'U]/G
POWDER TOPICAL
Qty: 60 G | Refills: 0 | Status: SHIPPED | OUTPATIENT
Start: 2023-12-19

## 2023-12-19 RX ORDER — 0.9 % SODIUM CHLORIDE 0.9 %
1000 INTRAVENOUS SOLUTION INTRAVENOUS ONCE
Status: COMPLETED | OUTPATIENT
Start: 2023-12-19 | End: 2023-12-19

## 2023-12-19 RX ORDER — PENICILLIN V POTASSIUM 500 MG/1
500 TABLET ORAL 4 TIMES DAILY
Qty: 40 TABLET | Refills: 0 | Status: SHIPPED | OUTPATIENT
Start: 2023-12-19 | End: 2023-12-29

## 2023-12-19 RX ORDER — ONDANSETRON 4 MG/1
4 TABLET, ORALLY DISINTEGRATING ORAL ONCE
Status: COMPLETED | OUTPATIENT
Start: 2023-12-19 | End: 2023-12-19

## 2023-12-19 RX ADMIN — INSULIN HUMAN 10 UNITS: 100 INJECTION, SOLUTION PARENTERAL at 15:39

## 2023-12-19 RX ADMIN — ONDANSETRON 4 MG: 4 TABLET, ORALLY DISINTEGRATING ORAL at 13:32

## 2023-12-19 RX ADMIN — SODIUM CHLORIDE 1000 ML: 9 INJECTION, SOLUTION INTRAVENOUS at 15:30

## 2023-12-19 NOTE — ED PROVIDER NOTES
eMERGENCY dEPARTMENT eNCOUnter   Independent Attestation     Pt Name: Kenji Fairchild  MRN: [id number]  [address] [facility] 1950  Date of evaluation: 12/19/23     Kenji Fairchild is a 67 y.o. female with CC: Dental Pain      Based on the medical record the care appears appropriate. I was personally available for consultation in the Emergency Department.     Marshall Avila DO  Attending Emergency Physician                 Marshall Avila DO  12/19/23 2025

## 2023-12-19 NOTE — ED TRIAGE NOTES
Mode of arrival (squad #, walk in, police, etc) : walk in        Chief complaint(s): Dental pain        Arrival Note (brief scenario, treatment PTA, etc). : Pt reports dental pain and abscess to R side. Pt states she has a dentist she just hasn't made an appointment yet. Her pain management provider advised she come in today for her pain. Pt is A&OX4.        C= \"Have you ever felt that you should Cut down on your drinking? \"  No  A= \"Have people Annoyed you by criticizing your drinking? \"  No  G= \"Have you ever felt bad or Guilty about your drinking? \"  No  E= \"Have you ever had a drink as an Eye-opener first thing in the morning to steady your nerves or to help a hangover? \"  No      Deferred []      Reason for deferring: N/A    *If yes to two or more: probable alcohol abuse. *

## 2023-12-19 NOTE — ED PROVIDER NOTES
EMERGENCY DEPARTMENT ENCOUNTER    Pt Name: Enrike Falcon  MRN: [id number]  [address] 1950  Date of evaluation: 12/19/23  CHIEF COMPLAINT       Chief Complaint   Patient presents with    Dental Pain     HISTORY OF PRESENT ILLNESS   Presents to the ED for evaluation of right sided dental pain, facial swelling, and nausea. Pt states she has had the dental pain x 2 days. Reports the right side of her face was very swollen yesterday but that has gone down. She was at pain management PTA and developed worsening dental pain and nausea so decided to come to the ED. She has not reached out to her dentist.  Pt states it is painful to eat but denies difficulty swallowing. No fevers, chest pain, shortness of breath, emesis. Pt reports chronic abd pain. No changes from baseline. The history is provided by the patient. PASTMEDICAL HISTORY     Past Medical History:   Diagnosis Date    Abdominal bloating 01/26/2021    Adenomatous polyp of ascending colon 01/26/2021    Allergic rhinitis     Anxiety 07/17/2013    Arthritis     Asthma     Atrial fibrillation ([facility])     Back pain     NERVE/DR. GRACIA    Benign hypertension with CKD (chronic kidney disease) stage III ([facility])     CAD (coronary artery disease) 03/21/2013    Caffeine use     2 coffee/day    CHF (congestive heart failure) ([facility])     Chronic kidney disease     CKD (chronic kidney disease) stage 3, GFR 30-59 ml/min ([facility])     COPD (chronic obstructive pulmonary disease) ([facility])     emphysema    Degeneration of lumbar or lumbosacral intervertebral disc     Depression     DM (diabetes mellitus) ([address])     Emphysema of lung (HCC)     Gastritis     GERD (gastroesophageal reflux disease)     Hearing loss     Hematuria     Hiatal hernia     HTN (hypertension), benign 06/28/2014    Hypertriglyceridemia     Incontinence of urine 09/25/2013    Irritable bowel syndrome with both constipation and diarrhea 01/26/2021    Kidney stone     Knee arthropathy     Lumbosacral daily for 10 days     Dispense:  40 tablet     Refill:  0    nystatin (MYCOSTATIN) 958059 UNIT/GM powder     Sig: Apply topically 4 times daily. Dispense:  60 g     Refill:  0     DISCHARGE PRESCRIPTIONS:  Discharge Medication List as of 12/19/2023  6:21 PM        START taking these medications    Details   penicillin v potassium (VEETID) 500 MG tablet Take 1 tablet by mouth 4 times daily for 10 days, Disp-40 tablet, R-0Normal      !! nystatin (MYCOSTATIN) 685313 UNIT/GM powder Apply topically 4 times daily. , Disp-60 g, R-0, Normal       !! - Potential duplicate medications found. Please discuss with provider. PHYSICIAN CONSULTS ORDERED THIS ENCOUNTER:  None  FINAL IMPRESSION      1. Pain, dental    2. Hyperglycemia    3.  Candidiasis of skin          DISPOSITION/PLAN   DISPOSITION Decision To Discharge 12/19/2023 06:19:52 PM      OUTPATIENT FOLLOW UP THE PATIENT:  Kaylin Stevenson, APRN - CNP  [address] ED  [address] [facility] [address]  [address] [place] [address], [place]  12/19/23 2006

## 2023-12-19 NOTE — DISCHARGE INSTRUCTIONS
Please follow up with dentist and PCP. Recommend return to the ED if you develop worsening rash, nausea, vomiting, dental pain, facial swelling, shortness of breath, trouble swallowing, fevers or any other concerning symptoms.

## 2023-12-29 DIAGNOSIS — Z79.4 TYPE 2 DIABETES MELLITUS WITH DIABETIC POLYNEUROPATHY, WITH LONG-TERM CURRENT USE OF INSULIN (HCC): ICD-10-CM

## 2023-12-29 DIAGNOSIS — N39.3 STRESS INCONTINENCE: ICD-10-CM

## 2023-12-29 DIAGNOSIS — F41.9 ANXIETY AND DEPRESSION: ICD-10-CM

## 2023-12-29 DIAGNOSIS — I10 HTN (HYPERTENSION), BENIGN: Chronic | ICD-10-CM

## 2023-12-29 DIAGNOSIS — E78.2 MIXED HYPERLIPIDEMIA: ICD-10-CM

## 2023-12-29 DIAGNOSIS — E11.42 TYPE 2 DIABETES MELLITUS WITH DIABETIC POLYNEUROPATHY, WITH LONG-TERM CURRENT USE OF INSULIN (HCC): ICD-10-CM

## 2023-12-29 DIAGNOSIS — I10 ESSENTIAL HYPERTENSION: ICD-10-CM

## 2023-12-29 DIAGNOSIS — F32.A ANXIETY AND DEPRESSION: ICD-10-CM

## 2023-12-29 RX ORDER — FENOFIBRATE 160 MG/1
160 TABLET ORAL DAILY
Qty: 90 TABLET | Refills: 2 | OUTPATIENT
Start: 2023-12-29

## 2023-12-29 RX ORDER — LOSARTAN POTASSIUM 25 MG/1
TABLET ORAL
Qty: 30 TABLET | Refills: 2 | OUTPATIENT
Start: 2023-12-29

## 2023-12-29 RX ORDER — SITAGLIPTIN 50 MG/1
TABLET, FILM COATED ORAL
Qty: 30 TABLET | Refills: 2 | OUTPATIENT
Start: 2023-12-29

## 2023-12-29 RX ORDER — OXYBUTYNIN CHLORIDE 5 MG/1
5 TABLET, EXTENDED RELEASE ORAL DAILY
Qty: 30 TABLET | Refills: 2 | OUTPATIENT
Start: 2023-12-29

## 2023-12-29 RX ORDER — BLOOD SUGAR DIAGNOSTIC
STRIP MISCELLANEOUS
Qty: 100 EACH | Refills: 0 | Status: SHIPPED | OUTPATIENT
Start: 2023-12-29

## 2023-12-29 RX ORDER — BUPROPION HYDROCHLORIDE 150 MG/1
150 TABLET ORAL EVERY MORNING
Qty: 30 TABLET | Refills: 2 | OUTPATIENT
Start: 2023-12-29

## 2023-12-29 RX ORDER — CARVEDILOL 3.12 MG/1
TABLET ORAL
Qty: 180 TABLET | Refills: 3 | OUTPATIENT
Start: 2023-12-29

## 2023-12-29 RX ORDER — CARVEDILOL 3.12 MG/1
TABLET ORAL
Qty: 180 TABLET | Refills: 2 | OUTPATIENT
Start: 2023-12-29

## 2023-12-29 RX ORDER — FENOFIBRATE 160 MG/1
160 TABLET ORAL DAILY
Qty: 90 TABLET | Refills: 1 | OUTPATIENT
Start: 2023-12-29

## 2024-01-04 DIAGNOSIS — E11.42 TYPE 2 DIABETES MELLITUS WITH DIABETIC POLYNEUROPATHY, WITH LONG-TERM CURRENT USE OF INSULIN (HCC): ICD-10-CM

## 2024-01-04 DIAGNOSIS — I10 ESSENTIAL HYPERTENSION: ICD-10-CM

## 2024-01-04 DIAGNOSIS — Z79.4 TYPE 2 DIABETES MELLITUS WITH DIABETIC POLYNEUROPATHY, WITH LONG-TERM CURRENT USE OF INSULIN (HCC): ICD-10-CM

## 2024-01-04 DIAGNOSIS — N39.3 STRESS INCONTINENCE: ICD-10-CM

## 2024-01-04 RX ORDER — LOSARTAN POTASSIUM 25 MG/1
TABLET ORAL
Qty: 30 TABLET | Refills: 2 | OUTPATIENT
Start: 2024-01-04

## 2024-01-04 RX ORDER — OXYBUTYNIN CHLORIDE 5 MG/1
5 TABLET, EXTENDED RELEASE ORAL DAILY
Qty: 30 TABLET | Refills: 2 | OUTPATIENT
Start: 2024-01-04

## 2024-01-05 ENCOUNTER — ANCILLARY PROCEDURE (OUTPATIENT)
Dept: EMERGENCY DEPT | Age: 74
End: 2024-01-05
Attending: EMERGENCY MEDICINE
Payer: MEDICARE

## 2024-01-05 ENCOUNTER — APPOINTMENT (OUTPATIENT)
Dept: CT IMAGING | Age: 74
End: 2024-01-05
Payer: MEDICARE

## 2024-01-05 ENCOUNTER — HOSPITAL ENCOUNTER (OUTPATIENT)
Age: 74
Setting detail: OBSERVATION
Discharge: HOME | End: 2024-01-07
Attending: EMERGENCY MEDICINE
Payer: MEDICARE

## 2024-01-05 DIAGNOSIS — K04.7 DENTAL ABSCESS: ICD-10-CM

## 2024-01-05 DIAGNOSIS — R55 SYNCOPE AND COLLAPSE: Primary | ICD-10-CM

## 2024-01-05 DIAGNOSIS — R73.9 HYPERGLYCEMIA: ICD-10-CM

## 2024-01-05 DIAGNOSIS — R11.2 NAUSEA AND VOMITING, UNSPECIFIED VOMITING TYPE: ICD-10-CM

## 2024-01-05 LAB
ANION GAP SERPL CALCULATED.3IONS-SCNC: 19 MMOL/L (ref 9–17)
B-OH-BUTYR SERPL-MCNC: 1.83 MMOL/L (ref 0.02–0.27)
BACTERIA URNS QL MICRO: NORMAL
BASOPHILS # BLD: 0.07 K/UL (ref 0–0.2)
BASOPHILS NFR BLD: 1 % (ref 0–2)
BILIRUB UR QL STRIP: NEGATIVE
BNP SERPL-MCNC: 160 PG/ML
BUN BLD-MCNC: 11 MG/DL (ref 8–26)
BUN SERPL-MCNC: 13 MG/DL (ref 8–23)
CA-I BLD-SCNC: 1.21 MMOL/L (ref 1.15–1.33)
CALCIUM SERPL-MCNC: 9.5 MG/DL (ref 8.6–10.4)
CASTS #/AREA URNS LPF: NORMAL /LPF (ref 0–8)
CHLORIDE BLD-SCNC: 97 MMOL/L (ref 98–107)
CHLORIDE SERPL-SCNC: 90 MMOL/L (ref 98–107)
CLARITY UR: CLEAR
CO2 BLD CALC-SCNC: 29 MMOL/L (ref 22–30)
CO2 SERPL-SCNC: 23 MMOL/L (ref 20–31)
COLOR UR: YELLOW
CREAT SERPL-MCNC: 1 MG/DL (ref 0.5–0.9)
EGFR, POC: >60 ML/MIN/1.73M2
EOSINOPHIL # BLD: 0.09 K/UL (ref 0–0.44)
EOSINOPHILS RELATIVE PERCENT: 1 % (ref 1–4)
EPI CELLS #/AREA URNS HPF: NORMAL /HPF (ref 0–5)
ERYTHROCYTE [DISTWIDTH] IN BLOOD BY AUTOMATED COUNT: 12.5 % (ref 11.8–14.4)
FLUAV AG SPEC QL: NEGATIVE
FLUBV AG SPEC QL: NEGATIVE
GFR SERPL CREATININE-BSD FRML MDRD: 59 ML/MIN/1.73M2
GLUCOSE BLD-MCNC: 309 MG/DL (ref 65–105)
GLUCOSE BLD-MCNC: 352 MG/DL (ref 74–100)
GLUCOSE SERPL-MCNC: 444 MG/DL (ref 70–99)
GLUCOSE UR STRIP-MCNC: ABNORMAL MG/DL
HCO3 VENOUS: 29.8 MMOL/L (ref 22–29)
HCT VFR BLD AUTO: 47.1 % (ref 36.3–47.1)
HCT VFR BLD AUTO: 50 % (ref 36–46)
HGB BLD-MCNC: 15.8 G/DL (ref 11.9–15.1)
HGB UR QL STRIP.AUTO: NEGATIVE
IMM GRANULOCYTES # BLD AUTO: 0.04 K/UL (ref 0–0.3)
IMM GRANULOCYTES NFR BLD: 1 %
KETONES UR STRIP-MCNC: ABNORMAL MG/DL
LEUKOCYTE ESTERASE UR QL STRIP: ABNORMAL
LYMPHOCYTES NFR BLD: 1.85 K/UL (ref 1.1–3.7)
LYMPHOCYTES RELATIVE PERCENT: 24 % (ref 24–43)
MCH RBC QN AUTO: 30.6 PG (ref 25.2–33.5)
MCHC RBC AUTO-ENTMCNC: 33.5 G/DL (ref 28.4–34.8)
MCV RBC AUTO: 91.3 FL (ref 82.6–102.9)
MONOCYTES NFR BLD: 0.79 K/UL (ref 0.1–1.2)
MONOCYTES NFR BLD: 10 % (ref 3–12)
NEUTROPHILS NFR BLD: 63 % (ref 36–65)
NEUTS SEG NFR BLD: 4.79 K/UL (ref 1.5–8.1)
NITRITE UR QL STRIP: NEGATIVE
NRBC BLD-RTO: 0 PER 100 WBC
O2 SAT, VEN: 79.3 % (ref 60–85)
PCO2, VEN: 51.2 MM HG (ref 41–51)
PH UR STRIP: 5.5 [PH] (ref 5–8)
PH VENOUS: 7.37 (ref 7.32–7.43)
PLATELET # BLD AUTO: 214 K/UL (ref 138–453)
PMV BLD AUTO: 11.9 FL (ref 8.1–13.5)
PO2, VEN: 45.6 MM HG (ref 30–50)
POC ANION GAP: 10 MMOL/L (ref 7–16)
POC CREATININE: 0.8 MG/DL (ref 0.51–1.19)
POC HEMOGLOBIN (CALC): 17.1 G/DL (ref 12–16)
POC LACTIC ACID: 0.7 MMOL/L (ref 0.56–1.39)
POSITIVE BASE EXCESS, VEN: 3.1 MMOL/L (ref 0–3)
POTASSIUM BLD-SCNC: 4.3 MMOL/L (ref 3.5–4.5)
POTASSIUM SERPL-SCNC: 4.3 MMOL/L (ref 3.7–5.3)
PROT UR STRIP-MCNC: NEGATIVE MG/DL
RBC # BLD AUTO: 5.16 M/UL (ref 3.95–5.11)
RBC #/AREA URNS HPF: NORMAL /HPF (ref 0–4)
SARS-COV-2 RDRP RESP QL NAA+PROBE: NOT DETECTED
SODIUM BLD-SCNC: 135 MMOL/L (ref 138–146)
SODIUM SERPL-SCNC: 132 MMOL/L (ref 135–144)
SP GR UR STRIP: 1.03 (ref 1–1.03)
SPECIMEN DESCRIPTION: NORMAL
TROPONIN I SERPL HS-MCNC: 13 NG/L (ref 0–14)
TROPONIN I SERPL HS-MCNC: 14 NG/L (ref 0–14)
UROBILINOGEN UR STRIP-ACNC: NORMAL EU/DL (ref 0–1)
WBC #/AREA URNS HPF: NORMAL /HPF (ref 0–5)
WBC OTHER # BLD: 7.6 K/UL (ref 3.5–11.3)

## 2024-01-05 PROCEDURE — 80048 BASIC METABOLIC PNL TOTAL CA: CPT

## 2024-01-05 PROCEDURE — 96375 TX/PRO/DX INJ NEW DRUG ADDON: CPT

## 2024-01-05 PROCEDURE — 6370000000 HC RX 637 (ALT 250 FOR IP)

## 2024-01-05 PROCEDURE — 81001 URINALYSIS AUTO W/SCOPE: CPT

## 2024-01-05 PROCEDURE — 84520 ASSAY OF UREA NITROGEN: CPT

## 2024-01-05 PROCEDURE — 85014 HEMATOCRIT: CPT

## 2024-01-05 PROCEDURE — 82010 KETONE BODYS QUAN: CPT

## 2024-01-05 PROCEDURE — 87635 SARS-COV-2 COVID-19 AMP PRB: CPT

## 2024-01-05 PROCEDURE — 96361 HYDRATE IV INFUSION ADD-ON: CPT

## 2024-01-05 PROCEDURE — 93308 TTE F-UP OR LMTD: CPT

## 2024-01-05 PROCEDURE — 2580000003 HC RX 258

## 2024-01-05 PROCEDURE — 87086 URINE CULTURE/COLONY COUNT: CPT

## 2024-01-05 PROCEDURE — 6360000002 HC RX W HCPCS: Performed by: STUDENT IN AN ORGANIZED HEALTH CARE EDUCATION/TRAINING PROGRAM

## 2024-01-05 PROCEDURE — 82803 BLOOD GASES ANY COMBINATION: CPT

## 2024-01-05 PROCEDURE — 2700000000 HC OXYGEN THERAPY PER DAY

## 2024-01-05 PROCEDURE — 83880 ASSAY OF NATRIURETIC PEPTIDE: CPT

## 2024-01-05 PROCEDURE — 84484 ASSAY OF TROPONIN QUANT: CPT

## 2024-01-05 PROCEDURE — 70450 CT HEAD/BRAIN W/O DYE: CPT

## 2024-01-05 PROCEDURE — 87804 INFLUENZA ASSAY W/OPTIC: CPT

## 2024-01-05 PROCEDURE — 83605 ASSAY OF LACTIC ACID: CPT

## 2024-01-05 PROCEDURE — 74174 CTA ABD&PLVS W/CONTRAST: CPT

## 2024-01-05 PROCEDURE — 96376 TX/PRO/DX INJ SAME DRUG ADON: CPT

## 2024-01-05 PROCEDURE — 80051 ELECTROLYTE PANEL: CPT

## 2024-01-05 PROCEDURE — 82330 ASSAY OF CALCIUM: CPT

## 2024-01-05 PROCEDURE — 6370000000 HC RX 637 (ALT 250 FOR IP): Performed by: STUDENT IN AN ORGANIZED HEALTH CARE EDUCATION/TRAINING PROGRAM

## 2024-01-05 PROCEDURE — 94761 N-INVAS EAR/PLS OXIMETRY MLT: CPT

## 2024-01-05 PROCEDURE — 82947 ASSAY GLUCOSE BLOOD QUANT: CPT

## 2024-01-05 PROCEDURE — 82565 ASSAY OF CREATININE: CPT

## 2024-01-05 PROCEDURE — 94664 DEMO&/EVAL PT USE INHALER: CPT

## 2024-01-05 PROCEDURE — 96374 THER/PROPH/DIAG INJ IV PUSH: CPT

## 2024-01-05 PROCEDURE — 6360000002 HC RX W HCPCS

## 2024-01-05 PROCEDURE — 6360000004 HC RX CONTRAST MEDICATION

## 2024-01-05 PROCEDURE — 96372 THER/PROPH/DIAG INJ SC/IM: CPT

## 2024-01-05 PROCEDURE — 85025 COMPLETE CBC W/AUTO DIFF WBC: CPT

## 2024-01-05 PROCEDURE — 99285 EMERGENCY DEPT VISIT HI MDM: CPT

## 2024-01-05 RX ORDER — KETOROLAC TROMETHAMINE 15 MG/ML
15 INJECTION, SOLUTION INTRAMUSCULAR; INTRAVENOUS ONCE
Status: COMPLETED | OUTPATIENT
Start: 2024-01-05 | End: 2024-01-05

## 2024-01-05 RX ORDER — ONDANSETRON 2 MG/ML
4 INJECTION INTRAMUSCULAR; INTRAVENOUS ONCE
Status: COMPLETED | OUTPATIENT
Start: 2024-01-05 | End: 2024-01-05

## 2024-01-05 RX ORDER — PREDNISONE 20 MG/1
50 TABLET ORAL EVERY 6 HOURS
Status: DISCONTINUED | OUTPATIENT
Start: 2024-01-05 | End: 2024-01-05

## 2024-01-05 RX ORDER — SODIUM CHLORIDE, SODIUM LACTATE, POTASSIUM CHLORIDE, AND CALCIUM CHLORIDE .6; .31; .03; .02 G/100ML; G/100ML; G/100ML; G/100ML
1000 INJECTION, SOLUTION INTRAVENOUS ONCE
Status: COMPLETED | OUTPATIENT
Start: 2024-01-05 | End: 2024-01-05

## 2024-01-05 RX ORDER — DIPHENHYDRAMINE HCL 25 MG
50 TABLET ORAL ONCE
Status: DISCONTINUED | OUTPATIENT
Start: 2024-01-05 | End: 2024-01-05

## 2024-01-05 RX ORDER — INSULIN LISPRO 100 [IU]/ML
10 INJECTION, SOLUTION INTRAVENOUS; SUBCUTANEOUS ONCE
Status: COMPLETED | OUTPATIENT
Start: 2024-01-05 | End: 2024-01-05

## 2024-01-05 RX ORDER — METHYLPREDNISOLONE SODIUM SUCCINATE 1 G/16ML
40 INJECTION, POWDER, LYOPHILIZED, FOR SOLUTION INTRAMUSCULAR; INTRAVENOUS EVERY 4 HOURS
Status: COMPLETED | OUTPATIENT
Start: 2024-01-05 | End: 2024-01-05

## 2024-01-05 RX ORDER — DIPHENHYDRAMINE HYDROCHLORIDE 50 MG/ML
50 INJECTION INTRAMUSCULAR; INTRAVENOUS ONCE
Status: COMPLETED | OUTPATIENT
Start: 2024-01-05 | End: 2024-01-05

## 2024-01-05 RX ORDER — DIPHENHYDRAMINE HYDROCHLORIDE 50 MG/ML
50 INJECTION INTRAMUSCULAR; INTRAVENOUS ONCE
Status: DISCONTINUED | OUTPATIENT
Start: 2024-01-05 | End: 2024-01-05

## 2024-01-05 RX ADMIN — METHYLPREDNISOLONE SODIUM SUCCINATE 40 MG: 40 INJECTION, POWDER, LYOPHILIZED, FOR SOLUTION INTRAMUSCULAR; INTRAVENOUS at 17:45

## 2024-01-05 RX ADMIN — KETOROLAC TROMETHAMINE 15 MG: 15 INJECTION, SOLUTION INTRAMUSCULAR; INTRAVENOUS at 23:51

## 2024-01-05 RX ADMIN — BENZOCAINE 1 EACH: 220 GEL, DENTIFRICE DENTAL at 17:45

## 2024-01-05 RX ADMIN — INSULIN LISPRO 10 UNITS: 100 INJECTION, SOLUTION INTRAVENOUS; SUBCUTANEOUS at 23:29

## 2024-01-05 RX ADMIN — INSULIN LISPRO 10 UNITS: 100 INJECTION, SOLUTION INTRAVENOUS; SUBCUTANEOUS at 19:02

## 2024-01-05 RX ADMIN — METHYLPREDNISOLONE SODIUM SUCCINATE 40 MG: 40 INJECTION, POWDER, LYOPHILIZED, FOR SOLUTION INTRAMUSCULAR; INTRAVENOUS at 21:44

## 2024-01-05 RX ADMIN — ONDANSETRON 4 MG: 2 INJECTION INTRAMUSCULAR; INTRAVENOUS at 17:46

## 2024-01-05 RX ADMIN — SODIUM CHLORIDE, POTASSIUM CHLORIDE, SODIUM LACTATE AND CALCIUM CHLORIDE 1000 ML: 600; 310; 30; 20 INJECTION, SOLUTION INTRAVENOUS at 17:46

## 2024-01-05 RX ADMIN — IOPAMIDOL 100 ML: 755 INJECTION, SOLUTION INTRAVENOUS at 22:10

## 2024-01-05 RX ADMIN — DIPHENHYDRAMINE HYDROCHLORIDE 50 MG: 50 INJECTION INTRAMUSCULAR; INTRAVENOUS at 20:45

## 2024-01-05 ASSESSMENT — ENCOUNTER SYMPTOMS
CONSTIPATION: 0
COUGH: 1
ABDOMINAL PAIN: 0
DIARRHEA: 0
VOMITING: 1
NAUSEA: 1
SHORTNESS OF BREATH: 1
RHINORRHEA: 1

## 2024-01-05 ASSESSMENT — PAIN - FUNCTIONAL ASSESSMENT: PAIN_FUNCTIONAL_ASSESSMENT: NONE - DENIES PAIN

## 2024-01-05 ASSESSMENT — PAIN SCALES - GENERAL: PAINLEVEL_OUTOF10: 9

## 2024-01-05 NOTE — ED NOTES
The following labs were labeled with appropriate pt sticker and tubed to lab:     [] Blue     [] Lavender   [] on ice  [] Green/yellow  [] Green/black [] on ice  [] Grey  [] on ice  [] Yellow  [] Red  [] Pink  [] Type/ Screen  [] ABG  [] VBG    [] COVID-19 swab    [] Rapid  [] PCR  [] Flu swab  [] Peds Viral Panel     [x] Urine Sample  [] Fecal Sample  [] Pelvic Cultures  [] Blood Cultures  [] X 2  [] STREP Cultures  [] Wound Cultures

## 2024-01-05 NOTE — ED NOTES
Pt presents to the ER via triage in wheelchair. Pt able to pivot to bed from wheelchair. Pt c/o dizziness for past 2 days. Pt states she was in bathroom when she had syncopal episode. Pt c/o Nausea pre and post syncopal episode. Pt c/o slight shortness of breath and chest heaviness. Pt states having dental abscess past month and was on abx. Pt otherwise A&O x4, in NAD, VSS. Pt placed on beside cardiac monitoring, EKG taken, line established, labs drawn. Call light within reach.

## 2024-01-06 PROBLEM — R79.89 PSEUDOHYPONATREMIA: Status: ACTIVE | Noted: 2024-01-06

## 2024-01-06 PROBLEM — R55 SYNCOPE AND COLLAPSE: Status: ACTIVE | Noted: 2024-01-06

## 2024-01-06 PROBLEM — K04.7 DENTAL INFECTION: Status: ACTIVE | Noted: 2024-01-06

## 2024-01-06 PROBLEM — R11.2 NAUSEA WITH VOMITING: Status: ACTIVE | Noted: 2024-01-06

## 2024-01-06 LAB
ANION GAP SERPL CALCULATED.3IONS-SCNC: 16 MMOL/L (ref 9–17)
BUN SERPL-MCNC: 22 MG/DL (ref 8–23)
CALCIUM SERPL-MCNC: 8.6 MG/DL (ref 8.6–10.4)
CHLORIDE SERPL-SCNC: 90 MMOL/L (ref 98–107)
CO2 SERPL-SCNC: 21 MMOL/L (ref 20–31)
CREAT SERPL-MCNC: 1.1 MG/DL (ref 0.5–0.9)
EST. AVERAGE GLUCOSE BLD GHB EST-MCNC: 375 MG/DL
GFR SERPL CREATININE-BSD FRML MDRD: 53 ML/MIN/1.73M2
GLUCOSE BLD-MCNC: 232 MG/DL (ref 65–105)
GLUCOSE BLD-MCNC: 247 MG/DL (ref 65–105)
GLUCOSE BLD-MCNC: 264 MG/DL (ref 65–105)
GLUCOSE BLD-MCNC: 293 MG/DL (ref 65–105)
GLUCOSE BLD-MCNC: 309 MG/DL (ref 65–105)
GLUCOSE BLD-MCNC: 401 MG/DL (ref 65–105)
GLUCOSE BLD-MCNC: 528 MG/DL (ref 65–105)
GLUCOSE BLD-MCNC: 529 MG/DL (ref 65–105)
GLUCOSE BLD-MCNC: 558 MG/DL (ref 65–105)
GLUCOSE SERPL-MCNC: 556 MG/DL (ref 70–99)
HBA1C MFR BLD: 14.7 % (ref 4–6)
MICROORGANISM SPEC CULT: ABNORMAL
POTASSIUM SERPL-SCNC: 4.7 MMOL/L (ref 3.7–5.3)
SODIUM SERPL-SCNC: 127 MMOL/L (ref 135–144)
SPECIMEN DESCRIPTION: ABNORMAL
TROPONIN I SERPL HS-MCNC: 11 NG/L (ref 0–14)
TSH SERPL DL<=0.05 MIU/L-ACNC: 0.61 UIU/ML (ref 0.3–5)

## 2024-01-06 PROCEDURE — 36415 COLL VENOUS BLD VENIPUNCTURE: CPT

## 2024-01-06 PROCEDURE — 96372 THER/PROPH/DIAG INJ SC/IM: CPT

## 2024-01-06 PROCEDURE — 84484 ASSAY OF TROPONIN QUANT: CPT

## 2024-01-06 PROCEDURE — 6360000002 HC RX W HCPCS: Performed by: NURSE PRACTITIONER

## 2024-01-06 PROCEDURE — 97162 PT EVAL MOD COMPLEX 30 MIN: CPT

## 2024-01-06 PROCEDURE — 97530 THERAPEUTIC ACTIVITIES: CPT

## 2024-01-06 PROCEDURE — 2580000003 HC RX 258: Performed by: NURSE PRACTITIONER

## 2024-01-06 PROCEDURE — 6370000000 HC RX 637 (ALT 250 FOR IP): Performed by: NURSE PRACTITIONER

## 2024-01-06 PROCEDURE — 82947 ASSAY GLUCOSE BLOOD QUANT: CPT

## 2024-01-06 PROCEDURE — 96361 HYDRATE IV INFUSION ADD-ON: CPT

## 2024-01-06 PROCEDURE — 84443 ASSAY THYROID STIM HORMONE: CPT

## 2024-01-06 PROCEDURE — 94660 CPAP INITIATION&MGMT: CPT

## 2024-01-06 PROCEDURE — G0378 HOSPITAL OBSERVATION PER HR: HCPCS

## 2024-01-06 PROCEDURE — 94640 AIRWAY INHALATION TREATMENT: CPT

## 2024-01-06 PROCEDURE — 83036 HEMOGLOBIN GLYCOSYLATED A1C: CPT

## 2024-01-06 PROCEDURE — 93005 ELECTROCARDIOGRAM TRACING: CPT | Performed by: NURSE PRACTITIONER

## 2024-01-06 PROCEDURE — 80048 BASIC METABOLIC PNL TOTAL CA: CPT

## 2024-01-06 PROCEDURE — 99222 1ST HOSP IP/OBS MODERATE 55: CPT | Performed by: NURSE PRACTITIONER

## 2024-01-06 RX ORDER — MAGNESIUM SULFATE 1 G/100ML
1000 INJECTION INTRAVENOUS PRN
Status: DISCONTINUED | OUTPATIENT
Start: 2024-01-06 | End: 2024-01-07 | Stop reason: HOSPADM

## 2024-01-06 RX ORDER — INSULIN GLARGINE 100 [IU]/ML
55 INJECTION, SOLUTION SUBCUTANEOUS 2 TIMES DAILY
Status: DISCONTINUED | OUTPATIENT
Start: 2024-01-06 | End: 2024-01-07 | Stop reason: HOSPADM

## 2024-01-06 RX ORDER — CLINDAMYCIN HYDROCHLORIDE 150 MG/1
300 CAPSULE ORAL EVERY 6 HOURS SCHEDULED
Status: DISCONTINUED | OUTPATIENT
Start: 2024-01-06 | End: 2024-01-07 | Stop reason: HOSPADM

## 2024-01-06 RX ORDER — TOPIRAMATE 100 MG/1
100 TABLET, FILM COATED ORAL NIGHTLY
Status: DISCONTINUED | OUTPATIENT
Start: 2024-01-06 | End: 2024-01-07 | Stop reason: HOSPADM

## 2024-01-06 RX ORDER — ENOXAPARIN SODIUM 100 MG/ML
30 INJECTION SUBCUTANEOUS 2 TIMES DAILY
Status: DISCONTINUED | OUTPATIENT
Start: 2024-01-06 | End: 2024-01-07 | Stop reason: HOSPADM

## 2024-01-06 RX ORDER — CHOLECALCIFEROL (VITAMIN D3) 125 MCG
10 CAPSULE ORAL NIGHTLY
Status: DISCONTINUED | OUTPATIENT
Start: 2024-01-06 | End: 2024-01-07 | Stop reason: HOSPADM

## 2024-01-06 RX ORDER — SODIUM CHLORIDE 9 MG/ML
INJECTION, SOLUTION INTRAVENOUS CONTINUOUS
Status: DISCONTINUED | OUTPATIENT
Start: 2024-01-06 | End: 2024-01-07 | Stop reason: HOSPADM

## 2024-01-06 RX ORDER — DEXTROSE MONOHYDRATE 100 MG/ML
INJECTION, SOLUTION INTRAVENOUS CONTINUOUS PRN
Status: DISCONTINUED | OUTPATIENT
Start: 2024-01-06 | End: 2024-01-07 | Stop reason: HOSPADM

## 2024-01-06 RX ORDER — ISOSORBIDE DINITRATE 10 MG/1
30 TABLET ORAL DAILY
Status: DISCONTINUED | OUTPATIENT
Start: 2024-01-06 | End: 2024-01-07 | Stop reason: HOSPADM

## 2024-01-06 RX ORDER — CYCLOBENZAPRINE HCL 10 MG
5 TABLET ORAL ONCE
Status: COMPLETED | OUTPATIENT
Start: 2024-01-06 | End: 2024-01-06

## 2024-01-06 RX ORDER — POTASSIUM CHLORIDE 20 MEQ/1
40 TABLET, EXTENDED RELEASE ORAL PRN
Status: DISCONTINUED | OUTPATIENT
Start: 2024-01-06 | End: 2024-01-07 | Stop reason: HOSPADM

## 2024-01-06 RX ORDER — DICYCLOMINE HYDROCHLORIDE 10 MG/1
10 CAPSULE ORAL 2 TIMES DAILY PRN
Status: DISCONTINUED | OUTPATIENT
Start: 2024-01-06 | End: 2024-01-07 | Stop reason: HOSPADM

## 2024-01-06 RX ORDER — LANOLIN ALCOHOL/MO/W.PET/CERES
325 CREAM (GRAM) TOPICAL
Status: DISCONTINUED | OUTPATIENT
Start: 2024-01-06 | End: 2024-01-07 | Stop reason: HOSPADM

## 2024-01-06 RX ORDER — INSULIN LISPRO 100 [IU]/ML
0-4 INJECTION, SOLUTION INTRAVENOUS; SUBCUTANEOUS NIGHTLY
Status: DISCONTINUED | OUTPATIENT
Start: 2024-01-06 | End: 2024-01-07 | Stop reason: HOSPADM

## 2024-01-06 RX ORDER — INSULIN LISPRO 100 [IU]/ML
0-16 INJECTION, SOLUTION INTRAVENOUS; SUBCUTANEOUS
Status: DISCONTINUED | OUTPATIENT
Start: 2024-01-06 | End: 2024-01-07 | Stop reason: HOSPADM

## 2024-01-06 RX ORDER — PREDNISONE 20 MG/1
20 TABLET ORAL DAILY
Status: DISCONTINUED | OUTPATIENT
Start: 2024-01-06 | End: 2024-01-07 | Stop reason: HOSPADM

## 2024-01-06 RX ORDER — UBIDECARENONE 75 MG
50 CAPSULE ORAL DAILY
Status: DISCONTINUED | OUTPATIENT
Start: 2024-01-06 | End: 2024-01-07 | Stop reason: HOSPADM

## 2024-01-06 RX ORDER — ALBUTEROL SULFATE 2.5 MG/3ML
2.5 SOLUTION RESPIRATORY (INHALATION) EVERY 6 HOURS PRN
Status: DISCONTINUED | OUTPATIENT
Start: 2024-01-06 | End: 2024-01-07 | Stop reason: HOSPADM

## 2024-01-06 RX ORDER — ALOGLIPTIN 12.5 MG/1
12.5 TABLET, FILM COATED ORAL DAILY
Status: DISCONTINUED | OUTPATIENT
Start: 2024-01-06 | End: 2024-01-07 | Stop reason: HOSPADM

## 2024-01-06 RX ORDER — ACETAMINOPHEN 650 MG/1
650 SUPPOSITORY RECTAL EVERY 6 HOURS PRN
Status: DISCONTINUED | OUTPATIENT
Start: 2024-01-06 | End: 2024-01-07 | Stop reason: HOSPADM

## 2024-01-06 RX ORDER — POLYETHYLENE GLYCOL 3350 17 G/17G
17 POWDER, FOR SOLUTION ORAL DAILY PRN
Status: DISCONTINUED | OUTPATIENT
Start: 2024-01-06 | End: 2024-01-07 | Stop reason: HOSPADM

## 2024-01-06 RX ORDER — M-VIT,TX,IRON,MINS/CALC/FOLIC 27MG-0.4MG
1 TABLET ORAL DAILY
Status: DISCONTINUED | OUTPATIENT
Start: 2024-01-06 | End: 2024-01-07 | Stop reason: HOSPADM

## 2024-01-06 RX ORDER — ONDANSETRON 2 MG/ML
4 INJECTION INTRAMUSCULAR; INTRAVENOUS EVERY 6 HOURS PRN
Status: DISCONTINUED | OUTPATIENT
Start: 2024-01-06 | End: 2024-01-07 | Stop reason: HOSPADM

## 2024-01-06 RX ORDER — ONDANSETRON 4 MG/1
4 TABLET, ORALLY DISINTEGRATING ORAL EVERY 8 HOURS PRN
Status: DISCONTINUED | OUTPATIENT
Start: 2024-01-06 | End: 2024-01-07 | Stop reason: HOSPADM

## 2024-01-06 RX ORDER — OXYBUTYNIN CHLORIDE 5 MG/1
5 TABLET, EXTENDED RELEASE ORAL DAILY
Status: DISCONTINUED | OUTPATIENT
Start: 2024-01-06 | End: 2024-01-07 | Stop reason: HOSPADM

## 2024-01-06 RX ORDER — SODIUM CHLORIDE 9 MG/ML
INJECTION, SOLUTION INTRAVENOUS PRN
Status: DISCONTINUED | OUTPATIENT
Start: 2024-01-06 | End: 2024-01-07 | Stop reason: HOSPADM

## 2024-01-06 RX ORDER — AMOXICILLIN AND CLAVULANATE POTASSIUM 875; 125 MG/1; MG/1
1 TABLET, FILM COATED ORAL EVERY 12 HOURS SCHEDULED
Status: DISCONTINUED | OUTPATIENT
Start: 2024-01-06 | End: 2024-01-06

## 2024-01-06 RX ORDER — INSULIN LISPRO 100 [IU]/ML
25 INJECTION, SOLUTION INTRAVENOUS; SUBCUTANEOUS ONCE
Status: COMPLETED | OUTPATIENT
Start: 2024-01-06 | End: 2024-01-06

## 2024-01-06 RX ORDER — LANOLIN ALCOHOL/MO/W.PET/CERES
400 CREAM (GRAM) TOPICAL 2 TIMES DAILY
Status: DISCONTINUED | OUTPATIENT
Start: 2024-01-06 | End: 2024-01-07 | Stop reason: HOSPADM

## 2024-01-06 RX ORDER — ACETAMINOPHEN 325 MG/1
650 TABLET ORAL EVERY 6 HOURS PRN
Status: DISCONTINUED | OUTPATIENT
Start: 2024-01-06 | End: 2024-01-07 | Stop reason: HOSPADM

## 2024-01-06 RX ORDER — INSULIN LISPRO 100 [IU]/ML
10 INJECTION, SOLUTION INTRAVENOUS; SUBCUTANEOUS ONCE
Status: COMPLETED | OUTPATIENT
Start: 2024-01-06 | End: 2024-01-06

## 2024-01-06 RX ORDER — OXYCODONE HYDROCHLORIDE AND ACETAMINOPHEN 5; 325 MG/1; MG/1
1 TABLET ORAL EVERY 8 HOURS PRN
Status: DISCONTINUED | OUTPATIENT
Start: 2024-01-06 | End: 2024-01-07 | Stop reason: HOSPADM

## 2024-01-06 RX ORDER — INSULIN LISPRO 100 [IU]/ML
20 INJECTION, SOLUTION INTRAVENOUS; SUBCUTANEOUS ONCE
Status: COMPLETED | OUTPATIENT
Start: 2024-01-06 | End: 2024-01-06

## 2024-01-06 RX ORDER — ATORVASTATIN CALCIUM 40 MG/1
40 TABLET, FILM COATED ORAL DAILY
Status: DISCONTINUED | OUTPATIENT
Start: 2024-01-06 | End: 2024-01-07 | Stop reason: HOSPADM

## 2024-01-06 RX ORDER — SODIUM CHLORIDE 0.9 % (FLUSH) 0.9 %
5-40 SYRINGE (ML) INJECTION EVERY 12 HOURS SCHEDULED
Status: DISCONTINUED | OUTPATIENT
Start: 2024-01-06 | End: 2024-01-07 | Stop reason: HOSPADM

## 2024-01-06 RX ORDER — POTASSIUM CHLORIDE 7.45 MG/ML
10 INJECTION INTRAVENOUS PRN
Status: DISCONTINUED | OUTPATIENT
Start: 2024-01-06 | End: 2024-01-07 | Stop reason: HOSPADM

## 2024-01-06 RX ORDER — DOCUSATE SODIUM 100 MG/1
100 CAPSULE, LIQUID FILLED ORAL 2 TIMES DAILY
Status: DISCONTINUED | OUTPATIENT
Start: 2024-01-06 | End: 2024-01-07 | Stop reason: HOSPADM

## 2024-01-06 RX ORDER — SODIUM CHLORIDE 0.9 % (FLUSH) 0.9 %
10 SYRINGE (ML) INJECTION PRN
Status: DISCONTINUED | OUTPATIENT
Start: 2024-01-06 | End: 2024-01-07 | Stop reason: HOSPADM

## 2024-01-06 RX ORDER — BUDESONIDE AND FORMOTEROL FUMARATE DIHYDRATE 160; 4.5 UG/1; UG/1
2 AEROSOL RESPIRATORY (INHALATION) 2 TIMES DAILY
Status: DISCONTINUED | OUTPATIENT
Start: 2024-01-06 | End: 2024-01-07 | Stop reason: HOSPADM

## 2024-01-06 RX ORDER — PANTOPRAZOLE SODIUM 40 MG/1
40 TABLET, DELAYED RELEASE ORAL 2 TIMES DAILY
Status: DISCONTINUED | OUTPATIENT
Start: 2024-01-06 | End: 2024-01-07 | Stop reason: HOSPADM

## 2024-01-06 RX ADMIN — SODIUM CHLORIDE, PRESERVATIVE FREE 10 ML: 5 INJECTION INTRAVENOUS at 19:49

## 2024-01-06 RX ADMIN — BUDESONIDE AND FORMOTEROL FUMARATE DIHYDRATE 2 PUFF: 160; 4.5 AEROSOL RESPIRATORY (INHALATION) at 20:22

## 2024-01-06 RX ADMIN — INSULIN LISPRO 4 UNITS: 100 INJECTION, SOLUTION INTRAVENOUS; SUBCUTANEOUS at 17:11

## 2024-01-06 RX ADMIN — CYCLOBENZAPRINE 5 MG: 10 TABLET, FILM COATED ORAL at 20:08

## 2024-01-06 RX ADMIN — Medication 1 TABLET: at 09:03

## 2024-01-06 RX ADMIN — CLINDAMYCIN HYDROCHLORIDE 300 MG: 150 CAPSULE ORAL at 12:24

## 2024-01-06 RX ADMIN — OXYCODONE HYDROCHLORIDE AND ACETAMINOPHEN 1 TABLET: 5; 325 TABLET ORAL at 16:11

## 2024-01-06 RX ADMIN — Medication 50 MCG: at 09:03

## 2024-01-06 RX ADMIN — OXYBUTYNIN CHLORIDE 5 MG: 5 TABLET, EXTENDED RELEASE ORAL at 09:03

## 2024-01-06 RX ADMIN — ISOSORBIDE DINITRATE 30 MG: 10 TABLET ORAL at 09:06

## 2024-01-06 RX ADMIN — MAGNESIUM GLUCONATE 500 MG ORAL TABLET 400 MG: 500 TABLET ORAL at 09:07

## 2024-01-06 RX ADMIN — FERROUS SULFATE TAB EC 325 MG (65 MG FE EQUIVALENT) 325 MG: 325 (65 FE) TABLET DELAYED RESPONSE at 09:06

## 2024-01-06 RX ADMIN — DICYCLOMINE HYDROCHLORIDE 10 MG: 10 CAPSULE ORAL at 20:10

## 2024-01-06 RX ADMIN — DOCUSATE SODIUM 100 MG: 100 CAPSULE, LIQUID FILLED ORAL at 09:06

## 2024-01-06 RX ADMIN — PREDNISONE 20 MG: 20 TABLET ORAL at 10:24

## 2024-01-06 RX ADMIN — INSULIN LISPRO 25 UNITS: 100 INJECTION, SOLUTION INTRAVENOUS; SUBCUTANEOUS at 12:21

## 2024-01-06 RX ADMIN — PANTOPRAZOLE SODIUM 40 MG: 40 TABLET, DELAYED RELEASE ORAL at 09:03

## 2024-01-06 RX ADMIN — Medication 10 MG: at 22:20

## 2024-01-06 RX ADMIN — SODIUM CHLORIDE: 9 INJECTION, SOLUTION INTRAVENOUS at 01:19

## 2024-01-06 RX ADMIN — ENOXAPARIN SODIUM 30 MG: 100 INJECTION SUBCUTANEOUS at 08:14

## 2024-01-06 RX ADMIN — ALOGLIPTIN 12.5 MG: 12.5 TABLET, FILM COATED ORAL at 09:04

## 2024-01-06 RX ADMIN — INSULIN LISPRO 16 UNITS: 100 INJECTION, SOLUTION INTRAVENOUS; SUBCUTANEOUS at 08:09

## 2024-01-06 RX ADMIN — INSULIN GLARGINE 55 UNITS: 100 INJECTION, SOLUTION SUBCUTANEOUS at 20:11

## 2024-01-06 RX ADMIN — SODIUM CHLORIDE, PRESERVATIVE FREE 10 ML: 5 INJECTION INTRAVENOUS at 08:14

## 2024-01-06 RX ADMIN — INSULIN GLARGINE 55 UNITS: 100 INJECTION, SOLUTION SUBCUTANEOUS at 09:10

## 2024-01-06 RX ADMIN — METOPROLOL TARTRATE 12.5 MG: 25 TABLET, FILM COATED ORAL at 09:07

## 2024-01-06 RX ADMIN — TOPIRAMATE 100 MG: 100 TABLET, FILM COATED ORAL at 20:16

## 2024-01-06 RX ADMIN — INSULIN LISPRO 10 UNITS: 100 INJECTION, SOLUTION INTRAVENOUS; SUBCUTANEOUS at 09:10

## 2024-01-06 RX ADMIN — ATORVASTATIN CALCIUM 40 MG: 40 TABLET, FILM COATED ORAL at 09:05

## 2024-01-06 RX ADMIN — DOCUSATE SODIUM 100 MG: 100 CAPSULE, LIQUID FILLED ORAL at 20:10

## 2024-01-06 RX ADMIN — ACETAMINOPHEN 650 MG: 325 TABLET ORAL at 03:44

## 2024-01-06 RX ADMIN — PANTOPRAZOLE SODIUM 40 MG: 40 TABLET, DELAYED RELEASE ORAL at 20:10

## 2024-01-06 RX ADMIN — ENOXAPARIN SODIUM 30 MG: 100 INJECTION SUBCUTANEOUS at 20:10

## 2024-01-06 RX ADMIN — INSULIN LISPRO 16 UNITS: 100 INJECTION, SOLUTION INTRAVENOUS; SUBCUTANEOUS at 12:24

## 2024-01-06 RX ADMIN — CLINDAMYCIN HYDROCHLORIDE 300 MG: 150 CAPSULE ORAL at 19:07

## 2024-01-06 RX ADMIN — INSULIN LISPRO 20 UNITS: 100 INJECTION, SOLUTION INTRAVENOUS; SUBCUTANEOUS at 14:29

## 2024-01-06 RX ADMIN — MAGNESIUM GLUCONATE 500 MG ORAL TABLET 400 MG: 500 TABLET ORAL at 20:10

## 2024-01-06 ASSESSMENT — PAIN SCALES - GENERAL
PAINLEVEL_OUTOF10: 7
PAINLEVEL_OUTOF10: 6
PAINLEVEL_OUTOF10: 7
PAINLEVEL_OUTOF10: 0
PAINLEVEL_OUTOF10: 7
PAINLEVEL_OUTOF10: 6
PAINLEVEL_OUTOF10: 7
PAINLEVEL_OUTOF10: 0
PAINLEVEL_OUTOF10: 7

## 2024-01-06 ASSESSMENT — PAIN SCALES - WONG BAKER
WONGBAKER_NUMERICALRESPONSE: 0
WONGBAKER_NUMERICALRESPONSE: 4
WONGBAKER_NUMERICALRESPONSE: 0

## 2024-01-06 ASSESSMENT — PAIN DESCRIPTION - ORIENTATION
ORIENTATION: LEFT
ORIENTATION: RIGHT;LEFT

## 2024-01-06 ASSESSMENT — PAIN DESCRIPTION - DESCRIPTORS
DESCRIPTORS: DULL
DESCRIPTORS: PRESSURE;TIGHTNESS

## 2024-01-06 ASSESSMENT — PAIN - FUNCTIONAL ASSESSMENT
PAIN_FUNCTIONAL_ASSESSMENT: PREVENTS OR INTERFERES SOME ACTIVE ACTIVITIES AND ADLS
PAIN_FUNCTIONAL_ASSESSMENT: ACTIVITIES ARE NOT PREVENTED

## 2024-01-06 ASSESSMENT — PAIN DESCRIPTION - ONSET
ONSET: GRADUAL
ONSET: ON-GOING

## 2024-01-06 ASSESSMENT — PAIN DESCRIPTION - LOCATION
LOCATION: CHEST
LOCATION: CHEST
LOCATION: CHEST;TEETH
LOCATION: LEG;CHEST

## 2024-01-06 ASSESSMENT — PAIN DESCRIPTION - DIRECTION
RADIATING_TOWARDS: DENIES
RADIATING_TOWARDS: DENIES

## 2024-01-06 ASSESSMENT — PAIN DESCRIPTION - FREQUENCY
FREQUENCY: INTERMITTENT
FREQUENCY: INTERMITTENT

## 2024-01-06 ASSESSMENT — PAIN DESCRIPTION - PAIN TYPE
TYPE: ACUTE PAIN
TYPE: ACUTE PAIN

## 2024-01-06 NOTE — ED NOTES
The following labs were labeled with appropriate pt sticker and tubed to lab:     [] Blue     [] Lavender   [] on ice  [] Green/yellow  [] Green/black [] on ice  [] Grey  [] on ice  [] Yellow  [] Red  [] Pink  [] Type/ Screen  [] ABG  [] VBG    [x] COVID-19 swab    [x] Rapid  [] PCR  [x] Flu swab  [] Peds Viral Panel     [] Urine Sample  [] Fecal Sample  [] Pelvic Cultures  [] Blood Cultures  [] X 2  [] STREP Cultures  [] Wound Cultures

## 2024-01-06 NOTE — ED NOTES
ED to inpatient nurses report      Chief Complaint:  Chief Complaint   Patient presents with    Loss of Consciousness     States fell and landed on walker. Denies hitting head     Dental Injury    Abdominal Pain    Emesis     Present to ED from: Pt presents to the ER via triage in wheelchair. Pt able to pivot to bed from wheelchair. Pt c/o dizziness for past 2 days. Pt states she was in bathroom when she had syncopal episode. Pt c/o Nausea pre and post syncopal episode. Pt c/o slight shortness of breath and chest heaviness. Pt states having dental abscess past month and was on abx. Pt otherwise A&O x4, in NAD, VSS. Pt placed on beside cardiac monitoring, EKG taken, line established, labs drawn. Call light within reach.     MOA:     LOC: alert and orientated to name, place, date  Mobility: Requires assistance * 1  Oxygen Baseline: 94%    Current needs required: Risk for falls   Pending ED orders: Admit orders  Present condition: Stable    Why did the patient come to the ED? +LOC; Abd pain with emesis  What is the plan? Admit  Any procedures or intervention occur? Labs and imaging  Any safety concerns?? No    Mental Status:  Level of Consciousness: Alert (0)    Psych Assessment:   Psychosocial  Psychosocial (WDL): Within Defined Limits  Vital signs   Vitals:    01/05/24 1956 01/05/24 2130 01/05/24 2135 01/05/24 2145   BP:  110/83     Pulse: 89 86 82 83   Resp: 18 18 20 15   Temp:       SpO2: 93%  91% 93%   Weight:    106.1 kg (234 lb)        Vitals:  Patient Vitals for the past 24 hrs:   BP Temp Pulse Resp SpO2 Weight   01/05/24 2145 -- -- 83 15 93 % 106.1 kg (234 lb)   01/05/24 2135 -- -- 82 20 91 % --   01/05/24 2130 110/83 -- 86 18 -- --   01/05/24 1956 -- -- 89 18 93 % --   01/05/24 1951 -- -- 84 27 93 % --   01/05/24 1946 (!) 149/56 -- 86 21 -- --   01/05/24 1931 (!) 138/110 -- 83 25 90 % --   01/05/24 1926 -- -- 87 19 93 % --   01/05/24 1605 (!) 142/80 -- 92 25 94 % --   01/05/24 1600 -- -- 89 19 94 % --

## 2024-01-06 NOTE — ED NOTES
The following labs were labeled with appropriate pt sticker and tubed to lab:     [] Blue     [] Lavender   [] on ice  [x] Green/yellow  [] Green/black [] on ice  [] Grey  [] on ice  [] Yellow  [] Red  [] Pink  [] Type/ Screen  [] ABG  [] VBG    [] COVID-19 swab    [] Rapid  [] PCR  [] Flu swab  [] Peds Viral Panel     [] Urine Sample  [] Fecal Sample  [] Pelvic Cultures  [] Blood Cultures  [] X 2  [] STREP Cultures  [] Wound Cultures

## 2024-01-06 NOTE — H&P
[facility]  Office: [phone]  Goldy Moore DO, Ferdinand Villeda DO, Jose Goff DO, Alejo Mao DO, Mainor Smith MD, Melony Rodriguez MD, Tonia Brenner MD, Alethea Ramsey MD,  Rahul Zheng MD, Josi Saldana MD, Pedro Deras MD,  Lauren Acosta DO, Randee Pantoja MD, Raji Reddy MD, Praveen Moore DO, Veronica Duff MD,  Spencer Ferguson DO, Olivia Boss MD, Veronika Lin MD, Caryl Zacarias MD, Slick Jean MD,  Tacos Yang MD, Guzman Stein MD, Vicente Yee MD, Mickey Avila MD, Conrad Bailey MD, Raisa Felder MD, Travon Blanchard DO, Joel Rome DO, Mariel Francisco MD,  Mika Valentin MD, Shirley Waterhouse, CNP,  Isamar Seals CNP, Jose Warren, CNP,  Jenna Angulo, NAKUL, Jenanette Bobo, CNP, Sofya Rose, CNP, Zoe Coates CNP, Lara Yan CNP, Michelle García CNP, Tamara Issa PA-C, Halina Lynch PA-C, Keri Weaver, CNP, Cheyanne White, CNS, Heide Collier, CNP, Amber Mast CNP, Tracy Schwab, CNP         [facility]   IN-PATIENT SERVICE   [facility]    HISTORY AND PHYSICAL EXAMINATION            Date:   1/6/2024  Patient name:  Mariangel Abreu  Date of admission:  1/5/2024  3:47 PM  MRN:   [id number]  Account:  [id number]  YOB: 1950  PCP:    Carla Chua APRN - CNP  Room:   OBS 26/26-1  Code Status:    Full Code    Chief Complaint:     Chief Complaint   Patient presents with    Loss of Consciousness     States fell and landed on walker. Denies hitting head     Dental Injury    Abdominal Pain    Emesis       History Obtained From:     patient, electronic medical record    History of Present Illness:     Mariangel Abreu is a 73 y.o. Non- / non  female who presents with Loss of Consciousness (States fell and landed on walker. Denies hitting head ), Dental Injury, Abdominal Pain, and Emesis   and is admitted to the hospital for the management of Syncope and

## 2024-01-06 NOTE — ED PROVIDER NOTES
[facility] ED  Emergency Department  Emergency Medicine Resident Sign-out     Care of Mariangel Abreu was assumed from Dr. Trotter and is being seen for Loss of Consciousness (States fell and landed on walker. Denies hitting head ), Dental Injury, Abdominal Pain, and Emesis  .  The patient's initial evaluation and plan have been discussed with the prior provider who initially evaluated the patient.     EMERGENCY DEPARTMENT COURSE / MEDICAL DECISION MAKING:       MEDICATIONS GIVEN:  Orders Placed This Encounter   Medications    benzocaine (LOLLICAINE) 20 % dental swab    ondansetron (ZOFRAN) injection 4 mg    lactated ringers bolus bolus 1,000 mL    DISCONTD: diphenhydrAMINE (BENADRYL) tablet 50 mg    DISCONTD: predniSONE (DELTASONE) tablet 50 mg    DISCONTD: methylPREDNISolone sodium (PF) (SOLU-MEDROL PF) injection 40 mg    DISCONTD: diphenhydrAMINE (BENADRYL) injection 50 mg    methylPREDNISolone sodium (SOLU-MEDROL) injection 40 mg    diphenhydrAMINE (BENADRYL) injection 50 mg    insulin lispro (HUMALOG) injection vial 10 Units    iopamidol (ISOVUE-370) 76 % injection 100 mL    insulin lispro (HUMALOG) injection vial 10 Units    ketorolac (TORADOL) injection 15 mg    0.9 % sodium chloride infusion       LABS / RADIOLOGY:     Labs Reviewed   BASIC METABOLIC PANEL - Abnormal; Notable for the following components:       Result Value    Sodium 132 (*)     Chloride 90 (*)     Anion Gap 19 (*)     Glucose 444 (*)     Creatinine 1.0 (*)     Est, Glom Filt Rate 59 (*)     All other components within normal limits   CBC WITH AUTO DIFFERENTIAL - Abnormal; Notable for the following components:    RBC 5.16 (*)     Hemoglobin 15.8 (*)     Immature Granulocytes 1 (*)     All other components within normal limits   URINALYSIS - Abnormal; Notable for the following components:    Glucose, Ur 3+ (*)     Ketones, Urine SMALL (*)     Specific Gravity, UA 1.034 (*)     Leukocyte Esterase, Urine TRACE (*)  
       [facility] ED  Emergency Department Encounter  Emergency Medicine Resident     Pt Name:Mariangel Abreu  MRN: [id number]  Birthdate 1950  Date of evaluation: 1/5/24  PCP:  Carla Chua APRN - CNP  Note Started: 3:53 PM EST      CHIEF COMPLAINT       Chief Complaint   Patient presents with    Loss of Consciousness     States fell and landed on walker. Denies hitting head     Dental Injury    Abdominal Pain    Emesis       HISTORY OF PRESENT ILLNESS  (Location/Symptom, Timing/Onset, Context/Setting, Quality, Duration, Modifying Factors, Severity.)      Mariangel Abreu is a 73 y.o. female who presents after a syncopal episode.  Patient reports she got up to walk to the bathroom around 1 PM and got dizzy, steady herself against the wall and the next thing she knew she was slumped over her walker.  Patient does not know if she hit her head.  She has had episodes of dizziness before but has never syncopized.  Patient reports 2 to 3 days of nausea, vomiting.  Was seen 12/19 and prescribed penicillin for a dental abscess which she associates with the nausea, vomiting therefore was cutting them in half and then fourths to try to counteract to the nausea.  Patient also reports that she had a UTI.  Endorses dysuria.  Reports cough, shortness of breath, occasional chest pain.  Denies abdominal pain.    On chart review, patient's urine with glucose and ketones, however no signs of infection.    PAST MEDICAL / SURGICAL / SOCIAL / FAMILY HISTORY      has a past medical history of Abdominal bloating, Adenomatous polyp of ascending colon, Allergic rhinitis, Anxiety, Arthritis, Asthma, Atrial fibrillation ([facility]), Back pain, Benign hypertension with CKD (chronic kidney disease) stage III ([facility]), CAD (coronary artery disease), Caffeine use, CHF (congestive heart failure) ([facility]), Chronic kidney disease, CKD (chronic kidney disease) stage 3, GFR 30-59 ml/min ([facility]), COPD (chronic obstructive pulmonary 
    -------------------------  BP: 139/83, Temp: 97.1 °F (36.2 °C), Pulse: 89, Respirations: 19  Physical Exam  Constitutional:       Appearance: She is well-developed. She is not diaphoretic.   HENT:      Head: Normocephalic and atraumatic.      Right Ear: External ear normal.      Left Ear: External ear normal.   Eyes:      General: No scleral icterus.        Right eye: No discharge.         Left eye: No discharge.   Neck:      Trachea: No tracheal deviation.   Pulmonary:      Effort: Pulmonary effort is normal. No respiratory distress.      Breath sounds: No stridor.   Musculoskeletal:         General: Normal range of motion.      Cervical back: Normal range of motion.   Skin:     General: Skin is warm and dry.   Neurological:      Mental Status: She is alert and oriented to person, place, and time.      Coordination: Coordination normal.   Psychiatric:         Behavior: Behavior normal.           Comments  Medical Decision Making  Amount and/or Complexity of Data Reviewed  Labs: ordered. Decision-making details documented in ED Course.  Radiology: ordered.  ECG/medicine tests: ordered.    Risk  OTC drugs.  Prescription drug management.    CT will be needed for evaluation of the abdomen patient is allergic though given the multiple medical comorbidities and chest pain will need ACS evaluation    ED Course as of 01/05/24 2247 Fri Jan 05, 2024   1546 Zofran, benzocaine, LR, Solu-Medrol [AS]   1652 EKG Interpretation   Interpreted by Liang Kee DO    Clinical Impression: Left axis deviation T wave inversions in the lateral leads low voltage EKG poor R wave progression normal conduction [WK]   1719 CBC with Auto Differential(!):    WBC 7.6   RBC 5.16(!)   Hemoglobin Quant 15.8(!)   Hematocrit 47.1   MCV 91.3   MCH 30.6   MCHC 33.5   RDW 12.5   Platelet Count 214   MPV 11.9   NRBC Automated 0.0   Neutrophils % 63   Lymphocyte % 24   Monocytes % 10   Eosinophils % 1   Basophils % 1   Immature Granulocytes 1(!) 
volume loss. Ventricular enlargement concordant with the degree of parenchymal volume loss. Scattered patchy foci of low attenuation are present within supratentorial white matter which is a nonspecific finding but likely represents mild chronic microvascular ischemia.  Remote lacunar infarcts throughout the bilateral basal ganglia. ORBITS: The visualized portion of the orbits demonstrate no acute abnormality. SINUSES:  The visualized paranasal sinuses and mastoid air cells demonstrate no acute abnormality. SOFT TISSUES/SKULL: No acute abnormality of the visualized skull or soft tissues.     No acute intracranial abnormality.     POC US [employer]    Result Date: 1/5/2024  POCUS_Cardiac     Exam Information:          Exam type:  Clinically indicated     Indication(s) for Exam:          The exam was performed with the following indications::  Concern for effusion     Views Obtained & Images Saved for These Views:          The pericardial sac, myocardium, 4 chambers, and IVC were identified using the following views::          Parasternal long-axis         Parasternal short-axis     Findings:          Pericardial Effusion:  No pericardial effusion     Interpretation:          Other::  No Effusion     Confirmatory study:          What confirmatory study was done?:  Not applicable  Electronically signed by Liang Kee on Friday, January 5, 2024 at 9:38 PM : Malu Trotter Attending: Liang Kee       RECENT VITALS:     Temp: 97.1 °F (36.2 °C),  Pulse: 85, Respirations: 15, BP: 138/82, SpO2: 94 %    Mariangel Abreu is a 73 y.o. female who presents with complaint of loss of consciousness.  Subsequent fall.  CT imaging shows no evidence of trauma, CTA does not show obvious aortic dysfunction.  Plan is admission for high risk syncope    OUTSTANDING TASKS / RECOMMENDATIONS:    1. Disposition made by previous attending and nothing to do      Roc Parks MD, FACEP, FAAEM  Attending

## 2024-01-06 NOTE — PLAN OF CARE
Problem: Chronic Conditions and Co-morbidities  Goal: Patient's chronic conditions and co-morbidity symptoms are monitored and maintained or improved  Outcome: Progressing  Flowsheets (Taken 1/6/2024 0238)  Care Plan - Patient's Chronic Conditions and Co-Morbidity Symptoms are Monitored and Maintained or Improved: Monitor and assess patient's chronic conditions and comorbid symptoms for stability, deterioration, or improvement     Problem: Discharge Planning  Goal: Discharge to home or other facility with appropriate resources  Outcome: Progressing  Flowsheets (Taken 1/6/2024 0238)  Discharge to home or other facility with appropriate resources: Identify barriers to discharge with patient and caregiver     Problem: Pain  Goal: Verbalizes/displays adequate comfort level or baseline comfort level  Outcome: Progressing

## 2024-01-06 NOTE — ED NOTES
Perfect serve Dr. Yan for Cipap Machine order. RT was informed for the machine. Awaiting response.

## 2024-01-07 VITALS
BODY MASS INDEX: 42.23 KG/M2 | TEMPERATURE: 97.3 F | HEIGHT: 62 IN | OXYGEN SATURATION: 96 % | DIASTOLIC BLOOD PRESSURE: 67 MMHG | HEART RATE: 71 BPM | RESPIRATION RATE: 18 BRPM | WEIGHT: 229.5 LBS | SYSTOLIC BLOOD PRESSURE: 121 MMHG

## 2024-01-07 PROBLEM — R11.2 NAUSEA WITH VOMITING: Status: RESOLVED | Noted: 2024-01-06 | Resolved: 2024-01-07

## 2024-01-07 PROBLEM — R79.89 PSEUDOHYPONATREMIA: Status: RESOLVED | Noted: 2024-01-06 | Resolved: 2024-01-07

## 2024-01-07 PROBLEM — R55 SYNCOPE AND COLLAPSE: Status: RESOLVED | Noted: 2024-01-06 | Resolved: 2024-01-07

## 2024-01-07 LAB
ANION GAP SERPL CALCULATED.3IONS-SCNC: 10 MMOL/L (ref 9–17)
BUN SERPL-MCNC: 33 MG/DL (ref 8–23)
CALCIUM SERPL-MCNC: 8.6 MG/DL (ref 8.6–10.4)
CHLORIDE SERPL-SCNC: 99 MMOL/L (ref 98–107)
CO2 SERPL-SCNC: 26 MMOL/L (ref 20–31)
CREAT SERPL-MCNC: 1.1 MG/DL (ref 0.5–0.9)
ERYTHROCYTE [DISTWIDTH] IN BLOOD BY AUTOMATED COUNT: 12.5 % (ref 11.8–14.4)
GFR SERPL CREATININE-BSD FRML MDRD: 53 ML/MIN/1.73M2
GLUCOSE BLD-MCNC: 274 MG/DL (ref 65–105)
GLUCOSE BLD-MCNC: 345 MG/DL (ref 65–105)
GLUCOSE SERPL-MCNC: 266 MG/DL (ref 70–99)
HCT VFR BLD AUTO: 41.2 % (ref 36.3–47.1)
HGB BLD-MCNC: 14.1 G/DL (ref 11.9–15.1)
MCH RBC QN AUTO: 30.9 PG (ref 25.2–33.5)
MCHC RBC AUTO-ENTMCNC: 34.2 G/DL (ref 28.4–34.8)
MCV RBC AUTO: 90.4 FL (ref 82.6–102.9)
NRBC BLD-RTO: 0 PER 100 WBC
PLATELET # BLD AUTO: 162 K/UL (ref 138–453)
PMV BLD AUTO: 11.5 FL (ref 8.1–13.5)
POTASSIUM SERPL-SCNC: 4.3 MMOL/L (ref 3.7–5.3)
RBC # BLD AUTO: 4.56 M/UL (ref 3.95–5.11)
SODIUM SERPL-SCNC: 135 MMOL/L (ref 135–144)
TROPONIN I SERPL HS-MCNC: 13 NG/L (ref 0–14)
WBC OTHER # BLD: 8.4 K/UL (ref 3.5–11.3)

## 2024-01-07 PROCEDURE — APPSS45 APP SPLIT SHARED TIME 31-45 MINUTES: Performed by: NURSE PRACTITIONER

## 2024-01-07 PROCEDURE — 82947 ASSAY GLUCOSE BLOOD QUANT: CPT

## 2024-01-07 PROCEDURE — 2580000003 HC RX 258: Performed by: NURSE PRACTITIONER

## 2024-01-07 PROCEDURE — 96372 THER/PROPH/DIAG INJ SC/IM: CPT

## 2024-01-07 PROCEDURE — 6370000000 HC RX 637 (ALT 250 FOR IP): Performed by: NURSE PRACTITIONER

## 2024-01-07 PROCEDURE — 97166 OT EVAL MOD COMPLEX 45 MIN: CPT

## 2024-01-07 PROCEDURE — 84484 ASSAY OF TROPONIN QUANT: CPT

## 2024-01-07 PROCEDURE — 80048 BASIC METABOLIC PNL TOTAL CA: CPT

## 2024-01-07 PROCEDURE — 96361 HYDRATE IV INFUSION ADD-ON: CPT

## 2024-01-07 PROCEDURE — 36415 COLL VENOUS BLD VENIPUNCTURE: CPT

## 2024-01-07 PROCEDURE — 6360000002 HC RX W HCPCS: Performed by: NURSE PRACTITIONER

## 2024-01-07 PROCEDURE — G0378 HOSPITAL OBSERVATION PER HR: HCPCS

## 2024-01-07 PROCEDURE — 99232 SBSQ HOSP IP/OBS MODERATE 35: CPT | Performed by: INTERNAL MEDICINE

## 2024-01-07 PROCEDURE — 85027 COMPLETE CBC AUTOMATED: CPT

## 2024-01-07 PROCEDURE — 93005 ELECTROCARDIOGRAM TRACING: CPT | Performed by: NURSE PRACTITIONER

## 2024-01-07 PROCEDURE — 97535 SELF CARE MNGMENT TRAINING: CPT

## 2024-01-07 PROCEDURE — 97530 THERAPEUTIC ACTIVITIES: CPT

## 2024-01-07 PROCEDURE — 94640 AIRWAY INHALATION TREATMENT: CPT

## 2024-01-07 RX ORDER — FLUCONAZOLE 100 MG/1
100 TABLET ORAL DAILY
Status: DISCONTINUED | OUTPATIENT
Start: 2024-01-07 | End: 2024-01-07 | Stop reason: HOSPADM

## 2024-01-07 RX ORDER — CLINDAMYCIN HYDROCHLORIDE 300 MG/1
300 CAPSULE ORAL 4 TIMES DAILY
Qty: 24 CAPSULE | Refills: 0 | Status: SHIPPED | OUTPATIENT
Start: 2024-01-07 | End: 2024-01-13

## 2024-01-07 RX ORDER — FLUCONAZOLE 100 MG/1
100 TABLET ORAL DAILY
Qty: 6 TABLET | Refills: 0 | Status: SHIPPED | OUTPATIENT
Start: 2024-01-08 | End: 2024-01-14

## 2024-01-07 RX ADMIN — BUDESONIDE AND FORMOTEROL FUMARATE DIHYDRATE 2 PUFF: 160; 4.5 AEROSOL RESPIRATORY (INHALATION) at 07:59

## 2024-01-07 RX ADMIN — MAGNESIUM GLUCONATE 500 MG ORAL TABLET 400 MG: 500 TABLET ORAL at 08:46

## 2024-01-07 RX ADMIN — CLINDAMYCIN HYDROCHLORIDE 300 MG: 150 CAPSULE ORAL at 01:46

## 2024-01-07 RX ADMIN — SODIUM CHLORIDE, PRESERVATIVE FREE 10 ML: 5 INJECTION INTRAVENOUS at 08:50

## 2024-01-07 RX ADMIN — INSULIN GLARGINE 55 UNITS: 100 INJECTION, SOLUTION SUBCUTANEOUS at 08:50

## 2024-01-07 RX ADMIN — FERROUS SULFATE TAB EC 325 MG (65 MG FE EQUIVALENT) 325 MG: 325 (65 FE) TABLET DELAYED RESPONSE at 08:46

## 2024-01-07 RX ADMIN — SODIUM CHLORIDE: 9 INJECTION, SOLUTION INTRAVENOUS at 03:17

## 2024-01-07 RX ADMIN — PREDNISONE 20 MG: 20 TABLET ORAL at 08:46

## 2024-01-07 RX ADMIN — Medication 1 TABLET: at 08:46

## 2024-01-07 RX ADMIN — INSULIN LISPRO 8 UNITS: 100 INJECTION, SOLUTION INTRAVENOUS; SUBCUTANEOUS at 08:52

## 2024-01-07 RX ADMIN — CLINDAMYCIN HYDROCHLORIDE 300 MG: 150 CAPSULE ORAL at 08:46

## 2024-01-07 RX ADMIN — PANTOPRAZOLE SODIUM 40 MG: 40 TABLET, DELAYED RELEASE ORAL at 08:49

## 2024-01-07 RX ADMIN — ATORVASTATIN CALCIUM 40 MG: 40 TABLET, FILM COATED ORAL at 08:48

## 2024-01-07 RX ADMIN — ALOGLIPTIN 12.5 MG: 12.5 TABLET, FILM COATED ORAL at 09:26

## 2024-01-07 RX ADMIN — FLUCONAZOLE 100 MG: 100 TABLET ORAL at 08:44

## 2024-01-07 RX ADMIN — Medication 50 MCG: at 09:25

## 2024-01-07 RX ADMIN — DOCUSATE SODIUM 100 MG: 100 CAPSULE, LIQUID FILLED ORAL at 08:47

## 2024-01-07 RX ADMIN — ISOSORBIDE DINITRATE 30 MG: 10 TABLET ORAL at 08:47

## 2024-01-07 RX ADMIN — METOPROLOL TARTRATE 12.5 MG: 25 TABLET, FILM COATED ORAL at 08:49

## 2024-01-07 RX ADMIN — OXYCODONE HYDROCHLORIDE AND ACETAMINOPHEN 1 TABLET: 5; 325 TABLET ORAL at 10:19

## 2024-01-07 RX ADMIN — CLINDAMYCIN HYDROCHLORIDE 300 MG: 150 CAPSULE ORAL at 12:11

## 2024-01-07 RX ADMIN — INSULIN LISPRO 16 UNITS: 100 INJECTION, SOLUTION INTRAVENOUS; SUBCUTANEOUS at 12:12

## 2024-01-07 RX ADMIN — OXYBUTYNIN CHLORIDE 5 MG: 5 TABLET, EXTENDED RELEASE ORAL at 08:47

## 2024-01-07 RX ADMIN — ENOXAPARIN SODIUM 30 MG: 100 INJECTION SUBCUTANEOUS at 08:45

## 2024-01-07 ASSESSMENT — PAIN SCALES - WONG BAKER

## 2024-01-07 ASSESSMENT — PAIN DESCRIPTION - DESCRIPTORS: DESCRIPTORS: ACHING;DULL

## 2024-01-07 ASSESSMENT — PAIN DESCRIPTION - LOCATION: LOCATION: CHEST;JAW

## 2024-01-07 ASSESSMENT — PAIN DESCRIPTION - ORIENTATION: ORIENTATION: RIGHT

## 2024-01-07 ASSESSMENT — PAIN - FUNCTIONAL ASSESSMENT: PAIN_FUNCTIONAL_ASSESSMENT: ACTIVITIES ARE NOT PREVENTED

## 2024-01-07 ASSESSMENT — PAIN SCALES - GENERAL
PAINLEVEL_OUTOF10: 9
PAINLEVEL_OUTOF10: 0

## 2024-01-07 NOTE — CARE COORDINATION
Discharge Report    [facility]  Clinical Case Management Department  Written by: EUGENIE SORTO RN    Patient Name: Mariangel Abreu  Attending Provider: Melony Rodriguez MD  Admit Date: 2024  3:47 PM  MRN: [id number]  Account: [id number]                     : 1950  Discharge Date: 24  Call from [facility] o2 to go home with   Called Mariama with [facility] left vm    Disposition: home    EUGENIE SORTO RN     
/24  OT AM-PAC:   /24    Family can provide assistance at DC: Other (comment) (has support at home)  Would you like Case Management to discuss the discharge plan with any other family members/significant others, and if so, who?    Plans to Return to Present Housing: Yes  Other Identified Issues/Barriers to RETURNING to current housing: none  Potential Assistance needed at discharge: Transportation           Patient expects to discharge to: Apartment  Plan for transportation at discharge: Cab    Financial    Payor: [employer] MEDICARE / Plan: [employer] MEDICARE ADVANTAGE HMO / Product Type: Medicare /     Does insurance require precert for SNF: Yes    Potential assistance Purchasing Medications: No  Meds-to-Beds request:  yes      [facility] [place], [place] - [address] [facility] - P [phone] - F [phone]  [address] [facility] [address]  Phone: [phone] Fax: [phone]      Notes:    Factors facilitating achievement of predicted outcomes: Friend support    Barriers to discharge: clinical status    Additional Case Management Notes: home with support, current home o2 [facility], has rollator here, needs cab       The Plan for Transition of Care is related to the following treatment goals of Syncope and collapse [R55]  Dental abscess [K04.7]  Hyperglycemia [R73.9]  Nausea and vomiting, unspecified vomiting type [R11.2]    IF APPLICABLE: The Patient and/or patient representative Mariangel and her family were provided with a choice of provider and agrees with the discharge plan. Freedom of choice list with basic dialogue that supports the patient's individualized plan of care/goals and shares the quality data associated with the providers was provided to:     Patient    The Patient and/or Patient Representative Agree with the Discharge Plan?  yes    EUGENIE SORTO RN  Case Management Department

## 2024-01-07 NOTE — DISCHARGE INSTRUCTIONS
Continue current diabetic medications, your A1c is very elevated but improving  You need to follow-up with a dentist  Okay to resume your home dose of metformin on Tuesday, 1/9/2024

## 2024-01-07 NOTE — PLAN OF CARE
Problem: Chronic Conditions and Co-morbidities  Goal: Patient's chronic conditions and co-morbidity symptoms are monitored and maintained or improved  Outcome: Progressing  Flowsheets (Taken 1/6/2024 1915)  Care Plan - Patient's Chronic Conditions and Co-Morbidity Symptoms are Monitored and Maintained or Improved: Monitor and assess patient's chronic conditions and comorbid symptoms for stability, deterioration, or improvement     Problem: Discharge Planning  Goal: Discharge to home or other facility with appropriate resources  Outcome: Progressing  Flowsheets (Taken 1/6/2024 1915)  Discharge to home or other facility with appropriate resources: Identify barriers to discharge with patient and caregiver     Problem: Pain  Goal: Verbalizes/displays adequate comfort level or baseline comfort level  Outcome: Progressing  Flowsheets (Taken 1/6/2024 1915)  Verbalizes/displays adequate comfort level or baseline comfort level: Assess pain using appropriate pain scale     Problem: Skin/Tissue Integrity  Goal: Absence of new skin breakdown  Description: 1.  Monitor for areas of redness and/or skin breakdown  2.  Assess vascular access sites hourly  3.  Every 4-6 hours minimum:  Change oxygen saturation probe site  4.  Every 4-6 hours:  If on nasal continuous positive airway pressure, respiratory therapy assess nares and determine need for appliance change or resting period.  Outcome: Progressing     Problem: Safety - Adult  Goal: Free from fall injury  Outcome: Progressing  Flowsheets (Taken 1/6/2024 1940)  Free From Fall Injury: Instruct family/caregiver on patient safety     Problem: ABCDS Injury Assessment  Goal: Absence of physical injury  Outcome: Progressing  Flowsheets (Taken 1/6/2024 1940)  Absence of Physical Injury: Implement safety measures based on patient assessment

## 2024-01-08 ENCOUNTER — CARE COORDINATION (OUTPATIENT)
Dept: CASE MANAGEMENT | Age: 74
End: 2024-01-08

## 2024-01-08 DIAGNOSIS — L98.9 PERINEAL IRRITATION IN FEMALE: ICD-10-CM

## 2024-01-08 DIAGNOSIS — R32 URINARY INCONTINENCE IN FEMALE: ICD-10-CM

## 2024-01-08 DIAGNOSIS — Z79.4 TYPE 2 DIABETES MELLITUS WITH DIABETIC POLYNEUROPATHY, WITH LONG-TERM CURRENT USE OF INSULIN (HCC): ICD-10-CM

## 2024-01-08 DIAGNOSIS — D64.9 ANEMIA, UNSPECIFIED TYPE: ICD-10-CM

## 2024-01-08 DIAGNOSIS — E11.42 TYPE 2 DIABETES MELLITUS WITH DIABETIC POLYNEUROPATHY, WITH LONG-TERM CURRENT USE OF INSULIN (HCC): ICD-10-CM

## 2024-01-08 DIAGNOSIS — K59.01 SLOW TRANSIT CONSTIPATION: ICD-10-CM

## 2024-01-08 DIAGNOSIS — I25.83 CORONARY ARTERY DISEASE DUE TO LIPID RICH PLAQUE: ICD-10-CM

## 2024-01-08 DIAGNOSIS — N39.3 STRESS INCONTINENCE: ICD-10-CM

## 2024-01-08 DIAGNOSIS — R55 SYNCOPE AND COLLAPSE: Primary | ICD-10-CM

## 2024-01-08 DIAGNOSIS — J44.1 CHRONIC OBSTRUCTIVE PULMONARY DISEASE WITH ACUTE EXACERBATION (HCC): ICD-10-CM

## 2024-01-08 DIAGNOSIS — E78.2 MIXED HYPERLIPIDEMIA: Chronic | ICD-10-CM

## 2024-01-08 DIAGNOSIS — I25.10 CORONARY ARTERY DISEASE DUE TO LIPID RICH PLAQUE: ICD-10-CM

## 2024-01-08 LAB
EKG ATRIAL RATE: 65 BPM
EKG P AXIS: 59 DEGREES
EKG P-R INTERVAL: 148 MS
EKG Q-T INTERVAL: 388 MS
EKG QRS DURATION: 68 MS
EKG QTC CALCULATION (BAZETT): 403 MS
EKG R AXIS: -31 DEGREES
EKG T AXIS: 73 DEGREES
EKG VENTRICULAR RATE: 65 BPM

## 2024-01-08 PROCEDURE — 93010 ELECTROCARDIOGRAM REPORT: CPT | Performed by: INTERNAL MEDICINE

## 2024-01-08 PROCEDURE — 1111F DSCHRG MED/CURRENT MED MERGE: CPT | Performed by: NURSE PRACTITIONER

## 2024-01-08 NOTE — TELEPHONE ENCOUNTER
Pt states she needs all of her meds refilled she has them sent to her in a bubble pack. Meds have been getting denied due to our office not being the prescribing dr . Patient jackson snot know what she needs fills I did inform her I am unable to send if I dont know what she needs . She states to just refill everything that yassine had ordered . I pended medications to be sent to pharmacy . Patient is highly agitated and wants her depression medication filled but states she does not know the name of med. I asked has she been on the medication before she states she doesn't know . I did inform her of her upcoming appointment for any further questions about her medications she may have. Patient is confused on what she needs and who to call to give her what meds

## 2024-01-08 NOTE — PROGRESS NOTES
CLINICAL PHARMACY NOTE: MEDS TO BEDS    Total # of Prescriptions Filled: 2   The following medications were delivered to the patient:  Fluconazole 100mg tabs  Clindamycin 300mg caps    Additional Documentation:  Delivered to pt in room OBS 26 on 1/7 at 11:11A. No copay.  
Comprehensive Nutrition Assessment    Type and Reason for Visit:  RD Nutrition Re-Screen/LOS    Nutrition Recommendations/Plan:   Continue current diet, Regular  Monitor PO intake     Malnutrition Assessment:  Malnutrition Status:  Insufficient data (01/06/24 1500)    Context:  Acute Illness     Findings of the 6 clinical characteristics of malnutrition:  Energy Intake:  Mild decrease in energy intake (Comment)  Weight Loss:  No significant weight loss     Body Fat Loss:  Unable to assess     Muscle Mass Loss:  Unable to assess    Fluid Accumulation:  Mild     Strength:  Not Performed    Nutrition Assessment:    RNC noted for wt loss and poor po intake pta. 74 yo F adm syncope and collapse. PMH significant for CKD, CAD, CHF, COPD, T2DM, GERD, hearing loss. Per chart review, pt wt stable/slight wt gain x 12 mos. Wt hx: 229 lb (11/8/23), 230 lb (7/20/23), 220 lb (1/9/23). Pt with % PO intake x 1 meal per chart this admission. LBM 1/5. +1 generalized, +3 BLE edema noted.    Nutrition Related Findings:    labs/meds reviewed Wound Type: None       Current Nutrition Intake & Therapies:    Average Meal Intake: %  Average Supplements Intake: None Ordered  ADULT DIET; Regular    Anthropometric Measures:  Height: 157.5 cm (5' 2.01\")  Ideal Body Weight (IBW): 110 lbs (50 kg)    Admission Body Weight: 104.6 kg (230 lb 9.6 oz)  Current Body Weight: 104.6 kg (230 lb 9.6 oz) (1/6/24), 209.6 % IBW.    Current BMI (kg/m2): 42.2  Usual Body Weight: 104.3 kg (230 lb) (7/20/23)  % Weight Change (Calculated): 0.3  Weight Adjustment For: No Adjustment                 BMI Categories: Obese Class 3 (BMI 40.0 or greater)    Estimated Daily Nutrient Needs:  Energy Requirements Based On: Formula  Weight Used for Energy Requirements: Admission  Energy (kcal/day): 1900 to 2000 kcal/day  Weight Used for Protein Requirements: Ideal  Protein (g/day): 60 to 80 g/day  Method Used for Fluid Requirements: 1 ml/kcal  Fluid (ml/day): 
Dr. Rodriguez at bedside.  
EKG done.  
Evaluated pt, in no distress, currently on home O2 settings 2-3L. Will call if need further intervention. RN informed.   
Patient complains of right jaw pain that goes across her chest which started after physical therapy. Percocet given for pain and Jenna Angulo notified thru perfect serve.  
Patient given discharge instructions.  Patient instructed to take antibiotics, make a dental appointment and follow up with cardiology.  Oxygen delivered to room per .  
Patient resting in bed.  No signs of distress noted.  
Patient stated to writer at admission to observation unit she has cash in her purse she would like locked up by security because she is worried about it. Security notified and came to bedside and counted money with patient . At that time patient decided to keep money in wallet and at bedside. security confirmed patient has $277.92 cash.  
Physical Therapy  Facility/Department: [facility] OBSERVATION UNIT  Physical Therapy Initial Assessment    Name: Mariangel Abreu  : 1950  MRN: [id number]  Date of Service: 2024    Discharge Recommendations:  Continue to assess pending progress, Patient would benefit from continued therapy after discharge          Patient Diagnosis(es): The primary encounter diagnosis was Syncope and collapse. Diagnoses of Nausea and vomiting, unspecified vomiting type, Dental abscess, and Hyperglycemia were also pertinent to this visit.  Past Medical History:  has a past medical history of Abdominal bloating, Adenomatous polyp of ascending colon, Allergic rhinitis, Anxiety, Arthritis, Asthma, Atrial fibrillation ([facility]), Back pain, Benign hypertension with CKD (chronic kidney disease) stage III ([facility]), CAD (coronary artery disease), Caffeine use, CHF (congestive heart failure) ([facility]), Chronic kidney disease, CKD (chronic kidney disease) stage 3, GFR 30-59 ml/min ([facility]), COPD (chronic obstructive pulmonary disease) ([facility]), Degeneration of lumbar or lumbosacral intervertebral disc, Depression, DM (diabetes mellitus) ([facility]), Emphysema of lung ([facility]), Gastritis, GERD (gastroesophageal reflux disease), Hearing loss, Hematuria, Hiatal hernia, HTN (hypertension), benign, Hypertriglyceridemia, Incontinence of urine, Irritable bowel syndrome with both constipation and diarrhea, Kidney stone, Knee arthropathy, Lumbosacral spondylosis without myelopathy, Migraine headache, Mumps, Neuropathy, Obesity, On home oxygen therapy, HIGINIO on CPAP, Otitis media, Secondary diabetes mellitus with stage 3 chronic kidney disease (GFR 30-59) ([facility]), Stroke ([facility]), Tinnitus, Type 2 diabetes mellitus without complication ([facility]), Type II or unspecified type diabetes mellitus without mention of complication, not stated as uncontrolled, UTI (lower urinary tract infection), and Varicose vein.  Past Surgical History:  has a past surgical history that includes 
Reviewed patient's urine culture - culture positive for Candida.  Patient was discharged on fluconazole, and culture is sensitive to prescribed medication.  Antibiotic prescribed at discharge is appropriate - no changes made to antibiotic regimen.     Brenda Tucker, ClaytonD, BCCCP  1/8/2024  3:14 PM   
[facility]  Office: [phone]  Goldy Moore DO, Ferdinand Villeda DO, Jose Goff DO, Alejo Mao DO, Mainor Smith MD, Melony Rodriguez MD, Tonia Brenner MD, Alethea Ramsey MD,  Rahul Zheng MD, Josi Saldana MD, Pedro Deras MD,  Lauren Acosta DO, Randee Pantoja MD, Raji Reddy MD, Praveen Moore DO, Veronica Duff MD,  Spencer Ferguson DO, Olivia Boss MD, Veronika Lin MD, Caryl Zacarias MD, Slick Jean MD,  Tacos Yang MD, Guzman Stein MD, Vicente Yee MD, Mickey Avila MD, Conrad Bailey MD, Raisa Felder MD, Travon Blanchard DO, Joel Rome DO, Mariel Francisco MD,  Mika Valentin MD, Shirley Waterhouse, CNP,  Isamar Seals CNP, Jose Warren, CNP,  Jenna Angulo, NAKUL, Jeannette Bobo, CNP, Sofya Rose, CNP, Zoe Coates CNP, Lara Yan CNP, Michelle García, CNP, Tamara Issa, PA-C, Halina Lynch PA-C, Keri Weaver, CNP, Cheyanne White, CNS, Heide Collier, CNP, Amber Mast, CNP, Tracy Schwab, CNP         [facility]   IN-PATIENT SERVICE   [facility]    Progress Note    1/7/2024    8:03 AM    Name:   Mariangel Abreu  MRN:     [id number]     Acct:      [id number]   Room:   OBS 26/26-1  IP Day:  0  Admit Date:  1/5/2024  3:47 PM    PCP:   Carla Chua APRN - CNP  Code Status:  Full Code    Subjective:     C/C:   Chief Complaint   Patient presents with    Loss of Consciousness     States fell and landed on walker. Denies hitting head     Dental Injury    Abdominal Pain    Emesis     Interval History Status: improved.     Patient seen and evaluated in room, no acute events overnight.  Tolerating oral intake well.  Blood sugars in the 220 range.  Much improved since admission.  Tolerating home dose of oxygen.  Wheelchair/scooter/walker bound at baseline.  Troponins negative    Brief History:     73-year-old patient with multiple full medical conditions listed below who presents to the emergency department for 
setup  UE Bathing: Modified independent ;Setup  UE Bathing Skilled Clinical Factors: pt sat on rollator to clean arms and underarms noting no deficits; setup required  LE Bathing: Minimal assistance;Setup;Increased time to complete  UE Dressing: Modified independent ;Setup  UE Dressing Skilled Clinical Factors: pt doffed soiled and donned clean gown while seated, completing task independently following setup  LE Dressing: Minimal assistance;Setup;Increased time to complete  Toileting: Minimal assistance;Setup;Increased time to complete  Toileting Skilled Clinical Factors: pt stood for management and adjustment of brief with CGA however is expected to require increased assistance to complete clothing management from foot level and for thorough pericare    Vision  Vision: Impaired  Vision Exceptions: Wears glasses at all times  Hearing  Hearing: Exceptions to WFL  Hearing Exceptions: Bilateral hearing aid  Cognition  Overall Cognitive Status: Exceptions  Arousal/Alertness: Appropriate responses to stimuli  Following Commands: Follows all commands without difficulty  Attention Span: Attends with cues to redirect  Safety Judgement: Decreased awareness of need for safety;Decreased awareness of need for assistance  Problem Solving: Assistance required to correct errors made  Insights: Decreased awareness of deficits  Cognition Comment: pt is extremely hyperverbose and requires frequent redirection to tasks. Pt exhibits safety awareness deficits, often requiring cues to improve safety and decrease risk for falls  Orientation  Overall Orientation Status: Within Functional Limits  Orientation Level: Oriented X4    Education Provided Comments: Pt ed on OT role, OT POC, safety awareness, transfer training, DME use, bed mobility training, adaptive ADL tech, fall prevention, and importance of continued OT. Good return    Hand Dominance  Hand Dominance: Right              AM-PAC - ADL  AM-PAC Daily Activity - Inpatient   How much

## 2024-01-08 NOTE — CARE COORDINATION
Care Transitions Initial Follow Up Call    Call within 2 business days of discharge: Yes    Patient Current Location:  Home: [address]  [place] [address]    Care Transition Nurse contacted the patient by telephone to perform post hospital discharge assessment. Verified name and  with patient as identifiers. Provided introduction to self, and explanation of the Care Transition Nurse role.     Patient: Mariangel Abreu Patient : 1950   MRN: [id number]  Reason for Admission: Syncope and collapse, abdominal pain, emesis/ dental abscess  Discharge Date: 24 RARS: Readmission Risk Score: 17.7      Last Discharge Facility       Date Complaint Diagnosis Description Type Department Provider    24 Loss of Consciousness; Dental Injury; Abdominal Pain; Emesis Syncope and collapse ... ED to Hosp-Admission (Discharged) (ADMITTED) [facility] Melony Rodriguez MD; Raffaele Kee...            Was this an external facility discharge? No Discharge Facility: [facility]    Challenges to be reviewed by the provider   Additional needs identified to be addressed with provider: Yes  7 day hospital follow up appointment               Method of communication with provider: chart routing.    Attempt to reach patient for Care Transitions. LMOM requesting return call. Contact information provided.      Mariangel returned call.  She stated that she was feeling somewhat better, just  weak.  She said that she still has a little abdominal pain and no emesis.  She denies any fever/chills.  Reviewed medications and she stated that she has her medications bubble packaged and has all her home medications.  She nahed that she will be calling the pharmacy about the stopped medications and have them removed.  She said that she did call the PCP office and made her follow up appointment for next week.  She will be calling cardiologist for another appointment and also calling a dentist for an appointment.  She had no question or

## 2024-01-09 ENCOUNTER — CARE COORDINATION (OUTPATIENT)
Dept: CASE MANAGEMENT | Age: 74
End: 2024-01-09

## 2024-01-09 LAB
EKG ATRIAL RATE: 77 BPM
EKG P AXIS: 56 DEGREES
EKG P-R INTERVAL: 140 MS
EKG Q-T INTERVAL: 376 MS
EKG QRS DURATION: 74 MS
EKG QTC CALCULATION (BAZETT): 425 MS
EKG R AXIS: -38 DEGREES
EKG T AXIS: 90 DEGREES
EKG VENTRICULAR RATE: 77 BPM

## 2024-01-09 PROCEDURE — 93010 ELECTROCARDIOGRAM REPORT: CPT | Performed by: INTERNAL MEDICINE

## 2024-01-09 RX ORDER — DULAGLUTIDE 1.5 MG/.5ML
INJECTION, SOLUTION SUBCUTANEOUS
Qty: 4 ADJUSTABLE DOSE PRE-FILLED PEN SYRINGE | Refills: 11 | OUTPATIENT
Start: 2024-01-09

## 2024-01-09 RX ORDER — INSULIN ASPART 100 [IU]/ML
INJECTION, SOLUTION INTRAVENOUS; SUBCUTANEOUS
Qty: 5 ADJUSTABLE DOSE PRE-FILLED PEN SYRINGE | Refills: 11 | OUTPATIENT
Start: 2024-01-09

## 2024-01-09 RX ORDER — BUDESONIDE AND FORMOTEROL FUMARATE DIHYDRATE 160; 4.5 UG/1; UG/1
2 AEROSOL RESPIRATORY (INHALATION) 2 TIMES DAILY
Qty: 10.2 G | Refills: 2 | Status: SHIPPED | OUTPATIENT
Start: 2024-01-09

## 2024-01-09 RX ORDER — INSULIN GLARGINE 100 [IU]/ML
INJECTION, SOLUTION SUBCUTANEOUS
Qty: 15 ADJUSTABLE DOSE PRE-FILLED PEN SYRINGE | Refills: 3 | OUTPATIENT
Start: 2024-01-09 | End: 2024-02-08

## 2024-01-09 RX ORDER — DOCUSATE SODIUM 100 MG/1
100 CAPSULE, LIQUID FILLED ORAL 2 TIMES DAILY
Qty: 60 CAPSULE | Refills: 2 | OUTPATIENT
Start: 2024-01-09

## 2024-01-09 RX ORDER — FLURBIPROFEN SODIUM 0.3 MG/ML
SOLUTION/ DROPS OPHTHALMIC
Qty: 100 EACH | Refills: 5 | Status: SHIPPED | OUTPATIENT
Start: 2024-01-09

## 2024-01-09 RX ORDER — ASPIRIN 81 MG/1
81 TABLET ORAL DAILY
Qty: 90 TABLET | Refills: 4 | OUTPATIENT
Start: 2024-01-09

## 2024-01-09 RX ORDER — FERROUS SULFATE 325(65) MG
TABLET ORAL
Qty: 90 TABLET | Refills: 2 | OUTPATIENT
Start: 2024-01-09

## 2024-01-09 RX ORDER — ATORVASTATIN CALCIUM 40 MG/1
40 TABLET, FILM COATED ORAL DAILY
Qty: 30 TABLET | Refills: 6 | OUTPATIENT
Start: 2024-01-09

## 2024-01-09 RX ORDER — OXYBUTYNIN CHLORIDE 5 MG/1
5 TABLET, EXTENDED RELEASE ORAL DAILY
Qty: 30 TABLET | Refills: 2 | OUTPATIENT
Start: 2024-01-09

## 2024-01-09 RX ORDER — PANTOPRAZOLE SODIUM 40 MG/1
40 TABLET, DELAYED RELEASE ORAL 2 TIMES DAILY
Qty: 60 TABLET | Refills: 11 | OUTPATIENT
Start: 2024-01-09

## 2024-01-09 RX ORDER — NYSTATIN 100000 [USP'U]/G
POWDER TOPICAL
Qty: 30 G | Refills: 3 | OUTPATIENT
Start: 2024-01-09

## 2024-01-09 RX ORDER — ISOSORBIDE DINITRATE 30 MG/1
30 TABLET ORAL DAILY
Qty: 30 TABLET | Refills: 11 | OUTPATIENT
Start: 2024-01-09

## 2024-01-09 NOTE — CARE COORDINATION
Care Transitions Follow Up Call    Patient Current Location:  Home: [address]  [place] [place] [address]    Care Transition Nurse contacted the patient by telephone to follow up after admission on 24.  Verified name and  with patient as identifiers.    Patient: Mariangel Abreu  Patient : 1950   MRN: [id number]  Reason for Admission: Syncope and collapse, abdominal pain, emesis/ dental abscess  Discharge Date: 24 RARS: Readmission Risk Score: 17.7      Needs to be reviewed by the provider   Additional needs identified to be addressed with provider: Yes  medications-reviewed medications with pharmacy and sent with PCP             Method of communication with provider: staff message.    CAll placed to [facility] and spoke with Josh.  Medications reviewed.  List compiled of medications that they are filling, med that need refills and OTC medications that pt needs to buy.  Unfortunately did not taking in account discharge AVS and 3 of the medications that needed refills were stopped.  Resent message to Carla about this.  Called Mariangel to update on call and she stated that although they stopped the Lexapro, she still needs it and the pharmacy will be sending it to her.  Encouraged her to take her bubble packs and pill bottles with her to her PCP appointment.  She said that she was planning on it was was going to get her medications straightened out then.  She had no further questions or concerns.    Addressed changes since last contact:  medications-medication review with pharmac  Discussed follow-up appointments. If no appointment was previously scheduled, appointment scheduling offered: Yes.   Is follow up appointment scheduled within 7 days of discharge? No.    Follow Up  Future Appointments   Date Time Provider Department Center   2024 11:00 AM Prabha Hammond APRN - CNP [facility]   2024  8:45 AM Carla Chua APRN - CNP [facility]

## 2024-01-09 NOTE — TELEPHONE ENCOUNTER
Vickie Lamas, Carla Powers, APRN - CNP  Sj reached out to me about the pt requesting refills on her medications.  I did call [facility] and did do a medication review with them  see below  medication not listed, pt has and do not need any refills      Medications list reviewed with pharmacy     Medications that pt is taking that need refills  1. Coreg 3.125 po BID  2. Plavix 75 mg po daily  3. Lexapro 20 mg daily  4. Fenofibrate 160 mg daily   5. Losartan 25 mg daily    OTC medications that they do not fill but listed Medications listed but are not filled by pharm  1.Ferosul 1 Bentyl  2 Magnesium oxide 2 Zofran  3 melatonin 3 metoprolol tartrate ( which I assume is  4  MVI stopped due to Coreg)  5 ASA 81 mg 4 Trulicity not sure if filled some where else  6 Vit B12 5 Vibegron  but is on oxybutynin    Pt is also not getting her albuterol nebulizer solution filled, pharmacy is filling Duoneb 2.5-5 3ml every 6 hr prn    UPDATE:  Vickie Lamas RN Brooks, Rachel J, APRN - CNP  I am sorry Plavix, Lexapro, fenofibrate was stopped at discharge

## 2024-01-16 ENCOUNTER — HOSPITAL ENCOUNTER (OUTPATIENT)
Dept: PAIN MANAGEMENT | Age: 74
Discharge: HOME | End: 2024-01-16
Payer: MEDICARE

## 2024-01-16 VITALS — HEIGHT: 62 IN | BODY MASS INDEX: 42.14 KG/M2 | WEIGHT: 229 LBS

## 2024-01-16 DIAGNOSIS — M54.41 CHRONIC BILATERAL LOW BACK PAIN WITH BILATERAL SCIATICA: Chronic | ICD-10-CM

## 2024-01-16 DIAGNOSIS — G89.29 CHRONIC BILATERAL LOW BACK PAIN WITH BILATERAL SCIATICA: Chronic | ICD-10-CM

## 2024-01-16 DIAGNOSIS — M51.36 DDD (DEGENERATIVE DISC DISEASE), LUMBAR: ICD-10-CM

## 2024-01-16 DIAGNOSIS — Z79.891 CHRONIC USE OF OPIATE FOR THERAPEUTIC PURPOSE: ICD-10-CM

## 2024-01-16 DIAGNOSIS — M46.1 SACROILIITIS, NOT ELSEWHERE CLASSIFIED (HCC): Chronic | ICD-10-CM

## 2024-01-16 DIAGNOSIS — M54.16 LUMBAR RADICULOPATHY, CHRONIC: ICD-10-CM

## 2024-01-16 DIAGNOSIS — F11.90 CHRONIC, CONTINUOUS USE OF OPIOIDS: ICD-10-CM

## 2024-01-16 DIAGNOSIS — M47.817 LUMBOSACRAL SPONDYLOSIS WITHOUT MYELOPATHY: Primary | Chronic | ICD-10-CM

## 2024-01-16 DIAGNOSIS — M50.30 DDD (DEGENERATIVE DISC DISEASE), CERVICAL: Chronic | ICD-10-CM

## 2024-01-16 DIAGNOSIS — M54.42 CHRONIC BILATERAL LOW BACK PAIN WITH BILATERAL SCIATICA: Chronic | ICD-10-CM

## 2024-01-16 PROCEDURE — 99213 OFFICE O/P EST LOW 20 MIN: CPT | Performed by: NURSE PRACTITIONER

## 2024-01-16 PROCEDURE — 99213 OFFICE O/P EST LOW 20 MIN: CPT

## 2024-01-16 RX ORDER — TOPIRAMATE 100 MG/1
100 TABLET, FILM COATED ORAL NIGHTLY
Qty: 90 TABLET | Refills: 2 | Status: SHIPPED | OUTPATIENT
Start: 2024-01-16

## 2024-01-16 RX ORDER — OXYCODONE HYDROCHLORIDE AND ACETAMINOPHEN 5; 325 MG/1; MG/1
1 TABLET ORAL EVERY 8 HOURS PRN
Qty: 90 TABLET | Refills: 0 | Status: SHIPPED | OUTPATIENT
Start: 2024-01-19 | End: 2024-02-18

## 2024-01-16 ASSESSMENT — ENCOUNTER SYMPTOMS
CONSTIPATION: 0
BACK PAIN: 1
BOWEL INCONTINENCE: 0
SHORTNESS OF BREATH: 0
COUGH: 0

## 2024-01-16 ASSESSMENT — PAIN SCALES - GENERAL: PAINLEVEL_OUTOF10: 9

## 2024-01-16 NOTE — PROGRESS NOTES
Iodides Itching    Morphine Other (See Comments)     hallucinations    Moxifloxacin      Other reaction(s): Intolerance-unknown  Other reaction(s): Intolerance-unknown    Reglan [Metoclopramide] Nausea Only    Avelox [Moxifloxacin Hcl In Nacl] Rash     Dermatitis      Cipro Xr Rash     dermatitis    Sulfa Antibiotics Rash         Current Outpatient Medications:     budesonide-formoterol (SYMBICORT) 160-4.5 MCG/ACT AERO, Inhale 2 puffs into the lungs 2 times daily As needed for sob, Disp: 10.2 g, Rfl: 2    Insulin Pen Needle (ULTICARE MINI PEN NEEDLES) 31G X 6 MM MISC, Inject 3 times daily, Disp: 100 each, Rfl: 5    metoprolol tartrate (LOPRESSOR) 25 MG tablet, Take 0.5 tablets by mouth 2 times daily, Disp: 60 tablet, Rfl: 3    blood glucose test strips (ONETOUCH ULTRA) strip, TEST TWICE A DAY TO 3 TIMES DAILY AS NEEDED, Disp: 100 each, Rfl: 0    oxyCODONE-acetaminophen (PERCOCET) 5-325 MG per tablet, Take 1 tablet by mouth every 8 hours as needed for Pain for up to 30 days. Intended supply: 30 days., Disp: 90 tablet, Rfl: 0    nystatin (MYCOSTATIN) 574956 UNIT/GM powder, Apply topically 4 times daily., Disp: 60 g, Rfl: 0    Continuous Blood Gluc  (FREESTYLE SHIVA 2 READER) HARMEET, Apply  with shiva sensor daily and as needed, Disp: 1 each, Rfl: 0    Continuous Blood Gluc Sensor (FREESTYLE SHIVA 2 SENSOR) [facility], Apply to arm once every 2 weeks., Disp: 6 each, Rfl: 5    LANTUS SOLOSTAR 100 UNIT/ML injection pen, INJECT 42 UNITS INTO THE SKIN NIGHTLY (Patient taking differently: 55 Units 2 times daily), Disp: 15 Adjustable Dose Pre-filled Pen Syringe, Rfl: 3    vibegron (GEMTESA) 75 MG TABS tablet, Take 1 tablet by mouth daily, Disp: 28 tablet, Rfl: 0    JANUVIA 50 MG tablet, TAKE 1 TABLET BY MOUTH DAILY, Disp: 30 tablet, Rfl: 2    albuterol sulfate HFA (VENTOLIN HFA) 108 (90 Base) MCG/ACT inhaler, Inhale 2 puffs into the lungs 4 times daily as needed for Wheezing, Disp: 18 g, Rfl: 5    oxybutynin

## 2024-01-17 ENCOUNTER — OFFICE VISIT (OUTPATIENT)
Dept: PRIMARY CARE CLINIC | Age: 74
End: 2024-01-17
Payer: MEDICARE

## 2024-01-17 VITALS
BODY MASS INDEX: 41.88 KG/M2 | OXYGEN SATURATION: 98 % | DIASTOLIC BLOOD PRESSURE: 63 MMHG | SYSTOLIC BLOOD PRESSURE: 127 MMHG | WEIGHT: 229 LBS | HEART RATE: 69 BPM

## 2024-01-17 DIAGNOSIS — Z79.4 TYPE 2 DIABETES MELLITUS WITH DIABETIC POLYNEUROPATHY, WITH LONG-TERM CURRENT USE OF INSULIN (HCC): Primary | ICD-10-CM

## 2024-01-17 DIAGNOSIS — J44.1 CHRONIC OBSTRUCTIVE PULMONARY DISEASE WITH ACUTE EXACERBATION (HCC): ICD-10-CM

## 2024-01-17 DIAGNOSIS — I50.32 CHRONIC DIASTOLIC CONGESTIVE HEART FAILURE (HCC): ICD-10-CM

## 2024-01-17 DIAGNOSIS — E78.2 MIXED HYPERLIPIDEMIA: ICD-10-CM

## 2024-01-17 DIAGNOSIS — E11.42 TYPE 2 DIABETES MELLITUS WITH DIABETIC POLYNEUROPATHY, WITH LONG-TERM CURRENT USE OF INSULIN (HCC): Primary | ICD-10-CM

## 2024-01-17 PROBLEM — H93.19 TINNITUS, UNSPECIFIED EAR: Status: ACTIVE | Noted: 2021-10-09

## 2024-01-17 PROBLEM — D75.89 OTHER SPECIFIED DISEASES OF BLOOD AND BLOOD-FORMING ORGANS: Status: ACTIVE | Noted: 2021-10-09

## 2024-01-17 PROBLEM — I83.90 ASYMPTOMATIC VARICOSE VEINS OF UNSPECIFIED LOWER EXTREMITY: Status: ACTIVE | Noted: 2021-10-09

## 2024-01-17 PROBLEM — H91.93 UNSPECIFIED HEARING LOSS, BILATERAL: Status: ACTIVE | Noted: 2021-10-09

## 2024-01-17 PROBLEM — I13.2 HYPERTENSIVE HEART AND CHRONIC KIDNEY DISEASE WITH HEART FAILURE AND WITH STAGE 5 CHRONIC KIDNEY DISEASE, OR END STAGE RENAL DISEASE (HCC): Status: ACTIVE | Noted: 2021-10-09

## 2024-01-17 PROBLEM — N17.9 ACUTE KIDNEY FAILURE, UNSPECIFIED (HCC): Status: ACTIVE | Noted: 2021-10-09

## 2024-01-17 PROBLEM — J30.9 ALLERGIC RHINITIS, UNSPECIFIED: Status: ACTIVE | Noted: 2021-10-09

## 2024-01-17 PROBLEM — Z96.1 PRESENCE OF INTRAOCULAR LENS: Status: ACTIVE | Noted: 2021-10-09

## 2024-01-17 PROBLEM — K44.9 DIAPHRAGMATIC HERNIA WITHOUT OBSTRUCTION OR GANGRENE: Status: ACTIVE | Noted: 2021-10-09

## 2024-01-17 PROBLEM — R26.2 DIFFICULTY IN WALKING, NOT ELSEWHERE CLASSIFIED: Status: ACTIVE | Noted: 2021-10-09

## 2024-01-17 PROBLEM — Z99.89 DEPENDENCE ON OTHER ENABLING MACHINES AND DEVICES: Status: ACTIVE | Noted: 2021-10-09

## 2024-01-17 PROBLEM — M62.81 MUSCLE WEAKNESS (GENERALIZED): Status: ACTIVE | Noted: 2021-10-09

## 2024-01-17 LAB
EKG ATRIAL RATE: 89 BPM
EKG P AXIS: 59 DEGREES
EKG P-R INTERVAL: 128 MS
EKG Q-T INTERVAL: 352 MS
EKG QRS DURATION: 68 MS
EKG QTC CALCULATION (BAZETT): 428 MS
EKG R AXIS: -51 DEGREES
EKG T AXIS: 86 DEGREES
EKG VENTRICULAR RATE: 89 BPM

## 2024-01-17 PROCEDURE — 1036F TOBACCO NON-USER: CPT | Performed by: NURSE PRACTITIONER

## 2024-01-17 PROCEDURE — 1090F PRES/ABSN URINE INCON ASSESS: CPT | Performed by: NURSE PRACTITIONER

## 2024-01-17 PROCEDURE — 3074F SYST BP LT 130 MM HG: CPT | Performed by: NURSE PRACTITIONER

## 2024-01-17 PROCEDURE — G8399 PT W/DXA RESULTS DOCUMENT: HCPCS | Performed by: NURSE PRACTITIONER

## 2024-01-17 PROCEDURE — 2022F DILAT RTA XM EVC RTNOPTHY: CPT | Performed by: NURSE PRACTITIONER

## 2024-01-17 PROCEDURE — 3078F DIAST BP <80 MM HG: CPT | Performed by: NURSE PRACTITIONER

## 2024-01-17 PROCEDURE — 1123F ACP DISCUSS/DSCN MKR DOCD: CPT | Performed by: NURSE PRACTITIONER

## 2024-01-17 PROCEDURE — G8484 FLU IMMUNIZE NO ADMIN: HCPCS | Performed by: NURSE PRACTITIONER

## 2024-01-17 PROCEDURE — 3023F SPIROM DOC REV: CPT | Performed by: NURSE PRACTITIONER

## 2024-01-17 PROCEDURE — G8417 CALC BMI ABV UP PARAM F/U: HCPCS | Performed by: NURSE PRACTITIONER

## 2024-01-17 PROCEDURE — 3017F COLORECTAL CA SCREEN DOC REV: CPT | Performed by: NURSE PRACTITIONER

## 2024-01-17 PROCEDURE — 99214 OFFICE O/P EST MOD 30 MIN: CPT | Performed by: NURSE PRACTITIONER

## 2024-01-17 PROCEDURE — G8427 DOCREV CUR MEDS BY ELIG CLIN: HCPCS | Performed by: NURSE PRACTITIONER

## 2024-01-17 PROCEDURE — 3046F HEMOGLOBIN A1C LEVEL >9.0%: CPT | Performed by: NURSE PRACTITIONER

## 2024-01-17 RX ORDER — INSULIN GLARGINE 100 [IU]/ML
55 INJECTION, SOLUTION SUBCUTANEOUS 2 TIMES DAILY
Qty: 33 ML | Refills: 3 | Status: SHIPPED | OUTPATIENT
Start: 2024-01-17 | End: 2024-05-16

## 2024-01-17 RX ORDER — ESCITALOPRAM OXALATE 20 MG/1
20 TABLET ORAL EVERY MORNING
Qty: 90 TABLET | Refills: 1 | Status: SHIPPED | OUTPATIENT
Start: 2024-01-17

## 2024-01-17 ASSESSMENT — PATIENT HEALTH QUESTIONNAIRE - PHQ9
7. TROUBLE CONCENTRATING ON THINGS, SUCH AS READING THE NEWSPAPER OR WATCHING TELEVISION: 0
SUM OF ALL RESPONSES TO PHQ QUESTIONS 1-9: 4
SUM OF ALL RESPONSES TO PHQ QUESTIONS 1-9: 4
1. LITTLE INTEREST OR PLEASURE IN DOING THINGS: 1
8. MOVING OR SPEAKING SO SLOWLY THAT OTHER PEOPLE COULD HAVE NOTICED. OR THE OPPOSITE, BEING SO FIGETY OR RESTLESS THAT YOU HAVE BEEN MOVING AROUND A LOT MORE THAN USUAL: 1
SUM OF ALL RESPONSES TO PHQ QUESTIONS 1-9: 4
2. FEELING DOWN, DEPRESSED OR HOPELESS: 0
SUM OF ALL RESPONSES TO PHQ QUESTIONS 1-9: 4
4. FEELING TIRED OR HAVING LITTLE ENERGY: 1
3. TROUBLE FALLING OR STAYING ASLEEP: 0
6. FEELING BAD ABOUT YOURSELF - OR THAT YOU ARE A FAILURE OR HAVE LET YOURSELF OR YOUR FAMILY DOWN: 0
5. POOR APPETITE OR OVEREATING: 1
SUM OF ALL RESPONSES TO PHQ9 QUESTIONS 1 & 2: 1
9. THOUGHTS THAT YOU WOULD BE BETTER OFF DEAD, OR OF HURTING YOURSELF: 0
10. IF YOU CHECKED OFF ANY PROBLEMS, HOW DIFFICULT HAVE THESE PROBLEMS MADE IT FOR YOU TO DO YOUR WORK, TAKE CARE OF THINGS AT HOME, OR GET ALONG WITH OTHER PEOPLE: 0

## 2024-01-17 NOTE — PATIENT INSTRUCTIONS
[facility]   [facility]   [place] [address]

## 2024-01-17 NOTE — PROGRESS NOTES
uncontrolled however improved with most recent A1c.  Continue current basal insulin dosing at twice daily advised to continue Januvia and metformin along with sliding scale insulin.  No further episodes of syncope, has not been seen by cardiology for more than a year.  I did contact [facility] cardiology who confirms patient has missed or canceled multiple visits in the last 18 months.  I did request they reach out and schedule the patient due to her recent syncopal episode.  Patient referred for ambulatory care management, agreeable    Return in about 3 months (around 4/17/2024) for Diabetes.         Subjective   SUBJECTIVE/OBJECTIVE:  Mariangel Abreu is a 73-year-old female here today to review current medication list.  The patient has a print out from her pharmacy but does not have any medication was with her, states her meds, bubble packs.    She is currently taking her basal insulin 55 units twice a day along with sliding scale insulin.  The patient cannot confirm what medications are in her bubble pack    Mariangel states she was in the ER 2 weeks ago for loss of consciousness.  She has had no further issues since that visit.  No episodes of hypoglycemia.  She completed the antibiotic for the dental abscess and is working on scheduling with a dentist that accepts her insurance, however she is finding limited access.    Mariangel follows with [facility] cardiology but has not been seen based on chart review for over a year          Review of Systems       Objective     DIAGNOSTIC FINDINGS:  CBC:  Lab Results   Component Value Date/Time    WBC 8.4 01/07/2024 08:49 AM    HGB 14.1 01/07/2024 08:49 AM     01/07/2024 08:49 AM     03/08/2012 06:13 AM       BMP:    Lab Results   Component Value Date/Time     01/07/2024 08:49 AM    K 4.3 01/07/2024 08:49 AM    CL 99 01/07/2024 08:49 AM    CO2 26 01/07/2024 08:49 AM    BUN 33 01/07/2024 08:49 AM    CREATININE 1.1 01/07/2024 08:49 AM    GLUCOSE 266

## 2024-01-18 ENCOUNTER — CARE COORDINATION (OUTPATIENT)
Dept: CASE MANAGEMENT | Age: 74
End: 2024-01-18

## 2024-01-18 NOTE — CARE COORDINATION
Care Transitions Follow Up Call      Patient: Mariangel Abreu  Patient : 1950   MRN: [id number]  Reason for Admission: Syncope and collapse, abdominal pain, emesis/ dental abscess  Discharge Date: 24 RARS: Readmission Risk Score: 17.7      Needs to be reviewed by the provider   Additional needs identified to be addressed with provider: No  none             Method of communication with provider: none.    1st attempt to reach patient for Care Transitions. LMOM requesting return call. Contact information provided.  [phone]    Follow Up  Future Appointments   Date Time Provider Department Center   2024  9:40 AM Karen Cardoso, APRN - CNP [facility] [place]   2024  1:30 PM Crala Chua APRN - CNP [facility]        Care Transitions Subsequent and Final Call    Subsequent and Final Calls  Care Transitions Interventions  Other Interventions:           Plan for next call: symptom management-follow up on dental infection and if scheduled with a dentist.  Were medications clarified at PCP office  any further syncope/dizziness   follow-up appointment-did she make follow up with cardio    ISH WATKINS RN

## 2024-01-19 ENCOUNTER — CARE COORDINATION (OUTPATIENT)
Dept: CASE MANAGEMENT | Age: 74
End: 2024-01-19

## 2024-01-19 NOTE — CARE COORDINATION
Care Transitions Follow Up Call      Patient: Mariangel Abreu  Patient : 1950   MRN: [id number]  Reason for Admission: Syncope and collapse, abdominal pain, emesis/ dental abscess  Discharge Date: 24 RARS: Readmission Risk Score: 17.7      Needs to be reviewed by the provider   Additional needs identified to be addressed with provider: No  none             Method of communication with provider: none.    2nd attempt to reach patient for Care Transitions. No answer and voice mail box full.  Will attempt next week and if no answer will hand off to ODALIS Cervantes RN        Follow Up  Future Appointments   Date Time Provider Department Center   2024  9:40 AM Karen Cardoso APRN - CNP [facility] [place]   2024  1:30 PM [facility]       Care Transitions Subsequent and Final Call    Subsequent and Final Calls  Care Transitions Interventions  Other Interventions:           Plan for next call: symptom management-follow up on syncope/abdominal pain, dental abscess.  Did she make an appointment with dentist and cardio  medication management-did PCP review and correct her medications    ISH WATKINS, RN

## 2024-01-23 ENCOUNTER — CARE COORDINATION (OUTPATIENT)
Dept: CASE MANAGEMENT | Age: 74
End: 2024-01-23

## 2024-01-23 NOTE — CARE COORDINATION
Care Transitions Follow Up Call      Patient: Mariangel Abreu  Patient : 1950   MRN: [id number]  Reason for Admission: Syncope and collapse, abdominal pain, emesis/ dental abscess  Discharge Date: 24 RARS: Readmission Risk Score: 17.7      Needs to be reviewed by the provider   Additional needs identified to be addressed with provider: No  none             Method of communication with provider: none.    Final attempt to reach patient for care transitions. LM requesting return call. Contact information provided. Episode resolved at this time.  Hand off to Sj JACOBO      Follow Up  Future Appointments   Date Time Provider Department Center   2024  9:40 AM Kaern Cardoso APRN - CNP [facility] [place]   2024  1:30 PM Carla Chua APRN - CNP [facility]        Care Transitions Subsequent and Final Call    Subsequent and Final Calls  Care Transitions Interventions  Other Interventions:               ISH WATKINS RN

## 2024-01-24 DIAGNOSIS — I25.83 CORONARY ARTERY DISEASE DUE TO LIPID RICH PLAQUE: ICD-10-CM

## 2024-01-24 DIAGNOSIS — I25.10 CORONARY ARTERY DISEASE DUE TO LIPID RICH PLAQUE: ICD-10-CM

## 2024-01-24 RX ORDER — CLOPIDOGREL BISULFATE 75 MG/1
TABLET ORAL
Qty: 90 TABLET | Refills: 1 | OUTPATIENT
Start: 2024-01-24

## 2024-01-26 ENCOUNTER — TELEPHONE (OUTPATIENT)
Dept: PRIMARY CARE CLINIC | Age: 74
End: 2024-01-26

## 2024-01-26 DIAGNOSIS — J44.1 CHRONIC OBSTRUCTIVE PULMONARY DISEASE WITH ACUTE EXACERBATION (HCC): Primary | ICD-10-CM

## 2024-01-26 RX ORDER — FLUTICASONE FUROATE AND VILANTEROL 100; 25 UG/1; UG/1
1 POWDER RESPIRATORY (INHALATION) DAILY
Qty: 1 EACH | Refills: 2 | Status: SHIPPED | OUTPATIENT
Start: 2024-01-26

## 2024-01-26 NOTE — TELEPHONE ENCOUNTER
Pharmacy called stating insurance will not cover Symbicort but they will cover Breo. Please send new RX

## 2024-01-29 ENCOUNTER — CARE COORDINATION (OUTPATIENT)
Dept: CARE COORDINATION | Age: 74
End: 2024-01-29

## 2024-01-29 SDOH — ECONOMIC STABILITY: HOUSING INSECURITY: IN THE LAST 12 MONTHS, HOW MANY PLACES HAVE YOU LIVED?: 1

## 2024-01-29 SDOH — ECONOMIC STABILITY: FOOD INSECURITY: WITHIN THE PAST 12 MONTHS, YOU WORRIED THAT YOUR FOOD WOULD RUN OUT BEFORE YOU GOT MONEY TO BUY MORE.: NEVER TRUE

## 2024-01-29 SDOH — ECONOMIC STABILITY: INCOME INSECURITY: IN THE LAST 12 MONTHS, WAS THERE A TIME WHEN YOU WERE NOT ABLE TO PAY THE MORTGAGE OR RENT ON TIME?: NO

## 2024-01-29 SDOH — ECONOMIC STABILITY: FOOD INSECURITY: WITHIN THE PAST 12 MONTHS, THE FOOD YOU BOUGHT JUST DIDN'T LAST AND YOU DIDN'T HAVE MONEY TO GET MORE.: NEVER TRUE

## 2024-01-29 SDOH — ECONOMIC STABILITY: TRANSPORTATION INSECURITY
IN THE PAST 12 MONTHS, HAS LACK OF TRANSPORTATION KEPT YOU FROM MEETINGS, WORK, OR FROM GETTING THINGS NEEDED FOR DAILY LIVING?: NO

## 2024-01-29 SDOH — ECONOMIC STABILITY: TRANSPORTATION INSECURITY
IN THE PAST 12 MONTHS, HAS THE LACK OF TRANSPORTATION KEPT YOU FROM MEDICAL APPOINTMENTS OR FROM GETTING MEDICATIONS?: NO

## 2024-01-29 ASSESSMENT — ENCOUNTER SYMPTOMS: DYSPNEA ASSOCIATED WITH: EXERTION

## 2024-01-29 ASSESSMENT — SOCIAL DETERMINANTS OF HEALTH (SDOH)
DO YOU BELONG TO ANY CLUBS OR ORGANIZATIONS SUCH AS CHURCH GROUPS UNIONS, FRATERNAL OR ATHLETIC GROUPS, OR SCHOOL GROUPS?: NO
HOW OFTEN DO YOU ATTEND CHURCH OR RELIGIOUS SERVICES?: NEVER
HOW OFTEN DO YOU ATTENT MEETINGS OF THE CLUB OR ORGANIZATION YOU BELONG TO?: NEVER
IN A TYPICAL WEEK, HOW MANY TIMES DO YOU TALK ON THE PHONE WITH FAMILY, FRIENDS, OR NEIGHBORS?: MORE THAN THREE TIMES A WEEK
HOW OFTEN DO YOU GET TOGETHER WITH FRIENDS OR RELATIVES?: MORE THAN THREE TIMES A WEEK

## 2024-01-29 NOTE — CARE COORDINATION
often do you test your blood sugar?: Bedtime, Meals   Do you have barriers with adherence to non-pharmacologic self-management interventions? (Nutrition/Exercise/Self-Monitoring): No   Have you ever had to go to the ED for symptoms of low blood sugar?: No       No patient-reported symptoms   Do you have hyperglycemia symptoms?: No   Do you have hypoglycemia symptoms?: No   Last Blood Sugar Value: 301   Blood Sugar Monitoring Regimen: Before Meals, Morning Fasting, At Bedtime   Blood Sugar Trends: Fluctuating        ,   Congestive Heart Failure Assessment    Are you currently restricting fluids?: No Restriction  Do you understand a low sodium diet?: Yes  Do you understand how to read food labels?: Yes  How many restaurant meals do you eat per week?: 0  Do you salt your food before tasting it?: No     No patient-reported symptoms      Symptoms:  None: Yes      Symptom course: stable  Patient-reported weight (lb): 229  Weight trend: stable  Salt intake watch compared to last visit: stable     ,   COPD Assessment    Does the patient understand envrionmental exposure?: Yes  Is the patient able to verbalize Rescue vs. Long Acting medications?: Yes  Does the patient have a nebulizer?: Yes  Does the patient use a space with inhaled medications?: No     No patient-reported symptoms         Symptoms:  None: Yes      Symptom course: stable  Breathlessness: exertion  Increase use of rapid acting/rescue inhaled medications?: No  Change in chronic cough?: No/At Baseline  Change in sputum?: No/At Baseline  Self Monitoring - SaO2: No  Baseline SaO2 Readin  Have you had a recent diagnosis of pneumonia either by PCP or at a hospital?: No     , and   Hypertension - Encounter Level    Symptom course: no change

## 2024-01-30 ENCOUNTER — CARE COORDINATION (OUTPATIENT)
Dept: PRIMARY CARE CLINIC | Age: 74
End: 2024-01-30

## 2024-01-30 DIAGNOSIS — J44.9 CHRONIC OBSTRUCTIVE PULMONARY DISEASE, UNSPECIFIED COPD TYPE (HCC): ICD-10-CM

## 2024-01-30 DIAGNOSIS — I10 ESSENTIAL HYPERTENSION: ICD-10-CM

## 2024-01-30 DIAGNOSIS — I50.32 CHRONIC DIASTOLIC CONGESTIVE HEART FAILURE (HCC): ICD-10-CM

## 2024-01-30 DIAGNOSIS — Z79.4 TYPE 2 DIABETES MELLITUS WITH DIABETIC POLYNEUROPATHY, WITH LONG-TERM CURRENT USE OF INSULIN (HCC): Chronic | ICD-10-CM

## 2024-01-30 DIAGNOSIS — E11.42 TYPE 2 DIABETES MELLITUS WITH DIABETIC POLYNEUROPATHY, WITH LONG-TERM CURRENT USE OF INSULIN (HCC): Chronic | ICD-10-CM

## 2024-01-30 DIAGNOSIS — N18.30 STAGE 3 CHRONIC KIDNEY DISEASE, UNSPECIFIED WHETHER STAGE 3A OR 3B CKD (HCC): Primary | ICD-10-CM

## 2024-01-30 NOTE — PROGRESS NOTES
Remote Patient Monitoring Treatment Plan    Received request from AC/CTN Travon Cervantes RN  to order remote patient monitoring for in home monitoring of Kidney Disease; Condition managed by PCP?  CHF; Condition managed by PCP?  COPD; Condition managed by PCP.  Diabetes; Condition managed by PCP.  HTN; Condition managed by PCP.  and order completed.     Patient will be monitoring blood pressure   glucose  pulse ox   weight.      Patient will engage in Remote Patient Monitoring each day to develop the skills necessary for self management.       RPM Care Team Responsibilities:   Alerts will be reviewed daily and addressed within 2-4 hours during operational hours (Monday -Friday 9 am-4 pm)  Alert response and intervention documented in patient medical record  Alert response escalated to PCP per protocol and documented in patient medical record  Patient monitored over approximately  days  Discharge from program based on self-management readiness    See care coordination encounters for additional details.

## 2024-01-30 NOTE — CARE COORDINATION
Remote Patient Kit Ordering Note      Date/Time:  1/30/2024 9:02 AM      [] CCSS confirmed patient shipping address  [] Patient will receive package over the next 1-3 business days. Someone 21 years or older must be present to sign for UPS delivery.  [] HRS will contact patient within 24 hours, an HRS  will call the patient directly: If the patient does not answer, HRS will follow up with the clinical team notifying them about the unsuccessful attempt to contact the patient. HRS will make three call attempts to the patient.Provide patient with [facility] Virtual install number is: [phone].  [] ACM will contact patient once equipment is active to welcome them to the program.                                                         [] Hours of RPM monitoring - Monday-Friday 4709-6104; encourage patient to get vitals entered by Noon each day to have the alert addressed same day.  [x][facility] mailed RPM Patient flyer to patient.                     All questions answered at this time. ACM made aware the RPM kit has been ordered.      [facility].  Unable to reach the patient and voicemail message was left.

## 2024-01-31 ENCOUNTER — CARE COORDINATION (OUTPATIENT)
Dept: CARE COORDINATION | Age: 74
End: 2024-01-31

## 2024-01-31 NOTE — CARE COORDINATION
Ambulatory Care Coordination Note  2024    Patient Current Location:  Home: [address]  [place] [id number]     ACM contacted the patient by telephone. Verified name and  with patient as identifiers. Provided introduction to self, and explanation of the ACM role.     Challenges to be reviewed by the provider   Additional needs identified to be addressed with provider: No  none               Method of communication with provider: none.    ACM: Travon Cervantes, RN    Spoke with patient who states that she is feeling better the last couple of days. Cold Sx are pretty much gone. Completed ACM enrollment. Reminded her of RPM equipment arriving in the next few days. Currently has no needs. Will F/U in about 1 week    DM - uncontrolled A1C 14.7  HTN - stable  COPD - O2 PRN  pulse ox 93-95%  CHF - weight stable at 229    Offered patient enrollment in the Remote Patient Monitoring (RPM) program for in-home monitoring: Yes, patient already enrolled.    Lab Results       None                 Goals Addressed    None         Future Appointments   Date Time Provider Department Center   2024  3:00 PM Karen Cardoso APRN - CNP [facility] [place]   2024  1:30 PM Carla Chua APRN - CNP [facility]

## 2024-02-01 ENCOUNTER — HOSPITAL ENCOUNTER (INPATIENT)
Age: 74
LOS: 7 days | Discharge: NURSING, REHAB OR LONG-TERM CARE | DRG: 322 | End: 2024-02-08
Attending: EMERGENCY MEDICINE | Admitting: INTERNAL MEDICINE
Payer: MEDICARE

## 2024-02-01 ENCOUNTER — APPOINTMENT (OUTPATIENT)
Dept: GENERAL RADIOLOGY | Age: 74
DRG: 322 | End: 2024-02-01
Payer: MEDICARE

## 2024-02-01 DIAGNOSIS — I21.11 ST ELEVATION MYOCARDIAL INFARCTION INVOLVING RIGHT CORONARY ARTERY (HCC): ICD-10-CM

## 2024-02-01 DIAGNOSIS — I21.3 ST ELEVATION MYOCARDIAL INFARCTION (STEMI), UNSPECIFIED ARTERY (HCC): Primary | ICD-10-CM

## 2024-02-01 DIAGNOSIS — Z95.5 S/P PRIMARY ANGIOPLASTY WITH CORONARY STENT: ICD-10-CM

## 2024-02-01 LAB
ANION GAP SERPL CALCULATED.3IONS-SCNC: 15 MMOL/L (ref 9–17)
ANTI-XA UNFRAC HEPARIN: <0.1 IU/L
BASOPHILS # BLD: 0.07 K/UL (ref 0–0.2)
BASOPHILS NFR BLD: 1 % (ref 0–2)
BNP SERPL-MCNC: 308 PG/ML
BUN BLD-MCNC: 19 MG/DL (ref 8–26)
BUN SERPL-MCNC: 19 MG/DL (ref 8–23)
CA-I BLD-SCNC: 1.23 MMOL/L (ref 1.15–1.33)
CALCIUM SERPL-MCNC: 9.6 MG/DL (ref 8.6–10.4)
CHLORIDE BLD-SCNC: 99 MMOL/L (ref 98–107)
CHLORIDE SERPL-SCNC: 94 MMOL/L (ref 98–107)
CO2 BLD CALC-SCNC: 27 MMOL/L (ref 22–30)
CO2 SERPL-SCNC: 23 MMOL/L (ref 20–31)
CREAT SERPL-MCNC: 0.8 MG/DL (ref 0.5–0.9)
EGFR, POC: >60 ML/MIN/1.73M2
EOSINOPHIL # BLD: <0.03 K/UL (ref 0–0.44)
EOSINOPHILS RELATIVE PERCENT: 0 % (ref 1–4)
ERYTHROCYTE [DISTWIDTH] IN BLOOD BY AUTOMATED COUNT: 12.7 % (ref 11.8–14.4)
GFR SERPL CREATININE-BSD FRML MDRD: >60 ML/MIN/1.73M2
GLUCOSE BLD-MCNC: 355 MG/DL (ref 65–105)
GLUCOSE BLD-MCNC: 361 MG/DL (ref 65–105)
GLUCOSE BLD-MCNC: 374 MG/DL (ref 65–105)
GLUCOSE BLD-MCNC: 427 MG/DL (ref 65–105)
GLUCOSE BLD-MCNC: 434 MG/DL (ref 65–105)
GLUCOSE BLD-MCNC: 457 MG/DL (ref 65–105)
GLUCOSE BLD-MCNC: 477 MG/DL (ref 74–100)
GLUCOSE SERPL-MCNC: 434 MG/DL (ref 70–99)
HCO3 VENOUS: 27.8 MMOL/L (ref 22–29)
HCT VFR BLD AUTO: 44.8 % (ref 36.3–47.1)
HCT VFR BLD AUTO: 50 % (ref 36–46)
HGB BLD-MCNC: 15.2 G/DL (ref 11.9–15.1)
IMM GRANULOCYTES # BLD AUTO: 0.04 K/UL (ref 0–0.3)
IMM GRANULOCYTES NFR BLD: 0 %
INR PPP: 1
LYMPHOCYTES NFR BLD: 1.1 K/UL (ref 1.1–3.7)
LYMPHOCYTES RELATIVE PERCENT: 12 % (ref 24–43)
MAGNESIUM SERPL-MCNC: 1.5 MG/DL (ref 1.6–2.6)
MAGNESIUM SERPL-MCNC: 1.7 MG/DL (ref 1.6–2.6)
MCH RBC QN AUTO: 30 PG (ref 25.2–33.5)
MCHC RBC AUTO-ENTMCNC: 33.9 G/DL (ref 28.4–34.8)
MCV RBC AUTO: 88.4 FL (ref 82.6–102.9)
MONOCYTES NFR BLD: 0.52 K/UL (ref 0.1–1.2)
MONOCYTES NFR BLD: 5 % (ref 3–12)
NEUTROPHILS NFR BLD: 82 % (ref 36–65)
NEUTS SEG NFR BLD: 7.81 K/UL (ref 1.5–8.1)
NRBC BLD-RTO: 0 PER 100 WBC
O2 SAT, VEN: 81.7 % (ref 60–85)
PARTIAL THROMBOPLASTIN TIME: 26 SEC (ref 23–36.5)
PCO2, VEN: 53.5 MM HG (ref 41–51)
PH VENOUS: 7.32 (ref 7.32–7.43)
PLATELET # BLD AUTO: 190 K/UL (ref 138–453)
PMV BLD AUTO: 11.9 FL (ref 8.1–13.5)
PO2, VEN: 50.5 MM HG (ref 30–50)
POC ANION GAP: 11 MMOL/L (ref 7–16)
POC CREATININE: 0.8 MG/DL (ref 0.51–1.19)
POC HEMOGLOBIN (CALC): 17.2 G/DL (ref 12–16)
POC LACTIC ACID: 1.4 MMOL/L (ref 0.56–1.39)
POSITIVE BASE EXCESS, VEN: 0.4 MMOL/L (ref 0–3)
POTASSIUM BLD-SCNC: 4.6 MMOL/L (ref 3.5–4.5)
POTASSIUM SERPL-SCNC: 4.4 MMOL/L (ref 3.7–5.3)
PROTHROMBIN TIME: 13.1 SEC (ref 11.7–14.9)
RBC # BLD AUTO: 5.07 M/UL (ref 3.95–5.11)
SODIUM BLD-SCNC: 136 MMOL/L (ref 138–146)
SODIUM SERPL-SCNC: 132 MMOL/L (ref 135–144)
TROPONIN I SERPL HS-MCNC: 358 NG/L (ref 0–14)
WBC OTHER # BLD: 9.6 K/UL (ref 3.5–11.3)

## 2024-02-01 PROCEDURE — 83880 ASSAY OF NATRIURETIC PEPTIDE: CPT

## 2024-02-01 PROCEDURE — 94761 N-INVAS EAR/PLS OXIMETRY MLT: CPT

## 2024-02-01 PROCEDURE — 6370000000 HC RX 637 (ALT 250 FOR IP): Performed by: STUDENT IN AN ORGANIZED HEALTH CARE EDUCATION/TRAINING PROGRAM

## 2024-02-01 PROCEDURE — 6360000002 HC RX W HCPCS: Performed by: STUDENT IN AN ORGANIZED HEALTH CARE EDUCATION/TRAINING PROGRAM

## 2024-02-01 PROCEDURE — 82565 ASSAY OF CREATININE: CPT

## 2024-02-01 PROCEDURE — 6370000000 HC RX 637 (ALT 250 FOR IP): Performed by: INTERNAL MEDICINE

## 2024-02-01 PROCEDURE — 85025 COMPLETE CBC W/AUTO DIFF WBC: CPT

## 2024-02-01 PROCEDURE — 85730 THROMBOPLASTIN TIME PARTIAL: CPT

## 2024-02-01 PROCEDURE — 82803 BLOOD GASES ANY COMBINATION: CPT

## 2024-02-01 PROCEDURE — 85610 PROTHROMBIN TIME: CPT

## 2024-02-01 PROCEDURE — B2151ZZ FLUOROSCOPY OF LEFT HEART USING LOW OSMOLAR CONTRAST: ICD-10-PCS | Performed by: INTERNAL MEDICINE

## 2024-02-01 PROCEDURE — 027035Z DILATION OF CORONARY ARTERY, ONE ARTERY WITH TWO DRUG-ELUTING INTRALUMINAL DEVICES, PERCUTANEOUS APPROACH: ICD-10-PCS | Performed by: INTERNAL MEDICINE

## 2024-02-01 PROCEDURE — 80048 BASIC METABOLIC PNL TOTAL CA: CPT

## 2024-02-01 PROCEDURE — 93458 L HRT ARTERY/VENTRICLE ANGIO: CPT | Performed by: INTERNAL MEDICINE

## 2024-02-01 PROCEDURE — 83605 ASSAY OF LACTIC ACID: CPT

## 2024-02-01 PROCEDURE — B2111ZZ FLUOROSCOPY OF MULTIPLE CORONARY ARTERIES USING LOW OSMOLAR CONTRAST: ICD-10-PCS | Performed by: INTERNAL MEDICINE

## 2024-02-01 PROCEDURE — 2000000000 HC ICU R&B

## 2024-02-01 PROCEDURE — 85014 HEMATOCRIT: CPT

## 2024-02-01 PROCEDURE — 84520 ASSAY OF UREA NITROGEN: CPT

## 2024-02-01 PROCEDURE — 4A023N7 MEASUREMENT OF CARDIAC SAMPLING AND PRESSURE, LEFT HEART, PERCUTANEOUS APPROACH: ICD-10-PCS | Performed by: INTERNAL MEDICINE

## 2024-02-01 PROCEDURE — 83735 ASSAY OF MAGNESIUM: CPT

## 2024-02-01 PROCEDURE — 99223 1ST HOSP IP/OBS HIGH 75: CPT | Performed by: INTERNAL MEDICINE

## 2024-02-01 PROCEDURE — 2700000000 HC OXYGEN THERAPY PER DAY

## 2024-02-01 PROCEDURE — 2500000003 HC RX 250 WO HCPCS: Performed by: INTERNAL MEDICINE

## 2024-02-01 PROCEDURE — 85520 HEPARIN ASSAY: CPT

## 2024-02-01 PROCEDURE — 84484 ASSAY OF TROPONIN QUANT: CPT

## 2024-02-01 PROCEDURE — 71045 X-RAY EXAM CHEST 1 VIEW: CPT

## 2024-02-01 PROCEDURE — 2580000003 HC RX 258: Performed by: STUDENT IN AN ORGANIZED HEALTH CARE EDUCATION/TRAINING PROGRAM

## 2024-02-01 PROCEDURE — 2580000003 HC RX 258: Performed by: INTERNAL MEDICINE

## 2024-02-01 PROCEDURE — 99222 1ST HOSP IP/OBS MODERATE 55: CPT | Performed by: NURSE PRACTITIONER

## 2024-02-01 PROCEDURE — 6360000002 HC RX W HCPCS: Performed by: INTERNAL MEDICINE

## 2024-02-01 PROCEDURE — 92928 PRQ TCAT PLMT NTRAC ST 1 LES: CPT | Performed by: INTERNAL MEDICINE

## 2024-02-01 PROCEDURE — 2709999900 HC NON-CHARGEABLE SUPPLY: Performed by: INTERNAL MEDICINE

## 2024-02-01 PROCEDURE — 82947 ASSAY GLUCOSE BLOOD QUANT: CPT

## 2024-02-01 PROCEDURE — C1769 GUIDE WIRE: HCPCS | Performed by: INTERNAL MEDICINE

## 2024-02-01 PROCEDURE — 93005 ELECTROCARDIOGRAM TRACING: CPT | Performed by: STUDENT IN AN ORGANIZED HEALTH CARE EDUCATION/TRAINING PROGRAM

## 2024-02-01 PROCEDURE — 36415 COLL VENOUS BLD VENIPUNCTURE: CPT

## 2024-02-01 PROCEDURE — 6370000000 HC RX 637 (ALT 250 FOR IP): Performed by: NURSE PRACTITIONER

## 2024-02-01 PROCEDURE — C1725 CATH, TRANSLUMIN NON-LASER: HCPCS | Performed by: INTERNAL MEDICINE

## 2024-02-01 PROCEDURE — C1874 STENT, COATED/COV W/DEL SYS: HCPCS | Performed by: INTERNAL MEDICINE

## 2024-02-01 PROCEDURE — 80051 ELECTROLYTE PANEL: CPT

## 2024-02-01 PROCEDURE — 82330 ASSAY OF CALCIUM: CPT

## 2024-02-01 PROCEDURE — 99285 EMERGENCY DEPT VISIT HI MDM: CPT

## 2024-02-01 PROCEDURE — C9606 PERC D-E COR REVASC W AMI S: HCPCS | Performed by: INTERNAL MEDICINE

## 2024-02-01 DEVICE — STENT CORONARY ONYX FRONTIER RX 3X38 MM ZOTAROLIMUS ELUT: Type: IMPLANTABLE DEVICE | Status: FUNCTIONAL

## 2024-02-01 DEVICE — STENT CORONARY ONYX FRONTIER RX 3.5X18 MM ZOTAROLIMUS ELUT: Type: IMPLANTABLE DEVICE | Status: FUNCTIONAL

## 2024-02-01 RX ORDER — INSULIN LISPRO 100 [IU]/ML
0-4 INJECTION, SOLUTION INTRAVENOUS; SUBCUTANEOUS NIGHTLY
Status: DISCONTINUED | OUTPATIENT
Start: 2024-02-01 | End: 2024-02-02

## 2024-02-01 RX ORDER — ATORVASTATIN CALCIUM 40 MG/1
40 TABLET, FILM COATED ORAL NIGHTLY
Status: DISCONTINUED | OUTPATIENT
Start: 2024-02-01 | End: 2024-02-01

## 2024-02-01 RX ORDER — ONDANSETRON 2 MG/ML
4 INJECTION INTRAMUSCULAR; INTRAVENOUS EVERY 6 HOURS PRN
Status: DISCONTINUED | OUTPATIENT
Start: 2024-02-01 | End: 2024-02-08 | Stop reason: HOSPADM

## 2024-02-01 RX ORDER — DOCUSATE SODIUM 100 MG/1
100 CAPSULE, LIQUID FILLED ORAL 2 TIMES DAILY
Status: DISCONTINUED | OUTPATIENT
Start: 2024-02-01 | End: 2024-02-08 | Stop reason: HOSPADM

## 2024-02-01 RX ORDER — ACETAMINOPHEN 650 MG/1
650 SUPPOSITORY RECTAL EVERY 6 HOURS PRN
Status: DISCONTINUED | OUTPATIENT
Start: 2024-02-01 | End: 2024-02-08 | Stop reason: HOSPADM

## 2024-02-01 RX ORDER — HEPARIN SODIUM 1000 [USP'U]/ML
4000 INJECTION, SOLUTION INTRAVENOUS; SUBCUTANEOUS PRN
Status: DISCONTINUED | OUTPATIENT
Start: 2024-02-01 | End: 2024-02-02

## 2024-02-01 RX ORDER — OXYBUTYNIN CHLORIDE 5 MG/1
5 TABLET, EXTENDED RELEASE ORAL DAILY
Status: DISCONTINUED | OUTPATIENT
Start: 2024-02-01 | End: 2024-02-08 | Stop reason: HOSPADM

## 2024-02-01 RX ORDER — IPRATROPIUM BROMIDE AND ALBUTEROL SULFATE 2.5; .5 MG/3ML; MG/3ML
1 SOLUTION RESPIRATORY (INHALATION) EVERY 4 HOURS PRN
Status: DISCONTINUED | OUTPATIENT
Start: 2024-02-01 | End: 2024-02-01

## 2024-02-01 RX ORDER — HEPARIN SODIUM 1000 [USP'U]/ML
4000 INJECTION, SOLUTION INTRAVENOUS; SUBCUTANEOUS ONCE
Status: DISCONTINUED | OUTPATIENT
Start: 2024-02-01 | End: 2024-02-02

## 2024-02-01 RX ORDER — ALOGLIPTIN 12.5 MG/1
12.5 TABLET, FILM COATED ORAL DAILY
Status: DISCONTINUED | OUTPATIENT
Start: 2024-02-01 | End: 2024-02-01

## 2024-02-01 RX ORDER — MAGNESIUM SULFATE IN WATER 40 MG/ML
2000 INJECTION, SOLUTION INTRAVENOUS PRN
Status: DISCONTINUED | OUTPATIENT
Start: 2024-02-01 | End: 2024-02-08 | Stop reason: HOSPADM

## 2024-02-01 RX ORDER — CLOTRIMAZOLE AND BETAMETHASONE DIPROPIONATE 10; .64 MG/G; MG/G
CREAM TOPICAL 2 TIMES DAILY
Status: DISCONTINUED | OUTPATIENT
Start: 2024-02-01 | End: 2024-02-02

## 2024-02-01 RX ORDER — DEXTROSE MONOHYDRATE 100 MG/ML
INJECTION, SOLUTION INTRAVENOUS CONTINUOUS PRN
Status: DISCONTINUED | OUTPATIENT
Start: 2024-02-01 | End: 2024-02-08 | Stop reason: HOSPADM

## 2024-02-01 RX ORDER — ACETAMINOPHEN 325 MG/1
650 TABLET ORAL EVERY 6 HOURS PRN
Status: DISCONTINUED | OUTPATIENT
Start: 2024-02-01 | End: 2024-02-08 | Stop reason: HOSPADM

## 2024-02-01 RX ORDER — POTASSIUM CHLORIDE 7.45 MG/ML
10 INJECTION INTRAVENOUS PRN
Status: DISCONTINUED | OUTPATIENT
Start: 2024-02-01 | End: 2024-02-08 | Stop reason: HOSPADM

## 2024-02-01 RX ORDER — HEPARIN SODIUM 1000 [USP'U]/ML
2000 INJECTION, SOLUTION INTRAVENOUS; SUBCUTANEOUS PRN
Status: DISCONTINUED | OUTPATIENT
Start: 2024-02-01 | End: 2024-02-02

## 2024-02-01 RX ORDER — SODIUM CHLORIDE 0.9 % (FLUSH) 0.9 %
10 SYRINGE (ML) INJECTION EVERY 12 HOURS SCHEDULED
Status: DISCONTINUED | OUTPATIENT
Start: 2024-02-01 | End: 2024-02-08 | Stop reason: HOSPADM

## 2024-02-01 RX ORDER — INSULIN GLARGINE 100 [IU]/ML
25 INJECTION, SOLUTION SUBCUTANEOUS 2 TIMES DAILY
Status: DISCONTINUED | OUTPATIENT
Start: 2024-02-01 | End: 2024-02-01

## 2024-02-01 RX ORDER — DIPHENHYDRAMINE HYDROCHLORIDE 50 MG/ML
INJECTION INTRAMUSCULAR; INTRAVENOUS PRN
Status: DISCONTINUED | OUTPATIENT
Start: 2024-02-01 | End: 2024-02-01 | Stop reason: HOSPADM

## 2024-02-01 RX ORDER — ASPIRIN 81 MG/1
81 TABLET, CHEWABLE ORAL DAILY
Status: DISCONTINUED | OUTPATIENT
Start: 2024-02-01 | End: 2024-02-08 | Stop reason: HOSPADM

## 2024-02-01 RX ORDER — INSULIN GLARGINE 100 [IU]/ML
25 INJECTION, SOLUTION SUBCUTANEOUS ONCE
Status: COMPLETED | OUTPATIENT
Start: 2024-02-01 | End: 2024-02-01

## 2024-02-01 RX ORDER — ISOSORBIDE DINITRATE 10 MG/1
30 TABLET ORAL DAILY
Status: DISCONTINUED | OUTPATIENT
Start: 2024-02-01 | End: 2024-02-03

## 2024-02-01 RX ORDER — METOPROLOL TARTRATE 1 MG/ML
INJECTION, SOLUTION INTRAVENOUS PRN
Status: DISCONTINUED | OUTPATIENT
Start: 2024-02-01 | End: 2024-02-01 | Stop reason: HOSPADM

## 2024-02-01 RX ORDER — LANOLIN ALCOHOL/MO/W.PET/CERES
325 CREAM (GRAM) TOPICAL
Status: DISCONTINUED | OUTPATIENT
Start: 2024-02-02 | End: 2024-02-08 | Stop reason: HOSPADM

## 2024-02-01 RX ORDER — POTASSIUM CHLORIDE 20 MEQ/1
40 TABLET, EXTENDED RELEASE ORAL PRN
Status: DISCONTINUED | OUTPATIENT
Start: 2024-02-01 | End: 2024-02-08 | Stop reason: HOSPADM

## 2024-02-01 RX ORDER — OXYCODONE HYDROCHLORIDE AND ACETAMINOPHEN 5; 325 MG/1; MG/1
1 TABLET ORAL EVERY 8 HOURS PRN
Status: DISCONTINUED | OUTPATIENT
Start: 2024-02-01 | End: 2024-02-08 | Stop reason: HOSPADM

## 2024-02-01 RX ORDER — POLYETHYLENE GLYCOL 3350 17 G/17G
17 POWDER, FOR SOLUTION ORAL DAILY PRN
Status: DISCONTINUED | OUTPATIENT
Start: 2024-02-01 | End: 2024-02-08 | Stop reason: HOSPADM

## 2024-02-01 RX ORDER — ALBUTEROL SULFATE 2.5 MG/3ML
2.5 SOLUTION RESPIRATORY (INHALATION) EVERY 6 HOURS PRN
Status: DISCONTINUED | OUTPATIENT
Start: 2024-02-01 | End: 2024-02-08 | Stop reason: HOSPADM

## 2024-02-01 RX ORDER — INSULIN LISPRO 100 [IU]/ML
0-4 INJECTION, SOLUTION INTRAVENOUS; SUBCUTANEOUS
Status: DISCONTINUED | OUTPATIENT
Start: 2024-02-01 | End: 2024-02-02

## 2024-02-01 RX ORDER — ESCITALOPRAM OXALATE 10 MG/1
20 TABLET ORAL EVERY MORNING
Status: DISCONTINUED | OUTPATIENT
Start: 2024-02-02 | End: 2024-02-08 | Stop reason: HOSPADM

## 2024-02-01 RX ORDER — ALBUTEROL SULFATE 90 UG/1
2 AEROSOL, METERED RESPIRATORY (INHALATION) 4 TIMES DAILY PRN
Status: DISCONTINUED | OUTPATIENT
Start: 2024-02-01 | End: 2024-02-08

## 2024-02-01 RX ORDER — PANTOPRAZOLE SODIUM 40 MG/1
40 TABLET, DELAYED RELEASE ORAL 2 TIMES DAILY
Status: DISCONTINUED | OUTPATIENT
Start: 2024-02-01 | End: 2024-02-08 | Stop reason: HOSPADM

## 2024-02-01 RX ORDER — BUDESONIDE AND FORMOTEROL FUMARATE DIHYDRATE 160; 4.5 UG/1; UG/1
2 AEROSOL RESPIRATORY (INHALATION)
Status: DISCONTINUED | OUTPATIENT
Start: 2024-02-01 | End: 2024-02-08 | Stop reason: HOSPADM

## 2024-02-01 RX ORDER — SODIUM CHLORIDE 9 MG/ML
INJECTION, SOLUTION INTRAVENOUS PRN
Status: DISCONTINUED | OUTPATIENT
Start: 2024-02-01 | End: 2024-02-08 | Stop reason: HOSPADM

## 2024-02-01 RX ORDER — SODIUM CHLORIDE 0.9 % (FLUSH) 0.9 %
10 SYRINGE (ML) INJECTION PRN
Status: DISCONTINUED | OUTPATIENT
Start: 2024-02-01 | End: 2024-02-08 | Stop reason: HOSPADM

## 2024-02-01 RX ORDER — MIDAZOLAM HYDROCHLORIDE 1 MG/ML
INJECTION INTRAMUSCULAR; INTRAVENOUS PRN
Status: DISCONTINUED | OUTPATIENT
Start: 2024-02-01 | End: 2024-02-01 | Stop reason: HOSPADM

## 2024-02-01 RX ORDER — INSULIN LISPRO 100 [IU]/ML
10 INJECTION, SOLUTION INTRAVENOUS; SUBCUTANEOUS ONCE
Status: COMPLETED | OUTPATIENT
Start: 2024-02-01 | End: 2024-02-01

## 2024-02-01 RX ORDER — HYDRALAZINE HYDROCHLORIDE 20 MG/ML
INJECTION INTRAMUSCULAR; INTRAVENOUS PRN
Status: DISCONTINUED | OUTPATIENT
Start: 2024-02-01 | End: 2024-02-01 | Stop reason: HOSPADM

## 2024-02-01 RX ORDER — METHYLPREDNISOLONE SODIUM SUCCINATE 1 G/16ML
INJECTION, POWDER, LYOPHILIZED, FOR SOLUTION INTRAMUSCULAR; INTRAVENOUS PRN
Status: DISCONTINUED | OUTPATIENT
Start: 2024-02-01 | End: 2024-02-01 | Stop reason: HOSPADM

## 2024-02-01 RX ORDER — HEPARIN SODIUM 10000 [USP'U]/100ML
5-30 INJECTION, SOLUTION INTRAVENOUS CONTINUOUS
Status: DISCONTINUED | OUTPATIENT
Start: 2024-02-01 | End: 2024-02-01

## 2024-02-01 RX ORDER — INSULIN GLARGINE 100 [IU]/ML
35 INJECTION, SOLUTION SUBCUTANEOUS 2 TIMES DAILY
Status: DISCONTINUED | OUTPATIENT
Start: 2024-02-01 | End: 2024-02-04

## 2024-02-01 RX ORDER — INSULIN GLARGINE 100 [IU]/ML
55 INJECTION, SOLUTION SUBCUTANEOUS 2 TIMES DAILY
Status: DISCONTINUED | OUTPATIENT
Start: 2024-02-01 | End: 2024-02-01

## 2024-02-01 RX ORDER — DICYCLOMINE HYDROCHLORIDE 10 MG/1
10 CAPSULE ORAL 2 TIMES DAILY PRN
Status: DISCONTINUED | OUTPATIENT
Start: 2024-02-01 | End: 2024-02-08 | Stop reason: HOSPADM

## 2024-02-01 RX ORDER — ATORVASTATIN CALCIUM 40 MG/1
40 TABLET, FILM COATED ORAL DAILY
Status: DISCONTINUED | OUTPATIENT
Start: 2024-02-01 | End: 2024-02-08 | Stop reason: HOSPADM

## 2024-02-01 RX ORDER — CHOLECALCIFEROL (VITAMIN D3) 125 MCG
10 CAPSULE ORAL NIGHTLY PRN
Status: DISCONTINUED | OUTPATIENT
Start: 2024-02-01 | End: 2024-02-08 | Stop reason: HOSPADM

## 2024-02-01 RX ORDER — BIVALIRUDIN 250 MG/5ML
INJECTION, POWDER, LYOPHILIZED, FOR SOLUTION INTRAVENOUS PRN
Status: DISCONTINUED | OUTPATIENT
Start: 2024-02-01 | End: 2024-02-01 | Stop reason: HOSPADM

## 2024-02-01 RX ORDER — PROMETHAZINE HYDROCHLORIDE 12.5 MG/1
12.5 TABLET ORAL EVERY 6 HOURS PRN
Status: DISCONTINUED | OUTPATIENT
Start: 2024-02-01 | End: 2024-02-08 | Stop reason: HOSPADM

## 2024-02-01 RX ORDER — NITROGLYCERIN 20 MG/100ML
INJECTION INTRAVENOUS PRN
Status: DISCONTINUED | OUTPATIENT
Start: 2024-02-01 | End: 2024-02-01 | Stop reason: HOSPADM

## 2024-02-01 RX ORDER — TOPIRAMATE 100 MG/1
100 TABLET, FILM COATED ORAL NIGHTLY
Status: DISCONTINUED | OUTPATIENT
Start: 2024-02-01 | End: 2024-02-08 | Stop reason: HOSPADM

## 2024-02-01 RX ADMIN — INSULIN LISPRO 10 UNITS: 100 INJECTION, SOLUTION INTRAVENOUS; SUBCUTANEOUS at 18:55

## 2024-02-01 RX ADMIN — INSULIN GLARGINE 35 UNITS: 100 INJECTION, SOLUTION SUBCUTANEOUS at 20:44

## 2024-02-01 RX ADMIN — Medication 10 MG: at 20:47

## 2024-02-01 RX ADMIN — SODIUM CHLORIDE, PRESERVATIVE FREE 10 ML: 5 INJECTION INTRAVENOUS at 19:40

## 2024-02-01 RX ADMIN — SODIUM CHLORIDE 8 UNITS/HR: 9 INJECTION, SOLUTION INTRAVENOUS at 22:03

## 2024-02-01 RX ADMIN — TICAGRELOR 90 MG: 90 TABLET ORAL at 19:32

## 2024-02-01 RX ADMIN — ISOSORBIDE DINITRATE 30 MG: 10 TABLET ORAL at 17:44

## 2024-02-01 RX ADMIN — INSULIN LISPRO 4 UNITS: 100 INJECTION, SOLUTION INTRAVENOUS; SUBCUTANEOUS at 20:43

## 2024-02-01 RX ADMIN — MAGNESIUM SULFATE HEPTAHYDRATE 2000 MG: 40 INJECTION, SOLUTION INTRAVENOUS at 21:35

## 2024-02-01 RX ADMIN — INSULIN GLARGINE 25 UNITS: 100 INJECTION, SOLUTION SUBCUTANEOUS at 15:35

## 2024-02-01 RX ADMIN — DOCUSATE SODIUM 100 MG: 100 CAPSULE, LIQUID FILLED ORAL at 19:39

## 2024-02-01 RX ADMIN — TOPIRAMATE 100 MG: 100 TABLET, FILM COATED ORAL at 20:44

## 2024-02-01 RX ADMIN — OXYCODONE HYDROCHLORIDE AND ACETAMINOPHEN 1 TABLET: 5; 325 TABLET ORAL at 15:40

## 2024-02-01 RX ADMIN — ATORVASTATIN CALCIUM 40 MG: 40 TABLET, FILM COATED ORAL at 15:40

## 2024-02-01 RX ADMIN — CLOTRIMAZOLE AND BETAMETHASONE DIPROPIONATE: 10; .5 CREAM TOPICAL at 19:31

## 2024-02-01 RX ADMIN — OXYBUTYNIN CHLORIDE 5 MG: 5 TABLET, EXTENDED RELEASE ORAL at 17:44

## 2024-02-01 RX ADMIN — PANTOPRAZOLE SODIUM 40 MG: 40 TABLET, DELAYED RELEASE ORAL at 19:39

## 2024-02-01 RX ADMIN — METOPROLOL TARTRATE 12.5 MG: 50 TABLET, FILM COATED ORAL at 19:39

## 2024-02-01 ASSESSMENT — PAIN SCALES - WONG BAKER
WONGBAKER_NUMERICALRESPONSE: 0

## 2024-02-01 ASSESSMENT — PAIN SCALES - GENERAL
PAINLEVEL_OUTOF10: 0
PAINLEVEL_OUTOF10: 10
PAINLEVEL_OUTOF10: 9
PAINLEVEL_OUTOF10: 0
PAINLEVEL_OUTOF10: 0
PAINLEVEL_OUTOF10: 8
PAINLEVEL_OUTOF10: 0
PAINLEVEL_OUTOF10: 0

## 2024-02-01 ASSESSMENT — PAIN DESCRIPTION - DESCRIPTORS
DESCRIPTORS: DISCOMFORT;PRESSURE
DESCRIPTORS: PRESSURE

## 2024-02-01 ASSESSMENT — PAIN DESCRIPTION - ORIENTATION
ORIENTATION: MID
ORIENTATION: MID

## 2024-02-01 ASSESSMENT — PAIN DESCRIPTION - LOCATION
LOCATION: CHEST
LOCATION: CHEST

## 2024-02-01 NOTE — ED NOTES
Awaiting verification of Heparin for dosage before cath lab/not verified, radha cath lab without Heparin dose REUBEN Curtis cath lab aware

## 2024-02-01 NOTE — H&P
[facility]   H&P               Today's Date: 2/1/2024  Patient Name: Mariangel Abreu  Date of admission: 2/1/2024 12:34 PM  Patient's age: 73 y.o., 1950  Admission Dx: No admission diagnoses are documented for this encounter.      History Obtained From: patient/chart review     History of Present Illness:    This patient 73 y.o. years old female with past medical history as below.     Patient presented to ER for evaluation of chest pain.  EKG showing inferior STEs concerning for STEMI.  Patient was referred for emergent coronary angiography and possible PCI.    Past Medical History:   has a past medical history of Abdominal bloating, Adenomatous polyp of ascending colon, Allergic rhinitis, Anxiety, Arthritis, Asthma, Atrial fibrillation ([facility]), Back pain, Benign hypertension with CKD (chronic kidney disease) stage III ([facility]), CAD (coronary artery disease), Caffeine use, CHF (congestive heart failure) ([facility]), Chronic kidney disease, CKD (chronic kidney disease) stage 3, GFR 30-59 ml/min ([facility]), COPD (chronic obstructive pulmonary disease) ([facility]), Degeneration of lumbar or lumbosacral intervertebral disc, Depression, DM (diabetes mellitus) ([facility]), Emphysema of lung ([facility]), Gastritis, GERD (gastroesophageal reflux disease), Hearing loss, Hematuria, Hiatal hernia, HTN (hypertension), benign, Hypertriglyceridemia, Incontinence of urine, Irritable bowel syndrome with both constipation and diarrhea, Kidney stone, Knee arthropathy, Lumbosacral spondylosis without myelopathy, Migraine headache, Mumps, Neuropathy, Obesity, On home oxygen therapy, HIGINIO on CPAP, Otitis media, Secondary diabetes mellitus with stage 3 chronic kidney disease (GFR 30-59) ([facility]), Stroke ([facility]), Tinnitus, Type 2 diabetes mellitus without complication ([facility]), Type II or unspecified type diabetes mellitus without mention of complication, not stated as uncontrolled, UTI (lower urinary tract infection), and Varicose vein.    Past  and function without stenosis or  regurgitation.  Tricuspid Valve  Normal tricuspid valve structure and function.  No tricuspid regurgitation.  Pulmonic Valve  The pulmonic valve is normal in structure.  No pulmonic insufficiency.  Pericardial Effusion  Anterior echo free space suggestive of adipose tissue is seen.    Miscellaneous  E/E' average = 11.9.  IVC normal diameter & inspiratory collapse indicating normal RA filling  pressure .    M-mode / 2D Measurements & Calculations:      LVIDd:4.4 cm(3.7 - 5.6 cm)       Diastolic Volume:26 ml   LVIDs:2.9 cm(2.2 - 4.0 cm)       Systolic Volume:6.12 ml   IVSd:0.9 cm(0.6 - 1.1 cm)        Aortic Root:2.7 cm(2.0 - 3.7 cm)   LVPWd:1.1 cm(0.6 - 1.1 cm)       LA Dimension: 3.7 cm(1.9 - 4.0 cm)   Fractional Shortenin.09 %    LA volume/Index: 42.2 ml /20m^2   Calculated LVEF (%): 76.46 %     LVOT:1.9 cm                                    RVDd:3.2 cm      Mitral:                                 Aortic      Valve Area (P1/2-Time): 3.14 cm^2       Peak Velocity: 1.59 m/s   Peak E-Wave: 0.90 m/s                   Mean Velocity: 1.06 m/s   Peak A-Wave: 0.84 m/s                   Peak Gradient: 10.11 mmHg   E/A Ratio: 1.08                         Mean Gradient: 5 mmHg   Peak Gradient: 3.27 mmHg   Mean Gradient: 2 mmHg   Deceleration Time: 238 msec             Area (continuity): 2.18 cm^2   P1/2t: 70 msec                          AV VTI: 34.2 cm      Area (continuity): 2.5 cm^2   Mean Velocity: 0.66 m/s                                              Pulmonic:                                              Peak Velocity: 1.07 m/s                                           Peak Gradient: 4.58 mmHg     Diastology / Tissue Doppler  Septal Wall E' velocity:0.08 m/s  Septal Wall E/E':12  Lateral Wall E' velocity:0.08 m/s  Lateral Wall E/E':11.9       23    ECHO (TTE) COMPLETE (CONTRAST/BUBBLE/3D PRN) 2023  4:28 PM (Final)    Interpretation Summary    Left Ventricle: Normal left  chlorhexidine

## 2024-02-01 NOTE — CONSULTS
[facility]  Office: [phone]  Goldy [place] DO, Ferdinand Villeda DO, Jose Goff DO, Alejo Mao DO, Mainor Smith MD, Melony Rodriguez MD, Tonia Brenner MD, Alethea Ramsey MD,  Rahul Zheng MD, Josi Saldana MD, Pedro Deras MD,  Lauren Aocsta DO, Randee Pantoja MD, Raji Reddy MD, Praveen Moore DO, Veronica Duff MD,  Spencer Ferguson DO, Olivia Boss MD, Veronika Lin MD, Caryl Zacarias MD, Slick Jean MD,  Tacos Yang MD, Guzman Stein MD, Vicente Yee MD, Mikcey Avila MD, Conrad Bailey MD, Raisa Felder MD, Travon Blanchard DO, Joel Rome DO, Mariel Francisco MD,  Mika Valentin MD, Shirley Waterhouse, CNP,  Isamar Seals CNP, Jose Warren, CNP,  Jenna Angulo, NAKUL, Jeannette Bobo, CNP, Sofya Rose, CNP, Zoe Coates CNP, Lara Yan CNP, Michelle Gracía, CNP, Tamara Issa, PA-C, Halina Lynch PA-C, Keri Weaver, CNP, Cheyanne White, CNS, Heide Collier, CNP, Amber Mast, CNP, Tracy Schwab, CNP         [facility]   IN-PATIENT SERVICE   [facility]    CONSULTATION / HISTORY AND PHYSICAL EXAMINATION            Date:   2/1/2024  Patient name:  Mariangel Abreu  Date of admission:  2/1/2024 12:34 PM  MRN:   [id number]  Account:  [id number]  YOB: 1950  PCP:    Carla Chua APRN - CNP  Room:   [address] [place] [address]  Code Status:    Full Code    Physician Requesting Consult: Victoriano Dee MD    Reason for Consult:  medical management post STEMI    Chief Complaint:     Chest pain     History Obtained From:     patient, electronic medical record    History of Present Illness:     73-year-old patient with multiple medical conditions listed below who presents to the emergency department for chest pain, Was found to have an inferior STEMI taken for emergent cardiac cath where PCI with ELEANOR to mid RCA was completed.  There is residual high-grade LAD diagonal stenosis requiring staged PCI to LAD in 4 to

## 2024-02-01 NOTE — PROCEDURES
[facility]        Date:   2/1/2024  Patient name: Mariangel Abreu  Date of admission:  2/1/2024 12:34 PM  MRN:   [id number]  YOB: 1950    CARDIAC CATHETERIZATION    Operators:  Primary: Victoriano Dee MD.  Assistant:     Indications for cath: Inferior STEMI    Procedure performed: Cardiac cath.    Access: Right Femoral artery      Procedure: After informed consent was obtained with explanation of the risks and benefits, patient was brought to the cath lab. The right groin were prepped and draped in sterile fashion. 1% lidocaine was used for local block. The Femoral artery was cannulated with 6  Fr sheath with brisk arterial blood return. The side port was frequently flushed and aspirated with normal saline.    EBL is 20 mL    Dominance is right dominant    Findings:    Left main: Normal with 0% stenosis.    LAD: 80% mid stenosis  First diagonal with 80% proximal stenosis very small vessel    LCX: Luminal irregularities of 20 to 30%    RCA: 99% mid long stenosis, length is 50 mm, GELACIO II flow, underwent PCI with ELEANOR using 3.0 x 38 mm and 3.5 x 18 mm Syed stents postdilated with 3.5 NC balloon with 0% residual stenosis and GELACIO-3 flow.    The LV gram was performed in the NAVARRO 30 position.   LVEF: 45%. LV Wall Motion: Inferior apical akinesis    Conclusions:  Successful PCI with ELEANOR to mid RCA the culprit for STEMI  Residual high-grade LAD diagonal stenosis  Minimal LCx disease  Mildly reduced LV systolic function    Recommendation:  Routine post MI/PCI orders  Medical treatments.  Risk factors modifications.  Staged PCI to the LAD in 4 to 6 weeks        Electronically signed by Victoriano Dee MD on 2/1/2024 at 4:05 PM      [facility]  [phone]

## 2024-02-01 NOTE — ED NOTES
Pt to cath lab with writer, Kirsten ALEXIS, Dr. Sanchez. Pt on lifpak and meds brought with doctor

## 2024-02-01 NOTE — PROGRESS NOTES
[facility]     Emergency/Trauma Note    PATIENT NAME: Mariangel Abreu    Shift date: 02/01/2024  Shift day: Thursday   Shift # 1    Room # 1025/1025-01     Name: Mariangel Abreu            Age: 73 y.o.  Gender: female          [employer]ish: [employer]   Place of Evangelical:     Trauma/Incident type: Stemi Alert  Admit Date & Time: 2/1/2024 12:34 PM  TRAUMA NAME: None    ADVANCE DIRECTIVES IN CHART?  No    NAME OF DECISION MAKER:     RELATIONSHIP OF DECISION MAKER TO PATIENT:     PATIENT/EVENT DESCRIPTION:  Mariangel Abreu is a 73 y.o. female who arrived ER as stemi alert. Patient was taken to Cath lab shortly after she arrived. Patient to be [facility] to [facility].       SPIRITUAL ASSESSMENT-INTERVENTION-OUTCOME:  No spiritual assessment was carried out because  did not talk to patient. Family was not present at the time.  called patient's son, Aden Kirkland, at [phone] and he said he would not be able to come [facility]. Aden said he would send someone to [facility] to see patient.  offered emotional support to Aden.      PATIENT BELONGINGS:  No belongings noted    ANY BELONGINGS OF SIGNIFICANT VALUE NOTED:  Unknown    REGISTRATION STAFF NOTIFIED?  Yes    WHAT IS YOUR SPIRITUAL CARE PLAN FOR THIS PATIENT?:  Follow up visits recommended for ongoing assessment of patient's condition and for spiritual and emotional support.    Electronically signed by Chaplain Que, on 2/1/2024 at 2:13 PM.  [facility]  [phone]

## 2024-02-01 NOTE — ED PROVIDER NOTES
[facility]     Emergency Department     Faculty Attestation    I performed a history and physical examination of the patient and discussed management with the resident. I reviewed the resident’s note and agree with the documented findings and plan of care. Any areas of disagreement are noted on the chart. I was personally present for the key portions of any procedures. I have documented in the chart those procedures where I was not present during the key portions. I have reviewed the emergency nurses triage note. I agree with the chief complaint, past medical history, past surgical history, allergies, medications, social and family history as documented unless otherwise noted below.        For Physician Assistant/ Nurse Practitioner cases/documentation I have personally evaluated this patient and have completed at least one if not all key elements of the E/M (history, physical exam, and MDM). Additional findings are as noted.  I have personally seen and evaluated the patient.  I find the patient's history and physical exam are consistent with the NP/PA documentation.  I agree with the care provided, treatment rendered, disposition and follow-up plan.      EKG Interpretation    Interpreted by emergency department physician    Rhythm: normal sinus   Rate: normal  Axis: normal  Conduction: normal  ST Segments: Elevations are noted in leads II and aVF as well as reciprocal depression noted in aVL.  T Waves: no acute change  Q Waves: no acute change    Clinical Impression: ST elevations consistent with inferior myocardial infarction    STEMI alert called upon arrival    Critical Care     David Garcia M.D.  Attending Emergency  Physician    Patient reporting chest discomfort pressure onset approximately 7 hours prior to arrival.  Currently resting comfortably speaking full sentences no respiratory distress       David Garcia MD  02/01/24 0088

## 2024-02-01 NOTE — ED NOTES
Pt arriving to ed 22 via ems  Chest pain starting at 5 am, worsened through out the day  Pt thought it was gerd and only took tums  Chest pain worsened and called out for chest pain  Ems reported STEMI, 324 mg aspirin given by ems  EKG in ED showing STEMI

## 2024-02-02 ENCOUNTER — APPOINTMENT (OUTPATIENT)
Dept: GENERAL RADIOLOGY | Age: 74
DRG: 322 | End: 2024-02-02
Payer: MEDICARE

## 2024-02-02 ENCOUNTER — CARE COORDINATION (OUTPATIENT)
Dept: CARE COORDINATION | Age: 74
End: 2024-02-02

## 2024-02-02 LAB
ALBUMIN SERPL-MCNC: 3.8 G/DL (ref 3.5–5.2)
ALBUMIN/GLOB SERPL: 1.4 {RATIO} (ref 1–2.5)
ALP SERPL-CCNC: 64 U/L (ref 35–104)
ALT SERPL-CCNC: 21 U/L (ref 5–33)
ANION GAP SERPL CALCULATED.3IONS-SCNC: 12 MMOL/L (ref 9–17)
AST SERPL-CCNC: 59 U/L
BASOPHILS # BLD: <0.03 K/UL (ref 0–0.2)
BASOPHILS NFR BLD: 0 % (ref 0–2)
BILIRUB DIRECT SERPL-MCNC: 0.1 MG/DL
BILIRUB INDIRECT SERPL-MCNC: 0.2 MG/DL (ref 0–1)
BILIRUB SERPL-MCNC: 0.3 MG/DL (ref 0.3–1.2)
BILIRUB UR QL STRIP: NEGATIVE
BNP SERPL-MCNC: 2103 PG/ML
BUN SERPL-MCNC: 26 MG/DL (ref 8–23)
CALCIUM SERPL-MCNC: 9.4 MG/DL (ref 8.6–10.4)
CHLORIDE SERPL-SCNC: 100 MMOL/L (ref 98–107)
CLARITY UR: CLEAR
CO2 SERPL-SCNC: 21 MMOL/L (ref 20–31)
COLOR UR: YELLOW
COMMENT: ABNORMAL
CREAT SERPL-MCNC: 1 MG/DL (ref 0.5–0.9)
EOSINOPHIL # BLD: <0.03 K/UL (ref 0–0.44)
EOSINOPHILS RELATIVE PERCENT: 0 % (ref 1–4)
ERYTHROCYTE [DISTWIDTH] IN BLOOD BY AUTOMATED COUNT: 12.8 % (ref 11.8–14.4)
EST. AVERAGE GLUCOSE BLD GHB EST-MCNC: 358 MG/DL
GFR SERPL CREATININE-BSD FRML MDRD: 59 ML/MIN/1.73M2
GLUCOSE BLD-MCNC: 181 MG/DL (ref 65–105)
GLUCOSE BLD-MCNC: 182 MG/DL (ref 65–105)
GLUCOSE BLD-MCNC: 186 MG/DL (ref 65–105)
GLUCOSE BLD-MCNC: 198 MG/DL (ref 65–105)
GLUCOSE BLD-MCNC: 203 MG/DL (ref 65–105)
GLUCOSE BLD-MCNC: 210 MG/DL (ref 65–105)
GLUCOSE BLD-MCNC: 269 MG/DL (ref 65–105)
GLUCOSE BLD-MCNC: 306 MG/DL (ref 65–105)
GLUCOSE BLD-MCNC: 306 MG/DL (ref 65–105)
GLUCOSE BLD-MCNC: 363 MG/DL (ref 65–105)
GLUCOSE BLD-MCNC: 363 MG/DL (ref 65–105)
GLUCOSE BLD-MCNC: 400 MG/DL (ref 65–105)
GLUCOSE SERPL-MCNC: 183 MG/DL (ref 70–99)
GLUCOSE UR STRIP-MCNC: ABNORMAL MG/DL
HBA1C MFR BLD: 14.1 % (ref 4–6)
HCT VFR BLD AUTO: 43.9 % (ref 36.3–47.1)
HGB BLD-MCNC: 14.2 G/DL (ref 11.9–15.1)
HGB UR QL STRIP.AUTO: NEGATIVE
IMM GRANULOCYTES # BLD AUTO: 0.09 K/UL (ref 0–0.3)
IMM GRANULOCYTES NFR BLD: 1 %
KETONES UR STRIP-MCNC: NEGATIVE MG/DL
LEUKOCYTE ESTERASE UR QL STRIP: NEGATIVE
LYMPHOCYTES NFR BLD: 0.83 K/UL (ref 1.1–3.7)
LYMPHOCYTES RELATIVE PERCENT: 7 % (ref 24–43)
MAGNESIUM SERPL-MCNC: 2.3 MG/DL (ref 1.6–2.6)
MCH RBC QN AUTO: 30.3 PG (ref 25.2–33.5)
MCHC RBC AUTO-ENTMCNC: 32.3 G/DL (ref 28.4–34.8)
MCV RBC AUTO: 93.8 FL (ref 82.6–102.9)
MONOCYTES NFR BLD: 0.71 K/UL (ref 0.1–1.2)
MONOCYTES NFR BLD: 6 % (ref 3–12)
NEUTROPHILS NFR BLD: 87 % (ref 36–65)
NEUTS SEG NFR BLD: 11.15 K/UL (ref 1.5–8.1)
NITRITE UR QL STRIP: NEGATIVE
NRBC BLD-RTO: 0 PER 100 WBC
PH UR STRIP: 5 [PH] (ref 5–8)
PLATELET # BLD AUTO: 209 K/UL (ref 138–453)
PMV BLD AUTO: 11.7 FL (ref 8.1–13.5)
POTASSIUM SERPL-SCNC: 4.4 MMOL/L (ref 3.7–5.3)
PROT SERPL-MCNC: 6.5 G/DL (ref 6.4–8.3)
PROT UR STRIP-MCNC: NEGATIVE MG/DL
RBC # BLD AUTO: 4.68 M/UL (ref 3.95–5.11)
SODIUM SERPL-SCNC: 133 MMOL/L (ref 135–144)
SP GR UR STRIP: 1.04 (ref 1–1.03)
TROPONIN I SERPL HS-MCNC: 1951 NG/L (ref 0–14)
TROPONIN I SERPL HS-MCNC: 2190 NG/L (ref 0–14)
TROPONIN I SERPL HS-MCNC: 2536 NG/L (ref 0–14)
UROBILINOGEN UR STRIP-ACNC: NORMAL EU/DL (ref 0–1)
WBC OTHER # BLD: 12.8 K/UL (ref 3.5–11.3)

## 2024-02-02 PROCEDURE — 6360000002 HC RX W HCPCS: Performed by: INTERNAL MEDICINE

## 2024-02-02 PROCEDURE — 93005 ELECTROCARDIOGRAM TRACING: CPT | Performed by: INTERNAL MEDICINE

## 2024-02-02 PROCEDURE — 6370000000 HC RX 637 (ALT 250 FOR IP): Performed by: NURSE PRACTITIONER

## 2024-02-02 PROCEDURE — 97162 PT EVAL MOD COMPLEX 30 MIN: CPT

## 2024-02-02 PROCEDURE — 94660 CPAP INITIATION&MGMT: CPT

## 2024-02-02 PROCEDURE — 83880 ASSAY OF NATRIURETIC PEPTIDE: CPT

## 2024-02-02 PROCEDURE — 6360000002 HC RX W HCPCS: Performed by: NURSE PRACTITIONER

## 2024-02-02 PROCEDURE — 94640 AIRWAY INHALATION TREATMENT: CPT

## 2024-02-02 PROCEDURE — 36415 COLL VENOUS BLD VENIPUNCTURE: CPT

## 2024-02-02 PROCEDURE — 97530 THERAPEUTIC ACTIVITIES: CPT

## 2024-02-02 PROCEDURE — 6370000000 HC RX 637 (ALT 250 FOR IP): Performed by: INTERNAL MEDICINE

## 2024-02-02 PROCEDURE — 99233 SBSQ HOSP IP/OBS HIGH 50: CPT | Performed by: INTERNAL MEDICINE

## 2024-02-02 PROCEDURE — 6370000000 HC RX 637 (ALT 250 FOR IP): Performed by: STUDENT IN AN ORGANIZED HEALTH CARE EDUCATION/TRAINING PROGRAM

## 2024-02-02 PROCEDURE — 85025 COMPLETE CBC W/AUTO DIFF WBC: CPT

## 2024-02-02 PROCEDURE — 80048 BASIC METABOLIC PNL TOTAL CA: CPT

## 2024-02-02 PROCEDURE — 2700000000 HC OXYGEN THERAPY PER DAY

## 2024-02-02 PROCEDURE — 83735 ASSAY OF MAGNESIUM: CPT

## 2024-02-02 PROCEDURE — 84484 ASSAY OF TROPONIN QUANT: CPT

## 2024-02-02 PROCEDURE — 6370000000 HC RX 637 (ALT 250 FOR IP)

## 2024-02-02 PROCEDURE — 71045 X-RAY EXAM CHEST 1 VIEW: CPT

## 2024-02-02 PROCEDURE — 94761 N-INVAS EAR/PLS OXIMETRY MLT: CPT

## 2024-02-02 PROCEDURE — 80076 HEPATIC FUNCTION PANEL: CPT

## 2024-02-02 PROCEDURE — 2000000000 HC ICU R&B

## 2024-02-02 PROCEDURE — 83036 HEMOGLOBIN GLYCOSYLATED A1C: CPT

## 2024-02-02 PROCEDURE — 97166 OT EVAL MOD COMPLEX 45 MIN: CPT

## 2024-02-02 PROCEDURE — 82947 ASSAY GLUCOSE BLOOD QUANT: CPT

## 2024-02-02 PROCEDURE — 2580000003 HC RX 258: Performed by: STUDENT IN AN ORGANIZED HEALTH CARE EDUCATION/TRAINING PROGRAM

## 2024-02-02 PROCEDURE — 81003 URINALYSIS AUTO W/O SCOPE: CPT

## 2024-02-02 RX ORDER — BENZONATATE 100 MG/1
200 CAPSULE ORAL 3 TIMES DAILY PRN
Status: DISCONTINUED | OUTPATIENT
Start: 2024-02-02 | End: 2024-02-08 | Stop reason: HOSPADM

## 2024-02-02 RX ORDER — MIDODRINE HYDROCHLORIDE 5 MG/1
5 TABLET ORAL 2 TIMES DAILY PRN
Status: DISCONTINUED | OUTPATIENT
Start: 2024-02-02 | End: 2024-02-08 | Stop reason: HOSPADM

## 2024-02-02 RX ORDER — CLOTRIMAZOLE AND BETAMETHASONE DIPROPIONATE 10; .64 MG/G; MG/G
CREAM TOPICAL 2 TIMES DAILY
Status: DISCONTINUED | OUTPATIENT
Start: 2024-02-02 | End: 2024-02-08 | Stop reason: HOSPADM

## 2024-02-02 RX ORDER — BISMUTH SUBSALICYLATE 262 MG/1
524 TABLET, CHEWABLE ORAL
Status: DISCONTINUED | OUTPATIENT
Start: 2024-02-02 | End: 2024-02-08 | Stop reason: HOSPADM

## 2024-02-02 RX ORDER — NITROGLYCERIN 0.4 MG/1
0.4 TABLET SUBLINGUAL EVERY 5 MIN PRN
Status: DISCONTINUED | OUTPATIENT
Start: 2024-02-02 | End: 2024-02-08 | Stop reason: HOSPADM

## 2024-02-02 RX ORDER — INSULIN LISPRO 100 [IU]/ML
0-4 INJECTION, SOLUTION INTRAVENOUS; SUBCUTANEOUS NIGHTLY
Status: DISCONTINUED | OUTPATIENT
Start: 2024-02-02 | End: 2024-02-08 | Stop reason: HOSPADM

## 2024-02-02 RX ORDER — INSULIN LISPRO 100 [IU]/ML
0-16 INJECTION, SOLUTION INTRAVENOUS; SUBCUTANEOUS
Status: DISCONTINUED | OUTPATIENT
Start: 2024-02-03 | End: 2024-02-08 | Stop reason: HOSPADM

## 2024-02-02 RX ORDER — MIDODRINE HYDROCHLORIDE 5 MG/1
2.5 TABLET ORAL
Status: DISCONTINUED | OUTPATIENT
Start: 2024-02-02 | End: 2024-02-08 | Stop reason: HOSPADM

## 2024-02-02 RX ORDER — NYSTATIN AND TRIAMCINOLONE ACETONIDE 100000; 1 [USP'U]/G; MG/G
OINTMENT TOPICAL 2 TIMES DAILY PRN
Status: DISCONTINUED | OUTPATIENT
Start: 2024-02-02 | End: 2024-02-02 | Stop reason: RX

## 2024-02-02 RX ORDER — MAGNESIUM SULFATE IN WATER 40 MG/ML
2000 INJECTION, SOLUTION INTRAVENOUS ONCE
Status: COMPLETED | OUTPATIENT
Start: 2024-02-02 | End: 2024-02-02

## 2024-02-02 RX ADMIN — FERROUS SULFATE TAB EC 325 MG (65 MG FE EQUIVALENT) 325 MG: 325 (65 FE) TABLET DELAYED RESPONSE at 08:11

## 2024-02-02 RX ADMIN — DOCUSATE SODIUM 100 MG: 100 CAPSULE, LIQUID FILLED ORAL at 20:19

## 2024-02-02 RX ADMIN — ATORVASTATIN CALCIUM 40 MG: 40 TABLET, FILM COATED ORAL at 08:11

## 2024-02-02 RX ADMIN — CLOTRIMAZOLE AND BETAMETHASONE DIPROPIONATE: 10; .5 CREAM TOPICAL at 08:13

## 2024-02-02 RX ADMIN — MIDODRINE HYDROCHLORIDE 2.5 MG: 5 TABLET ORAL at 16:46

## 2024-02-02 RX ADMIN — INSULIN GLARGINE 35 UNITS: 100 INJECTION, SOLUTION SUBCUTANEOUS at 20:18

## 2024-02-02 RX ADMIN — OXYCODONE HYDROCHLORIDE AND ACETAMINOPHEN 1 TABLET: 5; 325 TABLET ORAL at 19:59

## 2024-02-02 RX ADMIN — DOCUSATE SODIUM 100 MG: 100 CAPSULE, LIQUID FILLED ORAL at 08:11

## 2024-02-02 RX ADMIN — SODIUM CHLORIDE, PRESERVATIVE FREE 10 ML: 5 INJECTION INTRAVENOUS at 20:06

## 2024-02-02 RX ADMIN — TICAGRELOR 90 MG: 90 TABLET ORAL at 08:11

## 2024-02-02 RX ADMIN — ISOSORBIDE DINITRATE 30 MG: 10 TABLET ORAL at 08:12

## 2024-02-02 RX ADMIN — INSULIN GLARGINE 35 UNITS: 100 INJECTION, SOLUTION SUBCUTANEOUS at 08:49

## 2024-02-02 RX ADMIN — MIDODRINE HYDROCHLORIDE 2.5 MG: 5 TABLET ORAL at 12:25

## 2024-02-02 RX ADMIN — TICAGRELOR 90 MG: 90 TABLET ORAL at 20:05

## 2024-02-02 RX ADMIN — ESCITALOPRAM 20 MG: 10 TABLET, FILM COATED ORAL at 08:12

## 2024-02-02 RX ADMIN — OXYCODONE HYDROCHLORIDE AND ACETAMINOPHEN 1 TABLET: 5; 325 TABLET ORAL at 10:08

## 2024-02-02 RX ADMIN — ALBUTEROL SULFATE 2.5 MG: 2.5 SOLUTION RESPIRATORY (INHALATION) at 08:36

## 2024-02-02 RX ADMIN — CLOTRIMAZOLE AND BETAMETHASONE DIPROPIONATE: 10; .5 CREAM TOPICAL at 20:04

## 2024-02-02 RX ADMIN — INSULIN LISPRO 4 UNITS: 100 INJECTION, SOLUTION INTRAVENOUS; SUBCUTANEOUS at 23:36

## 2024-02-02 RX ADMIN — PANTOPRAZOLE SODIUM 40 MG: 40 TABLET, DELAYED RELEASE ORAL at 08:11

## 2024-02-02 RX ADMIN — ALBUTEROL SULFATE 2.5 MG: 2.5 SOLUTION RESPIRATORY (INHALATION) at 20:47

## 2024-02-02 RX ADMIN — TOPIRAMATE 100 MG: 100 TABLET, FILM COATED ORAL at 20:05

## 2024-02-02 RX ADMIN — METOPROLOL TARTRATE 12.5 MG: 50 TABLET, FILM COATED ORAL at 08:11

## 2024-02-02 RX ADMIN — INSULIN LISPRO 3 UNITS: 100 INJECTION, SOLUTION INTRAVENOUS; SUBCUTANEOUS at 16:46

## 2024-02-02 RX ADMIN — ASPIRIN 81 MG 81 MG: 81 TABLET ORAL at 08:11

## 2024-02-02 RX ADMIN — BUDESONIDE AND FORMOTEROL FUMARATE DIHYDRATE 2 PUFF: 160; 4.5 AEROSOL RESPIRATORY (INHALATION) at 20:47

## 2024-02-02 RX ADMIN — OXYBUTYNIN CHLORIDE 5 MG: 5 TABLET, EXTENDED RELEASE ORAL at 08:12

## 2024-02-02 RX ADMIN — PANTOPRAZOLE SODIUM 40 MG: 40 TABLET, DELAYED RELEASE ORAL at 20:05

## 2024-02-02 RX ADMIN — INSULIN LISPRO 4 UNITS: 100 INJECTION, SOLUTION INTRAVENOUS; SUBCUTANEOUS at 12:25

## 2024-02-02 RX ADMIN — BENZONATATE 200 MG: 100 CAPSULE ORAL at 20:11

## 2024-02-02 RX ADMIN — BUDESONIDE AND FORMOTEROL FUMARATE DIHYDRATE 2 PUFF: 160; 4.5 AEROSOL RESPIRATORY (INHALATION) at 08:30

## 2024-02-02 RX ADMIN — INSULIN LISPRO 4 UNITS: 100 INJECTION, SOLUTION INTRAVENOUS; SUBCUTANEOUS at 20:18

## 2024-02-02 RX ADMIN — MAGNESIUM SULFATE HEPTAHYDRATE 2000 MG: 40 INJECTION, SOLUTION INTRAVENOUS at 14:26

## 2024-02-02 RX ADMIN — Medication 10 MG: at 20:05

## 2024-02-02 RX ADMIN — METOPROLOL TARTRATE 12.5 MG: 50 TABLET, FILM COATED ORAL at 20:05

## 2024-02-02 ASSESSMENT — PAIN DESCRIPTION - ORIENTATION
ORIENTATION: MID
ORIENTATION: RIGHT;LEFT
ORIENTATION: MID

## 2024-02-02 ASSESSMENT — PAIN SCALES - GENERAL
PAINLEVEL_OUTOF10: 7
PAINLEVEL_OUTOF10: 2
PAINLEVEL_OUTOF10: 0
PAINLEVEL_OUTOF10: 0
PAINLEVEL_OUTOF10: 2
PAINLEVEL_OUTOF10: 0
PAINLEVEL_OUTOF10: 9
PAINLEVEL_OUTOF10: 8
PAINLEVEL_OUTOF10: 6

## 2024-02-02 ASSESSMENT — PAIN SCALES - WONG BAKER
WONGBAKER_NUMERICALRESPONSE: 0

## 2024-02-02 ASSESSMENT — PAIN DESCRIPTION - LOCATION
LOCATION: CHEST
LOCATION: BACK
LOCATION: BACK

## 2024-02-02 ASSESSMENT — PAIN DESCRIPTION - DESCRIPTORS
DESCRIPTORS: ACHING
DESCRIPTORS: ACHING
DESCRIPTORS: DISCOMFORT

## 2024-02-02 NOTE — PROGRESS NOTES
[facility]  Office: [phone]  Goldy Moore DO, Ferdinand Villeda DO, Jose Goff DO, Alejo Mao DO, Mainor Smith MD, Melony Rodriguez MD, Tonia Brenner MD, Alethea Ramsey MD,  Rahul Zheng MD, Josi Saldana MD, Pedro Deras MD,  Lauren Acosta DO, Randee Pantoja MD, Raji Reddy MD, Praveen Moore DO, Veronica Duff MD,  Spencer Ferguson DO, Olivia Boss MD, Veronika Lin MD, Caryl Zacarias MD, Slick Jean MD,  Tacos Yang MD, Guzman Stein MD, Vicente Yee MD, Mickey Avila MD, Conrad Bailey MD, Raisa Felder MD, Travon Blanchard DO, Joel Rome DO, Mariel Francisco MD,  Mika Valentin MD, Shirley Waterhouse, CNP,  Isamar Seals CNP, Jose Warren, CNP,  Jenna Angulo, DNP, Jeannette Bobo, CNP, Sofya Rose, CNP, Zoe Coates CNP, Lara Yan, CNP, Michelle García, CNP, Tamara Issa, PA-C, Halina Lynch PA-C, Keri Weaver, CNP, Cheyanne White, CNS, Heide Collier, CNP, Amber Mast, CNP, Tracy Schwab, CNP         [facility]   IN-PATIENT SERVICE   [facility]    CONSULTATION / HISTORY AND PHYSICAL EXAMINATION            Date:   2/2/2024  Patient name:  Mariangel Abreu  Date of admission:  2/1/2024 12:34 PM  MRN:   [id number]  Account:  [id number]  YOB: 1950  PCP:    Carla Chua APRN - CNP  Room:   [address] [place] [address]  Code Status:    Full Code    Physician Requesting Consult: Victoriano Dee MD    Reason for Consult:  med mgt, dm2    Now off insulin infusion, transition to subc    BP soft, imdur on hold until cardio assesses, concern for preload reduction in setting of inferior MI  On DAPT  Remains on 3L NC  Mild uptrend in Cr,     Patient reports feeling tightness with inspiration. Reports cough slighly worse then baseline with mild productive sputum. Denies sob at rest but says she feels weak still. She denies anymore chest pain that she experienced when she came in. Denies GERD or heart burn. Denies  blood in stool, abdominal pain. Denies nausea, vomit, diaphoresis. Reports just weak still. She has a wilson in and wants to keep it for now until stronger. Denies flank pain, or UTI symptoms. Denies hx of gi bleed.    Discussed in detail importance of DAPT and side effects to monitor for.    She reports she didn't get her CPAP last night but wants it tonight.    She's up in chair, speaking in full setence. She's on her home 02 for her copd. Hasn't smoked in 3 years. Lives alone. Reports fully independent prior to this event.    Imdur appears to be new. She reports no issues with low BP prior to this.      -Off I      Past Medical History:     Past Medical History:   Diagnosis Date    Abdominal bloating 01/26/2021    Adenomatous polyp of ascending colon 01/26/2021    Allergic rhinitis     Anxiety 07/17/2013    Arthritis     Asthma     Atrial fibrillation (HCC)     Back pain     NERVE/DR. GRACIA    Benign hypertension with CKD (chronic kidney disease) stage III (HCC)     CAD (coronary artery disease) 03/21/2013    Caffeine use     2 coffee/day    CHF (congestive heart failure) (HCC)     Chronic kidney disease     CKD (chronic kidney disease) stage 3, GFR 30-59 ml/min (HCC)     COPD (chronic obstructive pulmonary disease) (HCC)     emphysema    Degeneration of lumbar or lumbosacral intervertebral disc     Depression     DM (diabetes mellitus) (HCC)     Emphysema of lung (HCC)     Gastritis     GERD (gastroesophageal reflux disease)     Hearing loss     Hematuria     Hiatal hernia     HTN (hypertension), benign 06/28/2014    Hypertriglyceridemia     Incontinence of urine 09/25/2013    Irritable bowel syndrome with both constipation and diarrhea 01/26/2021    Kidney stone     Knee arthropathy     Lumbosacral spondylosis without myelopathy 09/29/2014    Migraine headache     DR. GRACIA    Mumps     Neuropathy     Obesity     On home oxygen therapy     2 L PER NC  HS AND PRN    HIGINIO on CPAP     Otitis media 01/26/2015     Secondary diabetes mellitus with stage 3 chronic kidney disease (GFR 30-59) (HCC)     Stroke (HCC)      mini stroke.    Tinnitus     Type 2 diabetes mellitus without complication (HCC)     Type II or unspecified type diabetes mellitus without mention of complication, not stated as uncontrolled     UTI (lower urinary tract infection)     Varicose vein         Past Surgical History:     Past Surgical History:   Procedure Laterality Date    ABLATION OF DYSRHYTHMIC FOCUS  2000    CARPAL TUNNEL RELEASE Right     CATARACT REMOVAL WITH IMPLANT Bilateral     CHOLECYSTECTOMY  2007    COLONOSCOPY      COLONOSCOPY  04/10/2017    tubular adenomo polyp , mod. sigmoid diverticulosis, min. int. hemorrhoids    COLONOSCOPY N/A 3/2/2021    COLONOSCOPY WITH BIOPSY performed by Moris Brenner MD at [facility] ENDO    CYSTOSCOPY  9/25/2013    DILATION AND CURETTAGE  1979    EYE SURGERY      laser    INCONTINENCE SURGERY      Percutaneous nerve stimulation Dr Augie Amaya 2013     INSERTABLE CARDIAC MONITOR  12/04/2015    [employer] REVEAL LINQ MODEL #[id number] MRI CONDTIONAL OK 3T. IMMEDIATELY POST IMPLANT    OTHER SURGICAL HISTORY  09/10/15    removal of interstim    WI COLSC FLX W/REMOVAL LESION BY HOT BX FORCEPS N/A 4/10/2017    COLONOSCOPY POLYPECTOMY HOT BIOPSY performed by Ksenia Marroquin MD at [facility] OR    TONSILLECTOMY      TUBAL LIGATION      TYMPANOPLASTY      TYMPANOPLASTY      TYMPANOSTOMY TUBE PLACEMENT  08/21/2017    UPPER GASTROINTESTINAL ENDOSCOPY  03/2016    Dr Freeman    UPPER GASTROINTESTINAL ENDOSCOPY N/A 3/2/2021    EGD BIOPSY performed by Moris Brenner MD at [facility] ENDO        Medications Prior to Admission:     Prior to Admission medications    Medication Sig Start Date End Date Taking? Authorizing Provider   fluticasone furoate-vilanterol (BREO ELLIPTA) 100-25 MCG/ACT inhaler Inhale 1 puff into the lungs daily 1/26/24   Carla Chua, APRN - CNP   metFORMIN (GLUCOPHAGE) 1000 MG tablet Take 1 tablet by mouth 2 times daily (with

## 2024-02-02 NOTE — PLAN OF CARE
Problem: Chronic Conditions and Co-morbidities  Goal: Patient's chronic conditions and co-morbidity symptoms are monitored and maintained or improved  Outcome: Progressing     Problem: Pain  Goal: Verbalizes/displays adequate comfort level or baseline comfort level  Outcome: Progressing     Problem: Discharge Planning  Goal: Discharge to home or other facility with appropriate resources  Outcome: Progressing     Problem: Skin/Tissue Integrity  Goal: Absence of new skin breakdown  Description: 1.  Monitor for areas of redness and/or skin breakdown  2.  Assess vascular access sites hourly  3.  Every 4-6 hours minimum:  Change oxygen saturation probe site  4.  Every 4-6 hours:  If on nasal continuous positive airway pressure, respiratory therapy assess nares and determine need for appliance change or resting period.  Outcome: Progressing     Problem: Safety - Adult  Goal: Free from fall injury  Outcome: Progressing  Flowsheets (Taken 2/1/2024 1400 by Gaston Spann RN)  Free From Fall Injury: Instruct family/caregiver on patient safety     Problem: ABCDS Injury Assessment  Goal: Absence of physical injury  Outcome: Progressing

## 2024-02-02 NOTE — PROGRESS NOTES
Physical Therapy  Facility/Department: [facility] CAR 1- SICU  Physical Therapy Initial Assessment    Name: Mariangel Abreu  : 1950  MRN: [id number]  Date of Service: 2024    Discharge Recommendations:  Patient would benefit from continued therapy after discharge   PT Equipment Recommendations  Equipment Needed: No      Patient Diagnosis(es): The primary encounter diagnosis was ST elevation myocardial infarction (STEMI), unspecified artery ([facility]). Diagnoses of ST elevation myocardial infarction involving right coronary artery ([facility]) and S/P primary angioplasty with coronary stent were also pertinent to this visit.  Past Medical History:  has a past medical history of Abdominal bloating, Adenomatous polyp of ascending colon, Allergic rhinitis, Anxiety, Arthritis, Asthma, Atrial fibrillation ([facility]), Back pain, Benign hypertension with CKD (chronic kidney disease) stage III ([facility]), CAD (coronary artery disease), Caffeine use, CHF (congestive heart failure) ([facility]), Chronic kidney disease, CKD (chronic kidney disease) stage 3, GFR 30-59 ml/min ([facility]), COPD (chronic obstructive pulmonary disease) ([facility]), Degeneration of lumbar or lumbosacral intervertebral disc, Depression, DM (diabetes mellitus) ([facility]), Emphysema of lung ([facility]), Gastritis, GERD (gastroesophageal reflux disease), Hearing loss, Hematuria, Hiatal hernia, HTN (hypertension), benign, Hypertriglyceridemia, Incontinence of urine, Irritable bowel syndrome with both constipation and diarrhea, Kidney stone, Knee arthropathy, Lumbosacral spondylosis without myelopathy, Migraine headache, Mumps, Neuropathy, Obesity, On home oxygen therapy, HIGINIO on CPAP, Otitis media, Secondary diabetes mellitus with stage 3 chronic kidney disease (GFR 30-59) ([facility]), Stroke ([facility]), Tinnitus, Type 2 diabetes mellitus without complication ([facility]), Type II or unspecified type diabetes mellitus without mention of complication, not stated as uncontrolled, UTI (lower urinary tract infection),  training, Safety education & training, Patient/Caregiver education & training, Home exercise program, Therapeutic activities, Equipment evaluation, education, & procurement, Endurance training  Safety Devices  Type of Devices: Call light within reach, Gait belt, Nurse notified, Left in chair  Restraints  Restraints Initially in Place: No     Restrictions  Restrictions/Precautions  Required Braces or Orthoses?: No  Position Activity Restriction  Other position/activity restrictions: s/p PCI with ELEANOR to mid RCA  02/01/2024     Subjective   General  Patient assessed for rehabilitation services?: Yes  Response To Previous Treatment: Not applicable  Family / Caregiver Present: No  Follows Commands: Within Functional Limits  Subjective  Subjective: Pt up in a chair and agreeable to therapy, RN agreeable to therapy.  Pt pleasant and cooperative throughout.  Pt reports 7/10 back pain at rest, repositioned for comfort.         Social/Functional History  Social/Functional History  Lives With: Alone  Type of Home: Apartment (8th floor with elevator)  Home Layout: One level  Home Access: Level entry, Elevator  Bathroom Shower/Tub: Tub/Shower unit  Bathroom Toilet: Standard  Bathroom Equipment: Grab bars in shower, Shower chair  Home Equipment: Oxygen, Rollator, Electric scooter (2-3 lpm, pt reports using rollator for functional mobility within apartment and electric scooter for community distances)  ADL Assistance: Independent  Homemaking Assistance: Independent  Homemaking Responsibilities: Yes  Ambulation Assistance: Independent  Transfer Assistance: Independent  Active : No  Patient's  Info: Med cab, family  Occupation: Retired  Type of Occupation: housekeeping  Leisure & Hobbies: pt reports playing cards on Thursdays ([employer] and [employer])  Vision/Hearing  Vision  Vision: Impaired  Vision Exceptions: Wears glasses at all times  Hearing  Hearing: Exceptions to WFL  Hearing Exceptions: Hard of hearing/hearing  concerns (pt reports typically wearing bilateral hearing aids however lost one hearing aid; no hearing aid present upon arrival)    Cognition   Cognition  Overall Cognitive Status: WFL     Objective                 AROM RLE (degrees)  RLE AROM: WFL  AROM LLE (degrees)  LLE AROM : WFL  AROM RUE (degrees)  RUE AROM : WFL  AROM LUE (degrees)  LUE AROM : WFL  Strength RLE  Strength RLE: WFL  Comment: Grossly 4-/5  Strength LLE  Strength LLE: Exception  L Hip Flexion: 3/5  L Knee Extension: 3+/5  L Ankle Dorsiflexion: 3/5  L Ankle Plantar Flexion: 3/5  Strength RUE  Comment: Co-eval with OT, see OT note for UE detail.  Strength LUE  Comment: Co-eval with OT, see OT note for UE detail.      Bed mobility  Bed Mobility Comments: pt began today's session up in a chair, retired up to a chair.  Transfers  Sit to Stand: Contact guard assistance  Stand to Sit: Contact guard assistance  Comment: Transfers performed with a RW.  Upon standing pt complains of significant dizziness, unable to tolerate standing long enough to obtain a BP        Balance  Posture: Fair  Sitting - Static: Good  Sitting - Dynamic: Good;-  Standing - Static: Fair  Comments: sitting balance assessed at the EOC, standing balance assessed while using a RW.  Static and dynamic sitting balance challenged in unsupported sitting x~15 minutes.                                                      AM-PAC - Mobility    AM-PAC Basic Mobility - Inpatient   How much help is needed turning from your back to your side while in a flat bed without using bedrails?: None  How much help is needed moving from lying on your back to sitting on the side of a flat bed without using bedrails?: A Little  How much help is needed moving to and from a bed to a chair?: A Little  How much help is needed standing up from a chair using your arms?: A Little  How much help is needed walking in hospital room?: Total  How much help is needed climbing 3-5 steps with a railing?: Total  AM-PAC

## 2024-02-02 NOTE — CARE COORDINATION
Incoming call from Marie RN inpatient coordinator. She asked about home care being set up for patient. I informed her that I am following her and orders for HH have not been placed but will be doing the referral once she gets home. Marie states expected DC late today or tomorrow. Will F/U after discharge

## 2024-02-02 NOTE — CARE COORDINATION
02/02/24 1028   Readmission Assessment   Number of Days since last admission? 8-30 days   Previous Disposition Home Alone   Who is being Interviewed Patient   What was the patient's/caregiver's perception as to why they think they needed to return back to the hospital? Other (Comment)  (None)   Did you visit your Primary Care Physician after you left the hospital, before you returned this time? Yes   Did you see a specialist, such as Cardiac, Pulmonary, Orthopedic Physician, etc. after you left the hospital? No   Who advised the patient to return to the hospital? Self-referral   Does the patient report anything that got in the way of taking their medications? No   In our efforts to provide the best possible care to you and others like you, can you think of anything that we could have done to help you after you left the hospital the first time, so that you might not have needed to return so soon? Other (Comment)  (None)

## 2024-02-02 NOTE — PROGRESS NOTES
[facility]   Progress Note                   Date:   2/2/2024  Patient name: Mariangel Abreu  Date of admission:  2/1/2024 12:34 PM  MRN:   [id number]  YOB: 1950  PCP: Carla Chua, VERONIKA - CNP    Reason for Admission: Inferior STEMI.    Subjective:   Patient was seen and examined.  Vitally stable.  Troponin uptrending.  Internal medicine on board for medical management.  2D echo pending.    Medications:   Scheduled Meds:   heparin (porcine)  4,000 Units IntraVENous Once    sodium chloride flush  10 mL IntraVENous 2 times per day    aspirin  81 mg Oral Daily    ticagrelor  90 mg Oral BID    escitalopram  20 mg Oral QAM    budesonide-formoterol  2 puff Inhalation BID RT    atorvastatin  40 mg Oral Daily    clotrimazole-betamethasone   Topical BID    docusate sodium  100 mg Oral BID    ferrous sulfate  325 mg Oral Daily with breakfast    isosorbide dinitrate  30 mg Oral Daily    metoprolol tartrate  12.5 mg Oral BID    oxyBUTYnin  5 mg Oral Daily    pantoprazole  40 mg Oral BID    topiramate  100 mg Oral Nightly    insulin lispro  0-4 Units SubCUTAneous TID WC    insulin lispro  0-4 Units SubCUTAneous Nightly    insulin glargine  35 Units SubCUTAneous BID     Continuous Infusions:   sodium chloride      dextrose      dextrose      insulin Stopped (02/02/24 0352)     CBC:   Recent Labs     02/01/24  1244 02/02/24  0348   WBC 9.6 12.8*   HGB 15.2* 14.2    209     BMP:    Recent Labs     02/01/24  1244 02/01/24  1255 02/02/24  0348   *  --  133*   K 4.4  --  4.4   CL 94*  --  100   CO2 23  --  21   BUN 19  --  26*   CREATININE 0.8 0.8 1.0*   GLUCOSE 434*  --  183*     Hepatic: No results for input(s): \"AST\", \"ALT\", \"ALB\", \"BILITOT\", \"ALKPHOS\" in the last 72 hours.  Troponin: No results for input(s): \"TROPONINI\" in the last 72 hours.  BNP: No results for input(s): \"BNP\" in the last 72 hours.  Lipids: No results for input(s): \"CHOL\", \"HDL\" in the last 72

## 2024-02-02 NOTE — PROGRESS NOTES
Occupational Therapy  Facility/Department: [facility] CAR 1- SICU  Occupational Therapy Initial Assessment    Name: Mariangel Abreu  : 1950  MRN: [id number]  Date of Service: 2024    s/p PCI with ELEANOR to mid RCA  2024    Discharge Recommendations:  Patient would benefit from continued therapy after discharge          Patient Diagnosis(es): The primary encounter diagnosis was ST elevation myocardial infarction (STEMI), unspecified artery ([facility]). Diagnoses of ST elevation myocardial infarction involving right coronary artery (HCC) and S/P primary angioplasty with coronary stent were also pertinent to this visit.  Past Medical History:  has a past medical history of Abdominal bloating, Adenomatous polyp of ascending colon, Allergic rhinitis, Anxiety, Arthritis, Asthma, Atrial fibrillation ([facility]), Back pain, Benign hypertension with CKD (chronic kidney disease) stage III ([facility]), CAD (coronary artery disease), Caffeine use, CHF (congestive heart failure) ([facility]), Chronic kidney disease, CKD (chronic kidney disease) stage 3, GFR 30-59 ml/min ([facility]), COPD (chronic obstructive pulmonary disease) ([facility]), Degeneration of lumbar or lumbosacral intervertebral disc, Depression, DM (diabetes mellitus) ([facility]), Emphysema of lung ([facility]), Gastritis, GERD (gastroesophageal reflux disease), Hearing loss, Hematuria, Hiatal hernia, HTN (hypertension), benign, Hypertriglyceridemia, Incontinence of urine, Irritable bowel syndrome with both constipation and diarrhea, Kidney stone, Knee arthropathy, Lumbosacral spondylosis without myelopathy, Migraine headache, Mumps, Neuropathy, Obesity, On home oxygen therapy, HIGINIO on CPAP, Otitis media, Secondary diabetes mellitus with stage 3 chronic kidney disease (GFR 30-59) ([facility]), Stroke ([facility]), Tinnitus, Type 2 diabetes mellitus without complication ([facility]), Type II or unspecified type diabetes mellitus without mention of complication, not stated as uncontrolled, UTI (lower urinary tract infection),  x/wk  Current Treatment Recommendations: Balance training, Functional mobility training, Pain management, Safety education & training, Patient/Caregiver education & training, Equipment evaluation, education, & procurement, Self-Care / ADL, Home management training, Endurance training     Restrictions  Restrictions/Precautions  Required Braces or Orthoses?: No  Position Activity Restriction  Other position/activity restrictions: s/p PCI with ELEANOR to mid RCA  02/01/2024    Subjective   General  Patient assessed for rehabilitation services?: Yes  Family / Caregiver Present: No  General Comment  Comments: RN ok'd pt for OT this date. Pt agreed to session and was pleasant/cooperative throughout. Pt reports pain of 7/10 to chest and back and was assisted with increased activity and repositioning for optimal comfort. RN also provided pain medication at beginning of session. Pt on 2L O2 via NC throughout session.    Social/Functional History  Social/Functional History  Lives With: Alone  Type of Home: Apartment (8th floor with elevator)  Home Layout: One level  Home Access: Level entry, Elevator  Bathroom Shower/Tub: Tub/Shower unit  Bathroom Toilet: Standard  Bathroom Equipment: Grab bars in shower, Shower chair  Home Equipment: Oxygen, Rollator, Electric scooter (2-3 lpm, pt reports using rollator for functional mobility within apartment and electric scooter for community distances)  ADL Assistance: Independent  Homemaking Assistance: Independent  Homemaking Responsibilities: Yes  Ambulation Assistance: Independent  Transfer Assistance: Independent  Active : No  Patient's  Info: Med cab, family  Occupation: Retired  Type of Occupation: housekeeping  Leisure & Hobbies: pt reports playing cards on Thursdays ([employer] and [employer])       Objective   O2 Device: Nasal cannula (2L O2) (during session SpO2 measuring 88-92%. RN aware)    Safety Devices  Type of Devices: Call light within reach;Gait belt;Nurse  hearing/hearing concerns (pt reports typically wearing bilateral hearing aids however lost one hearing aid; no hearing aid present upon arrival)    Cognition  Overall Cognitive Status: WFL    Education Provided Comments: Pt ed on OT role, OT POC, safety awareness, transfer training, DME use, pursed lip breathing, adaptive ADL tech, fall prevention, and importance of continued OT. Good return    Hand Dominance  Hand Dominance: Right            AM-PAC - ADL  AM-PAC Daily Activity - Inpatient   How much help is needed for putting on and taking off regular lower body clothing?: A Little  How much help is needed for bathing (which includes washing, rinsing, drying)?: A Little  How much help is needed for toileting (which includes using toilet, bedpan, or urinal)?: A Little  How much help is needed for putting on and taking off regular upper body clothing?: A Little  How much help is needed for taking care of personal grooming?: None  How much help for eating meals?: None  AM-PAC Inpatient Daily Activity Raw Score: 20  AM-PAC Inpatient ADL T-Scale Score : 42.03  ADL Inpatient CMS 0-100% Score: 38.32  ADL Inpatient CMS G-Code Modifier : CJ    Goals  Short Term Goals  Time Frame for Short Term Goals: By discharge, pt will:  Short Term Goal 1: Demo Mod I for functional transfers and functional mobility with use of LRAD for engagement in ADLs/IADLs  Short Term Goal 2: Demo IND for UB ADLs  Short Term Goal 3: Demo Mod I for LB ADLs and toileting tasks with setup and AE use PRN  Short Term Goal 4: Engage in 5 min dynamic standing activity with SUP and unilateral UE release from support for improved balance during ADL/IADL participation  Short Term Goal 5: Demo good safety awareness throughout therapy session with 0 VCs       Therapy Time   Individual Concurrent Group Co-treatment   Time In 1001         Time Out 1025         Minutes 24         Timed Code Treatment Minutes: 9 Minutes       Johnnie Wong OTR/L

## 2024-02-02 NOTE — CARE COORDINATION
Case Management Assessment  Initial Evaluation    Date/Time of Evaluation: 2/2/2024 10:36 AM  Assessment Completed by: CHRIS TRAN RN    If patient is discharged prior to next notation, then this note serves as note for discharge by case management.    Patient Name: Mariangel Abreu                   YOB: 1950  Diagnosis: ST elevation myocardial infarction (STEMI), unspecified artery (HCC) [I21.3]  STEMI (ST elevation myocardial infarction) (HCC) [I21.3]                   Date / Time: 2/1/2024 12:34 PM    Patient Admission Status: Inpatient   Readmission Risk (Low < 19, Mod (19-27), High > 27): Readmission Risk Score: 20.4    Current PCP: Carla Chua, VERONIKA - CNP  PCP verified by CM? Yes    Chart Reviewed: Yes      History Provided by: Patient  Patient Orientation: Alert and Oriented    Patient Cognition:      Hospitalization in the last 30 days (Readmission):  Yes    If yes, Readmission Assessment in  Navigator will be completed.    Advance Directives:      Code Status: Full Code   Patient's Primary Decision Maker is: Legal Next of Kin    Primary Decision Maker (Active): David Waller - Other Relative - [phone]    Discharge Planning:    Patient lives with: (P) Alone Type of Home: (P) Apartment  Primary Care Giver: Self  Patient Support Systems include: Friends/Neighbors   Current Financial resources: Medicaid, Medicare  Current community resources:    Current services prior to admission: (P) Durable Medical Equipment, C-pap, Other (Comment), Oxygen Therapy, Transportation (O2 from [employer] [facility] LPM prn. Does not have her O2 tank with her.  outpatient services through [facility].)            Current DME: (P) Home Aerosol, Oxygen Therapy (Comment), Shower Chair, Walker, Other (Comment) (Scooter)            Type of Home Care services:  (P) None    ADLS  Prior functional level: Assistance with the following:, Housework  Current functional level: Assistance with the following:, Housework    PT  supports the patient's individualized plan of care/goals and shares the quality data associated with the providers was provided to:     Patient Representative Name:       The Patient and/or Patient Representative Agree with the Discharge Plan?      CHRIS TRAN RN  Case Management Department  Ph: [phone]

## 2024-02-03 ENCOUNTER — APPOINTMENT (OUTPATIENT)
Age: 74
DRG: 322 | End: 2024-02-03
Payer: MEDICARE

## 2024-02-03 LAB
ALBUMIN SERPL-MCNC: 3.4 G/DL (ref 3.5–5.2)
ALBUMIN/GLOB SERPL: 1.1 {RATIO} (ref 1–2.5)
ALP SERPL-CCNC: 61 U/L (ref 35–104)
ALT SERPL-CCNC: 23 U/L (ref 5–33)
ANION GAP SERPL CALCULATED.3IONS-SCNC: 11 MMOL/L (ref 9–17)
ANION GAP SERPL CALCULATED.3IONS-SCNC: 9 MMOL/L (ref 9–17)
AST SERPL-CCNC: 46 U/L
BASOPHILS # BLD: 0.04 K/UL (ref 0–0.2)
BASOPHILS NFR BLD: 1 % (ref 0–2)
BILIRUB SERPL-MCNC: 0.2 MG/DL (ref 0.3–1.2)
BODY TEMPERATURE: 37
BUN SERPL-MCNC: 28 MG/DL (ref 8–23)
BUN SERPL-MCNC: 33 MG/DL (ref 8–23)
CALCIUM SERPL-MCNC: 8.5 MG/DL (ref 8.6–10.4)
CALCIUM SERPL-MCNC: 8.5 MG/DL (ref 8.6–10.4)
CHLORIDE SERPL-SCNC: 98 MMOL/L (ref 98–107)
CHLORIDE SERPL-SCNC: 99 MMOL/L (ref 98–107)
CO2 SERPL-SCNC: 24 MMOL/L (ref 20–31)
CO2 SERPL-SCNC: 24 MMOL/L (ref 20–31)
COHGB MFR BLD: 0.8 % (ref 0–5)
CREAT SERPL-MCNC: 1 MG/DL (ref 0.5–0.9)
CREAT SERPL-MCNC: 1 MG/DL (ref 0.5–0.9)
EOSINOPHIL # BLD: 0.06 K/UL (ref 0–0.44)
EOSINOPHILS RELATIVE PERCENT: 1 % (ref 1–4)
ERYTHROCYTE [DISTWIDTH] IN BLOOD BY AUTOMATED COUNT: 13 % (ref 11.8–14.4)
FIO2 ON VENT: ABNORMAL %
GFR SERPL CREATININE-BSD FRML MDRD: 59 ML/MIN/1.73M2
GFR SERPL CREATININE-BSD FRML MDRD: 59 ML/MIN/1.73M2
GLUCOSE BLD-MCNC: 165 MG/DL (ref 65–105)
GLUCOSE BLD-MCNC: 220 MG/DL (ref 65–105)
GLUCOSE BLD-MCNC: 239 MG/DL (ref 65–105)
GLUCOSE BLD-MCNC: 259 MG/DL (ref 65–105)
GLUCOSE SERPL-MCNC: 225 MG/DL (ref 70–99)
GLUCOSE SERPL-MCNC: 231 MG/DL (ref 70–99)
HCO3 VENOUS: 28.4 MMOL/L (ref 24–30)
HCT VFR BLD AUTO: 42 % (ref 36.3–47.1)
HGB BLD-MCNC: 13.2 G/DL (ref 11.9–15.1)
IMM GRANULOCYTES # BLD AUTO: 0.03 K/UL (ref 0–0.3)
IMM GRANULOCYTES NFR BLD: 0 %
LYMPHOCYTES NFR BLD: 0.93 K/UL (ref 1.1–3.7)
LYMPHOCYTES RELATIVE PERCENT: 13 % (ref 24–43)
MCH RBC QN AUTO: 30.2 PG (ref 25.2–33.5)
MCHC RBC AUTO-ENTMCNC: 31.4 G/DL (ref 28.4–34.8)
MCV RBC AUTO: 96.1 FL (ref 82.6–102.9)
MONOCYTES NFR BLD: 0.89 K/UL (ref 0.1–1.2)
MONOCYTES NFR BLD: 12 % (ref 3–12)
NEUTROPHILS NFR BLD: 74 % (ref 36–65)
NEUTS SEG NFR BLD: 5.47 K/UL (ref 1.5–8.1)
NRBC BLD-RTO: 0 PER 100 WBC
O2 SAT, VEN: 67.8 % (ref 60–85)
PCO2, VEN: 62.9 MM HG (ref 39–55)
PH VENOUS: 7.28 (ref 7.32–7.42)
PLATELET # BLD AUTO: ABNORMAL K/UL (ref 138–453)
PLATELET, FLUORESCENCE: 152 K/UL (ref 138–453)
PLATELETS.RETICULATED NFR BLD AUTO: 10 % (ref 1.1–10.3)
PO2, VEN: 38.3 MM HG (ref 30–50)
POSITIVE BASE EXCESS, VEN: 0.5 MMOL/L (ref 0–2)
POTASSIUM SERPL-SCNC: 4 MMOL/L (ref 3.7–5.3)
POTASSIUM SERPL-SCNC: 4.1 MMOL/L (ref 3.7–5.3)
PROT SERPL-MCNC: 6.4 G/DL (ref 6.4–8.3)
RBC # BLD AUTO: 4.37 M/UL (ref 3.95–5.11)
SODIUM SERPL-SCNC: 131 MMOL/L (ref 135–144)
SODIUM SERPL-SCNC: 134 MMOL/L (ref 135–144)
TROPONIN I SERPL HS-MCNC: 1763 NG/L (ref 0–14)
TROPONIN I SERPL HS-MCNC: 1855 NG/L (ref 0–14)
WBC OTHER # BLD: 7.4 K/UL (ref 3.5–11.3)

## 2024-02-03 PROCEDURE — 6370000000 HC RX 637 (ALT 250 FOR IP): Performed by: INTERNAL MEDICINE

## 2024-02-03 PROCEDURE — 94660 CPAP INITIATION&MGMT: CPT

## 2024-02-03 PROCEDURE — 2700000000 HC OXYGEN THERAPY PER DAY

## 2024-02-03 PROCEDURE — 80048 BASIC METABOLIC PNL TOTAL CA: CPT

## 2024-02-03 PROCEDURE — 82947 ASSAY GLUCOSE BLOOD QUANT: CPT

## 2024-02-03 PROCEDURE — 6370000000 HC RX 637 (ALT 250 FOR IP): Performed by: STUDENT IN AN ORGANIZED HEALTH CARE EDUCATION/TRAINING PROGRAM

## 2024-02-03 PROCEDURE — 94640 AIRWAY INHALATION TREATMENT: CPT

## 2024-02-03 PROCEDURE — 84484 ASSAY OF TROPONIN QUANT: CPT

## 2024-02-03 PROCEDURE — 85025 COMPLETE CBC W/AUTO DIFF WBC: CPT

## 2024-02-03 PROCEDURE — 6370000000 HC RX 637 (ALT 250 FOR IP): Performed by: NURSE PRACTITIONER

## 2024-02-03 PROCEDURE — 93306 TTE W/DOPPLER COMPLETE: CPT

## 2024-02-03 PROCEDURE — 80053 COMPREHEN METABOLIC PANEL: CPT

## 2024-02-03 PROCEDURE — 36415 COLL VENOUS BLD VENIPUNCTURE: CPT

## 2024-02-03 PROCEDURE — 2580000003 HC RX 258: Performed by: STUDENT IN AN ORGANIZED HEALTH CARE EDUCATION/TRAINING PROGRAM

## 2024-02-03 PROCEDURE — 85055 RETICULATED PLATELET ASSAY: CPT

## 2024-02-03 PROCEDURE — 2060000000 HC ICU INTERMEDIATE R&B

## 2024-02-03 PROCEDURE — 82805 BLOOD GASES W/O2 SATURATION: CPT

## 2024-02-03 PROCEDURE — 94761 N-INVAS EAR/PLS OXIMETRY MLT: CPT

## 2024-02-03 PROCEDURE — 99232 SBSQ HOSP IP/OBS MODERATE 35: CPT | Performed by: INTERNAL MEDICINE

## 2024-02-03 PROCEDURE — 93306 TTE W/DOPPLER COMPLETE: CPT | Performed by: INTERNAL MEDICINE

## 2024-02-03 PROCEDURE — 6370000000 HC RX 637 (ALT 250 FOR IP)

## 2024-02-03 RX ADMIN — BENZONATATE 200 MG: 100 CAPSULE ORAL at 16:48

## 2024-02-03 RX ADMIN — DOCUSATE SODIUM 100 MG: 100 CAPSULE, LIQUID FILLED ORAL at 20:55

## 2024-02-03 RX ADMIN — BISMUTH SUBSALICYLATE 524 MG: 262 TABLET, CHEWABLE ORAL at 16:48

## 2024-02-03 RX ADMIN — INSULIN GLARGINE 35 UNITS: 100 INJECTION, SOLUTION SUBCUTANEOUS at 09:06

## 2024-02-03 RX ADMIN — MIDODRINE HYDROCHLORIDE 2.5 MG: 5 TABLET ORAL at 12:03

## 2024-02-03 RX ADMIN — ASPIRIN 81 MG 81 MG: 81 TABLET ORAL at 08:59

## 2024-02-03 RX ADMIN — CLOTRIMAZOLE AND BETAMETHASONE DIPROPIONATE: 10; .5 CREAM TOPICAL at 20:55

## 2024-02-03 RX ADMIN — TICAGRELOR 90 MG: 90 TABLET ORAL at 20:55

## 2024-02-03 RX ADMIN — CLOTRIMAZOLE AND BETAMETHASONE DIPROPIONATE: 10; .5 CREAM TOPICAL at 09:03

## 2024-02-03 RX ADMIN — BENZONATATE 200 MG: 100 CAPSULE ORAL at 09:05

## 2024-02-03 RX ADMIN — ATORVASTATIN CALCIUM 40 MG: 40 TABLET, FILM COATED ORAL at 08:59

## 2024-02-03 RX ADMIN — TOPIRAMATE 100 MG: 100 TABLET, FILM COATED ORAL at 20:54

## 2024-02-03 RX ADMIN — INSULIN LISPRO 8 UNITS: 100 INJECTION, SOLUTION INTRAVENOUS; SUBCUTANEOUS at 16:49

## 2024-02-03 RX ADMIN — PANTOPRAZOLE SODIUM 40 MG: 40 TABLET, DELAYED RELEASE ORAL at 20:55

## 2024-02-03 RX ADMIN — DICYCLOMINE HYDROCHLORIDE 10 MG: 10 CAPSULE ORAL at 09:01

## 2024-02-03 RX ADMIN — INSULIN LISPRO 4 UNITS: 100 INJECTION, SOLUTION INTRAVENOUS; SUBCUTANEOUS at 12:04

## 2024-02-03 RX ADMIN — METOPROLOL TARTRATE 12.5 MG: 50 TABLET, FILM COATED ORAL at 09:01

## 2024-02-03 RX ADMIN — BISMUTH SUBSALICYLATE 524 MG: 262 TABLET, CHEWABLE ORAL at 20:55

## 2024-02-03 RX ADMIN — BISMUTH SUBSALICYLATE 524 MG: 262 TABLET, CHEWABLE ORAL at 00:26

## 2024-02-03 RX ADMIN — BUDESONIDE AND FORMOTEROL FUMARATE DIHYDRATE 2 PUFF: 160; 4.5 AEROSOL RESPIRATORY (INHALATION) at 19:40

## 2024-02-03 RX ADMIN — FERROUS SULFATE TAB EC 325 MG (65 MG FE EQUIVALENT) 325 MG: 325 (65 FE) TABLET DELAYED RESPONSE at 09:02

## 2024-02-03 RX ADMIN — DOCUSATE SODIUM 100 MG: 100 CAPSULE, LIQUID FILLED ORAL at 08:59

## 2024-02-03 RX ADMIN — BISMUTH SUBSALICYLATE 524 MG: 262 TABLET, CHEWABLE ORAL at 09:00

## 2024-02-03 RX ADMIN — BUDESONIDE AND FORMOTEROL FUMARATE DIHYDRATE 2 PUFF: 160; 4.5 AEROSOL RESPIRATORY (INHALATION) at 09:15

## 2024-02-03 RX ADMIN — SODIUM CHLORIDE, PRESERVATIVE FREE 10 ML: 5 INJECTION INTRAVENOUS at 09:03

## 2024-02-03 RX ADMIN — SODIUM CHLORIDE: 9 INJECTION, SOLUTION INTRAVENOUS at 06:01

## 2024-02-03 RX ADMIN — INSULIN GLARGINE 35 UNITS: 100 INJECTION, SOLUTION SUBCUTANEOUS at 20:55

## 2024-02-03 RX ADMIN — ESCITALOPRAM 20 MG: 10 TABLET, FILM COATED ORAL at 09:00

## 2024-02-03 RX ADMIN — PANTOPRAZOLE SODIUM 40 MG: 40 TABLET, DELAYED RELEASE ORAL at 08:59

## 2024-02-03 RX ADMIN — Medication 10 MG: at 21:06

## 2024-02-03 RX ADMIN — BENZONATATE 200 MG: 100 CAPSULE ORAL at 21:06

## 2024-02-03 RX ADMIN — METOPROLOL TARTRATE 12.5 MG: 50 TABLET, FILM COATED ORAL at 20:55

## 2024-02-03 RX ADMIN — MIDODRINE HYDROCHLORIDE 2.5 MG: 5 TABLET ORAL at 09:01

## 2024-02-03 RX ADMIN — SODIUM CHLORIDE, PRESERVATIVE FREE 10 ML: 5 INJECTION INTRAVENOUS at 20:55

## 2024-02-03 RX ADMIN — TICAGRELOR 90 MG: 90 TABLET ORAL at 09:00

## 2024-02-03 NOTE — PROGRESS NOTES
[facility]   Progress Note                   Date:   2/3/2024  Patient name: Mariangel Abreu  Date of admission:  2/1/2024 12:34 PM  MRN:   [id number]  YOB: 1950  PCP: Carla Chua, VERONIKA - CNP    Reason for Admission: Inferior STEMI.    Subjective:   Patient was seen and examined.  Vitally stable.  Troponin peaked and now downtrending.  Blood pressure is on the softer side.  Glucose continues to be uncontrolled.  Internal medicine on for management.    Medications:   Scheduled Meds:   midodrine  2.5 mg Oral TID WC    clotrimazole-betamethasone   Topical BID    bismuth subsalicylate  524 mg Oral 4x Daily AC & HS    insulin lispro  0-16 Units SubCUTAneous TID WC    insulin lispro  0-4 Units SubCUTAneous Nightly    sodium chloride flush  10 mL IntraVENous 2 times per day    aspirin  81 mg Oral Daily    ticagrelor  90 mg Oral BID    escitalopram  20 mg Oral QAM    budesonide-formoterol  2 puff Inhalation BID RT    atorvastatin  40 mg Oral Daily    docusate sodium  100 mg Oral BID    ferrous sulfate  325 mg Oral Daily with breakfast    [Held by provider] isosorbide dinitrate  30 mg Oral Daily    metoprolol tartrate  12.5 mg Oral BID    [Held by provider] oxyBUTYnin  5 mg Oral Daily    pantoprazole  40 mg Oral BID    topiramate  100 mg Oral Nightly    insulin glargine  35 Units SubCUTAneous BID     Continuous Infusions:   sodium chloride 50 mL/hr at 02/03/24 0601    dextrose      dextrose       CBC:   Recent Labs     02/01/24  1244 02/02/24  0348   WBC 9.6 12.8*   HGB 15.2* 14.2    209       BMP:    Recent Labs     02/01/24  1244 02/01/24  1255 02/02/24  0348 02/03/24  0452   *  --  133* PENDING   K 4.4  --  4.4 PENDING   CL 94*  --  100 PENDING   CO2 23  --  21 PENDING   BUN 19  --  26* PENDING   CREATININE 0.8 0.8 1.0* PENDING   GLUCOSE 434*  --  183* PENDING       Hepatic:   Recent Labs     02/02/24  1351   AST 59*   ALT 21   BILITOT 0.3   ALKPHOS 64     Troponin:

## 2024-02-03 NOTE — PLAN OF CARE
Problem: Chronic Conditions and Co-morbidities  Goal: Patient's chronic conditions and co-morbidity symptoms are monitored and maintained or improved  Outcome: Progressing     Problem: Pain  Goal: Verbalizes/displays adequate comfort level or baseline comfort level  Outcome: Progressing     Problem: Discharge Planning  Goal: Discharge to home or other facility with appropriate resources  Outcome: Progressing     Problem: Skin/Tissue Integrity  Goal: Absence of new skin breakdown  Description: 1.  Monitor for areas of redness and/or skin breakdown  2.  Assess vascular access sites hourly  3.  Every 4-6 hours minimum:  Change oxygen saturation probe site  4.  Every 4-6 hours:  If on nasal continuous positive airway pressure, respiratory therapy assess nares and determine need for appliance change or resting period.  Outcome: Progressing     Problem: Safety - Adult  Goal: Free from fall injury  Outcome: Progressing     Problem: ABCDS Injury Assessment  Goal: Absence of physical injury  Outcome: Progressing     Problem: Respiratory - Adult  Goal: Achieves optimal ventilation and oxygenation  2/3/2024 0513 by Victor Hugo Garcia RN  Outcome: Progressing  2/2/2024 2050 by Reema Cristobal RCP  Outcome: Progressing  2/2/2024 1904 by Kellee Stein RCP  Outcome: Progressing

## 2024-02-03 NOTE — PROGRESS NOTES
NON INVASIVE VENTILATION  PROVIDE OPTIMAL VENTILATION/ACCEPTABLE SP02  IMPLEMENT NON INVASIVE VENTILATION PROTOCOL  ASSESSMENT SKIN INTEGRITY  PATIENT EDUCATION AS NEEDED  BIPAP AS NEEDEDBRONCHOSPASM/BRONCHOCONSTRICTION     [x]         IMPROVE AERATION/BREATH SOUNDS  [x]   ADMINISTER BRONCHODILATOR THERAPY AS APPROPRIATE  [x]   ASSESS BREATH SOUNDS  []   IMPLEMENT AEROSOL/MDI PROTOCOL  [x]   PATIENT EDUCATION AS NEEDED

## 2024-02-03 NOTE — PROGRESS NOTES
Patients gela Samaniego was called and notified re: pt's transfer from [facility]. All questions were answered.

## 2024-02-03 NOTE — PROGRESS NOTES
During initial assessment pt. Was complaining of chest pain. Cardiology made aware, no new orders at this time.

## 2024-02-03 NOTE — PROGRESS NOTES
Physician Progress Note      PATIENT:               NO BARLOW  CSN #:                  [id number]  :                       1950  ADMIT DATE:       2024 12:34 PM  DISCH DATE:  RESPONDING  PROVIDER #:        TYRELL MORGAN MD          QUERY TEXT:    Pt admitted with STEMI.  Pt noted to have COPD, HIGINIO and home oxygen therapy.   If possible, please document in the progress notes and discharge summary if   you are evaluating and/or treating any of the following:    The medical record reflects the following:  Risk Factors: COPD, HIGINIO , morbid obesity, STEMI  Clinical Indicators: home O2 , BMI- 45. VBG- 7.323 / 53.5  / 50.5 / 27.8.     lungs noted as diminished. Spo2 @ 91-94% with treatment.  Treatment: Oxygen therapy - 2-3L/NC. Cardiac cath - with PCI.    Thank you, please contact me for any questions.  Praveen Montiel RN, CDS  cell- [phone]  office hours - 630A-300P  Options provided:  -- Chronic respiratory failure with hypoxia  -- Chronic respiratory failure with hypercapnia  -- Chronic respiratory failure with hypoxia and hypercapnia  -- Other - I will add my own diagnosis  -- Disagree - Not applicable / Not valid  -- Disagree - Clinically unable to determine / Unknown  -- Refer to Clinical Documentation Reviewer    PROVIDER RESPONSE TEXT:    This patient has chronic respiratory failure with hypoxia and hypercapnia.    Query created by: Praveen Montiel on 2024 1:00 PM      Electronically signed by:  TYRELL MORGAN MD 2/3/2024 6:08 AM

## 2024-02-03 NOTE — PLAN OF CARE
Problem: Respiratory - Adult  Goal: Achieves optimal ventilation and oxygenation  2/2/2024 2050 by Reema Cristobal RCP  Outcome: Progressing   BRONCHOSPASM/BRONCHOCONSTRICTION     [x]         IMPROVE AERATION/BREATH SOUNDS  [x]   ADMINISTER BRONCHODILATOR THERAPY AS APPROPRIATE  [x]   ASSESS BREATH SOUNDS  []   IMPLEMENT AEROSOL/MDI PROTOCOL  [x]   PATIENT EDUCATION AS NEEDED  PROVIDE ADEQUATE OXYGENATION WITH ACCEPTABLE SP02/ABG'S    [x]  IDENTIFY APPROPRIATE OXYGEN THERAPY  [x]   MONITOR SP02/ABG'S AS NEEDED   [x]   PATIENT EDUCATION AS NEEDED    NONINVASIVE VENTILATION    PROVIDE OPTIMAL VENTILATION/ACCEPTABLE SPO2   IMPLEMENT NONINVASIVE VENTILATION PROTOCOL   MAINTAIN ACCEPTABLE SPO2   ASSESS SKIN INTEGRITY/BREAKDOWN SCORE   PATIENT EDUCATION AS NEEDED   BIPAP AS NEEDED

## 2024-02-03 NOTE — PROGRESS NOTES
[facility]  Office: [phone]  Goldy Moore DO, Ferdinand Villeda DO, Jose Goff DO, Alejo Mao DO, Mainor Smith MD, Melony Rodriguez MD, Tonia Brenner MD, Alethea Ramsey MD,  Rahul Zheng MD, Josi Saldana MD, Pedro Deras MD,  Lauren Acosta DO, Randee Pantoja MD, Raji Reddy MD, Praveen Moore DO, Veronica Duff MD,  Spencer Ferguson DO, Olivia Boss MD, Veronika Lin MD, Caryl Zacarias MD, Slick Jean MD,  Tacos Yang MD, Guzman Stein MD, Vicente Yee MD, Mickey Avila MD, Conrad Bailey MD, Raisa Felder MD, Travon Blanchard DO, Jole Rome DO, Mariel Francisco MD,  Mika Valentin MD, Shirley Waterhouse, CNP,  Isamar Seals, CNP, Jose Warren, CNP,  Jenna Angulo, DNP, Jeannette Bobo, CNP, Sofya Rose, CNP, Zoe Coates CNP, Lara Yan, CNP, Michelle García, CNP, Tamara Issa, PA-C, Halina Lynch, PA-C, Keri Weaver, CNP, Cheyanne White, CNS, Heide Collier, CNP, Amber Mast, CNP, Tracy Schwab, CNP         [facility]   IN-PATIENT SERVICE   [facility]    Progress Note    2/3/2024    11:39 AM    Name:   Mariangel Abreu  MRN:     [id number]     Acct:      [id number]   Room:   [address] [place] [address] Day:  2  Admit Date:  2/1/2024 12:34 PM    PCP:   Carla Chua APRN - CNP  Code Status:  Full Code    Subjective:     C/C: No chief complaint on file.    Interval History Status: not changed.     No new or worsening symptoms, glucose improved but needs better control. Cr stable at 1.   Denies cp, dizziness, reports still feeling weak  -troponin downtrending        Review of Systems:     Constitutional:  negative for chills, fevers, sweats  Respiratory:  negative for cough, dyspnea on exertion, shortness of breath, wheezing  Cardiovascular:  negative for chest pain, chest pressure/discomfort, lower extremity edema, palpitations  Gastrointestinal:  negative for abdominal pain, constipation, diarrhea, nausea,  2/1/2024 Yes    Chronic diastolic congestive heart failure (HCC) 2/1/2024 Yes    Chronic use of opiate for therapeutic purpose 2/1/2024 Yes    DDD (degenerative disc disease), lumbar 2/1/2024 Yes    DDD (degenerative disc disease), cervical (Chronic) 2/1/2024 Yes    Essential hypertension 2/1/2024 Yes    Mixed hyperlipidemia (Chronic) 2/1/2024 Yes    Overview Signed 9/7/2015  4:50 AM by Ambulatory, Admin     replace inactive diagnosis         Lung nodule 2/1/2024 Yes    Asthma with COPD 2/1/2024 Yes    Stage 3 chronic kidney disease (HCC) 2/1/2024 Yes    Morbid obesity with BMI of 40.0-44.9, adult (HCC) 2/1/2024 Yes    Opioid dependence with current use (HCC) 2/1/2024 Yes    Uncontrolled type 2 diabetes mellitus with hyperglycemia (HCC) 2/1/2024 Yes       #S/p Stent   #Inferior STEMI  - s/p PCI with ELEANOR to mid RCA (culprit), residual high-grade stenosis 80% mid LAD, 80% proximal D1.  Minimal CAD in left circumflex.  On 2/1/2024  -on dapt,statin, metoprolol  -elevated troponins post cath, Cr mildy uptrended but not clinically significant, monitor  -on imdur and metop  -last echo:not in system, echo has been ordered and pending   -concern for right sied preload dependent, monitor HR while on metop and BP on imdur. Imdur is new to her.   -LFTs, prn midodrine, hold imdur until cardio evaluates again  -tele, mg, ptot, dc planning      #Uncontrolled diabetes mellitus type 2  #DKA  -DKA resolved  -hba1c 16  -now off insulin infusion  -back on subC, may need adjustment  Denies vision changes  -consider jardiance outpatient  -needs close monitorign  -consider lisinopril for renal protectant  -diabetic diet  -glucose goal 140-180 while in patient, hypoglycemia protocol     #COPD and HIGINIO,  -on home oxygen 2-3L  -reports underlying emphysema, asthma and copd  -nightly cpap  -former smoker a few years ago  -on home inhaler  -faint wheezes with reduced breath sounds  -will obtain CXR diven her concerns-->

## 2024-02-04 ENCOUNTER — APPOINTMENT (OUTPATIENT)
Dept: GENERAL RADIOLOGY | Age: 74
DRG: 322 | End: 2024-02-04
Payer: MEDICARE

## 2024-02-04 LAB
ANION GAP SERPL CALCULATED.3IONS-SCNC: 5 MMOL/L (ref 9–17)
BUN SERPL-MCNC: 19 MG/DL (ref 8–23)
CALCIUM SERPL-MCNC: 8.4 MG/DL (ref 8.6–10.4)
CHLORIDE SERPL-SCNC: 100 MMOL/L (ref 98–107)
CO2 SERPL-SCNC: 28 MMOL/L (ref 20–31)
CREAT SERPL-MCNC: 0.8 MG/DL (ref 0.5–0.9)
ECHO AO ASC DIAM: 3 CM
ECHO AO ASCENDING AORTA INDEX: 1.46 CM/M2
ECHO AO ROOT DIAM: 3.1 CM
ECHO AO ROOT INDEX: 1.51 CM/M2
ECHO AV AREA PEAK VELOCITY: 2.6 CM2
ECHO AV AREA VTI: 2.5 CM2
ECHO AV AREA/BSA PEAK VELOCITY: 1.3 CM2/M2
ECHO AV AREA/BSA VTI: 1.2 CM2/M2
ECHO AV MEAN GRADIENT: 4 MMHG
ECHO AV MEAN VELOCITY: 0.9 M/S
ECHO AV PEAK GRADIENT: 6 MMHG
ECHO AV PEAK VELOCITY: 1.2 M/S
ECHO AV VELOCITY RATIO: 0.83
ECHO AV VTI: 25.9 CM
ECHO BSA: 2.14 M2
ECHO LA AREA 2C: 15.5 CM2
ECHO LA AREA 4C: 19.1 CM2
ECHO LA DIAMETER INDEX: 2 CM/M2
ECHO LA DIAMETER: 4.1 CM
ECHO LA MAJOR AXIS: 5.5 CM
ECHO LA MINOR AXIS: 4.8 CM
ECHO LA TO AORTIC ROOT RATIO: 1.32
ECHO LA VOL BP: 49 ML (ref 22–52)
ECHO LA VOL MOD A2C: 41 ML (ref 22–52)
ECHO LA VOL MOD A4C: 53 ML (ref 22–52)
ECHO LA VOL/BSA BIPLANE: 24 ML/M2 (ref 16–34)
ECHO LA VOLUME INDEX MOD A2C: 20 ML/M2 (ref 16–34)
ECHO LA VOLUME INDEX MOD A4C: 26 ML/M2 (ref 16–34)
ECHO LV E' LATERAL VELOCITY: 9 CM/S
ECHO LV E' SEPTAL VELOCITY: 6 CM/S
ECHO LV EDV 3D: 127 ML
ECHO LV EDV INDEX 3D: 62 ML/M2
ECHO LV EJECTION FRACTION 3D: 58 %
ECHO LV ESV 3D: 54 ML
ECHO LV ESV INDEX 3D: 26 ML/M2
ECHO LV FRACTIONAL SHORTENING: 28 % (ref 28–44)
ECHO LV INTERNAL DIMENSION DIASTOLE INDEX: 2.44 CM/M2
ECHO LV INTERNAL DIMENSION DIASTOLIC: 5 CM (ref 3.9–5.3)
ECHO LV INTERNAL DIMENSION SYSTOLIC INDEX: 1.76 CM/M2
ECHO LV INTERNAL DIMENSION SYSTOLIC: 3.6 CM
ECHO LV IVSD: 1.3 CM (ref 0.6–0.9)
ECHO LV MASS 2D: 233.7 G (ref 67–162)
ECHO LV MASS 3D INDEX: 86.8 G/M2
ECHO LV MASS 3D: 178 G
ECHO LV MASS INDEX 2D: 114 G/M2 (ref 43–95)
ECHO LV POSTERIOR WALL DIASTOLIC: 1.1 CM (ref 0.6–0.9)
ECHO LV RELATIVE WALL THICKNESS RATIO: 0.44
ECHO LVOT AREA: 3.1 CM2
ECHO LVOT AV VTI INDEX: 0.8
ECHO LVOT DIAM: 2 CM
ECHO LVOT MEAN GRADIENT: 2 MMHG
ECHO LVOT PEAK GRADIENT: 4 MMHG
ECHO LVOT PEAK VELOCITY: 1 M/S
ECHO LVOT STROKE VOLUME INDEX: 31.6 ML/M2
ECHO LVOT SV: 64.7 ML
ECHO LVOT VTI: 20.6 CM
ECHO MV A VELOCITY: 1.09 M/S
ECHO MV AREA VTI: 1.7 CM2
ECHO MV E DECELERATION TIME (DT): 193 MS
ECHO MV E VELOCITY: 0.99 M/S
ECHO MV E/A RATIO: 0.91
ECHO MV E/E' LATERAL: 11
ECHO MV E/E' RATIO (AVERAGED): 13.75
ECHO MV LVOT VTI INDEX: 1.9
ECHO MV MAX VELOCITY: 1 M/S
ECHO MV MEAN GRADIENT: 2 MMHG
ECHO MV MEAN VELOCITY: 0.6 M/S
ECHO MV PEAK GRADIENT: 4 MMHG
ECHO MV VTI: 39.2 CM
ECHO PV MAX VELOCITY: 0.9 M/S
ECHO PV PEAK GRADIENT: 3 MMHG
ECHO RV FREE WALL PEAK S': 10 CM/S
ECHO RV INTERNAL DIMENSION: 3.4 CM
ECHO RV TAPSE: 1.9 CM (ref 1.7–?)
EKG ATRIAL RATE: 95 BPM
EKG P AXIS: 78 DEGREES
EKG P-R INTERVAL: 142 MS
EKG Q-T INTERVAL: 360 MS
EKG QRS DURATION: 82 MS
EKG QTC CALCULATION (BAZETT): 452 MS
EKG R AXIS: -32 DEGREES
EKG T AXIS: 80 DEGREES
EKG VENTRICULAR RATE: 95 BPM
GFR SERPL CREATININE-BSD FRML MDRD: >60 ML/MIN/1.73M2
GLUCOSE BLD-MCNC: 145 MG/DL (ref 65–105)
GLUCOSE BLD-MCNC: 206 MG/DL (ref 65–105)
GLUCOSE BLD-MCNC: 224 MG/DL (ref 65–105)
GLUCOSE BLD-MCNC: 289 MG/DL (ref 65–105)
GLUCOSE SERPL-MCNC: 127 MG/DL (ref 70–99)
POTASSIUM SERPL-SCNC: 3.8 MMOL/L (ref 3.7–5.3)
SODIUM SERPL-SCNC: 133 MMOL/L (ref 135–144)

## 2024-02-04 PROCEDURE — 94761 N-INVAS EAR/PLS OXIMETRY MLT: CPT

## 2024-02-04 PROCEDURE — 2060000000 HC ICU INTERMEDIATE R&B

## 2024-02-04 PROCEDURE — 6370000000 HC RX 637 (ALT 250 FOR IP): Performed by: INTERNAL MEDICINE

## 2024-02-04 PROCEDURE — 36415 COLL VENOUS BLD VENIPUNCTURE: CPT

## 2024-02-04 PROCEDURE — 82947 ASSAY GLUCOSE BLOOD QUANT: CPT

## 2024-02-04 PROCEDURE — 80048 BASIC METABOLIC PNL TOTAL CA: CPT

## 2024-02-04 PROCEDURE — 6370000000 HC RX 637 (ALT 250 FOR IP)

## 2024-02-04 PROCEDURE — 6370000000 HC RX 637 (ALT 250 FOR IP): Performed by: NURSE PRACTITIONER

## 2024-02-04 PROCEDURE — 2580000003 HC RX 258: Performed by: STUDENT IN AN ORGANIZED HEALTH CARE EDUCATION/TRAINING PROGRAM

## 2024-02-04 PROCEDURE — 6360000002 HC RX W HCPCS: Performed by: NURSE PRACTITIONER

## 2024-02-04 PROCEDURE — 6370000000 HC RX 637 (ALT 250 FOR IP): Performed by: STUDENT IN AN ORGANIZED HEALTH CARE EDUCATION/TRAINING PROGRAM

## 2024-02-04 PROCEDURE — 2700000000 HC OXYGEN THERAPY PER DAY

## 2024-02-04 PROCEDURE — 71045 X-RAY EXAM CHEST 1 VIEW: CPT

## 2024-02-04 PROCEDURE — 94640 AIRWAY INHALATION TREATMENT: CPT

## 2024-02-04 PROCEDURE — 99233 SBSQ HOSP IP/OBS HIGH 50: CPT | Performed by: NURSE PRACTITIONER

## 2024-02-04 PROCEDURE — 99232 SBSQ HOSP IP/OBS MODERATE 35: CPT | Performed by: INTERNAL MEDICINE

## 2024-02-04 PROCEDURE — 94660 CPAP INITIATION&MGMT: CPT

## 2024-02-04 RX ORDER — INSULIN GLARGINE 100 [IU]/ML
35 INJECTION, SOLUTION SUBCUTANEOUS NIGHTLY
Status: DISCONTINUED | OUTPATIENT
Start: 2024-02-04 | End: 2024-02-04

## 2024-02-04 RX ORDER — FUROSEMIDE 10 MG/ML
20 INJECTION INTRAMUSCULAR; INTRAVENOUS ONCE
Status: COMPLETED | OUTPATIENT
Start: 2024-02-04 | End: 2024-02-04

## 2024-02-04 RX ORDER — INSULIN GLARGINE 100 [IU]/ML
40 INJECTION, SOLUTION SUBCUTANEOUS DAILY
Status: DISCONTINUED | OUTPATIENT
Start: 2024-02-04 | End: 2024-02-07

## 2024-02-04 RX ORDER — INSULIN GLARGINE 100 [IU]/ML
42 INJECTION, SOLUTION SUBCUTANEOUS 2 TIMES DAILY
Status: DISCONTINUED | OUTPATIENT
Start: 2024-02-04 | End: 2024-02-04

## 2024-02-04 RX ORDER — ALOGLIPTIN 12.5 MG/1
12.5 TABLET, FILM COATED ORAL DAILY
Status: DISCONTINUED | OUTPATIENT
Start: 2024-02-04 | End: 2024-02-08 | Stop reason: HOSPADM

## 2024-02-04 RX ORDER — INSULIN GLARGINE 100 [IU]/ML
35 INJECTION, SOLUTION SUBCUTANEOUS NIGHTLY
Status: DISCONTINUED | OUTPATIENT
Start: 2024-02-04 | End: 2024-02-08 | Stop reason: HOSPADM

## 2024-02-04 RX ADMIN — ACETAMINOPHEN 325MG 650 MG: 325 TABLET ORAL at 11:17

## 2024-02-04 RX ADMIN — ATORVASTATIN CALCIUM 40 MG: 40 TABLET, FILM COATED ORAL at 20:52

## 2024-02-04 RX ADMIN — BISMUTH SUBSALICYLATE 524 MG: 262 TABLET, CHEWABLE ORAL at 16:26

## 2024-02-04 RX ADMIN — BUDESONIDE AND FORMOTEROL FUMARATE DIHYDRATE 2 PUFF: 160; 4.5 AEROSOL RESPIRATORY (INHALATION) at 13:02

## 2024-02-04 RX ADMIN — Medication 10 MG: at 20:56

## 2024-02-04 RX ADMIN — TICAGRELOR 90 MG: 90 TABLET ORAL at 08:44

## 2024-02-04 RX ADMIN — DOCUSATE SODIUM 100 MG: 100 CAPSULE, LIQUID FILLED ORAL at 08:44

## 2024-02-04 RX ADMIN — FUROSEMIDE 20 MG: 10 INJECTION, SOLUTION INTRAMUSCULAR; INTRAVENOUS at 12:18

## 2024-02-04 RX ADMIN — INSULIN GLARGINE 35 UNITS: 100 INJECTION, SOLUTION SUBCUTANEOUS at 08:33

## 2024-02-04 RX ADMIN — FERROUS SULFATE TAB EC 325 MG (65 MG FE EQUIVALENT) 325 MG: 325 (65 FE) TABLET DELAYED RESPONSE at 08:43

## 2024-02-04 RX ADMIN — ALBUTEROL SULFATE 2.5 MG: 2.5 SOLUTION RESPIRATORY (INHALATION) at 14:25

## 2024-02-04 RX ADMIN — ESCITALOPRAM 20 MG: 10 TABLET, FILM COATED ORAL at 08:44

## 2024-02-04 RX ADMIN — INSULIN GLARGINE 35 UNITS: 100 INJECTION, SOLUTION SUBCUTANEOUS at 20:50

## 2024-02-04 RX ADMIN — BISMUTH SUBSALICYLATE 524 MG: 262 TABLET, CHEWABLE ORAL at 11:11

## 2024-02-04 RX ADMIN — METOPROLOL TARTRATE 12.5 MG: 50 TABLET, FILM COATED ORAL at 08:45

## 2024-02-04 RX ADMIN — MIDODRINE HYDROCHLORIDE 2.5 MG: 5 TABLET ORAL at 12:22

## 2024-02-04 RX ADMIN — CLOTRIMAZOLE AND BETAMETHASONE DIPROPIONATE: 10; .5 CREAM TOPICAL at 20:51

## 2024-02-04 RX ADMIN — INSULIN LISPRO 4 UNITS: 100 INJECTION, SOLUTION INTRAVENOUS; SUBCUTANEOUS at 16:26

## 2024-02-04 RX ADMIN — INSULIN LISPRO 4 UNITS: 100 INJECTION, SOLUTION INTRAVENOUS; SUBCUTANEOUS at 12:18

## 2024-02-04 RX ADMIN — TICAGRELOR 90 MG: 90 TABLET ORAL at 20:50

## 2024-02-04 RX ADMIN — SODIUM CHLORIDE, PRESERVATIVE FREE 10 ML: 5 INJECTION INTRAVENOUS at 20:50

## 2024-02-04 RX ADMIN — DOCUSATE SODIUM 100 MG: 100 CAPSULE, LIQUID FILLED ORAL at 20:50

## 2024-02-04 RX ADMIN — ASPIRIN 81 MG 81 MG: 81 TABLET ORAL at 08:44

## 2024-02-04 RX ADMIN — PANTOPRAZOLE SODIUM 40 MG: 40 TABLET, DELAYED RELEASE ORAL at 08:44

## 2024-02-04 RX ADMIN — SODIUM CHLORIDE, PRESERVATIVE FREE 10 ML: 5 INJECTION INTRAVENOUS at 08:47

## 2024-02-04 RX ADMIN — PANTOPRAZOLE SODIUM 40 MG: 40 TABLET, DELAYED RELEASE ORAL at 20:50

## 2024-02-04 RX ADMIN — BENZONATATE 200 MG: 100 CAPSULE ORAL at 08:44

## 2024-02-04 RX ADMIN — BISMUTH SUBSALICYLATE 524 MG: 262 TABLET, CHEWABLE ORAL at 20:50

## 2024-02-04 RX ADMIN — BISMUTH SUBSALICYLATE 524 MG: 262 TABLET, CHEWABLE ORAL at 06:32

## 2024-02-04 RX ADMIN — METOPROLOL TARTRATE 12.5 MG: 50 TABLET, FILM COATED ORAL at 20:50

## 2024-02-04 RX ADMIN — TOPIRAMATE 100 MG: 100 TABLET, FILM COATED ORAL at 20:50

## 2024-02-04 ASSESSMENT — PAIN DESCRIPTION - DESCRIPTORS: DESCRIPTORS: ACHING

## 2024-02-04 ASSESSMENT — PAIN DESCRIPTION - ORIENTATION: ORIENTATION: LEFT

## 2024-02-04 ASSESSMENT — PAIN DESCRIPTION - LOCATION: LOCATION: SHOULDER

## 2024-02-04 ASSESSMENT — PAIN SCALES - GENERAL: PAINLEVEL_OUTOF10: 7

## 2024-02-04 NOTE — PROGRESS NOTES
[facility]  Office: [phone]  Goldy Moore DO, Ferdinand Villeda DO, Jose Goff DO, Alejo Mao DO, Mainor Smith MD, Melony Rodriguez MD, Tonia Brenner MD, Alethea Ramsey MD,  Rahul Zheng MD, Josi Saldana MD, Pedro Deras MD,  Lauren Acosta DO, Randee Pantoja MD, Raji Reddy MD, Praveen Moore DO, Veronica Duff MD,  Spencer Ferguson DO, Olivia Boss MD, Veronika Lin MD, Caryl Zacarias MD, Slick Jean MD,  Tacos Yang MD, Guzman Stein MD, Vicente Yee MD, Mickey Avila MD, Conrad Bailey MD, Raisa Felder MD, Travon Blanchard DO, Joel Rome DO, Mariel Francisco MD,  Mika Valentin MD, Shirley Waterhouse, CNP,  Isamar Seals CNP, Jose Warren, CNP,  Jenna Angulo, NAKUL, Jeannette Bobo, CNP, Sofya Rose, CNP, Zoe Coates CNP, Lara Yan CNP, Michelle García CNP, Tamara Issa, PA-C, Halina Lynch PA-C, Keri Weaver, CNP, Cheyanne White, CNS, Heide Collier, CNP, Amber Mast CNP, Tracy Schwab, CNP         [facility]   IN-PATIENT SERVICE   [facility]    Progress Note    2/4/2024    8:53 AM    Name:   Mariangel Abreu  MRN:     [id number]     Acct:      [id number]   Room:   2017/2017-01  IP Day:  3  Admit Date:  2/1/2024 12:34 PM    PCP:   Carla Chua APRN - CNP  Code Status:  Full Code    Subjective:     C/C:  chest pain    Interval History Status: improved.      Patient is resting in a chair.  She has mild mid-sternal chest \"Pressure\".  She told her nurse about it.  She also feels like she's wheezing.    Brief History:     Per the NP:  \"73-year-old patient with multiple medical conditions listed below who presents to the emergency department for chest pain, Was found to have an inferior STEMI taken for emergent cardiac cath where PCI with ELEANOR to mid RCA was completed.  There is residual high-grade LAD diagonal stenosis requiring staged PCI to LAD in 4 to 6-weeks     We are consulted for medical  management postcardiac cath     On assessment patient is awake and alert resting in bed.  Family in room at time of assessment.  Patient without complaints at this time.  Tolerating 3 L low flow nasal cannula which is her baseline.  Currently denies any chest pain, shortness of breath, nausea, vomiting, diarrhea, constipation, fever, chills, urinary symptoms or pain\"    Review of Systems:     Constitutional:  negative for chills, fevers, sweats  Respiratory:  negative for cough, dyspnea on exertion, + shortness of breath, wheezing  Cardiovascular:  Positive for chest pain, chest pressure/discomfort, No lower extremity edema, palpitations  Gastrointestinal:  negative for abdominal pain, constipation, diarrhea, nausea, vomiting  Neurological:  negative for dizziness, headache    Medications:     Allergies:    Allergies   Allergen Reactions    Robitussin [Guaifenesin] Anaphylaxis    Codeine Swelling    Compazine [Prochlorperazine Maleate] Hives and Swelling    Fentanyl     Iodides Itching    Morphine Other (See Comments)     hallucinations    Moxifloxacin      Other reaction(s): Intolerance-unknown  Other reaction(s): Intolerance-unknown    Reglan [Metoclopramide] Nausea Only    Avelox [Moxifloxacin Hcl In Nacl] Rash     Dermatitis      Cipro Xr Rash     dermatitis    Ofloxacin Rash    Sulfa Antibiotics Rash       Current Meds:   Scheduled Meds:    midodrine  2.5 mg Oral TID WC    clotrimazole-betamethasone   Topical BID    bismuth subsalicylate  524 mg Oral 4x Daily AC & HS    insulin lispro  0-16 Units SubCUTAneous TID WC    insulin lispro  0-4 Units SubCUTAneous Nightly    sodium chloride flush  10 mL IntraVENous 2 times per day    aspirin  81 mg Oral Daily    ticagrelor  90 mg Oral BID    escitalopram  20 mg Oral QAM    budesonide-formoterol  2 puff Inhalation BID RT    atorvastatin  40 mg Oral Daily    docusate sodium  100 mg Oral BID    ferrous sulfate  325 mg Oral Daily with breakfast    metoprolol tartrate   Cardiology primary  DM2- uncontrolled, HbA1C- 14.7, pt not taking Trulicity at home, increase Lantus 42 units daily, con't lantus 35 units qhs, resume Metformin 1000mg BID and Nesina, monitor and control BS, SSI  COPD- calling respiratory for Duoneb treatment now, agree with CXR  MO- BMI 42, diet and weight loss encouraged  HIGINIO- Cpap at night  HTN- BP low normal, cont Midodrine TID  CKD 3- monitor Bun /Cr, avoid nephrotoxins  RML lung nodule- f/u outpatient  DDD, chronic pain- WC bound  DVT proph  PT/OT    Will follow    Melony Rodriguez MD  2/4/2024  8:53 AM

## 2024-02-04 NOTE — PLAN OF CARE
Problem: Respiratory - Adult  Goal: Achieves optimal ventilation and oxygenation  2/3/2024 2002 by Reema Cristobal RCP  Outcome: Progressing   BRONCHOSPASM/BRONCHOCONSTRICTION     [x]         IMPROVE AERATION/BREATH SOUNDS  [x]   ADMINISTER BRONCHODILATOR THERAPY AS APPROPRIATE  [x]   ASSESS BREATH SOUNDS  []   IMPLEMENT AEROSOL/MDI PROTOCOL  [x]   PATIENT EDUCATION AS NEEDED  PROVIDE ADEQUATE OXYGENATION WITH ACCEPTABLE SP02/ABG'S    [x]  IDENTIFY APPROPRIATE OXYGEN THERAPY  [x]   MONITOR SP02/ABG'S AS NEEDED   [x]   PATIENT EDUCATION AS NEEDED  NONINVASIVE VENTILATION    PROVIDE OPTIMAL VENTILATION/ACCEPTABLE SPO2   IMPLEMENT NONINVASIVE VENTILATION PROTOCOL   MAINTAIN ACCEPTABLE SPO2   ASSESS SKIN INTEGRITY/BREAKDOWN SCORE   PATIENT EDUCATION AS NEEDED   BIPAP AS NEEDED

## 2024-02-04 NOTE — PROGRESS NOTES
\"TROPONINI\" in the last 72 hours.  BNP: No results for input(s): \"BNP\" in the last 72 hours.  Lipids: No results for input(s): \"CHOL\", \"HDL\" in the last 72 hours.    Invalid input(s): \"LDLCALCU\"  INR:   Recent Labs     02/01/24  1244   INR 1.0         Objective:   Vitals: /67   Pulse 82   Temp 98.2 °F (36.8 °C) (Axillary)   Resp 23   Ht 1.575 m (5' 2.01\")   Wt 106.7 kg (235 lb 3.7 oz)   LMP  (LMP Unknown)   SpO2 94%   BMI 43.01 kg/m²   General appearance: alert and cooperative with exam  HEENT: Normocephalic, atraumatic   Neck: no carotid bruit, no JVD, trachea midline and thyroid not enlarged  Lungs: rhonchi to auscultation bilaterally on 02   Heart: regular rate and rhythm, S1, S2 normal, no murmur  Abdomen: soft, bowel sounds normal  Extremities: no edema or swelling     EKG:  Inferior ST elevation, NSR    ECHO: 2/3/24    Left Ventricle: Low normal left ventricular systolic function with a visually estimated EF of 50 - 55%. Left ventricle size is normal. Mildly increased wall thickness. See diagram for wall motion findings. Normal diastolic function.    No significant valvular stenosis or regurgitation seen    Aortic Valve: Trileaflet valve. Sclerosis of the aortic valve cusp.    Image quality is technically difficult.      Pending Left main: Normal with 0% stenosis.     LAD: 80% mid stenosis  First diagonal with 80% proximal stenosis very small vessel     LCX: Luminal irregularities of 20 to 30%     RCA: 99% mid long stenosis, length is 50 mm, GELACIO II flow, underwent PCI with ELEANOR using 3.0 x 38 mm and 3.5 x 18 mm [place] stents postdilated with 3.5 NC balloon with 0% residual stenosis and GELACIO-3 flow.     The LV gram was performed in the NAVARRO 30 position.   LVEF: 45%. LV Wall Motion: Inferior apical akinesis     Conclusions:  Successful PCI with ELEANRO to mid RCA the culprit for STEMI  Residual high-grade LAD diagonal stenosis  Minimal LCx disease  Mildly reduced LV systolic function      Recommendation:  Routine post MI/PCI orders  Medical treatments.  Risk factors modifications.  Staged PCI to the LAD in 4 to 6 weeks    STRESS:    Assessment:   Inferior STEMI s/p PCI with ELEANOR to mid RCA (culprit), residual high-grade stenosis 80% mid LAD, 80% proximal D1.  Minimal CAD in left circumflex.  On 2/1/2024  Uncontrolled diabetes mellitus type 2  COPD and HIGINIO, on home oxygen  Chronic respiratory failure with hypoxia and hypercapnia.  Hyperlipidemia  Hypertension  Morbid obesity    Plan:   Patient remained hemodynamically stable.  In NSR.  Continue aspirin 81 mg, Brilinta 90 mg twice daily, Lipitor 40 mg nightly.  Continue low dose metoprolol. Continue midodrine 2.5mg po TID  Troponin down trending now   Preserved EF   Congestion.  Will give 1 dose of IV lasix   I/S.  Will get CXR. Pt to get to to chair today, Increase activity.  Up to bathroom   Internal medicine on board, appreciate recommendation.  PT/OT, cardiac rehab upon discharge  Discussed with REUBEN Romo CNP

## 2024-02-05 ENCOUNTER — CARE COORDINATION (OUTPATIENT)
Dept: CARE COORDINATION | Age: 74
End: 2024-02-05

## 2024-02-05 LAB
EKG ATRIAL RATE: 76 BPM
EKG P AXIS: 53 DEGREES
EKG P-R INTERVAL: 140 MS
EKG Q-T INTERVAL: 420 MS
EKG QRS DURATION: 78 MS
EKG QTC CALCULATION (BAZETT): 472 MS
EKG R AXIS: -45 DEGREES
EKG T AXIS: 75 DEGREES
EKG VENTRICULAR RATE: 76 BPM
GLUCOSE BLD-MCNC: 158 MG/DL (ref 65–105)
GLUCOSE BLD-MCNC: 223 MG/DL (ref 65–105)
SARS-COV-2 RDRP RESP QL NAA+PROBE: NOT DETECTED
SPECIMEN DESCRIPTION: NORMAL

## 2024-02-05 PROCEDURE — 2580000003 HC RX 258: Performed by: STUDENT IN AN ORGANIZED HEALTH CARE EDUCATION/TRAINING PROGRAM

## 2024-02-05 PROCEDURE — 99232 SBSQ HOSP IP/OBS MODERATE 35: CPT | Performed by: INTERNAL MEDICINE

## 2024-02-05 PROCEDURE — 2700000000 HC OXYGEN THERAPY PER DAY

## 2024-02-05 PROCEDURE — 6370000000 HC RX 637 (ALT 250 FOR IP): Performed by: STUDENT IN AN ORGANIZED HEALTH CARE EDUCATION/TRAINING PROGRAM

## 2024-02-05 PROCEDURE — 6370000000 HC RX 637 (ALT 250 FOR IP): Performed by: INTERNAL MEDICINE

## 2024-02-05 PROCEDURE — 6360000002 HC RX W HCPCS: Performed by: NURSE PRACTITIONER

## 2024-02-05 PROCEDURE — 87635 SARS-COV-2 COVID-19 AMP PRB: CPT

## 2024-02-05 PROCEDURE — 94660 CPAP INITIATION&MGMT: CPT

## 2024-02-05 PROCEDURE — 94640 AIRWAY INHALATION TREATMENT: CPT

## 2024-02-05 PROCEDURE — 94761 N-INVAS EAR/PLS OXIMETRY MLT: CPT

## 2024-02-05 PROCEDURE — 97530 THERAPEUTIC ACTIVITIES: CPT

## 2024-02-05 PROCEDURE — 2060000000 HC ICU INTERMEDIATE R&B

## 2024-02-05 PROCEDURE — 6370000000 HC RX 637 (ALT 250 FOR IP): Performed by: NURSE PRACTITIONER

## 2024-02-05 PROCEDURE — 99233 SBSQ HOSP IP/OBS HIGH 50: CPT | Performed by: SURGERY

## 2024-02-05 PROCEDURE — 6370000000 HC RX 637 (ALT 250 FOR IP)

## 2024-02-05 PROCEDURE — 97116 GAIT TRAINING THERAPY: CPT

## 2024-02-05 PROCEDURE — 82947 ASSAY GLUCOSE BLOOD QUANT: CPT

## 2024-02-05 PROCEDURE — 93010 ELECTROCARDIOGRAM REPORT: CPT | Performed by: INTERNAL MEDICINE

## 2024-02-05 PROCEDURE — 97110 THERAPEUTIC EXERCISES: CPT

## 2024-02-05 RX ORDER — PREDNISONE 20 MG/1
40 TABLET ORAL DAILY
Status: COMPLETED | OUTPATIENT
Start: 2024-02-05 | End: 2024-02-07

## 2024-02-05 RX ADMIN — BISMUTH SUBSALICYLATE 524 MG: 262 TABLET, CHEWABLE ORAL at 11:37

## 2024-02-05 RX ADMIN — ESCITALOPRAM 20 MG: 10 TABLET, FILM COATED ORAL at 08:12

## 2024-02-05 RX ADMIN — PREDNISONE 40 MG: 20 TABLET ORAL at 14:32

## 2024-02-05 RX ADMIN — ALOGLIPTIN 12.5 MG: 12.5 TABLET, FILM COATED ORAL at 08:12

## 2024-02-05 RX ADMIN — BUDESONIDE AND FORMOTEROL FUMARATE DIHYDRATE 2 PUFF: 160; 4.5 AEROSOL RESPIRATORY (INHALATION) at 08:01

## 2024-02-05 RX ADMIN — BUDESONIDE AND FORMOTEROL FUMARATE DIHYDRATE 2 PUFF: 160; 4.5 AEROSOL RESPIRATORY (INHALATION) at 20:55

## 2024-02-05 RX ADMIN — INSULIN GLARGINE 40 UNITS: 100 INJECTION, SOLUTION SUBCUTANEOUS at 08:12

## 2024-02-05 RX ADMIN — ASPIRIN 81 MG 81 MG: 81 TABLET ORAL at 08:15

## 2024-02-05 RX ADMIN — ATORVASTATIN CALCIUM 40 MG: 40 TABLET, FILM COATED ORAL at 21:32

## 2024-02-05 RX ADMIN — ALBUTEROL SULFATE 2.5 MG: 2.5 SOLUTION RESPIRATORY (INHALATION) at 12:18

## 2024-02-05 RX ADMIN — PANTOPRAZOLE SODIUM 40 MG: 40 TABLET, DELAYED RELEASE ORAL at 08:15

## 2024-02-05 RX ADMIN — CLOTRIMAZOLE AND BETAMETHASONE DIPROPIONATE: 10; .5 CREAM TOPICAL at 21:51

## 2024-02-05 RX ADMIN — METFORMIN HYDROCHLORIDE 1000 MG: 500 TABLET ORAL at 08:12

## 2024-02-05 RX ADMIN — DOCUSATE SODIUM 100 MG: 100 CAPSULE, LIQUID FILLED ORAL at 21:33

## 2024-02-05 RX ADMIN — INSULIN LISPRO 4 UNITS: 100 INJECTION, SOLUTION INTRAVENOUS; SUBCUTANEOUS at 11:43

## 2024-02-05 RX ADMIN — METOPROLOL TARTRATE 12.5 MG: 50 TABLET, FILM COATED ORAL at 08:14

## 2024-02-05 RX ADMIN — TOPIRAMATE 100 MG: 100 TABLET, FILM COATED ORAL at 21:33

## 2024-02-05 RX ADMIN — METOPROLOL TARTRATE 12.5 MG: 50 TABLET, FILM COATED ORAL at 21:52

## 2024-02-05 RX ADMIN — ACETAMINOPHEN 325MG 650 MG: 325 TABLET ORAL at 08:22

## 2024-02-05 RX ADMIN — TICAGRELOR 90 MG: 90 TABLET ORAL at 21:33

## 2024-02-05 RX ADMIN — PANTOPRAZOLE SODIUM 40 MG: 40 TABLET, DELAYED RELEASE ORAL at 21:33

## 2024-02-05 RX ADMIN — TICAGRELOR 90 MG: 90 TABLET ORAL at 08:12

## 2024-02-05 RX ADMIN — BISMUTH SUBSALICYLATE 524 MG: 262 TABLET, CHEWABLE ORAL at 16:30

## 2024-02-05 RX ADMIN — DOCUSATE SODIUM 100 MG: 100 CAPSULE, LIQUID FILLED ORAL at 08:15

## 2024-02-05 RX ADMIN — Medication 10 MG: at 22:52

## 2024-02-05 RX ADMIN — BISMUTH SUBSALICYLATE 524 MG: 262 TABLET, CHEWABLE ORAL at 21:33

## 2024-02-05 RX ADMIN — SODIUM CHLORIDE, PRESERVATIVE FREE 10 ML: 5 INJECTION INTRAVENOUS at 21:34

## 2024-02-05 RX ADMIN — METFORMIN HYDROCHLORIDE 1000 MG: 500 TABLET ORAL at 16:30

## 2024-02-05 RX ADMIN — FERROUS SULFATE TAB EC 325 MG (65 MG FE EQUIVALENT) 325 MG: 325 (65 FE) TABLET DELAYED RESPONSE at 08:12

## 2024-02-05 RX ADMIN — INSULIN GLARGINE 35 UNITS: 100 INJECTION, SOLUTION SUBCUTANEOUS at 21:51

## 2024-02-05 RX ADMIN — SODIUM CHLORIDE, PRESERVATIVE FREE 10 ML: 5 INJECTION INTRAVENOUS at 08:16

## 2024-02-05 RX ADMIN — MIDODRINE HYDROCHLORIDE 2.5 MG: 5 TABLET ORAL at 11:37

## 2024-02-05 ASSESSMENT — PAIN DESCRIPTION - ORIENTATION: ORIENTATION: LOWER;MID

## 2024-02-05 ASSESSMENT — PAIN DESCRIPTION - PAIN TYPE: TYPE: CHRONIC PAIN

## 2024-02-05 ASSESSMENT — PAIN DESCRIPTION - DESCRIPTORS: DESCRIPTORS: ACHING;DISCOMFORT

## 2024-02-05 ASSESSMENT — PAIN DESCRIPTION - FREQUENCY: FREQUENCY: INTERMITTENT

## 2024-02-05 ASSESSMENT — PAIN SCALES - GENERAL: PAINLEVEL_OUTOF10: 8

## 2024-02-05 ASSESSMENT — PAIN DESCRIPTION - LOCATION: LOCATION: BACK

## 2024-02-05 ASSESSMENT — PAIN - FUNCTIONAL ASSESSMENT: PAIN_FUNCTIONAL_ASSESSMENT: PREVENTS OR INTERFERES SOME ACTIVE ACTIVITIES AND ADLS

## 2024-02-05 ASSESSMENT — PAIN DESCRIPTION - ONSET: ONSET: ON-GOING

## 2024-02-05 NOTE — PLAN OF CARE
Problem: Chronic Conditions and Co-morbidities  Goal: Patient's chronic conditions and co-morbidity symptoms are monitored and maintained or improved  2/5/2024 1818 by Kalia Rae RN  Outcome: Progressing  Flowsheets (Taken 2/5/2024 0800 by Holly Navarro, RN)  Care Plan - Patient's Chronic Conditions and Co-Morbidity Symptoms are Monitored and Maintained or Improved: Monitor and assess patient's chronic conditions and comorbid symptoms for stability, deterioration, or improvement     Problem: Pain  Goal: Verbalizes/displays adequate comfort level or baseline comfort level  2/5/2024 1818 by Kalia Rae, RN  Outcome: Progressing     Problem: Discharge Planning  Goal: Discharge to home or other facility with appropriate resources  2/5/2024 1818 by Kalia Rae RN  Outcome: Progressing  Flowsheets (Taken 2/5/2024 0800 by Holly Navarro, RN)  Discharge to home or other facility with appropriate resources: Identify barriers to discharge with patient and caregiver

## 2024-02-05 NOTE — PROGRESS NOTES
Physical Therapy        Physical Therapy Cancel Note      DATE: 2024    NAME: Mariangel Abreu  MRN: [id number]   : 1950      Patient not seen this date for Physical Therapy due to: Pt denied therapy this morning d/t not feeling good. Pt was desating at 86% while lying in bed and helped pt with breathing techniques and raised HOB.           Electronically signed by Dylan Carmen PTA on 2024 at 8:59 AM

## 2024-02-05 NOTE — PROGRESS NOTES
[facility]   Progress Note                   Date:   2/5/2024  Patient name: Mariangel Abreu  Date of admission:  2/1/2024 12:34 PM  MRN:   [id number]  YOB: 1950  PCP: Carla Chua, VERONIKA - CNP    Reason for Admission: Inferior STEMI.    Subjective:   Patient seen and examined. Denies chest pain or increased shortness of breath. Tele/vitals/labs reviewed . Feeling weak and not sure if can be discharged safely home     Medications:   Scheduled Meds:   metFORMIN  1,000 mg Oral BID WC    alogliptin  12.5 mg Oral Daily    insulin glargine  35 Units SubCUTAneous Nightly    insulin glargine  40 Units SubCUTAneous Daily    midodrine  2.5 mg Oral TID WC    clotrimazole-betamethasone   Topical BID    bismuth subsalicylate  524 mg Oral 4x Daily AC & HS    insulin lispro  0-16 Units SubCUTAneous TID WC    insulin lispro  0-4 Units SubCUTAneous Nightly    sodium chloride flush  10 mL IntraVENous 2 times per day    aspirin  81 mg Oral Daily    ticagrelor  90 mg Oral BID    escitalopram  20 mg Oral QAM    budesonide-formoterol  2 puff Inhalation BID RT    atorvastatin  40 mg Oral Daily    docusate sodium  100 mg Oral BID    ferrous sulfate  325 mg Oral Daily with breakfast    metoprolol tartrate  12.5 mg Oral BID    [Held by provider] oxyBUTYnin  5 mg Oral Daily    pantoprazole  40 mg Oral BID    topiramate  100 mg Oral Nightly     Continuous Infusions:   sodium chloride 50 mL/hr at 02/03/24 0601    dextrose      dextrose       CBC:   Recent Labs     02/03/24  1047   WBC 7.4   HGB 13.2   PLT See Reflexed IPF Result       BMP:    Recent Labs     02/03/24  0452 02/03/24  1047 02/04/24  0601   * 134* 133*   K 4.0 4.1 3.8   CL 98 99 100   CO2 24 24 28   BUN 33* 28* 19   CREATININE 1.0* 1.0* 0.8   GLUCOSE 225* 231* 127*       Hepatic:   Recent Labs     02/02/24  1351 02/03/24  1047   AST 59* 46*   ALT 21 23   BILITOT 0.3 0.2*   ALKPHOS 64 61       Troponin: No results for input(s):  \"TROPONINI\" in the last 72 hours.  BNP: No results for input(s): \"BNP\" in the last 72 hours.  Lipids: No results for input(s): \"CHOL\", \"HDL\" in the last 72 hours.    Invalid input(s): \"LDLCALCU\"  INR:   No results for input(s): \"INR\" in the last 72 hours.      Objective:   Vitals: BP (!) 104/54   Pulse 78   Temp 98 °F (36.7 °C) (Oral)   Resp 22   Ht 1.575 m (5' 2.01\")   Wt 106.7 kg (235 lb 3.7 oz)   LMP  (LMP Unknown)   SpO2 94%   BMI 43.01 kg/m²   General appearance: alert and cooperative with exam  HEENT: Normocephalic, atraumatic   Neck: no carotid bruit, no JVD, trachea midline and thyroid not enlarged  Lungs: rhonchi to auscultation bilaterally on 02   Heart: regular rate and rhythm, S1, S2 normal, no murmur  Abdomen: soft, bowel sounds normal  Extremities: no edema or swelling     EKG:  Inferior ST elevation, NSR    ECHO: 2/3/24    Left Ventricle: Low normal left ventricular systolic function with a visually estimated EF of 50 - 55%. Left ventricle size is normal. Mildly increased wall thickness. See diagram for wall motion findings. Normal diastolic function.    No significant valvular stenosis or regurgitation seen    Aortic Valve: Trileaflet valve. Sclerosis of the aortic valve cusp.    Image quality is technically difficult.      Pending Left main: Normal with 0% stenosis.     LAD: 80% mid stenosis  First diagonal with 80% proximal stenosis very small vessel     LCX: Luminal irregularities of 20 to 30%     RCA: 99% mid long stenosis, length is 50 mm, GELACIO II flow, underwent PCI with ELEANOR using 3.0 x 38 mm and 3.5 x 18 mm Syed stents postdilated with 3.5 NC balloon with 0% residual stenosis and GELACIO-3 flow.     The LV gram was performed in the NAVARRO 30 position.   LVEF: 45%. LV Wall Motion: Inferior apical akinesis     Conclusions:  Successful PCI with ELEANOR to mid RCA the culprit for STEMI  Residual high-grade LAD diagonal stenosis  Minimal LCx disease  Mildly reduced LV systolic function

## 2024-02-05 NOTE — PROGRESS NOTES
02/04/24 2117   Care Plan - Respiratory Goals   Achieves optimal ventilation and oxygenation Assess for changes in respiratory status;Assess for changes in mentation and behavior;Position to facilitate oxygenation and minimize respiratory effort;Oxygen supplementation based on oxygen saturation or arterial blood gases;Encourage broncho-pulmonary hygiene including cough, deep breathe, incentive spirometry;Assess the need for suctioning and aspirate as needed;Assess and instruct to report shortness of breath or any respiratory difficulty;Respiratory therapy support as indicated

## 2024-02-05 NOTE — PROGRESS NOTES
[facility]  Office: [phone]  Goldy Moore DO, Ferdinand Villeda DO, Jose Goff DO, Alejo Mao DO, Mainor Smith MD, Melony Rodriguez MD, Tonia Brenner MD, Alethea Ramsey MD,  Rahul Zheng MD, Josi Saldana MD, Pedro Deras MD,  Lauren Acosta DO, Randee Pantoja MD, Raji Reddy MD, Praveen Moore DO, Veronica Duff MD,  Spencer Ferguson DO, Olivia Boss MD, Veronika Lin MD, Caryl Zacarias MD, Slick Jean MD,  Tacos Yang MD, Guzman Stein MD, Vicente Yee MD, Mickey Avila MD, Conrad Bailey MD, Raisa Felder MD, Travon Blanchard DO, Joel Rome DO, Mariel Francisco MD,  Mika Valentin MD, Shirley Waterhouse, CNP,  Isamar Seals CNP, Jose Warren, CNP,  Jenna Angulo, NAKUL, Jeannette Bobo, CNP, Sofya Rose, CNP, Zoe Coates CNP, Lara Yan CNP, Michelle García CNP, Tamara Issa, PA-C, Halina Lynch PA-C, Keri Weaver, CNP, Cheyanne White, CNS, Heide Collier, CNP, Amber Mast CNP, Tracy Schwab, CNP         [facility]   IN-PATIENT SERVICE   [facility]    Progress Note    2/5/2024    8:49 AM    Name:   Mariangel Abreu  MRN:     [id number]     Acct:      [id number]   Room:   2017/2017-01  IP Day:  4  Admit Date:  2/1/2024 12:34 PM    PCP:   Carla Chua APRN - CNP  Code Status:  Full Code    Subjective:     C/C:  chest pain    Interval History Status: improved.      Patient is resting in a chair.  She feels like she's wheezing and is still SOB.  Her -200s.  She is waiting for placement at a SNF.      Brief History:     Per the NP:  \"73-year-old patient with multiple medical conditions listed below who presents to the emergency department for chest pain, Was found to have an inferior STEMI taken for emergent cardiac cath where PCI with ELEANOR to mid RCA was completed.  There is residual high-grade LAD diagonal stenosis requiring staged PCI to LAD in 4 to 6-weeks     We are consulted for medical management  affect  Lungs:  Decreased bilaterally bilat with occasional wheezing  Heart:  regular rate and rhythm, no murmur  Abdomen:  soft, nontender, obese, normal bowel sounds, no masses, hepatomegaly, splenomegaly  Extremities:  no edema, redness, tenderness in the calves  Skin:  no gross lesions, rashes, induration    Assessment:        Hospital Problems             Last Modified POA    * (Principal) STEMI (ST elevation myocardial infarction) ([facility]) 2/1/2024 Yes    COPD (chronic obstructive pulmonary disease) 2/1/2024 Yes    Chronic diastolic congestive heart failure (HCC) 2/1/2024 Yes    Chronic use of opiate for therapeutic purpose 2/1/2024 Yes    DDD (degenerative disc disease), lumbar 2/1/2024 Yes    DDD (degenerative disc disease), cervical (Chronic) 2/1/2024 Yes    Essential hypertension 2/1/2024 Yes    Mixed hyperlipidemia (Chronic) 2/1/2024 Yes    Overview Signed 9/7/2015  4:50 AM by Ambulatory, Admin     replace inactive diagnosis         Lung nodule 2/1/2024 Yes    Asthma with COPD 2/1/2024 Yes    Stage 3 chronic kidney disease (HCC) 2/1/2024 Yes    Morbid obesity with BMI of 40.0-44.9, adult ([facility]) 2/1/2024 Yes    Opioid dependence with current use ([facility]) 2/1/2024 Yes    Uncontrolled type 2 diabetes mellitus with hyperglycemia ([facility]) 2/1/2024 Yes       Plan:        Inferior STEMI s/p ELEANOR to mid RCA, residual stenosis 80 LAD and D1- con't ASA, statin, Brilinta, BB, Cardiology primary  DM2- uncontrolled, HbA1C- 14.7, pt not taking Trulicity at home, increase Lantus 42 units daily, con't lantus 35 units qhs, resume Metformin 1000mg BID and Nesina, monitor and control BS, SSI  COPD- calling respiratory for Duoneb treatment now,  CXR negative yesterday, add Prednisone 40mg daily x 3 days for wheezing  MO- BMI 42, diet and weight loss encouraged  HIGINIO- Cpap at night  HTN- BP low normal, cont Midodrine TID  CKD 3- monitor Bun /Cr, avoid nephrotoxins  RML lung nodule- f/u outpatient  DDD, chronic pain- WC bound  DVT  proph  PT/OT    Will follow    Melony Rodriguez MD  2/5/2024  8:49 AM

## 2024-02-05 NOTE — PLAN OF CARE
Problem: Chronic Conditions and Co-morbidities  Goal: Patient's chronic conditions and co-morbidity symptoms are monitored and maintained or improved  Outcome: Progressing     Problem: Pain  Goal: Verbalizes/displays adequate comfort level or baseline comfort level  Outcome: Progressing     Problem: Discharge Planning  Goal: Discharge to home or other facility with appropriate resources  Outcome: Progressing     Problem: Skin/Tissue Integrity  Goal: Absence of new skin breakdown  Description: 1.  Monitor for areas of redness and/or skin breakdown  2.  Assess vascular access sites hourly  3.  Every 4-6 hours minimum:  Change oxygen saturation probe site  4.  Every 4-6 hours:  If on nasal continuous positive airway pressure, respiratory therapy assess nares and determine need for appliance change or resting period.  Outcome: Progressing     Problem: Safety - Adult  Goal: Free from fall injury  Outcome: Progressing     Problem: ABCDS Injury Assessment  Goal: Absence of physical injury  Outcome: Progressing     Problem: Respiratory - Adult  Goal: Achieves optimal ventilation and oxygenation  Outcome: Progressing  Flowsheets (Taken 2/4/2024 2117 by Kellee Steni RCP)  Achieves optimal ventilation and oxygenation:   Assess for changes in respiratory status   Assess for changes in mentation and behavior   Position to facilitate oxygenation and minimize respiratory effort   Oxygen supplementation based on oxygen saturation or arterial blood gases   Encourage broncho-pulmonary hygiene including cough, deep breathe, incentive spirometry   Assess the need for suctioning and aspirate as needed   Assess and instruct to report shortness of breath or any respiratory difficulty   Respiratory therapy support as indicated

## 2024-02-05 NOTE — CARE COORDINATION
Met with patient to discuss transitional planning. She is not sure if she is able to return home or will need to go to SNF. Provided patient with SNF list and home care list. Awaiting choices. Requested PT to see patient    1500 met with patient. She would like to go to SNF. Referrals sent to [facility] and [facility]    1550 received call from [facility] from [facility]. They are able to accept. She will start precert. Patient updated

## 2024-02-05 NOTE — PROGRESS NOTES
Physical Therapy  Facility/Department: [facility] CAR 2- STEPDOWN  Physical Therapy Treatment Note  Name: Mariangel Abreu  : 1950  MRN: [id number]  Date of Service: 2024    Discharge Recommendations:  Patient would benefit from continued therapy after discharge Further therapy recommended at discharge.The patient should be able to tolerate at least 3 hours of therapy per day over 5 days or 15 hours over 7 days.   This patient may benefit from a Physical Medicine and Rehab consult.    PT Equipment Recommendations  Equipment Needed: No      Patient Diagnosis(es): The primary encounter diagnosis was ST elevation myocardial infarction (STEMI), unspecified artery ([facility]). Diagnoses of ST elevation myocardial infarction involving right coronary artery ([facility]) and S/P primary angioplasty with coronary stent were also pertinent to this visit.  Past Medical History:  has a past medical history of Abdominal bloating, Adenomatous polyp of ascending colon, Allergic rhinitis, Anxiety, Arthritis, Asthma, Atrial fibrillation ([facility]), Back pain, Benign hypertension with CKD (chronic kidney disease) stage III ([facility]), CAD (coronary artery disease), Caffeine use, CHF (congestive heart failure) ([facility]), Chronic kidney disease, CKD (chronic kidney disease) stage 3, GFR 30-59 ml/min ([facility]), COPD (chronic obstructive pulmonary disease) ([facility]), Degeneration of lumbar or lumbosacral intervertebral disc, Depression, DM (diabetes mellitus) ([facility]), Emphysema of lung ([facility]), Gastritis, GERD (gastroesophageal reflux disease), Hearing loss, Hematuria, Hiatal hernia, HTN (hypertension), benign, Hypertriglyceridemia, Incontinence of urine, Irritable bowel syndrome with both constipation and diarrhea, Kidney stone, Knee arthropathy, Lumbosacral spondylosis without myelopathy, Migraine headache, Mumps, Neuropathy, Obesity, On home oxygen therapy, HIGINIO on CPAP, Otitis media, Secondary diabetes mellitus with stage 3 chronic kidney disease (GFR 30-59) ([facility]),  and ambulated to the restroom. Pt ambulated from toilet  to sink ~4ft without AD. Pt required the assistance of the RW to get back to the recliner.  More Ambulation?: No  Stairs/Curb  Stairs?: No     Balance  Posture: Fair  Sitting - Static: Good  Sitting - Dynamic: Good;-  Standing - Static: Fair  Standing - Dynamic: Fair  Comments: sitting balance assessed at the EOC and toilet, standing balance assessed while using a RW.  Exercise Treatment: Seated LE exercise program: Long Arc Quads, hip abduction/adduction, heel/toe raises, marches, bicep curls and tricep extensions x20 reps     AM-PAC - Mobility    AM-PAC Basic Mobility - Inpatient   How much help is needed turning from your back to your side while in a flat bed without using bedrails?: None  How much help is needed moving from lying on your back to sitting on the side of a flat bed without using bedrails?: A Little  How much help is needed moving to and from a bed to a chair?: A Little  How much help is needed standing up from a chair using your arms?: A Little  How much help is needed walking in hospital room?: A Lot  How much help is needed climbing 3-5 steps with a railing?: Total  AM-PAC Inpatient Mobility Raw Score : 16  AM-PAC Inpatient T-Scale Score : 40.78  Mobility Inpatient CMS 0-100% Score: 54.16  Mobility Inpatient CMS G-Code Modifier : CK    Goals  Short Term Goals  Time Frame for Short Term Goals: 14 visits  Short Term Goal 1: Pt will perform sit<>stand transfer with supervision.  Short Term Goal 2: Pt will ambulate 100 feet with least restrictive AD and SBA.  Short Term Goal 3: Pt will demonstrate good- dynamic standing balance to decrease fall risk.  Short Term Goal 4: Pt will tolerate a 35 minute therapy session to promote increased endurance.  Short Term Goal 5: Pt will perform supine<>sit transfer with supervision.       Education  Patient Education  Education Given To: Patient  Education Provided: Plan of Care;Role of Therapy;Transfer

## 2024-02-06 LAB
GLUCOSE BLD-MCNC: 165 MG/DL (ref 65–105)
GLUCOSE BLD-MCNC: 187 MG/DL (ref 65–105)
GLUCOSE BLD-MCNC: 188 MG/DL (ref 65–105)
GLUCOSE BLD-MCNC: 256 MG/DL (ref 65–105)
GLUCOSE BLD-MCNC: 261 MG/DL (ref 65–105)
GLUCOSE BLD-MCNC: 301 MG/DL (ref 65–105)

## 2024-02-06 PROCEDURE — 6370000000 HC RX 637 (ALT 250 FOR IP): Performed by: STUDENT IN AN ORGANIZED HEALTH CARE EDUCATION/TRAINING PROGRAM

## 2024-02-06 PROCEDURE — 6370000000 HC RX 637 (ALT 250 FOR IP): Performed by: NURSE PRACTITIONER

## 2024-02-06 PROCEDURE — 94640 AIRWAY INHALATION TREATMENT: CPT

## 2024-02-06 PROCEDURE — 99233 SBSQ HOSP IP/OBS HIGH 50: CPT

## 2024-02-06 PROCEDURE — 6370000000 HC RX 637 (ALT 250 FOR IP): Performed by: INTERNAL MEDICINE

## 2024-02-06 PROCEDURE — 2700000000 HC OXYGEN THERAPY PER DAY

## 2024-02-06 PROCEDURE — 94761 N-INVAS EAR/PLS OXIMETRY MLT: CPT

## 2024-02-06 PROCEDURE — 2060000000 HC ICU INTERMEDIATE R&B

## 2024-02-06 PROCEDURE — 99232 SBSQ HOSP IP/OBS MODERATE 35: CPT | Performed by: STUDENT IN AN ORGANIZED HEALTH CARE EDUCATION/TRAINING PROGRAM

## 2024-02-06 PROCEDURE — 97535 SELF CARE MNGMENT TRAINING: CPT

## 2024-02-06 PROCEDURE — 6370000000 HC RX 637 (ALT 250 FOR IP)

## 2024-02-06 PROCEDURE — 2580000003 HC RX 258: Performed by: STUDENT IN AN ORGANIZED HEALTH CARE EDUCATION/TRAINING PROGRAM

## 2024-02-06 PROCEDURE — 94660 CPAP INITIATION&MGMT: CPT

## 2024-02-06 RX ADMIN — INSULIN LISPRO 8 UNITS: 100 INJECTION, SOLUTION INTRAVENOUS; SUBCUTANEOUS at 09:26

## 2024-02-06 RX ADMIN — MIDODRINE HYDROCHLORIDE 2.5 MG: 5 TABLET ORAL at 12:08

## 2024-02-06 RX ADMIN — INSULIN GLARGINE 40 UNITS: 100 INJECTION, SOLUTION SUBCUTANEOUS at 09:26

## 2024-02-06 RX ADMIN — TICAGRELOR 90 MG: 90 TABLET ORAL at 09:25

## 2024-02-06 RX ADMIN — TICAGRELOR 90 MG: 90 TABLET ORAL at 20:17

## 2024-02-06 RX ADMIN — METFORMIN HYDROCHLORIDE 1000 MG: 500 TABLET ORAL at 09:24

## 2024-02-06 RX ADMIN — SODIUM CHLORIDE, PRESERVATIVE FREE 10 ML: 5 INJECTION INTRAVENOUS at 20:18

## 2024-02-06 RX ADMIN — FERROUS SULFATE TAB EC 325 MG (65 MG FE EQUIVALENT) 325 MG: 325 (65 FE) TABLET DELAYED RESPONSE at 09:25

## 2024-02-06 RX ADMIN — Medication 10 MG: at 20:18

## 2024-02-06 RX ADMIN — PREDNISONE 40 MG: 20 TABLET ORAL at 09:24

## 2024-02-06 RX ADMIN — ASPIRIN 81 MG 81 MG: 81 TABLET ORAL at 09:24

## 2024-02-06 RX ADMIN — SODIUM CHLORIDE, PRESERVATIVE FREE 10 ML: 5 INJECTION INTRAVENOUS at 09:27

## 2024-02-06 RX ADMIN — BISMUTH SUBSALICYLATE 524 MG: 262 TABLET, CHEWABLE ORAL at 20:18

## 2024-02-06 RX ADMIN — ALOGLIPTIN 12.5 MG: 12.5 TABLET, FILM COATED ORAL at 09:25

## 2024-02-06 RX ADMIN — METOPROLOL TARTRATE 12.5 MG: 50 TABLET, FILM COATED ORAL at 20:18

## 2024-02-06 RX ADMIN — DOCUSATE SODIUM 100 MG: 100 CAPSULE, LIQUID FILLED ORAL at 20:19

## 2024-02-06 RX ADMIN — PANTOPRAZOLE SODIUM 40 MG: 40 TABLET, DELAYED RELEASE ORAL at 20:21

## 2024-02-06 RX ADMIN — METFORMIN HYDROCHLORIDE 1000 MG: 500 TABLET ORAL at 16:49

## 2024-02-06 RX ADMIN — BISMUTH SUBSALICYLATE 524 MG: 262 TABLET, CHEWABLE ORAL at 16:49

## 2024-02-06 RX ADMIN — OXYCODONE HYDROCHLORIDE AND ACETAMINOPHEN 1 TABLET: 5; 325 TABLET ORAL at 09:25

## 2024-02-06 RX ADMIN — INSULIN GLARGINE 35 UNITS: 100 INJECTION, SOLUTION SUBCUTANEOUS at 20:19

## 2024-02-06 RX ADMIN — ESCITALOPRAM 20 MG: 10 TABLET, FILM COATED ORAL at 09:24

## 2024-02-06 RX ADMIN — CLOTRIMAZOLE AND BETAMETHASONE DIPROPIONATE: 10; .5 CREAM TOPICAL at 20:18

## 2024-02-06 RX ADMIN — INSULIN LISPRO 12 UNITS: 100 INJECTION, SOLUTION INTRAVENOUS; SUBCUTANEOUS at 12:08

## 2024-02-06 RX ADMIN — BISMUTH SUBSALICYLATE 524 MG: 262 TABLET, CHEWABLE ORAL at 12:08

## 2024-02-06 RX ADMIN — DOCUSATE SODIUM 100 MG: 100 CAPSULE, LIQUID FILLED ORAL at 09:25

## 2024-02-06 RX ADMIN — METOPROLOL TARTRATE 12.5 MG: 50 TABLET, FILM COATED ORAL at 09:24

## 2024-02-06 RX ADMIN — BISMUTH SUBSALICYLATE 524 MG: 262 TABLET, CHEWABLE ORAL at 09:25

## 2024-02-06 RX ADMIN — ATORVASTATIN CALCIUM 40 MG: 40 TABLET, FILM COATED ORAL at 09:25

## 2024-02-06 RX ADMIN — TOPIRAMATE 100 MG: 100 TABLET, FILM COATED ORAL at 20:17

## 2024-02-06 RX ADMIN — PANTOPRAZOLE SODIUM 40 MG: 40 TABLET, DELAYED RELEASE ORAL at 09:25

## 2024-02-06 RX ADMIN — BUDESONIDE AND FORMOTEROL FUMARATE DIHYDRATE 2 PUFF: 160; 4.5 AEROSOL RESPIRATORY (INHALATION) at 20:58

## 2024-02-06 RX ADMIN — BUDESONIDE AND FORMOTEROL FUMARATE DIHYDRATE 2 PUFF: 160; 4.5 AEROSOL RESPIRATORY (INHALATION) at 08:28

## 2024-02-06 ASSESSMENT — PAIN - FUNCTIONAL ASSESSMENT: PAIN_FUNCTIONAL_ASSESSMENT: ACTIVITIES ARE NOT PREVENTED

## 2024-02-06 ASSESSMENT — PAIN DESCRIPTION - PAIN TYPE: TYPE: CHRONIC PAIN

## 2024-02-06 ASSESSMENT — PAIN DESCRIPTION - LOCATION: LOCATION: ABDOMEN

## 2024-02-06 ASSESSMENT — PAIN DESCRIPTION - DESCRIPTORS: DESCRIPTORS: ACHING

## 2024-02-06 ASSESSMENT — PAIN DESCRIPTION - ORIENTATION: ORIENTATION: LOWER

## 2024-02-06 ASSESSMENT — PAIN SCALES - GENERAL: PAINLEVEL_OUTOF10: 8

## 2024-02-06 NOTE — PROGRESS NOTES
Physical Therapy        Physical Therapy Cancel Note      DATE: 2024    NAME: Mariangel Abreu  MRN: [id number]   : 1950      Patient not seen this date for Physical Therapy due to: Pt states she's exhausted from taking a shower and to try back tomorrow.           Electronically signed by Dylan Carmen PTA on 2024 at 10:59 AM

## 2024-02-06 NOTE — PROGRESS NOTES
Occupational Therapy  Facility/Department: [facility] CAR 2- STEPDOWN  Occupational Therapy Daily Treatment Note      Name: Mariangel Abreu  : 1950  MRN: [id number]  Date of Service: 2024    Discharge Recommendations:  Patient would benefit from continued therapy after discharge          Patient Diagnosis(es): The primary encounter diagnosis was ST elevation myocardial infarction (STEMI), unspecified artery ([facility]). Diagnoses of ST elevation myocardial infarction involving right coronary artery ([facility]) and S/P primary angioplasty with coronary stent were also pertinent to this visit.  Past Medical History:  has a past medical history of Abdominal bloating, Adenomatous polyp of ascending colon, Allergic rhinitis, Anxiety, Arthritis, Asthma, Atrial fibrillation ([facility]), Back pain, Benign hypertension with CKD (chronic kidney disease) stage III ([facility]), CAD (coronary artery disease), Caffeine use, CHF (congestive heart failure) ([facility]), Chronic kidney disease, CKD (chronic kidney disease) stage 3, GFR 30-59 ml/min ([facility]), COPD (chronic obstructive pulmonary disease) ([facility]), Degeneration of lumbar or lumbosacral intervertebral disc, Depression, DM (diabetes mellitus) ([facility]), Emphysema of lung ([facility]), Gastritis, GERD (gastroesophageal reflux disease), Hearing loss, Hematuria, Hiatal hernia, HTN (hypertension), benign, Hypertriglyceridemia, Incontinence of urine, Irritable bowel syndrome with both constipation and diarrhea, Kidney stone, Knee arthropathy, Lumbosacral spondylosis without myelopathy, Migraine headache, Mumps, Neuropathy, Obesity, On home oxygen therapy, HIGINIO on CPAP, Otitis media, Secondary diabetes mellitus with stage 3 chronic kidney disease (GFR 30-59) ([facility]), Stroke ([facility]), Tinnitus, Type 2 diabetes mellitus without complication ([facility]), Type II or unspecified type diabetes mellitus without mention of complication, not stated as uncontrolled, UTI (lower urinary tract infection), and Varicose vein.  Past Surgical  Short Term Goals: By discharge, pt will:  Short Term Goal 1: Demo Mod I for functional transfers and functional mobility with use of LRAD for engagement in ADLs/IADLs  Short Term Goal 2: Demo IND for UB ADLs  Short Term Goal 3: Demo Mod I for LB ADLs and toileting tasks with setup and AE use PRN  Short Term Goal 4: Engage in 5 min dynamic standing activity with SUP and unilateral UE release from support for improved balance during ADL/IADL participation  Short Term Goal 5: Demo good safety awareness throughout therapy session with 0 VCs       Therapy Time   Individual Concurrent Group Co-treatment   Time In 0845         Time Out 1000         Minutes 75         Timed Code Treatment Minutes: 75 Minutes       MARIANO STEVE/L

## 2024-02-06 NOTE — PROGRESS NOTES
[facility]  Office: [phone]  Goldy Moore DO, Ferdinand Villeda DO, Jose Goff DO, Alejo Mao DO, Mainor Smith MD, Melony Rodriguez MD, Tonia Brenner MD, Alethea Ramsey MD,  Rahul Zheng MD, Josi Saldana MD, Pedro Deras MD,  Lauren Acosta DO, Randee Pantoja MD, Raji Reddy MD, Praveen Moore DO, Veronica Duff MD,  Spencer Ferguson DO, Olivia Boss MD, Veronika Lin MD, Caryl Zacarias MD, Slick Jean MD,  Tacos Yang MD, Guzman Stein MD, Vicente Yee MD, Mickey Avila MD, Conrad Bailey MD, Raisa Felder MD, Travon Blanchard DO, Joel Rome DO, Mariel Francisco MD,  Mika Valentin MD, Shirley Waterhouse, CNP,  Isamar Seals CNP, Jose Warren, CNP,  Jenna Angulo, DNP, Jeannette Bobo, CNP, Sofya Rose, CNP, Zoe Coates CNP, Lara Yan CNP, Michelle García, CNP, Tamara Issa, PA-C, Halina Lynch, PA-C, Greer Harmon, CNP, Keri Weaver, CNP, Cheyanne White, CNS, Heide Collier, CNP, Amber Mast CNP, Tracy Schwab, CNP         [facility]   IN-PATIENT SERVICE   [facility]    Progress Note    2/6/2024    1:41 PM    Name:   Mariangel Abreu  MRN:     [id number]     Acct:      [id number]   Room:   2017/2017-01  IP Day:  5  Admit Date:  2/1/2024 12:34 PM    PCP:   Carla Chua APRN - CNP  Code Status:  Full Code    Subjective:     C/C: Chest Pain   Interval History Status: improved.     Patient seen and examined at baseline this morning. No acute events overnight. Denies any CP, SOB, HA, fever or chills. Is awaiting precert for placement at [facility]    Brief History:     73-year-old patient with multiple medical conditions listed below who presents to the emergency department for chest pain, Was found to have an inferior STEMI taken for emergent cardiac cath where PCI with ELEANOR to mid RCA was completed.  There is residual high-grade LAD diagonal stenosis requiring staged PCI to LAD in 4 to 6-weeks     We are  20 mg Oral QAM    budesonide-formoterol  2 puff Inhalation BID RT    atorvastatin  40 mg Oral Daily    docusate sodium  100 mg Oral BID    ferrous sulfate  325 mg Oral Daily with breakfast    metoprolol tartrate  12.5 mg Oral BID    [Held by provider] oxyBUTYnin  5 mg Oral Daily    pantoprazole  40 mg Oral BID    topiramate  100 mg Oral Nightly     Continuous Infusions:    sodium chloride 50 mL/hr at 02/03/24 0601    dextrose      dextrose       PRN Meds: midodrine, benzonatate, nitroGLYCERIN, sodium chloride flush, sodium chloride, potassium chloride **OR** potassium alternative oral replacement **OR** potassium chloride, magnesium sulfate, promethazine **OR** ondansetron, polyethylene glycol, acetaminophen **OR** acetaminophen, glucose, dextrose bolus **OR** dextrose bolus, glucagon (rDNA), dextrose, albuterol, albuterol sulfate HFA, dicyclomine, oxyCODONE-acetaminophen, glucose, dextrose bolus **OR** dextrose bolus, glucagon (rDNA), dextrose, melatonin    Data:     Past Medical History:   has a past medical history of Abdominal bloating, Adenomatous polyp of ascending colon, Allergic rhinitis, Anxiety, Arthritis, Asthma, Atrial fibrillation ([facility]), Back pain, Benign hypertension with CKD (chronic kidney disease) stage III ([facility]), CAD (coronary artery disease), Caffeine use, CHF (congestive heart failure) ([facility]), Chronic kidney disease, CKD (chronic kidney disease) stage 3, GFR 30-59 ml/min ([facility]), COPD (chronic obstructive pulmonary disease) ([facility]), Degeneration of lumbar or lumbosacral intervertebral disc, Depression, DM (diabetes mellitus) ([facility]), Emphysema of lung ([facility]), Gastritis, GERD (gastroesophageal reflux disease), Hearing loss, Hematuria, Hiatal hernia, HTN (hypertension), benign, Hypertriglyceridemia, Incontinence of urine, Irritable bowel syndrome with both constipation and diarrhea, Kidney stone, Knee arthropathy, Lumbosacral spondylosis without myelopathy, Migraine headache, Mumps, Neuropathy, Obesity, On

## 2024-02-06 NOTE — PLAN OF CARE
Problem: Chronic Conditions and Co-morbidities  Goal: Patient's chronic conditions and co-morbidity symptoms are monitored and maintained or improved  2/6/2024 0258 by Mirlande Degroot RN  Outcome: Progressing  2/5/2024 1818 by Kalia Rae RN  Outcome: Progressing  Flowsheets (Taken 2/5/2024 0800 by Holly Navarro, RN)  Care Plan - Patient's Chronic Conditions and Co-Morbidity Symptoms are Monitored and Maintained or Improved: Monitor and assess patient's chronic conditions and comorbid symptoms for stability, deterioration, or improvement     Problem: Pain  Goal: Verbalizes/displays adequate comfort level or baseline comfort level  2/6/2024 0258 by Mirlande Degroot RN  Outcome: Progressing  2/5/2024 1818 by Kalia Rae RN  Outcome: Progressing     Problem: Discharge Planning  Goal: Discharge to home or other facility with appropriate resources  2/6/2024 0258 by Mirlande Degroot RN  Outcome: Progressing  2/5/2024 1818 by Kalia Rae RN  Outcome: Progressing  Flowsheets (Taken 2/5/2024 0800 by Holly Navarro, RN)  Discharge to home or other facility with appropriate resources: Identify barriers to discharge with patient and caregiver     Problem: Skin/Tissue Integrity  Goal: Absence of new skin breakdown  Description: 1.  Monitor for areas of redness and/or skin breakdown  2.  Assess vascular access sites hourly  3.  Every 4-6 hours minimum:  Change oxygen saturation probe site  4.  Every 4-6 hours:  If on nasal continuous positive airway pressure, respiratory therapy assess nares and determine need for appliance change or resting period.  2/6/2024 0258 by Mirlande Degroot RN  Outcome: Progressing  2/5/2024 1818 by Kalia Rae RN  Outcome: Progressing     Problem: Safety - Adult  Goal: Free from fall injury  2/6/2024 0258 by Mirlande Degroot RN  Outcome: Progressing  2/5/2024 1818 by Kalia Rae RN  Outcome: Progressing     Problem: ABCDS Injury Assessment  Goal:  Absence of physical injury  2/6/2024 0258 by Mirlande Degroot RN  Outcome: Progressing  2/5/2024 1818 by Kalia Rae RN  Outcome: Progressing     Problem: Respiratory - Adult  Goal: Achieves optimal ventilation and oxygenation  2/6/2024 0258 by Mirlande Degroot RN  Outcome: Progressing  2/5/2024 2059 by Nkechi Easton RCP  Flowsheets  Taken 2/5/2024 2059 by Nkechi Easton RCP  Achieves optimal ventilation and oxygenation:   Respiratory therapy support as indicated   Assess for changes in respiratory status   Assess for changes in mentation and behavior   Oxygen supplementation based on oxygen saturation or arterial blood gases   Encourage broncho-pulmonary hygiene including cough, deep breathe, incentive spirometry   Assess and instruct to report shortness of breath or any respiratory difficulty  Taken 2/5/2024 0800 by Holly Navarro, RN  Achieves optimal ventilation and oxygenation: Assess for changes in respiratory status

## 2024-02-06 NOTE — PLAN OF CARE
Problem: Respiratory - Adult  Goal: Achieves optimal ventilation and oxygenation  Flowsheets  Taken 2/5/2024 2059 by Nkechi Easton RCP  Achieves optimal ventilation and oxygenation:   Respiratory therapy support as indicated   Assess for changes in respiratory status   Assess for changes in mentation and behavior   Oxygen supplementation based on oxygen saturation or arterial blood gases   Encourage broncho-pulmonary hygiene including cough, deep breathe, incentive spirometry   Assess and instruct to report shortness of breath or any respiratory difficulty

## 2024-02-06 NOTE — PLAN OF CARE
Problem: Respiratory - Adult  Goal: Achieves optimal ventilation and oxygenation  2/6/2024 0831 by Shanita Santoro, PETAR  Outcome: Progressing   BRONCHOSPASM/BRONCHOCONSTRICTION     [x]         IMPROVE AERATION/BREATH SOUNDS  [x]   ADMINISTER BRONCHODILATOR THERAPY AS APPROPRIATE  [x]   ASSESS BREATH SOUNDS  []   IMPLEMENT AEROSOL/MDI PROTOCOL  [x]   PATIENT EDUCATION AS NEEDED

## 2024-02-06 NOTE — PROGRESS NOTES
[facility]   Progress Note                   Date:   2/6/2024  Patient name: Mariangel Abreu  Date of admission:  2/1/2024 12:34 PM  MRN:   [id number]  YOB: 1950  PCP: Carla Chua, VERONIKA - CNP    Reason for Admission: Inferior STEMI.    Subjective:   Patient seen and examined. Denies chest pain or increased shortness of breath. Tele/vitals/labs reviewed . Precert pending for [place]     Medications:   Scheduled Meds:   predniSONE  40 mg Oral Daily    metFORMIN  1,000 mg Oral BID WC    alogliptin  12.5 mg Oral Daily    insulin glargine  35 Units SubCUTAneous Nightly    insulin glargine  40 Units SubCUTAneous Daily    midodrine  2.5 mg Oral TID WC    clotrimazole-betamethasone   Topical BID    bismuth subsalicylate  524 mg Oral 4x Daily AC & HS    insulin lispro  0-16 Units SubCUTAneous TID WC    insulin lispro  0-4 Units SubCUTAneous Nightly    sodium chloride flush  10 mL IntraVENous 2 times per day    aspirin  81 mg Oral Daily    ticagrelor  90 mg Oral BID    escitalopram  20 mg Oral QAM    budesonide-formoterol  2 puff Inhalation BID RT    atorvastatin  40 mg Oral Daily    docusate sodium  100 mg Oral BID    ferrous sulfate  325 mg Oral Daily with breakfast    metoprolol tartrate  12.5 mg Oral BID    [Held by provider] oxyBUTYnin  5 mg Oral Daily    pantoprazole  40 mg Oral BID    topiramate  100 mg Oral Nightly     Continuous Infusions:   sodium chloride 50 mL/hr at 02/03/24 0601    dextrose      dextrose       CBC:   Recent Labs     02/03/24  1047   WBC 7.4   HGB 13.2   PLT See Reflexed IPF Result       BMP:    Recent Labs     02/03/24  1047 02/04/24  0601   * 133*   K 4.1 3.8   CL 99 100   CO2 24 28   BUN 28* 19   CREATININE 1.0* 0.8   GLUCOSE 231* 127*       Hepatic:   Recent Labs     02/03/24  1047   AST 46*   ALT 23   BILITOT 0.2*   ALKPHOS 61       Troponin: No results for input(s): \"TROPONINI\" in the last 72 hours.  BNP: No results for input(s): \"BNP\" in

## 2024-02-07 ENCOUNTER — CARE COORDINATION (OUTPATIENT)
Dept: CARE COORDINATION | Age: 74
End: 2024-02-07

## 2024-02-07 LAB
ANION GAP SERPL CALCULATED.3IONS-SCNC: 11 MMOL/L (ref 9–17)
BUN SERPL-MCNC: 36 MG/DL (ref 8–23)
CALCIUM SERPL-MCNC: 9 MG/DL (ref 8.6–10.4)
CHLORIDE SERPL-SCNC: 98 MMOL/L (ref 98–107)
CO2 SERPL-SCNC: 26 MMOL/L (ref 20–31)
CREAT SERPL-MCNC: 0.9 MG/DL (ref 0.5–0.9)
GFR SERPL CREATININE-BSD FRML MDRD: >60 ML/MIN/1.73M2
GLUCOSE BLD-MCNC: 172 MG/DL (ref 65–105)
GLUCOSE BLD-MCNC: 178 MG/DL (ref 65–105)
GLUCOSE BLD-MCNC: 206 MG/DL (ref 65–105)
GLUCOSE BLD-MCNC: 215 MG/DL (ref 65–105)
GLUCOSE SERPL-MCNC: 185 MG/DL (ref 70–99)
POTASSIUM SERPL-SCNC: 4.4 MMOL/L (ref 3.7–5.3)
SODIUM SERPL-SCNC: 135 MMOL/L (ref 135–144)

## 2024-02-07 PROCEDURE — 99232 SBSQ HOSP IP/OBS MODERATE 35: CPT | Performed by: STUDENT IN AN ORGANIZED HEALTH CARE EDUCATION/TRAINING PROGRAM

## 2024-02-07 PROCEDURE — 82947 ASSAY GLUCOSE BLOOD QUANT: CPT

## 2024-02-07 PROCEDURE — 2580000003 HC RX 258: Performed by: STUDENT IN AN ORGANIZED HEALTH CARE EDUCATION/TRAINING PROGRAM

## 2024-02-07 PROCEDURE — 6370000000 HC RX 637 (ALT 250 FOR IP): Performed by: STUDENT IN AN ORGANIZED HEALTH CARE EDUCATION/TRAINING PROGRAM

## 2024-02-07 PROCEDURE — 94640 AIRWAY INHALATION TREATMENT: CPT

## 2024-02-07 PROCEDURE — 94761 N-INVAS EAR/PLS OXIMETRY MLT: CPT

## 2024-02-07 PROCEDURE — 6370000000 HC RX 637 (ALT 250 FOR IP): Performed by: NURSE PRACTITIONER

## 2024-02-07 PROCEDURE — 97116 GAIT TRAINING THERAPY: CPT

## 2024-02-07 PROCEDURE — 6370000000 HC RX 637 (ALT 250 FOR IP): Performed by: INTERNAL MEDICINE

## 2024-02-07 PROCEDURE — 94660 CPAP INITIATION&MGMT: CPT

## 2024-02-07 PROCEDURE — 6360000002 HC RX W HCPCS: Performed by: NURSE PRACTITIONER

## 2024-02-07 PROCEDURE — 2060000000 HC ICU INTERMEDIATE R&B

## 2024-02-07 PROCEDURE — 80048 BASIC METABOLIC PNL TOTAL CA: CPT

## 2024-02-07 PROCEDURE — 36415 COLL VENOUS BLD VENIPUNCTURE: CPT

## 2024-02-07 PROCEDURE — 6370000000 HC RX 637 (ALT 250 FOR IP)

## 2024-02-07 PROCEDURE — 2700000000 HC OXYGEN THERAPY PER DAY

## 2024-02-07 PROCEDURE — 97110 THERAPEUTIC EXERCISES: CPT

## 2024-02-07 RX ORDER — INSULIN GLARGINE 100 [IU]/ML
43 INJECTION, SOLUTION SUBCUTANEOUS DAILY
Status: DISCONTINUED | OUTPATIENT
Start: 2024-02-07 | End: 2024-02-08 | Stop reason: HOSPADM

## 2024-02-07 RX ORDER — MIDODRINE HYDROCHLORIDE 2.5 MG/1
2.5 TABLET ORAL
Qty: 90 TABLET | Refills: 3 | Status: SHIPPED | OUTPATIENT
Start: 2024-02-07

## 2024-02-07 RX ORDER — INSULIN GLARGINE 100 [IU]/ML
43 INJECTION, SOLUTION SUBCUTANEOUS DAILY
Qty: 10 ML | Refills: 3 | Status: SHIPPED | OUTPATIENT
Start: 2024-02-08

## 2024-02-07 RX ORDER — NITROGLYCERIN 0.4 MG/1
0.4 TABLET SUBLINGUAL EVERY 5 MIN PRN
Qty: 25 TABLET | Refills: 3 | Status: SHIPPED | OUTPATIENT
Start: 2024-02-07

## 2024-02-07 RX ORDER — CHOLECALCIFEROL (VITAMIN D3) 125 MCG
10 CAPSULE ORAL NIGHTLY PRN
Qty: 30 TABLET | Refills: 0 | Status: SHIPPED | OUTPATIENT
Start: 2024-02-07

## 2024-02-07 RX ORDER — ALOGLIPTIN 12.5 MG/1
12.5 TABLET, FILM COATED ORAL DAILY
Qty: 30 TABLET | Refills: 6 | Status: SHIPPED | OUTPATIENT
Start: 2024-02-08

## 2024-02-07 RX ORDER — INSULIN GLARGINE 100 [IU]/ML
35 INJECTION, SOLUTION SUBCUTANEOUS NIGHTLY
Qty: 10 ML | Refills: 3 | Status: SHIPPED | OUTPATIENT
Start: 2024-02-07

## 2024-02-07 RX ORDER — BENZONATATE 200 MG/1
200 CAPSULE ORAL 3 TIMES DAILY PRN
Qty: 90 CAPSULE | Refills: 1 | Status: SHIPPED | OUTPATIENT
Start: 2024-02-07 | End: 2024-04-07

## 2024-02-07 RX ADMIN — TICAGRELOR 90 MG: 90 TABLET ORAL at 20:57

## 2024-02-07 RX ADMIN — INSULIN LISPRO 4 UNITS: 100 INJECTION, SOLUTION INTRAVENOUS; SUBCUTANEOUS at 08:06

## 2024-02-07 RX ADMIN — DOCUSATE SODIUM 100 MG: 100 CAPSULE, LIQUID FILLED ORAL at 08:06

## 2024-02-07 RX ADMIN — ESCITALOPRAM 20 MG: 10 TABLET, FILM COATED ORAL at 08:07

## 2024-02-07 RX ADMIN — MIDODRINE HYDROCHLORIDE 2.5 MG: 5 TABLET ORAL at 11:03

## 2024-02-07 RX ADMIN — TOPIRAMATE 100 MG: 100 TABLET, FILM COATED ORAL at 20:57

## 2024-02-07 RX ADMIN — Medication 10 MG: at 21:06

## 2024-02-07 RX ADMIN — METFORMIN HYDROCHLORIDE 1000 MG: 500 TABLET ORAL at 16:51

## 2024-02-07 RX ADMIN — CLOTRIMAZOLE AND BETAMETHASONE DIPROPIONATE: 10; .5 CREAM TOPICAL at 20:59

## 2024-02-07 RX ADMIN — ALOGLIPTIN 12.5 MG: 12.5 TABLET, FILM COATED ORAL at 08:08

## 2024-02-07 RX ADMIN — CLOTRIMAZOLE AND BETAMETHASONE DIPROPIONATE: 10; .5 CREAM TOPICAL at 08:09

## 2024-02-07 RX ADMIN — DOCUSATE SODIUM 100 MG: 100 CAPSULE, LIQUID FILLED ORAL at 20:57

## 2024-02-07 RX ADMIN — BISMUTH SUBSALICYLATE 524 MG: 262 TABLET, CHEWABLE ORAL at 20:58

## 2024-02-07 RX ADMIN — BISMUTH SUBSALICYLATE 524 MG: 262 TABLET, CHEWABLE ORAL at 11:02

## 2024-02-07 RX ADMIN — BUDESONIDE AND FORMOTEROL FUMARATE DIHYDRATE 2 PUFF: 160; 4.5 AEROSOL RESPIRATORY (INHALATION) at 08:21

## 2024-02-07 RX ADMIN — TICAGRELOR 90 MG: 90 TABLET ORAL at 08:07

## 2024-02-07 RX ADMIN — SODIUM CHLORIDE, PRESERVATIVE FREE 10 ML: 5 INJECTION INTRAVENOUS at 08:09

## 2024-02-07 RX ADMIN — ATORVASTATIN CALCIUM 40 MG: 40 TABLET, FILM COATED ORAL at 08:06

## 2024-02-07 RX ADMIN — BISMUTH SUBSALICYLATE 524 MG: 262 TABLET, CHEWABLE ORAL at 16:51

## 2024-02-07 RX ADMIN — PANTOPRAZOLE SODIUM 40 MG: 40 TABLET, DELAYED RELEASE ORAL at 20:57

## 2024-02-07 RX ADMIN — ACETAMINOPHEN 325MG 650 MG: 325 TABLET ORAL at 15:23

## 2024-02-07 RX ADMIN — INSULIN GLARGINE 43 UNITS: 100 INJECTION, SOLUTION SUBCUTANEOUS at 08:06

## 2024-02-07 RX ADMIN — METFORMIN HYDROCHLORIDE 1000 MG: 500 TABLET ORAL at 08:07

## 2024-02-07 RX ADMIN — BUDESONIDE AND FORMOTEROL FUMARATE DIHYDRATE 2 PUFF: 160; 4.5 AEROSOL RESPIRATORY (INHALATION) at 20:26

## 2024-02-07 RX ADMIN — ASPIRIN 81 MG 81 MG: 81 TABLET ORAL at 08:06

## 2024-02-07 RX ADMIN — PANTOPRAZOLE SODIUM 40 MG: 40 TABLET, DELAYED RELEASE ORAL at 08:06

## 2024-02-07 RX ADMIN — MIDODRINE HYDROCHLORIDE 2.5 MG: 5 TABLET ORAL at 15:23

## 2024-02-07 RX ADMIN — SODIUM CHLORIDE, PRESERVATIVE FREE 10 ML: 5 INJECTION INTRAVENOUS at 20:58

## 2024-02-07 RX ADMIN — INSULIN GLARGINE 35 UNITS: 100 INJECTION, SOLUTION SUBCUTANEOUS at 20:58

## 2024-02-07 RX ADMIN — METOPROLOL TARTRATE 12.5 MG: 50 TABLET, FILM COATED ORAL at 08:06

## 2024-02-07 RX ADMIN — FERROUS SULFATE TAB EC 325 MG (65 MG FE EQUIVALENT) 325 MG: 325 (65 FE) TABLET DELAYED RESPONSE at 08:08

## 2024-02-07 RX ADMIN — METOPROLOL TARTRATE 12.5 MG: 50 TABLET, FILM COATED ORAL at 20:58

## 2024-02-07 RX ADMIN — PREDNISONE 40 MG: 20 TABLET ORAL at 08:06

## 2024-02-07 RX ADMIN — MIDODRINE HYDROCHLORIDE 2.5 MG: 5 TABLET ORAL at 08:06

## 2024-02-07 RX ADMIN — BISMUTH SUBSALICYLATE 524 MG: 262 TABLET, CHEWABLE ORAL at 08:08

## 2024-02-07 RX ADMIN — ALBUTEROL SULFATE 2.5 MG: 2.5 SOLUTION RESPIRATORY (INHALATION) at 18:18

## 2024-02-07 ASSESSMENT — PAIN DESCRIPTION - LOCATION: LOCATION: HEAD

## 2024-02-07 ASSESSMENT — PAIN SCALES - GENERAL: PAINLEVEL_OUTOF10: 4

## 2024-02-07 NOTE — PROGRESS NOTES
Physical Therapy  Facility/Department: [facility]- [facility]  Physical Therapy Daily Treatment Note    Name: Mariangel Abreu  : 1950  MRN: [id number]  Date of Service: 2024    Discharge Recommendations:  Patient would benefit from continued therapy after discharge   PT Equipment Recommendations  Equipment Needed: No      Patient Diagnosis(es): The primary encounter diagnosis was ST elevation myocardial infarction (STEMI), unspecified artery ([facility]). Diagnoses of ST elevation myocardial infarction involving right coronary artery ([facility]) and S/P primary angioplasty with coronary stent were also pertinent to this visit.  Past Medical History:  has a past medical history of Abdominal bloating, Adenomatous polyp of ascending colon, Allergic rhinitis, Anxiety, Arthritis, Asthma, Atrial fibrillation ([facility]), Back pain, Benign hypertension with CKD (chronic kidney disease) stage III ([facility]), CAD (coronary artery disease), Caffeine use, CHF (congestive heart failure) ([facility]), Chronic kidney disease, CKD (chronic kidney disease) stage 3, GFR 30-59 ml/min ([facility]), COPD (chronic obstructive pulmonary disease) ([facility]), Degeneration of lumbar or lumbosacral intervertebral disc, Depression, DM (diabetes mellitus) ([facility]), Emphysema of lung ([facility]), Gastritis, GERD (gastroesophageal reflux disease), Hearing loss, Hematuria, Hiatal hernia, HTN (hypertension), benign, Hypertriglyceridemia, Incontinence of urine, Irritable bowel syndrome with both constipation and diarrhea, Kidney stone, Knee arthropathy, Lumbosacral spondylosis without myelopathy, Migraine headache, Mumps, Neuropathy, Obesity, On home oxygen therapy, HIGINIO on CPAP, Otitis media, Secondary diabetes mellitus with stage 3 chronic kidney disease (GFR 30-59) ([facility]), Stroke ([facility]), Tinnitus, Type 2 diabetes mellitus without complication ([facility]), Type II or unspecified type diabetes mellitus without mention of complication, not stated as uncontrolled, UTI (lower urinary tract  Initially in Place: No     Restrictions  Restrictions/Precautions  Restrictions/Precautions: Cardiac, Fall Risk, Up as Tolerated  Required Braces or Orthoses?: No  Position Activity Restriction  Other position/activity restrictions: s/p PCI with ELEANOR to mid RCA  02/01/2024     Subjective   General  Chart Reviewed: Yes  Response To Previous Treatment: Patient with no complaints from previous session.  Family / Caregiver Present: No  Follows Commands: Within Functional Limits  General Comment  Comments: Pt retired to chair after session completion and was positioned for comfort with call light within reach.  Subjective  Subjective: Pt and RN ageeable to therapy this day. Reported pain as 5/10 in back area, RN aware. Pt pleasant and cooperative this session          Cognition   Orientation  Overall Orientation Status: Within Normal Limits  Orientation Level: Oriented X4  Cognition  Overall Cognitive Status: WFL     Objective  Bed mobility  Supine to Sit: Stand by assistance  Sit to Supine: Stand by assistance  Scooting: Stand by assistance  Transfers  Sit to Stand: Contact guard assistance  Stand to Sit: Contact guard assistance  Ambulation  Surface: Level tile  Device: Rolling Walker  Other Apparatus: O2 (3Lm of O2 via NC.)  Assistance: Contact guard assistance  Quality of Gait: fwd flexed posture  Gait Deviations: Slow Hetal;Decreased step length;Decreased step height  Distance: 100 ft.  More Ambulation?: No  Stairs/Curb  Stairs?: No     Balance  Posture: Fair  Sitting - Static: Good  Sitting - Dynamic: Good;-  Standing - Static: Fair  Standing - Dynamic: Fair  Comments: sitting balance assessed at the EOC, standing balance assessed while using a RW.  Exercise Treatment: IS x10  A/AROM Exercises: Ankle pumps x20 BLE, LAQ x20 BLE, KTC x20 BLE, Elbow flexion x20 BLE.      AM-PAC - Mobility  AM-PAC Basic Mobility - Inpatient   How much help is needed turning from your back to your side while in a flat bed without using  bedrails?: None  How much help is needed moving from lying on your back to sitting on the side of a flat bed without using bedrails?: A Little  How much help is needed moving to and from a bed to a chair?: A Little  How much help is needed standing up from a chair using your arms?: A Little  How much help is needed walking in hospital room?: A Little  How much help is needed climbing 3-5 steps with a railing?: A Lot  AM-PAC Inpatient Mobility Raw Score : 18  AM-PAC Inpatient T-Scale Score : 43.63  Mobility Inpatient CMS 0-100% Score: 46.58  Mobility Inpatient CMS G-Code Modifier : CK      Goals  Short Term Goals  Time Frame for Short Term Goals: 14 visits  Short Term Goal 1: Pt will perform sit<>stand transfer with supervision.  Short Term Goal 2: Pt will ambulate 100 feet with least restrictive AD and SBA.  Short Term Goal 3: Pt will demonstrate good- dynamic standing balance to decrease fall risk.  Short Term Goal 4: Pt will tolerate a 35 minute therapy session to promote increased endurance.  Short Term Goal 5: Pt will perform supine<>sit transfer with supervision.       Education  Patient Education  Education Given To: Patient  Education Provided: Plan of Care;Role of Therapy;Transfer Training  Education Provided Comments: safety and mobility  Education Method: Verbal  Barriers to Learning: None  Education Outcome: Verbalized understanding;Demonstrated understanding      Therapy Time   Individual Concurrent Group Co-treatment   Time In 1100         Time Out 1134         Minutes 34         Timed Code Treatment Minutes: 23 Minutes       Reji Wei PTA

## 2024-02-07 NOTE — PROGRESS NOTES
[facility]  Office: [phone]  Goldy Moore DO, Ferdinand Villeda DO, Jose Goff DO, Alejo Mao DO, Mainor Smith MD, Melony Rodriguez MD, Tonia Brenner MD, Alethea Ramsey MD,  Rahul Zheng MD, Josi Saldana MD, Pedro Deras MD,  Lauren Acosta DO, Randee Pantoja MD, Raji Reddy MD, Praveen Moore DO, Veronica Duff MD,  Spencer Ferguson DO, Olivia Boss MD, Veronika Lin MD, Caryl Zacarias MD, Slick Jean MD,  Tacos Yang MD, Guzman Stein MD, Vicente Yee MD, Mickey Avila MD, Conrad Bailey MD, Raisa Felder MD, Travon Blanchard DO, Joel Rome DO, Mariel Francisco MD,  Mika Valentin MD, Shirley Waterhouse, CNP,  Isamar Seals CNP, Jose Warren, CNP,  Jenna Angulo, NAKUL, Jeannette Bobo, CNP, Sofya Rose, CNP, Zoe Coates CNP, Lara Yan CNP, Michelle García CNP, Tamara Issa, PA-C, Halina Lynch PA-C, Greer Harmon, CNP, Keri Weaver, CNP, Cheyanne White, CNS, Heide Collier, CNP, Amber Mast CNP, Tracy Schwab, CNP         [facility]   IN-PATIENT SERVICE   [facility]    Progress Note    2/7/2024    11:35 AM    Name:   Mariangel Abreu  MRN:     [id number]     Acct:      [id number]   Room:   2017/2017-01  IP Day:  6  Admit Date:  2/1/2024 12:34 PM    PCP:   Carla Chua APRN - CNP  Code Status:  Full Code    Subjective:     C/C: Chest Pain   Interval History Status: improved.     Patient seen and examined at bedside this morning. No acute events overnight. Has some congestion. Is allergic to mucinex. States she is coughing up yellowish phlegm easily. Denies any CP, worsening SOB, HA, fever or chills. Patient continues to await placement at [facility]    Brief History:     73-year-old patient with multiple medical conditions listed below who presents to the emergency department for chest pain, Was found to have an inferior STEMI taken for emergent cardiac cath where PCI with ELEANOR to mid RCA was  lesions, rashes, induration    Assessment:        Hospital Problems             Last Modified POA    * (Principal) STEMI (ST elevation myocardial infarction) ([facility]) 2/1/2024 Yes    COPD (chronic obstructive pulmonary disease) 2/1/2024 Yes    Chronic diastolic congestive heart failure (HCC) 2/1/2024 Yes    Chronic use of opiate for therapeutic purpose 2/1/2024 Yes    DDD (degenerative disc disease), lumbar 2/1/2024 Yes    DDD (degenerative disc disease), cervical (Chronic) 2/1/2024 Yes    Essential hypertension 2/1/2024 Yes    Mixed hyperlipidemia (Chronic) 2/1/2024 Yes    Overview Signed 9/7/2015  4:50 AM by Ambulatory, Admin     replace inactive diagnosis         Lung nodule 2/1/2024 Yes    Asthma with COPD 2/1/2024 Yes    Stage 3 chronic kidney disease ([facility]) 2/1/2024 Yes    Morbid obesity with BMI of 40.0-44.9, adult ([facility]) 2/1/2024 Yes    Opioid dependence with current use ([facility]) 2/1/2024 Yes    Uncontrolled type 2 diabetes mellitus with hyperglycemia ([facility]) 2/1/2024 Yes     Plan:        Inferior STEMI s/p ELEANOR to mid RCA  Residual stenosis 80% LAD and D1  Follow up outpatient   Continue ASA, statin, Brilinta, BB  Cardiology primary    DM2- uncontrolled  HbA1C- 14.7, pt not taking Trulicity at home  Lantus 42 units daily, con't lantus 35 units qhs,   Continue Metformin 1000 mg BID and Nesina,   SSI    COPD  CXR negative   Prednisone 40mg daily x 3 days for wheezing  Hold further steroids at this time   Continue to monitor     CKD 3  Monitor Bun /Cr, avoid nephrotoxins    RML lung nodule- f/u outpatient      Raisa Felder MD  2/7/2024  11:35 AM

## 2024-02-07 NOTE — DISCHARGE INSTR - COC
Continuity of Care Form    Patient Name: Mariangel Abreu   :  1950  MRN:  [id number]    Admit date:  2024  Discharge date:  ***    Code Status Order: Full Code   Advance Directives:     Admitting Physician:  Victoriano Dee MD  PCP: Carla Chua, VERONIKA Dalton CNP    Discharging Nurse: ***  Discharging Hospital Unit/Room#:   Discharging Unit Phone Number: ***    Emergency Contact:   Extended Emergency Contact Information  Primary Emergency Contact: Aden Kirkland *HIPAA  Address: [facility]  Home Phone: [phone]  Work Phone: [phone]  Mobile Phone: [phone]  Relation: Child  Secondary Emergency Contact: David Waller  Mobile Phone: [phone]  Relation: Other Relative   needed? No    Past Surgical History:  Past Surgical History:   Procedure Laterality Date    ABLATION OF DYSRHYTHMIC FOCUS      CARDIAC PROCEDURE N/A 2024    klarissa / Left heart cath / coronary angiography / stemi er 22 performed by Victoriano Dee MD at [facility] CARDIAC CATH LAB    CARPAL TUNNEL RELEASE Right     CATARACT REMOVAL WITH IMPLANT Bilateral     CHOLECYSTECTOMY      COLONOSCOPY      COLONOSCOPY  04/10/2017    tubular adenomo polyp , mod. sigmoid diverticulosis, min. int. hemorrhoids    COLONOSCOPY N/A 3/2/2021    COLONOSCOPY WITH BIOPSY performed by Moris Brenner MD at [facility] ENDO    CYSTOSCOPY  2013    DILATION AND CURETTAGE  1979    EYE SURGERY      laser    INCONTINENCE SURGERY      Percutaneous nerve stimulation Dr Augie Amaya 2013     INSERTABLE CARDIAC MONITOR  2015    [employer] REVEAL LINQ MODEL #[id number] MRI CONDTIONAL OK 3T. IMMEDIATELY POST IMPLANT    OTHER SURGICAL HISTORY  09/10/15    removal of interstim    NM COLSC FLX W/REMOVAL LESION BY HOT BX FORCEPS N/A 4/10/2017    COLONOSCOPY POLYPECTOMY HOT BIOPSY performed by Ksenia Marroquin MD at [facility] OR    TONSILLECTOMY      TUBAL LIGATION      TYMPANOPLASTY      TYMPANOPLASTY      TYMPANOSTOMY TUBE  PLACEMENT  08/21/2017    UPPER GASTROINTESTINAL ENDOSCOPY  03/2016    Dr Freeman    UPPER GASTROINTESTINAL ENDOSCOPY N/A 3/2/2021    EGD BIOPSY performed by Moris Brenner MD at [facility]       Immunization History:   Immunization History   Administered Date(s) Administered    COVID-19, MODERNA BLUE border, Primary or Immunocompromised, (age 12y+), IM, 100 mcg/0.5mL 02/10/2021, 03/10/2021, 11/03/2021, 05/20/2022    COVID-19, MODERNA Bivalent, (age 12y+), IM, 50 mcg/0.5 mL 10/28/2022    COVID-19, [place] Vaccine, Vaccine Unspecified 02/10/2021, 03/10/2021, 11/03/2021, 05/20/2022    Influenza 10/18/2012, 10/07/2013    Influenza Virus Vaccine 10/02/2014, 10/20/2015    Influenza Whole 09/01/2010    Influenza, AFLURIA (age 3 yrs+), FLUZONE, (age 6 mo+), MDV, 0.5mL 10/04/2022    Influenza, FLUAD, (age 65 y+), Adjuvanted, 0.5mL 09/14/2020, 09/26/2023    Influenza, FLUARIX, FLULAVAL, FLUZONE (age 6 mo+) AND AFLURIA, (age 3 y+), PF, 0.5mL 11/02/2019, 10/08/2021    Influenza, High Dose (Fluzone 65 yrs and older) 11/10/2016, 10/19/2017, 10/08/2018    Pneumococcal, PCV-13, PREVNAR 13, (age 6w+), IM, 0.5mL 06/15/2016    Pneumococcal, PPSV23, PNEUMOVAX 23, (age 2y+), SC/IM, 0.5mL 01/01/2009, 09/11/2017    Zoster Recombinant (Shingrix) 05/20/2022, 10/28/2022       Active Problems:  Patient Active Problem List   Diagnosis Code    Chronic pain of left knee M25.562, G89.29    Type 2 diabetes mellitus with diabetic polyneuropathy, with long-term current use of insulin (HCC) E11.42, Z79.4    COPD (chronic obstructive pulmonary disease) J44.9    Nonintractable migraine, unspecified migraine type G43.009    Coronary artery disease due to lipid rich plaque I25.10, I25.83    DDD (degenerative disc disease), lumbar M51.36    DDD (degenerative disc disease), cervical M50.30    Osteoarthritis of cervical spine M47.812    GERD (gastroesophageal reflux disease) K21.9    Essential hypertension I10    Mixed hyperlipidemia E78.2    Insomnia G47.00

## 2024-02-07 NOTE — PLAN OF CARE
Problem: Chronic Conditions and Co-morbidities  Goal: Patient's chronic conditions and co-morbidity symptoms are monitored and maintained or improved  Outcome: Progressing     Problem: Pain  Goal: Verbalizes/displays adequate comfort level or baseline comfort level  Outcome: Progressing     Problem: Discharge Planning  Goal: Discharge to home or other facility with appropriate resources  Outcome: Progressing     Problem: Skin/Tissue Integrity  Goal: Absence of new skin breakdown  Description: 1.  Monitor for areas of redness and/or skin breakdown  2.  Assess vascular access sites hourly  3.  Every 4-6 hours minimum:  Change oxygen saturation probe site  4.  Every 4-6 hours:  If on nasal continuous positive airway pressure, respiratory therapy assess nares and determine need for appliance change or resting period.  Outcome: Progressing     Problem: Safety - Adult  Goal: Free from fall injury  Outcome: Progressing     Problem: ABCDS Injury Assessment  Goal: Absence of physical injury  Outcome: Progressing     Problem: Respiratory - Adult  Goal: Achieves optimal ventilation and oxygenation  2/7/2024 0611 by Amanda Richardson RN  Outcome: Progressing  2/7/2024 0533 by Nkechi Easton RCP  Flowsheets (Taken 2/7/2024 0533)  Achieves optimal ventilation and oxygenation:   Respiratory therapy support as indicated   Assess for changes in respiratory status   Assess and instruct to report shortness of breath or any respiratory difficulty   Encourage broncho-pulmonary hygiene including cough, deep breathe, incentive spirometry   Oxygen supplementation based on oxygen saturation or arterial blood gases   Assess for changes in mentation and behavior

## 2024-02-07 NOTE — PLAN OF CARE
Problem: Respiratory - Adult  Goal: Achieves optimal ventilation and oxygenation  2/7/2024 1140 by Anaid Hare RCP  Outcome: Progressing

## 2024-02-07 NOTE — CARE COORDINATION
Epic in basket shows that the patient is still in  hospital. Expected discharge in 2 day but will be going to [facility] for rehab. RPM is currently pre activated  Message sent to BRANDY Rose and DEDE Brennan on the current situation  Will F/U in about 1 week with [facility]

## 2024-02-07 NOTE — PROGRESS NOTES
Physical Therapy        Physical Therapy Cancel Note      DATE: 2024    NAME: Mariangel Abreu  MRN: [id number]   : 1950      Patient not seen this date for Physical Therapy due to:    Patient Declined: Pt refusing to participate in PT at this time due to c/o increased fatigue, will attempt to check back as able.       Electronically signed by Reji Wei PTA on 2024 at 8:57 AM

## 2024-02-07 NOTE — PLAN OF CARE
Problem: Respiratory - Adult  Goal: Achieves optimal ventilation and oxygenation  Flowsheets (Taken 2/7/2024 5442)  Achieves optimal ventilation and oxygenation:   Respiratory therapy support as indicated   Assess for changes in respiratory status   Assess and instruct to report shortness of breath or any respiratory difficulty   Encourage broncho-pulmonary hygiene including cough, deep breathe, incentive spirometry   Oxygen supplementation based on oxygen saturation or arterial blood gases   Assess for changes in mentation and behavior   NON INVASIVE VENTILATION  PROVIDE OPTIMAL VENTILATION/ACCEPTABLE SP02  IMPLEMENT NON INVASIVE VENTILATION PROTOCOL  ASSESSMENT SKIN INTEGRITY  PATIENT EDUCATION AS NEEDED  BIPAP AS NEEDED

## 2024-02-07 NOTE — PLAN OF CARE
Problem: Chronic Conditions and Co-morbidities  Goal: Patient's chronic conditions and co-morbidity symptoms are monitored and maintained or improved  2/7/2024 1608 by Yaz Hre RN  Outcome: Progressing  2/7/2024 0611 by Amanda Richardson RN  Outcome: Progressing     Problem: Pain  Goal: Verbalizes/displays adequate comfort level or baseline comfort level  2/7/2024 1608 by Yaz Her RN  Outcome: Progressing  2/7/2024 0611 by Amanda Richardson RN  Outcome: Progressing     Problem: Discharge Planning  Goal: Discharge to home or other facility with appropriate resources  2/7/2024 1608 by Yaz Her RN  Outcome: Progressing  2/7/2024 0611 by Amanda Richardson RN  Outcome: Progressing     Problem: Skin/Tissue Integrity  Goal: Absence of new skin breakdown  Description: 1.  Monitor for areas of redness and/or skin breakdown  2.  Assess vascular access sites hourly  3.  Every 4-6 hours minimum:  Change oxygen saturation probe site  4.  Every 4-6 hours:  If on nasal continuous positive airway pressure, respiratory therapy assess nares and determine need for appliance change or resting period.  2/7/2024 1608 by Yaz eHr RN  Outcome: Progressing  2/7/2024 0611 by Amanda Richardson RN  Outcome: Progressing     Problem: Safety - Adult  Goal: Free from fall injury  2/7/2024 1608 by Yaz Hre RN  Outcome: Progressing  2/7/2024 0611 by Amanda Richardson RN  Outcome: Progressing     Problem: ABCDS Injury Assessment  Goal: Absence of physical injury  2/7/2024 1608 by Yaz Her RN  Outcome: Progressing  2/7/2024 0611 by Amanda Richardson RN  Outcome: Progressing     Problem: Respiratory - Adult  Goal: Achieves optimal ventilation and oxygenation  2/7/2024 1608 by Yaz Her RN  Outcome: Progressing  2/7/2024 1140 by Anaid Hare RCP  Outcome: Progressing  2/7/2024 0611 by Amanda Richardson RN  Outcome: Progressing  2/7/2024 0533 by Nkechi Easton RCP  Flowsheets (Taken 2/7/2024  0533)  Achieves optimal ventilation and oxygenation:   Respiratory therapy support as indicated   Assess for changes in respiratory status   Assess and instruct to report shortness of breath or any respiratory difficulty   Encourage broncho-pulmonary hygiene including cough, deep breathe, incentive spirometry   Oxygen supplementation based on oxygen saturation or arterial blood gases   Assess for changes in mentation and behavior

## 2024-02-07 NOTE — PROGRESS NOTES
[facility]   Progress Note                   Date:   2/7/2024  Patient name: Mariangel Abreu  Date of admission:  2/1/2024 12:34 PM  MRN:   [id number]  YOB: 1950  PCP: Carla Chua, VERONIKA - CNP    Reason for Admission: Inferior STEMI.    Subjective:   Patient seen and examined. Denies chest pain orpressure.Does have some more shortness of breath and wheezing today. States she feel \"tight\". Tele/vitals/labs reviewed . Precert pending for [facility]     Medications:   Scheduled Meds:   insulin glargine  43 Units SubCUTAneous Daily    metFORMIN  1,000 mg Oral BID WC    alogliptin  12.5 mg Oral Daily    insulin glargine  35 Units SubCUTAneous Nightly    midodrine  2.5 mg Oral TID WC    clotrimazole-betamethasone   Topical BID    bismuth subsalicylate  524 mg Oral 4x Daily AC & HS    insulin lispro  0-16 Units SubCUTAneous TID WC    insulin lispro  0-4 Units SubCUTAneous Nightly    sodium chloride flush  10 mL IntraVENous 2 times per day    aspirin  81 mg Oral Daily    ticagrelor  90 mg Oral BID    escitalopram  20 mg Oral QAM    budesonide-formoterol  2 puff Inhalation BID RT    atorvastatin  40 mg Oral Daily    docusate sodium  100 mg Oral BID    ferrous sulfate  325 mg Oral Daily with breakfast    metoprolol tartrate  12.5 mg Oral BID    [Held by provider] oxyBUTYnin  5 mg Oral Daily    pantoprazole  40 mg Oral BID    topiramate  100 mg Oral Nightly     Continuous Infusions:   sodium chloride 50 mL/hr at 02/03/24 0601    dextrose      dextrose       CBC:   No results for input(s): \"WBC\", \"HGB\", \"PLT\" in the last 72 hours.    BMP:    Recent Labs     02/07/24  1219      K 4.4   CL 98   CO2 26   BUN 36*   CREATININE 0.9   GLUCOSE 185*       Hepatic: No results for input(s): \"AST\", \"ALT\", \"ALB\", \"BILITOT\", \"ALKPHOS\" in the last 72 hours.    Troponin: No results for input(s): \"TROPONINI\" in the last 72 hours.  BNP: No results for input(s): \"BNP\" in the last 72

## 2024-02-08 VITALS
OXYGEN SATURATION: 93 % | BODY MASS INDEX: 41.62 KG/M2 | HEIGHT: 62 IN | RESPIRATION RATE: 20 BRPM | HEART RATE: 76 BPM | SYSTOLIC BLOOD PRESSURE: 101 MMHG | TEMPERATURE: 98.2 F | DIASTOLIC BLOOD PRESSURE: 84 MMHG | WEIGHT: 226.19 LBS

## 2024-02-08 LAB
GLUCOSE BLD-MCNC: 125 MG/DL (ref 65–105)
GLUCOSE BLD-MCNC: 206 MG/DL (ref 65–105)

## 2024-02-08 PROCEDURE — 6370000000 HC RX 637 (ALT 250 FOR IP): Performed by: INTERNAL MEDICINE

## 2024-02-08 PROCEDURE — 94761 N-INVAS EAR/PLS OXIMETRY MLT: CPT

## 2024-02-08 PROCEDURE — 6370000000 HC RX 637 (ALT 250 FOR IP): Performed by: NURSE PRACTITIONER

## 2024-02-08 PROCEDURE — 99232 SBSQ HOSP IP/OBS MODERATE 35: CPT | Performed by: INTERNAL MEDICINE

## 2024-02-08 PROCEDURE — 94660 CPAP INITIATION&MGMT: CPT

## 2024-02-08 PROCEDURE — 6370000000 HC RX 637 (ALT 250 FOR IP): Performed by: STUDENT IN AN ORGANIZED HEALTH CARE EDUCATION/TRAINING PROGRAM

## 2024-02-08 PROCEDURE — 2580000003 HC RX 258: Performed by: STUDENT IN AN ORGANIZED HEALTH CARE EDUCATION/TRAINING PROGRAM

## 2024-02-08 PROCEDURE — 94640 AIRWAY INHALATION TREATMENT: CPT

## 2024-02-08 PROCEDURE — 6370000000 HC RX 637 (ALT 250 FOR IP)

## 2024-02-08 PROCEDURE — 2700000000 HC OXYGEN THERAPY PER DAY

## 2024-02-08 PROCEDURE — 82947 ASSAY GLUCOSE BLOOD QUANT: CPT

## 2024-02-08 RX ADMIN — BUDESONIDE AND FORMOTEROL FUMARATE DIHYDRATE 2 PUFF: 160; 4.5 AEROSOL RESPIRATORY (INHALATION) at 08:23

## 2024-02-08 RX ADMIN — FERROUS SULFATE TAB EC 325 MG (65 MG FE EQUIVALENT) 325 MG: 325 (65 FE) TABLET DELAYED RESPONSE at 08:37

## 2024-02-08 RX ADMIN — BISMUTH SUBSALICYLATE 524 MG: 262 TABLET, CHEWABLE ORAL at 11:25

## 2024-02-08 RX ADMIN — PANTOPRAZOLE SODIUM 40 MG: 40 TABLET, DELAYED RELEASE ORAL at 08:36

## 2024-02-08 RX ADMIN — DOCUSATE SODIUM 100 MG: 100 CAPSULE, LIQUID FILLED ORAL at 08:37

## 2024-02-08 RX ADMIN — METFORMIN HYDROCHLORIDE 1000 MG: 500 TABLET ORAL at 08:36

## 2024-02-08 RX ADMIN — INSULIN LISPRO 4 UNITS: 100 INJECTION, SOLUTION INTRAVENOUS; SUBCUTANEOUS at 11:36

## 2024-02-08 RX ADMIN — SODIUM CHLORIDE, PRESERVATIVE FREE 10 ML: 5 INJECTION INTRAVENOUS at 08:37

## 2024-02-08 RX ADMIN — MIDODRINE HYDROCHLORIDE 2.5 MG: 5 TABLET ORAL at 08:37

## 2024-02-08 RX ADMIN — METOPROLOL TARTRATE 12.5 MG: 50 TABLET, FILM COATED ORAL at 08:38

## 2024-02-08 RX ADMIN — ESCITALOPRAM 20 MG: 10 TABLET, FILM COATED ORAL at 08:36

## 2024-02-08 RX ADMIN — ASPIRIN 81 MG 81 MG: 81 TABLET ORAL at 08:37

## 2024-02-08 RX ADMIN — TICAGRELOR 90 MG: 90 TABLET ORAL at 08:36

## 2024-02-08 RX ADMIN — BISMUTH SUBSALICYLATE 524 MG: 262 TABLET, CHEWABLE ORAL at 08:36

## 2024-02-08 RX ADMIN — ATORVASTATIN CALCIUM 40 MG: 40 TABLET, FILM COATED ORAL at 08:37

## 2024-02-08 RX ADMIN — INSULIN GLARGINE 43 UNITS: 100 INJECTION, SOLUTION SUBCUTANEOUS at 08:39

## 2024-02-08 RX ADMIN — ALOGLIPTIN 12.5 MG: 12.5 TABLET, FILM COATED ORAL at 08:37

## 2024-02-08 RX ADMIN — MIDODRINE HYDROCHLORIDE 2.5 MG: 5 TABLET ORAL at 11:26

## 2024-02-08 RX ADMIN — CLOTRIMAZOLE AND BETAMETHASONE DIPROPIONATE: 10; .5 CREAM TOPICAL at 08:37

## 2024-02-08 NOTE — PROGRESS NOTES
[facility]  Office: [phone]  Goldy Moore DO, Ferdinand Villeda DO, Jose Goff DO, Alejo Mao, DO, Mainor Smith MD, Melony Rodriguez MD, Tonia Brenner MD, Alethea Ramsey MD,  Rahul Zheng MD, Josi Saldana MD, Pedro Deras MD,  Lauren Acosta DO, Randee Pantoja MD, Raji Reddy MD, Praveen Moore DO, Veronica Duff MD,  Spencer Ferguson DO, Olivia Boss MD, Veronika Lin MD, Caryl Zacarias MD, Slick Jean MD,  Tacos Yang MD, Guzman Stein MD, Vicente Yee MD, Mickey Avila MD, Conrad Bailey MD, Raisa Felder MD, Travon Blanchard DO, Joel Rome DO, Mariel Francisco MD,  Mika Valentin MD, Shirley Waterhouse, CNP,  Isamar Seals CNP, Jose Warren, CNP,  Jenna Angulo, DNP, Jeannette Bobo, CNP, Sofya Rose, CNP, Zoe Coates CNP, Lara Yan CNP, Michelle García, CNP, Tamara Issa, PA-C, Halina Lynch, PA-C, Greer Harmon, CNP, Keri Weaver, CNP, Cheyanne White, CNS, Heide Collier, CNP, Amber Mast CNP, Tracy Schwab, CNP         [facility]   IN-PATIENT SERVICE   [facility] [place]    Progress Note    2/8/2024    11:50 AM    Name:   Mariangel Abreu  MRN:     [id number]     Acct:      [id number]   Room:   2017/2017-01  IP Day:  7  Admit Date:  2/1/2024 12:34 PM    PCP:   Carla Chua APRN - CNP  Code Status:  Full Code    Subjective:     C/C: Chest pain    Interval History Status: .   Still having mild cough with clear  expectoration, she is allergic to Mucinex, getting breathing treatments, denies shortness of breath  Pre-CERT has been obtained for SNF at [facility]    Brief History:     73-year-old patient with multiple medical conditions listed below who presents to the emergency department for chest pain, Was found to have an inferior STEMI taken for emergent cardiac cath where PCI with ELEANOR to mid RCA was completed.  There is residual high-grade LAD diagonal stenosis requiring staged PCI to LAD in 4 to

## 2024-02-08 NOTE — PROGRESS NOTES
Nutrition Assessment     Type and Reason for Visit: Initial, RD Nutrition Re-Screen/LOS    Nutrition Recommendations/Plan:   Continue current diet order       Nutrition Assessment:  Pt seen for LOS, admitted for STEMI with Hx CKD, CHF, CAD, COPD and DM.  Pt currently on Regular diet with excellent intakes, % of most meals consumed per nursing notes.  This likely meets nutritional needs.  Weight records show minimal change in the last year.  No current nutritional problems identified so that full nutrition assessment is now warranted at this time.      Nutrition Related Findings:   Meds/Labs reviewed. Wound Type: None    Current Nutrition Therapies:    ADULT DIET; Regular    Anthropometric Measures:  Height: 157.5 cm (5' 2.01\")  Current Body Wt: 102.6 kg (226 lb 3.1 oz)   BMI: 41.4    Nutrition Diagnosis:   No nutrition diagnosis at this time    Nutrition Interventions:   Food and/or Nutrient Delivery: Continue Current Diet  Nutrition Education/Counseling: No recommendation at this time  Coordination of Nutrition Care: Continue to monitor while inpatient       Goals:     Goals: PO intake 75% or greater, prior to discharge       Nutrition Monitoring and Evaluation:      Food/Nutrient Intake Outcomes: Food and Nutrient Intake  Physical Signs/Symptoms Outcomes: Biochemical Data, GI Status, Fluid Status or Edema, Skin, Weight    Discharge Planning:    Continue current diet     Angie Arias RD  Contact: [phone]

## 2024-02-08 NOTE — PLAN OF CARE
Problem: Chronic Conditions and Co-morbidities  Goal: Patient's chronic conditions and co-morbidity symptoms are monitored and maintained or improved  2/8/2024 0200 by Shanita Pierce RN  Outcome: Progressing    Problem: Pain  Goal: Verbalizes/displays adequate comfort level or baseline comfort level  2/8/2024 0200 by Shanita Pierce RN  Outcome: Progressing    Problem: Discharge Planning  Goal: Discharge to home or other facility with appropriate resources  2/8/2024 0200 by Shanita Pierce RN  Outcome: Progressing    Problem: Skin/Tissue Integrity  Goal: Absence of new skin breakdown  Description: 1.  Monitor for areas of redness and/or skin breakdown  2.  Assess vascular access sites hourly  3.  Every 4-6 hours minimum:  Change oxygen saturation probe site  4.  Every 4-6 hours:  If on nasal continuous positive airway pressure, respiratory therapy assess nares and determine need for appliance change or resting period.  2/8/2024 0200 by Shanita Pierce RN  Outcome: Progressing    Problem: Safety - Adult  Goal: Free from fall injury  2/8/2024 0200 by Shanita Pierce RN  Outcome: Progressing

## 2024-02-08 NOTE — CARE COORDINATION
Transitional Planning:   Haleigh from [facility] states authorization approved. States the sooner the better . Fax'd [facility] for soonest available time. MAY completed and fax'd .   1100 Leidy from [facility] states 2pm p/u . Pt and RN notified, pt will update gela Samaniego as CM unable to reach, call does not go through to phone   1140-Spoke with Haleigh at [facility] and notified of p/u time. Report # [phone]  1205- HENS completed

## 2024-02-08 NOTE — DISCHARGE SUMMARY
[facility]  Discharge Note                 Name:  Mariangel Abreu  YOB: 1950  Social Security Number:  [id number]  Medical Record Number:  [id number]    Date of Admission:  2/1/2024  Date of Discharge:  2/8/2024    Admitting physician: Victoriano Dee MD    Discharge Attending: VERONIKA Chavez NP CNP  Primary Care Physician: Carla Chua, APRN - CNP  Consultants: Internal Medicine  Discharge to home in stable condition     HOSPITAL ADMISSION PROBLEM LIST:  Patient Active Problem List   Diagnosis    Chronic pain of left knee    Type 2 diabetes mellitus with diabetic polyneuropathy, with long-term current use of insulin ([facility])    COPD (chronic obstructive pulmonary disease)    Nonintractable migraine, unspecified migraine type    Coronary artery disease due to lipid rich plaque    DDD (degenerative disc disease), lumbar    DDD (degenerative disc disease), cervical    Osteoarthritis of cervical spine    GERD (gastroesophageal reflux disease)    Essential hypertension    Mixed hyperlipidemia    Insomnia    Lumbosacral spondylosis without myelopathy    Sacroiliitis, not elsewhere classified ([facility])    Carpal tunnel syndrome    Mixed stress and urge urinary incontinence    Intractable migraine with aura without status migrainosus    Anxiety and depression    Chronic migraine without aura without status migrainosus, not intractable    Morbid (severe) obesity with alveolar hypoventilation ([facility])    Chronic nausea    Lung nodule    Neuropathy    Obstructive sleep apnea syndrome    Asthma with COPD    Obesity, Class III, BMI 40-49.9 (morbid obesity) ([facility])    Stage 3 chronic kidney disease ([facility])    Benign hypertension with CKD (chronic kidney disease) stage III ([facility])    Secondary diabetes mellitus with stage 3 chronic kidney disease (GFR 30-59) ([facility])    CKD (chronic kidney disease) stage 3, GFR 30-59 ml/min    Lumbar radiculopathy, chronic    Sessile colonic polyp    Morbid  Take 1 tablet by   mouth nightly   UltiCare Mini Pen Needles 31G X 6 MM Misc; Generic drug: Insulin Pen   Needle; Inject 3 times daily  * This list has 2 medication(s) that are the same as other medications   prescribed for you. Read the directions carefully, and ask your doctor or   other care provider to review them with you.     STOP taking these medications     Lantus SoloStar 100 UNIT/ML injection pen; Generic drug: insulin   glargine; Replaced by: insulin glargine 100 UNIT/ML injection vial   nystatin 477273 UNIT/GM powder; Commonly known as: MYCOSTATIN   oxyCODONE-acetaminophen 5-325 MG per tablet; Commonly known as: Percocet   SITagliptin 50 MG tablet; Commonly known as: Januvia   Trulicity 1.5 MG/0.5ML SC injection; Generic drug: dulaglutide          Coronary Discharge Core Measure: Please indicate the medication given by X, and if not the reasons not given:    Not Given Reason  Given      Beta Blockers x      ACE-I       Statins x      ASA x       OAP (Plavix/Effient/Brilinta) Brilinta     SL Nitro   x           Discussed with patient and nursing. Medications and discharge instructions reviewed with patient . Discussed in detail with patient post cath POC including but not limited to medications, diet, exercise,  artery site care, and follow-up. Questions and concerns addressed. OK for discharge home today. F/U in office in as scheduled  needs Staged PCI to the LAD in 4 to 6 weeks     Electronically signed by VERONIKA Chavez NP on 2/8/2024 at 1:00 PM  [facility]      [phone]

## 2024-02-12 ENCOUNTER — CARE COORDINATION (OUTPATIENT)
Dept: CARE COORDINATION | Age: 74
End: 2024-02-12

## 2024-02-12 NOTE — CARE COORDINATION
Ambulatory Care Coordination Note  2024    Patient Current Location:  [place]     ACM contacted the patient by telephone. Verified name and  with patient as identifiers. Provided introduction to self, and explanation of the ACM role.     Challenges to be reviewed by the provider   Additional needs identified to be addressed with provider: No  none               Method of communication with provider: none.    ACM: Travon Cervantes, RN    Spoke with patient briefly States that she is currently at [facility] and will be there for an undetermined amount of time. Currently receiving Rehab at the facility. Will F/U in about 10 days or at discharge from SNF    Offered patient enrollment in the Remote Patient Monitoring (RPM) program for in-home monitoring: Yes, patient already enrolled.    Lab Results       None            Care Coordination Interventions    Referral from Primary Care Provider: No  Suggested Interventions and Community Resources  Behavorial Health: In Process  Diabetes Education: In Process  Fall Risk Prevention: In Process  Home Health Services: In Process (Comment: AOOA)  Medi Set or Pill Pack: Not Started  Registered Dietician: Not Started  Transportation Support: In Process  Zone Management Tools: Completed (Comment: HF, COPD, DM right time right care)          Goals Addressed    None         Future Appointments   Date Time Provider Department Center   2024  3:00 PM Karen Cardoso, APRN - CNP [facility] PAIN[facility] [place]   3/6/2024  3:15 PM Ángela Collier, VERONIKA - NP [facility]   2024  1:30 PM Carla Chua APRN - CNP [facility]

## 2024-02-13 ENCOUNTER — HOSPITAL ENCOUNTER (OUTPATIENT)
Age: 74
Setting detail: SPECIMEN
Discharge: HOME | End: 2024-02-13

## 2024-02-13 LAB
ANION GAP SERPL CALCULATED.3IONS-SCNC: 12 MMOL/L (ref 9–16)
BASOPHILS # BLD: 0.08 K/UL (ref 0–0.2)
BASOPHILS NFR BLD: 1 % (ref 0–2)
BUN SERPL-MCNC: 19 MG/DL (ref 8–23)
CALCIUM SERPL-MCNC: 9.4 MG/DL (ref 8.6–10.4)
CHLORIDE SERPL-SCNC: 103 MMOL/L (ref 98–107)
CO2 SERPL-SCNC: 25 MMOL/L (ref 20–31)
CREAT SERPL-MCNC: 1 MG/DL (ref 0.5–0.9)
EOSINOPHIL # BLD: 0.38 K/UL (ref 0–0.44)
EOSINOPHILS RELATIVE PERCENT: 4 % (ref 1–4)
ERYTHROCYTE [DISTWIDTH] IN BLOOD BY AUTOMATED COUNT: 12.8 % (ref 11.8–14.4)
GFR SERPL CREATININE-BSD FRML MDRD: 60 ML/MIN/1.73M2
GLUCOSE SERPL-MCNC: 72 MG/DL (ref 74–99)
HCT VFR BLD AUTO: 40.6 % (ref 36.3–47.1)
HGB BLD-MCNC: 13.2 G/DL (ref 11.9–15.1)
IMM GRANULOCYTES # BLD AUTO: 0.12 K/UL (ref 0–0.3)
IMM GRANULOCYTES NFR BLD: 1 %
LYMPHOCYTES NFR BLD: 2.04 K/UL (ref 1.1–3.7)
LYMPHOCYTES RELATIVE PERCENT: 21 % (ref 24–43)
MCH RBC QN AUTO: 29.9 PG (ref 25.2–33.5)
MCHC RBC AUTO-ENTMCNC: 32.5 G/DL (ref 28.4–34.8)
MCV RBC AUTO: 91.9 FL (ref 82.6–102.9)
MONOCYTES NFR BLD: 0.85 K/UL (ref 0.1–1.2)
MONOCYTES NFR BLD: 9 % (ref 3–12)
NEUTROPHILS NFR BLD: 64 % (ref 36–65)
NEUTS SEG NFR BLD: 6.28 K/UL (ref 1.5–8.1)
NRBC BLD-RTO: 0 PER 100 WBC
PLATELET # BLD AUTO: 244 K/UL (ref 138–453)
PMV BLD AUTO: 11.9 FL (ref 8.1–13.5)
POTASSIUM SERPL-SCNC: 3.9 MMOL/L (ref 3.7–5.3)
RBC # BLD AUTO: 4.42 M/UL (ref 3.95–5.11)
SODIUM SERPL-SCNC: 140 MMOL/L (ref 136–145)
WBC OTHER # BLD: 9.8 K/UL (ref 3.5–11.3)

## 2024-02-13 PROCEDURE — 85025 COMPLETE CBC W/AUTO DIFF WBC: CPT

## 2024-02-13 PROCEDURE — 80048 BASIC METABOLIC PNL TOTAL CA: CPT

## 2024-02-13 PROCEDURE — P9603 ONE-WAY ALLOW PRORATED MILES: HCPCS

## 2024-02-13 PROCEDURE — 36415 COLL VENOUS BLD VENIPUNCTURE: CPT

## 2024-02-21 ENCOUNTER — TELEPHONE (OUTPATIENT)
Dept: PRIMARY CARE CLINIC | Age: 74
End: 2024-02-21

## 2024-02-21 NOTE — TELEPHONE ENCOUNTER
[facility]  called to see if Carla Chua CNP  will follow for home care. Please advise.    Tia 1- [phone] opt 1 fax [phone]

## 2024-02-21 NOTE — PROGRESS NOTES
Physician Progress Note      PATIENT:               NO BARLOW  CSN #:                  [id number]  :                       1950  ADMIT DATE:       2024 12:34 PM  DISCH DATE:        2024 2:51 PM  RESPONDING  PROVIDER #:        Victoriano Dee MD          QUERY TEXT:    Pt admitted with STEMI.  Noted documentation of DKA on medical consult note of   .  If possible, please clarify if DKA was ruled in or out or defer to   medical consultation:    The medical record reflects the following:  Risk Factors: DM2 noted as uncontrolled.  Clinical Indicators: PN of  noted as DKA resolved and pt off of IV insulin   infusion. GLUCOSE -   - 355, 427, 434, 457, 474, 361.  - 306, 269, 203,   182, 186, 210, 198, 181, 363, 306, 400, 363. ANION GAP --15; -12; 2/3-9.    HBGA1C - 14.1   IM note: DKA, DKA resolved.  Treatment: IV insulin infusion noted as given from  @ 22:03 -  @ 06:53,   with sliding scale coverage through out stay and adjustment ot routine insulin   coverage.    Thank you, please contact me for any questions.  Praveen Montiel RN, [facility]  cell- [phone]  office hours - [id number]  Options provided:  -- DKA confirmed present on admission and resolved  -- DKA ruled out  -- Defer to medical consultant documentation regarding DKA, POA and resolved  -- Other - I will add my own diagnosis  -- Disagree - Not applicable / Not valid  -- Disagree - Clinically unable to determine / Unknown  -- Refer to Clinical Documentation Reviewer    PROVIDER RESPONSE TEXT:    The diagnosis of DKA was ruled out.    Query created by: Praveen Montiel on 2024 7:11 AM      Electronically signed by:  Victoriano Dee MD 2024 9:44 AM

## 2024-02-22 ENCOUNTER — CARE COORDINATION (OUTPATIENT)
Dept: CARE COORDINATION | Age: 74
End: 2024-02-22

## 2024-02-22 NOTE — CARE COORDINATION
VERONIKA STALLINGS - NP [facility]   4/17/2024  1:30 PM Carla [facility]

## 2024-02-27 ENCOUNTER — CARE COORDINATION (OUTPATIENT)
Dept: CASE MANAGEMENT | Age: 74
End: 2024-02-27

## 2024-02-27 ENCOUNTER — HOSPITAL ENCOUNTER (OUTPATIENT)
Dept: PAIN MANAGEMENT | Age: 74
Discharge: HOME | End: 2024-02-27
Payer: MEDICARE

## 2024-02-27 VITALS — WEIGHT: 226 LBS | BODY MASS INDEX: 41.59 KG/M2 | HEIGHT: 62 IN

## 2024-02-27 DIAGNOSIS — M54.16 LUMBAR RADICULOPATHY, CHRONIC: ICD-10-CM

## 2024-02-27 DIAGNOSIS — M50.30 DDD (DEGENERATIVE DISC DISEASE), CERVICAL: Chronic | ICD-10-CM

## 2024-02-27 DIAGNOSIS — Z79.891 CHRONIC USE OF OPIATE FOR THERAPEUTIC PURPOSE: ICD-10-CM

## 2024-02-27 DIAGNOSIS — M54.41 CHRONIC BILATERAL LOW BACK PAIN WITH BILATERAL SCIATICA: Chronic | ICD-10-CM

## 2024-02-27 DIAGNOSIS — G89.29 CHRONIC BILATERAL LOW BACK PAIN WITH BILATERAL SCIATICA: Chronic | ICD-10-CM

## 2024-02-27 DIAGNOSIS — M47.817 LUMBOSACRAL SPONDYLOSIS WITHOUT MYELOPATHY: Primary | Chronic | ICD-10-CM

## 2024-02-27 DIAGNOSIS — M46.1 SACROILIITIS, NOT ELSEWHERE CLASSIFIED (HCC): Chronic | ICD-10-CM

## 2024-02-27 DIAGNOSIS — M51.36 DDD (DEGENERATIVE DISC DISEASE), LUMBAR: ICD-10-CM

## 2024-02-27 DIAGNOSIS — M54.42 CHRONIC BILATERAL LOW BACK PAIN WITH BILATERAL SCIATICA: Chronic | ICD-10-CM

## 2024-02-27 PROCEDURE — 99213 OFFICE O/P EST LOW 20 MIN: CPT | Performed by: NURSE PRACTITIONER

## 2024-02-27 PROCEDURE — 99213 OFFICE O/P EST LOW 20 MIN: CPT

## 2024-02-27 RX ORDER — OXYCODONE HYDROCHLORIDE AND ACETAMINOPHEN 5; 325 MG/1; MG/1
1 TABLET ORAL EVERY 8 HOURS PRN
Qty: 90 TABLET | Refills: 0 | Status: SHIPPED | OUTPATIENT
Start: 2024-02-27 | End: 2024-03-28

## 2024-02-27 ASSESSMENT — ENCOUNTER SYMPTOMS
COUGH: 0
SHORTNESS OF BREATH: 0
BACK PAIN: 1
CONSTIPATION: 0

## 2024-02-27 ASSESSMENT — PAIN SCALES - GENERAL: PAINLEVEL_OUTOF10: 7

## 2024-02-27 NOTE — PROGRESS NOTES
Questionnaire     Feeling of Stress : To some extent   Social Connections: Socially Isolated (1/29/2024)    Social Connection and Isolation Panel [NHANES]     Frequency of Communication with Friends and Family: More than three times a week     Frequency of Social Gatherings with Friends and Family: More than three times a week     Attends Yarsani Services: Never     Active Member of Clubs or Organizations: No     Attends Club or Organization Meetings: Never     Marital Status:    Intimate Partner Violence: Not on file   Housing Stability: Low Risk  (1/29/2024)    Housing Stability Vital Sign     Unable to Pay for Housing in the Last Year: No     Number of Places Lived in the Last Year: 1     Unstable Housing in the Last Year: No       Review of Systems:  Review of Systems   Constitutional: Negative for chills and fever.   Cardiovascular:  Negative for chest pain.   Respiratory:  Negative for cough and shortness of breath.    Musculoskeletal:  Positive for arthritis, back pain and muscle weakness.   Gastrointestinal:  Negative for constipation.       Physical Exam:  Ht 1.575 m (5' 2\")   Wt 102.5 kg (226 lb)   LMP  (LMP Unknown)   BMI 41.34 kg/m²     Physical Exam  Constitutional:       General: She is not in acute distress.     Appearance: Normal appearance. She is obese.   Pulmonary:      Effort: Pulmonary effort is normal.   Musculoskeletal:         General: Normal range of motion.      Comments: Gait is not assessed, pt in scooter for visit    Skin:     General: Skin is warm and dry.   Neurological:      Mental Status: She is alert and oriented to person, place, and time.   Psychiatric:         Mood and Affect: Mood normal.         Behavior: Behavior normal.         Record/Diagnostics Review:    Last greg  10/23 and was appropriate     Assessment:  Problem List Items Addressed This Visit       Chronic bilateral low back pain with bilateral sciatica (Chronic)    Relevant Medications

## 2024-02-27 NOTE — CARE COORDINATION
Care Transitions Outreach Attempt    Call within 2 business days of discharge: Yes   Attempted to reach patient for transitions of care follow up. Unable to reach patient or leave a  d/t mailbox is full.    Patient: Mariangel Abreu Patient : 1950 MRN: <[id number]>    Last Discharge Facility       Date Complaint Diagnosis Description Type Department Provider    24 chest pain, possible stemi ST elevation myocardial infarction (STEMI), unspecified artery (HCC) ... ED to Hosp-Admission (Discharged) (ADMIT) [facility] CAR 2 Victoriano Dee MD; Dalton Garcia..              Was this an external facility discharge? Yes, 24  Discharge Facility Name: [facility]    Noted following upcoming appointments from discharge chart review:   [facility] follow up appointment(s):   Future Appointments   Date Time Provider Department Center   2024  2:40 PM Prabha Hammond APRN - CNP [facility] PAINMGT [place]   3/6/2024  3:15 PM Ángela Collier APRN - NP [facility]   2024  8:15 AM STC CARD REHAB RM 01 [facility]Z CARDIAC [facility]   2024  1:30 PM Carla Chua APRN - CNP [facility]

## 2024-02-28 ENCOUNTER — CARE COORDINATION (OUTPATIENT)
Dept: CARE COORDINATION | Age: 74
End: 2024-02-28

## 2024-02-28 ENCOUNTER — CARE COORDINATION (OUTPATIENT)
Dept: CASE MANAGEMENT | Age: 74
End: 2024-02-28

## 2024-02-28 NOTE — CARE COORDINATION
Ambulatory Care Coordination Note  2024    Patient Current Location:  Home: [address]  [place] [address]     ACM contacted the patient by telephone. Verified name and  with patient as identifiers. Provided introduction to self, and explanation of the ACM role.     Challenges to be reviewed by the provider   Additional needs identified to be addressed with provider: Yes  F/U appointment               Method of communication with provider: staff message.    ACM: Travon Cervantes, RN    Spoke with Mariangel. States that she feels pretty good. Has all equipment that she needs at home. Still needs HH aide. Requested an appointment with PCP.  Appointment made for 3/5 at 10:45    Offered patient enrollment in the Remote Patient Monitoring (RPM) program for in-home monitoring: Yes, patient already enrolled.    Lab Results       None            Care Coordination Interventions    Referral from Primary Care Provider: No  Suggested Interventions and Community Resources  Behavorial Health: In Process  Diabetes Education: In Process  Fall Risk Prevention: In Process  Home Health Services: In Process (Comment: AOOA)  Medi Set or Pill Pack: Not Started  Registered Dietician: Not Started  Transportation Support: In Process  Zone Management Tools: Completed (Comment: HF, COPD, DM right time right care)          Goals Addressed    None         Future Appointments   Date Time Provider Department Center   3/5/2024 10:45 AM Carla Chua APRN - CNP [facility]   3/6/2024  3:15 PM Ángela Collier APRN - NP [facility]   3/26/2024 10:00 AM Prabha Hammond APRN - CNP [facility] PAINMGT [place]   2024  8:15 AM ST CARD REHAB RM 01 [facility]   2024  1:30 PM Carla Chua APRN - CNP [facility]

## 2024-02-28 NOTE — CARE COORDINATION
Care Transitions Outreach Attempt    Call within 2 business days of discharge: Yes     Attempted to reach patient for transitions of care follow up. Unable to reach patient or leave a  d/t mailbox is full. Will hand off to [facility].    Patient: Mariangel Abreu Patient : 1950 MRN: <[id number]>    Last Discharge Facility       Date Complaint Diagnosis Description Type Department Provider    24 chest pain, possible stemi ST elevation myocardial infarction (STEMI), unspecified artery (HCC) ... ED to Hosp-Admission (Discharged) (ADMIT) [facility] CAR 2 Victoriano Dee MD; Dalton Garcia..              Was this an external facility discharge? Yes, 24  Discharge Facility Name: [facility]    Noted following upcoming appointments from discharge chart review:   [facility] follow up appointment(s):   Future Appointments   Date Time Provider Department Center   3/5/2024 10:45 AM Carla Chua APRN - CNP [facility]   3/6/2024  3:15 PM Ángela Collier APRN - NP [facility]   3/26/2024 10:00 AM Prabha Hammond APRN - CNP [facility] PAINMGT [place]   2024  8:15 AM STC CARD REHAB RM 01 [facility]Z CARDIAC [facility]   2024  1:30 PM Carla Chua APRN - CNP [facility]

## 2024-03-04 ENCOUNTER — TELEPHONE (OUTPATIENT)
Dept: PRIMARY CARE CLINIC | Age: 74
End: 2024-03-04

## 2024-03-04 ENCOUNTER — CARE COORDINATION (OUTPATIENT)
Dept: CARE COORDINATION | Age: 74
End: 2024-03-04

## 2024-03-04 ASSESSMENT — ENCOUNTER SYMPTOMS: DYSPNEA ASSOCIATED WITH: EXERTION

## 2024-03-04 NOTE — CARE COORDINATION
of low blood sugar?: No       No patient-reported symptoms   Do you have hyperglycemia symptoms?: No   Do you have hypoglycemia symptoms?: No   Last Blood Sugar Value: 202   Blood Sugar Monitoring Regimen: Before Meals, At Bedtime   Blood Sugar Trends: Fluctuating        ,   Congestive Heart Failure Assessment    Are you currently restricting fluids?: No Restriction  Do you understand a low sodium diet?: Yes  Do you understand how to read food labels?: Yes  How many restaurant meals do you eat per week?: 0  Do you salt your food before tasting it?: No     No patient-reported symptoms      Symptoms:  None: Yes      Symptom course: stable  Patient-reported weight (lb): 226  Weight trend: stable  Salt intake watch compared to last visit: stable     ,   COPD Assessment    Does the patient understand envrionmental exposure?: Yes  Is the patient able to verbalize Rescue vs. Long Acting medications?: Yes  Does the patient have a nebulizer?: Yes  Does the patient use a space with inhaled medications?: No     No patient-reported symptoms         Symptoms:  None: Yes      Symptom course: stable  Breathlessness: exertion  Increase use of rapid acting/rescue inhaled medications?: No  Change in chronic cough?: No/At Baseline  Change in sputum?: No/At Baseline  Self Monitoring - SaO2: No  Baseline SaO2 Readin  Have you had a recent diagnosis of pneumonia either by PCP or at a hospital?: No     , and   Hypertension - Encounter Level    Symptom course: stable

## 2024-03-05 ENCOUNTER — OFFICE VISIT (OUTPATIENT)
Dept: PRIMARY CARE CLINIC | Age: 74
End: 2024-03-05
Payer: MEDICARE

## 2024-03-05 VITALS
WEIGHT: 222 LBS | HEART RATE: 78 BPM | DIASTOLIC BLOOD PRESSURE: 80 MMHG | SYSTOLIC BLOOD PRESSURE: 138 MMHG | OXYGEN SATURATION: 93 % | BODY MASS INDEX: 40.6 KG/M2

## 2024-03-05 DIAGNOSIS — I25.10 CORONARY ARTERY DISEASE DUE TO LIPID RICH PLAQUE: Primary | ICD-10-CM

## 2024-03-05 DIAGNOSIS — I48.21 PERMANENT ATRIAL FIBRILLATION (HCC): ICD-10-CM

## 2024-03-05 DIAGNOSIS — I25.83 CORONARY ARTERY DISEASE DUE TO LIPID RICH PLAQUE: Primary | ICD-10-CM

## 2024-03-05 DIAGNOSIS — Z09 HOSPITAL DISCHARGE FOLLOW-UP: ICD-10-CM

## 2024-03-05 DIAGNOSIS — Z79.4 TYPE 2 DIABETES MELLITUS WITH DIABETIC POLYNEUROPATHY, WITH LONG-TERM CURRENT USE OF INSULIN (HCC): ICD-10-CM

## 2024-03-05 DIAGNOSIS — E11.42 TYPE 2 DIABETES MELLITUS WITH DIABETIC POLYNEUROPATHY, WITH LONG-TERM CURRENT USE OF INSULIN (HCC): ICD-10-CM

## 2024-03-05 DIAGNOSIS — I13.2 HYPERTENSIVE HEART AND CHRONIC KIDNEY DISEASE WITH HEART FAILURE AND WITH STAGE 5 CHRONIC KIDNEY DISEASE, OR END STAGE RENAL DISEASE (HCC): ICD-10-CM

## 2024-03-05 DIAGNOSIS — I21.3 ST ELEVATION MYOCARDIAL INFARCTION (STEMI), UNSPECIFIED ARTERY (HCC): ICD-10-CM

## 2024-03-05 PROBLEM — Z79.01 LONG TERM (CURRENT) USE OF ANTICOAGULANTS: Status: ACTIVE | Noted: 2024-02-08

## 2024-03-05 PROBLEM — I34.0 NONRHEUMATIC MITRAL (VALVE) INSUFFICIENCY: Status: ACTIVE | Noted: 2024-02-08

## 2024-03-05 PROBLEM — Z95.5 PRESENCE OF CORONARY ANGIOPLASTY IMPLANT AND GRAFT: Status: ACTIVE | Noted: 2024-02-08

## 2024-03-05 PROBLEM — Z86.0100 PERSONAL HISTORY OF COLONIC POLYPS: Status: ACTIVE | Noted: 2024-02-08

## 2024-03-05 PROBLEM — Z90.49 ACQUIRED ABSENCE OF OTHER SPECIFIED PARTS OF DIGESTIVE TRACT: Status: ACTIVE | Noted: 2024-02-08

## 2024-03-05 PROBLEM — Z87.01 PERSONAL HISTORY OF PNEUMONIA (RECURRENT): Status: ACTIVE | Noted: 2024-02-08

## 2024-03-05 PROBLEM — R53.1 WEAKNESS: Status: ACTIVE | Noted: 2024-02-08

## 2024-03-05 PROBLEM — Z86.010 PERSONAL HISTORY OF COLONIC POLYPS: Status: ACTIVE | Noted: 2024-02-08

## 2024-03-05 PROBLEM — Z28.311 PARTIALLY VACCINATED FOR COVID-19: Status: ACTIVE | Noted: 2024-02-08

## 2024-03-05 PROBLEM — Z87.442 PERSONAL HISTORY OF URINARY CALCULI: Status: ACTIVE | Noted: 2024-02-08

## 2024-03-05 PROCEDURE — G8427 DOCREV CUR MEDS BY ELIG CLIN: HCPCS | Performed by: NURSE PRACTITIONER

## 2024-03-05 PROCEDURE — 1111F DSCHRG MED/CURRENT MED MERGE: CPT | Performed by: NURSE PRACTITIONER

## 2024-03-05 PROCEDURE — 1090F PRES/ABSN URINE INCON ASSESS: CPT | Performed by: NURSE PRACTITIONER

## 2024-03-05 PROCEDURE — 3017F COLORECTAL CA SCREEN DOC REV: CPT | Performed by: NURSE PRACTITIONER

## 2024-03-05 PROCEDURE — 1123F ACP DISCUSS/DSCN MKR DOCD: CPT | Performed by: NURSE PRACTITIONER

## 2024-03-05 PROCEDURE — 3075F SYST BP GE 130 - 139MM HG: CPT | Performed by: NURSE PRACTITIONER

## 2024-03-05 PROCEDURE — 3079F DIAST BP 80-89 MM HG: CPT | Performed by: NURSE PRACTITIONER

## 2024-03-05 PROCEDURE — 2022F DILAT RTA XM EVC RTNOPTHY: CPT | Performed by: NURSE PRACTITIONER

## 2024-03-05 PROCEDURE — G8484 FLU IMMUNIZE NO ADMIN: HCPCS | Performed by: NURSE PRACTITIONER

## 2024-03-05 PROCEDURE — 1036F TOBACCO NON-USER: CPT | Performed by: NURSE PRACTITIONER

## 2024-03-05 PROCEDURE — 99214 OFFICE O/P EST MOD 30 MIN: CPT | Performed by: NURSE PRACTITIONER

## 2024-03-05 PROCEDURE — G8399 PT W/DXA RESULTS DOCUMENT: HCPCS | Performed by: NURSE PRACTITIONER

## 2024-03-05 PROCEDURE — G8417 CALC BMI ABV UP PARAM F/U: HCPCS | Performed by: NURSE PRACTITIONER

## 2024-03-05 PROCEDURE — 3046F HEMOGLOBIN A1C LEVEL >9.0%: CPT | Performed by: NURSE PRACTITIONER

## 2024-03-05 RX ORDER — INSULIN GLARGINE 100 [IU]/ML
INJECTION, SOLUTION SUBCUTANEOUS
COMMUNITY
Start: 2024-02-26 | End: 2024-03-05 | Stop reason: ALTCHOICE

## 2024-03-05 RX ORDER — INSULIN GLARGINE 100 [IU]/ML
INJECTION, SOLUTION SUBCUTANEOUS
Qty: 10 ADJUSTABLE DOSE PRE-FILLED PEN SYRINGE | Refills: 1
Start: 2024-03-05

## 2024-03-05 RX ORDER — ISOSORBIDE DINITRATE 30 MG/1
30 TABLET ORAL DAILY
Qty: 30 TABLET | Refills: 11 | Status: SHIPPED | OUTPATIENT
Start: 2024-03-05

## 2024-03-05 SDOH — ECONOMIC STABILITY: FOOD INSECURITY: WITHIN THE PAST 12 MONTHS, YOU WORRIED THAT YOUR FOOD WOULD RUN OUT BEFORE YOU GOT MONEY TO BUY MORE.: NEVER TRUE

## 2024-03-05 SDOH — ECONOMIC STABILITY: INCOME INSECURITY: HOW HARD IS IT FOR YOU TO PAY FOR THE VERY BASICS LIKE FOOD, HOUSING, MEDICAL CARE, AND HEATING?: NOT HARD AT ALL

## 2024-03-05 SDOH — ECONOMIC STABILITY: FOOD INSECURITY: WITHIN THE PAST 12 MONTHS, THE FOOD YOU BOUGHT JUST DIDN'T LAST AND YOU DIDN'T HAVE MONEY TO GET MORE.: NEVER TRUE

## 2024-03-05 NOTE — PROGRESS NOTES
Post-Discharge Transitional Care  Follow Up      Mariangel Abreu   YOB: 1950    Date of Office Visit:  3/5/2024  Date of Hospital Admission: 2/1/24  Date of Hospital Discharge: 2/8/24  Risk of hospital readmission (high >=14%. Medium >=10%) :Readmission Risk Score: 21.5      Care management risk score Rising risk (score 2-5) and Complex Care (Scores >=6): No Risk Score On File     Non face to face  following discharge, date last encounter closed (first attempt may have been earlier): 02/28/2024    Call initiated 2 business days of discharge: Yes    ASSESSMENT/PLAN:   Coronary artery disease due to lipid rich plaque  -     ticagrelor (BRILINTA) 90 MG TABS tablet; Take 1 tablet by mouth 2 times daily, Disp-60 tablet, R-1Normal  -     isosorbide dinitrate (ISORDIL) 30 MG tablet; Take 1 tablet by mouth daily, Disp-30 tablet, R-11Normal  Hospital discharge follow-up  -     NM DISCHARGE MEDS RECONCILED W/ CURRENT OUTPATIENT MED LIST  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)  -     insulin glargine (LANTUS SOLOSTAR) 100 UNIT/ML injection pen; Inject 43 Units into the skin every morning AND 35 Units nightly., Disp-10 Adjustable Dose Pre-filled Pen Syringe, R-1Adjust Sig  ST elevation myocardial infarction (STEMI), unspecified artery (HCC)    Patient without episodes of hypoglycemia, encouraged to continue dietary modification and may utilize twice daily basal insulin and mealtime insulin.  To provide refill of Brilinta today until she is able to see cardiology.      Medical Decision Making: moderate complexity  No follow-ups on file.           Subjective:   HPI:  Follow up of Hospital problems/diagnosis(es): Patient scheduled PCI with Dr. Dee on 3/11/2024.  Discharged

## 2024-03-08 ENCOUNTER — CARE COORDINATION (OUTPATIENT)
Dept: CARE COORDINATION | Age: 74
End: 2024-03-08

## 2024-03-11 ENCOUNTER — HOSPITAL ENCOUNTER (INPATIENT)
Age: 74
LOS: 3 days | Discharge: NURSING, REHAB OR LONG-TERM CARE | DRG: 324 | End: 2024-03-14
Attending: INTERNAL MEDICINE | Admitting: INTERNAL MEDICINE
Payer: MEDICARE

## 2024-03-11 DIAGNOSIS — M25.562 CHRONIC PAIN OF LEFT KNEE: ICD-10-CM

## 2024-03-11 DIAGNOSIS — I25.119 CORONARY ARTERY DISEASE WITH ANGINA PECTORIS, UNSPECIFIED VESSEL OR LESION TYPE, UNSPECIFIED WHETHER NATIVE OR TRANSPLANTED HEART (HCC): ICD-10-CM

## 2024-03-11 DIAGNOSIS — M46.1 SACROILIITIS, NOT ELSEWHERE CLASSIFIED (HCC): Chronic | ICD-10-CM

## 2024-03-11 DIAGNOSIS — G89.29 CHRONIC PAIN OF LEFT KNEE: ICD-10-CM

## 2024-03-11 DIAGNOSIS — G62.9 POLYNEUROPATHY, UNSPECIFIED: Primary | ICD-10-CM

## 2024-03-11 DIAGNOSIS — M47.817 LUMBOSACRAL SPONDYLOSIS WITHOUT MYELOPATHY: Chronic | ICD-10-CM

## 2024-03-11 PROBLEM — I25.2 HISTORY OF ST ELEVATION MYOCARDIAL INFARCTION (STEMI): Status: ACTIVE | Noted: 2024-03-11

## 2024-03-11 LAB
ANION GAP SERPL CALCULATED.3IONS-SCNC: 15 MMOL/L (ref 9–16)
BASOPHILS # BLD: <0.03 K/UL (ref 0–0.2)
BASOPHILS NFR BLD: 0 % (ref 0–2)
BUN SERPL-MCNC: 27 MG/DL (ref 8–23)
CALCIUM SERPL-MCNC: 9.2 MG/DL (ref 8.6–10.4)
CHLORIDE SERPL-SCNC: 99 MMOL/L (ref 98–107)
CO2 SERPL-SCNC: 21 MMOL/L (ref 20–31)
CREAT SERPL-MCNC: 1.2 MG/DL (ref 0.5–0.9)
ECHO BSA: 2.1 M2
EOSINOPHIL # BLD: <0.03 K/UL (ref 0–0.44)
EOSINOPHILS RELATIVE PERCENT: 0 % (ref 1–4)
ERYTHROCYTE [DISTWIDTH] IN BLOOD BY AUTOMATED COUNT: 13.6 % (ref 11.8–14.4)
EST. AVERAGE GLUCOSE BLD GHB EST-MCNC: 260 MG/DL
GFR SERPL CREATININE-BSD FRML MDRD: 50 ML/MIN/1.73M2
GLUCOSE BLD-MCNC: 399 MG/DL (ref 74–100)
GLUCOSE BLD-MCNC: 429 MG/DL (ref 65–105)
GLUCOSE BLD-MCNC: 435 MG/DL (ref 65–105)
GLUCOSE BLD-MCNC: 448 MG/DL (ref 65–105)
GLUCOSE BLD-MCNC: 458 MG/DL (ref 65–105)
GLUCOSE BLD-MCNC: 464 MG/DL (ref 65–105)
GLUCOSE SERPL-MCNC: 469 MG/DL (ref 74–99)
HBA1C MFR BLD: 10.7 % (ref 4–6)
HCT VFR BLD AUTO: 40.2 % (ref 36.3–47.1)
HGB BLD-MCNC: 13.1 G/DL (ref 11.9–15.1)
IMM GRANULOCYTES # BLD AUTO: 0.08 K/UL (ref 0–0.3)
IMM GRANULOCYTES NFR BLD: 1 %
LYMPHOCYTES NFR BLD: 0.79 K/UL (ref 1.1–3.7)
LYMPHOCYTES RELATIVE PERCENT: 7 % (ref 24–43)
MAGNESIUM SERPL-MCNC: 1.3 MG/DL (ref 1.6–2.4)
MCH RBC QN AUTO: 29.8 PG (ref 25.2–33.5)
MCHC RBC AUTO-ENTMCNC: 32.6 G/DL (ref 28.4–34.8)
MCV RBC AUTO: 91.6 FL (ref 82.6–102.9)
MONOCYTES NFR BLD: 0.11 K/UL (ref 0.1–1.2)
MONOCYTES NFR BLD: 1 % (ref 3–12)
NEUTROPHILS NFR BLD: 91 % (ref 36–65)
NEUTS SEG NFR BLD: 10.46 K/UL (ref 1.5–8.1)
NRBC BLD-RTO: 0 PER 100 WBC
PHOSPHATE SERPL-MCNC: 3.4 MG/DL (ref 2.5–4.5)
PLATELET # BLD AUTO: 200 K/UL (ref 138–453)
PMV BLD AUTO: 11.6 FL (ref 8.1–13.5)
POTASSIUM SERPL-SCNC: 4 MMOL/L (ref 3.7–5.3)
RBC # BLD AUTO: 4.39 M/UL (ref 3.95–5.11)
SODIUM SERPL-SCNC: 135 MMOL/L (ref 136–145)
WBC OTHER # BLD: 11.5 K/UL (ref 3.5–11.3)

## 2024-03-11 PROCEDURE — C1725 CATH, TRANSLUMIN NON-LASER: HCPCS | Performed by: INTERNAL MEDICINE

## 2024-03-11 PROCEDURE — 82947 ASSAY GLUCOSE BLOOD QUANT: CPT

## 2024-03-11 PROCEDURE — 92920 PRQ TRLUML C ANGIOP 1ART&/BR: CPT | Performed by: INTERNAL MEDICINE

## 2024-03-11 PROCEDURE — 92972 PERQ TRLUML CORONRY LITHOTRP: CPT | Performed by: INTERNAL MEDICINE

## 2024-03-11 PROCEDURE — 99152 MOD SED SAME PHYS/QHP 5/>YRS: CPT | Performed by: INTERNAL MEDICINE

## 2024-03-11 PROCEDURE — 36415 COLL VENOUS BLD VENIPUNCTURE: CPT

## 2024-03-11 PROCEDURE — 2580000003 HC RX 258: Performed by: INTERNAL MEDICINE

## 2024-03-11 PROCEDURE — C1760 CLOSURE DEV, VASC: HCPCS | Performed by: INTERNAL MEDICINE

## 2024-03-11 PROCEDURE — 6360000002 HC RX W HCPCS

## 2024-03-11 PROCEDURE — 6360000004 HC RX CONTRAST MEDICATION: Performed by: INTERNAL MEDICINE

## 2024-03-11 PROCEDURE — 92928 PRQ TCAT PLMT NTRAC ST 1 LES: CPT | Performed by: INTERNAL MEDICINE

## 2024-03-11 PROCEDURE — 94640 AIRWAY INHALATION TREATMENT: CPT

## 2024-03-11 PROCEDURE — 83735 ASSAY OF MAGNESIUM: CPT

## 2024-03-11 PROCEDURE — C1769 GUIDE WIRE: HCPCS | Performed by: INTERNAL MEDICINE

## 2024-03-11 PROCEDURE — 2060000000 HC ICU INTERMEDIATE R&B

## 2024-03-11 PROCEDURE — 6370000000 HC RX 637 (ALT 250 FOR IP)

## 2024-03-11 PROCEDURE — C1894 INTRO/SHEATH, NON-LASER: HCPCS | Performed by: INTERNAL MEDICINE

## 2024-03-11 PROCEDURE — 2580000003 HC RX 258

## 2024-03-11 PROCEDURE — 027035Z DILATION OF CORONARY ARTERY, ONE ARTERY WITH TWO DRUG-ELUTING INTRALUMINAL DEVICES, PERCUTANEOUS APPROACH: ICD-10-PCS | Performed by: INTERNAL MEDICINE

## 2024-03-11 PROCEDURE — 83036 HEMOGLOBIN GLYCOSYLATED A1C: CPT

## 2024-03-11 PROCEDURE — C9601 PERC DRUG-EL COR STENT BRAN: HCPCS | Performed by: INTERNAL MEDICINE

## 2024-03-11 PROCEDURE — C1874 STENT, COATED/COV W/DEL SYS: HCPCS | Performed by: INTERNAL MEDICINE

## 2024-03-11 PROCEDURE — C1761 HC CATH TRANSLUM INTRAVASCULAR LITHOTRIPSY CORONARY: HCPCS | Performed by: INTERNAL MEDICINE

## 2024-03-11 PROCEDURE — 80048 BASIC METABOLIC PNL TOTAL CA: CPT

## 2024-03-11 PROCEDURE — 7100000010 HC PHASE II RECOVERY - FIRST 15 MIN: Performed by: INTERNAL MEDICINE

## 2024-03-11 PROCEDURE — 6360000002 HC RX W HCPCS: Performed by: INTERNAL MEDICINE

## 2024-03-11 PROCEDURE — 2709999900 HC NON-CHARGEABLE SUPPLY: Performed by: INTERNAL MEDICINE

## 2024-03-11 PROCEDURE — 2500000003 HC RX 250 WO HCPCS: Performed by: INTERNAL MEDICINE

## 2024-03-11 PROCEDURE — 85025 COMPLETE CBC W/AUTO DIFF WBC: CPT

## 2024-03-11 PROCEDURE — 02F03ZZ FRAGMENTATION IN CORONARY ARTERY, ONE ARTERY, PERCUTANEOUS APPROACH: ICD-10-PCS | Performed by: INTERNAL MEDICINE

## 2024-03-11 PROCEDURE — 6370000000 HC RX 637 (ALT 250 FOR IP): Performed by: INTERNAL MEDICINE

## 2024-03-11 PROCEDURE — 84100 ASSAY OF PHOSPHORUS: CPT

## 2024-03-11 PROCEDURE — 99153 MOD SED SAME PHYS/QHP EA: CPT | Performed by: INTERNAL MEDICINE

## 2024-03-11 PROCEDURE — C1887 CATHETER, GUIDING: HCPCS | Performed by: INTERNAL MEDICINE

## 2024-03-11 PROCEDURE — 7100000011 HC PHASE II RECOVERY - ADDTL 15 MIN: Performed by: INTERNAL MEDICINE

## 2024-03-11 PROCEDURE — C9600 PERC DRUG-EL COR STENT SING: HCPCS | Performed by: INTERNAL MEDICINE

## 2024-03-11 DEVICE — STENT CORONARY ONYX FRONTIER RX 2.25X12 MM ZOTAROLIMUS ELUT: Type: IMPLANTABLE DEVICE | Status: FUNCTIONAL

## 2024-03-11 DEVICE — STENT CORONARY ONYX FRONTIER RX 3X38 MM ZOTAROLIMUS ELUT: Type: IMPLANTABLE DEVICE | Status: FUNCTIONAL

## 2024-03-11 RX ORDER — BUDESONIDE AND FORMOTEROL FUMARATE DIHYDRATE 80; 4.5 UG/1; UG/1
2 AEROSOL RESPIRATORY (INHALATION)
Status: DISCONTINUED | OUTPATIENT
Start: 2024-03-11 | End: 2024-03-14 | Stop reason: HOSPADM

## 2024-03-11 RX ORDER — INSULIN GLARGINE 100 [IU]/ML
25 INJECTION, SOLUTION SUBCUTANEOUS ONCE
Status: DISCONTINUED | OUTPATIENT
Start: 2024-03-11 | End: 2024-03-11

## 2024-03-11 RX ORDER — ESCITALOPRAM OXALATE 10 MG/1
20 TABLET ORAL EVERY MORNING
Status: DISCONTINUED | OUTPATIENT
Start: 2024-03-12 | End: 2024-03-14 | Stop reason: HOSPADM

## 2024-03-11 RX ORDER — DEXTROSE MONOHYDRATE 100 MG/ML
INJECTION, SOLUTION INTRAVENOUS CONTINUOUS PRN
Status: DISCONTINUED | OUTPATIENT
Start: 2024-03-11 | End: 2024-03-14 | Stop reason: HOSPADM

## 2024-03-11 RX ORDER — ONDANSETRON 2 MG/ML
4 INJECTION INTRAMUSCULAR; INTRAVENOUS EVERY 6 HOURS PRN
Status: DISCONTINUED | OUTPATIENT
Start: 2024-03-11 | End: 2024-03-14 | Stop reason: HOSPADM

## 2024-03-11 RX ORDER — INSULIN LISPRO 100 [IU]/ML
0-4 INJECTION, SOLUTION INTRAVENOUS; SUBCUTANEOUS NIGHTLY
Status: DISCONTINUED | OUTPATIENT
Start: 2024-03-11 | End: 2024-03-11

## 2024-03-11 RX ORDER — INSULIN GLARGINE 100 [IU]/ML
20 INJECTION, SOLUTION SUBCUTANEOUS ONCE
Status: COMPLETED | OUTPATIENT
Start: 2024-03-11 | End: 2024-03-11

## 2024-03-11 RX ORDER — OXYCODONE HYDROCHLORIDE AND ACETAMINOPHEN 5; 325 MG/1; MG/1
1 TABLET ORAL EVERY 8 HOURS PRN
Status: DISCONTINUED | OUTPATIENT
Start: 2024-03-11 | End: 2024-03-14 | Stop reason: HOSPADM

## 2024-03-11 RX ORDER — ASPIRIN 81 MG/1
81 TABLET ORAL DAILY
Status: DISCONTINUED | OUTPATIENT
Start: 2024-03-12 | End: 2024-03-14 | Stop reason: HOSPADM

## 2024-03-11 RX ORDER — ASPIRIN 81 MG/1
TABLET, CHEWABLE ORAL PRN
Status: DISCONTINUED | OUTPATIENT
Start: 2024-03-11 | End: 2024-03-11 | Stop reason: HOSPADM

## 2024-03-11 RX ORDER — TOPIRAMATE 100 MG/1
100 TABLET, FILM COATED ORAL NIGHTLY
Status: DISCONTINUED | OUTPATIENT
Start: 2024-03-11 | End: 2024-03-14 | Stop reason: HOSPADM

## 2024-03-11 RX ORDER — INSULIN LISPRO 100 [IU]/ML
10 INJECTION, SOLUTION INTRAVENOUS; SUBCUTANEOUS
Status: DISCONTINUED | OUTPATIENT
Start: 2024-03-11 | End: 2024-03-11

## 2024-03-11 RX ORDER — ACETAMINOPHEN 325 MG/1
650 TABLET ORAL EVERY 6 HOURS PRN
Status: DISCONTINUED | OUTPATIENT
Start: 2024-03-11 | End: 2024-03-14 | Stop reason: HOSPADM

## 2024-03-11 RX ORDER — ENOXAPARIN SODIUM 100 MG/ML
30 INJECTION SUBCUTANEOUS 2 TIMES DAILY
Status: DISCONTINUED | OUTPATIENT
Start: 2024-03-12 | End: 2024-03-14 | Stop reason: HOSPADM

## 2024-03-11 RX ORDER — NITROGLYCERIN 20 MG/100ML
INJECTION INTRAVENOUS PRN
Status: DISCONTINUED | OUTPATIENT
Start: 2024-03-11 | End: 2024-03-11 | Stop reason: HOSPADM

## 2024-03-11 RX ORDER — ACETAMINOPHEN 650 MG/1
650 SUPPOSITORY RECTAL EVERY 6 HOURS PRN
Status: DISCONTINUED | OUTPATIENT
Start: 2024-03-11 | End: 2024-03-14 | Stop reason: HOSPADM

## 2024-03-11 RX ORDER — BUDESONIDE AND FORMOTEROL FUMARATE DIHYDRATE 160; 4.5 UG/1; UG/1
2 AEROSOL RESPIRATORY (INHALATION) 2 TIMES DAILY
Status: ON HOLD | COMMUNITY
End: 2024-03-11

## 2024-03-11 RX ORDER — CHOLECALCIFEROL (VITAMIN D3) 125 MCG
10 CAPSULE ORAL NIGHTLY PRN
Status: DISCONTINUED | OUTPATIENT
Start: 2024-03-11 | End: 2024-03-14 | Stop reason: HOSPADM

## 2024-03-11 RX ORDER — SODIUM CHLORIDE 0.9 % (FLUSH) 0.9 %
5-40 SYRINGE (ML) INJECTION PRN
Status: DISCONTINUED | OUTPATIENT
Start: 2024-03-11 | End: 2024-03-14 | Stop reason: HOSPADM

## 2024-03-11 RX ORDER — MAGNESIUM SULFATE IN WATER 40 MG/ML
2000 INJECTION, SOLUTION INTRAVENOUS PRN
Status: DISCONTINUED | OUTPATIENT
Start: 2024-03-11 | End: 2024-03-14 | Stop reason: HOSPADM

## 2024-03-11 RX ORDER — ATORVASTATIN CALCIUM 40 MG/1
40 TABLET, FILM COATED ORAL DAILY
Status: DISCONTINUED | OUTPATIENT
Start: 2024-03-11 | End: 2024-03-14 | Stop reason: HOSPADM

## 2024-03-11 RX ORDER — SODIUM CHLORIDE 9 MG/ML
INJECTION, SOLUTION INTRAVENOUS PRN
Status: DISCONTINUED | OUTPATIENT
Start: 2024-03-11 | End: 2024-03-14 | Stop reason: HOSPADM

## 2024-03-11 RX ORDER — ACETAMINOPHEN 325 MG/1
650 TABLET ORAL EVERY 4 HOURS PRN
Status: CANCELLED | OUTPATIENT
Start: 2024-03-11

## 2024-03-11 RX ORDER — SODIUM CHLORIDE 0.9 % (FLUSH) 0.9 %
5-40 SYRINGE (ML) INJECTION EVERY 12 HOURS SCHEDULED
Status: DISCONTINUED | OUTPATIENT
Start: 2024-03-11 | End: 2024-03-14 | Stop reason: HOSPADM

## 2024-03-11 RX ORDER — MIDAZOLAM HYDROCHLORIDE 1 MG/ML
INJECTION INTRAMUSCULAR; INTRAVENOUS PRN
Status: DISCONTINUED | OUTPATIENT
Start: 2024-03-11 | End: 2024-03-11 | Stop reason: HOSPADM

## 2024-03-11 RX ORDER — ALBUTEROL SULFATE 2.5 MG/3ML
2.5 SOLUTION RESPIRATORY (INHALATION) EVERY 6 HOURS PRN
COMMUNITY

## 2024-03-11 RX ORDER — MAGNESIUM SULFATE IN WATER 40 MG/ML
2000 INJECTION, SOLUTION INTRAVENOUS ONCE
Status: COMPLETED | OUTPATIENT
Start: 2024-03-11 | End: 2024-03-12

## 2024-03-11 RX ORDER — DIPHENHYDRAMINE HYDROCHLORIDE 50 MG/ML
50 INJECTION INTRAMUSCULAR; INTRAVENOUS ONCE
Status: COMPLETED | OUTPATIENT
Start: 2024-03-11 | End: 2024-03-11

## 2024-03-11 RX ORDER — INSULIN LISPRO 100 [IU]/ML
0-16 INJECTION, SOLUTION INTRAVENOUS; SUBCUTANEOUS
Status: DISCONTINUED | OUTPATIENT
Start: 2024-03-11 | End: 2024-03-11

## 2024-03-11 RX ORDER — SODIUM CHLORIDE 0.9 % (FLUSH) 0.9 %
5-40 SYRINGE (ML) INJECTION EVERY 12 HOURS SCHEDULED
Status: CANCELLED | OUTPATIENT
Start: 2024-03-11

## 2024-03-11 RX ORDER — ONDANSETRON 4 MG/1
4 TABLET, ORALLY DISINTEGRATING ORAL EVERY 8 HOURS PRN
Status: DISCONTINUED | OUTPATIENT
Start: 2024-03-11 | End: 2024-03-14 | Stop reason: HOSPADM

## 2024-03-11 RX ORDER — INSULIN GLARGINE 100 [IU]/ML
20 INJECTION, SOLUTION SUBCUTANEOUS NIGHTLY
Status: DISCONTINUED | OUTPATIENT
Start: 2024-03-11 | End: 2024-03-11

## 2024-03-11 RX ORDER — POLYETHYLENE GLYCOL 3350 17 G/17G
17 POWDER, FOR SOLUTION ORAL DAILY PRN
Status: DISCONTINUED | OUTPATIENT
Start: 2024-03-11 | End: 2024-03-14 | Stop reason: HOSPADM

## 2024-03-11 RX ORDER — SODIUM CHLORIDE 0.9 % (FLUSH) 0.9 %
5-40 SYRINGE (ML) INJECTION PRN
Status: CANCELLED | OUTPATIENT
Start: 2024-03-11

## 2024-03-11 RX ORDER — POTASSIUM CHLORIDE 20 MEQ/1
40 TABLET, EXTENDED RELEASE ORAL PRN
Status: DISCONTINUED | OUTPATIENT
Start: 2024-03-11 | End: 2024-03-14 | Stop reason: HOSPADM

## 2024-03-11 RX ORDER — POTASSIUM CHLORIDE 7.45 MG/ML
10 INJECTION INTRAVENOUS PRN
Status: DISCONTINUED | OUTPATIENT
Start: 2024-03-11 | End: 2024-03-14 | Stop reason: HOSPADM

## 2024-03-11 RX ORDER — SODIUM CHLORIDE 450 MG/100ML
INJECTION, SOLUTION INTRAVENOUS CONTINUOUS
Status: DISCONTINUED | OUTPATIENT
Start: 2024-03-11 | End: 2024-03-12

## 2024-03-11 RX ORDER — INSULIN LISPRO 100 [IU]/ML
0-8 INJECTION, SOLUTION INTRAVENOUS; SUBCUTANEOUS
Status: DISCONTINUED | OUTPATIENT
Start: 2024-03-11 | End: 2024-03-11

## 2024-03-11 RX ORDER — BIVALIRUDIN 250 MG/5ML
INJECTION, POWDER, LYOPHILIZED, FOR SOLUTION INTRAVENOUS PRN
Status: DISCONTINUED | OUTPATIENT
Start: 2024-03-11 | End: 2024-03-11 | Stop reason: HOSPADM

## 2024-03-11 RX ORDER — SODIUM CHLORIDE 9 MG/ML
INJECTION, SOLUTION INTRAVENOUS CONTINUOUS
Status: DISCONTINUED | OUTPATIENT
Start: 2024-03-11 | End: 2024-03-11

## 2024-03-11 RX ORDER — ALBUTEROL SULFATE 2.5 MG/3ML
2.5 SOLUTION RESPIRATORY (INHALATION) EVERY 6 HOURS PRN
Status: DISCONTINUED | OUTPATIENT
Start: 2024-03-11 | End: 2024-03-14 | Stop reason: HOSPADM

## 2024-03-11 RX ORDER — SODIUM CHLORIDE 9 MG/ML
INJECTION, SOLUTION INTRAVENOUS PRN
Status: CANCELLED | OUTPATIENT
Start: 2024-03-11

## 2024-03-11 RX ADMIN — SODIUM CHLORIDE, PRESERVATIVE FREE 10 ML: 5 INJECTION INTRAVENOUS at 22:58

## 2024-03-11 RX ADMIN — DIPHENHYDRAMINE HYDROCHLORIDE 50 MG: 50 INJECTION INTRAMUSCULAR; INTRAVENOUS at 08:41

## 2024-03-11 RX ADMIN — INSULIN LISPRO 10 UNITS: 100 INJECTION, SOLUTION INTRAVENOUS; SUBCUTANEOUS at 18:40

## 2024-03-11 RX ADMIN — INSULIN LISPRO 16 UNITS: 100 INJECTION, SOLUTION INTRAVENOUS; SUBCUTANEOUS at 17:33

## 2024-03-11 RX ADMIN — BUDESONIDE AND FORMOTEROL FUMARATE DIHYDRATE 2 PUFF: 80; 4.5 AEROSOL RESPIRATORY (INHALATION) at 20:36

## 2024-03-11 RX ADMIN — MAGNESIUM SULFATE HEPTAHYDRATE 2000 MG: 40 INJECTION, SOLUTION INTRAVENOUS at 22:20

## 2024-03-11 RX ADMIN — SODIUM CHLORIDE 7.38 UNITS/HR: 9 INJECTION, SOLUTION INTRAVENOUS at 22:40

## 2024-03-11 RX ADMIN — WATER 125 MG: 1 INJECTION INTRAMUSCULAR; INTRAVENOUS; SUBCUTANEOUS at 08:39

## 2024-03-11 RX ADMIN — INSULIN LISPRO 4 UNITS: 100 INJECTION, SOLUTION INTRAVENOUS; SUBCUTANEOUS at 22:21

## 2024-03-11 RX ADMIN — SODIUM CHLORIDE: 9 INJECTION, SOLUTION INTRAVENOUS at 08:37

## 2024-03-11 RX ADMIN — INSULIN GLARGINE 20 UNITS: 100 INJECTION, SOLUTION SUBCUTANEOUS at 17:33

## 2024-03-11 ASSESSMENT — ENCOUNTER SYMPTOMS
BACK PAIN: 1
CONSTIPATION: 1
ABDOMINAL PAIN: 0
ABDOMINAL DISTENTION: 0
DIARRHEA: 0
COUGH: 0
SHORTNESS OF BREATH: 0

## 2024-03-11 NOTE — H&P
[facility]     Department of Internal Medicine - Staff Internal Medicine Teaching Service          ADMISSION NOTE/HISTORY AND PHYSICAL EXAMINATION   Date: 3/11/2024  Patient Name: Mariangel Abreu  Date of admission: 3/11/2024  7:15 AM  YOB: 1950  PCP: Carla Chua APRN - CNP  History Obtained From:  patient, electronic medical record    CHIEF COMPLAINT     Chief complaint: Scheduled cardiac cath    HISTORY OF PRESENTING ILLNESS     The patient is a pleasant 73 y.o. female past medical history of atrial fibrillation, CKD stage III, secondary hypertension, CHF, type 2 diabetes, COPD, hypertension, history of stroke, chronic hypoxic respiratory failure, and follows with pain management for low back pain, and HIGINIO on CPAP presents due to scheduled cardiac cath.  Early last month patient presented to the emergency department with chest pain found to have inferior STEMI.  Patient had cardiac cath at that time with ELEANOR placed to mid RCA.  Mid LAD and proximal first diagonal artery noted to have stenosis.  Patient did not have ELEANOR to LDA at that time and it was determined that the patient will need to undergo staged PCI.    Patient presents today for staged PCI.  Patient does not have any acute complaints today.  Denies active chest pain, shortness of breath, confusion, abdominal pain, or diarrhea.  EKG is normal sinus without any acute changes    Patient underwent successful PCI with shockwave and stenting to proximal and mid LAD.  2 ELEANOR stents placed.    Patient prefers to be admitted to ensure she stays stable postcardiac cath.    Review of Systems   Constitutional:  Negative for chills and fatigue.   Respiratory:  Negative for cough and shortness of breath.    Cardiovascular:  Negative for chest pain and palpitations.   Gastrointestinal:  Positive for constipation. Negative for abdominal distention, abdominal pain and diarrhea.   Genitourinary:  Negative for 
tablet Place 1 tablet under the tongue every 5 minutes as needed for Chest pain up to max of 3 total doses. If no relief after 1 dose, call 911. 2/7/24   Karen Crain APRN - CNP   alogliptin (NESINA) 12.5 MG TABS tablet Take 1 tablet by mouth daily 2/8/24   Karen Crain APRN - CNP   midodrine (PROAMATINE) 2.5 MG tablet Take 1 tablet by mouth 3 times daily (with meals) 2/7/24   Karen Crain APRN - CNP   fluticasone furoate-vilanterol (BREO ELLIPTA) 100-25 MCG/ACT inhaler Inhale 1 puff into the lungs daily 1/26/24   Carla Chua APRN - CNP   escitalopram (LEXAPRO) 20 MG tablet Take 1 tablet by mouth every morning 1/17/24   Carla Chua APRN - CNP   topiramate (TOPAMAX) 100 MG tablet Take 1 tablet by mouth nightly 1/16/24   Prabha Hammond APRN - CNP   Insulin Pen Needle (ULTICARE MINI PEN NEEDLES) 31G X 6 MM MISC Inject 3 times daily 1/9/24   Carla Chua APRN - CNP   metoprolol tartrate (LOPRESSOR) 25 MG tablet Take 0.5 tablets by mouth 2 times daily 1/9/24   Carla Chua APRN - CNP   blood glucose test strips (ONETOUCH ULTRA) strip TEST TWICE A DAY TO 3 TIMES DAILY AS NEEDED 12/29/23   Carla Chua APRN - CNP   Continuous Blood Gluc  (FREESTYLE KHADRA 2 READER) HARMEET Apply  with khadra sensor daily and as needed 10/27/23   Carla Chua APRN - CNP   Continuous Blood Gluc Sensor (FREESTYLE KHADRA 2 SENSOR) MISC Apply to arm once every 2 weeks. 10/27/23   Carla Chua APRN - CNP   albuterol sulfate HFA (VENTOLIN HFA) 108 (90 Base) MCG/ACT inhaler Inhale 2 puffs into the lungs 4 times daily as needed for Wheezing 9/9/23   Vicente Yee MD   oxybutynin (DITROPAN-XL) 5 MG extended release tablet TAKE 1 TABLET BY MOUTH DAILY 8/9/23   Carla Chua, VERONIKA Dalton CNP   Incontinence Supply Disposable MISC Disposable justin pad to change 2 times a day 7/20/23   Carla Chua, VERONIKA - CNP   Incontinence Supplies MISC Disposable incontinence liner pad,

## 2024-03-11 NOTE — PROCEDURES
[facility]        Date:   3/11/2024  Patient name: Mariangel Abreu  Date of admission:  3/11/2024  7:15 AM  MRN:   [id number]  YOB: 1950    PCI to the LAD    Operators:  Primary: Victoriano Dee MD.  Assistant:     Indications for PCI: High grade LAD stenosis.     Procedure performed: PCI to the LAD    Access: Right Femoral artery      Procedure: After informed consent was obtained with explanation of the risks and benefits, patient was brought to the cath lab. The right groin were prepped and draped in sterile fashion. 1% lidocaine was used for local block. The Femoral artery was cannulated with 6  Fr sheath with brisk arterial blood return. The side port was frequently flushed and aspirated with normal saline.    EBL is 10 mL    Findings:      LAD: 80% long proximal stenosis heavily calcified, length is 34 mm, GELACIO-3 flow, underwent shockwave with 2.5 mm balloon followed by stenting with 3.0 x 38 mm Wanette stent with 0% residual stenosis and GELACIO-3 flow    Mid LAD has 80% stenosis length is 8 mm, GELACIO-3 flow, underwent ELEANOR using 2.25 x 12 mm Wanette stent with 0% residual stenosis and GELACIO-3 flow      Conclusions:  Successful PCI with shockwave and stenting to proximal and mid LAD    Recommendation:  Routine post PCI orders  Medical treatments.  Risk factors modifications.        Electronically signed by Victoriano Dee MD on 3/11/2024 at 9:34 AM      [facility]  [phone]

## 2024-03-12 ENCOUNTER — CARE COORDINATION (OUTPATIENT)
Dept: CARE COORDINATION | Age: 74
End: 2024-03-12

## 2024-03-12 LAB
ANION GAP SERPL CALCULATED.3IONS-SCNC: 14 MMOL/L (ref 9–16)
BASOPHILS # BLD: 0.03 K/UL (ref 0–0.2)
BASOPHILS NFR BLD: 0 % (ref 0–2)
BUN SERPL-MCNC: 31 MG/DL (ref 8–23)
CALCIUM SERPL-MCNC: 9.1 MG/DL (ref 8.6–10.4)
CHLORIDE SERPL-SCNC: 98 MMOL/L (ref 98–107)
CO2 SERPL-SCNC: 22 MMOL/L (ref 20–31)
CREAT SERPL-MCNC: 1 MG/DL (ref 0.5–0.9)
EOSINOPHIL # BLD: <0.03 K/UL (ref 0–0.44)
EOSINOPHILS RELATIVE PERCENT: 0 % (ref 1–4)
ERYTHROCYTE [DISTWIDTH] IN BLOOD BY AUTOMATED COUNT: 13.9 % (ref 11.8–14.4)
GFR SERPL CREATININE-BSD FRML MDRD: 56 ML/MIN/1.73M2
GLUCOSE BLD-MCNC: 176 MG/DL (ref 65–105)
GLUCOSE BLD-MCNC: 211 MG/DL (ref 65–105)
GLUCOSE BLD-MCNC: 239 MG/DL (ref 65–105)
GLUCOSE BLD-MCNC: 248 MG/DL (ref 65–105)
GLUCOSE BLD-MCNC: 284 MG/DL (ref 65–105)
GLUCOSE BLD-MCNC: 291 MG/DL (ref 65–105)
GLUCOSE BLD-MCNC: 292 MG/DL (ref 65–105)
GLUCOSE BLD-MCNC: 296 MG/DL (ref 65–105)
GLUCOSE BLD-MCNC: 363 MG/DL (ref 65–105)
GLUCOSE SERPL-MCNC: 305 MG/DL (ref 74–99)
HCT VFR BLD AUTO: 39.2 % (ref 36.3–47.1)
HGB BLD-MCNC: 12.4 G/DL (ref 11.9–15.1)
IMM GRANULOCYTES # BLD AUTO: 0.08 K/UL (ref 0–0.3)
IMM GRANULOCYTES NFR BLD: 1 %
LYMPHOCYTES NFR BLD: 1.38 K/UL (ref 1.1–3.7)
LYMPHOCYTES RELATIVE PERCENT: 13 % (ref 24–43)
MAGNESIUM SERPL-MCNC: 2.5 MG/DL (ref 1.6–2.4)
MCH RBC QN AUTO: 29.9 PG (ref 25.2–33.5)
MCHC RBC AUTO-ENTMCNC: 31.6 G/DL (ref 28.4–34.8)
MCV RBC AUTO: 94.5 FL (ref 82.6–102.9)
MONOCYTES NFR BLD: 1.1 K/UL (ref 0.1–1.2)
MONOCYTES NFR BLD: 10 % (ref 3–12)
NEUTROPHILS NFR BLD: 76 % (ref 36–65)
NEUTS SEG NFR BLD: 8.46 K/UL (ref 1.5–8.1)
NRBC BLD-RTO: 0 PER 100 WBC
PLATELET # BLD AUTO: 189 K/UL (ref 138–453)
PMV BLD AUTO: 11.7 FL (ref 8.1–13.5)
POTASSIUM SERPL-SCNC: 3.8 MMOL/L (ref 3.7–5.3)
RBC # BLD AUTO: 4.15 M/UL (ref 3.95–5.11)
SODIUM SERPL-SCNC: 134 MMOL/L (ref 136–145)
WBC OTHER # BLD: 11.1 K/UL (ref 3.5–11.3)

## 2024-03-12 PROCEDURE — 85025 COMPLETE CBC W/AUTO DIFF WBC: CPT

## 2024-03-12 PROCEDURE — 83735 ASSAY OF MAGNESIUM: CPT

## 2024-03-12 PROCEDURE — 97535 SELF CARE MNGMENT TRAINING: CPT

## 2024-03-12 PROCEDURE — 94660 CPAP INITIATION&MGMT: CPT

## 2024-03-12 PROCEDURE — 97166 OT EVAL MOD COMPLEX 45 MIN: CPT

## 2024-03-12 PROCEDURE — 97530 THERAPEUTIC ACTIVITIES: CPT

## 2024-03-12 PROCEDURE — 80048 BASIC METABOLIC PNL TOTAL CA: CPT

## 2024-03-12 PROCEDURE — 94640 AIRWAY INHALATION TREATMENT: CPT

## 2024-03-12 PROCEDURE — 5A09457 ASSISTANCE WITH RESPIRATORY VENTILATION, 24-96 CONSECUTIVE HOURS, CONTINUOUS POSITIVE AIRWAY PRESSURE: ICD-10-PCS | Performed by: INTERNAL MEDICINE

## 2024-03-12 PROCEDURE — 6360000002 HC RX W HCPCS

## 2024-03-12 PROCEDURE — 82947 ASSAY GLUCOSE BLOOD QUANT: CPT

## 2024-03-12 PROCEDURE — 6370000000 HC RX 637 (ALT 250 FOR IP)

## 2024-03-12 PROCEDURE — 99232 SBSQ HOSP IP/OBS MODERATE 35: CPT | Performed by: INTERNAL MEDICINE

## 2024-03-12 PROCEDURE — 36415 COLL VENOUS BLD VENIPUNCTURE: CPT

## 2024-03-12 PROCEDURE — 6370000000 HC RX 637 (ALT 250 FOR IP): Performed by: STUDENT IN AN ORGANIZED HEALTH CARE EDUCATION/TRAINING PROGRAM

## 2024-03-12 PROCEDURE — 97162 PT EVAL MOD COMPLEX 30 MIN: CPT

## 2024-03-12 PROCEDURE — 97116 GAIT TRAINING THERAPY: CPT

## 2024-03-12 PROCEDURE — 2580000003 HC RX 258

## 2024-03-12 PROCEDURE — 94761 N-INVAS EAR/PLS OXIMETRY MLT: CPT

## 2024-03-12 PROCEDURE — 99233 SBSQ HOSP IP/OBS HIGH 50: CPT

## 2024-03-12 PROCEDURE — 2060000000 HC ICU INTERMEDIATE R&B

## 2024-03-12 RX ORDER — INSULIN LISPRO 100 [IU]/ML
0-4 INJECTION, SOLUTION INTRAVENOUS; SUBCUTANEOUS NIGHTLY
Status: DISCONTINUED | OUTPATIENT
Start: 2024-03-12 | End: 2024-03-14 | Stop reason: HOSPADM

## 2024-03-12 RX ORDER — INSULIN GLARGINE 100 [IU]/ML
45 INJECTION, SOLUTION SUBCUTANEOUS DAILY
Status: DISCONTINUED | OUTPATIENT
Start: 2024-03-12 | End: 2024-03-12

## 2024-03-12 RX ORDER — INSULIN LISPRO 100 [IU]/ML
0-16 INJECTION, SOLUTION INTRAVENOUS; SUBCUTANEOUS
Status: DISCONTINUED | OUTPATIENT
Start: 2024-03-12 | End: 2024-03-14 | Stop reason: HOSPADM

## 2024-03-12 RX ORDER — INSULIN GLARGINE 100 [IU]/ML
45 INJECTION, SOLUTION SUBCUTANEOUS DAILY
Status: DISCONTINUED | OUTPATIENT
Start: 2024-03-12 | End: 2024-03-14 | Stop reason: HOSPADM

## 2024-03-12 RX ORDER — DOCUSATE SODIUM 100 MG/1
100 CAPSULE, LIQUID FILLED ORAL DAILY
Status: DISCONTINUED | OUTPATIENT
Start: 2024-03-12 | End: 2024-03-14 | Stop reason: HOSPADM

## 2024-03-12 RX ORDER — INSULIN GLARGINE 100 [IU]/ML
35 INJECTION, SOLUTION SUBCUTANEOUS NIGHTLY
Status: DISCONTINUED | OUTPATIENT
Start: 2024-03-12 | End: 2024-03-14 | Stop reason: HOSPADM

## 2024-03-12 RX ADMIN — SODIUM CHLORIDE, PRESERVATIVE FREE 10 ML: 5 INJECTION INTRAVENOUS at 10:25

## 2024-03-12 RX ADMIN — METOPROLOL TARTRATE 12.5 MG: 25 TABLET ORAL at 20:55

## 2024-03-12 RX ADMIN — INSULIN LISPRO 4 UNITS: 100 INJECTION, SOLUTION INTRAVENOUS; SUBCUTANEOUS at 19:02

## 2024-03-12 RX ADMIN — DESMOPRESSIN ACETATE 40 MG: 0.2 TABLET ORAL at 00:34

## 2024-03-12 RX ADMIN — SODIUM CHLORIDE, PRESERVATIVE FREE 10 ML: 5 INJECTION INTRAVENOUS at 20:58

## 2024-03-12 RX ADMIN — Medication 10 MG: at 20:56

## 2024-03-12 RX ADMIN — INSULIN LISPRO 8 UNITS: 100 INJECTION, SOLUTION INTRAVENOUS; SUBCUTANEOUS at 12:52

## 2024-03-12 RX ADMIN — ENOXAPARIN SODIUM 30 MG: 100 INJECTION SUBCUTANEOUS at 20:57

## 2024-03-12 RX ADMIN — Medication 10 MG: at 00:34

## 2024-03-12 RX ADMIN — TICAGRELOR 90 MG: 90 TABLET ORAL at 10:24

## 2024-03-12 RX ADMIN — MAGNESIUM SULFATE HEPTAHYDRATE 2000 MG: 40 INJECTION, SOLUTION INTRAVENOUS at 02:23

## 2024-03-12 RX ADMIN — DOCUSATE SODIUM 100 MG: 100 CAPSULE, LIQUID FILLED ORAL at 10:24

## 2024-03-12 RX ADMIN — INSULIN GLARGINE 35 UNITS: 100 INJECTION, SOLUTION SUBCUTANEOUS at 20:56

## 2024-03-12 RX ADMIN — BUDESONIDE AND FORMOTEROL FUMARATE DIHYDRATE 2 PUFF: 80; 4.5 AEROSOL RESPIRATORY (INHALATION) at 20:56

## 2024-03-12 RX ADMIN — OXYCODONE HYDROCHLORIDE AND ACETAMINOPHEN 1 TABLET: 5; 325 TABLET ORAL at 19:02

## 2024-03-12 RX ADMIN — ESCITALOPRAM OXALATE 20 MG: 10 TABLET ORAL at 10:24

## 2024-03-12 RX ADMIN — DESMOPRESSIN ACETATE 40 MG: 0.2 TABLET ORAL at 10:24

## 2024-03-12 RX ADMIN — ENOXAPARIN SODIUM 30 MG: 100 INJECTION SUBCUTANEOUS at 10:24

## 2024-03-12 RX ADMIN — TICAGRELOR 90 MG: 90 TABLET ORAL at 20:55

## 2024-03-12 RX ADMIN — OXYCODONE HYDROCHLORIDE AND ACETAMINOPHEN 1 TABLET: 5; 325 TABLET ORAL at 09:10

## 2024-03-12 RX ADMIN — Medication 81 MG: at 12:52

## 2024-03-12 RX ADMIN — TOPIRAMATE 100 MG: 100 TABLET, FILM COATED ORAL at 20:56

## 2024-03-12 RX ADMIN — METOPROLOL TARTRATE 12.5 MG: 25 TABLET ORAL at 00:34

## 2024-03-12 RX ADMIN — METOPROLOL TARTRATE 12.5 MG: 25 TABLET ORAL at 10:24

## 2024-03-12 RX ADMIN — INSULIN LISPRO 4 UNITS: 100 INJECTION, SOLUTION INTRAVENOUS; SUBCUTANEOUS at 08:47

## 2024-03-12 RX ADMIN — BUDESONIDE AND FORMOTEROL FUMARATE DIHYDRATE 2 PUFF: 80; 4.5 AEROSOL RESPIRATORY (INHALATION) at 08:55

## 2024-03-12 RX ADMIN — INSULIN GLARGINE 45 UNITS: 100 INJECTION, SOLUTION SUBCUTANEOUS at 08:46

## 2024-03-12 RX ADMIN — TICAGRELOR 90 MG: 90 TABLET ORAL at 00:37

## 2024-03-12 ASSESSMENT — PAIN SCALES - GENERAL
PAINLEVEL_OUTOF10: 5
PAINLEVEL_OUTOF10: 8
PAINLEVEL_OUTOF10: 9

## 2024-03-12 ASSESSMENT — PAIN DESCRIPTION - DESCRIPTORS: DESCRIPTORS: ACHING

## 2024-03-12 ASSESSMENT — PAIN DESCRIPTION - LOCATION: LOCATION: BACK

## 2024-03-12 ASSESSMENT — PAIN DESCRIPTION - ORIENTATION: ORIENTATION: LOWER

## 2024-03-12 NOTE — CARE COORDINATION
Ambulatory Care Coordination Note  3/12/2024    Patient Current Location:  Home: [address]  [place] [address]     ACM contacted the patient by telephone. Verified name and  with patient as identifiers. Provided introduction to self, and explanation of the ACM role.     Challenges to be reviewed by the provider   Additional needs identified to be addressed with provider: No  none               Method of communication with provider: none.    ACM: Travon Cervantes, RN    Spoke with patient, states she feels ok. Has surgery scheduled 3/11 for stent placement. She asked if they would keep her overnight and I told her that could be a possibility. She states that she's worried about something happening if she goes home the same day. I explained that if she is uncomfortable that she can certainly ask. I explained that I will be on vacation starting the , but another nurse will be covering and call her the day after discharge.  I will F/U on 3/20    Offered patient enrollment in the Remote Patient Monitoring (RPM) program for in-home monitoring: Yes, patient already enrolled.    Lab Results       None            Care Coordination Interventions    Referral from Primary Care Provider: No  Suggested Interventions and Community Resources  Behavorial Health: In Process  Diabetes Education: In Process  Fall Risk Prevention: In Process  Home Health Services: Completed (Comment: AOOA)  Medi Set or Pill Pack: Not Started  Registered Dietician: Not Started  Transportation Support: Completed  Zone Management Tools: Completed (Comment: HF, COPD, DM right time right care)          Goals Addressed    None         Future Appointments   Date Time Provider Department Center   3/26/2024 10:00 AM Prabha Hammond APRN - CNP [facility] [place]   2024  8:15 AM [facility] CARD REHAB RM 01 [facility] CARDIAC [facility]   2024  1:30 PM Carla Chua APRN - CNP [facility]

## 2024-03-12 NOTE — CARE COORDINATION
Patient is currently at [facility] for stent placement that was completed yesterday. Vitals are stable. Plan to discharge today. Will F/U 3/21    RPM set up

## 2024-03-12 NOTE — CARE COORDINATION
Transitional planning: VM from [facility] requesting a return call.    1420 Phone call to [facility]. They are accepting and will start precert tomorrow.

## 2024-03-12 NOTE — CARE COORDINATION
Case Management Assessment  Initial Evaluation    Date/Time of Evaluation: 3/12/2024 11:10 AM  Assessment Completed by: Stacey Block RN    If patient is discharged prior to next notation, then this note serves as note for discharge by case management.    Patient Name: Mariangel Abreu                   YOB: 1950  Diagnosis: Coronary artery disease with angina pectoris, unspecified vessel or lesion type, unspecified whether native or transplanted heart (HCC) [I25.119]  History of ST elevation myocardial infarction (STEMI) [I25.2]                   Date / Time: 3/11/2024  7:15 AM    Patient Admission Status: Inpatient   Readmission Risk (Low < 19, Mod (19-27), High > 27): Readmission Risk Score: 25.5    Current PCP: Carla Chua APRN - CNP  PCP verified by CM? (P) Yes (JOSE MARIA Dominguez)    Chart Reviewed: Yes      History Provided by: (P) Patient  Patient Orientation: (P) Alert and Oriented, Person, Place, Situation, Self    Patient Cognition: (P) Alert    Hospitalization in the last 30 days (Readmission):  No    If yes, Readmission Assessment in  Navigator will be completed.    Advance Directives:      Code Status: Full Code   Patient's Primary Decision Maker is: (P) Legal Next of Kin    Primary Decision Maker (Active): David Waller - Other Relative - [phone]    Discharge Planning:    Patient lives with: (P) Alone Type of Home: (P) Apartment  Primary Care Giver: (P) Self  Patient Support Systems include: (P) Family Members, Friends/Neighbors   Current Financial resources: (P) Medicare  Current community resources: (P) None  Current services prior to admission: (P) C-pap, Durable Medical Equipment, Oxygen Therapy            Current DME: (P) Cane, Cpap, Home Aerosol, Walker, Shower Chair, Oxygen Therapy (Comment), Other (Comment) (Oxygen w/ [employer] 2-3L/[place], scooter, grab bars in bathroom)            Type of Home Care services:  (P) None    ADLS  Prior functional level: (P)

## 2024-03-13 LAB
ANION GAP SERPL CALCULATED.3IONS-SCNC: 10 MMOL/L (ref 9–17)
BASOPHILS # BLD: 0.06 K/UL (ref 0–0.2)
BASOPHILS NFR BLD: 1 % (ref 0–2)
BUN SERPL-MCNC: 27 MG/DL (ref 8–23)
CALCIUM SERPL-MCNC: 9 MG/DL (ref 8.6–10.4)
CHLORIDE SERPL-SCNC: 104 MMOL/L (ref 98–107)
CO2 SERPL-SCNC: 26 MMOL/L (ref 20–31)
CREAT SERPL-MCNC: 0.9 MG/DL (ref 0.5–0.9)
EOSINOPHIL # BLD: 0.14 K/UL (ref 0–0.44)
EOSINOPHILS RELATIVE PERCENT: 2 % (ref 1–4)
ERYTHROCYTE [DISTWIDTH] IN BLOOD BY AUTOMATED COUNT: 13.9 % (ref 11.8–14.4)
GFR SERPL CREATININE-BSD FRML MDRD: >60 ML/MIN/1.73M2
GLUCOSE BLD-MCNC: 107 MG/DL (ref 65–105)
GLUCOSE BLD-MCNC: 163 MG/DL (ref 65–105)
GLUCOSE BLD-MCNC: 188 MG/DL (ref 65–105)
GLUCOSE BLD-MCNC: 214 MG/DL (ref 65–105)
GLUCOSE SERPL-MCNC: 123 MG/DL (ref 74–99)
HCT VFR BLD AUTO: 40 % (ref 36.3–47.1)
HGB BLD-MCNC: 12.8 G/DL (ref 11.9–15.1)
IMM GRANULOCYTES # BLD AUTO: 0.05 K/UL (ref 0–0.3)
IMM GRANULOCYTES NFR BLD: 1 %
LYMPHOCYTES NFR BLD: 2.16 K/UL (ref 1.1–3.7)
LYMPHOCYTES RELATIVE PERCENT: 31 % (ref 24–43)
MCH RBC QN AUTO: 29.8 PG (ref 25.2–33.5)
MCHC RBC AUTO-ENTMCNC: 32 G/DL (ref 28.4–34.8)
MCV RBC AUTO: 93.2 FL (ref 82.6–102.9)
MONOCYTES NFR BLD: 0.72 K/UL (ref 0.1–1.2)
MONOCYTES NFR BLD: 10 % (ref 3–12)
NEUTROPHILS NFR BLD: 56 % (ref 36–65)
NEUTS SEG NFR BLD: 3.91 K/UL (ref 1.5–8.1)
NRBC BLD-RTO: 0 PER 100 WBC
PLATELET # BLD AUTO: 176 K/UL (ref 138–453)
PMV BLD AUTO: 11.9 FL (ref 8.1–13.5)
POTASSIUM SERPL-SCNC: 4 MMOL/L (ref 3.7–5.3)
RBC # BLD AUTO: 4.29 M/UL (ref 3.95–5.11)
SODIUM SERPL-SCNC: 140 MMOL/L (ref 136–145)
WBC OTHER # BLD: 7 K/UL (ref 3.5–11.3)

## 2024-03-13 PROCEDURE — 85025 COMPLETE CBC W/AUTO DIFF WBC: CPT

## 2024-03-13 PROCEDURE — 2060000000 HC ICU INTERMEDIATE R&B

## 2024-03-13 PROCEDURE — 2580000003 HC RX 258

## 2024-03-13 PROCEDURE — 97535 SELF CARE MNGMENT TRAINING: CPT

## 2024-03-13 PROCEDURE — 82947 ASSAY GLUCOSE BLOOD QUANT: CPT

## 2024-03-13 PROCEDURE — 6370000000 HC RX 637 (ALT 250 FOR IP)

## 2024-03-13 PROCEDURE — 94640 AIRWAY INHALATION TREATMENT: CPT

## 2024-03-13 PROCEDURE — 94761 N-INVAS EAR/PLS OXIMETRY MLT: CPT

## 2024-03-13 PROCEDURE — 36415 COLL VENOUS BLD VENIPUNCTURE: CPT

## 2024-03-13 PROCEDURE — 2700000000 HC OXYGEN THERAPY PER DAY

## 2024-03-13 PROCEDURE — 80048 BASIC METABOLIC PNL TOTAL CA: CPT

## 2024-03-13 PROCEDURE — 6360000002 HC RX W HCPCS

## 2024-03-13 PROCEDURE — 6370000000 HC RX 637 (ALT 250 FOR IP): Performed by: STUDENT IN AN ORGANIZED HEALTH CARE EDUCATION/TRAINING PROGRAM

## 2024-03-13 PROCEDURE — 94660 CPAP INITIATION&MGMT: CPT

## 2024-03-13 RX ORDER — ASPIRIN 81 MG/1
81 TABLET ORAL DAILY
Qty: 30 TABLET | Refills: 3 | Status: SHIPPED | OUTPATIENT
Start: 2024-03-13

## 2024-03-13 RX ADMIN — ESCITALOPRAM OXALATE 20 MG: 10 TABLET ORAL at 08:51

## 2024-03-13 RX ADMIN — ENOXAPARIN SODIUM 30 MG: 100 INJECTION SUBCUTANEOUS at 08:53

## 2024-03-13 RX ADMIN — OXYCODONE HYDROCHLORIDE AND ACETAMINOPHEN 1 TABLET: 5; 325 TABLET ORAL at 08:50

## 2024-03-13 RX ADMIN — ENOXAPARIN SODIUM 30 MG: 100 INJECTION SUBCUTANEOUS at 21:17

## 2024-03-13 RX ADMIN — TICAGRELOR 90 MG: 90 TABLET ORAL at 08:51

## 2024-03-13 RX ADMIN — TICAGRELOR 90 MG: 90 TABLET ORAL at 21:17

## 2024-03-13 RX ADMIN — BUDESONIDE AND FORMOTEROL FUMARATE DIHYDRATE 2 PUFF: 80; 4.5 AEROSOL RESPIRATORY (INHALATION) at 19:27

## 2024-03-13 RX ADMIN — Medication 10 MG: at 21:17

## 2024-03-13 RX ADMIN — INSULIN LISPRO 4 UNITS: 100 INJECTION, SOLUTION INTRAVENOUS; SUBCUTANEOUS at 17:00

## 2024-03-13 RX ADMIN — SODIUM CHLORIDE, PRESERVATIVE FREE 5 ML: 5 INJECTION INTRAVENOUS at 21:30

## 2024-03-13 RX ADMIN — SODIUM CHLORIDE, PRESERVATIVE FREE 10 ML: 5 INJECTION INTRAVENOUS at 10:45

## 2024-03-13 RX ADMIN — Medication 81 MG: at 10:45

## 2024-03-13 RX ADMIN — INSULIN LISPRO 4 UNITS: 100 INJECTION, SOLUTION INTRAVENOUS; SUBCUTANEOUS at 11:07

## 2024-03-13 RX ADMIN — INSULIN GLARGINE 45 UNITS: 100 INJECTION, SOLUTION SUBCUTANEOUS at 08:51

## 2024-03-13 RX ADMIN — OXYCODONE HYDROCHLORIDE AND ACETAMINOPHEN 1 TABLET: 5; 325 TABLET ORAL at 21:20

## 2024-03-13 RX ADMIN — INSULIN GLARGINE 35 UNITS: 100 INJECTION, SOLUTION SUBCUTANEOUS at 21:18

## 2024-03-13 RX ADMIN — TOPIRAMATE 100 MG: 100 TABLET, FILM COATED ORAL at 21:16

## 2024-03-13 RX ADMIN — METOPROLOL TARTRATE 12.5 MG: 25 TABLET ORAL at 21:17

## 2024-03-13 RX ADMIN — BUDESONIDE AND FORMOTEROL FUMARATE DIHYDRATE 2 PUFF: 80; 4.5 AEROSOL RESPIRATORY (INHALATION) at 09:25

## 2024-03-13 RX ADMIN — DESMOPRESSIN ACETATE 40 MG: 0.2 TABLET ORAL at 10:45

## 2024-03-13 ASSESSMENT — PAIN SCALES - GENERAL
PAINLEVEL_OUTOF10: 8
PAINLEVEL_OUTOF10: 4
PAINLEVEL_OUTOF10: 8
PAINLEVEL_OUTOF10: 6

## 2024-03-13 ASSESSMENT — PAIN DESCRIPTION - DESCRIPTORS
DESCRIPTORS: ACHING
DESCRIPTORS: ACHING

## 2024-03-13 ASSESSMENT — PAIN - FUNCTIONAL ASSESSMENT
PAIN_FUNCTIONAL_ASSESSMENT: ACTIVITIES ARE NOT PREVENTED
PAIN_FUNCTIONAL_ASSESSMENT: ACTIVITIES ARE NOT PREVENTED

## 2024-03-13 ASSESSMENT — PAIN DESCRIPTION - LOCATION
LOCATION: BACK
LOCATION: BACK

## 2024-03-13 ASSESSMENT — PAIN DESCRIPTION - ORIENTATION: ORIENTATION: LOWER

## 2024-03-13 NOTE — DISCHARGE INSTR - COC
Continuity of Care Form    Patient Name: Mariangel Abreu   :  1950  MRN:  [id number]    Admit date:  3/11/2024  Discharge date:  3/14/2024    Code Status Order: Full Code   Advance Directives:     Admitting Physician:  Arya yTler MD  PCP: Carla Chua, APRN - CNP    Discharging Nurse: Destiney Sewell  Discharging Hospital Unit/Room#: 0546/0546-01  Discharging Unit Phone Number: [phone]      Emergency Contact:   Extended Emergency Contact Information  Primary Emergency Contact: ENRICO SOL  Address: [facility]  Home Phone: [phone]  Relation: Child  Secondary Emergency Contact: David Waller  Mobile Phone: [phone]  Relation: Other Relative   needed? No    Past Surgical History:  Past Surgical History:   Procedure Laterality Date    ABLATION OF DYSRHYTHMIC FOCUS      CARDIAC PROCEDURE N/A 2024    klarissa / Left heart cath / coronary angiography / stemi er 22 performed by Victoriano Dee MD at [facility] CARDIAC CATH LAB    CARDIAC PROCEDURE N/A 3/11/2024    klarissa / Percutaneous coronary intervention performed by Victoriano Dee MD at [facility] CARDIAC CATH LAB    CARPAL TUNNEL RELEASE Right     CATARACT REMOVAL WITH IMPLANT Bilateral     CHOLECYSTECTOMY      COLONOSCOPY      COLONOSCOPY  04/10/2017    tubular adenomo polyp , mod. sigmoid diverticulosis, min. int. hemorrhoids    COLONOSCOPY N/A 3/2/2021    COLONOSCOPY WITH BIOPSY performed by Moris Brenner MD at [facility] ENDO    CYSTOSCOPY  2013    DILATION AND CURETTAGE  1979    EYE SURGERY      laser    INCONTINENCE SURGERY      Percutaneous nerve stimulation Dr Augie Amaya 2013     INSERTABLE CARDIAC MONITOR  2015    [employer] REVEAL LINQ MODEL #[id number] MRI CONDTIONAL OK 3T. IMMEDIATELY POST IMPLANT    OTHER SURGICAL HISTORY  09/10/15    removal of interstim    MI COLSC FLX W/REMOVAL LESION BY HOT BX FORCEPS N/A 4/10/2017    COLONOSCOPY POLYPECTOMY HOT BIOPSY performed by Ksenia Marroquin MD at [facility]

## 2024-03-13 NOTE — CARE COORDINATION
Transitional planning: Phone call from [facility] with [facility]. Precert was started this morning and is pending.

## 2024-03-13 NOTE — DISCHARGE INSTRUCTIONS
If you begin to experience any symptoms such as chest pain shortness of breath nausea vomiting dizziness drowsiness abdominal pain loss of consciousness or any other symptoms you find concerning please return to the ED for follow-up evaluation.  If you have been given medication please take them as prescribed. Do not take more medication than recommended at any given time. Finish all antibiotic  Please follow-up with your primary care provider within 5 to 7 days for continued care.   Please feel free return to the hospital if your symptoms worsen or any new concerning symptoms develop.  Follow-up with your primary care physician as needed for all other the concerns.

## 2024-03-14 VITALS
WEIGHT: 221.78 LBS | TEMPERATURE: 97.7 F | HEIGHT: 62 IN | RESPIRATION RATE: 18 BRPM | HEART RATE: 69 BPM | OXYGEN SATURATION: 95 % | SYSTOLIC BLOOD PRESSURE: 105 MMHG | BODY MASS INDEX: 40.81 KG/M2 | DIASTOLIC BLOOD PRESSURE: 72 MMHG

## 2024-03-14 LAB
ANION GAP SERPL CALCULATED.3IONS-SCNC: 11 MMOL/L (ref 9–16)
BASOPHILS # BLD: 0.08 K/UL (ref 0–0.2)
BASOPHILS NFR BLD: 1 % (ref 0–2)
BUN SERPL-MCNC: 26 MG/DL (ref 8–23)
CALCIUM SERPL-MCNC: 9.1 MG/DL (ref 8.6–10.4)
CHLORIDE SERPL-SCNC: 103 MMOL/L (ref 98–107)
CO2 SERPL-SCNC: 26 MMOL/L (ref 20–31)
CREAT SERPL-MCNC: 1 MG/DL (ref 0.5–0.9)
EOSINOPHIL # BLD: 0.32 K/UL (ref 0–0.44)
EOSINOPHILS RELATIVE PERCENT: 4 % (ref 1–4)
ERYTHROCYTE [DISTWIDTH] IN BLOOD BY AUTOMATED COUNT: 13.8 % (ref 11.8–14.4)
GFR SERPL CREATININE-BSD FRML MDRD: 58 ML/MIN/1.73M2
GLUCOSE BLD-MCNC: 104 MG/DL (ref 65–105)
GLUCOSE BLD-MCNC: 182 MG/DL (ref 65–105)
GLUCOSE BLD-MCNC: 298 MG/DL (ref 65–105)
GLUCOSE SERPL-MCNC: 119 MG/DL (ref 74–99)
HCT VFR BLD AUTO: 40.5 % (ref 36.3–47.1)
HGB BLD-MCNC: 12.7 G/DL (ref 11.9–15.1)
IMM GRANULOCYTES # BLD AUTO: 0.05 K/UL (ref 0–0.3)
IMM GRANULOCYTES NFR BLD: 1 %
LYMPHOCYTES NFR BLD: 2.25 K/UL (ref 1.1–3.7)
LYMPHOCYTES RELATIVE PERCENT: 29 % (ref 24–43)
MCH RBC QN AUTO: 29.7 PG (ref 25.2–33.5)
MCHC RBC AUTO-ENTMCNC: 31.4 G/DL (ref 28.4–34.8)
MCV RBC AUTO: 94.8 FL (ref 82.6–102.9)
MONOCYTES NFR BLD: 0.81 K/UL (ref 0.1–1.2)
MONOCYTES NFR BLD: 11 % (ref 3–12)
NEUTROPHILS NFR BLD: 54 % (ref 36–65)
NEUTS SEG NFR BLD: 4.22 K/UL (ref 1.5–8.1)
NRBC BLD-RTO: 0 PER 100 WBC
PLATELET # BLD AUTO: 176 K/UL (ref 138–453)
PMV BLD AUTO: 11.5 FL (ref 8.1–13.5)
POTASSIUM SERPL-SCNC: 4 MMOL/L (ref 3.7–5.3)
RBC # BLD AUTO: 4.27 M/UL (ref 3.95–5.11)
SODIUM SERPL-SCNC: 140 MMOL/L (ref 136–145)
WBC OTHER # BLD: 7.7 K/UL (ref 3.5–11.3)

## 2024-03-14 PROCEDURE — 94660 CPAP INITIATION&MGMT: CPT

## 2024-03-14 PROCEDURE — 94640 AIRWAY INHALATION TREATMENT: CPT

## 2024-03-14 PROCEDURE — 2580000003 HC RX 258

## 2024-03-14 PROCEDURE — 82947 ASSAY GLUCOSE BLOOD QUANT: CPT

## 2024-03-14 PROCEDURE — 6360000002 HC RX W HCPCS

## 2024-03-14 PROCEDURE — 6370000000 HC RX 637 (ALT 250 FOR IP)

## 2024-03-14 PROCEDURE — 85025 COMPLETE CBC W/AUTO DIFF WBC: CPT

## 2024-03-14 PROCEDURE — 36415 COLL VENOUS BLD VENIPUNCTURE: CPT

## 2024-03-14 PROCEDURE — 80048 BASIC METABOLIC PNL TOTAL CA: CPT

## 2024-03-14 PROCEDURE — 94761 N-INVAS EAR/PLS OXIMETRY MLT: CPT

## 2024-03-14 PROCEDURE — 6370000000 HC RX 637 (ALT 250 FOR IP): Performed by: STUDENT IN AN ORGANIZED HEALTH CARE EDUCATION/TRAINING PROGRAM

## 2024-03-14 PROCEDURE — 2700000000 HC OXYGEN THERAPY PER DAY

## 2024-03-14 RX ORDER — OXYCODONE HYDROCHLORIDE AND ACETAMINOPHEN 5; 325 MG/1; MG/1
1 TABLET ORAL EVERY 8 HOURS PRN
Qty: 21 TABLET | Refills: 0 | Status: SHIPPED | OUTPATIENT
Start: 2024-03-14 | End: 2024-03-21

## 2024-03-14 RX ADMIN — INSULIN LISPRO 8 UNITS: 100 INJECTION, SOLUTION INTRAVENOUS; SUBCUTANEOUS at 11:34

## 2024-03-14 RX ADMIN — OXYCODONE HYDROCHLORIDE AND ACETAMINOPHEN 1 TABLET: 5; 325 TABLET ORAL at 11:34

## 2024-03-14 RX ADMIN — TICAGRELOR 90 MG: 90 TABLET ORAL at 08:46

## 2024-03-14 RX ADMIN — INSULIN GLARGINE 45 UNITS: 100 INJECTION, SOLUTION SUBCUTANEOUS at 08:56

## 2024-03-14 RX ADMIN — SODIUM CHLORIDE, PRESERVATIVE FREE 10 ML: 5 INJECTION INTRAVENOUS at 08:41

## 2024-03-14 RX ADMIN — DOCUSATE SODIUM 100 MG: 100 CAPSULE, LIQUID FILLED ORAL at 08:45

## 2024-03-14 RX ADMIN — ENOXAPARIN SODIUM 30 MG: 100 INJECTION SUBCUTANEOUS at 08:55

## 2024-03-14 RX ADMIN — METOPROLOL TARTRATE 12.5 MG: 25 TABLET ORAL at 08:46

## 2024-03-14 RX ADMIN — DESMOPRESSIN ACETATE 40 MG: 0.2 TABLET ORAL at 08:45

## 2024-03-14 RX ADMIN — BUDESONIDE AND FORMOTEROL FUMARATE DIHYDRATE 2 PUFF: 80; 4.5 AEROSOL RESPIRATORY (INHALATION) at 09:23

## 2024-03-14 RX ADMIN — ESCITALOPRAM OXALATE 20 MG: 10 TABLET ORAL at 08:45

## 2024-03-14 RX ADMIN — Medication 81 MG: at 08:44

## 2024-03-14 ASSESSMENT — PAIN - FUNCTIONAL ASSESSMENT: PAIN_FUNCTIONAL_ASSESSMENT: ACTIVITIES ARE NOT PREVENTED

## 2024-03-14 ASSESSMENT — PAIN DESCRIPTION - LOCATION: LOCATION: BACK

## 2024-03-14 ASSESSMENT — PAIN DESCRIPTION - ORIENTATION: ORIENTATION: MID;LOWER

## 2024-03-14 ASSESSMENT — PAIN SCALES - GENERAL: PAINLEVEL_OUTOF10: 8

## 2024-03-14 ASSESSMENT — PAIN DESCRIPTION - DESCRIPTORS: DESCRIPTORS: ACHING;SORE

## 2024-03-14 NOTE — PROGRESS NOTES
[facility]  Internal Medicine Teaching Residency Program  Inpatient Daily Progress Note  ______________________________________________________________________________    Patient: Mariangel Abreu  YOB: 1950   MRN:[id number]    Acct: [id number]     Room: 0546/0546-01  Admit date: 3/11/2024  Today's date: 03/13/24  Number of days in the hospital: 2    SUBJECTIVE   Admitting Diagnosis: History of ST elevation myocardial infarction (STEMI)  CC: scheduled cath    - Pt examined at bedside. Chart & results reviewed.   - pt's blood sugar was uncontrolled overnight requiring insulin drip   - patient requesting discharge to facility instead of home now   - no new complaints     Review of Systems   Constitutional:  Negative for chills and fatigue.   Respiratory:  Negative for cough and shortness of breath.    Cardiovascular:  Negative for chest pain and palpitations.   Gastrointestinal:  Positive for constipation. Negative for abdominal distention, abdominal pain and diarrhea.   Genitourinary:  Negative for dysuria.   Musculoskeletal:  Positive for back pain (chronic).   Neurological:  Negative for light-headedness and headaches.   Psychiatric/Behavioral:  Negative for agitation and confusion   BRIEF HISTORY     73 y.o. female past medical history of atrial fibrillation, CKD stage III, secondary hypertension, CHF, type 2 diabetes, COPD, hypertension, history of stroke, chronic hypoxic respiratory failure, and follows with pain management for low back pain, and HIGINIO on CPAP presents due to scheduled cardiac cath.  Early last month patient presented to the emergency department with chest pain found to have inferior STEMI.  Patient had cardiac cath at that time with ELEANOR placed to mid RCA.  Mid LAD and proximal first diagonal artery noted to have stenosis.  Patient did not have ELEANOR to LDA at that time and it was determined that the patient will need to undergo staged 
  [facility]  Progress Note                   Date:   3/12/2024  Patient name: Mariangel Abreu  Date of admission:  3/11/2024  7:15 AM  MRN:   [id number]  YOB: 1950  PCP: Carla Chua, VERONIKA - CNP    Reason for Admission: Coronary artery disease with angina pectoris, unspecified vessel or lesion type, unspecified whether native or transplanted heart (HCC) [I25.119]  History of ST elevation myocardial infarction (STEMI) [I25.2]    Subjective:       Clinical Changes /Abnormalities:Patient seen and examined in room. Denies CP or SOB. No acute CV events overnight. Labs, vitals, and tele reviewed.   Up to chair. Ambulating in room. On RA .     Review of Systems    Medications:   Scheduled Meds:   docusate sodium  100 mg Oral Daily    insulin glargine  45 Units SubCUTAneous Daily    insulin lispro  0-16 Units SubCUTAneous TID WC    insulin lispro  0-4 Units SubCUTAneous Nightly    insulin glargine  35 Units SubCUTAneous Nightly    ticagrelor  90 mg Oral BID    aspirin  81 mg Oral Daily    sodium chloride flush  5-40 mL IntraVENous 2 times per day    enoxaparin  30 mg SubCUTAneous BID    atorvastatin  40 mg Oral Daily    escitalopram  20 mg Oral QAM    topiramate  100 mg Oral Nightly    metoprolol tartrate  12.5 mg Oral BID    budesonide-formoterol  2 puff Inhalation BID RT     Continuous Infusions:   sodium chloride      dextrose      dextrose       CBC:   Recent Labs     03/11/24 2041 03/12/24  1145   WBC 11.5* 11.1   HGB 13.1 12.4    189     BMP:    Recent Labs     03/11/24 2041 03/12/24  1145   * 134*   K 4.0 3.8   CL 99 98   CO2 21 22   BUN 27* 31*   CREATININE 1.2* 1.0*   GLUCOSE 469* 305*     Hepatic:No results for input(s): \"AST\", \"ALT\", \"ALB\", \"BILITOT\", \"ALKPHOS\" in the last 72 hours.  Troponin: No results for input(s): \"TROPHS\" in the last 72 hours.  BNP: No results for input(s): \"BNP\" in the last 72 hours.  Lipids: No results for input(s): \"CHOL\", \"HDL\" in 
Groin assessed in room by farrah Gilbert or vishnu to  electric chair in [facility] 2  
Occupational Therapy  [facility] [address] [facility]  [facility]upational Therapy Tx Session  Name: Mariangel Abreu  : 1950  MRN: [id number]  Date of Service: 3/13/2024    Discharge Recommendations:  Patient would benefit from continued therapy after discharge  OT Equipment Recommendations  Other: TBD       Patient Diagnosis(es): The encounter diagnosis was Coronary artery disease with angina pectoris, unspecified vessel or lesion type, unspecified whether native or transplanted heart (HCC).  Past Medical History:  has a past medical history of Abdominal bloating, Adenomatous polyp of ascending colon, Allergic rhinitis, Anxiety, Arthritis, Asthma, Atrial fibrillation ([facility]), Back pain, Benign hypertension with CKD (chronic kidney disease) stage III ([facility]), CAD (coronary artery disease), Caffeine use, CHF (congestive heart failure) ([facility]), Chronic kidney disease, CKD (chronic kidney disease) stage 3, GFR 30-59 ml/min ([facility]), COPD (chronic obstructive pulmonary disease) ([facility]), Degeneration of lumbar or lumbosacral intervertebral disc, Depression, DM (diabetes mellitus) ([facility]), Emphysema of lung ([facility]), Gastritis, GERD (gastroesophageal reflux disease), Hearing loss, Hematuria, Hiatal hernia, History of loop recorder, HTN (hypertension), benign, Hypertriglyceridemia, Incontinence of urine, Irritable bowel syndrome with both constipation and diarrhea, Kidney stone, Knee arthropathy, Long term (current) use of anticoagulants, Lumbosacral spondylosis without myelopathy, Migraine headache, Mumps, Neuropathy, Obesity, On home oxygen therapy, HIGINIO on CPAP, Otitis media, Secondary diabetes mellitus with stage 3 chronic kidney disease (GFR 30-59) ([facility]), Stroke ([facility]), Tinnitus, Type 2 diabetes mellitus without complication ([facility]), Type II or unspecified type diabetes mellitus without mention of complication, not stated as uncontrolled, UTI (lower urinary tract infection), and Varicose vein.  Past Surgical History:  has a past 
PATIENT RETURNED TO room. Post cath recovery initiated. Patient sleepy but responds appropriately    Right groin with dressing intact. No hematoma or drainage noted.    Patient resting flat in bed.    Side rail sup times 2, call light with in reach. Patinet closely monitored.  
Patient admitted, consent signed and questions answered. Patient ready for procedure. Call light to reach with side rails up 2 of 2. bilateral clipped with writer and Vickie present.  family at bedside with patient.  History and physical updated.  
Patient ate meal without difficulty  
Patient voided per bedpan without difficulty.   HOB elevated per writer.    Right groin remains without hematoma or drainage.  
Physical Therapy  Facility/Department: [address] [facility]  Physical Therapy Initial Assessment    Name: Mariangel Abreu  : 1950  MRN: [id number]  Date of Service: 3/12/2024    No chief complaint on file.        Per EMR:       73 y.o. female Dx: Coronary artery disease with angina pectoris, unspecified vessel or lesion type, unspecified whether native or transplanted hear. Residual stenosis of the LAD S/P Successful PCI with shockwave and stenting to proximal and mid LAD 3/11/24       Discharge Recommendations:  Therapy recommended at discharge   PT Equipment Recommendations  Equipment Needed: No  Other: Pt report no DME needs      Patient Diagnosis(es): The encounter diagnosis was Coronary artery disease with angina pectoris, unspecified vessel or lesion type, unspecified whether native or transplanted heart (HCC).  Past Medical History:  has a past medical history of Abdominal bloating, Adenomatous polyp of ascending colon, Allergic rhinitis, Anxiety, Arthritis, Asthma, Atrial fibrillation ([facility]), Back pain, Benign hypertension with CKD (chronic kidney disease) stage III ([facility]), CAD (coronary artery disease), Caffeine use, CHF (congestive heart failure) ([facility]), Chronic kidney disease, CKD (chronic kidney disease) stage 3, GFR 30-59 ml/min ([facility]), COPD (chronic obstructive pulmonary disease) ([facility]), Degeneration of lumbar or lumbosacral intervertebral disc, Depression, DM (diabetes mellitus) ([facility]), Emphysema of lung ([facility]), Gastritis, GERD (gastroesophageal reflux disease), Hearing loss, Hematuria, Hiatal hernia, History of loop recorder, HTN (hypertension), benign, Hypertriglyceridemia, Incontinence of urine, Irritable bowel syndrome with both constipation and diarrhea, Kidney stone, Knee arthropathy, Long term (current) use of anticoagulants, Lumbosacral spondylosis without myelopathy, Migraine headache, Mumps, Neuropathy, Obesity, On home oxygen therapy, HIGINIO on CPAP, Otitis media, Secondary diabetes mellitus 
Report given to Danis ALEXIS per writer.    Patient assessment unchanged from before. No hematoma or drainage noted to right groin  
[facility]    Admission Medication Reconciliation       The patient's list of current home medications has been reviewed.     Source(s) of information: Dispense Report, Provider Notes     Based on information provided by the above source(s), I have updated the patient's home med list as described below.     Please review the ACTION REQUESTED section of this note below for any discrepancies on current hospital orders.      I changed or updated the following medications on the patient's home medication list:  Removed N/A   Added N/A   Adjusted N/A   Other Notes Flagged For Provider Review:  Alogliptin 12.5 mg Tablets  Clotrimazole-Betamethasone 1-0.05% Cream   Dicyclomine 10 mg Capsule   Ferrous Sulfate 325 mg Tablets   Aspirin EC 81 mg Tablets        PROVIDER ACTION REQUESTED  Medications that need to be addressed by a physician/nurse practitioner:    Medication Action Requested   Flagged Medications For Provider Review Please review the flagged medications and notes regarding fill history; verify if the patient is currently taking and remove from home medication list as necessary.          Please feel free to call me with any questions about this encounter. Thank you.    Thank you  Sarahy Duke, PharmD, [facility]  Inpatient Clinical Pharmacist  [phone]      Electronically signed by Susie Yancey on 3/11/2024 at 10:32 AM   
  Patient presents today for staged PCI.  Patient does not have any acute complaints today.  Denies active chest pain, shortness of breath, confusion, abdominal pain, or diarrhea.  EKG is normal sinus without any acute changes     Patient underwent successful PCI with shockwave and stenting to proximal and mid LAD.  2 ELEANOR stents placed.     Patient prefers to be admitted to ensure she stays stable postcardiac cath    OBJECTIVE     Vital Signs:  /65   Pulse 74   Temp 98.2 °F (36.8 °C) (Oral)   Resp 20   Ht 1.575 m (5' 2\")   Wt 100.6 kg (221 lb 12.5 oz)   LMP  (LMP Unknown)   SpO2 95%   BMI 40.56 kg/m²     Temp (24hrs), Av.8 °F (36.6 °C), Min:97.3 °F (36.3 °C), Max:98.2 °F (36.8 °C)    In: -   Out: 1850 [Urine:1850]    Physical Exam  Constitutional:       General: She is not in acute distress.     Appearance: Normal appearance. She is obese. She is not ill-appearing.   Eyes:      General:         Right eye: No discharge.         Left eye: No discharge.   Cardiovascular:      Rate and Rhythm: Normal rate and regular rhythm.      Pulses: Normal pulses.   Pulmonary:      Effort: Pulmonary effort is normal. No respiratory distress.      Breath sounds: Normal breath sounds. No wheezing.   Abdominal:      General: Bowel sounds are normal. There is no distension.      Palpations: There is no mass.      Tenderness: There is no abdominal tenderness.   Musculoskeletal:      Right lower leg: No edema.      Left lower leg: No edema.   Skin:     Capillary Refill: Capillary refill takes less than 2 seconds.   Neurological:      General: No focal deficit present.      Mental Status: She is alert and oriented to person, place, and time. Mental status is at baseline.      Cranial Nerves: No cranial nerve deficit.      Sensory: No sensory deficit.      Motor: No weakness.   Psychiatric:         Mood and Affect: Mood normal.         Behavior: Behavior normal.         Thought Content: Thought content normal    
Goals: Pt will by d/c  Short Term Goal 1: demo good safety awareness during func mob around room using LRD PRN at mod I  Short Term Goal 2: demo ADL UB bathing/dressing activity at (I)  Short Term Goal 3: demo ADL LB bathing/dressing activity at mod I with increased time, AE PRN  Short Term Goal 4: demo all bed mobility, including rolling, using bedrails PRN at mod I  Short Term Goal 5: demo activity tolerance for 35 min+     Therapy Time   Individual Concurrent Group Co-treatment   Time In 0825         Time Out 0857         Minutes 32         Timed Code Treatment Minutes: 27 Minutes       Sonny Mtz, OTR/L     
staff in detail , all questions answered .   Electronically signed by Deniz Singh MD on   3/12/24 at 12:35 PM EDT    Please note that this chart was generated using voice recognition Dragon dictation software.  Although every effort was made to ensure the accuracy of this automated transcription, some errors in transcription may have occurred.

## 2024-03-14 NOTE — CARE COORDINATION
Received voicemail from [facility] that they have obtained precert.  Transportation arranged for 2pm per Stephen at [facility].  Patient updated, agreeable.  [facility] updated.  REUBEN Cabello updated and given number for report    Discharge Report    [facility]  Clinical Case Management Department  Written by: REILLY HOUSTON RN    Patient Name: Mariangel Abreu  Attending Provider: Arya Tyler MD  Admit Date: 3/11/2024  7:15 AM  MRN: [id number]  Account: [id number]                     : 1950  Discharge Date:   3/14/2024      Disposition: [facility]    REILLY HOUSTON RN

## 2024-03-14 NOTE — PLAN OF CARE
Problem: Chronic Conditions and Co-morbidities  Goal: Patient's chronic conditions and co-morbidity symptoms are monitored and maintained or improved  3/13/2024 0436 by Dulce Wen RN  Outcome: Progressing     Problem: Skin/Tissue Integrity  Goal: Absence of new skin breakdown  Description: 1.  Monitor for areas of redness and/or skin breakdown  2.  Assess vascular access sites hourly  3.  Every 4-6 hours minimum:  Change oxygen saturation probe site  4.  Every 4-6 hours:  If on nasal continuous positive airway pressure, respiratory therapy assess nares and determine need for appliance change or resting period.  3/13/2024 0436 by Dulce Wen RN  Outcome: Progressing     Problem: Safety - Adult  Goal: Free from fall injury  3/13/2024 0436 by Dulce Wen RN  Outcome: Progressing     Problem: Discharge Planning  Goal: Discharge to home or other facility with appropriate resources  3/13/2024 0436 by Dulce Wen RN  Outcome: Progressing     Problem: Respiratory - Adult  Goal: Achieves optimal ventilation and oxygenation  3/13/2024 0436 by Dulce Wen RN  Outcome: Progressing    Problem: Musculoskeletal - Adult  Goal: Return mobility to safest level of function  3/13/2024 0436 by Dulce Wen RN  Outcome: Progressing    Goal: Return ADL status to a safe level of function  3/13/2024 0436 by Dulce Wen RN  Outcome: Progressing     Problem: Metabolic/Fluid and Electrolytes - Adult  Goal: Electrolytes maintained within normal limits  3/13/2024 0436 by Dulce Wen RN  Outcome: Progressing    Goal: Hemodynamic stability and optimal renal function maintained  3/13/2024 0436 by Dulce Wen RN  Outcome: Progressing    Goal: Glucose maintained within prescribed range  3/13/2024 0436 by Dulce Wen RN  Outcome: Progressing     Problem: ABCDS Injury Assessment  Goal: Absence of physical injury  3/13/2024 0436 by Dulce Wen RN  Outcome: Progressing       
  Problem: Chronic Conditions and Co-morbidities  Goal: Patient's chronic conditions and co-morbidity symptoms are monitored and maintained or improved  Outcome: Progressing     Problem: Skin/Tissue Integrity  Goal: Absence of new skin breakdown  Description: 1.  Monitor for areas of redness and/or skin breakdown  2.  Assess vascular access sites hourly  3.  Every 4-6 hours minimum:  Change oxygen saturation probe site  4.  Every 4-6 hours:  If on nasal continuous positive airway pressure, respiratory therapy assess nares and determine need for appliance change or resting period.  Outcome: Progressing     Problem: Safety - Adult  Goal: Free from fall injury  Outcome: Progressing     Problem: Discharge Planning  Goal: Discharge to home or other facility with appropriate resources  Outcome: Progressing     Problem: Respiratory - Adult  Goal: Achieves optimal ventilation and oxygenation  3/12/2024 0705 by Dolores Marshall RN  Outcome: Progressing  3/11/2024 2042 by Kanika Bailey RCP  Outcome: Progressing     
  Problem: Chronic Conditions and Co-morbidities  Goal: Patient's chronic conditions and co-morbidity symptoms are monitored and maintained or improved  Outcome: Progressing     Problem: Skin/Tissue Integrity  Goal: Absence of new skin breakdown  Description: 1.  Monitor for areas of redness and/or skin breakdown  2.  Assess vascular access sites hourly  3.  Every 4-6 hours minimum:  Change oxygen saturation probe site  4.  Every 4-6 hours:  If on nasal continuous positive airway pressure, respiratory therapy assess nares and determine need for appliance change or resting period.  Outcome: Progressing     Problem: Safety - Adult  Goal: Free from fall injury  Outcome: Progressing     Problem: Discharge Planning  Goal: Discharge to home or other facility with appropriate resources  Outcome: Progressing     Problem: Respiratory - Adult  Goal: Achieves optimal ventilation and oxygenation  3/14/2024 0441 by Dulce Wen RN  Outcome: Progressing  3/13/2024 1934 by Mane George RCP  Outcome: Progressing     Problem: Musculoskeletal - Adult  Goal: Return mobility to safest level of function  Outcome: Progressing  Goal: Return ADL status to a safe level of function  Outcome: Progressing     Problem: Metabolic/Fluid and Electrolytes - Adult  Goal: Electrolytes maintained within normal limits  Outcome: Progressing  Goal: Hemodynamic stability and optimal renal function maintained  Outcome: Progressing  Goal: Glucose maintained within prescribed range  Outcome: Progressing     Problem: ABCDS Injury Assessment  Goal: Absence of physical injury  Outcome: Progressing     
  Problem: Respiratory - Adult  Goal: Achieves optimal ventilation and oxygenation  3/12/2024 2117 by Becca Ricci RCP  Outcome: Progressing   BRONCHOSPASM/BRONCHOCONSTRICTION     [x]         IMPROVE AERATION/BREATH SOUNDS  [x]   ADMINISTER BRONCHODILATOR THERAPY AS APPROPRIATE  [x]   ASSESS BREATH SOUNDS  []   IMPLEMENT AEROSOL/MDI PROTOCOL  [x]   PATIENT EDUCATION AS NEEDED  NONINVASIVE VENTILATION    PROVIDE OPTIMAL VENTILATION/ACCEPTABLE SPO2   IMPLEMENT NONINVASIVE VENTILATION PROTOCOL   MAINTAIN ACCEPTABLE SPO2   ASSESS SKIN INTEGRITY/BREAKDOWN SCORE   PATIENT EDUCATION AS NEEDED   BIPAP AS NEEDED        
  Problem: Respiratory - Adult  Goal: Achieves optimal ventilation and oxygenation  Outcome: Progressing     
  Problem: Respiratory - Adult  Goal: Achieves optimal ventilation and oxygenation  Outcome: Progressing   BRONCHOSPASM/BRONCHOCONSTRICTION     [x]         IMPROVE AERATION/BREATH SOUNDS  [x]   ADMINISTER BRONCHODILATOR THERAPY AS APPROPRIATE  [x]   ASSESS BREATH SOUNDS  []   IMPLEMENT AEROSOL/MDI PROTOCOL  [x]   PATIENT EDUCATION AS NEEDED  NON INVASIVE VENTILATION  PROVIDE OPTIMAL VENTILATION/ACCEPTABLE SP02  IMPLEMENT NON INVASIVE VENTILATION PROTOCOL  ASSESSMENT SKIN INTEGRITY  PATIENT EDUCATION AS NEEDED  BIPAP AS NEEDED  
RN  Outcome: Progressing  Flowsheets (Taken 3/14/2024 0700)  Return Mobility to Safest Level of Function:   Assess patient stability and activity tolerance for standing, transferring and ambulating with or without assistive devices   Assist with transfers and ambulation using safe patient handling equipment as needed  3/14/2024 0441 by Dulce Wen RN  Outcome: Progressing  Goal: Return ADL status to a safe level of function  3/14/2024 1256 by Destiney Sewell RN  Outcome: Progressing  Flowsheets (Taken 3/14/2024 0700)  Return ADL Status to a Safe Level of Function:   Administer medication as ordered   Assess activities of daily living deficits and provide assistive devices as needed   Obtain physical therapy/occupational therapy consults as needed  3/14/2024 0441 by Dulce Wen RN  Outcome: Progressing     Problem: Metabolic/Fluid and Electrolytes - Adult  Goal: Electrolytes maintained within normal limits  3/14/2024 1256 by Destiney Sewell RN  Outcome: Progressing  3/14/2024 0441 by Dulce Wen RN  Outcome: Progressing  Goal: Hemodynamic stability and optimal renal function maintained  3/14/2024 1256 by Destiney Sewell RN  Outcome: Progressing  3/14/2024 0441 by Dulce Wen RN  Outcome: Progressing  Goal: Glucose maintained within prescribed range  3/14/2024 1256 by Destiney Sewell RN  Outcome: Progressing  3/14/2024 0441 by Dulce Wen RN  Outcome: Progressing     
optimal renal function maintained  Outcome: Progressing  Flowsheets (Taken 3/12/2024 1634)  Hemodynamic stability and optimal renal function maintained:   Monitor labs and assess for signs and symptoms of volume excess or deficit   Monitor intake, output and patient weight   Encourage oral intake as appropriate  Goal: Glucose maintained within prescribed range  Outcome: Progressing  Flowsheets (Taken 3/12/2024 1634)  Glucose maintained within prescribed range:   Monitor blood glucose as ordered   Administer ordered medications to maintain glucose within target range   Assess for signs and symptoms of hyperglycemia and hypoglycemia

## 2024-03-15 LAB — GLUCOSE BLD-MCNC: 174 MG/DL (ref 65–105)

## 2024-03-19 ENCOUNTER — HOSPITAL ENCOUNTER (OUTPATIENT)
Age: 74
Setting detail: SPECIMEN
Discharge: HOME | End: 2024-03-19
Payer: MEDICARE

## 2024-03-19 LAB
ANION GAP SERPL CALCULATED.3IONS-SCNC: 10 MMOL/L (ref 9–16)
BUN SERPL-MCNC: 22 MG/DL (ref 8–23)
CALCIUM SERPL-MCNC: 8.9 MG/DL (ref 8.6–10.4)
CHLORIDE SERPL-SCNC: 104 MMOL/L (ref 98–107)
CO2 SERPL-SCNC: 28 MMOL/L (ref 20–31)
CREAT SERPL-MCNC: 1.1 MG/DL (ref 0.5–0.9)
ERYTHROCYTE [DISTWIDTH] IN BLOOD BY AUTOMATED COUNT: 13.6 % (ref 11.8–14.4)
GFR SERPL CREATININE-BSD FRML MDRD: 52 ML/MIN/1.73M2
GLUCOSE SERPL-MCNC: 98 MG/DL (ref 74–99)
HCT VFR BLD AUTO: 37 % (ref 36.3–47.1)
HGB BLD-MCNC: 11.7 G/DL (ref 11.9–15.1)
MCH RBC QN AUTO: 29.8 PG (ref 25.2–33.5)
MCHC RBC AUTO-ENTMCNC: 31.6 G/DL (ref 28.4–34.8)
MCV RBC AUTO: 94.1 FL (ref 82.6–102.9)
NRBC BLD-RTO: 0 PER 100 WBC
PLATELET # BLD AUTO: 150 K/UL (ref 138–453)
PMV BLD AUTO: 12.5 FL (ref 8.1–13.5)
POTASSIUM SERPL-SCNC: 4.1 MMOL/L (ref 3.7–5.3)
RBC # BLD AUTO: 3.93 M/UL (ref 3.95–5.11)
SODIUM SERPL-SCNC: 142 MMOL/L (ref 136–145)
WBC OTHER # BLD: 8.4 K/UL (ref 3.5–11.3)

## 2024-03-19 PROCEDURE — P9603 ONE-WAY ALLOW PRORATED MILES: HCPCS

## 2024-03-19 PROCEDURE — 80048 BASIC METABOLIC PNL TOTAL CA: CPT

## 2024-03-19 PROCEDURE — 85027 COMPLETE CBC AUTOMATED: CPT

## 2024-03-19 PROCEDURE — 36415 COLL VENOUS BLD VENIPUNCTURE: CPT

## 2024-03-22 ENCOUNTER — CARE COORDINATION (OUTPATIENT)
Dept: CARE COORDINATION | Age: 74
End: 2024-03-22

## 2024-03-22 NOTE — CARE COORDINATION
Ambulatory Care Coordination Note  3/22/2024    Patient Current Location:  Home: [address]  [place] [address]     ACM contacted the patient by telephone. Verified name and  with patient as identifiers. Provided introduction to self, and explanation of the ACM role.     Challenges to be reviewed by the provider   Additional needs identified to be addressed with provider: No  none               Method of communication with provider: none.    ACM: Travon Cervantes, RN    Spoke with patient and she states that she is currently in rehab but is expected to go home the end of next week.States that he nephew passed away and they are having the celebration of life tonight, which she'll go to but not for long. We agreed that I will call her next Friday after DC from SNF    Offered patient enrollment in the Remote Patient Monitoring (RPM) program for in-home monitoring: Yes, patient already enrolled.    Lab Results       None            Care Coordination Interventions    Referral from Primary Care Provider: No  Suggested Interventions and Community Resources  Behavorial Health: In Process  Diabetes Education: In Process  Fall Risk Prevention: In Process  Home Health Services: Completed (Comment: AOOA)  Medi Set or Pill Pack: Not Started  Registered Dietician: Not Started  Transportation Support: Completed  Zone Management Tools: Completed (Comment: HF, COPD, DM right time right care)          Goals Addressed    None         Future Appointments   Date Time Provider Department Center   3/26/2024 10:00 AM Prabha Hammond APRN - CNP [facility] [place]   2024  8:15 AM [facility] RM [address] [facility]   2024  1:30 PM Carla Chua APRN - CNP [facility]   2024  2:30 PM Ángela Collier, VERONIKA - NP [facility]

## 2024-03-26 ENCOUNTER — HOSPITAL ENCOUNTER (OUTPATIENT)
Age: 74
Setting detail: SPECIMEN
Discharge: HOME | End: 2024-03-26

## 2024-03-26 LAB
ANION GAP SERPL CALCULATED.3IONS-SCNC: 9 MMOL/L (ref 9–17)
BUN SERPL-MCNC: 23 MG/DL (ref 8–23)
CALCIUM SERPL-MCNC: 9.4 MG/DL (ref 8.6–10.4)
CHLORIDE SERPL-SCNC: 104 MMOL/L (ref 98–107)
CO2 SERPL-SCNC: 28 MMOL/L (ref 20–31)
CREAT SERPL-MCNC: 1 MG/DL (ref 0.5–0.9)
ERYTHROCYTE [DISTWIDTH] IN BLOOD BY AUTOMATED COUNT: 13.9 % (ref 11.8–14.4)
GFR SERPL CREATININE-BSD FRML MDRD: 59 ML/MIN/1.73M2
GLUCOSE SERPL-MCNC: 173 MG/DL (ref 70–99)
HCT VFR BLD AUTO: 35.3 % (ref 36.3–47.1)
HGB BLD-MCNC: 11.4 G/DL (ref 11.9–15.1)
MCH RBC QN AUTO: 29.8 PG (ref 25.2–33.5)
MCHC RBC AUTO-ENTMCNC: 32.3 G/DL (ref 28.4–34.8)
MCV RBC AUTO: 92.4 FL (ref 82.6–102.9)
NRBC BLD-RTO: 0 PER 100 WBC
PLATELET # BLD AUTO: 171 K/UL (ref 138–453)
PMV BLD AUTO: 12.1 FL (ref 8.1–13.5)
POTASSIUM SERPL-SCNC: 3.8 MMOL/L (ref 3.7–5.3)
RBC # BLD AUTO: 3.82 M/UL (ref 3.95–5.11)
SODIUM SERPL-SCNC: 141 MMOL/L (ref 135–144)
WBC OTHER # BLD: 9.2 K/UL (ref 3.5–11.3)

## 2024-03-26 PROCEDURE — 85027 COMPLETE CBC AUTOMATED: CPT

## 2024-03-26 PROCEDURE — 80048 BASIC METABOLIC PNL TOTAL CA: CPT

## 2024-03-26 PROCEDURE — 36415 COLL VENOUS BLD VENIPUNCTURE: CPT

## 2024-03-26 PROCEDURE — P9603 ONE-WAY ALLOW PRORATED MILES: HCPCS

## 2024-03-29 ENCOUNTER — HOSPITAL ENCOUNTER (OUTPATIENT)
Dept: PAIN MANAGEMENT | Age: 74
Discharge: HOME | End: 2024-03-29

## 2024-03-29 VITALS — BODY MASS INDEX: 40.56 KG/M2 | HEIGHT: 62 IN

## 2024-03-29 NOTE — PROGRESS NOTES
Called pt to remind of appointment to start Cardiac Rehab on Monday, pt states still in Rehab and will not be discharged until Tuesday. Appointment rescheduled.

## 2024-04-01 ENCOUNTER — HOSPITAL ENCOUNTER (OUTPATIENT)
Dept: CARDIAC REHAB | Age: 74
Setting detail: THERAPIES SERIES
Discharge: HOME | End: 2024-04-01
Attending: INTERNAL MEDICINE

## 2024-04-02 ENCOUNTER — HOSPITAL ENCOUNTER (OUTPATIENT)
Age: 74
Setting detail: SPECIMEN
Discharge: HOME | End: 2024-04-02

## 2024-04-02 LAB
ANION GAP SERPL CALCULATED.3IONS-SCNC: 15 MMOL/L (ref 9–16)
BUN SERPL-MCNC: 25 MG/DL (ref 8–23)
CALCIUM SERPL-MCNC: 9.3 MG/DL (ref 8.6–10.4)
CHLORIDE SERPL-SCNC: 101 MMOL/L (ref 98–107)
CO2 SERPL-SCNC: 24 MMOL/L (ref 20–31)
CREAT SERPL-MCNC: 1.1 MG/DL (ref 0.5–0.9)
ERYTHROCYTE [DISTWIDTH] IN BLOOD BY AUTOMATED COUNT: 13.9 % (ref 11.8–14.4)
GFR SERPL CREATININE-BSD FRML MDRD: 56 ML/MIN/1.73M2
GLUCOSE SERPL-MCNC: 248 MG/DL (ref 74–99)
HCT VFR BLD AUTO: 40.4 % (ref 36.3–47.1)
HGB BLD-MCNC: 12.9 G/DL (ref 11.9–15.1)
MCH RBC QN AUTO: 30 PG (ref 25.2–33.5)
MCHC RBC AUTO-ENTMCNC: 31.9 G/DL (ref 28.4–34.8)
MCV RBC AUTO: 94 FL (ref 82.6–102.9)
NRBC BLD-RTO: 0 PER 100 WBC
PLATELET # BLD AUTO: 185 K/UL (ref 138–453)
PMV BLD AUTO: 12.6 FL (ref 8.1–13.5)
POTASSIUM SERPL-SCNC: 4 MMOL/L (ref 3.7–5.3)
RBC # BLD AUTO: 4.3 M/UL (ref 3.95–5.11)
SODIUM SERPL-SCNC: 140 MMOL/L (ref 136–145)
WBC OTHER # BLD: 7.2 K/UL (ref 3.5–11.3)

## 2024-04-02 PROCEDURE — P9603 ONE-WAY ALLOW PRORATED MILES: HCPCS

## 2024-04-02 PROCEDURE — 85027 COMPLETE CBC AUTOMATED: CPT

## 2024-04-02 PROCEDURE — 36415 COLL VENOUS BLD VENIPUNCTURE: CPT

## 2024-04-02 PROCEDURE — 80048 BASIC METABOLIC PNL TOTAL CA: CPT

## 2024-04-03 ENCOUNTER — CARE COORDINATION (OUTPATIENT)
Dept: CARE COORDINATION | Age: 74
End: 2024-04-03

## 2024-04-03 ENCOUNTER — CARE COORDINATION (OUTPATIENT)
Dept: CASE MANAGEMENT | Age: 74
End: 2024-04-03

## 2024-04-03 DIAGNOSIS — I21.3 ST ELEVATION MYOCARDIAL INFARCTION (STEMI), UNSPECIFIED ARTERY (HCC): Primary | ICD-10-CM

## 2024-04-03 PROCEDURE — 1111F DSCHRG MED/CURRENT MED MERGE: CPT | Performed by: NURSE PRACTITIONER

## 2024-04-03 NOTE — CARE COORDINATION
Contacted [facility] who states they tried to call pt but voicemail was full.  Agency called granddaughter who is suppose to have pt call agency to est SOC date.  Will notify CTN at this time.

## 2024-04-03 NOTE — CARE COORDINATION
Care Transitions Initial Follow Up Call    Call within 2 business days of discharge: Yes    Patient Current Location:  Home: [address]  [place] [facility] [address]    Care Transition Nurse contacted the patient by telephone to perform post hospital discharge assessment. Verified name and  with patient as identifiers. Provided introduction to self, and explanation of the Care Transition Nurse role.     Patient: Mariangel Abreu Patient : 1950   MRN: [id number]  Reason for Admission: Polyneuropathy/ NSTEMI    Discharge Date: 3/14/24 RARS: Readmission Risk Score: 24.7      Last Discharge Facility       Date Complaint Diagnosis Description Type Department Provider    3/11/24  Polyneuropathy, unspecified ... Admission (Discharged) Arya Interiano MD            Was this an external facility discharge? Yes, 24  Discharge Facility: [facility]    Challenges to be reviewed by the provider   Additional needs identified to be addressed with provider: No  none               Method of communication with provider: none.    Patient states she feels so much better. Denies sob, cp, tightness, Palpitations,cough, congestion, swelling or bladder symptoms. She states she had diarrhea and a upset stomach. States it resolved with Peptol Bismol and imodium. Back pain 9/10. After Percocet 5-325mg 0/10. States she monitors her BG. This am 178  Patient is on a sliding scale, Lantus and Alogliptin on med list. Medications reviewed. Appetite okay. Patient ambulates using a cane, walker scooter. States she is able to sponge bath and dress self. States she is able to cook for herself. She lives in [facility]. States resident within the apartment gather in a cafe within their building. States she has a friend that checks on her multiple times a day. She helps with ADL's.  Patient has a hospital bed and lift chair. Patient has [facility]. She would like them to know she is home. Request to follow up

## 2024-04-03 NOTE — CARE COORDINATION
Care Transitions Outreach Attempt    Call within 2 business days of discharge: Yes     Attempted to reach patient for transitions of care follow up. Unable to reach patient or leave a message d/t vm is full. Will attempt later.    Patient: Mariangel Abreu Patient : 1950 MRN: [id number]    Last Discharge Facility       Date Complaint Diagnosis Description Type Department Provider    3/11/24  Polyneuropathy, unspecified ... Admission (Discharged) [address] Arya Tyler MD              Was this an external facility discharge? Yes, 24  Discharge Facility Name: [facility]    Noted following upcoming appointments from discharge chart review:   [facility] follow up appointment(s):   Future Appointments   Date Time Provider Department Center   2024  1:30 PM Carla Chua, VERONIKA - CNP [facility]   2024  2:30 PM Ángela Collier, VERONIKA - NP [facility]   2024  9:30 AM STC CARD REHAB RM 01 [facility]

## 2024-04-04 ENCOUNTER — CARE COORDINATION (OUTPATIENT)
Dept: CARE COORDINATION | Age: 74
End: 2024-04-04

## 2024-04-04 NOTE — CARE COORDINATION
Contacted [facility] and left a voicemail to possibly move upcoming PCP appointment.

## 2024-04-05 ENCOUNTER — CARE COORDINATION (OUTPATIENT)
Dept: PRIMARY CARE CLINIC | Age: 74
End: 2024-04-05

## 2024-04-05 ENCOUNTER — CARE COORDINATION (OUTPATIENT)
Dept: CARE COORDINATION | Age: 74
End: 2024-04-05

## 2024-04-05 DIAGNOSIS — I10 ESSENTIAL HYPERTENSION: ICD-10-CM

## 2024-04-05 DIAGNOSIS — I50.32 CHRONIC DIASTOLIC CONGESTIVE HEART FAILURE (HCC): ICD-10-CM

## 2024-04-05 DIAGNOSIS — N18.30 STAGE 3 CHRONIC KIDNEY DISEASE, UNSPECIFIED WHETHER STAGE 3A OR 3B CKD (HCC): ICD-10-CM

## 2024-04-05 DIAGNOSIS — J44.9 CHRONIC OBSTRUCTIVE PULMONARY DISEASE, UNSPECIFIED COPD TYPE (HCC): ICD-10-CM

## 2024-04-05 DIAGNOSIS — E11.42 TYPE 2 DIABETES MELLITUS WITH DIABETIC POLYNEUROPATHY, WITH LONG-TERM CURRENT USE OF INSULIN (HCC): Primary | Chronic | ICD-10-CM

## 2024-04-05 DIAGNOSIS — Z79.4 TYPE 2 DIABETES MELLITUS WITH DIABETIC POLYNEUROPATHY, WITH LONG-TERM CURRENT USE OF INSULIN (HCC): Primary | Chronic | ICD-10-CM

## 2024-04-05 NOTE — CARE COORDINATION
Per note, ACM did discuss moving her PCP appointment up from the 17th but she stated that she is ok with the currently scheduled appointment on 4/17.  Will notify CTN at this time.

## 2024-04-05 NOTE — CARE COORDINATION
Planning: Avoidance of concentrated sweets   How often do you test your blood sugar?: Bedtime, Meals   Do you have barriers with adherence to non-pharmacologic self-management interventions? (Nutrition/Exercise/Self-Monitoring): No   Have you ever had to go to the ED for symptoms of low blood sugar?: No            ,   Congestive Heart Failure Assessment    Are you currently restricting fluids?: No Restriction  Do you understand a low sodium diet?: Yes  Do you understand how to read food labels?: Yes  How many restaurant meals do you eat per week?: 0  Do you salt your food before tasting it?: No         Symptoms:          ,   COPD Assessment    Does the patient understand envrionmental exposure?: Yes  Is the patient able to verbalize Rescue vs. Long Acting medications?: Yes  Does the patient have a nebulizer?: Yes  Does the patient use a space with inhaled medications?: No            Symptoms:          , and   Hypertension - Encounter Level

## 2024-04-05 NOTE — PROGRESS NOTES
Remote Patient Order Discontinued    Received request from Travon Cervantes RN   to discontinue order for remote patient monitoring of Kidney Disease , CHF, COPD, Diabetes, and HTN and order completed.

## 2024-04-05 NOTE — CARE COORDINATION
Patient Mariangel Abreu  04/05/24     Care Coordination  placed call to patient to arrange RPM kit  through [employer]. Voicemail is Full     provided return and how to pack equipment in original packing via the patients voicemail if available and provided call back number should patient have questions.    Patient made aware [employer] will  equipment in 2-4 days.

## 2024-04-08 ENCOUNTER — HOSPITAL ENCOUNTER (OUTPATIENT)
Dept: CARDIAC REHAB | Age: 74
Setting detail: THERAPIES SERIES
Discharge: HOME | End: 2024-04-08
Attending: INTERNAL MEDICINE

## 2024-04-10 ENCOUNTER — CARE COORDINATION (OUTPATIENT)
Dept: CARE COORDINATION | Age: 74
End: 2024-04-10

## 2024-04-10 ASSESSMENT — ENCOUNTER SYMPTOMS: DYSPNEA ASSOCIATED WITH: EXERTION

## 2024-04-10 NOTE — CARE COORDINATION
blood sugar?: Bedtime, Meals   Do you have barriers with adherence to non-pharmacologic self-management interventions? (Nutrition/Exercise/Self-Monitoring): No   Have you ever had to go to the ED for symptoms of low blood sugar?: No       Increase or Decrease trend in Blood Sugars   Do you have hyperglycemia symptoms?: No   Do you have hypoglycemia symptoms?: No   Last Blood Sugar Value: 181   Blood Sugar Monitoring Regimen: Before Meals, Morning Fasting, At Bedtime   Blood Sugar Trends: Fluctuating        ,   Congestive Heart Failure Assessment    Are you currently restricting fluids?: No Restriction  Do you understand a low sodium diet?: Yes  Do you understand how to read food labels?: Yes  How many restaurant meals do you eat per week?: 0  Do you salt your food before tasting it?: No     No patient-reported symptoms      Symptoms:  CHF associated dyspnea on exertion: Pos      Symptom course: stable  Patient-reported weight (lb): 221  Weight trend: stable  Salt intake watch compared to last visit: stable     ,   COPD Assessment    Does the patient understand envrionmental exposure?: Yes  Is the patient able to verbalize Rescue vs. Long Acting medications?: Yes  Does the patient have a nebulizer?: Yes  Does the patient use a space with inhaled medications?: No     No patient-reported symptoms         Symptoms:     Symptom course: stable  Breathlessness: exertion  Increase use of rapid acting/rescue inhaled medications?: No  Change in chronic cough?: No/At Baseline  Change in sputum?: No/At Baseline  Baseline SaO2 Readin (Comment: O2 at @L PRN)  Have you had a recent diagnosis of pneumonia either by PCP or at a hospital?: No     , and   Hypertension - Encounter Level    Symptom course: stable

## 2024-04-15 DIAGNOSIS — E78.2 MIXED HYPERLIPIDEMIA: Chronic | ICD-10-CM

## 2024-04-15 RX ORDER — ATORVASTATIN CALCIUM 40 MG/1
40 TABLET, FILM COATED ORAL DAILY
Qty: 30 TABLET | Refills: 6 | Status: SHIPPED | OUTPATIENT
Start: 2024-04-15

## 2024-04-15 NOTE — TELEPHONE ENCOUNTER
[facility]: ADHERENCE REVIEW  Identified care gap per [employer]: fills at Non-preferred pharmacy: Kenan Mendoza (is LIS and/or DSNP, so \"preferred\" pharmacy network may not apply): Statin adherence    Patient also appears to be prescribed: Diabetes    ASSESSMENT  DIABETES ADHERENCE    Insurance Records claims through 24 (Prior Year PDC = not reported; YTD PDC = not reported): insulin    Lab Results   Component Value Date    LABA1C 10.7 (H) 2024    LABA1C 14.1 (H) 2024    LABA1C 14.7 (H) 2024     STATIN ADHERENCE    Insurance Records claims through 24 (Prior Year PDC = not reported; YTD PDC = FIRST FILL; Potential Fail Date: 5/3/24):   ATORVASTATIN TAB 40MG last filled on 24 for 28 day supply. Next refill due: 24    Prescribed si tablet/capsule daily    Per chart:  Rx on medication list  in March  Patient in hospital/SNF @- and @3/11-    Lab Results   Component Value Date    CHOL 211 (H) 2022    TRIG 202 (H) 2022    HDL 43 2022    LDLCHOLESTEROL 128 2022     ALT   Date Value Ref Range Status   2024 23 5 - 33 U/L Final     AST   Date Value Ref Range Status   2024 46 (H) <32 U/L Final     The ASCVD Risk score (Presley DK, et al., 2019) failed to calculate for the following reasons:    The patient has a prior MI or stroke diagnosis     PLAN  Per insurer report, LIS-3 - co-pays are $0.00 through all phases of the benefit, regardless of the days' supply dispensed.    The following are interventions that have been identified:   Patient NEEDS REFILLS for atorvastatin (appears rx likely ; patient has been in hospital/SNF, so likely had supply on hand at home v past due to refill)  Appears 28-ds rx fills (likely adherence packaging?)    Reached patient to review, confirms PCP appt Wednesday and that she has been using up rxs from SNF discharge, then will return to her bubble backs from

## 2024-04-15 NOTE — TELEPHONE ENCOUNTER
Carla Chua, APRN - CNP, patient out of refills. Rx(s) pended for your signature/modification as appropriate    Last Visit: 3/5/24  Next Visit: 4/17/24    Thank you,  Melia Bassett, PharmD, [facility]  Population Health Pharmacist  [facility]  Department, toll free: [phone], option 1

## 2024-04-16 ENCOUNTER — HOSPITAL ENCOUNTER (OUTPATIENT)
Dept: PAIN MANAGEMENT | Age: 74
Discharge: HOME | End: 2024-04-16
Payer: MEDICARE

## 2024-04-16 VITALS — WEIGHT: 221 LBS | HEIGHT: 62 IN | BODY MASS INDEX: 40.67 KG/M2

## 2024-04-16 DIAGNOSIS — Z79.891 CHRONIC USE OF OPIATE FOR THERAPEUTIC PURPOSE: ICD-10-CM

## 2024-04-16 DIAGNOSIS — M47.817 LUMBOSACRAL SPONDYLOSIS WITHOUT MYELOPATHY: Chronic | ICD-10-CM

## 2024-04-16 DIAGNOSIS — G62.9 POLYNEUROPATHY, UNSPECIFIED: ICD-10-CM

## 2024-04-16 DIAGNOSIS — M54.41 CHRONIC BILATERAL LOW BACK PAIN WITH BILATERAL SCIATICA: Chronic | ICD-10-CM

## 2024-04-16 DIAGNOSIS — M50.30 DDD (DEGENERATIVE DISC DISEASE), CERVICAL: Primary | Chronic | ICD-10-CM

## 2024-04-16 DIAGNOSIS — M54.16 LUMBAR RADICULOPATHY, CHRONIC: ICD-10-CM

## 2024-04-16 DIAGNOSIS — G89.29 CHRONIC BILATERAL LOW BACK PAIN WITH BILATERAL SCIATICA: Chronic | ICD-10-CM

## 2024-04-16 DIAGNOSIS — M54.42 CHRONIC BILATERAL LOW BACK PAIN WITH BILATERAL SCIATICA: Chronic | ICD-10-CM

## 2024-04-16 DIAGNOSIS — M46.1 SACROILIITIS, NOT ELSEWHERE CLASSIFIED (HCC): Chronic | ICD-10-CM

## 2024-04-16 DIAGNOSIS — M51.36 DDD (DEGENERATIVE DISC DISEASE), LUMBAR: ICD-10-CM

## 2024-04-16 PROCEDURE — 99213 OFFICE O/P EST LOW 20 MIN: CPT

## 2024-04-16 PROCEDURE — 99214 OFFICE O/P EST MOD 30 MIN: CPT | Performed by: NURSE PRACTITIONER

## 2024-04-16 RX ORDER — OXYCODONE HYDROCHLORIDE AND ACETAMINOPHEN 5; 325 MG/1; MG/1
1 TABLET ORAL EVERY 8 HOURS PRN
Qty: 90 TABLET | Refills: 0 | Status: SHIPPED | OUTPATIENT
Start: 2024-04-16 | End: 2024-05-16

## 2024-04-16 ASSESSMENT — PAIN SCALES - GENERAL: PAINLEVEL_OUTOF10: 8

## 2024-04-16 ASSESSMENT — ENCOUNTER SYMPTOMS
CONSTIPATION: 0
BACK PAIN: 1
SHORTNESS OF BREATH: 0
COUGH: 0
BOWEL INCONTINENCE: 0

## 2024-04-16 NOTE — TELEPHONE ENCOUNTER
Noted atorvastatin rx reordered, thank you!    =======================================================   For Pharmacy Admin Tracking Only    Program: Pop Health  CPA in place:  No  Recommendation Provided To: Provider: 1 via Note to Provider and Patient/Caregiver: 1 via Telephone  Intervention Detail: Adherence Monitorin and Refill(s) Provided  Intervention Accepted By: Provider: 1 and Patient/Caregiver: 1  Gap Closed?: Yes   Time Spent (min): 15

## 2024-04-16 NOTE — PROGRESS NOTES
Chief Complaint   Patient presents with    Back Pain     Med refill         PMH     atrial fibrillation, CKD stage III, CHF, COPD, DM CVA MI with 4 stents and on brilinta  chronic hypoxic respiratory failure and HIGINIO on CPAP - has narcan 4/16/2024    Pt c/o low back pain that radiates down legs. No known injury or surgery to the area with onset more than 1 year ago and has progressively worsened  MRI 10- with multilevel degenerative changes and Right paracentral disc extrusion L4-5 narrowing the right lateral recess.   She is not interested in seeing NS for opinion.    Patient states that she had a bad experience with an injection in the past and is not interested in any interventional pain procedures      HPI:     Back Pain  This is a chronic problem. The current episode started more than 1 year ago. The problem occurs constantly. The problem is unchanged. The pain is present in the lumbar spine. The pain radiates to the left foot, left thigh, left knee, right foot, right knee and right thigh. The pain is at a severity of 8/10. The pain is The same all the time. The symptoms are aggravated by bending, sitting and standing. Associated symptoms include headaches and weakness. Pertinent negatives include no bladder incontinence, bowel incontinence, chest pain, fever, numbness or tingling. Risk factors include menopause, obesity, poor posture, sedentary lifestyle and lack of exercise. She has tried ice, heat and analgesics for the symptoms. The treatment provided mild relief.         Patient denies any new neurological symptoms. No bowel or bladder incontinence, no weakness, and no falling.    Pill count: appropriate / Oxycodone - 0, pt states she took her last dose last month due to being in and out of the hospital.     Morphine equivalent: 22.5    Periodic Controlled Substance Monitoring: Possible medication side effects, risk of tolerance/dependence & alternative treatments discussed., No signs of

## 2024-04-17 ENCOUNTER — OFFICE VISIT (OUTPATIENT)
Dept: PRIMARY CARE CLINIC | Age: 74
End: 2024-04-17

## 2024-04-17 VITALS
SYSTOLIC BLOOD PRESSURE: 127 MMHG | BODY MASS INDEX: 40.97 KG/M2 | WEIGHT: 224 LBS | DIASTOLIC BLOOD PRESSURE: 75 MMHG | OXYGEN SATURATION: 91 % | HEART RATE: 78 BPM

## 2024-04-17 DIAGNOSIS — E11.42 TYPE 2 DIABETES MELLITUS WITH DIABETIC POLYNEUROPATHY, WITH LONG-TERM CURRENT USE OF INSULIN (HCC): Primary | ICD-10-CM

## 2024-04-17 DIAGNOSIS — E78.2 MIXED HYPERLIPIDEMIA: ICD-10-CM

## 2024-04-17 DIAGNOSIS — J44.1 CHRONIC OBSTRUCTIVE PULMONARY DISEASE WITH ACUTE EXACERBATION (HCC): ICD-10-CM

## 2024-04-17 DIAGNOSIS — Z79.4 TYPE 2 DIABETES MELLITUS WITH DIABETIC POLYNEUROPATHY, WITH LONG-TERM CURRENT USE OF INSULIN (HCC): Primary | ICD-10-CM

## 2024-04-17 DIAGNOSIS — I25.10 CORONARY ARTERY DISEASE DUE TO LIPID RICH PLAQUE: ICD-10-CM

## 2024-04-17 DIAGNOSIS — I25.83 CORONARY ARTERY DISEASE DUE TO LIPID RICH PLAQUE: ICD-10-CM

## 2024-04-17 DIAGNOSIS — D68.69 SECONDARY HYPERCOAGULABLE STATE (HCC): ICD-10-CM

## 2024-04-17 DIAGNOSIS — K59.01 SLOW TRANSIT CONSTIPATION: ICD-10-CM

## 2024-04-17 DIAGNOSIS — I48.21 PERMANENT ATRIAL FIBRILLATION (HCC): ICD-10-CM

## 2024-04-17 DIAGNOSIS — F43.21 GRIEF REACTION: ICD-10-CM

## 2024-04-17 RX ORDER — DOCUSATE SODIUM 100 MG/1
100 CAPSULE, LIQUID FILLED ORAL 2 TIMES DAILY
Qty: 60 CAPSULE | Refills: 2 | Status: SHIPPED | OUTPATIENT
Start: 2024-04-17

## 2024-04-17 RX ORDER — IPRATROPIUM BROMIDE AND ALBUTEROL SULFATE 2.5; .5 MG/3ML; MG/3ML
3 SOLUTION RESPIRATORY (INHALATION) 4 TIMES DAILY
COMMUNITY
Start: 2016-04-22

## 2024-04-17 RX ORDER — INSULIN GLARGINE 100 [IU]/ML
INJECTION, SOLUTION SUBCUTANEOUS
Qty: 15 ADJUSTABLE DOSE PRE-FILLED PEN SYRINGE | Refills: 1 | Status: SHIPPED | OUTPATIENT
Start: 2024-04-17

## 2024-04-17 NOTE — PROGRESS NOTES
visit  Cardiac rehab on the     Feels tired, but no CP or SOB  Asking for colace for constipation    Sugars 160 fasting or higher    Mariangel states that her son  from liver cirrhosis right before she was readmitted for cardiac cath, has been \"spacing out more\" and is generally feeling a little more down.         Review of Systems       Objective     DIAGNOSTIC FINDINGS:  CBC:  Lab Results   Component Value Date/Time    WBC 7.2 2024 08:45 AM    HGB 12.9 2024 08:45 AM     2024 08:45 AM     2012 06:13 AM       BMP:    Lab Results   Component Value Date/Time     2024 08:45 AM    K 4.0 2024 08:45 AM     2024 08:45 AM    CO2 24 2024 08:45 AM    BUN 25 2024 08:45 AM    CREATININE 1.1 2024 08:45 AM    GLUCOSE 248 2024 08:45 AM    GLUCOSE 123 2012 06:13 AM       HEMOGLOBIN A1C:   Lab Results   Component Value Date/Time    LABA1C 10.7 2024 08:41 PM       FASTING LIPID PANEL:  Lab Results   Component Value Date    CHOL 211 (H) 2022    HDL 43 2022    TRIG 202 (H) 2022       Physical Exam  Vitals and nursing note reviewed.   Constitutional:       General: She is not in acute distress.     Appearance: Normal appearance. She is obese.   HENT:      Head: Normocephalic.      Right Ear: External ear normal.      Left Ear: External ear normal.      Nose: Nose normal. No congestion or rhinorrhea.      Mouth/Throat:      Mouth: Mucous membranes are moist.      Pharynx: Oropharynx is clear.   Eyes:      General: No scleral icterus.     Conjunctiva/sclera: Conjunctivae normal.      Pupils: Pupils are equal, round, and reactive to light.   Cardiovascular:      Rate and Rhythm: Normal rate and regular rhythm.      Pulses: Normal pulses.      Heart sounds: Normal heart sounds. No murmur heard.  Pulmonary:      Effort: Pulmonary effort is normal. No respiratory distress.      Breath sounds: Normal breath sounds.

## 2024-04-25 ENCOUNTER — HOSPITAL ENCOUNTER (OUTPATIENT)
Dept: CARDIAC REHAB | Age: 74
Setting detail: THERAPIES SERIES
Discharge: HOME | End: 2024-04-25
Attending: INTERNAL MEDICINE
Payer: MEDICARE

## 2024-04-25 VITALS — WEIGHT: 224.9 LBS | BODY MASS INDEX: 41.39 KG/M2 | RESPIRATION RATE: 16 BRPM | HEIGHT: 62 IN | OXYGEN SATURATION: 93 %

## 2024-04-25 PROCEDURE — 93797 PHYS/QHP OP CAR RHAB WO ECG: CPT

## 2024-04-25 PROCEDURE — 93798 PHYS/QHP OP CAR RHAB W/ECG: CPT

## 2024-04-25 ASSESSMENT — EJECTION FRACTION: EF_VALUE: 50

## 2024-04-25 ASSESSMENT — PATIENT HEALTH QUESTIONNAIRE - PHQ9
SUM OF ALL RESPONSES TO PHQ QUESTIONS 1-9: 0
2. FEELING DOWN, DEPRESSED OR HOPELESS: NOT AT ALL
SUM OF ALL RESPONSES TO PHQ QUESTIONS 1-9: 0
1. LITTLE INTEREST OR PLEASURE IN DOING THINGS: NOT AT ALL
SUM OF ALL RESPONSES TO PHQ QUESTIONS 1-9: 0
SUM OF ALL RESPONSES TO PHQ QUESTIONS 1-9: 0
SUM OF ALL RESPONSES TO PHQ9 QUESTIONS 1 & 2: 0

## 2024-04-25 ASSESSMENT — EXERCISE STRESS TEST
PEAK_BP: 108/60
PEAK_HR: 74
PEAK_METS: 2.3
PEAK_BP: 108/60
PEAK_RPE: 13

## 2024-04-25 NOTE — FLOWSHEET NOTE
04/25/24 0935   Treatment Diagnosis   Treatment Diagnosis 1 PCI   PCI Date 02/01/24   Treatment Diagnosis 2 STEMI   STEMI Date 02/01/24   Referral Date 02/02/24   Significant Cardiovascular History Chronic atrial fibrillation;History of heart failure  (PCI 3/11/2024, HLD, HTN, LOOP RECORDER)   Co-morbidities Cerebrovascular disease;Diabetes;Pulmonary disease;Renal disease  (ANXIETY, ASTHMA, CKD - STAGE 3, COPD, DEPRESSION, EMPHYSEMA, MIGRAINES, OBESITY, HOME O2 AS NEEDED 2L/NC, HIGINIO - CPAP AT NIGHT, CVA - MINI, [place] - GETTING HEARING AIDS CHECKED.)   Oxygen Saturation / Titration    Stages of Change  Maintenance   Oxygen Intervention   Oxygen Use Yes  (PRN)   Oxygen Type  Nasal canula   Patients Liter Flow  2L   O2 Sat Greater Than 90% Yes  (93%)   Nurse/Patient Discussion  YES   Oxygen Education  Oxygen Safety / Hazaard;Traveling with Oxygen;Types of Oxygen;Proper care of Oxygen Equipment;Emergency Oxygen useage   Oxygen Target Goals  Understand use of oxygen;Discontinue oxygen useage;Decrease liters required;Use oxygen as needed;Keep SA02 greater than 90%   Individual Treatment Plan   ITP Visit Type Initial assessment   1st Date of Exercise  04/25/24   ITP Next Review Date 05/22/24   Visit #/Total Visits 1&2/36   EF% 50 %  (50-55% ECHO 2/1/2024)   Risk Stratification Low   ITP Exercise;Psychosocial;Tobacco;Nutrition;Education   Exercise    DASI Total Score 19.45   Harley Total Score 30   Stages of Change Action   Assisted Devices Walker;Wheelchair  (MOTORIZED WHEELCHAIR)   Exercise Prescription   Mode Treadmill;Bike;Stepper;Ergometer  (FREE WEIGHTS)   Frequency per week 2   Duration Per Session 31 MIN   Intensity METS       2.3   RPE 13   Progression INITIAL   Resistance Training Yes   Exercise Blood Pressures   Resting /62   Peak /60   Is BP WDL Yes   Exercise Activity at Home   Type DOING EXERCISES FROM HOME PT;  ARM AND LEGS   Frequency DAILY   Duration 10 MINS   Resistance Training Yes   Exercise

## 2024-04-25 NOTE — DISCHARGE INSTRUCTIONS
continued use of nitroglycerin;  pounding heartbeats or fluttering in your chest;  slow heart rate;  blurred vision or dry mouth; or  a light-headed feeling, like you might pass out.  Common side effects may include:  mild burning or tingling with the tablet in your mouth;  headache;  dizziness, spinning sensation;  nausea, vomiting;  flushing (warmth, redness, or tingly feeling);  pale skin, increased sweating; or  feeling weak or dizzy.  This is not a complete list of side effects and others may occur. Call your doctor for medical advice about side effects. You may report side effects to FDA at 1-670-LPB-4373.  What other drugs will affect nitroglycerin?  Many drugs can interact with nitroglycerin. Not all possible interactions are listed here. Tell your doctor about all your medications and any you start or stop using during treatment with nitroglycerin, especially:  aspirin or heparin;  a diuretic or \"water pill\";  medicine to treat depression or mental illness; or  ergot medicine to treat migraine headache, such as dihydroergotamine, ergotamine, ergonovine, or methylergonovine.  This list is not complete and many other drugs can interact with nitroglycerin. This includes prescription and over-the-counter medicines, vitamins, and herbal products. Give a list of all your medicines to any healthcare provider who treats you.  Where can I get more information?  Your pharmacist can provide more information about nitroglycerin.    Remember, keep this and all other medicines out of the reach of children, never share your medicines with others, and use this medication only for the indication prescribed.  Every effort has been made to ensure that the information provided by [employer]. ('[employer]') is accurate, up-to-date, and complete, but no guarantee is made to that effect. Drug information contained herein may be time sensitive. Notable Limited information has been compiled for use by healthcare practitioners and

## 2024-04-29 ENCOUNTER — HOSPITAL ENCOUNTER (OUTPATIENT)
Dept: CARDIAC REHAB | Age: 74
Setting detail: THERAPIES SERIES
Discharge: HOME | End: 2024-04-29
Payer: MEDICARE

## 2024-04-29 VITALS — WEIGHT: 223.8 LBS | BODY MASS INDEX: 40.93 KG/M2

## 2024-04-29 PROCEDURE — 93798 PHYS/QHP OP CAR RHAB W/ECG: CPT

## 2024-04-29 ASSESSMENT — EXERCISE STRESS TEST
PEAK_RPE: 11
PEAK_METS: 2.5
PEAK_HR: 84
PEAK_BP: 104/58

## 2024-05-01 ENCOUNTER — HOSPITAL ENCOUNTER (OUTPATIENT)
Dept: CARDIAC REHAB | Age: 74
Setting detail: THERAPIES SERIES
Discharge: HOME | End: 2024-05-01
Payer: MEDICARE

## 2024-05-01 VITALS — BODY MASS INDEX: 40.6 KG/M2 | WEIGHT: 222 LBS

## 2024-05-01 PROCEDURE — 93798 PHYS/QHP OP CAR RHAB W/ECG: CPT

## 2024-05-01 ASSESSMENT — EXERCISE STRESS TEST
PEAK_METS: 2.8
PEAK_RPE: 12
PEAK_HR: 79
PEAK_BP: 102/66

## 2024-05-06 ENCOUNTER — HOSPITAL ENCOUNTER (OUTPATIENT)
Dept: CARDIAC REHAB | Age: 74
Setting detail: THERAPIES SERIES
Discharge: HOME | End: 2024-05-06
Payer: MEDICARE

## 2024-05-06 VITALS — WEIGHT: 223 LBS | BODY MASS INDEX: 40.79 KG/M2

## 2024-05-06 PROCEDURE — 93798 PHYS/QHP OP CAR RHAB W/ECG: CPT

## 2024-05-06 ASSESSMENT — EXERCISE STRESS TEST
PEAK_RPE: 12
PEAK_METS: 2.6
PEAK_HR: 91
PEAK_BP: 106/66

## 2024-05-08 ENCOUNTER — HOSPITAL ENCOUNTER (OUTPATIENT)
Dept: CARDIAC REHAB | Age: 74
Setting detail: THERAPIES SERIES
Discharge: HOME | End: 2024-05-08
Payer: MEDICARE

## 2024-05-08 VITALS — BODY MASS INDEX: 40.68 KG/M2 | WEIGHT: 222.4 LBS

## 2024-05-08 PROCEDURE — 93798 PHYS/QHP OP CAR RHAB W/ECG: CPT

## 2024-05-08 ASSESSMENT — EXERCISE STRESS TEST
PEAK_HR: 116
PEAK_METS: 2.6
PEAK_RPE: 12
PEAK_BP: 120/72

## 2024-05-13 ENCOUNTER — HOSPITAL ENCOUNTER (OUTPATIENT)
Dept: CARDIAC REHAB | Age: 74
Setting detail: THERAPIES SERIES
End: 2024-05-13
Payer: MEDICARE

## 2024-05-13 ENCOUNTER — HOSPITAL ENCOUNTER (OUTPATIENT)
Dept: PAIN MANAGEMENT | Age: 74
Discharge: HOME | End: 2024-05-13
Payer: MEDICARE

## 2024-05-13 VITALS — WEIGHT: 222 LBS | BODY MASS INDEX: 40.85 KG/M2 | HEIGHT: 62 IN

## 2024-05-13 DIAGNOSIS — M47.817 LUMBOSACRAL SPONDYLOSIS WITHOUT MYELOPATHY: Primary | ICD-10-CM

## 2024-05-13 DIAGNOSIS — E66.01 OBESITY, CLASS III, BMI 40-49.9 (MORBID OBESITY) (HCC): ICD-10-CM

## 2024-05-13 DIAGNOSIS — M50.30 DDD (DEGENERATIVE DISC DISEASE), CERVICAL: ICD-10-CM

## 2024-05-13 DIAGNOSIS — G62.9 POLYNEUROPATHY, UNSPECIFIED: ICD-10-CM

## 2024-05-13 DIAGNOSIS — M54.16 LUMBAR RADICULOPATHY, CHRONIC: ICD-10-CM

## 2024-05-13 DIAGNOSIS — M46.1 SACROILIITIS, NOT ELSEWHERE CLASSIFIED (HCC): Chronic | ICD-10-CM

## 2024-05-13 DIAGNOSIS — Z79.891 CHRONIC USE OF OPIATE FOR THERAPEUTIC PURPOSE: ICD-10-CM

## 2024-05-13 PROCEDURE — 99214 OFFICE O/P EST MOD 30 MIN: CPT | Performed by: STUDENT IN AN ORGANIZED HEALTH CARE EDUCATION/TRAINING PROGRAM

## 2024-05-13 PROCEDURE — 99213 OFFICE O/P EST LOW 20 MIN: CPT

## 2024-05-13 RX ORDER — OXYCODONE HYDROCHLORIDE AND ACETAMINOPHEN 5; 325 MG/1; MG/1
1 TABLET ORAL EVERY 8 HOURS PRN
Qty: 90 TABLET | Refills: 0 | Status: SHIPPED | OUTPATIENT
Start: 2024-05-16 | End: 2024-06-15

## 2024-05-13 ASSESSMENT — PAIN SCALES - GENERAL: PAINLEVEL_OUTOF10: 8

## 2024-05-13 NOTE — PROGRESS NOTES
Chronic Pain Clinic Note     Encounter Date: 5/13/2024     SUBJECTIVE:  Chief Complaint   Patient presents with    Back Pain     Med refill       History of Present Illness:   Mariangel Abreu is a 73 y.o. female who presents with back pain    Medication Refill: Percocet -9    Current Complaints of Pain:   Location: L back, gluteal   Radiation: b/l legs to feet  Severity: moderate   Pain Numerical Score - 7/8 today  Average: 8     Highest: 10  Lowest: 5  Character/Quality: Complains of pain that is aching  Timing: Intermittent  Associated symptoms: headaches  Numbness: yes  Weakness: yes  Exacerbating factors: bending, sitting, standing, twisting, position  Alleviating factors: nothing in particular  Length of time pain has been present: Started years ago  Inciting event/injury: no  Bowel/Bladder incontinence:no  Falls: no  Physical Therapy: yes with very little relief     History of Interventions:   Surgery: No previous lumbar/cervical surgeries  Injections: None    Imaging:    No recent imaging    Past Medical History:   Diagnosis Date    Abdominal bloating 01/26/2021    Adenomatous polyp of ascending colon 01/26/2021    Allergic rhinitis     Anxiety 07/17/2013    Arthritis     Asthma     Atrial fibrillation (HCC)     Back pain     NERVE/DR. GRACIA    Benign hypertension with CKD (chronic kidney disease) stage III (HCC)     CAD (coronary artery disease) 03/21/2013    Caffeine use     2 coffee/day    CHF (congestive heart failure) (HCC)     Chronic kidney disease     CKD (chronic kidney disease) stage 3, GFR 30-59 ml/min (HCC)     COPD (chronic obstructive pulmonary disease) (HCC)     emphysema    Degeneration of lumbar or lumbosacral intervertebral disc     Depression     DM (diabetes mellitus) (HCC)     Emphysema of lung (HCC)     Gastritis     GERD (gastroesophageal reflux disease)     Hearing loss     Hematuria     Hiatal hernia     History of loop recorder     HTN (hypertension), benign 06/28/2014

## 2024-05-15 ENCOUNTER — HOSPITAL ENCOUNTER (OUTPATIENT)
Dept: CARDIAC REHAB | Age: 74
Setting detail: THERAPIES SERIES
Discharge: HOME | End: 2024-05-15
Payer: MEDICARE

## 2024-05-15 VITALS — WEIGHT: 221 LBS | BODY MASS INDEX: 40.42 KG/M2

## 2024-05-15 PROCEDURE — 93798 PHYS/QHP OP CAR RHAB W/ECG: CPT

## 2024-05-15 ASSESSMENT — EXERCISE STRESS TEST
PEAK_HR: 102
PEAK_RPE: 12
PEAK_BP: 106/64
PEAK_METS: 2.5

## 2024-05-20 ENCOUNTER — HOSPITAL ENCOUNTER (OUTPATIENT)
Dept: CARDIAC REHAB | Age: 74
Setting detail: THERAPIES SERIES
Discharge: HOME | End: 2024-05-20
Payer: MEDICARE

## 2024-05-22 ENCOUNTER — HOSPITAL ENCOUNTER (OUTPATIENT)
Dept: CARDIAC REHAB | Age: 74
Setting detail: THERAPIES SERIES
End: 2024-05-22
Payer: MEDICARE

## 2024-05-27 ENCOUNTER — APPOINTMENT (OUTPATIENT)
Dept: CARDIAC REHAB | Age: 74
End: 2024-05-27
Payer: MEDICARE

## 2024-05-29 ENCOUNTER — CARE COORDINATION (OUTPATIENT)
Dept: CARE COORDINATION | Age: 74
End: 2024-05-29

## 2024-05-29 ENCOUNTER — HOSPITAL ENCOUNTER (OUTPATIENT)
Dept: CARDIAC REHAB | Age: 74
Setting detail: THERAPIES SERIES
Discharge: HOME | End: 2024-05-29
Payer: MEDICARE

## 2024-05-29 ENCOUNTER — HOSPITAL ENCOUNTER (OUTPATIENT)
Age: 74
Discharge: HOME | End: 2024-05-29
Payer: MEDICARE

## 2024-05-29 VITALS — WEIGHT: 220 LBS | BODY MASS INDEX: 40.24 KG/M2

## 2024-05-29 DIAGNOSIS — E11.42 TYPE 2 DIABETES MELLITUS WITH DIABETIC POLYNEUROPATHY, WITH LONG-TERM CURRENT USE OF INSULIN (HCC): Chronic | ICD-10-CM

## 2024-05-29 DIAGNOSIS — D50.9 IRON DEFICIENCY ANEMIA, UNSPECIFIED IRON DEFICIENCY ANEMIA TYPE: ICD-10-CM

## 2024-05-29 DIAGNOSIS — Z79.4 TYPE 2 DIABETES MELLITUS WITH DIABETIC POLYNEUROPATHY, WITH LONG-TERM CURRENT USE OF INSULIN (HCC): Chronic | ICD-10-CM

## 2024-05-29 LAB
CHOLEST SERPL-MCNC: 262 MG/DL
CHOLESTEROL/HDL RATIO: 6.6
EST. AVERAGE GLUCOSE BLD GHB EST-MCNC: 338 MG/DL
FERRITIN SERPL-MCNC: 270 NG/ML (ref 13–150)
HBA1C MFR BLD: 13.4 % (ref 4–6)
HDLC SERPL-MCNC: 40 MG/DL
LDLC SERPL CALC-MCNC: 173 MG/DL (ref 0–130)
TRIGL SERPL-MCNC: 245 MG/DL

## 2024-05-29 PROCEDURE — 36415 COLL VENOUS BLD VENIPUNCTURE: CPT

## 2024-05-29 PROCEDURE — 83036 HEMOGLOBIN GLYCOSYLATED A1C: CPT

## 2024-05-29 PROCEDURE — 80061 LIPID PANEL: CPT

## 2024-05-29 PROCEDURE — 82728 ASSAY OF FERRITIN: CPT

## 2024-05-29 PROCEDURE — 93798 PHYS/QHP OP CAR RHAB W/ECG: CPT

## 2024-05-29 ASSESSMENT — EJECTION FRACTION: EF_VALUE: 50

## 2024-05-29 ASSESSMENT — EXERCISE STRESS TEST
PEAK_METS: 2.7
PEAK_RPE: 13
PEAK_BP: 146/68
PEAK_HR: 94
PEAK_BP: 146/68

## 2024-05-29 ASSESSMENT — ENCOUNTER SYMPTOMS: DYSPNEA ASSOCIATED WITH: EXERTION

## 2024-05-29 NOTE — CARE COORDINATION
STC CARD REHAB RM 01 [facility] CARDI[facility]   6/11/2024  9:40 AM Prabha Hammond APRN - CNP [facility]   6/12/2024  8:00 AM STC CARD REHAB RM 01 [facility]CZ CARDIAC [facility]   6/17/2024  8:00 AM STC CARD REHAB RM 01 [facility]CZ CARDIAC [facility]   6/18/2024  9:45 AM Carla Chua APRN - CNP [facility]   6/19/2024  8:00 AM STC CARD REHAB RM 01 STCZ CARDIAC [facility]   6/24/2024  8:00 AM STC CARD REHAB RM 01 STCZ CARDIAC [facility]   6/26/2024  8:00 AM STC CARD REHAB RM 01 STCZ CARDIAC [facility]   7/1/2024  8:00 AM STC CARD REHAB RM 01 STCZ CARDIAC [facility]   7/3/2024  8:00 AM STC CARD REHAB RM 01 STCZ CARDIAC [facility]   7/8/2024  8:00 AM STC CARD REHAB RM 01 STCZ CARDIAC [facility]   7/10/2024  8:00 AM STC CARD REHAB RM 01 STCZ CARDIAC [facility]   7/15/2024  8:00 AM STC CARD REHAB RM 01 STCZ CARDIAC [facility]   7/17/2024  8:00 AM STC CARD REHAB RM 01 STCZ CARDIAC [facility]   7/22/2024  8:00 AM STC CARD REHAB RM 01 STCZ CARDIAC [facility]   7/24/2024  8:00 AM STC CARD REHAB RM 01 STCZ CARDIAC [facility]   7/29/2024  8:00 AM STC CARD REHAB RM 01 STCZ CARDIAC [facility]   7/31/2024  8:00 AM STC CARD REHAB RM 01 STCZ CARDIAC [facility]   8/5/2024  8:00 AM STC CARD REHAB RM 01 STCZ CARDI[facility]   8/7/2024  8:00 AM STC CARD REHAB RM 01 STCZ CARDIAC [facility]   8/8/2024  9:15 AM Salvador Galarza MD [facility]   8/12/2024  8:00 AM STC CARD REHAB RM 01 STCZ CARDIAC [facility]   8/14/2024  8:00 AM STC CARD REHAB RM 01 [facility]CZ CARDI[facility]   8/19/2024  8:00 AM STC CARD REHAB RM 01 STCZ CARDI[facility]   8/21/2024  8:00 AM STC CARD REHAB RM 01 [facility]CZ CARDI[facility]   8/26/2024  8:00 AM STC CARD REHAB RM 01 STCZ CARDI[facility]   8/28/2024  8:00 AM STC CARD REHAB RM 01 STCZ CARDI[facility]   9/4/2024  8:00 AM STC CARD REHAB RM 01 STCZ CARDI[facility]   9/9/2024  8:00 AM STC CARD REHAB RM 01 [facility] CARDI[facility]   ,   Diabetes Assessment    Medic Alert ID: No  Meal

## 2024-05-29 NOTE — PROGRESS NOTES
pulse;Exercise safety;Signs/symptoms to report;RPE scale;Equipment orientation;Warm up/cool down;Physically active   Exercise Target Goal   Target Goal(s) Individual exercise RX;BP < 140/90 or < 130/80, if DM or CKD;Aerobic activity 30 + minutes/day  5 days/week   Patient Stated Exercise Goals TO INCREASE STRENGTH AND ENDURANCE   Psychosocial   Stages of Change Maintenance   Psychosocial Intervention   Interventions No intervention indicated   Currently Taking Psychotropic Meds Yes  (LEXAPRO 20 MG)   Medication Changes No   Psychosocial Education   Education Benefits of CPR completion;Cardiac meds;Coping techniques;Environmental triggers;Impact self care behaviors on health;Relaxation techniques;Sexual activity;Signs/symptoms of depression   Psychosocial Target Goals   Target Goal(s) Assess presence or absence of depression using a valid screening tool;Demonstrates appropriate interaction with others;Engages in self-care behaviors;Maximizes coping skills   Uses Stress Mgmt Techniques Yes   Tobacco   Stages of Change Maintenance   Tobacco Use No   Quit Greater than 6 month   Date Quit 01/01/00   # Cigarette Smoked/Day 3 PPD FOR 41 YEARS   Tobacco Cessation Intervention   Smoking Cessation Referral No   Tobacco Education   Resource Information Provided No   Target Goal   Target Goal Complete cessation of tobacco use   Nutrition   Stages of Change Action  (PT STATES SHE IS TRYING TO FOLLOW A LOW SALT / LOW FAT CARDIAC AND DIABETIC DIET)   Diabetes Yes   HgbA1c 10.7   HgbA1c Date 03/11/24   BG at Home Yes  (220 FASTING THIS MORNING)   Diabetes Med(s) YES   Diabetes Med(s) Change No   BG in Range No   Lipids   Date Lipids Drawn   (PT HAS ACTIVE ORDER, PT REMINDED THAT THERE IS AN ORDER FOR A FASTING BLOOD DRAW. PT VERBALIZED UNDERSTANDING.)   Lipid Med(s) YES  (LIPITOR 40MG)   Lipid Med Change(s) No   Weight Management   Weight  99.8 kg (220 lb)   Height  1.575 m (5' 2.01\")   BMI 40.31   Alcohol None   Nutrition

## 2024-06-01 ENCOUNTER — HOSPITAL ENCOUNTER (OUTPATIENT)
Age: 74
Setting detail: OBSERVATION
Discharge: HOME | End: 2024-06-04
Attending: EMERGENCY MEDICINE | Admitting: EMERGENCY MEDICINE
Payer: MEDICARE

## 2024-06-01 ENCOUNTER — APPOINTMENT (OUTPATIENT)
Dept: GENERAL RADIOLOGY | Age: 74
End: 2024-06-01
Payer: MEDICARE

## 2024-06-01 DIAGNOSIS — I73.9 CLAUDICATION (HCC): ICD-10-CM

## 2024-06-01 DIAGNOSIS — R07.9 CHEST PAIN, UNSPECIFIED TYPE: Primary | ICD-10-CM

## 2024-06-01 LAB
ALBUMIN SERPL-MCNC: 4 G/DL (ref 3.5–5.2)
ALBUMIN/GLOB SERPL: 1 {RATIO} (ref 1–2.5)
ALP SERPL-CCNC: 99 U/L (ref 35–104)
ALT SERPL-CCNC: 17 U/L (ref 10–35)
ANION GAP SERPL CALCULATED.3IONS-SCNC: 12 MMOL/L (ref 9–16)
AST SERPL-CCNC: 23 U/L (ref 10–35)
BACTERIA URNS QL MICRO: NORMAL
BASOPHILS # BLD: 0.06 K/UL (ref 0–0.2)
BASOPHILS NFR BLD: 0 % (ref 0–2)
BILIRUB DIRECT SERPL-MCNC: <0.2 MG/DL (ref 0–0.3)
BILIRUB INDIRECT SERPL-MCNC: NORMAL MG/DL (ref 0–1)
BILIRUB SERPL-MCNC: 0.5 MG/DL (ref 0–1.2)
BILIRUB UR QL STRIP: NEGATIVE
BUN SERPL-MCNC: 15 MG/DL (ref 8–23)
CALCIUM SERPL-MCNC: 8.8 MG/DL (ref 8.6–10.4)
CASTS #/AREA URNS LPF: NORMAL /LPF (ref 0–8)
CHLORIDE SERPL-SCNC: 94 MMOL/L (ref 98–107)
CLARITY UR: CLEAR
CO2 SERPL-SCNC: 28 MMOL/L (ref 20–31)
COLOR UR: YELLOW
CREAT SERPL-MCNC: 0.9 MG/DL (ref 0.5–0.9)
EOSINOPHIL # BLD: 0.14 K/UL (ref 0–0.44)
EOSINOPHILS RELATIVE PERCENT: 1 % (ref 1–4)
EPI CELLS #/AREA URNS HPF: NORMAL /HPF (ref 0–5)
ERYTHROCYTE [DISTWIDTH] IN BLOOD BY AUTOMATED COUNT: 12.9 % (ref 11.8–14.4)
GFR, ESTIMATED: 64 ML/MIN/1.73M2
GLOBULIN SER CALC-MCNC: 3.2 G/DL
GLUCOSE BLD-MCNC: 379 MG/DL (ref 65–105)
GLUCOSE BLD-MCNC: 385 MG/DL (ref 65–105)
GLUCOSE BLD-MCNC: 390 MG/DL (ref 65–105)
GLUCOSE SERPL-MCNC: 403 MG/DL (ref 74–99)
GLUCOSE UR STRIP-MCNC: ABNORMAL MG/DL
HCT VFR BLD AUTO: 41.3 % (ref 36.3–47.1)
HGB BLD-MCNC: 13.8 G/DL (ref 11.9–15.1)
HGB UR QL STRIP.AUTO: NEGATIVE
IMM GRANULOCYTES # BLD AUTO: 0.05 K/UL (ref 0–0.3)
IMM GRANULOCYTES NFR BLD: 0 %
KETONES UR STRIP-MCNC: NEGATIVE MG/DL
LEUKOCYTE ESTERASE UR QL STRIP: NEGATIVE
LIPASE SERPL-CCNC: 50 U/L (ref 13–60)
LYMPHOCYTES NFR BLD: 1.09 K/UL (ref 1.1–3.7)
LYMPHOCYTES RELATIVE PERCENT: 7 % (ref 24–43)
MCH RBC QN AUTO: 30.5 PG (ref 25.2–33.5)
MCHC RBC AUTO-ENTMCNC: 33.4 G/DL (ref 28.4–34.8)
MCV RBC AUTO: 91.2 FL (ref 82.6–102.9)
MONOCYTES NFR BLD: 0.91 K/UL (ref 0.1–1.2)
MONOCYTES NFR BLD: 6 % (ref 3–12)
NEUTROPHILS NFR BLD: 86 % (ref 36–65)
NEUTS SEG NFR BLD: 13.21 K/UL (ref 1.5–8.1)
NITRITE UR QL STRIP: NEGATIVE
NRBC BLD-RTO: 0 PER 100 WBC
PH UR STRIP: 5.5 [PH] (ref 5–8)
PLATELET # BLD AUTO: 183 K/UL (ref 138–453)
PMV BLD AUTO: 12.1 FL (ref 8.1–13.5)
POTASSIUM SERPL-SCNC: 4.4 MMOL/L (ref 3.7–5.3)
PROT SERPL-MCNC: 7.2 G/DL (ref 6.6–8.7)
PROT UR STRIP-MCNC: ABNORMAL MG/DL
RBC # BLD AUTO: 4.53 M/UL (ref 3.95–5.11)
RBC #/AREA URNS HPF: NORMAL /HPF (ref 0–4)
SODIUM SERPL-SCNC: 134 MMOL/L (ref 136–145)
SP GR UR STRIP: 1.04 (ref 1–1.03)
TROPONIN I SERPL HS-MCNC: 13 NG/L (ref 0–14)
TROPONIN I SERPL HS-MCNC: 13 NG/L (ref 0–14)
UROBILINOGEN UR STRIP-ACNC: NORMAL EU/DL (ref 0–1)
WBC #/AREA URNS HPF: NORMAL /HPF (ref 0–5)
WBC OTHER # BLD: 15.5 K/UL (ref 3.5–11.3)

## 2024-06-01 PROCEDURE — 81001 URINALYSIS AUTO W/SCOPE: CPT

## 2024-06-01 PROCEDURE — 99285 EMERGENCY DEPT VISIT HI MDM: CPT

## 2024-06-01 PROCEDURE — 80076 HEPATIC FUNCTION PANEL: CPT

## 2024-06-01 PROCEDURE — 94640 AIRWAY INHALATION TREATMENT: CPT

## 2024-06-01 PROCEDURE — 94660 CPAP INITIATION&MGMT: CPT

## 2024-06-01 PROCEDURE — 71045 X-RAY EXAM CHEST 1 VIEW: CPT

## 2024-06-01 PROCEDURE — G0378 HOSPITAL OBSERVATION PER HR: HCPCS

## 2024-06-01 PROCEDURE — 6360000002 HC RX W HCPCS

## 2024-06-01 PROCEDURE — 82947 ASSAY GLUCOSE BLOOD QUANT: CPT

## 2024-06-01 PROCEDURE — 6370000000 HC RX 637 (ALT 250 FOR IP)

## 2024-06-01 PROCEDURE — 83690 ASSAY OF LIPASE: CPT

## 2024-06-01 PROCEDURE — 80048 BASIC METABOLIC PNL TOTAL CA: CPT

## 2024-06-01 PROCEDURE — 96375 TX/PRO/DX INJ NEW DRUG ADDON: CPT

## 2024-06-01 PROCEDURE — 93005 ELECTROCARDIOGRAM TRACING: CPT

## 2024-06-01 PROCEDURE — 2580000003 HC RX 258

## 2024-06-01 PROCEDURE — 84484 ASSAY OF TROPONIN QUANT: CPT

## 2024-06-01 PROCEDURE — 85025 COMPLETE CBC W/AUTO DIFF WBC: CPT

## 2024-06-01 PROCEDURE — 2700000000 HC OXYGEN THERAPY PER DAY

## 2024-06-01 PROCEDURE — 96374 THER/PROPH/DIAG INJ IV PUSH: CPT

## 2024-06-01 RX ORDER — INSULIN LISPRO 100 [IU]/ML
0-16 INJECTION, SOLUTION INTRAVENOUS; SUBCUTANEOUS
Status: DISCONTINUED | OUTPATIENT
Start: 2024-06-01 | End: 2024-06-04 | Stop reason: HOSPADM

## 2024-06-01 RX ORDER — ACETAMINOPHEN 650 MG/1
650 SUPPOSITORY RECTAL EVERY 6 HOURS PRN
Status: DISCONTINUED | OUTPATIENT
Start: 2024-06-01 | End: 2024-06-04 | Stop reason: HOSPADM

## 2024-06-01 RX ORDER — INSULIN GLARGINE 100 [IU]/ML
35 INJECTION, SOLUTION SUBCUTANEOUS NIGHTLY
Status: DISCONTINUED | OUTPATIENT
Start: 2024-06-01 | End: 2024-06-04 | Stop reason: HOSPADM

## 2024-06-01 RX ORDER — DOCUSATE SODIUM 100 MG/1
100 CAPSULE, LIQUID FILLED ORAL 2 TIMES DAILY PRN
Status: DISCONTINUED | OUTPATIENT
Start: 2024-06-01 | End: 2024-06-04 | Stop reason: HOSPADM

## 2024-06-01 RX ORDER — SODIUM CHLORIDE 9 MG/ML
INJECTION, SOLUTION INTRAVENOUS PRN
Status: DISCONTINUED | OUTPATIENT
Start: 2024-06-01 | End: 2024-06-04 | Stop reason: HOSPADM

## 2024-06-01 RX ORDER — ALOGLIPTIN 12.5 MG/1
12.5 TABLET, FILM COATED ORAL DAILY
Status: DISCONTINUED | OUTPATIENT
Start: 2024-06-01 | End: 2024-06-04 | Stop reason: HOSPADM

## 2024-06-01 RX ORDER — GLUCAGON 1 MG/ML
1 KIT INJECTION PRN
Status: DISCONTINUED | OUTPATIENT
Start: 2024-06-01 | End: 2024-06-04 | Stop reason: HOSPADM

## 2024-06-01 RX ORDER — PANTOPRAZOLE SODIUM 40 MG/1
40 TABLET, DELAYED RELEASE ORAL 2 TIMES DAILY
Status: DISCONTINUED | OUTPATIENT
Start: 2024-06-01 | End: 2024-06-04 | Stop reason: HOSPADM

## 2024-06-01 RX ORDER — INSULIN GLARGINE 100 [IU]/ML
50 INJECTION, SOLUTION SUBCUTANEOUS ONCE
Status: COMPLETED | OUTPATIENT
Start: 2024-06-01 | End: 2024-06-01

## 2024-06-01 RX ORDER — ACETAMINOPHEN 325 MG/1
650 TABLET ORAL EVERY 6 HOURS PRN
Status: DISCONTINUED | OUTPATIENT
Start: 2024-06-01 | End: 2024-06-04 | Stop reason: HOSPADM

## 2024-06-01 RX ORDER — ALOGLIPTIN 12.5 MG/1
12.5 TABLET, FILM COATED ORAL ONCE
Status: COMPLETED | OUTPATIENT
Start: 2024-06-01 | End: 2024-06-01

## 2024-06-01 RX ORDER — MIDODRINE HYDROCHLORIDE 5 MG/1
2.5 TABLET ORAL
Status: DISCONTINUED | OUTPATIENT
Start: 2024-06-01 | End: 2024-06-04 | Stop reason: HOSPADM

## 2024-06-01 RX ORDER — UREA 10 %
10 LOTION (ML) TOPICAL NIGHTLY PRN
Status: DISCONTINUED | OUTPATIENT
Start: 2024-06-01 | End: 2024-06-04 | Stop reason: HOSPADM

## 2024-06-01 RX ORDER — BUDESONIDE AND FORMOTEROL FUMARATE DIHYDRATE 80; 4.5 UG/1; UG/1
2 AEROSOL RESPIRATORY (INHALATION)
Status: DISCONTINUED | OUTPATIENT
Start: 2024-06-01 | End: 2024-06-04 | Stop reason: HOSPADM

## 2024-06-01 RX ORDER — ATORVASTATIN CALCIUM 40 MG/1
40 TABLET, FILM COATED ORAL DAILY
Status: DISCONTINUED | OUTPATIENT
Start: 2024-06-01 | End: 2024-06-02

## 2024-06-01 RX ORDER — INSULIN LISPRO 100 [IU]/ML
0-4 INJECTION, SOLUTION INTRAVENOUS; SUBCUTANEOUS NIGHTLY
Status: DISCONTINUED | OUTPATIENT
Start: 2024-06-01 | End: 2024-06-04 | Stop reason: HOSPADM

## 2024-06-01 RX ORDER — DEXTROSE MONOHYDRATE 100 MG/ML
INJECTION, SOLUTION INTRAVENOUS CONTINUOUS PRN
Status: DISCONTINUED | OUTPATIENT
Start: 2024-06-01 | End: 2024-06-04 | Stop reason: HOSPADM

## 2024-06-01 RX ORDER — ASPIRIN 81 MG/1
81 TABLET ORAL DAILY
Status: DISCONTINUED | OUTPATIENT
Start: 2024-06-01 | End: 2024-06-04 | Stop reason: HOSPADM

## 2024-06-01 RX ORDER — INSULIN GLARGINE 100 [IU]/ML
50 INJECTION, SOLUTION SUBCUTANEOUS DAILY
Status: DISCONTINUED | OUTPATIENT
Start: 2024-06-02 | End: 2024-06-04 | Stop reason: HOSPADM

## 2024-06-01 RX ORDER — TOPIRAMATE 100 MG/1
100 TABLET, FILM COATED ORAL NIGHTLY
Status: DISCONTINUED | OUTPATIENT
Start: 2024-06-01 | End: 2024-06-04 | Stop reason: HOSPADM

## 2024-06-01 RX ORDER — OXYCODONE HYDROCHLORIDE AND ACETAMINOPHEN 5; 325 MG/1; MG/1
1 TABLET ORAL ONCE
Status: COMPLETED | OUTPATIENT
Start: 2024-06-01 | End: 2024-06-01

## 2024-06-01 RX ORDER — ISOSORBIDE DINITRATE 10 MG/1
30 TABLET ORAL DAILY
Status: DISCONTINUED | OUTPATIENT
Start: 2024-06-01 | End: 2024-06-04 | Stop reason: HOSPADM

## 2024-06-01 RX ORDER — SODIUM CHLORIDE 0.9 % (FLUSH) 0.9 %
5-40 SYRINGE (ML) INJECTION EVERY 12 HOURS SCHEDULED
Status: DISCONTINUED | OUTPATIENT
Start: 2024-06-01 | End: 2024-06-04 | Stop reason: HOSPADM

## 2024-06-01 RX ORDER — ONDANSETRON 2 MG/ML
4 INJECTION INTRAMUSCULAR; INTRAVENOUS EVERY 6 HOURS PRN
Status: DISCONTINUED | OUTPATIENT
Start: 2024-06-01 | End: 2024-06-04 | Stop reason: HOSPADM

## 2024-06-01 RX ORDER — OXYCODONE HYDROCHLORIDE AND ACETAMINOPHEN 5; 325 MG/1; MG/1
1 TABLET ORAL EVERY 8 HOURS PRN
Status: DISCONTINUED | OUTPATIENT
Start: 2024-06-01 | End: 2024-06-04 | Stop reason: HOSPADM

## 2024-06-01 RX ORDER — NITROGLYCERIN 0.4 MG/1
0.4 TABLET SUBLINGUAL ONCE
Status: DISCONTINUED | OUTPATIENT
Start: 2024-06-01 | End: 2024-06-04 | Stop reason: HOSPADM

## 2024-06-01 RX ORDER — ESCITALOPRAM OXALATE 10 MG/1
20 TABLET ORAL EVERY MORNING
Status: DISCONTINUED | OUTPATIENT
Start: 2024-06-02 | End: 2024-06-04 | Stop reason: HOSPADM

## 2024-06-01 RX ORDER — SODIUM CHLORIDE 0.9 % (FLUSH) 0.9 %
5-40 SYRINGE (ML) INJECTION PRN
Status: DISCONTINUED | OUTPATIENT
Start: 2024-06-01 | End: 2024-06-04 | Stop reason: HOSPADM

## 2024-06-01 RX ORDER — OXYBUTYNIN CHLORIDE 5 MG/1
5 TABLET, EXTENDED RELEASE ORAL DAILY
Status: DISCONTINUED | OUTPATIENT
Start: 2024-06-01 | End: 2024-06-04 | Stop reason: HOSPADM

## 2024-06-01 RX ORDER — ALBUTEROL SULFATE 2.5 MG/3ML
2.5 SOLUTION RESPIRATORY (INHALATION) EVERY 6 HOURS PRN
Status: DISCONTINUED | OUTPATIENT
Start: 2024-06-01 | End: 2024-06-04 | Stop reason: HOSPADM

## 2024-06-01 RX ORDER — ALBUTEROL SULFATE 90 UG/1
2 AEROSOL, METERED RESPIRATORY (INHALATION) 4 TIMES DAILY PRN
Status: DISCONTINUED | OUTPATIENT
Start: 2024-06-01 | End: 2024-06-04 | Stop reason: HOSPADM

## 2024-06-01 RX ORDER — IPRATROPIUM BROMIDE AND ALBUTEROL SULFATE 2.5; .5 MG/3ML; MG/3ML
1 SOLUTION RESPIRATORY (INHALATION) EVERY 4 HOURS PRN
Status: DISCONTINUED | OUTPATIENT
Start: 2024-06-01 | End: 2024-06-04 | Stop reason: HOSPADM

## 2024-06-01 RX ORDER — ONDANSETRON 4 MG/1
4 TABLET, FILM COATED ORAL EVERY 6 HOURS PRN
Status: DISCONTINUED | OUTPATIENT
Start: 2024-06-01 | End: 2024-06-04 | Stop reason: HOSPADM

## 2024-06-01 RX ADMIN — OXYCODONE HYDROCHLORIDE AND ACETAMINOPHEN 1 TABLET: 5; 325 TABLET ORAL at 15:59

## 2024-06-01 RX ADMIN — ASPIRIN 81 MG: 81 TABLET, COATED ORAL at 17:38

## 2024-06-01 RX ADMIN — ALOGLIPTIN 12.5 MG: 12.5 TABLET, FILM COATED ORAL at 13:17

## 2024-06-01 RX ADMIN — HYDROMORPHONE HYDROCHLORIDE 0.25 MG: 1 INJECTION, SOLUTION INTRAMUSCULAR; INTRAVENOUS; SUBCUTANEOUS at 11:50

## 2024-06-01 RX ADMIN — SODIUM CHLORIDE, PRESERVATIVE FREE 10 ML: 5 INJECTION INTRAVENOUS at 20:30

## 2024-06-01 RX ADMIN — MIDODRINE HYDROCHLORIDE 2.5 MG: 5 TABLET ORAL at 17:38

## 2024-06-01 RX ADMIN — TOPIRAMATE 100 MG: 100 TABLET, FILM COATED ORAL at 20:23

## 2024-06-01 RX ADMIN — PANTOPRAZOLE SODIUM 40 MG: 40 TABLET, DELAYED RELEASE ORAL at 20:22

## 2024-06-01 RX ADMIN — DOCUSATE SODIUM 100 MG: 100 CAPSULE, LIQUID FILLED ORAL at 20:23

## 2024-06-01 RX ADMIN — INSULIN GLARGINE 50 UNITS: 100 INJECTION, SOLUTION SUBCUTANEOUS at 12:20

## 2024-06-01 RX ADMIN — ATORVASTATIN CALCIUM 40 MG: 40 TABLET, FILM COATED ORAL at 17:38

## 2024-06-01 RX ADMIN — INSULIN LISPRO 4 UNITS: 100 INJECTION, SOLUTION INTRAVENOUS; SUBCUTANEOUS at 20:33

## 2024-06-01 RX ADMIN — METOPROLOL TARTRATE 12.5 MG: 25 TABLET, FILM COATED ORAL at 20:22

## 2024-06-01 RX ADMIN — TICAGRELOR 90 MG: 90 TABLET ORAL at 20:23

## 2024-06-01 RX ADMIN — ONDANSETRON 4 MG: 2 INJECTION INTRAMUSCULAR; INTRAVENOUS at 20:23

## 2024-06-01 RX ADMIN — BUDESONIDE AND FORMOTEROL FUMARATE DIHYDRATE 2 PUFF: 80; 4.5 AEROSOL RESPIRATORY (INHALATION) at 20:07

## 2024-06-01 RX ADMIN — INSULIN GLARGINE 35 UNITS: 100 INJECTION, SOLUTION SUBCUTANEOUS at 20:36

## 2024-06-01 RX ADMIN — OXYCODONE HYDROCHLORIDE AND ACETAMINOPHEN 1 TABLET: 5; 325 TABLET ORAL at 20:21

## 2024-06-01 RX ADMIN — OXYBUTYNIN CHLORIDE 5 MG: 5 TABLET, EXTENDED RELEASE ORAL at 17:38

## 2024-06-01 RX ADMIN — INSULIN LISPRO 16 UNITS: 100 INJECTION, SOLUTION INTRAVENOUS; SUBCUTANEOUS at 17:20

## 2024-06-01 ASSESSMENT — PAIN DESCRIPTION - DESCRIPTORS
DESCRIPTORS: SHARP
DESCRIPTORS: DISCOMFORT

## 2024-06-01 ASSESSMENT — PAIN SCALES - WONG BAKER
WONGBAKER_NUMERICALRESPONSE: NO HURT

## 2024-06-01 ASSESSMENT — PAIN DESCRIPTION - ORIENTATION
ORIENTATION: LEFT
ORIENTATION: LEFT
ORIENTATION: MID

## 2024-06-01 ASSESSMENT — PAIN DESCRIPTION - FREQUENCY
FREQUENCY: INTERMITTENT
FREQUENCY: INTERMITTENT

## 2024-06-01 ASSESSMENT — HEART SCORE
ECG: NORMAL
ECG: NORMAL

## 2024-06-01 ASSESSMENT — PAIN SCALES - GENERAL
PAINLEVEL_OUTOF10: 6
PAINLEVEL_OUTOF10: 7
PAINLEVEL_OUTOF10: 6
PAINLEVEL_OUTOF10: 8
PAINLEVEL_OUTOF10: 0

## 2024-06-01 ASSESSMENT — PAIN - FUNCTIONAL ASSESSMENT
PAIN_FUNCTIONAL_ASSESSMENT: 0-10
PAIN_FUNCTIONAL_ASSESSMENT: PREVENTS OR INTERFERES SOME ACTIVE ACTIVITIES AND ADLS
PAIN_FUNCTIONAL_ASSESSMENT: ACTIVITIES ARE NOT PREVENTED

## 2024-06-01 ASSESSMENT — PAIN DESCRIPTION - LOCATION
LOCATION: CHEST

## 2024-06-01 ASSESSMENT — PAIN DESCRIPTION - DIRECTION: RADIATING_TOWARDS: DENIES

## 2024-06-01 ASSESSMENT — PAIN DESCRIPTION - ONSET: ONSET: AWAKENED FROM SLEEP

## 2024-06-01 ASSESSMENT — PAIN DESCRIPTION - PAIN TYPE
TYPE: ACUTE PAIN
TYPE: ACUTE PAIN

## 2024-06-01 NOTE — ED PROVIDER NOTES
[facility] ED     Emergency Department     Faculty Attestation        I performed a history and physical examination of the patient and discussed management with the resident. I reviewed the resident’s note and agree with the documented findings and plan of care. Any areas of disagreement are noted on the chart. I was personally present for the key portions of any procedures. I have documented in the chart those procedures where I was not present during the key portions. I have reviewed the emergency nurses triage note. I agree with the chief complaint, past medical history, past surgical history, allergies, medications, social and family history as documented unless otherwise noted below.  For Physician Assistant/ Nurse Practitioner cases/documentation I have personally evaluated this patient and have completed at least one if not all key elements of the E/M (history, physical exam, and MDM). Additional findings are as noted.      Vital Signs: BP: 120/67  Pulse: 99  Respirations: 17  Temp: 98.4 °F (36.9 °C) SpO2: 92 %  PCP:  Carla Chua APRN - CNP  Note Started: 6/1/24, 11:33 AM EDT    Pertinent Comments:     Patient is a 73-year-old female with clear history of CAD with 2 recent stents in the last 4 months with the initial 1 being in the setting of STEMI.    Now here with complaint of chest pain on the left side waking her up from sleep.   No radiation or vomiting or diaphoresis.    Had 1 episode of this that was relieved by nitroglycerin and then recurred  Does intermittently wear home oxygen as well with history of COPD.    Here lungs are clear to station bilateral with a midline trachea heart regular rate and rhythm.   Abdomen is soft/nontender no obvious pulsatile masses palpated.   No obvious swelling lower extremities or calf pain and strong DP pulses palpated equal bilaterally.    Assessment/Plan: Patient with chest pain initially helped by 
Lumbosacral spondylosis without myelopathy, Migraine headache, Mumps, Neuropathy, Obesity, On home oxygen therapy, HIGINIO on CPAP, Otitis media, Secondary diabetes mellitus with stage 3 chronic kidney disease (GFR 30-59) ([facility]), STEMI (ST elevation myocardial infarction) ([facility]), Stroke ([facility]), Tinnitus, Type 2 diabetes mellitus without complication ([facility]), Type II or unspecified type diabetes mellitus without mention of complication, not stated as uncontrolled, UTI (lower urinary tract infection), and Varicose vein.       has a past surgical history that includes Cholecystectomy (); Carpal tunnel release (Right); Tympanoplasty; Dilation & curettage (); Cystocopy (2013); Cataract removal with implant (Bilateral); Tonsillectomy; Incontinence surgery; ablation of dysrhythmic focus (); Upper gastrointestinal endoscopy (2016); eye surgery; Tubal ligation; other surgical history (09/10/15); Insertable Cardiac Monitor (2015); Tympanoplasty; Colonoscopy; Colonoscopy (04/10/2017); pr colsc flx w/removal lesion by hot bx forceps (N/A, 4/10/2017); Tympanostomy tube placement (2017); Upper gastrointestinal endoscopy (N/A, 3/2/2021); Colonoscopy (N/A, 3/2/2021); Cardiac procedure (N/A, 2024); and Cardiac procedure (N/A, 3/11/2024).      Social History     Socioeconomic History    Marital status: Legally      Spouse name: Not on file    Number of children: Not on file    Years of education: Not on file    Highest education level: Not on file   Occupational History    Occupation: disabled     Employer: N/A   Tobacco Use    Smoking status: Former     Current packs/day: 0.00     Average packs/day: 3.0 packs/day for 41.0 years (123.0 ttl pk-yrs)     Types: Cigarettes     Start date: 1959     Quit date: 2000     Years since quittin.4    Smokeless tobacco: Never    Tobacco comments:     quit in    Vaping Use    Vaping Use: Never used   Substance and Sexual Activity    Alcohol use:

## 2024-06-01 NOTE — ED NOTES
ED to inpatient nurses report      Chief Complaint:  Chief Complaint   Patient presents with    Chest Pain     Present to ED from: Home    MOA:     LOC: alert and orientated to name, place, date  Mobility: Requires assistance * 1  Oxygen Baseline: 95    Current needs required: 2L nasal cannula   Pending ED orders: None  Present condition: Stable    Why did the patient come to the ED? Pt arrived to ED from EMS with reports of chest pain since 0500. Prior to EMS arrive pt took 2 nitro which did improve pain. Pt received 324 of ASA for EMS and 4mg of Zofran      VSS, RR equal and non labored, NAD, pt is alert and oriented x4     Call light within reach, pt changed into gown, EKG obtained and Pt placed on telemetry. EMS line flushed a labs drawn     Following plan of care  What is the plan? Obs, Cards  Any procedures or intervention occur? Labs, Imaging   Any safety concerns??    Mental Status:  Level of Consciousness: Alert (0)    Psych Assessment:   Psychosocial  Psychosocial (WDL): Within Defined Limits  Vital signs   Vitals:    06/01/24 1127   BP: 120/67   Pulse: 99   Resp: 17   Temp: 98.4 °F (36.9 °C)   SpO2: 92%   Weight: 99.8 kg (220 lb)   Height: 1.575 m (5' 2\")        Vitals:  Patient Vitals for the past 24 hrs:   BP Temp Pulse Resp SpO2 Height Weight   06/01/24 1127 120/67 98.4 °F (36.9 °C) 99 17 92 % 1.575 m (5' 2\") 99.8 kg (220 lb)      Visit Vitals  /67   Pulse 99   Temp 98.4 °F (36.9 °C)   Resp 17   Ht 1.575 m (5' 2\")   Wt 99.8 kg (220 lb)   SpO2 92%   BMI 40.24 kg/m²        LDAs:   Peripheral IV 06/01/24 Left Antecubital (Active)       Ambulatory Status:  Presents to emergency department  because of falls (Syncope, seizure, or loss of consciousness): No, Age > 70: Yes, Altered Mental Status, Intoxication with alcohol or substance confusion (Disorientation, impaired judgment, poor safety awaremess, or inability to follow instructions): No, Impaired Mobility: Ambulates or transfers with assistive

## 2024-06-01 NOTE — ED TRIAGE NOTES
Pt arrived to ED from EMS with reports of chest pain since 0500. Prior to EMS arrive pt took 2 nitro which did improve pain. Pt received 324 of ASA for EMS and 4mg of Zofran     VSS, RR equal and non labored, NAD, pt is alert and oriented x4    Call light within reach, pt changed into gown, EKG obtained and Pt placed on telemetry. EMS line flushed a labs drawn    Following plan of care

## 2024-06-02 ENCOUNTER — APPOINTMENT (OUTPATIENT)
Dept: CT IMAGING | Age: 74
End: 2024-06-02
Payer: MEDICARE

## 2024-06-02 PROBLEM — I25.10 CORONARY ARTERY DISEASE INVOLVING NATIVE CORONARY ARTERY OF NATIVE HEART WITHOUT ANGINA PECTORIS: Status: ACTIVE | Noted: 2024-03-11

## 2024-06-02 PROBLEM — I73.9 CLAUDICATION (HCC): Status: ACTIVE | Noted: 2024-06-02

## 2024-06-02 PROBLEM — E78.01 FAMILIAL HYPERCHOLESTEROLEMIA: Status: ACTIVE | Noted: 2024-06-02

## 2024-06-02 LAB
ANION GAP SERPL CALCULATED.3IONS-SCNC: 9 MMOL/L (ref 9–16)
BASOPHILS # BLD: 0.04 K/UL (ref 0–0.2)
BASOPHILS NFR BLD: 0 % (ref 0–2)
BUN SERPL-MCNC: 23 MG/DL (ref 8–23)
CALCIUM SERPL-MCNC: 8.7 MG/DL (ref 8.6–10.4)
CHLORIDE SERPL-SCNC: 95 MMOL/L (ref 98–107)
CO2 SERPL-SCNC: 27 MMOL/L (ref 20–31)
CREAT SERPL-MCNC: 1.4 MG/DL (ref 0.5–0.9)
D DIMER PPP FEU-MCNC: 2.67 UG/ML FEU (ref 0–0.57)
EOSINOPHIL # BLD: 0.12 K/UL (ref 0–0.44)
EOSINOPHILS RELATIVE PERCENT: 1 % (ref 1–4)
ERYTHROCYTE [DISTWIDTH] IN BLOOD BY AUTOMATED COUNT: 12.9 % (ref 11.8–14.4)
GFR, ESTIMATED: 38 ML/MIN/1.73M2
GLUCOSE BLD-MCNC: 261 MG/DL (ref 65–105)
GLUCOSE BLD-MCNC: 281 MG/DL (ref 65–105)
GLUCOSE BLD-MCNC: 294 MG/DL (ref 65–105)
GLUCOSE BLD-MCNC: 328 MG/DL (ref 65–105)
GLUCOSE SERPL-MCNC: 283 MG/DL (ref 74–99)
HCT VFR BLD AUTO: 39.6 % (ref 36.3–47.1)
HGB BLD-MCNC: 12.9 G/DL (ref 11.9–15.1)
IMM GRANULOCYTES # BLD AUTO: 0.05 K/UL (ref 0–0.3)
IMM GRANULOCYTES NFR BLD: 1 %
LYMPHOCYTES NFR BLD: 1.47 K/UL (ref 1.1–3.7)
LYMPHOCYTES RELATIVE PERCENT: 15 % (ref 24–43)
MCH RBC QN AUTO: 30.1 PG (ref 25.2–33.5)
MCHC RBC AUTO-ENTMCNC: 32.6 G/DL (ref 28.4–34.8)
MCV RBC AUTO: 92.5 FL (ref 82.6–102.9)
MONOCYTES NFR BLD: 0.76 K/UL (ref 0.1–1.2)
MONOCYTES NFR BLD: 8 % (ref 3–12)
NEUTROPHILS NFR BLD: 76 % (ref 36–65)
NEUTS SEG NFR BLD: 7.56 K/UL (ref 1.5–8.1)
NRBC BLD-RTO: 0 PER 100 WBC
PLATELET # BLD AUTO: 168 K/UL (ref 138–453)
PMV BLD AUTO: 12.4 FL (ref 8.1–13.5)
POTASSIUM SERPL-SCNC: 4.3 MMOL/L (ref 3.7–5.3)
RBC # BLD AUTO: 4.28 M/UL (ref 3.95–5.11)
SODIUM SERPL-SCNC: 131 MMOL/L (ref 136–145)
WBC OTHER # BLD: 10 K/UL (ref 3.5–11.3)

## 2024-06-02 PROCEDURE — 36415 COLL VENOUS BLD VENIPUNCTURE: CPT

## 2024-06-02 PROCEDURE — 6370000000 HC RX 637 (ALT 250 FOR IP)

## 2024-06-02 PROCEDURE — G0378 HOSPITAL OBSERVATION PER HR: HCPCS

## 2024-06-02 PROCEDURE — 93005 ELECTROCARDIOGRAM TRACING: CPT

## 2024-06-02 PROCEDURE — 2580000003 HC RX 258

## 2024-06-02 PROCEDURE — 94660 CPAP INITIATION&MGMT: CPT

## 2024-06-02 PROCEDURE — 85025 COMPLETE CBC W/AUTO DIFF WBC: CPT

## 2024-06-02 PROCEDURE — 71260 CT THORAX DX C+: CPT

## 2024-06-02 PROCEDURE — 6360000004 HC RX CONTRAST MEDICATION

## 2024-06-02 PROCEDURE — 99222 1ST HOSP IP/OBS MODERATE 55: CPT | Performed by: INTERNAL MEDICINE

## 2024-06-02 PROCEDURE — 85379 FIBRIN DEGRADATION QUANT: CPT

## 2024-06-02 PROCEDURE — 94640 AIRWAY INHALATION TREATMENT: CPT

## 2024-06-02 PROCEDURE — 96375 TX/PRO/DX INJ NEW DRUG ADDON: CPT

## 2024-06-02 PROCEDURE — 82947 ASSAY GLUCOSE BLOOD QUANT: CPT

## 2024-06-02 PROCEDURE — 80048 BASIC METABOLIC PNL TOTAL CA: CPT

## 2024-06-02 PROCEDURE — 96376 TX/PRO/DX INJ SAME DRUG ADON: CPT

## 2024-06-02 PROCEDURE — 6370000000 HC RX 637 (ALT 250 FOR IP): Performed by: STUDENT IN AN ORGANIZED HEALTH CARE EDUCATION/TRAINING PROGRAM

## 2024-06-02 PROCEDURE — 6360000002 HC RX W HCPCS

## 2024-06-02 RX ORDER — ROSUVASTATIN CALCIUM 20 MG/1
40 TABLET, COATED ORAL NIGHTLY
Status: DISCONTINUED | OUTPATIENT
Start: 2024-06-02 | End: 2024-06-04 | Stop reason: HOSPADM

## 2024-06-02 RX ORDER — 0.9 % SODIUM CHLORIDE 0.9 %
250 INTRAVENOUS SOLUTION INTRAVENOUS ONCE
Status: COMPLETED | OUTPATIENT
Start: 2024-06-02 | End: 2024-06-02

## 2024-06-02 RX ORDER — DIPHENHYDRAMINE HYDROCHLORIDE 50 MG/ML
50 INJECTION INTRAMUSCULAR; INTRAVENOUS ONCE
Status: COMPLETED | OUTPATIENT
Start: 2024-06-02 | End: 2024-06-02

## 2024-06-02 RX ADMIN — MIDODRINE HYDROCHLORIDE 2.5 MG: 5 TABLET ORAL at 11:16

## 2024-06-02 RX ADMIN — SODIUM CHLORIDE, PRESERVATIVE FREE 10 ML: 5 INJECTION INTRAVENOUS at 08:18

## 2024-06-02 RX ADMIN — SODIUM CHLORIDE 250 ML: 0.9 INJECTION, SOLUTION INTRAVENOUS at 11:46

## 2024-06-02 RX ADMIN — INSULIN GLARGINE 35 UNITS: 100 INJECTION, SOLUTION SUBCUTANEOUS at 19:57

## 2024-06-02 RX ADMIN — IOPAMIDOL 75 ML: 755 INJECTION, SOLUTION INTRAVENOUS at 20:33

## 2024-06-02 RX ADMIN — INSULIN LISPRO 8 UNITS: 100 INJECTION, SOLUTION INTRAVENOUS; SUBCUTANEOUS at 08:16

## 2024-06-02 RX ADMIN — MIDODRINE HYDROCHLORIDE 2.5 MG: 5 TABLET ORAL at 08:11

## 2024-06-02 RX ADMIN — INSULIN LISPRO 8 UNITS: 100 INJECTION, SOLUTION INTRAVENOUS; SUBCUTANEOUS at 11:19

## 2024-06-02 RX ADMIN — ALOGLIPTIN 12.5 MG: 12.5 TABLET, FILM COATED ORAL at 08:11

## 2024-06-02 RX ADMIN — PANTOPRAZOLE SODIUM 40 MG: 40 TABLET, DELAYED RELEASE ORAL at 20:03

## 2024-06-02 RX ADMIN — METOPROLOL TARTRATE 12.5 MG: 25 TABLET, FILM COATED ORAL at 08:11

## 2024-06-02 RX ADMIN — ROSUVASTATIN CALCIUM 40 MG: 20 TABLET, FILM COATED ORAL at 20:03

## 2024-06-02 RX ADMIN — BUDESONIDE AND FORMOTEROL FUMARATE DIHYDRATE 2 PUFF: 80; 4.5 AEROSOL RESPIRATORY (INHALATION) at 08:28

## 2024-06-02 RX ADMIN — WATER 40 MG: 1 INJECTION INTRAMUSCULAR; INTRAVENOUS; SUBCUTANEOUS at 19:59

## 2024-06-02 RX ADMIN — ASPIRIN 81 MG: 81 TABLET, COATED ORAL at 08:11

## 2024-06-02 RX ADMIN — ATORVASTATIN CALCIUM 40 MG: 40 TABLET, FILM COATED ORAL at 08:11

## 2024-06-02 RX ADMIN — INSULIN LISPRO 8 UNITS: 100 INJECTION, SOLUTION INTRAVENOUS; SUBCUTANEOUS at 17:26

## 2024-06-02 RX ADMIN — OXYCODONE HYDROCHLORIDE AND ACETAMINOPHEN 1 TABLET: 5; 325 TABLET ORAL at 08:13

## 2024-06-02 RX ADMIN — TICAGRELOR 90 MG: 90 TABLET ORAL at 21:41

## 2024-06-02 RX ADMIN — Medication 10 MG: at 21:41

## 2024-06-02 RX ADMIN — INSULIN LISPRO 4 UNITS: 100 INJECTION, SOLUTION INTRAVENOUS; SUBCUTANEOUS at 19:55

## 2024-06-02 RX ADMIN — ESCITALOPRAM OXALATE 20 MG: 10 TABLET ORAL at 08:12

## 2024-06-02 RX ADMIN — INSULIN GLARGINE 50 UNITS: 100 INJECTION, SOLUTION SUBCUTANEOUS at 08:16

## 2024-06-02 RX ADMIN — WATER 40 MG: 1 INJECTION INTRAMUSCULAR; INTRAVENOUS; SUBCUTANEOUS at 15:51

## 2024-06-02 RX ADMIN — MIDODRINE HYDROCHLORIDE 2.5 MG: 5 TABLET ORAL at 17:26

## 2024-06-02 RX ADMIN — PANTOPRAZOLE SODIUM 40 MG: 40 TABLET, DELAYED RELEASE ORAL at 08:11

## 2024-06-02 RX ADMIN — TICAGRELOR 90 MG: 90 TABLET ORAL at 08:11

## 2024-06-02 RX ADMIN — OXYBUTYNIN CHLORIDE 5 MG: 5 TABLET, EXTENDED RELEASE ORAL at 08:11

## 2024-06-02 RX ADMIN — ISOSORBIDE DINITRATE 30 MG: 10 TABLET ORAL at 08:11

## 2024-06-02 RX ADMIN — SODIUM CHLORIDE, PRESERVATIVE FREE 10 ML: 5 INJECTION INTRAVENOUS at 19:58

## 2024-06-02 RX ADMIN — BUDESONIDE AND FORMOTEROL FUMARATE DIHYDRATE 2 PUFF: 80; 4.5 AEROSOL RESPIRATORY (INHALATION) at 21:22

## 2024-06-02 RX ADMIN — TOPIRAMATE 100 MG: 100 TABLET, FILM COATED ORAL at 21:41

## 2024-06-02 RX ADMIN — DIPHENHYDRAMINE HYDROCHLORIDE 50 MG: 50 INJECTION INTRAMUSCULAR; INTRAVENOUS at 17:26

## 2024-06-02 ASSESSMENT — PAIN SCALES - WONG BAKER
WONGBAKER_NUMERICALRESPONSE: NO HURT
WONGBAKER_NUMERICALRESPONSE: HURTS EVEN MORE
WONGBAKER_NUMERICALRESPONSE: NO HURT

## 2024-06-02 ASSESSMENT — PAIN SCALES - GENERAL
PAINLEVEL_OUTOF10: 0
PAINLEVEL_OUTOF10: 7
PAINLEVEL_OUTOF10: 7

## 2024-06-02 ASSESSMENT — PAIN DESCRIPTION - DESCRIPTORS: DESCRIPTORS: THROBBING

## 2024-06-02 ASSESSMENT — PAIN DESCRIPTION - LOCATION: LOCATION: HEAD

## 2024-06-02 NOTE — PLAN OF CARE
Problem: Chronic Conditions and Co-morbidities  Goal: Patient's chronic conditions and co-morbidity symptoms are monitored and maintained or improved  6/2/2024 1946 by Eduarda Elizabeth RN  Outcome: Progressing  6/2/2024 1501 by Tri Bynum RN  Outcome: Progressing     Problem: Discharge Planning  Goal: Discharge to home or other facility with appropriate resources  6/2/2024 1946 by Eduarda Elizabeth RN  Outcome: Progressing  6/2/2024 1501 by Tri Bynum RN  Outcome: Progressing     Problem: Pain  Goal: Verbalizes/displays adequate comfort level or baseline comfort level  6/2/2024 1946 by Eduarda Elizabeth RN  Outcome: Progressing  6/2/2024 1501 by Tri Bynum RN  Outcome: Progressing     Problem: Safety - Adult  Goal: Free from fall injury  6/2/2024 1946 by Eduarda Elizabeth RN  Outcome: Progressing  Flowsheets (Taken 6/2/2024 1945)  Free From Fall Injury: Instruct family/caregiver on patient safety  6/2/2024 1501 by Tri Bynum RN  Outcome: Progressing     Problem: ABCDS Injury Assessment  Goal: Absence of physical injury  Outcome: Progressing

## 2024-06-02 NOTE — PROGRESS NOTES
[facility]  CDU / OBSERVATION ENCOUNTER  ATTENDING NOTE       I performed a history and physical examination of the patient and discussed management with the resident or midlevel provider. I reviewed the resident or midlevel provider's note and agree with the documented findings and plan of care. Any areas of disagreement are noted on the chart. I was personally present for the key portions of any procedures. I have documented in the chart those procedures where I was not present during the key portions. I have reviewed the nurses notes. I agree with the chief complaint, past medical history, past surgical history, allergies, medications, social and family history as documented unless otherwise noted below.    The Family history, social history, and ROS are effectively unchanged since admission unless noted elsewhere in the chart.    This patient was placed in the observation unit for reevaluation for possible admission to the hospital    Patient admitted for chest discomfort.  Patient has had recent cardiac workup and stenting.  Patient was admitted for cardiology evaluation.  Cardiologist has seen patient and does not feel further workup is necessary as inpatient here.  Search for thromboembolic causes is been suggested and will be pursued.  Patient had D-dimer which was elevated and CT scan of chest has been ordered.    Patient actually describes claudication to lower extremities and states that she feels weak and like her legs may be giving out.  Patient describes soreness in complaints of fatigue when walking or moving but does not have symptoms at rest.      Concern for peripheral vascular disease and believe the patient would be best served with further testing while here.  This is not available today we will order ABIs with flow studies.  PE will be ruled out in the meantime.    David Gleason MD  Attending Emergency  Physician

## 2024-06-02 NOTE — PROGRESS NOTES
Observation resident on call notified via perfect serve that patient bs 379, 4 units lispro given from night sliding scale and Lantus 35 units orders nightly given. Also patient c/o pain 7-8 out of 10 and tylenol ordered , koko requested for home medications of Percocet 5/325q8 hours. New orders placed for percocet 5/3251 tablet every 8 hours and given. Patient requested melatonin 10mg nightly as she states she takes at home nightly. New orders placed for Melatonin 10mg nightly.

## 2024-06-02 NOTE — PROGRESS NOTES
[facility]  CDU / OBSERVATION ENCOUNTER  ATTENDING NOTE       Patient is found to be hypotensive.  Patient is not symptomatic with this.  This was incidental.  Patient has normal heart rate.    Patient does have pressures that tend to run low and is on midodrine.  Additional dose of midodrine given.  Small bolus of fluids given.    Will reassess.  Patient is currently comfortable    David Gleason MD  Attending Emergency  Physician

## 2024-06-02 NOTE — H&P
Anxiety, Arthritis, Asthma, Atrial fibrillation ([facility]), Back pain, Benign hypertension with CKD (chronic kidney disease) stage III ([facility]), CAD (coronary artery disease), Caffeine use, CHF (congestive heart failure) ([facility]), Chronic kidney disease, CKD (chronic kidney disease) stage 3, GFR 30-59 ml/min ([facility]), COPD (chronic obstructive pulmonary disease) ([facility]), Degeneration of lumbar or lumbosacral intervertebral disc, Depression, DM (diabetes mellitus) ([facility]), Emphysema of lung ([facility]), Gastritis, GERD (gastroesophageal reflux disease), Hearing loss, Hematuria, Hiatal hernia, History of loop recorder, HTN (hypertension), benign, Hypertriglyceridemia, Incontinence of urine, Irritable bowel syndrome with both constipation and diarrhea, Kidney stone, Knee arthropathy, Long term (current) use of anticoagulants, Lumbosacral spondylosis without myelopathy, Migraine headache, Mumps, Neuropathy, Obesity, On home oxygen therapy, HIGINIO on CPAP, Otitis media, Secondary diabetes mellitus with stage 3 chronic kidney disease (GFR 30-59) ([facility]), STEMI (ST elevation myocardial infarction) ([facility]), Stroke ([facility]), Tinnitus, Type 2 diabetes mellitus without complication ([facility]), Type II or unspecified type diabetes mellitus without mention of complication, not stated as uncontrolled, UTI (lower urinary tract infection), and Varicose vein.    I have reviewed the past medical history with the patient and it is pertinent to this complaint.      SURGICAL HISTORY      has a past surgical history that includes Cholecystectomy (2007); Carpal tunnel release (Right); Tympanoplasty; Dilation & curettage (1979); Cystocopy (9/25/2013); Cataract removal with implant (Bilateral); Tonsillectomy; Incontinence surgery; ablation of dysrhythmic focus (2000); Upper gastrointestinal endoscopy (03/2016); eye surgery; Tubal ligation; other surgical history (09/10/15); Insertable Cardiac Monitor (12/04/2015); Tympanoplasty; Colonoscopy; Colonoscopy (04/10/2017); pr colsc

## 2024-06-02 NOTE — PROGRESS NOTES
1135- nurse notified of pt being hypotensive. Sent perfect serve to OBS resident. See new orders. Will recheck bp after bolus     1300- pt still hypotensive after bolus obs resident notified

## 2024-06-02 NOTE — CARE COORDINATION
DISCHARGE PLANNING EVALUATION: OP/OBSERVATION        6/2/24, 12:49 PM EDT    Mariangel Abreu         Location: OBS 20/20-1   Reason for hospitalization: Chest pain [R07.9]  Chest pain, unspecified type [R07.9]     CM Services requested for transitional needs.     PCP: Carla Chua APRN - CNP    Transportation/Food Security/Housekeeping Addressed:  No issues identified.     Equipment needs: none; hplan is to return home independent w/  and he will transport home as cane, power chair and rollator    Case Management Services Information Letter Provided [x]    Transition plan: plan is to return home independent and may need transport home. Attends cardiac rehab at [facility] thru 8/2024

## 2024-06-02 NOTE — CONSULTS
smokeless tobacco. She reports that she does not drink alcohol and does not use drugs.     Family History: family history includes Breast Cancer in her maternal aunt; Cancer in her father; Diabetes in her maternal grandmother and mother; Emphysema in her sister; Heart Disease in her mother; Stomach Cancer in her father.     Review of Systems   Constitutional:  Negative for diaphoresis and fever.   Respiratory:  Negative for cough and shortness of breath.    Cardiovascular:  Positive for chest pain. Negative for palpitations and leg swelling.   Gastrointestinal:  Negative for anal bleeding, constipation and diarrhea.   Skin:  Negative for rash and wound.   Neurological:  Negative for weakness and light-headedness.        Neuropathic pain in legs         PHYSICAL EXAM:      /64   Pulse 95   Temp 97.9 °F (36.6 °C) (Oral)   Resp 18   Ht 1.575 m (5' 2\")   Wt 101.3 kg (223 lb 5.2 oz)   LMP  (LMP Unknown)   SpO2 97%   BMI 40.85 kg/m²      Physical Exam  Constitutional:       General: She is not in acute distress.     Appearance: Normal appearance. She is obese. She is not ill-appearing.   Cardiovascular:      Rate and Rhythm: Normal rate and regular rhythm.      Pulses: Normal pulses.   Pulmonary:      Effort: Pulmonary effort is normal. No respiratory distress.      Breath sounds: Normal breath sounds. No wheezing.   Abdominal:      General: There is no distension.      Tenderness: There is no abdominal tenderness.   Musculoskeletal:         General: Tenderness (bilateral calf) present. No swelling.      Right lower leg: No edema.      Left lower leg: No edema.   Skin:     Capillary Refill: Capillary refill takes less than 2 seconds.      Findings: No bruising or lesion.   Neurological:      General: No focal deficit present.      Mental Status: She is alert and oriented to person, place, and time.      Cranial Nerves: No cranial nerve deficit.      Sensory: No sensory deficit.      Motor: No weakness.

## 2024-06-03 ENCOUNTER — APPOINTMENT (OUTPATIENT)
Dept: VASCULAR LAB | Age: 74
End: 2024-06-03
Payer: MEDICARE

## 2024-06-03 ENCOUNTER — HOSPITAL ENCOUNTER (OUTPATIENT)
Dept: CARDIAC REHAB | Age: 74
Setting detail: THERAPIES SERIES
Discharge: HOME | End: 2024-06-03

## 2024-06-03 DIAGNOSIS — N39.3 STRESS INCONTINENCE: ICD-10-CM

## 2024-06-03 LAB
ANION GAP SERPL CALCULATED.3IONS-SCNC: 10 MMOL/L (ref 9–16)
BASOPHILS # BLD: <0.03 K/UL (ref 0–0.2)
BASOPHILS NFR BLD: 0 % (ref 0–2)
BUN SERPL-MCNC: 31 MG/DL (ref 8–23)
CALCIUM SERPL-MCNC: 8.5 MG/DL (ref 8.6–10.4)
CHLORIDE SERPL-SCNC: 97 MMOL/L (ref 98–107)
CO2 SERPL-SCNC: 23 MMOL/L (ref 20–31)
CREAT SERPL-MCNC: 1.2 MG/DL (ref 0.5–0.9)
EOSINOPHIL # BLD: <0.03 K/UL (ref 0–0.44)
EOSINOPHILS RELATIVE PERCENT: 0 % (ref 1–4)
ERYTHROCYTE [DISTWIDTH] IN BLOOD BY AUTOMATED COUNT: 12.7 % (ref 11.8–14.4)
GFR, ESTIMATED: 46 ML/MIN/1.73M2
GLUCOSE BLD-MCNC: 240 MG/DL (ref 65–105)
GLUCOSE BLD-MCNC: 292 MG/DL (ref 65–105)
GLUCOSE BLD-MCNC: 367 MG/DL (ref 65–105)
GLUCOSE BLD-MCNC: 453 MG/DL (ref 65–105)
GLUCOSE SERPL-MCNC: 478 MG/DL (ref 74–99)
HCT VFR BLD AUTO: 40.6 % (ref 36.3–47.1)
HGB BLD-MCNC: 12.8 G/DL (ref 11.9–15.1)
IMM GRANULOCYTES # BLD AUTO: 0.08 K/UL (ref 0–0.3)
IMM GRANULOCYTES NFR BLD: 1 %
LYMPHOCYTES NFR BLD: 0.71 K/UL (ref 1.1–3.7)
LYMPHOCYTES RELATIVE PERCENT: 8 % (ref 24–43)
MCH RBC QN AUTO: 30.2 PG (ref 25.2–33.5)
MCHC RBC AUTO-ENTMCNC: 31.5 G/DL (ref 28.4–34.8)
MCV RBC AUTO: 95.8 FL (ref 82.6–102.9)
MONOCYTES NFR BLD: 0.1 K/UL (ref 0.1–1.2)
MONOCYTES NFR BLD: 1 % (ref 3–12)
NEUTROPHILS NFR BLD: 90 % (ref 36–65)
NEUTS SEG NFR BLD: 8.33 K/UL (ref 1.5–8.1)
NRBC BLD-RTO: 0 PER 100 WBC
PLATELET # BLD AUTO: 146 K/UL (ref 138–453)
PMV BLD AUTO: 12 FL (ref 8.1–13.5)
POTASSIUM SERPL-SCNC: 4.6 MMOL/L (ref 3.7–5.3)
RBC # BLD AUTO: 4.24 M/UL (ref 3.95–5.11)
SODIUM SERPL-SCNC: 130 MMOL/L (ref 136–145)
WBC OTHER # BLD: 9.2 K/UL (ref 3.5–11.3)

## 2024-06-03 PROCEDURE — 6370000000 HC RX 637 (ALT 250 FOR IP)

## 2024-06-03 PROCEDURE — 93925 LOWER EXTREMITY STUDY: CPT

## 2024-06-03 PROCEDURE — 80048 BASIC METABOLIC PNL TOTAL CA: CPT

## 2024-06-03 PROCEDURE — G0378 HOSPITAL OBSERVATION PER HR: HCPCS

## 2024-06-03 PROCEDURE — 6370000000 HC RX 637 (ALT 250 FOR IP): Performed by: STUDENT IN AN ORGANIZED HEALTH CARE EDUCATION/TRAINING PROGRAM

## 2024-06-03 PROCEDURE — 2580000003 HC RX 258

## 2024-06-03 PROCEDURE — 94660 CPAP INITIATION&MGMT: CPT

## 2024-06-03 PROCEDURE — 85025 COMPLETE CBC W/AUTO DIFF WBC: CPT

## 2024-06-03 PROCEDURE — 82947 ASSAY GLUCOSE BLOOD QUANT: CPT

## 2024-06-03 PROCEDURE — 94640 AIRWAY INHALATION TREATMENT: CPT

## 2024-06-03 PROCEDURE — 2700000000 HC OXYGEN THERAPY PER DAY

## 2024-06-03 PROCEDURE — 36415 COLL VENOUS BLD VENIPUNCTURE: CPT

## 2024-06-03 RX ORDER — OXYBUTYNIN CHLORIDE 5 MG/1
TABLET, EXTENDED RELEASE ORAL
Qty: 30 TABLET | Refills: 11 | Status: SHIPPED | OUTPATIENT
Start: 2024-06-03

## 2024-06-03 RX ORDER — ESCITALOPRAM OXALATE 20 MG/1
TABLET ORAL
Qty: 30 TABLET | Refills: 11 | Status: SHIPPED | OUTPATIENT
Start: 2024-06-03

## 2024-06-03 RX ORDER — TOPIRAMATE 100 MG/1
TABLET, FILM COATED ORAL
Qty: 30 TABLET | Refills: 11 | Status: SHIPPED | OUTPATIENT
Start: 2024-06-03

## 2024-06-03 RX ORDER — PANTOPRAZOLE SODIUM 40 MG/1
TABLET, DELAYED RELEASE ORAL
Qty: 60 TABLET | Refills: 11 | Status: SHIPPED | OUTPATIENT
Start: 2024-06-03

## 2024-06-03 RX ORDER — MIDODRINE HYDROCHLORIDE 2.5 MG/1
TABLET ORAL
Qty: 90 TABLET | Refills: 11 | Status: SHIPPED | OUTPATIENT
Start: 2024-06-03

## 2024-06-03 RX ADMIN — OXYCODONE HYDROCHLORIDE AND ACETAMINOPHEN 1 TABLET: 5; 325 TABLET ORAL at 09:26

## 2024-06-03 RX ADMIN — PANTOPRAZOLE SODIUM 40 MG: 40 TABLET, DELAYED RELEASE ORAL at 20:54

## 2024-06-03 RX ADMIN — ALOGLIPTIN 12.5 MG: 12.5 TABLET, FILM COATED ORAL at 09:22

## 2024-06-03 RX ADMIN — OXYBUTYNIN CHLORIDE 5 MG: 5 TABLET, EXTENDED RELEASE ORAL at 09:22

## 2024-06-03 RX ADMIN — ROSUVASTATIN CALCIUM 40 MG: 20 TABLET, FILM COATED ORAL at 20:54

## 2024-06-03 RX ADMIN — Medication 10 MG: at 20:54

## 2024-06-03 RX ADMIN — TOPIRAMATE 100 MG: 100 TABLET, FILM COATED ORAL at 20:54

## 2024-06-03 RX ADMIN — DOCUSATE SODIUM 100 MG: 100 CAPSULE, LIQUID FILLED ORAL at 20:54

## 2024-06-03 RX ADMIN — INSULIN LISPRO 16 UNITS: 100 INJECTION, SOLUTION INTRAVENOUS; SUBCUTANEOUS at 06:27

## 2024-06-03 RX ADMIN — MIDODRINE HYDROCHLORIDE 2.5 MG: 5 TABLET ORAL at 17:41

## 2024-06-03 RX ADMIN — TICAGRELOR 90 MG: 90 TABLET ORAL at 20:54

## 2024-06-03 RX ADMIN — ESCITALOPRAM OXALATE 20 MG: 10 TABLET ORAL at 09:22

## 2024-06-03 RX ADMIN — INSULIN GLARGINE 50 UNITS: 100 INJECTION, SOLUTION SUBCUTANEOUS at 09:22

## 2024-06-03 RX ADMIN — SODIUM CHLORIDE, PRESERVATIVE FREE 10 ML: 5 INJECTION INTRAVENOUS at 09:27

## 2024-06-03 RX ADMIN — ISOSORBIDE DINITRATE 30 MG: 10 TABLET ORAL at 09:22

## 2024-06-03 RX ADMIN — INSULIN GLARGINE 35 UNITS: 100 INJECTION, SOLUTION SUBCUTANEOUS at 20:54

## 2024-06-03 RX ADMIN — ACETAMINOPHEN 650 MG: 325 TABLET ORAL at 20:56

## 2024-06-03 RX ADMIN — INSULIN LISPRO 16 UNITS: 100 INJECTION, SOLUTION INTRAVENOUS; SUBCUTANEOUS at 12:41

## 2024-06-03 RX ADMIN — BUDESONIDE AND FORMOTEROL FUMARATE DIHYDRATE 2 PUFF: 80; 4.5 AEROSOL RESPIRATORY (INHALATION) at 21:29

## 2024-06-03 RX ADMIN — PANTOPRAZOLE SODIUM 40 MG: 40 TABLET, DELAYED RELEASE ORAL at 09:22

## 2024-06-03 RX ADMIN — BUDESONIDE AND FORMOTEROL FUMARATE DIHYDRATE 2 PUFF: 80; 4.5 AEROSOL RESPIRATORY (INHALATION) at 07:44

## 2024-06-03 RX ADMIN — SODIUM CHLORIDE, PRESERVATIVE FREE 10 ML: 5 INJECTION INTRAVENOUS at 21:05

## 2024-06-03 RX ADMIN — MIDODRINE HYDROCHLORIDE 2.5 MG: 5 TABLET ORAL at 09:22

## 2024-06-03 RX ADMIN — MIDODRINE HYDROCHLORIDE 2.5 MG: 5 TABLET ORAL at 12:41

## 2024-06-03 RX ADMIN — INSULIN LISPRO 8 UNITS: 100 INJECTION, SOLUTION INTRAVENOUS; SUBCUTANEOUS at 17:40

## 2024-06-03 RX ADMIN — ASPIRIN 81 MG: 81 TABLET, COATED ORAL at 09:22

## 2024-06-03 RX ADMIN — TICAGRELOR 90 MG: 90 TABLET ORAL at 09:22

## 2024-06-03 ASSESSMENT — PAIN SCALES - WONG BAKER

## 2024-06-03 ASSESSMENT — PAIN DESCRIPTION - LOCATION
LOCATION: CHEST

## 2024-06-03 ASSESSMENT — PAIN SCALES - GENERAL
PAINLEVEL_OUTOF10: 8
PAINLEVEL_OUTOF10: 8
PAINLEVEL_OUTOF10: 0
PAINLEVEL_OUTOF10: 6

## 2024-06-03 ASSESSMENT — PAIN - FUNCTIONAL ASSESSMENT: PAIN_FUNCTIONAL_ASSESSMENT: ACTIVITIES ARE NOT PREVENTED

## 2024-06-03 ASSESSMENT — PAIN DESCRIPTION - DESCRIPTORS: DESCRIPTORS: DULL

## 2024-06-03 ASSESSMENT — PAIN DESCRIPTION - ORIENTATION: ORIENTATION: MID

## 2024-06-03 NOTE — DISCHARGE SUMMARY
albuterol (2.5 MG/3ML) 0.083% nebulizer solution  Commonly known as: PROVENTIL     * albuterol sulfate  (90 Base) MCG/ACT inhaler  Commonly known as: Ventolin HFA  Inhale 2 puffs into the lungs 4 times daily as needed for Wheezing     alogliptin 12.5 MG Tabs tablet  Commonly known as: NESINA  Take 1 tablet by mouth daily     aspirin 81 MG EC tablet  Take 1 tablet by mouth daily     docusate sodium 100 MG capsule  Commonly known as: COLACE  Take 1 capsule by mouth 2 times daily Take 1 pill twice a day as needed for constipation     fluticasone furoate-vilanterol 100-25 MCG/ACT inhaler  Commonly known as: Breo Ellipta  Inhale 1 puff into the lungs daily     FreeStyle Paula 2 [place]  Apply  with paula sensor daily and as needed     FreeStyle Paula 2 Sensor Misc  Apply to arm once every 2 weeks.     Incontinence Supplies Misc  Disposable incontinence liner pad, to change every 4 hours as needed for urinary incontinence     Incontinence Supply Disposable Misc  Disposable justin pad to change 2 times a day     ipratropium 0.5 mg-albuterol 2.5 mg 0.5-2.5 (3) MG/3ML Soln nebulizer solution  Commonly known as: DUONEB     isosorbide dinitrate 30 MG tablet  Commonly known as: ISORDIL  Take 1 tablet by mouth daily     Lantus SoloStar 100 UNIT/ML injection pen  Generic drug: insulin glargine  Inject 50 Units into the skin every morning AND 35 Units nightly.     NovoLOG FlexPen 100 UNIT/ML injection pen  Generic drug: insulin aspart  IF<139 NO INSULIN; 140-199-2 UN;200-249-4 UN;250-299-6 UN;300-349-8 UN;350-400=10 UN;ABOVE 400-12 UNIT(S)     oxyCODONE-acetaminophen 5-325 MG per tablet  Commonly known as: Percocet  Take 1 tablet by mouth every 8 hours as needed for Pain for up to 30 days. Intended supply: 30 days.     OXYGEN     ticagrelor 90 MG Tabs tablet  Commonly known as: BRILINTA  Take 1 tablet by mouth 2 times daily     UltiCare Mini Pen Needles 31G X 6 MM Misc  Generic drug: Insulin Pen Needle  Inject 3

## 2024-06-03 NOTE — PROGRESS NOTES
[facility]  CDU / OBSERVATION eNCOUnter  Attending NOte       I performed a history and physical examination of the patient and discussed management with the resident.  This patient was placed in the observation unit for reevaluation for possible admission to the hospital. I reviewed the resident’s note and agree with the documented findings and plan of care. Any areas of disagreement are noted on the chart. I was personally present for the key portions of any procedures. I have documented in the chart those procedures where I was not present during the key portions. I have reviewed the nurses notes. I agree with the chief complaint, past medical history, past surgical history, allergies, medications, social and family history as documented unless otherwise noted below.    The patient was placed in the observation unit for reevaluation for possible admission to the hospital.      The Family history, social history, and ROS are effectively unchanged since admission unless noted elsewhere in the chart.    Appreciate cardiology recommendations. CTA chest negative for PE. Patient reporting claudication symptoms. Plan for ABIs with flow studies today.     Francine Sanz MD  Attending Emergency  Physician

## 2024-06-03 NOTE — PROGRESS NOTES
OBS/CDU   RESIDENT NOTE      Patients PCP is:  Carla Chua, APRN - [place]      SUBJECTIVE      No acute events overnight. Has been able to tolerate a full diet without nausea or vomiting. The patient is urinating on her own and is passing flatus. Denies fever, chills, nausea, vomiting, chest pain, shortness of breath, abdominal pain, focal weakness, numbness, tingling, urinary/bowel symptoms, vision changes, visual hallucinations, or headache.    Had an episode of hypotension with a blood pressure in the upper 70s yesterday afternoon.  Held Lopressor, continued midodrine, patient became normotensive.      PHYSICAL EXAM      General: NAD, AO X 3  Heent: EMOI, PERRL, on 2 L/min NC  Neck: SUPPLE, NO JVD  Cardiovascular: RRR, S1S2  Pulmonary: CTAB, NO SOB  Abdomen: SOFT, NTTP, ND, +BS  Extremities: +2/4 PULSES DISTAL, NO SWELLING  Neuro / Psych: NO NUMBNESS OR TINGLING, MENTATION AT BASELINE    PERTINENT TEST /EXAMS      I have reviewed all available laboratory results.    MEDICATIONS CURRENT   rosuvastatin (CRESTOR) tablet 40 mg, Nightly  methylPREDNISolone sodium succ (SOLU-MEDROL) 40 mg in sterile water 1 mL injection, Daily  nitroGLYCERIN (NITROSTAT) SL tablet 0.4 mg, Once  sodium chloride flush 0.9 % injection 5-40 mL, 2 times per day  sodium chloride flush 0.9 % injection 5-40 mL, PRN  0.9 % sodium chloride infusion, PRN  acetaminophen (TYLENOL) tablet 650 mg, Q6H PRN   Or  acetaminophen (TYLENOL) suppository 650 mg, Q6H PRN  ondansetron (ZOFRAN) tablet 4 mg, Q6H PRN   Or  ondansetron (ZOFRAN) injection 4 mg, Q6H PRN  insulin lispro (HUMALOG,ADMELOG) injection vial 0-16 Units, TID WC  insulin lispro (HUMALOG,ADMELOG) injection vial 0-4 Units, Nightly  insulin glargine (LANTUS) injection vial 35 Units, Nightly  insulin glargine (LANTUS) injection vial 50 Units, Daily  glucose chewable tablet 16 g, PRN  dextrose bolus 10% 125 mL, PRN   Or  dextrose bolus 10% 250 mL, PRN  glucagon injection 1 mg, PRN  dextrose 10 %

## 2024-06-03 NOTE — DISCHARGE INSTRUCTIONS
- Continue taking home medication  - Schedule a follow-up appointment with your primary care physician as needed  - You had an MANJEET test of both legs which showed normal blood flow  - Recommend following up with vascular surgery after this test, contact information is provided

## 2024-06-04 ENCOUNTER — CARE COORDINATION (OUTPATIENT)
Dept: CARE COORDINATION | Age: 74
End: 2024-06-04

## 2024-06-04 VITALS
WEIGHT: 224.65 LBS | OXYGEN SATURATION: 96 % | HEART RATE: 69 BPM | DIASTOLIC BLOOD PRESSURE: 72 MMHG | TEMPERATURE: 98.2 F | BODY MASS INDEX: 41.34 KG/M2 | RESPIRATION RATE: 18 BRPM | HEIGHT: 62 IN | SYSTOLIC BLOOD PRESSURE: 118 MMHG

## 2024-06-04 LAB
ANION GAP SERPL CALCULATED.3IONS-SCNC: 10 MMOL/L (ref 9–16)
BASOPHILS # BLD: 0.05 K/UL (ref 0–0.2)
BASOPHILS NFR BLD: 1 % (ref 0–2)
BUN SERPL-MCNC: 30 MG/DL (ref 8–23)
CALCIUM SERPL-MCNC: 8.6 MG/DL (ref 8.6–10.4)
CHLORIDE SERPL-SCNC: 102 MMOL/L (ref 98–107)
CO2 SERPL-SCNC: 26 MMOL/L (ref 20–31)
CREAT SERPL-MCNC: 1.3 MG/DL (ref 0.5–0.9)
ECHO BSA: 2.1 M2
EKG ATRIAL RATE: 80 BPM
EKG ATRIAL RATE: 99 BPM
EKG P AXIS: 68 DEGREES
EKG P AXIS: 70 DEGREES
EKG P-R INTERVAL: 132 MS
EKG P-R INTERVAL: 142 MS
EKG Q-T INTERVAL: 326 MS
EKG Q-T INTERVAL: 342 MS
EKG QRS DURATION: 66 MS
EKG QRS DURATION: 70 MS
EKG QTC CALCULATION (BAZETT): 394 MS
EKG QTC CALCULATION (BAZETT): 418 MS
EKG R AXIS: -52 DEGREES
EKG R AXIS: -55 DEGREES
EKG T AXIS: 100 DEGREES
EKG T AXIS: 95 DEGREES
EKG VENTRICULAR RATE: 80 BPM
EKG VENTRICULAR RATE: 99 BPM
EOSINOPHIL # BLD: 0.14 K/UL (ref 0–0.44)
EOSINOPHILS RELATIVE PERCENT: 1 % (ref 1–4)
ERYTHROCYTE [DISTWIDTH] IN BLOOD BY AUTOMATED COUNT: 12.8 % (ref 11.8–14.4)
GFR, ESTIMATED: 42 ML/MIN/1.73M2
GLUCOSE BLD-MCNC: 153 MG/DL (ref 65–105)
GLUCOSE BLD-MCNC: 191 MG/DL (ref 65–105)
GLUCOSE SERPL-MCNC: 187 MG/DL (ref 74–99)
HCT VFR BLD AUTO: 35.4 % (ref 36.3–47.1)
HGB BLD-MCNC: 11.1 G/DL (ref 11.9–15.1)
IMM GRANULOCYTES # BLD AUTO: 0.05 K/UL (ref 0–0.3)
IMM GRANULOCYTES NFR BLD: 1 %
LYMPHOCYTES NFR BLD: 1.86 K/UL (ref 1.1–3.7)
LYMPHOCYTES RELATIVE PERCENT: 18 % (ref 24–43)
MCH RBC QN AUTO: 29.8 PG (ref 25.2–33.5)
MCHC RBC AUTO-ENTMCNC: 31.4 G/DL (ref 28.4–34.8)
MCV RBC AUTO: 94.9 FL (ref 82.6–102.9)
MONOCYTES NFR BLD: 0.85 K/UL (ref 0.1–1.2)
MONOCYTES NFR BLD: 8 % (ref 3–12)
NEUTROPHILS NFR BLD: 71 % (ref 36–65)
NEUTS SEG NFR BLD: 7.19 K/UL (ref 1.5–8.1)
NRBC BLD-RTO: 0 PER 100 WBC
PLATELET # BLD AUTO: 152 K/UL (ref 138–453)
PMV BLD AUTO: 11.7 FL (ref 8.1–13.5)
POTASSIUM SERPL-SCNC: 3.8 MMOL/L (ref 3.7–5.3)
RBC # BLD AUTO: 3.73 M/UL (ref 3.95–5.11)
SODIUM SERPL-SCNC: 138 MMOL/L (ref 136–145)
VAS LEFT ABI: 1.19
VAS LEFT ARM BP: 108 MMHG
VAS LEFT ATA MID PSV: 105 CM/S
VAS LEFT CFA PROX PSV: 148 CM/S
VAS LEFT DORSALIS PEDIS BP: 134 MMHG
VAS LEFT PERONEAL MID PSV: 71.5 CM/S
VAS LEFT PFA PROX PSV: 96.2 CM/S
VAS LEFT POP A DIST PSV: 73.5 CM/S
VAS LEFT POP A PROX PSV: 79 CM/S
VAS LEFT POP A PROX VEL RATIO: 0.99
VAS LEFT PTA BP: 105 MMHG
VAS LEFT PTA MID PSV: 70.8 CM/S
VAS LEFT SFA DIST PSV: 80.1 CM/S
VAS LEFT SFA DIST VEL RATIO: 0.69
VAS LEFT SFA MID PSV: 116 CM/S
VAS LEFT SFA MID VEL RATIO: 0.94
VAS LEFT SFA PROX PSV: 123 CM/S
VAS LEFT SFA PROX VEL RATIO: 0.83
VAS LEFT TBI: 1.27
VAS LEFT TOE PRESSURE: 144 MMHG
VAS RIGHT ABI: 1.17
VAS RIGHT ARM BP: 113 MMHG
VAS RIGHT ATA MID PSV: 75.1 CM/S
VAS RIGHT CFA PROX PSV: 136 CM/S
VAS RIGHT DORSALIS PEDIS BP: 132 MMHG
VAS RIGHT PERONEAL MID PSV: 40.5 CM/S
VAS RIGHT PFA PROX PSV: 96.2 CM/S
VAS RIGHT POP A DIST PSV: 92.7 CM/S
VAS RIGHT POP A PROX PSV: 81.2 CM/S
VAS RIGHT POP A PROX VEL RATIO: 1.05
VAS RIGHT PTA BP: 120 MMHG
VAS RIGHT PTA MID PSV: 35.6 CM/S
VAS RIGHT SFA DIST PSV: 77.4 CM/S
VAS RIGHT SFA DIST VEL RATIO: 0.74
VAS RIGHT SFA MID PSV: 104 CM/S
VAS RIGHT SFA MID VEL RATIO: 0.7
VAS RIGHT SFA PROX PSV: 149 CM/S
VAS RIGHT SFA PROX VEL RATIO: 1.1
VAS RIGHT TBI: 1.18
VAS RIGHT TOE PRESSURE: 133 MMHG
WBC OTHER # BLD: 10.1 K/UL (ref 3.5–11.3)

## 2024-06-04 PROCEDURE — 6370000000 HC RX 637 (ALT 250 FOR IP)

## 2024-06-04 PROCEDURE — 80048 BASIC METABOLIC PNL TOTAL CA: CPT

## 2024-06-04 PROCEDURE — G0378 HOSPITAL OBSERVATION PER HR: HCPCS

## 2024-06-04 PROCEDURE — 82947 ASSAY GLUCOSE BLOOD QUANT: CPT

## 2024-06-04 PROCEDURE — 2580000003 HC RX 258

## 2024-06-04 PROCEDURE — 85025 COMPLETE CBC W/AUTO DIFF WBC: CPT

## 2024-06-04 PROCEDURE — 36415 COLL VENOUS BLD VENIPUNCTURE: CPT

## 2024-06-04 RX ORDER — ROSUVASTATIN CALCIUM 40 MG/1
40 TABLET, COATED ORAL NIGHTLY
Qty: 30 TABLET | Refills: 3 | Status: SHIPPED | OUTPATIENT
Start: 2024-06-04

## 2024-06-04 RX ADMIN — OXYCODONE HYDROCHLORIDE AND ACETAMINOPHEN 1 TABLET: 5; 325 TABLET ORAL at 07:39

## 2024-06-04 RX ADMIN — ALOGLIPTIN 12.5 MG: 12.5 TABLET, FILM COATED ORAL at 09:07

## 2024-06-04 RX ADMIN — BUDESONIDE AND FORMOTEROL FUMARATE DIHYDRATE 2 PUFF: 80; 4.5 AEROSOL RESPIRATORY (INHALATION) at 09:22

## 2024-06-04 RX ADMIN — TICAGRELOR 90 MG: 90 TABLET ORAL at 09:06

## 2024-06-04 RX ADMIN — ESCITALOPRAM OXALATE 20 MG: 10 TABLET ORAL at 09:06

## 2024-06-04 RX ADMIN — ASPIRIN 81 MG: 81 TABLET, COATED ORAL at 09:07

## 2024-06-04 RX ADMIN — PANTOPRAZOLE SODIUM 40 MG: 40 TABLET, DELAYED RELEASE ORAL at 09:06

## 2024-06-04 RX ADMIN — MIDODRINE HYDROCHLORIDE 2.5 MG: 5 TABLET ORAL at 09:06

## 2024-06-04 RX ADMIN — INSULIN GLARGINE 50 UNITS: 100 INJECTION, SOLUTION SUBCUTANEOUS at 09:07

## 2024-06-04 RX ADMIN — ISOSORBIDE DINITRATE 30 MG: 10 TABLET ORAL at 09:06

## 2024-06-04 RX ADMIN — SODIUM CHLORIDE, PRESERVATIVE FREE 10 ML: 5 INJECTION INTRAVENOUS at 09:12

## 2024-06-04 RX ADMIN — OXYBUTYNIN CHLORIDE 5 MG: 5 TABLET, EXTENDED RELEASE ORAL at 09:05

## 2024-06-04 ASSESSMENT — PAIN DESCRIPTION - ORIENTATION: ORIENTATION: MID

## 2024-06-04 ASSESSMENT — PAIN SCALES - WONG BAKER

## 2024-06-04 ASSESSMENT — PAIN DESCRIPTION - LOCATION: LOCATION: CHEST

## 2024-06-04 ASSESSMENT — PAIN - FUNCTIONAL ASSESSMENT: PAIN_FUNCTIONAL_ASSESSMENT: ACTIVITIES ARE NOT PREVENTED

## 2024-06-04 ASSESSMENT — PAIN DESCRIPTION - DESCRIPTORS: DESCRIPTORS: THROBBING

## 2024-06-04 ASSESSMENT — PAIN SCALES - GENERAL: PAINLEVEL_OUTOF10: 8

## 2024-06-04 NOTE — PROGRESS NOTES
OBS/CDU   RESIDENT NOTE      Patients PCP is:  Carla Chua, APRN - CNP      SUBJECTIVE      No acute events overnight.  ABIs obtained yesterday were normal.  Has been able to tolerate a full diet without nausea or vomiting. The patient is urinating on her own and is passing flatus. Denies fever, chills, nausea, vomiting, chest pain, shortness of breath, abdominal pain, focal weakness, numbness, tingling, urinary/bowel symptoms, vision changes, visual hallucinations, or headache.    PHYSICAL EXAM      General: NAD, AO X 3  Heent: EMOI, PERRL, on 2 L/min NC  Neck: SUPPLE, NO JVD  Cardiovascular: RRR, S1S2  Pulmonary: CTAB, NO SOB  Abdomen: SOFT, NTTP, ND, +BS  Extremities: +2/4 PULSES DISTAL, NO SWELLING  Neuro / Psych: NO NUMBNESS OR TINGLING, MENTATION AT BASELINE    PERTINENT TEST /EXAMS      I have reviewed all available laboratory results.    MEDICATIONS CURRENT   rosuvastatin (CRESTOR) tablet 40 mg, Nightly  nitroGLYCERIN (NITROSTAT) SL tablet 0.4 mg, Once  sodium chloride flush 0.9 % injection 5-40 mL, 2 times per day  sodium chloride flush 0.9 % injection 5-40 mL, PRN  0.9 % sodium chloride infusion, PRN  acetaminophen (TYLENOL) tablet 650 mg, Q6H PRN   Or  acetaminophen (TYLENOL) suppository 650 mg, Q6H PRN  ondansetron (ZOFRAN) tablet 4 mg, Q6H PRN   Or  ondansetron (ZOFRAN) injection 4 mg, Q6H PRN  insulin lispro (HUMALOG,ADMELOG) injection vial 0-16 Units, TID WC  insulin lispro (HUMALOG,ADMELOG) injection vial 0-4 Units, Nightly  insulin glargine (LANTUS) injection vial 35 Units, Nightly  insulin glargine (LANTUS) injection vial 50 Units, Daily  glucose chewable tablet 16 g, PRN  dextrose bolus 10% 125 mL, PRN   Or  dextrose bolus 10% 250 mL, PRN  glucagon injection 1 mg, PRN  dextrose 10 % infusion, Continuous PRN  albuterol (PROVENTIL) (2.5 MG/3ML) 0.083% nebulizer solution 2.5 mg, Q6H PRN  albuterol sulfate HFA (PROVENTIL;VENTOLIN;PROAIR) 108 (90 Base) MCG/ACT inhaler 2 puff, 4x Daily

## 2024-06-04 NOTE — PLAN OF CARE
Problem: Chronic Conditions and Co-morbidities  Goal: Patient's chronic conditions and co-morbidity symptoms are monitored and maintained or improved  6/4/2024 1026 by Ritika Diallo RN  Outcome: Adequate for Discharge  6/4/2024 1024 by Ritika Diallo RN  Outcome: Progressing     Problem: Discharge Planning  Goal: Discharge to home or other facility with appropriate resources  6/4/2024 1026 by Ritika Diallo RN  Outcome: Adequate for Discharge  6/4/2024 1024 by Ritika Diallo RN  Outcome: Progressing     Problem: Pain  Goal: Verbalizes/displays adequate comfort level or baseline comfort level  6/4/2024 1026 by Ritika Diallo RN  Outcome: Adequate for Discharge  6/4/2024 1024 by Ritika Diallo RN  Outcome: Progressing     Problem: Safety - Adult  Goal: Free from fall injury  6/4/2024 1026 by Ritika Diallo RN  Outcome: Adequate for Discharge  6/4/2024 1026 by Ritika Diallo RN  Outcome: Progressing  6/4/2024 1024 by Ritika Diallo RN  Outcome: Progressing     Problem: ABCDS Injury Assessment  Goal: Absence of physical injury  6/4/2024 1026 by Ritika Diallo RN  Outcome: Adequate for Discharge  6/4/2024 1026 by Ritika Diallo RN  Outcome: Progressing  6/4/2024 1024 by Ritika Diallo RN  Outcome: Progressing

## 2024-06-04 NOTE — PLAN OF CARE
Problem: Chronic Conditions and Co-morbidities  Goal: Patient's chronic conditions and co-morbidity symptoms are monitored and maintained or improved  Outcome: Progressing     Problem: Discharge Planning  Goal: Discharge to home or other facility with appropriate resources  Outcome: Progressing     Problem: Pain  Goal: Verbalizes/displays adequate comfort level or baseline comfort level  Outcome: Progressing     Problem: Safety - Adult  Goal: Free from fall injury  6/4/2024 1026 by Ritika Diallo RN  Outcome: Progressing  6/4/2024 1024 by Ritika Diallo RN  Outcome: Progressing     Problem: ABCDS Injury Assessment  Goal: Absence of physical injury  6/4/2024 1026 by Ritika Diallo RN  Outcome: Progressing  6/4/2024 1024 by Ritika Diallo RN  Outcome: Progressing

## 2024-06-04 NOTE — CARE COORDINATION
Epic in basket indicates that patient is still in [facility] cardiac Observation. discharge possibly today. Will F/U tomorrow

## 2024-06-05 ENCOUNTER — HOSPITAL ENCOUNTER (OUTPATIENT)
Dept: CARDIAC REHAB | Age: 74
Setting detail: THERAPIES SERIES
Discharge: HOME | End: 2024-06-05

## 2024-06-05 ENCOUNTER — CARE COORDINATION (OUTPATIENT)
Dept: CARE COORDINATION | Age: 74
End: 2024-06-05

## 2024-06-05 NOTE — PROGRESS NOTES
[facility]  CDU / OBSERVATION ENCOUNTER  ATTENDING NOTE       I performed a history and physical examination of the patient and discussed management with the resident or midlevel provider. I reviewed the resident or midlevel provider's note and agree with the documented findings and plan of care. Any areas of disagreement are noted on the chart. I was personally present for the key portions of any procedures. I have documented in the chart those procedures where I was not present during the key portions. I have reviewed the nurses notes. I agree with the chief complaint, past medical history, past surgical history, allergies, medications, social and family history as documented unless otherwise noted below.    The Family history, social history, and ROS are effectively unchanged since admission unless noted elsewhere in the chart.    This patient was placed in the observation unit for reevaluation for possible admission to the hospital    Patient effectively at baseline.  Patient has had fatigue and had been her legs weak.  Patient had arterial studies done which were negative.  Patient was reassessed today and was feeling well.  Patient has been cleared by cardiology.  Patient is improved and ready for home.  Further workup as outpatient with PCP.  Discussed cholesterol management with patient.  Patient discharged in good condition.    David Gleason MD  Attending Emergency  Physician

## 2024-06-05 NOTE — CARE COORDINATION
[facility] IP Discharge Follow up Call    Summary:    Date of discharge: 6/4/2024  Facility: [facility]  Non-face-to-face services provided:  Obtained and reviewed discharge summary and/or continuity of care documents    Reason for Hospital Visit:  CP  Discharged with Home Health?:  No    Date of first visit after discharge:  6/10/24  Status:     improved    Did you receive a discharge summary with list of medication from the hospital? Yes  Review of Instructions:     Understands what to report/when to return?:  Yes   Understands discharge instructions?:  Yes   Following discharge instructions?:  Yes   If not why?   Is there any lingering symptoms? No  Are you eating and drinking OK? Yes  Any other problems i.e. Constipation, other symptoms?  No  Are there any new complaints of pain? No  If you have a wound is the dressing clean, dry, and intact? N/A  Understands wound care regimen? N/A  Are there any other complaints/concerns that you wish to tell your provider?      FU appts/Provider:    Future Appointments   Date Time Provider Department Center   6/10/2024  8:00 AM ST[facility] CARD REHAB RM 01 [facility]   6/11/2024  9:40 AM Prabha Hammond APRN - CNP [facility]   6/12/2024  8:00 AM STC CARD REHAB RM 01 [facility]   6/17/2024  8:00 AM [facility] CARD REHAB RM 01 [facility]   6/18/2024  9:45 AM Carla Chua APRN - CNP [facility]   6/19/2024  8:00 AM [facility] CARD REHAB RM 01 [facility]   6/24/2024  8:00 AM [facility] CARD REHAB RM 01 [facility]   6/26/2024  8:00 AM ST[facility] CARD REHAB RM 01 [facility]   7/1/2024  8:00 AM [facility] CARD REHAB RM 01 [facility] CARD[facility]   7/3/2024  8:00 AM ST[facility] CARD REHAB RM 01 [facility]   7/8/2024  8:00 AM ST[facility] CARD REHAB RM 01 [facility]ARD[facility]   7/10/2024  8:00 AM ST[facility] CARD REHAB RM 01 [facility] CARD[facility]   7/15/2024  8:00 AM ST[facility] CARD REHAB RM 01 [facility] CARDIAC [facility]  It was a pleasure taking care of you at [facility] Emergency Department today. We know that when you come to [facility], you are entrusting us with your health, comfort, and safety. Our physicians and nurses honor that trust, and we truly appreciate the opportunity to care for you and your loved ones. We also value our feedback. If you receive a survey about your Emergency Department experience today, please fill it out. We care about our patients' feedback, and we listen to what you have to say.   Thank you! z

## 2024-06-10 ENCOUNTER — HOSPITAL ENCOUNTER (OUTPATIENT)
Dept: CARDIAC REHAB | Age: 74
Setting detail: THERAPIES SERIES
Discharge: HOME | End: 2024-06-10
Payer: MEDICARE

## 2024-06-10 NOTE — PROGRESS NOTES
P/C from pt, she will not be in for cardiac rehab today. \"Something got screwed up with the cab company and I don't have a ride. I need to get it figured out.\" Appt r/s

## 2024-06-11 ENCOUNTER — HOSPITAL ENCOUNTER (OUTPATIENT)
Dept: PAIN MANAGEMENT | Age: 74
Discharge: HOME | End: 2024-06-11
Payer: MEDICARE

## 2024-06-11 VITALS — HEIGHT: 62 IN | WEIGHT: 224 LBS | BODY MASS INDEX: 41.22 KG/M2

## 2024-06-11 DIAGNOSIS — M51.36 DDD (DEGENERATIVE DISC DISEASE), LUMBAR: ICD-10-CM

## 2024-06-11 DIAGNOSIS — Z79.891 CHRONIC USE OF OPIATE FOR THERAPEUTIC PURPOSE: ICD-10-CM

## 2024-06-11 DIAGNOSIS — M46.1 SACROILIITIS, NOT ELSEWHERE CLASSIFIED (HCC): Chronic | ICD-10-CM

## 2024-06-11 DIAGNOSIS — M54.42 CHRONIC BILATERAL LOW BACK PAIN WITH BILATERAL SCIATICA: Chronic | ICD-10-CM

## 2024-06-11 DIAGNOSIS — M54.41 CHRONIC BILATERAL LOW BACK PAIN WITH BILATERAL SCIATICA: Chronic | ICD-10-CM

## 2024-06-11 DIAGNOSIS — M47.817 LUMBOSACRAL SPONDYLOSIS WITHOUT MYELOPATHY: Primary | Chronic | ICD-10-CM

## 2024-06-11 DIAGNOSIS — G89.29 CHRONIC BILATERAL LOW BACK PAIN WITH BILATERAL SCIATICA: Chronic | ICD-10-CM

## 2024-06-11 DIAGNOSIS — M54.16 LUMBAR RADICULOPATHY, CHRONIC: ICD-10-CM

## 2024-06-11 DIAGNOSIS — M50.30 DDD (DEGENERATIVE DISC DISEASE), CERVICAL: ICD-10-CM

## 2024-06-11 PROCEDURE — 99214 OFFICE O/P EST MOD 30 MIN: CPT | Performed by: NURSE PRACTITIONER

## 2024-06-11 PROCEDURE — G0481 DRUG TEST DEF 8-14 CLASSES: HCPCS

## 2024-06-11 PROCEDURE — 99213 OFFICE O/P EST LOW 20 MIN: CPT

## 2024-06-11 PROCEDURE — 80307 DRUG TEST PRSMV CHEM ANLYZR: CPT

## 2024-06-11 RX ORDER — OXYCODONE HYDROCHLORIDE AND ACETAMINOPHEN 5; 325 MG/1; MG/1
1 TABLET ORAL EVERY 8 HOURS PRN
Qty: 90 TABLET | Refills: 0 | Status: SHIPPED | OUTPATIENT
Start: 2024-06-15 | End: 2024-07-15

## 2024-06-11 ASSESSMENT — ENCOUNTER SYMPTOMS
BOWEL INCONTINENCE: 0
BACK PAIN: 1
COUGH: 0
CONSTIPATION: 0
SHORTNESS OF BREATH: 0

## 2024-06-11 NOTE — PROGRESS NOTES
Chief Complaint   Patient presents with    Back Pain     Med refill         PMH     atrial fibrillation, CKD stage III, CHF, COPD, DM CVA MI with 4 stents and on brilinta  chronic hypoxic respiratory failure and HIGINIO on CPAP - has narcan 4/16/2024     Pt c/o low back pain that radiates down legs. No known injury or surgery to the area with onset more than 1 year ago and has progressively worsened  MRI 10- with multilevel degenerative changes and Right paracentral disc extrusion L4-5 narrowing the right lateral recess.   She is not interested in seeing NS for opinion and declines to update imaging even though reporting worsening leg weakness and falls.    Patient states that she had a bad experience with an injection in the past and is not interested in any interventional pain procedures      Recent admission for CP    HPI:     Back Pain  This is a chronic problem. The current episode started more than 1 year ago. The problem occurs constantly. The problem is unchanged. The pain is present in the lumbar spine. The quality of the pain is described as aching. The pain radiates to the left knee, left thigh, right knee, right thigh, right foot and left foot. The pain is at a severity of 9/10. The pain is severe. The pain is Worse during the night. The symptoms are aggravated by sitting. Stiffness is present In the morning. Associated symptoms include headaches, leg pain, numbness and weakness. Pertinent negatives include no bladder incontinence, bowel incontinence, chest pain, fever or tingling. Risk factors include menopause, obesity, poor posture, sedentary lifestyle and lack of exercise. She has tried heat, ice and analgesics for the symptoms. The treatment provided mild relief.         Patient denies any new neurological symptoms. No bowel or bladder incontinence, no weakness, and no falling.    Pill count: appropriate Percocet- 15 6/15    Morphine equivalent: 22.5    Controlled Substance

## 2024-06-12 ENCOUNTER — HOSPITAL ENCOUNTER (OUTPATIENT)
Dept: CARDIAC REHAB | Age: 74
Setting detail: THERAPIES SERIES
Discharge: HOME | End: 2024-06-12
Payer: MEDICARE

## 2024-06-12 ENCOUNTER — CARE COORDINATION (OUTPATIENT)
Dept: CARE COORDINATION | Age: 74
End: 2024-06-12

## 2024-06-12 VITALS — BODY MASS INDEX: 41.34 KG/M2 | WEIGHT: 226 LBS

## 2024-06-12 PROCEDURE — 93798 PHYS/QHP OP CAR RHAB W/ECG: CPT

## 2024-06-12 ASSESSMENT — EXERCISE STRESS TEST
PEAK_BP: 102/58
PEAK_RPE: 13
PEAK_METS: 2.7
PEAK_HR: 70

## 2024-06-12 NOTE — CARE COORDINATION
Patient graduated from the High Risk Care Management program on 6/12/2024.  Patient has the ability to self manage at this time..  Care management goals have been completed. No further Ambulatory Care Manager follow up scheduled.

## 2024-06-14 LAB
6-ACETYLMORPHINE, UR: NOT DETECTED
7-AMINOCLONAZEPAM, URINE: NOT DETECTED
ALPHA-OH-ALPRAZ, URINE: NOT DETECTED
ALPHA-OH-MIDAZOLAM, URINE: NOT DETECTED
ALPRAZOLAM, URINE: NOT DETECTED
AMPHETAMINE, URINE: NOT DETECTED
BARBITURATES, URINE: NEGATIVE
BENZOYLECGONINE, UR: NEGATIVE
BUPRENORPHINE URINE: NOT DETECTED
CARISOPRODOL, UR: NEGATIVE
CLONAZEPAM, URINE: NOT DETECTED
CODEINE, URINE: NOT DETECTED
CREAT UR-MCNC: 114.9 MG/DL (ref 20–400)
DIAZEPAM, URINE: NOT DETECTED
DRUGS EXPECTED, UR: NORMAL
EER HI RES INTERP UR: NORMAL
ETHYL GLUCURONIDE UR: NEGATIVE
FENTANYL URINE: NOT DETECTED
GABAPENTIN: NOT DETECTED
HYDROCODONE, URINE: NOT DETECTED
HYDROMORPHONE, URINE: NOT DETECTED
LORAZEPAM, URINE: NOT DETECTED
MARIJUANA METAB, UR: NEGATIVE
MDA, UR: NOT DETECTED
MDEA, EVE, UR: NOT DETECTED
MDMA URINE: NOT DETECTED
MEPERIDINE METAB, UR: NOT DETECTED
METHADONE, URINE: NEGATIVE
METHAMPHETAMINE, URINE: NOT DETECTED
METHYLPHENIDATE: NOT DETECTED
MIDAZOLAM, URINE: NOT DETECTED
MORPHINE, OPI1M: NOT DETECTED
NALOXONE URINE: NOT DETECTED
NORBUPRENORPHINE, URINE: NOT DETECTED
NORDIAZEPAM, URINE: NOT DETECTED
NORFENTANYL, URINE: NOT DETECTED
NORHYDROCODONE, URINE: NOT DETECTED
NOROXYCODONE, URINE: PRESENT
NOROXYMORPHONE, URINE: NOT DETECTED
OXAZEPAM, URINE: NOT DETECTED
OXYCODONE URINE: PRESENT
OXYMORPHONE, URINE: PRESENT
PAIN MANAGEMENT DRUG PANEL INTERP, URINE: NORMAL
PAIN MGT DRUG PANEL, HI RES, UR: NORMAL
PCP,URINE: NEGATIVE
PHENTERMINE, UR: NOT DETECTED
PREGABALIN: NOT DETECTED
TAPENTADOL, URINE: NOT DETECTED
TAPENTADOL-O-SULFATE, URINE: NOT DETECTED
TEMAZEPAM, URINE: NOT DETECTED
TRAMADOL, URINE: NEGATIVE
ZOLPIDEM METABOLITE (ZCA), URINE: NOT DETECTED
ZOLPIDEM, URINE: NOT DETECTED

## 2024-06-17 ENCOUNTER — HOSPITAL ENCOUNTER (OUTPATIENT)
Dept: CARDIAC REHAB | Age: 74
Setting detail: THERAPIES SERIES
Discharge: HOME | End: 2024-06-17
Payer: MEDICARE

## 2024-06-17 VITALS — WEIGHT: 226 LBS | BODY MASS INDEX: 41.34 KG/M2

## 2024-06-17 PROCEDURE — 93798 PHYS/QHP OP CAR RHAB W/ECG: CPT

## 2024-06-17 ASSESSMENT — EXERCISE STRESS TEST
PEAK_METS: 2.7
PEAK_RPE: 13
PEAK_HR: 80
PEAK_BP: 106/78

## 2024-06-18 ENCOUNTER — OFFICE VISIT (OUTPATIENT)
Dept: PRIMARY CARE CLINIC | Age: 74
End: 2024-06-18
Payer: MEDICARE

## 2024-06-18 VITALS
BODY MASS INDEX: 40.97 KG/M2 | HEART RATE: 70 BPM | WEIGHT: 224 LBS | OXYGEN SATURATION: 96 % | SYSTOLIC BLOOD PRESSURE: 110 MMHG | DIASTOLIC BLOOD PRESSURE: 66 MMHG

## 2024-06-18 DIAGNOSIS — Z79.4 TYPE 2 DIABETES MELLITUS WITH DIABETIC POLYNEUROPATHY, WITH LONG-TERM CURRENT USE OF INSULIN (HCC): Primary | ICD-10-CM

## 2024-06-18 DIAGNOSIS — I25.10 CORONARY ARTERY DISEASE DUE TO LIPID RICH PLAQUE: ICD-10-CM

## 2024-06-18 DIAGNOSIS — I48.21 PERMANENT ATRIAL FIBRILLATION (HCC): ICD-10-CM

## 2024-06-18 DIAGNOSIS — E11.42 TYPE 2 DIABETES MELLITUS WITH DIABETIC POLYNEUROPATHY, WITH LONG-TERM CURRENT USE OF INSULIN (HCC): Primary | ICD-10-CM

## 2024-06-18 DIAGNOSIS — N89.8 VAGINAL ITCHING: ICD-10-CM

## 2024-06-18 DIAGNOSIS — I25.83 CORONARY ARTERY DISEASE DUE TO LIPID RICH PLAQUE: ICD-10-CM

## 2024-06-18 PROBLEM — E11.65 TYPE 2 DIABETES MELLITUS WITH HYPERGLYCEMIA (HCC): Status: ACTIVE | Noted: 2024-03-14

## 2024-06-18 PROCEDURE — 1123F ACP DISCUSS/DSCN MKR DOCD: CPT | Performed by: NURSE PRACTITIONER

## 2024-06-18 PROCEDURE — 3074F SYST BP LT 130 MM HG: CPT | Performed by: NURSE PRACTITIONER

## 2024-06-18 PROCEDURE — G8427 DOCREV CUR MEDS BY ELIG CLIN: HCPCS | Performed by: NURSE PRACTITIONER

## 2024-06-18 PROCEDURE — G8417 CALC BMI ABV UP PARAM F/U: HCPCS | Performed by: NURSE PRACTITIONER

## 2024-06-18 PROCEDURE — 3046F HEMOGLOBIN A1C LEVEL >9.0%: CPT | Performed by: NURSE PRACTITIONER

## 2024-06-18 PROCEDURE — 1090F PRES/ABSN URINE INCON ASSESS: CPT | Performed by: NURSE PRACTITIONER

## 2024-06-18 PROCEDURE — 99214 OFFICE O/P EST MOD 30 MIN: CPT | Performed by: NURSE PRACTITIONER

## 2024-06-18 PROCEDURE — G8399 PT W/DXA RESULTS DOCUMENT: HCPCS | Performed by: NURSE PRACTITIONER

## 2024-06-18 PROCEDURE — 3078F DIAST BP <80 MM HG: CPT | Performed by: NURSE PRACTITIONER

## 2024-06-18 PROCEDURE — 2022F DILAT RTA XM EVC RTNOPTHY: CPT | Performed by: NURSE PRACTITIONER

## 2024-06-18 PROCEDURE — 3017F COLORECTAL CA SCREEN DOC REV: CPT | Performed by: NURSE PRACTITIONER

## 2024-06-18 PROCEDURE — 1036F TOBACCO NON-USER: CPT | Performed by: NURSE PRACTITIONER

## 2024-06-18 RX ORDER — METFORMIN HYDROCHLORIDE 500 MG/1
500 TABLET, EXTENDED RELEASE ORAL
Qty: 90 TABLET | Refills: 1 | Status: SHIPPED | OUTPATIENT
Start: 2024-06-18

## 2024-06-18 RX ORDER — FLUCONAZOLE 150 MG/1
150 TABLET ORAL ONCE
Qty: 1 TABLET | Refills: 0 | Status: SHIPPED | OUTPATIENT
Start: 2024-06-18 | End: 2024-06-18

## 2024-06-18 RX ORDER — INSULIN GLARGINE 100 [IU]/ML
INJECTION, SOLUTION SUBCUTANEOUS
Qty: 15 ADJUSTABLE DOSE PRE-FILLED PEN SYRINGE | Refills: 1 | Status: SHIPPED | OUTPATIENT
Start: 2024-06-18

## 2024-06-18 RX ORDER — INSULIN ASPART 100 [IU]/ML
INJECTION, SOLUTION INTRAVENOUS; SUBCUTANEOUS
Qty: 5 ADJUSTABLE DOSE PRE-FILLED PEN SYRINGE | Refills: 11 | Status: SHIPPED | OUTPATIENT
Start: 2024-06-18

## 2024-06-18 NOTE — PROGRESS NOTES
[facility]  [facility] PRIMARY CARE  [facility] CARE [place] MAIN FLOOR  [facility] [address]  Dept: [phone]  Dept Fax: [phone]    Mariangel Abreu (:  1950) is a 73 y.o. female,Established patient, here for evaluation of the following chief complaint(s):  Diabetes, Congestive Heart Failure, COPD, and Follow-up (Routine well check )    Mariangel Abreu is a 73-year-old female here today for routine follow-up of multiple chronic conditions.  Last seen 2024.  Is established with cardiac rehab for CAD.      Assessment & Plan   ASSESSMENT/PLAN:  1. Type 2 diabetes mellitus with diabetic polyneuropathy, with long-term current use of insulin (HCC)  -     insulin glargine (LANTUS SOLOSTAR) 100 UNIT/ML injection pen; Inject 50 Units into the skin every morning AND 40 Units nightly., Disp-15 Adjustable Dose Pre-filled Pen Syringe, R-1Normal  -     insulin aspart (NOVOLOG FLEXPEN) 100 UNIT/ML injection pen; IF<139 NO INSULIN; 140-199-2 UN;200-249-4 UN;250-299-6 UN;300-349-8 UN;350-400=10 UN;ABOVE 400-12 UNIT(S), Disp-5 Adjustable Dose Pre-filled Pen Syringe, R-11Normal  -     metFORMIN (GLUCOPHAGE-XR) 500 MG extended release tablet; Take 1 tablet by mouth daily (with breakfast), Disp-90 tablet, R-1Normal  2. Coronary artery disease due to lipid rich plaque  3. Permanent atrial fibrillation (HCC)    Patient without any current chest pains, tolerating her medications and following with cardiology.  Sugars remain uncontrolled, increase evening basal dose of insulin to 40 units nightly.  GFR remained stable greater than 40, will restart metformin 500 mg once daily.  Unable to increase alogliptin due to creatinine clearance less than 60.  Patient not a good candidate for GLP 2 due to urinary incontinence and high risk for infection.  Patient is complaining of vaginal itching today, will send in dose of antifungal    Return in about 3 months (around 2024)

## 2024-06-19 ENCOUNTER — HOSPITAL ENCOUNTER (OUTPATIENT)
Dept: CARDIAC REHAB | Age: 74
Setting detail: THERAPIES SERIES
Discharge: HOME | End: 2024-06-19
Payer: MEDICARE

## 2024-06-19 VITALS — WEIGHT: 224 LBS | BODY MASS INDEX: 40.97 KG/M2

## 2024-06-19 PROCEDURE — 93798 PHYS/QHP OP CAR RHAB W/ECG: CPT

## 2024-06-19 ASSESSMENT — EXERCISE STRESS TEST
PEAK_HR: 76
PEAK_BP: 108/70
PEAK_METS: 2.5
PEAK_RPE: 11

## 2024-06-20 ENCOUNTER — TELEPHONE (OUTPATIENT)
Dept: PRIMARY CARE CLINIC | Age: 74
End: 2024-06-20

## 2024-06-20 NOTE — TELEPHONE ENCOUNTER
Pt called to give you the name of medication that she is not taking cause she did not know what is was. Glarzin-yfgninj 100mg and Midodrine 2.5mg. Should she take it and what is it for?

## 2024-06-20 NOTE — TELEPHONE ENCOUNTER
The first is a different name, generic name for her basal, nighttime insulin.  She reports still taking that.  Midodrine is used for people who experience low blood pressure.  Without her pill pack with her it was unclear if she was still receiving that prescription at last visit

## 2024-06-24 ENCOUNTER — HOSPITAL ENCOUNTER (OUTPATIENT)
Dept: CARDIAC REHAB | Age: 74
Setting detail: THERAPIES SERIES
Discharge: HOME | End: 2024-06-24
Payer: MEDICARE

## 2024-06-24 VITALS — BODY MASS INDEX: 41.34 KG/M2 | WEIGHT: 226 LBS

## 2024-06-24 PROCEDURE — 93798 PHYS/QHP OP CAR RHAB W/ECG: CPT

## 2024-06-24 ASSESSMENT — EXERCISE STRESS TEST
PEAK_METS: 2.7
PEAK_RPE: 11
PEAK_HR: 90
PEAK_BP: 104/66

## 2024-06-26 ENCOUNTER — HOSPITAL ENCOUNTER (OUTPATIENT)
Dept: CARDIAC REHAB | Age: 74
Setting detail: THERAPIES SERIES
Discharge: HOME | End: 2024-06-26
Payer: MEDICARE

## 2024-06-26 VITALS — BODY MASS INDEX: 41.88 KG/M2 | WEIGHT: 229 LBS

## 2024-06-26 PROCEDURE — 93798 PHYS/QHP OP CAR RHAB W/ECG: CPT

## 2024-06-26 PROCEDURE — 93797 PHYS/QHP OP CAR RHAB WO ECG: CPT

## 2024-06-26 ASSESSMENT — EXERCISE STRESS TEST
PEAK_HR: 82
PEAK_BP: 122/66
PEAK_BP: 122/66
PEAK_RPE: 13
PEAK_METS: 3

## 2024-06-26 ASSESSMENT — EJECTION FRACTION: EF_VALUE: 50

## 2024-06-26 NOTE — PROGRESS NOTES
06/26/24 0819   Treatment Diagnosis   Treatment Diagnosis 1 PCI   PCI Date 02/01/24   Treatment Diagnosis 2 STEMI   STEMI Date 02/01/24   Referral Date 02/02/24   Significant Cardiovascular History Chronic atrial fibrillation;History of heart failure  (HTN, HLD, PCI 3/11/24, LOOP RECORDER)   Co-morbidities Cerebrovascular disease;Diabetes;Pulmonary disease;Renal disease  (ANXIETY, ASTHMA, CKD-STAGE 3, COPD, DEPRESSION, EMPHYSEMA, MIGRAINES, OBESITY, HOME O2 AS NEEDED 2L/NC, ASA-CPAP AT NIGHT, CVA-MINI, [place])   Oxygen Saturation / Titration    Stages of Change  Maintenance   Oxygen Intervention   Oxygen Use Yes  (PRN)   Oxygen Type  Nasal canula   Patients Liter Flow  2L   O2 Sat Greater Than 90% Yes  (94% ON ROOM AIR)   Nurse/Patient Discussion  YES   Oxygen Education  Oxygen Safety / [place];Traveling with Oxygen;Types of Oxygen;Proper care of Oxygen Equipment;Emergency Oxygen useage   Oxygen Target Goals  Understand use of oxygen;Discontinue oxygen useage;Decrease liters required;Use oxygen as needed;Keep SA02 greater than 90%   Individual Treatment Plan   ITP Visit Type Re-assessment   1st Date of Exercise  04/25/24   ITP Next Review Date 07/24/24   Visit #/Total Visits 3/36   EF% 50 %  (50-55%)   Risk Stratification Low   ITP Exercise;Psychosocial;Tobacco;Nutrition;Education   Exercise    Stages of Change Action   Assisted Devices Walker;Wheelchair   Exercise Prescription   Mode Treadmill;Bike;Stepper;Ergometer   Frequency per week 2   Duration Per Session 43 MIN.   Intensity METS       3.0   RPE 13   Progression MINUTES INCREASED EVERY 3 DAYS, INTENSITY AS RPE ALLOWS   Resistance Training Yes   Exercise Blood Pressures   Resting /64   Peak /66   Is BP WDL Yes   Exercise Activity at Home   Type DOING EXERCISES FROM HOME PT;  ARM AND LEGS  (PT STATES SHE ALSO WALKS THE HALLS FOR 3-4 MINUTES AT A TIME)   Frequency DAILY   Duration 20-30 MINUTES   Resistance Training Yes   Exercise Education

## 2024-07-01 ENCOUNTER — HOSPITAL ENCOUNTER (OUTPATIENT)
Dept: CARDIAC REHAB | Age: 74
Setting detail: THERAPIES SERIES
Discharge: HOME | End: 2024-07-01
Payer: MEDICARE

## 2024-07-03 ENCOUNTER — HOSPITAL ENCOUNTER (OUTPATIENT)
Dept: CARDIAC REHAB | Age: 74
Setting detail: THERAPIES SERIES
Discharge: HOME | End: 2024-07-03
Payer: MEDICARE

## 2024-07-03 VITALS — BODY MASS INDEX: 41.15 KG/M2 | WEIGHT: 225 LBS

## 2024-07-03 PROCEDURE — 93798 PHYS/QHP OP CAR RHAB W/ECG: CPT

## 2024-07-03 ASSESSMENT — EXERCISE STRESS TEST
PEAK_HR: 90
PEAK_METS: 2.9
PEAK_RPE: 11
PEAK_BP: 100/62

## 2024-07-08 ENCOUNTER — APPOINTMENT (OUTPATIENT)
Dept: GENERAL RADIOLOGY | Age: 74
DRG: 638 | End: 2024-07-08
Payer: MEDICARE

## 2024-07-08 ENCOUNTER — HOSPITAL ENCOUNTER (OUTPATIENT)
Dept: CARDIAC REHAB | Age: 74
Setting detail: THERAPIES SERIES
Discharge: HOME | End: 2024-07-08
Payer: MEDICARE

## 2024-07-08 ENCOUNTER — APPOINTMENT (OUTPATIENT)
Dept: CT IMAGING | Age: 74
DRG: 638 | End: 2024-07-08
Payer: MEDICARE

## 2024-07-08 ENCOUNTER — HOSPITAL ENCOUNTER (INPATIENT)
Age: 74
LOS: 4 days | Discharge: HOME | DRG: 638 | End: 2024-07-14
Attending: EMERGENCY MEDICINE | Admitting: STUDENT IN AN ORGANIZED HEALTH CARE EDUCATION/TRAINING PROGRAM
Payer: MEDICARE

## 2024-07-08 VITALS — BODY MASS INDEX: 40.79 KG/M2 | WEIGHT: 223 LBS

## 2024-07-08 DIAGNOSIS — R07.9 CHEST PAIN, UNSPECIFIED TYPE: ICD-10-CM

## 2024-07-08 DIAGNOSIS — I20.9 ANGINA PECTORIS (HCC): ICD-10-CM

## 2024-07-08 DIAGNOSIS — M79.662 TENDERNESS OF LEFT CALF: ICD-10-CM

## 2024-07-08 DIAGNOSIS — R73.9 HYPERGLYCEMIA: Primary | ICD-10-CM

## 2024-07-08 LAB
ANION GAP SERPL CALCULATED.3IONS-SCNC: 13 MMOL/L (ref 9–16)
BASOPHILS # BLD: 0.08 K/UL (ref 0–0.2)
BASOPHILS NFR BLD: 1 % (ref 0–2)
BUN SERPL-MCNC: 22 MG/DL (ref 8–23)
CALCIUM SERPL-MCNC: 9.1 MG/DL (ref 8.6–10.4)
CHLORIDE SERPL-SCNC: 92 MMOL/L (ref 98–107)
CO2 SERPL-SCNC: 26 MMOL/L (ref 20–31)
CREAT SERPL-MCNC: 1.2 MG/DL (ref 0.5–0.9)
EOSINOPHIL # BLD: 0.16 K/UL (ref 0–0.44)
EOSINOPHILS RELATIVE PERCENT: 2 % (ref 1–4)
ERYTHROCYTE [DISTWIDTH] IN BLOOD BY AUTOMATED COUNT: 12.9 % (ref 11.8–14.4)
GFR, ESTIMATED: 46 ML/MIN/1.73M2
GLUCOSE BLD-MCNC: 591 MG/DL (ref 65–105)
GLUCOSE SERPL-MCNC: 615 MG/DL (ref 74–99)
HCT VFR BLD AUTO: 41.3 % (ref 36.3–47.1)
HGB BLD-MCNC: 13.7 G/DL (ref 11.9–15.1)
IMM GRANULOCYTES # BLD AUTO: 0.04 K/UL (ref 0–0.3)
IMM GRANULOCYTES NFR BLD: 1 %
LYMPHOCYTES NFR BLD: 2.08 K/UL (ref 1.1–3.7)
LYMPHOCYTES RELATIVE PERCENT: 26 % (ref 24–43)
MCH RBC QN AUTO: 29.8 PG (ref 25.2–33.5)
MCHC RBC AUTO-ENTMCNC: 33.2 G/DL (ref 28.4–34.8)
MCV RBC AUTO: 89.8 FL (ref 82.6–102.9)
MONOCYTES NFR BLD: 0.68 K/UL (ref 0.1–1.2)
MONOCYTES NFR BLD: 8 % (ref 3–12)
NEUTROPHILS NFR BLD: 62 % (ref 36–65)
NEUTS SEG NFR BLD: 5.11 K/UL (ref 1.5–8.1)
NRBC BLD-RTO: 0 PER 100 WBC
PLATELET # BLD AUTO: 188 K/UL (ref 138–453)
PMV BLD AUTO: 12.4 FL (ref 8.1–13.5)
POTASSIUM SERPL-SCNC: 4.3 MMOL/L (ref 3.7–5.3)
RBC # BLD AUTO: 4.6 M/UL (ref 3.95–5.11)
SODIUM SERPL-SCNC: 131 MMOL/L (ref 136–145)
TROPONIN I SERPL HS-MCNC: 13 NG/L (ref 0–14)
TROPONIN I SERPL HS-MCNC: 13 NG/L (ref 0–14)
WBC OTHER # BLD: 8.2 K/UL (ref 3.5–11.3)

## 2024-07-08 PROCEDURE — 96374 THER/PROPH/DIAG INJ IV PUSH: CPT

## 2024-07-08 PROCEDURE — 80048 BASIC METABOLIC PNL TOTAL CA: CPT

## 2024-07-08 PROCEDURE — 99285 EMERGENCY DEPT VISIT HI MDM: CPT

## 2024-07-08 PROCEDURE — 84484 ASSAY OF TROPONIN QUANT: CPT

## 2024-07-08 PROCEDURE — 82947 ASSAY GLUCOSE BLOOD QUANT: CPT

## 2024-07-08 PROCEDURE — 6370000000 HC RX 637 (ALT 250 FOR IP)

## 2024-07-08 PROCEDURE — 6360000002 HC RX W HCPCS

## 2024-07-08 PROCEDURE — 96375 TX/PRO/DX INJ NEW DRUG ADDON: CPT

## 2024-07-08 PROCEDURE — 93005 ELECTROCARDIOGRAM TRACING: CPT

## 2024-07-08 PROCEDURE — 93798 PHYS/QHP OP CAR RHAB W/ECG: CPT

## 2024-07-08 PROCEDURE — 2580000003 HC RX 258

## 2024-07-08 PROCEDURE — 71045 X-RAY EXAM CHEST 1 VIEW: CPT

## 2024-07-08 PROCEDURE — 96361 HYDRATE IV INFUSION ADD-ON: CPT

## 2024-07-08 PROCEDURE — 85025 COMPLETE CBC W/AUTO DIFF WBC: CPT

## 2024-07-08 RX ORDER — DIPHENHYDRAMINE HYDROCHLORIDE 50 MG/ML
50 INJECTION INTRAMUSCULAR; INTRAVENOUS ONCE
Status: COMPLETED | OUTPATIENT
Start: 2024-07-08 | End: 2024-07-08

## 2024-07-08 RX ORDER — INSULIN LISPRO 100 [IU]/ML
10 INJECTION, SOLUTION INTRAVENOUS; SUBCUTANEOUS ONCE
Status: COMPLETED | OUTPATIENT
Start: 2024-07-08 | End: 2024-07-08

## 2024-07-08 RX ORDER — ACETAMINOPHEN 500 MG
1000 TABLET ORAL ONCE
Status: COMPLETED | OUTPATIENT
Start: 2024-07-08 | End: 2024-07-08

## 2024-07-08 RX ORDER — 0.9 % SODIUM CHLORIDE 0.9 %
500 INTRAVENOUS SOLUTION INTRAVENOUS ONCE
Status: COMPLETED | OUTPATIENT
Start: 2024-07-08 | End: 2024-07-08

## 2024-07-08 RX ADMIN — WATER 40 MG: 1 INJECTION INTRAMUSCULAR; INTRAVENOUS; SUBCUTANEOUS at 22:39

## 2024-07-08 RX ADMIN — INSULIN LISPRO 10 UNITS: 100 INJECTION, SOLUTION INTRAVENOUS; SUBCUTANEOUS at 22:37

## 2024-07-08 RX ADMIN — SODIUM CHLORIDE 500 ML: 9 INJECTION, SOLUTION INTRAVENOUS at 22:46

## 2024-07-08 RX ADMIN — ACETAMINOPHEN 1000 MG: 500 TABLET ORAL at 21:45

## 2024-07-08 RX ADMIN — DIPHENHYDRAMINE HYDROCHLORIDE 50 MG: 50 INJECTION INTRAMUSCULAR; INTRAVENOUS at 22:37

## 2024-07-08 ASSESSMENT — PAIN SCALES - GENERAL: PAINLEVEL_OUTOF10: 8

## 2024-07-08 ASSESSMENT — EXERCISE STRESS TEST
PEAK_HR: 102
PEAK_METS: 2.9
PEAK_BP: 136/70
PEAK_RPE: 12

## 2024-07-08 ASSESSMENT — PAIN DESCRIPTION - PAIN TYPE: TYPE: ACUTE PAIN

## 2024-07-08 ASSESSMENT — PAIN DESCRIPTION - ORIENTATION: ORIENTATION: LEFT

## 2024-07-08 ASSESSMENT — PAIN DESCRIPTION - DESCRIPTORS: DESCRIPTORS: ACHING

## 2024-07-08 ASSESSMENT — PAIN DESCRIPTION - LOCATION: LOCATION: CHEST

## 2024-07-09 ENCOUNTER — APPOINTMENT (OUTPATIENT)
Dept: CT IMAGING | Age: 74
DRG: 638 | End: 2024-07-09
Payer: MEDICARE

## 2024-07-09 ENCOUNTER — APPOINTMENT (OUTPATIENT)
Dept: VASCULAR LAB | Age: 74
DRG: 638 | End: 2024-07-09
Payer: MEDICARE

## 2024-07-09 ENCOUNTER — HOSPITAL ENCOUNTER (OUTPATIENT)
Age: 74
Discharge: HOME | End: 2024-07-09

## 2024-07-09 ENCOUNTER — CARE COORDINATION (OUTPATIENT)
Dept: CARE COORDINATION | Age: 74
End: 2024-07-09

## 2024-07-09 ENCOUNTER — APPOINTMENT (OUTPATIENT)
Dept: NUCLEAR MEDICINE | Age: 74
DRG: 638 | End: 2024-07-09
Payer: MEDICARE

## 2024-07-09 PROBLEM — I25.10 CAD S/P PERCUTANEOUS CORONARY ANGIOPLASTY: Status: ACTIVE | Noted: 2024-07-09

## 2024-07-09 PROBLEM — R73.9 HYPERGLYCEMIA: Status: ACTIVE | Noted: 2024-07-09

## 2024-07-09 PROBLEM — Z98.61 CAD S/P PERCUTANEOUS CORONARY ANGIOPLASTY: Status: ACTIVE | Noted: 2024-07-09

## 2024-07-09 LAB
ANION GAP SERPL CALCULATED.3IONS-SCNC: 12 MMOL/L (ref 9–16)
ANION GAP SERPL CALCULATED.3IONS-SCNC: 14 MMOL/L (ref 9–16)
ANION GAP SERPL CALCULATED.3IONS-SCNC: 17 MMOL/L (ref 9–16)
BILIRUB UR QL STRIP: NEGATIVE
BUN SERPL-MCNC: 24 MG/DL (ref 8–23)
BUN SERPL-MCNC: 27 MG/DL (ref 8–23)
BUN SERPL-MCNC: 27 MG/DL (ref 8–23)
CALCIUM SERPL-MCNC: 8.2 MG/DL (ref 8.6–10.4)
CALCIUM SERPL-MCNC: 8.3 MG/DL (ref 8.6–10.4)
CALCIUM SERPL-MCNC: 8.8 MG/DL (ref 8.6–10.4)
CHLORIDE SERPL-SCNC: 100 MMOL/L (ref 98–107)
CHLORIDE SERPL-SCNC: 103 MMOL/L (ref 98–107)
CHLORIDE SERPL-SCNC: 98 MMOL/L (ref 98–107)
CLARITY UR: CLEAR
CO2 SERPL-SCNC: 22 MMOL/L (ref 20–31)
CO2 SERPL-SCNC: 22 MMOL/L (ref 20–31)
CO2 SERPL-SCNC: 24 MMOL/L (ref 20–31)
COLOR UR: YELLOW
COMMENT: ABNORMAL
CREAT SERPL-MCNC: 1 MG/DL (ref 0.5–0.9)
CREAT SERPL-MCNC: 1.1 MG/DL (ref 0.5–0.9)
CREAT SERPL-MCNC: 1.1 MG/DL (ref 0.5–0.9)
ECHO BSA: 2.1 M2
EST. AVERAGE GLUCOSE BLD GHB EST-MCNC: 361 MG/DL
GFR, ESTIMATED: 51 ML/MIN/1.73M2
GFR, ESTIMATED: 53 ML/MIN/1.73M2
GFR, ESTIMATED: 57 ML/MIN/1.73M2
GLUCOSE BLD-MCNC: 194 MG/DL (ref 65–105)
GLUCOSE BLD-MCNC: 199 MG/DL (ref 65–105)
GLUCOSE BLD-MCNC: 205 MG/DL (ref 65–105)
GLUCOSE BLD-MCNC: 222 MG/DL (ref 65–105)
GLUCOSE BLD-MCNC: 273 MG/DL (ref 65–105)
GLUCOSE BLD-MCNC: 304 MG/DL (ref 65–105)
GLUCOSE BLD-MCNC: 356 MG/DL (ref 65–105)
GLUCOSE BLD-MCNC: 370 MG/DL (ref 65–105)
GLUCOSE BLD-MCNC: 377 MG/DL (ref 65–105)
GLUCOSE BLD-MCNC: 383 MG/DL (ref 65–105)
GLUCOSE BLD-MCNC: 433 MG/DL (ref 65–105)
GLUCOSE BLD-MCNC: 438 MG/DL (ref 65–105)
GLUCOSE BLD-MCNC: 438 MG/DL (ref 65–105)
GLUCOSE BLD-MCNC: 508 MG/DL (ref 65–105)
GLUCOSE BLD-MCNC: 535 MG/DL (ref 65–105)
GLUCOSE SERPL-MCNC: 240 MG/DL (ref 74–99)
GLUCOSE SERPL-MCNC: 370 MG/DL (ref 74–99)
GLUCOSE SERPL-MCNC: 500 MG/DL (ref 74–99)
GLUCOSE UR STRIP-MCNC: ABNORMAL MG/DL
HBA1C MFR BLD: 14.2 % (ref 4–6)
HGB UR QL STRIP.AUTO: NEGATIVE
KETONES UR STRIP-MCNC: NEGATIVE MG/DL
LEUKOCYTE ESTERASE UR QL STRIP: NEGATIVE
MAGNESIUM SERPL-MCNC: 1.5 MG/DL (ref 1.6–2.4)
MAGNESIUM SERPL-MCNC: 2 MG/DL (ref 1.6–2.4)
NITRITE UR QL STRIP: NEGATIVE
PH UR STRIP: 5.5 [PH] (ref 5–8)
PHOSPHATE SERPL-MCNC: 2.3 MG/DL (ref 2.5–4.5)
PHOSPHATE SERPL-MCNC: 2.5 MG/DL (ref 2.5–4.5)
PHOSPHATE SERPL-MCNC: 3.6 MG/DL (ref 2.5–4.5)
POTASSIUM SERPL-SCNC: 4 MMOL/L (ref 3.7–5.3)
POTASSIUM SERPL-SCNC: 4.1 MMOL/L (ref 3.7–5.3)
POTASSIUM SERPL-SCNC: 4.2 MMOL/L (ref 3.7–5.3)
PROT UR STRIP-MCNC: NEGATIVE MG/DL
SODIUM SERPL-SCNC: 136 MMOL/L (ref 136–145)
SODIUM SERPL-SCNC: 137 MMOL/L (ref 136–145)
SODIUM SERPL-SCNC: 139 MMOL/L (ref 136–145)
SP GR UR STRIP: 1.05 (ref 1–1.03)
TROPONIN I SERPL HS-MCNC: 8 NG/L (ref 0–14)
UROBILINOGEN UR STRIP-ACNC: NORMAL EU/DL (ref 0–1)

## 2024-07-09 PROCEDURE — 6360000002 HC RX W HCPCS

## 2024-07-09 PROCEDURE — 6360000004 HC RX CONTRAST MEDICATION

## 2024-07-09 PROCEDURE — 2580000003 HC RX 258: Performed by: STUDENT IN AN ORGANIZED HEALTH CARE EDUCATION/TRAINING PROGRAM

## 2024-07-09 PROCEDURE — 83036 HEMOGLOBIN GLYCOSYLATED A1C: CPT

## 2024-07-09 PROCEDURE — 82947 ASSAY GLUCOSE BLOOD QUANT: CPT

## 2024-07-09 PROCEDURE — 96372 THER/PROPH/DIAG INJ SC/IM: CPT

## 2024-07-09 PROCEDURE — 94640 AIRWAY INHALATION TREATMENT: CPT

## 2024-07-09 PROCEDURE — 96376 TX/PRO/DX INJ SAME DRUG ADON: CPT

## 2024-07-09 PROCEDURE — 96367 TX/PROPH/DG ADDL SEQ IV INF: CPT

## 2024-07-09 PROCEDURE — 80048 BASIC METABOLIC PNL TOTAL CA: CPT

## 2024-07-09 PROCEDURE — G0108 DIAB MANAGE TRN  PER INDIV: HCPCS

## 2024-07-09 PROCEDURE — 84100 ASSAY OF PHOSPHORUS: CPT

## 2024-07-09 PROCEDURE — 84484 ASSAY OF TROPONIN QUANT: CPT

## 2024-07-09 PROCEDURE — 2580000003 HC RX 258: Performed by: NURSE PRACTITIONER

## 2024-07-09 PROCEDURE — G0378 HOSPITAL OBSERVATION PER HR: HCPCS

## 2024-07-09 PROCEDURE — 81003 URINALYSIS AUTO W/O SCOPE: CPT

## 2024-07-09 PROCEDURE — 71260 CT THORAX DX C+: CPT

## 2024-07-09 PROCEDURE — A9500 TC99M SESTAMIBI: HCPCS | Performed by: STUDENT IN AN ORGANIZED HEALTH CARE EDUCATION/TRAINING PROGRAM

## 2024-07-09 PROCEDURE — 6370000000 HC RX 637 (ALT 250 FOR IP)

## 2024-07-09 PROCEDURE — 6360000002 HC RX W HCPCS: Performed by: NURSE PRACTITIONER

## 2024-07-09 PROCEDURE — 96366 THER/PROPH/DIAG IV INF ADDON: CPT

## 2024-07-09 PROCEDURE — 93971 EXTREMITY STUDY: CPT

## 2024-07-09 PROCEDURE — 94761 N-INVAS EAR/PLS OXIMETRY MLT: CPT

## 2024-07-09 PROCEDURE — 3430000000 HC RX DIAGNOSTIC RADIOPHARMACEUTICAL: Performed by: STUDENT IN AN ORGANIZED HEALTH CARE EDUCATION/TRAINING PROGRAM

## 2024-07-09 PROCEDURE — 6370000000 HC RX 637 (ALT 250 FOR IP): Performed by: NURSE PRACTITIONER

## 2024-07-09 PROCEDURE — 93971 EXTREMITY STUDY: CPT | Performed by: SURGERY

## 2024-07-09 PROCEDURE — 83735 ASSAY OF MAGNESIUM: CPT

## 2024-07-09 PROCEDURE — 2700000000 HC OXYGEN THERAPY PER DAY

## 2024-07-09 PROCEDURE — 96361 HYDRATE IV INFUSION ADD-ON: CPT

## 2024-07-09 PROCEDURE — 96365 THER/PROPH/DIAG IV INF INIT: CPT

## 2024-07-09 PROCEDURE — 6370000000 HC RX 637 (ALT 250 FOR IP): Performed by: STUDENT IN AN ORGANIZED HEALTH CARE EDUCATION/TRAINING PROGRAM

## 2024-07-09 PROCEDURE — 96368 THER/DIAG CONCURRENT INF: CPT

## 2024-07-09 PROCEDURE — 2580000003 HC RX 258

## 2024-07-09 PROCEDURE — 99222 1ST HOSP IP/OBS MODERATE 55: CPT | Performed by: STUDENT IN AN ORGANIZED HEALTH CARE EDUCATION/TRAINING PROGRAM

## 2024-07-09 PROCEDURE — 99223 1ST HOSP IP/OBS HIGH 75: CPT | Performed by: INTERNAL MEDICINE

## 2024-07-09 RX ORDER — POLYETHYLENE GLYCOL 3350 17 G/17G
17 POWDER, FOR SOLUTION ORAL DAILY PRN
Status: DISCONTINUED | OUTPATIENT
Start: 2024-07-09 | End: 2024-07-14 | Stop reason: HOSPADM

## 2024-07-09 RX ORDER — NITROGLYCERIN 0.4 MG/1
0.4 TABLET SUBLINGUAL EVERY 5 MIN PRN
Status: CANCELLED | OUTPATIENT
Start: 2024-07-09 | End: 2024-07-09

## 2024-07-09 RX ORDER — DEXTROSE MONOHYDRATE AND SODIUM CHLORIDE 5; .45 G/100ML; G/100ML
INJECTION, SOLUTION INTRAVENOUS CONTINUOUS PRN
Status: DISCONTINUED | OUTPATIENT
Start: 2024-07-09 | End: 2024-07-09 | Stop reason: SDUPTHER

## 2024-07-09 RX ORDER — 0.9 % SODIUM CHLORIDE 0.9 %
500 INTRAVENOUS SOLUTION INTRAVENOUS ONCE
Status: COMPLETED | OUTPATIENT
Start: 2024-07-09 | End: 2024-07-09

## 2024-07-09 RX ORDER — METOPROLOL TARTRATE 1 MG/ML
5 INJECTION, SOLUTION INTRAVENOUS EVERY 5 MIN PRN
Status: CANCELLED | OUTPATIENT
Start: 2024-07-09 | End: 2024-07-09

## 2024-07-09 RX ORDER — INSULIN LISPRO 100 [IU]/ML
0-4 INJECTION, SOLUTION INTRAVENOUS; SUBCUTANEOUS NIGHTLY
Status: DISCONTINUED | OUTPATIENT
Start: 2024-07-09 | End: 2024-07-09

## 2024-07-09 RX ORDER — INSULIN LISPRO 100 [IU]/ML
10 INJECTION, SOLUTION INTRAVENOUS; SUBCUTANEOUS ONCE
Status: COMPLETED | OUTPATIENT
Start: 2024-07-09 | End: 2024-07-09

## 2024-07-09 RX ORDER — POTASSIUM CHLORIDE 7.45 MG/ML
10 INJECTION INTRAVENOUS PRN
Status: DISCONTINUED | OUTPATIENT
Start: 2024-07-09 | End: 2024-07-09 | Stop reason: SDUPTHER

## 2024-07-09 RX ORDER — ONDANSETRON 4 MG/1
4 TABLET, ORALLY DISINTEGRATING ORAL EVERY 8 HOURS PRN
Status: DISCONTINUED | OUTPATIENT
Start: 2024-07-09 | End: 2024-07-14 | Stop reason: HOSPADM

## 2024-07-09 RX ORDER — ONDANSETRON 2 MG/ML
4 INJECTION INTRAMUSCULAR; INTRAVENOUS EVERY 6 HOURS PRN
Status: DISCONTINUED | OUTPATIENT
Start: 2024-07-09 | End: 2024-07-14 | Stop reason: HOSPADM

## 2024-07-09 RX ORDER — MIDODRINE HYDROCHLORIDE 2.5 MG/1
2.5 TABLET ORAL
Status: DISCONTINUED | OUTPATIENT
Start: 2024-07-09 | End: 2024-07-14 | Stop reason: HOSPADM

## 2024-07-09 RX ORDER — OXYBUTYNIN CHLORIDE 5 MG/1
5 TABLET, EXTENDED RELEASE ORAL NIGHTLY
Status: DISCONTINUED | OUTPATIENT
Start: 2024-07-09 | End: 2024-07-14 | Stop reason: HOSPADM

## 2024-07-09 RX ORDER — IOPAMIDOL 755 MG/ML
75 INJECTION, SOLUTION INTRAVASCULAR
Status: COMPLETED | OUTPATIENT
Start: 2024-07-09 | End: 2024-07-09

## 2024-07-09 RX ORDER — SODIUM CHLORIDE 9 MG/ML
500 INJECTION, SOLUTION INTRAVENOUS CONTINUOUS PRN
Status: CANCELLED | OUTPATIENT
Start: 2024-07-09 | End: 2024-07-09

## 2024-07-09 RX ORDER — ROSUVASTATIN CALCIUM 20 MG/1
40 TABLET, COATED ORAL NIGHTLY
Status: DISCONTINUED | OUTPATIENT
Start: 2024-07-09 | End: 2024-07-14 | Stop reason: HOSPADM

## 2024-07-09 RX ORDER — ALBUTEROL SULFATE 90 UG/1
2 INHALANT RESPIRATORY (INHALATION) 4 TIMES DAILY PRN
Status: DISCONTINUED | OUTPATIENT
Start: 2024-07-09 | End: 2024-07-14 | Stop reason: HOSPADM

## 2024-07-09 RX ORDER — DEXTROSE MONOHYDRATE AND SODIUM CHLORIDE 5; .45 G/100ML; G/100ML
INJECTION, SOLUTION INTRAVENOUS CONTINUOUS PRN
Status: DISCONTINUED | OUTPATIENT
Start: 2024-07-09 | End: 2024-07-14 | Stop reason: HOSPADM

## 2024-07-09 RX ORDER — SODIUM CHLORIDE 0.9 % (FLUSH) 0.9 %
10 SYRINGE (ML) INJECTION PRN
Status: DISCONTINUED | OUTPATIENT
Start: 2024-07-09 | End: 2024-07-14 | Stop reason: HOSPADM

## 2024-07-09 RX ORDER — 0.9 % SODIUM CHLORIDE 0.9 %
250 INTRAVENOUS SOLUTION INTRAVENOUS ONCE
Status: COMPLETED | OUTPATIENT
Start: 2024-07-09 | End: 2024-07-09

## 2024-07-09 RX ORDER — IPRATROPIUM BROMIDE AND ALBUTEROL SULFATE 2.5; .5 MG/3ML; MG/3ML
1 SOLUTION RESPIRATORY (INHALATION) EVERY 4 HOURS PRN
Status: DISCONTINUED | OUTPATIENT
Start: 2024-07-09 | End: 2024-07-09 | Stop reason: SDUPTHER

## 2024-07-09 RX ORDER — IPRATROPIUM BROMIDE AND ALBUTEROL SULFATE 2.5; .5 MG/3ML; MG/3ML
1 SOLUTION RESPIRATORY (INHALATION) EVERY 4 HOURS PRN
Status: DISCONTINUED | OUTPATIENT
Start: 2024-07-09 | End: 2024-07-14 | Stop reason: HOSPADM

## 2024-07-09 RX ORDER — IPRATROPIUM BROMIDE AND ALBUTEROL SULFATE 2.5; .5 MG/3ML; MG/3ML
1 SOLUTION RESPIRATORY (INHALATION)
Status: DISCONTINUED | OUTPATIENT
Start: 2024-07-09 | End: 2024-07-09

## 2024-07-09 RX ORDER — MAGNESIUM SULFATE 1 G/100ML
1000 INJECTION INTRAVENOUS PRN
Status: DISCONTINUED | OUTPATIENT
Start: 2024-07-09 | End: 2024-07-09 | Stop reason: SDUPTHER

## 2024-07-09 RX ORDER — OXYCODONE HYDROCHLORIDE 5 MG/1
5 TABLET ORAL EVERY 4 HOURS PRN
Status: DISCONTINUED | OUTPATIENT
Start: 2024-07-09 | End: 2024-07-09

## 2024-07-09 RX ORDER — PANTOPRAZOLE SODIUM 40 MG/1
40 TABLET, DELAYED RELEASE ORAL
Status: DISCONTINUED | OUTPATIENT
Start: 2024-07-09 | End: 2024-07-14 | Stop reason: HOSPADM

## 2024-07-09 RX ORDER — REGADENOSON 0.08 MG/ML
0.4 INJECTION, SOLUTION INTRAVENOUS
Status: CANCELLED | OUTPATIENT
Start: 2024-07-09

## 2024-07-09 RX ORDER — POTASSIUM CHLORIDE 7.45 MG/ML
10 INJECTION INTRAVENOUS PRN
Status: DISCONTINUED | OUTPATIENT
Start: 2024-07-09 | End: 2024-07-14 | Stop reason: HOSPADM

## 2024-07-09 RX ORDER — 0.9 % SODIUM CHLORIDE 0.9 %
15 INTRAVENOUS SOLUTION INTRAVENOUS ONCE
Status: DISCONTINUED | OUTPATIENT
Start: 2024-07-09 | End: 2024-07-14 | Stop reason: HOSPADM

## 2024-07-09 RX ORDER — SODIUM CHLORIDE 0.9 % (FLUSH) 0.9 %
5-40 SYRINGE (ML) INJECTION EVERY 12 HOURS SCHEDULED
Status: DISCONTINUED | OUTPATIENT
Start: 2024-07-09 | End: 2024-07-14 | Stop reason: HOSPADM

## 2024-07-09 RX ORDER — ESCITALOPRAM OXALATE 10 MG/1
20 TABLET ORAL DAILY
Status: DISCONTINUED | OUTPATIENT
Start: 2024-07-09 | End: 2024-07-14 | Stop reason: HOSPADM

## 2024-07-09 RX ORDER — INSULIN GLARGINE 100 [IU]/ML
50 INJECTION, SOLUTION SUBCUTANEOUS EVERY MORNING
Status: DISCONTINUED | OUTPATIENT
Start: 2024-07-09 | End: 2024-07-09

## 2024-07-09 RX ORDER — TOPIRAMATE 100 MG/1
100 TABLET, FILM COATED ORAL DAILY
Status: DISCONTINUED | OUTPATIENT
Start: 2024-07-09 | End: 2024-07-14 | Stop reason: HOSPADM

## 2024-07-09 RX ORDER — ASPIRIN 81 MG/1
81 TABLET ORAL DAILY
Status: DISCONTINUED | OUTPATIENT
Start: 2024-07-09 | End: 2024-07-14 | Stop reason: HOSPADM

## 2024-07-09 RX ORDER — 0.9 % SODIUM CHLORIDE 0.9 %
15 INTRAVENOUS SOLUTION INTRAVENOUS ONCE
Status: COMPLETED | OUTPATIENT
Start: 2024-07-09 | End: 2024-07-09

## 2024-07-09 RX ORDER — ENOXAPARIN SODIUM 100 MG/ML
30 INJECTION SUBCUTANEOUS 2 TIMES DAILY
Status: DISCONTINUED | OUTPATIENT
Start: 2024-07-09 | End: 2024-07-14 | Stop reason: HOSPADM

## 2024-07-09 RX ORDER — SODIUM CHLORIDE 9 MG/ML
INJECTION, SOLUTION INTRAVENOUS CONTINUOUS
Status: DISCONTINUED | OUTPATIENT
Start: 2024-07-09 | End: 2024-07-14 | Stop reason: HOSPADM

## 2024-07-09 RX ORDER — INSULIN GLARGINE 100 [IU]/ML
40 INJECTION, SOLUTION SUBCUTANEOUS NIGHTLY
Status: DISCONTINUED | OUTPATIENT
Start: 2024-07-09 | End: 2024-07-09

## 2024-07-09 RX ORDER — INSULIN LISPRO 100 [IU]/ML
0-16 INJECTION, SOLUTION INTRAVENOUS; SUBCUTANEOUS
Status: DISCONTINUED | OUTPATIENT
Start: 2024-07-09 | End: 2024-07-09

## 2024-07-09 RX ORDER — ALBUTEROL SULFATE 90 UG/1
2 INHALANT RESPIRATORY (INHALATION) PRN
Status: CANCELLED | OUTPATIENT
Start: 2024-07-09 | End: 2024-07-09

## 2024-07-09 RX ORDER — METFORMIN HYDROCHLORIDE 500 MG/1
500 TABLET, EXTENDED RELEASE ORAL
Status: DISCONTINUED | OUTPATIENT
Start: 2024-07-09 | End: 2024-07-14 | Stop reason: HOSPADM

## 2024-07-09 RX ORDER — METOPROLOL TARTRATE 25 MG/1
25 TABLET, FILM COATED ORAL 2 TIMES DAILY
Status: DISCONTINUED | OUTPATIENT
Start: 2024-07-09 | End: 2024-07-14 | Stop reason: HOSPADM

## 2024-07-09 RX ORDER — ISOSORBIDE DINITRATE 10 MG/1
30 TABLET ORAL DAILY
Status: DISCONTINUED | OUTPATIENT
Start: 2024-07-09 | End: 2024-07-11

## 2024-07-09 RX ORDER — ACETAMINOPHEN 650 MG/1
650 SUPPOSITORY RECTAL EVERY 6 HOURS PRN
Status: DISCONTINUED | OUTPATIENT
Start: 2024-07-09 | End: 2024-07-14 | Stop reason: HOSPADM

## 2024-07-09 RX ORDER — AMINOPHYLLINE 25 MG/ML
50 INJECTION, SOLUTION INTRAVENOUS PRN
Status: CANCELLED | OUTPATIENT
Start: 2024-07-09 | End: 2024-07-09

## 2024-07-09 RX ORDER — OXYCODONE HYDROCHLORIDE 5 MG/1
5 TABLET ORAL EVERY 8 HOURS PRN
Status: DISCONTINUED | OUTPATIENT
Start: 2024-07-09 | End: 2024-07-14 | Stop reason: HOSPADM

## 2024-07-09 RX ORDER — DEXTROSE MONOHYDRATE 100 MG/ML
INJECTION, SOLUTION INTRAVENOUS CONTINUOUS PRN
Status: DISCONTINUED | OUTPATIENT
Start: 2024-07-09 | End: 2024-07-14 | Stop reason: HOSPADM

## 2024-07-09 RX ORDER — GLUCAGON 1 MG/ML
1 KIT INJECTION PRN
Status: DISCONTINUED | OUTPATIENT
Start: 2024-07-09 | End: 2024-07-14 | Stop reason: HOSPADM

## 2024-07-09 RX ORDER — BUDESONIDE AND FORMOTEROL FUMARATE DIHYDRATE 80; 4.5 UG/1; UG/1
2 AEROSOL RESPIRATORY (INHALATION)
Status: DISCONTINUED | OUTPATIENT
Start: 2024-07-09 | End: 2024-07-14 | Stop reason: HOSPADM

## 2024-07-09 RX ORDER — ATROPINE SULFATE 0.1 MG/ML
0.5 INJECTION INTRAVENOUS EVERY 5 MIN PRN
Status: CANCELLED | OUTPATIENT
Start: 2024-07-09 | End: 2024-07-09

## 2024-07-09 RX ORDER — POTASSIUM CHLORIDE 1500 MG/1
40 TABLET, EXTENDED RELEASE ORAL PRN
Status: DISCONTINUED | OUTPATIENT
Start: 2024-07-09 | End: 2024-07-09 | Stop reason: SDUPTHER

## 2024-07-09 RX ORDER — MAGNESIUM SULFATE IN WATER 40 MG/ML
2000 INJECTION, SOLUTION INTRAVENOUS PRN
Status: DISCONTINUED | OUTPATIENT
Start: 2024-07-09 | End: 2024-07-14 | Stop reason: HOSPADM

## 2024-07-09 RX ORDER — SODIUM CHLORIDE 0.9 % (FLUSH) 0.9 %
5-40 SYRINGE (ML) INJECTION PRN
Status: CANCELLED | OUTPATIENT
Start: 2024-07-09 | End: 2024-07-09

## 2024-07-09 RX ORDER — ACETAMINOPHEN 325 MG/1
650 TABLET ORAL EVERY 6 HOURS PRN
Status: DISCONTINUED | OUTPATIENT
Start: 2024-07-09 | End: 2024-07-14 | Stop reason: HOSPADM

## 2024-07-09 RX ORDER — SODIUM CHLORIDE 9 MG/ML
INJECTION, SOLUTION INTRAVENOUS PRN
Status: DISCONTINUED | OUTPATIENT
Start: 2024-07-09 | End: 2024-07-14 | Stop reason: HOSPADM

## 2024-07-09 RX ORDER — INSULIN LISPRO 100 [IU]/ML
10 INJECTION, SOLUTION INTRAVENOUS; SUBCUTANEOUS
Status: DISCONTINUED | OUTPATIENT
Start: 2024-07-09 | End: 2024-07-09

## 2024-07-09 RX ADMIN — IOPAMIDOL 75 ML: 755 INJECTION, SOLUTION INTRAVENOUS at 02:51

## 2024-07-09 RX ADMIN — SODIUM CHLORIDE, PRESERVATIVE FREE 10 ML: 5 INJECTION INTRAVENOUS at 08:28

## 2024-07-09 RX ADMIN — WATER 40 MG: 1 INJECTION INTRAMUSCULAR; INTRAVENOUS; SUBCUTANEOUS at 02:43

## 2024-07-09 RX ADMIN — OXYCODONE 5 MG: 5 TABLET ORAL at 03:15

## 2024-07-09 RX ADMIN — SODIUM CHLORIDE, PRESERVATIVE FREE 10 ML: 5 INJECTION INTRAVENOUS at 21:17

## 2024-07-09 RX ADMIN — TICAGRELOR 90 MG: 90 TABLET ORAL at 21:16

## 2024-07-09 RX ADMIN — ASPIRIN 81 MG: 81 TABLET, COATED ORAL at 08:25

## 2024-07-09 RX ADMIN — TOPIRAMATE 100 MG: 100 TABLET, FILM COATED ORAL at 08:26

## 2024-07-09 RX ADMIN — OXYBUTYNIN CHLORIDE 5 MG: 5 TABLET, EXTENDED RELEASE ORAL at 21:16

## 2024-07-09 RX ADMIN — ROSUVASTATIN CALCIUM 40 MG: 20 TABLET, FILM COATED ORAL at 21:16

## 2024-07-09 RX ADMIN — BUDESONIDE AND FORMOTEROL FUMARATE DIHYDRATE 2 PUFF: 80; 4.5 AEROSOL RESPIRATORY (INHALATION) at 19:31

## 2024-07-09 RX ADMIN — PANTOPRAZOLE SODIUM 40 MG: 40 TABLET, DELAYED RELEASE ORAL at 16:52

## 2024-07-09 RX ADMIN — SODIUM CHLORIDE 250 ML: 9 INJECTION, SOLUTION INTRAVENOUS at 10:57

## 2024-07-09 RX ADMIN — SODIUM CHLORIDE 9 UNITS/HR: 9 INJECTION, SOLUTION INTRAVENOUS at 15:42

## 2024-07-09 RX ADMIN — PANTOPRAZOLE SODIUM 40 MG: 40 TABLET, DELAYED RELEASE ORAL at 08:25

## 2024-07-09 RX ADMIN — MAGNESIUM SULFATE HEPTAHYDRATE 1000 MG: 1 INJECTION, SOLUTION INTRAVENOUS at 13:58

## 2024-07-09 RX ADMIN — ISOSORBIDE DINITRATE 30 MG: 10 TABLET ORAL at 08:26

## 2024-07-09 RX ADMIN — BUDESONIDE AND FORMOTEROL FUMARATE DIHYDRATE 2 PUFF: 80; 4.5 AEROSOL RESPIRATORY (INHALATION) at 07:46

## 2024-07-09 RX ADMIN — WATER 40 MG: 1 INJECTION INTRAMUSCULAR; INTRAVENOUS; SUBCUTANEOUS at 06:46

## 2024-07-09 RX ADMIN — OXYCODONE 5 MG: 5 TABLET ORAL at 12:10

## 2024-07-09 RX ADMIN — METOPROLOL TARTRATE 25 MG: 50 TABLET, FILM COATED ORAL at 08:25

## 2024-07-09 RX ADMIN — SODIUM CHLORIDE 500 ML: 9 INJECTION, SOLUTION INTRAVENOUS at 00:23

## 2024-07-09 RX ADMIN — MAGNESIUM SULFATE HEPTAHYDRATE 1000 MG: 1 INJECTION, SOLUTION INTRAVENOUS at 15:01

## 2024-07-09 RX ADMIN — TICAGRELOR 90 MG: 90 TABLET ORAL at 08:26

## 2024-07-09 RX ADMIN — DEXTROSE AND SODIUM CHLORIDE: 5; 450 INJECTION, SOLUTION INTRAVENOUS at 23:11

## 2024-07-09 RX ADMIN — INSULIN LISPRO 16 UNITS: 100 INJECTION, SOLUTION INTRAVENOUS; SUBCUTANEOUS at 12:09

## 2024-07-09 RX ADMIN — SODIUM CHLORIDE, PRESERVATIVE FREE 10 ML: 5 INJECTION INTRAVENOUS at 12:19

## 2024-07-09 RX ADMIN — INSULIN LISPRO 16 UNITS: 100 INJECTION, SOLUTION INTRAVENOUS; SUBCUTANEOUS at 08:27

## 2024-07-09 RX ADMIN — INSULIN LISPRO 10 UNITS: 100 INJECTION, SOLUTION INTRAVENOUS; SUBCUTANEOUS at 00:20

## 2024-07-09 RX ADMIN — ENOXAPARIN SODIUM 30 MG: 100 INJECTION SUBCUTANEOUS at 08:28

## 2024-07-09 RX ADMIN — INSULIN GLARGINE 50 UNITS: 100 INJECTION, SOLUTION SUBCUTANEOUS at 08:27

## 2024-07-09 RX ADMIN — METOPROLOL TARTRATE 25 MG: 50 TABLET, FILM COATED ORAL at 21:16

## 2024-07-09 RX ADMIN — SODIUM CHLORIDE 1000 ML: 9 INJECTION, SOLUTION INTRAVENOUS at 15:34

## 2024-07-09 RX ADMIN — Medication 5 MG: at 21:16

## 2024-07-09 RX ADMIN — INSULIN LISPRO 10 UNITS: 100 INJECTION, SOLUTION INTRAVENOUS; SUBCUTANEOUS at 12:10

## 2024-07-09 RX ADMIN — TETRAKIS(2-METHOXYISOBUTYLISOCYANIDE)COPPER(I) TETRAFLUOROBORATE 38.5 MILLICURIE: 1 INJECTION, POWDER, LYOPHILIZED, FOR SOLUTION INTRAVENOUS at 12:19

## 2024-07-09 RX ADMIN — ESCITALOPRAM OXALATE 20 MG: 10 TABLET ORAL at 08:26

## 2024-07-09 ASSESSMENT — PAIN DESCRIPTION - DESCRIPTORS
DESCRIPTORS: DULL;ACHING
DESCRIPTORS: ACHING
DESCRIPTORS: SHARP
DESCRIPTORS: SHARP

## 2024-07-09 ASSESSMENT — PAIN SCALES - GENERAL
PAINLEVEL_OUTOF10: 8
PAINLEVEL_OUTOF10: 7
PAINLEVEL_OUTOF10: 5
PAINLEVEL_OUTOF10: 8

## 2024-07-09 ASSESSMENT — PAIN DESCRIPTION - LOCATION
LOCATION: CHEST

## 2024-07-09 ASSESSMENT — HEART SCORE: ECG: NORMAL

## 2024-07-09 ASSESSMENT — PAIN DESCRIPTION - ORIENTATION: ORIENTATION: LEFT

## 2024-07-09 NOTE — H&P
[facility]  Office: [phone]  Goldy Moore DO, Ferdinand Villeda DO, Jose Goff DO, Alejo Mao DO, Mainor Smith MD, Melony Rodriguez MD, Tonia Brenner MD, Alethea Ramsey MD,  Rahul Zheng MD, Josi Saldana MD, Pedro Deras MD,  Lauren Acosta DO, Randee Pantoja MD, Raji Reddy MD, Praveen Moore DO, Veronica Duff MD,  Spencer Ferguson DO, Olivia Boss MD, Veronika Lin MD, Caryl Zacarias MD, Slick Jean MD,  Tacos Yang MD, Guzman Stein MD, Vicente Yee MD, Mickey Avila MD, Conrad Bailey MD, Raisa Felder MD, Travon Blanchard DO, Joel Rome DO, Mariel Francisco MD,  Mika Valentin MD, Shirley Waterhouse, CNP,  Isamar Seals CNP, Jose Warren, CNP,  Jenna Angulo, DNP, Jeannette Bobo, CNP, Sofya Rose, CNP, Lara Yan, CNP, Michelle García, CNP, Tamara Issa, PA-C, Halina Lynch PA-C, Greer Harmon, CNP, Tomasa Trimble, CNP, Anika Medina, CNP, Keri Weaver, CNP, Zoe Coates, CNP, Cheyanne White, CNS, Heide Collier, CNP, Amber Mast CNP, Tracy Schwab, CNP         [facility]   IN-PATIENT SERVICE   [facility]    HISTORY AND PHYSICAL EXAMINATION            Date:   7/9/2024  Patient name:  Mariangel bAreu  Date of admission:  7/8/2024  9:08 PM  MRN:   [id number]  Account:  [id number]  YOB: 1950  PCP:    Carla Chua APRN - CNP  Room:   43/  Code Status:    Full Code    Chief Complaint:     Chief Complaint   Patient presents with    Chest Pain       History Obtained From:     patient    History of Present Illness:     Mariangel Abreu is a 73 y.o. Non- / non  female who presents with Chest Pain   and is admitted to the hospital for the management of Hyperglycemia.    73-year-old female with past medical history of diabetes, asthma, A-fib, chronic kidney disease, history of coronary artery disease with 4 stents placement , recent MI on February-24 who presented to emergency

## 2024-07-09 NOTE — CONSULTS
[facility]    Consult / H&P               Today's Date: 7/9/2024  Patient Name: Mariangel Abreu  Date of admission: 7/8/2024  9:08 PM  Patient's age: 73 y.o., 1950  Admission Dx: Hyperglycemia [R73.9]    Requesting Physician: Josi Saldana MD    Cardiac Evaluation Reason: Chest pain    History Obtained From: patient/chart review     History of Present Illness:    This patient 73 y.o. years old female with past medical history as below.  Patient presented to ER for evaluation of dull substernal chest pain without any radiation.  No relief of chest pain with nitroglycerin.  High-sensitivity troponins x 2 are negative.  EKG normal sinus rhythm with nonspecific changes, very similar to previous EKGs.  Patient does have previous history of recent STEMI requiring PCI to RCA and subsequently staged PCI to LAD.    Past Medical History:   has a past medical history of Abdominal bloating, Adenomatous polyp of ascending colon, Allergic rhinitis, Anxiety, Arthritis, Asthma, Atrial fibrillation ([facility]), Back pain, Benign hypertension with CKD (chronic kidney disease) stage III ([facility]), CAD (coronary artery disease), Caffeine use, CHF (congestive heart failure) ([facility]), Chronic kidney disease, CKD (chronic kidney disease) stage 3, GFR 30-59 ml/min ([facility]), Claudication ([facility]), COPD (chronic obstructive pulmonary disease) ([facility]), Degeneration of lumbar or lumbosacral intervertebral disc, Depression, DM (diabetes mellitus) ([facility]), Emphysema of lung ([facility]), Gastritis, GERD (gastroesophageal reflux disease), Hearing loss, Hematuria, Hiatal hernia, History of loop recorder, HTN (hypertension), benign, Hypertriglyceridemia, Incontinence of urine, Irritable bowel syndrome with both constipation and diarrhea, Kidney stone, Knee arthropathy, Long term (current) use of anticoagulants, Lumbosacral spondylosis without myelopathy, Migraine headache, Mumps, Neuropathy, Obesity, On home oxygen therapy, HIGINIO on CPAP, Otitis

## 2024-07-09 NOTE — RT PROTOCOL NOTE
RT Nebulizer Bronchodilator Protocol Note    There is a bronchodilator order in the chart from a provider indicating to follow the RT Bronchodilator Protocol and there is an “Initiate RT Bronchodilator Protocol” order as well (see protocol at bottom of note).    CXR Findings:  XR CHEST PORTABLE    Result Date: 7/8/2024  Hazy opacities in the lower lungs bilaterally which is similar to appearance from prior study and may reflect chronic interstitial changes or atelectasis.       The findings from the last RT Protocol Assessment were as follows:  Smoking: Chronic pulmonary disease  Respiratory Pattern: Regular pattern and RR 12-20 bpm  Breath Sounds: Clear breath sounds  Cough: Strong, spontaneous, non-productive  Indication for Bronchodilator Therapy:    Bronchodilator Assessment Score: 2    Aerosolized bronchodilator medication orders have been revised according to the RT Nebulizer Bronchodilator Protocol below.    Respiratory Therapist to perform RT Therapy Protocol Assessment initially then follow the protocol.  Repeat RT Therapy Protocol Assessment PRN for score 0-3 or on second treatment, BID, and PRN for scores above 3.    No Indications - adjust the frequency to every 6 hours PRN wheezing or bronchospasm, if no treatments needed after 48 hours then discontinue using Per Protocol order mode.     If indication present, adjust the RT bronchodilator orders based on the Bronchodilator Assessment Score as indicated below.  If a patient is on this medication at home then do not decrease Frequency below that used at home.    0-3 - enter or revise RT bronchodilator order(s) to equivalent RT Bronchodilator order with Frequency of every 4 hours PRN for wheezing or increased work of breathing using Per Protocol order mode.       4-6 - enter or revise RT Bronchodilator order(s) to two equivalent RT bronchodilator orders with one order with BID Frequency and one order with Frequency of every 4 hours PRN wheezing or

## 2024-07-09 NOTE — ED PROVIDER NOTES
[facility] ED  Emergency Department  Faculty Sign-Out Addendum     Care of Mariangel Abreu was assumed from previous attending and is being seen for Chest Pain  .  The patient's initial evaluation and plan have been discussed with the prior provider who initially evaluated the patient.      I reviewed the resident’s note and agree with the documented findings and plan of care. Any areas of disagreement are noted on the chart. I was personally present for the key portions of any procedures. I have documented in the chart those procedures where I was not present during the key portions. I have reviewed the emergency nurses triage note. I agree with the chief complaint, past medical history, past surgical history, allergies, medications, social and family history as documented unless otherwise noted below.      EMERGENCY DEPARTMENT COURSE / MEDICAL DECISION MAKING:       MEDICATIONS GIVEN:  Orders Placed This Encounter   Medications    acetaminophen (TYLENOL) tablet 1,000 mg    methylPREDNISolone sodium succ (SOLU-MEDROL) 40 mg in sterile water 1 mL injection    methylPREDNISolone sodium succ (SOLU-MEDROL) 40 mg in sterile water 1 mL injection    diphenhydrAMINE (BENADRYL) injection 50 mg    insulin lispro (HUMALOG,ADMELOG) injection vial 10 Units    sodium chloride 0.9 % bolus 500 mL    insulin lispro (HUMALOG,ADMELOG) injection vial 10 Units    sodium chloride 0.9 % bolus 500 mL    iopamidol (ISOVUE-370) 76 % injection 75 mL    oxyCODONE (ROXICODONE) immediate release tablet 5 mg       LABS / RADIOLOGY:     Labs Reviewed   CBC WITH AUTO DIFFERENTIAL - Abnormal; Notable for the following components:       Result Value    Immature Granulocytes % 1 (*)     All other components within normal limits   BASIC METABOLIC PANEL - Abnormal; Notable for the following components:    Sodium 131 (*)     Chloride 92 (*)     Glucose 615 (*)     Creatinine 1.2 (*)     Est, Glom Filt Rate 46 (*)     All other 
     [facility] ED  Emergency Department Encounter  Emergency Medicine Resident     Pt Name:Mariangel Abreu  MRN: [id number]  Birthdate 1950  Date of evaluation: 7/8/24  PCP:  Carla Chua APRN - CNP  Note Started: 9:40 PM EDT      CHIEF COMPLAINT       Chief Complaint   Patient presents with    Chest Pain       HISTORY OF PRESENT ILLNESS  (Location/Symptom, Timing/Onset, Context/Setting, Quality, Duration, Modifying Factors, Severity.)      Mariangel Abreu is a 73 y.o. female with history of asthma/COPD, A-fib, CAD status post stent placement, CKD, claudication, diabetes who presents with left-sided chest pain that started around 3:00 today.  Patient states she was just sitting watching TV when the chest pain started.  She describes it as sharp in nature.  It is radiating to her left shoulder and the back of her neck.  Patient did take a nitro at home but it did not help.  Patient states that after the chest pain started, she got up to go to the bathroom a little bit later and felt a little bit short of breath and nauseous and lightheaded on the walk there but did not pass out.  No fevers or chills.  No upper respiratory symptoms.  Patient is on Brilinta.  Patient does wear oxygen as needed.  She has not had any increase in her oxygen requirements.  EMS did give her full dose aspirin which did not help with the chest pain either.  Patient did go to her cardiac rehab earlier today.    PAST MEDICAL / SURGICAL / SOCIAL / FAMILY HISTORY      has a past medical history of Abdominal bloating, Adenomatous polyp of ascending colon, Allergic rhinitis, Anxiety, Arthritis, Asthma, Atrial fibrillation (HCC), Back pain, Benign hypertension with CKD (chronic kidney disease) stage III (HCC), CAD (coronary artery disease), Caffeine use, CHF (congestive heart failure) ([facility]), Chronic kidney disease, CKD (chronic kidney disease) stage 3, GFR 30-59 ml/min (HCC), Claudication (HCC), COPD (chronic 
I performed a history and physical examination of the patient and discussed management with the resident. I reviewed the resident’s note and agree with the documented findings and plan of care. Any areas of disagreement are noted on the chart. I was personally present for the key portions of any procedures. I have documented in the chart those procedures where I was not present during the key portions. Unless noted in my documentation, I agree with the chief complaint, past medical history, past surgical history, allergies, medications, social and family history as documented. Documentation of the HPI, Physical Exam and Medical Decision Making performed by medical students or scribes is based on my personal performance of the HPI, PE and MDM.   For Phys Assistant/ Nurse Practitioner cases/documentation I have personally evaluated this patient and have completed at least one if not all key elements of the E/M (history, physical exam, and MDM). I find the patient's history and physical exam are consistent with the NP/PA documentation. I agree with the care provided, treatment rendered, disposition and followup plan.   Additional findings are as noted.    Praveen Adan MD  Attending Emergency  Physician        This patient presented to the [facility] part with complaints of chest pain radiating from the right side of the chest to the left upper extremity.  She reports the symptoms are not as severe as those that she normally associates with her previous coronary disease.  Patient does report subsequent resolution of the chest pain but development of nausea, diaphoresis, and shortness of breath.  Denies abdominal pain.  No vomiting.  No fevers or chills.  No cough.  No history of trauma.  Patient does have a prior history of coronary artery disease and PCI.  She reports she is taking her prescriptions as directed.  At the time my evaluation patient reports her symptoms have improved significantly although now not 
[KR]      0028 POC Glucose(!!): 438  Will give another 10 units and another 500 cc bolus [KR]   0110 History: 1  EC  Patient Age: 1  *Risk factors for Atherosclerotic disease: Diabetes Mellitus; Obesity; Coronary Artery Disease  Risk Factors: 2  Troponin: 0  Heart Score Total: 5  [KR]   0111 Signed out to Dr. Gutierrez.  Following up CT PE, glucose levels, and plan for admission. [KR]   0309 POC Glucose Fingerstick(!):    POC Glucose 304(!) [KM]   0500 CT CHEST PULMONARY EMBOLISM W CONTRAST  IMPRESSION:  1. No evidence of pulmonary embolism or acute pulmonary abnormality.  2. Small hiatal hernia. [KM]   0517 [facility] accepts patient and requests repeat POCT glucose. [KM]      ED Course User Index  [KM] Buddy Gutierrez MD  [KR] Tomasz Calvo MD       OUTSTANDING TASKS / RECOMMENDATIONS:    CT PE  Glucose recheck  Admission     FINAL IMPRESSION:   No diagnosis found.    DISPOSITION:         DISPOSITION:  []  Discharge   []  Transfer -    [x]  Admission -     []  Against Medical Advice   []  Eloped   FOLLOW-UP: No follow-up provider specified.   DISCHARGE MEDICATIONS: New Prescriptions    No medications on file           Buddy Gutierrez MD  Emergency Medicine Resident  [facility]

## 2024-07-09 NOTE — CARE COORDINATION
Discharge  Planning Plans to return home independently, has home 02 only uses prn will need cab home

## 2024-07-10 ENCOUNTER — APPOINTMENT (OUTPATIENT)
Dept: NUCLEAR MEDICINE | Age: 74
DRG: 638 | End: 2024-07-10
Payer: MEDICARE

## 2024-07-10 ENCOUNTER — HOSPITAL ENCOUNTER (OUTPATIENT)
Dept: CARDIAC REHAB | Age: 74
Setting detail: THERAPIES SERIES
Discharge: HOME | End: 2024-07-10
Payer: MEDICARE

## 2024-07-10 ENCOUNTER — HOSPITAL ENCOUNTER (OUTPATIENT)
Age: 74
Setting detail: OBSERVATION
Discharge: HOME | End: 2024-07-12
Payer: MEDICARE

## 2024-07-10 PROBLEM — R07.9 CHEST PAIN: Status: ACTIVE | Noted: 2024-07-10

## 2024-07-10 LAB
ANION GAP SERPL CALCULATED.3IONS-SCNC: 11 MMOL/L (ref 9–16)
ANION GAP SERPL CALCULATED.3IONS-SCNC: 8 MMOL/L (ref 9–16)
BUN SERPL-MCNC: 16 MG/DL (ref 8–23)
BUN SERPL-MCNC: 17 MG/DL (ref 8–23)
BUN SERPL-MCNC: 19 MG/DL (ref 8–23)
BUN SERPL-MCNC: 21 MG/DL (ref 8–23)
CALCIUM SERPL-MCNC: 8.2 MG/DL (ref 8.6–10.4)
CALCIUM SERPL-MCNC: 8.2 MG/DL (ref 8.6–10.4)
CALCIUM SERPL-MCNC: 8.3 MG/DL (ref 8.6–10.4)
CALCIUM SERPL-MCNC: 8.4 MG/DL (ref 8.6–10.4)
CHLORIDE SERPL-SCNC: 103 MMOL/L (ref 98–107)
CHLORIDE SERPL-SCNC: 106 MMOL/L (ref 98–107)
CHLORIDE SERPL-SCNC: 107 MMOL/L (ref 98–107)
CHLORIDE SERPL-SCNC: 108 MMOL/L (ref 98–107)
CO2 SERPL-SCNC: 22 MMOL/L (ref 20–31)
CO2 SERPL-SCNC: 23 MMOL/L (ref 20–31)
CO2 SERPL-SCNC: 24 MMOL/L (ref 20–31)
CO2 SERPL-SCNC: 26 MMOL/L (ref 20–31)
CREAT SERPL-MCNC: 0.9 MG/DL (ref 0.5–0.9)
CREAT SERPL-MCNC: 0.9 MG/DL (ref 0.5–0.9)
CREAT SERPL-MCNC: 1 MG/DL (ref 0.5–0.9)
CREAT SERPL-MCNC: 1.3 MG/DL (ref 0.5–0.9)
ECHO BSA: 2.1 M2
EKG ATRIAL RATE: 89 BPM
EKG P AXIS: 76 DEGREES
EKG P-R INTERVAL: 134 MS
EKG Q-T INTERVAL: 358 MS
EKG QRS DURATION: 70 MS
EKG QTC CALCULATION (BAZETT): 435 MS
EKG R AXIS: -57 DEGREES
EKG T AXIS: 92 DEGREES
EKG VENTRICULAR RATE: 89 BPM
GFR, ESTIMATED: 45 ML/MIN/1.73M2
GFR, ESTIMATED: 63 ML/MIN/1.73M2
GFR, ESTIMATED: 65 ML/MIN/1.73M2
GFR, ESTIMATED: 68 ML/MIN/1.73M2
GLUCOSE BLD-MCNC: 138 MG/DL (ref 65–105)
GLUCOSE BLD-MCNC: 143 MG/DL (ref 65–105)
GLUCOSE BLD-MCNC: 146 MG/DL (ref 65–105)
GLUCOSE BLD-MCNC: 150 MG/DL (ref 65–105)
GLUCOSE BLD-MCNC: 151 MG/DL (ref 65–105)
GLUCOSE BLD-MCNC: 152 MG/DL (ref 65–105)
GLUCOSE BLD-MCNC: 155 MG/DL (ref 65–105)
GLUCOSE BLD-MCNC: 160 MG/DL (ref 65–105)
GLUCOSE BLD-MCNC: 167 MG/DL (ref 65–105)
GLUCOSE BLD-MCNC: 170 MG/DL (ref 65–105)
GLUCOSE BLD-MCNC: 173 MG/DL (ref 65–105)
GLUCOSE BLD-MCNC: 177 MG/DL (ref 65–105)
GLUCOSE BLD-MCNC: 256 MG/DL (ref 65–105)
GLUCOSE BLD-MCNC: 274 MG/DL (ref 65–105)
GLUCOSE SERPL-MCNC: 120 MG/DL (ref 74–99)
GLUCOSE SERPL-MCNC: 135 MG/DL (ref 74–99)
GLUCOSE SERPL-MCNC: 166 MG/DL (ref 74–99)
GLUCOSE SERPL-MCNC: 271 MG/DL (ref 74–99)
NUC STRESS EJECTION FRACTION: 78 %
PHOSPHATE SERPL-MCNC: 2.3 MG/DL (ref 2.5–4.5)
PHOSPHATE SERPL-MCNC: 2.5 MG/DL (ref 2.5–4.5)
PHOSPHATE SERPL-MCNC: 2.5 MG/DL (ref 2.5–4.5)
PHOSPHATE SERPL-MCNC: 2.7 MG/DL (ref 2.5–4.5)
POTASSIUM SERPL-SCNC: 3.7 MMOL/L (ref 3.7–5.3)
POTASSIUM SERPL-SCNC: 3.8 MMOL/L (ref 3.7–5.3)
POTASSIUM SERPL-SCNC: 3.9 MMOL/L (ref 3.7–5.3)
POTASSIUM SERPL-SCNC: 4.1 MMOL/L (ref 3.7–5.3)
SODIUM SERPL-SCNC: 137 MMOL/L (ref 136–145)
SODIUM SERPL-SCNC: 138 MMOL/L (ref 136–145)
SODIUM SERPL-SCNC: 139 MMOL/L (ref 136–145)
SODIUM SERPL-SCNC: 140 MMOL/L (ref 136–145)
STRESS BASELINE DIAS BP: 62 MMHG
STRESS BASELINE HR: 51 BPM
STRESS BASELINE SYS BP: 102 MMHG
STRESS ESTIMATED WORKLOAD: 1 METS
STRESS PEAK DIAS BP: 58 MMHG
STRESS PEAK SYS BP: 105 MMHG
STRESS PERCENT HR ACHIEVED: 43 %
STRESS POST PEAK HR: 63 BPM
STRESS RATE PRESSURE PRODUCT: 6615 BPM*MMHG
STRESS TARGET HR: 146 BPM
TID: 1.69

## 2024-07-10 PROCEDURE — 94640 AIRWAY INHALATION TREATMENT: CPT

## 2024-07-10 PROCEDURE — 80048 BASIC METABOLIC PNL TOTAL CA: CPT

## 2024-07-10 PROCEDURE — 2580000003 HC RX 258: Performed by: STUDENT IN AN ORGANIZED HEALTH CARE EDUCATION/TRAINING PROGRAM

## 2024-07-10 PROCEDURE — A9500 TC99M SESTAMIBI: HCPCS | Performed by: STUDENT IN AN ORGANIZED HEALTH CARE EDUCATION/TRAINING PROGRAM

## 2024-07-10 PROCEDURE — 99233 SBSQ HOSP IP/OBS HIGH 50: CPT | Performed by: INTERNAL MEDICINE

## 2024-07-10 PROCEDURE — 6360000002 HC RX W HCPCS: Performed by: STUDENT IN AN ORGANIZED HEALTH CARE EDUCATION/TRAINING PROGRAM

## 2024-07-10 PROCEDURE — 96372 THER/PROPH/DIAG INJ SC/IM: CPT

## 2024-07-10 PROCEDURE — 36415 COLL VENOUS BLD VENIPUNCTURE: CPT

## 2024-07-10 PROCEDURE — 6360000002 HC RX W HCPCS: Performed by: NURSE PRACTITIONER

## 2024-07-10 PROCEDURE — 6370000000 HC RX 637 (ALT 250 FOR IP): Performed by: NURSE PRACTITIONER

## 2024-07-10 PROCEDURE — 93017 CV STRESS TEST TRACING ONLY: CPT

## 2024-07-10 PROCEDURE — 93016 CV STRESS TEST SUPVJ ONLY: CPT | Performed by: RADIOLOGY

## 2024-07-10 PROCEDURE — 1200000000 HC SEMI PRIVATE

## 2024-07-10 PROCEDURE — 6370000000 HC RX 637 (ALT 250 FOR IP): Performed by: INTERNAL MEDICINE

## 2024-07-10 PROCEDURE — 2500000003 HC RX 250 WO HCPCS: Performed by: STUDENT IN AN ORGANIZED HEALTH CARE EDUCATION/TRAINING PROGRAM

## 2024-07-10 PROCEDURE — G0108 DIAB MANAGE TRN  PER INDIV: HCPCS

## 2024-07-10 PROCEDURE — 2580000003 HC RX 258: Performed by: NURSE PRACTITIONER

## 2024-07-10 PROCEDURE — 3430000000 HC RX DIAGNOSTIC RADIOPHARMACEUTICAL: Performed by: STUDENT IN AN ORGANIZED HEALTH CARE EDUCATION/TRAINING PROGRAM

## 2024-07-10 PROCEDURE — 78452 HT MUSCLE IMAGE SPECT MULT: CPT

## 2024-07-10 PROCEDURE — 96366 THER/PROPH/DIAG IV INF ADDON: CPT

## 2024-07-10 PROCEDURE — 93018 CV STRESS TEST I&R ONLY: CPT | Performed by: RADIOLOGY

## 2024-07-10 PROCEDURE — 82947 ASSAY GLUCOSE BLOOD QUANT: CPT

## 2024-07-10 PROCEDURE — 84100 ASSAY OF PHOSPHORUS: CPT

## 2024-07-10 PROCEDURE — 6370000000 HC RX 637 (ALT 250 FOR IP): Performed by: STUDENT IN AN ORGANIZED HEALTH CARE EDUCATION/TRAINING PROGRAM

## 2024-07-10 RX ORDER — SODIUM CHLORIDE 9 MG/ML
500 INJECTION, SOLUTION INTRAVENOUS CONTINUOUS PRN
Status: DISCONTINUED | OUTPATIENT
Start: 2024-07-10 | End: 2024-07-10

## 2024-07-10 RX ORDER — ATROPINE SULFATE 0.1 MG/ML
0.5 INJECTION INTRAVENOUS EVERY 5 MIN PRN
Status: DISCONTINUED | OUTPATIENT
Start: 2024-07-10 | End: 2024-07-10

## 2024-07-10 RX ORDER — NITROGLYCERIN 0.4 MG/1
0.4 TABLET SUBLINGUAL EVERY 5 MIN PRN
Status: DISCONTINUED | OUTPATIENT
Start: 2024-07-10 | End: 2024-07-10

## 2024-07-10 RX ORDER — ALBUTEROL SULFATE 90 UG/1
2 AEROSOL, METERED RESPIRATORY (INHALATION) PRN
Status: DISCONTINUED | OUTPATIENT
Start: 2024-07-10 | End: 2024-07-10

## 2024-07-10 RX ORDER — METOPROLOL TARTRATE 1 MG/ML
5 INJECTION, SOLUTION INTRAVENOUS EVERY 5 MIN PRN
Status: DISCONTINUED | OUTPATIENT
Start: 2024-07-10 | End: 2024-07-10

## 2024-07-10 RX ORDER — REGADENOSON 0.08 MG/ML
0.4 INJECTION, SOLUTION INTRAVENOUS
Status: COMPLETED | OUTPATIENT
Start: 2024-07-10 | End: 2024-07-10

## 2024-07-10 RX ORDER — INSULIN LISPRO 100 [IU]/ML
0-8 INJECTION, SOLUTION INTRAVENOUS; SUBCUTANEOUS
Status: DISCONTINUED | OUTPATIENT
Start: 2024-07-10 | End: 2024-07-12

## 2024-07-10 RX ORDER — INSULIN LISPRO 100 [IU]/ML
0-4 INJECTION, SOLUTION INTRAVENOUS; SUBCUTANEOUS NIGHTLY
Status: DISCONTINUED | OUTPATIENT
Start: 2024-07-10 | End: 2024-07-12

## 2024-07-10 RX ORDER — SODIUM CHLORIDE 0.9 % (FLUSH) 0.9 %
10 SYRINGE (ML) INJECTION PRN
Status: DISCONTINUED | OUTPATIENT
Start: 2024-07-10 | End: 2024-07-14 | Stop reason: HOSPADM

## 2024-07-10 RX ORDER — INSULIN GLARGINE 100 [IU]/ML
20 INJECTION, SOLUTION SUBCUTANEOUS ONCE
Status: COMPLETED | OUTPATIENT
Start: 2024-07-10 | End: 2024-07-10

## 2024-07-10 RX ORDER — AMINOPHYLLINE 25 MG/ML
50 INJECTION, SOLUTION INTRAVENOUS PRN
Status: DISCONTINUED | OUTPATIENT
Start: 2024-07-10 | End: 2024-07-10

## 2024-07-10 RX ORDER — SODIUM CHLORIDE 0.9 % (FLUSH) 0.9 %
5-40 SYRINGE (ML) INJECTION PRN
Status: DISCONTINUED | OUTPATIENT
Start: 2024-07-10 | End: 2024-07-10

## 2024-07-10 RX ORDER — TETRAKIS(2-METHOXYISOBUTYLISOCYANIDE)COPPER(I) TETRAFLUOROBORATE 1 MG/ML
31.3 INJECTION, POWDER, LYOPHILIZED, FOR SOLUTION INTRAVENOUS
Status: COMPLETED | OUTPATIENT
Start: 2024-07-10 | End: 2024-07-10

## 2024-07-10 RX ORDER — TETRAKIS(2-METHOXYISOBUTYLISOCYANIDE)COPPER(I) TETRAFLUOROBORATE 1 MG/ML
38.5 INJECTION, POWDER, LYOPHILIZED, FOR SOLUTION INTRAVENOUS
Status: COMPLETED | OUTPATIENT
Start: 2024-07-10 | End: 2024-07-09

## 2024-07-10 RX ORDER — INSULIN GLARGINE 100 [IU]/ML
40 INJECTION, SOLUTION SUBCUTANEOUS NIGHTLY
Status: DISCONTINUED | OUTPATIENT
Start: 2024-07-10 | End: 2024-07-12

## 2024-07-10 RX ADMIN — SODIUM CHLORIDE 2.8 UNITS/HR: 9 INJECTION, SOLUTION INTRAVENOUS at 08:54

## 2024-07-10 RX ADMIN — SODIUM CHLORIDE, PRESERVATIVE FREE 10 ML: 5 INJECTION INTRAVENOUS at 11:19

## 2024-07-10 RX ADMIN — OXYBUTYNIN CHLORIDE 5 MG: 5 TABLET, EXTENDED RELEASE ORAL at 21:02

## 2024-07-10 RX ADMIN — ENOXAPARIN SODIUM 30 MG: 100 INJECTION SUBCUTANEOUS at 21:02

## 2024-07-10 RX ADMIN — REGADENOSON 0.4 MG: 0.08 INJECTION, SOLUTION INTRAVENOUS at 11:29

## 2024-07-10 RX ADMIN — TOPIRAMATE 100 MG: 100 TABLET, FILM COATED ORAL at 21:02

## 2024-07-10 RX ADMIN — TICAGRELOR 90 MG: 90 TABLET ORAL at 21:02

## 2024-07-10 RX ADMIN — METOPROLOL TARTRATE 25 MG: 50 TABLET, FILM COATED ORAL at 08:48

## 2024-07-10 RX ADMIN — TETRAKIS(2-METHOXYISOBUTYLISOCYANIDE)COPPER(I) TETRAFLUOROBORATE 31.3 MILLICURIE: 1 INJECTION, POWDER, LYOPHILIZED, FOR SOLUTION INTRAVENOUS at 11:30

## 2024-07-10 RX ADMIN — BUDESONIDE AND FORMOTEROL FUMARATE DIHYDRATE 2 PUFF: 80; 4.5 AEROSOL RESPIRATORY (INHALATION) at 21:51

## 2024-07-10 RX ADMIN — SODIUM CHLORIDE, PRESERVATIVE FREE 10 ML: 5 INJECTION INTRAVENOUS at 11:30

## 2024-07-10 RX ADMIN — ESCITALOPRAM OXALATE 20 MG: 10 TABLET ORAL at 08:48

## 2024-07-10 RX ADMIN — ASPIRIN 81 MG: 81 TABLET, COATED ORAL at 08:48

## 2024-07-10 RX ADMIN — INSULIN LISPRO 4 UNITS: 100 INJECTION, SOLUTION INTRAVENOUS; SUBCUTANEOUS at 17:12

## 2024-07-10 RX ADMIN — OXYCODONE 5 MG: 5 TABLET ORAL at 21:14

## 2024-07-10 RX ADMIN — INSULIN GLARGINE 20 UNITS: 100 INJECTION, SOLUTION SUBCUTANEOUS at 14:16

## 2024-07-10 RX ADMIN — DEXTROSE AND SODIUM CHLORIDE: 5; 450 INJECTION, SOLUTION INTRAVENOUS at 13:17

## 2024-07-10 RX ADMIN — Medication 6 MG: at 21:33

## 2024-07-10 RX ADMIN — SODIUM PHOSPHATE, MONOBASIC, MONOHYDRATE AND SODIUM PHOSPHATE, DIBASIC, ANHYDROUS 15 MMOL: 142; 276 INJECTION, SOLUTION INTRAVENOUS at 23:14

## 2024-07-10 RX ADMIN — ROSUVASTATIN CALCIUM 40 MG: 20 TABLET, FILM COATED ORAL at 21:02

## 2024-07-10 RX ADMIN — SODIUM CHLORIDE 2.8 UNITS/HR: 9 INJECTION, SOLUTION INTRAVENOUS at 07:56

## 2024-07-10 RX ADMIN — PANTOPRAZOLE SODIUM 40 MG: 40 TABLET, DELAYED RELEASE ORAL at 17:11

## 2024-07-10 RX ADMIN — INSULIN GLARGINE 40 UNITS: 100 INJECTION, SOLUTION SUBCUTANEOUS at 21:01

## 2024-07-10 RX ADMIN — OXYCODONE 5 MG: 5 TABLET ORAL at 08:57

## 2024-07-10 RX ADMIN — ISOSORBIDE DINITRATE 30 MG: 10 TABLET ORAL at 08:48

## 2024-07-10 RX ADMIN — SODIUM CHLORIDE 3.3 UNITS/HR: 9 INJECTION, SOLUTION INTRAVENOUS at 13:07

## 2024-07-10 RX ADMIN — ENOXAPARIN SODIUM 30 MG: 100 INJECTION SUBCUTANEOUS at 08:44

## 2024-07-10 RX ADMIN — SODIUM CHLORIDE, PRESERVATIVE FREE 10 ML: 5 INJECTION INTRAVENOUS at 09:00

## 2024-07-10 RX ADMIN — TICAGRELOR 90 MG: 90 TABLET ORAL at 08:48

## 2024-07-10 RX ADMIN — SODIUM CHLORIDE, PRESERVATIVE FREE 10 ML: 5 INJECTION INTRAVENOUS at 21:01

## 2024-07-10 RX ADMIN — PANTOPRAZOLE SODIUM 40 MG: 40 TABLET, DELAYED RELEASE ORAL at 06:39

## 2024-07-10 ASSESSMENT — PAIN DESCRIPTION - LOCATION
LOCATION: BACK
LOCATION: BACK

## 2024-07-10 ASSESSMENT — PAIN DESCRIPTION - DESCRIPTORS
DESCRIPTORS: STABBING
DESCRIPTORS: ACHING;DISCOMFORT

## 2024-07-10 ASSESSMENT — PAIN SCALES - GENERAL
PAINLEVEL_OUTOF10: 4
PAINLEVEL_OUTOF10: 6
PAINLEVEL_OUTOF10: 8
PAINLEVEL_OUTOF10: 10

## 2024-07-10 ASSESSMENT — PAIN DESCRIPTION - ORIENTATION
ORIENTATION: RIGHT;LEFT;MID
ORIENTATION: LOWER;MID

## 2024-07-10 NOTE — ED NOTES
ED to inpatient nurses report      Chief Complaint:  Chief Complaint   Patient presents with    Chest Pain     Present to ED from: home    MOA:     LOC: alert and orientated to name, place, date  Mobility: Requires assistance * 2  Oxygen Baseline: 97%     Current needs required: 1L of O2   Pending ED orders: na  Present condition: stable     Why did the patient come to the ED? Chest pain   What is the plan? Admission   Any procedures or intervention occur? CT labs, medication   Any safety concerns?? na    Mental Status:  Level of Consciousness: Alert (0)    Psych Assessment:   Psychosocial  Psychosocial (WDL): Within Defined Limits  Vital signs   Vitals:    07/09/24 0245 07/09/24 0300 07/09/24 0315 07/09/24 0415   BP: (!) 146/66 (!) 152/68 134/79 (!) 140/67   Pulse: 69 80 71 68   Resp: 19 18 20 23   Temp:       TempSrc:       SpO2: 94% 94% 95% 94%   Weight:       Height:            Vitals:  Patient Vitals for the past 24 hrs:   BP Temp Temp src Pulse Resp SpO2 Height Weight   07/09/24 0415 (!) 140/67 -- -- 68 23 94 % -- --   07/09/24 0315 134/79 -- -- 71 20 95 % -- --   07/09/24 0300 (!) 152/68 -- -- 80 18 94 % -- --   07/09/24 0245 (!) 146/66 -- -- 69 19 94 % -- --   07/09/24 0230 (!) 149/78 -- -- 75 20 96 % -- --   07/09/24 0215 130/65 -- -- 73 19 94 % -- --   07/09/24 0130 131/86 -- -- 78 22 95 % -- --   07/09/24 0015 105/87 -- -- 72 18 95 % -- --   07/08/24 2111 (!) 123/99 -- -- 90 13 93 % 1.575 m (5' 2\") 101.2 kg (223 lb)   07/08/24 2110 -- 98.4 °F (36.9 °C) Oral -- -- -- -- --      Visit Vitals  BP (!) 140/67   Pulse 68   Temp 98.4 °F (36.9 °C) (Oral)   Resp 23   Ht 1.575 m (5' 2\")   Wt 101.2 kg (223 lb)   SpO2 94%   BMI 40.79 kg/m²        LDAs:   Peripheral IV 07/08/24 Left;Anterior Forearm (Active)       Ambulatory Status:  Presents to emergency department  because of falls (Syncope, seizure, or loss of consciousness): No, Age > 70: Yes, Altered Mental Status, Intoxication with alcohol or substance confusion 
Pt to Ed via ambulance with complaints of chest pain the radiates from the right side of the chest to the left. Pt stated that she was sitting on her couch around 3p today when the pain started. Pt took one nitro and aspirin with no relieve. Pt has a history of heart attack, and 4 stents placed. Pt is A&Ox4, respirations are even and non-labored pt is on 1L of oxygen. Pt wears one liter of oxygen at home.   
Pt's blood sugar is 304    
Pt's blood sugar is 377  
Pt's blood sugar is 591  
components within normal limits   POC GLUCOSE FINGERSTICK - Abnormal; Notable for the following components:    POC Glucose 508 (*)     All other components within normal limits   POC GLUCOSE FINGERSTICK - Abnormal; Notable for the following components:    POC Glucose 370 (*)     All other components within normal limits   POC GLUCOSE FINGERSTICK - Abnormal; Notable for the following components:    POC Glucose 383 (*)     All other components within normal limits   POC GLUCOSE FINGERSTICK - Abnormal; Notable for the following components:    POC Glucose 356 (*)     All other components within normal limits   POC GLUCOSE FINGERSTICK - Abnormal; Notable for the following components:    POC Glucose 273 (*)     All other components within normal limits   POC GLUCOSE FINGERSTICK - Abnormal; Notable for the following components:    POC Glucose 222 (*)     All other components within normal limits   TROPONIN   TROPONIN   TROPONIN   PHOSPHORUS   PHOSPHORUS   MAGNESIUM   BASIC METABOLIC PANEL   PHOSPHORUS   BASIC METABOLIC PANEL W/ REFLEX TO MG FOR LOW K   BASIC METABOLIC PANEL   PHOSPHORUS   POCT GLUCOSE   POCT GLUCOSE   POCT GLUCOSE   POCT GLUCOSE       Electronically signed by Marty Downing RN on 7/9/2024 at 10:11 PM

## 2024-07-10 NOTE — PLAN OF CARE
Problem: Safety - Adult  Goal: Free from fall injury  Outcome: Progressing     Problem: Chronic Conditions and Co-morbidities  Goal: Patient's chronic conditions and co-morbidity symptoms are monitored and maintained or improved  Outcome: Progressing     Problem: Pain  Goal: Verbalizes/displays adequate comfort level or baseline comfort level  Outcome: Progressing     Problem: Skin/Tissue Integrity  Goal: Absence of new skin breakdown  Description: 1.  Monitor for areas of redness and/or skin breakdown  2.  Assess vascular access sites hourly  3.  Every 4-6 hours minimum:  Change oxygen saturation probe site  4.  Every 4-6 hours:  If on nasal continuous positive airway pressure, respiratory therapy assess nares and determine need for appliance change or resting period.  Outcome: Progressing

## 2024-07-11 LAB
ANION GAP SERPL CALCULATED.3IONS-SCNC: 10 MMOL/L (ref 9–16)
ANION GAP SERPL CALCULATED.3IONS-SCNC: 10 MMOL/L (ref 9–16)
ANION GAP SERPL CALCULATED.3IONS-SCNC: 8 MMOL/L (ref 9–16)
BUN SERPL-MCNC: 18 MG/DL (ref 8–23)
CALCIUM SERPL-MCNC: 8 MG/DL (ref 8.6–10.4)
CALCIUM SERPL-MCNC: 8.1 MG/DL (ref 8.6–10.4)
CALCIUM SERPL-MCNC: 8.1 MG/DL (ref 8.6–10.4)
CHLORIDE SERPL-SCNC: 103 MMOL/L (ref 98–107)
CHLORIDE SERPL-SCNC: 106 MMOL/L (ref 98–107)
CHLORIDE SERPL-SCNC: 106 MMOL/L (ref 98–107)
CO2 SERPL-SCNC: 20 MMOL/L (ref 20–31)
CO2 SERPL-SCNC: 22 MMOL/L (ref 20–31)
CO2 SERPL-SCNC: 24 MMOL/L (ref 20–31)
CREAT SERPL-MCNC: 1.1 MG/DL (ref 0.5–0.9)
CREAT SERPL-MCNC: 1.1 MG/DL (ref 0.5–0.9)
CREAT SERPL-MCNC: 1.2 MG/DL (ref 0.5–0.9)
GFR, ESTIMATED: 47 ML/MIN/1.73M2
GFR, ESTIMATED: 51 ML/MIN/1.73M2
GFR, ESTIMATED: 53 ML/MIN/1.73M2
GLUCOSE BLD-MCNC: 243 MG/DL (ref 65–105)
GLUCOSE BLD-MCNC: 246 MG/DL (ref 65–105)
GLUCOSE BLD-MCNC: 256 MG/DL (ref 65–105)
GLUCOSE BLD-MCNC: 91 MG/DL (ref 65–105)
GLUCOSE SERPL-MCNC: 174 MG/DL (ref 74–99)
GLUCOSE SERPL-MCNC: 175 MG/DL (ref 74–99)
GLUCOSE SERPL-MCNC: 240 MG/DL (ref 74–99)
PHOSPHATE SERPL-MCNC: 2.9 MG/DL (ref 2.5–4.5)
PHOSPHATE SERPL-MCNC: 3.6 MG/DL (ref 2.5–4.5)
POTASSIUM SERPL-SCNC: 3.7 MMOL/L (ref 3.7–5.3)
SODIUM SERPL-SCNC: 135 MMOL/L (ref 136–145)
SODIUM SERPL-SCNC: 136 MMOL/L (ref 136–145)
SODIUM SERPL-SCNC: 138 MMOL/L (ref 136–145)

## 2024-07-11 PROCEDURE — 80048 BASIC METABOLIC PNL TOTAL CA: CPT

## 2024-07-11 PROCEDURE — 2580000003 HC RX 258: Performed by: NURSE PRACTITIONER

## 2024-07-11 PROCEDURE — 36415 COLL VENOUS BLD VENIPUNCTURE: CPT

## 2024-07-11 PROCEDURE — 97530 THERAPEUTIC ACTIVITIES: CPT

## 2024-07-11 PROCEDURE — 97116 GAIT TRAINING THERAPY: CPT

## 2024-07-11 PROCEDURE — 94761 N-INVAS EAR/PLS OXIMETRY MLT: CPT

## 2024-07-11 PROCEDURE — 99232 SBSQ HOSP IP/OBS MODERATE 35: CPT | Performed by: INTERNAL MEDICINE

## 2024-07-11 PROCEDURE — 97162 PT EVAL MOD COMPLEX 30 MIN: CPT

## 2024-07-11 PROCEDURE — 99233 SBSQ HOSP IP/OBS HIGH 50: CPT

## 2024-07-11 PROCEDURE — 94640 AIRWAY INHALATION TREATMENT: CPT

## 2024-07-11 PROCEDURE — 1200000000 HC SEMI PRIVATE

## 2024-07-11 PROCEDURE — 6370000000 HC RX 637 (ALT 250 FOR IP): Performed by: INTERNAL MEDICINE

## 2024-07-11 PROCEDURE — 6370000000 HC RX 637 (ALT 250 FOR IP): Performed by: NURSE PRACTITIONER

## 2024-07-11 PROCEDURE — 94660 CPAP INITIATION&MGMT: CPT

## 2024-07-11 PROCEDURE — 82947 ASSAY GLUCOSE BLOOD QUANT: CPT

## 2024-07-11 PROCEDURE — 6360000002 HC RX W HCPCS: Performed by: NURSE PRACTITIONER

## 2024-07-11 PROCEDURE — 84100 ASSAY OF PHOSPHORUS: CPT

## 2024-07-11 PROCEDURE — 2700000000 HC OXYGEN THERAPY PER DAY

## 2024-07-11 RX ORDER — INSULIN GLARGINE 100 [IU]/ML
20 INJECTION, SOLUTION SUBCUTANEOUS DAILY
Status: DISCONTINUED | OUTPATIENT
Start: 2024-07-11 | End: 2024-07-11

## 2024-07-11 RX ORDER — AMLODIPINE BESYLATE 5 MG/1
2.5 TABLET ORAL DAILY
Status: DISCONTINUED | OUTPATIENT
Start: 2024-07-11 | End: 2024-07-14 | Stop reason: HOSPADM

## 2024-07-11 RX ORDER — ISOSORBIDE MONONITRATE 30 MG/1
30 TABLET, EXTENDED RELEASE ORAL DAILY
Status: DISCONTINUED | OUTPATIENT
Start: 2024-07-11 | End: 2024-07-14 | Stop reason: HOSPADM

## 2024-07-11 RX ADMIN — TOPIRAMATE 100 MG: 100 TABLET, FILM COATED ORAL at 22:21

## 2024-07-11 RX ADMIN — SODIUM CHLORIDE, PRESERVATIVE FREE 10 ML: 5 INJECTION INTRAVENOUS at 08:39

## 2024-07-11 RX ADMIN — METOPROLOL TARTRATE 25 MG: 50 TABLET, FILM COATED ORAL at 21:18

## 2024-07-11 RX ADMIN — Medication 6 MG: at 21:21

## 2024-07-11 RX ADMIN — INSULIN LISPRO 2 UNITS: 100 INJECTION, SOLUTION INTRAVENOUS; SUBCUTANEOUS at 17:18

## 2024-07-11 RX ADMIN — ONDANSETRON 4 MG: 4 TABLET, ORALLY DISINTEGRATING ORAL at 12:18

## 2024-07-11 RX ADMIN — TICAGRELOR 90 MG: 90 TABLET ORAL at 08:31

## 2024-07-11 RX ADMIN — PANTOPRAZOLE SODIUM 40 MG: 40 TABLET, DELAYED RELEASE ORAL at 17:16

## 2024-07-11 RX ADMIN — BUDESONIDE AND FORMOTEROL FUMARATE DIHYDRATE 2 PUFF: 80; 4.5 AEROSOL RESPIRATORY (INHALATION) at 09:02

## 2024-07-11 RX ADMIN — ESCITALOPRAM OXALATE 20 MG: 10 TABLET ORAL at 08:31

## 2024-07-11 RX ADMIN — ASPIRIN 81 MG: 81 TABLET, COATED ORAL at 08:31

## 2024-07-11 RX ADMIN — INSULIN LISPRO 2 UNITS: 100 INJECTION, SOLUTION INTRAVENOUS; SUBCUTANEOUS at 12:10

## 2024-07-11 RX ADMIN — BUDESONIDE AND FORMOTEROL FUMARATE DIHYDRATE 2 PUFF: 80; 4.5 AEROSOL RESPIRATORY (INHALATION) at 21:35

## 2024-07-11 RX ADMIN — ENOXAPARIN SODIUM 30 MG: 100 INJECTION SUBCUTANEOUS at 21:17

## 2024-07-11 RX ADMIN — METOPROLOL TARTRATE 25 MG: 50 TABLET, FILM COATED ORAL at 08:31

## 2024-07-11 RX ADMIN — ISOSORBIDE DINITRATE 30 MG: 10 TABLET ORAL at 08:30

## 2024-07-11 RX ADMIN — OXYBUTYNIN CHLORIDE 5 MG: 5 TABLET, EXTENDED RELEASE ORAL at 21:18

## 2024-07-11 RX ADMIN — OXYCODONE 5 MG: 5 TABLET ORAL at 08:37

## 2024-07-11 RX ADMIN — INSULIN GLARGINE 20 UNITS: 100 INJECTION, SOLUTION SUBCUTANEOUS at 08:29

## 2024-07-11 RX ADMIN — PANTOPRAZOLE SODIUM 40 MG: 40 TABLET, DELAYED RELEASE ORAL at 06:07

## 2024-07-11 RX ADMIN — TICAGRELOR 90 MG: 90 TABLET ORAL at 21:19

## 2024-07-11 RX ADMIN — SODIUM CHLORIDE, PRESERVATIVE FREE 10 ML: 5 INJECTION INTRAVENOUS at 21:19

## 2024-07-11 RX ADMIN — ROSUVASTATIN CALCIUM 40 MG: 20 TABLET, FILM COATED ORAL at 21:18

## 2024-07-11 RX ADMIN — INSULIN GLARGINE 40 UNITS: 100 INJECTION, SOLUTION SUBCUTANEOUS at 21:17

## 2024-07-11 RX ADMIN — ENOXAPARIN SODIUM 30 MG: 100 INJECTION SUBCUTANEOUS at 08:30

## 2024-07-11 ASSESSMENT — PAIN DESCRIPTION - LOCATION: LOCATION: BACK

## 2024-07-11 ASSESSMENT — PAIN SCALES - GENERAL
PAINLEVEL_OUTOF10: 6
PAINLEVEL_OUTOF10: 8

## 2024-07-11 ASSESSMENT — PAIN DESCRIPTION - DESCRIPTORS: DESCRIPTORS: SHARP

## 2024-07-11 ASSESSMENT — PAIN DESCRIPTION - ORIENTATION: ORIENTATION: LOWER

## 2024-07-11 NOTE — PLAN OF CARE
Problem: Safety - Adult  Goal: Free from fall injury  7/11/2024 0408 by Yuni Rudolph RN  Outcome: Progressing  7/10/2024 1720 by Yvonne Johns RN  Outcome: Progressing     Problem: Chronic Conditions and Co-morbidities  Goal: Patient's chronic conditions and co-morbidity symptoms are monitored and maintained or improved  7/11/2024 0408 by Yuni Rudolph RN  Outcome: Progressing  7/10/2024 1720 by Yvonne Johns RN  Outcome: Progressing     Problem: Pain  Goal: Verbalizes/displays adequate comfort level or baseline comfort level  7/11/2024 0408 by Yuni Rudolph RN  Outcome: Progressing  7/10/2024 1720 by Yvonne Johns RN  Outcome: Progressing     Problem: Skin/Tissue Integrity  Goal: Absence of new skin breakdown  Description: 1.  Monitor for areas of redness and/or skin breakdown  2.  Assess vascular access sites hourly  3.  Every 4-6 hours minimum:  Change oxygen saturation probe site  4.  Every 4-6 hours:  If on nasal continuous positive airway pressure, respiratory therapy assess nares and determine need for appliance change or resting period.  7/11/2024 0408 by Yuni Rudolph RN  Outcome: Progressing  7/10/2024 1720 by Yvonne Johns RN  Outcome: Progressing     Problem: Skin/Tissue Integrity  Goal: Absence of new skin breakdown  Description: 1.  Monitor for areas of redness and/or skin breakdown  2.  Assess vascular access sites hourly  3.  Every 4-6 hours minimum:  Change oxygen saturation probe site  4.  Every 4-6 hours:  If on nasal continuous positive airway pressure, respiratory therapy assess nares and determine need for appliance change or resting period.  7/11/2024 0408 by Yuni Rudolph, RN  Outcome: Progressing  7/10/2024 1720 by Yvonne Johns RN  Outcome: Progressing     Problem: Respiratory - Adult  Goal: Achieves optimal ventilation and oxygenation  7/11/2024 0408 by Yuni Rudolph RN  Outcome: Progressing  7/11/2024 0245 by Leah Alvarez,

## 2024-07-11 NOTE — PLAN OF CARE
Problem: Respiratory - Adult  Goal: Achieves optimal ventilation and oxygenation  7/11/2024 0939 by Karen Carbone, RCP  Outcome: Progressing  Flowsheets (Taken 7/11/2024 0902)  Achieves optimal ventilation and oxygenation:   Assess for changes in respiratory status   Assess for changes in mentation and behavior   Position to facilitate oxygenation and minimize respiratory effort   Respiratory therapy support as indicated   Assess and instruct to report shortness of breath or any respiratory difficulty   Assess the need for suctioning and aspirate as needed   Oxygen supplementation based on oxygen saturation or arterial blood gases   Encourage broncho-pulmonary hygiene including cough, deep breathe, incentive spirometry

## 2024-07-11 NOTE — PLAN OF CARE
Problem: Safety - Adult  Goal: Free from fall injury  7/11/2024 1730 by Yvonne Johns RN  Outcome: Progressing  7/11/2024 0408 by Yuni Rudolph RN  Outcome: Progressing     Problem: Chronic Conditions and Co-morbidities  Goal: Patient's chronic conditions and co-morbidity symptoms are monitored and maintained or improved  7/11/2024 1730 by Yvonne Johns RN  Outcome: Progressing  7/11/2024 0408 by Yuni Rudolph RN  Outcome: Progressing     Problem: Pain  Goal: Verbalizes/displays adequate comfort level or baseline comfort level  7/11/2024 1730 by Yvonne Johns RN  Outcome: Progressing  7/11/2024 0408 by Yuni Rudolph RN  Outcome: Progressing     Problem: Skin/Tissue Integrity  Goal: Absence of new skin breakdown  Description: 1.  Monitor for areas of redness and/or skin breakdown  2.  Assess vascular access sites hourly  3.  Every 4-6 hours minimum:  Change oxygen saturation probe site  4.  Every 4-6 hours:  If on nasal continuous positive airway pressure, respiratory therapy assess nares and determine need for appliance change or resting period.  7/11/2024 1730 by Yvonne Johns RN  Outcome: Progressing  7/11/2024 0408 by Yuni Rudolph RN  Outcome: Progressing     Problem: Respiratory - Adult  Goal: Achieves optimal ventilation and oxygenation  7/11/2024 1730 by Yvonne Johns RN  Outcome: Progressing  7/11/2024 0939 by Karen Carbone, [facility]  Outcome: Progressing  Flowsheets (Taken 7/11/2024 0902)  Achieves optimal ventilation and oxygenation:   Assess for changes in respiratory status   Assess for changes in mentation and behavior   Position to facilitate oxygenation and minimize respiratory effort   Respiratory therapy support as indicated   Assess and instruct to report shortness of breath or any respiratory difficulty   Assess the need for suctioning and aspirate as needed   Oxygen supplementation based on oxygen saturation or arterial blood gases   Encourage

## 2024-07-12 LAB
ANION GAP SERPL CALCULATED.3IONS-SCNC: 8 MMOL/L (ref 9–16)
BUN SERPL-MCNC: 18 MG/DL (ref 8–23)
CALCIUM SERPL-MCNC: 8.9 MG/DL (ref 8.6–10.4)
CHLORIDE SERPL-SCNC: 104 MMOL/L (ref 98–107)
CO2 SERPL-SCNC: 26 MMOL/L (ref 20–31)
CREAT SERPL-MCNC: 1 MG/DL (ref 0.5–0.9)
GFR, ESTIMATED: 59 ML/MIN/1.73M2
GLUCOSE BLD-MCNC: 204 MG/DL (ref 65–105)
GLUCOSE BLD-MCNC: 248 MG/DL (ref 65–105)
GLUCOSE BLD-MCNC: 276 MG/DL (ref 65–105)
GLUCOSE BLD-MCNC: 291 MG/DL (ref 65–105)
GLUCOSE SERPL-MCNC: 218 MG/DL (ref 74–99)
POTASSIUM SERPL-SCNC: 4.3 MMOL/L (ref 3.7–5.3)
SODIUM SERPL-SCNC: 138 MMOL/L (ref 136–145)

## 2024-07-12 PROCEDURE — 99233 SBSQ HOSP IP/OBS HIGH 50: CPT | Performed by: NURSE PRACTITIONER

## 2024-07-12 PROCEDURE — 1200000000 HC SEMI PRIVATE

## 2024-07-12 PROCEDURE — 6370000000 HC RX 637 (ALT 250 FOR IP): Performed by: NURSE PRACTITIONER

## 2024-07-12 PROCEDURE — 6370000000 HC RX 637 (ALT 250 FOR IP): Performed by: STUDENT IN AN ORGANIZED HEALTH CARE EDUCATION/TRAINING PROGRAM

## 2024-07-12 PROCEDURE — 94640 AIRWAY INHALATION TREATMENT: CPT

## 2024-07-12 PROCEDURE — 99232 SBSQ HOSP IP/OBS MODERATE 35: CPT | Performed by: STUDENT IN AN ORGANIZED HEALTH CARE EDUCATION/TRAINING PROGRAM

## 2024-07-12 PROCEDURE — 6360000002 HC RX W HCPCS: Performed by: NURSE PRACTITIONER

## 2024-07-12 PROCEDURE — 2580000003 HC RX 258: Performed by: NURSE PRACTITIONER

## 2024-07-12 PROCEDURE — 36415 COLL VENOUS BLD VENIPUNCTURE: CPT

## 2024-07-12 PROCEDURE — 97530 THERAPEUTIC ACTIVITIES: CPT

## 2024-07-12 PROCEDURE — 97110 THERAPEUTIC EXERCISES: CPT

## 2024-07-12 PROCEDURE — 6370000000 HC RX 637 (ALT 250 FOR IP)

## 2024-07-12 PROCEDURE — 2700000000 HC OXYGEN THERAPY PER DAY

## 2024-07-12 PROCEDURE — 82947 ASSAY GLUCOSE BLOOD QUANT: CPT

## 2024-07-12 PROCEDURE — 94761 N-INVAS EAR/PLS OXIMETRY MLT: CPT

## 2024-07-12 PROCEDURE — 80048 BASIC METABOLIC PNL TOTAL CA: CPT

## 2024-07-12 RX ORDER — INSULIN LISPRO 100 [IU]/ML
0-16 INJECTION, SOLUTION INTRAVENOUS; SUBCUTANEOUS
Status: DISCONTINUED | OUTPATIENT
Start: 2024-07-12 | End: 2024-07-14 | Stop reason: HOSPADM

## 2024-07-12 RX ORDER — INSULIN GLARGINE 100 [IU]/ML
40 INJECTION, SOLUTION SUBCUTANEOUS 2 TIMES DAILY
Status: DISCONTINUED | OUTPATIENT
Start: 2024-07-12 | End: 2024-07-13

## 2024-07-12 RX ORDER — INSULIN LISPRO 100 [IU]/ML
0-4 INJECTION, SOLUTION INTRAVENOUS; SUBCUTANEOUS NIGHTLY
Status: DISCONTINUED | OUTPATIENT
Start: 2024-07-12 | End: 2024-07-14 | Stop reason: HOSPADM

## 2024-07-12 RX ORDER — INSULIN GLARGINE 100 [IU]/ML
45 INJECTION, SOLUTION SUBCUTANEOUS NIGHTLY
Status: DISCONTINUED | OUTPATIENT
Start: 2024-07-12 | End: 2024-07-12

## 2024-07-12 RX ADMIN — OXYCODONE 5 MG: 5 TABLET ORAL at 17:02

## 2024-07-12 RX ADMIN — ENOXAPARIN SODIUM 30 MG: 100 INJECTION SUBCUTANEOUS at 21:13

## 2024-07-12 RX ADMIN — BUDESONIDE AND FORMOTEROL FUMARATE DIHYDRATE 2 PUFF: 80; 4.5 AEROSOL RESPIRATORY (INHALATION) at 21:15

## 2024-07-12 RX ADMIN — POLYETHYLENE GLYCOL 3350 17 G: 17 POWDER, FOR SOLUTION ORAL at 08:14

## 2024-07-12 RX ADMIN — INSULIN GLARGINE 40 UNITS: 100 INJECTION, SOLUTION SUBCUTANEOUS at 21:13

## 2024-07-12 RX ADMIN — INSULIN LISPRO 4 UNITS: 100 INJECTION, SOLUTION INTRAVENOUS; SUBCUTANEOUS at 07:59

## 2024-07-12 RX ADMIN — ACETAMINOPHEN 650 MG: 325 TABLET ORAL at 17:02

## 2024-07-12 RX ADMIN — TOPIRAMATE 100 MG: 100 TABLET, FILM COATED ORAL at 22:25

## 2024-07-12 RX ADMIN — INSULIN LISPRO 8 UNITS: 100 INJECTION, SOLUTION INTRAVENOUS; SUBCUTANEOUS at 17:07

## 2024-07-12 RX ADMIN — PANTOPRAZOLE SODIUM 40 MG: 40 TABLET, DELAYED RELEASE ORAL at 07:23

## 2024-07-12 RX ADMIN — ASPIRIN 81 MG: 81 TABLET, COATED ORAL at 08:00

## 2024-07-12 RX ADMIN — OXYBUTYNIN CHLORIDE 5 MG: 5 TABLET, EXTENDED RELEASE ORAL at 21:13

## 2024-07-12 RX ADMIN — BUDESONIDE AND FORMOTEROL FUMARATE DIHYDRATE 2 PUFF: 80; 4.5 AEROSOL RESPIRATORY (INHALATION) at 09:15

## 2024-07-12 RX ADMIN — ESCITALOPRAM OXALATE 20 MG: 10 TABLET ORAL at 08:00

## 2024-07-12 RX ADMIN — SODIUM CHLORIDE, PRESERVATIVE FREE 10 ML: 5 INJECTION INTRAVENOUS at 21:22

## 2024-07-12 RX ADMIN — Medication 6 MG: at 21:18

## 2024-07-12 RX ADMIN — TICAGRELOR 90 MG: 90 TABLET ORAL at 21:13

## 2024-07-12 RX ADMIN — INSULIN LISPRO 8 UNITS: 100 INJECTION, SOLUTION INTRAVENOUS; SUBCUTANEOUS at 12:59

## 2024-07-12 RX ADMIN — PANTOPRAZOLE SODIUM 40 MG: 40 TABLET, DELAYED RELEASE ORAL at 17:03

## 2024-07-12 RX ADMIN — ISOSORBIDE MONONITRATE 30 MG: 30 TABLET, EXTENDED RELEASE ORAL at 08:04

## 2024-07-12 RX ADMIN — ENOXAPARIN SODIUM 30 MG: 100 INJECTION SUBCUTANEOUS at 08:03

## 2024-07-12 RX ADMIN — AMLODIPINE BESYLATE 2.5 MG: 5 TABLET ORAL at 07:59

## 2024-07-12 RX ADMIN — SODIUM CHLORIDE, PRESERVATIVE FREE 10 ML: 5 INJECTION INTRAVENOUS at 08:01

## 2024-07-12 RX ADMIN — ROSUVASTATIN CALCIUM 40 MG: 20 TABLET, FILM COATED ORAL at 21:13

## 2024-07-12 RX ADMIN — METOPROLOL TARTRATE 25 MG: 50 TABLET, FILM COATED ORAL at 21:24

## 2024-07-12 RX ADMIN — TICAGRELOR 90 MG: 90 TABLET ORAL at 08:01

## 2024-07-12 ASSESSMENT — PAIN - FUNCTIONAL ASSESSMENT: PAIN_FUNCTIONAL_ASSESSMENT: ACTIVITIES ARE NOT PREVENTED

## 2024-07-12 ASSESSMENT — PAIN SCALES - GENERAL
PAINLEVEL_OUTOF10: 4
PAINLEVEL_OUTOF10: 0
PAINLEVEL_OUTOF10: 9

## 2024-07-12 ASSESSMENT — PAIN SCALES - WONG BAKER: WONGBAKER_NUMERICALRESPONSE: NO HURT

## 2024-07-12 ASSESSMENT — PAIN DESCRIPTION - ORIENTATION: ORIENTATION: LOWER;MID

## 2024-07-12 ASSESSMENT — PAIN DESCRIPTION - DESCRIPTORS: DESCRIPTORS: ACHING;DISCOMFORT

## 2024-07-12 ASSESSMENT — PAIN DESCRIPTION - LOCATION: LOCATION: BACK

## 2024-07-12 NOTE — PLAN OF CARE
Problem: Safety - Adult  Goal: Free from fall injury  7/12/2024 0404 by Jenae Flores RN  Outcome: Progressing  7/11/2024 1730 by Yvonne Johns RN  Outcome: Progressing     Problem: Chronic Conditions and Co-morbidities  Goal: Patient's chronic conditions and co-morbidity symptoms are monitored and maintained or improved  7/12/2024 0404 by Jenae Flores RN  Outcome: Progressing  7/11/2024 1730 by Yvonne Johns RN  Outcome: Progressing     Problem: Pain  Goal: Verbalizes/displays adequate comfort level or baseline comfort level  7/12/2024 0404 by Jenae Flores RN  Outcome: Progressing  7/11/2024 1730 by Yvonne Johns RN  Outcome: Progressing     Problem: Skin/Tissue Integrity  Goal: Absence of new skin breakdown  Description: 1.  Monitor for areas of redness and/or skin breakdown  2.  Assess vascular access sites hourly  3.  Every 4-6 hours minimum:  Change oxygen saturation probe site  4.  Every 4-6 hours:  If on nasal continuous positive airway pressure, respiratory therapy assess nares and determine need for appliance change or resting period.  7/12/2024 0404 by Jenae Flores RN  Outcome: Progressing  7/11/2024 1730 by Yvonne Johns RN  Outcome: Progressing     Problem: Respiratory - Adult  Goal: Achieves optimal ventilation and oxygenation  7/12/2024 0404 by Jenae Flores RN  Outcome: Progressing  7/11/2024 1730 by Yvonne Johns RN  Outcome: Progressing

## 2024-07-13 LAB
GLUCOSE BLD-MCNC: 135 MG/DL (ref 65–105)
GLUCOSE BLD-MCNC: 152 MG/DL (ref 65–105)
GLUCOSE BLD-MCNC: 225 MG/DL (ref 65–105)
GLUCOSE BLD-MCNC: 345 MG/DL (ref 65–105)

## 2024-07-13 PROCEDURE — 6370000000 HC RX 637 (ALT 250 FOR IP): Performed by: STUDENT IN AN ORGANIZED HEALTH CARE EDUCATION/TRAINING PROGRAM

## 2024-07-13 PROCEDURE — 97535 SELF CARE MNGMENT TRAINING: CPT

## 2024-07-13 PROCEDURE — 6370000000 HC RX 637 (ALT 250 FOR IP): Performed by: NURSE PRACTITIONER

## 2024-07-13 PROCEDURE — 94660 CPAP INITIATION&MGMT: CPT

## 2024-07-13 PROCEDURE — 94640 AIRWAY INHALATION TREATMENT: CPT

## 2024-07-13 PROCEDURE — 97166 OT EVAL MOD COMPLEX 45 MIN: CPT

## 2024-07-13 PROCEDURE — 6370000000 HC RX 637 (ALT 250 FOR IP)

## 2024-07-13 PROCEDURE — 2580000003 HC RX 258: Performed by: NURSE PRACTITIONER

## 2024-07-13 PROCEDURE — 6360000002 HC RX W HCPCS: Performed by: NURSE PRACTITIONER

## 2024-07-13 PROCEDURE — 94761 N-INVAS EAR/PLS OXIMETRY MLT: CPT

## 2024-07-13 PROCEDURE — 82947 ASSAY GLUCOSE BLOOD QUANT: CPT

## 2024-07-13 PROCEDURE — 1200000000 HC SEMI PRIVATE

## 2024-07-13 PROCEDURE — 99232 SBSQ HOSP IP/OBS MODERATE 35: CPT | Performed by: STUDENT IN AN ORGANIZED HEALTH CARE EDUCATION/TRAINING PROGRAM

## 2024-07-13 PROCEDURE — 2700000000 HC OXYGEN THERAPY PER DAY

## 2024-07-13 PROCEDURE — 97530 THERAPEUTIC ACTIVITIES: CPT

## 2024-07-13 RX ORDER — INSULIN GLARGINE 100 [IU]/ML
45 INJECTION, SOLUTION SUBCUTANEOUS 2 TIMES DAILY
Status: DISCONTINUED | OUTPATIENT
Start: 2024-07-13 | End: 2024-07-14 | Stop reason: HOSPADM

## 2024-07-13 RX ADMIN — ESCITALOPRAM OXALATE 20 MG: 10 TABLET ORAL at 08:31

## 2024-07-13 RX ADMIN — INSULIN LISPRO 4 UNITS: 100 INJECTION, SOLUTION INTRAVENOUS; SUBCUTANEOUS at 08:22

## 2024-07-13 RX ADMIN — TICAGRELOR 90 MG: 90 TABLET ORAL at 21:37

## 2024-07-13 RX ADMIN — METOPROLOL TARTRATE 25 MG: 50 TABLET, FILM COATED ORAL at 21:49

## 2024-07-13 RX ADMIN — ENOXAPARIN SODIUM 30 MG: 100 INJECTION SUBCUTANEOUS at 08:21

## 2024-07-13 RX ADMIN — INSULIN GLARGINE 45 UNITS: 100 INJECTION, SOLUTION SUBCUTANEOUS at 21:36

## 2024-07-13 RX ADMIN — PANTOPRAZOLE SODIUM 40 MG: 40 TABLET, DELAYED RELEASE ORAL at 08:32

## 2024-07-13 RX ADMIN — BUDESONIDE AND FORMOTEROL FUMARATE DIHYDRATE 2 PUFF: 80; 4.5 AEROSOL RESPIRATORY (INHALATION) at 09:13

## 2024-07-13 RX ADMIN — METOPROLOL TARTRATE 25 MG: 50 TABLET, FILM COATED ORAL at 12:39

## 2024-07-13 RX ADMIN — ROSUVASTATIN CALCIUM 40 MG: 20 TABLET, FILM COATED ORAL at 21:37

## 2024-07-13 RX ADMIN — OXYBUTYNIN CHLORIDE 5 MG: 5 TABLET, EXTENDED RELEASE ORAL at 21:37

## 2024-07-13 RX ADMIN — TICAGRELOR 90 MG: 90 TABLET ORAL at 08:32

## 2024-07-13 RX ADMIN — INSULIN GLARGINE 45 UNITS: 100 INJECTION, SOLUTION SUBCUTANEOUS at 08:28

## 2024-07-13 RX ADMIN — BUDESONIDE AND FORMOTEROL FUMARATE DIHYDRATE 2 PUFF: 80; 4.5 AEROSOL RESPIRATORY (INHALATION) at 21:23

## 2024-07-13 RX ADMIN — SODIUM CHLORIDE, PRESERVATIVE FREE 10 ML: 5 INJECTION INTRAVENOUS at 08:21

## 2024-07-13 RX ADMIN — AMLODIPINE BESYLATE 2.5 MG: 5 TABLET ORAL at 08:30

## 2024-07-13 RX ADMIN — PANTOPRAZOLE SODIUM 40 MG: 40 TABLET, DELAYED RELEASE ORAL at 17:49

## 2024-07-13 RX ADMIN — INSULIN LISPRO 12 UNITS: 100 INJECTION, SOLUTION INTRAVENOUS; SUBCUTANEOUS at 12:37

## 2024-07-13 RX ADMIN — ENOXAPARIN SODIUM 30 MG: 100 INJECTION SUBCUTANEOUS at 21:35

## 2024-07-13 RX ADMIN — Medication 6 MG: at 21:37

## 2024-07-13 RX ADMIN — ISOSORBIDE MONONITRATE 30 MG: 30 TABLET, EXTENDED RELEASE ORAL at 12:39

## 2024-07-13 RX ADMIN — ASPIRIN 81 MG: 81 TABLET, COATED ORAL at 08:33

## 2024-07-13 RX ADMIN — SODIUM CHLORIDE, PRESERVATIVE FREE 10 ML: 5 INJECTION INTRAVENOUS at 21:44

## 2024-07-13 RX ADMIN — TOPIRAMATE 100 MG: 100 TABLET, FILM COATED ORAL at 21:37

## 2024-07-13 ASSESSMENT — PAIN SCALES - GENERAL: PAINLEVEL_OUTOF10: 0

## 2024-07-13 ASSESSMENT — PAIN SCALES - WONG BAKER: WONGBAKER_NUMERICALRESPONSE: NO HURT

## 2024-07-13 NOTE — PLAN OF CARE
Problem: Respiratory - Adult  Goal: Achieves optimal ventilation and oxygenation  7/13/2024 0917 by Haleigh Farfan RCP  Flowsheets (Taken 7/13/2024 0917)  Achieves optimal ventilation and oxygenation:   Assess for changes in respiratory status   Respiratory therapy support as indicated   Assess for changes in mentation and behavior   Oxygen supplementation based on oxygen saturation or arterial blood gases   Encourage broncho-pulmonary hygiene including cough, deep breathe, incentive spirometry   Assess and instruct to report shortness of breath or any respiratory difficulty

## 2024-07-13 NOTE — PLAN OF CARE
Problem: Safety - Adult  Goal: Free from fall injury  Outcome: Progressing     Problem: Chronic Conditions and Co-morbidities  Goal: Patient's chronic conditions and co-morbidity symptoms are monitored and maintained or improved  Outcome: Progressing     Problem: Pain  Goal: Verbalizes/displays adequate comfort level or baseline comfort level  Outcome: Progressing     Problem: Skin/Tissue Integrity  Goal: Absence of new skin breakdown  Description: 1.  Monitor for areas of redness and/or skin breakdown  2.  Assess vascular access sites hourly  3.  Every 4-6 hours minimum:  Change oxygen saturation probe site  4.  Every 4-6 hours:  If on nasal continuous positive airway pressure, respiratory therapy assess nares and determine need for appliance change or resting period.  Outcome: Progressing     Problem: Respiratory - Adult  Goal: Achieves optimal ventilation and oxygenation  Outcome: Progressing

## 2024-07-13 NOTE — DISCHARGE SUMMARY
[facility]  Office: [phone]  Goldy Moore DO, Ferdinand Villeda DO, Jose Goff DO, Alejo Mao DO, Mainor Smith MD, Melony Rodriguez MD, Tonia Brenner MD, Alethea Ramsey MD,  Rahul Zheng MD, Josi Saldana MD, Pedro Deras MD,  Lauren Acosta DO, Randee Pantoja MD, Raji Reddy MD, Praveen Moore DO, Veronica Duff MD,  Spencer Ferguson DO, Olivia Boss MD, Veronika Lin MD, Caryl Zacarias MD, Slick Jean MD,  Tacos Yang MD, Guzman Stein MD, Vicente Yee MD, Mickey Avila MD, Conrad Bailey MD, Raisa Felder MD, Travon Blanchard DO, Joel Rome DO, Mariel Francisco MD,  Mika Valentin MD, Shirley Waterhouse, CNP,  Isamar Seals CNP, Jose Warren, CNP,  Jenna Angulo, NAKUL, Jeannette Bobo, CNP, Sofya Rose, CNP, Lara Yan CNP, Michelle García CNP, Tamara Issa, PA-C, Halina Lynch PA-C, Greer Harmon, CNP, Tomasa Trimble, CNP, Anika Medina, CNP, Keri Weaver, CNP, Zoe Coates CNP, Cheyanne White, CNS, Heide Collier, CNP, Amber Mast CNP, Tracy Schwab, CNP         [facility]   IN-PATIENT SERVICE   [facility]    Discharge Summary     Patient ID: Mariangel Abreu  :  1950   MRN: [id number]     ACCOUNT:  [id number]   Patient's PCP: Carla Chua APRN - CNP  Admit Date: 2024   Discharge Date: 2024    Length of Stay: 4  Code Status:  Full Code  Admitting Physician: Raisa Felder MD  Discharge Physician: Raisa Felder MD     Active Discharge Diagnoses:     Hospital Problem Lists:  Principal Problem:    Type 2 diabetes mellitus with hyperosmolar hyperglycemic state (HHS) ([facility])  Active Problems:    COPD (chronic obstructive pulmonary disease)    CAD S/P percutaneous coronary angioplasty    Chest pain of uncertain etiology    Essential hypertension    YAJAIRA (acute kidney injury) ([facility])    Obesity, Class III, BMI 40-49.9 (morbid obesity) ([facility])    CKD (chronic kidney disease) stage 3, GFR 30-59

## 2024-07-14 VITALS
SYSTOLIC BLOOD PRESSURE: 138 MMHG | RESPIRATION RATE: 23 BRPM | WEIGHT: 235.45 LBS | DIASTOLIC BLOOD PRESSURE: 58 MMHG | HEIGHT: 62 IN | BODY MASS INDEX: 43.33 KG/M2 | HEART RATE: 61 BPM | TEMPERATURE: 98.3 F | OXYGEN SATURATION: 94 %

## 2024-07-14 LAB
GLUCOSE BLD-MCNC: 111 MG/DL (ref 65–105)
GLUCOSE BLD-MCNC: 230 MG/DL (ref 65–105)

## 2024-07-14 PROCEDURE — 99239 HOSP IP/OBS DSCHRG MGMT >30: CPT | Performed by: STUDENT IN AN ORGANIZED HEALTH CARE EDUCATION/TRAINING PROGRAM

## 2024-07-14 PROCEDURE — 6370000000 HC RX 637 (ALT 250 FOR IP): Performed by: STUDENT IN AN ORGANIZED HEALTH CARE EDUCATION/TRAINING PROGRAM

## 2024-07-14 PROCEDURE — 94761 N-INVAS EAR/PLS OXIMETRY MLT: CPT

## 2024-07-14 PROCEDURE — 2580000003 HC RX 258: Performed by: NURSE PRACTITIONER

## 2024-07-14 PROCEDURE — 6370000000 HC RX 637 (ALT 250 FOR IP)

## 2024-07-14 PROCEDURE — 82947 ASSAY GLUCOSE BLOOD QUANT: CPT

## 2024-07-14 PROCEDURE — 6360000002 HC RX W HCPCS: Performed by: NURSE PRACTITIONER

## 2024-07-14 PROCEDURE — 6370000000 HC RX 637 (ALT 250 FOR IP): Performed by: NURSE PRACTITIONER

## 2024-07-14 PROCEDURE — 94640 AIRWAY INHALATION TREATMENT: CPT

## 2024-07-14 RX ORDER — ISOSORBIDE MONONITRATE 30 MG/1
30 TABLET, EXTENDED RELEASE ORAL DAILY
Qty: 30 TABLET | Refills: 3 | Status: ON HOLD | OUTPATIENT
Start: 2024-07-15 | End: 2024-10-07

## 2024-07-14 RX ORDER — AMLODIPINE BESYLATE 2.5 MG/1
2.5 TABLET ORAL DAILY
Qty: 30 TABLET | Refills: 3 | Status: SHIPPED | OUTPATIENT
Start: 2024-07-15 | End: 2024-12-06

## 2024-07-14 RX ADMIN — PANTOPRAZOLE SODIUM 40 MG: 40 TABLET, DELAYED RELEASE ORAL at 08:44

## 2024-07-14 RX ADMIN — ISOSORBIDE MONONITRATE 30 MG: 30 TABLET, EXTENDED RELEASE ORAL at 08:44

## 2024-07-14 RX ADMIN — ASPIRIN 81 MG: 81 TABLET, COATED ORAL at 08:44

## 2024-07-14 RX ADMIN — BUDESONIDE AND FORMOTEROL FUMARATE DIHYDRATE 2 PUFF: 80; 4.5 AEROSOL RESPIRATORY (INHALATION) at 08:49

## 2024-07-14 RX ADMIN — AMLODIPINE BESYLATE 2.5 MG: 5 TABLET ORAL at 08:42

## 2024-07-14 RX ADMIN — TICAGRELOR 90 MG: 90 TABLET ORAL at 08:42

## 2024-07-14 RX ADMIN — INSULIN GLARGINE 45 UNITS: 100 INJECTION, SOLUTION SUBCUTANEOUS at 08:45

## 2024-07-14 RX ADMIN — ESCITALOPRAM OXALATE 20 MG: 10 TABLET ORAL at 08:45

## 2024-07-14 RX ADMIN — SODIUM CHLORIDE, PRESERVATIVE FREE 10 ML: 5 INJECTION INTRAVENOUS at 08:45

## 2024-07-14 RX ADMIN — INSULIN LISPRO 4 UNITS: 100 INJECTION, SOLUTION INTRAVENOUS; SUBCUTANEOUS at 14:05

## 2024-07-14 RX ADMIN — ENOXAPARIN SODIUM 30 MG: 100 INJECTION SUBCUTANEOUS at 08:47

## 2024-07-14 NOTE — PROGRESS NOTES
BRONCHOSPASM/BRONCHOCONSTRICTION     [x]         IMPROVE AERATION/BREATH SOUNDS  [x]   ADMINISTER BRONCHODILATOR THERAPY AS APPROPRIATE  [x]   ASSESS BREATH SOUNDS  [x]   IMPLEMENT AEROSOL/MDI PROTOCOL  [x]   PATIENT EDUCATION AS NEEDED    
CLINICAL PHARMACY NOTE: MEDS TO BEDS    Total # of Prescriptions Filled: 2   The following medications were delivered to the patient:  Isosorbide mono  amlodipine    Additional Documentation:   
Diabetes Education Follow up  Unable to see patient - nursing staff state that they anticipate her return to room in approximately 45 minutes.  Will attempt to round again on  patient this afternoon.  Stacey White RN    Addendum at 2pm - patient now back in room. She is willing to connect back with outpatient diabetes education program. Appointment made and added to After Visit Summary. Also, appt card given.  Progress note from yesterday copied below for reference.  Thank you for the referral.  Stacey White RN    Inpatient Diabetes  Education     Type and Reason for Visit: Patient Education   Patient is known to DMED outpatient, long standing history of diabtes.  She has PCP care with Carla Chua APRN - CNP  She expressed she has insulin and BG meter and pills at home, she also reflected that she sometimes does not take all the insulins, she was afraid of over doing it.   She had one or two sessions with [facility] but then had heart issues. She expressed willing to follow up  as outpatient and wants to understand her mediction plan better.  She expressed she is having more sweets - Friday donuts with friends and knows she should cut back.     Provided education folder and made plan to follow up tomorrow to discuss follow up out patient.  Patient expressed now in cardiac rehab and willing to start with DMED in Aug and Friday would be her best day.      Education Folder provided with following support information:   _x__  Handout - What is diabetes? cornerstones4 care 2019  _x__  Handout - Eating Healthy for Life at every Meal / Plate method and grocery list  from www.[facility]abetes[facility] [email or web address]  _x__ Handout - How to Check Your Blood Sugar from www.[facility]abetes[facility] [place].org  _x__ Handout - Be safe with Needles teaching sheet - / www.[facility]abetes[facility] [email or web address].org    _x__ Handout - How to Use an Insulin Pen - handout with QR code - [employer] wise Current as of Sept 22 2021  _x__ Emergency Insert sheet for your Vehicle - ADA 
Inpatient Diabetes  Education     Type and Reason for Visit: Patient Education   Patient is known to DMED outpatient, long standing history of diabtes.  She has PCP care with Carla Chua APRN - CNP  She expressed she has insulin and BG meter and pills at home, she also reflected that she sometimes does not take all the insulins, she was afraid of over doing it.   She had one or two sessions with DMED but then had heart issues. She expressed willing to follow up  as outpatient and wants to understand her mediction plan better.  She expressed she is having more sweets - Friday donuts with friends and knows she should cut back.     Provided education folder and made plan to follow up tomorrow to discuss follow up out patient.  Patient expressed now in cardiac rehab and willing to start with DMED in Aug and Friday would be her best day.      Education Folder provided with following support information:   _x__  Handout - What is [facility]? [facility]  _x__  Handout - Eating Healthy for Life at every Meal / Plate method and grocery list  from www.[facility]abetes[facility] [email or web address]  _x__ Handout - How to Check Your Blood Sugar from www.[facility]abetes[facility] [place].org  _x__ Handout - Be safe with Needles teaching sheet - / www.[facility]abetes[facility] [place].org    _x__ Handout - How to Use an Insulin Pen - handout with QR code - Health wise Current as of Sept 22 2021  _x__ Emergency Insert sheet for your Vehicle - ADA Diabetes Emergencies   _x__ Diabetes ID card - ADA Low and High Blood Sugar and treatments  _x__ [facility] Education brochure/ contact card    EBENEZER RDZ RN  [facility]  
Occupational Therapy  Facility/Department: [address] BURN UNIT  Occupational Therapy Initial Assessment    Name: Mariangel Abreu  : 1950  MRN: [id number]  Date of Service: 2024    Discharge Recommendations:  Patient would benefit from continued therapy after discharge  OT Equipment Recommendations  Equipment Needed: No       Patient Diagnosis(es): The primary encounter diagnosis was Hyperglycemia. Diagnoses of Tenderness of left calf, Chest pain, unspecified type, and Angina pectoris ([facility]) were also pertinent to this visit.  Past Medical History:  has a past medical history of Abdominal bloating, Adenomatous polyp of ascending colon, Allergic rhinitis, Anxiety, Arthritis, Asthma, Atrial fibrillation ([facility]), Back pain, Benign hypertension with CKD (chronic kidney disease) stage III ([facility]), CAD (coronary artery disease), Caffeine use, CHF (congestive heart failure) ([facility]), Chronic kidney disease, CKD (chronic kidney disease) stage 3, GFR 30-59 ml/min ([facility]), Claudication ([facility]), COPD (chronic obstructive pulmonary disease) ([facility]), Degeneration of lumbar or lumbosacral intervertebral disc, Depression, DM (diabetes mellitus) ([facility]), Emphysema of lung ([facility]), Gastritis, GERD (gastroesophageal reflux disease), Hearing loss, Hematuria, Hiatal hernia, History of loop recorder, HTN (hypertension), benign, Hypertriglyceridemia, Incontinence of urine, Irritable bowel syndrome with both constipation and diarrhea, Kidney stone, Knee arthropathy, Long term (current) use of anticoagulants, Lumbosacral spondylosis without myelopathy, Migraine headache, Mumps, Neuropathy, Obesity, On home oxygen therapy, HIGINIO on CPAP, Otitis media, Secondary diabetes mellitus with stage 3 chronic kidney disease (GFR 30-59) ([facility]), STEMI (ST elevation myocardial infarction) ([facility]), Stroke ([facility]), Tinnitus, Type 2 diabetes mellitus without complication (HCC), Type II or unspecified type diabetes mellitus without mention of complication, not stated as 
Physical Therapy        Physical Therapy Cancel Note      DATE: 7/10/2024    NAME: Mariangel Abreu  MRN: [id number]   : 1950      Patient not seen this date for Physical Therapy due to:    Patient is not appropriate for PT evaluation/treatment at this time d/t stress test. PT will check back as time allows      Electronically signed by Kandace Kemp PT on 7/10/2024 at 8:24 AM      
Physical Therapy  Facility/Department: [address] BURN UNIT  Physical Therapy Initial Assessment    Name: Mariangel Abreu  : 1950  MRN: [id number]  Date of Service: 2024  Chief Complaint   Patient presents with    Chest Pain      Discharge Recommendations:  Patient would benefit from continued therapy after discharge   PT Equipment Recommendations  Equipment Needed: No (CTA)      Patient Diagnosis(es): The primary encounter diagnosis was Hyperglycemia. Diagnoses of Tenderness of left calf, Chest pain, unspecified type, and Angina pectoris (HCC) were also pertinent to this visit.  Past Medical History:  has a past medical history of Abdominal bloating, Adenomatous polyp of ascending colon, Allergic rhinitis, Anxiety, Arthritis, Asthma, Atrial fibrillation ([facility]), Back pain, Benign hypertension with CKD (chronic kidney disease) stage III ([facility]), CAD (coronary artery disease), Caffeine use, CHF (congestive heart failure) ([facility]), Chronic kidney disease, CKD (chronic kidney disease) stage 3, GFR 30-59 ml/min ([facility]), Claudication ([facility]), COPD (chronic obstructive pulmonary disease) ([facility]), Degeneration of lumbar or lumbosacral intervertebral disc, Depression, DM (diabetes mellitus) ([facility]), Emphysema of lung ([facility]), Gastritis, GERD (gastroesophageal reflux disease), Hearing loss, Hematuria, Hiatal hernia, History of loop recorder, HTN (hypertension), benign, Hypertriglyceridemia, Incontinence of urine, Irritable bowel syndrome with both constipation and diarrhea, Kidney stone, Knee arthropathy, Long term (current) use of anticoagulants, Lumbosacral spondylosis without myelopathy, Migraine headache, Mumps, Neuropathy, Obesity, On home oxygen therapy, HIGINIO on CPAP, Otitis media, Secondary diabetes mellitus with stage 3 chronic kidney disease (GFR 30-59) ([facility]), STEMI (ST elevation myocardial infarction) ([facility]), Stroke ([facility]), Tinnitus, Type 2 diabetes mellitus without complication ([facility]), Type II or unspecified type diabetes 
[facility]   Progress Note                   Date:   7/10/2024  Patient name: Mariangel Abreu  Date of admission:  7/8/2024  9:08 PM  MRN:   [id number]  YOB: 1950  PCP: Carla Chua, APRN - CNP    Reason for Admission: Angina pectoris (HCC) [I20.9]  Hyperglycemia [R73.9]  Tenderness of left calf [M79.662]  Chest pain, unspecified type [R07.9]    Subjective:       Clinical Changes / Abnormalities: Pt seen and examined in the stress lab.  Patient resting in bed. Pt denies any CP or sob.  Labs, vitals and tele reviewed- SB    Medications:   Scheduled Meds:   aspirin  81 mg Oral Daily    escitalopram  20 mg Oral Daily    budesonide-formoterol  2 puff Inhalation BID RT    isosorbide dinitrate  30 mg Oral Daily    [Held by provider] metFORMIN  500 mg Oral Daily with breakfast    metoprolol tartrate  25 mg Oral BID    [Held by provider] midodrine  2.5 mg Oral TID WC    oxyBUTYnin  5 mg Oral Nightly    pantoprazole  40 mg Oral BID AC    rosuvastatin  40 mg Oral Nightly    ticagrelor  90 mg Oral BID    topiramate  100 mg Oral Daily    sodium chloride flush  5-40 mL IntraVENous 2 times per day    enoxaparin  30 mg SubCUTAneous BID    sodium chloride  15 mL/kg IntraVENous Once    [Held by provider] methylPREDNISolone  40 mg IntraVENous Q4H     Continuous Infusions:   sodium chloride      dextrose      sodium chloride Stopped (07/09/24 2306)    insulin 2.7 Units/hr (07/10/24 1002)    dextrose 5 % and 0.45 % NaCl 150 mL/hr at 07/10/24 0601    sodium chloride       CBC:   Recent Labs     07/08/24  2141   WBC 8.2   HGB 13.7        BMP:    Recent Labs     07/09/24  2133 07/10/24  0245 07/10/24  1022    138 140   K 4.0 3.9 3.8    107 108*   CO2 24 23 24   BUN 24* 21 17   CREATININE 1.0* 0.9 0.9   GLUCOSE 240* 166* 135*     Hepatic: No results for input(s): \"AST\", \"ALT\", \"BILITOT\", \"ALKPHOS\" in the last 72 hours.    Invalid input(s): \"ALB\"  Troponin:   Recent Labs     
[facility]   Progress Note                   Date:   7/11/2024  Patient name: Mariangel Abreu  Date of admission:  7/8/2024  9:08 PM  MRN:   [id number]  YOB: 1950  PCP: Carla Chua, APRN - CNP    Reason for Admission: Angina pectoris (HCC) [I20.9]  Hyperglycemia [R73.9]  Tenderness of left calf [M79.662]  Chest pain, unspecified type [R07.9]  Chest pain [R07.9]    Subjective:       Clinical Changes / Abnormalities: Pt seen and examined in the stress lab.  Patient resting in bed. Pt denies any CP or sob.  Labs, vitals and tele reviewed- SB. No acute CV events overnight.     Medications:   Scheduled Meds:   insulin glargine  40 Units SubCUTAneous Nightly    insulin lispro  0-8 Units SubCUTAneous TID WC    insulin lispro  0-4 Units SubCUTAneous Nightly    aspirin  81 mg Oral Daily    escitalopram  20 mg Oral Daily    budesonide-formoterol  2 puff Inhalation BID RT    isosorbide dinitrate  30 mg Oral Daily    [Held by provider] metFORMIN  500 mg Oral Daily with breakfast    metoprolol tartrate  25 mg Oral BID    [Held by provider] midodrine  2.5 mg Oral TID WC    oxyBUTYnin  5 mg Oral Nightly    pantoprazole  40 mg Oral BID AC    rosuvastatin  40 mg Oral Nightly    ticagrelor  90 mg Oral BID    topiramate  100 mg Oral Daily    sodium chloride flush  5-40 mL IntraVENous 2 times per day    enoxaparin  30 mg SubCUTAneous BID    sodium chloride  15 mL/kg IntraVENous Once    [Held by provider] methylPREDNISolone  40 mg IntraVENous Q4H     Continuous Infusions:   sodium chloride      dextrose      sodium chloride Stopped (07/09/24 2306)    dextrose 5 % and 0.45 % NaCl Stopped (07/10/24 1418)    sodium chloride       CBC:   Recent Labs     07/08/24  2141   WBC 8.2   HGB 13.7          BMP:    Recent Labs     07/10/24  2349 07/11/24  0301 07/11/24  0302   * 138 136   K 3.7 3.7 3.7    106 106   CO2 24 22 20   BUN 18 18 18   CREATININE 1.2* 1.1* 1.1*   GLUCOSE 240* 175* 
[facility]  Office: [phone]  Goldy Moore DO, Ferdinand Villeda, DO, Jose Goff DO, Alejo Mao, DO, Mainor Smith MD, Melony Rodriguez MD, Tonia Brenner MD, Alethea Ramsey MD,  Rahul Zheng MD, Josi Saldana MD, Pedro Deras MD,  Lauren Acosta DO, Randee Pantoja MD, Raji Reddy MD, Praveen Moore DO, Veronica Duff MD,  Spencer Ferguson DO, Olivia Boss MD, Veronika Lin MD, Caryl Zacarias MD, Slick Jean MD,  Tacos Yang MD, Guzman Stein MD, Vicente Yee MD, Mickey Avila MD, Conrad Bailey MD, Raisa Felder MD, Travon Blanchard DO, Joel Rome DO, Mariel Francisco MD,  Mika Valentin MD, Shirley Waterhouse, CNP,  Isamar Seals CNP, Jose Warren, CNP,  Jenna Angulo, DNP, Jeannette Bobo, CNP, Sofya Rose, CNP, Lara Yan, CNP, Michelle García, CNP, Tamara Issa, PA-C, Halina Lynch PA-C, Greer Harmon, CNP, Tomasa Trimble, CNP, Anika Medina, CNP, Keri Weaver, CNP, Zoe Coates, CNP, Cheyanne White, CNS, Heide Collier, CNP, Amber Mast CNP, Tracy Schwab, CNP         [facility]   IN-PATIENT SERVICE   [facility]    Progress Note    7/13/2024    10:05 AM    Name:   Mariangel Abreu  MRN:     [id number]     Acct:      [id number]   Room:   0165/0165-01  IP Day:  3  Admit Date:  7/8/2024  9:08 PM    PCP:   Carla Chua APRN - CNP  Code Status:  Full Code    Subjective:     C/C:   Chief Complaint   Patient presents with    Chest Pain     Interval History Status: not changed.     Patient seen and examined at bedside this morning. No acute events overnight. Patient had some CP overnight. Currently at baseline. Would like to work more with PT/OT today. Will plan for discharge tomorrow am     Brief History:     73-year-old female with past medical history of diabetes, asthma, A-fib, chronic kidney disease, history of coronary artery disease with 4 stents placement , recent MI on February-24 who presented to 
SP[facility]UAL [facility] - [facility] [place]  PROGRESS NOTE    Shift date: 7/08/2024  Shift day: Monday   Shift # 3    Room # 23/23 Name: Mariangel Abreu \"Ginger\"               Yarsanism: Gnosticist   Place of Rastafarian: Unknown    Referral: Routine Visit    Admit Date & Time: 7/8/2024  9:08 PM    Assessment:  Mariangel Abreu is a 73 y.o. female in the hospital because of \"Hyperglycemia.\" Upon entering the room writer observes patient resting in hospital bed.     Intervention:   visited patient per initial rounding visits in the ED. Patient woke upon  knocking on door and entering room. Writer introduced self and title as . Patient shared that she is \"feeling okay\" tonight. Patient shared that she has wonderful support in her neighbors, as she has lived in the same building for over 20 years. Patient has lost both of her sons, one most recently in March. Patient expressed feelings of grief and sadness over his loss. Patient has several grandchildren and great grandchildren, who are a good support to her. Patient shared life review, speaking of her childhood and her family.  offered prayer at bedside and provided hospitality to patient.     Outcome:  Patient thanked  for visit and care.    Plan:  Chaplains will remain available to offer spiritual and emotional support as needed.       07/09/24 0417   Encounter Summary   Encounter Overview/Reason Initial Encounter   Service Provided For Patient   Referral/Consult From Rounding  (ED Rounding)   Support System Children;Family members;Friends/neighbors   Last Encounter  07/08/24   Complexity of Encounter Moderate   Begin Time 0417   End Time  0515   Total Time Calculated 58 min   Spiritual/Emotional needs   Type Spiritual Support   Assessment/Intervention/Outcome   Assessment Calm;Powerlessness;Sad  (Grieiving)   Intervention Active listening;Discussed belief system/Advent practices/kia;Discussed illness injury and it’s impact;Explored/Affirmed 
Writer called the lab to get labs drawn on this patient. Unique with phlebotomy stated they are in morning huddle and it would be about a hour before anyone could be down to draw labs.   
Mobility - Inpatient   How much help is needed turning from your back to your side while in a flat bed without using bedrails?: None  How much help is needed moving from lying on your back to sitting on the side of a flat bed without using bedrails?: A Little  How much help is needed moving to and from a bed to a chair?: A Little  How much help is needed standing up from a chair using your arms?: A Little  How much help is needed walking in hospital room?: A Little  How much help is needed climbing 3-5 steps with a railing?: Total  AM-PAC Inpatient Mobility Raw Score : 17  AM-PAC Inpatient T-Scale Score : 42.13  Mobility Inpatient CMS 0-100% Score: 50.57  Mobility Inpatient CMS G-Code Modifier : CK    Goals  Short Term Goals  Time Frame for Short Term Goals: 14 visits  Short Term Goal 1: Pt will perform sit<>stand transfer with supervision.  Short Term Goal 2: Pt will ambulate 100 feet with least restrictive AD and SBA.  Short Term Goal 3: Pt will demonstrate good- dynamic standing balance to decrease fall risk.  Short Term Goal 4: Pt will tolerate a 35 minute therapy session to promote increased endurance.  Short Term Goal 5: Pt will perform supine<>sit transfer with supervision       Education  Patient Education  Education Given To: Patient  Education Provided: Role of Therapy;Plan of Care;Transfer Training;Fall Prevention Strategies;Energy Conservation  Education Method: Verbal;Demonstration  Barriers to Learning: None  Education Outcome: Verbalized understanding      Therapy Time   Individual Concurrent Group Co-treatment   Time In 0848         Time Out 0920         Minutes 32         Timed Code Treatment Minutes: 25 Minutes       Kandace Kemp, PT         
migrainosus, not intractable     Morbid (severe) obesity with alveolar hypoventilation (HCC)     Chronic nausea     Lung nodule     Neuropathy     Obstructive sleep apnea syndrome     Asthma with COPD (HCC)     Obesity, Class III, BMI 40-49.9 (morbid obesity) (HCC)     Stage 3 chronic kidney disease (HCC)     Benign hypertension with CKD (chronic kidney disease) stage III (HCC)     Secondary diabetes mellitus with stage 3 chronic kidney disease (GFR 30-59) (HCC)     CKD (chronic kidney disease) stage 3, GFR 30-59 ml/min     Lumbar radiculopathy, chronic     Sessile colonic polyp     Morbid obesity (HCC)     Adenomatous polyp of ascending colon     Irritable bowel syndrome with both constipation and diarrhea     Abdominal bloating     Major depressive disorder, single episode, unspecified     Permanent atrial fibrillation (HCC)     Atypical chest pain     Left ventricular diastolic dysfunction     Transaminasemia     Acute respiratory failure with hypoxia and hypercarbia (HCC)     Macrocytosis     Morbid obesity with BMI of 40.0-44.9, adult (HCC)     Chronic diastolic congestive heart failure (HCC)     Oxygen dependent     Chronic bilateral low back pain with bilateral sciatica     Left lower lobe pneumonia     Prolonged Q-T interval on ECG     Chronic respiratory failure with hypoxia (HCC)     Pneumonia     Chest pain of uncertain etiology     Leg cramping     Leg cramp     Occlusion of left posterior cerebral artery     RUE numbness     Chronic use of opiate for therapeutic purpose     Chronic vulvitis     Angina pectoris (HCC)     COPD exacerbation (HCC)     Type 2 diabetes mellitus with hyperosmolar nonketotic hyperglycemia (HCC)     Leakage of aortic graft (HCC)     Uncontrolled type 2 diabetes mellitus with hyperglycemia (HCC)     Chest pressure     Microhematuria     Right kidney stone     Renal cyst, left     Nocturia     Dental infection     Acute kidney failure, unspecified (HCC)     Allergic rhinitis, 
NOT REPORTED 10/06/2021 02:02 PM    PBEA 3 10/06/2021 02:02 PM    [id number] NOT REPORTED 10/06/2021 02:02 PM    [id number] 98 10/06/2021 02:02 PM    B2ZIBDFZ 89.7 11/21/2011 08:35 AM    FIO2 INFORMATION NOT PROVIDED 02/03/2024 10:47 AM     Lab Results   Component Value Date/Time    SPECIAL L AC 09/05/2023 05:13 PM     Lab Results   Component Value Date/Time    CULTURE Janneth albicans/dubliniensis 50 ,000 CFU/ML (A) 01/05/2024 06:21 PM       Radiology:  CT CHEST PULMONARY EMBOLISM W CONTRAST    Addendum Date: 7/9/2024    ADDENDUM: The main pulmonary artery is dilated measuring 3.7 cm diameter.  This can be seen in the setting of pulmonary artery hypertension.     Result Date: 7/9/2024  1. No evidence of pulmonary embolism or acute pulmonary abnormality. 2. Small hiatal hernia.     XR CHEST PORTABLE    Result Date: 7/8/2024  Hazy opacities in the lower lungs bilaterally which is similar to appearance from prior study and may reflect chronic interstitial changes or atelectasis.       Physical Examination:        Physical Exam  Vitals and nursing note reviewed.   Constitutional:       Appearance: Normal appearance. She is obese.   HENT:      Head: Normocephalic and atraumatic.   Eyes:      Extraocular Movements: Extraocular movements intact.      Conjunctiva/sclera: Conjunctivae normal.      Pupils: Pupils are equal, round, and reactive to light.   Cardiovascular:      Rate and Rhythm: Normal rate and regular rhythm.      Heart sounds: No murmur heard.  Pulmonary:      Effort: Pulmonary effort is normal.      Breath sounds: No wheezing or rales.   Musculoskeletal:      Right lower leg: No edema.      Left lower leg: No edema.   Neurological:      General: No focal deficit present.      Mental Status: She is alert and oriented to person, place, and time.         Assessment:        Hospital Problems             Last Modified POA    * (Principal) Type 2 diabetes mellitus with hyperosmolar hyperglycemic state (HHS) ([facility]) 
improved.    Olivia Boss MD  7/11/2024  10:45 AM    
unspecified 7/10/2024 Yes    Chest pain 7/10/2024 Yes       Plan:        Uncontrolled insulin-dependent diabetes with hyperglycemia  Off insulin gtt   Continue Lantus 40 units nightly and humalog with meals  Adjust doses as needed  Diabetic diet   Diabetic education to be done in the hospital  Start Jardiance on discharge    Chest pain with history of CAD  Stress test done and abnormal  Plan is for medical management  Unable to stent Small PRL branch  Continue on imdur.   Continue aspirin and Brilinta and statins    COPD  Stable  Continue home inhalers    YAJAIRA  Creatinine was 1.2 on admission  Today's 1.0    Right lung nodule-8 mm nodule noted on CT  It has been stable per radiology and not requiring follow-up    Depression/anxiety  Continue home medications    DVT prophylaxis on Lovenox    Raisa Felder MD  7/12/2024  10:06 AM

## 2024-07-14 NOTE — PLAN OF CARE
Problem: Safety - Adult  Goal: Free from fall injury  Outcome: Progressing     Problem: Chronic Conditions and Co-morbidities  Goal: Patient's chronic conditions and co-morbidity symptoms are monitored and maintained or improved  Outcome: Progressing     Problem: Pain  Goal: Verbalizes/displays adequate comfort level or baseline comfort level  Outcome: Progressing  Flowsheets (Taken 7/13/2024 4410 by Sruthi Taylor, RN)  Verbalizes/displays adequate comfort level or baseline comfort level: Encourage patient to monitor pain and request assistance     Problem: Skin/Tissue Integrity  Goal: Absence of new skin breakdown  Description: 1.  Monitor for areas of redness and/or skin breakdown  2.  Assess vascular access sites hourly  3.  Every 4-6 hours minimum:  Change oxygen saturation probe site  4.  Every 4-6 hours:  If on nasal continuous positive airway pressure, respiratory therapy assess nares and determine need for appliance change or resting period.  Outcome: Progressing     Problem: Respiratory - Adult  Goal: Achieves optimal ventilation and oxygenation  7/14/2024 0611 by Jenae Flores, RN  Outcome: Progressing  7/14/2024 0054 by Kanika Bailey RCP  Outcome: Progressing

## 2024-07-15 ENCOUNTER — CARE COORDINATION (OUTPATIENT)
Dept: CARE COORDINATION | Age: 74
End: 2024-07-15

## 2024-07-15 NOTE — CARE COORDINATION
Care Transitions Note    Initial Call - Call within 2 business days of discharge: Yes    Attempted to reach patient for transitions of care follow up. Unable to reach patient.    Outreach Attempts:   HIPAA compliant voicemail left for patient, family, .     Patient: Mariangel Abreu    Patient : 1950   MRN: [id number]    Reason for Admission: DM2 with hyperosmolar hyperglycemic state  Discharge Date: 24  RURS: Readmission Risk Score: 21.3    Last Discharge Facility       Date Complaint Diagnosis Description Type Department Provider    24 Chest Pain Hyperglycemia ... ED to Hosp-Admission (Discharged) (ADMITTED) [facility] Raisa Felder MD; Praveen Adan...            Was this an external facility discharge? No    Follow Up Appointment:   Patient has hospital follow up appointment scheduled  Messaged PCP office     Future Appointments         Provider Specialty Dept Phone    2024 8:00 AM [facility] Cardiac Rehabilitation [phone]    2024 8:00 AM [facility] Card[facility] Rehabilitation [phone]    2024 8:00 AM [facility] Cardiac Rehabilitation [phone]    2024 1:30 PM Vivian Owusu RN Diabetes Services [phone]    2024 8:00 AM [facility] Cardiac Rehabilitation [phone]    2024 8:00 AM [facility] Cardiac Rehabilitation [phone]    2024 8:00 AM [facility] Cardiac Rehabilitation [phone]    2024 8:00 AM [facility] Cardiac Rehabilitation [phone]    2024 9:15 AM Salvador Galarza MD Cardiology [phone]    2024 8:00 AM [facility] Cardiac Rehabilitation [phone]    2024 8:00 AM [facility] Cardiac Rehabilitation [phone]    2024 8:00 AM [facility] Cardiac Rehabilitation [phone]    2024 8:00 AM [facility] Cardiac Rehabilitation [phone]    2024 8:00 AM [facility]   Cardiac

## 2024-07-16 ENCOUNTER — CARE COORDINATION (OUTPATIENT)
Dept: CARE COORDINATION | Age: 74
End: 2024-07-16

## 2024-07-16 NOTE — CARE COORDINATION
Care Transitions Note    Initial Call - Call within 2 business days of discharge: Yes    Attempted to reach patient for transitions of care follow up. Unable to reach patient.    Outreach Attempts:   Multiple attempts to contact patient, family, daughter and son at phone numbers on file.     Patient: Mariangel Abreu    Patient : 1950   MRN: [id number]    Reason for Admission: DM2 with hyperosmolar and hyperglycemic state  Discharge Date: 24  RURS: Readmission Risk Score: 21.3    Last Discharge Facility       Date Complaint Diagnosis Description Type Department Provider    24 Chest Pain Hyperglycemia ... ED to Hosp-Admission (Discharged) (ADMITTED) [facility] Raisa Felder MD; Praveen Adan...            Was this an external facility discharge? No      Future Appointments         Provider Specialty Dept Phone    2024 8:00 AM [facility] Cardiac Rehabilitation [phone]    2024 8:00 AM [facility] Cardiac Rehabilitation [phone]    2024 1:30 PM Vivian Owusu RN Diabetes Services [phone]    2024 8:00 AM [facility] Cardiac Rehabilitation [phone]    2024 8:00 AM [facility] Cardiac Rehabilitation [phone]    2024 8:00 AM [facility] Cardiac Rehabilitation [phone]    2024 8:00 AM [facility] Cardiac Rehabilitation [phone]    2024 9:15 AM Salvador Galarza MD Cardiology [phone]    2024 8:00 AM [facility] Cardiac Rehabilitation [phone]    2024 8:00 AM [facility] Cardiac Rehabilitation [phone]    2024 8:00 AM [facility] Cardiac Rehabilitation [phone]    2024 8:00 AM [facility] Cardiac Rehabilitation [phone]    2024 8:00 AM [facility] Cardiac Rehabilitation [phone]    2024 8:00 AM [facility] Cardiac Rehabilitation [phone]    2024 8:00 AM [facility]

## 2024-07-17 ENCOUNTER — APPOINTMENT (OUTPATIENT)
Dept: CARDIAC REHAB | Age: 74
End: 2024-07-17
Payer: MEDICARE

## 2024-07-19 ENCOUNTER — HOSPITAL ENCOUNTER (OUTPATIENT)
Dept: PAIN MANAGEMENT | Age: 74
Discharge: HOME | End: 2024-07-19
Payer: MEDICARE

## 2024-07-19 VITALS — HEIGHT: 62 IN | WEIGHT: 235 LBS | BODY MASS INDEX: 43.24 KG/M2

## 2024-07-19 DIAGNOSIS — Z79.891 CHRONIC USE OF OPIATE FOR THERAPEUTIC PURPOSE: ICD-10-CM

## 2024-07-19 DIAGNOSIS — M54.42 CHRONIC BILATERAL LOW BACK PAIN WITH BILATERAL SCIATICA: Chronic | ICD-10-CM

## 2024-07-19 DIAGNOSIS — M54.16 LUMBAR RADICULOPATHY, CHRONIC: ICD-10-CM

## 2024-07-19 DIAGNOSIS — M50.30 DDD (DEGENERATIVE DISC DISEASE), CERVICAL: Chronic | ICD-10-CM

## 2024-07-19 DIAGNOSIS — M51.36 DDD (DEGENERATIVE DISC DISEASE), LUMBAR: ICD-10-CM

## 2024-07-19 DIAGNOSIS — M47.817 LUMBOSACRAL SPONDYLOSIS WITHOUT MYELOPATHY: Chronic | ICD-10-CM

## 2024-07-19 DIAGNOSIS — M54.41 CHRONIC BILATERAL LOW BACK PAIN WITH BILATERAL SCIATICA: Chronic | ICD-10-CM

## 2024-07-19 DIAGNOSIS — G89.29 CHRONIC BILATERAL LOW BACK PAIN WITH BILATERAL SCIATICA: Chronic | ICD-10-CM

## 2024-07-19 DIAGNOSIS — M46.1 SACROILIITIS, NOT ELSEWHERE CLASSIFIED (HCC): Primary | Chronic | ICD-10-CM

## 2024-07-19 PROCEDURE — 99213 OFFICE O/P EST LOW 20 MIN: CPT

## 2024-07-19 PROCEDURE — 99213 OFFICE O/P EST LOW 20 MIN: CPT | Performed by: NURSE PRACTITIONER

## 2024-07-19 RX ORDER — OXYCODONE HYDROCHLORIDE AND ACETAMINOPHEN 5; 325 MG/1; MG/1
1 TABLET ORAL EVERY 8 HOURS PRN
Qty: 90 TABLET | Refills: 0 | Status: SHIPPED | OUTPATIENT
Start: 2024-07-19 | End: 2024-08-18

## 2024-07-19 ASSESSMENT — ENCOUNTER SYMPTOMS
COUGH: 0
BACK PAIN: 1
CONSTIPATION: 0
SHORTNESS OF BREATH: 0

## 2024-07-22 ENCOUNTER — HOSPITAL ENCOUNTER (OUTPATIENT)
Dept: CARDIAC REHAB | Age: 74
Setting detail: THERAPIES SERIES
Discharge: HOME | End: 2024-07-22
Payer: MEDICARE

## 2024-07-22 VITALS — WEIGHT: 226 LBS | BODY MASS INDEX: 41.34 KG/M2

## 2024-07-22 PROCEDURE — 93798 PHYS/QHP OP CAR RHAB W/ECG: CPT

## 2024-07-24 ENCOUNTER — HOSPITAL ENCOUNTER (OUTPATIENT)
Dept: CARDIAC REHAB | Age: 74
Setting detail: THERAPIES SERIES
Discharge: HOME | End: 2024-07-24
Payer: MEDICARE

## 2024-07-24 VITALS — WEIGHT: 224 LBS | BODY MASS INDEX: 40.97 KG/M2

## 2024-07-24 PROCEDURE — 93798 PHYS/QHP OP CAR RHAB W/ECG: CPT

## 2024-07-24 NOTE — PROGRESS NOTES
07/24/24 0837   Treatment Diagnosis   Treatment Diagnosis 1 PCI   PCI Date 02/01/24   Treatment Diagnosis 2 STEMI   STEMI Date 02/01/24   Referral Date 02/02/24   Significant Cardiovascular History Chronic atrial fibrillation;History of heart failure  (HLD, HTN, PCI 3/11/24, LOOP RECORDER)   Co-morbidities Cerebrovascular disease;Diabetes;Pulmonary disease;Renal disease  (ANXIETY, ASTHMA, CK-STAGE 3, COPD, DEPRESSION, EMPHYSEMA, MIGRAINES, OBESITY, HOME 02 AS NEEDED-2L/NC, ASA-CPAP AT NIGHT CVA-;MINI, [place])   Oxygen Saturation / Titration    Stages of Change  Maintenance   Oxygen Intervention   Oxygen Use Yes  (PRN)   Oxygen Type  Nasal canula   Patients Liter Flow  2L   O2 Sat Greater Than 90% Yes  (90% ON RA)   Nurse/Patient Discussion  YES   Oxygen Education  Oxygen Safety / [place];Traveling with Oxygen;Types of Oxygen;Proper care of Oxygen Equipment;Emergency Oxygen useage   Oxygen Target Goals  Understand use of oxygen;Discontinue oxygen useage;Decrease liters required;Use oxygen as needed;Keep SA02 greater than 90%   Individual Treatment Plan   ITP Visit Type Re-assessment   1st Date of Exercise  04/25/24   ITP Next Review Date 08/21/24   Visit #/Total Visits 17/36   EF% 78 %  (STRESS TEST 7/10/2024)   Risk Stratification Low   ITP Exercise;Psychosocial;Tobacco;Nutrition;Education   Exercise    Stages of Change Action   Assisted Devices Walker;Wheelchair   Exercise Prescription   Mode Treadmill;Bike;Stepper;Ergometer   Frequency per week 2   Duration Per Session 46 MIN.   Intensity METS       2.8   RPE 13   Progression MINUTES INCREASED EVERY 3 DAYS, INTENSITY AS RPE ALLOWS   Resistance Training Yes   Exercise Blood Pressures   Resting /68   Peak /78   Is BP WDL Yes   Exercise Activity at Home   Type DOING EXERCISES FROM HOME PT;  ARM AND LEGS  (PT STATES SHE WALKS THE HALLS FOR 20 MIN, RESTS FOR A FEW MINUTES, AND REPEATS)   Frequency DAILY   Duration 20 X 2 MINUTES   Resistance Training Yes

## 2024-07-25 ENCOUNTER — HOSPITAL ENCOUNTER (OUTPATIENT)
Dept: DIABETES SERVICES | Age: 74
Setting detail: THERAPIES SERIES
Discharge: HOME | End: 2024-07-25

## 2024-07-26 ENCOUNTER — HOSPITAL ENCOUNTER (OUTPATIENT)
Dept: GENERAL RADIOLOGY | Age: 74
End: 2024-07-26
Payer: MEDICARE

## 2024-07-26 ENCOUNTER — HOSPITAL ENCOUNTER (OUTPATIENT)
Age: 74
End: 2024-07-26
Payer: MEDICARE

## 2024-07-26 ENCOUNTER — OFFICE VISIT (OUTPATIENT)
Dept: PRIMARY CARE CLINIC | Age: 74
End: 2024-07-26

## 2024-07-26 VITALS
DIASTOLIC BLOOD PRESSURE: 93 MMHG | SYSTOLIC BLOOD PRESSURE: 140 MMHG | BODY MASS INDEX: 40.97 KG/M2 | WEIGHT: 224 LBS | OXYGEN SATURATION: 93 % | HEART RATE: 75 BPM

## 2024-07-26 DIAGNOSIS — I25.10 CORONARY ARTERY DISEASE DUE TO LIPID RICH PLAQUE: ICD-10-CM

## 2024-07-26 DIAGNOSIS — Z79.4 TYPE 2 DIABETES MELLITUS WITH DIABETIC POLYNEUROPATHY, WITH LONG-TERM CURRENT USE OF INSULIN (HCC): Primary | ICD-10-CM

## 2024-07-26 DIAGNOSIS — I50.32 CHRONIC DIASTOLIC CONGESTIVE HEART FAILURE (HCC): ICD-10-CM

## 2024-07-26 DIAGNOSIS — E11.42 TYPE 2 DIABETES MELLITUS WITH DIABETIC POLYNEUROPATHY, WITH LONG-TERM CURRENT USE OF INSULIN (HCC): Primary | ICD-10-CM

## 2024-07-26 DIAGNOSIS — E78.2 MIXED HYPERLIPIDEMIA: ICD-10-CM

## 2024-07-26 DIAGNOSIS — M79.601 RIGHT ARM PAIN: ICD-10-CM

## 2024-07-26 DIAGNOSIS — J44.1 CHRONIC OBSTRUCTIVE PULMONARY DISEASE WITH ACUTE EXACERBATION (HCC): ICD-10-CM

## 2024-07-26 DIAGNOSIS — Z09 HOSPITAL DISCHARGE FOLLOW-UP: ICD-10-CM

## 2024-07-26 DIAGNOSIS — I25.83 CORONARY ARTERY DISEASE DUE TO LIPID RICH PLAQUE: ICD-10-CM

## 2024-07-26 PROCEDURE — 73090 X-RAY EXAM OF FOREARM: CPT

## 2024-07-26 PROCEDURE — 73110 X-RAY EXAM OF WRIST: CPT

## 2024-07-26 RX ORDER — INSULIN ASPART 100 [IU]/ML
INJECTION, SOLUTION INTRAVENOUS; SUBCUTANEOUS
Qty: 5 ADJUSTABLE DOSE PRE-FILLED PEN SYRINGE | Refills: 11 | Status: SHIPPED | OUTPATIENT
Start: 2024-07-26

## 2024-07-26 RX ORDER — ALOGLIPTIN 12.5 MG/1
12.5 TABLET, FILM COATED ORAL DAILY
Qty: 30 TABLET | Refills: 6 | Status: SHIPPED | OUTPATIENT
Start: 2024-07-26

## 2024-07-26 NOTE — PROGRESS NOTES
Post-Discharge Transitional Care  Follow Up      Mariangel Abreu   YOB: 1950    Date of Office Visit:  7/26/2024  Date of Hospital Admission: 7/8/24  Date of Hospital Discharge: 7/14/24  Risk of hospital readmission (high >=14%. Medium >=10%) :Readmission Risk Score: 21.3      Care management risk score Rising risk (score 2-5) and Complex Care (Scores >=6): No Risk Score On File     Non face to face  following discharge, date last encounter closed (first attempt may have been earlier): 07/16/2024    Call initiated 2 business days of discharge: Yes    ASSESSMENT/PLAN:   Type 2 diabetes mellitus with diabetic polyneuropathy, with long-term current use of insulin (HCC)  Coronary artery disease due to lipid rich plaque  Mixed hyperlipidemia  Chronic diastolic congestive heart failure (HCC)  -     DME Order for Pulse Ox as OP  Chronic obstructive pulmonary disease with acute exacerbation (HCC)  -     DME Order for Pulse Ox as OP  Hospital discharge follow-up  -     IA DISCHARGE MEDS RECONCILED W/ CURRENT OUTPATIENT MED LIST  Right arm pain    Continue with cardiology, tolerating Imdur at this time without further complaints of chest pain  Patient remains hyperglycemic with A1c over 14 at last check.  I adjusted her sliding scale insulin today and she is scheduled to see diabetic education on 7/30/2024.  The patient has declined endocrinology referrals multiple times due to transportation and cost of specialist.  Patient with acute right wrist pain, mainly ulnar in nature.  He does utilize a motorized wheelchair with her right hand, which may be exacerbating her symptoms.  No known fall or injury, however I suspect a strain exacerbated by lifting boxes a few days ago.  Continue bracing of the wrist at this time, I highly encouraged no more than Motrin 400 mg 3 times a day over the next 2 or 3 days given she does have history of stage III CKD.  Does receive Percocet from pain management.  X-rays for

## 2024-07-26 NOTE — PATIENT INSTRUCTIONS
IF<139 NO INSULIN; 140-199-4 UN;  200-249-6 UN;  250-299-8 UN;  300-349-10 UN;  350-400=12 UN;  ABOVE 400-14 UNIT(S)

## 2024-07-29 ENCOUNTER — HOSPITAL ENCOUNTER (OUTPATIENT)
Dept: CARDIAC REHAB | Age: 74
Setting detail: THERAPIES SERIES
End: 2024-07-29
Payer: MEDICARE

## 2024-07-29 RX ORDER — TICAGRELOR 90 MG/1
90 TABLET ORAL 2 TIMES DAILY
Qty: 60 TABLET | Refills: 1 | Status: SHIPPED | OUTPATIENT
Start: 2024-07-29

## 2024-07-29 NOTE — TELEPHONE ENCOUNTER
Please advise the patient that I did give her 2-month refill but she does need follow-up with cardiology to verify if they wish to continue the prescription

## 2024-07-30 ENCOUNTER — HOSPITAL ENCOUNTER (OUTPATIENT)
Dept: DIABETES SERVICES | Age: 74
Setting detail: THERAPIES SERIES
Discharge: HOME | End: 2024-07-30
Payer: MEDICARE

## 2024-07-30 PROCEDURE — G0108 DIAB MANAGE TRN  PER INDIV: HCPCS

## 2024-07-30 NOTE — PROGRESS NOTES
dentist    24 rs - of concern is CV risk reduction with recent MI/ and stents   Developing strategies to address psychosocial issues:  Describe feelings about living with diabetes; Describe how stress, depression & anxiety affect blood glucose; Identify coping strategies; Identify support needed & support network available. 1    10-16-23 BB lives in apartment complex - she has a close friend there who checked on her apartment when she was hospitalized. Has a cat. Has children, 8 grand kids and 19 great grandkids     24 rs - of concern is that patient son  in March of this year suddenly and she stated she is still grieving his loss ( both her her son have now passed before her) close with grandkids   Developing strategies to promote health/change behavior: Identify 7 self-care behaviors; Personal health risk factors; Benefits, challenges & strategies for behavioral change;    1         Individualized goal selection.              My goal , to help me improve my health, I will:   Healthy eating - stop drinking regular pop    2. Medication - take long acting insulin consistently    3. Explore use of CGM         Plan  Follow-up Appointments planned  - wants to join Nov class  24 - start in Aug 2024 class  and  one on one for CGM start if able to get all supplies     Instruction Method: [x]Lecture/Discussion  []Power Point Presentation  []Handouts  []Return Demonstration    Education Materials/Equipment Provided (VIA Mail for phone visits)  :    [x]Self-Management - Initial assessment - Enrolment in to ADA “ Where do I Begin, Living with Type 2 diabetes ADA home support program and  handout on diabetes education classes.     Encounter Type Date Start Time End Time Comments No Show Dates   Assessment 10-16-23BB    24 rs    11am    1 30  12pm    2 30      Face to face    Face to face     DSME 10-27-23 BB 10:20am 11am  Difficulty with transportation - this is the second time that the patient was not

## 2024-07-31 ENCOUNTER — HOSPITAL ENCOUNTER (OUTPATIENT)
Dept: CARDIAC REHAB | Age: 74
Setting detail: THERAPIES SERIES
Discharge: HOME | End: 2024-07-31
Payer: MEDICARE

## 2024-08-05 ENCOUNTER — HOSPITAL ENCOUNTER (OUTPATIENT)
Dept: CARDIAC REHAB | Age: 74
Setting detail: THERAPIES SERIES
Discharge: HOME | End: 2024-08-05
Payer: MEDICARE

## 2024-08-05 DIAGNOSIS — M79.601 RIGHT ARM PAIN: Primary | ICD-10-CM

## 2024-08-06 ENCOUNTER — HOSPITAL ENCOUNTER (OUTPATIENT)
Dept: DIABETES SERVICES | Age: 74
Setting detail: THERAPIES SERIES
Discharge: HOME | End: 2024-08-06
Payer: MEDICARE

## 2024-08-06 PROCEDURE — G0108 DIAB MANAGE TRN  PER INDIV: HCPCS

## 2024-08-06 PROCEDURE — 95249 CONT GLUC MNTR PT PROV EQP: CPT

## 2024-08-06 NOTE — PROGRESS NOTES
ticagrelor (BRILINTA) 90 MG TABS tablet TAKE 1 TABLET BY MOUTH 2 TIMES DAILY 60 tablet 1    Continuous Glucose  (FREESTYLE SHIVA 2 READER) HARMEET Apply  with shiva sensor daily and as needed 1 each 0    Continuous Glucose Sensor (FREESTYLE SHIVA 2 SENSOR) MISC Apply to arm once every 2 weeks. 6 each 5    alogliptin (NESINA) 12.5 MG TABS tablet Take 1 tablet by mouth daily 30 tablet 6    insulin aspart (NOVOLOG FLEXPEN) 100 UNIT/ML injection pen IF<139 NO INSULIN; 140-199-4 UN;200-249-6 UN;250-299-8 UN;300-349-10 UN;350-400=12 UN;ABOVE 400-14 UNIT(S) 5 Adjustable Dose Pre-filled Pen Syringe 11    oxyCODONE-acetaminophen (PERCOCET) 5-325 MG per tablet Take 1 tablet by mouth every 8 hours as needed for Pain for up to 30 days. Intended supply: 30 days. 90 tablet 0    amLODIPine (NORVASC) 2.5 MG tablet Take 1 tablet by mouth daily 30 tablet 3    isosorbide mononitrate (IMDUR) 30 MG extended release tablet Take 1 tablet by mouth daily 30 tablet 3    insulin glargine (LANTUS SOLOSTAR) 100 UNIT/ML injection pen Inject 50 Units into the skin every morning AND 40 Units nightly. 15 Adjustable Dose Pre-filled Pen Syringe 1    metFORMIN (GLUCOPHAGE-XR) 500 MG extended release tablet Take 1 tablet by mouth daily (with breakfast) 90 tablet 1    rosuvastatin (CRESTOR) 40 MG tablet Take 1 tablet by mouth nightly 30 tablet 3    topiramate (TOPAMAX) 100 MG tablet GIVE 1 TABLET BY MOUTH ONE TIME A DAY FOR SEIZURES 30 tablet 11    pantoprazole (PROTONIX) 40 MG tablet GIVE 1 TABLET BY MOUTH TWO TIMES A DAY FOR INDIGESTION 60 tablet 11    oxyBUTYnin (DITROPAN-XL) 5 MG extended release tablet GIVE 1 TABLET BY MOUTH ONE TIME A DAY FOR BLADDER SPASM 30 tablet 11    midodrine (PROAMATINE) 2.5 MG tablet GIVE 1 TABLET BY MOUTH WITH MEALS FOR HYPOTENSION ** HOLD IF SBP IS GREATER THAN 110 90 tablet 11    metoprolol tartrate (LOPRESSOR) 25 MG tablet GIVE 0.5 TABLET BY MOUTH TWO TIMES A DAY FOR HTN 30 tablet 11    escitalopram

## 2024-08-07 ENCOUNTER — HOSPITAL ENCOUNTER (OUTPATIENT)
Dept: CARDIAC REHAB | Age: 74
Setting detail: THERAPIES SERIES
Discharge: HOME | End: 2024-08-07
Payer: MEDICARE

## 2024-08-07 VITALS — WEIGHT: 220 LBS | BODY MASS INDEX: 40.24 KG/M2

## 2024-08-07 PROCEDURE — 93798 PHYS/QHP OP CAR RHAB W/ECG: CPT

## 2024-08-07 ASSESSMENT — EXERCISE STRESS TEST
PEAK_METS: 3.1
PEAK_HR: 93
PEAK_RPE: 13
PEAK_BP: 120/70

## 2024-08-12 ENCOUNTER — HOSPITAL ENCOUNTER (OUTPATIENT)
Dept: CARDIAC REHAB | Age: 74
Setting detail: THERAPIES SERIES
Discharge: HOME | End: 2024-08-12
Payer: MEDICARE

## 2024-08-13 ENCOUNTER — HOSPITAL ENCOUNTER (OUTPATIENT)
Dept: DIABETES SERVICES | Age: 74
Setting detail: THERAPIES SERIES
Discharge: HOME | End: 2024-08-13
Payer: MEDICARE

## 2024-08-13 ENCOUNTER — TELEPHONE (OUTPATIENT)
Dept: DIABETES SERVICES | Age: 74
End: 2024-08-13

## 2024-08-13 DIAGNOSIS — Z79.4 TYPE 2 DIABETES MELLITUS WITH DIABETIC POLYNEUROPATHY, WITH LONG-TERM CURRENT USE OF INSULIN (HCC): Primary | ICD-10-CM

## 2024-08-13 DIAGNOSIS — E11.42 TYPE 2 DIABETES MELLITUS WITH DIABETIC POLYNEUROPATHY, WITH LONG-TERM CURRENT USE OF INSULIN (HCC): Primary | ICD-10-CM

## 2024-08-13 PROCEDURE — G0109 DIAB MANAGE TRN IND/GROUP: HCPCS

## 2024-08-13 NOTE — PROGRESS NOTES
BB                  8/6/24 rs          8/13/24 rs  10:20am                3:00             0 930  11am                3:30            1000   Difficulty with transportation - this is the second time that the patient was not picked up by the cab. Changed to phone call. She is looking for a solution with cab service moving forward      X how to use insulin correction scale and start of cgm - khadra 2 with use of quick start guide  ( billed as CGM start)       X - before class update on CGM feedback ( HI) and BG elevated 501 on meter in office - message to PCP - insulin adjustment made           Insulin Instrx      [x]Pen           Encounter Type Date Start Time End Time Comments No Show Dates   Class 1 - On the Road to Better Managing Your Diabetes 8/13/24 rs  1000  1100   - lots of questions in group class and good participation - needs one on one pre and post class support on BG trend and insulin use / dose adjustments - stated weekly goal to keep checking her BG and to take insulins and eat healthy meals    Class 2- Diabetes and  Healthy Eating         Class 3 - Monitoring Your Blood Glucose        Class 4 -  Continuing Your Journey with Diabetes         Class 5 - 3 month follow up / goal reassessment          Follow up Sessions in Group Setting     [x] On the Road to Better Managing Your Diabetes       Book How to Thrive: A guide for Your Journey with Diabetes ADA booklet -pages 4, 14-19, 22-23, 24 -27 -- 8/13/24 rs        Handouts : ADA starter Walking Plan, One Day Diet Record, Goal setting sheet    [] Diabetes and  Healthy Eating   How to Thrive: A guide for Your journey with Diabetes - ADA booklet - pages 20- 21 & 42-43  Handouts: Smarter snacking, Lowdown on low - carb diets, Supermarket savvy, Ready, set, start counting, Reading a nutrition fact label, 7 ways to size up your servings    []  Monitoring Your Blood Glucose         How to Thrive: A guide for Your journey with Diabetes - ADA booklet - pages 7, 14 -

## 2024-08-13 NOTE — TELEPHONE ENCOUNTER
Mariangel Abreu  In for DMED and she did get started on khadra 2    All the readings for the last week were HI    Bg checked in office was 501    She was advised to take sisi log on scale once she finished class today ( at noon) 14 units per correction scale    See media section for the reports    Vivian ALEXIS

## 2024-08-13 NOTE — TELEPHONE ENCOUNTER
Please advise the patient that at this time I want to adjust her fast acting insulin dosing.  I want her to take 5 units with each meal plus the sliding scale based on her sugars.  For example her sliding scale states to take 14 units of insulin if sugar is higher than 400.  She will take 5 units plus the 14 directed on the sliding scale

## 2024-08-13 NOTE — PROGRESS NOTES
Noted message from Carla Chua, APRN - CNP    Please advise the patient that at this time I want to adjust her fast acting insulin dosing.  I want her to take 5 units with each meal plus the sliding scale based on her sugars.  For example her sliding scale states to take 14 units of insulin if sugar is higher than 400.  She will take 5 units plus the 14 directed on the sliding scale --     Writer called patient at about 3 : 45 pm ( 8/13/24) Yesterday and writer read this message to the patient      Patient stated she took 14 units of Novolog at about 2 pm once she got home from the session at [facility]    Called patient back  at 4:15 pm and she stated she now at  per her cgm 439     She plans to eat meal at 6 to 7 pm and use novolog again and then take the 5 units + correction scale     Bedtime tonight she will take long acting basaglar at 50 units     Patient to call writer tomorrow  with BG trend     She has cardiac rehab [facility] and will ask them to check BG as well as this site as well.        EBENEZER RDZ RN [facility]

## 2024-08-14 ENCOUNTER — HOSPITAL ENCOUNTER (OUTPATIENT)
Dept: CARDIAC REHAB | Age: 74
Setting detail: THERAPIES SERIES
Discharge: HOME | End: 2024-08-14
Payer: MEDICARE

## 2024-08-14 VITALS — BODY MASS INDEX: 41.15 KG/M2 | WEIGHT: 225 LBS

## 2024-08-14 PROCEDURE — 93797 PHYS/QHP OP CAR RHAB WO ECG: CPT

## 2024-08-14 PROCEDURE — 93798 PHYS/QHP OP CAR RHAB W/ECG: CPT

## 2024-08-14 ASSESSMENT — EXERCISE STRESS TEST
PEAK_HR: 67
PEAK_RPE: 13
PEAK_METS: 2.8

## 2024-08-14 NOTE — TELEPHONE ENCOUNTER
I was reviewing meds, trying to decide if she would benefit from Toujeo or Tresiba. I am concerned with her current insulin volume that this may not be as helpful

## 2024-08-16 ENCOUNTER — HOSPITAL ENCOUNTER (OUTPATIENT)
Dept: PAIN MANAGEMENT | Age: 74
Discharge: HOME | End: 2024-08-16
Payer: MEDICARE

## 2024-08-16 VITALS — HEIGHT: 62 IN | BODY MASS INDEX: 41.41 KG/M2 | WEIGHT: 225 LBS

## 2024-08-16 DIAGNOSIS — M54.16 LUMBAR RADICULOPATHY, CHRONIC: ICD-10-CM

## 2024-08-16 DIAGNOSIS — M50.30 DDD (DEGENERATIVE DISC DISEASE), CERVICAL: Chronic | ICD-10-CM

## 2024-08-16 DIAGNOSIS — M25.562 CHRONIC PAIN OF LEFT KNEE: ICD-10-CM

## 2024-08-16 DIAGNOSIS — M54.42 CHRONIC BILATERAL LOW BACK PAIN WITH BILATERAL SCIATICA: Chronic | ICD-10-CM

## 2024-08-16 DIAGNOSIS — M47.817 LUMBOSACRAL SPONDYLOSIS WITHOUT MYELOPATHY: Chronic | ICD-10-CM

## 2024-08-16 DIAGNOSIS — M54.41 CHRONIC BILATERAL LOW BACK PAIN WITH BILATERAL SCIATICA: Chronic | ICD-10-CM

## 2024-08-16 DIAGNOSIS — M51.36 DDD (DEGENERATIVE DISC DISEASE), LUMBAR: ICD-10-CM

## 2024-08-16 DIAGNOSIS — M46.1 SACROILIITIS, NOT ELSEWHERE CLASSIFIED (HCC): Primary | Chronic | ICD-10-CM

## 2024-08-16 DIAGNOSIS — G89.29 CHRONIC PAIN OF LEFT KNEE: ICD-10-CM

## 2024-08-16 DIAGNOSIS — G89.29 CHRONIC BILATERAL LOW BACK PAIN WITH BILATERAL SCIATICA: Chronic | ICD-10-CM

## 2024-08-16 PROCEDURE — 99213 OFFICE O/P EST LOW 20 MIN: CPT | Performed by: NURSE PRACTITIONER

## 2024-08-16 PROCEDURE — 99213 OFFICE O/P EST LOW 20 MIN: CPT

## 2024-08-16 RX ORDER — OXYCODONE HYDROCHLORIDE AND ACETAMINOPHEN 5; 325 MG/1; MG/1
1 TABLET ORAL EVERY 8 HOURS PRN
Qty: 90 TABLET | Refills: 0 | Status: SHIPPED | OUTPATIENT
Start: 2024-08-18 | End: 2024-09-17

## 2024-08-16 ASSESSMENT — ENCOUNTER SYMPTOMS
BACK PAIN: 1
CONSTIPATION: 0
COUGH: 0
BOWEL INCONTINENCE: 0
SHORTNESS OF BREATH: 0

## 2024-08-16 ASSESSMENT — PAIN DESCRIPTION - FREQUENCY: FREQUENCY: CONTINUOUS

## 2024-08-16 ASSESSMENT — PAIN DESCRIPTION - PAIN TYPE: TYPE: CHRONIC PAIN

## 2024-08-16 ASSESSMENT — PAIN DESCRIPTION - LOCATION: LOCATION: BACK

## 2024-08-16 ASSESSMENT — PAIN SCALES - GENERAL: PAINLEVEL_OUTOF10: 8

## 2024-08-16 ASSESSMENT — PAIN DESCRIPTION - ORIENTATION: ORIENTATION: LOWER

## 2024-08-16 ASSESSMENT — PAIN DESCRIPTION - DESCRIPTORS: DESCRIPTORS: ACHING

## 2024-08-16 NOTE — PROGRESS NOTES
Family History   Problem Relation Age of Onset    Diabetes Mother     Heart Disease Mother     Cancer Father     Stomach Cancer Father     Emphysema Sister     Diabetes Maternal Grandmother         also aunts and uncles (maternal)    Breast Cancer Maternal Aunt        Social History     Socioeconomic History    Marital status: Legally      Spouse name: Not on file    Number of children: Not on file    Years of education: Not on file    Highest education level: Not on file   Occupational History    Occupation: disabled     Employer: N/A   Tobacco Use    Smoking status: Former     Current packs/day: 0.00     Average packs/day: 3.0 packs/day for 41.0 years (123.0 ttl pk-yrs)     Types: Cigarettes     Start date: 1959     Quit date: 2000     Years since quittin.6    Smokeless tobacco: Never    Tobacco comments:     quit in    Vaping Use    Vaping status: Never Used   Substance and Sexual Activity    Alcohol use: No     Alcohol/week: 0.0 standard drinks of alcohol    Drug use: No    Sexual activity: Not Currently   Other Topics Concern    Not on file   Social History Narrative    Not on file     Social Determinants of Health     Financial Resource Strain: Low Risk  (3/5/2024)    Overall Financial Resource Strain (CARDIA)     Difficulty of Paying Living Expenses: Not hard at all   Food Insecurity: No Food Insecurity (2024)    Hunger Vital Sign     Worried About Running Out of Food in the Last Year: Never true     Ran Out of Food in the Last Year: Never true   Transportation Needs: No Transportation Needs (2024)    PRAPARE - Transportation     Lack of Transportation (Medical): No     Lack of Transportation (Non-Medical): No   Physical Activity: Inactive (2023)    Exercise Vital Sign     Days of Exercise per Week: 0 days     Minutes of Exercise per Session: 0 min   Stress: Stress Concern Present (2024)    [employer] [place] [employer] - Occupational Stress

## 2024-08-19 ENCOUNTER — HOSPITAL ENCOUNTER (OUTPATIENT)
Dept: CARDIAC REHAB | Age: 74
Setting detail: THERAPIES SERIES
Discharge: HOME | End: 2024-08-19
Payer: MEDICARE

## 2024-08-19 NOTE — PROGRESS NOTES
Pt called to cancel Cardiac Rehab session for today, states she is having CP, has taken a ntg and will call a squad if it does not go away.

## 2024-08-20 ENCOUNTER — HOSPITAL ENCOUNTER (OUTPATIENT)
Dept: DIABETES SERVICES | Age: 74
Setting detail: THERAPIES SERIES
End: 2024-08-20
Payer: MEDICARE

## 2024-08-21 ENCOUNTER — HOSPITAL ENCOUNTER (OUTPATIENT)
Dept: CARDIAC REHAB | Age: 74
Setting detail: THERAPIES SERIES
Discharge: HOME | End: 2024-08-21
Payer: MEDICARE

## 2024-08-21 VITALS — WEIGHT: 229 LBS | BODY MASS INDEX: 41.88 KG/M2

## 2024-08-21 PROCEDURE — 93798 PHYS/QHP OP CAR RHAB W/ECG: CPT

## 2024-08-21 ASSESSMENT — EXERCISE STRESS TEST
PEAK_RPE: 13
PEAK_HR: 86
PEAK_BP: 102/54
PEAK_METS: 3
PEAK_BP: 102/54

## 2024-08-21 ASSESSMENT — EJECTION FRACTION: EF_VALUE: 78

## 2024-08-21 NOTE — FLOWSHEET NOTE
Capacity Goal TO INCREASE ENDURANCE   Functional Capacity Goal Status Progressing   Functional Capacity Goal Comments PT STATES SHE FEELS MORE INDEPENDENT AND CAN DO MORE AT HOME SINCE SHE STARTED THE PROGRAM WITHOUT GETTING TIRED. SHE ALSO IS INCREASING HER EXERCISE TIME PER THE PROGRAM PROTOCOL.   Functional Capacity Goal Assessment Recommendations

## 2024-08-26 ENCOUNTER — HOSPITAL ENCOUNTER (OUTPATIENT)
Dept: CARDIAC REHAB | Age: 74
Setting detail: THERAPIES SERIES
Discharge: HOME | End: 2024-08-26
Attending: INTERNAL MEDICINE
Payer: MEDICARE

## 2024-08-26 ENCOUNTER — TELEPHONE (OUTPATIENT)
Dept: PRIMARY CARE CLINIC | Age: 74
End: 2024-08-26

## 2024-08-26 ENCOUNTER — APPOINTMENT (OUTPATIENT)
Dept: CARDIAC REHAB | Age: 74
End: 2024-08-26
Attending: INTERNAL MEDICINE
Payer: MEDICARE

## 2024-08-26 ENCOUNTER — APPOINTMENT (OUTPATIENT)
Dept: CARDIAC REHAB | Age: 74
End: 2024-08-26
Payer: MEDICARE

## 2024-08-26 VITALS — WEIGHT: 226 LBS | BODY MASS INDEX: 41.34 KG/M2

## 2024-08-26 DIAGNOSIS — E11.42 TYPE 2 DIABETES MELLITUS WITH DIABETIC POLYNEUROPATHY, WITH LONG-TERM CURRENT USE OF INSULIN (HCC): ICD-10-CM

## 2024-08-26 DIAGNOSIS — I25.10 CORONARY ARTERY DISEASE DUE TO LIPID RICH PLAQUE: Primary | ICD-10-CM

## 2024-08-26 DIAGNOSIS — I48.21 PERMANENT ATRIAL FIBRILLATION (HCC): ICD-10-CM

## 2024-08-26 DIAGNOSIS — I25.83 CORONARY ARTERY DISEASE DUE TO LIPID RICH PLAQUE: Primary | ICD-10-CM

## 2024-08-26 DIAGNOSIS — Z79.4 TYPE 2 DIABETES MELLITUS WITH DIABETIC POLYNEUROPATHY, WITH LONG-TERM CURRENT USE OF INSULIN (HCC): ICD-10-CM

## 2024-08-26 DIAGNOSIS — I50.32 CHRONIC DIASTOLIC CONGESTIVE HEART FAILURE (HCC): ICD-10-CM

## 2024-08-26 PROCEDURE — 93798 PHYS/QHP OP CAR RHAB W/ECG: CPT

## 2024-08-26 ASSESSMENT — EXERCISE STRESS TEST
PEAK_HR: 86
PEAK_METS: 3
PEAK_RPE: 13
PEAK_BP: 120/70

## 2024-08-26 NOTE — TELEPHONE ENCOUNTER
Please advise patient that I did place referral to home health care with the [facility]

## 2024-08-26 NOTE — TELEPHONE ENCOUNTER
Malu  for [employer] called in and stated Mariangel reported that she is having a hard time bathing her self and would like a referral for a HHA.

## 2024-08-27 ENCOUNTER — HOSPITAL ENCOUNTER (OUTPATIENT)
Dept: DIABETES SERVICES | Age: 74
Setting detail: THERAPIES SERIES
Discharge: HOME | End: 2024-08-27
Payer: MEDICARE

## 2024-08-27 PROCEDURE — G0109 DIAB MANAGE TRN IND/GROUP: HCPCS

## 2024-08-27 NOTE — PROGRESS NOTES
Diabetes Self- Management Education Program Assessment -   Also see Diabetic Screening  Patient, Mariangel Abreu,  here for diabetes self-management education  visit/ assessment.   Today's visit was in an individual setting.    MEDICAL HISTORY:  Past Medical History:   Diagnosis Date    Abdominal bloating 01/26/2021    Adenomatous polyp of ascending colon 01/26/2021    Allergic rhinitis     Anxiety 07/17/2013    Arthritis     Asthma     Atrial fibrillation ([facility])     Back pain     NERVE/DR. GRACIA    Benign hypertension with CKD (chronic kidney disease) stage III ([facility])     CAD (coronary artery disease) 03/21/2013    Caffeine use     2 coffee/day    CHF (congestive heart failure) ([facility])     Chronic kidney disease     CKD (chronic kidney disease) stage 3, GFR 30-59 ml/min ([facility])     Claudication ([facility]) 6/2/2024    COPD (chronic obstructive pulmonary disease) ([facility])     emphysema    Degeneration of lumbar or lumbosacral intervertebral disc     Depression     DM (diabetes mellitus) ([facility])     Emphysema of lung ([facility])     Gastritis     GERD (gastroesophageal reflux disease)     Hearing loss     Hematuria     Hiatal hernia     History of loop recorder     HTN (hypertension), benign 06/28/2014    Hypertriglyceridemia     Incontinence of urine 09/25/2013    Irritable bowel syndrome with both constipation and diarrhea 01/26/2021    Kidney stone     Knee arthropathy     Long term (current) use of anticoagulants 02/08/2024    Lumbosacral spondylosis without myelopathy 09/29/2014    Migraine headache     DR. GRACIA    Mumps     Neuropathy     Obesity     On home oxygen therapy     2 L PER NC  HS AND PRN    HIGINIO on CPAP     Otitis media 01/26/2015    Secondary diabetes mellitus with stage 3 chronic kidney disease (GFR 30-59) ([facility])     STEMI (ST elevation myocardial infarction) ([facility]) 02/01/2024    Stroke ([facility])      mini stroke.    Tinnitus     Type 2 diabetes mellitus without complication ([facility])     Type II or unspecified type  Plan, One Day Diet Record, Goal setting sheet    [] Diabetes and  Healthy Eating   How to Thrive: A guide for Your journey with Diabetes - ADA booklet - pages 20- 21 & 42-43  Handouts: Smarter snacking, Lowdown on low - carb diets, Supermarket savvy, Ready, set, start counting, Reading a nutrition fact label, 7 ways to size up your servings    [x]  Monitoring Your Blood Glucose         How to Thrive: A guide for Your journey with Diabetes - ADA booklet - pages 7, 14 - 19 and Booklet staying on track with Blood Glucose and lifestyle  tracker/  log book         Handouts: Sick day rules, Diabetes & Alcohol, Traveling with diabetes, Diabetes disaster preparedness plan, ADA -Standard Continuous Glucose Monitoring (CGM) Report                                                        [] Continuing Your Journey with Diabetes     Book How to Thrive: A guide for Your Journey with Diabetes - ADA booklet -  pages 6-7, 12-13, 24-27 & 28 -35  Handouts: AADE 7 Being Active,  ADA the Emotional Side of Diabetes, Type 2 diabetes and role of GLP- 1                                                []  Self care and goal review -  3 month follow -    Handouts: AADE7 Self care behaviors work sheets and personal goal setting worksheet review, DSME support options on line     []Glucose Meter /  CGM start     []Insulin starter kit     []Other        DSMS Support :   [] MNT      [] Annual update     [] Starting Fresh  adults living with diabetes or pre diabetes. [facility] [address] - [address] [place], [place]     Free -  REGISTRATION IS REQUIRED - CALL [phone] call for dates    []  Diabetes Group at  [facility]/ [facility] - Free 6 week diabetes education support     EBENEZER RDZ RN

## 2024-08-28 ENCOUNTER — HOSPITAL ENCOUNTER (OUTPATIENT)
Dept: CARDIAC REHAB | Age: 74
Setting detail: THERAPIES SERIES
Discharge: HOME | End: 2024-08-28
Payer: MEDICARE

## 2024-08-28 VITALS — BODY MASS INDEX: 41.52 KG/M2 | WEIGHT: 227 LBS

## 2024-08-28 DIAGNOSIS — I25.83 CORONARY ARTERY DISEASE DUE TO LIPID RICH PLAQUE: Primary | ICD-10-CM

## 2024-08-28 DIAGNOSIS — I25.10 CORONARY ARTERY DISEASE DUE TO LIPID RICH PLAQUE: Primary | ICD-10-CM

## 2024-08-28 DIAGNOSIS — I50.32 CHRONIC DIASTOLIC CONGESTIVE HEART FAILURE (HCC): ICD-10-CM

## 2024-08-28 PROCEDURE — 93798 PHYS/QHP OP CAR RHAB W/ECG: CPT

## 2024-08-28 ASSESSMENT — EXERCISE STRESS TEST
PEAK_HR: 90
PEAK_RPE: 13
PEAK_METS: 3
PEAK_BP: 102/62

## 2024-09-03 ENCOUNTER — APPOINTMENT (OUTPATIENT)
Dept: DIABETES SERVICES | Age: 74
End: 2024-09-03
Payer: MEDICARE

## 2024-09-04 ENCOUNTER — HOSPITAL ENCOUNTER (OUTPATIENT)
Dept: CARDIAC REHAB | Age: 74
Setting detail: THERAPIES SERIES
Discharge: HOME | End: 2024-09-04

## 2024-09-04 NOTE — PROGRESS NOTES
Pt was no show for Cardiac Rehab, called us at 0820, states she has been waiting for her cab, states they changed her cab company again.

## 2024-09-09 ENCOUNTER — HOSPITAL ENCOUNTER (OUTPATIENT)
Dept: CARDIAC REHAB | Age: 74
Setting detail: THERAPIES SERIES
Discharge: HOME | End: 2024-09-09
Payer: MEDICARE

## 2024-09-11 ENCOUNTER — TELEPHONE (OUTPATIENT)
Dept: PRIMARY CARE CLINIC | Age: 74
End: 2024-09-11

## 2024-09-11 ENCOUNTER — HOSPITAL ENCOUNTER (OUTPATIENT)
Dept: CARDIAC REHAB | Age: 74
Setting detail: THERAPIES SERIES
Discharge: HOME | End: 2024-09-11
Payer: MEDICARE

## 2024-09-11 VITALS — BODY MASS INDEX: 41.7 KG/M2 | WEIGHT: 228 LBS

## 2024-09-11 DIAGNOSIS — Z79.4 TYPE 2 DIABETES MELLITUS WITH DIABETIC POLYNEUROPATHY, WITH LONG-TERM CURRENT USE OF INSULIN (HCC): Primary | ICD-10-CM

## 2024-09-11 DIAGNOSIS — E11.42 TYPE 2 DIABETES MELLITUS WITH DIABETIC POLYNEUROPATHY, WITH LONG-TERM CURRENT USE OF INSULIN (HCC): Primary | ICD-10-CM

## 2024-09-11 PROCEDURE — 93798 PHYS/QHP OP CAR RHAB W/ECG: CPT

## 2024-09-11 ASSESSMENT — EXERCISE STRESS TEST
PEAK_RPE: 11
PEAK_METS: 3.1
PEAK_HR: 105
PEAK_BP: 130/84

## 2024-09-13 ENCOUNTER — HOSPITAL ENCOUNTER (OUTPATIENT)
Dept: PAIN MANAGEMENT | Age: 74
Discharge: HOME | End: 2024-09-13
Payer: MEDICARE

## 2024-09-13 VITALS — HEIGHT: 62 IN | BODY MASS INDEX: 41.96 KG/M2 | WEIGHT: 228 LBS

## 2024-09-13 DIAGNOSIS — M50.30 DDD (DEGENERATIVE DISC DISEASE), CERVICAL: Chronic | ICD-10-CM

## 2024-09-13 DIAGNOSIS — Z79.891 CHRONIC USE OF OPIATE FOR THERAPEUTIC PURPOSE: ICD-10-CM

## 2024-09-13 DIAGNOSIS — M51.36 DDD (DEGENERATIVE DISC DISEASE), LUMBAR: ICD-10-CM

## 2024-09-13 DIAGNOSIS — M47.817 LUMBOSACRAL SPONDYLOSIS WITHOUT MYELOPATHY: Primary | Chronic | ICD-10-CM

## 2024-09-13 DIAGNOSIS — M46.1 SACROILIITIS, NOT ELSEWHERE CLASSIFIED (HCC): Chronic | ICD-10-CM

## 2024-09-13 DIAGNOSIS — M54.16 LUMBAR RADICULOPATHY, CHRONIC: ICD-10-CM

## 2024-09-13 PROCEDURE — 99213 OFFICE O/P EST LOW 20 MIN: CPT | Performed by: NURSE PRACTITIONER

## 2024-09-13 PROCEDURE — 99213 OFFICE O/P EST LOW 20 MIN: CPT

## 2024-09-13 RX ORDER — OXYCODONE AND ACETAMINOPHEN 5; 325 MG/1; MG/1
1 TABLET ORAL EVERY 8 HOURS PRN
Qty: 90 TABLET | Refills: 0 | Status: SHIPPED | OUTPATIENT
Start: 2024-09-18 | End: 2024-10-18

## 2024-09-13 ASSESSMENT — ENCOUNTER SYMPTOMS
COUGH: 0
CONSTIPATION: 0
BACK PAIN: 1
SHORTNESS OF BREATH: 0

## 2024-09-16 ENCOUNTER — HOSPITAL ENCOUNTER (OUTPATIENT)
Dept: CARDIAC REHAB | Age: 74
Setting detail: THERAPIES SERIES
Discharge: HOME | End: 2024-09-16
Payer: MEDICARE

## 2024-09-16 VITALS — WEIGHT: 228 LBS | BODY MASS INDEX: 41.7 KG/M2

## 2024-09-16 DIAGNOSIS — Z79.4 TYPE 2 DIABETES MELLITUS WITH DIABETIC POLYNEUROPATHY, WITH LONG-TERM CURRENT USE OF INSULIN (HCC): Primary | ICD-10-CM

## 2024-09-16 DIAGNOSIS — E11.42 TYPE 2 DIABETES MELLITUS WITH DIABETIC POLYNEUROPATHY, WITH LONG-TERM CURRENT USE OF INSULIN (HCC): Primary | ICD-10-CM

## 2024-09-16 PROCEDURE — 93798 PHYS/QHP OP CAR RHAB W/ECG: CPT

## 2024-09-16 ASSESSMENT — EXERCISE STRESS TEST
PEAK_BP: 122/70
PEAK_METS: 3.2
PEAK_RPE: 13
PEAK_HR: 89

## 2024-09-17 ENCOUNTER — HOSPITAL ENCOUNTER (OUTPATIENT)
Dept: DIABETES SERVICES | Age: 74
Setting detail: THERAPIES SERIES
End: 2024-09-17
Payer: MEDICARE

## 2024-09-17 RX ORDER — ROSUVASTATIN CALCIUM 40 MG/1
40 TABLET, COATED ORAL NIGHTLY
Qty: 30 TABLET | Refills: 3 | Status: SHIPPED | OUTPATIENT
Start: 2024-09-17

## 2024-09-17 RX ORDER — TICAGRELOR 90 MG/1
90 TABLET ORAL 2 TIMES DAILY
Qty: 60 TABLET | Refills: 1 | OUTPATIENT
Start: 2024-09-17

## 2024-09-18 ENCOUNTER — HOSPITAL ENCOUNTER (OUTPATIENT)
Dept: CARDIAC REHAB | Age: 74
Setting detail: THERAPIES SERIES
Discharge: HOME | End: 2024-09-18
Payer: MEDICARE

## 2024-09-18 ENCOUNTER — APPOINTMENT (OUTPATIENT)
Dept: CARDIAC REHAB | Age: 74
End: 2024-09-18
Payer: MEDICARE

## 2024-09-20 RX ORDER — TICAGRELOR 90 MG/1
90 TABLET ORAL 2 TIMES DAILY
Qty: 60 TABLET | Refills: 1 | OUTPATIENT
Start: 2024-09-20

## 2024-09-20 NOTE — TELEPHONE ENCOUNTER
She needs to schedule and follow-up with cardiology for this medication.  It is managed by cardiologist

## 2024-09-20 NOTE — TELEPHONE ENCOUNTER
Mariangel Abreu is calling to request a refill on the following medication(s):    Medication Request:  Requested Prescriptions     Pending Prescriptions Disp Refills    BRILINTA 90 MG TABS tablet [Pharmacy Med Name: BRILINTA 90MG TABLET] 60 tablet 1     Sig: TAKE 1 TABLET BY MOUTH 2 TIMES DAILY       Last Visit Date (If Applicable):  Visit date not found    Next Visit Date:    Visit date not found

## 2024-09-23 ENCOUNTER — HOSPITAL ENCOUNTER (OUTPATIENT)
Dept: CARDIAC REHAB | Age: 74
Setting detail: THERAPIES SERIES
Discharge: HOME | End: 2024-09-23
Payer: MEDICARE

## 2024-09-25 ENCOUNTER — HOSPITAL ENCOUNTER (OUTPATIENT)
Dept: CARDIAC REHAB | Age: 74
Setting detail: THERAPIES SERIES
Discharge: HOME | End: 2024-09-25
Payer: MEDICARE

## 2024-09-25 VITALS — BODY MASS INDEX: 40.97 KG/M2 | WEIGHT: 224 LBS

## 2024-09-25 PROCEDURE — 93798 PHYS/QHP OP CAR RHAB W/ECG: CPT

## 2024-09-25 ASSESSMENT — EXERCISE STRESS TEST
PEAK_METS: 3.1
PEAK_RPE: 13
PEAK_BP: 106/74
PEAK_HR: 81

## 2024-09-25 ASSESSMENT — NEW YORK HEART ASSOCIATION (NYHA) CLASSIFICATION: NYHA FUNCTIONAL CLASS: CLASS I

## 2024-09-30 ENCOUNTER — HOSPITAL ENCOUNTER (OUTPATIENT)
Dept: CARDIAC REHAB | Age: 74
Setting detail: THERAPIES SERIES
Discharge: HOME | End: 2024-09-30
Payer: MEDICARE

## 2024-09-30 VITALS — BODY MASS INDEX: 41.34 KG/M2 | WEIGHT: 226 LBS

## 2024-09-30 PROCEDURE — 93798 PHYS/QHP OP CAR RHAB W/ECG: CPT

## 2024-09-30 RX ORDER — TICAGRELOR 90 MG/1
90 TABLET ORAL 2 TIMES DAILY
Qty: 60 TABLET | Refills: 1 | OUTPATIENT
Start: 2024-09-30

## 2024-09-30 RX ORDER — ROSUVASTATIN CALCIUM 40 MG/1
40 TABLET, COATED ORAL NIGHTLY
Qty: 30 TABLET | Refills: 3 | OUTPATIENT
Start: 2024-09-30

## 2024-09-30 RX ORDER — BLOOD SUGAR DIAGNOSTIC
STRIP MISCELLANEOUS
Qty: 100 STRIP | Refills: 0 | OUTPATIENT
Start: 2024-09-30

## 2024-09-30 ASSESSMENT — EXERCISE STRESS TEST
PEAK_METS: 3.1
PEAK_HR: 84
PEAK_BP: 112/64
PEAK_RPE: 12

## 2024-09-30 NOTE — TELEPHONE ENCOUNTER
Mariangel Abreu is calling to request a refill on the following medication(s):    Medication Request:  Requested Prescriptions     Pending Prescriptions Disp Refills    BRILINTA 90 MG TABS tablet [Pharmacy Med Name: BRILINTA 90MG TABLET] 60 tablet 1     Sig: TAKE 1 TABLET BY MOUTH 2 TIMES DAILY    rosuvastatin (CRESTOR) 40 MG tablet [Pharmacy Med Name: ROSUVASTATIN CALCIUM 40MG TABLET] 30 tablet 3     Sig: TAKE 1 TABLET BY MOUTH NIGHTLY       Last Visit Date (If Applicable):  Visit date not found    Next Visit Date:    Visit date not found

## 2024-09-30 NOTE — PROGRESS NOTES
Pt's blood sugar this morning is 330, reviewed diet with patient, states she was told in her diabetes education that it is \"ok to eat Anastacio Alfredo breakfast bowls\" states she eats these exclusively for every breakfast. Reviewed nutrition content of 440 calories, 1110 mg sodium, 37 grams of fat, 15 grams of saturated fat in each bowl she is eating. Reviewed this is not a heart healthy or diabetic friendly diet plan, pt v/u. Pt has f/u appointment with diabetes education tomorrow.

## 2024-10-01 ENCOUNTER — HOSPITAL ENCOUNTER (OUTPATIENT)
Dept: DIABETES SERVICES | Age: 74
Setting detail: THERAPIES SERIES
Discharge: HOME | End: 2024-10-01
Payer: MEDICARE

## 2024-10-01 DIAGNOSIS — E11.42 TYPE 2 DIABETES MELLITUS WITH DIABETIC POLYNEUROPATHY, WITH LONG-TERM CURRENT USE OF INSULIN (HCC): Primary | ICD-10-CM

## 2024-10-01 DIAGNOSIS — Z79.4 TYPE 2 DIABETES MELLITUS WITH DIABETIC POLYNEUROPATHY, WITH LONG-TERM CURRENT USE OF INSULIN (HCC): Primary | ICD-10-CM

## 2024-10-01 PROCEDURE — G0108 DIAB MANAGE TRN  PER INDIV: HCPCS

## 2024-10-01 NOTE — PROGRESS NOTES
Diabetes Self- Management Education Program Assessment -   Also see Diabetic Screening  Patient, Mariangel Abreu,  here for diabetes self-management education  visit/ assessment.   Today's visit was in an individual setting.    MEDICAL HISTORY:  Past Medical History:   Diagnosis Date    Abdominal bloating 01/26/2021    Adenomatous polyp of ascending colon 01/26/2021    Allergic rhinitis     Anxiety 07/17/2013    Arthritis     Asthma     Atrial fibrillation ([facility])     Back pain     NERVE/DR. GRACIA    Benign hypertension with CKD (chronic kidney disease) stage III ([facility])     CAD (coronary artery disease) 03/21/2013    Caffeine use     2 coffee/day    CHF (congestive heart failure) ([facility])     Chronic kidney disease     CKD (chronic kidney disease) stage 3, GFR 30-59 ml/min ([facility])     Claudication ([facility]) 6/2/2024    COPD (chronic obstructive pulmonary disease) ([facility])     emphysema    Degeneration of lumbar or lumbosacral intervertebral disc     Depression     DM (diabetes mellitus) ([facility])     Emphysema of lung ([facility])     Gastritis     GERD (gastroesophageal reflux disease)     Hearing loss     Hematuria     Hiatal hernia     History of loop recorder     HTN (hypertension), benign 06/28/2014    Hypertriglyceridemia     Incontinence of urine 09/25/2013    Irritable bowel syndrome with both constipation and diarrhea 01/26/2021    Kidney stone     Knee arthropathy     Long term (current) use of anticoagulants 02/08/2024    Lumbosacral spondylosis without myelopathy 09/29/2014    Migraine headache     DR. GRACIA    Mumps     Neuropathy     Obesity     On home oxygen therapy     2 L PER NC  HS AND PRN    HIGINIO on CPAP     Otitis media 01/26/2015    Secondary diabetes mellitus with stage 3 chronic kidney disease (GFR 30-59) ([facility])     STEMI (ST elevation myocardial infarction) ([facility]) 02/01/2024    Stroke ([facility])      mini stroke.    Tinnitus     Type 2 diabetes mellitus without complication ([facility])     Type II or unspecified type

## 2024-10-02 ENCOUNTER — HOSPITAL ENCOUNTER (OUTPATIENT)
Dept: CARDIAC REHAB | Age: 74
Setting detail: THERAPIES SERIES
Discharge: HOME | End: 2024-10-02
Payer: MEDICARE

## 2024-10-02 VITALS — WEIGHT: 229 LBS | BODY MASS INDEX: 41.88 KG/M2

## 2024-10-02 PROCEDURE — 93798 PHYS/QHP OP CAR RHAB W/ECG: CPT

## 2024-10-02 ASSESSMENT — EXERCISE STRESS TEST
PEAK_BP: 114/70
PEAK_RPE: 12
PEAK_METS: 3.1
PEAK_HR: 89

## 2024-10-04 ENCOUNTER — APPOINTMENT (OUTPATIENT)
Dept: GENERAL RADIOLOGY | Age: 74
End: 2024-10-04
Payer: MEDICARE

## 2024-10-04 ENCOUNTER — HOSPITAL ENCOUNTER (OUTPATIENT)
Age: 74
Setting detail: OBSERVATION
Discharge: HOME | End: 2024-10-07
Attending: EMERGENCY MEDICINE | Admitting: EMERGENCY MEDICINE
Payer: MEDICARE

## 2024-10-04 ENCOUNTER — TELEPHONE (OUTPATIENT)
Dept: PRIMARY CARE CLINIC | Age: 74
End: 2024-10-04

## 2024-10-04 DIAGNOSIS — E11.42 TYPE 2 DIABETES MELLITUS WITH DIABETIC POLYNEUROPATHY, WITH LONG-TERM CURRENT USE OF INSULIN (HCC): Chronic | ICD-10-CM

## 2024-10-04 DIAGNOSIS — Z79.4 TYPE 2 DIABETES MELLITUS WITH DIABETIC POLYNEUROPATHY, WITH LONG-TERM CURRENT USE OF INSULIN (HCC): Chronic | ICD-10-CM

## 2024-10-04 DIAGNOSIS — R07.9 CHEST PAIN, UNSPECIFIED TYPE: Primary | ICD-10-CM

## 2024-10-04 LAB
ALBUMIN SERPL-MCNC: 4.1 G/DL (ref 3.5–5.2)
ALBUMIN/GLOB SERPL: 1 {RATIO} (ref 1–2.5)
ALP SERPL-CCNC: 84 U/L (ref 35–104)
ALT SERPL-CCNC: 12 U/L (ref 10–35)
ANION GAP SERPL CALCULATED.3IONS-SCNC: 12 MMOL/L (ref 9–16)
AST SERPL-CCNC: 20 U/L (ref 10–35)
BASOPHILS # BLD: 0.09 K/UL (ref 0–0.2)
BASOPHILS NFR BLD: 1 % (ref 0–2)
BILIRUB SERPL-MCNC: 0.2 MG/DL (ref 0–1.2)
BNP SERPL-MCNC: 245 PG/ML (ref 0–300)
BUN SERPL-MCNC: 25 MG/DL (ref 8–23)
CALCIUM SERPL-MCNC: 9.5 MG/DL (ref 8.6–10.4)
CHLORIDE SERPL-SCNC: 97 MMOL/L (ref 98–107)
CO2 SERPL-SCNC: 26 MMOL/L (ref 20–31)
CREAT SERPL-MCNC: 1 MG/DL (ref 0.5–0.9)
EOSINOPHIL # BLD: 0.18 K/UL (ref 0–0.44)
EOSINOPHILS RELATIVE PERCENT: 2 % (ref 1–4)
ERYTHROCYTE [DISTWIDTH] IN BLOOD BY AUTOMATED COUNT: 12.9 % (ref 11.8–14.4)
GFR, ESTIMATED: 58 ML/MIN/1.73M2
GLUCOSE BLD-MCNC: 211 MG/DL (ref 65–105)
GLUCOSE SERPL-MCNC: 243 MG/DL (ref 74–99)
HCT VFR BLD AUTO: 42.7 % (ref 36.3–47.1)
HGB BLD-MCNC: 14.1 G/DL (ref 11.9–15.1)
IMM GRANULOCYTES # BLD AUTO: 0.03 K/UL (ref 0–0.3)
IMM GRANULOCYTES NFR BLD: 0 %
LYMPHOCYTES NFR BLD: 2.14 K/UL (ref 1.1–3.7)
LYMPHOCYTES RELATIVE PERCENT: 23 % (ref 24–43)
MCH RBC QN AUTO: 29.7 PG (ref 25.2–33.5)
MCHC RBC AUTO-ENTMCNC: 33 G/DL (ref 28.4–34.8)
MCV RBC AUTO: 89.9 FL (ref 82.6–102.9)
MONOCYTES NFR BLD: 0.87 K/UL (ref 0.1–1.2)
MONOCYTES NFR BLD: 9 % (ref 3–12)
NEUTROPHILS NFR BLD: 65 % (ref 36–65)
NEUTS SEG NFR BLD: 5.94 K/UL (ref 1.5–8.1)
NRBC BLD-RTO: 0 PER 100 WBC
PLATELET # BLD AUTO: 198 K/UL (ref 138–453)
PMV BLD AUTO: 12 FL (ref 8.1–13.5)
POTASSIUM SERPL-SCNC: 4.2 MMOL/L (ref 3.7–5.3)
PROT SERPL-MCNC: 7.3 G/DL (ref 6.6–8.7)
RBC # BLD AUTO: 4.75 M/UL (ref 3.95–5.11)
SODIUM SERPL-SCNC: 135 MMOL/L (ref 136–145)
TROPONIN I SERPL HS-MCNC: 12 NG/L (ref 0–14)
TROPONIN I SERPL HS-MCNC: 12 NG/L (ref 0–14)
WBC OTHER # BLD: 9.3 K/UL (ref 3.5–11.3)

## 2024-10-04 PROCEDURE — 99285 EMERGENCY DEPT VISIT HI MDM: CPT

## 2024-10-04 PROCEDURE — 93005 ELECTROCARDIOGRAM TRACING: CPT

## 2024-10-04 PROCEDURE — 84484 ASSAY OF TROPONIN QUANT: CPT

## 2024-10-04 PROCEDURE — 2580000003 HC RX 258

## 2024-10-04 PROCEDURE — 80053 COMPREHEN METABOLIC PANEL: CPT

## 2024-10-04 PROCEDURE — 71046 X-RAY EXAM CHEST 2 VIEWS: CPT

## 2024-10-04 PROCEDURE — 6360000002 HC RX W HCPCS

## 2024-10-04 PROCEDURE — 82947 ASSAY GLUCOSE BLOOD QUANT: CPT

## 2024-10-04 PROCEDURE — 96375 TX/PRO/DX INJ NEW DRUG ADDON: CPT

## 2024-10-04 PROCEDURE — 96372 THER/PROPH/DIAG INJ SC/IM: CPT

## 2024-10-04 PROCEDURE — 6370000000 HC RX 637 (ALT 250 FOR IP)

## 2024-10-04 PROCEDURE — 94640 AIRWAY INHALATION TREATMENT: CPT

## 2024-10-04 PROCEDURE — 85025 COMPLETE CBC W/AUTO DIFF WBC: CPT

## 2024-10-04 PROCEDURE — G0378 HOSPITAL OBSERVATION PER HR: HCPCS

## 2024-10-04 PROCEDURE — 96374 THER/PROPH/DIAG INJ IV PUSH: CPT

## 2024-10-04 PROCEDURE — 83880 ASSAY OF NATRIURETIC PEPTIDE: CPT

## 2024-10-04 PROCEDURE — 2500000003 HC RX 250 WO HCPCS

## 2024-10-04 RX ORDER — ONDANSETRON 4 MG/1
4 TABLET, ORALLY DISINTEGRATING ORAL EVERY 8 HOURS PRN
Status: DISCONTINUED | OUTPATIENT
Start: 2024-10-04 | End: 2024-10-07 | Stop reason: HOSPADM

## 2024-10-04 RX ORDER — INSULIN GLARGINE 100 [IU]/ML
50 INJECTION, SOLUTION SUBCUTANEOUS EVERY MORNING
Status: DISCONTINUED | OUTPATIENT
Start: 2024-10-05 | End: 2024-10-07 | Stop reason: HOSPADM

## 2024-10-04 RX ORDER — MIDODRINE HYDROCHLORIDE 5 MG/1
2.5 TABLET ORAL
Status: DISCONTINUED | OUTPATIENT
Start: 2024-10-05 | End: 2024-10-07 | Stop reason: HOSPADM

## 2024-10-04 RX ORDER — INSULIN GLARGINE 100 [IU]/ML
40 INJECTION, SOLUTION SUBCUTANEOUS NIGHTLY
Status: DISCONTINUED | OUTPATIENT
Start: 2024-10-04 | End: 2024-10-07 | Stop reason: HOSPADM

## 2024-10-04 RX ORDER — POTASSIUM CHLORIDE 1500 MG/1
40 TABLET, EXTENDED RELEASE ORAL PRN
Status: DISCONTINUED | OUTPATIENT
Start: 2024-10-04 | End: 2024-10-07 | Stop reason: HOSPADM

## 2024-10-04 RX ORDER — OXYCODONE HYDROCHLORIDE 5 MG/1
5 TABLET ORAL ONCE
Status: COMPLETED | OUTPATIENT
Start: 2024-10-04 | End: 2024-10-04

## 2024-10-04 RX ORDER — POLYETHYLENE GLYCOL 3350 17 G/17G
17 POWDER, FOR SOLUTION ORAL DAILY PRN
Status: DISCONTINUED | OUTPATIENT
Start: 2024-10-04 | End: 2024-10-07 | Stop reason: HOSPADM

## 2024-10-04 RX ORDER — SODIUM CHLORIDE 9 MG/ML
INJECTION, SOLUTION INTRAVENOUS PRN
Status: DISCONTINUED | OUTPATIENT
Start: 2024-10-04 | End: 2024-10-07 | Stop reason: HOSPADM

## 2024-10-04 RX ORDER — POTASSIUM CHLORIDE 7.45 MG/ML
10 INJECTION INTRAVENOUS PRN
Status: DISCONTINUED | OUTPATIENT
Start: 2024-10-04 | End: 2024-10-07 | Stop reason: HOSPADM

## 2024-10-04 RX ORDER — OXYCODONE AND ACETAMINOPHEN 5; 325 MG/1; MG/1
1 TABLET ORAL EVERY 8 HOURS PRN
Status: DISCONTINUED | OUTPATIENT
Start: 2024-10-04 | End: 2024-10-07 | Stop reason: HOSPADM

## 2024-10-04 RX ORDER — ACETAMINOPHEN 650 MG/1
650 SUPPOSITORY RECTAL EVERY 6 HOURS PRN
Status: DISCONTINUED | OUTPATIENT
Start: 2024-10-04 | End: 2024-10-07 | Stop reason: HOSPADM

## 2024-10-04 RX ORDER — OXYBUTYNIN CHLORIDE 5 MG/1
5 TABLET, EXTENDED RELEASE ORAL NIGHTLY
Status: DISCONTINUED | OUTPATIENT
Start: 2024-10-04 | End: 2024-10-07 | Stop reason: HOSPADM

## 2024-10-04 RX ORDER — MAGNESIUM SULFATE IN WATER 40 MG/ML
2000 INJECTION, SOLUTION INTRAVENOUS PRN
Status: DISCONTINUED | OUTPATIENT
Start: 2024-10-04 | End: 2024-10-07 | Stop reason: HOSPADM

## 2024-10-04 RX ORDER — IPRATROPIUM BROMIDE AND ALBUTEROL SULFATE 2.5; .5 MG/3ML; MG/3ML
1 SOLUTION RESPIRATORY (INHALATION) EVERY 4 HOURS PRN
Status: DISCONTINUED | OUTPATIENT
Start: 2024-10-04 | End: 2024-10-07 | Stop reason: HOSPADM

## 2024-10-04 RX ORDER — TOPIRAMATE 25 MG/1
100 TABLET, FILM COATED ORAL DAILY
Status: DISCONTINUED | OUTPATIENT
Start: 2024-10-05 | End: 2024-10-07 | Stop reason: HOSPADM

## 2024-10-04 RX ORDER — SODIUM CHLORIDE 0.9 % (FLUSH) 0.9 %
5-40 SYRINGE (ML) INJECTION EVERY 12 HOURS SCHEDULED
Status: DISCONTINUED | OUTPATIENT
Start: 2024-10-04 | End: 2024-10-07 | Stop reason: HOSPADM

## 2024-10-04 RX ORDER — METFORMIN HCL 500 MG
500 TABLET, EXTENDED RELEASE 24 HR ORAL
Status: DISCONTINUED | OUTPATIENT
Start: 2024-10-05 | End: 2024-10-07 | Stop reason: HOSPADM

## 2024-10-04 RX ORDER — SODIUM CHLORIDE 0.9 % (FLUSH) 0.9 %
5-40 SYRINGE (ML) INJECTION PRN
Status: DISCONTINUED | OUTPATIENT
Start: 2024-10-04 | End: 2024-10-07 | Stop reason: HOSPADM

## 2024-10-04 RX ORDER — ENOXAPARIN SODIUM 100 MG/ML
40 INJECTION SUBCUTANEOUS DAILY
Status: DISCONTINUED | OUTPATIENT
Start: 2024-10-04 | End: 2024-10-07 | Stop reason: HOSPADM

## 2024-10-04 RX ORDER — ROSUVASTATIN CALCIUM 20 MG/1
40 TABLET, COATED ORAL NIGHTLY
Status: DISCONTINUED | OUTPATIENT
Start: 2024-10-04 | End: 2024-10-07 | Stop reason: HOSPADM

## 2024-10-04 RX ORDER — BUDESONIDE AND FORMOTEROL FUMARATE DIHYDRATE 80; 4.5 UG/1; UG/1
2 AEROSOL RESPIRATORY (INHALATION)
Status: DISCONTINUED | OUTPATIENT
Start: 2024-10-04 | End: 2024-10-07 | Stop reason: HOSPADM

## 2024-10-04 RX ORDER — AMLODIPINE BESYLATE 2.5 MG/1
2.5 TABLET ORAL DAILY
Status: DISCONTINUED | OUTPATIENT
Start: 2024-10-05 | End: 2024-10-07 | Stop reason: HOSPADM

## 2024-10-04 RX ORDER — BLOOD SUGAR DIAGNOSTIC
STRIP MISCELLANEOUS
Qty: 100 STRIP | Refills: 0 | OUTPATIENT
Start: 2024-10-04

## 2024-10-04 RX ORDER — ONDANSETRON 2 MG/ML
4 INJECTION INTRAMUSCULAR; INTRAVENOUS EVERY 6 HOURS PRN
Status: DISCONTINUED | OUTPATIENT
Start: 2024-10-04 | End: 2024-10-07 | Stop reason: HOSPADM

## 2024-10-04 RX ORDER — ASPIRIN 81 MG/1
81 TABLET ORAL DAILY
Status: DISCONTINUED | OUTPATIENT
Start: 2024-10-05 | End: 2024-10-07 | Stop reason: HOSPADM

## 2024-10-04 RX ORDER — PANTOPRAZOLE SODIUM 40 MG/1
40 TABLET, DELAYED RELEASE ORAL
Status: DISCONTINUED | OUTPATIENT
Start: 2024-10-05 | End: 2024-10-07 | Stop reason: HOSPADM

## 2024-10-04 RX ORDER — ACETAMINOPHEN 325 MG/1
650 TABLET ORAL EVERY 6 HOURS PRN
Status: DISCONTINUED | OUTPATIENT
Start: 2024-10-04 | End: 2024-10-07 | Stop reason: HOSPADM

## 2024-10-04 RX ORDER — METOPROLOL TARTRATE 25 MG/1
12.5 TABLET, FILM COATED ORAL 2 TIMES DAILY
Status: DISCONTINUED | OUTPATIENT
Start: 2024-10-04 | End: 2024-10-07 | Stop reason: HOSPADM

## 2024-10-04 RX ORDER — ESCITALOPRAM OXALATE 10 MG/1
20 TABLET ORAL DAILY
Status: DISCONTINUED | OUTPATIENT
Start: 2024-10-05 | End: 2024-10-07 | Stop reason: HOSPADM

## 2024-10-04 RX ORDER — ISOSORBIDE MONONITRATE 30 MG/1
30 TABLET, EXTENDED RELEASE ORAL DAILY
Status: DISCONTINUED | OUTPATIENT
Start: 2024-10-05 | End: 2024-10-05

## 2024-10-04 RX ADMIN — TICAGRELOR 90 MG: 90 TABLET ORAL at 22:49

## 2024-10-04 RX ADMIN — ROSUVASTATIN CALCIUM 40 MG: 20 TABLET, FILM COATED ORAL at 22:47

## 2024-10-04 RX ADMIN — INSULIN GLARGINE 40 UNITS: 100 INJECTION, SOLUTION SUBCUTANEOUS at 22:48

## 2024-10-04 RX ADMIN — OXYCODONE 5 MG: 5 TABLET ORAL at 16:20

## 2024-10-04 RX ADMIN — METOPROLOL TARTRATE 12.5 MG: 25 TABLET, FILM COATED ORAL at 22:48

## 2024-10-04 RX ADMIN — ENOXAPARIN SODIUM 40 MG: 100 INJECTION SUBCUTANEOUS at 17:58

## 2024-10-04 RX ADMIN — BUDESONIDE AND FORMOTEROL FUMARATE DIHYDRATE 2 PUFF: 80; 4.5 AEROSOL RESPIRATORY (INHALATION) at 21:26

## 2024-10-04 RX ADMIN — SODIUM CHLORIDE, PRESERVATIVE FREE 10 ML: 5 INJECTION INTRAVENOUS at 20:56

## 2024-10-04 RX ADMIN — OXYBUTYNIN CHLORIDE 5 MG: 5 TABLET, EXTENDED RELEASE ORAL at 22:49

## 2024-10-04 RX ADMIN — FAMOTIDINE 20 MG: 10 INJECTION, SOLUTION INTRAVENOUS at 17:58

## 2024-10-04 RX ADMIN — HYDROMORPHONE HYDROCHLORIDE 0.5 MG: 1 INJECTION, SOLUTION INTRAMUSCULAR; INTRAVENOUS; SUBCUTANEOUS at 17:58

## 2024-10-04 ASSESSMENT — PAIN DESCRIPTION - ORIENTATION
ORIENTATION: MID

## 2024-10-04 ASSESSMENT — PAIN DESCRIPTION - DESCRIPTORS
DESCRIPTORS: ACHING
DESCRIPTORS: HEAVINESS
DESCRIPTORS: HEAVINESS

## 2024-10-04 ASSESSMENT — PAIN DESCRIPTION - LOCATION
LOCATION: CHEST

## 2024-10-04 ASSESSMENT — PAIN SCALES - GENERAL
PAINLEVEL_OUTOF10: 8
PAINLEVEL_OUTOF10: 6
PAINLEVEL_OUTOF10: 8
PAINLEVEL_OUTOF10: 7

## 2024-10-04 NOTE — TELEPHONE ENCOUNTER
she has mealtime insulin per sliding scale of NovoLog: IF<139 NO INSULIN; 140-199-4 UN;200-249-6 UN;250-299-8 UN;300-349-10 UN;350-400=12 UN;ABOVE 400-14 UNIT(S) in addition to a base dose of 5 units each meal.  So she will take 5 units each meal plus sliding scale dosage based on fingerstick    Additionally taking Lantus basal insulin 50 units in the morning and 40 at bedtime based on medication list.

## 2024-10-04 NOTE — ED NOTES
Pt placed on purewick to void.   
Pt presents to ED room 25 with c/o chest pain. Pt states the chest pain started around 10am this morning. Pt received nitro and 324 of aspirin PTA. Pain rated an 8/10 on the pain scale. Pt has hx of stent placement following and MI. Pt also has a hx of asthma and COPD. Pt maintained at 94% on 2L/NC. No acute distress noted. Personal items and call light within reach. Monitor applied. Will continue with plan of care.   
Pt resting in bed with personal items and call light within reach. No acute distress noted. Resp even and non labored. Monitor continued. Will continue with plan of care.    
The following labs were labeled with appropriate pt sticker and tubed to lab:     [] Blue     [] Lavender   [] on ice  [x] Green/yellow  [] Green/black [] on ice  [] Grey  [] on ice  [] Yellow  [] Red  [] Pink  [] Type/ Screen  [] ABG  [] VBG    [] COVID-19 swab    [] Rapid  [] PCR  [] Flu swab  [] Peds Viral Panel     [] Urine Sample  [] Fecal Sample  [] Pelvic Cultures  [] Blood Cultures  [] X 2  [] STREP Cultures  [] Wound Cultures   
0.5 TABLET BY MOUTH TWO TIMES A DAY FOR HTN    MIDODRINE (PROAMATINE) 2.5 MG TABLET    GIVE 1 TABLET BY MOUTH WITH MEALS FOR HYPOTENSION ** HOLD IF SBP IS GREATER THAN 110    OXYBUTYNIN (DITROPAN-XL) 5 MG EXTENDED RELEASE TABLET    GIVE 1 TABLET BY MOUTH ONE TIME A DAY FOR BLADDER SPASM    OXYCODONE-ACETAMINOPHEN (PERCOCET) 5-325 MG PER TABLET    Take 1 tablet by mouth every 8 hours as needed for Pain for up to 30 days. Intended supply: 30 days.    OXYGEN    Inhale 3 L into the lungs     PANTOPRAZOLE (PROTONIX) 40 MG TABLET    GIVE 1 TABLET BY MOUTH TWO TIMES A DAY FOR INDIGESTION    ROSUVASTATIN (CRESTOR) 40 MG TABLET    TAKE 1 TABLET BY MOUTH NIGHTLY    TICAGRELOR (BRILINTA) 90 MG TABS TABLET    TAKE 1 TABLET BY MOUTH 2 TIMES DAILY    TOPIRAMATE (TOPAMAX) 100 MG TABLET    GIVE 1 TABLET BY MOUTH ONE TIME A DAY FOR SEIZURES     Orders Placed This Encounter   Medications    oxyCODONE (ROXICODONE) immediate release tablet 5 mg       SURGICAL HISTORY       Past Surgical History:   Procedure Laterality Date    ABLATION OF DYSRHYTHMIC FOCUS  2000    CARDIAC PROCEDURE N/A 2/1/2024    klarissa / Left heart cath / coronary angiography / stemi er 22 performed by Victoriano Dee MD at [facility] CARDIAC CATH LAB    CARDIAC PROCEDURE N/A 3/11/2024    klarissa / Percutaneous coronary intervention performed by Victoriano Dee MD at [facility] CARDIAC CATH LAB    CARPAL TUNNEL RELEASE Right     CATARACT REMOVAL WITH IMPLANT Bilateral     CHOLECYSTECTOMY  2007    COLONOSCOPY      COLONOSCOPY  04/10/2017    tubular adenomo polyp , mod. sigmoid diverticulosis, min. int. hemorrhoids    COLONOSCOPY N/A 3/2/2021    COLONOSCOPY WITH BIOPSY performed by Moris Brenner MD at [facility] ENDO    CYSTOSCOPY  9/25/2013    DILATION AND CURETTAGE  1979    EYE SURGERY      laser    INCONTINENCE SURGERY      Percutaneous nerve stimulation Dr Augie Amaya 2013     INSERTABLE CARDIAC MONITOR  12/04/2015    AVIcodeTRONIC REVEAL LINQ MODEL #[id number] MRI CONDTIONAL OK 3T.

## 2024-10-04 NOTE — ED PROVIDER NOTES
[facility] ED  Emergency Department Encounter  Emergency Medicine Resident     Pt Name:Mariangel Abreu  MRN: [id number]  Birthdate 1950  Date of evaluation: 10/4/24  PCP:  Carla Chua APRN - CNP  Note Started: 3:20 PM EDT      CHIEF COMPLAINT       Chief Complaint   Patient presents with    Chest Pain       HISTORY OF PRESENT ILLNESS  (Location/Symptom, Timing/Onset, Context/Setting, Quality, Duration, Modifying Factors, Severity.)      Mariangel Abreu is a 73 y.o. female history of COPD on home oxygen, CAD with 4 stents in place, diabetes, hypertension, CHF, hyperlipidemia who presents with chest pain that began at 10 AM this morning.  Patient states she was cleaning her house when she began having a pressure in the center of her chest.  Patient took a nitro at home with no relief of pain.  She continued on with her day but continued to have central chest pain.  States she began to feel short of breath she had associated nausea and diaphoresis.  Patient called EMS who gave her aspirin and nitroglycerin with no relief of pain.  States she continues to have the pain right now feels like a nonradiating pressure.  No recent illness including fevers or chills, no significant swelling of her legs.    PAST MEDICAL / SURGICAL / SOCIAL / FAMILY HISTORY      has a past medical history of Abdominal bloating, Adenomatous polyp of ascending colon, Allergic rhinitis, Anxiety, Arthritis, Asthma, Atrial fibrillation ([facility]), Back pain, Benign hypertension with CKD (chronic kidney disease) stage III ([facility]), CAD (coronary artery disease), Caffeine use, CHF (congestive heart failure) ([facility]), Chronic kidney disease, CKD (chronic kidney disease) stage 3, GFR 30-59 ml/min ([facility]), Claudication ([facility]), COPD (chronic obstructive pulmonary disease) ([facility]), Degeneration of lumbar or lumbosacral intervertebral disc, Depression, DM (diabetes mellitus) ([facility]), Emphysema of lung ([facility]), Gastritis, GERD

## 2024-10-04 NOTE — ED PROVIDER NOTES
[facility] ED     Emergency Department     Faculty Attestation        I performed a history and physical examination of the patient and discussed management with the resident. I reviewed the resident’s note and agree with the documented findings and plan of care. Any areas of disagreement are noted on the chart. I was personally present for the key portions of any procedures. I have documented in the chart those procedures where I was not present during the key portions. I have reviewed the emergency nurses triage note. I agree with the chief complaint, past medical history, past surgical history, allergies, medications, social and family history as documented unless otherwise noted below.    For mid-level providers such as nurse practitioners as well as physicians assistants:    I have personally seen and evaluated the patient.    I find the patient's history and physical exam are consistent with NP/PA documentation.  I agree with the care provided, treatment rendered, disposition, & follow-up plan.     Additional findings are as noted.    Vital Signs: /74   Pulse 74   Temp 98.4 °F (36.9 °C)   Resp 18   LMP  (LMP Unknown)   SpO2 93%   PCP:  Carla Chua, APRN - CNP    Pertinent Comments:     Patient presents with chest pain she has a history of CAD with stenting earlier this year she states she has substernal nonrate and chest pressure that is reminiscent of the previous time this year when she required stenting.  No cough fevers chills or infectious symptoms.  Pain does not radiate no numbness tingling or any neurological deficits.  She is awake alert oriented otherwise hemodynamically stable will check cardiac labs EKG chest x-ray, dissipate admission.      Critical Care  None          Leonid Flores MD    Attending Emergency Medicine Physician            Dominick Flores MD  10/04/24 1994

## 2024-10-05 ENCOUNTER — APPOINTMENT (OUTPATIENT)
Age: 74
End: 2024-10-05
Payer: MEDICARE

## 2024-10-05 LAB
GLUCOSE BLD-MCNC: 365 MG/DL (ref 65–105)
GLUCOSE BLD-MCNC: 367 MG/DL (ref 65–105)

## 2024-10-05 PROCEDURE — 94640 AIRWAY INHALATION TREATMENT: CPT

## 2024-10-05 PROCEDURE — 6370000000 HC RX 637 (ALT 250 FOR IP)

## 2024-10-05 PROCEDURE — 93306 TTE W/DOPPLER COMPLETE: CPT | Performed by: INTERNAL MEDICINE

## 2024-10-05 PROCEDURE — 96372 THER/PROPH/DIAG INJ SC/IM: CPT

## 2024-10-05 PROCEDURE — 6370000000 HC RX 637 (ALT 250 FOR IP): Performed by: STUDENT IN AN ORGANIZED HEALTH CARE EDUCATION/TRAINING PROGRAM

## 2024-10-05 PROCEDURE — 2700000000 HC OXYGEN THERAPY PER DAY

## 2024-10-05 PROCEDURE — 82947 ASSAY GLUCOSE BLOOD QUANT: CPT

## 2024-10-05 PROCEDURE — G0378 HOSPITAL OBSERVATION PER HR: HCPCS

## 2024-10-05 PROCEDURE — 6360000002 HC RX W HCPCS

## 2024-10-05 PROCEDURE — 93306 TTE W/DOPPLER COMPLETE: CPT

## 2024-10-05 PROCEDURE — 2580000003 HC RX 258

## 2024-10-05 PROCEDURE — 99222 1ST HOSP IP/OBS MODERATE 55: CPT | Performed by: INTERNAL MEDICINE

## 2024-10-05 RX ORDER — ISOSORBIDE MONONITRATE 30 MG/1
60 TABLET, EXTENDED RELEASE ORAL DAILY
Status: DISCONTINUED | OUTPATIENT
Start: 2024-10-06 | End: 2024-10-07 | Stop reason: HOSPADM

## 2024-10-05 RX ORDER — RANOLAZINE 500 MG/1
500 TABLET, EXTENDED RELEASE ORAL 2 TIMES DAILY
Status: DISCONTINUED | OUTPATIENT
Start: 2024-10-05 | End: 2024-10-07 | Stop reason: HOSPADM

## 2024-10-05 RX ORDER — GLUCAGON 1 MG/ML
1 KIT INJECTION PRN
Status: DISCONTINUED | OUTPATIENT
Start: 2024-10-05 | End: 2024-10-07 | Stop reason: HOSPADM

## 2024-10-05 RX ORDER — DEXTROSE MONOHYDRATE 100 MG/ML
INJECTION, SOLUTION INTRAVENOUS CONTINUOUS PRN
Status: DISCONTINUED | OUTPATIENT
Start: 2024-10-05 | End: 2024-10-07 | Stop reason: HOSPADM

## 2024-10-05 RX ORDER — INSULIN LISPRO 100 [IU]/ML
0-4 INJECTION, SOLUTION INTRAVENOUS; SUBCUTANEOUS NIGHTLY
Status: DISCONTINUED | OUTPATIENT
Start: 2024-10-05 | End: 2024-10-07 | Stop reason: HOSPADM

## 2024-10-05 RX ORDER — INSULIN LISPRO 100 [IU]/ML
0-8 INJECTION, SOLUTION INTRAVENOUS; SUBCUTANEOUS
Status: DISCONTINUED | OUTPATIENT
Start: 2024-10-05 | End: 2024-10-07 | Stop reason: HOSPADM

## 2024-10-05 RX ADMIN — PANTOPRAZOLE SODIUM 40 MG: 40 TABLET, DELAYED RELEASE ORAL at 17:18

## 2024-10-05 RX ADMIN — ENOXAPARIN SODIUM 40 MG: 100 INJECTION SUBCUTANEOUS at 10:10

## 2024-10-05 RX ADMIN — BUDESONIDE AND FORMOTEROL FUMARATE DIHYDRATE 2 PUFF: 80; 4.5 AEROSOL RESPIRATORY (INHALATION) at 08:57

## 2024-10-05 RX ADMIN — METOPROLOL TARTRATE 12.5 MG: 25 TABLET, FILM COATED ORAL at 20:52

## 2024-10-05 RX ADMIN — ASPIRIN 81 MG: 81 TABLET, COATED ORAL at 10:09

## 2024-10-05 RX ADMIN — SODIUM CHLORIDE, PRESERVATIVE FREE 10 ML: 5 INJECTION INTRAVENOUS at 20:55

## 2024-10-05 RX ADMIN — ESCITALOPRAM OXALATE 20 MG: 10 TABLET ORAL at 10:09

## 2024-10-05 RX ADMIN — RANOLAZINE 500 MG: 500 TABLET, FILM COATED, EXTENDED RELEASE ORAL at 20:53

## 2024-10-05 RX ADMIN — RANOLAZINE 500 MG: 500 TABLET, FILM COATED, EXTENDED RELEASE ORAL at 10:08

## 2024-10-05 RX ADMIN — OXYCODONE HYDROCHLORIDE AND ACETAMINOPHEN 1 TABLET: 5; 325 TABLET ORAL at 20:53

## 2024-10-05 RX ADMIN — Medication 5 MG: at 22:55

## 2024-10-05 RX ADMIN — BUDESONIDE AND FORMOTEROL FUMARATE DIHYDRATE 2 PUFF: 80; 4.5 AEROSOL RESPIRATORY (INHALATION) at 20:10

## 2024-10-05 RX ADMIN — OXYCODONE HYDROCHLORIDE AND ACETAMINOPHEN 1 TABLET: 5; 325 TABLET ORAL at 02:26

## 2024-10-05 RX ADMIN — TICAGRELOR 90 MG: 90 TABLET ORAL at 20:53

## 2024-10-05 RX ADMIN — INSULIN LISPRO 4 UNITS: 100 INJECTION, SOLUTION INTRAVENOUS; SUBCUTANEOUS at 20:51

## 2024-10-05 RX ADMIN — MIDODRINE HYDROCHLORIDE 2.5 MG: 5 TABLET ORAL at 10:08

## 2024-10-05 RX ADMIN — ACETAMINOPHEN 650 MG: 325 TABLET ORAL at 20:54

## 2024-10-05 RX ADMIN — PANTOPRAZOLE SODIUM 40 MG: 40 TABLET, DELAYED RELEASE ORAL at 10:09

## 2024-10-05 RX ADMIN — TICAGRELOR 90 MG: 90 TABLET ORAL at 10:08

## 2024-10-05 RX ADMIN — ROSUVASTATIN CALCIUM 40 MG: 20 TABLET, FILM COATED ORAL at 20:53

## 2024-10-05 RX ADMIN — INSULIN GLARGINE 50 UNITS: 100 INJECTION, SOLUTION SUBCUTANEOUS at 10:10

## 2024-10-05 RX ADMIN — OXYBUTYNIN CHLORIDE 5 MG: 5 TABLET, EXTENDED RELEASE ORAL at 20:52

## 2024-10-05 RX ADMIN — SODIUM CHLORIDE, PRESERVATIVE FREE 10 ML: 5 INJECTION INTRAVENOUS at 10:11

## 2024-10-05 RX ADMIN — OXYCODONE HYDROCHLORIDE AND ACETAMINOPHEN 1 TABLET: 5; 325 TABLET ORAL at 10:15

## 2024-10-05 RX ADMIN — INSULIN GLARGINE 40 UNITS: 100 INJECTION, SOLUTION SUBCUTANEOUS at 20:52

## 2024-10-05 RX ADMIN — AMLODIPINE BESYLATE 2.5 MG: 5 TABLET ORAL at 10:10

## 2024-10-05 RX ADMIN — ACETAMINOPHEN 650 MG: 325 TABLET ORAL at 02:26

## 2024-10-05 RX ADMIN — METFORMIN HYDROCHLORIDE 500 MG: 500 TABLET, EXTENDED RELEASE ORAL at 10:09

## 2024-10-05 RX ADMIN — INSULIN LISPRO 8 UNITS: 100 INJECTION, SOLUTION INTRAVENOUS; SUBCUTANEOUS at 17:19

## 2024-10-05 RX ADMIN — TOPIRAMATE 100 MG: 25 TABLET, FILM COATED ORAL at 10:08

## 2024-10-05 ASSESSMENT — PAIN SCALES - WONG BAKER
WONGBAKER_NUMERICALRESPONSE: NO HURT

## 2024-10-05 ASSESSMENT — PAIN SCALES - GENERAL
PAINLEVEL_OUTOF10: 0
PAINLEVEL_OUTOF10: 10
PAINLEVEL_OUTOF10: 6
PAINLEVEL_OUTOF10: 0
PAINLEVEL_OUTOF10: 7

## 2024-10-05 ASSESSMENT — PAIN DESCRIPTION - LOCATION
LOCATION: CHEST
LOCATION: CHEST

## 2024-10-05 ASSESSMENT — PAIN DESCRIPTION - DESCRIPTORS: DESCRIPTORS: ACHING

## 2024-10-05 NOTE — CONSULTS
[facility]   Consult Note         Today's Date: 10/5/2024  Patient Name: Mariangel Abreu  Date of admission: 10/4/2024  3:14 PM  Patient's age: 73 y.o., 1950  Admission Dx: Chest pain [R07.9]  Chest pain, unspecified type [R07.9]    Reason for Consult:  Cardiac evaluation    Requesting Physician: David Gleason MD    REASON FOR CONSULT: Chest pain    History Obtained From:  Patient, chart, staff, records    HISTORY OF PRESENT ILLNESS:      73-year-old female with past medical history of CAD status post staged PCI  in February and March 2024 with stents placed in mid RCA, proximal and mid LAD.  History of hypertension, hyperlipidemia, type 2 diabetes mellitus, TIA, COPD/HIGINIO coming into the hospital due to chest pain.    Patient states that she was cleaning her house when she experienced a pressure-like sensation in the middle of chest.  Patient took a nitro at home with no relief.  She continued on with her day but continued to have this mild dull central chest pain.  Later that day, patient became short of breath and was having nausea and diaphoresis that she called EMS who gave her aspirin and nitroglycerin again with no relief.  Denies any recent fever chills or flulike symptoms.      Patient takes aspirin-Brilinta-statin Lopressor, amlodipine at home and reports compliance.    High-sensitivity troponins are negative 12x2  EKG did not show any signs of ischemia.     Echo 2/2024  EF of 50-55% with normal diastolic function.        Past Medical History:   has a past medical history of Abdominal bloating, Adenomatous polyp of ascending colon, Allergic rhinitis, Anxiety, Arthritis, Asthma, Atrial fibrillation ([facility]), Back pain, Benign hypertension with CKD (chronic kidney disease) stage III ([facility]), CAD (coronary artery disease), Caffeine use, CHF (congestive heart failure) ([facility]), Chronic kidney disease, CKD (chronic kidney disease) stage 3, GFR 30-59 ml/min ([facility]), Claudication ([facility]), COPD

## 2024-10-05 NOTE — H&P
(DITROPAN-XL) extended release tablet 5 mg, Nightly  midodrine (PROAMATINE) tablet 2.5 mg, TID WC  metoprolol tartrate (LOPRESSOR) tablet 12.5 mg, BID  metFORMIN (GLUCOPHAGE-XR) extended release tablet 500 mg, Daily with breakfast  ipratropium 0.5 mg-albuterol 2.5 mg (DUONEB) nebulizer solution 1 Dose, Q4H PRN  insulin glargine (LANTUS) injection vial 50 Units, QAM   And  insulin glargine (LANTUS) injection vial 40 Units, Nightly  budesonide-formoterol (SYMBICORT) 80-4.5 MCG/ACT inhaler 2 puff, BID RT  escitalopram (LEXAPRO) tablet 20 mg, Daily  aspirin EC tablet 81 mg, Daily  amLODIPine (NORVASC) tablet 2.5 mg, Daily        All medication charted and reviewed.    ALLERGIES     is allergic to robitussin [guaifenesin], codeine, compazine [prochlorperazine maleate], fentanyl, iodides, morphine, moxifloxacin, reglan [metoclopramide], avelox [moxifloxacin hcl in nacl], cipro xr, ofloxacin, and sulfa antibiotics.      FAMILY HISTORY     She indicated that her mother is . She indicated that her father is . She indicated that her sister is . She indicated that the status of her maternal grandmother is unknown. She indicated that her maternal aunt is .     family history includes Breast Cancer in her maternal aunt; Cancer in her father; Diabetes in her maternal grandmother and mother; Emphysema in her sister; Heart Disease in her mother; Stomach Cancer in her father.  The patient denies any pertinent family history.  I have reviewed and agree with the family history entered.  I have reviewed the Family History and it is not significant to the case    SOCIAL HISTORY      reports that she quit smoking about 24 years ago. Her smoking use included cigarettes. She started smoking about 65 years ago. She has a 123 pack-year smoking history. She has never used smokeless tobacco. She reports that she does not drink alcohol and does not use drugs.  I have reviewed and agree with all Social.  There are

## 2024-10-06 LAB
ECHO AO ASC DIAM: 3.1 CM
ECHO AO ASCENDING AORTA INDEX: 1.53 CM/M2
ECHO AO ROOT DIAM: 2.5 CM
ECHO AO ROOT INDEX: 1.24 CM/M2
ECHO AV AREA PEAK VELOCITY: 2.4 CM2
ECHO AV AREA VTI: 2.2 CM2
ECHO AV AREA/BSA PEAK VELOCITY: 1.2 CM2/M2
ECHO AV AREA/BSA VTI: 1.1 CM2/M2
ECHO AV MEAN GRADIENT: 4 MMHG
ECHO AV MEAN VELOCITY: 0.9 M/S
ECHO AV PEAK GRADIENT: 8 MMHG
ECHO AV PEAK VELOCITY: 1.4 M/S
ECHO AV VELOCITY RATIO: 0.79
ECHO AV VTI: 32.3 CM
ECHO BSA: 2.13 M2
ECHO IVC PROX: 1.6 CM
ECHO LA AREA 2C: 15.4 CM2
ECHO LA AREA 4C: 8.5 CM2
ECHO LA DIAMETER INDEX: 1.88 CM/M2
ECHO LA DIAMETER: 3.8 CM
ECHO LA MAJOR AXIS: 3.4 CM
ECHO LA MINOR AXIS: 5 CM
ECHO LA TO AORTIC ROOT RATIO: 1.52
ECHO LA VOL MOD A2C: 39 ML (ref 22–52)
ECHO LA VOL MOD A4C: 18 ML (ref 22–52)
ECHO LA VOLUME INDEX MOD A2C: 19 ML/M2 (ref 16–34)
ECHO LA VOLUME INDEX MOD A4C: 9 ML/M2 (ref 16–34)
ECHO LV E' LATERAL VELOCITY: 8.5 CM/S
ECHO LV E' SEPTAL VELOCITY: 7.8 CM/S
ECHO LV EDV A2C: 52 ML
ECHO LV EDV A4C: 65 ML
ECHO LV EDV INDEX A4C: 32 ML/M2
ECHO LV EDV NDEX A2C: 26 ML/M2
ECHO LV EJECTION FRACTION A2C: 49 %
ECHO LV EJECTION FRACTION A4C: 49 %
ECHO LV EJECTION FRACTION BIPLANE: 51 % (ref 55–100)
ECHO LV ESV A2C: 27 ML
ECHO LV ESV A4C: 33 ML
ECHO LV ESV INDEX A2C: 13 ML/M2
ECHO LV ESV INDEX A4C: 16 ML/M2
ECHO LV FRACTIONAL SHORTENING: 9 % (ref 28–44)
ECHO LV INTERNAL DIMENSION DIASTOLE INDEX: 2.67 CM/M2
ECHO LV INTERNAL DIMENSION DIASTOLIC: 5.4 CM (ref 3.9–5.3)
ECHO LV INTERNAL DIMENSION SYSTOLIC INDEX: 2.43 CM/M2
ECHO LV INTERNAL DIMENSION SYSTOLIC: 4.9 CM
ECHO LV IVSD: 1 CM (ref 0.6–0.9)
ECHO LV MASS 2D: 206.7 G (ref 67–162)
ECHO LV MASS INDEX 2D: 102.3 G/M2 (ref 43–95)
ECHO LV POSTERIOR WALL DIASTOLIC: 1 CM (ref 0.6–0.9)
ECHO LV RELATIVE WALL THICKNESS RATIO: 0.37
ECHO LVOT AREA: 3.1 CM2
ECHO LVOT AV VTI INDEX: 0.7
ECHO LVOT DIAM: 2 CM
ECHO LVOT MEAN GRADIENT: 2 MMHG
ECHO LVOT PEAK GRADIENT: 5 MMHG
ECHO LVOT PEAK VELOCITY: 1.1 M/S
ECHO LVOT STROKE VOLUME INDEX: 35.1 ML/M2
ECHO LVOT SV: 71 ML
ECHO LVOT VTI: 22.6 CM
ECHO MV A VELOCITY: 0.8 M/S
ECHO MV AREA VTI: 2.1 CM2
ECHO MV E DECELERATION TIME (DT): 242 MS
ECHO MV E VELOCITY: 0.96 M/S
ECHO MV E/A RATIO: 1.2
ECHO MV E/E' LATERAL: 11.29
ECHO MV E/E' RATIO (AVERAGED): 11.8
ECHO MV E/E' SEPTAL: 12.31
ECHO MV LVOT VTI INDEX: 1.51
ECHO MV MAX VELOCITY: 0.9 M/S
ECHO MV MEAN GRADIENT: 2 MMHG
ECHO MV MEAN VELOCITY: 0.6 M/S
ECHO MV PEAK GRADIENT: 3 MMHG
ECHO MV VTI: 34.1 CM
ECHO RV FREE WALL PEAK S': 11.5 CM/S
ECHO RV TAPSE: 2.3 CM (ref 1.7–?)
ECHO TV REGURGITANT MAX VELOCITY: 0.91 M/S
GLUCOSE BLD-MCNC: 247 MG/DL (ref 65–105)
GLUCOSE BLD-MCNC: 293 MG/DL (ref 65–105)
GLUCOSE BLD-MCNC: 315 MG/DL (ref 65–105)
GLUCOSE BLD-MCNC: 329 MG/DL (ref 65–105)

## 2024-10-06 PROCEDURE — 6370000000 HC RX 637 (ALT 250 FOR IP)

## 2024-10-06 PROCEDURE — 96372 THER/PROPH/DIAG INJ SC/IM: CPT

## 2024-10-06 PROCEDURE — 2700000000 HC OXYGEN THERAPY PER DAY

## 2024-10-06 PROCEDURE — 6360000002 HC RX W HCPCS

## 2024-10-06 PROCEDURE — 94640 AIRWAY INHALATION TREATMENT: CPT

## 2024-10-06 PROCEDURE — G0378 HOSPITAL OBSERVATION PER HR: HCPCS

## 2024-10-06 PROCEDURE — 82947 ASSAY GLUCOSE BLOOD QUANT: CPT

## 2024-10-06 PROCEDURE — 6370000000 HC RX 637 (ALT 250 FOR IP): Performed by: STUDENT IN AN ORGANIZED HEALTH CARE EDUCATION/TRAINING PROGRAM

## 2024-10-06 PROCEDURE — 99233 SBSQ HOSP IP/OBS HIGH 50: CPT | Performed by: NURSE PRACTITIONER

## 2024-10-06 PROCEDURE — 2580000003 HC RX 258

## 2024-10-06 RX ADMIN — SODIUM CHLORIDE, PRESERVATIVE FREE 10 ML: 5 INJECTION INTRAVENOUS at 20:57

## 2024-10-06 RX ADMIN — SODIUM CHLORIDE, PRESERVATIVE FREE 10 ML: 5 INJECTION INTRAVENOUS at 08:20

## 2024-10-06 RX ADMIN — BUDESONIDE AND FORMOTEROL FUMARATE DIHYDRATE 2 PUFF: 80; 4.5 AEROSOL RESPIRATORY (INHALATION) at 21:51

## 2024-10-06 RX ADMIN — OXYBUTYNIN CHLORIDE 5 MG: 5 TABLET, EXTENDED RELEASE ORAL at 20:32

## 2024-10-06 RX ADMIN — INSULIN LISPRO 4 UNITS: 100 INJECTION, SOLUTION INTRAVENOUS; SUBCUTANEOUS at 08:23

## 2024-10-06 RX ADMIN — INSULIN LISPRO 6 UNITS: 100 INJECTION, SOLUTION INTRAVENOUS; SUBCUTANEOUS at 11:45

## 2024-10-06 RX ADMIN — INSULIN GLARGINE 50 UNITS: 100 INJECTION, SOLUTION SUBCUTANEOUS at 08:23

## 2024-10-06 RX ADMIN — ENOXAPARIN SODIUM 40 MG: 100 INJECTION SUBCUTANEOUS at 08:19

## 2024-10-06 RX ADMIN — AMLODIPINE BESYLATE 2.5 MG: 5 TABLET ORAL at 11:43

## 2024-10-06 RX ADMIN — ESCITALOPRAM OXALATE 20 MG: 10 TABLET ORAL at 08:18

## 2024-10-06 RX ADMIN — OXYCODONE HYDROCHLORIDE AND ACETAMINOPHEN 1 TABLET: 5; 325 TABLET ORAL at 05:15

## 2024-10-06 RX ADMIN — Medication 5 MG: at 20:32

## 2024-10-06 RX ADMIN — MIDODRINE HYDROCHLORIDE 2.5 MG: 5 TABLET ORAL at 08:18

## 2024-10-06 RX ADMIN — TOPIRAMATE 100 MG: 25 TABLET, FILM COATED ORAL at 08:18

## 2024-10-06 RX ADMIN — METFORMIN HYDROCHLORIDE 500 MG: 500 TABLET, EXTENDED RELEASE ORAL at 08:19

## 2024-10-06 RX ADMIN — PANTOPRAZOLE SODIUM 40 MG: 40 TABLET, DELAYED RELEASE ORAL at 08:18

## 2024-10-06 RX ADMIN — MIDODRINE HYDROCHLORIDE 2.5 MG: 5 TABLET ORAL at 11:43

## 2024-10-06 RX ADMIN — OXYCODONE HYDROCHLORIDE AND ACETAMINOPHEN 1 TABLET: 5; 325 TABLET ORAL at 15:31

## 2024-10-06 RX ADMIN — METOPROLOL TARTRATE 12.5 MG: 25 TABLET, FILM COATED ORAL at 20:32

## 2024-10-06 RX ADMIN — INSULIN LISPRO 4 UNITS: 100 INJECTION, SOLUTION INTRAVENOUS; SUBCUTANEOUS at 17:13

## 2024-10-06 RX ADMIN — PANTOPRAZOLE SODIUM 40 MG: 40 TABLET, DELAYED RELEASE ORAL at 15:25

## 2024-10-06 RX ADMIN — RANOLAZINE 500 MG: 500 TABLET, FILM COATED, EXTENDED RELEASE ORAL at 11:42

## 2024-10-06 RX ADMIN — RANOLAZINE 500 MG: 500 TABLET, FILM COATED, EXTENDED RELEASE ORAL at 20:32

## 2024-10-06 RX ADMIN — MIDODRINE HYDROCHLORIDE 2.5 MG: 5 TABLET ORAL at 15:26

## 2024-10-06 RX ADMIN — METOPROLOL TARTRATE 12.5 MG: 25 TABLET, FILM COATED ORAL at 08:19

## 2024-10-06 RX ADMIN — TICAGRELOR 90 MG: 90 TABLET ORAL at 08:18

## 2024-10-06 RX ADMIN — INSULIN LISPRO 4 UNITS: 100 INJECTION, SOLUTION INTRAVENOUS; SUBCUTANEOUS at 20:31

## 2024-10-06 RX ADMIN — BUDESONIDE AND FORMOTEROL FUMARATE DIHYDRATE 2 PUFF: 80; 4.5 AEROSOL RESPIRATORY (INHALATION) at 08:32

## 2024-10-06 RX ADMIN — INSULIN GLARGINE 40 UNITS: 100 INJECTION, SOLUTION SUBCUTANEOUS at 20:31

## 2024-10-06 RX ADMIN — TICAGRELOR 90 MG: 90 TABLET ORAL at 20:32

## 2024-10-06 RX ADMIN — ROSUVASTATIN CALCIUM 40 MG: 20 TABLET, FILM COATED ORAL at 20:33

## 2024-10-06 RX ADMIN — ISOSORBIDE MONONITRATE 60 MG: 30 TABLET, EXTENDED RELEASE ORAL at 11:42

## 2024-10-06 RX ADMIN — ACETAMINOPHEN 650 MG: 325 TABLET ORAL at 20:33

## 2024-10-06 RX ADMIN — ASPIRIN 81 MG: 81 TABLET, COATED ORAL at 08:18

## 2024-10-06 ASSESSMENT — PAIN SCALES - GENERAL
PAINLEVEL_OUTOF10: 5
PAINLEVEL_OUTOF10: 7
PAINLEVEL_OUTOF10: 0
PAINLEVEL_OUTOF10: 6
PAINLEVEL_OUTOF10: 7
PAINLEVEL_OUTOF10: 6

## 2024-10-06 ASSESSMENT — PAIN DESCRIPTION - DESCRIPTORS
DESCRIPTORS: ACHING

## 2024-10-06 ASSESSMENT — PAIN DESCRIPTION - LOCATION
LOCATION: CHEST

## 2024-10-06 ASSESSMENT — PAIN SCALES - WONG BAKER
WONGBAKER_NUMERICALRESPONSE: NO HURT

## 2024-10-06 ASSESSMENT — PAIN DESCRIPTION - ORIENTATION
ORIENTATION: MID
ORIENTATION: MID

## 2024-10-06 NOTE — PLAN OF CARE
Problem: Discharge Planning  Goal: Discharge to home or other facility with appropriate resources  10/6/2024 1913 by Bryce Roy, RN  Outcome: Progressing  10/6/2024 1827 by Anna Medina RN  Outcome: Progressing  Flowsheets (Taken 10/6/2024 0800)  Discharge to home or other facility with appropriate resources:   Identify barriers to discharge with patient and caregiver   Arrange for needed discharge resources and transportation as appropriate   Identify discharge learning needs (meds, wound care, etc)     Problem: Pain  Goal: Verbalizes/displays adequate comfort level or baseline comfort level  10/6/2024 1913 by Bryce Roy, RN  Outcome: Progressing  10/6/2024 1827 by Anna Medina RN  Outcome: Progressing  Flowsheets (Taken 10/6/2024 0831)  Verbalizes/displays adequate comfort level or baseline comfort level:   Encourage patient to monitor pain and request assistance   Assess pain using appropriate pain scale   Administer analgesics based on type and severity of pain and evaluate response   Implement non-pharmacological measures as appropriate and evaluate response     Problem: Safety - Adult  Goal: Free from fall injury  10/6/2024 1913 by Bryce Roy, RN  Outcome: Progressing  10/6/2024 1827 by Anna Medina, RN  Outcome: Progressing  Flowsheets (Taken 10/6/2024 1022)  Free From Fall Injury: Instruct family/caregiver on patient safety

## 2024-10-06 NOTE — CARE COORDINATION
DISCHARGE PLANNING EVALUATION: OP/OBSERVATION        10/5/24, 12:03 PM EDT    Mariangel Abreu         Location: OBS 17/17-1   Reason for hospitalization: Chest pain [R07.9]  Chest pain, unspecified type [R07.9]     CM Services requested for transitional needs.     PCP: Carla Chua, VERONIKA Dalton CNP    Transportation/Food Security/Housing Addressed:  No issues identified.     Equipment needs: no; has cane, walker, scooter, shower seat and bathroom grab bars    Case Management Services Information Letter Provided [x]    Transition plan: plan is to return home independent and has HHC thru [facility], but does not know name of company. has transportation

## 2024-10-06 NOTE — PLAN OF CARE
Problem: Discharge Planning  Goal: Discharge to home or other facility with appropriate resources  Outcome: Progressing  Flowsheets (Taken 10/6/2024 0800)  Discharge to home or other facility with appropriate resources:   Identify barriers to discharge with patient and caregiver   Arrange for needed discharge resources and transportation as appropriate   Identify discharge learning needs (meds, wound care, etc)     Problem: Pain  Goal: Verbalizes/displays adequate comfort level or baseline comfort level  Outcome: Progressing  Flowsheets (Taken 10/6/2024 0831)  Verbalizes/displays adequate comfort level or baseline comfort level:   Encourage patient to monitor pain and request assistance   Assess pain using appropriate pain scale   Administer analgesics based on type and severity of pain and evaluate response   Implement non-pharmacological measures as appropriate and evaluate response     Problem: Safety - Adult  Goal: Free from fall injury  Outcome: Progressing  Flowsheets (Taken 10/6/2024 1022)  Free From Fall Injury: Instruct family/caregiver on patient safety

## 2024-10-06 NOTE — PLAN OF CARE
Problem: Discharge Planning  Goal: Discharge to home or other facility with appropriate resources  10/5/2024 2224 by Bryce Roy, RN  Outcome: Progressing  10/5/2024 1503 by Shana Kline RN  Outcome: Progressing     Problem: Pain  Goal: Verbalizes/displays adequate comfort level or baseline comfort level  10/5/2024 2224 by Bryce Roy, RN  Outcome: Progressing  10/5/2024 1503 by Shana Kline RN  Outcome: Progressing     Problem: Safety - Adult  Goal: Free from fall injury  10/5/2024 2224 by Bryce Roy, RN  Outcome: Progressing  10/5/2024 1503 by Shana Kline RN  Outcome: Progressing

## 2024-10-07 ENCOUNTER — HOSPITAL ENCOUNTER (OUTPATIENT)
Dept: CARDIAC REHAB | Age: 74
Setting detail: THERAPIES SERIES
End: 2024-10-07
Payer: MEDICARE

## 2024-10-07 VITALS
HEIGHT: 62 IN | DIASTOLIC BLOOD PRESSURE: 68 MMHG | WEIGHT: 229 LBS | SYSTOLIC BLOOD PRESSURE: 116 MMHG | HEART RATE: 68 BPM | OXYGEN SATURATION: 95 % | BODY MASS INDEX: 42.14 KG/M2 | TEMPERATURE: 98.1 F | RESPIRATION RATE: 18 BRPM

## 2024-10-07 PROBLEM — R07.9 CHEST PAIN: Status: RESOLVED | Noted: 2024-07-10 | Resolved: 2024-10-07

## 2024-10-07 LAB
EKG ATRIAL RATE: 70 BPM
EKG P AXIS: 64 DEGREES
EKG P-R INTERVAL: 142 MS
EKG Q-T INTERVAL: 382 MS
EKG QRS DURATION: 70 MS
EKG QTC CALCULATION (BAZETT): 412 MS
EKG R AXIS: -48 DEGREES
EKG T AXIS: 73 DEGREES
EKG VENTRICULAR RATE: 70 BPM
GLUCOSE BLD-MCNC: 124 MG/DL (ref 65–105)
GLUCOSE BLD-MCNC: 366 MG/DL (ref 65–105)

## 2024-10-07 PROCEDURE — G0378 HOSPITAL OBSERVATION PER HR: HCPCS

## 2024-10-07 PROCEDURE — 6370000000 HC RX 637 (ALT 250 FOR IP)

## 2024-10-07 PROCEDURE — 6360000002 HC RX W HCPCS

## 2024-10-07 PROCEDURE — 94640 AIRWAY INHALATION TREATMENT: CPT

## 2024-10-07 PROCEDURE — 82947 ASSAY GLUCOSE BLOOD QUANT: CPT

## 2024-10-07 PROCEDURE — 2580000003 HC RX 258

## 2024-10-07 PROCEDURE — 6370000000 HC RX 637 (ALT 250 FOR IP): Performed by: STUDENT IN AN ORGANIZED HEALTH CARE EDUCATION/TRAINING PROGRAM

## 2024-10-07 PROCEDURE — 2700000000 HC OXYGEN THERAPY PER DAY

## 2024-10-07 PROCEDURE — 96372 THER/PROPH/DIAG INJ SC/IM: CPT

## 2024-10-07 PROCEDURE — 93010 ELECTROCARDIOGRAM REPORT: CPT | Performed by: INTERNAL MEDICINE

## 2024-10-07 RX ORDER — ISOSORBIDE MONONITRATE 30 MG/1
60 TABLET, EXTENDED RELEASE ORAL DAILY
Qty: 30 TABLET | Refills: 3 | Status: SHIPPED | OUTPATIENT
Start: 2024-10-07

## 2024-10-07 RX ORDER — RANOLAZINE 500 MG/1
500 TABLET, EXTENDED RELEASE ORAL 2 TIMES DAILY
Qty: 60 TABLET | Refills: 3 | Status: SHIPPED | OUTPATIENT
Start: 2024-10-07

## 2024-10-07 RX ORDER — TICAGRELOR 90 MG/1
90 TABLET ORAL 2 TIMES DAILY
Qty: 60 TABLET | Refills: 1 | OUTPATIENT
Start: 2024-10-07

## 2024-10-07 RX ADMIN — METOPROLOL TARTRATE 12.5 MG: 25 TABLET, FILM COATED ORAL at 08:25

## 2024-10-07 RX ADMIN — OXYCODONE HYDROCHLORIDE AND ACETAMINOPHEN 1 TABLET: 5; 325 TABLET ORAL at 02:20

## 2024-10-07 RX ADMIN — MIDODRINE HYDROCHLORIDE 2.5 MG: 5 TABLET ORAL at 08:26

## 2024-10-07 RX ADMIN — INSULIN LISPRO 6 UNITS: 100 INJECTION, SOLUTION INTRAVENOUS; SUBCUTANEOUS at 11:56

## 2024-10-07 RX ADMIN — ASPIRIN 81 MG: 81 TABLET, COATED ORAL at 08:23

## 2024-10-07 RX ADMIN — ENOXAPARIN SODIUM 40 MG: 100 INJECTION SUBCUTANEOUS at 08:29

## 2024-10-07 RX ADMIN — TOPIRAMATE 100 MG: 25 TABLET, FILM COATED ORAL at 08:22

## 2024-10-07 RX ADMIN — INSULIN GLARGINE 50 UNITS: 100 INJECTION, SOLUTION SUBCUTANEOUS at 08:29

## 2024-10-07 RX ADMIN — METFORMIN HYDROCHLORIDE 500 MG: 500 TABLET, EXTENDED RELEASE ORAL at 08:22

## 2024-10-07 RX ADMIN — PANTOPRAZOLE SODIUM 40 MG: 40 TABLET, DELAYED RELEASE ORAL at 08:25

## 2024-10-07 RX ADMIN — AMLODIPINE BESYLATE 2.5 MG: 5 TABLET ORAL at 08:27

## 2024-10-07 RX ADMIN — MIDODRINE HYDROCHLORIDE 2.5 MG: 5 TABLET ORAL at 11:57

## 2024-10-07 RX ADMIN — ISOSORBIDE MONONITRATE 60 MG: 30 TABLET, EXTENDED RELEASE ORAL at 08:22

## 2024-10-07 RX ADMIN — TICAGRELOR 90 MG: 90 TABLET ORAL at 08:23

## 2024-10-07 RX ADMIN — SODIUM CHLORIDE, PRESERVATIVE FREE 10 ML: 5 INJECTION INTRAVENOUS at 08:30

## 2024-10-07 RX ADMIN — ESCITALOPRAM OXALATE 20 MG: 10 TABLET ORAL at 08:23

## 2024-10-07 RX ADMIN — RANOLAZINE 500 MG: 500 TABLET, FILM COATED, EXTENDED RELEASE ORAL at 08:27

## 2024-10-07 RX ADMIN — BUDESONIDE AND FORMOTEROL FUMARATE DIHYDRATE 2 PUFF: 80; 4.5 AEROSOL RESPIRATORY (INHALATION) at 07:29

## 2024-10-07 ASSESSMENT — PAIN SCALES - GENERAL
PAINLEVEL_OUTOF10: 0
PAINLEVEL_OUTOF10: 7

## 2024-10-07 ASSESSMENT — PAIN SCALES - WONG BAKER: WONGBAKER_NUMERICALRESPONSE: NO HURT

## 2024-10-07 NOTE — DISCHARGE SUMMARY
CDU Discharge Summary        Patient:  Mariangel Abreu  YOB: 1950    MRN: [id number]   Acct: [id number]    Primary Care Physician: Carla Chua APRN - CNP    Admit date:  10/4/2024  3:14 PM  Discharge date: 10/7/2024 1 PM    Discharge Diagnoses:     1.)  Patient had chest pain with acute onset due to unknown pathology.  Treated with pain management.  Patient's symptoms are resolved with the plan to discharge home    Follow-up:  Call today/tomorrow for a follow up appointment with Carla Chua APRN - CNP , or return to the Emergency Room with worsening symptoms    Stressed to patient the importance of following up with primary care doctor for further workup/management of symptoms.  Pt verbalizes understanding and agrees with plan.    Discharge Medication Changes:       Medication List        START taking these medications      ranolazine 500 MG extended release tablet  Commonly known as: RANEXA  Take 1 tablet by mouth 2 times daily            CHANGE how you take these medications      isosorbide mononitrate 30 MG extended release tablet  Commonly known as: IMDUR  Take 2 tablets by mouth daily  What changed: how much to take            CONTINUE taking these medications      * albuterol (2.5 MG/3ML) 0.083% nebulizer solution  Commonly known as: PROVENTIL     * albuterol sulfate  (90 Base) MCG/ACT inhaler  Commonly known as: Ventolin HFA  Inhale 2 puffs into the lungs 4 times daily as needed for Wheezing     alogliptin 12.5 MG Tabs tablet  Commonly known as: NESINA  Take 1 tablet by mouth daily     amLODIPine 2.5 MG tablet  Commonly known as: NORVASC  Take 1 tablet by mouth daily     aspirin 81 MG EC tablet  Take 1 tablet by mouth daily     Brilinta 90 MG Tabs tablet  Generic drug: ticagrelor  TAKE 1 TABLET BY MOUTH 2 TIMES DAILY     docusate sodium 100 MG capsule  Commonly known as: COLACE  Take 1 capsule by mouth 2 times daily Take 1 pill twice a day as needed for constipation

## 2024-10-07 NOTE — DISCHARGE INSTRUCTIONS
You came to the ED and observation unit with chest pain, we did an echo was unremarkable, cardiology recommended adding Ranexa and increasing the Imdur dose to 60 mg, cardiology recommended outpatient follow-up in 1 week.    Please return to the ED if you had chest pain, shortness of breath, dizziness.

## 2024-10-07 NOTE — PROGRESS NOTES
[facility]  CDU / OBSERVATION eNCOUnter  Attending NOte       I performed a history and physical examination of the patient and discussed management with the resident.  This patient was placed in the observation unit for reevaluation for possible admission to the hospital. I reviewed the resident’s note and agree with the documented findings and plan of care. Any areas of disagreement are noted on the chart. I was personally present for the key portions of any procedures. I have documented in the chart those procedures where I was not present during the key portions. I have reviewed the nurses notes. I agree with the chief complaint, past medical history, past surgical history, allergies, medications, social and family history as documented unless otherwise noted below.    The patient was placed in the observation unit for reevaluation for possible admission to the hospital.      The Family history, social history, and ROS are effectively unchanged since admission unless noted elsewhere in the chart.    73-year-old female admitted for chest pain.  Cardiology evaluated the patient, and recommended an echo.  Echo showed normal wall motion.  Normal diastolic function.  Cardiology made some medication adjustments, by adding Ranexa and increasing her Imdur.  Patient feels comfortable going home.  Plan for discharge.    Francine Sanz MD  Attending Emergency  Physician    
   [facility]  CDU / OBSERVATION ENCOUNTER  ATTENDING NOTE         I performed a history and physical examination of the patient and discussed management with the resident or midlevel provider. I reviewed the resident or midlevel provider's note and agree with the documented findings and plan of care. Any areas of disagreement are noted on the chart. I was personally present for the key portions of any procedures. I have documented in the chart those procedures where I was not present during the key portions. I have reviewed the nurses notes. I agree with the chief complaint, past medical history, past surgical history, allergies, medications, social and family history as documented unless otherwise noted below.    The Family history, social history, and ROS are effectively unchanged since admission unless noted elsewhere in the chart.     This patient was placed in the observation unit for reevaluation for possible admission to the hospital     Patient with some improvement but still requiring symptom management.  Patient feeling lightheaded and not entirely steady.  Patient has been reevaluated by cardiology.  Echocardiogram has been pending.  Awaiting results.    Patient has demonstrated some improvement while on new cardiac regimen.  Will continue to monitor on new medications for symptom abatement and appropriate discharge.  Appreciate cardiology input.  Patient for reevaluation in the morning.       David Gleason MD  Attending Emergency  Physician    
   [facility]  CDU / OBSERVATION ENCOUNTER  ATTENDING NOTE         I performed a history and physical examination of the patient and discussed management with the resident or midlevel provider. I reviewed the resident or midlevel provider's note and agree with the documented findings and plan of care. Any areas of disagreement are noted on the chart. I was personally present for the key portions of any procedures. I have documented in the chart those procedures where I was not present during the key portions. I have reviewed the nurses notes. I agree with the chief complaint, past medical history, past surgical history, allergies, medications, social and family history as documented unless otherwise noted below.    The Family history, social history, and ROS are effectively unchanged since admission unless noted elsewhere in the chart.     This patient was placed in the observation unit for reevaluation for possible admission to the hospital     Patient had chest discomfort while exerting herself.  Patient had symptoms consistent with anginal discomfort.  Patient had negative workup in the ED and is admitted to observation for further cardiology evaluation.    Patient was seen by attending cardiologist on rounds and echocardiogram has been ordered.  Medical management includes plans for Ranexa and increasing Imdur.    Disposition based on testing results       David Gleason MD  Attending Emergency  Physician    
BRONCHOSPASM/BRONCHOCONSTRICTION     [x]         IMPROVE AERATION/BREATH SOUNDS  [x]   ADMINISTER BRONCHODILATOR THERAPY AS APPROPRIATE  [x]   ASSESS BREATH SOUNDS  []   IMPLEMENT AEROSOL/MDI PROTOCOL  [x]   PATIENT EDUCATION AS NEEDED    
BRONCHOSPASM/BRONCHOCONSTRICTION     [x]         IMPROVE AERATION/BREATH SOUNDS  [x]   ADMINISTER BRONCHODILATOR THERAPY AS APPROPRIATE  [x]   ASSESS BREATH SOUNDS  [x]   IMPLEMENT AEROSOL/MDI PROTOCOL  [x]   PATIENT EDUCATION AS NEEDED    
OBS/CDU   Progress NOTE      Patients PCP is:  Carla Chua, APRN - CNP        SUBJECTIVE      Patient states that she feels much better, patient states that she has some chest pain this morning, patient states that it is much better than yesterday.  Patient blood pressure 107/71    PHYSICAL EXAM      General: NAD, AO X 3  Heent: EMOI, PERRL  Neck: SUPPLE, NO JVD  Cardiovascular: RRR, S1S2  Pulmonary: CTAB, NO SOB  Abdomen: SOFT, NTTP, ND, +BS  Extremities: +2/4 PULSES DISTAL, NO SWELLING  Neuro / Psych: NO NUMBNESS OR TINGLING, MENTATION AT BASELINE    PERTINENT TEST /EXAMS      I have reviewed all available laboratory results.    MEDICATIONS CURRENT   isosorbide mononitrate (IMDUR) extended release tablet 60 mg, Daily  ranolazine (RANEXA) extended release tablet 500 mg, BID  glucose chewable tablet 16 g, PRN  dextrose bolus 10% 125 mL, PRN   Or  dextrose bolus 10% 250 mL, PRN  glucagon injection 1 mg, PRN  dextrose 10 % infusion, Continuous PRN  insulin lispro (HUMALOG,ADMELOG) injection vial 0-8 Units, TID WC  insulin lispro (HUMALOG,ADMELOG) injection vial 0-4 Units, Nightly  melatonin tablet 5 mg, Nightly PRN  sodium chloride flush 0.9 % injection 5-40 mL, 2 times per day  sodium chloride flush 0.9 % injection 5-40 mL, PRN  0.9 % sodium chloride infusion, PRN  potassium chloride (KLOR-CON M) extended release tablet 40 mEq, PRN   Or  potassium bicarb-citric acid (EFFER-K) effervescent tablet 40 mEq, PRN   Or  potassium chloride 10 mEq/100 mL IVPB (Peripheral Line), PRN  magnesium sulfate 2000 mg in 50 mL IVPB premix, PRN  enoxaparin (LOVENOX) injection 40 mg, Daily  ondansetron (ZOFRAN-ODT) disintegrating tablet 4 mg, Q8H PRN   Or  ondansetron (ZOFRAN) injection 4 mg, Q6H PRN  polyethylene glycol (GLYCOLAX) packet 17 g, Daily PRN  acetaminophen (TYLENOL) tablet 650 mg, Q6H PRN   Or  acetaminophen (TYLENOL) suppository 650 mg, Q6H PRN  topiramate (TOPAMAX) tablet 100 mg, Daily  ticagrelor (BRILINTA) tablet 90 
OBS/CDU   Progress NOTE      Patients PCP is:  Carla Chua, APRN - GIOVANNY        SUBJECTIVE      Patient states that she feels good today, no nausea no vomiting no chest pain or shortness of breath.  Patient is symptom-free to go home.  The patient states that she feels comfortable going home.    PHYSICAL EXAM      General: NAD, AO X 3  Heent: EMOI, PERRL  Neck: SUPPLE, NO JVD  Cardiovascular: RRR, S1S2  Pulmonary: CTAB, NO SOB  Abdomen: SOFT, NTTP, ND, +BS  Extremities: +2/4 PULSES DISTAL, NO SWELLING  Neuro / Psych: NO NUMBNESS OR TINGLING, MENTATION AT BASELINE    PERTINENT TEST /EXAMS      I have reviewed all available laboratory results.    MEDICATIONS CURRENT   isosorbide mononitrate (IMDUR) extended release tablet 60 mg, Daily  ranolazine (RANEXA) extended release tablet 500 mg, BID  glucose chewable tablet 16 g, PRN  dextrose bolus 10% 125 mL, PRN   Or  dextrose bolus 10% 250 mL, PRN  glucagon injection 1 mg, PRN  dextrose 10 % infusion, Continuous PRN  insulin lispro (HUMALOG,ADMELOG) injection vial 0-8 Units, TID WC  insulin lispro (HUMALOG,ADMELOG) injection vial 0-4 Units, Nightly  melatonin tablet 5 mg, Nightly PRN  sodium chloride flush 0.9 % injection 5-40 mL, 2 times per day  sodium chloride flush 0.9 % injection 5-40 mL, PRN  0.9 % sodium chloride infusion, PRN  potassium chloride (KLOR-CON M) extended release tablet 40 mEq, PRN   Or  potassium bicarb-citric acid (EFFER-K) effervescent tablet 40 mEq, PRN   Or  potassium chloride 10 mEq/100 mL IVPB (Peripheral Line), PRN  magnesium sulfate 2000 mg in 50 mL IVPB premix, PRN  enoxaparin (LOVENOX) injection 40 mg, Daily  ondansetron (ZOFRAN-ODT) disintegrating tablet 4 mg, Q8H PRN   Or  ondansetron (ZOFRAN) injection 4 mg, Q6H PRN  polyethylene glycol (GLYCOLAX) packet 17 g, Daily PRN  acetaminophen (TYLENOL) tablet 650 mg, Q6H PRN   Or  acetaminophen (TYLENOL) suppository 650 mg, Q6H PRN  topiramate (TOPAMAX) tablet 100 mg, Daily  ticagrelor 
hypercoagulable state (HCC)     Familial hypercholesterolemia     Claudication (HCC)     Type 2 diabetes mellitus with hyperglycemia (HCC)     Hyperglycemia     CAD S/P percutaneous coronary angioplasty     Chest pain      Plan of Treatment:   Stable. Continue present medications with adjustments made yesterday  Pain today is more reproducible on exam  Discussed Ranexa & Imdur with patient  Await repeat echo, if low risk then will be OK for d/c with OP f/u in clinic.  Discussed with patient and RN    Electronically signed by VERONIKA Villanueva CNP on 10/6/2024 at 11:45 AM  [facility].  [phone]

## 2024-10-08 ENCOUNTER — CARE COORDINATION (OUTPATIENT)
Dept: CARE COORDINATION | Age: 74
End: 2024-10-08

## 2024-10-08 ENCOUNTER — ENROLLMENT (OUTPATIENT)
Dept: CARE COORDINATION | Age: 74
End: 2024-10-08

## 2024-10-08 NOTE — TELEPHONE ENCOUNTER
Writer to attempted to contact patient for clarification regarding her insulin & sugars per RB. Unable to leave a voicemail

## 2024-10-08 NOTE — TELEPHONE ENCOUNTER
Can you please update me as of right now what dose you are taking of your long-acting, basal insulin.  As well as current dosing for your sliding scale or mealtime insulin.   I know we have you taking 5 units of insulin at meals plus her sliding scale but I was not sure if your basal insulin that you take twice a day has changed with your previous admissions or with your visits to diabetic education

## 2024-10-08 NOTE — CARE COORDINATION
Care Transitions Note    Initial Call - Call within 2 business days of discharge: Yes    Attempted to reach patient for transitions of care follow up. Unable to reach patient.    Outreach Attempts:   Unable to leave message.     Patient: Mariangel Abreu    Patient : 1950   MRN: [id number]    Reason for Admission: chest pain  Discharge Date: 10/7/24  RURS: Readmission Risk Score: 21.3    Last Discharge Facility       Date Complaint Diagnosis Description Type Department Provider    10/4/24 Chest Pain Chest pain, unspecified type ED to Hosp-Admission (Discharged) (ADMITTED) David Gomez MD; [facility]...            # 1 attempt-Attempted initial 24 hour hospital follow up call. Left a Hipaa compliant message with name and call back information. Requested return call to [phone].     Was this an external facility discharge? No    Follow Up Appointment:   Patient does not have a follow up appointment scheduled at time of call.     Future Appointments         Provider Specialty Dept Phone    10/9/2024  8:00 AM [facility] RM 01 Cardiac Rehabilitation [phone]    10/14/2024 8:00 AM [facility] RM 01 Cardiac Rehabilitation [phone]    10/15/2024 1:30 PM Eveline Mckeon RD, LD Diabetes Services [phone]    10/16/2024 8:00 AM [facility] 01 Cardiac Rehabilitation [phone]    10/16/2024 3:00 PM Karen Cardoso APRN - CNP Pain Management [phone]    10/21/2024 8:00 AM [facility] 01 Cardiac Rehabilitation [phone]    10/23/2024 8:00 AM [facility] RM 01 Cardiac Rehabilitation [phone]    10/28/2024 8:00 AM [facility] RM 01 Cardiac Rehabilitation [phone]    12/3/2024 10:00 AM Vivian Owusu RN Diabetes Services [phone]            Plan for follow-up on next business day.  2nd attempt 24 hr HFU     Zamzam Hollingsworth RN

## 2024-10-09 ENCOUNTER — HOSPITAL ENCOUNTER (OUTPATIENT)
Dept: CARDIAC REHAB | Age: 74
Setting detail: THERAPIES SERIES
Discharge: HOME | End: 2024-10-09
Payer: MEDICARE

## 2024-10-09 ENCOUNTER — APPOINTMENT (OUTPATIENT)
Dept: CARDIAC REHAB | Age: 74
End: 2024-10-09
Payer: MEDICARE

## 2024-10-09 ENCOUNTER — CARE COORDINATION (OUTPATIENT)
Dept: CARE COORDINATION | Age: 74
End: 2024-10-09

## 2024-10-09 NOTE — CARE COORDINATION
Care Transitions Note    Initial Call - Call within 2 business days of discharge: Yes    Attempted to reach patient for transitions of care follow up. Unable to reach patient.    Outreach Attempts:       Patient: Mariangel Abreu    Patient : 1950   MRN: [id number]    Reason for Admission: cp  Discharge Date: 10/7/24  RURS: Readmission Risk Score: 21.3    Last Discharge Facility       Date Complaint Diagnosis Description Type Department Provider    10/4/24 Chest Pain Chest pain, unspecified type ED to Hosp-Admission (Discharged) (ADMITTED) David Gomez MD; [facility]...        Attempted initial 24 hour hospital follow up call. Left a Hipaa compliant message with name and call back information. Requested return call to [phone].     Was this an external facility discharge? No    Follow Up Appointment:       Patient does not have a follow up appointment scheduled at time of call.     Future Appointments         Provider Specialty Dept Phone    10/14/2024 8:00 AM [facility]  01 Cardiac Rehabilitation [phone]    10/15/2024 1:30 PM Eveline Mckeon RD, LD Diabetes Services [phone]    10/16/2024 8:00 AM [facility]  01 Cardiac Rehabilitation [phone]    10/16/2024 3:00 PM Karen Cardoso APRN - CNP Pain Management [phone]    10/21/2024 8:00 AM [facility] RM 01 Cardiac Rehabilitation [phone]    10/23/2024 8:00 AM [facility]  01 Cardiac Rehabilitation [phone]    10/28/2024 8:00 AM [facility] RM 01 Cardiac Rehabilitation [phone]    10/30/2024 8:00 AM [facility] RM 01 Cardiac Rehabilitation [phone]    12/3/2024 10:00 AM Vivian Owusu RN Diabetes Services [phone]            No further follow-up call indicated     Zamzam Hollingsworth RN

## 2024-10-09 NOTE — PROGRESS NOTES
PT NO CALL, NO SHOW FOR CARDIAC REHAB TODAY. WRITER CALLED PT, PT APOLOGIZES AND STATES \"I JUST WASN'T FEELING WELL THIS MORNING AND FORGOT TO CALL. I'M SORRY.\"

## 2024-10-11 RX ORDER — TICAGRELOR 90 MG/1
90 TABLET ORAL 2 TIMES DAILY
Qty: 60 TABLET | Refills: 1 | OUTPATIENT
Start: 2024-10-11

## 2024-10-14 ENCOUNTER — HOSPITAL ENCOUNTER (OUTPATIENT)
Dept: CARDIAC REHAB | Age: 74
Setting detail: THERAPIES SERIES
Discharge: HOME | End: 2024-10-14
Payer: MEDICARE

## 2024-10-15 ENCOUNTER — APPOINTMENT (OUTPATIENT)
Dept: DIABETES SERVICES | Age: 74
End: 2024-10-15
Payer: MEDICARE

## 2024-10-15 ENCOUNTER — OFFICE VISIT (OUTPATIENT)
Dept: PRIMARY CARE CLINIC | Age: 74
End: 2024-10-15

## 2024-10-15 VITALS
SYSTOLIC BLOOD PRESSURE: 123 MMHG | TEMPERATURE: 97.3 F | HEART RATE: 78 BPM | BODY MASS INDEX: 42.14 KG/M2 | OXYGEN SATURATION: 91 % | DIASTOLIC BLOOD PRESSURE: 64 MMHG | WEIGHT: 229 LBS | HEIGHT: 62 IN

## 2024-10-15 DIAGNOSIS — Z79.4 TYPE 2 DIABETES MELLITUS WITH HYPERGLYCEMIA, WITH LONG-TERM CURRENT USE OF INSULIN (HCC): ICD-10-CM

## 2024-10-15 DIAGNOSIS — E11.42 TYPE 2 DIABETES MELLITUS WITH DIABETIC POLYNEUROPATHY, WITH LONG-TERM CURRENT USE OF INSULIN (HCC): Primary | ICD-10-CM

## 2024-10-15 DIAGNOSIS — E11.65 TYPE 2 DIABETES MELLITUS WITH HYPERGLYCEMIA, WITH LONG-TERM CURRENT USE OF INSULIN (HCC): ICD-10-CM

## 2024-10-15 DIAGNOSIS — Z79.4 TYPE 2 DIABETES MELLITUS WITH DIABETIC POLYNEUROPATHY, WITH LONG-TERM CURRENT USE OF INSULIN (HCC): Primary | ICD-10-CM

## 2024-10-15 LAB — HBA1C MFR BLD: 13.6 %

## 2024-10-15 RX ORDER — FLURBIPROFEN SODIUM 0.3 MG/ML
SOLUTION/ DROPS OPHTHALMIC
Qty: 200 EACH | Refills: 5 | Status: SHIPPED | OUTPATIENT
Start: 2024-10-15

## 2024-10-15 RX ORDER — LANCETS 30 GAUGE
1 EACH MISCELLANEOUS
Qty: 300 EACH | Refills: 5 | Status: SHIPPED | OUTPATIENT
Start: 2024-10-15 | End: 2024-11-14

## 2024-10-15 NOTE — PROGRESS NOTES
Mariangel Abreu (:  1950) is a 73 y.o. female,Established patient, here for evaluation of the following chief complaint(s):  Diabetes    Mariangel Abreu is a 73-year female here today for routine follow-up for uncontrolled type 2 diabetes.  She is involved with diabetic education and has been at scheduled appointment on 10/29/2024.  Currently in cardiac rehab after stent placement.  Patient recently seen in ER for chest pain, Imdur was increased to 60 mg with addition of Ranexa.         Assessment & Plan  Type 2 diabetes mellitus with diabetic polyneuropathy, with long-term current use of insulin ([facility])    Discussed insulin dosing changes with patient, but it appears she is following previous insulin directions along with the new sliding scale corrections.  She will follow the moderate dosing sliding scale for the but was also advised to take 5 units of insulin with each meal plus sliding scale.  Continue current basal insulin at 40 in the morning and 50 units in the evening    Orders:    POCT glycosylated hemoglobin (Hb A1C)    Insulin Pen Needle (ULTICARE MINI PEN NEEDLES) 31G X 6 MM MISC; Inject 5 times daily    Blood Gluc Meter Disp-Strips (BLOOD GLUCOSE METER DISPOSABLE) HARMEET; Check up to 5 times a day    Lancets MISC; 1 each by Does not apply route 5 times daily    Type 2 diabetes mellitus with hyperglycemia, with long-term current use of insulin ([facility])   Chronic, not at goal (unstable), continue current treatment plan, medication adherence emphasized, and lifestyle modifications recommended    Orders:    Insulin Pen Needle (ULTICARE MINI PEN NEEDLES) 31G X 6 MM MISC; Inject 5 times daily    Blood Gluc Meter Disp-Strips (BLOOD GLUCOSE METER DISPOSABLE) HARMEET; Check up to 5 times a day    Lancets MISC; 1 each by Does not apply route 5 times daily      Return in about 3 months (around 1/15/2025) for Diabetes.       Subjective   Mariangel states she is confused about her insulin dosing.  She has

## 2024-10-15 NOTE — PATIENT INSTRUCTIONS
take 5 units with each meal plus the sliding scale based on her sugars.  For example her sliding scale states to take 14 units of insulin if sugar is higher than 400.  She will take 5 units plus the 14 directed on the sliding scale

## 2024-10-16 ENCOUNTER — HOSPITAL ENCOUNTER (OUTPATIENT)
Dept: CARDIAC REHAB | Age: 74
Setting detail: THERAPIES SERIES
Discharge: HOME | End: 2024-10-16
Payer: MEDICARE

## 2024-10-16 ENCOUNTER — HOSPITAL ENCOUNTER (OUTPATIENT)
Dept: PAIN MANAGEMENT | Age: 74
Discharge: HOME | End: 2024-10-16
Payer: MEDICARE

## 2024-10-16 VITALS — HEIGHT: 62 IN | WEIGHT: 229 LBS | BODY MASS INDEX: 42.14 KG/M2

## 2024-10-16 DIAGNOSIS — M54.16 LUMBAR RADICULOPATHY, CHRONIC: ICD-10-CM

## 2024-10-16 DIAGNOSIS — Z79.891 CHRONIC USE OF OPIATE FOR THERAPEUTIC PURPOSE: Primary | ICD-10-CM

## 2024-10-16 DIAGNOSIS — M50.30 DDD (DEGENERATIVE DISC DISEASE), CERVICAL: Chronic | ICD-10-CM

## 2024-10-16 DIAGNOSIS — M46.1 SACROILIITIS, NOT ELSEWHERE CLASSIFIED (HCC): Chronic | ICD-10-CM

## 2024-10-16 DIAGNOSIS — M47.817 LUMBOSACRAL SPONDYLOSIS WITHOUT MYELOPATHY: Chronic | ICD-10-CM

## 2024-10-16 PROCEDURE — 99213 OFFICE O/P EST LOW 20 MIN: CPT

## 2024-10-16 RX ORDER — OXYCODONE AND ACETAMINOPHEN 5; 325 MG/1; MG/1
1 TABLET ORAL EVERY 8 HOURS PRN
Qty: 90 TABLET | Refills: 0 | Status: SHIPPED | OUTPATIENT
Start: 2024-10-22 | End: 2024-11-21

## 2024-10-16 ASSESSMENT — ENCOUNTER SYMPTOMS
SHORTNESS OF BREATH: 0
BACK PAIN: 1
COUGH: 0
CONSTIPATION: 0

## 2024-10-16 ASSESSMENT — PAIN DESCRIPTION - LOCATION: LOCATION: BACK

## 2024-10-16 ASSESSMENT — PAIN SCALES - GENERAL: PAINLEVEL_OUTOF10: 9

## 2024-10-16 NOTE — ASSESSMENT & PLAN NOTE
Chronic, not at goal (unstable), continue current treatment plan, medication adherence emphasized, and lifestyle modifications recommended    Orders:    Insulin Pen Needle (ULTICARE MINI PEN NEEDLES) 31G X 6 MM MISC; Inject 5 times daily    Blood Gluc Meter Disp-Strips (BLOOD GLUCOSE METER DISPOSABLE) HARMEET; Check up to 5 times a day    Lancets MISC; 1 each by Does not apply route 5 times daily

## 2024-10-16 NOTE — PROGRESS NOTES
May 2, 2023  EMPLOYEE INFORMATION: EMPLOYER INFORMATION:   NAME: Roseline Rodarte [facility] [address]   : 1973 [phone]    DATE OF INJURY/EVENT: 2023           Treating Provider: Renaldo Grossman PA-C  Time In:  9:05 AM Time Out:  9:35 AM      DIAGNOSIS:   1. Sprain of left thumb, unspecified site of digit, initial encounter    2. Radial styloid tenosynovitis    3. Issue of medical certificate for disability examination        RETURN TO WORK:                 RESTRICTIONS: Restrictions are to be followed at work and at home.  Restrictions are in effect until next follow-up visit.  Avoid heavy gripping with left hand, no participating in Safety Care. Wear splint as needed at work.     TREATMENT PLAN:   Medications for this injury/condition:  Ibuprofen 200mg: two tablets four times daily with food. Do not take more than 12 tablets in 24 hours.    Referral/Consult:   Occupational Therapy: Start        Diagnostic Testing: None       Instructions: Start OT-pending. First visit will be approved. Patient will need to provide claim number/authorization for remaining sessions.     Follow up on 2023 at 9:00am or sooner if symptoms worsen.     NEXT RETURN VISIT:  2023 at 9:00am    Thank you for the privilege of providing medical care for this injury/condition.  If there are any questions, please call the occupational health clinic at Dept: [phone].      Electronically signed on 2023 at 9:35 AM by:   Renaldo Grossman PA-C   [facility]      
   Fentanyl     Iodides Itching    Morphine Other (See Comments)     hallucinations    Moxifloxacin      Other reaction(s): Intolerance-unknown  Other reaction(s): Intolerance-unknown    Reglan [Metoclopramide] Nausea Only    Avelox [Moxifloxacin Hcl In Nacl] Rash     Dermatitis      Cipro Xr Rash     dermatitis    Ofloxacin Rash    Sulfa Antibiotics Rash         Current Outpatient Medications:     Insulin Pen Needle (ULTICARE MINI PEN NEEDLES) 31G X 6 MM MISC, Inject 5 times daily, Disp: 200 each, Rfl: 5    Blood Gluc Meter Disp-Strips (BLOOD GLUCOSE METER DISPOSABLE) HARMEET, Check up to 5 times a day, Disp: 200 each, Rfl: 2    Lancets MISC, 1 each by Does not apply route 5 times daily, Disp: 300 each, Rfl: 5    ranolazine (RANEXA) 500 MG extended release tablet, Take 1 tablet by mouth 2 times daily, Disp: 60 tablet, Rfl: 3    isosorbide mononitrate (IMDUR) 30 MG extended release tablet, Take 2 tablets by mouth daily, Disp: 30 tablet, Rfl: 3    rosuvastatin (CRESTOR) 40 MG tablet, TAKE 1 TABLET BY MOUTH NIGHTLY, Disp: 30 tablet, Rfl: 3    oxyCODONE-acetaminophen (PERCOCET) 5-325 MG per tablet, Take 1 tablet by mouth every 8 hours as needed for Pain for up to 30 days. Intended supply: 30 days., Disp: 90 tablet, Rfl: 0    ticagrelor (BRILINTA) 90 MG TABS tablet, TAKE 1 TABLET BY MOUTH 2 TIMES DAILY, Disp: 60 tablet, Rfl: 1    Continuous Glucose  (FREESTYLE SHIVA 2 READER) HARMEET, Apply  with shiva sensor daily and as needed, Disp: 1 each, Rfl: 0    Continuous Glucose Sensor (FREESTYLE SHIVA 2 SENSOR) [facility], Apply to arm once every 2 weeks., Disp: 6 each, Rfl: 5    alogliptin (NESINA) 12.5 MG TABS tablet, Take 1 tablet by mouth daily, Disp: 30 tablet, Rfl: 6    insulin aspart (NOVOLOG FLEXPEN) 100 UNIT/ML injection pen, IF<139 NO INSULIN; 140-199-4 UN;200-249-6 UN;250-299-8 UN;300-349-10 UN;350-400=12 UN;ABOVE 400-14 UNIT(S), Disp: 5 Adjustable Dose Pre-filled Pen Syringe, Rfl: 11    amLODIPine (NORVASC)

## 2024-10-21 ENCOUNTER — HOSPITAL ENCOUNTER (OUTPATIENT)
Dept: CARDIAC REHAB | Age: 74
Setting detail: THERAPIES SERIES
End: 2024-10-21
Payer: MEDICARE

## 2024-10-23 ENCOUNTER — HOSPITAL ENCOUNTER (OUTPATIENT)
Dept: CARDIAC REHAB | Age: 74
Setting detail: THERAPIES SERIES
Discharge: HOME | End: 2024-10-23
Payer: MEDICARE

## 2024-10-29 ENCOUNTER — APPOINTMENT (OUTPATIENT)
Dept: DIABETES SERVICES | Age: 74
End: 2024-10-29
Payer: MEDICARE

## 2024-10-30 ENCOUNTER — APPOINTMENT (OUTPATIENT)
Dept: CARDIAC REHAB | Age: 74
End: 2024-10-30
Payer: MEDICARE

## 2024-11-01 ENCOUNTER — APPOINTMENT (OUTPATIENT)
Dept: CT IMAGING | Age: 74
DRG: 158 | End: 2024-11-01
Payer: MEDICARE

## 2024-11-01 ENCOUNTER — APPOINTMENT (OUTPATIENT)
Dept: GENERAL RADIOLOGY | Age: 74
DRG: 158 | End: 2024-11-01
Payer: MEDICARE

## 2024-11-01 ENCOUNTER — APPOINTMENT (OUTPATIENT)
Dept: MRI IMAGING | Age: 74
DRG: 158 | End: 2024-11-01
Payer: MEDICARE

## 2024-11-01 ENCOUNTER — HOSPITAL ENCOUNTER (INPATIENT)
Age: 74
LOS: 2 days | Discharge: HOME | DRG: 158 | End: 2024-11-03
Attending: EMERGENCY MEDICINE | Admitting: INTERNAL MEDICINE
Payer: MEDICARE

## 2024-11-01 ENCOUNTER — APPOINTMENT (OUTPATIENT)
Dept: VASCULAR LAB | Age: 74
DRG: 158 | End: 2024-11-01
Payer: MEDICARE

## 2024-11-01 DIAGNOSIS — R53.1 LEFT-SIDED WEAKNESS: ICD-10-CM

## 2024-11-01 DIAGNOSIS — R07.9 CHEST PAIN, UNSPECIFIED TYPE: Primary | ICD-10-CM

## 2024-11-01 DIAGNOSIS — M79.662 PAIN OF LEFT CALF: ICD-10-CM

## 2024-11-01 DIAGNOSIS — R73.9 HYPERGLYCEMIA: ICD-10-CM

## 2024-11-01 PROBLEM — R51.9 FACIAL PAIN, ACUTE: Status: ACTIVE | Noted: 2024-11-01

## 2024-11-01 PROBLEM — Z79.4 TYPE 2 DIABETES MELLITUS WITH HYPERGLYCEMIA, WITH LONG-TERM CURRENT USE OF INSULIN (HCC): Status: ACTIVE | Noted: 2024-11-01

## 2024-11-01 PROBLEM — R79.82 ELEVATED C-REACTIVE PROTEIN (CRP): Status: ACTIVE | Noted: 2024-11-01

## 2024-11-01 PROBLEM — R70.0 ELEVATED ERYTHROCYTE SEDIMENTATION RATE: Status: ACTIVE | Noted: 2024-11-01

## 2024-11-01 PROBLEM — R73.09 UNCONTROLLED BLOOD GLUCOSE: Status: ACTIVE | Noted: 2024-11-01

## 2024-11-01 PROBLEM — G45.9 TIA (TRANSIENT ISCHEMIC ATTACK): Status: ACTIVE | Noted: 2024-11-01

## 2024-11-01 PROBLEM — E11.65 TYPE 2 DIABETES MELLITUS WITH HYPERGLYCEMIA, WITH LONG-TERM CURRENT USE OF INSULIN (HCC): Status: ACTIVE | Noted: 2024-11-01

## 2024-11-01 LAB
ANION GAP SERPL CALCULATED.3IONS-SCNC: 17 MMOL/L (ref 9–16)
B-OH-BUTYR SERPL-MCNC: 0.47 MMOL/L (ref 0.02–0.27)
BASOPHILS # BLD: 0.07 K/UL (ref 0–0.2)
BASOPHILS NFR BLD: 1 % (ref 0–2)
BNP SERPL-MCNC: 234 PG/ML (ref 0–125)
BODY TEMPERATURE: 37
BUN SERPL-MCNC: 15 MG/DL (ref 8–23)
CALCIUM SERPL-MCNC: 8.6 MG/DL (ref 8.6–10.4)
CHLORIDE SERPL-SCNC: 95 MMOL/L (ref 98–107)
CK SERPL-CCNC: 55 U/L (ref 26–192)
CO2 SERPL-SCNC: 21 MMOL/L (ref 20–31)
COHGB MFR BLD: 2.3 % (ref 0–5)
CREAT SERPL-MCNC: 1.1 MG/DL (ref 0.6–0.9)
CRP SERPL HS-MCNC: 37.6 MG/L (ref 0–5)
EOSINOPHIL # BLD: 0.08 K/UL (ref 0–0.44)
EOSINOPHILS RELATIVE PERCENT: 1 % (ref 1–4)
ERYTHROCYTE [DISTWIDTH] IN BLOOD BY AUTOMATED COUNT: 12.8 % (ref 11.8–14.4)
ERYTHROCYTE [SEDIMENTATION RATE] IN BLOOD BY PHOTOMETRIC METHOD: 48 MM/HR (ref 0–30)
FIO2 ON VENT: ABNORMAL %
GFR, ESTIMATED: 53 ML/MIN/1.73M2
GLUCOSE BLD-MCNC: 475 MG/DL (ref 65–105)
GLUCOSE BLD-MCNC: 568 MG/DL (ref 65–105)
GLUCOSE SERPL-MCNC: 442 MG/DL (ref 74–99)
GLUCOSE SERPL-MCNC: 680 MG/DL (ref 74–99)
HCO3 VENOUS: 27.4 MMOL/L (ref 24–30)
HCT VFR BLD AUTO: 42.7 % (ref 36.3–47.1)
HGB BLD-MCNC: 14.1 G/DL (ref 11.9–15.1)
IMM GRANULOCYTES # BLD AUTO: 0.05 K/UL (ref 0–0.3)
IMM GRANULOCYTES NFR BLD: 1 %
INR PPP: 1
LACTIC ACID, WHOLE BLOOD: 1.3 MMOL/L (ref 0.7–2.1)
LYMPHOCYTES NFR BLD: 1.33 K/UL (ref 1.1–3.7)
LYMPHOCYTES RELATIVE PERCENT: 12 % (ref 24–43)
MCH RBC QN AUTO: 29.4 PG (ref 25.2–33.5)
MCHC RBC AUTO-ENTMCNC: 33 G/DL (ref 28.4–34.8)
MCV RBC AUTO: 89.1 FL (ref 82.6–102.9)
MONOCYTES NFR BLD: 0.85 K/UL (ref 0.1–1.2)
MONOCYTES NFR BLD: 8 % (ref 3–12)
MYOGLOBIN SERPL-MCNC: 37 NG/ML (ref 25–58)
NEUTROPHILS NFR BLD: 77 % (ref 36–65)
NEUTS SEG NFR BLD: 8.47 K/UL (ref 1.5–8.1)
NRBC BLD-RTO: 0 PER 100 WBC
O2 SAT, VEN: 87.6 % (ref 60–85)
PARTIAL THROMBOPLASTIN TIME: 24.5 SEC (ref 23–36.5)
PCO2 VENOUS: 53 MM HG (ref 39–55)
PH VENOUS: 7.33 (ref 7.32–7.42)
PLATELET # BLD AUTO: 160 K/UL (ref 138–453)
PMV BLD AUTO: 13 FL (ref 8.1–13.5)
PO2 VENOUS: 57 MM HG (ref 30–50)
POSITIVE BASE EXCESS, VEN: 1 MMOL/L (ref 0–2)
POTASSIUM SERPL-SCNC: 4.5 MMOL/L (ref 3.7–5.3)
PROTHROMBIN TIME: 13.4 SEC (ref 11.7–14.9)
RBC # BLD AUTO: 4.79 M/UL (ref 3.95–5.11)
SODIUM SERPL-SCNC: 133 MMOL/L (ref 136–145)
TROPONIN I SERPL HS-MCNC: 12 NG/L (ref 0–14)
TSH SERPL DL<=0.05 MIU/L-ACNC: 1.43 UIU/ML (ref 0.27–4.2)
WBC OTHER # BLD: 10.9 K/UL (ref 3.5–11.3)

## 2024-11-01 PROCEDURE — 96375 TX/PRO/DX INJ NEW DRUG ADDON: CPT

## 2024-11-01 PROCEDURE — 96372 THER/PROPH/DIAG INJ SC/IM: CPT

## 2024-11-01 PROCEDURE — 93005 ELECTROCARDIOGRAM TRACING: CPT

## 2024-11-01 PROCEDURE — 70450 CT HEAD/BRAIN W/O DYE: CPT

## 2024-11-01 PROCEDURE — 6360000002 HC RX W HCPCS

## 2024-11-01 PROCEDURE — 83880 ASSAY OF NATRIURETIC PEPTIDE: CPT

## 2024-11-01 PROCEDURE — 94761 N-INVAS EAR/PLS OXIMETRY MLT: CPT

## 2024-11-01 PROCEDURE — 6370000000 HC RX 637 (ALT 250 FOR IP)

## 2024-11-01 PROCEDURE — G0378 HOSPITAL OBSERVATION PER HR: HCPCS

## 2024-11-01 PROCEDURE — 2580000003 HC RX 258

## 2024-11-01 PROCEDURE — 82010 KETONE BODYS QUAN: CPT

## 2024-11-01 PROCEDURE — 70551 MRI BRAIN STEM W/O DYE: CPT

## 2024-11-01 PROCEDURE — 85730 THROMBOPLASTIN TIME PARTIAL: CPT

## 2024-11-01 PROCEDURE — 82947 ASSAY GLUCOSE BLOOD QUANT: CPT

## 2024-11-01 PROCEDURE — 96374 THER/PROPH/DIAG INJ IV PUSH: CPT

## 2024-11-01 PROCEDURE — 84443 ASSAY THYROID STIM HORMONE: CPT

## 2024-11-01 PROCEDURE — 83874 ASSAY OF MYOGLOBIN: CPT

## 2024-11-01 PROCEDURE — 80048 BASIC METABOLIC PNL TOTAL CA: CPT

## 2024-11-01 PROCEDURE — 86140 C-REACTIVE PROTEIN: CPT

## 2024-11-01 PROCEDURE — 82553 CREATINE MB FRACTION: CPT

## 2024-11-01 PROCEDURE — 85610 PROTHROMBIN TIME: CPT

## 2024-11-01 PROCEDURE — 36415 COLL VENOUS BLD VENIPUNCTURE: CPT

## 2024-11-01 PROCEDURE — 71046 X-RAY EXAM CHEST 2 VIEWS: CPT

## 2024-11-01 PROCEDURE — 82805 BLOOD GASES W/O2 SATURATION: CPT

## 2024-11-01 PROCEDURE — 99222 1ST HOSP IP/OBS MODERATE 55: CPT | Performed by: PSYCHIATRY & NEUROLOGY

## 2024-11-01 PROCEDURE — 85025 COMPLETE CBC W/AUTO DIFF WBC: CPT

## 2024-11-01 PROCEDURE — 99285 EMERGENCY DEPT VISIT HI MDM: CPT

## 2024-11-01 PROCEDURE — 85652 RBC SED RATE AUTOMATED: CPT

## 2024-11-01 PROCEDURE — 82550 ASSAY OF CK (CPK): CPT

## 2024-11-01 PROCEDURE — 84484 ASSAY OF TROPONIN QUANT: CPT

## 2024-11-01 PROCEDURE — 94660 CPAP INITIATION&MGMT: CPT

## 2024-11-01 PROCEDURE — 99223 1ST HOSP IP/OBS HIGH 75: CPT | Performed by: PSYCHIATRY & NEUROLOGY

## 2024-11-01 PROCEDURE — 83605 ASSAY OF LACTIC ACID: CPT

## 2024-11-01 PROCEDURE — 1200000000 HC SEMI PRIVATE

## 2024-11-01 PROCEDURE — 2700000000 HC OXYGEN THERAPY PER DAY

## 2024-11-01 PROCEDURE — 93971 EXTREMITY STUDY: CPT

## 2024-11-01 RX ORDER — POTASSIUM CHLORIDE 7.45 MG/ML
10 INJECTION INTRAVENOUS PRN
Status: ACTIVE | OUTPATIENT
Start: 2024-11-01 | End: 2024-11-02

## 2024-11-01 RX ORDER — CARBAMAZEPINE 200 MG/1
200 TABLET ORAL 2 TIMES DAILY
Status: DISCONTINUED | OUTPATIENT
Start: 2024-11-02 | End: 2024-11-02

## 2024-11-01 RX ORDER — ASPIRIN 81 MG/1
81 TABLET ORAL DAILY
Status: DISCONTINUED | OUTPATIENT
Start: 2024-11-01 | End: 2024-11-03 | Stop reason: HOSPADM

## 2024-11-01 RX ORDER — ESCITALOPRAM OXALATE 10 MG/1
20 TABLET ORAL DAILY
Status: DISCONTINUED | OUTPATIENT
Start: 2024-11-01 | End: 2024-11-03 | Stop reason: HOSPADM

## 2024-11-01 RX ORDER — SODIUM CHLORIDE 0.9 % (FLUSH) 0.9 %
5-40 SYRINGE (ML) INJECTION PRN
Status: DISCONTINUED | OUTPATIENT
Start: 2024-11-01 | End: 2024-11-03 | Stop reason: HOSPADM

## 2024-11-01 RX ORDER — MAGNESIUM SULFATE IN WATER 40 MG/ML
2000 INJECTION, SOLUTION INTRAVENOUS PRN
Status: DISCONTINUED | OUTPATIENT
Start: 2024-11-01 | End: 2024-11-03 | Stop reason: HOSPADM

## 2024-11-01 RX ORDER — OXYCODONE AND ACETAMINOPHEN 5; 325 MG/1; MG/1
1 TABLET ORAL EVERY 8 HOURS PRN
Status: DISCONTINUED | OUTPATIENT
Start: 2024-11-01 | End: 2024-11-03 | Stop reason: HOSPADM

## 2024-11-01 RX ORDER — MIDODRINE HYDROCHLORIDE 5 MG/1
2.5 TABLET ORAL
Status: DISCONTINUED | OUTPATIENT
Start: 2024-11-01 | End: 2024-11-01

## 2024-11-01 RX ORDER — PANTOPRAZOLE SODIUM 40 MG/1
40 TABLET, DELAYED RELEASE ORAL
Status: DISCONTINUED | OUTPATIENT
Start: 2024-11-02 | End: 2024-11-03 | Stop reason: HOSPADM

## 2024-11-01 RX ORDER — ONDANSETRON 4 MG/1
4 TABLET, ORALLY DISINTEGRATING ORAL EVERY 8 HOURS PRN
Status: DISCONTINUED | OUTPATIENT
Start: 2024-11-01 | End: 2024-11-03 | Stop reason: HOSPADM

## 2024-11-01 RX ORDER — DEXTROSE MONOHYDRATE 100 MG/ML
INJECTION, SOLUTION INTRAVENOUS CONTINUOUS PRN
Status: DISCONTINUED | OUTPATIENT
Start: 2024-11-01 | End: 2024-11-02

## 2024-11-01 RX ORDER — INSULIN GLARGINE 100 [IU]/ML
40 INJECTION, SOLUTION SUBCUTANEOUS 2 TIMES DAILY
Status: DISCONTINUED | OUTPATIENT
Start: 2024-11-01 | End: 2024-11-03

## 2024-11-01 RX ORDER — INSULIN LISPRO 100 [IU]/ML
0-16 INJECTION, SOLUTION INTRAVENOUS; SUBCUTANEOUS ONCE
Status: COMPLETED | OUTPATIENT
Start: 2024-11-01 | End: 2024-11-01

## 2024-11-01 RX ORDER — OXYBUTYNIN CHLORIDE 5 MG/1
5 TABLET, EXTENDED RELEASE ORAL DAILY
Status: DISCONTINUED | OUTPATIENT
Start: 2024-11-01 | End: 2024-11-03 | Stop reason: HOSPADM

## 2024-11-01 RX ORDER — ONDANSETRON 2 MG/ML
4 INJECTION INTRAMUSCULAR; INTRAVENOUS EVERY 6 HOURS PRN
Status: DISCONTINUED | OUTPATIENT
Start: 2024-11-01 | End: 2024-11-03 | Stop reason: HOSPADM

## 2024-11-01 RX ORDER — CARBAMAZEPINE 200 MG/1
400 TABLET ORAL
Status: DISPENSED | OUTPATIENT
Start: 2024-11-01 | End: 2024-11-02

## 2024-11-01 RX ORDER — MIDODRINE HYDROCHLORIDE 5 MG/1
2.5 TABLET ORAL 3 TIMES DAILY PRN
Status: DISCONTINUED | OUTPATIENT
Start: 2024-11-01 | End: 2024-11-03 | Stop reason: HOSPADM

## 2024-11-01 RX ORDER — INSULIN LISPRO 100 [IU]/ML
0-16 INJECTION, SOLUTION INTRAVENOUS; SUBCUTANEOUS
Status: DISCONTINUED | OUTPATIENT
Start: 2024-11-01 | End: 2024-11-03 | Stop reason: HOSPADM

## 2024-11-01 RX ORDER — ISOSORBIDE MONONITRATE 60 MG/1
60 TABLET, EXTENDED RELEASE ORAL DAILY
Status: DISCONTINUED | OUTPATIENT
Start: 2024-11-01 | End: 2024-11-03 | Stop reason: HOSPADM

## 2024-11-01 RX ORDER — POLYETHYLENE GLYCOL 3350 17 G/17G
17 POWDER, FOR SOLUTION ORAL DAILY PRN
Status: DISCONTINUED | OUTPATIENT
Start: 2024-11-01 | End: 2024-11-03 | Stop reason: HOSPADM

## 2024-11-01 RX ORDER — RANOLAZINE 500 MG/1
500 TABLET, EXTENDED RELEASE ORAL 2 TIMES DAILY
Status: DISCONTINUED | OUTPATIENT
Start: 2024-11-01 | End: 2024-11-03 | Stop reason: HOSPADM

## 2024-11-01 RX ORDER — ACETAMINOPHEN 650 MG/1
650 SUPPOSITORY RECTAL EVERY 6 HOURS PRN
Status: DISCONTINUED | OUTPATIENT
Start: 2024-11-01 | End: 2024-11-03 | Stop reason: HOSPADM

## 2024-11-01 RX ORDER — POTASSIUM CHLORIDE 1500 MG/1
40 TABLET, EXTENDED RELEASE ORAL PRN
Status: ACTIVE | OUTPATIENT
Start: 2024-11-01 | End: 2024-11-02

## 2024-11-01 RX ORDER — HEPARIN SODIUM 5000 [USP'U]/ML
5000 INJECTION, SOLUTION INTRAVENOUS; SUBCUTANEOUS EVERY 8 HOURS SCHEDULED
Status: DISCONTINUED | OUTPATIENT
Start: 2024-11-01 | End: 2024-11-03 | Stop reason: HOSPADM

## 2024-11-01 RX ORDER — DIPHENHYDRAMINE HYDROCHLORIDE 50 MG/ML
25 INJECTION INTRAMUSCULAR; INTRAVENOUS ONCE
Status: COMPLETED | OUTPATIENT
Start: 2024-11-01 | End: 2024-11-01

## 2024-11-01 RX ORDER — ACETAMINOPHEN 325 MG/1
650 TABLET ORAL EVERY 6 HOURS PRN
Status: DISCONTINUED | OUTPATIENT
Start: 2024-11-01 | End: 2024-11-03 | Stop reason: HOSPADM

## 2024-11-01 RX ORDER — AMLODIPINE BESYLATE 5 MG/1
2.5 TABLET ORAL DAILY
Status: DISCONTINUED | OUTPATIENT
Start: 2024-11-01 | End: 2024-11-03 | Stop reason: HOSPADM

## 2024-11-01 RX ORDER — ACETAMINOPHEN 325 MG/1
650 TABLET ORAL ONCE
Status: COMPLETED | OUTPATIENT
Start: 2024-11-01 | End: 2024-11-01

## 2024-11-01 RX ORDER — METOPROLOL TARTRATE 25 MG/1
12.5 TABLET, FILM COATED ORAL 2 TIMES DAILY
Status: DISCONTINUED | OUTPATIENT
Start: 2024-11-01 | End: 2024-11-03 | Stop reason: HOSPADM

## 2024-11-01 RX ORDER — GLUCAGON 1 MG/ML
1 KIT INJECTION PRN
Status: DISCONTINUED | OUTPATIENT
Start: 2024-11-01 | End: 2024-11-02

## 2024-11-01 RX ORDER — SODIUM CHLORIDE 0.9 % (FLUSH) 0.9 %
5-40 SYRINGE (ML) INJECTION EVERY 12 HOURS SCHEDULED
Status: DISCONTINUED | OUTPATIENT
Start: 2024-11-01 | End: 2024-11-03 | Stop reason: HOSPADM

## 2024-11-01 RX ORDER — SODIUM CHLORIDE 9 MG/ML
INJECTION, SOLUTION INTRAVENOUS PRN
Status: DISCONTINUED | OUTPATIENT
Start: 2024-11-01 | End: 2024-11-03 | Stop reason: HOSPADM

## 2024-11-01 RX ORDER — KETOROLAC TROMETHAMINE 15 MG/ML
15 INJECTION, SOLUTION INTRAMUSCULAR; INTRAVENOUS ONCE
Status: COMPLETED | OUTPATIENT
Start: 2024-11-01 | End: 2024-11-01

## 2024-11-01 RX ORDER — ROSUVASTATIN CALCIUM 20 MG/1
40 TABLET, COATED ORAL NIGHTLY
Status: DISCONTINUED | OUTPATIENT
Start: 2024-11-01 | End: 2024-11-03 | Stop reason: HOSPADM

## 2024-11-01 RX ADMIN — Medication 10 MG: at 21:18

## 2024-11-01 RX ADMIN — OXYCODONE HYDROCHLORIDE AND ACETAMINOPHEN 1 TABLET: 5; 325 TABLET ORAL at 18:37

## 2024-11-01 RX ADMIN — WATER 40 MG: 1 INJECTION INTRAMUSCULAR; INTRAVENOUS; SUBCUTANEOUS at 11:59

## 2024-11-01 RX ADMIN — DIPHENHYDRAMINE HYDROCHLORIDE 25 MG: 50 INJECTION INTRAMUSCULAR; INTRAVENOUS at 11:58

## 2024-11-01 RX ADMIN — SODIUM CHLORIDE, PRESERVATIVE FREE 10 ML: 5 INJECTION INTRAVENOUS at 22:16

## 2024-11-01 RX ADMIN — INSULIN LISPRO 16 UNITS: 100 INJECTION, SOLUTION INTRAVENOUS; SUBCUTANEOUS at 18:40

## 2024-11-01 RX ADMIN — HEPARIN SODIUM 5000 UNITS: 5000 INJECTION INTRAVENOUS; SUBCUTANEOUS at 21:08

## 2024-11-01 RX ADMIN — ROSUVASTATIN CALCIUM 40 MG: 20 TABLET, FILM COATED ORAL at 21:02

## 2024-11-01 RX ADMIN — RANOLAZINE 500 MG: 500 TABLET, FILM COATED, EXTENDED RELEASE ORAL at 21:03

## 2024-11-01 RX ADMIN — KETOROLAC TROMETHAMINE 15 MG: 15 INJECTION, SOLUTION INTRAMUSCULAR; INTRAVENOUS at 12:46

## 2024-11-01 RX ADMIN — INSULIN GLARGINE 40 UNITS: 100 INJECTION, SOLUTION SUBCUTANEOUS at 21:02

## 2024-11-01 RX ADMIN — ACETAMINOPHEN 650 MG: 325 TABLET ORAL at 12:57

## 2024-11-01 RX ADMIN — INSULIN LISPRO 16 UNITS: 100 INJECTION, SOLUTION INTRAVENOUS; SUBCUTANEOUS at 22:15

## 2024-11-01 RX ADMIN — TICAGRELOR 90 MG: 90 TABLET ORAL at 21:01

## 2024-11-01 RX ADMIN — METOPROLOL TARTRATE 12.5 MG: 25 TABLET, FILM COATED ORAL at 21:02

## 2024-11-01 ASSESSMENT — PAIN - FUNCTIONAL ASSESSMENT
PAIN_FUNCTIONAL_ASSESSMENT: 0-10
PAIN_FUNCTIONAL_ASSESSMENT: 0-10

## 2024-11-01 ASSESSMENT — PAIN DESCRIPTION - DESCRIPTORS
DESCRIPTORS: DISCOMFORT
DESCRIPTORS: SORE
DESCRIPTORS: DISCOMFORT

## 2024-11-01 ASSESSMENT — PAIN DESCRIPTION - LOCATION
LOCATION: FACE;JAW
LOCATION: JAW
LOCATION: JAW;NECK

## 2024-11-01 ASSESSMENT — PAIN SCALES - GENERAL
PAINLEVEL_OUTOF10: 9
PAINLEVEL_OUTOF10: 8
PAINLEVEL_OUTOF10: 0
PAINLEVEL_OUTOF10: 10
PAINLEVEL_OUTOF10: 0

## 2024-11-01 ASSESSMENT — PAIN DESCRIPTION - ORIENTATION
ORIENTATION: LEFT

## 2024-11-01 ASSESSMENT — LIFESTYLE VARIABLES: HOW OFTEN DO YOU HAVE A DRINK CONTAINING ALCOHOL: NEVER

## 2024-11-01 ASSESSMENT — PAIN DESCRIPTION - PAIN TYPE
TYPE: ACUTE PAIN;CHRONIC PAIN
TYPE: ACUTE PAIN;CHRONIC PAIN

## 2024-11-01 NOTE — ED PROVIDER NOTES
endoscopy (2016); eye surgery; Tubal ligation; other surgical history (09/10/15); Insertable Cardiac Monitor (2015); Tympanoplasty; Colonoscopy; Colonoscopy (04/10/2017); pr colsc flx w/removal lesion by hot bx forceps (N/A, 4/10/2017); Tympanostomy tube placement (2017); Upper gastrointestinal endoscopy (N/A, 3/2/2021); Colonoscopy (N/A, 3/2/2021); Cardiac procedure (N/A, 2024); and Cardiac procedure (N/A, 3/11/2024).    Social History     Socioeconomic History    Marital status: Legally      Spouse name: Not on file    Number of children: Not on file    Years of education: Not on file    Highest education level: Not on file   Occupational History    Occupation: disabled     Employer: N/A   Tobacco Use    Smoking status: Former     Current packs/day: 0.00     Average packs/day: 3.0 packs/day for 41.0 years (123.0 ttl pk-yrs)     Types: Cigarettes     Start date: 1959     Quit date: 2000     Years since quittin.8    Smokeless tobacco: Never    Tobacco comments:     quit in    Vaping Use    Vaping status: Never Used   Substance and Sexual Activity    Alcohol use: No     Alcohol/week: 0.0 standard drinks of alcohol    Drug use: No    Sexual activity: Not Currently   Other Topics Concern    Not on file   Social History Narrative    Not on file     Social Determinants of Health     Financial Resource Strain: Low Risk  (3/5/2024)    Overall Financial Resource Strain (CARDIA)     Difficulty of Paying Living Expenses: Not hard at all   Food Insecurity: No Food Insecurity (10/4/2024)    Hunger Vital Sign     Worried About Running Out of Food in the Last Year: Never true     Ran Out of Food in the Last Year: Never true   Transportation Needs: No Transportation Needs (10/4/2024)    PRAPARE - Transportation     Lack of Transportation (Medical): No     Lack of Transportation (Non-Medical): No   Physical Activity: Inactive (2023)    Exercise Vital Sign     Days of Exercise per  disease. [NS]   1348 Troponin, High Sensitivity: 12 [NS]   1348 Sodium(!): 133 [NS]   1348 Potassium: 4.5 [NS]   1348 Glucose(!!): 442 [NS]   1348 BUN,BUNPL: 15 [NS]   1348 Creatinine(!): 1.1 [NS]   1348 Hemoglobin Quant: 14.1 [NS]   1348 CRP(!): 37.6 [NS]   1348 NT Pro-BNP(!): 234 [NS]   1349 XR CHEST (2 VW)  IMPRESSION:  No acute airspace disease identified. [NS]   1532 Patient reviewed with internal medicine who have accepted the patient for admission.  Pending second troponin as patient required a PICC consult for IV access. [NS]      ED Course User Index  [NS] Mery Edwards MD     NIH Stroke Scale  Interval: Baseline  Level of Consciousness (1a): Alert  LOC Questions (1b): Answers both correctly  LOC Commands (1c): Performs both tasks correctly  Best Gaze (2): Normal  Visual (3): No visual loss  Facial Palsy (4): Normal symmetrical movement  Motor Arm, Left (5a): No drift  Motor Arm, Right (5b): No drift  Motor Leg, Left (6a): Drift, but does not hit bed  Motor Leg, Right (6b): No drift  Limb Ataxia (7): (!) Present in one limb  Sensory (8): (!) Mild to Moderate  Best Language (9): No aphasia  Dysarthria (10): Normal  Extinction and Inattention (11): No abnormality  Total: 3    PROCEDURES:  None     CONSULTS:  IP CONSULT TO INTERNAL MEDICINE  IP CONSULT TO VASCULAR ACCESS TEAM  IP CONSULT TO NEUROLOGY  IP CONSULT TO CASE MANAGEMENT    CRITICAL CARE:  There was significant risk of life threatening deterioration of patient's condition requiring my direct management. Critical care time 20 minutes, excluding any documented procedures.    FINAL IMPRESSION      1. Chest pain, unspecified type    2. Pain of left calf    3. Left-sided weakness    4. Hyperglycemia          DISPOSITION / PLAN     DISPOSITION Admitted 11/01/2024 03:47:43 PM           PATIENT REFERRED TO:  No follow-up provider specified.    DISCHARGE MEDICATIONS:  Current Discharge Medication List          Mery Edwards MD  Emergency

## 2024-11-01 NOTE — CONSULTS
[facility] NEUROLOGY & NEUROSCIENCE  IN-PATIENT SERVICE    NEUROLOGY CONSULT  NOTE    Date:   11/1/2024  Patient name:  Mariangel Abreu  Date of admission:  11/1/2024  YOB: 1950    Chief Complaint:     Chief Complaint   Patient presents with    Jaw Pain     Hx broken jaw that was never repaired-broken by     Neck Pain       Reason for Consult:      New left facial pain    History of Present Illness:     Mariangel Abreu is a 73 y.o. 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  Patient has been evaluated by stroke in the past with negative MRI previously.  On arrival patient is complaining of worsening left jaw pain, difficulty speaking and chewing, progressed to left facial neck and shoulder pain.  Initial NIH of 0 with some pain in the left upper and lower extremity which is chronic but waxes and wanes.     Of note patient has poorly controlled type 2 diabetes with last HbA1c of 14.  Initial blood glucose in the 500s.  Initial .  Patient has a documented iodine allergy.  CT head was negative for acute pathology.  MRI brain limited negative for acute stroke.  ESR 48 and CRP 37.6.         Past Medical History:     Past Medical History:   Diagnosis Date    Abdominal bloating 01/26/2021    Adenomatous polyp of ascending colon 01/26/2021    Allergic rhinitis     Anxiety 07/17/2013    Arthritis     Asthma     Atrial fibrillation (HCC)     Back pain     NERVE/DR. GRACIA    Benign hypertension with CKD (chronic kidney disease) stage III ([facility])     CAD (coronary artery disease) 03/21/2013    Caffeine use     2 coffee/day    CHF (congestive heart failure) ([facility])     Chronic kidney disease     CKD (chronic kidney disease) stage 3, GFR 30-59 ml/min ([facility])     Claudication ([facility]) 6/2/2024    COPD (chronic obstructive pulmonary disease) ([facility])     emphysema     tissues demonstrate no acute abnormality.     Chronic microvascular disease without acute intracranial abnormality.     CT HEAD WO CONTRAST    Result Date: 11/1/2024  EXAMINATION: CT OF THE HEAD WITHOUT CONTRAST  11/1/2024 12:07 pm TECHNIQUE: CT of the head was performed without the administration of intravenous contrast. Automated exposure control, iterative reconstruction, and/or weight based adjustment of the mA/kV was utilized to reduce the radiation dose to as low as reasonably achievable. COMPARISON: CT on 01/05/2024. HISTORY: ORDERING SYSTEM PROVIDED HISTORY: left sided weakness, left neck pain and jaw pain. weakness started around 3 am, jaw pain x 2 days TECHNOLOGIST PROVIDED HISTORY: left sided weakness, left neck pain and jaw pain. weakness started around 3 am, jaw pain x 2 days Decision Support Exception - unselect if not a suspected or confirmed emergency medical condition->Emergency Medical Condition (MA) FINDINGS: BRAIN/VENTRICLES: The cerebral and cerebellar parenchyma demonstrate volume loss with chronic microvascular white matter ischemic disease, unchanged. There are no areas of hemorrhage, mass, or midline shift.  There are no abnormal extra-axial fluid collections.  The ventricles are prominent in size, likely related to involutional change, stable.  Gray-white differentiation is maintained without evidence of acute infarct. ORBITS: Lens implants from prior cataract surgery are noted.  The orbits are otherwise unremarkable. SINUSES: The paranasal sinuses are well aerated.  There is a left mastoid effusion, unchanged.  The right mastoid air cells are clear. SOFT TISSUES/SKULL:  The calvarium is intact.  No appreciable scalp soft tissue swelling.     1. No acute intracranial abnormality. 2. Stable left mastoid effusion. 3. Stable chronic microvascular white matter ischemic disease.     XR CHEST (2 VW)    Result Date: 11/1/2024  EXAMINATION: TWO XRAY VIEWS OF THE CHEST 11/1/2024 12:43 pm COMPARISON:

## 2024-11-01 NOTE — ED TRIAGE NOTES
Pt reported chest pain with neck pain since last night  Pt sts this morning the pain moved into left jaw into ear and \"I couldn't open my mouth this morning\"  Per EMS pt was able to speak and move all extremities    New numbness to left face/neck  Pt denied N/V  Pt arrived alert Ox3 and cooperative

## 2024-11-01 NOTE — H&P
7/26/24   Carla Chua APRN - CNP   insulin aspart (NOVOLOG FLEXPEN) 100 UNIT/ML injection pen IF<139 NO INSULIN; 140-199-4 UN;200-249-6 UN;250-299-8 UN;300-349-10 UN;350-400=12 UN;ABOVE 400-14 UNIT(S) 7/26/24   Carla Chua APRN - CNP   amLODIPine (NORVASC) 2.5 MG tablet Take 1 tablet by mouth daily 7/15/24   Raisa Felder MD   insulin glargine (LANTUS SOLOSTAR) 100 UNIT/ML injection pen Inject 50 Units into the skin every morning AND 40 Units nightly. 6/18/24   Carla Chua APRN - CNP   metFORMIN (GLUCOPHAGE-XR) 500 MG extended release tablet Take 1 tablet by mouth daily (with breakfast) 6/18/24   Carla Chua APRN - CNP   topiramate (TOPAMAX) 100 MG tablet GIVE 1 TABLET BY MOUTH ONE TIME A DAY FOR SEIZURES 6/3/24   Carla Chua APRN - CNP   pantoprazole (PROTONIX) 40 MG tablet GIVE 1 TABLET BY MOUTH TWO TIMES A DAY FOR INDIGESTION 6/3/24   Carla Chua APRN - CNP   oxyBUTYnin (DITROPAN-XL) 5 MG extended release tablet GIVE 1 TABLET BY MOUTH ONE TIME A DAY FOR BLADDER SPASM 6/3/24   Carla Chua APRN - CNP   midodrine (PROAMATINE) 2.5 MG tablet GIVE 1 TABLET BY MOUTH WITH MEALS FOR HYPOTENSION ** HOLD IF SBP IS GREATER THAN 110 6/3/24   Carla Chua APRN - CNP   metoprolol tartrate (LOPRESSOR) 25 MG tablet GIVE 0.5 TABLET BY MOUTH TWO TIMES A DAY FOR HTN 6/3/24   Carla Chua APRN - CNP   escitalopram (LEXAPRO) 20 MG tablet GIVE 1 TABLET BY MOUTH ONE TIME A DAY FOR DEPRESSION 6/3/24   Carla Chua APRN - CNP   ipratropium 0.5 mg-albuterol 2.5 mg (DUONEB) 0.5-2.5 (3) MG/3ML SOLN nebulizer solution 3 mLs 4 times daily 4/22/16   ProviderNick MD   docusate sodium (COLACE) 100 MG capsule Take 1 capsule by mouth 2 times daily Take 1 pill twice a day as needed for constipation 4/17/24   Carla Chua, APRN - CNP   aspirin 81 MG EC tablet Take 1 tablet by mouth daily 3/13/24   Victoriano Combs MD   albuterol (PROVENTIL) (2.5 MG/3ML) 0.083% nebulizer solution  0.00 - 0.20 k/uL    Immature Granulocytes Absolute 0.05 0.00 - 0.30 k/uL    Myoglobin 37 25 - 58 ng/mL    Protime 13.4 11.7 - 14.9 sec    INR 1.0     APTT 24.5 23.0 - 36.5 sec    Troponin, High Sensitivity 12 0 - 14 ng/L   Brain Natriuretic Peptide    Collection Time: 11/01/24 12:43 PM   Result Value Ref Range    NT Pro- (H) 0 - 125 pg/mL   C-Reactive Protein    Collection Time: 11/01/24 12:43 PM   Result Value Ref Range    CRP 37.6 (H) 0.0 - 5.0 mg/L   Sedimentation Rate    Collection Time: 11/01/24 12:43 PM   Result Value Ref Range    Sed Rate, Automated 48 (H) 0 - 30 mm/Hr       Imaging:   MRI LIMITED BRAIN    Result Date: 11/1/2024  Chronic microvascular disease without acute intracranial abnormality.     CT HEAD WO CONTRAST    Result Date: 11/1/2024  1. No acute intracranial abnormality. 2. Stable left mastoid effusion. 3. Stable chronic microvascular white matter ischemic disease.     XR CHEST (2 VW)    Result Date: 11/1/2024  No acute airspace disease identified.       ASSESSMENT & PLAN     ASSESSMENT / PLAN:     IMPRESSION  This is a 73 y.o. female who presented with abnormal neurological sensation and worsening of weakness and found to have hyperglycemia. Patient admitted to inpatient status for concern of CVA because because of thalamic symptoms.      Active Problems:    * No active hospital problems. *    TIA/CVA/giant cell arteritis  -Patient is complaining of jaw claudication and pain  -Has a history of multiple microinfarct in past with residual left-sided weakness  -CT head without contrast showed no acute intracranial abnormality with stable left mastoid effusion and stable chronic stable microvascular white matter ischemic disease  -Neurology was consulted and stroke alert was called  -Patient was started on tegtorol and recommended temporal artery biopsy could not be started on steroid due to hide blood sugars  -Will continue to monitor  -Neurochecks per protocol  -Trend ESR and

## 2024-11-01 NOTE — ED NOTES
Admit team notified of glucose reading as ordered  
Cardiac monitor placed  
Family at bedside  
Khoa provided  Updated on plans to admit  Pt denied concerns at this time  
POCT glucose 475   
Plan to admit  Awaiting MRI  Pt updated  No concerns voiced at this time  
Pt arrived with NS one liter IVF infusing-continued per vo dr Cartagena  
Pt requesting more for pain  Perfect served sent to admit team awaiting orders  
Pt resting on cot  Pt alert  Moving all extremities  Alert Ox3 and cooperative  
Pt to go to unit  Home medications not arrived/verified (pt does not know her medications, the medications with pt may or may not be correct per pt-poor historian)  Report given  
Pt updated  
Report given to floor  
Requesting pain medication for jaw  Awaiting orders  
Resting on cot without noted change in status   
Return from vascular  
Returned from CT  
Returned from MRI  Pt watching TV  Pt denied concerns at this time  
Returned from Xray  
Spoke to MRI/MRA tech     
Stroke team at bedside  
The following labs were labeled with appropriate pt sticker and tubed to lab:     [] Blue     [] Lavender   [] on ice  [x] Green/yellow  [] Green/black [] on ice  [] Grey  [] on ice  [] Yellow  [] Red  [] Pink  [] Type/ Screen  [] ABG  [] VBG    [] COVID-19 swab    [] Rapid  [] PCR  [] Flu swab  [] Peds Viral Panel     [] Urine Sample  [] Fecal Sample  [] Pelvic Cultures  [] Blood Cultures  [] X 2  [] STREP Cultures  [] Wound Cultures  
To CT  
To MRI  
To vascular lab  
To xray  
Notable for the following components:    CRP 37.6 (*)     All other components within normal limits   SEDIMENTATION RATE - Abnormal; Notable for the following components:    Sed Rate, Automated 48 (*)     All other components within normal limits   POC GLUCOSE FINGERSTICK - Abnormal; Notable for the following components:    POC Glucose 475 (*)     All other components within normal limits   TROPONIN   C-REACTIVE PROTEIN   SEDIMENTATION RATE   HEMOGLOBIN A1C   BASIC METABOLIC PANEL W/ REFLEX TO MG FOR LOW K   CBC WITH AUTO DIFFERENTIAL   POCT GLUCOSE       Electronically signed by Karen Jackson RN on 11/1/2024 at 6:48 PM

## 2024-11-01 NOTE — ED PROVIDER NOTES
[facility]     Emergency Department     Faculty Attestation    I performed a history and physical examination of the patient and discussed management with the resident. I reviewed the resident’s note and agree with the documented findings and plan of care. Any areas of disagreement are noted on the chart. I was personally present for the key portions of any procedures. I have documented in the chart those procedures where I was not present during the key portions. I have reviewed the emergency nurses triage note. I agree with the chief complaint, past medical history, past surgical history, allergies, medications, social and family history as documented unless otherwise noted below. Documentation of the HPI, Physical Exam and Medical Decision Making performed by medical students or scribes is based on my personal performance of the HPI, PE and MDM. For Physician Assistant/ Nurse Practitioner cases/documentation I have personally evaluated this patient and have completed at least one if not all key elements of the E/M (history, physical exam, and MDM). Additional findings are as noted.    Vital signs:   Vitals:    11/01/24 1131   BP:    Pulse:    Resp:    Temp:    SpO2: 94%      73-year-old female here with left jaw and neck pain. The pain has been intermittent for 2 days. She also reports chest pain. She states she has left sided weakness, and numbness on her whole left side. The numbness and weakness started at 3:00 AM. She has a prior history of stroke with left sided deficits, but her sensation changes are new. Glucose 550.       EKG Interpretation    Interpreted by emergency department physician    Rhythm: sinus tachycardia  Rate: tachycardia  Axis: left  Ectopy: none  Conduction: normal  ST Segments: normal  T Waves: normal  Q Waves: none    Clinical Impression: non-specific EKG    MD Francine Del Castillo M.D,  Attending Emergency  Physician           Francine Sanz,

## 2024-11-01 NOTE — CONSULTS
Stroke Neurology Consult Note  Stroke Alert @11:54 AM  Arrival at bedside @11:59 AM    Reason for Consult:  ED Stroke Alert  Requesting Physician:  ED team   Endovascular Neurosurgeon: Joel Mckeon MD  Stroke Team: Joel Mckeon MD  History Obtained From:  patient  Chief Complaint: Left facial pain  Allergies:  Robitussin [guaifenesin], Codeine, Compazine [prochlorperazine maleate], Fentanyl, Iodides, Morphine, Moxifloxacin, Reglan [metoclopramide], Avelox [moxifloxacin hcl in nacl], Cipro xr, Ofloxacin, and Sulfa antibiotics    History of Presenting Illness     Mariangel Abreu is a 73 y.o. 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  Patient has been evaluated by stroke in the past with negative MRI previously.  On arrival patient is complaining of worsening left jaw pain, difficulty speaking and chewing, progressed to left facial neck and shoulder pain.  Initial NIH of 0 with some pain in the left upper and lower extremity which is chronic but waxes and wanes.    Of note patient has poorly controlled type 2 diabetes with last HbA1c of 14.  Initial blood glucose in the 500s.  Initial .  Patient has a documented iodine allergy.  CT head was negative for acute pathology.  MRI brain limited negative for acute stroke.  ESR and CRP elevated.        LKW: 3 days ago  NIHSS: 0  Modified [place] Scale: 1  SBP: 140  Glucose: approximately 500  CT head without contrast: No acute intracranial abnormality  TNK: not a candidate, outside [place]  Endovascular: Low suspicion for LVO    Review of Systems   Constitutional:  Negative for chills, fatigue and fever.   HENT:  Negative for hearing loss.    Eyes:  Negative for photophobia, pain, redness and visual disturbance.   Gastrointestinal:  Negative for nausea and vomiting.   Musculoskeletal:  Positive for neck pain. Negative for neck stiffness.

## 2024-11-01 NOTE — PROGRESS NOTES
[facility] HEALTH  [facility]  Emergency/Trauma Note    PATIENT NAME: Mariangel Abreu     Shift date: 11.1.2024  Shift day: Friday   Shift # 1    Room # 04/04   Name: Mariangel Abreu            Age: 73 y.o.  Gender: female          Language: English   Roman Catholic: Evangelical   Principal Problem: <principal problem not specified>     Trauma/Incident type: Stroke Alert    Admit Date & Time: 11/1/2024 11:21 AM  TRAUMA NAME: None    ADVANCE DIRECTIVES IN CHART?  No    NAME OF DECISION MAKER:   Primary Decision Maker (Active): David Waller - Other Relative - [phone]     RELATIONSHIP OF DECISION MAKER TO PATIENT: None     EMERGENCY CONTACTS IN CHART:  Extended Emergency Contact Information  Primary Emergency Contact: ENRICO SOL  Address: [facility]  Home Phone: [phone]  Relation: Grandchild  Secondary Emergency Contact: David Waller  Mobile Phone: [phone]  Relation: Other Relative   needed? No       PATIENT/EVENT DESCRIPTION:  Mariangel Abreu is a 73 y.o. female who arrived via EMT  from Scene of the Accident  as a Stroke Alert  due to Stroke-like issues. Pt to be admitted to 04/04.         SPIRITUAL ASSESSMENT:  Patient Assessment:  Patient appears to show low distress at this time appearing Calm, Confused, and Coping.    Patient appears to have an immediate hope of getting tests to determine what is causing the medical issues with an ultimate hope of getting the condition corrected.      Perceived Needs Assessment:  Affirm kia and hope, Build a relationship of care and support, Receive care and concern, Receive comfort, and Recieve hospitality.    Relational Resources appear average. Relational support includes daughter who had to return home with five children . Patient has a special relationship with her grandchildren and expressed her love and care for them.      Emotional Resources appear average. Appears that family are an emotional support

## 2024-11-02 ENCOUNTER — APPOINTMENT (OUTPATIENT)
Dept: GENERAL RADIOLOGY | Age: 74
DRG: 158 | End: 2024-11-02
Payer: MEDICARE

## 2024-11-02 PROBLEM — R07.9 CHEST PAIN: Status: ACTIVE | Noted: 2024-11-02

## 2024-11-02 PROBLEM — S03.40XA SPRAIN OF TEMPOROMANDIBULAR JOINT OR LIGAMENT: Status: ACTIVE | Noted: 2024-11-02

## 2024-11-02 PROBLEM — M54.2 NECK PAIN: Status: ACTIVE | Noted: 2024-11-02

## 2024-11-02 LAB
25(OH)D3 SERPL-MCNC: 13.6 NG/ML (ref 30–100)
ALBUMIN SERPL-MCNC: 3.6 G/DL (ref 3.5–5.2)
ALBUMIN/GLOB SERPL: 1.2 {RATIO} (ref 1–2.5)
ALP SERPL-CCNC: 71 U/L (ref 35–104)
ALT SERPL-CCNC: 8 U/L (ref 10–35)
ANION GAP SERPL CALCULATED.3IONS-SCNC: 12 MMOL/L (ref 9–16)
AST SERPL-CCNC: 12 U/L (ref 10–35)
BASOPHILS # BLD: 0.03 K/UL (ref 0–0.2)
BASOPHILS NFR BLD: 0 % (ref 0–2)
BILIRUB DIRECT SERPL-MCNC: 0.1 MG/DL (ref 0–0.2)
BILIRUB INDIRECT SERPL-MCNC: 0.3 MG/DL (ref 0–1)
BILIRUB SERPL-MCNC: 0.4 MG/DL (ref 0–1.2)
BILIRUB UR QL STRIP: NEGATIVE
BUN SERPL-MCNC: 32 MG/DL (ref 8–23)
CALCIUM SERPL-MCNC: 8.9 MG/DL (ref 8.6–10.4)
CHLORIDE SERPL-SCNC: 95 MMOL/L (ref 98–107)
CLARITY UR: CLEAR
CO2 SERPL-SCNC: 25 MMOL/L (ref 20–31)
COLOR UR: YELLOW
COMMENT: ABNORMAL
CREAT SERPL-MCNC: 1.4 MG/DL (ref 0.6–0.9)
CRP SERPL HS-MCNC: 80 MG/L (ref 0–5)
EKG ATRIAL RATE: 103 BPM
EKG P AXIS: 80 DEGREES
EKG P-R INTERVAL: 130 MS
EKG Q-T INTERVAL: 352 MS
EKG QRS DURATION: 70 MS
EKG QTC CALCULATION (BAZETT): 461 MS
EKG R AXIS: -69 DEGREES
EKG T AXIS: 81 DEGREES
EKG VENTRICULAR RATE: 103 BPM
EOSINOPHIL # BLD: <0.03 K/UL (ref 0–0.44)
EOSINOPHILS RELATIVE PERCENT: 0 % (ref 1–4)
ERYTHROCYTE [DISTWIDTH] IN BLOOD BY AUTOMATED COUNT: 12.9 % (ref 11.8–14.4)
ERYTHROCYTE [SEDIMENTATION RATE] IN BLOOD BY PHOTOMETRIC METHOD: 35 MM/HR (ref 0–30)
EST. AVERAGE GLUCOSE BLD GHB EST-MCNC: 361 MG/DL
GFR, ESTIMATED: 39 ML/MIN/1.73M2
GLOBULIN SER CALC-MCNC: 2.9 G/DL
GLUCOSE BLD-MCNC: 120 MG/DL (ref 65–105)
GLUCOSE BLD-MCNC: 128 MG/DL (ref 65–105)
GLUCOSE BLD-MCNC: 139 MG/DL (ref 65–105)
GLUCOSE BLD-MCNC: 156 MG/DL (ref 65–105)
GLUCOSE BLD-MCNC: 177 MG/DL (ref 65–105)
GLUCOSE BLD-MCNC: 224 MG/DL (ref 65–105)
GLUCOSE BLD-MCNC: 238 MG/DL (ref 65–105)
GLUCOSE BLD-MCNC: 273 MG/DL (ref 65–105)
GLUCOSE BLD-MCNC: 333 MG/DL (ref 65–105)
GLUCOSE BLD-MCNC: 367 MG/DL (ref 65–105)
GLUCOSE BLD-MCNC: 386 MG/DL (ref 65–105)
GLUCOSE BLD-MCNC: 456 MG/DL (ref 65–105)
GLUCOSE BLD-MCNC: 480 MG/DL (ref 65–105)
GLUCOSE SERPL-MCNC: 227 MG/DL (ref 74–99)
GLUCOSE UR STRIP-MCNC: ABNORMAL MG/DL
HBA1C MFR BLD: 14.2 % (ref 4–6)
HCT VFR BLD AUTO: 40.6 % (ref 36.3–47.1)
HGB BLD-MCNC: 13.6 G/DL (ref 11.9–15.1)
HGB UR QL STRIP.AUTO: NEGATIVE
IMM GRANULOCYTES # BLD AUTO: 0.07 K/UL (ref 0–0.3)
IMM GRANULOCYTES NFR BLD: 1 %
KETONES UR STRIP-MCNC: ABNORMAL MG/DL
LEUKOCYTE ESTERASE UR QL STRIP: NEGATIVE
LYMPHOCYTES NFR BLD: 1.21 K/UL (ref 1.1–3.7)
LYMPHOCYTES RELATIVE PERCENT: 12 % (ref 24–43)
MCH RBC QN AUTO: 29.3 PG (ref 25.2–33.5)
MCHC RBC AUTO-ENTMCNC: 33.5 G/DL (ref 28.4–34.8)
MCV RBC AUTO: 87.5 FL (ref 82.6–102.9)
MONOCYTES NFR BLD: 1 K/UL (ref 0.1–1.2)
MONOCYTES NFR BLD: 10 % (ref 3–12)
NEUTROPHILS NFR BLD: 77 % (ref 36–65)
NEUTS SEG NFR BLD: 8.16 K/UL (ref 1.5–8.1)
NITRITE UR QL STRIP: NEGATIVE
NRBC BLD-RTO: 0 PER 100 WBC
PH UR STRIP: 5.5 [PH] (ref 5–8)
PHOSPHATE SERPL-MCNC: 3.1 MG/DL (ref 2.5–4.5)
PLATELET # BLD AUTO: 175 K/UL (ref 138–453)
PMV BLD AUTO: 12.1 FL (ref 8.1–13.5)
POTASSIUM SERPL-SCNC: 4.5 MMOL/L (ref 3.7–5.3)
PROT SERPL-MCNC: 6.5 G/DL (ref 6.6–8.7)
PROT UR STRIP-MCNC: NEGATIVE MG/DL
RBC # BLD AUTO: 4.64 M/UL (ref 3.95–5.11)
SODIUM SERPL-SCNC: 132 MMOL/L (ref 136–145)
SP GR UR STRIP: 1.03 (ref 1–1.03)
UROBILINOGEN UR STRIP-ACNC: NORMAL EU/DL (ref 0–1)
WBC OTHER # BLD: 10.5 K/UL (ref 3.5–11.3)

## 2024-11-02 PROCEDURE — 6370000000 HC RX 637 (ALT 250 FOR IP)

## 2024-11-02 PROCEDURE — 94660 CPAP INITIATION&MGMT: CPT

## 2024-11-02 PROCEDURE — 84100 ASSAY OF PHOSPHORUS: CPT

## 2024-11-02 PROCEDURE — 6360000002 HC RX W HCPCS

## 2024-11-02 PROCEDURE — 85025 COMPLETE CBC W/AUTO DIFF WBC: CPT

## 2024-11-02 PROCEDURE — G0378 HOSPITAL OBSERVATION PER HR: HCPCS

## 2024-11-02 PROCEDURE — 80076 HEPATIC FUNCTION PANEL: CPT

## 2024-11-02 PROCEDURE — 99233 SBSQ HOSP IP/OBS HIGH 50: CPT | Performed by: PSYCHIATRY & NEUROLOGY

## 2024-11-02 PROCEDURE — 96365 THER/PROPH/DIAG IV INF INIT: CPT

## 2024-11-02 PROCEDURE — 81003 URINALYSIS AUTO W/O SCOPE: CPT

## 2024-11-02 PROCEDURE — 97530 THERAPEUTIC ACTIVITIES: CPT

## 2024-11-02 PROCEDURE — 84155 ASSAY OF PROTEIN SERUM: CPT

## 2024-11-02 PROCEDURE — 86140 C-REACTIVE PROTEIN: CPT

## 2024-11-02 PROCEDURE — 97162 PT EVAL MOD COMPLEX 30 MIN: CPT

## 2024-11-02 PROCEDURE — 94761 N-INVAS EAR/PLS OXIMETRY MLT: CPT

## 2024-11-02 PROCEDURE — 2700000000 HC OXYGEN THERAPY PER DAY

## 2024-11-02 PROCEDURE — 6370000000 HC RX 637 (ALT 250 FOR IP): Performed by: PSYCHIATRY & NEUROLOGY

## 2024-11-02 PROCEDURE — 2580000003 HC RX 258

## 2024-11-02 PROCEDURE — 80048 BASIC METABOLIC PNL TOTAL CA: CPT

## 2024-11-02 PROCEDURE — 82306 VITAMIN D 25 HYDROXY: CPT

## 2024-11-02 PROCEDURE — 93010 ELECTROCARDIOGRAM REPORT: CPT | Performed by: INTERNAL MEDICINE

## 2024-11-02 PROCEDURE — 72040 X-RAY EXAM NECK SPINE 2-3 VW: CPT

## 2024-11-02 PROCEDURE — 83036 HEMOGLOBIN GLYCOSYLATED A1C: CPT

## 2024-11-02 PROCEDURE — 1200000000 HC SEMI PRIVATE

## 2024-11-02 PROCEDURE — 99223 1ST HOSP IP/OBS HIGH 75: CPT | Performed by: INTERNAL MEDICINE

## 2024-11-02 PROCEDURE — 96372 THER/PROPH/DIAG INJ SC/IM: CPT

## 2024-11-02 PROCEDURE — 85652 RBC SED RATE AUTOMATED: CPT

## 2024-11-02 PROCEDURE — 84165 PROTEIN E-PHORESIS SERUM: CPT

## 2024-11-02 PROCEDURE — 82947 ASSAY GLUCOSE BLOOD QUANT: CPT

## 2024-11-02 PROCEDURE — 96366 THER/PROPH/DIAG IV INF ADDON: CPT

## 2024-11-02 PROCEDURE — 36415 COLL VENOUS BLD VENIPUNCTURE: CPT

## 2024-11-02 RX ORDER — 0.9 % SODIUM CHLORIDE 0.9 %
1000 INTRAVENOUS SOLUTION INTRAVENOUS ONCE
Status: COMPLETED | OUTPATIENT
Start: 2024-11-02 | End: 2024-11-02

## 2024-11-02 RX ORDER — GLUCAGON 1 MG/ML
1 KIT INJECTION PRN
Status: DISCONTINUED | OUTPATIENT
Start: 2024-11-02 | End: 2024-11-03 | Stop reason: HOSPADM

## 2024-11-02 RX ORDER — INSULIN GLARGINE 100 [IU]/ML
10 INJECTION, SOLUTION SUBCUTANEOUS ONCE
Status: COMPLETED | OUTPATIENT
Start: 2024-11-02 | End: 2024-11-02

## 2024-11-02 RX ORDER — INSULIN LISPRO 100 [IU]/ML
10 INJECTION, SOLUTION INTRAVENOUS; SUBCUTANEOUS ONCE
Status: COMPLETED | OUTPATIENT
Start: 2024-11-02 | End: 2024-11-02

## 2024-11-02 RX ORDER — DEXTROSE MONOHYDRATE 100 MG/ML
INJECTION, SOLUTION INTRAVENOUS CONTINUOUS PRN
Status: DISCONTINUED | OUTPATIENT
Start: 2024-11-02 | End: 2024-11-03 | Stop reason: HOSPADM

## 2024-11-02 RX ORDER — GABAPENTIN 100 MG/1
100 CAPSULE ORAL 2 TIMES DAILY
Status: DISCONTINUED | OUTPATIENT
Start: 2024-11-02 | End: 2024-11-03 | Stop reason: HOSPADM

## 2024-11-02 RX ADMIN — RANOLAZINE 500 MG: 500 TABLET, FILM COATED, EXTENDED RELEASE ORAL at 20:31

## 2024-11-02 RX ADMIN — TICAGRELOR 90 MG: 90 TABLET ORAL at 08:08

## 2024-11-02 RX ADMIN — SODIUM CHLORIDE 1000 ML: 9 INJECTION, SOLUTION INTRAVENOUS at 06:55

## 2024-11-02 RX ADMIN — CARBAMAZEPINE 200 MG: 200 TABLET ORAL at 08:11

## 2024-11-02 RX ADMIN — AMLODIPINE BESYLATE 2.5 MG: 5 TABLET ORAL at 08:08

## 2024-11-02 RX ADMIN — ASPIRIN 81 MG: 81 TABLET, COATED ORAL at 08:08

## 2024-11-02 RX ADMIN — RANOLAZINE 500 MG: 500 TABLET, FILM COATED, EXTENDED RELEASE ORAL at 08:11

## 2024-11-02 RX ADMIN — HEPARIN SODIUM 5000 UNITS: 5000 INJECTION INTRAVENOUS; SUBCUTANEOUS at 05:51

## 2024-11-02 RX ADMIN — HEPARIN SODIUM 5000 UNITS: 5000 INJECTION INTRAVENOUS; SUBCUTANEOUS at 14:55

## 2024-11-02 RX ADMIN — ROSUVASTATIN CALCIUM 40 MG: 20 TABLET, FILM COATED ORAL at 20:32

## 2024-11-02 RX ADMIN — TICAGRELOR 90 MG: 90 TABLET ORAL at 20:32

## 2024-11-02 RX ADMIN — OXYCODONE HYDROCHLORIDE AND ACETAMINOPHEN 1 TABLET: 5; 325 TABLET ORAL at 20:32

## 2024-11-02 RX ADMIN — Medication 10 MG: at 20:32

## 2024-11-02 RX ADMIN — INSULIN LISPRO 10 UNITS: 100 INJECTION, SOLUTION INTRAVENOUS; SUBCUTANEOUS at 14:55

## 2024-11-02 RX ADMIN — INSULIN GLARGINE 10 UNITS: 100 INJECTION, SOLUTION SUBCUTANEOUS at 14:55

## 2024-11-02 RX ADMIN — HEPARIN SODIUM 5000 UNITS: 5000 INJECTION INTRAVENOUS; SUBCUTANEOUS at 20:33

## 2024-11-02 RX ADMIN — GABAPENTIN 100 MG: 100 CAPSULE ORAL at 20:32

## 2024-11-02 RX ADMIN — OXYCODONE HYDROCHLORIDE AND ACETAMINOPHEN 1 TABLET: 5; 325 TABLET ORAL at 08:08

## 2024-11-02 RX ADMIN — SODIUM CHLORIDE, PRESERVATIVE FREE 10 ML: 5 INJECTION INTRAVENOUS at 20:33

## 2024-11-02 RX ADMIN — SODIUM CHLORIDE 8.4 UNITS/HR: 9 INJECTION, SOLUTION INTRAVENOUS at 02:03

## 2024-11-02 RX ADMIN — INSULIN GLARGINE 40 UNITS: 100 INJECTION, SOLUTION SUBCUTANEOUS at 20:34

## 2024-11-02 RX ADMIN — METOPROLOL TARTRATE 12.5 MG: 25 TABLET, FILM COATED ORAL at 20:32

## 2024-11-02 RX ADMIN — OXYBUTYNIN CHLORIDE 5 MG: 5 TABLET, EXTENDED RELEASE ORAL at 08:11

## 2024-11-02 RX ADMIN — METOPROLOL TARTRATE 12.5 MG: 25 TABLET, FILM COATED ORAL at 08:08

## 2024-11-02 RX ADMIN — ESCITALOPRAM OXALATE 20 MG: 10 TABLET ORAL at 08:08

## 2024-11-02 RX ADMIN — INSULIN LISPRO 4 UNITS: 100 INJECTION, SOLUTION INTRAVENOUS; SUBCUTANEOUS at 17:16

## 2024-11-02 RX ADMIN — PANTOPRAZOLE SODIUM 40 MG: 40 TABLET, DELAYED RELEASE ORAL at 05:51

## 2024-11-02 RX ADMIN — ISOSORBIDE MONONITRATE 60 MG: 60 TABLET, EXTENDED RELEASE ORAL at 08:11

## 2024-11-02 RX ADMIN — INSULIN GLARGINE 40 UNITS: 100 INJECTION, SOLUTION SUBCUTANEOUS at 10:28

## 2024-11-02 ASSESSMENT — PAIN SCALES - GENERAL
PAINLEVEL_OUTOF10: 9
PAINLEVEL_OUTOF10: 7

## 2024-11-02 ASSESSMENT — PAIN DESCRIPTION - LOCATION: LOCATION: MOUTH

## 2024-11-02 NOTE — PROGRESS NOTES
72 yo lady with left jaw pain .She has had constant left lower jaw pain for 3 days of aching component worse when chewing or talking having trouble opening mouth do to left jaw discomfort  . There has been pain in left neck and shoulder . Head CT with bilateral chronic periventricular small vessel ischemia . MRI of Head with bilateral chronic periventricular small vessel ischemia . She has diabetes mellitus on insulin uncontrolled having blood sugars on 400 range , Hga1c 14 . CRP 47 , ESR 48 . Today ESR 35 . She has HTN , CAD with prior MI , COPD , atrial fibrillation with cardiac ablation on plavix 75 mg po qd , brilinta 90 mg po bid and crestor 40 mg po qd . She has migraine headaches on topamax 100 mg po bid . Patient was loaded yesterday with tegretol 400 mg then placed on 200 mg po bid . She reports today left jaw pain is better more with percocet as needed . There had been some left arm and left heaviness which would fluctuate with pain seen by stroke team NIH 0 . She does have history of prior left P2 occlusion  seen in 202 with tingling of right arm. Significant medications plavix 75 mg po qd , brilinta 90 mg po bid , crestor 40 mg po qd . Testing Head CT with bilateral chronic periventricular small vessel ischemia . MRI of Head with bilateral chronic periventricular small vessel ischemia .      Past Medical History:   Diagnosis Date    Abdominal bloating 01/26/2021    Adenomatous polyp of ascending colon 01/26/2021    Allergic rhinitis     Anxiety 07/17/2013    Arthritis     Asthma     Atrial fibrillation (HCC)     Back pain     NERVE/DR. GRACIA    Benign hypertension with CKD (chronic kidney disease) stage III ([facility])     CAD (coronary artery disease) 03/21/2013    Caffeine use     2 coffee/day    CHF (congestive heart failure) ([facility])     Chronic kidney disease     CKD (chronic kidney disease) stage 3, GFR 30-59 ml/min ([facility])     Claudication ([facility]) 6/2/2024    COPD (chronic obstructive pulmonary disease) ([facility])

## 2024-11-02 NOTE — PLAN OF CARE
Problem: Chronic Conditions and Co-morbidities  Goal: Patient's chronic conditions and co-morbidity symptoms are monitored and maintained or improved  Outcome: Progressing  Flowsheets (Taken 11/2/2024 0018)  Care Plan - Patient's Chronic Conditions and Co-Morbidity Symptoms are Monitored and Maintained or Improved:   Monitor and assess patient's chronic conditions and comorbid symptoms for stability, deterioration, or improvement   Collaborate with multidisciplinary team to address chronic and comorbid conditions and prevent exacerbation or deterioration   Update acute care plan with appropriate goals if chronic or comorbid symptoms are exacerbated and prevent overall improvement and discharge     Problem: Discharge Planning  Goal: Discharge to home or other facility with appropriate resources  Outcome: Progressing  Flowsheets (Taken 11/2/2024 0018)  Discharge to home or other facility with appropriate resources:   Identify barriers to discharge with patient and caregiver   Arrange for needed discharge resources and transportation as appropriate   Identify discharge learning needs (meds, wound care, etc)   Refer to discharge planning if patient needs post-hospital services based on physician order or complex needs related to functional status, cognitive ability or social support system     Problem: Safety - Adult  Goal: Free from fall injury  Outcome: Progressing  Flowsheets (Taken 11/2/2024 0018)  Free From Fall Injury: Instruct family/caregiver on patient safety

## 2024-11-02 NOTE — PROGRESS NOTES
[facility]  Internal Medicine Teaching Residency Program  Inpatient Daily Progress Note  ______________________________________________________________________________    Patient: Mariangel Abreu  YOB: 1950   MRN:[id number]    Acct: [id number]     Room: 0108/0108-01  Admit date: 11/1/2024  Today's date: 11/02/24  Number of days in the hospital: 1    SUBJECTIVE   Admitting Diagnosis: TIA (transient ischemic attack)  CC: Jaw and neck pain  Pt examined at bedside. Chart & results reviewed.     -Patient was running high blood sugar and was started on IV insulin drip.    -Currently patient is hemodynamically stable with normal blood sugars.    -Urine output is 1150 mL since admission and patient is currently net -850 mL since admission.    Lab reviewed in the morning  BMP: Slight dilutional hyponatremia with sodium 132 and creatinine trending 1.4 from 1.1  CBC: Unremarkable  -DVT scan unremarkable        ROS:  Constitutional:  negative for chills, fevers, sweats  Respiratory:  negative for cough, dyspnea on exertion, hemoptysis, shortness of breath, wheezing  Cardiovascular:  negative for chest pain, chest pressure/discomfort, lower extremity edema, palpitations  Gastrointestinal:  negative for abdominal pain, constipation, diarrhea, nausea, vomiting  Neurological:  negative for dizziness, headache      Plan:  -Follow-up on ESR.  -Will touch base with cardiology for concern of chest pain.  -Will transition IV insulin to subcutaneous insulin and will resume oral diet.  BRIEF HISTORY     The patient is a pleasant 73 y.o. 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  According to the patient

## 2024-11-02 NOTE — PROGRESS NOTES
Physical Therapy  Facility/Department: [address] NEURO  Physical Therapy Initial Assessment    Name: Mariangel Abreu  : 1950  MRN: [id number]  Date of Service: 2024    Chief Complaint   Patient presents with    Jaw Pain     Hx broken jaw that was never repaired-broken by     Neck Pain       Discharge Recommendations:    Further therapy recommended at discharge.    PT Equipment Recommendations  Equipment Needed: No      Patient Diagnosis(es): The primary encounter diagnosis was Chest pain, unspecified type. Diagnoses of Pain of left calf, Left-sided weakness, and Hyperglycemia were also pertinent to this visit.  Past Medical History:  has a past medical history of Abdominal bloating, Adenomatous polyp of ascending colon, Allergic rhinitis, Anxiety, Arthritis, Asthma, Atrial fibrillation ([facility]), Back pain, Benign hypertension with CKD (chronic kidney disease) stage III ([facility]), CAD (coronary artery disease), Caffeine use, CHF (congestive heart failure) ([facility]), Chronic kidney disease, CKD (chronic kidney disease) stage 3, GFR 30-59 ml/min ([facility]), Claudication ([facility]), COPD (chronic obstructive pulmonary disease) ([facility]), Degeneration of lumbar or lumbosacral intervertebral disc, Depression, DM (diabetes mellitus) ([facility]), Emphysema of lung ([facility]), Gastritis, GERD (gastroesophageal reflux disease), Hearing loss, Hematuria, Hiatal hernia, History of loop recorder, HTN (hypertension), benign, Hypertriglyceridemia, Incontinence of urine, Irritable bowel syndrome with both constipation and diarrhea, Kidney stone, Knee arthropathy, Long term (current) use of anticoagulants, Lumbosacral spondylosis without myelopathy, Migraine headache, Mumps, Neuropathy, Obesity, On home oxygen therapy, HIGINIO on CPAP, Otitis media, Secondary diabetes mellitus with stage 3 chronic kidney disease (GFR 30-59) ([facility]), STEMI (ST elevation myocardial infarction) ([facility]), Stroke ([facility]), TIA (transient ischemic attack), Tinnitus, Type 2 diabetes  & Hobbies: cards, euchre  Additional Comments: Pt reports supposed to be moving, this weekend or next.  Vision/Hearing  Vision  Vision: Impaired  Vision Exceptions: Wears glasses at all times;Wears glasses for distance;Wears glasses for reading  Hearing  Hearing: Exceptions to WFL  Hearing Exceptions: Hard of hearing/hearing concerns;Bilateral hearing aid (missing one)    Cognition   Orientation  Overall Orientation Status: Within Functional Limits  Cognition  Overall Cognitive Status: WFL    Objective      AROM RLE (degrees)  RLE AROM: WFL  AROM LLE (degrees)  LLE AROM : WFL  AROM RUE (degrees)  RUE General AROM: see OT  AROM LUE (degrees)  LUE General AROM: see OT  Strength RLE  Strength RLE: WFL  Comment: grossly 4/5  Strength LLE  Strength LLE: WFL  Comment: grossly 4-/5, diffuse tenderness noted  Strength RUE  Comment: antigravity observed, see OT  Strength LUE  Comment: antigravity observed, see OT           Bed mobility  Supine to Sit: Contact guard assistance  Sit to Supine: Stand by assistance  Scooting: Stand by assistance  Transfers  Sit to Stand: Contact guard assistance  Stand to Sit: Contact guard assistance  Ambulation  Surface: Level tile  Device: Rolling Walker  Assistance: Contact guard assistance  Quality of Gait: slowed jon, no LOB or buckling, minorly unsteady  Distance: 2' R lateral, some c/o dizziness, seated break, 2' anterior and retro  Comments: one auto cycled SBP noted to be 69mmHg. RN notified, repositioned cuff and SBP mid 90s mmHg loke others taken during session. Pt reporting intermittant dizziness which resolves  More Ambulation?: No  Stairs/Curb  Stairs?: No     Balance  Posture: Fair  Sitting - Static: Good;-  Sitting - Dynamic: Fair;+  Standing - Static: Fair  Standing - Dynamic: Fair  Comments: RW used while assessing standing balance  Exercise Treatment: EOB ~20min SBA, dynamic activities.       AM-PAC - Mobility    AM-PAC Basic Mobility - Inpatient   How much help is needed

## 2024-11-02 NOTE — PROGRESS NOTES
Occupational Therapy    [facility]  Occupational Therapy Not Seen Note    DATE: 2024    NAME: Mariangel Abreu  MRN: [id number]   : 1950      Patient not seen this date for Occupational Therapy due to:    Patient Declined: Patient reports that participation in PT earlier utilized all energy reserves for the day and is very sleepy, pt is motivated to participate with OT but request return next day.     Next Scheduled Treatment: -    Electronically signed by Tri Monterroso OT on 2024 at 1:19 PM

## 2024-11-03 VITALS
DIASTOLIC BLOOD PRESSURE: 73 MMHG | TEMPERATURE: 97.9 F | HEART RATE: 74 BPM | RESPIRATION RATE: 21 BRPM | WEIGHT: 222.66 LBS | OXYGEN SATURATION: 97 % | BODY MASS INDEX: 40.98 KG/M2 | HEIGHT: 62 IN | SYSTOLIC BLOOD PRESSURE: 138 MMHG

## 2024-11-03 LAB
ANION GAP SERPL CALCULATED.3IONS-SCNC: 9 MMOL/L (ref 9–16)
BASOPHILS # BLD: 0.06 K/UL (ref 0–0.2)
BASOPHILS NFR BLD: 1 % (ref 0–2)
BUN SERPL-MCNC: 34 MG/DL (ref 8–23)
CALCIUM SERPL-MCNC: 8.7 MG/DL (ref 8.6–10.4)
CHLORIDE SERPL-SCNC: 101 MMOL/L (ref 98–107)
CO2 SERPL-SCNC: 26 MMOL/L (ref 20–31)
CREAT SERPL-MCNC: 1.2 MG/DL (ref 0.6–0.9)
CRP SERPL HS-MCNC: 37.1 MG/L (ref 0–5)
ECHO BSA: 2.12 M2
EOSINOPHIL # BLD: 0.2 K/UL (ref 0–0.44)
EOSINOPHILS RELATIVE PERCENT: 3 % (ref 1–4)
ERYTHROCYTE [DISTWIDTH] IN BLOOD BY AUTOMATED COUNT: 13 % (ref 11.8–14.4)
ERYTHROCYTE [SEDIMENTATION RATE] IN BLOOD BY PHOTOMETRIC METHOD: 38 MM/HR (ref 0–30)
GFR, ESTIMATED: 48 ML/MIN/1.73M2
GLUCOSE BLD-MCNC: 130 MG/DL (ref 65–105)
GLUCOSE BLD-MCNC: 225 MG/DL (ref 65–105)
GLUCOSE SERPL-MCNC: 140 MG/DL (ref 74–99)
HCT VFR BLD AUTO: 41.2 % (ref 36.3–47.1)
HGB BLD-MCNC: 13.1 G/DL (ref 11.9–15.1)
IMM GRANULOCYTES # BLD AUTO: 0.04 K/UL (ref 0–0.3)
IMM GRANULOCYTES NFR BLD: 1 %
LYMPHOCYTES NFR BLD: 2.3 K/UL (ref 1.1–3.7)
LYMPHOCYTES RELATIVE PERCENT: 33 % (ref 24–43)
MCH RBC QN AUTO: 29.6 PG (ref 25.2–33.5)
MCHC RBC AUTO-ENTMCNC: 31.8 G/DL (ref 28.4–34.8)
MCV RBC AUTO: 93.2 FL (ref 82.6–102.9)
MONOCYTES NFR BLD: 0.62 K/UL (ref 0.1–1.2)
MONOCYTES NFR BLD: 9 % (ref 3–12)
NEUTROPHILS NFR BLD: 53 % (ref 36–65)
NEUTS SEG NFR BLD: 3.68 K/UL (ref 1.5–8.1)
NRBC BLD-RTO: 0 PER 100 WBC
PLATELET # BLD AUTO: 169 K/UL (ref 138–453)
PMV BLD AUTO: 11.9 FL (ref 8.1–13.5)
POTASSIUM SERPL-SCNC: 4 MMOL/L (ref 3.7–5.3)
RBC # BLD AUTO: 4.42 M/UL (ref 3.95–5.11)
SODIUM SERPL-SCNC: 136 MMOL/L (ref 136–145)
WBC OTHER # BLD: 6.9 K/UL (ref 3.5–11.3)

## 2024-11-03 PROCEDURE — 80048 BASIC METABOLIC PNL TOTAL CA: CPT

## 2024-11-03 PROCEDURE — 6370000000 HC RX 637 (ALT 250 FOR IP): Performed by: PSYCHIATRY & NEUROLOGY

## 2024-11-03 PROCEDURE — 86140 C-REACTIVE PROTEIN: CPT

## 2024-11-03 PROCEDURE — G0378 HOSPITAL OBSERVATION PER HR: HCPCS

## 2024-11-03 PROCEDURE — 6370000000 HC RX 637 (ALT 250 FOR IP)

## 2024-11-03 PROCEDURE — 99232 SBSQ HOSP IP/OBS MODERATE 35: CPT | Performed by: PSYCHIATRY & NEUROLOGY

## 2024-11-03 PROCEDURE — 96372 THER/PROPH/DIAG INJ SC/IM: CPT

## 2024-11-03 PROCEDURE — 6360000002 HC RX W HCPCS

## 2024-11-03 PROCEDURE — 93971 EXTREMITY STUDY: CPT | Performed by: SURGERY

## 2024-11-03 PROCEDURE — 36415 COLL VENOUS BLD VENIPUNCTURE: CPT

## 2024-11-03 PROCEDURE — 97530 THERAPEUTIC ACTIVITIES: CPT

## 2024-11-03 PROCEDURE — 85025 COMPLETE CBC W/AUTO DIFF WBC: CPT

## 2024-11-03 PROCEDURE — 97166 OT EVAL MOD COMPLEX 45 MIN: CPT

## 2024-11-03 PROCEDURE — 97535 SELF CARE MNGMENT TRAINING: CPT

## 2024-11-03 PROCEDURE — 85652 RBC SED RATE AUTOMATED: CPT

## 2024-11-03 PROCEDURE — 2580000003 HC RX 258

## 2024-11-03 PROCEDURE — 99222 1ST HOSP IP/OBS MODERATE 55: CPT | Performed by: STUDENT IN AN ORGANIZED HEALTH CARE EDUCATION/TRAINING PROGRAM

## 2024-11-03 PROCEDURE — 82947 ASSAY GLUCOSE BLOOD QUANT: CPT

## 2024-11-03 PROCEDURE — 99232 SBSQ HOSP IP/OBS MODERATE 35: CPT | Performed by: INTERNAL MEDICINE

## 2024-11-03 RX ORDER — INSULIN GLARGINE 100 [IU]/ML
20 INJECTION, SOLUTION SUBCUTANEOUS NIGHTLY
Status: DISCONTINUED | OUTPATIENT
Start: 2024-11-03 | End: 2024-11-03 | Stop reason: HOSPADM

## 2024-11-03 RX ORDER — INSULIN GLARGINE 100 [IU]/ML
20 INJECTION, SOLUTION SUBCUTANEOUS DAILY
Status: DISCONTINUED | OUTPATIENT
Start: 2024-11-03 | End: 2024-11-03 | Stop reason: HOSPADM

## 2024-11-03 RX ORDER — GABAPENTIN 100 MG/1
100 CAPSULE ORAL 2 TIMES DAILY
Qty: 60 CAPSULE | Refills: 0 | Status: SHIPPED | OUTPATIENT
Start: 2024-11-03 | End: 2024-12-03

## 2024-11-03 RX ORDER — INSULIN GLARGINE 100 [IU]/ML
40 INJECTION, SOLUTION SUBCUTANEOUS NIGHTLY
Status: DISCONTINUED | OUTPATIENT
Start: 2024-11-03 | End: 2024-11-03

## 2024-11-03 RX ADMIN — ISOSORBIDE MONONITRATE 60 MG: 60 TABLET, EXTENDED RELEASE ORAL at 09:01

## 2024-11-03 RX ADMIN — RANOLAZINE 500 MG: 500 TABLET, FILM COATED, EXTENDED RELEASE ORAL at 09:01

## 2024-11-03 RX ADMIN — ESCITALOPRAM OXALATE 20 MG: 10 TABLET ORAL at 09:00

## 2024-11-03 RX ADMIN — SODIUM CHLORIDE, PRESERVATIVE FREE 10 ML: 5 INJECTION INTRAVENOUS at 09:02

## 2024-11-03 RX ADMIN — TICAGRELOR 90 MG: 90 TABLET ORAL at 09:00

## 2024-11-03 RX ADMIN — AMLODIPINE BESYLATE 2.5 MG: 5 TABLET ORAL at 09:00

## 2024-11-03 RX ADMIN — INSULIN LISPRO 4 UNITS: 100 INJECTION, SOLUTION INTRAVENOUS; SUBCUTANEOUS at 12:12

## 2024-11-03 RX ADMIN — OXYCODONE HYDROCHLORIDE AND ACETAMINOPHEN 1 TABLET: 5; 325 TABLET ORAL at 03:29

## 2024-11-03 RX ADMIN — GABAPENTIN 100 MG: 100 CAPSULE ORAL at 09:00

## 2024-11-03 RX ADMIN — OXYBUTYNIN CHLORIDE 5 MG: 5 TABLET, EXTENDED RELEASE ORAL at 09:01

## 2024-11-03 RX ADMIN — ASPIRIN 81 MG: 81 TABLET, COATED ORAL at 08:59

## 2024-11-03 RX ADMIN — PANTOPRAZOLE SODIUM 40 MG: 40 TABLET, DELAYED RELEASE ORAL at 05:29

## 2024-11-03 RX ADMIN — INSULIN GLARGINE 20 UNITS: 100 INJECTION, SOLUTION SUBCUTANEOUS at 09:01

## 2024-11-03 RX ADMIN — METOPROLOL TARTRATE 12.5 MG: 25 TABLET, FILM COATED ORAL at 09:00

## 2024-11-03 RX ADMIN — HEPARIN SODIUM 5000 UNITS: 5000 INJECTION INTRAVENOUS; SUBCUTANEOUS at 12:13

## 2024-11-03 RX ADMIN — HEPARIN SODIUM 5000 UNITS: 5000 INJECTION INTRAVENOUS; SUBCUTANEOUS at 05:29

## 2024-11-03 ASSESSMENT — PAIN SCALES - GENERAL: PAINLEVEL_OUTOF10: 9

## 2024-11-03 NOTE — CONSULTS
Attestation signed by      Attending Physician Statement:    I have discussed the care of  Mariangel Abreu , including pertinent history and exam findings, with the Cardiology fellow/resident.     I have seen and examined the patient and the key elements of all parts of the encounter have been performed by me. I agree with the assessment, plan and orders as documented by the fellow/resident, after I modified exam findings and plan of treatments, and the final version is my approved version of the assessment.     Additional Comments:              [facility]   Admission Note                 Patient's name:  Mariangel Abreu  Medical Record Number: [id number]  Patient's account/billing number: [id number]  Patient's YOB: 1950  Age: 73 y.o.  Date of Admission: 11/1/2024 11:21 AM  Date of History and Physical Examination: 11/3/2024  Primary Care Physician: Carla Chua APRN - CNP    Code Status: Full Code    CHIEF COMPLAINT:  H    CONSULT REASON: Chest pain    HISTORY OF PRESENT ILLNESS:      History was obtained from chart review and the patient.      Mariangel Abreu is a 73 y.o. female with past medical h/o coronary artery disease s/p PCI 2/2024, COPD, CVA with residual left-sided weakness, type 2 diabetes mellitus.  She presented to hospital complaining of jaw pain and neck stiffness.  She was evaluated by neurology and because of pain could be likely due to left TMJ.    Cardiology was consulted due to chest pain, troponin negative, EKG unremarkable.  She has been having this chest pain chronically, which gets exacerbated when she is under stress.  She denies any shortness of breath, nausea/vomiting, fever/chills or any other acute complaints at this time    We were consulted for chest pain    Past Medical History:     has a past medical history of Abdominal bloating, Adenomatous polyp of ascending colon, Allergic rhinitis, Anxiety, Arthritis, Asthma, Atrial  VW)    Result Date: 11/1/2024  EXAMINATION: TWO XRAY VIEWS OF THE CHEST 11/1/2024 12:43 pm COMPARISON: 10/04/2024, 07/08/2024 HISTORY: ORDERING SYSTEM PROVIDED HISTORY: chest pain TECHNOLOGIST PROVIDED HISTORY: chest pain FINDINGS: Loop recorder in place.  The cardiomediastinal silhouette is unchanged in appearance. There is no consolidation, pneumothorax, or evidence of edema. No effusion is appreciated. The osseous structures are unchanged in appearance.     No acute airspace disease identified.     Echo (TTE) complete (PRN contrast/bubble/strain/3D)    Result Date: 10/6/2024    Left Ventricle: Normal left ventricular systolic function with a visually estimated EF of 55 - 60%. EF by 2D Simpsons Biplane is 51%. Left ventricle size is normal. Increased wall thickness. Findings consistent with mild concentric hypertrophy. Normal wall motion. Normal diastolic function.   Aortic Valve: Trileaflet valve.   Image quality is adequate. Technically difficult study due to patient's body habitus.     XR CHEST (2 VW)    Result Date: 10/4/2024  EXAMINATION: TWO XRAY VIEWS OF THE CHEST 10/4/2024 3:37 pm COMPARISON: 07/08/2024 HISTORY: ORDERING SYSTEM PROVIDED HISTORY: chest pain TECHNOLOGIST PROVIDED HISTORY: chest pain FINDINGS: Loop recorder device is noted in left chest wall.  No focal consolidation, pleural effusion or pneumothorax.  The cardiomediastinal silhouette is stable.  No overt pulmonary edema.  The osseous structures are stable.     No acute cardiopulmonary process.       Last EKG:   Sinus tachycardia    Last Echo:   10/24    Left Ventricle: Normal left ventricular systolic function with a visually estimated EF of 55 - 60%. EF by 2D Simpsons Biplane is 51%. Left ventricle size is normal. Increased wall thickness. Findings consistent with mild concentric hypertrophy. Normal wall motion. Normal diastolic function.    Aortic Valve: Trileaflet valve.    Image quality is adequate. Technically difficult study due to

## 2024-11-03 NOTE — PROGRESS NOTES
Patient discharged home today.  Pt already has home O2 in place.  She can ambulate independently.  IV's discontinued with no complications.      Medications were sent from our outpatient pharmacy and instructions were given to pt with an acknowledged response.    Pt picked up by daughter in private vehicle and taken out via wheelchair.

## 2024-11-03 NOTE — PROGRESS NOTES
Occupational Therapy  Occupational Therapy Initial Evaluation  Facility/Department: [address] NEURO     Patient Name: Mariangel Abreu        MRN: [id number]    : 1950    Date of Service: 11/3/2024    Discharge Recommendations  Discharge Recommendations: Patient would benefit from continued therapy after discharge  OT Equipment Recommendations  Equipment Needed: Yes  Mobility Devices: ADL Assistive Devices  ADL Assistive Devices: Reacher, Long-handled Shoe Horn, Long-handled Sponge, Sock-Aid Hard    Chief Complaint   Patient presents with    Jaw Pain     Hx broken jaw that was never repaired-broken by     Neck Pain     Past Medical History:  has a past medical history of Abdominal bloating, Adenomatous polyp of ascending colon, Allergic rhinitis, Anxiety, Arthritis, Asthma, Atrial fibrillation ([facility]), Back pain, Benign hypertension with CKD (chronic kidney disease) stage III ([facility]), CAD (coronary artery disease), Caffeine use, CHF (congestive heart failure) ([facility]), Chronic kidney disease, CKD (chronic kidney disease) stage 3, GFR 30-59 ml/min ([facility]), Claudication ([facility]), COPD (chronic obstructive pulmonary disease) ([facility]), Degeneration of lumbar or lumbosacral intervertebral disc, Depression, DM (diabetes mellitus) ([facility]), Emphysema of lung ([facility]), Gastritis, GERD (gastroesophageal reflux disease), Hearing loss, Hematuria, Hiatal hernia, History of loop recorder, HTN (hypertension), benign, Hypertriglyceridemia, Incontinence of urine, Irritable bowel syndrome with both constipation and diarrhea, Kidney stone, Knee arthropathy, Long term (current) use of anticoagulants, Lumbosacral spondylosis without myelopathy, Migraine headache, Mumps, Neuropathy, Obesity, On home oxygen therapy, HIGINIO on CPAP, Otitis media, Secondary diabetes mellitus with stage 3 chronic kidney disease (GFR 30-59) ([facility]), STEMI (ST elevation myocardial infarction) ([facility]), Stroke ([facility]), TIA (transient ischemic attack), Tinnitus, Type 2 diabetes

## 2024-11-03 NOTE — PROGRESS NOTES
Active problem Left TMJ . Neck pain . Uncontrolled DM. The condition is jaw pain is improved on neurontin 100 mg po bid tolerating well . Cervical spine xray multilevel degenerative disc disease which is advanced at C6-C7 and moderate at C4-C5 and C5-C6.  There is also advanced multilevel facet arthropathy.Multilevel mild malalignment which is likely degenerative with anterolisthesis at C3-C4 and C7-T1 and retrolisthesis at C6-C7 . 74 yo lady with left jaw pain .She has had constant left lower jaw pain for 3 days of aching component worse when chewing or talking having trouble opening mouth do to left jaw discomfort . There has been pain in left neck and shoulder . Head CT with bilateral chronic periventricular small vessel ischemia . MRI of Head with bilateral chronic periventricular small vessel ischemia . She has diabetes mellitus on insulin uncontrolled having blood sugars on 400 range , Hga1c 14 . CRP 47 , ESR 48 . Today ESR 35 . She has HTN , CAD with prior MI , COPD , atrial fibrillation with cardiac ablation on plavix 75 mg po qd , brilinta 90 mg po bid and crestor 40 mg po qd . She has migraine headaches on topamax 100 mg po bid . There had been some left arm and left heaviness which would fluctuate with pain seen by stroke team [facility] 0 . She does have history of prior left P2 occlusion seen in 2022 with tingling of right arm. Significant medications neurontin 100 mg po bid , plavix 75 mg po qd , brilinta 90 mg po bid , crestor 40 mg po qd . Testing Head CT with bilateral chronic periventricular small vessel ischemia . MRI of Head with bilateral chronic periventricular small vessel ischemia . Cervical spine xray  Multilevel degenerative disc disease which is advanced at C6-C7 and moderate at C4-C5 and C5-C6.  There is also advanced multilevel facet arthropathy.Multilevel mild malalignment which is likely degenerative with anterolisthesis at C3-C4 and C7-T1 and retrolisthesis at C6-C7     Past Medical History:  Normal variations in rate and rhythm/Breathing unlabored/Grunting absent

## 2024-11-03 NOTE — CARE COORDINATION
Case Management Assessment  Initial Evaluation    Date/Time of Evaluation: 11/3/2024 12:44 PM  Assessment Completed by: THEO ELKINS    If patient is discharged prior to next notation, then this note serves as note for discharge by case management.    Patient Name: Mariangel Abreu                   YOB: 1950  Diagnosis: TIA (transient ischemic attack) [G45.9]  Pain of left calf [M79.662]                   Date / Time: 11/1/2024 11:21 AM    Patient Admission Status: Inpatient   Readmission Risk (Low < 19, Mod (19-27), High > 27): Readmission Risk Score: 19.6    Current PCP: Carla Chua, VERONIKA - CNP  PCP verified by CM? Yes    Chart Reviewed: Yes      History Provided by: Patient  Patient Orientation: Alert and Oriented    Patient Cognition: Alert    Hospitalization in the last 30 days (Readmission):  No    If yes, Readmission Assessment in CM Navigator will be completed.    Advance Directives:      Code Status: Full Code   Patient's Primary Decision Maker is: Legal Next of Kin    Primary Decision Maker (Active): David Waller - Other Relative - [phone]    Discharge Planning:    Patient lives with: (P) Alone Type of Home: (P) Apartment  Primary Care Giver: Self  Patient Support Systems include: Children, Family Members   Current Financial resources: (P) Medicaid, Medicare  Current community resources: (P) None  Current services prior to admission: (P) C-pap, Durable Medical Equipment, Oxygen Therapy            Current DME: (P) Walker, Oxygen Therapy (Comment), Shower Chair, Other (Comment) (Hospital Bed)            Type of Home Care services:  (P) None    ADLS  Prior functional level: (P) Assistance with the following:, Mobility, Housework, Shopping  Current functional level: (P) Assistance with the following:, Bathing, Dressing, Toileting, Mobility    PT AM-PAC: 17 /24  OT AM-PAC: 19 /24    Family can provide assistance at DC: (P) Yes  Would you like Case Management to discuss the

## 2024-11-03 NOTE — DISCHARGE INSTR - COC
Continuity of Care Form    Patient Name: Mariangel Abreu   :  1950  MRN:  [id number]    Admit date:  2024  Discharge date:  ***    Code Status Order: Full Code   Advance Directives:   Advance Care Flowsheet Documentation             Admitting Physician:  Rahul Zheng MD  PCP: Carla Chua, APRN - CNP    Discharging Nurse: ***  Discharging Hospital Unit/Room#: 0108/0108-01  Discharging Unit Phone Number: ***    Emergency Contact:   Extended Emergency Contact Information  Primary Emergency Contact: ENRICO SOL  Address: [facility]  Home Phone: [phone]  Relation: Grandchild  Secondary Emergency Contact: Sridhar  Mobile Phone: [phone]  Relation: Other Relative   needed? No    Past Surgical History:  Past Surgical History:   Procedure Laterality Date    ABLATION OF DYSRHYTHMIC FOCUS      CARDIAC PROCEDURE N/A 2024    klarissa / Left heart cath / coronary angiography / stemi er 22 performed by Victoriano Dee MD at [facility] CARDIAC CATH LAB    CARDIAC PROCEDURE N/A 3/11/2024    klarissa / Percutaneous coronary intervention performed by Victoriano Dee MD at [facility] CARDIAC CATH LAB    CARPAL TUNNEL RELEASE Right     CATARACT REMOVAL WITH IMPLANT Bilateral     CHOLECYSTECTOMY      COLONOSCOPY      COLONOSCOPY  04/10/2017    tubular adenomo polyp , mod. sigmoid diverticulosis, min. int. hemorrhoids    COLONOSCOPY N/A 3/2/2021    COLONOSCOPY WITH BIOPSY performed by Moris Brenner MD at [facility] ENDO    CYSTOSCOPY  2013    DILATION AND CURETTAGE  1979    EYE SURGERY      laser    INCONTINENCE SURGERY      Percutaneous nerve stimulation Dr Augie Amaya 2013     INSERTABLE CARDIAC MONITOR  2015    [employer] REVEAL LINQ MODEL #[id number] MRI CONDTIONAL OK 3T. IMMEDIATELY POST IMPLANT    OTHER SURGICAL HISTORY  09/10/15    removal of interstim    [place] COLSC FLX W/REMOVAL LESION BY HOT BX FORCEPS N/A 4/10/2017    COLONOSCOPY POLYPECTOMY HOT BIOPSY performed by

## 2024-11-03 NOTE — PLAN OF CARE
Problem: Respiratory - Adult  Goal: Achieves optimal ventilation and oxygenation  Outcome: Progressing  Flowsheets (Taken 11/3/2024 9722)  Achieves optimal ventilation and oxygenation:   Assess for changes in respiratory status   Position to facilitate oxygenation and minimize respiratory effort   Initiate smoking cessation protocol as indicated   Assess the need for suctioning and aspirate as needed   Respiratory therapy support as indicated   Assess and instruct to report shortness of breath or any respiratory difficulty   Encourage broncho-pulmonary hygiene including cough, deep breathe, incentive spirometry   Oxygen supplementation based on oxygen saturation or arterial blood gases   Assess for changes in mentation and behavior

## 2024-11-03 NOTE — PLAN OF CARE
Problem: Chronic Conditions and Co-morbidities  Goal: Patient's chronic conditions and co-morbidity symptoms are monitored and maintained or improved  Outcome: Progressing     Problem: Discharge Planning  Goal: Discharge to home or other facility with appropriate resources  11/3/2024 0911 by Rhonda Rincon RN  Outcome: Progressing  11/3/2024 0044 by Leanne Byers RN  Outcome: Progressing     Problem: Safety - Adult  Goal: Free from fall injury  Outcome: Progressing     Problem: Respiratory - Adult  Goal: Achieves optimal ventilation and oxygenation  11/3/2024 0911 by Rhonda Rincon RN  Outcome: Progressing  11/3/2024 0522 by Siobhan Kim RCP  Outcome: Progressing  Flowsheets (Taken 11/3/2024 0522)  Achieves optimal ventilation and oxygenation:   Assess for changes in respiratory status   Position to facilitate oxygenation and minimize respiratory effort   Initiate smoking cessation protocol as indicated   Assess the need for suctioning and aspirate as needed   Respiratory therapy support as indicated   Assess and instruct to report shortness of breath or any respiratory difficulty   Encourage broncho-pulmonary hygiene including cough, deep breathe, incentive spirometry   Oxygen supplementation based on oxygen saturation or arterial blood gases   Assess for changes in mentation and behavior

## 2024-11-03 NOTE — DISCHARGE INSTRUCTIONS
You came in initially with chest pain and jaw pain. You were evaluated by neurology and were thought to have temporomandibular joint dysfunction. You were started  on gabapentin 100 mg twice daily. Please start taking it and follow-up with neurologist in four weeks as listed. Additionally, for chest pain you were evaluated by cardiology team and there is no plan for any intervention as of now. Please follow up with your cardiologist in 2 to 4 weeks.  -Also your blood glucose was uncontrolled. Continue taking 50 U  of Lantus in the morning and 40 units of Lantus in the night as well as sliding scale. Please make make an appointment with the endocrinologist as listed in the chart for insulin dosage adjustment.  -Please follow up with the PCP within 5 to 7 days for continuity of care  -No other changes have been made to you prior medication list.    If you begin to experience any symptoms such as chest pain shortness of breath nausea vomiting dizziness drowsiness abdominal pain loss of consciousness or any other symptoms you find concerning please come to the ED for follow-up evaluation.    If you have been given medication please take them as prescribed. Do not take more medication than recommended at any given time.     Please follow-up with your primary care provider within 5 to 7 days for continued care.     Please feel free return to the hospital if your symptoms worsen or any new concerning symptoms develop.  Follow-up with your primary care physician as needed for all other the concerns.

## 2024-11-03 NOTE — PROGRESS NOTES
[facility]  Internal Medicine Teaching Residency Program  Inpatient Daily Progress Note  ______________________________________________________________________________    Patient: Mariangel Abreu  YOB: 1950   MRN:[id number]    Acct: [id number]     Room: 0108/0108-01  Admit date: 11/1/2024  Today's date: 11/03/24  Number of days in the hospital: 2    SUBJECTIVE   Admitting Diagnosis: TIA (transient ischemic attack)  CC: Jaw and neck pain  Pt examined at bedside. Chart & results reviewed.     -No significant overnight event  -patient stated her pain in the jaw in the chest is better.  -Patient is yet to be evaluated by cardiology. Awaiting recommendation  -As per neurology, likely left TMJ. Cervical x-ray was ordered which revealed multilevel degenerative cervical disc disease. Patient's carbamazepine was discontinued and patient was started on Neurontin hundred milligrams PO BID.    -Labs this a.m. revealed sodium 136, potassium four, bicarb 26, creatinine 1.0. CRP and ESR down training glucose this a.m. was 140. Patient received 90 units of Lantus and 14 units of left probe during the day. Due to sugars being on the lower end, patient was given 20 units of Lantus in the morning. Plan to follow-up during the day and adjust insulin accordingly.    BRIEF HISTORY     The patient is a pleasant 73 y.o. 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  According to the patient she was in usual state of health then since last 1 to 2 days she has new onset facial pain that starts around the angle of the mandible and goes upward toward the forehead associated with difficulty in opening and closing  recorder due to CVA 2 years back  -Patient complained of chest pain radiating to jaw. EKG and troponins unremarkable. Consulted cardiology due to recent PCI intervention and hide a stress test and July 2024.  -Continue GDMT, Ranexa, Lipitor and Brilinta     Anxiety and depression  -At home patient takes escitalopram 30 mg  -Currently denied symptoms of depression and anxiety.  -Continue same treatment.     DVT ppx: heparin  GI ppx: Not indicated     PT/OT/SW: Consulted  Discharge Planning: To be decided    Genia Ramos MD  Internal Medicine Resident, PGY-2  [facility]; [place], [place]  11/3/2024, 10:50 AM

## 2024-11-04 ENCOUNTER — CARE COORDINATION (OUTPATIENT)
Dept: CARE COORDINATION | Age: 74
End: 2024-11-04

## 2024-11-04 DIAGNOSIS — G45.9 TIA (TRANSIENT ISCHEMIC ATTACK): Primary | ICD-10-CM

## 2024-11-04 LAB — GLUCOSE BLD-MCNC: >600 MG/DL (ref 65–105)

## 2024-11-04 PROCEDURE — 1111F DSCHRG MED/CURRENT MED MERGE: CPT | Performed by: NURSE PRACTITIONER

## 2024-11-04 NOTE — DISCHARGE SUMMARY
[facility]     Department of Internal Medicine - Staff Internal Medicine Teaching Service    INPATIENT DISCHARGE SUMMARY      Patient Identification:  Mariangel Abreu is a 73 y.o. female.  :  1950  MRN: [id number]     Acct: [id number]   PCP: Carla Chua APRN - CNP  Admit Date:  2024  Discharge date and time: 11/3/2024  3:26 PM   Attending Provider: No att. providers found                                     ACTIVE DISCHARGE DIAGNOSES     Hospital Problem Lists:  Principal Problem:    TIA (transient ischemic attack)  Active Problems:    Chronic use of opiate for therapeutic purpose    Facial pain, acute    Type 2 diabetes mellitus with hyperglycemia, with long-term current use of insulin (HCC)    Uncontrolled blood glucose    Elevated erythrocyte sedimentation rate    Elevated C-reactive protein (CRP)    Chest pain    Sprain of temporomandibular joint or ligament    Neck pain  Resolved Problems:    * No resolved hospital problems. *      HOSPITAL STAY     Brief Inpatient course:   Mariangel Abreu is a 73 y.o. female who was admitted for the management of TIA (transient ischemic attack), presented to the emergency department with left-sided jaw pain associated with abnormal sensation to the left Hernando side of the body.  ED course:  Patient was in usual state of health then since 2 to 3 days patient started developing jaw pain and abnormal sensation of left side of the body, initial workup in the ED showed glucose 550 with normotension.  Neurology was consulted and there was a concern of TIA versus giant cell arteritis and patient was given carbamazepine and steroids.  Internal medicine consulted for management of hyperglycemia and uncontrolled diabetes mellitus.    Medicine floor course:  Patient's jaw pain improved, patient also complained of noncardiac chest pain but due to recent history of C with PCI in 2024 cardiology was consulted for cardiac

## 2024-11-04 NOTE — PROGRESS NOTES
CLINICAL PHARMACY NOTE: MEDS TO BEDS    Total # of Prescriptions Filled: 1   The following medications were delivered to the patient:  gabapentin    Additional Documentation:  No copay  Writer delivered to bedside

## 2024-11-04 NOTE — CARE COORDINATION
Care Transitions Note    Initial Call - Call within 2 business days of discharge: Yes    Patient Current Location:  Home: [address]  [facility] [address]    Care Transition Nurse contacted the patient by telephone to perform post hospital discharge assessment, verified name and  as identifiers. Provided introduction to self, and explanation of the Care Transition Nurse role.     Patient: Mariangel Abreu    Patient : 1950   MRN: [id number]    Reason for Admission: TIA  Discharge Date: 11/3/24  RURS: Readmission Risk Score: 19.7      Last Discharge Facility       Date Complaint Diagnosis Description Type Department Provider    24 Jaw Pain; Neck Pain Chest pain, unspecified type ... ED to Hosp-Admission (Discharged) (ADMITTED) [facility] Rahul Zheng MD; Francine Sanz.            Was this an external facility discharge? No    Additional needs identified to be addressed with provider   No needs identified             Method of communication with provider: none.    Patients top risk factors for readmission: medical condition-TIA    Interventions to address risk factors:   Education: Medications as ordered, fall precautions, follow up with PCP and specialists.     Care Summary Note: Spoke with patient for initial 24 hour follow up.  Patient had come to the hospital c/o jaw pain to left jaw and abnormal sensation to the left side of her body.  She was admitted for TIA, work up did not show stroke. Patient then had some chest pain as well, was seen by cardiology but this has resolved.  Today, she said her legs are \"bouncing all over\".  She denies any issues with her jaw or having chest pain today.  While she was in the hospital she had complained of excessive thirst and polyuria, blood sugar was 550 and she was started on insulin drip.  Today, she denies being thirsty like she was before, urinating normal, states she has not checked her blood sugars yet today.  Patient was out and about

## 2024-11-05 LAB
ALBUMIN PERCENT: 55 % (ref 45–65)
ALBUMIN SERPL-MCNC: 3.3 G/DL (ref 3.2–5.2)
ALPHA 2 PERCENT: 15 % (ref 6–13)
ALPHA1 GLOB SERPL ELPH-MCNC: 0.3 G/DL (ref 0.1–0.4)
ALPHA1 GLOB SERPL ELPH-MCNC: 4 % (ref 3–6)
ALPHA2 GLOB SERPL ELPH-MCNC: 0.9 G/DL (ref 0.5–0.9)
B-GLOBULIN SERPL ELPH-MCNC: 0.8 G/DL (ref 0.5–1.1)
B-GLOBULIN SERPL ELPH-MCNC: 14 % (ref 11–19)
GAMMA GLOB SERPL ELPH-MCNC: 0.7 G/DL (ref 0.5–1.5)
GAMMA GLOBULIN %: 12 % (ref 9–20)
PATHOLOGIST: ABNORMAL
PROT PATTERN SERPL ELPH-IMP: ABNORMAL
PROT SERPL-MCNC: 6 G/DL (ref 6.6–8.7)
TOTAL PROT. SUM,%: 100 % (ref 98–102)
TOTAL PROT. SUM: 6 G/DL (ref 6.3–8.2)

## 2024-11-07 ENCOUNTER — TELEPHONE (OUTPATIENT)
Dept: PRIMARY CARE CLINIC | Age: 74
End: 2024-11-07

## 2024-11-07 NOTE — TELEPHONE ENCOUNTER
Malu  with [facility] called in and wanted to know if this pt can get another referral for a [facility]HA and Nursing. Please advise

## 2024-11-08 ENCOUNTER — CARE COORDINATION (OUTPATIENT)
Dept: CARE COORDINATION | Age: 74
End: 2024-11-08

## 2024-11-08 NOTE — CARE COORDINATION
Care Transitions Note    Follow Up Call     Patient Current Location:  Home: [address]  [place] [address]    N Care Coordinator contacted the patient by telephone. Verified name and  as identifiers.    Additional needs identified to be addressed with provider   Writer called [facility] pt was last seen by them Dec 2022 would need a new referral for service please send referral to [facility]                  Method of communication with provider: chart routing.    Care Summary Note: Writer spoke to pt for follow up transitional. She stated that she has moved into her new apartment she has her granddaughter is helping her.She moved from [place] to [place].  She is not having chest pain today she stated she is not pushing herself.  She reports no new or worsening issues. No vision issues today .  She reports jaw is okay it is still sore she has been taking gabapentin she stated that she had left jaw pain and neck pain it sent her to ER. She reports she reports her legs are the same, she is still  having the same issue with left leg-she uses her scooter. She has not had any falls since coming home. Pt reports she does go to cardiac rehab she stated she was told to just come in. She asked them before she moved to take her off the list she said she she will be walking in on Monday at 8 AM she lives within a block and will be using her scooter.     She reports her BS today was 360 before meals around 10:00 AM pt rechecked BS went down to 200 she reports doctor know and is working with her. Pt is working with a dietician. Pt reports she drinks diet pepsi and is urinating and moving her bowels.  Writer advised to keep to keep BS log and let provider know if she is having issues.     Pt reports she is working with [facility]. Writer reports she had [facility] before  hospital. She stated she is being hooked up with someone to come out to see her. Writer called [facility] spoke to Kristal she stated they last saw pt

## 2024-11-11 DIAGNOSIS — I25.83 CORONARY ARTERY DISEASE DUE TO LIPID RICH PLAQUE: ICD-10-CM

## 2024-11-11 DIAGNOSIS — Z79.4 TYPE 2 DIABETES MELLITUS WITH HYPERGLYCEMIA, WITH LONG-TERM CURRENT USE OF INSULIN (HCC): Primary | ICD-10-CM

## 2024-11-11 DIAGNOSIS — E11.65 TYPE 2 DIABETES MELLITUS WITH HYPERGLYCEMIA, WITH LONG-TERM CURRENT USE OF INSULIN (HCC): Primary | ICD-10-CM

## 2024-11-11 DIAGNOSIS — I50.32 CHRONIC DIASTOLIC CONGESTIVE HEART FAILURE (HCC): ICD-10-CM

## 2024-11-11 DIAGNOSIS — I25.10 CORONARY ARTERY DISEASE DUE TO LIPID RICH PLAQUE: ICD-10-CM

## 2024-11-13 ENCOUNTER — OFFICE VISIT (OUTPATIENT)
Dept: PRIMARY CARE CLINIC | Age: 74
End: 2024-11-13

## 2024-11-13 VITALS
OXYGEN SATURATION: 89 % | WEIGHT: 225 LBS | DIASTOLIC BLOOD PRESSURE: 58 MMHG | TEMPERATURE: 97.2 F | BODY MASS INDEX: 41.3 KG/M2 | SYSTOLIC BLOOD PRESSURE: 89 MMHG | HEART RATE: 77 BPM

## 2024-11-13 DIAGNOSIS — I25.83 CORONARY ARTERY DISEASE DUE TO LIPID RICH PLAQUE: ICD-10-CM

## 2024-11-13 DIAGNOSIS — Z79.4 TYPE 2 DIABETES MELLITUS WITH DIABETIC POLYNEUROPATHY, WITH LONG-TERM CURRENT USE OF INSULIN (HCC): Primary | ICD-10-CM

## 2024-11-13 DIAGNOSIS — E11.42 TYPE 2 DIABETES MELLITUS WITH DIABETIC POLYNEUROPATHY, WITH LONG-TERM CURRENT USE OF INSULIN (HCC): Primary | ICD-10-CM

## 2024-11-13 DIAGNOSIS — Z09 HOSPITAL DISCHARGE FOLLOW-UP: ICD-10-CM

## 2024-11-13 DIAGNOSIS — I25.10 CORONARY ARTERY DISEASE DUE TO LIPID RICH PLAQUE: ICD-10-CM

## 2024-11-13 DIAGNOSIS — I50.32 CHRONIC DIASTOLIC CONGESTIVE HEART FAILURE (HCC): ICD-10-CM

## 2024-11-13 RX ORDER — INSULIN GLARGINE 100 [IU]/ML
INJECTION, SOLUTION SUBCUTANEOUS
Qty: 15 ADJUSTABLE DOSE PRE-FILLED PEN SYRINGE | Refills: 1 | Status: SHIPPED | OUTPATIENT
Start: 2024-11-13

## 2024-11-13 RX ORDER — BUDESONIDE AND FORMOTEROL FUMARATE DIHYDRATE 160; 4.5 UG/1; UG/1
2 AEROSOL RESPIRATORY (INHALATION) 2 TIMES DAILY
COMMUNITY
Start: 2024-10-28 | End: 2025-11-13

## 2024-11-13 RX ORDER — INSULIN ASPART 100 [IU]/ML
INJECTION, SOLUTION INTRAVENOUS; SUBCUTANEOUS
Qty: 5 ADJUSTABLE DOSE PRE-FILLED PEN SYRINGE | Refills: 11 | Status: SHIPPED | OUTPATIENT
Start: 2024-11-13

## 2024-11-13 RX ORDER — NYSTATIN 100000 [USP'U]/G
POWDER TOPICAL
Qty: 1 EACH | Refills: 0 | Status: SHIPPED | OUTPATIENT
Start: 2024-11-13

## 2024-11-13 NOTE — PATIENT INSTRUCTIONS
5 units with each meal plus IF<139 NO INSULIN; 140-199: 4 UN;200-249: 8 UN;250-299:10 UN;300-349:12 UN;350-400:16 UN;ABOVE 400: 20 UNIT(S)

## 2024-11-13 NOTE — PROGRESS NOTES
MCG/ACT AERO Inhale 2 puffs into the lungs 2 times daily      insulin glargine (LANTUS SOLOSTAR) 100 UNIT/ML injection pen Inject 50 Units into the skin every morning AND 45 Units nightly. 15 Adjustable Dose Pre-filled Pen Syringe 1    insulin aspart (NOVOLOG FLEXPEN) 100 UNIT/ML injection pen 5 units with each meal plus IF<139 NO INSULIN; 140-199-4 UN;200-249-8 UN;250-299-10 UN;300-349-12 UN;350-400=16 UN;ABOVE 400: 20 UNIT(S) 5 Adjustable Dose Pre-filled Pen Syringe 11    empagliflozin (JARDIANCE) 10 MG tablet Take 1 tablet by mouth daily 90 tablet 1    nystatin (MYCOSTATIN) 344137 UNIT/GM powder Apply 3 times daily. 1 each 0    gabapentin (NEURONTIN) 100 MG capsule Take 1 capsule by mouth 2 times daily for 30 days. 60 capsule 0    oxyCODONE-acetaminophen (PERCOCET) 5-325 MG per tablet Take 1 tablet by mouth every 8 hours as needed for Pain for up to 30 days. Intended supply: 30 days. 90 tablet 0    ranolazine (RANEXA) 500 MG extended release tablet Take 1 tablet by mouth 2 times daily 60 tablet 3    isosorbide mononitrate (IMDUR) 30 MG extended release tablet Take 2 tablets by mouth daily 30 tablet 3    rosuvastatin (CRESTOR) 40 MG tablet TAKE 1 TABLET BY MOUTH NIGHTLY 30 tablet 3    ticagrelor (BRILINTA) 90 MG TABS tablet TAKE 1 TABLET BY MOUTH 2 TIMES DAILY 60 tablet 1    alogliptin (NESINA) 12.5 MG TABS tablet Take 1 tablet by mouth daily 30 tablet 6    amLODIPine (NORVASC) 2.5 MG tablet Take 1 tablet by mouth daily 30 tablet 3    metFORMIN (GLUCOPHAGE-XR) 500 MG extended release tablet Take 1 tablet by mouth daily (with breakfast) 90 tablet 1    topiramate (TOPAMAX) 100 MG tablet GIVE 1 TABLET BY MOUTH ONE TIME A DAY FOR SEIZURES 30 tablet 11    pantoprazole (PROTONIX) 40 MG tablet GIVE 1 TABLET BY MOUTH TWO TIMES A DAY FOR INDIGESTION 60 tablet 11    oxyBUTYnin (DITROPAN-XL) 5 MG extended release tablet GIVE 1 TABLET BY MOUTH ONE TIME A DAY FOR BLADDER SPASM 30 tablet 11    midodrine (PROAMATINE) 2.5 MG

## 2024-11-14 ENCOUNTER — CARE COORDINATION (OUTPATIENT)
Dept: CARE COORDINATION | Age: 74
End: 2024-11-14

## 2024-11-14 NOTE — CARE COORDINATION
be.  Patient to follow up with endocrinology, offered assistance with scheduling which she is agreeable to, will route to  for scheduling.  Patient is denying any other needs or issues at this time, expressed if any new concerns to feel free to reach out.     Plan of care updates since last contact:  none       Advance Care Planning:   Does patient have an Advance Directive: reviewed during previous call, see note. .    Medication Review:  Medications changed since last call, reviewed today.       Assessments:  Care Transitions Subsequent and Final Call    Schedule Follow Up Appointment with PCP: Completed  Subsequent and Final Calls  Do you have any ongoing symptoms?: Yes  Onset of Patient-reported symptoms: In the past 7 days  Patient-reported symptoms: Other  Have your medications changed?: Yes  Patient Reports: Adjustments made to Insulins, both short and long acting  Do you have any questions related to your medications?: No  Do you currently have any active services?: Yes  Are you currently active with any services?: Home Health  Do you have any needs or concerns that I can assist you with?: No  Identified Barriers: Lack of Education  Care Transitions Interventions  Other Interventions:              Follow Up Appointment:   Reviewed upcoming appointment(s).  Future Appointments         Provider Specialty Dept Phone    11/18/2024 3:15 PM Ángela Collier APRN - NP Cardiology [phone]    11/20/2024 2:20 PM Karen Cardoso APRN - CNP Pain Management [phone]    12/3/2024 10:00 AM Vivian Owusu RN Diabetes Services [phone]    12/11/2024 11:45 AM Carla Chua APRN - CNP Primary Care [phone]    1/15/2025 3:00 PM Carla Chua APRN - CNP Primary Care [phone]    3/13/2025 10:20 AM Rhett Moss MD Neurology [phone]            Care Transition Nurse provided contact information.  Plan for follow-up call in 6-10 days based on severity of symptoms and risk

## 2024-11-15 ENCOUNTER — CARE COORDINATION (OUTPATIENT)
Dept: CARE COORDINATION | Age: 74
End: 2024-11-15

## 2024-11-15 NOTE — CARE COORDINATION
Per CTN request, writer called Endocrinology to schedule appointment. Writer was prompted to leave a message due to staff unavailable.  Msg left on machine to contact patient for appointment. Referral in Epic,name,, phone # provided and my number also provided for call back.  Will advise CTN   CC f/u .24

## 2024-11-20 ENCOUNTER — HOSPITAL ENCOUNTER (OUTPATIENT)
Dept: PAIN MANAGEMENT | Age: 74
Discharge: HOME | End: 2024-11-20
Payer: MEDICARE

## 2024-11-20 ENCOUNTER — CARE COORDINATION (OUTPATIENT)
Dept: CARE COORDINATION | Age: 74
End: 2024-11-20

## 2024-11-20 VITALS — HEIGHT: 62 IN | WEIGHT: 225 LBS | BODY MASS INDEX: 41.41 KG/M2

## 2024-11-20 DIAGNOSIS — M54.2 NECK PAIN: ICD-10-CM

## 2024-11-20 DIAGNOSIS — M50.30 DDD (DEGENERATIVE DISC DISEASE), CERVICAL: Chronic | ICD-10-CM

## 2024-11-20 DIAGNOSIS — Z79.891 CHRONIC USE OF OPIATE FOR THERAPEUTIC PURPOSE: ICD-10-CM

## 2024-11-20 DIAGNOSIS — M46.1 SACROILIITIS, NOT ELSEWHERE CLASSIFIED (HCC): Primary | Chronic | ICD-10-CM

## 2024-11-20 DIAGNOSIS — M54.16 LUMBAR RADICULOPATHY, CHRONIC: ICD-10-CM

## 2024-11-20 DIAGNOSIS — G89.29 CHRONIC BILATERAL LOW BACK PAIN WITH BILATERAL SCIATICA: Chronic | ICD-10-CM

## 2024-11-20 DIAGNOSIS — M54.41 CHRONIC BILATERAL LOW BACK PAIN WITH BILATERAL SCIATICA: Chronic | ICD-10-CM

## 2024-11-20 DIAGNOSIS — M54.42 CHRONIC BILATERAL LOW BACK PAIN WITH BILATERAL SCIATICA: Chronic | ICD-10-CM

## 2024-11-20 DIAGNOSIS — M47.817 LUMBOSACRAL SPONDYLOSIS WITHOUT MYELOPATHY: Chronic | ICD-10-CM

## 2024-11-20 PROCEDURE — 99213 OFFICE O/P EST LOW 20 MIN: CPT | Performed by: NURSE PRACTITIONER

## 2024-11-20 PROCEDURE — 99213 OFFICE O/P EST LOW 20 MIN: CPT

## 2024-11-20 RX ORDER — OXYCODONE AND ACETAMINOPHEN 5; 325 MG/1; MG/1
1 TABLET ORAL EVERY 8 HOURS PRN
Qty: 90 TABLET | Refills: 0 | Status: SHIPPED | OUTPATIENT
Start: 2024-11-20 | End: 2024-12-20

## 2024-11-20 ASSESSMENT — ENCOUNTER SYMPTOMS
COUGH: 0
BACK PAIN: 1
SHORTNESS OF BREATH: 0
BOWEL INCONTINENCE: 0
CONSTIPATION: 0

## 2024-11-20 ASSESSMENT — PAIN SCALES - GENERAL: PAINLEVEL_OUTOF10: 10

## 2024-11-20 NOTE — CARE COORDINATION
Care Transitions Note    Follow Up Call     Attempted to reach patient for transitions of care follow up.  Unable to reach patient.      Outreach Attempts:# 1  HIPAA compliant voicemail left for patient.     Care Summary Note: 1st attempt     Follow Up Appointment:   Future Appointments         Provider Specialty Dept Phone    11/20/2024 2:20 PM Karen Cardoso APRN - CNP Pain Management [phone]    12/3/2024 10:00 AM Vivian Owusu RN Diabetes Services [phone]    12/11/2024 11:45 AM Carla Chua APRN - CNP Primary Care [phone]    1/15/2025 3:00 PM Carla Chua APRN - CNP Primary Care [phone]    3/13/2025 10:20 AM Rhett Moss MD Neurology [phone]            Plan for follow-up on next business day.  based on severity of symptoms and risk factors. Plan for next call: how are blood sugars? How is rash? Clearing up? When is endocrinology scheduled?          Mari Johns LPN

## 2024-11-20 NOTE — PROGRESS NOTES
Chief Complaint   Patient presents with    Back Pain     Med Refill - Percocet          PMH  Hx of atrial fibrillation, CKD stage III, CHF, COPD, DM CVA MI with 4 stents and on brilinta  chronic hypoxic respiratory failure and HIGINIO on CPAP - has narcan 4/16/2024     Pt c/o low back pain that radiates down legs. No known injury or surgery to the area with onset more than 1 year ago and has progressively worsened  MRI 10- with multilevel degenerative changes and Right paracentral disc extrusion L4-5 narrowing the right lateral recess.   She is not interested in seeing NS for opinion and declines to update imaging even though reporting worsening leg weakness and falls.    Patient states that she had a bad experience with an injection in the past and is not interested in any interventional pain procedures     Recent hospital admission for TIA but work up did not show stroke. She was dx with TMJ.    She has moved to a new apartment and states he pills were taken during the move.     Back Pain  This is a chronic problem. The current episode started more than 1 year ago. The problem occurs constantly. The problem has been gradually worsening since onset. The pain is present in the lumbar spine. The quality of the pain is described as aching, burning, cramping, shooting and stabbing. The pain does not radiate. The pain is at a severity of 10/10. The pain is severe. The pain is Worse during the night. The symptoms are aggravated by standing. Associated symptoms include weakness. Pertinent negatives include no bladder incontinence, bowel incontinence, chest pain, fever, numbness or tingling. She has tried chiropractic manipulation, heat, muscle relaxant and NSAIDs for the symptoms.      Patient denies any new neurological symptoms. No bowel or bladder incontinence, no weakness, and no falling.    Pill count:  0 Percocet pills with patient; pt states that she was recently in the hospital and \"someone walked off  "Assessment:    Healthy 12 m.o. male child. Here for Well  with no concerns and no significant abnormal findings on exam    Assessment & Plan  Encounter for well child visit at 12 months of age         Encounter for immunization    Orders:    VARICELLA VACCINE IM/SQ    MMR VACCINE IM/SQ    HEPATITIS A VACCINE PEDIATRIC / ADOLESCENT 2 DOSE IM    Screening for chemical poisoning and contamination    Orders:    POCT Lead    Screening for iron deficiency anemia    Orders:    POCT hemoglobin fingerstick    Encounter for prophylactic administration of fluoride    Orders:    sodium fluoride (SPARKLE V) 5% dental varnish MISC 1 Application    Retractile testis         Results for orders placed or performed in visit on 09/24/24   POCT Lead   Result Value Ref Range    Lead <3.3    POCT hemoglobin fingerstick   Result Value Ref Range    Hemoglobin 11.3          Plan:    1. Anticipatory guidance discussed.  Gave handout on well-child issues at this age.  Specific topics reviewed: avoid potential choking hazards (large, spherical, or coin shaped foods) , avoid putting to bed with bottle, avoid small toys (choking hazard), car seat issues, including proper placement and transition to toddler seat at 20 pounds, caution with possible poisons (including pills, plants, and cosmetics), child-proof home with cabinet locks, outlet plugs, window guards, and stair safety bingham, make middle-of-night feeds \"brief and boring\", never leave unattended, and place in crib before completely asleep.    2. Development: appropriate for age    3. Immunizations today: per orders  Immunizations are up to date.  Discussed with: mother    4. Follow-up visit in 3 months for next well child visit, or sooner as needed.          History of Present Illness   Subjective:     Julian Foreman is a 12 m.o. male who is brought in for this well child visit.  Encouraged to cut down milk volume to 16oz per day    Current Issues:  Current " "concerns include none.  Improved motor skills- cruising and walking with some support    Well Child Assessment:  History was provided by the mother and father.   Nutrition  Types of milk consumed include cow's milk. 24 ounces of milk or formula are consumed every 24 hours. Types of cereal consumed include corn, oat and rice. Types of intake include cereals, fish, eggs, fruits, meats, vegetables and juices. There are no difficulties with feeding.   Dental  The patient does not have a dental home. The patient has no teething symptoms. Tooth eruption is in progress.  Elimination  Elimination problems do not include colic, constipation, diarrhea or gas.   Sleep  The patient sleeps in his crib. Child falls asleep while on own.   Safety  Home is child-proofed? yes. There is no smoking in the home. Home has working smoke alarms? yes. Home has working carbon monoxide alarms? yes. There is an appropriate car seat in use.   Screening  Immunizations are up-to-date. There are no risk factors for hearing loss. There are risk factors for tuberculosis. There are risk factors for lead toxicity.   Social  The caregiver enjoys the child. Childcare is provided at child's home and . The childcare provider is a parent or  provider.     Birth History    Birth     Length: 18.5\" (47 cm)     Weight: 2705 g (5 lb 15.4 oz)     HC 32 cm (12.6\")    Apgar     One: 9     Five: 9    Discharge Weight: 2575 g (5 lb 10.8 oz)    Delivery Method: Vaginal, Spontaneous    Gestation Age: 37 wks    Duration of Labor: 2nd: 1h 20m    Days in Hospital: 3.0    Hospital Name: [facility] Location: [place], [place]     The following portions of the patient's history were reviewed and updated as appropriate: allergies, current medications, past family history, past medical history, past social history, past surgical history, and problem list.             Objective:     Growth parameters are noted and are " "appropriate for age.    Wt Readings from Last 1 Encounters:   09/24/24 10.9 kg (24 lb 2 oz) (87%, Z= 1.11)*     * Growth percentiles are based on WHO (Boys, 0-2 years) data.     Ht Readings from Last 1 Encounters:   09/24/24 29.53\" (75 cm) (34%, Z= -0.42)*     * Growth percentiles are based on WHO (Boys, 0-2 years) data.          Vitals:    09/24/24 1458   Weight: 10.9 kg (24 lb 2 oz)   Height: 29.53\" (75 cm)   HC: 48.2 cm (19\")          Physical Exam  Vitals and nursing note reviewed.   Constitutional:       General: He is active.      Appearance: Normal appearance. He is well-developed.   HENT:      Head: Normocephalic.      Right Ear: Tympanic membrane and ear canal normal.      Left Ear: Tympanic membrane and ear canal normal.      Nose: No congestion.      Mouth/Throat:      Mouth: Mucous membranes are moist.      Pharynx: No oropharyngeal exudate or posterior oropharyngeal erythema.   Eyes:      General:         Right eye: No discharge.         Left eye: No discharge.      Conjunctiva/sclera: Conjunctivae normal.      Pupils: Pupils are equal, round, and reactive to light.   Cardiovascular:      Rate and Rhythm: Normal rate and regular rhythm.      Pulses: Normal pulses.      Heart sounds: Normal heart sounds. No murmur heard.  Pulmonary:      Effort: Pulmonary effort is normal. No respiratory distress.      Breath sounds: Normal breath sounds. No wheezing or rales.   Abdominal:      General: Abdomen is flat.      Palpations: Abdomen is soft. There is no mass.      Tenderness: There is no abdominal tenderness.      Hernia: No hernia is present.   Genitourinary:     Penis: Normal.       Testes: Normal.      Comments: R testis at neck of scrotum, can be milked down  Musculoskeletal:         General: No swelling or tenderness. Normal range of motion.      Cervical back: Neck supple.   Lymphadenopathy:      Cervical: No cervical adenopathy.   Skin:     General: Skin is warm and dry.      Capillary Refill: " Capillary refill takes less than 2 seconds.      Findings: No rash.   Neurological:      General: No focal deficit present.      Mental Status: He is alert.      Cranial Nerves: No cranial nerve deficit.      Motor: No weakness.         Review of Systems   Constitutional:  Negative for chills and fever.   HENT:  Negative for ear pain and sore throat.    Eyes:  Negative for pain and redness.   Respiratory:  Negative for cough and wheezing.    Cardiovascular:  Negative for chest pain and leg swelling.   Gastrointestinal:  Negative for abdominal pain, constipation, diarrhea and vomiting.   Genitourinary:  Negative for frequency and hematuria.   Musculoskeletal:  Negative for gait problem and joint swelling.   Skin:  Negative for color change and rash.   Neurological:  Negative for seizures and syncope.   All other systems reviewed and are negative.

## 2024-11-21 ENCOUNTER — CARE COORDINATION (OUTPATIENT)
Dept: CARE COORDINATION | Age: 74
End: 2024-11-21

## 2024-11-21 NOTE — CARE COORDINATION
Care Transitions Note    Follow Up Call     Patient Current Location:  Home: [address]  [facility] [address] Care Coordinator contacted the patient by telephone. Verified name and  as identifiers.    Additional needs identified to be addressed with provider   No needs identified                 Method of communication with provider: none.    Care Summary Note: Writer spoke with Mariangel for a follow up care transitions call. She states she is doing okay overall. She reports her glucose is still elevated. Reviewed changes to her insulin-she reports she is taking it correctly. She reports she is not urinating as frequently. Her painful abdominal rash that she had has cleared up well and it not bothering her at all. She continues to follow with [facility] and states her BP has been \"okay\" but did not have numbers available. He states she still has not received a call from [facility] endocrinology. Writer called and has to leave a message with patient's info-noted that Michelle RAMOS also attempted to get her scheduled and had to leave a message as well. Mariangel denies having any new needs or concerns at this time.     Plan of care updates since last contact:  Review of patient management of conditions/medications:         Advance Care Planning:   Does patient have an Advance Directive: reviewed during previous call, see note. .    Medication Review:  No changes since last call.     Remote Patient Monitoring:  Offered patient enrollment in the Remote Patient Monitoring (RPM) program for in-home monitoring: Yes, but did not enroll at this time:   .    Assessments:  Care Transitions Subsequent and Final Call    Subsequent and Final Calls  Care Transitions Interventions  Other Interventions:              Follow Up Appointment:   Reviewed upcoming appointment(s). and ARPAN appointment attended as scheduled   Future Appointments         Provider Specialty Dept Phone    12/3/2024 10:00 AM Vivian Owusu, REUBEN Diabetes

## 2024-11-26 RX ORDER — BUDESONIDE AND FORMOTEROL FUMARATE DIHYDRATE 160; 4.5 UG/1; UG/1
AEROSOL RESPIRATORY (INHALATION)
Qty: 1 EACH | Refills: 2 | Status: SHIPPED | OUTPATIENT
Start: 2024-11-26 | End: 2025-11-26

## 2024-11-27 ENCOUNTER — APPOINTMENT (OUTPATIENT)
Dept: CT IMAGING | Age: 74
End: 2024-11-27
Payer: MEDICARE

## 2024-11-27 ENCOUNTER — HOSPITAL ENCOUNTER (EMERGENCY)
Age: 74
Discharge: HOME | End: 2024-11-27
Attending: EMERGENCY MEDICINE
Payer: MEDICARE

## 2024-11-27 VITALS
RESPIRATION RATE: 18 BRPM | OXYGEN SATURATION: 93 % | DIASTOLIC BLOOD PRESSURE: 74 MMHG | HEIGHT: 62 IN | TEMPERATURE: 97.8 F | BODY MASS INDEX: 41.59 KG/M2 | SYSTOLIC BLOOD PRESSURE: 98 MMHG | WEIGHT: 226 LBS | HEART RATE: 73 BPM

## 2024-11-27 DIAGNOSIS — R10.84 GENERALIZED ABDOMINAL PAIN: ICD-10-CM
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
## 2024-11-27 DIAGNOSIS — S09.90XA CLOSED HEAD INJURY, INITIAL ENCOUNTER: ICD-10-CM

## 2024-11-27 LAB
ALBUMIN SERPL-MCNC: 4.2 G/DL (ref 3.5–5.2)
ALP SERPL-CCNC: 75 U/L (ref 35–104)
ALT SERPL-CCNC: 13 U/L (ref 10–35)
ANION GAP SERPL CALCULATED.3IONS-SCNC: 10 MMOL/L (ref 9–16)
AST SERPL-CCNC: 20 U/L (ref 10–35)
BASOPHILS # BLD: 0.1 K/UL (ref 0–0.2)
BASOPHILS NFR BLD: 1 % (ref 0–2)
BILIRUB SERPL-MCNC: 0.4 MG/DL (ref 0–1.2)
BUN SERPL-MCNC: 16 MG/DL (ref 8–23)
CALCIUM SERPL-MCNC: 9.7 MG/DL (ref 8.6–10.4)
CHLORIDE SERPL-SCNC: 94 MMOL/L (ref 98–107)
CO2 SERPL-SCNC: 29 MMOL/L (ref 20–31)
CREAT SERPL-MCNC: 1 MG/DL (ref 0.7–1.2)
EOSINOPHIL # BLD: 0.1 K/UL (ref 0–0.4)
EOSINOPHILS RELATIVE PERCENT: 1 % (ref 0–4)
ERYTHROCYTE [DISTWIDTH] IN BLOOD BY AUTOMATED COUNT: 14.4 % (ref 11.5–14.9)
GFR, ESTIMATED: 59 ML/MIN/1.73M2
GLUCOSE SERPL-MCNC: 301 MG/DL (ref 74–99)
HCT VFR BLD AUTO: 44.9 % (ref 36–46)
HGB BLD-MCNC: 15 G/DL (ref 12–16)
LIPASE SERPL-CCNC: 44 U/L (ref 13–60)
LYMPHOCYTES NFR BLD: 2 K/UL (ref 1–4.8)
LYMPHOCYTES RELATIVE PERCENT: 23 % (ref 24–44)
MCH RBC QN AUTO: 30.3 PG (ref 26–34)
MCHC RBC AUTO-ENTMCNC: 33.3 G/DL (ref 31–37)
MCV RBC AUTO: 90.9 FL (ref 80–100)
MONOCYTES NFR BLD: 0.8 K/UL (ref 0.1–1.3)
MONOCYTES NFR BLD: 9 % (ref 1–7)
NEUTROPHILS NFR BLD: 66 % (ref 36–66)
NEUTS SEG NFR BLD: 5.6 K/UL (ref 1.3–9.1)
PLATELET # BLD AUTO: 202 K/UL (ref 150–450)
PMV BLD AUTO: 9.5 FL (ref 6–12)
POTASSIUM SERPL-SCNC: 4 MMOL/L (ref 3.7–5.3)
PROT SERPL-MCNC: 7.3 G/DL (ref 6.6–8.7)
RBC # BLD AUTO: 4.95 M/UL (ref 4–5.2)
SODIUM SERPL-SCNC: 133 MMOL/L (ref 136–145)
WBC OTHER # BLD: 8.6 K/UL (ref 3.5–11)

## 2024-11-27 PROCEDURE — 36415 COLL VENOUS BLD VENIPUNCTURE: CPT

## 2024-11-27 PROCEDURE — 80053 COMPREHEN METABOLIC PANEL: CPT

## 2024-11-27 PROCEDURE — 99284 EMERGENCY DEPT VISIT MOD MDM: CPT

## 2024-11-27 PROCEDURE — 70450 CT HEAD/BRAIN W/O DYE: CPT

## 2024-11-27 PROCEDURE — 83690 ASSAY OF LIPASE: CPT

## 2024-11-27 PROCEDURE — 85025 COMPLETE CBC W/AUTO DIFF WBC: CPT

## 2024-11-27 PROCEDURE — 72125 CT NECK SPINE W/O DYE: CPT

## 2024-11-27 PROCEDURE — 71250 CT THORAX DX C-: CPT

## 2024-11-27 RX ORDER — 0.9 % SODIUM CHLORIDE 0.9 %
100 INTRAVENOUS SOLUTION INTRAVENOUS ONCE
Status: DISCONTINUED | OUTPATIENT
Start: 2024-11-27 | End: 2024-11-27

## 2024-11-27 RX ORDER — IOPAMIDOL 755 MG/ML
100 INJECTION, SOLUTION INTRAVASCULAR
Status: DISCONTINUED | OUTPATIENT
Start: 2024-11-27 | End: 2024-11-27

## 2024-11-27 RX ORDER — SODIUM CHLORIDE 0.9 % (FLUSH) 0.9 %
10 SYRINGE (ML) INJECTION PRN
Status: DISCONTINUED | OUTPATIENT
Start: 2024-11-27 | End: 2024-11-27

## 2024-11-27 ASSESSMENT — ENCOUNTER SYMPTOMS
VOMITING: 0
SHORTNESS OF BREATH: 0
ABDOMINAL PAIN: 1
WHEEZING: 0
BLOOD IN STOOL: 0
COUGH: 0
NAUSEA: 1

## 2024-11-27 NOTE — DISCHARGE INSTRUCTIONS
Be sure to follow-up with your primary care provider.  Return to emergency department for any acute concerns or worsening symptoms.

## 2024-11-27 NOTE — ED PROVIDER NOTES
[facility] ED  eMERGENCY dEPARTMENT eNCOUnter   Attending Attestation     Pt Name: Mariangel Abreu  MRN: [id number]  Birthdate 1950  Date of evaluation: 11/27/24       Mariangel Abreu is a 73 y.o. female who presents with Fall (Monday, hit head on bathroom wall/Fell onto knee, then onto scooter, then hit head on wall/Put ice on it, states does take thinners (not sure what one))      History:   Patient is here because she has had a couple falls over the last week and most recently 2 days ago she fell hit her head and also landed on her left side and her scooter and has been having some diffuse pain and nausea since then.  Patient states that she takes blood thinners.  Patient denies any headache.  Patient has a little bit of left-sided neck pain.  Patient denies any numbness tingling or weakness.    Exam: Vitals:   Vitals:    11/27/24 1512   BP: (!) 129/90   Pulse: 86   Resp: 18   Temp: 97.8 °F (36.6 °C)   TempSrc: Oral   SpO2: 94%   Weight: 102.5 kg (226 lb)   Height: 1.575 m (5' 2\")     Heart regular rate rhythm no murmurs.  Lungs clear to auscultation bilaterally.  Patient has diffuse tenderness to palpation with no crepitus or bruising to the chest wall.  Abdomen is soft diffusely tender to palpation but no peritoneal signs.    I performed a history and physical examination of the patient and discussed management with the resident. I reviewed the resident’s note and agree with the documented findings and plan of care. Any areas of disagreement are noted on the chart. I was personally present for the key portions of any procedures. I have documented in the chart those procedures where I was not present during the key portions. I have personally reviewed all images and agree with the resident's interpretation. I have reviewed the emergency nurses triage note. I agree with the chief complaint, past medical history, past surgical history, allergies, medications, social and family history as 
No medications on file       Shaq Sanchez MD  Emergency Medicine Resident    (Please note that portions of thisnote were completed with a voice recognition program.  Efforts were made to edit the dictations but occasionally words are mis-transcribed.)

## 2024-11-27 NOTE — ED TRIAGE NOTES
Mode of arrival (squad #, walk in, police, etc) : squad        Chief complaint(s): fall        Arrival Note (brief scenario, treatment PTA, etc).: Pt. States that Monday she was in bathroom and must have gotten water on floor after washing hands.  Slipped, tried grabbing onto scooter but was unable to.  Hit head on wall, has bump posterior/right.  Nurse aid suggested today that she come be evaluated.        C= \"Have you ever felt that you should Cut down on your drinking?\"  No  A= \"Have people Annoyed you by criticizing your drinking?\"  No  G= \"Have you ever felt bad or Guilty about your drinking?\"  No  E= \"Have you ever had a drink as an Eye-opener first thing in the morning to steady your nerves or to help a hangover?\"  No      Deferred []      Reason for deferring: N/A    *If yes to two or more: probable alcohol abuse.*

## 2024-11-29 ENCOUNTER — CARE COORDINATION (OUTPATIENT)
Dept: CARE COORDINATION | Age: 74
End: 2024-11-29

## 2024-11-29 NOTE — CARE COORDINATION
Care Transitions Note    Follow Up Call     Attempted to reach patient for transitions of care follow up.  Unable to reach patient.      Outreach Attempts:   HIPAA compliant voicemail left for patient.     Care Summary Note: Attempted to reach patient for subsequent call. Left Hipaa appropriate message with contact information requesting return call to [phone]     Follow Up Appointment:   Future Appointments         Provider Specialty Dept Phone    12/3/2024 10:00 AM Vivian Owusu RN Diabetes Services [phone]    12/11/2024 11:45 AM Carla Chua APRN  CNP Primary Care [phone]    12/18/2024 3:15 PM Rojas Mahan DO Pain Management [phone]    1/15/2025 3:00 PM Carla Chua APRN - CNP Primary Care [phone]    3/13/2025 10:20 AM Rhett Moss MD Neurology [phone]            Plan for follow-up call in 2-5 days based on severity of symptoms and risk factors. Plan for next call:  follw up on ed visit, final call    Zamzam Hollingsworth RN

## 2024-12-04 ENCOUNTER — CARE COORDINATION (OUTPATIENT)
Dept: CARE COORDINATION | Age: 74
End: 2024-12-04

## 2024-12-04 NOTE — CARE COORDINATION
Care Transitions Note    Follow Up Call     Attempted to reach patient for transitions of care follow up.  Unable to reach patient.      Outreach Attempts:   Multiple attempts to contact patient at phone numbers on file.   HIPAA compliant voicemail left for patient.     Care Summary Note: 2nd attempt-will end care transitions if no return call received.     Follow Up Appointment:   Future Appointments         Provider Specialty Dept Phone    12/16/2024 10:45 AM Carla Chua APRN - CNP Primary Care [phone]    12/18/2024 3:15 PM Rojas Mahan DO Pain Management [phone]    1/15/2025 3:00 PM Carla Chua APRN - CNP Primary Care [phone]    3/13/2025 10:20 AM Rhett Moss MD Neurology [phone]            No further follow-up call indicated based on severity of symptoms and risk factors.     Annie Porras LPN

## 2024-12-06 ENCOUNTER — TELEPHONE (OUTPATIENT)
Dept: PRIMARY CARE CLINIC | Age: 74
End: 2024-12-06

## 2024-12-06 DIAGNOSIS — G89.29 CHRONIC BILATERAL LOW BACK PAIN WITH BILATERAL SCIATICA: Primary | Chronic | ICD-10-CM

## 2024-12-06 DIAGNOSIS — M54.42 CHRONIC BILATERAL LOW BACK PAIN WITH BILATERAL SCIATICA: Primary | Chronic | ICD-10-CM

## 2024-12-06 DIAGNOSIS — M54.41 CHRONIC BILATERAL LOW BACK PAIN WITH BILATERAL SCIATICA: Primary | Chronic | ICD-10-CM

## 2024-12-06 RX ORDER — AMLODIPINE BESYLATE 2.5 MG/1
2.5 TABLET ORAL DAILY
Qty: 90 TABLET | Refills: 0 | Status: SHIPPED | OUTPATIENT
Start: 2024-12-06

## 2024-12-16 DIAGNOSIS — M54.41 CHRONIC BILATERAL LOW BACK PAIN WITH BILATERAL SCIATICA: Chronic | ICD-10-CM

## 2024-12-16 DIAGNOSIS — G89.29 CHRONIC BILATERAL LOW BACK PAIN WITH BILATERAL SCIATICA: Chronic | ICD-10-CM

## 2024-12-16 DIAGNOSIS — M54.42 CHRONIC BILATERAL LOW BACK PAIN WITH BILATERAL SCIATICA: Chronic | ICD-10-CM

## 2024-12-18 ENCOUNTER — HOSPITAL ENCOUNTER (OUTPATIENT)
Dept: PAIN MANAGEMENT | Age: 74
Discharge: HOME | End: 2024-12-18
Payer: MEDICARE

## 2024-12-18 VITALS — HEIGHT: 62 IN | BODY MASS INDEX: 41.59 KG/M2 | WEIGHT: 226 LBS

## 2024-12-18 DIAGNOSIS — M50.30 DDD (DEGENERATIVE DISC DISEASE), CERVICAL: Chronic | ICD-10-CM

## 2024-12-18 DIAGNOSIS — M46.1 SACROILIITIS, NOT ELSEWHERE CLASSIFIED (HCC): Chronic | ICD-10-CM

## 2024-12-18 DIAGNOSIS — Z79.891 CHRONIC USE OF OPIATE FOR THERAPEUTIC PURPOSE: Primary | ICD-10-CM

## 2024-12-18 DIAGNOSIS — M47.817 LUMBOSACRAL SPONDYLOSIS WITHOUT MYELOPATHY: Chronic | ICD-10-CM

## 2024-12-18 DIAGNOSIS — M54.16 LUMBAR RADICULOPATHY, CHRONIC: ICD-10-CM

## 2024-12-18 PROCEDURE — 99213 OFFICE O/P EST LOW 20 MIN: CPT

## 2024-12-18 PROCEDURE — 99214 OFFICE O/P EST MOD 30 MIN: CPT | Performed by: STUDENT IN AN ORGANIZED HEALTH CARE EDUCATION/TRAINING PROGRAM

## 2024-12-18 RX ORDER — OXYCODONE AND ACETAMINOPHEN 5; 325 MG/1; MG/1
1 TABLET ORAL EVERY 8 HOURS PRN
Qty: 90 TABLET | Refills: 0 | Status: SHIPPED | OUTPATIENT
Start: 2024-12-20 | End: 2025-01-19

## 2024-12-28 DIAGNOSIS — E11.42 TYPE 2 DIABETES MELLITUS WITH DIABETIC POLYNEUROPATHY, WITH LONG-TERM CURRENT USE OF INSULIN (HCC): ICD-10-CM

## 2024-12-28 DIAGNOSIS — Z79.4 TYPE 2 DIABETES MELLITUS WITH DIABETIC POLYNEUROPATHY, WITH LONG-TERM CURRENT USE OF INSULIN (HCC): ICD-10-CM

## 2024-12-30 RX ORDER — METFORMIN HYDROCHLORIDE 500 MG/1
500 TABLET, EXTENDED RELEASE ORAL
Qty: 90 TABLET | Refills: 1 | Status: SHIPPED | OUTPATIENT
Start: 2024-12-30 | End: 2025-12-30

## 2024-12-31 DIAGNOSIS — Z79.4 TYPE 2 DIABETES MELLITUS WITH DIABETIC POLYNEUROPATHY, WITH LONG-TERM CURRENT USE OF INSULIN (HCC): ICD-10-CM

## 2024-12-31 DIAGNOSIS — E11.42 TYPE 2 DIABETES MELLITUS WITH DIABETIC POLYNEUROPATHY, WITH LONG-TERM CURRENT USE OF INSULIN (HCC): ICD-10-CM

## 2025-01-02 RX ORDER — METFORMIN HYDROCHLORIDE 500 MG/1
500 TABLET, EXTENDED RELEASE ORAL
Qty: 90 TABLET | Refills: 1 | OUTPATIENT
Start: 2025-01-02 | End: 2026-01-02

## 2025-01-08 ENCOUNTER — TRANSCRIBE ORDERS (OUTPATIENT)
Dept: ADMINISTRATIVE | Age: 75
End: 2025-01-08

## 2025-01-08 DIAGNOSIS — Z78.0 MENOPAUSE: Primary | ICD-10-CM

## 2025-01-11 ENCOUNTER — HOSPITAL ENCOUNTER (INPATIENT)
Age: 75
LOS: 2 days | Discharge: HOME | DRG: 303 | End: 2025-01-13
Attending: EMERGENCY MEDICINE | Admitting: INTERNAL MEDICINE
Payer: MEDICARE

## 2025-01-11 ENCOUNTER — APPOINTMENT (OUTPATIENT)
Dept: GENERAL RADIOLOGY | Age: 75
DRG: 303 | End: 2025-01-11
Payer: MEDICARE

## 2025-01-11 ENCOUNTER — APPOINTMENT (OUTPATIENT)
Dept: CT IMAGING | Age: 75
DRG: 303 | End: 2025-01-11
Payer: MEDICARE

## 2025-01-11 DIAGNOSIS — I20.9 ANGINA PECTORIS (HCC): ICD-10-CM

## 2025-01-11 DIAGNOSIS — R10.84 GENERALIZED ABDOMINAL PAIN: ICD-10-CM

## 2025-01-11 DIAGNOSIS — R07.9 CHEST PAIN, UNSPECIFIED TYPE: Primary | ICD-10-CM

## 2025-01-11 PROBLEM — I20.89 ANGINA AT REST (HCC): Status: ACTIVE | Noted: 2025-01-11

## 2025-01-11 LAB
ALBUMIN SERPL-MCNC: 3.8 G/DL (ref 3.5–5.2)
ALP SERPL-CCNC: 94 U/L (ref 35–104)
ALT SERPL-CCNC: 20 U/L (ref 10–35)
ANION GAP SERPL CALCULATED.3IONS-SCNC: 11 MMOL/L (ref 9–16)
AST SERPL-CCNC: 23 U/L (ref 10–35)
BASOPHILS # BLD: 0.1 K/UL (ref 0–0.2)
BASOPHILS NFR BLD: 1 % (ref 0–2)
BILIRUB DIRECT SERPL-MCNC: 0.1 MG/DL (ref 0–0.3)
BILIRUB INDIRECT SERPL-MCNC: 0.2 MG/DL (ref 0–1)
BILIRUB SERPL-MCNC: 0.3 MG/DL (ref 0–1.2)
BUN SERPL-MCNC: 15 MG/DL (ref 8–23)
CALCIUM SERPL-MCNC: 9.1 MG/DL (ref 8.6–10.4)
CHLORIDE SERPL-SCNC: 93 MMOL/L (ref 98–107)
CO2 SERPL-SCNC: 30 MMOL/L (ref 20–31)
CREAT SERPL-MCNC: 1 MG/DL (ref 0.7–1.2)
D DIMER PPP FEU-MCNC: 0.44 UG/ML FEU (ref 0–0.59)
EOSINOPHIL # BLD: 0.1 K/UL (ref 0–0.4)
EOSINOPHILS RELATIVE PERCENT: 1 % (ref 0–4)
ERYTHROCYTE [DISTWIDTH] IN BLOOD BY AUTOMATED COUNT: 14.4 % (ref 11.5–14.9)
GFR, ESTIMATED: 59 ML/MIN/1.73M2
GLUCOSE BLD-MCNC: 307 MG/DL (ref 65–105)
GLUCOSE SERPL-MCNC: 396 MG/DL (ref 74–99)
HCT VFR BLD AUTO: 45.4 % (ref 36–46)
HGB BLD-MCNC: 15.5 G/DL (ref 12–16)
INR PPP: 1
LIPASE SERPL-CCNC: 65 U/L (ref 13–60)
LYMPHOCYTES NFR BLD: 1.9 K/UL (ref 1–4.8)
LYMPHOCYTES RELATIVE PERCENT: 25 % (ref 24–44)
MCH RBC QN AUTO: 30.8 PG (ref 26–34)
MCHC RBC AUTO-ENTMCNC: 34.1 G/DL (ref 31–37)
MCV RBC AUTO: 90.4 FL (ref 80–100)
MONOCYTES NFR BLD: 0.8 K/UL (ref 0.1–1.3)
MONOCYTES NFR BLD: 11 % (ref 1–7)
MYOGLOBIN SERPL-MCNC: 31 NG/ML (ref 25–58)
MYOGLOBIN SERPL-MCNC: 33 NG/ML (ref 25–58)
NEUTROPHILS NFR BLD: 62 % (ref 36–66)
NEUTS SEG NFR BLD: 4.6 K/UL (ref 1.3–9.1)
PARTIAL THROMBOPLASTIN TIME: 28.2 SEC (ref 24–36)
PLATELET # BLD AUTO: 166 K/UL (ref 150–450)
PMV BLD AUTO: 10.1 FL (ref 6–12)
POTASSIUM SERPL-SCNC: 4.5 MMOL/L (ref 3.7–5.3)
PROT SERPL-MCNC: 7.3 G/DL (ref 6.6–8.7)
PROTHROMBIN TIME: 14.5 SEC (ref 11.8–14.6)
RBC # BLD AUTO: 5.02 M/UL (ref 4–5.2)
SODIUM SERPL-SCNC: 134 MMOL/L (ref 136–145)
T4 FREE SERPL-MCNC: 1.2 NG/DL (ref 0.9–1.7)
TROPONIN I SERPL HS-MCNC: 13 NG/L (ref 0–14)
TROPONIN I SERPL HS-MCNC: 13 NG/L (ref 0–14)
TSH SERPL DL<=0.05 MIU/L-ACNC: 2.11 UIU/ML (ref 0.27–4.2)
WBC OTHER # BLD: 7.5 K/UL (ref 3.5–11)

## 2025-01-11 PROCEDURE — 2060000000 HC ICU INTERMEDIATE R&B

## 2025-01-11 PROCEDURE — 84484 ASSAY OF TROPONIN QUANT: CPT

## 2025-01-11 PROCEDURE — 71045 X-RAY EXAM CHEST 1 VIEW: CPT

## 2025-01-11 PROCEDURE — 36415 COLL VENOUS BLD VENIPUNCTURE: CPT

## 2025-01-11 PROCEDURE — 83874 ASSAY OF MYOGLOBIN: CPT

## 2025-01-11 PROCEDURE — 2700000000 HC OXYGEN THERAPY PER DAY

## 2025-01-11 PROCEDURE — 74176 CT ABD & PELVIS W/O CONTRAST: CPT

## 2025-01-11 PROCEDURE — 99223 1ST HOSP IP/OBS HIGH 75: CPT | Performed by: INTERNAL MEDICINE

## 2025-01-11 PROCEDURE — 93005 ELECTROCARDIOGRAM TRACING: CPT | Performed by: EMERGENCY MEDICINE

## 2025-01-11 PROCEDURE — 6360000002 HC RX W HCPCS

## 2025-01-11 PROCEDURE — 99285 EMERGENCY DEPT VISIT HI MDM: CPT

## 2025-01-11 PROCEDURE — 82947 ASSAY GLUCOSE BLOOD QUANT: CPT

## 2025-01-11 PROCEDURE — 94761 N-INVAS EAR/PLS OXIMETRY MLT: CPT

## 2025-01-11 PROCEDURE — 85025 COMPLETE CBC W/AUTO DIFF WBC: CPT

## 2025-01-11 PROCEDURE — 84443 ASSAY THYROID STIM HORMONE: CPT

## 2025-01-11 PROCEDURE — 6370000000 HC RX 637 (ALT 250 FOR IP): Performed by: INTERNAL MEDICINE

## 2025-01-11 PROCEDURE — 85730 THROMBOPLASTIN TIME PARTIAL: CPT

## 2025-01-11 PROCEDURE — 2500000003 HC RX 250 WO HCPCS: Performed by: INTERNAL MEDICINE

## 2025-01-11 PROCEDURE — 94640 AIRWAY INHALATION TREATMENT: CPT

## 2025-01-11 PROCEDURE — 6370000000 HC RX 637 (ALT 250 FOR IP)

## 2025-01-11 PROCEDURE — 80048 BASIC METABOLIC PNL TOTAL CA: CPT

## 2025-01-11 PROCEDURE — 84439 ASSAY OF FREE THYROXINE: CPT

## 2025-01-11 PROCEDURE — 83690 ASSAY OF LIPASE: CPT

## 2025-01-11 PROCEDURE — 85379 FIBRIN DEGRADATION QUANT: CPT

## 2025-01-11 PROCEDURE — 80076 HEPATIC FUNCTION PANEL: CPT

## 2025-01-11 PROCEDURE — 85610 PROTHROMBIN TIME: CPT

## 2025-01-11 RX ORDER — AMLODIPINE BESYLATE 2.5 MG/1
2.5 TABLET ORAL DAILY
Status: DISCONTINUED | OUTPATIENT
Start: 2025-01-12 | End: 2025-01-13 | Stop reason: HOSPADM

## 2025-01-11 RX ORDER — DOCUSATE SODIUM 100 MG/1
100 CAPSULE, LIQUID FILLED ORAL 2 TIMES DAILY PRN
Status: DISCONTINUED | OUTPATIENT
Start: 2025-01-11 | End: 2025-01-13 | Stop reason: HOSPADM

## 2025-01-11 RX ORDER — DIPHENHYDRAMINE HCL 25 MG
50 TABLET ORAL ONCE
Status: DISCONTINUED | OUTPATIENT
Start: 2025-01-11 | End: 2025-01-11

## 2025-01-11 RX ORDER — ONDANSETRON 2 MG/ML
4 INJECTION INTRAMUSCULAR; INTRAVENOUS EVERY 6 HOURS PRN
Status: DISCONTINUED | OUTPATIENT
Start: 2025-01-11 | End: 2025-01-13 | Stop reason: HOSPADM

## 2025-01-11 RX ORDER — MAGNESIUM SULFATE 1 G/100ML
1000 INJECTION INTRAVENOUS PRN
Status: DISCONTINUED | OUTPATIENT
Start: 2025-01-11 | End: 2025-01-13 | Stop reason: HOSPADM

## 2025-01-11 RX ORDER — ACETAMINOPHEN 325 MG/1
650 TABLET ORAL EVERY 6 HOURS PRN
Status: DISCONTINUED | OUTPATIENT
Start: 2025-01-11 | End: 2025-01-13 | Stop reason: HOSPADM

## 2025-01-11 RX ORDER — SODIUM CHLORIDE 0.9 % (FLUSH) 0.9 %
5-40 SYRINGE (ML) INJECTION EVERY 12 HOURS SCHEDULED
Status: DISCONTINUED | OUTPATIENT
Start: 2025-01-11 | End: 2025-01-13 | Stop reason: HOSPADM

## 2025-01-11 RX ORDER — ACETAMINOPHEN 650 MG/1
650 SUPPOSITORY RECTAL EVERY 6 HOURS PRN
Status: DISCONTINUED | OUTPATIENT
Start: 2025-01-11 | End: 2025-01-13 | Stop reason: HOSPADM

## 2025-01-11 RX ORDER — SODIUM CHLORIDE 0.9 % (FLUSH) 0.9 %
10 SYRINGE (ML) INJECTION PRN
Status: DISCONTINUED | OUTPATIENT
Start: 2025-01-11 | End: 2025-01-13 | Stop reason: HOSPADM

## 2025-01-11 RX ORDER — METOPROLOL TARTRATE 25 MG/1
25 TABLET, FILM COATED ORAL 2 TIMES DAILY
Status: DISCONTINUED | OUTPATIENT
Start: 2025-01-11 | End: 2025-01-13 | Stop reason: HOSPADM

## 2025-01-11 RX ORDER — KETOROLAC TROMETHAMINE 30 MG/ML
15 INJECTION, SOLUTION INTRAMUSCULAR; INTRAVENOUS
Status: COMPLETED | OUTPATIENT
Start: 2025-01-11 | End: 2025-01-11

## 2025-01-11 RX ORDER — PANTOPRAZOLE SODIUM 20 MG/1
20 TABLET, DELAYED RELEASE ORAL
Status: DISCONTINUED | OUTPATIENT
Start: 2025-01-12 | End: 2025-01-13 | Stop reason: HOSPADM

## 2025-01-11 RX ORDER — ENOXAPARIN SODIUM 100 MG/ML
40 INJECTION SUBCUTANEOUS DAILY
Status: DISCONTINUED | OUTPATIENT
Start: 2025-01-11 | End: 2025-01-13 | Stop reason: HOSPADM

## 2025-01-11 RX ORDER — DEXTROSE MONOHYDRATE 100 MG/ML
INJECTION, SOLUTION INTRAVENOUS CONTINUOUS PRN
Status: DISCONTINUED | OUTPATIENT
Start: 2025-01-11 | End: 2025-01-13 | Stop reason: HOSPADM

## 2025-01-11 RX ORDER — POTASSIUM CHLORIDE 1500 MG/1
40 TABLET, EXTENDED RELEASE ORAL PRN
Status: DISCONTINUED | OUTPATIENT
Start: 2025-01-11 | End: 2025-01-13 | Stop reason: HOSPADM

## 2025-01-11 RX ORDER — INSULIN GLARGINE 100 [IU]/ML
50 INJECTION, SOLUTION SUBCUTANEOUS EVERY MORNING
Status: DISCONTINUED | OUTPATIENT
Start: 2025-01-12 | End: 2025-01-12

## 2025-01-11 RX ORDER — POTASSIUM CHLORIDE 7.45 MG/ML
10 INJECTION INTRAVENOUS PRN
Status: DISCONTINUED | OUTPATIENT
Start: 2025-01-11 | End: 2025-01-13 | Stop reason: HOSPADM

## 2025-01-11 RX ORDER — IPRATROPIUM BROMIDE AND ALBUTEROL SULFATE 2.5; .5 MG/3ML; MG/3ML
1 SOLUTION RESPIRATORY (INHALATION)
Status: DISCONTINUED | OUTPATIENT
Start: 2025-01-11 | End: 2025-01-13 | Stop reason: HOSPADM

## 2025-01-11 RX ORDER — INSULIN LISPRO 100 [IU]/ML
0-4 INJECTION, SOLUTION INTRAVENOUS; SUBCUTANEOUS
Status: DISCONTINUED | OUTPATIENT
Start: 2025-01-11 | End: 2025-01-13 | Stop reason: HOSPADM

## 2025-01-11 RX ORDER — ASPIRIN 81 MG/1
81 TABLET ORAL DAILY
Status: DISCONTINUED | OUTPATIENT
Start: 2025-01-12 | End: 2025-01-13 | Stop reason: HOSPADM

## 2025-01-11 RX ORDER — RANOLAZINE 500 MG/1
500 TABLET, EXTENDED RELEASE ORAL 2 TIMES DAILY
Status: DISCONTINUED | OUTPATIENT
Start: 2025-01-11 | End: 2025-01-13 | Stop reason: HOSPADM

## 2025-01-11 RX ORDER — GABAPENTIN 100 MG/1
100 CAPSULE ORAL 2 TIMES DAILY
Status: DISCONTINUED | OUTPATIENT
Start: 2025-01-11 | End: 2025-01-13 | Stop reason: HOSPADM

## 2025-01-11 RX ORDER — ESCITALOPRAM OXALATE 10 MG/1
10 TABLET ORAL DAILY
Status: DISCONTINUED | OUTPATIENT
Start: 2025-01-12 | End: 2025-01-13 | Stop reason: HOSPADM

## 2025-01-11 RX ORDER — SODIUM CHLORIDE 9 MG/ML
INJECTION, SOLUTION INTRAVENOUS PRN
Status: DISCONTINUED | OUTPATIENT
Start: 2025-01-11 | End: 2025-01-13 | Stop reason: HOSPADM

## 2025-01-11 RX ORDER — ALOGLIPTIN 12.5 MG/1
12.5 TABLET, FILM COATED ORAL DAILY
Status: DISCONTINUED | OUTPATIENT
Start: 2025-01-12 | End: 2025-01-13 | Stop reason: HOSPADM

## 2025-01-11 RX ORDER — ROSUVASTATIN CALCIUM 40 MG/1
40 TABLET, COATED ORAL NIGHTLY
Status: DISCONTINUED | OUTPATIENT
Start: 2025-01-11 | End: 2025-01-13 | Stop reason: HOSPADM

## 2025-01-11 RX ORDER — BUDESONIDE AND FORMOTEROL FUMARATE DIHYDRATE 160; 4.5 UG/1; UG/1
2 AEROSOL RESPIRATORY (INHALATION) 2 TIMES DAILY PRN
Status: DISCONTINUED | OUTPATIENT
Start: 2025-01-11 | End: 2025-01-13 | Stop reason: HOSPADM

## 2025-01-11 RX ORDER — POLYETHYLENE GLYCOL 3350 17 G/17G
17 POWDER, FOR SOLUTION ORAL DAILY PRN
Status: DISCONTINUED | OUTPATIENT
Start: 2025-01-11 | End: 2025-01-13 | Stop reason: HOSPADM

## 2025-01-11 RX ORDER — OXYBUTYNIN CHLORIDE 10 MG/1
10 TABLET, EXTENDED RELEASE ORAL NIGHTLY
Status: DISCONTINUED | OUTPATIENT
Start: 2025-01-11 | End: 2025-01-13 | Stop reason: HOSPADM

## 2025-01-11 RX ORDER — INSULIN GLARGINE 100 [IU]/ML
45 INJECTION, SOLUTION SUBCUTANEOUS NIGHTLY
Status: DISCONTINUED | OUTPATIENT
Start: 2025-01-11 | End: 2025-01-12

## 2025-01-11 RX ORDER — ONDANSETRON 4 MG/1
4 TABLET, ORALLY DISINTEGRATING ORAL EVERY 8 HOURS PRN
Status: DISCONTINUED | OUTPATIENT
Start: 2025-01-11 | End: 2025-01-13 | Stop reason: HOSPADM

## 2025-01-11 RX ADMIN — Medication 3 MG: at 22:06

## 2025-01-11 RX ADMIN — IPRATROPIUM BROMIDE AND ALBUTEROL SULFATE 1 DOSE: 2.5; .5 SOLUTION RESPIRATORY (INHALATION) at 20:24

## 2025-01-11 RX ADMIN — RANOLAZINE 500 MG: 500 TABLET, FILM COATED, EXTENDED RELEASE ORAL at 21:11

## 2025-01-11 RX ADMIN — OXYBUTYNIN CHLORIDE 10 MG: 10 TABLET, EXTENDED RELEASE ORAL at 21:12

## 2025-01-11 RX ADMIN — GABAPENTIN 100 MG: 100 CAPSULE ORAL at 21:11

## 2025-01-11 RX ADMIN — ROSUVASTATIN 40 MG: 40 TABLET, FILM COATED ORAL at 21:11

## 2025-01-11 RX ADMIN — METOPROLOL TARTRATE 25 MG: 25 TABLET, FILM COATED ORAL at 21:11

## 2025-01-11 RX ADMIN — KETOROLAC TROMETHAMINE 15 MG: 30 INJECTION, SOLUTION INTRAMUSCULAR at 22:06

## 2025-01-11 RX ADMIN — SODIUM CHLORIDE, PRESERVATIVE FREE 10 ML: 5 INJECTION INTRAVENOUS at 21:14

## 2025-01-11 RX ADMIN — TICAGRELOR 90 MG: 90 TABLET ORAL at 21:11

## 2025-01-11 RX ADMIN — INSULIN LISPRO 3 UNITS: 100 INJECTION, SOLUTION INTRAVENOUS; SUBCUTANEOUS at 22:07

## 2025-01-11 RX ADMIN — INSULIN GLARGINE 45 UNITS: 100 INJECTION, SOLUTION SUBCUTANEOUS at 22:06

## 2025-01-11 ASSESSMENT — PAIN - FUNCTIONAL ASSESSMENT
PAIN_FUNCTIONAL_ASSESSMENT: PREVENTS OR INTERFERES SOME ACTIVE ACTIVITIES AND ADLS
PAIN_FUNCTIONAL_ASSESSMENT: 0-10

## 2025-01-11 ASSESSMENT — PAIN DESCRIPTION - ONSET: ONSET: ON-GOING

## 2025-01-11 ASSESSMENT — PAIN DESCRIPTION - DIRECTION: RADIATING_TOWARDS: DENIES

## 2025-01-11 ASSESSMENT — PAIN DESCRIPTION - LOCATION: LOCATION: ABDOMEN

## 2025-01-11 ASSESSMENT — PAIN DESCRIPTION - ORIENTATION: ORIENTATION: LOWER

## 2025-01-11 ASSESSMENT — PAIN SCALES - GENERAL
PAINLEVEL_OUTOF10: 5
PAINLEVEL_OUTOF10: 0
PAINLEVEL_OUTOF10: 8

## 2025-01-11 ASSESSMENT — PAIN DESCRIPTION - PAIN TYPE: TYPE: ACUTE PAIN

## 2025-01-11 ASSESSMENT — PAIN DESCRIPTION - FREQUENCY: FREQUENCY: INTERMITTENT

## 2025-01-11 ASSESSMENT — PAIN DESCRIPTION - DESCRIPTORS: DESCRIPTORS: ACHING

## 2025-01-11 ASSESSMENT — HEART SCORE: ECG: NON-SPECIFC REPOLARIZATION DISTURBANCE/LBTB/PM

## 2025-01-11 NOTE — ED PROVIDER NOTES
eMERGENCY dEPARTMENT eNCOUnter      Pt Name: Mariangel Abreu  MRN: [id number]  Birthdate 1950  Date of evaluation: 1/11/25      CHIEF COMPLAINT       Chief Complaint   Patient presents with    Chest Pain         HISTORY OF PRESENT ILLNESS    Mariangel Abreu is a 74 y.o. female who presents complaining of chest pain.  Patient states around 11 AM today she started having chest pain, left breast.  Nonradiating.  Patient states she is feeling short of breath.  Patient took her own nitro which did not help a whole lot.  EMS gave aspirin.  Patient does have a history of cardiac disease atrial fibrillation chronic kidney disease and COPD.  Patient states that she had what she thought was the flu last week.  Patient is really not having any cough currently no fevers.      REVIEW OF SYSTEMS       Review of Systems   Constitutional:  Negative for activity change, appetite change, chills, diaphoresis and fever.   HENT:  Negative for congestion, ear pain, facial swelling, nosebleeds, rhinorrhea, sinus pressure, sore throat and trouble swallowing.    Eyes:  Negative for pain, discharge and redness.   Respiratory:  Positive for shortness of breath. Negative for cough, chest tightness and wheezing.    Cardiovascular:  Positive for chest pain. Negative for palpitations and leg swelling.   Gastrointestinal:  Negative for abdominal pain, blood in stool, constipation, diarrhea, nausea and vomiting.   Genitourinary:  Negative for difficulty urinating, dysuria, flank pain, frequency, genital sores and hematuria.   Musculoskeletal:  Negative for arthralgias, back pain, gait problem, joint swelling, myalgias and neck pain.   Skin:  Negative for color change, pallor, rash and wound.   Neurological:  Negative for dizziness, tremors, seizures, syncope, speech difficulty, weakness, numbness and headaches.   Psychiatric/Behavioral:  Negative for confusion, decreased concentration, hallucinations, self-injury, sleep disturbance and

## 2025-01-11 NOTE — H&P
mmol/L    CO2 30 20 - 31 mmol/L    Anion Gap 11 9 - 16 mmol/L    Glucose 396 (H) 74 - 99 mg/dL    BUN 15 8 - 23 mg/dL    Creatinine 1.0 0.7 - 1.2 mg/dL    Est, Glom Filt Rate 59 (L) >60 mL/min/1.73m2    Calcium 9.1 8.6 - 10.4 mg/dL   CBC with Auto Differential    Collection Time: 01/11/25  4:15 PM   Result Value Ref Range    WBC 7.5 3.5 - 11.0 k/uL    RBC 5.02 4.0 - 5.2 m/uL    Hemoglobin 15.5 12.0 - 16.0 g/dL    Hematocrit 45.4 36 - 46 %    MCV 90.4 80 - 100 fL    MCH 30.8 26 - 34 pg    MCHC 34.1 31 - 37 g/dL    RDW 14.4 11.5 - 14.9 %    Platelets 166 150 - 450 k/uL    MPV 10.1 6.0 - 12.0 fL    Neutrophils % 62 36 - 66 %    Lymphocytes % 25 24 - 44 %    Monocytes % 11 (H) 1 - 7 %    Eosinophils % 1 0 - 4 %    Basophils % 1 0 - 2 %    Neutrophils Absolute 4.60 1.3 - 9.1 k/uL    Lymphocytes Absolute 1.90 1.0 - 4.8 k/uL    Monocytes Absolute 0.80 0.1 - 1.3 k/uL    Eosinophils Absolute 0.10 0.0 - 0.4 k/uL    Basophils Absolute 0.10 0.0 - 0.2 k/uL   D-Dimer, Quantitative    Collection Time: 01/11/25  4:15 PM   Result Value Ref Range    D-Dimer, Quant 0.44 0.00 - 0.59 ug/mL FEU   TROP/MYOGLOBIN    Collection Time: 01/11/25  4:15 PM   Result Value Ref Range    Troponin, High Sensitivity 13 0 - 14 ng/L    Myoglobin 33 25 - 58 ng/mL   TSH    Collection Time: 01/11/25  4:15 PM   Result Value Ref Range    TSH 2.11 0.27 - 4.20 uIU/mL   T4, Free    Collection Time: 01/11/25  4:15 PM   Result Value Ref Range    T4 Free 1.2 0.9 - 1.7 ng/dL   APTT    Collection Time: 01/11/25  4:15 PM   Result Value Ref Range    APTT 28.2 24.0 - 36.0 sec   Protime-INR    Collection Time: 01/11/25  4:15 PM   Result Value Ref Range    Protime 14.5 11.8 - 14.6 sec    INR 1.0    Lipase    Collection Time: 01/11/25  4:15 PM   Result Value Ref Range    Lipase 65 (H) 13 - 60 U/L   Hepatic Function Panel    Collection Time: 01/11/25  4:15 PM   Result Value Ref Range    Albumin 3.8 3.5 - 5.2 g/dL    Alkaline Phosphatase 94 35 - 104 U/L    ALT 20 10 - 35

## 2025-01-12 LAB
GLUCOSE BLD-MCNC: 229 MG/DL (ref 65–105)
GLUCOSE BLD-MCNC: 239 MG/DL (ref 65–105)
GLUCOSE BLD-MCNC: 249 MG/DL (ref 65–105)
GLUCOSE BLD-MCNC: 321 MG/DL (ref 65–105)
TROPONIN I SERPL HS-MCNC: 13 NG/L (ref 0–14)

## 2025-01-12 PROCEDURE — 2700000000 HC OXYGEN THERAPY PER DAY

## 2025-01-12 PROCEDURE — 82947 ASSAY GLUCOSE BLOOD QUANT: CPT

## 2025-01-12 PROCEDURE — 93005 ELECTROCARDIOGRAM TRACING: CPT | Performed by: INTERNAL MEDICINE

## 2025-01-12 PROCEDURE — 2060000000 HC ICU INTERMEDIATE R&B

## 2025-01-12 PROCEDURE — 6360000002 HC RX W HCPCS

## 2025-01-12 PROCEDURE — 6370000000 HC RX 637 (ALT 250 FOR IP)

## 2025-01-12 PROCEDURE — 6370000000 HC RX 637 (ALT 250 FOR IP): Performed by: INTERNAL MEDICINE

## 2025-01-12 PROCEDURE — 6360000002 HC RX W HCPCS: Performed by: INTERNAL MEDICINE

## 2025-01-12 PROCEDURE — 99223 1ST HOSP IP/OBS HIGH 75: CPT | Performed by: INTERNAL MEDICINE

## 2025-01-12 PROCEDURE — 99232 SBSQ HOSP IP/OBS MODERATE 35: CPT | Performed by: INTERNAL MEDICINE

## 2025-01-12 PROCEDURE — 2500000003 HC RX 250 WO HCPCS: Performed by: INTERNAL MEDICINE

## 2025-01-12 PROCEDURE — 94761 N-INVAS EAR/PLS OXIMETRY MLT: CPT

## 2025-01-12 PROCEDURE — 84484 ASSAY OF TROPONIN QUANT: CPT

## 2025-01-12 PROCEDURE — 94640 AIRWAY INHALATION TREATMENT: CPT

## 2025-01-12 PROCEDURE — 36415 COLL VENOUS BLD VENIPUNCTURE: CPT

## 2025-01-12 RX ORDER — INSULIN GLARGINE 100 [IU]/ML
50 INJECTION, SOLUTION SUBCUTANEOUS NIGHTLY
Status: DISCONTINUED | OUTPATIENT
Start: 2025-01-12 | End: 2025-01-13 | Stop reason: HOSPADM

## 2025-01-12 RX ORDER — NITROGLYCERIN 0.4 MG/1
0.4 TABLET SUBLINGUAL EVERY 5 MIN PRN
Status: DISCONTINUED | OUTPATIENT
Start: 2025-01-12 | End: 2025-01-13 | Stop reason: HOSPADM

## 2025-01-12 RX ORDER — INSULIN GLARGINE 100 [IU]/ML
50 INJECTION, SOLUTION SUBCUTANEOUS EVERY MORNING
Status: DISCONTINUED | OUTPATIENT
Start: 2025-01-13 | End: 2025-01-13 | Stop reason: HOSPADM

## 2025-01-12 RX ORDER — KETOROLAC TROMETHAMINE 30 MG/ML
15 INJECTION, SOLUTION INTRAMUSCULAR; INTRAVENOUS ONCE
Status: COMPLETED | OUTPATIENT
Start: 2025-01-12 | End: 2025-01-12

## 2025-01-12 RX ADMIN — ALOGLIPTIN 12.5 MG: 12.5 TABLET, FILM COATED ORAL at 07:55

## 2025-01-12 RX ADMIN — OXYBUTYNIN CHLORIDE 10 MG: 10 TABLET, EXTENDED RELEASE ORAL at 21:55

## 2025-01-12 RX ADMIN — INSULIN LISPRO 1 UNITS: 100 INJECTION, SOLUTION INTRAVENOUS; SUBCUTANEOUS at 21:57

## 2025-01-12 RX ADMIN — ASPIRIN 81 MG: 81 TABLET, COATED ORAL at 07:55

## 2025-01-12 RX ADMIN — INSULIN LISPRO 3 UNITS: 100 INJECTION, SOLUTION INTRAVENOUS; SUBCUTANEOUS at 11:51

## 2025-01-12 RX ADMIN — RANOLAZINE 500 MG: 500 TABLET, FILM COATED, EXTENDED RELEASE ORAL at 07:55

## 2025-01-12 RX ADMIN — IPRATROPIUM BROMIDE AND ALBUTEROL SULFATE 1 DOSE: 2.5; .5 SOLUTION RESPIRATORY (INHALATION) at 15:47

## 2025-01-12 RX ADMIN — TICAGRELOR 90 MG: 90 TABLET ORAL at 07:55

## 2025-01-12 RX ADMIN — INSULIN LISPRO 3 UNITS: 100 INJECTION, SOLUTION INTRAVENOUS; SUBCUTANEOUS at 17:32

## 2025-01-12 RX ADMIN — ENOXAPARIN SODIUM 40 MG: 100 INJECTION SUBCUTANEOUS at 07:56

## 2025-01-12 RX ADMIN — INSULIN GLARGINE 50 UNITS: 100 INJECTION, SOLUTION SUBCUTANEOUS at 07:55

## 2025-01-12 RX ADMIN — TICAGRELOR 90 MG: 90 TABLET ORAL at 21:55

## 2025-01-12 RX ADMIN — IPRATROPIUM BROMIDE AND ALBUTEROL SULFATE 1 DOSE: 2.5; .5 SOLUTION RESPIRATORY (INHALATION) at 08:18

## 2025-01-12 RX ADMIN — RANOLAZINE 500 MG: 500 TABLET, FILM COATED, EXTENDED RELEASE ORAL at 21:56

## 2025-01-12 RX ADMIN — METOPROLOL TARTRATE 25 MG: 25 TABLET, FILM COATED ORAL at 22:35

## 2025-01-12 RX ADMIN — DOCUSATE SODIUM 100 MG: 100 CAPSULE, LIQUID FILLED ORAL at 21:55

## 2025-01-12 RX ADMIN — ROSUVASTATIN 40 MG: 40 TABLET, FILM COATED ORAL at 21:55

## 2025-01-12 RX ADMIN — INSULIN GLARGINE 50 UNITS: 100 INJECTION, SOLUTION SUBCUTANEOUS at 21:56

## 2025-01-12 RX ADMIN — KETOROLAC TROMETHAMINE 15 MG: 30 INJECTION, SOLUTION INTRAMUSCULAR at 22:36

## 2025-01-12 RX ADMIN — GABAPENTIN 100 MG: 100 CAPSULE ORAL at 21:55

## 2025-01-12 RX ADMIN — NITROGLYCERIN 0.4 MG: 0.4 TABLET SUBLINGUAL at 14:16

## 2025-01-12 RX ADMIN — METOPROLOL TARTRATE 25 MG: 25 TABLET, FILM COATED ORAL at 07:55

## 2025-01-12 RX ADMIN — INSULIN LISPRO 1 UNITS: 100 INJECTION, SOLUTION INTRAVENOUS; SUBCUTANEOUS at 07:56

## 2025-01-12 RX ADMIN — NITROGLYCERIN 0.4 MG: 0.4 TABLET SUBLINGUAL at 14:37

## 2025-01-12 RX ADMIN — Medication 3 MG: at 22:34

## 2025-01-12 RX ADMIN — IPRATROPIUM BROMIDE AND ALBUTEROL SULFATE 1 DOSE: 2.5; .5 SOLUTION RESPIRATORY (INHALATION) at 11:22

## 2025-01-12 RX ADMIN — AMLODIPINE BESYLATE 2.5 MG: 2.5 TABLET ORAL at 07:55

## 2025-01-12 RX ADMIN — EMPAGLIFLOZIN 10 MG: 10 TABLET, FILM COATED ORAL at 07:55

## 2025-01-12 RX ADMIN — GABAPENTIN 100 MG: 100 CAPSULE ORAL at 07:55

## 2025-01-12 RX ADMIN — ONDANSETRON 4 MG: 2 INJECTION, SOLUTION INTRAMUSCULAR; INTRAVENOUS at 04:17

## 2025-01-12 RX ADMIN — ESCITALOPRAM OXALATE 10 MG: 10 TABLET ORAL at 07:55

## 2025-01-12 RX ADMIN — IPRATROPIUM BROMIDE AND ALBUTEROL SULFATE 1 DOSE: 2.5; .5 SOLUTION RESPIRATORY (INHALATION) at 19:49

## 2025-01-12 RX ADMIN — PANTOPRAZOLE SODIUM 20 MG: 20 TABLET, DELAYED RELEASE ORAL at 05:58

## 2025-01-12 RX ADMIN — SODIUM CHLORIDE, PRESERVATIVE FREE 10 ML: 5 INJECTION INTRAVENOUS at 07:58

## 2025-01-12 RX ADMIN — NITROGLYCERIN 0.4 MG: 0.4 TABLET SUBLINGUAL at 13:41

## 2025-01-12 RX ADMIN — ACETAMINOPHEN 650 MG: 325 TABLET ORAL at 21:56

## 2025-01-12 RX ADMIN — SODIUM CHLORIDE, PRESERVATIVE FREE 5 ML: 5 INJECTION INTRAVENOUS at 22:10

## 2025-01-12 ASSESSMENT — PAIN DESCRIPTION - ORIENTATION: ORIENTATION: MID

## 2025-01-12 ASSESSMENT — PAIN SCALES - GENERAL
PAINLEVEL_OUTOF10: 7
PAINLEVEL_OUTOF10: 7
PAINLEVEL_OUTOF10: 9
PAINLEVEL_OUTOF10: 9
PAINLEVEL_OUTOF10: 7
PAINLEVEL_OUTOF10: 9
PAINLEVEL_OUTOF10: 3

## 2025-01-12 ASSESSMENT — PAIN DESCRIPTION - LOCATION
LOCATION: CHEST
LOCATION: CHEST
LOCATION: ABDOMEN
LOCATION: CHEST

## 2025-01-12 ASSESSMENT — PAIN DESCRIPTION - FREQUENCY: FREQUENCY: INTERMITTENT

## 2025-01-12 ASSESSMENT — PAIN - FUNCTIONAL ASSESSMENT: PAIN_FUNCTIONAL_ASSESSMENT: ACTIVITIES ARE NOT PREVENTED

## 2025-01-12 ASSESSMENT — PAIN DESCRIPTION - DESCRIPTORS
DESCRIPTORS: PRESSURE

## 2025-01-12 ASSESSMENT — PAIN DESCRIPTION - PAIN TYPE: TYPE: ACUTE PAIN

## 2025-01-12 NOTE — ACP (ADVANCE CARE PLANNING)
Advance Care Planning     Advance Care Planning Activator (Inpatient)  Conversation Note      Date of ACP Conversation: 1/12/2025     Conversation Conducted with: Patient with Decision Making Capacity    ACP Activator: Karolyn Porter RN    Health Care Decision Maker:     Current Designated Health Care Decision Maker:     Primary Decision Maker (Active): David Waller - Other Relative - [phone]  Click here to complete Healthcare Decision Makers including section of the Healthcare Decision Maker Relationship (ie \"Primary\")  Today we documented Decision Maker(s) consistent with Legal Next of Kin hierarchy.    Care Preferences    Ventilation:  \"If you were in your present state of health and suddenly became very ill and were unable to breathe on your own, what would your preference be about the use of a ventilator (breathing machine) if it were available to you?\"      Would the patient desire the use of ventilator (breathing machine)?: yes    \"If your health worsens and it becomes clear that your chance of recovery is unlikely, what would your preference be about the use of a ventilator (breathing machine) if it were available to you?\"     Would the patient desire the use of ventilator (breathing machine)?: No      Resuscitation  \"CPR works best to restart the heart when there is a sudden event, like a heart attack, in someone who is otherwise healthy. Unfortunately, CPR does not typically restart the heart for people who have serious health conditions or who are very sick.\"    \"In the event your heart stopped as a result of an underlying serious health condition, would you want attempts to be made to restart your heart (answer \"yes\" for attempt to resuscitate) or would you prefer a natural death (answer \"no\" for do not attempt to resuscitate)?\" yes       [] Yes   [x] No   Educated Patient / Decision Maker regarding differences between Advance Directives and portable DNR orders.    Length of ACP Conversation in

## 2025-01-12 NOTE — CONSULTS
[facility]  In Patient Cardiology Consult      Date:   1/12/2025  Patient name: Mariangel Abreu  Date of admission:  1/11/2025  3:51 PM  MRN:   [id number]  YOB: 1950    Reason for Admission: Chest pain    CHIEF COMPLAINT: Chest pain    History Obtained From: The patient and chart    HISTORY OF PRESENT ILLNESS:    This is 74-year-old female.  She is known to our group.  She underwent PCI initially in February, then again in March.  She came into the emergency room.  She was complaining of chest pain.  She describes it as near her left breast.  Nonradiating.  Had associated shortness of breath.  Tried nitro sublingual without much benefit.  Denies any orthopnea, PND, palpitations, lower extremity edema, syncope.    Past Medical History:    Past Medical History:   Diagnosis Date    Abdominal bloating 01/26/2021    Adenomatous polyp of ascending colon 01/26/2021    Allergic rhinitis     Anxiety 07/17/2013    Arthritis     Asthma     Atrial fibrillation (HCC)     Back pain     NERVE/DR. GRACIA    Benign hypertension with CKD (chronic kidney disease) stage III ([facility])     CAD (coronary artery disease) 03/21/2013    Caffeine use     2 coffee/day    CHF (congestive heart failure) (HCC)     Chronic kidney disease     CKD (chronic kidney disease) stage 3, GFR 30-59 ml/min ([facility])     Claudication ([facility]) 6/2/2024    COPD (chronic obstructive pulmonary disease) ([facility])     emphysema    Degeneration of lumbar or lumbosacral intervertebral disc     Depression     DM (diabetes mellitus) ([facility])     Emphysema of lung (HCC)     Gastritis     GERD (gastroesophageal reflux disease)     Hearing loss     Hematuria     Hiatal hernia     History of loop recorder     HTN (hypertension), benign 06/28/2014    Hypertriglyceridemia     Incontinence of urine 09/25/2013    Irritable bowel syndrome with both constipation and diarrhea 01/26/2021    Kidney stone     Knee arthropathy     Long term (current) use of

## 2025-01-13 ENCOUNTER — APPOINTMENT (OUTPATIENT)
Dept: NUCLEAR MEDICINE | Age: 75
DRG: 303 | End: 2025-01-13
Payer: MEDICARE

## 2025-01-13 ENCOUNTER — HOSPITAL ENCOUNTER (INPATIENT)
Age: 75
Discharge: HOME | DRG: 303 | End: 2025-01-15
Payer: MEDICARE

## 2025-01-13 VITALS
SYSTOLIC BLOOD PRESSURE: 106 MMHG | HEIGHT: 62 IN | OXYGEN SATURATION: 92 % | DIASTOLIC BLOOD PRESSURE: 74 MMHG | HEART RATE: 103 BPM | TEMPERATURE: 97.2 F | RESPIRATION RATE: 16 BRPM | BODY MASS INDEX: 38.83 KG/M2 | WEIGHT: 211 LBS

## 2025-01-13 LAB
ECHO BSA: 2.05 M2
GLUCOSE BLD-MCNC: 101 MG/DL (ref 65–105)
GLUCOSE BLD-MCNC: 150 MG/DL (ref 65–105)
GLUCOSE BLD-MCNC: 88 MG/DL (ref 65–105)
GLUCOSE BLD-MCNC: 95 MG/DL (ref 65–105)
STRESS BASELINE DIAS BP: 49 MMHG
STRESS BASELINE HR: 58 BPM
STRESS BASELINE SYS BP: 155 MMHG
STRESS ESTIMATED WORKLOAD: 1 METS
STRESS PEAK DIAS BP: 70 MMHG
STRESS PEAK SYS BP: 110 MMHG
STRESS PERCENT HR ACHIEVED: 51 %
STRESS POST PEAK HR: 74 BPM
STRESS RATE PRESSURE PRODUCT: 8140 BPM*MMHG
STRESS STAGE RECOVERY 1 BP: NORMAL MMHG
STRESS STAGE RECOVERY 1 DURATION: 1 MIN:SEC
STRESS STAGE RECOVERY 1 HR: 74 BPM
STRESS STAGE RECOVERY 2 BP: NORMAL MMHG
STRESS STAGE RECOVERY 2 DURATION: 7 MIN:SEC
STRESS STAGE RECOVERY 2 HR: 70 BPM
STRESS TARGET HR: 145 BPM

## 2025-01-13 PROCEDURE — 2500000003 HC RX 250 WO HCPCS: Performed by: INTERNAL MEDICINE

## 2025-01-13 PROCEDURE — 2700000000 HC OXYGEN THERAPY PER DAY

## 2025-01-13 PROCEDURE — A9500 TC99M SESTAMIBI: HCPCS | Performed by: INTERNAL MEDICINE

## 2025-01-13 PROCEDURE — 97162 PT EVAL MOD COMPLEX 30 MIN: CPT

## 2025-01-13 PROCEDURE — 6370000000 HC RX 637 (ALT 250 FOR IP)

## 2025-01-13 PROCEDURE — 78452 HT MUSCLE IMAGE SPECT MULT: CPT

## 2025-01-13 PROCEDURE — 97535 SELF CARE MNGMENT TRAINING: CPT

## 2025-01-13 PROCEDURE — 82947 ASSAY GLUCOSE BLOOD QUANT: CPT

## 2025-01-13 PROCEDURE — 99239 HOSP IP/OBS DSCHRG MGMT >30: CPT | Performed by: INTERNAL MEDICINE

## 2025-01-13 PROCEDURE — 6360000002 HC RX W HCPCS: Performed by: INTERNAL MEDICINE

## 2025-01-13 PROCEDURE — 97116 GAIT TRAINING THERAPY: CPT

## 2025-01-13 PROCEDURE — 94640 AIRWAY INHALATION TREATMENT: CPT

## 2025-01-13 PROCEDURE — 94761 N-INVAS EAR/PLS OXIMETRY MLT: CPT

## 2025-01-13 PROCEDURE — 97530 THERAPEUTIC ACTIVITIES: CPT

## 2025-01-13 PROCEDURE — 93017 CV STRESS TEST TRACING ONLY: CPT

## 2025-01-13 PROCEDURE — 3430000000 HC RX DIAGNOSTIC RADIOPHARMACEUTICAL: Performed by: INTERNAL MEDICINE

## 2025-01-13 PROCEDURE — 6370000000 HC RX 637 (ALT 250 FOR IP): Performed by: INTERNAL MEDICINE

## 2025-01-13 PROCEDURE — 97166 OT EVAL MOD COMPLEX 45 MIN: CPT

## 2025-01-13 RX ORDER — NITROGLYCERIN 0.4 MG/1
0.4 TABLET SUBLINGUAL EVERY 5 MIN PRN
Status: ACTIVE | OUTPATIENT
Start: 2025-01-13 | End: 2025-01-13

## 2025-01-13 RX ORDER — MIDODRINE HYDROCHLORIDE 10 MG/1
10 TABLET ORAL ONCE
Status: COMPLETED | OUTPATIENT
Start: 2025-01-13 | End: 2025-01-13

## 2025-01-13 RX ORDER — AMINOPHYLLINE 25 MG/ML
50 INJECTION, SOLUTION INTRAVENOUS PRN
Status: ACTIVE | OUTPATIENT
Start: 2025-01-13 | End: 2025-01-13

## 2025-01-13 RX ORDER — SODIUM CHLORIDE 9 MG/ML
500 INJECTION, SOLUTION INTRAVENOUS CONTINUOUS PRN
Status: ACTIVE | OUTPATIENT
Start: 2025-01-13 | End: 2025-01-13

## 2025-01-13 RX ORDER — ATROPINE SULFATE 0.1 MG/ML
0.5 INJECTION INTRAVENOUS EVERY 5 MIN PRN
Status: ACTIVE | OUTPATIENT
Start: 2025-01-13 | End: 2025-01-13

## 2025-01-13 RX ORDER — TETRAKIS(2-METHOXYISOBUTYLISOCYANIDE)COPPER(I) TETRAFLUOROBORATE 1 MG/ML
35 INJECTION, POWDER, LYOPHILIZED, FOR SOLUTION INTRAVENOUS
Status: COMPLETED | OUTPATIENT
Start: 2025-01-13 | End: 2025-01-13

## 2025-01-13 RX ORDER — ALBUTEROL SULFATE 90 UG/1
2 INHALANT RESPIRATORY (INHALATION) PRN
Status: ACTIVE | OUTPATIENT
Start: 2025-01-13 | End: 2025-01-13

## 2025-01-13 RX ORDER — TETRAKIS(2-METHOXYISOBUTYLISOCYANIDE)COPPER(I) TETRAFLUOROBORATE 1 MG/ML
15 INJECTION, POWDER, LYOPHILIZED, FOR SOLUTION INTRAVENOUS
Status: COMPLETED | OUTPATIENT
Start: 2025-01-13 | End: 2025-01-13

## 2025-01-13 RX ORDER — CEPHALEXIN 500 MG/1
500 CAPSULE ORAL 4 TIMES DAILY
Qty: 28 CAPSULE | Refills: 0 | Status: SHIPPED | OUTPATIENT
Start: 2025-01-13 | End: 2025-01-20

## 2025-01-13 RX ORDER — METOPROLOL TARTRATE 1 MG/ML
5 INJECTION, SOLUTION INTRAVENOUS EVERY 5 MIN PRN
Status: ACTIVE | OUTPATIENT
Start: 2025-01-13 | End: 2025-01-13

## 2025-01-13 RX ORDER — REGADENOSON 0.08 MG/ML
0.4 INJECTION, SOLUTION INTRAVENOUS
Status: COMPLETED | OUTPATIENT
Start: 2025-01-13 | End: 2025-01-13

## 2025-01-13 RX ORDER — SODIUM CHLORIDE 0.9 % (FLUSH) 0.9 %
5-40 SYRINGE (ML) INJECTION PRN
Status: ACTIVE | OUTPATIENT
Start: 2025-01-13 | End: 2025-01-13

## 2025-01-13 RX ORDER — SODIUM CHLORIDE 0.9 % (FLUSH) 0.9 %
10 SYRINGE (ML) INJECTION PRN
Status: DISCONTINUED | OUTPATIENT
Start: 2025-01-13 | End: 2025-01-13 | Stop reason: HOSPADM

## 2025-01-13 RX ADMIN — GABAPENTIN 100 MG: 100 CAPSULE ORAL at 07:38

## 2025-01-13 RX ADMIN — IPRATROPIUM BROMIDE AND ALBUTEROL SULFATE 1 DOSE: 2.5; .5 SOLUTION RESPIRATORY (INHALATION) at 11:24

## 2025-01-13 RX ADMIN — MIDODRINE HYDROCHLORIDE 10 MG: 10 TABLET ORAL at 00:48

## 2025-01-13 RX ADMIN — SODIUM CHLORIDE, PRESERVATIVE FREE 10 ML: 5 INJECTION INTRAVENOUS at 07:40

## 2025-01-13 RX ADMIN — TICAGRELOR 90 MG: 90 TABLET ORAL at 07:39

## 2025-01-13 RX ADMIN — ENOXAPARIN SODIUM 40 MG: 100 INJECTION SUBCUTANEOUS at 07:38

## 2025-01-13 RX ADMIN — IPRATROPIUM BROMIDE AND ALBUTEROL SULFATE 1 DOSE: 2.5; .5 SOLUTION RESPIRATORY (INHALATION) at 15:27

## 2025-01-13 RX ADMIN — ESCITALOPRAM OXALATE 10 MG: 10 TABLET ORAL at 07:39

## 2025-01-13 RX ADMIN — AMLODIPINE BESYLATE 2.5 MG: 2.5 TABLET ORAL at 12:30

## 2025-01-13 RX ADMIN — Medication 18 MILLICURIE: at 08:42

## 2025-01-13 RX ADMIN — SODIUM CHLORIDE, PRESERVATIVE FREE 10 ML: 5 INJECTION INTRAVENOUS at 12:45

## 2025-01-13 RX ADMIN — ASPIRIN 81 MG: 81 TABLET, COATED ORAL at 07:39

## 2025-01-13 RX ADMIN — METOPROLOL TARTRATE 25 MG: 25 TABLET, FILM COATED ORAL at 12:30

## 2025-01-13 RX ADMIN — ALOGLIPTIN 12.5 MG: 12.5 TABLET, FILM COATED ORAL at 07:39

## 2025-01-13 RX ADMIN — PANTOPRAZOLE SODIUM 20 MG: 20 TABLET, DELAYED RELEASE ORAL at 06:32

## 2025-01-13 RX ADMIN — REGADENOSON 0.4 MG: 0.08 INJECTION, SOLUTION INTRAVENOUS at 08:39

## 2025-01-13 RX ADMIN — Medication 36.8 MILLICURIE: at 12:45

## 2025-01-13 RX ADMIN — EMPAGLIFLOZIN 10 MG: 10 TABLET, FILM COATED ORAL at 07:38

## 2025-01-13 ASSESSMENT — PAIN DESCRIPTION - FREQUENCY: FREQUENCY: INTERMITTENT

## 2025-01-13 ASSESSMENT — PAIN - FUNCTIONAL ASSESSMENT: PAIN_FUNCTIONAL_ASSESSMENT: ACTIVITIES ARE NOT PREVENTED

## 2025-01-13 ASSESSMENT — PAIN DESCRIPTION - LOCATION: LOCATION: ABDOMEN

## 2025-01-13 ASSESSMENT — PAIN DESCRIPTION - DESCRIPTORS: DESCRIPTORS: PRESSURE

## 2025-01-13 ASSESSMENT — PAIN DESCRIPTION - ONSET: ONSET: ON-GOING

## 2025-01-13 ASSESSMENT — PAIN DESCRIPTION - ORIENTATION: ORIENTATION: RIGHT

## 2025-01-13 ASSESSMENT — PAIN DESCRIPTION - PAIN TYPE: TYPE: ACUTE PAIN

## 2025-01-13 ASSESSMENT — PAIN SCALES - GENERAL: PAINLEVEL_OUTOF10: 1

## 2025-01-13 NOTE — PLAN OF CARE
Off floor on rounds  for testing , If stress test is low risk then  outpatient follow-up in 2 to 4 weeks

## 2025-01-13 NOTE — DISCHARGE INSTR - COC
Continuity of Care Form    Patient Name: Mariangel Abreu   :  1950  MRN:  [id number]    Admit date:  2025  Discharge date:  2025    Code Status Order: Full Code   Advance Directives:   Advance Care Flowsheet Documentation             Admitting Physician:  Mejia Kelly MD  PCP: Nuzhat Ramos DTR    Discharging Nurse: norma [facility] Unit/Room[facility] 2122/2122-01  Discharging Unit Phone Number: [phone]    Emergency Contact:   Extended Emergency Contact Information  Primary Emergency Contact: ENRICO KIRKLAND  Address: [facility]  Home Phone: [phone]  Relation: Grandchild  Secondary Emergency Contact: Yvonne Kirkland  Home Phone: [phone]  Relation: Grandchild    Past Surgical History:  Past Surgical History:   Procedure Laterality Date    ABLATION OF DYSRHYTHMIC FOCUS      CARDIAC PROCEDURE N/A 2024    klarissa / Left heart cath / coronary angiography / stemi er 22 performed by Victoriano Dee MD at [facility] CARDIAC CATH LAB    CARDIAC PROCEDURE N/A 3/11/2024    klarissa / Percutaneous coronary intervention performed by Victoriano Dee MD at [facility] CARDIAC CATH LAB    CARPAL TUNNEL RELEASE Right     CATARACT REMOVAL WITH IMPLANT Bilateral     CHOLECYSTECTOMY      COLONOSCOPY      COLONOSCOPY  04/10/2017    tubular adenomo polyp , mod. sigmoid diverticulosis, min. int. hemorrhoids    COLONOSCOPY N/A 3/2/2021    COLONOSCOPY WITH BIOPSY performed by Moris Brenner MD at [facility] ENDO    CYSTOSCOPY  2013    DILATION AND CURETTAGE  1979    EYE SURGERY      laser    INCONTINENCE SURGERY      Percutaneous nerve stimulation Dr Augie Amaya 2013     INSERTABLE CARDIAC MONITOR  2015    [employer] REVEAL LINQ MODEL #[id number] MRI CONDTIONAL OK 3T. IMMEDIATELY POST IMPLANT    OTHER SURGICAL HISTORY  09/10/15    removal of interstim    MO COLSC FLX W/REMOVAL LESION BY HOT BX FORCEPS N/A 4/10/2017    COLONOSCOPY POLYPECTOMY HOT BIOPSY performed by Ksenia Marroquin MD at

## 2025-01-13 NOTE — PLAN OF CARE
Problem: Chronic Conditions and Co-morbidities  Goal: Patient's chronic conditions and co-morbidity symptoms are monitored and maintained or improved  1/13/2025 0614 by Kushal Garcia RN  Outcome: Progressing     Problem: Discharge Planning  Goal: Discharge to home or other facility with appropriate resources  1/13/2025 0614 by Kushal Garcia RN  Outcome: Progressing     Problem: Pain  Goal: Verbalizes/displays adequate comfort level or baseline comfort level  1/13/2025 0614 by Kushal Garcia RN  Outcome: Progressing     Problem: Safety - Adult  Goal: Free from fall injury  1/13/2025 0614 by Kushal Garcia RN  Outcome: Progressing     Problem: ABCDS Injury Assessment  Goal: Absence of physical injury  Outcome: Progressing

## 2025-01-13 NOTE — DISCHARGE SUMMARY
gallbladder is surgically absent.  The biliary tree is within normal limits status post cholecystectomy.  The pancreas, spleen, and bilateral adrenal glands are unremarkable.  2 mm nonobstructing calculus in the right kidney.  No obstructive uropathy.  Simple appearing cysts in the left kidney measuring up to 4.6 cm.  No follow-up recommended. GI/Bowel: Colonic diverticulosis without evidence of acute diverticulitis. Normal appendix.  The stomach and small bowel are normal in appearance.  No obstruction or wall thickening identified. Pelvis: The urinary bladder is normal in appearance.  The uterus and bilateral adnexae are unremarkable.  No free fluid in the pelvis.  No pelvic or inguinal lymphadenopathy. Peritoneum/Retroperitoneum: The abdominal aorta is normal in caliber with moderate calcific plaquing.  No retroperitoneal or mesenteric lymphadenopathy is identified.  No free air or fluid is seen in the abdomen. Bones/Soft Tissues: No acute osseous or soft tissue abnormality.     1. No acute abnormality in the abdomen or pelvis. 2. Colonic diverticulosis. 3. 2 mm nonobstructing right renal calculus.     XR CHEST PORTABLE    Result Date: 1/11/2025  EXAMINATION: ONE XRAY VIEW OF THE CHEST 1/11/2025 12:59 pm COMPARISON: None. HISTORY: ORDERING SYSTEM PROVIDED HISTORY: Chest Pain TECHNOLOGIST PROVIDED HISTORY: Chest Pain Reason for Exam: Chest pain, 4 stents placed since last February. Pt states usually taking nitro helps the pain, but today is was not. FINDINGS: Heart / Mediastinum: Heart size is normal.  Normal pulmonary vasculature. Mediastinal contours are unremarkable. Lungs: No focal consolidation. Pleura: No pneumothorax or pleural effusion. Bones: No acute osseous abnormality.     No radiographic evidence of acute cardiopulmonary abnormality.         Consultations:    Consults:     Final Specialist Recommendations/Findings:   IP CONSULT TO VASCULAR ACCESS TEAM  IP CONSULT TO CARDIOLOGY      The patient was

## 2025-01-14 ENCOUNTER — CARE COORDINATION (OUTPATIENT)
Dept: CARE COORDINATION | Age: 75
End: 2025-01-14

## 2025-01-14 LAB
EKG ATRIAL RATE: 68 BPM
EKG ATRIAL RATE: 89 BPM
EKG ATRIAL RATE: 93 BPM
EKG P AXIS: 53 DEGREES
EKG P AXIS: 70 DEGREES
EKG P AXIS: 79 DEGREES
EKG P-R INTERVAL: 138 MS
EKG P-R INTERVAL: 140 MS
EKG P-R INTERVAL: 152 MS
EKG Q-T INTERVAL: 326 MS
EKG Q-T INTERVAL: 340 MS
EKG Q-T INTERVAL: 366 MS
EKG QRS DURATION: 62 MS
EKG QRS DURATION: 68 MS
EKG QRS DURATION: 72 MS
EKG QTC CALCULATION (BAZETT): 389 MS
EKG QTC CALCULATION (BAZETT): 405 MS
EKG QTC CALCULATION (BAZETT): 413 MS
EKG R AXIS: -53 DEGREES
EKG R AXIS: -63 DEGREES
EKG R AXIS: -66 DEGREES
EKG T AXIS: 100 DEGREES
EKG T AXIS: 85 DEGREES
EKG T AXIS: 91 DEGREES
EKG VENTRICULAR RATE: 68 BPM
EKG VENTRICULAR RATE: 89 BPM
EKG VENTRICULAR RATE: 93 BPM

## 2025-01-14 PROCEDURE — 93010 ELECTROCARDIOGRAM REPORT: CPT | Performed by: INTERNAL MEDICINE

## 2025-01-14 NOTE — PROGRESS NOTES
[facility] Internal Medicine  Richie Francisco MD; Adi Singh MD; Mejia Kelly MD; MD Cassandra Arnold MD; Rose Buckley MD    [facility] Internal Medicine   IN-PATIENT SERVICE   [facility]    HISTORY AND PHYSICAL EXAMINATION            Date:   1/12/2025  Patient name:  Mariangel Abreu  Date of admission:  1/11/2025  3:51 PM  MRN:   [id number]  Account:  [id number]  YOB: 1950  PCP:    Carla Chua APRN - CNP  Room:   2122/2122-01  Code Status:    Full Code    Chief Complaint:     Chief Complaint   Patient presents with    Chest Pain   Abdo pain left lower quadrant    History Obtained From:     Pt medical record and nursing staff and ED docotr    History of Present Illness:     Mariangel Abreu is a 74 y.o. Non- / non  female who presents with Chest Pain   and is admitted to the hospital for the management of Angina at rest (HCC).    74-year-old lady class II obese BMI is 38.6 with history of coronary disease multiple stent placed last 1 in March 2024 history of hypertension hyperlipidemia history of atrial fibrillation  History of COPD on home oxygen history of diabetes mellitus    Admitted with a 3-day history of left lower quadrant pain no aggravating relieving factors 6 out of 10 she also has a 2-week history of right front rib pain after she had a fall earlier this month  Ongoing left lower pain now she started feeling pressure and stabbing pain and some tingling in the left side of the chest she feels similar to what she had in last year before her stent  No aggravating relieving factor no associated nausea vomiting      Past Medical History:     Past Medical History:   Diagnosis Date    Abdominal bloating 01/26/2021    Adenomatous polyp of ascending colon 01/26/2021    Allergic rhinitis     Anxiety 07/17/2013    Arthritis     Asthma     Atrial fibrillation (HCC)     Back pain     NERVE/DR. GRACIA    Benign 
     [facility] Internal Medicine  Richie Francisco MD; Adi Singh MD; Mejia Kelly MD; MD Cassandra Arnold MD; Rose Buckley MD    [facility] Internal Medicine   IN-PATIENT SERVICE   [facility]    HISTORY AND PHYSICAL EXAMINATION            Date:   1/13/2025  Patient name:  Mariangel Abreu  Date of admission:  1/11/2025  3:51 PM  MRN:   [id number]  Account:  [id number]  YOB: 1950  PCP:    Nuzhat Ramos DTR  Room:   [facility]  Code Status:    Full Code    Chief Complaint:     Chief Complaint   Patient presents with    Chest Pain   Abdo pain left lower quadrant    History Obtained From:     Pt medical record and nursing staff and ED docotr    History of Present Illness:     Mariangel Abreu is a 74 y.o. Non- / non  female who presents with Chest Pain   and is admitted to the hospital for the management of Angina at rest (HCC).    74-year-old lady class II obese BMI is 38.6 with history of coronary disease multiple stent placed last 1 in March 2024 history of hypertension hyperlipidemia history of atrial fibrillation  History of COPD on home oxygen history of diabetes mellitus    Admitted with a 3-day history of left lower quadrant pain no aggravating relieving factors 6 out of 10 she also has a 2-week history of right front rib pain after she had a fall earlier this month  Ongoing left lower pain now she started feeling pressure and stabbing pain and some tingling in the left side of the chest she feels similar to what she had in last year before her stent  No aggravating relieving factor no associated nausea vomiting      Past Medical History:     Past Medical History:   Diagnosis Date    Abdominal bloating 01/26/2021    Adenomatous polyp of ascending colon 01/26/2021    Allergic rhinitis     Anxiety 07/17/2013    Arthritis     Asthma     Atrial fibrillation (HCC)     Back pain     NERVE/DR. GRACIA    Benign 
BP up to 101/59. 3rd dose of nitro given SL. Pt remains A+Ox4. Pt states pain is now \"dull pressure middle of chest, before is was heavy.\" Pt continues to rest in bed.  Trop 13.  Primary nurse, Florence updated.  
BRONCHOSPASM/BRONCHOCONSTRICTION     [x]         IMPROVE AERATION/BREATH SOUNDS  [x]   ADMINISTER BRONCHODILATOR THERAPY AS APPROPRIATE  [x]   ASSESS BREATH SOUNDS  []   IMPLEMENT AEROSOL/MDI PROTOCOL  [x]   PATIENT EDUCATION AS NEEDED    
BRONCHOSPASM/BRONCHOCONSTRICTION     [x]         IMPROVE AERATION/BREATH SOUNDS  [x]   ADMINISTER BRONCHODILATOR THERAPY AS APPROPRIATE  [x]   ASSESS BREATH SOUNDS  []   IMPLEMENT AEROSOL/MDI PROTOCOL  [x]   PATIENT EDUCATION AS NEEDED    
NP Nando Motta, ok with discharge since stress test is negative  
Patient states her daughter is on her way to pick patient up.  Instructed patient to call nurse when daughter arrives to be wheeled downstairs. Wheelchair placed in room.    
Physical Therapy        Physical Therapy Cancel Note      DATE: 2025    NAME: Mariangel Abreu  MRN: [id number]   : 1950      Patient not seen this date for Physical Therapy due to:    2025 at 934:  Pt out of room at stress test per nurse Welsh.  Will attempt to see later today if time permits.       Electronically signed by Heide Damon PT on 2025 at 10:08 AM      
Physical Therapy        Physical Therapy Cancel Note      DATE: 2025    NAME: Mariangel Abreu  MRN: [id number]   : 1950      Patient not seen this date for Physical Therapy due to:    PT jessie held per RN due to pt having chest pain      Electronically signed by Kellee Rivers PT on 2025 at 2:25 PM     
Provider notified of patient hypotension.  Plan to hold Ranexa.  Plan for midodrine.  Patient blood pressure responded.  
Pt BP was low NP notified    \"Pt having low BP, has been soft all day. 0015 vitals were 75/45, manual re-check 96/70. She is aysymptomatic at this time. Pt was 120/60 when giving evening meds, gave lopressor and renexa Do we want to do midodrine or fluid bolus, do we want parameters on the lopressor? \"    NP replied \"No, we will hold Ranexa keep the beta blocker and IV fluid bolus \"     NP also put an order in for Midodrine (not mentioned in perfect serve)   
Pt c/o of chest pain on scale 0-9 stating a 9 describe as pressure. Pt recent vitals 99/62 HR 68 T 97.5 RR 16 SpO2 95% on 2L NC. Rn notified Dr. Pereira new orders give nitro SL x3 prn, trop and EKG.Call back MD if pain is not relived.   
Returned to patient room to check on patient.  Patient and belongings not in room.  Wheelchair still in room.  
Verbalized understanding     Functional Outcome Measures  AM-PAC Basic Mobility - Inpatient   How much help is needed turning from your back to your side while in a flat bed without using bedrails?: A Little  How much help is needed moving from lying on your back to sitting on the side of a flat bed without using bedrails?: A Little  How much help is needed moving to and from a bed to a chair?: A Little  How much help is needed standing up from a chair using your arms?: A Little  How much help is needed walking in hospital room?: A Little  How much help is needed climbing 3-5 steps with a railing?: Total  AM-PAC Inpatient Mobility Raw Score : 16  AM-PAC Inpatient T-Scale Score : 40.78  Mobility Inpatient CMS 0-100% Score: 54.16  Mobility Inpatient CMS G-Code Modifier : CK     Goals  Patient Goals   Patient Goals : get stronger  Short Term Goals  Time Frame for Short Term Goals: 5-7 treatments/ week  Short Term Goal 1: pt to tolerate 1/2 hour of therapuetic exercise and activity keeping O2 sats above 90%  Short Term Goal 2: pt to demonstrate good technique for HEP for general strengthening, balance activities and energy conservation  Short Term Goal 3: pt to demonstrate independent bed mobility for position change  Short Term Goal 4: pt to demonstrate transfers w/ SBA x 1 using a wheeled walker and O2 as ordered  Short Term Goal 5: pt to demonstrate gait 30 - 50' w/ SBA x 1 using a wheeled walker and O2 as ordered  Short Term Goal 6: pt to demonstrate good ambulatory balance using a wheeled walker      Plan  Physical Therapy Plan  General Plan:  (5-7 treatments/ week)  Specific Instructions for Next Treatment: advance gait distance using wheeled walker, instruct in HEP  Current Treatment Recommendations: Strengthening, Gait training, Safety education & training, Balance training, Functional mobility training, Patient/Caregiver education & training, Equipment evaluation, education, & procurement, Transfer training, 
needed for toileting (which includes using toilet, bedpan, or urinal)?: Total  How much help is needed for putting on and taking off regular upper body clothing?: A Little  How much help is needed for taking care of personal grooming?: A Little  How much help for eating meals?: A Little  AM-[facility] Inpatient Daily Activity Raw Score: 16  AM-PAC Inpatient ADL T-Scale Score : 35.96  ADL Inpatient CMS 0-100% Score: 53.32  ADL Inpatient CMS G-Code Modifier : CK       Goals     Short Term Goals  Time Frame for Short Term Goals: By discharge  Short Term Goal 1: patient to safely perform BADLs with modifed independence and good safety  Short Term Goal 2: patient to safely perform functional transfers/mobility with use of least restrictive device during selfcare tasks  Short Term Goal 3: patient tolerate standing 4+ min during functional activity of choice with supervision and minimal pain to increase independence with selfcare task  Short Term Goal 4: patient to participate in 15+ min of therapeutic exercise/functional activity to increase independence with selfcare tasks  Short Term Goal 5: patient to verbalize/demonstrate good understanding fall prevention, energy conservation and home safety to increase independence/safety with selfcare tasks    Plan  Occupational Therapy Plan  Times Per Week: 3-5  Current Treatment Recommendations: Strengthening, ROM, Balance training, Functional mobility training, Endurance training, Pain management, Safety education & training, Patient/Caregiver education & training, Equipment evaluation, education, & procurement, Self-Care / ADL      OT Individual Minutes  OT Individual Minutes  Time In: 1019  Time Out: 1059  Minutes: 40  Time Code Minutes   Timed Code Treatment Minutes: 23 Minutes    Co-eval with PT    Electronically signed by LINDSAY Hays on 1/13/25 at 3:28 PM EST

## 2025-01-14 NOTE — CARE COORDINATION
Case Management Assessment  Initial Evaluation    Date/Time of Evaluation: 1/12/2025 11:35 AM  Assessment Completed by: Karolyn Porter RN    If patient is discharged prior to next notation, then this note serves as note for discharge by case management.    Patient Name: Mariangel Abreu                   YOB: 1950  Diagnosis: Generalized abdominal pain [R10.84]  Angina at rest (HCC) [I20.89]  Chest pain, unspecified type [R07.9]                   Date / Time: 1/11/2025  3:51 PM    Patient Admission Status: Inpatient   Readmission Risk (Low < 19, Mod (19-27), High > 27): Readmission Risk Score: 21.6    Current PCP: Carla Chua APRN - CNP  PCP verified by CM? Yes (Nuzhat Ramos, [facility])    Chart Reviewed: Yes      History Provided by: Patient  Patient Orientation: Alert and Oriented    Patient Cognition: Alert    Hospitalization in the last 30 days (Readmission):  No    If yes, Readmission Assessment in CM Navigator will be completed.    Advance Directives:      Code Status: Full Code   Patient's Primary Decision Maker is: Legal Next of Kin    Primary Decision Maker (Active): David Waller - Other Relative - [phone]    Discharge Planning:    Patient lives with: Family Members Type of Home: Apartment  Primary Care Giver: Self  Patient Support Systems include: Family Members   Current Financial resources: Medicare  Current community resources: Transportation  Current services prior to admission: Durable Medical Equipment, Transportation, Oxygen Therapy, C-pap            Current DME: Shower Chair, Walker, Glucometer, Home Aerosol, Oxygen Therapy (Comment), Other (Comment), Cpap (Scooter)            Type of Home Care services:  Nursing Services, OT, PT    ADLS  Prior functional level: Assistance with the following:, Mobility  Current functional level: Assistance with the following:, Mobility    PT AM-PAC:   /24  OT AM-PAC:   /24    Family can provide assistance at DC: No  Would 
ONGOING DISCHARGE PLAN:    Patient is alert and oriented x4.    Spoke with patient regarding discharge plan and patient confirms that plan is still to return to home w/ Family.    Pt. Will have [facility], [facility]. Per Enid, they can accept.    PT/OT on board.     Pt. Had Stress test today. Cardio on board.     Sating 96% on 2LNC. Wears 2LNC already at home from [employer]. May need a tank to go home with.     Pt. Has an apt already scheduled w/ her Primary w/ Carla Giraldo, on 1/15/25 @ 3PM.     Pt. Denies other needs at this time.     Will continue to follow for additional discharge needs.    If patient is discharged prior to next notation, then this note serves as note for discharge by case management.    Electronically signed by Vickie Morrell RN on 1/13/2025 at 12:38 PM   
Writer faxed Lindsay/DC med list to [facility]. Informed of Late DC yesterday & to call at home for start of care.    Writer did inform Enid from  of DC & she will follow.   
tablet  Commonly known as: RANEXA  Take 1 tablet by mouth 2 times daily   rosuvastatin 40 MG tablet  Commonly known as: CRESTOR  TAKE 1 TABLET BY MOUTH NIGHTLY   tiZANidine 4 MG tablet  Commonly known as: ZANAFLEX  Take 1 tablet by mouth 3 times daily as needed (muscle spasm)   topiramate 100 MG tablet  Commonly known as: TOPAMAX  GIVE 1 TABLET BY MOUTH ONE TIME A DAY FOR SEIZURES   UltiCare Mini Pen Needles 31G X 6 MM Misc  Generic drug: Insulin Pen Needle  Inject 5 times daily    very important  * This list has 2 medication(s) that are the same as other medications prescribed for you. Read the directions carefully, and ask your doctor or other care provider to review them with you.     Where to Get Your Medications      These medications were sent to [facility] - [place], [place] - [address] [phone] - F [phone] [address]  Phone: [phone]       cephALEXin 500 MG capsule      Printing Report

## 2025-01-14 NOTE — CARE COORDINATION
Care Transitions Note    Initial Call - Call within 2 business days of discharge: Yes    Patient Current Location:  Home: [address]  [place] [address]    Care Transition Nurse contacted the patient by telephone to perform post hospital discharge assessment, verified name and  as identifiers. Provided introduction to self, and explanation of the Care Transition Nurse role.     Patient: Mariangel Abreu    Patient : 1950   MRN: [id number]    Reason for Admission: chest pain  Discharge Date: 25  RURS: Readmission Risk Score: 22.5      Last Discharge Facility       Date Complaint Diagnosis Description Type Department Provider    25 Chest Pain Chest pain, unspecified type ... ED to Hosp-Admission (Discharged) (ADMITTED) Mejia Jo MD; Lucia Fountain..            Was this an external facility discharge? No    Additional needs identified to be addressed with provider   No needs identified             Method of communication with provider: none.    Patients top risk factors for readmission: functional cognitive ability, functional physical ability, and medication management    Interventions to address risk factors:   Education:   Record daily weights.  Notify your doctor if you have a weight gain 2 pounds in a day, or 3-5 pounds in 1 week. Notify your doctor for  increased shortness of breath, or swelling in legs or feet.  Follow a low sodium diet.  Resume normal activity unless otherwise instructed by your doctor.    If you have a bacterial infection, your doctor may prescribe an antibiotic. Antibiotics are powerful drugs and  when used correctly, they can save lives. But like all medications, they can have side effects.  FINISH TAKING ALL YOUR ANTIBIOTICS  Feeling better doesn't mean your infection is gone and if you stop taking your antibiotics without your  physician's direction, your infection could still be active in your body, causing you to develop an

## 2025-01-15 LAB
EKG ATRIAL RATE: 91 BPM
EKG P AXIS: 65 DEGREES
EKG P-R INTERVAL: 132 MS
EKG Q-T INTERVAL: 328 MS
EKG QRS DURATION: 68 MS
EKG QTC CALCULATION (BAZETT): 403 MS
EKG R AXIS: -64 DEGREES
EKG T AXIS: 88 DEGREES
EKG VENTRICULAR RATE: 91 BPM

## 2025-01-15 PROCEDURE — 93010 ELECTROCARDIOGRAM REPORT: CPT | Performed by: INTERNAL MEDICINE

## 2025-01-17 ENCOUNTER — HOSPITAL ENCOUNTER (OUTPATIENT)
Dept: PAIN MANAGEMENT | Age: 75
Discharge: HOME | End: 2025-01-17
Payer: MEDICARE

## 2025-01-17 VITALS — BODY MASS INDEX: 38.83 KG/M2 | WEIGHT: 211 LBS | HEIGHT: 62 IN

## 2025-01-17 DIAGNOSIS — M50.30 DDD (DEGENERATIVE DISC DISEASE), CERVICAL: Chronic | ICD-10-CM

## 2025-01-17 DIAGNOSIS — G89.29 CHRONIC PAIN OF LEFT KNEE: ICD-10-CM

## 2025-01-17 DIAGNOSIS — M47.817 LUMBOSACRAL SPONDYLOSIS WITHOUT MYELOPATHY: Primary | Chronic | ICD-10-CM

## 2025-01-17 DIAGNOSIS — M46.1 SACROILIITIS, NOT ELSEWHERE CLASSIFIED (HCC): Chronic | ICD-10-CM

## 2025-01-17 DIAGNOSIS — M25.562 CHRONIC PAIN OF LEFT KNEE: ICD-10-CM

## 2025-01-17 DIAGNOSIS — M54.16 LUMBAR RADICULOPATHY, CHRONIC: ICD-10-CM

## 2025-01-17 DIAGNOSIS — Z79.891 CHRONIC USE OF OPIATE FOR THERAPEUTIC PURPOSE: ICD-10-CM

## 2025-01-17 PROCEDURE — 99213 OFFICE O/P EST LOW 20 MIN: CPT | Performed by: NURSE PRACTITIONER

## 2025-01-17 PROCEDURE — 99213 OFFICE O/P EST LOW 20 MIN: CPT

## 2025-01-17 RX ORDER — OXYCODONE AND ACETAMINOPHEN 5; 325 MG/1; MG/1
1 TABLET ORAL EVERY 8 HOURS PRN
Qty: 90 TABLET | Refills: 0 | Status: SHIPPED | OUTPATIENT
Start: 2025-01-19 | End: 2025-02-18

## 2025-01-17 ASSESSMENT — ENCOUNTER SYMPTOMS
SHORTNESS OF BREATH: 0
CONSTIPATION: 0
COUGH: 0
BACK PAIN: 1

## 2025-01-17 NOTE — PROGRESS NOTES
Chief Complaint   Patient presents with    Back Pain     Med refill         PMH     Hx of atrial fibrillation, CKD stage III, CHF, COPD, DM CVA MI with 4 stents and on brilinta  chronic hypoxic respiratory failure and HIGINIO on CPAP - has narcan 4/16/2024     Pt c/o low back pain that radiates down legs. No known injury or surgery to the area with onset more than 1 year ago and has progressively worsened  MRI 10- with multilevel degenerative changes and Right paracentral disc extrusion L4-5 narrowing the right lateral recess.   She is not interested in seeing NS for opinion and declines to update imaging even though reporting worsening leg weakness and falls.    Patient states that she had a bad experience with an injection in the past and is not interested in any interventional pain procedures   Recent hospital admission for TIA but work up did not show stroke. She was dx with TMJ.  Hospital admission earlier this month for chest pain.      Back Pain  This is a chronic problem. The current episode started more than 1 year ago. The problem occurs constantly. The problem is unchanged. The pain is present in the lumbar spine. The quality of the pain is described as aching and shooting (throbbing). The pain radiates to the right foot, right knee and right thigh. The pain is at a severity of 8/10. The pain is moderate. The pain is The same all the time. The symptoms are aggravated by bending, standing and position (walking). Stiffness is present At night. Associated symptoms include headaches, leg pain and weakness. Pertinent negatives include no chest pain, fever, numbness or tingling. She has tried analgesics and heat for the symptoms. The treatment provided mild relief.     Patient denies any new neurological symptoms. No bowel or bladder incontinence, no weakness, and no falling.    Pill count: appropriate Percocet-15 - due 1/19     Morphine equivalent: 22.5    Controlled Substance Monitoring:    Acute

## 2025-01-21 ENCOUNTER — CARE COORDINATION (OUTPATIENT)
Dept: CARE COORDINATION | Age: 75
End: 2025-01-21

## 2025-01-21 NOTE — CARE COORDINATION
Care Transitions Note    Follow Up Call     Attempted to reach patient for transitions of care follow up.  Unable to reach patient.      Outreach Attempts:   Unable to leave message.     Care Summary Note: # 1 attempt- unable to reach patient, unable to leave a message    Follow Up Appointment:   Future Appointments         Provider Specialty Dept Phone    2/17/2025 4:20 PM (Arrive by 3:20 PM) Karen Cardoso, APRN - CNP Pain Management [phone]    3/13/2025 10:20 AM Rhett Moss MD Neurology [phone]            Plan for follow-up on next business day.  based on severity of symptoms and risk factors. Plan for next call:  2nd attempt sub call    Zamzam Hollingsworth RN

## 2025-01-22 ENCOUNTER — CARE COORDINATION (OUTPATIENT)
Dept: CARE COORDINATION | Age: 75
End: 2025-01-22

## 2025-01-22 NOTE — CARE COORDINATION
Care Transitions Note    Final Call     Attempted to reach patient for transitions of care follow up.  Unable to reach patient.      Outreach Attempts:   Multiple attempts to contact patient at phone numbers on file.     Patient closed (unable to reach patient) from the Care Transitions program on 1/22/26.  Patient/family    .      Handoff:   Patient was not referred to the ACM team due to unable to contact patient.      Care Summary Note: Attempted to reach patient for subsequent call. Left [employer] appropriate message with contact information requesting return call to [phone]   2 unsuccessful attempts to reach patient, ctn episode resolved//JU  Assessments:       Upcoming Appointments:    Future Appointments         Provider Specialty Dept Phone    2/18/2025 3:40 PM (Arrive by 2:40 PM) Karen Cardoso, APRN - CNP Pain Management [phone]    3/13/2025 10:20 AM Rhett Moss MD Neurology [phone]            Zamzam Hollingsworth RN

## 2025-02-03 RX ORDER — ROSUVASTATIN CALCIUM 40 MG/1
40 TABLET, COATED ORAL NIGHTLY
Qty: 30 TABLET | Refills: 3 | OUTPATIENT
Start: 2025-02-03

## 2025-02-04 RX ORDER — RANOLAZINE 500 MG/1
500 TABLET, EXTENDED RELEASE ORAL 2 TIMES DAILY
Qty: 60 TABLET | Refills: 3 | OUTPATIENT
Start: 2025-02-04

## 2025-02-11 DIAGNOSIS — Z79.4 TYPE 2 DIABETES MELLITUS WITH DIABETIC POLYNEUROPATHY, WITH LONG-TERM CURRENT USE OF INSULIN (HCC): ICD-10-CM

## 2025-02-11 DIAGNOSIS — E11.42 TYPE 2 DIABETES MELLITUS WITH DIABETIC POLYNEUROPATHY, WITH LONG-TERM CURRENT USE OF INSULIN (HCC): ICD-10-CM

## 2025-02-11 RX ORDER — INSULIN GLARGINE 100 [IU]/ML
INJECTION, SOLUTION SUBCUTANEOUS
Qty: 45 ADJUSTABLE DOSE PRE-FILLED PEN SYRINGE | Refills: 2 | Status: ON HOLD | OUTPATIENT
Start: 2025-02-11 | End: 2026-02-11

## 2025-02-11 RX ORDER — ROSUVASTATIN CALCIUM 40 MG/1
40 TABLET, COATED ORAL NIGHTLY
Qty: 30 TABLET | Refills: 5 | Status: ON HOLD | OUTPATIENT
Start: 2025-02-11 | End: 2026-02-11

## 2025-02-13 RX ORDER — RANOLAZINE 500 MG/1
500 TABLET, EXTENDED RELEASE ORAL 2 TIMES DAILY
Qty: 60 TABLET | Refills: 3 | OUTPATIENT
Start: 2025-02-13

## 2025-02-16 ENCOUNTER — APPOINTMENT (OUTPATIENT)
Dept: GENERAL RADIOLOGY | Age: 75
DRG: 641 | End: 2025-02-16
Payer: MEDICARE

## 2025-02-16 ENCOUNTER — HOSPITAL ENCOUNTER (INPATIENT)
Age: 75
LOS: 3 days | Discharge: NURSING, REHAB OR LONG-TERM CARE | DRG: 641 | End: 2025-02-19
Attending: EMERGENCY MEDICINE | Admitting: INTERNAL MEDICINE
Payer: MEDICARE

## 2025-02-16 ENCOUNTER — APPOINTMENT (OUTPATIENT)
Dept: CT IMAGING | Age: 75
DRG: 641 | End: 2025-02-16
Payer: MEDICARE

## 2025-02-16 DIAGNOSIS — R55 SYNCOPE AND COLLAPSE: Primary | ICD-10-CM

## 2025-02-16 DIAGNOSIS — R07.9 CHEST PAIN, UNSPECIFIED TYPE: ICD-10-CM

## 2025-02-16 DIAGNOSIS — M46.1 SACROILIITIS, NOT ELSEWHERE CLASSIFIED: Chronic | ICD-10-CM

## 2025-02-16 DIAGNOSIS — M47.817 LUMBOSACRAL SPONDYLOSIS WITHOUT MYELOPATHY: Chronic | ICD-10-CM

## 2025-02-16 DIAGNOSIS — M50.30 DDD (DEGENERATIVE DISC DISEASE), CERVICAL: Chronic | ICD-10-CM

## 2025-02-16 DIAGNOSIS — M54.16 LUMBAR RADICULOPATHY, CHRONIC: ICD-10-CM

## 2025-02-16 LAB
ALBUMIN SERPL-MCNC: 3.6 G/DL (ref 3.5–5.2)
ALP SERPL-CCNC: 80 U/L (ref 35–104)
ALT SERPL-CCNC: 14 U/L (ref 10–35)
ANION GAP SERPL CALCULATED.3IONS-SCNC: 12 MMOL/L (ref 9–16)
AST SERPL-CCNC: 24 U/L (ref 10–35)
BACTERIA URNS QL MICRO: ABNORMAL
BASOPHILS # BLD: 0.1 K/UL (ref 0–0.2)
BASOPHILS NFR BLD: 1 % (ref 0–2)
BILIRUB SERPL-MCNC: 0.7 MG/DL (ref 0–1.2)
BILIRUB UR QL STRIP: NEGATIVE
BUN SERPL-MCNC: 18 MG/DL (ref 8–23)
CALCIUM SERPL-MCNC: 9.3 MG/DL (ref 8.6–10.4)
CASTS #/AREA URNS LPF: ABNORMAL /LPF
CHLORIDE SERPL-SCNC: 93 MMOL/L (ref 98–107)
CK SERPL-CCNC: 28 U/L (ref 26–192)
CLARITY UR: CLEAR
CO2 SERPL-SCNC: 25 MMOL/L (ref 20–31)
COLOR UR: YELLOW
CREAT SERPL-MCNC: 1.2 MG/DL (ref 0.7–1.2)
D DIMER PPP FEU-MCNC: 0.72 UG/ML FEU (ref 0–0.59)
EOSINOPHIL # BLD: 0.1 K/UL (ref 0–0.4)
EOSINOPHILS RELATIVE PERCENT: 1 % (ref 0–4)
EPI CELLS #/AREA URNS HPF: ABNORMAL /HPF
ERYTHROCYTE [DISTWIDTH] IN BLOOD BY AUTOMATED COUNT: 14 % (ref 11.5–14.9)
GFR, ESTIMATED: 47 ML/MIN/1.73M2
GLUCOSE BLD-MCNC: 304 MG/DL (ref 65–105)
GLUCOSE SERPL-MCNC: 386 MG/DL (ref 74–99)
GLUCOSE UR STRIP-MCNC: ABNORMAL MG/DL
HCT VFR BLD AUTO: 46.9 % (ref 36–46)
HGB BLD-MCNC: 15 G/DL (ref 12–16)
HGB UR QL STRIP.AUTO: NEGATIVE
INR PPP: 1
KETONES UR STRIP-MCNC: ABNORMAL MG/DL
LEUKOCYTE ESTERASE UR QL STRIP: ABNORMAL
LYMPHOCYTES NFR BLD: 1.8 K/UL (ref 1–4.8)
LYMPHOCYTES RELATIVE PERCENT: 16 % (ref 24–44)
MCH RBC QN AUTO: 29.5 PG (ref 26–34)
MCHC RBC AUTO-ENTMCNC: 32.1 G/DL (ref 31–37)
MCV RBC AUTO: 92 FL (ref 80–100)
MONOCYTES NFR BLD: 0.9 K/UL (ref 0.1–1.3)
MONOCYTES NFR BLD: 8 % (ref 1–7)
MYOGLOBIN SERPL-MCNC: 46 NG/ML (ref 25–58)
NEUTROPHILS NFR BLD: 74 % (ref 36–66)
NEUTS SEG NFR BLD: 8.6 K/UL (ref 1.3–9.1)
NITRITE UR QL STRIP: NEGATIVE
PARTIAL THROMBOPLASTIN TIME: 21.4 SEC (ref 24–36)
PH UR STRIP: 5 [PH] (ref 5–8)
PLATELET # BLD AUTO: 166 K/UL (ref 150–450)
PMV BLD AUTO: 10 FL (ref 6–12)
POTASSIUM SERPL-SCNC: 4.5 MMOL/L (ref 3.7–5.3)
PROT SERPL-MCNC: 7.3 G/DL (ref 6.6–8.7)
PROT UR STRIP-MCNC: ABNORMAL MG/DL
PROTHROMBIN TIME: 14.5 SEC (ref 11.8–14.6)
RBC # BLD AUTO: 5.09 M/UL (ref 4–5.2)
RBC #/AREA URNS HPF: ABNORMAL /HPF
SODIUM SERPL-SCNC: 130 MMOL/L (ref 136–145)
SP GR UR STRIP: 1.03 (ref 1–1.03)
TROPONIN I SERPL HS-MCNC: 17 NG/L (ref 0–14)
TROPONIN I SERPL HS-MCNC: 18 NG/L (ref 0–14)
UROBILINOGEN UR STRIP-ACNC: NORMAL EU/DL (ref 0–1)
WBC #/AREA URNS HPF: ABNORMAL /HPF
WBC OTHER # BLD: 11.5 K/UL (ref 3.5–11)

## 2025-02-16 PROCEDURE — 87086 URINE CULTURE/COLONY COUNT: CPT

## 2025-02-16 PROCEDURE — 85025 COMPLETE CBC W/AUTO DIFF WBC: CPT

## 2025-02-16 PROCEDURE — 85379 FIBRIN DEGRADATION QUANT: CPT

## 2025-02-16 PROCEDURE — 70450 CT HEAD/BRAIN W/O DYE: CPT

## 2025-02-16 PROCEDURE — 73564 X-RAY EXAM KNEE 4 OR MORE: CPT

## 2025-02-16 PROCEDURE — 2060000000 HC ICU INTERMEDIATE R&B

## 2025-02-16 PROCEDURE — 83874 ASSAY OF MYOGLOBIN: CPT

## 2025-02-16 PROCEDURE — 82947 ASSAY GLUCOSE BLOOD QUANT: CPT

## 2025-02-16 PROCEDURE — 85730 THROMBOPLASTIN TIME PARTIAL: CPT

## 2025-02-16 PROCEDURE — 84484 ASSAY OF TROPONIN QUANT: CPT

## 2025-02-16 PROCEDURE — 6370000000 HC RX 637 (ALT 250 FOR IP): Performed by: NURSE PRACTITIONER

## 2025-02-16 PROCEDURE — 71250 CT THORAX DX C-: CPT

## 2025-02-16 PROCEDURE — 99285 EMERGENCY DEPT VISIT HI MDM: CPT

## 2025-02-16 PROCEDURE — 93005 ELECTROCARDIOGRAM TRACING: CPT

## 2025-02-16 PROCEDURE — 72125 CT NECK SPINE W/O DYE: CPT

## 2025-02-16 PROCEDURE — 80053 COMPREHEN METABOLIC PANEL: CPT

## 2025-02-16 PROCEDURE — 81001 URINALYSIS AUTO W/SCOPE: CPT

## 2025-02-16 PROCEDURE — 36415 COLL VENOUS BLD VENIPUNCTURE: CPT

## 2025-02-16 PROCEDURE — 2500000003 HC RX 250 WO HCPCS: Performed by: NURSE PRACTITIONER

## 2025-02-16 PROCEDURE — 85610 PROTHROMBIN TIME: CPT

## 2025-02-16 PROCEDURE — 82550 ASSAY OF CK (CPK): CPT

## 2025-02-16 RX ORDER — RANOLAZINE 500 MG/1
500 TABLET, EXTENDED RELEASE ORAL 2 TIMES DAILY
Status: DISCONTINUED | OUTPATIENT
Start: 2025-02-16 | End: 2025-02-19 | Stop reason: HOSPADM

## 2025-02-16 RX ORDER — INSULIN GLARGINE 100 [IU]/ML
50 INJECTION, SOLUTION SUBCUTANEOUS EVERY MORNING
Status: DISCONTINUED | OUTPATIENT
Start: 2025-02-17 | End: 2025-02-19 | Stop reason: HOSPADM

## 2025-02-16 RX ORDER — ENOXAPARIN SODIUM 100 MG/ML
40 INJECTION SUBCUTANEOUS DAILY
Status: DISCONTINUED | OUTPATIENT
Start: 2025-02-17 | End: 2025-02-19 | Stop reason: HOSPADM

## 2025-02-16 RX ORDER — ASPIRIN 81 MG/1
81 TABLET ORAL DAILY
Status: DISCONTINUED | OUTPATIENT
Start: 2025-02-17 | End: 2025-02-19 | Stop reason: HOSPADM

## 2025-02-16 RX ORDER — AMLODIPINE BESYLATE 2.5 MG/1
2.5 TABLET ORAL DAILY
Status: DISCONTINUED | OUTPATIENT
Start: 2025-02-17 | End: 2025-02-19 | Stop reason: HOSPADM

## 2025-02-16 RX ORDER — METOPROLOL TARTRATE 25 MG/1
12.5 TABLET, FILM COATED ORAL 2 TIMES DAILY
Status: DISCONTINUED | OUTPATIENT
Start: 2025-02-16 | End: 2025-02-19 | Stop reason: HOSPADM

## 2025-02-16 RX ORDER — SODIUM CHLORIDE 9 MG/ML
INJECTION, SOLUTION INTRAVENOUS PRN
Status: DISCONTINUED | OUTPATIENT
Start: 2025-02-16 | End: 2025-02-19 | Stop reason: HOSPADM

## 2025-02-16 RX ORDER — ESCITALOPRAM OXALATE 20 MG/1
20 TABLET ORAL DAILY
Status: DISCONTINUED | OUTPATIENT
Start: 2025-02-17 | End: 2025-02-19 | Stop reason: HOSPADM

## 2025-02-16 RX ORDER — DOCUSATE SODIUM 100 MG/1
100 CAPSULE, LIQUID FILLED ORAL 2 TIMES DAILY PRN
Status: DISCONTINUED | OUTPATIENT
Start: 2025-02-16 | End: 2025-02-19 | Stop reason: HOSPADM

## 2025-02-16 RX ORDER — BUDESONIDE AND FORMOTEROL FUMARATE DIHYDRATE 160; 4.5 UG/1; UG/1
2 AEROSOL RESPIRATORY (INHALATION)
Status: CANCELLED | OUTPATIENT
Start: 2025-02-17

## 2025-02-16 RX ORDER — OXYCODONE AND ACETAMINOPHEN 5; 325 MG/1; MG/1
1 TABLET ORAL EVERY 8 HOURS PRN
Status: DISCONTINUED | OUTPATIENT
Start: 2025-02-16 | End: 2025-02-19 | Stop reason: HOSPADM

## 2025-02-16 RX ORDER — ALBUTEROL SULFATE 0.83 MG/ML
2.5 SOLUTION RESPIRATORY (INHALATION) EVERY 6 HOURS PRN
Status: DISCONTINUED | OUTPATIENT
Start: 2025-02-16 | End: 2025-02-17

## 2025-02-16 RX ORDER — INSULIN GLARGINE 100 [IU]/ML
45 INJECTION, SOLUTION SUBCUTANEOUS NIGHTLY
Status: DISCONTINUED | OUTPATIENT
Start: 2025-02-16 | End: 2025-02-19 | Stop reason: HOSPADM

## 2025-02-16 RX ORDER — ACETAMINOPHEN 325 MG/1
650 TABLET ORAL EVERY 6 HOURS PRN
Status: DISCONTINUED | OUTPATIENT
Start: 2025-02-16 | End: 2025-02-19 | Stop reason: HOSPADM

## 2025-02-16 RX ORDER — ISOSORBIDE MONONITRATE 60 MG/1
60 TABLET, EXTENDED RELEASE ORAL DAILY
Status: DISCONTINUED | OUTPATIENT
Start: 2025-02-17 | End: 2025-02-19 | Stop reason: HOSPADM

## 2025-02-16 RX ORDER — DEXTROSE MONOHYDRATE 100 MG/ML
INJECTION, SOLUTION INTRAVENOUS CONTINUOUS PRN
Status: DISCONTINUED | OUTPATIENT
Start: 2025-02-16 | End: 2025-02-19 | Stop reason: HOSPADM

## 2025-02-16 RX ORDER — ACETAMINOPHEN 650 MG/1
650 SUPPOSITORY RECTAL EVERY 6 HOURS PRN
Status: DISCONTINUED | OUTPATIENT
Start: 2025-02-16 | End: 2025-02-19 | Stop reason: HOSPADM

## 2025-02-16 RX ORDER — IPRATROPIUM BROMIDE AND ALBUTEROL SULFATE 2.5; .5 MG/3ML; MG/3ML
1 SOLUTION RESPIRATORY (INHALATION) EVERY 4 HOURS PRN
Status: DISCONTINUED | OUTPATIENT
Start: 2025-02-16 | End: 2025-02-19 | Stop reason: HOSPADM

## 2025-02-16 RX ORDER — POTASSIUM CHLORIDE 7.45 MG/ML
10 INJECTION INTRAVENOUS PRN
Status: DISCONTINUED | OUTPATIENT
Start: 2025-02-16 | End: 2025-02-19 | Stop reason: HOSPADM

## 2025-02-16 RX ORDER — TOPIRAMATE 100 MG/1
100 TABLET, FILM COATED ORAL DAILY
Status: DISCONTINUED | OUTPATIENT
Start: 2025-02-17 | End: 2025-02-19 | Stop reason: HOSPADM

## 2025-02-16 RX ORDER — ROSUVASTATIN CALCIUM 40 MG/1
40 TABLET, COATED ORAL NIGHTLY
Status: DISCONTINUED | OUTPATIENT
Start: 2025-02-16 | End: 2025-02-19 | Stop reason: HOSPADM

## 2025-02-16 RX ORDER — INSULIN LISPRO 100 [IU]/ML
0-8 INJECTION, SOLUTION INTRAVENOUS; SUBCUTANEOUS
Status: DISCONTINUED | OUTPATIENT
Start: 2025-02-16 | End: 2025-02-17

## 2025-02-16 RX ORDER — MIDODRINE HYDROCHLORIDE 2.5 MG/1
2.5 TABLET ORAL
Status: DISCONTINUED | OUTPATIENT
Start: 2025-02-17 | End: 2025-02-19 | Stop reason: HOSPADM

## 2025-02-16 RX ORDER — BUDESONIDE AND FORMOTEROL FUMARATE DIHYDRATE 80; 4.5 UG/1; UG/1
2 AEROSOL RESPIRATORY (INHALATION)
Status: DISCONTINUED | OUTPATIENT
Start: 2025-02-16 | End: 2025-02-19 | Stop reason: HOSPADM

## 2025-02-16 RX ORDER — POLYETHYLENE GLYCOL 3350 17 G/17G
17 POWDER, FOR SOLUTION ORAL DAILY PRN
Status: DISCONTINUED | OUTPATIENT
Start: 2025-02-16 | End: 2025-02-19 | Stop reason: HOSPADM

## 2025-02-16 RX ORDER — SODIUM CHLORIDE 0.9 % (FLUSH) 0.9 %
5-40 SYRINGE (ML) INJECTION PRN
Status: DISCONTINUED | OUTPATIENT
Start: 2025-02-16 | End: 2025-02-19 | Stop reason: HOSPADM

## 2025-02-16 RX ORDER — MAGNESIUM SULFATE HEPTAHYDRATE 40 MG/ML
2000 INJECTION, SOLUTION INTRAVENOUS PRN
Status: DISCONTINUED | OUTPATIENT
Start: 2025-02-16 | End: 2025-02-19 | Stop reason: HOSPADM

## 2025-02-16 RX ORDER — ALOGLIPTIN 12.5 MG/1
12.5 TABLET, FILM COATED ORAL DAILY
Status: DISCONTINUED | OUTPATIENT
Start: 2025-02-17 | End: 2025-02-19 | Stop reason: HOSPADM

## 2025-02-16 RX ORDER — SODIUM CHLORIDE 0.9 % (FLUSH) 0.9 %
5-40 SYRINGE (ML) INJECTION EVERY 12 HOURS SCHEDULED
Status: DISCONTINUED | OUTPATIENT
Start: 2025-02-16 | End: 2025-02-19 | Stop reason: HOSPADM

## 2025-02-16 RX ORDER — OXYBUTYNIN CHLORIDE 5 MG/1
5 TABLET, EXTENDED RELEASE ORAL DAILY
Status: DISCONTINUED | OUTPATIENT
Start: 2025-02-17 | End: 2025-02-19 | Stop reason: HOSPADM

## 2025-02-16 RX ORDER — POTASSIUM CHLORIDE 1500 MG/1
40 TABLET, EXTENDED RELEASE ORAL PRN
Status: DISCONTINUED | OUTPATIENT
Start: 2025-02-16 | End: 2025-02-19 | Stop reason: HOSPADM

## 2025-02-16 RX ORDER — PANTOPRAZOLE SODIUM 40 MG/1
40 TABLET, DELAYED RELEASE ORAL
Status: DISCONTINUED | OUTPATIENT
Start: 2025-02-17 | End: 2025-02-19 | Stop reason: HOSPADM

## 2025-02-16 RX ORDER — GABAPENTIN 100 MG/1
100 CAPSULE ORAL 2 TIMES DAILY
Status: DISCONTINUED | OUTPATIENT
Start: 2025-02-16 | End: 2025-02-19 | Stop reason: HOSPADM

## 2025-02-16 RX ADMIN — GABAPENTIN 100 MG: 100 CAPSULE ORAL at 23:04

## 2025-02-16 RX ADMIN — INSULIN GLARGINE 45 UNITS: 100 INJECTION, SOLUTION SUBCUTANEOUS at 23:06

## 2025-02-16 RX ADMIN — TICAGRELOR 90 MG: 90 TABLET ORAL at 23:05

## 2025-02-16 RX ADMIN — ROSUVASTATIN 40 MG: 40 TABLET, FILM COATED ORAL at 23:04

## 2025-02-16 RX ADMIN — MICONAZOLE NITRATE: 20 POWDER TOPICAL at 23:07

## 2025-02-16 RX ADMIN — Medication 10.5 MG: at 23:10

## 2025-02-16 RX ADMIN — RANOLAZINE 500 MG: 500 TABLET, EXTENDED RELEASE ORAL at 23:05

## 2025-02-16 RX ADMIN — SODIUM CHLORIDE, PRESERVATIVE FREE 10 ML: 5 INJECTION INTRAVENOUS at 23:06

## 2025-02-16 RX ADMIN — OXYCODONE HYDROCHLORIDE AND ACETAMINOPHEN 1 TABLET: 5; 325 TABLET ORAL at 23:05

## 2025-02-16 RX ADMIN — METOPROLOL TARTRATE 12.5 MG: 25 TABLET, FILM COATED ORAL at 23:04

## 2025-02-16 RX ADMIN — INSULIN LISPRO 2 UNITS: 100 INJECTION, SOLUTION INTRAVENOUS; SUBCUTANEOUS at 23:06

## 2025-02-16 ASSESSMENT — PAIN DESCRIPTION - PAIN TYPE
TYPE: ACUTE PAIN
TYPE: ACUTE PAIN

## 2025-02-16 ASSESSMENT — PAIN DESCRIPTION - LOCATION
LOCATION: LEG;BACK
LOCATION: BACK
LOCATION: BACK;LEG;FOOT

## 2025-02-16 ASSESSMENT — PAIN DESCRIPTION - ORIENTATION
ORIENTATION: RIGHT;LEFT
ORIENTATION: RIGHT;LEFT
ORIENTATION: LOWER

## 2025-02-16 ASSESSMENT — PAIN DESCRIPTION - DESCRIPTORS
DESCRIPTORS: SHARP
DESCRIPTORS: ACHING;DISCOMFORT
DESCRIPTORS: ACHING;DISCOMFORT

## 2025-02-16 ASSESSMENT — PAIN DESCRIPTION - ONSET
ONSET: ON-GOING
ONSET: ON-GOING

## 2025-02-16 ASSESSMENT — PAIN - FUNCTIONAL ASSESSMENT
PAIN_FUNCTIONAL_ASSESSMENT: 0-10
PAIN_FUNCTIONAL_ASSESSMENT: ACTIVITIES ARE NOT PREVENTED
PAIN_FUNCTIONAL_ASSESSMENT: PREVENTS OR INTERFERES SOME ACTIVE ACTIVITIES AND ADLS

## 2025-02-16 ASSESSMENT — PAIN DESCRIPTION - FREQUENCY
FREQUENCY: INTERMITTENT
FREQUENCY: INTERMITTENT

## 2025-02-16 ASSESSMENT — PAIN SCALES - GENERAL
PAINLEVEL_OUTOF10: 8
PAINLEVEL_OUTOF10: 9
PAINLEVEL_OUTOF10: 9

## 2025-02-16 NOTE — ED PROVIDER NOTES
[facility] EMERGENCY DEPARTMENT  eMERGENCY dEPARTMENT eNCOUnter   Attending Attestation     Pt Name: Mariangel Abreu  MRN: [id number]  Birthdate 1950  Date of evaluation: 2/16/25       Mariangel Abreu is a 74 y.o. female who presents with Fall, Bilateral Leg Weakness, Lightheadedness, and Back Pain      History:   Patient brought in by ambulance because she fell sometime last night.  She is not clear whether she passed out or not she thinks maybe her legs just gave out which has happened a couple times.  Patient is not sure what happened but she was on the ground all day until she was finally able to get up and call someone.  Patient is complaining of some back pain and also bilateral knee pain.    Exam: Vitals:   Vitals:    02/16/25 1706   BP: (!) 132/110   Pulse: 90   Resp: 14   Temp: 98.2 °F (36.8 °C)   TempSrc: Oral   SpO2: 92%   Weight: 97.1 kg (214 lb)   Height: 1.575 m (5' 2\")     Heart regular rate rhythm no murmurs.  Lungs clear to auscultation bilaterally.  Patient has no deformities noted.  Neurologically intact.    I performed a history and physical examination of the patient and discussed management with the resident. I reviewed the resident’s note and agree with the documented findings and plan of care. Any areas of disagreement are noted on the chart. I was personally present for the key portions of any procedures. I have documented in the chart those procedures where I was not present during the key portions. I have personally reviewed all images and agree with the resident's interpretation. I have reviewed the emergency nurses triage note. I agree with the chief complaint, past medical history, past surgical history, allergies, medications, social and family history as documented unless otherwise noted below. Documentation of the HPI, Physical Exam and Medical Decision Making performed by medical students or scribes is based on my personal performance of the HPI, PE and MDM. I

## 2025-02-16 NOTE — ED PROVIDER NOTES
[facility]  Emergency Department Encounter  Emergency Medicine Resident     Pt Name:Mariangel Abreu  MRN: [id number]  Birthdate 1950  Date of evaluation: 2/16/25  PCP:  Nuzhat Ramos DTR  Note Started: 6:24 PM EST      CHIEF COMPLAINT       Chief Complaint   Patient presents with    Fall    Bilateral Leg Weakness    Lightheadedness    Back Pain       HISTORY OF PRESENT ILLNESS  (Location/Symptom, Timing/Onset, Context/Setting, Quality, Duration, Modifying Factors, Severity.)      Mariangel Abreu is a 74 y.o. female who presents with syncope and collapse.  Patient reports that she was getting up to use the restroom and walking there and then later woke up on the ground.  She does not remember losing consciousness.  She does not remember falling.  She denies that she felt any symptoms prior to this episode happening.  She lives alone and it was unwitnessed.  She does not think she was on the ground for long but is unsure of the exact time she was unconscious.  Patient states that she has had similar episodes in the past.  She was able to call EMS to help.  Patient states that she believes she fell onto her knees because both of her knees hurt.  She does not think she hit her head because her head does not hurt.  She states that she has had episodes of her legs giving out in the past.  She denies chest pain or shortness of breath.  She denies abdominal pain, nausea or vomiting.  Patient denies any new weakness, numbness, tingling.  She denies head pain, neck pain, back pain.    PAST MEDICAL / SURGICAL / SOCIAL / FAMILY HISTORY      has a past medical history of Abdominal bloating, Adenomatous polyp of ascending colon, Allergic rhinitis, Anxiety, Arthritis, Asthma, Atrial fibrillation (HCC), Back pain, Benign hypertension with CKD (chronic kidney disease) stage III (HCC), CAD (coronary artery disease), Caffeine use, CHF (congestive heart failure) (HCC), Chronic kidney disease, CKD (chronic

## 2025-02-16 NOTE — ED TRIAGE NOTES
Mode of arrival (squad #, walk in, police, etc) : EMS        Chief complaint(s): fall, bilateral leg weakness, lightheadedness        Arrival Note (brief scenario, treatment PTA, etc).: Patient brought in by EMS from home (lives alone) c/o fall yesterday. Patient stated she is going to the bathroom and fell, she stated her legs prior to the fall felt weak and unable to remember the whole incident but does not think she hit her head. Patient reports low back pain. Patient is taking blood thinner. Patient reports lightheadedness this morning and concern that she might pass out. Patient has COPD and wears 3L of O2 per nc at home. Patient uses cane and electric scooter at baseline.        C= \"Have you ever felt that you should Cut down on your drinking?\"  No  A= \"Have people Annoyed you by criticizing your drinking?\"  No  G= \"Have you ever felt bad or Guilty about your drinking?\"  No  E= \"Have you ever had a drink as an Eye-opener first thing in the morning to steady your nerves or to help a hangover?\"  No      Deferred []      Reason for deferring: N/A    *If yes to two or more: probable alcohol abuse.*

## 2025-02-17 LAB
ANION GAP SERPL CALCULATED.3IONS-SCNC: 13 MMOL/L (ref 9–16)
BASOPHILS # BLD: 0.1 K/UL (ref 0–0.2)
BASOPHILS NFR BLD: 1 % (ref 0–2)
BUN SERPL-MCNC: 21 MG/DL (ref 8–23)
CALCIUM SERPL-MCNC: 9.1 MG/DL (ref 8.6–10.4)
CHLORIDE SERPL-SCNC: 93 MMOL/L (ref 98–107)
CO2 SERPL-SCNC: 26 MMOL/L (ref 20–31)
CREAT SERPL-MCNC: 1.1 MG/DL (ref 0.7–1.2)
EKG ATRIAL RATE: 88 BPM
EKG P AXIS: 60 DEGREES
EKG P-R INTERVAL: 126 MS
EKG Q-T INTERVAL: 432 MS
EKG QRS DURATION: 76 MS
EKG QTC CALCULATION (BAZETT): 522 MS
EKG R AXIS: -51 DEGREES
EKG T AXIS: 96 DEGREES
EKG VENTRICULAR RATE: 88 BPM
EOSINOPHIL # BLD: 0.2 K/UL (ref 0–0.4)
EOSINOPHILS RELATIVE PERCENT: 3 % (ref 0–4)
ERYTHROCYTE [DISTWIDTH] IN BLOOD BY AUTOMATED COUNT: 13.8 % (ref 11.5–14.9)
GFR, ESTIMATED: 52 ML/MIN/1.73M2
GLUCOSE BLD-MCNC: 101 MG/DL (ref 65–105)
GLUCOSE BLD-MCNC: 113 MG/DL (ref 65–105)
GLUCOSE BLD-MCNC: 175 MG/DL (ref 65–105)
GLUCOSE BLD-MCNC: 308 MG/DL (ref 65–105)
GLUCOSE BLD-MCNC: 410 MG/DL (ref 65–105)
GLUCOSE SERPL-MCNC: 300 MG/DL (ref 74–99)
HCT VFR BLD AUTO: 43.5 % (ref 36–46)
HGB BLD-MCNC: 14.3 G/DL (ref 12–16)
LYMPHOCYTES NFR BLD: 1.7 K/UL (ref 1–4.8)
LYMPHOCYTES RELATIVE PERCENT: 21 % (ref 24–44)
MCH RBC QN AUTO: 29.2 PG (ref 26–34)
MCHC RBC AUTO-ENTMCNC: 32.9 G/DL (ref 31–37)
MCV RBC AUTO: 88.7 FL (ref 80–100)
MICROORGANISM SPEC CULT: NORMAL
MONOCYTES NFR BLD: 0.9 K/UL (ref 0.1–1.3)
MONOCYTES NFR BLD: 11 % (ref 1–7)
NEUTROPHILS NFR BLD: 64 % (ref 36–66)
NEUTS SEG NFR BLD: 5.4 K/UL (ref 1.3–9.1)
PLATELET # BLD AUTO: 263 K/UL (ref 150–450)
PMV BLD AUTO: 9.3 FL (ref 6–12)
POTASSIUM SERPL-SCNC: 4 MMOL/L (ref 3.7–5.3)
RBC # BLD AUTO: 4.9 M/UL (ref 4–5.2)
SODIUM SERPL-SCNC: 132 MMOL/L (ref 136–145)
SPECIMEN DESCRIPTION: NORMAL
WBC OTHER # BLD: 8.4 K/UL (ref 3.5–11)

## 2025-02-17 PROCEDURE — 51798 US URINE CAPACITY MEASURE: CPT

## 2025-02-17 PROCEDURE — 6370000000 HC RX 637 (ALT 250 FOR IP): Performed by: NURSE PRACTITIONER

## 2025-02-17 PROCEDURE — 36415 COLL VENOUS BLD VENIPUNCTURE: CPT

## 2025-02-17 PROCEDURE — 2500000003 HC RX 250 WO HCPCS: Performed by: INTERNAL MEDICINE

## 2025-02-17 PROCEDURE — 2060000000 HC ICU INTERMEDIATE R&B

## 2025-02-17 PROCEDURE — 82947 ASSAY GLUCOSE BLOOD QUANT: CPT

## 2025-02-17 PROCEDURE — 97530 THERAPEUTIC ACTIVITIES: CPT

## 2025-02-17 PROCEDURE — 99223 1ST HOSP IP/OBS HIGH 75: CPT | Performed by: INTERNAL MEDICINE

## 2025-02-17 PROCEDURE — 6360000002 HC RX W HCPCS: Performed by: INTERNAL MEDICINE

## 2025-02-17 PROCEDURE — 85025 COMPLETE CBC W/AUTO DIFF WBC: CPT

## 2025-02-17 PROCEDURE — 6370000000 HC RX 637 (ALT 250 FOR IP): Performed by: INTERNAL MEDICINE

## 2025-02-17 PROCEDURE — 93005 ELECTROCARDIOGRAM TRACING: CPT | Performed by: INTERNAL MEDICINE

## 2025-02-17 PROCEDURE — 94761 N-INVAS EAR/PLS OXIMETRY MLT: CPT

## 2025-02-17 PROCEDURE — 2700000000 HC OXYGEN THERAPY PER DAY

## 2025-02-17 PROCEDURE — 97166 OT EVAL MOD COMPLEX 45 MIN: CPT

## 2025-02-17 PROCEDURE — 80048 BASIC METABOLIC PNL TOTAL CA: CPT

## 2025-02-17 PROCEDURE — 99223 1ST HOSP IP/OBS HIGH 75: CPT | Performed by: NURSE PRACTITIONER

## 2025-02-17 PROCEDURE — 2500000003 HC RX 250 WO HCPCS: Performed by: NURSE PRACTITIONER

## 2025-02-17 PROCEDURE — 93010 ELECTROCARDIOGRAM REPORT: CPT | Performed by: INTERNAL MEDICINE

## 2025-02-17 PROCEDURE — 6360000002 HC RX W HCPCS: Performed by: NURSE PRACTITIONER

## 2025-02-17 PROCEDURE — 97162 PT EVAL MOD COMPLEX 30 MIN: CPT

## 2025-02-17 PROCEDURE — 2580000003 HC RX 258: Performed by: INTERNAL MEDICINE

## 2025-02-17 PROCEDURE — 94640 AIRWAY INHALATION TREATMENT: CPT

## 2025-02-17 RX ORDER — ALBUTEROL SULFATE 90 UG/1
2 INHALANT RESPIRATORY (INHALATION) EVERY 6 HOURS PRN
Status: DISCONTINUED | OUTPATIENT
Start: 2025-02-17 | End: 2025-02-19 | Stop reason: HOSPADM

## 2025-02-17 RX ORDER — INSULIN LISPRO 100 [IU]/ML
0-16 INJECTION, SOLUTION INTRAVENOUS; SUBCUTANEOUS
Status: DISCONTINUED | OUTPATIENT
Start: 2025-02-17 | End: 2025-02-19 | Stop reason: HOSPADM

## 2025-02-17 RX ORDER — SODIUM CHLORIDE 9 MG/ML
INJECTION, SOLUTION INTRAVENOUS CONTINUOUS
Status: DISCONTINUED | OUTPATIENT
Start: 2025-02-17 | End: 2025-02-19 | Stop reason: HOSPADM

## 2025-02-17 RX ORDER — ONDANSETRON 4 MG/1
4 TABLET, FILM COATED ORAL EVERY 6 HOURS PRN
Status: DISCONTINUED | OUTPATIENT
Start: 2025-02-17 | End: 2025-02-17

## 2025-02-17 RX ORDER — METOCLOPRAMIDE 5 MG/1
5 TABLET ORAL EVERY 4 HOURS PRN
Status: DISCONTINUED | OUTPATIENT
Start: 2025-02-17 | End: 2025-02-19 | Stop reason: HOSPADM

## 2025-02-17 RX ADMIN — RANOLAZINE 500 MG: 500 TABLET, EXTENDED RELEASE ORAL at 19:51

## 2025-02-17 RX ADMIN — BUDESONIDE AND FORMOTEROL FUMARATE DIHYDRATE 2 PUFF: 80; 4.5 AEROSOL RESPIRATORY (INHALATION) at 19:14

## 2025-02-17 RX ADMIN — MIDODRINE HYDROCHLORIDE 2.5 MG: 2.5 TABLET ORAL at 12:31

## 2025-02-17 RX ADMIN — WATER 1000 MG: 1 INJECTION INTRAMUSCULAR; INTRAVENOUS; SUBCUTANEOUS at 17:03

## 2025-02-17 RX ADMIN — MICONAZOLE NITRATE: 20 POWDER TOPICAL at 19:50

## 2025-02-17 RX ADMIN — EMPAGLIFLOZIN 10 MG: 10 TABLET, FILM COATED ORAL at 08:36

## 2025-02-17 RX ADMIN — MICONAZOLE NITRATE: 20 POWDER TOPICAL at 08:43

## 2025-02-17 RX ADMIN — INSULIN LISPRO 16 UNITS: 100 INJECTION, SOLUTION INTRAVENOUS; SUBCUTANEOUS at 12:31

## 2025-02-17 RX ADMIN — OXYBUTYNIN CHLORIDE 5 MG: 5 TABLET, EXTENDED RELEASE ORAL at 08:36

## 2025-02-17 RX ADMIN — INSULIN LISPRO 6 UNITS: 100 INJECTION, SOLUTION INTRAVENOUS; SUBCUTANEOUS at 08:36

## 2025-02-17 RX ADMIN — TICAGRELOR 90 MG: 90 TABLET ORAL at 19:51

## 2025-02-17 RX ADMIN — METOPROLOL TARTRATE 12.5 MG: 25 TABLET, FILM COATED ORAL at 08:36

## 2025-02-17 RX ADMIN — ASPIRIN 81 MG: 81 TABLET, COATED ORAL at 08:36

## 2025-02-17 RX ADMIN — GABAPENTIN 100 MG: 100 CAPSULE ORAL at 08:35

## 2025-02-17 RX ADMIN — RANOLAZINE 500 MG: 500 TABLET, EXTENDED RELEASE ORAL at 08:36

## 2025-02-17 RX ADMIN — ISOSORBIDE MONONITRATE 60 MG: 60 TABLET, EXTENDED RELEASE ORAL at 08:35

## 2025-02-17 RX ADMIN — PANTOPRAZOLE SODIUM 40 MG: 40 TABLET, DELAYED RELEASE ORAL at 05:45

## 2025-02-17 RX ADMIN — GABAPENTIN 100 MG: 100 CAPSULE ORAL at 19:50

## 2025-02-17 RX ADMIN — AMLODIPINE BESYLATE 2.5 MG: 2.5 TABLET ORAL at 08:36

## 2025-02-17 RX ADMIN — ALBUTEROL SULFATE 2 PUFF: 90 AEROSOL, METERED RESPIRATORY (INHALATION) at 19:14

## 2025-02-17 RX ADMIN — METOCLOPRAMIDE 5 MG: 5 TABLET ORAL at 11:15

## 2025-02-17 RX ADMIN — INSULIN GLARGINE 50 UNITS: 100 INJECTION, SOLUTION SUBCUTANEOUS at 08:37

## 2025-02-17 RX ADMIN — PANTOPRAZOLE SODIUM 40 MG: 40 TABLET, DELAYED RELEASE ORAL at 17:03

## 2025-02-17 RX ADMIN — Medication 10.5 MG: at 20:15

## 2025-02-17 RX ADMIN — MIDODRINE HYDROCHLORIDE 2.5 MG: 2.5 TABLET ORAL at 17:04

## 2025-02-17 RX ADMIN — TOPIRAMATE 100 MG: 100 TABLET, FILM COATED ORAL at 08:35

## 2025-02-17 RX ADMIN — ALOGLIPTIN 12.5 MG: 12.5 TABLET, FILM COATED ORAL at 08:35

## 2025-02-17 RX ADMIN — TICAGRELOR 90 MG: 90 TABLET ORAL at 08:35

## 2025-02-17 RX ADMIN — METOPROLOL TARTRATE 12.5 MG: 25 TABLET, FILM COATED ORAL at 20:14

## 2025-02-17 RX ADMIN — ROSUVASTATIN 40 MG: 40 TABLET, FILM COATED ORAL at 19:51

## 2025-02-17 RX ADMIN — SODIUM CHLORIDE: 9 INJECTION, SOLUTION INTRAVENOUS at 17:06

## 2025-02-17 RX ADMIN — ENOXAPARIN SODIUM 40 MG: 100 INJECTION SUBCUTANEOUS at 08:44

## 2025-02-17 RX ADMIN — SODIUM CHLORIDE, PRESERVATIVE FREE 10 ML: 5 INJECTION INTRAVENOUS at 11:15

## 2025-02-17 RX ADMIN — ESCITALOPRAM OXALATE 20 MG: 20 TABLET ORAL at 08:35

## 2025-02-17 ASSESSMENT — PAIN SCALES - GENERAL: PAINLEVEL_OUTOF10: 0

## 2025-02-17 NOTE — PLAN OF CARE
Problem: Chronic Conditions and Co-morbidities  Goal: Patient's chronic conditions and co-morbidity symptoms are monitored and maintained or improved  2/17/2025 1453 by Suzie Ortega RN  Outcome: Progressing     Problem: Discharge Planning  Goal: Discharge to home or other facility with appropriate resources  2/17/2025 1453 by Suzie Ortega RN  Outcome: Progressing     Problem: Pain  Goal: Verbalizes/displays adequate comfort level or baseline comfort level  2/17/2025 1453 by Suzie Ortega RN  Outcome: Progressing     Problem: Skin/Tissue Integrity  Goal: Skin integrity remains intact  Description: 1.  Monitor for areas of redness and/or skin breakdown  2.  Assess vascular access sites hourly  3.  Every 4-6 hours minimum:  Change oxygen saturation probe site  4.  Every 4-6 hours:  If on nasal continuous positive airway pressure, respiratory therapy assess nares and determine need for appliance change or resting period  2/17/2025 1453 by Suzie Ortega RN  Outcome: Progressing     Problem: Safety - Adult  Goal: Free from fall injury  2/17/2025 1453 by Suzie Ortega RN  Outcome: Progressing

## 2025-02-17 NOTE — DISCHARGE INSTR - COC
Continuity of Care Form    Patient Name: Mariangel Aberu   :  1950  MRN:  [id number]    Admit date:  2025  Discharge date:  2025    Code Status Order: Full Code   Advance Directives:   Advance Care Flowsheet Documentation             Admitting Physician:  Rose Buckley MD  PCP: Nuzhat Ramos DTR    Discharging Nurse: Nicolle SINGLETARY  Discharging Hospital Unit/Room#: 2/-01  Discharging Unit Phone Number: [phone]    Emergency Contact:   Extended Emergency Contact Information  Primary Emergency Contact: ENRICO KIRKLAND  Address: [facility]  Home Phone: [phone]  Relation: Grandchild  Secondary Emergency Contact: Yvonne Kirkland  Home Phone: [phone]  Relation: Grandchild    Past Surgical History:  Past Surgical History:   Procedure Laterality Date    ABLATION OF DYSRHYTHMIC FOCUS      CARDIAC PROCEDURE N/A 2024    klarissa / Left heart cath / coronary angiography / stemi er 22 performed by Victoriano Dee MD at [facility] CARDIAC CATH LAB    CARDIAC PROCEDURE N/A 3/11/2024    klarissa / Percutaneous coronary intervention performed by Victoriano Dee MD at [facility] CARDIAC CATH LAB    CARPAL TUNNEL RELEASE Right     CATARACT REMOVAL WITH IMPLANT Bilateral     CHOLECYSTECTOMY      COLONOSCOPY      COLONOSCOPY  04/10/2017    tubular adenomo polyp , mod. sigmoid diverticulosis, min. int. hemorrhoids    COLONOSCOPY N/A 3/2/2021    COLONOSCOPY WITH BIOPSY performed by Moris Brenner MD at [facility] ENDO    CYSTOSCOPY  2013    DILATION AND CURETTAGE  1979    EYE SURGERY      laser    INCONTINENCE SURGERY      Percutaneous nerve stimulation Dr Ghosh 2013     INSERTABLE CARDIAC MONITOR  2015    [employer] REVEAL LINQ MODEL #[id number] MRI CONDTIONAL OK 3T. IMMEDIATELY POST IMPLANT    OTHER SURGICAL HISTORY  09/10/15    removal of interstim    OK COLSC FLX W/REMOVAL LESION BY HOT BX FORCEPS N/A 4/10/2017    COLONOSCOPY POLYPECTOMY HOT BIOPSY performed by Ksenia Marroquin MD at [facility]

## 2025-02-17 NOTE — ACP (ADVANCE CARE PLANNING)
be made to restart your heart (answer \"yes\" for attempt to resuscitate) or would you prefer a natural death (answer \"no\" for do not attempt to resuscitate)?\" yes       [] Yes   [] No   Educated Patient / Decision Maker regarding differences between Advance Directives and portable DNR orders.    Conversation Outcomes:  ACP discussion completed    Follow-up plan:    [] Schedule follow-up conversation to continue planning  [x] Referred individual to Provider for additional questions/concerns   [] Advised patient/agent/surrogate to review completed ACP document and update if needed with changes in condition, patient preferences or care setting    [] This note routed to one or more involved healthcare providers

## 2025-02-17 NOTE — ED NOTES
Report given to REUBEN Giraldo from ED.   Report method in person   The following was reviewed with receiving RN:   Current vital signs:  /83   Pulse 84   Temp 98.2 °F (36.8 °C) (Oral)   Resp 21   Ht 1.575 m (5' 2\")   Wt 97.1 kg (214 lb)   LMP  (LMP Unknown)   SpO2 95%   BMI 39.14 kg/m²                      Any medication or safety alerts were reviewed. Any pending diagnostics and notifications were also reviewed, as well as any safety concerns or issues, abnormal labs, abnormal imaging, and abnormal assessment findings. Questions were answered.

## 2025-02-17 NOTE — H&P
[facility] Internal Medicine  Mejia Kelly MD; Clifford Albrecht MD, Adi Singh MD, Ivana Orellana MD,    Rose Buckley MD, Jhony Hoyos MD.    [facility] Internal Medicine   IN-PATIENT SERVICE   [facility]    HISTORY AND PHYSICAL EXAMINATION            Date:   2/17/2025  Patient name:  Mariangel Abreu  Date of admission:  2/16/2025  5:05 PM  MRN:   [id number]  Account:  [id number]  YOB: 1950  PCP:    Nuzhat Ramos DTR  Room:   2082/2082-01  Code Status:    Full Code    Chief Complaint:     Chief Complaint   Patient presents with    Fall    Bilateral Leg Weakness    Lightheadedness    Back Pain       History Obtained From:     Patient/EMR/Bedside RN    History of Present Illness:     Mariangel Abreu is a 74 y.o. Non- / non  female who presents with Fall, Bilateral Leg Weakness, Lightheadedness, and Back Pain   and is admitted to the hospital for the management of Syncope and collapse. Medical history significant for CAD s/p stent x4, HTN, HLD, hx of atrial fibrillation, diabetes mellitus type 2, COPD on home oxygen, chronic pain.     Presented to ED after syncopal episode at home. States she was ambulating to the restroom and then woke up on the floor. Is not aware of any symptoms prior to syncopal events. Syncope was unwitnessed, patient is unsure of exact length of being unconscious but states it was only for a brief period and then she called EMS for assistance. States that she has had syncopal episodes in the past but does not recall Reports pain in bilateral knees post fall.  CT head and cervical spine, no acute injury or fractures. Xray of bilateral knees, no acute fracture or injury. EKG normal sinus rhythm, prolonged Qtc 522, no ST or T-wave changes. Troponin 18-17. Denies chest pain. D-Dimer 0.72, PE ruled out bu years criteria. Denies fever, chills, chest pain, cough, abdominal pain, nausea, vomiting, diarrhea, and

## 2025-02-17 NOTE — ED NOTES
Report given to REUBEN Hough & REUBEN Richards from U.   Report method by phone   The following was reviewed with receiving RN:   Current vital signs:  BP (!) 113/91   Pulse 79   Temp 98.2 °F (36.8 °C) (Oral)   Resp 22   Ht 1.575 m (5' 2\")   Wt 97.1 kg (214 lb)   LMP  (LMP Unknown)   SpO2 95%   BMI 39.14 kg/m²                      Any medication or safety alerts were reviewed. Any pending diagnostics and notifications were also reviewed, as well as any safety concerns or issues, abnormal labs, abnormal imaging, and abnormal assessment findings. Questions were answered.

## 2025-02-17 NOTE — PLAN OF CARE
Problem: Chronic Conditions and Co-morbidities  Goal: Patient's chronic conditions and co-morbidity symptoms are monitored and maintained or improved  Outcome: Progressing     Problem: Discharge Planning  Goal: Discharge to home or other facility with appropriate resources  Outcome: Progressing     Problem: Pain  Goal: Verbalizes/displays adequate comfort level or baseline comfort level  Outcome: Progressing    Problem: Skin/Tissue Integrity  Goal: Skin integrity remains intact  Description: 1.  Monitor for areas of redness and/or skin breakdown  2.  Assess vascular access sites hourly  3.  Every 4-6 hours minimum:  Change oxygen saturation probe site  4.  Every 4-6 hours:  If on nasal continuous positive airway pressure, respiratory therapy assess nares and determine need for appliance change or resting period  Outcome: Progressing     Problem: ABCDS Injury Assessment  Goal: Absence of physical injury  Outcome: Progressing     Problem: Safety - Adult  Goal: Free from fall injury  Outcome: Progressing

## 2025-02-18 LAB
ALBUMIN SERPL-MCNC: 3.3 G/DL (ref 3.5–5.2)
ALP SERPL-CCNC: 62 U/L (ref 35–104)
ALT SERPL-CCNC: 13 U/L (ref 10–35)
ANION GAP SERPL CALCULATED.3IONS-SCNC: 11 MMOL/L (ref 9–16)
AST SERPL-CCNC: 18 U/L (ref 10–35)
BASOPHILS # BLD: 0.08 K/UL (ref 0–0.2)
BASOPHILS NFR BLD: 1 % (ref 0–2)
BILIRUB SERPL-MCNC: 0.2 MG/DL (ref 0–1.2)
BUN SERPL-MCNC: 28 MG/DL (ref 8–23)
CALCIUM SERPL-MCNC: 8.7 MG/DL (ref 8.6–10.4)
CHLORIDE SERPL-SCNC: 99 MMOL/L (ref 98–107)
CO2 SERPL-SCNC: 26 MMOL/L (ref 20–31)
CREAT SERPL-MCNC: 1.7 MG/DL (ref 0.7–1.2)
EKG ATRIAL RATE: 79 BPM
EKG P AXIS: 60 DEGREES
EKG P-R INTERVAL: 136 MS
EKG Q-T INTERVAL: 412 MS
EKG QRS DURATION: 72 MS
EKG QTC CALCULATION (BAZETT): 472 MS
EKG R AXIS: -41 DEGREES
EKG T AXIS: 89 DEGREES
EKG VENTRICULAR RATE: 79 BPM
EOSINOPHIL # BLD: 0.25 K/UL (ref 0–0.4)
EOSINOPHILS RELATIVE PERCENT: 3 % (ref 0–4)
ERYTHROCYTE [DISTWIDTH] IN BLOOD BY AUTOMATED COUNT: 14.4 % (ref 11.5–14.9)
GFR, ESTIMATED: 31 ML/MIN/1.73M2
GLUCOSE BLD-MCNC: 172 MG/DL (ref 65–105)
GLUCOSE BLD-MCNC: 180 MG/DL (ref 65–105)
GLUCOSE BLD-MCNC: 190 MG/DL (ref 65–105)
GLUCOSE BLD-MCNC: 253 MG/DL (ref 65–105)
GLUCOSE SERPL-MCNC: 162 MG/DL (ref 74–99)
HCT VFR BLD AUTO: 40.7 % (ref 36–46)
HGB BLD-MCNC: 13.2 G/DL (ref 12–16)
LYMPHOCYTES NFR BLD: 1.39 K/UL (ref 1–4.8)
LYMPHOCYTES RELATIVE PERCENT: 17 % (ref 24–44)
MCH RBC QN AUTO: 30 PG (ref 26–34)
MCHC RBC AUTO-ENTMCNC: 32.6 G/DL (ref 31–37)
MCV RBC AUTO: 92.1 FL (ref 80–100)
MONOCYTES NFR BLD: 0.82 K/UL (ref 0.1–1.3)
MONOCYTES NFR BLD: 10 % (ref 1–7)
MORPHOLOGY: NORMAL
NEUTROPHILS NFR BLD: 69 % (ref 36–66)
NEUTS SEG NFR BLD: 5.66 K/UL (ref 1.3–9.1)
PLATELET # BLD AUTO: 169 K/UL (ref 150–450)
PMV BLD AUTO: 10 FL (ref 6–12)
POTASSIUM SERPL-SCNC: 3.8 MMOL/L (ref 3.7–5.3)
PROT SERPL-MCNC: 6.7 G/DL (ref 6.6–8.7)
RBC # BLD AUTO: 4.41 M/UL (ref 4–5.2)
SODIUM SERPL-SCNC: 136 MMOL/L (ref 136–145)
WBC OTHER # BLD: 8.2 K/UL (ref 3.5–11)

## 2025-02-18 PROCEDURE — 6360000002 HC RX W HCPCS: Performed by: INTERNAL MEDICINE

## 2025-02-18 PROCEDURE — 94664 DEMO&/EVAL PT USE INHALER: CPT

## 2025-02-18 PROCEDURE — 99233 SBSQ HOSP IP/OBS HIGH 50: CPT | Performed by: NURSE PRACTITIONER

## 2025-02-18 PROCEDURE — 97110 THERAPEUTIC EXERCISES: CPT

## 2025-02-18 PROCEDURE — 82947 ASSAY GLUCOSE BLOOD QUANT: CPT

## 2025-02-18 PROCEDURE — 2580000003 HC RX 258: Performed by: INTERNAL MEDICINE

## 2025-02-18 PROCEDURE — 97530 THERAPEUTIC ACTIVITIES: CPT

## 2025-02-18 PROCEDURE — 6360000002 HC RX W HCPCS: Performed by: NURSE PRACTITIONER

## 2025-02-18 PROCEDURE — 6370000000 HC RX 637 (ALT 250 FOR IP): Performed by: INTERNAL MEDICINE

## 2025-02-18 PROCEDURE — 2700000000 HC OXYGEN THERAPY PER DAY

## 2025-02-18 PROCEDURE — 94760 N-INVAS EAR/PLS OXIMETRY 1: CPT

## 2025-02-18 PROCEDURE — 2500000003 HC RX 250 WO HCPCS: Performed by: NURSE PRACTITIONER

## 2025-02-18 PROCEDURE — 36415 COLL VENOUS BLD VENIPUNCTURE: CPT

## 2025-02-18 PROCEDURE — 85025 COMPLETE CBC W/AUTO DIFF WBC: CPT

## 2025-02-18 PROCEDURE — 94640 AIRWAY INHALATION TREATMENT: CPT

## 2025-02-18 PROCEDURE — 2060000000 HC ICU INTERMEDIATE R&B

## 2025-02-18 PROCEDURE — 80053 COMPREHEN METABOLIC PANEL: CPT

## 2025-02-18 PROCEDURE — 6370000000 HC RX 637 (ALT 250 FOR IP): Performed by: NURSE PRACTITIONER

## 2025-02-18 PROCEDURE — 99233 SBSQ HOSP IP/OBS HIGH 50: CPT | Performed by: INTERNAL MEDICINE

## 2025-02-18 PROCEDURE — 2500000003 HC RX 250 WO HCPCS: Performed by: INTERNAL MEDICINE

## 2025-02-18 RX ADMIN — INSULIN LISPRO 8 UNITS: 100 INJECTION, SOLUTION INTRAVENOUS; SUBCUTANEOUS at 12:10

## 2025-02-18 RX ADMIN — MICONAZOLE NITRATE: 20 POWDER TOPICAL at 07:56

## 2025-02-18 RX ADMIN — RANOLAZINE 500 MG: 500 TABLET, EXTENDED RELEASE ORAL at 21:51

## 2025-02-18 RX ADMIN — TICAGRELOR 90 MG: 90 TABLET ORAL at 07:54

## 2025-02-18 RX ADMIN — ESCITALOPRAM OXALATE 20 MG: 20 TABLET ORAL at 07:55

## 2025-02-18 RX ADMIN — INSULIN GLARGINE 45 UNITS: 100 INJECTION, SOLUTION SUBCUTANEOUS at 21:53

## 2025-02-18 RX ADMIN — ROSUVASTATIN 40 MG: 40 TABLET, FILM COATED ORAL at 21:52

## 2025-02-18 RX ADMIN — MICONAZOLE NITRATE: 20 POWDER TOPICAL at 21:51

## 2025-02-18 RX ADMIN — GABAPENTIN 100 MG: 100 CAPSULE ORAL at 21:51

## 2025-02-18 RX ADMIN — ENOXAPARIN SODIUM 40 MG: 100 INJECTION SUBCUTANEOUS at 07:55

## 2025-02-18 RX ADMIN — OXYBUTYNIN CHLORIDE 5 MG: 5 TABLET, EXTENDED RELEASE ORAL at 07:54

## 2025-02-18 RX ADMIN — TICAGRELOR 90 MG: 90 TABLET ORAL at 21:52

## 2025-02-18 RX ADMIN — INSULIN LISPRO 4 UNITS: 100 INJECTION, SOLUTION INTRAVENOUS; SUBCUTANEOUS at 17:14

## 2025-02-18 RX ADMIN — GABAPENTIN 100 MG: 100 CAPSULE ORAL at 07:55

## 2025-02-18 RX ADMIN — INSULIN GLARGINE 50 UNITS: 100 INJECTION, SOLUTION SUBCUTANEOUS at 07:54

## 2025-02-18 RX ADMIN — PANTOPRAZOLE SODIUM 40 MG: 40 TABLET, DELAYED RELEASE ORAL at 16:50

## 2025-02-18 RX ADMIN — METOCLOPRAMIDE 5 MG: 5 TABLET ORAL at 14:31

## 2025-02-18 RX ADMIN — MIDODRINE HYDROCHLORIDE 2.5 MG: 2.5 TABLET ORAL at 16:43

## 2025-02-18 RX ADMIN — WATER 1000 MG: 1 INJECTION INTRAMUSCULAR; INTRAVENOUS; SUBCUTANEOUS at 16:43

## 2025-02-18 RX ADMIN — SODIUM CHLORIDE: 9 INJECTION, SOLUTION INTRAVENOUS at 16:50

## 2025-02-18 RX ADMIN — BUDESONIDE AND FORMOTEROL FUMARATE DIHYDRATE 2 PUFF: 80; 4.5 AEROSOL RESPIRATORY (INHALATION) at 20:15

## 2025-02-18 RX ADMIN — PANTOPRAZOLE SODIUM 40 MG: 40 TABLET, DELAYED RELEASE ORAL at 05:28

## 2025-02-18 RX ADMIN — RANOLAZINE 500 MG: 500 TABLET, EXTENDED RELEASE ORAL at 07:54

## 2025-02-18 RX ADMIN — MIDODRINE HYDROCHLORIDE 2.5 MG: 2.5 TABLET ORAL at 07:55

## 2025-02-18 RX ADMIN — SODIUM CHLORIDE, PRESERVATIVE FREE 10 ML: 5 INJECTION INTRAVENOUS at 21:53

## 2025-02-18 RX ADMIN — BUDESONIDE AND FORMOTEROL FUMARATE DIHYDRATE 2 PUFF: 80; 4.5 AEROSOL RESPIRATORY (INHALATION) at 08:25

## 2025-02-18 RX ADMIN — SODIUM CHLORIDE: 9 INJECTION, SOLUTION INTRAVENOUS at 04:16

## 2025-02-18 RX ADMIN — MIDODRINE HYDROCHLORIDE 2.5 MG: 2.5 TABLET ORAL at 11:50

## 2025-02-18 RX ADMIN — METOPROLOL TARTRATE 12.5 MG: 25 TABLET, FILM COATED ORAL at 07:55

## 2025-02-18 RX ADMIN — Medication 10.5 MG: at 21:52

## 2025-02-18 RX ADMIN — ASPIRIN 81 MG: 81 TABLET, COATED ORAL at 07:54

## 2025-02-18 RX ADMIN — EMPAGLIFLOZIN 10 MG: 10 TABLET, FILM COATED ORAL at 07:55

## 2025-02-18 RX ADMIN — INSULIN LISPRO 4 UNITS: 100 INJECTION, SOLUTION INTRAVENOUS; SUBCUTANEOUS at 21:52

## 2025-02-18 RX ADMIN — METOPROLOL TARTRATE 12.5 MG: 25 TABLET, FILM COATED ORAL at 21:52

## 2025-02-18 RX ADMIN — ALOGLIPTIN 12.5 MG: 12.5 TABLET, FILM COATED ORAL at 07:54

## 2025-02-18 RX ADMIN — TOPIRAMATE 100 MG: 100 TABLET, FILM COATED ORAL at 22:09

## 2025-02-18 ASSESSMENT — PAIN SCALES - WONG BAKER: WONGBAKER_NUMERICALRESPONSE: NO HURT

## 2025-02-18 NOTE — FLOWSHEET NOTE
02/17/25 1956   Treatment Team Notification   Reason for Communication Review case   Name of Team Member Notified Jessica Brian NP   Treatment Team Role Advanced Practice Nurse   Method of Communication Secure Message     \"BP running on the lower side. Pt here for syncope & collapse. Current /54. Would you still like her to receive nightly dose of PO metoprolol 12.5 mg?    Also - her glucose has been variable throughout the day. At lunch it was 410 and at dinner 101. Just checked her glucose and it is 113. Would you like any insulin coverage for tonight?    Please advise - thank you\"    2001 - \"The metoprolol is fine to give as long as her HR is greater than 70. You can hold her lantus dosing for tonight, but complete a 0000 glucose check, so if she’s rising we can administer at a later time \"

## 2025-02-18 NOTE — PLAN OF CARE
Problem: Chronic Conditions and Co-morbidities  Goal: Patient's chronic conditions and co-morbidity symptoms are monitored and maintained or improved  2/18/2025 0330 by Duane Pablo RN  Outcome: Progressing     Problem: Discharge Planning  Goal: Discharge to home or other facility with appropriate resources  2/18/2025 0330 by Duane Pablo RN  Outcome: Progressing     Problem: Pain  Goal: Verbalizes/displays adequate comfort level or baseline comfort level  2/18/2025 0330 by Duane Pablo RN  Outcome: Progressing  Note: Assessed all pain characteristics including level, type, location, frequency, and onset.  Non-pharmacologic interventions offered to pt as well.  Pt states pain is tolerable at this time.        Problem: Skin/Tissue Integrity  Goal: Skin integrity remains intact  Description: 1.  Monitor for areas of redness and/or skin breakdown  2.  Assess vascular access sites hourly  3.  Every 4-6 hours minimum:  Change oxygen saturation probe site  4.  Every 4-6 hours:  If on nasal continuous positive airway pressure, respiratory therapy assess nares and determine need for appliance change or resting period  2/18/2025 0330 by Duane Pablo RN  Outcome: Progressing     Problem: ABCDS Injury Assessment  Goal: Absence of physical injury  Outcome: Progressing     Problem: Safety - Adult  Goal: Free from fall injury  2/18/2025 0330 by Duane Pablo RN  Outcome: Progressing  Note: Pt assessed as a fall risk this shift. Remains free from falls and accidental injury at this time. Fall precautions in place, including falling star sign and fall risk band on pt. Floor free from obstacles, and bed is locked and in lowest position. Adequate lighting provided.  Pt encouraged to call before getting OOB for any need.  Bed alarm activated. Will continue to monitor needs during hourly rounding, and reinforce education on use of call light.

## 2025-02-18 NOTE — CONSULTS
Constitutional and General Appearance: alert, cooperative, no distress and appears stated age  HEENT: PERRL, no cervical lymphadenopathy. No masses palpable. Normal oral mucosa  Respiratory:  Normal excursion and expansion without use of accessory muscles  Resp Auscultation: Good respiratory effort. No for increased work of breathing. On auscultation: clear to auscultation bilaterally  Cardiovascular:  Heart tones are crisp and normal. regular S1 and S2.  Jugular venous pulsation Normal  The carotid upstroke is normal in amplitude and contour without delay or bruit   Abdomen:   soft  Bowel sounds present  Obese  Extremities:   No edema  Neurological:  Alert and oriented.      DATA:    Diagnostics:    EKG: SR, SA, left axis deviation, unchanged from previous tracings.  ECHO: obtained and reviewed.   Ejection fraction: 55%  Stress Test: obtained and reviewed.  Cardiac Angiography: obtained and reviewed.    [facility] 10/2024   Left Ventricle: Normal left ventricular systolic function with a visually estimated EF of 55 - 60%. EF by 2D Simpsons Biplane is 51%. Left ventricle size is normal. Increased wall thickness. Findings consistent with mild concentric hypertrophy. Normal wall motion. Normal diastolic function.    Aortic Valve: Trileaflet valve.    Image quality is adequate. Technically difficult study due to patient's body habitus.    Stress Test 1/2025  IMPRESSION:     [Small reversible apical anterior wall perfusion defect probably ischemia.     Fixed inferior wall perfusion defect in the basal segment representing diaphragmatic attenuation artifact or infarct.]     Risk stratification: [Low]    Cardiac Cath 3/2024  Findings:        LAD: 80% long proximal stenosis heavily calcified, length is 34 mm, GELACIO-3 flow, underwent shockwave with 2.5 mm balloon followed by stenting with 3.0 x 38 mm Syed stent with 0% residual stenosis and GELACIO-3 flow     Mid LAD has 80% stenosis length is 8 mm, GELACIO-3 flow, underwent ELEANOR using 
Mother     Heart Disease Mother     Cancer Father     Stomach Cancer Father     Emphysema Sister     Diabetes Maternal Grandmother         also aunts and uncles (maternal)    Breast Cancer Maternal Aunt            REVIEW OF SYSTEMS     12 point review of system obtained at the bedside with positive findings covered above all others are negative at this time     OBJECTIVE      Vitals:    02/18/25 0825   BP:    Pulse: 74   Resp: 18   Temp:    SpO2: 95%     Wt Readings from Last 3 Encounters:   02/16/25 97.1 kg (214 lb)   01/17/25 95.7 kg (211 lb)   01/11/25 95.7 kg (211 lb)     I/O last 3 completed shifts:  In: 760 [P.O.:760]  Out: 280 [Urine:280]  Body mass index is 39.14 kg/m².     PHYSICAL EXAM      GENERAL APPEARANCE:Awake, alert, in no acute distress  SKIN: warm and dry, no rash or erythema  EYES: conjunctivae normal and sclera anicteric  NECK:  JVD Absent.   PULMONARY: Symmetrical and CTA BL.  CADRDIOVASCULAR: S1 and S2 audible.   ABDOMEN: soft nontender, bowel sounds present.  EXTREMITIES: no cyanosis, clubbing or edema  Neurology: Face symmetrical, no tremors, nonfocal motor exam    LABS      Data:    CBC:   Recent Labs     02/16/25  1730 02/17/25  0645 02/18/25  0600   WBC 11.5* 8.4 8.2   HGB 15.0 14.3 13.2   HCT 46.9* 43.5 40.7   MCV 92.0 88.7 92.1    263 169     BMP:    Recent Labs     02/16/25  1730 02/17/25  0645 02/18/25  0600   * 132* 136   K 4.5 4.0 3.8   CL 93* 93* 99   CO2 25 26 26   BUN 18 21 28*   CREATININE 1.2 1.1 1.7*   GLUCOSE 386* 300* 162*     CMP:   Recent Labs     02/16/25  1730 02/17/25  0645 02/18/25  0600   * 132* 136   K 4.5 4.0 3.8   CL 93* 93* 99   CO2 25 26 26   BUN 18 21 28*   CREATININE 1.2 1.1 1.7*   GLUCOSE 386* 300* 162*   CALCIUM 9.3 9.1 8.7   BILITOT 0.7  --  0.2   ALKPHOS 80  --  62   AST 24  --  18   ALT 14  --  13       Hi Matias MD MD  [facility].  Ph: [phone]

## 2025-02-18 NOTE — PLAN OF CARE
Problem: Chronic Conditions and Co-morbidities  Goal: Patient's chronic conditions and co-morbidity symptoms are monitored and maintained or improved  2/18/2025 1547 by Greer Chiu RN  Outcome: Progressing  Flowsheets (Taken 2/18/2025 1547)  Care Plan - Patient's Chronic Conditions and Co-Morbidity Symptoms are Monitored and Maintained or Improved: Monitor and assess patient's chronic conditions and comorbid symptoms for stability, deterioration, or improvement  2/18/2025 0330 by Duane Pablo RN  Outcome: Progressing     Problem: Discharge Planning  Goal: Discharge to home or other facility with appropriate resources  2/18/2025 0330 by Duane Pablo RN  Outcome: Progressing     Problem: Pain  Goal: Verbalizes/displays adequate comfort level or baseline comfort level  2/18/2025 0330 by Duane Pablo RN  Outcome: Progressing  Note: Assessed all pain characteristics including level, type, location, frequency, and onset.  Non-pharmacologic interventions offered to pt as well.  Pt states pain is tolerable at this time.        Problem: Skin/Tissue Integrity  Goal: Skin integrity remains intact  Description: 1.  Monitor for areas of redness and/or skin breakdown  2.  Assess vascular access sites hourly  3.  Every 4-6 hours minimum:  Change oxygen saturation probe site  4.  Every 4-6 hours:  If on nasal continuous positive airway pressure, respiratory therapy assess nares and determine need for appliance change or resting period  2/18/2025 0330 by Duane Pablo RN  Outcome: Progressing     Problem: ABCDS Injury Assessment  Goal: Absence of physical injury  2/18/2025 0330 by Duane Pablo RN  Outcome: Progressing     Problem: Safety - Adult  Goal: Free from fall injury  2/18/2025 1547 by Greer Chiu, RN  Outcome: Progressing  Note: Bed in lowest position.  Call light in reach.  Side rails x 2.  Bed alarm on.  2/18/2025 0330 by Duane Pablo RN  Outcome: Progressing  Note: Pt assessed as a fall risk this shift. Remains free from falls

## 2025-02-19 VITALS
RESPIRATION RATE: 18 BRPM | BODY MASS INDEX: 39.38 KG/M2 | HEIGHT: 62 IN | HEART RATE: 75 BPM | DIASTOLIC BLOOD PRESSURE: 87 MMHG | TEMPERATURE: 98.1 F | SYSTOLIC BLOOD PRESSURE: 138 MMHG | WEIGHT: 214 LBS | OXYGEN SATURATION: 96 %

## 2025-02-19 LAB
ALBUMIN SERPL-MCNC: 3.3 G/DL (ref 3.5–5.2)
ALP SERPL-CCNC: 57 U/L (ref 35–104)
ALT SERPL-CCNC: 11 U/L (ref 10–35)
ANION GAP SERPL CALCULATED.3IONS-SCNC: 11 MMOL/L (ref 9–16)
AST SERPL-CCNC: 15 U/L (ref 10–35)
BASOPHILS # BLD: 0 K/UL (ref 0–0.2)
BASOPHILS NFR BLD: 1 % (ref 0–2)
BILIRUB SERPL-MCNC: <0.2 MG/DL (ref 0–1.2)
BUN SERPL-MCNC: 25 MG/DL (ref 8–23)
CA-I BLD-SCNC: 1.16 MMOL/L (ref 1.13–1.33)
CALCIUM SERPL-MCNC: 8.8 MG/DL (ref 8.6–10.4)
CHLORIDE SERPL-SCNC: 103 MMOL/L (ref 98–107)
CO2 SERPL-SCNC: 26 MMOL/L (ref 20–31)
CREAT SERPL-MCNC: 1.3 MG/DL (ref 0.7–1.2)
EOSINOPHIL # BLD: 0.3 K/UL (ref 0–0.4)
EOSINOPHILS RELATIVE PERCENT: 4 % (ref 0–4)
ERYTHROCYTE [DISTWIDTH] IN BLOOD BY AUTOMATED COUNT: 13.6 % (ref 11.5–14.9)
GFR, ESTIMATED: 43 ML/MIN/1.73M2
GLUCOSE BLD-MCNC: 127 MG/DL (ref 65–105)
GLUCOSE BLD-MCNC: 170 MG/DL (ref 65–105)
GLUCOSE BLD-MCNC: 246 MG/DL (ref 65–105)
GLUCOSE SERPL-MCNC: 132 MG/DL (ref 74–99)
HCT VFR BLD AUTO: 40.2 % (ref 36–46)
HGB BLD-MCNC: 13.3 G/DL (ref 12–16)
LYMPHOCYTES NFR BLD: 1.3 K/UL (ref 1–4.8)
LYMPHOCYTES RELATIVE PERCENT: 19 % (ref 24–44)
MCH RBC QN AUTO: 29.9 PG (ref 26–34)
MCHC RBC AUTO-ENTMCNC: 33.1 G/DL (ref 31–37)
MCV RBC AUTO: 90.5 FL (ref 80–100)
MONOCYTES NFR BLD: 0.7 K/UL (ref 0.1–1.3)
MONOCYTES NFR BLD: 11 % (ref 1–7)
NEUTROPHILS NFR BLD: 65 % (ref 36–66)
NEUTS SEG NFR BLD: 4.6 K/UL (ref 1.3–9.1)
PLATELET # BLD AUTO: 152 K/UL (ref 150–450)
PMV BLD AUTO: 9.6 FL (ref 6–12)
POTASSIUM SERPL-SCNC: 4.2 MMOL/L (ref 3.7–5.3)
PROT SERPL-MCNC: 6.5 G/DL (ref 6.6–8.7)
PTH-INTACT SERPL-MCNC: 50 PG/ML (ref 15–65)
RBC # BLD AUTO: 4.44 M/UL (ref 4–5.2)
SODIUM SERPL-SCNC: 140 MMOL/L (ref 136–145)
WBC OTHER # BLD: 6.9 K/UL (ref 3.5–11)

## 2025-02-19 PROCEDURE — 6370000000 HC RX 637 (ALT 250 FOR IP): Performed by: NURSE PRACTITIONER

## 2025-02-19 PROCEDURE — 85025 COMPLETE CBC W/AUTO DIFF WBC: CPT

## 2025-02-19 PROCEDURE — 6360000002 HC RX W HCPCS: Performed by: NURSE PRACTITIONER

## 2025-02-19 PROCEDURE — 94640 AIRWAY INHALATION TREATMENT: CPT

## 2025-02-19 PROCEDURE — 6370000000 HC RX 637 (ALT 250 FOR IP): Performed by: INTERNAL MEDICINE

## 2025-02-19 PROCEDURE — 2580000003 HC RX 258: Performed by: INTERNAL MEDICINE

## 2025-02-19 PROCEDURE — 82947 ASSAY GLUCOSE BLOOD QUANT: CPT

## 2025-02-19 PROCEDURE — 2700000000 HC OXYGEN THERAPY PER DAY

## 2025-02-19 PROCEDURE — 99233 SBSQ HOSP IP/OBS HIGH 50: CPT | Performed by: NURSE PRACTITIONER

## 2025-02-19 PROCEDURE — 36415 COLL VENOUS BLD VENIPUNCTURE: CPT

## 2025-02-19 PROCEDURE — 83970 ASSAY OF PARATHORMONE: CPT

## 2025-02-19 PROCEDURE — 80053 COMPREHEN METABOLIC PANEL: CPT

## 2025-02-19 PROCEDURE — 94761 N-INVAS EAR/PLS OXIMETRY MLT: CPT

## 2025-02-19 PROCEDURE — 82330 ASSAY OF CALCIUM: CPT

## 2025-02-19 RX ORDER — OXYCODONE AND ACETAMINOPHEN 5; 325 MG/1; MG/1
1 TABLET ORAL EVERY 8 HOURS PRN
Qty: 9 TABLET | Refills: 0 | Status: SHIPPED | OUTPATIENT
Start: 2025-02-19 | End: 2025-02-22

## 2025-02-19 RX ORDER — METOPROLOL TARTRATE 25 MG/1
12.5 TABLET, FILM COATED ORAL 2 TIMES DAILY
Qty: 60 TABLET | Refills: 3 | Status: SHIPPED | OUTPATIENT
Start: 2025-02-19

## 2025-02-19 RX ADMIN — METOPROLOL TARTRATE 12.5 MG: 25 TABLET, FILM COATED ORAL at 08:37

## 2025-02-19 RX ADMIN — ENOXAPARIN SODIUM 40 MG: 100 INJECTION SUBCUTANEOUS at 08:38

## 2025-02-19 RX ADMIN — SODIUM CHLORIDE: 9 INJECTION, SOLUTION INTRAVENOUS at 05:25

## 2025-02-19 RX ADMIN — MICONAZOLE NITRATE: 20 POWDER TOPICAL at 08:39

## 2025-02-19 RX ADMIN — BUDESONIDE AND FORMOTEROL FUMARATE DIHYDRATE 2 PUFF: 80; 4.5 AEROSOL RESPIRATORY (INHALATION) at 08:58

## 2025-02-19 RX ADMIN — GABAPENTIN 100 MG: 100 CAPSULE ORAL at 08:37

## 2025-02-19 RX ADMIN — OXYBUTYNIN CHLORIDE 5 MG: 5 TABLET, EXTENDED RELEASE ORAL at 08:38

## 2025-02-19 RX ADMIN — TICAGRELOR 90 MG: 90 TABLET ORAL at 08:37

## 2025-02-19 RX ADMIN — RANOLAZINE 500 MG: 500 TABLET, EXTENDED RELEASE ORAL at 08:37

## 2025-02-19 RX ADMIN — PANTOPRAZOLE SODIUM 40 MG: 40 TABLET, DELAYED RELEASE ORAL at 05:04

## 2025-02-19 RX ADMIN — INSULIN LISPRO 4 UNITS: 100 INJECTION, SOLUTION INTRAVENOUS; SUBCUTANEOUS at 11:36

## 2025-02-19 RX ADMIN — INSULIN GLARGINE 50 UNITS: 100 INJECTION, SOLUTION SUBCUTANEOUS at 08:44

## 2025-02-19 RX ADMIN — ESCITALOPRAM OXALATE 20 MG: 20 TABLET ORAL at 08:37

## 2025-02-19 RX ADMIN — ASPIRIN 81 MG: 81 TABLET, COATED ORAL at 08:37

## 2025-02-19 NOTE — PLAN OF CARE
Problem: Chronic Conditions and Co-morbidities  Goal: Patient's chronic conditions and co-morbidity symptoms are monitored and maintained or improved  2/19/2025 1540 by Nicolle Erazo RN  Outcome: Completed  2/19/2025 0311 by Prabha Beatty RN  Outcome: Progressing     Problem: Discharge Planning  Goal: Discharge to home or other facility with appropriate resources  2/19/2025 1540 by Nicolle Erazo RN  Outcome: Completed  2/19/2025 0311 by Prabha Beatty RN  Outcome: Progressing     Problem: Pain  Goal: Verbalizes/displays adequate comfort level or baseline comfort level  2/19/2025 1540 by Nicolle Erazo RN  Outcome: Completed  2/19/2025 0311 by Prabha Beatty RN  Outcome: Progressing     Problem: Skin/Tissue Integrity  Goal: Skin integrity remains intact  2/19/2025 1540 by Nicolle Erazo RN  Outcome: Completed  2/19/2025 0311 by Prabha Beatty RN  Outcome: Progressing     Problem: ABCDS Injury Assessment  Goal: Absence of physical injury  2/19/2025 1540 by Nicolle Erazo RN  Outcome: Completed  2/19/2025 0311 by Prabha Beatty RN  Outcome: Progressing     Problem: Safety - Adult  Goal: Free from fall injury  2/19/2025 1540 by Nicolle Erazo RN  Outcome: Completed  2/19/2025 0311 by Prabha Beatty RN  Outcome: Progressing

## 2025-02-19 NOTE — CARE COORDINATION
Case Management Assessment  Initial Evaluation    Date/Time of Evaluation: 2/17/2025 9:20AM  Assessment Completed by: Yuni Yao RN    If patient is discharged prior to next notation, then this note serves as note for discharge by case management.    Patient Name: Mariangel Abreu                   YOB: 1950  Diagnosis: Syncope and collapse [R55]                   Date / Time: 2/16/2025  5:05 PM    Patient Admission Status: Inpatient   Readmission Risk (Low < 19, Mod (19-27), High > 27): Readmission Risk Score: 22.9    Current PCP: Nuzhat Ramos DTR  PCP verified by CM? Yes    Chart Reviewed: Yes      History Provided by: Patient  Patient Orientation: Alert and Oriented    Patient Cognition: Alert    Hospitalization in the last 30 days (Readmission):  No    If yes, Readmission Assessment in CM Navigator will be completed.    Advance Directives:      Code Status: Full Code   Patient's Primary Decision Maker is: Legal Next of Kin    Primary Decision Maker (Active): David Waller - Other Relative - [phone]    Discharge Planning:    Patient lives with: Alone Type of Home: Apartment  Primary Care Giver: Self  Patient Support Systems include: Family Members   Current Financial resources: Medicare, Medicaid  Current community resources: ECF/Home Care  Current services prior to admission: C-pap, Oxygen Therapy, Durable Medical Equipment, Home Care            Current DME: Walker, Home Aerosol, Glucometer, Shower Chair            Type of Home Care services:  PT, OT, Nursing Services    ADLS  Prior functional level: Assistance with the following:, Mobility, Shopping, Housework  Current functional level: Housework, Shopping, Mobility, Assistance with the following:    PT AM-PAC:   /24  OT AM-PAC:   /24    Family can provide assistance at DC: Yes  Would you like Case Management to discuss the discharge plan with any other family members/significant others, and if so, who? No  Plans to Return to 
DISCHARGE PLANNING NOTE:    Notified by [facility] that they can accept the patient. At this time insurance authorization submitted through [employer] for approval.    Auth approved: Certificate #[id number].    Auth approval good from 2/19/25 through 2/25/25.    Haleigh Garcia updated.     Electronically signed by Karolyn Porter RN on 2/18/2025 at 11:36 AM    
DISCHARGE PLANNING NOTE:    This writer updated the patient that [facility] accepted and that insurance authorization has been approved. Once the patient is medically ready for discharge we will set her up for transportation.    Electronically signed by Karolyn Porter RN on 2/18/2025 at 2:20 PM    
DISCHARGE PLANNING NOTE:    Transportation arranged for patient to go to [facility] at [facility] via [facility] by wheelchair. Facility informed. Bedside nurse informed.     Number for Report: [phone]  Electronically signed by ADOLFO Piña on 2/19/2025 at 2:39 PM      
DISCHARGE PLANNING NOTE:    Writer is following for potential discharge to [facility]. Authorization has been approved 2/19-2/25. Pt can admit when medically ready.    Electronically signed by ADOLFO Piña on 2/19/2025 at 8:34 AM    
DISCHARGE PLANNING NOTE:    Writer met with pt for update on potential discharge and insurance approval for [facility]. No further questions at this time.    IMM letter provided to patient.  Patient offered four hours to make informed decision regarding appeal process; patient agreeable to discharge.     Electronically signed by ADOLFO Piña on 2/19/2025 at 11:40 AM    
DISCHARGE PLANNING NOTE:    Writer met with pt for update on transportation time. Pt declines writer to call family at this time.    HENS COMPLETE  Document ID : [id number]  Electronically signed by ADOLFO Piña on 2/19/2025 at 2:45 PM      
ONGOING DISCHARGE PLAN:    Patient is alert and oriented x4.    Spoke with patient regarding discharge plan and updated patient that PT/OT are recommending skilled nursing facility. At this time the patient is agreeable and states that she would like a referral sent to [facility] as she has been there in the past and she lives nearby. Referral faxed.    FOC list provided in the event that no beds available.                       Post Acute Facility/Agency List     Provided patient with the following list, the list includes the overall star ratings obtained from CMS per the Medicare Web site (www.Medicare.gov):     [] Long Term Acute Care Facilities  [] Acute Inpatient Rehabilitation Facilities  [x] Skilled Nursing Facilities  [] Hospice Facilities  [] Home Care    Provided verbal instructions on how to utilize the QR Code to obtain additional detailed star ratings from www.Medicare.gov     offered to print and provide the detailed list:    [x]Accepted   []Declined    The following printed detailed lists were provided:     Skilled Nursing Facilities.    IV rocephin-UTI.     Will continue to follow for additional discharge needs.    If patient is discharged prior to next notation, then this note serves as note for discharge by case management.    Electronically signed by Karolyn Porter RN on 2/18/2025 at 9:10 AM     
angioplasty implant and graft Z95.5    Personal history of urinary calculi Z87.442    Weakness R53.1    History of ST elevation myocardial infarction (STEMI) I25.2    Coronary artery disease involving native coronary artery of native heart without angina pectoris I25.10    Secondary hypercoagulable state ([facility]) D68.69    Familial hypercholesterolemia E78.01    Claudication ([facility]) I73.9    Type 2 diabetes mellitus with hyperglycemia ([facility]) E11.65    Hyperglycemia R73.9    CAD S/P percutaneous coronary angioplasty I25.10, Z98.61    TIA (transient ischemic attack) G45.9    Facial pain, acute R51.9    Type 2 diabetes mellitus with hyperglycemia, with long-term current use of insulin ([facility]) E11.65, Z79.4    Uncontrolled blood glucose R73.09    Elevated erythrocyte sedimentation rate R70.0    Elevated C-reactive protein (CRP) R79.82    Chest pain R07.9    Sprain of temporomandibular joint or ligament S03.40XA    Neck pain M54.2    Angina at rest ([facility]) I20.89    Syncope and collapse R55       Isolation/Infection:   Isolation            No Isolation          Patient Infection Status       None to display                     Nurse Assessment:  Last Vital Signs: /61   Pulse 66   Temp 97.6 °F (36.4 °C) (Oral)   Resp 18   Ht 1.575 m (5' 2\")   Wt 97.1 kg (214 lb)   LMP  (LMP Unknown)   SpO2 98%   BMI 39.14 kg/m²     Last documented pain score (0-10 scale): Pain Level: 8  Last Weight:   Wt Readings from Last 1 Encounters:   02/16/25 97.1 kg (214 lb)     Mental Status:  oriented and alert    IV Access:  - None    Nursing Mobility/ADLs:  Walking   Independent: with walker  Transfer  Assisted  Bathing  Assisted  Dressing  Assisted  Toileting  Assisted  Feeding  Independent  Med Admin  Independent  Med Delivery   whole    Wound Care Documentation and Therapy:        Elimination:  Continence:   Bowel: Yes  Bladder: Yes  Urinary Catheter: None   Colostomy/Ileostomy/Ileal Conduit: No       Date of Last BM:

## 2025-02-19 NOTE — PROGRESS NOTES
Physician Progress Note      PATIENT:               NO BARLOW  [facility] #:                  [id number]  :                       1950  ADMIT DATE:       2025 5:05 PM  DISCH DATE:  RESPONDING  PROVIDER #:        Rose Buckley MD          QUERY TEXT:    Patient admitted with syncope and collapse. noted to have Orthostatic, Type 2   diabetes mellitus with hyperosmolar hyperglycemic state, Hyponatremia,   Dehydration, YAJAIRA. If possible, please document in progress notes and discharge   summary after study the etiology of the syncope:    The medical record reflects the following:  Risk Factors: Age 74 yrs, HTN, Diabetes mellitus type 2,  Clinical Indicators: H&P - Pt Presented to ED after syncopal episode at   home. States she was ambulating to the restroom and then woke up on the floor.   Is not aware of any symptoms prior to syncopal events. Syncope was   unwitnessed, patient is unsure of exact length of being unconscious but states   it was only for a brief period and then she called EMS for assistance    Check orthostatic, patient on Imdur, metoprolol, amlodipine, will check   orthostatic,    Cardiology Consults - Fall appears more mechanical vs syncope poss   secondary to dehydration.  Pt. States she has been nauseated and unable to eat   for several days - consider GI consult per primary.    Nephrology Consultant - Acute kidney injury secondary to   hemodynamic/vasomotor etiology.  CKD stage III A  Hyponatremia with component of pseudohyponatremia secondary to hyperglycemia -   improving    IM Progress Notes - Continue with telemetry at this time, check   orthostatic, patient on Imdur, metoprolol, amlodipine, will check orthostatic.     Type 2 diabetes, high sugars, sliding scale, increased to full dose,   continue home medication.    Orthostats were severely positive, systolic dropped below 90, started on IV   fluids, improved, patient was not eating drinking for last couple 
  [facility]  Progress Note                   Date:   2/18/2025  Patient name: Mariangel Abreu  Date of admission:  2/16/2025  5:05 PM  MRN:   [id number]  YOB: 1950  PCP: Nuzhat aRmos DTR    Reason for Admission: Syncope and collapse [R55]    Subjective:       Clinical Changes /Abnormalities:Seen & examined in room. No acute CV issues/concerns overnight. Labs, vitals, & tele reviewed.     Review of Systems    Medications:   Scheduled Meds:   insulin lispro  0-16 Units SubCUTAneous 4x Daily AC & HS    cefTRIAXone (ROCEPHIN) IV  1,000 mg IntraVENous Q24H    alogliptin  12.5 mg Oral Daily    [Held by provider] amLODIPine  2.5 mg Oral Daily    aspirin  81 mg Oral Daily    empagliflozin  10 mg Oral Daily    escitalopram  20 mg Oral Daily    budesonide-formoterol  2 puff Inhalation BID RT    gabapentin  100 mg Oral BID    insulin glargine  50 Units SubCUTAneous QAM    And    insulin glargine  45 Units SubCUTAneous Nightly    [Held by provider] isosorbide mononitrate  60 mg Oral Daily    metoprolol tartrate  12.5 mg Oral BID    midodrine  2.5 mg Oral TID WC    miconazole   Topical BID    oxyBUTYnin  5 mg Oral Daily    pantoprazole  40 mg Oral BID AC    ranolazine  500 mg Oral BID    rosuvastatin  40 mg Oral Nightly    ticagrelor  90 mg Oral BID    topiramate  100 mg Oral Daily    sodium chloride flush  5-40 mL IntraVENous 2 times per day    enoxaparin  40 mg SubCUTAneous Daily     Continuous Infusions:   sodium chloride 75 mL/hr at 02/18/25 0416    dextrose      sodium chloride       CBC:   Recent Labs     02/16/25  1730 02/17/25  0645 02/18/25  0600   WBC 11.5* 8.4 8.2   HGB 15.0 14.3 13.2    263 169     BMP:    Recent Labs     02/16/25  1730 02/17/25  0645 02/18/25  0600   * 132* 136   K 4.5 4.0 3.8   CL 93* 93* 99   CO2 25 26 26   BUN 18 21 28*   CREATININE 1.2 1.1 1.7*   GLUCOSE 386* 300* 162*     Hepatic:  Recent Labs     02/16/25  1730 02/18/25  0600   AST 24 18 
  [facility]  Progress Note                   Date:   2/19/2025  Patient name: Mariangel Abreu  Date of admission:  2/16/2025  5:05 PM  MRN:   [id number]  YOB: 1950  PCP: Nuzhat Ramos DTR    Reason for Admission: Syncope and collapse [R55]    Subjective:       Clinical Changes /Abnormalities: Pt seen and examined in the room.  Patient resting in bed. Pt reports chronic left sided chest pain. Labs, vitals and tele reviewed- SR 67    Medications:   Scheduled Meds:   insulin lispro  0-16 Units SubCUTAneous 4x Daily AC & HS    cefTRIAXone (ROCEPHIN) IV  1,000 mg IntraVENous Q24H    alogliptin  12.5 mg Oral Daily    [Held by provider] amLODIPine  2.5 mg Oral Daily    aspirin  81 mg Oral Daily    [Held by provider] empagliflozin  10 mg Oral Daily    escitalopram  20 mg Oral Daily    budesonide-formoterol  2 puff Inhalation BID RT    gabapentin  100 mg Oral BID    insulin glargine  50 Units SubCUTAneous QAM    And    insulin glargine  45 Units SubCUTAneous Nightly    [Held by provider] isosorbide mononitrate  60 mg Oral Daily    metoprolol tartrate  12.5 mg Oral BID    midodrine  2.5 mg Oral TID WC    miconazole   Topical BID    oxyBUTYnin  5 mg Oral Daily    pantoprazole  40 mg Oral BID AC    ranolazine  500 mg Oral BID    rosuvastatin  40 mg Oral Nightly    ticagrelor  90 mg Oral BID    topiramate  100 mg Oral Daily    sodium chloride flush  5-40 mL IntraVENous 2 times per day    enoxaparin  40 mg SubCUTAneous Daily     Continuous Infusions:   sodium chloride 75 mL/hr at 02/19/25 0525    dextrose      sodium chloride       CBC:   Recent Labs     02/17/25  0645 02/18/25  0600 02/19/25  0627   WBC 8.4 8.2 6.9   HGB 14.3 13.2 13.3    169 152     BMP:    Recent Labs     02/17/25  0645 02/18/25  0600 02/19/25  0627   * 136 140   K 4.0 3.8 4.2   CL 93* 99 103   CO2 26 26 26   BUN 21 28* 25*   CREATININE 1.1 1.7* 1.3*   GLUCOSE 300* 162* 132*     Hepatic:  Recent Labs     
Called and left voicemail for Dr. Matias (nephro) regarding new consult. Left Greer RN extension for him to  return call  
Dr. Jl Pereira (Cardiology) notified of new consult.  
[facility]   Physical Therapy Treatment  Date: 25  Patient Name: Mariangel Abreu       Room: -  MRN: [id number]  Account: [id number]   : 1950  (74 y.o.) Gender: female     Discharge Recommendations:  Discharge Recommendations: Continue to assess pending progress     PT D/C Equipment  Equipment Needed: No    General  Chart Reviewed: Yes  Patient assessed for rehabilitation services?: Yes  Additional Pertinent Hx: Mariangel Abreu is a 74 y.o. female who presents with Fall, Bilateral Leg Weakness, Lightheadedness, and Back Pain   and is admitted to the hospital for the management of Syncope and collapse. Medical history significant for CAD s/p stent x4, HTN, HLD, hx of atrial fibrillation, diabetes mellitus type 2, COPD on home oxygen, chronic pain.     Presented to ED after syncopal episode at home. States she was ambulating to the restroom and then woke up on the floor. Is not aware of any symptoms prior to syncopal events. Syncope was unwitnessed, patient is unsure of exact length of being unconscious but states it was only for a brief period and then she called EMS for assistance. States that she has had syncopal episodes in the past but does not recall Reports pain in bilateral knees post fall.  CT head and cervical spine, no acute injury or fractures. Xray of bilateral knees, no acute fracture or injury.  Response To Previous Treatment: Not applicable  Family/Caregiver Present: No  Referring Practitioner: Ana Maria Link APRN - NP  Referral Date : 25  Diagnosis: Syncope and collapse  Follows Commands: Within Functional Limits    Past Medical History:  has a past medical history of Abdominal bloating, Adenomatous polyp of ascending colon, Allergic rhinitis, Anxiety, Arthritis, Asthma, Atrial fibrillation (HCC), Back pain, Benign hypertension with CKD (chronic kidney disease) stage III (HCC), CAD (coronary artery disease), Caffeine use, CHF (congestive 
[facility]   Occupational Therapy Evaluation  Date: 25  Patient Name: Mariangel Abreu       Room:   MRN: [id number]  Account: [id number]   : 1950  (74 y.o.) Gender: female     Discharge Recommendations:  Further Occupational Therapy is recommended upon facility discharge.      OT Equipment Recommendations  Other: CTA    Referring Practitioner: Ana Maria Link APRN - NP  Diagnosis: syncope and collapse        Treatment Diagnosis: impaired self-care status    Past Medical History:  has a past medical history of Abdominal bloating, Adenomatous polyp of ascending colon, Allergic rhinitis, Anxiety, Arthritis, Asthma, Atrial fibrillation ([facility]), Back pain, Benign hypertension with CKD (chronic kidney disease) stage III ([facility]), CAD (coronary artery disease), Caffeine use, CHF (congestive heart failure) ([facility]), Chronic kidney disease, CKD (chronic kidney disease) stage 3, GFR 30-59 ml/min ([facility]), Claudication ([facility]), COPD (chronic obstructive pulmonary disease) ([facility]), Degeneration of lumbar or lumbosacral intervertebral disc, Depression, DM (diabetes mellitus) ([facility]), Emphysema of lung ([facility]), Gastritis, GERD (gastroesophageal reflux disease), Hearing loss, Hematuria, Hiatal hernia, History of loop recorder, HTN (hypertension), benign, Hypertriglyceridemia, Incontinence of urine, Irritable bowel syndrome with both constipation and diarrhea, Kidney stone, Knee arthropathy, Long term (current) use of anticoagulants, Lumbosacral spondylosis without myelopathy, Migraine headache, Mumps, Neuropathy, Obesity, On home oxygen therapy, HIGINIO on CPAP, Otitis media, Secondary diabetes mellitus with stage 3 chronic kidney disease (GFR 30-59) ([facility]), STEMI (ST elevation myocardial infarction) ([facility]), Stroke ([facility]), TIA (transient ischemic attack), Tinnitus, Type 2 diabetes mellitus without complication ([facility]), Type II or unspecified type diabetes mellitus without mention of complication, not 
Notified Dr. Buckley via perfect serve, of PT's Orthostatic BP results, done today around 0940. Also notified of pt's urine output for 10 hours and recent dx of UTI on last admission. Dr. Buckley ordered normal saline at 75 mL/hr and rocephin was started.   
Nutrition Assessment     Type and Reason for Visit: Initial    Nutrition Recommendations/Plan:   Will provide Cardiac Diet with 4 carbohydrate choices per tray and Ensure High Protein once daily     Malnutrition Assessment:  Malnutrition Status: No malnutrition    Nutrition Assessment:  Pt admitted due to Syncope. She states she is consuming all food provided and has tried Chocolate Glucerna supplement.    Estimated Daily Nutrient Needs:  Energy (kcal):  [place] x1-1.1= 5306-8964 kcal Weight Used for Energy Requirements: Admission     Protein (g):  1-1.1g/kg=  g Weight Used for Protein Requirements: Admission        Fluid (ml/day):    Method Used for Fluid Requirements: Defer to provider    Nutrition Related Findings:   Trace generalized edema, Labs: Glu 162 ,Meds: Reviewed, PMH: DM, CKD, COPD, CHF Wound Type: None    Current Nutrition Therapies:    ADULT DIET; Regular; 4 carb choices (60 gm/meal); Low Fat/Low Chol/High Fiber/2 gm Na  ADULT ORAL NUTRITION SUPPLEMENT; Dinner; Low Calorie/High Protein Oral Supplement    Anthropometric Measures:  Height: 157.5 cm (5' 2\")  Current Body Wt: 97 kg (213 lb 13.5 oz)   BMI: 39.1        Nutrition Diagnosis:   No nutrition diagnosis at this time     Nutrition Interventions:   Food and/or Nutrient Delivery: Continue Current Diet, Modify Oral Nutrition Supplement  Nutrition Education/Counseling: No recommendation at this time                   Nutrition Monitoring and Evaluation:      Food/Nutrient Intake Outcomes: Food and Nutrient Intake       Discharge Planning:    Continue current diet     Jimena Euceda RD, LD  Contact: [phone]    
Patient discharged to  via [facility] with all belongings including cell phone and  A&O x's 4, VS WNL.   
Physical Therapy  [facility]   Physical Therapy Evaluation  Date: 25  Patient Name: Mariangel Abreu       Room:   MRN: [id number]  Account: [id number]   : 1950  (74 y.o.) Gender: female     Discharge Recommendations:  Discharge Recommendations: Continue to assess pending progress     PT D/C Equipment  Equipment Needed: No     Past Medical History:  has a past medical history of Abdominal bloating, Adenomatous polyp of ascending colon, Allergic rhinitis, Anxiety, Arthritis, Asthma, Atrial fibrillation ([facility]), Back pain, Benign hypertension with CKD (chronic kidney disease) stage III ([facility]), CAD (coronary artery disease), Caffeine use, CHF (congestive heart failure) ([facility]), Chronic kidney disease, CKD (chronic kidney disease) stage 3, GFR 30-59 ml/min ([facility]), Claudication ([facility]), COPD (chronic obstructive pulmonary disease) ([facility]), Degeneration of lumbar or lumbosacral intervertebral disc, Depression, DM (diabetes mellitus) ([facility]), Emphysema of lung ([facility]), Gastritis, GERD (gastroesophageal reflux disease), Hearing loss, Hematuria, Hiatal hernia, History of loop recorder, HTN (hypertension), benign, Hypertriglyceridemia, Incontinence of urine, Irritable bowel syndrome with both constipation and diarrhea, Kidney stone, Knee arthropathy, Long term (current) use of anticoagulants, Lumbosacral spondylosis without myelopathy, Migraine headache, Mumps, Neuropathy, Obesity, On home oxygen therapy, HIGINIO on CPAP, Otitis media, Secondary diabetes mellitus with stage 3 chronic kidney disease (GFR 30-59) ([facility]), STEMI (ST elevation myocardial infarction) ([facility]), Stroke ([facility]), TIA (transient ischemic attack), Tinnitus, Type 2 diabetes mellitus without complication ([facility]), Type II or unspecified type diabetes mellitus without mention of complication, not stated as uncontrolled, UTI (lower urinary tract infection), and Varicose vein.  Past Surgical History:   has a past surgical history that 
Pt arrived to floor via stretcher from ED and was transfered to bed.  Vitals taken, telemetry applied. Admission and assessment complete. No distress noted. See doc flowsheet and admission navigator for details. POC and education initiated and reviewed with patient. Call light within reach, and pt educated on its use. Bed in lowest position, and locked. Side rails up x 2. Denied further questions or needs at this time. Will continue to monitor.   
Report given to Abimbola ALEXIS from OV.  
Writer agrees with REUBEN Richards charting.   
left renal cysts with the largest measuring 4.5 cm. 7. Status post cholecystectomy. 8. Moderate multilevel degenerative disc disease in the cervical and lumbar spine. 9. No acute osseous abnormality of the thoracic spine, lumbar spine, sacrum or hips.     CT CHEST ABDOMEN PELVIS WO CONTRAST Additional Contrast? None    Result Date: 2/16/2025  1. No acute intracranial abnormality. 2. No acute osseous abnormality of the cervical spine. 3. No acute intrathoracic, intra-abdominal or intrapelvic abnormality. 4. Stable right middle lobe pulmonary nodule measuring 8.7 mm. 5. Punctate right nephrolithiasis but no obstructive uropathy on either side. 6. Simple left renal cysts with the largest measuring 4.5 cm. 7. Status post cholecystectomy. 8. Moderate multilevel degenerative disc disease in the cervical and lumbar spine. 9. No acute osseous abnormality of the thoracic spine, lumbar spine, sacrum or hips.     CT THORACIC SPINE BONY RECONSTRUCTION    Result Date: 2/16/2025  1. No evidence of acute traumatic injury of the thoracic or lumbar spine. 2. Multilevel degenerative disc disease in the thoracic and lumbar spine as described above.     CT LUMBAR SPINE BONY RECONSTRUCTION    Result Date: 2/16/2025  1. No evidence of acute traumatic injury of the thoracic or lumbar spine. 2. Multilevel degenerative disc disease in the thoracic and lumbar spine as described above.     XR KNEE LEFT (MIN 4 VIEWS)    Result Date: 2/16/2025  1. Tricompartment osteoarthritic changes noted bilaterally, mild-to-moderate in degree 2. No evidence of an acute fracture or traumatic malalignment involving the right or left knee     XR KNEE RIGHT (MIN 4 VIEWS)    Result Date: 2/16/2025  1. Tricompartment osteoarthritic changes noted bilaterally, mild-to-moderate in degree 2. No evidence of an acute fracture or traumatic malalignment involving the right or left knee         Plan:     Patient status inpatient in the Progressive Unit/Step 
side. 6. Simple left renal cysts with the largest measuring 4.5 cm. 7. Status post cholecystectomy. 8. Moderate multilevel degenerative disc disease in the cervical and lumbar spine. 9. No acute osseous abnormality of the thoracic spine, lumbar spine, sacrum or hips.     CT CERVICAL SPINE WO CONTRAST    Result Date: 2/16/2025  1. No acute intracranial abnormality. 2. No acute osseous abnormality of the cervical spine. 3. No acute intrathoracic, intra-abdominal or intrapelvic abnormality. 4. Stable right middle lobe pulmonary nodule measuring 8.7 mm. 5. Punctate right nephrolithiasis but no obstructive uropathy on either side. 6. Simple left renal cysts with the largest measuring 4.5 cm. 7. Status post cholecystectomy. 8. Moderate multilevel degenerative disc disease in the cervical and lumbar spine. 9. No acute osseous abnormality of the thoracic spine, lumbar spine, sacrum or hips.     CT CHEST ABDOMEN PELVIS WO CONTRAST Additional Contrast? None    Result Date: 2/16/2025  1. No acute intracranial abnormality. 2. No acute osseous abnormality of the cervical spine. 3. No acute intrathoracic, intra-abdominal or intrapelvic abnormality. 4. Stable right middle lobe pulmonary nodule measuring 8.7 mm. 5. Punctate right nephrolithiasis but no obstructive uropathy on either side. 6. Simple left renal cysts with the largest measuring 4.5 cm. 7. Status post cholecystectomy. 8. Moderate multilevel degenerative disc disease in the cervical and lumbar spine. 9. No acute osseous abnormality of the thoracic spine, lumbar spine, sacrum or hips.     CT THORACIC SPINE BONY RECONSTRUCTION    Result Date: 2/16/2025  1. No evidence of acute traumatic injury of the thoracic or lumbar spine. 2. Multilevel degenerative disc disease in the thoracic and lumbar spine as described above.     CT LUMBAR SPINE BONY RECONSTRUCTION    Result Date: 2/16/2025  1. No evidence of acute traumatic injury of the thoracic or lumbar spine. 2. Multilevel

## 2025-02-19 NOTE — PLAN OF CARE
Problem: Chronic Conditions and Co-morbidities  Goal: Patient's chronic conditions and co-morbidity symptoms are monitored and maintained or improved  2/19/2025 0311 by Prabha Beatty, RN  Outcome: Progressing     Problem: Discharge Planning  Goal: Discharge to home or other facility with appropriate resources  Outcome: Progressing     Problem: Pain  Goal: Verbalizes/displays adequate comfort level or baseline comfort level  Outcome: Progressing     Problem: Skin/Tissue Integrity  Goal: Skin integrity remains intact  Description: 1.  Monitor for areas of redness and/or skin breakdown  2.  Assess vascular access sites hourly  3.  Every 4-6 hours minimum:  Change oxygen saturation probe site  4.  Every 4-6 hours:  If on nasal continuous positive airway pressure, respiratory therapy assess nares and determine need for appliance change or resting period  Outcome: Progressing     Problem: ABCDS Injury Assessment  Goal: Absence of physical injury  Outcome: Progressing     Problem: Safety - Adult  Goal: Free from fall injury  2/19/2025 0311 by Prabha Beatty, RN  Outcome: Progressing

## 2025-03-01 ENCOUNTER — APPOINTMENT (OUTPATIENT)
Dept: GENERAL RADIOLOGY | Age: 75
End: 2025-03-01
Payer: MEDICARE

## 2025-03-01 ENCOUNTER — HOSPITAL ENCOUNTER (EMERGENCY)
Age: 75
Discharge: NURSING, REHAB OR LONG-TERM CARE | End: 2025-03-01
Attending: EMERGENCY MEDICINE
Payer: MEDICARE

## 2025-03-01 VITALS
BODY MASS INDEX: 40.48 KG/M2 | DIASTOLIC BLOOD PRESSURE: 74 MMHG | HEART RATE: 72 BPM | OXYGEN SATURATION: 95 % | SYSTOLIC BLOOD PRESSURE: 131 MMHG | RESPIRATION RATE: 16 BRPM | HEIGHT: 62 IN | TEMPERATURE: 98.6 F | WEIGHT: 220 LBS

## 2025-03-01 DIAGNOSIS — S40.012A CONTUSION OF LEFT SHOULDER, INITIAL ENCOUNTER: Primary | ICD-10-CM
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
## 2025-03-01 DIAGNOSIS — S80.02XA CONTUSION OF LEFT KNEE, INITIAL ENCOUNTER: ICD-10-CM

## 2025-03-01 PROCEDURE — 99284 EMERGENCY DEPT VISIT MOD MDM: CPT

## 2025-03-01 PROCEDURE — 73030 X-RAY EXAM OF SHOULDER: CPT

## 2025-03-01 PROCEDURE — 73562 X-RAY EXAM OF KNEE 3: CPT

## 2025-03-01 PROCEDURE — 6370000000 HC RX 637 (ALT 250 FOR IP): Performed by: EMERGENCY MEDICINE

## 2025-03-01 RX ORDER — OXYCODONE AND ACETAMINOPHEN 5; 325 MG/1; MG/1
1 TABLET ORAL ONCE
Status: COMPLETED | OUTPATIENT
Start: 2025-03-01 | End: 2025-03-01

## 2025-03-01 RX ADMIN — OXYCODONE HYDROCHLORIDE AND ACETAMINOPHEN 1 TABLET: 5; 325 TABLET ORAL at 03:30

## 2025-03-01 ASSESSMENT — PAIN - FUNCTIONAL ASSESSMENT: PAIN_FUNCTIONAL_ASSESSMENT: 0-10

## 2025-03-01 ASSESSMENT — ENCOUNTER SYMPTOMS
COLOR CHANGE: 1
BACK PAIN: 0
ABDOMINAL PAIN: 0

## 2025-03-01 ASSESSMENT — PAIN SCALES - GENERAL
PAINLEVEL_OUTOF10: 4
PAINLEVEL_OUTOF10: 8

## 2025-03-01 ASSESSMENT — PAIN DESCRIPTION - FREQUENCY: FREQUENCY: CONTINUOUS

## 2025-03-01 ASSESSMENT — PAIN DESCRIPTION - PAIN TYPE: TYPE: ACUTE PAIN

## 2025-03-01 ASSESSMENT — PAIN DESCRIPTION - LOCATION: LOCATION: SHOULDER;KNEE

## 2025-03-01 ASSESSMENT — PAIN DESCRIPTION - ORIENTATION: ORIENTATION: LEFT

## 2025-03-01 NOTE — ED NOTES
Report called over to nurse at [facility], updates given regarding pt status.

## 2025-03-01 NOTE — ED PROVIDER NOTES
I25.10, Z98.61    TIA (transient ischemic attack) G45.9    Facial pain, acute R51.9    Type 2 diabetes mellitus with hyperglycemia, with long-term current use of insulin (HCC) E11.65, Z79.4    Uncontrolled blood glucose R73.09    Elevated erythrocyte sedimentation rate R70.0    Elevated C-reactive protein (CRP) R79.82    Chest pain R07.9    Sprain of temporomandibular joint or ligament S03.40XA    Neck pain M54.2    Angina at rest I20.89    Syncope and collapse R55     SURGICAL HISTORY       Past Surgical History:   Procedure Laterality Date    ABLATION OF DYSRHYTHMIC FOCUS  2000    CARDIAC PROCEDURE N/A 2/1/2024    klarissa / Left heart cath / coronary angiography / stemi er 22 performed by Victoriano Dee MD at [facility] CARDIAC CATH LAB    CARDIAC PROCEDURE N/A 3/11/2024    klarissa / Percutaneous coronary intervention performed by Victoriano Dee MD at [facility] CARDIAC CATH LAB    CARPAL TUNNEL RELEASE Right     CATARACT REMOVAL WITH IMPLANT Bilateral     CHOLECYSTECTOMY  2007    COLONOSCOPY      COLONOSCOPY  04/10/2017    tubular adenomo polyp , mod. sigmoid diverticulosis, min. int. hemorrhoids    COLONOSCOPY N/A 3/2/2021    COLONOSCOPY WITH BIOPSY performed by Moris Brenner MD at [facility] ENDO    CYSTOSCOPY  9/25/2013    DILATION AND CURETTAGE  1979    EYE SURGERY      laser    INCONTINENCE SURGERY      Percutaneous nerve stimulation Dr Augie Amaya 2013     INSERTABLE CARDIAC MONITOR  12/04/2015    [employer] REVEAL LINQ MODEL #[id number] MRI CONDTIONAL OK 3T. IMMEDIATELY POST IMPLANT    OTHER SURGICAL HISTORY  09/10/15    removal of interstim    SD COLSC FLX W/REMOVAL LESION BY HOT BX FORCEPS N/A 4/10/2017    COLONOSCOPY POLYPECTOMY HOT BIOPSY performed by Ksenia Marroquin MD at [facility] OR    TONSILLECTOMY      TUBAL LIGATION      TYMPANOPLASTY      TYMPANOPLASTY      TYMPANOSTOMY TUBE PLACEMENT  08/21/2017    UPPER GASTROINTESTINAL ENDOSCOPY  03/2016    Dr Freeman    UPPER GASTROINTESTINAL ENDOSCOPY N/A 3/2/2021    EGD BIOPSY performed    Pulse: 73 68 74 73   Resp: 17 19 17 14   Temp: 97.7 °F (36.5 °C)      TempSrc: Oral      SpO2: 93% 91% 94% 95%   Weight: 99.8 kg (220 lb)      Height: 1.575 m (5' 2\")        MEDICATIONS GIVEN TO PATIENT THIS ENCOUNTER:  Orders Placed This Encounter   Medications    oxyCODONE-acetaminophen (PERCOCET) 5-325 MG per tablet 1 tablet     DISCHARGE PRESCRIPTIONS:  New Prescriptions    No medications on file     PHYSICIAN CONSULTS ORDERED THIS ENCOUNTER:  None  FINAL IMPRESSION      1. Contusion of left shoulder, initial encounter    2. Contusion of left knee, initial encounter    3. Fall, initial encounter          DISPOSITION/PLAN   DISPOSITION Decision To Discharge 03/01/2025 05:19:11 AM   DISPOSITION CONDITION Stable           OUTPATIENT FOLLOW UP THE PATIENT:  No follow-up provider specified.    DO Miladys Choi Mansour Mohamed I, DO  03/01/25 0531

## 2025-03-01 NOTE — ED NOTES
Report given to Enid Liu RN from ED.   Report method by phone   The following was reviewed with receiving RN:   Current vital signs:  BP 94/80   Pulse 66   Temp 97.7 °F (36.5 °C) (Oral)   Resp 16   Ht 1.575 m (5' 2\")   Wt 99.8 kg (220 lb)   LMP  (LMP Unknown)   SpO2 95%   BMI 40.24 kg/m²                MEWS Score: 1     Any medication or safety alerts were reviewed. Any pending diagnostics and notifications were also reviewed, as well as any safety concerns or issues, abnormal labs, abnormal imaging, and abnormal assessment findings. Questions were answered.

## 2025-03-01 NOTE — ED NOTES
Mode of arrival (squad #, walk in, police, etc) : EMS        Chief complaint(s): Fall, Shoulder and Knee (left) Pain        Arrival Note (brief scenario, treatment PTA, etc).:Patient was walking to the bathroom and perceived that she was leaning against a wall but there was no actual support present causing her to lose her balance and fell, landed on her left  shoulder-sustaining a bruise and tenderness  on the affected area and reports of left knee pain as well. Denies LOC or head trauma.     C= \"Have you ever felt that you should Cut down on your drinking?\"  No  A= \"Have people Annoyed you by criticizing your drinking?\"  No  G= \"Have you ever felt bad or Guilty about your drinking?\"  No  E= \"Have you ever had a drink as an Eye-opener first thing in the morning to steady your nerves or to help a hangover?\"  No      Deferred []      Reason for deferring: N/A    *If yes to two or more: probable alcohol abuse.*

## 2025-03-28 ENCOUNTER — HOSPITAL ENCOUNTER (OUTPATIENT)
Dept: PAIN MANAGEMENT | Age: 75
Discharge: HOME | End: 2025-03-28

## 2025-03-28 ENCOUNTER — HOSPITAL ENCOUNTER (INPATIENT)
Age: 75
LOS: 3 days | Discharge: ANOTHER HOSPITAL | DRG: 302 | End: 2025-03-31
Attending: EMERGENCY MEDICINE | Admitting: INTERNAL MEDICINE
Payer: MEDICARE

## 2025-03-28 ENCOUNTER — APPOINTMENT (OUTPATIENT)
Dept: CT IMAGING | Age: 75
DRG: 302 | End: 2025-03-28
Payer: MEDICARE

## 2025-03-28 ENCOUNTER — APPOINTMENT (OUTPATIENT)
Dept: GENERAL RADIOLOGY | Age: 75
DRG: 302 | End: 2025-03-28
Attending: EMERGENCY MEDICINE
Payer: MEDICARE

## 2025-03-28 DIAGNOSIS — R07.9 CHEST PAIN, UNSPECIFIED TYPE: Primary | ICD-10-CM

## 2025-03-28 LAB
ALBUMIN SERPL-MCNC: 4 G/DL (ref 3.5–5.2)
ALP SERPL-CCNC: 79 U/L (ref 35–104)
ALT SERPL-CCNC: 9 U/L (ref 10–35)
ANION GAP SERPL CALCULATED.3IONS-SCNC: 14 MMOL/L (ref 9–16)
AST SERPL-CCNC: 16 U/L (ref 10–35)
BACTERIA URNS QL MICRO: ABNORMAL
BASOPHILS # BLD: 0 K/UL (ref 0–0.2)
BASOPHILS NFR BLD: 0 % (ref 0–2)
BILIRUB SERPL-MCNC: 0.3 MG/DL (ref 0–1.2)
BILIRUB UR QL STRIP: NEGATIVE
BNP SERPL-MCNC: 271 PG/ML (ref 0–300)
BUN SERPL-MCNC: 19 MG/DL (ref 8–23)
CALCIUM SERPL-MCNC: 9.4 MG/DL (ref 8.6–10.4)
CASTS #/AREA URNS LPF: ABNORMAL /LPF
CHLORIDE SERPL-SCNC: 98 MMOL/L (ref 98–107)
CHP ED QC CHECK: YES
CLARITY UR: CLEAR
CO2 SERPL-SCNC: 23 MMOL/L (ref 20–31)
COLOR UR: YELLOW
CREAT SERPL-MCNC: 1.3 MG/DL (ref 0.7–1.2)
EOSINOPHIL # BLD: 0.16 K/UL (ref 0–0.4)
EOSINOPHILS RELATIVE PERCENT: 2 % (ref 0–4)
EPI CELLS #/AREA URNS HPF: ABNORMAL /HPF
ERYTHROCYTE [DISTWIDTH] IN BLOOD BY AUTOMATED COUNT: 15.2 % (ref 11.5–14.9)
GFR, ESTIMATED: 43 ML/MIN/1.73M2
GLUCOSE BLD-MCNC: 143 MG/DL
GLUCOSE BLD-MCNC: 143 MG/DL (ref 65–105)
GLUCOSE BLD-MCNC: 420 MG/DL (ref 65–105)
GLUCOSE SERPL-MCNC: 387 MG/DL (ref 74–99)
GLUCOSE UR STRIP-MCNC: ABNORMAL MG/DL
HCT VFR BLD AUTO: 43.4 % (ref 36–46)
HGB BLD-MCNC: 14.6 G/DL (ref 12–16)
HGB UR QL STRIP.AUTO: ABNORMAL
KETONES UR STRIP-MCNC: NEGATIVE MG/DL
LEUKOCYTE ESTERASE UR QL STRIP: NEGATIVE
LYMPHOCYTES NFR BLD: 2.43 K/UL (ref 1–4.8)
LYMPHOCYTES RELATIVE PERCENT: 30 % (ref 24–44)
MCH RBC QN AUTO: 30 PG (ref 26–34)
MCHC RBC AUTO-ENTMCNC: 33.6 G/DL (ref 31–37)
MCV RBC AUTO: 89.3 FL (ref 80–100)
MONOCYTES NFR BLD: 0.65 K/UL (ref 0.1–1.3)
MONOCYTES NFR BLD: 8 % (ref 1–7)
MORPHOLOGY: ABNORMAL
NEUTROPHILS NFR BLD: 60 % (ref 36–66)
NEUTS SEG NFR BLD: 4.86 K/UL (ref 1.3–9.1)
NITRITE UR QL STRIP: NEGATIVE
PH UR STRIP: 5 [PH] (ref 5–8)
PLATELET # BLD AUTO: 175 K/UL (ref 150–450)
PMV BLD AUTO: 8.7 FL (ref 6–12)
POTASSIUM SERPL-SCNC: 4.1 MMOL/L (ref 3.7–5.3)
PROT SERPL-MCNC: 7.7 G/DL (ref 6.6–8.7)
PROT UR STRIP-MCNC: NEGATIVE MG/DL
RBC # BLD AUTO: 4.86 M/UL (ref 4–5.2)
RBC #/AREA URNS HPF: ABNORMAL /HPF
SODIUM SERPL-SCNC: 135 MMOL/L (ref 136–145)
SP GR UR STRIP: 1.03 (ref 1–1.03)
TROPONIN I SERPL HS-MCNC: 10 NG/L (ref 0–14)
TROPONIN I SERPL HS-MCNC: 10 NG/L (ref 0–14)
TROPONIN I SERPL HS-MCNC: 13 NG/L (ref 0–14)
TROPONIN I SERPL HS-MCNC: 13 NG/L (ref 0–14)
UROBILINOGEN UR STRIP-ACNC: NORMAL EU/DL (ref 0–1)
WBC #/AREA URNS HPF: ABNORMAL /HPF
WBC OTHER # BLD: 8.1 K/UL (ref 3.5–11)

## 2025-03-28 PROCEDURE — 83880 ASSAY OF NATRIURETIC PEPTIDE: CPT

## 2025-03-28 PROCEDURE — 96374 THER/PROPH/DIAG INJ IV PUSH: CPT

## 2025-03-28 PROCEDURE — 6370000000 HC RX 637 (ALT 250 FOR IP): Performed by: EMERGENCY MEDICINE

## 2025-03-28 PROCEDURE — 80053 COMPREHEN METABOLIC PANEL: CPT

## 2025-03-28 PROCEDURE — 85025 COMPLETE CBC W/AUTO DIFF WBC: CPT

## 2025-03-28 PROCEDURE — 5A09357 ASSISTANCE WITH RESPIRATORY VENTILATION, LESS THAN 24 CONSECUTIVE HOURS, CONTINUOUS POSITIVE AIRWAY PRESSURE: ICD-10-PCS | Performed by: INTERNAL MEDICINE

## 2025-03-28 PROCEDURE — 96375 TX/PRO/DX INJ NEW DRUG ADDON: CPT

## 2025-03-28 PROCEDURE — 81001 URINALYSIS AUTO W/SCOPE: CPT

## 2025-03-28 PROCEDURE — 36415 COLL VENOUS BLD VENIPUNCTURE: CPT

## 2025-03-28 PROCEDURE — 71260 CT THORAX DX C+: CPT

## 2025-03-28 PROCEDURE — 99285 EMERGENCY DEPT VISIT HI MDM: CPT

## 2025-03-28 PROCEDURE — 2060000000 HC ICU INTERMEDIATE R&B

## 2025-03-28 PROCEDURE — 2500000003 HC RX 250 WO HCPCS

## 2025-03-28 PROCEDURE — 2500000003 HC RX 250 WO HCPCS: Performed by: EMERGENCY MEDICINE

## 2025-03-28 PROCEDURE — 93005 ELECTROCARDIOGRAM TRACING: CPT | Performed by: EMERGENCY MEDICINE

## 2025-03-28 PROCEDURE — 2700000000 HC OXYGEN THERAPY PER DAY

## 2025-03-28 PROCEDURE — 94660 CPAP INITIATION&MGMT: CPT

## 2025-03-28 PROCEDURE — 94761 N-INVAS EAR/PLS OXIMETRY MLT: CPT

## 2025-03-28 PROCEDURE — 84484 ASSAY OF TROPONIN QUANT: CPT

## 2025-03-28 PROCEDURE — 82947 ASSAY GLUCOSE BLOOD QUANT: CPT

## 2025-03-28 PROCEDURE — 6360000004 HC RX CONTRAST MEDICATION: Performed by: EMERGENCY MEDICINE

## 2025-03-28 PROCEDURE — 2580000003 HC RX 258: Performed by: EMERGENCY MEDICINE

## 2025-03-28 PROCEDURE — 6360000002 HC RX W HCPCS: Performed by: EMERGENCY MEDICINE

## 2025-03-28 PROCEDURE — 6370000000 HC RX 637 (ALT 250 FOR IP): Performed by: NURSE PRACTITIONER

## 2025-03-28 PROCEDURE — 94640 AIRWAY INHALATION TREATMENT: CPT

## 2025-03-28 PROCEDURE — 6370000000 HC RX 637 (ALT 250 FOR IP)

## 2025-03-28 PROCEDURE — 71045 X-RAY EXAM CHEST 1 VIEW: CPT

## 2025-03-28 RX ORDER — NITROGLYCERIN 0.4 MG/1
0.4 TABLET SUBLINGUAL EVERY 5 MIN PRN
Status: DISCONTINUED | OUTPATIENT
Start: 2025-03-28 | End: 2025-03-31 | Stop reason: HOSPADM

## 2025-03-28 RX ORDER — ATORVASTATIN CALCIUM 40 MG/1
40 TABLET, FILM COATED ORAL DAILY
Status: ON HOLD | COMMUNITY
End: 2025-03-31

## 2025-03-28 RX ORDER — POTASSIUM CHLORIDE 7.45 MG/ML
10 INJECTION INTRAVENOUS PRN
Status: DISCONTINUED | OUTPATIENT
Start: 2025-03-28 | End: 2025-03-31 | Stop reason: HOSPADM

## 2025-03-28 RX ORDER — INSULIN GLARGINE 100 [IU]/ML
50 INJECTION, SOLUTION SUBCUTANEOUS EVERY MORNING
Status: DISCONTINUED | OUTPATIENT
Start: 2025-03-29 | End: 2025-03-31 | Stop reason: HOSPADM

## 2025-03-28 RX ORDER — ISOSORBIDE MONONITRATE 60 MG/1
60 TABLET, EXTENDED RELEASE ORAL DAILY
Status: DISCONTINUED | OUTPATIENT
Start: 2025-03-29 | End: 2025-03-31 | Stop reason: HOSPADM

## 2025-03-28 RX ORDER — ATORVASTATIN CALCIUM 40 MG/1
40 TABLET, FILM COATED ORAL DAILY
Status: DISCONTINUED | OUTPATIENT
Start: 2025-03-29 | End: 2025-03-31 | Stop reason: HOSPADM

## 2025-03-28 RX ORDER — TOPIRAMATE 100 MG/1
100 TABLET, FILM COATED ORAL NIGHTLY
Status: DISCONTINUED | OUTPATIENT
Start: 2025-03-29 | End: 2025-03-31 | Stop reason: HOSPADM

## 2025-03-28 RX ORDER — SODIUM CHLORIDE 0.9 % (FLUSH) 0.9 %
10 SYRINGE (ML) INJECTION PRN
Status: DISCONTINUED | OUTPATIENT
Start: 2025-03-28 | End: 2025-03-31 | Stop reason: HOSPADM

## 2025-03-28 RX ORDER — ALOGLIPTIN 12.5 MG/1
12.5 TABLET, FILM COATED ORAL DAILY
Status: DISCONTINUED | OUTPATIENT
Start: 2025-03-29 | End: 2025-03-31 | Stop reason: HOSPADM

## 2025-03-28 RX ORDER — ENOXAPARIN SODIUM 100 MG/ML
40 INJECTION SUBCUTANEOUS DAILY
Status: DISCONTINUED | OUTPATIENT
Start: 2025-03-29 | End: 2025-03-31 | Stop reason: HOSPADM

## 2025-03-28 RX ORDER — ALBUTEROL SULFATE 90 UG/1
2 INHALANT RESPIRATORY (INHALATION) 4 TIMES DAILY PRN
Status: DISCONTINUED | OUTPATIENT
Start: 2025-03-28 | End: 2025-03-31 | Stop reason: HOSPADM

## 2025-03-28 RX ORDER — METOPROLOL TARTRATE 25 MG/1
12.5 TABLET, FILM COATED ORAL 2 TIMES DAILY
Status: DISCONTINUED | OUTPATIENT
Start: 2025-03-28 | End: 2025-03-31 | Stop reason: HOSPADM

## 2025-03-28 RX ORDER — BUDESONIDE AND FORMOTEROL FUMARATE DIHYDRATE 160; 4.5 UG/1; UG/1
2 AEROSOL RESPIRATORY (INHALATION)
Status: DISCONTINUED | OUTPATIENT
Start: 2025-03-28 | End: 2025-03-31 | Stop reason: HOSPADM

## 2025-03-28 RX ORDER — IPRATROPIUM BROMIDE AND ALBUTEROL SULFATE 2.5; .5 MG/3ML; MG/3ML
1 SOLUTION RESPIRATORY (INHALATION) ONCE
Status: COMPLETED | OUTPATIENT
Start: 2025-03-28 | End: 2025-03-28

## 2025-03-28 RX ORDER — PANTOPRAZOLE SODIUM 40 MG/1
40 TABLET, DELAYED RELEASE ORAL 2 TIMES DAILY
COMMUNITY

## 2025-03-28 RX ORDER — RANOLAZINE 500 MG/1
500 TABLET, EXTENDED RELEASE ORAL 2 TIMES DAILY
Status: DISCONTINUED | OUTPATIENT
Start: 2025-03-28 | End: 2025-03-31 | Stop reason: HOSPADM

## 2025-03-28 RX ORDER — 0.9 % SODIUM CHLORIDE 0.9 %
1000 INTRAVENOUS SOLUTION INTRAVENOUS ONCE
Status: COMPLETED | OUTPATIENT
Start: 2025-03-28 | End: 2025-03-28

## 2025-03-28 RX ORDER — ACETAMINOPHEN 325 MG/1
650 TABLET ORAL EVERY 6 HOURS PRN
Status: DISCONTINUED | OUTPATIENT
Start: 2025-03-28 | End: 2025-03-31 | Stop reason: HOSPADM

## 2025-03-28 RX ORDER — ALBUTEROL SULFATE 0.83 MG/ML
2.5 SOLUTION RESPIRATORY (INHALATION) EVERY 6 HOURS PRN
Status: DISCONTINUED | OUTPATIENT
Start: 2025-03-28 | End: 2025-03-31 | Stop reason: HOSPADM

## 2025-03-28 RX ORDER — ASPIRIN 81 MG/1
81 TABLET ORAL DAILY
Status: DISCONTINUED | OUTPATIENT
Start: 2025-03-29 | End: 2025-03-31 | Stop reason: HOSPADM

## 2025-03-28 RX ORDER — ACETAMINOPHEN 650 MG/1
650 SUPPOSITORY RECTAL EVERY 6 HOURS PRN
Status: DISCONTINUED | OUTPATIENT
Start: 2025-03-28 | End: 2025-03-31 | Stop reason: HOSPADM

## 2025-03-28 RX ORDER — ATORVASTATIN CALCIUM 40 MG/1
40 TABLET, FILM COATED ORAL NIGHTLY
Status: DISCONTINUED | OUTPATIENT
Start: 2025-03-28 | End: 2025-03-28 | Stop reason: SDUPTHER

## 2025-03-28 RX ORDER — SODIUM CHLORIDE 9 MG/ML
INJECTION, SOLUTION INTRAVENOUS PRN
Status: DISCONTINUED | OUTPATIENT
Start: 2025-03-28 | End: 2025-03-31 | Stop reason: HOSPADM

## 2025-03-28 RX ORDER — DOCUSATE SODIUM 100 MG/1
100 CAPSULE, LIQUID FILLED ORAL 2 TIMES DAILY
Status: DISCONTINUED | OUTPATIENT
Start: 2025-03-28 | End: 2025-03-31 | Stop reason: HOSPADM

## 2025-03-28 RX ORDER — PANTOPRAZOLE SODIUM 40 MG/1
40 TABLET, DELAYED RELEASE ORAL 2 TIMES DAILY
Status: DISCONTINUED | OUTPATIENT
Start: 2025-03-28 | End: 2025-03-31 | Stop reason: HOSPADM

## 2025-03-28 RX ORDER — ONDANSETRON 2 MG/ML
4 INJECTION INTRAMUSCULAR; INTRAVENOUS EVERY 6 HOURS PRN
Status: DISCONTINUED | OUTPATIENT
Start: 2025-03-28 | End: 2025-03-31 | Stop reason: HOSPADM

## 2025-03-28 RX ORDER — INSULIN GLARGINE 100 [IU]/ML
45 INJECTION, SOLUTION SUBCUTANEOUS NIGHTLY
Status: DISCONTINUED | OUTPATIENT
Start: 2025-03-28 | End: 2025-03-31 | Stop reason: HOSPADM

## 2025-03-28 RX ORDER — NITROGLYCERIN 0.4 MG/1
0.4 TABLET SUBLINGUAL ONCE
Status: COMPLETED | OUTPATIENT
Start: 2025-03-28 | End: 2025-03-28

## 2025-03-28 RX ORDER — ONDANSETRON 4 MG/1
4 TABLET, ORALLY DISINTEGRATING ORAL EVERY 8 HOURS PRN
Status: DISCONTINUED | OUTPATIENT
Start: 2025-03-28 | End: 2025-03-31 | Stop reason: HOSPADM

## 2025-03-28 RX ORDER — ESCITALOPRAM OXALATE 20 MG/1
20 TABLET ORAL NIGHTLY
Status: DISCONTINUED | OUTPATIENT
Start: 2025-03-28 | End: 2025-03-31 | Stop reason: HOSPADM

## 2025-03-28 RX ORDER — IOPAMIDOL 755 MG/ML
75 INJECTION, SOLUTION INTRAVASCULAR
Status: COMPLETED | OUTPATIENT
Start: 2025-03-28 | End: 2025-03-28

## 2025-03-28 RX ORDER — INSULIN LISPRO 100 [IU]/ML
0-16 INJECTION, SOLUTION INTRAVENOUS; SUBCUTANEOUS
Status: DISCONTINUED | OUTPATIENT
Start: 2025-03-28 | End: 2025-03-31 | Stop reason: HOSPADM

## 2025-03-28 RX ORDER — DIPHENHYDRAMINE HYDROCHLORIDE 50 MG/ML
25 INJECTION, SOLUTION INTRAMUSCULAR; INTRAVENOUS ONCE
Status: COMPLETED | OUTPATIENT
Start: 2025-03-28 | End: 2025-03-28

## 2025-03-28 RX ORDER — MAGNESIUM SULFATE 1 G/100ML
1000 INJECTION INTRAVENOUS PRN
Status: DISCONTINUED | OUTPATIENT
Start: 2025-03-28 | End: 2025-03-31 | Stop reason: HOSPADM

## 2025-03-28 RX ORDER — DEXTROSE MONOHYDRATE 100 MG/ML
INJECTION, SOLUTION INTRAVENOUS CONTINUOUS PRN
Status: DISCONTINUED | OUTPATIENT
Start: 2025-03-28 | End: 2025-03-31 | Stop reason: HOSPADM

## 2025-03-28 RX ORDER — POTASSIUM CHLORIDE 1500 MG/1
40 TABLET, EXTENDED RELEASE ORAL PRN
Status: DISCONTINUED | OUTPATIENT
Start: 2025-03-28 | End: 2025-03-31 | Stop reason: HOSPADM

## 2025-03-28 RX ORDER — MIDODRINE HYDROCHLORIDE 2.5 MG/1
2.5 TABLET ORAL 2 TIMES DAILY WITH MEALS
Status: DISCONTINUED | OUTPATIENT
Start: 2025-03-29 | End: 2025-03-31 | Stop reason: HOSPADM

## 2025-03-28 RX ORDER — OXYBUTYNIN CHLORIDE 5 MG/1
5 TABLET, EXTENDED RELEASE ORAL NIGHTLY
Status: DISCONTINUED | OUTPATIENT
Start: 2025-03-29 | End: 2025-03-31 | Stop reason: HOSPADM

## 2025-03-28 RX ORDER — SODIUM CHLORIDE 0.9 % (FLUSH) 0.9 %
5-40 SYRINGE (ML) INJECTION EVERY 12 HOURS SCHEDULED
Status: DISCONTINUED | OUTPATIENT
Start: 2025-03-28 | End: 2025-03-31 | Stop reason: HOSPADM

## 2025-03-28 RX ORDER — IPRATROPIUM BROMIDE AND ALBUTEROL SULFATE 2.5; .5 MG/3ML; MG/3ML
1 SOLUTION RESPIRATORY (INHALATION) EVERY 4 HOURS PRN
Status: DISCONTINUED | OUTPATIENT
Start: 2025-03-28 | End: 2025-03-31 | Stop reason: HOSPADM

## 2025-03-28 RX ORDER — 0.9 % SODIUM CHLORIDE 0.9 %
100 INTRAVENOUS SOLUTION INTRAVENOUS ONCE
Status: COMPLETED | OUTPATIENT
Start: 2025-03-28 | End: 2025-03-28

## 2025-03-28 RX ADMIN — ESCITALOPRAM OXALATE 20 MG: 20 TABLET, FILM COATED ORAL at 22:46

## 2025-03-28 RX ADMIN — SODIUM CHLORIDE 100 ML: 9 INJECTION, SOLUTION INTRAVENOUS at 15:57

## 2025-03-28 RX ADMIN — RANOLAZINE 500 MG: 500 TABLET, EXTENDED RELEASE ORAL at 22:48

## 2025-03-28 RX ADMIN — SODIUM CHLORIDE, PRESERVATIVE FREE 10 ML: 5 INJECTION INTRAVENOUS at 22:52

## 2025-03-28 RX ADMIN — Medication 3 MG: at 23:01

## 2025-03-28 RX ADMIN — WATER 60 MG: 1 INJECTION INTRAMUSCULAR; INTRAVENOUS; SUBCUTANEOUS at 14:28

## 2025-03-28 RX ADMIN — INSULIN LISPRO 16 UNITS: 100 INJECTION, SOLUTION INTRAVENOUS; SUBCUTANEOUS at 23:01

## 2025-03-28 RX ADMIN — METOPROLOL TARTRATE 12.5 MG: 25 TABLET, FILM COATED ORAL at 22:46

## 2025-03-28 RX ADMIN — INSULIN HUMAN 7 UNITS: 100 INJECTION, SOLUTION PARENTERAL at 14:41

## 2025-03-28 RX ADMIN — IOPAMIDOL 75 ML: 755 INJECTION, SOLUTION INTRAVENOUS at 15:57

## 2025-03-28 RX ADMIN — SODIUM CHLORIDE 1000 ML: 0.9 INJECTION, SOLUTION INTRAVENOUS at 14:28

## 2025-03-28 RX ADMIN — SODIUM CHLORIDE, PRESERVATIVE FREE 10 ML: 5 INJECTION INTRAVENOUS at 15:57

## 2025-03-28 RX ADMIN — INSULIN GLARGINE 45 UNITS: 100 INJECTION, SOLUTION SUBCUTANEOUS at 22:48

## 2025-03-28 RX ADMIN — PANTOPRAZOLE SODIUM 40 MG: 40 TABLET, DELAYED RELEASE ORAL at 22:47

## 2025-03-28 RX ADMIN — TICAGRELOR 90 MG: 90 TABLET ORAL at 22:46

## 2025-03-28 RX ADMIN — NITROGLYCERIN 0.4 MG: 0.4 TABLET SUBLINGUAL at 18:42

## 2025-03-28 RX ADMIN — IPRATROPIUM BROMIDE AND ALBUTEROL SULFATE 1 DOSE: .5; 2.5 SOLUTION RESPIRATORY (INHALATION) at 16:30

## 2025-03-28 RX ADMIN — MICONAZOLE NITRATE: 20 POWDER TOPICAL at 22:50

## 2025-03-28 RX ADMIN — DOCUSATE SODIUM 100 MG: 100 CAPSULE, LIQUID FILLED ORAL at 22:46

## 2025-03-28 RX ADMIN — DIPHENHYDRAMINE HYDROCHLORIDE 25 MG: 50 INJECTION INTRAMUSCULAR; INTRAVENOUS at 14:28

## 2025-03-28 ASSESSMENT — PAIN - FUNCTIONAL ASSESSMENT: PAIN_FUNCTIONAL_ASSESSMENT: NONE - DENIES PAIN

## 2025-03-28 ASSESSMENT — PAIN SCALES - GENERAL: PAINLEVEL_OUTOF10: 8

## 2025-03-28 NOTE — PROGRESS NOTES
for constipation 4/17/24  Yes Carla Chua APRN - CNP   aspirin 81 MG EC tablet Take 1 tablet by mouth daily 3/13/24  Yes Victoriano Combs MD   omeprazole (PRILOSEC) 20 MG delayed release capsule Take 1 capsule by mouth in the morning and at bedtime  Patient not taking: Reported on 3/28/2025    Provider, MD Nick   dapagliflozin (FARXIGA) 5 MG tablet Take 1 tablet by mouth every morning    Provider, MD Nick   insulin glargine (LANTUS SOLOSTAR) 100 UNIT/ML injection pen INJECT 50 UNITS INTO THE SKIN EVERY MORNING AND 45 UNITS NIGHTLY.  Patient taking differently: INJECT 50 UNITS INTO THE SKIN EVERY MORNING AND 40 UNITS NIGHTLY. 2/11/25 2/11/26  Carla Chua APRN - CNP   budesonide-formoterol (SYMBICORT) 160-4.5 MCG/ACT AERO INHALE 2 PUFFS INTO THE LUNGS 2 TIMES DAILY AS NEEDED FOR FOR SHORTNESS OF BREATH 11/26/24 11/26/25  Carla Chua APRN - CNP   insulin aspart (NOVOLOG FLEXPEN) 100 UNIT/ML injection pen 5 units with each meal plus IF<139 NO INSULIN; 140-199-4 UN;200-249-8 UN;250-299-10 UN;300-349-12 UN;350-400=16 UN;ABOVE 400: 20 UNIT(S)  Patient taking differently: Inject 5-17 Units into the skin 3 times daily (before meals) 5 units with each meal plus IF<139 NO INSULIN; 140-199=2 units ;200-249= 4 units ;250-299= 6 units  ;300-349= 8 units  ;350-400=10 units ;ABOVE 400 = 12 units 11/13/24   Carla Chua APRN - CNP   nystatin (MYCOSTATIN) 023556 UNIT/GM powder Apply 3 times daily. 11/13/24   Carla Chua APRN - CNP   gabapentin (NEURONTIN) 100 MG capsule Take 1 capsule by mouth 2 times daily for 30 days. 11/3/24 3/4/25  Genia Ramos MD   Insulin Pen Needle (ULTICARE MINI PEN NEEDLES) 31G X 6 MM MISC Inject 5 times daily 10/15/24   Chua, Carla J, APRN - CNP   Blood Gluc Meter Disp-Strips (BLOOD GLUCOSE METER DISPOSABLE) HARMEET Check up to 5 times a day 10/15/24   Carla Chua, VERONIKA - CNP   Lancets MISC 1 each by Does not apply route 5 times daily 10/15/24 11/14/24   Carla Chua APRN - CNP   Continuous Glucose  (FREESTYLE KHADRA 2 READER) HARMEET Apply  with khadra sensor daily and as needed 7/26/24   Carla Chua APRN - CNP   Continuous Glucose Sensor (FREESTYLE KHADRA 2 SENSOR) MISC Apply to arm once every 2 weeks. 7/26/24   Carla Chua APRN - CNP   ipratropium 0.5 mg-albuterol 2.5 mg (DUONEB) 0.5-2.5 (3) MG/3ML SOLN nebulizer solution 3 mLs 4 times daily 4/22/16   ProviderNick MD   albuterol (PROVENTIL) (2.5 MG/3ML) 0.083% nebulizer solution Take 3 mLs by nebulization every 6 hours as needed for Wheezing    ProviderNick MD   fluticasone furoate-vilanterol (BREO ELLIPTA) 100-25 MCG/ACT inhaler Inhale 1 puff into the lungs daily  Patient not taking: Reported on 3/28/2025 1/26/24   Carla Chua APRN - CNP   albuterol sulfate HFA (VENTOLIN HFA) 108 (90 Base) MCG/ACT inhaler Inhale 2 puffs into the lungs 4 times daily as needed for Wheezing 9/9/23   Vicente Yee MD   Incontinence Supply Disposable MISC Disposable justin pad to change 2 times a day 7/20/23   Carla Chua APRN - CNP   Incontinence Supplies MISC Disposable incontinence liner pad, to change every 4 hours as needed for urinary incontinence 7/20/23   Carla Chua APRN - CNP   OXYGEN Inhale 3 L into the lungs     Provider, MD Nick         Please let me know if you have any questions about this encounter. Thank you!    Electronically signed by Porsha Lambert on 3/28/2025 at 7:37 PM

## 2025-03-28 NOTE — ED NOTES
Report received from REUBEN Valentin.   Report method in person.     Any medication or safety alerts were reviewed. Any pending diagnostics and notifications were also reviewed, as well as any safety concerns or issues, abnormal labs, abnormal imaging, and abnormal assessment findings. Questions were answered.

## 2025-03-28 NOTE — DISCHARGE INSTRUCTIONS
You have received CT contrast today. Do not take your metformin for the next 2 days. You can restart on Monday.

## 2025-03-28 NOTE — ED NOTES
Report given to REUBEN Montana from ED.   Report method in person   The following was reviewed with receiving RN:   Current vital signs:  BP (!) 136/112   Pulse 83   Temp 97.8 °F (36.6 °C)   Resp 24   Ht 1.61 m (5' 3.39\")   Wt 95.3 kg (210 lb)   LMP  (LMP Unknown)   SpO2 94%   BMI 36.75 kg/m²                MEWS Score: 1     Any medication or safety alerts were reviewed. Any pending diagnostics and notifications were also reviewed, as well as any safety concerns or issues, abnormal labs, abnormal imaging, and abnormal assessment findings. Questions were answered.

## 2025-03-28 NOTE — ED NOTES
Mode of arrival (squad #, walk in, police, etc) : [id number]        Chief complaint(s): SOB, dizziness        Arrival Note (brief scenario, treatment PTA, etc).: Patient came into the ER from home after home health aide called 911 due to patient having increased weakness and dizziness. Aide also reported patient's bp was high. Per patient she usually gets around the house with walker but felt dizzy when standing and felt like her legs were shaky. Patient alert and oriented and oriented x4 GCS 15.         C= \"Have you ever felt that you should Cut down on your drinking?\"  No  A= \"Have people Annoyed you by criticizing your drinking?\"  No  G= \"Have you ever felt bad or Guilty about your drinking?\"  No  E= \"Have you ever had a drink as an Eye-opener first thing in the morning to steady your nerves or to help a hangover?\"  No      Deferred []      Reason for deferring: N/A    *If yes to two or more: probable alcohol abuse.*

## 2025-03-28 NOTE — ED PROVIDER NOTES
(congestive heart failure) ([facility])     Chronic kidney disease     CKD (chronic kidney disease) stage 3, GFR 30-59 ml/min ([facility])     Claudication 6/2/2024    COPD (chronic obstructive pulmonary disease) ([facility])     emphysema    Degeneration of lumbar or lumbosacral intervertebral disc     Depression     DM (diabetes mellitus) ([facility])     Emphysema of lung ([facility])     Gastritis     GERD (gastroesophageal reflux disease)     Hearing loss     Hematuria     Hiatal hernia     History of loop recorder     HTN (hypertension), benign 06/28/2014    Hypertriglyceridemia     Incontinence of urine 09/25/2013    Irritable bowel syndrome with both constipation and diarrhea 01/26/2021    Kidney stone     Knee arthropathy     Long term (current) use of anticoagulants 02/08/2024    Lumbosacral spondylosis without myelopathy 09/29/2014    Migraine headache     DR. GRACIA    Mumps     Neuropathy     Obesity     On home oxygen therapy     2 L PER NC  HS AND PRN    HIGINIO on CPAP     Otitis media 01/26/2015    Secondary diabetes mellitus with stage 3 chronic kidney disease (GFR 30-59) ([facility])     STEMI (ST elevation myocardial infarction) ([facility]) 02/01/2024    Stroke ([facility])      mini stroke.    TIA (transient ischemic attack) 11/1/2024    Tinnitus     Type 2 diabetes mellitus without complication     Type II or unspecified type diabetes mellitus without mention of complication, not stated as uncontrolled     UTI (lower urinary tract infection)     Varicose vein      SURGICAL HISTORY       Past Surgical History:   Procedure Laterality Date    ABLATION OF DYSRHYTHMIC FOCUS  2000    CARDIAC PROCEDURE N/A 2/1/2024    klarissa / Left heart cath / coronary angiography / stemi er 22 performed by Victoriano Dee MD at [facility] CARDIAC CATH LAB    CARDIAC PROCEDURE N/A 3/11/2024    klarissa / Percutaneous coronary intervention performed by Victoriano Dee MD at [facility] CARDIAC CATH LAB    CARDIAC PROCEDURE N/A 3/31/2025    ali / Left heart cath / coronary angiography / op  admit for observation overnight, telemetry and cardiology consult.     Procedures    DIAGNOSTIC RESULTS   EKG: All EKG's are interpreted by the Emergency Department Physician who either signs or Co-signs this chart inthe absence of a cardiologist.      RADIOLOGY:All plain film, CT, MRI, and formal ultrasound images (except ED bedside ultrasound) are read by the radiologist, see reports below, unless otherwise noted in MDM or here.  No orders to display     LABS: All lab results were reviewed by myself, and all abnormals are listed below.  Labs Reviewed - No data to display  EMERGENCY DEPARTMENT COURSE:   Vitals:    Vitals:    03/28/25 1310   BP: 101/80   Pulse: 98   Resp: 18   Temp: 97.8 °F (36.6 °C)   SpO2: 94%   Weight: 95.3 kg (210 lb)   Height: 1.61 m (5' 3.39\")       The patient was given the following medications while in the emergency department:  No orders of the defined types were placed in this encounter.    CONSULTS:  None    FINAL IMPRESSION    No diagnosis found.      DISPOSITION/PLAN   DISPOSITION                 PATIENT REFERRED TO:  No follow-up provider specified.  DISCHARGE MEDICATIONS:  New Prescriptions    No medications on file     Jasmin Kee MD  AttendingEmergency Physician                        Jasmin Kee MD  03/28/25 [id number]

## 2025-03-29 PROBLEM — E44.0 MODERATE MALNUTRITION: Status: ACTIVE | Noted: 2025-03-29

## 2025-03-29 LAB
ANION GAP SERPL CALCULATED.3IONS-SCNC: 11 MMOL/L (ref 9–16)
BUN SERPL-MCNC: 22 MG/DL (ref 8–23)
CALCIUM SERPL-MCNC: 9.2 MG/DL (ref 8.6–10.4)
CHLORIDE SERPL-SCNC: 101 MMOL/L (ref 98–107)
CHOLEST SERPL-MCNC: 241 MG/DL (ref 0–199)
CHOLESTEROL/HDL RATIO: 5.1
CO2 SERPL-SCNC: 25 MMOL/L (ref 20–31)
CREAT SERPL-MCNC: 1.2 MG/DL (ref 0.7–1.2)
ERYTHROCYTE [DISTWIDTH] IN BLOOD BY AUTOMATED COUNT: 15.2 % (ref 11.5–14.9)
GFR, ESTIMATED: 47 ML/MIN/1.73M2
GLUCOSE BLD-MCNC: 185 MG/DL (ref 65–105)
GLUCOSE BLD-MCNC: 216 MG/DL (ref 65–105)
GLUCOSE BLD-MCNC: 235 MG/DL (ref 65–105)
GLUCOSE BLD-MCNC: 308 MG/DL (ref 65–105)
GLUCOSE SERPL-MCNC: 209 MG/DL (ref 74–99)
HCT VFR BLD AUTO: 42 % (ref 36–46)
HDLC SERPL-MCNC: 47 MG/DL
HGB BLD-MCNC: 13.6 G/DL (ref 12–16)
LDLC SERPL CALC-MCNC: 176 MG/DL (ref 0–100)
MAGNESIUM SERPL-MCNC: 1.6 MG/DL (ref 1.6–2.4)
MCH RBC QN AUTO: 29.6 PG (ref 26–34)
MCHC RBC AUTO-ENTMCNC: 32.4 G/DL (ref 31–37)
MCV RBC AUTO: 91.3 FL (ref 80–100)
PLATELET # BLD AUTO: 202 K/UL (ref 150–450)
PMV BLD AUTO: 10.2 FL (ref 6–12)
POTASSIUM SERPL-SCNC: 4.5 MMOL/L (ref 3.7–5.3)
RBC # BLD AUTO: 4.6 M/UL (ref 4–5.2)
SODIUM SERPL-SCNC: 137 MMOL/L (ref 136–145)
TRIGL SERPL-MCNC: 90 MG/DL (ref 0–149)
WBC OTHER # BLD: 10.8 K/UL (ref 3.5–11)

## 2025-03-29 PROCEDURE — 6370000000 HC RX 637 (ALT 250 FOR IP)

## 2025-03-29 PROCEDURE — 6360000002 HC RX W HCPCS

## 2025-03-29 PROCEDURE — 6370000000 HC RX 637 (ALT 250 FOR IP): Performed by: INTERNAL MEDICINE

## 2025-03-29 PROCEDURE — 97110 THERAPEUTIC EXERCISES: CPT

## 2025-03-29 PROCEDURE — 36415 COLL VENOUS BLD VENIPUNCTURE: CPT

## 2025-03-29 PROCEDURE — 2700000000 HC OXYGEN THERAPY PER DAY

## 2025-03-29 PROCEDURE — 82947 ASSAY GLUCOSE BLOOD QUANT: CPT

## 2025-03-29 PROCEDURE — 83735 ASSAY OF MAGNESIUM: CPT

## 2025-03-29 PROCEDURE — 97116 GAIT TRAINING THERAPY: CPT

## 2025-03-29 PROCEDURE — 94761 N-INVAS EAR/PLS OXIMETRY MLT: CPT

## 2025-03-29 PROCEDURE — 2500000003 HC RX 250 WO HCPCS

## 2025-03-29 PROCEDURE — 2060000000 HC ICU INTERMEDIATE R&B

## 2025-03-29 PROCEDURE — 80061 LIPID PANEL: CPT

## 2025-03-29 PROCEDURE — 80048 BASIC METABOLIC PNL TOTAL CA: CPT

## 2025-03-29 PROCEDURE — 6370000000 HC RX 637 (ALT 250 FOR IP): Performed by: NURSE PRACTITIONER

## 2025-03-29 PROCEDURE — 99223 1ST HOSP IP/OBS HIGH 75: CPT | Performed by: INTERNAL MEDICINE

## 2025-03-29 PROCEDURE — 85027 COMPLETE CBC AUTOMATED: CPT

## 2025-03-29 PROCEDURE — 97162 PT EVAL MOD COMPLEX 30 MIN: CPT

## 2025-03-29 PROCEDURE — 94640 AIRWAY INHALATION TREATMENT: CPT

## 2025-03-29 RX ORDER — MAGNESIUM HYDROXIDE/ALUMINUM HYDROXICE/SIMETHICONE 120; 1200; 1200 MG/30ML; MG/30ML; MG/30ML
30 SUSPENSION ORAL ONCE
Status: COMPLETED | OUTPATIENT
Start: 2025-03-29 | End: 2025-03-29

## 2025-03-29 RX ORDER — IPRATROPIUM BROMIDE AND ALBUTEROL SULFATE 2.5; .5 MG/3ML; MG/3ML
1 SOLUTION RESPIRATORY (INHALATION)
Status: DISCONTINUED | OUTPATIENT
Start: 2025-03-29 | End: 2025-03-29 | Stop reason: SDUPTHER

## 2025-03-29 RX ORDER — OXYCODONE AND ACETAMINOPHEN 5; 325 MG/1; MG/1
1 TABLET ORAL EVERY 6 HOURS PRN
Refills: 0 | Status: DISCONTINUED | OUTPATIENT
Start: 2025-03-29 | End: 2025-03-31 | Stop reason: HOSPADM

## 2025-03-29 RX ORDER — LIDOCAINE HYDROCHLORIDE 20 MG/ML
5 SOLUTION OROPHARYNGEAL ONCE
Status: COMPLETED | OUTPATIENT
Start: 2025-03-29 | End: 2025-03-29

## 2025-03-29 RX ORDER — IPRATROPIUM BROMIDE AND ALBUTEROL SULFATE 2.5; .5 MG/3ML; MG/3ML
1 SOLUTION RESPIRATORY (INHALATION)
Status: DISCONTINUED | OUTPATIENT
Start: 2025-03-29 | End: 2025-03-31 | Stop reason: HOSPADM

## 2025-03-29 RX ADMIN — SODIUM CHLORIDE, PRESERVATIVE FREE 10 ML: 5 INJECTION INTRAVENOUS at 21:16

## 2025-03-29 RX ADMIN — DOCUSATE SODIUM 100 MG: 100 CAPSULE, LIQUID FILLED ORAL at 21:14

## 2025-03-29 RX ADMIN — INSULIN GLARGINE 45 UNITS: 100 INJECTION, SOLUTION SUBCUTANEOUS at 21:11

## 2025-03-29 RX ADMIN — ISOSORBIDE MONONITRATE 60 MG: 60 TABLET, EXTENDED RELEASE ORAL at 08:35

## 2025-03-29 RX ADMIN — ESCITALOPRAM OXALATE 20 MG: 20 TABLET, FILM COATED ORAL at 21:14

## 2025-03-29 RX ADMIN — INSULIN GLARGINE 50 UNITS: 100 INJECTION, SOLUTION SUBCUTANEOUS at 08:34

## 2025-03-29 RX ADMIN — INSULIN LISPRO 4 UNITS: 100 INJECTION, SOLUTION INTRAVENOUS; SUBCUTANEOUS at 16:42

## 2025-03-29 RX ADMIN — DOCUSATE SODIUM 100 MG: 100 CAPSULE, LIQUID FILLED ORAL at 08:35

## 2025-03-29 RX ADMIN — EMPAGLIFLOZIN 10 MG: 10 TABLET, FILM COATED ORAL at 08:35

## 2025-03-29 RX ADMIN — SODIUM CHLORIDE, PRESERVATIVE FREE 10 ML: 5 INJECTION INTRAVENOUS at 08:37

## 2025-03-29 RX ADMIN — RANOLAZINE 500 MG: 500 TABLET, EXTENDED RELEASE ORAL at 08:36

## 2025-03-29 RX ADMIN — MICONAZOLE NITRATE: 20 POWDER TOPICAL at 08:37

## 2025-03-29 RX ADMIN — METOPROLOL TARTRATE 12.5 MG: 25 TABLET, FILM COATED ORAL at 08:35

## 2025-03-29 RX ADMIN — INSULIN LISPRO 12 UNITS: 100 INJECTION, SOLUTION INTRAVENOUS; SUBCUTANEOUS at 11:42

## 2025-03-29 RX ADMIN — MICONAZOLE NITRATE: 20 POWDER TOPICAL at 21:17

## 2025-03-29 RX ADMIN — ALOGLIPTIN 12.5 MG: 12.5 TABLET, FILM COATED ORAL at 08:35

## 2025-03-29 RX ADMIN — MAGNESIUM SULFATE HEPTAHYDRATE 1000 MG: 1 INJECTION, SOLUTION INTRAVENOUS at 10:48

## 2025-03-29 RX ADMIN — TOPIRAMATE 100 MG: 100 TABLET, FILM COATED ORAL at 21:14

## 2025-03-29 RX ADMIN — OXYCODONE HYDROCHLORIDE AND ACETAMINOPHEN 1 TABLET: 5; 325 TABLET ORAL at 21:13

## 2025-03-29 RX ADMIN — OXYBUTYNIN CHLORIDE 5 MG: 5 TABLET, EXTENDED RELEASE ORAL at 21:14

## 2025-03-29 RX ADMIN — PANTOPRAZOLE SODIUM 40 MG: 40 TABLET, DELAYED RELEASE ORAL at 21:15

## 2025-03-29 RX ADMIN — INSULIN LISPRO 4 UNITS: 100 INJECTION, SOLUTION INTRAVENOUS; SUBCUTANEOUS at 08:34

## 2025-03-29 RX ADMIN — INSULIN LISPRO 4 UNITS: 100 INJECTION, SOLUTION INTRAVENOUS; SUBCUTANEOUS at 21:11

## 2025-03-29 RX ADMIN — Medication 2 PUFF: at 08:49

## 2025-03-29 RX ADMIN — MIDODRINE HYDROCHLORIDE 2.5 MG: 2.5 TABLET ORAL at 16:42

## 2025-03-29 RX ADMIN — ASPIRIN 81 MG: 81 TABLET, COATED ORAL at 08:35

## 2025-03-29 RX ADMIN — OXYCODONE HYDROCHLORIDE AND ACETAMINOPHEN 1 TABLET: 5; 325 TABLET ORAL at 14:08

## 2025-03-29 RX ADMIN — TICAGRELOR 90 MG: 90 TABLET ORAL at 21:14

## 2025-03-29 RX ADMIN — ENOXAPARIN SODIUM 40 MG: 100 INJECTION SUBCUTANEOUS at 08:35

## 2025-03-29 RX ADMIN — METOPROLOL TARTRATE 12.5 MG: 25 TABLET, FILM COATED ORAL at 21:14

## 2025-03-29 RX ADMIN — MAGNESIUM SULFATE HEPTAHYDRATE 1000 MG: 1 INJECTION, SOLUTION INTRAVENOUS at 08:55

## 2025-03-29 RX ADMIN — RANOLAZINE 500 MG: 500 TABLET, EXTENDED RELEASE ORAL at 21:15

## 2025-03-29 RX ADMIN — MIDODRINE HYDROCHLORIDE 2.5 MG: 2.5 TABLET ORAL at 11:22

## 2025-03-29 RX ADMIN — Medication 2 PUFF: at 19:41

## 2025-03-29 RX ADMIN — PANTOPRAZOLE SODIUM 40 MG: 40 TABLET, DELAYED RELEASE ORAL at 08:36

## 2025-03-29 RX ADMIN — Medication 3 MG: at 23:55

## 2025-03-29 RX ADMIN — TICAGRELOR 90 MG: 90 TABLET ORAL at 08:35

## 2025-03-29 RX ADMIN — ATORVASTATIN CALCIUM 40 MG: 40 TABLET, FILM COATED ORAL at 08:35

## 2025-03-29 RX ADMIN — ALUMINUM HYDROXIDE, MAGNESIUM HYDROXIDE, AND SIMETHICONE 30 ML: 200; 200; 20 SUSPENSION ORAL at 14:09

## 2025-03-29 RX ADMIN — LIDOCAINE HYDROCHLORIDE 5 ML: 20 SOLUTION ORAL at 14:11

## 2025-03-29 RX ADMIN — NITROGLYCERIN 0.4 MG: 0.4 TABLET SUBLINGUAL at 12:29

## 2025-03-29 ASSESSMENT — PAIN DESCRIPTION - PAIN TYPE
TYPE: ACUTE PAIN

## 2025-03-29 ASSESSMENT — PAIN DESCRIPTION - ORIENTATION
ORIENTATION: LEFT
ORIENTATION: LEFT
ORIENTATION: MID;LOWER
ORIENTATION: LEFT

## 2025-03-29 ASSESSMENT — PAIN DESCRIPTION - DESCRIPTORS
DESCRIPTORS: PRESSURE
DESCRIPTORS: ACHING;CRAMPING
DESCRIPTORS: PRESSURE

## 2025-03-29 ASSESSMENT — PAIN DESCRIPTION - LOCATION
LOCATION: BACK
LOCATION: CHEST
LOCATION: BACK;CHEST
LOCATION: CHEST

## 2025-03-29 ASSESSMENT — PAIN SCALES - GENERAL
PAINLEVEL_OUTOF10: 5
PAINLEVEL_OUTOF10: 6
PAINLEVEL_OUTOF10: 7
PAINLEVEL_OUTOF10: 7
PAINLEVEL_OUTOF10: 5
PAINLEVEL_OUTOF10: 5
PAINLEVEL_OUTOF10: 7
PAINLEVEL_OUTOF10: 8
PAINLEVEL_OUTOF10: 7

## 2025-03-29 ASSESSMENT — PAIN DESCRIPTION - FREQUENCY: FREQUENCY: INTERMITTENT

## 2025-03-29 NOTE — PROGRESS NOTES
Physical Therapy    [facility]   Physical Therapy Evaluation  Date: 3/29/25  Patient Name: Mariangel Abreu       Room:   MRN: [id number]  Account: [id number]   : 1950  (74 y.o.) Gender: female     Discharge Recommendations:  Discharge Recommendations: Therapy recommended at discharge, Patient would benefit from continued therapy after discharge           Past Medical History:  has a past medical history of Abdominal bloating, Adenomatous polyp of ascending colon, Allergic rhinitis, Anxiety, Arthritis, Asthma, Atrial fibrillation ([facility]), Back pain, Benign hypertension with CKD (chronic kidney disease) stage III ([facility]), CAD (coronary artery disease), Caffeine use, CHF (congestive heart failure) ([facility]), Chronic kidney disease, CKD (chronic kidney disease) stage 3, GFR 30-59 ml/min ([facility]), Claudication, COPD (chronic obstructive pulmonary disease) ([facility]), Degeneration of lumbar or lumbosacral intervertebral disc, Depression, DM (diabetes mellitus) ([facility]), Emphysema of lung ([facility]), Gastritis, GERD (gastroesophageal reflux disease), Hearing loss, Hematuria, Hiatal hernia, History of loop recorder, HTN (hypertension), benign, Hypertriglyceridemia, Incontinence of urine, Irritable bowel syndrome with both constipation and diarrhea, Kidney stone, Knee arthropathy, Long term (current) use of anticoagulants, Lumbosacral spondylosis without myelopathy, Migraine headache, Mumps, Neuropathy, Obesity, On home oxygen therapy, HIGINIO on CPAP, Otitis media, Secondary diabetes mellitus with stage 3 chronic kidney disease (GFR 30-59) ([facility]), STEMI (ST elevation myocardial infarction) ([facility]), Stroke ([facility]), TIA (transient ischemic attack), Tinnitus, Type 2 diabetes mellitus without complication ([facility]), Type II or unspecified type diabetes mellitus without mention of complication, not stated as uncontrolled, UTI (lower urinary tract infection), and Varicose vein.  Past Surgical History:   has a past  bedrails?: A Little  How much help is needed moving from lying on your back to sitting on the side of a flat bed without using bedrails?: A Little  How much help is needed moving to and from a bed to a chair?: A Little  How much help is needed standing up from a chair using your arms?: A Little  How much help is needed walking in hospital room?: A Little  How much help is needed climbing 3-5 steps with a railing?: Total  AM-PAC Inpatient Mobility Raw Score : 16  AM-PAC Inpatient T-Scale Score : 40.78  Mobility Inpatient CMS 0-100% Score: 54.16  Mobility Inpatient CMS G-Code Modifier : CK     Goals  Patient Goals   Patient Goals : My legs go numb, tehy feel weak, I don't want to fall.  Short Term Goals  Time Frame for Short Term Goals: 6 to 7 visits  Short Term Goal 1: Mod-I bed mobility with use of bed rail.  Short Term Goal 2: Pt to demonstate safe transfers with RW SBA  Short Term Goal 3: Pt to ambulate distance of 20 ft x2 SBA with RW  Short Term Goal 4: Pt to progress activity tolerance to 8 minutes of standing/walking activity to safely tolerate functional mobility and ADL tasks      Plan  Physical Therapy Plan  General Plan: 5-7 times per week  Current Treatment Recommendations: Strengthening, Balance training, Functional mobility training, Transfer training, Endurance training, Gait training, Home exercise program, Safety education & training, Patient/Caregiver education & training, Neuromuscular re-education, Positioning, Therapeutic activities   Safety Devices  Type of Devices: Left in bed, Gait belt, Chair alarm in place, Bed alarm in place, All fall risk precautions in place, Patient at risk for falls       PT Individual Minutes  Time In: 1513  Time Out: 1602  Minutes: 49   Time Code Minutes  Timed Code Treatment Minutes: 25 Minutes       Electronically signed by Nabil Tate PT on 3/29/25 at 5:49 PM EDT

## 2025-03-29 NOTE — PLAN OF CARE
Problem: Chronic Conditions and Co-morbidities  Goal: Patient's chronic conditions and co-morbidity symptoms are monitored and maintained or improved  Outcome: Progressing  Flowsheets (Taken 3/29/2025 0856)  Care Plan - Patient's Chronic Conditions and Co-Morbidity Symptoms are Monitored and Maintained or Improved: Monitor and assess patient's chronic conditions and comorbid symptoms for stability, deterioration, or improvement     Problem: Discharge Planning  Goal: Discharge to home or other facility with appropriate resources  Outcome: Progressing  Flowsheets (Taken 3/29/2025 0856)  Discharge to home or other facility with appropriate resources: Identify barriers to discharge with patient and caregiver     Problem: Skin/Tissue Integrity  Goal: Skin integrity remains intact  Description: 1.  Monitor for areas of redness and/or skin breakdown  2.  Assess vascular access sites hourly  3.  Every 4-6 hours minimum:  Change oxygen saturation probe site  4.  Every 4-6 hours:  If on nasal continuous positive airway pressure, respiratory therapy assess nares and determine need for appliance change or resting period  Outcome: Progressing  Flowsheets  Taken 3/29/2025 1222  Skin Integrity Remains Intact: Monitor for areas of redness and/or skin breakdown  Taken 3/29/2025 0856  Skin Integrity Remains Intact: Monitor for areas of redness and/or skin breakdown     Problem: Safety - Adult  Goal: Free from fall injury  Outcome: Progressing  Flowsheets (Taken 3/29/2025 1222)  Free From Fall Injury: Instruct family/caregiver on patient safety     Problem: Pain  Goal: Verbalizes/displays adequate comfort level or baseline comfort level  Outcome: Progressing     Problem: ABCDS Injury Assessment  Goal: Absence of physical injury  Outcome: Progressing

## 2025-03-29 NOTE — PROGRESS NOTES
Comprehensive Nutrition Assessment    Type and Reason for Visit:  Initial, Positive nutrition screen (Poor appetite and intake, wt loss.)    Nutrition Recommendations/Plan:   Continue current diet.  Change oral nutrition supplements to Low Calorie, High Protein type.      Malnutrition Assessment:  Malnutrition Status:  Moderate malnutrition (03/29/25 1820)    Context:  Acute Illness (Acute on chronic)     Findings of the 6 clinical characteristics of malnutrition:  Energy Intake:  75% or less of estimated energy requirements for 7 or more days  Weight Loss:  Greater than 7.5% over 3 months     Body Fat Loss:  Mild body fat loss Triceps   Muscle Mass Loss:  Unable to assess    Fluid Accumulation:  No fluid accumulation     Strength:  Not Performed    Nutrition Assessment:    Pt admitted from home due to chest pain. Pt states she felt depressed after son's passing 1 yr ago and PO intake decreased with wt loss noted. Pt reported usual wt of 265 lb (possibly in distant past- unable to find record of wt at this amount). Pt states she sometimes would skip meals and knows that she should not do that with diabetes. Pt optomistic that she can improve PO intake at home. Has been eating well here; observed 100% of dinner tray consumed. Will switch oral nutrition supplement to a lower carbohydrate version of Ensure High Protein.    Nutrition Related Findings:    POC Glucose: 143-420 since 3/28. Chol: 241, LDL: 176 (3/29). Solu-medrol, Lantus, Jardiance, Lispro sliding scale. Other labs and meds reviewed. Hx: anxiety, A-fib, CHF, CKD, COPD, diabetes mellitus, HIGINIO, stroke, TIA.         Current Nutrition Intake & Therapies:    Average Meal Intake: %     ADULT DIET; Regular; 4 carb choices (60 gm/meal); No Caffeine  ADULT ORAL NUTRITION SUPPLEMENT; Breakfast, Lunch, Dinner; Standard High Calorie/High Protein Oral Supplement    Anthropometric Measures:  Height: 161 cm (5' 3.39\")  Ideal Body Weight (IBW): 117 lbs (53 kg)        Current Body Weight: 90.1 kg (198 lb 10.2 oz) (3/29), 169.8 % IBW. Weight Source: Bed scale  Current BMI (kg/m2): 34.8  Usual Body Weight: 102.5 kg (225 lb 15.5 oz) (102.5 kg (12/18/24); 106.8 kg (7/10/24))     % Weight Change (Calculated): -12.1                         Estimated Daily Nutrient Needs:  Energy Requirements Based On: Formula  Weight Used for Energy Requirements: Admission  Energy (kcal/day): 1800 based on [place] Jeor with 1.3 factor  Weight Used for Protein Requirements: Admission  Protein (g/day): 90 based on 1 gm per kg (needs may vary with renal function)  Method Used for Fluid Requirements: 1 ml/kcal  Fluid (ml/day): or per physician    Nutrition Diagnosis:   Moderate malnutrition related to inadequate protein-energy intake as evidenced by criteria as identified in malnutrition assessment    Nutrition Interventions:   Food and/or Nutrient Delivery: Continue Current Diet, Modify Oral Nutrition Supplement  Nutrition Education/Counseling:  (encourage good intake with avoidance of skipping meals)  Coordination of Nutrition Care: Continue to monitor while inpatient       Goals:  Goals: Meet at least 75% of estimated needs (with improvement in glucose control)  Type of Goal: New goal       Nutrition Monitoring and Evaluation:      Food/Nutrient Intake Outcomes: Food and Nutrient Intake, Supplement Intake  Physical Signs/Symptoms Outcomes: Biochemical Data, Weight    Discharge Planning:    Continue current diet (oral nutrition supplements if indicated worked into carbohydrate allowance)     Naomi Chester RD, LD  Contact: [phone]

## 2025-03-29 NOTE — PROGRESS NOTES
[facility]  Richie rFancisco MD; Adi Singh MD; Mejia Kelly MD; MD Cassandra Arnold MD; Rose Buckley MD  [facility]   IN-PATIENT SERVICE  [facility]                 Date:   3/28/2025  Patientname:  Mariangel Abreu  Date of admission:  3/28/2025  1:07 PM  MRN:   [id number]  Account:  [id number]  YOB: 1950  PCP:    Nuzhat Ramos DTR  Room:   2080/2080-01  Code Status:    Full Code      Chief Complaint:     Chief Complaint   Patient presents with    Dizziness    Shortness of Breath   Hypertension    History of Present Illness:     Mariangel Abreu is a 74 y.o. Non- / non  female who presents with Dizziness and Shortness of Breath   and is admitted to the hospital for the management of Chest pain.    Patient's medical history significant for Anxiety, Afib, CHF, CKD, COPD, DM, HIGINIO, Stroke, TIA    According to patient, she has a home health nurse that comes to her home everyday and today her nurse told her that her blood pressure was really high and her blood sugar was 330. She took her morning dose of Lantus and 10 units of Humalog and rechecked her blood sugar an hour later and it read 430. She called her PCP and was instructed to come to the ED. She stated that after going to CT she began to have chest pain and pressure in the mid sternal region, was given 1 dose of 0.4 mg nitro that helped ease her chest pain a little. She complained of leg swelling and weakness that has caused her to fall numerous times at home recently. Upon assessment patient alert and oriented x 4, pupils equal, round and reactive. Lung sounds clear, currently using 2 L NC. Heart sounds with S1 and S2 sounds noted no murmurs or gallops noted. Abdomen is soft with no tenderness or guarding present and active bowel sounds.     In the ED Na 135, Cr 1.3 which appears to be baseline, glucose 387, Troponin 13.   CXR showed Nodular     Otitis media 01/26/2015    Secondary diabetes mellitus with stage 3 chronic kidney disease (GFR 30-59) ([facility])     STEMI (ST elevation myocardial infarction) ([facility]) 02/01/2024    Stroke ([facility])      mini stroke.    TIA (transient ischemic attack) 11/1/2024    Tinnitus     Type 2 diabetes mellitus without complication ([facility])     Type II or unspecified type diabetes mellitus without mention of complication, not stated as uncontrolled     UTI (lower urinary tract infection)     Varicose vein         Past Surgical History:     Past Surgical History:   Procedure Laterality Date    ABLATION OF DYSRHYTHMIC FOCUS  2000    CARDIAC PROCEDURE N/A 2/1/2024    klarissa / Left heart cath / coronary angiography / stemi er 22 performed by Victoriano Dee MD at [facility] CARDIAC CATH LAB    CARDIAC PROCEDURE N/A 3/11/2024    klarissa / Percutaneous coronary intervention performed by Victoriano Dee MD at [facility] CARDIAC CATH LAB    CARPAL TUNNEL RELEASE Right     CATARACT REMOVAL WITH IMPLANT Bilateral     CHOLECYSTECTOMY  2007    COLONOSCOPY      COLONOSCOPY  04/10/2017    tubular adenomo polyp , mod. sigmoid diverticulosis, min. int. hemorrhoids    COLONOSCOPY N/A 3/2/2021    COLONOSCOPY WITH BIOPSY performed by Moris Brenner MD at [facility] ENDO    CYSTOSCOPY  9/25/2013    DILATION AND CURETTAGE  1979    EYE SURGERY      laser    INCONTINENCE SURGERY      Percutaneous nerve stimulation Dr Augie Amaya 2013     INSERTABLE CARDIAC MONITOR  12/04/2015    [employer] REVEAL LINQ MODEL #[id number] MRI CONDTIONAL OK 3T. IMMEDIATELY POST IMPLANT    OTHER SURGICAL HISTORY  09/10/15    removal of interstim    KS COLSC FLX W/REMOVAL LESION BY HOT BX FORCEPS N/A 4/10/2017    COLONOSCOPY POLYPECTOMY HOT BIOPSY performed by Ksenia Marroquin MD at [facility] OR    TONSILLECTOMY      TUBAL LIGATION      TYMPANOPLASTY      TYMPANOPLASTY      TYMPANOSTOMY TUBE PLACEMENT  08/21/2017    UPPER GASTROINTESTINAL ENDOSCOPY  03/2016    Dr Freeman    UPPER GASTROINTESTINAL ENDOSCOPY N/A

## 2025-03-29 NOTE — ED NOTES
Situation  Name: Mariangel Abreu  Admitting: Dx Chest pain [R07.9]  Isolation Precautions No active isolations  Code Status: Full Code  Alerts: N/A  Where is the patient from? Home  HPI: Patient came into the ER from home after home health aide called 911 due to patient having increased weakness and dizziness. Aide also reported patient's bp was high. Per patient she usually gets around the house with walker but felt dizzy when standing and felt like her legs were shaky. Patient alert and oriented and oriented x4 GCS 15.     Background  PMH:   Past Medical History:   Diagnosis Date    Abdominal bloating 01/26/2021    Adenomatous polyp of ascending colon 01/26/2021    Allergic rhinitis     Anxiety 07/17/2013    Arthritis     Asthma     Atrial fibrillation (HCC)     Back pain     NERVE/DR. GRACIA    Benign hypertension with CKD (chronic kidney disease) stage III ([facility])     CAD (coronary artery disease) 03/21/2013    Caffeine use     2 coffee/day    CHF (congestive heart failure) ([facility])     Chronic kidney disease     CKD (chronic kidney disease) stage 3, GFR 30-59 ml/min ([facility])     Claudication 6/2/2024    COPD (chronic obstructive pulmonary disease) ([facility])     emphysema    Degeneration of lumbar or lumbosacral intervertebral disc     Depression     DM (diabetes mellitus) ([facility])     Emphysema of lung ([facility])     Gastritis     GERD (gastroesophageal reflux disease)     Hearing loss     Hematuria     Hiatal hernia     History of loop recorder     HTN (hypertension), benign 06/28/2014    Hypertriglyceridemia     Incontinence of urine 09/25/2013    Irritable bowel syndrome with both constipation and diarrhea 01/26/2021    Kidney stone     Knee arthropathy     Long term (current) use of anticoagulants 02/08/2024    Lumbosacral spondylosis without myelopathy 09/29/2014    Migraine headache     DR. GRACIA    Mumps     Neuropathy     Obesity     On home oxygen therapy     2 L PER NC  HS AND PRN    HIGINIO on CPAP     Otitis media  01/26/2015    Secondary diabetes mellitus with stage 3 chronic kidney disease (GFR 30-59) ([facility])     STEMI (ST elevation myocardial infarction) ([facility]) 02/01/2024    Stroke ([facility])      mini stroke.    TIA (transient ischemic attack) 11/1/2024    Tinnitus     Type 2 diabetes mellitus without complication ([facility])     Type II or unspecified type diabetes mellitus without mention of complication, not stated as uncontrolled     UTI (lower urinary tract infection)     Varicose vein      Allergies:   Allergies   Allergen Reactions    Robitussin [Guaifenesin] Anaphylaxis    Codeine Swelling    Compazine [Prochlorperazine Maleate] Hives and Swelling    Fentanyl     Iodides Itching    Morphine Other (See Comments)     hallucinations    Moxifloxacin      Other reaction(s): Intolerance-unknown  Other reaction(s): Intolerance-unknown    Reglan [Metoclopramide] Nausea Only    Avelox [Moxifloxacin Hcl In Nacl] Rash     Dermatitis      Cipro Xr Rash     dermatitis    Ofloxacin Rash    Sulfa Antibiotics Rash     Diet: No diet orders on file  Activity: In bed  Ambulation status: Walker  Precautions: Fall Risk  Medications   sodium chloride flush 0.9 % injection 10 mL (10 mLs IntraVENous Given 3/28/25 1557)   methylPREDNISolone sodium succ (SOLU-MEDROL) 60 mg in sterile water 0.96 mL injection (60 mg IntraVENous Given 3/28/25 1428)   diphenhydrAMINE (BENADRYL) injection 25 mg (25 mg IntraVENous Given 3/28/25 1428)   sodium chloride 0.9 % bolus 1,000 mL (0 mLs IntraVENous Stopped 3/28/25 1805)   insulin regular (HumuLIN R;NovoLIN R) injection 7 Units (7 Units IntraVENous Given 3/28/25 1441)   sodium chloride 0.9 % bolus 100 mL (0 mLs IntraVENous Stopped 3/28/25 1600)   iopamidol (ISOVUE-370) 76 % injection 75 mL (75 mLs IntraVENous Given 3/28/25 1557)   ipratropium 0.5 mg-albuterol 2.5 mg (DUONEB) nebulizer solution 1 Dose (1 Dose Inhalation Given 3/28/25 1630)   nitroGLYCERIN (NITROSTAT) SL tablet 0.4 mg (0.4 mg SubLINGual Given 3/28/25

## 2025-03-29 NOTE — PROGRESS NOTES
[facility] Internal Medicine  Richie Francisco MD; Adi Singh MD; Mejia Kelly MD; MD Cassandra Arnold MD; Rose Buckley MD  [facility] Internal Medicine   IN-PATIENT SERVICE  [facility]                 Date:   3/29/2025  Patientname:  Mariangel Abreu  Date of admission:  3/28/2025  1:07 PM  MRN:   [id number]  Account:  [id number]  YOB: 1950  PCP:    Nuzhat Ramos DTR  Room:   2080/2080-01  Code Status:    Full Code      Chief Complaint:     Chief Complaint   Patient presents with    Dizziness    Shortness of Breath       History of Present Illness:     Mariangel Abreu is a 74 y.o. Non- / non  female with a history of anxiety, A-fib, CHF, CKD, COPD, diabetes mellitus, HIGINIO, stroke, TIA who presents with Dizziness and Shortness of Breath   and is admitted to the hospital for the management of Chest pain.     According to patient, she has a home health nurse that comes to her home everyday and today her nurse told her that her blood pressure was really high and her blood sugar was 330. She took her morning dose of Lantus and 10 units of Humalog and rechecked her blood sugar an hour later and it read 430. She called her PCP and was instructed to come to the ED. she was discharged from [facility] just a few months ago.    She stated that after going to CT she began to have chest pain and pressure in the mid sternal region, was given 1 dose of 0.4 mg nitro that helped ease her chest pain a little. She complained of leg swelling and weakness that has caused her to fall numerous times at home recently.     Upon assessment patient alert and oriented x 4, pupils equal, round and reactive. Lung sounds clear, currently using 2 L NC. Heart sounds with S1 and S2 sounds noted no murmurs or gallops noted. Abdomen is soft with no tenderness or guarding present and active bowel sounds.      In the ED Na 135, Cr 1.3 which appears to be baseline,  arm once every 2 weeks. 7/26/24   Carla Chua APRN - CNP   ipratropium 0.5 mg-albuterol 2.5 mg (DUONEB) 0.5-2.5 (3) MG/3ML SOLN nebulizer solution 3 mLs 4 times daily 4/22/16   Nick Mcknight MD   albuterol (PROVENTIL) (2.5 MG/3ML) 0.083% nebulizer solution Take 3 mLs by nebulization every 6 hours as needed for Wheezing    Nick Mcknight MD   fluticasone furoate-vilanterol (BREO ELLIPTA) 100-25 MCG/ACT inhaler Inhale 1 puff into the lungs daily  Patient not taking: Reported on 3/28/2025 1/26/24   Carla Chua APRN - CNP   albuterol sulfate HFA (VENTOLIN HFA) 108 (90 Base) MCG/ACT inhaler Inhale 2 puffs into the lungs 4 times daily as needed for Wheezing 9/9/23   Vicente Yee MD   Incontinence Supply Disposable MISC Disposable justin pad to change 2 times a day 7/20/23   Carla Chua APRN - CNP   Incontinence Supplies MISC Disposable incontinence liner pad, to change every 4 hours as needed for urinary incontinence 7/20/23   Carla Chua APRN - CNP   OXYGEN Inhale 3 L into the lungs     Provider, MD Nick        Allergies:     Robitussin [guaifenesin], Codeine, Compazine [prochlorperazine maleate], Fentanyl, Iodides, Morphine, Moxifloxacin, Reglan [metoclopramide], Avelox [moxifloxacin hcl in nacl], Cipro xr, Ofloxacin, and Sulfa antibiotics    Social History:     Tobacco:    reports that she quit smoking about 25 years ago. Her smoking use included cigarettes. She started smoking about 66 years ago. She has a 123 pack-year smoking history. She has never used smokeless tobacco.  Alcohol:      reports no history of alcohol use.  Drug Use:  reports no history of drug use.    Family History:     Family History   Problem Relation Age of Onset    Diabetes Mother     Heart Disease Mother     Cancer Father     Stomach Cancer Father     Emphysema Sister     Diabetes Maternal Grandmother         also aunts and uncles (maternal)    Breast Cancer Maternal Aunt

## 2025-03-29 NOTE — PROGRESS NOTES
Patients home medication of 3 percocet were locked in narcotic box with tamper seal in place with charge nurse odalis. Patient aware.

## 2025-03-29 NOTE — FLOWSHEET NOTE
03/28/25 2235   Treatment Team Notification   Reason for Communication Evaluate   Name of Team Member Notified Amber fitch   Treatment Team Role Advanced Practice Nurse   Method of Communication Secure Message   Response Waiting for response   Notification Time 2236       NP notified of blood sugar of 420, pt request for melatonin, and topomax that is scheduled to start tomorrow night that patient has not taken today

## 2025-03-29 NOTE — CARE COORDINATION
Case Management Assessment  Initial Evaluation    Date/Time of Evaluation: 3/29/2025 11:06 AM  Assessment Completed by: Jennifer Toledo    If patient is discharged prior to next notation, then this note serves as note for discharge by case management.    Patient Name: Mariangel Abreu                   YOB: 1950  Diagnosis: Chest pain [R07.9]                   Date / Time: 3/28/2025  1:07 PM    Patient Admission Status: Inpatient   Readmission Risk (Low < 19, Mod (19-27), High > 27): Readmission Risk Score: 23.9    Current PCP: Nuzhat Ramos DTR  PCP verified by CM? No    Chart Reviewed: Yes      History Provided by: Patient  Patient Orientation: Alert and Oriented    Patient Cognition: Alert    Hospitalization in the last 30 days (Readmission):  No    If yes, Readmission Assessment in CM Navigator will be completed.    Advance Directives:      Code Status: Full Code   Patient's Primary Decision Maker is:      Primary Decision Maker (Active): David Waller - Other Relative - [phone]    Discharge Planning:    Patient lives with: Alone Type of Home: Apartment  Primary Care Giver: Self  Patient Support Systems include: Family Members   Current Financial resources: Medicare  Current community resources: None  Current services prior to admission: C-pap, Home Care            Current DME:              Type of Home Care services:  Aide Services    ADLS  Prior functional level: Independent in ADLs/IADLs  Current functional level: Independent in ADLs/IADLs    PT AM-PAC:   /24  OT AM-PAC:   /24    Family can provide assistance at DC: Yes  Would you like Case Management to discuss the discharge plan with any other family members/significant others, and if so, who? Yes  Plans to Return to Present Housing: Unknown at present  Other Identified Issues/Barriers to RETURNING to current housing: NO  Potential Assistance needed at discharge: Home Care            Potential DME:    Patient expects to discharge to:

## 2025-03-29 NOTE — PROGRESS NOTES
RN notified  that patient has 7/10 chest pain.  said to give one nitro sublingually and wait 20 min. He said if patient chest pain does not resolve to give 30mls of GI cocktail and to call pharmacy to get that made.     1145: patient SBP<100; RN administered midodrine and to reassess BP at 1215 before giving nitro.

## 2025-03-29 NOTE — PLAN OF CARE
Problem: Chronic Conditions and Co-morbidities  Goal: Patient's chronic conditions and co-morbidity symptoms are monitored and maintained or improved  Outcome: Progressing  Flowsheets (Taken 3/29/2025 0240)  Care Plan - Patient's Chronic Conditions and Co-Morbidity Symptoms are Monitored and Maintained or Improved:   Monitor and assess patient's chronic conditions and comorbid symptoms for stability, deterioration, or improvement   Collaborate with multidisciplinary team to address chronic and comorbid conditions and prevent exacerbation or deterioration     Problem: Discharge Planning  Goal: Discharge to home or other facility with appropriate resources  Outcome: Progressing  Flowsheets (Taken 3/29/2025 0240)  Discharge to home or other facility with appropriate resources:   Identify barriers to discharge with patient and caregiver   Arrange for needed discharge resources and transportation as appropriate   Identify discharge learning needs (meds, wound care, etc)   Refer to discharge planning if patient needs post-hospital services based on physician order or complex needs related to functional status, cognitive ability or social support system     Problem: Skin/Tissue Integrity  Goal: Skin integrity remains intact  Description: 1.  Monitor for areas of redness and/or skin breakdown  2.  Assess vascular access sites hourly  3.  Every 4-6 hours minimum:  Change oxygen saturation probe site  4.  Every 4-6 hours:  If on nasal continuous positive airway pressure, respiratory therapy assess nares and determine need for appliance change or resting period  Outcome: Progressing  Flowsheets (Taken 3/29/2025 0240)  Skin Integrity Remains Intact: Monitor for areas of redness and/or skin breakdown  Note: Powder was applied on abd fold and under breast to prevent skin breakdown      Problem: Safety - Adult  Goal: Free from fall injury  Outcome: Progressing  Flowsheets (Taken 3/29/2025 0240)  Free From Fall Injury: Instruct  family/caregiver on patient safety  Note: Patient utilized fall precautions and calls out appropriately for needs

## 2025-03-29 NOTE — PROGRESS NOTES
Spiritual Health History and Assessment/Progress Note  [facility]    (P) Initial Encounter, Spiritual/Emotional Needs,  ,  ,      Name: Mariangel Abreu MRN: [id number]    Age: 74 y.o.     Sex: female   Language: English   Anabaptism: Faith   Chest pain     Date: 3/29/2025            Total Time Calculated: (P) 25 min              Spiritual Assessment began in [facility]        Referral/Consult From: (P) Nurse   Encounter Overview/Reason: (P) Initial Encounter, Spiritual/Emotional Needs  Service Provided For: (P) Patient     visited patient in response to consult.  Patient reports that she has support from extended family and friends.  Patient reports that she talks with others on the phone frequently, even when hospitalized.  Patient reported that she is experiencing grief due to loss of sons but that grandchildren bring her soham and sense of purpose.  Patient reported having a strong prayer life, frequently turning to prayer in coping with life challenges.  Patient declined 's offer to contact local place of [employer]isc[employer].  Patient requested prayer for healing.   provided prayer and indicated that spiritual care services are available as needed or requested.    Nkechi, Belief, Meaning:   Patient identifies as spiritual, is connected with a nkechi tradition or spiritual practice, and has beliefs or practices that help with coping during difficult times  Family/Friends No family/friends present      Importance and Influence:  Patient has no beliefs influential to healthcare decision-making identified during this visit  Family/Friends No family/friends present    Community:  Patient is connected with a spiritual community and feels well-supported. Support system includes: [place] Community, Friends, and Extended family  Family/Friends No family/friends present    Assessment and Plan of Care:     Patient Interventions include: Facilitated expression of thoughts and feelings,

## 2025-03-29 NOTE — H&P
[facility] Internal Medicine  Mejia Kelly MD; Clifford Albrecht MD, Adi Singh MD, Ivana Orellana MD,    Rose Buckley MD, Jhony Hoyos MD.    [facility] Internal Medicine   IN-PATIENT SERVICE   [facility]    HISTORY AND PHYSICAL EXAMINATION            Date:   3/29/2025  Patient name:  Mariangel Abreu  Date of admission:  3/28/2025  1:07 PM  MRN:   [id number]  Account:  [id number]  YOB: 1950  PCP:    Nuzhat Ramos DTR  Room:   2080/2080-01  Code Status:    Full Code    Chief Complaint:     Chief Complaint   Patient presents with    Dizziness    Shortness of Breath       History Obtained From:     Patient/EMR/Bedside RN    History of Present Illness:   Mariangel Abreu is a 74 y.o. Non- / non  female with a history of anxiety, A-fib, CHF, CKD, COPD, diabetes mellitus, HIGINIO, stroke, TIA who presents with Dizziness and Shortness of Breath   and is admitted to the hospital for the management of Chest pain.     According to patient, she has a home health nurse that comes to her home everyday and today her nurse told her that her blood pressure was really high and her blood sugar was 330. She took her morning dose of Lantus and 10 units of Humalog and rechecked her blood sugar an hour later and it read 430. She called her PCP and was instructed to come to the ED. she was discharged from [facility] just a few months ago.     She stated that after going to CT she began to have chest pain and pressure in the mid sternal region, was given 1 dose of 0.4 mg nitro that helped ease her chest pain a little. She complained of leg swelling and weakness that has caused her to fall numerous times at home recently.      Upon assessment patient alert and oriented x 4, pupils equal, round and reactive. Lung sounds clear, currently using 2 L NC. Heart sounds with S1 and S2 sounds noted no murmurs or gallops noted. Abdomen is soft with no tenderness  Grandmother         also aunts and uncles (maternal)    Breast Cancer Maternal Aunt        Review of Systems:     Positive and Negative as described in HPI.        Physical Exam:   /69   Pulse 69   Temp 98.1 °F (36.7 °C) (Oral)   Resp 18   Ht 1.61 m (5' 3.39\")   Wt 90.1 kg (198 lb 10.2 oz)   LMP  (LMP Unknown)   SpO2 94%   BMI 34.76 kg/m²   Temp (24hrs), Av.8 °F (36.6 °C), Min:96.8 °F (36 °C), Max:98.4 °F (36.9 °C)    Recent Labs     25  1610 25  2152 25  0638   POCGLU 143* 420* 185*       Intake/Output Summary (Last 24 hours) at 3/29/2025 0946  Last data filed at 3/29/2025 0528  Gross per 24 hour   Intake 240 ml   Output 300 ml   Net -60 ml       General Appearance: alert, well appearing, and in no acute distress  Mental status: oriented to person, place, and time  Neck: supple, no carotid bruits, thyroid not palpable  Lungs: Bilateral equal air entry, clear to ausculation, no wheezing, rales or rhonchi, normal effort  Cardiovascular: normal rate, regular rhythm, no murmur, gallop, rub  Abdomen: Soft, nontender, nondistended, normal bowel sounds, no hepatomegaly or splenomegaly  Neurologic: There are no new focal motor or sensory deficits, normal muscle tone and bulk, no abnormal sensation, normal speech, cranial nerves II through XII grossly intact  Skin: No gross lesions, rashes, bruising or bleeding on exposed skin area  Extremities: peripheral pulses palpable, no pedal edema or calf pain with palpation  Psych: normal affect    Investigations:      Laboratory Testing:  Recent Results (from the past 24 hours)   Comprehensive Metabolic Panel    Collection Time: 25  1:19 PM   Result Value Ref Range    Sodium 135 (L) 136 - 145 mmol/L    Potassium 4.1 3.7 - 5.3 mmol/L    Chloride 98 98 - 107 mmol/L    CO2 23 20 - 31 mmol/L    Anion Gap 14 9 - 16 mmol/L    Glucose 387 (H) 74 - 99 mg/dL    BUN 19 8 - 23 mg/dL    Creatinine 1.3 (H) 0.7 - 1.2 mg/dL    Est, Glom Filt Rate 43 (L)

## 2025-03-29 NOTE — CONSULTS
mouth 2 times daily  metFORMIN (GLUCOPHAGE-XR) 500 MG extended release tablet, Take 1 tablet by mouth daily (with breakfast)  ranolazine (RANEXA) 500 MG extended release tablet, Take 1 tablet by mouth 2 times daily  isosorbide mononitrate (IMDUR) 30 MG extended release tablet, Take 2 tablets by mouth daily  ticagrelor (BRILINTA) 90 MG TABS tablet, TAKE 1 TABLET BY MOUTH 2 TIMES DAILY  alogliptin (NESINA) 12.5 MG TABS tablet, Take 1 tablet by mouth daily  topiramate (TOPAMAX) 100 MG tablet, GIVE 1 TABLET BY MOUTH ONE TIME A DAY FOR SEIZURES  oxyBUTYnin (DITROPAN-XL) 5 MG extended release tablet, GIVE 1 TABLET BY MOUTH ONE TIME A DAY FOR BLADDER SPASM  midodrine (PROAMATINE) 2.5 MG tablet, GIVE 1 TABLET BY MOUTH WITH MEALS FOR HYPOTENSION ** HOLD IF SBP IS GREATER THAN 110  escitalopram (LEXAPRO) 20 MG tablet, GIVE 1 TABLET BY MOUTH ONE TIME A DAY FOR DEPRESSION  docusate sodium (COLACE) 100 MG capsule, Take 1 capsule by mouth 2 times daily Take 1 pill twice a day as needed for constipation  aspirin 81 MG EC tablet, Take 1 tablet by mouth daily  omeprazole (PRILOSEC) 20 MG delayed release capsule, Take 1 capsule by mouth in the morning and at bedtime (Patient not taking: Reported on 3/28/2025)  dapagliflozin (FARXIGA) 5 MG tablet, Take 1 tablet by mouth every morning  insulin glargine (LANTUS SOLOSTAR) 100 UNIT/ML injection pen, INJECT 50 UNITS INTO THE SKIN EVERY MORNING AND 45 UNITS NIGHTLY. (Patient taking differently: INJECT 50 UNITS INTO THE SKIN EVERY MORNING AND 40 UNITS NIGHTLY.)  budesonide-formoterol (SYMBICORT) 160-4.5 MCG/ACT AERO, INHALE 2 PUFFS INTO THE LUNGS 2 TIMES DAILY AS NEEDED FOR FOR SHORTNESS OF BREATH  insulin aspart (NOVOLOG FLEXPEN) 100 UNIT/ML injection pen, 5 units with each meal plus IF<139 NO INSULIN; 140-199-4 UN;200-249-8 UN;250-299-10 UN;300-349-12 UN;350-400=16 UN;ABOVE 400: 20 UNIT(S) (Patient taking differently: Inject 5-17 Units into the skin 3 times daily (before meals) 5 units  03/29/2025 05:26 AM    PHOS 3.1 11/02/2024 10:04 AM       LFTS  Lab Results   Component Value Date/Time    ALKPHOS 79 03/28/2025 01:19 PM    ALT 9 03/28/2025 01:19 PM    AST 16 03/28/2025 01:19 PM    BILITOT 0.3 03/28/2025 01:19 PM    BILIDIR 0.1 01/11/2025 04:15 PM    IBILI 0.2 01/11/2025 04:15 PM       INR  No results for input(s): \"PROTIME\", \"INR\" in the last 72 hours.    APTT  No results for input(s): \"APTT\" in the last 72 hours.    Lactic Acid  Lab Results   Component Value Date/Time    LACTA 2.0 12/09/2022 06:59 PM    LACTA NOT REPORTED 10/23/2019 06:50 PM    LACTA 1.1 09/30/2018 05:37 PM        PRO-BNP   Recent Labs     03/28/25  1319   PROBNP 271           ABGs:   Lab Results   Component Value Date/Time    PO2ART 61.3 11/21/2011 08:35 AM       Lab Results   Component Value Date/Time    MODE NOT REPORTED 10/06/2021 02:11 PM         Impression  Patient presented with chest discomfort.  Concern for cardiac history of coronary disease history of 4 stents placed.  Most recent cardiac cath with stent placement in March 2024.  Diagnosis of COPD.Patient was last seen in our office November 8, 2023.Patient was last seen in our office November 8, 2023.  Most recent lung function test  January 26, 2021 January 26, 2021 FEV1 0.67 which is 34% predicted.  Patient was not able to do the D  Patient was not able to do the DLCO..  She was formerly on 2 L nasal cannula upon exertion and at night she she is currently on 2 L continuously at home  Chronic respiratory failure with hypoxemia 2 L nasal cannula continuously  Diagnosis of HIGINIO. Sleep study performed sleep study performed November 2014 AHI mild at 5.7 thought to be underestimated the true degree of apnea.  She is not compliant with her machine at this time  CT scan of chest reveals no acute process.  Significant tobacco history but quitting in year 2000  Full resuscitation.  Plan:      Will continue DuoNebs for now  On Symbicort  On DVT prophylaxis  Cardiology has been

## 2025-03-30 LAB
GLUCOSE BLD-MCNC: 138 MG/DL (ref 65–105)
GLUCOSE BLD-MCNC: 182 MG/DL (ref 65–105)
GLUCOSE BLD-MCNC: 209 MG/DL (ref 65–105)
GLUCOSE BLD-MCNC: 240 MG/DL (ref 65–105)
MAGNESIUM SERPL-MCNC: 1.9 MG/DL (ref 1.6–2.4)

## 2025-03-30 PROCEDURE — 6370000000 HC RX 637 (ALT 250 FOR IP)

## 2025-03-30 PROCEDURE — 83735 ASSAY OF MAGNESIUM: CPT

## 2025-03-30 PROCEDURE — 2700000000 HC OXYGEN THERAPY PER DAY

## 2025-03-30 PROCEDURE — 97166 OT EVAL MOD COMPLEX 45 MIN: CPT

## 2025-03-30 PROCEDURE — 6370000000 HC RX 637 (ALT 250 FOR IP): Performed by: NURSE PRACTITIONER

## 2025-03-30 PROCEDURE — 2500000003 HC RX 250 WO HCPCS

## 2025-03-30 PROCEDURE — 6370000000 HC RX 637 (ALT 250 FOR IP): Performed by: INTERNAL MEDICINE

## 2025-03-30 PROCEDURE — 94640 AIRWAY INHALATION TREATMENT: CPT

## 2025-03-30 PROCEDURE — 2060000000 HC ICU INTERMEDIATE R&B

## 2025-03-30 PROCEDURE — 94660 CPAP INITIATION&MGMT: CPT

## 2025-03-30 PROCEDURE — 94761 N-INVAS EAR/PLS OXIMETRY MLT: CPT

## 2025-03-30 PROCEDURE — 97535 SELF CARE MNGMENT TRAINING: CPT

## 2025-03-30 PROCEDURE — 97530 THERAPEUTIC ACTIVITIES: CPT

## 2025-03-30 PROCEDURE — 6360000002 HC RX W HCPCS

## 2025-03-30 PROCEDURE — 99233 SBSQ HOSP IP/OBS HIGH 50: CPT | Performed by: INTERNAL MEDICINE

## 2025-03-30 PROCEDURE — 36415 COLL VENOUS BLD VENIPUNCTURE: CPT

## 2025-03-30 PROCEDURE — 82947 ASSAY GLUCOSE BLOOD QUANT: CPT

## 2025-03-30 RX ADMIN — INSULIN GLARGINE 50 UNITS: 100 INJECTION, SOLUTION SUBCUTANEOUS at 08:33

## 2025-03-30 RX ADMIN — PANTOPRAZOLE SODIUM 40 MG: 40 TABLET, DELAYED RELEASE ORAL at 21:33

## 2025-03-30 RX ADMIN — DOCUSATE SODIUM 100 MG: 100 CAPSULE, LIQUID FILLED ORAL at 08:34

## 2025-03-30 RX ADMIN — RANOLAZINE 500 MG: 500 TABLET, EXTENDED RELEASE ORAL at 08:34

## 2025-03-30 RX ADMIN — Medication 2 PUFF: at 18:44

## 2025-03-30 RX ADMIN — ISOSORBIDE MONONITRATE 60 MG: 60 TABLET, EXTENDED RELEASE ORAL at 08:34

## 2025-03-30 RX ADMIN — IPRATROPIUM BROMIDE AND ALBUTEROL SULFATE 1 DOSE: .5; 2.5 SOLUTION RESPIRATORY (INHALATION) at 18:37

## 2025-03-30 RX ADMIN — INSULIN LISPRO 4 UNITS: 100 INJECTION, SOLUTION INTRAVENOUS; SUBCUTANEOUS at 21:28

## 2025-03-30 RX ADMIN — SODIUM CHLORIDE, PRESERVATIVE FREE 10 ML: 5 INJECTION INTRAVENOUS at 08:34

## 2025-03-30 RX ADMIN — TOPIRAMATE 100 MG: 100 TABLET, FILM COATED ORAL at 21:33

## 2025-03-30 RX ADMIN — INSULIN LISPRO 4 UNITS: 100 INJECTION, SOLUTION INTRAVENOUS; SUBCUTANEOUS at 12:17

## 2025-03-30 RX ADMIN — DOCUSATE SODIUM 100 MG: 100 CAPSULE, LIQUID FILLED ORAL at 21:33

## 2025-03-30 RX ADMIN — MICONAZOLE NITRATE: 20 POWDER TOPICAL at 08:35

## 2025-03-30 RX ADMIN — ASPIRIN 81 MG: 81 TABLET, COATED ORAL at 08:34

## 2025-03-30 RX ADMIN — PANTOPRAZOLE SODIUM 40 MG: 40 TABLET, DELAYED RELEASE ORAL at 08:34

## 2025-03-30 RX ADMIN — OXYBUTYNIN CHLORIDE 5 MG: 5 TABLET, EXTENDED RELEASE ORAL at 21:33

## 2025-03-30 RX ADMIN — ALOGLIPTIN 12.5 MG: 12.5 TABLET, FILM COATED ORAL at 08:34

## 2025-03-30 RX ADMIN — INSULIN GLARGINE 45 UNITS: 100 INJECTION, SOLUTION SUBCUTANEOUS at 21:28

## 2025-03-30 RX ADMIN — Medication 2 PUFF: at 07:39

## 2025-03-30 RX ADMIN — ENOXAPARIN SODIUM 40 MG: 100 INJECTION SUBCUTANEOUS at 08:34

## 2025-03-30 RX ADMIN — TICAGRELOR 90 MG: 90 TABLET ORAL at 21:33

## 2025-03-30 RX ADMIN — OXYCODONE HYDROCHLORIDE AND ACETAMINOPHEN 1 TABLET: 5; 325 TABLET ORAL at 21:31

## 2025-03-30 RX ADMIN — ESCITALOPRAM OXALATE 20 MG: 20 TABLET, FILM COATED ORAL at 21:33

## 2025-03-30 RX ADMIN — Medication 3 MG: at 23:57

## 2025-03-30 RX ADMIN — IPRATROPIUM BROMIDE AND ALBUTEROL SULFATE 1 DOSE: .5; 2.5 SOLUTION RESPIRATORY (INHALATION) at 07:39

## 2025-03-30 RX ADMIN — IPRATROPIUM BROMIDE AND ALBUTEROL SULFATE 1 DOSE: .5; 2.5 SOLUTION RESPIRATORY (INHALATION) at 15:37

## 2025-03-30 RX ADMIN — EMPAGLIFLOZIN 10 MG: 10 TABLET, FILM COATED ORAL at 08:34

## 2025-03-30 RX ADMIN — SODIUM CHLORIDE, PRESERVATIVE FREE 10 ML: 5 INJECTION INTRAVENOUS at 21:38

## 2025-03-30 RX ADMIN — METOPROLOL TARTRATE 12.5 MG: 25 TABLET, FILM COATED ORAL at 21:33

## 2025-03-30 RX ADMIN — TICAGRELOR 90 MG: 90 TABLET ORAL at 08:34

## 2025-03-30 RX ADMIN — MICONAZOLE NITRATE: 20 POWDER TOPICAL at 21:37

## 2025-03-30 RX ADMIN — ATORVASTATIN CALCIUM 40 MG: 40 TABLET, FILM COATED ORAL at 08:34

## 2025-03-30 RX ADMIN — METOPROLOL TARTRATE 12.5 MG: 25 TABLET, FILM COATED ORAL at 08:34

## 2025-03-30 RX ADMIN — RANOLAZINE 500 MG: 500 TABLET, EXTENDED RELEASE ORAL at 21:33

## 2025-03-30 RX ADMIN — IPRATROPIUM BROMIDE AND ALBUTEROL SULFATE 1 DOSE: .5; 2.5 SOLUTION RESPIRATORY (INHALATION) at 11:24

## 2025-03-30 ASSESSMENT — PAIN DESCRIPTION - ORIENTATION
ORIENTATION: MID;LOWER
ORIENTATION: MID;LOWER

## 2025-03-30 ASSESSMENT — PAIN DESCRIPTION - DESCRIPTORS
DESCRIPTORS: ACHING
DESCRIPTORS: ACHING;CRAMPING

## 2025-03-30 ASSESSMENT — PAIN DESCRIPTION - LOCATION
LOCATION: BACK
LOCATION: BACK

## 2025-03-30 ASSESSMENT — PAIN SCALES - GENERAL
PAINLEVEL_OUTOF10: 8
PAINLEVEL_OUTOF10: 5
PAINLEVEL_OUTOF10: 8

## 2025-03-30 NOTE — CARE COORDINATION
ONGOING DISCHARGE  NOTE:      Writer reviewed notes, Patient is agreeable to discharge to [facility]  [address] [place] [address]  Phone [phone]  Fax [phone]  Evening fax [phone]       Discharge is pending pre cert.     Once patient is accepted will need a pre cert started.    [facility]    Cardiology consult    Cardiac cath scheduled for Monday 3/31    Will continue to follow for additional discharge needs.     If patient is discharged prior to next notation, then this note serves as note for discharge by case management.    Electronically signed by Jennifer Toledo on 3/30/2025 at 8:25 AM

## 2025-03-30 NOTE — CONSULTS
[facility]  Inpatient Cardiology Consult             Date:   3/29/25  Patient name: Mariangel Abreu  Date of admission:  3/28/2025  1:07 PM  MRN:   [id number]  YOB: 1950      Reason for Admission:  Chest pain    CHIEF COMPLAINT:  Chest pain     History Obtained From:  Patient and medical record    HISTORY OF PRESENT ILLNESS:      The patient is a 74 y.o woman with h/o COPD, HIGINIO, PAF, HTN, DM, CKD and CAD s/p inferior STEMI with PCI to RCA in Feb 2024, followed by staged PCI to the LAD in March 2024.  She was admitted last month with lightheadedness and syncope, and was readmitted yesterday for recurrent substernal chest pain. She had one episode earlier today relieved after SL NTG and GI cocktail.  Lexiscan stress test in January did show anteroapical reversible defect c/w ischemia.  She has had some lightheadedness however no recurrent syncope.  She denies any nausea, diaphoresis or SOB.      Past Medical History:   has a past medical history of Abdominal bloating, Adenomatous polyp of ascending colon, Allergic rhinitis, Anxiety, Arthritis, Asthma, Atrial fibrillation ([facility]), Back pain, Benign hypertension with CKD (chronic kidney disease) stage III ([facility]), CAD (coronary artery disease), Caffeine use, CHF (congestive heart failure) ([facility]ly [facility]), Chronic kidney disease, CKD (chronic kidney disease) stage 3, GFR 30-59 ml/min ([facility]), Claudication, COPD (chronic obstructive pulmonary disease) ([facility]), Degeneration of lumbar or lumbosacral intervertebral disc, Depression, DM (diabetes mellitus) ([facility]), Emphysema of lung ([facility]), Gastritis, GERD (gastroesophageal reflux disease), Hearing loss, Hematuria, Hiatal hernia, History of loop recorder, HTN (hypertension), benign, Hypertriglyceridemia, Incontinence of urine, Irritable bowel syndrome with both constipation and diarrhea, Kidney stone, Knee arthropathy, Long term (current) use of anticoagulants, Lumbosacral spondylosis without myelopathy,  tablet Take 1 tablet by mouth 2 times daily 10/7/24  Yes Riley Fox MD   isosorbide mononitrate (IMDUR) 30 MG extended release tablet Take 2 tablets by mouth daily 10/7/24  Yes Riley Fox MD   ticagrelor (BRILINTA) 90 MG TABS tablet TAKE 1 TABLET BY MOUTH 2 TIMES DAILY 7/29/24  Yes Carla Chua APRN - CNP   alogliptin (NESINA) 12.5 MG TABS tablet Take 1 tablet by mouth daily 7/26/24  Yes Carla Chua APRN - CNP   topiramate (TOPAMAX) 100 MG tablet GIVE 1 TABLET BY MOUTH ONE TIME A DAY FOR SEIZURES 6/3/24  Yes Carla Chua APRN - CNP   oxyBUTYnin (DITROPAN-XL) 5 MG extended release tablet GIVE 1 TABLET BY MOUTH ONE TIME A DAY FOR BLADDER SPASM 6/3/24  Yes Carla Chua APRN - CNP   midodrine (PROAMATINE) 2.5 MG tablet GIVE 1 TABLET BY MOUTH WITH MEALS FOR HYPOTENSION ** HOLD IF SBP IS GREATER THAN 110 6/3/24  Yes Carla Chua APRN - CNP   escitalopram (LEXAPRO) 20 MG tablet GIVE 1 TABLET BY MOUTH ONE TIME A DAY FOR DEPRESSION 6/3/24  Yes Carla Chua APRN - CNP   docusate sodium (COLACE) 100 MG capsule Take 1 capsule by mouth 2 times daily Take 1 pill twice a day as needed for constipation 4/17/24  Yes Carla Chua APRN - CNP   aspirin 81 MG EC tablet Take 1 tablet by mouth daily 3/13/24  Yes Victoriano Combs MD   omeprazole (PRILOSEC) 20 MG delayed release capsule Take 1 capsule by mouth in the morning and at bedtime  Patient not taking: Reported on 3/28/2025    ProviderNick MD   dapagliflozin (FARXIGA) 5 MG tablet Take 1 tablet by mouth every morning    Nick Mcknight MD   insulin glargine (LANTUS SOLOSTAR) 100 UNIT/ML injection pen INJECT 50 UNITS INTO THE SKIN EVERY MORNING AND 45 UNITS NIGHTLY.  Patient taking differently: INJECT 50 UNITS INTO THE SKIN EVERY MORNING AND 40 UNITS NIGHTLY. 2/11/25 2/11/26  Carla Chua, APRN - CNP   budesonide-formoterol (SYMBICORT) 160-4.5 MCG/ACT AERO INHALE 2 PUFFS INTO THE LUNGS 2 TIMES DAILY AS NEEDED

## 2025-03-30 NOTE — PLAN OF CARE
Problem: Chronic Conditions and Co-morbidities  Goal: Patient's chronic conditions and co-morbidity symptoms are monitored and maintained or improved  3/30/2025 1456 by Eduarda Engle RN  Outcome: Progressing     Problem: Discharge Planning  Goal: Discharge to home or other facility with appropriate resources  3/30/2025 1456 by Eduarda Engle RN  Outcome: Progressing     Problem: Skin/Tissue Integrity  Goal: Skin integrity remains intact  Description: 1.  Monitor for areas of redness and/or skin breakdown  2.  Assess vascular access sites hourly  3.  Every 4-6 hours minimum:  Change oxygen saturation probe site  4.  Every 4-6 hours:  If on nasal continuous positive airway pressure, respiratory therapy assess nares and determine need for appliance change or resting period  3/30/2025 1456 by Eduarda Engle RN  Outcome: Progressing     Problem: Safety - Adult  Goal: Free from fall injury  3/30/2025 1456 by Eduarda Engle RN  Outcome: Progressing     Problem: Pain  Goal: Verbalizes/displays adequate comfort level or baseline comfort level  3/30/2025 1456 by Eduarda Engle RN  Outcome: Progressing     Problem: ABCDS Injury Assessment  Goal: Absence of physical injury  3/30/2025 1456 by Eduarda Engle RN  Outcome: Progressing     Problem: Nutrition Deficit:  Goal: Optimize nutritional status  3/30/2025 1456 by Eduarda Engle RN  Outcome: Progressing

## 2025-03-30 NOTE — PROGRESS NOTES
[facility]   Occupational Therapy Evaluation  Date: 3/30/25  Patient Name: Mariangel Abreu       Room:   MRN: [id number]  Account: [id number]   : 1950  (74 y.o.) Gender: female     Discharge Recommendations:  Further Occupational Therapy is recommended upon facility discharge.    OT Equipment Recommendations  Other: TBD- has sock aid, reacher, rollator, shower chair, electric w/c    Referring Practitioner: Babs Hitchcock APRN - NP  Diagnosis: Chest pain        Treatment Diagnosis: impaired selfcare status    Past Medical History:  has a past medical history of Abdominal bloating, Adenomatous polyp of ascending colon, Allergic rhinitis, Anxiety, Arthritis, Asthma, Atrial fibrillation (Formerly [facility]), Back pain, Benign hypertension with CKD (chronic kidney disease) stage III ([facility]), CAD (coronary artery disease), Caffeine use, CHF (congestive heart failure) ([facility]), Chronic kidney disease, CKD (chronic kidney disease) stage 3, GFR 30-59 ml/min ([facility]), Claudication, COPD (chronic obstructive pulmonary disease) ([facility]), Degeneration of lumbar or lumbosacral intervertebral disc, Depression, DM (diabetes mellitus) ([facility]), Emphysema of lung ([facility]), Gastritis, GERD (gastroesophageal reflux disease), Hearing loss, Hematuria, Hiatal hernia, History of loop recorder, HTN (hypertension), benign, Hypertriglyceridemia, Incontinence of urine, Irritable bowel syndrome with both constipation and diarrhea, Kidney stone, Knee arthropathy, Long term (current) use of anticoagulants, Lumbosacral spondylosis without myelopathy, Migraine headache, Mumps, Neuropathy, Obesity, On home oxygen therapy, HIGINIO on CPAP, Otitis media, Secondary diabetes mellitus with stage 3 chronic kidney disease (GFR 30-59) ([facility]), STEMI (ST elevation myocardial infarction) ([facility]), Stroke ([facility]), TIA (transient ischemic attack), Tinnitus, Type 2 diabetes mellitus without complication ([facility]), Type II or unspecified type diabetes

## 2025-03-30 NOTE — PLAN OF CARE
Problem: Chronic Conditions and Co-morbidities  Goal: Patient's chronic conditions and co-morbidity symptoms are monitored and maintained or improved  3/30/2025 0325 by Vika Garcia RN  Outcome: Progressing  Flowsheets (Taken 3/29/2025 0856 by Daisha Garcia, RN)  Care Plan - Patient's Chronic Conditions and Co-Morbidity Symptoms are Monitored and Maintained or Improved: Monitor and assess patient's chronic conditions and comorbid symptoms for stability, deterioration, or improvement     Problem: Discharge Planning  Goal: Discharge to home or other facility with appropriate resources  3/30/2025 0325 by Vika Garcia RN  Outcome: Progressing  Flowsheets (Taken 3/29/2025 0856 by Daisha Garcai, RN)  Discharge to home or other facility with appropriate resources: Identify barriers to discharge with patient and caregiver     Problem: Skin/Tissue Integrity  Goal: Skin integrity remains intact  Description: 1.  Monitor for areas of redness and/or skin breakdown  2.  Assess vascular access sites hourly  3.  Every 4-6 hours minimum:  Change oxygen saturation probe site  4.  Every 4-6 hours:  If on nasal continuous positive airway pressure, respiratory therapy assess nares and determine need for appliance change or resting period  3/30/2025 0325 by Vkia Garcia RN  Outcome: Progressing  Flowsheets (Taken 3/29/2025 1222 by Daisha Garcia, RN)  Skin Integrity Remains Intact: Monitor for areas of redness and/or skin breakdown  Note: Skin assessment complete. Waffle mattress in place. Coccyx reddened. Sensicare applied PRN. Turned and repositioned every two hours.  Area kept free from moisture. Proper nourishment and fluids encouraged, as appropriate. Will continue to monitor for additional needs and changes in skin breakdown.       Problem: Safety - Adult  Goal: Free from fall injury  3/30/2025 0325 by Vika Garcia RN  Outcome: Progressing  Note: No falls noted this shift.

## 2025-03-30 NOTE — H&P (VIEW-ONLY)
[facility]  Inpatient Cardiology Consult             Date:   3/29/25  Patient name: Mariangel Abreu  Date of admission:  3/28/2025  1:07 PM  MRN:   [id number]  YOB: 1950      Reason for Admission:  Chest pain    CHIEF COMPLAINT:  Chest pain     History Obtained From:  Patient and medical record    HISTORY OF PRESENT ILLNESS:      The patient is a 74 y.o woman with h/o COPD, HIGINIO, PAF, HTN, DM, CKD and CAD s/p inferior STEMI with PCI to RCA in Feb 2024, followed by staged PCI to the LAD in March 2024.  She was admitted last month with lightheadedness and syncope, and was readmitted yesterday for recurrent substernal chest pain. She had one episode earlier today relieved after SL NTG and GI cocktail.  Lexiscan stress test in January did show anteroapical reversible defect c/w ischemia.  She has had some lightheadedness however no recurrent syncope.  She denies any nausea, diaphoresis or SOB.      Past Medical History:   has a past medical history of Abdominal bloating, Adenomatous polyp of ascending colon, Allergic rhinitis, Anxiety, Arthritis, Asthma, Atrial fibrillation ([facility]), Back pain, Benign hypertension with CKD (chronic kidney disease) stage III ([facility]), CAD (coronary artery disease), Caffeine use, CHF (congestive heart failure) ([facility]), Chronic kidney disease, CKD (chronic kidney disease) stage 3, GFR 30-59 ml/min ([facility]), Claudication, COPD (chronic obstructive pulmonary disease) ([facility]), Degeneration of lumbar or lumbosacral intervertebral disc, Depression, DM (diabetes mellitus) ([facility]), Emphysema of lung ([facility]), Gastritis, GERD (gastroesophageal reflux disease), Hearing loss, Hematuria, Hiatal hernia, History of loop recorder, HTN (hypertension), benign, Hypertriglyceridemia, Incontinence of urine, Irritable bowel syndrome with both constipation and diarrhea, Kidney stone, Knee arthropathy, Long term (current) use of anticoagulants, Lumbosacral spondylosis without myelopathy,  No temperature intolerance. No excessive thirst, fluid intake, or urination. No tremor.  Hematologic/Lymphatic: No abnormal bruising or bleeding, blood clots or swollen lymph nodes.  Allergic/Immunologic: No nasal congestion or hives.    PHYSICAL EXAM:    Physical Examination:    BP (!) 120/54   Pulse 58   Temp 97.5 °F (36.4 °C) (Oral)   Resp 18   Ht 1.61 m (5' 3.39\")   Wt 89.5 kg (197 lb 5 oz)   LMP  (LMP Unknown)   SpO2 98%   BMI 34.53 kg/m²    Constitutional and General Appearance: alert, cooperative, no distress and appears stated age  HEENT: PERRL, no cervical lymphadenopathy. No masses palpable. Normal oral mucosa  Respiratory:  Normal excursion and expansion without use of accessory muscles  Resp Auscultation: Good respiratory effort. No for increased work of breathing. On auscultation: clear to auscultation bilaterally  Cardiovascular:  The apical impulse is not displaced  Heart tones are crisp and normal. regular S1 and S2. Murmurs:  Jugular venous pulsation Normal  The carotid upstroke is normal in amplitude and contour without delay or bruit  Peripheral pulses are symmetrical and full   Abdomen:  No masses or tenderness  Bowel sounds present  Extremities:   No Cyanosis or Clubbing   Lower extremity edema: Yes   Skin: Warm and dry  Neurological:  Alert and oriented.  Moves all extremities well  No abnormalities of mood, affect, memory, mentation, or behavior are noted    DATA:    Diagnostics:      EKG:   Sinus rhythm, 73 bpm; inferior infarct; non-specific ST and T wave abnormality      CARDIAC CATHETERIZATION ( 3/11/24)    LAD: 80% long proximal stenosis heavily calcified, length is 34 mm, GELACIO-3 flow, underwent shockwave with 2.5 mm balloon followed by stenting with 3.0 x 38 mm Olympia stent with 0% residual stenosis and GELACIO-3 flow     Mid LAD has 80% stenosis length is 8 mm, GELACIO-3 flow, underwent ELEANOR using 2.25 x 12 mm Syed stent with 0% residual stenosis and GELACIO-3 flow

## 2025-03-30 NOTE — PROGRESS NOTES
[facility]  Mejia Kelly MD; Clifford Albrecht MD, Adi Singh MD, Ivana Orellana MD,    Rose Buckley MD, Jhony Hoyos MD.    [facility]   IN-PATIENT SERVICE   [facility]    Progress note            Date:   3/30/2025  Patient name:  Mariangel Abreu  Date of admission:  3/28/2025  1:07 PM  MRN:   [id number]  Account:  [id number]  YOB: 1950  PCP:    Nuzhat Ramos DTR  Room:   2080/2080-01  Code Status:    Full Code    Chief Complaint:     Chief Complaint   Patient presents with    Dizziness    Shortness of Breath       History Obtained From:     Patient/EMR/Bedside RN    History of Present Illness:   Mariangel Abreu is a 74 y.o. Non- / non  female with a history of anxiety, A-fib, CHF, CKD, COPD, diabetes mellitus, HIGINIO, stroke, TIA who presents with Dizziness and Shortness of Breath   and is admitted to the hospital for the management of Chest pain.     According to patient, she has a home health nurse that comes to her home everyday and today her nurse told her that her blood pressure was really high and her blood sugar was 330. She took her morning dose of Lantus and 10 units of Humalog and rechecked her blood sugar an hour later and it read 430. She called her PCP and was instructed to come to the ED. she was discharged from [facility] just a few months ago.     She stated that after going to CT she began to have chest pain and pressure in the mid sternal region, was given 1 dose of 0.4 mg nitro that helped ease her chest pain a little. She complained of leg swelling and weakness that has caused her to fall numerous times at home recently.      Upon assessment patient alert and oriented x 4, pupils equal, round and reactive. Lung sounds clear, currently using 2 L NC. Heart sounds with S1 and S2 sounds noted no murmurs or gallops noted. Abdomen is soft with no tenderness or guarding present  requirement for current medical therapies and most importantly because of direct risk to patient if care not provided in a hospital setting.  Expected length of stay > 48 hours.    Rose Buckley MD  3/30/2025  3:22 PM    Copy sent to Nuzhat Meehan, DTR    Please note that this chart was generated using voice recognition Dragon dictation software.  Although every effort was made to ensure the accuracy of this automated transcription, some errors in transcription may have occurred.

## 2025-03-31 ENCOUNTER — HOSPITAL ENCOUNTER (OUTPATIENT)
Age: 75
Setting detail: OUTPATIENT SURGERY
Discharge: ANOTHER HOSPITAL | End: 2025-03-31
Attending: INTERNAL MEDICINE | Admitting: INTERNAL MEDICINE
Payer: MEDICARE

## 2025-03-31 ENCOUNTER — HOSPITAL ENCOUNTER (INPATIENT)
Age: 75
LOS: 1 days | Discharge: HOME | DRG: 302 | End: 2025-04-01
Attending: INTERNAL MEDICINE
Payer: MEDICARE

## 2025-03-31 VITALS
HEART RATE: 67 BPM | SYSTOLIC BLOOD PRESSURE: 98 MMHG | RESPIRATION RATE: 19 BRPM | DIASTOLIC BLOOD PRESSURE: 67 MMHG | OXYGEN SATURATION: 93 %

## 2025-03-31 VITALS
TEMPERATURE: 97.5 F | OXYGEN SATURATION: 96 % | SYSTOLIC BLOOD PRESSURE: 122 MMHG | HEIGHT: 63 IN | WEIGHT: 206.35 LBS | HEART RATE: 62 BPM | DIASTOLIC BLOOD PRESSURE: 68 MMHG | BODY MASS INDEX: 36.56 KG/M2 | RESPIRATION RATE: 18 BRPM

## 2025-03-31 DIAGNOSIS — I20.0 UNSTABLE ANGINA (HCC): ICD-10-CM

## 2025-03-31 DIAGNOSIS — N18.30 STAGE 3 CHRONIC KIDNEY DISEASE, UNSPECIFIED WHETHER STAGE 3A OR 3B CKD (HCC): ICD-10-CM

## 2025-03-31 DIAGNOSIS — Z95.5 S/P CORONARY ARTERY STENT PLACEMENT: Primary | ICD-10-CM

## 2025-03-31 PROBLEM — Z98.890 S/P CARDIAC CATHETERIZATION: Status: ACTIVE | Noted: 2025-03-31

## 2025-03-31 LAB
EKG ATRIAL RATE: 73 BPM
EKG ATRIAL RATE: 79 BPM
EKG ATRIAL RATE: 89 BPM
EKG P AXIS: 52 DEGREES
EKG P AXIS: 58 DEGREES
EKG P AXIS: 73 DEGREES
EKG P-R INTERVAL: 134 MS
EKG P-R INTERVAL: 140 MS
EKG P-R INTERVAL: 146 MS
EKG Q-T INTERVAL: 340 MS
EKG Q-T INTERVAL: 362 MS
EKG Q-T INTERVAL: 374 MS
EKG QRS DURATION: 68 MS
EKG QRS DURATION: 70 MS
EKG QRS DURATION: 72 MS
EKG QTC CALCULATION (BAZETT): 412 MS
EKG QTC CALCULATION (BAZETT): 413 MS
EKG QTC CALCULATION (BAZETT): 415 MS
EKG R AXIS: -46 DEGREES
EKG R AXIS: -48 DEGREES
EKG R AXIS: -56 DEGREES
EKG T AXIS: 73 DEGREES
EKG T AXIS: 90 DEGREES
EKG T AXIS: 90 DEGREES
EKG VENTRICULAR RATE: 73 BPM
EKG VENTRICULAR RATE: 79 BPM
EKG VENTRICULAR RATE: 89 BPM
GLUCOSE BLD-MCNC: 153 MG/DL (ref 65–105)
GLUCOSE BLD-MCNC: 244 MG/DL (ref 65–105)
GLUCOSE BLD-MCNC: 88 MG/DL (ref 65–105)

## 2025-03-31 PROCEDURE — 76937 US GUIDE VASCULAR ACCESS: CPT | Performed by: INTERNAL MEDICINE

## 2025-03-31 PROCEDURE — 94664 DEMO&/EVAL PT USE INHALER: CPT

## 2025-03-31 PROCEDURE — 99152 MOD SED SAME PHYS/QHP 5/>YRS: CPT | Performed by: INTERNAL MEDICINE

## 2025-03-31 PROCEDURE — 6360000004 HC RX CONTRAST MEDICATION: Performed by: INTERNAL MEDICINE

## 2025-03-31 PROCEDURE — 94761 N-INVAS EAR/PLS OXIMETRY MLT: CPT

## 2025-03-31 PROCEDURE — C1769 GUIDE WIRE: HCPCS | Performed by: INTERNAL MEDICINE

## 2025-03-31 PROCEDURE — 6370000000 HC RX 637 (ALT 250 FOR IP)

## 2025-03-31 PROCEDURE — 93010 ELECTROCARDIOGRAM REPORT: CPT | Performed by: INTERNAL MEDICINE

## 2025-03-31 PROCEDURE — 2580000003 HC RX 258: Performed by: INTERNAL MEDICINE

## 2025-03-31 PROCEDURE — 2500000003 HC RX 250 WO HCPCS

## 2025-03-31 PROCEDURE — 2709999900 HC NON-CHARGEABLE SUPPLY: Performed by: INTERNAL MEDICINE

## 2025-03-31 PROCEDURE — C1874 STENT, COATED/COV W/DEL SYS: HCPCS | Performed by: INTERNAL MEDICINE

## 2025-03-31 PROCEDURE — C9600 PERC DRUG-EL COR STENT SING: HCPCS | Performed by: INTERNAL MEDICINE

## 2025-03-31 PROCEDURE — 99151 MOD SED SAME PHYS/QHP <5 YRS: CPT | Performed by: INTERNAL MEDICINE

## 2025-03-31 PROCEDURE — 6370000000 HC RX 637 (ALT 250 FOR IP): Performed by: INTERNAL MEDICINE

## 2025-03-31 PROCEDURE — 92920 PRQ TRLUML C ANGIOP 1ART&/BR: CPT | Performed by: INTERNAL MEDICINE

## 2025-03-31 PROCEDURE — 97110 THERAPEUTIC EXERCISES: CPT

## 2025-03-31 PROCEDURE — C1894 INTRO/SHEATH, NON-LASER: HCPCS | Performed by: INTERNAL MEDICINE

## 2025-03-31 PROCEDURE — 94640 AIRWAY INHALATION TREATMENT: CPT

## 2025-03-31 PROCEDURE — C1887 CATHETER, GUIDING: HCPCS | Performed by: INTERNAL MEDICINE

## 2025-03-31 PROCEDURE — 93458 L HRT ARTERY/VENTRICLE ANGIO: CPT | Performed by: INTERNAL MEDICINE

## 2025-03-31 PROCEDURE — 94660 CPAP INITIATION&MGMT: CPT

## 2025-03-31 PROCEDURE — 2500000003 HC RX 250 WO HCPCS: Performed by: INTERNAL MEDICINE

## 2025-03-31 PROCEDURE — 6360000002 HC RX W HCPCS: Performed by: INTERNAL MEDICINE

## 2025-03-31 PROCEDURE — 99233 SBSQ HOSP IP/OBS HIGH 50: CPT | Performed by: INTERNAL MEDICINE

## 2025-03-31 PROCEDURE — 99233 SBSQ HOSP IP/OBS HIGH 50: CPT | Performed by: NURSE PRACTITIONER

## 2025-03-31 PROCEDURE — 2700000000 HC OXYGEN THERAPY PER DAY

## 2025-03-31 PROCEDURE — 97530 THERAPEUTIC ACTIVITIES: CPT

## 2025-03-31 PROCEDURE — C1725 CATH, TRANSLUMIN NON-LASER: HCPCS | Performed by: INTERNAL MEDICINE

## 2025-03-31 PROCEDURE — 92929 PR PRQ TRLUML CORONARY STENT W/ANGIO ADDL ART/BRNCH: CPT | Performed by: INTERNAL MEDICINE

## 2025-03-31 PROCEDURE — 82947 ASSAY GLUCOSE BLOOD QUANT: CPT

## 2025-03-31 PROCEDURE — 7100000010 HC PHASE II RECOVERY - FIRST 15 MIN: Performed by: INTERNAL MEDICINE

## 2025-03-31 PROCEDURE — 2060000000 HC ICU INTERMEDIATE R&B

## 2025-03-31 PROCEDURE — 99153 MOD SED SAME PHYS/QHP EA: CPT | Performed by: INTERNAL MEDICINE

## 2025-03-31 PROCEDURE — 7100000011 HC PHASE II RECOVERY - ADDTL 15 MIN: Performed by: INTERNAL MEDICINE

## 2025-03-31 DEVICE — STENT ONYXNG27526UX ONYX 2.75X26RX
Type: IMPLANTABLE DEVICE | Status: FUNCTIONAL
Brand: ONYX FRONTIER™

## 2025-03-31 RX ORDER — ACETAMINOPHEN 650 MG/1
650 SUPPOSITORY RECTAL EVERY 6 HOURS PRN
Status: DISCONTINUED | OUTPATIENT
Start: 2025-03-31 | End: 2025-04-01 | Stop reason: HOSPADM

## 2025-03-31 RX ORDER — INSULIN GLARGINE 100 [IU]/ML
50 INJECTION, SOLUTION SUBCUTANEOUS EVERY MORNING
Status: DISCONTINUED | OUTPATIENT
Start: 2025-04-01 | End: 2025-04-01 | Stop reason: HOSPADM

## 2025-03-31 RX ORDER — IPRATROPIUM BROMIDE AND ALBUTEROL SULFATE 2.5; .5 MG/3ML; MG/3ML
1 SOLUTION RESPIRATORY (INHALATION) EVERY 4 HOURS PRN
Status: CANCELLED | OUTPATIENT
Start: 2025-03-31

## 2025-03-31 RX ORDER — MIDODRINE HYDROCHLORIDE 2.5 MG/1
2.5 TABLET ORAL 2 TIMES DAILY WITH MEALS
Status: DISCONTINUED | OUTPATIENT
Start: 2025-03-31 | End: 2025-04-01 | Stop reason: HOSPADM

## 2025-03-31 RX ORDER — ALBUTEROL SULFATE 0.83 MG/ML
2.5 SOLUTION RESPIRATORY (INHALATION) EVERY 6 HOURS PRN
Status: DISCONTINUED | OUTPATIENT
Start: 2025-03-31 | End: 2025-04-01 | Stop reason: HOSPADM

## 2025-03-31 RX ORDER — METOPROLOL TARTRATE 25 MG/1
12.5 TABLET, FILM COATED ORAL 2 TIMES DAILY
Status: DISCONTINUED | OUTPATIENT
Start: 2025-03-31 | End: 2025-04-01 | Stop reason: HOSPADM

## 2025-03-31 RX ORDER — BUDESONIDE AND FORMOTEROL FUMARATE DIHYDRATE 160; 4.5 UG/1; UG/1
2 AEROSOL RESPIRATORY (INHALATION)
Status: CANCELLED | OUTPATIENT
Start: 2025-03-31

## 2025-03-31 RX ORDER — ATORVASTATIN CALCIUM 80 MG/1
80 TABLET, FILM COATED ORAL DAILY
Qty: 100 TABLET | Refills: 5 | Status: SHIPPED | OUTPATIENT
Start: 2025-03-31

## 2025-03-31 RX ORDER — ACETAMINOPHEN 650 MG/1
650 SUPPOSITORY RECTAL EVERY 6 HOURS PRN
Status: CANCELLED | OUTPATIENT
Start: 2025-03-31

## 2025-03-31 RX ORDER — ATORVASTATIN CALCIUM 40 MG/1
40 TABLET, FILM COATED ORAL DAILY
Status: DISCONTINUED | OUTPATIENT
Start: 2025-04-01 | End: 2025-04-01 | Stop reason: HOSPADM

## 2025-03-31 RX ORDER — ESCITALOPRAM OXALATE 20 MG/1
20 TABLET ORAL NIGHTLY
Status: CANCELLED | OUTPATIENT
Start: 2025-03-31

## 2025-03-31 RX ORDER — DOCUSATE SODIUM 100 MG/1
100 CAPSULE, LIQUID FILLED ORAL 2 TIMES DAILY
Status: DISCONTINUED | OUTPATIENT
Start: 2025-03-31 | End: 2025-04-01 | Stop reason: HOSPADM

## 2025-03-31 RX ORDER — DEXTROSE MONOHYDRATE 100 MG/ML
INJECTION, SOLUTION INTRAVENOUS CONTINUOUS PRN
Status: DISCONTINUED | OUTPATIENT
Start: 2025-03-31 | End: 2025-04-01 | Stop reason: HOSPADM

## 2025-03-31 RX ORDER — ENOXAPARIN SODIUM 100 MG/ML
40 INJECTION SUBCUTANEOUS DAILY
Status: DISCONTINUED | OUTPATIENT
Start: 2025-04-01 | End: 2025-04-01 | Stop reason: HOSPADM

## 2025-03-31 RX ORDER — NITROGLYCERIN 20 MG/100ML
INJECTION INTRAVENOUS PRN
Status: DISCONTINUED | OUTPATIENT
Start: 2025-03-31 | End: 2025-03-31 | Stop reason: HOSPADM

## 2025-03-31 RX ORDER — ACETAMINOPHEN 325 MG/1
650 TABLET ORAL EVERY 6 HOURS PRN
Status: CANCELLED | OUTPATIENT
Start: 2025-03-31

## 2025-03-31 RX ORDER — OXYCODONE AND ACETAMINOPHEN 5; 325 MG/1; MG/1
1 TABLET ORAL EVERY 6 HOURS PRN
Refills: 0 | Status: CANCELLED | OUTPATIENT
Start: 2025-03-31

## 2025-03-31 RX ORDER — ALBUTEROL SULFATE 0.83 MG/ML
2.5 SOLUTION RESPIRATORY (INHALATION) EVERY 6 HOURS PRN
Status: CANCELLED | OUTPATIENT
Start: 2025-03-31

## 2025-03-31 RX ORDER — SODIUM CHLORIDE 0.9 % (FLUSH) 0.9 %
10 SYRINGE (ML) INJECTION PRN
Status: DISCONTINUED | OUTPATIENT
Start: 2025-03-31 | End: 2025-04-01 | Stop reason: HOSPADM

## 2025-03-31 RX ORDER — POTASSIUM CHLORIDE 7.45 MG/ML
10 INJECTION INTRAVENOUS PRN
Status: DISCONTINUED | OUTPATIENT
Start: 2025-03-31 | End: 2025-04-01 | Stop reason: HOSPADM

## 2025-03-31 RX ORDER — DEXTROSE MONOHYDRATE 100 MG/ML
INJECTION, SOLUTION INTRAVENOUS CONTINUOUS PRN
Status: CANCELLED | OUTPATIENT
Start: 2025-03-31

## 2025-03-31 RX ORDER — OXYBUTYNIN CHLORIDE 5 MG/1
5 TABLET, EXTENDED RELEASE ORAL NIGHTLY
Status: CANCELLED | OUTPATIENT
Start: 2025-03-31

## 2025-03-31 RX ORDER — MIDAZOLAM HYDROCHLORIDE 1 MG/ML
INJECTION, SOLUTION INTRAMUSCULAR; INTRAVENOUS PRN
Status: DISCONTINUED | OUTPATIENT
Start: 2025-03-31 | End: 2025-03-31 | Stop reason: HOSPADM

## 2025-03-31 RX ORDER — POTASSIUM CHLORIDE 7.45 MG/ML
10 INJECTION INTRAVENOUS PRN
Status: CANCELLED | OUTPATIENT
Start: 2025-03-31

## 2025-03-31 RX ORDER — INSULIN LISPRO 100 [IU]/ML
0-16 INJECTION, SOLUTION INTRAVENOUS; SUBCUTANEOUS
Status: DISCONTINUED | OUTPATIENT
Start: 2025-03-31 | End: 2025-04-01 | Stop reason: HOSPADM

## 2025-03-31 RX ORDER — PANTOPRAZOLE SODIUM 40 MG/1
40 TABLET, DELAYED RELEASE ORAL 2 TIMES DAILY
Status: DISCONTINUED | OUTPATIENT
Start: 2025-03-31 | End: 2025-04-01 | Stop reason: HOSPADM

## 2025-03-31 RX ORDER — ONDANSETRON 2 MG/ML
4 INJECTION INTRAMUSCULAR; INTRAVENOUS EVERY 6 HOURS PRN
Status: DISCONTINUED | OUTPATIENT
Start: 2025-03-31 | End: 2025-04-01 | Stop reason: HOSPADM

## 2025-03-31 RX ORDER — SODIUM CHLORIDE 0.9 % (FLUSH) 0.9 %
10 SYRINGE (ML) INJECTION PRN
Status: CANCELLED | OUTPATIENT
Start: 2025-03-31

## 2025-03-31 RX ORDER — SODIUM CHLORIDE 9 MG/ML
INJECTION, SOLUTION INTRAVENOUS CONTINUOUS
OUTPATIENT
Start: 2025-03-31

## 2025-03-31 RX ORDER — MIDODRINE HYDROCHLORIDE 2.5 MG/1
2.5 TABLET ORAL 2 TIMES DAILY WITH MEALS
Status: CANCELLED | OUTPATIENT
Start: 2025-03-31

## 2025-03-31 RX ORDER — METOPROLOL TARTRATE 25 MG/1
12.5 TABLET, FILM COATED ORAL 2 TIMES DAILY
Status: CANCELLED | OUTPATIENT
Start: 2025-03-31

## 2025-03-31 RX ORDER — POTASSIUM CHLORIDE 1500 MG/1
40 TABLET, EXTENDED RELEASE ORAL PRN
Status: CANCELLED | OUTPATIENT
Start: 2025-03-31

## 2025-03-31 RX ORDER — ALBUTEROL SULFATE 90 UG/1
2 INHALANT RESPIRATORY (INHALATION) 4 TIMES DAILY PRN
Status: CANCELLED | OUTPATIENT
Start: 2025-03-31

## 2025-03-31 RX ORDER — INSULIN GLARGINE 100 [IU]/ML
50 INJECTION, SOLUTION SUBCUTANEOUS EVERY MORNING
Status: CANCELLED | OUTPATIENT
Start: 2025-04-01

## 2025-03-31 RX ORDER — ACETAMINOPHEN 325 MG/1
650 TABLET ORAL EVERY 6 HOURS PRN
Status: DISCONTINUED | OUTPATIENT
Start: 2025-03-31 | End: 2025-04-01 | Stop reason: HOSPADM

## 2025-03-31 RX ORDER — ASPIRIN 81 MG/1
TABLET, CHEWABLE ORAL PRN
Status: DISCONTINUED | OUTPATIENT
Start: 2025-03-31 | End: 2025-03-31 | Stop reason: HOSPADM

## 2025-03-31 RX ORDER — BUDESONIDE AND FORMOTEROL FUMARATE DIHYDRATE 160; 4.5 UG/1; UG/1
2 AEROSOL RESPIRATORY (INHALATION)
Status: DISCONTINUED | OUTPATIENT
Start: 2025-03-31 | End: 2025-04-01 | Stop reason: HOSPADM

## 2025-03-31 RX ORDER — ISOSORBIDE MONONITRATE 60 MG/1
60 TABLET, EXTENDED RELEASE ORAL DAILY
Status: DISCONTINUED | OUTPATIENT
Start: 2025-04-01 | End: 2025-04-01 | Stop reason: HOSPADM

## 2025-03-31 RX ORDER — ASPIRIN 81 MG/1
81 TABLET ORAL DAILY
Status: CANCELLED | OUTPATIENT
Start: 2025-04-01

## 2025-03-31 RX ORDER — RANOLAZINE 500 MG/1
500 TABLET, EXTENDED RELEASE ORAL 2 TIMES DAILY
Status: CANCELLED | OUTPATIENT
Start: 2025-03-31

## 2025-03-31 RX ORDER — OXYCODONE AND ACETAMINOPHEN 5; 325 MG/1; MG/1
1 TABLET ORAL EVERY 6 HOURS PRN
Refills: 0 | Status: DISCONTINUED | OUTPATIENT
Start: 2025-03-31 | End: 2025-04-01 | Stop reason: HOSPADM

## 2025-03-31 RX ORDER — MAGNESIUM SULFATE 1 G/100ML
1000 INJECTION INTRAVENOUS PRN
Status: DISCONTINUED | OUTPATIENT
Start: 2025-03-31 | End: 2025-04-01 | Stop reason: HOSPADM

## 2025-03-31 RX ORDER — ENOXAPARIN SODIUM 100 MG/ML
40 INJECTION SUBCUTANEOUS DAILY
Status: CANCELLED | OUTPATIENT
Start: 2025-04-01

## 2025-03-31 RX ORDER — TOPIRAMATE 100 MG/1
100 TABLET, FILM COATED ORAL NIGHTLY
Status: CANCELLED | OUTPATIENT
Start: 2025-03-31

## 2025-03-31 RX ORDER — NITROGLYCERIN 0.4 MG/1
0.4 TABLET SUBLINGUAL EVERY 5 MIN PRN
Status: CANCELLED | OUTPATIENT
Start: 2025-03-31

## 2025-03-31 RX ORDER — ALOGLIPTIN 12.5 MG/1
12.5 TABLET, FILM COATED ORAL DAILY
Status: DISCONTINUED | OUTPATIENT
Start: 2025-04-01 | End: 2025-04-01 | Stop reason: HOSPADM

## 2025-03-31 RX ORDER — OXYBUTYNIN CHLORIDE 5 MG/1
5 TABLET, EXTENDED RELEASE ORAL NIGHTLY
Status: DISCONTINUED | OUTPATIENT
Start: 2025-03-31 | End: 2025-04-01 | Stop reason: HOSPADM

## 2025-03-31 RX ORDER — SODIUM CHLORIDE 9 MG/ML
INJECTION, SOLUTION INTRAVENOUS PRN
Status: CANCELLED | OUTPATIENT
Start: 2025-03-31

## 2025-03-31 RX ORDER — ALBUTEROL SULFATE 90 UG/1
2 INHALANT RESPIRATORY (INHALATION) 4 TIMES DAILY PRN
Status: DISCONTINUED | OUTPATIENT
Start: 2025-03-31 | End: 2025-04-01 | Stop reason: HOSPADM

## 2025-03-31 RX ORDER — RANOLAZINE 500 MG/1
500 TABLET, EXTENDED RELEASE ORAL 2 TIMES DAILY
Status: DISCONTINUED | OUTPATIENT
Start: 2025-03-31 | End: 2025-04-01 | Stop reason: HOSPADM

## 2025-03-31 RX ORDER — IPRATROPIUM BROMIDE AND ALBUTEROL SULFATE 2.5; .5 MG/3ML; MG/3ML
1 SOLUTION RESPIRATORY (INHALATION) EVERY 4 HOURS PRN
Status: DISCONTINUED | OUTPATIENT
Start: 2025-03-31 | End: 2025-04-01 | Stop reason: HOSPADM

## 2025-03-31 RX ORDER — NITROGLYCERIN 0.4 MG/1
0.4 TABLET SUBLINGUAL EVERY 5 MIN PRN
Status: DISCONTINUED | OUTPATIENT
Start: 2025-03-31 | End: 2025-04-01 | Stop reason: HOSPADM

## 2025-03-31 RX ORDER — SODIUM CHLORIDE 0.9 % (FLUSH) 0.9 %
5-40 SYRINGE (ML) INJECTION EVERY 12 HOURS SCHEDULED
Status: DISCONTINUED | OUTPATIENT
Start: 2025-03-31 | End: 2025-04-01 | Stop reason: HOSPADM

## 2025-03-31 RX ORDER — INSULIN GLARGINE 100 [IU]/ML
45 INJECTION, SOLUTION SUBCUTANEOUS NIGHTLY
Status: CANCELLED | OUTPATIENT
Start: 2025-03-31

## 2025-03-31 RX ORDER — INSULIN GLARGINE 100 [IU]/ML
45 INJECTION, SOLUTION SUBCUTANEOUS NIGHTLY
Status: DISCONTINUED | OUTPATIENT
Start: 2025-03-31 | End: 2025-04-01 | Stop reason: HOSPADM

## 2025-03-31 RX ORDER — ALOGLIPTIN 12.5 MG/1
12.5 TABLET, FILM COATED ORAL DAILY
Status: CANCELLED | OUTPATIENT
Start: 2025-04-01

## 2025-03-31 RX ORDER — POTASSIUM CHLORIDE 1500 MG/1
40 TABLET, EXTENDED RELEASE ORAL PRN
Status: DISCONTINUED | OUTPATIENT
Start: 2025-03-31 | End: 2025-04-01 | Stop reason: HOSPADM

## 2025-03-31 RX ORDER — ESCITALOPRAM OXALATE 20 MG/1
20 TABLET ORAL NIGHTLY
Status: DISCONTINUED | OUTPATIENT
Start: 2025-03-31 | End: 2025-04-01 | Stop reason: HOSPADM

## 2025-03-31 RX ORDER — PANTOPRAZOLE SODIUM 40 MG/1
40 TABLET, DELAYED RELEASE ORAL 2 TIMES DAILY
Status: CANCELLED | OUTPATIENT
Start: 2025-03-31

## 2025-03-31 RX ORDER — ATORVASTATIN CALCIUM 40 MG/1
40 TABLET, FILM COATED ORAL DAILY
Status: CANCELLED | OUTPATIENT
Start: 2025-04-01

## 2025-03-31 RX ORDER — INSULIN LISPRO 100 [IU]/ML
0-16 INJECTION, SOLUTION INTRAVENOUS; SUBCUTANEOUS
Status: CANCELLED | OUTPATIENT
Start: 2025-03-31

## 2025-03-31 RX ORDER — DOCUSATE SODIUM 100 MG/1
100 CAPSULE, LIQUID FILLED ORAL 2 TIMES DAILY
Status: CANCELLED | OUTPATIENT
Start: 2025-03-31

## 2025-03-31 RX ORDER — BIVALIRUDIN 250 MG/5ML
INJECTION, POWDER, LYOPHILIZED, FOR SOLUTION INTRAVENOUS PRN
Status: DISCONTINUED | OUTPATIENT
Start: 2025-03-31 | End: 2025-03-31 | Stop reason: HOSPADM

## 2025-03-31 RX ORDER — ISOSORBIDE MONONITRATE 60 MG/1
60 TABLET, EXTENDED RELEASE ORAL DAILY
Status: CANCELLED | OUTPATIENT
Start: 2025-04-01

## 2025-03-31 RX ORDER — ONDANSETRON 4 MG/1
4 TABLET, ORALLY DISINTEGRATING ORAL EVERY 8 HOURS PRN
Status: DISCONTINUED | OUTPATIENT
Start: 2025-03-31 | End: 2025-04-01 | Stop reason: HOSPADM

## 2025-03-31 RX ORDER — SODIUM CHLORIDE 0.9 % (FLUSH) 0.9 %
5-40 SYRINGE (ML) INJECTION EVERY 12 HOURS SCHEDULED
Status: CANCELLED | OUTPATIENT
Start: 2025-03-31

## 2025-03-31 RX ORDER — ONDANSETRON 2 MG/ML
4 INJECTION INTRAMUSCULAR; INTRAVENOUS EVERY 6 HOURS PRN
Status: CANCELLED | OUTPATIENT
Start: 2025-03-31

## 2025-03-31 RX ORDER — TOPIRAMATE 100 MG/1
100 TABLET, FILM COATED ORAL NIGHTLY
Status: DISCONTINUED | OUTPATIENT
Start: 2025-03-31 | End: 2025-04-01 | Stop reason: HOSPADM

## 2025-03-31 RX ORDER — MAGNESIUM SULFATE 1 G/100ML
1000 INJECTION INTRAVENOUS PRN
Status: CANCELLED | OUTPATIENT
Start: 2025-03-31

## 2025-03-31 RX ORDER — SODIUM CHLORIDE 9 MG/ML
INJECTION, SOLUTION INTRAVENOUS PRN
Status: DISCONTINUED | OUTPATIENT
Start: 2025-03-31 | End: 2025-04-01 | Stop reason: HOSPADM

## 2025-03-31 RX ORDER — IOPAMIDOL 755 MG/ML
INJECTION, SOLUTION INTRAVASCULAR PRN
Status: DISCONTINUED | OUTPATIENT
Start: 2025-03-31 | End: 2025-03-31 | Stop reason: HOSPADM

## 2025-03-31 RX ORDER — ASPIRIN 81 MG/1
81 TABLET ORAL DAILY
Status: DISCONTINUED | OUTPATIENT
Start: 2025-04-01 | End: 2025-04-01 | Stop reason: HOSPADM

## 2025-03-31 RX ORDER — ONDANSETRON 4 MG/1
4 TABLET, ORALLY DISINTEGRATING ORAL EVERY 8 HOURS PRN
Status: CANCELLED | OUTPATIENT
Start: 2025-03-31

## 2025-03-31 RX ORDER — SODIUM CHLORIDE 9 MG/ML
INJECTION, SOLUTION INTRAVENOUS CONTINUOUS
Status: DISCONTINUED | OUTPATIENT
Start: 2025-03-31 | End: 2025-03-31 | Stop reason: HOSPADM

## 2025-03-31 RX ORDER — IPRATROPIUM BROMIDE AND ALBUTEROL SULFATE 2.5; .5 MG/3ML; MG/3ML
1 SOLUTION RESPIRATORY (INHALATION)
Status: CANCELLED | OUTPATIENT
Start: 2025-03-31

## 2025-03-31 RX ORDER — IPRATROPIUM BROMIDE AND ALBUTEROL SULFATE 2.5; .5 MG/3ML; MG/3ML
1 SOLUTION RESPIRATORY (INHALATION)
Status: DISCONTINUED | OUTPATIENT
Start: 2025-03-31 | End: 2025-04-01

## 2025-03-31 RX ADMIN — INSULIN LISPRO 4 UNITS: 100 INJECTION, SOLUTION INTRAVENOUS; SUBCUTANEOUS at 21:25

## 2025-03-31 RX ADMIN — ESCITALOPRAM OXALATE 20 MG: 20 TABLET, FILM COATED ORAL at 21:24

## 2025-03-31 RX ADMIN — WATER 125 MG: 1 INJECTION INTRAMUSCULAR; INTRAVENOUS; SUBCUTANEOUS at 14:06

## 2025-03-31 RX ADMIN — MICONAZOLE NITRATE: 20 POWDER TOPICAL at 21:30

## 2025-03-31 RX ADMIN — DOCUSATE SODIUM 100 MG: 100 CAPSULE, LIQUID FILLED ORAL at 21:23

## 2025-03-31 RX ADMIN — SODIUM CHLORIDE, PRESERVATIVE FREE 10 ML: 5 INJECTION INTRAVENOUS at 21:25

## 2025-03-31 RX ADMIN — PANTOPRAZOLE SODIUM 40 MG: 40 TABLET, DELAYED RELEASE ORAL at 21:23

## 2025-03-31 RX ADMIN — Medication 2 PUFF: at 07:36

## 2025-03-31 RX ADMIN — ASPIRIN 81 MG: 81 TABLET, COATED ORAL at 10:13

## 2025-03-31 RX ADMIN — OXYCODONE HYDROCHLORIDE AND ACETAMINOPHEN 1 TABLET: 5; 325 TABLET ORAL at 21:24

## 2025-03-31 RX ADMIN — IPRATROPIUM BROMIDE AND ALBUTEROL SULFATE 1 DOSE: .5; 2.5 SOLUTION RESPIRATORY (INHALATION) at 11:11

## 2025-03-31 RX ADMIN — SODIUM CHLORIDE, PRESERVATIVE FREE 10 ML: 5 INJECTION INTRAVENOUS at 10:16

## 2025-03-31 RX ADMIN — IPRATROPIUM BROMIDE AND ALBUTEROL SULFATE 1 DOSE: .5; 2.5 SOLUTION RESPIRATORY (INHALATION) at 20:17

## 2025-03-31 RX ADMIN — OXYBUTYNIN CHLORIDE 5 MG: 5 TABLET, EXTENDED RELEASE ORAL at 21:23

## 2025-03-31 RX ADMIN — METOPROLOL TARTRATE 12.5 MG: 25 TABLET, FILM COATED ORAL at 10:13

## 2025-03-31 RX ADMIN — RANOLAZINE 500 MG: 500 TABLET, EXTENDED RELEASE ORAL at 10:12

## 2025-03-31 RX ADMIN — BUDESONIDE AND FORMOTEROL FUMARATE DIHYDRATE 2 PUFF: 160; 4.5 AEROSOL RESPIRATORY (INHALATION) at 20:22

## 2025-03-31 RX ADMIN — TOPIRAMATE 100 MG: 100 TABLET, FILM COATED ORAL at 21:23

## 2025-03-31 RX ADMIN — ISOSORBIDE MONONITRATE 60 MG: 60 TABLET, EXTENDED RELEASE ORAL at 10:13

## 2025-03-31 RX ADMIN — Medication 3 MG: at 22:28

## 2025-03-31 RX ADMIN — RANOLAZINE 500 MG: 500 TABLET, EXTENDED RELEASE ORAL at 21:23

## 2025-03-31 RX ADMIN — ONDANSETRON 4 MG: 2 INJECTION, SOLUTION INTRAMUSCULAR; INTRAVENOUS at 21:27

## 2025-03-31 RX ADMIN — IPRATROPIUM BROMIDE AND ALBUTEROL SULFATE 1 DOSE: .5; 2.5 SOLUTION RESPIRATORY (INHALATION) at 07:36

## 2025-03-31 RX ADMIN — INSULIN GLARGINE 45 UNITS: 100 INJECTION, SOLUTION SUBCUTANEOUS at 21:24

## 2025-03-31 RX ADMIN — TICAGRELOR 90 MG: 90 TABLET ORAL at 10:13

## 2025-03-31 RX ADMIN — METOPROLOL TARTRATE 12.5 MG: 25 TABLET, FILM COATED ORAL at 22:28

## 2025-03-31 ASSESSMENT — PAIN SCALES - GENERAL
PAINLEVEL_OUTOF10: 9
PAINLEVEL_OUTOF10: 8
PAINLEVEL_OUTOF10: 5

## 2025-03-31 ASSESSMENT — PAIN DESCRIPTION - DESCRIPTORS: DESCRIPTORS: DISCOMFORT

## 2025-03-31 ASSESSMENT — PAIN DESCRIPTION - LOCATION: LOCATION: HEAD

## 2025-03-31 NOTE — PROGRESS NOTES
Patient back from Heart Cath at [facility]. Right Radial site with 2 vascular bands intact. No bleeding or hematoma noted. Education on restrictions reviewed with patient. Vitals as charted. See post cath assessment charting in flow sheet.

## 2025-03-31 NOTE — PROGRESS NOTES
Patient received post cath to [facility] 7. Assessment obtained.  Post cath pathway initiated. right radial site with Vasc band x 2 intact. No hematoma noted. Restrictions reviewed with patient.  Patient without complaints.

## 2025-03-31 NOTE — PROGRESS NOTES
Patient admitted, consent signed and questions answered. Patient ready for procedure. Call light to reach with side rails up 2 of 2. Groin hair clipped with writer and Sulma present.  No one at bedside with patient.  History and physical complete.

## 2025-03-31 NOTE — PROGRESS NOTES
Physical Therapy    [facility]   Physical Therapy Treatment  Date: 3/31/25  Patient Name: Mariangel Abreu       Room:   MRN: [id number]  Account: [id number]   : 1950  (74 y.o.) Gender: female     Discharge Recommendations:  Discharge Recommendations: Therapy recommended at discharge, Patient would benefit from continued therapy after discharge          General  Patient assessed for rehabilitation services?: Yes  Referral Date : 25  Diagnosis: Chest pain , falls  Follows Commands: Within Functional Limits    Past Medical History:  has a past medical history of Abdominal bloating, Adenomatous polyp of ascending colon, Allergic rhinitis, Anxiety, Arthritis, Asthma, Atrial fibrillation ([facility]), Back pain, Benign hypertension with CKD (chronic kidney disease) stage III ([facility]), CAD (coronary artery disease), Caffeine use, CHF (congestive heart failure) ([facility]), Chronic kidney disease, CKD (chronic kidney disease) stage 3, GFR 30-59 ml/min ([facility]), Claudication, COPD (chronic obstructive pulmonary disease) ([facility]), Degeneration of lumbar or lumbosacral intervertebral disc, Depression, DM (diabetes mellitus) ([facility]), Emphysema of lung ([facility]), Gastritis, GERD (gastroesophageal reflux disease), Hearing loss, Hematuria, Hiatal hernia, History of loop recorder, HTN (hypertension), benign, Hypertriglyceridemia, Incontinence of urine, Irritable bowel syndrome with both constipation and diarrhea, Kidney stone, Knee arthropathy, Long term (current) use of anticoagulants, Lumbosacral spondylosis without myelopathy, Migraine headache, Mumps, Neuropathy, Obesity, On home oxygen therapy, HIGINIO on CPAP, Otitis media, Secondary diabetes mellitus with stage 3 chronic kidney disease (GFR 30-59) ([facility]), STEMI (ST elevation myocardial infarction) ([facility]), Stroke ([facility]), TIA (transient ischemic attack), Tinnitus, Type 2 diabetes mellitus without complication ([facility]), Type II or unspecified type diabetes

## 2025-03-31 NOTE — INTERVAL H&P NOTE
Update History & Physical    The patient's History and Physical of March 29, 2025 was reviewed with the patient and I examined the patient. There was no change. The surgical site was confirmed by the patient and me.     Plan: The risks, benefits, expected outcome, and alternative to the recommended procedure have been discussed with the patient. Patient understands and wants to proceed with the procedure.     Electronically signed by Sherice Gabriel MD on 3/31/2025 at 2:42 PM

## 2025-03-31 NOTE — PROGRESS NOTES
Patient transferred to [facility] for heart cath. Report given to [facility]. All questions answered.

## 2025-03-31 NOTE — DISCHARGE INSTR - COC
disc disease), cervical M50.30    GERD (gastroesophageal reflux disease) K21.9    Essential hypertension I10    Mixed hyperlipidemia E78.2    Insomnia G47.00    Lumbosacral spondylosis without myelopathy M47.817    Sacroiliitis, not elsewhere classified M46.1    Carpal tunnel syndrome G56.00    Mixed stress and urge urinary incontinence N39.46    Intractable migraine with aura without status migrainosus G43.119    Anxiety and depression F41.9, F32.A    Chronic migraine without aura without status migrainosus, not intractable G43.709    Morbid (severe) obesity with alveolar hypoventilation E66.2    Chronic nausea R11.0    Lung nodule R91.1    Neuropathy G62.9    YAJAIRA (acute kidney injury) N17.9    Obstructive sleep apnea syndrome G47.33    Asthma with COPD ([facility]) J44.89    Obesity, Class III, BMI 40-49.9 (morbid obesity) E66.01    Stage 3 chronic kidney disease ([facility]) N18.30    Benign hypertension with CKD (chronic kidney disease) stage III ([facility]) I12.9, N18.30    Secondary diabetes mellitus with stage 3 chronic kidney disease (GFR 30-59) ([facility]) E13.22, N18.30    CKD (chronic kidney disease) stage 3, GFR 30-59 ml/min N18.30    Lumbar radiculopathy, chronic M54.16    Sessile colonic polyp K63.5    Morbid obesity E66.01    Adenomatous polyp of ascending colon D12.2    Irritable bowel syndrome with both constipation and diarrhea K58.2    Abdominal bloating R14.0    Major depressive disorder, single episode, unspecified F32.9    Permanent atrial fibrillation ([facility]) I48.21    Atypical chest pain R07.89    Left ventricular diastolic dysfunction I51.9    Transaminasemia R74.01    Acute respiratory failure with hypoxia and hypercarbia ([facility]) J96.01, J96.02    Macrocytosis D75.89    Morbid obesity with BMI of 40.0-44.9, adult E66.01, Z68.41    Chronic diastolic congestive heart failure ([facility]) I50.32    Oxygen dependent Z99.81    Chronic bilateral low back pain with bilateral sciatica M54.42, M54.41, G89.29    Left lower lobe

## 2025-03-31 NOTE — PROGRESS NOTES
Writer spoke with Corina at [facility]. [facility] is arranged to transport patient to [facility] cath lab at 12pm. RN notified.

## 2025-03-31 NOTE — CARE COORDINATION
Haleigh with [facility] called and they can accept. She stated to call back and let her know when to start auth.     Electronically signed by Jessica Julio RN on 3/31/2025 at 8:08 AM

## 2025-03-31 NOTE — PROGRESS NOTES
[facility] PULMONARY,CRITICAL CARE & SLEEP   Alfredocaleb Kim MD/Eddie BANDA AGAANGELIQUEP-BC, NP-C      Marianna BANDA NP-C     Meliton BANDA NP-C                                          Pulmonary Progress Note    Patient - Mariangel Abreu   Age - 74 y.o.   - 1950  MRN - [id number]  Acct # - [id number]  Date of Admission - 3/28/2025  1:07 PM    Consulting Service/Physician:       Primary Care Physician: Nuzhat Ramos DTR    SUBJECTIVE:     Chief Complaint:   Chief Complaint   Patient presents with    Dizziness    Shortness of Breath     Subjective:    Mariangel is seen lying in bed.  She is going to have a left heart cath done today.  EMS is here to pick her up to trance for her over to [facility].  She is currently on 3 L.  Her home baseline oxygen is 2 L.  She is tolerating CPAP at night.  She denies any pulmonary complaints.    VITALS  /68   Pulse 62   Temp 97.5 °F (36.4 °C) (Oral)   Resp 18   Ht 1.61 m (5' 3.39\")   Wt 93.6 kg (206 lb 5.6 oz)   LMP  (LMP Unknown)   SpO2 96%   BMI 36.11 kg/m²   Wt Readings from Last 3 Encounters:   25 93.6 kg (206 lb 5.6 oz)   25 100.2 kg (221 lb)   25 99.8 kg (220 lb)     I/O (24 Hours)    Intake/Output Summary (Last 24 hours) at 3/31/2025 1158  Last data filed at 3/31/2025 0524  Gross per 24 hour   Intake 480 ml   Output 1250 ml   Net -770 ml     Ventilator:   Settings  FiO2 : 30 %  Exam:   Physical Exam   Constitutional: Obese female lying in bed on 3 L in no distress  HENT: Unremarkable  Head: Normocephalic and atraumatic.   Eyes: EOM are normal. Pupils are equal, round, and reactive to light.   Neck: Neck supple.   Cardiovascular:  Regular rate and rhythm.  Normal heart tones.  No JVD.    Pulmonary/Chest: Respirations even and unlabored, lungs slightly diminished, on 3 L with pulse ox 96%  Abdominal: Soft. Bowel sounds are normal.   Musculoskeletal: Normal range of motion.

## 2025-03-31 NOTE — PROGRESS NOTES
and active bowel sounds.      In the ED Na 135, Cr 1.3 which appears to be baseline, glucose 387, Troponin 13.   CXR showed Nodular opacities right lung base, consider chest CT to assess for a  neoplastic process. Patient given Benadryl 25 mg IV, Regular insulin 7 units subcutaneous, Solu-Medrol 60 mg IV, and 2 L of 0.9% NS.     Plan to admit inpatient to Progressive care for chest pain and cardiology consult.  PT.  OT.      Past Medical History:     Past Medical History:   Diagnosis Date    Abdominal bloating 01/26/2021    Adenomatous polyp of ascending colon 01/26/2021    Allergic rhinitis     Anxiety 07/17/2013    Arthritis     Asthma     Atrial fibrillation ([facility])     Back pain     NERVE/DR. GRACIA    Benign hypertension with CKD (chronic kidney disease) stage III ([facility])     CAD (coronary artery disease) 03/21/2013    Caffeine use     2 coffee/day    CHF (congestive heart failure) ([facility])     Chronic kidney disease     CKD (chronic kidney disease) stage 3, GFR 30-59 ml/min ([facility])     Claudication 6/2/2024    COPD (chronic obstructive pulmonary disease) ([facility])     emphysema    Degeneration of lumbar or lumbosacral intervertebral disc     Depression     DM (diabetes mellitus) ([facility])     Emphysema of lung ([facility])     Gastritis     GERD (gastroesophageal reflux disease)     Hearing loss     Hematuria     Hiatal hernia     History of loop recorder     HTN (hypertension), benign 06/28/2014    Hypertriglyceridemia     Incontinence of urine 09/25/2013    Irritable bowel syndrome with both constipation and diarrhea 01/26/2021    Kidney stone     Knee arthropathy     Long term (current) use of anticoagulants 02/08/2024    Lumbosacral spondylosis without myelopathy 09/29/2014    Migraine headache     DR. GRACIA    Mumps     Neuropathy     Obesity     On home oxygen therapy     2 L PER NC  HS AND PRN    HIGINIO on CPAP     Otitis media 01/26/2015    Secondary diabetes mellitus with stage 3 chronic kidney disease (GFR 30-59) ([facility])            Imaging/Diagnostics:  CT CHEST W CONTRAST  Result Date: 3/28/2025  1. No acute pulmonary abnormality is identified. 2. Stable 7-8 mm right middle lobe pulmonary nodule, unchanged dating back to 04/22/2020. Otherwise no suspicious pulmonary nodule is identified. 3. Additional chronic findings, as above.     XR CHEST PORTABLE  Result Date: 3/28/2025  No acute cardiopulmonary process Nodular opacities right lung base, consider chest CT to assess for a neoplastic process       Assessment :      Hospital Problems           Last Modified POA    * (Principal) Chest pain 3/28/2025 Yes    Type 2 diabetes mellitus with hyperosmolar hyperglycemic state (HHS) ([facility]) 3/29/2025 Yes    COPD (chronic obstructive pulmonary disease) 3/29/2025 Yes    CAD S/P percutaneous coronary angioplasty 3/29/2025 Yes    Chronic diastolic congestive heart failure ([facility]) 3/29/2025 Yes    Chronic bilateral low back pain with bilateral sciatica (Chronic) 3/29/2025 Yes    Chronic use of opiate for therapeutic purpose 3/29/2025 Yes    Mixed hyperlipidemia (Chronic) 3/29/2025 Yes    Overview Signed 9/7/2015  4:50 AM by Ambulatory, Admin   replace inactive diagnosis         Morbid (severe) obesity with alveolar hypoventilation 3/29/2025 Yes    Stage 3 chronic kidney disease ([facility]) 3/29/2025 Yes    Benign hypertension with CKD (chronic kidney disease) stage III ([facility]) 3/29/2025 Yes    Secondary diabetes mellitus with stage 3 chronic kidney disease (GFR 30-59) ([facility]) 3/29/2025 Yes    Uncontrolled type 2 diabetes mellitus with hyperglycemia ([facility]) 3/29/2025 Yes    Moderate malnutrition 3/29/2025 Yes       Plan:     Patient status inpatient in the Progressive Unit/Step down    1.  74-year-old female with underlying history of coronary disease with 4 stents admitted with unstable angina, EKG and troponin reviewed, cardiology on board, continue aspirin, Brilinta statins beta-blockers, patient cath in March 2024 with stent placement, echo showed preserved left

## 2025-03-31 NOTE — CARE COORDINATION
DISCHARGE PLANNING NOTE:    Writer attempted to meet with pt to discuss acceptance to [facility], pt has been transferred for heart cath. Writer will attempt to meet with pt upon return.    Electronically signed by ADOLFO Piña on 3/31/2025 at 12:51 PM

## 2025-03-31 NOTE — PROGRESS NOTES
[facility]  Progress Note                   Date:   3/31/2025  Patient name: Mariangel Abreu  Date of admission:  3/28/2025  1:07 PM  MRN:   [id number]  YOB: 1950  PCP: Nuzhat Ramos DTR    Reason for Admission: Chest pain [R07.9]    Subjective:       Clinical Changes /Abnormalities: pt. Seen and examined alone in room lying quilety in bed. Denies any CP at present but states overnight she did have several episodes of left side chest pain, worse when she gets up. Denies any SOB/VENTURA. NPO for [facility] today    Review of Systems    Medications:   Scheduled Meds:   ipratropium 0.5 mg-albuterol 2.5 mg  1 Dose Inhalation Q4H WA RT    alogliptin  12.5 mg Oral Daily    aspirin  81 mg Oral Daily    atorvastatin  40 mg Oral Daily    budesonide-formoterol  2 puff Inhalation BID RT    empagliflozin  10 mg Oral Daily    docusate sodium  100 mg Oral BID    escitalopram  20 mg Oral Nightly    insulin glargine  50 Units SubCUTAneous QAM    And    insulin glargine  45 Units SubCUTAneous Nightly    isosorbide mononitrate  60 mg Oral Daily    metoprolol tartrate  12.5 mg Oral BID    midodrine  2.5 mg Oral BID WC    miconazole   Topical BID    oxyBUTYnin  5 mg Oral Nightly    pantoprazole  40 mg Oral BID    ranolazine  500 mg Oral BID    ticagrelor  90 mg Oral BID    topiramate  100 mg Oral Nightly    sodium chloride flush  5-40 mL IntraVENous 2 times per day    enoxaparin  40 mg SubCUTAneous Daily    insulin lispro  0-16 Units SubCUTAneous 4x Daily AC & HS     Continuous Infusions:   sodium chloride      dextrose       CBC:   Recent Labs     03/28/25  1319 03/29/25  0526   WBC 8.1 10.8   HGB 14.6 13.6    202     BMP:    Recent Labs     03/28/25  1319 03/28/25  1611 03/29/25  0526   *  --  137   K 4.1  --  4.5   CL 98  --  101   CO2 23  --  25   BUN 19  --  22   CREATININE 1.3*  --  1.2   GLUCOSE 387* 143 209*     Hepatic:  Recent Labs     03/28/25  1319   AST 16   ALT 9*   BILITOT 0.3  diabetes mellitus with hyperglycemia (HCC)     Chest pressure     Microhematuria     Right kidney stone     Renal cyst, left     Nocturia     Dental infection     Acute kidney failure, unspecified     Allergic rhinitis, unspecified     Asymptomatic varicose veins of unspecified lower extremity     Dependence on other enabling machines and devices     Diaphragmatic hernia without obstruction or gangrene     Difficulty in walking, not elsewhere classified     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, or end stage renal disease (HCC)     Muscle weakness (generalized)     Other specified diseases of blood and blood-forming organs     Presence of intraocular lens     Tinnitus, unspecified ear     Unspecified hearing loss, bilateral     STEMI (ST elevation myocardial infarction) (HCC)     Acquired absence of other specified parts of digestive tract     Long term (current) use of anticoagulants     Nonrheumatic mitral (valve) insufficiency     Partially vaccinated for covid-19     History of colonic polyps     Personal history of pneumonia (recurrent)     Presence of coronary angioplasty implant and graft     Personal history of urinary calculi     Weakness     History of ST elevation myocardial infarction (STEMI)     Coronary artery disease involving native coronary artery of native heart without angina pectoris     Secondary hypercoagulable state     Familial hypercholesterolemia     Claudication     Type 2 diabetes mellitus with hyperglycemia (HCC)     Hyperglycemia     CAD S/P percutaneous coronary angioplasty     TIA (transient ischemic attack)     Facial pain, acute     Type 2 diabetes mellitus with hyperglycemia, with long-term current use of insulin (HCC)     Uncontrolled blood glucose     Elevated erythrocyte sedimentation rate     Elevated C-reactive protein (CRP)     Chest pain     Sprain of temporomandibular joint or ligament     Neck pain

## 2025-03-31 NOTE — PROGRESS NOTES
Writer messaged Karen WALKER NP with cardiology and updated pt being picked up for heart cath at 12:30. Morning meds reviewed. Give all cardiac meds, ASA and Brilinta. Hold lovenox.

## 2025-03-31 NOTE — PROGRESS NOTES
Patient is scheduled to have a heart cath today. Patient has been NPO since midnight and had a CHG bath. Patient has linen change , gown and patches changed as well./ resting in bed at this time with no complain of pain nor distress noted

## 2025-03-31 NOTE — PLAN OF CARE
Problem: Chronic Conditions and Co-morbidities  Goal: Patient's chronic conditions and co-morbidity symptoms are monitored and maintained or improved  3/31/2025 0255 by Vika Garcia RN  Outcome: Progressing  Flowsheets (Taken 3/29/2025 0856 by Daisha Garcia, RN)  Care Plan - Patient's Chronic Conditions and Co-Morbidity Symptoms are Monitored and Maintained or Improved: Monitor and assess patient's chronic conditions and comorbid symptoms for stability, deterioration, or improvement     Problem: Discharge Planning  Goal: Discharge to home or other facility with appropriate resources  3/31/2025 0255 by Vika Garcia, RN  Outcome: Progressing  Flowsheets (Taken 3/31/2025 0255)  Discharge to home or other facility with appropriate resources:   Identify discharge learning needs (meds, wound care, etc)   Identify barriers to discharge with patient and caregiver   Refer to discharge planning if patient needs post-hospital services based on physician order or complex needs related to functional status, cognitive ability or social support system     Problem: Skin/Tissue Integrity  Goal: Skin integrity remains intact  Description: 1.  Monitor for areas of redness and/or skin breakdown  2.  Assess vascular access sites hourly  3.  Every 4-6 hours minimum:  Change oxygen saturation probe site  4.  Every 4-6 hours:  If on nasal continuous positive airway pressure, respiratory therapy assess nares and determine need for appliance change or resting period  3/31/2025 0255 by Vika Garcia, RN  Outcome: Progressing  Note: Skin assessment complete. Waffle mattress in place. Coccyx reddened. [employer] applied PRN. Turned and repositioned every two hours.  Area kept free from moisture. Proper nourishment and fluids encouraged, as appropriate. Will continue to monitor for additional needs and changes in skin breakdown.       Problem: Safety - Adult  Goal: Free from fall injury  3/31/2025 0255 by Jose  Vika Rodas, RN  Outcome: Progressing  Note: Pt assessed as a fall risk this shift. Remains free from falls and accidental injury at this time. Fall precautions in place, including falling star sign and fall risk band on pt. Floor free from obstacles, and bed is locked and in lowest position. Adequate lighting provided.  Pt encouraged to call for any need.  Bed alarm activated. Will continue to monitor needs during hourly rounding, and reinforce education on use of call light.       Problem: Pain  Goal: Verbalizes/displays adequate comfort level or baseline comfort level  3/31/2025 0255 by Vika Garcia, RN  Outcome: Progressing  Note: Pt medicated with pain medication prn.  Assessed all pain characteristics including level, type, location, frequency, and onset.  Non-pharmacologic interventions offered to pt as well.  Pt states pain is tolerable at this time.

## 2025-04-01 VITALS
WEIGHT: 202.82 LBS | BODY MASS INDEX: 35.49 KG/M2 | OXYGEN SATURATION: 94 % | HEART RATE: 74 BPM | DIASTOLIC BLOOD PRESSURE: 46 MMHG | RESPIRATION RATE: 16 BRPM | TEMPERATURE: 97.8 F | SYSTOLIC BLOOD PRESSURE: 110 MMHG

## 2025-04-01 LAB
ALBUMIN SERPL-MCNC: 3.8 G/DL (ref 3.5–5.2)
ALP SERPL-CCNC: 60 U/L (ref 35–104)
ALT SERPL-CCNC: 9 U/L (ref 10–35)
ANION GAP SERPL CALCULATED.3IONS-SCNC: 12 MMOL/L (ref 9–16)
AST SERPL-CCNC: 15 U/L (ref 10–35)
BASOPHILS # BLD: 0 K/UL (ref 0–0.2)
BASOPHILS NFR BLD: 0 % (ref 0–2)
BILIRUB SERPL-MCNC: <0.2 MG/DL (ref 0–1.2)
BUN SERPL-MCNC: 38 MG/DL (ref 8–23)
CALCIUM SERPL-MCNC: 9.2 MG/DL (ref 8.6–10.4)
CHLORIDE SERPL-SCNC: 98 MMOL/L (ref 98–107)
CO2 SERPL-SCNC: 24 MMOL/L (ref 20–31)
CREAT SERPL-MCNC: 1.4 MG/DL (ref 0.7–1.2)
EOSINOPHIL # BLD: 0 K/UL (ref 0–0.4)
EOSINOPHILS RELATIVE PERCENT: 0 % (ref 0–4)
ERYTHROCYTE [DISTWIDTH] IN BLOOD BY AUTOMATED COUNT: 15.7 % (ref 11.5–14.9)
GFR, ESTIMATED: 39 ML/MIN/1.73M2
GLUCOSE BLD-MCNC: 182 MG/DL (ref 65–105)
GLUCOSE BLD-MCNC: 252 MG/DL (ref 65–105)
GLUCOSE BLD-MCNC: 274 MG/DL (ref 65–105)
GLUCOSE SERPL-MCNC: 208 MG/DL (ref 74–99)
HCT VFR BLD AUTO: 40.9 % (ref 36–46)
HGB BLD-MCNC: 13 G/DL (ref 12–16)
LYMPHOCYTES NFR BLD: 1.1 K/UL (ref 1–4.8)
LYMPHOCYTES RELATIVE PERCENT: 10 % (ref 24–44)
MCH RBC QN AUTO: 29.1 PG (ref 26–34)
MCHC RBC AUTO-ENTMCNC: 31.8 G/DL (ref 31–37)
MCV RBC AUTO: 91.5 FL (ref 80–100)
MONOCYTES NFR BLD: 0.9 K/UL (ref 0.1–1.3)
MONOCYTES NFR BLD: 8 % (ref 1–7)
NEUTROPHILS NFR BLD: 82 % (ref 36–66)
NEUTS SEG NFR BLD: 8.7 K/UL (ref 1.3–9.1)
PLATELET # BLD AUTO: 191 K/UL (ref 150–450)
PMV BLD AUTO: 9.6 FL (ref 6–12)
POTASSIUM SERPL-SCNC: 4.3 MMOL/L (ref 3.7–5.3)
PROT SERPL-MCNC: 7.1 G/DL (ref 6.6–8.7)
RBC # BLD AUTO: 4.47 M/UL (ref 4–5.2)
SODIUM SERPL-SCNC: 134 MMOL/L (ref 136–145)
WBC OTHER # BLD: 10.7 K/UL (ref 3.5–11)

## 2025-04-01 PROCEDURE — 94660 CPAP INITIATION&MGMT: CPT

## 2025-04-01 PROCEDURE — 6370000000 HC RX 637 (ALT 250 FOR IP): Performed by: NURSE PRACTITIONER

## 2025-04-01 PROCEDURE — 2700000000 HC OXYGEN THERAPY PER DAY

## 2025-04-01 PROCEDURE — 94761 N-INVAS EAR/PLS OXIMETRY MLT: CPT

## 2025-04-01 PROCEDURE — 6360000002 HC RX W HCPCS: Performed by: INTERNAL MEDICINE

## 2025-04-01 PROCEDURE — 99233 SBSQ HOSP IP/OBS HIGH 50: CPT | Performed by: NURSE PRACTITIONER

## 2025-04-01 PROCEDURE — 6370000000 HC RX 637 (ALT 250 FOR IP): Performed by: INTERNAL MEDICINE

## 2025-04-01 PROCEDURE — 80053 COMPREHEN METABOLIC PANEL: CPT

## 2025-04-01 PROCEDURE — 2500000003 HC RX 250 WO HCPCS: Performed by: INTERNAL MEDICINE

## 2025-04-01 PROCEDURE — 82947 ASSAY GLUCOSE BLOOD QUANT: CPT

## 2025-04-01 PROCEDURE — 85025 COMPLETE CBC W/AUTO DIFF WBC: CPT

## 2025-04-01 PROCEDURE — 94640 AIRWAY INHALATION TREATMENT: CPT

## 2025-04-01 PROCEDURE — 36415 COLL VENOUS BLD VENIPUNCTURE: CPT

## 2025-04-01 PROCEDURE — 99239 HOSP IP/OBS DSCHRG MGMT >30: CPT | Performed by: INTERNAL MEDICINE

## 2025-04-01 RX ORDER — NITROGLYCERIN 0.4 MG/1
0.4 TABLET SUBLINGUAL EVERY 5 MIN PRN
Qty: 25 TABLET | Refills: 3 | Status: SHIPPED | OUTPATIENT
Start: 2025-04-01

## 2025-04-01 RX ORDER — IPRATROPIUM BROMIDE AND ALBUTEROL SULFATE 2.5; .5 MG/3ML; MG/3ML
1 SOLUTION RESPIRATORY (INHALATION)
Status: DISCONTINUED | OUTPATIENT
Start: 2025-04-01 | End: 2025-04-01 | Stop reason: HOSPADM

## 2025-04-01 RX ORDER — MIDODRINE HYDROCHLORIDE 2.5 MG/1
2.5 TABLET ORAL ONCE
Status: COMPLETED | OUTPATIENT
Start: 2025-04-01 | End: 2025-04-01

## 2025-04-01 RX ADMIN — MIDODRINE HYDROCHLORIDE 2.5 MG: 2.5 TABLET ORAL at 01:05

## 2025-04-01 RX ADMIN — MICONAZOLE NITRATE: 20 POWDER TOPICAL at 08:38

## 2025-04-01 RX ADMIN — RANOLAZINE 500 MG: 500 TABLET, EXTENDED RELEASE ORAL at 08:38

## 2025-04-01 RX ADMIN — SODIUM CHLORIDE, PRESERVATIVE FREE 10 ML: 5 INJECTION INTRAVENOUS at 08:39

## 2025-04-01 RX ADMIN — ATORVASTATIN CALCIUM 40 MG: 40 TABLET, FILM COATED ORAL at 08:38

## 2025-04-01 RX ADMIN — DOCUSATE SODIUM 100 MG: 100 CAPSULE, LIQUID FILLED ORAL at 08:38

## 2025-04-01 RX ADMIN — IPRATROPIUM BROMIDE AND ALBUTEROL SULFATE 1 DOSE: .5; 2.5 SOLUTION RESPIRATORY (INHALATION) at 06:50

## 2025-04-01 RX ADMIN — ALOGLIPTIN 12.5 MG: 12.5 TABLET, FILM COATED ORAL at 08:37

## 2025-04-01 RX ADMIN — ASPIRIN 81 MG: 81 TABLET, COATED ORAL at 08:37

## 2025-04-01 RX ADMIN — MIDODRINE HYDROCHLORIDE 2.5 MG: 2.5 TABLET ORAL at 17:27

## 2025-04-01 RX ADMIN — PANTOPRAZOLE SODIUM 40 MG: 40 TABLET, DELAYED RELEASE ORAL at 08:38

## 2025-04-01 RX ADMIN — INSULIN GLARGINE 50 UNITS: 100 INJECTION, SOLUTION SUBCUTANEOUS at 08:37

## 2025-04-01 RX ADMIN — INSULIN LISPRO 8 UNITS: 100 INJECTION, SOLUTION INTRAVENOUS; SUBCUTANEOUS at 09:05

## 2025-04-01 RX ADMIN — EMPAGLIFLOZIN 10 MG: 10 TABLET, FILM COATED ORAL at 08:38

## 2025-04-01 RX ADMIN — BUDESONIDE AND FORMOTEROL FUMARATE DIHYDRATE 2 PUFF: 160; 4.5 AEROSOL RESPIRATORY (INHALATION) at 06:50

## 2025-04-01 RX ADMIN — INSULIN LISPRO 8 UNITS: 100 INJECTION, SOLUTION INTRAVENOUS; SUBCUTANEOUS at 12:24

## 2025-04-01 RX ADMIN — ISOSORBIDE MONONITRATE 60 MG: 60 TABLET, EXTENDED RELEASE ORAL at 08:37

## 2025-04-01 RX ADMIN — ENOXAPARIN SODIUM 40 MG: 100 INJECTION SUBCUTANEOUS at 08:38

## 2025-04-01 RX ADMIN — METOPROLOL TARTRATE 12.5 MG: 25 TABLET, FILM COATED ORAL at 08:37

## 2025-04-01 RX ADMIN — MIDODRINE HYDROCHLORIDE 2.5 MG: 2.5 TABLET ORAL at 08:38

## 2025-04-01 RX ADMIN — IPRATROPIUM BROMIDE AND ALBUTEROL SULFATE 1 DOSE: .5; 2.5 SOLUTION RESPIRATORY (INHALATION) at 14:06

## 2025-04-01 RX ADMIN — INSULIN LISPRO 4 UNITS: 100 INJECTION, SOLUTION INTRAVENOUS; SUBCUTANEOUS at 17:27

## 2025-04-01 RX ADMIN — TICAGRELOR 90 MG: 90 TABLET ORAL at 08:37

## 2025-04-01 NOTE — CARE COORDINATION
Case Management Assessment  Initial Evaluation    Date/Time of Evaluation: 4/1/2025 12:35 PM  Assessment Completed by: ADOLFO Piña    If patient is discharged prior to next notation, then this note serves as note for discharge by case management.    Patient Name: Mariangel Abreu                   YOB: 1950  Diagnosis: S/P cardiac catheterization [Z98.890]                   Date / Time: 3/31/2025  6:41 PM    Patient Admission Status: Inpatient   Readmission Risk (Low < 19, Mod (19-27), High > 27): Readmission Risk Score: 26    Current PCP: Nuzhat Ramos DTR  PCP verified by CM? No    Chart Reviewed: Yes      History Provided by: Patient  Patient Orientation: Alert and Oriented    Patient Cognition: Alert    Hospitalization in the last 30 days (Readmission):  Yes    If yes, Readmission Assessment in CM Navigator will be completed.    Advance Directives:      Code Status: Full Code   Patient's Primary Decision Maker is: Legal Next of Kin    Primary Decision Maker (Active): David Waller - Other Relative - [phone]    Discharge Planning:    Patient lives with: Alone Type of Home: Apartment  Primary Care Giver: Self  Patient Support Systems include: Family Members   Current Financial resources: Medicare  Current community resources: None  Current services prior to admission: C-pap, Home Care            Current DME:              Type of Home Care services:  Aide Services    ADLS  Prior functional level: Independent in ADLs/IADLs  Current functional level: Independent in ADLs/IADLs    PT AM-PAC:   /24  OT AM-PAC:   /24    Family can provide assistance at DC: Yes  Would you like Case Management to discuss the discharge plan with any other family members/significant others, and if so, who? Yes  Plans to Return to Present Housing: Unknown at present  Other Identified Issues/Barriers to RETURNING to current housing:   Potential Assistance needed at discharge: Home Care            Potential

## 2025-04-01 NOTE — CARE COORDINATION
DISCHARGE PLANNING NOTE:    Writer met with pt for update, pt confirms [facility] has called and will meet pt at home tomorrow. MAY faxed to [facility]. Perfect[employer] message placed to [place] with [facility] to confirm discharge.    IMM letter provided to patient.  Patient offered four hours to make informed decision regarding appeal process; patient agreeable to discharge.     No further questions at this time.  Electronically signed by ADOLFO Piña on 4/1/2025 at 4:22 PM

## 2025-04-01 NOTE — PLAN OF CARE
Problem: Respiratory - Adult  Goal: Achieves optimal ventilation and oxygenation  4/1/2025 0656 by Siobhan Sabillon RCP  Outcome: Progressing

## 2025-04-01 NOTE — PROGRESS NOTES
[facility]  Progress Note                   Date:   4/1/2025  Patient name: Mariangel Abreu  Date of admission:  3/31/2025  6:41 PM  MRN:   [id number]  YOB: 1950  PCP: Nuzhat Ramos DTR    Reason for Admission: S/P cardiac catheterization [Z98.890]    Subjective:       Clinical Changes /Abnormalities: pt. Seen and examined alone in room. Labs, vitals, & tele reviewed. Cath yesterday reviewed with patient as below. No post cath site issues/concerns. Radial site CDI and dressing removed    Review of Systems    Medications:   Scheduled Meds:   ipratropium 0.5 mg-albuterol 2.5 mg  1 Dose Inhalation TID RT    sodium chloride flush  5-40 mL IntraVENous 2 times per day    enoxaparin  40 mg SubCUTAneous Daily    alogliptin  12.5 mg Oral Daily    aspirin  81 mg Oral Daily    atorvastatin  40 mg Oral Daily    budesonide-formoterol  2 puff Inhalation BID RT    empagliflozin  10 mg Oral Daily    docusate sodium  100 mg Oral BID    escitalopram  20 mg Oral Nightly    insulin glargine  50 Units SubCUTAneous QAM    And    insulin glargine  45 Units SubCUTAneous Nightly    isosorbide mononitrate  60 mg Oral Daily    metoprolol tartrate  12.5 mg Oral BID    midodrine  2.5 mg Oral BID WC    miconazole   Topical BID    oxyBUTYnin  5 mg Oral Nightly    pantoprazole  40 mg Oral BID    ranolazine  500 mg Oral BID    ticagrelor  90 mg Oral BID    topiramate  100 mg Oral Nightly    insulin lispro  0-16 Units SubCUTAneous 4x Daily AC & HS     Continuous Infusions:   sodium chloride      dextrose       CBC:   No results for input(s): \"WBC\", \"HGB\", \"PLT\" in the last 72 hours.    BMP:    No results for input(s): \"NA\", \"K\", \"CL\", \"CO2\", \"BUN\", \"CREATININE\", \"GLUCOSE\" in the last 72 hours.    Hepatic:  No results for input(s): \"AST\", \"ALT\", \"BILITOT\", \"ALKPHOS\" in the last 72 hours.    Invalid input(s): \"ALB\"    Troponin:   No results for input(s): \"TROPHS\" in the last 72 hours.    BNP: No results for

## 2025-04-01 NOTE — DISCHARGE INSTR - COC
Continuity of Care Form    Patient Name: Mariangel Abreu   :  1950  MRN:  [id number]    Admit date:  3/31/2025  Discharge date:  25    Code Status Order: Full Code   Advance Directives:     Admitting Physician:  Jhony Hoyos MD  PCP: Nuzhat Ramos DTR    Discharging Nurse: Eduarda BLAIR  Discharging Hospital Unit/Room#: /-01  Discharging Unit Phone Number: [phone]    Emergency Contact:   Extended Emergency Contact Information  Primary Emergency Contact: ENRICO KIRKLAND  Address: [facility]  Home Phone: [phone]  Relation: Grandchild  Secondary Emergency Contact: Yvonne Kirkland  Home Phone: [phone]  Relation: Grandchild    Past Surgical History:  Past Surgical History:   Procedure Laterality Date    ABLATION OF DYSRHYTHMIC FOCUS      CARDIAC PROCEDURE N/A 2024    klarissa / Left heart cath / coronary angiography / stemi er 22 performed by Victoriano Dee MD at [facility] CARDIAC CATH LAB    CARDIAC PROCEDURE N/A 3/11/2024    klarissa / Percutaneous coronary intervention performed by Victoriano Dee MD at [facility] CARDIAC CATH LAB    CARPAL TUNNEL RELEASE Right     CATARACT REMOVAL WITH IMPLANT Bilateral     CHOLECYSTECTOMY      COLONOSCOPY      COLONOSCOPY  04/10/2017    tubular adenomo polyp , mod. sigmoid diverticulosis, min. int. hemorrhoids    COLONOSCOPY N/A 3/2/2021    COLONOSCOPY WITH BIOPSY performed by Moris Brenner MD at [facility] ENDO    CYSTOSCOPY  2013    DILATION AND CURETTAGE  1979    EYE SURGERY      laser    INCONTINENCE SURGERY      Percutaneous nerve stimulation Dr Ghosh 2013     INSERTABLE CARDIAC MONITOR  2015    [employer] REVEAL LINQ MODEL #[id number] MRI CONDTIONAL OK 3T. 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    RUE numbness R20.0    Chronic use of opiate for therapeutic purpose Z79.891    Chronic vulvitis N76.3    Angina pectoris I20.9    COPD exacerbation ([facility]) J44.1    Type 2 diabetes mellitus with hyperosmolar nonketotic hyperglycemia ([facility]) E11.00    Leakage of aortic graft T82.330A    Uncontrolled type 2 diabetes mellitus with hyperglycemia ([facility]) E11.65    Chest pressure R07.89    Microhematuria R31.29    Right kidney stone N20.0    Renal cyst, left N28.1    Nocturia R35.1    Dental infection K04.7    Acute kidney failure, unspecified N17.9    Allergic rhinitis, unspecified J30.9    Asymptomatic varicose veins of unspecified lower extremity I83.90    Dependence on other enabling machines and devices Z99.89    Diaphragmatic hernia without obstruction or gangrene K44.9    Difficulty in walking, not elsewhere classified R26.2    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, or end stage renal disease I13.2    Muscle weakness (generalized) M62.81    Other specified diseases of blood and blood-forming organs D75.89    Presence of intraocular lens Z96.1    Tinnitus, unspecified ear H93.19    Unspecified hearing loss, bilateral H91.93    STEMI (ST elevation myocardial infarction) ([facility]) I21.3    Acquired absence of other specified parts of digestive tract Z90.49    Long term (current) use of anticoagulants Z79.01    Nonrheumatic mitral (valve) insufficiency I34.0    Partially vaccinated for covid-19 Z28.311    History of colonic polyps Z86.0100    Personal history of pneumonia (recurrent) Z87.01    S/P coronary artery stent placement Z95.5    Personal history of urinary calculi Z87.442

## 2025-04-01 NOTE — CARE COORDINATION
DISCHARGE PLANNING NOTE:    Call placed to bedside RN Eduarda to confirm pt is able to return home with [facility]. Eduarda confirms pt has ride home later this evening.  Electronically signed by ADOLFO Piña on 4/1/2025 at 4:00 PM

## 2025-04-01 NOTE — PROGRESS NOTES
[facility] PULMONARY,CRITICAL CARE & SLEEP   Alfredocaleb Kim MD/Eddie BANDA AGAANGELIQUEP-BC, NP-C      Marianna BANDA NP-C     Meliton BANDA NP-C                                          Pulmonary Progress Note    Patient - Mariangel Abreu   Age - 74 y.o.   - 1950  MRN - [id number]  Acct # - [id number]  Date of Admission - 3/31/2025  6:41 PM    Consulting Service/Physician:       Primary Care Physician: Nuzhat Ramos DTR    SUBJECTIVE:     Chief Complaint:   No chief complaint on file.    Subjective:    Mariangel is seen sitting up in a chair.  She had heart cath yesterday with a stent placed to the distal LAD.  Plan for a staged PCI in 4 to 6 weeks of OM1/OM 2 per cardiology note.  She denies any current pulmonary complaints.  She tells me plan is for discharge home.  She has her BiPAP at home, she has oxygen at home and has her inhalers at home.    VITALS  /68   Pulse 72   Temp 98.1 °F (36.7 °C) (Oral)   Resp 16   Wt 92 kg (202 lb 13.2 oz)   LMP  (LMP Unknown)   SpO2 95%   BMI 35.49 kg/m²   Wt Readings from Last 3 Encounters:   25 92 kg (202 lb 13.2 oz)   25 93.6 kg (206 lb 5.6 oz)   25 100.2 kg (221 lb)     I/O (24 Hours)    Intake/Output Summary (Last 24 hours) at 2025 1247  Last data filed at 2025 0503  Gross per 24 hour   Intake 360 ml   Output 200 ml   Net 160 ml     Ventilator:   Settings  FiO2 : 30 %  Exam:   Physical Exam   Constitutional: Obese female sitting up in chair on 2 L in no distress  HENT: Unremarkable  Head: Normocephalic and atraumatic.   Eyes: EOM are normal. Pupils are equal, round, and reactive to light.   Neck: Neck supple.   Cardiovascular:  Regular rate and rhythm.  Normal heart tones.  No JVD.    Pulmonary/Chest: Respirations even and unlabored, lungs slightly diminished, on 2 L with pulse ox 95%   Abdominal: Soft. Bowel sounds are normal.   Musculoskeletal: Normal range of motion.  middle lobe nodule, 7-8 mm unchanged since 2020  Obstructive sleep apnea, has BiPAP device at home, has not been seen in the office since 2023, previously she had been maintained on 18/9, gets her supplies through [employer] however had not been compliant at her last office visit  Chronic heart failure preserved EF, EF 55-60%  Chest pain-substernal/unstable angina recurrent-relieved with sublingual nitro and GI cocktail, last Lexiscan in January, 2025 showed anteroapical reversible defect compatible with ischemia  History of coronary artery disease with PCI to mid RCA in 2024, residual high-grade LAD diagonal stenosis at that time  History of tobacco use, quitting in 2000  Essential hypertension  Diabetes mellitus type 2  Chronic kidney disease  Obese  Full code  PLAN:   No objection to discharge home from pulmonary standpoint  Continue with oxygen, wean to keep pulse ox above 89%, has oxygen at home  To continue with her home BiPAP after discharge  Continue with her routine inhalers  Follow-up in our office in 4 to 6 weeks, [phone]      Electronically signed by VERONIKA Beckham - CNP on 04/01/25     This progress note was completed using a voice transcription system. Every effort was made to ensure accuracy. However, inadvertent computerized transcription errors may be present.    Marianna Lafleur, NP-C, MSN  [facility] Critical [facility]

## 2025-04-01 NOTE — CARE COORDINATION
DISCHARGE PLANNING NOTE:    Writer met with pt to discuss discharge plans. Pt states she wishes to return home with VNS at this time.    Writer informed pt this will be verified with RN ANNMARIE Morrissey to verify there are no barriers to discharge.  Electronically signed by ADOLFO Piña on 4/1/2025 at 3:06 PM

## 2025-04-01 NOTE — RT PROTOCOL NOTE
RT Inhaler-Nebulizer Bronchodilator Protocol Note    There is a bronchodilator order in the chart from a provider indicating to follow the RT Bronchodilator Protocol and there is an “Initiate RT Inhaler-Nebulizer Bronchodilator Protocol” order as well (see protocol at bottom of note).    CXR Findings:  No results found.    The findings from the last RT Protocol Assessment were as follows:   History Pulmonary Disease: Chronic pulmonary disease  Respiratory Pattern: Dyspnea on exertion or RR 21-25 bpm  Breath Sounds: Intermittent or unilateral wheezes  Cough: Strong, productive  Indication for Bronchodilator Therapy:    Bronchodilator Assessment Score: 9    Aerosolized bronchodilator medication orders have been revised according to the RT Inhaler-Nebulizer Bronchodilator Protocol below.    Respiratory Therapist to perform RT Therapy Protocol Assessment initially then follow the protocol.  Repeat RT Therapy Protocol Assessment PRN for score 0-3 or on second treatment, BID, and PRN for scores above 3.    No Indications - adjust the frequency to every 6 hours PRN wheezing or bronchospasm, if no treatments needed after 48 hours then discontinue using Per Protocol order mode.     If indication present, adjust the RT bronchodilator orders based on the Bronchodilator Assessment Score as indicated below.  Use Inhaler orders unless patient has one or more of the following: on home nebulizer, not able to hold breath for 10 seconds, is not alert and oriented, cannot activate and use MDI correctly, or respiratory rate 25 breaths per minute or more, then use the equivalent nebulizer order(s) with same Frequency and PRN reasons based on the score.  If a patient is on this medication at home then do not decrease Frequency below that used at home.    0-3 - enter or revise RT bronchodilator order(s) to equivalent RT Bronchodilator order with Frequency of every 4 hours PRN for wheezing or increased work of breathing using Per Protocol  order mode.        4-6 - enter or revise RT Bronchodilator order(s) to two equivalent RT bronchodilator orders with one order with BID Frequency and one order with Frequency of every 4 hours PRN wheezing or increased work of breathing using Per Protocol order mode.        7-10 - enter or revise RT Bronchodilator order(s) to two equivalent RT bronchodilator orders with one order with TID Frequency and one order with Frequency of every 4 hours PRN wheezing or increased work of breathing using Per Protocol order mode.       11-13 - enter or revise RT Bronchodilator order(s) to one equivalent RT bronchodilator order with QID Frequency and an Albuterol order with Frequency of every 4 hours PRN wheezing or increased work of breathing using Per Protocol order mode.      Greater than 13 - enter or revise RT Bronchodilator order(s) to one equivalent RT bronchodilator order with every 4 hours Frequency and an Albuterol order with Frequency of every 2 hours PRN wheezing or increased work of breathing using Per Protocol order mode.     RT to enter RT Home Evaluation for COPD & MDI Assessment order using Per Protocol order mode.    Electronically signed by Siobhan Sabillon RCP on 4/1/2025 at 6:57 AM

## 2025-04-01 NOTE — PLAN OF CARE
Problem: Chronic Conditions and Co-morbidities  Goal: Patient's chronic conditions and co-morbidity symptoms are monitored and maintained or improved  Outcome: Completed     Problem: Discharge Planning  Goal: Discharge to home or other facility with appropriate resources  4/1/2025 1436 by Eduarda Engle RN  Outcome: Completed     Problem: Safety - Adult  Goal: Free from fall injury  4/1/2025 1436 by Eduarda Engle RN  Outcome: Completed     Problem: Respiratory - Adult  Goal: Achieves optimal ventilation and oxygenation  4/1/2025 1436 by Eduarda Egnle RN  Outcome: Completed     Problem: Cardiovascular - Adult  Goal: Maintains optimal cardiac output and hemodynamic stability  4/1/2025 1436 by Eduarda Engle RN  Outcome: Completed     Problem: Cardiovascular - Adult  Goal: Absence of cardiac dysrhythmias or at baseline  4/1/2025 1436 by Eduarda Engle RN  Outcome: Completed

## 2025-04-01 NOTE — DISCHARGE SUMMARY
tablet  Commonly known as: PROTONIX     ranolazine 500 MG extended release tablet  Commonly known as: RANEXA  Take 1 tablet by mouth 2 times daily     topiramate 100 MG tablet  Commonly known as: TOPAMAX  GIVE 1 TABLET BY MOUTH ONE TIME A DAY FOR SEIZURES           * This list has 2 medication(s) that are the same as other medications prescribed for you. Read the directions carefully, and ask your doctor or other care provider to review them with you.                  No discharge procedures on file.    Time Spent on discharge is  32 mins in patient examination, evaluation, counseling as well as medication reconciliation, prescriptions for required medications, discharge plan and follow up.    Electronically signed by   Rose Buckley MD  4/1/2025  3:15 PM      Thank you Nuzhat Meehan, RENEE for the opportunity to be involved in this patient's care.    Please note that this chart was generated using voice recognition Dragon dictation software.  Although every effort was made to ensure the accuracy of this automated transcription, some errors in transcription may have occurred.

## 2025-04-01 NOTE — PROGRESS NOTES
IV and telemetry removed. AVS reviewed in detail. Pt packed all belongings and was wheeled out to lobby by request to wait for granddaughter to pick her up.

## 2025-04-01 NOTE — PLAN OF CARE
Problem: Discharge Planning  Goal: Discharge to home or other facility with appropriate resources  Outcome: Progressing     Problem: Safety - Adult  Goal: Free from fall injury  Outcome: Progressing     Problem: Respiratory - Adult  Goal: Achieves optimal ventilation and oxygenation  Outcome: Progressing     Problem: Cardiovascular - Adult  Goal: Maintains optimal cardiac output and hemodynamic stability  Outcome: Progressing  Goal: Absence of cardiac dysrhythmias or at baseline  Outcome: Progressing

## 2025-04-01 NOTE — PROGRESS NOTES
CLINICAL PHARMACY NOTE: MEDS TO BEDS    Total # of Prescriptions Filled: 1   The following medications were delivered to the patient:  Nitroglycerin 0.4MG Sublingual Tablets     Additional Documentation:  Delivered to the PT at 3:45PM 4/1/25

## 2025-04-01 NOTE — CARE COORDINATION
Continuity of Care Form    Patient Name: Mariangel Abreu   :  1950  MRN:  [id number]    Admit date:  3/31/2025  Discharge date:  25    Code Status Order: Full Code   Advance Directives:     Admitting Physician:  Jhony Hoyos MD  PCP: Nuzhat Ramos DTR    Discharging Nurse: Eduarda BLAIR  Discharging Hospital Unit/Room#: /-01  Discharging Unit Phone Number: [phone]    Emergency Contact:   Extended Emergency Contact Information  Primary Emergency Contact: ENRICO KIRKLAND  Address: [facility]  Home Phone: [phone]  Relation: Grandchild  Secondary Emergency Contact: Yvonne Kirkland  Home Phone: [phone]  Relation: Grandchild    Past Surgical History:  Past Surgical History:   Procedure Laterality Date    ABLATION OF DYSRHYTHMIC FOCUS      CARDIAC PROCEDURE N/A 2024    klarissa / Left heart cath / coronary angiography / stemi er 22 performed by Victoriano Dee MD at [facility] CARDIAC CATH LAB    CARDIAC PROCEDURE N/A 3/11/2024    klarissa / Percutaneous coronary intervention performed by Victoriano Dee MD at [facility] CARDIAC CATH LAB    CARPAL TUNNEL RELEASE Right     CATARACT REMOVAL WITH IMPLANT Bilateral     CHOLECYSTECTOMY      COLONOSCOPY      COLONOSCOPY  04/10/2017    tubular adenomo polyp , mod. sigmoid diverticulosis, min. int. hemorrhoids    COLONOSCOPY N/A 3/2/2021    COLONOSCOPY WITH BIOPSY performed by Moris Brenner MD at [facility] ENDO    CYSTOSCOPY  2013    DILATION AND CURETTAGE  1979    EYE SURGERY      laser    INCONTINENCE SURGERY      Percutaneous nerve stimulation Dr Ghosh 2013     INSERTABLE CARDIAC MONITOR  2015    [employer] REVEAL LINQ MODEL #[id number] MRI CONDTIONAL OK 3T. IMMEDIATELY POST IMPLANT    OTHER SURGICAL HISTORY  09/10/15    removal of interstim    PA COLSC FLX W/REMOVAL LESION BY HOT BX FORCEPS N/A 4/10/2017    COLONOSCOPY POLYPECTOMY HOT BIOPSY performed by Ksenia Marroquin MD at [facility] OR    TONSILLECTOMY      TUBAL LIGATION    Weakness R53.1    History of ST elevation myocardial infarction (STEMI) I25.2    Coronary artery disease involving native coronary artery of native heart without angina pectoris I25.10    Secondary hypercoagulable state D68.69    Familial hypercholesterolemia E78.01    Claudication I73.9    Type 2 diabetes mellitus with hyperglycemia (HCC) E11.65    Hyperglycemia R73.9    CAD S/P percutaneous coronary angioplasty I25.10, Z98.61    TIA (transient ischemic attack) G45.9    Facial pain, acute R51.9    Type 2 diabetes mellitus with hyperglycemia, with long-term current use of insulin (HCC) E11.65, Z79.4    Uncontrolled blood glucose R73.09    Elevated erythrocyte sedimentation rate R70.0    Elevated C-reactive protein (CRP) R79.82    Chest pain R07.9    Sprain of temporomandibular joint or ligament S03.40XA    Neck pain M54.2    Unstable angina (HCC) I20.0    Syncope and collapse R55    Moderate malnutrition E44.0    S/P cardiac catheterization Z98.890       Isolation/Infection:   Isolation            No Isolation          Patient Infection Status    None to display              Nurse Assessment:  Last Vital Signs: /68   Pulse 72   Temp 98.1 °F (36.7 °C) (Oral)   Resp 16   Wt 92 kg (202 lb 13.2 oz)   LMP  (LMP Unknown)   SpO2 94%   BMI 35.49 kg/m²     Last documented pain score (0-10 scale): Pain Level: 8  Last Weight:   Wt Readings from Last 1 Encounters:   04/01/25 92 kg (202 lb 13.2 oz)     Mental Status:  oriented, alert, coherent, logical, thought processes intact, and able to concentrate and follow conversation    IV Access:  - None    Nursing Mobility/ADLs:  Walking   Assisted  Transfer  Assisted  Bathing  Assisted  Dressing  Assisted  Toileting  Assisted  Feeding  Independent  Med Admin  Independent  Med Delivery   whole    Wound Care Documentation and Therapy:        Elimination:  Continence:   Bowel: Yes  Bladder: Yes  Urinary Catheter: None   Colostomy/Ileostomy/Ileal Conduit: No       Date of

## 2025-04-01 NOTE — DISCHARGE SUMMARY
[facility] Internal Medicine    Mejia Kelly MD; Clifford Albrecht MD, Adi Singh MD, Ivana Orellana MD,Dr. IGNACIO Hoyos MD. ; Rose Buckley MD      [facility] Internal Medicine  IN-PATIENT SERVICE   [facility]    Discharge Summary     Patient ID: Mariangel Abreu  :  1950   MRN: [id number]     ACCOUNT:  [id number]   Patient's PCP: Nuzhat Ramos DTR  Admit Date: 3/31/2025   Discharge Date: 2025     Length of Stay: 1  Code Status:  Full Code  Admitting Physician: Jhony Hoyos MD  Discharge Physician: Rose Buckley MD     Active Discharge Diagnoses:     Hospital Problem Lists:  Principal Problem:    S/P cardiac catheterization  Resolved Problems:    * No resolved hospital problems. *      Admission Condition:  Serious      Discharged Condition: Stable     Hospital Stay:     Hospital Course:    74-year-old female with underlying history of coronary disease with 4 stents admitted with unstable angina, EKG and troponin reviewed, cardiology on board, continue aspirin, Brilinta statins beta-blockers, patient cath in 2024 with stent placement, echo showed preserved left ventricular ejection fraction, patient does not look like have atrial fibrillation, was in her problem list, cardiology to evaluate  Multiple falls with lower extremity weakness was post to be evaluated by neurology, will get outpatient follow-up with neurology and imaging,  Morbid obesity, low-calorie diet,  COPD with obstructive sleep apnea, pulmonary on board,  Type 2 diabetes, sugars ACHS  Chronic pain syndrome, takes Percocet at home, will continue the home dose,  Lung masses, nodular opacities, rule out malignancy,  Cardiology did cath on   Cardiology planning staged PCI   Okay to dc as per them now   Lung nodules,   Appt with Pulm as out with Dr Tyler advised and order placed in discharge packet         Review of system:  Denies any nausea vomiting fever chills,  Denies

## 2025-04-09 ENCOUNTER — HOSPITAL ENCOUNTER (OUTPATIENT)
Dept: PAIN MANAGEMENT | Age: 75
Discharge: HOME | End: 2025-04-09
Payer: MEDICARE

## 2025-04-09 ENCOUNTER — HOSPITAL ENCOUNTER (OUTPATIENT)
Age: 75
Discharge: HOME | End: 2025-04-09
Payer: MEDICARE

## 2025-04-09 VITALS — BODY MASS INDEX: 35.79 KG/M2 | HEIGHT: 63 IN | WEIGHT: 202 LBS

## 2025-04-09 DIAGNOSIS — M54.41 CHRONIC BILATERAL LOW BACK PAIN WITH BILATERAL SCIATICA: Primary | Chronic | ICD-10-CM

## 2025-04-09 DIAGNOSIS — M54.16 LUMBAR RADICULOPATHY, CHRONIC: ICD-10-CM

## 2025-04-09 DIAGNOSIS — M47.817 LUMBOSACRAL SPONDYLOSIS WITHOUT MYELOPATHY: Chronic | ICD-10-CM

## 2025-04-09 DIAGNOSIS — N18.30 STAGE 3 CHRONIC KIDNEY DISEASE, UNSPECIFIED WHETHER STAGE 3A OR 3B CKD (HCC): ICD-10-CM

## 2025-04-09 DIAGNOSIS — M54.42 CHRONIC BILATERAL LOW BACK PAIN WITH BILATERAL SCIATICA: Primary | Chronic | ICD-10-CM

## 2025-04-09 DIAGNOSIS — G89.29 CHRONIC BILATERAL LOW BACK PAIN WITH BILATERAL SCIATICA: Primary | Chronic | ICD-10-CM

## 2025-04-09 DIAGNOSIS — Z79.891 CHRONIC USE OF OPIATE FOR THERAPEUTIC PURPOSE: ICD-10-CM

## 2025-04-09 DIAGNOSIS — M50.30 DDD (DEGENERATIVE DISC DISEASE), CERVICAL: Chronic | ICD-10-CM

## 2025-04-09 DIAGNOSIS — M46.1 SACROILIITIS, NOT ELSEWHERE CLASSIFIED: Chronic | ICD-10-CM

## 2025-04-09 PROCEDURE — 36415 COLL VENOUS BLD VENIPUNCTURE: CPT

## 2025-04-09 PROCEDURE — 99213 OFFICE O/P EST LOW 20 MIN: CPT | Performed by: NURSE PRACTITIONER

## 2025-04-09 PROCEDURE — 99213 OFFICE O/P EST LOW 20 MIN: CPT

## 2025-04-09 RX ORDER — OXYCODONE AND ACETAMINOPHEN 5; 325 MG/1; MG/1
1 TABLET ORAL 2 TIMES DAILY PRN
Qty: 60 TABLET | Refills: 0 | Status: SHIPPED | OUTPATIENT
Start: 2025-04-09 | End: 2025-05-09

## 2025-04-09 ASSESSMENT — ENCOUNTER SYMPTOMS
SHORTNESS OF BREATH: 0
CONSTIPATION: 0
BACK PAIN: 1
COUGH: 0

## 2025-04-09 NOTE — PROGRESS NOTES
Chief Complaint   Patient presents with    Back Pain     Med refill         PMH    Hx of atrial fibrillation, CKD stage III, CHF, COPD, DM CVA MI with 4 stents and on brilinta  chronic hypoxic respiratory failure and HIGINIO on CPAP - has narcan 4/16/2024     Pt c/o low back pain that radiates down legs. No known injury or surgery to the area with onset more than 1 year ago and has progressively worsened  MRI 10- with multilevel degenerative changes and Right paracentral disc extrusion L4-5 narrowing the right lateral recess.   She is not interested in seeing NS for opinion and declines to update imaging even though reporting worsening leg weakness and falls.    Patient states that she had a bad experience with an injection in the past and is not interested in any interventional pain procedures     Recent hospital admission for chest pain- was in SNF for two weeks for rehab.   She did have a fall at home due to weakness in her legs. Will be seeing neurology.   CT lumbar and cervical spine completed in ER in Feb with multilevel degenerative changes.   On home O2 only PRN    Back Pain  This is a chronic problem. The current episode started more than 1 year ago. The problem is unchanged. The pain is present in the lumbar spine. The quality of the pain is described as aching (throbbing, sharp). The pain radiates to the right knee, left thigh, left knee, right thigh, left foot and right foot. The pain is at a severity of 7/10. The pain is moderate. The pain is Worse during the night. The symptoms are aggravated by bending, standing and position. Stiffness is present In the morning. Associated symptoms include leg pain, numbness and weakness. Pertinent negatives include no chest pain, fever, headaches or tingling. She has tried heat, ice and analgesics for the symptoms. The treatment provided mild relief.     Patient denies any new neurological symptoms. No bowel or bladder incontinence, no weakness, and no

## 2025-04-10 PROBLEM — I25.10 CORONARY ARTERY DISEASE DUE TO CALCIFIED CORONARY LESION: Status: ACTIVE | Noted: 2025-04-01

## 2025-04-10 PROBLEM — I25.84 CORONARY ARTERY DISEASE DUE TO CALCIFIED CORONARY LESION: Status: ACTIVE | Noted: 2025-04-01

## 2025-04-10 NOTE — PROGRESS NOTES
Physician Progress Note      PATIENT:               NO BARLOW  [facility] #:                  [id number]  :                       1950  ADMIT DATE:       3/28/2025 1:07 PM  DISCH DATE:        3/31/2025 12:30 PM  RESPONDING  PROVIDER #:        Rose Buckley MD          QUERY TEXT:    Chest pain is documented in the medical record in D/C note on 3-31 by Rose Buckley MD. Please specify the underlying cause:    The clinical indicators include:  Patient presented with Chest pain. 74-year-old female with underlying history   of coronary disease with 4 stents admitted with unstable angina, EKG and   troponin reviewed, cardiology on board, continue aspirin, Brilinta statins   beta-blockers, patient cath in 2024 with stent placement, echo showed   preserved left ventricular ejection fraction, patient does not look like have   atrial fibrillation, was in her problem list, cardiology to evaluate.  She   stated that after going to CT she began to have chest pain and pressure in the   mid sternal region, was given 1 dose of 0.4 mg   Continue PO ASA, statin, Brilinta, Ranexa, BB, & Imdur, Planned C with   cardio Cardiology planning to do cath on ,  discharge readmit/Going to   [facility] for left heart cath  Options provided:  -- Chest pain due to CAD with unstable angina  -- Chest pain is do to other, Please document evidence.  -- Other - I will add my own diagnosis  -- Disagree - Not applicable / Not valid  -- Disagree - Clinically unable to determine / Unknown  -- Refer to Clinical Documentation Reviewer    PROVIDER RESPONSE TEXT:    This patient has Chest pain due to CAD with unstable angina    Query created by: Zamzam Malik on 4/10/2025 8:06 AM      QUERY TEXT:    Based on your medical judgment, please clarify these findings and document if   any of the following are being evaluated and/or treated:    The clinical indicators include:  The medical record reflects the following:  Risk Factors:

## 2025-04-24 NOTE — TELEPHONE ENCOUNTER
[address] Vel[address] Crespo called and was notified that PCP will follow for home health. In an effort to ensure that our patients [employer], a Team Member has reviewed your chart and identified an opportunity to provide the best care possible. An attempt was made to discuss or schedule due or overdue Preventive or Chronic Condition care.Care Gaps identified: Wellness Visits.    The Outcome was Contact was made, a Medicare Wellness Visit was scheduled.   Type of Appointment needed: Medicare Wellness Visit    Name: VALERIE DRAPER    ### Patient Details  YOB: 1970  MRN: [id number]    ### Encounter Details  Arrival Date: N/A  Discharge Date: N/A  Encounter ID: [id number]    ### Related interaction  [facility] [place] Annual Wellness Outreach (Annual Well[place] Outreach ([place] ([email or web address])    ### Questions     Question 1   Annual Wellness   If you would like to speak with someone now to schedule your wellness visit, press 1; if you would like us to call you back later to schedule your wellness visit, press 2; if you would prefer not to schedule an appointment at this time, please press 3, or if you've already scheduled a wellness visit this year press 4.   Callback Requested (Issue Panel: Annual Wellness Visit Issue, Issue Alert: Wellness Intervention)     Question 2   Preferred Callback   To receive a call back in the morning, press 1; to receive a call back in the afternoon, press 2; if you do not have a preference of call times, press 3.   Morning Callback (Issue Panel: Annual Wellness Visit Issue)    ### Required Interventions and Feedback     Call Status         Call Status List:     Answered (edited by AO on 04/24/2025 10:14 AM CDT)     Medicare Wellness Visit Scheduling         Scheduled Medicare Wellness Visit Appointment:     Yes (edited by MILVIA on 04/24/2025 10:14 AM CDT)

## 2025-04-25 ENCOUNTER — APPOINTMENT (OUTPATIENT)
Dept: GENERAL RADIOLOGY | Age: 75
DRG: 313 | End: 2025-04-25
Attending: EMERGENCY MEDICINE
Payer: MEDICARE

## 2025-04-25 ENCOUNTER — HOSPITAL ENCOUNTER (INPATIENT)
Age: 75
LOS: 4 days | Discharge: ANOTHER HOSPITAL | DRG: 313 | End: 2025-04-29
Attending: EMERGENCY MEDICINE | Admitting: INTERNAL MEDICINE
Payer: MEDICARE

## 2025-04-25 DIAGNOSIS — R07.9 CHEST PAIN, UNSPECIFIED TYPE: Primary | ICD-10-CM

## 2025-04-25 LAB
ALBUMIN SERPL-MCNC: 3.8 G/DL (ref 3.5–5.2)
ALP SERPL-CCNC: 71 U/L (ref 35–104)
ALT SERPL-CCNC: 13 U/L (ref 10–35)
ANION GAP SERPL CALCULATED.3IONS-SCNC: 11 MMOL/L (ref 9–16)
AST SERPL-CCNC: 21 U/L (ref 10–35)
BASOPHILS # BLD: 0.1 K/UL (ref 0–0.2)
BASOPHILS NFR BLD: 2 % (ref 0–2)
BILIRUB SERPL-MCNC: 0.3 MG/DL (ref 0–1.2)
BUN SERPL-MCNC: 17 MG/DL (ref 8–23)
CALCIUM SERPL-MCNC: 9.1 MG/DL (ref 8.6–10.4)
CHLORIDE SERPL-SCNC: 99 MMOL/L (ref 98–107)
CO2 SERPL-SCNC: 25 MMOL/L (ref 20–31)
CREAT SERPL-MCNC: 1 MG/DL (ref 0.7–1.2)
EOSINOPHIL # BLD: 0.2 K/UL (ref 0–0.4)
EOSINOPHILS RELATIVE PERCENT: 3 % (ref 0–4)
ERYTHROCYTE [DISTWIDTH] IN BLOOD BY AUTOMATED COUNT: 16 % (ref 11.5–14.9)
GFR, ESTIMATED: 59 ML/MIN/1.73M2
GLUCOSE BLD-MCNC: 134 MG/DL (ref 65–105)
GLUCOSE SERPL-MCNC: 127 MG/DL (ref 74–99)
HCT VFR BLD AUTO: 41.5 % (ref 36–46)
HGB BLD-MCNC: 13.8 G/DL (ref 12–16)
LYMPHOCYTES NFR BLD: 1.6 K/UL (ref 1–4.8)
LYMPHOCYTES RELATIVE PERCENT: 20 % (ref 24–44)
MAGNESIUM SERPL-MCNC: 1.6 MG/DL (ref 1.6–2.4)
MCH RBC QN AUTO: 29.6 PG (ref 26–34)
MCHC RBC AUTO-ENTMCNC: 33.3 G/DL (ref 31–37)
MCV RBC AUTO: 88.8 FL (ref 80–100)
MONOCYTES NFR BLD: 0.7 K/UL (ref 0.1–1.3)
MONOCYTES NFR BLD: 9 % (ref 1–7)
NEUTROPHILS NFR BLD: 66 % (ref 36–66)
NEUTS SEG NFR BLD: 5.2 K/UL (ref 1.3–9.1)
PLATELET # BLD AUTO: 232 K/UL (ref 150–450)
PMV BLD AUTO: 9.4 FL (ref 6–12)
POTASSIUM SERPL-SCNC: 4.1 MMOL/L (ref 3.7–5.3)
PROT SERPL-MCNC: 7.1 G/DL (ref 6.6–8.7)
RBC # BLD AUTO: 4.68 M/UL (ref 4–5.2)
SODIUM SERPL-SCNC: 135 MMOL/L (ref 136–145)
TROPONIN I SERPL HS-MCNC: 12 NG/L (ref 0–14)
TROPONIN I SERPL HS-MCNC: 13 NG/L (ref 0–14)
WBC OTHER # BLD: 7.8 K/UL (ref 3.5–11)

## 2025-04-25 PROCEDURE — G0378 HOSPITAL OBSERVATION PER HR: HCPCS

## 2025-04-25 PROCEDURE — 85025 COMPLETE CBC W/AUTO DIFF WBC: CPT

## 2025-04-25 PROCEDURE — 71045 X-RAY EXAM CHEST 1 VIEW: CPT

## 2025-04-25 PROCEDURE — 36415 COLL VENOUS BLD VENIPUNCTURE: CPT

## 2025-04-25 PROCEDURE — 99285 EMERGENCY DEPT VISIT HI MDM: CPT

## 2025-04-25 PROCEDURE — 6360000002 HC RX W HCPCS

## 2025-04-25 PROCEDURE — 99223 1ST HOSP IP/OBS HIGH 75: CPT

## 2025-04-25 PROCEDURE — 83735 ASSAY OF MAGNESIUM: CPT

## 2025-04-25 PROCEDURE — 96374 THER/PROPH/DIAG INJ IV PUSH: CPT

## 2025-04-25 PROCEDURE — 6370000000 HC RX 637 (ALT 250 FOR IP): Performed by: NURSE PRACTITIONER

## 2025-04-25 PROCEDURE — 82947 ASSAY GLUCOSE BLOOD QUANT: CPT

## 2025-04-25 PROCEDURE — 94761 N-INVAS EAR/PLS OXIMETRY MLT: CPT

## 2025-04-25 PROCEDURE — 94660 CPAP INITIATION&MGMT: CPT

## 2025-04-25 PROCEDURE — 80053 COMPREHEN METABOLIC PANEL: CPT

## 2025-04-25 PROCEDURE — 94640 AIRWAY INHALATION TREATMENT: CPT

## 2025-04-25 PROCEDURE — 6370000000 HC RX 637 (ALT 250 FOR IP)

## 2025-04-25 PROCEDURE — 84484 ASSAY OF TROPONIN QUANT: CPT

## 2025-04-25 PROCEDURE — 6360000002 HC RX W HCPCS: Performed by: EMERGENCY MEDICINE

## 2025-04-25 PROCEDURE — 93005 ELECTROCARDIOGRAM TRACING: CPT | Performed by: EMERGENCY MEDICINE

## 2025-04-25 PROCEDURE — 1200000000 HC SEMI PRIVATE

## 2025-04-25 PROCEDURE — 2500000003 HC RX 250 WO HCPCS

## 2025-04-25 RX ORDER — MAGNESIUM SULFATE HEPTAHYDRATE 40 MG/ML
2000 INJECTION, SOLUTION INTRAVENOUS PRN
Status: DISCONTINUED | OUTPATIENT
Start: 2025-04-25 | End: 2025-04-29 | Stop reason: HOSPADM

## 2025-04-25 RX ORDER — ASPIRIN 81 MG/1
81 TABLET ORAL DAILY
Status: DISCONTINUED | OUTPATIENT
Start: 2025-04-26 | End: 2025-04-29 | Stop reason: HOSPADM

## 2025-04-25 RX ORDER — RANOLAZINE 500 MG/1
500 TABLET, EXTENDED RELEASE ORAL 2 TIMES DAILY
Status: DISCONTINUED | OUTPATIENT
Start: 2025-04-25 | End: 2025-04-29 | Stop reason: HOSPADM

## 2025-04-25 RX ORDER — ACETAMINOPHEN 650 MG/1
650 SUPPOSITORY RECTAL EVERY 6 HOURS PRN
Status: DISCONTINUED | OUTPATIENT
Start: 2025-04-25 | End: 2025-04-29 | Stop reason: HOSPADM

## 2025-04-25 RX ORDER — DEXTROSE MONOHYDRATE 100 MG/ML
INJECTION, SOLUTION INTRAVENOUS CONTINUOUS PRN
Status: DISCONTINUED | OUTPATIENT
Start: 2025-04-25 | End: 2025-04-29 | Stop reason: HOSPADM

## 2025-04-25 RX ORDER — ENOXAPARIN SODIUM 100 MG/ML
40 INJECTION SUBCUTANEOUS DAILY
Status: DISCONTINUED | OUTPATIENT
Start: 2025-04-25 | End: 2025-04-27 | Stop reason: CLARIF

## 2025-04-25 RX ORDER — OXYCODONE AND ACETAMINOPHEN 5; 325 MG/1; MG/1
1 TABLET ORAL ONCE
Refills: 0 | Status: COMPLETED | OUTPATIENT
Start: 2025-04-25 | End: 2025-04-25

## 2025-04-25 RX ORDER — DOCUSATE SODIUM 100 MG/1
100 CAPSULE, LIQUID FILLED ORAL 2 TIMES DAILY
Status: DISCONTINUED | OUTPATIENT
Start: 2025-04-25 | End: 2025-04-29 | Stop reason: HOSPADM

## 2025-04-25 RX ORDER — ONDANSETRON 4 MG/1
4 TABLET, ORALLY DISINTEGRATING ORAL EVERY 8 HOURS PRN
Status: DISCONTINUED | OUTPATIENT
Start: 2025-04-25 | End: 2025-04-29 | Stop reason: HOSPADM

## 2025-04-25 RX ORDER — POLYETHYLENE GLYCOL 3350 17 G/17G
17 POWDER, FOR SOLUTION ORAL DAILY PRN
Status: DISCONTINUED | OUTPATIENT
Start: 2025-04-25 | End: 2025-04-29 | Stop reason: HOSPADM

## 2025-04-25 RX ORDER — INSULIN GLARGINE 100 [IU]/ML
30 INJECTION, SOLUTION SUBCUTANEOUS 2 TIMES DAILY
Status: DISCONTINUED | OUTPATIENT
Start: 2025-04-25 | End: 2025-04-29 | Stop reason: HOSPADM

## 2025-04-25 RX ORDER — ONDANSETRON 2 MG/ML
4 INJECTION INTRAMUSCULAR; INTRAVENOUS EVERY 6 HOURS PRN
Status: DISCONTINUED | OUTPATIENT
Start: 2025-04-25 | End: 2025-04-29 | Stop reason: HOSPADM

## 2025-04-25 RX ORDER — ALBUTEROL SULFATE 90 UG/1
2 INHALANT RESPIRATORY (INHALATION) 4 TIMES DAILY PRN
Status: DISCONTINUED | OUTPATIENT
Start: 2025-04-25 | End: 2025-04-29 | Stop reason: HOSPADM

## 2025-04-25 RX ORDER — TOPIRAMATE 100 MG/1
100 TABLET, FILM COATED ORAL NIGHTLY
Status: DISCONTINUED | OUTPATIENT
Start: 2025-04-25 | End: 2025-04-29 | Stop reason: HOSPADM

## 2025-04-25 RX ORDER — POTASSIUM CHLORIDE 1500 MG/1
40 TABLET, EXTENDED RELEASE ORAL PRN
Status: DISCONTINUED | OUTPATIENT
Start: 2025-04-25 | End: 2025-04-29 | Stop reason: HOSPADM

## 2025-04-25 RX ORDER — ACETAMINOPHEN 325 MG/1
650 TABLET ORAL EVERY 6 HOURS PRN
Status: DISCONTINUED | OUTPATIENT
Start: 2025-04-25 | End: 2025-04-29 | Stop reason: HOSPADM

## 2025-04-25 RX ORDER — PANTOPRAZOLE SODIUM 40 MG/1
40 TABLET, DELAYED RELEASE ORAL 2 TIMES DAILY
Status: DISCONTINUED | OUTPATIENT
Start: 2025-04-25 | End: 2025-04-29 | Stop reason: HOSPADM

## 2025-04-25 RX ORDER — MIDODRINE HYDROCHLORIDE 2.5 MG/1
2.5 TABLET ORAL 3 TIMES DAILY PRN
Status: DISCONTINUED | OUTPATIENT
Start: 2025-04-25 | End: 2025-04-29 | Stop reason: HOSPADM

## 2025-04-25 RX ORDER — SODIUM CHLORIDE 0.9 % (FLUSH) 0.9 %
5-40 SYRINGE (ML) INJECTION PRN
Status: DISCONTINUED | OUTPATIENT
Start: 2025-04-25 | End: 2025-04-29 | Stop reason: HOSPADM

## 2025-04-25 RX ORDER — SODIUM CHLORIDE 9 MG/ML
INJECTION, SOLUTION INTRAVENOUS PRN
Status: DISCONTINUED | OUTPATIENT
Start: 2025-04-25 | End: 2025-04-29 | Stop reason: HOSPADM

## 2025-04-25 RX ORDER — ATORVASTATIN CALCIUM 80 MG/1
80 TABLET, FILM COATED ORAL DAILY
Status: DISCONTINUED | OUTPATIENT
Start: 2025-04-26 | End: 2025-04-29 | Stop reason: HOSPADM

## 2025-04-25 RX ORDER — SODIUM CHLORIDE 0.9 % (FLUSH) 0.9 %
5-40 SYRINGE (ML) INJECTION EVERY 12 HOURS SCHEDULED
Status: DISCONTINUED | OUTPATIENT
Start: 2025-04-25 | End: 2025-04-29 | Stop reason: HOSPADM

## 2025-04-25 RX ORDER — KETOROLAC TROMETHAMINE 30 MG/ML
15 INJECTION, SOLUTION INTRAMUSCULAR; INTRAVENOUS ONCE
Status: COMPLETED | OUTPATIENT
Start: 2025-04-25 | End: 2025-04-25

## 2025-04-25 RX ORDER — BUDESONIDE AND FORMOTEROL FUMARATE DIHYDRATE 160; 4.5 UG/1; UG/1
2 AEROSOL RESPIRATORY (INHALATION)
Status: DISCONTINUED | OUTPATIENT
Start: 2025-04-25 | End: 2025-04-29 | Stop reason: HOSPADM

## 2025-04-25 RX ORDER — POTASSIUM CHLORIDE 7.45 MG/ML
10 INJECTION INTRAVENOUS PRN
Status: DISCONTINUED | OUTPATIENT
Start: 2025-04-25 | End: 2025-04-29 | Stop reason: HOSPADM

## 2025-04-25 RX ADMIN — MELATONIN TAB 3 MG 3 MG: 3 TAB at 21:53

## 2025-04-25 RX ADMIN — PANTOPRAZOLE SODIUM 40 MG: 40 TABLET, DELAYED RELEASE ORAL at 21:53

## 2025-04-25 RX ADMIN — BUDESONIDE AND FORMOTEROL FUMARATE DIHYDRATE 2 PUFF: 160; 4.5 AEROSOL RESPIRATORY (INHALATION) at 19:12

## 2025-04-25 RX ADMIN — TOPIRAMATE 100 MG: 100 TABLET, FILM COATED ORAL at 21:54

## 2025-04-25 RX ADMIN — INSULIN GLARGINE 30 UNITS: 100 INJECTION, SOLUTION SUBCUTANEOUS at 21:53

## 2025-04-25 RX ADMIN — ENOXAPARIN SODIUM 40 MG: 100 INJECTION SUBCUTANEOUS at 19:11

## 2025-04-25 RX ADMIN — RANOLAZINE 500 MG: 500 TABLET, EXTENDED RELEASE ORAL at 21:53

## 2025-04-25 RX ADMIN — OXYCODONE HYDROCHLORIDE AND ACETAMINOPHEN 1 TABLET: 5; 325 TABLET ORAL at 21:53

## 2025-04-25 RX ADMIN — SODIUM CHLORIDE, PRESERVATIVE FREE 10 ML: 5 INJECTION INTRAVENOUS at 21:55

## 2025-04-25 RX ADMIN — KETOROLAC TROMETHAMINE 15 MG: 30 INJECTION, SOLUTION INTRAMUSCULAR at 14:17

## 2025-04-25 RX ADMIN — DOCUSATE SODIUM 100 MG: 100 CAPSULE, LIQUID FILLED ORAL at 21:54

## 2025-04-25 RX ADMIN — TICAGRELOR 90 MG: 90 TABLET ORAL at 21:53

## 2025-04-25 ASSESSMENT — PAIN - FUNCTIONAL ASSESSMENT: PAIN_FUNCTIONAL_ASSESSMENT: ACTIVITIES ARE NOT PREVENTED

## 2025-04-25 ASSESSMENT — HEART SCORE: ECG: NORMAL

## 2025-04-25 ASSESSMENT — PAIN DESCRIPTION - LOCATION
LOCATION: CHEST
LOCATION: ARM
LOCATION: CHEST;ARM

## 2025-04-25 ASSESSMENT — PAIN DESCRIPTION - DESCRIPTORS
DESCRIPTORS: ACHING
DESCRIPTORS: ACHING

## 2025-04-25 ASSESSMENT — PAIN SCALES - GENERAL
PAINLEVEL_OUTOF10: 8
PAINLEVEL_OUTOF10: 9
PAINLEVEL_OUTOF10: 7

## 2025-04-25 ASSESSMENT — PAIN DESCRIPTION - ORIENTATION: ORIENTATION: LEFT

## 2025-04-25 NOTE — ED PROVIDER NOTES
EMERGENCY DEPARTMENT ENCOUNTER    Pt Name: Mariangel Abreu  MRN: [id number]  Birthdate 1950  Date of evaluation: 4/25/25  CHIEF COMPLAINT       Chief Complaint   Patient presents with    Chest Pain     HISTORY OF PRESENT ILLNESS   Patient is presenting with chief complaint of chest pain that started this morning after she woke up.  She describes it as a heaviness in the middle of her chest without radiation.  She also endorses diaphoresis and shortness of breath.  She denies any nausea or vomiting.  Her pain has been constant since onset.  She has tried nitroglycerin without relief prompting her visit.  Risk factors for ACS include hypertension, hyperlipidemia, diabetes, obesity, history of CAD.  Patient had stents placed last year after 2 PCI's.    The history is provided by the patient.           REVIEW OF SYSTEMS     Review of Systems   Constitutional:  Positive for diaphoresis. Negative for chills and fever.   Eyes:  Negative for visual disturbance.   Respiratory:  Positive for cough and shortness of breath.    Cardiovascular:  Positive for chest pain.   Gastrointestinal:  Negative for abdominal pain, nausea and vomiting.   Musculoskeletal:  Negative for back pain and neck pain.   Neurological:  Negative for headaches.     PASTMEDICAL HISTORY     Past Medical History:   Diagnosis Date    Abdominal bloating 01/26/2021    Adenomatous polyp of ascending colon 01/26/2021    Allergic rhinitis     Anxiety 07/17/2013    Arthritis     Asthma     Atrial fibrillation (HCC)     Back pain     NERVE/DR. GRACIA    Benign hypertension with CKD (chronic kidney disease) stage III ([facility])     CAD (coronary artery disease) 03/21/2013    Caffeine use     2 coffee/day    CHF (congestive heart failure) (HCC)     Chronic kidney disease     CKD (chronic kidney disease) stage 3, GFR 30-59 ml/min ([facility])     Claudication 6/2/2024    COPD (chronic obstructive pulmonary disease) ([facility])     emphysema    Degeneration of lumbar or  stable.      Imaging that is independently reviewed and interpreted by me as: Chest x-ray shows no acute intrathoracic process.    HEARTSCORE  History: 1  EC  Patient Age: 1  *Risk factors for Atherosclerotic disease: Hypercholesterolemia; Hypertension; Diabetes Mellitus; Obesity  Risk Factors: 2  Troponin: 0  Heart Score Total: 5          3)  Treatment and Disposition           I spoke with Dr. Praveen Pereira cardiology regarding the patient's workup and her upcoming PCI.  I applied shared decision making with the patient after speaking with cardiology.  She was offered to follow-up with her cardiologist and use nitroglycerin as needed given her negative workup and her close follow-up.  Patient decided that she would rather be admitted for observation because of her chest pain.  I spoke to Dr. Hoyos who accepts admission.    CRITICAL CARE:       PROCEDURES:  I placed an ultrasound-guided 20-gauge IV in the left forearm.    Procedures      DATA FOR LAB AND RADIOLOGY TESTS ORDERED BELOW ARE REVIEWED BY THE ED CLINICIAN:    RADIOLOGY: All x-rays, CT, MRI, and formal ultrasound images (except ED bedside ultrasound) are read by the radiologist, see reports below, unless otherwise noted in MDM or here.  Reports below are reviewed by myself.  XR CHEST PORTABLE   Final Result   Stable chest without consolidation or edema..             LABS: Lab orders shown below, the results are reviewed by myself, and all abnormals are listed below.  Labs Reviewed   CBC WITH AUTO DIFFERENTIAL - Abnormal; Notable for the following components:       Result Value    RDW 16.0 (*)     Lymphocytes % 20 (*)     Monocytes % 9 (*)     All other components within normal limits   COMPREHENSIVE METABOLIC PANEL - Abnormal; Notable for the following components:    Sodium 135 (*)     Glucose 127 (*)     Est, Glom Filt Rate 59 (*)     All other components within normal limits   MAGNESIUM   TROPONIN   TROPONIN       Vitals Reviewed:    Vitals:

## 2025-04-26 LAB
GLUCOSE BLD-MCNC: 199 MG/DL (ref 65–105)
GLUCOSE BLD-MCNC: 208 MG/DL (ref 65–105)
GLUCOSE BLD-MCNC: 245 MG/DL (ref 65–105)
GLUCOSE BLD-MCNC: 86 MG/DL (ref 65–105)

## 2025-04-26 PROCEDURE — G0378 HOSPITAL OBSERVATION PER HR: HCPCS

## 2025-04-26 PROCEDURE — 6370000000 HC RX 637 (ALT 250 FOR IP)

## 2025-04-26 PROCEDURE — 94640 AIRWAY INHALATION TREATMENT: CPT

## 2025-04-26 PROCEDURE — 1200000000 HC SEMI PRIVATE

## 2025-04-26 PROCEDURE — 94761 N-INVAS EAR/PLS OXIMETRY MLT: CPT

## 2025-04-26 PROCEDURE — 6360000002 HC RX W HCPCS

## 2025-04-26 PROCEDURE — 82947 ASSAY GLUCOSE BLOOD QUANT: CPT

## 2025-04-26 PROCEDURE — 6370000000 HC RX 637 (ALT 250 FOR IP): Performed by: NURSE PRACTITIONER

## 2025-04-26 PROCEDURE — 2500000003 HC RX 250 WO HCPCS

## 2025-04-26 PROCEDURE — 94660 CPAP INITIATION&MGMT: CPT

## 2025-04-26 PROCEDURE — 2700000000 HC OXYGEN THERAPY PER DAY

## 2025-04-26 PROCEDURE — 99233 SBSQ HOSP IP/OBS HIGH 50: CPT | Performed by: INTERNAL MEDICINE

## 2025-04-26 RX ADMIN — ACETAMINOPHEN 650 MG: 325 TABLET ORAL at 20:24

## 2025-04-26 RX ADMIN — TICAGRELOR 90 MG: 90 TABLET ORAL at 10:02

## 2025-04-26 RX ADMIN — ATORVASTATIN CALCIUM 80 MG: 80 TABLET, FILM COATED ORAL at 09:50

## 2025-04-26 RX ADMIN — TOPIRAMATE 100 MG: 100 TABLET, FILM COATED ORAL at 20:21

## 2025-04-26 RX ADMIN — SODIUM CHLORIDE, PRESERVATIVE FREE 10 ML: 5 INJECTION INTRAVENOUS at 20:25

## 2025-04-26 RX ADMIN — TICAGRELOR 90 MG: 90 TABLET ORAL at 20:21

## 2025-04-26 RX ADMIN — ACETAMINOPHEN 650 MG: 325 TABLET ORAL at 10:02

## 2025-04-26 RX ADMIN — BUDESONIDE AND FORMOTEROL FUMARATE DIHYDRATE 2 PUFF: 160; 4.5 AEROSOL RESPIRATORY (INHALATION) at 07:35

## 2025-04-26 RX ADMIN — ENOXAPARIN SODIUM 40 MG: 100 INJECTION SUBCUTANEOUS at 09:50

## 2025-04-26 RX ADMIN — RANOLAZINE 500 MG: 500 TABLET, EXTENDED RELEASE ORAL at 20:21

## 2025-04-26 RX ADMIN — PANTOPRAZOLE SODIUM 40 MG: 40 TABLET, DELAYED RELEASE ORAL at 09:50

## 2025-04-26 RX ADMIN — MELATONIN TAB 3 MG 3 MG: 3 TAB at 20:22

## 2025-04-26 RX ADMIN — RANOLAZINE 500 MG: 500 TABLET, EXTENDED RELEASE ORAL at 10:02

## 2025-04-26 RX ADMIN — DOCUSATE SODIUM 100 MG: 100 CAPSULE, LIQUID FILLED ORAL at 20:21

## 2025-04-26 RX ADMIN — DOCUSATE SODIUM 100 MG: 100 CAPSULE, LIQUID FILLED ORAL at 09:50

## 2025-04-26 RX ADMIN — INSULIN GLARGINE 30 UNITS: 100 INJECTION, SOLUTION SUBCUTANEOUS at 09:51

## 2025-04-26 RX ADMIN — PANTOPRAZOLE SODIUM 40 MG: 40 TABLET, DELAYED RELEASE ORAL at 20:21

## 2025-04-26 RX ADMIN — INSULIN GLARGINE 30 UNITS: 100 INJECTION, SOLUTION SUBCUTANEOUS at 20:21

## 2025-04-26 RX ADMIN — SODIUM CHLORIDE, PRESERVATIVE FREE 10 ML: 5 INJECTION INTRAVENOUS at 09:52

## 2025-04-26 RX ADMIN — ASPIRIN 81 MG: 81 TABLET, COATED ORAL at 09:50

## 2025-04-26 RX ADMIN — BUDESONIDE AND FORMOTEROL FUMARATE DIHYDRATE 2 PUFF: 160; 4.5 AEROSOL RESPIRATORY (INHALATION) at 19:35

## 2025-04-26 ASSESSMENT — PAIN DESCRIPTION - DESCRIPTORS
DESCRIPTORS: ACHING
DESCRIPTORS: ACHING

## 2025-04-26 ASSESSMENT — PAIN DESCRIPTION - LOCATION
LOCATION: HEAD
LOCATION: HEAD

## 2025-04-26 ASSESSMENT — PAIN SCALES - GENERAL
PAINLEVEL_OUTOF10: 9
PAINLEVEL_OUTOF10: 0
PAINLEVEL_OUTOF10: 2

## 2025-04-26 ASSESSMENT — PAIN DESCRIPTION - ORIENTATION: ORIENTATION: ANTERIOR

## 2025-04-26 NOTE — PROGRESS NOTES
Patient c/o pain 9/10 and asked if she could have pain medication. Tylenol was only medication ordered and patient requested that oxycodone be ordered as she takes that at home. Patient then asked if she would be given melatonin with her night time medications as she usually takes that at home as well. Perfect Serve sent to provider  to see if those could be ordered.    ORLANDO Clark read the message and new orders placed.

## 2025-04-26 NOTE — ACP (ADVANCE CARE PLANNING)
Advance Care Planning     Advance Care Planning Activator (Inpatient)  Conversation Note      Date of ACP Conversation: 4/26/2025     Conversation Conducted with: Patient with Decision Making Capacity    ACP Activator: Karolyn Porter RN    Health Care Decision Maker:     Current Designated Health Care Decision Maker:     Primary Decision Maker (Active): David Waller - Other Relative - [phone]  Click here to complete Healthcare Decision Makers including section of the Healthcare Decision Maker Relationship (ie \"Primary\")  Today we documented Decision Maker(s) consistent with Legal Next of Kin hierarchy.    Care Preferences    Ventilation:  \"If you were in your present state of health and suddenly became very ill and were unable to breathe on your own, what would your preference be about the use of a ventilator (breathing machine) if it were available to you?\"      Would the patient desire the use of ventilator (breathing machine)?: yes    \"If your health worsens and it becomes clear that your chance of recovery is unlikely, what would your preference be about the use of a ventilator (breathing machine) if it were available to you?\"     Would the patient desire the use of ventilator (breathing machine)?: No      Resuscitation  \"CPR works best to restart the heart when there is a sudden event, like a heart attack, in someone who is otherwise healthy. Unfortunately, CPR does not typically restart the heart for people who have serious health conditions or who are very sick.\"    \"In the event your heart stopped as a result of an underlying serious health condition, would you want attempts to be made to restart your heart (answer \"yes\" for attempt to resuscitate) or would you prefer a natural death (answer \"no\" for do not attempt to resuscitate)?\" yes       [] Yes   [x] No   Educated Patient / Decision Maker regarding differences between Advance Directives and portable DNR orders.    Length of ACP Conversation in  minutes:      Conversation Outcomes:  ACP discussion completed    Follow-up plan:    [] Schedule follow-up conversation to continue planning  [x] Referred individual to Provider for additional questions/concerns   [] Advised patient/agent/surrogate to review completed ACP document and update if needed with changes in condition, patient preferences or care setting    [] This note routed to one or more involved healthcare providers

## 2025-04-26 NOTE — CARE COORDINATION
Case Management Assessment  Initial Evaluation    Date/Time of Evaluation: 4/26/2025 9:05 AM  Assessment Completed by: Karolyn Porter RN    If patient is discharged prior to next notation, then this note serves as note for discharge by case management.    Patient Name: Mariangel Abreu                   YOB: 1950  Diagnosis: Chest pain [R07.9]  Chest pain, unspecified type [R07.9]                   Date / Time: 4/25/2025 12:17 PM    Patient Admission Status: Observation   Readmission Risk (Low < 19, Mod (19-27), High > 27): Readmission Risk Score: 25.7    Current PCP: Nuzhat Ramos DTR  PCP verified by CM? Yes ([facility])    Chart Reviewed: Yes      History Provided by: Patient  Patient Orientation: Alert and Oriented    Patient Cognition: Alert    Hospitalization in the last 30 days (Readmission):  Yes    If yes, Readmission Assessment in CM Navigator will be completed.    Advance Directives:      Code Status: Full Code   Patient's Primary Decision Maker is: Legal Next of Kin    Primary Decision Maker (Active): David Waller - Other Relative - [phone]    Discharge Planning:    Patient lives with: Alone Type of Home: Apartment  Primary Care Giver: Self  Patient Support Systems include: Family Members   Current Financial resources: Medicare, Medicaid  Current community resources: ECF/Home Care  Current services prior to admission: C-pap, Home Care, Durable Medical Equipment, Oxygen Therapy            Current DME: Oxygen Therapy (Comment), Cane, Shower Chair, Cpap, Glucometer, Home Aerosol, Other (Comment), Walker (Electric scooter)            Type of Home Care services:  OT, PT, Nursing Services    ADLS  Prior functional level: Independent in ADLs/IADLs  Current functional level: Independent in ADLs/IADLs    PT AM-PAC:   /24  OT AM-PAC:   /24    Family can provide assistance at DC: Yes  Would you like Case Management to discuss the discharge plan with any other family

## 2025-04-26 NOTE — PLAN OF CARE
Problem: Chronic Conditions and Co-morbidities  Goal: Patient's chronic conditions and co-morbidity symptoms are monitored and maintained or improved  Outcome: Progressing     Problem: Discharge Planning  Goal: Discharge to home or other facility with appropriate resources  Outcome: Progressing     Problem: Pain  Goal: Verbalizes/displays adequate comfort level or baseline comfort level  Outcome: Progressing     Problem: Skin/Tissue Integrity  Goal: Skin integrity remains intact  Description: 1.  Monitor for areas of redness and/or skin breakdown2.  Assess vascular access sites hourly3.  Every 4-6 hours minimum:  Change oxygen saturation probe site4.  Every 4-6 hours:  If on nasal continuous positive airway pressure, respiratory therapy assess nares and determine need for appliance change or resting period  Outcome: Progressing  Flowsheets (Taken 4/25/2025 1816)  Skin Integrity Remains Intact:   Monitor for areas of redness and/or skin breakdown   Assess vascular access sites hourly     Problem: Safety - Adult  Goal: Free from fall injury  Outcome: Progressing  Flowsheets (Taken 4/25/2025 1816)  Free From Fall Injury: Instruct family/caregiver on patient safety

## 2025-04-26 NOTE — PLAN OF CARE
Problem: Respiratory - Adult  Goal: Achieves optimal ventilation and oxygenation  Outcome: Progressing  Flowsheets (Taken 4/26/2025 4484 by Karolyn Gtaica RN)  Achieves optimal ventilation and oxygenation:   Assess for changes in respiratory status   Assess for changes in mentation and behavior   Position to facilitate oxygenation and minimize respiratory effort   Oxygen supplementation based on oxygen saturation or arterial blood gases   Encourage broncho-pulmonary hygiene including cough, deep breathe, incentive spirometry   Assess and instruct to report shortness of breath or any respiratory difficulty

## 2025-04-26 NOTE — H&P
[facility] Internal Medicine   Mejia Kelly MD; MD Ivana Arnold MD, MD , Sammy Hoyos MD    [facility] Internal Medicine   IN-PATIENT SERVICE   [facility]    HISTORY AND PHYSICAL EXAMINATION            Date:   4/25/2025  Patient name:  Mariangel Abreu  Date of admission:  4/25/2025 12:17 PM  MRN:   [id number]  Account:  [id number]  YOB: 1950  PCP:    Nuzhat Ramos DTR  Room:   2044/2044-01  Code Status:    Full Code    Chief Complaint:     Chief Complaint   Patient presents with    Chest Pain       History Obtained From:     patient, electronic medical record    History of Present Illness:     Mariangel Abreu is a 74 y.o. Non- / non  female who presents with Chest Pain   and is admitted to the hospital for the management of Chest pain.    74-year-old female with past medical history as mentioned below presented to [facility] emergency department for chest pain, which she describes as dull and because of her past medical history of stents prompted her to come here.  Denies any shortness of breath but complaining of dizziness when she is not in to the bathroom and came back at home.  Admitted for management of chest pain.    Past Medical History:     Past Medical History:   Diagnosis Date    Abdominal bloating 01/26/2021    Adenomatous polyp of ascending colon 01/26/2021    Allergic rhinitis     Anxiety 07/17/2013    Arthritis     Asthma     Atrial fibrillation (HCC)     Back pain     JAIME/DR. GRACIA    Benign hypertension with CKD (chronic kidney disease) stage III ([facility])     CAD (coronary artery disease) 03/21/2013    Caffeine use     2 coffee/day    CHF (congestive heart failure) ([facility])     Chronic kidney disease     CKD (chronic kidney disease) stage 3, GFR 30-59 ml/min ([facility])     Claudication 6/2/2024    COPD (chronic obstructive pulmonary disease) ([facility])     emphysema    Degeneration of  hours)   CBC with Auto Differential    Collection Time: 04/25/25 12:46 PM   Result Value Ref Range    WBC 7.8 3.5 - 11.0 k/uL    RBC 4.68 4.0 - 5.2 m/uL    Hemoglobin 13.8 12.0 - 16.0 g/dL    Hematocrit 41.5 36 - 46 %    MCV 88.8 80 - 100 fL    MCH 29.6 26 - 34 pg    MCHC 33.3 31 - 37 g/dL    RDW 16.0 (H) 11.5 - 14.9 %    Platelets 232 150 - 450 k/uL    MPV 9.4 6.0 - 12.0 fL    Neutrophils % 66 36 - 66 %    Lymphocytes % 20 (L) 24 - 44 %    Monocytes % 9 (H) 1 - 7 %    Eosinophils % 3 0 - 4 %    Basophils % 2 0 - 2 %    Neutrophils Absolute 5.20 1.3 - 9.1 k/uL    Lymphocytes Absolute 1.60 1.0 - 4.8 k/uL    Monocytes Absolute 0.70 0.1 - 1.3 k/uL    Eosinophils Absolute 0.20 0.0 - 0.4 k/uL    Basophils Absolute 0.10 0.0 - 0.2 k/uL   Comprehensive Metabolic Panel    Collection Time: 04/25/25 12:46 PM   Result Value Ref Range    Sodium 135 (L) 136 - 145 mmol/L    Potassium 4.1 3.7 - 5.3 mmol/L    Chloride 99 98 - 107 mmol/L    CO2 25 20 - 31 mmol/L    Anion Gap 11 9 - 16 mmol/L    Glucose 127 (H) 74 - 99 mg/dL    BUN 17 8 - 23 mg/dL    Creatinine 1.0 0.7 - 1.2 mg/dL    Est, Glom Filt Rate 59 (L) >60 mL/min/1.73m2    Calcium 9.1 8.6 - 10.4 mg/dL    Total Protein 7.1 6.6 - 8.7 g/dL    Albumin 3.8 3.5 - 5.2 g/dL    Total Bilirubin 0.3 0.0 - 1.2 mg/dL    Alkaline Phosphatase 71 35 - 104 U/L    ALT 13 10 - 35 U/L    AST 21 10 - 35 U/L   Magnesium    Collection Time: 04/25/25 12:46 PM   Result Value Ref Range    Magnesium 1.6 1.6 - 2.4 mg/dL   Troponin Now and Q 90 Min    Collection Time: 04/25/25 12:46 PM   Result Value Ref Range    Troponin, High Sensitivity 12 0 - 14 ng/L   Troponin Now and Q 90 Min    Collection Time: 04/25/25  3:36 PM   Result Value Ref Range    Troponin, High Sensitivity 13 0 - 14 ng/L   POC Glucose Fingerstick    Collection Time: 04/25/25  7:54 PM   Result Value Ref Range    POC Glucose 134 (H) 65 - 105 mg/dL       Imaging/Diagnostics:  XR CHEST PORTABLE  Result Date: 4/25/2025  Stable chest without

## 2025-04-26 NOTE — PROGRESS NOTES
[facility] Internal Medicine   Mejia Kelly MD; MD Ivana Arnold MD, MD , Sammy Hoyos MD    [facility] Internal Medicine   IN-PATIENT SERVICE   [facility]    HISTORY AND PHYSICAL EXAMINATION            Date:   4/26/2025  Patient name:  Mariangel Abreu  Date of admission:  4/25/2025 12:17 PM  MRN:   [id number]  Account:  [id number]  YOB: 1950  PCP:    Nuzhat Ramos DTR  Room:   2044/2044-01  Code Status:    Full Code    Chief Complaint:     Chief Complaint   Patient presents with    Chest Pain       History Obtained From:     patient, electronic medical record    History of Present Illness:     Mariangel Abreu is a 74 y.o. Non- / non  female who presents with Chest Pain   and is admitted to the hospital for the management of Chest pain.    74-year-old female with past medical history as mentioned below presented to [facility] emergency department for chest pain, which she describes as dull and because of her past medical history of stents prompted her to come here.  Denies any shortness of breath but complaining of dizziness when she is not in to the bathroom and came back at home.  Admitted for management of chest pain.  4/26   Patient, has multiple medical problem which has morbid obesity, HIGINIO on CPAP, COPD, and artery disease, status post recent stent, patient had cardiac angiogram done in March of this year drug-eluting stent was placed, patient told me that she will have second stage PCI on 19 May  Claimed that she is compliant with her diet medication  Chest pain is improved from yesterday, but still bothering her  On Ranexa   Past Medical History:     Past Medical History:   Diagnosis Date    Abdominal bloating 01/26/2021    Adenomatous polyp of ascending colon 01/26/2021    Allergic rhinitis     Anxiety 07/17/2013    Arthritis     Asthma     Atrial fibrillation (HCC)     Back pain

## 2025-04-26 NOTE — PROGRESS NOTES
Comprehensive Nutrition Assessment    Type and Reason for Visit:  Positive nutrition screen (wt loss, poor appetite)    Nutrition Recommendations/Plan:   Will continue to provide 4 carbohydrate choices per tray  Will discontinue HS supplement as long as intake remains more than 75%     Malnutrition Assessment:  Malnutrition Status:  At risk for malnutrition (04/26/25 1437)    Context:  Chronic Illness     Findings of the 6 clinical characteristics of malnutrition:  Energy Intake:  No decrease in energy intake  Weight Loss:   (6.5% wt loss over 10 months)     Body Fat Loss:  No body fat loss     Muscle Mass Loss:  No muscle mass loss    Fluid Accumulation:  Mild Extremities   Strength:  Not Performed    Nutrition Assessment:    Pt was admitted due to chest pain. Observed lunch tray consumed 100% suggesting no problems with appetite. Documentation of recent admit suggests pt ate all food provided at that time as well. Review of wt history shows 15# wt loss over the past 10 months probably due to fluid loss    Nutrition Related Findings:    mild BLE edema, Labs: POC Glu , Meds: Reviewed, PMH: CKD, CHF, COPD, DM, Hiatal Hernia, IBS, CVA Wound Type: None       Current Nutrition Intake & Therapies:    Average Meal Intake: %     ADULT ORAL NUTRITION SUPPLEMENT; PM Snack; Diabetic Oral Supplement  ADULT DIET; Regular; 4 carb choices (60 gm/meal)    Anthropometric Measures:  Height: 157.5 cm (5' 2\")  Ideal Body Weight (IBW): 110 lbs (50 kg)    Admission Body Weight: 95 kg (209 lb 7 oz)  Current Body Weight: 95 kg (209 lb 7 oz), 190.4 % IBW. Weight Source: Bed scale  Current BMI (kg/m2): 38.3  Usual Body Weight: 101.6 kg (224 lb) (6/24)     % Weight Change (Calculated): -6.5                    BMI Categories: Obese Class 2 (BMI 35.0 -39.9)    Estimated Daily Nutrient Needs:  Energy Requirements Based On: Formula  Weight Used for Energy Requirements: Current  Energy (kcal/day): [place] x 1-1.2= [id number]

## 2025-04-27 LAB
ANTI-XA UNFRAC HEPARIN: 0.3 IU/L (ref 0.3–0.7)
ANTI-XA UNFRAC HEPARIN: 0.52 IU/L (ref 0.3–0.7)
ANTI-XA UNFRAC HEPARIN: 0.66 IU/L (ref 0.3–0.7)
ERYTHROCYTE [DISTWIDTH] IN BLOOD BY AUTOMATED COUNT: 15.9 % (ref 11.5–14.9)
GLUCOSE BLD-MCNC: 165 MG/DL (ref 65–105)
GLUCOSE BLD-MCNC: 219 MG/DL (ref 65–105)
GLUCOSE BLD-MCNC: 247 MG/DL (ref 65–105)
GLUCOSE BLD-MCNC: 292 MG/DL (ref 65–105)
HCT VFR BLD AUTO: 40.4 % (ref 36–46)
HGB BLD-MCNC: 13.1 G/DL (ref 12–16)
INR PPP: 1
MCH RBC QN AUTO: 29.4 PG (ref 26–34)
MCHC RBC AUTO-ENTMCNC: 32.5 G/DL (ref 31–37)
MCV RBC AUTO: 90.6 FL (ref 80–100)
PARTIAL THROMBOPLASTIN TIME: 33.5 SEC (ref 24–36)
PLATELET # BLD AUTO: 192 K/UL (ref 150–450)
PMV BLD AUTO: 8.8 FL (ref 6–12)
PROTHROMBIN TIME: 13.5 SEC (ref 11.8–14.6)
RBC # BLD AUTO: 4.46 M/UL (ref 4–5.2)
TROPONIN I SERPL HS-MCNC: 22 NG/L (ref 0–14)
WBC OTHER # BLD: 5.5 K/UL (ref 3.5–11)

## 2025-04-27 PROCEDURE — 99232 SBSQ HOSP IP/OBS MODERATE 35: CPT | Performed by: INTERNAL MEDICINE

## 2025-04-27 PROCEDURE — 94761 N-INVAS EAR/PLS OXIMETRY MLT: CPT

## 2025-04-27 PROCEDURE — 85520 HEPARIN ASSAY: CPT

## 2025-04-27 PROCEDURE — 94640 AIRWAY INHALATION TREATMENT: CPT

## 2025-04-27 PROCEDURE — 6360000002 HC RX W HCPCS: Performed by: INTERNAL MEDICINE

## 2025-04-27 PROCEDURE — 2500000003 HC RX 250 WO HCPCS

## 2025-04-27 PROCEDURE — 97162 PT EVAL MOD COMPLEX 30 MIN: CPT

## 2025-04-27 PROCEDURE — G0378 HOSPITAL OBSERVATION PER HR: HCPCS

## 2025-04-27 PROCEDURE — 97530 THERAPEUTIC ACTIVITIES: CPT

## 2025-04-27 PROCEDURE — 85027 COMPLETE CBC AUTOMATED: CPT

## 2025-04-27 PROCEDURE — 6370000000 HC RX 637 (ALT 250 FOR IP)

## 2025-04-27 PROCEDURE — 85730 THROMBOPLASTIN TIME PARTIAL: CPT

## 2025-04-27 PROCEDURE — 84484 ASSAY OF TROPONIN QUANT: CPT

## 2025-04-27 PROCEDURE — 94660 CPAP INITIATION&MGMT: CPT

## 2025-04-27 PROCEDURE — 85610 PROTHROMBIN TIME: CPT

## 2025-04-27 PROCEDURE — 6370000000 HC RX 637 (ALT 250 FOR IP): Performed by: NURSE PRACTITIONER

## 2025-04-27 PROCEDURE — 1200000000 HC SEMI PRIVATE

## 2025-04-27 PROCEDURE — 97535 SELF CARE MNGMENT TRAINING: CPT

## 2025-04-27 PROCEDURE — 6360000002 HC RX W HCPCS

## 2025-04-27 PROCEDURE — 2700000000 HC OXYGEN THERAPY PER DAY

## 2025-04-27 PROCEDURE — 82947 ASSAY GLUCOSE BLOOD QUANT: CPT

## 2025-04-27 PROCEDURE — 97166 OT EVAL MOD COMPLEX 45 MIN: CPT

## 2025-04-27 PROCEDURE — 36415 COLL VENOUS BLD VENIPUNCTURE: CPT

## 2025-04-27 RX ORDER — SODIUM CHLORIDE AND POTASSIUM CHLORIDE 150; 900 MG/100ML; MG/100ML
INJECTION, SOLUTION INTRAVENOUS CONTINUOUS
Status: DISCONTINUED | OUTPATIENT
Start: 2025-04-27 | End: 2025-04-27

## 2025-04-27 RX ORDER — HEPARIN SODIUM 1000 [USP'U]/ML
4000 INJECTION, SOLUTION INTRAVENOUS; SUBCUTANEOUS PRN
Status: DISCONTINUED | OUTPATIENT
Start: 2025-04-27 | End: 2025-04-29 | Stop reason: HOSPADM

## 2025-04-27 RX ORDER — HEPARIN SODIUM 1000 [USP'U]/ML
4000 INJECTION, SOLUTION INTRAVENOUS; SUBCUTANEOUS PRN
Status: DISCONTINUED | OUTPATIENT
Start: 2025-04-27 | End: 2025-04-27

## 2025-04-27 RX ORDER — HEPARIN SODIUM 10000 [USP'U]/100ML
5-30 INJECTION, SOLUTION INTRAVENOUS CONTINUOUS
Status: DISCONTINUED | OUTPATIENT
Start: 2025-04-27 | End: 2025-04-27 | Stop reason: CLARIF

## 2025-04-27 RX ORDER — HEPARIN SODIUM 1000 [USP'U]/ML
2000 INJECTION, SOLUTION INTRAVENOUS; SUBCUTANEOUS PRN
Status: DISCONTINUED | OUTPATIENT
Start: 2025-04-27 | End: 2025-04-29 | Stop reason: HOSPADM

## 2025-04-27 RX ORDER — HEPARIN SODIUM 10000 [USP'U]/100ML
5-30 INJECTION, SOLUTION INTRAVENOUS CONTINUOUS
Status: DISCONTINUED | OUTPATIENT
Start: 2025-04-27 | End: 2025-04-27

## 2025-04-27 RX ORDER — SODIUM CHLORIDE AND POTASSIUM CHLORIDE 150; 900 MG/100ML; MG/100ML
INJECTION, SOLUTION INTRAVENOUS CONTINUOUS
Status: DISCONTINUED | OUTPATIENT
Start: 2025-04-27 | End: 2025-04-29 | Stop reason: HOSPADM

## 2025-04-27 RX ORDER — HEPARIN SODIUM 10000 [USP'U]/100ML
5-30 INJECTION, SOLUTION INTRAVENOUS CONTINUOUS
Status: DISPENSED | OUTPATIENT
Start: 2025-04-27 | End: 2025-04-28

## 2025-04-27 RX ORDER — HEPARIN SODIUM 1000 [USP'U]/ML
4000 INJECTION, SOLUTION INTRAVENOUS; SUBCUTANEOUS ONCE
Status: DISCONTINUED | OUTPATIENT
Start: 2025-04-27 | End: 2025-04-27

## 2025-04-27 RX ORDER — HEPARIN SODIUM 1000 [USP'U]/ML
2000 INJECTION, SOLUTION INTRAVENOUS; SUBCUTANEOUS PRN
Status: DISCONTINUED | OUTPATIENT
Start: 2025-04-27 | End: 2025-04-27

## 2025-04-27 RX ORDER — HEPARIN SODIUM 1000 [USP'U]/ML
4000 INJECTION, SOLUTION INTRAVENOUS; SUBCUTANEOUS ONCE
Status: COMPLETED | OUTPATIENT
Start: 2025-04-27 | End: 2025-04-27

## 2025-04-27 RX ADMIN — ACETAMINOPHEN 650 MG: 325 TABLET ORAL at 18:16

## 2025-04-27 RX ADMIN — DOCUSATE SODIUM 100 MG: 100 CAPSULE, LIQUID FILLED ORAL at 20:37

## 2025-04-27 RX ADMIN — RANOLAZINE 500 MG: 500 TABLET, EXTENDED RELEASE ORAL at 07:52

## 2025-04-27 RX ADMIN — RANOLAZINE 500 MG: 500 TABLET, EXTENDED RELEASE ORAL at 20:37

## 2025-04-27 RX ADMIN — INSULIN GLARGINE 30 UNITS: 100 INJECTION, SOLUTION SUBCUTANEOUS at 07:54

## 2025-04-27 RX ADMIN — MELATONIN TAB 3 MG 3 MG: 3 TAB at 20:37

## 2025-04-27 RX ADMIN — HEPARIN SODIUM 10 UNITS/KG/HR: 10000 INJECTION, SOLUTION INTRAVENOUS at 12:57

## 2025-04-27 RX ADMIN — PANTOPRAZOLE SODIUM 40 MG: 40 TABLET, DELAYED RELEASE ORAL at 20:37

## 2025-04-27 RX ADMIN — ASPIRIN 81 MG: 81 TABLET, COATED ORAL at 07:53

## 2025-04-27 RX ADMIN — TICAGRELOR 90 MG: 90 TABLET ORAL at 07:52

## 2025-04-27 RX ADMIN — HEPARIN SODIUM 4000 UNITS: 1000 INJECTION INTRAVENOUS; SUBCUTANEOUS at 12:57

## 2025-04-27 RX ADMIN — INSULIN GLARGINE 30 UNITS: 100 INJECTION, SOLUTION SUBCUTANEOUS at 20:38

## 2025-04-27 RX ADMIN — BUDESONIDE AND FORMOTEROL FUMARATE DIHYDRATE 2 PUFF: 160; 4.5 AEROSOL RESPIRATORY (INHALATION) at 18:59

## 2025-04-27 RX ADMIN — TICAGRELOR 90 MG: 90 TABLET ORAL at 20:37

## 2025-04-27 RX ADMIN — TOPIRAMATE 100 MG: 100 TABLET, FILM COATED ORAL at 20:37

## 2025-04-27 RX ADMIN — BUDESONIDE AND FORMOTEROL FUMARATE DIHYDRATE 2 PUFF: 160; 4.5 AEROSOL RESPIRATORY (INHALATION) at 08:00

## 2025-04-27 RX ADMIN — POTASSIUM CHLORIDE AND SODIUM CHLORIDE: 900; 150 INJECTION, SOLUTION INTRAVENOUS at 18:28

## 2025-04-27 RX ADMIN — PANTOPRAZOLE SODIUM 40 MG: 40 TABLET, DELAYED RELEASE ORAL at 07:52

## 2025-04-27 RX ADMIN — SODIUM CHLORIDE AND POTASSIUM CHLORIDE: 9; 1.49 INJECTION, SOLUTION INTRAVENOUS at 20:39

## 2025-04-27 RX ADMIN — ATORVASTATIN CALCIUM 80 MG: 80 TABLET, FILM COATED ORAL at 07:52

## 2025-04-27 RX ADMIN — DOCUSATE SODIUM 100 MG: 100 CAPSULE, LIQUID FILLED ORAL at 07:52

## 2025-04-27 RX ADMIN — ENOXAPARIN SODIUM 40 MG: 100 INJECTION SUBCUTANEOUS at 07:52

## 2025-04-27 RX ADMIN — SODIUM CHLORIDE, PRESERVATIVE FREE 10 ML: 5 INJECTION INTRAVENOUS at 07:56

## 2025-04-27 RX ADMIN — ALBUTEROL SULFATE 2 PUFF: 90 AEROSOL, METERED RESPIRATORY (INHALATION) at 08:01

## 2025-04-27 ASSESSMENT — PAIN DESCRIPTION - DESCRIPTORS: DESCRIPTORS: ACHING

## 2025-04-27 ASSESSMENT — PAIN SCALES - GENERAL
PAINLEVEL_OUTOF10: 6
PAINLEVEL_OUTOF10: 2

## 2025-04-27 ASSESSMENT — PAIN DESCRIPTION - LOCATION: LOCATION: HEAD

## 2025-04-27 NOTE — PROGRESS NOTES
[facility]   Occupational Therapy Evaluation  Date: 25  Patient Name: Mariangel Abreu       Room: -  MRN: [id number]  Account: [id number]   : 1950  (74 y.o.) Gender: female     Discharge Recommendations:  Further Occupational Therapy is recommended upon facility discharge.    OT Equipment Recommendations  Other: TBD    Referring Practitioner: Jhony Hoyos MD  Diagnosis: Chest pain        Treatment Diagnosis: impaired selfcare status    Past Medical History:  has a past medical history of Abdominal bloating, Adenomatous polyp of ascending colon, Allergic rhinitis, Anxiety, Arthritis, Asthma, Atrial fibrillation ([facility]), Back pain, Benign hypertension with CKD (chronic kidney disease) stage III ([facility]), CAD (coronary artery disease), Caffeine use, CHF (congestive heart failure) ([facility]), Chronic kidney disease, CKD (chronic kidney disease) stage 3, GFR 30-59 ml/min ([facility]), Claudication, COPD (chronic obstructive pulmonary disease) ([facility]), Degeneration of lumbar or lumbosacral intervertebral disc, Depression, DM (diabetes mellitus) ([facility]), Emphysema of lung ([facility]), Gastritis, GERD (gastroesophageal reflux disease), Hearing loss, Hematuria, Hiatal hernia, History of loop recorder, HTN (hypertension), benign, Hypertriglyceridemia, Incontinence of urine, Irritable bowel syndrome with both constipation and diarrhea, Kidney stone, Knee arthropathy, Long term (current) use of anticoagulants, Lumbosacral spondylosis without myelopathy, Migraine headache, Mumps, Neuropathy, Obesity, On home oxygen therapy, HIGINIO on CPAP, Otitis media, Secondary diabetes mellitus with stage 3 chronic kidney disease (GFR 30-59) ([facility]), STEMI (ST elevation myocardial infarction) ([facility]), Stroke ([facility]), TIA (transient ischemic attack), Tinnitus, Type 2 diabetes mellitus without complication ([facility]), Type II or unspecified type diabetes mellitus without mention of complication, not stated as uncontrolled,  increase independence/safety with selfcare tasks    Plan  Occupational Therapy Plan  Times Per Week: 4-6  Current Treatment Recommendations: Strengthening, ROM, Balance training, Functional mobility training, Endurance training, Pain management, Safety education & training, Patient/Caregiver education & training, Equipment evaluation, education, & procurement, Positioning, Self-Care / ADL    OT Individual Minutes  OT Individual Minutes  Time In: 0912  Time Out: 0956  Minutes: 44  Time Code Minutes   Timed Code Treatment Minutes: 23 Minutes    Co-eval with PT, individual minutes adjusted accordingly     Electronically signed by LINDSAY Hays on 4/27/25 at 11:58 AM EDT

## 2025-04-27 NOTE — PROGRESS NOTES
tablet by mouth 2 times daily as needed for Pain for up to 30 days. Intended supply: 30 days. Max Daily Amount: 2 tablets 4/9/25 5/9/25 Yes Karen Cardoso APRN - CNP   nitroGLYCERIN (NITROSTAT) 0.4 MG SL tablet Place 1 tablet under the tongue every 5 minutes as needed for Chest pain up to max of 3 total doses. If no relief after 1 dose, call 911. 4/1/25  Yes Rose Buckley MD   pantoprazole (PROTONIX) 40 MG tablet Take 1 tablet by mouth in the morning and at bedtime   Yes ProviderNick MD   dapagliflozin (FARXIGA) 5 MG tablet Take 1 tablet by mouth every morning   Yes Provider, MD Nick   metoprolol tartrate (LOPRESSOR) 25 MG tablet Take 0.5 tablets by mouth 2 times daily 2/19/25  Yes Rose Buckley MD   insulin glargine (LANTUS SOLOSTAR) 100 UNIT/ML injection pen INJECT 50 UNITS INTO THE SKIN EVERY MORNING AND 45 UNITS NIGHTLY.  Patient taking differently: INJECT 50 UNITS INTO THE SKIN EVERY MORNING AND 50 UNITS NIGHTLY. 2/11/25 2/11/26 Yes Carla Chua APRN - CNP   budesonide-formoterol (SYMBICORT) 160-4.5 MCG/ACT AERO INHALE 2 PUFFS INTO THE LUNGS 2 TIMES DAILY AS NEEDED FOR FOR SHORTNESS OF BREATH 11/26/24 11/26/25 Yes Carla Chua APRN - CNP   insulin aspart (NOVOLOG FLEXPEN) 100 UNIT/ML injection pen 5 units with each meal plus IF<139 NO INSULIN; 140-199-4 UN;200-249-8 UN;250-299-10 UN;300-349-12 UN;350-400=16 UN;ABOVE 400: 20 UNIT(S)  Patient taking differently: Inject 5-17 Units into the skin 3 times daily (before meals) 5 units with each meal plus IF<139 NO INSULIN; 140-199=2 units ;200-249= 4 units ;250-299= 6 units  ;300-349= 8 units  ;350-400=10 units ;ABOVE 400 = 12 units 11/13/24  Yes Carla Chua APRN - CNP   gabapentin (NEURONTIN) 100 MG capsule Take 1 capsule by mouth 2 times daily for 30 days. 11/3/24 4/25/25 Yes Genia Ramos MD   ranolazine (RANEXA) 500 MG extended release tablet Take 1 tablet by mouth 2 times daily 10/7/24  Yes Riley Fox MD  pain  RESPIRATORY:  negative for shortness of breath, cough, congestion, wheezing  CARDIOVASCULAR:  negative for chest pain, palpitations  GASTROINTESTINAL:  negative for nausea, vomiting, diarrhea, constipation, change in bowel habits, abdominal pain   GENITOURINARY:  negative for difficulty of urination, burning with urination, frequency   INTEGUMENT:  negative for rash, skin lesions, easy bruising   HEMATOLOGIC/LYMPHATIC:  negative for swelling/edema   ALLERGIC/IMMUNOLOGIC:  negative for urticaria , itching  ENDOCRINE:  negative increase in drinking, increase in urination, hot or cold intolerance  MUSCULOSKELETAL:  negative joint pains, muscle aches, swelling of joints  NEUROLOGICAL:  negative for headaches, dizziness, lightheadedness, numbness, pain, tingling extremities  BEHAVIOR/PSYCH:  negative for depression, anxiety    Physical Exam:   /64   Pulse 77   Temp 97.5 °F (36.4 °C)   Resp 18   Ht 1.575 m (5' 2\")   Wt 95 kg (209 lb 7 oz)   LMP  (LMP Unknown)   SpO2 95%   BMI 38.31 kg/m²   Temp (24hrs), Av.4 °F (36.9 °C), Min:97.5 °F (36.4 °C), Max:99.3 °F (37.4 °C)    Recent Labs     25  1625 25  0632 25  1122   POCGLU 199* 245* 165* 292*       Intake/Output Summary (Last 24 hours) at 2025 1249  Last data filed at 2025 0051  Gross per 24 hour   Intake --   Output 600 ml   Net -600 ml       General Appearance: alert, well appearing, and in no acute distress  Mental status: oriented to person, place, and time  Head: normocephalic, atraumatic  Eye: no icterus, redness, pupils equal and reactive, extraocular eye movements intact, conjunctiva clear  Ear: normal external ear, no discharge, hearing intact  Nose: no drainage noted  Mouth: mucous membranes moist  Neck: supple, no carotid bruits, thyroid not palpable  Lungs: Bilateral equal air entry, clear to ausculation, no wheezing, rales or rhonchi, normal effort  Cardiovascular: normal rate, regular rhythm, no

## 2025-04-27 NOTE — PLAN OF CARE
Problem: Chronic Conditions and Co-morbidities  Goal: Patient's chronic conditions and co-morbidity symptoms are monitored and maintained or improved  4/27/2025 0325 by Yvonne Muro RN  Outcome: Progressing  Flowsheets (Taken 4/26/2025 1945)  Care Plan - Patient's Chronic Conditions and Co-Morbidity Symptoms are Monitored and Maintained or Improved:   Monitor and assess patient's chronic conditions and comorbid symptoms for stability, deterioration, or improvement   Collaborate with multidisciplinary team to address chronic and comorbid conditions and prevent exacerbation or deterioration   Update acute care plan with appropriate goals if chronic or comorbid symptoms are exacerbated and prevent overall improvement and discharge  4/26/2025 1736 by Karolyn Gatica, RN  Outcome: Progressing     Problem: Discharge Planning  Goal: Discharge to home or other facility with appropriate resources  4/27/2025 0325 by Yvonne Muro RN  Outcome: Progressing  Flowsheets (Taken 4/26/2025 1945)  Discharge to home or other facility with appropriate resources:   Identify barriers to discharge with patient and caregiver   Arrange for needed discharge resources and transportation as appropriate   Identify discharge learning needs (meds, wound care, etc)   Refer to discharge planning if patient needs post-hospital services based on physician order or complex needs related to functional status, cognitive ability or social support system   Arrange for interpreters to assist at discharge as needed  4/26/2025 1736 by Karolyn Gatica, RN  Outcome: Progressing     Problem: Pain  Goal: Verbalizes/displays adequate comfort level or baseline comfort level  4/27/2025 0325 by Yvonne Muro RN  Outcome: Progressing  4/26/2025 1736 by Karolyn Gatica, RN  Outcome: Progressing     Problem: Skin/Tissue Integrity  Goal: Skin integrity remains intact  Description: 1.  Monitor for areas of redness and/or skin breakdown2.  Assess vascular access

## 2025-04-27 NOTE — PROGRESS NOTES
Lab called writer to report troponin of 22. Writer let Dr. Tang know and writer asked if he would like the patient on tele

## 2025-04-27 NOTE — CARE COORDINATION
DISCHARGE PLANNING NOTE:    Patient has been switched to inpatient status, first IMM obtained, copy placed in the chart.    Electronically signed by Karolyn Porter RN on 4/27/2025 at 1:55 PM

## 2025-04-27 NOTE — PROGRESS NOTES
[facility]   Physical Therapy Evaluation  Date: 25  Patient Name: Mariangel Abreu       Room: -  MRN: [id number]  Account: [id number]   : 1950  (74 y.o.) Gender: female     Discharge Recommendations:  Discharge Recommendations: Patient would benefit from continued therapy after discharge           Past Medical History:  has a past medical history of Abdominal bloating, Adenomatous polyp of ascending colon, Allergic rhinitis, Anxiety, Arthritis, Asthma, Atrial fibrillation ([facility]), Back pain, Benign hypertension with CKD (chronic kidney disease) stage III ([facility]), CAD (coronary artery disease), Caffeine use, CHF (congestive heart failure) ([facility]), Chronic kidney disease, CKD (chronic kidney disease) stage 3, GFR 30-59 ml/min ([facility]), Claudication, COPD (chronic obstructive pulmonary disease) ([facility]), Degeneration of lumbar or lumbosacral intervertebral disc, Depression, DM (diabetes mellitus) ([facility]), Emphysema of lung ([facility]), Gastritis, GERD (gastroesophageal reflux disease), Hearing loss, Hematuria, Hiatal hernia, History of loop recorder, HTN (hypertension), benign, Hypertriglyceridemia, Incontinence of urine, Irritable bowel syndrome with both constipation and diarrhea, Kidney stone, Knee arthropathy, Long term (current) use of anticoagulants, Lumbosacral spondylosis without myelopathy, Migraine headache, Mumps, Neuropathy, Obesity, On home oxygen therapy, HIGINIO on CPAP, Otitis media, Secondary diabetes mellitus with stage 3 chronic kidney disease (GFR 30-59) ([facility]), STEMI (ST elevation myocardial infarction) ([facility]), Stroke ([facility]), TIA (transient ischemic attack), Tinnitus, Type 2 diabetes mellitus without complication ([facility]), Type II or unspecified type diabetes mellitus without mention of complication, not stated as uncontrolled, UTI (lower urinary tract infection), and Varicose vein.  Past Surgical History:   has a past surgical history that includes Cholecystectomy ();

## 2025-04-27 NOTE — CARE COORDINATION
ONGOING DISCHARGE PLAN:    Patient is alert and oriented x4.    Spoke with patient regarding discharge plan and patient now states that she would be interested in going to a skilled nursing facility for some therapy. PT/OT at bedside to work with patient.                       Post Acute Facility/Agency List     Provided patient with the following list, the list includes the overall star ratings obtained from CMS per the Medicare Web site (www.Medicare.gov):     [] Long Term Acute Care Facilities  [] Acute Inpatient Rehabilitation Facilities  [x] Skilled Nursing Facilities  [] Hospice Facilities  [] Home Care    Provided verbal instructions on how to utilize the QR Code to obtain additional detailed star ratings from www.Medicare.gov     offered to print and provide the detailed list:    []Accepted   [x]Declined    The following printed detailed lists were provided:     Skilled Nursing Facilities    Patient requested referral to [facility], as that is where she has been in the past (faxed).     Cardiology-chest pain, plan for cardiac cath 4/28. Heparin GTT to be started this evening.     Will continue to follow for additional discharge needs.    If patient is discharged prior to next notation, then this note serves as note for discharge by case management.    Electronically signed by Karolyn Porter RN on 4/27/2025 at 9:11 AM

## 2025-04-27 NOTE — CONSULTS
[facility]  Inpatient Cardiology Consult             Date:   4/26/25  Patient name: Mariangel Abreu  Date of admission:  4/25/2025 12:17 PM  MRN:   [id number]  YOB: 1950      Reason for consultation:  Unstable angina    CHIEF COMPLAINT:  Chest pain     History Obtained From:  Patient and medical record    HISTORY OF PRESENT ILLNESS:      The patient is a 74 y.o woman admitted yesterday for unstable angina.     She has h/o COPD, HIGINIO, PAF, HTN, DM, CKD and CAD s/p inferior STEMI with PCI to RCA in Feb 2024, followed by staged PCI to the LAD in March 2024.  She was admitted in March for recurrent CP and catheterization on 3/31/25 showed the prior RCA and LAD stents patent, with new stenosis in the distal LAD treated with ELEANOR.  She was also found to have bifurcation stenosis in the OM1 and OM2 and was scheduled for staged PCI on 5/19.    Over the past week she has had intermitted substernal chest pain and pressure similar to her previous angina, with partial relief with SL NTG.  EKG shows no acute changes with serial troponins of 12 and 13 ng/L.      Past Medical History:   has a past medical history of Abdominal bloating, Adenomatous polyp of ascending colon, Allergic rhinitis, Anxiety, Arthritis, Asthma, Atrial fibrillation ([facility]), Back pain, Benign hypertension with CKD (chronic kidney disease) stage III ([facility]), CAD (coronary artery disease), Caffeine use, CHF (congestive heart failure) ([facility]), Chronic kidney disease, CKD (chronic kidney disease) stage 3, GFR 30-59 ml/min ([facility]), Claudication, COPD (chronic obstructive pulmonary disease) ([facility]), Degeneration of lumbar or lumbosacral intervertebral disc, Depression, DM (diabetes mellitus) ([facility]), Emphysema of lung ([facility]), Gastritis, GERD (gastroesophageal reflux disease), Hearing loss, Hematuria, Hiatal hernia, History of loop recorder, HTN (hypertension), benign, Hypertriglyceridemia, Incontinence of urine, Irritable bowel syndrome with  complaints.  Integumentary: No rash or pruritis.  Neurological: No headache, diplopia, change in muscle strength, numbness or tingling. No change in gait, balance, coordination, mood, affect, memory, mentation, behavior.  Psychiatric: No anxiety, or depression.  Endocrine: No temperature intolerance. No excessive thirst, fluid intake, or urination. No tremor.  Hematologic/Lymphatic: No abnormal bruising or bleeding, blood clots or swollen lymph nodes.  Allergic/Immunologic: No nasal congestion or hives.    PHYSICAL EXAM:    Physical Examination:    BP (!) 107/47   Pulse 72   Temp 99.3 °F (37.4 °C) (Axillary)   Resp 17   Ht 1.575 m (5' 2\")   Wt 95 kg (209 lb 7 oz)   LMP  (LMP Unknown)   SpO2 96%   BMI 38.31 kg/m²    Constitutional and General Appearance: alert, cooperative, no distress and appears stated age  HEENT: PERRL, no cervical lymphadenopathy. No masses palpable. Normal oral mucosa  Respiratory:  Normal excursion and expansion without use of accessory muscles  Resp Auscultation: Good respiratory effort. No for increased work of breathing. On auscultation: clear to auscultation bilaterally  Cardiovascular:  The apical impulse is not displaced  Heart tones are crisp and normal. regular S1 and S2. Murmurs:  None  Jugular venous pulsation Normal  The carotid upstroke is normal in amplitude and contour without delay or bruit  Peripheral pulses are symmetrical and full   Abdomen:  No masses or tenderness  Bowel sounds present  Extremities:   No Cyanosis or Clubbing   Lower extremity edema: No   Skin: Warm and dry  Neurological:  Alert and oriented.  Moves all extremities well  No abnormalities of mood, affect, memory, mentation, or behavior are noted    DATA:    Diagnostics:      EKG: Sinus rhythm, left axis deviation; non-specific ST and T wave abnormality      CARDIAC CATHETERIZATION ( 3/31/25)      Right radial 6 Romanian arterial access.    Right coronary angiogram was performed using 6 Romanian JR

## 2025-04-28 ENCOUNTER — TRANSCRIBE ORDERS (OUTPATIENT)
Dept: ADMINISTRATIVE | Age: 75
End: 2025-04-28

## 2025-04-28 DIAGNOSIS — N95.0 POSTMENOPAUSAL BLEEDING: Primary | ICD-10-CM

## 2025-04-28 LAB
ANION GAP SERPL CALCULATED.3IONS-SCNC: 7 MMOL/L (ref 9–16)
ANTI-XA UNFRAC HEPARIN: 0.39 IU/L (ref 0.3–0.7)
ANTI-XA UNFRAC HEPARIN: <0.1 IU/L (ref 0.3–0.7)
BASOPHILS # BLD: 0.16 K/UL (ref 0–0.2)
BASOPHILS NFR BLD: 3 % (ref 0–2)
BUN SERPL-MCNC: 21 MG/DL (ref 8–23)
CALCIUM SERPL-MCNC: 8.8 MG/DL (ref 8.6–10.4)
CHLORIDE SERPL-SCNC: 104 MMOL/L (ref 98–107)
CO2 SERPL-SCNC: 24 MMOL/L (ref 20–31)
CREAT SERPL-MCNC: 1.3 MG/DL (ref 0.7–1.2)
EOSINOPHIL # BLD: 0.16 K/UL (ref 0–0.4)
EOSINOPHILS RELATIVE PERCENT: 3 % (ref 0–4)
ERYTHROCYTE [DISTWIDTH] IN BLOOD BY AUTOMATED COUNT: 15.5 % (ref 11.5–14.9)
GFR, ESTIMATED: 43 ML/MIN/1.73M2
GLUCOSE BLD-MCNC: 202 MG/DL (ref 65–105)
GLUCOSE BLD-MCNC: 222 MG/DL (ref 65–105)
GLUCOSE BLD-MCNC: 240 MG/DL (ref 65–105)
GLUCOSE BLD-MCNC: 247 MG/DL (ref 65–105)
GLUCOSE SERPL-MCNC: 255 MG/DL (ref 74–99)
HCT VFR BLD AUTO: 38 % (ref 36–46)
HGB BLD-MCNC: 12.5 G/DL (ref 12–16)
LYMPHOCYTES NFR BLD: 1.11 K/UL (ref 1–4.8)
LYMPHOCYTES RELATIVE PERCENT: 21 % (ref 24–44)
MCH RBC QN AUTO: 29.5 PG (ref 26–34)
MCHC RBC AUTO-ENTMCNC: 32.9 G/DL (ref 31–37)
MCV RBC AUTO: 89.8 FL (ref 80–100)
MONOCYTES NFR BLD: 2.17 K/UL (ref 0.1–1.3)
MONOCYTES NFR BLD: 41 % (ref 1–7)
MORPHOLOGY: NORMAL
NEUTROPHILS NFR BLD: 32 % (ref 36–66)
NEUTS SEG NFR BLD: 1.7 K/UL (ref 1.3–9.1)
PLATELET # BLD AUTO: 187 K/UL (ref 150–450)
PMV BLD AUTO: 8.9 FL (ref 6–12)
POTASSIUM SERPL-SCNC: 4.1 MMOL/L (ref 3.7–5.3)
RBC # BLD AUTO: 4.23 M/UL (ref 4–5.2)
SODIUM SERPL-SCNC: 135 MMOL/L (ref 136–145)
WBC OTHER # BLD: 5.3 K/UL (ref 3.5–11)

## 2025-04-28 PROCEDURE — 97110 THERAPEUTIC EXERCISES: CPT

## 2025-04-28 PROCEDURE — 97116 GAIT TRAINING THERAPY: CPT

## 2025-04-28 PROCEDURE — 6370000000 HC RX 637 (ALT 250 FOR IP): Performed by: NURSE PRACTITIONER

## 2025-04-28 PROCEDURE — 6370000000 HC RX 637 (ALT 250 FOR IP)

## 2025-04-28 PROCEDURE — 36415 COLL VENOUS BLD VENIPUNCTURE: CPT

## 2025-04-28 PROCEDURE — 1200000000 HC SEMI PRIVATE

## 2025-04-28 PROCEDURE — 80048 BASIC METABOLIC PNL TOTAL CA: CPT

## 2025-04-28 PROCEDURE — 99233 SBSQ HOSP IP/OBS HIGH 50: CPT | Performed by: NURSE PRACTITIONER

## 2025-04-28 PROCEDURE — 94660 CPAP INITIATION&MGMT: CPT

## 2025-04-28 PROCEDURE — 6360000002 HC RX W HCPCS: Performed by: INTERNAL MEDICINE

## 2025-04-28 PROCEDURE — 82947 ASSAY GLUCOSE BLOOD QUANT: CPT

## 2025-04-28 PROCEDURE — 2700000000 HC OXYGEN THERAPY PER DAY

## 2025-04-28 PROCEDURE — 94640 AIRWAY INHALATION TREATMENT: CPT

## 2025-04-28 PROCEDURE — 94761 N-INVAS EAR/PLS OXIMETRY MLT: CPT

## 2025-04-28 PROCEDURE — 85520 HEPARIN ASSAY: CPT

## 2025-04-28 PROCEDURE — 99233 SBSQ HOSP IP/OBS HIGH 50: CPT | Performed by: INTERNAL MEDICINE

## 2025-04-28 PROCEDURE — 85025 COMPLETE CBC W/AUTO DIFF WBC: CPT

## 2025-04-28 RX ORDER — HEPARIN SODIUM 10000 [USP'U]/100ML
5-30 INJECTION, SOLUTION INTRAVENOUS CONTINUOUS
Status: DISPENSED | OUTPATIENT
Start: 2025-04-28 | End: 2025-04-29

## 2025-04-28 RX ADMIN — BUDESONIDE AND FORMOTEROL FUMARATE DIHYDRATE 2 PUFF: 160; 4.5 AEROSOL RESPIRATORY (INHALATION) at 19:12

## 2025-04-28 RX ADMIN — TOPIRAMATE 100 MG: 100 TABLET, FILM COATED ORAL at 21:33

## 2025-04-28 RX ADMIN — INSULIN GLARGINE 30 UNITS: 100 INJECTION, SOLUTION SUBCUTANEOUS at 08:22

## 2025-04-28 RX ADMIN — RANOLAZINE 500 MG: 500 TABLET, EXTENDED RELEASE ORAL at 21:33

## 2025-04-28 RX ADMIN — PANTOPRAZOLE SODIUM 40 MG: 40 TABLET, DELAYED RELEASE ORAL at 21:33

## 2025-04-28 RX ADMIN — MELATONIN TAB 3 MG 3 MG: 3 TAB at 21:38

## 2025-04-28 RX ADMIN — SODIUM CHLORIDE AND POTASSIUM CHLORIDE: 9; 1.49 INJECTION, SOLUTION INTRAVENOUS at 10:39

## 2025-04-28 RX ADMIN — INSULIN GLARGINE 30 UNITS: 100 INJECTION, SOLUTION SUBCUTANEOUS at 21:33

## 2025-04-28 RX ADMIN — ACETAMINOPHEN 650 MG: 325 TABLET ORAL at 14:28

## 2025-04-28 RX ADMIN — HEPARIN SODIUM 4000 UNITS: 1000 INJECTION INTRAVENOUS; SUBCUTANEOUS at 22:18

## 2025-04-28 RX ADMIN — DOCUSATE SODIUM 100 MG: 100 CAPSULE, LIQUID FILLED ORAL at 21:33

## 2025-04-28 RX ADMIN — BUDESONIDE AND FORMOTEROL FUMARATE DIHYDRATE 2 PUFF: 160; 4.5 AEROSOL RESPIRATORY (INHALATION) at 10:27

## 2025-04-28 RX ADMIN — TICAGRELOR 90 MG: 90 TABLET ORAL at 21:33

## 2025-04-28 ASSESSMENT — PAIN DESCRIPTION - LOCATION: LOCATION: HEAD

## 2025-04-28 ASSESSMENT — PAIN DESCRIPTION - DESCRIPTORS: DESCRIPTORS: ACHING

## 2025-04-28 ASSESSMENT — PAIN DESCRIPTION - ORIENTATION: ORIENTATION: MID

## 2025-04-28 ASSESSMENT — PAIN SCALES - GENERAL: PAINLEVEL_OUTOF10: 0

## 2025-04-28 NOTE — PLAN OF CARE
Problem: Chronic Conditions and Co-morbidities  Goal: Patient's chronic conditions and co-morbidity symptoms are monitored and maintained or improved  Outcome: Progressing  Flowsheets (Taken 4/27/2025 1950)  Care Plan - Patient's Chronic Conditions and Co-Morbidity Symptoms are Monitored and Maintained or Improved:   Monitor and assess patient's chronic conditions and comorbid symptoms for stability, deterioration, or improvement   Collaborate with multidisciplinary team to address chronic and comorbid conditions and prevent exacerbation or deterioration   Update acute care plan with appropriate goals if chronic or comorbid symptoms are exacerbated and prevent overall improvement and discharge     Problem: Discharge Planning  Goal: Discharge to home or other facility with appropriate resources  Outcome: Progressing  Flowsheets (Taken 4/27/2025 1950)  Discharge to home or other facility with appropriate resources:   Identify barriers to discharge with patient and caregiver   Arrange for needed discharge resources and transportation as appropriate   Identify discharge learning needs (meds, wound care, etc)   Arrange for interpreters to assist at discharge as needed   Refer to discharge planning if patient needs post-hospital services based on physician order or complex needs related to functional status, cognitive ability or social support system     Problem: Pain  Goal: Verbalizes/displays adequate comfort level or baseline comfort level  Outcome: Progressing  Flowsheets (Taken 4/27/2025 1930)  Verbalizes/displays adequate comfort level or baseline comfort level:   Encourage patient to monitor pain and request assistance   Assess pain using appropriate pain scale   Administer analgesics based on type and severity of pain and evaluate response   Consider cultural and social influences on pain and pain management   Implement non-pharmacological measures as appropriate and evaluate response   Notify Licensed  cessation protocol as indicated   Oxygen supplementation based on oxygen saturation or arterial blood gases   Position to facilitate oxygenation and minimize respiratory effort   Assess for changes in mentation and behavior     Problem: ABCDS Injury Assessment  Goal: Absence of physical injury  Outcome: Progressing  Flowsheets (Taken 4/27/2025 1950)  Absence of Physical Injury: Implement safety measures based on patient assessment

## 2025-04-28 NOTE — PROGRESS NOTES
[facility]   Progress Note                   Date:   4/27/25  Patient name: Mariangel Abreu  Date of admission:  4/25/2025 12:17 PM  MRN:   [id number]  YOB: 1950  PCP: Nuzhat Ramos DTR    Reason for Admission:     Subjective:       Clinical Changes / Abnormalities:  Pt feeling well with no chest pain today; denies SOB, palpitation or lightheadedness.      Medications:   Scheduled Meds:   sodium chloride flush  5-40 mL IntraVENous 2 times per day    aspirin  81 mg Oral Daily    atorvastatin  80 mg Oral Daily    budesonide-formoterol  2 puff Inhalation BID RT    docusate sodium  100 mg Oral BID    insulin glargine  30 Units SubCUTAneous BID    pantoprazole  40 mg Oral BID    ticagrelor  90 mg Oral BID    topiramate  100 mg Oral Nightly    ranolazine  500 mg Oral BID     Continuous Infusions:   0.9% NaCl with KCl 20 mEq 75 mL/hr at 04/27/25 2039    sodium chloride      dextrose       CBC:   Recent Labs     04/25/25  1246 04/27/25  0932 04/28/25  0501   WBC 7.8 5.5 5.3   HGB 13.8 13.1 12.5    192 187     BMP:    Recent Labs     04/25/25  1246 04/28/25  0501   * 135*   K 4.1 4.1   CL 99 104   CO2 25 24   BUN 17 21   CREATININE 1.0 1.3*   GLUCOSE 127* 255*     Hepatic:   Recent Labs     04/25/25  1246   AST 21   ALT 13   BILITOT 0.3   ALKPHOS 71     Troponin: No results for input(s): \"TROPONINI\" in the last 72 hours.  BNP: No results for input(s): \"BNP\" in the last 72 hours.  Lipids: No results for input(s): \"CHOL\", \"HDL\" in the last 72 hours.    Invalid input(s): \"LDLCALCU\"  INR:   Recent Labs     04/27/25  1123   INR 1.0       Objective:   Vitals: BP (!) 144/59   Pulse 69   Temp 97.7 °F (36.5 °C)   Resp 16   Ht 1.575 m (5' 2\")   Wt 95 kg (209 lb 7 oz)   LMP  (LMP Unknown)   SpO2 99%   BMI 38.31 kg/m²   General appearance: alert and cooperative with exam  HEENT: Head: Normocephalic, no lesions, without obvious abnormality.  Neck: no adenopathy, no carotid bruit,

## 2025-04-28 NOTE — PROGRESS NOTES
Received call from [facility] patient's heart cath is scheduled for tomorrow as there was no room to schedule today.  Face sheet/Medical Necessity form faxed to [facility] for transport tomorrow at 07:30.  Patients procedure scheduled for 0800 at [facility].

## 2025-04-28 NOTE — PROGRESS NOTES
[facility]   Physical Therapy Treatment  Date: 25  Patient Name: Mariangel Abreu       Room: 4-  MRN: [id number]  Account: [id number]   : 1950  (74 y.o.) Gender: female     Discharge Recommendations:  Patient would benefit from continued therapy after discharge               Past Medical History:  has a past medical history of Abdominal bloating, Adenomatous polyp of ascending colon, Allergic rhinitis, Anxiety, Arthritis, Asthma, Atrial fibrillation ([facility]), Back pain, Benign hypertension with CKD (chronic kidney disease) stage III ([facility]), CAD (coronary artery disease), Caffeine use, CHF (congestive heart failure) ([facility]), Chronic kidney disease, CKD (chronic kidney disease) stage 3, GFR 30-59 ml/min ([facility]), Claudication, COPD (chronic obstructive pulmonary disease) ([facility]), Degeneration of lumbar or lumbosacral intervertebral disc, Depression, DM (diabetes mellitus) ([facility]), Emphysema of lung ([facility]), Gastritis, GERD (gastroesophageal reflux disease), Hearing loss, Hematuria, Hiatal hernia, History of loop recorder, HTN (hypertension), benign, Hypertriglyceridemia, Incontinence of urine, Irritable bowel syndrome with both constipation and diarrhea, Kidney stone, Knee arthropathy, Long term (current) use of anticoagulants, Lumbosacral spondylosis without myelopathy, Migraine headache, Mumps, Neuropathy, Obesity, On home oxygen therapy, HIGINIO on CPAP, Otitis media, Secondary diabetes mellitus with stage 3 chronic kidney disease (GFR 30-59) ([facility]), STEMI (ST elevation myocardial infarction) ([facility]), Stroke ([facility]), TIA (transient ischemic attack), Tinnitus, Type 2 diabetes mellitus without complication ([facility]), Type II or unspecified type diabetes mellitus without mention of complication, not stated as uncontrolled, UTI (lower urinary tract infection), and Varicose vein.  Past Surgical History:   has a past surgical history that includes Cholecystectomy (); Carpal tunnel release

## 2025-04-28 NOTE — PROGRESS NOTES
[facility]   INPATIENT OCCUPATIONAL THERAPY  PROGRESS NOTE  Date: 2025  Patient Name: Mariangel Abreu       Room:   MRN: [id number]    : 1950  (74 y.o.)  Gender: female   Referring Practitioner: Jhony Hoyos MD  Diagnosis: Chest pain      Discharge Recommendations:  Further Occupational Therapy is recommended upon facility discharge.    OT Equipment Recommendations  Other: TBD    Restrictions/Precautions  Restrictions/Precautions  Restrictions/Precautions: Fall Risk;General Precautions;Other (Comment) (check orthostatic BP)  Activity Level: Up as Tolerated  Required Braces or Orthoses?: No  Implants Present? :  (loop recorder)  Position Activity Restriction  Other Position/Activity Restrictions: 2L 02 (baseline), pure wick catheter    O2 Device: Nasal cannula    Subjective  Subjective  Subjective: Pt agreeable to BUE ex's only derferring standing and functional mobility this date  Pain  Pre-Pain: 0  Post-Pain: 0  Pain Location:  (no pain noted by patient)       Objective  Orientation  Overall Orientation Status: Within Functional Limits  Orientation Level: Oriented X4  Cognition  Overall Cognitive Status: WFL        Transfers/Mobility  Bed mobility  Bed Mobility Comments: did not occur-pt defers  Transfers  Transfer Comments: did not occur-pt defers         OT Exercises  Exercise Treatment: QUINTANA faciliatated pt in BUE exercises for increased strength and activity tolerance. Pt completes elbow flexion/extension, shoulder flexion and horizontal abduction/adduction, shoulder protecttion/retracttion, wrist flex/ext and forearm sup/prontation X10  With no c/o dizziness    Patient Education  Patient Education  Education Given To: Patient  Education Provided: Role of Therapy, Plan of Care, Home Exercise Program  Education Provided Comments: OT POC, benefits of use of BSC vs purewick for maintaining skin integrity/increased independence  Education Method:

## 2025-04-28 NOTE — CARE COORDINATION
DISCHARGE PLANNING NOTE:    LSW following for potential discharge to SNF. Patient has been accepted by Chuck Ricardo in admissions. Will need insurance authorization once closer to being medically ready for discharge. Cardiac cath scheduled for tomorrow at 0800.

## 2025-04-28 NOTE — H&P (VIEW-ONLY)
[facility]   Progress Note                   Date:   4/27/25  Patient name: Mariangel Abreu  Date of admission:  4/25/2025 12:17 PM  MRN:   [id number]  YOB: 1950  PCP: Nuzhat Ramos DTR    Reason for Admission:     Subjective:       Clinical Changes / Abnormalities:  Pt feeling well with no chest pain today; denies SOB, palpitation or lightheadedness.  [facility] is unable to accommodate pt for cardiac cath today.  Pt sitting up in chair.  NSR on tele.       Medications:   Scheduled Meds:   sodium chloride flush  5-40 mL IntraVENous 2 times per day    aspirin  81 mg Oral Daily    atorvastatin  80 mg Oral Daily    budesonide-formoterol  2 puff Inhalation BID RT    docusate sodium  100 mg Oral BID    insulin glargine  30 Units SubCUTAneous BID    pantoprazole  40 mg Oral BID    ticagrelor  90 mg Oral BID    topiramate  100 mg Oral Nightly    ranolazine  500 mg Oral BID     Continuous Infusions:   0.9% NaCl with KCl 20 mEq 75 mL/hr at 04/27/25 2039    sodium chloride      dextrose       CBC:   Recent Labs     04/25/25  1246 04/27/25  0932 04/28/25  0501   WBC 7.8 5.5 5.3   HGB 13.8 13.1 12.5    192 187     BMP:    Recent Labs     04/25/25  1246 04/28/25  0501   * 135*   K 4.1 4.1   CL 99 104   CO2 25 24   BUN 17 21   CREATININE 1.0 1.3*   GLUCOSE 127* 255*     Hepatic:   Recent Labs     04/25/25  1246   AST 21   ALT 13   BILITOT 0.3   ALKPHOS 71     Troponin: No results for input(s): \"TROPONINI\" in the last 72 hours.  BNP: No results for input(s): \"BNP\" in the last 72 hours.  Lipids: No results for input(s): \"CHOL\", \"HDL\" in the last 72 hours.    Invalid input(s): \"LDLCALCU\"  INR:   Recent Labs     04/27/25  1123   INR 1.0       Objective:   Vitals: BP (!) 144/59   Pulse 69   Temp 97.7 °F (36.5 °C)   Resp 16   Ht 1.575 m (5' 2\")   Wt 95 kg (209 lb 7 oz)   LMP  (LMP Unknown)   SpO2 99%   BMI 38.31 kg/m²   General appearance: alert and cooperative with exam  HEENT: Head:

## 2025-04-28 NOTE — PROGRESS NOTES
[facility] Internal Medicine   Mejia Kelly MD; MD Ivana Arnold MD, MD , Sammy Hoyos MD    [facility] Internal Medicine   IN-PATIENT SERVICE   [facility]    Progress note             Date:   4/28/2025  Patient name:  Mariangel Abreu  Date of admission:  4/25/2025 12:17 PM  MRN:   [id number]  Account:  [id number]  YOB: 1950  PCP:    Nuzhat Ramos DTR  Room:   2044/2044-01  Code Status:    Full Code    Chief Complaint:     Chief Complaint   Patient presents with    Chest Pain       History Obtained From:     patient, electronic medical record    History of Present Illness:     Mariangel Abreu is a 74 y.o. Non- / non  female who presents with Chest Pain   and is admitted to the hospital for the management of Chest pain.    74-year-old female with past medical history as mentioned below presented to [facility] emergency department for chest pain, which she describes as dull and because of her past medical history of stents prompted her to come here.  Denies any shortness of breath but complaining of dizziness when she is not in to the bathroom and came back at home.  Admitted for management of chest pain.  4/27  Patient, has multiple medical problem which has morbid obesity, HIGINIO on CPAP, COPD, and artery disease, status post recent stent, patient had cardiac angiogram done in March of this year drug-eluting stent was placed, patient told me that she will have second stage PCI on 19 May  Claimed that she is compliant with her diet medication  Chest pain is improved from yesterday, but still bothering her  On Ranexa   Past Medical History:     Past Medical History:   Diagnosis Date    Abdominal bloating 01/26/2021    Adenomatous polyp of ascending colon 01/26/2021    Allergic rhinitis     Anxiety 07/17/2013    Arthritis     Asthma     Atrial fibrillation (HCC)     Back pain     NERVE/DR. GRACIA     coronary intervention performed by Victoriano Dee MD at [facility] CARDIAC CATH LAB    CARDIAC PROCEDURE N/A 3/31/2025    ali / Left heart cath / coronary angiography / op scmh performed by Antonio Robins MD at [facility] CARDIAC CATH LAB    CARDIAC PROCEDURE N/A 3/31/2025    Percutaneous coronary intervention performed by Antonio Robins MD at [facility] CARDIAC CATH LAB    CARPAL TUNNEL RELEASE Right     CATARACT REMOVAL WITH IMPLANT Bilateral     CHOLECYSTECTOMY  2007    COLONOSCOPY      COLONOSCOPY  04/10/2017    tubular adenomo polyp , mod. sigmoid diverticulosis, min. int. hemorrhoids    COLONOSCOPY N/A 3/2/2021    COLONOSCOPY WITH BIOPSY performed by Moris Brenner MD at [facility] ENDO    CYSTOSCOPY  9/25/2013    DILATION AND CURETTAGE  1979    EYE SURGERY      laser    INCONTINENCE SURGERY      Percutaneous nerve stimulation Dr Augie Amaya 2013     INSERTABLE CARDIAC MONITOR  12/04/2015    [employer] REVEAL LINQ MODEL #[id number] MRI CONDTIONAL OK 3T. IMMEDIATELY POST IMPLANT    INVASIVE VASCULAR N/A 3/31/2025    Ultrasound guided vascular access performed by Antonio Robins MD at [facility] CARDIAC CATH LAB    OTHER SURGICAL HISTORY  09/10/15    removal of interstim    ME COLSC FLX W/REMOVAL LESION BY HOT BX FORCEPS N/A 4/10/2017    COLONOSCOPY POLYPECTOMY HOT BIOPSY performed by Ksenia Marroquin MD at [facility] OR    TONSILLECTOMY      TUBAL LIGATION      TYMPANOPLASTY      TYMPANOPLASTY      TYMPANOSTOMY TUBE PLACEMENT  08/21/2017    UPPER GASTROINTESTINAL ENDOSCOPY  03/2016    Dr Freeman    UPPER GASTROINTESTINAL ENDOSCOPY N/A 3/2/2021    EGD BIOPSY performed by Moris Brenner MD at [facility] ENDO        Medications Prior to Admission:     Prior to Admission medications    Medication Sig Start Date End Date Taking? Authorizing Provider   ticagrelor (BRILINTA) 90 MG TABS tablet Take 1 tablet by mouth 2 times daily 4/23/25  Yes Karen Crain, APRN - CNP   oxyCODONE-acetaminophen (PERCOCET) 5-325 MG per tablet Take 1 tablet by mouth 2 times

## 2025-04-28 NOTE — PROGRESS NOTES
[facility]   Progress Note                   Date:   4/27/25  Patient name: Mariangel Abreu  Date of admission:  4/25/2025 12:17 PM  MRN:   [id number]  YOB: 1950  PCP: Nuzhat Ramos DTR    Reason for Admission:     Subjective:       Clinical Changes / Abnormalities:  Pt feeling well with no chest pain today; denies SOB, palpitation or lightheadedness.  [facility] is unable to accommodate pt for cardiac cath today.  Pt sitting up in chair.  NSR on tele.       Medications:   Scheduled Meds:   sodium chloride flush  5-40 mL IntraVENous 2 times per day    aspirin  81 mg Oral Daily    atorvastatin  80 mg Oral Daily    budesonide-formoterol  2 puff Inhalation BID RT    docusate sodium  100 mg Oral BID    insulin glargine  30 Units SubCUTAneous BID    pantoprazole  40 mg Oral BID    ticagrelor  90 mg Oral BID    topiramate  100 mg Oral Nightly    ranolazine  500 mg Oral BID     Continuous Infusions:   0.9% NaCl with KCl 20 mEq 75 mL/hr at 04/27/25 2039    sodium chloride      dextrose       CBC:   Recent Labs     04/25/25  1246 04/27/25  0932 04/28/25  0501   WBC 7.8 5.5 5.3   HGB 13.8 13.1 12.5    192 187     BMP:    Recent Labs     04/25/25  1246 04/28/25  0501   * 135*   K 4.1 4.1   CL 99 104   CO2 25 24   BUN 17 21   CREATININE 1.0 1.3*   GLUCOSE 127* 255*     Hepatic:   Recent Labs     04/25/25  1246   AST 21   ALT 13   BILITOT 0.3   ALKPHOS 71     Troponin: No results for input(s): \"TROPONINI\" in the last 72 hours.  BNP: No results for input(s): \"BNP\" in the last 72 hours.  Lipids: No results for input(s): \"CHOL\", \"HDL\" in the last 72 hours.    Invalid input(s): \"LDLCALCU\"  INR:   Recent Labs     04/27/25  1123   INR 1.0       Objective:   Vitals: BP (!) 144/59   Pulse 69   Temp 97.7 °F (36.5 °C)   Resp 16   Ht 1.575 m (5' 2\")   Wt 95 kg (209 lb 7 oz)   LMP  (LMP Unknown)   SpO2 99%   BMI 38.31 kg/m²   General appearance: alert and cooperative with exam  HEENT: Head:

## 2025-04-29 ENCOUNTER — HOSPITAL ENCOUNTER (INPATIENT)
Age: 75
LOS: 1 days | Discharge: HOME | DRG: 322 | End: 2025-04-30
Attending: INTERNAL MEDICINE | Admitting: INTERNAL MEDICINE
Payer: MEDICARE

## 2025-04-29 VITALS
SYSTOLIC BLOOD PRESSURE: 131 MMHG | RESPIRATION RATE: 18 BRPM | TEMPERATURE: 97.7 F | HEIGHT: 62 IN | DIASTOLIC BLOOD PRESSURE: 54 MMHG | HEART RATE: 71 BPM | WEIGHT: 209.44 LBS | BODY MASS INDEX: 38.54 KG/M2 | OXYGEN SATURATION: 96 %

## 2025-04-29 DIAGNOSIS — I20.9 ANGINA PECTORIS: ICD-10-CM

## 2025-04-29 DIAGNOSIS — I20.0 UNSTABLE ANGINA (HCC): Primary | ICD-10-CM

## 2025-04-29 PROBLEM — Z95.820 S/P ANGIOPLASTY WITH STENT: Status: ACTIVE | Noted: 2025-04-29

## 2025-04-29 LAB
ANTI-XA UNFRAC HEPARIN: 0.79 IU/L (ref 0.3–0.7)
EKG ATRIAL RATE: 72 BPM
EKG P AXIS: 56 DEGREES
EKG P-R INTERVAL: 134 MS
EKG Q-T INTERVAL: 406 MS
EKG QRS DURATION: 70 MS
EKG QTC CALCULATION (BAZETT): 444 MS
EKG R AXIS: -46 DEGREES
EKG T AXIS: 87 DEGREES
EKG VENTRICULAR RATE: 72 BPM
GLUCOSE BLD-MCNC: 182 MG/DL (ref 65–105)
GLUCOSE BLD-MCNC: 278 MG/DL (ref 65–105)

## 2025-04-29 PROCEDURE — 94640 AIRWAY INHALATION TREATMENT: CPT

## 2025-04-29 PROCEDURE — 2580000003 HC RX 258: Performed by: INTERNAL MEDICINE

## 2025-04-29 PROCEDURE — 82947 ASSAY GLUCOSE BLOOD QUANT: CPT

## 2025-04-29 PROCEDURE — 92920 PRQ TRLUML C ANGIOP 1ART&/BR: CPT | Performed by: INTERNAL MEDICINE

## 2025-04-29 PROCEDURE — 99152 MOD SED SAME PHYS/QHP 5/>YRS: CPT | Performed by: INTERNAL MEDICINE

## 2025-04-29 PROCEDURE — C9600 PERC DRUG-EL COR STENT SING: HCPCS | Performed by: INTERNAL MEDICINE

## 2025-04-29 PROCEDURE — 6360000002 HC RX W HCPCS: Performed by: INTERNAL MEDICINE

## 2025-04-29 PROCEDURE — 92978 ENDOLUMINL IVUS OCT C 1ST: CPT | Performed by: INTERNAL MEDICINE

## 2025-04-29 PROCEDURE — 2500000003 HC RX 250 WO HCPCS: Performed by: STUDENT IN AN ORGANIZED HEALTH CARE EDUCATION/TRAINING PROGRAM

## 2025-04-29 PROCEDURE — 6370000000 HC RX 637 (ALT 250 FOR IP)

## 2025-04-29 PROCEDURE — C1769 GUIDE WIRE: HCPCS | Performed by: INTERNAL MEDICINE

## 2025-04-29 PROCEDURE — C1753 CATH, INTRAVAS ULTRASOUND: HCPCS | Performed by: INTERNAL MEDICINE

## 2025-04-29 PROCEDURE — 85520 HEPARIN ASSAY: CPT

## 2025-04-29 PROCEDURE — 92928 PRQ TCAT PLMT NTRAC ST 1 LES: CPT | Performed by: INTERNAL MEDICINE

## 2025-04-29 PROCEDURE — 92929 PR PRQ TRLUML CORONARY STENT W/ANGIO ADDL ART/BRNCH: CPT | Performed by: INTERNAL MEDICINE

## 2025-04-29 PROCEDURE — 027035Z DILATION OF CORONARY ARTERY, ONE ARTERY WITH TWO DRUG-ELUTING INTRALUMINAL DEVICES, PERCUTANEOUS APPROACH: ICD-10-PCS | Performed by: INTERNAL MEDICINE

## 2025-04-29 PROCEDURE — 92979 ENDOLUMINL IVUS OCT C EA: CPT | Performed by: INTERNAL MEDICINE

## 2025-04-29 PROCEDURE — 76937 US GUIDE VASCULAR ACCESS: CPT | Performed by: INTERNAL MEDICINE

## 2025-04-29 PROCEDURE — 4A023N7 MEASUREMENT OF CARDIAC SAMPLING AND PRESSURE, LEFT HEART, PERCUTANEOUS APPROACH: ICD-10-PCS | Performed by: INTERNAL MEDICINE

## 2025-04-29 PROCEDURE — 6360000004 HC RX CONTRAST MEDICATION: Performed by: INTERNAL MEDICINE

## 2025-04-29 PROCEDURE — 6370000000 HC RX 637 (ALT 250 FOR IP): Performed by: STUDENT IN AN ORGANIZED HEALTH CARE EDUCATION/TRAINING PROGRAM

## 2025-04-29 PROCEDURE — 99232 SBSQ HOSP IP/OBS MODERATE 35: CPT | Performed by: INTERNAL MEDICINE

## 2025-04-29 PROCEDURE — 2060000000 HC ICU INTERMEDIATE R&B

## 2025-04-29 PROCEDURE — 94761 N-INVAS EAR/PLS OXIMETRY MLT: CPT

## 2025-04-29 PROCEDURE — C1725 CATH, TRANSLUMIN NON-LASER: HCPCS | Performed by: INTERNAL MEDICINE

## 2025-04-29 PROCEDURE — 7100000011 HC PHASE II RECOVERY - ADDTL 15 MIN: Performed by: INTERNAL MEDICINE

## 2025-04-29 PROCEDURE — 6370000000 HC RX 637 (ALT 250 FOR IP): Performed by: INTERNAL MEDICINE

## 2025-04-29 PROCEDURE — C1760 CLOSURE DEV, VASC: HCPCS | Performed by: INTERNAL MEDICINE

## 2025-04-29 PROCEDURE — 7100000010 HC PHASE II RECOVERY - FIRST 15 MIN: Performed by: INTERNAL MEDICINE

## 2025-04-29 PROCEDURE — 93010 ELECTROCARDIOGRAM REPORT: CPT | Performed by: INTERNAL MEDICINE

## 2025-04-29 PROCEDURE — 2709999900 HC NON-CHARGEABLE SUPPLY: Performed by: INTERNAL MEDICINE

## 2025-04-29 PROCEDURE — 2700000000 HC OXYGEN THERAPY PER DAY

## 2025-04-29 PROCEDURE — 99151 MOD SED SAME PHYS/QHP <5 YRS: CPT | Performed by: INTERNAL MEDICINE

## 2025-04-29 PROCEDURE — B2111ZZ FLUOROSCOPY OF MULTIPLE CORONARY ARTERIES USING LOW OSMOLAR CONTRAST: ICD-10-PCS | Performed by: INTERNAL MEDICINE

## 2025-04-29 PROCEDURE — 36415 COLL VENOUS BLD VENIPUNCTURE: CPT

## 2025-04-29 PROCEDURE — 93458 L HRT ARTERY/VENTRICLE ANGIO: CPT | Performed by: INTERNAL MEDICINE

## 2025-04-29 PROCEDURE — C1892 INTRO/SHEATH,FIXED,PEEL-AWAY: HCPCS | Performed by: INTERNAL MEDICINE

## 2025-04-29 PROCEDURE — C1894 INTRO/SHEATH, NON-LASER: HCPCS | Performed by: INTERNAL MEDICINE

## 2025-04-29 PROCEDURE — 94660 CPAP INITIATION&MGMT: CPT

## 2025-04-29 PROCEDURE — C1887 CATHETER, GUIDING: HCPCS | Performed by: INTERNAL MEDICINE

## 2025-04-29 PROCEDURE — 99153 MOD SED SAME PHYS/QHP EA: CPT | Performed by: INTERNAL MEDICINE

## 2025-04-29 PROCEDURE — C1874 STENT, COATED/COV W/DEL SYS: HCPCS | Performed by: INTERNAL MEDICINE

## 2025-04-29 PROCEDURE — 2500000003 HC RX 250 WO HCPCS: Performed by: INTERNAL MEDICINE

## 2025-04-29 DEVICE — STENT CORONARY ONYX FRONTIER RX 2.5X15 MM ZOTAROLIMUS ELUT: Type: IMPLANTABLE DEVICE | Status: FUNCTIONAL

## 2025-04-29 DEVICE — STENT ONYXNG22512UX ONYX 2.25X12RX
Type: IMPLANTABLE DEVICE | Status: FUNCTIONAL
Brand: ONYX FRONTIER™

## 2025-04-29 DEVICE — ANGIO-SEAL VIP VASCULAR CLOSURE DEVICE
Type: IMPLANTABLE DEVICE | Status: FUNCTIONAL
Brand: ANGIO-SEAL

## 2025-04-29 DEVICE — STENT ONYXNG35022UX ONYX 3.50X22RX
Type: IMPLANTABLE DEVICE | Status: FUNCTIONAL
Brand: ONYX FRONTIER™

## 2025-04-29 RX ORDER — ATORVASTATIN CALCIUM 80 MG/1
80 TABLET, FILM COATED ORAL DAILY
Status: CANCELLED | OUTPATIENT
Start: 2025-04-30

## 2025-04-29 RX ORDER — ASPIRIN 81 MG/1
81 TABLET ORAL DAILY
Status: DISCONTINUED | OUTPATIENT
Start: 2025-04-30 | End: 2025-04-30 | Stop reason: HOSPADM

## 2025-04-29 RX ORDER — POLYETHYLENE GLYCOL 3350 17 G/17G
17 POWDER, FOR SOLUTION ORAL DAILY PRN
Status: DISCONTINUED | OUTPATIENT
Start: 2025-04-29 | End: 2025-04-30 | Stop reason: HOSPADM

## 2025-04-29 RX ORDER — ATORVASTATIN CALCIUM 80 MG/1
80 TABLET, FILM COATED ORAL DAILY
Status: DISCONTINUED | OUTPATIENT
Start: 2025-04-30 | End: 2025-04-29

## 2025-04-29 RX ORDER — ACETAMINOPHEN 650 MG/1
650 SUPPOSITORY RECTAL EVERY 6 HOURS PRN
Status: DISCONTINUED | OUTPATIENT
Start: 2025-04-29 | End: 2025-04-30 | Stop reason: HOSPADM

## 2025-04-29 RX ORDER — ASPIRIN 325 MG
TABLET ORAL PRN
Status: DISCONTINUED | OUTPATIENT
Start: 2025-04-29 | End: 2025-04-29 | Stop reason: HOSPADM

## 2025-04-29 RX ORDER — SODIUM CHLORIDE 0.9 % (FLUSH) 0.9 %
5-40 SYRINGE (ML) INJECTION EVERY 12 HOURS SCHEDULED
Status: DISCONTINUED | OUTPATIENT
Start: 2025-04-29 | End: 2025-04-30 | Stop reason: HOSPADM

## 2025-04-29 RX ORDER — INSULIN LISPRO 100 [IU]/ML
0-8 INJECTION, SOLUTION INTRAVENOUS; SUBCUTANEOUS
Status: DISCONTINUED | OUTPATIENT
Start: 2025-04-29 | End: 2025-04-30 | Stop reason: HOSPADM

## 2025-04-29 RX ORDER — ACETAMINOPHEN 325 MG/1
650 TABLET ORAL EVERY 6 HOURS PRN
Status: DISCONTINUED | OUTPATIENT
Start: 2025-04-29 | End: 2025-04-29 | Stop reason: SDUPTHER

## 2025-04-29 RX ORDER — DIPHENHYDRAMINE HYDROCHLORIDE 50 MG/ML
50 INJECTION, SOLUTION INTRAMUSCULAR; INTRAVENOUS ONCE
Status: COMPLETED | OUTPATIENT
Start: 2025-04-29 | End: 2025-04-29

## 2025-04-29 RX ORDER — POLYETHYLENE GLYCOL 3350 17 G/17G
17 POWDER, FOR SOLUTION ORAL DAILY PRN
Status: CANCELLED | OUTPATIENT
Start: 2025-04-29

## 2025-04-29 RX ORDER — ONDANSETRON 2 MG/ML
4 INJECTION INTRAMUSCULAR; INTRAVENOUS EVERY 6 HOURS PRN
Status: DISCONTINUED | OUTPATIENT
Start: 2025-04-29 | End: 2025-04-30 | Stop reason: HOSPADM

## 2025-04-29 RX ORDER — ENOXAPARIN SODIUM 100 MG/ML
40 INJECTION SUBCUTANEOUS DAILY
Status: DISCONTINUED | OUTPATIENT
Start: 2025-04-30 | End: 2025-04-30 | Stop reason: HOSPADM

## 2025-04-29 RX ORDER — SODIUM CHLORIDE AND POTASSIUM CHLORIDE 150; 900 MG/100ML; MG/100ML
INJECTION, SOLUTION INTRAVENOUS CONTINUOUS
Status: CANCELLED | OUTPATIENT
Start: 2025-04-29

## 2025-04-29 RX ORDER — MIDODRINE HYDROCHLORIDE 2.5 MG/1
2.5 TABLET ORAL 3 TIMES DAILY PRN
Status: DISCONTINUED | OUTPATIENT
Start: 2025-04-29 | End: 2025-04-30 | Stop reason: HOSPADM

## 2025-04-29 RX ORDER — MAGNESIUM SULFATE IN WATER 40 MG/ML
2000 INJECTION, SOLUTION INTRAVENOUS PRN
Status: DISCONTINUED | OUTPATIENT
Start: 2025-04-29 | End: 2025-04-30 | Stop reason: HOSPADM

## 2025-04-29 RX ORDER — RANOLAZINE 500 MG/1
500 TABLET, EXTENDED RELEASE ORAL 2 TIMES DAILY
Status: DISCONTINUED | OUTPATIENT
Start: 2025-04-29 | End: 2025-04-30 | Stop reason: HOSPADM

## 2025-04-29 RX ORDER — NITROGLYCERIN 20 MG/100ML
INJECTION INTRAVENOUS PRN
Status: DISCONTINUED | OUTPATIENT
Start: 2025-04-29 | End: 2025-04-29 | Stop reason: HOSPADM

## 2025-04-29 RX ORDER — SODIUM CHLORIDE 9 MG/ML
INJECTION, SOLUTION INTRAVENOUS PRN
Status: DISCONTINUED | OUTPATIENT
Start: 2025-04-29 | End: 2025-04-30 | Stop reason: HOSPADM

## 2025-04-29 RX ORDER — ONDANSETRON 4 MG/1
4 TABLET, ORALLY DISINTEGRATING ORAL EVERY 8 HOURS PRN
Status: DISCONTINUED | OUTPATIENT
Start: 2025-04-29 | End: 2025-04-30 | Stop reason: HOSPADM

## 2025-04-29 RX ORDER — BIVALIRUDIN 250 MG/5ML
INJECTION, POWDER, LYOPHILIZED, FOR SOLUTION INTRAVENOUS PRN
Status: DISCONTINUED | OUTPATIENT
Start: 2025-04-29 | End: 2025-04-29 | Stop reason: HOSPADM

## 2025-04-29 RX ORDER — SODIUM CHLORIDE 9 MG/ML
INJECTION, SOLUTION INTRAVENOUS CONTINUOUS
Status: DISCONTINUED | OUTPATIENT
Start: 2025-04-29 | End: 2025-04-29

## 2025-04-29 RX ORDER — ASPIRIN 81 MG/1
81 TABLET ORAL DAILY
Status: DISCONTINUED | OUTPATIENT
Start: 2025-04-30 | End: 2025-04-29

## 2025-04-29 RX ORDER — HEPARIN SODIUM 1000 [USP'U]/ML
2000 INJECTION, SOLUTION INTRAVENOUS; SUBCUTANEOUS PRN
Status: CANCELLED | OUTPATIENT
Start: 2025-04-29

## 2025-04-29 RX ORDER — SODIUM CHLORIDE 0.9 % (FLUSH) 0.9 %
5-40 SYRINGE (ML) INJECTION PRN
Status: CANCELLED | OUTPATIENT
Start: 2025-04-29

## 2025-04-29 RX ORDER — SODIUM CHLORIDE 9 MG/ML
INJECTION, SOLUTION INTRAVENOUS PRN
Status: DISCONTINUED | OUTPATIENT
Start: 2025-04-29 | End: 2025-04-29 | Stop reason: SDUPTHER

## 2025-04-29 RX ORDER — BUDESONIDE AND FORMOTEROL FUMARATE DIHYDRATE 160; 4.5 UG/1; UG/1
2 AEROSOL RESPIRATORY (INHALATION)
Status: CANCELLED | OUTPATIENT
Start: 2025-04-29

## 2025-04-29 RX ORDER — ACETAMINOPHEN 650 MG/1
650 SUPPOSITORY RECTAL EVERY 6 HOURS PRN
Status: CANCELLED | OUTPATIENT
Start: 2025-04-29

## 2025-04-29 RX ORDER — TOPIRAMATE 100 MG/1
100 TABLET, FILM COATED ORAL DAILY
Status: DISCONTINUED | OUTPATIENT
Start: 2025-04-29 | End: 2025-04-29

## 2025-04-29 RX ORDER — POTASSIUM CHLORIDE 7.45 MG/ML
10 INJECTION INTRAVENOUS PRN
Status: CANCELLED | OUTPATIENT
Start: 2025-04-29

## 2025-04-29 RX ORDER — DEXTROSE MONOHYDRATE 100 MG/ML
INJECTION, SOLUTION INTRAVENOUS CONTINUOUS PRN
Status: DISCONTINUED | OUTPATIENT
Start: 2025-04-29 | End: 2025-04-30 | Stop reason: HOSPADM

## 2025-04-29 RX ORDER — HEPARIN SODIUM 1000 [USP'U]/ML
2000 INJECTION, SOLUTION INTRAVENOUS; SUBCUTANEOUS PRN
Status: DISCONTINUED | OUTPATIENT
Start: 2025-04-29 | End: 2025-04-30 | Stop reason: HOSPADM

## 2025-04-29 RX ORDER — LIDOCAINE HYDROCHLORIDE 10 MG/ML
INJECTION, SOLUTION INFILTRATION; PERINEURAL PRN
Status: DISCONTINUED | OUTPATIENT
Start: 2025-04-29 | End: 2025-04-29 | Stop reason: HOSPADM

## 2025-04-29 RX ORDER — ACETAMINOPHEN 325 MG/1
650 TABLET ORAL EVERY 6 HOURS PRN
Status: CANCELLED | OUTPATIENT
Start: 2025-04-29

## 2025-04-29 RX ORDER — SODIUM CHLORIDE 0.9 % (FLUSH) 0.9 %
5-40 SYRINGE (ML) INJECTION PRN
Status: DISCONTINUED | OUTPATIENT
Start: 2025-04-29 | End: 2025-04-30 | Stop reason: HOSPADM

## 2025-04-29 RX ORDER — SODIUM CHLORIDE 9 MG/ML
INJECTION, SOLUTION INTRAVENOUS PRN
Status: CANCELLED | OUTPATIENT
Start: 2025-04-29

## 2025-04-29 RX ORDER — TOPIRAMATE 100 MG/1
100 TABLET, FILM COATED ORAL NIGHTLY
Status: CANCELLED | OUTPATIENT
Start: 2025-04-29

## 2025-04-29 RX ORDER — RANOLAZINE 500 MG/1
500 TABLET, EXTENDED RELEASE ORAL 2 TIMES DAILY
Status: CANCELLED | OUTPATIENT
Start: 2025-04-29

## 2025-04-29 RX ORDER — POTASSIUM CHLORIDE 1500 MG/1
40 TABLET, EXTENDED RELEASE ORAL PRN
Status: CANCELLED | OUTPATIENT
Start: 2025-04-29

## 2025-04-29 RX ORDER — POTASSIUM CHLORIDE 7.45 MG/ML
10 INJECTION INTRAVENOUS PRN
Status: DISCONTINUED | OUTPATIENT
Start: 2025-04-29 | End: 2025-04-30 | Stop reason: HOSPADM

## 2025-04-29 RX ORDER — PANTOPRAZOLE SODIUM 40 MG/1
40 TABLET, DELAYED RELEASE ORAL 2 TIMES DAILY
Status: DISCONTINUED | OUTPATIENT
Start: 2025-04-29 | End: 2025-04-29

## 2025-04-29 RX ORDER — ACETAMINOPHEN 650 MG/1
650 SUPPOSITORY RECTAL EVERY 6 HOURS PRN
Status: DISCONTINUED | OUTPATIENT
Start: 2025-04-29 | End: 2025-04-29

## 2025-04-29 RX ORDER — ONDANSETRON 4 MG/1
4 TABLET, ORALLY DISINTEGRATING ORAL EVERY 8 HOURS PRN
Status: CANCELLED | OUTPATIENT
Start: 2025-04-29

## 2025-04-29 RX ORDER — PANTOPRAZOLE SODIUM 40 MG/1
40 TABLET, DELAYED RELEASE ORAL 2 TIMES DAILY
Status: DISCONTINUED | OUTPATIENT
Start: 2025-04-29 | End: 2025-04-30 | Stop reason: HOSPADM

## 2025-04-29 RX ORDER — HEPARIN SODIUM 1000 [USP'U]/ML
4000 INJECTION, SOLUTION INTRAVENOUS; SUBCUTANEOUS PRN
Status: DISCONTINUED | OUTPATIENT
Start: 2025-04-29 | End: 2025-04-30 | Stop reason: HOSPADM

## 2025-04-29 RX ORDER — SODIUM CHLORIDE 0.9 % (FLUSH) 0.9 %
5-40 SYRINGE (ML) INJECTION PRN
Status: DISCONTINUED | OUTPATIENT
Start: 2025-04-29 | End: 2025-04-29 | Stop reason: SDUPTHER

## 2025-04-29 RX ORDER — DEXTROSE MONOHYDRATE 100 MG/ML
INJECTION, SOLUTION INTRAVENOUS CONTINUOUS PRN
Status: CANCELLED | OUTPATIENT
Start: 2025-04-29

## 2025-04-29 RX ORDER — ASPIRIN 81 MG/1
81 TABLET ORAL DAILY
Status: CANCELLED | OUTPATIENT
Start: 2025-04-30

## 2025-04-29 RX ORDER — TOPIRAMATE 100 MG/1
100 TABLET, FILM COATED ORAL NIGHTLY
Status: DISCONTINUED | OUTPATIENT
Start: 2025-04-29 | End: 2025-04-30 | Stop reason: HOSPADM

## 2025-04-29 RX ORDER — INSULIN GLARGINE 100 [IU]/ML
30 INJECTION, SOLUTION SUBCUTANEOUS 2 TIMES DAILY
Status: CANCELLED | OUTPATIENT
Start: 2025-04-29

## 2025-04-29 RX ORDER — PANTOPRAZOLE SODIUM 40 MG/1
40 TABLET, DELAYED RELEASE ORAL 2 TIMES DAILY
Status: CANCELLED | OUTPATIENT
Start: 2025-04-29

## 2025-04-29 RX ORDER — ATORVASTATIN CALCIUM 80 MG/1
80 TABLET, FILM COATED ORAL NIGHTLY
Status: DISCONTINUED | OUTPATIENT
Start: 2025-04-29 | End: 2025-04-30 | Stop reason: HOSPADM

## 2025-04-29 RX ORDER — ONDANSETRON 2 MG/ML
4 INJECTION INTRAMUSCULAR; INTRAVENOUS EVERY 6 HOURS PRN
Status: CANCELLED | OUTPATIENT
Start: 2025-04-29

## 2025-04-29 RX ORDER — SODIUM CHLORIDE 9 MG/ML
INJECTION, SOLUTION INTRAVENOUS CONTINUOUS
Status: DISCONTINUED | OUTPATIENT
Start: 2025-04-29 | End: 2025-04-30 | Stop reason: HOSPADM

## 2025-04-29 RX ORDER — DOCUSATE SODIUM 100 MG/1
100 CAPSULE, LIQUID FILLED ORAL 2 TIMES DAILY
Status: CANCELLED | OUTPATIENT
Start: 2025-04-29

## 2025-04-29 RX ORDER — METOPROLOL SUCCINATE 25 MG/1
25 TABLET, EXTENDED RELEASE ORAL DAILY
Status: DISCONTINUED | OUTPATIENT
Start: 2025-04-29 | End: 2025-04-30 | Stop reason: HOSPADM

## 2025-04-29 RX ORDER — DOCUSATE SODIUM 100 MG/1
100 CAPSULE, LIQUID FILLED ORAL 2 TIMES DAILY
Status: DISCONTINUED | OUTPATIENT
Start: 2025-04-29 | End: 2025-04-30 | Stop reason: HOSPADM

## 2025-04-29 RX ORDER — MIDAZOLAM 1 MG/ML
INJECTION INTRAMUSCULAR; INTRAVENOUS PRN
Status: DISCONTINUED | OUTPATIENT
Start: 2025-04-29 | End: 2025-04-29 | Stop reason: HOSPADM

## 2025-04-29 RX ORDER — GLUCAGON 1 MG/ML
1 KIT INJECTION PRN
Status: DISCONTINUED | OUTPATIENT
Start: 2025-04-29 | End: 2025-04-30 | Stop reason: HOSPADM

## 2025-04-29 RX ORDER — POTASSIUM CHLORIDE 1500 MG/1
40 TABLET, EXTENDED RELEASE ORAL PRN
Status: DISCONTINUED | OUTPATIENT
Start: 2025-04-29 | End: 2025-04-30 | Stop reason: HOSPADM

## 2025-04-29 RX ORDER — IOPAMIDOL 755 MG/ML
INJECTION, SOLUTION INTRAVASCULAR PRN
Status: DISCONTINUED | OUTPATIENT
Start: 2025-04-29 | End: 2025-04-29 | Stop reason: HOSPADM

## 2025-04-29 RX ORDER — SODIUM CHLORIDE 0.9 % (FLUSH) 0.9 %
10 SYRINGE (ML) INJECTION EVERY 12 HOURS SCHEDULED
Status: DISCONTINUED | OUTPATIENT
Start: 2025-04-29 | End: 2025-04-30 | Stop reason: HOSPADM

## 2025-04-29 RX ORDER — ACETAMINOPHEN 325 MG/1
650 TABLET ORAL EVERY 6 HOURS PRN
Status: DISCONTINUED | OUTPATIENT
Start: 2025-04-29 | End: 2025-04-30 | Stop reason: HOSPADM

## 2025-04-29 RX ORDER — BUDESONIDE AND FORMOTEROL FUMARATE DIHYDRATE 160; 4.5 UG/1; UG/1
2 AEROSOL RESPIRATORY (INHALATION)
Status: DISCONTINUED | OUTPATIENT
Start: 2025-04-29 | End: 2025-04-30 | Stop reason: HOSPADM

## 2025-04-29 RX ORDER — POLYETHYLENE GLYCOL 3350 17 G/17G
17 POWDER, FOR SOLUTION ORAL DAILY PRN
Status: DISCONTINUED | OUTPATIENT
Start: 2025-04-29 | End: 2025-04-29 | Stop reason: SDUPTHER

## 2025-04-29 RX ORDER — ALBUTEROL SULFATE 90 UG/1
2 INHALANT RESPIRATORY (INHALATION) 4 TIMES DAILY PRN
Status: CANCELLED | OUTPATIENT
Start: 2025-04-29

## 2025-04-29 RX ORDER — POTASSIUM CHLORIDE 1500 MG/1
40 TABLET, EXTENDED RELEASE ORAL PRN
Status: DISCONTINUED | OUTPATIENT
Start: 2025-04-29 | End: 2025-04-29 | Stop reason: SDUPTHER

## 2025-04-29 RX ORDER — ALBUTEROL SULFATE 90 UG/1
2 INHALANT RESPIRATORY (INHALATION) 4 TIMES DAILY PRN
Status: DISCONTINUED | OUTPATIENT
Start: 2025-04-29 | End: 2025-04-30 | Stop reason: HOSPADM

## 2025-04-29 RX ORDER — MIDODRINE HYDROCHLORIDE 2.5 MG/1
2.5 TABLET ORAL 3 TIMES DAILY PRN
Status: CANCELLED | OUTPATIENT
Start: 2025-04-29

## 2025-04-29 RX ORDER — SODIUM CHLORIDE 0.9 % (FLUSH) 0.9 %
10 SYRINGE (ML) INJECTION PRN
Status: DISCONTINUED | OUTPATIENT
Start: 2025-04-29 | End: 2025-04-30 | Stop reason: HOSPADM

## 2025-04-29 RX ORDER — MAGNESIUM SULFATE IN WATER 40 MG/ML
2000 INJECTION, SOLUTION INTRAVENOUS PRN
Status: DISCONTINUED | OUTPATIENT
Start: 2025-04-29 | End: 2025-04-29 | Stop reason: SDUPTHER

## 2025-04-29 RX ORDER — MAGNESIUM SULFATE HEPTAHYDRATE 40 MG/ML
2000 INJECTION, SOLUTION INTRAVENOUS PRN
Status: CANCELLED | OUTPATIENT
Start: 2025-04-29

## 2025-04-29 RX ORDER — INSULIN GLARGINE 100 [IU]/ML
30 INJECTION, SOLUTION SUBCUTANEOUS 2 TIMES DAILY
Status: DISCONTINUED | OUTPATIENT
Start: 2025-04-29 | End: 2025-04-30 | Stop reason: HOSPADM

## 2025-04-29 RX ORDER — HEPARIN SODIUM 1000 [USP'U]/ML
4000 INJECTION, SOLUTION INTRAVENOUS; SUBCUTANEOUS PRN
Status: CANCELLED | OUTPATIENT
Start: 2025-04-29

## 2025-04-29 RX ORDER — SODIUM CHLORIDE AND POTASSIUM CHLORIDE 150; 900 MG/100ML; MG/100ML
INJECTION, SOLUTION INTRAVENOUS CONTINUOUS
Status: DISCONTINUED | OUTPATIENT
Start: 2025-04-29 | End: 2025-04-29

## 2025-04-29 RX ORDER — SODIUM CHLORIDE 0.9 % (FLUSH) 0.9 %
5-40 SYRINGE (ML) INJECTION EVERY 12 HOURS SCHEDULED
Status: CANCELLED | OUTPATIENT
Start: 2025-04-29

## 2025-04-29 RX ORDER — OXYBUTYNIN CHLORIDE 5 MG/1
5 TABLET, EXTENDED RELEASE ORAL NIGHTLY
Status: DISCONTINUED | OUTPATIENT
Start: 2025-04-29 | End: 2025-04-30 | Stop reason: HOSPADM

## 2025-04-29 RX ORDER — ESCITALOPRAM OXALATE 10 MG/1
20 TABLET ORAL DAILY
Status: DISCONTINUED | OUTPATIENT
Start: 2025-04-30 | End: 2025-04-30 | Stop reason: HOSPADM

## 2025-04-29 RX ADMIN — ATORVASTATIN CALCIUM 80 MG: 80 TABLET, FILM COATED ORAL at 21:54

## 2025-04-29 RX ADMIN — RANOLAZINE 500 MG: 500 TABLET, FILM COATED, EXTENDED RELEASE ORAL at 21:54

## 2025-04-29 RX ADMIN — BUDESONIDE AND FORMOTEROL FUMARATE DIHYDRATE 2 PUFF: 160; 4.5 AEROSOL RESPIRATORY (INHALATION) at 20:53

## 2025-04-29 RX ADMIN — DIPHENHYDRAMINE HYDROCHLORIDE 50 MG: 50 INJECTION INTRAMUSCULAR; INTRAVENOUS at 10:19

## 2025-04-29 RX ADMIN — DOCUSATE SODIUM 100 MG: 100 CAPSULE, LIQUID FILLED ORAL at 21:53

## 2025-04-29 RX ADMIN — HEPARIN SODIUM 14 UNITS/KG/HR: 10000 INJECTION, SOLUTION INTRAVENOUS at 01:18

## 2025-04-29 RX ADMIN — PANTOPRAZOLE SODIUM 40 MG: 40 TABLET, DELAYED RELEASE ORAL at 21:53

## 2025-04-29 RX ADMIN — METHYLPREDNISOLONE SODIUM SUCCINATE 125 MG: 125 INJECTION INTRAMUSCULAR; INTRAVENOUS at 10:19

## 2025-04-29 RX ADMIN — SODIUM CHLORIDE, PRESERVATIVE FREE 10 ML: 5 INJECTION INTRAVENOUS at 21:54

## 2025-04-29 RX ADMIN — Medication 3 MG: at 21:59

## 2025-04-29 RX ADMIN — OXYBUTYNIN CHLORIDE 5 MG: 5 TABLET, EXTENDED RELEASE ORAL at 21:53

## 2025-04-29 RX ADMIN — BUDESONIDE AND FORMOTEROL FUMARATE DIHYDRATE 2 PUFF: 160; 4.5 AEROSOL RESPIRATORY (INHALATION) at 07:44

## 2025-04-29 RX ADMIN — INSULIN GLARGINE 30 UNITS: 100 INJECTION, SOLUTION SUBCUTANEOUS at 22:01

## 2025-04-29 RX ADMIN — SODIUM CHLORIDE AND POTASSIUM CHLORIDE: 9; 1.49 INJECTION, SOLUTION INTRAVENOUS at 00:06

## 2025-04-29 RX ADMIN — TICAGRELOR 90 MG: 90 TABLET ORAL at 21:54

## 2025-04-29 RX ADMIN — TOPIRAMATE 100 MG: 100 TABLET, FILM COATED ORAL at 21:53

## 2025-04-29 ASSESSMENT — PAIN SCALES - GENERAL
PAINLEVEL_OUTOF10: 0
PAINLEVEL_OUTOF10: 0

## 2025-04-29 NOTE — PROGRESS NOTES
Report and paperwork given to Transportation. All questions answered. IV was disconnected. All personal belongings were gathered. Patient was taken by stretcher without complications.

## 2025-04-29 NOTE — PROGRESS NOTES
Patient admitted, consent signed and questions answered. Patient ready for procedure. Call light to reach with side rails up 2 of 2. Bilateral groins clipped with writer and Aric present.  No FAMILY at bedside with patient.  History and physical UPDATED.  PATIENT transferred from [facility]. Patient alert , oriented , denies any pain on admission.

## 2025-04-29 NOTE — PROGRESS NOTES
Physical Therapy          Physical Therapy Cancel Note      DATE: 2025    NAME: Mariangel Abreu  MRN: [id number]   : 1950      Patient not seen this date for Physical Therapy due to:    Pt gone to [facility] for cardiac cath.      Electronically signed by Nabil Tate PT on 2025 at 9:59 AM

## 2025-04-29 NOTE — PROGRESS NOTES
Pt arrived from cath lab, fem site dressing saturated in bright red blood. Vitals stable. No hematoma at site. Femoral & pedal pulses both palpable

## 2025-04-29 NOTE — PLAN OF CARE
Problem: Chronic Conditions and Co-morbidities  Goal: Patient's chronic conditions and co-morbidity symptoms are monitored and maintained or improved  Outcome: Progressing     Problem: Discharge Planning  Goal: Discharge to home or other facility with appropriate resources  Outcome: Progressing     Problem: Pain  Goal: Verbalizes/displays adequate comfort level or baseline comfort level  Outcome: Progressing     Problem: Skin/Tissue Integrity  Goal: Skin integrity remains intact  Description: 1.  Monitor for areas of redness and/or skin breakdown2.  Assess vascular access sites hourly3.  Every 4-6 hours minimum:  Change oxygen saturation probe site4.  Every 4-6 hours:  If on nasal continuous positive airway pressure, respiratory therapy assess nares and determine need for appliance change or resting period  Outcome: Progressing  Flowsheets (Taken 4/28/2025 2115)  Skin Integrity Remains Intact:   Monitor for areas of redness and/or skin breakdown   Assess vascular access sites hourly     Problem: Safety - Adult  Goal: Free from fall injury  Outcome: Progressing  Flowsheets (Taken 4/28/2025 2115)  Free From Fall Injury: Instruct family/caregiver on patient safety     Problem: Respiratory - Adult  Goal: Achieves optimal ventilation and oxygenation  Outcome: Progressing     Problem: ABCDS Injury Assessment  Goal: Absence of physical injury  Outcome: Progressing  Flowsheets (Taken 4/28/2025 2115)  Absence of Physical Injury: Implement safety measures based on patient assessment

## 2025-04-29 NOTE — PROGRESS NOTES
04/28/25 1416   Encounter Summary   Encounter Overview/Reason Volunteer Encounter   Last Encounter  04/28/25   Assessment/Intervention/Outcome   Intervention Sustaining Presence/Ministry of presence        No...

## 2025-04-29 NOTE — INTERVAL H&P NOTE
Update History & Physical    The patient's History and Physical of April 28, 2025 was reviewed with the patient and I examined the patient. There was no change. The surgical site was confirmed by the patient and me.     Plan: The risks, benefits, expected outcome, and alternative to the recommended procedure have been discussed with the patient. Patient understands and wants to proceed with the procedure.     Electronically signed by Jose Neal MD on 4/29/2025 at 1:50 PM

## 2025-04-29 NOTE — DISCHARGE SUMMARY
daily as needed for Pain for up to 30 days. Intended supply: 30 days. Max Daily Amount: 2 tablets 4/9/25 5/9/25 Yes Karen Cardoso APRN - CNP   nitroGLYCERIN (NITROSTAT) 0.4 MG SL tablet Place 1 tablet under the tongue every 5 minutes as needed for Chest pain up to max of 3 total doses. If no relief after 1 dose, call 911. 4/1/25  Yes Rose Buckley MD   pantoprazole (PROTONIX) 40 MG tablet Take 1 tablet by mouth in the morning and at bedtime   Yes ProviderNick MD   dapagliflozin (FARXIGA) 5 MG tablet Take 1 tablet by mouth every morning   Yes Provider, MD Nick   metoprolol tartrate (LOPRESSOR) 25 MG tablet Take 0.5 tablets by mouth 2 times daily 2/19/25  Yes Rose Buckley MD   insulin glargine (LANTUS SOLOSTAR) 100 UNIT/ML injection pen INJECT 50 UNITS INTO THE SKIN EVERY MORNING AND 45 UNITS NIGHTLY.  Patient taking differently: INJECT 50 UNITS INTO THE SKIN EVERY MORNING AND 50 UNITS NIGHTLY. 2/11/25 2/11/26 Yes Carla Chua APRN - CNP   budesonide-formoterol (SYMBICORT) 160-4.5 MCG/ACT AERO INHALE 2 PUFFS INTO THE LUNGS 2 TIMES DAILY AS NEEDED FOR FOR SHORTNESS OF BREATH 11/26/24 11/26/25 Yes Carla Chua APRN - CNP   insulin aspart (NOVOLOG FLEXPEN) 100 UNIT/ML injection pen 5 units with each meal plus IF<139 NO INSULIN; 140-199-4 UN;200-249-8 UN;250-299-10 UN;300-349-12 UN;350-400=16 UN;ABOVE 400: 20 UNIT(S)  Patient taking differently: Inject 5-17 Units into the skin 3 times daily (before meals) 5 units with each meal plus IF<139 NO INSULIN; 140-199=2 units ;200-249= 4 units ;250-299= 6 units  ;300-349= 8 units  ;350-400=10 units ;ABOVE 400 = 12 units 11/13/24  Yes Carla Chua APRN - CNP   gabapentin (NEURONTIN) 100 MG capsule Take 1 capsule by mouth 2 times daily for 30 days. 11/3/24 4/25/25 Yes Genia Ramos MD   ranolazine (RANEXA) 500 MG extended release tablet Take 1 tablet by mouth 2 times daily 10/7/24  Yes Riley Fox MD   isosorbide mononitrate  Continue CPAP.  DM type II.  On Lantus 50 units twice daily at home, sugar levels much controlled, resume Lantus at a lower dose.  Carb restricted diet.,  Controlled  Morbid obesity.  DVT prophylaxis.  Lovenox.  Chronic back pain follows with pain management on opiates  Cath on 4/29  Transferred to [facility]     Consultations:   IP CONSULT TO CARDIOLOGY  IP CONSULT TO INTERNAL MEDICINE  IP CONSULT TO CARDIOLOGY  IP CONSULT TO CASE MANAGEMENT  IP CONSULT TO RESPIRATORY CARE  IP CONSULT TO CARDIOLOGY  IP CONSULT TO INTERNAL MEDICINE  IP CONSULT TO CARDIOLOGY  IP CONSULT TO CASE MANAGEMENT  IP CONSULT TO RESPIRATORY CARE    Patient is admitted as inpatient status because of co-morbidities listed above, severity of signs and symptoms as outlined, requirement for current medical therapies and most importantly because of direct risk to patient if care not provided in a hospital setting.  Expected length of stay > 48 hours.    Rose Buckley MD  4/29/2025  9:33 AM    Copy sent to Nuzhat Meehan, DTR    Please note that this chart was generated using voice recognition Dragon dictation software.  Although every effort was made to ensure the accuracy of this automated transcription, some errors in transcription may have occurred.

## 2025-04-30 VITALS
SYSTOLIC BLOOD PRESSURE: 119 MMHG | HEIGHT: 62 IN | RESPIRATION RATE: 16 BRPM | OXYGEN SATURATION: 95 % | HEART RATE: 83 BPM | TEMPERATURE: 98.1 F | DIASTOLIC BLOOD PRESSURE: 47 MMHG | WEIGHT: 234.13 LBS | BODY MASS INDEX: 43.08 KG/M2

## 2025-04-30 LAB
ANION GAP SERPL CALCULATED.3IONS-SCNC: 14 MMOL/L (ref 9–16)
BASOPHILS # BLD: 0.04 K/UL (ref 0–0.2)
BASOPHILS NFR BLD: 0 % (ref 0–2)
BUN SERPL-MCNC: 23 MG/DL (ref 8–23)
CALCIUM SERPL-MCNC: 9.3 MG/DL (ref 8.6–10.4)
CHLORIDE SERPL-SCNC: 102 MMOL/L (ref 98–107)
CO2 SERPL-SCNC: 18 MMOL/L (ref 20–31)
CREAT SERPL-MCNC: 1.2 MG/DL (ref 0.6–0.9)
EOSINOPHIL # BLD: <0.03 K/UL (ref 0–0.44)
EOSINOPHILS RELATIVE PERCENT: 0 % (ref 1–4)
ERYTHROCYTE [DISTWIDTH] IN BLOOD BY AUTOMATED COUNT: 14.9 % (ref 11.8–14.4)
GFR, ESTIMATED: 47 ML/MIN/1.73M2
GLUCOSE BLD-MCNC: 204 MG/DL (ref 65–105)
GLUCOSE BLD-MCNC: 211 MG/DL (ref 65–105)
GLUCOSE SERPL-MCNC: 193 MG/DL (ref 74–99)
HCT VFR BLD AUTO: 39.4 % (ref 36.3–47.1)
HGB BLD-MCNC: 12.3 G/DL (ref 11.9–15.1)
IMM GRANULOCYTES # BLD AUTO: 0.07 K/UL (ref 0–0.3)
IMM GRANULOCYTES NFR BLD: 1 %
LYMPHOCYTES NFR BLD: 1.28 K/UL (ref 1.1–3.7)
LYMPHOCYTES RELATIVE PERCENT: 13 % (ref 24–43)
MCH RBC QN AUTO: 29.1 PG (ref 25.2–33.5)
MCHC RBC AUTO-ENTMCNC: 31.2 G/DL (ref 28.4–34.8)
MCV RBC AUTO: 93.1 FL (ref 82.6–102.9)
MONOCYTES NFR BLD: 0.79 K/UL (ref 0.1–1.2)
MONOCYTES NFR BLD: 8 % (ref 3–12)
NEUTROPHILS NFR BLD: 78 % (ref 36–65)
NEUTS SEG NFR BLD: 7.51 K/UL (ref 1.5–8.1)
NRBC BLD-RTO: 0 PER 100 WBC
PLATELET # BLD AUTO: 218 K/UL (ref 138–453)
PMV BLD AUTO: 11.4 FL (ref 8.1–13.5)
POTASSIUM SERPL-SCNC: 4.2 MMOL/L (ref 3.7–5.3)
RBC # BLD AUTO: 4.23 M/UL (ref 3.95–5.11)
RBC # BLD: ABNORMAL 10*6/UL
SODIUM SERPL-SCNC: 134 MMOL/L (ref 136–145)
WBC OTHER # BLD: 9.7 K/UL (ref 3.5–11.3)

## 2025-04-30 PROCEDURE — 99239 HOSP IP/OBS DSCHRG MGMT >30: CPT | Performed by: NURSE PRACTITIONER

## 2025-04-30 PROCEDURE — 80048 BASIC METABOLIC PNL TOTAL CA: CPT

## 2025-04-30 PROCEDURE — 2580000003 HC RX 258: Performed by: STUDENT IN AN ORGANIZED HEALTH CARE EDUCATION/TRAINING PROGRAM

## 2025-04-30 PROCEDURE — 6370000000 HC RX 637 (ALT 250 FOR IP): Performed by: INTERNAL MEDICINE

## 2025-04-30 PROCEDURE — 36415 COLL VENOUS BLD VENIPUNCTURE: CPT

## 2025-04-30 PROCEDURE — 94761 N-INVAS EAR/PLS OXIMETRY MLT: CPT

## 2025-04-30 PROCEDURE — 82947 ASSAY GLUCOSE BLOOD QUANT: CPT

## 2025-04-30 PROCEDURE — 6370000000 HC RX 637 (ALT 250 FOR IP): Performed by: STUDENT IN AN ORGANIZED HEALTH CARE EDUCATION/TRAINING PROGRAM

## 2025-04-30 PROCEDURE — 6360000002 HC RX W HCPCS: Performed by: STUDENT IN AN ORGANIZED HEALTH CARE EDUCATION/TRAINING PROGRAM

## 2025-04-30 PROCEDURE — 85025 COMPLETE CBC W/AUTO DIFF WBC: CPT

## 2025-04-30 PROCEDURE — 94640 AIRWAY INHALATION TREATMENT: CPT

## 2025-04-30 RX ADMIN — BUDESONIDE AND FORMOTEROL FUMARATE DIHYDRATE 2 PUFF: 160; 4.5 AEROSOL RESPIRATORY (INHALATION) at 12:03

## 2025-04-30 RX ADMIN — INSULIN LISPRO 2 UNITS: 100 INJECTION, SOLUTION INTRAVENOUS; SUBCUTANEOUS at 08:30

## 2025-04-30 RX ADMIN — ACETAMINOPHEN 650 MG: 325 TABLET ORAL at 07:48

## 2025-04-30 RX ADMIN — RANOLAZINE 500 MG: 500 TABLET, FILM COATED, EXTENDED RELEASE ORAL at 07:45

## 2025-04-30 RX ADMIN — METOPROLOL SUCCINATE 25 MG: 25 TABLET, EXTENDED RELEASE ORAL at 07:45

## 2025-04-30 RX ADMIN — PANTOPRAZOLE SODIUM 40 MG: 40 TABLET, DELAYED RELEASE ORAL at 07:45

## 2025-04-30 RX ADMIN — DOCUSATE SODIUM 100 MG: 100 CAPSULE, LIQUID FILLED ORAL at 07:45

## 2025-04-30 RX ADMIN — ESCITALOPRAM OXALATE 20 MG: 10 TABLET ORAL at 07:45

## 2025-04-30 RX ADMIN — ASPIRIN 81 MG: 81 TABLET, COATED ORAL at 07:44

## 2025-04-30 RX ADMIN — ENOXAPARIN SODIUM 40 MG: 100 INJECTION SUBCUTANEOUS at 08:01

## 2025-04-30 RX ADMIN — TICAGRELOR 90 MG: 90 TABLET ORAL at 07:45

## 2025-04-30 RX ADMIN — INSULIN LISPRO 2 UNITS: 100 INJECTION, SOLUTION INTRAVENOUS; SUBCUTANEOUS at 17:06

## 2025-04-30 RX ADMIN — SODIUM CHLORIDE: 0.9 INJECTION, SOLUTION INTRAVENOUS at 04:16

## 2025-04-30 RX ADMIN — INSULIN GLARGINE 30 UNITS: 100 INJECTION, SOLUTION SUBCUTANEOUS at 08:30

## 2025-04-30 ASSESSMENT — PAIN SCALES - GENERAL
PAINLEVEL_OUTOF10: 0
PAINLEVEL_OUTOF10: 9

## 2025-04-30 NOTE — DISCHARGE INSTR - DIET

## 2025-04-30 NOTE — PROGRESS NOTES
04/30/25 1203   Care Plan - Respiratory Goals   Achieves optimal ventilation and oxygenation Assess for changes in respiratory status;Assess for changes in mentation and behavior;Oxygen supplementation based on oxygen saturation or arterial blood gases;Position to facilitate oxygenation and minimize respiratory effort;Encourage broncho-pulmonary hygiene including cough, deep breathe, incentive spirometry;Assess the need for suctioning and aspirate as needed;Assess and instruct to report shortness of breath or any respiratory difficulty;Respiratory therapy support as indicated

## 2025-04-30 NOTE — PROGRESS NOTES
Writer went through discharge paperwork. All questions answered at this time, awaiting granddaughter arrival to be discharged.

## 2025-04-30 NOTE — PLAN OF CARE
Problem: Chronic Conditions and Co-morbidities  Goal: Patient's chronic conditions and co-morbidity symptoms are monitored and maintained or improved  Outcome: Progressing  Flowsheets (Taken 4/29/2025 2000)  Care Plan - Patient's Chronic Conditions and Co-Morbidity Symptoms are Monitored and Maintained or Improved:   Monitor and assess patient's chronic conditions and comorbid symptoms for stability, deterioration, or improvement   Collaborate with multidisciplinary team to address chronic and comorbid conditions and prevent exacerbation or deterioration   Update acute care plan with appropriate goals if chronic or comorbid symptoms are exacerbated and prevent overall improvement and discharge     Problem: Discharge Planning  Goal: Discharge to home or other facility with appropriate resources  Outcome: Progressing  Flowsheets (Taken 4/29/2025 2000)  Discharge to home or other facility with appropriate resources:   Identify barriers to discharge with patient and caregiver   Identify discharge learning needs (meds, wound care, etc)     Problem: Skin/Tissue Integrity  Goal: Skin integrity remains intact  Description: 1.  Monitor for areas of redness and/or skin breakdown2.  Assess vascular access sites hourly3.  Every 4-6 hours minimum:  Change oxygen saturation probe site4.  Every 4-6 hours:  If on nasal continuous positive airway pressure, respiratory therapy assess nares and determine need for appliance change or resting period  Outcome: Progressing  Flowsheets (Taken 4/29/2025 2000)  Skin Integrity Remains Intact:   Monitor for areas of redness and/or skin breakdown   Assess vascular access sites hourly   Every 4-6 hours minimum:  Change oxygen saturation probe site   Turn and reposition as indicated   Check visual cues for pain   Monitor skin under medical devices     Problem: Safety - Adult  Goal: Free from fall injury  Outcome: Progressing     Problem: ABCDS Injury Assessment  Goal: Absence of physical 
hourly   Every 4-6 hours minimum:  Change oxygen saturation probe site   Turn and reposition as indicated   Check visual cues for pain   Monitor skin under medical devices     Problem: Safety - Adult  Goal: Free from fall injury  4/30/2025 1850 by Janie Peng RN  Outcome: Completed  4/30/2025 0515 by Anne Dee RN  Outcome: Progressing  Flowsheets (Taken 4/30/2025 0500)  Free From Fall Injury: Instruct family/caregiver on patient safety     Problem: ABCDS Injury Assessment  Goal: Absence of physical injury  4/30/2025 1850 by Janie Peng RN  Outcome: Completed  4/30/2025 0515 by Anne Dee RN  Outcome: Progressing  Flowsheets (Taken 4/30/2025 0500)  Absence of Physical Injury: Implement safety measures based on patient assessment     Problem: Pain  Goal: Verbalizes/displays adequate comfort level or baseline comfort level  4/30/2025 1850 by Janie Peng RN  Outcome: Completed  4/30/2025 0515 by Anne Dee RN  Outcome: Progressing  Flowsheets (Taken 4/29/2025 2000)  Verbalizes/displays adequate comfort level or baseline comfort level:   Encourage patient to monitor pain and request assistance   Assess pain using appropriate pain scale   Administer analgesics based on type and severity of pain and evaluate response   Implement non-pharmacological measures as appropriate and evaluate response   Consider cultural and social influences on pain and pain management   Notify Licensed Independent Practitioner if interventions unsuccessful or patient reports new pain

## 2025-04-30 NOTE — DISCHARGE INSTR - COC
Medical Equipment (for information only, NOT a DME order):  wheelchair and walker  Other Treatments: skilled nursing, home health aide    Patient's personal belongings (please select all that are sent with patient):  Pb    RN SIGNATURE:  Electronically signed by REUBEN Lima on 4/30/25 at 12:12 PM EDT    CASE MANAGEMENT/SOCIAL WORK SECTION    Inpatient Status Date: ***    Readmission Risk Assessment Score:  [facility] RISK OF UNPLANNED READMISSION 2.0             32.9 Total Score        Discharging to Facility/ Agency   Name: [facility]    / signature: Electronically signed by Quita Loya RN on 4/30/25 at 11:51 AM EDT    PHYSICIAN SECTION    Prognosis: Fair    Condition at Discharge: Stable    Rehab Potential (if transferring to Rehab): Fair    Recommended Labs or Other Treatments After Discharge: see AVS    Physician Certification: I certify the above information and transfer of Mariangel Abreu  is necessary for the continuing treatment of the diagnosis listed and that she requires Home Care for less 30 days.     Update Admission H&P: No change in H&P    PHYSICIAN SIGNATURE:  Electronically signed by VERONIKA Villanueva CNP on 4/30/25 at 11:58 AM EDT

## 2025-04-30 NOTE — DISCHARGE SUMMARY
[facility]  Discharge Note                 Name:  Mariangel Abreu  YOB: 1950  Social Security Number:  [id number]  Medical Record Number:  [id number]    Date of Admission:  4/29/2025  Date of Discharge:  4/30/2025    Admitting physician: Jackie Hayes MD    Discharge Attending: VERONIKA Villanueva CNP, CNP  Primary Care Physician: Nuzhat Ramos DTR  Consultants: Cardiology  Discharge to home in stable condition    HOSPITAL ADMISSION PROBLEM LIST:  Patient Active Problem List   Diagnosis    Chronic pain of left knee    Type 2 diabetes mellitus with hyperosmolar hyperglycemic state (HHS) ([facility])    COPD (chronic obstructive pulmonary disease)    Nonintractable migraine, unspecified migraine type    Coronary artery calcification of native artery    DDD (degenerative disc disease), lumbar    DDD (degenerative disc disease), cervical    GERD (gastroesophageal reflux disease)    Essential hypertension    Mixed hyperlipidemia    Insomnia    Lumbosacral spondylosis without myelopathy    Sacroiliitis, not elsewhere classified    Carpal tunnel syndrome    Mixed stress and urge urinary incontinence    Intractable migraine with aura without status migrainosus    Anxiety and depression    Chronic migraine without aura without status migrainosus, not intractable    Morbid (severe) obesity with alveolar hypoventilation (HCC)    Chronic nausea    Lung nodule    Neuropathy    YAJAIRA (acute kidney injury)    Obstructive sleep apnea syndrome    Asthma with COPD ([facility])    Obesity, Class III, BMI 40-49.9 (morbid obesity) (HCC)    Stage 3 chronic kidney disease (HCC)    Benign hypertension with CKD (chronic kidney disease) stage III (HCC)    Secondary diabetes mellitus with stage 3 chronic kidney disease (GFR 30-59) (HCC)    CKD (chronic kidney disease) stage 3, GFR 30-59 ml/min    Lumbar radiculopathy, chronic    Sessile colonic polyp    Morbid obesity (HCC)    Adenomatous polyp of ascending

## 2025-04-30 NOTE — CARE COORDINATION
Case Management Assessment  Initial Evaluation    Date/Time of Evaluation: 4/30/2025 11:48 AM  Assessment Completed by: Quita Loya RN    If patient is discharged prior to next notation, then this note serves as note for discharge by case management.    Patient Name: Mariangel Abreu                   YOB: 1950  Diagnosis: Angina pectoris [I20.9]  S/P angioplasty with stent [Z95.820]  Unstable angina (HCC) [I20.0]                   Date / Time: 4/29/2025  9:50 AM    Patient Admission Status: Inpatient   Readmission Risk (Low < 19, Mod (19-27), High > 27): Readmission Risk Score: 32.9    Current PCP: Nuzhat Ramos DTR  PCP verified by CM? (P) Yes    Chart Reviewed: Yes      History Provided by: (P) Patient  Patient Orientation: (P) Alert and Oriented    Patient Cognition: (P) Alert    Hospitalization in the last 30 days (Readmission):  Yes    If yes, Readmission Assessment in CM Navigator will be completed.    Advance Directives:      Code Status: Full Code   Patient's Primary Decision Maker is: (P) Legal Next of Kin    Primary Decision Maker (Active): David Waller - Other Relative - [phone]    Discharge Planning:    Patient lives with: (P) Alone Type of Home: (P) Apartment  Primary Care Giver: (P) Self  Patient Support Systems include: (P) Children, Family Members   Current Financial resources: (P) Medicare, Medicaid  Current community resources:    Current services prior to admission: (P) Durable Medical Equipment, Home Care            Current DME: (P) Cane, Cpap, Glucometer, Home Aerosol, Oxygen Therapy (Comment), Shower Chair, Walker            Type of Home Care services:  (P) Aide Services, OT, PT, Nursing Services    ADLS  Prior functional level: (P) Independent in ADLs/IADLs  Current functional level: (P) Independent in ADLs/IADLs    PT AM-PAC:   /24  OT AM-PAC:   /24    Family can provide assistance at DC: (P) Yes  Would you like Case Management to discuss the discharge plan with

## 2025-05-05 RX ORDER — ISOSORBIDE MONONITRATE 30 MG/1
30 TABLET, EXTENDED RELEASE ORAL DAILY
Qty: 90 TABLET | Refills: 0 | OUTPATIENT
Start: 2025-05-05

## 2025-05-05 NOTE — PROGRESS NOTES
Physician Progress Note      PATIENT:               NO BARLOW  CSN #:                  [id number]  :                       1950  ADMIT DATE:       2025 12:17 PM  DISCH DATE:        2025 9:30 AM  RESPONDING  PROVIDER #:        Rose Buckley MD          QUERY TEXT:    Chest pain is documented in the medical record  H&P. Please specify the   underlying cause:    The clinical indicators include:  73yo, female, CAD with PCI, htn, copd, morbid obesity, stemi hx     DC Summary \"74-year-old female admitted for concerns of atypical chest   pain.unstable angina, plan is staged PCI for CAD. CAD with history of STEMI   2024 s/p PCI.  Continue aspirin, Lipitor.  Brilinta.,  Ranexa,, evaluated by   cardiologist, started on heparin drip, plan noted for cardiac cath\". Patient   was transferred for cardiac cath.  Labs 12, 13, 22  EKG: Sinus rhythm with Premature supraventricular complexes, Inferior infarct,   age undetermined, Abnormal ECG    labs, imaging, tele, EKG, cardiology consult, transfer for cardiac cath  Options provided:  -- Chest pain related to CAD with unstable angina  -- Chest pain related to, Please specify cause  -- Other - I will add my own diagnosis  -- Disagree - Not applicable / Not valid  -- Disagree - Clinically unable to determine / Unknown  -- Refer to Clinical Documentation Reviewer    PROVIDER RESPONSE TEXT:    This patient has chest pain related to CAD with unstable angina.    Query created by: Lara Cruz on 2025 2:29 PM      Electronically signed by:  Rose Buckley MD 2025 9:44 PM

## 2025-05-06 RX ORDER — AMLODIPINE BESYLATE 2.5 MG/1
2.5 TABLET ORAL DAILY
Qty: 84 TABLET | Refills: 0 | OUTPATIENT
Start: 2025-05-06

## 2025-05-07 ENCOUNTER — HOSPITAL ENCOUNTER (OUTPATIENT)
Dept: PAIN MANAGEMENT | Age: 75
Discharge: HOME | End: 2025-05-07
Payer: MEDICARE

## 2025-05-07 VITALS — WEIGHT: 234 LBS | HEIGHT: 62 IN | BODY MASS INDEX: 43.06 KG/M2

## 2025-05-07 DIAGNOSIS — M54.16 LUMBAR RADICULOPATHY, CHRONIC: Primary | ICD-10-CM

## 2025-05-07 DIAGNOSIS — Z79.891 CHRONIC USE OF OPIATE FOR THERAPEUTIC PURPOSE: ICD-10-CM

## 2025-05-07 PROCEDURE — 99213 OFFICE O/P EST LOW 20 MIN: CPT | Performed by: NURSE PRACTITIONER

## 2025-05-07 PROCEDURE — 99213 OFFICE O/P EST LOW 20 MIN: CPT

## 2025-05-07 ASSESSMENT — ENCOUNTER SYMPTOMS
BACK PAIN: 1
CONSTIPATION: 0
BOWEL INCONTINENCE: 0
SHORTNESS OF BREATH: 0
COUGH: 0

## 2025-05-07 ASSESSMENT — PAIN SCALES - GENERAL: PAINLEVEL_OUTOF10: 9

## 2025-05-07 NOTE — PROGRESS NOTES
Chief Complaint   Patient presents with    Back Pain    Medication Refill     Percocet  due 5/9/25         PMH     Hx of atrial fibrillation, CKD stage III, CHF, COPD, DM CVA MI with 4 stents and on brilinta  chronic hypoxic respiratory failure and HIGINIO on CPAP - has narcan 4/16/2024     Pt c/o low back pain that radiates down legs. No known injury or surgery to the area with onset more than 1 year ago and has progressively worsened  MRI 10- with multilevel degenerative changes and Right paracentral disc extrusion L4-5 narrowing the right lateral recess.   She is not interested in seeing NS for opinion and declines to update imaging even though reporting worsening leg weakness and falls.    Patient states that she had a bad experience with an injection in the past and is not interested in any interventional pain procedures      Recent hospital admission for chest pain  CT lumbar and cervical spine completed in ER in Feb with multilevel degenerative changes.   On home O2 and states she is using it more often    Back Pain  This is a chronic problem. The current episode started more than 1 year ago. The problem occurs constantly. The problem is unchanged. The pain is present in the lumbar spine. The quality of the pain is described as aching and burning. The pain radiates to the right thigh, right knee, right foot, left thigh, left knee and left foot. The pain is at a severity of 9/10. The pain is severe. The pain is Worse during the night. The symptoms are aggravated by bending, position, sitting, twisting and standing. Stiffness is present In the morning. Associated symptoms include leg pain. Pertinent negatives include no bladder incontinence, bowel incontinence, chest pain or fever. She has tried heat, ice and analgesics for the symptoms.       Patient denies any new neurological symptoms. No bowel or bladder incontinence, no weakness, and no falling.    Pill count: Empty bottle with patient  NOT

## 2025-05-29 ENCOUNTER — OFFICE VISIT (OUTPATIENT)
Dept: OBGYN CLINIC | Age: 75
End: 2025-05-29
Payer: MEDICARE

## 2025-05-29 ENCOUNTER — HOSPITAL ENCOUNTER (OUTPATIENT)
Age: 75
Setting detail: SPECIMEN
Discharge: HOME | End: 2025-05-29

## 2025-05-29 VITALS
BODY MASS INDEX: 40.48 KG/M2 | DIASTOLIC BLOOD PRESSURE: 78 MMHG | WEIGHT: 220 LBS | HEIGHT: 62 IN | SYSTOLIC BLOOD PRESSURE: 120 MMHG | HEART RATE: 82 BPM

## 2025-05-29 DIAGNOSIS — N89.8 VAGINAL ITCHING: ICD-10-CM

## 2025-05-29 DIAGNOSIS — N95.0 POST-MENOPAUSAL BLEEDING: Primary | ICD-10-CM

## 2025-05-29 DIAGNOSIS — Z01.419 CERVICAL SMEAR, AS PART OF ROUTINE GYNECOLOGICAL EXAMINATION: ICD-10-CM

## 2025-05-29 DIAGNOSIS — Z12.31 SCREENING MAMMOGRAM FOR BREAST CANCER: ICD-10-CM

## 2025-05-29 LAB
CANDIDA SPECIES: POSITIVE
GARDNERELLA VAGINALIS: NEGATIVE
SOURCE: ABNORMAL
TRICHOMONAS: NEGATIVE

## 2025-05-29 PROCEDURE — 1123F ACP DISCUSS/DSCN MKR DOCD: CPT | Performed by: CLINICAL NURSE SPECIALIST

## 2025-05-29 PROCEDURE — 3078F DIAST BP <80 MM HG: CPT | Performed by: CLINICAL NURSE SPECIALIST

## 2025-05-29 PROCEDURE — 1090F PRES/ABSN URINE INCON ASSESS: CPT | Performed by: CLINICAL NURSE SPECIALIST

## 2025-05-29 PROCEDURE — 1159F MED LIST DOCD IN RCRD: CPT | Performed by: CLINICAL NURSE SPECIALIST

## 2025-05-29 PROCEDURE — 1036F TOBACCO NON-USER: CPT | Performed by: CLINICAL NURSE SPECIALIST

## 2025-05-29 PROCEDURE — 1160F RVW MEDS BY RX/DR IN RCRD: CPT | Performed by: CLINICAL NURSE SPECIALIST

## 2025-05-29 PROCEDURE — 3017F COLORECTAL CA SCREEN DOC REV: CPT | Performed by: CLINICAL NURSE SPECIALIST

## 2025-05-29 PROCEDURE — G8417 CALC BMI ABV UP PARAM F/U: HCPCS | Performed by: CLINICAL NURSE SPECIALIST

## 2025-05-29 PROCEDURE — G8427 DOCREV CUR MEDS BY ELIG CLIN: HCPCS | Performed by: CLINICAL NURSE SPECIALIST

## 2025-05-29 PROCEDURE — 1111F DSCHRG MED/CURRENT MED MERGE: CPT | Performed by: CLINICAL NURSE SPECIALIST

## 2025-05-29 PROCEDURE — 3074F SYST BP LT 130 MM HG: CPT | Performed by: CLINICAL NURSE SPECIALIST

## 2025-05-29 PROCEDURE — G8399 PT W/DXA RESULTS DOCUMENT: HCPCS | Performed by: CLINICAL NURSE SPECIALIST

## 2025-05-29 PROCEDURE — 99213 OFFICE O/P EST LOW 20 MIN: CPT | Performed by: CLINICAL NURSE SPECIALIST

## 2025-05-29 ASSESSMENT — ENCOUNTER SYMPTOMS
ALLERGIC/IMMUNOLOGIC NEGATIVE: 1
GASTROINTESTINAL NEGATIVE: 1
RESPIRATORY NEGATIVE: 1
EYES NEGATIVE: 1

## 2025-05-29 NOTE — PROGRESS NOTES
Subjective:      Patient ID:  Mariangel Abreu is a 74 y.o. female who presents for   Chief Complaint   Patient presents with    Annual Exam       HPI    Patient is a 73 yo female who presents for postmenopausal bleeding described as intermittent that began approx. 2 months ago.  Patient reports that sometimes it comes down on a pad and sometimes it is just when she wipes, described as light pink.  Patient reports that she does have itching and the bleeding could be from her scratching.  Patient states that the bleeding is getting to be more and more since it first began.     Review of Systems   Constitutional:  Negative for chills and fever.   HENT: Negative.     Eyes: Negative.    Respiratory: Negative.     Cardiovascular: Negative.    Gastrointestinal: Negative.    Endocrine: Negative.    Genitourinary:  Positive for vaginal bleeding (pink and was just a little and now it is coming out on pad and becoming heavier). Negative for dysuria.   Musculoskeletal: Negative.    Skin: Negative.    Allergic/Immunologic: Negative.    Neurological: Negative.    Hematological: Negative.    Psychiatric/Behavioral: Negative.       /78 (BP Site: Left Upper Arm, Patient Position: Sitting, BP Cuff Size: Large Adult)   Pulse 82   Ht 1.575 m (5' 2\")   Wt 99.8 kg (220 lb)   LMP  (LMP Unknown)   BMI 40.24 kg/m²    No LMP recorded (lmp unknown). Patient is postmenopausal.    Family History   Problem Relation Age of Onset    Diabetes Mother     Heart Disease Mother     Cancer Father     Stomach Cancer Father     Emphysema Sister     Diabetes Maternal Grandmother         also aunts and uncles (maternal)    Breast Cancer Maternal Aunt       Past Medical History:   Diagnosis Date    Abdominal bloating 01/26/2021    Adenomatous polyp of ascending colon 01/26/2021    Allergic rhinitis     Anxiety 07/17/2013    Arthritis     Asthma     Atrial fibrillation (HCC)     Back pain     NERVE/DR. GRACIA    Benign hypertension with CKD

## 2025-05-30 ENCOUNTER — RESULTS FOLLOW-UP (OUTPATIENT)
Dept: OBGYN CLINIC | Age: 75
End: 2025-05-30

## 2025-05-30 DIAGNOSIS — B37.9 CANDIDIASIS: Primary | ICD-10-CM

## 2025-05-30 RX ORDER — FLUCONAZOLE 150 MG/1
TABLET ORAL
Qty: 3 TABLET | Refills: 1 | Status: SHIPPED | OUTPATIENT
Start: 2025-05-30

## 2025-05-30 RX ORDER — FLUCONAZOLE 150 MG/1
TABLET ORAL
Qty: 3 TABLET | Refills: 0 | Status: SHIPPED | OUTPATIENT
Start: 2025-05-30 | End: 2025-05-30

## 2025-05-30 NOTE — TELEPHONE ENCOUNTER
----- Message from VERONIKA Montano - CNP sent at 5/30/2025  8:37 AM EDT -----  Please let the patient know that she has yeast and I sent diflucan 150 she will take 1 tab today and repeat 1 tab every 3 days till gone and I did send a refill

## 2025-05-31 LAB
HPV I/H RISK 4 DNA CVX QL NAA+PROBE: NOT DETECTED
HPV SAMPLE: NORMAL
HPV, INTERPRETATION: NORMAL
HPV16 DNA CVX QL NAA+PROBE: NOT DETECTED
HPV18 DNA CVX QL NAA+PROBE: NOT DETECTED
SPECIMEN DESCRIPTION: NORMAL

## 2025-06-09 ENCOUNTER — OFFICE VISIT (OUTPATIENT)
Dept: OBGYN CLINIC | Age: 75
End: 2025-06-09
Payer: MEDICARE

## 2025-06-09 VITALS
HEART RATE: 80 BPM | SYSTOLIC BLOOD PRESSURE: 110 MMHG | HEIGHT: 62 IN | DIASTOLIC BLOOD PRESSURE: 72 MMHG | WEIGHT: 220 LBS | BODY MASS INDEX: 40.48 KG/M2

## 2025-06-09 DIAGNOSIS — N95.0 POST-MENOPAUSAL BLEEDING: Primary | ICD-10-CM

## 2025-06-09 DIAGNOSIS — Z71.2 ENCOUNTER TO DISCUSS TEST RESULTS: ICD-10-CM

## 2025-06-09 DIAGNOSIS — N39.46 MIXED STRESS AND URGE INCONTINENCE: ICD-10-CM

## 2025-06-09 LAB — CYTOLOGY REPORT: NORMAL

## 2025-06-09 PROCEDURE — 0509F URINE INCON PLAN DOCD: CPT | Performed by: STUDENT IN AN ORGANIZED HEALTH CARE EDUCATION/TRAINING PROGRAM

## 2025-06-09 PROCEDURE — G8417 CALC BMI ABV UP PARAM F/U: HCPCS | Performed by: STUDENT IN AN ORGANIZED HEALTH CARE EDUCATION/TRAINING PROGRAM

## 2025-06-09 PROCEDURE — 1036F TOBACCO NON-USER: CPT | Performed by: STUDENT IN AN ORGANIZED HEALTH CARE EDUCATION/TRAINING PROGRAM

## 2025-06-09 PROCEDURE — 1159F MED LIST DOCD IN RCRD: CPT | Performed by: STUDENT IN AN ORGANIZED HEALTH CARE EDUCATION/TRAINING PROGRAM

## 2025-06-09 PROCEDURE — 99213 OFFICE O/P EST LOW 20 MIN: CPT | Performed by: STUDENT IN AN ORGANIZED HEALTH CARE EDUCATION/TRAINING PROGRAM

## 2025-06-09 PROCEDURE — 3017F COLORECTAL CA SCREEN DOC REV: CPT | Performed by: STUDENT IN AN ORGANIZED HEALTH CARE EDUCATION/TRAINING PROGRAM

## 2025-06-09 PROCEDURE — 3074F SYST BP LT 130 MM HG: CPT | Performed by: STUDENT IN AN ORGANIZED HEALTH CARE EDUCATION/TRAINING PROGRAM

## 2025-06-09 PROCEDURE — G8399 PT W/DXA RESULTS DOCUMENT: HCPCS | Performed by: STUDENT IN AN ORGANIZED HEALTH CARE EDUCATION/TRAINING PROGRAM

## 2025-06-09 PROCEDURE — 1090F PRES/ABSN URINE INCON ASSESS: CPT | Performed by: STUDENT IN AN ORGANIZED HEALTH CARE EDUCATION/TRAINING PROGRAM

## 2025-06-09 PROCEDURE — 3078F DIAST BP <80 MM HG: CPT | Performed by: STUDENT IN AN ORGANIZED HEALTH CARE EDUCATION/TRAINING PROGRAM

## 2025-06-09 PROCEDURE — 1123F ACP DISCUSS/DSCN MKR DOCD: CPT | Performed by: STUDENT IN AN ORGANIZED HEALTH CARE EDUCATION/TRAINING PROGRAM

## 2025-06-09 PROCEDURE — G8427 DOCREV CUR MEDS BY ELIG CLIN: HCPCS | Performed by: STUDENT IN AN ORGANIZED HEALTH CARE EDUCATION/TRAINING PROGRAM

## 2025-06-09 NOTE — PROGRESS NOTES
OB/GYN Follow up Visit  [facility]     Mariangel Abreu  2025                       Primary Care Physician: Nuzhat Ramos DTR    CC:   Chief Complaint   Patient presents with    Results         HPI: Mariangel Abreu is a 74 y.o. female  here for follow up regarding postmenopausal bleeding.    The patient reports bleeding when she wipes for about two months. She has also reported vaginal and vulvar irritation and itching. She does scratch the area and has long fingernails. She reports she wears a pad or brief all the time due to urinary incontinence. She was recently treated for yeast vaginitis and reports significant improvement in the itching and hasn't had bleeding since. The patient is not sexually active.    Ultrasound reveals small uterus with endometrial stripe of 0.28cm. Discussed overall this is reassuring and if bleeding improves, would continue expectant management with perineal care. Discussed bleeding precautions.    REVIEW OF SYSTEMS:   Constitutional: negative fever, negative chills, negative weight changes   HEENT: negative visual disturbances, negative headaches, negative dizziness, negative hearing loss  Breast: Negative breast abnormalities, negative breast lumps, negative nipple discharge  Respiratory: negative dyspnea, negative cough, negative SOB  Cardiovascular: negative chest pain,  negative palpitations, negative arrhythmia, negative syncope   Gastrointestinal: negative abdominal pain, negative RUQ pain, negative N/V, negative diarrhea, negative constipation, negative bowel changes, negative heartburn   Genitourinary: negative dysuria, negative hematuria, negative urinary incontinence, negative vaginal discharge, positive vaginal bleeding  Dermatological: negative rash, positive pruritis, negative mole or other skin changes  Hematologic: negative bruising  Immunologic/Lymphatic: negative recent illness, negative recent sick contact  Musculoskeletal: negative

## 2025-06-11 ENCOUNTER — APPOINTMENT (OUTPATIENT)
Dept: CT IMAGING | Age: 75
DRG: 313 | End: 2025-06-11
Payer: MEDICARE

## 2025-06-11 ENCOUNTER — HOSPITAL ENCOUNTER (INPATIENT)
Age: 75
LOS: 1 days | Discharge: HOME | DRG: 313 | End: 2025-06-12
Attending: STUDENT IN AN ORGANIZED HEALTH CARE EDUCATION/TRAINING PROGRAM
Payer: MEDICARE

## 2025-06-11 ENCOUNTER — APPOINTMENT (OUTPATIENT)
Dept: GENERAL RADIOLOGY | Age: 75
DRG: 313 | End: 2025-06-11
Payer: MEDICARE

## 2025-06-11 DIAGNOSIS — R07.9 CHEST PAIN, UNSPECIFIED TYPE: Primary | ICD-10-CM

## 2025-06-11 LAB
ALBUMIN SERPL-MCNC: 3.8 G/DL (ref 3.5–5.2)
ALP SERPL-CCNC: 85 U/L (ref 35–104)
ALT SERPL-CCNC: 9 U/L (ref 10–35)
ANION GAP SERPL CALCULATED.3IONS-SCNC: 14 MMOL/L (ref 9–16)
AST SERPL-CCNC: 14 U/L (ref 10–35)
BASOPHILS # BLD: 0.07 K/UL (ref 0–0.2)
BASOPHILS NFR BLD: 1 % (ref 0–2)
BILIRUB SERPL-MCNC: <0.2 MG/DL (ref 0–1.2)
BUN SERPL-MCNC: 22 MG/DL (ref 8–23)
CALCIUM SERPL-MCNC: 9.1 MG/DL (ref 8.6–10.4)
CHLORIDE SERPL-SCNC: 97 MMOL/L (ref 98–107)
CO2 SERPL-SCNC: 23 MMOL/L (ref 20–31)
CREAT SERPL-MCNC: 1.2 MG/DL (ref 0.7–1.2)
EOSINOPHIL # BLD: 0.24 K/UL (ref 0–0.44)
EOSINOPHILS RELATIVE PERCENT: 3 % (ref 0–4)
ERYTHROCYTE [DISTWIDTH] IN BLOOD BY AUTOMATED COUNT: 13.7 % (ref 11.5–14.9)
GFR, ESTIMATED: 47 ML/MIN/1.73M2
GLUCOSE BLD-MCNC: 322 MG/DL
GLUCOSE SERPL-MCNC: 429 MG/DL (ref 74–99)
HCT VFR BLD AUTO: 38.4 % (ref 36–46)
HGB BLD-MCNC: 13 G/DL (ref 12–16)
IMM GRANULOCYTES # BLD AUTO: 0.04 K/UL (ref 0–0.3)
IMM GRANULOCYTES NFR BLD: 1 %
INR PPP: 1
LYMPHOCYTES NFR BLD: 1.8 K/UL (ref 1.1–3.7)
LYMPHOCYTES RELATIVE PERCENT: 20 % (ref 24–44)
MAGNESIUM SERPL-MCNC: 1.6 MG/DL (ref 1.6–2.4)
MCH RBC QN AUTO: 30.7 PG (ref 26–34)
MCHC RBC AUTO-ENTMCNC: 33.9 G/DL (ref 31–37)
MCV RBC AUTO: 90.8 FL (ref 80–100)
MONOCYTES NFR BLD: 0.83 K/UL (ref 0.1–1.2)
MONOCYTES NFR BLD: 9 % (ref 3–12)
NEUTROPHILS NFR BLD: 66 % (ref 36–66)
NEUTS SEG NFR BLD: 5.88 K/UL (ref 1.5–8.1)
NRBC BLD-RTO: 0 PER 100 WBC
PLATELET # BLD AUTO: 211 K/UL (ref 150–450)
PMV BLD AUTO: 11.3 FL (ref 8–13.5)
POTASSIUM SERPL-SCNC: 4.3 MMOL/L (ref 3.7–5.3)
PROT SERPL-MCNC: 7 G/DL (ref 6.6–8.7)
PROTHROMBIN TIME: 13.9 SEC (ref 11.8–14.6)
RBC # BLD AUTO: 4.23 M/UL (ref 3.95–5.11)
SODIUM SERPL-SCNC: 134 MMOL/L (ref 136–145)
TROPONIN I SERPL HS-MCNC: 11 NG/L (ref 0–14)
TROPONIN I SERPL HS-MCNC: 12 NG/L (ref 0–14)
WBC OTHER # BLD: 8.9 K/UL (ref 3.5–11)

## 2025-06-11 PROCEDURE — 72125 CT NECK SPINE W/O DYE: CPT

## 2025-06-11 PROCEDURE — 6370000000 HC RX 637 (ALT 250 FOR IP): Performed by: STUDENT IN AN ORGANIZED HEALTH CARE EDUCATION/TRAINING PROGRAM

## 2025-06-11 PROCEDURE — 93005 ELECTROCARDIOGRAM TRACING: CPT | Performed by: STUDENT IN AN ORGANIZED HEALTH CARE EDUCATION/TRAINING PROGRAM

## 2025-06-11 PROCEDURE — 83735 ASSAY OF MAGNESIUM: CPT

## 2025-06-11 PROCEDURE — 80053 COMPREHEN METABOLIC PANEL: CPT

## 2025-06-11 PROCEDURE — 85610 PROTHROMBIN TIME: CPT

## 2025-06-11 PROCEDURE — 85025 COMPLETE CBC W/AUTO DIFF WBC: CPT

## 2025-06-11 PROCEDURE — 84484 ASSAY OF TROPONIN QUANT: CPT

## 2025-06-11 PROCEDURE — 36415 COLL VENOUS BLD VENIPUNCTURE: CPT

## 2025-06-11 PROCEDURE — 2060000000 HC ICU INTERMEDIATE R&B

## 2025-06-11 PROCEDURE — 96372 THER/PROPH/DIAG INJ SC/IM: CPT

## 2025-06-11 PROCEDURE — 74176 CT ABD & PELVIS W/O CONTRAST: CPT

## 2025-06-11 PROCEDURE — 72131 CT LUMBAR SPINE W/O DYE: CPT

## 2025-06-11 PROCEDURE — 99285 EMERGENCY DEPT VISIT HI MDM: CPT

## 2025-06-11 PROCEDURE — 70450 CT HEAD/BRAIN W/O DYE: CPT

## 2025-06-11 PROCEDURE — 73080 X-RAY EXAM OF ELBOW: CPT

## 2025-06-11 RX ORDER — INSULIN LISPRO 100 [IU]/ML
10 INJECTION, SOLUTION INTRAVENOUS; SUBCUTANEOUS ONCE
Status: COMPLETED | OUTPATIENT
Start: 2025-06-11 | End: 2025-06-11

## 2025-06-11 RX ORDER — OXYCODONE AND ACETAMINOPHEN 5; 325 MG/1; MG/1
1 TABLET ORAL ONCE
Refills: 0 | Status: COMPLETED | OUTPATIENT
Start: 2025-06-11 | End: 2025-06-11

## 2025-06-11 RX ADMIN — OXYCODONE HYDROCHLORIDE AND ACETAMINOPHEN 1 TABLET: 5; 325 TABLET ORAL at 21:25

## 2025-06-11 RX ADMIN — INSULIN LISPRO 10 UNITS: 100 INJECTION, SOLUTION INTRAVENOUS; SUBCUTANEOUS at 20:42

## 2025-06-11 ASSESSMENT — PAIN DESCRIPTION - ORIENTATION
ORIENTATION: MID
ORIENTATION: LEFT

## 2025-06-11 ASSESSMENT — ENCOUNTER SYMPTOMS
EYE REDNESS: 0
SORE THROAT: 0
EYE DISCHARGE: 0
VOMITING: 0
ABDOMINAL PAIN: 0
SHORTNESS OF BREATH: 0
DIARRHEA: 0
NAUSEA: 0
RHINORRHEA: 0

## 2025-06-11 ASSESSMENT — PAIN DESCRIPTION - DESCRIPTORS
DESCRIPTORS: ACHING
DESCRIPTORS: DULL

## 2025-06-11 ASSESSMENT — PAIN SCALES - GENERAL
PAINLEVEL_OUTOF10: 8
PAINLEVEL_OUTOF10: 9

## 2025-06-11 ASSESSMENT — PAIN DESCRIPTION - LOCATION
LOCATION: CHEST
LOCATION: CHEST

## 2025-06-11 ASSESSMENT — PAIN - FUNCTIONAL ASSESSMENT: PAIN_FUNCTIONAL_ASSESSMENT: 0-10

## 2025-06-11 NOTE — ED PROVIDER NOTES
EMERGENCY DEPARTMENT ENCOUNTER    Pt Name: Mariangel Abreu  MRN: [id number]  Birthdate 1950  Date of evaluation: 6/11/25  CHIEF COMPLAINT       Chief Complaint   Patient presents with    Chest Pain    Fall     Right hip, right knee, right elbow     HISTORY OF PRESENT ILLNESS   74-year-old history of hypertension, hyperlipidemia, diabetes, CAD with recent stents presenting with chest pain    Patient having some chest pain and pressure.  Started this evening.  Somewhat improved with nitro but still present    Had a fall a few days ago hurt her right elbow and some bruising to her abdomen    No head injury.  No loss of conscious.  No numbness tingling weakness              REVIEW OF SYSTEMS     Review of Systems   Constitutional:  Negative for chills and fever.   HENT:  Negative for rhinorrhea and sore throat.    Eyes:  Negative for discharge and redness.   Respiratory:  Negative for shortness of breath.    Cardiovascular:  Positive for chest pain.   Gastrointestinal:  Negative for abdominal pain, diarrhea, nausea and vomiting.   Genitourinary:  Negative for dysuria, frequency and urgency.   Musculoskeletal:  Negative for arthralgias and myalgias.   Skin:  Negative for rash.   Neurological:  Negative for weakness and numbness.   Psychiatric/Behavioral:  Negative for suicidal ideas.    All other systems reviewed and are negative.    PASTMEDICAL HISTORY     Past Medical History:   Diagnosis Date    Abdominal bloating 01/26/2021    Adenomatous polyp of ascending colon 01/26/2021    Allergic rhinitis     Anxiety 07/17/2013    Arthritis     Asthma     Atrial fibrillation (HCC)     Back pain     NERVE/DR. GRACIA    Benign hypertension with CKD (chronic kidney disease) stage III ([facility])     CAD (coronary artery disease) 03/21/2013    Caffeine use     2 coffee/day    CHF (congestive heart failure) ([facility])     Chronic kidney disease     CKD (chronic kidney disease) stage 3, GFR 30-59 ml/min ([facility])     Claudication 6/2/2024

## 2025-06-12 VITALS
WEIGHT: 220 LBS | TEMPERATURE: 98.1 F | SYSTOLIC BLOOD PRESSURE: 112 MMHG | OXYGEN SATURATION: 92 % | DIASTOLIC BLOOD PRESSURE: 55 MMHG | HEART RATE: 83 BPM | BODY MASS INDEX: 40.48 KG/M2 | HEIGHT: 62 IN | RESPIRATION RATE: 20 BRPM

## 2025-06-12 LAB
ANION GAP SERPL CALCULATED.3IONS-SCNC: 12 MMOL/L (ref 9–16)
BUN SERPL-MCNC: 21 MG/DL (ref 8–23)
CALCIUM SERPL-MCNC: 9.1 MG/DL (ref 8.6–10.4)
CHLORIDE SERPL-SCNC: 102 MMOL/L (ref 98–107)
CO2 SERPL-SCNC: 24 MMOL/L (ref 20–31)
CREAT SERPL-MCNC: 1 MG/DL (ref 0.7–1.2)
ERYTHROCYTE [DISTWIDTH] IN BLOOD BY AUTOMATED COUNT: 13.6 % (ref 11.5–14.9)
GFR, ESTIMATED: 59 ML/MIN/1.73M2
GLUCOSE BLD-MCNC: 131 MG/DL (ref 65–105)
GLUCOSE BLD-MCNC: 180 MG/DL (ref 65–105)
GLUCOSE BLD-MCNC: 301 MG/DL (ref 65–105)
GLUCOSE SERPL-MCNC: 135 MG/DL (ref 74–99)
HCT VFR BLD AUTO: 37 % (ref 36–46)
HGB BLD-MCNC: 12.2 G/DL (ref 12–16)
MCH RBC QN AUTO: 30 PG (ref 26–34)
MCHC RBC AUTO-ENTMCNC: 33 G/DL (ref 31–37)
MCV RBC AUTO: 90.9 FL (ref 80–100)
NRBC BLD-RTO: 0 PER 100 WBC
PLATELET # BLD AUTO: 195 K/UL (ref 150–450)
PMV BLD AUTO: 11.8 FL (ref 8–13.5)
POTASSIUM SERPL-SCNC: 3.8 MMOL/L (ref 3.7–5.3)
RBC # BLD AUTO: 4.07 M/UL (ref 3.95–5.11)
SODIUM SERPL-SCNC: 138 MMOL/L (ref 136–145)
WBC OTHER # BLD: 7 K/UL (ref 3.5–11)

## 2025-06-12 PROCEDURE — 6370000000 HC RX 637 (ALT 250 FOR IP)

## 2025-06-12 PROCEDURE — 80048 BASIC METABOLIC PNL TOTAL CA: CPT

## 2025-06-12 PROCEDURE — 94761 N-INVAS EAR/PLS OXIMETRY MLT: CPT

## 2025-06-12 PROCEDURE — 94640 AIRWAY INHALATION TREATMENT: CPT

## 2025-06-12 PROCEDURE — 6360000002 HC RX W HCPCS

## 2025-06-12 PROCEDURE — 99223 1ST HOSP IP/OBS HIGH 75: CPT

## 2025-06-12 PROCEDURE — 2700000000 HC OXYGEN THERAPY PER DAY

## 2025-06-12 PROCEDURE — 94664 DEMO&/EVAL PT USE INHALER: CPT

## 2025-06-12 PROCEDURE — 2500000003 HC RX 250 WO HCPCS

## 2025-06-12 PROCEDURE — 36415 COLL VENOUS BLD VENIPUNCTURE: CPT

## 2025-06-12 PROCEDURE — 82947 ASSAY GLUCOSE BLOOD QUANT: CPT

## 2025-06-12 PROCEDURE — 85027 COMPLETE CBC AUTOMATED: CPT

## 2025-06-12 RX ORDER — SODIUM CHLORIDE 9 MG/ML
INJECTION, SOLUTION INTRAVENOUS PRN
Status: DISCONTINUED | OUTPATIENT
Start: 2025-06-12 | End: 2025-06-12 | Stop reason: HOSPADM

## 2025-06-12 RX ORDER — INSULIN GLARGINE 100 [IU]/ML
45 INJECTION, SOLUTION SUBCUTANEOUS NIGHTLY
Status: DISCONTINUED | OUTPATIENT
Start: 2025-06-12 | End: 2025-06-12 | Stop reason: HOSPADM

## 2025-06-12 RX ORDER — INSULIN GLARGINE 100 [IU]/ML
50 INJECTION, SOLUTION SUBCUTANEOUS EVERY MORNING
Status: DISCONTINUED | OUTPATIENT
Start: 2025-06-12 | End: 2025-06-12 | Stop reason: HOSPADM

## 2025-06-12 RX ORDER — ATORVASTATIN CALCIUM 80 MG/1
80 TABLET, FILM COATED ORAL DAILY
Status: DISCONTINUED | OUTPATIENT
Start: 2025-06-12 | End: 2025-06-12 | Stop reason: HOSPADM

## 2025-06-12 RX ORDER — MIDODRINE HYDROCHLORIDE 2.5 MG/1
2.5 TABLET ORAL 2 TIMES DAILY WITH MEALS
Status: DISCONTINUED | OUTPATIENT
Start: 2025-06-12 | End: 2025-06-12 | Stop reason: HOSPADM

## 2025-06-12 RX ORDER — ONDANSETRON 2 MG/ML
4 INJECTION INTRAMUSCULAR; INTRAVENOUS EVERY 6 HOURS PRN
Status: DISCONTINUED | OUTPATIENT
Start: 2025-06-12 | End: 2025-06-12 | Stop reason: HOSPADM

## 2025-06-12 RX ORDER — POTASSIUM CHLORIDE 7.45 MG/ML
10 INJECTION INTRAVENOUS PRN
Status: DISCONTINUED | OUTPATIENT
Start: 2025-06-12 | End: 2025-06-12 | Stop reason: HOSPADM

## 2025-06-12 RX ORDER — RANOLAZINE 1000 MG/1
1000 TABLET, EXTENDED RELEASE ORAL 2 TIMES DAILY
Qty: 60 TABLET | Refills: 3 | Status: SHIPPED | OUTPATIENT
Start: 2025-06-12

## 2025-06-12 RX ORDER — ALOGLIPTIN 12.5 MG/1
12.5 TABLET, FILM COATED ORAL DAILY
Status: DISCONTINUED | OUTPATIENT
Start: 2025-06-12 | End: 2025-06-12

## 2025-06-12 RX ORDER — BUDESONIDE AND FORMOTEROL FUMARATE DIHYDRATE 160; 4.5 UG/1; UG/1
1 AEROSOL RESPIRATORY (INHALATION)
Status: DISCONTINUED | OUTPATIENT
Start: 2025-06-12 | End: 2025-06-12 | Stop reason: HOSPADM

## 2025-06-12 RX ORDER — PANTOPRAZOLE SODIUM 40 MG/1
40 TABLET, DELAYED RELEASE ORAL 2 TIMES DAILY
Status: DISCONTINUED | OUTPATIENT
Start: 2025-06-12 | End: 2025-06-12 | Stop reason: HOSPADM

## 2025-06-12 RX ORDER — LIDOCAINE 4 G/G
1 PATCH TOPICAL DAILY
Qty: 30 EACH | Refills: 0 | Status: SHIPPED | OUTPATIENT
Start: 2025-06-13

## 2025-06-12 RX ORDER — GABAPENTIN 100 MG/1
100 CAPSULE ORAL 2 TIMES DAILY
Status: DISCONTINUED | OUTPATIENT
Start: 2025-06-12 | End: 2025-06-12 | Stop reason: HOSPADM

## 2025-06-12 RX ORDER — ENOXAPARIN SODIUM 100 MG/ML
40 INJECTION SUBCUTANEOUS DAILY
Status: DISCONTINUED | OUTPATIENT
Start: 2025-06-12 | End: 2025-06-12 | Stop reason: HOSPADM

## 2025-06-12 RX ORDER — ESCITALOPRAM OXALATE 20 MG/1
20 TABLET ORAL DAILY
Status: DISCONTINUED | OUTPATIENT
Start: 2025-06-12 | End: 2025-06-12 | Stop reason: HOSPADM

## 2025-06-12 RX ORDER — LIDOCAINE 4 G/G
1 PATCH TOPICAL DAILY
Status: DISCONTINUED | OUTPATIENT
Start: 2025-06-12 | End: 2025-06-12 | Stop reason: HOSPADM

## 2025-06-12 RX ORDER — MAGNESIUM SULFATE HEPTAHYDRATE 40 MG/ML
2000 INJECTION, SOLUTION INTRAVENOUS PRN
Status: DISCONTINUED | OUTPATIENT
Start: 2025-06-12 | End: 2025-06-12 | Stop reason: HOSPADM

## 2025-06-12 RX ORDER — INSULIN LISPRO 100 [IU]/ML
0-8 INJECTION, SOLUTION INTRAVENOUS; SUBCUTANEOUS
Status: DISCONTINUED | OUTPATIENT
Start: 2025-06-12 | End: 2025-06-12 | Stop reason: HOSPADM

## 2025-06-12 RX ORDER — TICAGRELOR 90 MG/1
90 TABLET, FILM COATED ORAL 2 TIMES DAILY
Status: DISCONTINUED | OUTPATIENT
Start: 2025-06-12 | End: 2025-06-12 | Stop reason: HOSPADM

## 2025-06-12 RX ORDER — RANOLAZINE 500 MG/1
1000 TABLET, EXTENDED RELEASE ORAL 2 TIMES DAILY
Status: DISCONTINUED | OUTPATIENT
Start: 2025-06-12 | End: 2025-06-12 | Stop reason: HOSPADM

## 2025-06-12 RX ORDER — METOPROLOL TARTRATE 25 MG/1
12.5 TABLET, FILM COATED ORAL 2 TIMES DAILY
Status: DISCONTINUED | OUTPATIENT
Start: 2025-06-12 | End: 2025-06-12 | Stop reason: HOSPADM

## 2025-06-12 RX ORDER — RANOLAZINE 500 MG/1
500 TABLET, EXTENDED RELEASE ORAL 2 TIMES DAILY
Status: DISCONTINUED | OUTPATIENT
Start: 2025-06-12 | End: 2025-06-12

## 2025-06-12 RX ORDER — ACETAMINOPHEN 650 MG/1
650 SUPPOSITORY RECTAL EVERY 6 HOURS PRN
Status: DISCONTINUED | OUTPATIENT
Start: 2025-06-12 | End: 2025-06-12 | Stop reason: HOSPADM

## 2025-06-12 RX ORDER — OXYBUTYNIN CHLORIDE 5 MG/1
5 TABLET, EXTENDED RELEASE ORAL NIGHTLY
Status: DISCONTINUED | OUTPATIENT
Start: 2025-06-12 | End: 2025-06-12 | Stop reason: HOSPADM

## 2025-06-12 RX ORDER — ISOSORBIDE MONONITRATE 60 MG/1
60 TABLET, EXTENDED RELEASE ORAL DAILY
Status: DISCONTINUED | OUTPATIENT
Start: 2025-06-12 | End: 2025-06-12 | Stop reason: HOSPADM

## 2025-06-12 RX ORDER — ONDANSETRON 4 MG/1
4 TABLET, ORALLY DISINTEGRATING ORAL EVERY 8 HOURS PRN
Status: DISCONTINUED | OUTPATIENT
Start: 2025-06-12 | End: 2025-06-12 | Stop reason: HOSPADM

## 2025-06-12 RX ORDER — TOPIRAMATE 100 MG/1
100 TABLET, FILM COATED ORAL DAILY
Status: DISCONTINUED | OUTPATIENT
Start: 2025-06-12 | End: 2025-06-12 | Stop reason: HOSPADM

## 2025-06-12 RX ORDER — ACETAMINOPHEN 325 MG/1
650 TABLET ORAL EVERY 6 HOURS PRN
Status: DISCONTINUED | OUTPATIENT
Start: 2025-06-12 | End: 2025-06-12 | Stop reason: HOSPADM

## 2025-06-12 RX ORDER — POLYETHYLENE GLYCOL 3350 17 G/17G
17 POWDER, FOR SOLUTION ORAL DAILY PRN
Status: DISCONTINUED | OUTPATIENT
Start: 2025-06-12 | End: 2025-06-12 | Stop reason: HOSPADM

## 2025-06-12 RX ORDER — POTASSIUM CHLORIDE 1500 MG/1
40 TABLET, EXTENDED RELEASE ORAL PRN
Status: DISCONTINUED | OUTPATIENT
Start: 2025-06-12 | End: 2025-06-12 | Stop reason: HOSPADM

## 2025-06-12 RX ORDER — DEXTROSE MONOHYDRATE 100 MG/ML
INJECTION, SOLUTION INTRAVENOUS CONTINUOUS PRN
Status: DISCONTINUED | OUTPATIENT
Start: 2025-06-12 | End: 2025-06-12 | Stop reason: HOSPADM

## 2025-06-12 RX ORDER — SODIUM CHLORIDE 0.9 % (FLUSH) 0.9 %
5-40 SYRINGE (ML) INJECTION PRN
Status: DISCONTINUED | OUTPATIENT
Start: 2025-06-12 | End: 2025-06-12 | Stop reason: HOSPADM

## 2025-06-12 RX ADMIN — SODIUM CHLORIDE, PRESERVATIVE FREE 10 ML: 5 INJECTION INTRAVENOUS at 08:45

## 2025-06-12 RX ADMIN — BUDESONIDE AND FORMOTEROL FUMARATE DIHYDRATE 1 PUFF: 160; 4.5 AEROSOL RESPIRATORY (INHALATION) at 08:52

## 2025-06-12 RX ADMIN — ALOGLIPTIN 12.5 MG: 12.5 TABLET, FILM COATED ORAL at 08:51

## 2025-06-12 RX ADMIN — ENOXAPARIN SODIUM 40 MG: 100 INJECTION SUBCUTANEOUS at 08:45

## 2025-06-12 RX ADMIN — GABAPENTIN 100 MG: 100 CAPSULE ORAL at 01:51

## 2025-06-12 RX ADMIN — ESCITALOPRAM OXALATE 20 MG: 20 TABLET ORAL at 08:39

## 2025-06-12 RX ADMIN — TICAGRELOR 90 MG: 90 TABLET ORAL at 08:40

## 2025-06-12 RX ADMIN — INSULIN GLARGINE 45 UNITS: 100 INJECTION, SOLUTION SUBCUTANEOUS at 01:51

## 2025-06-12 RX ADMIN — OXYBUTYNIN CHLORIDE 5 MG: 5 TABLET, EXTENDED RELEASE ORAL at 01:51

## 2025-06-12 RX ADMIN — MIDODRINE HYDROCHLORIDE 2.5 MG: 2.5 TABLET ORAL at 08:39

## 2025-06-12 RX ADMIN — GABAPENTIN 100 MG: 100 CAPSULE ORAL at 08:39

## 2025-06-12 RX ADMIN — METOPROLOL TARTRATE 12.5 MG: 25 TABLET, FILM COATED ORAL at 01:51

## 2025-06-12 RX ADMIN — TICAGRELOR 90 MG: 90 TABLET ORAL at 01:51

## 2025-06-12 RX ADMIN — ACETAMINOPHEN 650 MG: 325 TABLET ORAL at 06:40

## 2025-06-12 RX ADMIN — PANTOPRAZOLE SODIUM 40 MG: 40 TABLET, DELAYED RELEASE ORAL at 08:40

## 2025-06-12 RX ADMIN — INSULIN LISPRO 6 UNITS: 100 INJECTION, SOLUTION INTRAVENOUS; SUBCUTANEOUS at 17:41

## 2025-06-12 RX ADMIN — ISOSORBIDE MONONITRATE 60 MG: 60 TABLET, EXTENDED RELEASE ORAL at 08:51

## 2025-06-12 RX ADMIN — RANOLAZINE 500 MG: 500 TABLET, EXTENDED RELEASE ORAL at 08:39

## 2025-06-12 RX ADMIN — ATORVASTATIN CALCIUM 80 MG: 80 TABLET, FILM COATED ORAL at 08:40

## 2025-06-12 RX ADMIN — INSULIN LISPRO 2 UNITS: 100 INJECTION, SOLUTION INTRAVENOUS; SUBCUTANEOUS at 12:10

## 2025-06-12 RX ADMIN — RANOLAZINE 500 MG: 500 TABLET, EXTENDED RELEASE ORAL at 01:51

## 2025-06-12 ASSESSMENT — PAIN DESCRIPTION - ORIENTATION: ORIENTATION: LEFT

## 2025-06-12 ASSESSMENT — PAIN SCALES - GENERAL: PAINLEVEL_OUTOF10: 3

## 2025-06-12 ASSESSMENT — PAIN DESCRIPTION - DESCRIPTORS: DESCRIPTORS: SORE

## 2025-06-12 ASSESSMENT — PAIN DESCRIPTION - LOCATION: LOCATION: ARM

## 2025-06-12 NOTE — PROGRESS NOTES
ADMIT TO PCU    Patient is in bed.  Patient is A&Ox4 and in no distress at this time.  Bed in lowest and locked position.    Call light within patient's reach.    Telemetry applied.  See posted strip in the chart.  Vitals obtained at time of admit to PCU   Vitals:    06/12/25 0032   BP: 139/71   Pulse: 56   Resp: 20   Temp: 97.5 °F (36.4 °C)   SpO2: 96%      
Patient has been discharged. IV and telemetry removed. Family at bedside to take home. All belongings gathered. Reviewed AVS with patient and answered all questions.   
hcl in nacl], Cipro xr, Ofloxacin, and Sulfa antibiotics    Social History:     Tobacco:    reports that she quit smoking about 25 years ago. Her smoking use included cigarettes. She started smoking about 66 years ago. She has a 123 pack-year smoking history. She has never used smokeless tobacco.  Alcohol:      reports no history of alcohol use.  Drug Use:  reports no history of drug use.    Family History:     Family History   Problem Relation Age of Onset    Diabetes Mother     Heart Disease Mother     Cancer Father     Stomach Cancer Father     Emphysema Sister     Diabetes Maternal Grandmother         also aunts and uncles (maternal)    Breast Cancer Maternal Aunt        Investigations:      Laboratory Testing:  Recent Results (from the past 24 hours)   EKG 12 Lead    Collection Time: 06/11/25  7:29 PM   Result Value Ref Range    Ventricular Rate 78 BPM    Atrial Rate 78 BPM    P-R Interval 120 ms    QRS Duration 68 ms    Q-T Interval 376 ms    QTc Calculation (Bazett) 428 ms    P Axis 85 degrees    R Axis -46 degrees    T Axis 77 degrees   CBC with Auto Differential    Collection Time: 06/11/25  7:30 PM   Result Value Ref Range    WBC 8.9 3.5 - 11.0 k/uL    RBC 4.23 3.95 - 5.11 m/uL    Hemoglobin 13.0 12.0 - 16.0 g/dL    Hematocrit 38.4 36.0 - 46.0 %    MCV 90.8 80.0 - 100.0 fL    MCH 30.7 26.0 - 34.0 pg    MCHC 33.9 31.0 - 37.0 g/dL    RDW 13.7 11.5 - 14.9 %    Platelets 211 150 - 450 k/uL    MPV 11.3 8.0 - 13.5 fL    NRBC Automated 0.0 0 per 100 WBC    Neutrophils % 66 36 - 66 %    Lymphocytes % 20 (L) 24 - 44 %    Monocytes % 9 3 - 12 %    Eosinophils % 3 0 - 4 %    Basophils % 1 0 - 2 %    Immature Granulocytes % 1 (H) 0 %    Neutrophils Absolute 5.88 1.50 - 8.10 k/uL    Lymphocytes Absolute 1.80 1.10 - 3.70 k/uL    Monocytes Absolute 0.83 0.10 - 1.20 k/uL    Eosinophils Absolute 0.24 0.00 - 0.44 k/uL    Basophils Absolute 0.07 0.00 - 0.20 k/uL    Immature Granulocytes Absolute 0.04 0.00 - 0.30 k/uL   CMP

## 2025-06-12 NOTE — ACP (ADVANCE CARE PLANNING)
Advance Care Planning     Advance Care Planning Activator (Inpatient)  Conversation Note      Date of ACP Conversation: 6/12/2025     Conversation Conducted with: Patient with Decision Making Capacity    ACP Activator: Karolyn Poretr RN    Health Care Decision Maker:     Current Designated Health Care Decision Maker:     Primary Decision Maker (Active): David Waller - Other Relative - [phone]  Click here to complete Healthcare Decision Makers including section of the Healthcare Decision Maker Relationship (ie \"Primary\")  Today we documented Decision Maker(s) consistent with Legal Next of Kin hierarchy.    Care Preferences    Ventilation:  \"If you were in your present state of health and suddenly became very ill and were unable to breathe on your own, what would your preference be about the use of a ventilator (breathing machine) if it were available to you?\"      Would the patient desire the use of ventilator (breathing machine)?: yes    \"If your health worsens and it becomes clear that your chance of recovery is unlikely, what would your preference be about the use of a ventilator (breathing machine) if it were available to you?\"     Would the patient desire the use of ventilator (breathing machine)?: No      Resuscitation  \"CPR works best to restart the heart when there is a sudden event, like a heart attack, in someone who is otherwise healthy. Unfortunately, CPR does not typically restart the heart for people who have serious health conditions or who are very sick.\"    \"In the event your heart stopped as a result of an underlying serious health condition, would you want attempts to be made to restart your heart (answer \"yes\" for attempt to resuscitate) or would you prefer a natural death (answer \"no\" for do not attempt to resuscitate)?\" yes       [] Yes   [x] No   Educated Patient / Decision Maker regarding differences between Advance Directives and portable DNR orders.    Length of ACP Conversation in

## 2025-06-12 NOTE — ED NOTES
Mode of arrival (squad #, walk in, police, etc) : EMS        Chief complaint(s): CP, Fall        Arrival Note (brief scenario, treatment PTA, etc).: Pt arrived to the ED via EMS for chest pain. Pt states she took her 81mg aspirin and then I nitro and got no relief. Pt called EMS and they gave her another dose of nitro and then 3 other of aspirin. Pt denies relief from medication. Pt states also two days ago she fell. Pt states that her walker got tripped up on the sidewalk and hurt her right elbow, right knee and right hip. Pt denies hitting her head.       
radiculopathy.         CT Head W/O Contrast   Final Result   Soft tissue swelling at the right elbow.  No acute osseous abnormality or   effusion      No acute findings on the CT scan of the abdomen and pelvis.      No acute process of the head or cervical spine.      Stable chronic small vessel ischemia.      Severe degenerative disc disease with multilevel spinal canal stenosis and   neural foraminal narrowing of the thoracic, lumbar, and cervical spine, as   above.  Correlate for radiculopathy.         XR ELBOW RIGHT (MIN 3 VIEWS)   Final Result   Soft tissue swelling at the right elbow.  No acute osseous abnormality or   effusion      No acute findings on the CT scan of the abdomen and pelvis.      No acute process of the head or cervical spine.      Stable chronic small vessel ischemia.      Severe degenerative disc disease with multilevel spinal canal stenosis and   neural foraminal narrowing of the thoracic, lumbar, and cervical spine, as   above.  Correlate for radiculopathy.           Wounds   Puncture 04/29/25 Femoral (Active)   Number of days: 43       Wound 06/11/25 Elbow Posterior;Right (Active)   Number of days: 0       Wound 06/11/25 Abdomen Right;Anterior (Active)   Number of days: 0       Recommendation  Outstanding testing: N/A  Family notified:   Contact information:   Decision Maker: Self  Consulting MD: None    Colette Mccullough RN

## 2025-06-12 NOTE — CARE COORDINATION
who? No  Plans to Return to Present Housing: Yes  Other Identified Issues/Barriers to RETURNING to current housing: Pain  Potential Assistance needed at discharge: Home Care            Potential DME:    Patient expects to discharge to: Apartment  Plan for transportation at discharge: Family    Financial    Payor: [employer] MEDICARE / Plan: [employer] MEDICARE ADVANTAGE HMO / Product Type: Medicare /     Does insurance require precert for SNF: Yes    Potential assistance Purchasing Medications: No  Meds-to-Beds request:        [place], [place] - [address] [phone] - F [phone]  [address] [facility] [address] [facility] [address]  Phone: [phone] Fax: [phone]      Notes:    Factors facilitating achievement of predicted outcomes: Family support, Cooperative, and Pleasant    Barriers to discharge: Pain    Additional Case Management Notes: The patient is from a second floor apartment (elevator) alone, independent, family/cab transport. Follows at [facility].    DME: Rollator, glucometer, nebulizer, O2 at 2L HS/PRN ([employer]), Cpap, SC, GB, electric scooter.    VNS: [facility] accepts.    Cardiology consulted-chest pain. Currently on 2L NC oxygen, saturation 99%     The Plan for Transition of Care is related to the following treatment goals of Chest pain [R07.9]  Chest pain, unspecified type [R07.9]    IF APPLICABLE: The Patient and/or patient representative Mariangel and her family were provided with a choice of provider and agrees with the discharge plan. Freedom of choice list with basic dialogue that supports the patient's individualized plan of care/goals and shares the quality data associated with the providers was provided to: Patient   Patient Representative Name:       The Patient and/or Patient Representative Agree with the Discharge Plan? Yes    Karolyn Porter RN  Case Management Department  Ph: [phone] Fax: [phone]

## 2025-06-12 NOTE — CONSULTS
Facial pain, acute    Type 2 diabetes mellitus with hyperglycemia, with long-term current use of insulin (HCC)    Uncontrolled blood glucose    Elevated erythrocyte sedimentation rate    Elevated C-reactive protein (CRP)    Chest pain    Sprain of temporomandibular joint or ligament    Neck pain    Unstable angina (HCC)    Syncope and collapse    Moderate malnutrition    S/P cardiac catheterization    Coronary artery disease due to calcified coronary lesion    S/P angioplasty with stent     Atypical CP  Known CAD/MVD with recent PCI as above  Mechanical fall  Preserved LVEF on echo  Morbid obesity with BMI >40  HTN  COPD/HIGINIO  Chronic back pain  DM2    RECOMMENDATIONS:  Stable. Trop negative and no acute ECG changes.  Continue present meds. No plans for further ischemic work-up  Will increase Ranexa to 1000mg BID. Continue Imdur 60mg daily as BP soft at times  OK for d/c from CV standpoint with OP f/u in clinic as scheduled (she was just seen last month).      Discussed with patient and Nurse    Karen Crain, VERONIKA - CNP    [facility]           [phone]

## 2025-06-12 NOTE — H&P
[facility] Internal Medicine   Mejia Kelly MD; MD Ivana Arnold MD, MD , Sammy Hoyos MD    [facility] Internal Medicine   IN-PATIENT SERVICE   [facility]    HISTORY AND PHYSICAL EXAMINATION            Date:   6/12/2025  Patient name:  Mariangel Abreu  Date of admission:  6/11/2025  7:11 PM  MRN:   [id number]  Account:  [id number]  YOB: 1950  PCP:    Nuzhat Ramos DTR  Room:   2097/2097-01  Code Status:    Full Code    Chief Complaint:     Chief Complaint   Patient presents with    Chest Pain    Fall     Right hip, right knee, right elbow       History Obtained From:     patient, electronic medical record    History of Present Illness:     Mariangel Abreu is a 74 y.o. Non- / non  female who presents with Chest Pain and Fall (Right hip, right knee, right elbow)   and is admitted to the hospital for the management of Chest pain.    74-year-old female with past history of cardiovascular disease with multiple stents in place.  Patient presented after a fall.  Patient states that she was sitting in the walker when it fell over on her right side.  Patient states that she was not dizzy or lightheaded prior to the fall.  States that she did not lock the walker when she fell.  She struck her elbow and has some bruises in the right lower abdomen as well as some bruises in her right lower extremity.    The next day patient started having chest pain.  Described as a shooting sensation.  Patient took a nitro at home, which resolved the chest pain.  In the ED patient's EKG was unremarkable.  Was noted to have elevated blood glucose of 429.  CT scan of the abdomen pelvis showed no acute findings.  Some soft tissue swelling of the right elbow secondary to the fall.  Patient recently had left heart cath in April 2025 with stent placement, with  ELEANOR to prox/mid LCX and ELEANOR x2 to OM1 on 4/29/25.     Patient is

## 2025-06-12 NOTE — DISCHARGE INSTR - COC
L/min per nasal cannula. AS needed  Ventilator:    - No ventilator support    Rehab Therapies: Physical Therapy and Occupational Therapy  Weight Bearing Status/Restrictions: No weight bearing restrictions  Other Medical Equipment (for information only, NOT a DME order):  wheelchair  Other Treatments: Skilled Nursing assessment and monitoring. Medication education and monitoring per protocol.     Patient's personal belongings (please select all that are sent with patient):  All patient belongings gathered by family.    RN SIGNATURE:  Electronically signed by Tangela Guzman RN on 6/12/25 at 6:02 PM EDT    CASE MANAGEMENT/SOCIAL WORK SECTION    Inpatient Status Date: 6/11/25    Readmission Risk Assessment Score:  [facility] RISK OF UNPLANNED READMISSION 2.0             33 Total Score        Discharging to Facility/ Agency       Dialysis Facility (if applicable)   Name:  Address:  Dialysis Schedule:  Phone:  Fax:    / signature: Electronically signed by Karolyn Porter RN on 6/12/25 at 1:36 PM EDT    PHYSICIAN SECTION    Prognosis: {Prognosis:7559612356}    Condition at Discharge: { Patient Condition:902886943}    Rehab Potential (if transferring to Rehab): {Prognosis:7286199249}    Recommended Labs or Other Treatments After Discharge: ***    Physician Certification: I certify the above information and transfer of Mariangel Abreu  is necessary for the continuing treatment of the diagnosis listed and that she requires {Admit to Appropriate Level of Care:48427} for {GREATER/LESS:167237501} 30 days.     Update Admission H&P: {CHP DME Changes in HandP:272041172}    PHYSICIAN SIGNATURE:  {Esignature:476693845}

## 2025-06-13 LAB
EKG ATRIAL RATE: 78 BPM
EKG P AXIS: 85 DEGREES
EKG P-R INTERVAL: 120 MS
EKG Q-T INTERVAL: 376 MS
EKG QRS DURATION: 68 MS
EKG QTC CALCULATION (BAZETT): 428 MS
EKG R AXIS: -46 DEGREES
EKG T AXIS: 77 DEGREES
EKG VENTRICULAR RATE: 78 BPM

## 2025-06-22 ENCOUNTER — HOSPITAL ENCOUNTER (EMERGENCY)
Age: 75
Discharge: HOME | End: 2025-06-22
Attending: EMERGENCY MEDICINE
Payer: MEDICARE

## 2025-06-22 VITALS
SYSTOLIC BLOOD PRESSURE: 164 MMHG | WEIGHT: 226 LBS | RESPIRATION RATE: 18 BRPM | HEART RATE: 85 BPM | OXYGEN SATURATION: 95 % | DIASTOLIC BLOOD PRESSURE: 77 MMHG | HEIGHT: 62 IN | TEMPERATURE: 98.2 F | BODY MASS INDEX: 41.59 KG/M2

## 2025-06-22 DIAGNOSIS — M25.511 ACUTE PAIN OF RIGHT SHOULDER: ICD-10-CM

## 2025-06-22 DIAGNOSIS — M25.551 RIGHT HIP PAIN: ICD-10-CM
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
## 2025-06-22 PROCEDURE — 96372 THER/PROPH/DIAG INJ SC/IM: CPT

## 2025-06-22 PROCEDURE — 99284 EMERGENCY DEPT VISIT MOD MDM: CPT

## 2025-06-22 PROCEDURE — 6360000002 HC RX W HCPCS: Performed by: EMERGENCY MEDICINE

## 2025-06-22 RX ORDER — KETOROLAC TROMETHAMINE 30 MG/ML
30 INJECTION, SOLUTION INTRAMUSCULAR; INTRAVENOUS ONCE
Status: COMPLETED | OUTPATIENT
Start: 2025-06-22 | End: 2025-06-22

## 2025-06-22 RX ORDER — CYCLOBENZAPRINE HCL 10 MG
10 TABLET ORAL 3 TIMES DAILY PRN
Qty: 21 TABLET | Refills: 0 | Status: SHIPPED | OUTPATIENT
Start: 2025-06-22 | End: 2025-07-02

## 2025-06-22 RX ORDER — IBUPROFEN 600 MG/1
600 TABLET, FILM COATED ORAL 3 TIMES DAILY PRN
Qty: 30 TABLET | Refills: 0 | Status: SHIPPED | OUTPATIENT
Start: 2025-06-22

## 2025-06-22 RX ADMIN — KETOROLAC TROMETHAMINE 30 MG: 30 INJECTION, SOLUTION INTRAMUSCULAR at 19:51

## 2025-06-22 ASSESSMENT — PAIN SCALES - GENERAL: PAINLEVEL_OUTOF10: 8

## 2025-06-22 ASSESSMENT — PAIN DESCRIPTION - ORIENTATION: ORIENTATION: RIGHT

## 2025-06-22 ASSESSMENT — PAIN - FUNCTIONAL ASSESSMENT: PAIN_FUNCTIONAL_ASSESSMENT: 0-10

## 2025-06-22 ASSESSMENT — PAIN DESCRIPTION - LOCATION: LOCATION: HIP;SHOULDER

## 2025-06-22 NOTE — ED PROVIDER NOTES
EMERGENCY DEPARTMENT ENCOUNTER    Pt Name: Mariangel Abreu  MRN: [id number]  Birthdate 1950  Date of evaluation: 6/22/25  CHIEF COMPLAINT       Chief Complaint   Patient presents with    Fall    Hip Pain    Shoulder Pain     HISTORY OF PRESENT ILLNESS   74-year-old female presenting to the ER complaining of right shoulder and right hip pain.  Patient states it started after a fall 2 weeks ago.  Patient was already seen at the ER for the fall.  The patient is worried because the pain is persisting.  Patient is still able to take care of herself at home.    The history is provided by the patient.   Illness   The current episode started more than 1 week ago. Pertinent negatives include no photophobia, no abdominal pain, no nausea, no vomiting, no headaches, no rash and no eye pain.           REVIEW OF SYSTEMS     Review of Systems   Constitutional:  Negative for activity change, appetite change and fatigue.   HENT:  Negative for facial swelling, trouble swallowing and voice change.    Eyes:  Negative for photophobia and pain.   Respiratory:  Negative for chest tightness and shortness of breath.    Cardiovascular:  Negative for chest pain and palpitations.   Gastrointestinal:  Negative for abdominal pain, nausea and vomiting.   Genitourinary:  Negative for dysuria and urgency.   Musculoskeletal:  Positive for arthralgias (R shoulder and R hip). Negative for back pain.   Skin:  Negative for color change and rash.   Neurological:  Negative for dizziness, syncope and headaches.   Psychiatric/Behavioral:  Negative for behavioral problems and hallucinations.      PASTMEDICAL HISTORY     Past Medical History:   Diagnosis Date    Abdominal bloating 01/26/2021    Adenomatous polyp of ascending colon 01/26/2021    Allergic rhinitis     Anxiety 07/17/2013    Arthritis     Asthma     Atrial fibrillation (HCC)     Back pain     NERVE/DR. GRACIA    Benign hypertension with CKD (chronic kidney disease) stage III (HCC)     CAD

## 2025-07-06 ENCOUNTER — HOSPITAL ENCOUNTER (EMERGENCY)
Age: 75
Discharge: HOME | End: 2025-07-06
Attending: EMERGENCY MEDICINE
Payer: MEDICARE

## 2025-07-06 ENCOUNTER — APPOINTMENT (OUTPATIENT)
Dept: GENERAL RADIOLOGY | Age: 75
End: 2025-07-06
Payer: MEDICARE

## 2025-07-06 VITALS
WEIGHT: 226 LBS | RESPIRATION RATE: 15 BRPM | DIASTOLIC BLOOD PRESSURE: 72 MMHG | TEMPERATURE: 99 F | SYSTOLIC BLOOD PRESSURE: 110 MMHG | OXYGEN SATURATION: 96 % | HEIGHT: 62 IN | BODY MASS INDEX: 41.59 KG/M2 | HEART RATE: 75 BPM

## 2025-07-06 DIAGNOSIS — R42 LIGHTHEADEDNESS: Primary | ICD-10-CM

## 2025-07-06 LAB
ALBUMIN SERPL-MCNC: 4.2 G/DL (ref 3.5–5.2)
ALP SERPL-CCNC: 84 U/L (ref 35–104)
ALT SERPL-CCNC: 13 U/L (ref 10–35)
ANION GAP SERPL CALCULATED.3IONS-SCNC: 13 MMOL/L (ref 9–16)
AST SERPL-CCNC: 18 U/L (ref 10–35)
BASOPHILS # BLD: 0.06 K/UL (ref 0–0.2)
BASOPHILS NFR BLD: 1 % (ref 0–2)
BILIRUB SERPL-MCNC: 0.3 MG/DL (ref 0–1.2)
BILIRUB UR QL STRIP: NEGATIVE
BNP SERPL-MCNC: 204 PG/ML (ref 0–300)
BUN SERPL-MCNC: 20 MG/DL (ref 8–23)
CALCIUM SERPL-MCNC: 9.1 MG/DL (ref 8.6–10.4)
CHLORIDE SERPL-SCNC: 96 MMOL/L (ref 98–107)
CHP ED QC CHECK: YES
CLARITY UR: CLEAR
CO2 SERPL-SCNC: 25 MMOL/L (ref 20–31)
COLOR UR: YELLOW
COMMENT: ABNORMAL
CREAT SERPL-MCNC: 1.3 MG/DL (ref 0.7–1.2)
EOSINOPHIL # BLD: 0.27 K/UL (ref 0–0.44)
EOSINOPHILS RELATIVE PERCENT: 3 % (ref 0–4)
ERYTHROCYTE [DISTWIDTH] IN BLOOD BY AUTOMATED COUNT: 13.4 % (ref 11.5–14.9)
GFR, ESTIMATED: 43 ML/MIN/1.73M2
GLUCOSE BLD-MCNC: 248 MG/DL
GLUCOSE BLD-MCNC: 248 MG/DL (ref 65–105)
GLUCOSE SERPL-MCNC: 196 MG/DL (ref 74–99)
GLUCOSE UR STRIP-MCNC: ABNORMAL MG/DL
HCT VFR BLD AUTO: 45.5 % (ref 36–46)
HGB BLD-MCNC: 14.7 G/DL (ref 12–16)
HGB UR QL STRIP.AUTO: NEGATIVE
IMM GRANULOCYTES # BLD AUTO: 0.04 K/UL (ref 0–0.3)
IMM GRANULOCYTES NFR BLD: 1 %
KETONES UR STRIP-MCNC: NEGATIVE MG/DL
LACTATE BLDV-SCNC: 2.1 MMOL/L (ref 0.5–1.9)
LACTATE BLDV-SCNC: 2.3 MMOL/L (ref 0.5–1.9)
LEUKOCYTE ESTERASE UR QL STRIP: NEGATIVE
LYMPHOCYTES NFR BLD: 1.81 K/UL (ref 1.1–3.7)
LYMPHOCYTES RELATIVE PERCENT: 21 % (ref 24–44)
MCH RBC QN AUTO: 29.5 PG (ref 26–34)
MCHC RBC AUTO-ENTMCNC: 32.3 G/DL (ref 31–37)
MCV RBC AUTO: 91.2 FL (ref 80–100)
MONOCYTES NFR BLD: 0.68 K/UL (ref 0.1–1.2)
MONOCYTES NFR BLD: 8 % (ref 3–12)
NEUTROPHILS NFR BLD: 66 % (ref 36–66)
NEUTS SEG NFR BLD: 5.87 K/UL (ref 1.5–8.1)
NITRITE UR QL STRIP: NEGATIVE
NRBC BLD-RTO: 0 PER 100 WBC
PH UR STRIP: 5.5 [PH] (ref 5–8)
PLATELET # BLD AUTO: 218 K/UL (ref 150–450)
PMV BLD AUTO: 11.2 FL (ref 8–13.5)
POTASSIUM SERPL-SCNC: 4.3 MMOL/L (ref 3.7–5.3)
PROCALCITONIN SERPL-MCNC: 0.07 NG/ML (ref 0–0.09)
PROT SERPL-MCNC: 7.5 G/DL (ref 6.6–8.7)
PROT UR STRIP-MCNC: NEGATIVE MG/DL
RBC # BLD AUTO: 4.99 M/UL (ref 3.95–5.11)
SODIUM SERPL-SCNC: 134 MMOL/L (ref 136–145)
SP GR UR STRIP: 1.03 (ref 1–1.03)
TROPONIN I SERPL HS-MCNC: 12 NG/L (ref 0–14)
TROPONIN I SERPL HS-MCNC: 14 NG/L (ref 0–14)
TROPONIN I SERPL HS-MCNC: 16 NG/L (ref 0–14)
UROBILINOGEN UR STRIP-ACNC: NORMAL EU/DL (ref 0–1)
WBC OTHER # BLD: 8.7 K/UL (ref 3.5–11)

## 2025-07-06 PROCEDURE — 93005 ELECTROCARDIOGRAM TRACING: CPT | Performed by: STUDENT IN AN ORGANIZED HEALTH CARE EDUCATION/TRAINING PROGRAM

## 2025-07-06 PROCEDURE — 83605 ASSAY OF LACTIC ACID: CPT

## 2025-07-06 PROCEDURE — 86403 PARTICLE AGGLUT ANTBDY SCRN: CPT

## 2025-07-06 PROCEDURE — 36415 COLL VENOUS BLD VENIPUNCTURE: CPT

## 2025-07-06 PROCEDURE — 71045 X-RAY EXAM CHEST 1 VIEW: CPT

## 2025-07-06 PROCEDURE — 96360 HYDRATION IV INFUSION INIT: CPT

## 2025-07-06 PROCEDURE — 87040 BLOOD CULTURE FOR BACTERIA: CPT

## 2025-07-06 PROCEDURE — 81003 URINALYSIS AUTO W/O SCOPE: CPT

## 2025-07-06 PROCEDURE — 96361 HYDRATE IV INFUSION ADD-ON: CPT

## 2025-07-06 PROCEDURE — 80053 COMPREHEN METABOLIC PANEL: CPT

## 2025-07-06 PROCEDURE — 99285 EMERGENCY DEPT VISIT HI MDM: CPT

## 2025-07-06 PROCEDURE — 85025 COMPLETE CBC W/AUTO DIFF WBC: CPT

## 2025-07-06 PROCEDURE — 83880 ASSAY OF NATRIURETIC PEPTIDE: CPT

## 2025-07-06 PROCEDURE — 87205 SMEAR GRAM STAIN: CPT

## 2025-07-06 PROCEDURE — 84145 PROCALCITONIN (PCT): CPT

## 2025-07-06 PROCEDURE — 82947 ASSAY GLUCOSE BLOOD QUANT: CPT

## 2025-07-06 PROCEDURE — 2580000003 HC RX 258: Performed by: STUDENT IN AN ORGANIZED HEALTH CARE EDUCATION/TRAINING PROGRAM

## 2025-07-06 PROCEDURE — 84484 ASSAY OF TROPONIN QUANT: CPT

## 2025-07-06 RX ORDER — 0.9 % SODIUM CHLORIDE 0.9 %
1000 INTRAVENOUS SOLUTION INTRAVENOUS ONCE
Status: COMPLETED | OUTPATIENT
Start: 2025-07-06 | End: 2025-07-06

## 2025-07-06 RX ADMIN — SODIUM CHLORIDE 1000 ML: 0.9 INJECTION, SOLUTION INTRAVENOUS at 16:07

## 2025-07-06 ASSESSMENT — PAIN - FUNCTIONAL ASSESSMENT: PAIN_FUNCTIONAL_ASSESSMENT: NONE - DENIES PAIN

## 2025-07-06 NOTE — ED PROVIDER NOTES
EMERGENCY DEPARTMENT ENCOUNTER   ATTENDING ATTESTATION     Pt Name: Mariangel Abreu  MRN: [id number]  Birthdate 1950  Date of evaluation: 7/6/25       Mariangel Abreu is a 74 y.o. female who presents with Shaking, Fatigue, and Dizziness      MDM: 74-year-old female presents for fatigue and lightheadedness.  Reported that she was at home today and began feeling fatigued and lightheaded and presented for evaluation.  She denies chest pain shortness of breath abdominal pain nausea or vomiting.  Denies recent illness    Sinus rhythm with sinus arrhythmia rate of 73, left axis, normal intervals, no ST segment elevation or depression nonspecific T wave changes    Will check labs imaging    Labs reviewed showing no significant abnormalities x-ray showed no acute abnormality blood pressure improved with fluids, patient reports she is now feeling better discussed admission versus outpatient follow-up she states she would like to go home at this time she was given strict return precautions    Patient/Guardian was informed of their diagnosis and told to follow up with PCP  in 1-3 days. Patient demonstrates understanding and agreement with the plan. They were given the opportunity to ask questions and those questions were answered to the best of our ability with the available information. Patient/Guardian told to return to the ED for any new, worsening, changing or persistent symptoms.       This dictation was prepared using Dragon Medical voice recognition software.       Vitals Reviewed:    Vitals:    07/06/25 1900 07/06/25 1915 07/06/25 1930 07/06/25 2115   BP: 115/87 (!) 106/49 105/70 110/72   Pulse: 72 74 81 75   Resp: 21 19 18 15   Temp:    99 °F (37.2 °C)   TempSrc:    Oral   SpO2: 95% 96% 96% 96%   Weight:       Height:           Initial Pain Score:    Last Pain Score:        I personally saw and examined the patient. I have reviewed and agree with the resident's findings including all diagnostic 
from 2.3. [CH]   1714 Troponin, High Sensitivity: 14  Repeat troponin 14 from 16. [CH]   1803 Patient reassessed, blood pressures improving with IV fluids.  Patient still complaining of feeling lightheaded.  Now complaining of chest pain that started a few minutes ago.  Chest pain located in the central chest, nonradiating, pressure-like in nature, reproducible with palpation.  Will obtain repeat troponin and EKG. [CH]   1856 Troponin, High Sensitivity: 12  Troponin 12 from 14 from 16. [CH]   2107 Urinalysis with Reflex to Culture(!):    Color, UA Yellow   Turbidity UA Clear   Glucose, Ur MOD(!)   Bilirubin, Urine NEGATIVE   Ketones, Urine NEGATIVE   Specific [place], UA 1.035(!)   Urine Hgb NEGATIVE   pH, Urine 5.5   Protein, UA NEGATIVE   Urobilinogen Normal   Nitrite, Urine NEGATIVE   Leukocyte Esterase, Urine NEGATIVE   COMMENT, [id number] Microscopic exam not performed based on chemical results unless requested in original order.  UA concentrated with specific Sarah 1.035, otherwise without acute concerns.  No evidence to suggest urinary tract infection. [CH]   2113 Patient updated on workup findings.  Patient is at baseline currently, blood pressure remains improved patient feeling well.  Will discharge at this time, counseled to stay well-hydrated, counseled on return precautions for ED. [CH]      ED Course User Index  [CH] Shaq Sanchez MD       PROCEDURES:      CONSULTS:  None    CRITICAL CARE:  There was significant risk of life threatening deterioration of patient's condition requiring my direct management. Critical care time 0 minutes, excluding any documented procedures.    FINAL IMPRESSION      1. Lightheadedness          DISPOSITION / PLAN     DISPOSITION Decision To Discharge 07/06/2025 09:12:56 PM   DISPOSITION CONDITION Stable           PATIENT REFERRED TO:  Carla Chua, APRN - CNP  [address] [facility] [id number] [phone]    Schedule an appointment as soon as possible for a

## 2025-07-07 LAB
EKG ATRIAL RATE: 73 BPM
EKG P AXIS: 55 DEGREES
EKG P-R INTERVAL: 174 MS
EKG Q-T INTERVAL: 352 MS
EKG QRS DURATION: 86 MS
EKG QTC CALCULATION (BAZETT): 387 MS
EKG R AXIS: -48 DEGREES
EKG T AXIS: 93 DEGREES
EKG VENTRICULAR RATE: 73 BPM

## 2025-07-07 PROCEDURE — 93010 ELECTROCARDIOGRAM REPORT: CPT | Performed by: INTERNAL MEDICINE

## 2025-07-07 NOTE — DISCHARGE INSTRUCTIONS
Be sure to follow up with your primary care provider.  Return to emergency department for any acutely worsening/changing symptoms or any other acute concerns.  Be sure to stay well hydrated.

## 2025-07-08 LAB
EKG ATRIAL RATE: 72 BPM
EKG P AXIS: 75 DEGREES
EKG P-R INTERVAL: 172 MS
EKG Q-T INTERVAL: 364 MS
EKG QRS DURATION: 84 MS
EKG QTC CALCULATION (BAZETT): 398 MS
EKG R AXIS: -57 DEGREES
EKG T AXIS: 78 DEGREES
EKG VENTRICULAR RATE: 72 BPM
MICROORGANISM SPEC CULT: ABNORMAL
SERVICE CMNT-IMP: ABNORMAL
SPECIMEN DESCRIPTION: ABNORMAL

## 2025-07-08 PROCEDURE — 93010 ELECTROCARDIOGRAM REPORT: CPT | Performed by: INTERNAL MEDICINE

## 2025-07-11 LAB
MICROORGANISM SPEC CULT: NORMAL
SERVICE CMNT-IMP: NORMAL
SPECIMEN DESCRIPTION: NORMAL

## 2025-07-25 ENCOUNTER — APPOINTMENT (OUTPATIENT)
Dept: GENERAL RADIOLOGY | Age: 75
End: 2025-07-25
Payer: MEDICARE

## 2025-07-25 ENCOUNTER — APPOINTMENT (OUTPATIENT)
Dept: CT IMAGING | Age: 75
End: 2025-07-25
Payer: MEDICARE

## 2025-07-25 ENCOUNTER — HOSPITAL ENCOUNTER (EMERGENCY)
Age: 75
Discharge: HOME | End: 2025-07-26
Attending: EMERGENCY MEDICINE
Payer: MEDICARE

## 2025-07-25 DIAGNOSIS — K52.9 GASTROENTERITIS: Primary | ICD-10-CM

## 2025-07-25 DIAGNOSIS — R73.9 HYPERGLYCEMIA: ICD-10-CM

## 2025-07-25 LAB
ALBUMIN SERPL-MCNC: 4.2 G/DL (ref 3.5–5.2)
ALP SERPL-CCNC: 90 U/L (ref 35–104)
ALT SERPL-CCNC: 14 U/L (ref 10–35)
ANION GAP SERPL CALCULATED.3IONS-SCNC: 13 MMOL/L (ref 9–16)
AST SERPL-CCNC: 16 U/L (ref 10–35)
BACTERIA URNS QL MICRO: ABNORMAL
BASOPHILS # BLD: 0.06 K/UL (ref 0–0.2)
BASOPHILS NFR BLD: 1 % (ref 0–2)
BILIRUB SERPL-MCNC: 0.3 MG/DL (ref 0–1.2)
BILIRUB UR QL STRIP: NEGATIVE
BUN SERPL-MCNC: 22 MG/DL (ref 8–23)
CALCIUM SERPL-MCNC: 9.5 MG/DL (ref 8.6–10.4)
CASTS #/AREA URNS LPF: ABNORMAL /LPF
CHLORIDE SERPL-SCNC: 94 MMOL/L (ref 98–107)
CLARITY UR: CLEAR
CO2 SERPL-SCNC: 25 MMOL/L (ref 20–31)
COLOR UR: YELLOW
CREAT SERPL-MCNC: 1.2 MG/DL (ref 0.7–1.2)
EKG ATRIAL RATE: 82 BPM
EKG P AXIS: 49 DEGREES
EKG P-R INTERVAL: 128 MS
EKG Q-T INTERVAL: 336 MS
EKG QRS DURATION: 74 MS
EKG QTC CALCULATION (BAZETT): 392 MS
EKG R AXIS: -50 DEGREES
EKG T AXIS: 116 DEGREES
EKG VENTRICULAR RATE: 82 BPM
EOSINOPHIL # BLD: 0.1 K/UL (ref 0–0.44)
EOSINOPHILS RELATIVE PERCENT: 1 % (ref 0–4)
EPI CELLS #/AREA URNS HPF: ABNORMAL /HPF
ERYTHROCYTE [DISTWIDTH] IN BLOOD BY AUTOMATED COUNT: 13 % (ref 11.5–14.9)
FLUAV RNA RESP QL NAA+PROBE: NOT DETECTED
FLUBV RNA RESP QL NAA+PROBE: NOT DETECTED
GFR, ESTIMATED: 47 ML/MIN/1.73M2
GLUCOSE SERPL-MCNC: 433 MG/DL (ref 74–99)
GLUCOSE UR STRIP-MCNC: ABNORMAL MG/DL
HCT VFR BLD AUTO: 43.9 % (ref 36–46)
HGB BLD-MCNC: 14.9 G/DL (ref 12–16)
HGB UR QL STRIP.AUTO: NEGATIVE
IMM GRANULOCYTES # BLD AUTO: 0.05 K/UL (ref 0–0.3)
IMM GRANULOCYTES NFR BLD: 1 %
KETONES UR STRIP-MCNC: ABNORMAL MG/DL
LEUKOCYTE ESTERASE UR QL STRIP: NEGATIVE
LIPASE SERPL-CCNC: 82 U/L (ref 13–60)
LYMPHOCYTES NFR BLD: 1.71 K/UL (ref 1.1–3.7)
LYMPHOCYTES RELATIVE PERCENT: 21 % (ref 24–44)
MAGNESIUM SERPL-MCNC: 1.5 MG/DL (ref 1.6–2.4)
MCH RBC QN AUTO: 29.4 PG (ref 26–34)
MCHC RBC AUTO-ENTMCNC: 33.9 G/DL (ref 31–37)
MCV RBC AUTO: 86.8 FL (ref 80–100)
MONOCYTES NFR BLD: 0.59 K/UL (ref 0.1–1.2)
MONOCYTES NFR BLD: 7 % (ref 3–12)
NEUTROPHILS NFR BLD: 69 % (ref 36–66)
NEUTS SEG NFR BLD: 5.78 K/UL (ref 1.5–8.1)
NITRITE UR QL STRIP: NEGATIVE
NRBC BLD-RTO: 0 PER 100 WBC
PH UR STRIP: 6 [PH] (ref 5–8)
PLATELET # BLD AUTO: 206 K/UL (ref 150–450)
PMV BLD AUTO: 11.7 FL (ref 8–13.5)
POTASSIUM SERPL-SCNC: 3.7 MMOL/L (ref 3.7–5.3)
PROT SERPL-MCNC: 7.5 G/DL (ref 6.6–8.7)
PROT UR STRIP-MCNC: ABNORMAL MG/DL
RBC # BLD AUTO: 5.06 M/UL (ref 3.95–5.11)
RBC #/AREA URNS HPF: ABNORMAL /HPF
SARS-COV-2 RNA RESP QL NAA+PROBE: NOT DETECTED
SODIUM SERPL-SCNC: 132 MMOL/L (ref 136–145)
SOURCE: NORMAL
SP GR UR STRIP: 1.03 (ref 1–1.03)
SPECIMEN DESCRIPTION: NORMAL
TROPONIN I SERPL HS-MCNC: 15 NG/L (ref 0–14)
TROPONIN I SERPL HS-MCNC: 15 NG/L (ref 0–14)
UROBILINOGEN UR STRIP-ACNC: NORMAL EU/DL (ref 0–1)
WBC #/AREA URNS HPF: ABNORMAL /HPF
WBC OTHER # BLD: 8.3 K/UL (ref 3.5–11)

## 2025-07-25 PROCEDURE — 84484 ASSAY OF TROPONIN QUANT: CPT

## 2025-07-25 PROCEDURE — 96372 THER/PROPH/DIAG INJ SC/IM: CPT

## 2025-07-25 PROCEDURE — 99285 EMERGENCY DEPT VISIT HI MDM: CPT

## 2025-07-25 PROCEDURE — 83690 ASSAY OF LIPASE: CPT

## 2025-07-25 PROCEDURE — 96365 THER/PROPH/DIAG IV INF INIT: CPT

## 2025-07-25 PROCEDURE — 81001 URINALYSIS AUTO W/SCOPE: CPT

## 2025-07-25 PROCEDURE — 85025 COMPLETE CBC W/AUTO DIFF WBC: CPT

## 2025-07-25 PROCEDURE — 74176 CT ABD & PELVIS W/O CONTRAST: CPT

## 2025-07-25 PROCEDURE — 96375 TX/PRO/DX INJ NEW DRUG ADDON: CPT

## 2025-07-25 PROCEDURE — 36415 COLL VENOUS BLD VENIPUNCTURE: CPT

## 2025-07-25 PROCEDURE — 80053 COMPREHEN METABOLIC PANEL: CPT

## 2025-07-25 PROCEDURE — 87636 SARSCOV2 & INF A&B AMP PRB: CPT

## 2025-07-25 PROCEDURE — 71045 X-RAY EXAM CHEST 1 VIEW: CPT

## 2025-07-25 PROCEDURE — 6370000000 HC RX 637 (ALT 250 FOR IP): Performed by: EMERGENCY MEDICINE

## 2025-07-25 PROCEDURE — 83735 ASSAY OF MAGNESIUM: CPT

## 2025-07-25 PROCEDURE — 6360000002 HC RX W HCPCS: Performed by: EMERGENCY MEDICINE

## 2025-07-25 RX ORDER — KETOROLAC TROMETHAMINE 30 MG/ML
15 INJECTION, SOLUTION INTRAMUSCULAR; INTRAVENOUS ONCE
Status: COMPLETED | OUTPATIENT
Start: 2025-07-25 | End: 2025-07-25

## 2025-07-25 RX ORDER — MAGNESIUM SULFATE HEPTAHYDRATE 40 MG/ML
2000 INJECTION, SOLUTION INTRAVENOUS ONCE
Status: COMPLETED | OUTPATIENT
Start: 2025-07-25 | End: 2025-07-26

## 2025-07-25 RX ORDER — INSULIN LISPRO 100 [IU]/ML
5 INJECTION, SOLUTION INTRAVENOUS; SUBCUTANEOUS ONCE
Status: COMPLETED | OUTPATIENT
Start: 2025-07-25 | End: 2025-07-25

## 2025-07-25 RX ORDER — METOCLOPRAMIDE HYDROCHLORIDE 5 MG/ML
10 INJECTION INTRAMUSCULAR; INTRAVENOUS ONCE
Status: COMPLETED | OUTPATIENT
Start: 2025-07-25 | End: 2025-07-26

## 2025-07-25 RX ORDER — METOCLOPRAMIDE 10 MG/1
10 TABLET ORAL 3 TIMES DAILY PRN
Qty: 15 TABLET | Refills: 0 | Status: SHIPPED | OUTPATIENT
Start: 2025-07-25

## 2025-07-25 RX ORDER — ONDANSETRON 2 MG/ML
4 INJECTION INTRAMUSCULAR; INTRAVENOUS ONCE
Status: COMPLETED | OUTPATIENT
Start: 2025-07-25 | End: 2025-07-25

## 2025-07-25 RX ADMIN — ONDANSETRON 4 MG: 2 INJECTION, SOLUTION INTRAMUSCULAR; INTRAVENOUS at 21:58

## 2025-07-25 RX ADMIN — MAGNESIUM SULFATE HEPTAHYDRATE 2000 MG: 40 INJECTION, SOLUTION INTRAVENOUS at 23:03

## 2025-07-25 RX ADMIN — KETOROLAC TROMETHAMINE 15 MG: 30 INJECTION, SOLUTION INTRAMUSCULAR at 21:58

## 2025-07-25 RX ADMIN — INSULIN LISPRO 5 UNITS: 100 INJECTION, SOLUTION INTRAVENOUS; SUBCUTANEOUS at 23:20

## 2025-07-25 ASSESSMENT — ENCOUNTER SYMPTOMS
SHORTNESS OF BREATH: 1
ABDOMINAL PAIN: 1
CONSTIPATION: 1
VOMITING: 1
COUGH: 1
NAUSEA: 1

## 2025-07-25 ASSESSMENT — PAIN - FUNCTIONAL ASSESSMENT: PAIN_FUNCTIONAL_ASSESSMENT: NONE - DENIES PAIN

## 2025-07-26 VITALS
TEMPERATURE: 98 F | RESPIRATION RATE: 22 BRPM | BODY MASS INDEX: 41.59 KG/M2 | HEIGHT: 62 IN | WEIGHT: 226 LBS | DIASTOLIC BLOOD PRESSURE: 56 MMHG | HEART RATE: 83 BPM | SYSTOLIC BLOOD PRESSURE: 136 MMHG | OXYGEN SATURATION: 94 %

## 2025-07-26 PROCEDURE — 6360000002 HC RX W HCPCS: Performed by: EMERGENCY MEDICINE

## 2025-07-26 PROCEDURE — 96366 THER/PROPH/DIAG IV INF ADDON: CPT

## 2025-07-26 PROCEDURE — 96375 TX/PRO/DX INJ NEW DRUG ADDON: CPT

## 2025-07-26 RX ADMIN — METOCLOPRAMIDE 10 MG: 5 INJECTION, SOLUTION INTRAMUSCULAR; INTRAVENOUS at 00:06

## 2025-07-26 ASSESSMENT — PAIN - FUNCTIONAL ASSESSMENT: PAIN_FUNCTIONAL_ASSESSMENT: NONE - DENIES PAIN

## 2025-07-26 NOTE — ED PROVIDER NOTES
07/25/2025 11:59:11 PM   DISPOSITION CONDITION Stable           OUTPATIENT FOLLOW UP THE PATIENT:  Nuzhat Ramos, DTR  [address] [facility] [address]  [phone]    Schedule an appointment as soon as possible for a visit         DO Miladys Choi, Miladys CORNEJO DO  07/26/25 0218

## 2025-07-26 NOTE — DISCHARGE INSTR - COC
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, or end stage renal disease (HCC) I13.2    Muscle weakness (generalized) M62.81    Other specified diseases of blood and blood-forming organs D75.89    Presence of intraocular lens Z96.1    Tinnitus, unspecified ear H93.19    Unspecified hearing loss, bilateral H91.93    STEMI (ST elevation myocardial infarction) ([facility]) I21.3    Acquired absence of other specified parts of digestive tract Z90.49    Long term (current) use of anticoagulants Z79.01    Nonrheumatic mitral (valve) insufficiency I34.0    Partially vaccinated for covid-19 Z28.311    History of colonic polyps Z86.0100    Personal history of pneumonia (recurrent) Z87.01    S/P coronary artery stent placement Z95.5    Personal history of urinary calculi Z87.442    Weakness R53.1    History of ST elevation myocardial infarction (STEMI) I25.2    Coronary artery disease involving native coronary artery of native heart without angina pectoris I25.10    Secondary hypercoagulable state D68.69    Familial hypercholesterolemia E78.01    Claudication I73.9    Type 2 diabetes mellitus with hyperglycemia ([facility]) E11.65    Hyperglycemia R73.9    CAD S/P percutaneous coronary angioplasty I25.10, Z98.61    TIA (transient ischemic attack) G45.9    Facial pain, acute R51.9    Type 2 diabetes mellitus with hyperglycemia, with long-term current use of insulin ([facility]) E11.65, Z79.4    Uncontrolled blood glucose R73.09    Elevated erythrocyte sedimentation rate R70.0    Elevated C-reactive protein (CRP) R79.82    Chest pain R07.9    Sprain of temporomandibular joint or ligament S03.40XA    Neck pain M54.2    Unstable angina ([facility]) I20.0    Syncope and collapse R55    Moderate malnutrition E44.0    S/P cardiac catheterization Z98.890    Coronary artery disease due to calcified coronary lesion I25.10, I25.84    S/P angioplasty with stent Z95.820       Isolation/Infection:

## 2025-07-26 NOTE — ED NOTES
Report given to REUBEN Lynn from ED.   Report method in person.   The following was reviewed with receiving RN:   Current vital signs:  BP (!) 172/68   Pulse 89   Temp 98 °F (36.7 °C) (Oral)   Resp 18   Ht 1.575 m (5' 2\")   Wt 102.5 kg (226 lb)   LMP  (LMP Unknown)   SpO2 92%   BMI 41.34 kg/m²                MEWS Score: 1     Any medication or safety alerts were reviewed. Any pending diagnostics and notifications were also reviewed, as well as any safety concerns or issues, abnormal labs, abnormal imaging, and abnormal assessment findings. Questions were answered.

## 2025-07-28 LAB
EKG ATRIAL RATE: 82 BPM
EKG P AXIS: 49 DEGREES
EKG P-R INTERVAL: 128 MS
EKG Q-T INTERVAL: 336 MS
EKG QRS DURATION: 74 MS
EKG QTC CALCULATION (BAZETT): 392 MS
EKG R AXIS: -50 DEGREES
EKG T AXIS: 116 DEGREES
EKG VENTRICULAR RATE: 82 BPM

## 2025-07-29 ENCOUNTER — HOSPITAL ENCOUNTER (OUTPATIENT)
Age: 75
Setting detail: OBSERVATION
Discharge: HOME | End: 2025-08-02
Attending: EMERGENCY MEDICINE
Payer: MEDICARE

## 2025-07-29 ENCOUNTER — APPOINTMENT (OUTPATIENT)
Dept: GENERAL RADIOLOGY | Age: 75
End: 2025-07-29
Payer: MEDICARE

## 2025-07-29 DIAGNOSIS — M79.662 TENDERNESS OF LEFT CALF: ICD-10-CM

## 2025-07-29 DIAGNOSIS — R73.9 HYPERGLYCEMIA: ICD-10-CM

## 2025-07-29 DIAGNOSIS — R07.9 CHEST PAIN, UNSPECIFIED TYPE: Primary | ICD-10-CM

## 2025-07-29 DIAGNOSIS — N17.9 AKI (ACUTE KIDNEY INJURY): ICD-10-CM

## 2025-07-29 PROCEDURE — 99285 EMERGENCY DEPT VISIT HI MDM: CPT

## 2025-07-29 PROCEDURE — 93005 ELECTROCARDIOGRAM TRACING: CPT | Performed by: STUDENT IN AN ORGANIZED HEALTH CARE EDUCATION/TRAINING PROGRAM

## 2025-07-29 PROCEDURE — 71045 X-RAY EXAM CHEST 1 VIEW: CPT

## 2025-07-29 ASSESSMENT — ENCOUNTER SYMPTOMS
COUGH: 0
NAUSEA: 1
VOMITING: 0
SHORTNESS OF BREATH: 1
ABDOMINAL PAIN: 0

## 2025-07-30 ENCOUNTER — APPOINTMENT (OUTPATIENT)
Dept: VASCULAR LAB | Age: 75
End: 2025-07-30
Payer: MEDICARE

## 2025-07-30 LAB
ANION GAP SERPL CALCULATED.3IONS-SCNC: 15 MMOL/L (ref 9–16)
BASOPHILS # BLD: 0.07 K/UL (ref 0–0.2)
BASOPHILS NFR BLD: 1 % (ref 0–2)
BUN SERPL-MCNC: 29 MG/DL (ref 8–23)
CALCIUM SERPL-MCNC: 9.3 MG/DL (ref 8.6–10.4)
CHLORIDE SERPL-SCNC: 92 MMOL/L (ref 98–107)
CHP ED QC CHECK: NORMAL
CO2 SERPL-SCNC: 25 MMOL/L (ref 20–31)
CREAT SERPL-MCNC: 1.6 MG/DL (ref 0.7–1.2)
EKG ATRIAL RATE: 70 BPM
EKG ATRIAL RATE: 87 BPM
EKG P AXIS: 64 DEGREES
EKG P AXIS: 66 DEGREES
EKG P-R INTERVAL: 142 MS
EKG P-R INTERVAL: 154 MS
EKG Q-T INTERVAL: 322 MS
EKG Q-T INTERVAL: 354 MS
EKG QRS DURATION: 74 MS
EKG QRS DURATION: 78 MS
EKG QTC CALCULATION (BAZETT): 382 MS
EKG QTC CALCULATION (BAZETT): 387 MS
EKG R AXIS: -48 DEGREES
EKG R AXIS: -48 DEGREES
EKG T AXIS: 90 DEGREES
EKG T AXIS: 95 DEGREES
EKG VENTRICULAR RATE: 70 BPM
EKG VENTRICULAR RATE: 87 BPM
EOSINOPHIL # BLD: 0.14 K/UL (ref 0–0.44)
EOSINOPHILS RELATIVE PERCENT: 2 % (ref 0–4)
ERYTHROCYTE [DISTWIDTH] IN BLOOD BY AUTOMATED COUNT: 12.7 % (ref 11.5–14.9)
GFR, ESTIMATED: 33 ML/MIN/1.73M2
GLUCOSE BLD-MCNC: 230 MG/DL (ref 65–105)
GLUCOSE BLD-MCNC: 244 MG/DL (ref 65–105)
GLUCOSE BLD-MCNC: 256 MG/DL (ref 65–105)
GLUCOSE BLD-MCNC: 274 MG/DL (ref 65–105)
GLUCOSE BLD-MCNC: 317 MG/DL (ref 65–105)
GLUCOSE BLD-MCNC: 468 MG/DL
GLUCOSE BLD-MCNC: 468 MG/DL (ref 65–105)
GLUCOSE SERPL-MCNC: 532 MG/DL (ref 74–99)
HCT VFR BLD AUTO: 42.2 % (ref 36–46)
HGB BLD-MCNC: 14 G/DL (ref 12–16)
IMM GRANULOCYTES # BLD AUTO: 0.03 K/UL (ref 0–0.3)
IMM GRANULOCYTES NFR BLD: 0 %
LYMPHOCYTES NFR BLD: 1.63 K/UL (ref 1.1–3.7)
LYMPHOCYTES RELATIVE PERCENT: 23 % (ref 24–44)
MCH RBC QN AUTO: 29.4 PG (ref 26–34)
MCHC RBC AUTO-ENTMCNC: 33.2 G/DL (ref 31–37)
MCV RBC AUTO: 88.5 FL (ref 80–100)
MONOCYTES NFR BLD: 0.68 K/UL (ref 0.1–1.2)
MONOCYTES NFR BLD: 10 % (ref 3–12)
NEUTROPHILS NFR BLD: 64 % (ref 36–66)
NEUTS SEG NFR BLD: 4.62 K/UL (ref 1.5–8.1)
NRBC BLD-RTO: 0 PER 100 WBC
PLATELET # BLD AUTO: 180 K/UL (ref 150–450)
PMV BLD AUTO: 11.7 FL (ref 8–13.5)
POTASSIUM SERPL-SCNC: 4.3 MMOL/L (ref 3.7–5.3)
RBC # BLD AUTO: 4.77 M/UL (ref 3.95–5.11)
SODIUM SERPL-SCNC: 132 MMOL/L (ref 136–145)
TROPONIN I SERPL HS-MCNC: 16 NG/L (ref 0–14)
TROPONIN I SERPL HS-MCNC: 16 NG/L (ref 0–14)
TROPONIN I SERPL HS-MCNC: 17 NG/L (ref 0–14)
WBC OTHER # BLD: 7.2 K/UL (ref 3.5–11)

## 2025-07-30 PROCEDURE — 93005 ELECTROCARDIOGRAM TRACING: CPT

## 2025-07-30 PROCEDURE — 6370000000 HC RX 637 (ALT 250 FOR IP): Performed by: STUDENT IN AN ORGANIZED HEALTH CARE EDUCATION/TRAINING PROGRAM

## 2025-07-30 PROCEDURE — 94640 AIRWAY INHALATION TREATMENT: CPT

## 2025-07-30 PROCEDURE — 93971 EXTREMITY STUDY: CPT

## 2025-07-30 PROCEDURE — 6370000000 HC RX 637 (ALT 250 FOR IP)

## 2025-07-30 PROCEDURE — G0378 HOSPITAL OBSERVATION PER HR: HCPCS

## 2025-07-30 PROCEDURE — 94761 N-INVAS EAR/PLS OXIMETRY MLT: CPT

## 2025-07-30 PROCEDURE — 93010 ELECTROCARDIOGRAM REPORT: CPT | Performed by: INTERNAL MEDICINE

## 2025-07-30 PROCEDURE — 96361 HYDRATE IV INFUSION ADD-ON: CPT

## 2025-07-30 PROCEDURE — 82947 ASSAY GLUCOSE BLOOD QUANT: CPT

## 2025-07-30 PROCEDURE — 94760 N-INVAS EAR/PLS OXIMETRY 1: CPT

## 2025-07-30 PROCEDURE — 2500000003 HC RX 250 WO HCPCS

## 2025-07-30 PROCEDURE — 2580000003 HC RX 258: Performed by: STUDENT IN AN ORGANIZED HEALTH CARE EDUCATION/TRAINING PROGRAM

## 2025-07-30 PROCEDURE — 80048 BASIC METABOLIC PNL TOTAL CA: CPT

## 2025-07-30 PROCEDURE — 85025 COMPLETE CBC W/AUTO DIFF WBC: CPT

## 2025-07-30 PROCEDURE — 99223 1ST HOSP IP/OBS HIGH 75: CPT

## 2025-07-30 PROCEDURE — 2700000000 HC OXYGEN THERAPY PER DAY

## 2025-07-30 PROCEDURE — 84484 ASSAY OF TROPONIN QUANT: CPT

## 2025-07-30 PROCEDURE — 36415 COLL VENOUS BLD VENIPUNCTURE: CPT

## 2025-07-30 RX ORDER — BUDESONIDE AND FORMOTEROL FUMARATE DIHYDRATE 160; 4.5 UG/1; UG/1
1 AEROSOL RESPIRATORY (INHALATION)
Status: DISCONTINUED | OUTPATIENT
Start: 2025-07-30 | End: 2025-08-02 | Stop reason: HOSPADM

## 2025-07-30 RX ORDER — NITROGLYCERIN 0.4 MG/1
0.4 TABLET SUBLINGUAL PRN
Status: DISCONTINUED | OUTPATIENT
Start: 2025-07-30 | End: 2025-08-02 | Stop reason: HOSPADM

## 2025-07-30 RX ORDER — POTASSIUM CHLORIDE 1500 MG/1
40 TABLET, EXTENDED RELEASE ORAL PRN
Status: DISCONTINUED | OUTPATIENT
Start: 2025-07-30 | End: 2025-08-02 | Stop reason: HOSPADM

## 2025-07-30 RX ORDER — GABAPENTIN 100 MG/1
100 CAPSULE ORAL 2 TIMES DAILY
Status: DISCONTINUED | OUTPATIENT
Start: 2025-07-30 | End: 2025-08-02 | Stop reason: HOSPADM

## 2025-07-30 RX ORDER — ONDANSETRON 2 MG/ML
4 INJECTION INTRAMUSCULAR; INTRAVENOUS EVERY 6 HOURS PRN
Status: DISCONTINUED | OUTPATIENT
Start: 2025-07-30 | End: 2025-08-02 | Stop reason: HOSPADM

## 2025-07-30 RX ORDER — NITROGLYCERIN 0.4 MG/1
0.4 TABLET SUBLINGUAL ONCE
Status: COMPLETED | OUTPATIENT
Start: 2025-07-30 | End: 2025-07-30

## 2025-07-30 RX ORDER — ISOSORBIDE MONONITRATE 60 MG/1
60 TABLET, EXTENDED RELEASE ORAL DAILY
Status: DISCONTINUED | OUTPATIENT
Start: 2025-07-30 | End: 2025-07-31

## 2025-07-30 RX ORDER — 0.9 % SODIUM CHLORIDE 0.9 %
500 INTRAVENOUS SOLUTION INTRAVENOUS ONCE
Status: COMPLETED | OUTPATIENT
Start: 2025-07-30 | End: 2025-07-30

## 2025-07-30 RX ORDER — SODIUM CHLORIDE 0.9 % (FLUSH) 0.9 %
5-40 SYRINGE (ML) INJECTION PRN
Status: DISCONTINUED | OUTPATIENT
Start: 2025-07-30 | End: 2025-08-02 | Stop reason: HOSPADM

## 2025-07-30 RX ORDER — METOPROLOL TARTRATE 25 MG/1
12.5 TABLET, FILM COATED ORAL 2 TIMES DAILY
Status: DISCONTINUED | OUTPATIENT
Start: 2025-07-30 | End: 2025-08-02 | Stop reason: HOSPADM

## 2025-07-30 RX ORDER — INSULIN GLARGINE 100 [IU]/ML
50 INJECTION, SOLUTION SUBCUTANEOUS EVERY MORNING
Status: DISCONTINUED | OUTPATIENT
Start: 2025-07-30 | End: 2025-08-02 | Stop reason: HOSPADM

## 2025-07-30 RX ORDER — IPRATROPIUM BROMIDE AND ALBUTEROL SULFATE 2.5; .5 MG/3ML; MG/3ML
1 SOLUTION RESPIRATORY (INHALATION) 4 TIMES DAILY PRN
Status: DISCONTINUED | OUTPATIENT
Start: 2025-07-30 | End: 2025-08-02 | Stop reason: HOSPADM

## 2025-07-30 RX ORDER — ATORVASTATIN CALCIUM 80 MG/1
80 TABLET, FILM COATED ORAL DAILY
Status: DISCONTINUED | OUTPATIENT
Start: 2025-07-30 | End: 2025-08-02 | Stop reason: HOSPADM

## 2025-07-30 RX ORDER — RANOLAZINE 500 MG/1
1000 TABLET, EXTENDED RELEASE ORAL 2 TIMES DAILY
Status: DISCONTINUED | OUTPATIENT
Start: 2025-07-30 | End: 2025-08-02 | Stop reason: HOSPADM

## 2025-07-30 RX ORDER — ACETAMINOPHEN 325 MG/1
650 TABLET ORAL EVERY 6 HOURS PRN
Status: DISCONTINUED | OUTPATIENT
Start: 2025-07-30 | End: 2025-08-02 | Stop reason: HOSPADM

## 2025-07-30 RX ORDER — POLYETHYLENE GLYCOL 3350 17 G/17G
17 POWDER, FOR SOLUTION ORAL DAILY PRN
Status: DISCONTINUED | OUTPATIENT
Start: 2025-07-30 | End: 2025-08-02 | Stop reason: HOSPADM

## 2025-07-30 RX ORDER — DEXTROSE MONOHYDRATE 100 MG/ML
INJECTION, SOLUTION INTRAVENOUS CONTINUOUS PRN
Status: DISCONTINUED | OUTPATIENT
Start: 2025-07-30 | End: 2025-07-30 | Stop reason: SDUPTHER

## 2025-07-30 RX ORDER — MAGNESIUM SULFATE HEPTAHYDRATE 40 MG/ML
2000 INJECTION, SOLUTION INTRAVENOUS PRN
Status: DISCONTINUED | OUTPATIENT
Start: 2025-07-30 | End: 2025-08-02 | Stop reason: HOSPADM

## 2025-07-30 RX ORDER — PANTOPRAZOLE SODIUM 40 MG/1
40 TABLET, DELAYED RELEASE ORAL 2 TIMES DAILY
Status: DISCONTINUED | OUTPATIENT
Start: 2025-07-30 | End: 2025-08-02 | Stop reason: HOSPADM

## 2025-07-30 RX ORDER — ALOGLIPTIN 12.5 MG/1
12.5 TABLET, FILM COATED ORAL DAILY
Status: DISCONTINUED | OUTPATIENT
Start: 2025-07-30 | End: 2025-07-30

## 2025-07-30 RX ORDER — ASPIRIN 81 MG/1
324 TABLET, CHEWABLE ORAL ONCE
Status: COMPLETED | OUTPATIENT
Start: 2025-07-30 | End: 2025-07-30

## 2025-07-30 RX ORDER — INSULIN GLARGINE 100 [IU]/ML
45 INJECTION, SOLUTION SUBCUTANEOUS NIGHTLY
Status: DISCONTINUED | OUTPATIENT
Start: 2025-07-30 | End: 2025-08-02 | Stop reason: HOSPADM

## 2025-07-30 RX ORDER — ESCITALOPRAM OXALATE 20 MG/1
20 TABLET ORAL DAILY
Status: DISCONTINUED | OUTPATIENT
Start: 2025-07-30 | End: 2025-08-02 | Stop reason: HOSPADM

## 2025-07-30 RX ORDER — ONDANSETRON 4 MG/1
4 TABLET, ORALLY DISINTEGRATING ORAL EVERY 8 HOURS PRN
Status: DISCONTINUED | OUTPATIENT
Start: 2025-07-30 | End: 2025-08-02 | Stop reason: HOSPADM

## 2025-07-30 RX ORDER — ALBUTEROL SULFATE 0.83 MG/ML
SOLUTION RESPIRATORY (INHALATION)
Status: DISPENSED
Start: 2025-07-30 | End: 2025-07-30

## 2025-07-30 RX ORDER — MIDODRINE HYDROCHLORIDE 2.5 MG/1
2.5 TABLET ORAL
Status: DISCONTINUED | OUTPATIENT
Start: 2025-07-30 | End: 2025-08-02 | Stop reason: HOSPADM

## 2025-07-30 RX ORDER — OXYBUTYNIN CHLORIDE 5 MG/1
5 TABLET, EXTENDED RELEASE ORAL NIGHTLY
Status: DISCONTINUED | OUTPATIENT
Start: 2025-07-30 | End: 2025-07-31

## 2025-07-30 RX ORDER — INSULIN GLARGINE 100 [IU]/ML
50 INJECTION, SOLUTION SUBCUTANEOUS EVERY MORNING
Status: DISCONTINUED | OUTPATIENT
Start: 2025-07-30 | End: 2025-07-30

## 2025-07-30 RX ORDER — DEXTROSE MONOHYDRATE 100 MG/ML
INJECTION, SOLUTION INTRAVENOUS CONTINUOUS PRN
Status: DISCONTINUED | OUTPATIENT
Start: 2025-07-30 | End: 2025-08-02 | Stop reason: HOSPADM

## 2025-07-30 RX ORDER — TOPIRAMATE 100 MG/1
100 TABLET, FILM COATED ORAL DAILY
Status: DISCONTINUED | OUTPATIENT
Start: 2025-07-30 | End: 2025-08-02 | Stop reason: HOSPADM

## 2025-07-30 RX ORDER — INSULIN LISPRO 100 [IU]/ML
5 INJECTION, SOLUTION INTRAVENOUS; SUBCUTANEOUS ONCE
Status: COMPLETED | OUTPATIENT
Start: 2025-07-30 | End: 2025-07-30

## 2025-07-30 RX ORDER — INSULIN LISPRO 100 [IU]/ML
0-8 INJECTION, SOLUTION INTRAVENOUS; SUBCUTANEOUS
Status: DISCONTINUED | OUTPATIENT
Start: 2025-07-30 | End: 2025-08-02 | Stop reason: HOSPADM

## 2025-07-30 RX ORDER — ASPIRIN 81 MG/1
81 TABLET, CHEWABLE ORAL DAILY
Status: DISCONTINUED | OUTPATIENT
Start: 2025-07-30 | End: 2025-08-02 | Stop reason: HOSPADM

## 2025-07-30 RX ORDER — INSULIN GLARGINE 100 [IU]/ML
45 INJECTION, SOLUTION SUBCUTANEOUS NIGHTLY
Status: DISCONTINUED | OUTPATIENT
Start: 2025-07-30 | End: 2025-07-30

## 2025-07-30 RX ORDER — POTASSIUM CHLORIDE 7.45 MG/ML
10 INJECTION INTRAVENOUS PRN
Status: DISCONTINUED | OUTPATIENT
Start: 2025-07-30 | End: 2025-08-02 | Stop reason: HOSPADM

## 2025-07-30 RX ORDER — TICAGRELOR 90 MG/1
90 TABLET, FILM COATED ORAL 2 TIMES DAILY
Status: DISCONTINUED | OUTPATIENT
Start: 2025-07-30 | End: 2025-08-02 | Stop reason: HOSPADM

## 2025-07-30 RX ORDER — ACETAMINOPHEN 650 MG/1
650 SUPPOSITORY RECTAL EVERY 6 HOURS PRN
Status: DISCONTINUED | OUTPATIENT
Start: 2025-07-30 | End: 2025-08-02 | Stop reason: HOSPADM

## 2025-07-30 RX ORDER — SODIUM CHLORIDE 9 MG/ML
INJECTION, SOLUTION INTRAVENOUS PRN
Status: DISCONTINUED | OUTPATIENT
Start: 2025-07-30 | End: 2025-08-02 | Stop reason: HOSPADM

## 2025-07-30 RX ADMIN — PANTOPRAZOLE SODIUM 40 MG: 40 TABLET, DELAYED RELEASE ORAL at 08:38

## 2025-07-30 RX ADMIN — OXYBUTYNIN CHLORIDE 5 MG: 5 TABLET, EXTENDED RELEASE ORAL at 21:20

## 2025-07-30 RX ADMIN — TICAGRELOR 90 MG: 90 TABLET ORAL at 21:19

## 2025-07-30 RX ADMIN — ISOSORBIDE MONONITRATE 60 MG: 60 TABLET, EXTENDED RELEASE ORAL at 08:39

## 2025-07-30 RX ADMIN — ESCITALOPRAM OXALATE 20 MG: 20 TABLET, FILM COATED ORAL at 08:39

## 2025-07-30 RX ADMIN — ASPIRIN 81 MG: 81 TABLET, CHEWABLE ORAL at 12:26

## 2025-07-30 RX ADMIN — INSULIN LISPRO 6 UNITS: 100 INJECTION, SOLUTION INTRAVENOUS; SUBCUTANEOUS at 21:23

## 2025-07-30 RX ADMIN — NITROGLYCERIN 0.4 MG: 0.4 TABLET SUBLINGUAL at 02:00

## 2025-07-30 RX ADMIN — RANOLAZINE 1000 MG: 500 TABLET, EXTENDED RELEASE ORAL at 21:20

## 2025-07-30 RX ADMIN — BUDESONIDE AND FORMOTEROL FUMARATE DIHYDRATE 1 PUFF: 160; 4.5 AEROSOL RESPIRATORY (INHALATION) at 07:43

## 2025-07-30 RX ADMIN — INSULIN LISPRO 5 UNITS: 100 INJECTION, SOLUTION INTRAVENOUS; SUBCUTANEOUS at 01:47

## 2025-07-30 RX ADMIN — MIDODRINE HYDROCHLORIDE 2.5 MG: 2.5 TABLET ORAL at 17:55

## 2025-07-30 RX ADMIN — INSULIN GLARGINE 50 UNITS: 100 INJECTION, SOLUTION SUBCUTANEOUS at 05:56

## 2025-07-30 RX ADMIN — ALOGLIPTIN 12.5 MG: 12.5 TABLET, FILM COATED ORAL at 08:38

## 2025-07-30 RX ADMIN — ONDANSETRON 4 MG: 4 TABLET, ORALLY DISINTEGRATING ORAL at 11:12

## 2025-07-30 RX ADMIN — METOPROLOL TARTRATE 12.5 MG: 25 TABLET, FILM COATED ORAL at 21:20

## 2025-07-30 RX ADMIN — ACETAMINOPHEN 650 MG: 325 TABLET ORAL at 21:20

## 2025-07-30 RX ADMIN — ASPIRIN 324 MG: 81 TABLET, CHEWABLE ORAL at 01:59

## 2025-07-30 RX ADMIN — INSULIN GLARGINE 45 UNITS: 100 INJECTION, SOLUTION SUBCUTANEOUS at 21:23

## 2025-07-30 RX ADMIN — INSULIN LISPRO 4 UNITS: 100 INJECTION, SOLUTION INTRAVENOUS; SUBCUTANEOUS at 18:16

## 2025-07-30 RX ADMIN — TOPIRAMATE 100 MG: 100 TABLET, FILM COATED ORAL at 21:20

## 2025-07-30 RX ADMIN — MIDODRINE HYDROCHLORIDE 2.5 MG: 2.5 TABLET ORAL at 08:38

## 2025-07-30 RX ADMIN — TICAGRELOR 90 MG: 90 TABLET ORAL at 08:39

## 2025-07-30 RX ADMIN — NITROGLYCERIN 0.4 MG: 0.4 TABLET SUBLINGUAL at 11:05

## 2025-07-30 RX ADMIN — INSULIN LISPRO 2 UNITS: 100 INJECTION, SOLUTION INTRAVENOUS; SUBCUTANEOUS at 05:56

## 2025-07-30 RX ADMIN — MIDODRINE HYDROCHLORIDE 2.5 MG: 2.5 TABLET ORAL at 11:12

## 2025-07-30 RX ADMIN — GABAPENTIN 100 MG: 100 CAPSULE ORAL at 08:38

## 2025-07-30 RX ADMIN — INSULIN LISPRO 2 UNITS: 100 INJECTION, SOLUTION INTRAVENOUS; SUBCUTANEOUS at 12:26

## 2025-07-30 RX ADMIN — RANOLAZINE 1000 MG: 500 TABLET, EXTENDED RELEASE ORAL at 08:38

## 2025-07-30 RX ADMIN — PANTOPRAZOLE SODIUM 40 MG: 40 TABLET, DELAYED RELEASE ORAL at 21:20

## 2025-07-30 RX ADMIN — SODIUM CHLORIDE, PRESERVATIVE FREE 10 ML: 5 INJECTION INTRAVENOUS at 08:40

## 2025-07-30 RX ADMIN — Medication 3 MG: at 21:19

## 2025-07-30 RX ADMIN — ACETAMINOPHEN 650 MG: 325 TABLET ORAL at 11:12

## 2025-07-30 RX ADMIN — GABAPENTIN 100 MG: 100 CAPSULE ORAL at 21:19

## 2025-07-30 RX ADMIN — SODIUM CHLORIDE 500 ML: 0.9 INJECTION, SOLUTION INTRAVENOUS at 01:47

## 2025-07-30 RX ADMIN — METOPROLOL TARTRATE 12.5 MG: 25 TABLET, FILM COATED ORAL at 08:38

## 2025-07-30 RX ADMIN — ATORVASTATIN CALCIUM 80 MG: 80 TABLET, FILM COATED ORAL at 08:38

## 2025-07-30 ASSESSMENT — PAIN DESCRIPTION - ORIENTATION
ORIENTATION: MID
ORIENTATION: MID
ORIENTATION: LEFT
ORIENTATION: LEFT

## 2025-07-30 ASSESSMENT — PAIN DESCRIPTION - DESCRIPTORS
DESCRIPTORS: ACHING;DULL
DESCRIPTORS: ACHING;DULL

## 2025-07-30 ASSESSMENT — PAIN DESCRIPTION - LOCATION
LOCATION: CHEST

## 2025-07-30 ASSESSMENT — ENCOUNTER SYMPTOMS
VOMITING: 0
CHOKING: 0
CHEST TIGHTNESS: 1
STRIDOR: 0
WHEEZING: 0
ABDOMINAL PAIN: 0
SHORTNESS OF BREATH: 1
NAUSEA: 1
COUGH: 0
DIARRHEA: 0

## 2025-07-30 ASSESSMENT — PAIN SCALES - GENERAL
PAINLEVEL_OUTOF10: 7
PAINLEVEL_OUTOF10: 7
PAINLEVEL_OUTOF10: 6
PAINLEVEL_OUTOF10: 7
PAINLEVEL_OUTOF10: 8
PAINLEVEL_OUTOF10: 8
PAINLEVEL_OUTOF10: 10

## 2025-07-30 ASSESSMENT — HEART SCORE: ECG: NORMAL

## 2025-07-30 ASSESSMENT — PAIN - FUNCTIONAL ASSESSMENT: PAIN_FUNCTIONAL_ASSESSMENT: 0-10

## 2025-07-30 NOTE — ACP (ADVANCE CARE PLANNING)
Advance Care Planning     Advance Care Planning Activator (Inpatient)  Conversation Note      Date of ACP Conversation: 7/30/2025     Conversation Conducted with: Patient with Decision Making Capacity    ACP Activator: Vickie Morrell RN        Health Care Decision Maker:     Current Designated Health Care Decision Maker:     Amairani Cotto, [phone].     Pt. States \"Jaycee, is her decision maker, for she is Legally  & Hasn't had contact w/ SO, for the last 35-40 years\".       Care Preferences    Ventilation:  \"If you were in your present state of health and suddenly became very ill and were unable to breathe on your own, what would your preference be about the use of a ventilator (breathing machine) if it were available to you?\"      Would the patient desire the use of ventilator (breathing machine)?: yes    \"If your health worsens and it becomes clear that your chance of recovery is unlikely, what would your preference be about the use of a ventilator (breathing machine) if it were available to you?\"     Would the patient desire the use of ventilator (breathing machine)?: Yes      Resuscitation  \"CPR works best to restart the heart when there is a sudden event, like a heart attack, in someone who is otherwise healthy. Unfortunately, CPR does not typically restart the heart for people who have serious health conditions or who are very sick.\"    \"In the event your heart stopped as a result of an underlying serious health condition, would you want attempts to be made to restart your heart (answer \"yes\" for attempt to resuscitate) or would you prefer a natural death (answer \"no\" for do not attempt to resuscitate)?\" yes       [] Yes   [] No   Educated Patient / Decision Maker regarding differences between Advance Directives and portable DNR orders.    Length of ACP Conversation in minutes:      Conversation Outcomes:  ACP discussion completed    Follow-up plan:    [] Schedule follow-up  conversation to continue planning  [] Referred individual to Provider for additional questions/concerns   [] Advised patient/agent/surrogate to review completed ACP document and update if needed with changes in condition, patient preferences or care setting    [] This note routed to one or more involved healthcare providers

## 2025-07-30 NOTE — ED NOTES
TRANSFER - OUT REPORT:    Verbal report given to Kushal ALEXIS on Mariangel Abreu  being transferred to 2123 for routine progression of patient care       Report consisted of patient's Situation, Background, Assessment and   Recommendations(SBAR).     Information from the following report(s) ED Encounter Summary was reviewed with the receiving nurse.    Laurel Fall Assessment:    Presents to emergency department  because of falls (Syncope, seizure, or loss of consciousness): No  Age > 70: Yes  Altered Mental Status, Intoxication with alcohol or substance confusion (Disorientation, impaired judgment, poor safety awaremess, or inability to follow instructions): No  Impaired Mobility: Ambulates or transfers with assistive devices or assistance; Unable to ambulate or transer.: Yes  Nursing Judgement: Yes          Lines:   Peripheral IV 07/30/25 Left;Anterior Forearm (Active)   Site Assessment Clean, dry & intact 07/30/25 0049   Line Status Blood return noted 07/30/25 0049   Phlebitis Assessment No symptoms 07/30/25 0049   Infiltration Assessment 0 07/30/25 0049   Dressing Status Clean, dry & intact 07/30/25 0049   Dressing Type Transparent 07/30/25 0049   Dressing Intervention New 07/30/25 0049        Opportunity for questions and clarification was provided.      Patient transported with:  O2 @ 2lpm

## 2025-07-30 NOTE — ED NOTES
Pt arriving today via EMS presenting with chief complaint of L sided chest pain, SOB, and dizziness. Patient states pain began around 10pm this evening. Per EMS patient self- administered 2x nitros prior to EMS arrival. Per EMS, pt refused Aspirin administration due to previously being told by her MD. Patient states she has hx of COPD and emphysema, and has oxygen at home 2L prn.

## 2025-07-30 NOTE — ED PROVIDER NOTES
EMERGENCY DEPARTMENT ENCOUNTER   ATTENDING ATTESTATION     Pt Name: Mariangel Abreu  MRN: [id number]  Birthdate 1950  Date of evaluation: 7/30/25       Mariagnel Abreu is a 74 y.o. female who presents with Chest Pain and Shortness of Breath      MDM:   Chest pain and some shortness of breath.  It improved significantly.  History of CAD and CHF.  Admitting for ACS.  Do not suspect aortic dissection or PE.    Vitals Reviewed:    Vitals:    07/29/25 2307   BP: (!) 156/92   Pulse: 88   Resp: 20   Temp: 98.1 °F (36.7 °C)   TempSrc: Oral   SpO2: 94%   Weight: 102.5 kg (226 lb)   Height: 1.575 m (5' 2\")       Initial Pain Score:    Last Pain Score:        I personally saw and examined the patient. I have reviewed and agree with the resident's findings including all diagnostic interpretations, and treatment plans as written. I was present for the key portions of any procedures performed.    Tommy Talavera MD  Attending Emergency Physician            Tommy Talavera MD  07/30/25 0059    
agreed to admit. [CH]      ED Course User Index  [CH] Shaq Sanchez MD       PROCEDURES:      CONSULTS:  IP CONSULT TO INTERNAL MEDICINE    CRITICAL CARE:  There was significant risk of life threatening deterioration of patient's condition requiring my direct management. Critical care time 0 minutes, excluding any documented procedures.    FINAL IMPRESSION      1. Chest pain, unspecified type    2. YAJAIRA (acute kidney injury)    3. Hyperglycemia          DISPOSITION / PLAN     DISPOSITION Decision To Admit 07/30/2025 01:44:14 AM   DISPOSITION CONDITION Stable           PATIENT REFERRED TO:  No follow-up provider specified.    DISCHARGE MEDICATIONS:  New Prescriptions    No medications on file       Shaq Sanchez MD  Emergency Medicine Resident    (Please note that portions of thisnote were completed with a voice recognition program.  Efforts were made to edit the dictations but occasionally words are mis-transcribed.)

## 2025-07-30 NOTE — CARE COORDINATION
Case Management Assessment  Initial Evaluation    Date/Time of Evaluation: 7/30/2025 9:58 AM  Assessment Completed by: Vickie Morrell RN    If patient is discharged prior to next notation, then this note serves as note for discharge by case management.    Patient Name: Mariangel Abreu                   YOB: 1950  Diagnosis: Chest pain [R07.9]  Hyperglycemia [R73.9]  YAJAIRA (acute kidney injury) [N17.9]  Chest pain, unspecified type [R07.9]                   Date / Time: 7/29/2025 11:14 PM    Patient Admission Status: Observation   Readmission Risk (Low < 19, Mod (19-27), High > 27): Readmission Risk Score: 32.9    Current PCP: Nuzhat Ramos DTR  PCP verified by CM? Yes    Chart Reviewed: Yes      History Provided by: Patient  Patient Orientation: Alert and Oriented    Patient Cognition: Alert    Hospitalization in the last 30 days (Readmission):  No    If yes, Readmission Assessment in CM Navigator will be completed.    Advance Directives:      Code Status: Full Code   Patient's Primary Decision Maker is: Legal Next of Kin    Primary Decision Maker: ENRICO SOL - Grandchild - [phone]    Primary Decision Maker (Active): Monikabushra - Other Relative - [phone]    Discharge Planning:    Patient lives with: Alone Type of Home: Apartment  Primary Care Giver: Self  Patient Support Systems include: Family Members   Current Financial resources: Medicare  Current community resources: ECF/Home Care ([facility]- 5 hours a day, 7 Days a week, from [place]. [facility].)  Current services prior to admission: C-pap, Durable Medical Equipment            Current DME: Shower Chair, Cpap, Oxygen Therapy (Comment), Walker, Home Aerosol, Other (Comment) (Electric Scooter. Oxygen- Wears 2LNC, at HS/PRN, Concentrator-[employer].)            Type of Home Care services:  Aide Services    ADLS  Prior functional level: Independent in ADLs/IADLs, Assistance with the following:, Cooking, Housework, Shopping  Current functional level:

## 2025-07-30 NOTE — RT PROTOCOL NOTE
RT Inhaler-Nebulizer Bronchodilator Protocol Note    There is a bronchodilator order in the chart from a provider indicating to follow the RT Bronchodilator Protocol and there is an “Initiate RT Inhaler-Nebulizer Bronchodilator Protocol” order as well (see protocol at bottom of note).    CXR Findings:  XR CHEST PORTABLE  Result Date: 7/30/2025  Atelectatic changes in the lower lungs, greater on the right.       The findings from the last RT Protocol Assessment were as follows:   History Pulmonary Disease: Chronic pulmonary disease  Respiratory Pattern: Regular pattern and RR 12-20 bpm  Breath Sounds: Clear breath sounds  Cough: Strong, productive  Indication for Bronchodilator Therapy:    Bronchodilator Assessment Score: 3    Aerosolized bronchodilator medication orders have been revised according to the RT Inhaler-Nebulizer Bronchodilator Protocol below.    Respiratory Therapist to perform RT Therapy Protocol Assessment initially then follow the protocol.  Repeat RT Therapy Protocol Assessment PRN for score 0-3 or on second treatment, BID, and PRN for scores above 3.    No Indications - adjust the frequency to every 6 hours PRN wheezing or bronchospasm, if no treatments needed after 48 hours then discontinue using Per Protocol order mode.     If indication present, adjust the RT bronchodilator orders based on the Bronchodilator Assessment Score as indicated below.  Use Inhaler orders unless patient has one or more of the following: on home nebulizer, not able to hold breath for 10 seconds, is not alert and oriented, cannot activate and use MDI correctly, or respiratory rate 25 breaths per minute or more, then use the equivalent nebulizer order(s) with same Frequency and PRN reasons based on the score.  If a patient is on this medication at home then do not decrease Frequency below that used at home.    0-3 - enter or revise RT bronchodilator order(s) to equivalent RT Bronchodilator order with Frequency of every 4

## 2025-07-30 NOTE — DISCHARGE INSTR - COC
Continuity of Care Form    Patient Name: Mairangel Abreu   :  1950  MRN:  [id number]    Admit date:  2025  Discharge date:  2025        Code Status Order: Prior   Advance Directives:     Admitting Physician:  No admitting provider for patient encounter.  PCP: Nuzhat Ramos DTR    Discharging Nurse: JOSH Brock RN  Discharging Hospital Unit/Room#:   Discharging Unit Phone Number: [phone]    Emergency Contact:   Extended Emergency Contact Information  Primary Emergency Contact: SWETAENRICO  Address: [facility]  Home Phone: [phone]  Relation: Grandchild  Secondary Emergency Contact: Yvonne Kirkland  Home Phone: [phone]  Relation: Grandchild    Past Surgical History:  Past Surgical History:   Procedure Laterality Date    ABLATION OF DYSRHYTHMIC FOCUS      CARDIAC PROCEDURE N/A 2024    klarissa / Left heart cath / coronary angiography / stemi er 22 performed by Victoriano Dee MD at [facility] CARDIAC CATH LAB    CARDIAC PROCEDURE N/A 3/11/2024    klarissa / Percutaneous coronary intervention performed by Victoriano Dee MD at [facility] CARDIAC CATH LAB    CARDIAC PROCEDURE N/A 3/31/2025    ali / Left heart cath / coronary angiography / op scmh performed by Antonio Robins MD at [facility] CARDIAC CATH LAB    CARDIAC PROCEDURE N/A 3/31/2025    Percutaneous coronary intervention performed by Antonio Robins MD at [facility] CARDIAC CATH LAB    CARDIAC PROCEDURE N/A 2025    Percutaneous coronary intervention performed by Jackie Hayes MD at [facility] CARDIAC CATH LAB    CARDIAC PROCEDURE N/A 2025    Left heart cath / coronary angiography performed by Jackie Hayes MD at [facility] CARDIAC CATH LAB    CARDIAC PROCEDURE N/A 2025    Intravascular ultrasound performed by Jackie Hayes MD at [facility] CARDIAC CATH LAB    CARPAL TUNNEL RELEASE Right     CATARACT REMOVAL WITH IMPLANT Bilateral     CHOLECYSTECTOMY      COLONOSCOPY      COLONOSCOPY  04/10/2017    tubular adenomo  Render In Strict Bullet Format?: No Initiate Treatment: dutasteride 0.5 mg capsule \\nTake 1 capsule by mouth every day.\\n\\nminoxidil 5 % topical foam \\nApply to affected area on scalp daily to BID. Avoid application to face. Detail Level: Zone

## 2025-07-30 NOTE — PROGRESS NOTES
Providers,    Upon reviewing the patient's chart, there is currently no active pharmacological DVT prophylaxis.  Please address and order if appropriate, if not, please indicate reason.    Noted in Internal Medicine note that patient on Lovenox for DVTp, but there is no order in patient chart.     Thank You    Liang Soni, PharmD. [facility]  7/30/2025  3:41 PM

## 2025-07-30 NOTE — H&P
[facility] Internal Medicine  Mejia Kelly MD; Clifford Albrecht MD, Ivana Orellana MD,    Rose Buckley MD, Jhony Hoyos MD.    [facility] Internal Medicine   IN-PATIENT SERVICE   [facility]    HISTORY AND PHYSICAL EXAMINATION            Date:   7/30/2025  Patient name:  Mariangel Abreu  Date of admission:  7/29/2025 11:14 PM  MRN:   [id number]  Account:  [id number]  YOB: 1950  PCP:    Nuzhat Ramos DTR  Room:   2123/2123-01  Code Status:    Full Code    Chief Complaint:     Chief Complaint   Patient presents with    Chest Pain    Shortness of Breath       History Obtained From:     Patient/EMR/Bedside RN    History of Present Illness:   Mariangel Abreu is a 74 y.o. Non- / non  female   has a significant medical history including anxiety, asthma,CAD with multiple stent placements, CKD, GERD, hypertension, and hyperlipidemia.     Patient presented with a complaint of chest pain.Patient states that she was sitting on the edge of her bed when it started with a sudden onset. Patient states that the pain was sharp in nature, and nonradiating.  Patient states that she took a nitroglycerin at home and the pain subsided.  However about 10 minutes later the second episode of pain began which prompted her to call EMS.     Upon assessment in the ED the patient is still having some moderate chest pain but is nowhere near as bad as before.  Patient does endorse some shortness of breath, and states she uses 2 L of nasal cannula oxygen as needed at home.     Patient underwent heart cath in April 2025 at which time her left circumflex artery was stented with improvement, her OM1 was also stented but had no improvement in flow.  Patient's chest wall is tender to palpation patient is EKG was interpreted by the ED physician that shows no acute changes in her ST segments and no acute change in her T waves.  Patient's labs are notable for an elevated  - CNP   budesonide-formoterol (SYMBICORT) 160-4.5 MCG/ACT AERO INHALE 2 PUFFS INTO THE LUNGS 2 TIMES DAILY AS NEEDED FOR FOR SHORTNESS OF BREATH 11/26/24 11/26/25 Yes Carla Chua APRN - CNP   gabapentin (NEURONTIN) 100 MG capsule Take 1 capsule by mouth 2 times daily for 30 days. 11/3/24 7/30/25 Yes Genia Ramos MD   isosorbide mononitrate (IMDUR) 30 MG extended release tablet Take 2 tablets by mouth daily 10/7/24  Yes Riley Fox MD   alogliptin (NESINA) 12.5 MG TABS tablet Take 1 tablet by mouth daily 7/26/24  Yes Carla Chua APRN - CNP   topiramate (TOPAMAX) 100 MG tablet GIVE 1 TABLET BY MOUTH ONE TIME A DAY FOR SEIZURES 6/3/24  Yes Carla Chua APRN - CNP   oxyBUTYnin (DITROPAN-XL) 5 MG extended release tablet GIVE 1 TABLET BY MOUTH ONE TIME A DAY FOR BLADDER SPASM 6/3/24  Yes Carla Chua APRN - CNP   midodrine (PROAMATINE) 2.5 MG tablet GIVE 1 TABLET BY MOUTH WITH MEALS FOR HYPOTENSION ** HOLD IF SBP IS GREATER THAN 110 6/3/24  Yes Carla Chua APRN - CNP   escitalopram (LEXAPRO) 20 MG tablet GIVE 1 TABLET BY MOUTH ONE TIME A DAY FOR DEPRESSION 6/3/24  Yes Carla Chua APRN - CNP   metoclopramide (REGLAN) 10 MG tablet Take 1 tablet by mouth 3 times daily as needed (nausea) 7/25/25   Miladys Hook,    ibuprofen (ADVIL;MOTRIN) 600 MG tablet Take 1 tablet by mouth 3 times daily as needed for Pain 6/22/25   Prosper Hoover,    lidocaine 4 % external patch Place 1 patch onto the skin daily 6/13/25   Chris Toledo,    nitroGLYCERIN (NITROSTAT) 0.4 MG SL tablet Place 1 tablet under the tongue every 5 minutes as needed for Chest pain up to max of 3 total doses. If no relief after 1 dose, call 911. 4/1/25   Rose Buckley MD   insulin aspart (NOVOLOG FLEXPEN) 100 UNIT/ML injection pen 5 units with each meal plus IF<139 NO INSULIN; 140-199-4 UN;200-249-8 UN;250-299-10 UN;300-349-12 UN;350-400=16 UN;ABOVE 400: 20 UNIT(S)  Patient taking differently: Inject

## 2025-07-30 NOTE — PROGRESS NOTES
Patient called out complaining of new worsening chest pain. EKG done. Writer reached out to MD. Nitro ordered. Troponin ordered. Patient complaining of headache and nausea from nitro, patient given Tylenol and zofran.

## 2025-07-30 NOTE — PROGRESS NOTES
[facility] Internal Medicine  Mejia Kelly MD; Clifford Albrecht MD; Rose Buckley MD;  Ivana Orellana MD; Jhony Hoyos MD  [facility] Internal Medicine   IN-PATIENT SERVICE  [facility]                 Date:   7/30/2025  Patientname:  Mariangel Abreu  Date of admission:  7/29/2025 11:14 PM  MRN:   [id number]  Account:  [id number]  YOB: 1950  PCP:    Nuzhat Ramos DTR  Room:   02/02  Code Status:    Full Code    Chief Complaint:     Chief Complaint   Patient presents with    Chest Pain    Shortness of Breath     History of Present Illness:     Mariangel Abreu is a 74 y.o. Non- / non  female who presents with Chest Pain and Shortness of Breath   and is admitted to the hospital for the management of Chest pain.    Patient arrived to the ED with left-sided chest pain.  Patient stated the chest pain began around 9:30 PM.  The patient has a significant medical history including anxiety, asthma, atrial fibrillation on Brilinta, CAD with multiple stent placements, CKD, GERD, hypertension, and hyperlipidemia.      Patient states that she was sitting on the edge of her bed when it started with a sudden onset. Patient states that the pain was sharp in nature, and nonradiating.  Patient states that she took a nitroglycerin at home and the pain subsided.  However about 10 minutes later the second episode of pain began which prompted her to call EMS.    Upon assessment in the ED the patient is still having some moderate chest pain but is nowhere near as bad as before.  Patient does endorse some shortness of breath, and states she uses 2 L of nasal cannula oxygen as needed at home.    Patient underwent heart cath in April 2025 at which time her left circumflex artery was stented with improvement, her OM1 was also stented but had no improvement in flow.  Patient's chest wall is tender to palpation patient is EKG was interpreted by the ED physician that shows no  acute changes in her ST segments and no acute change in her T waves.  Patient's labs are notable for an elevated creatinine of 1.6 with baseline being around 1.2.  Patient also has a elevated blood glucose of 532, also presenting pseudohyponatremia.    Patient's hyponatremia was treated with a 500 mL bolus of normal saline in the ER along with 5 units of short acting insulin.  Patient states that the chest pain is waxing and waning and she was given aspirin 324 along with nitro in the ER.  Plan to bring patient to medical floor for pain control, and evaluation with cardiology in the a.m.    Past Medical History:     Past Medical History:   Diagnosis Date    Abdominal bloating 01/26/2021    Adenomatous polyp of ascending colon 01/26/2021    Allergic rhinitis     Anxiety 07/17/2013    Arthritis     Asthma     Atrial fibrillation (HCC)     Back pain     NERVE/DR. GRACIA    Benign hypertension with CKD (chronic kidney disease) stage III (formerly [facility])     CAD (coronary artery disease) 03/21/2013    Caffeine use     2 coffee/day    CHF (congestive heart failure) (formerly [facility])     Chronic kidney disease     CKD (chronic kidney disease) stage 3, GFR 30-59 ml/min (formerly [facility])     Claudication 6/2/2024    COPD (chronic obstructive pulmonary disease) (formerly [facility])     emphysema    Degeneration of lumbar or lumbosacral intervertebral disc     Depression     DM (diabetes mellitus) (formerly [facility])     Emphysema of lung (HCC)     Gastritis     GERD (gastroesophageal reflux disease)     Hearing loss     Hematuria     Hiatal hernia     History of loop recorder     HTN (hypertension), benign 06/28/2014    Hypertriglyceridemia     Incontinence of urine 09/25/2013    Irritable bowel syndrome with both constipation and diarrhea 01/26/2021    Kidney stone     Knee arthropathy     Long term (current) use of anticoagulants 02/08/2024    Lumbosacral spondylosis without myelopathy 09/29/2014    Migraine headache     DR. GRACIA    Mumps     Neuropathy     Obesity     On home oxygen

## 2025-07-30 NOTE — PROGRESS NOTES
Verbal order to place urology consult to Dr. Moss for new urinary incontinence. Patient has new patient appt tomorrow outpatient, patient to see urology inpatient.

## 2025-07-30 NOTE — CARE COORDINATION
Per Pt. Request, Writer called Dr. Moss's Office, for pt. Has an apt jean, 7/31/25 @ 9:20 AM. Writer spoke to Avelina & GREGORY apt & informed her that pt. Will call when she is DC to reschedule. Writer did offer to reschedule apt, pt. Decline.

## 2025-07-31 ENCOUNTER — APPOINTMENT (OUTPATIENT)
Dept: ULTRASOUND IMAGING | Age: 75
End: 2025-07-31
Payer: MEDICARE

## 2025-07-31 ENCOUNTER — APPOINTMENT (OUTPATIENT)
Age: 75
End: 2025-07-31
Payer: MEDICARE

## 2025-07-31 LAB
ANION GAP SERPL CALCULATED.3IONS-SCNC: 10 MMOL/L (ref 9–16)
ANION GAP SERPL CALCULATED.3IONS-SCNC: 11 MMOL/L (ref 9–16)
BACTERIA URNS QL MICRO: ABNORMAL
BILIRUB UR QL STRIP: NEGATIVE
BUN SERPL-MCNC: 28 MG/DL (ref 8–23)
BUN SERPL-MCNC: 29 MG/DL (ref 8–23)
CALCIUM SERPL-MCNC: 9 MG/DL (ref 8.6–10.4)
CALCIUM SERPL-MCNC: 9.1 MG/DL (ref 8.6–10.4)
CASTS #/AREA URNS LPF: ABNORMAL /LPF
CHLORIDE SERPL-SCNC: 98 MMOL/L (ref 98–107)
CHLORIDE SERPL-SCNC: 99 MMOL/L (ref 98–107)
CLARITY UR: CLEAR
CO2 SERPL-SCNC: 22 MMOL/L (ref 20–31)
CO2 SERPL-SCNC: 28 MMOL/L (ref 20–31)
COLOR UR: YELLOW
CREAT SERPL-MCNC: 1.3 MG/DL (ref 0.7–1.2)
CREAT SERPL-MCNC: 1.4 MG/DL (ref 0.7–1.2)
ECHO AO ROOT DIAM: 3.4 CM
ECHO AO ROOT INDEX: 1.69 CM/M2
ECHO AV AREA PEAK VELOCITY: 3.1 CM2
ECHO AV AREA VTI: 2.6 CM2
ECHO AV AREA/BSA PEAK VELOCITY: 1.5 CM2/M2
ECHO AV AREA/BSA VTI: 1.3 CM2/M2
ECHO AV MEAN GRADIENT: 3 MMHG
ECHO AV MEAN VELOCITY: 0.8 M/S
ECHO AV PEAK GRADIENT: 6 MMHG
ECHO AV PEAK VELOCITY: 1.3 M/S
ECHO AV VELOCITY RATIO: 0.69
ECHO AV VTI: 29.6 CM
ECHO BSA: 2.12 M2
ECHO BSA: 2.12 M2
ECHO EST RA PRESSURE: 3 MMHG
ECHO LA AREA 2C: 13.8 CM2
ECHO LA AREA 4C: 14.3 CM2
ECHO LA DIAMETER INDEX: 2.04 CM/M2
ECHO LA DIAMETER: 4.1 CM
ECHO LA MAJOR AXIS: 5 CM
ECHO LA MINOR AXIS: 5.1 CM
ECHO LA TO AORTIC ROOT RATIO: 1.21
ECHO LA VOL BP: 31 ML (ref 22–52)
ECHO LA VOL MOD A2C: 29 ML (ref 22–52)
ECHO LA VOL MOD A4C: 32 ML (ref 22–52)
ECHO LA VOL/BSA BIPLANE: 15 ML/M2 (ref 16–34)
ECHO LA VOLUME INDEX MOD A2C: 14 ML/M2 (ref 16–34)
ECHO LA VOLUME INDEX MOD A4C: 16 ML/M2 (ref 16–34)
ECHO LV E' LATERAL VELOCITY: 6.2 CM/S
ECHO LV E' SEPTAL VELOCITY: 5.22 CM/S
ECHO LV EF PHYSICIAN: 55 %
ECHO LV FRACTIONAL SHORTENING: 34 % (ref 28–44)
ECHO LV INTERNAL DIMENSION DIASTOLE INDEX: 2.34 CM/M2
ECHO LV INTERNAL DIMENSION DIASTOLIC: 4.7 CM (ref 3.9–5.3)
ECHO LV INTERNAL DIMENSION SYSTOLIC INDEX: 1.54 CM/M2
ECHO LV INTERNAL DIMENSION SYSTOLIC: 3.1 CM
ECHO LV IVSD: 1.2 CM (ref 0.6–0.9)
ECHO LV MASS 2D: 212 G (ref 67–162)
ECHO LV MASS INDEX 2D: 105.5 G/M2 (ref 43–95)
ECHO LV POSTERIOR WALL DIASTOLIC: 1.2 CM (ref 0.6–0.9)
ECHO LV RELATIVE WALL THICKNESS RATIO: 0.51
ECHO LVOT AREA: 4.5 CM2
ECHO LVOT AV VTI INDEX: 0.58
ECHO LVOT DIAM: 2.4 CM
ECHO LVOT MEAN GRADIENT: 1 MMHG
ECHO LVOT PEAK GRADIENT: 3 MMHG
ECHO LVOT PEAK VELOCITY: 0.9 M/S
ECHO LVOT STROKE VOLUME INDEX: 38.7 ML/M2
ECHO LVOT SV: 77.8 ML
ECHO LVOT VTI: 17.2 CM
ECHO MV A VELOCITY: 0.9 M/S
ECHO MV E DECELERATION TIME (DT): 229 MS
ECHO MV E VELOCITY: 0.73 M/S
ECHO MV E/A RATIO: 0.81
ECHO MV E/E' LATERAL: 11.77
ECHO MV E/E' RATIO (AVERAGED): 12.88
ECHO MV E/E' SEPTAL: 13.98
ECHO RA AREA 4C: 10.6 CM2
ECHO RA END SYSTOLIC VOLUME APICAL 4 CHAMBER INDEX BSA: 11 ML/M2
ECHO RA VOLUME: 22 ML
ECHO RV TAPSE: 1.4 CM (ref 1.7–?)
EPI CELLS #/AREA URNS HPF: ABNORMAL /HPF
ERYTHROCYTE [DISTWIDTH] IN BLOOD BY AUTOMATED COUNT: 12.6 % (ref 11.5–14.9)
GFR, ESTIMATED: 39 ML/MIN/1.73M2
GFR, ESTIMATED: 43 ML/MIN/1.73M2
GLUCOSE BLD-MCNC: 204 MG/DL (ref 65–105)
GLUCOSE BLD-MCNC: 216 MG/DL (ref 65–105)
GLUCOSE BLD-MCNC: 278 MG/DL (ref 65–105)
GLUCOSE BLD-MCNC: 302 MG/DL (ref 65–105)
GLUCOSE SERPL-MCNC: 240 MG/DL (ref 74–99)
GLUCOSE SERPL-MCNC: 287 MG/DL (ref 74–99)
GLUCOSE UR STRIP-MCNC: ABNORMAL MG/DL
HCT VFR BLD AUTO: 40.5 % (ref 36–46)
HGB BLD-MCNC: 13.3 G/DL (ref 12–16)
HGB UR QL STRIP.AUTO: NEGATIVE
KETONES UR STRIP-MCNC: NEGATIVE MG/DL
LEUKOCYTE ESTERASE UR QL STRIP: ABNORMAL
MAGNESIUM SERPL-MCNC: 1.6 MG/DL (ref 1.6–2.4)
MCH RBC QN AUTO: 30 PG (ref 26–34)
MCHC RBC AUTO-ENTMCNC: 32.8 G/DL (ref 31–37)
MCV RBC AUTO: 91.2 FL (ref 80–100)
NITRITE UR QL STRIP: NEGATIVE
NRBC BLD-RTO: 0 PER 100 WBC
PH UR STRIP: 6 [PH] (ref 5–8)
PLATELET # BLD AUTO: 180 K/UL (ref 150–450)
PMV BLD AUTO: 11.7 FL (ref 8–13.5)
POTASSIUM SERPL-SCNC: 4.2 MMOL/L (ref 3.7–5.3)
POTASSIUM SERPL-SCNC: 5.1 MMOL/L (ref 3.7–5.3)
PROT UR STRIP-MCNC: NEGATIVE MG/DL
RBC # BLD AUTO: 4.44 M/UL (ref 3.95–5.11)
RBC #/AREA URNS HPF: ABNORMAL /HPF
SODIUM SERPL-SCNC: 131 MMOL/L (ref 136–145)
SODIUM SERPL-SCNC: 137 MMOL/L (ref 136–145)
SP GR UR STRIP: 1.03 (ref 1–1.03)
UROBILINOGEN UR STRIP-ACNC: NORMAL EU/DL (ref 0–1)
WBC #/AREA URNS HPF: ABNORMAL /HPF
WBC OTHER # BLD: 6.5 K/UL (ref 3.5–11)

## 2025-07-31 PROCEDURE — 99233 SBSQ HOSP IP/OBS HIGH 50: CPT

## 2025-07-31 PROCEDURE — 81001 URINALYSIS AUTO W/SCOPE: CPT

## 2025-07-31 PROCEDURE — 51798 US URINE CAPACITY MEASURE: CPT

## 2025-07-31 PROCEDURE — 2580000003 HC RX 258

## 2025-07-31 PROCEDURE — 96365 THER/PROPH/DIAG IV INF INIT: CPT

## 2025-07-31 PROCEDURE — 6370000000 HC RX 637 (ALT 250 FOR IP)

## 2025-07-31 PROCEDURE — 97162 PT EVAL MOD COMPLEX 30 MIN: CPT

## 2025-07-31 PROCEDURE — G0378 HOSPITAL OBSERVATION PER HR: HCPCS

## 2025-07-31 PROCEDURE — 36415 COLL VENOUS BLD VENIPUNCTURE: CPT

## 2025-07-31 PROCEDURE — 76770 US EXAM ABDO BACK WALL COMP: CPT

## 2025-07-31 PROCEDURE — 6370000000 HC RX 637 (ALT 250 FOR IP): Performed by: NURSE PRACTITIONER

## 2025-07-31 PROCEDURE — 82947 ASSAY GLUCOSE BLOOD QUANT: CPT

## 2025-07-31 PROCEDURE — 2500000003 HC RX 250 WO HCPCS

## 2025-07-31 PROCEDURE — 2700000000 HC OXYGEN THERAPY PER DAY

## 2025-07-31 PROCEDURE — 94640 AIRWAY INHALATION TREATMENT: CPT

## 2025-07-31 PROCEDURE — 99223 1ST HOSP IP/OBS HIGH 75: CPT | Performed by: NURSE PRACTITIONER

## 2025-07-31 PROCEDURE — 80048 BASIC METABOLIC PNL TOTAL CA: CPT

## 2025-07-31 PROCEDURE — 97110 THERAPEUTIC EXERCISES: CPT

## 2025-07-31 PROCEDURE — 96366 THER/PROPH/DIAG IV INF ADDON: CPT

## 2025-07-31 PROCEDURE — C8929 TTE W OR WO FOL WCON,DOPPLER: HCPCS

## 2025-07-31 PROCEDURE — 6360000002 HC RX W HCPCS

## 2025-07-31 PROCEDURE — 93971 EXTREMITY STUDY: CPT | Performed by: SURGERY

## 2025-07-31 PROCEDURE — 83735 ASSAY OF MAGNESIUM: CPT

## 2025-07-31 PROCEDURE — 96361 HYDRATE IV INFUSION ADD-ON: CPT

## 2025-07-31 PROCEDURE — 6360000004 HC RX CONTRAST MEDICATION: Performed by: NURSE PRACTITIONER

## 2025-07-31 PROCEDURE — 85027 COMPLETE CBC AUTOMATED: CPT

## 2025-07-31 PROCEDURE — 94760 N-INVAS EAR/PLS OXIMETRY 1: CPT

## 2025-07-31 PROCEDURE — 93005 ELECTROCARDIOGRAM TRACING: CPT

## 2025-07-31 PROCEDURE — 93306 TTE W/DOPPLER COMPLETE: CPT | Performed by: INTERNAL MEDICINE

## 2025-07-31 PROCEDURE — 97116 GAIT TRAINING THERAPY: CPT

## 2025-07-31 RX ORDER — ISOSORBIDE MONONITRATE 30 MG/1
30 TABLET, EXTENDED RELEASE ORAL ONCE
Status: COMPLETED | OUTPATIENT
Start: 2025-07-31 | End: 2025-07-31

## 2025-07-31 RX ORDER — SODIUM CHLORIDE 9 MG/ML
INJECTION, SOLUTION INTRAVENOUS CONTINUOUS
Status: ACTIVE | OUTPATIENT
Start: 2025-07-31 | End: 2025-08-01

## 2025-07-31 RX ORDER — OXYBUTYNIN CHLORIDE 10 MG/1
10 TABLET, EXTENDED RELEASE ORAL NIGHTLY
Status: DISCONTINUED | OUTPATIENT
Start: 2025-07-31 | End: 2025-08-02 | Stop reason: HOSPADM

## 2025-07-31 RX ADMIN — PANTOPRAZOLE SODIUM 40 MG: 40 TABLET, DELAYED RELEASE ORAL at 09:08

## 2025-07-31 RX ADMIN — METOPROLOL TARTRATE 12.5 MG: 25 TABLET, FILM COATED ORAL at 21:12

## 2025-07-31 RX ADMIN — MAGNESIUM SULFATE HEPTAHYDRATE 2000 MG: 40 INJECTION, SOLUTION INTRAVENOUS at 18:45

## 2025-07-31 RX ADMIN — METOPROLOL TARTRATE 12.5 MG: 25 TABLET, FILM COATED ORAL at 09:08

## 2025-07-31 RX ADMIN — ACETAMINOPHEN 650 MG: 325 TABLET ORAL at 21:20

## 2025-07-31 RX ADMIN — RANOLAZINE 1000 MG: 500 TABLET, EXTENDED RELEASE ORAL at 21:12

## 2025-07-31 RX ADMIN — TOPIRAMATE 100 MG: 100 TABLET, FILM COATED ORAL at 21:12

## 2025-07-31 RX ADMIN — TICAGRELOR 90 MG: 90 TABLET ORAL at 09:08

## 2025-07-31 RX ADMIN — MIDODRINE HYDROCHLORIDE 2.5 MG: 2.5 TABLET ORAL at 09:08

## 2025-07-31 RX ADMIN — ASPIRIN 81 MG: 81 TABLET, CHEWABLE ORAL at 09:08

## 2025-07-31 RX ADMIN — INSULIN LISPRO 2 UNITS: 100 INJECTION, SOLUTION INTRAVENOUS; SUBCUTANEOUS at 09:08

## 2025-07-31 RX ADMIN — OXYBUTYNIN CHLORIDE 10 MG: 10 TABLET, EXTENDED RELEASE ORAL at 21:12

## 2025-07-31 RX ADMIN — INSULIN LISPRO 2 UNITS: 100 INJECTION, SOLUTION INTRAVENOUS; SUBCUTANEOUS at 11:46

## 2025-07-31 RX ADMIN — RANOLAZINE 1000 MG: 500 TABLET, EXTENDED RELEASE ORAL at 09:08

## 2025-07-31 RX ADMIN — MIDODRINE HYDROCHLORIDE 2.5 MG: 2.5 TABLET ORAL at 16:39

## 2025-07-31 RX ADMIN — ACETAMINOPHEN 650 MG: 325 TABLET ORAL at 11:34

## 2025-07-31 RX ADMIN — MIDODRINE HYDROCHLORIDE 2.5 MG: 2.5 TABLET ORAL at 11:34

## 2025-07-31 RX ADMIN — ATORVASTATIN CALCIUM 80 MG: 80 TABLET, FILM COATED ORAL at 09:08

## 2025-07-31 RX ADMIN — TICAGRELOR 90 MG: 90 TABLET ORAL at 21:12

## 2025-07-31 RX ADMIN — Medication 3 MG: at 21:20

## 2025-07-31 RX ADMIN — ESCITALOPRAM OXALATE 20 MG: 20 TABLET, FILM COATED ORAL at 09:08

## 2025-07-31 RX ADMIN — INSULIN LISPRO 4 UNITS: 100 INJECTION, SOLUTION INTRAVENOUS; SUBCUTANEOUS at 21:13

## 2025-07-31 RX ADMIN — PANTOPRAZOLE SODIUM 40 MG: 40 TABLET, DELAYED RELEASE ORAL at 21:12

## 2025-07-31 RX ADMIN — SODIUM CHLORIDE: 0.9 INJECTION, SOLUTION INTRAVENOUS at 10:18

## 2025-07-31 RX ADMIN — ISOSORBIDE MONONITRATE 60 MG: 60 TABLET, EXTENDED RELEASE ORAL at 09:08

## 2025-07-31 RX ADMIN — SODIUM CHLORIDE, PRESERVATIVE FREE 10 ML: 5 INJECTION INTRAVENOUS at 09:09

## 2025-07-31 RX ADMIN — GABAPENTIN 100 MG: 100 CAPSULE ORAL at 21:12

## 2025-07-31 RX ADMIN — INSULIN GLARGINE 45 UNITS: 100 INJECTION, SOLUTION SUBCUTANEOUS at 21:13

## 2025-07-31 RX ADMIN — BUDESONIDE AND FORMOTEROL FUMARATE DIHYDRATE 1 PUFF: 160; 4.5 AEROSOL RESPIRATORY (INHALATION) at 07:44

## 2025-07-31 RX ADMIN — SULFUR HEXAFLUORIDE 3 ML: KIT at 15:11

## 2025-07-31 RX ADMIN — INSULIN LISPRO 6 UNITS: 100 INJECTION, SOLUTION INTRAVENOUS; SUBCUTANEOUS at 16:39

## 2025-07-31 RX ADMIN — ISOSORBIDE MONONITRATE 30 MG: 30 TABLET, EXTENDED RELEASE ORAL at 10:23

## 2025-07-31 RX ADMIN — INSULIN GLARGINE 50 UNITS: 100 INJECTION, SOLUTION SUBCUTANEOUS at 09:09

## 2025-07-31 RX ADMIN — GABAPENTIN 100 MG: 100 CAPSULE ORAL at 09:08

## 2025-07-31 RX ADMIN — NITROGLYCERIN 0.4 MG: 0.4 TABLET SUBLINGUAL at 21:15

## 2025-07-31 ASSESSMENT — PAIN SCALES - GENERAL
PAINLEVEL_OUTOF10: 8
PAINLEVEL_OUTOF10: 7
PAINLEVEL_OUTOF10: 0
PAINLEVEL_OUTOF10: 8
PAINLEVEL_OUTOF10: 8
PAINLEVEL_OUTOF10: 0

## 2025-07-31 ASSESSMENT — ENCOUNTER SYMPTOMS
SHORTNESS OF BREATH: 1
CHOKING: 0
STRIDOR: 0
ABDOMINAL PAIN: 0
NAUSEA: 1
COUGH: 0
WHEEZING: 0
CHEST TIGHTNESS: 1
VOMITING: 0
DIARRHEA: 0

## 2025-07-31 ASSESSMENT — PAIN DESCRIPTION - LOCATION
LOCATION: CHEST
LOCATION: HEAD
LOCATION: CHEST;HEAD
LOCATION: CHEST

## 2025-07-31 ASSESSMENT — PAIN DESCRIPTION - DESCRIPTORS
DESCRIPTORS: ACHING
DESCRIPTORS: ACHING
DESCRIPTORS: PRESSURE
DESCRIPTORS: THROBBING

## 2025-07-31 ASSESSMENT — PAIN - FUNCTIONAL ASSESSMENT
PAIN_FUNCTIONAL_ASSESSMENT: PREVENTS OR INTERFERES SOME ACTIVE ACTIVITIES AND ADLS
PAIN_FUNCTIONAL_ASSESSMENT: PREVENTS OR INTERFERES SOME ACTIVE ACTIVITIES AND ADLS

## 2025-07-31 ASSESSMENT — PAIN DESCRIPTION - ORIENTATION: ORIENTATION: LEFT

## 2025-07-31 NOTE — CARE COORDINATION
Case Management   Daily Progress Note       Patient Name: Mariangel Abreu                   YOB: 1950  Diagnosis: Chest pain [R07.9]  Hyperglycemia [R73.9]  YAJAIRA (acute kidney injury) [N17.9]  Chest pain, unspecified type [R07.9]                         days  Length of Stay: 0  days    Readmission Risk (Low < 19, Mod (19-27), High > 27): Readmission Risk Score: 32.9      Patient is alert and oriented.    Spoke with Patient, and Current Transitional Plan is:    [x] Home Independently, Alone.    [] Home with HC    [] Skilled Nursing Facility    [] Acute Rehabilitation    [] Long Term Acute Care (LTAC)    [] Other:     Medical Management: Remains on IV Fluids, CR+ 1.4, Bun 29, Improving.Renal US, Urology following, Meds adjusted. Cardio following, Echo ordered/ Meds Adjusted, Sating 96% on 2LNC, Wears, 2LNC, at HS/PRN at home.     Testing Ordered: Labs.    Additional Notes: Denies VNS/Needs. PT/OT on board, will follow for any rec/needs.     Electronically signed by Vickie Morrell RN on 7/31/2025 at 12:06 PM

## 2025-07-31 NOTE — PROGRESS NOTES
Urology rounded.  Consult for UI.   Was supposed to be seen today in office.   Chronic issue, mixed incontinence- quite severe 10+ briefs/day.  Started on oxybuytnin 5mg xr, will increase to 10mg xr po qd.   Outpatient fu.   Formal note to follow.

## 2025-07-31 NOTE — PROGRESS NOTES
Physical Therapy    [facility]   Physical Therapy Evaluation  Date: 25  Patient Name: Mariangel Abreu       Room: 3-  MRN: [id number]  Account: [id number]   : 1950  (74 y.o.) Gender: female     Discharge Recommendations:  Discharge Recommendations: Home with assist PRN, Therapy recommended at discharge           Past Medical History:  has a past medical history of Abdominal bloating, Adenomatous polyp of ascending colon, Allergic rhinitis, Anxiety, Arthritis, Asthma, Atrial fibrillation ([facility]), Back pain, Benign hypertension with CKD (chronic kidney disease) stage III ([facility]), CAD (coronary artery disease), Caffeine use, CHF (congestive heart failure) ([facility]), Chronic kidney disease, CKD (chronic kidney disease) stage 3, GFR 30-59 ml/min ([facility]), Claudication, COPD (chronic obstructive pulmonary disease) ([facility]), Degeneration of lumbar or lumbosacral intervertebral disc, Depression, DM (diabetes mellitus) ([facility]), Emphysema of lung ([facility]), Gastritis, GERD (gastroesophageal reflux disease), Hearing loss, Hematuria, Hiatal hernia, History of loop recorder, HTN (hypertension), benign, Hypertriglyceridemia, Incontinence of urine, Irritable bowel syndrome with both constipation and diarrhea, Kidney stone, Knee arthropathy, Long term (current) use of anticoagulants, Lumbosacral spondylosis without myelopathy, Migraine headache, Mumps, Neuropathy, Obesity, On home oxygen therapy, HIGINIO on CPAP, Otitis media, Secondary diabetes mellitus with stage 3 chronic kidney disease (GFR 30-59) ([facility]), STEMI (ST elevation myocardial infarction) ([facility]), Stroke ([facility]), TIA (transient ischemic attack), Tinnitus, Type 2 diabetes mellitus without complication ([facility]), Type II or unspecified type diabetes mellitus without mention of complication, not stated as uncontrolled, UTI (lower urinary tract infection), and Varicose vein.  Past Surgical History:   has a past surgical history that includes  to CGA for mobility, will benefit from continued therapy while here as well as home therapy.  Performance Deficits/Impairments: Decreased functional mobility , Decreased strength, Decreased cognition, Decreased endurance, Decreased balance, Decreased posture  Treatment Diagnosis: Weakness, impaired mobility  Therapy Prognosis: Good  Decision Making: Medium Complexity  Discharge Recommendations: Home with assist PRN, Therapy recommended at discharge  Conditions Requiring Skilled Therapeutic Intervention  Assessment: Presents with generalized weakness, decreased endurance, requied SBA to CGA for mobility, will benefit from continued therapy while here as well as home therapy.  Treatment Diagnosis: Weakness, impaired mobility  Therapy Prognosis: Good  Decision Making: Medium Complexity  Discharge Recommendations: Home with assist PRN, Therapy recommended at discharge  Activity Tolerance  Activity Tolerance: Patient limited by fatigue, Patient limited by endurance     Patient Education  Patient Education  Education Given To: Patient  Education Provided: Role of Therapy, Plan of Care, Precautions, Transfer Training, Mobility Training, Energy Conservation, Fall Prevention Strategies  Education Method: Verbal, Demonstration  Education Outcome: Continued education needed, Verbalized understanding     Functional Outcome Measures        Goals  Patient Goals   Patient Goals : Go home  Short Term Goals  Time Frame for Short Term Goals: 5 to 7 visits  Short Term Goal 1: supine<>sit SBA with use of bed rail  Short Term Goal 2: transfers SBA with RW  Short Term Goal 3: Gait with RW distance of 50 ft, spo2 > 89% SBA  Short Term Goal 4: tolerate activity for 20 to 30 minutes to improve strength and endurance.            Plan  Physical Therapy Plan  General Plan: 5-7 times per week  Specific Instructions for Next Treatment: Moniotr o2 with activity, pt uses 2lt o2 PRn at home  Current Treatment Recommendations: Strengthening, Balance

## 2025-07-31 NOTE — PROGRESS NOTES
[facility] Internal Medicine  Mejia Kelly MD; Clifford Albrecht MD, Ivana Orellana MD,    Rose Buckley MD, Jhony Hoyos MD.    [facility] Internal Medicine   IN-PATIENT SERVICE   [facility]  Progress Note            Date:   7/31/2025  Patient name:  Mariangel Abreu  Date of admission:  7/29/2025 11:14 PM  MRN:   [id number]  Account:  [id number]  YOB: 1950  PCP:    Nuzhat Ramos DTR  Room:   2123/2123-01  Code Status:    Full Code    Chief Complaint:     Chief Complaint   Patient presents with    Chest Pain    Shortness of Breath       History Obtained From:     Patient/EMR/Bedside RN    History of Present Illness:   Mariangel Abreu is a 74 y.o. Non- / non  female   has a significant medical history including anxiety, asthma,CAD with multiple stent placements, CKD, GERD, hypertension, and hyperlipidemia.     Patient presented with a complaint of chest pain.Patient states that she was sitting on the edge of her bed when it started with a sudden onset. Patient states that the pain was sharp in nature, and nonradiating.  Patient states that she took a nitroglycerin at home and the pain subsided.  However about 10 minutes later the second episode of pain began which prompted her to call EMS.     Upon assessment in the ED the patient is still having some moderate chest pain but is nowhere near as bad as before.  Patient does endorse some shortness of breath, and states she uses 2 L of nasal cannula oxygen as needed at home.     Patient underwent heart cath in April 2025 at which time her left circumflex artery was stented with improvement, her OM1 was also stented but had no improvement in flow.  Patient's chest wall is tender to palpation patient is EKG was interpreted by the ED physician that shows no acute changes in her ST segments and no acute change in her T waves.  Patient's labs are notable for an elevated creatinine of 1.6 with  Cancer Father     Stomach Cancer Father     Emphysema Sister     Diabetes Maternal Grandmother         also aunts and uncles (maternal)    Breast Cancer Maternal Aunt        Review of Systems:     Positive and Negative as described in HPI.    Review of Systems   Constitutional:  Negative for chills and fever.   Respiratory:  Positive for chest tightness and shortness of breath. Negative for cough, choking, wheezing and stridor.    Cardiovascular:  Positive for chest pain. Negative for leg swelling.   Gastrointestinal:  Positive for nausea. Negative for abdominal pain, diarrhea and vomiting.   Neurological:  Positive for light-headedness. Negative for tremors, seizures, syncope, speech difficulty, numbness and headaches.   Psychiatric/Behavioral:  Negative for confusion.         Physical Exam:   BP (!) 125/47   Pulse 74   Temp 98.1 °F (36.7 °C) (Oral)   Resp 16   Ht 1.575 m (5' 2\")   Wt 102.5 kg (226 lb)   LMP  (LMP Unknown)   SpO2 96%   BMI 41.34 kg/m²   Temp (24hrs), Av.2 °F (36.8 °C), Min:98.1 °F (36.7 °C), Max:98.4 °F (36.9 °C)    Recent Labs     25  1201 25  1636 25  2015 25  0632   POCGLU 244* 256* 317* 204*       Intake/Output Summary (Last 24 hours) at 2025 0934  Last data filed at 2025 0647  Gross per 24 hour   Intake --   Output 1150 ml   Net -1150 ml       Physical Exam  Constitutional:       Appearance: She is obese.   HENT:      Head: Normocephalic and atraumatic.      Mouth/Throat:      Mouth: Mucous membranes are moist.      Pharynx: Oropharynx is clear.   Eyes:      Conjunctiva/sclera: Conjunctivae normal.      Pupils: Pupils are equal, round, and reactive to light.   Cardiovascular:      Rate and Rhythm: Rhythm irregular.      Heart sounds: No murmur heard.     No gallop.   Pulmonary:      Effort: No respiratory distress.      Breath sounds: No wheezing.   Abdominal:      General: Abdomen is flat. There is no distension.      Tenderness: There is no

## 2025-07-31 NOTE — PROGRESS NOTES
Pt had 8 beat run of vtach while resting in bed, asymptomatic. Dr. Hoyos notified.    EKG done, sinus arrhythmia (same as previous)  Mag 1.6, replacement 2g mag currently infusing. BMP done

## 2025-07-31 NOTE — CONSULTS
[facility]    Consult                        Today's Date: 7/31/2025  Patient Name: Mariangel Abreu  Date of admission: 7/29/2025 11:14 PM  Patient's age: 74 y.o., 1950  Admission Dx: Chest pain [R07.9]  Hyperglycemia [R73.9]  YAJAIRA (acute kidney injury) [N17.9]  Chest pain, unspecified type [R07.9]    Reason for Consult:  Cardiac evaluation    Requesting Physician: Jhony Hoyos MD    CHIEF COMPLAINT:  chest pain    History Obtained From:  patient, electronic medical record    HISTORY OF PRESENT ILLNESS:      The patient is a 74 y.o.  female who is admitted to the hospital for chest pain.    Patient presented with a complaint of chest pain.Patient states that she was sitting on the edge of her bed when it started with a sudden onset. Patient states that the pain was sharp in nature, and nonradiating.  Patient states that she took a nitroglycerin at home and the pain subsided.  However about 10 minutes later the second episode of pain began which prompted her to call EMS. EKG was interpreted by the ED physician that shows no acute changes in her ST segments and no acute change in her T waves. Patient's labs are notable for an elevated creatinine of 1.6 with baseline being around 1.2. Patient also has a elevated blood glucose of 532, also presenting pseudohyponatremia.     Known to us with last note from prior admission for CP 6/2025:  Atypical CP  Known CAD/MVD with recent PCI as above  Mechanical fall  Preserved LVEF on echo  Morbid obesity with BMI >40  HTN  COPD/HIGINIO  Chronic back pain  DM2     RECOMMENDATIONS:  Stable. Trop negative and no acute ECG changes.  Continue present meds. No plans for further ischemic work-up  Will increase Ranexa to 1000mg BID. Continue Imdur 60mg daily as BP soft at times  OK for d/c from CV standpoint with OP f/u in clinic as scheduled (she was just seen last month).    Past Medical History:   has a past medical history of Abdominal bloating,  Acute kidney failure, unspecified    Allergic rhinitis, unspecified    Asymptomatic varicose veins of unspecified lower extremity    Dependence on other enabling machines and devices    Diaphragmatic hernia without obstruction or gangrene    Difficulty in walking, not elsewhere classified    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, or end stage renal disease (HCC)    Muscle weakness (generalized)    Other specified diseases of blood and blood-forming organs    Presence of intraocular lens    Tinnitus, unspecified ear    Unspecified hearing loss, bilateral    STEMI (ST elevation myocardial infarction) (HCC)    Acquired absence of other specified parts of digestive tract    Long term (current) use of anticoagulants    Nonrheumatic mitral (valve) insufficiency    Partially vaccinated for covid-19    History of colonic polyps    Personal history of pneumonia (recurrent)    S/P coronary artery stent placement    Personal history of urinary calculi    Weakness    History of ST elevation myocardial infarction (STEMI)    Coronary artery disease involving native coronary artery of native heart without angina pectoris    Secondary hypercoagulable state    Familial hypercholesterolemia    Claudication    Type 2 diabetes mellitus with hyperglycemia (HCC)    Hyperglycemia    CAD S/P percutaneous coronary angioplasty    TIA (transient ischemic attack)    Facial pain, acute    Type 2 diabetes mellitus with hyperglycemia, with long-term current use of insulin (HCC)    Uncontrolled blood glucose    Elevated erythrocyte sedimentation rate    Elevated C-reactive protein (CRP)    Chest pain    Sprain of temporomandibular joint or ligament    Neck pain    Unstable angina (HCC)    Syncope and collapse    Moderate malnutrition    S/P cardiac catheterization    Coronary artery disease due to calcified coronary lesion    S/P angioplasty with stent

## 2025-07-31 NOTE — PLAN OF CARE
Problem: Chronic Conditions and Co-morbidities  Goal: Patient's chronic conditions and co-morbidity symptoms are monitored and maintained or improved  7/31/2025 1852 by Marie Vanegas RN  Outcome: Progressing     Problem: Discharge Planning  Goal: Discharge to home or other facility with appropriate resources  7/31/2025 1852 by Marie Vanegas, RN  Outcome: Progressing     Problem: Pain  Goal: Verbalizes/displays adequate comfort level or baseline comfort level  7/31/2025 1852 by Marie Vanegas, RN  Outcome: Progressing     Problem: Safety - Adult  Goal: Free from fall injury  7/31/2025 1852 by Marie Vanegas, RN  Outcome: Progressing

## 2025-07-31 NOTE — CONSULTS
Department of Urology  Urology Consult Note    Patient:  Mariangel Abreu  MRN: [id number]  YOB: 1950    Reason for Consult: Urinary incontinence  Requesting Physician: Dr. Hoyos    CHIEF COMPLAINT:    Chief Complaint   Patient presents with    Chest Pain    Shortness of Breath       History Obtained From:   patient, electronic medical record    HISTORY OF PRESENT ILLNESS:    The patient is a 74 y.o. female who is admitted to the hospital for the management of chest pain.  Urology was asked to evaluate the patient for urinary incontinence.  Patient was supposed to be evaluated as an outpatient with Dr. Moss.  She states a chronic history of urinary incontinence.  She describes her symptoms as mixed incontinence, urgency is worse than stress.  She voids frequently throughout the day, she is up 8-9 times at night.  She does limit her fluids prior to bed, she is compliant with her CPAP.  She is wearing up to 10 briefs/pad combo in 24 hours. She does admit drinking 2 cups of coffee, otherwise usually just drinking water.   She has been started on oxybutynin 5 mg p.o. daily as an outpatient, it is not clear how long she has been taking this.  Her UA this admission shows small amount of glucose, 3-5RBC. She is extremely frustrated with her urinary symptoms.     Past Medical History:        Diagnosis Date    Abdominal bloating 01/26/2021    Adenomatous polyp of ascending colon 01/26/2021    Allergic rhinitis     Anxiety 07/17/2013    Arthritis     Asthma     Atrial fibrillation ([facility])     Back pain     NERVE/DR. GRACIA    Benign hypertension with CKD (chronic kidney disease) stage III ([facility])     CAD (coronary artery disease) 03/21/2013    Caffeine use     2 coffee/day    CHF (congestive heart failure) ([facility])     Chronic kidney disease     CKD (chronic kidney disease) stage 3, GFR 30-59 ml/min ([facility])     Claudication 6/2/2024    COPD (chronic obstructive pulmonary disease) ([facility])     emphysema    Degeneration  hyperglycemia (HCC)    Hyperglycemia    CAD S/P percutaneous coronary angioplasty    TIA (transient ischemic attack)    Facial pain, acute    Type 2 diabetes mellitus with hyperglycemia, with long-term current use of insulin (HCC)    Uncontrolled blood glucose    Elevated erythrocyte sedimentation rate    Elevated C-reactive protein (CRP)    Chest pain    Sprain of temporomandibular joint or ligament    Neck pain    Unstable angina (HCC)    Syncope and collapse    Moderate malnutrition    S/P cardiac catheterization    Coronary artery disease due to calcified coronary lesion    S/P angioplasty with stent       Plan:   74-year-old female admitted for chest pain, urology consult for urinary incontinence.  This is a chronic issue, she was supposed to be seen as a new patient today in our office.  UA shows small amount of glucose and 3-5 RBC.    - CT this admission: tiny non-obstructing right renal stones.    - elective OP cysto   Would check PVR, if okay, increase oxybutynin to 10 mg p.o. daily.  Okay to use PureWick while admitted to the hospital.   F/u 6 weeks as outpatient to reassess her urinary symptoms  If no better, consider trialing another medication versus cystoscopy/ UDS and consideration of third line therapies    Thank you for the consultation.  Please call with any questions.    Electronically signed by VERONIKA Marsh CNP on 7/31/2025 at 9:12 AM

## 2025-08-01 LAB
ANION GAP SERPL CALCULATED.3IONS-SCNC: 11 MMOL/L (ref 9–16)
BUN SERPL-MCNC: 28 MG/DL (ref 8–23)
CALCIUM SERPL-MCNC: 8.7 MG/DL (ref 8.6–10.4)
CHLORIDE SERPL-SCNC: 101 MMOL/L (ref 98–107)
CO2 SERPL-SCNC: 25 MMOL/L (ref 20–31)
CREAT SERPL-MCNC: 1.2 MG/DL (ref 0.7–1.2)
EKG ATRIAL RATE: 60 BPM
EKG ATRIAL RATE: 75 BPM
EKG P AXIS: 51 DEGREES
EKG P AXIS: 69 DEGREES
EKG P-R INTERVAL: 156 MS
EKG P-R INTERVAL: 156 MS
EKG Q-T INTERVAL: 378 MS
EKG Q-T INTERVAL: 386 MS
EKG QRS DURATION: 76 MS
EKG QRS DURATION: 82 MS
EKG QTC CALCULATION (BAZETT): 386 MS
EKG QTC CALCULATION (BAZETT): 422 MS
EKG R AXIS: -43 DEGREES
EKG R AXIS: -50 DEGREES
EKG T AXIS: 65 DEGREES
EKG T AXIS: 82 DEGREES
EKG VENTRICULAR RATE: 60 BPM
EKG VENTRICULAR RATE: 75 BPM
GFR, ESTIMATED: 47 ML/MIN/1.73M2
GLUCOSE BLD-MCNC: 126 MG/DL (ref 65–105)
GLUCOSE SERPL-MCNC: 125 MG/DL (ref 74–99)
MAGNESIUM SERPL-MCNC: 1.9 MG/DL (ref 1.6–2.4)
POTASSIUM SERPL-SCNC: 4.1 MMOL/L (ref 3.7–5.3)
SODIUM SERPL-SCNC: 137 MMOL/L (ref 136–145)

## 2025-08-01 PROCEDURE — 6370000000 HC RX 637 (ALT 250 FOR IP): Performed by: NURSE PRACTITIONER

## 2025-08-01 PROCEDURE — 96361 HYDRATE IV INFUSION ADD-ON: CPT

## 2025-08-01 PROCEDURE — 2580000003 HC RX 258

## 2025-08-01 PROCEDURE — 94761 N-INVAS EAR/PLS OXIMETRY MLT: CPT

## 2025-08-01 PROCEDURE — 94660 CPAP INITIATION&MGMT: CPT

## 2025-08-01 PROCEDURE — 83735 ASSAY OF MAGNESIUM: CPT

## 2025-08-01 PROCEDURE — 2500000003 HC RX 250 WO HCPCS

## 2025-08-01 PROCEDURE — 80048 BASIC METABOLIC PNL TOTAL CA: CPT

## 2025-08-01 PROCEDURE — G0378 HOSPITAL OBSERVATION PER HR: HCPCS

## 2025-08-01 PROCEDURE — 93005 ELECTROCARDIOGRAM TRACING: CPT

## 2025-08-01 PROCEDURE — 93010 ELECTROCARDIOGRAM REPORT: CPT | Performed by: INTERNAL MEDICINE

## 2025-08-01 PROCEDURE — 6370000000 HC RX 637 (ALT 250 FOR IP)

## 2025-08-01 PROCEDURE — 99233 SBSQ HOSP IP/OBS HIGH 50: CPT | Performed by: SURGERY

## 2025-08-01 PROCEDURE — 36415 COLL VENOUS BLD VENIPUNCTURE: CPT

## 2025-08-01 PROCEDURE — 94640 AIRWAY INHALATION TREATMENT: CPT

## 2025-08-01 PROCEDURE — 97110 THERAPEUTIC EXERCISES: CPT

## 2025-08-01 PROCEDURE — 99223 1ST HOSP IP/OBS HIGH 75: CPT

## 2025-08-01 PROCEDURE — 2700000000 HC OXYGEN THERAPY PER DAY

## 2025-08-01 PROCEDURE — 82947 ASSAY GLUCOSE BLOOD QUANT: CPT

## 2025-08-01 RX ORDER — CYCLOBENZAPRINE HCL 10 MG
10 TABLET ORAL 3 TIMES DAILY PRN
Status: DISCONTINUED | OUTPATIENT
Start: 2025-08-01 | End: 2025-08-02 | Stop reason: HOSPADM

## 2025-08-01 RX ORDER — OXYCODONE HYDROCHLORIDE 5 MG/1
5 TABLET ORAL ONCE
Refills: 0 | Status: COMPLETED | OUTPATIENT
Start: 2025-08-01 | End: 2025-08-01

## 2025-08-01 RX ORDER — LIDOCAINE 4 G/G
1 PATCH TOPICAL DAILY
Status: DISCONTINUED | OUTPATIENT
Start: 2025-08-01 | End: 2025-08-02 | Stop reason: HOSPADM

## 2025-08-01 RX ADMIN — INSULIN GLARGINE 45 UNITS: 100 INJECTION, SOLUTION SUBCUTANEOUS at 21:06

## 2025-08-01 RX ADMIN — CYCLOBENZAPRINE 10 MG: 10 TABLET, FILM COATED ORAL at 13:48

## 2025-08-01 RX ADMIN — CYCLOBENZAPRINE 10 MG: 10 TABLET, FILM COATED ORAL at 21:08

## 2025-08-01 RX ADMIN — SODIUM CHLORIDE: 0.9 INJECTION, SOLUTION INTRAVENOUS at 00:43

## 2025-08-01 RX ADMIN — BUDESONIDE AND FORMOTEROL FUMARATE DIHYDRATE 1 PUFF: 160; 4.5 AEROSOL RESPIRATORY (INHALATION) at 11:07

## 2025-08-01 RX ADMIN — TICAGRELOR 90 MG: 90 TABLET ORAL at 08:03

## 2025-08-01 RX ADMIN — ACETAMINOPHEN 650 MG: 325 TABLET ORAL at 21:07

## 2025-08-01 RX ADMIN — Medication 3 MG: at 21:08

## 2025-08-01 RX ADMIN — INSULIN GLARGINE 50 UNITS: 100 INJECTION, SOLUTION SUBCUTANEOUS at 08:05

## 2025-08-01 RX ADMIN — ASPIRIN 81 MG: 81 TABLET, CHEWABLE ORAL at 08:04

## 2025-08-01 RX ADMIN — PANTOPRAZOLE SODIUM 40 MG: 40 TABLET, DELAYED RELEASE ORAL at 21:07

## 2025-08-01 RX ADMIN — ATORVASTATIN CALCIUM 80 MG: 80 TABLET, FILM COATED ORAL at 08:04

## 2025-08-01 RX ADMIN — MIDODRINE HYDROCHLORIDE 2.5 MG: 2.5 TABLET ORAL at 17:05

## 2025-08-01 RX ADMIN — GABAPENTIN 100 MG: 100 CAPSULE ORAL at 08:04

## 2025-08-01 RX ADMIN — INSULIN LISPRO 2 UNITS: 100 INJECTION, SOLUTION INTRAVENOUS; SUBCUTANEOUS at 21:05

## 2025-08-01 RX ADMIN — TICAGRELOR 90 MG: 90 TABLET ORAL at 21:06

## 2025-08-01 RX ADMIN — OXYBUTYNIN CHLORIDE 10 MG: 10 TABLET, EXTENDED RELEASE ORAL at 21:08

## 2025-08-01 RX ADMIN — RANOLAZINE 1000 MG: 500 TABLET, EXTENDED RELEASE ORAL at 21:07

## 2025-08-01 RX ADMIN — PANTOPRAZOLE SODIUM 40 MG: 40 TABLET, DELAYED RELEASE ORAL at 08:04

## 2025-08-01 RX ADMIN — RANOLAZINE 1000 MG: 500 TABLET, EXTENDED RELEASE ORAL at 08:03

## 2025-08-01 RX ADMIN — ACETAMINOPHEN 650 MG: 325 TABLET ORAL at 06:46

## 2025-08-01 RX ADMIN — MIDODRINE HYDROCHLORIDE 2.5 MG: 2.5 TABLET ORAL at 12:15

## 2025-08-01 RX ADMIN — METOPROLOL TARTRATE 12.5 MG: 25 TABLET, FILM COATED ORAL at 21:08

## 2025-08-01 RX ADMIN — SODIUM CHLORIDE, PRESERVATIVE FREE 10 ML: 5 INJECTION INTRAVENOUS at 08:04

## 2025-08-01 RX ADMIN — ESCITALOPRAM OXALATE 20 MG: 20 TABLET, FILM COATED ORAL at 08:04

## 2025-08-01 RX ADMIN — NITROGLYCERIN 0.4 MG: 0.4 TABLET SUBLINGUAL at 06:46

## 2025-08-01 RX ADMIN — MIDODRINE HYDROCHLORIDE 2.5 MG: 2.5 TABLET ORAL at 08:04

## 2025-08-01 RX ADMIN — POLYETHYLENE GLYCOL 3350 17 G: 17 POWDER, FOR SOLUTION ORAL at 12:15

## 2025-08-01 RX ADMIN — GABAPENTIN 100 MG: 100 CAPSULE ORAL at 21:08

## 2025-08-01 RX ADMIN — TOPIRAMATE 100 MG: 100 TABLET, FILM COATED ORAL at 21:08

## 2025-08-01 RX ADMIN — ISOSORBIDE MONONITRATE 90 MG: 60 TABLET, EXTENDED RELEASE ORAL at 21:08

## 2025-08-01 RX ADMIN — INSULIN LISPRO 2 UNITS: 100 INJECTION, SOLUTION INTRAVENOUS; SUBCUTANEOUS at 17:04

## 2025-08-01 RX ADMIN — OXYCODONE HYDROCHLORIDE 5 MG: 5 TABLET ORAL at 08:03

## 2025-08-01 ASSESSMENT — ENCOUNTER SYMPTOMS
ABDOMINAL PAIN: 0
WHEEZING: 0
STRIDOR: 0
CHOKING: 0
NAUSEA: 1
DIARRHEA: 0
CHEST TIGHTNESS: 1
SHORTNESS OF BREATH: 1
COUGH: 0
VOMITING: 0

## 2025-08-01 ASSESSMENT — PAIN SCALES - GENERAL
PAINLEVEL_OUTOF10: 2
PAINLEVEL_OUTOF10: 2
PAINLEVEL_OUTOF10: 9
PAINLEVEL_OUTOF10: 9
PAINLEVEL_OUTOF10: 8
PAINLEVEL_OUTOF10: 9
PAINLEVEL_OUTOF10: 2
PAINLEVEL_OUTOF10: 9
PAINLEVEL_OUTOF10: 7
PAINLEVEL_OUTOF10: 9
PAINLEVEL_OUTOF10: 7
PAINLEVEL_OUTOF10: 8
PAINLEVEL_OUTOF10: 8

## 2025-08-01 ASSESSMENT — PAIN DESCRIPTION - DESCRIPTORS
DESCRIPTORS: ACHING
DESCRIPTORS: OTHER (COMMENT)
DESCRIPTORS: ACHING

## 2025-08-01 ASSESSMENT — PAIN DESCRIPTION - LOCATION
LOCATION: RIB CAGE
LOCATION: CHEST
LOCATION: CHEST
LOCATION: CHEST;HEAD
LOCATION: CHEST

## 2025-08-01 ASSESSMENT — PAIN DESCRIPTION - ORIENTATION
ORIENTATION: LEFT

## 2025-08-01 NOTE — PLAN OF CARE
Problem: Chronic Conditions and Co-morbidities  Goal: Patient's chronic conditions and co-morbidity symptoms are monitored and maintained or improved  8/1/2025 0421 by Tierra Barraza, RN  Outcome: Progressing  Flowsheets (Taken 7/31/2025 2000)  Care Plan - Patient's Chronic Conditions and Co-Morbidity Symptoms are Monitored and Maintained or Improved:   Monitor and assess patient's chronic conditions and comorbid symptoms for stability, deterioration, or improvement   Collaborate with multidisciplinary team to address chronic and comorbid conditions and prevent exacerbation or deterioration   Update acute care plan with appropriate goals if chronic or comorbid symptoms are exacerbated and prevent overall improvement and discharge     Problem: Discharge Planning  Goal: Discharge to home or other facility with appropriate resources  8/1/2025 0421 by Tierra Barraza, RN  Outcome: Progressing  Flowsheets (Taken 7/31/2025 2000)  Discharge to home or other facility with appropriate resources:   Identify barriers to discharge with patient and caregiver   Arrange for needed discharge resources and transportation as appropriate   Identify discharge learning needs (meds, wound care, etc)     Problem: Pain  Goal: Verbalizes/displays adequate comfort level or baseline comfort level  8/1/2025 0421 by Tierra Barraza, RN  Outcome: Progressing     Problem: Safety - Adult  Goal: Free from fall injury  8/1/2025 0421 by Tierra Barraza, RN  Outcome: Progressing

## 2025-08-01 NOTE — PROGRESS NOTES
[facility]   OCCUPATIONAL THERAPY MISSED TREATMENT NOTE   INPATIENT   Date: 25  Patient Name: Mariangel Abreu       Room:   MRN: [id number]   Account #: [id number]    : 1950  (74 y.o.)  Gender: female                 REASON FOR MISSED TREATMENT:  25   -    Refusal by Patient - pt states \"I'm so tired and just don't feel like it.\" This writer attempted to educate pt on importance of participation with occupational therapy however pt stated \"I already had my therapy today\" this writer clarified that occupational and physical therapy focus on different things, however pt continued to refuse. OT will continue to follow.    1423        Electronically signed by LINDSAY Byrd on 25 at 2:33 PM EDT

## 2025-08-01 NOTE — PROGRESS NOTES
Pt reporting worsening fluttering chest pain, rates it 9/10 (up from 7/10). had two doses of nitro overnight, last at 0646. /61 HR 59. Provider notified. EKG done and provider at bedside. Mag recheck 1.9. Chest pain is reproducible, roxicodone 5mg ordered

## 2025-08-01 NOTE — CARE COORDINATION
Case Management   Daily Progress Note       Patient Name: Mariangel Abreu                   YOB: 1950  Diagnosis: Chest pain [R07.9]  Hyperglycemia [R73.9]  YAJAIRA (acute kidney injury) [N17.9]  Chest pain, unspecified type [R07.9]                         days  Length of Stay: 0  days    Readmission Risk (Low < 19, Mod (19-27), High > 27): Readmission Risk Score: 32.9      Patient is alert and oriented.    Spoke with Patient, and Current Transitional Plan is:    [] Home Independently    [x] Home with HC - pt requested [facility] as she has used them in the past. Referral sent via [employer].    [] Skilled Nursing Facility    [] Acute Rehabilitation    [] Long Term Acute Care (LTAC)    [] Other:      Additional Notes:     Cre improved at 1.2 today.    Per notes, ongoing chest pain is likely musculoskeletal. Ok to d/c home per cardiology.    Per urology notes, follow up 6 weeks OP.    Anticipate d/c home today.                        Post Acute Facility/Agency List     Provided patient with the following list, the list includes the overall star ratings obtained from CMS per the Medicare Web site (www.Medicare.gov):     [] Long Term Acute Care Facilities  [] Acute Inpatient Rehabilitation Facilities  [] Skilled Nursing Facilities  [] Hospice Facilities  [x] Home Care    Provided verbal instructions on how to utilize the QR Code to obtain additional detailed star ratings from www.Medicare.gov     offered to print and provide the detailed list:    []Accepted   [x]Declined    Pt declined list as she has had [facility] in the past and would like them again.    Electronically signed by Yuni Jones RN on 8/1/2025 at 10:58 AM     Received message from Dianna at [facility] stating pt has been accepted for [facility] and will likely d/c today.    Electronically signed by Yuni Jones RN on 8/1/2025 at 11:24 AM

## 2025-08-01 NOTE — PLAN OF CARE
Problem: Chronic Conditions and Co-morbidities  Goal: Patient's chronic conditions and co-morbidity symptoms are monitored and maintained or improved  8/1/2025 1329 by Urmila Mackey RN  Outcome: Progressing  8/1/2025 0421 by Tierra Barraza RN  Outcome: Progressing  Flowsheets (Taken 7/31/2025 2000)  Care Plan - Patient's Chronic Conditions and Co-Morbidity Symptoms are Monitored and Maintained or Improved:   Monitor and assess patient's chronic conditions and comorbid symptoms for stability, deterioration, or improvement   Collaborate with multidisciplinary team to address chronic and comorbid conditions and prevent exacerbation or deterioration   Update acute care plan with appropriate goals if chronic or comorbid symptoms are exacerbated and prevent overall improvement and discharge     Problem: Discharge Planning  Goal: Discharge to home or other facility with appropriate resources  8/1/2025 1329 by Urmila Mackey RN  Outcome: Progressing  8/1/2025 0421 by Tierra Barraza RN  Outcome: Progressing  Flowsheets (Taken 7/31/2025 2000)  Discharge to home or other facility with appropriate resources:   Identify barriers to discharge with patient and caregiver   Arrange for needed discharge resources and transportation as appropriate   Identify discharge learning needs (meds, wound care, etc)     Problem: Pain  Goal: Verbalizes/displays adequate comfort level or baseline comfort level  8/1/2025 1329 by Urmila Mackey RN  Outcome: Progressing  8/1/2025 0421 by Tierra Barraza RN  Outcome: Progressing     Problem: Safety - Adult  Goal: Free from fall injury  8/1/2025 1329 by Urmila Mackey RN  Outcome: Progressing  8/1/2025 0421 by Tierra Barraza RN  Outcome: Progressing

## 2025-08-01 NOTE — PROGRESS NOTES
Physical Therapy  [facility]   Physical Therapy Treatment  Date: 25  Patient Name: Mariangel Abreu       Room: -  MRN: [id number]  Account: [id number]   : 1950  (74 y.o.) Gender: female     Discharge Recommendations:  Discharge Recommendations: Home with assist PRN, Therapy recommended at discharge     General  Additional Pertinent Hx: Mariangel Abreu is a 74 y.o. Non- / non  female   has a significant medical history including anxiety, asthma,CAD with multiple stent placements, CKD, GERD, hypertension, and hyperlipidemia.      Patient presented with a complaint of chest pain.Patient states that she was sitting on the edge of her bed when it started with a sudden onset. Patient states that the pain was sharp in nature, and nonradiating.  Patient states that she took a nitroglycerin at home and the pain subsided.  However about 10 minutes later the second episode of pain began which prompted her to call EMS.     Upon assessment in the ED the patient is still having some moderate chest pain but is nowhere near as bad as before.  Patient does endorse some shortness of breath, and states she uses 2 L of nasal cannula oxygen as needed at home.     Patient underwent heart cath in 2025 at which time her left circumflex artery was stented with improvement, her OM1 was also stented but had no improvement in flow.  Patient's chest wall is tender to palpation patient is EKG was interpreted by the ED physician that shows no acute changes in her ST segments and no acute change in her T waves.  Patient's labs are notable for an elevated creatinine of 1.6 with baseline being around 1.2.  Patient also has a elevated blood glucose of 532, also presenting pseudohyponatremia.     Patient's hyperglycemia was treated with a 500 mL bolus of normal saline in the ER along with 5 units of short acting insulin.  Patient states that the chest pain is waxing and waning    Safety Devices  Type of Devices: All fall risk precautions in place, Call light within reach, Bed alarm in place, Left in bed, Patient at risk for falls, Gait belt (Pt left in bed at end of session.)     PT Individual Minutes  Time In: 0829  Time Out: 0858  Minutes: 29         Electronically signed by Felecia Ritter PTA on 8/1/25 at 9:55 AM EDT

## 2025-08-01 NOTE — PROGRESS NOTES
[facility] Internal Medicine  Mejia Kelly MD; Clifford Albrecht MD, Ivana Orellana MD,    Rose Buckley MD, Jhony Hoyos MD.    [facility] Internal Medicine   IN-PATIENT SERVICE   [facility]  Progress Note            Date:   8/1/2025  Patient name:  Mariangel Abreu  Date of admission:  7/29/2025 11:14 PM  MRN:   [id number]  Account:  [id number]  YOB: 1950  PCP:    Nuzhat Ramos DTR  Room:   2123/2123-01  Code Status:    Full Code    Chief Complaint:     Chief Complaint   Patient presents with    Chest Pain    Shortness of Breath       History Obtained From:     Patient/EMR/Bedside RN    History of Present Illness:   Mariangel Abreu is a 74 y.o. Non- / non  female   has a significant medical history including anxiety, asthma,CAD with multiple stent placements, CKD, GERD, hypertension, and hyperlipidemia.     Patient presented with a complaint of chest pain.Patient states that she was sitting on the edge of her bed when it started with a sudden onset. Patient states that the pain was sharp in nature, and nonradiating.  Patient states that she took a nitroglycerin at home and the pain subsided.  However about 10 minutes later the second episode of pain began which prompted her to call EMS.     Upon assessment in the ED the patient is still having some moderate chest pain but is nowhere near as bad as before.  Patient does endorse some shortness of breath, and states she uses 2 L of nasal cannula oxygen as needed at home.     Patient underwent heart cath in April 2025 at which time her left circumflex artery was stented with improvement, her OM1 was also stented but had no improvement in flow.  Patient's chest wall is tender to palpation patient is EKG was interpreted by the ED physician that shows no acute changes in her ST segments and no acute change in her T waves.  Patient's labs are notable for an elevated creatinine of 1.6 with  Glom Filt Rate 43 (L) >60 mL/min/1.73m2    Calcium 9.0 8.6 - 10.4 mg/dL   POC Glucose Fingerstick    Collection Time: 07/31/25  7:49 PM   Result Value Ref Range    POC Glucose 278 (H) 65 - 105 mg/dL   Basic Metabolic Panel    Collection Time: 08/01/25  5:46 AM   Result Value Ref Range    Sodium 137 136 - 145 mmol/L    Potassium 4.1 3.7 - 5.3 mmol/L    Chloride 101 98 - 107 mmol/L    CO2 25 20 - 31 mmol/L    Anion Gap 11 9 - 16 mmol/L    Glucose 125 (H) 74 - 99 mg/dL    BUN 28 (H) 8 - 23 mg/dL    Creatinine 1.2 0.7 - 1.2 mg/dL    Est, Glom Filt Rate 47 (L) >60 mL/min/1.73m2    Calcium 8.7 8.6 - 10.4 mg/dL   Magnesium    Collection Time: 08/01/25  5:46 AM   Result Value Ref Range    Magnesium 1.9 1.6 - 2.4 mg/dL   POC Glucose Fingerstick    Collection Time: 08/01/25  5:56 AM   Result Value Ref Range    POC Glucose 126 (H) 65 - 105 mg/dL   EKG 12 Lead    Collection Time: 08/01/25  7:34 AM   Result Value Ref Range    Ventricular Rate 60 BPM    Atrial Rate 60 BPM    P-R Interval 156 ms    QRS Duration 82 ms    Q-T Interval 386 ms    QTc Calculation (Bazett) 386 ms    P Axis 69 degrees    R Axis -50 degrees    T Axis 65 degrees       Imaging/Diagnostics:  XR CHEST PORTABLE  Result Date: 7/30/2025  Atelectatic changes in the lower lungs, greater on the right.     XR CHEST PORTABLE  Result Date: 7/26/2025  1. Mild patchy infiltrates and/or atelectatic change in the base of the right lung, similar to findings on previous chest x-ray on 07/06/2025. 2. No new abnormality.     CT ABDOMEN PELVIS WO CONTRAST Additional Contrast? None  Result Date: 7/26/2025  1. No acute intra-abdominal or pelvic abnormality. 2. Tiny nonobstructing right renal calculus. 3. Left renal cysts. 4. Diverticulosis of the left colon without evidence of diverticulitis. 5. Normal appendix. 6. No evidence of bowel obstruction or free air. 7. No evidence of obstructive uropathy. 8. No evidence of fracture or osseous malalignment in the lumbar spine or pelvic

## 2025-08-01 NOTE — PROGRESS NOTES
[facility]   OCCUPATIONAL THERAPY MISSED TREATMENT NOTE   INPATIENT   Date: 25  Patient Name: Mariangel Abreu       Room:   MRN: [id number]   Account #: [id number]    : 1950  (74 y.o.)  Gender: female                 REASON FOR MISSED TREATMENT:  25   -   Other - pt currently unavailable, working with physical therapy. OT will continue to follow and check back later in AM or PM as time permits.     0857        Electronically signed by LINDSAY Byrd on 25 at 9:02 AM EDT

## 2025-08-01 NOTE — PROGRESS NOTES
[facility]  Progress Note                   Date:   8/1/2025  Patient name: Mariangel Abreu  Date of admission:  7/29/2025 11:14 PM  MRN:   [id number]  YOB: 1950  PCP: Nuzhat Ramos DTR    Reason for Admission: Chest pain [R07.9]  Hyperglycemia [R73.9]  YAJAIRA (acute kidney injury) [N17.9]  Chest pain, unspecified type [R07.9]    Subjective:       Clinical Changes /Abnormalities:Patient seen and examined.  No distress ongoing midsternal reproducible chest pain patient had a multiple mechanical falls in past tele/vitals/labs reviewed .         Known to us with last note from prior admission for CP 6/2025:  Atypical CP  Known CAD/MVD with recent PCI as above  Mechanical fall  Preserved LVEF on echo  Morbid obesity with BMI >40  HTN  COPD/HIGINIO  Chronic back pain  DM2    Review of Systems    Medications:   Scheduled Meds:   oxyBUTYnin  10 mg Oral Nightly    isosorbide mononitrate  90 mg Oral Nightly    atorvastatin  80 mg Oral Daily    budesonide-formoterol  1 puff Inhalation Daily RT    escitalopram  20 mg Oral Daily    gabapentin  100 mg Oral BID    metoprolol tartrate  12.5 mg Oral BID    midodrine  2.5 mg Oral TID WC    pantoprazole  40 mg Oral BID    ranolazine  1,000 mg Oral BID    ticagrelor  90 mg Oral BID    topiramate  100 mg Oral Daily    insulin glargine  50 Units SubCUTAneous QAM    And    insulin glargine  45 Units SubCUTAneous Nightly    insulin lispro  0-8 Units SubCUTAneous 4x Daily AC & HS    aspirin  81 mg Oral Daily     Continuous Infusions:   sodium chloride      dextrose       CBC:   Recent Labs     07/30/25  0045 07/31/25  0544   WBC 7.2 6.5   HGB 14.0 13.3    180     BMP:    Recent Labs     07/31/25  0544 07/31/25  1742 08/01/25  0546    131* 137   K 4.2 5.1 4.1   CL 99 98 101   CO2 28 22 25   BUN 29* 28* 28*   CREATININE 1.4* 1.3* 1.2   GLUCOSE 240* 287* 125*     Hepatic:No results for input(s): \"AST\", \"ALT\", \"BILITOT\", \"ALKPHOS\" in the last 72

## 2025-08-02 VITALS
SYSTOLIC BLOOD PRESSURE: 122 MMHG | BODY MASS INDEX: 41.59 KG/M2 | RESPIRATION RATE: 18 BRPM | HEART RATE: 62 BPM | OXYGEN SATURATION: 100 % | HEIGHT: 62 IN | WEIGHT: 226 LBS | DIASTOLIC BLOOD PRESSURE: 74 MMHG | TEMPERATURE: 97.1 F

## 2025-08-02 PROCEDURE — G0378 HOSPITAL OBSERVATION PER HR: HCPCS

## 2025-08-02 PROCEDURE — 6370000000 HC RX 637 (ALT 250 FOR IP)

## 2025-08-02 PROCEDURE — 99239 HOSP IP/OBS DSCHRG MGMT >30: CPT

## 2025-08-02 PROCEDURE — 94640 AIRWAY INHALATION TREATMENT: CPT

## 2025-08-02 PROCEDURE — 2700000000 HC OXYGEN THERAPY PER DAY

## 2025-08-02 PROCEDURE — 94761 N-INVAS EAR/PLS OXIMETRY MLT: CPT

## 2025-08-02 RX ORDER — CYCLOBENZAPRINE HCL 10 MG
10 TABLET ORAL 3 TIMES DAILY PRN
Qty: 20 TABLET | Refills: 0 | Status: SHIPPED | OUTPATIENT
Start: 2025-08-02 | End: 2025-08-12

## 2025-08-02 RX ORDER — ASPIRIN 81 MG/1
81 TABLET ORAL DAILY
Qty: 30 TABLET | Refills: 0 | Status: SHIPPED | OUTPATIENT
Start: 2025-08-02

## 2025-08-02 RX ORDER — ISOSORBIDE MONONITRATE 30 MG/1
90 TABLET, EXTENDED RELEASE ORAL DAILY
Qty: 90 TABLET | Refills: 0 | Status: SHIPPED | OUTPATIENT
Start: 2025-08-02

## 2025-08-02 RX ADMIN — PANTOPRAZOLE SODIUM 40 MG: 40 TABLET, DELAYED RELEASE ORAL at 09:01

## 2025-08-02 RX ADMIN — BUDESONIDE AND FORMOTEROL FUMARATE DIHYDRATE 1 PUFF: 160; 4.5 AEROSOL RESPIRATORY (INHALATION) at 08:06

## 2025-08-02 RX ADMIN — RANOLAZINE 1000 MG: 500 TABLET, EXTENDED RELEASE ORAL at 09:02

## 2025-08-02 RX ADMIN — MIDODRINE HYDROCHLORIDE 2.5 MG: 2.5 TABLET ORAL at 09:03

## 2025-08-02 RX ADMIN — ASPIRIN 81 MG: 81 TABLET, CHEWABLE ORAL at 09:02

## 2025-08-02 RX ADMIN — TICAGRELOR 90 MG: 90 TABLET ORAL at 09:02

## 2025-08-02 RX ADMIN — ATORVASTATIN CALCIUM 80 MG: 80 TABLET, FILM COATED ORAL at 09:01

## 2025-08-02 RX ADMIN — INSULIN GLARGINE 50 UNITS: 100 INJECTION, SOLUTION SUBCUTANEOUS at 09:09

## 2025-08-02 RX ADMIN — INSULIN LISPRO 2 UNITS: 100 INJECTION, SOLUTION INTRAVENOUS; SUBCUTANEOUS at 11:46

## 2025-08-02 RX ADMIN — METOPROLOL TARTRATE 12.5 MG: 25 TABLET, FILM COATED ORAL at 09:00

## 2025-08-02 RX ADMIN — INSULIN LISPRO 2 UNITS: 100 INJECTION, SOLUTION INTRAVENOUS; SUBCUTANEOUS at 16:35

## 2025-08-02 RX ADMIN — GABAPENTIN 100 MG: 100 CAPSULE ORAL at 09:02

## 2025-08-02 RX ADMIN — ESCITALOPRAM OXALATE 20 MG: 20 TABLET, FILM COATED ORAL at 09:02

## 2025-08-02 ASSESSMENT — ENCOUNTER SYMPTOMS
STRIDOR: 0
COUGH: 0
SHORTNESS OF BREATH: 1
CHOKING: 0
WHEEZING: 0
DIARRHEA: 0
NAUSEA: 1
CHEST TIGHTNESS: 1
ABDOMINAL PAIN: 0
VOMITING: 0

## 2025-08-02 NOTE — PLAN OF CARE
Problem: Chronic Conditions and Co-morbidities  Goal: Patient's chronic conditions and co-morbidity symptoms are monitored and maintained or improved  8/2/2025 0925 by Mila Brock RN  Outcome: Progressing  8/2/2025 0459 by Tierra Barraza RN  Outcome: Progressing  Flowsheets (Taken 8/1/2025 2000)  Care Plan - Patient's Chronic Conditions and Co-Morbidity Symptoms are Monitored and Maintained or Improved:   Monitor and assess patient's chronic conditions and comorbid symptoms for stability, deterioration, or improvement   Collaborate with multidisciplinary team to address chronic and comorbid conditions and prevent exacerbation or deterioration   Update acute care plan with appropriate goals if chronic or comorbid symptoms are exacerbated and prevent overall improvement and discharge     Problem: Discharge Planning  Goal: Discharge to home or other facility with appropriate resources  8/2/2025 0925 by Mila Brock RN  Outcome: Progressing  8/2/2025 0459 by Tierra Barraza RN  Outcome: Progressing  Flowsheets (Taken 8/1/2025 2000)  Discharge to home or other facility with appropriate resources:   Identify barriers to discharge with patient and caregiver   Arrange for needed discharge resources and transportation as appropriate   Identify discharge learning needs (meds, wound care, etc)     Problem: Pain  Goal: Verbalizes/displays adequate comfort level or baseline comfort level  8/2/2025 0925 by Mila Brock RN  Outcome: Progressing  8/2/2025 0459 by Tierra Barraza RN  Outcome: Progressing     Problem: Safety - Adult  Goal: Free from fall injury  8/2/2025 0925 by Mila Brock RN  Outcome: Progressing  8/2/2025 0459 by Tierra Barraza RN  Outcome: Progressing

## 2025-08-02 NOTE — DISCHARGE SUMMARY
[facility] Internal Medicine    Mejia Kelly MD; Clifford Albrecht MD, Ivana Orellana MD,Dr. IGNACIO Hoyos MD. ; Rose Buckley MD      [facility] Internal Medicine  IN-PATIENT SERVICE   [facility]    Discharge Summary     Patient ID: Mariangel Abreu  :  1950   MRN: [id number]     ACCOUNT:  [id number]   Patient's PCP: Nuzhat Ramos DTR  Admit Date: 2025   Discharge Date: 2025     Length of Stay: 0  Code Status:  Full Code  Admitting Physician: Jhony Hoyos MD  Discharge Physician: Lluvia Pantoja MD     Active Discharge Diagnoses:     Hospital Problem Lists:  Principal Problem:    Chest pain  Resolved Problems:    * No resolved hospital problems. *      Admission Condition:  Serious      Discharged Condition: Stable     Hospital Stay:     Hospital Course:  Mariangel Abreu is a 74 y.o. female who was admitted for the management of   Chest pain , presented to ER with Chest Pain and Shortness of Breath    74-year-old female with past medical history of anxiety, asthma, CAD with multiple stent placement, CKD, GERD, hypertension and hyperlipidemia presented to the ED with chief complaints of chest pain.  Chest pain was sudden in onset, sharp, nonradiating.  Patient recently underwent cardiac cath in 2025 during which her OM was stented.  She was started on aspirin and Brilinta.  Cardiology was consulted, echo was done which showed EF of 55 to 60% with mildly increased wall thickness and indeterminate diastolic dysfunction.  Her Imdur was increased to 90 mg.  She was continued on Ranexa.  Patient's pain was also thought to be secondary to musculoskeletal pain.  She was given Flexeril.  Her pain improved and she was discharged home.    On examination,  Alert awake oriented x3,  S1-S2 present,  CTA bilateral,  Abdomen soft nontender nondistended bowel sounds present   Extremity no edema no calf tenderness,,  Skin no rash  CNS no focal neurological

## 2025-08-02 NOTE — CARE COORDINATION
Case Management   Daily Progress Note       Patient Name: Mariangel Abreu                   YOB: 1950  Diagnosis: Chest pain [R07.9]  Hyperglycemia [R73.9]  YAJAIRA (acute kidney injury) [N17.9]  Chest pain, unspecified type [R07.9]                         days  Length of Stay: 0  days    Readmission Risk (Low < 19, Mod (19-27), High > 27): Readmission Risk Score: 32.9      Chart Reviewed, and Current Transitional Plan is:    [] Home Independently    [x] Home with  - [facility].    [] Skilled Nursing Facility    [] Acute Rehabilitation    [] Long Term Acute Care (LTAC)    [] Other:     Additional Notes:     Anticipate d/c home today.    Cardiology signed off.    Electronically signed by Yuni Jones RN on 8/2/2025 at 10:36 AM     AVS uploaded to [facility] and informed [facility] that pt will d/c today.    Electronically signed by Yuni Jones RN on 8/2/2025 at 3:31 PM

## 2025-08-02 NOTE — PLAN OF CARE
Problem: Chronic Conditions and Co-morbidities  Goal: Patient's chronic conditions and co-morbidity symptoms are monitored and maintained or improved  Outcome: Progressing  Flowsheets (Taken 8/1/2025 2000)  Care Plan - Patient's Chronic Conditions and Co-Morbidity Symptoms are Monitored and Maintained or Improved:   Monitor and assess patient's chronic conditions and comorbid symptoms for stability, deterioration, or improvement   Collaborate with multidisciplinary team to address chronic and comorbid conditions and prevent exacerbation or deterioration   Update acute care plan with appropriate goals if chronic or comorbid symptoms are exacerbated and prevent overall improvement and discharge     Problem: Discharge Planning  Goal: Discharge to home or other facility with appropriate resources  Outcome: Progressing  Flowsheets (Taken 8/1/2025 2000)  Discharge to home or other facility with appropriate resources:   Identify barriers to discharge with patient and caregiver   Arrange for needed discharge resources and transportation as appropriate   Identify discharge learning needs (meds, wound care, etc)     Problem: Pain  Goal: Verbalizes/displays adequate comfort level or baseline comfort level  Outcome: Progressing     Problem: Safety - Adult  Goal: Free from fall injury  Outcome: Progressing

## 2025-08-02 NOTE — PROGRESS NOTES
[facility] Internal Medicine  Mejia Kelly MD; Clifford Albrecht MD, Ivana Orellana MD,    Rose Buckley MD, Jhony Hoyos MD.    [facility] Internal Medicine   IN-PATIENT SERVICE   [facility]  Progress Note            Date:   8/2/2025  Patient name:  Mariangel Abreu  Date of admission:  7/29/2025 11:14 PM  MRN:   [id number]  Account:  [id number]  YOB: 1950  PCP:    Nuzhat Ramos DTR  Room:   2123/2123-01  Code Status:    Full Code    Chief Complaint:     Chief Complaint   Patient presents with    Chest Pain    Shortness of Breath       History Obtained From:     Patient/EMR/Bedside RN    History of Present Illness:   Mariangel Abreu is a 74 y.o. Non- / non  female   has a significant medical history including anxiety, asthma,CAD with multiple stent placements, CKD, GERD, hypertension, and hyperlipidemia.     Patient presented with a complaint of chest pain.Patient states that she was sitting on the edge of her bed when it started with a sudden onset. Patient states that the pain was sharp in nature, and nonradiating.  Patient states that she took a nitroglycerin at home and the pain subsided.  However about 10 minutes later the second episode of pain began which prompted her to call EMS.     Upon assessment in the ED the patient is still having some moderate chest pain but is nowhere near as bad as before.  Patient does endorse some shortness of breath, and states she uses 2 L of nasal cannula oxygen as needed at home.     Patient underwent heart cath in April 2025 at which time her left circumflex artery was stented with improvement, her OM1 was also stented but had no improvement in flow.  Patient's chest wall is tender to palpation patient is EKG was interpreted by the ED physician that shows no acute changes in her ST segments and no acute change in her T waves.  Patient's labs are notable for an elevated creatinine of 1.6 with

## 2025-08-03 LAB
GLUCOSE BLD-MCNC: 113 MG/DL (ref 65–105)
GLUCOSE BLD-MCNC: 145 MG/DL (ref 65–105)
GLUCOSE BLD-MCNC: 153 MG/DL (ref 65–105)
GLUCOSE BLD-MCNC: 180 MG/DL (ref 65–105)
GLUCOSE BLD-MCNC: 188 MG/DL (ref 65–105)
GLUCOSE BLD-MCNC: 220 MG/DL (ref 65–105)
GLUCOSE BLD-MCNC: 230 MG/DL (ref 65–105)

## 2025-08-31 ENCOUNTER — APPOINTMENT (OUTPATIENT)
Dept: GENERAL RADIOLOGY | Age: 75
End: 2025-08-31
Payer: MEDICARE

## 2025-08-31 ENCOUNTER — APPOINTMENT (OUTPATIENT)
Dept: CT IMAGING | Age: 75
End: 2025-08-31
Payer: MEDICARE

## 2025-08-31 ENCOUNTER — HOSPITAL ENCOUNTER (EMERGENCY)
Age: 75
Discharge: HOME | End: 2025-08-31
Attending: EMERGENCY MEDICINE
Payer: MEDICARE

## 2025-08-31 VITALS
TEMPERATURE: 97.9 F | RESPIRATION RATE: 21 BRPM | HEART RATE: 87 BPM | BODY MASS INDEX: 41.15 KG/M2 | SYSTOLIC BLOOD PRESSURE: 142 MMHG | WEIGHT: 225 LBS | DIASTOLIC BLOOD PRESSURE: 108 MMHG | OXYGEN SATURATION: 92 %

## 2025-08-31 DIAGNOSIS — Q61.02 MULTIPLE RENAL CYSTS: ICD-10-CM

## 2025-08-31 DIAGNOSIS — R11.2 NAUSEA AND VOMITING, UNSPECIFIED VOMITING TYPE: ICD-10-CM

## 2025-08-31 DIAGNOSIS — N93.9 ABNORMAL VAGINAL BLEEDING: Primary | ICD-10-CM

## 2025-08-31 DIAGNOSIS — R10.84 GENERALIZED ABDOMINAL PAIN: ICD-10-CM

## 2025-08-31 LAB
ALBUMIN SERPL-MCNC: 4.1 G/DL (ref 3.5–5.2)
ALP SERPL-CCNC: 87 U/L (ref 35–104)
ALT SERPL-CCNC: 12 U/L (ref 10–35)
ANION GAP SERPL CALCULATED.3IONS-SCNC: 17 MMOL/L (ref 9–16)
AST SERPL-CCNC: 17 U/L (ref 10–35)
BACTERIA URNS QL MICRO: ABNORMAL
BASOPHILS # BLD: 0.09 K/UL (ref 0–0.2)
BASOPHILS NFR BLD: 1 % (ref 0–2)
BILIRUB SERPL-MCNC: 0.3 MG/DL (ref 0–1.2)
BILIRUB UR QL STRIP: NEGATIVE
BUN SERPL-MCNC: 22 MG/DL (ref 8–23)
CALCIUM SERPL-MCNC: 9.7 MG/DL (ref 8.6–10.4)
CASTS #/AREA URNS LPF: ABNORMAL /LPF
CHLORIDE SERPL-SCNC: 96 MMOL/L (ref 98–107)
CLARITY UR: CLEAR
CO2 SERPL-SCNC: 21 MMOL/L (ref 20–31)
COLOR UR: YELLOW
CREAT SERPL-MCNC: 1.2 MG/DL (ref 0.7–1.2)
EOSINOPHIL # BLD: 0.21 K/UL (ref 0–0.44)
EOSINOPHILS RELATIVE PERCENT: 3 % (ref 0–4)
EPI CELLS #/AREA URNS HPF: ABNORMAL /HPF
ERYTHROCYTE [DISTWIDTH] IN BLOOD BY AUTOMATED COUNT: 13 % (ref 11.5–14.9)
GFR, ESTIMATED: 47 ML/MIN/1.73M2
GLUCOSE SERPL-MCNC: 393 MG/DL (ref 74–99)
GLUCOSE UR STRIP-MCNC: ABNORMAL MG/DL
HCT VFR BLD AUTO: 47 % (ref 36–46)
HGB BLD-MCNC: 15.6 G/DL (ref 12–16)
HGB UR QL STRIP.AUTO: NEGATIVE
IMM GRANULOCYTES # BLD AUTO: 0.03 K/UL (ref 0–0.3)
IMM GRANULOCYTES NFR BLD: 0 %
KETONES UR STRIP-MCNC: ABNORMAL MG/DL
LEUKOCYTE ESTERASE UR QL STRIP: NEGATIVE
LIPASE SERPL-CCNC: 55 U/L (ref 13–60)
LYMPHOCYTES NFR BLD: 1.71 K/UL (ref 1.1–3.7)
LYMPHOCYTES RELATIVE PERCENT: 23 % (ref 24–44)
MAGNESIUM SERPL-MCNC: 1.7 MG/DL (ref 1.6–2.4)
MCH RBC QN AUTO: 29.7 PG (ref 26–34)
MCHC RBC AUTO-ENTMCNC: 33.2 G/DL (ref 31–37)
MCV RBC AUTO: 89.4 FL (ref 80–100)
MONOCYTES NFR BLD: 0.73 K/UL (ref 0.1–1.2)
MONOCYTES NFR BLD: 10 % (ref 3–12)
NEUTROPHILS NFR BLD: 63 % (ref 36–66)
NEUTS SEG NFR BLD: 4.82 K/UL (ref 1.5–8.1)
NITRITE UR QL STRIP: NEGATIVE
NRBC BLD-RTO: 0 PER 100 WBC
PH UR STRIP: 5 [PH] (ref 5–8)
PLATELET # BLD AUTO: 184 K/UL (ref 150–450)
PMV BLD AUTO: 11.3 FL (ref 8–13.5)
POTASSIUM SERPL-SCNC: 4 MMOL/L (ref 3.7–5.3)
PROT SERPL-MCNC: 7.6 G/DL (ref 6.6–8.7)
PROT UR STRIP-MCNC: NEGATIVE MG/DL
RBC # BLD AUTO: 5.26 M/UL (ref 3.95–5.11)
RBC #/AREA URNS HPF: ABNORMAL /HPF
SODIUM SERPL-SCNC: 134 MMOL/L (ref 136–145)
SP GR UR STRIP: 1.03 (ref 1–1.03)
TROPONIN I SERPL HS-MCNC: 15 NG/L (ref 0–14)
TROPONIN I SERPL HS-MCNC: 16 NG/L (ref 0–14)
UROBILINOGEN UR STRIP-ACNC: NORMAL EU/DL (ref 0–1)
WBC #/AREA URNS HPF: ABNORMAL /HPF
WBC OTHER # BLD: 7.6 K/UL (ref 3.5–11)

## 2025-08-31 PROCEDURE — 83690 ASSAY OF LIPASE: CPT

## 2025-08-31 PROCEDURE — 85025 COMPLETE CBC W/AUTO DIFF WBC: CPT

## 2025-08-31 PROCEDURE — 84484 ASSAY OF TROPONIN QUANT: CPT

## 2025-08-31 PROCEDURE — 80053 COMPREHEN METABOLIC PANEL: CPT

## 2025-08-31 PROCEDURE — 83735 ASSAY OF MAGNESIUM: CPT

## 2025-08-31 PROCEDURE — 81001 URINALYSIS AUTO W/SCOPE: CPT

## 2025-08-31 PROCEDURE — 36415 COLL VENOUS BLD VENIPUNCTURE: CPT

## 2025-08-31 PROCEDURE — 99285 EMERGENCY DEPT VISIT HI MDM: CPT

## 2025-08-31 PROCEDURE — 93005 ELECTROCARDIOGRAM TRACING: CPT

## 2025-08-31 PROCEDURE — 71046 X-RAY EXAM CHEST 2 VIEWS: CPT

## 2025-08-31 PROCEDURE — 87086 URINE CULTURE/COLONY COUNT: CPT

## 2025-08-31 PROCEDURE — 74176 CT ABD & PELVIS W/O CONTRAST: CPT

## 2025-08-31 ASSESSMENT — PAIN SCALES - GENERAL
PAINLEVEL_OUTOF10: 6
PAINLEVEL_OUTOF10: 1

## 2025-08-31 ASSESSMENT — PAIN - FUNCTIONAL ASSESSMENT: PAIN_FUNCTIONAL_ASSESSMENT: 0-10

## 2025-08-31 ASSESSMENT — PAIN DESCRIPTION - LOCATION: LOCATION: BACK

## 2025-08-31 ASSESSMENT — PAIN DESCRIPTION - DESCRIPTORS: DESCRIPTORS: ACHING

## 2025-09-01 LAB
MICROORGANISM SPEC CULT: NORMAL
SPECIMEN DESCRIPTION: NORMAL

## 2025-09-02 LAB
EKG ATRIAL RATE: 89 BPM
EKG P AXIS: 62 DEGREES
EKG P-R INTERVAL: 148 MS
EKG Q-T INTERVAL: 316 MS
EKG QRS DURATION: 70 MS
EKG QTC CALCULATION (BAZETT): 384 MS
EKG R AXIS: -53 DEGREES
EKG T AXIS: 94 DEGREES
EKG VENTRICULAR RATE: 89 BPM

## 2025-09-02 PROCEDURE — 93010 ELECTROCARDIOGRAM REPORT: CPT | Performed by: INTERNAL MEDICINE

## (undated) DEVICE — CATH LITHOPLSTY 2.5X12MM SHOCKWAVE

## (undated) DEVICE — DEFENDO AIR WATER SUCTION AND BIOPSY VALVE KIT FOR  OLYMPUS: Brand: DEFENDO AIR/WATER/SUCTION AND BIOPSY VALVE

## (undated) DEVICE — GUIDEWIRE WITH ICE™ HYDROPHILIC COATING: Brand: LUGE™

## (undated) DEVICE — ANGIOGRAPHIC CATHETER: Brand: EXPO™

## (undated) DEVICE — POLYP TRAP: Brand: TRAPEASE®

## (undated) DEVICE — CATH BLLN ANGIO 2.50X12MM SC EUPHORA RX

## (undated) DEVICE — Device: Brand: PROWATER

## (undated) DEVICE — GUIDEWIRE 35/260/FC/PTFE/3J: Brand: GUIDEWIRE

## (undated) DEVICE — RUNTHROUGH NS EXTRA FLOPPY PTCA GUIDEWIRE: Brand: RUNTHROUGH

## (undated) DEVICE — CATHETER GUID EXTRA BACKUP 3.5 0.070IN 6FR 100CM VISTA BRITE TIP

## (undated) DEVICE — CATH BLLN ANGIO 3.25X15MM SC EUPHORA RX

## (undated) DEVICE — FORCEPS BX L240CM JAW DIA2.8MM L CAP W/ NDL MIC MESH TOOTH

## (undated) DEVICE — AIRLIFE™ NASAL OXYGEN CANNULA CURVED, FLARED TIP, WITH 7 FEET (2.1 M) CRUSH RESISTANT TUBING, OVER-THE-EAR STYLE: Brand: AIRLIFE™

## (undated) DEVICE — CATHETER GUID ADRT 6FR 072IN 5 ECPC GRN

## (undated) DEVICE — CATH BLLN ANGIO 2.50X20MM SC EUPHORA RX

## (undated) DEVICE — DEVICE COMPR LNG 27 CM VASC BND

## (undated) DEVICE — CATH BLLN ANGIO 3.25X15MM NC EUPHORIA RX

## (undated) DEVICE — GLIDESHEATH SLENDER STAINLESS STEEL KIT: Brand: GLIDESHEATH SLENDER

## (undated) DEVICE — Device: Brand: MINAMO

## (undated) DEVICE — Device: Brand: ASAHI SION BLUE

## (undated) DEVICE — INFLATION KIT CUST REV

## (undated) DEVICE — SURGICAL PROCEDURE TRAY CRD CATH SVMMC

## (undated) DEVICE — CATHETER GUID 5FR GWIRE 0.058IN COR EXTRA BKUP SUPP 3.5 ACT

## (undated) DEVICE — SNARE ENDOSCP L240CM LOOP W13MM DIA2.4MM SHT THROW SM OVL

## (undated) DEVICE — INFLATION KIT CUST J REV

## (undated) DEVICE — CATH BLLN ANGIO 3X15MM NC EUPHORIA RX

## (undated) DEVICE — CATHETER GUID 6FR L100CM GRN PTFE JR4 TRUELUMEN W/ 2 SIDE H

## (undated) DEVICE — BITEBLOCK 54FR W/ DENT RIM BLOX

## (undated) DEVICE — TRAY SURG CUST CRD CATH TOLEDO

## (undated) DEVICE — VALVE HEMSTAS W/ GWIRE INSRTN TOOL GRDIAN II NC

## (undated) DEVICE — CATHETER ANGIO 6FR L100CM DIA0.056IN FR4 CRV VASC ACCS EXPO

## (undated) DEVICE — KIT MICRO INTRO 4FR STIFF 40CM NIGHTENALL TUNGSTEN 7SMT

## (undated) DEVICE — Device

## (undated) DEVICE — BAND COMPR L24CM REG CLR PLAS HEMSTAT EXT HK AND LOOP RETEN

## (undated) DEVICE — INTRODUCER SHTH 6FR L11CM 0.038IN STD SIDEPRT EXTN 3 W

## (undated) DEVICE — CATH BLLN ANGIO 3.50X15MM SC EUPHORA RX

## (undated) DEVICE — ANGIO-SEAL VIP VASCULAR CLOSURE DEVICE: Brand: ANGIO-SEAL

## (undated) DEVICE — ENDO KIT W/SYRINGE: Brand: MEDLINE INDUSTRIES, INC.

## (undated) DEVICE — INTRODUCER SHTH 0.018 IN 4 FRX40 CM KT SFT TIP NIT VSI 7266V

## (undated) DEVICE — Device: Brand: EAGLE EYE PLATINUM RX DIGITAL IVUS CATHETER

## (undated) DEVICE — GLOVE ORANGE PI 8   MSG9080